# Patient Record
Sex: MALE | Race: WHITE | NOT HISPANIC OR LATINO | Employment: OTHER | URBAN - METROPOLITAN AREA
[De-identification: names, ages, dates, MRNs, and addresses within clinical notes are randomized per-mention and may not be internally consistent; named-entity substitution may affect disease eponyms.]

---

## 2017-01-13 ENCOUNTER — ANTICOAGULATION THERAPY VISIT (OUTPATIENT)
Dept: NURSING | Facility: CLINIC | Age: 77
End: 2017-01-13
Payer: MEDICARE

## 2017-01-13 LAB — INR POINT OF CARE: 3.4 (ref 2.5–3.5)

## 2017-01-13 PROCEDURE — 99207 ZZC NO CHARGE NURSE ONLY: CPT

## 2017-01-13 PROCEDURE — 85610 PROTHROMBIN TIME: CPT | Mod: QW

## 2017-01-13 PROCEDURE — 36416 COLLJ CAPILLARY BLOOD SPEC: CPT

## 2017-01-13 NOTE — PROGRESS NOTES
ANTICOAGULATION FOLLOW-UP CLINIC VISIT    Patient Name:  Fausto Farr  Date:  1/13/2017  Contact Type:  Face to Face    SUBJECTIVE:     Patient Findings     Positives Other Complaints (Feeks weak,b/p 102/70,pulse 64)    Comments Son is in a coma            OBJECTIVE    INR PROTIME   Date Value Ref Range Status   01/13/2017 3.4 2.5 - 3.5 Final       ASSESSMENT / PLAN  INR assessment THER    Recheck INR In: 4 WEEKS    INR Location Clinic      Anticoagulation Summary as of 1/13/2017     INR goal 2.5-3.5   Selected INR 3.4 (1/13/2017)   Maintenance plan 5 mg (5 mg x 1) on Mon, Fri; 7.5 mg (5 mg x 1.5) all other days   Full instructions 5 mg on Mon, Fri; 7.5 mg all other days   Weekly total 47.5 mg   No change documented Gayatri Parmar RN   Plan last modified Gayatri Parmar RN (12/23/2016)   Next INR check 2/10/2017   Priority INR   Target end date Indefinite    Indications   AF (atrial fibrillation) (H) [I48.91]  S/P mitral valve replacement [Z95.2]  Long-term (current) use of anticoagulants [Z79.01] [Z79.01]         Anticoagulation Episode Summary     [facility]R check location [facility]    Preferred lab     Send INR reminders to [facility] INR/PROTIME    Comments       Anticoagulation Care Providers     Provider Role Specialty Phone number    Nii Nelson MD [facility] [phone]            See the Encounter Report to view Anticoagulation Flowsheet and Dosing Calendar (Go to Encounters tab in chart review, and find the Anticoagulation Therapy Visit)        Gayatri Parmar RN

## 2017-01-13 NOTE — MR AVS SNAPSHOT
Fausto Carson Yordan   1/13/2017 9:45 AM   Anticoagulation Therapy Visit    Description:  76 year old male   Provider:  [facility]   Department:  Bx Nurse           INR as of 1/13/2017     Selected INR 3.4 (1/13/2017)      Anticoagulation Summary as of 1/13/2017     INR goal 2.5-3.5   Selected INR 3.4 (1/13/2017)   Full instructions 5 mg on Mon, Fri; 7.5 mg all other days   Next INR check 2/10/2017    Indications   AF (atrial fibrillation) (H) [I48.91]  S/P mitral valve replacement [Z95.2]  Long-term (current) use of anticoagulants [Z79.01] [Z79.01]         Your next Anticoagulation Clinic appointment(s)     Feb 10, 2017  9:45 AM   Anticoagulation Visit with  ANTICOAGULATION CLINIC   [facility] ([facility])    [address] [place] [id number] [phone]              Contact Numbers     [facility]  Please call  [phone] to cancel and/or reschedule your appointment   The direct line to the anticoagulant nurse is [phone] on Monday, Wednesday, and Friday. On Thursday, the anticoagulant nurse can be reached directly at [phone].         January 2017 Details    Sun Mon Tue Wed Thu Fri Sat     1               2               3               4               5               6               7                 8               9               10               11               12               13      5 mg   See details      14      7.5 mg           15      7.5 mg         16      5 mg         17      7.5 mg         18      7.5 mg         19      7.5 mg         20      5 mg         21      7.5 mg           22      7.5 mg         23      5 mg         24      7.5 mg         25      7.5 mg         26      7.5 mg         27      5 mg         28      7.5 mg           29      7.5 mg         30      5 mg         31      7.5 mg              Date Details   01/13 This INR check               How to take your  warfarin dose     To take:  5 mg Take 1 of the 5 mg tablets.    To take:  7.5 mg Take 1.5 of the 5 mg tablets.           February 2017 Details    Sun Mon Tue Wed Thu Fri Sat        1      7.5 mg         2      7.5 mg         3      5 mg         4      7.5 mg           5      7.5 mg         6      5 mg         7      7.5 mg         8      7.5 mg         9      7.5 mg         10            11                 12               13               14               15               16               17               18                 19               20               21               22               23               24               25                 26               27               28                    Date Details   No additional details    Date of next INR:  2/10/2017         How to take your warfarin dose     To take:  5 mg Take 1 of the 5 mg tablets.    To take:  7.5 mg Take 1.5 of the 5 mg tablets.

## 2017-01-17 DIAGNOSIS — E78.2 MIXED HYPERLIPIDEMIA: Primary | ICD-10-CM

## 2017-01-17 NOTE — TELEPHONE ENCOUNTER
ZETIA 10MG      Last Written Prescription Date: 7/20/16  Last Fill Quantity: 90, # refills: 1    [place] [facility] [place] [facility] prescribing provider:  9/6/16   Future Office Visit:      CHOLESTEROL   Date Value Ref Range Status   07/18/2016 186 <200 mg/dL Final     HDL CHOLESTEROL   Date Value Ref Range Status   07/18/2016 33* >39 mg/dL Final     LDL CHOLESTEROL CALCULATED   Date Value Ref Range Status   07/18/2016 103* <100 mg/dL Final     Comment:     Above desirable:  100-129 mg/dl   Borderline High:  130-159 mg/dL   High:             160-189 mg/dL   Very high:       >189 mg/dl       LDL CHOLESTEROL DIRECT   Date Value Ref Range Status   03/21/2016 113* <100 mg/dL Final     Comment:     Above desirable:  100-129 mg/dl   Borderline High:  130-159 mg/dL   High:             160-189 mg/dL   Very high:       >189 mg/dl       TRIGLYCERIDES   Date Value Ref Range Status   07/18/2016 248* <150 mg/dL Final     Comment:     Fasting specimen   Borderline high:  150-199 mg/dl   High:             200-499 mg/dl   Very high:       >499 mg/dl       CHOLESTEROL/HDL RATIO   Date Value Ref Range Status   06/03/2015 3.6 0.0 - 5.0 Final     ALT   Date Value Ref Range Status   07/18/2016 35 0 - 70 U/L Final

## 2017-01-18 RX ORDER — EZETIMIBE 10 MG
TABLET ORAL
Qty: 90 TABLET | Refills: 2 | Status: SHIPPED | OUTPATIENT
Start: 2017-01-18 | End: 2017-10-29

## 2017-01-18 NOTE — TELEPHONE ENCOUNTER
Prescription approved per [facility] Refill Protocol.  Coty Hair RN- Triage [employer]

## 2017-01-19 ENCOUNTER — TRANSFERRED RECORDS (OUTPATIENT)
Dept: HEALTH INFORMATION MANAGEMENT | Facility: CLINIC | Age: 77
End: 2017-01-19

## 2017-02-10 ENCOUNTER — ANTICOAGULATION THERAPY VISIT (OUTPATIENT)
Dept: NURSING | Facility: CLINIC | Age: 77
End: 2017-02-10
Payer: MEDICARE

## 2017-02-10 DIAGNOSIS — I48.91 AF (ATRIAL FIBRILLATION) (H): ICD-10-CM

## 2017-02-10 DIAGNOSIS — Z79.01 LONG-TERM (CURRENT) USE OF ANTICOAGULANTS: Primary | ICD-10-CM

## 2017-02-10 DIAGNOSIS — Z95.2 S/P MITRAL VALVE REPLACEMENT: ICD-10-CM

## 2017-02-10 LAB — INR POINT OF CARE: 2.5 (ref 0.86–1.14)

## 2017-02-10 PROCEDURE — 99207 ZZC NO CHARGE NURSE ONLY: CPT

## 2017-02-10 PROCEDURE — 36416 COLLJ CAPILLARY BLOOD SPEC: CPT

## 2017-02-10 PROCEDURE — 85610 PROTHROMBIN TIME: CPT | Mod: QW

## 2017-02-10 NOTE — PROGRESS NOTES
ANTICOAGULATION FOLLOW-UP CLINIC VISIT    Patient Name:  Fausto Farr  Date:  2/10/2017  Contact Type:  Face to Face    SUBJECTIVE:     Patient Findings     Positives No Problem Findings           OBJECTIVE    INR PROTIME   Date Value Ref Range Status   02/10/2017 2.5* 0.86 - 1.14 Final       ASSESSMENT / PLAN  INR assessment THER    Recheck INR In: 4 WEEKS    INR Location Clinic      Anticoagulation Summary as of 2/10/2017     INR goal 2.5-3.5   Selected INR 2.5 (2/10/2017)   Maintenance plan 5 mg (5 mg x 1) on Mon, Fri; 7.5 mg (5 mg x 1.5) all other days   Full instructions 5 mg on Mon, Fri; 7.5 mg all other days   Weekly total 47.5 mg   Plan last modified Gayatri Parmar RN (12/23/2016)   Next INR check 3/10/2017   Priority INR   Target end date Indefinite    Indications   AF (atrial fibrillation) (H) [I48.91]  S/P mitral valve replacement [Z95.2]  Long-term (current) use of anticoagulants [Z79.01] [Z79.01]         Anticoagulation Episode Summary     INR check location [facility]    Preferred lab     Send INR reminders to [facility] INR/PROTIME    Comments       Anticoagulation Care Providers     Provider Role Specialty Phone number    Nii Nelson MD [facility] [phone]            See the Encounter Report to view Anticoagulation Flowsheet and Dosing Calendar (Go to Encounters tab in chart review, and find the Anticoagulation Therapy Visit)        Latanya Ram RN

## 2017-02-10 NOTE — MR AVS SNAPSHOT
Fausto Danielsdashawn Farr   2/10/2017 9:45 AM   Anticoagulation Therapy Visit    Description:  76 year old male   Provider:  [facility]   Department:  Bx Nurse           INR as of 2/10/2017     Selected INR 2.5 (2/10/2017)      Anticoagulation Summary as of 2/10/2017     INR goal 2.5-3.5   Selected INR 2.5 (2/10/2017)   Full instructions 5 mg on Mon, Fri; 7.5 mg all other days   Next INR check 3/10/2017    Indications   AF (atrial fibrillation) (H) [I48.91]  S/P mitral valve replacement [Z95.2]  Long-term (current) use of anticoagulants [Z79.01] [Z79.01]         Your next Anticoagulation Clinic appointment(s)     Mar 10, 2017  9:45 AM   Anticoagulation Visit with  ANTICOAGULATION CLINIC   [facility] ([facility])    [address] [place] [id number] [phone]              Contact Numbers     [facility]  Please call  [phone] to cancel and/or reschedule your appointment   The direct line to the anticoagulant nurse is [phone] on Monday, Wednesday, and Friday. On Thursday, the anticoagulant nurse can be reached directly at [phone].         February 2017 Details    Sun Mon Tue Wed Thu Fri Sat        1               2               3               4                 5               6               7               8               9               10      5 mg   See details      11      7.5 mg           12      7.5 mg         13      5 mg         14      7.5 mg         15      7.5 mg         16      7.5 mg         17      5 mg         18      7.5 mg           19      7.5 mg         20      5 mg         21      7.5 mg         22      7.5 mg         23      7.5 mg         24      5 mg         25      7.5 mg           26      7.5 mg         27      5 mg         28      7.5 mg              Date Details   02/10 This INR check               How to take your warfarin dose     To take:  5 mg Take 1 of the  5 mg tablets.    To take:  7.5 mg Take 1.5 of the 5 mg tablets.           March 2017 Details    Sun Mon Tue Wed Thu Fri Sat        1      7.5 mg         2      7.5 mg         3      5 mg         4      7.5 mg           5      7.5 mg         6      5 mg         7      7.5 mg         8      7.5 mg         9      7.5 mg         10            11                 12               13               14               15               16               17               18                 19               20               21               22               23               24               25                 26               27               28               29               30               31                 Date Details   No additional details    Date of next INR:  3/10/2017         How to take your warfarin dose     To take:  5 mg Take 1 of the 5 mg tablets.    To take:  7.5 mg Take 1.5 of the 5 mg tablets.

## 2017-02-21 DIAGNOSIS — Z79.01 LONG-TERM (CURRENT) USE OF ANTICOAGULANTS: ICD-10-CM

## 2017-02-21 DIAGNOSIS — Z95.2 S/P AORTIC VALVE REPLACEMENT: ICD-10-CM

## 2017-02-21 RX ORDER — WARFARIN SODIUM 5 MG/1
TABLET ORAL
Qty: 135 TABLET | Refills: 0 | Status: SHIPPED | OUTPATIENT
Start: 2017-02-21 | End: 2017-07-13

## 2017-02-21 NOTE — TELEPHONE ENCOUNTER
Warfarin    Last Written Prescription Date: 5/5/2016  Last Fill Qty: 135, # refills: 2  Last Office Visit with [facility] or [facility] prescribing provider: 9/6/2016       Date and Result of Last PT/INR:   Lab Results   Component Value Date    INR 2.5 02/10/2017    INR 3.4 01/13/2017    INR 2.36 11/07/2015    INR 2.05 11/06/2015    PT 11.6 09/25/2012    PT 24.8 09/17/2012        Prescription approved per [facility] or [facility] refill protocol.  Cecy Crenshaw RN  Triage [employer]

## 2017-03-10 ENCOUNTER — TELEPHONE (OUTPATIENT)
Dept: NURSING | Facility: CLINIC | Age: 77
End: 2017-03-10

## 2017-03-10 ENCOUNTER — ANTICOAGULATION THERAPY VISIT (OUTPATIENT)
Dept: NURSING | Facility: CLINIC | Age: 77
End: 2017-03-10
Payer: MEDICARE

## 2017-03-10 DIAGNOSIS — I48.91 AF (ATRIAL FIBRILLATION) (H): ICD-10-CM

## 2017-03-10 DIAGNOSIS — Z79.01 LONG-TERM (CURRENT) USE OF ANTICOAGULANTS: ICD-10-CM

## 2017-03-10 DIAGNOSIS — Z95.2 S/P MITRAL VALVE REPLACEMENT: ICD-10-CM

## 2017-03-10 LAB — INR POINT OF CARE: 2.6 (ref 2.5–3.5)

## 2017-03-10 PROCEDURE — 85610 PROTHROMBIN TIME: CPT | Mod: QW

## 2017-03-10 PROCEDURE — 36416 COLLJ CAPILLARY BLOOD SPEC: CPT

## 2017-03-10 PROCEDURE — 99207 ZZC NO CHARGE NURSE ONLY: CPT

## 2017-03-10 NOTE — MR AVS SNAPSHOT
Fausto Carson Yordan   3/10/2017 9:45 AM   Anticoagulation Therapy Visit    Description:  76 year old male   Provider:  [facility]   Department:  Bx Nurse           INR as of 3/10/2017     Today's INR 2.6      Anticoagulation Summary as of 3/10/2017     INR goal 2.5-3.5   Today's INR 2.6   Full instructions 5 mg on Mon; 7.5 mg all other days   Next INR check 4/14/2017    Indications   AF (atrial fibrillation) (H) [I48.91]  S/P mitral valve replacement [Z95.2]  Long-term (current) use of anticoagulants [Z79.01] [Z79.01]         Your next Anticoagulation Clinic appointment(s)     Apr 14, 2017  9:15 AM CDT   Anticoagulation Visit with  ANTICOAGULATION CLINIC   [facility] ([facility])    [address] [place] [address] [id number] [phone]              Contact Numbers     [facility]  Please call  [phone] to cancel and/or reschedule your appointment   The direct line to the anticoagulant nurse is [phone] on Monday, Wednesday, and Friday. On Thursday, the anticoagulant nurse can be reached directly at [phone].         March 2017 Details    Sun Mon Tue Wed Thu Fri Sat        1               2               3               4                 5               6               7               8               9               10      7.5 mg   See details      11      7.5 mg           12      7.5 mg         13      5 mg         14      7.5 mg         15      7.5 mg         16      7.5 mg         17      7.5 mg         18      7.5 mg           19      7.5 mg         20      5 mg         21      7.5 mg         22      7.5 mg         23      7.5 mg         24      7.5 mg         25      7.5 mg           26      7.5 mg         27      5 mg         28      7.5 mg         29      7.5 mg         30      7.5 mg         31      7.5 mg           Date Details   03/10 This INR check               How to take your  warfarin dose     To take:  5 mg Take 1 of the 5 mg tablets.    To take:  7.5 mg Take 1.5 of the 5 mg tablets.           April 2017 Details    Sun Mon Tue Wed Thu Fri Sat           1      7.5 mg           2      7.5 mg         3      5 mg         4      7.5 mg         5      7.5 mg         6      7.5 mg         7      7.5 mg         8      7.5 mg           9      7.5 mg         10      5 mg         11      7.5 mg         12      7.5 mg         13      7.5 mg         14            15                 16               17               18               19               20               21               22                 23               24               25               26               27               28               29                 30                      Date Details   No additional details    Date of next INR:  4/14/2017         How to take your warfarin dose     To take:  5 mg Take 1 of the 5 mg tablets.    To take:  7.5 mg Take 1.5 of the 5 mg tablets.

## 2017-03-10 NOTE — TELEPHONE ENCOUNTER
Patient was in for his INR and requested to have his b/p checked it was 118/68.Pt states that he was taking 1.5 tabs of metoprolol 2x/day but 2 weeks ago he was feeling very tired and his b/p was very low (didn't give a reading) so he dropped the half and went back to 1 tab 2x day and that is where he is going to stay wants the Dr to know and to change it in his chart. Will forward to provider.

## 2017-03-10 NOTE — PROGRESS NOTES
ANTICOAGULATION FOLLOW-UP CLINIC VISIT    Patient Name:  Fausto Farr  Date:  3/10/2017  Contact Type:  Face to Face    SUBJECTIVE:     Patient Findings     Positives No Problem Findings    Comments B/P 118/68            OBJECTIVE    INR Protime   Date Value Ref Range Status   03/10/2017 2.6 2.5 - 3.5 Final       ASSESSMENT / PLAN  INR assessment THER    Recheck INR In: 5 WEEKS    INR Location Clinic      Anticoagulation Summary as of 3/10/2017     INR goal 2.5-3.5   Today's INR 2.6   Maintenance plan 5 mg (5 mg x 1) on Mon; 7.5 mg (5 mg x 1.5) all other days   Full instructions 5 mg on Mon; 7.5 mg all other days   Weekly total 50 mg   Plan last modified Gayatri Parmar RN (3/10/2017)   Next INR check 4/14/2017   Priority INR   Target end date Indefinite    Indications   AF (atrial fibrillation) (H) [I48.91]  S/P mitral valve replacement [Z95.2]  Long-term (current) use of anticoagulants [Z79.01] [Z79.01]         Anticoagulation Episode Summary     INR check location [facility]    Preferred lab     Send INR reminders to [facility] INR/PROTIME    Comments       Anticoagulation Care Providers     Provider Role Specialty Phone number    Nii Nelson MD [facility] [phone]            See the Encounter Report to view Anticoagulation Flowsheet and Dosing Calendar (Go to Encounters tab in chart review, and find the Anticoagulation Therapy Visit)        Gayatri Parmar RN

## 2017-03-10 NOTE — TELEPHONE ENCOUNTER
Yes BP staying well controlled so he should stay at the 1 tab twice daily.  Will change Rx when we need to refill the next time

## 2017-04-14 ENCOUNTER — ANTICOAGULATION THERAPY VISIT (OUTPATIENT)
Dept: NURSING | Facility: CLINIC | Age: 77
End: 2017-04-14
Payer: MEDICARE

## 2017-04-14 ENCOUNTER — OFFICE VISIT (OUTPATIENT)
Dept: FAMILY MEDICINE | Facility: CLINIC | Age: 77
End: 2017-04-14
Payer: MEDICARE

## 2017-04-14 VITALS
SYSTOLIC BLOOD PRESSURE: 112 MMHG | WEIGHT: 225 LBS | TEMPERATURE: 97.9 F | DIASTOLIC BLOOD PRESSURE: 60 MMHG | HEIGHT: 65 IN | OXYGEN SATURATION: 96 % | BODY MASS INDEX: 37.49 KG/M2 | RESPIRATION RATE: 18 BRPM | HEART RATE: 80 BPM

## 2017-04-14 DIAGNOSIS — R22.9 LOCALIZED SUPERFICIAL SWELLING, MASS, OR LUMP: Primary | ICD-10-CM

## 2017-04-14 DIAGNOSIS — Z95.2 S/P MITRAL VALVE REPLACEMENT: ICD-10-CM

## 2017-04-14 DIAGNOSIS — Z79.01 LONG-TERM (CURRENT) USE OF ANTICOAGULANTS: ICD-10-CM

## 2017-04-14 DIAGNOSIS — I48.91 AF (ATRIAL FIBRILLATION) (H): ICD-10-CM

## 2017-04-14 LAB — INR POINT OF CARE: 5 (ref 0.86–1.14)

## 2017-04-14 PROCEDURE — 85610 PROTHROMBIN TIME: CPT | Mod: QW

## 2017-04-14 PROCEDURE — 99213 OFFICE O/P EST LOW 20 MIN: CPT | Performed by: PHYSICIAN ASSISTANT

## 2017-04-14 PROCEDURE — 99207 ZZC NO CHARGE NURSE ONLY: CPT

## 2017-04-14 PROCEDURE — 36416 COLLJ CAPILLARY BLOOD SPEC: CPT

## 2017-04-14 NOTE — MR AVS SNAPSHOT
"              After Visit Summary   4/14/2017    Fausto Farr    MRN: [id number]           Patient Information     Date Of Birth          1940        Visit Information        Provider Department      4/14/2017 9:30 AM Siegler, Nicole Joy, PA-C [facility]        Today's Diagnoses     Localized superficial swelling, mass, or lump    -  1       Follow-ups after your visit        Your next 10 appointments already scheduled     Apr 19, 2017  9:30 AM CDT   Anticoagulation Visit with [facility]   [facility] ([facility]    [address]  [facility] [id number] [phone]            Apr 25, 2017 11:40 AM CDT   PHYSICAL with Tu Reyes MD   [facility] ([facility]    [address]  [facility] [phone]              Who to contact     If you have questions or need follow up information about today's clinic visit or your schedule please contact [facility] directly at [phone].  Normal or non-critical lab and imaging results will be communicated to you by MyChart, letter or phone within 4 business days after the clinic has received the results. If you do not hear from us within 7 days, please contact the clinic through MyChart or phone. If you have a critical or abnormal lab result, we will notify you by phone as soon as possible.  Submit refill requests through [employer] or call your pharmacy and they will forward the refill request to us. Please allow 3 business days for your refill to be completed.          Additional Information About Your Visit        MyChart Information     [employer] lets you send messages to your doctor, view your test results, renew your prescriptions, schedule appointments and more. To sign up, go to [email or web address] . Click on \"Log in\" on the left " "side of the screen, which will take you to the Welcome page. Then click on \"Sign up Now\" on the right side of the page.     You will be asked to enter the access code listed below, as well as some personal information. Please follow the directions to create your username and password.     Your access code is: [email or web address]  Expires: 2017 10:08 AM     Your access code will  in 90 days. If you need help or a new code, please call your [facility] clinic or [phone].        Care EveryWhere ID     This is your Care EveryWhere ID. This could be used by other organizations to access your [facility] medical records  [id number]        Your Vitals Were     Pulse Temperature Respirations Height Pulse Oximetry BMI (Body Mass Index)    80 97.9  F (36.6  C) (Tympanic) 18 5' 5\" (1.651 m) 96% 37.44 kg/m2       Blood Pressure from Last 3 Encounters:   17 112/60   16 130/80   16 117/71    Weight from Last 3 Encounters:   17 225 lb (102.1 kg)   16 219 lb (99.3 kg)   16 217 lb (98.4 kg)              Today, you had the following     No orders found for display       Primary Care Provider Office Phone # Fax #    Nii Nelson [phone]       [facility] [address] [facility] [address]        Thank you!     Thank you for choosing [facility]  for your care. Our goal is always to provide you with excellent care. Hearing back from our patients is one way we can continue to improve our services. Please take a few minutes to complete the written survey that you may receive in the mail after your visit with us. Thank you!             Your Updated Medication List - Protect others around you: Learn how to safely use, store and throw away your medicines at www.disposemymeds.org.          This list is accurate as of: 17 10:08 AM.  Always use your most recent med list.                   Brand Name Dispense Instructions for use "    ASPIRIN EC PO      Take 81 mg by mouth every evening       betamethasone valerate 0.1 % lotion    VALISONE    60 mL    APPLY TOPICALLY TWICE DAILY PRN       calcium carb 1250 mg (500 mg [place])/vitamin D 200 units 500-200 MG-UNIT per tablet    OSCAL with D    90 tablet    Take 1 tablet by mouth 3 times daily (with meals)       CRESTOR 40 MG tablet   Generic drug:  rosuvastatin     90 tablet    TAKE 1 TABLET BY MOUTH DAILY.       erythromycin ophthalmic ointment    ROMYCIN     AURELIA A SMALL AMOUNT IN BOTH EYES ONCE IN THE EVENING FOR 30 DAYS       fluocinonide 0.05 % ointment    LIDEX    60 g    2- 30 gram tubes.  Apply twice a day as needed.       furosemide 40 MG tablet    LASIX    45 tablet    Take 0.5 tablets (20 mg) by mouth daily       lisinopril 2.5 MG tablet    PRINIVIL/Zestril    90 tablet    Take 1 tablet (2.5 mg) by mouth daily       mesalamine 800 MG EC tablet    ASACOL HD    180 tablet    TAKE 1 TABLET BY MOUTH TWICE DAILY       metoprolol 25 MG tablet    LOPRESSOR    270 tablet    Take 1.5 tablets (37.5 mg) by mouth 2 times daily       OMEGA-3 FISH OIL PO      Take 2 g by mouth 2 times daily (with meals)       tamsulosin 0.4 MG capsule    FLOMAX    90 capsule    TAKE 1 CAPSULE BY MOUTH EVERY DAY       warfarin 5 MG tablet    COUMADIN    135 tablet    TAKE 1 AND 1/2 TABLETS BY MOUTH DAILY OR AS DIRECTED       ZETIA 10 MG tablet   Generic drug:  ezetimibe     90 tablet    TAKE 1 TABLET BY MOUTH DAILY

## 2017-04-14 NOTE — PROGRESS NOTES
SUBJECTIVE:                                                    Fausto Farr is a 76 year old male who presents to clinic today for the following health issues:      Bump on arm      Duration: 5 days    Description (location/character/radiation): Non painful bump on right forearm. Started with redness which turned to bruising around area    Intensity:  mild    Accompanying signs and symptoms: No pain, No redness, No known injury    History (similar episodes/previous evaluation): None    Precipitating or alleviating factors: None    Therapies tried and outcome: None     Pt visits today for check of a lump on the right volar forearm that he noticed about 5 days ago. He has been doing a lot of yard work and manipulations with this hands, wrist and forearms. He has no pain in the area but noticed a subsiding area of bruising around it. No swelling, redness, numbness or tingling of the arm, hand or fingers.     He is currently on warfarin, his INR today was 5.0. He will follow instructions of INR nurse to lower this. He is scheduled for recheck in 5 days.     Problem list and histories reviewed & adjusted, as indicated.  Additional history: as documented    Patient Active Problem List   Diagnosis     Rotator cuff (capsule) sprain     Somatic dysfunction of pelvic region     Contact dermatitis and other eczema, due to unspecified cause     Ulcerative colitis (H)     Atrial fibrillation (H)     Other specified anemias     Gastric ulcer     Bilateral low back pain with left-sided sciatica     Nonallopathic lesion of lumbar region     Nonallopathic lesion of sacral region     Diaphragmatic hernia     Abdominal pain, epigastric     Malaise and fatigue     Nocturia     Nonallopathic lesion of cervical region     Nonallopathic lesion of thoracic region     Cellulitis and abscess     Malignant neoplasm of prostate (H)     Abdominal aortic aneurysm (H)     Nonallopathic lesion of rib cage     Vitamin D deficiency disease      Anemia relative at 12.5 in 8-13      Essential hypertension, benign     Hyperlipidemia LDL goal <100     Prostate cancer (H)     Glucose intolerance (impaired glucose tolerance)     Back pain-since 1980's     Sciatica of left side since 2000     Valvular heart disease     Heart murmur     MRSA (methicillin resistant Staphylococcus aureus)     Health Care Home     Urinary retention     Coronary artery disease     ACP (advance care planning)     AF (atrial fibrillation) (H)     S/P aortic valve replacement     S/P CABG (coronary artery bypass graft)     Stented coronary artery     Mixed hyperlipidemia     PVD (peripheral vascular disease) (H)     Abnormal ECG     Personal history of tobacco use, presenting hazards to health     Spinal stenosis of lumbar region without neurogenic claudication     S/P mitral valve replacement     RBBB with left anterior fascicular block     S/P lumbar spinal fusion     Long-term (current) use of anticoagulants [Z79.01]     Chronic systolic congestive heart failure (H)     Paroxysmal atrial fibrillation (H)     Past Surgical History:   Procedure Laterality Date     AORTIC VALVE REPLACEMENT  1/3/06    redo AVR SJM 21mm and SJM MVR 27mm in 2013SJM 21([id number]):AVR, SJM 27 :MVR-     ARTHROPLASTY KNEE      right knee     BACK SURGERY  Oct 2015    Fusion L4-5, laminectomy L2, L3     BYPASS GRAFT ARTERY CORONARY  10/2013    reimplantation of radial artery graft to RCA     C CABG, VEIN, TWO  1/3/06    Left radial to RCA, LIMA to LAD (RA to RCA reimplanted at time of 2013 surg)     CARDIAC CATHERIZATION  11/2005    Stent placed to RCA     CARDIAC CATHERIZATION  04/2013    Occluded RCA, patent LIMA to LAD and radial graft to PDA     CARPAL TUNNEL RELEASE RT/LT  1994     COLONOSCOPY  8-22-11     CYSTOSCOPY FLEXIBLE  10/16/2013    Procedure: CYSTOSCOPY FLEXIBLE;  FLEXIBLE CYSTOSCOPY / DILATION OF URETHRA / INSERTION OF LESLIE;  Surgeon: Cooper Wallace MD;  Location:  OR      ENDOVASCULAR REPAIR ANEURYSM ABDOMINAL AORTA  2006     ENDOVASCULAR REPAIR, INFRARENAL ABDOMINAL AORTIC ANEURYSM/DISSECTION; MODULAR BIFURCATED PROSTHESIS      AAA repair endovascular     ENT SURGERY       GENITOURINARY SURGERY  6/16/08    radioactive seeding     HEAD & NECK SURGERY  1997    vocal cord polypectomy     KNEE SURGERY  2001 Right knee arthroscopy     OPTICAL TRACKING SYSTEM FUSION SPINE POSTERIOR LUMBAR THREE+ LEVELS N/A 10/29/2015    Procedure: OPTICAL TRACKING SYSTEM FUSION SPINE POSTERIOR LUMBAR THREE+ LEVELS;  Surgeon: Walt Garcia MD;  Location: [facility] OR     PROSTATE SURGERY  06/16/2008 Radioactive seeding     PROSTATE SURGERY      radioactive seeding 6/16/08     REPAIR ANEURYSM ABDOMINAL AORTA  06/08     REPAIR VALVE MITRAL  10/16/2013    SJM 21([id number]):AVR, SJM 27 :MVRProcedure: REPAIR VALVE MITRAL;  REDO STERNOTOMY/REDO AORTIC VALVE REPLACEMENT/ MITRAL VALVE REPLACEMENT/REIMPLANTATION OF RIGHT CORONARY ARTERY BYPASS WITH RACHAEL ( ON PUMP);  Surgeon: Veit Singh MD;  Location: [facility] OR     REPLACE VALVE AORTIC  10/16/2013    Procedure: REPLACE VALVE AORTIC;;  Surgeon: Viet Singh MD;  Location: [facility] OR     SURGERY GENERAL IP CONSULT  05/2008 Excision aneurysm abdominal aorta     SURGERY GENERAL IP CONSULT  1997 Vocal cord polypectomy     VASCULAR SURGERY  1968, 1993     varicose vein stripping       Social History   Substance Use Topics     Smoking status: Former Smoker     Packs/day: 1.00     Years: 40.00     Quit date: 10/23/2002     Smokeless tobacco: Never Used     Alcohol use Yes      Comment: a couple beers per week     Family History   Problem Relation Age of Onset     Coronary Artery Disease Father      CABG     HEART DISEASE Father      Pacemaker     HEART DISEASE Brother          Current Outpatient Prescriptions   Medication Sig Dispense Refill     warfarin (COUMADIN) 5 MG tablet TAKE 1 AND 1/2 TABLETS BY MOUTH DAILY OR AS DIRECTED 135 tablet 0     mesalamine  "(ASACOL HD) 800 MG EC tablet TAKE 1 TABLET BY MOUTH TWICE DAILY 180 tablet 1     ZETIA 10 MG tablet TAKE 1 TABLET BY MOUTH DAILY 90 tablet 2     furosemide (LASIX) 40 MG tablet Take 0.5 tablets (20 mg) by mouth daily 45 tablet 3     tamsulosin (FLOMAX) 0.4 MG 24 hr capsule TAKE 1 CAPSULE BY MOUTH EVERY DAY 90 capsule 3     lisinopril (PRINIVIL,ZESTRIL) 2.5 MG tablet Take 1 tablet (2.5 mg) by mouth daily 90 tablet 3     fluocinonide (LIDEX) 0.05 % ointment 2- 30 gram tubes.  Apply twice a day as needed. 60 g 2     betamethasone valerate (VALISONE) 0.1 % lotion APPLY TOPICALLY TWICE DAILY PRN 60 mL 3     erythromycin (ROMYCIN) ophthalmic ointment AURELIA A SMALL AMOUNT IN BOTH EYES ONCE IN THE EVENING FOR 30 DAYS  2     CRESTOR 40 MG tablet TAKE 1 TABLET BY MOUTH DAILY. 90 tablet 3     metoprolol (LOPRESSOR) 25 MG tablet Take 1.5 tablets (37.5 mg) by mouth 2 times daily 270 tablet 3     calcium carb 1250 mg, 500 mg Cachil DeHe,/vitamin D 200 units (OSCAL WITH D) 500-200 MG-UNIT per tablet Take 1 tablet by mouth 3 times daily (with meals) 90 tablet 3     ASPIRIN EC PO Take 81 mg by mouth every evening       Omega-3 Fatty Acids (OMEGA-3 FISH OIL PO) Take 2 g by mouth 2 times daily (with meals)          ROS:  Patient denies fever, chills, nausea, vomiting, diarrhea, abdominal pain, chest pain, shortness of breath, headache, dizziness, lightheadedness.    OBJECTIVE:                                                    /60 (BP Location: Right arm, Patient Position: Right side, Cuff Size: Adult Large)  Pulse 80  Temp 97.9  F (36.6  C) (Tympanic)  Resp 18  Ht 5' 5\" (1.651 m)  Wt 225 lb (102.1 kg)  SpO2 96%  BMI 37.44 kg/m2  Body mass index is 37.44 kg/(m^2).  GENERAL: healthy, alert and no distress  RESP: non labored breathing  MS: RUE exam shows normal strength and muscle mass, no deformities, no evidence of joint effusion, ROM of all joints is normal and no evidence of joint instability. No swelling compared to the left " upper extremity.   SKIN: medial volar aspect of forearm with small approx 5mm subcutaneous nodule that is not discolored, not tender to touch, not mobile. Surrounding tissue with healing ecchymosis and no erythema, induration or tenderness  NEURO: SILT r/u/m nerve distributions RUE  VASC: digits warm and well perfused, cap refill <2 secs in RUE    Diagnostic Test Results:  none      ASSESSMENT/PLAN:                                                        ICD-10-CM    1. Localized superficial swelling, mass, or lump R22.9    2. Long-term (current) use of anticoagulants [Z79.01] Z79.01      Possible lipoma that is calcified now and more notable to the patient? Uncommon placement for synovial cyst though it does appear to follow the flexor musculature. No concern at this time for blood clot or dangerous etiology.     He will continue to follow INR nurse recommendations and return next week.     He will use ace bandage, ice or heat the area and monitor symptoms. Return if swelling, redness, pain or numbness/tingling ensue. He agrees with the plan.     Nicole Joy Siegler, PA-C  [facility]

## 2017-04-14 NOTE — PROGRESS NOTES
ANTICOAGULATION FOLLOW-UP CLINIC VISIT    Patient Name:  Fausto Farr  Date:  4/14/2017  Contact Type:  Face to Face    SUBJECTIVE:     Patient Findings     Positives Other complaints (hip and low back pain)           OBJECTIVE    INR Protime   Date Value Ref Range Status   04/14/2017 5.0 (A) 0.86 - 1.14 Final       ASSESSMENT / PLAN  INR assessment SUPRA    Recheck INR In: 5 DAYS    INR Location Clinic      Anticoagulation Summary as of 4/14/2017     INR goal 2.5-3.5   Today's INR 5.0!   Maintenance plan 5 mg (5 mg x 1) on Mon; 7.5 mg (5 mg x 1.5) all other days   Full instructions 4/14: Hold; 4/15: Hold; Otherwise 5 mg on Mon; 7.5 mg all other days   Weekly total 50 mg   Plan last modified Gayatri Parmar RN (3/10/2017)   Next INR check 4/19/2017   Priority INR   Target end date Indefinite    Indications   AF (atrial fibrillation) (H) [I48.91]  S/P mitral valve replacement [Z95.2]  Long-term (current) use of anticoagulants [Z79.01] [Z79.01]         Anticoagulation Episode Summary     [facility]R check [facility]    Preferred lab     Send INR reminders to [facility] INR/PROTIME    Comments       Anticoagulation Care Providers     Provider Role Specialty Phone number    Nii Nelson MD [facility] [phone]            See the Encounter Report to view Anticoagulation Flowsheet and Dosing Calendar (Go to Encounters tab in chart review, and find the Anticoagulation Therapy Visit)        Latanya Ram RN

## 2017-04-14 NOTE — NURSING NOTE
"Chief Complaint   Patient presents with     Derm Problem       Initial /60 (BP Location: Right arm, Patient Position: Right side, Cuff Size: Adult Large)  Pulse 80  Temp 97.9  F (36.6  C) (Tympanic)  Resp 18  Ht 5' 5\" (1.651 m)  Wt 225 lb (102.1 kg)  SpO2 96%  BMI 37.44 kg/m2 Estimated body mass index is 37.44 kg/(m^2) as calculated from the following:    Height as of this encounter: 5' 5\" (1.651 m).    Weight as of this encounter: 225 lb (102.1 kg).  Medication Reconciliation: complete   Emiliana Sampson CMA (AAMA)      "

## 2017-04-14 NOTE — MR AVS SNAPSHOT
Fausto Carson Yordan   4/14/2017 9:15 AM   Anticoagulation Therapy Visit    Description:  76 year old male   Provider:  [facility]   Department:  Bx Nurse           INR as of 4/14/2017     Today's INR 5.0!      Anticoagulation Summary as of 4/14/2017     INR goal 2.5-3.5   Today's INR 5.0!   Full instructions 4/14: Hold; 4/15: Hold; Otherwise 5 mg on Mon; 7.5 mg all other days   Next INR check 4/19/2017    Indications   AF (atrial fibrillation) (H) [I48.91]  S/P mitral valve replacement [Z95.2]  Long-term (current) use of anticoagulants [Z79.01] [Z79.01]         Your next Anticoagulation Clinic appointment(s)     Apr 19, 2017  9:30 AM CDT   Anticoagulation Visit with  ANTICOAGULATION CLINIC   [facility] ([facility]    [address] [place] [id number] [phone]              Contact Numbers     [facility]  Please call  [phone] to cancel and/or reschedule your appointment   The direct line to the anticoagulant nurse is [phone] on Monday, Wednesday, and Friday. On Thursday, the anticoagulant nurse can be reached directly at [phone].         April 2017 Details    Sun Mon Tue Wed Thu Fri Sat           1                 2               3               4               5               6               7               8                 9               10               11               12               13               14      Hold   See details      15      Hold           16      7.5 mg         17      5 mg         18      7.5 mg         19            20               21               22                 23               24               25               26               27               28               29                 30                      Date Details   04/14 This INR check       Date of next INR:  4/19/2017         How to take your warfarin dose     To take:  5 mg Take 1 of the 5 mg  tablets.    To take:  7.5 mg Take 1.5 of the 5 mg tablets.    Hold Do not take your warfarin dose. See the Details table to the right for additional instructions.

## 2017-04-19 ENCOUNTER — ANTICOAGULATION THERAPY VISIT (OUTPATIENT)
Dept: NURSING | Facility: CLINIC | Age: 77
End: 2017-04-19
Payer: MEDICARE

## 2017-04-19 LAB — INR POINT OF CARE: 2 (ref 2.5–3.5)

## 2017-04-19 PROCEDURE — 99207 ZZC NO CHARGE NURSE ONLY: CPT

## 2017-04-19 PROCEDURE — 36416 COLLJ CAPILLARY BLOOD SPEC: CPT

## 2017-04-19 PROCEDURE — 85610 PROTHROMBIN TIME: CPT | Mod: QW

## 2017-04-19 NOTE — MR AVS SNAPSHOT
Fausto Carson Yordan   4/19/2017 9:30 AM   Anticoagulation Therapy Visit    Description:  76 year old male   Provider:  [facility]   Department:  Bx Nurse           INR as of 4/19/2017     Today's INR 2.0!      Anticoagulation Summary as of 4/19/2017     INR goal 2.5-3.5   Today's INR 2.0!   Full instructions 4/19: 10 mg; Otherwise 5 mg on Mon; 7.5 mg all other days   Next INR check 5/8/2017    Indications   AF (atrial fibrillation) (H) [I48.91]  S/P mitral valve replacement [Z95.2]  Long-term (current) use of anticoagulants [Z79.01] [Z79.01]         Your next Anticoagulation Clinic appointment(s)     May 08, 2017 10:15 AM CDT   Anticoagulation Visit with  ANTICOAGULATION CLINIC   [facility] ([facility])    [address] [place] [id number] [phone]              Contact Numbers     Critical access hospital  Please call  [phone] to cancel and/or reschedule your appointment   The direct line to the anticoagulant nurse is [phone] on Monday, Wednesday, and Friday. On Thursday, the anticoagulant nurse can be reached directly at [phone].         April 2017 Details    Sun Mon Tue Wed Thu Fri Sat           1                 2               3               4               5               6               7               8                 9               10               11               12               13               14               15                 16               17               18               19      10 mg   See details      20      7.5 mg         21      7.5 mg         22      7.5 mg           23      7.5 mg         24      5 mg         25      7.5 mg         26      7.5 mg         27      7.5 mg         28      7.5 mg         29      7.5 mg           30      7.5 mg                Date Details   04/19 This INR check               How to take your warfarin dose     To take:  5 mg Take 1 of  the 5 mg tablets.    To take:  7.5 mg Take 1.5 of the 5 mg tablets.    To take:  10 mg Take 2 of the 5 mg tablets.           May 2017 Details    Sun Mon Tue Wed Thu Fri Sat      1      5 mg         2      7.5 mg         3      7.5 mg         4      7.5 mg         5      7.5 mg         6      7.5 mg           7      7.5 mg         8            9               10               11               12               13                 14               15               16               17               18               19               20                 21               22               23               24               25               26               27                 28               29               30               31                   Date Details   No additional details    Date of next INR:  5/8/2017         How to take your warfarin dose     To take:  5 mg Take 1 of the 5 mg tablets.    To take:  7.5 mg Take 1.5 of the 5 mg tablets.

## 2017-04-19 NOTE — PROGRESS NOTES
ANTICOAGULATION FOLLOW-UP CLINIC VISIT    Patient Name:  Fausto Farr  Date:  4/19/2017  Contact Type:  Face to Face    SUBJECTIVE:     Patient Findings     Positives No Problem Findings           OBJECTIVE    INR Protime   Date Value Ref Range Status   04/19/2017 2.0 (A) 2.5 - 3.5 Final       ASSESSMENT / PLAN  INR assessment SUB    Recheck INR In: 3 WEEKS    INR Location Clinic      Anticoagulation Summary as of 4/19/2017     INR goal 2.5-3.5   Today's INR 2.0!   Maintenance plan 5 mg (5 mg x 1) on Mon; 7.5 mg (5 mg x 1.5) all other days   Full instructions 4/19: 10 mg; Otherwise 5 mg on Mon; 7.5 mg all other days   Weekly total 50 mg   Plan last modified Gayatri Parmar RN (3/10/2017)   Next INR check 5/8/2017   Priority INR   Target end date Indefinite    Indications   AF (atrial fibrillation) (H) [I48.91]  S/P mitral valve replacement [Z95.2]  Long-term (current) use of anticoagulants [Z79.01] [Z79.01]         Anticoagulation Episode Summary     INR check location [facility]    Preferred lab     Send INR reminders to [facility] INR/PROTIME    Comments       Anticoagulation Care Providers     Provider Role Specialty Phone number    Nii Nelson MD [facility] [phone]            See the Encounter Report to view Anticoagulation Flowsheet and Dosing Calendar (Go to Encounters tab in chart review, and find the Anticoagulation Therapy Visit)        Gayatri Parmar RN

## 2017-04-25 ENCOUNTER — OFFICE VISIT (OUTPATIENT)
Dept: PEDIATRICS | Facility: CLINIC | Age: 77
End: 2017-04-25
Payer: MEDICARE

## 2017-04-25 VITALS
HEIGHT: 65 IN | SYSTOLIC BLOOD PRESSURE: 132 MMHG | HEART RATE: 71 BPM | BODY MASS INDEX: 37.32 KG/M2 | WEIGHT: 224 LBS | DIASTOLIC BLOOD PRESSURE: 70 MMHG | TEMPERATURE: 97.8 F | OXYGEN SATURATION: 96 %

## 2017-04-25 DIAGNOSIS — Z00.00 ROUTINE GENERAL MEDICAL EXAMINATION AT A HEALTH CARE FACILITY: ICD-10-CM

## 2017-04-25 DIAGNOSIS — Z12.5 ENCOUNTER FOR SCREENING FOR MALIGNANT NEOPLASM OF PROSTATE: ICD-10-CM

## 2017-04-25 DIAGNOSIS — Z95.2 S/P AORTIC VALVE REPLACEMENT: ICD-10-CM

## 2017-04-25 DIAGNOSIS — R73.09 ELEVATED GLUCOSE: ICD-10-CM

## 2017-04-25 DIAGNOSIS — I73.9 PVD (PERIPHERAL VASCULAR DISEASE) (H): ICD-10-CM

## 2017-04-25 DIAGNOSIS — K51.20 ULCERATIVE PROCTITIS WITHOUT COMPLICATION (H): ICD-10-CM

## 2017-04-25 DIAGNOSIS — C61 MALIGNANT NEOPLASM OF PROSTATE (H): ICD-10-CM

## 2017-04-25 DIAGNOSIS — I71.40 ABDOMINAL AORTIC ANEURYSM (AAA) WITHOUT RUPTURE (H): ICD-10-CM

## 2017-04-25 DIAGNOSIS — M54.50 ACUTE BILATERAL LOW BACK PAIN WITHOUT SCIATICA: ICD-10-CM

## 2017-04-25 DIAGNOSIS — I25.810 CORONARY ARTERY DISEASE INVOLVING CORONARY BYPASS GRAFT OF NATIVE HEART WITHOUT ANGINA PECTORIS: ICD-10-CM

## 2017-04-25 DIAGNOSIS — I48.20 CHRONIC ATRIAL FIBRILLATION (H): Primary | ICD-10-CM

## 2017-04-25 LAB
BASOPHILS # BLD AUTO: 0 10E9/L (ref 0–0.2)
BASOPHILS NFR BLD AUTO: 0.4 %
DIFFERENTIAL METHOD BLD: ABNORMAL
EOSINOPHIL # BLD AUTO: 0.3 10E9/L (ref 0–0.7)
EOSINOPHIL NFR BLD AUTO: 2.4 %
ERYTHROCYTE [DISTWIDTH] IN BLOOD BY AUTOMATED COUNT: 12.7 % (ref 10–15)
HBA1C MFR BLD: 5.9 % (ref 4.3–6)
HCT VFR BLD AUTO: 39.2 % (ref 40–53)
HGB BLD-MCNC: 12.9 G/DL (ref 13.3–17.7)
LYMPHOCYTES # BLD AUTO: 1.8 10E9/L (ref 0.8–5.3)
LYMPHOCYTES NFR BLD AUTO: 17.2 %
MCH RBC QN AUTO: 29.8 PG (ref 26.5–33)
MCHC RBC AUTO-ENTMCNC: 32.9 G/DL (ref 31.5–36.5)
MCV RBC AUTO: 91 FL (ref 78–100)
MONOCYTES # BLD AUTO: 1.4 10E9/L (ref 0–1.3)
MONOCYTES NFR BLD AUTO: 13.6 %
NEUTROPHILS # BLD AUTO: 7.1 10E9/L (ref 1.6–8.3)
NEUTROPHILS NFR BLD AUTO: 66.4 %
PLATELET # BLD AUTO: 282 10E9/L (ref 150–450)
RBC # BLD AUTO: 4.33 10E12/L (ref 4.4–5.9)
WBC # BLD AUTO: 10.6 10E9/L (ref 4–11)

## 2017-04-25 PROCEDURE — G0103 PSA SCREENING: HCPCS | Performed by: INTERNAL MEDICINE

## 2017-04-25 PROCEDURE — G0439 PPPS, SUBSEQ VISIT: HCPCS | Performed by: INTERNAL MEDICINE

## 2017-04-25 PROCEDURE — 83036 HEMOGLOBIN GLYCOSYLATED A1C: CPT | Performed by: INTERNAL MEDICINE

## 2017-04-25 PROCEDURE — 36415 COLL VENOUS BLD VENIPUNCTURE: CPT | Performed by: INTERNAL MEDICINE

## 2017-04-25 PROCEDURE — 80061 LIPID PANEL: CPT | Performed by: INTERNAL MEDICINE

## 2017-04-25 PROCEDURE — 85025 COMPLETE CBC W/AUTO DIFF WBC: CPT | Performed by: INTERNAL MEDICINE

## 2017-04-25 PROCEDURE — 80053 COMPREHEN METABOLIC PANEL: CPT | Performed by: INTERNAL MEDICINE

## 2017-04-25 RX ORDER — METOPROLOL TARTRATE 25 MG/1
25 TABLET, FILM COATED ORAL 2 TIMES DAILY
Refills: 3 | COMMUNITY
Start: 2017-04-25 | End: 2017-07-17

## 2017-04-25 RX ORDER — MESALAMINE 800 MG/1
1 TABLET, DELAYED RELEASE ORAL 2 TIMES DAILY
Qty: 180 TABLET | Refills: 3 | Status: SHIPPED | OUTPATIENT
Start: 2017-04-25 | End: 2018-05-08

## 2017-04-25 NOTE — PROGRESS NOTES
SUBJECTIVE:                                                            Fausto Farr is a 76 year old male who presents for Preventive Visit.    Are you in the first 12 months of your Medicare coverage?  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 Has been on mesalamine without issues, no hematochezia or melena noted. No abdominal pain.    Atrial fibrillation: has bee on warfarin for this. Rate controlled with metoprolol. No chest pain or shortness of breath. No lower extremity edema.    Leg pain: has noted history of PVD in chart, on warfarin, does get pain with walking, better with leaning forward. History of spinal stenosis, seen by Dr. Wise in the past for spinal surgery (about 1.5 year ago).    AAA: s/p repair, following with periodic CT scans, no leaking of grafting.     CAD: following with cardiology, on aspirin with warfarin. On Crestor 40 mg per day. Taking zetia as well. 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     Prostate cancer: had radiation seeds placed in the past, has been following PSA, no changes in urination, no dysuria. No hematuria.     COGNITIVE SCREEN  1) Repeat 3 items (Banana, Sunrise, Chair)    2) Clock draw: NORMAL  3) 3 item recall: Recalls 3 objects  Results: 3 items recalled: COGNITIVE IMPAIRMENT LESS LIKELY    Mini-CogTM Copyright S Mamadou. Licensed by the author for use in [facility]; reprinted with permission (sandi@.[facility]). All rights reserved.      Right wrist pain: has increased some twisting and pain in the right wrist. Does get injections in this thumb at times. Has been icing some.     Back or hip troubles: fees  as though effort to walk at times. Legs feel tired and feels some pain in the hips,     Dr. Salvador fused the lower vertebrae and also cleaned out spinal stenosis. No paresthesias in the areas. On shopping cart with leading helps some, walking makes difficult.      CV: due to see heart     HYPERTENSION: blood pressure was low, now stable    Ulcerative colitis: on mesalamine    Reviewed and updated as needed this visit by clinical staff         Reviewed and updated as needed this visit by Provider        Social History   Substance Use Topics     Smoking status: Former Smoker     Packs/day: 1.00     Years: 40.00     Quit date: 10/23/2002     Smokeless tobacco: Never Used     Alcohol use Yes      Comment: a couple beers per week       The patient does not drink >3 drinks per day nor >7 drinks per week.      Today's PHQ-2 Score:   PHQ-2 ( 1999 Pfizer) 4/25/2017   Q1: Little interest or pleasure in doing things -   Q2: Feeling down, depressed or hopeless -   PHQ-2 Score -   Little interest or pleasure in doing things Not at all   Feeling down, depressed or hopeless Not at all   PHQ-2 Score 0        Do you feel safe in your environment - Yes    Do you have a Health Care Directive?: No: Advance care planning was reviewed with patient; patient declined at this time.    Current providers sharing in care for this patient include:   Patient Care Team:  Nii Nelson MD as PCP - General (Family Practice)  Susan Carrillo RN as Clinic Care Coordinator  Viet Singh MD as MD (Specialist)  Calderon Santo MD as MD (Cardiology)  Elder Dao, ADELE CNP as Nurse Practitioner (Nurse Practitioner)  Walt Garcia MD as MD (Orthopedics)      Hearing impairment: No    Ability to successfully perform activities of daily living: Yes, no assistance needed     Fall risk:       Home safety:  none identified  click delete button to remove this line now    The following health maintenance items are reviewed in Epic and  "correct as of today:  Health Maintenance   Topic Date Due     OP ANNUAL INR REFERRAL  05/11/2016     BMP Q6 MOS (NO INBASKET)  01/18/2017     CBC Q1 YR (NO INBASKET)  03/21/2017     ALT Q1 YR (NO INBASKET)  07/18/2017     LIPID MONITORING Q1 YEAR( NO INBASKET)  07/18/2017     INFLUENZA VACCINE (SYSTEM ASSIGNED)  09/01/2017     FALL RISK ASSESSMENT  04/14/2018     HF ACTION PLAN Q3 YR (NO INBASKET MSG)  07/18/2019     ADVANCE DIRECTIVE PLANNING Q5 YRS (NO INBASKET)  10/09/2020     COLON CANCER SCREEN (SYSTEM ASSIGNED)  08/22/2021     TETANUS IMMUNIZATION (SYSTEM ASSIGNED)  06/09/2023     PNEUMOCOCCAL  Completed     AORTIC ANEURYSM SCREENING (SYSTEM ASSIGNED)  Completed         Pneumonia Vaccine:Adults age 65+ who received Pneumovax (PPSV23) at 65 years or older: Should be given PCV13 > 1 year after their most recent PPSV23     ROS:  Constitutional, HEENT, cardiovascular, pulmonary, GI, , musculoskeletal, neuro, skin, endocrine and psych systems are negative, except as otherwise noted.    Problem list, Medication list, Allergies, and Medical/Social/Surgical histories reviewed in [facility] and updated as appropriate.  OBJECTIVE:                                                            /70 (BP Location: Right arm, Cuff Size: Adult Large)  Pulse 71  Temp 97.8  F (36.6  C) (Oral)  Ht 5' 5\" (1.651 m)  Wt 224 lb (101.6 kg)  SpO2 96%  BMI 37.28 kg/m2 Estimated body mass index is 37.44 kg/(m^2) as calculated from the following:    Height as of 4/14/17: 5' 5\" (1.651 m).    Weight as of 4/14/17: 225 lb (102.1 kg).  EXAM:   GENERAL: healthy, alert and no distress  EYES: Eyes grossly normal to inspection, PERRL and conjunctivae and sclerae normal  NECK: no adenopathy, no asymmetry, masses, or scars and thyroid normal to palpation  RESP: lungs clear to auscultation - no rales, rhonchi or wheezes  CV: irregularly irregular, mechanical heart sounds, no loud murmur  ABDOMEN: soft, nontender, no hepatosplenomegaly, no " masses and bowel sounds normal  MS: no gross musculoskeletal defects noted, no edema  SKIN: no suspicious lesions or rashes  NEURO: Normal strength and tone, mentation intact and speech normal  PSYCH: mentation appears normal, affect normal/bright    ASSESSMENT / PLAN:                                                            1. 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 Chronic atrial fibrillation (H)  Rate controlled today, INR goal 2.5-3.5 with valve  - CBC with platelets differential  - INR CLINIC REFERRAL  - HC LEVEL 3 ESTABLISHED PATIENT    3. Malignant neoplasm of prostate (H)  PSA normal today, continue to follow  - HC LEVEL 3 ESTABLISHED PATIENT    4. Abdominal aortic aneurysm (AAA) without rupture (H)  Will get US to check KEERTHI, will recheck with spine surgeon as well as could fit with lumbar stenosis   - US ankle brachial indices extremity complete (vascular lab); Future  - HC LEVEL 3 ESTABLISHED PATIENT    5. Elevated glucose  Will screen for diabetes  - Hemoglobin A1c  - HC LEVEL 3 ESTABLISHED PATIENT    6. Coronary artery disease involving coronary bypass graft 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  Continue current therapy with ACE, beta blocker, statin, aspirin  - metoprolol (LOPRESSOR) 25 MG tablet; Take 1 tablet (25 mg) by mouth 2 times daily; Refill: 3  - HC LEVEL 3 ESTABLISHED PATIENT    7. Encounter for screening for malignant neoplasm of prostate   - PSA, screen  - HC LEVEL 3 ESTABLISHED PATIENT    8. Acute bilateral low back pain without sciatica  - US ankle brachial indices extremity complete (vascular lab); Future  - HC LEVEL 3 ESTABLISHED PATIENT    9. Routine general medical examination at a health care facility  Discussed routine health maintenance as well   - PSA, screen  - CBC with platelets  "differential  - Hemoglobin A1c  - Lipid panel reflex to direct LDL  - Comprehensive metabolic panel    10. PVD (peripheral vascular disease) (H)  Will check screening for this as well   - US ankle brachial indices extremity complete (vascular lab); Future  - HC LEVEL 3 ESTABLISHED PATIENT    11. S/P aortic valve replacement  - INR CLINIC REFERRAL  - HC LEVEL 3 ESTABLISHED PATIENT    End of Life Planning:  Patient currently has an advanced directive: Yes.  Practitioner is supportive of decision.    COUNSELING:  Reviewed preventive health counseling, as reflected in patient instructions        Estimated body mass index is 37.44 kg/(m^2) as calculated from the following:    Height as of 4/14/17: 5' 5\" (1.651 m).    Weight as of 4/14/17: 225 lb (102.1 kg).  Weight management plan: Discussed healthy diet and exercise guidelines and patient will follow up in 12 months in clinic to re-evaluate.   reports that he quit smoking about 14 years ago. He has a 40.00 pack-year smoking history. He has never used smokeless tobacco.      Appropriate preventive services were discussed with this patient, including applicable screening as appropriate for cardiovascular disease, diabetes, osteopenia/osteoporosis, and glaucoma.  As appropriate for age/gender, discussed screening for colorectal cancer, prostate cancer, breast cancer, and cervical cancer. Checklist reviewing preventive services available has been given to the patient.    Reviewed patients plan of care and provided an AVS. The Complex Care Plan (for patients with higher acuity and needing more deliberate coordination of services) for Fausto meets the Care Plan requirement. This Care Plan has been established and reviewed with the Patient.    Counseling Resources:  ATP IV Guidelines  Pooled Cohorts Equation Calculator  Breast Cancer Risk Calculator  FRAX Risk Assessment  ICSI Preventive Guidelines  Dietary Guidelines for Americans, 2010  USDA's MyPlate  ASA Prophylaxis  Lung " CA Screening    Tu Reyes MD, MD  [facility]

## 2017-04-25 NOTE — PATIENT INSTRUCTIONS
1) Labs today including PSA, cholesterol, hemoglobin, liver and kidney functions    2) Call back doctor to get back in for recheck    3) Call orthopedics to get for injection of thumb area    4) Ultrasound of the legs to be done at [facility], they will call you to set this up.    Tu Reyes MD      Preventive Health Recommendations:   Male Ages 65 and over    Yearly exam:             See your health care provider every year in order to  o   Review health changes.   o   Discuss preventive care.    o   Review your medicines if your doctor has prescribed any.    Talk with your health care provider about whether you should have a test to screen for prostate cancer (PSA).    Every 3 years, have a diabetes test (fasting glucose). If you are at risk for diabetes, you should have this test more often.    Every 5 years, have a cholesterol test. Have this test more often if you are at risk for high cholesterol or heart disease.     Every 10 years, have a colonoscopy. Or, have a yearly FIT test (stool test). These exams will check for colon cancer.    Talk to with your health care provider about screening for Abdominal Aortic Aneurysm if you have a family history of AAA or have a history of smoking.    Shots:     Get a flu shot each year.     Get a tetanus shot every 10 years.     Talk to your doctor about your pneumonia vaccines. There are now two you should receive - Pneumovax (PPSV 23) and Prevnar (PCV 13).     Talk to your doctor about a shingles vaccine.     Talk to your doctor about the hepatitis B vaccine.  Nutrition:     Eat at least 5 servings of fruits and vegetables each day.     Eat whole-grain bread, whole-wheat pasta and brown rice instead of white grains and rice.     Talk to your provider about Calcium and Vitamin D.   Lifestyle    Exercise for at least 150 minutes a week (30 minutes a day, 5 days a week). This will help you control your weight and prevent disease.     Limit alcohol to one drink per day.     No  smoking.     Wear sunscreen to prevent skin cancer.     See your dentist every six months for an exam and cleaning.     See your eye doctor every 1 to 2 years to screen for conditions such as glaucoma, macular degeneration, cataracts, etc

## 2017-04-25 NOTE — MR AVS SNAPSHOT
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)        Elevated glucose        Routine general medical examination at a health care facility        Encounter for screening for malignant neoplasm of prostate         Acute bilateral low back pain without sciatica        Abdominal aortic aneurysm (AAA) without rupture (H)        PVD (peripheral vascular disease) (H)          Care Instructions    1) Labs today including PSA, cholesterol, hemoglobin, liver and kidney functions    2) Call back doctor to get back in for recheck    3) Call orthopedics to get for injection of thumb area    4) Ultrasound of the legs to be done at [facility], they will call you to set this up.    Tu Reyes MD      Preventive Health Recommendations:   Male Ages 65 and over    Yearly exam:             See your health care provider every year in order to  o   Review health changes.   o   Discuss preventive care.    o   Review your medicines if your doctor has prescribed any.    Talk with your health care provider about whether you should have a test to screen for prostate cancer (PSA).    Every 3 years, have a diabetes test (fasting glucose). If you are at risk for diabetes, you should have this test more often.    Every 5 years, have a cholesterol test. Have this test more often if you are at risk for high cholesterol or heart disease.     Every 10 years, have a colonoscopy. Or, have a yearly FIT test (stool test). These exams will check for colon cancer.    Talk to with your health care provider about  screening for Abdominal Aortic Aneurysm if you have a family history of AAA or have a history of smoking.    Shots:     Get a flu shot each year.     Get a tetanus shot every 10 years.     Talk to your doctor about your pneumonia vaccines. There are now two you should receive - Pneumovax (PPSV 23) and Prevnar (PCV 13).     Talk to your doctor about a shingles vaccine.     Talk to your doctor about the hepatitis B vaccine.  Nutrition:     Eat at least 5 servings of fruits and vegetables each day.     Eat whole-grain bread, whole-wheat pasta and brown rice instead of white grains and rice.     Talk to your provider about Calcium and Vitamin D.   Lifestyle    Exercise for at least 150 minutes a week (30 minutes a day, 5 days a week). This will help you control your weight and prevent disease.     Limit alcohol to one drink per day.     No smoking.     Wear sunscreen to prevent skin cancer.     See your dentist every six months for an exam and cleaning.     See your eye doctor every 1 to 2 years to screen for conditions such as glaucoma, macular degeneration, cataracts, etc         Follow-ups after your visit        Additional Services     INR CLINIC REFERRAL       Your provider has referred you to INR Services.    Please be aware that coverage of these services is subject to the terms and limitations of your health insurance plan.  Call member services at your health plan with any benefit or coverage questions.    Indication for Anticoagulation: Peripheral Vascular Disease and atrial fibrillation   If nonstandard INR is desired, indicate goal range and explanation:   Expected Duration of Therapy: Lifetime                  Your next 10 appointments already scheduled     May 08, 2017  9:30 AM CDT   Anticoagulation Visit with  ANTICOAGULATION CLINIC   Mariano Whitlock ([facility] [place])    [address] [place] [address]  [address]  [place] [address] [phone]              Future tests that were  "ordered for you today     Open Future Orders        Priority Expected Expires Ordered    US ankle brachial indices extremity complete (vascular lab) Routine  2018            Who to contact     If you have questions or need follow up information about today's clinic visit or your schedule please contact [facility] [place] directly at [phone].  Normal or non-critical lab and imaging results will be communicated to you by MyChart, letter or phone within 4 business days after the clinic has received the results. If you do not hear from us within 7 days, please contact the clinic through [employer] or phone. If you have a critical or abnormal lab result, we will notify you by phone as soon as possible.  Submit refill requests through [email or web address] or call your pharmacy and they will forward the refill request to us. Please allow 3 business days for your refill to be completed.          Additional Information About Your Visit        [employer] Information     [email or web address] lets you send messages to your doctor, view your test results, renew your prescriptions, schedule appointments and more. To sign up, go to [email or web address] . Click on \"Log in\" on the left side of the screen, which will take you to the Welcome page. Then click on \"Sign up Now\" on the right side of the page.     You will be asked to enter the access code listed below, as well as some personal information. Please follow the directions to create your username and password.     Your access code is: [email or web address]  Expires: 2017 10:08 AM     Your access code will  in 90 days. If you need help or a new code, please call your [facility] clinic or [phone].        Care EveryWhere ID     This is your Care EveryWhere ID. This could be used by other organizations to access your [facility] medical records  [id number]        Your Vitals Were     Pulse Temperature Height Pulse Oximetry BMI (Body Mass Index)       71 97.8  F (36.6  C) (Oral) 5' " "5\" (1.651 m) 96% 37.28 kg/m2        Blood Pressure from Last 3 Encounters:   04/25/17 132/70   04/14/17 112/60   09/06/16 130/80    Weight from Last 3 Encounters:   04/25/17 224 lb (101.6 kg)   04/14/17 225 lb (102.1 kg)   09/06/16 219 lb (99.3 kg)              We Performed the Following     CBC with platelets differential     Comprehensive metabolic panel     Hemoglobin A1c     INR CLINIC REFERRAL     Lipid panel reflex to direct LDL     PSA, screen          Today's Medication Changes          These changes are accurate as of: 4/25/17 12:42 PM.  If you have any questions, ask your nurse or doctor.               These medicines have changed or have updated prescriptions.        Dose/Directions    mesalamine 800 MG EC tablet   Commonly known as:  ASACOL HD   This may have changed:  See the new instructions.   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)   Changed by:  Tu Reyes MD        Dose:  1 tablet   Take 1 tablet (800 mg) by mouth 2 times daily   Quantity:  180 tablet   Refills:  3       metoprolol 25 MG tablet   Commonly known as:  LOPRESSOR   This may have changed:  how much to take   Used for:  Coronary artery disease involving coronary bypass graft 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   Changed by:  Tu Reyes MD        Dose:  25 mg   Take 1 tablet (25 mg) by mouth 2 times daily   Refills:  3            Where to get your medicines      These medications were sent to [id number] - [place] - [address]  AT [address] [address] [facility]  [address] [place] [address]     Phone:  [phone]     mesalamine 800 MG EC tablet                Primary Care Provider Office Phone # Fax #    Tu Reyes [phone]       [facility] [place] [address] [place] [address]        Thank you!     Thank you for choosing [facility] [place]  for your care. Our goal is always to provide you with excellent care. " Hearing back from our patients is one way we can continue to improve our services. Please take a few minutes to complete the written survey that you may receive in the mail after your visit with us. Thank you!             Your Updated Medication List - Protect others around you: Learn how to safely use, store and throw away your medicines at www.disposemymeds.org.          This list is accurate as of: 4/25/17 12:42 PM.  Always use your most recent med list.                   Brand Name Dispense Instructions for use    ASPIRIN EC PO      Take 81 mg by mouth every evening       betamethasone valerate 0.1 % lotion    VALISONE    60 mL    APPLY TOPICALLY TWICE DAILY PRN       calcium carb 1250 mg (500 mg [place])/vitamin D 200 units 500-200 MG-UNIT per tablet    OSCAL with D    90 tablet    Take 1 tablet by mouth 3 times daily (with meals)       CRESTOR 40 MG tablet   Generic drug:  rosuvastatin     90 tablet    TAKE 1 TABLET BY MOUTH DAILY.       erythromycin ophthalmic ointment    ROMYCIN     AURELIA A SMALL AMOUNT IN BOTH EYES ONCE IN THE EVENING FOR 30 DAYS       fluocinonide 0.05 % ointment    LIDEX    60 g    2- 30 gram tubes.  Apply twice a day as needed.       furosemide 40 MG tablet    LASIX    45 tablet    Take 0.5 tablets (20 mg) by mouth daily       lisinopril 2.5 MG tablet    PRINIVIL/Zestril    90 tablet    Take 1 tablet (2.5 mg) by mouth daily       mesalamine 800 MG EC tablet    ASACOL HD    180 tablet    Take 1 tablet (800 mg) by mouth 2 times daily       metoprolol 25 MG tablet    LOPRESSOR     Take 1 tablet (25 mg) by mouth 2 times daily       OMEGA-3 FISH OIL PO      Take 2 g by mouth 2 times daily (with meals)       tamsulosin 0.4 MG capsule    FLOMAX    90 capsule    TAKE 1 CAPSULE BY MOUTH EVERY DAY       warfarin 5 MG tablet    COUMADIN    135 tablet    TAKE 1 AND 1/2 TABLETS BY MOUTH DAILY OR AS DIRECTED       ZETIA 10 MG tablet   Generic drug:  ezetimibe     90 tablet    TAKE 1 TABLET BY MOUTH DAILY

## 2017-04-25 NOTE — NURSING NOTE
"Chief Complaint   Patient presents with     Medicare Visit       Initial /70 (BP Location: Right arm, Cuff Size: Adult Large)  Pulse 71  Temp 97.8  F (36.6  C) (Oral)  Ht 5' 5\" (1.651 m)  Wt 224 lb (101.6 kg)  SpO2 96%  BMI 37.28 kg/m2 Estimated body mass index is 37.28 kg/(m^2) as calculated from the following:    Height as of this encounter: 5' 5\" (1.651 m).    Weight as of this encounter: 224 lb (101.6 kg).  Medication Reconciliation: complete   Nahomy Song MA    "

## 2017-04-25 NOTE — LETTER
[facility]  [address]  [place] [address]                  [phone]   April 26, 2017    Fausto Farr  [address]  [place] [address]      Dear Fausto,     Here are the results from the recent Labs that we did.     Your PSA testing for prostate cancer was normal.     Your cholesterol was in a reasonable range. Your triglycerides were slightly up which can be from eating pastas and other complex carbohydrates.     Your diabetes test was normal.     Your hemoglobin was improved from previous ranges, we should continue to follow this.     I placed the INR referral for the nurse as well. We should get you in for this soon with adjustment of warfarin as needed.     Let me know if you have questions or concerns!     Sincerely,       Tu Reyes MD   Internal Medicine and Pediatrics       Results for orders placed or performed in visit on 04/25/17   PSA, screen   Result Value Ref Range    PSA  0 - 4 ug/L     <0.01  Assay Method:  Chemiluminescence using Siemens Vista analyzer     CBC with platelets differential   Result Value Ref Range    WBC 10.6 4.0 - 11.0 10e9/L    RBC Count 4.33 (L) 4.4 - 5.9 10e12/L    Hemoglobin 12.9 (L) 13.3 - 17.7 g/dL    Hematocrit 39.2 (L) 40.0 - 53.0 %    MCV 91 78 - 100 fl    MCH 29.8 26.5 - 33.0 pg    MCHC 32.9 31.5 - 36.5 g/dL    RDW 12.7 10.0 - 15.0 %    Platelet Count 282 150 - 450 10e9/L    Diff Method Automated Method     % Neutrophils 66.4 %    % Lymphocytes 17.2 %    % Monocytes 13.6 %    % Eosinophils 2.4 %    % Basophils 0.4 %    Absolute Neutrophil 7.1 1.6 - 8.3 10e9/L    Absolute Lymphocytes 1.8 0.8 - 5.3 10e9/L    Absolute Monocytes 1.4 (H) 0.0 - 1.3 10e9/L    Absolute Eosinophils 0.3 0.0 - 0.7 10e9/L    Absolute Basophils 0.0 0.0 - 0.2 10e9/L   Hemoglobin A1c   Result Value Ref Range    Hemoglobin A1C 5.9 4.3 - 6.0 %   Lipid panel reflex to direct LDL   Result Value Ref Range    Cholesterol 134 <200 mg/dL    Triglycerides 206  (H) <150 mg/dL    HDL Cholesterol 36 (L) >39 mg/dL    LDL Cholesterol Calculated 57 <100 mg/dL    Non HDL Cholesterol 98 <130 mg/dL   Comprehensive metabolic panel   Result Value Ref Range    Sodium 139 133 - 144 mmol/L    Potassium 4.5 3.4 - 5.3 mmol/L    Chloride 104 94 - 109 mmol/L    Carbon Dioxide 23 20 - 32 mmol/L    Anion Gap 12 3 - 14 mmol/L    Glucose 96 70 - 99 mg/dL    Urea Nitrogen 19 7 - 30 mg/dL    Creatinine 1.00 0.66 - 1.25 mg/dL    GFR Estimate 73 >60 mL/min/1.7m2    GFR Estimate If Black 88 >60 mL/min/1.7m2    Calcium 9.1 8.5 - 10.1 mg/dL    Bilirubin Total 0.4 0.2 - 1.3 mg/dL    Albumin 4.2 3.4 - 5.0 g/dL    Protein Total 7.9 6.8 - 8.8 g/dL    Alkaline Phosphatase 50 40 - 150 U/L    ALT 27 0 - 70 U/L    AST 25 0 - 45 U/L

## 2017-04-26 LAB
ALBUMIN SERPL-MCNC: 4.2 G/DL (ref 3.4–5)
ALP SERPL-CCNC: 50 U/L (ref 40–150)
ALT SERPL W P-5'-P-CCNC: 27 U/L (ref 0–70)
ANION GAP SERPL CALCULATED.3IONS-SCNC: 12 MMOL/L (ref 3–14)
AST SERPL W P-5'-P-CCNC: 25 U/L (ref 0–45)
BILIRUB SERPL-MCNC: 0.4 MG/DL (ref 0.2–1.3)
BUN SERPL-MCNC: 19 MG/DL (ref 7–30)
CALCIUM SERPL-MCNC: 9.1 MG/DL (ref 8.5–10.1)
CHLORIDE SERPL-SCNC: 104 MMOL/L (ref 94–109)
CHOLEST SERPL-MCNC: 134 MG/DL
CO2 SERPL-SCNC: 23 MMOL/L (ref 20–32)
CREAT SERPL-MCNC: 1 MG/DL (ref 0.66–1.25)
GFR SERPL CREATININE-BSD FRML MDRD: 73 ML/MIN/1.7M2
GLUCOSE SERPL-MCNC: 96 MG/DL (ref 70–99)
HDLC SERPL-MCNC: 36 MG/DL
LDLC SERPL CALC-MCNC: 57 MG/DL
NONHDLC SERPL-MCNC: 98 MG/DL
POTASSIUM SERPL-SCNC: 4.5 MMOL/L (ref 3.4–5.3)
PROT SERPL-MCNC: 7.9 G/DL (ref 6.8–8.8)
PSA SERPL-ACNC: NORMAL UG/L (ref 0–4)
SODIUM SERPL-SCNC: 139 MMOL/L (ref 133–144)
TRIGL SERPL-MCNC: 206 MG/DL

## 2017-05-15 ENCOUNTER — ANTICOAGULATION THERAPY VISIT (OUTPATIENT)
Dept: NURSING | Facility: CLINIC | Age: 77
End: 2017-05-15
Payer: MEDICARE

## 2017-05-15 DIAGNOSIS — Z95.2 S/P MITRAL VALVE REPLACEMENT: ICD-10-CM

## 2017-05-15 DIAGNOSIS — I48.91 AF (ATRIAL FIBRILLATION) (H): ICD-10-CM

## 2017-05-15 DIAGNOSIS — Z79.01 LONG-TERM (CURRENT) USE OF ANTICOAGULANTS: ICD-10-CM

## 2017-05-15 LAB — INR POINT OF CARE: 3.1 (ref 0.86–1.14)

## 2017-05-15 PROCEDURE — 36416 COLLJ CAPILLARY BLOOD SPEC: CPT

## 2017-05-15 PROCEDURE — 85610 PROTHROMBIN TIME: CPT | Mod: QW

## 2017-05-15 PROCEDURE — 99207 ZZC NO CHARGE NURSE ONLY: CPT

## 2017-05-15 NOTE — MR AVS SNAPSHOT
Fausto Farr   5/15/2017 1:30 PM   Anticoagulation Therapy Visit    Description:  77 year old male   Provider:  [facility]   Department:  [facility]           INR as of 5/15/2017     Today's INR 3.1      Anticoagulation Summary as of 5/15/2017     INR goal 2.5-3.5   Today's INR 3.1   Full instructions 5 mg on Mon; 7.5 mg all other days   Next INR check 6/15/2017    Indications   AF (atrial fibrillation) (H) [I48.91]  S/P mitral valve replacement [Z95.2]  Long-term (current) use of anticoagulants [Z79.01] [Z79.01]         Your next Anticoagulation Clinic appointment(s)     Mike 15, 2017  8:45 AM CDT   Anticoagulation Visit with  ANTICOAGULATION CLINIC   [facility] [place] [facility]    [address] [place] [address]  [address]  [facility] [address] [phone]              Contact Numbers     [facility]  Please call  [phone] to cancel and/or reschedule your appointment   Please call  [phone] with any problems or questions regarding your therapy.        May 2017 Details    Sun Mon Tue Wed Thu Fri Sat      1               2               3               4               5               6                 7               8               9               10               11               12               13                 14               15      5 mg   See details      16      7.5 mg         17      7.5 mg         18      7.5 mg         19      7.5 mg         20      7.5 mg           21      7.5 mg         22      5 mg         23      7.5 mg         24      7.5 mg         25      7.5 mg         26      7.5 mg         27      7.5 mg           28      7.5 mg         29      5 mg         30      7.5 mg         31      7.5 mg             Date Details   05/15 This INR check               How to take your warfarin dose     To take:  5 mg Take 1 of the 5 mg tablets.    To take:  7.5 mg Take 1.5 of the 5 mg tablets.           June 2017 Details    Sun Mon Tue Wed Thu Fri Sat          1      7.5 mg         2      7.5 mg         3      7.5 mg           4      7.5 mg         5      5 mg         6      7.5 mg         7      7.5 mg         8      7.5 mg         9      7.5 mg         10      7.5 mg           11      7.5 mg         12      5 mg         13      7.5 mg         14      7.5 mg         15            16               17                 18               19               20               21               22               23               24                 25               26               27               28               29               30                 Date Details   No additional details    Date of next INR:  6/15/2017         How to take your warfarin dose     To take:  5 mg Take 1 of the 5 mg tablets.    To take:  7.5 mg Take 1.5 of the 5 mg tablets.

## 2017-05-15 NOTE — PROGRESS NOTES
ANTICOAGULATION FOLLOW-UP CLINIC VISIT    Patient Name:  Fausto Farr  Date:  5/15/2017  Contact Type:  Face to Face    SUBJECTIVE:     Patient Findings     Positives Dental/Other procedures (root canal 5/16/17 - will notify dentist of INR today to determine if OK for procedure)           OBJECTIVE    INR Protime   Date Value Ref Range Status   05/15/2017 3.1 (A) 0.86 - 1.14 Final       ASSESSMENT / PLAN  INR assessment THER    Recheck INR In: 4 WEEKS    INR Location Clinic      Anticoagulation Summary as of 5/15/2017     INR goal 2.5-3.5   Today's INR 3.1   Maintenance plan 5 mg (5 mg x 1) on Mon; 7.5 mg (5 mg x 1.5) all other days   Full instructions 5 mg on Mon; 7.5 mg all other days   Weekly total 50 mg   Plan last modified Gayatri Parmar RN (3/10/2017)   Next INR check 6/15/2017   Priority INR   Target end date Indefinite    Indications   AF (atrial fibrillation) (H) [I48.91]  S/P mitral valve replacement [Z95.2]  Long-term (current) use of anticoagulants [Z79.01] [Z79.01]         Anticoagulation Episode Summary     INR check location [facility]    Preferred lab     Send INR reminders to [facility] INR/PROTIME    Comments       Anticoagulation Care Providers     Provider Role Specialty Phone number    Nii Nelson MD [facility] [phone]            See the Encounter Report to view Anticoagulation Flowsheet and Dosing Calendar (Go to Encounters tab in chart review, and find the Anticoagulation Therapy Visit)        Antonette Price RN

## 2017-05-23 DIAGNOSIS — E78.5 HYPERLIPIDEMIA: ICD-10-CM

## 2017-05-23 NOTE — TELEPHONE ENCOUNTER
ROSUVASTATIN CALCIUM 40MGTABLETS    Last Written Prescription Date: 05/10/2016  Last Fill Quantity: 90, # refills: 3  Last Office Visit with [facility], [facility] or [facility] prescribing provider: 04/14/2017  Next 5 appointments (look out 90 days)     Jul 27, 2017  9:00 AM CDT   Return Visit with Calderon Santo MD   [facility] [place] ([facility] Clinics)    [address] [address] [address] [phone]                   Lab Results   Component Value Date    CHOL 134 04/25/2017     Lab Results   Component Value Date    HDL 36 04/25/2017     Lab Results   Component Value Date    LDL 57 04/25/2017     Lab Results   Component Value Date    TRIG 206 04/25/2017     Lab Results   Component Value Date    CHOLHDLRATIO 3.6 06/03/2015

## 2017-05-25 RX ORDER — ROSUVASTATIN CALCIUM 40 MG/1
TABLET, COATED ORAL
Qty: 90 TABLET | Refills: 2 | Status: SHIPPED | OUTPATIENT
Start: 2017-05-25 | End: 2018-02-12

## 2017-05-30 DIAGNOSIS — Z95.2 S/P AORTIC VALVE REPLACEMENT: ICD-10-CM

## 2017-05-30 DIAGNOSIS — Z79.01 LONG-TERM (CURRENT) USE OF ANTICOAGULANTS: ICD-10-CM

## 2017-05-31 RX ORDER — WARFARIN SODIUM 5 MG/1
TABLET ORAL
Qty: 135 TABLET | Refills: 0 | OUTPATIENT
Start: 2017-05-31

## 2017-05-31 RX ORDER — WARFARIN SODIUM 5 MG/1
TABLET ORAL
Qty: 135 TABLET | Refills: 1 | Status: SHIPPED | OUTPATIENT
Start: 2017-05-31 | End: 2018-05-08

## 2017-05-31 NOTE — TELEPHONE ENCOUNTER
Warfarin 5 mg    Last Written Prescription Date: 2/21/17  Last Fill Qty: 135, # refills: 0  Last Office Visit with [facility] or [facility] prescribing provider: 4/14/17  Next 5 appointments (look out 90 days)     Jul 27, 2017  9:00 AM CDT   Return Visit with Calderon Santo MD   [facility] AT [place] ([facility] Clinics)    [address] [address] [phone]                   Date and Result of Last PT/INR:   Lab Results   Component Value Date    INR 3.1 05/15/2017    INR 2.0 04/19/2017    INR 2.36 11/07/2015    INR 2.05 11/06/2015    PT 11.6 09/25/2012    PT 24.8 09/17/2012

## 2017-06-15 ENCOUNTER — ANTICOAGULATION THERAPY VISIT (OUTPATIENT)
Dept: NURSING | Facility: CLINIC | Age: 77
End: 2017-06-15
Payer: MEDICARE

## 2017-06-15 DIAGNOSIS — Z95.2 S/P MITRAL VALVE REPLACEMENT: ICD-10-CM

## 2017-06-15 DIAGNOSIS — Z79.01 LONG-TERM (CURRENT) USE OF ANTICOAGULANTS: ICD-10-CM

## 2017-06-15 DIAGNOSIS — I48.91 AF (ATRIAL FIBRILLATION) (H): ICD-10-CM

## 2017-06-15 LAB — INR POINT OF CARE: 2.6 (ref 0.86–1.14)

## 2017-06-15 PROCEDURE — 99207 ZZC NO CHARGE NURSE ONLY: CPT

## 2017-06-15 PROCEDURE — 85610 PROTHROMBIN TIME: CPT | Mod: QW

## 2017-06-15 PROCEDURE — 36416 COLLJ CAPILLARY BLOOD SPEC: CPT

## 2017-06-15 NOTE — MR AVS SNAPSHOT
Fausto Farr   6/15/2017 8:45 AM   Anticoagulation Therapy Visit    Description:  77 year old male   Provider:  [facility]   Department:  [facility]           INR as of 6/15/2017     Today's INR 2.6      Anticoagulation Summary as of 6/15/2017     INR goal 2.5-3.5   Today's INR 2.6   Full instructions 5 mg on Mon; 7.5 mg all other days   Next INR check 7/13/2017    Indications   AF (atrial fibrillation) (H) [I48.91]  S/P mitral valve replacement [Z95.2]  Long-term (current) use of anticoagulants [Z79.01] [Z79.01]         Your next Anticoagulation Clinic appointment(s)     Jul 13, 2017  8:45 AM CDT   Anticoagulation Visit with  ANTICOAGULATION CLINIC   [facility] [place] ([facility])    [address] [place] [address]  [address]  [facility] [address] [phone]              Contact Numbers     [facility]  Please call  [phone] to cancel and/or reschedule your appointment   Please call  [phone] with any problems or questions regarding your therapy.        June 2017 Details    Sun Mon Tue Wed Thu Fri Sat         1               2               3                 4               5               6               7               8               9               10                 11               12               13               14               15      7.5 mg   See details      16      7.5 mg         17      7.5 mg           18      7.5 mg         19      5 mg         20      7.5 mg         21      7.5 mg         22      7.5 mg         23      7.5 mg         24      7.5 mg           25      7.5 mg         26      5 mg         27      7.5 mg         28      7.5 mg         29      7.5 mg         30      7.5 mg           Date Details   06/15 This INR check               How to take your warfarin dose     To take:  5 mg Take 1 of the 5 mg tablets.    To take:  7.5 mg Take 1.5 of the 5 mg tablets.           July 2017 Details    Sun Mon Tue Wed Thu Fri Sat           1       7.5 mg           2      7.5 mg         3      5 mg         4      7.5 mg         5      7.5 mg         6      7.5 mg         7      7.5 mg         8      7.5 mg           9      7.5 mg         10      5 mg         11      7.5 mg         12      7.5 mg         13            14               15                 16               17               18               19               20               21               22                 23               24               25               26               27               28               29                 30               31                     Date Details   No additional details    Date of next INR:  7/13/2017         How to take your warfarin dose     To take:  5 mg Take 1 of the 5 mg tablets.    To take:  7.5 mg Take 1.5 of the 5 mg tablets.

## 2017-06-15 NOTE — PROGRESS NOTES
ANTICOAGULATION FOLLOW-UP CLINIC VISIT    Patient Name:  Fausto Farr  Date:  6/15/2017  Contact Type:  Face to Face    SUBJECTIVE:     Patient Findings     Positives No Problem Findings           OBJECTIVE    INR Protime   Date Value Ref Range Status   06/15/2017 2.6 (A) 0.86 - 1.14 Final       ASSESSMENT / PLAN  INR assessment THER    Recheck INR In: 4 WEEKS    INR Location Clinic      Anticoagulation Summary as of 6/15/2017     INR goal 2.5-3.5   Today's INR 2.6   Maintenance plan 5 mg (5 mg x 1) on Mon; 7.5 mg (5 mg x 1.5) all other days   Full instructions 5 mg on Mon; 7.5 mg all other days   Weekly total 50 mg   Plan last modified Gayatri Parmar RN (3/10/2017)   Next INR check 7/13/2017   Priority INR   Target end date Indefinite    Indications   AF (atrial fibrillation) (H) [I48.91]  S/P mitral valve replacement [Z95.2]  Long-term (current) use of anticoagulants [Z79.01] [Z79.01]         Anticoagulation Episode Summary     INR check location [facility]    Preferred lab     Send INR reminders to [facility] INR/PROTIME    Comments       Anticoagulation Care Providers     Provider Role Specialty Phone number    Nii Nelson MD [facility] [phone]            See the Encounter Report to view Anticoagulation Flowsheet and Dosing Calendar (Go to Encounters tab in chart review, and find the Anticoagulation Therapy Visit)        Caryn Campos RN

## 2017-06-22 ENCOUNTER — HOSPITAL ENCOUNTER (OUTPATIENT)
Dept: CT IMAGING | Facility: CLINIC | Age: 77
Discharge: HOME | End: 2017-06-22
Attending: RADIOLOGY | Admitting: RADIOLOGY
Payer: MEDICARE

## 2017-06-22 DIAGNOSIS — I71.40 ABDOMINAL AORTIC ANEURYSM (H): ICD-10-CM

## 2017-06-22 LAB
CREAT BLD-MCNC: 1 MG/DL (ref 0.66–1.25)
GFR SERPL CREATININE-BSD FRML MDRD: 73 ML/MIN/1.7M2

## 2017-06-22 PROCEDURE — 25000128 H RX IP 250 OP 636: Performed by: RADIOLOGY

## 2017-06-22 PROCEDURE — 82565 ASSAY OF CREATININE: CPT

## 2017-06-22 PROCEDURE — 74174 CTA ABD&PLVS W/CONTRAST: CPT

## 2017-06-22 RX ORDER — IOPAMIDOL 755 MG/ML
500 INJECTION, SOLUTION INTRAVASCULAR ONCE
Status: COMPLETED | OUTPATIENT
Start: 2017-06-22 | End: 2017-06-22

## 2017-06-22 RX ADMIN — IOPAMIDOL 125 ML: 755 INJECTION, SOLUTION INTRAVENOUS at 10:09

## 2017-06-22 RX ADMIN — SODIUM CHLORIDE 80 ML: 9 INJECTION, SOLUTION INTRAVENOUS at 10:09

## 2017-06-26 ENCOUNTER — TELEPHONE (OUTPATIENT)
Dept: OTHER | Facility: CLINIC | Age: 77
End: 2017-06-26

## 2017-06-26 DIAGNOSIS — I71.40 ABDOMINAL AORTIC ANEURYSM (H): Primary | ICD-10-CM

## 2017-06-26 DIAGNOSIS — I73.9 CLAUDICATION OF BOTH LOWER EXTREMITIES (H): Primary | ICD-10-CM

## 2017-06-26 NOTE — TELEPHONE ENCOUNTER
"Called patient with results.  EVAR graft is patent and and aneurysm sac size is stable.  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  Pt does c/o of buttock claudication after 1 block.  He did consult with PMD Dr. Reyes, ultrasound was ordered.  However, pt states no one called.  It appears in EPIC that the ultrasound order may have been ordered incorrectly.  Contacted scheduling and according to them it did not show up in the \"queve\".  Will reorder us KEERTHI's with exercise.  Review and contact patient with results.  [facility] will call patient to schedule.  Recommend annual ultrasound follow up of EVAR and iliac aneurysm.    Yesy Rao RN  IR nurse clinician  [phone]  Date Exam Time Accession # Performing Department Results    6/22/17 10:10 AM [id number] [facility]    [employer] Interactive Report and InfoRx   View the interactive report   PACS Images   Show images for CT Abdomen/Pelvis Angio wo & w Contrast   Study Result   CTA ANGIOGRAM ABDOMEN/PELVIS June 22, 2017 10:10 AM      HISTORY: Status post endovascular aneurysm repair nine year followup  4/24/08 with Dr. Moreno/ [place]. Dr. Osborne to follow. Abdominal  aortic aneurysm, without rupture.     TECHNIQUE: CT of the abdomen and pelvis without and with 125mL  Isovue-370 IV. Radiation dose for this scan was reduced using  automated exposure control, adjustment of the mA and/or kV according  to patient size, or iterative reconstruction technique.      COMPARISON: 6/22/2016, 6/12/2013.     FINDINGS:   Lung bases: Lung bases are clear. No pleural effusion or pericardial  effusion.     Abdomen: Evaluation of solid organ parenchyma is limited secondary to  contrast bolus timing. The spleen, kidneys, adrenal glands, liver,  gallbladder and pancreas show no focal normality. No intrahepatic or  extra hepatic biliary dilatation. No intraperitoneal free air or free  fluid.     Small and large bowel are normal in caliber without " evidence of  obstruction. The appendix is normal. No abdominal or pelvic  lymphadenopathy. Prostate radiation seeds are seen in the prostate.     Postoperative changes of aortobiiliac endovascular aneurysm repair.  The stent graft is patent. The aneurysm sac measures 6.3 x 6.2 cm,  previously 6.3 x 6.4 cm. No evidence of endoleak. 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 Renal arteries are patent  bilaterally.     Again noted there is aneurysmal dilatation of the left internal iliac  artery measuring 1.8 cm, unchanged. Moderate stenosis of the right  external iliac artery origin is unchanged.     Bones: Mild compression of the superior endplate of the L4 vertebral  body is again noted. Postoperative changes of transpedicular L4-L5  fusion.         IMPRESSION:  1. Changes of endovascular aortobiiliac aneurysm repair. The stent  graft is patent without evidence of an endoleak. The aneurysm sac is  not significantly changed in size.  2. Stable left internal iliac artery aneurysm.     HERNÁN FORBES,

## 2017-06-30 ENCOUNTER — HOSPITAL ENCOUNTER (OUTPATIENT)
Dept: ULTRASOUND IMAGING | Facility: CLINIC | Age: 77
Discharge: HOME | End: 2017-06-30
Attending: RADIOLOGY | Admitting: RADIOLOGY
Payer: MEDICARE

## 2017-06-30 DIAGNOSIS — I73.9 CLAUDICATION OF BOTH LOWER EXTREMITIES (H): ICD-10-CM

## 2017-06-30 PROCEDURE — 93924 LWR XTR VASC STDY BILAT: CPT

## 2017-07-03 ENCOUNTER — OFFICE VISIT (OUTPATIENT)
Dept: OTHER | Facility: CLINIC | Age: 77
End: 2017-07-03
Attending: RADIOLOGY
Payer: MEDICARE

## 2017-07-03 VITALS — DIASTOLIC BLOOD PRESSURE: 69 MMHG | SYSTOLIC BLOOD PRESSURE: 121 MMHG | OXYGEN SATURATION: 96 % | HEART RATE: 82 BPM

## 2017-07-03 DIAGNOSIS — R09.89 CAROTID BRUIT: Primary | ICD-10-CM

## 2017-07-03 DIAGNOSIS — I73.9 CLAUDICATION OF BOTH LOWER EXTREMITIES (H): Primary | ICD-10-CM

## 2017-07-03 DIAGNOSIS — I70.213 ATHEROSCLEROSIS OF NATIVE ARTERY OF BOTH LOWER EXTREMITIES WITH INTERMITTENT CLAUDICATION (H): ICD-10-CM

## 2017-07-03 PROCEDURE — 99211 OFF/OP EST MAY X REQ PHY/QHP: CPT

## 2017-07-03 NOTE — MR AVS SNAPSHOT
After Visit Summary   7/3/2017    Fausto Farr    MRN: [id number]           Patient Information     Date Of Birth          1940        Visit Information        Provider Department      7/3/2017 2:30 PM Johnnie Osborne MD [facility]        Today's Diagnoses     Claudication of both lower extremities (H)    -  1      Care Instructions    Obtain carotid ultrasound for bruit.  Scheduled for 7/7 at [facility].  Will review with Dr. Osborne.  If normal, then will proceed with bilateral leg angiogram.  Pt will need a pre-op as well.          Follow-ups after your visit        Your next 10 appointments already scheduled     Jul 13, 2017  8:45 AM CDT   Anticoagulation Visit with  ANTICOAGULATION CLINIC   [facility]    [address]  [address]  [facility] [address] [phone]            Jul 27, 2017  9:00 AM CDT   Return Visit with Calderon Santo MD   [facility] AT [place] ([facility])    [address]  [facility] [address] [phone]              Who to contact     If you have questions or need follow up information about today's clinic visit or your schedule please contact [facility] directly at [phone].  Normal or non-critical lab and imaging results will be communicated to you by MyChart, letter or phone within 4 business days after the clinic has received the results. If you do not hear from us within 7 days, please contact the clinic through MyChart or phone. If you have a critical or abnormal lab result, we will notify you by phone as soon as possible.  Submit refill requests through [employer] or call your pharmacy and they will forward the refill request to us. Please allow 3 business days for your refill to be completed.          Additional Information About Your Visit        [facility] Information     [employer] lets you send  "messages to your doctor, view your test results, renew your prescriptions, schedule appointments and more. To sign up, go to [email or web address] . Click on \"Log in\" on the left side of the screen, which will take you to the Welcome page. Then click on \"Sign up Now\" on the right side of the page.     You will be asked to enter the access code listed below, as well as some personal information. Please follow the directions to create your username and password.     Your access code is: [email or web address]  Expires: 2017 10:08 AM     Your access code will  in 90 days. If you need help or a new code, please call your [place] clinic or [phone].        Care EveryWhere ID     This is your Care EveryWhere ID. This could be used by other organizations to access your [facility] medical records  [id number]        Your Vitals Were     Pulse Pulse Oximetry                82 96%           Blood Pressure from Last 3 Encounters:   17 121/69   17 132/70   17 112/60    Weight from Last 3 Encounters:   17 224 lb (101.6 kg)   17 225 lb (102.1 kg)   16 219 lb (99.3 kg)              Today, you had the following     No orders found for display       Primary Care Provider Office Phone # Fax #    Tu Reyes [phone]       [facility] [address] [place] [address]        Equal Access to Services     [facility]: Hadii aad ku hadasho Soomaali, waaxda luqadaha, qaybta kaalmada adeegyada, denny blake . So [facility] [phone].    ATENCIÓN: Si habla jesus albertoañol, tiene a brown disposición servicios gratuitos de asistencia lingüística. Llame al [phone].    We comply with applicable federal civil rights laws and [place] laws. We do not discriminate on the basis of race, color, national origin, age, disability sex, sexual orientation or gender identity.            Thank you!     Thank you for choosing [facility]  for your care. Our goal is always to provide you with excellent care. Hearing back from our patients is one way we can continue to improve our services. Please take a few minutes to complete the written survey that you may receive in the mail after your visit with us. Thank you!             Your Updated Medication List - Protect others around you: Learn how to safely use, store and throw away your medicines at www.disposemymeds.org.          This list is accurate as of: 7/3/17 11:59 PM.  Always use your most recent med list.                   Brand Name Dispense Instructions for use Diagnosis    ASPIRIN EC PO      Take 81 mg by mouth every evening        betamethasone valerate 0.1 % lotion    VALISONE    60 mL    APPLY TOPICALLY TWICE DAILY PRN    Eczema, unspecified type       calcium carb 1250 mg (500 mg [place])/vitamin D 200 units 500-200 MG-UNIT per tablet    OSCAL with D    90 tablet    Take 1 tablet by mouth 3 times daily (with meals)    S/P lumbar spinal fusion       erythromycin ophthalmic ointment    ROMYCIN     AURELIA A SMALL AMOUNT IN BOTH EYES ONCE IN THE EVENING FOR 30 DAYS        fluocinonide 0.05 % ointment    LIDEX    60 g    2- 30 gram tubes.  Apply twice a day as needed.    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)       metoprolol 25 MG tablet    LOPRESSOR     Take 1 tablet (25 mg) by mouth 2 times daily    Coronary artery disease involving coronary bypass graft 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       OMEGA-3 FISH OIL PO      Take 2 g by mouth 2 times daily (with meals)         rosuvastatin 40 MG tablet    CRESTOR    90 tablet    TAKE 1 TABLET BY MOUTH DAILY    Hyperlipidemia       tamsulosin 0.4 MG capsule    FLOMAX    90 capsule    TAKE 1 CAPSULE BY MOUTH EVERY DAY    Urinary retention       * warfarin 5 MG tablet    COUMADIN    135 tablet    TAKE 1 AND 1/2 TABLETS BY MOUTH DAILY OR AS DIRECTED    Long-term (current) use of anticoagulants, S/P aortic valve replacement       * warfarin 5 MG tablet    COUMADIN    135 tablet    TAKE 1 AND 1/2 TABLETS BY MOUTH DAILY OR AS DIRECTED    Long-term (current) use of anticoagulants, S/P aortic valve replacement       ZETIA 10 MG tablet   Generic drug:  ezetimibe     90 tablet    TAKE 1 TABLET BY MOUTH DAILY    Mixed hyperlipidemia       * Notice:  This list has 2 medication(s) that are the same as other medications prescribed for you. Read the directions carefully, and ask your doctor or other care provider to review them with you.

## 2017-07-03 NOTE — LETTER
[facility] [address]  [place] [address]  Phone: [phone]  Fax: [phone]      July 5, 2017    No referring provider defined for this encounter.    Regarding:  Name:  Fausto Farr  Address:  [facility] [facility] [address] [place] [address]  YOB: 1940    Dear ***,    No notes on file      Sincerely,    {[facility] PROVIDERS:[id number]}

## 2017-07-03 NOTE — LETTER
[facility] [address]  [place] [address]  Phone: [phone]  Fax: [phone]    See progress note from Dr. Osborne

## 2017-07-03 NOTE — LETTER
[facility] [address]  [place] [address]  Phone: [phone]  Fax: [phone]      July 3, 2017    No referring provider defined for this encounter.    Regarding:  Name: Fausto Farr  Address: [facility] [place] [address]  YOB: 1940    Dear ***,    I had the distinct pleasure of meeting with your patient, Fausto, in the [facility].  He has been undergoing

## 2017-07-03 NOTE — NURSING NOTE
"Chief Complaint   Patient presents with     RECHECK     bilateral leg claudication, recent CT and KEERTHI       Initial /69 (BP Location: Right arm, Patient Position: Chair, Cuff Size: Adult Large)  Pulse 82  SpO2 96% Estimated body mass index is 37.28 kg/(m^2) as calculated from the following:    Height as of 4/25/17: 5' 5\" (1.651 m).    Weight as of 4/25/17: 224 lb (101.6 kg).  Medication Reconciliation: complete     Face to face nursing time: 8 minutes    Staci Pacheco MA     "

## 2017-07-07 ENCOUNTER — HOSPITAL ENCOUNTER (OUTPATIENT)
Dept: ULTRASOUND IMAGING | Facility: CLINIC | Age: 77
Discharge: HOME | End: 2017-07-07
Attending: RADIOLOGY | Admitting: RADIOLOGY
Payer: MEDICARE

## 2017-07-07 DIAGNOSIS — R09.89 CAROTID BRUIT: ICD-10-CM

## 2017-07-07 PROCEDURE — 93880 EXTRACRANIAL BILAT STUDY: CPT

## 2017-07-07 NOTE — PROGRESS NOTES
Dear Dr. Reyes    I saw Fausto Farr at the [facility] today. He is a 77-year-old man who underwent endovascular repair of an infrarenal abdominal aortic aneurysm on 4/24/2008. Since that time, the patient has been having worsening bilateral lower extremity claudication symptoms. These are primarily in the eyes and hips. The symptoms arise when he is ambulating requiring him to rest before he can continue with ambulation. Noninvasive imaging studies were performed. Resting ABIs on the right and left were 1.6 and 0.9 respectively. A post exercise KEERTHI on the left was 1.20. A post exercise KEERTHI on the right could not be obtained due to vessel noncompressibility.    CT angiogram of the abdomen and pelvis shows his endograft to be widely patent without evidence for significant stenosis. The external iliac arteries have calcified plaque but appear to be patent without significant stenoses. There is some calcified plaque at the origins of the internal iliac arteries bilaterally. It is difficult to determine whether this contributes to significant stenoses. 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    On physical examination, femoral pulses are palpable. Foot pulses are dopplerable. There is a left carotid bruit. There are no wounds in the feet bilaterally.    IMPRESSION AND PLAN: Bilateral lower extremity claudication symptoms. These are primarily in the upper thighs and hips. Noninvasive imaging studies do not indicate a significant stenosis in the inflow arteries and KEERTHI measurements do not indicate significant lower extremity arterial insufficiency. Patient does have some calcified plaque at the origins of the internal iliac arteries. Patient may benefit from further interrogation of these arteries using angiography. This may require entering the left brachial artery due to presence of the endograft. Risks and benefits of  this were discussed with the patient. He is agreeable to proceeding.    Patient was also noted to have a bruit in the left neck. A carotid ultrasound would be performed to evaluate for significant stenosis in the internal carotid arteries.    Thank you for allowing me to participate in the management of this patient. Total time spent discussing cares was approximately 20 minutes.

## 2017-07-07 NOTE — PATIENT INSTRUCTIONS
Obtain carotid ultrasound for bruit.  Scheduled for 7/7 at [facility].  Will review with Dr. Osborne.  If normal, then will proceed with bilateral leg angiogram.  Pt will need a pre-op as well.

## 2017-07-10 ENCOUNTER — TELEPHONE (OUTPATIENT)
Dept: OTHER | Facility: CLINIC | Age: 77
End: 2017-07-10

## 2017-07-10 DIAGNOSIS — R09.89 CAROTID BRUIT PRESENT: Primary | ICD-10-CM

## 2017-07-10 NOTE — TELEPHONE ENCOUNTER
Called patient with results.  Recommend annual bilateral carotid ultrasound.  Pt would like to wait on scheduling the bilateral leg angiogram.  He has an appt with his cardiologist and would like to discuss with him.  He will contact me when he is ready to schedule.    Yesy Rao RN  IR nurse clinician  [phone]  Show images for US Carotid Bilateral   Study Result   BILATERAL CAROTID ULTRASOUND   7/7/2017 10:56 AM      HISTORY:  Left carotid bruit.     COMPARISON: Carotid ultrasound 6/14/2016.     RIGHT CAROTID FINDINGS:  There is calcified and noncalcified  atherosclerotic plaque in the common carotid artery as well as the  carotid bifurcation.   Right ICA PSV:  84  cm/sec.  Right ICA EDV:  34 cm/sec.  Right ICA/CCA PSV Ratio:  1.0.    These indicate less than 50% diameter stenosis of the right ICA.    Right Vertebral: Antegrade flow.   Right ECA: Antegrade flow.      LEFT CAROTID FINDINGS:  There is calcified and noncalcified  atherosclerotic plaque in the common carotid artery as well as the  carotid bifurcation.   Left ICA PSV:  69  cm/sec.  Left ICA EDV:  25 cm/sec.  Left ICA/CCA PSV Ratio:  0.9.    These indicate less than 50% diameter stenosis of the left ICA.    Left Vertebral: Antegrade flow.   Left ECA: Antegrade flow.      Causes of Decreased Accuracy:   None.          IMPRESSION:    1. Less than 50% diameter stenosis of the right ICA relative to the  distal ICA diameter.  2. Less than 50% diameter stenosis of the left ICA relative to the  distal ICA diameter.

## 2017-07-13 ENCOUNTER — ANTICOAGULATION THERAPY VISIT (OUTPATIENT)
Dept: NURSING | Facility: CLINIC | Age: 77
End: 2017-07-13
Payer: MEDICARE

## 2017-07-13 DIAGNOSIS — L30.9 ECZEMA, UNSPECIFIED TYPE: ICD-10-CM

## 2017-07-13 DIAGNOSIS — Z79.01 LONG-TERM (CURRENT) USE OF ANTICOAGULANTS: ICD-10-CM

## 2017-07-13 DIAGNOSIS — Z95.2 S/P MITRAL VALVE REPLACEMENT: ICD-10-CM

## 2017-07-13 DIAGNOSIS — I48.91 AF (ATRIAL FIBRILLATION) (H): ICD-10-CM

## 2017-07-13 LAB — INR POINT OF CARE: 2.4 (ref 0.86–1.14)

## 2017-07-13 PROCEDURE — 85610 PROTHROMBIN TIME: CPT | Mod: QW

## 2017-07-13 PROCEDURE — 36416 COLLJ CAPILLARY BLOOD SPEC: CPT

## 2017-07-13 PROCEDURE — 99207 ZZC NO CHARGE NURSE ONLY: CPT

## 2017-07-13 RX ORDER — FLUOCINONIDE 0.5 MG/G
OINTMENT TOPICAL
Qty: 60 G | Refills: 2 | Status: SHIPPED | OUTPATIENT
Start: 2017-07-13 | End: 2018-05-08

## 2017-07-13 NOTE — PROGRESS NOTES
ANTICOAGULATION FOLLOW-UP CLINIC VISIT    Patient Name:  Fausto Farr  Date:  7/13/2017  Contact Type:  Face to Face    SUBJECTIVE:     Patient Findings     Positives Change in diet/appetite    Comments He has a large garden (30' x 30') and has been eating a lot of broccoli and other green vegetables.           OBJECTIVE    INR Protime   Date Value Ref Range Status   07/13/2017 2.4 (A) 0.86 - 1.14 Final       ASSESSMENT / PLAN  INR assessment THER    Recheck INR In: 4 WEEKS    INR Location Clinic      Anticoagulation Summary as of 7/13/2017     INR goal 2.5-3.5   Today's INR 2.4!   Maintenance plan 7.5 mg (5 mg x 1.5) every day   Full instructions 7.5 mg every day   Weekly total 52.5 mg   Plan last modified Caryn Campos, RN (7/13/2017)   Next INR check 8/10/2017   Priority INR   Target end date Indefinite    Indications   AF (atrial fibrillation) (H) [I48.91]  S/P mitral valve replacement [Z95.2]  Long-term (current) use of anticoagulants [Z79.01] [Z79.01]         Anticoagulation Episode Summary     INR check location     Preferred lab     Send INR reminders to [facility]    Comments 5mg tabs // transfer from [facility] // adflyer      Anticoagulation Care Providers     Provider Role Specialty Phone number    Tu Reyes MD  Internal Medicine [phone]            See the Encounter Report to view Anticoagulation Flowsheet and Dosing Calendar (Go to Encounters tab in chart review, and find the Anticoagulation Therapy Visit)        Caryn Campos RN

## 2017-07-13 NOTE — TELEPHONE ENCOUNTER
fluocinonide  Last Written Prescription Date: 7/18/16 prescribed by another provider for eczema  Last Fill Quantity: 60 g,  # refills: 2   Last Office Visit with [facility] or [facility] prescribing provider: 4/25/17  physical                                       Next 5 appointments (look out 90 days)     Jul 27, 2017  9:00 AM CDT   Return Visit with Calderon Santo MD   [facility] ([facility])    [address]  [facility] [address] [phone]                Routing refill request to provider for review/approval because:  Prescribed by another provider  Claudia Day, RN  Message handled by Nurse Triage.

## 2017-07-13 NOTE — MR AVS SNAPSHOT
Fausto Farr   7/13/2017 8:45 AM   Anticoagulation Therapy Visit    Description:  77 year old male   Provider:  [facility]   Department:  [facility]           INR as of 7/13/2017     Today's INR 2.4!      Anticoagulation Summary as of 7/13/2017     INR goal 2.5-3.5   Today's INR 2.4!   Full instructions 7.5 mg every day   Next INR check 8/10/2017    Indications   AF (atrial fibrillation) (H) [I48.91]  S/P mitral valve replacement [Z95.2]  Long-term (current) use of anticoagulants [Z79.01] [Z79.01]         Your next Anticoagulation Clinic appointment(s)     Aug 10, 2017  8:45 AM CDT   Anticoagulation Visit with  ANTICOAGULATION CLINIC   [facility] ([facility])    [address]  [address]  [facility] [address] [phone]              Contact Numbers     [facility]  Please call  [phone] to cancel and/or reschedule your appointment   Please call  [phone] with any problems or questions regarding your therapy.        July 2017 Details    Sun Mon Tue Wed Thu Fri Sat           1                 2               3               4               5               6               7               8                 9               10               11               12               13      7.5 mg   See details      14      7.5 mg         15      7.5 mg           16      7.5 mg         17      7.5 mg         18      7.5 mg         19      7.5 mg         20      7.5 mg         21      7.5 mg         22      7.5 mg           23      7.5 mg         24      7.5 mg         25      7.5 mg         26      7.5 mg         27      7.5 mg         28      7.5 mg         29      7.5 mg           30      7.5 mg         31      7.5 mg               Date Details   07/13 This INR check               How to take your warfarin dose     To take:  7.5 mg Take 1.5 of the 5 mg tablets.           August 2017 Details    Sun Mon Tue Wed Thu Fri Sat       1      7.5 mg         2       7.5 mg         3      7.5 mg         4      7.5 mg         5      7.5 mg           6      7.5 mg         7      7.5 mg         8      7.5 mg         9      7.5 mg         10            11               12                 13               14               15               16               17               18               19                 20               21               22               23               24               25               26                 27               28               29               30               31                  Date Details   No additional details    Date of next INR:  8/10/2017         How to take your warfarin dose     To take:  7.5 mg Take 1.5 of the 5 mg tablets.

## 2017-07-14 ENCOUNTER — TELEPHONE (OUTPATIENT)
Dept: PEDIATRICS | Facility: CLINIC | Age: 77
End: 2017-07-14

## 2017-07-14 NOTE — TELEPHONE ENCOUNTER
PA requested for fluocinonide. Submitted via covermymeds. Await decision.    Fausto Farr ([place]: [id number]) - JLT  Fluocinonide 0.05% ointment  Status: Sent to Plan  Created: July 14th, 2017  Sent: July 14th, 2017      Nahomy Song MA

## 2017-07-17 DIAGNOSIS — I25.810 CORONARY ARTERY DISEASE INVOLVING CORONARY BYPASS GRAFT OF NATIVE HEART WITHOUT ANGINA PECTORIS: ICD-10-CM

## 2017-07-17 RX ORDER — METOPROLOL TARTRATE 25 MG/1
25 TABLET, FILM COATED ORAL 2 TIMES DAILY
Qty: 60 TABLET | Refills: 0 | Status: SHIPPED | OUTPATIENT
Start: 2017-07-17 | End: 2017-07-27

## 2017-07-20 ENCOUNTER — TELEPHONE (OUTPATIENT)
Dept: FAMILY MEDICINE | Facility: CLINIC | Age: 77
End: 2017-07-20

## 2017-07-20 NOTE — TELEPHONE ENCOUNTER
Prior Authorization Request    1. Prior Authorization for the medication ZETIA 10 MG tablet        Requesting Provider: Tu Reyes          Pt name: Fausto Farr        Pt : 1940        Pt MRN: [id number]        Last Office Visit: 2017           Insurance: Payor: MEDICARE / Plan: MEDICARE / Product Type: Medicare /         Insurance ID Number: [id number] [unfilled]        Prior Auth Contact Phone number: [phone]     BIN#:   PCN#:         To be completed by provider:     2.   Refuse or Start Prior Auth:  Please start Prior Auth.      If requesting prior auth initiation:       Diagnosis (with code): Mixed hyperlipidemia (E78.2); Coronary Artery disease (I25.10)      Patient has been on this medication since: before       Prior Medications, dates used, reason for failure:     Rosuvastatin 40 mg ,   Daily since before ,  Not effective enough to goal    Omega 3 fatty acid tabs,   2 gr BID since before ,  Not effective enough to goal      Reasons why other medications are not adequate substitutions:    Pt has been in good control with the addition of Zetia.  No side effects

## 2017-07-26 NOTE — TELEPHONE ENCOUNTER
Tried to start PA on CMM but the site is having issues sending to the plan. I called and asked [facility] to send a form to us to start PA. However, they told me that patient did  the generic (ezetimibe)  I called the patient to find out if he is okay taking the generic instead of the brand name. Waiting on a call back. PA not necessary at this time.

## 2017-07-27 ENCOUNTER — OFFICE VISIT (OUTPATIENT)
Dept: CARDIOLOGY | Facility: CLINIC | Age: 77
End: 2017-07-27
Attending: INTERNAL MEDICINE
Payer: MEDICARE

## 2017-07-27 VITALS
HEIGHT: 65 IN | OXYGEN SATURATION: 92 % | WEIGHT: 221 LBS | BODY MASS INDEX: 36.82 KG/M2 | DIASTOLIC BLOOD PRESSURE: 60 MMHG | HEART RATE: 76 BPM | SYSTOLIC BLOOD PRESSURE: 90 MMHG

## 2017-07-27 DIAGNOSIS — I25.810 CORONARY ARTERY DISEASE INVOLVING CORONARY BYPASS GRAFT OF NATIVE HEART WITHOUT ANGINA PECTORIS: ICD-10-CM

## 2017-07-27 DIAGNOSIS — I51.9 DISORDER OF RIGHT VENTRICLE OF HEART: Primary | ICD-10-CM

## 2017-07-27 DIAGNOSIS — R94.31 ABNORMAL ELECTROCARDIOGRAM: ICD-10-CM

## 2017-07-27 DIAGNOSIS — I35.9 AORTIC VALVE DEFECT: ICD-10-CM

## 2017-07-27 DIAGNOSIS — I05.9 MITRAL VALVE DISORDERS(424.0): ICD-10-CM

## 2017-07-27 PROCEDURE — 99215 OFFICE O/P EST HI 40 MIN: CPT | Performed by: INTERNAL MEDICINE

## 2017-07-27 RX ORDER — METOPROLOL TARTRATE 25 MG/1
25 TABLET, FILM COATED ORAL 2 TIMES DAILY
Qty: 180 TABLET | Refills: 3 | Status: SHIPPED | OUTPATIENT
Start: 2017-07-27 | End: 2018-05-08

## 2017-07-27 NOTE — MR AVS SNAPSHOT
After Visit Summary   7/27/2017    Fausto Farr    MRN: [id number]           Patient Information     Date Of Birth          1940        Visit Information        Provider Department      7/27/2017 9:00 AM Calderon Santo MD [facility] [place]        Today's Diagnoses     Disorder of right ventricle of heart    -  1    Mitral valve disorder        Aortic valve defect        Abnormal electrocardiogram        Coronary artery disease involving coronary bypass graft 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           Follow-ups after your visit        Additional Services     Follow-Up with Cardiologist       Or any NP                  Your next 10 appointments already scheduled     Aug 10, 2017  8:45 AM CDT   Anticoagulation Visit with  ANTICOAGULATION CLINIC   [facility]    [address]  [address]  [facility] [place] [facility] [address] [phone]            Aug 11, 2017  8:30 AM CDT   Ech Complete with [address] ([facility])    [address]  [facility] [place] [facility] [address] [phone]           1. Please bring or wear a comfortable two-piece outfit. 2. You may eat, drink and take your normal medicines. 3. For any questions that cannot be answered, please contact the ordering physician ***Please check-in at the [facility] Registration Office located in [address] in the [facility] building. When you are finished registering, please go to [address] and have a seat. The technician will call your name for the test.            Aug 15, 2017  3:50 PM CDT   Return Visit with ADELE Maynard CNP   [facility] [place] ([facility])    [address]  [facility] [address] [phone]              Future tests that were ordered for you today     Open Future Orders        Priority Expected  "Expires Ordered    EKG 12-lead complete w/read - Clinics (to be scheduled) Routine 8/10/2017 2018 2017    Echocardiogram Routine 8/10/2017 2018 2017    Follow-Up with Cardiologist Routine 8/10/2017 2018 2017            Who to contact     If you have questions or need follow up information about today's clinic visit or your schedule please contact [facility] [place] directly at [phone].  Normal or non-critical lab and imaging results will be communicated to you by Satomihart, letter or phone within 4 business days after the clinic has received the results. If you do not hear from us within 7 days, please contact the clinic through Satomihart or phone. If you have a critical or abnormal lab result, we will notify you by phone as soon as possible.  Submit refill requests through Textronics or call your pharmacy and they will forward the refill request to us. Please allow 3 business days for your refill to be completed.          Additional Information About Your Visit        SatomiharBorro Information     Textronics lets you send messages to your doctor, view your test results, renew your prescriptions, schedule appointments and more. To sign up, go to [email or web address] . Click on \"Log in\" on the left side of the screen, which will take you to the Welcome page. Then click on \"Sign up Now\" on the right side of the page.     You will be asked to enter the access code listed below, as well as some personal information. Please follow the directions to create your username and password.     Your access code is: [email or web address]  Expires: 10/25/2017 10:33 AM     Your access code will  in 90 days. If you need help or a new code, please call your [facility] clinic or [phone].        Care EveryWhere ID     This is your Care EveryWhere ID. This could be used by other organizations to access your [facility] medical records  [id number]        Your Vitals Were     Pulse " "Height Pulse Oximetry BMI (Body Mass Index)          76 1.651 m (5' 5\") 92% 36.78 kg/m2         Blood Pressure from Last 3 Encounters:   07/27/17 90/60   07/03/17 121/69   04/25/17 132/70    Weight from Last 3 Encounters:   07/27/17 100.2 kg (221 lb)   04/25/17 101.6 kg (224 lb)   04/14/17 102.1 kg (225 lb)              We Performed the Following     Follow-Up with Cardiologist          Today's Medication Changes          These changes are accurate as of: 7/27/17 10:40 AM.  If you have any questions, ask your nurse or doctor.               Stop taking these medicines if you haven't already. Please contact your care team if you have questions.     lisinopril 2.5 MG tablet   Commonly known as:  PRINIVIL/Zestril   Stopped by:  Calderon Santo MD                Where to get your medicines      These medications were sent to [facility] [address]  [place], [place] - [address]  AT [facility]  [address] [place] [address]     Phone:  [phone]     metoprolol 25 MG tablet                Primary Care Provider Office Phone # Fax #    Tu Nicholas Reyes [phone]       [facility] [address] [place] [address]        Equal Access to Services     [facility] AH: Hadii ted ku hadasho Sobridger, waaxda luqadaha, qaybta kaalmada sujey, denny goldberg. So [facility] [phone].    ATENCIÓN: Si habla español, tiene a brown disposición servicios gratuitos de asistencia lingüística. Rebecca zurita [phone].    We comply with applicable federal civil rights laws and [place] laws. We do not discriminate on the basis of race, color, national origin, age, disability sex, sexual orientation or gender identity.            Thank you!     Thank you for choosing [facility] [place]  for your care. Our goal is always to provide you with excellent care. Hearing back from our patients is one way we can " continue to improve our services. Please take a few minutes to complete the written survey that you may receive in the mail after your visit with us. Thank you!             Your Updated Medication List - Protect others around you: Learn how to safely use, store and throw away your medicines at www.disposemymeds.org.          This list is accurate as of: 7/27/17 10:40 AM.  Always use your most recent med list.                   Brand Name Dispense Instructions for use Diagnosis    ASPIRIN EC PO      Take 81 mg by mouth every evening        betamethasone valerate 0.1 % lotion    VALISONE    60 mL    APPLY TOPICALLY TWICE DAILY PRN    Eczema, unspecified type       fluocinonide 0.05 % ointment    LIDEX    60 g    2- 30 gram tubes.  Apply twice a day as needed.    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)       metoprolol 25 MG tablet    LOPRESSOR    180 tablet    Take 1 tablet (25 mg) by mouth 2 times daily    Coronary artery disease involving coronary bypass graft 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       OMEGA-3 FISH OIL PO      Take 2 g by mouth 2 times daily (with meals)        rosuvastatin 40 MG tablet    CRESTOR    90 tablet    TAKE 1 TABLET BY MOUTH DAILY    Hyperlipidemia       tamsulosin 0.4 MG capsule    FLOMAX    90 capsule    TAKE 1 CAPSULE BY MOUTH EVERY DAY    Urinary retention       warfarin 5 MG tablet    COUMADIN    135 tablet    TAKE 1 AND 1/2 TABLETS BY MOUTH DAILY OR AS DIRECTED    Long-term (current) use of anticoagulants, S/P aortic valve replacement       ZETIA 10 MG tablet   Generic drug:  ezetimibe     90 tablet    TAKE 1 TABLET BY MOUTH DAILY    Mixed hyperlipidemia

## 2017-07-27 NOTE — PROGRESS NOTES
HPI and Plan:   See dictation    Orders Placed This Encounter   Procedures     Follow-Up with Cardiologist     EKG 12-lead complete w/read - Clinics (to be scheduled)     Echocardiogram     Orders Placed This Encounter   Medications     metoprolol (LOPRESSOR) 25 MG tablet     Sig: Take 1 tablet (25 mg) by mouth 2 times daily     Dispense:  180 tablet     Refill:  3     Medications Discontinued During This Encounter   Medication Reason     lisinopril (PRINIVIL,ZESTRIL) 2.5 MG tablet      metoprolol (LOPRESSOR) 25 MG tablet Reorder         Encounter Diagnoses   Name Primary?     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  Apply twice a day as needed. 60 g 2     warfarin (COUMADIN) 5 MG tablet TAKE 1 AND 1/2 TABLETS BY MOUTH DAILY OR AS DIRECTED 135 tablet 1     rosuvastatin (CRESTOR) 40 MG tablet TAKE 1 TABLET BY MOUTH DAILY 90 tablet 2     mesalamine (ASACOL HD) 800 MG EC tablet Take 1 tablet (800 mg) by mouth 2 times daily 180 tablet 3     ZETIA 10 MG tablet TAKE 1 TABLET BY MOUTH DAILY 90 tablet 2     furosemide (LASIX) 40 MG tablet Take 0.5 tablets (20 mg) by mouth daily 45 tablet 3     tamsulosin (FLOMAX) 0.4 MG 24 hr capsule TAKE 1 CAPSULE BY MOUTH EVERY DAY 90 capsule 3     betamethasone valerate (VALISONE) 0.1 % lotion APPLY TOPICALLY TWICE DAILY PRN 60 mL 3     ASPIRIN EC PO Take 81 mg by mouth every evening       Omega-3 Fatty Acids (OMEGA-3 FISH OIL PO) Take 2 g by mouth 2 times daily (with meals)        [DISCONTINUED] metoprolol (LOPRESSOR) 25 MG tablet Take 1 tablet (25 mg) by  mouth 2 times daily 60 tablet 0       ALLERGIES     Allergies   Allergen Reactions     Bees Anaphylaxis       PAST MEDICAL HISTORY:  Past Medical History:   Diagnosis Date     Abdominal pain      Abnormal ECG     RBBB, 1st degree AVB, Left axis deviation     Anemia     currently taking iron     Arrhythmia     pac, pvc     Back pain     since 1980     Bruit      CAD (coronary artery disease)      Cellulitis 10/18/12     Contact dermatitis and other eczema, due to unspecified cause      Diaphragmatic hernia without mention of obstruction or gangrene      Gastric ulcer      Glucose intolerance (impaired glucose tolerance)      Heart murmur 9/16/13    valvular heart disease     Hyperlipidaemia      Hypertension 8/6/13     Lumbago      Malaise and fatigue      Mobitz (type) I (Wenckebach's) atrioventricular block     and RBBB     Nocturia 10/18/12     Nocturia      Nonallopathic lesion of cervical region      Nonallopathic lesion of lumbar region      Nonallopathic lesion of pelvic region, not elsewhere classified      Nonallopathic lesion of rib cage      Nonallopathic lesion of sacral region      Paroxysmal atrial fibrillation (H) 10/18/12     Prostate cancer (H) 2008    radiation seed, XRT      PVD (peripheral vascular disease) (H)      Rotator cuff strain     and sprain     S/P aortic valve replacement 2006    developed perivalve leak and MS, therefore redo surg 2013     S/P CABG (coronary artery bypass graft) 2006    Lima-Lad, RA-Rca     Sciatica of left side     since 2000     Sleep apnea     pt declined cpap     Ulcerative colitis (H)      Vitamin D deficiency        PAST SURGICAL HISTORY:  Past Surgical History:   Procedure Laterality Date     AORTIC VALVE REPLACEMENT  1/3/06    redo AVR SJM 21mm and SJM MVR 27mm in 2013SJM 21([id number]):AVR, SJM 27 :MVR-     ARTHROPLASTY KNEE      right knee     BACK SURGERY  Oct 2015    Fusion L4-5, laminectomy L2, L3     BYPASS GRAFT ARTERY CORONARY  10/2013     reimplantation of radial artery graft to RCA     C CABG, VEIN, TWO  1/3/06    Left radial to RCA, LIMA to LAD (RA to RCA reimplanted at time of 2013 surg)     CARDIAC CATHERIZATION  11/2005    Stent placed to RCA     CARDIAC CATHERIZATION  04/2013    Occluded RCA, patent LIMA to LAD and radial graft to PDA     CARPAL TUNNEL RELEASE RT/LT  1994     COLONOSCOPY  8-22-11     CYSTOSCOPY FLEXIBLE  10/16/2013    Procedure: CYSTOSCOPY FLEXIBLE;  FLEXIBLE CYSTOSCOPY / DILATION OF URETHRA / INSERTION OF LESLIE;  Surgeon: Cooper Wallace MD;  Location: [facility] OR     ENDOVASCULAR REPAIR ANEURYSM ABDOMINAL AORTA  2006     ENDOVASCULAR REPAIR, INFRARENAL ABDOMINAL AORTIC ANEURYSM/DISSECTION; MODULAR BIFURCATED PROSTHESIS      AAA repair endovascular     ENT SURGERY       GENITOURINARY SURGERY  6/16/08    radioactive seeding     HEAD & NECK SURGERY  1997    vocal cord polypectomy     KNEE SURGERY  2001 Right knee arthroscopy     OPTICAL TRACKING SYSTEM FUSION SPINE POSTERIOR LUMBAR THREE+ LEVELS N/A 10/29/2015    Procedure: OPTICAL TRACKING SYSTEM FUSION SPINE POSTERIOR LUMBAR THREE+ LEVELS;  Surgeon: Walt Garcia MD;  Location:  OR     PROSTATE SURGERY  06/16/2008 Radioactive seeding     PROSTATE SURGERY      radioactive seeding 6/16/08     REPAIR ANEURYSM ABDOMINAL AORTA  06/08     REPAIR VALVE MITRAL  10/16/2013    SJM 21([id number]):AVR, SJM 27 :MVRProcedure: REPAIR VALVE MITRAL;  REDO STERNOTOMY/REDO AORTIC VALVE REPLACEMENT/ MITRAL VALVE REPLACEMENT/REIMPLANTATION OF RIGHT CORONARY ARTERY BYPASS WITH RACHAEL ( ON PUMP);  Surgeon: Viet Singh MD;  Location:  OR     REPLACE VALVE AORTIC  10/16/2013    Procedure: REPLACE VALVE AORTIC;;  Surgeon: Viet Singh MD;  Location:  OR     SURGERY GENERAL IP CONSULT  05/2008 Excision aneurysm abdominal aorta     SURGERY GENERAL IP CONSULT  1997 Vocal cord polypectomy     VASCULAR SURGERY  1968, 1993     varicose vein stripping       FAMILY  "HISTORY:  Family History   Problem Relation Age of Onset     Coronary Artery Disease Father      CABG     HEART DISEASE Father      Pacemaker     HEART DISEASE Brother        SOCIAL HISTORY:  Social History     Social History     Marital status:      Spouse name: N/A     Number of children: N/A     Years of education: N/A     Social History Main Topics     Smoking status: Former Smoker     Packs/day: 1.00     Years: 40.00     Quit date: 10/23/2002     Smokeless tobacco: Never Used     Alcohol use Yes      Comment: a couple beers per week     Drug use: No     Sexual activity: No     Other Topics Concern     Parent/Sibling W/ Cabg, Mi Or Angioplasty Before 65f 55m? Yes     Brother had bypass at 55     Caffeine Concern No     6-8 cups of half and half per day     Special Diet No     Exercise No     Social History Narrative       Review of Systems:  Skin:  Negative     Eyes:  Positive for glasses  ENT:  Positive for hearing loss  Respiratory:  Positive for dyspnea on exertion  Cardiovascular:    lightheadedness;Positive for  Gastroenterology: Positive for reflux  Genitourinary:  Negative    Musculoskeletal:  Positive for arthritis;joint pain  Neurologic:  Positive for headaches  Psychiatric:  Positive for excessive stress  Heme/Lymph/Imm:  Negative    Endocrine:  Negative      Physical Exam:  Vitals: BP 90/60 (BP Location: Right arm, Patient Position: Sitting, Cuff Size: Adult Regular)  Pulse 76  Ht 1.651 m (5' 5\")  Wt 100.2 kg (221 lb)  SpO2 92%  BMI 36.78 kg/m2    Constitutional:  cooperative, alert and oriented, well developed, well nourished, in no acute distress        Skin:  warm and dry to the touch, no apparent skin lesions or masses noted        Head:  normocephalic, no masses or lesions        Eyes:  pupils equal and round, conjunctivae and lids unremarkable, sclera white, no xanthalasma, EOMS intact, no nystagmus        ENT:  no pallor or cyanosis, dentition good        Neck:  carotid pulses are " full and equal bilaterally, JVP normal, no carotid bruit, no thyromegaly        Chest:  normal breath sounds, clear to auscultation, normal A-P diameter, normal symmetry, normal respiratory excursion, no use of accessory muscles     well healed sternotomy    Cardiac: regular rhythm frequent premature beats   crisp prosthetic valve sounds early systolic murmur;grade 1          Abdomen:  abdomen soft, non-tender, BS normoactive, no mass, no HSM, no bruits obese      Vascular:                                          Extremities and Back:      trace;bilateral LE edema;L greater than R     marked varicose veins L>R, prior vein strip    Neurological:  affect appropriate, oriented to time, person and place          Recent Lab Results:  LIPID RESULTS:  Lab Results   Component Value Date    CHOL 134 04/25/2017    HDL 36 (L) 04/25/2017    LDL 57 04/25/2017    TRIG 206 (H) 04/25/2017    CHOLHDLRATIO 3.6 06/03/2015       LIVER ENZYME RESULTS:  Lab Results   Component Value Date    AST 25 04/25/2017    ALT 27 04/25/2017       CBC RESULTS:  Lab Results   Component Value Date    WBC 10.6 04/25/2017    RBC 4.33 (L) 04/25/2017    HGB 12.9 (L) 04/25/2017    HCT 39.2 (L) 04/25/2017    MCV 91 04/25/2017    MCH 29.8 04/25/2017    MCHC 32.9 04/25/2017    RDW 12.7 04/25/2017     04/25/2017       BMP RESULTS:  Lab Results   Component Value Date     04/25/2017    POTASSIUM 4.5 04/25/2017    CHLORIDE 104 04/25/2017    CO2 23 04/25/2017    ANIONGAP 12 04/25/2017    GLC 96 04/25/2017    BUN 19 04/25/2017    CR 1.00 04/25/2017    GFRESTIMATED 73 06/22/2017    GFRESTBLACK 88 06/22/2017    AWILDA 9.1 04/25/2017        A1C RESULTS:  Lab Results   Component Value Date    A1C 5.9 04/25/2017       INR RESULTS:  Lab Results   Component Value Date    INR 2.4 (A) 07/13/2017    INR 2.6 (A) 06/15/2017    INR 2.36 (H) 11/07/2015    INR 2.05 (H) 11/06/2015           CC  Calderon Santo MD   [facility]  [id number]  [place]  [id number]

## 2017-07-27 NOTE — LETTER
7/27/2017    Tu Reyes MD  [address] [place] [address]    RE: Fausto Farr       Dear Colleague,    I had the pleasure of seeing Fausto Farr in the [facility].    HPI and Plan:   See dictation    Orders Placed This Encounter   Procedures     Follow-Up with Cardiologist     EKG 12-lead complete w/read - Clinics (to be scheduled)     Echocardiogram     Orders Placed This Encounter   Medications     metoprolol (LOPRESSOR) 25 MG tablet     Sig: Take 1 tablet (25 mg) by mouth 2 times daily     Dispense:  180 tablet     Refill:  3     Medications Discontinued During This Encounter   Medication Reason     lisinopril (PRINIVIL,ZESTRIL) 2.5 MG tablet      metoprolol (LOPRESSOR) 25 MG tablet Reorder         Encounter Diagnoses   Name Primary?     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  Apply twice a day as needed. 60 g 2     warfarin (COUMADIN) 5 MG tablet TAKE 1 AND 1/2 TABLETS BY MOUTH DAILY OR AS DIRECTED 135 tablet 1     rosuvastatin (CRESTOR) 40 MG tablet TAKE 1 TABLET BY MOUTH DAILY 90 tablet 2     mesalamine (ASACOL HD) 800 MG EC tablet Take 1 tablet (800 mg) by mouth 2 times daily 180 tablet 3     ZETIA 10 MG tablet TAKE 1 TABLET BY MOUTH DAILY 90 tablet 2     furosemide (LASIX) 40 MG tablet Take 0.5 tablets (20 mg) by mouth daily 45 tablet 3     tamsulosin (FLOMAX) 0.4 MG 24 hr capsule TAKE 1 CAPSULE BY MOUTH EVERY DAY 90 capsule 3     betamethasone valerate (VALISONE) 0.1 % lotion APPLY TOPICALLY TWICE DAILY PRN 60 mL 3      ASPIRIN EC PO Take 81 mg by mouth every evening       Omega-3 Fatty Acids (OMEGA-3 FISH OIL PO) Take 2 g by mouth 2 times daily (with meals)        [DISCONTINUED] metoprolol (LOPRESSOR) 25 MG tablet Take 1 tablet (25 mg) by mouth 2 times daily 60 tablet 0       ALLERGIES     Allergies   Allergen Reactions     Bees Anaphylaxis       PAST MEDICAL HISTORY:  Past Medical History:   Diagnosis Date     Abdominal pain      Abnormal ECG     RBBB, 1st degree AVB, Left axis deviation     Anemia     currently taking iron     Arrhythmia     pac, pvc     Back pain     since 1980     Bruit      CAD (coronary artery disease)      Cellulitis 10/18/12     Contact dermatitis and other eczema, due to unspecified cause      Diaphragmatic hernia without mention of obstruction or gangrene      Gastric ulcer      Glucose intolerance (impaired glucose tolerance)      Heart murmur 9/16/13    valvular heart disease     Hyperlipidaemia      Hypertension 8/6/13     Lumbago      Malaise and fatigue      Mobitz (type) I (Wenckebach's) atrioventricular block     and RBBB     Nocturia 10/18/12     Nocturia      Nonallopathic lesion of cervical region      Nonallopathic lesion of lumbar region      Nonallopathic lesion of pelvic region, not elsewhere classified      Nonallopathic lesion of rib cage      Nonallopathic lesion of sacral region      Paroxysmal atrial fibrillation (H) 10/18/12     Prostate cancer (H) 2008    radiation seed, XRT      PVD (peripheral vascular disease) (H)      Rotator cuff strain     and sprain     S/P aortic valve replacement 2006    developed perivalve leak and MS, therefore redo surg 2013     S/P CABG (coronary artery bypass graft) 2006    Lima-Lad, RA-Rca     Sciatica of left side     since 2000     Sleep apnea     pt declined cpap     Ulcerative colitis (H)      Vitamin D deficiency        PAST SURGICAL HISTORY:  Past Surgical History:   Procedure Laterality Date     AORTIC VALVE REPLACEMENT  1/3/06    redo AVR  SJM 21mm and [facility] MVR 27mm in 2013SJ 21([id number]):AVR, [facility] 27 :MVR-     ARTHROPLASTY KNEE      right knee     BACK SURGERY  Oct 2015    Fusion L4-5, laminectomy L2, L3     BYPASS GRAFT ARTERY CORONARY  10/2013    reimplantation of radial artery graft to RCA     C CABG, VEIN, TWO  1/3/06    Left radial to RCA, LIMA to LAD (RA to RCA reimplanted at time of 2013 surg)     CARDIAC CATHERIZATION  11/2005    Stent placed to RCA     CARDIAC CATHERIZATION  04/2013    Occluded RCA, patent LIMA to LAD and radial graft to PDA     CARPAL TUNNEL RELEASE RT/LT  1994     COLONOSCOPY  8-22-11     CYSTOSCOPY FLEXIBLE  10/16/2013    Procedure: CYSTOSCOPY FLEXIBLE;  FLEXIBLE CYSTOSCOPY / DILATION OF URETHRA / INSERTION OF LESLIE;  Surgeon: Cooper Wallace MD;  Location:  OR     ENDOVASCULAR REPAIR ANEURYSM ABDOMINAL AORTA  2006     ENDOVASCULAR REPAIR, INFRARENAL ABDOMINAL AORTIC ANEURYSM/DISSECTION; MODULAR BIFURCATED PROSTHESIS      AAA repair endovascular     ENT SURGERY       GENITOURINARY SURGERY  6/16/08    radioactive seeding     HEAD & NECK SURGERY  1997    vocal cord polypectomy     KNEE SURGERY  2001 Right knee arthroscopy     OPTICAL TRACKING SYSTEM FUSION SPINE POSTERIOR LUMBAR THREE+ LEVELS N/A 10/29/2015    Procedure: OPTICAL TRACKING SYSTEM FUSION SPINE POSTERIOR LUMBAR THREE+ LEVELS;  Surgeon: Walt Garcia MD;  Location:  OR     PROSTATE SURGERY  06/16/2008 Radioactive seeding     PROSTATE SURGERY      radioactive seeding 6/16/08     REPAIR ANEURYSM ABDOMINAL AORTA  06/08     REPAIR VALVE MITRAL  10/16/2013    [facility] 21([id number]):AVR, [facility] 27 :MVRProcedure: REPAIR VALVE MITRAL;  REDO STERNOTOMY/REDO AORTIC VALVE REPLACEMENT/ MITRAL VALVE REPLACEMENT/REIMPLANTATION OF RIGHT CORONARY ARTERY BYPASS WITH RACHAEL ( ON PUMP);  Surgeon: Viet Singh MD;  Location:  OR     REPLACE VALVE AORTIC  10/16/2013    Procedure: REPLACE VALVE AORTIC;;  Surgeon: Viet Singh MD;  Location:  OR      "SURGERY GENERAL IP CONSULT  05/2008 Excision aneurysm abdominal aorta     SURGERY GENERAL IP CONSULT  1997 Vocal cord polypectomy     VASCULAR SURGERY  1968, 1993     varicose vein stripping       FAMILY HISTORY:  Family History   Problem Relation Age of Onset     Coronary Artery Disease Father      CABG     HEART DISEASE Father      Pacemaker     HEART DISEASE Brother        SOCIAL HISTORY:  Social History     Social History     Marital status:      Spouse name: N/A     Number of children: N/A     Years of education: N/A     Social History Main Topics     Smoking status: Former Smoker     Packs/day: 1.00     Years: 40.00     Quit date: 10/23/2002     Smokeless tobacco: Never Used     Alcohol use Yes      Comment: a couple beers per week     Drug use: No     Sexual activity: No     Other Topics Concern     Parent/Sibling W/ Cabg, Mi Or Angioplasty Before 65f 55m? Yes     Brother had bypass at 55     Caffeine Concern No     6-8 cups of half and half per day     Special Diet No     Exercise No     Social History Narrative       Review of Systems:  Skin:  Negative     Eyes:  Positive for glasses  ENT:  Positive for hearing loss  Respiratory:  Positive for dyspnea on exertion  Cardiovascular:    lightheadedness;Positive for  Gastroenterology: Positive for reflux  Genitourinary:  Negative    Musculoskeletal:  Positive for arthritis;joint pain  Neurologic:  Positive for headaches  Psychiatric:  Positive for excessive stress  Heme/Lymph/Imm:  Negative    Endocrine:  Negative      Physical Exam:  Vitals: BP 90/60 (BP Location: Right arm, Patient Position: Sitting, Cuff Size: Adult Regular)  Pulse 76  Ht 1.651 m (5' 5\")  Wt 100.2 kg (221 lb)  SpO2 92%  BMI 36.78 kg/m2    Constitutional:  cooperative, alert and oriented, well developed, well nourished, in no acute distress        Skin:  warm and dry to the touch, no apparent skin lesions or masses noted        Head:  normocephalic, no masses or lesions    "     Eyes:  pupils equal and round, conjunctivae and lids unremarkable, sclera white, no xanthalasma, EOMS intact, no nystagmus        ENT:  no pallor or cyanosis, dentition good        Neck:  carotid pulses are full and equal bilaterally, JVP normal, no carotid bruit, no thyromegaly        Chest:  normal breath sounds, clear to auscultation, normal A-P diameter, normal symmetry, normal respiratory excursion, no use of accessory muscles     well healed sternotomy    Cardiac: regular rhythm frequent premature beats   crisp prosthetic valve sounds early systolic murmur;grade 1          Abdomen:  abdomen soft, non-tender, BS normoactive, no mass, no HSM, no bruits obese      Vascular:                                          Extremities and Back:      trace;bilateral LE edema;L greater than R     marked varicose veins L>R, prior vein strip    Neurological:  affect appropriate, oriented to time, person and place          Recent Lab Results:  LIPID RESULTS:  Lab Results   Component Value Date    CHOL 134 04/25/2017    HDL 36 (L) 04/25/2017    LDL 57 04/25/2017    TRIG 206 (H) 04/25/2017    CHOLHDLRATIO 3.6 06/03/2015       LIVER ENZYME RESULTS:  Lab Results   Component Value Date    AST 25 04/25/2017    ALT 27 04/25/2017       CBC RESULTS:  Lab Results   Component Value Date    WBC 10.6 04/25/2017    RBC 4.33 (L) 04/25/2017    HGB 12.9 (L) 04/25/2017    HCT 39.2 (L) 04/25/2017    MCV 91 04/25/2017    MCH 29.8 04/25/2017    MCHC 32.9 04/25/2017    RDW 12.7 04/25/2017     04/25/2017       BMP RESULTS:  Lab Results   Component Value Date     04/25/2017    POTASSIUM 4.5 04/25/2017    CHLORIDE 104 04/25/2017    CO2 23 04/25/2017    ANIONGAP 12 04/25/2017    GLC 96 04/25/2017    BUN 19 04/25/2017    CR 1.00 04/25/2017    GFRESTIMATED 73 06/22/2017    GFRESTBLACK 88 06/22/2017    AWILDA 9.1 04/25/2017        A1C RESULTS:  Lab Results   Component Value Date    A1C 5.9 04/25/2017       INR RESULTS:  Lab Results  "  Component Value Date    INR 2.4 (A) 07/13/2017    INR 2.6 (A) 06/15/2017    INR 2.36 (H) 11/07/2015    INR 2.05 (H) 11/06/2015           CC  Calderon Santo MD   [facility]  [address]  [place] [address]      Date of service:  07/27/2017     Dear Doctors,     I had the pleasure of seeing our mutual patient, Fausto Farr, he is a pleasant 77-year-old gentleman.  I have previously outlined his history but briefly he was originally a patient of my partner, Dr. Padilla.  He was referred to me for possible perivalvular leak with closure of a previously replaced valve but it turned out, after my evaluation, he actually had malfunction of his native valve and the perivalvular leak.  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  Followup echo last year showed normal LV function but there is LVH, so no doubt there was some diastolic dysfunction.  Of note, his right heart was mildly decreased in function.  An overnight oximetry was abnormal and I encouraged him to seek a formal sleep study.  Although I do not have the results, he tells me that the sleep study was \"borderline\" and they recommended CPAP but the patient declined this.  He does describe daytime sleepiness in addition.  Today he did not realize what my connection was between the sleep study and the heart and today I spent quite a bit of time explaining to him that his right ventricular systolic function is reduced.  We do not know why but sleep apnea is high on the list.  He does not have known lung disease otherwise.  I also would bring up the possibility of diastolic dysfunction with elevated left heart filling pressures causing some right heart issues.  A point against it being left heart disease is the fact that the RV systolic pressure by echo was estimated to be normal.  They did not comment on the echo if the IVC was dilated or not.  The reason I bring this " up is the patient was seen by Dr. Novak for palpitations, I believe it was sinus rhythm with ectopy.  Dr. Higgins increased the patient's beta-blocker from 25 mg 1 tablet b.i.d. to 1-1/2 tablets b.i.d.  Dr. Novak also noted some ankle swelling.  The patient has varicose veins and has had vein stripping and so he always has some ankle edema.  Dr. Novak I believe felt that either the increased palpitations were decreasing cardiac output and causing his shortness of breath or that there was fluid and therefore that is why he increased the metoprolol to the higher dose and also put him on Lasix.  The patient now comes in with a blood pressure of 90 systolic and he is only a little bit dizzy with it.  The patient self decreased the metoprolol back to 25 b.i.d., a lower dose, because of lightheadedness and seems to be doing okay with the palpitations and with breathing.  At today's visit again the blood pressure is low but he is only minimally symptomatic.  I am going to stop the lisinopril, he is only on 2.5 mg, and his ejection fraction is normal.  He is also on Flomax which is a high likelihood that is contributing to some of the lightheadedness and he needs the beta blocker for the ectopy.  I suspect he is actually in sinus rhythm with frequent ectopy, now I am going to have him come back in 2 weeks after he is off lisinopril.  We will get an EKG to see if this is sinus rhythm with ectopy and not atrial fib.  We will also see if he is less lightheaded and if the blood pressure is better off the lisinopril.  We do have the option of switching the diuretic to either every other day or even off depending on the breathing.  The problem of course is the patient got better with Dr. Novak's treatment but we do not know if it was the beta blocker that was the issue or the Lasix that was the issue, so I do not necessarily stop the lisinopril right away and stop the Lasix right away.      The next issue that came up is the  patient is going to be seen by Vascular Surgery.  He has an Endograft in the aorta but he is complaining about what sounds like hip and leg pain and I suspect they are looking to see if he has additional iliac disease.  Since he is on Coumadin for 2 metal valves, he would have to be bridged.  He has not scheduled this procedure yet because his daughter is sick with cancer and his granddaughter is in the hospital with Guillain-[place] so he is going to wait, but when it comes time will have to work with the vascular surgeons, ask them to pick a date for the angiogram that is later in the week and then will back calculate when he should come off Coumadin and when he starts to come into the office for daily INRs for Lovenox.  Similarly in reverse, when he is done with the procedure, will have to start the Coumadin back and have him to go on Lovenox until he is therapeutic on Coumadin based on his INR testing.  Will have to involve Dr. Osborne, I think it was Dr. Osborne who last saw him for the vascular discussion.  Again, as I stated, he is not going to schedule the presumptive arteriogram with Dr. Osborne until things settle out at home.  If we see worsening RV function on the echo, we are of course going to push harder for actual CPAP treatment and we may even want to consider doing a right heart catheterization at the same time that he comes in for his aortic angiogram with Dr. Osborne.  This will all be in the future.      Today's visit was 40 minutes, greater than 50% counseling.        Calderon Santo MD    D:  07/27/2017 11:33 T:  07/27/2017 12:43  Document:  [id number] \CD    cc: MD Tanner Mckenna MD Fareed Siddiqui, MD    Thank you for allowing me to participate in the care of your patient.    Sincerely,     Calderon Santo MD     [facility]

## 2017-07-27 NOTE — PROGRESS NOTES
"  Date of service:  07/27/2017     Dear Doctors,     I had the pleasure of seeing our mutual patient, Fausto Farr, he is a pleasant 77-year-old gentleman.  I have previously outlined his history but briefly he was originally a patient of my partner, Dr. Padilla.  He was referred to me for possible perivalvular leak with closure of a previously replaced valve but it turned out, after my evaluation, he actually had malfunction of his native valve and the perivalvular leak.  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  Followup echo last year showed normal LV function but there is LVH, so no doubt there was some diastolic dysfunction.  Of note, his right heart was mildly decreased in function.  An overnight oximetry was abnormal and I encouraged him to seek a formal sleep study.  Although I do not have the results, he tells me that the sleep study was \"borderline\" and they recommended CPAP but the patient declined this.  He does describe daytime sleepiness in addition.  Today he did not realize what my connection was between the sleep study and the heart and today I spent quite a bit of time explaining to him that his right ventricular systolic function is reduced.  We do not know why but sleep apnea is high on the list.  He does not have known lung disease otherwise.  I also would bring up the possibility of diastolic dysfunction with elevated left heart filling pressures causing some right heart issues.  A point against it being left heart disease is the fact that the RV systolic pressure by echo was estimated to be normal.  They did not comment on the echo if the IVC was dilated or not.  The reason I bring this up is the patient was seen by Dr. Novak for palpitations, I believe it was sinus rhythm with ectopy.  Dr. Higgins increased the patient's beta-blocker from 25 mg 1 tablet b.i.d. to 1-1/2 tablets b.i.d.  Dr. Novak also noted some ankle " swelling.  The patient has varicose veins and has had vein stripping and so he always has some ankle edema.  Dr. Novak I believe felt that either the increased palpitations were decreasing cardiac output and causing his shortness of breath or that there was fluid and therefore that is why he increased the metoprolol to the higher dose and also put him on Lasix.  The patient now comes in with a blood pressure of 90 systolic and he is only a little bit dizzy with it.  The patient self decreased the metoprolol back to 25 b.i.d., a lower dose, because of lightheadedness and seems to be doing okay with the palpitations and with breathing.  At today's visit again the blood pressure is low but he is only minimally symptomatic.  I am going to stop the lisinopril, he is only on 2.5 mg, and his ejection fraction is normal.  He is also on Flomax which is a high likelihood that is contributing to some of the lightheadedness and he needs the beta blocker for the ectopy.  I suspect he is actually in sinus rhythm with frequent ectopy, now I am going to have him come back in 2 weeks after he is off lisinopril.  We will get an EKG to see if this is sinus rhythm with ectopy and not atrial fib.  We will also see if he is less lightheaded and if the blood pressure is better off the lisinopril.  We do have the option of switching the diuretic to either every other day or even off depending on the breathing.  The problem of course is the patient got better with Dr. Novak's treatment but we do not know if it was the beta blocker that was the issue or the Lasix that was the issue, so I do not necessarily stop the lisinopril right away and stop the Lasix right away.      The next issue that came up is the patient is going to be seen by Vascular Surgery.  He has an Endograft in the aorta but he is complaining about what sounds like hip and leg pain and I suspect they are looking to see if he has additional iliac disease.  Since he is on  Coumadin for 2 metal valves, he would have to be bridged.  He has not scheduled this procedure yet because his daughter is sick with cancer and his granddaughter is in the hospital with Guillain-[place] so he is going to wait, but when it comes time will have to work with the vascular surgeons, ask them to pick a date for the angiogram that is later in the week and then will back calculate when he should come off Coumadin and when he starts to come into the office for daily INRs for Lovenox.  Similarly in reverse, when he is done with the procedure, will have to start the Coumadin back and have him to go on Lovenox until he is therapeutic on Coumadin based on his INR testing.  Will have to involve Dr. Osborne, I think it was Dr. Osborne who last saw him for the vascular discussion.  Again, as I stated, he is not going to schedule the presumptive arteriogram with Dr. Osborne until things settle out at home.  If we see worsening RV function on the echo, we are of course going to push harder for actual CPAP treatment and we may even want to consider doing a right heart catheterization at the same time that he comes in for his aortic angiogram with Dr. Osborne.  This will all be in the future.      Today's visit was 40 minutes, greater than 50% counseling.        Calderon Santo MD    D:  07/27/2017 11:33 T:  07/27/2017 12:43  Document:  [id number] SH\CD    cc: MD Tanner Mckenna MD Fareed Siddiqui, MD

## 2017-08-10 ENCOUNTER — ANTICOAGULATION THERAPY VISIT (OUTPATIENT)
Dept: NURSING | Facility: CLINIC | Age: 77
End: 2017-08-10
Payer: MEDICARE

## 2017-08-10 DIAGNOSIS — Z95.2 S/P MITRAL VALVE REPLACEMENT: ICD-10-CM

## 2017-08-10 DIAGNOSIS — Z79.01 LONG-TERM (CURRENT) USE OF ANTICOAGULANTS: ICD-10-CM

## 2017-08-10 DIAGNOSIS — I48.91 AF (ATRIAL FIBRILLATION) (H): ICD-10-CM

## 2017-08-10 LAB — INR POINT OF CARE: 4.3 (ref 0.86–1.14)

## 2017-08-10 PROCEDURE — 85610 PROTHROMBIN TIME: CPT | Mod: QW

## 2017-08-10 PROCEDURE — 36416 COLLJ CAPILLARY BLOOD SPEC: CPT

## 2017-08-10 PROCEDURE — 99207 ZZC NO CHARGE NURSE ONLY: CPT

## 2017-08-10 NOTE — MR AVS SNAPSHOT
Fausto Farr   8/10/2017 8:45 AM   Anticoagulation Therapy Visit    Description:  77 year old male   Provider:  [facility]   Department:  [facility]           INR as of 8/10/2017     Today's INR 4.3!      Anticoagulation Summary as of 8/10/2017     INR goal 2.5-3.5   Today's INR 4.3!   Full instructions 8/10: 2.5 mg; Otherwise 5 mg on Mon; 7.5 mg all other days   Next INR check 9/7/2017    Indications   AF (atrial fibrillation) (H) [I48.91]  S/P mitral valve replacement [Z95.2]  Long-term (current) use of anticoagulants [Z79.01] [Z79.01]         Your next Anticoagulation Clinic appointment(s)     Sep 07, 2017  8:15 AM CDT   Anticoagulation Visit with  ANTICOAGULATION CLINIC   [facility] ([facility])    [address]  [address]  [place] [id number] [phone]              Contact Numbers     [facility]  Please call  [phone] to cancel and/or reschedule your appointment   Please call  [phone] with any problems or questions regarding your therapy.        August 2017 Details    Sun Mon Tue Wed Thu Fri Sat       1               2               3               4               5                 6               7               8               9               10      2.5 mg   See details      11      7.5 mg         12      7.5 mg           13      7.5 mg         14      5 mg         15      7.5 mg         16      7.5 mg         17      7.5 mg         18      7.5 mg         19      7.5 mg           20      7.5 mg         21      5 mg         22      7.5 mg         23      7.5 mg         24      7.5 mg         25      7.5 mg         26      7.5 mg           27      7.5 mg         28      5 mg         29      7.5 mg         30      7.5 mg         31      7.5 mg            Date Details   08/10 This INR check               How to take your warfarin dose     To take:  2.5 mg Take 0.5 of a 5 mg tablet.    To take:  5 mg Take 1 of the 5 mg tablets.     To take:  7.5 mg Take 1.5 of the 5 mg tablets.           September 2017 Details    Sun Mon Tue Wed Thu Fri Sat          1      7.5 mg         2      7.5 mg           3      7.5 mg         4      5 mg         5      7.5 mg         6      7.5 mg         7            8               9                 10               11               12               13               14               15               16                 17               18               19               20               21               22               23                 24               25               26               27               28               29               30                Date Details   No additional details    Date of next INR:  9/7/2017         How to take your warfarin dose     To take:  5 mg Take 1 of the 5 mg tablets.    To take:  7.5 mg Take 1.5 of the 5 mg tablets.

## 2017-08-10 NOTE — PROGRESS NOTES
ANTICOAGULATION FOLLOW-UP CLINIC VISIT    Patient Name:  Fausto Farr  Date:  8/10/2017  Contact Type:  Face to Face    SUBJECTIVE:     Patient Findings     Positives No Problem Findings, Unexplained INR or factor level change    Comments He will be out of town 8/26 to 9/2/17           OBJECTIVE    INR Protime   Date Value Ref Range Status   08/10/2017 4.3 (A) 0.86 - 1.14 Final       ASSESSMENT / PLAN  INR assessment SUPRA    Recheck INR In: 4 WEEKS    INR Location Clinic      Anticoagulation Summary as of 8/10/2017     INR goal 2.5-3.5   Today's INR 4.3!   Maintenance plan 5 mg (5 mg x 1) on Mon; 7.5 mg (5 mg x 1.5) all other days   Full instructions 8/10: 2.5 mg; Otherwise 5 mg on Mon; 7.5 mg all other days   Weekly total 50 mg   Plan last modified Caryn Campos RN (8/10/2017)   Next INR check 9/7/2017   Priority INR   Target end date Indefinite    Indications   AF (atrial fibrillation) (H) [I48.91]  S/P mitral valve replacement [Z95.2]  Long-term (current) use of anticoagulants [Z79.01] [Z79.01]         Anticoagulation Episode Summary     INR check location     Preferred lab     Send INR reminders to [facility]    Comments 5mg tabs - andrade dose // transfer from [facility] // [facility]      Anticoagulation Care Providers     Provider Role Specialty Phone number    Tu Reyes MD  Internal Medicine [phone]            See the Encounter Report to view Anticoagulation Flowsheet and Dosing Calendar (Go to Encounters tab in chart review, and find the Anticoagulation Therapy Visit)        Caryn Campos RN

## 2017-08-11 ENCOUNTER — HOSPITAL ENCOUNTER (OUTPATIENT)
Dept: CARDIOLOGY | Facility: CLINIC | Age: 77
Discharge: HOME | End: 2017-08-11
Attending: INTERNAL MEDICINE | Admitting: INTERNAL MEDICINE
Payer: MEDICARE

## 2017-08-11 DIAGNOSIS — I05.9 MITRAL VALVE DISORDERS(424.0): ICD-10-CM

## 2017-08-11 DIAGNOSIS — I35.9 AORTIC VALVE DEFECT: ICD-10-CM

## 2017-08-11 DIAGNOSIS — I51.9 DISORDER OF RIGHT VENTRICLE OF HEART: ICD-10-CM

## 2017-08-11 PROCEDURE — 40000264 ECHO COMPLETE WITH OPTISON

## 2017-08-11 PROCEDURE — 93306 TTE W/DOPPLER COMPLETE: CPT | Mod: 26 | Performed by: INTERNAL MEDICINE

## 2017-08-11 PROCEDURE — 25500064 ZZH RX 255 OP 636: Performed by: INTERNAL MEDICINE

## 2017-08-11 RX ADMIN — HUMAN ALBUMIN MICROSPHERES AND PERFLUTREN 3 ML: 10; .22 INJECTION, SOLUTION INTRAVENOUS at 09:45

## 2017-08-15 ENCOUNTER — OFFICE VISIT (OUTPATIENT)
Dept: CARDIOLOGY | Facility: CLINIC | Age: 77
End: 2017-08-15
Attending: INTERNAL MEDICINE
Payer: MEDICARE

## 2017-08-15 VITALS
HEIGHT: 65 IN | BODY MASS INDEX: 37.1 KG/M2 | SYSTOLIC BLOOD PRESSURE: 108 MMHG | HEART RATE: 71 BPM | WEIGHT: 222.7 LBS | DIASTOLIC BLOOD PRESSURE: 68 MMHG

## 2017-08-15 DIAGNOSIS — I50.22 CHRONIC SYSTOLIC CONGESTIVE HEART FAILURE (H): Primary | ICD-10-CM

## 2017-08-15 DIAGNOSIS — Z95.1 S/P CABG (CORONARY ARTERY BYPASS GRAFT): ICD-10-CM

## 2017-08-15 DIAGNOSIS — Z95.2 S/P AORTIC VALVE REPLACEMENT: ICD-10-CM

## 2017-08-15 DIAGNOSIS — I05.9 MITRAL VALVE DISORDERS(424.0): ICD-10-CM

## 2017-08-15 DIAGNOSIS — I35.9 AORTIC VALVE DEFECT: ICD-10-CM

## 2017-08-15 DIAGNOSIS — R94.31 ABNORMAL ELECTROCARDIOGRAM: ICD-10-CM

## 2017-08-15 DIAGNOSIS — I51.9 DISORDER OF RIGHT VENTRICLE OF HEART: ICD-10-CM

## 2017-08-15 PROCEDURE — 99214 OFFICE O/P EST MOD 30 MIN: CPT | Mod: 25 | Performed by: NURSE PRACTITIONER

## 2017-08-15 PROCEDURE — 93000 ELECTROCARDIOGRAM COMPLETE: CPT | Performed by: NURSE PRACTITIONER

## 2017-08-15 NOTE — LETTER
8/15/2017    Tu Reyes MD  [address] [place] [address]    RE: Fausto Farr       Dear Colleague,    I had the pleasure of seeing Fausto Farr in the [facility].    DATE OF SERVICE:  08/15/2017      Fausto Farr is a 77-year-old male who presents to [facility] today for a followup visit.  He is a patient of Dr. Santo seen in our clinic for coronary artery disease, valvular disease, supraventricular tachycardia, untreated sleep apnea, hyperlipidemia, right ventricular dysfunction, chronic diastolic heart failure, peripheral artery disease.      Initially he underwent an aortic valve replacement in 2006.  He was found to have significant coronary artery disease and at that time underwent coronary bypass grafting with a LIMA to his LAD and a left radial graft to his right coronary artery.  He never had experienced any angina symptoms.  Over the years, he developed a perivalvular leak and during evaluation was also found to have significant mitral stenosis.  Subsequently, he underwent redo mechanical aortic valve replacement and mechanical valve replacement in 2013.  He has remained on chronic warfarin.  He has not had any angina symptoms in followup.  Within the past couple of years he has had issues with paroxysmal supraventricular tachycardia and was seen by Dr. Novak.  Holter monitoring has demonstrated sinus rhythm with prolonged OH interval and right bundle branch block and left anterior fascicular block.  It also showed episodes of second-degree AV block Mobitz type I.  He was asked to increase his metoprolol.  In addition, he had some mild signs of heart failure and Lasix was added last year.      Fausto also has a history of peripheral artery disease and underwent infrarenal abdominal aortic aneurysm repair in 2008.  He is followed by Dr. Osborne and recently underwent a carotid ultrasound demonstrating  less than 50% bilateral carotid artery disease.  Due to some pain in his hips and legs, Dr. Osborne has recommended possibly proceeding with aortogram; however, his ABIs were normal.  He has had lumbar stenosis and previous surgery in his lumbar region.      Fausto underwent a sleep study last year and was shown to have moderate obstructive sleep apnea; however, he declined CPAP.  Echocardiograms have demonstrated normal left ventricular function with mild right ventricular dysfunction.      He was seen by Dr. Santo for an annual followup last month.  Due to some concern with lightheaded spells and soft systolic blood pressure readings, he was asked to stop his lisinopril.  Due to his irregular rhythm when listening to him, he asked for him to return with an EKG and a repeat echocardiogram.      Fausto comes in with his wife today.  He states since lowering the dose of lisinopril, his lightheadedness has resolved.  He is an active  without any limitations.  He has no significant shortness of breath.  Fausto has no sensations of palpitations or near-syncope.  He has no chest pain with activity or at rest.      I reviewed his echocardiogram done on 08/11/2017.  This showed normal left ventricular function, mild right ventricular enlargement, normal prosthetic mitral valve and aortic valve gradients.  There is no significant change.      He did undergo an EKG in our office, which I reviewed myself.  This shows sinus rhythm with first-degree AV block, right bundle branch block with a heart rate of  70 beats per minute and occasional premature ventricular contraction.  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      PHYSICAL EXAMINATION:  His blood pressure today is 108/68 mmHg, heart rate is 71 beats per minute and is somewhat irregular.  His lungs are clear.  He does have trace bilateral edema.     Outpatient Encounter Prescriptions as of 8/15/2017   Medication Sig  Dispense Refill     metoprolol (LOPRESSOR) 25 MG tablet Take 1 tablet (25 mg) by mouth 2 times daily 180 tablet 3     fluocinonide (LIDEX) 0.05 % ointment 2- 30 gram tubes.  Apply twice a day as needed. 60 g 2     warfarin (COUMADIN) 5 MG tablet TAKE 1 AND 1/2 TABLETS BY MOUTH DAILY OR AS DIRECTED 135 tablet 1     rosuvastatin (CRESTOR) 40 MG tablet TAKE 1 TABLET BY MOUTH DAILY 90 tablet 2     mesalamine (ASACOL HD) 800 MG EC tablet Take 1 tablet (800 mg) by mouth 2 times daily 180 tablet 3     ZETIA 10 MG tablet TAKE 1 TABLET BY MOUTH DAILY 90 tablet 2     furosemide (LASIX) 40 MG tablet Take 0.5 tablets (20 mg) by mouth daily 45 tablet 3     tamsulosin (FLOMAX) 0.4 MG 24 hr capsule TAKE 1 CAPSULE BY MOUTH EVERY DAY 90 capsule 3     betamethasone valerate (VALISONE) 0.1 % lotion APPLY TOPICALLY TWICE DAILY PRN 60 mL 3     ASPIRIN EC PO Take 81 mg by mouth every evening       Omega-3 Fatty Acids (OMEGA-3 FISH OIL PO) Take 2 g by mouth daily        No facility-administered encounter medications on file as of 8/15/2017.       IMPRESSION AND PLAN:   1.  Valvular disease.  History of redo mechanical mitral valve replacement, aortic valve replacement in 2014.  Recent echocardiogram shows preserved LV function with normal prosthetic mitral valve and aortic valve gradients.  We will continue with warfarin.     2.  Coronary artery disease.  History of bypass grafting in 2006.  He is free from angina symptoms; however, they were quite minimal prior to his bypass grafting.  Coronary angiography in 2013 showed no significant obstructive coronary artery disease.  Will continue with beta blockade, aspirin.   3.  Irregular rhythm.  Fausto has had evidence of sinus tachycardia with some supraventricular tachycardia and Wenckebach on monitoring.  He is asymptomatic and tolerating metoprolol.  There is no evidence of atrial flutter on today's electrocardiogram.   4.  Chronic stable diastolic heart failure.  He is tolerating low-dose  Lasix 6 days a week.  He does try to watch the salt in his diet and has not noticed significant weight fluctuations at home.  I will not make any changes.   5.  Untreated sleep apnea.  I talked with Ralph about this today and recommended revisit with Dr. Choudhary  in regard to moderate sleep apnea.   6.  Treated hyperlipidemia.   7.  Peripheral artery disease.  History of infrarenal abdominal aortic aneurysm repair in 2008.  He is planning on undergoing aortogram for assessment of hip and leg pain in the future, which would need to be coordinated with bridging for his warfarin that would need to be held.  He actually is thinking some of his pain may be related to his previous lumbar stenosis and surgery and is planning on seeing Dr. Dominguez  We could consider right heart cath if he does undergo aortogram.      Thank you for allowing me to participate in this patient's care.  Will follow up next year as planned.     Sincerely,    ADELE Solis CNP     [facility]

## 2017-08-15 NOTE — MR AVS SNAPSHOT
"              After Visit Summary   8/15/2017    Fausto Farr    MRN: [id number]           Patient Information     Date Of Birth          1940        Visit Information        Provider Department      8/15/2017 3:50 PM Marta Casper APRN CNP [facility] [employer]        Today's Diagnoses     Chronic systolic congestive heart failure (H)    -  1    Mitral valve disorder        Aortic valve defect        Disorder of right ventricle of heart        S/P aortic valve replacement        S/P CABG (coronary artery bypass graft)        Abnormal electrocardiogram           Follow-ups after your visit        Your next 10 appointments already scheduled     Sep 07, 2017  8:15 AM CDT   Anticoagulation Visit with  ANTICOAGULATION CLINIC   [facility]    [address]  [address]  [place] [address] [phone]              Who to contact     If you have questions or need follow up information about today's clinic visit or your schedule please contact [facility] [employer] directly at [phone].  Normal or non-critical lab and imaging results will be communicated to you by JRapidhart, letter or phone within 4 business days after the clinic has received the results. If you do not hear from us within 7 days, please contact the clinic through Civolutiont or phone. If you have a critical or abnormal lab result, we will notify you by phone as soon as possible.  Submit refill requests through Prysm or call your pharmacy and they will forward the refill request to us. Please allow 3 business days for your refill to be completed.          Additional Information About Your Visit        JRapidhart Information     Prysm lets you send messages to your doctor, view your test results, renew your prescriptions, schedule appointments and more. To sign up, go to [email or web address] . Click on \"Log in\" on the left side " "of the screen, which will take you to the Welcome page. Then click on \"Sign up Now\" on the right side of the page.     You will be asked to enter the access code listed below, as well as some personal information. Please follow the directions to create your username and password.     Your access code is: [email or web address]  Expires: 10/25/2017 10:33 AM     Your access code will  in 90 days. If you need help or a new code, please call your [facility] clinic or [phone].        Care EveryWhere ID     This is your Care EveryWhere ID. This could be used by other organizations to access your [facility] medical records  [id number]        Your Vitals Were     Pulse Height BMI (Body Mass Index)             71 1.651 m (5' 5\") 37.06 kg/m2          Blood Pressure from Last 3 Encounters:   08/15/17 108/68   17 90/60   17 121/69    Weight from Last 3 Encounters:   08/15/17 101 kg (222 lb 11.2 oz)   17 100.2 kg (221 lb)   17 101.6 kg (224 lb)              We Performed the Following     EKG 12-lead complete w/read - Clinics (to be scheduled)     Follow-Up with Cardiologist        Primary Care Provider Office Phone # Fax #    Tu Reyes [phone] [address] [place] [address]        Equal Access to Services     [facility]: Hadii aad ku hadasho Sobridger, waaxda luqadaha, qaybta kaalmada sujey, denny goldberg. So [facility] [phone].    ATENCIÓN: Si habla español, tiene a brown disposición servicios gratuitos de asistencia lingüística. Llame al [phone].    We comply with applicable federal civil rights laws and [place] laws. We do not discriminate on the basis of race, color, national origin, age, disability sex, sexual orientation or gender identity.            Thank you!     Thank you for choosing [facility]  for your care. Our goal is always to provide you with excellent care. " Hearing back from our patients is one way we can continue to improve our services. Please take a few minutes to complete the written survey that you may receive in the mail after your visit with us. Thank you!             Your Updated Medication List - Protect others around you: Learn how to safely use, store and throw away your medicines at www.disposemymeds.org.          This list is accurate as of: 8/15/17  4:27 PM.  Always use your most recent med list.                   Brand Name Dispense Instructions for use Diagnosis    ASPIRIN EC PO      Take 81 mg by mouth every evening        betamethasone valerate 0.1 % lotion    VALISONE    60 mL    APPLY TOPICALLY TWICE DAILY PRN    Eczema, unspecified type       fluocinonide 0.05 % ointment    LIDEX    60 g    2- 30 gram tubes.  Apply twice a day as needed.    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)       metoprolol 25 MG tablet    LOPRESSOR    180 tablet    Take 1 tablet (25 mg) by mouth 2 times daily    Coronary artery disease involving coronary bypass graft 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       OMEGA-3 FISH OIL PO      Take 2 g by mouth daily        rosuvastatin 40 MG tablet    CRESTOR    90 tablet    TAKE 1 TABLET BY MOUTH DAILY    Hyperlipidemia       tamsulosin 0.4 MG capsule    FLOMAX    90 capsule    TAKE 1 CAPSULE BY MOUTH EVERY DAY    Urinary retention       warfarin 5 MG tablet    COUMADIN    135 tablet    TAKE 1 AND 1/2 TABLETS BY MOUTH DAILY OR AS DIRECTED    Long-term (current) use of anticoagulants, S/P aortic valve replacement       ZETIA 10 MG tablet   Generic drug:  ezetimibe     90 tablet    TAKE 1 TABLET BY MOUTH DAILY    Mixed hyperlipidemia

## 2017-08-15 NOTE — PROGRESS NOTES
HPI and Plan: #[id number]  See dictation    Orders Placed This Encounter   Procedures     Lipid Profile     ALT     Basic metabolic panel     Follow-Up with Cardiologist     EKG 12-lead complete w/read - Clinics     Echocardiogram       No orders of the defined types were placed in this encounter.      There are no discontinued medications.      Encounter Diagnoses   Name Primary?     Mitral valve disorder      Aortic valve defect      Disorder of right ventricle of heart      Chronic systolic congestive heart failure (H) Yes     S/P aortic valve replacement      S/P CABG (coronary artery bypass graft)      Abnormal electrocardiogram        CURRENT MEDICATIONS:  Current Outpatient Prescriptions   Medication Sig Dispense Refill     metoprolol (LOPRESSOR) 25 MG tablet Take 1 tablet (25 mg) by mouth 2 times daily 180 tablet 3     fluocinonide (LIDEX) 0.05 % ointment 2- 30 gram tubes.  Apply twice a day as needed. 60 g 2     warfarin (COUMADIN) 5 MG tablet TAKE 1 AND 1/2 TABLETS BY MOUTH DAILY OR AS DIRECTED 135 tablet 1     rosuvastatin (CRESTOR) 40 MG tablet TAKE 1 TABLET BY MOUTH DAILY 90 tablet 2     mesalamine (ASACOL HD) 800 MG EC tablet Take 1 tablet (800 mg) by mouth 2 times daily 180 tablet 3     ZETIA 10 MG tablet TAKE 1 TABLET BY MOUTH DAILY 90 tablet 2     furosemide (LASIX) 40 MG tablet Take 0.5 tablets (20 mg) by mouth daily 45 tablet 3     tamsulosin (FLOMAX) 0.4 MG 24 hr capsule TAKE 1 CAPSULE BY MOUTH EVERY DAY 90 capsule 3     betamethasone valerate (VALISONE) 0.1 % lotion APPLY TOPICALLY TWICE DAILY PRN 60 mL 3     ASPIRIN EC PO Take 81 mg by mouth every evening       Omega-3 Fatty Acids (OMEGA-3 FISH OIL PO) Take 2 g by mouth daily          ALLERGIES     Allergies   Allergen Reactions     Bees Anaphylaxis       PAST MEDICAL HISTORY:  Past Medical History:   Diagnosis Date     Abdominal pain      Abnormal ECG     RBBB, 1st degree AVB, Left axis deviation     Anemia     currently taking iron     Arrhythmia      pac, pvc     Back pain     since 1980     Bruit      CAD (coronary artery disease)      Cellulitis 10/18/12     Contact dermatitis and other eczema, due to unspecified cause      Diaphragmatic hernia without mention of obstruction or gangrene      Gastric ulcer      Glucose intolerance (impaired glucose tolerance)      Heart murmur 9/16/13    valvular heart disease     Hyperlipidaemia      Hypertension 8/6/13     Lumbago      Malaise and fatigue      Mobitz (type) I (Wenckebach's) atrioventricular block     and RBBB     Nocturia 10/18/12     Nocturia      Nonallopathic lesion of cervical region      Nonallopathic lesion of lumbar region      Nonallopathic lesion of pelvic region, not elsewhere classified      Nonallopathic lesion of rib cage      Nonallopathic lesion of sacral region      Paroxysmal atrial fibrillation (H) 10/18/12     Prostate cancer (H) 2008    radiation seed, XRT      PVD (peripheral vascular disease) (H)      Rotator cuff strain     and sprain     S/P aortic valve replacement 2006    developed perivalve leak and MS, therefore redo surg 2013     S/P CABG (coronary artery bypass graft) 2006    Lima-Lad, RA-Rca     Sciatica of left side     since 2000     Sleep apnea     pt declined cpap     Ulcerative colitis (H)      Vitamin D deficiency        PAST SURGICAL HISTORY:  Past Surgical History:   Procedure Laterality Date     AORTIC VALVE REPLACEMENT  1/3/06    redo AVR SJM 21mm and SJM MVR 27mm in 2013SJM 21([id number]):AVR, SJM 27 :MVR-     ARTHROPLASTY KNEE      right knee     BACK SURGERY  Oct 2015    Fusion L4-5, laminectomy L2, L3     BYPASS GRAFT ARTERY CORONARY  10/2013    reimplantation of radial artery graft to RCA     C CABG, VEIN, TWO  1/3/06    Left radial to RCA, LIMA to LAD (RA to RCA reimplanted at time of 2013 surg)     CARDIAC CATHERIZATION  11/2005    Stent placed to RCA     CARDIAC CATHERIZATION  04/2013    Occluded RCA, patent LIMA to LAD and radial graft to PDA      CARPAL TUNNEL RELEASE RT/LT  1994     COLONOSCOPY  8-22-11     CYSTOSCOPY FLEXIBLE  10/16/2013    Procedure: CYSTOSCOPY FLEXIBLE;  FLEXIBLE CYSTOSCOPY / DILATION OF URETHRA / INSERTION OF LESLIE;  Surgeon: Cooper Wallace MD;  Location: [facility] OR     ENDOVASCULAR REPAIR ANEURYSM ABDOMINAL AORTA  2006     ENDOVASCULAR REPAIR, INFRARENAL ABDOMINAL AORTIC ANEURYSM/DISSECTION; MODULAR BIFURCATED PROSTHESIS      AAA repair endovascular     ENT SURGERY       GENITOURINARY SURGERY  6/16/08    radioactive seeding     HEAD & NECK SURGERY  1997    vocal cord polypectomy     KNEE SURGERY  2001 Right knee arthroscopy     OPTICAL TRACKING SYSTEM FUSION SPINE POSTERIOR LUMBAR THREE+ LEVELS N/A 10/29/2015    Procedure: OPTICAL TRACKING SYSTEM FUSION SPINE POSTERIOR LUMBAR THREE+ LEVELS;  Surgeon: Walt Garcia MD;  Location: [facility] OR     PROSTATE SURGERY  06/16/2008 Radioactive seeding     PROSTATE SURGERY      radioactive seeding 6/16/08     REPAIR ANEURYSM ABDOMINAL AORTA  06/08     REPAIR VALVE MITRAL  10/16/2013    SJM 21([id number]):AVR, SJM 27 :MVRProcedure: REPAIR VALVE MITRAL;  REDO STERNOTOMY/REDO AORTIC VALVE REPLACEMENT/ MITRAL VALVE REPLACEMENT/REIMPLANTATION OF RIGHT CORONARY ARTERY BYPASS WITH RACHAEL ( ON PUMP);  Surgeon: Viet Singh MD;  Location:  OR     REPLACE VALVE AORTIC  10/16/2013    Procedure: REPLACE VALVE AORTIC;;  Surgeon: Viet Singh MD;  Location:  OR     SURGERY GENERAL IP CONSULT  05/2008 Excision aneurysm abdominal aorta     SURGERY GENERAL IP CONSULT  1997 Vocal cord polypectomy     VASCULAR SURGERY  1968, 1993     varicose vein stripping       FAMILY HISTORY:  Family History   Problem Relation Age of Onset     Coronary Artery Disease Father      CABG     HEART DISEASE Father      Pacemaker     HEART DISEASE Brother        SOCIAL HISTORY:  Social History     Social History     Marital status:      Spouse name: N/A     Number of children: N/A     Years of education:  "N/A     Social History Main Topics     Smoking status: Former Smoker     Packs/day: 1.00     Years: 40.00     Quit date: 10/23/2002     Smokeless tobacco: Never Used     Alcohol use Yes      Comment: a couple beers per week     Drug use: No     Sexual activity: No     Other Topics Concern     Parent/Sibling W/ Cabg, Mi Or Angioplasty Before 65f 55m? Yes     Brother had bypass at 55     Caffeine Concern No     6-8 cups of half and half per day     Special Diet No     Exercise No     Social History Narrative       Review of Systems:  Skin:  Positive for rash rash under arms occ   Eyes:  Positive for glasses    ENT:  Positive for hearing loss hearing aids  Respiratory:  Positive for dyspnea on exertion stairs   Cardiovascular:    Positive for;lightheadedness;fatigue when getting up quickly  Gastroenterology: Positive for reflux couple times a week uses tums   Genitourinary:  Positive for nocturia    Musculoskeletal:  Positive for arthritis;joint pain;nocturnal cramping knees, hips, possibly lower back  Neurologic:  Negative      Psychiatric:  Negative      Heme/Lymph/Imm:  Negative      Endocrine:  Negative        Physical Exam:  Vitals: /68 (BP Location: Right arm, Patient Position: Chair, Cuff Size: Adult Large)  Pulse 71  Ht 1.651 m (5' 5\")  Wt 101 kg (222 lb 11.2 oz)  BMI 37.06 kg/m2    Constitutional:  cooperative   in pain from back issue    Skin:  warm and dry to the touch        Head:  normocephalic, no masses or lesions        Eyes:  pupils equal and round        ENT:  no pallor or cyanosis, dentition good        Neck:  JVP normal   minimal bruit (carotid john <50% bilat)    Chest:  clear to auscultation;normal respiratory excursion     well healed sternotomy    Cardiac: regular rhythm frequent premature beats   crisp prosthetic valve sounds early systolic murmur;grade 1          Abdomen:  abdomen soft, non-tender, BS normoactive, no mass, no HSM, no bruits obese      Vascular: pulses full and equal, " no bruits auscultated                                   difficult to feel bilat fem and DP pulses both probably 1+ and no bruits    Extremities and Back:      trace;bilateral LE edema;L greater than R     marked varicose veins L>R, prior vein strip    Neurological:  affect appropriate, oriented to time, person and place              CC  Calderon Santo MD  [address]  [place] [address]

## 2017-08-16 NOTE — PROGRESS NOTES
DATE OF SERVICE:  08/15/2017      HISTORY OF PRESENT ILLNESS:  Fausto Farr is a 77-year-old male who presents to [facility] today for a followup visit.  He is a patient of Dr. Santo seen in our clinic for coronary artery disease, valvular disease, supraventricular tachycardia, untreated sleep apnea, hyperlipidemia, right ventricular dysfunction, chronic diastolic heart failure, peripheral artery disease.      Initially he underwent an aortic valve replacement in 2006.  He was found to have significant coronary artery disease and at that time underwent coronary bypass grafting with a LIMA to his LAD and a left radial graft to his right coronary artery.  He never had experienced any angina symptoms.  Over the years, he developed a perivalvular leak and during evaluation was also found to have significant mitral stenosis.  Subsequently, he underwent redo mechanical aortic valve replacement and mechanical valve replacement in 2013.  He has remained on chronic warfarin.  He has not had any angina symptoms in followup.  Within the past couple of years he has had issues with paroxysmal supraventricular tachycardia and was seen by Dr. Novak.  Holter monitoring has demonstrated sinus rhythm with prolonged NC interval and right bundle branch block and left anterior fascicular block.  It also showed episodes of second-degree AV block Mobitz type I.  He was asked to increase his metoprolol.  In addition, he had some mild signs of heart failure and Lasix was added last year.      Fausto also has a history of peripheral artery disease and underwent infrarenal abdominal aortic aneurysm repair in 2008.  He is followed by Dr. Osborne and recently underwent a carotid ultrasound demonstrating less than 50% bilateral carotid artery disease.  Due to some pain in his hips and legs, Dr. Osborne has recommended possibly proceeding with aortogram; however, his ABIs were normal.  He has had lumbar  stenosis and previous surgery in his lumbar region.      Fausto underwent a sleep study last year and was shown to have moderate obstructive sleep apnea; however, he declined CPAP.  Echocardiograms have demonstrated normal left ventricular function with mild right ventricular dysfunction.      He was seen by Dr. Santo for an annual followup last month.  Due to some concern with lightheaded spells and soft systolic blood pressure readings, he was asked to stop his lisinopril.  Due to his irregular rhythm when listening to him, he asked for him to return with an EKG and a repeat echocardiogram.      Fausto comes in with his wife today.  He states since lowering the dose of lisinopril, his lightheadedness has resolved.  He is an active  without any limitations.  He has no significant shortness of breath.  Fausto has no sensations of palpitations or near-syncope.  He has no chest pain with activity or at rest.      I reviewed his echocardiogram done on 08/11/2017.  This showed normal left ventricular function, mild right ventricular enlargement, normal prosthetic mitral valve and aortic valve gradients.  There is no significant change.      He did undergo an EKG in our office, which I reviewed myself.  This shows sinus rhythm with first-degree AV block, right bundle branch block with a heart rate of  70 beats per minute and occasional premature ventricular contraction.  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      PHYSICAL EXAMINATION:  His blood pressure today is 108/68 mmHg, heart rate is 71 beats per minute and is somewhat irregular.  His lungs are clear.  He does have trace bilateral edema.      IMPRESSION AND PLAN:   1.  Valvular disease.  History of redo mechanical mitral valve replacement, aortic valve replacement in 2014.  Recent echocardiogram shows preserved LV function with normal prosthetic mitral valve and aortic valve gradients.  We will continue with  warfarin.     2.  Coronary artery disease.  History of bypass grafting in .  He is free from angina symptoms; however, they were quite minimal prior to his bypass grafting.  Coronary angiography in  showed no significant obstructive coronary artery disease.  Will continue with beta blockade, aspirin.   3.  Irregular rhythm.  Fausto has had evidence of sinus tachycardia with some supraventricular tachycardia and Wenckebach on monitoring.  He is asymptomatic and tolerating metoprolol.  There is no evidence of atrial flutter on today's electrocardiogram.   4.  Chronic stable diastolic heart failure.  He is tolerating low-dose Lasix 6 days a week.  He does try to watch the salt in his diet and has not noticed significant weight fluctuations at home.  I will not make any changes.   5.  Untreated sleep apnea.  I talked with Ralph about this today and recommended revisit with Dr. Choudhary  in regard to moderate sleep apnea.   6.  Treated hyperlipidemia.   7.  Peripheral artery disease.  History of infrarenal abdominal aortic aneurysm repair in .  He is planning on undergoing aortogram for assessment of hip and leg pain in the future, which would need to be coordinated with bridging for his warfarin that would need to be held.  He actually is thinking some of his pain may be related to his previous lumbar stenosis and surgery and is planning on seeing Dr. Dominguez  We could consider right heart cath if he does undergo aortogram.      Thank you for allowing me to participate in this patient's care.  Will follow up next year as planned.         ADELE NICHOLS, CNP             D: 08/15/2017 16:45   T: 08/15/2017 20:00   MT: NAEL      Name:     FAUSTO VAUGHN   MRN:      [id number]        Account:      [id number]   :      1940           Service Date: 08/15/2017      Document: [id number]

## 2017-08-22 ENCOUNTER — TRANSFERRED RECORDS (OUTPATIENT)
Dept: HEALTH INFORMATION MANAGEMENT | Facility: CLINIC | Age: 77
End: 2017-08-22

## 2017-09-07 ENCOUNTER — ANTICOAGULATION THERAPY VISIT (OUTPATIENT)
Dept: NURSING | Facility: CLINIC | Age: 77
End: 2017-09-07
Payer: MEDICARE

## 2017-09-07 DIAGNOSIS — I48.91 AF (ATRIAL FIBRILLATION) (H): ICD-10-CM

## 2017-09-07 DIAGNOSIS — Z95.2 S/P MITRAL VALVE REPLACEMENT: ICD-10-CM

## 2017-09-07 DIAGNOSIS — Z79.01 LONG-TERM (CURRENT) USE OF ANTICOAGULANTS: ICD-10-CM

## 2017-09-07 LAB — INR POINT OF CARE: 2.2 (ref 0.86–1.14)

## 2017-09-07 PROCEDURE — 99207 ZZC NO CHARGE NURSE ONLY: CPT

## 2017-09-07 PROCEDURE — 36416 COLLJ CAPILLARY BLOOD SPEC: CPT

## 2017-09-07 PROCEDURE — 85610 PROTHROMBIN TIME: CPT | Mod: QW

## 2017-09-07 NOTE — MR AVS SNAPSHOT
Fausto Carson Yordan   9/7/2017 8:15 AM   Anticoagulation Therapy Visit    Description:  77 year old male   Provider:  [facility]   Department:  [facility]           INR as of 9/7/2017     Today's INR 2.2!      Anticoagulation Summary as of 9/7/2017     INR goal 2.5-3.5   Today's INR 2.2!   Full instructions 9/7: 10 mg; Otherwise 7.5 mg every day   Next INR check 10/5/2017    Indications   AF (atrial fibrillation) (H) [I48.91]  S/P mitral valve replacement [Z95.2]  Long-term (current) use of anticoagulants [Z79.01] [Z79.01]         Your next Anticoagulation Clinic appointment(s)     Oct 05, 2017  8:15 AM CDT   Anticoagulation Visit with  ANTICOAGULATION CLINIC   [facility] ([facility])    [address] [place] [address]  [address]  [facility] [address] [phone]              Contact Numbers     [facility]  Please call  [phone] to cancel and/or reschedule your appointment   Please call  [phone] with any problems or questions regarding your therapy.        September 2017 Details    Sun Mon Tue Wed Thu Fri Sat          1               2                 3               4               5               6               7      10 mg   See details      8      7.5 mg         9      7.5 mg           10      7.5 mg         11      7.5 mg         12      7.5 mg         13      7.5 mg         14      7.5 mg         15      7.5 mg         16      7.5 mg           17      7.5 mg         18      7.5 mg         19      7.5 mg         20      7.5 mg         21      7.5 mg         22      7.5 mg         23      7.5 mg           24      7.5 mg         25      7.5 mg         26      7.5 mg         27      7.5 mg         28      7.5 mg         29      7.5 mg         30      7.5 mg          Date Details   09/07 This INR check               How to take your warfarin dose     To take:  7.5 mg Take 1.5 of the 5 mg tablets.    To take:  10 mg Take 2 of the 5 mg tablets.           October  2017 Details    Sun Mon Tue Wed Thu Fri Sat     1      7.5 mg         2      7.5 mg         3      7.5 mg         4      7.5 mg         5            6               7                 8               9               10               11               12               13               14                 15               16               17               18               19               20               21                 22               23               24               25               26               27               28                 29               30               31                    Date Details   No additional details    Date of next INR:  10/5/2017         How to take your warfarin dose     To take:  7.5 mg Take 1.5 of the 5 mg tablets.

## 2017-09-07 NOTE — PROGRESS NOTES
ANTICOAGULATION FOLLOW-UP CLINIC VISIT    Patient Name:  Fausto Farr  Date:  9/7/2017  Contact Type:  Face to Face    SUBJECTIVE:     Patient Findings     Positives Change in diet/appetite    Comments Garden is still producing and he is eating a lot of broccoli.           OBJECTIVE    INR Protime   Date Value Ref Range Status   09/07/2017 2.2 (A) 0.86 - 1.14 Final       ASSESSMENT / PLAN  INR assessment SUB    Recheck INR In: 4 WEEKS    INR Location Clinic      Anticoagulation Summary as of 9/7/2017     INR goal 2.5-3.5   Today's INR 2.2!   Maintenance plan 7.5 mg (5 mg x 1.5) every day   Full instructions 9/7: 10 mg; Otherwise 7.5 mg every day   Weekly total 52.5 mg   Plan last modified Caryn Campos RN (9/7/2017)   Next INR check 10/5/2017   Priority INR   Target end date Indefinite    Indications   AF (atrial fibrillation) (H) [I48.91]  S/P mitral valve replacement [Z95.2]  Long-term (current) use of anticoagulants [Z79.01] [Z79.01]         Anticoagulation Episode Summary     INR check location     Preferred lab     Send INR reminders to [facility]    Comments 5mg tabs - andrade dose // transfer from [facility] // [facility]      Anticoagulation Care Providers     Provider Role Specialty Phone number    Tu Reyes MD  Internal Medicine [phone]            See the Encounter Report to view Anticoagulation Flowsheet and Dosing Calendar (Go to Encounters tab in chart review, and find the Anticoagulation Therapy Visit)        Caryn Campos RN

## 2017-09-08 DIAGNOSIS — G47.33 OBSTRUCTIVE SLEEP APNEA: Primary | ICD-10-CM

## 2017-09-08 NOTE — PROGRESS NOTES
Telephone call-patient not available.    Patient did not start CPAP at time of sleep study 8/16 and now requesting new order.    aCPAP 6-16 started with 6-8 week f/u

## 2017-09-11 ENCOUNTER — DOCUMENTATION ONLY (OUTPATIENT)
Dept: SLEEP MEDICINE | Facility: CLINIC | Age: 77
End: 2017-09-11

## 2017-09-11 NOTE — PROGRESS NOTES
PATIENT WILL NEED NEW F2F NOTES HE IS STRAIGHT MEDICARE AND THEY ARE ONLY GOOD FOR 6 MONTHS.  SENT EMAIL TO [facility] TO CALL PATIENT AND LET HIM KNOW HE WILL NEED AN APPOINTMENT.

## 2017-09-12 ENCOUNTER — OFFICE VISIT (OUTPATIENT)
Dept: SLEEP MEDICINE | Facility: CLINIC | Age: 77
End: 2017-09-12
Payer: MEDICARE

## 2017-09-12 VITALS
DIASTOLIC BLOOD PRESSURE: 81 MMHG | OXYGEN SATURATION: 98 % | RESPIRATION RATE: 12 BRPM | SYSTOLIC BLOOD PRESSURE: 131 MMHG | HEIGHT: 65 IN | BODY MASS INDEX: 36.65 KG/M2 | WEIGHT: 220 LBS | HEART RATE: 60 BPM

## 2017-09-12 DIAGNOSIS — R09.02 HYPOXEMIA: ICD-10-CM

## 2017-09-12 DIAGNOSIS — G47.33 OSA (OBSTRUCTIVE SLEEP APNEA): Primary | ICD-10-CM

## 2017-09-12 PROCEDURE — 99214 OFFICE O/P EST MOD 30 MIN: CPT | Performed by: FAMILY MEDICINE

## 2017-09-12 NOTE — PROGRESS NOTES
"[facility]  Sleep Medicine Follow Up Note  September 12, 2017      Fausto Farr MRN# [id number]   Age: 77 year old YOB: 1940     Date of Consultation: September 12, 2017    Primary care provider: Tu Reyes     History taken from: Patient    Fausto Farr is a 77 year old male seen in the Sleep Clinic  for   Chief Complaint   Patient presents with     RECHECK     Wants cpap          Assessment and Plan:   Diagnoses:  (1) GAGE  (2) Sleep Associated Hypoxemia    Discussion:  This patient was diagnosed with moderate GAGE with an AHI of 18.8 events per hour. Sleep associated hypoxemia was noted. The patient was placed in the past on APAP at minimum pressure of 6 cmH2O and maximum pressure of 16 cmH2O. However, the patient never picked up the treatment. He explains that he is ready to start treatment right now. Today, the nature and pathophysiology of GAGE were discussed. The different treatment options for GAGE were also reviewed and explained today. APAP ordered was renewed today and compliance was discussed and explained. Given this patient cardiac history, he should be placed on a rather narrow PAP range. As such, APAP settings may be adjusted based on the PAP download information. Lifestyle recommendations including healthy dietary and exercising habits were discussed. Pt will follow up with Dr Dobbins after using the device for 6 weeks.    Patient understands and agrees with assessment and plan. All questions and concerns were addressed.     Pt was asked to not operate any heavy machinery, work at heights, or engage in life-threatening activities if she feels sleepy or \"drowsy.\"     Total of 30 minutes was spent in face to face contact with patient with > 50% in counseling and coordination of care.  Options for treatment and/or follow-up care were reviewed with the patient. Fausto Farr was engaged and actively involved in the decision making " process. He verbalized understanding of the options discussed and was satisfied with the final plan.    RTC in 6 weeks for follow up of GAGE (or sooner if develops new or worsening symptoms).    Thank you for allowing me participate in the care of Fausto Farr.         Reason for Follow Up:   CC: Male pt presents for an GAGE follow up visit.         History of Present Illness:     77 year old  male pt presents for an GAGE follow up visit. This is a patient of Dr Dobbins that was diagnosed with moderate GAGE with an AHI of 18.8 events per hour (condition was more predominant during REM sleep). Sleep associated hypoxemia was noted (11.3 minutes below SpO2 88%). The patient was started on APAP with minimum pressure of 6 cmH2O and maximum pressure of 12 cmH2O.    The patient explains that he is ready, at this point, to start PAP therapy. He would like me to enter a prescription in the computer for him. He denies any new sxs or concerns at this point.    Recent echocardiogram obtained on 08/11/2017 showed LV with normal size and function with an estimated EF at 60 - 65%. The RV was normal in size, but mildly decreased systolic function was noted. The patient has a mechanical mitral valve in place with only trace regurgitation. There is also an mechanical aortic valve. This study is unchanged / similar from the echo obtained in 2016.           Allergies:     Allergies   Allergen Reactions     Bees Anaphylaxis          Past Medical History:     Past Medical History:   Diagnosis Date     Abdominal pain      Abnormal ECG     RBBB, 1st degree AVB, Left axis deviation     Anemia     currently taking iron     Arrhythmia     pac, pvc     Back pain     since 1980     Bruit      CAD (coronary artery disease)      Cellulitis 10/18/12     Contact dermatitis and other eczema, due to unspecified cause      Diaphragmatic hernia without mention of obstruction or gangrene      Gastric ulcer      Glucose intolerance (impaired  glucose tolerance)      Heart murmur 9/16/13    valvular heart disease     Hyperlipidaemia      Hypertension 8/6/13     Lumbago      Malaise and fatigue      Mobitz (type) I (Wenckebach's) atrioventricular block     and RBBB     Nocturia 10/18/12     Nocturia      Nonallopathic lesion of cervical region      Nonallopathic lesion of lumbar region      Nonallopathic lesion of pelvic region, not elsewhere classified      Nonallopathic lesion of rib cage      Nonallopathic lesion of sacral region      Paroxysmal atrial fibrillation (H) 10/18/12     Prostate cancer (H) 2008    radiation seed, XRT      PVD (peripheral vascular disease) (H)      Rotator cuff strain     and sprain     S/P aortic valve replacement 2006    developed perivalve leak and MS, therefore redo surg 2013     S/P CABG (coronary artery bypass graft) 2006    Lima-Lad, RA-Rca     Sciatica of left side     since 2000     Sleep apnea     pt declined cpap     Ulcerative colitis (H)      Vitamin D deficiency           Past Surgical History:     Past Surgical History:   Procedure Laterality Date     AORTIC VALVE REPLACEMENT  1/3/06    redo AVR SJM 21mm and SJM MVR 27mm in 2013SJM 21([id number]):AVR, SJM 27 :MVR-     ARTHROPLASTY KNEE      right knee     BACK SURGERY  Oct 2015    Fusion L4-5, laminectomy L2, L3     BYPASS GRAFT ARTERY CORONARY  10/2013    reimplantation of radial artery graft to RCA     C CABG, VEIN, TWO  1/3/06    Left radial to RCA, LIMA to LAD (RA to RCA reimplanted at time of 2013 surg)     CARDIAC CATHERIZATION  11/2005    Stent placed to RCA     CARDIAC CATHERIZATION  04/2013    Occluded RCA, patent LIMA to LAD and radial graft to PDA     CARPAL TUNNEL RELEASE RT/LT  1994     COLONOSCOPY  8-22-11     CYSTOSCOPY FLEXIBLE  10/16/2013    Procedure: CYSTOSCOPY FLEXIBLE;  FLEXIBLE CYSTOSCOPY / DILATION OF URETHRA / INSERTION OF LESLIE;  Surgeon: Cooper Wallace MD;  Location: [facility] OR     ENDOVASCULAR REPAIR ANEURYSM ABDOMINAL AORTA  2006      ENDOVASCULAR REPAIR, INFRARENAL ABDOMINAL AORTIC ANEURYSM/DISSECTION; MODULAR BIFURCATED PROSTHESIS      AAA repair endovascular     ENT SURGERY       GENITOURINARY SURGERY  6/16/08    radioactive seeding     HEAD & NECK SURGERY  1997    vocal cord polypectomy     KNEE SURGERY  2001 Right knee arthroscopy     OPTICAL TRACKING SYSTEM FUSION SPINE POSTERIOR LUMBAR THREE+ LEVELS N/A 10/29/2015    Procedure: OPTICAL TRACKING SYSTEM FUSION SPINE POSTERIOR LUMBAR THREE+ LEVELS;  Surgeon: Walt Garcia MD;  Location: [facility] OR     PROSTATE SURGERY  06/16/2008 Radioactive seeding     PROSTATE SURGERY      radioactive seeding 6/16/08     REPAIR ANEURYSM ABDOMINAL AORTA  06/08     REPAIR VALVE MITRAL  10/16/2013    SJM 21([id number]):AVR, SJM 27 :MVRProcedure: REPAIR VALVE MITRAL;  REDO STERNOTOMY/REDO AORTIC VALVE REPLACEMENT/ MITRAL VALVE REPLACEMENT/REIMPLANTATION OF RIGHT CORONARY ARTERY BYPASS WITH RACHAEL ( ON PUMP);  Surgeon: Viet Singh MD;  Location:  OR     REPLACE VALVE AORTIC  10/16/2013    Procedure: REPLACE VALVE AORTIC;;  Surgeon: Viet Singh MD;  Location:  OR     SURGERY GENERAL IP CONSULT  05/2008 Excision aneurysm abdominal aorta     SURGERY GENERAL IP CONSULT  1997 Vocal cord polypectomy     VASCULAR SURGERY  1968, 1993     varicose vein stripping          Social History:     Social History     Social History     Marital status:      Spouse name: N/A     Number of children: N/A     Years of education: N/A     Occupational History     Not on file.     Social History Main Topics     Smoking status: Former Smoker     Packs/day: 1.00     Years: 40.00     Quit date: 10/23/2002     Smokeless tobacco: Never Used     Alcohol use Yes      Comment: a couple beers per week     Drug use: No     Sexual activity: No     Other Topics Concern     Parent/Sibling W/ Cabg, Mi Or Angioplasty Before 65f 55m? Yes     Brother had bypass at 55     Caffeine Concern No     6-8 cups of half and  half per day     Special Diet No     Exercise No     Social History Narrative      Smoking:   Social History   Substance Use Topics     Smoking status: Former Smoker     Packs/day: 1.00     Years: 40.00     Quit date: 10/23/2002     Smokeless tobacco: Never Used     Alcohol use Yes      Comment: a couple beers per week     -Illicit drugs: None Reported  -Alcohol intake: Occasional use only         Family History:     Family History   Problem Relation Age of Onset     Coronary Artery Disease Father      CABG     HEART DISEASE Father      Pacemaker     HEART DISEASE Brother           Immunization:     Immunization History   Administered Date(s) Administered     Influenza (H1N1) 12/17/2009     Influenza (High Dose) 3 valent vaccine 09/16/2013, 10/13/2014, 10/05/2015, 09/30/2016, 09/07/2017     Influenza (IIV3) 10/13/2011, 10/23/2012     Pneumococcal (PCV 13) 10/05/2015     Pneumococcal 23 valent 08/10/2013     TD (ADULT, 7+) 05/07/2003     TDAP Vaccine (Adacel) 09/07/2017     Tdap (Adacel,Boostrix) 06/09/2013     Zoster vaccine, live 12/23/2008           Medications:     Current Outpatient Prescriptions   Medication Sig     metoprolol (LOPRESSOR) 25 MG tablet Take 1 tablet (25 mg) by mouth 2 times daily     fluocinonide (LIDEX) 0.05 % ointment 2- 30 gram tubes.  Apply twice a day as needed.     warfarin (COUMADIN) 5 MG tablet TAKE 1 AND 1/2 TABLETS BY MOUTH DAILY OR AS DIRECTED     rosuvastatin (CRESTOR) 40 MG tablet TAKE 1 TABLET BY MOUTH DAILY     mesalamine (ASACOL HD) 800 MG EC tablet Take 1 tablet (800 mg) by mouth 2 times daily     ZETIA 10 MG tablet TAKE 1 TABLET BY MOUTH DAILY     furosemide (LASIX) 40 MG tablet Take 0.5 tablets (20 mg) by mouth daily     tamsulosin (FLOMAX) 0.4 MG 24 hr capsule TAKE 1 CAPSULE BY MOUTH EVERY DAY     betamethasone valerate (VALISONE) 0.1 % lotion APPLY TOPICALLY TWICE DAILY PRN     ASPIRIN EC PO Take 81 mg by mouth every evening     Omega-3 Fatty Acids (OMEGA-3 FISH OIL PO) Take  "2 g by mouth daily      No current facility-administered medications for this visit.           Review of Systems:   I have done 14 points of review systems and no obvious new pertinent findings reported.    General: no fever, chills, weakness  HENT: No loss of hearing, vertigo, ear ringing, sore throat, epistaxis  EYES: Diplopia, blurry vision, photophobia.  Pulmonary: No dyspnea, wheezing, cough, sputum, hemoptysis, chest pain  Sleep: No EDS, snoring, insomnia, cataplexy, restless legs, REM behavior   CVS: No chest discomfort, palpitations  GI: No diarrhea, constipation, dysphagia, early satiety, bleeding  Renal: No pain on urination, hematuria, freq micturation  Musculoskeletal: No muscle ache, joint pain, swelling  Skin: No rash, ecchymosis, itching, icterus  Neurology: No tingling, weakness, numbness  Heme: No easy bruisibility or bleeding  Allergy: runny nose, sneezing, urticeria  Psychiatry: no change in mood and affect         Physical Examination:   /81  Pulse 60  Resp 12  Ht 1.651 m (5' 5\")  Wt 99.8 kg (220 lb)  SpO2 98%  BMI 36.61 kg/m2    General: Alert, oriented, not in distress  Lungs: both hemithoraces are symmetrical, normal to palpation, no dullness to percussion, auscultation of lungs revealed normal breath sounds with no expirium prolongation, wheezing, rhonci and crackles.  Psychiatry: Mood and affect are appropriate. No SI/HI with adequate insight and judgement.    Manuel Anderson MD, MPH  Sleep Medicine Clinic    [facility] [address], [place], [place] [address] [phone] Clinic    Total time spent with patient: 25 min. Over >50% of the time was spent for face to face counseling, education, and evaluation.      "

## 2017-09-12 NOTE — PATIENT INSTRUCTIONS
Your BMI is Body mass index is 36.61 kg/(m^2).  Weight management is a personal decision.  If you are interested in exploring weight loss strategies, the following discussion covers the approaches that may be successful. Body mass index (BMI) is one way to tell whether you are at a healthy weight, overweight, or obese. It measures your weight in relation to your height.  A BMI of 18.5 to 24.9 is in the healthy range. A person with a BMI of 25 to 29.9 is considered overweight, and someone with a BMI of 30 or greater is considered obese. More than two-thirds of American adults are considered overweight or obese.  Being overweight or obese increases the risk for further weight gain. Excess weight may lead to heart disease and diabetes.  Creating and following plans for healthy eating and physical activity may help you improve your health.  Weight control is part of healthy lifestyle and includes exercise, emotional health, and healthy eating habits. Careful eating habits lifelong are the mainstay of weight control. Though there are significant health benefits from weight loss, long-term weight loss with diet alone may be very difficult to achieve- studies show long-term success with dietary management in less than 10% of people. Attaining a healthy weight may be especially difficult to achieve in those with severe obesity. In some cases, medications, devices and surgical management might be considered.  What can you do?  If you are overweight or obese and are interested in methods for weight loss, you should discuss this with your provider.     Consider reducing daily calorie intake by 500 calories.     Keep a food journal.     Avoiding skipping meals, consider cutting portions instead.    Diet combined with exercise helps maintain muscle while optimizing fat loss. Strength training is particularly important for building and maintaining muscle mass. Exercise helps reduce stress, increase energy, and improves fitness.  Increasing exercise without diet control, however, may not burn enough calories to loose weight.       Start walking three days a week 10-20 minutes at a time    Work towards walking thirty minutes five days a week     Eventually, increase the speed of your walking for 1-2 minutes at time    In addition, we recommend that you review healthy lifestyles and methods for weight loss available through the National Institutes of Health patient information sites:  http://win.niddk.nih.gov/publications/index.htm    And look into health and wellness programs that may be available through your health insurance provider, employer, local community center, or dominick club.    Weight management plan: Patient was referred to their PCP to discuss a diet and exercise plan.     Your blood pressure was checked while you were in clinic today.  Please read the guidelines below about what these numbers mean and what you should do about them.  Your systolic blood pressure is the top number.  This is the pressure when the heart is pumping.  Your diastolic blood pressure is the bottom number.  This is the pressure in between beats.  If your systolic blood pressure is less than 120 and your diastolic blood pressure is less than 80, then your blood pressure is normal. There is nothing more that you need to do about it  If your systolic blood pressure is 120-139 or your diastolic blood pressure is 80-89, your blood pressure may be higher than it should be.  You should have your blood pressure re-checked within a year by a primary care provider.  If your systolic blood pressure is 140 or greater or your diastolic blood pressure is 90 or greater, you may have high blood pressure.  High blood pressure is treatable, but if left untreated over time it can put you at risk for heart attack, stroke, or kidney failure.  You should have your blood pressure re-checked by a primary care provider within the next four weeks.    Please, use CPAP every time you  sleep (including naps). Follow up with Dr Dobbins within 6 weeks. Call us if you have any concerns.    Thank you!    Manuel Anderson MD, MPH  Clinical Sleep and Occupational / Environmental Medicine

## 2017-09-12 NOTE — NURSING NOTE
"Chief Complaint   Patient presents with     RECHECK     Wants cpap       Initial /81  Pulse 60  Resp 12  Ht 1.651 m (5' 5\")  Wt 99.8 kg (220 lb)  SpO2 98%  BMI 36.61 kg/m2 Estimated body mass index is 36.61 kg/(m^2) as calculated from the following:    Height as of this encounter: 1.651 m (5' 5\").    Weight as of this encounter: 99.8 kg (220 lb).  Medication Reconciliation: incomplete       Aure Camara LPN/MIGUELANGEL  "

## 2017-09-12 NOTE — MR AVS SNAPSHOT
After Visit Summary   9/12/2017    Fausto Farr    MRN: [id number]           Patient Information     Date Of Birth          1940        Visit Information        Provider Department      9/12/2017 4:30 PM Manuel Anderson MD [facility]        Today's Diagnoses     GAGE (obstructive sleep apnea)    -  1    Hypoxemia          Care Instructions      Your BMI is Body mass index is 36.61 kg/(m^2).  Weight management is a personal decision.  If you are interested in exploring weight loss strategies, the following discussion covers the approaches that may be successful. Body mass index (BMI) is one way to tell whether you are at a healthy weight, overweight, or obese. It measures your weight in relation to your height.  A BMI of 18.5 to 24.9 is in the healthy range. A person with a BMI of 25 to 29.9 is considered overweight, and someone with a BMI of 30 or greater is considered obese. More than two-thirds of American adults are considered overweight or obese.  Being overweight or obese increases the risk for further weight gain. Excess weight may lead to heart disease and diabetes.  Creating and following plans for healthy eating and physical activity may help you improve your health.  Weight control is part of healthy lifestyle and includes exercise, emotional health, and healthy eating habits. Careful eating habits lifelong are the mainstay of weight control. Though there are significant health benefits from weight loss, long-term weight loss with diet alone may be very difficult to achieve- studies show long-term success with dietary management in less than 10% of people. Attaining a healthy weight may be especially difficult to achieve in those with severe obesity. In some cases, medications, devices and surgical management might be considered.  What can you do?  If you are overweight or obese and are interested in methods for weight loss, you should discuss this with your  provider.     Consider reducing daily calorie intake by 500 calories.     Keep a food journal.     Avoiding skipping meals, consider cutting portions instead.    Diet combined with exercise helps maintain muscle while optimizing fat loss. Strength training is particularly important for building and maintaining muscle mass. Exercise helps reduce stress, increase energy, and improves fitness. Increasing exercise without diet control, however, may not burn enough calories to loose weight.       Start walking three days a week 10-20 minutes at a time    Work towards walking thirty minutes five days a week     Eventually, increase the speed of your walking for 1-2 minutes at time    In addition, we recommend that you review healthy lifestyles and methods for weight loss available through the National Institutes of Health patient information sites:  http://win.niddk.nih.gov/publications/index.htm    And look into health and wellness programs that may be available through your health insurance provider, employer, local community center, or [employer]ick club.    Weight management plan: Patient was referred to their PCP to discuss a diet and exercise plan.     Your blood pressure was checked while you were in clinic today.  Please read the guidelines below about what these numbers mean and what you should do about them.  Your systolic blood pressure is the top number.  This is the pressure when the heart is pumping.  Your diastolic blood pressure is the bottom number.  This is the pressure in between beats.  If your systolic blood pressure is less than 120 and your diastolic blood pressure is less than 80, then your blood pressure is normal. There is nothing more that you need to do about it  If your systolic blood pressure is 120-139 or your diastolic blood pressure is 80-89, your blood pressure may be higher than it should be.  You should have your blood pressure re-checked within a year by a primary care provider.  If your  "systolic blood pressure is 140 or greater or your diastolic blood pressure is 90 or greater, you may have high blood pressure.  High blood pressure is treatable, but if left untreated over time it can put you at risk for heart attack, stroke, or kidney failure.  You should have your blood pressure re-checked by a primary care provider within the next four weeks.    Please, use CPAP every time you sleep (including naps). Follow up with Dr Dobbins within 6 weeks. Call us if you have any concerns.    Thank you!    Manuel Anderson MD, MPH  Clinical Sleep and Occupational / Environmental Medicine            Follow-ups after your visit        Your next 10 appointments already scheduled     Oct 05, 2017  8:15 AM CDT   Anticoagulation Visit with  ANT[facility]AGULATION CLINIC   [facility]    [address] [place] [address]  [address]  [facility] [address] [phone]              Who to contact     If you have questions or need follow up information about today's clinic visit or your schedule please contact [facility] directly at [phone].  Normal or non-critical lab and imaging results will be communicated to you by MyChart, letter or phone within 4 business days after the clinic has received the results. If you do not hear from us within 7 days, please contact the clinic through MyChart or phone. If you have a critical or abnormal lab result, we will notify you by phone as soon as possible.  Submit refill requests through [employer] or call your pharmacy and they will forward the refill request to us. Please allow 3 business days for your refill to be completed.          Additional Information About Your Visit        [facility] Information     [employer] lets you send messages to your doctor, view your test results, renew your prescriptions, schedule appointments and more. To sign up, go to [email or web address] . Click on \"Log in\" on the left side of the screen, " "which will take you to the Welcome page. Then click on \"Sign up Now\" on the right side of the page.     You will be asked to enter the access code listed below, as well as some personal information. Please follow the directions to create your username and password.     Your access code is: [email or web address]  Expires: 10/25/2017 10:33 AM     Your access code will  in 90 days. If you need help or a new code, please call your [facility] clinic or [phone].        Care EveryWhere ID     This is your Care EveryWhere ID. This could be used by other organizations to access your [facility] medical records  [id number]        Your Vitals Were     Pulse Respirations Height Pulse Oximetry BMI (Body Mass Index)       60 12 1.651 m (5' 5\") 98% 36.61 kg/m2        Blood Pressure from Last 3 Encounters:   17 131/81   08/15/17 108/68   17 90/60    Weight from Last 3 Encounters:   17 99.8 kg (220 lb)   08/15/17 101 kg (222 lb 11.2 oz)   17 100.2 kg (221 lb)              We Performed the Following     Comprehensive DME        Primary Care Provider Office Phone # Fax #    Tu Reyes [phone] [address] [place] [address]        Equal Access to Services     [facility]: Hadii ted garsia haddavido Sobridger, waaxda luqadaha, qaybta kaalcora rm, denny blake . So [facility] [phone].    ATENCIÓN: Si habla español, tiene a brown disposición servicios gratuitos de asistencia lingüística. Rebecca al [phone].    We comply with applicable federal civil rights laws and [place] laws. We do not discriminate on the basis of race, color, national origin, age, disability sex, sexual orientation or gender identity.            Thank you!     Thank you for choosing [facility]  for your care. Our goal is always to provide you with excellent care. Hearing back from our patients is one way we can continue to improve our " services. Please take a few minutes to complete the written survey that you may receive in the mail after your visit with us. Thank you!             Your Updated Medication List - Protect others around you: Learn how to safely use, store and throw away your medicines at www.disposemymeds.org.          This list is accurate as of: 9/12/17  5:09 PM.  Always use your most recent med list.                   Brand Name Dispense Instructions for use Diagnosis    ASPIRIN EC PO      Take 81 mg by mouth every evening        betamethasone valerate 0.1 % lotion    VALISONE    60 mL    APPLY TOPICALLY TWICE DAILY PRN    Eczema, unspecified type       fluocinonide 0.05 % ointment    LIDEX    60 g    2- 30 gram tubes.  Apply twice a day as needed.    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)       metoprolol 25 MG tablet    LOPRESSOR    180 tablet    Take 1 tablet (25 mg) by mouth 2 times daily    Coronary artery disease involving coronary bypass graft 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       OMEGA-3 FISH OIL PO      Take 2 g by mouth daily        rosuvastatin 40 MG tablet    CRESTOR    90 tablet    TAKE 1 TABLET BY MOUTH DAILY    Hyperlipidemia       tamsulosin 0.4 MG capsule    FLOMAX    90 capsule    TAKE 1 CAPSULE BY MOUTH EVERY DAY    Urinary retention       warfarin 5 MG tablet    COUMADIN    135 tablet    TAKE 1 AND 1/2 TABLETS BY MOUTH DAILY OR AS DIRECTED    Long-term (current) use of anticoagulants, S/P aortic valve replacement       ZETIA 10 MG tablet   Generic drug:  ezetimibe     90 tablet    TAKE 1 TABLET BY MOUTH DAILY    Mixed hyperlipidemia

## 2017-10-02 ENCOUNTER — DOCUMENTATION ONLY (OUTPATIENT)
Dept: SLEEP MEDICINE | Facility: CLINIC | Age: 77
End: 2017-10-02

## 2017-10-02 PROBLEM — G47.33 OSA (OBSTRUCTIVE SLEEP APNEA): Status: ACTIVE | Noted: 2017-10-02

## 2017-10-02 NOTE — PROGRESS NOTES
Patient was offered choice of vendor and chose [facility].  Patient Fausto Farr was set up at [place] on October 2, 2017. Patient received a Resmed AirSense 10 Auto. Pressures were set at 6-16 cm H2O.   Patient s ramp is 5 cm H2O for Auto and FLEX/EPR is EPR, 2.  Patient received a Resmed Mask name: airfit p10  Pillow mask Size Medium, heated tubing and heated humidifier.  Patient is enrolled in the STM Program and does need to meet compliance. Patient has a follow up on 11/3/2017 with Dr. Dobbins.    Barb Ladd

## 2017-10-05 ENCOUNTER — ANTICOAGULATION THERAPY VISIT (OUTPATIENT)
Dept: NURSING | Facility: CLINIC | Age: 77
End: 2017-10-05
Payer: MEDICARE

## 2017-10-05 DIAGNOSIS — Z79.01 LONG-TERM (CURRENT) USE OF ANTICOAGULANTS: ICD-10-CM

## 2017-10-05 DIAGNOSIS — I48.91 AF (ATRIAL FIBRILLATION) (H): ICD-10-CM

## 2017-10-05 DIAGNOSIS — Z95.2 S/P MITRAL VALVE REPLACEMENT: ICD-10-CM

## 2017-10-05 LAB — INR POINT OF CARE: 3.4 (ref 0.86–1.14)

## 2017-10-05 PROCEDURE — 36416 COLLJ CAPILLARY BLOOD SPEC: CPT

## 2017-10-05 PROCEDURE — 99207 ZZC NO CHARGE NURSE ONLY: CPT

## 2017-10-05 PROCEDURE — 85610 PROTHROMBIN TIME: CPT | Mod: QW

## 2017-10-05 NOTE — PROGRESS NOTES
ANTICOAGULATION FOLLOW-UP CLINIC VISIT    Patient Name:  Fausto Farr  Date:  10/5/2017  Contact Type:  Face to Face    SUBJECTIVE:     Patient Findings     Positives No Problem Findings           OBJECTIVE    INR Protime   Date Value Ref Range Status   10/05/2017 3.4 (A) 0.86 - 1.14 Final       ASSESSMENT / PLAN  INR assessment THER    Recheck INR In: 5 WEEKS    INR Location Clinic      Anticoagulation Summary as of 10/5/2017     INR goal 2.5-3.5   Today's INR 3.4   Maintenance plan 7.5 mg (5 mg x 1.5) every day   Full instructions 7.5 mg every day   Weekly total 52.5 mg   No change documented Caryn Campos, RN   Plan last modified Caryn Campos RN (9/7/2017)   Next INR check 11/9/2017   Priority INR   Target end date Indefinite    Indications   AF (atrial fibrillation) (H) [I48.91]  S/P mitral valve replacement [Z95.2]  Long-term (current) use of anticoagulants [Z79.01] [Z79.01]         Anticoagulation Episode Summary     INR check location     Preferred lab     Send INR reminders to [facility]    Comments 5mg tabs - andrade dose // transfer from [facility] // [facility] // CALENDAR      Anticoagulation Care Providers     Provider Role Specialty Phone number    Tu Reyes MD  Internal Medicine [phone]            See the Encounter Report to view Anticoagulation Flowsheet and Dosing Calendar (Go to Encounters tab in chart review, and find the Anticoagulation Therapy Visit)        Caryn Campos RN

## 2017-10-05 NOTE — MR AVS SNAPSHOT
Fausto Farr   10/5/2017 8:15 AM   Anticoagulation Therapy Visit    Description:  77 year old male   Provider:  [facility]   Department:  [facility]           INR as of 10/5/2017     Today's INR 3.4      Anticoagulation Summary as of 10/5/2017     INR goal 2.5-3.5   Today's INR 3.4   Full instructions 7.5 mg every day   Next INR check 11/9/2017    Indications   AF (atrial fibrillation) (H) [I48.91]  S/P mitral valve replacement [Z95.2]  Long-term (current) use of anticoagulants [Z79.01] [Z79.01]         Your next Anticoagulation Clinic appointment(s)     Nov 09, 2017  8:30 AM CST   Anticoagulation Visit with  ANTICOAGULATION CLINIC   [facility] ([facility])    [address] [place] [address]  [address]  [facility] [address] [phone]              Contact Numbers     [facility]  Please call  [phone] to cancel and/or reschedule your appointment   Please call  [phone] with any problems or questions regarding your therapy.        October 2017 Details    Sun Mon Tue Wed Thu Fri Sat     1               2               3               4               5      7.5 mg   See details      6      7.5 mg         7      7.5 mg           8      7.5 mg         9      7.5 mg         10      7.5 mg         11      7.5 mg         12      7.5 mg         13      7.5 mg         14      7.5 mg           15      7.5 mg         16      7.5 mg         17      7.5 mg         18      7.5 mg         19      7.5 mg         20      7.5 mg         21      7.5 mg           22      7.5 mg         23      7.5 mg         24      7.5 mg         25      7.5 mg         26      7.5 mg         27      7.5 mg         28      7.5 mg           29      7.5 mg         30      7.5 mg         31      7.5 mg              Date Details   10/05 This INR check               How to take your warfarin dose     To take:  7.5 mg Take 1.5 of the 5 mg tablets.           November 2017 Details    Sun Mon Tue Wed  Thu Fri Sat        1      7.5 mg         2      7.5 mg         3      7.5 mg         4      7.5 mg           5      7.5 mg         6      7.5 mg         7      7.5 mg         8      7.5 mg         9            10               11                 12               13               14               15               16               17               18                 19               20               21               22               23               24               25                 26               27               28               29               30                  Date Details   No additional details    Date of next INR:  11/9/2017         How to take your warfarin dose     To take:  7.5 mg Take 1.5 of the 5 mg tablets.

## 2017-10-06 ENCOUNTER — DOCUMENTATION ONLY (OUTPATIENT)
Dept: SLEEP MEDICINE | Facility: CLINIC | Age: 77
End: 2017-10-06

## 2017-10-06 NOTE — PROGRESS NOTES
3 DAY STM VISIT    Patient contacted for 3 day STM visit  Subjective measures:  Patient doing well he had questions about cleaning his supplies and his data.     Device type: Auto-CPAP  PAP settings: CPAP min 6 cm  H20     CPAP max 16 cm  H20       Assessment: Nightly usage over four hours. Instructed patient how to clean equipment and reviewed his data.   Action plan: Pt to have f/u 14 day STM visit.

## 2017-10-08 DIAGNOSIS — R33.9 URINARY RETENTION: ICD-10-CM

## 2017-10-09 RX ORDER — TAMSULOSIN HYDROCHLORIDE 0.4 MG/1
CAPSULE ORAL
Qty: 90 CAPSULE | Refills: 1 | Status: SHIPPED | OUTPATIENT
Start: 2017-10-09 | End: 2018-04-13

## 2017-10-09 NOTE — TELEPHONE ENCOUNTER
TAMSULOSIN 0.4MG CAPSULES     Last Written Prescription Date: 09/26/2016  Last Fill Quantity: 90, # refills: 3    Last Office Visit with G, [facility] or [facility] prescribing provider:  04/14/2017   Future Office Visit:      BP Readings from Last 3 Encounters:   09/12/17 131/81   08/15/17 108/68   07/27/17 90/60

## 2017-10-17 ENCOUNTER — DOCUMENTATION ONLY (OUTPATIENT)
Dept: SLEEP MEDICINE | Facility: CLINIC | Age: 77
End: 2017-10-17

## 2017-10-17 NOTE — PROGRESS NOTES
14 DAY STM VISIT    Subjective measures:   Patient doing well and feeling better. Patient mask leaks in the morning.     Assessment: Pt not meeting objective benchmarks for leak. Patient meeting subjective benchmarks.   Action plan: Pt to have 30 day STM visit.    Device type: Auto-CPAP  PAP settings: CPAP min 6 cm  H20     CPAP max 16 cm  H20    95th% pressure 9.5 cm  H20   Objective measures: 14 day rolling measures      Compliance  100 %      Leak  27.69 lpm  last  upload      AHI 1.19   last  upload      Average number of minutes 407        Objective measure goal  Compliance   Goal >70%  Leak   Goal < 24 lpm  AHI  Goal < 5  Usage  Goal >240

## 2017-10-29 DIAGNOSIS — E78.2 MIXED HYPERLIPIDEMIA: ICD-10-CM

## 2017-10-31 RX ORDER — EZETIMIBE 10 MG/1
TABLET ORAL
Qty: 90 TABLET | Refills: 1 | Status: SHIPPED | OUTPATIENT
Start: 2017-10-31 | End: 2018-04-28

## 2017-11-02 ENCOUNTER — DOCUMENTATION ONLY (OUTPATIENT)
Dept: SLEEP MEDICINE | Facility: CLINIC | Age: 77
End: 2017-11-02

## 2017-11-02 NOTE — PROGRESS NOTES
30 DAY [facility] VISIT    Message left for patient to return call     Assessment: Pt meeting objective benchmarks.     Action plan: Pt to have 6 month [facility] visit.  Patient has a follow up visit with Dr. Dobbins on 11/3/2017.   Device type: Auto-CPAP  PAP settings: CPAP min 6 cm  H20     CPAP max 16 cm  H20    95th% pressure 10.2 cm  H20   Objective measures: 14 day rolling measures      Compliance  100 %      Leak  28.16 lpm  last  upload      AHI 1.35   last  upload      Average number of minutes 443            Objective measure goal  Compliance   Goal >70%  Leak   Goal < 24 lpm  AHI  Goal < 5  Usage  Goal >240

## 2017-11-03 ENCOUNTER — OFFICE VISIT (OUTPATIENT)
Dept: SLEEP MEDICINE | Facility: CLINIC | Age: 77
End: 2017-11-03
Payer: MEDICARE

## 2017-11-03 VITALS
HEIGHT: 65 IN | HEART RATE: 63 BPM | DIASTOLIC BLOOD PRESSURE: 74 MMHG | RESPIRATION RATE: 15 BRPM | WEIGHT: 220 LBS | OXYGEN SATURATION: 97 % | BODY MASS INDEX: 36.65 KG/M2 | SYSTOLIC BLOOD PRESSURE: 127 MMHG

## 2017-11-03 DIAGNOSIS — G47.33 OSA (OBSTRUCTIVE SLEEP APNEA): Primary | ICD-10-CM

## 2017-11-03 PROCEDURE — 99214 OFFICE O/P EST MOD 30 MIN: CPT | Performed by: INTERNAL MEDICINE

## 2017-11-03 NOTE — NURSING NOTE
"Chief Complaint   Patient presents with     RECHECK     f/u cpap       Initial /74  Pulse 63  Resp 15  Ht 1.651 m (5' 5\")  Wt 99.8 kg (220 lb)  SpO2 97%  BMI 36.61 kg/m2 Estimated body mass index is 36.61 kg/(m^2) as calculated from the following:    Height as of this encounter: 1.651 m (5' 5\").    Weight as of this encounter: 99.8 kg (220 lb).  Medication Reconciliation: complete         Aure Camara LPN/MIGUELANGEL  "

## 2017-11-03 NOTE — PROGRESS NOTES
76 y/o male with moderate obstructive sleep apnea with hypoxemia in the setting of RV enlargement and prosthetic valves but normal LV function. He has not had subjective improvement though  He did not have baseline sleepiness.  He has been using the device on average of 7 hours a night > 100% of nights greater than 4 hours with residual apnea hypoxpnea index of less than 2 and no significant leak.      Assessment:.    moderate obstructive sleep apnea with hypoxemia effectively treated with CPAP.    Plan:    Return to clinic in 1 year.

## 2017-11-03 NOTE — MR AVS SNAPSHOT
After Visit Summary   11/3/2017    Fausto Farr    MRN: [id number]           Patient Information     Date Of Birth          1940        Visit Information        Provider Department      11/3/2017 4:00 PM Isrrael Dobbins MD [facility]        Today's Diagnoses     GAGE (obstructive sleep apnea)    -  1      Care Instructions      Your BMI is Body mass index is 36.61 kg/(m^2).  Weight management is a personal decision.  If you are interested in exploring weight loss strategies, the following discussion covers the approaches that may be successful. Body mass index (BMI) is one way to tell whether you are at a healthy weight, overweight, or obese. It measures your weight in relation to your height.  A BMI of 18.5 to 24.9 is in the healthy range. A person with a BMI of 25 to 29.9 is considered overweight, and someone with a BMI of 30 or greater is considered obese. More than two-thirds of American adults are considered overweight or obese.  Being overweight or obese increases the risk for further weight gain. Excess weight may lead to heart disease and diabetes.  Creating and following plans for healthy eating and physical activity may help you improve your health.  Weight control is part of healthy lifestyle and includes exercise, emotional health, and healthy eating habits. Careful eating habits lifelong are the mainstay of weight control. Though there are significant health benefits from weight loss, long-term weight loss with diet alone may be very difficult to achieve- studies show long-term success with dietary management in less than 10% of people. Attaining a healthy weight may be especially difficult to achieve in those with severe obesity. In some cases, medications, devices and surgical management might be considered.  What can you do?  If you are overweight or obese and are interested in methods for weight loss, you should discuss this with your provider.      Consider reducing daily calorie intake by 500 calories.     Keep a food journal.     Avoiding skipping meals, consider cutting portions instead.    Diet combined with exercise helps maintain muscle while optimizing fat loss. Strength training is particularly important for building and maintaining muscle mass. Exercise helps reduce stress, increase energy, and improves fitness. Increasing exercise without diet control, however, may not burn enough calories to loose weight.       Start walking three days a week 10-20 minutes at a time    Work towards walking thirty minutes five days a week     Eventually, increase the speed of your walking for 1-2 minutes at time    In addition, we recommend that you review healthy lifestyles and methods for weight loss available through the National Institutes of Health patient information sites:  http://win.niddk.nih.gov/publications/index.htm    And look into health and wellness programs that may be available through your health insurance provider, employer, local community center, or dominick club.    Weight management plan: Patient was referred to their PCP to discuss a diet and exercise plan.     Your blood pressure was checked while you were in clinic today.  Please read the guidelines below about what these numbers mean and what you should do about them.  Your systolic blood pressure is the top number.  This is the pressure when the heart is pumping.  Your diastolic blood pressure is the bottom number.  This is the pressure in between beats.  If your systolic blood pressure is less than 120 and your diastolic blood pressure is less than 80, then your blood pressure is normal. There is nothing more that you need to do about it  If your systolic blood pressure is 120-139 or your diastolic blood pressure is 80-89, your blood pressure may be higher than it should be.  You should have your blood pressure re-checked within a year by a primary care provider.  If your systolic blood  pressure is 140 or greater or your diastolic blood pressure is 90 or greater, you may have high blood pressure.  High blood pressure is treatable, but if left untreated over time it can put you at risk for heart attack, stroke, or kidney failure.  You should have your blood pressure re-checked by a primary care provider within the next four weeks.              Follow-ups after your visit        Follow-up notes from your care team     Return in about 1 year (around 11/3/2018).      Your next 10 appointments already scheduled     Nov 09, 2017  8:30 AM CST   Anticoagulation Visit with  ANTICOAGULATION CLINIC   [facility])    [address]  [address]  [place] [address]   [phone]            Oct 19, 2018  9:00 AM CDT   Return Sleep Patient with Isrrael Dobbins MD   [facility] ([facility])    [address]  [place] [address] [phone]              Who to contact     If you have questions or need follow up information about today's clinic visit or your schedule please contact [facility] directly at [phone].  Normal or non-critical lab and imaging results will be communicated to you by MyChart, letter or phone within 4 business days after the clinic has received the results. If you do not hear from us within 7 days, please contact the clinic through MyChart or phone. If you have a critical or abnormal lab result, we will notify you by phone as soon as possible.  Submit refill requests through [employer] or call your pharmacy and they will forward the refill request to us. Please allow 3 business days for your refill to be completed.          Additional Information About Your Visit        Maiyethart Information     [employer] lets you send messages to your doctor, view your test results, renew your prescriptions, schedule appointments and more. To sign up, go to  [email or web address] . Click on \"Log in\" on the left side of the screen, which will take you to the Welcome page. Then click on \"Sign up Now\" on the right side of the page.     You will be asked to enter the access code listed below, as well as some personal information. Please follow the directions to create your username and password.     Your access code is: [email or web address]  Expires: 2018 11:54 AM     Your access code will  in 90 days. If you need help or a new code, please call your [place] clinic or [phone].        Care EveryWhere ID     This is your Care EveryWhere ID. This could be used by other organizations to access your [place] medical records  [id number]        Your Vitals Were     Pulse Respirations Height Pulse Oximetry BMI (Body Mass Index)       63 15 1.651 m (5' 5\") 97% 36.61 kg/m2        Blood Pressure from Last 3 Encounters:   17 127/74   17 131/81   08/15/17 108/68    Weight from Last 3 Encounters:   17 99.8 kg (220 lb)   17 99.8 kg (220 lb)   08/15/17 101 kg (222 lb 11.2 oz)              Today, you had the following     No orders found for display       Primary Care Provider Office Phone # Fax #    Tu Reyes [phone] [address] [place] [address]        Equal Access to Services     [facility] Hospital[facility] AH: Hadii aad ku hadasho Soomaali, waaxda luqadaha, qaybta kaalmada adeegyada, denny blake . So [facility] [phone].    ATENCIÓN: Si habla español, tiene a brown disposición servicios gratuitos de asistencia lingüística. Llame al [phone].    We comply with applicable federal civil rights laws and [place] laws. We do not discriminate on the basis of race, color, national origin, age, disability, sex, sexual orientation, or gender identity.            Thank you!     Thank you for choosing [facility]  for your care. Our goal is always to provide you with excellent " care. Hearing back from our patients is one way we can continue to improve our services. Please take a few minutes to complete the written survey that you may receive in the mail after your visit with us. Thank you!             Your Updated Medication List - Protect others around you: Learn how to safely use, store and throw away your medicines at www.disposemymeds.org.          This list is accurate as of: 11/3/17 11:59 PM.  Always use your most recent med list.                   Brand Name Dispense Instructions for use Diagnosis    ASPIRIN EC PO      Take 81 mg by mouth every evening        betamethasone valerate 0.1 % lotion    VALISONE    60 mL    APPLY TOPICALLY TWICE DAILY PRN    Eczema, unspecified type       ezetimibe 10 MG tablet    ZETIA    90 tablet    TAKE 1 TABLET BY MOUTH DAILY    Mixed hyperlipidemia       fluocinonide 0.05 % ointment    LIDEX    60 g    2- 30 gram tubes.  Apply twice a day as needed.    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)       metoprolol 25 MG tablet    LOPRESSOR    180 tablet    Take 1 tablet (25 mg) by mouth 2 times daily    Coronary artery disease involving coronary bypass graft 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       OMEGA-3 FISH OIL PO      Take 2 g by mouth daily        rosuvastatin 40 MG tablet    CRESTOR    90 tablet    TAKE 1 TABLET BY MOUTH DAILY    Hyperlipidemia       tamsulosin 0.4 MG capsule    FLOMAX    90 capsule    TAKE 1 CAPSULE BY MOUTH EVERY DAY    Urinary retention       warfarin 5 MG tablet    COUMADIN    135 tablet    TAKE 1 AND 1/2 TABLETS BY MOUTH DAILY OR AS DIRECTED    Long-term (current) use of anticoagulants, S/P aortic valve replacement

## 2017-11-03 NOTE — PATIENT INSTRUCTIONS
Your BMI is Body mass index is 36.61 kg/(m^2).  Weight management is a personal decision.  If you are interested in exploring weight loss strategies, the following discussion covers the approaches that may be successful. Body mass index (BMI) is one way to tell whether you are at a healthy weight, overweight, or obese. It measures your weight in relation to your height.  A BMI of 18.5 to 24.9 is in the healthy range. A person with a BMI of 25 to 29.9 is considered overweight, and someone with a BMI of 30 or greater is considered obese. More than two-thirds of American adults are considered overweight or obese.  Being overweight or obese increases the risk for further weight gain. Excess weight may lead to heart disease and diabetes.  Creating and following plans for healthy eating and physical activity may help you improve your health.  Weight control is part of healthy lifestyle and includes exercise, emotional health, and healthy eating habits. Careful eating habits lifelong are the mainstay of weight control. Though there are significant health benefits from weight loss, long-term weight loss with diet alone may be very difficult to achieve- studies show long-term success with dietary management in less than 10% of people. Attaining a healthy weight may be especially difficult to achieve in those with severe obesity. In some cases, medications, devices and surgical management might be considered.  What can you do?  If you are overweight or obese and are interested in methods for weight loss, you should discuss this with your provider.     Consider reducing daily calorie intake by 500 calories.     Keep a food journal.     Avoiding skipping meals, consider cutting portions instead.    Diet combined with exercise helps maintain muscle while optimizing fat loss. Strength training is particularly important for building and maintaining muscle mass. Exercise helps reduce stress, increase energy, and improves fitness.  Increasing exercise without diet control, however, may not burn enough calories to loose weight.       Start walking three days a week 10-20 minutes at a time    Work towards walking thirty minutes five days a week     Eventually, increase the speed of your walking for 1-2 minutes at time    In addition, we recommend that you review healthy lifestyles and methods for weight loss available through the National Institutes of Health patient information sites:  http://win.niddk.nih.gov/publications/index.htm    And look into health and wellness programs that may be available through your health insurance provider, employer, local community center, or dominick club.    Weight management plan: Patient was referred to their PCP to discuss a diet and exercise plan.     Your blood pressure was checked while you were in clinic today.  Please read the guidelines below about what these numbers mean and what you should do about them.  Your systolic blood pressure is the top number.  This is the pressure when the heart is pumping.  Your diastolic blood pressure is the bottom number.  This is the pressure in between beats.  If your systolic blood pressure is less than 120 and your diastolic blood pressure is less than 80, then your blood pressure is normal. There is nothing more that you need to do about it  If your systolic blood pressure is 120-139 or your diastolic blood pressure is 80-89, your blood pressure may be higher than it should be.  You should have your blood pressure re-checked within a year by a primary care provider.  If your systolic blood pressure is 140 or greater or your diastolic blood pressure is 90 or greater, you may have high blood pressure.  High blood pressure is treatable, but if left untreated over time it can put you at risk for heart attack, stroke, or kidney failure.  You should have your blood pressure re-checked by a primary care provider within the next four weeks.

## 2017-11-09 ENCOUNTER — ANTICOAGULATION THERAPY VISIT (OUTPATIENT)
Dept: NURSING | Facility: CLINIC | Age: 77
End: 2017-11-09
Payer: MEDICARE

## 2017-11-09 DIAGNOSIS — Z95.2 S/P MITRAL VALVE REPLACEMENT: ICD-10-CM

## 2017-11-09 DIAGNOSIS — Z79.01 LONG-TERM (CURRENT) USE OF ANTICOAGULANTS: ICD-10-CM

## 2017-11-09 DIAGNOSIS — I48.91 AF (ATRIAL FIBRILLATION) (H): ICD-10-CM

## 2017-11-09 LAB — INR POINT OF CARE: 4.2 (ref 0.86–1.14)

## 2017-11-09 PROCEDURE — 85610 PROTHROMBIN TIME: CPT | Mod: QW

## 2017-11-09 PROCEDURE — 99207 ZZC NO CHARGE NURSE ONLY: CPT

## 2017-11-09 PROCEDURE — 36416 COLLJ CAPILLARY BLOOD SPEC: CPT

## 2017-11-09 NOTE — MR AVS SNAPSHOT
Fausto Farr   11/9/2017 8:30 AM   Anticoagulation Therapy Visit    Description:  77 year old male   Provider:  [facility]   Department:  [facility]           INR as of 11/9/2017     Today's INR 4.2!      Anticoagulation Summary as of 11/9/2017     INR goal 2.5-3.5   Today's INR 4.2!   Full instructions 11/9: 2.5 mg; Otherwise 5 mg on Mon; 7.5 mg all other days   Next INR check 12/7/2017    Indications   AF (atrial fibrillation) (H) [I48.91]  S/P mitral valve replacement [Z95.2]  Long-term (current) use of anticoagulants [Z79.01] [Z79.01]         Your next Anticoagulation Clinic appointment(s)     Dec 07, 2017  8:00 AM CST   Anticoagulation Visit with  ANTICOAGULATION CLINIC   [facility] ([facility])    [address]  [address]  [facility] [facility] [id number] [phone]              Contact Numbers     [facility]  Please call  [phone] to cancel and/or reschedule your appointment   Please call  [phone] with any problems or questions regarding your therapy.        November 2017 Details    Sun Mon Tue Wed Thu Fri Sat        1               2               3               4                 5               6               7               8               9      2.5 mg   See details      10      7.5 mg         11      7.5 mg           12      7.5 mg         13      5 mg         14      7.5 mg         15      7.5 mg         16      7.5 mg         17      7.5 mg         18      7.5 mg           19      7.5 mg         20      5 mg         21      7.5 mg         22      7.5 mg         23      7.5 mg         24      7.5 mg         25      7.5 mg           26      7.5 mg         27      5 mg         28      7.5 mg         29      7.5 mg         30      7.5 mg            Date Details   11/09 This INR check               How to take your warfarin dose     To take:  2.5 mg Take 0.5 of a 5 mg tablet.    To take:  5 mg Take 1 of the 5 mg tablets.    To take:  7.5  mg Take 1.5 of the 5 mg tablets.           December 2017 Details    Sun Mon Tue Wed Thu Fri Sat          1      7.5 mg         2      7.5 mg           3      7.5 mg         4      5 mg         5      7.5 mg         6      7.5 mg         7            8               9                 10               11               12               13               14               15               16                 17               18               19               20               21               22               23                 24               25               26               27               28               29               30                 31                      Date Details   No additional details    Date of next INR:  12/7/2017         How to take your warfarin dose     To take:  5 mg Take 1 of the 5 mg tablets.    To take:  7.5 mg Take 1.5 of the 5 mg tablets.

## 2017-11-10 NOTE — PROGRESS NOTES
ANTICOAGULATION FOLLOW-UP CLINIC VISIT    Patient Name:  Fausto Farr  Date:  11/9/2017  Contact Type:  Face to Face    SUBJECTIVE:     Patient Findings     Positives No Problem Findings, Unexplained INR or factor level change    Comments Going out of town 11/25/17 to 12/5/17.           OBJECTIVE    INR Protime   Date Value Ref Range Status   11/09/2017 4.2 (A) 0.86 - 1.14 Final       ASSESSMENT / PLAN  INR assessment SUPRA    Recheck INR In: 4 WEEKS    INR Location Clinic      Anticoagulation Summary as of 11/9/2017     INR goal 2.5-3.5   Today's INR 4.2!   Maintenance plan 5 mg (5 mg x 1) on Mon; 7.5 mg (5 mg x 1.5) all other days   Full instructions 11/9: 2.5 mg; Otherwise 5 mg on Mon; 7.5 mg all other days   Weekly total 50 mg   Plan last modified Caryn Campos RN (11/9/2017)   Next INR check 12/7/2017   Priority INR   Target end date Indefinite    Indications   AF (atrial fibrillation) (H) [I48.91]  S/P mitral valve replacement [Z95.2]  Long-term (current) use of anticoagulants [Z79.01] [Z79.01]         Anticoagulation Episode Summary     INR check location     Preferred lab     Send INR reminders to [facility]    Comments 5mg tabs - andrade dose // transfer from [facility] // [facility] // CALENDAR      Anticoagulation Care Providers     Provider Role Specialty Phone number    Tu Reyes MD  Internal Medicine [phone]            See the Encounter Report to view Anticoagulation Flowsheet and Dosing Calendar (Go to Encounters tab in chart review, and find the Anticoagulation Therapy Visit)        Caryn Campos RN

## 2017-11-26 DIAGNOSIS — Z79.01 LONG-TERM (CURRENT) USE OF ANTICOAGULANTS: ICD-10-CM

## 2017-11-26 DIAGNOSIS — Z95.2 S/P AORTIC VALVE REPLACEMENT: ICD-10-CM

## 2017-11-27 RX ORDER — WARFARIN SODIUM 5 MG/1
TABLET ORAL
Qty: 135 TABLET | Refills: 0 | Status: SHIPPED | OUTPATIENT
Start: 2017-11-27 | End: 2018-03-08

## 2017-12-07 ENCOUNTER — ANTICOAGULATION THERAPY VISIT (OUTPATIENT)
Dept: NURSING | Facility: CLINIC | Age: 77
End: 2017-12-07
Payer: MEDICARE

## 2017-12-07 DIAGNOSIS — Z95.2 S/P MITRAL VALVE REPLACEMENT: ICD-10-CM

## 2017-12-07 DIAGNOSIS — Z79.01 LONG-TERM (CURRENT) USE OF ANTICOAGULANTS: ICD-10-CM

## 2017-12-07 DIAGNOSIS — I48.91 AF (ATRIAL FIBRILLATION) (H): ICD-10-CM

## 2017-12-07 LAB — INR POINT OF CARE: 2.9 (ref 0.86–1.14)

## 2017-12-07 PROCEDURE — 99207 ZZC NO CHARGE NURSE ONLY: CPT

## 2017-12-07 PROCEDURE — 85610 PROTHROMBIN TIME: CPT | Mod: QW

## 2017-12-07 PROCEDURE — 36416 COLLJ CAPILLARY BLOOD SPEC: CPT

## 2017-12-07 NOTE — MR AVS SNAPSHOT
Fausto Carson Yordan   12/7/2017 8:00 AM   Anticoagulation Therapy Visit    Description:  77 year old male   Provider:  [facility]   Department:  [facility]           INR as of 12/7/2017     Today's INR 2.9      Anticoagulation Summary as of 12/7/2017     INR goal 2.5-3.5   Today's INR 2.9   Full instructions 5 mg on Mon; 7.5 mg all other days   Next INR check 1/4/2018    Indications   AF (atrial fibrillation) (H) [I48.91]  S/P mitral valve replacement [Z95.2]  Long-term (current) use of anticoagulants [Z79.01] [Z79.01]         Your next Anticoagulation Clinic appointment(s)     Jan 04, 2018  8:30 AM CST   Anticoagulation Visit with  ANTICOAGULATION CLINIC   [facility] ([facility])    [address] [place] [address]  [address]  [facility] [address] [phone]              Contact Numbers     [facility]  Please call  [phone] to cancel and/or reschedule your appointment   Please call  [phone] with any problems or questions regarding your therapy.        December 2017 Details    Sun Mon Tue Wed Thu Fri Sat          1               2                 3               4               5               6               7      7.5 mg   See details      8      7.5 mg         9      7.5 mg           10      7.5 mg         11      5 mg         12      7.5 mg         13      7.5 mg         14      7.5 mg         15      7.5 mg         16      7.5 mg           17      7.5 mg         18      5 mg         19      7.5 mg         20      7.5 mg         21      7.5 mg         22      7.5 mg         23      7.5 mg           24      7.5 mg         25      5 mg         26      7.5 mg         27      7.5 mg         28      7.5 mg         29      7.5 mg         30      7.5 mg           31      7.5 mg                Date Details   12/07 This INR check               How to take your warfarin dose     To take:  5 mg Take 1 of the 5 mg tablets.    To take:  7.5 mg Take 1.5 of the 5 mg  tablets.           January 2018 Details    Sun Mon Tue Wed Thu Fri Sat      1      5 mg         2      7.5 mg         3      7.5 mg         4            5               6                 7               8               9               10               11               12               13                 14               15               16               17               18               19               20                 21               22               23               24               25               26               27                 28               29               30               31                   Date Details   No additional details    Date of next INR:  1/4/2018         How to take your warfarin dose     To take:  5 mg Take 1 of the 5 mg tablets.    To take:  7.5 mg Take 1.5 of the 5 mg tablets.

## 2017-12-07 NOTE — PROGRESS NOTES
ANTICOAGULATION FOLLOW-UP CLINIC VISIT    Patient Name:  Fausto Farr  Date:  12/7/2017  Contact Type:  Face to Face    SUBJECTIVE:     Patient Findings     Positives Other complaints, No Problem Findings    Comments Has been getting cramps in both legs on and off.  Encouraged him to drink more water.           OBJECTIVE    INR Protime   Date Value Ref Range Status   12/07/2017 2.9 (A) 0.86 - 1.14 Final       ASSESSMENT / PLAN  INR assessment THER    Recheck INR In: 4 WEEKS    INR Location Clinic      Anticoagulation Summary as of 12/7/2017     INR goal 2.5-3.5   Today's INR 2.9   Maintenance plan 5 mg (5 mg x 1) on Mon; 7.5 mg (5 mg x 1.5) all other days   Full instructions 5 mg on Mon; 7.5 mg all other days   Weekly total 50 mg   No change documented Caryn Campos RN   Plan last modified Caryn Campos RN (11/9/2017)   Next INR check 1/4/2018   Priority INR   Target end date Indefinite    Indications   AF (atrial fibrillation) (H) [I48.91]  S/P mitral valve replacement [Z95.2]  Long-term (current) use of anticoagulants [Z79.01] [Z79.01]         Anticoagulation Episode Summary     INR check location     Preferred lab     Send INR reminders to [facility]    Comments 5mg tabs - andrade dose // transfer from [facility] // [facility] // CALENDAR      Anticoagulation Care Providers     Provider Role Specialty Phone number    Tu Reyes MD  Internal Medicine [phone]            See the Encounter Report to view Anticoagulation Flowsheet and Dosing Calendar (Go to Encounters tab in chart review, and find the Anticoagulation Therapy Visit)        Caryn Campos RN

## 2018-01-04 ENCOUNTER — ANTICOAGULATION THERAPY VISIT (OUTPATIENT)
Dept: NURSING | Facility: CLINIC | Age: 78
End: 2018-01-04
Payer: MEDICARE

## 2018-01-04 DIAGNOSIS — I48.91 AF (ATRIAL FIBRILLATION) (H): ICD-10-CM

## 2018-01-04 DIAGNOSIS — Z95.2 S/P MITRAL VALVE REPLACEMENT: ICD-10-CM

## 2018-01-04 DIAGNOSIS — Z79.01 LONG-TERM (CURRENT) USE OF ANTICOAGULANTS: ICD-10-CM

## 2018-01-04 LAB — INR POINT OF CARE: 3 (ref 0.86–1.14)

## 2018-01-04 PROCEDURE — 85610 PROTHROMBIN TIME: CPT | Mod: QW

## 2018-01-04 PROCEDURE — 99207 ZZC NO CHARGE NURSE ONLY: CPT

## 2018-01-04 PROCEDURE — 36416 COLLJ CAPILLARY BLOOD SPEC: CPT

## 2018-01-04 NOTE — PROGRESS NOTES
ANTICOAGULATION FOLLOW-UP CLINIC VISIT    Patient Name:  Fausto Farr  Date:  1/4/2018  Contact Type:  Face to Face    SUBJECTIVE:     Patient Findings     Positives No Problem Findings           OBJECTIVE    INR Protime   Date Value Ref Range Status   01/04/2018 3.0 (A) 0.86 - 1.14 Final       ASSESSMENT / PLAN  INR assessment THER    Recheck INR In: 5 WEEKS    INR Location Clinic      Anticoagulation Summary as of 1/4/2018     INR goal 2.5-3.5   Today's INR 3.0   Maintenance plan 5 mg (5 mg x 1) on Mon; 7.5 mg (5 mg x 1.5) all other days   Full instructions 5 mg on Mon; 7.5 mg all other days   Weekly total 50 mg   No change documented Caryn Campos RN   Plan last modified Caryn Campos RN (11/9/2017)   Next INR check 2/8/2018   Priority INR   Target end date Indefinite    Indications   AF (atrial fibrillation) (H) [I48.91]  S/P mitral valve replacement [Z95.2]  Long-term (current) use of anticoagulants [Z79.01] [Z79.01]         Anticoagulation Episode Summary     INR check location     Preferred lab     Send INR reminders to [facility]    Comments 5mg tabs - andrade dose // transfer from [facility] // [facility] // CALENDAR      Anticoagulation Care Providers     Provider Role Specialty Phone number    Tu Reyes MD  Internal Medicine [phone]            See the Encounter Report to view Anticoagulation Flowsheet and Dosing Calendar (Go to Encounters tab in chart review, and find the Anticoagulation Therapy Visit)        Caryn Campos RN

## 2018-01-04 NOTE — MR AVS SNAPSHOT
Fausto Carson Yordan   1/4/2018 8:30 AM   Anticoagulation Therapy Visit    Description:  77 year old male   Provider:  [facility]   Department:  [facility]           INR as of 1/4/2018     Today's INR 3.0      Anticoagulation Summary as of 1/4/2018     INR goal 2.5-3.5   Today's INR 3.0   Full instructions 5 mg on Mon; 7.5 mg all other days   Next INR check 2/8/2018    Indications   AF (atrial fibrillation) (H) [I48.91]  S/P mitral valve replacement [Z95.2]  Long-term (current) use of anticoagulants [Z79.01] [Z79.01]         Your next Anticoagulation Clinic appointment(s)     Feb 08, 2018  8:30 AM CST   Anticoagulation Visit with  ANTICOAGULATION CLINIC   [facility] [place] ([facility])    [address] [place] [address]  [address]  [facility] [address] [phone]              Contact Numbers     [facility]  Please call  [phone] to cancel and/or reschedule your appointment   Please call  [phone] with any problems or questions regarding your therapy.        January 2018 Details    Sun Mon Tue Wed Thu Fri Sat      1               2               3               4      7.5 mg   See details      5      7.5 mg         6      7.5 mg           7      7.5 mg         8      5 mg         9      7.5 mg         10      7.5 mg         11      7.5 mg         12      7.5 mg         13      7.5 mg           14      7.5 mg         15      5 mg         16      7.5 mg         17      7.5 mg         18      7.5 mg         19      7.5 mg         20      7.5 mg           21      7.5 mg         22      5 mg         23      7.5 mg         24      7.5 mg         25      7.5 mg         26      7.5 mg         27      7.5 mg           28      7.5 mg         29      5 mg         30      7.5 mg         31      7.5 mg             Date Details   01/04 This INR check               How to take your warfarin dose     To take:  5 mg Take 1 of the 5 mg tablets.    To take:  7.5 mg Take 1.5 of the 5 mg  tablets.           February 2018 Details    Sun Mon Tue Wed Thu Fri Sat         1      7.5 mg         2      7.5 mg         3      7.5 mg           4      7.5 mg         5      5 mg         6      7.5 mg         7      7.5 mg         8            9               10                 11               12               13               14               15               16               17                 18               19               20               21               22               23               24                 25               26               27               28                   Date Details   No additional details    Date of next INR:  2/8/2018         How to take your warfarin dose     To take:  5 mg Take 1 of the 5 mg tablets.    To take:  7.5 mg Take 1.5 of the 5 mg tablets.

## 2018-02-05 ENCOUNTER — TRANSFERRED RECORDS (OUTPATIENT)
Dept: HEALTH INFORMATION MANAGEMENT | Facility: CLINIC | Age: 78
End: 2018-02-05

## 2018-02-05 DIAGNOSIS — I50.32 CHRONIC DIASTOLIC CONGESTIVE HEART FAILURE (H): ICD-10-CM

## 2018-02-05 RX ORDER — FUROSEMIDE 40 MG
20 TABLET ORAL DAILY
Qty: 45 TABLET | Refills: 3 | Status: SHIPPED | OUTPATIENT
Start: 2018-02-05 | End: 2018-05-08

## 2018-02-12 ENCOUNTER — ANTICOAGULATION THERAPY VISIT (OUTPATIENT)
Dept: NURSING | Facility: CLINIC | Age: 78
End: 2018-02-12
Payer: MEDICARE

## 2018-02-12 DIAGNOSIS — I48.91 AF (ATRIAL FIBRILLATION) (H): ICD-10-CM

## 2018-02-12 DIAGNOSIS — Z95.2 S/P MITRAL VALVE REPLACEMENT: ICD-10-CM

## 2018-02-12 DIAGNOSIS — E78.5 HYPERLIPIDEMIA: ICD-10-CM

## 2018-02-12 DIAGNOSIS — Z79.01 LONG-TERM (CURRENT) USE OF ANTICOAGULANTS: ICD-10-CM

## 2018-02-12 LAB — INR POINT OF CARE: 2.6 (ref 0.86–1.14)

## 2018-02-12 PROCEDURE — 36416 COLLJ CAPILLARY BLOOD SPEC: CPT

## 2018-02-12 PROCEDURE — 85610 PROTHROMBIN TIME: CPT | Mod: QW

## 2018-02-12 PROCEDURE — 99207 ZZC NO CHARGE NURSE ONLY: CPT

## 2018-02-12 NOTE — MR AVS SNAPSHOT
Fausto Farr   2/12/2018 9:30 AM   Anticoagulation Therapy Visit    Description:  77 year old male   Provider:  [facility]   Department:  [facility]           INR as of 2/12/2018     Today's INR 2.6      Anticoagulation Summary as of 2/12/2018     INR goal 2.5-3.5   Today's INR 2.6   Full instructions 5 mg on Mon; 7.5 mg all other days   Next INR check 3/19/2018    Indications   AF (atrial fibrillation) (H) [I48.91]  S/P mitral valve replacement [Z95.2]  Long-term (current) use of anticoagulants [Z79.01] [Z79.01]         Your next Anticoagulation Clinic appointment(s)     Mar 19, 2018  9:30 AM CDT   Anticoagulation Visit with  ANTICOAGULATION CLINIC   [facility] [place] ([facility])    [address] [place] [address]  [address]  [facility] [address] [phone]              Contact Numbers     [facility]  Please call  [phone] to cancel and/or reschedule your appointment   Please call  [phone] with any problems or questions regarding your therapy.        February 2018 Details    Sun Mon Tue Wed Thu Fri Sat         1               2               3                 4               5               6               7               8               9               10                 11               12      5 mg   See details      13      7.5 mg         14      7.5 mg         15      7.5 mg         16      7.5 mg         17      7.5 mg           18      7.5 mg         19      5 mg         20      7.5 mg         21      7.5 mg         22      7.5 mg         23      7.5 mg         24      7.5 mg           25      7.5 mg         26      5 mg         27      7.5 mg         28      7.5 mg             Date Details   02/12 This INR check               How to take your warfarin dose     To take:  5 mg Take 1 of the 5 mg tablets.    To take:  7.5 mg Take 1.5 of the 5 mg tablets.           March 2018 Details    Sun Mon Tue Wed Thu Fri Sat         1      7.5 mg         2      7.5  mg         3      7.5 mg           4      7.5 mg         5      5 mg         6      7.5 mg         7      7.5 mg         8      7.5 mg         9      7.5 mg         10      7.5 mg           11      7.5 mg         12      5 mg         13      7.5 mg         14      7.5 mg         15      7.5 mg         16      7.5 mg         17      7.5 mg           18      7.5 mg         19            20               21               22               23               24                 25               26               27               28               29               30               31                Date Details   No additional details    Date of next INR:  3/19/2018         How to take your warfarin dose     To take:  5 mg Take 1 of the 5 mg tablets.    To take:  7.5 mg Take 1.5 of the 5 mg tablets.

## 2018-02-13 RX ORDER — ROSUVASTATIN CALCIUM 40 MG/1
TABLET, COATED ORAL
Qty: 90 TABLET | Refills: 0 | Status: SHIPPED | OUTPATIENT
Start: 2018-02-13 | End: 2018-05-08

## 2018-02-13 NOTE — TELEPHONE ENCOUNTER
"Requested Prescriptions   Pending Prescriptions Disp Refills     rosuvastatin (CRESTOR) 40 MG tablet [Pharmacy Med Name: ROSUVASTATIN CALCIUM 40MGTABLETS]  Last Written Prescription Date:  5/25/2017  Last Fill Quantity: 90 tablet,  # refills: 2   Last Office Visit  4/14/2017        with  [facility], [facility] or [facility] prescribing provider:     Future Office Visit:    90 tablet 0     Sig: TAKE 1 TABLET BY MOUTH DAILY    Statins Protocol Passed    2/12/2018  8:44 AM       Passed - LDL on file in past 12 months    Recent Labs   Lab Test  04/25/17   1244   LDL  57          Passed - No abnormal creatine kinase in past 12 months    No lab results found.       Passed - Recent or future visit with authorizing provider    Patient had office visit in the last year or has a visit in the next 30 days with authorizing provider.  See \"Patient Info\" tab in inbasket, or \"Choose Columns\" in Meds & Orders section of the refill encounter.        Passed - Patient is age 18 or older          "

## 2018-03-08 DIAGNOSIS — Z95.2 S/P AORTIC VALVE REPLACEMENT: ICD-10-CM

## 2018-03-08 DIAGNOSIS — Z79.01 LONG-TERM (CURRENT) USE OF ANTICOAGULANTS: ICD-10-CM

## 2018-03-08 NOTE — TELEPHONE ENCOUNTER
"Requested Prescriptions   Pending Prescriptions Disp Refills     warfarin (COUMADIN) 5 MG tablet [Pharmacy Med Name: WARFARIN SOD 5MG TABLETS (PEACH)]    Last Written Prescription Date:  11/27/2017  Last Fill Quantity: 135,  # refills: 0   Last office visit: 4/25/2017 with prescribing provider:  Tu Reyes     Future Office Visit:     135 tablet 0     Sig: TAKE 1 1/2 TABLET BY MOUTH DAILY OR AS DIRECTED.    Vitamin K Antagonists Failed    3/8/2018  8:56 AM       Failed - INR is within goal in the past 6 weeks    Confirm INR is within goal in the past 6 weeks.     Recent Labs   Lab Test 02/12/18   INR  2.6*                      Passed - Recent (12 mo) or future (30 days) visit within the authorizing provider's specialty    Patient had office visit in the last year or has a visit in the next 30 days with authorizing provider.  See \"Patient Info\" tab in inbasket, or \"Choose Columns\" in Meds & Orders section of the refill encounter.            Passed - Patient is 18 years of age or older          "

## 2018-03-09 RX ORDER — WARFARIN SODIUM 5 MG/1
TABLET ORAL
Qty: 135 TABLET | Refills: 1 | Status: SHIPPED | OUTPATIENT
Start: 2018-03-09 | End: 2018-05-08

## 2018-03-09 NOTE — TELEPHONE ENCOUNTER
Lab Results   Component Value Date    INR 2.6 02/12/2018    INR 3.0 01/04/2018    INR 2.36 11/07/2015    INR 2.05 11/06/2015     Refilled per nurse protocol standing orders.  Caryn Campos RN

## 2018-03-19 ENCOUNTER — ANTICOAGULATION THERAPY VISIT (OUTPATIENT)
Dept: NURSING | Facility: CLINIC | Age: 78
End: 2018-03-19
Payer: MEDICARE

## 2018-03-19 DIAGNOSIS — Z95.2 S/P MITRAL VALVE REPLACEMENT: ICD-10-CM

## 2018-03-19 DIAGNOSIS — Z79.01 LONG-TERM (CURRENT) USE OF ANTICOAGULANTS: ICD-10-CM

## 2018-03-19 DIAGNOSIS — I48.91 AF (ATRIAL FIBRILLATION) (H): ICD-10-CM

## 2018-03-19 LAB — INR POINT OF CARE: 2.4 (ref 0.86–1.14)

## 2018-03-19 PROCEDURE — 36416 COLLJ CAPILLARY BLOOD SPEC: CPT

## 2018-03-19 PROCEDURE — 99207 ZZC NO CHARGE NURSE ONLY: CPT

## 2018-03-19 PROCEDURE — 85610 PROTHROMBIN TIME: CPT | Mod: QW

## 2018-03-19 NOTE — PROGRESS NOTES
ANTICOAGULATION FOLLOW-UP CLINIC VISIT    Patient Name:  Fausto Farr  Date:  3/19/2018  Contact Type:  Face to Face    SUBJECTIVE:     Patient Findings     Positives Missed doses    Comments Denies bleeding/bruising. Pt went to [place](10-12 days trip) about 3 weeks ago, came back on 03/01. Missed 2-3 doses of coumadin while he was in [place]. Resumed regular dosing after trip.              OBJECTIVE    INR Protime   Date Value Ref Range Status   03/19/2018 2.4 (A) 0.86 - 1.14 Final       ASSESSMENT / PLAN  INR assessment THER    Recheck INR In: 5 WEEKS    INR Location Clinic      Anticoagulation Summary as of 3/19/2018     INR goal 2.5-3.5   Today's INR 2.4!   Maintenance plan 5 mg (5 mg x 1) on Mon; 7.5 mg (5 mg x 1.5) all other days   Full instructions 5 mg on Mon; 7.5 mg all other days   Weekly total 50 mg   No change documented Chayito Sanders RN   Plan last modified Caryn Campos RN (11/9/2017)   Next INR check 4/23/2018   Priority INR   Target end date Indefinite    Indications   AF (atrial fibrillation) (H) [I48.91]  S/P mitral valve replacement [Z95.2]  Long-term (current) use of anticoagulants [Z79.01] [Z79.01]         Anticoagulation Episode Summary     INR check location     Preferred lab     Send INR reminders to [facility]    Comments 5mg tabs - andrade dose // transfer from [facility] // [facility] // CALENDAR      Anticoagulation Care Providers     Provider Role Specialty Phone number    Tu Reyes MD  Internal Medicine [phone]            See the Encounter Report to view Anticoagulation Flowsheet and Dosing Calendar (Go to Encounters tab in chart review, and find the Anticoagulation Therapy Visit)    Chayito Sanders RN

## 2018-03-19 NOTE — MR AVS SNAPSHOT
Fausto Carson Yordan   3/19/2018 9:30 AM   Anticoagulation Therapy Visit    Description:  77 year old male   Provider:   [facility] CLINIC   Department:   Nurse           INR as of 3/19/2018     Today's INR 2.4!      Anticoagulation Summary as of 3/19/2018     INR goal 2.5-3.5   Today's INR 2.4!   Full instructions 5 mg on Mon; 7.5 mg all other days   Next INR check 4/23/2018    Indications   AF (atrial fibrillation) (H) [I48.91]  S/P mitral valve replacement [Z95.2]  Long-term (current) use of anticoagulants [Z79.01] [Z79.01]         Your next Anticoagulation Clinic appointment(s)     Mar 19, 2018  9:30 AM CDT   Anticoagulation Visit with  ANTICOAGULATION CLINIC   [facility]    [address] [place] [address]  [address]  [place] [address] [phone]            Apr 23, 2018  9:30 AM CDT   Anticoagulation Visit with  ANTICOAGULATION CLINIC   [facility] ([facility])    [address] [place] [address]  [address]  [facility] [address] [phone]              Contact Numbers     [facility]  Please call  [phone] to cancel and/or reschedule your appointment   Please call  [phone] with any problems or questions regarding your therapy.        March 2018 Details    Sun Mon Tue Wed Thu Fri Sat         1               2               3                 4               5               6               7               8               9               10                 11               12               13               14               15               16               17                 18               19      5 mg   See details      20      7.5 mg         21      7.5 mg         22      7.5 mg         23      7.5 mg         24      7.5 mg           25      7.5 mg         26      5 mg         27      7.5 mg         28      7.5 mg         29      7.5 mg         30      7.5 mg         31      7.5 mg          Date Details   03/19 This INR  check               How to take your warfarin dose     To take:  5 mg Take 1 of the 5 mg tablets.    To take:  7.5 mg Take 1.5 of the 5 mg tablets.           April 2018 Details    Sun Mon Tue Wed Thu Fri Sat     1      7.5 mg         2      5 mg         3      7.5 mg         4      7.5 mg         5      7.5 mg         6      7.5 mg         7      7.5 mg           8      7.5 mg         9      5 mg         10      7.5 mg         11      7.5 mg         12      7.5 mg         13      7.5 mg         14      7.5 mg           15      7.5 mg         16      5 mg         17      7.5 mg         18      7.5 mg         19      7.5 mg         20      7.5 mg         21      7.5 mg           22      7.5 mg         23            24               25               26               27               28                 29               30                     Date Details   No additional details    Date of next INR:  4/23/2018         How to take your warfarin dose     To take:  5 mg Take 1 of the 5 mg tablets.    To take:  7.5 mg Take 1.5 of the 5 mg tablets.

## 2018-04-05 ENCOUNTER — DOCUMENTATION ONLY (OUTPATIENT)
Dept: SLEEP MEDICINE | Facility: CLINIC | Age: 78
End: 2018-04-05

## 2018-04-05 NOTE — PROGRESS NOTES
6 month [facility] Recheck Visit     Message left for patient to return call     Assessment: Pt meeting objective benchmarks.     Action plan: waiting for patient to return call.   pt to follow up per provider request (1-2 yrs)    Diagnostic AHI:18.8  Device type: Auto-CPAP  PAP settings: CPAP min 6 cm  H20     CPAP max 16 cm  H20         95th% pressure 10.9 cm  H20   Objective measures: 14 day rolling measures      Compliance  92 %      Leak  32.08 lpm  last  upload      AHI 0.73   last  upload      Average number of minutes 423      Objective measure goal  Compliance   Goal >70%  Leak   Goal < 24 lpm  AHI  Goal < 5  Usage  Goal >240

## 2018-04-13 DIAGNOSIS — R33.9 URINARY RETENTION: ICD-10-CM

## 2018-04-13 RX ORDER — TAMSULOSIN HYDROCHLORIDE 0.4 MG/1
CAPSULE ORAL
Qty: 90 CAPSULE | Refills: 0 | Status: SHIPPED | OUTPATIENT
Start: 2018-04-13 | End: 2018-05-08

## 2018-04-13 NOTE — TELEPHONE ENCOUNTER
"Requested Prescriptions   Pending Prescriptions Disp Refills     tamsulosin (FLOMAX) 0.4 MG capsule [Pharmacy Med Name: TAMSULOSIN 0.4MG CAPSULES] 90 capsule 0      Last Written Prescription Date:  10/9/17  Last Fill Quantity: 90,  # refills: 1   Last office visit: 4/14/2017 with prescribing provider:     Future Office Visit:   Next 5 appointments (look out 90 days)     Apr 27, 2018  8:10 AM CDT   PHYSICAL with Tu Reyes MD   [facility] ([facility]    [address] [place] [address]  [address]  [facility] [address]   [phone]                  Sig: TAKE 1 CAPSULE BY MOUTH EVERY DAY    Alpha Blockers Passed    4/13/2018  7:46 AM       Passed - Blood pressure under 140/90 in past 12 months    BP Readings from Last 3 Encounters:   11/03/17 127/74   09/12/17 131/81   08/15/17 108/68                Passed - Recent (12 mo) or future (30 days) visit within the authorizing provider's specialty    Patient had office visit in the last 12 months or has a visit in the next 30 days with authorizing provider or within the authorizing provider's specialty.  See \"Patient Info\" tab in inbasket, or \"Choose Columns\" in Meds & Orders section of the refill encounter.           Passed - Patient does not have Tadalafil, Vardenafil, or Sildenafil on their medication list       Passed - Patient is 18 years of age or older            "

## 2018-04-23 ENCOUNTER — ANTICOAGULATION THERAPY VISIT (OUTPATIENT)
Dept: NURSING | Facility: CLINIC | Age: 78
End: 2018-04-23
Payer: MEDICARE

## 2018-04-23 DIAGNOSIS — Z95.2 S/P MITRAL VALVE REPLACEMENT: ICD-10-CM

## 2018-04-23 DIAGNOSIS — Z79.01 LONG-TERM (CURRENT) USE OF ANTICOAGULANTS: ICD-10-CM

## 2018-04-23 DIAGNOSIS — I48.91 AF (ATRIAL FIBRILLATION) (H): ICD-10-CM

## 2018-04-23 LAB — INR POINT OF CARE: 3.5 (ref 0.86–1.14)

## 2018-04-23 PROCEDURE — 99207 ZZC NO CHARGE NURSE ONLY: CPT

## 2018-04-23 PROCEDURE — 36416 COLLJ CAPILLARY BLOOD SPEC: CPT

## 2018-04-23 PROCEDURE — 85610 PROTHROMBIN TIME: CPT | Mod: QW

## 2018-04-23 NOTE — MR AVS SNAPSHOT
Fausto Farr   4/23/2018 9:30 AM   Anticoagulation Therapy Visit    Description:  77 year old male   Provider:  [facility]   Department:  [facility]           INR as of 4/23/2018     Today's INR 3.5      Anticoagulation Summary as of 4/23/2018     INR goal 2.5-3.5   Today's INR 3.5   Full instructions 5 mg on Mon; 7.5 mg all other days   Next INR check 5/21/2018    Indications   AF (atrial fibrillation) (H) [I48.91]  S/P mitral valve replacement [Z95.2]  Long-term (current) use of anticoagulants [Z79.01] [Z79.01]         Your next Anticoagulation Clinic appointment(s)     May 21, 2018  8:45 AM CDT   Anticoagulation Visit with  ANTICOAGULATION CLINIC   [facility] [place] ([facility]    [address] [place] [address]  [address]  [facility] [address] [phone]              Contact Numbers     [facility]  Please call  [phone] to cancel and/or reschedule your appointment   Please call  [phone] with any problems or questions regarding your therapy.        April 2018 Details    Sun Mon Tue Wed Thu Fri Sat     1               2               3               4               5               6               7                 8               9               10               11               12               13               14                 15               16               17               18               19               20               21                 22               23      5 mg   See details      24      7.5 mg         25      7.5 mg         26      7.5 mg         27      7.5 mg         28      7.5 mg           29      7.5 mg         30      5 mg               Date Details   04/23 This INR check               How to take your warfarin dose     To take:  5 mg Take 1 of the 5 mg tablets.    To take:  7.5 mg Take 1.5 of the 5 mg tablets.           May 2018 Details    Sun Mon Tue Wed Thu Fri Sat       1      7.5 mg         2      7.5 mg         3      7.5 mg          4      7.5 mg         5      7.5 mg           6      7.5 mg         7      5 mg         8      7.5 mg         9      7.5 mg         10      7.5 mg         11      7.5 mg         12      7.5 mg           13      7.5 mg         14      5 mg         15      7.5 mg         16      7.5 mg         17      7.5 mg         18      7.5 mg         19      7.5 mg           20      7.5 mg         21            22               23               24               25               26                 27               28               29               30               31                  Date Details   No additional details    Date of next INR:  5/21/2018         How to take your warfarin dose     To take:  5 mg Take 1 of the 5 mg tablets.    To take:  7.5 mg Take 1.5 of the 5 mg tablets.

## 2018-04-23 NOTE — PROGRESS NOTES
ANTICOAGULATION FOLLOW-UP CLINIC VISIT    Patient Name:  Fausto Farr  Date:  2018  Contact Type:  Face to Face    SUBJECTIVE:     Patient Findings     Positives No Problem Findings    Comments His step-daughter  on 19.  In her 40s of pancreatic cancer.           OBJECTIVE    INR Protime   Date Value Ref Range Status   2018 3.5 (A) 0.86 - 1.14 Final       ASSESSMENT / PLAN  INR assessment THER    Recheck INR In: 4 WEEKS    INR Location Clinic      Anticoagulation Summary as of 2018     INR goal 2.5-3.5   Today's INR 3.5   Maintenance plan 5 mg (5 mg x 1) on Mon; 7.5 mg (5 mg x 1.5) all other days   Full instructions 5 mg on Mon; 7.5 mg all other days   Weekly total 50 mg   No change documented Caryn Campos RN   Plan last modified Caryn Campos RN (2017)   Next INR check 2018   Priority INR   Target end date Indefinite    Indications   AF (atrial fibrillation) (H) [I48.91]  S/P mitral valve replacement [Z95.2]  Long-term (current) use of anticoagulants [Z79.01] [Z79.01]         Anticoagulation Episode Summary     INR check location     Preferred lab     Send INR reminders to [facility]    Comments 5mg tabs - andrade dose // transfer from [facility] // [facility] // CALENDAR      Anticoagulation Care Providers     Provider Role Specialty Phone number    Tu Reyes MD  Internal Medicine [phone]            See the Encounter Report to view Anticoagulation Flowsheet and Dosing Calendar (Go to Encounters tab in chart review, and find the Anticoagulation Therapy Visit)        Caryn Campos RN

## 2018-04-28 DIAGNOSIS — E78.2 MIXED HYPERLIPIDEMIA: ICD-10-CM

## 2018-04-30 RX ORDER — EZETIMIBE 10 MG/1
TABLET ORAL
Qty: 90 TABLET | Refills: 0 | Status: SHIPPED | OUTPATIENT
Start: 2018-04-30 | End: 2018-05-08

## 2018-04-30 NOTE — TELEPHONE ENCOUNTER
Routing refill request to provider for review/approval because:  Labs not current:  Lipids and ALT  Last OV with [facility] provider 4-14-17 with Nicole Siegler, PA

## 2018-04-30 NOTE — TELEPHONE ENCOUNTER
"Requested Prescriptions   Pending Prescriptions Disp Refills     ezetimibe (ZETIA) 10 MG tablet [Pharmacy Med Name: EZETIMIBE 10MG TABLETS]  Last Written Prescription Date:  10/31/17  Last Fill Quantity: 90,  # refills: 1   Last office visit: 4/14/2017 with prescribing provider:  Siegler   Future Office Visit:   Next 5 appointments (look out 90 days)     May 08, 2018  9:30 AM CDT   PHYSICAL with Tu Reyes MD   [facility]    [address] [place] [address]  [address]  [facility] [address] [phone]                  90 tablet 0     Sig: TAKE 1 TABLET BY MOUTH DAILY    Antihyperlipidemic agents Failed    4/28/2018  9:34 AM       Failed - Lipid panel on file in past 12 mos    Recent Labs   Lab Test  04/25/17   1244   06/03/15   0933   CHOL  134   < >  162   TRIG  206*   < >  87   HDL  36*   < >  45   LDL  57   < >  100   NHDL  98   < >   --    VLDL   --    --   17   CHOLHDLRATIO   --    --   3.6    < > = values in this interval not displayed.              Failed - Normal serum ALT on record in past 12 mos    Recent Labs   Lab Test  04/25/17   1244   ALT  27            Passed - Recent (12 mo) or future (30 days) visit within the authorizing provider's specialty    Patient had office visit in the last 12 months or has a visit in the next 30 days with authorizing provider or within the authorizing provider's specialty.  See \"Patient Info\" tab in inbasket, or \"Choose Columns\" in Meds & Orders section of the refill encounter.           Passed - Patient is age 18 years or older          "

## 2018-05-08 ENCOUNTER — OFFICE VISIT (OUTPATIENT)
Dept: PEDIATRICS | Facility: CLINIC | Age: 78
End: 2018-05-08
Payer: MEDICARE

## 2018-05-08 VITALS
DIASTOLIC BLOOD PRESSURE: 72 MMHG | OXYGEN SATURATION: 96 % | SYSTOLIC BLOOD PRESSURE: 124 MMHG | TEMPERATURE: 97.8 F | HEIGHT: 65 IN | BODY MASS INDEX: 37.65 KG/M2 | WEIGHT: 226 LBS | HEART RATE: 65 BPM

## 2018-05-08 DIAGNOSIS — Z12.5 ENCOUNTER FOR SCREENING FOR MALIGNANT NEOPLASM OF PROSTATE: ICD-10-CM

## 2018-05-08 DIAGNOSIS — Z95.2 S/P AORTIC VALVE REPLACEMENT: ICD-10-CM

## 2018-05-08 DIAGNOSIS — L30.9 ECZEMA, UNSPECIFIED TYPE: ICD-10-CM

## 2018-05-08 DIAGNOSIS — K51.20 ULCERATIVE PROCTITIS WITHOUT COMPLICATION (H): ICD-10-CM

## 2018-05-08 DIAGNOSIS — E78.5 HYPERLIPIDEMIA, UNSPECIFIED HYPERLIPIDEMIA TYPE: ICD-10-CM

## 2018-05-08 DIAGNOSIS — R33.9 URINARY RETENTION: ICD-10-CM

## 2018-05-08 DIAGNOSIS — I25.810 CORONARY ARTERY DISEASE INVOLVING CORONARY BYPASS GRAFT OF NATIVE HEART WITHOUT ANGINA PECTORIS: ICD-10-CM

## 2018-05-08 DIAGNOSIS — I50.32 CHRONIC DIASTOLIC CONGESTIVE HEART FAILURE (H): ICD-10-CM

## 2018-05-08 DIAGNOSIS — E78.2 MIXED HYPERLIPIDEMIA: ICD-10-CM

## 2018-05-08 DIAGNOSIS — Z00.00 ROUTINE GENERAL MEDICAL EXAMINATION AT A HEALTH CARE FACILITY: Primary | ICD-10-CM

## 2018-05-08 DIAGNOSIS — Z79.01 LONG-TERM (CURRENT) USE OF ANTICOAGULANTS: ICD-10-CM

## 2018-05-08 LAB
ALBUMIN SERPL-MCNC: 3.8 G/DL (ref 3.4–5)
ALP SERPL-CCNC: 43 U/L (ref 40–150)
ALT SERPL W P-5'-P-CCNC: 32 U/L (ref 0–70)
ANION GAP SERPL CALCULATED.3IONS-SCNC: 7 MMOL/L (ref 3–14)
AST SERPL W P-5'-P-CCNC: 29 U/L (ref 0–45)
BILIRUB SERPL-MCNC: 0.5 MG/DL (ref 0.2–1.3)
BUN SERPL-MCNC: 14 MG/DL (ref 7–30)
CALCIUM SERPL-MCNC: 8.6 MG/DL (ref 8.5–10.1)
CHLORIDE SERPL-SCNC: 106 MMOL/L (ref 94–109)
CHOLEST SERPL-MCNC: 119 MG/DL
CO2 SERPL-SCNC: 27 MMOL/L (ref 20–32)
CREAT SERPL-MCNC: 0.91 MG/DL (ref 0.66–1.25)
ERYTHROCYTE [DISTWIDTH] IN BLOOD BY AUTOMATED COUNT: 13.4 % (ref 10–15)
GFR SERPL CREATININE-BSD FRML MDRD: 81 ML/MIN/1.7M2
GLUCOSE SERPL-MCNC: 103 MG/DL (ref 70–99)
HCT VFR BLD AUTO: 39.7 % (ref 40–53)
HDLC SERPL-MCNC: 32 MG/DL
HGB BLD-MCNC: 12.8 G/DL (ref 13.3–17.7)
LDLC SERPL CALC-MCNC: 52 MG/DL
MCH RBC QN AUTO: 30 PG (ref 26.5–33)
MCHC RBC AUTO-ENTMCNC: 32.2 G/DL (ref 31.5–36.5)
MCV RBC AUTO: 93 FL (ref 78–100)
NONHDLC SERPL-MCNC: 87 MG/DL
PLATELET # BLD AUTO: 257 10E9/L (ref 150–450)
POTASSIUM SERPL-SCNC: 4 MMOL/L (ref 3.4–5.3)
PROT SERPL-MCNC: 7.4 G/DL (ref 6.8–8.8)
PSA SERPL-ACNC: <0.01 UG/L (ref 0–4)
RBC # BLD AUTO: 4.26 10E12/L (ref 4.4–5.9)
SODIUM SERPL-SCNC: 140 MMOL/L (ref 133–144)
TRIGL SERPL-MCNC: 176 MG/DL
TSH SERPL DL<=0.005 MIU/L-ACNC: 0.96 MU/L (ref 0.4–4)
WBC # BLD AUTO: 8.2 10E9/L (ref 4–11)

## 2018-05-08 PROCEDURE — G0103 PSA SCREENING: HCPCS | Performed by: INTERNAL MEDICINE

## 2018-05-08 PROCEDURE — 84443 ASSAY THYROID STIM HORMONE: CPT | Performed by: INTERNAL MEDICINE

## 2018-05-08 PROCEDURE — 80053 COMPREHEN METABOLIC PANEL: CPT | Performed by: INTERNAL MEDICINE

## 2018-05-08 PROCEDURE — 80061 LIPID PANEL: CPT | Performed by: INTERNAL MEDICINE

## 2018-05-08 PROCEDURE — 93000 ELECTROCARDIOGRAM COMPLETE: CPT | Performed by: INTERNAL MEDICINE

## 2018-05-08 PROCEDURE — 36415 COLL VENOUS BLD VENIPUNCTURE: CPT | Performed by: INTERNAL MEDICINE

## 2018-05-08 PROCEDURE — 85027 COMPLETE CBC AUTOMATED: CPT | Performed by: INTERNAL MEDICINE

## 2018-05-08 PROCEDURE — 99214 OFFICE O/P EST MOD 30 MIN: CPT | Mod: 25 | Performed by: INTERNAL MEDICINE

## 2018-05-08 PROCEDURE — G0439 PPPS, SUBSEQ VISIT: HCPCS | Performed by: INTERNAL MEDICINE

## 2018-05-08 RX ORDER — WARFARIN SODIUM 5 MG/1
TABLET ORAL
Qty: 135 TABLET | Refills: 3 | Status: SHIPPED | OUTPATIENT
Start: 2018-05-08 | End: 2019-05-21

## 2018-05-08 RX ORDER — TAMSULOSIN HYDROCHLORIDE 0.4 MG/1
0.4 CAPSULE ORAL DAILY
Qty: 90 CAPSULE | Refills: 3 | Status: SHIPPED | OUTPATIENT
Start: 2018-05-08 | End: 2019-05-11

## 2018-05-08 RX ORDER — FUROSEMIDE 40 MG
20 TABLET ORAL DAILY
Qty: 45 TABLET | Refills: 3 | Status: SHIPPED | OUTPATIENT
Start: 2018-05-08 | End: 2019-04-29

## 2018-05-08 RX ORDER — ROSUVASTATIN CALCIUM 40 MG/1
40 TABLET, COATED ORAL DAILY
Qty: 90 TABLET | Refills: 3 | Status: SHIPPED | OUTPATIENT
Start: 2018-05-08 | End: 2019-05-30

## 2018-05-08 RX ORDER — METOPROLOL TARTRATE 25 MG/1
25 TABLET, FILM COATED ORAL 2 TIMES DAILY
Qty: 180 TABLET | Refills: 3 | Status: SHIPPED | OUTPATIENT
Start: 2018-05-08 | End: 2019-05-30

## 2018-05-08 RX ORDER — BETAMETHASONE VALERATE 0.1 %
LOTION (ML) TOPICAL
Qty: 60 ML | Refills: 3 | Status: SHIPPED | OUTPATIENT
Start: 2018-05-08 | End: 2019-05-30

## 2018-05-08 RX ORDER — FLUOCINONIDE 0.5 MG/G
OINTMENT TOPICAL
Qty: 60 G | Refills: 2 | Status: SHIPPED | OUTPATIENT
Start: 2018-05-08 | End: 2019-05-21

## 2018-05-08 RX ORDER — EZETIMIBE 10 MG/1
10 TABLET ORAL DAILY
Qty: 90 TABLET | Refills: 3 | Status: SHIPPED | OUTPATIENT
Start: 2018-05-08 | End: 2019-03-21

## 2018-05-08 RX ORDER — MESALAMINE 800 MG/1
1 TABLET, DELAYED RELEASE ORAL 2 TIMES DAILY
Qty: 180 TABLET | Refills: 3 | Status: SHIPPED | OUTPATIENT
Start: 2018-05-08 | End: 2019-03-21

## 2018-05-08 ASSESSMENT — ENCOUNTER SYMPTOMS
HEMATURIA: 0
CONSTIPATION: 0
DIZZINESS: 0
CHILLS: 0
DIARRHEA: 0
HEMATOCHEZIA: 0
ABDOMINAL PAIN: 0
EYE PAIN: 0

## 2018-05-08 ASSESSMENT — ACTIVITIES OF DAILY LIVING (ADL)
CURRENT_FUNCTION: NO ASSISTANCE NEEDED
I_NEED_ASSISTANCE_FOR_THE_FOLLOWING_DAILY_ACTIVITIES:: NO ASSISTANCE IS NEEDED

## 2018-05-08 NOTE — MR AVS SNAPSHOT
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 If ongoing we will get the  holter monitor as we discussed    2) Labs downstairs today    3) Refilled medications    4) Ear will look more red before looking better, put topical antibiotic, aquaphor or Eucerin over areas if needed    5) Follow-up with back doctor as we discussed    Tu Reyes MD      Preventive Health Recommendations:   Male Ages 65 and over    Yearly exam:             See your health care provider every year in order to  o   Review health changes.   o   Discuss preventive care.    o   Review your medicines if your doctor has prescribed any.    Talk with your health care provider about whether you should have a test to screen for prostate cancer (PSA).    Every 3 years, have a diabetes test (fasting glucose). If you are at risk for diabetes, you should have this test more often.    Every 5 years, have a cholesterol test. Have this test more often if you are at risk for high cholesterol or heart disease.      Every 10 years, have a colonoscopy. Or, have a yearly FIT test (stool test). These exams will check for colon cancer.    Talk to with your health care provider about screening for Abdominal Aortic Aneurysm if you have a family history of AAA or have a history of smoking.    Shots:     Get a flu shot each year.     Get a tetanus shot every 10 years.     Talk to your doctor about your pneumonia vaccines. There are now two you should receive - Pneumovax (PPSV 23) and Prevnar (PCV 13).     Talk to your doctor about a shingles vaccine.     Talk to your doctor about the hepatitis B vaccine.  Nutrition:     Eat at least 5 servings of fruits and vegetables each day.     Eat whole-grain bread, whole-wheat pasta and brown rice instead of white grains and rice.     Talk to your provider about Calcium and Vitamin D.   Lifestyle    Exercise for at least 150 minutes a week (30 minutes a day, 5 days a week). This will help you control your weight and prevent disease.     Limit alcohol to one drink per day.     No smoking.     Wear sunscreen to prevent skin cancer.     See your dentist every six months for an exam and cleaning.     See your eye doctor every 1 to 2 years to screen for conditions such as glaucoma, macular degeneration, cataracts, etc     Preventive Health Recommendations:       Male Ages 65 and over    Yearly exam:             See your health care provider every year in order to  o   Review health changes.   o   Discuss preventive care.    o   Review your medicines if your doctor has prescribed any.    Talk with your health care provider about whether you should have a test to screen for prostate cancer (PSA).    Every 3 years, have a diabetes test (fasting glucose). If you are at risk for diabetes, you should have this test more often.    Every 5 years, have a cholesterol test. Have this test more often if you are at risk for high cholesterol or heart disease.     Every 10 years, have a colonoscopy. Or, have a  yearly FIT test (stool test). These exams will check for colon cancer.    Talk to with your health care provider about screening for Abdominal Aortic Aneurysm if you have a family history of AAA or have a history of smoking.  Shots:     Get a flu shot each year.     Get a tetanus shot every 10 years.     Talk to your doctor about your pneumonia vaccines. There are now two you should receive - Pneumovax (PPSV 23) and Prevnar (PCV 13).    Talk to your doctor about a shingles vaccine.     Talk to your doctor about the hepatitis B vaccine.  Nutrition:     Eat at least 5 servings of fruits and vegetables each day.     Eat whole-grain bread, whole-wheat pasta and brown rice instead of white grains and rice.     Talk to your doctor about Calcium and Vitamin D.   Lifestyle    Exercise for at least 150 minutes a week (30 minutes a day, 5 days a week). This will help you control your weight and prevent disease.     Limit alcohol to one drink per day.     No smoking.     Wear sunscreen to prevent skin cancer.     See your dentist every six months for an exam and cleaning.     See your eye doctor every 1 to 2 years to screen for conditions such as glaucoma, macular degeneration and cataracts.          Follow-ups after your visit        Additional Services     GASTROENTEROLOGY ADULT REF PROCEDURE ONLY       Last Lab Result: Creatinine (mg/dL)       Date                     Value                 04/25/2017               1.00             ----------  Body mass index is 37.61 kg/(m^2).      Patient will be contacted to schedule procedure.     Please be aware that coverage of these services is subject to the terms and limitations of your health insurance plan.  Call member services at your health plan with any benefit or coverage questions.  Any procedures must be performed at a [facility] facility OR coordinated by your clinic's referral office.    Please bring the following with you to your appointment:    (1) Any X-Rays, CTs or MRIs  "which have been performed.  Contact the facility where they were done to arrange for  prior to your scheduled appointment.    (2) List of current medications   (3) This referral request   (4) Any documents/labs given to you for this referral                  Follow-up notes from your care team     Return in about 1 year (around 5/8/2019) for Physical Exam.      Your next 10 appointments already scheduled     May 21, 2018  8:45 AM CDT   Anticoagulation Visit with  ANTICOAGULATION CLINIC   [facility])    [address]  [address]  [place] [address] [phone]            Oct 19, 2018  9:00 AM CDT   Return Sleep Patient with Isrrael Dobbins MD   [facility] ([facility])    [address]  [place] [address] [phone]              Who to contact     If you have questions or need follow up information about today's clinic visit or your schedule please contact [facility] directly at [phone].  Normal or non-critical lab and imaging results will be communicated to you by MyChart, letter or phone within 4 business days after the clinic has received the results. If you do not hear from us within 7 days, please contact the clinic through [email or web address] or phone. If you have a critical or abnormal lab result, we will notify you by phone as soon as possible.  Submit refill requests through [employer] or call your pharmacy and they will forward the refill request to us. Please allow 3 business days for your refill to be completed.          Additional Information About Your Visit        [employer] Information     [employer] lets you send messages to your doctor, view your test results, renew your prescriptions, schedule appointments and more. To sign up, go to [email or web address] . Click on \"Log in\" on the left side of the screen, which will take you to the Welcome page. Then click on " "\"Sign up Now\" on the right side of the page.     You will be asked to enter the access code listed below, as well as some personal information. Please follow the directions to create your username and password.     Your access code is: [email or web address]  Expires: 2018  8:30 AM     Your access code will  in 90 days. If you need help or a new code, please call your [facility] clinic or [phone].        Care EveryWhere ID     This is your Care EveryWhere ID. This could be used by other organizations to access your [facility] medical records  [id number]        Your Vitals Were     Pulse Temperature Height Pulse Oximetry BMI (Body Mass Index)       65 97.8  F (36.6  C) (Oral) 5' 5\" (1.651 m) 96% 37.61 kg/m2        Blood Pressure from Last 3 Encounters:   18 124/72   17 127/74   17 131/81    Weight from Last 3 Encounters:   18 226 lb (102.5 kg)   17 220 lb (99.8 kg)   17 220 lb (99.8 kg)              We Performed the Following     CBC with platelets     Comprehensive metabolic panel     EKG 12-lead complete w/read - Clinics     GASTROENTEROLOGY ADULT REF PROCEDURE ONLY     Lipid panel reflex to direct LDL Fasting     PSA, screen     TSH with free T4 reflex          Today's Medication Changes          These changes are accurate as of 18 10:26 AM.  If you have any questions, ask your nurse or doctor.               These medicines have changed or have updated prescriptions.        Dose/Directions    ezetimibe 10 MG tablet   Commonly known as:  ZETIA   This may have changed:  See the new instructions.   Used for:  Mixed hyperlipidemia   Changed by:  Tu Reyes MD        Dose:  10 mg   Take 1 tablet (10 mg) by mouth daily   Quantity:  90 tablet   Refills:  3       rosuvastatin 40 MG tablet   Commonly known as:  CRESTOR   This may have changed:  See the new instructions.   Used for:  Hyperlipidemia, unspecified hyperlipidemia type   Changed by:  Tu Reyes MD     "    Dose:  40 mg   Take 1 tablet (40 mg) by mouth daily   Quantity:  90 tablet   Refills:  3       tamsulosin 0.4 MG capsule   Commonly known as:  FLOMAX   This may have changed:  See the new instructions.   Used for:  Urinary retention   Changed by:  Tu Reyes MD        Dose:  0.4 mg   Take 1 capsule (0.4 mg) by mouth daily   Quantity:  90 capsule   Refills:  3       warfarin 5 MG tablet   Commonly known as:  COUMADIN   This may have changed:  Another medication with the same name was removed. Continue taking this medication, and follow the directions you see here.   Used for:  Long-term (current) use of anticoagulants, S/P aortic valve replacement   Changed by:  Tu Reyes MD        Take 1 1/2 tablets (7.5 mg) by mouth TWTFSS; 1 tablet (5 mg) on Mondays OR as directed by INR Clinic   Quantity:  135 tablet   Refills:  3         Stop taking these medicines if you haven't already. Please contact your care team if you have questions.     ASPIRIN EC PO   Stopped by:  Tu Reyes MD           OMEGA-3 FISH OIL PO   Stopped by:  Tu Reyes MD                Where to get your medicines      These medications were sent to [facility]  [place], [place] - [address]  AT [address]  [address] [place] [address]     Phone:  [phone]     fluocinonide 0.05 % ointment         Some of these will need a paper prescription and others can be bought over the counter.  Ask your nurse if you have questions.     Bring a paper prescription for each of these medications     betamethasone valerate 0.1 % lotion    ezetimibe 10 MG tablet    furosemide 40 MG tablet    mesalamine 800 MG EC tablet    metoprolol tartrate 25 MG tablet    rosuvastatin 40 MG tablet    tamsulosin 0.4 MG capsule    warfarin 5 MG tablet                Primary Care Provider Office Phone # Fax #    Tu Reyes [phone]       [facility] [place]  [place] [address]        Equal Access to Services     [facility]: Hadii ted garsia osvaldo Wu, waaxda luqadaha, qaybta kaalmada brianmatthewrenetta, denny milind jerryainsleyfidel goldberg. [facility] [phone].    ATENCIÓN: Si habla español, tiene a brown disposición servicios gratuitos de asistencia lingüística. Tangame al [phone].    We comply with applicable federal civil rights laws and [place] laws. We do not discriminate on the basis of race, color, national origin, age, disability, sex, sexual orientation, or gender identity.            Thank you!     Thank you for choosing [facility] [place]  for your care. Our goal is always to provide you with excellent care. Hearing back from our patients is one way we can continue to improve our services. Please take a few minutes to complete the written survey that you may receive in the mail after your visit with us. Thank you!             Your Updated Medication List - Protect others around you: Learn how to safely use, store and throw away your medicines at www.disposemymeds.org.          This list is accurate as of 5/8/18 10:26 AM.  Always use your most recent med list.                   Brand Name Dispense Instructions for use Diagnosis    betamethasone valerate 0.1 % lotion    VALISONE    60 mL    APPLY TOPICALLY TWICE DAILY PRN    Eczema, unspecified type       ezetimibe 10 MG tablet    ZETIA    90 tablet    Take 1 tablet (10 mg) by mouth daily    Mixed hyperlipidemia       fluocinonide 0.05 % ointment    LIDEX    60 g    2- 30 gram tubes.  Apply twice a day as needed.    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)       metoprolol tartrate 25 MG tablet    LOPRESSOR    180 tablet    Take  1 tablet (25 mg) by mouth 2 times daily    Coronary artery disease involving coronary bypass graft 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       rosuvastatin 40 MG tablet    CRESTOR    90 tablet    Take 1 tablet (40 mg) by mouth daily    Hyperlipidemia, unspecified hyperlipidemia type       tamsulosin 0.4 MG capsule    FLOMAX    90 capsule    Take 1 capsule (0.4 mg) by mouth daily    Urinary retention       warfarin 5 MG tablet    COUMADIN    135 tablet    Take 1 1/2 tablets (7.5 mg) by mouth TWTFSS; 1 tablet (5 mg) on Mondays OR as directed by INR Clinic    Long-term (current) use of anticoagulants, S/P aortic valve replacement

## 2018-05-08 NOTE — PROGRESS NOTES
SUBJECTIVE:   Fausto Farr is a 78 year old male who presents for Preventive Visit.    Are you in the first 12 months of your Medicare coverage?  No    Physical   Annual:     Getting at least 3 servings of Calcium per day::  Yes    Bi-annual eye exam::  Yes    Dental care twice a year::  Yes    Sleep apnea or symptoms of sleep apnea::  None    Diet::  Regular (no restrictions)    Taking medications regularly::  Yes    Medication side effects::  None    Additional concerns today::  YES    Ability to successfully perform activities of daily living: no assistance needed  Home Safety:  No safety concerns identified  Hearing Impairment: difficulty following a conversation in a noisy restaurant or crowded room    Ulcerative proctitis: noted on previous scopes. Has been on mesalamine without issues, no hematochezia or melena noted. No abdominal pain.     Atrial fibrillation: has bee on warfarin for this. Rate controlled with metoprolol. No chest pain or shortness of breath. No lower extremity edema.     Leg pain: has noted history of PVD in chart, on warfarin, does get pain with walking, better with leaning forward. History of spinal stenosis, seen by Dr. Wise in the past for spinal surgery (about 2.5 year ago).     AAA: s/p repair, following with periodic CT scans, no leaking of grafting.      CAD: following with cardiology, on aspirin with warfarin. On Crestor 40 mg per day. Taking zetia as well. 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      Prostate cancer: had radiation seeds placed in the past, has been following PSA, no changes in urination, no dysuria. No hematuria.         Fall risk:  Fallen 2 or more times in the past year?: No  Any fall with injury in the past year?: No    COGNITIVE SCREEN  1) Repeat 3 items (Banana, Sunrise, Chair)    2) Clock draw: NORMAL  3) 3 item recall: Recalls 3 objects  Results: 3 items recalled: COGNITIVE IMPAIRMENT LESS LIKELY    Mini-CogTM  Copyright CARMELO Cedillo. Licensed by the author for use in [facility]; reprinted with permission (sandi@[email or web address]). All rights reserved.        Reviewed and updated as needed this visit by clinical staff  Tobacco  Allergies  Meds  Med Hx  Surg Hx  Fam Hx  Soc Hx        Reviewed and updated as needed this visit by Provider        Social History   Substance Use Topics     Smoking status: Former Smoker     Packs/day: 1.00     Years: 40.00     Quit date: 10/23/2002     Smokeless tobacco: Never Used     Alcohol use Yes      Comment: a couple beers per week       Alcohol Use 5/8/2018   If you drink alcohol do you typically have greater than 3 drinks per day OR greater than 7 drinks per week? No   No flowsheet data found.            Today's PHQ-2 Score:   PHQ-2 ( 1999 Pfizer) 5/8/2018   Q1: Little interest or pleasure in doing things 0   Q2: Feeling down, depressed or hopeless 0   PHQ-2 Score 0   Q1: Little interest or pleasure in doing things Not at all   Q2: Feeling down, depressed or hopeless Not at all   PHQ-2 Score 0       Do you feel safe in your environment - Yes    Do you have a Health Care Directive?: No: Advance care planning reviewed with patient; information given to patient to review.    Current providers sharing in care for this patient include:   Patient Care Team:  Tu Reyes MD as PCP - General (Internal Medicine)  Calderon Santo MD as MD (Cardiology)  Elder aDo, APRN CNP as Nurse Practitioner (Nurse Practitioner)  Walt Garcia MD as MD (Orthopedics)    The following health maintenance items are reviewed in Epic and correct as of today:  Health Maintenance   Topic Date Due     BMP Q6 MOS  10/25/2017     ALT Q1 YR  04/25/2018     LIPID MONITORING Q1 YEAR  04/25/2018     CBC Q1 YR  04/25/2018     FALL RISK ASSESSMENT  04/14/2018     HF ACTION PLAN Q3 YR  07/18/2019     ADVANCE DIRECTIVE PLANNING Q5 YRS  10/09/2020     TETANUS IMMUNIZATION (SYSTEM ASSIGNED)  09/07/2027  "    PNEUMOCOCCAL  Completed     INFLUENZA VACCINE  Completed     Labs reviewed in EPIC    Pneumonia Vaccine:Adults age 65+ who received Pneumovax (PPSV23) at 65 years or older: Should be given PCV13 > 1 year after their most recent PPSV23    Review of Systems   Constitutional: Negative for chills.   HENT: Positive for ear pain. Negative for congestion.    Eyes: Negative for pain.   Cardiovascular: Negative for chest pain.   Gastrointestinal: Negative for abdominal pain, constipation, diarrhea and hematochezia.   Genitourinary: Negative for hematuria.   Neurological: Negative for dizziness.     Constitutional, HEENT, cardiovascular, pulmonary, GI, , musculoskeletal, neuro, skin, endocrine and psych systems are negative, except as otherwise noted.    OBJECTIVE:   /72 (BP Location: Right arm, Cuff Size: Adult Regular)  Pulse 65  Temp 97.8  F (36.6  C) (Oral)  Ht 5' 5\" (1.651 m)  Wt 226 lb (102.5 kg)  SpO2 96%  BMI 37.61 kg/m2 Estimated body mass index is 37.61 kg/(m^2) as calculated from the following:    Height as of this encounter: 5' 5\" (1.651 m).    Weight as of this encounter: 226 lb (102.5 kg).  Physical Exam  GENERAL: healthy, alert and no distress  EYES: Eyes grossly normal to inspection, PERRL and conjunctivae and sclerae normal  HENT: ear canals and TM's normal, nose and mouth without ulcers or lesions  NECK: no adenopathy, no asymmetry, masses, or scars and thyroid normal to palpation  RESP: lungs clear to auscultation - no rales, rhonchi or wheezes  CV: regular rate and rhythm, normal S1 S2, no S3 or S4, no murmur, click or rub, no peripheral edema and peripheral pulses strong  ABDOMEN: soft, nontender, no hepatosplenomegaly, no masses and bowel sounds normal  MS: no gross musculoskeletal defects noted, no edema  SKIN: no suspicious lesions or rashes  NEURO: Normal strength and tone, mentation intact and speech normal  BACK: no CVA tenderness, no paralumbar tenderness  PSYCH: mentation appears " "normal, affect normal/bright    ASSESSMENT / PLAN:       ICD-10-CM    1. Routine general medical examination at a health care facility Z00.00 Comprehensive metabolic panel     PSA, screen     Lipid panel reflex to direct LDL Fasting     CBC with platelets     TSH with free T4 reflex   2. Eczema, unspecified type L30.9 betamethasone valerate (VALISONE) 0.1 % lotion     fluocinonide (LIDEX) 0.05 % ointment   3. Mixed hyperlipidemia E78.2 ezetimibe (ZETIA) 10 MG tablet   4. Chronic diastolic congestive heart failure (H) I50.32 furosemide (LASIX) 40 MG tablet     EKG 12-lead complete w/read - Clinics   5. 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 Coronary artery disease involving coronary bypass graft 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 I25.810 metoprolol tartrate (LOPRESSOR) 25 MG tablet   7. Hyperlipidemia, unspecified hyperlipidemia type E78.5 rosuvastatin (CRESTOR) 40 MG tablet   8. Urinary retention R33.9 tamsulosin (FLOMAX) 0.4 MG capsule   9. Long-term (current) use of anticoagulants [Z79.01] Z79.01 warfarin (COUMADIN) 5 MG tablet     EKG 12-lead complete w/read - Clinics     ASPIRIN NOT PRESCRIBED (INTENTIONAL)   10. S/P aortic valve replacement Z95.2 warfarin (COUMADIN) 5 MG tablet     ASPIRIN NOT PRESCRIBED (INTENTIONAL)   11. Encounter for screening for malignant neoplasm of prostate  Z12.5 PSA, screen     GASTROENTEROLOGY ADULT REF PROCEDURE ONLY       End of Life Planning:  Patient currently has an advanced directive: Yes.  Practitioner is supportive of decision.    COUNSELING:  Reviewed preventive health counseling, as reflected in patient instructions        Estimated body mass index is 37.61 kg/(m^2) as calculated from the following:    Height as of this encounter: 5' 5\" (1.651 m).    Weight as of this encounter: 226 lb (102.5 kg).  Weight management plan: Discussed healthy diet and exercise " guidelines and patient will follow up in 12 months in clinic to re-evaluate.   reports that he quit smoking about 15 years ago. He has a 40.00 pack-year smoking history. He has never used smokeless tobacco.      Appropriate preventive services were discussed with this patient, including applicable screening as appropriate for cardiovascular disease, diabetes, osteopenia/osteoporosis, and glaucoma.  As appropriate for age/gender, discussed screening for colorectal cancer, prostate cancer, breast cancer, and cervical cancer. Checklist reviewing preventive services available has been given to the patient.    Reviewed patients plan of care and provided an AVS. The Intermediate Care Plan ( asthma action plan, low back pain action plan, and migraine action plan) for Fausto meets the Care Plan requirement. This Care Plan has been established and reviewed with the Patient.    Counseling Resources:  ATP IV Guidelines  Pooled Cohorts Equation Calculator  Breast Cancer Risk Calculator  FRAX Risk Assessment  ICSI Preventive Guidelines  Dietary Guidelines for Americans, 2010  USDA's MyPlate  ASA Prophylaxis  Lung CA Screening    Tu Reyes MD, MD  [facility]

## 2018-05-08 NOTE — PATIENT INSTRUCTIONS
1) Electrical tracing of your heart today, start checking BLOOD PRESSURE at home when having the dizziness. If ongoing we will get the  holter monitor as we discussed    2) Labs downstairs today    3) Refilled medications    4) Ear will look more red before looking better, put topical antibiotic, aquaphor or Eucerin over areas if needed    5) Follow-up with back doctor as we discussed    Tu Reyes MD      Preventive Health Recommendations:   Male Ages 65 and over    Yearly exam:             See your health care provider every year in order to  o   Review health changes.   o   Discuss preventive care.    o   Review your medicines if your doctor has prescribed any.    Talk with your health care provider about whether you should have a test to screen for prostate cancer (PSA).    Every 3 years, have a diabetes test (fasting glucose). If you are at risk for diabetes, you should have this test more often.    Every 5 years, have a cholesterol test. Have this test more often if you are at risk for high cholesterol or heart disease.     Every 10 years, have a colonoscopy. Or, have a yearly FIT test (stool test). These exams will check for colon cancer.    Talk to with your health care provider about screening for Abdominal Aortic Aneurysm if you have a family history of AAA or have a history of smoking.    Shots:     Get a flu shot each year.     Get a tetanus shot every 10 years.     Talk to your doctor about your pneumonia vaccines. There are now two you should receive - Pneumovax (PPSV 23) and Prevnar (PCV 13).     Talk to your doctor about a shingles vaccine.     Talk to your doctor about the hepatitis B vaccine.  Nutrition:     Eat at least 5 servings of fruits and vegetables each day.     Eat whole-grain bread, whole-wheat pasta and brown rice instead of white grains and rice.     Talk to your provider about Calcium and Vitamin D.   Lifestyle    Exercise for at least 150 minutes a week (30 minutes a day, 5 days a  week). This will help you control your weight and prevent disease.     Limit alcohol to one drink per day.     No smoking.     Wear sunscreen to prevent skin cancer.     See your dentist every six months for an exam and cleaning.     See your eye doctor every 1 to 2 years to screen for conditions such as glaucoma, macular degeneration, cataracts, etc     Preventive Health Recommendations:       Male Ages 65 and over    Yearly exam:             See your health care provider every year in order to  o   Review health changes.   o   Discuss preventive care.    o   Review your medicines if your doctor has prescribed any.    Talk with your health care provider about whether you should have a test to screen for prostate cancer (PSA).    Every 3 years, have a diabetes test (fasting glucose). If you are at risk for diabetes, you should have this test more often.    Every 5 years, have a cholesterol test. Have this test more often if you are at risk for high cholesterol or heart disease.     Every 10 years, have a colonoscopy. Or, have a yearly FIT test (stool test). These exams will check for colon cancer.    Talk to with your health care provider about screening for Abdominal Aortic Aneurysm if you have a family history of AAA or have a history of smoking.  Shots:     Get a flu shot each year.     Get a tetanus shot every 10 years.     Talk to your doctor about your pneumonia vaccines. There are now two you should receive - Pneumovax (PPSV 23) and Prevnar (PCV 13).    Talk to your doctor about a shingles vaccine.     Talk to your doctor about the hepatitis B vaccine.  Nutrition:     Eat at least 5 servings of fruits and vegetables each day.     Eat whole-grain bread, whole-wheat pasta and brown rice instead of white grains and rice.     Talk to your doctor about Calcium and Vitamin D.   Lifestyle    Exercise for at least 150 minutes a week (30 minutes a day, 5 days a week). This will help you control your weight and prevent  disease.     Limit alcohol to one drink per day.     No smoking.     Wear sunscreen to prevent skin cancer.     See your dentist every six months for an exam and cleaning.     See your eye doctor every 1 to 2 years to screen for conditions such as glaucoma, macular degeneration and cataracts.

## 2018-05-08 NOTE — PROGRESS NOTES
"SUBJECTIVE:   CC: Fausto Farr is an 78 year old male who presents for preventative health visit.     HPI  {Outside tests to abstract? :241943}    {additional problems to add (Optional):255060}    Today's PHQ-2 Score:   PHQ-2 ( 1999 [employer]) 4/25/2017   Q1: Little interest or pleasure in doing things -   Q2: Feeling down, depressed or hopeless -   PHQ-2 Score -   Q1: Little interest or pleasure in doing things Not at all   Q2: Feeling down, depressed or hopeless Not at all   PHQ-2 Score 0       Abuse: Current or Past(Physical, Sexual or Emotional)- {YES/NO/NA:732137}  Do you feel safe in your environment - {YES/NO/NA:175346}    Social History   Substance Use Topics     Smoking status: Former Smoker     Packs/day: 1.00     Years: 40.00     Quit date: 10/23/2002     Smokeless tobacco: Never Used     Alcohol use Yes      Comment: a couple beers per week     No flowsheet data found.{add AUDIT responses (Optional) (A score of 7 for adult men is an indication of hazardous drinking; a score of 8 or more is an indication of an alcohol use disorder.  A score of 7 or more for adult women is an indication of hazardous drinking or an alchohol use disorder):254732}    Last PSA:   PSA   Date Value Ref Range Status   04/25/2017  0 - 4 ug/L Final    <0.01  Assay Method:  Chemiluminescence using Siemens Vista analyzer         Reviewed orders with patient. Reviewed health maintenance and updated orders accordingly - {Yes/No:641667::\"Yes\"}  {Chronicprobdata (Optional):604685}    Reviewed and updated as needed this visit by clinical staff         Reviewed and updated as needed this visit by Provider        {HISTORY OPTIONS (Optional):444272}    Review of Systems  {MALE ROS-adult preventive care package:566613::\"C: NEGATIVE for fever, chills, change in weight\",\"I: NEGATIVE for worrisome rashes, moles or lesions\",\"E: NEGATIVE for vision changes or irritation\",\"ENT: NEGATIVE for ear, mouth and throat problems\",\"R: NEGATIVE for " "significant cough or SOB\",\"CV: NEGATIVE for chest pain, palpitations or peripheral edema\",\"GI: NEGATIVE for nausea, abdominal pain, heartburn, or change in bowel habits\",\" male: negative for dysuria, hematuria, decreased urinary stream, erectile dysfunction, urethral discharge\",\"M: NEGATIVE for significant arthralgias or myalgia\",\"N: NEGATIVE for weakness, dizziness or paresthesias\",\"P: NEGATIVE for changes in mood or affect\"}    OBJECTIVE:   There were no vitals taken for this visit.    Physical Exam  {Exam Choices:797365}    ASSESSMENT/PLAN:   {Diag Picklist:175065}    COUNSELING:   {MALE COUNSELING MESSAGES:598798::\"Reviewed preventive health counseling, as reflected in patient instructions\"}    {BP Counseling- Complete if BP >= 120/80  (Optional):895943}     reports that he quit smoking about 15 years ago. He has a 40.00 pack-year smoking history. He has never used smokeless tobacco.  {Tobacco Cessation -- Complete if patient is a smoker (Optional):524114}  Estimated body mass index is 36.61 kg/(m^2) as calculated from the following:    Height as of 11/3/17: 5' 5\" (1.651 m).    Weight as of 11/3/17: 220 lb (99.8 kg).   {Weight Management Plan (ACO) Complete if BMI is abnormal-  Ages 18-64  BMI >24.9.  Age 65+ with BMI <23 or >30 (Optional):255965}    Counseling Resources:  ATP IV Guidelines  Pooled Cohorts Equation Calculator  FRAX Risk Assessment  ICSI Preventive Guidelines  Dietary Guidelines for Americans, 2010  USDA's MyPlate  ASA Prophylaxis  Lung CA Screening    Tu Reyes MD, MD  [facility]  "

## 2018-05-08 NOTE — LETTER
[facility]  [address]  [place] [address]                  [phone]   May 10, 2018    Fausto Farr  [address]  [place] [address]        Dear Fausto,     Here are the results from the recent Labs that we did.     Your triglycerides returned as elevated. I typically try lifestyle changes to help this improve if it is less than 500. Triglycerides can be found in butter, high fat meals, and also processed foods (carbohydrates that come in a box or bag). Cutting down on some of these foods and regular exercise can help these improve.     Your blood sugar was slightly elevated. Continuing with regular exercise and low carbohydrate diet can help this. We will recheck this with routine monitoring. Blood glucoses higher than 126 can mean diabetes, so we will have to follow this to ensure diabetes is not developing.     Your prostate testing was normal.     Your thyroid testing is normal.     Let me know if you have questions or concerns!     Sincerely,       Tu Reyes MD   Internal Medicine and Pediatrics         Results for orders placed or performed in visit on 05/08/18   Comprehensive metabolic panel   Result Value Ref Range    Sodium 140 133 - 144 mmol/L    Potassium 4.0 3.4 - 5.3 mmol/L    Chloride 106 94 - 109 mmol/L    Carbon Dioxide 27 20 - 32 mmol/L    Anion Gap 7 3 - 14 mmol/L    Glucose 103 (H) 70 - 99 mg/dL    Urea Nitrogen 14 7 - 30 mg/dL    Creatinine 0.91 0.66 - 1.25 mg/dL    GFR Estimate 81 >60 mL/min/1.7m2    GFR Estimate If Black >90 >60 mL/min/1.7m2    Calcium 8.6 8.5 - 10.1 mg/dL    Bilirubin Total 0.5 0.2 - 1.3 mg/dL    Albumin 3.8 3.4 - 5.0 g/dL    Protein Total 7.4 6.8 - 8.8 g/dL    Alkaline Phosphatase 43 40 - 150 U/L    ALT 32 0 - 70 U/L    AST 29 0 - 45 U/L   PSA, screen   Result Value Ref Range    PSA <0.01 0 - 4 ug/L   Lipid panel reflex to direct LDL Fasting   Result Value Ref Range    Cholesterol 119 <200 mg/dL    Triglycerides 176 (H)  <150 mg/dL    HDL Cholesterol 32 (L) >39 mg/dL    LDL Cholesterol Calculated 52 <100 mg/dL    Non HDL Cholesterol 87 <130 mg/dL   CBC with platelets   Result Value Ref Range    WBC 8.2 4.0 - 11.0 10e9/L    RBC Count 4.26 (L) 4.4 - 5.9 10e12/L    Hemoglobin 12.8 (L) 13.3 - 17.7 g/dL    Hematocrit 39.7 (L) 40.0 - 53.0 %    MCV 93 78 - 100 fl    MCH 30.0 26.5 - 33.0 pg    MCHC 32.2 31.5 - 36.5 g/dL    RDW 13.4 10.0 - 15.0 %    Platelet Count 257 150 - 450 10e9/L   TSH with free T4 reflex   Result Value Ref Range    TSH 0.96 0.40 - 4.00 mU/L

## 2018-05-15 ENCOUNTER — TELEPHONE (OUTPATIENT)
Dept: PEDIATRICS | Facility: CLINIC | Age: 78
End: 2018-05-15

## 2018-05-15 DIAGNOSIS — L57.0 ACTINIC KERATOSIS: Primary | ICD-10-CM

## 2018-05-15 NOTE — TELEPHONE ENCOUNTER
Your provider has referred you to: [facility] [place]: [facility] [place] [phone]   [email or web address]    Notified patient.   Chaparrita Fan RN

## 2018-05-15 NOTE — TELEPHONE ENCOUNTER
Patient calling that the spot on his ear is very red and hurts when he lays on it at night. States he was treated and told that if it was not better to laina for referral.  Do not see any mention of this in the last office visit note.  Would like a referral to dermatology. Would like to stay in the [place] area if possible. Order pended for signature if appropriate.   Chaparrita Fan RN

## 2018-05-17 ENCOUNTER — TRANSFERRED RECORDS (OUTPATIENT)
Dept: HEALTH INFORMATION MANAGEMENT | Facility: CLINIC | Age: 78
End: 2018-05-17

## 2018-05-17 ENCOUNTER — HOSPITAL ENCOUNTER (INPATIENT)
Facility: CLINIC | Age: 78
LOS: 4 days | Discharge: HOME | DRG: 872 | End: 2018-05-22
Attending: EMERGENCY MEDICINE | Admitting: HOSPITALIST
Payer: MEDICARE

## 2018-05-17 ENCOUNTER — APPOINTMENT (OUTPATIENT)
Dept: GENERAL RADIOLOGY | Facility: CLINIC | Age: 78
DRG: 872 | End: 2018-05-17
Attending: EMERGENCY MEDICINE
Payer: MEDICARE

## 2018-05-17 DIAGNOSIS — A41.9 SEVERE SEPSIS (H): ICD-10-CM

## 2018-05-17 DIAGNOSIS — A40.1 SEPSIS DUE TO GROUP B STREPTOCOCCUS (H): Primary | ICD-10-CM

## 2018-05-17 DIAGNOSIS — I38 VALVULAR HEART DISEASE: ICD-10-CM

## 2018-05-17 DIAGNOSIS — Z95.2 S/P AORTIC VALVE REPLACEMENT: ICD-10-CM

## 2018-05-17 DIAGNOSIS — Z95.2 S/P MITRAL VALVE REPLACEMENT: ICD-10-CM

## 2018-05-17 DIAGNOSIS — R65.20 SEVERE SEPSIS (H): ICD-10-CM

## 2018-05-17 LAB
ALBUMIN UR-MCNC: 10 MG/DL
APPEARANCE UR: CLEAR
BILIRUB UR QL STRIP: NEGATIVE
COLOR UR AUTO: YELLOW
GLUCOSE UR STRIP-MCNC: NEGATIVE MG/DL
HGB UR QL STRIP: NEGATIVE
KETONES UR STRIP-MCNC: NEGATIVE MG/DL
LACTATE BLD-SCNC: 2.1 MMOL/L (ref 0.7–2)
LEUKOCYTE ESTERASE UR QL STRIP: NEGATIVE
MUCOUS THREADS #/AREA URNS LPF: PRESENT /LPF
NITRATE UR QL: NEGATIVE
PH UR STRIP: 5.5 PH (ref 5–7)
RBC #/AREA URNS AUTO: 0 /HPF (ref 0–2)
SOURCE: ABNORMAL
SP GR UR STRIP: 1.02 (ref 1–1.03)
UROBILINOGEN UR STRIP-MCNC: NORMAL MG/DL (ref 0–2)
WBC #/AREA URNS AUTO: <1 /HPF (ref 0–5)

## 2018-05-17 PROCEDURE — 96361 HYDRATE IV INFUSION ADD-ON: CPT

## 2018-05-17 PROCEDURE — 25000128 H RX IP 250 OP 636: Performed by: EMERGENCY MEDICINE

## 2018-05-17 PROCEDURE — 93005 ELECTROCARDIOGRAM TRACING: CPT

## 2018-05-17 PROCEDURE — 71046 X-RAY EXAM CHEST 2 VIEWS: CPT

## 2018-05-17 PROCEDURE — 83605 ASSAY OF LACTIC ACID: CPT | Performed by: EMERGENCY MEDICINE

## 2018-05-17 PROCEDURE — 99285 EMERGENCY DEPT VISIT HI MDM: CPT | Mod: 25

## 2018-05-17 PROCEDURE — 96374 THER/PROPH/DIAG INJ IV PUSH: CPT

## 2018-05-17 PROCEDURE — 81001 URINALYSIS AUTO W/SCOPE: CPT | Performed by: EMERGENCY MEDICINE

## 2018-05-17 RX ORDER — SODIUM CHLORIDE 9 MG/ML
1000 INJECTION, SOLUTION INTRAVENOUS CONTINUOUS
Status: DISCONTINUED | OUTPATIENT
Start: 2018-05-17 | End: 2018-05-18

## 2018-05-17 RX ADMIN — SODIUM CHLORIDE 1000 ML: 9 INJECTION, SOLUTION INTRAVENOUS at 23:43

## 2018-05-17 RX ADMIN — VANCOMYCIN HYDROCHLORIDE 2000 MG: 1 INJECTION, POWDER, LYOPHILIZED, FOR SOLUTION INTRAVENOUS at 23:58

## 2018-05-17 RX ADMIN — SODIUM CHLORIDE 1000 ML: 9 INJECTION, SOLUTION INTRAVENOUS at 23:54

## 2018-05-17 ASSESSMENT — ENCOUNTER SYMPTOMS
FEVER: 1
HEADACHES: 0
FATIGUE: 1
SHORTNESS OF BREATH: 1
ABDOMINAL DISTENTION: 0

## 2018-05-17 NOTE — IP AVS SNAPSHOT
MRN:[id number]                      After Visit Summary   5/17/2018    Fausto Farr    MRN: [id number]           Thank you!     Thank you for choosing [facility] for your care. Our goal is always to provide you with excellent care. Hearing back from our patients is one way we can continue to improve our services. Please take a few minutes to complete the written survey that you may receive in the mail after you visit with us. Thank you!        Patient Information     Date Of Birth          1940        Designated Caregiver       Most Recent Value    Caregiver    Will someone help with your care after discharge? yes    Name of designated caregiver flow sheets    Phone number of caregiver See sheet [[address],  PRAVIN Whitlock]    Caregiver address See sheet [[phone]]      About your hospital stay     You were admitted on:  May 18, 2018 [facility]    You were discharged on:  May 22, 2018       Who to Call     For medical emergencies, please call 911.  For non-urgent questions about your medical care, please call your primary care provider or clinic, [phone]          Attending Provider     Provider Specialty    Roge Nam DO Emergency Medicine    Andres Morris MD Internal Medicine       Primary Care Provider Office Phone # Fax #    Tu Reyes [phone]      After Care Instructions     Activity       Your activity upon discharge: activity as tolerated            Diet       Follow this diet upon discharge: Orders Placed This Encounter  Low salt, low saturated fat/cholesterol diet                  Follow-up Appointments     Follow-up and recommended labs and tests        Follow up with PCP in one week.  INR on 05/24.                  Your next 10 appointments already scheduled     May 23, 2018 10:00 AM CDT   Level 1 with  INFUSION CHAIR 9   [facility] Infusion  "Services ([facility])    [facility]  [address]  [facility] [id number] [phone]            May 25, 2018 11:45 AM CDT   Anticoagulation Visit with  ANTICOAGULATION CLINIC   [facility]    [address] [address] [phone]            May 31, 2018  1:10 PM CDT   Office Visit with Tu Reyes MD   [facility] ([facility])    [address]  [address]  [facility] [address] [phone]           Bring a current list of meds and any records pertaining to this visit. For Physicals, please bring immunization records and any forms needing to be filled out. Please arrive 10 minutes early to complete paperwork.            Oct 19, 2018  9:00 AM CDT   Return Sleep Patient with Isrrael Dobbins MD   [facility] ([facility])    [address]  [facility] [id number] [phone]              Pending Results     Date and Time Order Name Status Description    5/22/2018 0000 Blood culture Preliminary     5/21/2018 0000 Blood culture Preliminary     5/20/2018 0000 Blood culture Preliminary     5/19/2018 0000 Blood culture Preliminary     5/18/2018 0120 Blood culture Preliminary             Statement of Approval     Ordered          05/22/18 0757  I have reviewed and agree with all the recommendations and orders detailed in this document.  EFFECTIVE NOW     Approved and electronically signed by:  Eden Weston MD             Admission Information     Date & Time Provider Department Dept. Phone    5/17/2018 Andres Morris MD [facility] Coronary Care Unit [phone]      Your Vitals Were     Blood Pressure Pulse Temperature Respirations Height Weight    165/101 64 97.8  F (36.6  C) (Oral) 18 1.664 m (5' 5.5\") 100.2 kg (220 lb 14.4 oz)    Pulse Oximetry BMI (Body Mass Index)    " "            96% 36.2 kg/m2          [employer] Information     [employer] lets you send messages to your doctor, view your test results, renew your prescriptions, schedule appointments and more. To sign up, go to [email or web address] . Click on \"Log in\" on the left side of the screen, which will take you to the Welcome page. Then click on \"Sign up Now\" on the right side of the page.     You will be asked to enter the access code listed below, as well as some personal information. Please follow the directions to create your username and password.     Your access code is: [email or web address]  Expires: 2018  8:30 AM     Your access code will  in 90 days. If you need help or a new code, please call your [place] clinic or [phone].        Care EveryWhere ID     This is your Care EveryWhere ID. This could be used by other organizations to access your [place] medical records  [id number]        Equal Access to Services     JOHN MARTINS : Hadii ted bermudezo Sobridger, waaxda luqadaha, qaybta kaalmada adediazyarenetta, denny blake . So [facility] [phone].    ATENCIÓN: Si habla español, tiene a brown disposición servicios gratuitos de asistencia lingüística. Llame al [phone].    We comply with applicable federal civil rights laws and [place] laws. We do not discriminate on the basis of race, color, national origin, age, disability, sex, sexual orientation, or gender identity.               Review of your medicines      START taking        Dose / Directions    cefTRIAXone 1 GM vial   Commonly known as:  ROCEPHIN        Dose:  2000 mg   Inject 2 g (2,000 mg) into the vein daily for 10 days CBC with differential, creatinine, SGOT weekly while on this medication to be faxed to Dr. Leija office.   Quantity:  600 mL   Refills:  0         CONTINUE these medicines which have NOT CHANGED        Dose / Directions    ASPIRIN NOT PRESCRIBED   Commonly known as:  INTENTIONAL   Used for:  Long-term " (current) use of anticoagulants, S/P aortic valve replacement        Please choose reason not prescribed, below   Quantity:  0 each   Refills:  0       betamethasone valerate 0.1 % lotion   Commonly known as:  VALISONE   Used for:  Eczema, unspecified type        APPLY TOPICALLY TWICE DAILY PRN   Quantity:  60 mL   Refills:  3       ezetimibe 10 MG tablet   Commonly known as:  ZETIA   Used for:  Mixed hyperlipidemia        Dose:  10 mg   Take 1 tablet (10 mg) by mouth daily   Quantity:  90 tablet   Refills:  3       fluocinonide 0.05 % ointment   Commonly known as:  LIDEX   Used for:  Eczema, unspecified type        2- 30 gram tubes.  Apply twice a day as needed.   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)        Dose:  1 tablet   Take 1 tablet (800 mg) by mouth 2 times daily   Quantity:  180 tablet   Refills:  3       metoprolol tartrate 25 MG tablet   Commonly known as:  LOPRESSOR   Used for:  Coronary artery disease involving coronary bypass graft 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        Dose:  25 mg   Take 1 tablet (25 mg) by mouth 2 times daily   Quantity:  180 tablet   Refills:  3       rosuvastatin 40 MG tablet   Commonly known as:  CRESTOR   Used for:  Hyperlipidemia, unspecified hyperlipidemia type        Dose:  40 mg   Take 1 tablet (40 mg) by mouth daily   Quantity:  90 tablet   Refills:  3       tamsulosin 0.4 MG capsule   Commonly known as:  FLOMAX   Used for:  Urinary retention        Dose:  0.4 mg   Take 1 capsule (0.4 mg) by mouth daily   Quantity:  90 capsule   Refills:  3       warfarin 5 MG tablet   Commonly known as:  COUMADIN   Used for:  Long-term (current) use of  anticoagulants, S/P aortic valve replacement        Take 1 1/2 tablets (7.5 mg) by mouth TWTFSS; 1 tablet (5 mg) on Mondays OR as directed by INR Clinic   Quantity:  135 tablet   Refills:  3            Where to get your medicines      Some of these will need a paper prescription and others can be bought over the counter. Ask your nurse if you have questions.     You don't need a prescription for these medications     cefTRIAXone 1 GM vial                Protect others around you: Learn how to safely use, store and throw away your medicines at www.disposemymeds.org.        ANTIBIOTIC INSTRUCTION     You've Been Prescribed an Antibiotic - Now What?  Your healthcare team thinks that you or your loved one might have an infection. Some infections can be treated with antibiotics, which are powerful, life-saving drugs. Like all medications, antibiotics have side effects and should only be used when necessary. There are some important things you should know about your antibiotic treatment.      Your healthcare team may run tests before you start taking an antibiotic.    Your team may take samples (e.g., from your blood, urine or other areas) to run tests to look for bacteria. These test can be important to determine if you need an antibiotic at all and, if you do, which antibiotic will work best.      Within a few days, your healthcare team might change or even stop your antibiotic.    Your team may start you on an antibiotic while they are working to find out what is making you sick.    Your team might change your antibiotic because test results show that a different antibiotic would be better to treat your infection.    In some cases, once your team has more information, they learn that you do not need an antibiotic at all. They may find out that you don't have an infection, or that the antibiotic you're taking won't work against your infection. For example, an infection caused by a virus can't be treated with  antibiotics. Staying on an antibiotic when you don't need it is more likely to be harmful than helpful.      You may experience side effects from your antibiotic.    Like all medications, antibiotics have side effects. Some of these can be serious.    Let you healthcare team know if you have any known allergies when you are admitted to the hospital.    One significant side effect of nearly all antibiotics is the risk of severe and sometimes deadly diarrhea caused by Clostridium difficile (C. Difficile). This occurs when a person takes antibiotics because some good germs are destroyed. Antibiotic use allows C. diificile to take over, putting patients at high risk for this serious infection.    As a patient or caregiver, it is important to understand your or your loved one's antibiotic treatment. It is especially important for caregivers to speak up when patients can't speak for themselves. Here are some important questions to ask your healthcare team.    What infection is this antibiotic treating and how do you know I have that infection?    What side effects might occur from this antibiotic?    How long will I need to take this antibiotic?    Is it safe to take this antibiotic with other medications or supplements (e.g., vitamins) that I am taking?     Are there any special directions I need to know about taking this antibiotic? For example, should I take it with food?    How will I be monitored to know whether my infection is responding to the antibiotic?    What tests may help to make sure the right antibiotic is prescribed for me?      Information provided by:  www.cdc.gov/getsmart  U.S. Department of Health and Human Services  Centers for disease Control and Prevention  National Center for Emerging and Zoonotic Infectious Diseases  Division of Healthcare Quality Promotion             Medication List: This is a list of all your medications and when to take them. Check marks below indicate your daily home  schedule. Keep this list as a reference.      Medications           Morning Afternoon Evening Bedtime As Needed    ASPIRIN NOT PRESCRIBED   Commonly known as:  INTENTIONAL   Please choose reason not prescribed, below                                betamethasone valerate 0.1 % lotion   Commonly known as:  VALISONE   APPLY TOPICALLY TWICE DAILY PRN                                      cefTRIAXone 1 GM vial   Commonly known as:  ROCEPHIN   Inject 2 g (2,000 mg) into the vein daily for 10 days CBC with differential, creatinine, SGOT weekly while on this medication to be faxed to Dr. Leija office.   Last time this was given:  2 g on 5/22/2018 10:29 AM                                   ezetimibe 10 MG tablet   Commonly known as:  ZETIA   Take 1 tablet (10 mg) by mouth daily   Last time this was given:  10 mg on 5/22/2018 10:28 AM                                   fluocinonide 0.05 % ointment   Commonly known as:  LIDEX   2- 30 gram tubes.  Apply twice a day as needed.                                   furosemide 40 MG tablet   Commonly known as:  LASIX   Take 0.5 tablets (20 mg) by mouth daily   Last time this was given:  20 mg on 5/22/2018 10:27 AM                                   mesalamine 800 MG EC tablet   Commonly known as:  ASACOL HD   Take 1 tablet (800 mg) by mouth 2 times daily   Last time this was given:  800 mg on 5/22/2018 10:26 AM                                      metoprolol tartrate 25 MG tablet   Commonly known as:  LOPRESSOR   Take 1 tablet (25 mg) by mouth 2 times daily   Last time this was given:  25 mg on 5/22/2018 10:26 AM                                      rosuvastatin 40 MG tablet   Commonly known as:  CRESTOR   Take 1 tablet (40 mg) by mouth daily   Last time this was given:  40 mg on 5/22/2018 10:27 AM                                   tamsulosin 0.4 MG capsule   Commonly known as:  FLOMAX   Take 1 capsule (0.4 mg) by mouth daily   Last time this was given:  0.4 mg on 5/22/2018 10:27 AM                                    warfarin 5 MG tablet   Commonly known as:  COUMADIN   Take 1 1/2 tablets (7.5 mg) by mouth TWTFSS; 1 tablet (5 mg) on Mondays OR as directed by INR Clinic   Last time this was given:  7.5 mg on 5/21/2018  6:03 PM

## 2018-05-17 NOTE — IP AVS SNAPSHOT
[facility]    [address]    [place] [address]    Phone:  [phone]                                       After Visit Summary   5/17/2018    Fausto Farr    MRN: [id number]           After Visit Summary Signature Page     I have received my discharge instructions, and my questions have been answered. I have discussed any challenges I see with this plan with the nurse or doctor.    ..........................................................................................................................................  Patient/Patient Representative Signature      ..........................................................................................................................................  Patient Representative Print Name and Relationship to Patient    ..................................................               ................................................  Date                                            Time    ..........................................................................................................................................  Reviewed by Signature/Title    ...................................................              ..............................................  Date                                                            Time

## 2018-05-17 NOTE — IP AVS SNAPSHOT
"[facility] CORONARY CARE UNIT: [phone]                                              INTERAGENCY TRANSFER FORM - PHYSICIAN ORDERS   2018                    Hospital Admission Date: 2018  LIANG VAUGHN   : 1940  Sex: Male        Attending Provider: Andres Morris MD     Allergies:  Bees    Infection:  MRSA-Contact Isolation   Service:  HOSPITALIST    Ht:  1.664 m (5' 5.5\")   Wt:  100.2 kg (220 lb 14.4 oz)   Admission Wt:  102.5 kg (226 lb)    BMI:  36.2 kg/m 2   BSA:  2.15 m 2            Patient PCP Information     Provider PCP Type    Tu Reyes MD, MD General      ED Clinical Impression     Diagnosis Description Comment Added By Time Added    Severe sepsis (H) [A41.9, R65.20] Severe sepsis (H) [A41.9, R65.20]  [place] 2018 12:17 AM      Hospital Problems as of 2018              Priority Class Noted POA    Sepsis (H) Medium  2018 Yes    * (Principal)Sepsis due to group B Streptococcus (H) High  2018 Yes      Non-Hospital Problems as of 2018              Priority Class Noted    Rotator cuff (capsule) sprain Medium  5/10/2010    Somatic dysfunction of pelvic region Medium  10/18/2012    Contact dermatitis and other eczema, due to unspecified cause Medium  10/18/2012    Ulcerative colitis (H) Medium  10/18/2012    Atrial fibrillation (H) Medium  10/18/2012    Other specified anemias Medium  10/18/2012    Gastric ulcer Medium  10/18/2012    Bilateral low back pain with left-sided sciatica Medium  10/18/2012    Nonallopathic lesion of lumbar region Medium  10/18/2012    Nonallopathic lesion of sacral region Medium  10/18/2012    Diaphragmatic hernia Medium  10/18/2012    Abdominal pain, epigastric Medium  10/18/2012    Malaise and fatigue Medium  10/18/2012    Nocturia Medium  10/18/2012    Nonallopathic lesion of cervical region Medium  10/18/2012    Nonallopathic lesion of thoracic region Medium  10/18/2012    Cellulitis and abscess Medium "  10/18/2012    Malignant neoplasm of prostate (H) Medium  10/18/2012    Abdominal aortic aneurysm (H) Medium  10/18/2012    Nonallopathic lesion of rib cage Medium  10/18/2012    Coronary artery disease Medium  10/18/2012    AF (atrial fibrillation) (H) Medium  10/18/2012    Paroxysmal atrial fibrillation (H) Medium  10/18/2012    Vitamin D deficiency disease Medium  8/6/2013    Anemia relative at 12.5 in 8-13  Medium  8/6/2013    Essential hypertension, benign Medium  8/6/2013    Hyperlipidemia LDL goal <100 Medium  8/6/2013    Prostate cancer (H) Medium  8/6/2013    Glucose intolerance (impaired glucose tolerance) Medium  8/6/2013    Back pain-since 1980's Medium  8/6/2013    Sciatica of left side since 2000 Medium  8/6/2013    Valvular heart disease Medium  9/16/2013    Heart murmur Medium  Unknown    MRSA (methicillin resistant Staphylococcus aureus) Medium  10/17/2013    Health Care Home Medium  10/24/2013    Urinary retention Medium  12/4/2013    ACP (advance care planning) Medium  10/9/2015    S/P aortic valve replacement Medium  Unknown    S/P CABG (coronary artery bypass graft) Medium  Unknown    Stented coronary artery Medium  Unknown    Mixed hyperlipidemia Medium  Unknown    PVD (peripheral vascular disease) (H) Medium  Unknown    Abnormal ECG Medium  Unknown    Personal history of tobacco use, presenting hazards to health Medium  10/28/2015    Spinal stenosis of lumbar region without neurogenic claudication Medium  10/28/2015    S/P mitral valve replacement Medium  10/28/2015    RBBB with left anterior fascicular block Medium  10/28/2015    S/P lumbar spinal fusion Medium  10/29/2015    Long-term (current) use of anticoagulants [Z79.01] Medium  3/21/2016    Chronic systolic congestive heart failure (H) Medium  3/21/2016    GAGE (obstructive sleep apnea) Medium  10/2/2017      Code Status History     Date Active Date Inactive Code Status Order ID Comments User Context    5/22/2018  7:56 AM 5/22/2018  7:56  AM Full Code [id number]  Eden Weston MD Outpatient    5/18/2018  1:20 AM 5/22/2018  7:56 AM Full Code [id number]  Andres Morris MD Inpatient    10/29/2015  9:45 PM 11/7/2015  3:02 PM Full Code [id number]  Walt Garcia MD Inpatient    11/30/2013 12:18 PM 10/29/2015  9:45 PM Full Code [id number]  Natalya Vásquez MD Outpatient    4/16/2013  3:52 PM 11/30/2013 12:18 PM Full Code [id number]  Rosalino Tovar DO Outpatient    4/12/2013  5:31 PM 4/16/2013  3:52 PM Full Code [id number]  Patty Shell MD Inpatient         Medication Review      START taking        Dose / Directions Comments    cefTRIAXone 1 GM vial   Commonly known as:  ROCEPHIN        Dose:  2000 mg   Inject 2 g (2,000 mg) into the vein daily for 10 days CBC with differential, creatinine, SGOT weekly while on this medication to be faxed to Dr. Leija office.   Quantity:  600 mL   Refills:  0          CONTINUE these medications which have NOT CHANGED        Dose / Directions Comments    ASPIRIN NOT PRESCRIBED   Commonly known as:  INTENTIONAL   Used for:  Long-term (current) use of anticoagulants, S/P aortic valve replacement        Please choose reason not prescribed, below   Quantity:  0 each   Refills:  0        betamethasone valerate 0.1 % lotion   Commonly known as:  VALISONE   Used for:  Eczema, unspecified type        APPLY TOPICALLY TWICE DAILY PRN   Quantity:  60 mL   Refills:  3        ezetimibe 10 MG tablet   Commonly known as:  ZETIA   Used for:  Mixed hyperlipidemia        Dose:  10 mg   Take 1 tablet (10 mg) by mouth daily   Quantity:  90 tablet   Refills:  3        fluocinonide 0.05 % ointment   Commonly known as:  LIDEX   Used for:  Eczema, unspecified type        2- 30 gram tubes.  Apply twice a day as needed.   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)        Dose:  1 tablet   Take 1 tablet (800 mg) by mouth 2 times daily   Quantity:  180 tablet   Refills:  3        metoprolol tartrate 25 MG tablet   Commonly known as:  LOPRESSOR   Used for:  Coronary artery disease involving coronary bypass graft 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        Dose:  25 mg   Take 1 tablet (25 mg) by mouth 2 times daily   Quantity:  180 tablet   Refills:  3        rosuvastatin 40 MG tablet   Commonly known as:  CRESTOR   Used for:  Hyperlipidemia, unspecified hyperlipidemia type        Dose:  40 mg   Take 1 tablet (40 mg) by mouth daily   Quantity:  90 tablet   Refills:  3        tamsulosin 0.4 MG capsule   Commonly known as:  FLOMAX   Used for:  Urinary retention        Dose:  0.4 mg   Take 1 capsule (0.4 mg) by mouth daily   Quantity:  90 capsule   Refills:  3        warfarin 5 MG tablet   Commonly known as:  COUMADIN   Used for:  Long-term (current) use of anticoagulants, S/P aortic valve replacement        Take 1 1/2 tablets (7.5 mg) by mouth TWTFSS; 1 tablet (5 mg) on Mondays OR as directed by INR Clinic   Quantity:  135 tablet   Refills:  3                After Care     Activity       Your activity upon discharge: activity as tolerated       Diet       Follow this diet upon discharge: Orders Placed This Encounter  Low salt, low saturated fat/cholesterol diet             Referrals     Home care nursing referral       RN skilled nursing visit. RN to teach administration of IV medications.    Your provider has ordered home care nursing services. If you have not been contacted within 2 days of your discharge please call the inpatient department phone number at [phone] .       Home infusion referral       Your provider has referred you to: PREFERRED PROVIDERS:    Local Address (if different from home address):  N/A    Anticipated Length of Therapy: 10 days    Home Infusion Pharmacist to adjust therapy based on labs and clinical assessments: No (Home Infusion will call for order)    Labs:  May draw labs from Venous Catheter: Yes  Home Infusion Pharmacist to order labs based on therapy type and clinical assessments: Yes  Call/Fax Lab Results to: Dr. Leija of infectious disease    Agency Staff to assess nursing needs for Infusion Therapy.    Access Device Management:  IV Access Type: Midline  Flush with Heparin and Normal Saline IVP PRN and routine site care (per agency protocol) to maintain access device? Yes              MD face to face encounter       Documentation of Face to Face and Certification for Home Health Services    I certify that patient: Fausto Farr is under my care and that I, or a nurse practitioner or physician's assistant working with me, had a face-to-face encounter that meets the physician face-to-face encounter requirements with this patient on: 5/22/2018.    This encounter with the patient was in whole, or in part, for the following medical condition, which is the primary reason for home health care: IV antibiotic infusion.    I certify that, based on my findings, the following services are medically necessary home health services: Nursing.    My clinical findings support the need for the above services because: Nurse is needed: for IV antibiotic infusion.    Further, I certify that my clinical findings support that this patient is homebound (i.e. absences from home require considerable and taxing effort and are for medical reasons or [employer] services or infrequently or of short duration when for other reasons) because: Requires assistance of another person or specialized equipment to access medical services because patient: Is unable to exit home safely on own.    Based on the above findings. I certify that this patient is confined to the home and needs intermittent skilled nursing care,  physical therapy and/or speech therapy.  The patient is under my care, and I have initiated the establishment of the plan of care.  This patient will be followed by a physician who will periodically review the plan of care.  Physician/Provider to provide follow up care: Tu Reyes    Attending hospital physician (the Medicare certified PECOS provider): Eden Weston  Physician Signature: See electronic signature associated with these discharge orders.  Date: 5/22/2018                  Your next 10 appointments already scheduled     May 23, 2018 10:00 AM CDT   Level 1 with  INFUSION CHAIR 9   [facility] ([facility])    [facility]  [address]  [facility] [address]   [phone]            Oct 19, 2018  9:00 AM CDT   Return Sleep Patient with Isrrael Dobbins MD   [facility] ([facility])    [address]  [facility] [address]   [phone]              Follow-Up Appointment Instructions     Future Labs/Procedures    Follow-up and recommended labs and tests      Comments:    Follow up with PCP in one week.  INR on 05/24.      Follow-Up Appointment Instructions     Follow-up and recommended labs and tests        Follow up with PCP in one week.  INR on 05/24.             Statement of Approval     Ordered          05/22/18 0757  I have reviewed and agree with all the recommendations and orders detailed in this document.  EFFECTIVE NOW     Approved and electronically signed by:  Eden Weston MD

## 2018-05-18 ENCOUNTER — APPOINTMENT (OUTPATIENT)
Dept: CARDIOLOGY | Facility: CLINIC | Age: 78
DRG: 872 | End: 2018-05-18
Attending: HOSPITALIST
Payer: MEDICARE

## 2018-05-18 PROBLEM — A41.9 SEPSIS (H): Status: ACTIVE | Noted: 2018-05-18

## 2018-05-18 LAB
ANION GAP SERPL CALCULATED.3IONS-SCNC: 8 MMOL/L (ref 3–14)
BUN SERPL-MCNC: 19 MG/DL (ref 7–30)
CALCIUM SERPL-MCNC: 7.8 MG/DL (ref 8.5–10.1)
CHLORIDE SERPL-SCNC: 109 MMOL/L (ref 94–109)
CO2 SERPL-SCNC: 24 MMOL/L (ref 20–32)
CREAT SERPL-MCNC: 1.13 MG/DL (ref 0.66–1.25)
ERYTHROCYTE [DISTWIDTH] IN BLOOD BY AUTOMATED COUNT: 13.8 % (ref 10–15)
FLUAV+FLUBV RNA SPEC QL NAA+PROBE: NEGATIVE
FLUAV+FLUBV RNA SPEC QL NAA+PROBE: NEGATIVE
GFR SERPL CREATININE-BSD FRML MDRD: 63 ML/MIN/1.7M2
GLUCOSE SERPL-MCNC: 119 MG/DL (ref 70–99)
HCT VFR BLD AUTO: 35.6 % (ref 40–53)
HGB BLD-MCNC: 11.8 G/DL (ref 13.3–17.7)
INR PPP: 3.71 (ref 0.86–1.14)
INTERPRETATION ECG - MUSE: NORMAL
LACTATE BLD-SCNC: 0.6 MMOL/L (ref 0.4–1.9)
LACTATE BLD-SCNC: 1.4 MMOL/L (ref 0.7–2)
LACTATE BLD-SCNC: 1.5 MMOL/L (ref 0.7–2)
MCH RBC QN AUTO: 29.9 PG (ref 26.5–33)
MCHC RBC AUTO-ENTMCNC: 33.1 G/DL (ref 31.5–36.5)
MCV RBC AUTO: 90 FL (ref 78–100)
PLATELET # BLD AUTO: 181 10E9/L (ref 150–450)
POTASSIUM SERPL-SCNC: 4.4 MMOL/L (ref 3.4–5.3)
PROCALCITONIN SERPL-MCNC: 2.19 NG/ML
RBC # BLD AUTO: 3.94 10E12/L (ref 4.4–5.9)
RSV RNA SPEC NAA+PROBE: NEGATIVE
SODIUM SERPL-SCNC: 141 MMOL/L (ref 133–144)
SPECIMEN SOURCE: NORMAL
TROPONIN I SERPL-MCNC: 0.03 UG/L (ref 0–0.04)
TROPONIN I SERPL-MCNC: 0.04 UG/L (ref 0–0.04)
WBC # BLD AUTO: 22.1 10E9/L (ref 4–11)

## 2018-05-18 PROCEDURE — 87040 BLOOD CULTURE FOR BACTERIA: CPT | Performed by: HOSPITALIST

## 2018-05-18 PROCEDURE — 99223 1ST HOSP IP/OBS HIGH 75: CPT | Mod: AI | Performed by: HOSPITALIST

## 2018-05-18 PROCEDURE — 40000884 ZZH STATISTIC STEP DOWN HRS NIGHT

## 2018-05-18 PROCEDURE — 84145 PROCALCITONIN (PCT): CPT | Performed by: HOSPITALIST

## 2018-05-18 PROCEDURE — A9270 NON-COVERED ITEM OR SERVICE: HCPCS | Mod: GY | Performed by: HOSPITALIST

## 2018-05-18 PROCEDURE — 84484 ASSAY OF TROPONIN QUANT: CPT | Performed by: HOSPITALIST

## 2018-05-18 PROCEDURE — 93306 TTE W/DOPPLER COMPLETE: CPT | Mod: 26 | Performed by: INTERNAL MEDICINE

## 2018-05-18 PROCEDURE — 80048 BASIC METABOLIC PNL TOTAL CA: CPT | Performed by: HOSPITALIST

## 2018-05-18 PROCEDURE — 25000128 H RX IP 250 OP 636: Performed by: HOSPITALIST

## 2018-05-18 PROCEDURE — 87631 RESP VIRUS 3-5 TARGETS: CPT | Performed by: INTERNAL MEDICINE

## 2018-05-18 PROCEDURE — 94660 CPAP INITIATION&MGMT: CPT

## 2018-05-18 PROCEDURE — 83605 ASSAY OF LACTIC ACID: CPT | Performed by: HOSPITALIST

## 2018-05-18 PROCEDURE — 36415 COLL VENOUS BLD VENIPUNCTURE: CPT | Performed by: HOSPITALIST

## 2018-05-18 PROCEDURE — 85610 PROTHROMBIN TIME: CPT | Performed by: HOSPITALIST

## 2018-05-18 PROCEDURE — 21000000 ZZH R&B IMCU HEART CARE

## 2018-05-18 PROCEDURE — 25500064 ZZH RX 255 OP 636: Performed by: HOSPITALIST

## 2018-05-18 PROCEDURE — 85027 COMPLETE CBC AUTOMATED: CPT | Performed by: HOSPITALIST

## 2018-05-18 PROCEDURE — 40000275 ZZH STATISTIC RCP TIME EA 10 MIN

## 2018-05-18 PROCEDURE — 25000132 ZZH RX MED GY IP 250 OP 250 PS 637: Mod: GY | Performed by: HOSPITALIST

## 2018-05-18 RX ORDER — POTASSIUM CHLORIDE 29.8 MG/ML
20 INJECTION INTRAVENOUS
Status: DISCONTINUED | OUTPATIENT
Start: 2018-05-18 | End: 2018-05-22 | Stop reason: HOSPADM

## 2018-05-18 RX ORDER — MESALAMINE 800 MG/1
800 TABLET, DELAYED RELEASE ORAL 2 TIMES DAILY
Status: DISCONTINUED | OUTPATIENT
Start: 2018-05-18 | End: 2018-05-22 | Stop reason: HOSPADM

## 2018-05-18 RX ORDER — POTASSIUM CHLORIDE 1.5 G/1.58G
20-40 POWDER, FOR SOLUTION ORAL
Status: DISCONTINUED | OUTPATIENT
Start: 2018-05-18 | End: 2018-05-22 | Stop reason: HOSPADM

## 2018-05-18 RX ORDER — TAMSULOSIN HYDROCHLORIDE 0.4 MG/1
0.4 CAPSULE ORAL DAILY
Status: DISCONTINUED | OUTPATIENT
Start: 2018-05-18 | End: 2018-05-22 | Stop reason: HOSPADM

## 2018-05-18 RX ORDER — ONDANSETRON 2 MG/ML
4 INJECTION INTRAMUSCULAR; INTRAVENOUS EVERY 6 HOURS PRN
Status: DISCONTINUED | OUTPATIENT
Start: 2018-05-18 | End: 2018-05-22 | Stop reason: HOSPADM

## 2018-05-18 RX ORDER — POTASSIUM CL/LIDO/0.9 % NACL 10MEQ/0.1L
10 INTRAVENOUS SOLUTION, PIGGYBACK (ML) INTRAVENOUS
Status: DISCONTINUED | OUTPATIENT
Start: 2018-05-18 | End: 2018-05-22 | Stop reason: HOSPADM

## 2018-05-18 RX ORDER — POTASSIUM CHLORIDE 1500 MG/1
20-40 TABLET, EXTENDED RELEASE ORAL
Status: DISCONTINUED | OUTPATIENT
Start: 2018-05-18 | End: 2018-05-22 | Stop reason: HOSPADM

## 2018-05-18 RX ORDER — NALOXONE HYDROCHLORIDE 0.4 MG/ML
.1-.4 INJECTION, SOLUTION INTRAMUSCULAR; INTRAVENOUS; SUBCUTANEOUS
Status: DISCONTINUED | OUTPATIENT
Start: 2018-05-18 | End: 2018-05-22 | Stop reason: HOSPADM

## 2018-05-18 RX ORDER — WARFARIN SODIUM 4 MG/1
4 TABLET ORAL
Status: COMPLETED | OUTPATIENT
Start: 2018-05-18 | End: 2018-05-18

## 2018-05-18 RX ORDER — SODIUM CHLORIDE 9 MG/ML
INJECTION, SOLUTION INTRAVENOUS CONTINUOUS
Status: DISCONTINUED | OUTPATIENT
Start: 2018-05-18 | End: 2018-05-19 | Stop reason: CLARIF

## 2018-05-18 RX ORDER — PIPERACILLIN SODIUM, TAZOBACTAM SODIUM 3; .375 G/15ML; G/15ML
3.38 INJECTION, POWDER, LYOPHILIZED, FOR SOLUTION INTRAVENOUS EVERY 6 HOURS
Status: DISCONTINUED | OUTPATIENT
Start: 2018-05-18 | End: 2018-05-20

## 2018-05-18 RX ORDER — LIDOCAINE 40 MG/G
CREAM TOPICAL
Status: DISCONTINUED | OUTPATIENT
Start: 2018-05-18 | End: 2018-05-20

## 2018-05-18 RX ORDER — LIDOCAINE 40 MG/G
CREAM TOPICAL
Status: DISCONTINUED | OUTPATIENT
Start: 2018-05-18 | End: 2018-05-18

## 2018-05-18 RX ORDER — METOPROLOL TARTRATE 25 MG/1
25 TABLET, FILM COATED ORAL 2 TIMES DAILY
Status: DISCONTINUED | OUTPATIENT
Start: 2018-05-18 | End: 2018-05-22 | Stop reason: HOSPADM

## 2018-05-18 RX ORDER — POTASSIUM CHLORIDE 7.45 MG/ML
10 INJECTION INTRAVENOUS
Status: DISCONTINUED | OUTPATIENT
Start: 2018-05-18 | End: 2018-05-22 | Stop reason: HOSPADM

## 2018-05-18 RX ORDER — NITROGLYCERIN 0.4 MG/1
0.4 TABLET SUBLINGUAL EVERY 5 MIN PRN
Status: DISCONTINUED | OUTPATIENT
Start: 2018-05-18 | End: 2018-05-20

## 2018-05-18 RX ORDER — WARFARIN SODIUM 5 MG/1
5 TABLET ORAL
Status: DISCONTINUED | OUTPATIENT
Start: 2018-05-18 | End: 2018-05-18

## 2018-05-18 RX ORDER — BISACODYL 10 MG
10 SUPPOSITORY, RECTAL RECTAL DAILY PRN
Status: DISCONTINUED | OUTPATIENT
Start: 2018-05-18 | End: 2018-05-22 | Stop reason: HOSPADM

## 2018-05-18 RX ORDER — ONDANSETRON 4 MG/1
4 TABLET, ORALLY DISINTEGRATING ORAL EVERY 6 HOURS PRN
Status: DISCONTINUED | OUTPATIENT
Start: 2018-05-18 | End: 2018-05-22 | Stop reason: HOSPADM

## 2018-05-18 RX ORDER — POLYETHYLENE GLYCOL 3350 17 G/17G
17 POWDER, FOR SOLUTION ORAL DAILY PRN
Status: DISCONTINUED | OUTPATIENT
Start: 2018-05-18 | End: 2018-05-22 | Stop reason: HOSPADM

## 2018-05-18 RX ORDER — ACETAMINOPHEN 325 MG/1
650 TABLET ORAL EVERY 4 HOURS PRN
Status: DISCONTINUED | OUTPATIENT
Start: 2018-05-18 | End: 2018-05-22 | Stop reason: HOSPADM

## 2018-05-18 RX ADMIN — TAMSULOSIN HYDROCHLORIDE 0.4 MG: 0.4 CAPSULE ORAL at 09:25

## 2018-05-18 RX ADMIN — SODIUM CHLORIDE: 9 INJECTION, SOLUTION INTRAVENOUS at 22:20

## 2018-05-18 RX ADMIN — METOPROLOL TARTRATE 25 MG: 25 TABLET ORAL at 20:24

## 2018-05-18 RX ADMIN — VANCOMYCIN HYDROCHLORIDE 2000 MG: 5 INJECTION, POWDER, LYOPHILIZED, FOR SOLUTION INTRAVENOUS at 12:25

## 2018-05-18 RX ADMIN — PIPERACILLIN SODIUM,TAZOBACTAM SODIUM 3.38 G: 3; .375 INJECTION, POWDER, FOR SOLUTION INTRAVENOUS at 09:25

## 2018-05-18 RX ADMIN — PIPERACILLIN SODIUM,TAZOBACTAM SODIUM 3.38 G: 3; .375 INJECTION, POWDER, FOR SOLUTION INTRAVENOUS at 15:24

## 2018-05-18 RX ADMIN — PIPERACILLIN SODIUM,TAZOBACTAM SODIUM 3.38 G: 3; .375 INJECTION, POWDER, FOR SOLUTION INTRAVENOUS at 04:03

## 2018-05-18 RX ADMIN — WARFARIN SODIUM 4 MG: 4 TABLET ORAL at 19:16

## 2018-05-18 RX ADMIN — SODIUM CHLORIDE: 9 INJECTION, SOLUTION INTRAVENOUS at 02:07

## 2018-05-18 RX ADMIN — PIPERACILLIN SODIUM,TAZOBACTAM SODIUM 3.38 G: 3; .375 INJECTION, POWDER, FOR SOLUTION INTRAVENOUS at 22:18

## 2018-05-18 RX ADMIN — MESALAMINE 800 MG: 800 TABLET, DELAYED RELEASE ORAL at 09:25

## 2018-05-18 RX ADMIN — HUMAN ALBUMIN MICROSPHERES AND PERFLUTREN 3 ML: 10; .22 INJECTION, SOLUTION INTRAVENOUS at 14:30

## 2018-05-18 RX ADMIN — ACETAMINOPHEN 650 MG: 325 TABLET ORAL at 09:37

## 2018-05-18 RX ADMIN — MESALAMINE 800 MG: 800 TABLET, DELAYED RELEASE ORAL at 20:25

## 2018-05-18 RX ADMIN — METOPROLOL TARTRATE 25 MG: 25 TABLET ORAL at 09:25

## 2018-05-18 NOTE — ED NOTES
Patient in Afib with frequent PVC's.  Patient remains alert with no chest discomfort or shortness of breath.

## 2018-05-18 NOTE — ED NOTES
Patient report received from Paige. Patient asleep on side.  Patient woke to verbal stimuli, reconnected to monitor and vitals assessed.

## 2018-05-18 NOTE — CONSULTS
[facility]    Infectious Disease Consultation     Date of Admission:  5/17/2018  Date of Consult (When I saw the patient): 05/18/18    Assessment & Plan   Fausto Farr is a 78 year old male who was admitted on 5/17/2018.     Impression:  1. 78 male with history of Aortic valve and mitral valve replacement.   2. Admitted with sudden onset fevers and chills.   3. Suspicion for endocarditis given  cardiac history .   4. Blood cultures done and pending at the outside ER.   5. On broad spectrum antibiotics.   6. New since last night also runny nose and sore throat.     Recommendations:   Agree with broad spectrum antibiotics.   Follow up on the pending blood cultures.   Checking for influenza given sudden onset URI symptoms with high fever.       Cassandra Floyd MD    Reason for Consult   Reason for consult: I was asked by Dr. Morris  to evaluate this patient for sepsis.    Primary Care Physician   Tu Reyes MD    Chief Complaint   Fevers and chills after an ear biopsy.     History is obtained from the patient and medical records    History of Present Illness   Fausto Farr is a 78 year old male who is coming with complaints of fevers and chills that started abruptly the evening of admission.  He was seen first at an  and then sent to the ER. Started on broad spectrum antibiotics. Blood cultures done at  before being sent here.     Past Medical History   I have reviewed this patient's medical history and updated it with pertinent information if needed.   Past Medical History:   Diagnosis Date     Abdominal pain      Abnormal ECG     RBBB, 1st degree AVB, Left axis deviation     Anemia     currently taking iron     Arrhythmia     pac, pvc     Back pain     since 1980     Bruit      CAD (coronary artery disease)      Cellulitis 10/18/12     Contact dermatitis and other eczema, due to unspecified cause      Diaphragmatic hernia without mention of obstruction or gangrene      Gastric ulcer       Glucose intolerance (impaired glucose tolerance)      Heart murmur 9/16/13    valvular heart disease     Hyperlipidaemia      Hypertension 8/6/13     Lumbago      Malaise and fatigue      Mobitz (type) I (Wenckebach's) atrioventricular block     and RBBB     Nocturia 10/18/12     Nocturia      Nonallopathic lesion of cervical region      Nonallopathic lesion of lumbar region      Nonallopathic lesion of pelvic region, not elsewhere classified      Nonallopathic lesion of rib cage      Nonallopathic lesion of sacral region      Paroxysmal atrial fibrillation (H) 10/18/12     Prostate cancer (H) 2008    radiation seed, XRT      PVD (peripheral vascular disease) (H)      Rotator cuff strain     and sprain     S/P aortic valve replacement 2006    developed perivalve leak and MS, therefore redo surg 2013     S/P CABG (coronary artery bypass graft) 2006    Lima-Lad, RA-Rca     Sciatica of left side     since 2000     Sleep apnea     pt declined cpap     Ulcerative colitis (H)      Vitamin D deficiency        Past Surgical History   I have reviewed this patient's surgical history and updated it with pertinent information if needed.  Past Surgical History:   Procedure Laterality Date     AORTIC VALVE REPLACEMENT  1/3/06    redo AVR SJM 21mm and SJM MVR 27mm in 2013SJM 21(AGFN [id number]):AVR, SJM 27 :MVR-     ARTHROPLASTY KNEE      right knee     BACK SURGERY  Oct 2015    Fusion L4-5, laminectomy L2, L3     BYPASS GRAFT ARTERY CORONARY  10/2013    reimplantation of radial artery graft to RCA     C CABG, VEIN, TWO  1/3/06    Left radial to RCA, LIMA to LAD (RA to RCA reimplanted at time of 2013 surg)     CARDIAC CATHERIZATION  11/2005    Stent placed to RCA     CARDIAC CATHERIZATION  04/2013    Occluded RCA, patent LIMA to LAD and radial graft to PDA     CARPAL TUNNEL RELEASE RT/LT  1994     COLONOSCOPY  8-22-11     CYSTOSCOPY FLEXIBLE  10/16/2013    Procedure: CYSTOSCOPY FLEXIBLE;  FLEXIBLE CYSTOSCOPY / DILATION OF  URETHRA / INSERTION OF LESLIE;  Surgeon: Cooper Wallace MD;  Location: [facility] OR     ENDOVASCULAR REPAIR ANEURYSM ABDOMINAL AORTA       ENDOVASCULAR REPAIR, INFRARENAL ABDOMINAL AORTIC ANEURYSM/DISSECTION; MODULAR BIFURCATED PROSTHESIS      AAA repair endovascular     ENT SURGERY       GENITOURINARY SURGERY  08    radioactive seeding     HEAD & NECK SURGERY      vocal cord polypectomy     KNEE SURGERY   Right knee arthroscopy     OPTICAL TRACKING SYSTEM FUSION SPINE POSTERIOR LUMBAR THREE+ LEVELS N/A 10/29/2015    Procedure: OPTICAL TRACKING SYSTEM FUSION SPINE POSTERIOR LUMBAR THREE+ LEVELS;  Surgeon: Walt Garcia MD;  Location: [facility] OR     PROSTATE SURGERY  2008 Radioactive seeding     PROSTATE SURGERY      radioactive seeding 08     REPAIR ANEURYSM ABDOMINAL AORTA       REPAIR VALVE MITRAL  10/16/2013    SJM 21(AGFN [id number]):AVR, SJM 27  501:MVRProcedure: REPAIR VALVE MITRAL;  REDO STERNOTOMY/REDO AORTIC VALVE REPLACEMENT/ MITRAL VALVE REPLACEMENT/REIMPLANTATION OF RIGHT CORONARY ARTERY BYPASS WITH RACHAEL ( ON PUMP);  Surgeon: Viet Singh MD;  Location:  OR     REPLACE VALVE AORTIC  10/16/2013    Procedure: REPLACE VALVE AORTIC;;  Surgeon: Viet Singh MD;  Location:  OR     SURGERY GENERAL IP CONSULT  2008 Excision aneurysm abdominal aorta     SURGERY GENERAL IP CONSULT   Vocal cord polypectomy     VASCULAR SURGERY  1993     varicose vein stripping       Prior to Admission Medications   Prior to Admission Medications   Prescriptions Last Dose Informant Patient Reported? Taking?   ASPIRIN NOT PRESCRIBED (INTENTIONAL)   Yes No   Sig: Please choose reason not prescribed, below   betamethasone valerate (VALISONE) 0.1 % lotion   No Yes   Sig: APPLY TOPICALLY TWICE DAILY PRN   ezetimibe (ZETIA) 10 MG tablet 2018 at pm  No Yes   Sig: Take 1 tablet (10 mg) by mouth daily   fluocinonide (LIDEX) 0.05 % ointment   No Yes   Si- 30 gram tubes.   Apply twice a day as needed.   furosemide (LASIX) 40 MG tablet 5/16/2018 at pm  No Yes   Sig: Take 0.5 tablets (20 mg) by mouth daily   mesalamine (ASACOL HD) 800 MG EC tablet 5/17/2018 at am  No Yes   Sig: Take 1 tablet (800 mg) by mouth 2 times daily   metoprolol tartrate (LOPRESSOR) 25 MG tablet 5/17/2018 at am  No Yes   Sig: Take 1 tablet (25 mg) by mouth 2 times daily   rosuvastatin (CRESTOR) 40 MG tablet 5/16/2018 at pm  No Yes   Sig: Take 1 tablet (40 mg) by mouth daily   tamsulosin (FLOMAX) 0.4 MG capsule 5/16/2018 at pm  No Yes   Sig: Take 1 capsule (0.4 mg) by mouth daily   warfarin (COUMADIN) 5 MG tablet 5/16/2018 at pm  No Yes   Sig: Take 1 1/2 tablets (7.5 mg) by mouth TWTFSS; 1 tablet (5 mg) on Mondays OR as directed by INR Clinic      Facility-Administered Medications: None     Allergies   Allergies   Allergen Reactions     Bees Anaphylaxis       Immunization History   Immunization History   Administered Date(s) Administered     Influenza (H1N1) 12/17/2009     Influenza (High Dose) 3 valent vaccine 09/16/2013, 10/13/2014, 10/05/2015, 09/30/2016, 09/07/2017     Influenza (IIV3) PF 10/13/2011, 10/23/2012     Pneumo Conj 13-V (2010&after) 10/05/2015     Pneumococcal 23 valent 08/10/2013     TD (ADULT, 7+) 05/07/2003     TDAP Vaccine (Adacel) 09/07/2017     Tdap (Adacel,Boostrix) 06/09/2013     Zoster vaccine, live 12/23/2008       Social History   I have reviewed this patient's social history and updated it with pertinent information if needed. Fausto Farr  reports that he quit smoking about 15 years ago. He has a 40.00 pack-year smoking history. He has never used smokeless tobacco. He reports that he drinks alcohol. He reports that he does not use illicit drugs.    Family History   I have reviewed this patient's family history and updated it with pertinent information if needed.   Family History   Problem Relation Age of Onset     Coronary Artery Disease Father      CABG     HEART DISEASE Father       Pacemaker     Other Cancer Daughter      HEART DISEASE Brother      Other - See Comments Grandchild        Review of Systems   The 10 point Review of Systems is negative other than noted in the HPI or here.     Physical Exam   Temp: 97.6  F (36.4  C) Temp src: Oral BP: (!) 89/75 Pulse: 105 Heart Rate: 65 Resp: 28 SpO2: 94 % O2 Device: None (Room air) Oxygen Delivery: 2 LPM  Vital Signs with Ranges  Temp:  [97.6  F (36.4  C)-101.5  F (38.6  C)] 97.6  F (36.4  C)  Pulse:  [] 105  Heart Rate:  [] 65  Resp:  [0-40] 28  BP: ()/() 89/75  SpO2:  [92 %-100 %] 94 %  228 lbs 0 oz  Body mass index is 37.36 kg/(m^2).    GENERAL APPEARANCE:  Runny nose   EYES: Eyes grossly normal to inspection, PERRL and conjunctivae and sclerae normal  HENT: ear canals and TM's normal and nose and mouth without ulcers or lesions  NECK: no adenopathy, no asymmetry, masses, or scars and thyroid normal to palpation  RESP: lungs clear to auscultation - no rales, rhonchi or wheezes  CV: regular rates and rhythm, normal S1 S2, no S3 or S4 and no murmur, click or rub  LYMPHATICS: normal ant/post cervical and supraclavicular nodes  ABDOMEN: soft, nontender, without hepatosplenomegaly or masses and bowel sounds normal  MS: extremities normal- no gross deformities noted  SKIN: no suspicious lesions or rashes      Data   Lab Results   Component Value Date    WBC 22.1 (H) 05/18/2018    HGB 11.8 (L) 05/18/2018    HCT 35.6 (L) 05/18/2018     05/18/2018     05/18/2018    POTASSIUM 4.4 05/18/2018    CHLORIDE 109 05/18/2018    CO2 24 05/18/2018    BUN 19 05/18/2018    CR 1.13 05/18/2018     (H) 05/18/2018    TROPONIN 2.73 (HH) 11/22/2005    TROPI 0.029 05/18/2018    AST 29 05/08/2018    ALT 32 05/08/2018    ALKPHOS 43 05/08/2018    BILITOTAL 0.5 05/08/2018    INR 3.71 (H) 05/18/2018       Recent Labs  Lab 05/18/18  0200   CULT No growth after 4 hours     Recent Labs   Lab Test  05/18/18 0200 11/28/13   1576   11/28/13   1650  11/28/13   1630  10/16/13   0557   CULT  No growth after 4 hours  Duplicate request Charge credited  Duplicate request Charge credited  No growth  No growth  No MRSA isolated: susceptibilities not available. PCR assay is more sensitive  than culture.

## 2018-05-18 NOTE — PLAN OF CARE
Problem: Sepsis/Septic Shock (Adult)  Goal: Signs and Symptoms of Listed Potential Problems Will be Absent, Minimized or Managed (Sepsis/Septic Shock)  Signs and symptoms of listed potential problems will be absent, minimized or managed by discharge/transition of care (reference Sepsis/Septic Shock (Adult) CPG).   Outcome: Improving  Patient slept well during night.  BP's better and not as soft.  Temperature remains low grade at 100.  IV fluids infusing.  Lactic acids trending to normal.  Troponin levels negative.  Patient feeling better and stronger.  Ambulated to  with SBA.  Left ear lobe biopsy site oozing minimal blood.

## 2018-05-18 NOTE — PROGRESS NOTES
[facility]    Hospitalist Progress Note    Assessment & Plan   Fausto Farr is a 78 year old male who was admitted on 5/17/2018,  With  past medical history of AAA, coronary artery disease status post CABG, status post aortic and mitral mechanical valve replacement, ulcerative colitis, atrial fibrillation, and prostate cancer .he  presents to the Emergency Department with sepsis of unclear etiology.   Sepsis of unclear etiology:    -Zosyn and  vancomycin.   -follow up on blood and urine cultures.   - possible endocarditis is a differential as he has 2 mechanical valves  -TTE pending .  -consult Infectious Disease .  - Will continue with normal saline 125 an hour and follow serial lactic acids.     Coronary artery disease, status post CABG:   - Patient without chest pain, EKG no new changes , troponin x2  negative.    -continue medications    Hypertension:   -metoprolol with parameters, blood pressure low   history of congestive heart failure:   - Last echo with intact EF.    -lasix held   hyperlipidemia  -Hold prior to admit rosuvastatin.    Benign prostatic hypertrophy:  -  Continue on prior to admit tamsulosin as he tolerates.     Ulcerative colitis:    -Does not appear to be in exacerbation.  Continue on prior to admit mesalamine.     Mechanical valve, aortic and mitral:  -  Will continue on prior to admit Coumadin with pharmacy to dose.   -international normalized ratio  therapeutic      Deep venous thrombosis prophylaxis:  Patient is already on Coumadin.   Gastrointestinal prophylaxis:  Advance diet.   Code Status: Full Code     Disposition: Expected discharge once blood pressure improves 2 days     Kaelyn Cutler MD  Text Page   (7am to 6pm)    Interval History   Admitted early today , complaints of  Dizziness and not feeling well, no nausea, complaints of  Right shoulder pain off and on ,recently increased yard work etc.    -Data reviewed today: I reviewed all new labs and imaging  results over the last 24 hours. Reviewed admission chest x ray      Physical Exam   Temp: 101.5  F (38.6  C) Temp src: Oral BP: 98/59 Pulse: 105 Heart Rate: 61 Resp: 27 SpO2: 94 % O2 Device: None (Room air) Oxygen Delivery: 2 LPM  Vitals:    05/17/18 2213 05/18/18 0115 05/18/18 0600   Weight: 102.5 kg (226 lb) 105.2 kg (232 lb) 103.4 kg (228 lb)     Vital Signs with Ranges  Temp:  [98.8  F (37.1  C)-101.5  F (38.6  C)] 101.5  F (38.6  C)  Pulse:  [] 105  Heart Rate:  [] 61  Resp:  [0-40] 27  BP: ()/() 98/59  SpO2:  [92 %-100 %] 94 %  I/O last 3 completed shifts:  In: 652 [P.O.:100; I.V.:552]  Out: 500 [Urine:500]    Constitutional: Awake, alert, cooperative, no apparent distress,obese   Respiratory: Clear to auscultation bilaterally, no crackles or wheezing  Cardiovascular: Regular rate and rhythm, normal S1 and S2, and no murmur noted  GI: Normal bowel sounds, soft, non-distended, non-tender  Skin/Integumen: No rashes, no cyanosis, no edema  Neuro : moving all 4 extremities, no focal deficit noted     Medications     - MEDICATION INSTRUCTIONS -       sodium chloride 125 mL/hr at 05/18/18 0925     Warfarin Therapy Reminder         mesalamine  800 mg Oral BID     metoprolol tartrate  25 mg Oral BID     piperacillin-tazobactam  3.375 g Intravenous Q6H     sodium chloride (PF)  3 mL Intracatheter Q8H     tamsulosin  0.4 mg Oral Daily     vancomycin (VANCOCIN) IV  2,000 mg Intravenous Q12H     warfarin  4 mg Oral ONCE at 18:00       Data     Recent Labs  Lab 05/18/18  0550 05/18/18  0200   WBC 22.1*  --    HGB 11.8*  --    MCV 90  --      --    INR 3.71*  --      --    POTASSIUM 4.4  --    CHLORIDE 109  --    CO2 24  --    BUN 19  --    CR 1.13  --    ANIONGAP 8  --    AWILDA 7.8*  --    *  --    TROPI 0.029 0.042       Recent Labs  Lab 05/18/18  0550   *       Imaging:   Recent Results (from the past 24 hour(s))   Chest XR,  PA & LAT    Narrative    CHEST TWO VIEWS     5/17/2018 10:48 PM     HISTORY: Fever, shortness of breath.      COMPARISON: 9/14/2015 chest x-ray.      Impression    IMPRESSION: Previous valve surgery. Heart size is normal. Pulmonary  vasculature is normal. Small band of atelectasis in the right midlung  zone. No pleural fluid.    DIPAK KUHN MD

## 2018-05-18 NOTE — PROGRESS NOTES
Group B strep in the Blood cultures noted from outside UC , stop vancomycin given the creatinine.   Continue zosyn, follow up on the JYOTSNA and can be narrowed further.  Daily blood cultures no longterm IV Lines yet.     Cassandra Floyd MD

## 2018-05-18 NOTE — ED NOTES
Bed: ED05  Expected date:   Expected time:   Means of arrival:   Comments:  [facility] 78M Sepsis

## 2018-05-18 NOTE — H&P
Admitted:     05/17/2018      DATE OF ADMISSION: 05/18/2018      PRIMARY CARE PHYSICIAN: Tu Reyes MD      PRIMARY CARDIOLOGIST:  Calderon Santo MD      CHIEF COMPLAINT:  Generalized weakness.      HISTORY OF PRESENT ILLNESS:  The patient is a 78-year-old male with a complicated medical history including abdominal aortic aneurysm, atrial fibrillation, coronary artery disease status post CABG, hypertension, hyperlipidemia, status post mechanical aortic and mitral valve replacement, ulcerative colitis and prostate cancer who presents to the Emergency Department from urgent care with concern of sepsis of unclear etiology.  The patient reports being in an otherwise normal state of health until this evening.  After dinner he had sudden onset of fevers and chills.  Along with this, he had progressive generalized weakness.  No specific asymmetry to his neurologic exam.  He went to urgent care for evaluation.  There he was noted to have soft pressures with systolics in the 90s and an elevated white count, so 2 blood cultures were obtained and he was sent to the Emergency Department for further evaluation and care.        In the Emergency Department, his urinalysis returned negative.  Repeat chest x-ray with a lateral was negative for infiltrate.  He denies cough or congestion.  No diarrhea.  No bloody ___.  No abdominal pain.  No sore joints.  He had a small associated headache with fevers and chills, but no significant neck stiffness.  He was given Zosyn at the outside urgent care and was followed up with vancomycin here.  He has continued with soft pressures.  He is on his third liter of normal saline.  He is coming in for further evaluation and care.      PAST MEDICAL HISTORY:   1.  Abdominal aortic aneurysm followed yearly with ultrasound.   2.  Coronary artery disease, status post CABG.   3.  Chronic back pain.   4.  Diaphragmatic hernia.   5.  Hypertension.   6.  Hyperlipidemia.   7.  Mitral regurgitation, status  post mechanical mitral valve replacement and mechanical aortic valve replacement.   8.  Prostate cancer.   9.  History of gastric ulcer.   10.  Ulcerative colitis, well controlled on mesalamine.   11.  Urinary retention.      PAST SURGICAL HISTORY:   1.  Abdominal aortic aneurysm repair.   2.  Double bypass in 2006.   3.  Mechanical aortic valve replacement in 2006.   4.  Mechanical mitral valve replacement.    5.  Carpal tunnel release.      PRIOR TO ADMIT MEDICATIONS:   1.  Betamethasone lotion p.r.n.   2.  Zetia 10 mg daily.   3.  Lasix 20 mg daily.   4.  Mesalamine 800 mg b.i.d.   5.  Metoprolol tartrate 25 mg b.i.d.   6.  Rosuvastatin 40 mg daily.   7.  Tamsulosin 0.4 mg daily.   8.  Warfarin dosed for INR of 2.5-3.5.      FAMILY HISTORY:  Coronary artery disease.      SOCIAL HISTORY:  The patient is a former smoker.  Otherwise lives independently.  Wife is at the bedside, whose name is Ritu.      REVIEW OF SYSTEMS:  Ten-point review of systems was obtained with pertinent positives and negatives as per HPI, otherwise negative.      PHYSICAL EXAMINATION:   VITAL SIGNS:  Blood pressure 116/85, temperature 98.8, heart rate 87, sats 95% on 3 liters of oxygen.   GENERAL:  Alert and cooperative.   HEENT:  Pupils equal, reactive to light.  No scleral icterus.   NECK:  Supple.   CARDIOVASCULAR:  Irregularly irregular.  Positive murmur, nonpathologic.   PULMONARY: Crackles faintly at the bases, otherwise generally clear and nonlabored.   ABDOMEN:  Obese, soft, nontender.   MUSCULOSKELETAL:  No lower extremity edema.   SKIN:  Warm and dry, no obvious rashes.   NEUROLOGIC:  Alert and oriented, no gross motor defects.   PSYCHIATRIC:  Appropriate affect.      DIAGNOSTIC DATA: EKG: Atrial fibrillation with 2 types of conduction, with broader QRS and one with a shorter QRS with___ clear underlying aberrant conduction, and right bundle branch block appearing grossly normal to prior EKG.       Chest x-ray is negative for acute  infiltrate, a little bit of atelectasis. No pleural fluid.      LABORATORY DATA:  Urinalysis is yellow, clear and no white blood cells.  Lactic acid here is 2.1, sodium is 143, potassium 4.1, creatinine is 1.2.  INR is 3.8.  Troponin is negative.  White count is 16.2 with a left shift, hemoglobin 14.8, platelets are 232,000.      ASSESSMENT AND PLAN:  The patient is a 78-year-old male with a past medical history of AAA, coronary artery disease status post CABG, status post aortic and mitral mechanical valve replacement, ulcerative colitis, atrial fibrillation, and prostate cancer who presents to the Emergency Department with sepsis of unclear etiology.   1.  Sepsis of unclear etiology:  Patient has leukocytosis with a left shift.  Soft pressures.  On third liter of fluids.  Lactic acid still slightly elevated.  Chest x-ray clear of infiltrates.  No associated upper respiratory signs or symptoms.  Urinalysis is negative.  No indication of meningitis or septic joint.  Two blood cultures have been obtained from the urgent care. He has been given a dose of Zosyn and most recently vancomycin.  Will follow up on blood and urine cultures.  We will obtain one more blood culture.  Given consideration of possible endocarditis, will obtain a TTE and consult Infectious Disease formally.  So far he has not needed pressor support.  Will continue with normal saline 125 an hour and follow serial lactic acids.  Will obtain a procalcitonin.  Continue with vancomycin and Zosyn.  Appreciate insight from Infectious Disease.   2.  Coronary artery disease, status post CABG:  Patient without chest pain.  EKG generally unchanged from prior.  Initial troponin negative.  We will obtain another troponin.  He reports that this had some similarity to prior ACS, but with these other findings, this seems a much lower suspicion and again, will check another troponin to rule this out.   3.  Hypertension:  Will try to continue on prior to admit  metoprolol, but with soft blood pressures, will not give unless his blood pressure has stabilized.  He otherwise does not appear to be on any antihypertensives.   4.  Reported history of congestive heart failure:  Last echo with intact EF.  The patient is on prior to admit Lasix, which will be held as we are giving fluids.   5.  Hyperlipidemia:  Hold prior to admit rosuvastatin.   6.  Benign prostatic hypertrophy:  Continue on prior to admit tamsulosin as he tolerates.   7.  Ulcerative colitis:  Does not appear to be in exacerbation.  Continue on prior to admit mesalamine.   8.  Mechanical valve, aortic and mitral:  Will continue on prior to admit Coumadin with pharmacy to dose.   9.  Fluids, electrolytes and nutrition:  Electrolytes otherwise appear stable.  We will place on normal saline at 125 an hour.   10.  Deep venous thrombosis prophylaxis:  Patient is already on Coumadin.   11.  Gastrointestinal prophylaxis:  Advance diet.      DISPOSITION:  Inpatient.      CODE STATUS:  FULL, AND THIS WAS DISCUSSED.         MARY BETH LANIER MD             D: 2018   T: 2018   MT:       Name:     LIANG VAUGHN   MRN:      [id number]        Account:      [id number]   :      1940        Admitted:     2018                   Document: [id number]

## 2018-05-18 NOTE — PHARMACY-ADMISSION MEDICATION HISTORY
Admission medication history interview status for the 5/17/2018  admission is complete. See EPIC admission navigator for prior to admission medications     Medication history source reliability:Good    Actions taken by pharmacist (provider contacted, etc):None     Additional medication history information not noted on PTA med list :None    Medication reconciliation/reorder completed by provider prior to medication history? No    Time spent in this activity: 15min    Prior to Admission medications    Medication Sig Last Dose Taking? Auth Provider   betamethasone valerate (VALISONE) 0.1 % lotion APPLY TOPICALLY TWICE DAILY PRN  Yes Tu Reyes MD   ezetimibe (ZETIA) 10 MG tablet Take 1 tablet (10 mg) by mouth daily 5/16/2018 at pm Yes Tu Reyes MD   fluocinonide (LIDEX) 0.05 % ointment 2- 30 gram tubes.  Apply twice a day as needed.  Yes Tu Reyes MD   furosemide (LASIX) 40 MG tablet Take 0.5 tablets (20 mg) by mouth daily 5/16/2018 at pm Yes Tu Reyes MD   mesalamine (ASACOL HD) 800 MG EC tablet Take 1 tablet (800 mg) by mouth 2 times daily 5/17/2018 at am Yes Tu Reyes MD   metoprolol tartrate (LOPRESSOR) 25 MG tablet Take 1 tablet (25 mg) by mouth 2 times daily 5/17/2018 at am Yes Tu Reyes MD   rosuvastatin (CRESTOR) 40 MG tablet Take 1 tablet (40 mg) by mouth daily 5/16/2018 at pm Yes Tu Reyes MD   tamsulosin (FLOMAX) 0.4 MG capsule Take 1 capsule (0.4 mg) by mouth daily 5/16/2018 at pm Yes Tu Reyes MD   warfarin (COUMADIN) 5 MG tablet Take 1 1/2 tablets (7.5 mg) by mouth TWTFSS; 1 tablet (5 mg) on Mondays OR as directed by INR Clinic 5/16/2018 at pm Yes Tu Reyes MD   ASPIRIN NOT PRESCRIBED (INTENTIONAL) Please choose reason not prescribed, below   Tu Reyes MD

## 2018-05-18 NOTE — PROVIDER NOTIFICATION
Notified Person:  Hospitalist  Notified Persons Name: Dr. Cutler  Notification Date/Time:  5/18/18; 1630  Notification Interaction:  Paged with call back  Purpose of Notification:  + blood culture results  Orders Received: repeat blood cultures in AM  Comments:  Updated MD that urgency room faxed over lab results with abnormal blood cultures x2: gram positive cocci in chains and streptococcus group B.

## 2018-05-18 NOTE — ED PROVIDER NOTES
History     Chief Complaint:  Weakness     HPI   Fausto Farr is a 78 year old male who presents to the emergency department today via EMS for evaluation of sepsis. The patient was seen at urgent care earlier today for evaluation of weakness and shortness of breath. The patient was then diagnosed with sepsis and EMS was called who brought him to the emergency department for evaluation. There he also became hypoxic to the low 80's. The patient was given one liter of fluids prior to arrival. Here the patient states that he began to feel ill this afternoon at home and was then found to be febrile. He also states for the last four weeks he has been having pain in his right arm only at night but denies joint pain. Patient denies any chest pain, abdominal pain, cough, urinary symptoms, neck pain, or headache.       5/17/2018 URGENT CARE WORKUP:  History of Present Illness   Fausto Farr is a 78 y.o. male with a complicated medical history on coumadin who presents to the UR for evaluation of chills. The patient states that earlier today he had a biopsy on his left ear. Following dinner this evening, he developed a fever of 101, chills, shakiness, lightheadedness, and generalized weakness. He also notes that his shortness of breath is worse than normal. He indicates that he has not had any symptoms like this over the past few days. The patient denies vision changes, speech difficulty, congestion, rhinorrhea, ear pain, sore throat, cough, dysuria, urinary frequency, chest pain, nausea, or abdominal pain.   UR Course   Laboratory:  Blood culture: pending  Blood culture: pending  Lactate: 2.3 (H)  BMP: cl-96 (L), ag-19 (H), gluc rand-129 (H), gfr not af-59 (L), o/w WNL, creat-1.20  Troponin: <0.01  CBC: WBC 16.2 (high), HGB 14.8 (WNL),  (WNL)  INR: 3.8 (H)  ECG:  Indication: dyspnea, help rule out cardiac etiology.  Time taken: 1853  Findings: Sequence of multiform premature ventricular complexes. Aberrantly  conducted complexes. Doublets of multiform premature ventricular complexes. Doublets of aberrantly conducted complexes. Atrial fibrillation and PVC's. Vent. Rate-at a controlled rate  Imaging:   XR Chest 1 View PA or AP: Mild cardiomegaly, interval cardiac valvular surgery. Pulmonary vessels are normal. Lung volumes are low but lungs are grossly clear. No  definite pleural effusion. Report per radiology.  Interventions:  All Medication Administration through 05/17/2018 2107   Date/Time Order Dose Route Action   05/17/2018 1950 NaCl 0.9% 1,000 mL 1,000 mL Intravenous New Bag   05/17/2018 2050 NaCl 0.9% 1,000 mL 0 mL Intravenous Stopped   05/17/2018 1950 piperacillin-tazobactam 3.375 g in D5W 100 mL (ZOSYN) 3.375 g Intravenous New Bag     Allergies:  Bees     Medications:    ASPIRIN NOT PRESCRIBED (INTENTIONAL)  betamethasone valerate (VALISONE) 0.1 % lotion  ezetimibe (ZETIA) 10 MG tablet  fluocinonide (LIDEX) 0.05 % ointment  furosemide (LASIX) 40 MG tablet  mesalamine (ASACOL HD) 800 MG EC tablet  metoprolol tartrate (LOPRESSOR) 25 MG tablet  rosuvastatin (CRESTOR) 40 MG tablet  tamsulosin (FLOMAX) 0.4 MG capsule  warfarin (COUMADIN) 5 MG tablet    Past Medical History:    AAA (abdominal aortic aneurysm) (HC)   Abdominal aneurysm without mention of rupture   Atrial fibrillation (HC)   CAD (coronary artery disease)   Chronic back pain   Diaphragmatic hernia without mention of obstruction or gangrene   Gastric ulcer, unspecified as acute or chronic, without mention of hemorrhage, perforation, or obstruction   Heart murmur   High cholesterol   HTN (hypertension)   Hyperlipemia   Hypertension   Lumbago   Mitral regurgitation   Nocturia   Nonallopathic lesion of lumbar region, not elsewhere classified   Nonallopathic lesion of pelvic region, not elsewhere classified   Nonallopathic lesion of sacral region, not elsewhere classified   Other specified anemias   Prostate cancer (HC)   Prostate cancer (HC)   Rotator cuff  (capsule) sprain and strain   S/P AVR (aortic valve replacement)   S/P MVR (mitral valve replacement)   Ulcerative colitis (HC) Vitamin D deficiency   Ulcerative colitis (HC)  Atrial fibrillation (HC)  Other specified anemias  Urinary retention  Diaphragmatic hernia without mention of obstruction or gangrene  Nonallopathic lesion of, not elsewhere classified (lumbar, sacral, cervical, & thoracic)  Nocturia  S/P CABG (coronary artery bypass graft)  Vitamin D deficiency  MRSA (methicillin resistant Staphylococcus aureus)  Prostate cancer (HC)  Glucose intolerance (impaired glucose tolerance)  Unstable angina (HC)  Glaucoma suspect  AAA (abdominal aortic aneurysm) (HC)   Abdominal aneurysm without mention of rupture   Atrial fibrillation (HC)   CAD (coronary artery disease)   Chronic back pain   Diaphragmatic hernia without mention of obstruction or gangrene   Gastric ulcer, unspecified as acute or chronic, without mention of hemorrhage, perforation, or obstruction   Heart murmur   High cholesterol   HTN (hypertension)   Hyperlipemia   Hypertension   Lumbago   Mitral regurgitation   Nocturia   Nonallopathic lesion of lumbar region, not elsewhere classified   Nonallopathic lesion of pelvic region, not elsewhere classified   Nonallopathic lesion of sacral region, not elsewhere classified   Other specified anemias   Prostate cancer (HC)   Prostate cancer (HC)   Rotator cuff (capsule) sprain and strain   S/P AVR (aortic valve replacement)   S/P MVR (mitral valve replacement)   Ulcerative colitis (HC) Vitamin D deficiency   Ulcerative colitis (HC)  Atrial fibrillation (HC)  Other specified anemias  Urinary retention  Diaphragmatic hernia without mention of obstruction or gangrene  Nonallopathic lesion of, not elsewhere classified (lumbar, sacral, cervical, & thoracic)  Nocturia  S/P CABG (coronary artery bypass graft)  Vitamin D deficiency  MRSA (methicillin resistant Staphylococcus aureus)  Prostate cancer (HC)  Glucose  "intolerance (impaired glucose tolerance)  Unstable angina (HC)  Glaucoma suspect    Past Surgical History:    Abdominal aortic aneurysm repair   Aortic valve replacement   Carpal tunnel release   Mitral valve replacement   Pr endoven rfa varicose veins     Family History:    CAD     Social History:  The patient was accompanied to the ED by EMS.  Smoking Status: Former smoker  Smokeless Tobacco: No  Alcohol Use: Yes    Marital Status:        Review of Systems   Constitutional: Positive for fatigue and fever.   Respiratory: Positive for shortness of breath.    Cardiovascular: Negative for chest pain.   Gastrointestinal: Negative for abdominal distention.   Genitourinary: Negative.    Neurological: Negative for headaches.   All other systems reviewed and are negative.    Physical Exam     Patient Vitals for the past 24 hrs:   BP Temp Temp src Pulse Heart Rate Resp SpO2 Height Weight   05/18/18 0400 - 99.2  F (37.3  C) Oral - - - - - -   05/18/18 0130 (!) 82/59 - - - 108 22 98 % - -   05/18/18 0115 (!) 83/62 100.1  F (37.8  C) Oral - 104 22 97 % 1.664 m (5' 5.5\") 105.2 kg (232 lb)   05/18/18 0100 114/56 - - - - - - - -   05/18/18 0045 (!) 86/54 - - - 100 (!) 34 97 % - -   05/18/18 0044 96/54 - - 105 - 24 96 % - -   05/18/18 0030 96/57 - - - 104 29 98 % - -   05/18/18 0025 94/56 - - - 103 27 98 % - -   05/18/18 0020 108/66 - - - 102 25 97 % - -   05/18/18 0015 (!) 89/55 - - - 101 25 98 % - -   05/18/18 0013 92/52 - - - 103 29 97 % - -   05/18/18 0010 (!) 83/48 - - - 115 24 98 % - -   05/18/18 0001 - - - - 95 (!) 33 97 % - -   05/18/18 0000 90/56 - - - 86 (!) 38 96 % - -   05/17/18 2354 95/60 - - - 98 21 97 % - -   05/17/18 2347 (!) 85/55 - - - 79 (!) 40 96 % - -   05/17/18 2340 (!) 87/51 - - - - - 95 % - -   05/17/18 2339 - - - - - - 96 % - -   05/17/18 2338 (!) 88/58 - - - - - - - -   05/17/18 2213 116/85 98.8  F (37.1  C) Oral 62 87 20 95 % 1.651 m (5' 5\") 102.5 kg (226 lb)      Physical Exam  General: Alert and " cooperative with exam. Patient in mild distress. Normal mentation.  Nontoxic appearance  Head:  Scalp is NC/AT. Bandage to left ear from biopsy site.   Eyes:  No scleral icterus, PERRL  ENT:  The external nose and ears are normal. The oropharynx is normal and without erythema; mucus membranes are moist. Uvula midline, no evidence of deep space infection.  Neck:  Normal range of motion without rigidity.  CV:  Irregular, heart sounds consistent with mechanical valve.     No pathologic murmur   Resp:  Faint crackles left lung base. Nasal canula in place.     Non-labored, no retractions or accessory muscle use  GI:  Abdomen is soft, no distension, no tenderness. No peritoneal signs. Obese.   MS:  No lower extremity edema   Skin:  Warm and dry, No rash or lesions noted.  Neuro: Oriented x 3. No gross motor deficits.    Emergency Department Course     ECG:  Indication: Weakness   Completed at 2253.  Read at 2255.   Atrial fibrillation with competing junctional pacemaker with PVC or aberrantly conducted complexes. Left axis deviation. Right bundle branch block. Inferior infarct, age undetermined.   Rate 84 bpm. NM interval *. QRS duration 136. QT/QTc 422/498. P-R-T axes * -61 25.     Imaging:  Radiology findings were communicated with the patient who voiced understanding of the findings.    XR Chest 2 Views:  IMPRESSION: Previous valve surgery. Heart size is normal. Pulmonary  vasculature is normal. Small band of atelectasis in the right midlung  zone. No pleural fluid.  Report per radiology     Laboratory:  Laboratory findings were communicated with the patient who voiced understanding of the findings.    Lactic Acid: 2.1 (H)     UA: Yellow and clear urine. Protein albumin 10, Mucous urine present, o/w WNL      Interventions:  2343 NS Bolus 1,000mL IV   2358 Vancocin 2,000mg IV      Emergency Department Course:  Nursing notes and vitals reviewed.  2212 I performed an exam of the patient as documented above.   The patient  was sent for a chest x-ray while in the emergency department, results above.    The patient provided a urine sample here in the emergency department. This was sent for laboratory testing, findings above.   IV was inserted and blood was drawn for laboratory testing, results above.   2350 Patient rechecked and updated.    0011 I spoke with Dr. Morris of the Hospitalist service regarding patient's presentation, findings, and plan of care.   0014 Patient rechecked and updated.    I discussed the treatment plan with the patient. They expressed understanding of this plan and consented to admission. I discussed the patient with Dr. Morris, who will admit the patient to a monitored bed for further evaluation and treatment. I personally reviewed the laboratory and imaging results with the Patient and answered all related questions prior to admission.  Impression & Plan      Medical Decision Making:  Fausto Farr is a 78 year old male who presents with a fever, shortness of breath, and diagnosis of severe sepsis; sent from urgent care after receiving Zosyn. The patient's medical history and records reviewed. The patient's lactic acid was noted to be elevated and he did have an elevated WBC at outside facility (see care everywhere). Repeat lactic acid shows improvement (2.1). Urinalysis without evidence of infection. No skin source of infection identified.  Denies joint pain.  No meningeal signs.  Chest x-ray shows no significant acute pathology. The patient was noted to be super therapeutic on his INR (3.8); PE unlikely. Does have history of mechanical valve; endocarditis is a possibility; blood cultures obtained at outside facility.  Source of infection is undetermined.  He was provided vancomycin as well as IV fluids in the ED. Patient's blood pressure noted to be soft in the mid 80s systolic though responsive to fluids with normal MAPs. EKG demonstrates known atrial fibrillation with frequent PVC's. The patient will be  admitted to [facility] with the hospitalist service for further evaluation and care.     Diagnosis:    ICD-10-CM    1. Severe sepsis (H) A41.9 Blood culture    R65.20 Lactic acid whole blood     Troponin I     Procalcitonin       Disposition:  Admitted to [facility] under the supervision of Dr. Arturo Blanca Disclosure:  I, Rosalino Elias, am serving as a scribe at 10:09 PM on 5/17/2018 to document services personally performed by Dr. Marcus Nam  based on my observations and the provider's statements to me.    5/17/2018    EMERGENCY DEPARTMENT       Roge Nam,   05/18/18 0419

## 2018-05-18 NOTE — PHARMACY-VANCOMYCIN DOSING SERVICE
Pharmacy Vancomycin Initial Note  Date of Service May 18, 2018  Patient's  1940  78 year old, male    Indication: Sepsis    Current estimated CrCl = Estimated Creatinine Clearance: 74.9 mL/min (based on Cr of 0.91).    Creatinine for last 3 days  No results found for requested labs within last 72 hours.    Recent Vancomycin Level(s) for last 3 days  No results found for requested labs within last 72 hours.      Vancomycin IV Administrations (past 72 hours)                   vancomycin (VANCOCIN) 2,000 mg in sodium chloride 0.9 % 500 mL intermittent infusion (mg) 2,000 mg New Bag 18 2358                Nephrotoxins and other renal medications (Future)    Start     Dose/Rate Route Frequency Ordered Stop    18 1200  vancomycin (VANCOCIN) 2,000 mg in sodium chloride 0.9 % 500 mL intermittent infusion      2,000 mg  over 2 Hours Intravenous EVERY 12 HOURS 18 0126      18 0130  piperacillin-tazobactam (ZOSYN) 3.375 g vial to attach to  mL bag     Comments:  Pharmacy can adjust dose based on renal function.    3.375 g  over 30 Minutes Intravenous EVERY 6 HOURS 18 0120      18 2312  vancomycin (VANCOCIN) 2,000 mg in sodium chloride 0.9 % 500 mL intermittent infusion      2,000 mg  over 2 Hours Intravenous ONCE 18 2311            Contrast Orders - past 72 hours     None                Plan:  1.  Start vancomycin  2000 mg IV q12h.   2.  Goal Trough Level: 15-20 mg/L   3.  Pharmacy will check trough levels as appropriate in 1-3 Days.    4. Serum creatinine levels will be ordered daily for the first week of therapy and at least twice weekly for subsequent weeks.    5. [place] method utilized to dose vancomycin therapy: Method 1    Magdiel Barragan

## 2018-05-19 ENCOUNTER — APPOINTMENT (OUTPATIENT)
Dept: ULTRASOUND IMAGING | Facility: CLINIC | Age: 78
DRG: 872 | End: 2018-05-19
Attending: INTERNAL MEDICINE
Payer: MEDICARE

## 2018-05-19 PROBLEM — A40.1 SEPSIS DUE TO GROUP B STREPTOCOCCUS (H): Status: ACTIVE | Noted: 2018-05-19

## 2018-05-19 LAB
CREAT SERPL-MCNC: 0.98 MG/DL (ref 0.66–1.25)
GFR SERPL CREATININE-BSD FRML MDRD: 74 ML/MIN/1.7M2
GLUCOSE BLDC GLUCOMTR-MCNC: 94 MG/DL (ref 70–99)
INR PPP: 2.11 (ref 0.86–1.14)

## 2018-05-19 PROCEDURE — 99233 SBSQ HOSP IP/OBS HIGH 50: CPT | Performed by: INTERNAL MEDICINE

## 2018-05-19 PROCEDURE — A9270 NON-COVERED ITEM OR SERVICE: HCPCS | Mod: GY

## 2018-05-19 PROCEDURE — A9270 NON-COVERED ITEM OR SERVICE: HCPCS | Mod: GY | Performed by: HOSPITALIST

## 2018-05-19 PROCEDURE — 85610 PROTHROMBIN TIME: CPT | Performed by: HOSPITALIST

## 2018-05-19 PROCEDURE — 25000132 ZZH RX MED GY IP 250 OP 250 PS 637: Mod: GY | Performed by: HOSPITALIST

## 2018-05-19 PROCEDURE — 21000000 ZZH R&B IMCU HEART CARE

## 2018-05-19 PROCEDURE — 36415 COLL VENOUS BLD VENIPUNCTURE: CPT | Performed by: HOSPITALIST

## 2018-05-19 PROCEDURE — A9270 NON-COVERED ITEM OR SERVICE: HCPCS | Mod: GY | Performed by: INTERNAL MEDICINE

## 2018-05-19 PROCEDURE — 25000132 ZZH RX MED GY IP 250 OP 250 PS 637: Mod: GY | Performed by: INTERNAL MEDICINE

## 2018-05-19 PROCEDURE — 25000132 ZZH RX MED GY IP 250 OP 250 PS 637: Mod: GY

## 2018-05-19 PROCEDURE — 93971 EXTREMITY STUDY: CPT | Mod: LT

## 2018-05-19 PROCEDURE — 87040 BLOOD CULTURE FOR BACTERIA: CPT | Performed by: INTERNAL MEDICINE

## 2018-05-19 PROCEDURE — 82565 ASSAY OF CREATININE: CPT | Performed by: HOSPITALIST

## 2018-05-19 PROCEDURE — 00000146 ZZHCL STATISTIC GLUCOSE BY METER IP

## 2018-05-19 PROCEDURE — 40000884 ZZH STATISTIC STEP DOWN HRS NIGHT

## 2018-05-19 PROCEDURE — 25000128 H RX IP 250 OP 636: Performed by: HOSPITALIST

## 2018-05-19 RX ORDER — ROSUVASTATIN CALCIUM 20 MG/1
40 TABLET, COATED ORAL DAILY
Status: DISCONTINUED | OUTPATIENT
Start: 2018-05-19 | End: 2018-05-22 | Stop reason: HOSPADM

## 2018-05-19 RX ORDER — FUROSEMIDE 20 MG
20 TABLET ORAL DAILY
Status: DISCONTINUED | OUTPATIENT
Start: 2018-05-19 | End: 2018-05-22 | Stop reason: HOSPADM

## 2018-05-19 RX ORDER — EZETIMIBE 10 MG/1
10 TABLET ORAL DAILY
Status: DISCONTINUED | OUTPATIENT
Start: 2018-05-19 | End: 2018-05-22 | Stop reason: HOSPADM

## 2018-05-19 RX ORDER — WARFARIN SODIUM 7.5 MG/1
7.5 TABLET ORAL
Status: COMPLETED | OUTPATIENT
Start: 2018-05-19 | End: 2018-05-19

## 2018-05-19 RX ADMIN — METOPROLOL TARTRATE 25 MG: 25 TABLET ORAL at 21:03

## 2018-05-19 RX ADMIN — PIPERACILLIN SODIUM,TAZOBACTAM SODIUM 3.38 G: 3; .375 INJECTION, POWDER, FOR SOLUTION INTRAVENOUS at 05:01

## 2018-05-19 RX ADMIN — METOPROLOL TARTRATE 25 MG: 25 TABLET ORAL at 08:31

## 2018-05-19 RX ADMIN — FUROSEMIDE 20 MG: 20 TABLET ORAL at 17:01

## 2018-05-19 RX ADMIN — TAMSULOSIN HYDROCHLORIDE 0.4 MG: 0.4 CAPSULE ORAL at 08:31

## 2018-05-19 RX ADMIN — PIPERACILLIN SODIUM,TAZOBACTAM SODIUM 3.38 G: 3; .375 INJECTION, POWDER, FOR SOLUTION INTRAVENOUS at 22:04

## 2018-05-19 RX ADMIN — MESALAMINE 800 MG: 800 TABLET, DELAYED RELEASE ORAL at 08:31

## 2018-05-19 RX ADMIN — SODIUM CHLORIDE: 9 INJECTION, SOLUTION INTRAVENOUS at 05:57

## 2018-05-19 RX ADMIN — EZETIMIBE 10 MG: 10 TABLET ORAL at 17:01

## 2018-05-19 RX ADMIN — ROSUVASTATIN CALCIUM 40 MG: 20 TABLET, FILM COATED ORAL at 17:01

## 2018-05-19 RX ADMIN — PIPERACILLIN SODIUM,TAZOBACTAM SODIUM 3.38 G: 3; .375 INJECTION, POWDER, FOR SOLUTION INTRAVENOUS at 08:39

## 2018-05-19 RX ADMIN — PIPERACILLIN SODIUM,TAZOBACTAM SODIUM 3.38 G: 3; .375 INJECTION, POWDER, FOR SOLUTION INTRAVENOUS at 17:00

## 2018-05-19 RX ADMIN — MESALAMINE 800 MG: 800 TABLET, DELAYED RELEASE ORAL at 21:03

## 2018-05-19 RX ADMIN — WARFARIN SODIUM 7.5 MG: 7.5 TABLET ORAL at 17:01

## 2018-05-19 ASSESSMENT — PAIN DESCRIPTION - DESCRIPTORS: DESCRIPTORS: DISCOMFORT

## 2018-05-19 NOTE — PLAN OF CARE
Problem: Patient Care Overview  Goal: Plan of Care/Patient Progress Review  Outcome: No Change  Systolic BP soft 100-120. Mild temp of 99 F, no Tylenol given at that time. Other VSS. Pt denies chills this shift. Denies pain. Flu swab came back negative. Pt and family notified of result. NS infusing at 125 ml/hr. Zosyn IVPB ordered and given. Pt had biopsy to left ear PTA. Dressing in place with dried sanguineous drainage. CPAP set up by RT and pt wearing while sleeping. Plan for repeat BC 5/19 AM and possible RACHAEL (No order in for RACHAEL yet). Continue to monitor.   ~ Return to baseline functional status;  ~Dyspnea improved and oxygen saturations greater than 88% on room air or prior home oxygen levels NOT MET - BRISENO but on RA  ~ ECHO or other diagnostic tests and consults completed and resulted - MET Echo completed  ~ Vital signs normal or at patient baseline - NOT MET BP soft

## 2018-05-19 NOTE — PROGRESS NOTES
[facility]    Hospitalist Progress Note    Assessment & Plan   Fausto Farr is a 78 year old male who was admitted on 5/17/2018,  With  past medical history of AAA, coronary artery disease status post CABG, status post aortic and mitral mechanical valve replacement, ulcerative colitis, atrial fibrillation, and prostate cancer .he  presents to the Emergency Department with sepsis of unclear etiology.   Sepsis of unclear etiology:    Cellulitis left lower extremity   Group  B strep bacteremia   -Zosyn to continue   -follow up on blood and urine cultures. Blood culture x2 group b strep   - possible endocarditis is a differential as he has 2 mechanical valves  -RACHAEL ordered.  -appreciate  Infectious Disease input   - stop fluids, blood pressure stable, start lasix back due to edema   -left lower extremity doppler ultrasound to rule out deep vein thrombosis    Coronary artery disease, status post CABG:   - Patient without chest pain, EKG no new changes , troponin x2  negative.    -continue medications    Hypertension:   -metoprolol with parameters, blood pressure low   history of congestive heart failure:   - Last echo with intact EF.    - will start lasix back   hyperlipidemia  -Hold prior to admit rosuvastatin.    Benign prostatic hypertrophy:  -  Continue on prior to admit tamsulosin as he tolerates.     Ulcerative colitis:    - has multiple BM since admission   - Continue on prior to admit mesalamine.     Mechanical valve, aortic and mitral:  -  Will continue on prior to admit Coumadin with pharmacy to dose.   -international normalized ratio  therapeutic      Deep venous thrombosis prophylaxis:  Patient is already on Coumadin.   Gastrointestinal prophylaxis:  Advance diet.     Code Status: Full Code     Disposition: Expected discharge once blood cultures negative x 48 hours    Kaelyn Cutler MD  Text Page   (7am to 6pm)    Interval History    left lower extremity erythematous today and tender,  new problem for him, no shortness of breath , has developed lower extremity edema, blood pressure high, wife near bedside, we discussed about his blood cultures, anthony and cellulitis. Multiple BM since admission , no blood noted, no abdominal pain.    -Data reviewed today: I reviewed all new labs and imaging results over the last 24 hours. Reviewed admission chest x ray      Physical Exam   Temp: 97.9  F (36.6  C) Temp src: Oral BP: 160/44   Heart Rate: 57 Resp: 30 SpO2: 95 % O2 Device: None (Room air)    Vitals:    05/18/18 0115 05/18/18 0600 05/19/18 0143   Weight: 105.2 kg (232 lb) 103.4 kg (228 lb) 104.9 kg (231 lb 4.8 oz)     Vital Signs with Ranges  Temp:  [97.9  F (36.6  C)-99.5  F (37.5  C)] 97.9  F (36.6  C)  Heart Rate:  [57-90] 57  Resp:  [8-41] 30  BP: ()/(44-78) 160/44  SpO2:  [94 %-99 %] 95 %  I/O last 3 completed shifts:  In: 2936.75 [P.O.:560; I.V.:2376.75]  Out: -     Constitutional: Awake, alert, cooperative, no apparent distress,obese   Respiratory: Clear to auscultation bilaterally, no crackles or wheezing  Cardiovascular: Regular rate and rhythm, normal S1 and S2, and no murmur noted  GI: Normal bowel sounds, soft, non-distended, non-tender  Skin/Integumen: No rashes, no cyanosis, 1+ edema right lower extremity, left lower extremity erythema and tenderness present.   Neuro : moving all 4 extremities, no focal deficit noted     Medications     - MEDICATION INSTRUCTIONS -       sodium chloride Stopped (05/19/18 0839)     Warfarin Therapy Reminder         mesalamine  800 mg Oral BID     metoprolol tartrate  25 mg Oral BID     piperacillin-tazobactam  3.375 g Intravenous Q6H     sodium chloride (PF)  10 mL Intracatheter Q8H     sodium chloride (PF)  3 mL Intracatheter Q8H     tamsulosin  0.4 mg Oral Daily     warfarin  7.5 mg Oral ONCE at 18:00       Data     Recent Labs  Lab 05/19/18  0530 05/18/18  0550 05/18/18  0200   WBC  --  22.1*  --    HGB  --  11.8*  --    MCV  --  90  --    PLT  --   181  --    INR 2.11* 3.71*  --    NA  --  141  --    POTASSIUM  --  4.4  --    CHLORIDE  --  109  --    CO2  --  24  --    BUN  --  19  --    CR 0.98 1.13  --    ANIONGAP  --  8  --    AWILDA  --  7.8*  --    GLC  --  119*  --    TROPI  --  0.029 0.042       Recent Labs  Lab 05/19/18  0142 05/18/18  0550   GLC  --  119*   BGM 94  --        Imaging:   No results found for this or any previous visit (from the past 24 hour(s)).

## 2018-05-19 NOTE — PROGRESS NOTES
Pt was placed on CPAP 8 and 30%, alarm setting was set at 10. RT will continue to monitor the pt.    Tim Wyatt RT.

## 2018-05-19 NOTE — PLAN OF CARE
Problem: Patient Care Overview  Goal: Plan of Care/Patient Progress Review  Outcome: No Change  BP's dropped post AM metoprolol, as low as 87/62, pt asymptomatic, BP's improved throughout day.  Sats well on RA.  Tmax 101.5, improved with PRN Tylneol.  Pt did have mild rigors without temp x1 today.  Up with SBA, amb to bathroom.  Freq small stools from colitis, unable to get accurate I and O.  + blood cultures faxed from urgency room, MD notified.  ID consulted today, d/c'd vanco.  Remains on Zosyn.  INR 3.7, 4mg Coumadin given.  CPAP ordered for HS, pt did not bring home CPAP with.  Used O2 last night.

## 2018-05-19 NOTE — PLAN OF CARE
Problem: Sepsis/Septic Shock (Adult)  Goal: Signs and Symptoms of Listed Potential Problems Will be Absent, Minimized or Managed (Sepsis/Septic Shock)  Signs and symptoms of listed potential problems will be absent, minimized or managed by discharge/transition of care (reference Sepsis/Septic Shock (Adult) CPG).   Outcome: No Change  Unsure of source of sepsis.  Lactic acids have trended to normal.  Temps low grade. BP stable. Uneventful night. SR/bundle branch.  Possible RACHAEL

## 2018-05-20 ENCOUNTER — TRANSFERRED RECORDS (OUTPATIENT)
Dept: HEALTH INFORMATION MANAGEMENT | Facility: CLINIC | Age: 78
End: 2018-05-20

## 2018-05-20 LAB
ANION GAP SERPL CALCULATED.3IONS-SCNC: 5 MMOL/L (ref 3–14)
BUN SERPL-MCNC: 14 MG/DL (ref 7–30)
CALCIUM SERPL-MCNC: 8.3 MG/DL (ref 8.5–10.1)
CHLORIDE SERPL-SCNC: 110 MMOL/L (ref 94–109)
CO2 SERPL-SCNC: 28 MMOL/L (ref 20–32)
CREAT SERPL-MCNC: 0.9 MG/DL (ref 0.66–1.25)
ERYTHROCYTE [DISTWIDTH] IN BLOOD BY AUTOMATED COUNT: 14.1 % (ref 10–15)
GFR SERPL CREATININE-BSD FRML MDRD: 82 ML/MIN/1.7M2
GLUCOSE SERPL-MCNC: 109 MG/DL (ref 70–99)
HCT VFR BLD AUTO: 33.7 % (ref 40–53)
HGB BLD-MCNC: 11.4 G/DL (ref 13.3–17.7)
INR PPP: 1.83 (ref 0.86–1.14)
LMWH PPP CHRO-ACNC: 0.13 IU/ML
MCH RBC QN AUTO: 30.5 PG (ref 26.5–33)
MCHC RBC AUTO-ENTMCNC: 33.8 G/DL (ref 31.5–36.5)
MCV RBC AUTO: 90 FL (ref 78–100)
PLATELET # BLD AUTO: 177 10E9/L (ref 150–450)
POTASSIUM SERPL-SCNC: 3.4 MMOL/L (ref 3.4–5.3)
RBC # BLD AUTO: 3.74 10E12/L (ref 4.4–5.9)
SODIUM SERPL-SCNC: 143 MMOL/L (ref 133–144)
WBC # BLD AUTO: 8.8 10E9/L (ref 4–11)

## 2018-05-20 PROCEDURE — 25000128 H RX IP 250 OP 636: Performed by: INTERNAL MEDICINE

## 2018-05-20 PROCEDURE — 80048 BASIC METABOLIC PNL TOTAL CA: CPT | Performed by: HOSPITALIST

## 2018-05-20 PROCEDURE — 85027 COMPLETE CBC AUTOMATED: CPT | Performed by: HOSPITALIST

## 2018-05-20 PROCEDURE — A9270 NON-COVERED ITEM OR SERVICE: HCPCS | Mod: GY | Performed by: HOSPITALIST

## 2018-05-20 PROCEDURE — 87040 BLOOD CULTURE FOR BACTERIA: CPT | Performed by: INTERNAL MEDICINE

## 2018-05-20 PROCEDURE — 25000132 ZZH RX MED GY IP 250 OP 250 PS 637: Mod: GY | Performed by: HOSPITALIST

## 2018-05-20 PROCEDURE — 21000000 ZZH R&B IMCU HEART CARE

## 2018-05-20 PROCEDURE — 36415 COLL VENOUS BLD VENIPUNCTURE: CPT | Performed by: INTERNAL MEDICINE

## 2018-05-20 PROCEDURE — 25000128 H RX IP 250 OP 636: Performed by: HOSPITALIST

## 2018-05-20 PROCEDURE — A9270 NON-COVERED ITEM OR SERVICE: HCPCS | Mod: GY | Performed by: INTERNAL MEDICINE

## 2018-05-20 PROCEDURE — 99233 SBSQ HOSP IP/OBS HIGH 50: CPT | Performed by: INTERNAL MEDICINE

## 2018-05-20 PROCEDURE — 36415 COLL VENOUS BLD VENIPUNCTURE: CPT | Performed by: HOSPITALIST

## 2018-05-20 PROCEDURE — 85610 PROTHROMBIN TIME: CPT | Performed by: HOSPITALIST

## 2018-05-20 PROCEDURE — 25000132 ZZH RX MED GY IP 250 OP 250 PS 637: Mod: GY | Performed by: INTERNAL MEDICINE

## 2018-05-20 PROCEDURE — 85520 HEPARIN ASSAY: CPT | Performed by: HOSPITALIST

## 2018-05-20 RX ORDER — WARFARIN SODIUM 4 MG/1
8 TABLET ORAL
Status: DISCONTINUED | OUTPATIENT
Start: 2018-05-20 | End: 2018-05-20

## 2018-05-20 RX ORDER — WARFARIN SODIUM 10 MG/1
10 TABLET ORAL
Status: COMPLETED | OUTPATIENT
Start: 2018-05-20 | End: 2018-05-20

## 2018-05-20 RX ORDER — CEFTRIAXONE 2 G/1
2 INJECTION, POWDER, FOR SOLUTION INTRAMUSCULAR; INTRAVENOUS EVERY 24 HOURS
Status: DISCONTINUED | OUTPATIENT
Start: 2018-05-20 | End: 2018-05-22 | Stop reason: HOSPADM

## 2018-05-20 RX ADMIN — Medication 1500 UNITS: at 23:49

## 2018-05-20 RX ADMIN — ACETAMINOPHEN 650 MG: 325 TABLET ORAL at 08:26

## 2018-05-20 RX ADMIN — ACETAMINOPHEN 650 MG: 325 TABLET ORAL at 21:20

## 2018-05-20 RX ADMIN — CEFTRIAXONE 2 G: 2 INJECTION, POWDER, FOR SOLUTION INTRAMUSCULAR; INTRAVENOUS at 10:38

## 2018-05-20 RX ADMIN — METOPROLOL TARTRATE 25 MG: 25 TABLET ORAL at 21:20

## 2018-05-20 RX ADMIN — MESALAMINE 800 MG: 800 TABLET, DELAYED RELEASE ORAL at 08:26

## 2018-05-20 RX ADMIN — EZETIMIBE 10 MG: 10 TABLET ORAL at 08:26

## 2018-05-20 RX ADMIN — MESALAMINE 800 MG: 800 TABLET, DELAYED RELEASE ORAL at 21:20

## 2018-05-20 RX ADMIN — TAMSULOSIN HYDROCHLORIDE 0.4 MG: 0.4 CAPSULE ORAL at 08:26

## 2018-05-20 RX ADMIN — FUROSEMIDE 20 MG: 20 TABLET ORAL at 08:26

## 2018-05-20 RX ADMIN — WARFARIN SODIUM 10 MG: 10 TABLET ORAL at 17:24

## 2018-05-20 RX ADMIN — ACETAMINOPHEN 650 MG: 325 TABLET ORAL at 15:30

## 2018-05-20 RX ADMIN — ROSUVASTATIN CALCIUM 40 MG: 20 TABLET, FILM COATED ORAL at 08:26

## 2018-05-20 RX ADMIN — HEPARIN SODIUM 950 UNITS/HR: 10000 INJECTION, SOLUTION INTRAVENOUS at 14:43

## 2018-05-20 RX ADMIN — Medication 1 MG: at 23:53

## 2018-05-20 RX ADMIN — PIPERACILLIN SODIUM,TAZOBACTAM SODIUM 3.38 G: 3; .375 INJECTION, POWDER, FOR SOLUTION INTRAVENOUS at 04:58

## 2018-05-20 RX ADMIN — METOPROLOL TARTRATE 25 MG: 25 TABLET ORAL at 08:26

## 2018-05-20 ASSESSMENT — PAIN DESCRIPTION - DESCRIPTORS
DESCRIPTORS: DISCOMFORT
DESCRIPTORS: ACHING
DESCRIPTORS: DISCOMFORT

## 2018-05-20 NOTE — PROGRESS NOTES
[facility]    Hospitalist Progress Note    Assessment & Plan   Fausto Farr is a 78 year old male who was admitted on 5/17/2018,  With  past medical history of AAA, coronary artery disease status post CABG, status post aortic and mitral mechanical valve replacement, ulcerative colitis, atrial fibrillation, and prostate cancer .he  presents to the Emergency Department with sepsis of unclear etiology.  Presented with sepsis of unclear origin   Cellulitis left lower extremity -noted on day 2   Group  B strep bacteremia   -Zosyn 5/20 changed to rocephin   -follow up on blood and urine cultures. Blood culture x2 group b strep   -repeat blood cultures negative, possibly bactremia from cellulitis .  - possible endocarditis is a differential as he has 2 mechanical valves  -RACHAEL ordered.  -appreciate  Infectious Disease input   - stop fluids, blood pressure stable, start lasix at home dose   -left lower extremity doppler ultrasound negative for  deep vein thrombosis    Coronary artery disease, status post CABG:   - Patient without chest pain, EKG no new changes , troponin x2  negative.    -continue medications    Hypertension:   -metoprolol with parameters, blood pressure low   history of congestive heart failure:   - Last echo with intact EF. repeat  2 d echocardiogram, no new changes   - back on home dose lasix   hyperlipidemia  -on PTA rosuvastatin.    Benign prostatic hypertrophy:  -  Continue on prior to admit tamsulosin as he tolerates.     Ulcerative colitis:    - has multiple BM since admission   - Continue on prior to admit mesalamine.     Mechanical valve, aortic and mitral:  -   Coumadin with pharmacy to dose.   -international normalized ratio low, will start on heparin drip.      Deep venous thrombosis prophylaxis:  Patient is already on Coumadin.   Gastrointestinal prophylaxis:  Advance diet.     Code Status: Full Code     Disposition: Expected discharge once blood cultures negative x 48  hours  And RACHAEL negative.  Kaelyn Cutler MD  Text Page   (7am to 6pm)    Interval History   His left lower extremity continues to be erythematous, pain improved.  Discussed the results of the 2D echo and the need for RACHAEL.  Recommended to elevate his left lower extremity while resting.  He is not feeling any dizziness or shortness of breath .  No fever, blood pressure stable.    -Data reviewed today: I reviewed all new labs and imaging results over the last 24 hours. Reviewed admission chest x ray      Physical Exam   Temp: 97.8  F (36.6  C) Temp src: Oral BP: 124/81   Heart Rate: 67 Resp: 16 SpO2: 99 % O2 Device: None (Room air)    Vitals:    05/18/18 0600 05/19/18 0143 05/20/18 0807   Weight: 103.4 kg (228 lb) 104.9 kg (231 lb 4.8 oz) 101.2 kg (223 lb 3.2 oz)     Vital Signs with Ranges  Temp:  [97.4  F (36.3  C)-98.6  F (37  C)] 97.8  F (36.6  C)  Heart Rate:  [67-81] 67  Resp:  [16-18] 16  BP: (117-155)/(78-99) 124/81  SpO2:  [94 %-99 %] 99 %  I/O last 3 completed shifts:  In: 2667.25 [P.O.:1480; I.V.:1187.25]  Out: -     Constitutional: Awake, alert, cooperative, no apparent distress,obese   Respiratory: Clear to auscultation bilaterally, no crackles or wheezing  Cardiovascular: Regular rate and rhythm, normal S1 and S2, and no murmur noted  GI: Normal bowel sounds, soft, non-distended, non-tender  Skin/Integumen: No rashes, no cyanosis, 1+ edema right lower extremity, left lower extremity erythema and tenderness present.   Neuro : moving all 4 extremities, no focal deficit noted     Medications     - MEDICATION INSTRUCTIONS -       - MEDICATION INSTRUCTIONS -       Warfarin Therapy Reminder         cefTRIAXone  2 g Intravenous Q24H     ezetimibe  10 mg Oral Daily     furosemide  20 mg Oral Daily     mesalamine  800 mg Oral BID     metoprolol tartrate  25 mg Oral BID     rosuvastatin  40 mg Oral Daily     sodium chloride (PF)  3 mL Intracatheter Q8H     tamsulosin  0.4 mg Oral Daily     warfarin  10 mg Oral  ONCE at 18:00       Data     Recent Labs  Lab 05/20/18  0540 05/19/18  0530 05/18/18  0550 05/18/18  0200   WBC 8.8  --  22.1*  --    HGB 11.4*  --  11.8*  --    MCV 90  --  90  --      --  181  --    INR 1.83* 2.11* 3.71*  --      --  141  --    POTASSIUM 3.4  --  4.4  --    CHLORIDE 110*  --  109  --    CO2 28  --  24  --    BUN 14  --  19  --    CR 0.90 0.98 1.13  --    ANIONGAP 5  --  8  --    AWILDA 8.3*  --  7.8*  --    *  --  119*  --    TROPI  --   --  0.029 0.042       Recent Labs  Lab 05/20/18  0540 05/19/18  0142 05/18/18  0550   *  --  119*   BGM  --  94  --        Imaging:   Recent Results (from the past 24 hour(s))   US Lower Extremity Venous Duplex Left    Narrative    VENOUS ULTRASOUND LEFT LOWER EXTREMITY  5/19/2018 3:37 PM     HISTORY: Rule out DVT.    COMPARISON: None.    TECHNIQUE: Color Doppler and spectral waveform analysis performed  throughout the deep veins of the left lower extremity.    FINDINGS: The left common femoral, proximal greater saphenous,  femoral, and popliteal veins demonstrate normal blood flow,  compression, and augmentation. Posterior tibial and peroneal veins are  compressible. Right common femoral vein also patent.      Impression    IMPRESSION: Negative for deep venous thrombosis in the left lower  extremity.    HARJIT STANLEY MD

## 2018-05-20 NOTE — PLAN OF CARE
Problem: Patient Care Overview  Goal: Plan of Care/Patient Progress Review  Outcome: Improving  VS stable, afebrile, WBC now WNL.  C/o R shoulder pain, relief with PRN Tylenol.  INR subtherapeutic 1.83 (goal 2.5-3.5), Heparin gtt started for bridging.  10mg Coumadin given tonight.  Wt much improved after lasix started 5/19, wt down 8#, edema improving.  LLE swollen, red/pink from cellulitis, but improving from yesterday. Not hot and less red.  Pt to have RACHAEL tomorrow.  IV zosyn switched to Rocephin.  Blood cultures remain no growth.

## 2018-05-20 NOTE — PROGRESS NOTES
Note Infectious Disease Consult Service Progress Note   Pt Name Fausto Farr   Date 05/20/2018   MRN [id number]       Notes / labs / imaging test results and other data were reviewed    CHIEF COMPLAINT: REASON FOR VISIT    Fever / chills      HPI  79 yo w AVR and MVR and aortic aneurysm now Group B Strep sepsis      PFSH:  Personal / Family / Social Histories were reviewed and updated  nothing new      CURRENT MED REVIEWD      Prescription Medications as of 5/20/2018             ASPIRIN NOT PRESCRIBED (INTENTIONAL) Please choose reason not prescribed, below    betamethasone valerate (VALISONE) 0.1 % lotion APPLY TOPICALLY TWICE DAILY PRN    ezetimibe (ZETIA) 10 MG tablet Take 1 tablet (10 mg) by mouth daily    fluocinonide (LIDEX) 0.05 % ointment 2- 30 gram tubes.  Apply twice a day as needed.    furosemide (LASIX) 40 MG tablet Take 0.5 tablets (20 mg) by mouth daily    mesalamine (ASACOL HD) 800 MG EC tablet Take 1 tablet (800 mg) by mouth 2 times daily    metoprolol tartrate (LOPRESSOR) 25 MG tablet Take 1 tablet (25 mg) by mouth 2 times daily    rosuvastatin (CRESTOR) 40 MG tablet Take 1 tablet (40 mg) by mouth daily    tamsulosin (FLOMAX) 0.4 MG capsule Take 1 capsule (0.4 mg) by mouth daily    warfarin (COUMADIN) 5 MG tablet Take 1 1/2 tablets (7.5 mg) by mouth TWTFSS; 1 tablet (5 mg) on Mondays OR as directed by INR Clinic      Facility Administered Medications as of 5/20/2018             acetaminophen (TYLENOL) tablet 650 mg Take 2 tablets (650 mg) by mouth every 4 hours as needed for mild pain    bisacodyl (DULCOLAX) Suppository 10 mg Place 1 suppository (10 mg) rectally daily as needed for constipation    ezetimibe (ZETIA) tablet 10 mg Take 1 tablet (10 mg) by mouth daily    furosemide (LASIX) tablet 20 mg Take 1 tablet (20 mg) by mouth daily    melatonin tablet 1 mg Take 1 tablet (1 mg) by mouth nightly as needed for sleep    mesalamine (ASACOL HD) EC tablet 800 mg Take 1 tablet (800 mg) by mouth 2  "times daily    metoprolol tartrate (LOPRESSOR) tablet 25 mg Take 1 tablet (25 mg) by mouth 2 times daily    naloxone (NARCAN) injection 0.1-0.4 mg Inject 0.25-1 mLs (0.1-0.4 mg) into the vein every 2 minutes as needed for opioid reversal    ondansetron (ZOFRAN) injection 4 mg Inject 2 mLs (4 mg) into the vein every 6 hours as needed for nausea or vomiting    Linked Group 1:  \"Or\" Linked Group Details     ondansetron (ZOFRAN-ODT) ODT tab 4 mg Take 1 tablet (4 mg) by mouth every 6 hours as needed for nausea or vomiting    Linked Group 1:  \"Or\" Linked Group Details     Patient is already receiving anticoagulation with heparin, enoxaparin (LOVENOX), warfarin (COUMADIN)  or other anticoagulant medication continuous prn    piperacillin-tazobactam (ZOSYN) 3.375 g vial to attach to  mL bag Inject 3.375 g into the vein every 6 hours    polyethylene glycol (MIRALAX/GLYCOLAX) Packet 17 g Take 17 g by mouth daily as needed for constipation    potassium chloride (KLOR-CON) Packet 20-40 mEq 20-40 mEq by Oral or Feeding Tube route every 2 hours as needed for potassium supplementation    potassium chloride 10 mEq in 100 mL intermittent infusion with 10 mg lidocaine Inject 100 mLs (10 mEq) into the vein every hour as needed for potassium supplementation    potassium chloride 10 mEq in 100 mL sterile water intermittent infusion (premix) Inject 100 mLs (10 mEq) into the vein every hour as needed for potassium supplementation    potassium chloride 20 mEq in 50 mL intermittent infusion Inject 50 mLs (20 mEq) into the vein every hour as needed for potassium supplementation    potassium chloride SA (K-DUR/KLOR-CON M) CR tablet 20-40 mEq Take 1-2 tablets (20-40 mEq) by mouth every 2 hours as needed for potassium supplementation    rosuvastatin (CRESTOR) tablet 40 mg Take 2 tablets (40 mg) by mouth daily    sodium chloride (PF) 0.9% PF flush 3 mL 3 mLs by Intracatheter route every hour as needed for line flush (for peripheral IV flush " "post IV meds)    sodium chloride (PF) 0.9% PF flush 3 mL 3 mLs by Intracatheter route every 8 hours    tamsulosin (FLOMAX) capsule 0.4 mg Take 1 capsule (0.4 mg) by mouth daily    warfarin (COUMADIN) tablet 10 mg Take 1 tablet (10 mg) by mouth Once at 6pm    warfarin (COUMADIN) tablet 7.5 mg Take 1 tablet (7.5 mg) by mouth Once at 6pm    Warfarin Therapy Reminder (Check START DATE - warfarin may be starting in the FUTURE) 1 each continuous prn    lidocaine (LMX4) cream (Discontinued) Apply topically every hour as needed for pain (with VAD insertion or accessing implanted port.)    lidocaine 1 % 1 mL (Discontinued) 1 mL by Other route every hour as needed (mild pain with VAD insertion or accessing implanted port)    nitroGLYcerin (NITROSTAT) sublingual tablet 0.4 mg (Discontinued) Place 1 tablet (0.4 mg) under the tongue every 5 minutes as needed for chest pain    sodium chloride (PF) 0.9% PF flush 10 mL (Discontinued) 10 mLs by Intracatheter route every 8 hours    sodium chloride (PF) 0.9% PF flush 3 mL (Discontinued) 3 mLs by Intracatheter route every hour as needed for line flush (for peripheral IV flush post IV meds)    sodium chloride 0.9% infusion (Discontinued) Inject into the vein continuous    warfarin (COUMADIN) tablet 8 mg (Discontinued) Take 2 tablets (8 mg) by mouth Once at 6pm          Vital Signs: /81 (BP Location: Right arm)  Pulse 105  Temp 97.4  F (36.3  C) (Oral)  Resp 16  Ht 1.664 m (5' 5.5\")  Wt 101.2 kg (223 lb 3.2 oz)  SpO2 99%  BMI 36.58 kg/m2      Temp (24hrs)  Max:  98.6  F     Data  Cultures    5.19 Blood Neg     LABS  Lab Results   Component Value Date/Time    WBC 8.8 05/20/2018 05:40 AM    WBC 22.1 (H) 05/18/2018 05:50 AM    WBC 8.2 05/08/2018 10:43 AM    WBC 10.6 04/25/2017 12:44 PM    CREAT 1.0 06/22/2017 09:46 AM    CREAT 1.1 06/22/2016 09:50 AM    CREAT 0.9 04/15/2008 02:34 PM    AST 29 05/08/2018 10:43 AM    AST 25 04/25/2017 12:44 PM    AST 28 07/18/2016 09:32 AM    AST " 41 03/21/2016 02:25 PM    ALT 32 05/08/2018 10:43 AM    ALT 27 04/25/2017 12:44 PM    ALT 35 07/18/2016 09:32 AM    ALT 63 03/21/2016 02:25 PM    ALKPHOS 43 05/08/2018 10:43 AM    ALKPHOS 50 04/25/2017 12:44 PM    ALKPHOS 54 03/21/2016 02:25 PM    ALKPHOS 37 (L) 10/30/2015 07:43 AM           ASSESSMENT & SUGGESTIONS    (A40.1) Sepsis due to group B Streptococcus (H)  (primary encounter diagnosis)  (A41.9,  R65.20) Severe sepsis (H)  (I38) Valvular heart disease  (Z95.2) S/P aortic valve replacement  (Z95.2) S/P mitral valve replacement    Group B strep sepsis w high risk for infective endocarditis / aortitis  Blood culture appears neg  Clinically better = stable (euthermic / normal WBC)  Ceftriaxone drug of choice     Change pip-eliana to ceftriaxone  Await RACHAEL to evaluate prosthetic valves / aorta           Eddi Singh MD  Covering for [employer]avez [employer] [place]  [facility] Infectious Diseases  [phone]

## 2018-05-20 NOTE — PLAN OF CARE
Problem: Patient Care Overview  Goal: Plan of Care/Patient Progress Review  Outcome: Improving  Pt VSS on RA. Tele SR w/ BBB. A&Ox4. Up with SBA, using bathroom. No BM over shift. Denies pain. Given abxs overnight. Pt refused to wear cpap. Slept. Plan for RACHAEL on Monday, pt continues to have negative blood cultures. Continue to monitor.

## 2018-05-20 NOTE — PLAN OF CARE
Problem: Patient Care Overview  Goal: Plan of Care/Patient Progress Review  Outcome: Improving  Pt's VS stable this am, BP higher, Afebrile.  Wt up 3# from yesterday, BLE edema noted to BLE. L>R.  Pt states he feels fluid build up in hands and legs.  PTA lasix reordered, IVF stopped.  LLE appears swollen, red, hot, painful.  US done and neg for DVT.  MD dx as cellulitis, continues on IV Zosyn.  Blood cultures no growth.  Daily BC's ordered.  INR 2.11, 7.5 mg Coumadin given. Continues to have loose/diarrhea for active colitis.  RACHAEL ordered for Monday 5/21.

## 2018-05-21 ENCOUNTER — APPOINTMENT (OUTPATIENT)
Dept: CARDIOLOGY | Facility: CLINIC | Age: 78
DRG: 872 | End: 2018-05-21
Attending: INTERNAL MEDICINE
Payer: MEDICARE

## 2018-05-21 LAB
ANION GAP SERPL CALCULATED.3IONS-SCNC: 8 MMOL/L (ref 3–14)
BUN SERPL-MCNC: 14 MG/DL (ref 7–30)
CALCIUM SERPL-MCNC: 8.7 MG/DL (ref 8.5–10.1)
CHLORIDE SERPL-SCNC: 107 MMOL/L (ref 94–109)
CO2 SERPL-SCNC: 28 MMOL/L (ref 20–32)
CREAT SERPL-MCNC: 0.88 MG/DL (ref 0.66–1.25)
ERYTHROCYTE [DISTWIDTH] IN BLOOD BY AUTOMATED COUNT: 13.8 % (ref 10–15)
GFR SERPL CREATININE-BSD FRML MDRD: 84 ML/MIN/1.7M2
GLUCOSE SERPL-MCNC: 107 MG/DL (ref 70–99)
HCT VFR BLD AUTO: 35.2 % (ref 40–53)
HGB BLD-MCNC: 11.6 G/DL (ref 13.3–17.7)
INR PPP: 2.26 (ref 0.86–1.14)
LMWH PPP CHRO-ACNC: 0.19 IU/ML
MAGNESIUM SERPL-MCNC: 1.9 MG/DL (ref 1.6–2.3)
MCH RBC QN AUTO: 29.7 PG (ref 26.5–33)
MCHC RBC AUTO-ENTMCNC: 33 G/DL (ref 31.5–36.5)
MCV RBC AUTO: 90 FL (ref 78–100)
PLATELET # BLD AUTO: 214 10E9/L (ref 150–450)
POTASSIUM SERPL-SCNC: 3.6 MMOL/L (ref 3.4–5.3)
RBC # BLD AUTO: 3.91 10E12/L (ref 4.4–5.9)
SODIUM SERPL-SCNC: 143 MMOL/L (ref 133–144)
WBC # BLD AUTO: 6.1 10E9/L (ref 4–11)

## 2018-05-21 PROCEDURE — 40000235 ZZH STATISTIC TELEMETRY

## 2018-05-21 PROCEDURE — A9270 NON-COVERED ITEM OR SERVICE: HCPCS | Mod: GY | Performed by: INTERNAL MEDICINE

## 2018-05-21 PROCEDURE — 83735 ASSAY OF MAGNESIUM: CPT | Performed by: HOSPITALIST

## 2018-05-21 PROCEDURE — 93325 DOPPLER ECHO COLOR FLOW MAPG: CPT | Mod: 26 | Performed by: INTERNAL MEDICINE

## 2018-05-21 PROCEDURE — 87040 BLOOD CULTURE FOR BACTERIA: CPT | Performed by: INTERNAL MEDICINE

## 2018-05-21 PROCEDURE — 93312 ECHO TRANSESOPHAGEAL: CPT | Mod: 26 | Performed by: INTERNAL MEDICINE

## 2018-05-21 PROCEDURE — 40000857 ZZH STATISTIC TEE INCLUDES SEDATION

## 2018-05-21 PROCEDURE — 99232 SBSQ HOSP IP/OBS MODERATE 35: CPT | Performed by: INTERNAL MEDICINE

## 2018-05-21 PROCEDURE — 25000132 ZZH RX MED GY IP 250 OP 250 PS 637: Mod: GY | Performed by: HOSPITALIST

## 2018-05-21 PROCEDURE — 25000125 ZZHC RX 250: Performed by: INTERNAL MEDICINE

## 2018-05-21 PROCEDURE — 25000128 H RX IP 250 OP 636: Performed by: HOSPITALIST

## 2018-05-21 PROCEDURE — 25000128 H RX IP 250 OP 636: Performed by: INTERNAL MEDICINE

## 2018-05-21 PROCEDURE — 93320 DOPPLER ECHO COMPLETE: CPT | Mod: 26 | Performed by: INTERNAL MEDICINE

## 2018-05-21 PROCEDURE — 25000132 ZZH RX MED GY IP 250 OP 250 PS 637: Mod: GY | Performed by: INTERNAL MEDICINE

## 2018-05-21 PROCEDURE — 85610 PROTHROMBIN TIME: CPT | Performed by: HOSPITALIST

## 2018-05-21 PROCEDURE — 93320 DOPPLER ECHO COMPLETE: CPT

## 2018-05-21 PROCEDURE — 80048 BASIC METABOLIC PNL TOTAL CA: CPT | Performed by: HOSPITALIST

## 2018-05-21 PROCEDURE — A9270 NON-COVERED ITEM OR SERVICE: HCPCS | Mod: GY | Performed by: HOSPITALIST

## 2018-05-21 PROCEDURE — 85027 COMPLETE CBC AUTOMATED: CPT | Performed by: HOSPITALIST

## 2018-05-21 PROCEDURE — 36415 COLL VENOUS BLD VENIPUNCTURE: CPT | Performed by: HOSPITALIST

## 2018-05-21 PROCEDURE — 21000000 ZZH R&B IMCU HEART CARE

## 2018-05-21 PROCEDURE — 85520 HEPARIN ASSAY: CPT | Performed by: HOSPITALIST

## 2018-05-21 RX ORDER — LIDOCAINE HYDROCHLORIDE 40 MG/ML
1.5 SOLUTION TOPICAL ONCE
Status: COMPLETED | OUTPATIENT
Start: 2018-05-21 | End: 2018-05-21

## 2018-05-21 RX ORDER — GLYCOPYRROLATE 0.2 MG/ML
0.1 INJECTION, SOLUTION INTRAMUSCULAR; INTRAVENOUS ONCE
Status: COMPLETED | OUTPATIENT
Start: 2018-05-21 | End: 2018-05-21

## 2018-05-21 RX ORDER — SODIUM CHLORIDE 9 MG/ML
INJECTION, SOLUTION INTRAVENOUS CONTINUOUS PRN
Status: DISCONTINUED | OUTPATIENT
Start: 2018-05-21 | End: 2018-05-22 | Stop reason: HOSPADM

## 2018-05-21 RX ORDER — NALOXONE HYDROCHLORIDE 0.4 MG/ML
.1-.4 INJECTION, SOLUTION INTRAMUSCULAR; INTRAVENOUS; SUBCUTANEOUS
Status: DISCONTINUED | OUTPATIENT
Start: 2018-05-21 | End: 2018-05-22 | Stop reason: HOSPADM

## 2018-05-21 RX ORDER — FENTANYL CITRATE 50 UG/ML
25-50 INJECTION, SOLUTION INTRAMUSCULAR; INTRAVENOUS
Status: COMPLETED | OUTPATIENT
Start: 2018-05-21 | End: 2018-05-21

## 2018-05-21 RX ORDER — CEFTRIAXONE 1 G/1
2000 INJECTION, POWDER, FOR SOLUTION INTRAMUSCULAR; INTRAVENOUS DAILY
Qty: 600 ML | Refills: 0 | DISCHARGE
Start: 2018-05-21 | End: 2018-05-31

## 2018-05-21 RX ORDER — FENTANYL CITRATE 50 UG/ML
25 INJECTION, SOLUTION INTRAMUSCULAR; INTRAVENOUS
Status: DISPENSED | OUTPATIENT
Start: 2018-05-21 | End: 2018-05-22

## 2018-05-21 RX ORDER — WARFARIN SODIUM 7.5 MG/1
7.5 TABLET ORAL
Status: COMPLETED | OUTPATIENT
Start: 2018-05-21 | End: 2018-05-21

## 2018-05-21 RX ORDER — FLUMAZENIL 0.1 MG/ML
0.2 INJECTION, SOLUTION INTRAVENOUS
Status: DISCONTINUED | OUTPATIENT
Start: 2018-05-21 | End: 2018-05-22 | Stop reason: HOSPADM

## 2018-05-21 RX ADMIN — LIDOCAINE HYDROCHLORIDE 1.5 ML: 40 SOLUTION TOPICAL at 10:44

## 2018-05-21 RX ADMIN — MIDAZOLAM HYDROCHLORIDE 0.5 MG: 1 INJECTION, SOLUTION INTRAMUSCULAR; INTRAVENOUS at 11:10

## 2018-05-21 RX ADMIN — METOPROLOL TARTRATE 25 MG: 25 TABLET ORAL at 10:31

## 2018-05-21 RX ADMIN — FENTANYL CITRATE 50 MCG: 50 INJECTION, SOLUTION INTRAMUSCULAR; INTRAVENOUS at 11:08

## 2018-05-21 RX ADMIN — MIDAZOLAM HYDROCHLORIDE 0.5 MG: 1 INJECTION, SOLUTION INTRAMUSCULAR; INTRAVENOUS at 11:31

## 2018-05-21 RX ADMIN — Medication 1 MG: at 21:15

## 2018-05-21 RX ADMIN — FENTANYL CITRATE 50 MCG: 50 INJECTION, SOLUTION INTRAMUSCULAR; INTRAVENOUS at 11:16

## 2018-05-21 RX ADMIN — ACETAMINOPHEN 650 MG: 325 TABLET ORAL at 21:15

## 2018-05-21 RX ADMIN — ROSUVASTATIN CALCIUM 40 MG: 20 TABLET, FILM COATED ORAL at 10:30

## 2018-05-21 RX ADMIN — TOPICAL ANESTHETIC 0.5 ML: 200 SPRAY DENTAL; PERIODONTAL at 11:01

## 2018-05-21 RX ADMIN — MESALAMINE 800 MG: 800 TABLET, DELAYED RELEASE ORAL at 21:18

## 2018-05-21 RX ADMIN — TAMSULOSIN HYDROCHLORIDE 0.4 MG: 0.4 CAPSULE ORAL at 10:31

## 2018-05-21 RX ADMIN — GLYCOPYRROLATE 0.1 MG: 0.2 INJECTION, SOLUTION INTRAMUSCULAR; INTRAVENOUS at 10:40

## 2018-05-21 RX ADMIN — WARFARIN SODIUM 7.5 MG: 7.5 TABLET ORAL at 18:03

## 2018-05-21 RX ADMIN — FUROSEMIDE 20 MG: 20 TABLET ORAL at 10:31

## 2018-05-21 RX ADMIN — MIDAZOLAM HYDROCHLORIDE 2 MG: 1 INJECTION, SOLUTION INTRAMUSCULAR; INTRAVENOUS at 11:08

## 2018-05-21 RX ADMIN — MESALAMINE 800 MG: 800 TABLET, DELAYED RELEASE ORAL at 10:31

## 2018-05-21 RX ADMIN — EZETIMIBE 10 MG: 10 TABLET ORAL at 10:31

## 2018-05-21 RX ADMIN — HEPARIN SODIUM 1100 UNITS/HR: 10000 INJECTION, SOLUTION INTRAVENOUS at 12:52

## 2018-05-21 RX ADMIN — ACETAMINOPHEN 650 MG: 325 TABLET ORAL at 05:48

## 2018-05-21 RX ADMIN — CEFTRIAXONE 2 G: 2 INJECTION, POWDER, FOR SOLUTION INTRAMUSCULAR; INTRAVENOUS at 10:15

## 2018-05-21 RX ADMIN — MIDAZOLAM HYDROCHLORIDE 1 MG: 1 INJECTION, SOLUTION INTRAMUSCULAR; INTRAVENOUS at 11:16

## 2018-05-21 RX ADMIN — METOPROLOL TARTRATE 25 MG: 25 TABLET ORAL at 21:15

## 2018-05-21 ASSESSMENT — PAIN DESCRIPTION - DESCRIPTORS
DESCRIPTORS: ACHING
DESCRIPTORS: ACHING

## 2018-05-21 NOTE — PROGRESS NOTES
[facility]    Infectious Disease Progress Note    Date of Service (when I saw the patient): 05/21/2018     Assessment & Plan   Fausto Farr is a 78 year old male who was admitted on 5/17/2018.     Impression:  1. 78 male with history of Aortic valve and mitral valve replacement.   2. Admitted with sudden onset fevers and chills.   3. Suspicion for endocarditis given  cardiac history .   4. Blood cultures positive for group B strep.   5. Multiple scabs on the LE, the ? Potential source.      Recommendations:   RACHAEL negative for endocarditis. IV ceftriaxone for 2 weeks.   Midline ok.   Orders for D/C antibiotics in the chart.           Cassandra Floyd MD    Interval History   Afebrile   Feels better   No more positive cultures   RACHAEL today     Physical Exam   Temp: 97.6  F (36.4  C) Temp src: Oral BP: 154/89   Heart Rate: 71 Resp: 18 SpO2: 97 % O2 Device: None (Room air)    Vitals:    05/19/18 0143 05/20/18 0807 05/21/18 0700   Weight: 104.9 kg (231 lb 4.8 oz) 101.2 kg (223 lb 3.2 oz) 100.3 kg (221 lb 1.6 oz)     Vital Signs with Ranges  Temp:  [97.3  F (36.3  C)-97.8  F (36.6  C)] 97.6  F (36.4  C)  Heart Rate:  [66-71] 71  Resp:  [16-18] 18  BP: (115-154)/(66-89) 154/89  SpO2:  [97 %-98 %] 97 %    Constitutional: Awake, alert, cooperative, no apparent distress  Lungs: Clear to auscultation bilaterally, no crackles or wheezing  Cardiovascular: Regular rate and rhythm, normal S1 and S2, and no murmur noted  Abdomen: Normal bowel sounds, soft, non-distended, non-tender  Skin: No rashes, no cyanosis, no edema  Other:    Medications     HEParin 1,100 Units/hr (05/21/18 0644)     - MEDICATION INSTRUCTIONS -       - MEDICATION INSTRUCTIONS -       Warfarin Therapy Reminder         cefTRIAXone  2 g Intravenous Q24H     ezetimibe  10 mg Oral Daily     furosemide  20 mg Oral Daily     mesalamine  800 mg Oral BID     metoprolol tartrate  25 mg Oral BID     rosuvastatin  40 mg Oral Daily     sodium chloride  (PF)  3 mL Intracatheter Q8H     tamsulosin  0.4 mg Oral Daily     warfarin  7.5 mg Oral ONCE at 18:00       Data   All microbiology laboratory data reviewed.  Recent Labs   Lab Test  05/21/18   0530  05/20/18   0540  05/18/18   0550   WBC  6.1  8.8  22.1*   HGB  11.6*  11.4*  11.8*   HCT  35.2*  33.7*  35.6*   MCV  90  90  90   PLT  214  177  181     Recent Labs   Lab Test  05/21/18   0530  05/20/18   0540  05/19/18   0530   CR  0.88  0.90  0.98     No lab results found.  Recent Labs   Lab Test  05/20/18   0540  05/19/18   0530  05/18/18   0200  11/28/13   2201  11/28/13   1650  11/28/13   1630  10/16/13   0557   CULT  No growth after 18 hours  No growth after 2 days  No growth after 3 days  Duplicate request Charge credited  Duplicate request Charge credited  No growth  No growth  No MRSA isolated: susceptibilities not available. PCR assay is more sensitive  than culture.

## 2018-05-21 NOTE — PLAN OF CARE
Problem: Sepsis/Septic Shock (Adult)  Goal: Signs and Symptoms of Listed Potential Problems Will be Absent, Minimized or Managed (Sepsis/Septic Shock)  Signs and symptoms of listed potential problems will be absent, minimized or managed by discharge/transition of care (reference Sepsis/Septic Shock (Adult) CPG).   Outcome: Improving  Pt. Doing well after RACHAEL, VSS, continue heparin, no new concerns.     IV intact, hep running at 11, recheck tmrw, rocephin given this am at 10am, scheduled again for tmrw.

## 2018-05-21 NOTE — PLAN OF CARE
Problem: Cardiac Disease Comorbidity  Goal: Cardiac Disease  Patient comorbidity will be monitored for signs and symptoms of Cardiac Disease.  Problems will be absent, minimized or managed by discharge/transition of care.   Outcome: Improving  Pt. Being prepped for anthony at 1040 am, po meds given, abx started.  vss stable skin intact, no q's or concerns.

## 2018-05-21 NOTE — PLAN OF CARE
Problem: Patient Care Overview  Goal: Plan of Care/Patient Progress Review  Outcome: Improving  Pt VSS on RA. Tele SR w/ 1 degree AVB, BBB and PVC's. A&Ox4. Contact precautions maintained. Up ad claudette, using bathroom. Steri strips intact on ear. C/o shoulder pain, given prn tylenol with decrease in pain. Slept. Plan to continue heparin bridging until INR therapeutic, plan for RACHAEL today to look for vegetation on valves. Continue to monitor.

## 2018-05-21 NOTE — PROGRESS NOTES
Patient is alert and oriented and aware of upcoming procedure. Patient states he has had RACHAEL before and has no questions. Pt denies difficulty swallowing or sleep apnea. No dentures or loose teeth. NPO since midnight. MD arrived to explain procedure to patient and wife, all questions answered, consent obtained. MD Sedation Assessment completed. Time Out done.    RACHAEL: Pt tolerated well. VSS, see RACHAEL Flowsheet. Total sedation given - 4 mg Versed & 100 mcg Fentanyl. Dr Amaro spoke with pt & wife post procedure. Report given to primary RN. Resp even & unlabored & vital signs WNL upon transfer of care.

## 2018-05-21 NOTE — PROCEDURES
RACHAEL Note    Indication: bacteremia    Informed consent was signed by the patient.  A timeout was taken.    Sedation:  Versed 4mg  Fentanyl 100mcg    Findings:  LV: EF 55%  RV: normal  LA: no LA appendage thrombus  Mitral valve: mechanical MV, mean 4-5mmHg, both leaflet open well, physiologic MR  Aortic valve: limited views due to MV valve shadowing, but probably no endocarditis, no AI, mean 11mmHg  Tricuspid valve: mild-mod TR  Atrial septum: negative bubble study  Aorta: mild plaque    No evidence of endocarditis.    No complications.    Humphrey Amaro MD  Cardiology - [facility]  Pager: [phone]  Text Page  May 21, 2018

## 2018-05-21 NOTE — PROGRESS NOTES
[facility]    Hospitalist Progress Note    Assessment & Plan   Fausto Farr is a 78 year old male with PMH of CAD, s/p CABG, s/p aortic and mitral mechanical valve replacement, Afib, on Coumadin, AAA, ulcerative colitis, and prostate cancer- admitted for evaluation of fever/chills/weakness; presented initially to UC, referred to ER.    Sepsis due to Group  B strep bacteremia   Cellulitis left lower extremity (noted on day 2 after admission)   - BP on admission was soft. WBCs 22.1, fever 101.5, la 2.1  - started initially on Zosyn/Vanco  - initially the cause of sepsis was not clear but on day 2 after admission noted to have LLE erythema and warmth- suggestive of cellulitis; he also has some scabs on his LLE- due to gardening  - US LLE- no DVT  - BC from outside - positive for Group B strep  - UA negative, CXR- no infiltrates;   - ID following  - repeat daily BCs -negative;   - WBCs normalized 8.8--6, afebrile now, BP improved  - changed to Ceftriaxone on 5/20  - Echo on 5/18- EF- 55-60%, no evidence of vegetations  - plan for RACHAEL today  - further ABX as per ID, pending RACHAEL results- will need 2 weeks vs longer period of iv ABX    ADDENDUM: RACHAEL with no vegetations, discussed with ID, will order midline; likely he will go home with iv Ceftriaxone for 2 weeks; follow final ID recommendations     Coronary artery disease, s/p CABG  HTN  HLP   - denies chest pain  - EKG no new changes, troponin x2  negative.    - continue PTA Toprol XL 25 mg po BID, Zetia, Crestor  - had a run of Vtach 18 beats- asymptomatic  - Mg 1.9  - K replacement protocol  - Tele    History of congestive heart failure:   - Last echo with intact EF; repeat TTE- no new changes   - resumed PTA Lasix 20 mg po daily    s/p Mechanical valve, aortic and mitral:  - INR goal 2.5-3.5  - Coumadin as per pharm dosing  - INR was subtherapeutic on 5/20- so he was started on heparin drip for bridging; INR today 5/21- 2.26- still  subtherapeutic- continue heparin drip until INR>2.5     Benign prostatic hypertrophy:  - continue PTA tamsulosin     Ulcerative colitis:    - Continue PTA mesalamine.     DVT/px- on Coumadin and heparin drip     Code Status: Full Code     Disposition: possible d/c tomorrow, pending RACHAEL result- will need midline vs PICC    Aaliyah Payne MD      Interval History   Doing better, denies chest pain, no SOB, no N/V, no abd pain, had diarrhea yesterday because his UC, had a soft BM today; discussed with bedside RN and ID    -Data reviewed today: I reviewed all new labs and imaging results over the last 24 hours.    Physical Exam   Temp: 97.6  F (36.4  C) Temp src: Oral BP: 154/89   Heart Rate: 71 Resp: 18 SpO2: 97 % O2 Device: None (Room air)    Vitals:    05/19/18 0143 05/20/18 0807 05/21/18 0700   Weight: 104.9 kg (231 lb 4.8 oz) 101.2 kg (223 lb 3.2 oz) 100.3 kg (221 lb 1.6 oz)     Vital Signs with Ranges  Temp:  [97.3  F (36.3  C)-97.8  F (36.6  C)] 97.6  F (36.4  C)  Heart Rate:  [66-71] 71  Resp:  [16-18] 18  BP: (115-154)/(66-89) 154/89  SpO2:  [97 %-98 %] 97 %  I/O last 3 completed shifts:  In: 303 [P.O.:300; I.V.:3]  Out: -     Constitutional: Awake, alert, cooperative, no apparent distress,obese   Respiratory: Clear to auscultation bilaterally, no crackles or wheezing  Cardiovascular: irregularly irregular, no murmurs, mechanical valve click  GI: Normal bowel sounds, soft, non-distended, non-tender  Extremities: LLE- with mild swelling, erythema and warmth; RLE- no edema   Neuro: moving all 4 extremities, no focal deficit noted     Medications     HEParin 1,100 Units/hr (05/21/18 0644)     - MEDICATION INSTRUCTIONS -       - MEDICATION INSTRUCTIONS -       Warfarin Therapy Reminder         cefTRIAXone  2 g Intravenous Q24H     ezetimibe  10 mg Oral Daily     furosemide  20 mg Oral Daily     mesalamine  800 mg Oral BID     metoprolol tartrate  25 mg Oral BID     rosuvastatin  40 mg Oral Daily     sodium  chloride (PF)  3 mL Intracatheter Q8H     tamsulosin  0.4 mg Oral Daily     warfarin  7.5 mg Oral ONCE at 18:00       Data     Recent Labs  Lab 05/21/18  0530 05/20/18  0540 05/19/18  0530 05/18/18  0550  05/18/18  0200   WBC 6.1 8.8  --  22.1*  --   --    HGB 11.6* 11.4*  --  11.8*  --   --    MCV 90 90  --  90  --   --     177  --  181  --   --    INR 2.26* 1.83* 2.11* 3.71*  < >  --     143  --  141  --   --    POTASSIUM 3.6 3.4  --  4.4  --   --    CHLORIDE 107 110*  --  109  --   --    CO2 28 28  --  24  --   --    BUN 14 14  --  19  --   --    CR 0.88 0.90 0.98 1.13  < >  --    ANIONGAP 8 5  --  8  --   --    AWILDA 8.7 8.3*  --  7.8*  --   --    * 109*  --  119*  --   --    TROPI  --   --   --  0.029  --  0.042   < > = values in this interval not displayed.    Recent Labs  Lab 05/21/18  0530 05/20/18  0540 05/19/18  0142 05/18/18  0550   * 109*  --  119*   BGM  --   --  94  --        Imaging:   No results found for this or any previous visit (from the past 24 hour(s)).

## 2018-05-22 ENCOUNTER — HOME INFUSION (PRE-WILLOW HOME INFUSION) (OUTPATIENT)
Dept: PHARMACY | Facility: CLINIC | Age: 78
End: 2018-05-22

## 2018-05-22 VITALS
BODY MASS INDEX: 35.5 KG/M2 | DIASTOLIC BLOOD PRESSURE: 101 MMHG | HEIGHT: 66 IN | TEMPERATURE: 97.8 F | HEART RATE: 64 BPM | WEIGHT: 220.9 LBS | SYSTOLIC BLOOD PRESSURE: 165 MMHG | OXYGEN SATURATION: 96 % | RESPIRATION RATE: 18 BRPM

## 2018-05-22 LAB
ANION GAP SERPL CALCULATED.3IONS-SCNC: 5 MMOL/L (ref 3–14)
BUN SERPL-MCNC: 13 MG/DL (ref 7–30)
CALCIUM SERPL-MCNC: 8.7 MG/DL (ref 8.5–10.1)
CHLORIDE SERPL-SCNC: 108 MMOL/L (ref 94–109)
CO2 SERPL-SCNC: 29 MMOL/L (ref 20–32)
CREAT SERPL-MCNC: 0.8 MG/DL (ref 0.66–1.25)
GFR SERPL CREATININE-BSD FRML MDRD: >90 ML/MIN/1.7M2
GLUCOSE SERPL-MCNC: 117 MG/DL (ref 70–99)
INR PPP: 2.53 (ref 0.86–1.14)
LMWH PPP CHRO-ACNC: 0.13 IU/ML
POTASSIUM SERPL-SCNC: 3.6 MMOL/L (ref 3.4–5.3)
SODIUM SERPL-SCNC: 142 MMOL/L (ref 133–144)

## 2018-05-22 PROCEDURE — 27211289 ZZ H KIT CATH IV 4FR 8 CM OR 10 CM, POWERWAND QUICK XL

## 2018-05-22 PROCEDURE — 25000132 ZZH RX MED GY IP 250 OP 250 PS 637: Mod: GY | Performed by: HOSPITALIST

## 2018-05-22 PROCEDURE — 85610 PROTHROMBIN TIME: CPT | Performed by: HOSPITALIST

## 2018-05-22 PROCEDURE — 99239 HOSP IP/OBS DSCHRG MGMT >30: CPT | Performed by: INTERNAL MEDICINE

## 2018-05-22 PROCEDURE — 85520 HEPARIN ASSAY: CPT | Performed by: HOSPITALIST

## 2018-05-22 PROCEDURE — 36569 INSJ PICC 5 YR+ W/O IMAGING: CPT

## 2018-05-22 PROCEDURE — 87040 BLOOD CULTURE FOR BACTERIA: CPT | Performed by: INTERNAL MEDICINE

## 2018-05-22 PROCEDURE — 25000128 H RX IP 250 OP 636: Performed by: INTERNAL MEDICINE

## 2018-05-22 PROCEDURE — 25000132 ZZH RX MED GY IP 250 OP 250 PS 637: Mod: GY | Performed by: INTERNAL MEDICINE

## 2018-05-22 PROCEDURE — A9270 NON-COVERED ITEM OR SERVICE: HCPCS | Mod: GY | Performed by: INTERNAL MEDICINE

## 2018-05-22 PROCEDURE — 36415 COLL VENOUS BLD VENIPUNCTURE: CPT | Performed by: HOSPITALIST

## 2018-05-22 PROCEDURE — A9270 NON-COVERED ITEM OR SERVICE: HCPCS | Mod: GY | Performed by: HOSPITALIST

## 2018-05-22 PROCEDURE — 80048 BASIC METABOLIC PNL TOTAL CA: CPT | Performed by: HOSPITALIST

## 2018-05-22 RX ORDER — CEFTRIAXONE SODIUM 2 G
2 VIAL (EA) INJECTION ONCE
Status: CANCELLED | OUTPATIENT
Start: 2018-05-23 | End: 2018-05-23

## 2018-05-22 RX ORDER — WARFARIN SODIUM 7.5 MG/1
7.5 TABLET ORAL
Status: DISCONTINUED | OUTPATIENT
Start: 2018-05-22 | End: 2018-05-22 | Stop reason: HOSPADM

## 2018-05-22 RX ADMIN — EZETIMIBE 10 MG: 10 TABLET ORAL at 10:28

## 2018-05-22 RX ADMIN — CEFTRIAXONE 2 G: 2 INJECTION, POWDER, FOR SOLUTION INTRAMUSCULAR; INTRAVENOUS at 10:29

## 2018-05-22 RX ADMIN — ROSUVASTATIN CALCIUM 40 MG: 20 TABLET, FILM COATED ORAL at 10:27

## 2018-05-22 RX ADMIN — FUROSEMIDE 20 MG: 20 TABLET ORAL at 10:27

## 2018-05-22 RX ADMIN — METOPROLOL TARTRATE 25 MG: 25 TABLET ORAL at 10:26

## 2018-05-22 RX ADMIN — ACETAMINOPHEN 650 MG: 325 TABLET ORAL at 10:25

## 2018-05-22 RX ADMIN — MESALAMINE 800 MG: 800 TABLET, DELAYED RELEASE ORAL at 10:26

## 2018-05-22 RX ADMIN — TAMSULOSIN HYDROCHLORIDE 0.4 MG: 0.4 CAPSULE ORAL at 10:27

## 2018-05-22 RX ADMIN — ACETAMINOPHEN 650 MG: 325 TABLET ORAL at 02:05

## 2018-05-22 ASSESSMENT — PAIN DESCRIPTION - DESCRIPTORS: DESCRIPTORS: ACHING

## 2018-05-22 NOTE — PLAN OF CARE
Problem: Patient Care Overview  Goal: Plan of Care/Patient Progress Review  Outcome: Adequate for Discharge Date Met: 05/22/18  D/c home with wife, no pain up in the room independent.

## 2018-05-22 NOTE — PROGRESS NOTES
[facility]    Hospitalist Progress Note    Assessment & Plan   Fausto Farr is a 78 year old male with PMH of CAD, s/p CABG, s/p aortic and mitral mechanical valve replacement, Afib, on Coumadin, AAA, ulcerative colitis, and prostate cancer who was admitted for evaluation of fever/chills/weakness.    Sepsis due to Group  B strep bacteremia,  Left lower extremity cellulitis  - BP on admission was soft. WBCs 22.1, fever 101.5, la 2.1  - Started initially on Zosyn/Vanco  - initially the cause of sepsis was not clear but on hospital day 2 noted to have LLE erythema and warmth- suggestive of cellulitis; he also has some scabs on his LLE- due to gardening  - US LLE- no DVT  - UA negative, CXR with no infiltrates  - BC from outside UC- positive for Group B strep  - Repeat daily BCs negative;   - WBCs normalized 8.8--6, afebrile now, BP improved  - Echo on 5/18- EF- 55-60%, no evidence of vegetations  - RACHAEL 05/21 showed mechanical aortic and mechanical valves with no vegetations. LVEF 55%  - Changed to Ceftriaxone on 5/20 to continue for 2 weeks    Coronary artery disease, s/p CABG x2 in 2006  HFpEF (chronic)- LVEF 55-60%  HTN  HLP   - Denies chest pain  - EKG no new changes, troponin x2  negative.    - Continue PTA Toprol XL 25 mg po BID, furosemide 20 mg daily, Zetia, Crestor  - Had a run of Vtach 18 beats on 05/20- asymptomatic  - Cont telemetry    s/p Mechanical valve, aortic and mitral:  - INR goal 2.5-3.5  - Coumadin as per pharm dosing  - D/C IV heparin bridge     Benign prostatic hypertrophy:  - Continue PTA tamsulosin     Ulcerative colitis:    - Continue PTA mesalamine.     DVT/px- on Coumadin and heparin drip     Code Status: Full Code     Disposition: Anticipate d/c today.    Eden Weston MD      Interval History   Feels well and denies chest pain, SOB, N/V, or abd pain. No recurrence of v tach. He reports loose BMs.    -Data reviewed today: I reviewed all new labs and imaging  results over the last 24 hours.    Physical Exam   Temp: 98.4  F (36.9  C) Temp src: Oral BP: 153/84 Pulse: 56 Heart Rate: 72 Resp: 18 SpO2: 96 % O2 Device: None (Room air)    Vitals:    05/20/18 0807 05/21/18 0700 05/22/18 0536   Weight: 101.2 kg (223 lb 3.2 oz) 100.3 kg (221 lb 1.6 oz) 100.2 kg (220 lb 14.4 oz)     Vital Signs with Ranges  Temp:  [97.3  F (36.3  C)-98.4  F (36.9  C)] 98.4  F (36.9  C)  Pulse:  [54-77] 56  Heart Rate:  [72-88] 72  Resp:  [18] 18  BP: ()/() 153/84  SpO2:  [93 %-97 %] 96 %  I/O last 3 completed shifts:  In: 904.19 [P.O.:480; I.V.:424.19]  Out: -     Constitutional: Awake, alert, cooperative, no apparent distress,obese   Respiratory: Clear to auscultation bilaterally, no crackles or wheezing  Cardiovascular: irregularly irregular, no murmurs, mechanical valve click  GI: Normal bowel sounds, soft, non-distended, non-tender  Extremities: No significant erythema or edema  Neuro: moving all 4 extremities, no focal deficit noted     Medications     HEParin 1,100 Units/hr (05/21/18 1252)     - MEDICATION INSTRUCTIONS -       - MEDICATION INSTRUCTIONS -       sodium chloride       Warfarin Therapy Reminder         cefTRIAXone  2 g Intravenous Q24H     ezetimibe  10 mg Oral Daily     furosemide  20 mg Oral Daily     mesalamine  800 mg Oral BID     metoprolol tartrate  25 mg Oral BID     rosuvastatin  40 mg Oral Daily     sodium chloride (PF)  3 mL Intracatheter Q8H     tamsulosin  0.4 mg Oral Daily       Data     Recent Labs  Lab 05/22/18  0535 05/21/18  0530 05/20/18  0540  05/18/18  0550  05/18/18  0200   WBC  --  6.1 8.8  --  22.1*  --   --    HGB  --  11.6* 11.4*  --  11.8*  --   --    MCV  --  90 90  --  90  --   --    PLT  --  214 177  --  181  --   --    INR 2.53* 2.26* 1.83*  < > 3.71*  --   --     143 143  --  141  < >  --    POTASSIUM 3.6 3.6 3.4  --  4.4  < >  --    CHLORIDE 108 107 110*  --  109  < >  --    CO2 29 28 28  --  24  < >  --    BUN 13 14 14  --  19  <  >  --    CR 0.80 0.88 0.90  < > 1.13  --   --    ANIONGAP 5 8 5  --  8  < >  --    AWILDA 8.7 8.7 8.3*  --  7.8*  < >  --    * 107* 109*  --  119*  < >  --    TROPI  --   --   --   --  0.029  --  0.042   < > = values in this interval not displayed.    Recent Labs  Lab 05/22/18  0535 05/21/18  0530 05/20/18  0540 05/19/18  0142 05/18/18  0550   * 107* 109*  --  119*   BGM  --   --   --  94  --        Imaging:   No results found for this or any previous visit (from the past 24 hour(s)).

## 2018-05-22 NOTE — PROGRESS NOTES
Therapy: IV ABX  Insurance: Medicare and Senior Gold BC (Pt has no coverage for IV ABX in the home, but would in Infusion suite or short term TCU)  Ded: $  Met: $    Co-Insurance:   Max Out of Pocket: $  Met: $    Please contact Intake with any questions, [phone] or In Basket pool, [facility] ([id number]).  In reference to admission date 5/17/18 to check IV ABX coverage

## 2018-05-22 NOTE — DISCHARGE SUMMARY
[facility]  Discharge Summary        Fausto Farr MRN# [id number]   YOB: 1940 Age: 78 year old     Date of Admission:  5/17/2018  Date of Discharge:  5/22/2018  Admitting Physician:  Andres Morris MD  Discharge Physician:             Eden Weston MD  Discharging Service:             Hospitalist     Primary Provider: Tu Coker  Primary Care Physician Phone Number: [phone]     Discharge Diagnoses:     Sepsis due to Group B streptococcus  Left lower extremity cellulitis    Other/Chronic Medical Problems:      CAD, s/p CABG in 2006  Mechanical aortic and mitral valve, on warfarin  Paroxysmal atrial fibrillation  HFpEF  HTN  DLP  AAA, s/p endovascular repair  Ulcerative colitis  Prostate cancer  BPH  GAGE, has declined CPAP    Problem Oriented Hospital Course   Fausto Farr is a 78 year old male with PMH notable for CAD, s/p CABG, aortic and mitral mechanical valve replacement, PAF, AAA, ulcerative colitis, and prostate cancer who presented initially to an Urgent Care Clinic with fever/chills/weakness. He was admitted for further management.    For a detailed history and physical exam, please refer to the dictated admission note by Dr. Andres Morris on 05/18/2018.     Sepsis due to Group B strep,  Left lower extremity cellulitis  - BP on admission was soft. WBCs 22.1, fever 101.5, la 2.1  - Started on Zosyn/Vanco upon admission  - Initially the cause of sepsis was not clear but on hospital day 2 noted to have LLE erythema and warmth- suggestive of cellulitis; he also had some scabs on his LLE- due to gardening  - US LLE- no DVT  - UA negative, CXR with no infiltrates  - BC from Urgent Care positive for Group B strep  - Repeat daily BCs negative  - Transthoracic echo on 5/18- LVEF- 55-60%, no evidence of vegetations  - RACHAEL 05/21- mechanical aortic and mechanical valves with no vegetations. LVEF 55%.  - Changed the antibiotic regimen to Ceftriaxone on  5/20 to continue for 10 more days.     Coronary artery disease, s/p CABG x2 in 2006,  HFpEF (chronic)- LVEF 55-60%,  PAF,  HTN,  HLP.  - EKG showed atrial fib with PVCs. Troponin x2  negative.    - Continued PTA Toprol XL 25 mg po BID, furosemide 20 mg daily, Zetia, Crestor  - Had a run of Vtach 18 beats on 05/20- asymptomatic     S/P mechanical valve, aortic and mitral:  - Chronically on warfarin. INR goal 2.5-3.5  - Required IV heparin bridge this stay due to subtherapeutic INR      Benign prostatic hypertrophy:  - Continued PTA tamsulosin      Ulcerative colitis:    - Continued PTA mesalamine.         Code Status:      Full Code        Important Results:      Na 142, K 3.6, Cr 0.8, Hgb 11.6, WBC 6.1, PLT 214K        Pending Results:        Unresulted Labs Ordered in the Past 30 Days of this Admission     Date and Time Order Name Status Description    5/22/2018 0000 Blood culture Preliminary     5/21/2018 0000 Blood culture Preliminary     5/20/2018 0000 Blood culture Preliminary     5/19/2018 0000 Blood culture Preliminary     5/18/2018 0120 Blood culture Preliminary               Discharge Instructions and Follow-Up:      Follow-up Appointments     Follow-up and recommended labs and tests        Follow up with PCP in one week.  INR on 05/24.                        Discharge Disposition:      Discharged to home        Discharge Medications:        Current Discharge Medication List      START taking these medications    Details   cefTRIAXone (ROCEPHIN) 1 GM vial Inject 2 g (2,000 mg) into the vein daily for 10 days CBC with differential, creatinine, SGOT weekly while on this medication to be faxed to Dr. Leija office.  Qty: 600 mL, Refills: 0    Associated Diagnoses: Sepsis due to group B Streptococcus (H)         CONTINUE these medications which have NOT CHANGED    Details   betamethasone valerate (VALISONE) 0.1 % lotion APPLY TOPICALLY TWICE DAILY PRN  Qty: 60 mL, Refills: 3    Associated Diagnoses: Eczema,  unspecified type      ezetimibe (ZETIA) 10 MG tablet Take 1 tablet (10 mg) by mouth daily  Qty: 90 tablet, Refills: 3    Associated Diagnoses: Mixed hyperlipidemia      fluocinonide (LIDEX) 0.05 % ointment 2- 30 gram tubes.  Apply twice a day as needed.  Qty: 60 g, Refills: 2    Associated Diagnoses: Eczema, unspecified type      furosemide (LASIX) 40 MG tablet Take 0.5 tablets (20 mg) by mouth daily  Qty: 45 tablet, Refills: 3    Associated Diagnoses: Chronic diastolic congestive heart failure (H)      mesalamine (ASACOL HD) 800 MG EC tablet Take 1 tablet (800 mg) by mouth 2 times daily  Qty: 180 tablet, Refills: 3    Associated Diagnoses: Ulcerative proctitis without complication (H)      metoprolol tartrate (LOPRESSOR) 25 MG tablet Take 1 tablet (25 mg) by mouth 2 times daily  Qty: 180 tablet, Refills: 3    Associated Diagnoses: Coronary artery disease involving coronary bypass graft 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      rosuvastatin (CRESTOR) 40 MG tablet Take 1 tablet (40 mg) by mouth daily  Qty: 90 tablet, Refills: 3    Associated Diagnoses: Hyperlipidemia, unspecified hyperlipidemia type      tamsulosin (FLOMAX) 0.4 MG capsule Take 1 capsule (0.4 mg) by mouth daily  Qty: 90 capsule, Refills: 3    Associated Diagnoses: Urinary retention      warfarin (COUMADIN) 5 MG tablet Take 1 1/2 tablets (7.5 mg) by mouth TWTFSS; 1 tablet (5 mg) on Mondays OR as directed by INR Clinic  Qty: 135 tablet, Refills: 3    Associated Diagnoses: Long-term (current) use of anticoagulants; S/P aortic valve replacement      ASPIRIN NOT PRESCRIBED (INTENTIONAL) Please choose reason not prescribed, below  Qty: 0 each, Refills: 0    Associated Diagnoses: Long-term (current) use of anticoagulants; S/P aortic valve replacement                 Allergies:         Allergies   Allergen Reactions     Bees Anaphylaxis           Consultations This Hospital Stay:      Infectious disease        Condition and Physical on Discharge:     "  Discharge condition: Stable   Vitals: Blood pressure 153/84, pulse 56, temperature 98.4  F (36.9  C), temperature source Oral, resp. rate 18, height 1.664 m (5' 5.5\"), weight 100.2 kg (220 lb 14.4 oz), SpO2 96 %.           Diet and Activity:     After Care Instructions     Activity       Your activity upon discharge: activity as tolerated            Diet       Follow this diet upon discharge: Orders Placed This Encounter  Low salt, low saturated fat/cholesterol diet                            Discharge Time:      Greater than 30 minutes.        Image Results From This Hospital Stay (For Non-EPIC Providers):        Results for orders placed or performed during the hospital encounter of 05/17/18   Chest XR,  PA & LAT    Narrative    CHEST TWO VIEWS    5/17/2018 10:48 PM     HISTORY: Fever, shortness of breath.      COMPARISON: 9/14/2015 chest x-ray.      Impression    IMPRESSION: Previous valve surgery. Heart size is normal. Pulmonary  vasculature is normal. Small band of atelectasis in the right midlung  zone. No pleural fluid.    DIPAK KUHN MD   US Lower Extremity Venous Duplex Left    Narrative    VENOUS ULTRASOUND LEFT LOWER EXTREMITY  5/19/2018 3:37 PM     HISTORY: Rule out DVT.    COMPARISON: None.    TECHNIQUE: Color Doppler and spectral waveform analysis performed  throughout the deep veins of the left lower extremity.    FINDINGS: The left common femoral, proximal greater saphenous,  femoral, and popliteal veins demonstrate normal blood flow,  compression, and augmentation. Posterior tibial and peroneal veins are  compressible. Right common femoral vein also patent.      Impression    IMPRESSION: Negative for deep venous thrombosis in the left lower  extremity.    HARJIT STANLEY MD     Recent Results (from the past 4320 hour(s))   Echocardiogram Complete    Narrative    [id number]  [facility]  [id number]  [id number]^YANNICK^MARY BETH^HEMANT           [facility]  Echocardiography Laboratory  [address] [place] [address]        Name: LIANG VAUGHN  MRN: [id number]  : 1940  Study Date: 2018 01:59 PM  Age: 78 yrs  Gender: Male  Patient Location: [facility]  Reason For Study: Endocarditis  Ordering Physician: MARY BETH LANIER  Referring Physician: deyvi Reyes  Performed By: Nalini Lewis     BSA: 2.2 m2  Height: 72 in  Weight: 218 lb  HR: 66  BP: 86/54 mmHg  _____________________________________________________________________________  __        Procedure  Complete Portable Echo Adult. Contrast Optison.  _____________________________________________________________________________  __        Interpretation Summary     Technically difficult, suboptimal study. The left ventricle is normal in size.  Proximal septal thickening is noted. Left ventricular systolic function is  normal. The visual ejection fraction is estimated at 55-60%. Septal motion is  consistent with post-operative state. There is no thrombus seen in the left  ventricle.  The right ventricle is mildly dilated. Mildly decreased right ventricular  systolic function.  There is a mechanical mitral valve. (27 mm St Solo per operative report).  There is trace mitral regurgitation. The mean mitral valve gradient is 2.4  mmHg. The peak mitral valve gradient is 6.3 mmHg (recorded at a heart rate of  66 bpm). Normal prosthetic mitral valve gradients.  There is a mechanical aortic valve. (21 mm St Solo [place] Valve per operative  report). There is trace aortic regurgitation. The peak AoV pressure gradient  is 5.0 mmHg. The mean AoV pressure gradient is 2 mmHg. The gradient is normal  for this prosthetic aortic valve.  No pericardial effusion.  No valvular vegetations are noted, but this study is not diagnostic for  endocarditis (given acoustic shadowing from mechanical valves) and a RACHAEL  should be ordered if the clinical suspicion for endocarditis is high.  _____________________________________________________________________________  __         Left Ventricle  The left ventricle is normal in size. Proximal septal thickening is noted.  Left ventricular systolic function is normal. The visual ejection fraction is  estimated at 55-60%. Left ventricular diastolic function is indeterminate.  Septal motion is consistent with post-operative state. There is no thrombus  seen in the left ventricle.     Right Ventricle  The right ventricle is mildly dilated. Mildly decreased right ventricular  systolic function.     Atria  The left atrium is mildly dilated. Right atrial size is normal. There is no  color Doppler evidence of an atrial shunt.     Mitral Valve  There is a mechanical mitral valve. (27 mm St Solo per operative report).  There is trace mitral regurgitation. The mean mitral valve gradient is 2.4  mmHg. The peak mitral valve gradient is 6.3 mmHg (recorded at a heart rate of  66 bpm). Normal prosthetic mitral valve gradients.        Tricuspid Valve  There is trace tricuspid regurgitation.     Aortic Valve  There is a mechanical aortic valve. (21 mm St Solo [place] Valve per operative  report). There is trace aortic regurgitation. The peak AoV pressure gradient  is 5.0 mmHg. The mean AoV pressure gradient is 2 mmHg. The gradient is normal  for this prosthetic aortic valve.     Pulmonic Valve  There is no pulmonic valvular stenosis.     Vessels  Normal size ascending aorta. Dilated inferior vena cava.     Pericardium  There is no pericardial effusion.        Rhythm  Sinus rhythm was noted.  _____________________________________________________________________________  __  MMode/2D Measurements & Calculations  IVSd: 1.3 cm     LVIDd: 4.2 cm  LVIDs: 3.1 cm  LVPWd: 1.1 cm  FS: 27.0 %  LV mass(C)d: 185.4 grams  LV mass(C)dI: 83.9 grams/m2  Ao root diam: 2.8 cm  LA dimension: 5.2 cm  asc Aorta Diam: 3.0 cm  LA/Ao: 1.8  LVOT diam: 1.7 cm  LVOT area: 2.1 cm2  LA Volume (BP): 82.7 ml  LA Volume Index (BP): 37.4 ml/m2  RWT: 0.53           Doppler Measurements &  Calculations  MV E max lev: 144.0 cm/sec  MV A max lev: 91.8 cm/sec  MV E/A: 1.6  MV max P.3 mmHg  MV mean P.4 mmHg  MV V2 VTI: 31.4 cm  MVA(VTI): 1.1 cm2  MV P1/2t max lev: 144.3 cm/sec  MV P1/2t: 91.2 msec  MVA(P1/2t): 2.4 cm2  MV dec slope: 463.1 cm/sec2  MV dec time: 0.29 sec  Ao V2 max: 107.7 cm/sec  Ao max P.0 mmHg  Ao V2 mean: 62.5 cm/sec  Ao mean P.9 mmHg  Ao V2 VTI: 20.5 cm  CHARLEE(I,D): 1.8 cm2  CHARLEE(V,D): 1.7 cm2  LV V1 max P.8 mmHg  LV V1 max: 83.7 cm/sec  LV V1 VTI: 16.8 cm  SV(LVOT): 36.1 ml  SI(LVOT): 16.3 ml/m2  PA acc time: 0.12 sec  AV Lev Ratio (DI): 0.78  CHARLEE Index (cm2/m2): 0.80  E/E' av.1  Lateral E/e': 14.4  Medial E/e': 19.8              _____________________________________________________________________________  __        Report approved by: Olivia Garcia 2018 03:08 PM              Most Recent Lab Results In EPIC (For Non-EPIC Providers):    Most Recent 3 CBC's:  Recent Labs   Lab Test  18   0530  18   0540  18   0550   WBC  6.1  8.8  22.1*   HGB  11.6*  11.4*  11.8*   MCV  90  90  90   PLT  214  177  181      Most Recent 3 BMP's:  Recent Labs   Lab Test  18   0535  18   0530  18   0540   NA  142  143  143   POTASSIUM  3.6  3.6  3.4   CHLORIDE  108  107  110*   CO2  29  28  28   BUN  13  14  14   CR  0.80  0.88  0.90   ANIONGAP  5  8  5   AWILDA  8.7  8.7  8.3*   GLC  117*  107*  109*     Most Recent 3 Troponin's:  Recent Labs   Lab Test  18   0550  18   0200  13   0010   TROPI  0.029  0.042  0.033     Most Recent 3 INR's:  Recent Labs   Lab Test  18   0535  18   0530  18   0540   INR  2.53*  2.26*  1.83*     Most Recent 2 LFT's:  Recent Labs   Lab Test  18   1043  17   1244   AST  29  25   ALT  32  27   ALKPHOS  43  50   BILITOTAL  0.5  0.4     Most Recent Cholesterol Panel:  Recent Labs   Lab Test  18   1043   CHOL  119   LDL  52   HDL  32*   TRIG  176*     Most Recent 6  Bacteria Isolates From Any Culture (See EPIC Reports for Culture Details):  Recent Labs   Lab Test  05/22/18   0535  05/21/18   0530  05/20/18   0540  05/19/18   0530  05/18/18   0200  11/28/13   2201   CULT  No growth after 1 hour  No growth after 20 hours  No growth after 2 days  No growth after 3 days  No growth after 4 days  Duplicate request Charge credited  Duplicate request Charge credited     Most Recent TSH, T4 and HgbA1c:   Recent Labs   Lab Test  05/08/18   1043  04/25/17   1244   TSH  0.96   --    A1C   --   5.9

## 2018-05-22 NOTE — PROGRESS NOTES
[facility]    Infectious Disease Progress Note    Date of Service (when I saw the patient): 05/22/2018     Assessment & Plan   Fausto Farr is a 78 year old male who was admitted on 5/17/2018.     Impression:  1. 78 male with history of Aortic valve and mitral valve replacement.   2. Admitted with sudden onset fevers and chills.   3. Suspicion for endocarditis given  cardiac history .   4. Blood cultures positive for group B strep.   5. Multiple scabs on the LE, the ? Potential source.      Recommendations:   Will treat group B strep bacteremia with possible LE source and RACHAEL negative for endocarditis with IV ceftriaxone for 2 weeks.   Midline ok.   Orders for D/C antibiotics in the chart.           Cassandra Floyd MD    Interval History   Afebrile   Feels better   No more positive cultures   RACHAEL today     Physical Exam   Temp: 97.8  F (36.6  C) Temp src: Oral BP: 153/84 Pulse: 56 Heart Rate: 72 Resp: 18 SpO2: 96 % O2 Device: None (Room air)    Vitals:    05/20/18 0807 05/21/18 0700 05/22/18 0536   Weight: 101.2 kg (223 lb 3.2 oz) 100.3 kg (221 lb 1.6 oz) 100.2 kg (220 lb 14.4 oz)     Vital Signs with Ranges  Temp:  [97.3  F (36.3  C)-98.4  F (36.9  C)] 97.8  F (36.6  C)  Pulse:  [54-77] 56  Heart Rate:  [72-88] 72  Resp:  [18] 18  BP: ()/() 153/84  SpO2:  [93 %-97 %] 96 %    Constitutional: Awake, alert, cooperative, no apparent distress  Lungs: Clear to auscultation bilaterally, no crackles or wheezing  Cardiovascular: Regular rate and rhythm, normal S1 and S2, and no murmur noted  Abdomen: Normal bowel sounds, soft, non-distended, non-tender  Skin: No rashes, no cyanosis, no edema  Other:    Medications     - MEDICATION INSTRUCTIONS -       - MEDICATION INSTRUCTIONS -       sodium chloride       Warfarin Therapy Reminder         cefTRIAXone  2 g Intravenous Q24H     ezetimibe  10 mg Oral Daily     furosemide  20 mg Oral Daily     mesalamine  800 mg Oral BID     metoprolol tartrate   25 mg Oral BID     rosuvastatin  40 mg Oral Daily     sodium chloride (PF)  10 mL Intracatheter Q8H     sodium chloride (PF)  3 mL Intracatheter Q8H     tamsulosin  0.4 mg Oral Daily     warfarin  7.5 mg Oral ONCE at 18:00       Data   All microbiology laboratory data reviewed.  Recent Labs   Lab Test  05/21/18   0530  05/20/18   0540  05/18/18   0550   WBC  6.1  8.8  22.1*   HGB  11.6*  11.4*  11.8*   HCT  35.2*  33.7*  35.6*   MCV  90  90  90   PLT  214  177  181     Recent Labs   Lab Test  05/22/18   0535  05/21/18   0530  05/20/18   0540   CR  0.80  0.88  0.90     No lab results found.  Recent Labs   Lab Test  05/22/18   0535  05/21/18   0530  05/20/18   0540  05/19/18   0530  05/18/18   0200  11/28/13   2201  11/28/13   1650  11/28/13   1630  10/16/13   0557   CULT  No growth after 1 hour  No growth after 20 hours  No growth after 2 days  No growth after 3 days  No growth after 4 days  Duplicate request Charge credited  Duplicate request Charge credited  No growth  No growth  No MRSA isolated: susceptibilities not available. PCR assay is more sensitive  than culture.

## 2018-05-22 NOTE — PLAN OF CARE
Problem: Patient Care Overview  Goal: Plan of Care/Patient Progress Review  Outcome: Improving  VSS overnight, BP slightly hypertensive. Afebrile. Pt A and O x4. Up independently in room. Diarrhea r/t colitis. Heparin gtt infusing, bridging with coumadin- awaiting therapeutic INR. BC collected this am. Plan per notes-d/c with midline on outpt antibx when INR therapeutic. Tele SD with 1 deg and BBB.

## 2018-05-22 NOTE — PLAN OF CARE
Problem: Patient Care Overview  Goal: Plan of Care/Patient Progress Review  Outcome: Improving  VSS. Denies CP, SOB. Up independently in room. Hep gtt continues @ 1100U/hr with 10a recheck in the AM. Continue to monitor.

## 2018-05-22 NOTE — PROGRESS NOTES
Patient requires a chair time for IV rocephin Q24H for 10 days.  Called [facility] and they have a chair time for tomorrow at 10:00 a.m. Patient will have remaining appointments given to him at his infusion visit.  Will give this update to GUCCI Zuluaga.

## 2018-05-23 ENCOUNTER — INFUSION THERAPY VISIT (OUTPATIENT)
Dept: INFUSION THERAPY | Facility: CLINIC | Age: 78
End: 2018-05-23
Attending: INTERNAL MEDICINE
Payer: MEDICARE

## 2018-05-23 ENCOUNTER — TELEPHONE (OUTPATIENT)
Dept: PEDIATRICS | Facility: CLINIC | Age: 78
End: 2018-05-23

## 2018-05-23 VITALS
DIASTOLIC BLOOD PRESSURE: 69 MMHG | HEART RATE: 67 BPM | SYSTOLIC BLOOD PRESSURE: 131 MMHG | OXYGEN SATURATION: 96 % | RESPIRATION RATE: 16 BRPM | TEMPERATURE: 97.6 F

## 2018-05-23 DIAGNOSIS — Z95.2 S/P MITRAL VALVE REPLACEMENT: ICD-10-CM

## 2018-05-23 DIAGNOSIS — A41.9 SEPSIS (H): ICD-10-CM

## 2018-05-23 DIAGNOSIS — A40.1 SEPSIS DUE TO GROUP B STREPTOCOCCUS (H): Primary | ICD-10-CM

## 2018-05-23 PROCEDURE — 27210995 ZZH RX 272: Performed by: INTERNAL MEDICINE

## 2018-05-23 PROCEDURE — 96374 THER/PROPH/DIAG INJ IV PUSH: CPT

## 2018-05-23 PROCEDURE — 25000128 H RX IP 250 OP 636: Performed by: INTERNAL MEDICINE

## 2018-05-23 RX ORDER — CEFTRIAXONE SODIUM 2 G
2 VIAL (EA) INJECTION ONCE
Status: COMPLETED | OUTPATIENT
Start: 2018-05-23 | End: 2018-05-23

## 2018-05-23 RX ORDER — CEFTRIAXONE SODIUM 2 G
2 VIAL (EA) INJECTION ONCE
Status: CANCELLED | OUTPATIENT
Start: 2018-05-23 | End: 2018-05-23

## 2018-05-23 RX ADMIN — WATER 2 G: 1 INJECTION INTRAMUSCULAR; INTRAVENOUS; SUBCUTANEOUS at 10:10

## 2018-05-23 NOTE — TELEPHONE ENCOUNTER
ED / Discharge Outreach Protocol    Patient Contact    Attempt # 1    Was call answered?  No.  Left message on voicemail with information to call me back.    June SERRANO RN, BSN, PHN  [place] [facility] RN

## 2018-05-23 NOTE — TELEPHONE ENCOUNTER
ED / Discharge Outreach Protocol    Patient Contact    Attempt # 2    Was call answered?  No.  Left message on voicemail with information to call me back.  Claudia Day RN  Message handled by Nurse Triage.

## 2018-05-23 NOTE — PROGRESS NOTES
Infusion Nursing Note:  Fausto Farr presents today for Rocephin.    Patient seen by provider today: No   present during visit today: Not Applicable.    Note: N/A.    Intravenous Access:  Midline.    Treatment Conditions:  Not Applicable.      Post Infusion Assessment:  Patient tolerated injection without incident.  Blood return noted pre and post infusion.  Site patent and intact, free from redness, edema or discomfort.  No evidence of extravasations.    Discharge Plan:   Discharge instructions reviewed with: Patient.   Patient will return 5/24 for next appointment.   Patient discharged in stable condition accompanied by: self.  Departure Mode: Ambulatory.    Lee Ann Enriquez RN

## 2018-05-23 NOTE — TELEPHONE ENCOUNTER
Please contact patient for In-patient follow up.  [phone] (home) none (work)    Visit date: 052218  Diagnosis listed: Sepsis Due To Group B Streptococcus (H), Severe Sepsis (H)  Number of visits in past 12 months: 0 ED / 1 IP

## 2018-05-23 NOTE — MR AVS SNAPSHOT
After Visit Summary   5/23/2018    Fausto Farr    MRN: [id number]           Patient Information     Date Of Birth          1940        Visit Information        Provider Department      5/23/2018 10:00 AM RH INFUSION CHAIR 3 [facility]        Today's Diagnoses     Sepsis due to group B Streptococcus (H)    -  1    Sepsis (H)        S/P mitral valve replacement           Follow-ups after your visit        Your next 10 appointments already scheduled     May 24, 2018  9:00 AM CDT   Level 1 with  INFUSION CHAIR 1   [facility] ([facility])    [facility]  [address]  [place] [address]   [phone]            May 25, 2018  8:30 AM CDT   Level 1 with  INFUSION CHAIR 11   [facility] ([facility])    [facility]  [address]  [facility] [address]   [phone]            May 25, 2018 11:45 AM CDT   Anticoagulation Visit with  ANTICOAGULATION CLINIC   [facility] ([facility])    [address] [place] [address] [phone]            May 26, 2018 10:00 AM CDT   Level 1 with  INFUSION CHAIR 2   [facility] ([facility])    [facility]  [address]  [facility] [address] [phone]            May 27, 2018 10:00 AM CDT   Level 1 with  INFUSION CHAIR 4   [facility] ([facility])    [facility]  [address]  [place] [address] [phone]            May 28, 2018 10:00 AM CDT   Level 1 with  INFUSION CHAIR 20   [facility] ([facility])    [facility]  [address]  [facility] [address]    [phone]            May 29, 2018  9:00 AM CDT   Level 1 with RH INFUSION CHAIR 8   [facility] ([facility])    [facility]  [address]  [place] [address]   [phone]            May 30, 2018  9:00 AM CDT   Level 1 with RH INFUSION CHAIR 12   [facility] ([facility])    [facility]  [address]  [facility] [address]   [phone]            May 31, 2018  9:00 AM CDT   Level 1 with RH INFUSION CHAIR 7   [facility] ([facility])    [facility]  [address]  [facility] [address]   [phone]            May 31, 2018  1:10 PM CDT   Office Visit with Tu Reyes MD   [facility] ([facility])    [address] [place] [address]  [address]  [facility] [id number] [phone]           Bring a current list of meds and any records pertaining to this visit. For Physicals, please bring immunization records and any forms needing to be filled out. Please arrive 10 minutes early to complete paperwork.              Who to contact     If you have questions or need follow up information about today's clinic visit or your schedule please contact [facility] directly at [phone].  Normal or non-critical lab and imaging results will be communicated to you by MyChart, letter or phone within 4 business days after the clinic has received the results. If you do not hear from us within 7 days, please contact the clinic through MyChart or phone. If you have a critical or abnormal lab result, we will notify you by phone as soon as possible.  Submit refill requests through [employer] or call your pharmacy and they will forward the refill request to us. Please allow 3 business days for your refill to be  "completed.          Additional Information About Your Visit        [employer] Information     [employer] lets you send messages to your doctor, view your test results, renew your prescriptions, schedule appointments and more. To sign up, go to [email or web address] . Click on \"Log in\" on the left side of the screen, which will take you to the Welcome page. Then click on \"Sign up Now\" on the right side of the page.     You will be asked to enter the access code listed below, as well as some personal information. Please follow the directions to create your username and password.     Your access code is: [email or web address]  Expires: 2018  8:30 AM     Your access code will  in 90 days. If you need help or a new code, please call your [place] clinic or [phone].        Care EveryWhere ID     This is your Care EveryWhere ID. This could be used by other organizations to access your [place] medical records  [id number]        Your Vitals Were     Pulse Temperature Respirations Pulse Oximetry          67 97.6  F (36.4  C) (Tympanic) 16 96%         Blood Pressure from Last 3 Encounters:   18 131/69   18 (!) 165/101   18 124/72    Weight from Last 3 Encounters:   18 100.2 kg (220 lb 14.4 oz)   18 102.5 kg (226 lb)   17 99.8 kg (220 lb)              Today, you had the following     No orders found for display       Primary Care Provider Office Phone # Fax #    Tu Reyes [phone] [address] [place] [address]        Equal Access to Services     [facility] : Hadnitin Wu, maria del rosario lopez, denny tucker. So [facility] [phone].    ATENCIÓN: Si habla español, tiene a brown disposición servicios gratuitos de asistencia lingüística. Llame al [phone].    We comply with applicable federal civil rights laws and [place] laws. We do not discriminate on the basis of race, " color, national origin, age, disability, sex, sexual orientation, or gender identity.            Thank you!     Thank you for choosing [facility]  for your care. Our goal is always to provide you with excellent care. Hearing back from our patients is one way we can continue to improve our services. Please take a few minutes to complete the written survey that you may receive in the mail after your visit with us. Thank you!             Your Updated Medication List - Protect others around you: Learn how to safely use, store and throw away your medicines at www.disposemymeds.org.          This list is accurate as of 5/23/18 10:55 AM.  Always use your most recent med list.                   Brand Name Dispense Instructions for use Diagnosis    ASPIRIN NOT PRESCRIBED    INTENTIONAL    0 each    Please choose reason not prescribed, below    Long-term (current) use of anticoagulants, S/P aortic valve replacement       betamethasone valerate 0.1 % lotion    VALISONE    60 mL    APPLY TOPICALLY TWICE DAILY PRN    Eczema, unspecified type       cefTRIAXone 1 GM vial    ROCEPHIN    600 mL    Inject 2 g (2,000 mg) into the vein daily for 10 days CBC with differential, creatinine, SGOT weekly while on this medication to be faxed to Dr. Leija office.    Sepsis due to group B Streptococcus (H)       ezetimibe 10 MG tablet    ZETIA    90 tablet    Take 1 tablet (10 mg) by mouth daily    Mixed hyperlipidemia       fluocinonide 0.05 % ointment    LIDEX    60 g    2- 30 gram tubes.  Apply twice a day as needed.    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)       metoprolol tartrate 25 MG tablet    LOPRESSOR    180  tablet    Take 1 tablet (25 mg) by mouth 2 times daily    Coronary artery disease involving coronary bypass graft 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       rosuvastatin 40 MG tablet    CRESTOR    90 tablet    Take 1 tablet (40 mg) by mouth daily    Hyperlipidemia, unspecified hyperlipidemia type       tamsulosin 0.4 MG capsule    FLOMAX    90 capsule    Take 1 capsule (0.4 mg) by mouth daily    Urinary retention       warfarin 5 MG tablet    COUMADIN    135 tablet    Take 1 1/2 tablets (7.5 mg) by mouth TWTFSS; 1 tablet (5 mg) on Mondays OR as directed by INR Clinic    Long-term (current) use of anticoagulants, S/P aortic valve replacement

## 2018-05-23 NOTE — TELEPHONE ENCOUNTER
"  ED for acute condition Discharge Protocol    \"Hi, my name is June Munoz, a registered nurse, and I am calling from [facility]3].  I am calling to follow up and see how things are going for you after your recent emergency visit.\"    Tell me how you are doing now that you are home?\" Doing alright. Went home to mow the lawn and feeling good.      Discharge Instructions    \"Let's review your discharge instructions.  What is/are the follow-up recommendations?  Pt. Response: see pcp    \"Has an appointment with your primary care provider been scheduled?\"  Yes. (confirm and remind to bring meds)    Medications    \"Tell me what changed about your medicines when you discharged?\"    Stop Aspirin    \"What questions do you have about your medications?\"   None    On warfarin: \"Were you given any recommendations for follow-up with the anticoagulation clinic?\" Yes - Anticoagulation clinic appointment is already scheduled at appropriate interval    Will go to  INR clinic    Call Summary    \"What questions or concerns do you have about your recent visit and your follow-up care?\"     none    \"If you have questions or things don't continue to improve, we encourage you contact us through the main clinic number (give number).  Even if the clinic is not open, triage nurses are available 24/7 to help you.     We would like you to know that our clinic has extended hours (provide information).  We also have urgent care (provide details on closest location and hours/contact info)\"    \"Thank you for your time and take care!\"    June SERRANO RN, BSN, PHN  [facility] RN                    "

## 2018-05-24 ENCOUNTER — INFUSION THERAPY VISIT (OUTPATIENT)
Dept: INFUSION THERAPY | Facility: CLINIC | Age: 78
End: 2018-05-24
Attending: INTERNAL MEDICINE
Payer: MEDICARE

## 2018-05-24 VITALS
RESPIRATION RATE: 16 BRPM | HEART RATE: 60 BPM | TEMPERATURE: 97.8 F | OXYGEN SATURATION: 98 % | DIASTOLIC BLOOD PRESSURE: 89 MMHG | SYSTOLIC BLOOD PRESSURE: 140 MMHG

## 2018-05-24 DIAGNOSIS — A41.9 SEPSIS (H): ICD-10-CM

## 2018-05-24 DIAGNOSIS — Z95.2 S/P MITRAL VALVE REPLACEMENT: ICD-10-CM

## 2018-05-24 DIAGNOSIS — A40.1 SEPSIS DUE TO GROUP B STREPTOCOCCUS (H): Primary | ICD-10-CM

## 2018-05-24 LAB
BACTERIA SPEC CULT: NO GROWTH
Lab: NORMAL
SPECIMEN SOURCE: NORMAL

## 2018-05-24 PROCEDURE — 27210995 ZZH RX 272: Performed by: INTERNAL MEDICINE

## 2018-05-24 PROCEDURE — 96374 THER/PROPH/DIAG INJ IV PUSH: CPT

## 2018-05-24 PROCEDURE — 25000128 H RX IP 250 OP 636: Performed by: INTERNAL MEDICINE

## 2018-05-24 RX ORDER — CEFTRIAXONE SODIUM 2 G
2 VIAL (EA) INJECTION ONCE
Status: CANCELLED | OUTPATIENT
Start: 2018-05-24 | End: 2018-05-24

## 2018-05-24 RX ORDER — CEFTRIAXONE SODIUM 2 G
2 VIAL (EA) INJECTION ONCE
Status: COMPLETED | OUTPATIENT
Start: 2018-05-24 | End: 2018-05-24

## 2018-05-24 RX ADMIN — WATER 2 G: 1 INJECTION INTRAMUSCULAR; INTRAVENOUS; SUBCUTANEOUS at 09:09

## 2018-05-24 NOTE — MR AVS SNAPSHOT
After Visit Summary   5/24/2018    Fausto Farr    MRN: [id number]           Patient Information     Date Of Birth          1940        Visit Information        Provider Department      5/24/2018 9:00 AM RH INFUSION CHAIR 1 [facility]        Today's Diagnoses     Sepsis due to group B Streptococcus (H)    -  1    Sepsis (H)        S/P mitral valve replacement           Follow-ups after your visit        Your next 10 appointments already scheduled     May 25, 2018  8:30 AM CDT   injection with RH LAB DRAW 1   [facility] ([facility])    [facility]  [address]  [place] [address]   [phone]            May 25, 2018 11:45 AM CDT   Anticoagulation Visit with  ANTICOAGULATION CLINIC   [facility] ([facility])    [address] [place] [address] [phone]            May 26, 2018 10:00 AM CDT   Level 1 with  INFUSION CHAIR 2   [facility] ([facility])    [facility]  [address]  [place] [address]   [phone]            May 27, 2018 10:00 AM CDT   Level 1 with  INFUSION CHAIR 4   [facility] ([facility])    [facility]  [address]  [place] [address]   [phone]            May 28, 2018 10:00 AM CDT   Level 1 with  INFUSION CHAIR 20   [facility] ([facility])    [facility]  [address]  [place] [address]   [phone]            May 29, 2018  9:00 AM CDT   Level 1 with  INFUSION CHAIR 8   [facility] ([facility])    [facility]  [address]  [place] [address]    [phone]            May 30, 2018  9:00 AM CDT   Level 1 with RH INFUSION CHAIR 6   [facility] ([facility])    [facility]  [address]  [facility] [address]   [phone]            May 31, 2018  9:00 AM CDT   Level 1 with RH INFUSION CHAIR 7   [facility] ([facility])    [facility]  [address]  [facility] [address]   [phone]            May 31, 2018  1:10 PM CDT   Office Visit with Tu Reyes MD   [facility] ([facility])    [address] [place] [address]  [address]  [facility] [address] [phone]           Bring a current list of meds and any records pertaining to this visit. For Physicals, please bring immunization records and any forms needing to be filled out. Please arrive 10 minutes early to complete paperwork.            Jun 01, 2018  9:00 AM CDT   Level 1 with RH INFUSION CHAIR 7   [facility] ([facility])    [facility]  [address]  [facility] [address] [phone]              Who to contact     If you have questions or need follow up information about today's clinic visit or your schedule please contact [facility] directly at [phone].  Normal or non-critical lab and imaging results will be communicated to you by MyChart, letter or phone within 4 business days after the clinic has received the results. If you do not hear from us within 7 days, please contact the clinic through MyChart or phone. If you have a critical or abnormal lab result, we will notify you by phone as soon as possible.  Submit refill requests through [employer] or call your pharmacy and they will forward the refill request to us. Please allow 3 business days for your refill to be  "completed.          Additional Information About Your Visit        [employer] Information     [employer] lets you send messages to your doctor, view your test results, renew your prescriptions, schedule appointments and more. To sign up, go to [email or web address] . Click on \"Log in\" on the left side of the screen, which will take you to the Welcome page. Then click on \"Sign up Now\" on the right side of the page.     You will be asked to enter the access code listed below, as well as some personal information. Please follow the directions to create your username and password.     Your access code is: [email or web address]  Expires: 2018  8:30 AM     Your access code will  in 90 days. If you need help or a new code, please call your [place] clinic or [phone].        Care EveryWhere ID     This is your Care EveryWhere ID. This could be used by other organizations to access your [place] medical records  [id number]        Your Vitals Were     Pulse Temperature Respirations Pulse Oximetry          60 97.8  F (36.6  C) (Tympanic) 16 98%         Blood Pressure from Last 3 Encounters:   18 140/89   18 131/69   18 (!) 165/101    Weight from Last 3 Encounters:   18 100.2 kg (220 lb 14.4 oz)   18 102.5 kg (226 lb)   17 99.8 kg (220 lb)              Today, you had the following     No orders found for display       Primary Care Provider Office Phone # Fax #    Tu Reyes [phone] [address] [place] [address]        Equal Access to Services     [facility] : Hadnitin Wu, maria del rosario lopez, denny tucker. So [facility] [phone].    ATENCIÓN: Si habla español, tiene a brown disposición servicios gratuitos de asistencia lingüística. Llame al [phone].    We comply with applicable federal civil rights laws and [place] laws. We do not discriminate on the basis of race, " color, national origin, age, disability, sex, sexual orientation, or gender identity.            Thank you!     Thank you for choosing [facility] INF[facility]ION [facility]  for your care. Our goal is always to provide you with excellent care. Hearing back from our patients is one way we can continue to improve our services. Please take a few minutes to complete the written survey that you may receive in the mail after your visit with us. Thank you!             Your Updated Medication List - Protect others around you: Learn how to safely use, store and throw away your medicines at www.disposemymeds.org.          This list is accurate as of 5/24/18 10:12 AM.  Always use your most recent med list.                   Brand Name Dispense Instructions for use Diagnosis    ASPIRIN NOT PRESCRIBED    INTENTIONAL    0 each    Please choose reason not prescribed, below    Long-term (current) use of anticoagulants, S/P aortic valve replacement       betamethasone valerate 0.1 % lotion    VALISONE    60 mL    APPLY TOPICALLY TWICE DAILY PRN    Eczema, unspecified type       cefTRIAXone 1 GM vial    ROCEPHIN    600 mL    Inject 2 g (2,000 mg) into the vein daily for 10 days CBC with differential, creatinine, SGOT weekly while on this medication to be faxed to Dr. Leija office.    Sepsis due to group B Streptococcus (H)       ezetimibe 10 MG tablet    ZETIA    90 tablet    Take 1 tablet (10 mg) by mouth daily    Mixed hyperlipidemia       fluocinonide 0.05 % ointment    LIDEX    60 g    2- 30 gram tubes.  Apply twice a day as needed.    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)       metoprolol tartrate 25 MG tablet    LOPRESSOR    180  tablet    Take 1 tablet (25 mg) by mouth 2 times daily    Coronary artery disease involving coronary bypass graft 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       rosuvastatin 40 MG tablet    CRESTOR    90 tablet    Take 1 tablet (40 mg) by mouth daily    Hyperlipidemia, unspecified hyperlipidemia type       tamsulosin 0.4 MG capsule    FLOMAX    90 capsule    Take 1 capsule (0.4 mg) by mouth daily    Urinary retention       warfarin 5 MG tablet    COUMADIN    135 tablet    Take 1 1/2 tablets (7.5 mg) by mouth TWTFSS; 1 tablet (5 mg) on Mondays OR as directed by INR Clinic    Long-term (current) use of anticoagulants, S/P aortic valve replacement

## 2018-05-24 NOTE — PROGRESS NOTES
Infusion Nursing Note:  Fausto Farr presents today for Rocephin.    Patient seen by provider today: No   present during visit today: Not Applicable.    Note: has had 3 episodes diarrhea over last 24 hours. Discussed symptoms of C Dif; he doesn't feel he has any unusual symptoms, will try decreasing fiber, OTC Imodium.    Intravenous Access:  Midline.    Treatment Conditions:  NA      Post Infusion Assessment:  Patient tolerated infusion without incident.  Blood return noted pre and post infusion.  Site patent and intact, free from redness, edema or discomfort.  No evidence of extravasations.  Access discontinued per protocol.    Discharge Plan:   Patient and/or family verbalized understanding of discharge instructions and all questions answered.  Patient will return 5/25 for next appointment.   Patient discharged in stable condition accompanied by: self.  Departure Mode: Ambulatory.    Lee Ann Enriquez RN

## 2018-05-25 ENCOUNTER — ANTICOAGULATION THERAPY VISIT (OUTPATIENT)
Dept: NURSING | Facility: CLINIC | Age: 78
End: 2018-05-25
Payer: MEDICARE

## 2018-05-25 ENCOUNTER — ALLIED HEALTH/NURSE VISIT (OUTPATIENT)
Dept: INFUSION THERAPY | Facility: CLINIC | Age: 78
End: 2018-05-25
Attending: INTERNAL MEDICINE
Payer: MEDICARE

## 2018-05-25 VITALS — SYSTOLIC BLOOD PRESSURE: 105 MMHG | RESPIRATION RATE: 16 BRPM | TEMPERATURE: 97.8 F | DIASTOLIC BLOOD PRESSURE: 57 MMHG

## 2018-05-25 DIAGNOSIS — A41.9 SEPSIS (H): ICD-10-CM

## 2018-05-25 DIAGNOSIS — A40.1 SEPSIS DUE TO GROUP B STREPTOCOCCUS (H): Primary | ICD-10-CM

## 2018-05-25 DIAGNOSIS — I48.91 AF (ATRIAL FIBRILLATION) (H): ICD-10-CM

## 2018-05-25 DIAGNOSIS — Z95.2 S/P MITRAL VALVE REPLACEMENT: ICD-10-CM

## 2018-05-25 DIAGNOSIS — Z79.01 LONG-TERM (CURRENT) USE OF ANTICOAGULANTS: ICD-10-CM

## 2018-05-25 LAB
BACTERIA SPEC CULT: NO GROWTH
INR POINT OF CARE: 2.9 (ref 0.86–1.14)
Lab: NORMAL
SPECIMEN SOURCE: NORMAL

## 2018-05-25 PROCEDURE — 36416 COLLJ CAPILLARY BLOOD SPEC: CPT

## 2018-05-25 PROCEDURE — 96374 THER/PROPH/DIAG INJ IV PUSH: CPT

## 2018-05-25 PROCEDURE — 99207 ZZC NO CHARGE NURSE ONLY: CPT

## 2018-05-25 PROCEDURE — 25000128 H RX IP 250 OP 636: Performed by: INTERNAL MEDICINE

## 2018-05-25 PROCEDURE — 85610 PROTHROMBIN TIME: CPT | Mod: QW

## 2018-05-25 PROCEDURE — 27210995 ZZH RX 272: Performed by: INTERNAL MEDICINE

## 2018-05-25 RX ORDER — CEFTRIAXONE SODIUM 2 G
2 VIAL (EA) INJECTION ONCE
Status: CANCELLED | OUTPATIENT
Start: 2018-05-25 | End: 2018-05-25

## 2018-05-25 RX ORDER — CEFTRIAXONE SODIUM 2 G
2 VIAL (EA) INJECTION ONCE
Status: COMPLETED | OUTPATIENT
Start: 2018-05-25 | End: 2018-05-25

## 2018-05-25 RX ADMIN — WATER 2 G: 1 INJECTION INTRAMUSCULAR; INTRAVENOUS; SUBCUTANEOUS at 08:31

## 2018-05-25 NOTE — PROGRESS NOTES
Infusion Nursing Note:  Fausto Farr presents today for rocephin.    Patient seen by provider today: No   present during visit today: Not Applicable.    Note: N/A.    Intravenous Access:  Midline.    Treatment Conditions:  Not Applicable.      Post Infusion Assessment:  Patient tolerated infusion without incident.  Blood return noted pre and post infusion.  Site patent and intact, free from redness, edema or discomfort.  No evidence of extravasations.    Discharge Plan:   Patient declined prescription refills.  Discharge instructions reviewed with: Patient.  Patient and/or family verbalized understanding of discharge instructions and all questions answered.  Copy of AVS reviewed with patient and/or family.  Patient will return 5/26/18 for next appointment.  Patient discharged in stable condition accompanied by: self.  Departure Mode: Ambulatory.    Angelika Paniagua RN

## 2018-05-25 NOTE — MR AVS SNAPSHOT
Fausto Carson Yordan   5/25/2018 11:45 AM   Anticoagulation Therapy Visit    Description:  78 year old male   Provider:  [facility]   Department:   Nurse           INR as of 5/25/2018     Today's INR 2.9      Anticoagulation Summary as of 5/25/2018     INR goal 2.5-3.5   Today's INR 2.9   Full warfarin instructions 5 mg on Mon; 7.5 mg all other days   Next INR check 5/31/2018    Indications   AF (atrial fibrillation) (H) [I48.91]  S/P mitral valve replacement [Z95.2]  Long-term (current) use of anticoagulants [Z79.01] [Z79.01]         Your next Anticoagulation Clinic appointment(s)     May 31, 2018  1:30 PM CDT   Anticoagulation Visit with  ANTICOAGULATION CLINIC   [facility] ([facility])    [address] [place] [address]  [address]  [facility] [address] [phone]              Contact Numbers     [facility]  Please call to cancel and/or reschedule your appointment, or with any problems or questions regarding your therapy.  Anticoagulation Nurse: [phone]  Main Clinic: [phone]             May 2018 Details    Sun Mon Tue Wed Thu Fri Sat       1               2               3               4               5                 6               7               8               9               10               11               12                 13               14               15               16               17               18               19                 20               21               22               23               24               25      7.5 mg   See details      26      7.5 mg           27      7.5 mg         28      5 mg         29      7.5 mg         30      7.5 mg         31               Date Details   05/25 This INR check       Date of next INR:  5/31/2018         How to take your warfarin dose     To take:  5 mg Take 1 of the 5 mg tablets.    To take:  7.5 mg Take 1.5 of the 5 mg tablets.

## 2018-05-25 NOTE — PROGRESS NOTES
ANTICOAGULATION FOLLOW-UP CLINIC VISIT    Patient Name:  Fausto Farr  Date:  5/25/2018  Contact Type:  Face to Face    SUBJECTIVE:     Patient Findings     Positives Other complaints (had 3 episodes diarrhea 5/24/18), Antibiotic use or infection (Daily Rocephin infusion via port - right arm)    Comments [facility] 5/18/18 -1/22/18   Sepsis due to Group B streptococcus  Left lower extremity cellulitis                OBJECTIVE    INR Protime   Date Value Ref Range Status   05/25/2018 2.9 (A) 0.86 - 1.14 Final       ASSESSMENT / PLAN  INR assessment THER    Recheck INR In: 6 DAYS    INR Location Clinic      Anticoagulation Summary as of 5/25/2018     INR goal 2.5-3.5   Today's INR 2.9   Warfarin maintenance plan 5 mg (5 mg x 1) on Mon; 7.5 mg (5 mg x 1.5) all other days   Full warfarin instructions 5 mg on Mon; 7.5 mg all other days   Weekly warfarin total 50 mg   Plan last modified Caryn Campos RN (11/9/2017)   Next INR check 5/31/2018   Priority INR   Target end date Indefinite    Indications   AF (atrial fibrillation) (H) [I48.91]  S/P mitral valve replacement [Z95.2]  Long-term (current) use of anticoagulants [Z79.01] [Z79.01]         Anticoagulation Episode Summary     INR check location     Preferred lab     Send INR reminders to [facility] CLINIC    Comments 5mg tabs - andrade dose // transfer from [facility] // [facility] // CALENDAR      Anticoagulation Care Providers     Provider Role Specialty Phone number    Tu Reyes MD  Internal Medicine [phone]            See the Encounter Report to view Anticoagulation Flowsheet and Dosing Calendar (Go to Encounters tab in chart review, and find the Anticoagulation Therapy Visit)    Dosage adjustment made based on physician directed care plan.    Antonette Price, JUSTIN

## 2018-05-25 NOTE — MR AVS SNAPSHOT
After Visit Summary   5/25/2018    Fausto Farr    MRN: [id number]           Patient Information     Date Of Birth          1940        Visit Information        Provider Department      5/25/2018 8:30 AM RH LAB DRAW 1 [facility]        Today's Diagnoses     Sepsis due to group B Streptococcus (H)    -  1    Sepsis (H)        S/P mitral valve replacement           Follow-ups after your visit        Your next 10 appointments already scheduled     May 25, 2018 11:45 AM CDT   Anticoagulation Visit with  ANTICOAGULATION CLINIC   [facility] ([facility])    [address] [place] [address]   [phone]            May 26, 2018 10:00 AM CDT   Level 1 with  INFUSION CHAIR 2   [facility] ([facility])    [facility]  [address]  [place] [address]   [phone]            May 27, 2018 10:00 AM CDT   Level 1 with  INFUSION CHAIR 4   [facility] ([facility])    [facility]  [address]  [place] [address]   [phone]            May 28, 2018 10:00 AM CDT   Level 1 with  INFUSION CHAIR 20   [facility] ([facility])    [facility]  [address]  [place] [address]   [phone]            May 29, 2018  9:00 AM CDT   Level 1 with  INFUSION CHAIR 8   [facility] ([facility])    [facility] [place] [address]  [place] [address]   [phone]            May 30, 2018  9:00 AM CDT   Level 1 with RH INFUSION CHAIR 6   [facility] ([facility])    [facility]  [address]  [place] [address]   [phone]             May 31, 2018  9:00 AM CDT   Level 1 with RH INFUSION CHAIR 7   [facility] ([facility])    [facility]  [address]  [facility] [address]   [phone]            May 31, 2018  1:10 PM CDT   [facility]   [facility]    [address]  [address]  [facility] [place] [address] [phone]           Bring a current list of meds and any records pertaining to this visit. For Physicals, please bring immunization records and any forms needing to be filled out. Please arrive 10 minutes early to complete paperwork.            Jun 01, 2018  9:00 AM CDT   Level 1 with RH INFUSION CHAIR 7   [facility] ([facility])    [facility]  [address]  [facility] [address]   [phone]            Jul 12, 2018  9:00 AM CDT   Return Visit with Calderon Santo MD   [facility] ([facility])    [address]  [facility] [id number] [phone]              Who to contact     If you have questions or need follow up information about today's clinic visit or your schedule please contact [facility] directly at [phone].  Normal or non-critical lab and imaging results will be communicated to you by MyChart, letter or phone within 4 business days after the clinic has received the results. If you do not hear from us within 7 days, please contact the clinic through MyChart or phone. If you have a critical or abnormal lab result, we will notify you by phone as soon as possible.  Submit refill requests through [employer] or call your pharmacy and they will forward the refill request to us. Please allow 3 business days for your refill to be completed.          Additional Information  "About Your Visit        MyChart Information     [facility] lets you send messages to your doctor, view your test results, renew your prescriptions, schedule appointments and more. To sign up, go to [email or web address] . Click on \"Log in\" on the left side of the screen, which will take you to the Welcome page. Then click on \"Sign up Now\" on the right side of the page.     You will be asked to enter the access code listed below, as well as some personal information. Please follow the directions to create your username and password.     Your access code is: [email or web address]  Expires: 2018  8:30 AM     Your access code will  in 90 days. If you need help or a new code, please call your [place] clinic or [phone].        Care EveryWhere ID     This is your Care EveryWhere ID. This could be used by other organizations to access your [place] medical records  [id number]        Your Vitals Were     Temperature Respirations                97.8  F (36.6  C) (Tympanic) 16           Blood Pressure from Last 3 Encounters:   18 105/57   18 140/89   18 131/69    Weight from Last 3 Encounters:   18 100.2 kg (220 lb 14.4 oz)   18 102.5 kg (226 lb)   17 99.8 kg (220 lb)              Today, you had the following     No orders found for display       Primary Care Provider Office Phone # Fax #    Tu Reyes [phone] [address] [place] [address]        Equal Access to Services     [facility] AH: Hadii ted garsia hadasho Soomaali, waaxda luqadaha, qaybta kaalmada denny rm. So [facility] [phone].    ATENCIÓN: Si habla español, tiene a brown disposición servicios gratuitos de asistencia lingüística. Llame al [phone].    We comply with applicable federal civil rights laws and [place] laws. We do not discriminate on the basis of race, color, national origin, age, disability, sex, sexual orientation, or " gender identity.            Thank you!     Thank you for choosing [facility]  for your care. Our goal is always to provide you with excellent care. Hearing back from our patients is one way we can continue to improve our services. Please take a few minutes to complete the written survey that you may receive in the mail after your visit with us. Thank you!             Your Updated Medication List - Protect others around you: Learn how to safely use, store and throw away your medicines at www.disposemymeds.org.          This list is accurate as of 5/25/18  8:43 AM.  Always use your most recent med list.                   Brand Name Dispense Instructions for use Diagnosis    ASPIRIN NOT PRESCRIBED    INTENTIONAL    0 each    Please choose reason not prescribed, below    Long-term (current) use of anticoagulants, S/P aortic valve replacement       betamethasone valerate 0.1 % lotion    VALISONE    60 mL    APPLY TOPICALLY TWICE DAILY PRN    Eczema, unspecified type       cefTRIAXone 1 GM vial    ROCEPHIN    600 mL    Inject 2 g (2,000 mg) into the vein daily for 10 days CBC with differential, creatinine, SGOT weekly while on this medication to be faxed to Dr. Leija office.    Sepsis due to group B Streptococcus (H)       ezetimibe 10 MG tablet    ZETIA    90 tablet    Take 1 tablet (10 mg) by mouth daily    Mixed hyperlipidemia       fluocinonide 0.05 % ointment    LIDEX    60 g    2- 30 gram tubes.  Apply twice a day as needed.    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)       metoprolol tartrate 25 MG tablet    LOPRESSOR    180 tablet    Take 1 tablet (25 mg) by mouth 2 times daily    Coronary  artery disease involving coronary bypass graft 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       rosuvastatin 40 MG tablet    CRESTOR    90 tablet    Take 1 tablet (40 mg) by mouth daily    Hyperlipidemia, unspecified hyperlipidemia type       tamsulosin 0.4 MG capsule    FLOMAX    90 capsule    Take 1 capsule (0.4 mg) by mouth daily    Urinary retention       warfarin 5 MG tablet    COUMADIN    135 tablet    Take 1 1/2 tablets (7.5 mg) by mouth TWTFSS; 1 tablet (5 mg) on Mondays OR as directed by INR Clinic    Long-term (current) use of anticoagulants, S/P aortic valve replacement

## 2018-05-26 ENCOUNTER — INFUSION THERAPY VISIT (OUTPATIENT)
Dept: INFUSION THERAPY | Facility: CLINIC | Age: 78
End: 2018-05-26
Attending: INTERNAL MEDICINE
Payer: MEDICARE

## 2018-05-26 VITALS
TEMPERATURE: 98 F | SYSTOLIC BLOOD PRESSURE: 135 MMHG | HEART RATE: 78 BPM | RESPIRATION RATE: 16 BRPM | DIASTOLIC BLOOD PRESSURE: 83 MMHG

## 2018-05-26 DIAGNOSIS — A40.1 SEPSIS DUE TO GROUP B STREPTOCOCCUS (H): Primary | ICD-10-CM

## 2018-05-26 DIAGNOSIS — Z95.2 S/P MITRAL VALVE REPLACEMENT: ICD-10-CM

## 2018-05-26 DIAGNOSIS — A41.9 SEPSIS (H): ICD-10-CM

## 2018-05-26 LAB
BACTERIA SPEC CULT: NO GROWTH
Lab: NORMAL
SPECIMEN SOURCE: NORMAL

## 2018-05-26 PROCEDURE — 96374 THER/PROPH/DIAG INJ IV PUSH: CPT

## 2018-05-26 PROCEDURE — 25000128 H RX IP 250 OP 636: Performed by: INTERNAL MEDICINE

## 2018-05-26 PROCEDURE — 27210995 ZZH RX 272: Performed by: INTERNAL MEDICINE

## 2018-05-26 RX ORDER — CEFTRIAXONE SODIUM 2 G
2 VIAL (EA) INJECTION ONCE
Status: CANCELLED | OUTPATIENT
Start: 2018-05-26 | End: 2018-05-26

## 2018-05-26 RX ORDER — CEFTRIAXONE SODIUM 2 G
2 VIAL (EA) INJECTION ONCE
Status: COMPLETED | OUTPATIENT
Start: 2018-05-26 | End: 2018-05-26

## 2018-05-26 RX ADMIN — WATER 2 G: 1 INJECTION INTRAMUSCULAR; INTRAVENOUS; SUBCUTANEOUS at 09:49

## 2018-05-26 ASSESSMENT — PAIN SCALES - GENERAL: PAINLEVEL: MILD PAIN (3)

## 2018-05-26 NOTE — PROGRESS NOTES
Infusion Nursing Note:  Fausto Farr presents today for rocephin.    Patient seen by provider today: No   present during visit today: Not Applicable.    Note: N/A.    Intravenous Access:  midline.    Treatment Conditions:  Not Applicable.      Post Infusion Assessment:  Patient tolerated infusion without incident.  Site patent and intact, free from redness, edema or discomfort.  No evidence of extravasations.    Discharge Plan:   Patient and/or family verbalized understanding of discharge instructions and all questions answered.  Copy of AVS reviewed with patient and/or family.  Patient will return tomorrow for next appointment.  Patient discharged in stable condition accompanied by: wife.  Departure Mode: Ambulatory.    Paige Britton RN

## 2018-05-26 NOTE — MR AVS SNAPSHOT
After Visit Summary   5/26/2018    Fausto Farr    MRN: [id number]           Patient Information     Date Of Birth          1940        Visit Information        Provider Department      5/26/2018 10:00 AM SH INFUSION CHAIR 2 [facility]        Today's Diagnoses     Sepsis due to group B Streptococcus (H)    -  1    Sepsis (H)        S/P mitral valve replacement           Follow-ups after your visit        Your next 10 appointments already scheduled     May 26, 2018 10:00 AM CDT   Level 1 with SH INFUSION CHAIR 2   [facility] ([facility])    [facility] [place]  [address]  [place] [address]   [phone]            May 27, 2018 10:00 AM CDT   Level 1 with SH INFUSION CHAIR 4   [facility] ([facility])    [facility] [place]  [address]  [place] [address]   [phone]            May 28, 2018 10:00 AM CDT   Level 1 with SH INFUSION CHAIR 20   [facility] ([facility])    [facility] [place]  [address]  [place] [address]   [phone]            May 29, 2018  9:00 AM CDT   Level 1 with RH INFUSION CHAIR 8   [facility] ([facility])    [facility] [place] [address]  [place] [address]   [phone]            May 30, 2018  9:00 AM CDT   Level 1 with RH INFUSION CHAIR 6   [facility] ([facility])    [facility]  [address]  [place] [address]   [phone]            May 31, 2018  9:00 AM CDT   Level 1 with RH INFUSION CHAIR 7   [facility] ([facility])    [facility]  [address]  [facility] [address]   [phone]            May 31, 2018  1:10 PM CDT   Office Visit with Tu Reyes MD   [facility] ([facility])    [address]  [address]  [place] [address] [phone]           Bring a current list of meds and any records pertaining to this visit. For Physicals, please bring immunization records and any forms needing to be filled out. Please arrive 10 minutes early to complete paperwork.            May 31, 2018  1:30 PM CDT   Anticoagulation Visit with EA ANTICOAGULATION CLINIC   [facility] [place] ([facility])    [address]  [address]  [place] [address] [phone]            Jun 01, 2018  9:00 AM CDT   Level 1 with RH INFUSION CHAIR 7   [facility] ([facility])    [facility]  [address]  [facility] [address] [phone]            Jul 12, 2018  9:00 AM CDT   Return Visit with Calderon Santo MD   [facility] ([facility])    [address]  [facility] [address] [phone]              Who to contact     If you have questions or need follow up information about today's clinic visit or your schedule please contact [facility] directly at [phone].  Normal or non-critical lab and imaging results will be communicated to you by MyChart, letter or phone within 4 business days after the clinic has received the results. If you do not hear from us within 7 days, please contact the clinic through MyChart or phone. If you have a critical or abnormal lab result, we will notify you by phone as soon as possible.  Submit refill requests through [employer] or call your pharmacy and they will forward the refill request to us. Please allow 3 business days for your refill to be completed.          Additional  "Information About Your Visit        MyChart Information     [facility] lets you send messages to your doctor, view your test results, renew your prescriptions, schedule appointments and more. To sign up, go to [email or web address] . Click on \"Log in\" on the left side of the screen, which will take you to the Welcome page. Then click on \"Sign up Now\" on the right side of the page.     You will be asked to enter the access code listed below, as well as some personal information. Please follow the directions to create your username and password.     Your access code is: [email or web address]  Expires: 2018  8:30 AM     Your access code will  in 90 days. If you need help or a new code, please call your [place] clinic or [phone].        Care EveryWhere ID     This is your Care EveryWhere ID. This could be used by other organizations to access your [place] medical records  [id number]        Your Vitals Were     Pulse Temperature Respirations             78 98  F (36.7  C) (Oral) 16          Blood Pressure from Last 3 Encounters:   18 135/83   18 105/57   18 140/89    Weight from Last 3 Encounters:   18 100.2 kg (220 lb 14.4 oz)   18 102.5 kg (226 lb)   17 99.8 kg (220 lb)              Today, you had the following     No orders found for display       Primary Care Provider Office Phone # Fax #    Tu Reyes [phone] [address] [place] [address]        Equal Access to Services     [facility] AH: Hadii ted garsia hadasho Soomaali, waaxda luqadaha, qaybta kaalmada denny rm. So [facility] [phone].    ATENCIÓN: Si habla español, tiene a brown disposición servicios gratuitos de asistencia lingüística. Llame al [phone].    We comply with applicable federal civil rights laws and [place] laws. We do not discriminate on the basis of race, color, national origin, age, disability, sex, sexual " orientation, or gender identity.            Thank you!     Thank you for choosing [facility]  for your care. Our goal is always to provide you with excellent care. Hearing back from our patients is one way we can continue to improve our services. Please take a few minutes to complete the written survey that you may receive in the mail after your visit with us. Thank you!             Your Updated Medication List - Protect others around you: Learn how to safely use, store and throw away your medicines at www.disposemymeds.org.          This list is accurate as of 5/26/18  9:56 AM.  Always use your most recent med list.                   Brand Name Dispense Instructions for use Diagnosis    ASPIRIN NOT PRESCRIBED    INTENTIONAL    0 each    Please choose reason not prescribed, below    Long-term (current) use of anticoagulants, S/P aortic valve replacement       betamethasone valerate 0.1 % lotion    VALISONE    60 mL    APPLY TOPICALLY TWICE DAILY PRN    Eczema, unspecified type       cefTRIAXone 1 GM vial    ROCEPHIN    600 mL    Inject 2 g (2,000 mg) into the vein daily for 10 days CBC with differential, creatinine, SGOT weekly while on this medication to be faxed to Dr. Leija office.    Sepsis due to group B Streptococcus (H)       ezetimibe 10 MG tablet    ZETIA    90 tablet    Take 1 tablet (10 mg) by mouth daily    Mixed hyperlipidemia       fluocinonide 0.05 % ointment    LIDEX    60 g    2- 30 gram tubes.  Apply twice a day as needed.    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)       metoprolol tartrate 25 MG tablet    LOPRESSOR    180 tablet    Take 1 tablet (25 mg) by mouth 2 times daily     Coronary artery disease involving coronary bypass graft 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       rosuvastatin 40 MG tablet    CRESTOR    90 tablet    Take 1 tablet (40 mg) by mouth daily    Hyperlipidemia, unspecified hyperlipidemia type       tamsulosin 0.4 MG capsule    FLOMAX    90 capsule    Take 1 capsule (0.4 mg) by mouth daily    Urinary retention       warfarin 5 MG tablet    COUMADIN    135 tablet    Take 1 1/2 tablets (7.5 mg) by mouth TWTFSS; 1 tablet (5 mg) on Mondays OR as directed by INR Clinic    Long-term (current) use of anticoagulants, S/P aortic valve replacement

## 2018-05-27 ENCOUNTER — INFUSION THERAPY VISIT (OUTPATIENT)
Dept: INFUSION THERAPY | Facility: CLINIC | Age: 78
End: 2018-05-27
Attending: INTERNAL MEDICINE
Payer: MEDICARE

## 2018-05-27 VITALS
HEART RATE: 54 BPM | DIASTOLIC BLOOD PRESSURE: 69 MMHG | SYSTOLIC BLOOD PRESSURE: 132 MMHG | TEMPERATURE: 98.5 F | RESPIRATION RATE: 16 BRPM

## 2018-05-27 DIAGNOSIS — A40.1 SEPSIS DUE TO GROUP B STREPTOCOCCUS (H): Primary | ICD-10-CM

## 2018-05-27 DIAGNOSIS — Z95.2 S/P MITRAL VALVE REPLACEMENT: ICD-10-CM

## 2018-05-27 LAB
BACTERIA SPEC CULT: NO GROWTH
Lab: NORMAL
SPECIMEN SOURCE: NORMAL

## 2018-05-27 PROCEDURE — 25000128 H RX IP 250 OP 636: Performed by: INTERNAL MEDICINE

## 2018-05-27 PROCEDURE — 27210995 ZZH RX 272: Performed by: INTERNAL MEDICINE

## 2018-05-27 PROCEDURE — 96374 THER/PROPH/DIAG INJ IV PUSH: CPT

## 2018-05-27 RX ORDER — CEFTRIAXONE SODIUM 2 G
2 VIAL (EA) INJECTION ONCE
Status: CANCELLED | OUTPATIENT
Start: 2018-05-27 | End: 2018-05-27

## 2018-05-27 RX ORDER — CEFTRIAXONE SODIUM 2 G
2 VIAL (EA) INJECTION ONCE
Status: COMPLETED | OUTPATIENT
Start: 2018-05-27 | End: 2018-05-27

## 2018-05-27 RX ADMIN — WATER 2 G: 1 INJECTION INTRAMUSCULAR; INTRAVENOUS; SUBCUTANEOUS at 09:50

## 2018-05-27 ASSESSMENT — PAIN SCALES - GENERAL: PAINLEVEL: NO PAIN (0)

## 2018-05-27 NOTE — MR AVS SNAPSHOT
After Visit Summary   5/27/2018    Fausto Farr    MRN: [id number]           Patient Information     Date Of Birth          1940        Visit Information        Provider Department      5/27/2018 10:00 AM SH INFUSION CHAIR 4 [facility]        Today's Diagnoses     Sepsis due to group B Streptococcus (H)    -  1    S/P mitral valve replacement           Follow-ups after your visit        Your next 10 appointments already scheduled     May 27, 2018 10:00 AM CDT   Level 1 with SH INFUSION CHAIR 4   [facility] ([facility])    [facility]  [address]  [place] [address]   [phone]            May 28, 2018 10:00 AM CDT   Level 1 with SH INFUSION CHAIR 20   [facility] ([facility])    [facility] [place]  [address]  [place] [address]   [phone]            May 29, 2018  9:00 AM CDT   Level 1 with RH INFUSION CHAIR 8   [facility] ([facility])    [facility]  [address]  [facility] [address]   [phone]            May 30, 2018  9:00 AM CDT   Level 1 with RH INFUSION CHAIR 6   [facility] ([facility])    [facility]  [address]  [facility] [address]   [phone]            May 31, 2018  9:00 AM CDT   Level 1 with RH INFUSION CHAIR 7   [facility] ([facility])    [facility]  [address]  [facility] [address]   [phone]            May 31, 2018  1:10 PM CDT   Office Visit with Tu Reyes MD   [facility] [place] ([facility] [place])    [address] [place] [address]  [address]  [place] [address] [phone]            Bring a current list of meds and any records pertaining to this visit. For Physicals, please bring immunization records and any forms needing to be filled out. Please arrive 10 minutes early to complete paperwork.            May 31, 2018  1:30 PM CDT   Anticoagulation Visit with  ANTICOAGULATION CLINIC   [facility] [place] ([facility])    [address]  [address]  [place] [address]   [phone]            Jun 01, 2018  9:00 AM CDT   Level 1 with  INFUSION CHAIR 7   [facility] ([facility])    [facility]  [address]  [place] [address]   [phone]            Jul 12, 2018  7:45 AM CDT   LAB with RU LAB   [facility] [place] ([facility])    [address]  [facility] [id number] [phone]           Please do not eat 10-12 hours before your appointment if you are coming in fasting for labs on lipids, cholesterol, or glucose (sugar). This does not apply to pregnant women. Water, hot tea and black coffee (with nothing added) are okay. Do not drink other fluids, diet soda or chew gum.            Jul 12, 2018  9:00 AM CDT   Return Visit with Calderon Santo MD   [facility] ([facility])    [address]  [facility] [address] [phone]              Who to contact     If you have questions or need follow up information about today's clinic visit or your schedule please contact [facility] directly at [phone].  Normal or non-critical lab and imaging results will be communicated to you by MyChart, letter or phone within 4 business days after the clinic has received the results. If you do not hear from us within 7 days, please contact the clinic through MyChart or phone. If you have a critical or abnormal lab result, we will  "notify you by phone as soon as possible.  Submit refill requests through [employer] or call your pharmacy and they will forward the refill request to us. Please allow 3 business days for your refill to be completed.          Additional Information About Your Visit        [employer] Information     Rawbots lets you send messages to your doctor, view your test results, renew your prescriptions, schedule appointments and more. To sign up, go to [email or web address] . Click on \"Log in\" on the left side of the screen, which will take you to the Welcome page. Then click on \"Sign up Now\" on the right side of the page.     You will be asked to enter the access code listed below, as well as some personal information. Please follow the directions to create your username and password.     Your access code is: [email or web address]  Expires: 2018  8:30 AM     Your access code will  in 90 days. If you need help or a new code, please call your [place] clinic or [phone].        Care EveryWhere ID     This is your Care EveryWhere ID. This could be used by other organizations to access your [place] medical records  [id number]        Your Vitals Were     Pulse Temperature Respirations             54 98.5  F (36.9  C) (Oral) 16          Blood Pressure from Last 3 Encounters:   18 132/69   18 135/83   18 105/57    Weight from Last 3 Encounters:   18 100.2 kg (220 lb 14.4 oz)   18 102.5 kg (226 lb)   17 99.8 kg (220 lb)              Today, you had the following     No orders found for display       Primary Care Provider Office Phone # Fax #    Tu Reyes [phone] [address] [place] [address]        Equal Access to Services     [facility] : Josi Wu, wabeverley lopez, qaybta kaalcora rm, denny goldberg. [facility] [phone].    ATENCIÓN: Si habla español, tiene a brown disposición servicios " lara de asistencia lingüística. Rebecca zurita [phone].    We comply with applicable federal civil rights laws and [place] laws. We do not discriminate on the basis of race, color, national origin, age, disability, sex, sexual orientation, or gender identity.            Thank you!     Thank you for choosing [facility]  for your care. Our goal is always to provide you with excellent care. Hearing back from our patients is one way we can continue to improve our services. Please take a few minutes to complete the written survey that you may receive in the mail after your visit with us. Thank you!             Your Updated Medication List - Protect others around you: Learn how to safely use, store and throw away your medicines at www.disposemymeds.org.          This list is accurate as of 5/27/18  9:58 AM.  Always use your most recent med list.                   Brand Name Dispense Instructions for use Diagnosis    ASPIRIN NOT PRESCRIBED    INTENTIONAL    0 each    Please choose reason not prescribed, below    Long-term (current) use of anticoagulants, S/P aortic valve replacement       betamethasone valerate 0.1 % lotion    VALISONE    60 mL    APPLY TOPICALLY TWICE DAILY PRN    Eczema, unspecified type       cefTRIAXone 1 GM vial    ROCEPHIN    600 mL    Inject 2 g (2,000 mg) into the vein daily for 10 days CBC with differential, creatinine, SGOT weekly while on this medication to be faxed to Dr. Leija office.    Sepsis due to group B Streptococcus (H)       ezetimibe 10 MG tablet    ZETIA    90 tablet    Take 1 tablet (10 mg) by mouth daily    Mixed hyperlipidemia       fluocinonide 0.05 % ointment    LIDEX    60 g    2- 30 gram tubes.  Apply twice a day as needed.    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)       metoprolol tartrate 25 MG tablet    LOPRESSOR    180 tablet    Take 1 tablet (25 mg) by mouth 2 times daily    Coronary artery disease involving coronary bypass graft 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       rosuvastatin 40 MG tablet    CRESTOR    90 tablet    Take 1 tablet (40 mg) by mouth daily    Hyperlipidemia, unspecified hyperlipidemia type       tamsulosin 0.4 MG capsule    FLOMAX    90 capsule    Take 1 capsule (0.4 mg) by mouth daily    Urinary retention       warfarin 5 MG tablet    COUMADIN    135 tablet    Take 1 1/2 tablets (7.5 mg) by mouth TWTFSS; 1 tablet (5 mg) on Mondays OR as directed by INR Clinic    Long-term (current) use of anticoagulants, S/P aortic valve replacement

## 2018-05-27 NOTE — PROGRESS NOTES
Infusion Nursing Note:  Fausto Farr presents today for rocephin.    Patient seen by provider today: No   present during visit today: Not Applicable.    Note: N/A.    Intravenous Access:  Midline.    Treatment Conditions:  Not Applicable.      Post Infusion Assessment:  Patient tolerated infusion without incident.  Site patent and intact, free from redness, edema or discomfort.  No evidence of extravasations.    Discharge Plan:   Copy of AVS reviewed with patient and/or family.  Patient will return tomorrow for next appointment.  Patient discharged in stable condition accompanied by: self.  Departure Mode: Ambulatory.    Paige Britton RN

## 2018-05-28 ENCOUNTER — INFUSION THERAPY VISIT (OUTPATIENT)
Dept: INFUSION THERAPY | Facility: CLINIC | Age: 78
End: 2018-05-28
Attending: INTERNAL MEDICINE
Payer: MEDICARE

## 2018-05-28 VITALS
DIASTOLIC BLOOD PRESSURE: 75 MMHG | HEART RATE: 66 BPM | TEMPERATURE: 97.5 F | RESPIRATION RATE: 16 BRPM | SYSTOLIC BLOOD PRESSURE: 141 MMHG

## 2018-05-28 DIAGNOSIS — Z95.2 S/P MITRAL VALVE REPLACEMENT: ICD-10-CM

## 2018-05-28 DIAGNOSIS — A40.1 SEPSIS DUE TO GROUP B STREPTOCOCCUS (H): Primary | ICD-10-CM

## 2018-05-28 DIAGNOSIS — A41.9 SEPSIS (H): ICD-10-CM

## 2018-05-28 LAB
BACTERIA SPEC CULT: NO GROWTH
Lab: NORMAL
SPECIMEN SOURCE: NORMAL

## 2018-05-28 PROCEDURE — 96374 THER/PROPH/DIAG INJ IV PUSH: CPT

## 2018-05-28 PROCEDURE — 27210995 ZZH RX 272: Performed by: INTERNAL MEDICINE

## 2018-05-28 PROCEDURE — 25000128 H RX IP 250 OP 636: Performed by: INTERNAL MEDICINE

## 2018-05-28 RX ORDER — CEFTRIAXONE SODIUM 2 G
2 VIAL (EA) INJECTION ONCE
Status: CANCELLED | OUTPATIENT
Start: 2018-05-28 | End: 2018-05-28

## 2018-05-28 RX ORDER — CEFTRIAXONE SODIUM 2 G
2 VIAL (EA) INJECTION ONCE
Status: COMPLETED | OUTPATIENT
Start: 2018-05-28 | End: 2018-05-28

## 2018-05-28 RX ADMIN — WATER 2 G: 1 INJECTION INTRAMUSCULAR; INTRAVENOUS; SUBCUTANEOUS at 09:47

## 2018-05-28 NOTE — PROGRESS NOTES
Infusion Nursing Note:  Fausto Farr presents today for Rocephin.    Patient seen by provider today: No   present during visit today: Not Applicable.    Note: Labs and dressing change due tomorrow.    Intravenous Access:  Midline.    Treatment Conditions:  Not Applicable.      Post Infusion Assessment:  Patient tolerated infusion without incident.  Blood return noted pre and post infusion.  Site patent and intact, free from redness, edema or discomfort.  No evidence of extravasations.    Discharge Plan:   Discharge instructions reviewed with: Patient.  Patient and/or family verbalized understanding of discharge instructions and all questions answered.  Copy of AVS reviewed with patient and/or family.  Patient will [facility] appointment.  Patient discharged in stable condition accompanied by: self.  Departure Mode: Ambulatory.    Shalom Jiménez RN

## 2018-05-28 NOTE — MR AVS SNAPSHOT
After Visit Summary   5/28/2018    Fausto Farr    MRN: [id number]           Patient Information     Date Of Birth          1940        Visit Information        Provider Department      5/28/2018 10:00 AM  INFUSION CHAIR 20 [facility]        Today's Diagnoses     Sepsis due to group B Streptococcus (H)    -  1    Sepsis (H)        S/P mitral valve replacement           Follow-ups after your visit        Your next 10 appointments already scheduled     May 28, 2018 10:00 AM CDT   Level 1 with  INFUSION CHAIR 20   [facility] ([facility])    [facility] [place]  [address]  [place] [address]   [phone]            May 29, 2018  9:00 AM CDT   Level 1 with  INFUSION CHAIR 8   [facility] ([facility])    [facility]  [address]  [facility] [address] [phone]            May 30, 2018  9:00 AM CDT   Level 1 with  INFUSION CHAIR 6   [facility] ([facility])    [facility]  [address]  [facility] [id number] [phone]            May 31, 2018  9:00 AM CDT   Level 1 with  INFUSION CHAIR 7   [facility] ([facility])    [facility]  [address]  [facility] [id number] [phone]            May 31, 2018  1:10 PM CDT   Office Visit with Tu Reyes MD   [facility] [place] [facility]    [address] [place] [address]  [address]  [place] [address] [phone]           Bring a current list of meds and any records pertaining to this visit. For Physicals, please bring immunization records and any forms needing to be filled out. Please arrive 10 minutes early to complete paperwork.             May 31, 2018  1:30 PM CDT   Anticoagulation Visit with EA ANTICOAGULATION CLINIC   [facility] [place] ([facility])    [address]  [address]  [place] [address] [phone]            Jun 01, 2018  9:00 AM CDT   Level 1 with RH INFUSION CHAIR 7   [facility] ([facility] [place] [facility])    [facility]  [address]  [place] [address] [phone]            Jul 12, 2018  7:45 AM CDT   LAB with RU LAB   [facility] AT [place] ([facility])    [address]  [facility] [address] [phone]           Please do not eat 10-12 hours before your appointment if you are coming in fasting for labs on lipids, cholesterol, or glucose (sugar). This does not apply to pregnant women. Water, hot tea and black coffee (with nothing added) are okay. Do not drink other fluids, diet soda or chew gum.            Jul 12, 2018  9:00 AM CDT   Return Visit with Calderon Santo MD   [facility] ([facility])    [address]  [facility] [address] [phone]            Oct 19, 2018  9:00 AM CDT   Return Sleep Patient with Isrrael Dobbins MD   [facility] ([facility])    [address]  [facility] [address] [phone]              Who to contact     If you have questions or need follow up information about today's clinic visit or your schedule please contact [facility] CLINIC AND INFUSION CENTER directly at [phone].  Normal or non-critical lab and imaging results will be communicated to you by MyChart, letter or phone within 4 business days after the clinic has received the results. If you do not hear from us within 7 days, please contact the clinic through MyChart or phone. If you have a critical or abnormal lab result, we  "will notify you by phone as soon as possible.  Submit refill requests through [employer] or call your pharmacy and they will forward the refill request to us. Please allow 3 business days for your refill to be completed.          Additional Information About Your Visit        [employer] Information     [employer] lets you send messages to your doctor, view your test results, renew your prescriptions, schedule appointments and more. To sign up, go to [email or web address] . Click on \"Log in\" on the left side of the screen, which will take you to the Welcome page. Then click on \"Sign up Now\" on the right side of the page.     You will be asked to enter the access code listed below, as well as some personal information. Please follow the directions to create your username and password.     Your access code is: [email or web address]  Expires: 2018  8:30 AM     Your access code will  in 90 days. If you need help or a new code, please call your [place] clinic or [phone].        Care EveryWhere ID     This is your Care EveryWhere ID. This could be used by other organizations to access your [place] medical records  [id number]        Your Vitals Were     Pulse Temperature Respirations             66 97.5  F (36.4  C) (Oral) 16          Blood Pressure from Last 3 Encounters:   18 141/75   18 132/69   18 135/83    Weight from Last 3 Encounters:   18 100.2 kg (220 lb 14.4 oz)   18 102.5 kg (226 lb)   17 99.8 kg (220 lb)              Today, you had the following     No orders found for display       Primary Care Provider Office Phone # Fax #    Tu Reyes [phone] [address] [place] [address]        Equal Access to Services     [facility] : Josi Wu, waswethada emelyqadaha, qaybta kaalmarenetta rm, denny goldberg. [facility] [phone].    ATENCIÓN: Si carlyn james, tiene a brown disposición " servicios gratuitos de asistencia lingüística. Rebecca zurita [phone].    We comply with applicable federal civil rights laws and [place] laws. We do not discriminate on the basis of race, color, national origin, age, disability, sex, sexual orientation, or gender identity.            Thank you!     Thank you for choosing [facility]  for your care. Our goal is always to provide you with excellent care. Hearing back from our patients is one way we can continue to improve our services. Please take a few minutes to complete the written survey that you may receive in the mail after your visit with us. Thank you!             Your Updated Medication List - Protect others around you: Learn how to safely use, store and throw away your medicines at www.disposemymeds.org.          This list is accurate as of 5/28/18  9:54 AM.  Always use your most recent med list.                   Brand Name Dispense Instructions for use Diagnosis    ASPIRIN NOT PRESCRIBED    INTENTIONAL    0 each    Please choose reason not prescribed, below    Long-term (current) use of anticoagulants, S/P aortic valve replacement       betamethasone valerate 0.1 % lotion    VALISONE    60 mL    APPLY TOPICALLY TWICE DAILY PRN    Eczema, unspecified type       cefTRIAXone 1 GM vial    ROCEPHIN    600 mL    Inject 2 g (2,000 mg) into the vein daily for 10 days CBC with differential, creatinine, SGOT weekly while on this medication to be faxed to Dr. Leija office.    Sepsis due to group B Streptococcus (H)       ezetimibe 10 MG tablet    ZETIA    90 tablet    Take 1 tablet (10 mg) by mouth daily    Mixed hyperlipidemia       fluocinonide 0.05 % ointment    LIDEX    60 g    2- 30 gram tubes.  Apply twice a day as needed.    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)       metoprolol tartrate 25 MG tablet    LOPRESSOR    180 tablet    Take 1 tablet (25 mg) by mouth 2 times daily    Coronary artery disease involving coronary bypass graft 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       rosuvastatin 40 MG tablet    CRESTOR    90 tablet    Take 1 tablet (40 mg) by mouth daily    Hyperlipidemia, unspecified hyperlipidemia type       tamsulosin 0.4 MG capsule    FLOMAX    90 capsule    Take 1 capsule (0.4 mg) by mouth daily    Urinary retention       warfarin 5 MG tablet    COUMADIN    135 tablet    Take 1 1/2 tablets (7.5 mg) by mouth TWTFSS; 1 tablet (5 mg) on Mondays OR as directed by INR Clinic    Long-term (current) use of anticoagulants, S/P aortic valve replacement

## 2018-05-29 ENCOUNTER — INFUSION THERAPY VISIT (OUTPATIENT)
Dept: INFUSION THERAPY | Facility: CLINIC | Age: 78
End: 2018-05-29
Attending: INTERNAL MEDICINE
Payer: MEDICARE

## 2018-05-29 ENCOUNTER — HOSPITAL ENCOUNTER (OUTPATIENT)
Facility: CLINIC | Age: 78
Setting detail: SPECIMEN
Discharge: HOME | End: 2018-05-29
Attending: INTERNAL MEDICINE | Admitting: INTERNAL MEDICINE
Payer: MEDICARE

## 2018-05-29 VITALS
OXYGEN SATURATION: 97 % | RESPIRATION RATE: 18 BRPM | TEMPERATURE: 98.1 F | SYSTOLIC BLOOD PRESSURE: 117 MMHG | DIASTOLIC BLOOD PRESSURE: 63 MMHG | HEART RATE: 62 BPM

## 2018-05-29 DIAGNOSIS — A40.1 SEPSIS DUE TO GROUP B STREPTOCOCCUS (H): Primary | ICD-10-CM

## 2018-05-29 DIAGNOSIS — A41.9 SEPSIS (H): ICD-10-CM

## 2018-05-29 DIAGNOSIS — Z95.2 S/P MITRAL VALVE REPLACEMENT: ICD-10-CM

## 2018-05-29 LAB
AST SERPL W P-5'-P-CCNC: 37 U/L (ref 0–45)
BASOPHILS # BLD AUTO: 0.1 10E9/L (ref 0–0.2)
BASOPHILS NFR BLD AUTO: 0.8 %
CREAT SERPL-MCNC: 0.89 MG/DL (ref 0.66–1.25)
DIFFERENTIAL METHOD BLD: ABNORMAL
EOSINOPHIL # BLD AUTO: 0.1 10E9/L (ref 0–0.7)
EOSINOPHIL NFR BLD AUTO: 2.2 %
ERYTHROCYTE [DISTWIDTH] IN BLOOD BY AUTOMATED COUNT: 13.2 % (ref 10–15)
GFR SERPL CREATININE-BSD FRML MDRD: 83 ML/MIN/1.7M2
HCT VFR BLD AUTO: 40.8 % (ref 40–53)
HGB BLD-MCNC: 13 G/DL (ref 13.3–17.7)
IMM GRANULOCYTES # BLD: 0.1 10E9/L (ref 0–0.4)
IMM GRANULOCYTES NFR BLD: 0.8 %
LYMPHOCYTES # BLD AUTO: 1.2 10E9/L (ref 0.8–5.3)
LYMPHOCYTES NFR BLD AUTO: 19.7 %
MCH RBC QN AUTO: 29.8 PG (ref 26.5–33)
MCHC RBC AUTO-ENTMCNC: 31.9 G/DL (ref 31.5–36.5)
MCV RBC AUTO: 94 FL (ref 78–100)
MONOCYTES # BLD AUTO: 0.7 10E9/L (ref 0–1.3)
MONOCYTES NFR BLD AUTO: 10.4 %
NEUTROPHILS # BLD AUTO: 4.1 10E9/L (ref 1.6–8.3)
NEUTROPHILS NFR BLD AUTO: 66.1 %
NRBC # BLD AUTO: 0 10*3/UL
NRBC BLD AUTO-RTO: 0 /100
PLATELET # BLD AUTO: 303 10E9/L (ref 150–450)
RBC # BLD AUTO: 4.36 10E12/L (ref 4.4–5.9)
WBC # BLD AUTO: 6.2 10E9/L (ref 4–11)

## 2018-05-29 PROCEDURE — 25000128 H RX IP 250 OP 636: Performed by: INTERNAL MEDICINE

## 2018-05-29 PROCEDURE — 82565 ASSAY OF CREATININE: CPT | Performed by: INTERNAL MEDICINE

## 2018-05-29 PROCEDURE — 27210995 ZZH RX 272: Performed by: INTERNAL MEDICINE

## 2018-05-29 PROCEDURE — 96374 THER/PROPH/DIAG INJ IV PUSH: CPT

## 2018-05-29 PROCEDURE — 84450 TRANSFERASE (AST) (SGOT): CPT | Performed by: INTERNAL MEDICINE

## 2018-05-29 PROCEDURE — 85025 COMPLETE CBC W/AUTO DIFF WBC: CPT | Performed by: INTERNAL MEDICINE

## 2018-05-29 RX ORDER — CEFTRIAXONE SODIUM 2 G
2 VIAL (EA) INJECTION ONCE
Status: COMPLETED | OUTPATIENT
Start: 2018-05-29 | End: 2018-05-29

## 2018-05-29 RX ORDER — CEFTRIAXONE SODIUM 2 G
2 VIAL (EA) INJECTION ONCE
Status: CANCELLED | OUTPATIENT
Start: 2018-05-29 | End: 2018-05-29

## 2018-05-29 RX ADMIN — CEFTRIAXONE SODIUM 2 G: 2 INJECTION, POWDER, FOR SOLUTION INTRAMUSCULAR; INTRAVENOUS at 09:36

## 2018-05-29 ASSESSMENT — PAIN SCALES - GENERAL: PAINLEVEL: NO PAIN (0)

## 2018-05-29 NOTE — MR AVS SNAPSHOT
After Visit Summary   5/29/2018    Fausto Farr    MRN: [id number]           Patient Information     Date Of Birth          1940        Visit Information        Provider Department      5/29/2018 9:00 AM RH INFUSION CHAIR 8  Infusion Services        Today's Diagnoses     Sepsis due to group B Streptococcus (H)    -  1    Sepsis (H)        S/P mitral valve replacement           Follow-ups after your visit        Your next 10 appointments already scheduled     May 30, 2018  9:00 AM CDT   Level 1 with RH INFUSION CHAIR 6    Infusion Services ([facility])    [facility]  [address]  [place] [address]   [phone]            May 31, 2018  9:00 AM CDT   Level 1 with  INFUSION CHAIR 7    Infusion Services ([facility])    [facility]  [address]  [place] [address]   [phone]            May 31, 2018  1:10 PM CDT   Office Visit with Tu Reyes MD   [facility] ([facility])    [address] [place] [address]  [address]  [place] [address]   [phone]           Bring a current list of meds and any records pertaining to this visit. For Physicals, please bring immunization records and any forms needing to be filled out. Please arrive 10 minutes early to complete paperwork.            May 31, 2018  1:30 PM CDT   Anticoagulation Visit with [facility] ANTICOAGULATION CLINIC   [facility] ([facility])    [address] [place] [address]  [address]  [place] [address]   [phone]            Jun 01, 2018  9:00 AM CDT   Level 1 with  INFUSION CHAIR 7    Infusion Services ([facility])    [facility]  [address]  [place] [address]   [phone]            Jul 12, 2018  7:45 AM  CDT   LAB with RU LAB   [facility] ([facility])    [address]  [facility] [id number] [phone]           Please do not eat 10-12 hours before your appointment if you are coming in fasting for labs on lipids, cholesterol, or glucose (sugar). This does not apply to pregnant women. Water, hot tea and black coffee (with nothing added) are okay. Do not drink other fluids, diet soda or chew gum.            Jul 12, 2018  9:00 AM CDT   Return Visit with Calderon Santo MD   [facility] ([facility])    [address]  [place] [address] [phone]            Oct 19, 2018  9:00 AM CDT   Return Sleep Patient with Isrrael Dobbins MD   [facility] ([facility])    [address]  [place] [id number] [phone]              Who to contact     If you have questions or need follow up information about today's clinic visit or your schedule please contact [facility] INFUSION SERVICES directly at [phone].  Normal or non-critical lab and imaging results will be communicated to you by MyChart, letter or phone within 4 business days after the clinic has received the results. If you do not hear from us within 7 days, please contact the clinic through MyChart or phone. If you have a critical or abnormal lab result, we will notify you by phone as soon as possible.  Submit refill requests through [employer] or call your pharmacy and they will forward the refill request to us. Please allow 3 business days for your refill to be completed.          Additional Information About Your Visit        Care EveryWhere ID     This is your Care EveryWhere ID. This could be used by other organizations to access your [facility] medical records  [id number]        Your Vitals Were     Pulse Temperature Respirations Pulse  Oximetry          62 98.1  F (36.7  C) 18 97%         Blood Pressure from Last 3 Encounters:   05/29/18 117/63   05/28/18 141/75   05/27/18 132/69    Weight from Last 3 Encounters:   05/22/18 100.2 kg (220 lb 14.4 oz)   05/08/18 102.5 kg (226 lb)   11/03/17 99.8 kg (220 lb)              We Performed the Following     AST     CBC with platelets differential     Creatinine        Primary Care Provider Office Phone # Fax #    Tu Reyes [phone] [address] [place] [address]        Equal Access to Services     [facility]: Hadii aad ady hadgissel Wu, waaxda john, qaybta kaalmada sujey, denny blake . So [facility] [phone].    ATENCIÓN: Si habla español, tiene a brown disposición servicios gratuitos de asistencia lingüística. [facility] [phone].    We comply with applicable federal civil rights laws and [place] laws. We do not discriminate on the basis of race, color, national origin, age, disability, sex, sexual orientation, or gender identity.            Thank you!     Thank you for choosing [facility]  for your care. Our goal is always to provide you with excellent care. Hearing back from our patients is one way we can continue to improve our services. Please take a few minutes to complete the written survey that you may receive in the mail after your visit with us. Thank you!             Your Updated Medication List - Protect others around you: Learn how to safely use, store and throw away your medicines at www.disposemymeds.org.          This list is accurate as of 5/29/18 10:16 AM.  Always use your most recent med list.                   Brand Name Dispense Instructions for use Diagnosis    ASPIRIN NOT PRESCRIBED    INTENTIONAL    0 each    Please choose reason not prescribed, below    Long-term (current) use of anticoagulants, S/P aortic valve replacement       betamethasone valerate 0.1  % lotion    VALISONE    60 mL    APPLY TOPICALLY TWICE DAILY PRN    Eczema, unspecified type       cefTRIAXone 1 GM vial    ROCEPHIN    600 mL    Inject 2 g (2,000 mg) into the vein daily for 10 days CBC with differential, creatinine, SGOT weekly while on this medication to be faxed to Dr. Leija office.    Sepsis due to group B Streptococcus (H)       ezetimibe 10 MG tablet    ZETIA    90 tablet    Take 1 tablet (10 mg) by mouth daily    Mixed hyperlipidemia       fluocinonide 0.05 % ointment    LIDEX    60 g    2- 30 gram tubes.  Apply twice a day as needed.    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)       metoprolol tartrate 25 MG tablet    LOPRESSOR    180 tablet    Take 1 tablet (25 mg) by mouth 2 times daily    Coronary artery disease involving coronary bypass graft 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       rosuvastatin 40 MG tablet    CRESTOR    90 tablet    Take 1 tablet (40 mg) by mouth daily    Hyperlipidemia, unspecified hyperlipidemia type       tamsulosin 0.4 MG capsule    FLOMAX    90 capsule    Take 1 capsule (0.4 mg) by mouth daily    Urinary retention       warfarin 5 MG tablet    COUMADIN    135 tablet    Take 1 1/2 tablets (7.5 mg) by mouth TWTFSS; 1 tablet (5 mg) on Mondays OR as directed by INR Clinic    Long-term (current) use of anticoagulants, S/P aortic valve replacement

## 2018-05-29 NOTE — PROGRESS NOTES
Infusion Nursing Note:  Fausto Farr presents today for Rocpehin.    Patient seen by provider today: No   present during visit today: Not Applicable.    Note: Continues with diarrhea 3-4x/day.  Taking around 3 Imodium per day.  Stool is not watery but it is loose.  Denies other SE from Rocephin.    Intravenous Access:  Lab draw site LAC, Needle type butterfly, Gauge 23.  Labs drawn without difficulty.  Midline.    Treatment Conditions:  Lab Results   Component Value Date    HGB 13.0 05/29/2018     Lab Results   Component Value Date    WBC 6.2 05/29/2018      Lab Results   Component Value Date    ANEU 4.1 05/29/2018     Lab Results   Component Value Date     05/29/2018          Post Infusion Assessment:  Patient tolerated infusion without incident.  Blood return noted pre and post infusion.  Site patent and intact, free from redness, edema or discomfort.  No evidence of extravasations.    Discharge Plan:   Discharge instructions reviewed with: Patient.  Patient discharged in stable condition accompanied by: self.  Departure Mode: Ambulatory.  Next infusion scheduled for 5/30/18.    RICKY DE LA TORRE RN

## 2018-05-30 ENCOUNTER — MEDICAL CORRESPONDENCE (OUTPATIENT)
Dept: HEALTH INFORMATION MANAGEMENT | Facility: CLINIC | Age: 78
End: 2018-05-30

## 2018-05-30 ENCOUNTER — INFUSION THERAPY VISIT (OUTPATIENT)
Dept: INFUSION THERAPY | Facility: CLINIC | Age: 78
End: 2018-05-30
Attending: INTERNAL MEDICINE
Payer: MEDICARE

## 2018-05-30 VITALS — TEMPERATURE: 98 F | DIASTOLIC BLOOD PRESSURE: 80 MMHG | SYSTOLIC BLOOD PRESSURE: 125 MMHG | RESPIRATION RATE: 18 BRPM

## 2018-05-30 DIAGNOSIS — A41.9 SEPSIS (H): ICD-10-CM

## 2018-05-30 DIAGNOSIS — A40.1 SEPSIS DUE TO GROUP B STREPTOCOCCUS (H): Primary | ICD-10-CM

## 2018-05-30 DIAGNOSIS — Z95.2 S/P MITRAL VALVE REPLACEMENT: ICD-10-CM

## 2018-05-30 PROCEDURE — 96374 THER/PROPH/DIAG INJ IV PUSH: CPT

## 2018-05-30 PROCEDURE — 25000128 H RX IP 250 OP 636: Performed by: INTERNAL MEDICINE

## 2018-05-30 PROCEDURE — 27210995 ZZH RX 272: Performed by: INTERNAL MEDICINE

## 2018-05-30 RX ORDER — CEFTRIAXONE SODIUM 2 G
2 VIAL (EA) INJECTION ONCE
Status: COMPLETED | OUTPATIENT
Start: 2018-05-30 | End: 2018-05-30

## 2018-05-30 RX ORDER — CEFTRIAXONE SODIUM 2 G
2 VIAL (EA) INJECTION ONCE
Status: CANCELLED | OUTPATIENT
Start: 2018-05-30 | End: 2018-05-30

## 2018-05-30 RX ADMIN — WATER 2 G: 1 INJECTION INTRAMUSCULAR; INTRAVENOUS; SUBCUTANEOUS at 09:11

## 2018-05-30 NOTE — PROGRESS NOTES
SUBJECTIVE:   Fausto Farr is a 78 year old male who presents to clinic today for the following health issues:          Hospital Follow-up Visit:    Hospital/Nursing Home/IP Rehab Facility: [facility]  Date of Admission: 05/17/2018  Date of Discharge: 05/22/2018  Reason(s) for Admission: Sepsis             Problems taking medications regularly:  None       Medication changes since discharge: Was told to stop low dose aspirin        Problems adhering to non-medication therapy:  None    Summary of hospitalization:  [facility] discharge summary reviewed  Diagnostic Tests/Treatments reviewed.  Follow up needed: none  Other Healthcare Providers Involved in Patient s Care:         None  Update since discharge: improved.     Post Discharge Medication Reconciliation: discharge medications reconciled, continue medications without change.  Plan of care communicated with patient     Coding guidelines for this visit:  Type of Medical   Decision Making Face-to-Face Visit       within 7 Days of discharge Face-to-Face Visit        within 14 days of discharge   Moderate Complexity [id number]   High Complexity 76195 15136          Sepsis due to Group B strep,  Left lower extremity cellulitis  - BP on admission was soft. WBCs 22.1, fever 101.5, la 2.1  - Started on Zosyn/Vanco upon admission  - Initially the cause of sepsis was not clear but on hospital day 2 noted to have LLE erythema and warmth- suggestive of cellulitis; he also had some scabs on his LLE- due to gardening  - US LLE- no DVT  - UA negative, CXR with no infiltrates  - BC from Urgent Care positive for Group B strep  - Repeat daily BCs negative  - Transthoracic echo on 5/18- LVEF- 55-60%, no evidence of vegetations  - RACHAEL 05/21- mechanical aortic and mechanical valves with no vegetations. LVEF 55%.  - Changed the antibiotic regimen to Ceftriaxone on 5/20 to continue for 10 more days.    Followed by ID during the hospital course, left leg  redness and swelling resolved. Has been having some pain in the left hip (was ongoing prior to this). Is planning on going back to ortho to discuss. No fevers or chills.       Coronary artery disease, s/p CABG x2 in 2006,  HFpEF (chronic)- LVEF 55-60%,  PAF,  HTN,  HLP.  - EKG showed atrial fib with PVCs. Troponin x2  negative.    - Continued PTA Toprol XL 25 mg po BID, furosemide 20 mg daily, Zetia, Crestor  - Had a run of Vtach 18 beats on 05/20- asymptomatic      S/P mechanical valve, aortic and mitral:  - Chronically on warfarin. INR goal 2.5-3.5  - Required IV heparin bridge this stay due to subtherapeutic INR       Benign prostatic hypertrophy:  - Continued PTA tamsulosin       Ulcerative colitis:    - Continued PTA mesalamine.       Problem list and histories reviewed & adjusted, as indicated.  Additional history: as documented    Patient Active Problem List   Diagnosis     Rotator cuff (capsule) sprain     Somatic dysfunction of pelvic region     Contact dermatitis and other eczema, due to unspecified cause     Ulcerative colitis (H)     Atrial fibrillation (H)     Other specified anemias     Gastric ulcer     Bilateral low back pain with left-sided sciatica     Nonallopathic lesion of lumbar region     Nonallopathic lesion of sacral region     Diaphragmatic hernia     Abdominal pain, epigastric     Malaise and fatigue     Nocturia     Nonallopathic lesion of cervical region     Nonallopathic lesion of thoracic region     Cellulitis and abscess     Malignant neoplasm of prostate (H)     Abdominal aortic aneurysm (H)     Nonallopathic lesion of rib cage     Vitamin D deficiency disease     Anemia relative at 12.5 in 8-13      Essential hypertension, benign     Hyperlipidemia LDL goal <100     Prostate cancer (H)     Glucose intolerance (impaired glucose tolerance)     Back pain-since 1980's     Sciatica of left side since 2000     Valvular heart disease     Heart murmur     MRSA (methicillin resistant  Staphylococcus aureus)     Health Care Home     Urinary retention     Coronary artery disease     ACP (advance care planning)     AF (atrial fibrillation) (H)     S/P aortic valve replacement     S/P CABG (coronary artery bypass graft)     Stented coronary artery     Mixed hyperlipidemia     PVD (peripheral vascular disease) (H)     Abnormal ECG     Personal history of tobacco use, presenting hazards to health     Spinal stenosis of lumbar region without neurogenic claudication     S/P mitral valve replacement     RBBB with left anterior fascicular block     S/P lumbar spinal fusion     Long-term (current) use of anticoagulants [Z79.01]     Chronic systolic congestive heart failure (H)     Paroxysmal atrial fibrillation (H)     GAGE (obstructive sleep apnea)     Sepsis (H)     Sepsis due to group B Streptococcus (H)     Past Surgical History:   Procedure Laterality Date     AORTIC VALVE REPLACEMENT  1/3/06    redo AVR SJM 21mm and SJM MVR 27mm in 2013SJM 21([id number]):AVR, SJM 27 :MVR-     ARTHROPLASTY KNEE      right knee     BACK SURGERY  Oct 2015    Fusion L4-5, laminectomy L2, L3     BYPASS GRAFT ARTERY CORONARY  10/2013    reimplantation of radial artery graft to RCA     C CABG, VEIN, TWO  1/3/06    Left radial to RCA, LIMA to LAD (RA to RCA reimplanted at time of 2013 surg)     CARDIAC CATHERIZATION  11/2005    Stent placed to RCA     CARDIAC CATHERIZATION  04/2013    Occluded RCA, patent LIMA to LAD and radial graft to PDA     CARPAL TUNNEL RELEASE RT/LT  1994     COLONOSCOPY  8-22-11     CYSTOSCOPY FLEXIBLE  10/16/2013    Procedure: CYSTOSCOPY FLEXIBLE;  FLEXIBLE CYSTOSCOPY / DILATION OF URETHRA / INSERTION OF LESLIE;  Surgeon: Cooper Wallace MD;  Location: [facility] OR     ENDOVASCULAR REPAIR ANEURYSM ABDOMINAL AORTA  2006     ENDOVASCULAR REPAIR, INFRARENAL ABDOMINAL AORTIC ANEURYSM/DISSECTION; MODULAR BIFURCATED PROSTHESIS      AAA repair endovascular     ENT SURGERY       GENITOURINARY SURGERY  6/16/08     radioactive seeding     HEAD & NECK SURGERY  1997    vocal cord polypectomy     KNEE SURGERY  2001 Right knee arthroscopy     OPTICAL TRACKING SYSTEM FUSION SPINE POSTERIOR LUMBAR THREE+ LEVELS N/A 10/29/2015    Procedure: OPTICAL TRACKING SYSTEM FUSION SPINE POSTERIOR LUMBAR THREE+ LEVELS;  Surgeon: Walt Garcia MD;  Location: [facility] OR     PROSTATE SURGERY  06/16/2008 Radioactive seeding     PROSTATE SURGERY      radioactive seeding 6/16/08     REPAIR ANEURYSM ABDOMINAL AORTA  06/08     REPAIR VALVE MITRAL  10/16/2013    SJM 21([id number]):AVR, SJM 27 :MVRProcedure: REPAIR VALVE MITRAL;  REDO STERNOTOMY/REDO AORTIC VALVE REPLACEMENT/ MITRAL VALVE REPLACEMENT/REIMPLANTATION OF RIGHT CORONARY ARTERY BYPASS WITH RACHAEL ( ON PUMP);  Surgeon: Viet Singh MD;  Location: [facility] OR     REPLACE VALVE AORTIC  10/16/2013    Procedure: REPLACE VALVE AORTIC;;  Surgeon: Viet Singh MD;  Location: [facility] OR     SURGERY GENERAL IP CONSULT  05/2008 Excision aneurysm abdominal aorta     SURGERY GENERAL IP CONSULT  1997 Vocal cord polypectomy     VASCULAR SURGERY  1968, 1993     varicose vein stripping       Social History   Substance Use Topics     Smoking status: Former Smoker     Packs/day: 1.00     Years: 40.00     Quit date: 10/23/2002     Smokeless tobacco: Never Used     Alcohol use Yes      Comment: a couple beers per week     Family History   Problem Relation Age of Onset     Coronary Artery Disease Father      CABG     HEART DISEASE Father      Pacemaker     Other Cancer Daughter      HEART DISEASE Brother      Other - See Comments Grandchild            Reviewed and updated as needed this visit by clinical staff       Reviewed and updated as needed this visit by Provider         ROS:  Constitutional, HEENT, cardiovascular, pulmonary, GI, , musculoskeletal, neuro, skin, endocrine and psych systems are negative, except as otherwise noted.    OBJECTIVE:     /72 (BP Location: Left arm, Cuff Size:  "Adult Regular)  Pulse 71  Temp 97.6  F (36.4  C) (Oral)  Ht 5' 5.5\" (1.664 m)  Wt 215 lb (97.5 kg)  SpO2 94%  BMI 35.23 kg/m2  Body mass index is 35.23 kg/(m^2).  GENERAL: healthy, alert and no distress  NECK: no adenopathy, no asymmetry, masses, or scars and thyroid normal to palpation  RESP: lungs clear to auscultation - no rales, rhonchi or wheezes  CV: irregularly irregular mechanical heart sound  ABDOMEN: soft, nontender, no hepatosplenomegaly, no masses and bowel sounds normal  MS: no gross musculoskeletal defects noted, no edema  SKIN: no suspicious lesions or rashes  NEURO: Normal strength and tone, mentation intact and speech normal  PSYCH: mentation appears normal, affect normal/bright    Diagnostic Test Results:  Results for orders placed or performed in visit on 05/31/18   Vitamin D Deficiency   Result Value Ref Range    Vitamin D Deficiency screening 40 20 - 75 ug/L   CBC with platelets differential   Result Value Ref Range    WBC 10.1 4.0 - 11.0 10e9/L    RBC Count 4.68 4.4 - 5.9 10e12/L    Hemoglobin 14.0 13.3 - 17.7 g/dL    Hematocrit 43.2 40.0 - 53.0 %    MCV 92 78 - 100 fl    MCH 29.9 26.5 - 33.0 pg    MCHC 32.4 31.5 - 36.5 g/dL    RDW 13.3 10.0 - 15.0 %    Platelet Count 304 150 - 450 10e9/L    Diff Method Automated Method     % Neutrophils 68.7 %    % Lymphocytes 18.8 %    % Monocytes 10.5 %    % Eosinophils 1.4 %    % Basophils 0.6 %    Absolute Neutrophil 6.9 1.6 - 8.3 10e9/L    Absolute Lymphocytes 1.9 0.8 - 5.3 10e9/L    Absolute Monocytes 1.1 0.0 - 1.3 10e9/L    Absolute Eosinophils 0.1 0.0 - 0.7 10e9/L    Absolute Basophils 0.1 0.0 - 0.2 10e9/L   Basic metabolic panel   Result Value Ref Range    Sodium 138 133 - 144 mmol/L    Potassium 4.6 3.4 - 5.3 mmol/L    Chloride 102 94 - 109 mmol/L    Carbon Dioxide 27 20 - 32 mmol/L    Anion Gap 9 3 - 14 mmol/L    Glucose 96 70 - 99 mg/dL    Urea Nitrogen 18 7 - 30 mg/dL    Creatinine 1.16 0.66 - 1.25 mg/dL    GFR Estimate 61 >60 mL/min/1.7m2 "    GFR Estimate If Black 74 >60 mL/min/1.7m2    Calcium 9.5 8.5 - 10.1 mg/dL       ASSESSMENT/PLAN:       ICD-10-CM    1. Vitamin D deficiency E55.9 Potassium Chloride ER 20 MEQ TBCR     Vitamin D Deficiency     CBC with platelets differential     Basic metabolic panel     ACE/ARB/ARNI NOT PRESCRIBED, INTENTIONAL,   2. Left leg cellulitis L03.116 Potassium Chloride ER 20 MEQ TBCR     Vitamin D Deficiency     CBC with platelets differential     Basic metabolic panel     ACE/ARB/ARNI NOT PRESCRIBED, INTENTIONAL,   3. Essential hypertension, benign I10 Potassium Chloride ER 20 MEQ TBCR     Vitamin D Deficiency     CBC with platelets differential     Basic metabolic panel     ACE/ARB/ARNI NOT PRESCRIBED, INTENTIONAL,   4. Atrial fibrillation, unspecified type (H) I48.91 Potassium Chloride ER 20 MEQ TBCR     Vitamin D Deficiency     CBC with platelets differential     Basic metabolic panel     ACE/ARB/ARNI NOT PRESCRIBED, INTENTIONAL,   Consideration of the left hip for infection if having ongoing symptoms, consider repeat labs with sed rate, CRP, blood cultures    Patient Instructions   1) Use yogurt to help with diarrhea- let me know if not improving    2) Labs downstairs today    3) Try cutting back potassium back to half the dose to see if this helps    4) Continue the warfarin for now    Let me know if things change.    MD Tu Mckenna MD, MD  [facility] [place]

## 2018-05-30 NOTE — PROGRESS NOTES
Infusion Nursing Note:  Fausto Farr presents today for rocephin.    Patient seen by provider today: No   present during visit today: Not Applicable.    Note: Pt states he is seeing primary MD tomorrow.  Dr Leija' office called for order to remove IV after Fridays dose.  Addend- Orders obtained to give dose tomorrow 5/31 and pull Midline from Dr Floyd/ Marcus ANDERSON/Quyen Farris RN  Fridays dose DCd    Intravenous Access:  Midline.    Treatment Conditions:  Not Applicable.      Post Infusion Assessment:  Patient tolerated infusion without incident.  Blood return noted pre and post infusion.  Site patent and intact, free from redness, edema or discomfort.    Discharge Plan:   Discharge instructions reviewed with: Patient.  Patient and/or family verbalized understanding of discharge instructions and all questions answered.  Patient will return tomorrow for next appointment.   Patient discharged in stable condition accompanied by: self.  Departure Mode: Ambulatory.    Quyen Farris RN

## 2018-05-30 NOTE — MR AVS SNAPSHOT
After Visit Summary   5/30/2018    Fausto Farr    MRN: [id number]           Patient Information     Date Of Birth          1940        Visit Information        Provider Department      5/30/2018 9:00 AM  INFUSION CHAIR 6 [facility]        Today's Diagnoses     Sepsis due to group B Streptococcus (H)    -  1    Sepsis (H)        S/P mitral valve replacement           Follow-ups after your visit        Your next 10 appointments already scheduled     May 31, 2018  9:00 AM CDT   Level 1 with  INFUSION CHAIR 7   [facility] ([facility])    [facility]  [address]  [facility] [address]   [phone]            May 31, 2018  1:10 PM CDT   Office Visit with Tu Reyes MD   [facility] ([facility])    [address]  [address]  [place] [address] [phone]           Bring a current list of meds and any records pertaining to this visit. For Physicals, please bring immunization records and any forms needing to be filled out. Please arrive 10 minutes early to complete paperwork.            May 31, 2018  1:30 PM CDT   Anticoagulation Visit with EA ANTICOAGULATION CLINIC   [facility] [place] ([facility])    [address]  [address]  [facility] [address] [phone]            Jun 01, 2018  9:00 AM CDT   Level 1 with  INFUSION CHAIR 7   [facility] ([facility])    [facility]  [address]  [place] [address]   [phone]            Jul 12, 2018  7:45 AM CDT   LAB with RU LAB   [facility] AT [place] ([facility])    [address]  [facility] [address]   [phone]           Please do not eat 10-12 hours before your appointment if you are coming  in fasting for labs on lipids, cholesterol, or glucose (sugar). This does not apply to pregnant women. Water, hot tea and black coffee (with nothing added) are okay. Do not drink other fluids, diet soda or chew gum.            Jul 12, 2018  9:00 AM CDT   Return Visit with Calderon Santo MD   [facility] ([facility])    [address]  [facility] [address] [phone]            Oct 19, 2018  9:00 AM CDT   Return Sleep Patient with Isrrael Dobbins MD   [facility] ([facility])    [address]  [facility] [address] [phone]              Who to contact     If you have questions or need follow up information about today's clinic visit or your schedule please contact [facility] directly at [phone].  Normal or non-critical lab and imaging results will be communicated to you by MyChart, letter or phone within 4 business days after the clinic has received the results. If you do not hear from us within 7 days, please contact the clinic through Airizuhart or phone. If you have a critical or abnormal lab result, we will notify you by phone as soon as possible.  Submit refill requests through [employer] or call your pharmacy and they will forward the refill request to us. Please allow 3 business days for your refill to be completed.          Additional Information About Your Visit        Care EveryWhere ID     This is your Care EveryWhere ID. This could be used by other organizations to access your [facility] medical records  [id number]        Your Vitals Were     Temperature Respirations                98  F (36.7  C) (Tympanic) 18           Blood Pressure from Last 3 Encounters:   05/30/18 125/80   05/29/18 117/63   05/28/18 141/75    Weight from Last 3 Encounters:   05/22/18 100.2 kg (220 lb 14.4 oz)   05/08/18 102.5 kg (226 lb)   11/03/17 99.8 kg  (220 lb)              Today, you had the following     No orders found for display       Primary Care Provider Office Phone # Fax #    Tu Reyes [phone]       [facility] [place] [address]        Equal Access to Services     SAMANTHALOU LAKSHMI : Hadii aad ku haddavido Soomaali, waaxda luqadaha, qaybta kaalmada adeegyada, denny gooden adediaz platt lasrinivasaniyah . So [facility] [phone].    ATENCIÓN: Si habla español, tiene a brown disposición servicios gratuitos de asistencia lingüística. Llame al [phone].    We comply with applicable federal civil rights laws and [place] laws. We do not discriminate on the basis of race, color, national origin, age, disability, sex, sexual orientation, or gender identity.            Thank you!     Thank you for choosing [facility] INF[facility]ION [facility]  for your care. Our goal is always to provide you with excellent care. Hearing back from our patients is one way we can continue to improve our services. Please take a few minutes to complete the written survey that you may receive in the mail after your visit with us. Thank you!             Your Updated Medication List - Protect others around you: Learn how to safely use, store and throw away your medicines at www.disposemymeds.org.          This list is accurate as of 5/30/18  2:57 PM.  Always use your most recent med list.                   Brand Name Dispense Instructions for use Diagnosis    ASPIRIN NOT PRESCRIBED    INTENTIONAL    0 each    Please choose reason not prescribed, below    Long-term (current) use of anticoagulants, S/P aortic valve replacement       betamethasone valerate 0.1 % lotion    VALISONE    60 mL    APPLY TOPICALLY TWICE DAILY PRN    Eczema, unspecified type       cefTRIAXone 1 GM vial    ROCEPHIN    600 mL    Inject 2 g (2,000 mg) into the vein daily for 10 days CBC with differential, creatinine, SGOT weekly while on this medication to be faxed to   [facility] office.    Sepsis due to group B Streptococcus (H)       ezetimibe 10 MG tablet    ZETIA    90 tablet    Take 1 tablet (10 mg) by mouth daily    Mixed hyperlipidemia       fluocinonide 0.05 % ointment    LIDEX    60 g    2- 30 gram tubes.  Apply twice a day as needed.    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)       metoprolol tartrate 25 MG tablet    LOPRESSOR    180 tablet    Take 1 tablet (25 mg) by mouth 2 times daily    Coronary artery disease involving coronary bypass graft 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       rosuvastatin 40 MG tablet    CRESTOR    90 tablet    Take 1 tablet (40 mg) by mouth daily    Hyperlipidemia, unspecified hyperlipidemia type       tamsulosin 0.4 MG capsule    FLOMAX    90 capsule    Take 1 capsule (0.4 mg) by mouth daily    Urinary retention       warfarin 5 MG tablet    COUMADIN    135 tablet    Take 1 1/2 tablets (7.5 mg) by mouth TWTFSS; 1 tablet (5 mg) on Mondays OR as directed by INR Clinic    Long-term (current) use of anticoagulants, S/P aortic valve replacement

## 2018-05-31 ENCOUNTER — TELEPHONE (OUTPATIENT)
Dept: NURSING | Facility: CLINIC | Age: 78
End: 2018-05-31

## 2018-05-31 ENCOUNTER — ANTICOAGULATION THERAPY VISIT (OUTPATIENT)
Dept: NURSING | Facility: CLINIC | Age: 78
End: 2018-05-31
Payer: MEDICARE

## 2018-05-31 ENCOUNTER — OFFICE VISIT (OUTPATIENT)
Dept: PEDIATRICS | Facility: CLINIC | Age: 78
End: 2018-05-31
Payer: MEDICARE

## 2018-05-31 ENCOUNTER — INFUSION THERAPY VISIT (OUTPATIENT)
Dept: INFUSION THERAPY | Facility: CLINIC | Age: 78
End: 2018-05-31
Attending: INTERNAL MEDICINE
Payer: MEDICARE

## 2018-05-31 VITALS
OXYGEN SATURATION: 95 % | SYSTOLIC BLOOD PRESSURE: 128 MMHG | TEMPERATURE: 96.5 F | HEART RATE: 64 BPM | RESPIRATION RATE: 18 BRPM | DIASTOLIC BLOOD PRESSURE: 79 MMHG

## 2018-05-31 VITALS
DIASTOLIC BLOOD PRESSURE: 72 MMHG | HEIGHT: 66 IN | TEMPERATURE: 97.6 F | WEIGHT: 215 LBS | HEART RATE: 71 BPM | SYSTOLIC BLOOD PRESSURE: 124 MMHG | OXYGEN SATURATION: 94 % | BODY MASS INDEX: 34.55 KG/M2

## 2018-05-31 DIAGNOSIS — A41.9 SEPSIS (H): ICD-10-CM

## 2018-05-31 DIAGNOSIS — Z95.2 S/P MITRAL VALVE REPLACEMENT: ICD-10-CM

## 2018-05-31 DIAGNOSIS — A40.1 SEPSIS DUE TO GROUP B STREPTOCOCCUS (H): Primary | ICD-10-CM

## 2018-05-31 DIAGNOSIS — I48.91 ATRIAL FIBRILLATION, UNSPECIFIED TYPE (H): ICD-10-CM

## 2018-05-31 DIAGNOSIS — Z79.01 LONG-TERM (CURRENT) USE OF ANTICOAGULANTS: ICD-10-CM

## 2018-05-31 DIAGNOSIS — E55.9 VITAMIN D DEFICIENCY: Primary | ICD-10-CM

## 2018-05-31 DIAGNOSIS — I48.91 AF (ATRIAL FIBRILLATION) (H): ICD-10-CM

## 2018-05-31 DIAGNOSIS — I10 ESSENTIAL HYPERTENSION, BENIGN: ICD-10-CM

## 2018-05-31 DIAGNOSIS — L03.116 LEFT LEG CELLULITIS: ICD-10-CM

## 2018-05-31 LAB
BASOPHILS # BLD AUTO: 0.1 10E9/L (ref 0–0.2)
BASOPHILS NFR BLD AUTO: 0.6 %
DIFFERENTIAL METHOD BLD: NORMAL
EOSINOPHIL # BLD AUTO: 0.1 10E9/L (ref 0–0.7)
EOSINOPHIL NFR BLD AUTO: 1.4 %
ERYTHROCYTE [DISTWIDTH] IN BLOOD BY AUTOMATED COUNT: 13.3 % (ref 10–15)
HCT VFR BLD AUTO: 43.2 % (ref 40–53)
HGB BLD-MCNC: 14 G/DL (ref 13.3–17.7)
INR POINT OF CARE: 3.3 (ref 0.86–1.14)
LYMPHOCYTES # BLD AUTO: 1.9 10E9/L (ref 0.8–5.3)
LYMPHOCYTES NFR BLD AUTO: 18.8 %
MCH RBC QN AUTO: 29.9 PG (ref 26.5–33)
MCHC RBC AUTO-ENTMCNC: 32.4 G/DL (ref 31.5–36.5)
MCV RBC AUTO: 92 FL (ref 78–100)
MONOCYTES # BLD AUTO: 1.1 10E9/L (ref 0–1.3)
MONOCYTES NFR BLD AUTO: 10.5 %
NEUTROPHILS # BLD AUTO: 6.9 10E9/L (ref 1.6–8.3)
NEUTROPHILS NFR BLD AUTO: 68.7 %
PLATELET # BLD AUTO: 304 10E9/L (ref 150–450)
RBC # BLD AUTO: 4.68 10E12/L (ref 4.4–5.9)
WBC # BLD AUTO: 10.1 10E9/L (ref 4–11)

## 2018-05-31 PROCEDURE — 99207 ZZC NO CHARGE NURSE ONLY: CPT

## 2018-05-31 PROCEDURE — 36416 COLLJ CAPILLARY BLOOD SPEC: CPT

## 2018-05-31 PROCEDURE — 85025 COMPLETE CBC W/AUTO DIFF WBC: CPT | Performed by: INTERNAL MEDICINE

## 2018-05-31 PROCEDURE — 25000128 H RX IP 250 OP 636: Performed by: INTERNAL MEDICINE

## 2018-05-31 PROCEDURE — 27210995 ZZH RX 272: Performed by: INTERNAL MEDICINE

## 2018-05-31 PROCEDURE — 96374 THER/PROPH/DIAG INJ IV PUSH: CPT

## 2018-05-31 PROCEDURE — 85610 PROTHROMBIN TIME: CPT | Mod: QW

## 2018-05-31 PROCEDURE — 99495 TRANSJ CARE MGMT MOD F2F 14D: CPT | Performed by: INTERNAL MEDICINE

## 2018-05-31 PROCEDURE — 82306 VITAMIN D 25 HYDROXY: CPT | Performed by: INTERNAL MEDICINE

## 2018-05-31 PROCEDURE — 80048 BASIC METABOLIC PNL TOTAL CA: CPT | Performed by: INTERNAL MEDICINE

## 2018-05-31 RX ORDER — POTASSIUM CHLORIDE 1500 MG/1
20 TABLET, EXTENDED RELEASE ORAL DAILY
Qty: 30 TABLET | Refills: 1 | COMMUNITY
Start: 2018-05-31 | End: 2019-05-21

## 2018-05-31 RX ORDER — CEFTRIAXONE SODIUM 2 G
2 VIAL (EA) INJECTION ONCE
Status: CANCELLED | OUTPATIENT
Start: 2018-05-31 | End: 2018-05-31

## 2018-05-31 RX ORDER — CEFTRIAXONE SODIUM 2 G
2 VIAL (EA) INJECTION ONCE
Status: COMPLETED | OUTPATIENT
Start: 2018-05-31 | End: 2018-05-31

## 2018-05-31 RX ADMIN — CEFTRIAXONE SODIUM 2 G: 2 INJECTION, POWDER, FOR SOLUTION INTRAMUSCULAR; INTRAVENOUS at 09:00

## 2018-05-31 NOTE — MR AVS SNAPSHOT
Fausto Farr   5/31/2018 1:30 PM   Anticoagulation Therapy Visit    Description:  78 year old male   Provider:  [facility]   Department:  [facility]           INR as of 5/31/2018     Today's INR 3.3      Anticoagulation Summary as of 5/31/2018     INR goal 2.5-3.5   Today's INR 3.3   Full warfarin instructions 5 mg on Mon; 7.5 mg all other days   Next INR check 7/9/2018    Indications   AF (atrial fibrillation) (H) [I48.91]  S/P mitral valve replacement [Z95.2]  Long-term (current) use of anticoagulants [Z79.01] [Z79.01]         Your next Anticoagulation Clinic appointment(s)     Jul 09, 2018  8:45 AM CDT   Anticoagulation Visit with  ANTICOAGULATION CLINIC   [facility] [place] ([facility])    [address] [place] [address]  [address]  [facility] [address] [phone]              Contact Numbers     [facility]  Please call  [phone] to cancel and/or reschedule your appointment   Please call  [phone] with any problems or questions regarding your therapy.        May 2018 Details    Sun Mon Tue Wed Thu Fri Sat       1               2               3               4               5                 6               7               8               9               10               11               12                 13               14               15               16               17               18               19                 20               21               22               23               24               25               26                 27               28               29               30               31      7.5 mg   See details         Date Details   05/31 This INR check               How to take your warfarin dose     To take:  7.5 mg Take 1.5 of the 5 mg tablets.           June 2018 Details    Sun Mon Tue Wed Thu Fri Sat          1      7.5 mg         2      7.5 mg           3      7.5 mg         4      5 mg         5      7.5 mg         6       7.5 mg         7      7.5 mg         8      7.5 mg         9      7.5 mg           10      7.5 mg         11      5 mg         12      7.5 mg         13      7.5 mg         14      7.5 mg         15      7.5 mg         16      7.5 mg           17      7.5 mg         18      5 mg         19      7.5 mg         20      7.5 mg         21      7.5 mg         22      7.5 mg         23      7.5 mg           24      7.5 mg         25      5 mg         26      7.5 mg         27      7.5 mg         28      7.5 mg         29      7.5 mg         30      7.5 mg          Date Details   No additional details            How to take your warfarin dose     To take:  5 mg Take 1 of the 5 mg tablets.    To take:  7.5 mg Take 1.5 of the 5 mg tablets.           July 2018 Details    Sun Mon Tue Wed Thu Fri Sat     1      7.5 mg         2      5 mg         3      7.5 mg         4      7.5 mg         5      7.5 mg         6      7.5 mg         7      7.5 mg           8      7.5 mg         9            10               11               12               13               14                 15               16               17               18               19               20               21                 22               23               24               25               26               27               28                 29               30               31                    Date Details   No additional details    Date of next INR:  7/9/2018         How to take your warfarin dose     To take:  5 mg Take 1 of the 5 mg tablets.    To take:  7.5 mg Take 1.5 of the 5 mg tablets.

## 2018-05-31 NOTE — PROGRESS NOTES
ANTICOAGULATION FOLLOW-UP CLINIC VISIT    Patient Name:  Fausto Farr  Date:  5/31/2018  Contact Type:  Face to Face    SUBJECTIVE:     Patient Findings     Positives No Problem Findings           OBJECTIVE    INR Protime   Date Value Ref Range Status   05/31/2018 3.3 (A) 0.86 - 1.14 Final       ASSESSMENT / PLAN  INR assessment THER    Recheck INR In: 5 WEEKS    INR Location Clinic      Anticoagulation Summary as of 5/31/2018     INR goal 2.5-3.5   Today's INR 3.3   Warfarin maintenance plan 5 mg (5 mg x 1) on Mon; 7.5 mg (5 mg x 1.5) all other days   Full warfarin instructions 5 mg on Mon; 7.5 mg all other days   Weekly warfarin total 50 mg   Plan last modified Caryn Campos RN (11/9/2017)   Next INR check 7/9/2018   Priority INR   Target end date Indefinite    Indications   AF (atrial fibrillation) (H) [I48.91]  S/P mitral valve replacement [Z95.2]  Long-term (current) use of anticoagulants [Z79.01] [Z79.01]         Anticoagulation Episode Summary     INR check location     Preferred lab     Send INR reminders to [facility]    Comments 5mg tabs - andrade dose // transfer from [facility] // [facility] // CALENDAR      Anticoagulation Care Providers     Provider Role Specialty Phone number    Tu Reyes MD  Internal Medicine [phone]            See the Encounter Report to view Anticoagulation Flowsheet and Dosing Calendar (Go to Encounters tab in chart review, and find the Anticoagulation Therapy Visit)        Caryn Campos RN

## 2018-05-31 NOTE — LETTER
[facility]  [address]  [place] [address]                  [phone]   June 4, 2018    Fausto Farr  [address]  [place] [address]      Dear Fausto,     Here are the results from the recent Labs that we did.     The labs that we did all looked good. I would recommend no changes.     Let me know if you have questions or concerns!     Sincerely,       Tu Reyes MD   Internal Medicine and Pediatrics         Results for orders placed or performed in visit on 05/31/18   Vitamin D Deficiency   Result Value Ref Range    Vitamin D Deficiency screening 40 20 - 75 ug/L   CBC with platelets differential   Result Value Ref Range    WBC 10.1 4.0 - 11.0 10e9/L    RBC Count 4.68 4.4 - 5.9 10e12/L    Hemoglobin 14.0 13.3 - 17.7 g/dL    Hematocrit 43.2 40.0 - 53.0 %    MCV 92 78 - 100 fl    MCH 29.9 26.5 - 33.0 pg    MCHC 32.4 31.5 - 36.5 g/dL    RDW 13.3 10.0 - 15.0 %    Platelet Count 304 150 - 450 10e9/L    Diff Method Automated Method     % Neutrophils 68.7 %    % Lymphocytes 18.8 %    % Monocytes 10.5 %    % Eosinophils 1.4 %    % Basophils 0.6 %    Absolute Neutrophil 6.9 1.6 - 8.3 10e9/L    Absolute Lymphocytes 1.9 0.8 - 5.3 10e9/L    Absolute Monocytes 1.1 0.0 - 1.3 10e9/L    Absolute Eosinophils 0.1 0.0 - 0.7 10e9/L    Absolute Basophils 0.1 0.0 - 0.2 10e9/L   Basic metabolic panel   Result Value Ref Range    Sodium 138 133 - 144 mmol/L    Potassium 4.6 3.4 - 5.3 mmol/L    Chloride 102 94 - 109 mmol/L    Carbon Dioxide 27 20 - 32 mmol/L    Anion Gap 9 3 - 14 mmol/L    Glucose 96 70 - 99 mg/dL    Urea Nitrogen 18 7 - 30 mg/dL    Creatinine 1.16 0.66 - 1.25 mg/dL    GFR Estimate 61 >60 mL/min/1.7m2    GFR Estimate If Black 74 >60 mL/min/1.7m2    Calcium 9.5 8.5 - 10.1 mg/dL

## 2018-05-31 NOTE — PATIENT INSTRUCTIONS
1) Use yogurt to help with diarrhea- let me know if not improving    2) Labs downstairs today    3) Try cutting back potassium back to half the dose to see if this helps    4) Continue the warfarin for now    Let me know if things change.    Tu Reyes MD

## 2018-05-31 NOTE — MR AVS SNAPSHOT
After Visit Summary   5/31/2018    Fausto Farr    MRN: [id number]           Patient Information     Date Of Birth          1940        Visit Information        Provider Department      5/31/2018 9:00 AM  INFUSION CHAIR 7 [facility]        Today's Diagnoses     Sepsis due to group B Streptococcus (H)    -  1    Sepsis (H)        S/P mitral valve replacement           Follow-ups after your visit        Your next 10 appointments already scheduled     May 31, 2018  1:10 PM CDT   Office Visit with Tu Reyes MD   [facility] ([facility]    [address] [place] [address]  [address]  [facility] [address]   [phone]           Bring a current list of meds and any records pertaining to this visit. For Physicals, please bring immunization records and any forms needing to be filled out. Please arrive 10 minutes early to complete paperwork.            May 31, 2018  1:30 PM CDT   Anticoagulation Visit with  ANTICOAGULATION CLINIC   [facility] ([facility]    [address] [place] [address]  [address]  [facility] [address]   [phone]            Jul 12, 2018  7:45 AM CDT   LAB with RU LAB   [facility] AT [place] ([facility])    [address]  [facility] [address] [phone]           Please do not eat 10-12 hours before your appointment if you are coming in fasting for labs on lipids, cholesterol, or glucose (sugar). This does not apply to pregnant women. Water, hot tea and black coffee (with nothing added) are okay. Do not drink other fluids, diet soda or chew gum.            Jul 12, 2018  9:00 AM CDT   Return Visit with Calderon Santo MD   [facility] ([facility])    [address]  [facility] [address] [phone]            Oct 19, 2018  9:00 AM CDT   Return Sleep  Patient with Isrrael Dobbins MD   [facility] ([facility])    [address]  [facility] [address] [phone]              Who to contact     If you have questions or need follow up information about today's clinic visit or your schedule please contact [facility] INFUSION SERVICES directly at [phone].  Normal or non-critical lab and imaging results will be communicated to you by MyChart, letter or phone within 4 business days after the clinic has received the results. If you do not hear from us within 7 days, please contact the clinic through MyChart or phone. If you have a critical or abnormal lab result, we will notify you by phone as soon as possible.  Submit refill requests through [employer] or call your pharmacy and they will forward the refill request to us. Please allow 3 business days for your refill to be completed.          Additional Information About Your Visit        Care EveryWhere ID     This is your Care EveryWhere ID. This could be used by other organizations to access your [facility] medical records  [id number]        Your Vitals Were     Pulse Temperature Respirations Pulse Oximetry          64 96.5  F (35.8  C) (Tympanic) 18 95%         Blood Pressure from Last 3 Encounters:   05/31/18 128/79   05/30/18 125/80   05/29/18 117/63    Weight from Last 3 Encounters:   05/22/18 100.2 kg (220 lb 14.4 oz)   05/08/18 102.5 kg (226 lb)   11/03/17 99.8 kg (220 lb)              Today, you had the following     No orders found for display       Primary Care Provider Office Phone # Fax #    uT Reyes [phone] [address] [place] [address]        Equal Access to Services     [facility] Hospital[facility] : Hadii ted Wu, wabeverley lopez, qaybta kehindealdenny escobar. So [facility] [phone].    ATENCIÓN: Si robertla espwinnie, bipin biggs brown  disposición servicios gratuitos de asistencia lingüística. Rebecca zurita [phone].    We comply with applicable federal civil rights laws and [place] laws. We do not discriminate on the basis of race, color, national origin, age, disability, sex, sexual orientation, or gender identity.            Thank you!     Thank you for choosing  INFUSION SERVICES  for your care. Our goal is always to provide you with excellent care. Hearing back from our patients is one way we can continue to improve our services. Please take a few minutes to complete the written survey that you may receive in the mail after your visit with us. Thank you!             Your Updated Medication List - Protect others around you: Learn how to safely use, store and throw away your medicines at www.disposemymeds.org.          This list is accurate as of 5/31/18  9:22 AM.  Always use your most recent med list.                   Brand Name Dispense Instructions for use Diagnosis    ASPIRIN NOT PRESCRIBED    INTENTIONAL    0 each    Please choose reason not prescribed, below    Long-term (current) use of anticoagulants, S/P aortic valve replacement       betamethasone valerate 0.1 % lotion    VALISONE    60 mL    APPLY TOPICALLY TWICE DAILY PRN    Eczema, unspecified type       cefTRIAXone 1 GM vial    ROCEPHIN    600 mL    Inject 2 g (2,000 mg) into the vein daily for 10 days CBC with differential, creatinine, SGOT weekly while on this medication to be faxed to Dr. Leija office.    Sepsis due to group B Streptococcus (H)       ezetimibe 10 MG tablet    ZETIA    90 tablet    Take 1 tablet (10 mg) by mouth daily    Mixed hyperlipidemia       fluocinonide 0.05 % ointment    LIDEX    60 g    2- 30 gram tubes.  Apply twice a day as needed.    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)       metoprolol tartrate 25 MG tablet    LOPRESSOR    180 tablet    Take 1 tablet (25 mg) by mouth 2 times daily    Coronary artery disease involving coronary bypass graft 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       rosuvastatin 40 MG tablet    CRESTOR    90 tablet    Take 1 tablet (40 mg) by mouth daily    Hyperlipidemia, unspecified hyperlipidemia type       tamsulosin 0.4 MG capsule    FLOMAX    90 capsule    Take 1 capsule (0.4 mg) by mouth daily    Urinary retention       warfarin 5 MG tablet    COUMADIN    135 tablet    Take 1 1/2 tablets (7.5 mg) by mouth TWTFSS; 1 tablet (5 mg) on Mondays OR as directed by INR Clinic    Long-term (current) use of anticoagulants, S/P aortic valve replacement

## 2018-05-31 NOTE — PROGRESS NOTES
Infusion Nursing Note:  Fausto Farr presents today for Rocephin, line D/C.    Patient seen by provider today: No   present during visit today: Not Applicable.    Note: midline d/c after today's dose. Line intact. Site is C,D,I, without redness or tenderness.  Covered with guaze and tegaderm    Intravenous Access:  Midline.    Treatment Conditions:  Not Applicable.      Post Infusion Assessment:  Patient tolerated infusion without incident.  Blood return noted pre and post infusion.  Site patent and intact, free from redness, edema or discomfort.  Access discontinued per protocol.    Discharge Plan:   Discharge instructions reviewed with: Patient.  Patient and/or family verbalized understanding of discharge instructions and all questions answered. Keep site covered with tegaderm today.  Pt see Dr Reyes this afternoon.  Patient discharged in stable condition accompanied by: self.  Departure Mode: Ambulatory.    Lee Ann Enriquez RN

## 2018-05-31 NOTE — TELEPHONE ENCOUNTER
Call to patient, he wasn't sure if he needed the appointment with dr. Reyes next week or it could be with a nurse only appointment.    Spoke with Dr. Reyes, patient just needs three negative MRSA nasal swabs a week apart.     Cancelled the patients OV with dr. Reyes next week. LM for patient to call back to reschedule as a nurse only.     Nahomy Song MA

## 2018-05-31 NOTE — MR AVS SNAPSHOT
After Visit Summary   5/31/2018    Fausto Farr    MRN: [id number]           Patient Information     Date Of Birth          1940        Visit Information        Provider Department      5/31/2018 1:10 PM Tu Reyes MD [facility]        Today's Diagnoses     Vitamin D deficiency    -  1    Left leg cellulitis          Care Instructions    1) Use yogurt to help with diarrhea- let me know if not improving    2) Labs downstairs today    3) Try cutting back potassium back to half the dose to see if this helps    4) Continue the warfarin for now    Let me know if things change.    Tu Reyes MD          Follow-ups after your visit        Follow-up notes from your care team     Return in about 6 months (around 11/30/2018).      Your next 10 appointments already scheduled     May 31, 2018  1:30 PM CDT   Anticoagulation Visit with  ANTICOAGULATION CLINIC   [facility]    [address]  [facility] [address]   [phone]            Jul 12, 2018  7:45 AM CDT   LAB with RU LAB   [facility] ([facility])    [address]  [facility] [id number] [phone]           Please do not eat 10-12 hours before your appointment if you are coming in fasting for labs on lipids, cholesterol, or glucose (sugar). This does not apply to pregnant women. Water, hot tea and black coffee (with nothing added) are okay. Do not drink other fluids, diet soda or chew gum.            Jul 12, 2018  9:00 AM CDT   Return Visit with Calderon Santo MD   [facility] ([facility])    [address]  [facility] [address] [phone]            Oct 19, 2018  9:00 AM CDT   Return Sleep Patient with Isrrael Dobbins MD   [facility] ([facility])    [address]  [facility] [address] [phone]              Who to contact     If you have questions or need follow up information about today's clinic visit or your schedule please contact [facility] [place] directly at [phone].  Normal or non-critical lab and imaging results will be communicated to you by MyChart, letter or phone within 4 business days after the clinic has received the results. If you do not hear from us within 7 days, please contact the clinic through MyChart or phone. If you have a critical or abnormal lab result, we will notify you by phone as soon as possible.  Submit refill requests through [employer] or call your pharmacy and they will forward the refill request to us. Please allow 3 business days for your refill to be completed.          Additional Information About Your Visit        Care EveryWhere ID     This is your Care EveryWhere ID. This could be used by other organizations to access your [place] medical records  [id number]        Your Vitals Were     Pulse Temperature Height Pulse Oximetry BMI (Body Mass Index)       71 97.6  F (36.4  C) (Oral) 5' 5.5\" (1.664 m) 94% 35.23 kg/m2        Blood Pressure from Last 3 Encounters:   05/31/18 124/72   05/31/18 128/79   05/30/18 125/80    Weight from Last 3 Encounters:   05/31/18 215 lb (97.5 kg)   05/22/18 220 lb 14.4 oz (100.2 kg)   05/08/18 226 lb (102.5 kg)              We Performed the Following     Basic metabolic panel     CBC with platelets differential     Vitamin D Deficiency        Primary Care Provider Office Phone # Fax #    Tu Reyes [phone] [address] [place] [address]        Equal Access to Services     [facility]: Hadii ted Wu, waswethada john, qaybta kaalmada denny rm. So [facility] [phone].    ATENCIÓN: Si habla español, tiene a brown disposición servicios gratuitos de asistencia lingüística. Llame al " [phone].    We comply with applicable federal civil rights laws and [place] laws. We do not discriminate on the basis of race, color, national origin, age, disability, sex, sexual orientation, or gender identity.            Thank you!     Thank you for choosing [facility]  for your care. Our goal is always to provide you with excellent care. Hearing back from our patients is one way we can continue to improve our services. Please take a few minutes to complete the written survey that you may receive in the mail after your visit with us. Thank you!             Your Updated Medication List - Protect others around you: Learn how to safely use, store and throw away your medicines at www.disposemymeds.org.          This list is accurate as of 5/31/18  1:15 PM.  Always use your most recent med list.                   Brand Name Dispense Instructions for use Diagnosis    ASPIRIN NOT PRESCRIBED    INTENTIONAL    0 each    Please choose reason not prescribed, below    Long-term (current) use of anticoagulants, S/P aortic valve replacement       betamethasone valerate 0.1 % lotion    VALISONE    60 mL    APPLY TOPICALLY TWICE DAILY PRN    Eczema, unspecified type       cefTRIAXone 1 GM vial    ROCEPHIN    600 mL    Inject 2 g (2,000 mg) into the vein daily for 10 days CBC with differential, creatinine, SGOT weekly while on this medication to be faxed to Dr. Leija office.    Sepsis due to group B Streptococcus (H)       ezetimibe 10 MG tablet    ZETIA    90 tablet    Take 1 tablet (10 mg) by mouth daily    Mixed hyperlipidemia       fluocinonide 0.05 % ointment    LIDEX    60 g    2- 30 gram tubes.  Apply twice a day as needed.    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)       metoprolol tartrate 25 MG tablet    LOPRESSOR    180 tablet    Take 1 tablet (25 mg) by mouth 2 times daily    Coronary artery disease involving coronary bypass graft 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       Potassium Chloride ER 20 MEQ Tbcr     30 tablet    Take 1 tablet (20 mEq) by mouth daily        rosuvastatin 40 MG tablet    CRESTOR    90 tablet    Take 1 tablet (40 mg) by mouth daily    Hyperlipidemia, unspecified hyperlipidemia type       tamsulosin 0.4 MG capsule    FLOMAX    90 capsule    Take 1 capsule (0.4 mg) by mouth daily    Urinary retention       warfarin 5 MG tablet    COUMADIN    135 tablet    Take 1 1/2 tablets (7.5 mg) by mouth TWTFSS; 1 tablet (5 mg) on Mondays OR as directed by INR Clinic    Long-term (current) use of anticoagulants, S/P aortic valve replacement

## 2018-05-31 NOTE — LETTER
[facility]  [address]  [place] [address]                  [phone]   June 4, 2018    Fausto Farr  [address]  [place] [address]      Dear Ralph,     We should consider if your hip pain becomes worse, rechecking some inflammation labs. Give us a call if having ongoing symptoms and let the orthopedics doctor know about your recent infection when discussing your hip pain.     Sincerely,     Tu Reyes MD         Results for orders placed or performed in visit on 05/31/18   Vitamin D Deficiency   Result Value Ref Range    Vitamin D Deficiency screening 40 20 - 75 ug/L   CBC with platelets differential   Result Value Ref Range    WBC 10.1 4.0 - 11.0 10e9/L    RBC Count 4.68 4.4 - 5.9 10e12/L    Hemoglobin 14.0 13.3 - 17.7 g/dL    Hematocrit 43.2 40.0 - 53.0 %    MCV 92 78 - 100 fl    MCH 29.9 26.5 - 33.0 pg    MCHC 32.4 31.5 - 36.5 g/dL    RDW 13.3 10.0 - 15.0 %    Platelet Count 304 150 - 450 10e9/L    Diff Method Automated Method     % Neutrophils 68.7 %    % Lymphocytes 18.8 %    % Monocytes 10.5 %    % Eosinophils 1.4 %    % Basophils 0.6 %    Absolute Neutrophil 6.9 1.6 - 8.3 10e9/L    Absolute Lymphocytes 1.9 0.8 - 5.3 10e9/L    Absolute Monocytes 1.1 0.0 - 1.3 10e9/L    Absolute Eosinophils 0.1 0.0 - 0.7 10e9/L    Absolute Basophils 0.1 0.0 - 0.2 10e9/L   Basic metabolic panel   Result Value Ref Range    Sodium 138 133 - 144 mmol/L    Potassium 4.6 3.4 - 5.3 mmol/L    Chloride 102 94 - 109 mmol/L    Carbon Dioxide 27 20 - 32 mmol/L    Anion Gap 9 3 - 14 mmol/L    Glucose 96 70 - 99 mg/dL    Urea Nitrogen 18 7 - 30 mg/dL    Creatinine 1.16 0.66 - 1.25 mg/dL    GFR Estimate 61 >60 mL/min/1.7m2    GFR Estimate If Black 74 >60 mL/min/1.7m2    Calcium 9.5 8.5 - 10.1 mg/dL

## 2018-06-01 LAB
ANION GAP SERPL CALCULATED.3IONS-SCNC: 9 MMOL/L (ref 3–14)
BUN SERPL-MCNC: 18 MG/DL (ref 7–30)
CALCIUM SERPL-MCNC: 9.5 MG/DL (ref 8.5–10.1)
CHLORIDE SERPL-SCNC: 102 MMOL/L (ref 94–109)
CO2 SERPL-SCNC: 27 MMOL/L (ref 20–32)
CREAT SERPL-MCNC: 1.16 MG/DL (ref 0.66–1.25)
DEPRECATED CALCIDIOL+CALCIFEROL SERPL-MC: 40 UG/L (ref 20–75)
GFR SERPL CREATININE-BSD FRML MDRD: 61 ML/MIN/1.7M2
GLUCOSE SERPL-MCNC: 96 MG/DL (ref 70–99)
POTASSIUM SERPL-SCNC: 4.6 MMOL/L (ref 3.4–5.3)
SODIUM SERPL-SCNC: 138 MMOL/L (ref 133–144)

## 2018-06-07 ENCOUNTER — ALLIED HEALTH/NURSE VISIT (OUTPATIENT)
Dept: NURSING | Facility: CLINIC | Age: 78
End: 2018-06-07
Payer: MEDICARE

## 2018-06-07 DIAGNOSIS — A49.02 MRSA (METHICILLIN RESISTANT STAPHYLOCOCCUS AUREUS): Primary | ICD-10-CM

## 2018-06-07 LAB
MRSA DNA SPEC QL NAA+PROBE: NEGATIVE
SPECIMEN SOURCE: NORMAL

## 2018-06-07 PROCEDURE — 87640 STAPH A DNA AMP PROBE: CPT | Performed by: INTERNAL MEDICINE

## 2018-06-07 PROCEDURE — 99207 ZZC NO CHARGE NURSE ONLY: CPT

## 2018-06-07 PROCEDURE — 87641 MR-STAPH DNA AMP PROBE: CPT | Performed by: INTERNAL MEDICINE

## 2018-06-07 NOTE — MR AVS SNAPSHOT
After Visit Summary   6/7/2018    Fausto Farr    MRN: [id number]           Patient Information     Date Of Birth          1940        Visit Information        Provider Department      6/7/2018 1:30 PM EA NURSE AB [facility]        Today's Diagnoses     MRSA (methicillin resistant Staphylococcus aureus)    -  1       Follow-ups after your visit        Your next 10 appointments already scheduled     Jun 14, 2018  9:30 AM CDT   Nurse Only with EA NURSE AB   [facility] [place] ([facility] [place])    [address]  [place] [address]   [phone]            Jul 09, 2018  8:45 AM CDT   Anticoagulation Visit with EA ANTICOAGULATION CLINIC   [facility] [place] ([facility] [place])    [address]  [address]  [place] [address]   [phone]            Jul 12, 2018  7:45 AM CDT   LAB with RU LAB   [facility] ([facility])    [address] [place] [address] [address]  [facility] [address] [phone]           Please do not eat 10-12 hours before your appointment if you are coming in fasting for labs on lipids, cholesterol, or glucose (sugar). This does not apply to pregnant women. Water, hot tea and black coffee (with nothing added) are okay. Do not drink other fluids, diet soda or chew gum.            Jul 12, 2018  9:00 AM CDT   Return Visit with Calderon Santo MD   [facility] ([facility])    [address] [place] [address] [address]  [facility] [address] [phone]            Oct 19, 2018  9:00 AM CDT   Return Sleep Patient with Isrrael Dobbins MD   [facility] ([facility])    [address]  [facility] [address] [phone]              Who to contact     If you have questions or need follow up information about today's clinic visit or your schedule please  contact [facility] directly at [phone].  Normal or non-critical lab and imaging results will be communicated to you by MyChart, letter or phone within 4 business days after the clinic has received the results. If you do not hear from us within 7 days, please contact the clinic through MyChart or phone. If you have a critical or abnormal lab result, we will notify you by phone as soon as possible.  Submit refill requests through [employer] or call your pharmacy and they will forward the refill request to us. Please allow 3 business days for your refill to be completed.          Additional Information About Your Visit        Care EveryWhere ID     This is your Care EveryWhere ID. This could be used by other organizations to access your [place] medical records  [id number]         Blood Pressure from Last 3 Encounters:   05/31/18 124/72   05/31/18 128/79   05/30/18 125/80    Weight from Last 3 Encounters:   05/31/18 215 lb (97.5 kg)   05/22/18 220 lb 14.4 oz (100.2 kg)   05/08/18 226 lb (102.5 kg)              We Performed the Following     Methicillin Resist/Sens S. aureus PCR        Primary Care Provider Office Phone # Fax #    Tu Reyes [phone] [address] [place] [address]        Equal Access to Services     [facility]: Hadii aad ku hadasho Soomaali, waaxda luqadaha, qaybta kaalmada adeegyada, waxay milind hayadela blake . So [facility] [phone].    ATENCIÓN: Si habla español, tiene a brown disposición servicios gratuitos de asistencia lingüística. Llame al [phone].    We comply with applicable federal civil rights laws and [place] laws. We do not discriminate on the basis of race, color, national origin, age, disability, sex, sexual orientation, or gender identity.            Thank you!     Thank you for choosing [facility]  for your care. Our goal is always to provide you with excellent care. Hearing back from our  patients is one way we can continue to improve our services. Please take a few minutes to complete the written survey that you may receive in the mail after your visit with us. Thank you!             Your Updated Medication List - Protect others around you: Learn how to safely use, store and throw away your medicines at www.disposemymeds.org.          This list is accurate as of 6/7/18  1:37 PM.  Always use your most recent med list.                   Brand Name Dispense Instructions for use Diagnosis    ACE/ARB/ARNI NOT PRESCRIBED (INTENTIONAL)      Please choose reason not prescribed, below    Vitamin D deficiency, Left leg cellulitis, Essential hypertension, benign, Atrial fibrillation, unspecified type (H)       ASPIRIN NOT PRESCRIBED    INTENTIONAL    0 each    Please choose reason not prescribed, below    Long-term (current) use of anticoagulants, S/P aortic valve replacement       betamethasone valerate 0.1 % lotion    VALISONE    60 mL    APPLY TOPICALLY TWICE DAILY PRN    Eczema, unspecified type       ezetimibe 10 MG tablet    ZETIA    90 tablet    Take 1 tablet (10 mg) by mouth daily    Mixed hyperlipidemia       fluocinonide 0.05 % ointment    LIDEX    60 g    2- 30 gram tubes.  Apply twice a day as needed.    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)       metoprolol tartrate 25 MG tablet    LOPRESSOR    180 tablet    Take 1 tablet (25 mg) by mouth 2 times daily    Coronary artery disease involving coronary bypass graft 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       Potassium Chloride ER 20 MEQ Tbcr     30 tablet    Take 1 tablet (20 mEq) by mouth daily    Vitamin D deficiency, Left leg cellulitis, Essential hypertension,  benign, Atrial fibrillation, unspecified type (H)       rosuvastatin 40 MG tablet    CRESTOR    90 tablet    Take 1 tablet (40 mg) by mouth daily    Hyperlipidemia, unspecified hyperlipidemia type       tamsulosin 0.4 MG capsule    FLOMAX    90 capsule    Take 1 capsule (0.4 mg) by mouth daily    Urinary retention       warfarin 5 MG tablet    COUMADIN    135 tablet    Take 1 1/2 tablets (7.5 mg) by mouth TWTFSS; 1 tablet (5 mg) on Mondays OR as directed by INR Clinic    Long-term (current) use of anticoagulants, S/P aortic valve replacement

## 2018-06-12 DIAGNOSIS — A49.02 MRSA INFECTION: Primary | ICD-10-CM

## 2018-06-12 DIAGNOSIS — K51.20 ULCERATIVE PROCTITIS WITHOUT COMPLICATION (H): ICD-10-CM

## 2018-06-12 RX ORDER — MESALAMINE 800 MG/1
TABLET, DELAYED RELEASE ORAL
Qty: 180 TABLET | Refills: 3 | Status: SHIPPED | OUTPATIENT
Start: 2018-06-12 | End: 2018-07-12

## 2018-06-12 NOTE — TELEPHONE ENCOUNTER
Not due for a refill.     mesalamine (ASACOL HD) 800 MG EC tablet was filled on 5/8/2018, qty 180 with 3 refills.

## 2018-06-14 ENCOUNTER — ALLIED HEALTH/NURSE VISIT (OUTPATIENT)
Dept: NURSING | Facility: CLINIC | Age: 78
End: 2018-06-14
Payer: MEDICARE

## 2018-06-14 DIAGNOSIS — A49.02 MRSA INFECTION: Primary | ICD-10-CM

## 2018-06-14 LAB
MRSA DNA SPEC QL NAA+PROBE: POSITIVE
SPECIMEN SOURCE: ABNORMAL

## 2018-06-14 PROCEDURE — 87641 MR-STAPH DNA AMP PROBE: CPT | Performed by: INTERNAL MEDICINE

## 2018-06-14 PROCEDURE — 99207 ZZC NO CHARGE NURSE ONLY: CPT

## 2018-06-14 PROCEDURE — 87640 STAPH A DNA AMP PROBE: CPT | Mod: XU | Performed by: INTERNAL MEDICINE

## 2018-06-14 NOTE — LETTER
[facility]  [address]  [place] [address]                  [phone]   June 18, 2018    Fausto Farr  [address]  [place] [address]      Dear Fausto,     Here are the results from the recent Labs that we did.     The swab of the nose was positive. We can try to clear this with mupirocin in the nose for 5 days. I recommend doing a Hibiclens shower twice with this as well (can buy over the counter). We can recheck testing following these. We typically need 3 clear from the nose to say this is gone. I will order these so we can recheck after.     Let me know if you have questions or concerns!     Sincerely,       Tu Reyes MD   Internal Medicine and Pediatrics         Results for orders placed or performed in visit on 06/14/18   Methicillin Resist/Sens S. aureus PCR   Result Value Ref Range    Specimen Description Nares     Methicillin Resist/Sens S. aureus PCR Positive (A) NEG^Negative   Referral sensitivity   Result Value Ref Range    Specimen Description Nares     Culture Micro       No MRSA isolated: susceptibilities not available. PCR assay is more sensitive than   culture.

## 2018-06-14 NOTE — MR AVS SNAPSHOT
After Visit Summary   6/14/2018    Fausto Farr    MRN: [id number]           Patient Information     Date Of Birth          1940        Visit Information        Provider Department      6/14/2018 9:30 AM EA NURSE AB [facility] [place]        Today's Diagnoses     MRSA infection    -  1       Follow-ups after your visit        Your next 10 appointments already scheduled     Jun 14, 2018  9:30 AM CDT   Nurse Only with EA NURSE AB   [facility] [place] [facility]    [address]  [place] [address]   [phone]            Jul 09, 2018  8:45 AM CDT   Anticoagulation Visit with EA ANTICOAGULATION CLINIC   [facility] [place] ([facility] [place])    [address]  [address]  [place] [address] [phone]            Jul 12, 2018  7:45 AM CDT   LAB with RU LAB   [facility] ([facility])    [address]  [facility] [address] [phone]           Please do not eat 10-12 hours before your appointment if you are coming in fasting for labs on lipids, cholesterol, or glucose (sugar). This does not apply to pregnant women. Water, hot tea and black coffee (with nothing added) are okay. Do not drink other fluids, diet soda or chew gum.            Jul 12, 2018  9:00 AM CDT   Return Visit with Calderon Santo MD   [facility] ([facility])    [address]  [facility] [address] [phone]            Oct 19, 2018  9:00 AM CDT   Return Sleep Patient with Isrrael Dobbins MD   [facility] ([facility])    [address]  [facility] [address] [phone]              Who to contact     If you have questions or need follow up information about today's clinic visit or your schedule please contact [facility] [place] directly  at [phone].  Normal or non-critical lab and imaging results will be communicated to you by MyChart, letter or phone within 4 business days after the clinic has received the results. If you do not hear from us within 7 days, please contact the clinic through MyChart or phone. If you have a critical or abnormal lab result, we will notify you by phone as soon as possible.  Submit refill requests through [employer] or call your pharmacy and they will forward the refill request to us. Please allow 3 business days for your refill to be completed.          Additional Information About Your Visit        Care EveryWhere ID     This is your Care EveryWhere ID. This could be used by other organizations to access your [place] medical records  [id number]         Blood Pressure from Last 3 Encounters:   05/31/18 124/72   05/31/18 128/79   05/30/18 125/80    Weight from Last 3 Encounters:   05/31/18 215 lb (97.5 kg)   05/22/18 220 lb 14.4 oz (100.2 kg)   05/08/18 226 lb (102.5 kg)              We Performed the Following     MRSA Screening Culture ([facility])        Primary Care Provider Office Phone # Fax #    Tu Ryees [phone] [address] [place] [address]        Equal Access to Services     [facility] : Hadii ted ku hadasho Soomaali, waaxda luqadaha, qaybta kaalmada adeegyada, waxay milind goldberg. So [facility] [phone].    ATENCIÓN: Si habla español, tiene a brown disposición servicios gratuitos de asistencia lingüística. Llame al [phone].    We comply with applicable federal civil rights laws and [place] laws. We do not discriminate on the basis of race, color, national origin, age, disability, sex, sexual orientation, or gender identity.            Thank you!     Thank you for choosing [facility]  for your care. Our goal is always to provide you with excellent care. Hearing back from our patients is one way we can continue to improve  our services. Please take a few minutes to complete the written survey that you may receive in the mail after your visit with us. Thank you!             Your Updated Medication List - Protect others around you: Learn how to safely use, store and throw away your medicines at www.disposemymeds.org.          This list is accurate as of 6/14/18  9:04 AM.  Always use your most recent med list.                   Brand Name Dispense Instructions for use Diagnosis    ACE/ARB/ARNI NOT PRESCRIBED (INTENTIONAL)      Please choose reason not prescribed, below    Vitamin D deficiency, Left leg cellulitis, Essential hypertension, benign, Atrial fibrillation, unspecified type (H)       ASPIRIN NOT PRESCRIBED    INTENTIONAL    0 each    Please choose reason not prescribed, below    Long-term (current) use of anticoagulants, S/P aortic valve replacement       betamethasone valerate 0.1 % lotion    VALISONE    60 mL    APPLY TOPICALLY TWICE DAILY PRN    Eczema, unspecified type       ezetimibe 10 MG tablet    ZETIA    90 tablet    Take 1 tablet (10 mg) by mouth daily    Mixed hyperlipidemia       fluocinonide 0.05 % ointment    LIDEX    60 g    2- 30 gram tubes.  Apply twice a day as needed.    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)       metoprolol tartrate 25 MG tablet    LOPRESSOR    180 tablet    Take 1 tablet (25 mg) by mouth 2 times daily    Coronary artery disease involving coronary bypass graft 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       Potassium Chloride ER 20 MEQ Tbcr     30 tablet     Take 1 tablet (20 mEq) by mouth daily    Vitamin D deficiency, Left leg cellulitis, Essential hypertension, benign, Atrial fibrillation, unspecified type (H)       rosuvastatin 40 MG tablet    CRESTOR    90 tablet    Take 1 tablet (40 mg) by mouth daily    Hyperlipidemia, unspecified hyperlipidemia type       tamsulosin 0.4 MG capsule    FLOMAX    90 capsule    Take 1 capsule (0.4 mg) by mouth daily    Urinary retention       warfarin 5 MG tablet    COUMADIN    135 tablet    Take 1 1/2 tablets (7.5 mg) by mouth TWTFSS; 1 tablet (5 mg) on Mondays OR as directed by INR Clinic    Long-term (current) use of anticoagulants, S/P aortic valve replacement       * Notice:  This list has 2 medication(s) that are the same as other medications prescribed for you. Read the directions carefully, and ask your doctor or other care provider to review them with you.

## 2018-06-18 ENCOUNTER — TRANSFERRED RECORDS (OUTPATIENT)
Dept: HEALTH INFORMATION MANAGEMENT | Facility: CLINIC | Age: 78
End: 2018-06-18

## 2018-06-18 LAB
BACTERIA SPEC CULT: NORMAL
SPECIMEN SOURCE: NORMAL

## 2018-06-18 RX ORDER — MUPIROCIN 20 MG/G
OINTMENT TOPICAL 3 TIMES DAILY
Qty: 22 G | Refills: 1 | Status: SHIPPED | OUTPATIENT
Start: 2018-06-18 | End: 2018-06-23

## 2018-06-21 ENCOUNTER — TRANSFERRED RECORDS (OUTPATIENT)
Dept: HEALTH INFORMATION MANAGEMENT | Facility: CLINIC | Age: 78
End: 2018-06-21

## 2018-06-21 ENCOUNTER — ALLIED HEALTH/NURSE VISIT (OUTPATIENT)
Dept: NURSING | Facility: CLINIC | Age: 78
End: 2018-06-21
Payer: MEDICARE

## 2018-06-21 DIAGNOSIS — A49.02 MRSA INFECTION: ICD-10-CM

## 2018-06-21 LAB
MRSA DNA SPEC QL NAA+PROBE: POSITIVE
SPECIMEN SOURCE: ABNORMAL

## 2018-06-21 PROCEDURE — 99207 ZZC NO CHARGE NURSE ONLY: CPT

## 2018-06-21 PROCEDURE — 87640 STAPH A DNA AMP PROBE: CPT | Mod: XU | Performed by: INTERNAL MEDICINE

## 2018-06-21 PROCEDURE — 87641 MR-STAPH DNA AMP PROBE: CPT | Performed by: INTERNAL MEDICINE

## 2018-06-21 NOTE — MR AVS SNAPSHOT
After Visit Summary   6/21/2018    Fausto Farr    MRN: [id number]           Patient Information     Date Of Birth          1940        Visit Information        Provider Department      6/21/2018 9:00 AM EA NURSE AB [facility] [place]        Today's Diagnoses     MRSA infection           Follow-ups after your visit        Your next 10 appointments already scheduled     Jul 09, 2018  8:45 AM CDT   Anticoagulation Visit with EA ANTICOAGULATION CLINIC   [facility] [place] ([facility] [place])    [address]  [address]  [place] [address]   [phone]            Jul 10, 2018  8:30 AM CDT   (Arrive by 8:15 AM)   US CAROTID BILATERAL with [address] ([facility])    [address]  [facility] [id number] [phone]           Please bring a list of your medicines (including vitamins, minerals and over-the-counter drugs). Also, tell your doctor about any allergies you may have. Wear comfortable clothes and leave your valuables at home.  You do not need to do anything special to prepare for your exam.  Please call the Imaging Department at your exam site with any questions.            Jul 10, 2018  9:15 AM CDT   US AORTA/IVC/ILIAC DUPLEX COMPLETE with [address] ([facility])    [address]  [facility] [id number] [phone]           Please bring a list of your medicines (including vitamins, minerals and over-the-counter drugs). Also, tell your doctor about any allergies you may have. Wear comfortable clothes and leave your valuables at home.  Adults: No eating or drinking for 8 hours before the exam. You may take medicine with a small sip of water.  Children: - Children 6+ years: No food or drink for 6 hours before exam. - Children 1-5 years: No food or drink for 4 hours before exam. - Infants, breast-fed: may  have breast milk up to 2 hours before exam. - Infants, formula: may have bottle until 4 hours before exam.  Please call the Imaging Department at your exam site with any questions.            Jul 12, 2018  7:45 AM CDT   LAB with RU LAB   [facility] ([facility])    [address]  [facility] [address]   [phone]           Please do not eat 10-12 hours before your appointment if you are coming in fasting for labs on lipids, cholesterol, or glucose (sugar). This does not apply to pregnant women. Water, hot tea and black coffee (with nothing added) are okay. Do not drink other fluids, diet soda or chew gum.            Jul 12, 2018  9:00 AM CDT   Return Visit with Calderon Santo MD   [facility] ([facility])    [address] [place] [address]  [facility] [address]   [phone]            Oct 19, 2018  9:00 AM CDT   Return Sleep Patient with Isrrael Dobbins MD   [facility] ([facility])    [address]  [facility] [id number] [phone]              Who to contact     If you have questions or need follow up information about today's clinic visit or your schedule please contact [facility] directly at [phone].  Normal or non-critical lab and imaging results will be communicated to you by MyChart, letter or phone within 4 business days after the clinic has received the results. If you do not hear from us within 7 days, please contact the clinic through MyChart or phone. If you have a critical or abnormal lab result, we will notify you by phone as soon as possible.  Submit refill requests through [employer] or call your pharmacy and they will forward the refill request to us. Please allow 3 business days for your refill to be completed.          Additional Information About Your Visit        Care EveryWhere ID     This  is your Care EveryWhere ID. This could be used by other organizations to access your [place] medical records  [id number]         Blood Pressure from Last 3 Encounters:   05/31/18 124/72   05/31/18 128/79   05/30/18 125/80    Weight from Last 3 Encounters:   05/31/18 215 lb (97.5 kg)   05/22/18 220 lb 14.4 oz (100.2 kg)   05/08/18 226 lb (102.5 kg)              We Performed the Following     Methicillin Resist/Sens S. aureus PCR        Primary Care Provider Office Phone # Fax #    Tu Reyes [phone] [address] [place] [address]        Equal Access to Services     [facility]: Hadii ted Wu, waaxda ludwinadaha, qaybta kaalmada adediazyarenetta, denny blake . So [facility] [phone].    ATENCIÓN: Si habla español, tiene a brown disposición servicios gratuitos de asistencia lingüística. Llame al [phone].    We comply with applicable federal civil rights laws and [place] laws. We do not discriminate on the basis of race, color, national origin, age, disability, sex, sexual orientation, or gender identity.            Thank you!     Thank you for choosing [facility]  for your care. Our goal is always to provide you with excellent care. Hearing back from our patients is one way we can continue to improve our services. Please take a few minutes to complete the written survey that you may receive in the mail after your visit with us. Thank you!             Your Updated Medication List - Protect others around you: Learn how to safely use, store and throw away your medicines at www.disposemymeds.org.          This list is accurate as of 6/21/18  9:45 AM.  Always use your most recent med list.                   Brand Name Dispense Instructions for use Diagnosis    ACE/ARB/ARNI NOT PRESCRIBED (INTENTIONAL)      Please choose reason not prescribed, below    Vitamin D deficiency, Left leg cellulitis, Essential hypertension,  benign, Atrial fibrillation, unspecified type (H)       ASPIRIN NOT PRESCRIBED    INTENTIONAL    0 each    Please choose reason not prescribed, below    Long-term (current) use of anticoagulants, S/P aortic valve replacement       betamethasone valerate 0.1 % lotion    VALISONE    60 mL    APPLY TOPICALLY TWICE DAILY PRN    Eczema, unspecified type       ezetimibe 10 MG tablet    ZETIA    90 tablet    Take 1 tablet (10 mg) by mouth daily    Mixed hyperlipidemia       fluocinonide 0.05 % ointment    LIDEX    60 g    2- 30 gram tubes.  Apply twice a day as needed.    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)       metoprolol tartrate 25 MG tablet    LOPRESSOR    180 tablet    Take 1 tablet (25 mg) by mouth 2 times daily    Coronary artery disease involving coronary bypass graft 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       mupirocin 2 % ointment    BACTROBAN    22 g    Apply topically 3 times daily for 5 days To the nose for 5 days    MRSA infection       Potassium Chloride ER 20 MEQ Tbcr     30 tablet    Take 1 tablet (20 mEq) by mouth daily    Vitamin D deficiency, Left leg cellulitis, Essential hypertension, benign, Atrial fibrillation, unspecified type (H)       rosuvastatin 40 MG tablet    CRESTOR    90 tablet    Take 1 tablet (40 mg) by mouth daily    Hyperlipidemia, unspecified hyperlipidemia type       tamsulosin 0.4 MG capsule    FLOMAX    90 capsule    Take 1 capsule (0.4 mg) by mouth daily    Urinary retention       warfarin 5 MG tablet    COUMADIN    135 tablet    Take 1  1/2 tablets (7.5 mg) by mouth TWTFSS; 1 tablet (5 mg) on Mondays OR as directed by INR Clinic    Long-term (current) use of anticoagulants, S/P aortic valve replacement       * Notice:  This list has 2 medication(s) that are the same as other medications prescribed for you. Read the directions carefully, and ask your doctor or other care provider to review them with you.

## 2018-06-21 NOTE — LETTER
[facility]  [address]  [place] [address]                  [phone]   June 26, 2018    Fausto Farr  [address]  [place] [address]      Dear Fausto,    Here is a summary of your recent test results:    The nasal swab is positive for MRSA still. We can have you clear this with use of mupirocin to the nose 3 times per day for 5 days. You should also use Hibiclens twice (from the neck down) for showers as well (can get from the pharmacy). I sent in a Prescription for the mupirocin for you to your pharmacy.     Your test results are enclosed.      Please contact me if you have any questions.           Thank you very much for choosing [facility]    Best regards,    Tu Reyes MD        Results for orders placed or performed in visit on 06/21/18   Methicillin Resist/Sens S. aureus PCR   Result Value Ref Range    Specimen Description Nares     Methicillin Resist/Sens S. aureus PCR Positive (A) NEG^Negative

## 2018-06-25 RX ORDER — MUPIROCIN 20 MG/G
OINTMENT TOPICAL 3 TIMES DAILY
Qty: 22 G | Refills: 1 | Status: SHIPPED | OUTPATIENT
Start: 2018-06-25 | End: 2018-06-30

## 2018-07-06 ENCOUNTER — TELEPHONE (OUTPATIENT)
Dept: PEDIATRICS | Facility: CLINIC | Age: 78
End: 2018-07-06

## 2018-07-06 DIAGNOSIS — A49.02 MRSA INFECTION: Primary | ICD-10-CM

## 2018-07-06 NOTE — TELEPHONE ENCOUNTER
Can wait for  to address next week:    - pt was dx with MRSA & has been treated   - pt picked up the rx for mipirocin 2 days ago  - he would like to know whether he can get another swab after he is done with treatment to make sure the the inf is gone    Please advise. Need to notify pt. Pt can be reached at [phone](OK to leave detailed message in , if he isn't available).    Lab result notes from 6/25:  The nasal swab is positive for MRSA still. We can have you clear this with use of mupirocin to the nose 3 times per day for 5 days. You should also use Hibiclens twice (from the neck down) for showers as well (can get from the pharmacy). I sent in a Prescription for the mupirocin for you to your pharmacy.    Nikolay, RN  Triage Nurse

## 2018-07-09 ENCOUNTER — ANTICOAGULATION THERAPY VISIT (OUTPATIENT)
Dept: NURSING | Facility: CLINIC | Age: 78
End: 2018-07-09
Payer: MEDICARE

## 2018-07-09 DIAGNOSIS — Z79.01 LONG-TERM (CURRENT) USE OF ANTICOAGULANTS: ICD-10-CM

## 2018-07-09 DIAGNOSIS — I48.91 ATRIAL FIBRILLATION, UNSPECIFIED TYPE (H): ICD-10-CM

## 2018-07-09 DIAGNOSIS — Z95.2 S/P MITRAL VALVE REPLACEMENT: ICD-10-CM

## 2018-07-09 LAB — INR POINT OF CARE: 2.3 (ref 0.86–1.14)

## 2018-07-09 PROCEDURE — 85610 PROTHROMBIN TIME: CPT | Mod: QW

## 2018-07-09 PROCEDURE — 99207 ZZC NO CHARGE NURSE ONLY: CPT

## 2018-07-09 PROCEDURE — 36416 COLLJ CAPILLARY BLOOD SPEC: CPT

## 2018-07-09 NOTE — MR AVS SNAPSHOT
Fausto Carson Yordan   7/9/2018 8:45 AM   Anticoagulation Therapy Visit    Description:  78 year old male   Provider:  [facility]   Department:   Nurse           INR as of 7/9/2018     Today's INR 2.3!      Anticoagulation Summary as of 7/9/2018     INR goal 2.5-3.5   Today's INR 2.3!   Full warfarin instructions 7/9: 7.5 mg; Otherwise 5 mg on Mon; 7.5 mg all other days   Next INR check 7/30/2018    Indications   AF (atrial fibrillation) (H) [I48.91]  S/P mitral valve replacement [Z95.2]  Long-term (current) use of anticoagulants [Z79.01] [Z79.01]         Your next Anticoagulation Clinic appointment(s)     Jul 30, 2018  8:30 AM CDT   Anticoagulation Visit with  ANTICOAGULATION CLINIC   [facility] [place] ([facility])    [address]  [address]  [facility] [id number] [phone]              Contact Numbers     [facility]  Please call  [phone] to cancel and/or reschedule your appointment   Please call  [phone] with any problems or questions regarding your therapy.        July 2018 Details    Sun Mon Tue Wed Thu Fri Sat     1               2               3               4               5               6               7                 8               9      7.5 mg   See details      10      7.5 mg         11      7.5 mg         12      7.5 mg         13      7.5 mg         14      7.5 mg           15      7.5 mg         16      5 mg         17      7.5 mg         18      7.5 mg         19      7.5 mg         20      7.5 mg         21      7.5 mg           22      7.5 mg         23      5 mg         24      7.5 mg         25      7.5 mg         26      7.5 mg         27      7.5 mg         28      7.5 mg           29      7.5 mg         30            31                    Date Details   07/09 This INR check       Date of next INR:  7/30/2018         How to take your warfarin dose     To take:  5 mg Take 1 of the 5 mg tablets.    To take:  7.5 mg Take  1.5 of the 5 mg tablets.

## 2018-07-09 NOTE — PROGRESS NOTES
ANTICOAGULATION FOLLOW-UP CLINIC VISIT    Patient Name:  Fausto Farr  Date:  7/9/2018  Contact Type:  Face to Face  Went on a road trip with friends for 11 days, states that he missed couple of doses of coumadin during vacation(unsure of dates). Denies any concerns. Pt doesn't want to come in to recheck his INR in a week or two, bacause of upcoming another vacation. So, will plan to increase his coumadin to 7.5 mg qd for this week, then go back to his normal 5 mg on Mon & 7.5 mg all other days from next week. Recheck on 7/30, unless he need to be seen sooner for any concerns. Pt agrees.     SUBJECTIVE:     Patient Findings     Positives Missed doses    Comments Denies bleeding/bruising. Missed 2 doses.             OBJECTIVE    INR Protime   Date Value Ref Range Status   07/09/2018 2.3 (A) 0.86 - 1.14 Final       ASSESSMENT / PLAN  INR assessment THER    Recheck INR In: 3 WEEKS    INR Location Clinic      Anticoagulation Summary as of 7/9/2018     INR goal 2.5-3.5   Today's INR 2.3!   Warfarin maintenance plan 5 mg (5 mg x 1) on Mon; 7.5 mg (5 mg x 1.5) all other days   Full warfarin instructions 7/9: 7.5 mg; Otherwise 5 mg on Mon; 7.5 mg all other days   Weekly warfarin total 50 mg   Plan last modified Caryn Campos, RN (11/9/2017)   Next INR check 7/30/2018   Priority INR   Target end date Indefinite    Indications   AF (atrial fibrillation) (H) [I48.91]  S/P mitral valve replacement [Z95.2]  Long-term (current) use of anticoagulants [Z79.01] [Z79.01]         Anticoagulation Episode Summary     INR check location     Preferred lab     Send INR reminders to [facility]    Comments 5mg tabs - andrade dose // transfer from [facility] // [employer] [place] // CALENDAR      Anticoagulation Care Providers     Provider Role Specialty Phone number    Tu Reyes MD  Internal Medicine [phone]            See the Encounter Report to view Anticoagulation Flowsheet and Dosing Calendar (Go to  Encounters tab in chart review, and find the Anticoagulation Therapy Visit)    Chayito Sanders RN

## 2018-07-10 ENCOUNTER — HOSPITAL ENCOUNTER (OUTPATIENT)
Dept: ULTRASOUND IMAGING | Facility: CLINIC | Age: 78
Discharge: HOME | End: 2018-07-10
Attending: RADIOLOGY | Admitting: RADIOLOGY
Payer: MEDICARE

## 2018-07-10 ENCOUNTER — HOSPITAL ENCOUNTER (OUTPATIENT)
Dept: ULTRASOUND IMAGING | Facility: CLINIC | Age: 78
End: 2018-07-10
Attending: RADIOLOGY
Payer: MEDICARE

## 2018-07-10 DIAGNOSIS — R09.89 CAROTID BRUIT, UNSPECIFIED LATERALITY: ICD-10-CM

## 2018-07-10 DIAGNOSIS — I71.40 AAA (ABDOMINAL AORTIC ANEURYSM) (H): ICD-10-CM

## 2018-07-10 PROCEDURE — 93880 EXTRACRANIAL BILAT STUDY: CPT

## 2018-07-10 PROCEDURE — 93978 VASCULAR STUDY: CPT

## 2018-07-12 ENCOUNTER — TELEPHONE (OUTPATIENT)
Dept: OTHER | Facility: CLINIC | Age: 78
End: 2018-07-12

## 2018-07-12 ENCOUNTER — TRANSFERRED RECORDS (OUTPATIENT)
Dept: HEALTH INFORMATION MANAGEMENT | Facility: CLINIC | Age: 78
End: 2018-07-12

## 2018-07-12 ENCOUNTER — OFFICE VISIT (OUTPATIENT)
Dept: CARDIOLOGY | Facility: CLINIC | Age: 78
End: 2018-07-12
Attending: NURSE PRACTITIONER
Payer: MEDICARE

## 2018-07-12 VITALS
DIASTOLIC BLOOD PRESSURE: 70 MMHG | HEIGHT: 66 IN | SYSTOLIC BLOOD PRESSURE: 122 MMHG | OXYGEN SATURATION: 97 % | WEIGHT: 217 LBS | HEART RATE: 45 BPM | BODY MASS INDEX: 34.87 KG/M2

## 2018-07-12 DIAGNOSIS — Z95.1 S/P CABG (CORONARY ARTERY BYPASS GRAFT): ICD-10-CM

## 2018-07-12 DIAGNOSIS — R06.02 SOB (SHORTNESS OF BREATH): Primary | ICD-10-CM

## 2018-07-12 DIAGNOSIS — I50.22 CHRONIC SYSTOLIC CONGESTIVE HEART FAILURE (H): ICD-10-CM

## 2018-07-12 DIAGNOSIS — I71.40 ABDOMINAL AORTIC ANEURYSM (H): Primary | ICD-10-CM

## 2018-07-12 DIAGNOSIS — R06.02 SOB (SHORTNESS OF BREATH): ICD-10-CM

## 2018-07-12 DIAGNOSIS — R94.31 ABNORMAL ELECTROCARDIOGRAM: Primary | ICD-10-CM

## 2018-07-12 LAB
ALT SERPL W P-5'-P-CCNC: 28 U/L (ref 0–70)
ANION GAP SERPL CALCULATED.3IONS-SCNC: 4 MMOL/L (ref 3–14)
BUN SERPL-MCNC: 15 MG/DL (ref 7–30)
CALCIUM SERPL-MCNC: 9 MG/DL (ref 8.5–10.1)
CHLORIDE SERPL-SCNC: 106 MMOL/L (ref 94–109)
CHOLEST SERPL-MCNC: 128 MG/DL
CO2 SERPL-SCNC: 29 MMOL/L (ref 20–32)
CREAT SERPL-MCNC: 1.01 MG/DL (ref 0.66–1.25)
GFR SERPL CREATININE-BSD FRML MDRD: 72 ML/MIN/1.7M2
GLUCOSE SERPL-MCNC: 111 MG/DL (ref 70–99)
HDLC SERPL-MCNC: 36 MG/DL
LDLC SERPL CALC-MCNC: 59 MG/DL
NONHDLC SERPL-MCNC: 92 MG/DL
POTASSIUM SERPL-SCNC: 4 MMOL/L (ref 3.4–5.3)
SODIUM SERPL-SCNC: 139 MMOL/L (ref 133–144)
TRIGL SERPL-MCNC: 165 MG/DL

## 2018-07-12 PROCEDURE — 36415 COLL VENOUS BLD VENIPUNCTURE: CPT | Performed by: NURSE PRACTITIONER

## 2018-07-12 PROCEDURE — 93000 ELECTROCARDIOGRAM COMPLETE: CPT | Performed by: INTERNAL MEDICINE

## 2018-07-12 PROCEDURE — 80048 BASIC METABOLIC PNL TOTAL CA: CPT | Performed by: NURSE PRACTITIONER

## 2018-07-12 PROCEDURE — 80061 LIPID PANEL: CPT | Performed by: NURSE PRACTITIONER

## 2018-07-12 PROCEDURE — 99214 OFFICE O/P EST MOD 30 MIN: CPT | Performed by: INTERNAL MEDICINE

## 2018-07-12 PROCEDURE — 84460 ALANINE AMINO (ALT) (SGPT): CPT | Performed by: NURSE PRACTITIONER

## 2018-07-12 RX ORDER — ASPIRIN 81 MG/1
81 TABLET ORAL DAILY
Qty: 1 TABLET | Refills: 0 | Status: ON HOLD | COMMUNITY
Start: 2018-07-12 | End: 2019-07-24

## 2018-07-12 NOTE — LETTER
7/12/2018    Tu Reyes MD, MD  [address] [place] [address]    RE: Fausto Farr       Dear Colleague,    I had the pleasure of seeing Fausto Farr in the [facility].    HPI and Plan:   See dictation    Orders Placed This Encounter   Procedures     Follow-Up with Cardiac Advanced Practice Provider     EKG 12-lead complete w/read - Clinics (performed today)     Exercise Stress Echocardiogram     General PFT Lab (Please always keep checked)     Orders Placed This Encounter   Medications     aspirin 81 MG EC tablet     Sig: Take 1 tablet (81 mg) by mouth daily     Dispense:  1 tablet     Refill:  0     Medications Discontinued During This Encounter   Medication Reason     mesalamine (ASACOL HD) 800 MG EC tablet Medication Reconciliation Clean Up         Encounter Diagnoses   Name Primary?     S/P CABG (coronary artery bypass graft)      Abnormal electrocardiogram Yes     SOB (shortness of breath)        CURRENT MEDICATIONS:  Current Outpatient Prescriptions   Medication Sig Dispense Refill     ACE/ARB/ARNI NOT PRESCRIBED, INTENTIONAL, Please choose reason not prescribed, below       aspirin 81 MG EC tablet Take 1 tablet (81 mg) by mouth daily 1 tablet 0     ASPIRIN NOT PRESCRIBED (INTENTIONAL) Please choose reason not prescribed, below 0 each 0     betamethasone valerate (VALISONE) 0.1 % lotion APPLY TOPICALLY TWICE DAILY PRN 60 mL 3     ezetimibe (ZETIA) 10 MG tablet Take 1 tablet (10 mg) by mouth daily 90 tablet 3     fluocinonide (LIDEX) 0.05 % ointment 2- 30 gram tubes.  Apply twice a day as needed. 60 g 2     furosemide (LASIX) 40 MG tablet Take 0.5 tablets (20 mg) by mouth daily 45 tablet 3     mesalamine (ASACOL HD) 800 MG EC tablet Take 1 tablet (800 mg) by mouth 2 times daily 180 tablet 3     metoprolol tartrate (LOPRESSOR) 25 MG tablet Take 1 tablet (25 mg) by mouth 2 times daily 180 tablet 3     Potassium Chloride ER 20 MEQ TBCR Take 1 tablet  (20 mEq) by mouth daily 30 tablet 1     rosuvastatin (CRESTOR) 40 MG tablet Take 1 tablet (40 mg) by mouth daily 90 tablet 3     tamsulosin (FLOMAX) 0.4 MG capsule Take 1 capsule (0.4 mg) by mouth daily 90 capsule 3     warfarin (COUMADIN) 5 MG tablet Take 1 1/2 tablets (7.5 mg) by mouth TWTFSS; 1 tablet (5 mg) on Mondays OR as directed by INR Clinic 135 tablet 3       ALLERGIES     Allergies   Allergen Reactions     Bees Anaphylaxis       PAST MEDICAL HISTORY:  Past Medical History:   Diagnosis Date     Abdominal pain      Abnormal ECG     RBBB, 1st degree AVB, Left axis deviation     Anemia     currently taking iron     Arrhythmia     pac, pvc     Back pain     since 1980     Bruit      CAD (coronary artery disease)      Cellulitis 10/18/12     Cellulitis 05/2018    GrpB strep LLE cellulitis  negative RACHAEL for veg     Contact dermatitis and other eczema, due to unspecified cause      Diaphragmatic hernia without mention of obstruction or gangrene      Gastric ulcer      Glucose intolerance (impaired glucose tolerance)      Heart murmur 9/16/13    valvular heart disease     Hyperlipidaemia      Hypertension 8/6/13     Lumbago      Malaise and fatigue      Metabolic syndrome      Mobitz (type) I (Wenckebach's) atrioventricular block     and RBBB     Nocturia 10/18/12     Nocturia      Nonallopathic lesion of cervical region      Nonallopathic lesion of lumbar region      Nonallopathic lesion of pelvic region, not elsewhere classified      Nonallopathic lesion of rib cage      Nonallopathic lesion of sacral region      Paroxysmal atrial fibrillation (H) 10/18/12     Prostate cancer (H) 2008    radiation seed, XRT      PVD (peripheral vascular disease) (H)      Rotator cuff strain     and sprain     S/P aortic valve replacement 2006    developed perivalve leak and MS, therefore redo surg 2013     S/P CABG (coronary artery bypass graft) 2006    Lima-Lad, RA-Rca     Sciatica of left side     since 2000     Sepsis due to  group B Streptococcus (H) 5/19/2018     Sleep apnea     pt declined cpap     Ulcerative colitis (H)      Vitamin D deficiency        PAST SURGICAL HISTORY:  Past Surgical History:   Procedure Laterality Date     AORTIC VALVE REPLACEMENT  1/3/06    redo AVR SJM 21mm and SJM MVR 27mm in 2013SJM 21(AGFN [id number]):AVR, SJM 27 :MVR-     ARTHROPLASTY KNEE      right knee     BACK SURGERY  Oct 2015    Fusion L4-5, laminectomy L2, L3     BYPASS GRAFT ARTERY CORONARY  10/2013    reimplantation of radial artery graft to RCA     C CABG, VEIN, TWO  1/3/06    Left radial to RCA, LIMA to LAD (RA to RCA reimplanted at time of 2013 surg)     CARDIAC CATHERIZATION  11/2005    Stent placed to RCA     CARDIAC CATHERIZATION  04/2013    Occluded RCA, patent LIMA to LAD and radial graft to PDA     CARPAL TUNNEL RELEASE RT/LT  1994     COLONOSCOPY  8-22-11     CYSTOSCOPY FLEXIBLE  10/16/2013    Procedure: CYSTOSCOPY FLEXIBLE;  FLEXIBLE CYSTOSCOPY / DILATION OF URETHRA / INSERTION OF LESLIE;  Surgeon: Cooper Wallace MD;  Location:  OR     ENDOVASCULAR REPAIR ANEURYSM ABDOMINAL AORTA  2006     ENDOVASCULAR REPAIR, INFRARENAL ABDOMINAL AORTIC ANEURYSM/DISSECTION; MODULAR BIFURCATED PROSTHESIS      AAA repair endovascular     ENT SURGERY       GENITOURINARY SURGERY  6/16/08    radioactive seeding     HEAD & NECK SURGERY  1997    vocal cord polypectomy     KNEE SURGERY  2001 Right knee arthroscopy     OPTICAL TRACKING SYSTEM FUSION SPINE POSTERIOR LUMBAR THREE+ LEVELS N/A 10/29/2015    Procedure: OPTICAL TRACKING SYSTEM FUSION SPINE POSTERIOR LUMBAR THREE+ LEVELS;  Surgeon: Walt Garcia MD;  Location:  OR     PROSTATE SURGERY  06/16/2008 Radioactive seeding     PROSTATE SURGERY      radioactive seeding 6/16/08     REPAIR ANEURYSM ABDOMINAL AORTA  06/08     REPAIR VALVE MITRAL  10/16/2013    [facility] 21(AGFN [id number]):AVR, [facility] 27  501:MVRProcedure: REPAIR VALVE MITRAL;  REDO STERNOTOMY/REDO AORTIC VALVE REPLACEMENT/ MITRAL VALVE  "REPLACEMENT/REIMPLANTATION OF RIGHT CORONARY ARTERY BYPASS WITH RACHAEL ( ON PUMP);  Surgeon: Viet Singh MD;  Location:  OR     REPLACE VALVE AORTIC  10/16/2013    Procedure: REPLACE VALVE AORTIC;;  Surgeon: Viet Singh MD;  Location:  OR     SURGERY GENERAL IP CONSULT  05/2008 Excision aneurysm abdominal aorta     SURGERY GENERAL IP CONSULT  1997 Vocal cord polypectomy     VASCULAR SURGERY  1968, 1993     varicose vein stripping       FAMILY HISTORY:  Family History   Problem Relation Age of Onset     Coronary Artery Disease Father      CABG     HEART DISEASE Father      Pacemaker     Other Cancer Daughter      HEART DISEASE Brother      Other - See Comments Grandchild        SOCIAL HISTORY:  Social History     Social History     Marital status:      Spouse name: N/A     Number of children: N/A     Years of education: N/A     Social History Main Topics     Smoking status: Former Smoker     Packs/day: 1.00     Years: 40.00     Quit date: 10/23/2002     Smokeless tobacco: Never Used     Alcohol use Yes      Comment: a couple beers per week     Drug use: No     Sexual activity: No     Other Topics Concern     Parent/Sibling W/ Cabg, Mi Or Angioplasty Before 65f 55m? Yes     Brother had bypass at 55     Caffeine Concern No     6-8 cups of half and half per day     Special Diet No     Exercise No     Social History Narrative       Review of Systems:  Skin:  Negative     Eyes:  Positive for glasses  ENT:  Negative hearing loss  Respiratory:  Positive for dyspnea on exertion  Cardiovascular:  Negative    Gastroenterology: not assessed    Genitourinary:  not assessed    Musculoskeletal:  Positive for back pain  Neurologic:  not assessed    Psychiatric:  not assessed    Heme/Lymph/Imm:  not assessed    Endocrine:  not assessed      Physical Exam:  Vitals: /70 (BP Location: Right arm, Patient Position: Sitting, Cuff Size: Adult Large)  Pulse (!) 45  Ht 1.664 m (5' 5.5\")  Wt 98.4 kg " (217 lb)  SpO2 97%  BMI 35.56 kg/m2    Constitutional:           Skin:             Head:           Eyes:           Lymph:      ENT:           Neck:           Respiratory:            Cardiac:                                                           GI:           Extremities and Muscular Skeletal:                 Neurological:           Psych:         Recent Lab Results:  LIPID RESULTS:  Lab Results   Component Value Date    CHOL 128 07/12/2018    HDL 36 (L) 07/12/2018    LDL 59 07/12/2018    TRIG 165 (H) 07/12/2018    CHOLHDLRATIO 3.6 06/03/2015       LIVER ENZYME RESULTS:  Lab Results   Component Value Date    AST 37 05/29/2018    ALT 28 07/12/2018       CBC RESULTS:  Lab Results   Component Value Date    WBC 10.1 05/31/2018    RBC 4.68 05/31/2018    HGB 14.0 05/31/2018    HCT 43.2 05/31/2018    MCV 92 05/31/2018    MCH 29.9 05/31/2018    MCHC 32.4 05/31/2018    RDW 13.3 05/31/2018     05/31/2018       BMP RESULTS:  Lab Results   Component Value Date     07/12/2018    POTASSIUM 4.0 07/12/2018    CHLORIDE 106 07/12/2018    CO2 29 07/12/2018    ANIONGAP 4 07/12/2018     (H) 07/12/2018    BUN 15 07/12/2018    CR 1.01 07/12/2018    GFRESTIMATED 72 07/12/2018    GFRESTBLACK 87 07/12/2018    AWILDA 9.0 07/12/2018        A1C RESULTS:  Lab Results   Component Value Date    A1C 5.9 04/25/2017       INR RESULTS:  Lab Results   Component Value Date    INR 2.3 (A) 07/09/2018    INR 3.3 (A) 05/31/2018    INR 2.53 (H) 05/22/2018    INR 2.26 (H) 05/21/2018           CC  ADELE Maynard CNP  [address]  [place] [address]    Thank you for allowing me to participate in the care of your patient.      Sincerely,     Calderon Santo MD     [facility]    cc:   ADELE Maynard CNP  [address]  [place] [address]

## 2018-07-12 NOTE — LETTER
7/12/2018      Tu Reyes MD, MD  [address] [place] [address]      RE: Fausto Farr       Dear Colleague,    I had the pleasure of seeing Fausto Farr in the [facility].    Service Date: 07/12/2018      HISTORY OF PRESENT ILLNESS:  I had the pleasure of following up on our mutual patient, Fausto Farr.  Today's visit was actually much more complicated than I expected.  It ended up being a 45 minute visit.  Please see my full note from 07/27/2017.  The patient had no recollection that he saw me last year, but I reviewed that with him.  It turns out he was in the hospital in May of this year with group B strep left leg cellulitis.  During that hospitalization they mentioned on the discharge summary that he was in atrial fib, but all of the EKGs I can see are actually sinus rhythm or tachybrady and first-degree heart block and right bundle branch block.  I am going to come back to that in a moment -- it is important.  In the hospital, they did a transesophageal echo that showed his 2 metal valves are working fine with no vegetations.  They reconfirmed preserved LV function.  They reconfirmed decreased RV function.  Last year when I had seen him and the year before that, I noted the right heart was enlarged and I suggested that he be seen for possible sleep apnea.  He initially declined CPAP which was recommended, but now he is on some form of CPAP.  In the hospital, the patient tells me they discontinued his aspirin.  There is no mention in the discharge summary why they did that, and he reports specifically reports no bleeding problems.  They also mentioned some V-tach, but as I mentioned, they also mentioned atrial fib but I certainly did not see any on at least the EKGs submitted.  Also, the patient is telling me that he does not feel any less fatigued since he started CPAP.  He does complain about increasing shortness of breath, particularly with  exertion.  He mentioned in passing once he had some funny vision when he was trying to read the newspaper.  This was relatively recent, a few weeks ago.  The reason this is important as it turns out he was on vacation and he admits that he forgot to take some of his Coumadin pills.  His INR that was checked recently was subtherapeutic at 2.3.  His goal is 2.5-3.5, and as I mentioned above, he is off aspirin.  I do not know if that represents any type of embolic event or not, but he was reinstated on his Coumadin and I am going to put him back on baby aspirin.  I told him if he has any more of those visual episodes, he needs to us.  The next thing is regarding his exertional shortness of breath.  He is clearly overweight, although he weighs less this year than he did last year, but he has first-degree heart block, right bundle branch block and a little bit tachybrady and he is on low-dose beta blocker.  I am going to order an EKG stress test.  I would like to see what his heart rate and blood pressure do with exercise.  I would also like to see generally his exercise fitness or his aerobic capacity.  I am not doing the test for ischemia, even though he has a history of coronary disease.  The reason I am doing the test again is to determine if he has chronotropic incompetence or develops higher degrees of AV block, in which case we might need to lower the beta blocker or place a pacemaker.  We reviewed his lipid profile and his electrolytes today.  He clearly has metabolic syndrome, and we talked in depth about that and his wife acknowledged that he is not eating well, he is overweight and he is sedentary.  This is not a new problem.  We reviewed the lipids.  We are going to keep him on his Zetia and Crestor.  I reminded him again, if he has any kind of procedure that the Coumadin has to be interrupted, we must be involved because he is at higher risk for clot with 2 metal valves and then a question if he really did  have atrial fib or not, although again, as I mentioned, I did not see any actual proof of that.  I had also mentioned that if for any reason he comes off the Coumadin and has to be bridged for any type of procedure, that might be an ideal time for us to consider doing a right heart catheterization given his right ventricular enlargement, we could see if it is due to LV elevated filling pressures or pulmonary hypertension.  He is an ex-smoker.  I am not going to order the right heart cath otherwise, but certainly if the opportunity presents itself when he is off Coumadin, that would be an ideal time to do it, and I again stressed this to the family and his wife in particular because, as I mentioned, he did not remember seeing me at all last year.        Today's visit was 40 minutes, all counseling.      Calderon Schaeffer MD       cc:   Tu Reyes MD    [address]  [address]       Johnnie Osborne MD    [facility]    [address] [place] [address], [address]    [place]  [address]         CALDERON SCHAEFFER MD             D: 2018   T: 2018   MT: SHLOMO      Name:     LIANG VAUGHN   MRN:      [id number]        Account:      [id number]   :      1940           Service Date: 2018      Document: [id number]         Outpatient Encounter Prescriptions as of 2018   Medication Sig Dispense Refill     ACE/ARB/ARNI NOT PRESCRIBED, INTENTIONAL, Please choose reason not prescribed, below       aspirin 81 MG EC tablet Take 1 tablet (81 mg) by mouth daily 1 tablet 0     ASPIRIN NOT PRESCRIBED (INTENTIONAL) Please choose reason not prescribed, below 0 each 0     betamethasone valerate (VALISONE) 0.1 % lotion APPLY TOPICALLY TWICE DAILY PRN 60 mL 3     ezetimibe (ZETIA) 10 MG tablet Take 1 tablet (10 mg) by mouth daily 90 tablet 3     fluocinonide (LIDEX) 0.05 % ointment 2- 30 gram tubes.  Apply twice a day as needed. 60 g 2      furosemide (LASIX) 40 MG tablet Take 0.5 tablets (20 mg) by mouth daily 45 tablet 3     mesalamine (ASACOL HD) 800 MG EC tablet Take 1 tablet (800 mg) by mouth 2 times daily 180 tablet 3     metoprolol tartrate (LOPRESSOR) 25 MG tablet Take 1 tablet (25 mg) by mouth 2 times daily 180 tablet 3     Potassium Chloride ER 20 MEQ TBCR Take 1 tablet (20 mEq) by mouth daily 30 tablet 1     rosuvastatin (CRESTOR) 40 MG tablet Take 1 tablet (40 mg) by mouth daily 90 tablet 3     tamsulosin (FLOMAX) 0.4 MG capsule Take 1 capsule (0.4 mg) by mouth daily 90 capsule 3     warfarin (COUMADIN) 5 MG tablet Take 1 1/2 tablets (7.5 mg) by mouth TWTFSS; 1 tablet (5 mg) on Mondays OR as directed by INR Clinic 135 tablet 3     [DISCONTINUED] mesalamine (ASACOL HD) 800 MG EC tablet TAKE 1 TABLET BY MOUTH 2 TIMES DAILY 180 tablet 3     No facility-administered encounter medications on file as of 7/12/2018.      Again, thank you for allowing me to participate in the care of your patient.      Sincerely,    Calderon Santo MD     [facility]

## 2018-07-12 NOTE — PROGRESS NOTES
Service Date: 07/12/2018      HISTORY OF PRESENT ILLNESS:  I had the pleasure of following up on our mutual patient, Fausto Farr.  Today's visit was actually much more complicated than I expected.  It ended up being a 45 minute visit.  Please see my full note from 07/27/2017.  The patient had no recollection that he saw me last year, but I reviewed that with him.  It turns out he was in the hospital in May of this year with group B strep left leg cellulitis.  During that hospitalization they mentioned on the discharge summary that he was in atrial fib, but all of the EKGs I can see are actually sinus rhythm or tachybrady and first-degree heart block and right bundle branch block.  I am going to come back to that in a moment -- it is important.  In the hospital, they did a transesophageal echo that showed his 2 metal valves are working fine with no vegetations.  They reconfirmed preserved LV function.  They reconfirmed decreased RV function.  Last year when I had seen him and the year before that, I noted the right heart was enlarged and I suggested that he be seen for possible sleep apnea.  He initially declined CPAP which was recommended, but now he is on some form of CPAP.  In the hospital, the patient tells me they discontinued his aspirin.  There is no mention in the discharge summary why they did that, and he reports specifically reports no bleeding problems.  They also mentioned some V-tach, but as I mentioned, they also mentioned atrial fib but I certainly did not see any on at least the EKGs submitted.  Also, the patient is telling me that he does not feel any less fatigued since he started CPAP.  He does complain about increasing shortness of breath, particularly with exertion.  He mentioned in passing once he had some funny vision when he was trying to read the newspaper.  This was relatively recent, a few weeks ago.  The reason this is important as it turns out he was on vacation and he admits that he  forgot to take some of his Coumadin pills.  His INR that was checked recently was subtherapeutic at 2.3.  His goal is 2.5-3.5, and as I mentioned above, he is off aspirin.  I do not know if that represents any type of embolic event or not, but he was reinstated on his Coumadin and I am going to put him back on baby aspirin.  I told him if he has any more of those visual episodes, he needs to us.  The next thing is regarding his exertional shortness of breath.  He is clearly overweight, although he weighs less this year than he did last year, but he has first-degree heart block, right bundle branch block and a little bit tachybrady and he is on low-dose beta blocker.  I am going to order an EKG stress test.  I would like to see what his heart rate and blood pressure do with exercise.  I would also like to see generally his exercise fitness or his aerobic capacity.  I am not doing the test for ischemia, even though he has a history of coronary disease.  The reason I am doing the test again is to determine if he has chronotropic incompetence or develops higher degrees of AV block, in which case we might need to lower the beta blocker or place a pacemaker.  We reviewed his lipid profile and his electrolytes today.  He clearly has metabolic syndrome, and we talked in depth about that and his wife acknowledged that he is not eating well, he is overweight and he is sedentary.  This is not a new problem.  We reviewed the lipids.  We are going to keep him on his Zetia and Crestor.  I reminded him again, if he has any kind of procedure that the Coumadin has to be interrupted, we must be involved because he is at higher risk for clot with 2 metal valves and then a question if he really did have atrial fib or not, although again, as I mentioned, I did not see any actual proof of that.  I had also mentioned that if for any reason he comes off the Coumadin and has to be bridged for any type of procedure, that might be an ideal  time for us to consider doing a right heart catheterization given his right ventricular enlargement, we could see if it is due to LV elevated filling pressures or pulmonary hypertension.  He is an ex-smoker.  I am not going to order the right heart cath otherwise, but certainly if the opportunity presents itself when he is off Coumadin, that would be an ideal time to do it, and I again stressed this to the family and his wife in particular because, as I mentioned, he did not remember seeing me at all last year.        Today's visit was 40 minutes, all counseling.      Calderon Schaeffer MD       cc:   [place] [address]       Johnnie Osborne MD    [facility]    [address] [place] [address], [address]    [place]  [address]         CALDERON SCHAEFFER MD             D: 2018   T: 2018   MT: SHLOMO      Name:     LIANG VAUGHN   MRN:      [id number]        Account:      [id number]   :      1940           Service Date: 2018      Document: [id number]

## 2018-07-12 NOTE — MR AVS SNAPSHOT
After Visit Summary   7/12/2018    Fausto Farr    MRN: [id number]           Patient Information     Date Of Birth          1940        Visit Information        Provider Department      7/12/2018 9:00 AM Calderon Santo MD [facility]        Today's Diagnoses     Abnormal electrocardiogram    -  1    S/P CABG (coronary artery bypass graft)        SOB (shortness of breath)           Follow-ups after your visit        Additional Services     Follow-Up with Cardiac Advanced Practice Provider       Any  np to follow up on PFT and ecg stress test (NOT STRESS ECHO)                  Your next 10 appointments already scheduled     Jul 13, 2018 10:00 AM CDT   Cardiac Stress Test with RH TREADMILL   [facility] ([facility])    [address] [place] [id number] [phone]           1. Please bring or wear a comfortable two-piece outfit and walking shoes. 2. Stop eating 3 hours before the test. You may drink water or juice. 3. Stop all caffeine 12 hours before the test. This includes coffee, tea, soda pop, chocolate and certain medicines (such as Anacin and Excederin). Also avoid decaf coffee and tea, as these contain small amounts of caffeine. 4. No alcohol, smoking or use of other tobacco products for 12 hours before the test. 5. Refer to your provider instructions to see if you need to stop any medications (such as beta-blockers or nitrates) for this test. 6. For patients with diabetes: - If you take insulin, call your diabetes care team. Ask if you should take a   dose the morning of your test. - If you take diabetes medicine by mouth, don't take it on the morning of your test. Bring it with you to take after the test. (If you have questions, call your diabetes care team) 7. When you arrive, please tell us if: -You have diabetes. -You have taken Viagra, Cialis or Levitra in the past 48 hours. 8. For any  questions that cannot be answered, please contact the ordering physician            Jul 23, 2018  8:00 AM CDT   Return Visit with ADELE Maynard CNP   [facility] ([facility])    [address]  [facility] [id number] [phone]            Jul 30, 2018  8:30 AM CDT   Anticoagulation Visit with EA ANTICOAGULATION CLINIC   [facility] ([facility])    [address] [place] [address]  [address]  [facility] [id number] [phone]            Oct 19, 2018  9:00 AM CDT   Return Sleep Patient with Isrrael Dobbins MD   [facility] ([facility])    [address]  [facility] [id number] [phone]              Future tests that were ordered for you today     Open Future Orders        Priority Expected Expires Ordered    General PFT Lab (Please always keep checked) Routine 7/16/2018 7/12/2019 7/12/2018    Exercise Stress Echocardiogram Routine 7/19/2018 7/12/2019 7/12/2018    Follow-Up with Cardiac Advanced Practice Provider Routine 7/19/2018 7/12/2019 7/12/2018            Who to contact     If you have questions or need follow up information about today's clinic visit or your schedule please contact [facility] directly at [phone].  Normal or non-critical lab and imaging results will be communicated to you by MyChart, letter or phone within 4 business days after the clinic has received the results. If you do not hear from us within 7 days, please contact the clinic through MyChart or phone. If you have a critical or abnormal lab result, we will notify you by phone as soon as possible.  Submit refill requests through [employer] or call your pharmacy and they will forward the refill request to us. Please allow 3 business days for your refill to be completed.          Additional Information About Your Visit        Care  "EveryWhere ID     This is your Care EveryWhere ID. This could be used by other organizations to access your [facility] medical records  [id number]        Your Vitals Were     Pulse Height Pulse Oximetry BMI (Body Mass Index)          45 1.664 m (5' 5.5\") 97% 35.56 kg/m2         Blood Pressure from Last 3 Encounters:   07/12/18 122/70   05/31/18 124/72   05/31/18 128/79    Weight from Last 3 Encounters:   07/12/18 98.4 kg (217 lb)   05/31/18 97.5 kg (215 lb)   05/22/18 100.2 kg (220 lb 14.4 oz)              We Performed the Following     EKG 12-lead complete w/read - Clinics (performed today)     Follow-Up with Cardiologist        Primary Care Provider Office Phone # Fax #    Tu Reyes [phone] [address] [place] [address]        Equal Access to Services     [facility] : Hadii aad ku hadasho Soomaali, waaxda luqadaha, qaybta kaalmada adeegyada, waxay silvinoin hayelisabethn moira blake . So [facility] [phone].    ATENCIÓN: Si habla español, tiene a brown disposición servicios gratuitos de asistencia lingüística. Llame al [phone].    We comply with applicable federal civil rights laws and [place] laws. We do not discriminate on the basis of race, color, national origin, age, disability, sex, sexual orientation, or gender identity.            Thank you!     Thank you for choosing [facility]  for your care. Our goal is always to provide you with excellent care. Hearing back from our patients is one way we can continue to improve our services. Please take a few minutes to complete the written survey that you may receive in the mail after your visit with us. Thank you!             Your Updated Medication List - Protect others around you: Learn how to safely use, store and throw away your medicines at www.disposemymeds.org.          This list is accurate as of 7/12/18 10:08 AM.  Always use your most recent med list.          "          Brand Name Dispense Instructions for use Diagnosis    ACE/ARB/ARNI NOT PRESCRIBED (INTENTIONAL)      Please choose reason not prescribed, below    Vitamin D deficiency, Left leg cellulitis, Essential hypertension, benign, Atrial fibrillation, unspecified type (H)       aspirin 81 MG EC tablet     1 tablet    Take 1 tablet (81 mg) by mouth daily        ASPIRIN NOT PRESCRIBED    INTENTIONAL    0 each    Please choose reason not prescribed, below    Long-term (current) use of anticoagulants, S/P aortic valve replacement       betamethasone valerate 0.1 % lotion    VALISONE    60 mL    APPLY TOPICALLY TWICE DAILY PRN    Eczema, unspecified type       ezetimibe 10 MG tablet    ZETIA    90 tablet    Take 1 tablet (10 mg) by mouth daily    Mixed hyperlipidemia       fluocinonide 0.05 % ointment    LIDEX    60 g    2- 30 gram tubes.  Apply twice a day as needed.    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)       metoprolol tartrate 25 MG tablet    LOPRESSOR    180 tablet    Take 1 tablet (25 mg) by mouth 2 times daily    Coronary artery disease involving coronary bypass graft 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       Potassium Chloride ER 20 MEQ Tbcr     30 tablet    Take 1 tablet (20 mEq) by mouth daily    Vitamin D deficiency, Left leg cellulitis, Essential hypertension, benign, Atrial fibrillation, unspecified type (H)       rosuvastatin 40 MG tablet    CRESTOR    90 tablet    Take 1 tablet (40 mg) by mouth daily    Hyperlipidemia, unspecified hyperlipidemia type       tamsulosin 0.4 MG capsule    FLOMAX    90 capsule    Take 1 capsule (0.4 mg) by mouth daily    Urinary retention       warfarin 5 MG tablet    COUMADIN    135  tablet    Take 1 1/2 tablets (7.5 mg) by mouth TWTFSS; 1 tablet (5 mg) on Mondays OR as directed by INR Clinic    Long-term (current) use of anticoagulants, S/P aortic valve replacement

## 2018-07-12 NOTE — TELEPHONE ENCOUNTER
Called patient with ultrasound results done on 7/10/2018.   Discussed aneurysm sac size and possible increase. Reviewed with Dr. Osborne Unable to accurately measure with ultrasound will need CT scan.  This can be done without contrast.  Pt agreed to plan.  [facility] will call patient to schedule.  Will review with Dr. Osborne and call patient with results.  Yesy Rao RN  IR nurse clinician  [phone]  ULTRASOUND AORTA/IVC/ILIAC DUPLEX COMPLETE  7/10/2018 9:20 AM     HISTORY:  78-year-old patient with endovascular repair of abdominal  aortic aneurysm performed in 2008.     COMPARISON: April 29, 2015.     FINDINGS: The supraceliac aorta is not visible due to overlying bowel  gas. At the proximal fixation point, diameter is 3 cm AP x 2.9 cm  transverse. Stent graft is visible within an infrarenal abdominal  aortic aneurysm measuring up to 7 cm AP x 6 cm transverse on today's  exam, 5.4 cm AP x 6.4 cm transverse. The right common iliac artery  measures up to 1.2 x 1.4 cm, and the left common iliac artery measures  up to 2 x 2.4 cm, previously 1.4 x 1.5 cm. The right external iliac  artery measures up to 1.2 cm and left measures up to 0.9 cm. No  obvious visible endoleak.         IMPRESSION:  1. Patent aortobiiliac stent graft with aneurysmal sac size of 7 cm AP  x 6 cm transverse, previously 5.8 cm AP x 6.4 cm transverse in 2015.  2. 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  3. No obvious visible endoleak.     HARJIT STANLEY MD

## 2018-07-12 NOTE — PROGRESS NOTES
HPI and Plan:   See dictation    Orders Placed This Encounter   Procedures     Follow-Up with Cardiac Advanced Practice Provider     EKG 12-lead complete w/read - Clinics (performed today)     Exercise Stress Echocardiogram     General PFT Lab (Please always keep checked)     Orders Placed This Encounter   Medications     aspirin 81 MG EC tablet     Sig: Take 1 tablet (81 mg) by mouth daily     Dispense:  1 tablet     Refill:  0     Medications Discontinued During This Encounter   Medication Reason     mesalamine (ASACOL HD) 800 MG EC tablet Medication Reconciliation Clean Up         Encounter Diagnoses   Name Primary?     S/P CABG (coronary artery bypass graft)      Abnormal electrocardiogram Yes     SOB (shortness of breath)        CURRENT MEDICATIONS:  Current Outpatient Prescriptions   Medication Sig Dispense Refill     ACE/ARB/ARNI NOT PRESCRIBED, INTENTIONAL, Please choose reason not prescribed, below       aspirin 81 MG EC tablet Take 1 tablet (81 mg) by mouth daily 1 tablet 0     ASPIRIN NOT PRESCRIBED (INTENTIONAL) Please choose reason not prescribed, below 0 each 0     betamethasone valerate (VALISONE) 0.1 % lotion APPLY TOPICALLY TWICE DAILY PRN 60 mL 3     ezetimibe (ZETIA) 10 MG tablet Take 1 tablet (10 mg) by mouth daily 90 tablet 3     fluocinonide (LIDEX) 0.05 % ointment 2- 30 gram tubes.  Apply twice a day as needed. 60 g 2     furosemide (LASIX) 40 MG tablet Take 0.5 tablets (20 mg) by mouth daily 45 tablet 3     mesalamine (ASACOL HD) 800 MG EC tablet Take 1 tablet (800 mg) by mouth 2 times daily 180 tablet 3     metoprolol tartrate (LOPRESSOR) 25 MG tablet Take 1 tablet (25 mg) by mouth 2 times daily 180 tablet 3     Potassium Chloride ER 20 MEQ TBCR Take 1 tablet (20 mEq) by mouth daily 30 tablet 1     rosuvastatin (CRESTOR) 40 MG tablet Take 1 tablet (40 mg) by mouth daily 90 tablet 3     tamsulosin (FLOMAX) 0.4 MG capsule Take 1 capsule (0.4 mg) by mouth daily 90 capsule 3     warfarin  (COUMADIN) 5 MG tablet Take 1 1/2 tablets (7.5 mg) by mouth TWTFSS; 1 tablet (5 mg) on Mondays OR as directed by INR Clinic 135 tablet 3       ALLERGIES     Allergies   Allergen Reactions     Bees Anaphylaxis       PAST MEDICAL HISTORY:  Past Medical History:   Diagnosis Date     Abdominal pain      Abnormal ECG     RBBB, 1st degree AVB, Left axis deviation     Anemia     currently taking iron     Arrhythmia     pac, pvc     Back pain     since 1980     Bruit      CAD (coronary artery disease)      Cellulitis 10/18/12     Cellulitis 05/2018    GrpB strep LLE cellulitis  negative RACHAEL for veg     Contact dermatitis and other eczema, due to unspecified cause      Diaphragmatic hernia without mention of obstruction or gangrene      Gastric ulcer      Glucose intolerance (impaired glucose tolerance)      Heart murmur 9/16/13    valvular heart disease     Hyperlipidaemia      Hypertension 8/6/13     Lumbago      Malaise and fatigue      Metabolic syndrome      Mobitz (type) I (Wenckebach's) atrioventricular block     and RBBB     Nocturia 10/18/12     Nocturia      Nonallopathic lesion of cervical region      Nonallopathic lesion of lumbar region      Nonallopathic lesion of pelvic region, not elsewhere classified      Nonallopathic lesion of rib cage      Nonallopathic lesion of sacral region      Paroxysmal atrial fibrillation (H) 10/18/12     Prostate cancer (H) 2008    radiation seed, XRT      PVD (peripheral vascular disease) (H)      Rotator cuff strain     and sprain     S/P aortic valve replacement 2006    developed perivalve leak and MS, therefore redo surg 2013     S/P CABG (coronary artery bypass graft) 2006    Lima-Lad, RA-Rca     Sciatica of left side     since 2000     Sepsis due to group B Streptococcus (H) 5/19/2018     Sleep apnea     pt declined cpap     Ulcerative colitis (H)      Vitamin D deficiency        PAST SURGICAL HISTORY:  Past Surgical History:   Procedure Laterality Date     AORTIC VALVE  REPLACEMENT  1/3/06    redo AVR SJM 21mm and M MVR 27mm in 2013SJ 21(AGFN [id number]):AVR, M 27 :MVR-     ARTHROPLASTY KNEE      right knee     BACK SURGERY  Oct 2015    Fusion L4-5, laminectomy L2, L3     BYPASS GRAFT ARTERY CORONARY  10/2013    reimplantation of radial artery graft to RCA     C CABG, VEIN, TWO  1/3/06    Left radial to RCA, LIMA to LAD (RA to RCA reimplanted at time of 2013 surg)     CARDIAC CATHERIZATION  11/2005    Stent placed to RCA     CARDIAC CATHERIZATION  04/2013    Occluded RCA, patent LIMA to LAD and radial graft to PDA     CARPAL TUNNEL RELEASE RT/LT  1994     COLONOSCOPY  8-22-11     CYSTOSCOPY FLEXIBLE  10/16/2013    Procedure: CYSTOSCOPY FLEXIBLE;  FLEXIBLE CYSTOSCOPY / DILATION OF URETHRA / INSERTION OF LESLIE;  Surgeon: Cooper Wallace MD;  Location: [facility] OR     ENDOVASCULAR REPAIR ANEURYSM ABDOMINAL AORTA  2006     ENDOVASCULAR REPAIR, INFRARENAL ABDOMINAL AORTIC ANEURYSM/DISSECTION; MODULAR BIFURCATED PROSTHESIS      AAA repair endovascular     ENT SURGERY       GENITOURINARY SURGERY  6/16/08    radioactive seeding     HEAD & NECK SURGERY  1997    vocal cord polypectomy     KNEE SURGERY  2001 Right knee arthroscopy     OPTICAL TRACKING SYSTEM FUSION SPINE POSTERIOR LUMBAR THREE+ LEVELS N/A 10/29/2015    Procedure: OPTICAL TRACKING SYSTEM FUSION SPINE POSTERIOR LUMBAR THREE+ LEVELS;  Surgeon: Walt Garcia MD;  Location:  OR     PROSTATE SURGERY  06/16/2008 Radioactive seeding     PROSTATE SURGERY      radioactive seeding 6/16/08     REPAIR ANEURYSM ABDOMINAL AORTA  06/08     REPAIR VALVE MITRAL  10/16/2013    [facility] 21(AGFN [id number]):AVR, [facility] 27 :MVRProcedure: REPAIR VALVE MITRAL;  REDO STERNOTOMY/REDO AORTIC VALVE REPLACEMENT/ MITRAL VALVE REPLACEMENT/REIMPLANTATION OF RIGHT CORONARY ARTERY BYPASS WITH RACHAEL ( ON PUMP);  Surgeon: Viet Singh MD;  Location:  OR     REPLACE VALVE AORTIC  10/16/2013    Procedure: REPLACE VALVE AORTIC;;  Surgeon: Viet Singh  "MD Prasanth;  Location: [facility] OR     SURGERY GENERAL IP CONSULT  05/2008 Excision aneurysm abdominal aorta     SURGERY GENERAL IP CONSULT  1997 Vocal cord polypectomy     VASCULAR SURGERY  1968, 1993     varicose vein stripping       FAMILY HISTORY:  Family History   Problem Relation Age of Onset     Coronary Artery Disease Father      CABG     HEART DISEASE Father      Pacemaker     Other Cancer Daughter      HEART DISEASE Brother      Other - See Comments Grandchild        SOCIAL HISTORY:  Social History     Social History     Marital status:      Spouse name: N/A     Number of children: N/A     Years of education: N/A     Social History Main Topics     Smoking status: Former Smoker     Packs/day: 1.00     Years: 40.00     Quit date: 10/23/2002     Smokeless tobacco: Never Used     Alcohol use Yes      Comment: a couple beers per week     Drug use: No     Sexual activity: No     Other Topics Concern     Parent/Sibling W/ Cabg, Mi Or Angioplasty Before 65f 55m? Yes     Brother had bypass at 55     Caffeine Concern No     6-8 cups of half and half per day     Special Diet No     Exercise No     Social History Narrative       Review of Systems:  Skin:  Negative     Eyes:  Positive for glasses  ENT:  Negative hearing loss  Respiratory:  Positive for dyspnea on exertion  Cardiovascular:  Negative    Gastroenterology: not assessed    Genitourinary:  not assessed    Musculoskeletal:  Positive for back pain  Neurologic:  not assessed    Psychiatric:  not assessed    Heme/Lymph/Imm:  not assessed    Endocrine:  not assessed      Physical Exam:  Vitals: /70 (BP Location: Right arm, Patient Position: Sitting, Cuff Size: Adult Large)  Pulse (!) 45  Ht 1.664 m (5' 5.5\")  Wt 98.4 kg (217 lb)  SpO2 97%  BMI 35.56 kg/m2    Constitutional:           Skin:             Head:           Eyes:           Lymph:      ENT:           Neck:           Respiratory:            Cardiac:                                             "               GI:           Extremities and Muscular Skeletal:                 Neurological:           Psych:         Recent Lab Results:  LIPID RESULTS:  Lab Results   Component Value Date    CHOL 128 07/12/2018    HDL 36 (L) 07/12/2018    LDL 59 07/12/2018    TRIG 165 (H) 07/12/2018    CHOLHDLRATIO 3.6 06/03/2015       LIVER ENZYME RESULTS:  Lab Results   Component Value Date    AST 37 05/29/2018    ALT 28 07/12/2018       CBC RESULTS:  Lab Results   Component Value Date    WBC 10.1 05/31/2018    RBC 4.68 05/31/2018    HGB 14.0 05/31/2018    HCT 43.2 05/31/2018    MCV 92 05/31/2018    MCH 29.9 05/31/2018    MCHC 32.4 05/31/2018    RDW 13.3 05/31/2018     05/31/2018       BMP RESULTS:  Lab Results   Component Value Date     07/12/2018    POTASSIUM 4.0 07/12/2018    CHLORIDE 106 07/12/2018    CO2 29 07/12/2018    ANIONGAP 4 07/12/2018     (H) 07/12/2018    BUN 15 07/12/2018    CR 1.01 07/12/2018    GFRESTIMATED 72 07/12/2018    GFRESTBLACK 87 07/12/2018    AWILDA 9.0 07/12/2018        A1C RESULTS:  Lab Results   Component Value Date    A1C 5.9 04/25/2017       INR RESULTS:  Lab Results   Component Value Date    INR 2.3 (A) 07/09/2018    INR 3.3 (A) 05/31/2018    INR 2.53 (H) 05/22/2018    INR 2.26 (H) 05/21/2018           ADELE Herring CNP  [address]  [place] [id number]

## 2018-07-13 ENCOUNTER — HOSPITAL ENCOUNTER (OUTPATIENT)
Dept: CARDIOLOGY | Facility: CLINIC | Age: 78
Discharge: HOME | End: 2018-07-13
Attending: INTERNAL MEDICINE | Admitting: INTERNAL MEDICINE
Payer: MEDICARE

## 2018-07-13 DIAGNOSIS — R94.31 ABNORMAL ELECTROCARDIOGRAM: ICD-10-CM

## 2018-07-13 DIAGNOSIS — R94.31 ABNORMAL ECG: ICD-10-CM

## 2018-07-13 DIAGNOSIS — R94.31 ABNORMAL ECG: Primary | ICD-10-CM

## 2018-07-13 PROCEDURE — 93017 CV STRESS TEST TRACING ONLY: CPT | Performed by: INTERNAL MEDICINE

## 2018-07-16 ENCOUNTER — HOSPITAL ENCOUNTER (OUTPATIENT)
Dept: RESPIRATORY THERAPY | Facility: CLINIC | Age: 78
Discharge: HOME | End: 2018-07-16
Attending: INTERNAL MEDICINE | Admitting: INTERNAL MEDICINE
Payer: MEDICARE

## 2018-07-16 ENCOUNTER — TELEPHONE (OUTPATIENT)
Dept: OTHER | Facility: CLINIC | Age: 78
End: 2018-07-16

## 2018-07-16 ENCOUNTER — HOSPITAL ENCOUNTER (OUTPATIENT)
Dept: CT IMAGING | Facility: CLINIC | Age: 78
Discharge: HOME | End: 2018-07-16
Attending: RADIOLOGY | Admitting: RADIOLOGY
Payer: MEDICARE

## 2018-07-16 DIAGNOSIS — R06.02 SOB (SHORTNESS OF BREATH): ICD-10-CM

## 2018-07-16 DIAGNOSIS — I71.40 ABDOMINAL AORTIC ANEURYSM (H): Primary | ICD-10-CM

## 2018-07-16 DIAGNOSIS — R06.00 DYSPNEA: Primary | ICD-10-CM

## 2018-07-16 DIAGNOSIS — I71.40 ABDOMINAL AORTIC ANEURYSM (H): ICD-10-CM

## 2018-07-16 LAB
DLCOCOR-%PRED-PRE: 76 %
DLCOCOR-PRE: 17.18 ML/MIN/MMHG
DLCOUNC-%PRED-PRE: 75 %
DLCOUNC-PRE: 16.88 ML/MIN/MMHG
DLCOUNC-PRED: 22.4 ML/MIN/MMHG
ERV-%PRED-PRE: 332 %
ERV-PRE: 0.63 L
ERV-PRED: 0.19 L
EXPTIME-PRE: 8.15 SEC
FEF2575-%PRED-PRE: 82 %
FEF2575-PRE: 1.56 L/SEC
FEF2575-PRED: 1.89 L/SEC
FEFMAX-%PRED-PRE: 130 %
FEFMAX-PRE: 8.44 L/SEC
FEFMAX-PRED: 6.49 L/SEC
FEV1-%PRED-PRE: 86 %
FEV1-PRE: 2.19 L
FEV1FEV6-PRE: 76 %
FEV1FEV6-PRED: 77 %
FEV1FVC-PRE: 76 %
FEV1FVC-PRED: 72 %
FEV1SVC-PRE: 68 %
FEV1SVC-PRED: 68 %
FIFMAX-PRE: 1.26 L/SEC
FRCPLETH-%PRED-PRE: 89 %
FRCPLETH-PRE: 3.09 L
FRCPLETH-PRED: 3.47 L
FVC-%PRED-PRE: 86 %
FVC-PRE: 2.89 L
FVC-PRED: 3.33 L
IC-%PRED-PRE: 72 %
IC-PRE: 2.56 L
IC-PRED: 3.53 L
RVPLETH-%PRED-PRE: 93 %
RVPLETH-PRE: 2.46 L
RVPLETH-PRED: 2.63 L
TLCPLETH-%PRED-PRE: 92 %
TLCPLETH-PRE: 5.66 L
TLCPLETH-PRED: 6.11 L
VA-%PRED-PRE: 81 %
VA-PRE: 4.76 L
VC-%PRED-PRE: 85 %
VC-PRE: 3.19 L
VC-PRED: 3.72 L

## 2018-07-16 PROCEDURE — 40000275 ZZH STATISTIC RCP TIME EA 10 MIN

## 2018-07-16 PROCEDURE — 74176 CT ABD & PELVIS W/O CONTRAST: CPT

## 2018-07-16 PROCEDURE — 94726 PLETHYSMOGRAPHY LUNG VOLUMES: CPT

## 2018-07-16 PROCEDURE — 94010 BREATHING CAPACITY TEST: CPT

## 2018-07-16 PROCEDURE — 94729 DIFFUSING CAPACITY: CPT

## 2018-07-16 NOTE — TELEPHONE ENCOUNTER
CT scan done today shows sac size stable.  Recommend annual follow up CT scan.  Pt notified of results.  Yesy Rao RN  IR nurse clinician  [phone]  7/16/18  8:07 AM [id number] [facility]    [employer] Interactive Report and InfoRx   View the interactive report   PACS Images   Show images for CT Abdomen Pelvis w/o Contrast   Study Result   CT ABDOMEN/PELVIS WITHOUT CONTRAST July 16, 2018 8:07 AM      HISTORY: Evaluate size of aneurysm sac status post endovascular  aneurysm repair done 4/2008. Recent ultrasound shows sac increase from  comparative. Abdominal aortic aneurysm (H).     TECHNIQUE: CT of the abdomen and pelvis was performed without  intravenous contrast. Radiation dose for this scan was reduced using  automated exposure control, adjustment of the mA and/or kV according  to patient size, or iterative reconstruction technique.     COMPARISON: CT of the abdomen/pelvis dated 6/22/2017.     FINDINGS: Again identified is an abdominal aortic endograft treated  with an endovascular stent graft. Maximum anterior-posterior and  transverse dimensions of the aneurysm sac are approximately 6.4 x 6.4  cm. These measurements are not significantly changed when compared to  the previous CT dated 6/22/2017.         IMPRESSION: No significant change in size of an infrarenal abdominal  aortic aneurysm. Suggest annual follow-up with a CT without contrast.     ANDERSON WOODS MD

## 2018-07-16 NOTE — PROGRESS NOTES
PFT Note:        Pt completed pulmonary function testing with DLCO.  Pt declined bronchodilator study.  Good Pt effort and cooperation.     Spirometry  Meets all ATS-ERS recommendations.    Plethysmography  All plethysmographic measurements meet ATS-ERS recommendations.    DLCO  Meets all ATS-ERS recommendations.  DLCO is an average of 2 maneuvers.  Predicted DLCO is corrected for a Hgb of 14 drawn on 5/31/2018.    July 16, 2018.9:18 AM  Jose A Garsia

## 2018-07-17 NOTE — PROCEDURES
Procedure Date: 2018      Please see medical chart for graphs and statistics related to this report.       REFERRING PHYSICIAN:   Calderon Santo            TECHNICIAN:   Jose A Garsia   DIAGNOSIS:   SOB, A-fib, PVD, history CAB, with AVR, HTN, CAD, Mobitz type 1, AAA                                                              HEIGHT:   65.00 inches                                        WEIGHT:   217.00 Lbs.        DYSPNEA:  On hills & stairs          COUGH:  Productive              WHEEZE:  No wheeze   TOBACCO PROD:   Cigarette   YEARS SMOKED:   45.0   PKS/DAY:   1.0   YEARS QUIT:    14.0                                                              MEDICATIONS:          POST-TEST COMMENTS:   Patient completed pulmonary function testing with DLCO.  Patient declined bronchodilator study.  Good patient effort and cooperation.  All testing meets ATS-ERS recommendations.  DLCO is an average of 2 maneuvers.  Predicted  DLCO is corrected for a Hgb of 14 drawn on 2018.       INTERPRETATION:      1.  Normal mechanics   2.  Flattened inspiratory limb of flow volume loop, rule out large airway obstruction   3.  Normal volumes   4.  Normal gas transfer         MARY ANN WEEMS MD             D: 2018   T: 2018   MT: LEESA      Name:     LIANG VAUGHN   MRN:      [id number]        Account:        [id number]   :      1940           Procedure Date: 2018      Document: [id number]

## 2018-07-23 ENCOUNTER — OFFICE VISIT (OUTPATIENT)
Dept: CARDIOLOGY | Facility: CLINIC | Age: 78
End: 2018-07-23
Attending: INTERNAL MEDICINE
Payer: MEDICARE

## 2018-07-23 VITALS
WEIGHT: 221 LBS | HEART RATE: 57 BPM | DIASTOLIC BLOOD PRESSURE: 78 MMHG | SYSTOLIC BLOOD PRESSURE: 132 MMHG | HEIGHT: 66 IN | OXYGEN SATURATION: 96 % | BODY MASS INDEX: 35.52 KG/M2

## 2018-07-23 DIAGNOSIS — R94.2 ABNORMAL LUNG FUNCTION TEST: Primary | ICD-10-CM

## 2018-07-23 DIAGNOSIS — R94.31 ABNORMAL ELECTROCARDIOGRAM: ICD-10-CM

## 2018-07-23 DIAGNOSIS — I49.3 PVC'S (PREMATURE VENTRICULAR CONTRACTIONS): ICD-10-CM

## 2018-07-23 PROCEDURE — 99214 OFFICE O/P EST MOD 30 MIN: CPT | Performed by: NURSE PRACTITIONER

## 2018-07-23 NOTE — MR AVS SNAPSHOT
After Visit Summary   7/23/2018    Fausto Farr    MRN: [id number]           Patient Information     Date Of Birth          1940        Visit Information        Provider Department      7/23/2018 8:00 AM Marta Casper APRN CNP [facility]        Today's Diagnoses     Abnormal lung function test    -  1    Abnormal electrocardiogram        PVC's (premature ventricular contractions)           Follow-ups after your visit        Additional Services     PULMONARY MEDICINE REFERRAL - [place] [facility]       Your provider has referred you to: [facility]: [facility], [phone]            Follow-Up with Cardiac Advanced Practice Provider                 Your next 10 appointments already scheduled     Jul 30, 2018  8:30 AM CDT   Anticoagulation Visit with  [facility] CLINIC   [facility]    [address]  [address]  [facility] [address] [phone]            Oct 19, 2018  9:00 AM CDT   Return Sleep Patient with Isrrael Dobbins MD   [facility] ([facility])    [address]  [facility] [id number] [phone]              Future tests that were ordered for you today     Open Future Orders        Priority Expected Expires Ordered    Follow-Up with Cardiac Advanced Practice Provider Routine 8/22/2018 7/23/2019 7/23/2018    Holter Monitor 24 hour - Adult Routine 7/30/2018 7/23/2019 7/23/2018    PULMONARY MEDICINE REFERRAL - [facility] Routine 7/30/2018 7/23/2019 7/23/2018            Who to contact     If you have questions or need follow up information about today's clinic visit or your schedule please contact [facility] directly at [phone].  Normal or non-critical lab and imaging results will be communicated to you by MyChart, letter or phone within 4 business days after the clinic has  "received the results. If you do not hear from us within 7 days, please contact the clinic through [employer]Mov[employer] or phone. If you have a critical or abnormal lab result, we will notify you by phone as soon as possible.  Submit refill requests through Editorially or call your pharmacy and they will forward the refill request to us. Please allow 3 business days for your refill to be completed.          Additional Information About Your Visit        Care EveryWhere ID     This is your Care EveryWhere ID. This could be used by other organizations to access your [place] medical records  [id number]        Your Vitals Were     Pulse Height Pulse Oximetry BMI (Body Mass Index)          57 1.664 m (5' 5.5\") 96% 36.22 kg/m2         Blood Pressure from Last 3 Encounters:   07/23/18 132/78   07/12/18 122/70   05/31/18 124/72    Weight from Last 3 Encounters:   07/23/18 100.2 kg (221 lb)   07/12/18 98.4 kg (217 lb)   05/31/18 97.5 kg (215 lb)              We Performed the Following     Follow-Up with Cardiac Advanced Practice Provider        Primary Care Provider Office Phone # Fax #    Tu Reyes [phone] [address] [place] [address]        Equal Access to Services     LOU MARTINS : Hadii aad ku hadasho Soomaali, waaxda luqadaha, qaybta kaalmada adeegyada, waxay silvinoin sandeep goldberg. So [facility] [phone].    ATENCIÓN: Si habla español, tiene a brown disposición servicios gratuitos de asistencia lingüística. Llame al [phone].    We comply with applicable federal civil rights laws and [place] laws. We do not discriminate on the basis of race, color, national origin, age, disability, sex, sexual orientation, or gender identity.            Thank you!     Thank you for choosing [facility]  for your care. Our goal is always to provide you with excellent care. Hearing back from our patients is one way we can continue to improve " our services. Please take a few minutes to complete the written survey that you may receive in the mail after your visit with us. Thank you!             Your Updated Medication List - Protect others around you: Learn how to safely use, store and throw away your medicines at www.disposemymeds.org.          This list is accurate as of 7/23/18 11:12 AM.  Always use your most recent med list.                   Brand Name Dispense Instructions for use Diagnosis    aspirin 81 MG EC tablet     1 tablet    Take 1 tablet (81 mg) by mouth daily        betamethasone valerate 0.1 % lotion    VALISONE    60 mL    APPLY TOPICALLY TWICE DAILY PRN    Eczema, unspecified type       ezetimibe 10 MG tablet    ZETIA    90 tablet    Take 1 tablet (10 mg) by mouth daily    Mixed hyperlipidemia       fluocinonide 0.05 % ointment    LIDEX    60 g    2- 30 gram tubes.  Apply twice a day as needed.    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)       metoprolol tartrate 25 MG tablet    LOPRESSOR    180 tablet    Take 1 tablet (25 mg) by mouth 2 times daily    Coronary artery disease involving coronary bypass graft 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       Potassium Chloride ER 20 MEQ Tbcr     30 tablet    Take 1 tablet (20 mEq) by mouth daily    Vitamin D deficiency, Left leg cellulitis, Essential hypertension, benign, Atrial fibrillation, unspecified type (H)       rosuvastatin 40 MG tablet    CRESTOR    90 tablet    Take 1 tablet (40 mg) by mouth daily    Hyperlipidemia, unspecified hyperlipidemia type       tamsulosin 0.4 MG capsule    FLOMAX    90 capsule    Take 1 capsule (0.4 mg) by mouth daily    Urinary retention       warfarin 5 MG tablet     COUMADIN    135 tablet    Take 1 1/2 tablets (7.5 mg) by mouth TWTFSS; 1 tablet (5 mg) on Mondays OR as directed by INR Clinic    Long-term (current) use of anticoagulants, S/P aortic valve replacement

## 2018-07-23 NOTE — LETTER
7/23/2018      Tu Reyes MD, MD  [address] [place] [address]      RE: Fausto Farr       Dear Colleague,    I had the pleasure of seeing Fausto Farr in the [facility].    Service Date: 07/23/2018      HISTORY OF PRESENT ILLNESS:  This 78-year-old male presents to [facility] today for a followup visit.  He is a patient of Dr. Santo seen in our clinic for valvular disease, coronary artery disease, right-sided heart failure, peripheral artery disease, metabolic syndrome and hyperlipidemia.      Ralph underwent 2-vessel bypass grafting and aortic valve replacement in 2006.  He received a LIMA to his LAD and a left radial graft to his right coronary artery.  Over the years, he developed a perivalvular leak and also was found to have significant mitral stenosis.  In 2013, he underwent redo mechanical aortic valve replacement and mechanical mitral valve replacement.  He has remained on chronic warfarin.  Followup echocardiograms have shown normal valvular gradients.      Ralph also underwent abdominal aortic aneurysm repair in 2008 and is followed closely by the vascular surgeon.  A recent CT scan shows stability.  More recently, he was seen in the hospital for sepsis from cellulitis.  Transesophageal echocardiogram showed no evidence of valvular vegetations.  He was noted to have normal functioning valves and a left ventricular ejection fraction of 55%.  He does have mild right ventricular dysfunction and chronic diastolic heart failure.  He remains on low-dose diuretic.  Ralph also suffers from chronic back pain and in the past has undergone lumbar fusion.  Recent evidence of multilevel decompression in his lumbar region.      Ralph saw Dr. Santo earlier this month and did complain of dyspnea on exertion and leg weakness.  He was asked to undergo a walking stress test to check for chronotropic incompetence. His  baseline EKG showed sinus rhythm with first-degree AV block, right bundle branch block, left anterior of heart block.  He was also under asked to undergo pulmonary function test as he has a history of remote smoking.  He returns today for reassessment and review of this test result.      Ralph in general tells me is doing well.  He has no chest pain with activity or at rest.  He does get winded when he is walking a fair amount of distance, especially with going up hills.  He also has some weakness in both legs and intermittent pain in his hips if he walks far.  He has not had any palpitations, lightheadedness, dizziness.  He denies orthopnea or peripheral edema.  He continues to take his Lasix regularly.  His weights have been fairly stable at home.  He is now using a CPAP on a regular basis.      I reviewed his stress test.  This again shows sinus rhythm with first-degree AV block, right bundle branch block, left anterior hemiblock.  The test was stopped due to hip pain.  He only reached 74% of the predicted heart rate.  There was no significant evidence of advanced heart block or usual symptoms that are associated with chronotropic incompetence.      I reviewed his pulmonary function test.  This showed normal mechanics, normal volumes, normal gas transfer.  He did have flattened inspiratory limb of flow volume loop suggestive of large airway obstruction.      PHYSICAL EXAMINATION:  His blood pressure today 132/78.  Heart rate of 57 beats per minute.  He does have frequent premature beats.  His lungs are clear.  There is no significant peripheral edema.      IMPRESSION AND PLAN:   1.  Chronic dyspnea on exertion.  This has been ongoing for 1-2 years.  He also has some leg weakness with walking.  Recent stress test done to check for chronotropic competence showed no significant advanced heart block or usual symptoms associated with chronotropic incompetence.  He did not reach predicted heart rate due to hip pain.   He does have frequent premature beats and I have asked him to undergo a 24-hour Holter monitor to assess PVC burden.  His pulmonary function test showed concern for possible large airway obstruction.  I have sent pulmonary referral for a pulmonologist.  He does have chronic diastolic heart failure and mild RV dysfunction.  However, there are no significant signs and symptoms of heart failure.  We will continue current medical regimen for now.  He is on low dose beta blockade and Lasix 20 mg.   2.  mechanical aortic valve replacement and mechanical mitral valve replacement  ().  Recent echocardiogram showed normal functioning valve with left ventricular ejection fraction of 55%.  He will continue with chronic warfarin.   3.  Treated sleep apnea.   4.  Metabolic syndrome and Mixed Hyperlipidemia.  He will continue with Crestor and Zetia.   5.  Peripheral artery disease.  Abdominal aortic aneurysm repair in .  He is followed closely by the vascular surgeons.   6.  Chronic back pain He has undergone a lumbar fusion in the past, but continues to have limiting activity due to hip/low back pain.     Thanks for allowing me to participate in this patient's care.  We will have him return in 1 month as planned.         ADELE NICHOLS, CNP             D: 2018   T: 2018   MT: RUSTY      Name:     LIANG VAUGHN   MRN:      [id number]        Account:      [id number]   :      1940           Service Date: 2018      Document: [id number]           Outpatient Encounter Prescriptions as of 2018   Medication Sig Dispense Refill     aspirin 81 MG EC tablet Take 1 tablet (81 mg) by mouth daily 1 tablet 0     betamethasone valerate (VALISONE) 0.1 % lotion APPLY TOPICALLY TWICE DAILY PRN 60 mL 3     ezetimibe (ZETIA) 10 MG tablet Take 1 tablet (10 mg) by mouth daily 90 tablet 3     fluocinonide (LIDEX) 0.05 % ointment 2- 30 gram tubes.  Apply twice a day as needed. 60 g 2     furosemide (LASIX) 40  MG tablet Take 0.5 tablets (20 mg) by mouth daily 45 tablet 3     mesalamine (ASACOL HD) 800 MG EC tablet Take 1 tablet (800 mg) by mouth 2 times daily 180 tablet 3     metoprolol tartrate (LOPRESSOR) 25 MG tablet Take 1 tablet (25 mg) by mouth 2 times daily 180 tablet 3     Potassium Chloride ER 20 MEQ TBCR Take 1 tablet (20 mEq) by mouth daily 30 tablet 1     rosuvastatin (CRESTOR) 40 MG tablet Take 1 tablet (40 mg) by mouth daily 90 tablet 3     tamsulosin (FLOMAX) 0.4 MG capsule Take 1 capsule (0.4 mg) by mouth daily 90 capsule 3     warfarin (COUMADIN) 5 MG tablet Take 1 1/2 tablets (7.5 mg) by mouth TWTFSS; 1 tablet (5 mg) on Mondays OR as directed by INR Clinic 135 tablet 3     [DISCONTINUED] ACE/ARB/ARNI NOT PRESCRIBED, INTENTIONAL, Please choose reason not prescribed, below       [DISCONTINUED] ASPIRIN NOT PRESCRIBED (INTENTIONAL) Please choose reason not prescribed, below 0 each 0     No facility-administered encounter medications on file as of 7/23/2018.        Again, thank you for allowing me to participate in the care of your patient.      Sincerely,    ADELE Solis [facility]

## 2018-07-23 NOTE — LETTER
7/23/2018    Tu Reyes MD, MD  [address] [place] [address]    RE: Fausto Farr       Dear Colleague,    I had the pleasure of seeing Fausto Farr in the [facility].    HPI and Plan: #[id number]  See dictation    Orders Placed This Encounter   Procedures     PULMONARY MEDICINE REFERRAL - [facility]     Follow-Up with Cardiac Advanced Practice Provider     Holter Monitor 24 hour - Adult       No orders of the defined types were placed in this encounter.      Medications Discontinued During This Encounter   Medication Reason     ACE/ARB/ARNI NOT PRESCRIBED, INTENTIONAL, Therapy completed     ASPIRIN NOT PRESCRIBED (INTENTIONAL) Therapy completed         Encounter Diagnoses   Name Primary?     Abnormal electrocardiogram      Abnormal lung function test Yes     PVC's (premature ventricular contractions)        CURRENT MEDICATIONS:  Current Outpatient Prescriptions   Medication Sig Dispense Refill     aspirin 81 MG EC tablet Take 1 tablet (81 mg) by mouth daily 1 tablet 0     betamethasone valerate (VALISONE) 0.1 % lotion APPLY TOPICALLY TWICE DAILY PRN 60 mL 3     ezetimibe (ZETIA) 10 MG tablet Take 1 tablet (10 mg) by mouth daily 90 tablet 3     fluocinonide (LIDEX) 0.05 % ointment 2- 30 gram tubes.  Apply twice a day as needed. 60 g 2     furosemide (LASIX) 40 MG tablet Take 0.5 tablets (20 mg) by mouth daily 45 tablet 3     mesalamine (ASACOL HD) 800 MG EC tablet Take 1 tablet (800 mg) by mouth 2 times daily 180 tablet 3     metoprolol tartrate (LOPRESSOR) 25 MG tablet Take 1 tablet (25 mg) by mouth 2 times daily 180 tablet 3     Potassium Chloride ER 20 MEQ TBCR Take 1 tablet (20 mEq) by mouth daily 30 tablet 1     rosuvastatin (CRESTOR) 40 MG tablet Take 1 tablet (40 mg) by mouth daily 90 tablet 3     tamsulosin (FLOMAX) 0.4 MG capsule Take 1 capsule (0.4 mg) by mouth daily 90 capsule 3     warfarin (COUMADIN) 5 MG tablet Take 1 1/2 tablets (7.5 mg)  by mouth TWTFSS; 1 tablet (5 mg) on Mondays OR as directed by INR Clinic 135 tablet 3       ALLERGIES     Allergies   Allergen Reactions     Bees Anaphylaxis       PAST MEDICAL HISTORY:  Past Medical History:   Diagnosis Date     Abdominal pain      Abnormal ECG     RBBB, 1st degree AVB, Left axis deviation     Anemia     currently taking iron     Arrhythmia     pac, pvc     Back pain     since 1980     Bruit      CAD (coronary artery disease)      Cellulitis 10/18/12     Cellulitis 05/2018    GrpB strep LLE cellulitis  negative RACHAEL for veg     Contact dermatitis and other eczema, due to unspecified cause      Diaphragmatic hernia without mention of obstruction or gangrene      Gastric ulcer      Glucose intolerance (impaired glucose tolerance)      Heart murmur 9/16/13    valvular heart disease     Hyperlipidaemia      Hypertension 8/6/13     Lumbago      Malaise and fatigue      Metabolic syndrome      Mobitz (type) I (Wenckebach's) atrioventricular block     and RBBB     Nocturia 10/18/12     Nocturia      Nonallopathic lesion of cervical region      Nonallopathic lesion of lumbar region      Nonallopathic lesion of pelvic region, not elsewhere classified      Nonallopathic lesion of rib cage      Nonallopathic lesion of sacral region      Paroxysmal atrial fibrillation (H) 10/18/12     Prostate cancer (H) 2008    radiation seed, XRT      PVD (peripheral vascular disease) (H)      Rotator cuff strain     and sprain     S/P aortic valve replacement 2006    developed perivalve leak and MS, therefore redo surg 2013     S/P CABG (coronary artery bypass graft) 2006    Lima-Lad, RA-Rca     Sciatica of left side     since 2000     Sepsis due to group B Streptococcus (H) 5/19/2018     Sleep apnea     pt declined cpap     Ulcerative colitis (H)      Vitamin D deficiency        PAST SURGICAL HISTORY:  Past Surgical History:   Procedure Laterality Date     AORTIC VALVE REPLACEMENT  1/3/06    redo AVR SJLISA 21mm and OG MVR  27mm in 2013S 21(AGFN [id number]):AVR, [facility] 27 :MVR-     ARTHROPLASTY KNEE      right knee     BACK SURGERY  Oct 2015    Fusion L4-5, laminectomy L2, L3     BYPASS GRAFT ARTERY CORONARY  10/2013    reimplantation of radial artery graft to RCA     C CABG, VEIN, TWO  1/3/06    Left radial to RCA, LIMA to LAD (RA to RCA reimplanted at time of 2013 surg)     CARDIAC CATHERIZATION  11/2005    Stent placed to RCA     CARDIAC CATHERIZATION  04/2013    Occluded RCA, patent LIMA to LAD and radial graft to PDA     CARPAL TUNNEL RELEASE RT/LT  1994     COLONOSCOPY  8-22-11     CYSTOSCOPY FLEXIBLE  10/16/2013    Procedure: CYSTOSCOPY FLEXIBLE;  FLEXIBLE CYSTOSCOPY / DILATION OF URETHRA / INSERTION OF LESLIE;  Surgeon: Cooper Wallace MD;  Location:  OR     ENDOVASCULAR REPAIR ANEURYSM ABDOMINAL AORTA  2006     ENDOVASCULAR REPAIR, INFRARENAL ABDOMINAL AORTIC ANEURYSM/DISSECTION; MODULAR BIFURCATED PROSTHESIS      AAA repair endovascular     ENT SURGERY       GENITOURINARY SURGERY  6/16/08    radioactive seeding     HEAD & NECK SURGERY  1997    vocal cord polypectomy     KNEE SURGERY  2001 Right knee arthroscopy     OPTICAL TRACKING SYSTEM FUSION SPINE POSTERIOR LUMBAR THREE+ LEVELS N/A 10/29/2015    Procedure: OPTICAL TRACKING SYSTEM FUSION SPINE POSTERIOR LUMBAR THREE+ LEVELS;  Surgeon: Walt Garcia MD;  Location:  OR     PROSTATE SURGERY  06/16/2008 Radioactive seeding     PROSTATE SURGERY      radioactive seeding 6/16/08     REPAIR ANEURYSM ABDOMINAL AORTA  06/08     REPAIR VALVE MITRAL  10/16/2013    [facility] 21(AGFN 756):AVR, [facility] 27 :MVRProcedure: REPAIR VALVE MITRAL;  REDO STERNOTOMY/REDO AORTIC VALVE REPLACEMENT/ MITRAL VALVE REPLACEMENT/REIMPLANTATION OF RIGHT CORONARY ARTERY BYPASS WITH RACHAEL ( ON PUMP);  Surgeon: Viet Singh MD;  Location:  OR     REPLACE VALVE AORTIC  10/16/2013    Procedure: REPLACE VALVE AORTIC;;  Surgeon: Viet Singh MD;  Location:  OR     SURGERY GENERAL IP  "CONSULT  05/2008 Excision aneurysm abdominal aorta     SURGERY GENERAL IP CONSULT  1997 Vocal cord polypectomy     VASCULAR SURGERY  1968, 1993     varicose vein stripping       FAMILY HISTORY:  Family History   Problem Relation Age of Onset     Coronary Artery Disease Father      CABG     HEART DISEASE Father      Pacemaker     Other Cancer Daughter      HEART DISEASE Brother      Other - See Comments Grandchild        SOCIAL HISTORY:  Social History     Social History     Marital status:      Spouse name: N/A     Number of children: N/A     Years of education: N/A     Social History Main Topics     Smoking status: Former Smoker     Packs/day: 1.00     Years: 40.00     Quit date: 10/23/2002     Smokeless tobacco: Never Used     Alcohol use Yes      Comment: a couple beers per week     Drug use: No     Sexual activity: No     Other Topics Concern     Parent/Sibling W/ Cabg, Mi Or Angioplasty Before 65f 55m? Yes     Brother had bypass at 55     Caffeine Concern No     6-8 cups of half and half per day     Special Diet No     Exercise No     Social History Narrative       Review of Systems:  Skin:  Negative       Eyes:  Positive for glasses    ENT:  Positive for hearing loss hearing aids  Respiratory:  Positive for dyspnea on exertion stairs   Cardiovascular:  Negative      Gastroenterology: Negative      Genitourinary:  not assessed      Musculoskeletal:  Positive for back pain;arthritis;joint pain    Neurologic:  Negative      Psychiatric:  Negative      Heme/Lymph/Imm:  Negative      Endocrine:  Negative        Physical Exam:  Vitals: /78 (BP Location: Right arm, Patient Position: Sitting, Cuff Size: Adult Regular)  Pulse 57  Ht 1.664 m (5' 5.5\")  Wt 100.2 kg (221 lb)  SpO2 96%  BMI 36.22 kg/m2    Constitutional:  cooperative;in no acute distress   in pain from back issue    Skin:  warm and dry to the touch          Head:  normocephalic        Eyes:  pupils equal and round        Lymph:      ENT:  " no pallor or cyanosis        Neck:  JVP normal   minimal bruit (carotid john <50% bilat)    Respiratory:  clear to auscultation;normal respiratory excursion    well healed sternotomy    Cardiac: regular rhythm frequent premature beats   crisp prosthetic valve sounds early systolic murmur;grade 1        pulses full and equal                                   difficult to feel bilat fem and DP pulses both probably 1+ and no bruits    GI:  abdomen soft obese      Extremities and Muscular Skeletal:      trace;bilateral LE edema;L greater than R     marked varicose veins L>R, prior vein strip    Neurological:  affect appropriate        Psych:  Alert and Oriented x 3          CC  Calderon Santo MD  [address]  [place] [address]                Attempted to call pt back to discuss need for holter monitor and pulmonary evaluation  Left message to return call     Service Date: 07/23/2018      HISTORY OF PRESENT ILLNESS:  This 78-year-old male presents to [facility] today for a followup visit.  He is a patient of Dr. Santo seen in our clinic for valvular disease, coronary artery disease, right-sided heart failure, peripheral artery disease, metabolic syndrome and hyperlipidemia.      Ralph underwent 2-vessel bypass grafting and aortic valve replacement in 2006.  He received a LIMA to his LAD and a left radial graft to his right coronary artery.  Over the years, he developed a perivalvular leak and also was found to have significant mitral stenosis.  In 2013, he underwent redo mechanical aortic valve replacement and mechanical mitral valve replacement.  He has remained on chronic warfarin.  Followup echocardiograms have shown normal valvular gradients.      Ralph also underwent abdominal aortic aneurysm repair in 2008 and is followed closely by the vascular surgeon.  A recent CT scan shows stability.  More recently, he was seen in the hospital for sepsis from cellulitis.   Transesophageal echocardiogram showed no evidence of valvular vegetations.  He was noted to have normal functioning valves and a left ventricular ejection fraction of 55%.  He does have mild right ventricular dysfunction and chronic diastolic heart failure.  He remains on low-dose diuretic.  Ralph also suffers from chronic back pain and in the past has undergone lumbar fusion.  Recent evidence of multilevel decompression in his lumbar region.      Ralph saw Dr. Santo earlier this month and did complain of dyspnea on exertion and leg weakness.  He was asked to undergo a walking stress test to check for current chronotropic incompetence as his baseline EKG showed sinus rhythm with first-degree AV block, right bundle branch block, left anterior of heart block.  He was also under asked to undergo pulmonary function test as he has a history of remote smoking.  He returns today for reassessment and review of this test result.      Ralph in general tells me is doing well.  He has no chest pain with activity or at rest.  He does get winded when he is walking a fair amount of distance, especially with going up hills.  He also has some weakness in both legs and intermittent pain in his hips if he walks far.  He has not had any palpitations, lightheadedness, dizziness.  He denies orthopnea or peripheral edema.  He continues to take his Lasix regularly.  His weights have been fairly stable at home.  He is now using a CPAP on a regular basis.      I reviewed his stress test.  This again shows sinus rhythm with first-degree AV block, right bundle branch block, left anterior hemiblock.  The test was stopped due to hip pain.  He only reached 74% of the predicted heart rate.  There is no significant evidence of advanced heart block or usual symptoms with chronotropic incompetence.      I reviewed his pulmonary function test.  This showed normal mechanics, normal volumes, normal gas transfer.  He did have flattened inspiratory limb  of flow volume loop suggestive of large airway obstruction.      PHYSICAL EXAMINATION:  His blood pressure today 132/78.  Heart rate of 57 beats per minute.  He does have frequent premature beats.  His lungs are clear.  There is no significant peripheral edema.      IMPRESSION AND PLAN:   1.  Chronic dyspnea on exertion.  This has been ongoing for 1-2 years.  He also has some leg weakness with walking.  Recent stress test done to check for chronotropic competence showed no significant advanced heart block or usual symptoms associated with chronotropic incompetence.  He did not reach predicted heart rate due to hip pain.  He does have frequent premature beats and I have asked him to undergo a 24-hour Holter monitor to assess PVC burden.  His pulmonary function test showed concern for possible large airway obstruction.  I have sent pulmonary referral for a pulmonologist.  He does have chronic diastolic heart failure and mild RV dysfunction.  However, there are no significant signs and symptoms of heart failure.  We will continue current medical regimen for now.  He is on low dose beta blockade and Lasix 20 mg.   2.  History of mechanical aortic valve replacement and mechanical mitral valve replacement in 2013.  Recent echocardiogram showed normal functioning valve with left ventricular ejection fraction of 55%.  He will continue with chronic warfarin.   3.  Treated sleep apnea.   4.  Metabolic syndrome and hyperlipidemia.  He will continue with Crestor and Zetia.   5.  Peripheral artery disease.  Abdominal aortic aneurysm repair in 2008.  He is followed closely by the vascular surgeons.   6.  Chronic back pain with a history of lumbar fusion.      Thanks for allowing me to participate in this patient's care.  We will have him return in 1 month as planned.         ADELE NICHOLS, CNP             D: 07/23/2018   T: 07/23/2018   MT: RUSTY      Name:     LIANG VAUGHN   MRN:      [id number]        Account:       [id number]   :      1940           Service Date: 2018      Document: [id number]         Thank you for allowing me to participate in the care of your patient.      Sincerely,     ADELE Solis [facility]    cc:   Calderon Santo MD  [address]  [place] [id number]

## 2018-07-23 NOTE — PROGRESS NOTES
HPI and Plan: #[id number]  See dictation    Orders Placed This Encounter   Procedures     PULMONARY MEDICINE REFERRAL - [facility]     Follow-Up with Cardiac Advanced Practice Provider     Holter Monitor 24 hour - Adult       No orders of the defined types were placed in this encounter.      Medications Discontinued During This Encounter   Medication Reason     ACE/ARB/ARNI NOT PRESCRIBED, INTENTIONAL, Therapy completed     ASPIRIN NOT PRESCRIBED (INTENTIONAL) Therapy completed         Encounter Diagnoses   Name Primary?     Abnormal electrocardiogram      Abnormal lung function test Yes     PVC's (premature ventricular contractions)        CURRENT MEDICATIONS:  Current Outpatient Prescriptions   Medication Sig Dispense Refill     aspirin 81 MG EC tablet Take 1 tablet (81 mg) by mouth daily 1 tablet 0     betamethasone valerate (VALISONE) 0.1 % lotion APPLY TOPICALLY TWICE DAILY PRN 60 mL 3     ezetimibe (ZETIA) 10 MG tablet Take 1 tablet (10 mg) by mouth daily 90 tablet 3     fluocinonide (LIDEX) 0.05 % ointment 2- 30 gram tubes.  Apply twice a day as needed. 60 g 2     furosemide (LASIX) 40 MG tablet Take 0.5 tablets (20 mg) by mouth daily 45 tablet 3     mesalamine (ASACOL HD) 800 MG EC tablet Take 1 tablet (800 mg) by mouth 2 times daily 180 tablet 3     metoprolol tartrate (LOPRESSOR) 25 MG tablet Take 1 tablet (25 mg) by mouth 2 times daily 180 tablet 3     Potassium Chloride ER 20 MEQ TBCR Take 1 tablet (20 mEq) by mouth daily 30 tablet 1     rosuvastatin (CRESTOR) 40 MG tablet Take 1 tablet (40 mg) by mouth daily 90 tablet 3     tamsulosin (FLOMAX) 0.4 MG capsule Take 1 capsule (0.4 mg) by mouth daily 90 capsule 3     warfarin (COUMADIN) 5 MG tablet Take 1 1/2 tablets (7.5 mg) by mouth TWTFSS; 1 tablet (5 mg) on Mondays OR as directed by INR Clinic 135 tablet 3       ALLERGIES     Allergies   Allergen Reactions     Bees Anaphylaxis       PAST MEDICAL HISTORY:  Past Medical History:   Diagnosis Date      Abdominal pain      Abnormal ECG     RBBB, 1st degree AVB, Left axis deviation     Anemia     currently taking iron     Arrhythmia     pac, pvc     Back pain     since 1980     Bruit      CAD (coronary artery disease)      Cellulitis 10/18/12     Cellulitis 05/2018    GrpB strep LLE cellulitis  negative RACHAEL for veg     Contact dermatitis and other eczema, due to unspecified cause      Diaphragmatic hernia without mention of obstruction or gangrene      Gastric ulcer      Glucose intolerance (impaired glucose tolerance)      Heart murmur 9/16/13    valvular heart disease     Hyperlipidaemia      Hypertension 8/6/13     Lumbago      Malaise and fatigue      Metabolic syndrome      Mobitz (type) I (Wenckebach's) atrioventricular block     and RBBB     Nocturia 10/18/12     Nocturia      Nonallopathic lesion of cervical region      Nonallopathic lesion of lumbar region      Nonallopathic lesion of pelvic region, not elsewhere classified      Nonallopathic lesion of rib cage      Nonallopathic lesion of sacral region      Paroxysmal atrial fibrillation (H) 10/18/12     Prostate cancer (H) 2008    radiation seed, XRT      PVD (peripheral vascular disease) (H)      Rotator cuff strain     and sprain     S/P aortic valve replacement 2006    developed perivalve leak and MS, therefore redo surg 2013     S/P CABG (coronary artery bypass graft) 2006    Lima-Lad, RA-Rca     Sciatica of left side     since 2000     Sepsis due to group B Streptococcus (H) 5/19/2018     Sleep apnea     pt declined cpap     Ulcerative colitis (H)      Vitamin D deficiency        PAST SURGICAL HISTORY:  Past Surgical History:   Procedure Laterality Date     AORTIC VALVE REPLACEMENT  1/3/06    redo AVR SJM 21mm and SJM MVR 27mm in 2013SJM 21([id number]):AVR, SJM 27 :MVR-     ARTHROPLASTY KNEE      right knee     BACK SURGERY  Oct 2015    Fusion L4-5, laminectomy L2, L3     BYPASS GRAFT ARTERY CORONARY  10/2013    reimplantation of radial artery  graft to RCA     C CABG, VEIN, TWO  1/3/06    Left radial to RCA, LIMA to LAD (RA to RCA reimplanted at time of 2013 surg)     CARDIAC CATHERIZATION  11/2005    Stent placed to RCA     CARDIAC CATHERIZATION  04/2013    Occluded RCA, patent LIMA to LAD and radial graft to PDA     CARPAL TUNNEL RELEASE RT/LT  1994     COLONOSCOPY  8-22-11     CYSTOSCOPY FLEXIBLE  10/16/2013    Procedure: CYSTOSCOPY FLEXIBLE;  FLEXIBLE CYSTOSCOPY / DILATION OF URETHRA / INSERTION OF LESLIE;  Surgeon: Cooper Wallace MD;  Location: [facility] OR     ENDOVASCULAR REPAIR ANEURYSM ABDOMINAL AORTA  2006     ENDOVASCULAR REPAIR, INFRARENAL ABDOMINAL AORTIC ANEURYSM/DISSECTION; MODULAR BIFURCATED PROSTHESIS      AAA repair endovascular     ENT SURGERY       GENITOURINARY SURGERY  6/16/08    radioactive seeding     HEAD & NECK SURGERY  1997    vocal cord polypectomy     KNEE SURGERY  2001 Right knee arthroscopy     OPTICAL TRACKING SYSTEM FUSION SPINE POSTERIOR LUMBAR THREE+ LEVELS N/A 10/29/2015    Procedure: OPTICAL TRACKING SYSTEM FUSION SPINE POSTERIOR LUMBAR THREE+ LEVELS;  Surgeon: Walt Garcia MD;  Location: [facility] OR     PROSTATE SURGERY  06/16/2008 Radioactive seeding     PROSTATE SURGERY      radioactive seeding 6/16/08     REPAIR ANEURYSM ABDOMINAL AORTA  06/08     REPAIR VALVE MITRAL  10/16/2013    SJM 21([id number] [id number]):AVR, SJM 27 :MVRProcedure: REPAIR VALVE MITRAL;  REDO STERNOTOMY/REDO AORTIC VALVE REPLACEMENT/ MITRAL VALVE REPLACEMENT/REIMPLANTATION OF RIGHT CORONARY ARTERY BYPASS WITH RACHAEL ( ON PUMP);  Surgeon: Viet Singh MD;  Location:  OR     REPLACE VALVE AORTIC  10/16/2013    Procedure: REPLACE VALVE AORTIC;;  Surgeon: Viet Singh MD;  Location:  OR     SURGERY GENERAL IP CONSULT  05/2008 Excision aneurysm abdominal aorta     SURGERY GENERAL IP CONSULT  1997 Vocal cord polypectomy     VASCULAR SURGERY  1968, 1993     varicose vein stripping       FAMILY HISTORY:  Family History   Problem Relation Age  "of Onset     Coronary Artery Disease Father      CABG     HEART DISEASE Father      Pacemaker     Other Cancer Daughter      HEART DISEASE Brother      Other - See Comments Grandchild        SOCIAL HISTORY:  Social History     Social History     Marital status:      Spouse name: N/A     Number of children: N/A     Years of education: N/A     Social History Main Topics     Smoking status: Former Smoker     Packs/day: 1.00     Years: 40.00     Quit date: 10/23/2002     Smokeless tobacco: Never Used     Alcohol use Yes      Comment: a couple beers per week     Drug use: No     Sexual activity: No     Other Topics Concern     Parent/Sibling W/ Cabg, Mi Or Angioplasty Before 65f 55m? Yes     Brother had bypass at 55     Caffeine Concern No     6-8 cups of half and half per day     Special Diet No     Exercise No     Social History Narrative       Review of Systems:  Skin:  Negative       Eyes:  Positive for glasses    ENT:  Positive for hearing loss hearing aids  Respiratory:  Positive for dyspnea on exertion stairs   Cardiovascular:  Negative      Gastroenterology: Negative      Genitourinary:  not assessed      Musculoskeletal:  Positive for back pain;arthritis;joint pain    Neurologic:  Negative      Psychiatric:  Negative      Heme/Lymph/Imm:  Negative      Endocrine:  Negative        Physical Exam:  Vitals: /78 (BP Location: Right arm, Patient Position: Sitting, Cuff Size: Adult Regular)  Pulse 57  Ht 1.664 m (5' 5.5\")  Wt 100.2 kg (221 lb)  SpO2 96%  BMI 36.22 kg/m2    Constitutional:  cooperative;in no acute distress   in pain from back issue    Skin:  warm and dry to the touch          Head:  normocephalic        Eyes:  pupils equal and round        Lymph:      ENT:  no pallor or cyanosis        Neck:  JVP normal   minimal bruit (carotid john <50% bilat)    Respiratory:  clear to auscultation;normal respiratory excursion    well healed sternotomy    Cardiac: regular rhythm frequent premature " beats   crisp prosthetic valve sounds early systolic murmur;grade 1        pulses full and equal                                   difficult to feel bilat fem and DP pulses both probably 1+ and no bruits    GI:  abdomen soft obese      Extremities and Muscular Skeletal:      trace;bilateral LE edema;L greater than R     marked varicose veins L>R, prior vein strip    Neurological:  affect appropriate        Psych:  Alert and Oriented x 3          CC  Calderon Santo MD  [address]  [place] [address]                Attempted to call pt back to discuss need for holter monitor and pulmonary evaluation  Left message to return call

## 2018-07-23 NOTE — PROGRESS NOTES
Service Date: 07/23/2018      HISTORY OF PRESENT ILLNESS:  This 78-year-old male presents to [facility] today for a followup visit.  He is a patient of Dr. Santo seen in our clinic for valvular disease, coronary artery disease, right-sided heart failure, peripheral artery disease, metabolic syndrome and hyperlipidemia.      Ralph underwent 2-vessel bypass grafting and aortic valve replacement in 2006.  He received a LIMA to his LAD and a left radial graft to his right coronary artery.  Over the years, he developed a perivalvular leak and also was found to have significant mitral stenosis.  In 2013, he underwent redo mechanical aortic valve replacement and mechanical mitral valve replacement.  He has remained on chronic warfarin.  Followup echocardiograms have shown normal valvular gradients.      Ralph also underwent abdominal aortic aneurysm repair in 2008 and is followed closely by the vascular surgeon.  A recent CT scan shows stability.  More recently, he was seen in the hospital for sepsis from cellulitis.  Transesophageal echocardiogram showed no evidence of valvular vegetations.  He was noted to have normal functioning valves and a left ventricular ejection fraction of 55%.  He does have mild right ventricular dysfunction and chronic diastolic heart failure.  He remains on low-dose diuretic.  Ralph also suffers from chronic back pain and in the past has undergone lumbar fusion.  Recent evidence of multilevel decompression in his lumbar region.      Ralph saw Dr. Santo earlier this month and did complain of dyspnea on exertion and leg weakness.  He was asked to undergo a walking stress test to check for chronotropic incompetence. His baseline EKG showed sinus rhythm with first-degree AV block, right bundle branch block, left anterior of heart block.  He was also under asked to undergo pulmonary function test as he has a history of remote smoking.  He returns today for  reassessment and review of this test result.      Ralph in general tells me is doing well.  He has no chest pain with activity or at rest.  He does get winded when he is walking a fair amount of distance, especially with going up hills.  He also has some weakness in both legs and intermittent pain in his hips if he walks far.  He has not had any palpitations, lightheadedness, dizziness.  He denies orthopnea or peripheral edema.  He continues to take his Lasix regularly.  His weights have been fairly stable at home.  He is now using a CPAP on a regular basis.      I reviewed his stress test.  This again shows sinus rhythm with first-degree AV block, right bundle branch block, left anterior hemiblock.  The test was stopped due to hip pain.  He only reached 74% of the predicted heart rate.  There was no significant evidence of advanced heart block or usual symptoms that are associated with chronotropic incompetence.      I reviewed his pulmonary function test.  This showed normal mechanics, normal volumes, normal gas transfer.  He did have flattened inspiratory limb of flow volume loop suggestive of large airway obstruction.      PHYSICAL EXAMINATION:  His blood pressure today 132/78.  Heart rate of 57 beats per minute.  He does have frequent premature beats.  His lungs are clear.  There is no significant peripheral edema.      IMPRESSION AND PLAN:   1.  Chronic dyspnea on exertion.  This has been ongoing for 1-2 years.  He also has some leg weakness with walking.  Recent stress test done to check for chronotropic competence showed no significant advanced heart block or usual symptoms associated with chronotropic incompetence.  He did not reach predicted heart rate due to hip pain.  He does have frequent premature beats and I have asked him to undergo a 24-hour Holter monitor to assess PVC burden.  His pulmonary function test showed concern for possible large airway obstruction.  I have sent pulmonary referral for a  pulmonologist.  He does have chronic diastolic heart failure and mild RV dysfunction.  However, there are no significant signs and symptoms of heart failure.  We will continue current medical regimen for now.  He is on low dose beta blockade and Lasix 20 mg.   2.  mechanical aortic valve replacement and mechanical mitral valve replacement  ().  Recent echocardiogram showed normal functioning valve with left ventricular ejection fraction of 55%.  He will continue with chronic warfarin.   3.  Treated sleep apnea.   4.  Metabolic syndrome and Mixed Hyperlipidemia.  He will continue with Crestor and Zetia.   5.  Peripheral artery disease.  Abdominal aortic aneurysm repair in .  He is followed closely by the vascular surgeons.   6.  Chronic back pain He has undergone a lumbar fusion in the past, but continues to have limiting activity due to hip/low back pain.     Thanks for allowing me to participate in this patient's care.  We will have him return in 1 month as planned.         ADELE NICHOLS, CNP             D: 2018   T: 2018   MT: RUSTY      Name:     LIANG VAUGHN   MRN:      [id number]        Account:      [id number]   :      1940           Service Date: 2018      Document: [id number]

## 2018-07-24 ENCOUNTER — TELEPHONE (OUTPATIENT)
Dept: CARDIOLOGY | Facility: CLINIC | Age: 78
End: 2018-07-24

## 2018-07-24 ENCOUNTER — NURSE TRIAGE (OUTPATIENT)
Dept: NURSING | Facility: CLINIC | Age: 78
End: 2018-07-24

## 2018-07-24 NOTE — TELEPHONE ENCOUNTER
Patient was seen yesterday in clinic. Marta Casper NP spoke with Dr. Santo after patient's appt. Marta would like a Holter to assess patient's PVC's and recommended a pulmonary evaluation to assess patient's dyspnea on exertion.      Left patient a message to return my call.     Enedina ANDERSON

## 2018-07-24 NOTE — TELEPHONE ENCOUNTER
Patient returned my call. Reviewed recommendations. Patient had no questions. Scheduling will contact patient. Enedina ANDERSON

## 2018-07-24 NOTE — TELEPHONE ENCOUNTER
Patient asking if he needs to make an appointment to have lab work done; has MRSA in nose.  Order is current in chart; transferred to scheduling for appointment.

## 2018-07-25 DIAGNOSIS — A49.02 MRSA INFECTION: ICD-10-CM

## 2018-07-25 LAB
MRSA DNA SPEC QL NAA+PROBE: NEGATIVE
SPECIMEN SOURCE: NORMAL

## 2018-07-25 PROCEDURE — 87640 STAPH A DNA AMP PROBE: CPT | Mod: XU | Performed by: INTERNAL MEDICINE

## 2018-07-25 PROCEDURE — 87641 MR-STAPH DNA AMP PROBE: CPT | Performed by: INTERNAL MEDICINE

## 2018-07-25 NOTE — LETTER
"               [facility]  [address]  [place] [address]                  [phone]   July 27, 2018    Fausto Farr  [address] [facility] [address]      Dear Fausto,    Here is a summary of your recent test results:    The nasal swab was negative again. We typically need 3 of these to be negative before \"cleared\" by hospital standards. I have ordered more if you would like to repeat, but we don't have to as you are clear now.     Your test results are enclosed.      Please contact me if you have any questions.           Thank you very much for choosing [facility]    Best regards,    Tu Reyes MD        Results for orders placed or performed in visit on 07/25/18   Methicillin Resist/Sens S. aureus PCR   Result Value Ref Range    Specimen Description Nares     Methicillin Resist/Sens S. aureus PCR Negative NEG^Negative     "

## 2018-07-30 ENCOUNTER — TELEPHONE (OUTPATIENT)
Dept: FAMILY MEDICINE | Facility: CLINIC | Age: 78
End: 2018-07-30

## 2018-07-30 ENCOUNTER — ANTICOAGULATION THERAPY VISIT (OUTPATIENT)
Dept: NURSING | Facility: CLINIC | Age: 78
End: 2018-07-30
Payer: MEDICARE

## 2018-07-30 ENCOUNTER — HOSPITAL ENCOUNTER (OUTPATIENT)
Dept: CARDIOLOGY | Facility: CLINIC | Age: 78
Discharge: HOME | End: 2018-07-30
Attending: NURSE PRACTITIONER | Admitting: NURSE PRACTITIONER
Payer: MEDICARE

## 2018-07-30 DIAGNOSIS — I48.91 AF (ATRIAL FIBRILLATION) (H): ICD-10-CM

## 2018-07-30 DIAGNOSIS — Z79.01 LONG-TERM (CURRENT) USE OF ANTICOAGULANTS: ICD-10-CM

## 2018-07-30 DIAGNOSIS — Z95.2 S/P MITRAL VALVE REPLACEMENT: ICD-10-CM

## 2018-07-30 DIAGNOSIS — I49.3 PVC'S (PREMATURE VENTRICULAR CONTRACTIONS): ICD-10-CM

## 2018-07-30 LAB
INR POINT OF CARE: 5.7 (ref 0.86–1.14)
INR PPP: 5.9

## 2018-07-30 PROCEDURE — 36416 COLLJ CAPILLARY BLOOD SPEC: CPT

## 2018-07-30 PROCEDURE — 85610 PROTHROMBIN TIME: CPT | Mod: QW

## 2018-07-30 PROCEDURE — 93225 XTRNL ECG REC<48 HRS REC: CPT

## 2018-07-30 PROCEDURE — 99207 ZZC NO CHARGE NURSE ONLY: CPT

## 2018-07-30 NOTE — PROGRESS NOTES
ANTICOAGULATION FOLLOW-UP CLINIC VISIT    Patient Name:  Fausto Farr  Date:  7/30/2018  Contact Type:  Face to Face    SUBJECTIVE:     Patient Findings     Positives OTC meds, Inflammation    Comments He has been taking 3000 mg of Tylenol for hip pain.  Will try and reduce tylenol intake   and eat more green vegetables while he is taking it.    Patient denies: extra doses, illness, bleeding/bruises             OBJECTIVE    INR   Date Value Ref Range Status   07/30/2018 5.9  Final     INR Protime   Date Value Ref Range Status   07/30/2018 5.7 (A) 0.86 - 1.14 Final       ASSESSMENT / PLAN  INR assessment SUPRA    Recheck INR In: 3 WEEKS he did not want to come back for 4 weeks, compromised on 3 weeks   INR Location Clinic      Anticoagulation Summary as of 7/30/2018     INR goal 2.5-3.5   Today's INR 5.7!   Warfarin maintenance plan 5 mg (5 mg x 1) on Mon; 7.5 mg (5 mg x 1.5) all other days   Full warfarin instructions 7/30: Hold; 7/31: 5 mg; Otherwise 5 mg on Mon; 7.5 mg all other days   Weekly warfarin total 50 mg   Plan last modified Caryn Campos RN (11/9/2017)   Next INR check 8/20/2018   Priority INR   Target end date Indefinite    Indications   AF (atrial fibrillation) (H) [I48.91]  S/P mitral valve replacement [Z95.2]  Long-term (current) use of anticoagulants [Z79.01] [Z79.01]         Anticoagulation Episode Summary     INR check location     Preferred lab     Send INR reminders to [facility]    Comments 5mg tabs - andrade dose // transfer from [facility] // [facility] // CALENDAR      Anticoagulation Care Providers     Provider Role Specialty Phone number    Tu Reyes MD  Internal Medicine [phone]            See the Encounter Report to view Anticoagulation Flowsheet and Dosing Calendar (Go to Encounters tab in chart review, and find the Anticoagulation Therapy Visit)        Caryn Campos RN

## 2018-07-30 NOTE — MR AVS SNAPSHOT
Fausto Carson Yordan   7/30/2018 8:30 AM   Anticoagulation Therapy Visit    Description:  78 year old male   Provider:  [facility]   Department:  [facility]           INR as of 7/30/2018     Today's INR 5.7!      Anticoagulation Summary as of 7/30/2018     INR goal 2.5-3.5   Today's INR 5.7!   Full warfarin instructions 7/30: Hold; 7/31: 5 mg; Otherwise 5 mg on Mon; 7.5 mg all other days   Next INR check 8/20/2018    Indications   AF (atrial fibrillation) (H) [I48.91]  S/P mitral valve replacement [Z95.2]  Long-term (current) use of anticoagulants [Z79.01] [Z79.01]         Your next Anticoagulation Clinic appointment(s)     Aug 20, 2018  8:30 AM CDT   Anticoagulation Visit with  ANTICOAGULATION CLINIC   [facility] [place] ([facility])    [address]  [address]  [facility] [place] [place] [facility] [id number] [phone]              Contact Numbers     [facility]  Please call  [phone] to cancel and/or reschedule your appointment   Please call  [phone] with any problems or questions regarding your therapy.        July 2018 Details    Sun Mon Tue Wed Thu Fri Sat     1               2               3               4               5               6               7                 8               9               10               11               12               13               14                 15               16               17               18               19               20               21                 22               23               24               25               26               27               28                 29               30      Hold   See details      31      5 mg              Date Details   07/30 This INR check               How to take your warfarin dose     To take:  5 mg Take 1 of the 5 mg tablets.    Hold Do not take your warfarin dose. See the Details table to the right for additional instructions.                August 2018 Details    Sun Mon  Tue Wed Thu Fri Sat        1      7.5 mg         2      7.5 mg         3      7.5 mg         4      7.5 mg           5      7.5 mg         6      5 mg         7      7.5 mg         8      7.5 mg         9      7.5 mg         10      7.5 mg         11      7.5 mg           12      7.5 mg         13      5 mg         14      7.5 mg         15      7.5 mg         16      7.5 mg         17      7.5 mg         18      7.5 mg           19      7.5 mg         20            21               22               23               24               25                 26               27               28               29               30               31                 Date Details   No additional details    Date of next INR:  8/20/2018         How to take your warfarin dose     To take:  5 mg Take 1 of the 5 mg tablets.    To take:  7.5 mg Take 1.5 of the 5 mg tablets.

## 2018-07-30 NOTE — TELEPHONE ENCOUNTER
Patient is wondering how often the nasal swab needs to be done to be cleared for MRSA. Please advise and notify patient, he would like to schedule.  -Paige Godwin

## 2018-08-06 LAB — INTERPRETATION MONITOR -MUSE: NORMAL

## 2018-08-07 ENCOUNTER — TRANSFERRED RECORDS (OUTPATIENT)
Dept: HEALTH INFORMATION MANAGEMENT | Facility: CLINIC | Age: 78
End: 2018-08-07

## 2018-08-07 ENCOUNTER — TELEPHONE (OUTPATIENT)
Dept: CARDIOLOGY | Facility: CLINIC | Age: 78
End: 2018-08-07

## 2018-08-07 NOTE — TELEPHONE ENCOUNTER
Holter dated 07-30-18    Principle rhythm was sinus with RBBB and 1st degree AV block and frequent bouts of tachy/lashae. Average heart rate was 69 bpm. Minimum heart rate was 42 bpm at 05:29:04 on 31-July. Maximum heart rate was 101 bpm at 08:19:59 on 31-July. No pauses greater than 2.0 seconds. He had 240 isolated supraventricular ectopics. He had 10,176 isolated ventricular ectopics, 2,524 couplets, 1,109 bigeminal events, 113 trigeminal events, and 164 quadrigeminal events. He had 546 runs totaling 1,926 beats. The longest run was 15 beats long, 85 bpm, and at 18:34:50 on 30-July. The fastest run was 174 bpm at 06:14:53 on 31-July. He had one episode of shortness of breath occuring at 09:12:03 on 31-July.    --------------------------------------------------    Marta Casper NP is not available. Per report, findings were reviewed with Dr. Santo. Left a message with Dr. Santo's nurse, would like to know if there are recommendations. Patient is seeing Marta Casper NP on 08-27-18. nEedina ANDERSON

## 2018-08-08 DIAGNOSIS — I49.3 PVC'S (PREMATURE VENTRICULAR CONTRACTIONS): Primary | ICD-10-CM

## 2018-08-20 ENCOUNTER — ANTICOAGULATION THERAPY VISIT (OUTPATIENT)
Dept: NURSING | Facility: CLINIC | Age: 78
End: 2018-08-20
Payer: MEDICARE

## 2018-08-20 DIAGNOSIS — Z79.01 LONG-TERM (CURRENT) USE OF ANTICOAGULANTS: ICD-10-CM

## 2018-08-20 DIAGNOSIS — Z95.2 S/P MITRAL VALVE REPLACEMENT: ICD-10-CM

## 2018-08-20 DIAGNOSIS — I48.91 AF (ATRIAL FIBRILLATION) (H): ICD-10-CM

## 2018-08-20 LAB — INR POINT OF CARE: 3 (ref 0.86–1.14)

## 2018-08-20 PROCEDURE — 99207 ZZC NO CHARGE NURSE ONLY: CPT

## 2018-08-20 PROCEDURE — 36416 COLLJ CAPILLARY BLOOD SPEC: CPT

## 2018-08-20 PROCEDURE — 85610 PROTHROMBIN TIME: CPT | Mod: QW

## 2018-08-20 NOTE — PROGRESS NOTES
ANTICOAGULATION FOLLOW-UP CLINIC VISIT    Patient Name:  Fausto Farr  Date:  8/20/2018  Contact Type:  Face to Face  INR today is in range, no concerns from pt. Advised to continue maintenance dosing & recheck in 4 weeks.    Pt recently had an appointment with pulmonologist & had heart monitor placement. Has an upcoming appointment with cardiologist to go over the heart monitor result. Pt thinks that he might be suggested to get a pacemaker by cardiologist. Also his R hip pain has been better, but now pain radiates to his R knee. Planning to f/u with ortho. If he needs R hip or R knee surgery needed, he would like to combine both pacemaker placement & hip/knee procedure on the same day. He will keep us posted.     SUBJECTIVE:     Patient Findings     Positives No Problem Findings    Comments Denies bleeding/bruising or missed dose.             OBJECTIVE    INR Protime   Date Value Ref Range Status   08/20/2018 3.0 (A) 0.86 - 1.14 Final       ASSESSMENT / PLAN  No question data found.  Anticoagulation Summary as of 8/20/2018     INR goal 2.5-3.5   Today's INR 3.0   Warfarin maintenance plan 5 mg (5 mg x 1) on Mon; 7.5 mg (5 mg x 1.5) all other days   Full warfarin instructions 5 mg on Mon; 7.5 mg all other days   Weekly warfarin total 50 mg   Plan last modified Caryn Campos, RN (11/9/2017)   Next INR check 9/17/2018   Priority INR   Target end date Indefinite    Indications   AF (atrial fibrillation) (H) [I48.91]  S/P mitral valve replacement [Z95.2]  Long-term (current) use of anticoagulants [Z79.01] [Z79.01]         Anticoagulation Episode Summary     INR check location     Preferred lab     Send INR reminders to  [facility]    Comments 5mg tabs - andrade dose // transfer from [facility] // [facility] [place] // CALENDAR      Anticoagulation Care Providers     Provider Role Specialty Phone number    Tu Reyes MD  Internal Medicine [phone]            See the Encounter Report to view  Anticoagulation Flowsheet and Dosing Calendar (Go to Encounters tab in chart review, and find the Anticoagulation Therapy Visit)    Chayito Sanders RN

## 2018-08-20 NOTE — MR AVS SNAPSHOT
Fausto Farr   8/20/2018 8:30 AM   Anticoagulation Therapy Visit    Description:  78 year old male   Provider:  [facility]   Department:  [facility]           INR as of 8/20/2018     Today's INR 3.0      Anticoagulation Summary as of 8/20/2018     INR goal 2.5-3.5   Today's INR 3.0   Full warfarin instructions 5 mg on Mon; 7.5 mg all other days   Next INR check 9/17/2018    Indications   AF (atrial fibrillation) (H) [I48.91]  S/P mitral valve replacement [Z95.2]  Long-term (current) use of anticoagulants [Z79.01] [Z79.01]         Your next Anticoagulation Clinic appointment(s)     Sep 17, 2018  8:30 AM CDT   Anticoagulation Visit with  ANTICOAGULATION CLINIC   [facility] ([facility])    [address] [place] [address]  [address]  [facility] [address] [phone]              Contact Numbers     [facility]  Please call  [phone] to cancel and/or reschedule your appointment   Please call  [phone] with any problems or questions regarding your therapy.        August 2018 Details    Sun Mon Tue Wed Thu Fri Sat        1               2               3               4                 5               6               7               8               9               10               11                 12               13               14               15               16               17               18                 19               20      5 mg   See details      21      7.5 mg         22      7.5 mg         23      7.5 mg         24      7.5 mg         25      7.5 mg           26      7.5 mg         27      5 mg         28      7.5 mg         29      7.5 mg         30      7.5 mg         31      7.5 mg           Date Details   08/20 This INR check               How to take your warfarin dose     To take:  5 mg Take 1 of the 5 mg tablets.    To take:  7.5 mg Take 1.5 of the 5 mg tablets.           September 2018 Details    Sun Mon Tue Wed Thu Fri Sat            1      7.5 mg           2      7.5 mg         3      5 mg         4      7.5 mg         5      7.5 mg         6      7.5 mg         7      7.5 mg         8      7.5 mg           9      7.5 mg         10      5 mg         11      7.5 mg         12      7.5 mg         13      7.5 mg         14      7.5 mg         15      7.5 mg           16      7.5 mg         17            18               19               20               21               22                 23               24               25               26               27               28               29                 30                      Date Details   No additional details    Date of next INR:  9/17/2018         How to take your warfarin dose     To take:  5 mg Take 1 of the 5 mg tablets.    To take:  7.5 mg Take 1.5 of the 5 mg tablets.

## 2018-08-21 NOTE — TELEPHONE ENCOUNTER
Patient is seeing Dr. Vasquez on 09-19-18. Marta Casper NP's appt was cancelled. Enedina ANDERSON

## 2018-08-22 ENCOUNTER — TRANSFERRED RECORDS (OUTPATIENT)
Dept: HEALTH INFORMATION MANAGEMENT | Facility: CLINIC | Age: 78
End: 2018-08-22

## 2018-09-17 ENCOUNTER — ANTICOAGULATION THERAPY VISIT (OUTPATIENT)
Dept: NURSING | Facility: CLINIC | Age: 78
End: 2018-09-17
Payer: MEDICARE

## 2018-09-17 DIAGNOSIS — I48.91 AF (ATRIAL FIBRILLATION) (H): ICD-10-CM

## 2018-09-17 DIAGNOSIS — Z95.2 S/P MITRAL VALVE REPLACEMENT: ICD-10-CM

## 2018-09-17 DIAGNOSIS — Z79.01 LONG-TERM (CURRENT) USE OF ANTICOAGULANTS: ICD-10-CM

## 2018-09-17 LAB — INR POINT OF CARE: 1.9 (ref 0.86–1.14)

## 2018-09-17 PROCEDURE — 36416 COLLJ CAPILLARY BLOOD SPEC: CPT

## 2018-09-17 PROCEDURE — 99207 ZZC NO CHARGE NURSE ONLY: CPT

## 2018-09-17 PROCEDURE — 85610 PROTHROMBIN TIME: CPT | Mod: QW

## 2018-09-17 NOTE — PROGRESS NOTES
ANTICOAGULATION FOLLOW-UP CLINIC VISIT    Patient Name:  Fausto Farr  Date:  9/17/2018  Contact Type:  Face to Face    SUBJECTIVE:     Patient Findings     Positives Other complaints    Comments Patient states he had been bleeding from several scabs so he lowered his dose.  Chaparrita Fan RN             OBJECTIVE    INR Protime   Date Value Ref Range Status   09/17/2018 1.9 (A) 0.86 - 1.14 Final       ASSESSMENT / PLAN  INR assessment SUB    Recheck INR In: 4 WEEKS    INR Location Clinic      Anticoagulation Summary as of 9/17/2018     INR goal 2.5-3.5   Today's INR 1.9!   Warfarin maintenance plan 5 mg (5 mg x 1) on Mon; 7.5 mg (5 mg x 1.5) all other days   Full warfarin instructions 5 mg on Mon; 7.5 mg all other days   Weekly warfarin total 50 mg   No change documented Chaparrita Fan RN   Plan last modified Caryn Campos RN (11/9/2017)   Next INR check 10/15/2018   Priority INR   Target end date Indefinite    Indications   AF (atrial fibrillation) (H) [I48.91]  S/P mitral valve replacement [Z95.2]  Long-term (current) use of anticoagulants [Z79.01] [Z79.01]         Anticoagulation Episode Summary     INR check location     Preferred lab     Send INR reminders to [facility]    Comments 5mg tabs - andrade dose // transfer from [facility] // [facility] // CALENDAR      Anticoagulation Care Providers     Provider Role Specialty Phone number    Tu Reyes MD  Internal Medicine [phone]            See the Encounter Report to view Anticoagulation Flowsheet and Dosing Calendar (Go to Encounters tab in chart review, and find the Anticoagulation Therapy Visit)    Dosage adjustment made based on physician directed care plan.    Chaparrita Fan RN

## 2018-09-17 NOTE — MR AVS SNAPSHOT
Fausto Farr   9/17/2018 8:30 AM   Anticoagulation Therapy Visit    Description:  78 year old male   Provider:  [facility]   Department:  [facility]           INR as of 9/17/2018     Today's INR 1.9!      Anticoagulation Summary as of 9/17/2018     INR goal 2.5-3.5   Today's INR 1.9!   Full warfarin instructions 5 mg on Mon; 7.5 mg all other days   Next INR check 10/15/2018    Indications   AF (atrial fibrillation) (H) [I48.91]  S/P mitral valve replacement [Z95.2]  Long-term (current) use of anticoagulants [Z79.01] [Z79.01]         Your next Anticoagulation Clinic appointment(s)     Oct 15, 2018  8:45 AM CDT   Anticoagulation Visit with  ANTICOAGULATION CLINIC   [facility] ([facility])    [address] [place] [address] [address]  [facility] [address] [phone]              Contact Numbers     [facility]  Please call  [phone] to cancel and/or reschedule your appointment   Please call  [phone] with any problems or questions regarding your therapy.        September 2018 Details    Sun Mon Tue Wed Thu Fri Sat           1                 2               3               4               5               6               7               8                 9               10               11               12               13               14               15                 16               17      5 mg   See details      18      7.5 mg         19      7.5 mg         20      7.5 mg         21      7.5 mg         22      7.5 mg           23      7.5 mg         24      5 mg         25      7.5 mg         26      7.5 mg         27      7.5 mg         28      7.5 mg         29      7.5 mg           30      7.5 mg                Date Details   09/17 This INR check               How to take your warfarin dose     To take:  5 mg Take 1 of the 5 mg tablets.    To take:  7.5 mg Take 1.5 of the 5 mg tablets.           October 2018 Details    Sun Mon Tue Wed Thu Fri Sat       1      5 mg         2      7.5 mg         3      7.5 mg         4      7.5 mg         5      7.5 mg         6      7.5 mg           7      7.5 mg         8      5 mg         9      7.5 mg         10      7.5 mg         11      7.5 mg         12      7.5 mg         13      7.5 mg           14      7.5 mg         15            16               17               18               19               20                 21               22               23               24               25               26               27                 28               29               30               31                   Date Details   No additional details    Date of next INR:  10/15/2018         How to take your warfarin dose     To take:  5 mg Take 1 of the 5 mg tablets.    To take:  7.5 mg Take 1.5 of the 5 mg tablets.

## 2018-09-19 ENCOUNTER — OFFICE VISIT (OUTPATIENT)
Dept: CARDIOLOGY | Facility: CLINIC | Age: 78
End: 2018-09-19
Attending: INTERNAL MEDICINE
Payer: MEDICARE

## 2018-09-19 VITALS
WEIGHT: 224 LBS | HEART RATE: 60 BPM | SYSTOLIC BLOOD PRESSURE: 138 MMHG | DIASTOLIC BLOOD PRESSURE: 72 MMHG | HEIGHT: 66 IN | BODY MASS INDEX: 36 KG/M2

## 2018-09-19 DIAGNOSIS — I45.2 BIFASCICULAR BLOCK: Primary | ICD-10-CM

## 2018-09-19 DIAGNOSIS — I49.3 PVC'S (PREMATURE VENTRICULAR CONTRACTIONS): ICD-10-CM

## 2018-09-19 DIAGNOSIS — R06.09 DYSPNEA ON EXERTION: ICD-10-CM

## 2018-09-19 PROCEDURE — 99215 OFFICE O/P EST HI 40 MIN: CPT | Performed by: INTERNAL MEDICINE

## 2018-09-19 PROCEDURE — 93000 ELECTROCARDIOGRAM COMPLETE: CPT | Performed by: INTERNAL MEDICINE

## 2018-09-19 NOTE — PROGRESS NOTES
"Service Date: 09/19/2018      HISTORY OF PRESENT ILLNESS:    I had the pleasure of seeing Mr. Fausto Farr, a very pleasant 78-year-old male for AV conduction disease and possible chronotropic incompetence.  The patient saw Dr. Novak in 2016 for atrial arrhythmia.      Mr. Farr has the following cardiovascular issues:   1.  Valvular heart disease.  AVR in 2006 with subsequent perivalvular leak and redo mechanical AVR with concomitant mechanical MVR in 2013.   2.  Chronic diastolic heart failure.  LVEF is 55%-60%.   3.  Previous endovascular AAA repair.   4.  Coronary artery disease with CABG (LIMA to LAD and radial graft to RCA) in 2006.   5.  AV conduction disease with \"trifascicular\" block.   6.  Obstructive sleep apnea with use of CPAP.      Mr. Farr was hospitalized in 05/2018 with a group B strep and left leg cellulitis.  There was report of both atrial and brief ventricular arrhythmias during this hospital stay.  The patient subsequently saw Dr. Santo, who was concerned about his shortness of breath with exertion and wondered whether this may be related to chronotropic incompetence or development of AV block with exertion.  The patient had a stress test on 07/13/2018 where he lasted only 4 minutes and 25 seconds on a modified Waqas protocol.  He achieved a peak heart rate of only 106 beats per minute.  He stopped because of back and hip pain rather than fatigue or shortness of breath.  There was no AV block with exertion.      The patient later had a 24-hour Holter monitor that showed frequent PVCs, burden of approximately 10%.  There were episodes of possible transient second-degree AV block and a 15-beat run of AIVR.  No high-grade bradycardia or tachycardia was noted.      The patient has not had syncope or near-syncope.  He tells me that his main limitation with exertion is pain his lower back (he points around the area of his coccyx and buttocks to describe this, which is predictable, but " interestingly does not occur if he walks leaning forward.  For example, supporting himself on a shopping cart.      PHYSICAL EXAMINATION:   VITAL SIGNS:  Blood pressure 148/68, pulse 60 and regular, weight 101 kg, height 166 cm.  He is a pleasant gentleman who is moderately overweight, in no apparent distress.     HEENT:  Head normocephalic.   NECK:  Supple with 2+ carotid pulses, no bruits.   LUNGS:  Clear.   CARDIOVASCULAR:  Normal JVP, regular rhythm with frequent ectopic beats, a crisp metallic S1 and S2 with a 1/6 systolic ejection murmur at the base.  No S3.   ABDOMEN:  Obese, soft, nontender.   EXTREMITIES:  Trace edema.  I do appreciate a right posterior tibial pulse but the left is difficult to discern.   BACK: no CVA tenderness.  NEURO: alert and oriented x .2  SKIN: no rashes.     DIAGNOSTIC STUDIES:    His electrocardiogram today showed sinus rhythm with degree AV block, right bundle branch block and left anterior fascicular block.  Frequent PACs.      Most recent echocardiogram was a transesophageal study in 05/2018 that showed an EF of 55%, normal functioning mechanical mitral and aortic valves.      IMPRESSION:    1.  Dyspnea on exertion.  I was mostly struck by the patient's predictable lower back pain with exertion that sounds like claudication, though it may be pseudoclaudication.  This is the most limiting symptom for the patient (rather than BRISENO).  I note that he recently saw Dr. Chavez of [facility] who apparently told him there is no serious structural problem with his back (this is as per patient's report).  The patient has had multiple complex vascular studies in the past which I do not have the expertise to interpret.  I recommended that he see a vascular specialist for an opinion regarding whether this symptom represents claudication.  I note he also has an endovascularly repaired AAA.      His AV conduction system is diseased with first degree AV conduction delay and  bifascicular block.  However, I could not identify clear high-grade AV block or symptoms (syncope, near syncope) to justify pacemaker implantation.  I discussed with Mr. Vaughn that AV conduction disease is progressive.  It is possible he will require pacemaker implantation in the future.  Again, for the time being, there is no immediate indication for a pacemaker.      RECOMMENDATIONS:   1.  Arrange for an appointment with our vascular cardiologists in [place].   2.  A 10-day ZioPatch has been ordered for 2019 to reassess his cardiac rhythm and specifically look for intermittent AV block.      Thank you for the opportunity to be part of his care.         SHALA RIOS MD, [facility]             D: 2018   T: 2018   MT: RUSTY      Name:     LIANG VAUGHN   MRN:      [id number]        Account:      [id number]   :      1940           Service Date: 2018      Document: [id number]

## 2018-09-19 NOTE — PROGRESS NOTES
HPI and Plan:   See dictation    Orders Placed This Encounter   Procedures     Follow-Up with Cardiologist     EKG 12-lead complete w/read - Clinics (performed today)     Zio Patch Monitor       No orders of the defined types were placed in this encounter.      There are no discontinued medications.      Encounter Diagnosis   Name Primary?     PVC's (premature ventricular contractions)        CURRENT MEDICATIONS:  Current Outpatient Prescriptions   Medication Sig Dispense Refill     aspirin 81 MG EC tablet Take 1 tablet (81 mg) by mouth daily 1 tablet 0     betamethasone valerate (VALISONE) 0.1 % lotion APPLY TOPICALLY TWICE DAILY PRN 60 mL 3     ezetimibe (ZETIA) 10 MG tablet Take 1 tablet (10 mg) by mouth daily 90 tablet 3     fluocinonide (LIDEX) 0.05 % ointment 2- 30 gram tubes.  Apply twice a day as needed. 60 g 2     furosemide (LASIX) 40 MG tablet Take 0.5 tablets (20 mg) by mouth daily 45 tablet 3     mesalamine (ASACOL HD) 800 MG EC tablet Take 1 tablet (800 mg) by mouth 2 times daily 180 tablet 3     metoprolol tartrate (LOPRESSOR) 25 MG tablet Take 1 tablet (25 mg) by mouth 2 times daily 180 tablet 3     Potassium Chloride ER 20 MEQ TBCR Take 1 tablet (20 mEq) by mouth daily 30 tablet 1     rosuvastatin (CRESTOR) 40 MG tablet Take 1 tablet (40 mg) by mouth daily 90 tablet 3     tamsulosin (FLOMAX) 0.4 MG capsule Take 1 capsule (0.4 mg) by mouth daily 90 capsule 3     warfarin (COUMADIN) 5 MG tablet Take 1 1/2 tablets (7.5 mg) by mouth TWTFSS; 1 tablet (5 mg) on Mondays OR as directed by INR Clinic 135 tablet 3       ALLERGIES     Allergies   Allergen Reactions     Bees Anaphylaxis       PAST MEDICAL HISTORY:  Past Medical History:   Diagnosis Date     Abdominal pain      Abnormal ECG     RBBB, 1st degree AVB, Left axis deviation     Anemia     currently taking iron     Arrhythmia     pac, pvc     Back pain     since 1980     Bruit      CAD (coronary artery disease)      Cellulitis 10/18/12     Cellulitis  05/2018    GrpB strep LLE cellulitis  negative RACHAEL for veg     Contact dermatitis and other eczema, due to unspecified cause      Diaphragmatic hernia without mention of obstruction or gangrene      Gastric ulcer      Glucose intolerance (impaired glucose tolerance)      Heart murmur 9/16/13    valvular heart disease     Hyperlipidaemia      Hypertension 8/6/13     Lumbago      Malaise and fatigue      Metabolic syndrome      Mobitz (type) I (Wenckebach's) atrioventricular block     and RBBB     Nocturia 10/18/12     Nocturia      Nonallopathic lesion of cervical region      Nonallopathic lesion of lumbar region      Nonallopathic lesion of pelvic region, not elsewhere classified      Nonallopathic lesion of rib cage      Nonallopathic lesion of sacral region      Paroxysmal atrial fibrillation (H) 10/18/12     Prostate cancer (H) 2008    radiation seed, XRT      PVD (peripheral vascular disease) (H)      Rotator cuff strain     and sprain     S/P aortic valve replacement 2006    developed perivalve leak and MS, therefore redo surg 2013     S/P CABG (coronary artery bypass graft) 2006    Lima-Lad, RA-Rca     Sciatica of left side     since 2000     Sepsis due to group B Streptococcus (H) 5/19/2018     Sleep apnea     pt declined cpap     Ulcerative colitis (H)      Vitamin D deficiency        PAST SURGICAL HISTORY:  Past Surgical History:   Procedure Laterality Date     AORTIC VALVE REPLACEMENT  1/3/06    redo AVR SJM 21mm and SJM MVR 27mm in 2013SJM 21([id number]):AVR, SJM 27 :MVR-     ARTHROPLASTY KNEE      right knee     BACK SURGERY  Oct 2015    Fusion L4-5, laminectomy L2, L3     BYPASS GRAFT ARTERY CORONARY  10/2013    reimplantation of radial artery graft to RCA     C CABG, VEIN, TWO  1/3/06    Left radial to RCA, LIMA to LAD (RA to RCA reimplanted at time of 2013 surg)     CARDIAC CATHERIZATION  11/2005    Stent placed to RCA     CARDIAC CATHERIZATION  04/2013    Occluded RCA, patent LIMA to LAD and  radial graft to PDA     CARPAL TUNNEL RELEASE RT/LT  1994     COLONOSCOPY  8-22-11     CYSTOSCOPY FLEXIBLE  10/16/2013    Procedure: CYSTOSCOPY FLEXIBLE;  FLEXIBLE CYSTOSCOPY / DILATION OF URETHRA / INSERTION OF LESLIE;  Surgeon: Cooper Wallace MD;  Location: [facility] OR     ENDOVASCULAR REPAIR ANEURYSM ABDOMINAL AORTA  2006     ENDOVASCULAR REPAIR, INFRARENAL ABDOMINAL AORTIC ANEURYSM/DISSECTION; MODULAR BIFURCATED PROSTHESIS      AAA repair endovascular     ENT SURGERY       GENITOURINARY SURGERY  6/16/08    radioactive seeding     HEAD & NECK SURGERY  1997    vocal cord polypectomy     KNEE SURGERY  2001 Right knee arthroscopy     OPTICAL TRACKING SYSTEM FUSION SPINE POSTERIOR LUMBAR THREE+ LEVELS N/A 10/29/2015    Procedure: OPTICAL TRACKING SYSTEM FUSION SPINE POSTERIOR LUMBAR THREE+ LEVELS;  Surgeon: Walt Garcia MD;  Location: [facility] OR     PROSTATE SURGERY  06/16/2008 Radioactive seeding     PROSTATE SURGERY      radioactive seeding 6/16/08     REPAIR ANEURYSM ABDOMINAL AORTA  06/08     REPAIR VALVE MITRAL  10/16/2013    SJM 21([id number]):AVR, SJM 27 :MVRProcedure: REPAIR VALVE MITRAL;  REDO STERNOTOMY/REDO AORTIC VALVE REPLACEMENT/ MITRAL VALVE REPLACEMENT/REIMPLANTATION OF RIGHT CORONARY ARTERY BYPASS WITH RACHAEL ( ON PUMP);  Surgeon: Viet Singh MD;  Location:  OR     REPLACE VALVE AORTIC  10/16/2013    Procedure: REPLACE VALVE AORTIC;;  Surgeon: Viet Singh MD;  Location: [facility] OR     SURGERY GENERAL IP CONSULT  05/2008 Excision aneurysm abdominal aorta     SURGERY GENERAL IP CONSULT  1997 Vocal cord polypectomy     VASCULAR SURGERY  1968, 1993     varicose vein stripping       FAMILY HISTORY:  Family History   Problem Relation Age of Onset     Coronary Artery Disease Father      CABG     HEART DISEASE Father      Pacemaker     Other Cancer Daughter      HEART DISEASE Brother      Other - See Comments Grandchild        SOCIAL HISTORY:  Social History     Social History     Marital  status:      Spouse name: N/A     Number of children: N/A     Years of education: N/A     Social History Main Topics     Smoking status: Former Smoker     Packs/day: 1.00     Years: 40.00     Quit date: 10/23/2002     Smokeless tobacco: Never Used     Alcohol use Yes      Comment: a couple beers per week     Drug use: No     Sexual activity: No     Other Topics Concern     Parent/Sibling W/ Cabg, Mi Or Angioplasty Before 65f 55m? Yes     Brother had bypass at 55     Caffeine Concern No     6-8 cups of half and half per day     Special Diet No     Exercise No     Social History Narrative       Review of Systems:  Skin:  Negative rash     Eyes:  Positive for glasses    ENT:    hearing loss    Respiratory:  Positive for dyspnea on exertion     Cardiovascular:  Negative      Gastroenterology: Negative      Genitourinary:  Positive for nocturia    Musculoskeletal:  Positive for back pain;arthritis;joint pain    Neurologic:  Negative      Psychiatric:  Negative      Heme/Lymph/Imm:  Negative      Endocrine:  Negative        [id number]

## 2018-09-19 NOTE — MR AVS SNAPSHOT
After Visit Summary   9/19/2018    Fausto Farr    MRN: [id number]           Patient Information     Date Of Birth          1940        Visit Information        Provider Department      9/19/2018 1:45 PM Jessy Vasquez MD [facility]        Today's Diagnoses     PVC's (premature ventricular contractions)           Follow-ups after your visit        Additional Services     Follow-Up with Cardiologist                 Your next 10 appointments already scheduled     Oct 01, 2018  8:30 AM CDT   Return Visit with Ebenezer Wyatt MD   [facility] ([facility])    [address]  [place] [address]   [phone]            Oct 15, 2018  8:45 AM CDT   Anticoagulation Visit with  ANTICOAGULATION CLINIC   [facility] ([facility])    [address]  [address]  [place] [address] [phone]            Oct 19, 2018  9:00 AM CDT   Return Sleep Patient with Isrrael Dobbins MD   [facility] ([facility])    [address]  [place] [address]   [phone]              Future tests that were ordered for you today     Open Future Orders        Priority Expected Expires Ordered    Follow-Up with Cardiologist Routine 10/19/2018 9/19/2019 9/19/2018    Zio Patch Monitor Routine 3/26/2019 9/19/2019 9/19/2018            Who to contact     If you have questions or need follow up information about today's clinic visit or your schedule please contact [facility] directly at [phone].  Normal or non-critical lab and imaging results will be communicated to you by MyChart, letter or phone within 4 business days after the clinic has received the results. If you do not hear from us within 7 days, please contact the clinic through MyChart or  "phone. If you have a critical or abnormal lab result, we will notify you by phone as soon as possible.  Submit refill requests through [employer] or call your pharmacy and they will forward the refill request to us. Please allow 3 business days for your refill to be completed.          Additional Information About Your Visit        Care EveryWhere ID     This is your Care EveryWhere ID. This could be used by other organizations to access your [place] medical records  [id number]        Your Vitals Were     Pulse Height BMI (Body Mass Index)             60 1.664 m (5' 5.5\") 36.71 kg/m2          Blood Pressure from Last 3 Encounters:   09/19/18 148/68   07/23/18 132/78   07/12/18 122/70    Weight from Last 3 Encounters:   09/19/18 101.6 kg (224 lb)   07/23/18 100.2 kg (221 lb)   07/12/18 98.4 kg (217 lb)              We Performed the Following     EKG 12-lead complete w/read - Clinics (performed today)     Follow-Up with Electrophysiologist        Primary Care Provider Office Phone # Fax #    Tu Reyes [phone] [address] [place] [address]        Equal Access to Services     LOU MARTINS AH: Hadii ted bermudezo Sobridger, waaxda luqadaha, qaybta kaalmada adediazyada, denny goldberg. So [facility] [phone].    ATENCIÓN: Si habla español, tiene a brown disposición servicios gratuitos de asistencia lingüística. Tangame al [phone].    We comply with applicable federal civil rights laws and [place] laws. We do not discriminate on the basis of race, color, national origin, age, disability, sex, sexual orientation, or gender identity.            Thank you!     Thank you for choosing [facility]  for your care. Our goal is always to provide you with excellent care. Hearing back from our patients is one way we can continue to improve our services. Please take a few minutes to complete the written survey that you " may receive in the mail after your visit with us. Thank you!             Your Updated Medication List - Protect others around you: Learn how to safely use, store and throw away your medicines at www.disposemymeds.org.          This list is accurate as of 9/19/18  2:25 PM.  Always use your most recent med list.                   Brand Name Dispense Instructions for use Diagnosis    aspirin 81 MG EC tablet     1 tablet    Take 1 tablet (81 mg) by mouth daily        betamethasone valerate 0.1 % lotion    VALISONE    60 mL    APPLY TOPICALLY TWICE DAILY PRN    Eczema, unspecified type       ezetimibe 10 MG tablet    ZETIA    90 tablet    Take 1 tablet (10 mg) by mouth daily    Mixed hyperlipidemia       fluocinonide 0.05 % ointment    LIDEX    60 g    2- 30 gram tubes.  Apply twice a day as needed.    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)       metoprolol tartrate 25 MG tablet    LOPRESSOR    180 tablet    Take 1 tablet (25 mg) by mouth 2 times daily    Coronary artery disease involving coronary bypass graft 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       Potassium Chloride ER 20 MEQ Tbcr     30 tablet    Take 1 tablet (20 mEq) by mouth daily    Vitamin D deficiency, Left leg cellulitis, Essential hypertension, benign, Atrial fibrillation, unspecified type (H)       rosuvastatin 40 MG tablet    CRESTOR    90 tablet    Take 1 tablet (40 mg) by mouth daily    Hyperlipidemia, unspecified hyperlipidemia type       tamsulosin 0.4 MG capsule    FLOMAX    90 capsule    Take 1 capsule (0.4 mg) by mouth daily    Urinary retention       warfarin 5 MG tablet    COUMADIN    135 tablet    Take 1 1/2 tablets (7.5 mg) by mouth TWTFSS; 1 tablet (5 mg)  on Mondays OR as directed by INR Clinic    Long-term (current) use of anticoagulants, S/P aortic valve replacement

## 2018-09-19 NOTE — LETTER
"9/19/2018      Tu Reyes MD, MD  [address] [place] [address]      RE: Fausto Farr       Dear Colleague,    I had the pleasure of seeing Fausto Farr in the [facility].    Service Date: 09/19/2018      HISTORY OF PRESENT ILLNESS:  I had the pleasure of seeing Mr. Fausto Farr, a very pleasant 78-year-old male for AV conduction disease and possible chronotropic incompetence.  The patient has previously seen my colleague, Dr. Novak, in 2016 for atrial arrhythmia.      Mr. Farr has the following cardiovascular issues:   1.  Valvular heart disease.  AVR in 2006 with subsequent perivalvular leak and redo AVR with concomitant MVR in 2013.   2.  Chronic diastolic heart failure.  LVEF is 55%-60%.   3.  Previous endovascular AAA repair.   4.  Coronary artery disease with bypass, LIMA to LAD and radial graft to RCA in 2006.   5.  AV conduction disease with \"trifascicular\" block.   6.  Obstructive sleep apnea with use of CPAP.      Mr. Farr was hospitalized in 05/2018 with a group B strep and left leg cellulitis.  There was report of both atrial and brief ventricular arrhythmias during this hospital stay.  The patient subsequently saw Dr. Santo, who was concerned about his increasing shortness of breath with exertion and I wonder whether this may be related to chronotropic incompetence or development of AV block with exertion.  The patient had a stress test on 07/13/2018 where he lasted only 4 minutes and 25 seconds on a modified Waqas protocol and achieved a peak heart rate of only 106 beats per minute.  However, he stopped because of back and hip pain rather than fatigue or shortness of breath.  There was no induced second or third-degree AV block with exertion.      Subsequently, the patient had a 24-hour Holter monitor that showed frequent PVCs, a burden of approximately 10%.  There were episodes of possible transient second-degree AV block.  There was " a 15-beat run of AIVR.  Certainly no grade bradycardia or tachycardia was noted.      The patient has not had syncope or near-syncope.  He tells me that his main limitation with exertion is pain his lower back (he points around the area of his coccyx and buttocks to describe this, which is predictable, but interestingly does not occur if he walks leaning forward.  For example, supporting himself on a shopping cart.      PHYSICAL EXAMINATION:   VITAL SIGNS:  Blood pressure 148/68, pulse 60 and regular, weight 101 kg, height 166 cm.  He is a pleasant gentleman who is moderately overweight, in no apparent distress.     HEENT:  Head normocephalic.   NECK:  Supple with 2+ carotid pulses, no bruits.   LUNGS:  Clear.   CARDIOVASCULAR:  Normal JVP, regular rhythm with frequent ectopic beats, a crisp metallic S1 and S2 with a 1/6 systolic ejection murmur at the base.  No S3.   ABDOMEN:  Obese, soft, nontender.   EXTREMITIES:  Trace edema.  I do appreciate right posterior tibial pulse, although the left is more difficult to discern.      DIAGNOSTIC STUDIES:  His electrocardiogram today showed sinus rhythm with degree AV block, right bundle branch block and left anterior fascicular block.  There were frequent PACs.      Most recent echocardiogram was a transesophageal study in 05/2018 that showed an EF of 55%, normal functioning mechanical mitral and aortic valves.      IMPRESSION:  Dyspnea on exertion.  Today, I am mostly struck by the patient's lower back pain with exertion that sounds like claudication, though it could be pseudoclaudication.  This appears to be the limiting symptom for the patient rather than shortness of breath.  I note that he recently saw Dr. Chavez from [facility] who apparently told him there is no apparent structural problem with his back (as per the patient's report).  The patient has had a complex vascular studies in the past that I did not have the expertise to interpret.  I think he  "should see one of our vascular specialists to see if this symptom represents a form of claudication.  I do note that he has a repaired endovascular repair AAA.      With regard to his AV conduction system, it is clearly diseased with evidence of degree AV conduction delay and bifascicular block ( previously labeled \"trifascicular block\" which is misnomer).  However, I do not see clearcut high-grade AV block or symptoms to justify pacemaker implantation.  I discussed with Mr. Vaughn that if AV conduction disease is progressive, it is entirely possible that he will require pacemaker implantation in the future as I expect the situation to worsen with time.  However, for the time being, I do not see any immediate need for pacemaker.      RECOMMENDATIONS:   1.  Arrange for an appointment with our vascular cardiologists in [place].   2.  A 10-day ZIO Patch has been ordered for 2019 to reassess his cardiac rhythm and look for intermittent AV block.      Thank you for the opportunity to be part of his care.         SHALA RIOS MD             D: 2018   T: 2018   MT: RUSTY      Name:     LIANG VAUGHN   MRN:      [id number]        Account:      [id number]   :      1940           Service Date: 2018      Document: [id number]         Outpatient Encounter Prescriptions as of 2018   Medication Sig Dispense Refill     aspirin 81 MG EC tablet Take 1 tablet (81 mg) by mouth daily 1 tablet 0     betamethasone valerate (VALISONE) 0.1 % lotion APPLY TOPICALLY TWICE DAILY PRN 60 mL 3     ezetimibe (ZETIA) 10 MG tablet Take 1 tablet (10 mg) by mouth daily 90 tablet 3     fluocinonide (LIDEX) 0.05 % ointment 2- 30 gram tubes.  Apply twice a day as needed. 60 g 2     furosemide (LASIX) 40 MG tablet Take 0.5 tablets (20 mg) by mouth daily 45 tablet 3     mesalamine (ASACOL HD) 800 MG EC tablet Take 1 tablet (800 mg) by mouth 2 times daily 180 tablet 3     metoprolol tartrate (LOPRESSOR) 25 " MG tablet Take 1 tablet (25 mg) by mouth 2 times daily 180 tablet 3     Potassium Chloride ER 20 MEQ TBCR Take 1 tablet (20 mEq) by mouth daily 30 tablet 1     rosuvastatin (CRESTOR) 40 MG tablet Take 1 tablet (40 mg) by mouth daily 90 tablet 3     tamsulosin (FLOMAX) 0.4 MG capsule Take 1 capsule (0.4 mg) by mouth daily 90 capsule 3     warfarin (COUMADIN) 5 MG tablet Take 1 1/2 tablets (7.5 mg) by mouth TWTFSS; 1 tablet (5 mg) on Mondays OR as directed by INR Clinic 135 tablet 3     No facility-administered encounter medications on file as of 9/19/2018.        Again, thank you for allowing me to participate in the care of your patient.      Sincerely,    Jessy Vasquez MD     [facility]

## 2018-09-19 NOTE — LETTER
9/19/2018    Tu Reyes MD, MD  [address] [place] [address]    RE: Fausto Farr       Dear Colleague,    I had the pleasure of seeing Fausto Farr in the [facility].    HPI and Plan:   See dictation    Orders Placed This Encounter   Procedures     Follow-Up with Cardiologist     EKG 12-lead complete w/read - Clinics (performed today)     Zio Patch Monitor       No orders of the defined types were placed in this encounter.      There are no discontinued medications.      Encounter Diagnosis   Name Primary?     PVC's (premature ventricular contractions)        CURRENT MEDICATIONS:  Current Outpatient Prescriptions   Medication Sig Dispense Refill     aspirin 81 MG EC tablet Take 1 tablet (81 mg) by mouth daily 1 tablet 0     betamethasone valerate (VALISONE) 0.1 % lotion APPLY TOPICALLY TWICE DAILY PRN 60 mL 3     ezetimibe (ZETIA) 10 MG tablet Take 1 tablet (10 mg) by mouth daily 90 tablet 3     fluocinonide (LIDEX) 0.05 % ointment 2- 30 gram tubes.  Apply twice a day as needed. 60 g 2     furosemide (LASIX) 40 MG tablet Take 0.5 tablets (20 mg) by mouth daily 45 tablet 3     mesalamine (ASACOL HD) 800 MG EC tablet Take 1 tablet (800 mg) by mouth 2 times daily 180 tablet 3     metoprolol tartrate (LOPRESSOR) 25 MG tablet Take 1 tablet (25 mg) by mouth 2 times daily 180 tablet 3     Potassium Chloride ER 20 MEQ TBCR Take 1 tablet (20 mEq) by mouth daily 30 tablet 1     rosuvastatin (CRESTOR) 40 MG tablet Take 1 tablet (40 mg) by mouth daily 90 tablet 3     tamsulosin (FLOMAX) 0.4 MG capsule Take 1 capsule (0.4 mg) by mouth daily 90 capsule 3     warfarin (COUMADIN) 5 MG tablet Take 1 1/2 tablets (7.5 mg) by mouth TWTFSS; 1 tablet (5 mg) on Mondays OR as directed by INR Clinic 135 tablet 3       ALLERGIES     Allergies   Allergen Reactions     Bees Anaphylaxis       PAST MEDICAL HISTORY:  Past Medical History:   Diagnosis Date     Abdominal pain       Abnormal ECG     RBBB, 1st degree AVB, Left axis deviation     Anemia     currently taking iron     Arrhythmia     pac, pvc     Back pain     since 1980     Bruit      CAD (coronary artery disease)      Cellulitis 10/18/12     Cellulitis 05/2018    GrpB strep LLE cellulitis  negative RACHAEL for veg     Contact dermatitis and other eczema, due to unspecified cause      Diaphragmatic hernia without mention of obstruction or gangrene      Gastric ulcer      Glucose intolerance (impaired glucose tolerance)      Heart murmur 9/16/13    valvular heart disease     Hyperlipidaemia      Hypertension 8/6/13     Lumbago      Malaise and fatigue      Metabolic syndrome      Mobitz (type) I (Wenckebach's) atrioventricular block     and RBBB     Nocturia 10/18/12     Nocturia      Nonallopathic lesion of cervical region      Nonallopathic lesion of lumbar region      Nonallopathic lesion of pelvic region, not elsewhere classified      Nonallopathic lesion of rib cage      Nonallopathic lesion of sacral region      Paroxysmal atrial fibrillation (H) 10/18/12     Prostate cancer (H) 2008    radiation seed, XRT      PVD (peripheral vascular disease) (H)      Rotator cuff strain     and sprain     S/P aortic valve replacement 2006    developed perivalve leak and MS, therefore redo surg 2013     S/P CABG (coronary artery bypass graft) 2006    Lima-Lad, RA-Rca     Sciatica of left side     since 2000     Sepsis due to group B Streptococcus (H) 5/19/2018     Sleep apnea     pt declined cpap     Ulcerative colitis (H)      Vitamin D deficiency        PAST SURGICAL HISTORY:  Past Surgical History:   Procedure Laterality Date     AORTIC VALVE REPLACEMENT  1/3/06    redo AVR SJM 21mm and SJM MVR 27mm in 2013SJM 21([id number]):AVR, SJM 27 :MVR-     ARTHROPLASTY KNEE      right knee     BACK SURGERY  Oct 2015    Fusion L4-5, laminectomy L2, L3     BYPASS GRAFT ARTERY CORONARY  10/2013    reimplantation of radial artery graft to RCA     C  CABG, VEIN, TWO  1/3/06    Left radial to RCA, LIMA to LAD (RA to RCA reimplanted at time of 2013 surg)     CARDIAC CATHERIZATION  11/2005    Stent placed to RCA     CARDIAC CATHERIZATION  04/2013    Occluded RCA, patent LIMA to LAD and radial graft to PDA     CARPAL TUNNEL RELEASE RT/LT  1994     COLONOSCOPY  8-22-11     CYSTOSCOPY FLEXIBLE  10/16/2013    Procedure: CYSTOSCOPY FLEXIBLE;  FLEXIBLE CYSTOSCOPY / DILATION OF URETHRA / INSERTION OF LESLIE;  Surgeon: Cooper Wallace MD;  Location: [facility] OR     ENDOVASCULAR REPAIR ANEURYSM ABDOMINAL AORTA  2006     ENDOVASCULAR REPAIR, INFRARENAL ABDOMINAL AORTIC ANEURYSM/DISSECTION; MODULAR BIFURCATED PROSTHESIS      AAA repair endovascular     ENT SURGERY       GENITOURINARY SURGERY  6/16/08    radioactive seeding     HEAD & NECK SURGERY  1997    vocal cord polypectomy     KNEE SURGERY  2001 Right knee arthroscopy     OPTICAL TRACKING SYSTEM FUSION SPINE POSTERIOR LUMBAR THREE+ LEVELS N/A 10/29/2015    Procedure: OPTICAL TRACKING SYSTEM FUSION SPINE POSTERIOR LUMBAR THREE+ LEVELS;  Surgeon: Walt Garcia MD;  Location:  OR     PROSTATE SURGERY  06/16/2008 Radioactive seeding     PROSTATE SURGERY      radioactive seeding 6/16/08     REPAIR ANEURYSM ABDOMINAL AORTA  06/08     REPAIR VALVE MITRAL  10/16/2013    SJM 21([id number] [id number]):AVR, SJM 27 :MVRProcedure: REPAIR VALVE MITRAL;  REDO STERNOTOMY/REDO AORTIC VALVE REPLACEMENT/ MITRAL VALVE REPLACEMENT/REIMPLANTATION OF RIGHT CORONARY ARTERY BYPASS WITH RACHAEL ( ON PUMP);  Surgeon: Viet Singh MD;  Location:  OR     REPLACE VALVE AORTIC  10/16/2013    Procedure: REPLACE VALVE AORTIC;;  Surgeon: Viet Singh MD;  Location:  OR     SURGERY GENERAL IP CONSULT  05/2008 Excision aneurysm abdominal aorta     SURGERY GENERAL IP CONSULT  1997 Vocal cord polypectomy     VASCULAR SURGERY  1968, 1993     varicose vein stripping       FAMILY HISTORY:  Family History   Problem Relation Age of Onset      Coronary Artery Disease Father      CABG     HEART DISEASE Father      Pacemaker     Other Cancer Daughter      HEART DISEASE Brother      Other - See Comments Grandchild        SOCIAL HISTORY:  Social History     Social History     Marital status:      Spouse name: N/A     Number of children: N/A     Years of education: N/A     Social History Main Topics     Smoking status: Former Smoker     Packs/day: 1.00     Years: 40.00     Quit date: 10/23/2002     Smokeless tobacco: Never Used     Alcohol use Yes      Comment: a couple beers per week     Drug use: No     Sexual activity: No     Other Topics Concern     Parent/Sibling W/ Cabg, Mi Or Angioplasty Before 65f 55m? Yes     Brother had bypass at 55     Caffeine Concern No     6-8 cups of half and half per day     Special Diet No     Exercise No     Social History Narrative       Review of Systems:  Skin:  Negative rash     Eyes:  Positive for glasses    ENT:    hearing loss    Respiratory:  Positive for dyspnea on exertion     Cardiovascular:  Negative      Gastroenterology: Negative      Genitourinary:  Positive for nocturia    Musculoskeletal:  Positive for back pain;arthritis;joint pain    Neurologic:  Negative      Psychiatric:  Negative      Heme/Lymph/Imm:  Negative      Endocrine:  Negative        [id number]              Thank you for allowing me to participate in the care of your patient.      Sincerely,     Jessy Vasquez MD     [facility]    cc:   Calderon Santo MD  [address]  [place] [address]

## 2018-10-01 ENCOUNTER — OFFICE VISIT (OUTPATIENT)
Dept: CARDIOLOGY | Facility: CLINIC | Age: 78
End: 2018-10-01
Attending: INTERNAL MEDICINE
Payer: MEDICARE

## 2018-10-01 VITALS
BODY MASS INDEX: 36.32 KG/M2 | HEIGHT: 66 IN | SYSTOLIC BLOOD PRESSURE: 108 MMHG | HEART RATE: 55 BPM | OXYGEN SATURATION: 97 % | DIASTOLIC BLOOD PRESSURE: 70 MMHG | WEIGHT: 226 LBS

## 2018-10-01 DIAGNOSIS — Z98.890 S/P AAA REPAIR: ICD-10-CM

## 2018-10-01 DIAGNOSIS — I83.11 VARICOSE VEINS OF BOTH LOWER EXTREMITIES WITH INFLAMMATION: Primary | ICD-10-CM

## 2018-10-01 DIAGNOSIS — I73.9 CLAUDICATION (H): ICD-10-CM

## 2018-10-01 DIAGNOSIS — I83.93 VARICOSE VEINS OF BOTH LOWER EXTREMITIES: ICD-10-CM

## 2018-10-01 DIAGNOSIS — I83.12 VARICOSE VEINS OF BOTH LOWER EXTREMITIES WITH INFLAMMATION: Primary | ICD-10-CM

## 2018-10-01 DIAGNOSIS — Z86.79 S/P AAA REPAIR: ICD-10-CM

## 2018-10-01 DIAGNOSIS — I49.3 PVC'S (PREMATURE VENTRICULAR CONTRACTIONS): ICD-10-CM

## 2018-10-01 PROCEDURE — 99214 OFFICE O/P EST MOD 30 MIN: CPT | Performed by: INTERNAL MEDICINE

## 2018-10-01 NOTE — PROGRESS NOTES
HPI and Plan:   See dictation    Orders Placed This Encounter   Procedures     US KEERTHI Doppler Seg Pres w Ex (KEERTHI with Exercise)     US Venous Competency Bilateral       No orders of the defined types were placed in this encounter.      There are no discontinued medications.      Encounter Diagnoses   Name Primary?     PVC's (premature ventricular contractions)      Claudication (H)      Varicose veins of both lower extremities      Varicose veins of both lower extremities with inflammation Yes     S/P AAA repair        CURRENT MEDICATIONS:  Current Outpatient Prescriptions   Medication Sig Dispense Refill     aspirin 81 MG EC tablet Take 1 tablet (81 mg) by mouth daily 1 tablet 0     betamethasone valerate (VALISONE) 0.1 % lotion APPLY TOPICALLY TWICE DAILY PRN 60 mL 3     ezetimibe (ZETIA) 10 MG tablet Take 1 tablet (10 mg) by mouth daily 90 tablet 3     fluocinonide (LIDEX) 0.05 % ointment 2- 30 gram tubes.  Apply twice a day as needed. 60 g 2     furosemide (LASIX) 40 MG tablet Take 0.5 tablets (20 mg) by mouth daily 45 tablet 3     mesalamine (ASACOL HD) 800 MG EC tablet Take 1 tablet (800 mg) by mouth 2 times daily 180 tablet 3     metoprolol tartrate (LOPRESSOR) 25 MG tablet Take 1 tablet (25 mg) by mouth 2 times daily 180 tablet 3     Potassium Chloride ER 20 MEQ TBCR Take 1 tablet (20 mEq) by mouth daily 30 tablet 1     rosuvastatin (CRESTOR) 40 MG tablet Take 1 tablet (40 mg) by mouth daily 90 tablet 3     tamsulosin (FLOMAX) 0.4 MG capsule Take 1 capsule (0.4 mg) by mouth daily 90 capsule 3     warfarin (COUMADIN) 5 MG tablet Take 1 1/2 tablets (7.5 mg) by mouth TWTFSS; 1 tablet (5 mg) on Mondays OR as directed by [facility] Clinic 135 tablet 3       ALLERGIES     Allergies   Allergen Reactions     Bees Anaphylaxis       PAST MEDICAL HISTORY:  Past Medical History:   Diagnosis Date     Abdominal pain      Abnormal ECG     RBBB, 1st degree AVB, Left axis deviation     Anemia     currently taking iron      Arrhythmia     pac, pvc     Back pain     since 1980     Bruit      CAD (coronary artery disease)      Cellulitis 10/18/12     Cellulitis 05/2018    GrpB strep LLE cellulitis  negative RACHAEL for veg     Contact dermatitis and other eczema, due to unspecified cause      Diaphragmatic hernia without mention of obstruction or gangrene      Gastric ulcer      Glucose intolerance (impaired glucose tolerance)      Heart murmur 9/16/13    valvular heart disease     Hyperlipidaemia      Hypertension 8/6/13     Lumbago      Malaise and fatigue      Metabolic syndrome      Mobitz (type) I (Wenckebach's) atrioventricular block     and RBBB     Nocturia 10/18/12     Nocturia      Nonallopathic lesion of cervical region      Nonallopathic lesion of lumbar region      Nonallopathic lesion of pelvic region, not elsewhere classified      Nonallopathic lesion of rib cage      Nonallopathic lesion of sacral region      Paroxysmal atrial fibrillation (H) 10/18/12     Prostate cancer (H) 2008    radiation seed, XRT      PVD (peripheral vascular disease) (H)      Rotator cuff strain     and sprain     S/P aortic valve replacement 2006    developed perivalve leak and MS, therefore redo surg 2013     S/P CABG (coronary artery bypass graft) 2006    Lima-Lad, RA-Rca     Sciatica of left side     since 2000     Sepsis due to group B Streptococcus (H) 5/19/2018     Sleep apnea     pt declined cpap     Ulcerative colitis (H)      Vitamin D deficiency        PAST SURGICAL HISTORY:  Past Surgical History:   Procedure Laterality Date     AORTIC VALVE REPLACEMENT  1/3/06    redo AVR SJM 21mm and SJM MVR 27mm in 2013SJM 21(AGFN [id number]):AVR, SJM 27 :MVR-     ARTHROPLASTY KNEE      right knee     BACK SURGERY  Oct 2015    Fusion L4-5, laminectomy L2, L3     BYPASS GRAFT ARTERY CORONARY  10/2013    reimplantation of radial artery graft to RCA     C CABG, VEIN, TWO  1/3/06    Left radial to RCA, LIMA to LAD (RA to RCA reimplanted at time of 2013 surg)      CARDIAC CATHERIZATION  11/2005    Stent placed to RCA     CARDIAC CATHERIZATION  04/2013    Occluded RCA, patent LIMA to LAD and radial graft to PDA     CARPAL TUNNEL RELEASE RT/LT  1994     COLONOSCOPY  8-22-11     CYSTOSCOPY FLEXIBLE  10/16/2013    Procedure: CYSTOSCOPY FLEXIBLE;  FLEXIBLE CYSTOSCOPY / DILATION OF URETHRA / INSERTION OF LESLIE;  Surgeon: Cooper Wallace MD;  Location: [facility]     ENDOVASCULAR REPAIR ANEURYSM ABDOMINAL AORTA  2006     ENDOVASCULAR REPAIR, INFRARENAL ABDOMINAL AORTIC ANEURYSM/DISSECTION; MODULAR BIFURCATED PROSTHESIS      AAA repair endovascular     ENT SURGERY       GENITOURINARY SURGERY  6/16/08    radioactive seeding     HEAD & NECK SURGERY  1997    vocal cord polypectomy     KNEE SURGERY  2001 Right knee arthroscopy     OPTICAL TRACKING SYSTEM FUSION SPINE POSTERIOR LUMBAR THREE+ LEVELS N/A 10/29/2015    Procedure: OPTICAL TRACKING SYSTEM FUSION SPINE POSTERIOR LUMBAR THREE+ LEVELS;  Surgeon: Walt Garcia MD;  Location:  OR     PROSTATE SURGERY  06/16/2008 Radioactive seeding     PROSTATE SURGERY      radioactive seeding 6/16/08     REPAIR ANEURYSM ABDOMINAL AORTA  06/08     REPAIR VALVE MITRAL  10/16/2013    SJM 21(AGFN [id number]):AVR, SJM 27  501:MVRProcedure: REPAIR VALVE MITRAL;  REDO STERNOTOMY/REDO AORTIC VALVE REPLACEMENT/ MITRAL VALVE REPLACEMENT/REIMPLANTATION OF RIGHT CORONARY ARTERY BYPASS WITH RACHAEL ( ON PUMP);  Surgeon: Viet Singh MD;  Location:  OR     REPLACE VALVE AORTIC  10/16/2013    Procedure: REPLACE VALVE AORTIC;;  Surgeon: Viet Singh MD;  Location:  OR     SURGERY GENERAL IP CONSULT  05/2008 Excision aneurysm abdominal aorta     SURGERY GENERAL IP CONSULT  1997 Vocal cord polypectomy     VASCULAR SURGERY  1968, 1993     varicose vein stripping       FAMILY HISTORY:  Family History   Problem Relation Age of Onset     Coronary Artery Disease Father      CABG     HEART DISEASE Father      Pacemaker     Other Cancer Daughter   "    HEART DISEASE Brother      Other - See Comments Grandchild        SOCIAL HISTORY:  Social History     Social History     Marital status:      Spouse name: N/A     Number of children: N/A     Years of education: N/A     Social History Main Topics     Smoking status: Former Smoker     Packs/day: 1.00     Years: 40.00     Quit date: 10/23/2002     Smokeless tobacco: Never Used     Alcohol use Yes      Comment: a couple beers per week     Drug use: No     Sexual activity: No     Other Topics Concern     Parent/Sibling W/ Cabg, Mi Or Angioplasty Before 65f 55m? Yes     Brother had bypass at 55     Caffeine Concern No     6-8 cups of half and half per day     Special Diet No     Exercise No     Social History Narrative       Review of Systems:  Skin:  Negative       Eyes:  Positive for glasses    ENT:  Positive for hearing loss hearing aids  Respiratory:  Positive for dyspnea on exertion     Cardiovascular:  Negative      Gastroenterology: not assessed      Genitourinary:  not assessed      Musculoskeletal:  Positive for joint pain;back pain;arthritis right leg pain   Neurologic:  Negative      Psychiatric:  Negative      Heme/Lymph/Imm:  Negative      Endocrine:  Negative        Physical Exam:  Vitals: /70 (BP Location: Right arm, Patient Position: Sitting, Cuff Size: Adult Large)  Pulse 55  Ht 1.664 m (5' 5.5\")  Wt 102.5 kg (226 lb)  SpO2 97%  BMI 37.04 kg/m2    Constitutional:  cooperative;in no acute distress        Skin:  warm and dry to the touch;no apparent skin lesions or masses noted          Head:  normocephalic        Eyes:  conjunctivae and lids unremarkable        Lymph:No Cervical lymphadenopathy present     ENT:  no pallor or cyanosis        Neck:  JVP normal;carotid pulses are full and equal bilaterally        Respiratory:  clear to auscultation         Cardiac: regular rhythm     crisp prosthetic valve sounds              1+ 1+         1+ 1+         1+            GI:  abdomen soft " obese      Extremities and Muscular Skeletal:      bilateral LE edema;trace varicose vein varicose vein      Neurological:  no gross motor deficits        Psych:  Alert and Oriented x 3        CC  Jessy Vasquez MD  [id number]  [place] [address]

## 2018-10-01 NOTE — LETTER
10/1/2018    Tu Reyes MD, MD  [address] [place] [address]    RE: Fausto Farr       Dear Colleague,    I had the pleasure of seeing Fausto Farr in the [facility].    HPI and Plan:   See dictation    Orders Placed This Encounter   Procedures     US KEERTHI Doppler Seg Pres w Ex (KEERTHI with Exercise)     US Venous Competency Bilateral       No orders of the defined types were placed in this encounter.      There are no discontinued medications.      Encounter Diagnoses   Name Primary?     PVC's (premature ventricular contractions)      Claudication (H)      Varicose veins of both lower extremities      Varicose veins of both lower extremities with inflammation Yes     S/P AAA repair        CURRENT MEDICATIONS:  Current Outpatient Prescriptions   Medication Sig Dispense Refill     aspirin 81 MG EC tablet Take 1 tablet (81 mg) by mouth daily 1 tablet 0     betamethasone valerate (VALISONE) 0.1 % lotion APPLY TOPICALLY TWICE DAILY PRN 60 mL 3     ezetimibe (ZETIA) 10 MG tablet Take 1 tablet (10 mg) by mouth daily 90 tablet 3     fluocinonide (LIDEX) 0.05 % ointment 2- 30 gram tubes.  Apply twice a day as needed. 60 g 2     furosemide (LASIX) 40 MG tablet Take 0.5 tablets (20 mg) by mouth daily 45 tablet 3     mesalamine (ASACOL HD) 800 MG EC tablet Take 1 tablet (800 mg) by mouth 2 times daily 180 tablet 3     metoprolol tartrate (LOPRESSOR) 25 MG tablet Take 1 tablet (25 mg) by mouth 2 times daily 180 tablet 3     Potassium Chloride ER 20 MEQ TBCR Take 1 tablet (20 mEq) by mouth daily 30 tablet 1     rosuvastatin (CRESTOR) 40 MG tablet Take 1 tablet (40 mg) by mouth daily 90 tablet 3     tamsulosin (FLOMAX) 0.4 MG capsule Take 1 capsule (0.4 mg) by mouth daily 90 capsule 3     warfarin (COUMADIN) 5 MG tablet Take 1 1/2 tablets (7.5 mg) by mouth TWTFSS; 1 tablet (5 mg) on Mondays OR as directed by INR Clinic 135 tablet 3       ALLERGIES     Allergies    Allergen Reactions     Bees Anaphylaxis       PAST MEDICAL HISTORY:  Past Medical History:   Diagnosis Date     Abdominal pain      Abnormal ECG     RBBB, 1st degree AVB, Left axis deviation     Anemia     currently taking iron     Arrhythmia     pac, pvc     Back pain     since 1980     Bruit      CAD (coronary artery disease)      Cellulitis 10/18/12     Cellulitis 05/2018    GrpB strep LLE cellulitis  negative RACHAEL for veg     Contact dermatitis and other eczema, due to unspecified cause      Diaphragmatic hernia without mention of obstruction or gangrene      Gastric ulcer      Glucose intolerance (impaired glucose tolerance)      Heart murmur 9/16/13    valvular heart disease     Hyperlipidaemia      Hypertension 8/6/13     Lumbago      Malaise and fatigue      Metabolic syndrome      Mobitz (type) I (Wenckebach's) atrioventricular block     and RBBB     Nocturia 10/18/12     Nocturia      Nonallopathic lesion of cervical region      Nonallopathic lesion of lumbar region      Nonallopathic lesion of pelvic region, not elsewhere classified      Nonallopathic lesion of rib cage      Nonallopathic lesion of sacral region      Paroxysmal atrial fibrillation (H) 10/18/12     Prostate cancer (H) 2008    radiation seed, XRT      PVD (peripheral vascular disease) (H)      Rotator cuff strain     and sprain     S/P aortic valve replacement 2006    developed perivalve leak and MS, therefore redo surg 2013     S/P CABG (coronary artery bypass graft) 2006    Lima-Lad, RA-Rca     Sciatica of left side     since 2000     Sepsis due to group B Streptococcus (H) 5/19/2018     Sleep apnea     pt declined cpap     Ulcerative colitis (H)      Vitamin D deficiency        PAST SURGICAL HISTORY:  Past Surgical History:   Procedure Laterality Date     AORTIC VALVE REPLACEMENT  1/3/06    redo AVR SJM 21mm and SJM MVR 27mm in 2013SJM 21(AGFN 756):AVR, SJM 27 :MVR-     ARTHROPLASTY KNEE      right knee     BACK SURGERY  Oct 2015     Fusion L4-5, laminectomy L2, L3     BYPASS GRAFT ARTERY CORONARY  10/2013    reimplantation of radial artery graft to RCA     C CABG, VEIN, TWO  1/3/06    Left radial to RCA, LIMA to LAD (RA to RCA reimplanted at time of 2013 surg)     CARDIAC CATHERIZATION  11/2005    Stent placed to RCA     CARDIAC CATHERIZATION  04/2013    Occluded RCA, patent LIMA to LAD and radial graft to PDA     CARPAL TUNNEL RELEASE RT/LT  1994     COLONOSCOPY  8-22-11     CYSTOSCOPY FLEXIBLE  10/16/2013    Procedure: CYSTOSCOPY FLEXIBLE;  FLEXIBLE CYSTOSCOPY / DILATION OF URETHRA / INSERTION OF LESLIE;  Surgeon: Cooper Wallace MD;  Location: [facility] OR     ENDOVASCULAR REPAIR ANEURYSM ABDOMINAL AORTA  2006     ENDOVASCULAR REPAIR, INFRARENAL ABDOMINAL AORTIC ANEURYSM/DISSECTION; MODULAR BIFURCATED PROSTHESIS      AAA repair endovascular     ENT SURGERY       GENITOURINARY SURGERY  6/16/08    radioactive seeding     HEAD & NECK SURGERY  1997    vocal cord polypectomy     KNEE SURGERY  2001 Right knee arthroscopy     OPTICAL TRACKING SYSTEM FUSION SPINE POSTERIOR LUMBAR THREE+ LEVELS N/A 10/29/2015    Procedure: OPTICAL TRACKING SYSTEM FUSION SPINE POSTERIOR LUMBAR THREE+ LEVELS;  Surgeon: Walt Garcia MD;  Location:  OR     PROSTATE SURGERY  06/16/2008 Radioactive seeding     PROSTATE SURGERY      radioactive seeding 6/16/08     REPAIR ANEURYSM ABDOMINAL AORTA  06/08     REPAIR VALVE MITRAL  10/16/2013    SJ 21(AGFN [id number]):AVR, [facility] 27  501:MVRProcedure: REPAIR VALVE MITRAL;  REDO STERNOTOMY/REDO AORTIC VALVE REPLACEMENT/ MITRAL VALVE REPLACEMENT/REIMPLANTATION OF RIGHT CORONARY ARTERY BYPASS WITH RACHAEL ( ON PUMP);  Surgeon: Viet Singh MD;  Location:  OR     REPLACE VALVE AORTIC  10/16/2013    Procedure: REPLACE VALVE AORTIC;;  Surgeon: Viet Singh MD;  Location:  OR     SURGERY GENERAL IP CONSULT  05/2008 Excision aneurysm abdominal aorta     SURGERY GENERAL IP CONSULT  1997 Vocal cord polypectomy      "VASCULAR SURGERY  1968, 1993     varicose vein stripping       FAMILY HISTORY:  Family History   Problem Relation Age of Onset     Coronary Artery Disease Father      CABG     HEART DISEASE Father      Pacemaker     Other Cancer Daughter      HEART DISEASE Brother      Other - See Comments Grandchild        SOCIAL HISTORY:  Social History     Social History     Marital status:      Spouse name: N/A     Number of children: N/A     Years of education: N/A     Social History Main Topics     Smoking status: Former Smoker     Packs/day: 1.00     Years: 40.00     Quit date: 10/23/2002     Smokeless tobacco: Never Used     Alcohol use Yes      Comment: a couple beers per week     Drug use: No     Sexual activity: No     Other Topics Concern     Parent/Sibling W/ Cabg, Mi Or Angioplasty Before 65f 55m? Yes     Brother had bypass at 55     Caffeine Concern No     6-8 cups of half and half per day     Special Diet No     Exercise No     Social History Narrative       Review of Systems:  Skin:  Negative       Eyes:  Positive for glasses    ENT:  Positive for hearing loss hearing aids  Respiratory:  Positive for dyspnea on exertion     Cardiovascular:  Negative      Gastroenterology: not assessed      Genitourinary:  not assessed      Musculoskeletal:  Positive for joint pain;back pain;arthritis right leg pain   Neurologic:  Negative      Psychiatric:  Negative      Heme/Lymph/Imm:  Negative      Endocrine:  Negative        Physical Exam:  Vitals: /70 (BP Location: Right arm, Patient Position: Sitting, Cuff Size: Adult Large)  Pulse 55  Ht 1.664 m (5' 5.5\")  Wt 102.5 kg (226 lb)  SpO2 97%  BMI 37.04 kg/m2    Constitutional:  cooperative;in no acute distress        Skin:  warm and dry to the touch;no apparent skin lesions or masses noted          Head:  normocephalic        Eyes:  conjunctivae and lids unremarkable        Lymph:No Cervical lymphadenopathy present     ENT:  no pallor or cyanosis        Neck:  " JVP normal;carotid pulses are full and equal bilaterally        Respiratory:  clear to auscultation         Cardiac: regular rhythm     crisp prosthetic valve sounds              1+ 1+         1+ 1+         1+            GI:  abdomen soft obese      Extremities and Muscular Skeletal:      bilateral LE edema;trace varicose vein varicose vein      Neurological:  no gross motor deficits        Psych:  Alert and Oriented x 3        CC  Jessy Vasquez MD  [address]  [place] [address]                Thank you for allowing me to participate in the care of your patient.      Sincerely,     Ebenezer Wyatt MD, MD     [facility]    cc:   Jessy Vasquez MD  [address]  [place] [address]

## 2018-10-01 NOTE — MR AVS SNAPSHOT
After Visit Summary   10/1/2018    Fausto Farr    MRN: [id number]           Patient Information     Date Of Birth          1940        Visit Information        Provider Department      10/1/2018 8:30 AM Ebenezer Wyatt MD [facility]        Today's Diagnoses     Varicose veins of both lower extremities with inflammation    -  1    PVC's (premature ventricular contractions)        Claudication (H)        Varicose veins of both lower extremities        Edema            Follow-ups after your visit        Your next 10 appointments already scheduled     Oct 12, 2018  1:00 PM CDT   US LOWER EXTREMITY ARTERIAL DOPPLER SEG PRESSURES W EXER BILAT with RHECHVUS   [facility] ([facility])    [address]  [place] [address] [phone]           How do I prepare for my exam? (Food and drink instructions) No Food and Drink Restrictions.  How do I prepare for my exam? (Other instructions) You do not need to do anything special to prepare for your exam.  What should I wear: Wear comfortable clothes.  How long does the exam take: Most ultrasounds take 30 to 60 minutes.  What should I bring: Bring a list of your medicines, including vitamins, minerals and over-the-counter drugs. It is safest to leave personal items at home.  Do I need a :  No  is needed.  What do I need to tell my doctor: Tell your doctor about any allergies you may have.  What should I do after the exam: No restrictions, You may resume normal activities.  What is this test: An ultrasound uses sound waves to make pictures of the body. Sound waves do not cause pain. The only discomfort may be the pressure of the wand against your skin or full bladder.  Who should I call with questions: If you have any questions, please call the Imaging Department where you will have your exam. Directions, parking instructions, and  other information is available on our website, [email or web address].            Oct 12, 2018  2:00 PM CDT   US LOWER EXTREMITY VENOUS COMPETENCY BILATERAL with CAMILO   [facility] ([facility])    [address]  [facility] [address] [phone]           How do I prepare for my exam? (Food and drink instructions) No Food and Drink Restrictions.  How do I prepare for my exam? (Other instructions) You do not need to do anything special to prepare for your exam.  What should I wear: Wear comfortable clothes.  How long does the exam take: Most ultrasounds take 30 to 60 minutes.  What should I bring: Bring a list of your medicines, including vitamins, minerals and over-the-counter drugs. It is safest to leave personal items at home.  Do I need a :  No  is needed.  What do I need to tell my doctor: Tell your doctor about any allergies you may have.  What should I do after the exam: No restrictions, You may resume normal activities.  What is this test: An ultrasound uses sound waves to make pictures of the body. Sound waves do not cause pain. The only discomfort may be the pressure of the wand against your skin or full bladder.  Who should I call with questions: If you have any questions, please call the Imaging Department where you will have your exam. Directions, parking instructions, and other information is available on our website, [email or web address].            Oct 15, 2018  8:45 AM CDT   Anticoagulation Visit with EA ANTICOAGULATION CLINIC   [facility] ([facility])    [address]  [address]  [facility] [address] [phone]            Oct 19, 2018  9:00 AM CDT   Return Sleep Patient with Isrrael Dobbins MD   [facility] ([facility])    [address]  [facility] [id number] [phone]              Future tests that were  "ordered for you today     Open Future Orders        Priority Expected Expires Ordered    US Venous Competency Bilateral Routine 10/31/2018 10/1/2019 10/1/2018    US KEERTHI Doppler Seg Pres w Ex (KEERTHI with Exercise) Routine 10/8/2018 10/1/2019 10/1/2018            Who to contact     If you have questions or need follow up information about today's clinic visit or your schedule please contact [facility] directly at [phone].  Normal or non-critical lab and imaging results will be communicated to you by MyChart, letter or phone within 4 business days after the clinic has received the results. If you do not hear from us within 7 days, please contact the clinic through MyChart or phone. If you have a critical or abnormal lab result, we will notify you by phone as soon as possible.  Submit refill requests through [employer] or call your pharmacy and they will forward the refill request to us. Please allow 3 business days for your refill to be completed.          Additional Information About Your Visit        Care EveryWhere ID     This is your Care EveryWhere ID. This could be used by other organizations to access your [facility] medical records  [id number]        Your Vitals Were     Pulse Height Pulse Oximetry BMI (Body Mass Index)          55 1.664 m (5' 5.5\") 97% 37.04 kg/m2         Blood Pressure from Last 3 Encounters:   10/01/18 108/70   09/19/18 138/72   07/23/18 132/78    Weight from Last 3 Encounters:   10/01/18 102.5 kg (226 lb)   09/19/18 101.6 kg (224 lb)   07/23/18 100.2 kg (221 lb)              We Performed the Following     Follow-Up with Cardiologist        Primary Care Provider Office Phone # Fax #    Tu Reyes [phone] [address] [place] [address]        Equal Access to Services     JOHN MARTINS : Josi Wu, waaxda emelyqmildred, qaybta kaalcora rm, denny blake " ah. So [facility] [phone].    ATENCIÓN: Si carlyn james, tiene a brown disposición servicios gratuitos de asistencia lingüística. Rebecca zurita [phone].    We comply with applicable federal civil rights laws and [place] laws. We do not discriminate on the basis of race, color, national origin, age, disability, sex, sexual orientation, or gender identity.            Thank you!     Thank you for choosing [facility]  for your care. Our goal is always to provide you with excellent care. Hearing back from our patients is one way we can continue to improve our services. Please take a few minutes to complete the written survey that you may receive in the mail after your visit with us. Thank you!             Your Updated Medication List - Protect others around you: Learn how to safely use, store and throw away your medicines at www.disposemymeds.org.          This list is accurate as of 10/1/18  9:10 AM.  Always use your most recent med list.                   Brand Name Dispense Instructions for use Diagnosis    aspirin 81 MG EC tablet     1 tablet    Take 1 tablet (81 mg) by mouth daily        betamethasone valerate 0.1 % lotion    VALISONE    60 mL    APPLY TOPICALLY TWICE DAILY PRN    Eczema, unspecified type       ezetimibe 10 MG tablet    ZETIA    90 tablet    Take 1 tablet (10 mg) by mouth daily    Mixed hyperlipidemia       fluocinonide 0.05 % ointment    LIDEX    60 g    2- 30 gram tubes.  Apply twice a day as needed.    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)       metoprolol tartrate 25 MG tablet    LOPRESSOR    180 tablet    Take 1 tablet (25 mg) by mouth 2 times daily    Coronary artery  disease involving coronary bypass graft 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       Potassium Chloride ER 20 MEQ Tbcr     30 tablet    Take 1 tablet (20 mEq) by mouth daily    Vitamin D deficiency, Left leg cellulitis, Essential hypertension, benign, Atrial fibrillation, unspecified type (H)       rosuvastatin 40 MG tablet    CRESTOR    90 tablet    Take 1 tablet (40 mg) by mouth daily    Hyperlipidemia, unspecified hyperlipidemia type       tamsulosin 0.4 MG capsule    FLOMAX    90 capsule    Take 1 capsule (0.4 mg) by mouth daily    Urinary retention       warfarin 5 MG tablet    COUMADIN    135 tablet    Take 1 1/2 tablets (7.5 mg) by mouth TWTFSS; 1 tablet (5 mg) on Mondays OR as directed by INR Clinic    Long-term (current) use of anticoagulants, S/P aortic valve replacement

## 2018-10-01 NOTE — LETTER
10/1/2018      Tu Reyes MD, MD  [address] [place] [address]      RE: Fausto Carson Yordan       Dear Colleague,    I had the pleasure of seeing Fausto Farr in the [facility].    Service Date: 10/01/2018      REASON FOR CONSULTATION:  Lower extremity weakness and discomfort with exertion      HISTORY OF PRESENT ILLNESS:  I had the pleasure of seeing Fausto Farr at the [facility] in [place] this morning.  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      As far as I can tell, he has never been diagnosed with significant lower extremity arterial insufficiency.  He did undergo endovascular AAA repair by Dr. Mays from Vascular Surgery as well as IR in 2010.  He has been followed by Vascular Surgery and IR for that.  His most recent vascular evaluation was aortoiliac ultrasound performed on 07/10/2018.  That ultrasound was read as showing patent [id number] stent graft with aneurysmal sac size of 7 cm AP x 6 cm transverse, previously 5.8 cm AP x 6.4 cm transverse in 2015.  There is aneurysmal dilatation of the left common iliac artery measuring up to 2.4 cm which has increased from previous ultrasound.  As far as I can see, there has not been lower extremity KEERTHI or imaging of the patient's lower extremity arteries.      He does report exertional lower back pain radiating to his hips.  The discomfort starts in the coccyx and buttocks area.  It is usually predictable and is brought on by exertion.  The symptoms are usually better if he is leaning forward, especially if he  is supported by a shopping cart.  He can walk with a shopping cart leaning forward in the store without any difficulty.  He denies any rest pain, although he has had some right above-the-knee discomfort at the area where he has a lot of varicose veins.  Denies prior lower extremity ulcerations or tissue breakdown.      IMPRESSION, REPORT AND PLAN:   1.  Lower back pain as well as bilateral lower extremity discomfort with walking.   2.  Chronic lower extremity venous insufficiency as well as varicose veins status post prior venous stripping.   3.  History of AAA endovascular repair in .   4.  Right common iliac artery aneurysm, followed by Vascular Surgery/IR.   5.  History of valvular heart disease status post mechanical AVR and MVR.      The patient's lower extremity symptoms are quite atypical for arterial insufficiency.  His symptoms are suggestive of neurogenic claudication but he tells me he was recently seen by his spine surgeon who performed imaging.  He was told there was no structural abnormality in his lower back.      On physical examination, his pedal pulses as well as femoral pulses are palpable but somewhat diminished.  We will obtain KEERTHI with exercise to assess possible underlying arterial insufficiency.  If the ABIs are unremarkable, then I would recommend that he go back to ortho/spine for further evaluation.      His recent aortoiliac ultrasound suggested progression of his right common iliac artery aneurysm.  He will follow closely with IR/Vascular Surgery with regards to this.      It was a pleasure seeing Mr. Vaughn in the clinic this morning.  We will contact him once the results of the KEERTHI become available.      I appreciate the opportunity to be part of this patient's care.         ROSIE SIMON MD             D: 10/01/2018   T: 10/01/2018   MT: GUERO      Name:     LIANG VAUGHN   MRN:      [id number]        Account:      [id number]   :      1940           Service Date:  10/01/2018      Document: [id number]         Outpatient Encounter Prescriptions as of 10/1/2018   Medication Sig Dispense Refill     aspirin 81 MG EC tablet Take 1 tablet (81 mg) by mouth daily 1 tablet 0     betamethasone valerate (VALISONE) 0.1 % lotion APPLY TOPICALLY TWICE DAILY PRN 60 mL 3     ezetimibe (ZETIA) 10 MG tablet Take 1 tablet (10 mg) by mouth daily 90 tablet 3     fluocinonide (LIDEX) 0.05 % ointment 2- 30 gram tubes.  Apply twice a day as needed. 60 g 2     furosemide (LASIX) 40 MG tablet Take 0.5 tablets (20 mg) by mouth daily 45 tablet 3     mesalamine (ASACOL HD) 800 MG EC tablet Take 1 tablet (800 mg) by mouth 2 times daily 180 tablet 3     metoprolol tartrate (LOPRESSOR) 25 MG tablet Take 1 tablet (25 mg) by mouth 2 times daily 180 tablet 3     Potassium Chloride ER 20 MEQ TBCR Take 1 tablet (20 mEq) by mouth daily 30 tablet 1     rosuvastatin (CRESTOR) 40 MG tablet Take 1 tablet (40 mg) by mouth daily 90 tablet 3     tamsulosin (FLOMAX) 0.4 MG capsule Take 1 capsule (0.4 mg) by mouth daily 90 capsule 3     warfarin (COUMADIN) 5 MG tablet Take 1 1/2 tablets (7.5 mg) by mouth TWTFSS; 1 tablet (5 mg) on Mondays OR as directed by INR Clinic 135 tablet 3     No facility-administered encounter medications on file as of 10/1/2018.        Again, thank you for allowing me to participate in the care of your patient.      Sincerely,    Ebenezer Wyatt MD, MD     [facility]

## 2018-10-01 NOTE — PROGRESS NOTES
Service Date: 10/01/2018      REASON FOR CONSULTATION:  Lower extremity weakness and discomfort with exertion      HISTORY OF PRESENT ILLNESS:  I had the pleasure of seeing Fausto Farr at the [facility] in [place] this morning.  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      As far as I can tell, he has never been diagnosed with significant lower extremity arterial insufficiency.  He did undergo endovascular AAA repair by Dr. Mays from Vascular Surgery as well as IR in 2010.  He has been followed by Vascular Surgery and IR for that.  His most recent vascular evaluation was aortoiliac ultrasound performed on 07/10/2018.  That ultrasound was read as showing patent [id number] stent graft with aneurysmal sac size of 7 cm AP x 6 cm transverse, previously 5.8 cm AP x 6.4 cm transverse in 2015.  There is aneurysmal dilatation of the left common iliac artery measuring up to 2.4 cm which has increased from previous ultrasound.  As far as I can see, there has not been lower extremity KEERTHI or imaging of the patient's lower extremity arteries.      He does report exertional lower back pain radiating to his hips.  The discomfort starts in the coccyx and buttocks area.  It is usually predictable and is brought on by exertion.  The symptoms are usually better if he is leaning forward, especially if he is supported by a shopping cart.  He can walk with a shopping cart leaning forward in the store without any difficulty.  He denies any rest pain, although he has had some right above-the-knee discomfort at the area where he has a lot of  varicose veins.  Denies prior lower extremity ulcerations or tissue breakdown.      IMPRESSION, REPORT AND PLAN:   1.  Lower back pain as well as bilateral lower extremity discomfort with walking.   2.  Chronic lower extremity venous insufficiency as well as varicose veins status post prior venous stripping.   3.  History of AAA endovascular repair in .   4.  Right common iliac artery aneurysm, followed by Vascular Surgery/IR.   5.  History of valvular heart disease status post mechanical AVR and MVR.      The patient's lower extremity symptoms are quite atypical for arterial insufficiency.  His symptoms are suggestive of neurogenic claudication but he tells me he was recently seen by his spine surgeon who performed imaging.  He was told there was no structural abnormality in his lower back.      On physical examination, his pedal pulses as well as femoral pulses are palpable but somewhat diminished.  We will obtain KEERTHI with exercise to assess possible underlying arterial insufficiency.  If the ABIs are unremarkable, then I would recommend that he go back to ortho/spine for further evaluation.      His recent aortoiliac ultrasound suggested progression of his right common iliac artery aneurysm.  He will follow closely with IR/Vascular Surgery with regards to this.      It was a pleasure seeing Mr. Vaughn in the clinic this morning.  We will contact him once the results of the KEERTHI become available.      I appreciate the opportunity to be part of this patient's care.         ROSIE SIMON MD             D: 10/01/2018   T: 10/01/2018   MT: GUERO      Name:     LIANG VAUGHN   MRN:      [id number]        Account:      [id number]   :      1940           Service Date: 10/01/2018      Document: [id number]

## 2018-10-12 ENCOUNTER — HOSPITAL ENCOUNTER (OUTPATIENT)
Dept: CARDIOLOGY | Facility: CLINIC | Age: 78
Discharge: HOME | End: 2018-10-12
Attending: INTERNAL MEDICINE | Admitting: INTERNAL MEDICINE
Payer: MEDICARE

## 2018-10-12 DIAGNOSIS — I83.12 VARICOSE VEINS OF BOTH LOWER EXTREMITIES WITH INFLAMMATION: ICD-10-CM

## 2018-10-12 DIAGNOSIS — I83.11 VARICOSE VEINS OF BOTH LOWER EXTREMITIES WITH INFLAMMATION: ICD-10-CM

## 2018-10-12 DIAGNOSIS — I73.9 CLAUDICATION (H): ICD-10-CM

## 2018-10-12 PROCEDURE — 93924 LWR XTR VASC STDY BILAT: CPT | Mod: 26 | Performed by: INTERNAL MEDICINE

## 2018-10-12 PROCEDURE — 93970 EXTREMITY STUDY: CPT | Mod: 26 | Performed by: INTERNAL MEDICINE

## 2018-10-12 PROCEDURE — 93970 EXTREMITY STUDY: CPT

## 2018-10-12 PROCEDURE — 93924 LWR XTR VASC STDY BILAT: CPT

## 2018-10-15 ENCOUNTER — ALLIED HEALTH/NURSE VISIT (OUTPATIENT)
Dept: NURSING | Facility: CLINIC | Age: 78
End: 2018-10-15
Payer: MEDICARE

## 2018-10-15 ENCOUNTER — ANTICOAGULATION THERAPY VISIT (OUTPATIENT)
Dept: NURSING | Facility: CLINIC | Age: 78
End: 2018-10-15
Payer: MEDICARE

## 2018-10-15 VITALS — HEART RATE: 66 BPM | DIASTOLIC BLOOD PRESSURE: 59 MMHG | SYSTOLIC BLOOD PRESSURE: 128 MMHG

## 2018-10-15 DIAGNOSIS — Z79.01 LONG TERM CURRENT USE OF ANTICOAGULANT THERAPY: Primary | ICD-10-CM

## 2018-10-15 DIAGNOSIS — Z23 NEED FOR PROPHYLACTIC VACCINATION AND INOCULATION AGAINST INFLUENZA: Primary | ICD-10-CM

## 2018-10-15 DIAGNOSIS — I48.91 AF (ATRIAL FIBRILLATION) (H): ICD-10-CM

## 2018-10-15 DIAGNOSIS — Z95.2 S/P MITRAL VALVE REPLACEMENT: ICD-10-CM

## 2018-10-15 LAB — INR POINT OF CARE: 4.4 (ref 0.86–1.14)

## 2018-10-15 PROCEDURE — 85610 PROTHROMBIN TIME: CPT | Mod: QW

## 2018-10-15 PROCEDURE — G0008 ADMIN INFLUENZA VIRUS VAC: HCPCS

## 2018-10-15 PROCEDURE — 36416 COLLJ CAPILLARY BLOOD SPEC: CPT

## 2018-10-15 PROCEDURE — 99207 ZZC NO CHARGE NURSE ONLY: CPT

## 2018-10-15 PROCEDURE — 90662 IIV NO PRSV INCREASED AG IM: CPT

## 2018-10-15 NOTE — MR AVS SNAPSHOT
After Visit Summary   10/15/2018    Fausto Farr    MRN: [id number]           Patient Information     Date Of Birth          1940        Visit Information        Provider Department      10/15/2018 9:00 AM [facility]        Today's Diagnoses     Need for prophylactic vaccination and inoculation against influenza    -  1       Follow-ups after your visit        Your next 10 appointments already scheduled     Oct 19, 2018  9:00 AM CDT   Return Sleep Patient with Isrrael Dobbins MD   [facility] ([facility])    [address]  [place] [address] [phone]            Oct 29, 2018  9:30 AM CDT   Anticoagulation Visit with EA ANTICOAGULATION CLINIC   [facility] ([facility])    [address]  [address]  [facility] [address] [phone]              Who to contact     If you have questions or need follow up information about today's clinic visit or your schedule please contact [facility] directly at [phone].  Normal or non-critical lab and imaging results will be communicated to you by MyChart, letter or phone within 4 business days after the clinic has received the results. If you do not hear from us within 7 days, please contact the clinic through MyChart or phone. If you have a critical or abnormal lab result, we will notify you by phone as soon as possible.  Submit refill requests through [email or web address] or call your pharmacy and they will forward the refill request to us. Please allow 3 business days for your refill to be completed.          Additional Information About Your Visit        Care EveryWhere ID     This is your Care EveryWhere ID. This could be used by other organizations to access your [facility] medical records  [id number]        Your Vitals Were     Pulse                   66            Blood Pressure from Last 3 Encounters:   10/15/18  128/59   10/01/18 108/70   09/19/18 138/72    Weight from Last 3 Encounters:   10/01/18 226 lb (102.5 kg)   09/19/18 224 lb (101.6 kg)   07/23/18 221 lb (100.2 kg)              We Performed the Following     ADMIN INFLUENZA (For MEDICARE Patients ONLY) []     FLU VACCINE, INCREASED ANTIGEN, PRESV FREE, AGE 65+ [[address]]        Primary Care Provider Office Phone # Fax #    Tu Reyes [phone] [address] [place] [address]        Equal Access to Services     [facility]: Hadii ted garsia hadgissel Sobridger, waaxda john, qaybta kaalmarenetta rm, denny blake . So [facility] [phone].    ATENCIÓN: Si habla español, tiene a brown disposición servicios gratuitos de asistencia lingüística. [facility] [phone].    We comply with applicable federal civil rights laws and [place] laws. We do not discriminate on the basis of race, color, national origin, age, disability, sex, sexual orientation, or gender identity.            Thank you!     Thank you for choosing [facility]  for your care. Our goal is always to provide you with excellent care. Hearing back from our patients is one way we can continue to improve our services. Please take a few minutes to complete the written survey that you may receive in the mail after your visit with us. Thank you!             Your Updated Medication List - Protect others around you: Learn how to safely use, store and throw away your medicines at www.disposemymeds.org.          This list is accurate as of 10/15/18  9:22 AM.  Always use your most recent med list.                   Brand Name Dispense Instructions for use Diagnosis    aspirin 81 MG EC tablet     1 tablet    Take 1 tablet (81 mg) by mouth daily        betamethasone valerate 0.1 % lotion    VALISONE    60 mL    APPLY TOPICALLY TWICE DAILY PRN    Eczema, unspecified type       ezetimibe 10 MG tablet    ZETIA    90 tablet    Take 1 tablet  (10 mg) by mouth daily    Mixed hyperlipidemia       fluocinonide 0.05 % ointment    LIDEX    60 g    2- 30 gram tubes.  Apply twice a day as needed.    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)       metoprolol tartrate 25 MG tablet    LOPRESSOR    180 tablet    Take 1 tablet (25 mg) by mouth 2 times daily    Coronary artery disease involving coronary bypass graft 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       Potassium Chloride ER 20 MEQ Tbcr     30 tablet    Take 1 tablet (20 mEq) by mouth daily    Vitamin D deficiency, Left leg cellulitis, Essential hypertension, benign, Atrial fibrillation, unspecified type (H)       rosuvastatin 40 MG tablet    CRESTOR    90 tablet    Take 1 tablet (40 mg) by mouth daily    Hyperlipidemia, unspecified hyperlipidemia type       tamsulosin 0.4 MG capsule    FLOMAX    90 capsule    Take 1 capsule (0.4 mg) by mouth daily    Urinary retention       warfarin 5 MG tablet    COUMADIN    135 tablet    Take 1 1/2 tablets (7.5 mg) by mouth TWTFSS; 1 tablet (5 mg) on Mondays OR as directed by INR Clinic    Long-term (current) use of anticoagulants, S/P aortic valve replacement

## 2018-10-15 NOTE — PROGRESS NOTES
Injectable Influenza Immunization Documentation    1.  Is the person to be vaccinated sick today?   No    2. Does the person to be vaccinated have an allergy to a component   of the vaccine?   No  Egg Allergy Algorithm Link    3. Has the person to be vaccinated ever had a serious reaction   to influenza vaccine in the past?   No    4. Has the person to be vaccinated ever had Guillain-Barré syndrome?   No    Form completed by MIGUELANGEL Man    Fausto Farr is a 78 year old patient who comes in today for a Blood Pressure check.  Initial BP:  There were no vitals taken for this visit.     Data Unavailable  Disposition: follow-up as previously indicated by provider  MIGUELANGEL Man

## 2018-10-15 NOTE — MR AVS SNAPSHOT
Fausto Farr   10/15/2018 8:45 AM   Anticoagulation Therapy Visit    Description:  78 year old male   Provider:   ANTICOAGULATION CLINIC   Department:   Nurse           INR as of 10/15/2018     Today's INR 4.4!      Anticoagulation Summary as of 10/15/2018     INR goal 2.5-3.5   Today's INR 4.4!   Full warfarin instructions 10/15: 2.5 mg; Otherwise 5 mg on Mon, Wed, Fri; 7.5 mg all other days   Next INR check 10/29/2018    Indications   AF (atrial fibrillation) (H) [I48.91]  S/P mitral valve replacement [Z95.2]  Long-term (current) use of anticoagulants [Z79.01] [Z79.01]         Your next Anticoagulation Clinic appointment(s)     Oct 15, 2018  8:45 AM CDT   Anticoagulation Visit with  ANTICOAGULATION CLINIC   [facility] ([facility])    [address] [place] [address]  [address]  [facility] [address] [phone]            Oct 29, 2018  9:30 AM CDT   Anticoagulation Visit with  ANTICOAGULATION CLINIC   [facility] ([facility])    [address] [place] [address]  [address]  [facility] [id number] [phone]              Contact Numbers     [facility]  Please call  [phone] to cancel and/or reschedule your appointment   Please call  [phone] with any problems or questions regarding your therapy.        October 2018 Details    Sun Mon Tue Wed Thu Fri Sat      1               2               3               4               5               6                 7               8               9               10               11               12               13                 14               15      2.5 mg   See details      16      7.5 mg         17      5 mg         18      7.5 mg         19      5 mg         20      7.5 mg           21      7.5 mg         22      5 mg         23      7.5 mg         24      5 mg         25      7.5 mg         26      5 mg         27      7.5 mg           28      7.5 mg         29            30               31                    Date Details   10/15 This INR check       Date of next INR:  10/29/2018         How to take your warfarin dose     To take:  2.5 mg Take 0.5 of a 5 mg tablet.    To take:  5 mg Take 1 of the 5 mg tablets.    To take:  7.5 mg Take 1.5 of the 5 mg tablets.

## 2018-10-16 NOTE — PROGRESS NOTES
ANTICOAGULATION FOLLOW-UP CLINIC VISIT    Patient Name:  Fausto Farr  Date:  10/16/2018  Contact Type:  Face to Face  Unable to find the cause for the abnormal INR result. Denies change in diet, illness, inflammation, bleeding/bruising or any alcohol intake. His INR today is 4.4. Denies missed dose.     Pt started taking otc herbal powder 1.5 tsp(white willow bark, turmeric, kulwant & yucca) mixed in cranberry juice every day for 3 weeks now. He is planning to consume this herbal product for a long a time, so requesting us to dose his coumadin accordingly.     Added supplement to his med list. Decreased total of 5 mg from his maintenance weekly dosing. Also decreased his dose for today to 2.5 mg, continue rest of the modified maintenance dosing & recheck INR in 2 weeks. Advised him to notify us when he decides to discontinue this supplement. Pt agrees.     As per micromedex: Severity: Moderate  Concurrent use of WARFARIN and WILLOW may result in increased anticoagulant effectiveness.  Concurrent use of KULWANT and ANTICOAGULANTS may result in increased risk of bleeding.     SUBJECTIVE:     Patient Findings     Positives Herbal/Supplements    Comments Denies bleeding/bruising or missed dose.             OBJECTIVE    INR Protime   Date Value Ref Range Status   10/15/2018 4.4 (A) 0.86 - 1.14 Final       ASSESSMENT / PLAN  INR assessment SUPRA    Recheck INR In: 2 WEEKS    INR Location Clinic      Anticoagulation Summary as of 10/15/2018     INR goal 2.5-3.5   Today's INR 4.4!   Warfarin maintenance plan 5 mg (5 mg x 1) on Mon, Wed, Fri; 7.5 mg (5 mg x 1.5) all other days   Full warfarin instructions 10/15: 2.5 mg; Otherwise 5 mg on Mon, Wed, Fri; 7.5 mg all other days   Weekly warfarin total 45 mg   Plan last modified Chayito Sanders RN (10/15/2018)   Next INR check 10/29/2018   Priority INR   Target end date Indefinite    Indications   AF (atrial fibrillation) (H) [I48.91]  S/P mitral valve replacement  [Z95.2]  Long term current use of anticoagulant therapy [Z79.01]         Anticoagulation Episode Summary     INR check location     Preferred lab     Send INR reminders to [facility]    Comments 5mg tabs - andrade dose // transfer from [facility] // [facility] // CALENDAR      Anticoagulation Care Providers     Provider Role Specialty Phone number    Tu Reyes MD  Internal Medicine [phone]            See the Encounter Report to view Anticoagulation Flowsheet and Dosing Calendar (Go to Encounters tab in chart review, and find the Anticoagulation Therapy Visit)    Chayito Sanders RN

## 2018-10-18 ENCOUNTER — TELEPHONE (OUTPATIENT)
Dept: CARDIOLOGY | Facility: CLINIC | Age: 78
End: 2018-10-18

## 2018-10-18 DIAGNOSIS — I83.93 VARICOSE VEINS OF BOTH LOWER EXTREMITIES, UNSPECIFIED WHETHER COMPLICATED: Primary | ICD-10-CM

## 2018-10-18 NOTE — TELEPHONE ENCOUNTER
Notes Recorded by Ebenezer Wyatt MD on 10/18/2018 at 1:38 PM  Abnormal venous study. Please have pt see Fatimah Snyder NP to discuss potential venous intervention    Notes Recorded by Ebenezer Wyatt MD on 10/18/2018 at 1:35 PM  Normal ABIs    Order placed for follow up with vein clinic [facility]. Contacted patient to review results and Dr. Wyatt's comments and recommendations. Patient's wife answered, patient unavailable right now. Provided Team 4 direct phone number for patient to call back once he is available.

## 2018-10-18 NOTE — TELEPHONE ENCOUNTER
Spoke with patient about results and Dr. Wyatt's recommendations. Patient is agreeable to meeting with [facility] to discuss further treatment. Patient connected with scheduling to set up OV with vein clinic [facility] to discuss.

## 2018-10-19 ENCOUNTER — OFFICE VISIT (OUTPATIENT)
Dept: SLEEP MEDICINE | Facility: CLINIC | Age: 78
End: 2018-10-19
Payer: MEDICARE

## 2018-10-19 VITALS
WEIGHT: 226 LBS | BODY MASS INDEX: 36.32 KG/M2 | RESPIRATION RATE: 15 BRPM | OXYGEN SATURATION: 97 % | HEIGHT: 66 IN | DIASTOLIC BLOOD PRESSURE: 56 MMHG | HEART RATE: 59 BPM | SYSTOLIC BLOOD PRESSURE: 115 MMHG

## 2018-10-19 DIAGNOSIS — G47.33 OSA (OBSTRUCTIVE SLEEP APNEA): Primary | ICD-10-CM

## 2018-10-19 PROCEDURE — 99214 OFFICE O/P EST MOD 30 MIN: CPT | Performed by: INTERNAL MEDICINE

## 2018-10-19 NOTE — MR AVS SNAPSHOT
After Visit Summary   10/19/2018    Fausto Farr    MRN: [id number]           Patient Information     Date Of Birth          1940        Visit Information        Provider Department      10/19/2018 9:00 AM Isrrael Dobbins MD [facility]        Today's Diagnoses     GAGE (obstructive sleep apnea)    -  1      Care Instructions          Your BMI is Body mass index is 37.04 kg/(m^2).  Weight management is a personal decision.  If you are interested in exploring weight loss strategies, the following discussion covers the approaches that may be successful. Body mass index (BMI) is one way to tell whether you are at a healthy weight, overweight, or obese. It measures your weight in relation to your height.  A BMI of 18.5 to 24.9 is in the healthy range. A person with a BMI of 25 to 29.9 is considered overweight, and someone with a BMI of 30 or greater is considered obese. More than two-thirds of American adults are considered overweight or obese.  Being overweight or obese increases the risk for further weight gain. Excess weight may lead to heart disease and diabetes.  Creating and following plans for healthy eating and physical activity may help you improve your health.  Weight control is part of healthy lifestyle and includes exercise, emotional health, and healthy eating habits. Careful eating habits lifelong are the mainstay of weight control. Though there are significant health benefits from weight loss, long-term weight loss with diet alone may be very difficult to achieve- studies show long-term success with dietary management in less than 10% of people. Attaining a healthy weight may be especially difficult to achieve in those with severe obesity. In some cases, medications, devices and surgical management might be considered.  What can you do?  If you are overweight or obese and are interested in methods for weight loss, you should discuss this with your provider.      Consider reducing daily calorie intake by 500 calories.     Keep a food journal.     Avoiding skipping meals, consider cutting portions instead.    Diet combined with exercise helps maintain muscle while optimizing fat loss. Strength training is particularly important for building and maintaining muscle mass. Exercise helps reduce stress, increase energy, and improves fitness. Increasing exercise without diet control, however, may not burn enough calories to loose weight.       Start walking three days a week 10-20 minutes at a time    Work towards walking thirty minutes five days a week     Eventually, increase the speed of your walking for 1-2 minutes at time    In addition, we recommend that you review healthy lifestyles and methods for weight loss available through the National Institutes of Health patient information sites:  http://win.niddk.nih.gov/publications/index.htm    And look into health and wellness programs that may be available through your health insurance provider, employer, local community center, or dominick club.    Weight management plan: Patient was referred to their PCP to discuss a diet and exercise plan.     Your blood pressure was checked while you were in clinic today.  Please read the guidelines below about what these numbers mean and what you should do about them.  Your systolic blood pressure is the top number.  This is the pressure when the heart is pumping.  Your diastolic blood pressure is the bottom number.  This is the pressure in between beats.  If your systolic blood pressure is less than 120 and your diastolic blood pressure is less than 80, then your blood pressure is normal. There is nothing more that you need to do about it  If your systolic blood pressure is 120-139 or your diastolic blood pressure is 80-89, your blood pressure may be higher than it should be.  You should have your blood pressure re-checked within a year by a primary care provider.  If your systolic blood  pressure is 140 or greater or your diastolic blood pressure is 90 or greater, you may have high blood pressure.  High blood pressure is treatable, but if left untreated over time it can put you at risk for heart attack, stroke, or kidney failure.  You should have your blood pressure re-checked by a primary care provider within the next four weeks.              Follow-ups after your visit        Follow-up notes from your care team     Return in about 1 year (around 10/19/2019).      Your next 10 appointments already scheduled     Oct 29, 2018  9:30 AM CDT   Anticoagulation Visit with  ANTICOAGULATION CLINIC   [facility] ([facility])    [address]  [address]  [facility] [address] [phone]            Nov 01, 2018  3:10 PM CDT   Return Visit with Warren Scales PA-C   [facility] ([facility])    [address]  [facility] [id number] [phone]              Future tests that were ordered for you today     Open Future Orders        Priority Expected Expires Ordered    Follow-Up with Cardiac Advanced Practice Provider Routine 10/25/2018 10/18/2019 10/18/2018            Who to contact     If you have questions or need follow up information about today's clinic visit or your schedule please contact [facility] directly at [phone].  Normal or non-critical lab and imaging results will be communicated to you by MyChart, letter or phone within 4 business days after the clinic has received the results. If you do not hear from us within 7 days, please contact the clinic through MyChart or phone. If you have a critical or abnormal lab result, we will notify you by phone as soon as possible.  Submit refill requests through [employer] or call your pharmacy and they will forward the refill request to us. Please allow 3 business days for your refill to be completed.          Additional  "Information About Your Visit        Care EveryWhere ID     This is your Care EveryWhere ID. This could be used by other organizations to access your [facility] medical records  [id number]        Your Vitals Were     Pulse Respirations Height Pulse Oximetry BMI (Body Mass Index)       59 15 1.664 m (5' 5.5\") 97% 37.04 kg/m2        Blood Pressure from Last 3 Encounters:   10/19/18 115/56   10/15/18 128/59   10/01/18 108/70    Weight from Last 3 Encounters:   10/19/18 102.5 kg (226 lb)   10/01/18 102.5 kg (226 lb)   09/19/18 101.6 kg (224 lb)              Today, you had the following     No orders found for display       Primary Care Provider Office Phone # Fax #    Tu Reyes [phone] [address] [place] [address]        Equal Access to Services     : Hadii aad ku hadasho Soomaali, waaxda luqadaha, qaybta kaalmada adeegyada, waxay silvinoin hayelisabethn moira blake . So [facility] [phone].    ATENCIÓN: Si habla español, tiene a brown disposición servicios gratuitos de asistencia lingüística. [facility] [phone].    We comply with applicable federal civil rights laws and [place] laws. We do not discriminate on the basis of race, color, national origin, age, disability, sex, sexual orientation, or gender identity.            Thank you!     Thank you for choosing [facility]  for your care. Our goal is always to provide you with excellent care. Hearing back from our patients is one way we can continue to improve our services. Please take a few minutes to complete the written survey that you may receive in the mail after your visit with us. Thank you!             Your Updated Medication List - Protect others around you: Learn how to safely use, store and throw away your medicines at www.disposemymeds.org.          This list is accurate as of 10/19/18 10:41 AM.  Always use your most recent med list.                   Brand Name Dispense " Instructions for use Diagnosis    aspirin 81 MG EC tablet     1 tablet    Take 1 tablet (81 mg) by mouth daily        betamethasone valerate 0.1 % lotion    VALISONE    60 mL    APPLY TOPICALLY TWICE DAILY PRN    Eczema, unspecified type       ezetimibe 10 MG tablet    ZETIA    90 tablet    Take 1 tablet (10 mg) by mouth daily    Mixed hyperlipidemia       fluocinonide 0.05 % ointment    LIDEX    60 g    2- 30 gram tubes.  Apply twice a day as needed.    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       Potassium Chloride ER 20 MEQ Tbcr     30 tablet    Take 1 tablet (20 mEq) by mouth daily    Vitamin D deficiency, Left leg cellulitis, Essential hypertension, benign, Atrial fibrillation, unspecified type (H)       rosuvastatin 40 MG tablet    CRESTOR    90 tablet    Take 1 tablet (40 mg) by mouth daily    Hyperlipidemia, unspecified hyperlipidemia type       tamsulosin 0.4 MG capsule    FLOMAX    90 capsule    Take 1 capsule (0.4 mg) by mouth daily    Urinary retention       warfarin 5 MG tablet    COUMADIN    135 tablet    Take 1 1/2 tablets (7.5 mg) by mouth TWTFSS; 1 tablet (5 mg) on Mondays OR as directed by INR Clinic    Long-term (current) use of anticoagulants, S/P aortic valve replacement

## 2018-10-19 NOTE — PATIENT INSTRUCTIONS
Your BMI is Body mass index is 37.04 kg/(m^2).  Weight management is a personal decision.  If you are interested in exploring weight loss strategies, the following discussion covers the approaches that may be successful. Body mass index (BMI) is one way to tell whether you are at a healthy weight, overweight, or obese. It measures your weight in relation to your height.  A BMI of 18.5 to 24.9 is in the healthy range. A person with a BMI of 25 to 29.9 is considered overweight, and someone with a BMI of 30 or greater is considered obese. More than two-thirds of American adults are considered overweight or obese.  Being overweight or obese increases the risk for further weight gain. Excess weight may lead to heart disease and diabetes.  Creating and following plans for healthy eating and physical activity may help you improve your health.  Weight control is part of healthy lifestyle and includes exercise, emotional health, and healthy eating habits. Careful eating habits lifelong are the mainstay of weight control. Though there are significant health benefits from weight loss, long-term weight loss with diet alone may be very difficult to achieve- studies show long-term success with dietary management in less than 10% of people. Attaining a healthy weight may be especially difficult to achieve in those with severe obesity. In some cases, medications, devices and surgical management might be considered.  What can you do?  If you are overweight or obese and are interested in methods for weight loss, you should discuss this with your provider.     Consider reducing daily calorie intake by 500 calories.     Keep a food journal.     Avoiding skipping meals, consider cutting portions instead.    Diet combined with exercise helps maintain muscle while optimizing fat loss. Strength training is particularly important for building and maintaining muscle mass. Exercise helps reduce stress, increase energy, and improves  fitness. Increasing exercise without diet control, however, may not burn enough calories to loose weight.       Start walking three days a week 10-20 minutes at a time    Work towards walking thirty minutes five days a week     Eventually, increase the speed of your walking for 1-2 minutes at time    In addition, we recommend that you review healthy lifestyles and methods for weight loss available through the National Institutes of Health patient information sites:  http://win.niddk.nih.gov/publications/index.htm    And look into health and wellness programs that may be available through your health insurance provider, employer, local community center, or dominick club.    Weight management plan: Patient was referred to their PCP to discuss a diet and exercise plan.     Your blood pressure was checked while you were in clinic today.  Please read the guidelines below about what these numbers mean and what you should do about them.  Your systolic blood pressure is the top number.  This is the pressure when the heart is pumping.  Your diastolic blood pressure is the bottom number.  This is the pressure in between beats.  If your systolic blood pressure is less than 120 and your diastolic blood pressure is less than 80, then your blood pressure is normal. There is nothing more that you need to do about it  If your systolic blood pressure is 120-139 or your diastolic blood pressure is 80-89, your blood pressure may be higher than it should be.  You should have your blood pressure re-checked within a year by a primary care provider.  If your systolic blood pressure is 140 or greater or your diastolic blood pressure is 90 or greater, you may have high blood pressure.  High blood pressure is treatable, but if left untreated over time it can put you at risk for heart attack, stroke, or kidney failure.  You should have your blood pressure re-checked by a primary care provider within the next four weeks.

## 2018-10-19 NOTE — PROGRESS NOTES
[facility] [place]  Outpatient Sleep Medicine Consultation  October 19, 2018      Name: Fausto Farr MRN# [id number]   Age: 78 year old YOB: 1940     Date of Consultation: October 19, 2018  Consultation is requested by: No referring provider defined for this encounter.  Primary care provider: Tu Reyes               Assessment and Plan:       Obstructive sleep apnea: Moderate of sleep obstructive sleep apnea with an apnea hypotony index of 19 and hypoxemia with 18 minutes of oxygen saturation less than or equal to 89% currently on CPAP    Coronary artery disease status post bypass; status post mitral and aortic valve replacement; paroxysmal atrial fibrillation    Normal LV function        Summary Recommendations:      Continue CPAP    RTC 1 year    Potential complications of untreated disease reviewed; options for CPAP device with travelling deferred by patient.            History of Present Illness:     Fausto Farr is a 78 year old male with coronary artery disease who started CPAP 1 year ago for moderate sleep apnea with hypoxemia. He has had loud snoring but no sleepiness nor insomnia. CPAP was started for cardioprotection given associated hypoxemia.      aCPAP 6-16   compliance:  Dates: September 17 - October 17, 2018        100 % >4hour use       Average Use 8 hours  Leak 95th percentile leak 35  Residual AHI less than 2            Medications:     Current Outpatient Prescriptions   Medication Sig     aspirin 81 MG EC tablet Take 1 tablet (81 mg) by mouth daily     betamethasone valerate (VALISONE) 0.1 % lotion APPLY TOPICALLY TWICE DAILY PRN     ezetimibe (ZETIA) 10 MG tablet Take 1 tablet (10 mg) by mouth daily     fluocinonide (LIDEX) 0.05 % ointment 2- 30 gram tubes.  Apply twice a day as needed.     furosemide (LASIX) 40 MG tablet Take 0.5 tablets (20 mg) by mouth daily     HERBALS White willow bark, Turmeric, Ginger & Yucca mixed powder(1.5 tsp mixed in  cranberry juice) every day     mesalamine (ASACOL HD) 800 MG EC tablet Take 1 tablet (800 mg) by mouth 2 times daily     metoprolol tartrate (LOPRESSOR) 25 MG tablet Take 1 tablet (25 mg) by mouth 2 times daily     Potassium Chloride ER 20 MEQ TBCR Take 1 tablet (20 mEq) by mouth daily     rosuvastatin (CRESTOR) 40 MG tablet Take 1 tablet (40 mg) by mouth daily     tamsulosin (FLOMAX) 0.4 MG capsule Take 1 capsule (0.4 mg) by mouth daily     warfarin (COUMADIN) 5 MG tablet Take 1 1/2 tablets (7.5 mg) by mouth TWTFSS; 1 tablet (5 mg) on Mondays OR as directed by INR Clinic     No current facility-administered medications for this visit.         Allergies   Allergen Reactions     Bees Anaphylaxis            Past Medical History:     Does not need 02 supplement at night   Past Medical History:   Diagnosis Date     Abdominal pain      Abnormal ECG     RBBB, 1st degree AVB, Left axis deviation     Anemia     currently taking iron     Arrhythmia     pac, pvc     Back pain     since 1980     Bruit      CAD (coronary artery disease)      Cellulitis 10/18/12     Cellulitis 05/2018    GrpB strep LLE cellulitis  negative RACHAEL for veg     Contact dermatitis and other eczema, due to unspecified cause      Diaphragmatic hernia without mention of obstruction or gangrene      Gastric ulcer      Glucose intolerance (impaired glucose tolerance)      Heart murmur 9/16/13    valvular heart disease     Hyperlipidaemia      Hypertension 8/6/13     Lumbago      Malaise and fatigue      Metabolic syndrome      Mobitz (type) I (Wenckebach's) atrioventricular block     and RBBB     Nocturia 10/18/12     Nocturia      Nonallopathic lesion of cervical region      Nonallopathic lesion of lumbar region      Nonallopathic lesion of pelvic region, not elsewhere classified      Nonallopathic lesion of rib cage      Nonallopathic lesion of sacral region      Paroxysmal atrial fibrillation (H) 10/18/12     Prostate cancer (H) 2008    radiation seed,  XRT      PVD (peripheral vascular disease) (H)      Rotator cuff strain     and sprain     S/P aortic valve replacement 2006    developed perivalve leak and MS, therefore redo surg 2013     S/P CABG (coronary artery bypass graft) 2006    Lima-Lad, RA-Rca     Sciatica of left side     since 2000     Sepsis due to group B Streptococcus (H) 5/19/2018     Sleep apnea     pt declined cpap     Ulcerative colitis (H)      Vitamin D deficiency              Past Surgical History:      Past Surgical History:   Procedure Laterality Date     AORTIC VALVE REPLACEMENT  1/3/06    redo AVR SJM 21mm and SJM MVR 27mm in 2013SJM 21([id number]):AVR, SJM 27 :MVR-     ARTHROPLASTY KNEE      right knee     BACK SURGERY  Oct 2015    Fusion L4-5, laminectomy L2, L3     BYPASS GRAFT ARTERY CORONARY  10/2013    reimplantation of radial artery graft to RCA     C CABG, VEIN, TWO  1/3/06    Left radial to RCA, LIMA to LAD (RA to RCA reimplanted at time of 2013 surg)     CARDIAC CATHERIZATION  11/2005    Stent placed to RCA     CARDIAC CATHERIZATION  04/2013    Occluded RCA, patent LIMA to LAD and radial graft to PDA     CARPAL TUNNEL RELEASE RT/LT  1994     COLONOSCOPY  8-22-11     CYSTOSCOPY FLEXIBLE  10/16/2013    Procedure: CYSTOSCOPY FLEXIBLE;  FLEXIBLE CYSTOSCOPY / DILATION OF URETHRA / INSERTION OF LESLIE;  Surgeon: Cooper Wallace MD;  Location:  OR     ENDOVASCULAR REPAIR ANEURYSM ABDOMINAL AORTA  2006     ENDOVASCULAR REPAIR, INFRARENAL ABDOMINAL AORTIC ANEURYSM/DISSECTION; MODULAR BIFURCATED PROSTHESIS      AAA repair endovascular     ENT SURGERY       GENITOURINARY SURGERY  6/16/08    radioactive seeding     HEAD & NECK SURGERY  1997    vocal cord polypectomy     KNEE SURGERY  2001 Right knee arthroscopy     OPTICAL TRACKING SYSTEM FUSION SPINE POSTERIOR LUMBAR THREE+ LEVELS N/A 10/29/2015    Procedure: OPTICAL TRACKING SYSTEM FUSION SPINE POSTERIOR LUMBAR THREE+ LEVELS;  Surgeon: Walt Garcia MD;  Location:  OR      PROSTATE SURGERY  06/16/2008 Radioactive seeding     PROSTATE SURGERY      radioactive seeding 6/16/08     REPAIR ANEURYSM ABDOMINAL AORTA  06/08     REPAIR VALVE MITRAL  10/16/2013    SJM 21([id number]):AVR, SJM 27 :MVRProcedure: REPAIR VALVE MITRAL;  REDO STERNOTOMY/REDO AORTIC VALVE REPLACEMENT/ MITRAL VALVE REPLACEMENT/REIMPLANTATION OF RIGHT CORONARY ARTERY BYPASS WITH RACHAEL ( ON PUMP);  Surgeon: Viet Singh MD;  Location:  OR     REPLACE VALVE AORTIC  10/16/2013    Procedure: REPLACE VALVE AORTIC;;  Surgeon: Viet Singh MD;  Location:  OR     SURGERY GENERAL IP CONSULT  05/2008 Excision aneurysm abdominal aorta     SURGERY GENERAL IP CONSULT  1997 Vocal cord polypectomy     VASCULAR SURGERY  1968, 1993     varicose vein stripping                      Physical Examination:   There were no vitals taken for this visit.             Copy to: Tu Reyes MD 10/19/2018     [facility] [place]   Floor 1, [address]   ?[address] [place] [address]   Appointments: [phone]    [facility]  3rd Floor  [address] , [place] [address]     Total time spent with patient: 25 min >50% counseling

## 2018-10-19 NOTE — NURSING NOTE
"Chief Complaint   Patient presents with     RECHECK     Annual f/u cpap       Initial /56  Pulse 59  Resp 15  Ht 1.664 m (5' 5.5\")  Wt 102.5 kg (226 lb)  SpO2 97%  BMI 37.04 kg/m2 Estimated body mass index is 37.04 kg/(m^2) as calculated from the following:    Height as of this encounter: 1.664 m (5' 5.5\").    Weight as of this encounter: 102.5 kg (226 lb).    Medication Reconciliation: complete    Aure Camara LPN/MIGUELANGEL    DME: Y  "

## 2018-10-29 ENCOUNTER — ANTICOAGULATION THERAPY VISIT (OUTPATIENT)
Dept: NURSING | Facility: CLINIC | Age: 78
End: 2018-10-29
Payer: MEDICARE

## 2018-10-29 DIAGNOSIS — Z95.2 S/P MITRAL VALVE REPLACEMENT: ICD-10-CM

## 2018-10-29 DIAGNOSIS — I48.91 AF (ATRIAL FIBRILLATION) (H): ICD-10-CM

## 2018-10-29 DIAGNOSIS — Z79.01 LONG TERM CURRENT USE OF ANTICOAGULANT THERAPY: ICD-10-CM

## 2018-10-29 DIAGNOSIS — G47.33 OSA (OBSTRUCTIVE SLEEP APNEA): Primary | ICD-10-CM

## 2018-10-29 LAB — INR POINT OF CARE: 2.9 (ref 0.86–1.14)

## 2018-10-29 PROCEDURE — 85610 PROTHROMBIN TIME: CPT | Mod: QW

## 2018-10-29 PROCEDURE — 99207 ZZC NO CHARGE NURSE ONLY: CPT

## 2018-10-29 PROCEDURE — 36416 COLLJ CAPILLARY BLOOD SPEC: CPT

## 2018-10-29 NOTE — MR AVS SNAPSHOT
Fausto Carson Yordan   10/29/2018 9:30 AM   Anticoagulation Therapy Visit    Description:  78 year old male   Provider:   ANTICOAGULATION CLINIC   Department:   Nurse           INR as of 10/29/2018     Today's INR 2.9      Anticoagulation Summary as of 10/29/2018     INR goal 2.5-3.5   Today's INR 2.9   Full warfarin instructions 5 mg on Mon, Wed, Fri; 7.5 mg all other days   Next INR check 11/26/2018    Indications   AF (atrial fibrillation) (H) [I48.91]  S/P mitral valve replacement [Z95.2]  Long term current use of anticoagulant therapy [Z79.01]         Your next Anticoagulation Clinic appointment(s)     Oct 29, 2018  9:30 AM CDT   Anticoagulation Visit with  ANTICOAGULATION CLINIC   [facility]    [address] [place] [address]  [address]  [place] [id number] [phone]            Nov 26, 2018  9:30 AM CST   Anticoagulation Visit with  ANTICOAGULATION CLINIC   [facility] ([facility]    [address] [place] [address]  [address]  [facility] [id number] [phone]              Contact Numbers     [place] [facility]  Please call  [phone] to cancel and/or reschedule your appointment   Please call  [phone] with any problems or questions regarding your therapy.        October 2018 Details    Sun Mon Tue Wed Thu Fri Sat      1               2               3               4               5               6                 7               8               9               10               11               12               13                 14               15               16               17               18               19               20                 21               22               23               24               25               26               27                 28               29      5 mg   See details      30      7.5 mg         31      5 mg             Date Details   10/29 This INR check               How to take your  warfarin dose     To take:  5 mg Take 1 of the 5 mg tablets.    To take:  7.5 mg Take 1.5 of the 5 mg tablets.           November 2018 Details    Sun Mon Tue Wed Thu Fri Sat         1      7.5 mg         2      5 mg         3      7.5 mg           4      7.5 mg         5      5 mg         6      7.5 mg         7      5 mg         8      7.5 mg         9      5 mg         10      7.5 mg           11      7.5 mg         12      5 mg         13      7.5 mg         14      5 mg         15      7.5 mg         16      5 mg         17      7.5 mg           18      7.5 mg         19      5 mg         20      7.5 mg         21      5 mg         22      7.5 mg         23      5 mg         24      7.5 mg           25      7.5 mg         26            27               28               29               30                 Date Details   No additional details    Date of next INR:  11/26/2018         How to take your warfarin dose     To take:  5 mg Take 1 of the 5 mg tablets.    To take:  7.5 mg Take 1.5 of the 5 mg tablets.

## 2018-10-29 NOTE — PROGRESS NOTES
ANTICOAGULATION FOLLOW-UP CLINIC VISIT    Patient Name:  Fausto Farr  Date:  10/29/2018  Contact Type:  Face to Face  INR today is in range, no concerns from pt. Advised to continue maintenance dosing & recheck in 4 weeks. Continue the herbal supplement as discussed in your INR visit on 10/15/18.    SUBJECTIVE:     Patient Findings     Positives No Problem Findings    Comments Denies bleeding/bruising or missed dose.             OBJECTIVE    INR Protime   Date Value Ref Range Status   10/29/2018 2.9 (A) 0.86 - 1.14 Final       ASSESSMENT / PLAN  INR assessment THER    Recheck INR In: 4 WEEKS    INR Location Clinic      Anticoagulation Summary as of 10/29/2018     INR goal 2.5-3.5   Today's INR 2.9   Warfarin maintenance plan 5 mg (5 mg x 1) on Mon, Wed, Fri; 7.5 mg (5 mg x 1.5) all other days   Full warfarin instructions 5 mg on Mon, Wed, Fri; 7.5 mg all other days   Weekly warfarin total 45 mg   No change documented Chayito Sanders RN   Plan last modified Chayito Sanders RN (10/15/2018)   Next INR check 11/26/2018   Priority INR   Target end date Indefinite    Indications   AF (atrial fibrillation) (H) [I48.91]  S/P mitral valve replacement [Z95.2]  Long term current use of anticoagulant therapy [Z79.01]         Anticoagulation Episode Summary     INR check location     Preferred lab     Send INR reminders to [facility]    Comments 5mg tabs - andrade dose // transfer from [facility] // [employer] [place] // CALENDAR      Anticoagulation Care Providers     Provider Role Specialty Phone number    Tu Reyes MD  Internal Medicine [phone]            See the Encounter Report to view Anticoagulation Flowsheet and Dosing Calendar (Go to Encounters tab in chart review, and find the Anticoagulation Therapy Visit)    Chayito Sanders RN

## 2018-11-01 ENCOUNTER — OFFICE VISIT (OUTPATIENT)
Dept: CARDIOLOGY | Facility: CLINIC | Age: 78
End: 2018-11-01
Attending: INTERNAL MEDICINE
Payer: MEDICARE

## 2018-11-01 ENCOUNTER — TELEPHONE (OUTPATIENT)
Dept: CARDIOLOGY | Facility: CLINIC | Age: 78
End: 2018-11-01

## 2018-11-01 VITALS
WEIGHT: 227 LBS | DIASTOLIC BLOOD PRESSURE: 64 MMHG | HEIGHT: 66 IN | BODY MASS INDEX: 36.48 KG/M2 | HEART RATE: 64 BPM | SYSTOLIC BLOOD PRESSURE: 118 MMHG

## 2018-11-01 DIAGNOSIS — I87.2 VENOUS (PERIPHERAL) INSUFFICIENCY: Primary | ICD-10-CM

## 2018-11-01 DIAGNOSIS — I83.93 VARICOSE VEINS OF BOTH LOWER EXTREMITIES, UNSPECIFIED WHETHER COMPLICATED: ICD-10-CM

## 2018-11-01 PROCEDURE — 99214 OFFICE O/P EST MOD 30 MIN: CPT | Performed by: PHYSICIAN ASSISTANT

## 2018-11-01 NOTE — TELEPHONE ENCOUNTER
ANDERS Will    This patient followed up in [place], so did not see Fatimah, unfortunately.     I saw the venous comp results. Given his prior vein stripping and anatomy, I just prescribed compression stockings and arranged for him to see you back in 1 month.  I will be there to shadow/learn about options.     Warren Scales PA-C 11/1/2018 5:12 PM

## 2018-11-01 NOTE — PROGRESS NOTES
VASCULAR and CARDIOLOGY CLINIC PROGRESS NOTE    DOS: 2018      Fausto Farr  : 1940, 78 year old  MRN: [id number]      History: I had the pleasure of meeting Fausto Farr today in the vascular clinic.  He was accompanied by his wife, Ritu.  He is a patient of Dr. Santo, but has also seen Dr. Vasquez () and Dr. Wyatt (vascular clinic).     Ralph is a very pleasant 78 year old man with extensive prior cardiovascular history.     He underwent 2-vessel coronary bypass grafting (LIMA to his LAD and a left radial graft to his right coronary artery) and aortic valve replacement in .  Over the years, he developed a perivalvular leak and also was found to have significant mitral stenosis.  In , he underwent redo mechanical aortic valve replacement and mechanical mitral valve replacement.  He has remained on chronic warfarin.  Followup echocardiograms have shown normal valvular gradients.     He has AV conduction disease and PVCs.  In 2018, Dr. Santo ordered a treadmill stress test to evaluate BRISENO as he wondered whether it may be related to chronotropic incompetence or development of AV block with exertion.  The patient had a stress test on 2018 where he lasted only 4 minutes and 25 seconds on a modified Waqas protocol.  He achieved a peak heart rate of only 106 beats per minute.  He stopped because of back and hip pain rather than fatigue or shortness of breath.  There was no AV block with exertion.    A Holter was done that showed frequent PVCs, burden of approximately 10%.  There were episodes of possible transient second-degree AV block and a 15-beat run of AIVR.  No high-grade bradycardia or tachycardia was noted.   He saw Dr. Vasquez 18, and though he was noted to have conduction disease, there was no clear high-grade AV block or sxs to justify a pacemaker implantation. Dr. Vasquez recommended repeating a 10 day Zio Patch in 3/2019 to reassess his cardiac rhythm and  specifically look for intermittent AV block.     He has had previous endovascular AAA repair in 2010 (by Dr. Mays from Vascular Surgery as well as IR).  His most recent vascular evaluation was aortoiliac ultrasound performed on 07/10/2018.  That ultrasound was read as showing patent [id number]-ro-[id number] stent graft with aneurysmal sac size of 7 cm AP x 6 cm transverse, previously 5.8 cm AP x 6.4 cm transverse in 2015.  There is aneurysmal dilatation of the left common iliac artery measuring up to 2.4 cm which has increased from previous ultrasound.      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  He continues to have exertional lower back pain radiating to his hips.  The discomfort starts in the coccyx and buttocks area.  It is usually predictable and is brought on by exertion.  The symptoms are usually better if he is leaning forward, especially if he is supported by a shopping cart.  He can walk with a shopping cart leaning forward in the store without any difficulty.  He denies any rest pain.  His symptoms are suggestive of neurogenic claudication but he reports that he was recently seen by his spine surgeon who performed imaging.  He was told there was no structural abnormality in his lower back.   He did undergo ABIs that showed both normal resting and exercise ABIs.   He had venous competency studies done.  No DVTs bilaterally.    On the right, he is s/p vein stripping of the GSV. There was varicose vein from upper thigh to the ankle, up to 4mm in size with up to 5.4 sec of reflux.   On the left, the left GSV is dilated with severe reflux throughout, from 4.1 to 6.7 sec.  Significant varicose veins communicating with the GSV, reflux of 1.4 to 3.1 sec    He also has obstructive sleep apnea and is on CPAP therapy since late 2017.      For his BRISENO, Dr. Santo ordered the treadmill stress test as noted above.  Given Ralph's prior smoking history, Dr. Santo also ordered PFTS.  The PFTs were done  7/16/18 and showed normal forced vital capacity and FEV1 at 86% of predicted.  FEV1 over FVC ratio is normal at 76%. Lung volumes normal, diffusing capacity is minimally reduced at 75% of predicted.  He did see Dr. Galeana, who noted the unremarkable PFTs.  He did consider asthma may be contributing. So a trial of Trelegy was prescribed.  I do not see he followed up with Dr. Galeana.   Dr. Santo recommended that when/if he needs to be off anticoagulation, we should use that time to perform a  RHC.         Interval History:   He presents today for follow up.   We reviewed the KEERTHI and venous competency study results.     He says he previously had vein stripping of the RLE. He has varicose veins in both legs.   He used to wear thigh high compression stockings every day for years, but stopped wearing them when he retired. He is interested in trying them again.     He continues to have exertional lower back pain radiating to his hips.  The discomfort starts in the coccyx and buttocks area.  The symptoms are usually better if he is leaning forward, especially if he is supported by a shopping cart.  He can walk with a shopping cart leaning forward in the store without any difficulty.  He denies any rest pain.       ROS:  Skin:  Negative     Eyes:  Positive for glasses  ENT:  Positive for hearing loss  Respiratory:  Positive for dyspnea on exertion  Cardiovascular:    Positive for;lightheadedness;edema  Gastroenterology: Positive for    Genitourinary:  Negative    Musculoskeletal:  Positive for joint pain;back pain;arthritis  Neurologic:  Negative    Psychiatric:  not assessed    Heme/Lymph/Imm:  Positive for allergies  Endocrine:  Negative      PAST MEDICAL HISTORY:  Past Medical History:   Diagnosis Date     Abdominal pain      Abnormal ECG     RBBB, 1st degree AVB, Left axis deviation     Anemia     currently taking iron     Arrhythmia     pac, pvc     Back pain     since 1980     Bruit      CAD (coronary artery disease)       Cellulitis 10/18/12     Cellulitis 05/2018    GrpB strep LLE cellulitis  negative RACHAEL for veg     Contact dermatitis and other eczema, due to unspecified cause      Diaphragmatic hernia without mention of obstruction or gangrene      Gastric ulcer      Glucose intolerance (impaired glucose tolerance)      Heart murmur 9/16/13    valvular heart disease     Hyperlipidaemia      Hypertension 8/6/13     Lumbago      Malaise and fatigue      Metabolic syndrome      Mobitz (type) I (Wenckebach's) atrioventricular block     and RBBB     Nocturia 10/18/12     Nocturia      Nonallopathic lesion of cervical region      Nonallopathic lesion of lumbar region      Nonallopathic lesion of pelvic region, not elsewhere classified      Nonallopathic lesion of rib cage      Nonallopathic lesion of sacral region      Paroxysmal atrial fibrillation (H) 10/18/12     Prostate cancer (H) 2008    radiation seed, XRT      PVD (peripheral vascular disease) (H)      Rotator cuff strain     and sprain     S/P aortic valve replacement 2006    developed perivalve leak and MS, therefore redo surg 2013     S/P CABG (coronary artery bypass graft) 2006    Lima-Lad, RA-Rca     Sciatica of left side     since 2000     Sepsis due to group B Streptococcus (H) 5/19/2018     Sleep apnea     pt declined cpap     Ulcerative colitis (H)      Vitamin D deficiency        PAST SURGICAL HISTORY:  Past Surgical History:   Procedure Laterality Date     AORTIC VALVE REPLACEMENT  1/3/06    redo AVR SJM 21mm and SJM MVR 27mm in 2013SJM 21([id number]):AVR, SJM 27 :MVR-     ARTHROPLASTY KNEE      right knee     BACK SURGERY  Oct 2015    Fusion L4-5, laminectomy L2, L3     BYPASS GRAFT ARTERY CORONARY  10/2013    reimplantation of radial artery graft to RCA     C CABG, VEIN, TWO  1/3/06    Left radial to RCA, LIMA to LAD (RA to RCA reimplanted at time of 2013 surg)     CARDIAC CATHERIZATION  11/2005    Stent placed to RCA     CARDIAC CATHERIZATION  04/2013     Occluded RCA, patent LIMA to LAD and radial graft to PDA     CARPAL TUNNEL RELEASE RT/LT  1994     COLONOSCOPY  8-22-11     CYSTOSCOPY FLEXIBLE  10/16/2013    Procedure: CYSTOSCOPY FLEXIBLE;  FLEXIBLE CYSTOSCOPY / DILATION OF URETHRA / INSERTION OF LESLIE;  Surgeon: Cooper Wallace MD;  Location: [facility] OR     ENDOVASCULAR REPAIR ANEURYSM ABDOMINAL AORTA  2006     ENDOVASCULAR REPAIR, INFRARENAL ABDOMINAL AORTIC ANEURYSM/DISSECTION; MODULAR BIFURCATED PROSTHESIS      AAA repair endovascular     ENT SURGERY       GENITOURINARY SURGERY  6/16/08    radioactive seeding     HEAD & NECK SURGERY  1997    vocal cord polypectomy     KNEE SURGERY  2001 Right knee arthroscopy     OPTICAL TRACKING SYSTEM FUSION SPINE POSTERIOR LUMBAR THREE+ LEVELS N/A 10/29/2015    Procedure: OPTICAL TRACKING SYSTEM FUSION SPINE POSTERIOR LUMBAR THREE+ LEVELS;  Surgeon: Walt Garcia MD;  Location: [facility] OR     PROSTATE SURGERY  06/16/2008 Radioactive seeding     PROSTATE SURGERY      radioactive seeding 6/16/08     REPAIR ANEURYSM ABDOMINAL AORTA  06/08     REPAIR VALVE MITRAL  10/16/2013    SJM 21([id number]):AVR, SJM 27 :MVRProcedure: REPAIR VALVE MITRAL;  REDO STERNOTOMY/REDO AORTIC VALVE REPLACEMENT/ MITRAL VALVE REPLACEMENT/REIMPLANTATION OF RIGHT CORONARY ARTERY BYPASS WITH RACHAEL ( ON PUMP);  Surgeon: Viet Singh MD;  Location:  OR     REPLACE VALVE AORTIC  10/16/2013    Procedure: REPLACE VALVE AORTIC;;  Surgeon: Viet Singh MD;  Location:  OR     SURGERY GENERAL IP CONSULT  05/2008 Excision aneurysm abdominal aorta     SURGERY GENERAL IP CONSULT  1997 Vocal cord polypectomy     VASCULAR SURGERY  1968, 1993     varicose vein stripping       SOCIAL HISTORY:  Social History     Social History     Marital status:      Spouse name: N/A     Number of children: N/A     Years of education: N/A     Social History Main Topics     Smoking status: Former Smoker     Packs/day: 1.00     Years: 40.00     Quit date:  10/23/2002     Smokeless tobacco: Never Used     Alcohol use Yes      Comment: a couple beers per week (socially)     Drug use: No     Sexual activity: No     Other Topics Concern     Parent/Sibling W/ Cabg, Mi Or Angioplasty Before 65f 55m? Yes     Brother had bypass at 55     Caffeine Concern No     6-8 cups of half and half per day     Special Diet No     Exercise No     Social History Narrative       FAMILY HISTORY:  Family History   Problem Relation Age of Onset     Coronary Artery Disease Father      CABG     HEART DISEASE Father      Pacemaker     Other Cancer Daughter      HEART DISEASE Brother      Other - See Comments Grandchild        MEDS:   Current Outpatient Prescriptions on File Prior to Visit:  aspirin 81 MG EC tablet Take 1 tablet (81 mg) by mouth daily   betamethasone valerate (VALISONE) 0.1 % lotion APPLY TOPICALLY TWICE DAILY PRN   ezetimibe (ZETIA) 10 MG tablet Take 1 tablet (10 mg) by mouth daily   fluocinonide (LIDEX) 0.05 % ointment 2- 30 gram tubes.  Apply twice a day as needed.   furosemide (LASIX) 40 MG tablet Take 0.5 tablets (20 mg) by mouth daily   HERBALS White willow bark, Turmeric, Ginger, botswellia & Yucca mixed powder(1.5 tsp mixed in cranberry juice or orange juice) every day   mesalamine (ASACOL HD) 800 MG EC tablet Take 1 tablet (800 mg) by mouth 2 times daily   metoprolol tartrate (LOPRESSOR) 25 MG tablet Take 1 tablet (25 mg) by mouth 2 times daily   Potassium Chloride ER 20 MEQ TBCR Take 1 tablet (20 mEq) by mouth daily   rosuvastatin (CRESTOR) 40 MG tablet Take 1 tablet (40 mg) by mouth daily   tamsulosin (FLOMAX) 0.4 MG capsule Take 1 capsule (0.4 mg) by mouth daily   warfarin (COUMADIN) 5 MG tablet Take 1 1/2 tablets (7.5 mg) by mouth TWTFSS; 1 tablet (5 mg) on Mondays OR as directed by INR Clinic     No current facility-administered medications on file prior to visit.     ALLERGIES:   Allergies   Allergen Reactions     Bees Anaphylaxis       PHYSICAL EXAM:  Vitals: BP  "118/64 (BP Location: Right arm, Patient Position: Chair, Cuff Size: Adult Large)  Pulse 64  Ht 1.664 m (5' 5.5\")  Wt 103 kg (227 lb)  BMI 37.2 kg/m2  Constitutional:  cooperative;well developed;well nourished;in no acute distress        Skin:  warm and dry to the touch;no apparent skin lesions or masses noted        Head:  normocephalic        Eyes:  conjunctivae and lids unremarkable        ENT:  no pallor or cyanosis        Neck:  JVP normal;carotid pulses are full and equal bilaterally        Respiratory:  clear to auscultation        Cardiac: regular rhythm occasional premature beats   crisp prosthetic valve sounds            GI:  abdomen soft;BS normoactive obese      Vascular:                                        Extremities and Musculoskeletal:           Neurological:  no gross motor deficits;affect appropriate            LABS/DATA:  I reviewed the following:  10/12/18 venous comp studies:  CONCLUSION:  1. Right lower extremity veins and Limited iliac vein:  1. Normal compressibility of the deep veins of the right lower  extremity was demonstrated without signs of venous thrombosis.  2. Right GSV is not visualized from the thigh to ankle, s/p stripping  3. Varicose vein from upper thigh to the ankle, up to 4mm in size with  up to 5.4 sec of reflux     Left lower extremity veins and limited iliac vein:  1. Normal compressibility of the deep veins of the right lower  extremity was demonstrated without signs of venous thrombosis.  2. Left GSV is dilated with severe reflux throughout, from 4.1 to 6.7  sec  3. Significant varicose veins communicating with the GSV, reflux of  1.4 to 3.1 sec        10/12/18 ABIs:  CONCLUSIONS:  1. Physiologic testing with exercise indicates normal resting and  exercise KEERTHI's  2. The patient performed treadmill exercised for 5 minutes at 2.0  miles per hour at 12% elevation.  Symptoms of bilateral leg fatigue at  1:30, bilateral buttock pain at 2:00, test stopped at 2:25 due " "to  dyspnea.      ASSESSMENT/PLAN:  1.  Lower back pain as well as bilateral lower extremity discomfort with walking.  ABIs are normal.  He says he has seen ortho/spine and no structural abnormality in his lower back.   - We will proceed with the venous insufficiency evaluation/treatment as noted below.   - Then Dr. Wyatt recommends that he go back to ortho/spine for further evaluation.     2.  Chronic lower extremity venous insufficiency as well as varicose veins status post prior RLE venous stripping.  Venous competency study shows:   On the right, he is s/p vein stripping of the GSV. There was varicose vein from upper thigh to the ankle, up to 4mm in size with up to 5.4 sec of reflux.   On the left, the left GSV is dilated with severe reflux throughout, from 4.1 to 6.7 sec.  Significant varicose veins communicating with the GSV, reflux of 1.4 to 3.1 sec  - Bilateral thigh high compression stockings x 1 month.   - Given his prior vein stripping, and anatomy, will have him see Dr. Wyatt back in clinic in 1 month to review options of venous ablation vs venaseal.  Note to RN prepping chart, please let Dr. Wyatt know I would like to be present at that visit.     3.  History of AAA endovascular repair in 2010.  Follows with Vascular Surgery.     4.  Right common iliac artery aneurysm, followed by Vascular Surgery/IR.     5.  History of valvular heart disease. S/p AVR in 2006 with subsequent perivalvular leak and redo mechanical AVR with concomitant mechanical MVR in 2013.  Last RACHAEL was done 5/21/18 to evaluate for endocarditis, but did show normal gradients.   - Continue Coumadin    6.  Chronic diastolic heart failure.  LVEF is 55%-60%.  BP is controlled.   - Continue metoprolol, Lasix.     7.  Coronary artery disease with CABG (LIMA to LAD and radial graft to RCA) in 2006.   - Continue ASA, BB, statin.     8.  AV conduction disease with \"trifascicular\" block.  He is on low dose metoprolol.    - Monitor will be repeated " in 3/2019 to reassess his cardiac rhythm and specifically look for intermittent AV block.     9.  PVCs.  Holter monitor showed 10% PVC burden.  He is on metoprolol 25 mg BID.    - Will not titrate given his AV conduction disease.   - Repeating monitor next year as noted above.     10.  Obstructive sleep apnea with use of CPAP.     11.  BRISENO.   - treadmill stress test to evaluate BRISENO showed no e/o chronotropic incompetence or development of high grade AV block with exertion   - GAGE is treated  - PFTs normal, has seen pulm.  Trial of Trelegy for asthma was prescribed.   - Normal valve gradients  - Preserved LVEF  - Dr. Santo recommended that when/if he needs to be off anticoagulation, we should use that time to perform a RHC.           KAYLEE KnightC

## 2018-11-01 NOTE — LETTER
2018    Tu Reyes MD, MD  [address] [place] [address]    RE: Fausto Grantth       Dear Colleague,    I had the pleasure of seeing Fausto Farr in the [facility].    VASCULAR and CARDIOLOGY CLINIC PROGRESS NOTE    DOS: 2018      Fausto Farr  : 1940, 78 year old  MRN: [id number]      History: I had the pleasure of meeting Fausto Farr today in the vascular clinic.  He was accompanied by his wife, Ritu.  He is a patient of Dr. Santo, but has also seen Dr. Vasquez () and Dr. Wyatt (vascular clinic).     Ralph is a very pleasant 78 year old man with extensive prior cardiovascular history.     He underwent 2-vessel coronary bypass grafting (LIMA to his LAD and a left radial graft to his right coronary artery) and aortic valve replacement in .  Over the years, he developed a perivalvular leak and also was found to have significant mitral stenosis.  In , he underwent redo mechanical aortic valve replacement and mechanical mitral valve replacement.  He has remained on chronic warfarin.  Followup echocardiograms have shown normal valvular gradients.     He has AV conduction disease and PVCs.  In 2018, Dr. Santo ordered a treadmill stress test to evaluate BRISENO as he wondered whether it may be related to chronotropic incompetence or development of AV block with exertion.  The patient had a stress test on 2018 where he lasted only 4 minutes and 25 seconds on a modified Waqas protocol.  He achieved a peak heart rate of only 106 beats per minute.  He stopped because of back and hip pain rather than fatigue or shortness of breath.  There was no AV block with exertion.    A Holter was done that showed frequent PVCs, burden of approximately 10%.  There were episodes of possible transient second-degree AV block and a 15-beat run of AIVR.  No high-grade bradycardia or tachycardia was noted.   He saw Dr. Vasquez 18, and  though he was noted to have conduction disease, there was no clear high-grade AV block or sxs to justify a pacemaker implantation. Dr. Vasquez recommended repeating a 10 day Zio Patch in 3/2019 to reassess his cardiac rhythm and specifically look for intermittent AV block.     He has had previous endovascular AAA repair in 2010 (by Dr. Mays from Vascular Surgery as well as IR).  His most recent vascular evaluation was aortoiliac ultrasound performed on 07/10/2018.  That ultrasound was read as showing patent [id number] stent graft with aneurysmal sac size of 7 cm AP x 6 cm transverse, previously 5.8 cm AP x 6.4 cm transverse in 2015.  There is aneurysmal dilatation of the left common iliac artery measuring up to 2.4 cm which has increased from previous ultrasound.      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  He continues to have exertional lower back pain radiating to his hips.  The discomfort starts in the coccyx and buttocks area.  It is usually predictable and is brought on by exertion.  The symptoms are usually better if he is leaning forward, especially if he is supported by a shopping cart.  He can walk with a shopping cart leaning forward in the store without any difficulty.  He denies any rest pain.  His symptoms are suggestive of neurogenic claudication but he reports that he was recently seen by his spine surgeon who performed imaging.  He was told there was no structural abnormality in his lower back.   He did undergo ABIs that showed both normal resting and exercise ABIs.   He had venous competency studies done.  No DVTs bilaterally.    On the right, he is s/p vein stripping of the GSV. There was varicose vein from upper thigh to the ankle, up to 4mm in size with up to 5.4 sec of reflux.   On the left, the left GSV is dilated with severe reflux throughout, from 4.1 to 6.7 sec.  Significant varicose veins communicating with the GSV, reflux of 1.4 to 3.1 sec    He also has  obstructive sleep apnea and is on CPAP therapy since late 2017.      For his BRISENO, Dr. Santo ordered the treadmill stress test as noted above.  Given Ralph's prior smoking history, Dr. Santo also ordered PFTS.  The PFTs were done 7/16/18 and showed normal forced vital capacity and FEV1 at 86% of predicted.  FEV1 over FVC ratio is normal at 76%. Lung volumes normal, diffusing capacity is minimally reduced at 75% of predicted.  He did see Dr. Galeana, who noted the unremarkable PFTs.  He did consider asthma may be contributing. So a trial of Trelegy was prescribed.  I do not see he followed up with Dr. Galeana.   Dr. Santo recommended that when/if he needs to be off anticoagulation, we should use that time to perform a  RHC.         Interval History:   He presents today for follow up.   We reviewed the KEERTHI and venous competency study results.     He says he previously had vein stripping of the RLE. He has varicose veins in both legs.   He used to wear thigh high compression stockings every day for years, but stopped wearing them when he retired. He is interested in trying them again.     He continues to have exertional lower back pain radiating to his hips.  The discomfort starts in the coccyx and buttocks area.  The symptoms are usually better if he is leaning forward, especially if he is supported by a shopping cart.  He can walk with a shopping cart leaning forward in the store without any difficulty.  He denies any rest pain.       ROS:  Skin:  Negative     Eyes:  Positive for glasses  ENT:  Positive for hearing loss  Respiratory:  Positive for dyspnea on exertion  Cardiovascular:    Positive for;lightheadedness;edema  Gastroenterology: Positive for    Genitourinary:  Negative    Musculoskeletal:  Positive for joint pain;back pain;arthritis  Neurologic:  Negative    Psychiatric:  not assessed    Heme/Lymph/Imm:  Positive for allergies  Endocrine:  Negative      PAST MEDICAL HISTORY:  Past Medical History:    Diagnosis Date     Abdominal pain      Abnormal ECG     RBBB, 1st degree AVB, Left axis deviation     Anemia     currently taking iron     Arrhythmia     pac, pvc     Back pain     since 1980     Bruit      CAD (coronary artery disease)      Cellulitis 10/18/12     Cellulitis 05/2018    GrpB strep LLE cellulitis  negative RACHAEL for veg     Contact dermatitis and other eczema, due to unspecified cause      Diaphragmatic hernia without mention of obstruction or gangrene      Gastric ulcer      Glucose intolerance (impaired glucose tolerance)      Heart murmur 9/16/13    valvular heart disease     Hyperlipidaemia      Hypertension 8/6/13     Lumbago      Malaise and fatigue      Metabolic syndrome      Mobitz (type) I (Wenckebach's) atrioventricular block     and RBBB     Nocturia 10/18/12     Nocturia      Nonallopathic lesion of cervical region      Nonallopathic lesion of lumbar region      Nonallopathic lesion of pelvic region, not elsewhere classified      Nonallopathic lesion of rib cage      Nonallopathic lesion of sacral region      Paroxysmal atrial fibrillation (H) 10/18/12     Prostate cancer (H) 2008    radiation seed, XRT      PVD (peripheral vascular disease) (H)      Rotator cuff strain     and sprain     S/P aortic valve replacement 2006    developed perivalve leak and MS, therefore redo surg 2013     S/P CABG (coronary artery bypass graft) 2006    Lima-Lad, RA-Rca     Sciatica of left side     since 2000     Sepsis due to group B Streptococcus (H) 5/19/2018     Sleep apnea     pt declined cpap     Ulcerative colitis (H)      Vitamin D deficiency        PAST SURGICAL HISTORY:  Past Surgical History:   Procedure Laterality Date     AORTIC VALVE REPLACEMENT  1/3/06    redo AVR SJM 21mm and SJM MVR 27mm in 2013SJM 21([id number]):AVR, SJM 27 :MVR-     ARTHROPLASTY KNEE      right knee     BACK SURGERY  Oct 2015    Fusion L4-5, laminectomy L2, L3     BYPASS GRAFT ARTERY CORONARY  10/2013     reimplantation of radial artery graft to RCA     C CABG, VEIN, TWO  1/3/06    Left radial to RCA, LIMA to LAD (RA to RCA reimplanted at time of 2013 surg)     CARDIAC CATHERIZATION  11/2005    Stent placed to RCA     CARDIAC CATHERIZATION  04/2013    Occluded RCA, patent LIMA to LAD and radial graft to PDA     CARPAL TUNNEL RELEASE RT/LT  1994     COLONOSCOPY  8-22-11     CYSTOSCOPY FLEXIBLE  10/16/2013    Procedure: CYSTOSCOPY FLEXIBLE;  FLEXIBLE CYSTOSCOPY / DILATION OF URETHRA / INSERTION OF LESLIE;  Surgeon: Cooper Wallace MD;  Location: [facility] OR     ENDOVASCULAR REPAIR ANEURYSM ABDOMINAL AORTA  2006     ENDOVASCULAR REPAIR, INFRARENAL ABDOMINAL AORTIC ANEURYSM/DISSECTION; MODULAR BIFURCATED PROSTHESIS      AAA repair endovascular     ENT SURGERY       GENITOURINARY SURGERY  6/16/08    radioactive seeding     HEAD & NECK SURGERY  1997    vocal cord polypectomy     KNEE SURGERY  2001 Right knee arthroscopy     OPTICAL TRACKING SYSTEM FUSION SPINE POSTERIOR LUMBAR THREE+ LEVELS N/A 10/29/2015    Procedure: OPTICAL TRACKING SYSTEM FUSION SPINE POSTERIOR LUMBAR THREE+ LEVELS;  Surgeon: Walt Garcia MD;  Location: [facility] OR     PROSTATE SURGERY  06/16/2008 Radioactive seeding     PROSTATE SURGERY      radioactive seeding 6/16/08     REPAIR ANEURYSM ABDOMINAL AORTA  06/08     REPAIR VALVE MITRAL  10/16/2013    SJM 21([id number]):AVR, SJM 27 :MVRProcedure: REPAIR VALVE MITRAL;  REDO STERNOTOMY/REDO AORTIC VALVE REPLACEMENT/ MITRAL VALVE REPLACEMENT/REIMPLANTATION OF RIGHT CORONARY ARTERY BYPASS WITH RACHAEL ( ON PUMP);  Surgeon: Viet Singh MD;  Location:  OR     REPLACE VALVE AORTIC  10/16/2013    Procedure: REPLACE VALVE AORTIC;;  Surgeon: Viet Singh MD;  Location:  OR     SURGERY GENERAL IP CONSULT  05/2008 Excision aneurysm abdominal aorta     SURGERY GENERAL IP CONSULT  1997 Vocal cord polypectomy     VASCULAR SURGERY  1968, 1993     varicose vein stripping       SOCIAL  HISTORY:  Social History     Social History     Marital status:      Spouse name: N/A     Number of children: N/A     Years of education: N/A     Social History Main Topics     Smoking status: Former Smoker     Packs/day: 1.00     Years: 40.00     Quit date: 10/23/2002     Smokeless tobacco: Never Used     Alcohol use Yes      Comment: a couple beers per week (socially)     Drug use: No     Sexual activity: No     Other Topics Concern     Parent/Sibling W/ Cabg, Mi Or Angioplasty Before 65f 55m? Yes     Brother had bypass at 55     Caffeine Concern No     6-8 cups of half and half per day     Special Diet No     Exercise No     Social History Narrative       FAMILY HISTORY:  Family History   Problem Relation Age of Onset     Coronary Artery Disease Father      CABG     HEART DISEASE Father      Pacemaker     Other Cancer Daughter      HEART DISEASE Brother      Other - See Comments Grandchild        MEDS:   Current Outpatient Prescriptions on File Prior to Visit:  aspirin 81 MG EC tablet Take 1 tablet (81 mg) by mouth daily   betamethasone valerate (VALISONE) 0.1 % lotion APPLY TOPICALLY TWICE DAILY PRN   ezetimibe (ZETIA) 10 MG tablet Take 1 tablet (10 mg) by mouth daily   fluocinonide (LIDEX) 0.05 % ointment 2- 30 gram tubes.  Apply twice a day as needed.   furosemide (LASIX) 40 MG tablet Take 0.5 tablets (20 mg) by mouth daily   HERBALS White willow bark, Turmeric, Ginger, botswellia & Yucca mixed powder(1.5 tsp mixed in cranberry juice or orange juice) every day   mesalamine (ASACOL HD) 800 MG EC tablet Take 1 tablet (800 mg) by mouth 2 times daily   metoprolol tartrate (LOPRESSOR) 25 MG tablet Take 1 tablet (25 mg) by mouth 2 times daily   Potassium Chloride ER 20 MEQ TBCR Take 1 tablet (20 mEq) by mouth daily   rosuvastatin (CRESTOR) 40 MG tablet Take 1 tablet (40 mg) by mouth daily   tamsulosin (FLOMAX) 0.4 MG capsule Take 1 capsule (0.4 mg) by mouth daily   warfarin (COUMADIN) 5 MG tablet Take 1 1/2  "tablets (7.5 mg) by mouth TWTFSS; 1 tablet (5 mg) on Mondays OR as directed by INR Clinic     No current facility-administered medications on file prior to visit.     ALLERGIES:   Allergies   Allergen Reactions     Bees Anaphylaxis       PHYSICAL EXAM:  Vitals: /64 (BP Location: Right arm, Patient Position: Chair, Cuff Size: Adult Large)  Pulse 64  Ht 1.664 m (5' 5.5\")  Wt 103 kg (227 lb)  BMI 37.2 kg/m2  Constitutional:  cooperative;well developed;well nourished;in no acute distress        Skin:  warm and dry to the touch;no apparent skin lesions or masses noted        Head:  normocephalic        Eyes:  conjunctivae and lids unremarkable        ENT:  no pallor or cyanosis        Neck:  JVP normal;carotid pulses are full and equal bilaterally        Respiratory:  clear to auscultation        Cardiac: regular rhythm occasional premature beats   crisp prosthetic valve sounds            GI:  abdomen soft;BS normoactive obese      Vascular:                                        Extremities and Musculoskeletal:           Neurological:  no gross motor deficits;affect appropriate            LABS/DATA:  I reviewed the following:  10/12/18 venous comp studies:  CONCLUSION:  1. Right lower extremity veins and Limited iliac vein:  1. Normal compressibility of the deep veins of the right lower  extremity was demonstrated without signs of venous thrombosis.  2. Right GSV is not visualized from the thigh to ankle, s/p stripping  3. Varicose vein from upper thigh to the ankle, up to 4mm in size with  up to 5.4 sec of reflux     Left lower extremity veins and limited iliac vein:  1. Normal compressibility of the deep veins of the right lower  extremity was demonstrated without signs of venous thrombosis.  2. Left GSV is dilated with severe reflux throughout, from 4.1 to 6.7  sec  3. Significant varicose veins communicating with the GSV, reflux of  1.4 to 3.1 sec        10/12/18 ABIs:  CONCLUSIONS:  1. Physiologic testing " with exercise indicates normal resting and  exercise KEERTHI's  2. The patient performed treadmill exercised for 5 minutes at 2.0  miles per hour at 12% elevation.  Symptoms of bilateral leg fatigue at  1:30, bilateral buttock pain at 2:00, test stopped at 2:25 due to  dyspnea.      ASSESSMENT/PLAN:  1.  Lower back pain as well as bilateral lower extremity discomfort with walking.  ABIs are normal.  He says he has seen ortho/spine and no structural abnormality in his lower back.   - We will proceed with the venous insufficiency evaluation/treatment as noted below.   - Then Dr. Wyatt recommends that he go back to ortho/spine for further evaluation.     2.  Chronic lower extremity venous insufficiency as well as varicose veins status post prior RLE venous stripping.  Venous competency study shows:   On the right, he is s/p vein stripping of the GSV. There was varicose vein from upper thigh to the ankle, up to 4mm in size with up to 5.4 sec of reflux.   On the left, the left GSV is dilated with severe reflux throughout, from 4.1 to 6.7 sec.  Significant varicose veins communicating with the GSV, reflux of 1.4 to 3.1 sec  - Bilateral thigh high compression stockings x 1 month.   - Given his prior vein stripping, and anatomy, will have him see Dr. Wyatt back in clinic in 1 month to review options of venous ablation vs venaseal.  Note to RN prepping chart, please let Dr. Wyatt know I would like to be present at that visit.     3.  History of AAA endovascular repair in 2010.  Follows with Vascular Surgery.     4.  Right common iliac artery aneurysm, followed by Vascular Surgery/IR.     5.  History of valvular heart disease. S/p AVR in 2006 with subsequent perivalvular leak and redo mechanical AVR with concomitant mechanical MVR in 2013.  Last RACHAEL was done 5/21/18 to evaluate for endocarditis, but did show normal gradients.   - Continue Coumadin    6.  Chronic diastolic heart failure.  LVEF is 55%-60%.  BP is controlled.   -  "Continue metoprolol, Lasix.     7.  Coronary artery disease with CABG (LIMA to LAD and radial graft to RCA) in 2006.   - Continue ASA, BB, statin.     8.  AV conduction disease with \"trifascicular\" block.  He is on low dose metoprolol.    - Monitor will be repeated in 3/2019 to reassess his cardiac rhythm and specifically look for intermittent AV block.     9.   PVCs.  Holter monitor showed 10% PVC burden.  He is on metoprolol 25 mg BID.    - Will not titrate given his AV conduction disease.   - Repeating monitor next year as noted above.     10.  Obstructive sleep apnea with use of CPAP.     11.  BRISENO.   - treadmill stress test to evaluate BRISENO showed no e/o chronotropic incompetence or development of high grade AV block with exertion   - GAGE is treated  - PFTs normal, has seen pulm.  Trial of Trelegy for asthma was prescribed.   - Normal valve gradients  - Preserved LVEF  - Dr. Santo recommended that when/if he needs to be off anticoagulation, we should use that time to perform a RHC.           Warren Scales PA-C    Thank you for allowing me to participate in the care of your patient.      Sincerely,     Warren Scales PA-C     [facility]    cc:   Ebenezer Wyatt MD  [address]  [place] [id number]        "

## 2018-11-01 NOTE — LETTER
2018    Tu Reyes MD, MD  [address] [place] [address]    RE: Fausto Grantth       Dear Colleague,    I had the pleasure of seeing Fausto Farr in the [facility].    VASCULAR and CARDIOLOGY CLINIC PROGRESS NOTE    DOS: 2018      Fausto Farr  : 1940, 78 year old  MRN: [id number]      History: I had the pleasure of meeting Fausto Farr today in the vascular clinic.  He was accompanied by his wife, Ritu.  He is a patient of Dr. Santo, but has also seen Dr. Vasquez () and Dr. Wyatt (vascular clinic).     Ralph is a very pleasant 78 year old man with extensive prior cardiovascular history.     He underwent 2-vessel coronary bypass grafting (LIMA to his LAD and a left radial graft to his right coronary artery) and aortic valve replacement in .  Over the years, he developed a perivalvular leak and also was found to have significant mitral stenosis.  In , he underwent redo mechanical aortic valve replacement and mechanical mitral valve replacement.  He has remained on chronic warfarin.  Followup echocardiograms have shown normal valvular gradients.     He has AV conduction disease and PVCs.  In 2018, Dr. Santo ordered a treadmill stress test to evaluate BRISENO as he wondered whether it may be related to chronotropic incompetence or development of AV block with exertion.  The patient had a stress test on 2018 where he lasted only 4 minutes and 25 seconds on a modified Waqas protocol.  He achieved a peak heart rate of only 106 beats per minute.  He stopped because of back and hip pain rather than fatigue or shortness of breath.  There was no AV block with exertion.    A Holter was done that showed frequent PVCs, burden of approximately 10%.  There were episodes of possible transient second-degree AV block and a 15-beat run of AIVR.  No high-grade bradycardia or tachycardia was noted.   He saw Dr. Vasquez 18, and  though he was noted to have conduction disease, there was no clear high-grade AV block or sxs to justify a pacemaker implantation. Dr. Vasquez recommended repeating a 10 day Zio Patch in 3/2019 to reassess his cardiac rhythm and specifically look for intermittent AV block.     He has had previous endovascular AAA repair in 2010 (by Dr. Mays from Vascular Surgery as well as IR).  His most recent vascular evaluation was aortoiliac ultrasound performed on 07/10/2018.  That ultrasound was read as showing patent [id number] stent graft with aneurysmal sac size of 7 cm AP x 6 cm transverse, previously 5.8 cm AP x 6.4 cm transverse in 2015.  There is aneurysmal dilatation of the left common iliac artery measuring up to 2.4 cm which has increased from previous ultrasound.      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  He continues to have exertional lower back pain radiating to his hips.  The discomfort starts in the coccyx and buttocks area.  It is usually predictable and is brought on by exertion.  The symptoms are usually better if he is leaning forward, especially if he is supported by a shopping cart.  He can walk with a shopping cart leaning forward in the store without any difficulty.  He denies any rest pain.  His symptoms are suggestive of neurogenic claudication but he reports that he was recently seen by his spine surgeon who performed imaging.  He was told there was no structural abnormality in his lower back.   He did undergo ABIs that showed both normal resting and exercise ABIs.   He had venous competency studies done.  No DVTs bilaterally.    On the right, he is s/p vein stripping of the GSV. There was varicose vein from upper thigh to the ankle, up to 4mm in size with up to 5.4 sec of reflux.   On the left, the left GSV is dilated with severe reflux throughout, from 4.1 to 6.7 sec.  Significant varicose veins communicating with the GSV, reflux of 1.4 to 3.1 sec    He also has  obstructive sleep apnea and is on CPAP therapy since late 2017.      For his BRISENO, Dr. Santo ordered the treadmill stress test as noted above.  Given Ralph's prior smoking history, Dr. Santo also ordered PFTS.  The PFTs were done 7/16/18 and showed normal forced vital capacity and FEV1 at 86% of predicted.  FEV1 over FVC ratio is normal at 76%. Lung volumes normal, diffusing capacity is minimally reduced at 75% of predicted.  He did see Dr. Galeana, who noted the unremarkable PFTs.  He did consider asthma may be contributing. So a trial of Trelegy was prescribed.  I do not see he followed up with Dr. Galeana.   Dr. Santo recommended that when/if he needs to be off anticoagulation, we should use that time to perform a  RHC.         Interval History:   He presents today for follow up.   We reviewed the KEERTHI and venous competency study results.     He says he previously had vein stripping of the RLE. He has varicose veins in both legs.   He used to wear thigh high compression stockings every day for years, but stopped wearing them when he retired. He is interested in trying them again.     He continues to have exertional lower back pain radiating to his hips.  The discomfort starts in the coccyx and buttocks area.  The symptoms are usually better if he is leaning forward, especially if he is supported by a shopping cart.  He can walk with a shopping cart leaning forward in the store without any difficulty.  He denies any rest pain.       ROS:  Skin:  Negative     Eyes:  Positive for glasses  ENT:  Positive for hearing loss  Respiratory:  Positive for dyspnea on exertion  Cardiovascular:    Positive for;lightheadedness;edema  Gastroenterology: Positive for    Genitourinary:  Negative    Musculoskeletal:  Positive for joint pain;back pain;arthritis  Neurologic:  Negative    Psychiatric:  not assessed    Heme/Lymph/Imm:  Positive for allergies  Endocrine:  Negative      PAST MEDICAL HISTORY:  Past Medical History:    Diagnosis Date     Abdominal pain      Abnormal ECG     RBBB, 1st degree AVB, Left axis deviation     Anemia     currently taking iron     Arrhythmia     pac, pvc     Back pain     since 1980     Bruit      CAD (coronary artery disease)      Cellulitis 10/18/12     Cellulitis 05/2018    GrpB strep LLE cellulitis  negative RACHAEL for veg     Contact dermatitis and other eczema, due to unspecified cause      Diaphragmatic hernia without mention of obstruction or gangrene      Gastric ulcer      Glucose intolerance (impaired glucose tolerance)      Heart murmur 9/16/13    valvular heart disease     Hyperlipidaemia      Hypertension 8/6/13     Lumbago      Malaise and fatigue      Metabolic syndrome      Mobitz (type) I (Wenckebach's) atrioventricular block     and RBBB     Nocturia 10/18/12     Nocturia      Nonallopathic lesion of cervical region      Nonallopathic lesion of lumbar region      Nonallopathic lesion of pelvic region, not elsewhere classified      Nonallopathic lesion of rib cage      Nonallopathic lesion of sacral region      Paroxysmal atrial fibrillation (H) 10/18/12     Prostate cancer (H) 2008    radiation seed, XRT      PVD (peripheral vascular disease) (H)      Rotator cuff strain     and sprain     S/P aortic valve replacement 2006    developed perivalve leak and MS, therefore redo surg 2013     S/P CABG (coronary artery bypass graft) 2006    Lima-Lad, RA-Rca     Sciatica of left side     since 2000     Sepsis due to group B Streptococcus (H) 5/19/2018     Sleep apnea     pt declined cpap     Ulcerative colitis (H)      Vitamin D deficiency        PAST SURGICAL HISTORY:  Past Surgical History:   Procedure Laterality Date     AORTIC VALVE REPLACEMENT  1/3/06    redo AVR SJM 21mm and SJM MVR 27mm in 2013SJM 21([id number]):AVR, SJM 27 :MVR-     ARTHROPLASTY KNEE      right knee     BACK SURGERY  Oct 2015    Fusion L4-5, laminectomy L2, L3     BYPASS GRAFT ARTERY CORONARY  10/2013     reimplantation of radial artery graft to RCA     C CABG, VEIN, TWO  1/3/06    Left radial to RCA, LIMA to LAD (RA to RCA reimplanted at time of 2013 surg)     CARDIAC CATHERIZATION  11/2005    Stent placed to RCA     CARDIAC CATHERIZATION  04/2013    Occluded RCA, patent LIMA to LAD and radial graft to PDA     CARPAL TUNNEL RELEASE RT/LT  1994     COLONOSCOPY  8-22-11     CYSTOSCOPY FLEXIBLE  10/16/2013    Procedure: CYSTOSCOPY FLEXIBLE;  FLEXIBLE CYSTOSCOPY / DILATION OF URETHRA / INSERTION OF LESLIE;  Surgeon: Cooper Wallace MD;  Location: [facility] OR     ENDOVASCULAR REPAIR ANEURYSM ABDOMINAL AORTA  2006     ENDOVASCULAR REPAIR, INFRARENAL ABDOMINAL AORTIC ANEURYSM/DISSECTION; MODULAR BIFURCATED PROSTHESIS      AAA repair endovascular     ENT SURGERY       GENITOURINARY SURGERY  6/16/08    radioactive seeding     HEAD & NECK SURGERY  1997    vocal cord polypectomy     KNEE SURGERY  2001 Right knee arthroscopy     OPTICAL TRACKING SYSTEM FUSION SPINE POSTERIOR LUMBAR THREE+ LEVELS N/A 10/29/2015    Procedure: OPTICAL TRACKING SYSTEM FUSION SPINE POSTERIOR LUMBAR THREE+ LEVELS;  Surgeon: Walt Garcia MD;  Location: [facility] OR     PROSTATE SURGERY  06/16/2008 Radioactive seeding     PROSTATE SURGERY      radioactive seeding 6/16/08     REPAIR ANEURYSM ABDOMINAL AORTA  06/08     REPAIR VALVE MITRAL  10/16/2013    SJM 21([id number]):AVR, SJM 27 :MVRProcedure: REPAIR VALVE MITRAL;  REDO STERNOTOMY/REDO AORTIC VALVE REPLACEMENT/ MITRAL VALVE REPLACEMENT/REIMPLANTATION OF RIGHT CORONARY ARTERY BYPASS WITH RACHAEL ( ON PUMP);  Surgeon: Viet Singh MD;  Location:  OR     REPLACE VALVE AORTIC  10/16/2013    Procedure: REPLACE VALVE AORTIC;;  Surgeon: Viet Singh MD;  Location:  OR     SURGERY GENERAL IP CONSULT  05/2008 Excision aneurysm abdominal aorta     SURGERY GENERAL IP CONSULT  1997 Vocal cord polypectomy     VASCULAR SURGERY  1968, 1993     varicose vein stripping       SOCIAL  HISTORY:  Social History     Social History     Marital status:      Spouse name: N/A     Number of children: N/A     Years of education: N/A     Social History Main Topics     Smoking status: Former Smoker     Packs/day: 1.00     Years: 40.00     Quit date: 10/23/2002     Smokeless tobacco: Never Used     Alcohol use Yes      Comment: a couple beers per week (socially)     Drug use: No     Sexual activity: No     Other Topics Concern     Parent/Sibling W/ Cabg, Mi Or Angioplasty Before 65f 55m? Yes     Brother had bypass at 55     Caffeine Concern No     6-8 cups of half and half per day     Special Diet No     Exercise No     Social History Narrative       FAMILY HISTORY:  Family History   Problem Relation Age of Onset     Coronary Artery Disease Father      CABG     HEART DISEASE Father      Pacemaker     Other Cancer Daughter      HEART DISEASE Brother      Other - See Comments Grandchild        MEDS:   Current Outpatient Prescriptions on File Prior to Visit:  aspirin 81 MG EC tablet Take 1 tablet (81 mg) by mouth daily   betamethasone valerate (VALISONE) 0.1 % lotion APPLY TOPICALLY TWICE DAILY PRN   ezetimibe (ZETIA) 10 MG tablet Take 1 tablet (10 mg) by mouth daily   fluocinonide (LIDEX) 0.05 % ointment 2- 30 gram tubes.  Apply twice a day as needed.   furosemide (LASIX) 40 MG tablet Take 0.5 tablets (20 mg) by mouth daily   HERBALS White willow bark, Turmeric, Ginger, botswellia & Yucca mixed powder(1.5 tsp mixed in cranberry juice or orange juice) every day   mesalamine (ASACOL HD) 800 MG EC tablet Take 1 tablet (800 mg) by mouth 2 times daily   metoprolol tartrate (LOPRESSOR) 25 MG tablet Take 1 tablet (25 mg) by mouth 2 times daily   Potassium Chloride ER 20 MEQ TBCR Take 1 tablet (20 mEq) by mouth daily   rosuvastatin (CRESTOR) 40 MG tablet Take 1 tablet (40 mg) by mouth daily   tamsulosin (FLOMAX) 0.4 MG capsule Take 1 capsule (0.4 mg) by mouth daily   warfarin (COUMADIN) 5 MG tablet Take 1 1/2  "tablets (7.5 mg) by mouth TWTFSS; 1 tablet (5 mg) on Mondays OR as directed by INR Clinic     No current facility-administered medications on file prior to visit.     ALLERGIES:   Allergies   Allergen Reactions     Bees Anaphylaxis       PHYSICAL EXAM:  Vitals: /64 (BP Location: Right arm, Patient Position: Chair, Cuff Size: Adult Large)  Pulse 64  Ht 1.664 m (5' 5.5\")  Wt 103 kg (227 lb)  BMI 37.2 kg/m2  Constitutional:  cooperative;well developed;well nourished;in no acute distress        Skin:  warm and dry to the touch;no apparent skin lesions or masses noted        Head:  normocephalic        Eyes:  conjunctivae and lids unremarkable        ENT:  no pallor or cyanosis        Neck:  JVP normal;carotid pulses are full and equal bilaterally        Respiratory:  clear to auscultation        Cardiac: regular rhythm occasional premature beats   crisp prosthetic valve sounds            GI:  abdomen soft;BS normoactive obese      Vascular:                                        Extremities and Musculoskeletal:           Neurological:  no gross motor deficits;affect appropriate            LABS/DATA:  I reviewed the following:  10/12/18 venous comp studies:  CONCLUSION:  1. Right lower extremity veins and Limited iliac vein:  1. Normal compressibility of the deep veins of the right lower  extremity was demonstrated without signs of venous thrombosis.  2. Right GSV is not visualized from the thigh to ankle, s/p stripping  3. Varicose vein from upper thigh to the ankle, up to 4mm in size with  up to 5.4 sec of reflux     Left lower extremity veins and limited iliac vein:  1. Normal compressibility of the deep veins of the right lower  extremity was demonstrated without signs of venous thrombosis.  2. Left GSV is dilated with severe reflux throughout, from 4.1 to 6.7  sec  3. Significant varicose veins communicating with the GSV, reflux of  1.4 to 3.1 sec        10/12/18 ABIs:  CONCLUSIONS:  1. Physiologic testing " with exercise indicates normal resting and  exercise KEERTHI's  2. The patient performed treadmill exercised for 5 minutes at 2.0  miles per hour at 12% elevation.  Symptoms of bilateral leg fatigue at  1:30, bilateral buttock pain at 2:00, test stopped at 2:25 due to  dyspnea.      ASSESSMENT/PLAN:  1.  Lower back pain as well as bilateral lower extremity discomfort with walking.  ABIs are normal.  He says he has seen ortho/spine and no structural abnormality in his lower back.   - We will proceed with the venous insufficiency evaluation/treatment as noted below.   - Then Dr. Wyatt recommends that he go back to ortho/spine for further evaluation.     2.  Chronic lower extremity venous insufficiency as well as varicose veins status post prior RLE venous stripping.  Venous competency study shows:   On the right, he is s/p vein stripping of the GSV. There was varicose vein from upper thigh to the ankle, up to 4mm in size with up to 5.4 sec of reflux.   On the left, the left GSV is dilated with severe reflux throughout, from 4.1 to 6.7 sec.  Significant varicose veins communicating with the GSV, reflux of 1.4 to 3.1 sec  - Bilateral thigh high compression stockings x 1 month.   - Given his prior vein stripping, and anatomy, will have him see Dr. Wyatt back in clinic in 1 month to review options of venous ablation vs venaseal.  Note to RN prepping chart, please let Dr. Wyatt know I would like to be present at that visit.     3.  History of AAA endovascular repair in 2010.  Follows with Vascular Surgery.     4.  Right common iliac artery aneurysm, followed by Vascular Surgery/IR.     5.  History of valvular heart disease. S/p AVR in 2006 with subsequent perivalvular leak and redo mechanical AVR with concomitant mechanical MVR in 2013.  Last RACHAEL was done 5/21/18 to evaluate for endocarditis, but did show normal gradients.   - Continue Coumadin    6.  Chronic diastolic heart failure.  LVEF is 55%-60%.  BP is controlled.   -  "Continue metoprolol, Lasix.     7.  Coronary artery disease with CABG (LIMA to LAD and radial graft to RCA) in 2006.   - Continue ASA, BB, statin.     8.  AV conduction disease with \"trifascicular\" block.  He is on low dose metoprolol.    - Monitor will be repeated in 3/2019 to reassess his cardiac rhythm and specifically look for intermittent AV block.     9.   PVCs.  Holter monitor showed 10% PVC burden.  He is on metoprolol 25 mg BID.    - Will not titrate given his AV conduction disease.   - Repeating monitor next year as noted above.     10.  Obstructive sleep apnea with use of CPAP.     11.  BRISENO.   - treadmill stress test to evaluate BRISENO showed no e/o chronotropic incompetence or development of high grade AV block with exertion   - GAGE is treated  - PFTs normal, has seen pulm.  Trial of Trelegy for asthma was prescribed.   - Normal valve gradients  - Preserved LVEF  - Dr. Santo recommended that when/if he needs to be off anticoagulation, we should use that time to perform a RHC.         Thank you for allowing me to participate in the care of your patient.    Sincerely,     Warren Scales PA-C     [facility] [place]    "

## 2018-11-01 NOTE — MR AVS SNAPSHOT
After Visit Summary   11/1/2018    Fausto Farr    MRN: [id number]           Patient Information     Date Of Birth          1940        Visit Information        Provider Department      11/1/2018 3:10 PM Warren Scales PA-C [facility]        Today's Diagnoses     Venous (peripheral) insufficiency    -  1    Varicose veins of both lower extremities, unspecified whether complicated           Follow-ups after your visit        Additional Services     Follow-Up with Vascular Cardiologist       Follow up after compression stockings                  Your next 10 appointments already scheduled     Nov 26, 2018  9:30 AM CST   Anticoagulation Visit with  ANTICOAGULATION CLINIC   [facility] ([facility])    [address]  [address]  [place] [id number] [phone]            Dec 04, 2018  9:00 AM CST   Return Visit with Ebenezer Wyatt MD   [facility] ([facility])    [address]  [place] [id number] [phone]              Future tests that were ordered for you today     Open Future Orders        Priority Expected Expires Ordered    Follow-Up with Vascular Cardiologist Routine 12/4/2018 11/1/2019 11/1/2018            Who to contact     If you have questions or need follow up information about today's clinic visit or your schedule please contact [facility] directly at [phone].  Normal or non-critical lab and imaging results will be communicated to you by MyChart, letter or phone within 4 business days after the clinic has received the results. If you do not hear from us within 7 days, please contact the clinic through MyChart or phone. If you have a critical or abnormal lab result, we will notify you by phone as soon as possible.  Submit refill requests through [employer] or call your  "pharmacy and they will forward the refill request to us. Please allow 3 business days for your refill to be completed.          Additional Information About Your Visit        Care EveryWhere ID     This is your Care EveryWhere ID. This could be used by other organizations to access your [facility] medical records  [id number]        Your Vitals Were     Pulse Height BMI (Body Mass Index)             64 1.664 m (5' 5.5\") 37.2 kg/m2          Blood Pressure from Last 3 Encounters:   11/01/18 118/64   10/19/18 115/56   10/15/18 128/59    Weight from Last 3 Encounters:   11/01/18 103 kg (227 lb)   10/19/18 102.5 kg (226 lb)   10/01/18 102.5 kg (226 lb)              We Performed the Following     Follow-Up with Cardiac Advanced Practice Provider          Today's Medication Changes          These changes are accurate as of 11/1/18  4:14 PM.  If you have any questions, ask your nurse or doctor.               Start taking these medicines.        Dose/Directions    COMPRESSION STOCKINGS   Used for:  Varicose veins of both lower extremities, unspecified whether complicated, Venous (peripheral) insufficiency   Started by:  Warren Scales PA-C        Dose:  1 each   1 each daily   Quantity:  1 each   Refills:  1            Where to get your medicines      Some of these will need a paper prescription and others can be bought over the counter.  Ask your nurse if you have questions.     Bring a paper prescription for each of these medications     COMPRESSION STOCKINGS                Primary Care Provider Office Phone # Fax #    Tu Reyes [phone]       [facility] [place] [address]        Equal Access to Services     [facility] AH: Josi riley Sobridger, waaxda luqadaha, qaybta kaalmada sujey, denny goldberg. So [facility] [phone].    ATENCIÓN: Si habla español, tiene a brown disposición servicios gratuitos de asistencia lingüística. Llame al " [phone].    We comply with applicable federal civil rights laws and [place] laws. We do not discriminate on the basis of race, color, national origin, age, disability, sex, sexual orientation, or gender identity.            Thank you!     Thank you for choosing [facility]  for your care. Our goal is always to provide you with excellent care. Hearing back from our patients is one way we can continue to improve our services. Please take a few minutes to complete the written survey that you may receive in the mail after your visit with us. Thank you!             Your Updated Medication List - Protect others around you: Learn how to safely use, store and throw away your medicines at www.disposemymeds.org.          This list is accurate as of 11/1/18  4:14 PM.  Always use your most recent med list.                   Brand Name Dispense Instructions for use Diagnosis    aspirin 81 MG EC tablet     1 tablet    Take 1 tablet (81 mg) by mouth daily        betamethasone valerate 0.1 % lotion    VALISONE    60 mL    APPLY TOPICALLY TWICE DAILY PRN    Eczema, unspecified type       COMPRESSION STOCKINGS     1 each    1 each daily    Varicose veins of both lower extremities, unspecified whether complicated, Venous (peripheral) insufficiency       ezetimibe 10 MG tablet    ZETIA    90 tablet    Take 1 tablet (10 mg) by mouth daily    Mixed hyperlipidemia       fluocinonide 0.05 % ointment    LIDEX    60 g    2- 30 gram tubes.  Apply twice a day as needed.    Eczema, unspecified type       furosemide 40 MG tablet    LASIX    45 tablet    Take 0.5 tablets (20 mg) by mouth daily    Chronic diastolic congestive heart failure (H)       HERBALS     60 each    White willow bark, Turmeric, Ginger, botswellia & Yucca mixed powder(1.5 tsp mixed in cranberry juice or orange juice) every day        mesalamine 800 MG EC tablet    ASACOL HD    180 tablet    Take 1 tablet (800 mg) by mouth 2  times daily    Ulcerative proctitis without complication (H)       metoprolol tartrate 25 MG tablet    LOPRESSOR    180 tablet    Take 1 tablet (25 mg) by mouth 2 times daily    Coronary artery disease involving coronary bypass graft 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       Potassium Chloride ER 20 MEQ Tbcr     30 tablet    Take 1 tablet (20 mEq) by mouth daily    Vitamin D deficiency, Left leg cellulitis, Essential hypertension, benign, Atrial fibrillation, unspecified type (H)       rosuvastatin 40 MG tablet    CRESTOR    90 tablet    Take 1 tablet (40 mg) by mouth daily    Hyperlipidemia, unspecified hyperlipidemia type       tamsulosin 0.4 MG capsule    FLOMAX    90 capsule    Take 1 capsule (0.4 mg) by mouth daily    Urinary retention       warfarin 5 MG tablet    COUMADIN    135 tablet    Take 1 1/2 tablets (7.5 mg) by mouth TWTFSS; 1 tablet (5 mg) on Mondays OR as directed by INR Clinic    Long-term (current) use of anticoagulants, S/P aortic valve replacement

## 2018-11-21 ENCOUNTER — OFFICE VISIT (OUTPATIENT)
Dept: CARDIOLOGY | Facility: CLINIC | Age: 78
End: 2018-11-21
Payer: MEDICARE

## 2018-11-21 VITALS
DIASTOLIC BLOOD PRESSURE: 58 MMHG | HEART RATE: 60 BPM | BODY MASS INDEX: 36.32 KG/M2 | SYSTOLIC BLOOD PRESSURE: 110 MMHG | HEIGHT: 66 IN | WEIGHT: 226 LBS

## 2018-11-21 DIAGNOSIS — I87.8 VENOUS INTERMITTENT CLAUDICATION: ICD-10-CM

## 2018-11-21 DIAGNOSIS — I83.813 VARICOSE VEINS OF BOTH LOWER EXTREMITIES WITH PAIN: ICD-10-CM

## 2018-11-21 DIAGNOSIS — I83.893 SYMPTOMATIC VARICOSE VEINS OF BOTH LOWER EXTREMITIES: Primary | ICD-10-CM

## 2018-11-21 DIAGNOSIS — I87.2 VENOUS (PERIPHERAL) INSUFFICIENCY: ICD-10-CM

## 2018-11-21 DIAGNOSIS — I83.93 VARICOSE VEINS OF BOTH LOWER EXTREMITIES, UNSPECIFIED WHETHER COMPLICATED: ICD-10-CM

## 2018-11-21 PROCEDURE — 99214 OFFICE O/P EST MOD 30 MIN: CPT | Performed by: INTERNAL MEDICINE

## 2018-11-21 NOTE — LETTER
11/21/2018    Tu Reyes MD, MD  [address] [place] [address]    RE: Fausto Farr       Dear Colleague,    I had the pleasure of seeing Fausto Farr in the [facility].    Vascular Cardiology Consultation      Fausto Farr MRN# [id number]   YOB: 1940 Age: 78 year old   Date of Visit 11/21/2018     Reason for consult:  Peripheral venous hypertension, venous claudication           Assessment and Plan:     1. Bilateral lower extremity discomfort with exertion, patient describes this as heavy and tight with normal rest and limited exercise KEERTHI but severe venous dilatation and incompetence consistent with venous claudication    We discussed that his symptoms may be from venous claudication.  He does have profound peripheral venous hypertension and reflux that are consistent with his symptoms.    Due to anticoagulation for mechanical mitral and aortic valves, would limit the amount of sclerotherapy he receives at one setting.    Plan on treating the left lower extremity first with VenaSeal closure of the left GSV with sclerotherapy and phlebectomy of the resultant varicosities.  We will schedule this for January.    Future treatment of the right lower extremity with extensive sclerotherapy and phlebectomy of the varicose veins.  Will likely tilt head up during the initial phase of sclerotherapy to prevent washout and increase the dwell time of the sclerotherapy in the varicose veins.  This will be somewhat of a challenging procedure as we do not have the ability to reduce the entry point high pressure with RF or glue in the varicose vein itself.  We may utilize manual compression proximally while we do the phlebectomy distally.  I did warn him that there would be quite a bit of bruising and bleeding with this sclerotherapy and phlebectomy.  We will schedule this second procedure in March when he returns from his vacation.      This note was  transcribed using electronic voice recognition software and may contain typographical errors.             Chief Complaint:   Consult (Chronic systolic congestive heart failure )           History of Present Illness:   This patient is a very pleasant 78 year old male that I am seeing in vascular clinic to follow-up on abnormal venous competency testing.  The patient also had exercise ABIs on 10/12/2018 with normal rest and exercise KEERTHI, however patient stopped early on the protocol due to dyspnea.  He did have symptoms of bilateral leg fatigue and buttock pain prior to stopping.    On his venous competency test, his right lower extremity has absent GSV secondary to previous vein stripping.  He has developed a severely refluxing 5.4-second, 4.6 mm large bifurcating varicose vein from pelvic region that traverses down his leg and reflexes all the way down to the ankle.    His left lower extremity has severe reflux in his dilated greater saphenous vein (4.1-5.3 seconds reflux, 9.6-12.6 mm) with resultant large varicose veins with severe reflux (6.9 mm, 3.1 seconds) from thigh to ankle.    His main symptoms are exertional thigh and leg heaviness that affects his quality of life.  With the absence of obstructive peripheral arterial disease, his symptoms could be consistent with venous claudication.  Certainly the amount of reflux bilaterally could cause such symptoms.    He also has some soreness of right greater than left varicose veins, he has had some intermittent left lower extremity swelling and bilateral bruising, but overall the skin changes are mild.  The edema is fairly well controlled with compression stockings.  He has been compliant on compression stockings for greater than 1 month and has worn bilateral stockings previously for a number of years after his vein stripping.  He does not notice any improvement in his exertional leg discomfort with the compression stockings.    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  He is anticoagulated for that.  He also has history of AAA repair.    History of ulcer: No  History of edema: Yes, mild  History of compression stockings: Yes, years previously, currently greater than 1 month  History of leg discomfort requiring analgesics: Yes leg discomfort, no analgesics  History of itching, skin change or restless legs: No         Physical Exam:       Vitals: /58  Pulse 60  Ht 1.664 m (5' 5.5\")  Wt 102.5 kg (226 lb)  BMI 37.04 kg/m2  Constitutional:  cooperative;well developed;well nourished;in no acute distress        Skin:  warm and dry to the touch;no apparent skin lesions or masses noted        Head:  normocephalic        Eyes:  conjunctivae and lids unremarkable        ENT:  no pallor or cyanosis        Neck:  JVP normal;carotid pulses are full and equal bilaterally        Chest:  clear to auscultation        Cardiac: regular rhythm occasional premature beats   crisp prosthetic valve sounds            Abdomen:    obese      Extremities and Back:        Bilateral lower extremity varicose veins    Neurological:  no gross motor deficits;affect appropriate                      Past Medical History:   I have reviewed this patient's past medical history  Past Medical History:   Diagnosis Date     Abdominal pain      Abnormal ECG     RBBB, 1st degree AVB, Left axis deviation     Anemia     currently taking iron     Arrhythmia     pac, pvc     Back pain     since 1980     BPH (benign prostatic hyperplasia)      Bruit      CAD (coronary artery disease)      Cellulitis 10/18/12     Cellulitis 05/2018    GrpB strep LLE cellulitis  negative RACHAEL for veg     Chronic venous insufficiency     bilat lower extremities     Contact dermatitis and other eczema, due to unspecified cause      Diaphragmatic hernia without mention of obstruction or gangrene      Diastolic HF (heart failure) (H)      Gastric ulcer      Glucose intolerance (impaired glucose tolerance)      Heart " murmur 9/16/13    valvular heart disease     Hyperlipidaemia      Hypertension 8/6/13     Lumbago      Malaise and fatigue      Metabolic syndrome      Mobitz (type) I (Wenckebach's) atrioventricular block     and RBBB     Nocturia 10/18/12     Nocturia      Nonallopathic lesion of cervical region      Nonallopathic lesion of lumbar region      Nonallopathic lesion of pelvic region, not elsewhere classified      Nonallopathic lesion of rib cage      Nonallopathic lesion of sacral region      GAGE (obstructive sleep apnea)     pt declined cpap     Paroxysmal atrial fibrillation (H) 10/18/12     Prostate cancer (H) 2008    radiation seed, XRT      PVD (peripheral vascular disease) (H)      RBBB      Rotator cuff strain     and sprain     S/P AAA repair      S/P aortic valve replacement 2006    developed perivalve leak and MS, therefore redo surg 2013     S/P CABG (coronary artery bypass graft) 2006    Lima-Lad, RA-Rca     Sciatica of left side     since 2000     Sepsis due to group B Streptococcus (H) 5/19/2018     Ulcerative colitis (H)      Varicose veins of bilateral lower extremities with other complications     s/p RLE vein stripping     Vitamin D deficiency              Past Surgical History:   I have reviewed this patient's past surgical history  Past Surgical History:   Procedure Laterality Date     AORTIC VALVE REPLACEMENT  1/3/06    redo AVR SJM 21mm and SJM MVR 27mm in 2013SJM 21([id number]):AVR, SJM 27 :MVR-     ARTHROPLASTY KNEE      right knee     BACK SURGERY  Oct 2015    Fusion L4-5, laminectomy L2, L3     BYPASS GRAFT ARTERY CORONARY  10/2013    reimplantation of radial artery graft to RCA     C CABG, VEIN, TWO  1/3/06    Left radial to RCA, LIMA to LAD (RA to RCA reimplanted at time of 2013 surg)     CARDIAC CATHERIZATION  11/2005    Stent placed to RCA     CARDIAC CATHERIZATION  04/2013    Occluded RCA, patent LIMA to LAD and radial graft to PDA     CARPAL TUNNEL RELEASE RT/LT  1994      COLONOSCOPY  8-22-11     CYSTOSCOPY FLEXIBLE  10/16/2013    Procedure: CYSTOSCOPY FLEXIBLE;  FLEXIBLE CYSTOSCOPY / DILATION OF URETHRA / INSERTION OF LESLIE;  Surgeon: Cooper Wallace MD;  Location: [facility] OR     ENDOVASCULAR REPAIR ANEURYSM ABDOMINAL AORTA  2006     ENDOVASCULAR REPAIR, INFRARENAL ABDOMINAL AORTIC ANEURYSM/DISSECTION; MODULAR BIFURCATED PROSTHESIS      AAA repair endovascular     ENT SURGERY       GENITOURINARY SURGERY  6/16/08    radioactive seeding     HEAD & NECK SURGERY  1997    vocal cord polypectomy     KNEE SURGERY  2001 Right knee arthroscopy     OPTICAL TRACKING SYSTEM FUSION SPINE POSTERIOR LUMBAR THREE+ LEVELS N/A 10/29/2015    Procedure: OPTICAL TRACKING SYSTEM FUSION SPINE POSTERIOR LUMBAR THREE+ LEVELS;  Surgeon: Walt Garcia MD;  Location: [facility] OR     PROSTATE SURGERY  06/16/2008 Radioactive seeding     PROSTATE SURGERY      radioactive seeding 6/16/08     REPAIR ANEURYSM ABDOMINAL AORTA  06/08     REPAIR VALVE MITRAL  10/16/2013    SJM 21([id number] [id number]):AVR, SJM 27 :MVRProcedure: REPAIR VALVE MITRAL;  REDO STERNOTOMY/REDO AORTIC VALVE REPLACEMENT/ MITRAL VALVE REPLACEMENT/REIMPLANTATION OF RIGHT CORONARY ARTERY BYPASS WITH RACHAEL ( ON PUMP);  Surgeon: Viet Singh MD;  Location:  OR     REPLACE VALVE AORTIC  10/16/2013    Procedure: REPLACE VALVE AORTIC;;  Surgeon: Viet Singh MD;  Location:  OR     SURGERY GENERAL IP CONSULT  05/2008 Excision aneurysm abdominal aorta     SURGERY GENERAL IP CONSULT  1997 Vocal cord polypectomy     VASCULAR SURGERY  1968, 1993     varicose vein stripping               Social History:   I have reviewed this patient's social history  Social History   Substance Use Topics     Smoking status: Former Smoker     Packs/day: 1.00     Years: 40.00     Quit date: 10/23/2002     Smokeless tobacco: Never Used     Alcohol use Yes      Comment: a couple beers per week (socially)             Family History:   I have reviewed this patient's  family history  Family History   Problem Relation Age of Onset     Coronary Artery Disease Father      CABG     HEART DISEASE Father      Pacemaker     Other Cancer Daughter      HEART DISEASE Brother      Other - See Comments Grandchild              Allergies:     Allergies   Allergen Reactions     Bees Anaphylaxis             Medications:   I have reviewed this patient's current medications  Current Outpatient Prescriptions   Medication Sig Dispense Refill     aspirin 81 MG EC tablet Take 1 tablet (81 mg) by mouth daily 1 tablet 0     betamethasone valerate (VALISONE) 0.1 % lotion APPLY TOPICALLY TWICE DAILY PRN 60 mL 3     COMPRESSION STOCKINGS 1 each daily 1 each 1     ezetimibe (ZETIA) 10 MG tablet Take 1 tablet (10 mg) by mouth daily 90 tablet 3     fluocinonide (LIDEX) 0.05 % ointment 2- 30 gram tubes.  Apply twice a day as needed. 60 g 2     furosemide (LASIX) 40 MG tablet Take 0.5 tablets (20 mg) by mouth daily 45 tablet 3     HERBALS White willow bark, Turmeric, Ginger, botswellia & Yucca mixed powder(1.5 tsp mixed in cranberry juice or orange juice) every day 60 each 11     mesalamine (ASACOL HD) 800 MG EC tablet Take 1 tablet (800 mg) by mouth 2 times daily 180 tablet 3     metoprolol tartrate (LOPRESSOR) 25 MG tablet Take 1 tablet (25 mg) by mouth 2 times daily 180 tablet 3     Potassium Chloride ER 20 MEQ TBCR Take 1 tablet (20 mEq) by mouth daily 30 tablet 1     rosuvastatin (CRESTOR) 40 MG tablet Take 1 tablet (40 mg) by mouth daily 90 tablet 3     tamsulosin (FLOMAX) 0.4 MG capsule Take 1 capsule (0.4 mg) by mouth daily 90 capsule 3     warfarin (COUMADIN) 5 MG tablet Take 1 1/2 tablets (7.5 mg) by mouth TWTFSS; 1 tablet (5 mg) on Mondays OR as directed by INR Clinic 135 tablet 3               Review of Systems:   Review of Systems:  11 point review of systems performed and negative except as for HPI and PMH              Data:   All laboratory data reviewed  Lab Results   Component Value Date     CHOL 128 07/12/2018     Lab Results   Component Value Date    HDL 36 07/12/2018     Lab Results   Component Value Date    LDL 59 07/12/2018     Lab Results   Component Value Date    TRIG 165 07/12/2018     Lab Results   Component Value Date    CHOLHDLRATIO 3.6 06/03/2015     TSH   Date Value Ref Range Status   05/08/2018 0.96 0.40 - 4.00 mU/L Final     Last Basic Metabolic Panel:  Lab Results   Component Value Date     07/12/2018      Lab Results   Component Value Date    POTASSIUM 4.0 07/12/2018     Lab Results   Component Value Date    CHLORIDE 106 07/12/2018     Lab Results   Component Value Date    AWILDA 9.0 07/12/2018     Lab Results   Component Value Date    CO2 29 07/12/2018     Lab Results   Component Value Date    BUN 15 07/12/2018     Lab Results   Component Value Date    CR 1.01 07/12/2018     Lab Results   Component Value Date     07/12/2018     Lab Results   Component Value Date    WBC 10.1 05/31/2018     Lab Results   Component Value Date    RBC 4.68 05/31/2018     Lab Results   Component Value Date    HGB 14.0 05/31/2018     Lab Results   Component Value Date    HCT 43.2 05/31/2018     Lab Results   Component Value Date    MCV 92 05/31/2018     Lab Results   Component Value Date    MCH 29.9 05/31/2018     Lab Results   Component Value Date    MCHC 32.4 05/31/2018     Lab Results   Component Value Date    RDW 13.3 05/31/2018     Lab Results   Component Value Date     05/31/2018       Thank you for allowing me to participate in the care of your patient.    Sincerely,     Biju Whitman MD     [facility]

## 2018-11-21 NOTE — MR AVS SNAPSHOT
After Visit Summary   11/21/2018    Fausto Farr    MRN: [id number]           Patient Information     Date Of Birth          1940        Visit Information        Provider Department      11/21/2018 8:45 AM Biju Whitman MD [facility]        Today's Diagnoses     Venous intermittent claudication    -  1    Symptomatic varicose veins of both lower extremities        Varicose veins of both lower extremities, unspecified whether complicated        Venous (peripheral) insufficiency          Care Instructions    Left leg vein closure in January  Right leg varicose vein treatment in March.          Follow-ups after your visit        Your next 10 appointments already scheduled     Nov 27, 2018  3:00 PM CST   Anticoagulation Visit with  ANTICOAGULATION CLINIC   [facility] ([facility]    [address]  [address]  [place] [address]   [phone]            Jan 08, 2019  2:00 PM CST   US ENDOVENOUS ABLATION THERAPY INCOMPETENT VEIN LT with SUVUS1   [facility] [place] ([facility])    [address]  [place] [address]   [phone]            Fly 10, 2019 11:30 AM CST   US LOWER EXTREMITY VENOUS DUPLEX LEFT with SUVUS1   [facility] [place] ([facility])    [address]  [place] [address]   [phone]           How do I prepare for my exam? (Food and drink instructions) No Food and Drink Restrictions.  How do I prepare for my exam? (Other instructions) You do not need to do anything special to prepare for your exam.  What should I wear: Wear comfortable clothes.  How long does the exam take: Most ultrasounds take 30 to 60 minutes.  What should I bring: Bring a list of your medicines, including vitamins, minerals and over-the-counter drugs. It is safest to leave personal items at home.   Do I need a :  No  is needed.  What do I need to tell my doctor: Tell your doctor about any allergies you may have.  What should I do after the exam: No restrictions, You may resume normal activities.  What is this test: An ultrasound uses sound waves to make pictures of the body. Sound waves do not cause pain. The only discomfort may be the pressure of the wand against your skin or full bladder.  Who should I call with questions: If you have any questions, please call the Imaging Department where you will have your exam. Directions, parking instructions, and other information is available on our website, [email or web address].              Future tests that were ordered for you today     Open Future Orders        Priority Expected Expires Ordered    US Endoveneous Ablat Thpy Incmpnt 1Vein L Routine 11/28/2018 11/21/2019 11/21/2018    US Stab Phlebectomy 10-20 Stabs Routine 11/28/2018 11/21/2019 11/21/2018    US Sclerotherapy Lower Extremity Multiple Vein Left Routine 11/28/2018 11/21/2019 11/21/2018    US Lower Extremity Venous Duplex Left Routine 11/28/2018 11/21/2019 11/21/2018    US Sclerotherapy Lower Extremity Multiple Vein Right Routine 3/12/2019 11/21/2019 11/21/2018    US Lower Extremity Venous Duplex Right Routine 3/28/2019 11/21/2019 11/21/2018            Who to contact     If you have questions or need follow up information about today's clinic visit or your schedule please contact [facility] directly at [phone].  Normal or non-critical lab and imaging results will be communicated to you by MyChart, letter or phone within 4 business days after the clinic has received the results. If you do not hear from us within 7 days, please contact the clinic through MyChart or phone. If you have a critical or abnormal lab result, we will notify you by phone as soon as possible.  Submit refill requests through [facility] or call your pharmacy and they will  "forward the refill request to us. Please allow 3 business days for your refill to be completed.          Additional Information About Your Visit        Care EveryWhere ID     This is your Care EveryWhere ID. This could be used by other organizations to access your [facility] medical records  [id number]        Your Vitals Were     Pulse Height BMI (Body Mass Index)             60 1.664 m (5' 5.5\") 37.04 kg/m2          Blood Pressure from Last 3 Encounters:   11/21/18 110/58   11/01/18 118/64   10/19/18 115/56    Weight from Last 3 Encounters:   11/21/18 102.5 kg (226 lb)   11/01/18 103 kg (227 lb)   10/19/18 102.5 kg (226 lb)              We Performed the Following     Follow-Up with Vascular Cardiologist        Primary Care Provider Office Phone # Fax #    Tu Reyes [phone] [address] [place] [address]        Equal Access to Services     [facility]: Hadii aad ku hadasho Soomaali, waaxda luqadaha, qaybta kaalmada adeegyada, waxay silvinoin hayelisabethn moira blake . So [facility] [phone].    ATENCIÓN: Si habla español, tiene a brown disposición servicios gratuitos de asistencia lingüística. Llame al [phone].    We comply with applicable federal civil rights laws and [place] laws. We do not discriminate on the basis of race, color, national origin, age, disability, sex, sexual orientation, or gender identity.            Thank you!     Thank you for choosing [facility]  for your care. Our goal is always to provide you with excellent care. Hearing back from our patients is one way we can continue to improve our services. Please take a few minutes to complete the written survey that you may receive in the mail after your visit with us. Thank you!             Your Updated Medication List - Protect others around you: Learn how to safely use, store and throw away your medicines at www.disposemymeds.org.          This list " is accurate as of 11/21/18  9:44 AM.  Always use your most recent med list.                   Brand Name Dispense Instructions for use Diagnosis    aspirin 81 MG EC tablet     1 tablet    Take 1 tablet (81 mg) by mouth daily        betamethasone valerate 0.1 % lotion    VALISONE    60 mL    APPLY TOPICALLY TWICE DAILY PRN    Eczema, unspecified type       COMPRESSION STOCKINGS     1 each    1 each daily    Varicose veins of both lower extremities, unspecified whether complicated, Venous (peripheral) insufficiency       ezetimibe 10 MG tablet    ZETIA    90 tablet    Take 1 tablet (10 mg) by mouth daily    Mixed hyperlipidemia       fluocinonide 0.05 % ointment    LIDEX    60 g    2- 30 gram tubes.  Apply twice a day as needed.    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)       metoprolol tartrate 25 MG tablet    LOPRESSOR    180 tablet    Take 1 tablet (25 mg) by mouth 2 times daily    Coronary artery disease involving coronary bypass graft 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       potassium chloride ER 20 MEQ ER tablet    K-TAB    30 tablet    Take 1 tablet (20 mEq) by mouth daily    Vitamin D deficiency, Left leg cellulitis, Essential hypertension, benign, Atrial fibrillation, unspecified type (H)       rosuvastatin 40 MG tablet    CRESTOR    90 tablet    Take 1 tablet (40 mg) by mouth daily    Hyperlipidemia, unspecified hyperlipidemia type       tamsulosin 0.4 MG capsule    FLOMAX    90 capsule    Take 1 capsule (0.4 mg) by mouth daily    Urinary retention       warfarin  5 MG tablet    COUMADIN    135 tablet    Take 1 1/2 tablets (7.5 mg) by mouth TWTFSS; 1 tablet (5 mg) on Mondays OR as directed by INR Clinic    Long-term (current) use of anticoagulants, S/P aortic valve replacement

## 2018-11-21 NOTE — PROGRESS NOTES
Vascular Cardiology Consultation      Fausto Farr MRN# [id number]   YOB: 1940 Age: 78 year old   Date of Visit 11/21/2018     Reason for consult:  Peripheral venous hypertension, venous claudication           Assessment and Plan:     1. Bilateral lower extremity discomfort with exertion, patient describes this as heavy and tight with normal rest and limited exercise KEERTHI but severe venous dilatation and incompetence consistent with venous claudication    We discussed that his symptoms may be from venous claudication.  He does have profound peripheral venous hypertension and reflux that are consistent with his symptoms.    Due to anticoagulation for mechanical mitral and aortic valves, would limit the amount of sclerotherapy he receives at one setting.    Plan on treating the left lower extremity first with VenaSeal closure of the left GSV with sclerotherapy and phlebectomy of the resultant varicosities.  We will schedule this for January.    Future treatment of the right lower extremity with extensive sclerotherapy and phlebectomy of the varicose veins.  Will likely tilt head up during the initial phase of sclerotherapy to prevent washout and increase the dwell time of the sclerotherapy in the varicose veins.  This will be somewhat of a challenging procedure as we do not have the ability to reduce the entry point high pressure with RF or glue in the varicose vein itself.  We may utilize manual compression proximally while we do the phlebectomy distally.  I did warn him that there would be quite a bit of bruising and bleeding with this sclerotherapy and phlebectomy.  We will schedule this second procedure in March when he returns from his vacation.      This note was transcribed using electronic voice recognition software and may contain typographical errors.             Chief Complaint:   Consult (Chronic systolic congestive heart failure )           History of Present Illness:   This patient is a  very pleasant 78 year old male that I am seeing in vascular clinic to follow-up on abnormal venous competency testing.  The patient also had exercise ABIs on 10/12/2018 with normal rest and exercise KEERTHI, however patient stopped early on the protocol due to dyspnea.  He did have symptoms of bilateral leg fatigue and buttock pain prior to stopping.    On his venous competency test, his right lower extremity has absent GSV secondary to previous vein stripping.  He has developed a severely refluxing 5.4-second, 4.6 mm large bifurcating varicose vein from pelvic region that traverses down his leg and reflexes all the way down to the ankle.    His left lower extremity has severe reflux in his dilated greater saphenous vein (4.1-5.3 seconds reflux, 9.6-12.6 mm) with resultant large varicose veins with severe reflux (6.9 mm, 3.1 seconds) from thigh to ankle.    His main symptoms are exertional thigh and leg heaviness that affects his quality of life.  With the absence of obstructive peripheral arterial disease, his symptoms could be consistent with venous claudication.  Certainly the amount of reflux bilaterally could cause such symptoms.    He also has some soreness of right greater than left varicose veins, he has had some intermittent left lower extremity swelling and bilateral bruising, but overall the skin changes are mild.  The edema is fairly well controlled with compression stockings.  He has been compliant on compression stockings for greater than 1 month and has worn bilateral stockings previously for a number of years after his vein stripping.  He does not notice any improvement in his exertional leg discomfort with the compression stockings.    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  He is anticoagulated for that.  He also has history of AAA repair.    History of ulcer: No  History of edema: Yes, mild  History of compression stockings: Yes, years previously, currently greater than 1  "month  History of leg discomfort requiring analgesics: Yes leg discomfort, no analgesics  History of itching, skin change or restless legs: No         Physical Exam:       Vitals: /58  Pulse 60  Ht 1.664 m (5' 5.5\")  Wt 102.5 kg (226 lb)  BMI 37.04 kg/m2  Constitutional:  cooperative;well developed;well nourished;in no acute distress        Skin:  warm and dry to the touch;no apparent skin lesions or masses noted        Head:  normocephalic        Eyes:  conjunctivae and lids unremarkable        ENT:  no pallor or cyanosis        Neck:  JVP normal;carotid pulses are full and equal bilaterally        Chest:  clear to auscultation        Cardiac: regular rhythm occasional premature beats   crisp prosthetic valve sounds            Abdomen:    obese      Extremities and Back:        Bilateral lower extremity varicose veins    Neurological:  no gross motor deficits;affect appropriate                      Past Medical History:   I have reviewed this patient's past medical history  Past Medical History:   Diagnosis Date     Abdominal pain      Abnormal ECG     RBBB, 1st degree AVB, Left axis deviation     Anemia     currently taking iron     Arrhythmia     pac, pvc     Back pain     since 1980     BPH (benign prostatic hyperplasia)      Bruit      CAD (coronary artery disease)      Cellulitis 10/18/12     Cellulitis 05/2018    GrpB strep LLE cellulitis  negative RACHAEL for veg     Chronic venous insufficiency     bilat lower extremities     Contact dermatitis and other eczema, due to unspecified cause      Diaphragmatic hernia without mention of obstruction or gangrene      Diastolic HF (heart failure) (H)      Gastric ulcer      Glucose intolerance (impaired glucose tolerance)      Heart murmur 9/16/13    valvular heart disease     Hyperlipidaemia      Hypertension 8/6/13     Lumbago      Malaise and fatigue      Metabolic syndrome      Mobitz (type) I (Wenckebach's) atrioventricular block     and RBBB     Nocturia " 10/18/12     Nocturia      Nonallopathic lesion of cervical region      Nonallopathic lesion of lumbar region      Nonallopathic lesion of pelvic region, not elsewhere classified      Nonallopathic lesion of rib cage      Nonallopathic lesion of sacral region      GAGE (obstructive sleep apnea)     pt declined cpap     Paroxysmal atrial fibrillation (H) 10/18/12     Prostate cancer (H) 2008    radiation seed, XRT      PVD (peripheral vascular disease) (H)      RBBB      Rotator cuff strain     and sprain     S/P AAA repair      S/P aortic valve replacement 2006    developed perivalve leak and MS, therefore redo surg 2013     S/P CABG (coronary artery bypass graft) 2006    Lima-Lad, RA-Rca     Sciatica of left side     since 2000     Sepsis due to group B Streptococcus (H) 5/19/2018     Ulcerative colitis (H)      Varicose veins of bilateral lower extremities with other complications     s/p RLE vein stripping     Vitamin D deficiency              Past Surgical History:   I have reviewed this patient's past surgical history  Past Surgical History:   Procedure Laterality Date     AORTIC VALVE REPLACEMENT  1/3/06    redo AVR SJM 21mm and SJM MVR 27mm in 2013SJ 21():AVR, SJM 27 :MVR-     ARTHROPLASTY KNEE      right knee     BACK SURGERY  Oct 2015    Fusion L4-5, laminectomy L2, L3     BYPASS GRAFT ARTERY CORONARY  10/2013    reimplantation of radial artery graft to RCA     C CABG, VEIN, TWO  1/3/06    Left radial to RCA, LIMA to LAD (RA to RCA reimplanted at time of 2013 surg)     CARDIAC CATHERIZATION  11/2005    Stent placed to RCA     CARDIAC CATHERIZATION  04/2013    Occluded RCA, patent LIMA to LAD and radial graft to PDA     CARPAL TUNNEL RELEASE RT/LT  1994     COLONOSCOPY  8-22-11     CYSTOSCOPY FLEXIBLE  10/16/2013    Procedure: CYSTOSCOPY FLEXIBLE;  FLEXIBLE CYSTOSCOPY / DILATION OF URETHRA / INSERTION OF LESLIE;  Surgeon: Cooper Wallace MD;  Location: [facility]     ENDOVASCULAR REPAIR ANEURYSM  ABDOMINAL AORTA  2006     ENDOVASCULAR REPAIR, INFRARENAL ABDOMINAL AORTIC ANEURYSM/DISSECTION; MODULAR BIFURCATED PROSTHESIS      AAA repair endovascular     ENT SURGERY       GENITOURINARY SURGERY  6/16/08    radioactive seeding     HEAD & NECK SURGERY  1997    vocal cord polypectomy     KNEE SURGERY  2001 Right knee arthroscopy     OPTICAL TRACKING SYSTEM FUSION SPINE POSTERIOR LUMBAR THREE+ LEVELS N/A 10/29/2015    Procedure: OPTICAL TRACKING SYSTEM FUSION SPINE POSTERIOR LUMBAR THREE+ LEVELS;  Surgeon: Walt Garcia MD;  Location: [facility] OR     PROSTATE SURGERY  06/16/2008 Radioactive seeding     PROSTATE SURGERY      radioactive seeding 6/16/08     REPAIR ANEURYSM ABDOMINAL AORTA  06/08     REPAIR VALVE MITRAL  10/16/2013    SJM 21([id number]):AVR, SJM 27 :MVRProcedure: REPAIR VALVE MITRAL;  REDO STERNOTOMY/REDO AORTIC VALVE REPLACEMENT/ MITRAL VALVE REPLACEMENT/REIMPLANTATION OF RIGHT CORONARY ARTERY BYPASS WITH RACHAEL ( ON PUMP);  Surgeon: Viet Singh MD;  Location: [facility]     REPLACE VALVE AORTIC  10/16/2013    Procedure: REPLACE VALVE AORTIC;;  Surgeon: Viet Singh MD;  Location: [facility]     SURGERY GENERAL IP CONSULT  05/2008 Excision aneurysm abdominal aorta     SURGERY GENERAL IP CONSULT  1997 Vocal cord polypectomy     VASCULAR SURGERY  1968, 1993     varicose vein stripping               Social History:   I have reviewed this patient's social history  Social History   Substance Use Topics     Smoking status: Former Smoker     Packs/day: 1.00     Years: 40.00     Quit date: 10/23/2002     Smokeless tobacco: Never Used     Alcohol use Yes      Comment: a couple beers per week (socially)             Family History:   I have reviewed this patient's family history  Family History   Problem Relation Age of Onset     Coronary Artery Disease Father      CABG     HEART DISEASE Father      Pacemaker     Other Cancer Daughter      HEART DISEASE Brother      Other - See Comments Grandchild               Allergies:     Allergies   Allergen Reactions     Bees Anaphylaxis             Medications:   I have reviewed this patient's current medications  Current Outpatient Prescriptions   Medication Sig Dispense Refill     aspirin 81 MG EC tablet Take 1 tablet (81 mg) by mouth daily 1 tablet 0     betamethasone valerate (VALISONE) 0.1 % lotion APPLY TOPICALLY TWICE DAILY PRN 60 mL 3     COMPRESSION STOCKINGS 1 each daily 1 each 1     ezetimibe (ZETIA) 10 MG tablet Take 1 tablet (10 mg) by mouth daily 90 tablet 3     fluocinonide (LIDEX) 0.05 % ointment 2- 30 gram tubes.  Apply twice a day as needed. 60 g 2     furosemide (LASIX) 40 MG tablet Take 0.5 tablets (20 mg) by mouth daily 45 tablet 3     HERBALS White willow bark, Turmeric, Ginger, botswellia & Yucca mixed powder(1.5 tsp mixed in cranberry juice or orange juice) every day 60 each 11     mesalamine (ASACOL HD) 800 MG EC tablet Take 1 tablet (800 mg) by mouth 2 times daily 180 tablet 3     metoprolol tartrate (LOPRESSOR) 25 MG tablet Take 1 tablet (25 mg) by mouth 2 times daily 180 tablet 3     Potassium Chloride ER 20 MEQ TBCR Take 1 tablet (20 mEq) by mouth daily 30 tablet 1     rosuvastatin (CRESTOR) 40 MG tablet Take 1 tablet (40 mg) by mouth daily 90 tablet 3     tamsulosin (FLOMAX) 0.4 MG capsule Take 1 capsule (0.4 mg) by mouth daily 90 capsule 3     warfarin (COUMADIN) 5 MG tablet Take 1 1/2 tablets (7.5 mg) by mouth TWTFSS; 1 tablet (5 mg) on Mondays OR as directed by INR Clinic 135 tablet 3               Review of Systems:   Review of Systems:  11 point review of systems performed and negative except as for HPI and PMH              Data:   All laboratory data reviewed  Lab Results   Component Value Date    CHOL 128 07/12/2018     Lab Results   Component Value Date    HDL 36 07/12/2018     Lab Results   Component Value Date    LDL 59 07/12/2018     Lab Results   Component Value Date    TRIG 165 07/12/2018     Lab Results   Component Value Date     CHOLHDLRATIO 3.6 06/03/2015     TSH   Date Value Ref Range Status   05/08/2018 0.96 0.40 - 4.00 mU/L Final     Last Basic Metabolic Panel:  Lab Results   Component Value Date     07/12/2018      Lab Results   Component Value Date    POTASSIUM 4.0 07/12/2018     Lab Results   Component Value Date    CHLORIDE 106 07/12/2018     Lab Results   Component Value Date    AWILDA 9.0 07/12/2018     Lab Results   Component Value Date    CO2 29 07/12/2018     Lab Results   Component Value Date    BUN 15 07/12/2018     Lab Results   Component Value Date    CR 1.01 07/12/2018     Lab Results   Component Value Date     07/12/2018     Lab Results   Component Value Date    WBC 10.1 05/31/2018     Lab Results   Component Value Date    RBC 4.68 05/31/2018     Lab Results   Component Value Date    HGB 14.0 05/31/2018     Lab Results   Component Value Date    HCT 43.2 05/31/2018     Lab Results   Component Value Date    MCV 92 05/31/2018     Lab Results   Component Value Date    MCH 29.9 05/31/2018     Lab Results   Component Value Date    MCHC 32.4 05/31/2018     Lab Results   Component Value Date    RDW 13.3 05/31/2018     Lab Results   Component Value Date     05/31/2018

## 2018-11-22 NOTE — PLAN OF CARE
Problem: Sepsis/Septic Shock (Adult)  Goal: Signs and Symptoms of Listed Potential Problems Will be Absent, Minimized or Managed (Sepsis/Septic Shock)  Signs and symptoms of listed potential problems will be absent, minimized or managed by discharge/transition of care (reference Sepsis/Septic Shock (Adult) CPG).   Outcome: Improving  A&O. VSS. Tele SB-SR with 1st degree av block and BBB and PVC.  Reg diet- swallowing well after RACHAEL.   Up with SBA-indpt. Denies pain.        No

## 2018-11-27 ENCOUNTER — ANTICOAGULATION THERAPY VISIT (OUTPATIENT)
Dept: NURSING | Facility: CLINIC | Age: 78
End: 2018-11-27
Payer: MEDICARE

## 2018-11-27 DIAGNOSIS — I48.91 AF (ATRIAL FIBRILLATION) (H): ICD-10-CM

## 2018-11-27 DIAGNOSIS — Z95.2 S/P MITRAL VALVE REPLACEMENT: ICD-10-CM

## 2018-11-27 DIAGNOSIS — Z79.01 LONG TERM CURRENT USE OF ANTICOAGULANT THERAPY: ICD-10-CM

## 2018-11-27 LAB — INR POINT OF CARE: 4.3 (ref 0.86–1.14)

## 2018-11-27 PROCEDURE — 99207 ZZC NO CHARGE NURSE ONLY: CPT

## 2018-11-27 PROCEDURE — 36416 COLLJ CAPILLARY BLOOD SPEC: CPT

## 2018-11-27 PROCEDURE — 85610 PROTHROMBIN TIME: CPT | Mod: QW

## 2018-11-27 NOTE — PROGRESS NOTES
ANTICOAGULATION FOLLOW-UP CLINIC VISIT    Patient Name:  Fausto Farr  Date:  11/27/2018  Contact Type:  Face to Face    SUBJECTIVE:     Patient Findings     Positives Herbal/Supplements    Comments Continues to take herbal supplement.  Per micromedex: Severity: Moderate.  Concurrent use of WARFARIN and WILLOW   may result in increased anticoagulant effectiveness.  Concurrent use of JOSH and ANTICOAGULANTS   may result in increased risk of bleeding.     Maintenance dose decreased.             OBJECTIVE    INR Protime   Date Value Ref Range Status   11/27/2018 4.3 (A) 0.86 - 1.14 Final       ASSESSMENT / PLAN  INR assessment SUPRA    Recheck INR In: 3 WEEKS    INR Location Clinic      Anticoagulation Summary as of 11/27/2018     INR goal 2.5-3.5   Today's INR 4.3!   Warfarin maintenance plan 7.5 mg (5 mg x 1.5) on Tue, Thu, Sat; 5 mg (5 mg x 1) all other days   Full warfarin instructions 11/27: 2.5 mg; Otherwise 7.5 mg on Tue, Thu, Sat; 5 mg all other days   Weekly warfarin total 42.5 mg   Plan last modified Caryn Campos RN (11/27/2018)   Next INR check 12/18/2018   Priority INR   Target end date Indefinite    Indications   AF (atrial fibrillation) (H) [I48.91]  S/P mitral valve replacement [Z95.2]  Long term current use of anticoagulant therapy [Z79.01]         Anticoagulation Episode Summary     INR check location     Preferred lab     Send INR reminders to [facility]    Comments 5mg tabs - andrade dose // transfer from [facility] // [facility] // CALENDAR      Anticoagulation Care Providers     Provider Role Specialty Phone number    Tu Reyes MD  Internal Medicine [phone]            See the Encounter Report to view Anticoagulation Flowsheet and Dosing Calendar (Go to Encounters tab in chart review, and find the Anticoagulation Therapy Visit)    Dosage adjustment made based on physician directed care plan.    Caryn Campos RN

## 2018-11-27 NOTE — MR AVS SNAPSHOT
Fausto Farr   11/27/2018 3:00 PM   Anticoagulation Therapy Visit    Description:  78 year old male   Provider:  [facility]   Department:  [facility]           INR as of 11/27/2018     Today's INR 4.3!      Anticoagulation Summary as of 11/27/2018     INR goal 2.5-3.5   Today's INR 4.3!   Full warfarin instructions 11/27: 2.5 mg; Otherwise 7.5 mg on Tue, Thu, Sat; 5 mg all other days   Next INR check 12/18/2018    Indications   AF (atrial fibrillation) (H) [I48.91]  S/P mitral valve replacement [Z95.2]  Long term current use of anticoagulant therapy [Z79.01]         Your next Anticoagulation Clinic appointment(s)     Dec 18, 2018  8:45 AM CST   Anticoagulation Visit with  ANTICOAGULATION CLINIC   [facility] ([facility])    [address] [place] [address]  [address]  [facility] [id number] [phone]              Contact Numbers     [facility]  Please call  [phone] to cancel and/or reschedule your appointment   Please call  [phone] with any problems or questions regarding your therapy.        November 2018 Details    Sun Mon Tue Wed Thu Fri Sat         1               2               3                 4               5               6               7               8               9               10                 11               12               13               14               15               16               17                 18               19               20               21               22               23               24                 25               26               27      2.5 mg   See details      28      5 mg         29      7.5 mg         30      5 mg           Date Details   11/27 This INR check               How to take your warfarin dose     To take:  2.5 mg Take 0.5 of a 5 mg tablet.    To take:  5 mg Take 1 of the 5 mg tablets.    To take:  7.5 mg Take 1.5 of the 5 mg tablets.           December 2018 Details    Sun Mon Tue Wed  Thu Fri Sat           1      7.5 mg           2      5 mg         3      5 mg         4      7.5 mg         5      5 mg         6      7.5 mg         7      5 mg         8      7.5 mg           9      5 mg         10      5 mg         11      7.5 mg         12      5 mg         13      7.5 mg         14      5 mg         15      7.5 mg           16      5 mg         17      5 mg         18            19               20               21               22                 23               24               25               26               27               28               29                 30               31                     Date Details   No additional details    Date of next INR:  12/18/2018         How to take your warfarin dose     To take:  5 mg Take 1 of the 5 mg tablets.    To take:  7.5 mg Take 1.5 of the 5 mg tablets.

## 2018-12-18 ENCOUNTER — ANTICOAGULATION THERAPY VISIT (OUTPATIENT)
Dept: NURSING | Facility: CLINIC | Age: 78
End: 2018-12-18
Payer: MEDICARE

## 2018-12-18 DIAGNOSIS — Z95.2 S/P MITRAL VALVE REPLACEMENT: ICD-10-CM

## 2018-12-18 DIAGNOSIS — I48.91 AF (ATRIAL FIBRILLATION) (H): ICD-10-CM

## 2018-12-18 DIAGNOSIS — Z79.01 LONG TERM CURRENT USE OF ANTICOAGULANT THERAPY: Primary | ICD-10-CM

## 2018-12-18 LAB — INR POINT OF CARE: 2.3 (ref 0.86–1.14)

## 2018-12-18 PROCEDURE — 36416 COLLJ CAPILLARY BLOOD SPEC: CPT

## 2018-12-18 PROCEDURE — 99207 ZZC NO CHARGE NURSE ONLY: CPT

## 2018-12-18 PROCEDURE — 85610 PROTHROMBIN TIME: CPT | Mod: QW

## 2018-12-18 NOTE — PROGRESS NOTES
ANTICOAGULATION FOLLOW-UP CLINIC VISIT    Patient Name:  Fausto Farr  Date:  2018  Contact Type:  Face to Face    SUBJECTIVE:     Patient Findings     Comments:   Ate large lettuce salad last night.    Maintenance dose increased.    Having procedure on left leg veins 19.  Was told he does not need to hold warfarin.             OBJECTIVE    INR Protime   Date Value Ref Range Status   2018 2.3 (A) 0.86 - 1.14 Final       ASSESSMENT / PLAN  INR assessment SUB    Recheck INR In: 2 WEEKS    INR Location Clinic      Anticoagulation Summary  As of 2018    INR goal:   2.5-3.5   TTR:   52.6 % (2.6 y)   INR used for dosin.3! (2018)   Warfarin maintenance plan:   5 mg (5 mg x 1) every Mon, Wed, Fri; 7.5 mg (5 mg x 1.5) all other days   Full warfarin instructions:   : 10 mg; Otherwise 5 mg every Mon, Wed, Fri; 7.5 mg all other days   Weekly warfarin total:   45 mg   Plan last modified:   Caryn Campos RN (2018)   Next INR check:   1/3/2019   Priority:   INR   Target end date:   Indefinite    Indications    AF (atrial fibrillation) (H) [I48.91]  S/P mitral valve replacement [Z95.2]  Long term current use of anticoagulant therapy [Z79.01]             Anticoagulation Episode Summary     INR check location:       Preferred lab:       Send INR reminders to:   [facility]    Comments:   5mg tabs - andrade dose // transfer from [facility] // [facility] // CALENDAR      Anticoagulation Care Providers     Provider Role Specialty Phone number    Tu Reyes MD  Internal Medicine [phone]            See the Encounter Report to view Anticoagulation Flowsheet and Dosing Calendar (Go to Encounters tab in chart review, and find the Anticoagulation Therapy Visit)    Dosage adjustment made based on physician directed care plan.    Caryn Campos RN

## 2019-01-03 ENCOUNTER — ANTICOAGULATION THERAPY VISIT (OUTPATIENT)
Dept: NURSING | Facility: CLINIC | Age: 79
End: 2019-01-03
Payer: MEDICARE

## 2019-01-03 DIAGNOSIS — Z95.2 S/P MITRAL VALVE REPLACEMENT: ICD-10-CM

## 2019-01-03 DIAGNOSIS — Z79.01 LONG TERM CURRENT USE OF ANTICOAGULANT THERAPY: Primary | ICD-10-CM

## 2019-01-03 DIAGNOSIS — I48.91 AF (ATRIAL FIBRILLATION) (H): ICD-10-CM

## 2019-01-03 LAB — INR POINT OF CARE: 1.9 (ref 0.86–1.14)

## 2019-01-03 PROCEDURE — 85610 PROTHROMBIN TIME: CPT | Mod: QW

## 2019-01-03 PROCEDURE — 36416 COLLJ CAPILLARY BLOOD SPEC: CPT

## 2019-01-03 PROCEDURE — 99207 ZZC NO CHARGE NURSE ONLY: CPT

## 2019-01-03 NOTE — PROGRESS NOTES
ANTICOAGULATION FOLLOW-UP CLINIC VISIT    Patient Name:  Fausto Farr  Date:  1/3/2019  Contact Type:  Face to Face    SUBJECTIVE:     Patient Findings     Positives:   Dental/Other procedures, Missed doses    Comments:   Missed dose on .    He is having an ablation and sclerotherapy on his left leg next week.  Was told by doctor that he didn't have to hold warfarin.           OBJECTIVE    INR Protime   Date Value Ref Range Status   2019 1.9 (A) 0.86 - 1.14 Final       ASSESSMENT / PLAN  INR assessment SUB    Recheck INR In: 2 WEEKS    INR Location Clinic      Anticoagulation Summary  As of 1/3/2019    INR goal:   2.5-3.5   TTR:   51.7 % (2.7 y)   INR used for dosin.9! (1/3/2019)   Warfarin maintenance plan:   5 mg (5 mg x 1) every Mon, Fri; 7.5 mg (5 mg x 1.5) all other days   Full warfarin instructions:   1/3: 10 mg; Otherwise 5 mg every Mon, Fri; 7.5 mg all other days   Weekly warfarin total:   47.5 mg   Plan last modified:   Caryn Campos, RN (1/3/2019)   Next INR check:   2019   Priority:   INR   Target end date:   Indefinite    Indications    AF (atrial fibrillation) (H) [I48.91]  S/P mitral valve replacement [Z95.2]  Long term current use of anticoagulant therapy [Z79.01]             Anticoagulation Episode Summary     INR check location:       Preferred lab:       Send INR reminders to:   [facility]    Comments:   5mg tabs - andrade dose // transfer from [facility] // [facility] // CALENDAR      Anticoagulation Care Providers     Provider Role Specialty Phone number    Tu Reyes MD  Internal Medicine [phone]            See the Encounter Report to view Anticoagulation Flowsheet and Dosing Calendar (Go to Encounters tab in chart review, and find the Anticoagulation Therapy Visit)      Dosage adjustment made based on physician directed care plan.    Caryn Campos RN

## 2019-01-04 ENCOUNTER — TELEPHONE (OUTPATIENT)
Dept: CARDIOLOGY | Facility: CLINIC | Age: 79
End: 2019-01-04

## 2019-01-04 DIAGNOSIS — I87.2 VENOUS (PERIPHERAL) INSUFFICIENCY: Primary | ICD-10-CM

## 2019-01-04 DIAGNOSIS — I87.2 VENOUS INSUFFICIENCY: Primary | ICD-10-CM

## 2019-01-04 RX ORDER — DIAZEPAM 5 MG
5 TABLET ORAL EVERY 6 HOURS PRN
Qty: 2 TABLET | Refills: 0 | Status: SHIPPED | OUTPATIENT
Start: 2019-01-04 | End: 2019-05-21

## 2019-01-04 RX ORDER — DIAZEPAM 5 MG
TABLET ORAL
Qty: 2 TABLET | Refills: 0 | Status: SHIPPED | OUTPATIENT
Start: 2019-01-04 | End: 2019-01-04

## 2019-01-04 NOTE — TELEPHONE ENCOUNTER
Spoke with patient about below instructions for upcoming vein clinic procedure. Patient verbalized understanding and agreed with plan of care.

## 2019-01-04 NOTE — TELEPHONE ENCOUNTER
Called patient to review Pre- Ablation orders:    Patient will increase fluid the day before the procedure.  Patient will hold diuretic until after the ablation.  Patient will not wear compression stockings the day before or the day of the ablation.  Valium was explained to patient and ordered to pharmacy of choice.  Patient aware to bring Valium to clinic.  Patient will have a  to bring them home.  Follow-up ultrasound for Wednesday scheduled.  Follow-up OV for 1 month scheduled.     Patient did not answer. Left message for patient to call back.

## 2019-01-08 ENCOUNTER — ANCILLARY PROCEDURE (OUTPATIENT)
Dept: VASCULAR ULTRASOUND | Facility: CLINIC | Age: 79
End: 2019-01-08
Attending: INTERNAL MEDICINE
Payer: MEDICARE

## 2019-01-08 VITALS
DIASTOLIC BLOOD PRESSURE: 75 MMHG | SYSTOLIC BLOOD PRESSURE: 128 MMHG | OXYGEN SATURATION: 95 % | HEART RATE: 70 BPM | RESPIRATION RATE: 12 BRPM

## 2019-01-08 DIAGNOSIS — I83.813 VARICOSE VEINS OF BOTH LOWER EXTREMITIES WITH PAIN: ICD-10-CM

## 2019-01-08 DIAGNOSIS — I83.893 SYMPTOMATIC VARICOSE VEINS OF BOTH LOWER EXTREMITIES: ICD-10-CM

## 2019-01-08 PROCEDURE — 36475 ENDOVENOUS RF 1ST VEIN: CPT | Mod: LT | Performed by: INTERNAL MEDICINE

## 2019-01-08 PROCEDURE — 36476 ENDOVENOUS RF VEIN ADD-ON: CPT | Mod: LT | Performed by: INTERNAL MEDICINE

## 2019-01-08 PROCEDURE — 36482 ENDOVEN THER CHEM ADHES 1ST: CPT | Mod: 59 | Performed by: INTERNAL MEDICINE

## 2019-01-08 PROCEDURE — 36471 NJX SCLRSNT MLT INCMPTNT VN: CPT | Mod: LT | Performed by: INTERNAL MEDICINE

## 2019-01-08 PROCEDURE — 37765 STAB PHLEB VEINS XTR 10-20: CPT | Mod: LT | Performed by: INTERNAL MEDICINE

## 2019-01-08 NOTE — PROGRESS NOTES
VSS stable throughout procedure. Patient tolerated procedure well. RN reviewed discharge instructions and apts with patient and patient's wifes.

## 2019-01-10 ENCOUNTER — ANCILLARY PROCEDURE (OUTPATIENT)
Dept: VASCULAR ULTRASOUND | Facility: CLINIC | Age: 79
End: 2019-01-10
Attending: INTERNAL MEDICINE
Payer: MEDICARE

## 2019-01-10 DIAGNOSIS — I83.893 SYMPTOMATIC VARICOSE VEINS OF BOTH LOWER EXTREMITIES: ICD-10-CM

## 2019-01-10 DIAGNOSIS — I87.8 VENOUS INTERMITTENT CLAUDICATION: ICD-10-CM

## 2019-01-10 PROCEDURE — 93971 EXTREMITY STUDY: CPT | Mod: LT | Performed by: INTERNAL MEDICINE

## 2019-01-11 ENCOUNTER — ANTICOAGULATION THERAPY VISIT (OUTPATIENT)
Dept: NURSING | Facility: CLINIC | Age: 79
End: 2019-01-11
Payer: MEDICARE

## 2019-01-11 DIAGNOSIS — Z95.2 S/P MITRAL VALVE REPLACEMENT: ICD-10-CM

## 2019-01-11 DIAGNOSIS — Z79.01 LONG TERM CURRENT USE OF ANTICOAGULANT THERAPY: Primary | ICD-10-CM

## 2019-01-11 DIAGNOSIS — I48.91 AF (ATRIAL FIBRILLATION) (H): ICD-10-CM

## 2019-01-11 LAB — INR POINT OF CARE: 3.2 (ref 0.86–1.14)

## 2019-01-11 PROCEDURE — 99207 ZZC NO CHARGE NURSE ONLY: CPT

## 2019-01-11 PROCEDURE — 85610 PROTHROMBIN TIME: CPT | Mod: QW

## 2019-01-11 PROCEDURE — 36416 COLLJ CAPILLARY BLOOD SPEC: CPT

## 2019-01-11 NOTE — PROGRESS NOTES
"ANTICOAGULATION FOLLOW-UP CLINIC VISIT    Patient Name:  Fausto Farr  Date:  1/11/2019  Contact Type:  Face to Face    SUBJECTIVE:     Patient Findings     Positives:   Change in medications, Dental/Other procedures    Comments:   He had phlebectomy and sclerotherapy on his left leg varicose veins on 1/8/19.    Note from Dr. Whitman:  \"Patient has been subtherapeutic on his INR recently, so we elected  to boost his anticoagulation with Eliquis 2.5mg BID starting tonight  for three days while his INR gets back to therapeutic to prevent  extension of clot to the deep vein system.\"     Patient did not take Eliquis today.           OBJECTIVE    INR Protime   Date Value Ref Range Status   01/11/2019 3.2 (A) 0.86 - 1.14 Final       ASSESSMENT / PLAN  INR assessment THER    Recheck INR In: 3 WEEKS    INR Location Clinic      Anticoagulation Summary  As of 1/11/2019    INR goal:   2.5-3.5   TTR:   51.7 % (2.7 y)   INR used for dosing:   3.2 (1/11/2019)   Warfarin maintenance plan:   5 mg (5 mg x 1) every Mon, Fri; 7.5 mg (5 mg x 1.5) all other days   Full warfarin instructions:   5 mg every Mon, Fri; 7.5 mg all other days   Weekly warfarin total:   47.5 mg   No change documented:   Caryn Campos, RN   Plan last modified:   Caryn Campos RN (1/3/2019)   Next INR check:   2/5/2019   Priority:   INR   Target end date:   Indefinite    Indications    AF (atrial fibrillation) (H) [I48.91]  S/P mitral valve replacement [Z95.2]  Long term current use of anticoagulant therapy [Z79.01]             Anticoagulation Episode Summary     INR check location:       Preferred lab:       Send INR reminders to:   [facility]    Comments:   5mg tabs - andrade dose // transfer from [facility] // [facility] // CALENDAR      Anticoagulation Care Providers     Provider Role Specialty Phone number    Tu Reyes MD  Internal Medicine [phone]            See the Encounter Report to view Anticoagulation Flowsheet and " Dosing Calendar (Go to Encounters tab in chart review, and find the Anticoagulation Therapy Visit)        Caryn Campos RN

## 2019-02-04 ENCOUNTER — TELEPHONE (OUTPATIENT)
Dept: CARDIOLOGY | Facility: CLINIC | Age: 79
End: 2019-02-04

## 2019-02-04 NOTE — TELEPHONE ENCOUNTER
"Pt called back - he states that after vein ablation 01/08/2019 he went to urgency room to loosen the ace wrap- after that day he developed blistering of his leg and his wife has been wrapping it. This morning, he \"cut off\" a blister and there was \"pus\" under it.  Scheduled follow up tomorrow with Kimberly ISLAS to assess leg.    "

## 2019-02-04 NOTE — TELEPHONE ENCOUNTER
"Pt called- left voice message - states he has been having \"blistering\" of his legs and his wife has been wrapping them since the ablation.    Called him back - left voice message to please call back.  Will have pt see Kimberly ISLAS this week instead of 02/14/2019  "

## 2019-02-05 ENCOUNTER — OFFICE VISIT (OUTPATIENT)
Dept: CARDIOLOGY | Facility: CLINIC | Age: 79
End: 2019-02-05
Payer: MEDICARE

## 2019-02-05 VITALS
WEIGHT: 228.7 LBS | SYSTOLIC BLOOD PRESSURE: 128 MMHG | OXYGEN SATURATION: 94 % | HEIGHT: 66 IN | HEART RATE: 69 BPM | DIASTOLIC BLOOD PRESSURE: 70 MMHG | BODY MASS INDEX: 36.76 KG/M2

## 2019-02-05 DIAGNOSIS — Z95.2 S/P MITRAL VALVE REPLACEMENT: ICD-10-CM

## 2019-02-05 DIAGNOSIS — Z95.2 S/P AORTIC VALVE REPLACEMENT: ICD-10-CM

## 2019-02-05 DIAGNOSIS — E78.5 HYPERLIPIDEMIA LDL GOAL <100: Primary | ICD-10-CM

## 2019-02-05 DIAGNOSIS — I48.0 PAROXYSMAL ATRIAL FIBRILLATION (H): ICD-10-CM

## 2019-02-05 DIAGNOSIS — Z95.1 S/P CABG (CORONARY ARTERY BYPASS GRAFT): ICD-10-CM

## 2019-02-05 DIAGNOSIS — I71.40 ABDOMINAL AORTIC ANEURYSM (AAA) WITHOUT RUPTURE (H): ICD-10-CM

## 2019-02-05 DIAGNOSIS — I10 ESSENTIAL HYPERTENSION, BENIGN: ICD-10-CM

## 2019-02-05 PROCEDURE — 99214 OFFICE O/P EST MOD 30 MIN: CPT | Mod: 24 | Performed by: PHYSICIAN ASSISTANT

## 2019-02-05 ASSESSMENT — MIFFLIN-ST. JEOR: SCORE: 1697.19

## 2019-02-05 NOTE — PROGRESS NOTES
VASCULAR and CARDIOLOGY CLINIC PROGRESS NOTE    DOS: 2019      Fausto Farr  : 1940, 78 year old  MRN: [id number]      History: I had the pleasure of following up with Fausto Farr today in the vascular clinic.  He was accompanied by his wife, Ritu.  He is a patient of Dr. Santo, but has also seen Dr. Vasquez () and Dr. Wyatt and Dr. Whitman (vascular clinic).     Ralph is a very pleasant 78 year old man with extensive prior cardiovascular history.     He underwent 2-vessel coronary bypass grafting (LIMA to his LAD and a left radial graft to his right coronary artery) and aortic valve replacement in .  Over the years, he developed a perivalvular leak and also was found to have significant mitral stenosis.  In , he underwent redo mechanical aortic valve replacement and mechanical mitral valve replacement.  He has remained on chronic warfarin.  Followup echocardiograms have shown normal valvular gradients.     He has AV conduction disease and PVCs.  In 2018, Dr. Santo ordered a treadmill stress test to evaluate BRISENO as he wondered whether it may be related to chronotropic incompetence or development of AV block with exertion.  The patient had a stress test on 2018 where he lasted only 4 minutes and 25 seconds on a modified Waqas protocol.  He achieved a peak heart rate of only 106 beats per minute.  He stopped because of back and hip pain rather than fatigue or shortness of breath.  There was no AV block with exertion.    A Holter was done that showed frequent PVCs, burden of approximately 10%.  There were episodes of possible transient second-degree AV block and a 15-beat run of AIVR.  No high-grade bradycardia or tachycardia was noted.   He saw Dr. Vasquez 18, and though he was noted to have conduction disease, there was no clear high-grade AV block or sxs to justify a pacemaker implantation. Dr. Vasquez recommended repeating a 10 day Zio Patch in 3/2019 to reassess his cardiac  rhythm and specifically look for intermittent AV block.     He has had previous endovascular AAA repair in 2010 (by Dr. Mays from Vascular Surgery as well as IR).  His most recent vascular evaluation was aortoiliac ultrasound performed on 07/10/2018.  That ultrasound was read as showing patent [id number] stent graft with aneurysmal sac size of 7 cm AP x 6 cm transverse, previously 5.8 cm AP x 6.4 cm transverse in 2015.  There is aneurysmal dilatation of the left common iliac artery measuring up to 2.4 cm which has increased from previous ultrasound.      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  He continues to have exertional lower back pain radiating to his hips.  The discomfort starts in the coccyx and buttocks area.  It is usually predictable and is brought on by exertion.  The symptoms are usually better if he is leaning forward, especially if he is supported by a shopping cart.  He can walk with a shopping cart leaning forward in the store without any difficulty.  He denies any rest pain.  His symptoms are suggestive of neurogenic claudication but he reports that he was recently seen by his spine surgeon who performed imaging.  He was told there was no structural abnormality in his lower back.   He did undergo ABIs that showed both normal resting and exercise ABIs.   He had venous competency studies done.  No DVTs bilaterally.    On the right, he is s/p vein stripping of the GSV. There was varicose vein from upper thigh to the ankle, up to 4mm in size with up to 5.4 sec of reflux.   On the left, the left GSV is dilated with severe reflux throughout, from 4.1 to 6.7 sec.  Significant varicose veins communicating with the GSV, reflux of 1.4 to 3.1 sec    He also has obstructive sleep apnea and is on CPAP therapy since late 2017.      For his BRISENO, Dr. Santo ordered the treadmill stress test as noted above.  Given Ralph's prior smoking history, Dr. Santo also ordered PFTS.  The PFTs  were done 7/16/18 and showed normal forced vital capacity and FEV1 at 86% of predicted.  FEV1 over FVC ratio is normal at 76%. Lung volumes normal, diffusing capacity is minimally reduced at 75% of predicted.  He did see Dr. Galeana, who noted the unremarkable PFTs.  He did consider asthma may be contributing. So a trial of Trelegy was prescribed.  I do not see he followed up with Dr. Galeana.   Dr. Santo recommended that when/if he needs to be off anticoagulation, we should use that time to perform a RHC.       He met Dr. Whitman 11/21/18.  He arranged for treating the left LE GSV with VenaSeal closure of the left GSV with sclerotherapy and phlebectomy.  This was done 1/8/19.      LLE US 1/10/19:  reviewed by Dr. Whitman who noted prox left GSV didn't close very well from approach used. Pt may not feel the full symptomatic benefit.     He called in 2/4/19 noting blistering of his legs.  He went to the Urgency Room and had the wraps loosened.       Interval History:   His appointment was moved up to today from 2/14/19.   Dr. Whitman was able to be present for the visit.   Leg is finally feeling better.  The last scab fell off.  The blistering is gone.  The swelling is better.   He is not certain if the heaviness is improved yet.   He does not want to proceed with the right leg at this time.   No chest pain  No syncope      ROS:  Skin:  Negative     Eyes:  Positive for glasses  ENT:  Positive for hearing loss  Respiratory:  Positive for dyspnea on exertion  Cardiovascular:  Negative    Gastroenterology: Negative    Genitourinary:       Musculoskeletal:       Neurologic:  Negative    Psychiatric:  Negative    Heme/Lymph/Imm:  Positive for allergies  Endocrine:  Negative      PAST MEDICAL HISTORY:  Past Medical History:   Diagnosis Date     Abdominal pain      Abnormal ECG     RBBB, 1st degree AVB, Left axis deviation     Anemia     currently taking iron     Arrhythmia     pac, pvc     Back pain     since 1980     BPH (benign  prostatic hyperplasia)      Bruit      CAD (coronary artery disease)      Cellulitis 10/18/12     Cellulitis 05/2018    GrpB strep LLE cellulitis  negative RACHAEL for veg     Chronic venous insufficiency     bilat lower extremities     Contact dermatitis and other eczema, due to unspecified cause      Diaphragmatic hernia without mention of obstruction or gangrene      Diastolic HF (heart failure) (H)      Gastric ulcer      Glucose intolerance (impaired glucose tolerance)      Heart murmur 9/16/13    valvular heart disease     Hyperlipidaemia      Hypertension 8/6/13     Lumbago      Malaise and fatigue      Metabolic syndrome      Mobitz (type) I (Wenckebach's) atrioventricular block     and RBBB     Nocturia 10/18/12     Nocturia      Nonallopathic lesion of cervical region      Nonallopathic lesion of lumbar region      Nonallopathic lesion of pelvic region, not elsewhere classified      Nonallopathic lesion of rib cage      Nonallopathic lesion of sacral region      GAGE (obstructive sleep apnea)     pt declined cpap     Paroxysmal atrial fibrillation (H) 10/18/12     Prostate cancer (H) 2008    radiation seed, XRT      PVD (peripheral vascular disease) (H)      RBBB      Rotator cuff strain     and sprain     S/P AAA repair      S/P aortic valve replacement 2006    developed perivalve leak and MS, therefore redo surg 2013     S/P CABG (coronary artery bypass graft) 2006    Lima-Lad, RA-Rca     Sciatica of left side     since 2000     Sepsis due to group B Streptococcus (H) 5/19/2018     Ulcerative colitis (H)      Varicose veins of bilateral lower extremities with other complications     s/p RLE vein stripping     Vitamin D deficiency        PAST SURGICAL HISTORY:  Past Surgical History:   Procedure Laterality Date     AORTIC VALVE REPLACEMENT  1/3/06    redo AVR SJM 21mm and RODGERM MVR 27mm in 2013SJ 21([id number]):AVR, SJM 27 :MVR-     ARTHROPLASTY KNEE      right knee     BACK SURGERY  Oct 2015    Fusion  L4-5, laminectomy L2, L3     BYPASS GRAFT ARTERY CORONARY  10/2013    reimplantation of radial artery graft to RCA     C CABG, VEIN, TWO  1/3/06    Left radial to RCA, LIMA to LAD (RA to RCA reimplanted at time of 2013 surg)     CARDIAC CATHERIZATION  11/2005    Stent placed to RCA     CARDIAC CATHERIZATION  04/2013    Occluded RCA, patent LIMA to LAD and radial graft to PDA     CARPAL TUNNEL RELEASE RT/LT  1994     COLONOSCOPY  8-22-11     CYSTOSCOPY FLEXIBLE  10/16/2013    Procedure: CYSTOSCOPY FLEXIBLE;  FLEXIBLE CYSTOSCOPY / DILATION OF URETHRA / INSERTION OF LESLIE;  Surgeon: Cooper Wallace MD;  Location: [facility] OR     ENDOVASCULAR REPAIR ANEURYSM ABDOMINAL AORTA  2006     ENDOVASCULAR REPAIR, INFRARENAL ABDOMINAL AORTIC ANEURYSM/DISSECTION; MODULAR BIFURCATED PROSTHESIS      AAA repair endovascular     ENT SURGERY       GENITOURINARY SURGERY  6/16/08    radioactive seeding     HEAD & NECK SURGERY  1997    vocal cord polypectomy     KNEE SURGERY  2001 Right knee arthroscopy     OPTICAL TRACKING SYSTEM FUSION SPINE POSTERIOR LUMBAR THREE+ LEVELS N/A 10/29/2015    Procedure: OPTICAL TRACKING SYSTEM FUSION SPINE POSTERIOR LUMBAR THREE+ LEVELS;  Surgeon: Walt Garcia MD;  Location:  OR     PROSTATE SURGERY  06/16/2008 Radioactive seeding     PROSTATE SURGERY      radioactive seeding 6/16/08     REPAIR ANEURYSM ABDOMINAL AORTA  06/08     REPAIR VALVE MITRAL  10/16/2013    SJM 21([id number] [id number]):AVR, SJM 27  501:MVRProcedure: REPAIR VALVE MITRAL;  REDO STERNOTOMY/REDO AORTIC VALVE REPLACEMENT/ MITRAL VALVE REPLACEMENT/REIMPLANTATION OF RIGHT CORONARY ARTERY BYPASS WITH RACHAEL ( ON PUMP);  Surgeon: Viet Singh MD;  Location:  OR     REPLACE VALVE AORTIC  10/16/2013    Procedure: REPLACE VALVE AORTIC;;  Surgeon: Viet Singh MD;  Location:  OR     SURGERY GENERAL IP CONSULT  05/2008 Excision aneurysm abdominal aorta     SURGERY GENERAL IP CONSULT  1997 Vocal cord polypectomy     VASCULAR  SURGERY  1968, 1993     varicose vein stripping       SOCIAL HISTORY:  Social History     Social History     Marital status:      Spouse name: N/A     Number of children: N/A     Years of education: N/A     Social History Main Topics     Smoking status: Former Smoker     Packs/day: 1.00     Years: 40.00     Quit date: 10/23/2002     Smokeless tobacco: Never Used     Alcohol use Yes      Comment: a couple beers per week (socially)     Drug use: No     Sexual activity: No     Other Topics Concern     Parent/Sibling W/ Cabg, Mi Or Angioplasty Before 65f 55m? Yes     Brother had bypass at 55     Caffeine Concern No     6-8 cups of half and half per day     Special Diet No     Exercise No     Social History Narrative       FAMILY HISTORY:  Family History   Problem Relation Age of Onset     Coronary Artery Disease Father         CABG     Heart Disease Father         Pacemaker     Other Cancer Daughter      Heart Disease Brother      Other - See Comments Grandchild        MEDS:   Current Outpatient Medications on File Prior to Visit:  aspirin 81 MG EC tablet Take 1 tablet (81 mg) by mouth daily   betamethasone valerate (VALISONE) 0.1 % lotion APPLY TOPICALLY TWICE DAILY PRN   ezetimibe (ZETIA) 10 MG tablet Take 1 tablet (10 mg) by mouth daily   fluocinonide (LIDEX) 0.05 % ointment 2- 30 gram tubes.  Apply twice a day as needed.   furosemide (LASIX) 40 MG tablet Take 0.5 tablets (20 mg) by mouth daily   HERBALS White willow bark, Turmeric, Ginger, botswellia & Yucca mixed powder(1.5 tsp mixed in cranberry juice or orange juice) every day   mesalamine (ASACOL HD) 800 MG EC tablet Take 1 tablet (800 mg) by mouth 2 times daily   metoprolol tartrate (LOPRESSOR) 25 MG tablet Take 1 tablet (25 mg) by mouth 2 times daily   Potassium Chloride ER 20 MEQ TBCR Take 1 tablet (20 mEq) by mouth daily   rosuvastatin (CRESTOR) 40 MG tablet Take 1 tablet (40 mg) by mouth daily   tamsulosin (FLOMAX) 0.4 MG capsule Take 1 capsule (0.4  "mg) by mouth daily   warfarin (COUMADIN) 5 MG tablet Take 1 1/2 tablets (7.5 mg) by mouth TWTFSS; 1 tablet (5 mg) on  OR as directed by INR Clinic   COMPRESSION STOCKINGS 1 each daily (Patient not taking: Reported on 2019)   diazepam (VALIUM) 5 MG tablet Take 1 tablet (5 mg) by mouth every 6 hours as needed for anxiety (Patient not taking: Reported on 2019)   [] mupirocin (BACTROBAN) 2 % ointment Apply topically 3 times daily for 5 days   [] mupirocin (BACTROBAN) 2 % ointment Apply topically 3 times daily for 5 days To the nose for 5 days     No current facility-administered medications on file prior to visit.     ALLERGIES:   Allergies   Allergen Reactions     Bees Anaphylaxis       PHYSICAL EXAM:  Vitals: /70 (BP Location: Right arm, Patient Position: Sitting, Cuff Size: Adult Large)   Pulse 69   Ht 1.664 m (5' 5.5\")   Wt 103.7 kg (228 lb 11.2 oz)   SpO2 94%   BMI 37.48 kg/m    Constitutional:  cooperative;well developed;well nourished;in no acute distress        Skin:  warm and dry to the touch;no apparent skin lesions or masses noted        Head:  normocephalic        Eyes:  conjunctivae and lids unremarkable        ENT:  no pallor or cyanosis        Neck:  JVP normal        Respiratory:  clear to auscultation        Cardiac: regular rhythm occasional premature beats   crisp prosthetic valve sounds            GI:  abdomen soft;BS normoactive obese      Vascular:                                   LLE varicose veins appear improved.  there is some trace resiudal scab formation at the proximal edge of the wrap.  there is no evidence of infection.     Extremities and Musculoskeletal:           Neurological:  no gross motor deficits;affect appropriate            LABS/DATA:  I reviewed the following:  10/12/18 venous comp studies:  CONCLUSION:  1. Right lower extremity veins and Limited iliac vein:  1. Normal compressibility of the deep veins of the right lower  extremity was " demonstrated without signs of venous thrombosis.  2. Right GSV is not visualized from the thigh to ankle, s/p stripping  3. Varicose vein from upper thigh to the ankle, up to 4mm in size with  up to 5.4 sec of reflux     Left lower extremity veins and limited iliac vein:  1. Normal compressibility of the deep veins of the right lower  extremity was demonstrated without signs of venous thrombosis.  2. Left GSV is dilated with severe reflux throughout, from 4.1 to 6.7  sec  3. Significant varicose veins communicating with the GSV, reflux of  1.4 to 3.1 sec        10/12/18 ABIs:  CONCLUSIONS:  1. Physiologic testing with exercise indicates normal resting and  exercise KEERTHI's  2. The patient performed treadmill exercised for 5 minutes at 2.0  miles per hour at 12% elevation.  Symptoms of bilateral leg fatigue at  1:30, bilateral buttock pain at 2:00, test stopped at 2:25 due to  dyspnea.      1/10/19 LLE venous US:  Impression:     Left leg:  Treated AASV is closed.  The treated GSV is partially  closed proximal to distal thigh (reflux 1.2-1.5 sec) and occluded from  the knee to the proximal calf.   Lateral calf varicosities are closed.         ASSESSMENT/PLAN:  1.  Lower back pain as well as bilateral lower extremity discomfort with walking.  ABIs are normal.  He says he has seen ortho/spine and no structural abnormality in his lower back.   - We will proceed with the venous insufficiency evaluation/treatment as noted below.   - Then Dr. Wyatt recommends that he go back to ortho/spine for further evaluation.     2.  Chronic lower extremity venous insufficiency as well as varicose veins status post prior RLE venous stripping.  Venous competency study shows:   On the right, he is s/p vein stripping of the GSV. There was varicose vein from upper thigh to the ankle, up to 4mm in size with up to 5.4 sec of reflux.   On the left, the left GSV is dilated with severe reflux throughout, from 4.1 to 6.7 sec.  Significant varicose  "veins communicating with the GSV, reflux of 1.4 to 3.1 sec  - 1/8/19 s/p Left GSV and AASV ablation w/ sclerotherapy and phlebectomy.   - 1/10/19 LE US reviewed by Dr. Whitman who noted prox left GSV didn't close very well from approach used. Pt may not feel the full symptomatic benefit.   - He had some blistering due to the fluid used in the procedure and wraps were a bit tight, so he had some scabs.  But this is improved now  - He will wait on proceeding with the RLE treatment at this time  - If he notices that the LLE is feeling much improved over the right during the next few months, specifically the summer, he will let us know, and we could proceed with the RLE      3.  History of AAA endovascular repair in 2010.  Follows with Vascular Surgery.     4.  Right common iliac artery aneurysm, followed by Vascular Surgery/IR.     5.  History of valvular heart disease. S/p AVR in 2006 with subsequent perivalvular leak and redo mechanical AVR with concomitant mechanical MVR in 2013.  Last RACHAEL was done 5/21/18 to evaluate for endocarditis, but did show normal gradients.   - Continue Coumadin    6.  Chronic diastolic heart failure.  LVEF is 55%-60%.  BP is controlled.   - Continue metoprolol, Lasix.     7.  Coronary artery disease with CABG (LIMA to LAD and radial graft to RCA) in 2006.   - Continue ASA, BB, statin.     8.  AV conduction disease with \"trifascicular\" block.  He is on low dose metoprolol.    - Monitor will be repeated in 3/2019 to reassess his cardiac rhythm and specifically look for intermittent AV block.      9.  PVCs.  Holter monitor showed 10% PVC burden.  He is on metoprolol 25 mg BID.    - Will not titrate given his AV conduction disease.   - Repeating monitor in 3/2019 as noted above.     10.  Obstructive sleep apnea with use of CPAP.     11.  BRISENO.   - treadmill stress test to evaluate BRISENO showed no e/o chronotropic incompetence or development of high grade AV block with exertion   - GAGE is treated  - " PFTs normal, has seen pulm.  Trial of Trelegy for asthma was prescribed.   - Normal valve gradients  - Preserved LVEF  - Dr. Santo recommended that when/if he needs to be off anticoagulation, we should use that time to perform a RHC.           Follow up:  Repeat monitor 3/2019 per Dr. Pedro Santo in 7/2019 for annual follow up      Warren Scales PA-C

## 2019-02-05 NOTE — LETTER
2019    Tu Reyes MD, MD  [address] [place] [address]    RE: Fausto Farr       Dear Colleague,    I had the pleasure of seeing Fausto Farr in the [facility].        ThVASCULAR and CARDIOLOGY CLINIC PROGRESS NOTE    DOS: 2019      Fausto Farr  : 1940, 78 year old  MRN: [id number]      History: I had the pleasure of following up with Fausto Farr today in the vascular clinic.  He was accompanied by his wife, Ritu.  He is a patient of Dr. Santo, but has also seen Dr. Vasquez () and Dr. Wyatt and Dr. Whitman (vascular clinic).     Ralph is a very pleasant 78 year old man with extensive prior cardiovascular history.     He underwent 2-vessel coronary bypass grafting (LIMA to his LAD and a left radial graft to his right coronary artery) and aortic valve replacement in .  Over the years, he developed a perivalvular leak and also was found to have significant mitral stenosis.  In , he underwent redo mechanical aortic valve replacement and mechanical mitral valve replacement.  He has remained on chronic warfarin.  Followup echocardiograms have shown normal valvular gradients.     He has AV conduction disease and PVCs.  In 2018, Dr. Santo ordered a treadmill stress test to evaluate BRISENO as he wondered whether it may be related to chronotropic incompetence or development of AV block with exertion.  The patient had a stress test on 2018 where he lasted only 4 minutes and 25 seconds on a modified Waqas protocol.  He achieved a peak heart rate of only 106 beats per minute.  He stopped because of back and hip pain rather than fatigue or shortness of breath.  There was no AV block with exertion.    A Holter was done that showed frequent PVCs, burden of approximately 10%.  There were episodes of possible transient second-degree AV block and a 15-beat run of AIVR.  No high-grade bradycardia or tachycardia was noted.   He  saw Dr. Vasquez 9/19/18, and though he was noted to have conduction disease, there was no clear high-grade AV block or sxs to justify a pacemaker implantation. Dr. Vasquez recommended repeating a 10 day Zio Patch in 3/2019 to reassess his cardiac rhythm and specifically look for intermittent AV block.     He has had previous endovascular AAA repair in 2010 (by Dr. Mays from Vascular Surgery as well as IR).  His most recent vascular evaluation was aortoiliac ultrasound performed on 07/10/2018.  That ultrasound was read as showing patent [id number] stent graft with aneurysmal sac size of 7 cm AP x 6 cm transverse, previously 5.8 cm AP x 6.4 cm transverse in 2015.  There is aneurysmal dilatation of the left common iliac artery measuring up to 2.4 cm which has increased from previous ultrasound.      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  He continues to have exertional lower back pain radiating to his hips.  The discomfort starts in the coccyx and buttocks area.  It is usually predictable and is brought on by exertion.  The symptoms are usually better if he is leaning forward, especially if he is supported by a shopping cart.  He can walk with a shopping cart leaning forward in the store without any difficulty.  He denies any rest pain.  His symptoms are suggestive of neurogenic claudication but he reports that he was recently seen by his spine surgeon who performed imaging.  He was told there was no structural abnormality in his lower back.   He did undergo ABIs that showed both normal resting and exercise ABIs.   He had venous competency studies done.  No DVTs bilaterally.    On the right, he is s/p vein stripping of the GSV. There was varicose vein from upper thigh to the ankle, up to 4mm in size with up to 5.4 sec of reflux.   On the left, the left GSV is dilated with severe reflux throughout, from 4.1 to 6.7 sec.  Significant varicose veins communicating with the GSV, reflux of 1.4 to 3.1  sec    He also has obstructive sleep apnea and is on CPAP therapy since late 2017.      For his BRISENO, Dr. Santo ordered the treadmill stress test as noted above.  Given Ralph's prior smoking history, Dr. Santo also ordered PFTS.  The PFTs were done 7/16/18 and showed normal forced vital capacity and FEV1 at 86% of predicted.  FEV1 over FVC ratio is normal at 76%. Lung volumes normal, diffusing capacity is minimally reduced at 75% of predicted.  He did see Dr. Galeana, who noted the unremarkable PFTs.  He did consider asthma may be contributing. So a trial of Trelegy was prescribed.  I do not see he followed up with Dr. Galeana.   Dr. Santo recommended that when/if he needs to be off anticoagulation, we should use that time to perform a RHC.       He met Dr. Whitman 11/21/18.  He arranged for treating the left LE GSV with VenaSeal closure of the left GSV with sclerotherapy and phlebectomy.  This was done 1/8/19.      LLE US 1/10/19:  reviewed by Dr. Whit[place] who noted prox left GSV didn't close very well from approach used. Pt may not feel the full symptomatic benefit.     He called in 2/4/19 noting blistering of his legs.  He went to the Urgency Room and had the wraps loosened.       Interval History:   His appointment was moved up to today from 2/14/19.   Dr. Whitman was able to be present for the visit.   Leg is finally feeling better.  The last scab fell off.  The blistering is gone.  The swelling is better.   He is not certain if the heaviness is improved yet.   He does not want to proceed with the right leg at this time.   No chest pain  No syncope      ROS:  Skin:  Negative     Eyes:  Positive for glasses  ENT:  Positive for hearing loss  Respiratory:  Positive for dyspnea on exertion  Cardiovascular:  Negative    Gastroenterology: Negative    Genitourinary:       Musculoskeletal:       Neurologic:  Negative    Psychiatric:  Negative    Heme/Lymph/Imm:  Positive for allergies  Endocrine:  Negative      PAST MEDICAL  HISTORY:  Past Medical History:   Diagnosis Date     Abdominal pain      Abnormal ECG     RBBB, 1st degree AVB, Left axis deviation     Anemia     currently taking iron     Arrhythmia     pac, pvc     Back pain     since 1980     BPH (benign prostatic hyperplasia)      Bruit      CAD (coronary artery disease)      Cellulitis 10/18/12     Cellulitis 05/2018    GrpB strep LLE cellulitis  negative RACHAEL for veg     Chronic venous insufficiency     bilat lower extremities     Contact dermatitis and other eczema, due to unspecified cause      Diaphragmatic hernia without mention of obstruction or gangrene      Diastolic HF (heart failure) (H)      Gastric ulcer      Glucose intolerance (impaired glucose tolerance)      Heart murmur 9/16/13    valvular heart disease     Hyperlipidaemia      Hypertension 8/6/13     Lumbago      Malaise and fatigue      Metabolic syndrome      Mobitz (type) I (Wenckebach's) atrioventricular block     and RBBB     Nocturia 10/18/12     Nocturia      Nonallopathic lesion of cervical region      Nonallopathic lesion of lumbar region      Nonallopathic lesion of pelvic region, not elsewhere classified      Nonallopathic lesion of rib cage      Nonallopathic lesion of sacral region      GAGE (obstructive sleep apnea)     pt declined cpap     Paroxysmal atrial fibrillation (H) 10/18/12     Prostate cancer (H) 2008    radiation seed, XRT      PVD (peripheral vascular disease) (H)      RBBB      Rotator cuff strain     and sprain     S/P AAA repair      S/P aortic valve replacement 2006    developed perivalve leak and MS, therefore redo surg 2013     S/P CABG (coronary artery bypass graft) 2006    Lima-Lad, RA-Rca     Sciatica of left side     since 2000     Sepsis due to group B Streptococcus (H) 5/19/2018     Ulcerative colitis (H)      Varicose veins of bilateral lower extremities with other complications     s/p RLE vein stripping     Vitamin D deficiency        PAST SURGICAL HISTORY:  Past  Surgical History:   Procedure Laterality Date     AORTIC VALVE REPLACEMENT  1/3/06    redo AVR SJM 21mm and SJM MVR 27mm in 2013SJ 21(AGFN [id number]):AVR, SJM 27  501:MVR-     ARTHROPLASTY KNEE      right knee     BACK SURGERY  Oct 2015    Fusion L4-5, laminectomy L2, L3     BYPASS GRAFT ARTERY CORONARY  10/2013    reimplantation of radial artery graft to RCA     C CABG, VEIN, TWO  1/3/06    Left radial to RCA, LIMA to LAD (RA to RCA reimplanted at time of 2013 surg)     CARDIAC CATHERIZATION  11/2005    Stent placed to RCA     CARDIAC CATHERIZATION  04/2013    Occluded RCA, patent LIMA to LAD and radial graft to PDA     CARPAL TUNNEL RELEASE RT/LT  1994     COLONOSCOPY  8-22-11     CYSTOSCOPY FLEXIBLE  10/16/2013    Procedure: CYSTOSCOPY FLEXIBLE;  FLEXIBLE CYSTOSCOPY / DILATION OF URETHRA / INSERTION OF LESLIE;  Surgeon: Cooper Wallace MD;  Location:  OR     ENDOVASCULAR REPAIR ANEURYSM ABDOMINAL AORTA  2006     ENDOVASCULAR REPAIR, INFRARENAL ABDOMINAL AORTIC ANEURYSM/DISSECTION; MODULAR BIFURCATED PROSTHESIS      AAA repair endovascular     ENT SURGERY       GENITOURINARY SURGERY  6/16/08    radioactive seeding     HEAD & NECK SURGERY  1997    vocal cord polypectomy     KNEE SURGERY  2001 Right knee arthroscopy     OPTICAL TRACKING SYSTEM FUSION SPINE POSTERIOR LUMBAR THREE+ LEVELS N/A 10/29/2015    Procedure: OPTICAL TRACKING SYSTEM FUSION SPINE POSTERIOR LUMBAR THREE+ LEVELS;  Surgeon: Walt Garcia MD;  Location:  OR     PROSTATE SURGERY  06/16/2008 Radioactive seeding     PROSTATE SURGERY      radioactive seeding 6/16/08     REPAIR ANEURYSM ABDOMINAL AORTA  06/08     REPAIR VALVE MITRAL  10/16/2013    [facility] 21(AGFN 756):AVR, [facility] 27  501:MVRProcedure: REPAIR VALVE MITRAL;  REDO STERNOTOMY/REDO AORTIC VALVE REPLACEMENT/ MITRAL VALVE REPLACEMENT/REIMPLANTATION OF RIGHT CORONARY ARTERY BYPASS WITH RACHAEL ( ON PUMP);  Surgeon: Viet Singh MD;  Location:  OR     REPLACE VALVE AORTIC  10/16/2013     Procedure: REPLACE VALVE AORTIC;;  Surgeon: Viet Singh MD;  Location: [facility] OR     SURGERY GENERAL IP CONSULT  05/2008 Excision aneurysm abdominal aorta     SURGERY GENERAL IP CONSULT  1997 Vocal cord polypectomy     VASCULAR SURGERY  1968, 1993     varicose vein stripping       SOCIAL HISTORY:  Social History     Social History     Marital status:      Spouse name: N/A     Number of children: N/A     Years of education: N/A     Social History Main Topics     Smoking status: Former Smoker     Packs/day: 1.00     Years: 40.00     Quit date: 10/23/2002     Smokeless tobacco: Never Used     Alcohol use Yes      Comment: a couple beers per week (socially)     Drug use: No     Sexual activity: No     Other Topics Concern     Parent/Sibling W/ Cabg, Mi Or Angioplasty Before 65f 55m? Yes     Brother had bypass at 55     Caffeine Concern No     6-8 cups of half and half per day     Special Diet No     Exercise No     Social History Narrative       FAMILY HISTORY:  Family History   Problem Relation Age of Onset     Coronary Artery Disease Father         CABG     Heart Disease Father         Pacemaker     Other Cancer Daughter      Heart Disease Brother      Other - See Comments Grandchild        MEDS:   Current Outpatient Medications on File Prior to Visit:  aspirin 81 MG EC tablet Take 1 tablet (81 mg) by mouth daily   betamethasone valerate (VALISONE) 0.1 % lotion APPLY TOPICALLY TWICE DAILY PRN   ezetimibe (ZETIA) 10 MG tablet Take 1 tablet (10 mg) by mouth daily   fluocinonide (LIDEX) 0.05 % ointment 2- 30 gram tubes.  Apply twice a day as needed.   furosemide (LASIX) 40 MG tablet Take 0.5 tablets (20 mg) by mouth daily   HERBALS White willow bark, Turmeric, Ginger, botswellia & Yucca mixed powder(1.5 tsp mixed in cranberry juice or orange juice) every day   mesalamine (ASACOL HD) 800 MG EC tablet Take 1 tablet (800 mg) by mouth 2 times daily   metoprolol tartrate (LOPRESSOR) 25 MG tablet Take 1 tablet  "(25 mg) by mouth 2 times daily   Potassium Chloride ER 20 MEQ TBCR Take 1 tablet (20 mEq) by mouth daily   rosuvastatin (CRESTOR) 40 MG tablet Take 1 tablet (40 mg) by mouth daily   tamsulosin (FLOMAX) 0.4 MG capsule Take 1 capsule (0.4 mg) by mouth daily   warfarin (COUMADIN) 5 MG tablet Take 1 1/2 tablets (7.5 mg) by mouth TWTFSS; 1 tablet (5 mg) on  OR as directed by INR Clinic   COMPRESSION STOCKINGS 1 each daily (Patient not taking: Reported on 2019)   diazepam (VALIUM) 5 MG tablet Take 1 tablet (5 mg) by mouth every 6 hours as needed for anxiety (Patient not taking: Reported on 2019)   [] mupirocin (BACTROBAN) 2 % ointment Apply topically 3 times daily for 5 days   [] mupirocin (BACTROBAN) 2 % ointment Apply topically 3 times daily for 5 days To the nose for 5 days     No current facility-administered medications on file prior to visit.     ALLERGIES:   Allergies   Allergen Reactions     Bees Anaphylaxis       PHYSICAL EXAM:  Vitals: /70 (BP Location: Right arm, Patient Position: Sitting, Cuff Size: Adult Large)   Pulse 69   Ht 1.664 m (5' 5.5\")   Wt 103.7 kg (228 lb 11.2 oz)   SpO2 94%   BMI 37.48 kg/m     Constitutional:  cooperative;well developed;well nourished;in no acute distress        Skin:  warm and dry to the touch;no apparent skin lesions or masses noted        Head:  normocephalic        Eyes:  conjunctivae and lids unremarkable        ENT:  no pallor or cyanosis        Neck:  JVP normal        Respiratory:  clear to auscultation        Cardiac: regular rhythm occasional premature beats   crisp prosthetic valve sounds            GI:  abdomen soft;BS normoactive obese      Vascular:                                   LLE varicose veins appear improved.  there is some trace resiudal scab formation at the proximal edge of the wrap.  there is no evidence of infection.     Extremities and Musculoskeletal:           Neurological:  no gross motor deficits;affect " appropriate            LABS/DATA:  I reviewed the following:  10/12/18 venous comp studies:  CONCLUSION:  1. Right lower extremity veins and Limited iliac vein:  1. Normal compressibility of the deep veins of the right lower  extremity was demonstrated without signs of venous thrombosis.  2. Right GSV is not visualized from the thigh to ankle, s/p stripping  3. Varicose vein from upper thigh to the ankle, up to 4mm in size with  up to 5.4 sec of reflux     Left lower extremity veins and limited iliac vein:  1. Normal compressibility of the deep veins of the right lower  extremity was demonstrated without signs of venous thrombosis.  2. Left GSV is dilated with severe reflux throughout, from 4.1 to 6.7  sec  3. Significant varicose veins communicating with the GSV, reflux of  1.4 to 3.1 sec        10/12/18 ABIs:  CONCLUSIONS:  1. Physiologic testing with exercise indicates normal resting and  exercise KEERTHI's  2. The patient performed treadmill exercised for 5 minutes at 2.0  miles per hour at 12% elevation.  Symptoms of bilateral leg fatigue at  1:30, bilateral buttock pain at 2:00, test stopped at 2:25 due to  dyspnea.      1/10/19 LLE venous US:  Impression:     Left leg:  Treated AASV is closed.  The treated GSV is partially  closed proximal to distal thigh (reflux 1.2-1.5 sec) and occluded from  the knee to the proximal calf.   Lateral calf varicosities are closed.         ASSESSMENT/PLAN:  1.  Lower back pain as well as bilateral lower extremity discomfort with walking.  ABIs are normal.  He says he has seen ortho/spine and no structural abnormality in his lower back.   - We will proceed with the venous insufficiency evaluation/treatment as noted below.   - Then Dr. Wyatt recommends that he go back to ortho/spine for further evaluation.     2.  Chronic lower extremity venous insufficiency as well as varicose veins status post prior RLE venous stripping.  Venous competency study shows:   On the right, he is s/p  "vein stripping of the GSV. There was varicose vein from upper thigh to the ankle, up to 4mm in size with up to 5.4 sec of reflux.   On the left, the left GSV is dilated with severe reflux throughout, from 4.1 to 6.7 sec.  Significant varicose veins communicating with the GSV, reflux of 1.4 to 3.1 sec  - 1/8/19 s/p Left GSV and AASV ablation w/ sclerotherapy and phlebectomy.   - 1/10/19 LE US reviewed by Dr. Whitman who noted prox left GSV didn't close very well from approach used. Pt may not feel the full symptomatic benefit.   - He had some blistering due to the fluid used in the procedure and wraps were a bit tight, so he had some scabs.  But this is improved now  - He will wait on proceeding with the RLE treatment at this time  - If he notices that the LLE is feeling much improved over the right during the next few months, specifically the summer, he will let us know, and we could proceed with the RLE      3.  History of AAA endovascular repair in 2010.  Follows with Vascular Surgery.     4.  Right common iliac artery aneurysm, followed by Vascular Surgery/IR.     5.  History of valvular heart disease. S/p AVR in 2006 with subsequent perivalvular leak and redo mechanical AVR with concomitant mechanical MVR in 2013.  Last RACHAEL was done 5/21/18 to evaluate for endocarditis, but did show normal gradients.   - Continue Coumadin    6.  Chronic diastolic heart failure.  LVEF is 55%-60%.  BP is controlled.   - Continue metoprolol, Lasix.     7.  Coronary artery disease with CABG (LIMA to LAD and radial graft to RCA) in 2006.   - Continue ASA, BB, statin.     8.  AV conduction disease with \"trifascicular\" block.  He is on low dose metoprolol.    - Monitor will be repeated in 3/2019 to reassess his cardiac rhythm and specifically look for intermittent AV block.      9.  PVCs.  Holter monitor showed 10% PVC burden.  He is on metoprolol 25 mg BID.    - Will not titrate given his AV conduction disease.   - Repeating monitor in " 3/2019 as noted above.     10.  Obstructive sleep apnea with use of CPAP.     11.  BRISENO.   - treadmill stress test to evaluate BRISENO showed no e/o chronotropic incompetence or development of high grade AV block with exertion   - GAGE is treated  - PFTs normal, has seen pulm.  Trial of Trelegy for asthma was prescribed.   - Normal valve gradients  - Preserved LVEF  - Dr. Santo recommended that when/if he needs to be off anticoagulation, we should use that time to perform a RHC.           Follow up:  Repeat monitor 3/2019 per Dr. Pedro Santo in 7/2019 for annual follow up      KAYLEE KnightCank you for allowing me to participate in the care of your patient.      Sincerely,     Warren Scales PA-C     [facility]    cc:   No referring provider defined for this encounter.

## 2019-02-05 NOTE — PATIENT INSTRUCTIONS
The left leg is looking better.   There are no signs of infection.   If you notice that the left is feeling much better than the right over the next few months, please let us know if you want to go ahead with treating the right leg.

## 2019-02-05 NOTE — LETTER
2019    Tu Reyes MD, MD  [address] [place] [address]    RE: Fausto Carson Yordan       Dear Colleague,    I had the pleasure of seeing Fausto Farr in the [facility].        VASCULAR and CARDIOLOGY CLINIC PROGRESS NOTE    DOS: 2019      Fausto Farr  : 1940, 78 year old  MRN: [id number]      History: I had the pleasure of following up with Fausto Farr today in the vascular clinic.  He was accompanied by his wife, Ritu.  He is a patient of Dr. Santo, but has also seen Dr. Vasquez () and Dr. Wyatt and Dr. Whitman (vascular clinic).     Ralph is a very pleasant 78 year old man with extensive prior cardiovascular history.     He underwent 2-vessel coronary bypass grafting (LIMA to his LAD and a left radial graft to his right coronary artery) and aortic valve replacement in .  Over the years, he developed a perivalvular leak and also was found to have significant mitral stenosis.  In , he underwent redo mechanical aortic valve replacement and mechanical mitral valve replacement.  He has remained on chronic warfarin.  Followup echocardiograms have shown normal valvular gradients.     He has AV conduction disease and PVCs.  In 2018, Dr. Santo ordered a treadmill stress test to evaluate BRISENO as he wondered whether it may be related to chronotropic incompetence or development of AV block with exertion.  The patient had a stress test on 2018 where he lasted only 4 minutes and 25 seconds on a modified Waqas protocol.  He achieved a peak heart rate of only 106 beats per minute.  He stopped because of back and hip pain rather than fatigue or shortness of breath.  There was no AV block with exertion.    A Holter was done that showed frequent PVCs, burden of approximately 10%.  There were episodes of possible transient second-degree AV block and a 15-beat run of AIVR.  No high-grade bradycardia or tachycardia was noted.   He saw  Dr. Vasquez 9/19/18, and though he was noted to have conduction disease, there was no clear high-grade AV block or sxs to justify a pacemaker implantation. Dr. Vasquez recommended repeating a 10 day Zio Patch in 3/2019 to reassess his cardiac rhythm and specifically look for intermittent AV block.     He has had previous endovascular AAA repair in 2010 (by Dr. Mays from Vascular Surgery as well as IR).  His most recent vascular evaluation was aortoiliac ultrasound performed on 07/10/2018.  That ultrasound was read as showing patent [id number] stent graft with aneurysmal sac size of 7 cm AP x 6 cm transverse, previously 5.8 cm AP x 6.4 cm transverse in 2015.  There is aneurysmal dilatation of the left common iliac artery measuring up to 2.4 cm which has increased from previous ultrasound.      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  He continues to have exertional lower back pain radiating to his hips.  The discomfort starts in the coccyx and buttocks area.  It is usually predictable and is brought on by exertion.  The symptoms are usually better if he is leaning forward, especially if he is supported by a shopping cart.  He can walk with a shopping cart leaning forward in the store without any difficulty.  He denies any rest pain.  His symptoms are suggestive of neurogenic claudication but he reports that he was recently seen by his spine surgeon who performed imaging.  He was told there was no structural abnormality in his lower back.   He did undergo ABIs that showed both normal resting and exercise ABIs.   He had venous competency studies done.  No DVTs bilaterally.    On the right, he is s/p vein stripping of the GSV. There was varicose vein from upper thigh to the ankle, up to 4mm in size with up to 5.4 sec of reflux.   On the left, the left GSV is dilated with severe reflux throughout, from 4.1 to 6.7 sec.  Significant varicose veins communicating with the GSV, reflux of 1.4 to 3.1  sec    He also has obstructive sleep apnea and is on CPAP therapy since late 2017.      For his BRISENO, Dr. Santo ordered the treadmill stress test as noted above.  Given Ralph's prior smoking history, Dr. Santo also ordered PFTS.  The PFTs were done 7/16/18 and showed normal forced vital capacity and FEV1 at 86% of predicted.  FEV1 over FVC ratio is normal at 76%. Lung volumes normal, diffusing capacity is minimally reduced at 75% of predicted.  He did see Dr. Galeana, who noted the unremarkable PFTs.  He did consider asthma may be contributing. So a trial of Trelegy was prescribed.  I do not see he followed up with Dr. Galeana.   Dr. Santo recommended that when/if he needs to be off anticoagulation, we should use that time to perform a RHC.       He met Dr. Whitman 11/21/18.  He arranged for treating the left LE GSV with VenaSeal closure of the left GSV with sclerotherapy and phlebectomy.  This was done 1/8/19.      LLE US 1/10/19:  reviewed by Dr. [place] who noted prox left GSV didn't close very well from approach used. Pt may not feel the full symptomatic benefit.     He called in 2/4/19 noting blistering of his legs.  He went to the Urgency Room and had the wraps loosened.       Interval History:   His appointment was moved up to today from 2/14/19.   Dr. Whitman was able to be present for the visit.   Leg is finally feeling better.  The last scab fell off.  The blistering is gone.  The swelling is better.   He is not certain if the heaviness is improved yet.   He does not want to proceed with the right leg at this time.   No chest pain  No syncope      ROS:  Skin:  Negative     Eyes:  Positive for glasses  ENT:  Positive for hearing loss  Respiratory:  Positive for dyspnea on exertion  Cardiovascular:  Negative    Gastroenterology: Negative    Genitourinary:       Musculoskeletal:       Neurologic:  Negative    Psychiatric:  Negative    Heme/Lymph/Imm:  Positive for allergies  Endocrine:  Negative      PAST MEDICAL  HISTORY:  Past Medical History:   Diagnosis Date     Abdominal pain      Abnormal ECG     RBBB, 1st degree AVB, Left axis deviation     Anemia     currently taking iron     Arrhythmia     pac, pvc     Back pain     since 1980     BPH (benign prostatic hyperplasia)      Bruit      CAD (coronary artery disease)      Cellulitis 10/18/12     Cellulitis 05/2018    GrpB strep LLE cellulitis  negative RACHAEL for veg     Chronic venous insufficiency     bilat lower extremities     Contact dermatitis and other eczema, due to unspecified cause      Diaphragmatic hernia without mention of obstruction or gangrene      Diastolic HF (heart failure) (H)      Gastric ulcer      Glucose intolerance (impaired glucose tolerance)      Heart murmur 9/16/13    valvular heart disease     Hyperlipidaemia      Hypertension 8/6/13     Lumbago      Malaise and fatigue      Metabolic syndrome      Mobitz (type) I (Wenckebach's) atrioventricular block     and RBBB     Nocturia 10/18/12     Nocturia      Nonallopathic lesion of cervical region      Nonallopathic lesion of lumbar region      Nonallopathic lesion of pelvic region, not elsewhere classified      Nonallopathic lesion of rib cage      Nonallopathic lesion of sacral region      GAGE (obstructive sleep apnea)     pt declined cpap     Paroxysmal atrial fibrillation (H) 10/18/12     Prostate cancer (H) 2008    radiation seed, XRT      PVD (peripheral vascular disease) (H)      RBBB      Rotator cuff strain     and sprain     S/P AAA repair      S/P aortic valve replacement 2006    developed perivalve leak and MS, therefore redo surg 2013     S/P CABG (coronary artery bypass graft) 2006    Lima-Lad, RA-Rca     Sciatica of left side     since 2000     Sepsis due to group B Streptococcus (H) 5/19/2018     Ulcerative colitis (H)      Varicose veins of bilateral lower extremities with other complications     s/p RLE vein stripping     Vitamin D deficiency        PAST SURGICAL HISTORY:  Past  Surgical History:   Procedure Laterality Date     AORTIC VALVE REPLACEMENT  1/3/06    redo AVR SJM 21mm and SJM MVR 27mm in 2013SJ 21(AGFN 756):AVR, SJM 27  501:MVR-     ARTHROPLASTY KNEE      right knee     BACK SURGERY  Oct 2015    Fusion L4-5, laminectomy L2, L3     BYPASS GRAFT ARTERY CORONARY  10/2013    reimplantation of radial artery graft to RCA     C CABG, VEIN, TWO  1/3/06    Left radial to RCA, LIMA to LAD (RA to RCA reimplanted at time of 2013 surg)     CARDIAC CATHERIZATION  11/2005    Stent placed to RCA     CARDIAC CATHERIZATION  04/2013    Occluded RCA, patent LIMA to LAD and radial graft to PDA     CARPAL TUNNEL RELEASE RT/LT  1994     COLONOSCOPY  8-22-11     CYSTOSCOPY FLEXIBLE  10/16/2013    Procedure: CYSTOSCOPY FLEXIBLE;  FLEXIBLE CYSTOSCOPY / DILATION OF URETHRA / INSERTION OF LESLIE;  Surgeon: Cooper Wallace MD;  Location:  OR     ENDOVASCULAR REPAIR ANEURYSM ABDOMINAL AORTA  2006     ENDOVASCULAR REPAIR, INFRARENAL ABDOMINAL AORTIC ANEURYSM/DISSECTION; MODULAR BIFURCATED PROSTHESIS      AAA repair endovascular     ENT SURGERY       GENITOURINARY SURGERY  6/16/08    radioactive seeding     HEAD & NECK SURGERY  1997    vocal cord polypectomy     KNEE SURGERY  2001 Right knee arthroscopy     OPTICAL TRACKING SYSTEM FUSION SPINE POSTERIOR LUMBAR THREE+ LEVELS N/A 10/29/2015    Procedure: OPTICAL TRACKING SYSTEM FUSION SPINE POSTERIOR LUMBAR THREE+ LEVELS;  Surgeon: Walt Garcia MD;  Location:  OR     PROSTATE SURGERY  06/16/2008 Radioactive seeding     PROSTATE SURGERY      radioactive seeding 6/16/08     REPAIR ANEURYSM ABDOMINAL AORTA  06/08     REPAIR VALVE MITRAL  10/16/2013    [facility] 21(AGFN 756):AVR, [facility] 27  501:MVRProcedure: REPAIR VALVE MITRAL;  REDO STERNOTOMY/REDO AORTIC VALVE REPLACEMENT/ MITRAL VALVE REPLACEMENT/REIMPLANTATION OF RIGHT CORONARY ARTERY BYPASS WITH RACHAEL ( ON PUMP);  Surgeon: Viet Singh MD;  Location:  OR     REPLACE VALVE AORTIC  10/16/2013     Procedure: REPLACE VALVE AORTIC;;  Surgeon: Viet Singh MD;  Location: [facility] OR     SURGERY GENERAL IP CONSULT  05/2008 Excision aneurysm abdominal aorta     SURGERY GENERAL IP CONSULT  1997 Vocal cord polypectomy     VASCULAR SURGERY  1968, 1993     varicose vein stripping       SOCIAL HISTORY:  Social History     Social History     Marital status:      Spouse name: N/A     Number of children: N/A     Years of education: N/A     Social History Main Topics     Smoking status: Former Smoker     Packs/day: 1.00     Years: 40.00     Quit date: 10/23/2002     Smokeless tobacco: Never Used     Alcohol use Yes      Comment: a couple beers per week (socially)     Drug use: No     Sexual activity: No     Other Topics Concern     Parent/Sibling W/ Cabg, Mi Or Angioplasty Before 65f 55m? Yes     Brother had bypass at 55     Caffeine Concern No     6-8 cups of half and half per day     Special Diet No     Exercise No     Social History Narrative       FAMILY HISTORY:  Family History   Problem Relation Age of Onset     Coronary Artery Disease Father         CABG     Heart Disease Father         Pacemaker     Other Cancer Daughter      Heart Disease Brother      Other - See Comments Grandchild        MEDS:   Current Outpatient Medications on File Prior to Visit:  aspirin 81 MG EC tablet Take 1 tablet (81 mg) by mouth daily   betamethasone valerate (VALISONE) 0.1 % lotion APPLY TOPICALLY TWICE DAILY PRN   ezetimibe (ZETIA) 10 MG tablet Take 1 tablet (10 mg) by mouth daily   fluocinonide (LIDEX) 0.05 % ointment 2- 30 gram tubes.  Apply twice a day as needed.   furosemide (LASIX) 40 MG tablet Take 0.5 tablets (20 mg) by mouth daily   HERBALS White willow bark, Turmeric, Ginger, botswellia & Yucca mixed powder(1.5 tsp mixed in cranberry juice or orange juice) every day   mesalamine (ASACOL HD) 800 MG EC tablet Take 1 tablet (800 mg) by mouth 2 times daily   metoprolol tartrate (LOPRESSOR) 25 MG tablet Take 1 tablet  "(25 mg) by mouth 2 times daily   Potassium Chloride ER 20 MEQ TBCR Take 1 tablet (20 mEq) by mouth daily   rosuvastatin (CRESTOR) 40 MG tablet Take 1 tablet (40 mg) by mouth daily   tamsulosin (FLOMAX) 0.4 MG capsule Take 1 capsule (0.4 mg) by mouth daily   warfarin (COUMADIN) 5 MG tablet Take 1 1/2 tablets (7.5 mg) by mouth TWTFSS; 1 tablet (5 mg) on  OR as directed by INR Clinic   COMPRESSION STOCKINGS 1 each daily (Patient not taking: Reported on 2019)   diazepam (VALIUM) 5 MG tablet Take 1 tablet (5 mg) by mouth every 6 hours as needed for anxiety (Patient not taking: Reported on 2019)   [] mupirocin (BACTROBAN) 2 % ointment Apply topically 3 times daily for 5 days   [] mupirocin (BACTROBAN) 2 % ointment Apply topically 3 times daily for 5 days To the nose for 5 days     No current facility-administered medications on file prior to visit.     ALLERGIES:   Allergies   Allergen Reactions     Bees Anaphylaxis       PHYSICAL EXAM:  Vitals: /70 (BP Location: Right arm, Patient Position: Sitting, Cuff Size: Adult Large)   Pulse 69   Ht 1.664 m (5' 5.5\")   Wt 103.7 kg (228 lb 11.2 oz)   SpO2 94%   BMI 37.48 kg/m     Constitutional:  cooperative;well developed;well nourished;in no acute distress        Skin:  warm and dry to the touch;no apparent skin lesions or masses noted        Head:  normocephalic        Eyes:  conjunctivae and lids unremarkable        ENT:  no pallor or cyanosis        Neck:  JVP normal        Respiratory:  clear to auscultation        Cardiac: regular rhythm occasional premature beats   crisp prosthetic valve sounds            GI:  abdomen soft;BS normoactive obese      Vascular:                                   LLE varicose veins appear improved.  there is some trace resiudal scab formation at the proximal edge of the wrap.  there is no evidence of infection.     Extremities and Musculoskeletal:           Neurological:  no gross motor deficits;affect " appropriate            LABS/DATA:  I reviewed the following:  10/12/18 venous comp studies:  CONCLUSION:  1. Right lower extremity veins and Limited iliac vein:  1. Normal compressibility of the deep veins of the right lower  extremity was demonstrated without signs of venous thrombosis.  2. Right GSV is not visualized from the thigh to ankle, s/p stripping  3. Varicose vein from upper thigh to the ankle, up to 4mm in size with  up to 5.4 sec of reflux     Left lower extremity veins and limited iliac vein:  1. Normal compressibility of the deep veins of the right lower  extremity was demonstrated without signs of venous thrombosis.  2. Left GSV is dilated with severe reflux throughout, from 4.1 to 6.7  sec  3. Significant varicose veins communicating with the GSV, reflux of  1.4 to 3.1 sec        10/12/18 ABIs:  CONCLUSIONS:  1. Physiologic testing with exercise indicates normal resting and  exercise KEERTHI's  2. The patient performed treadmill exercised for 5 minutes at 2.0  miles per hour at 12% elevation.  Symptoms of bilateral leg fatigue at  1:30, bilateral buttock pain at 2:00, test stopped at 2:25 due to  dyspnea.      1/10/19 LLE venous US:  Impression:     Left leg:  Treated AASV is closed.  The treated GSV is partially  closed proximal to distal thigh (reflux 1.2-1.5 sec) and occluded from  the knee to the proximal calf.   Lateral calf varicosities are closed.         ASSESSMENT/PLAN:  1.  Lower back pain as well as bilateral lower extremity discomfort with walking.  ABIs are normal.  He says he has seen ortho/spine and no structural abnormality in his lower back.   - We will proceed with the venous insufficiency evaluation/treatment as noted below.   - Then Dr. Wyatt recommends that he go back to ortho/spine for further evaluation.     2.  Chronic lower extremity venous insufficiency as well as varicose veins status post prior RLE venous stripping.  Venous competency study shows:   On the right, he is s/p  "vein stripping of the GSV. There was varicose vein from upper thigh to the ankle, up to 4mm in size with up to 5.4 sec of reflux.   On the left, the left GSV is dilated with severe reflux throughout, from 4.1 to 6.7 sec.  Significant varicose veins communicating with the GSV, reflux of 1.4 to 3.1 sec  - 1/8/19 s/p Left GSV and AASV ablation w/ sclerotherapy and phlebectomy.   - 1/10/19 LE US reviewed by Dr. Whitman who noted prox left GSV didn't close very well from approach used. Pt may not feel the full symptomatic benefit.   - He had some blistering due to the fluid used in the procedure and wraps were a bit tight, so he had some scabs.  But this is improved now  - He will wait on proceeding with the RLE treatment at this time  - If he notices that the LLE is feeling much improved over the right during the next few months, specifically the summer, he will let us know, and we could proceed with the RLE      3.  History of AAA endovascular repair in 2010.  Follows with Vascular Surgery.     4.  Right common iliac artery aneurysm, followed by Vascular Surgery/IR.     5.  History of valvular heart disease. S/p AVR in 2006 with subsequent perivalvular leak and redo mechanical AVR with concomitant mechanical MVR in 2013.  Last RACHAEL was done 5/21/18 to evaluate for endocarditis, but did show normal gradients.   - Continue Coumadin    6.  Chronic diastolic heart failure.  LVEF is 55%-60%.  BP is controlled.   - Continue metoprolol, Lasix.     7.  Coronary artery disease with CABG (LIMA to LAD and radial graft to RCA) in 2006.   - Continue ASA, BB, statin.     8.  AV conduction disease with \"trifascicular\" block.  He is on low dose metoprolol.    - Monitor will be repeated in 3/2019 to reassess his cardiac rhythm and specifically look for intermittent AV block.      9.  PVCs.  Holter monitor showed 10% PVC burden.  He is on metoprolol 25 mg BID.    - Will not titrate given his AV conduction disease.   - Repeating monitor in " 3/2019 as noted above.     10.  Obstructive sleep apnea with use of CPAP.     11.  BRISENO.   - treadmill stress test to evaluate BRISENO showed no e/o chronotropic incompetence or development of high grade AV block with exertion   - GAGE is treated  - PFTs normal, has seen pulm.  Trial of Trelegy for asthma was prescribed.   - Normal valve gradients  - Preserved LVEF  - Dr. Santo recommended that when/if he needs to be off anticoagulation, we should use that time to perform a RHC.           Follow up:  Repeat monitor 3/2019 per Dr. Pedro Santo in 7/2019 for annual follow up      Thank you for allowing me to participate in the care of your patient.    Sincerely,     Warren Scales PA-C     [facility]

## 2019-02-08 ENCOUNTER — ANTICOAGULATION THERAPY VISIT (OUTPATIENT)
Dept: NURSING | Facility: CLINIC | Age: 79
End: 2019-02-08
Payer: MEDICARE

## 2019-02-08 DIAGNOSIS — Z95.2 S/P MITRAL VALVE REPLACEMENT: ICD-10-CM

## 2019-02-08 DIAGNOSIS — I48.91 AF (ATRIAL FIBRILLATION) (H): ICD-10-CM

## 2019-02-08 DIAGNOSIS — Z79.01 LONG TERM CURRENT USE OF ANTICOAGULANT THERAPY: Primary | ICD-10-CM

## 2019-02-08 LAB — INR POINT OF CARE: 2.5 (ref 0.86–1.14)

## 2019-02-08 PROCEDURE — 85610 PROTHROMBIN TIME: CPT | Mod: QW

## 2019-02-08 PROCEDURE — 99207 ZZC NO CHARGE NURSE ONLY: CPT

## 2019-02-08 PROCEDURE — 36416 COLLJ CAPILLARY BLOOD SPEC: CPT

## 2019-02-08 NOTE — PROGRESS NOTES
ANTICOAGULATION FOLLOW-UP CLINIC VISIT    Patient Name:  Fausto Farr  Date:  2019  Contact Type:  Face to Face    SUBJECTIVE:     Patient Findings     Positives:   No Problem Findings    Comments:   Going on a trip to [place] and [place].  Will be gone  to 3/16/19.           OBJECTIVE    INR Protime   Date Value Ref Range Status   2019 2.5 (A) 0.86 - 1.14 Final       ASSESSMENT / PLAN  INR assessment THER    Recheck INR In: 5 WEEKS    INR Location Clinic      Anticoagulation Summary  As of 2019    INR goal:   2.5-3.5   TTR:   53.1 % (2.8 y)   INR used for dosin.5 (2019)   Warfarin maintenance plan:   5 mg (5 mg x 1) every Mon, Fri; 7.5 mg (5 mg x 1.5) all other days   Full warfarin instructions:   5 mg every Mon, Fri; 7.5 mg all other days   Weekly warfarin total:   47.5 mg   No change documented:   Caryn Campos RN   Plan last modified:   Caryn Campos RN (1/3/2019)   Next INR check:   3/18/2019   Priority:   INR   Target end date:   Indefinite    Indications    AF (atrial fibrillation) (H) [I48.91]  S/P mitral valve replacement [Z95.2]  Long term current use of anticoagulant therapy [Z79.01]             Anticoagulation Episode Summary     INR check location:       Preferred lab:       Send INR reminders to:   [facility]    Comments:   5mg tabs - andrade dose // transfer from [facility] // [facility] // CALENDAR      Anticoagulation Care Providers     Provider Role Specialty Phone number    Tu Reyes MD  Internal Medicine [phone]            See the Encounter Report to view Anticoagulation Flowsheet and Dosing Calendar (Go to Encounters tab in chart review, and find the Anticoagulation Therapy Visit)        Caryn Campos RN

## 2019-03-18 ENCOUNTER — ANTICOAGULATION THERAPY VISIT (OUTPATIENT)
Dept: NURSING | Facility: CLINIC | Age: 79
End: 2019-03-18
Payer: MEDICARE

## 2019-03-18 DIAGNOSIS — Z79.01 LONG TERM CURRENT USE OF ANTICOAGULANT THERAPY: ICD-10-CM

## 2019-03-18 DIAGNOSIS — I48.91 AF (ATRIAL FIBRILLATION) (H): ICD-10-CM

## 2019-03-18 DIAGNOSIS — Z95.2 S/P MITRAL VALVE REPLACEMENT: ICD-10-CM

## 2019-03-18 LAB — INR POINT OF CARE: 4.2 (ref 0.86–1.14)

## 2019-03-18 PROCEDURE — 85610 PROTHROMBIN TIME: CPT | Mod: QW

## 2019-03-18 PROCEDURE — 36416 COLLJ CAPILLARY BLOOD SPEC: CPT

## 2019-03-18 PROCEDURE — 99207 ZZC NO CHARGE NURSE ONLY: CPT

## 2019-03-18 NOTE — PROGRESS NOTES
ANTICOAGULATION FOLLOW-UP CLINIC VISIT    Patient Name:  Fausto Farr  Date:  3/18/2019  Contact Type:  Face to Face    SUBJECTIVE:     Patient Findings     Positives:   Change in diet/appetite    Comments:   Returned from [place] so eating some differently.  Did a lot of walking and took a lot of Tylenol from legs being sore did take ibuprofen once 2-3 days ago             OBJECTIVE    INR Protime   Date Value Ref Range Status   2019 4.2 (A) 0.86 - 1.14 Final       ASSESSMENT / PLAN  INR assessment SUPRA    Recheck INR In: 2 WEEKS    INR Location Clinic      Anticoagulation Summary  As of 3/18/2019    INR goal:   2.5-3.5   TTR:   53.3 % (2.9 y)   INR used for dosin.2! (3/18/2019)   Warfarin maintenance plan:   5 mg (5 mg x 1) every Mon, Fri; 7.5 mg (5 mg x 1.5) all other days   Full warfarin instructions:   3/18: Hold; Otherwise 5 mg every Mon, Fri; 7.5 mg all other days   Weekly warfarin total:   47.5 mg   Plan last modified:   Caryn Campos RN (1/3/2019)   Next INR check:   2019   Priority:   INR   Target end date:   Indefinite    Indications    AF (atrial fibrillation) (H) [I48.91]  S/P mitral valve replacement [Z95.2]  Long term current use of anticoagulant therapy [Z79.01]             Anticoagulation Episode Summary     INR check location:       Preferred lab:       Send INR reminders to:   [facility]    Comments:   5mg tabs - andrade dose // transfer from [facility] // [facility] // CALENDAR      Anticoagulation Care Providers     Provider Role Specialty Phone number    Tu Reyes MD  Internal Medicine [phone]            See the Encounter Report to view Anticoagulation Flowsheet and Dosing Calendar (Go to Encounters tab in chart review, and find the Anticoagulation Therapy Visit)    Patient INR is supra therapeutic today.  Patient will adjust dosing today by holding today's dose decreasing weekly total by 10% and then resume maintenance dosing of 47.5 mg and  follow up in 2 weeks or sooner if there are any concerns or problems.    Signs of bleeding and when appropriate to seek care for symptoms reviewed.    Adjustment Rational:   Dosage adjustment made based on physician directed care plan.      Cecy Tsai RN

## 2019-03-21 ENCOUNTER — TELEPHONE (OUTPATIENT)
Dept: PEDIATRICS | Facility: CLINIC | Age: 79
End: 2019-03-21

## 2019-03-21 DIAGNOSIS — K51.20 ULCERATIVE PROCTITIS WITHOUT COMPLICATION (H): ICD-10-CM

## 2019-03-21 DIAGNOSIS — E78.2 MIXED HYPERLIPIDEMIA: ICD-10-CM

## 2019-03-21 RX ORDER — EZETIMIBE 10 MG/1
10 TABLET ORAL DAILY
Qty: 90 TABLET | Refills: 0 | Status: SHIPPED | OUTPATIENT
Start: 2019-03-21 | End: 2019-05-30

## 2019-03-21 RX ORDER — MESALAMINE 800 MG/1
1 TABLET, DELAYED RELEASE ORAL 2 TIMES DAILY
Qty: 180 TABLET | Refills: 0 | Status: SHIPPED | OUTPATIENT
Start: 2019-03-21 | End: 2019-05-30

## 2019-03-21 NOTE — TELEPHONE ENCOUNTER
Pended meds. LOV with  was on 5/31/18. Last routine px was on 5/8/18. Has been f/u cardiologist regularly(LOV 2/5/19).    Please sign, if appropriate. Also advise when is pt due for px & fasting labs. Thanks.    Zetia 10 mg qd      Last Written Prescription Date:  5/8/18  Last Fill Quantity: 90,   # refills: 3  Asacol 800 mg bid      Last Written Prescription Date:  5/8/18  Last Fill Quantity: 180,   # refills: 3  Last Office Visit: 5/31/18    Nikolay, RN  Triage Nurse

## 2019-03-21 NOTE — TELEPHONE ENCOUNTER
Patient called clinic and  needs all hard scripts for these two meds:  mesalamine (ASACOL HD) 800 MG EC tablet    And     ezetimibe (ZETIA) 10 MG tablet    Pt is trying to order in [place] to save himself $800.

## 2019-03-21 NOTE — TELEPHONE ENCOUNTER
Mailed both rx to pt's home address as per his request. Scheduled px & fasting labs on 5/28.    Nikolay, RN  Triage Nurse

## 2019-03-25 ENCOUNTER — HOSPITAL ENCOUNTER (OUTPATIENT)
Dept: CARDIOLOGY | Facility: CLINIC | Age: 79
Discharge: HOME | End: 2019-03-25
Attending: INTERNAL MEDICINE | Admitting: INTERNAL MEDICINE
Payer: MEDICARE

## 2019-03-25 DIAGNOSIS — I49.3 PVC'S (PREMATURE VENTRICULAR CONTRACTIONS): ICD-10-CM

## 2019-03-25 PROCEDURE — 0296T ZIO PATCH HOLTER ADULT PEDIATRIC GREATER THAN 48 HRS: CPT

## 2019-03-25 PROCEDURE — 0298T ZIO PATCH HOLTER ADULT PEDIATRIC GREATER THAN 48 HRS: CPT | Performed by: INTERNAL MEDICINE

## 2019-04-01 ENCOUNTER — ANTICOAGULATION THERAPY VISIT (OUTPATIENT)
Dept: NURSING | Facility: CLINIC | Age: 79
End: 2019-04-01
Payer: MEDICARE

## 2019-04-01 DIAGNOSIS — I48.91 AF (ATRIAL FIBRILLATION) (H): ICD-10-CM

## 2019-04-01 DIAGNOSIS — Z95.2 S/P MITRAL VALVE REPLACEMENT: ICD-10-CM

## 2019-04-01 DIAGNOSIS — Z79.01 LONG TERM CURRENT USE OF ANTICOAGULANT THERAPY: ICD-10-CM

## 2019-04-01 LAB — INR POINT OF CARE: 3.1 (ref 0.86–1.14)

## 2019-04-01 PROCEDURE — 99207 ZZC NO CHARGE NURSE ONLY: CPT

## 2019-04-01 PROCEDURE — 85610 PROTHROMBIN TIME: CPT | Mod: QW

## 2019-04-01 PROCEDURE — 36416 COLLJ CAPILLARY BLOOD SPEC: CPT

## 2019-04-01 NOTE — PROGRESS NOTES
ANTICOAGULATION FOLLOW-UP CLINIC VISIT    Patient Name:  Fausto Farr  Date:  4/1/2019  Contact Type:  Face to Face    SUBJECTIVE:     Patient Findings     Comments:   The patient was assessed for diet, medication, and activity level changes, missed or extra doses, bruising or bleeding, with no problem findings.           OBJECTIVE    INR Protime   Date Value Ref Range Status   04/01/2019 3.1 (A) 0.86 - 1.14 Final       ASSESSMENT / PLAN  INR assessment THER    Recheck INR In: 4 WEEKS    INR Location Clinic      Anticoagulation Summary  As of 4/1/2019    INR goal:   2.5-3.5   TTR:   53.0 % (2.9 y)   INR used for dosing:   3.1 (4/1/2019)   Warfarin maintenance plan:   5 mg (5 mg x 1) every Mon, Fri; 7.5 mg (5 mg x 1.5) all other days   Full warfarin instructions:   5 mg every Mon, Fri; 7.5 mg all other days   Weekly warfarin total:   47.5 mg   No change documented:   Cecy Simon RN   Plan last modified:   Caryn Campos RN (1/3/2019)   Next INR check:   4/29/2019   Priority:   INR   Target end date:   Indefinite    Indications    AF (atrial fibrillation) (H) [I48.91]  S/P mitral valve replacement [Z95.2]  Long term current use of anticoagulant therapy [Z79.01]             Anticoagulation Episode Summary     INR check location:       Preferred lab:       Send INR reminders to:   [facility]    Comments:   5mg tabs - andrade dose // transfer from [facility] // [facility] // CALENDAR      Anticoagulation Care Providers     Provider Role Specialty Phone number    Tu Reyes MD  Internal Medicine [phone]            See the Encounter Report to view Anticoagulation Flowsheet and Dosing Calendar (Go to Encounters tab in chart review, and find the Anticoagulation Therapy Visit)    Patient INR is therapeutic.  Patient will continue to take 47.5 mg weekly dosing and follow up in 4 weeks or sooner for any problems or concerns.        Cecy Tsai RN

## 2019-04-10 ENCOUNTER — TELEPHONE (OUTPATIENT)
Dept: CARDIOLOGY | Facility: CLINIC | Age: 79
End: 2019-04-10

## 2019-04-10 NOTE — TELEPHONE ENCOUNTER
"Per Dr Vasquez note:  \"He is in persistent aflutter and also has NSVT.   Please make apt for patient to see me in the EP clinic to discuss further care.  Needs 12-lead ECG that day. \".   Called pt, he agreed to appt on 4/15/19 at 1:15 pm. Verbalized understanding.   Kylie 4/10/19 2:08 pm  "

## 2019-04-15 ENCOUNTER — OFFICE VISIT (OUTPATIENT)
Dept: CARDIOLOGY | Facility: CLINIC | Age: 79
End: 2019-04-15
Payer: MEDICARE

## 2019-04-15 VITALS
DIASTOLIC BLOOD PRESSURE: 78 MMHG | SYSTOLIC BLOOD PRESSURE: 144 MMHG | HEIGHT: 65 IN | BODY MASS INDEX: 37.99 KG/M2 | WEIGHT: 228 LBS | OXYGEN SATURATION: 92 % | HEART RATE: 79 BPM

## 2019-04-15 DIAGNOSIS — I47.29 NSVT (NONSUSTAINED VENTRICULAR TACHYCARDIA) (H): ICD-10-CM

## 2019-04-15 DIAGNOSIS — I48.3 TYPICAL ATRIAL FLUTTER (H): Primary | ICD-10-CM

## 2019-04-15 DIAGNOSIS — R94.31 ABNORMAL ELECTROCARDIOGRAM: ICD-10-CM

## 2019-04-15 LAB — INR PPP: 2.63 (ref 0.86–1.14)

## 2019-04-15 PROCEDURE — 36415 COLL VENOUS BLD VENIPUNCTURE: CPT | Performed by: INTERNAL MEDICINE

## 2019-04-15 PROCEDURE — 85610 PROTHROMBIN TIME: CPT | Performed by: INTERNAL MEDICINE

## 2019-04-15 PROCEDURE — 99215 OFFICE O/P EST HI 40 MIN: CPT | Performed by: INTERNAL MEDICINE

## 2019-04-15 PROCEDURE — 93000 ELECTROCARDIOGRAM COMPLETE: CPT | Performed by: INTERNAL MEDICINE

## 2019-04-15 ASSESSMENT — MIFFLIN-ST. JEOR: SCORE: 1686.08

## 2019-04-15 NOTE — PROGRESS NOTES
"Service Date: 04/15/2019      HISTORY OF PRESENT ILLNESS:    It was a pleasure seeing Mr. Ralph Farr, a 78-year-old male with the following cardiac/medical issues:   1.  Valvular heart disease.  AVR in 2006 with subsequent perivalvular leak and redo mechanical AVR and concomitant mechanical MVR in 2013.   2.  Coronary artery disease with CABG (LIMA to LAD and radial graft to RCA) in 2006.   3.  Chronic diastolic heart failure.  LVEF is 55%-60%.   4.  Previous endovascular AAA repair.   5.  AV conduction disease with bifascicular block and prolonged OR.    6.  Obstructive sleep apnea with use of CPAP.   7.  Hypertension.   8.  Dyslipidemia.   9.  Chronic back pain.   10.  Mild internal carotid artery disease.  Severe stenosis of left external carotid artery.   11.  Typical atrial flutter, new diagnosis.   12.  Varicose veins with venous insufficiency.  Treatment with VenaSeal closure, sclerotherapy and phlebectomy by Dr. Whitman in 01/2019.      Mr. Farr returns to the EP Clinic 6 months after his initial appointment in the fall of 2018.  At that time he was referred for dyspnea on exertion and concern about chronotropic incompetence.  There is evidence of AV conduction disease on his ECG and he had a chronotropically inadequate response to exertion.  I was not particularly impressed by his arrhythmias at the time, but requested a reassessment this spring.      In 02/2019, while visiting [place], [place], he had an event where he suddenly became very weak, his \"legs went limp\" and ended up on the ground.  He could hear everything that happened and does not think he passed out.  Shortly thereafter, he developed severe nausea and vomiting and his speech was slurred.  His friends and family feared he was having a stroke, so they took him to an emergency room in [place].  I do not have records.  I was told that he had a detailed evaluation that failed to disclose a new stroke.  A brain MRI only showed an \"old stroke\".   He " "was told that his heart was out of rhythm, in \"atrial fib or flutter.\"  I don't believe that a specific diagnosis was made regarding the cause of his collapse.      After returning to the [facility], the patient wore a 10-day ZioPatch monitor between 03/25 and 04/04.  I reviewed this today.  It showed 10 runs of VT, longest of 19 beats.  In addition, the patient was in persistent atrial flutter with an average heart rate of 85.  This is a new diagnosis.      Mr. Farr has felt \"off\" lately, in a \"mental fog.\"  His exercise capacity has decreased.  He has not seen a benefit in leg discomfort after the venous procedure last January.  He does not have exertional chest pain.      PHYSICAL EXAMINATION:   VITAL SIGNS:  Blood pressure 144/78, pulse 79 and irregular, weight 103 kg, height 165 cm.   HEENT:  Normocephalic.   NECK:  Supple with no apparent carotid bruits.   LUNGS:  Clear.   CARDIOVASCULAR:  Normal JVP, irregular rhythm with crisp metallic S1, S2, no apparent gallop or murmur.   ABDOMEN:  Mildly obese, soft, nontender.   EXTREMITIES:  Trace edema.  There are mild venous varicosities bilaterally.   NEUROLOGIC:  Alert and oriented x3.      DIAGNOSTIC STUDIES:    His 12-lead ECG today shows typical appearing atrial flutter with sawtooth pattern in inferior leads and positive flutter waves in V1.  There is a right bundle branch block with left anterior fascicular block.      IMPRESSION:   1.  Recent onset of persistent typical atrial flutter.  Mr. Farr was likely in atrial flutter in February when he was evaluated in a [place] ER after collapsing.  The etiology for this event is unclear, but there was associated nausea and vomiting.  He did not have a new stroke.  He was already on anticoagulation with warfarin because of his 2 mechanical valves.  The ventricular rate control during atrial flutter is currently good (without drugs) and this is related to his underlying AV conduction disease.        His recent " "fatigue and feeling \"off\" may be related to loss of AV synchrony.  I spoke to the patient regarding cardioversion or catheter ablation for atrial flutter.  I discussed the pros and cons of both approaches and explained that the first one is typically a short-term fix and the second is a long-term fix.  I explained that there is approximately 1%-2% risk of serious complication related to atrial flutter ablation.  After a good discussion, the patient has agreed to proceed with catheter ablation.     2.  Non-sustained VT.  He had several episodes on his recent cardiac monitor.  I have ordered a Lexiscan nuclear stress.  He does not have clear chest pain but his exercise capacity is limited because of back pain and pseudoclaudication.     3.  AV conduction disease.  It was explained that he may need a permanent pacemaker in the future since this electrical disorder is usually progressive.     4.  Status post mechanical AVR and MVR.  His INR has been consistently therapeutic.      RECOMMENDATIONS:   1.  Schedule atrial flutter ablation next week.  Check INR today and also 1 day before the procedure.   2.  Lexiscan nuclear stress test to assess for ischemia.   3.  Continue current medications.      It was my pleasure seeing Mr. Vaughn.  Please feel free to contact me with questions or concerns.   Time spent was 45 minutes, greater than 50% of the time was in discussion.        SHALA RIOS MD, [facility]         cc:      Tu Reyes MD    [address] [address]             D: 04/15/2019   T: 04/15/2019   MT: RUSTY      Name:     LIANG VAUGHN   MRN:      [id number]        Account:      [id number]   :      1940           Service Date: 04/15/2019      Document: [id number]      "

## 2019-04-15 NOTE — PROGRESS NOTES
HPI and Plan:   See dictation    Orders Placed This Encounter   Procedures     NM Lexiscan stress test (nuc card)     INR     EKG 12-lead complete w/read - Clinics (performed today)       No orders of the defined types were placed in this encounter.      There are no discontinued medications.      Encounter Diagnoses   Name Primary?     Typical atrial flutter (H) Yes     NSVT (nonsustained ventricular tachycardia) (H)      Abnormal electrocardiogram        CURRENT MEDICATIONS:  Current Outpatient Medications   Medication Sig Dispense Refill     aspirin 81 MG EC tablet Take 1 tablet (81 mg) by mouth daily 1 tablet 0     betamethasone valerate (VALISONE) 0.1 % lotion APPLY TOPICALLY TWICE DAILY PRN 60 mL 3     diazepam (VALIUM) 5 MG tablet Take 1 tablet (5 mg) by mouth every 6 hours as needed for anxiety 2 tablet 0     ezetimibe (ZETIA) 10 MG tablet Take 1 tablet (10 mg) by mouth daily 90 tablet 0     fluocinonide (LIDEX) 0.05 % ointment 2- 30 gram tubes.  Apply twice a day as needed. 60 g 2     furosemide (LASIX) 40 MG tablet Take 0.5 tablets (20 mg) by mouth daily 45 tablet 3     HERBALS White willow bark, Turmeric, Ginger, botswellia & Yucca mixed powder(1.5 tsp mixed in cranberry juice or orange juice) every day 60 each 11     mesalamine (ASACOL HD) 800 MG EC tablet Take 1 tablet (800 mg) by mouth 2 times daily 180 tablet 0     metoprolol tartrate (LOPRESSOR) 25 MG tablet Take 1 tablet (25 mg) by mouth 2 times daily 180 tablet 3     Potassium Chloride ER 20 MEQ TBCR Take 1 tablet (20 mEq) by mouth daily 30 tablet 1     rosuvastatin (CRESTOR) 40 MG tablet Take 1 tablet (40 mg) by mouth daily 90 tablet 3     tamsulosin (FLOMAX) 0.4 MG capsule Take 1 capsule (0.4 mg) by mouth daily 90 capsule 3     warfarin (COUMADIN) 5 MG tablet Take 1 1/2 tablets (7.5 mg) by mouth TWTFSS; 1 tablet (5 mg) on Mondays OR as directed by INR Clinic (Patient taking differently: 5 mg Take 1 1/2 tablets (7.5 mg) by mouth TWTSS; 1 tablet (5  mg) on Mondays and Friday OR as directed by INR Clinic) 135 tablet 3     COMPRESSION STOCKINGS 1 each daily (Patient not taking: Reported on 4/15/2019) 1 each 1       ALLERGIES     Allergies   Allergen Reactions     Bees Anaphylaxis       PAST MEDICAL HISTORY:  Past Medical History:   Diagnosis Date     Abdominal pain      Abnormal ECG     RBBB, 1st degree AVB, Left axis deviation     Anemia     currently taking iron     Arrhythmia     pac, pvc     Back pain     since 1980     BPH (benign prostatic hyperplasia)      Bruit      CAD (coronary artery disease)      Cellulitis 10/18/12     Cellulitis 05/2018    GrpB strep LLE cellulitis  negative RACHAEL for veg     Chronic venous insufficiency     bilat lower extremities     Contact dermatitis and other eczema, due to unspecified cause      Diaphragmatic hernia without mention of obstruction or gangrene      Diastolic HF (heart failure) (H)      Gastric ulcer      Glucose intolerance (impaired glucose tolerance)      Heart murmur 9/16/13    valvular heart disease     Hyperlipidaemia      Hypertension 8/6/13     Lumbago      Malaise and fatigue      Metabolic syndrome      Mobitz (type) I (Wenckebach's) atrioventricular block     and RBBB     Nocturia 10/18/12     Nocturia      Nonallopathic lesion of cervical region      Nonallopathic lesion of lumbar region      Nonallopathic lesion of pelvic region, not elsewhere classified      Nonallopathic lesion of rib cage      Nonallopathic lesion of sacral region      GAGE (obstructive sleep apnea)     pt declined cpap     Paroxysmal atrial fibrillation (H) 10/18/12     Prostate cancer (H) 2008    radiation seed, XRT      PVD (peripheral vascular disease) (H)      RBBB      Rotator cuff strain     and sprain     S/P AAA repair      S/P aortic valve replacement 2006    developed perivalve leak and MS, therefore redo surg 2013     S/P CABG (coronary artery bypass graft) 2006    Lima-Lad, RA-Rca     Sciatica of left side     since 2000      Sepsis due to group B Streptococcus (H) 5/19/2018     Ulcerative colitis (H)      Varicose veins of bilateral lower extremities with other complications     s/p RLE vein stripping     Vitamin D deficiency        PAST SURGICAL HISTORY:  Past Surgical History:   Procedure Laterality Date     AORTIC VALVE REPLACEMENT  1/3/06    redo AVR SJM 21mm and SJM MVR 27mm in 2013SJM 21(AGFN [id number]):AVR, SJM 27 :MVR-     ARTHROPLASTY KNEE      right knee     BACK SURGERY  Oct 2015    Fusion L4-5, laminectomy L2, L3     BYPASS GRAFT ARTERY CORONARY  10/2013    reimplantation of radial artery graft to RCA     C CABG, VEIN, TWO  1/3/06    Left radial to RCA, LIMA to LAD (RA to RCA reimplanted at time of 2013 surg)     CARDIAC CATHERIZATION  11/2005    Stent placed to RCA     CARDIAC CATHERIZATION  04/2013    Occluded RCA, patent LIMA to LAD and radial graft to PDA     CARPAL TUNNEL RELEASE RT/LT  1994     COLONOSCOPY  8-22-11     CYSTOSCOPY FLEXIBLE  10/16/2013    Procedure: CYSTOSCOPY FLEXIBLE;  FLEXIBLE CYSTOSCOPY / DILATION OF URETHRA / INSERTION OF LESLIE;  Surgeon: Cooper Wallace MD;  Location: [facility] OR     ENDOVASCULAR REPAIR ANEURYSM ABDOMINAL AORTA  2006     ENDOVASCULAR REPAIR, INFRARENAL ABDOMINAL AORTIC ANEURYSM/DISSECTION; MODULAR BIFURCATED PROSTHESIS      AAA repair endovascular     ENT SURGERY       GENITOURINARY SURGERY  6/16/08    radioactive seeding     HEAD & NECK SURGERY  1997    vocal cord polypectomy     KNEE SURGERY  2001 Right knee arthroscopy     OPTICAL TRACKING SYSTEM FUSION SPINE POSTERIOR LUMBAR THREE+ LEVELS N/A 10/29/2015    Procedure: OPTICAL TRACKING SYSTEM FUSION SPINE POSTERIOR LUMBAR THREE+ LEVELS;  Surgeon: Walt Garcia MD;  Location:  OR     PROSTATE SURGERY  06/16/2008 Radioactive seeding     PROSTATE SURGERY      radioactive seeding 6/16/08     REPAIR ANEURYSM ABDOMINAL AORTA  06/08     REPAIR VALVE MITRAL  10/16/2013    [facility] 21(AGFN [id number]):AVR, SJM 27 :MVRProcedure: REPAIR VALVE  MITRAL;  REDO STERNOTOMY/REDO AORTIC VALVE REPLACEMENT/ MITRAL VALVE REPLACEMENT/REIMPLANTATION OF RIGHT CORONARY ARTERY BYPASS WITH RACHAEL ( ON PUMP);  Surgeon: Viet Singh MD;  Location:  OR     REPLACE VALVE AORTIC  10/16/2013    Procedure: REPLACE VALVE AORTIC;;  Surgeon: Viet Singh MD;  Location:  OR     SURGERY GENERAL IP CONSULT  2008 Excision aneurysm abdominal aorta     SURGERY GENERAL IP CONSULT   Vocal cord polypectomy     VASCULAR SURGERY  1993     varicose vein stripping       FAMILY HISTORY:  Family History   Problem Relation Age of Onset     Coronary Artery Disease Father         CABG     Heart Disease Father         Pacemaker     Other Cancer Daughter      Heart Disease Brother      Other - See Comments Grandchild        SOCIAL HISTORY:  Social History     Socioeconomic History     Marital status:      Spouse name: None     Number of children: None     Years of education: None     Highest education level: None   Occupational History     None   Social Needs     Financial resource strain: None     Food insecurity:     Worry: None     Inability: None     Transportation needs:     Medical: None     Non-medical: None   Tobacco Use     Smoking status: Former Smoker     Packs/day: 1.00     Years: 40.00     Pack years: 40.00     Start date: 4/15/1962     Last attempt to quit: 10/23/2002     Years since quittin.4     Smokeless tobacco: Never Used   Substance and Sexual Activity     Alcohol use: Yes     Comment: a couple beers per week (socially)     Drug use: No     Sexual activity: Never   Lifestyle     Physical activity:     Days per week: None     Minutes per session: None     Stress: None   Relationships     Social connections:     Talks on phone: None     Gets together: None     Attends Religion service: None     Active member of club or organization: None     Attends meetings of clubs or organizations: None     Relationship status: None     Intimate  partner violence:     Fear of current or ex partner: None     Emotionally abused: None     Physically abused: None     Forced sexual activity: None   Other Topics Concern     Parent/sibling w/ CABG, MI or angioplasty before 65F 55M? Yes     Comment: Brother had bypass at 55      Service Not Asked     Blood Transfusions Not Asked     Caffeine Concern No     Comment: 6-8 cups of half and half per day     Occupational Exposure Not Asked     Hobby Hazards Not Asked     Sleep Concern Not Asked     Stress Concern Not Asked     Weight Concern Not Asked     Special Diet No     Back Care Not Asked     Exercise No     Bike Helmet Not Asked     Seat Belt Not Asked     Self-Exams Not Asked   Social History Narrative     None       Review of Systems:  Skin:  Negative rash healing process takes longer   Eyes:  Positive for glasses    ENT:  Positive for hearing loss hearing aids  Respiratory:  Positive for dyspnea on exertion;sleep apnea;CPAP stairs   Cardiovascular:  Negative;palpitations;chest pain;syncope or near-syncope;cyanosis;fatigue;lightheadedness;dizziness;exercise intolerance Positive for;palpitations;syncope or near-syncope;lightheadedness;dizziness;fatigue when getting up quickly; edema of the left leg  Gastroenterology: Negative reflux colitis  Genitourinary:  Negative for nocturia    Musculoskeletal:  Positive for back pain;arthritis right leg pain  Neurologic:  Negative headaches minor   Psychiatric:  Negative excessive stress daughter and granddaughters health   Heme/Lymph/Imm:  Positive for allergies    Endocrine:  Negative        [id number]

## 2019-04-15 NOTE — LETTER
4/15/2019    Tu Reyes MD  [address] [place] [address]    RE: Fausto Farr       Dear Colleague,    I had the pleasure of seeing Fausto Farr in the [facility].    HPI and Plan:   See dictation    Orders Placed This Encounter   Procedures     NM Lexiscan stress test (nuc card)     INR     EKG 12-lead complete w/read - Clinics (performed today)       No orders of the defined types were placed in this encounter.      There are no discontinued medications.      Encounter Diagnoses   Name Primary?     Typical atrial flutter (H) Yes     NSVT (nonsustained ventricular tachycardia) (H)      Abnormal electrocardiogram        CURRENT MEDICATIONS:  Current Outpatient Medications   Medication Sig Dispense Refill     aspirin 81 MG EC tablet Take 1 tablet (81 mg) by mouth daily 1 tablet 0     betamethasone valerate (VALISONE) 0.1 % lotion APPLY TOPICALLY TWICE DAILY PRN 60 mL 3     diazepam (VALIUM) 5 MG tablet Take 1 tablet (5 mg) by mouth every 6 hours as needed for anxiety 2 tablet 0     ezetimibe (ZETIA) 10 MG tablet Take 1 tablet (10 mg) by mouth daily 90 tablet 0     fluocinonide (LIDEX) 0.05 % ointment 2- 30 gram tubes.  Apply twice a day as needed. 60 g 2     furosemide (LASIX) 40 MG tablet Take 0.5 tablets (20 mg) by mouth daily 45 tablet 3     HERBALS White willow bark, Turmeric, Ginger, botswellia & Yucca mixed powder(1.5 tsp mixed in cranberry juice or orange juice) every day 60 each 11     mesalamine (ASACOL HD) 800 MG EC tablet Take 1 tablet (800 mg) by mouth 2 times daily 180 tablet 0     metoprolol tartrate (LOPRESSOR) 25 MG tablet Take 1 tablet (25 mg) by mouth 2 times daily 180 tablet 3     Potassium Chloride ER 20 MEQ TBCR Take 1 tablet (20 mEq) by mouth daily 30 tablet 1     rosuvastatin (CRESTOR) 40 MG tablet Take 1 tablet (40 mg) by mouth daily 90 tablet 3     tamsulosin (FLOMAX) 0.4 MG capsule Take 1 capsule (0.4 mg) by mouth daily 90  capsule 3     warfarin (COUMADIN) 5 MG tablet Take 1 1/2 tablets (7.5 mg) by mouth TWTFSS; 1 tablet (5 mg) on Mondays OR as directed by INR Clinic (Patient taking differently: 5 mg Take 1 1/2 tablets (7.5 mg) by mouth TWTSS; 1 tablet (5 mg) on Mondays and Friday OR as directed by INR Clinic) 135 tablet 3     COMPRESSION STOCKINGS 1 each daily (Patient not taking: Reported on 4/15/2019) 1 each 1       ALLERGIES     Allergies   Allergen Reactions     Bees Anaphylaxis       PAST MEDICAL HISTORY:  Past Medical History:   Diagnosis Date     Abdominal pain      Abnormal ECG     RBBB, 1st degree AVB, Left axis deviation     Anemia     currently taking iron     Arrhythmia     pac, pvc     Back pain     since 1980     BPH (benign prostatic hyperplasia)      Bruit      CAD (coronary artery disease)      Cellulitis 10/18/12     Cellulitis 05/2018    GrpB strep LLE cellulitis  negative RACHAEL for veg     Chronic venous insufficiency     bilat lower extremities     Contact dermatitis and other eczema, due to unspecified cause      Diaphragmatic hernia without mention of obstruction or gangrene      Diastolic HF (heart failure) (H)      Gastric ulcer      Glucose intolerance (impaired glucose tolerance)      Heart murmur 9/16/13    valvular heart disease     Hyperlipidaemia      Hypertension 8/6/13     Lumbago      Malaise and fatigue      Metabolic syndrome      Mobitz (type) I (Wenckebach's) atrioventricular block     and RBBB     Nocturia 10/18/12     Nocturia      Nonallopathic lesion of cervical region      Nonallopathic lesion of lumbar region      Nonallopathic lesion of pelvic region, not elsewhere classified      Nonallopathic lesion of rib cage      Nonallopathic lesion of sacral region      GAGE (obstructive sleep apnea)     pt declined cpap     Paroxysmal atrial fibrillation (H) 10/18/12     Prostate cancer (H) 2008    radiation seed, XRT      PVD (peripheral vascular disease) (H)      RBBB      Rotator cuff strain      and sprain     S/P AAA repair      S/P aortic valve replacement 2006    developed perivalve leak and MS, therefore redo surg 2013     S/P CABG (coronary artery bypass graft) 2006    Lima-Lad, RA-Rca     Sciatica of left side     since 2000     Sepsis due to group B Streptococcus (H) 5/19/2018     Ulcerative colitis (H)      Varicose veins of bilateral lower extremities with other complications     s/p RLE vein stripping     Vitamin D deficiency        PAST SURGICAL HISTORY:  Past Surgical History:   Procedure Laterality Date     AORTIC VALVE REPLACEMENT  1/3/06    redo AVR SJM 21mm and SJM MVR 27mm in 2013SJM 21([id number]):AVR, SJM 27 :MVR-     ARTHROPLASTY KNEE      right knee     BACK SURGERY  Oct 2015    Fusion L4-5, laminectomy L2, L3     BYPASS GRAFT ARTERY CORONARY  10/2013    reimplantation of radial artery graft to RCA     C CABG, VEIN, TWO  1/3/06    Left radial to RCA, LIMA to LAD (RA to RCA reimplanted at time of 2013 surg)     CARDIAC CATHERIZATION  11/2005    Stent placed to RCA     CARDIAC CATHERIZATION  04/2013    Occluded RCA, patent LIMA to LAD and radial graft to PDA     CARPAL TUNNEL RELEASE RT/LT  1994     COLONOSCOPY  8-22-11     CYSTOSCOPY FLEXIBLE  10/16/2013    Procedure: CYSTOSCOPY FLEXIBLE;  FLEXIBLE CYSTOSCOPY / DILATION OF URETHRA / INSERTION OF LESLIE;  Surgeon: Cooper Wallace MD;  Location:  OR     ENDOVASCULAR REPAIR ANEURYSM ABDOMINAL AORTA  2006     ENDOVASCULAR REPAIR, INFRARENAL ABDOMINAL AORTIC ANEURYSM/DISSECTION; MODULAR BIFURCATED PROSTHESIS      AAA repair endovascular     ENT SURGERY       GENITOURINARY SURGERY  6/16/08    radioactive seeding     HEAD & NECK SURGERY  1997    vocal cord polypectomy     KNEE SURGERY  2001 Right knee arthroscopy     OPTICAL TRACKING SYSTEM FUSION SPINE POSTERIOR LUMBAR THREE+ LEVELS N/A 10/29/2015    Procedure: OPTICAL TRACKING SYSTEM FUSION SPINE POSTERIOR LUMBAR THREE+ LEVELS;  Surgeon: Walt Garcia MD;  Location:  OR      PROSTATE SURGERY  2008 Radioactive seeding     PROSTATE SURGERY      radioactive seeding 08     REPAIR ANEURYSM ABDOMINAL AORTA       REPAIR VALVE MITRAL  10/16/2013    SJM 21(AGFN [id number]):AVR, SJM 27  501:MVRProcedure: REPAIR VALVE MITRAL;  REDO STERNOTOMY/REDO AORTIC VALVE REPLACEMENT/ MITRAL VALVE REPLACEMENT/REIMPLANTATION OF RIGHT CORONARY ARTERY BYPASS WITH RACHAEL ( ON PUMP);  Surgeon: Viet Singh MD;  Location:  OR     REPLACE VALVE AORTIC  10/16/2013    Procedure: REPLACE VALVE AORTIC;;  Surgeon: Viet Singh MD;  Location:  OR     SURGERY GENERAL IP CONSULT  2008 Excision aneurysm abdominal aorta     SURGERY GENERAL IP CONSULT   Vocal cord polypectomy     VASCULAR SURGERY  1993     varicose vein stripping       FAMILY HISTORY:  Family History   Problem Relation Age of Onset     Coronary Artery Disease Father         CABG     Heart Disease Father         Pacemaker     Other Cancer Daughter      Heart Disease Brother      Other - See Comments Grandchild        SOCIAL HISTORY:  Social History     Socioeconomic History     Marital status:      Spouse name: None     Number of children: None     Years of education: None     Highest education level: None   Occupational History     None   Social Needs     Financial resource strain: None     Food insecurity:     Worry: None     Inability: None     Transportation needs:     Medical: None     Non-medical: None   Tobacco Use     Smoking status: Former Smoker     Packs/day: 1.00     Years: 40.00     Pack years: 40.00     Start date: 4/15/1962     Last attempt to quit: 10/23/2002     Years since quittin.4     Smokeless tobacco: Never Used   Substance and Sexual Activity     Alcohol use: Yes     Comment: a couple beers per week (socially)     Drug use: No     Sexual activity: Never   Lifestyle     Physical activity:     Days per week: None     Minutes per session: None     Stress: None   Relationships      Social connections:     Talks on phone: None     Gets together: None     Attends Orthodoxy service: None     Active member of club or organization: None     Attends meetings of clubs or organizations: None     Relationship status: None     Intimate partner violence:     Fear of current or ex partner: None     Emotionally abused: None     Physically abused: None     Forced sexual activity: None   Other Topics Concern     Parent/sibling w/ CABG, MI or angioplasty before 65F 55M? Yes     Comment: Brother had bypass at 55      Service Not Asked     Blood Transfusions Not Asked     Caffeine Concern No     Comment: 6-8 cups of half and half per day     Occupational Exposure Not Asked     Hobby Hazards Not Asked     Sleep Concern Not Asked     Stress Concern Not Asked     Weight Concern Not Asked     Special Diet No     Back Care Not Asked     Exercise No     Bike Helmet Not Asked     Seat Belt Not Asked     Self-Exams Not Asked   Social History Narrative     None       Review of Systems:  Skin:  Negative rash healing process takes longer   Eyes:  Positive for glasses    ENT:  Positive for hearing loss hearing aids  Respiratory:  Positive for dyspnea on exertion;sleep apnea;CPAP stairs   Cardiovascular:  Negative;palpitations;chest pain;syncope or near-syncope;cyanosis;fatigue;lightheadedness;dizziness;exercise intolerance Positive for;palpitations;syncope or near-syncope;lightheadedness;dizziness;fatigue when getting up quickly; edema of the left leg  Gastroenterology: Negative reflux colitis  Genitourinary:  Negative for nocturia    Musculoskeletal:  Positive for back pain;arthritis right leg pain  Neurologic:  Negative headaches minor   Psychiatric:  Negative excessive stress daughter and granddaughters health   Heme/Lymph/Imm:  Positive for allergies    Endocrine:  Negative        [id number]                Thank you for allowing me to participate in the care of your patient.      Sincerely,     Jessy Vasquez  MD     [facility]    cc:   No referring provider defined for this encounter.

## 2019-04-15 NOTE — LETTER
"4/15/2019      Tu Reyes MD  [address] [place] [address]      RE: Fausto Farr       Dear Colleague,    I had the pleasure of seeing Fausto Farr in the [facility].    Service Date: 04/15/2019      HISTORY OF PRESENT ILLNESS:    It was a pleasure seeing Mr. Ralph Farr, a 78-year-old male with the following cardiac/medical issues:   1.  Valvular heart disease.  AVR in 2006 with subsequent perivalvular leak and redo mechanical AVR and concomitant mechanical MVR in 2013.   2.  Coronary artery disease with CABG (LIMA to LAD and radial graft to RCA) in 2006.   3.  Chronic diastolic heart failure.  LVEF is 55%-60%.   4.  Previous endovascular AAA repair.   5.  AV conduction disease with bifascicular block and prolonged MT.    6.  Obstructive sleep apnea with use of CPAP.   7.  Hypertension.   8.  Dyslipidemia.   9.  Chronic back pain.   10.  Mild internal carotid artery disease.  Severe stenosis of left external carotid artery.   11.  Typical atrial flutter, new diagnosis.   12.  Varicose veins with venous insufficiency.  Treatment with VenaSeal closure, sclerotherapy and phlebectomy by Dr. [place] in 01/2019.      Mr. Farr returns to the EP Clinic 6 months after his initial appointment in the fall of 2018.  At that time he was referred for dyspnea on exertion and concern about chronotropic incompetence.  There is evidence of AV conduction disease on his ECG and he had a chronotropically inadequate response to exertion.  I was not particularly impressed by his arrhythmias at the time, but requested a reassessment this spring.      In 02/2019, while visiting [place], [place], he had an event where he suddenly became very weak, his \"legs went limp\" and ended up on the ground.  He could hear everything that happened and does not think he passed out.  Shortly thereafter, he developed severe nausea and vomiting and his speech was slurred.  His friends and family " "feared he was having a stroke, so they took him to an emergency room in [place].  I do not have records.  I was told that he had a detailed evaluation that failed to disclose a new stroke.  A brain MRI only showed an \"old stroke\".   He was told that his heart was out of rhythm, in \"atrial fib or flutter.\"  I don't believe that a specific diagnosis was made regarding the cause of his collapse.      After returning to the [facility], the patient wore a 10-day ZioPatch monitor between 03/25 and 04/04.  I reviewed this today.  It showed 10 runs of VT, longest of 19 beats.  In addition, the patient was in persistent atrial flutter with an average heart rate of 85.  This is a new diagnosis.      Mr. Farr has felt \"off\" lately, in a \"mental fog.\"  His exercise capacity has decreased.  He has not seen a benefit in leg discomfort after the venous procedure last January.  He does not have exertional chest pain.      PHYSICAL EXAMINATION:   VITAL SIGNS:  Blood pressure 144/78, pulse 79 and irregular, weight 103 kg, height 165 cm.   HEENT:  Normocephalic.   NECK:  Supple with no apparent carotid bruits.   LUNGS:  Clear.   CARDIOVASCULAR:  Normal JVP, irregular rhythm with crisp metallic S1, S2, no apparent gallop or murmur.   ABDOMEN:  Mildly obese, soft, nontender.   EXTREMITIES:  Trace edema.  There are mild venous varicosities bilaterally.   NEUROLOGIC:  Alert and oriented x3.      DIAGNOSTIC STUDIES:    His 12-lead ECG today shows typical appearing atrial flutter with sawtooth pattern in inferior leads and positive flutter waves in V1.  There is a right bundle branch block with left anterior fascicular block.      IMPRESSION:   1.  Recent onset of persistent typical atrial flutter.  Mr. Farr was likely in atrial flutter in February when he was evaluated in a [place] ER after collapsing.  The etiology for this event is unclear, but there was associated nausea and vomiting.  He did not have a new stroke.  He was already on " "anticoagulation with warfarin because of his 2 mechanical valves.  The ventricular rate control during atrial flutter is currently good (without drugs) and this is related to his underlying AV conduction disease.        His recent fatigue and feeling \"off\" may be related to loss of AV synchrony.  I spoke to the patient regarding cardioversion or catheter ablation for atrial flutter.  I discussed the pros and cons of both approaches and explained that the first one is typically a short-term fix and the second is a long-term fix.  I explained that there is approximately 1%-2% risk of serious complication related to atrial flutter ablation.  After a good discussion, the patient has agreed to proceed with catheter ablation.     2.  Non-sustained VT.  He had several episodes on his recent cardiac monitor.  I have ordered a Lexiscan nuclear stress.  He does not have clear chest pain but his exercise capacity is limited because of back pain and pseudoclaudication.     3.  AV conduction disease.  It was explained that he may need a permanent pacemaker in the future since this electrical disorder is usually progressive.     4.  Status post mechanical AVR and MVR.  His INR has been consistently therapeutic.      RECOMMENDATIONS:   1.  Schedule atrial flutter ablation next week.  Check INR today and also 1 day before the procedure.   2.  Lexiscan nuclear stress test to assess for ischemia.   3.  Continue current medications.      It was my pleasure seeing Mr. Vaughn.  Please feel free to contact me with questions or concerns.   Time spent was 45 minutes, greater than 50% of the time was in discussion.        SHALA RIOS MD, [facility]         cc:      [place] [address]             D: 04/15/2019   T: 04/15/2019   MT: RUSTY      Name:     LIANG VAUGHN   MRN:      [id number]        Account:      [id number]   :      1940           Service Date: " 04/15/2019      Document: [id number]           Outpatient Encounter Medications as of 4/15/2019   Medication Sig Dispense Refill     aspirin 81 MG EC tablet Take 1 tablet (81 mg) by mouth daily 1 tablet 0     betamethasone valerate (VALISONE) 0.1 % lotion APPLY TOPICALLY TWICE DAILY PRN 60 mL 3     diazepam (VALIUM) 5 MG tablet Take 1 tablet (5 mg) by mouth every 6 hours as needed for anxiety 2 tablet 0     ezetimibe (ZETIA) 10 MG tablet Take 1 tablet (10 mg) by mouth daily 90 tablet 0     fluocinonide (LIDEX) 0.05 % ointment 2- 30 gram tubes.  Apply twice a day as needed. 60 g 2     furosemide (LASIX) 40 MG tablet Take 0.5 tablets (20 mg) by mouth daily 45 tablet 3     HERBALS White willow bark, Turmeric, Ginger, botswellia & Yucca mixed powder(1.5 tsp mixed in cranberry juice or orange juice) every day 60 each 11     mesalamine (ASACOL HD) 800 MG EC tablet Take 1 tablet (800 mg) by mouth 2 times daily 180 tablet 0     metoprolol tartrate (LOPRESSOR) 25 MG tablet Take 1 tablet (25 mg) by mouth 2 times daily 180 tablet 3     Potassium Chloride ER 20 MEQ TBCR Take 1 tablet (20 mEq) by mouth daily 30 tablet 1     rosuvastatin (CRESTOR) 40 MG tablet Take 1 tablet (40 mg) by mouth daily 90 tablet 3     tamsulosin (FLOMAX) 0.4 MG capsule Take 1 capsule (0.4 mg) by mouth daily 90 capsule 3     warfarin (COUMADIN) 5 MG tablet Take 1 1/2 tablets (7.5 mg) by mouth TWTFSS; 1 tablet (5 mg) on Mondays OR as directed by INR Clinic (Patient taking differently: 5 mg Take 1 1/2 tablets (7.5 mg) by mouth TWTSS; 1 tablet (5 mg) on Mondays and Friday OR as directed by INR Clinic) 135 tablet 3     COMPRESSION STOCKINGS 1 each daily (Patient not taking: Reported on 4/15/2019) 1 each 1     No facility-administered encounter medications on file as of 4/15/2019.        Again, thank you for allowing me to participate in the care of your patient.      Sincerely,    Jessy Vasquez MD     [facility]

## 2019-04-18 ENCOUNTER — HOSPITAL ENCOUNTER (OUTPATIENT)
Dept: CARDIOLOGY | Facility: CLINIC | Age: 79
Discharge: HOME | End: 2019-04-18
Attending: INTERNAL MEDICINE | Admitting: INTERNAL MEDICINE
Payer: MEDICARE

## 2019-04-18 ENCOUNTER — HOSPITAL ENCOUNTER (OUTPATIENT)
Dept: NUCLEAR MEDICINE | Facility: CLINIC | Age: 79
Setting detail: NUCLEAR MEDICINE
End: 2019-04-18
Attending: INTERNAL MEDICINE
Payer: MEDICARE

## 2019-04-18 DIAGNOSIS — R94.31 ABNORMAL ELECTROCARDIOGRAM: ICD-10-CM

## 2019-04-18 PROCEDURE — 25000128 H RX IP 250 OP 636

## 2019-04-18 PROCEDURE — 93017 CV STRESS TEST TRACING ONLY: CPT

## 2019-04-18 PROCEDURE — 78452 HT MUSCLE IMAGE SPECT MULT: CPT | Mod: 26 | Performed by: INTERNAL MEDICINE

## 2019-04-18 PROCEDURE — 78452 HT MUSCLE IMAGE SPECT MULT: CPT

## 2019-04-18 PROCEDURE — 93016 CV STRESS TEST SUPVJ ONLY: CPT | Performed by: INTERNAL MEDICINE

## 2019-04-18 PROCEDURE — 93018 CV STRESS TEST I&R ONLY: CPT | Performed by: INTERNAL MEDICINE

## 2019-04-18 PROCEDURE — A9502 TC99M TETROFOSMIN: HCPCS | Performed by: INTERNAL MEDICINE

## 2019-04-18 PROCEDURE — 34300033 ZZH RX 343: Performed by: INTERNAL MEDICINE

## 2019-04-18 RX ORDER — ACYCLOVIR 200 MG/1
0-1 CAPSULE ORAL
Status: DISCONTINUED | OUTPATIENT
Start: 2019-04-18 | End: 2019-04-19 | Stop reason: HOSPADM

## 2019-04-18 RX ORDER — REGADENOSON 0.08 MG/ML
0.4 INJECTION, SOLUTION INTRAVENOUS ONCE
Status: COMPLETED | OUTPATIENT
Start: 2019-04-18 | End: 2019-04-18

## 2019-04-18 RX ORDER — AMINOPHYLLINE 25 MG/ML
50-100 INJECTION, SOLUTION INTRAVENOUS
Status: DISCONTINUED | OUTPATIENT
Start: 2019-04-18 | End: 2019-04-19 | Stop reason: HOSPADM

## 2019-04-18 RX ORDER — REGADENOSON 0.08 MG/ML
INJECTION, SOLUTION INTRAVENOUS
Status: COMPLETED
Start: 2019-04-18 | End: 2019-04-18

## 2019-04-18 RX ORDER — ALBUTEROL SULFATE 90 UG/1
2 AEROSOL, METERED RESPIRATORY (INHALATION) EVERY 5 MIN PRN
Status: DISCONTINUED | OUTPATIENT
Start: 2019-04-18 | End: 2019-04-19 | Stop reason: HOSPADM

## 2019-04-18 RX ADMIN — REGADENOSON 0.4 MG: 0.08 INJECTION, SOLUTION INTRAVENOUS at 09:25

## 2019-04-18 RX ADMIN — TETROFOSMIN 10 MCI.: 1.38 INJECTION, POWDER, LYOPHILIZED, FOR SOLUTION INTRAVENOUS at 07:51

## 2019-04-18 RX ADMIN — TETROFOSMIN 31.9 MCI.: 1.38 INJECTION, POWDER, LYOPHILIZED, FOR SOLUTION INTRAVENOUS at 09:26

## 2019-04-18 NOTE — PROGRESS NOTES
Pre-procedure:  Are you having any pain or shortness of breath (prior to starting)? denies  Initial vital signs: /85, HR 97, RR 17  Allergies reviewed: yes   Rhythm: A. Flutter  Medications taken within 48 hours of procedure: denies   Any nitrates within the last 48 hours:denies  Last Caffeine: yesterday  Lung sounds: CTA, no wheezing, crackles or rtx  Health History (COPD, Asthma, etc): denies           Procedure: Lexiscan  Reaction/symptoms after receiving Laura injection: Shortness of breath  Intensity of Pain: 3  Rhythm: a flutter  1. Vital Signs:/76, , RR 18  2. Vital Signs:/63, HR 97, RR 20     Reversal agent: N/A    Post:   Resolution of symptoms?: YES  Vital signs: /72, HR 96, RR 18  Vital signs: //71, HR 92, RR 16  Rhythm: a flutter  Walk: NO  Comment: pt informed about procedure and answered questions. Pt verbalizes understanding. Tolerated procedure well. Shortness of breath going away.   Return to Radiology

## 2019-04-19 ENCOUNTER — TELEPHONE (OUTPATIENT)
Dept: CARDIOLOGY | Facility: CLINIC | Age: 79
End: 2019-04-19

## 2019-04-19 ENCOUNTER — ANTICOAGULATION THERAPY VISIT (OUTPATIENT)
Dept: NURSING | Facility: CLINIC | Age: 79
End: 2019-04-19
Payer: MEDICARE

## 2019-04-19 DIAGNOSIS — Z79.01 LONG TERM CURRENT USE OF ANTICOAGULANT THERAPY: Primary | ICD-10-CM

## 2019-04-19 DIAGNOSIS — Z95.2 S/P MITRAL VALVE REPLACEMENT: ICD-10-CM

## 2019-04-19 DIAGNOSIS — I48.91 AF (ATRIAL FIBRILLATION) (H): ICD-10-CM

## 2019-04-19 LAB — INR POINT OF CARE: 3.4 (ref 0.86–1.14)

## 2019-04-19 PROCEDURE — 99207 ZZC NO CHARGE NURSE ONLY: CPT

## 2019-04-19 PROCEDURE — 36416 COLLJ CAPILLARY BLOOD SPEC: CPT

## 2019-04-19 PROCEDURE — 85610 PROTHROMBIN TIME: CPT | Mod: QW

## 2019-04-19 RX ORDER — LIDOCAINE 40 MG/G
CREAM TOPICAL
Status: CANCELLED | OUTPATIENT
Start: 2019-04-19

## 2019-04-19 RX ORDER — SODIUM CHLORIDE 450 MG/100ML
INJECTION, SOLUTION INTRAVENOUS CONTINUOUS
Status: CANCELLED | OUTPATIENT
Start: 2019-04-19

## 2019-04-19 NOTE — PROGRESS NOTES
ANTICOAGULATION FOLLOW-UP CLINIC VISIT    Patient Name:  Fausto Farr  Date:  4/19/2019  Contact Type:  Face to Face    SUBJECTIVE:     Patient Findings     Positives:   Upcoming invasive procedure    Comments:   He is having an EP Ablation for   Atrial Flutter on Monday 4/22/19.  He does not need to hold warfarin.    The patient was assessed for   diet, medication,   missed or extra doses,   bruising or bleeding,   with no problem findings.             OBJECTIVE    INR Protime   Date Value Ref Range Status   04/19/2019 3.4 (A) 0.86 - 1.14 Final       ASSESSMENT / PLAN  INR assessment THER    Recheck INR In: 5 WEEKS    INR Location Clinic      Anticoagulation Summary  As of 4/19/2019    INR goal:   2.5-3.5   TTR:   53.8 % (3 y)   INR used for dosing:   3.4 (4/19/2019)   Warfarin maintenance plan:   5 mg (5 mg x 1) every Mon, Fri; 7.5 mg (5 mg x 1.5) all other days   Full warfarin instructions:   5 mg every Mon, Fri; 7.5 mg all other days   Weekly warfarin total:   47.5 mg   No change documented:   Caryn Campos RN   Plan last modified:   Caryn Campos RN (1/3/2019)   Next INR check:   5/21/2019   Priority:   INR   Target end date:   Indefinite    Indications    AF (atrial fibrillation) (H) [I48.91]  S/P mitral valve replacement [Z95.2]  Long term current use of anticoagulant therapy [Z79.01]             Anticoagulation Episode Summary     INR check location:       Preferred lab:       Send INR reminders to:   [facility]    Comments:   5mg tabs - andrade dose // transfer from [facility] // [facility] // CALENDAR      Anticoagulation Care Providers     Provider Role Specialty Phone number    Tu Reyes MD  Internal Medicine [phone]            See the Encounter Report to view Anticoagulation Flowsheet and Dosing Calendar (Go to Encounters tab in chart review, and find the Anticoagulation Therapy Visit)    INR is therapeutic today. Patient will continue same dose. Follow up in 5  weeks or sooner if needed.        Caryn Campos RN

## 2019-04-19 NOTE — TELEPHONE ENCOUNTER
Pt scheduled for Aflutter Ablation 4/22. Todays INR is 3.4. Pls advise on Coumadin dose . Thx     04/19/19 Pt returned call . Aflutter Ablation  procedure 4/22/19. Notified of arrival time, NPO after midnight with sips of water for am meds. Discussed meds to be held and that patient will need a  for ride home.Per verbal order, Dr. Vasquez wants him to take 5 mg Coumadin tonight and 7.5 mg 4/19 and 4/20. Pt verbalized understanding.  . Kylie

## 2019-04-22 ENCOUNTER — HOSPITAL ENCOUNTER (OUTPATIENT)
Facility: CLINIC | Age: 79
Discharge: HOME | End: 2019-04-22
Admitting: INTERNAL MEDICINE
Payer: MEDICARE

## 2019-04-22 ENCOUNTER — SURGERY (OUTPATIENT)
Age: 79
End: 2019-04-22
Payer: MEDICARE

## 2019-04-22 VITALS
SYSTOLIC BLOOD PRESSURE: 95 MMHG | RESPIRATION RATE: 16 BRPM | HEIGHT: 66 IN | OXYGEN SATURATION: 95 % | BODY MASS INDEX: 36.37 KG/M2 | WEIGHT: 226.3 LBS | HEART RATE: 105 BPM | DIASTOLIC BLOOD PRESSURE: 76 MMHG | TEMPERATURE: 97.6 F

## 2019-04-22 DIAGNOSIS — I48.3 TYPICAL ATRIAL FLUTTER (H): ICD-10-CM

## 2019-04-22 LAB
ANION GAP SERPL CALCULATED.3IONS-SCNC: 6 MMOL/L (ref 3–14)
BUN SERPL-MCNC: 17 MG/DL (ref 7–30)
CALCIUM SERPL-MCNC: 9.4 MG/DL (ref 8.5–10.1)
CHLORIDE SERPL-SCNC: 107 MMOL/L (ref 94–109)
CO2 SERPL-SCNC: 26 MMOL/L (ref 20–32)
CREAT SERPL-MCNC: 0.94 MG/DL (ref 0.66–1.25)
ERYTHROCYTE [DISTWIDTH] IN BLOOD BY AUTOMATED COUNT: 12.9 % (ref 10–15)
GFR SERPL CREATININE-BSD FRML MDRD: 77 ML/MIN/{1.73_M2}
GLUCOSE SERPL-MCNC: 120 MG/DL (ref 70–99)
HCT VFR BLD AUTO: 40.9 % (ref 40–53)
HGB BLD-MCNC: 13.7 G/DL (ref 13.3–17.7)
INR BLD: 2.9 (ref 0.86–1.14)
MCH RBC QN AUTO: 29.9 PG (ref 26.5–33)
MCHC RBC AUTO-ENTMCNC: 33.5 G/DL (ref 31.5–36.5)
MCV RBC AUTO: 89 FL (ref 78–100)
PLATELET # BLD AUTO: 267 10E9/L (ref 150–450)
POTASSIUM SERPL-SCNC: 4.1 MMOL/L (ref 3.4–5.3)
RBC # BLD AUTO: 4.58 10E12/L (ref 4.4–5.9)
SODIUM SERPL-SCNC: 139 MMOL/L (ref 133–144)
WBC # BLD AUTO: 7.7 10E9/L (ref 4–11)

## 2019-04-22 PROCEDURE — 80048 BASIC METABOLIC PNL TOTAL CA: CPT

## 2019-04-22 PROCEDURE — 25800029 ZZH RX IP 258 OP 250: Performed by: INTERNAL MEDICINE

## 2019-04-22 PROCEDURE — 27210794 ZZH OR GENERAL SUPPLY STERILE: Performed by: INTERNAL MEDICINE

## 2019-04-22 PROCEDURE — 93010 ELECTROCARDIOGRAM REPORT: CPT | Performed by: INTERNAL MEDICINE

## 2019-04-22 PROCEDURE — 40000235 ZZH STATISTIC TELEMETRY

## 2019-04-22 PROCEDURE — 25000128 H RX IP 250 OP 636

## 2019-04-22 PROCEDURE — 93005 ELECTROCARDIOGRAM TRACING: CPT

## 2019-04-22 PROCEDURE — 85610 PROTHROMBIN TIME: CPT | Mod: QW

## 2019-04-22 PROCEDURE — 93621 COMP EP EVL L PAC&REC C SINS: CPT | Mod: 26 | Performed by: INTERNAL MEDICINE

## 2019-04-22 PROCEDURE — C1730 CATH, EP, 19 OR FEW ELECT: HCPCS | Performed by: INTERNAL MEDICINE

## 2019-04-22 PROCEDURE — 40000065 ZZH STATISTIC EKG NON-CHARGEABLE

## 2019-04-22 PROCEDURE — 99152 MOD SED SAME PHYS/QHP 5/>YRS: CPT | Performed by: INTERNAL MEDICINE

## 2019-04-22 PROCEDURE — 93613 INTRACARDIAC EPHYS 3D MAPG: CPT | Performed by: INTERNAL MEDICINE

## 2019-04-22 PROCEDURE — 25000125 ZZHC RX 250: Performed by: INTERNAL MEDICINE

## 2019-04-22 PROCEDURE — 36415 COLL VENOUS BLD VENIPUNCTURE: CPT

## 2019-04-22 PROCEDURE — 93621 COMP EP EVL L PAC&REC C SINS: CPT | Performed by: INTERNAL MEDICINE

## 2019-04-22 PROCEDURE — 93653 COMPRE EP EVAL TX SVT: CPT | Performed by: INTERNAL MEDICINE

## 2019-04-22 PROCEDURE — C1732 CATH, EP, DIAG/ABL, 3D/VECT: HCPCS | Performed by: INTERNAL MEDICINE

## 2019-04-22 PROCEDURE — 99152 MOD SED SAME PHYS/QHP 5/>YRS: CPT

## 2019-04-22 PROCEDURE — 40000852 ZZH STATISTIC HEART CATH LAB OR EP LAB

## 2019-04-22 PROCEDURE — 85027 COMPLETE CBC AUTOMATED: CPT

## 2019-04-22 PROCEDURE — 40000104 ZZH STATISTIC MODERATE SEDATION < 10 MIN

## 2019-04-22 PROCEDURE — 99153 MOD SED SAME PHYS/QHP EA: CPT

## 2019-04-22 RX ORDER — NALOXONE HYDROCHLORIDE 0.4 MG/ML
.1-.4 INJECTION, SOLUTION INTRAMUSCULAR; INTRAVENOUS; SUBCUTANEOUS
Status: DISCONTINUED | OUTPATIENT
Start: 2019-04-22 | End: 2019-04-22 | Stop reason: HOSPADM

## 2019-04-22 RX ORDER — ACETAMINOPHEN 325 MG/1
650 TABLET ORAL EVERY 4 HOURS PRN
Status: DISCONTINUED | OUTPATIENT
Start: 2019-04-22 | End: 2019-04-22 | Stop reason: HOSPADM

## 2019-04-22 RX ORDER — LIDOCAINE 40 MG/G
CREAM TOPICAL
Status: DISCONTINUED | OUTPATIENT
Start: 2019-04-22 | End: 2019-04-22 | Stop reason: HOSPADM

## 2019-04-22 RX ORDER — FENTANYL CITRATE 50 UG/ML
INJECTION, SOLUTION INTRAMUSCULAR; INTRAVENOUS
Status: DISCONTINUED | OUTPATIENT
Start: 2019-04-22 | End: 2019-04-22 | Stop reason: HOSPADM

## 2019-04-22 RX ORDER — DOBUTAMINE HYDROCHLORIDE 200 MG/100ML
5-40 INJECTION INTRAVENOUS CONTINUOUS PRN
Status: DISCONTINUED | OUTPATIENT
Start: 2019-04-22 | End: 2019-04-22 | Stop reason: HOSPADM

## 2019-04-22 RX ORDER — SODIUM CHLORIDE 450 MG/100ML
INJECTION, SOLUTION INTRAVENOUS CONTINUOUS
Status: DISCONTINUED | OUTPATIENT
Start: 2019-04-22 | End: 2019-04-22 | Stop reason: HOSPADM

## 2019-04-22 RX ADMIN — MIDAZOLAM 1 MG: 1 INJECTION INTRAMUSCULAR; INTRAVENOUS at 12:00

## 2019-04-22 RX ADMIN — FENTANYL CITRATE 50 MCG: 50 INJECTION, SOLUTION INTRAMUSCULAR; INTRAVENOUS at 11:15

## 2019-04-22 RX ADMIN — MIDAZOLAM 1 MG: 1 INJECTION INTRAMUSCULAR; INTRAVENOUS at 11:15

## 2019-04-22 RX ADMIN — FENTANYL CITRATE 25 MCG: 50 INJECTION, SOLUTION INTRAMUSCULAR; INTRAVENOUS at 11:40

## 2019-04-22 RX ADMIN — FENTANYL CITRATE 50 MCG: 50 INJECTION, SOLUTION INTRAMUSCULAR; INTRAVENOUS at 11:25

## 2019-04-22 RX ADMIN — SODIUM CHLORIDE: 4.5 INJECTION, SOLUTION INTRAVENOUS at 07:20

## 2019-04-22 RX ADMIN — FENTANYL CITRATE 25 MCG: 50 INJECTION, SOLUTION INTRAMUSCULAR; INTRAVENOUS at 12:00

## 2019-04-22 RX ADMIN — MIDAZOLAM 1 MG: 1 INJECTION INTRAMUSCULAR; INTRAVENOUS at 11:25

## 2019-04-22 RX ADMIN — LIDOCAINE HYDROCHLORIDE 10 ML: 10 INJECTION, SOLUTION EPIDURAL; INFILTRATION; INTRACAUDAL; PERINEURAL at 11:21

## 2019-04-22 RX ADMIN — MIDAZOLAM 1 MG: 1 INJECTION INTRAMUSCULAR; INTRAVENOUS at 11:40

## 2019-04-22 ASSESSMENT — MIFFLIN-ST. JEOR: SCORE: 1686.3

## 2019-04-22 NOTE — PROGRESS NOTES
0748 pt denies pain. Procedure for ablation explained to pt and wife. Oriented to unit, call light in reach. Wife and dgtr here, wife  and to be with him after discharge and overnight. Pt took aspirin and warfarin last night. inr pt of care 2.9.  1255  Pt back to room about 1245. Denies pain. Sleepy. VSS. Right groin site drsg CDI, soft, no bleed or hematoma. Given sip water. Needs o2 on at this time.  [id number] good, o2 off. Pt hob up to 30 degrees and meal given. Denies pain. Groin site unchanged. Report to zhanna

## 2019-04-22 NOTE — DISCHARGE INSTRUCTIONS
Atrial Flutter Ablation Discharge Instructions - Femoral     After you go home:      Have an adult stay with you until tomorrow.    You may resume your normal diet.       For 24 hours - due to the sedation you received:    Relax and take it easy.    Do NOT make any important or legal decisions.    Do NOT drive or operate machines at home or at work.    Do NOT drink alcohol.    Care of Groin Puncture Site:      For the first 24 hrs - check the puncture site every 1-2 hours while awake.    For 2 days, when you cough, sneeze, laugh or move your bowels, hold your hand over the puncture site and press firmly.    Remove the bandaid after 24 hours. If there is minor oozing, apply another bandaid and remove it after 12 hours.    It is normal to have a small bruise or pea size lump at the site.    You may shower tomorrow.  Do NOT take a bath, or use a hot tub or pool for at least 3 days. Do NOT scrub the site. Do not use lotion or powder near the puncture site.    Activity:            For 2 days:    No stooping or squatting    Do NOT do any heavy activity such as exercise, lifting, or straining.     No housework, yard work or any activity that make you sweat    Do NOT lift more than 10 pounds    Bleeding:      If you start bleeding from the site in your groin, lie down flat and press firmly on the site for 10 minutes.     Once bleeding stops, lay flat for 2 hours.    Call [facility] as soon as you can.       Call 911 right away if you have heavy bleeding or bleeding that does not stop.      Medicines:      Take your medications, including blood thinners, unless your provider tells you not to.    If you have stopped any medicines, check with your provider about when to restart them. Resume your Warfarin at your usual dose schedule and time.    If you have pain or shortness of breath, you may take Advil (ibuprofen) or Tylenol (acetaminophen).    Follow Up Appointments:      An appointment has been set up for you for  follow-up care    You will receive a phone call tomorrow morning from an RN - Sahil Robledo or Gema.    Call the clinic if:      You have increased pain or a large or growing hard lump around the site.    The site is red, swollen, hot or tender.    Blood or fluid is draining from the site.    You have chills or a fever greater than 101 F (38 C).    Your leg feels numb, cool or changes color.    Increased pain in the chest and/or groin.    Increased shortness of breath    Chest pain not relieved by Tylenol or Advil    New pain in the back or belly that you cannot control with Tylenol.    Recurrent irregular or fast heart rate (AFlutter) lasting over 2 hours.    Any questions or concerns.    Heart rhythms:    You may have some irregular heartbeats. These feel very strong. They may make you feel that the fast heart rhythm is going to start again.  Give it time. The irregular beats should occur less often.       [facility]:    [phone] ( 8am-5pm M-F)  Sahil Robledo or Gema    [phone] [facility] (7 days a week)

## 2019-04-22 NOTE — PROGRESS NOTES
Report received from JUSTIN Sheridan. Pt s/p AFlutter ablation.  Right groin area soft & flat.     Discharge teaching & instructions given to both pt & wife.  All questions & concerns addressed.    Pt off bedrest at 16:30.  OOB - steady on feet. Ambulated in halls to bathroom with good marciano. No change in puncture site assessment with activity.    Pt discharged per w/c to private vehicle. All personal belongings taken w/ pt.

## 2019-04-22 NOTE — PROGRESS NOTES
Typical CTI atrial flutter.   Successful ablation.    No apparent complication.  INR = 2.9    Plan:  - bedrest x 4 hrs  - continue warfarin and other meds  - home later today, if all is well

## 2019-04-23 ENCOUNTER — TELEPHONE (OUTPATIENT)
Dept: CARDIOLOGY | Facility: CLINIC | Age: 79
End: 2019-04-23

## 2019-04-23 NOTE — TELEPHONE ENCOUNTER
1:15 pm- Pt called back and spoke about discharge instructions post A Flutter Ablation.  . Patient reminded that there is no driving for 2 days and no lifting, pushing or pulling of more than 10 pounds for 3 days. Patient reminded to call with any concerns or problems including, difficulty swallowing, groin bleed or swelling, increased shortness of breath, fever greater than 101, Arrhythmias  lasting longer than 2-3 hours. Pt made aware to call A Fib RN, if follow up appointments need to be changed. Currently will f/u in [place] 5/22 at 8:10 am w Anabell Ramachandran AURELIA. Pt has no questions at this time. Kylie 04/23/19

## 2019-04-24 LAB
INTERPRETATION ECG - MUSE: NORMAL
INTERPRETATION ECG - MUSE: NORMAL

## 2019-04-29 DIAGNOSIS — I50.32 CHRONIC DIASTOLIC CONGESTIVE HEART FAILURE (H): ICD-10-CM

## 2019-04-29 RX ORDER — FUROSEMIDE 40 MG
20 TABLET ORAL DAILY
Qty: 45 TABLET | Refills: 3 | Status: SHIPPED | OUTPATIENT
Start: 2019-04-29 | End: 2019-05-30

## 2019-05-09 ENCOUNTER — TELEPHONE (OUTPATIENT)
Dept: PEDIATRICS | Facility: CLINIC | Age: 79
End: 2019-05-09

## 2019-05-09 NOTE — TELEPHONE ENCOUNTER
Prior Authorization Retail Medication Request    Medication/Dose: fluocinonide (LIDEX) 0.05 % ointment  ICD code (if different than what is on RX):  [L30.9]  Previously Tried and Failed:  betamethasone valerate (VALISONE) 0.1 % lotion  Rationale:  [L30.9]    Insurance Name:  Medicare   Insuance ID:  [id number]    Insurance Name: [facility]  Insuance ID:[id number]    Pharmacy Information (if different than what is on RX)  Name:    Phone:

## 2019-05-10 NOTE — TELEPHONE ENCOUNTER
Called pharmacy and changed rx to the alternative which was the lidex cream, instead of ointment.    Pt informed.    Rain Hurtado, RN - [facility]

## 2019-05-10 NOTE — TELEPHONE ENCOUNTER
The pt was calling and he is wondering if his care team can write him an rx for a cream to be sent to his pharmacy.   Robyn Singh on 5/10/2019 at 2:51 PM

## 2019-05-10 NOTE — TELEPHONE ENCOUNTER
[facility] called and said the copay for this prescription was very high and suggested alternative to switch to cream.

## 2019-05-11 DIAGNOSIS — R33.9 URINARY RETENTION: ICD-10-CM

## 2019-05-12 NOTE — TELEPHONE ENCOUNTER
"Requested Prescriptions   Pending Prescriptions Disp Refills     tamsulosin (FLOMAX) 0.4 MG capsule [Pharmacy Med Name: TAMSULOSIN 0.4MG CAPSULES] 90 capsule 0     Sig: TAKE 1 CAPSULE BY MOUTH EVERY DAY  Last Written Prescription Date:  05/08/2018  Last Fill Quantity: 90 capsule,  # refills: 3   Last office visit: 5/31/2018 with prescribing provider:     Tu Reyes MD                     Future Office Visit:   Next 5 appointments (look out 90 days)    May 28, 2019  9:30 AM CDT  SHORT with Tu Reyes MD  [facility] ([facility]) [address]  [address]  [place] [address]  [phone]   Jul 08, 2019  9:30 AM CDT  Return Visit with Calderon Santo MD  [facility] ([facility]) [address]  [place] [address]  [phone]                Alpha Blockers Passed - 5/11/2019 12:42 PM        Passed - Blood pressure under 140/90 in past 12 months     BP Readings from Last 3 Encounters:   04/22/19 95/76   04/15/19 144/78   02/05/19 128/70                 Passed - Recent (12 mo) or future (30 days) visit within the authorizing provider's specialty     Patient had office visit in the last 12 months or has a visit in the next 30 days with authorizing provider or within the authorizing provider's specialty.  See \"Patient Info\" tab in inbasket, or \"Choose Columns\" in Meds & Orders section of the refill encounter.              Passed - Patient does not have Tadalafil, Vardenafil, or Sildenafil on their medication list        Passed - Medication is active on med list        Passed - Patient is 18 years of age or older          "

## 2019-05-13 DIAGNOSIS — R33.9 URINARY RETENTION: ICD-10-CM

## 2019-05-13 RX ORDER — TAMSULOSIN HYDROCHLORIDE 0.4 MG/1
CAPSULE ORAL
Qty: 30 CAPSULE | Refills: 0 | Status: SHIPPED | OUTPATIENT
Start: 2019-05-13 | End: 2019-05-30

## 2019-05-13 NOTE — TELEPHONE ENCOUNTER
Flomax  Medication is being filled for 1 time refill only due to:  Patient needs to be seen because it has been more than one year since last visit.    Next 5 appointments (look out 90 days)    May 28, 2019  9:30 AM CDT  SHORT with Tu Reyes MD  [facility] ([facility]) [address]  [facility] [address]  [phone]   Jul 08, 2019  9:30 AM CDT  Return Visit with Calderon Santo MD  [facility] ([facility]) [address]  [facility] [id number] [phone]        Any Martel RN, BSN

## 2019-05-14 RX ORDER — TAMSULOSIN HYDROCHLORIDE 0.4 MG/1
CAPSULE ORAL
Refills: 0 | OUTPATIENT
Start: 2019-05-14

## 2019-05-14 NOTE — TELEPHONE ENCOUNTER
"Requested Prescriptions   Pending Prescriptions Disp Refills     tamsulosin (FLOMAX) 0.4 MG capsule [Pharmacy Med Name: TAMSULOSIN 0.4MG CAPSULES]  Last Written Prescription Date:  5/13/19  Last Fill Quantity: 30,  # refills: 0    Last office visit: 5/31/2018 with prescribing provider:  Tu Reyes MD        Future Office Visit:   Next 5 appointments (look out 90 days)    May 28, 2019  9:30 AM CDT  SHORT with Tu Reyes MD  [facility] [address]  [address]  [place] [address]  [phone]   Jul 08, 2019  9:30 AM CDT  Return Visit with Calderon Santo MD  [facility] ([facility]) [address]  [facility] [facility] [id number] [phone]          90 capsule 0     Sig: TAKE 1 CAPSULE BY MOUTH EVERY DAY.       Alpha Blockers Passed - 5/13/2019  5:55 PM        Passed - Blood pressure under 140/90 in past 12 months     BP Readings from Last 3 Encounters:   04/22/19 95/76   04/15/19 144/78   02/05/19 128/70                 Passed - Recent (12 mo) or future (30 days) visit within the authorizing provider's specialty     Patient had office visit in the last 12 months or has a visit in the next 30 days with authorizing provider or within the authorizing provider's specialty.  See \"Patient Info\" tab in inbasket, or \"Choose Columns\" in Meds & Orders section of the refill encounter.              Passed - Patient does not have Tadalafil, Vardenafil, or Sildenafil on their medication list        Passed - Medication is active on med list        Passed - Patient is 18 years of age or older          "

## 2019-05-21 ENCOUNTER — TRANSFERRED RECORDS (OUTPATIENT)
Dept: HEALTH INFORMATION MANAGEMENT | Facility: CLINIC | Age: 79
End: 2019-05-21

## 2019-05-21 ENCOUNTER — ANTICOAGULATION THERAPY VISIT (OUTPATIENT)
Dept: NURSING | Facility: CLINIC | Age: 79
End: 2019-05-21
Payer: MEDICARE

## 2019-05-21 DIAGNOSIS — Z95.2 S/P AORTIC VALVE REPLACEMENT: ICD-10-CM

## 2019-05-21 DIAGNOSIS — I48.91 AF (ATRIAL FIBRILLATION) (H): ICD-10-CM

## 2019-05-21 DIAGNOSIS — Z95.2 S/P MITRAL VALVE REPLACEMENT: ICD-10-CM

## 2019-05-21 DIAGNOSIS — Z79.01 LONG TERM CURRENT USE OF ANTICOAGULANT THERAPY: Primary | ICD-10-CM

## 2019-05-21 LAB — INR POINT OF CARE: 3.3 (ref 0.86–1.14)

## 2019-05-21 PROCEDURE — 85610 PROTHROMBIN TIME: CPT | Mod: QW

## 2019-05-21 PROCEDURE — 99207 ZZC NO CHARGE NURSE ONLY: CPT

## 2019-05-21 PROCEDURE — 36416 COLLJ CAPILLARY BLOOD SPEC: CPT

## 2019-05-21 RX ORDER — WARFARIN SODIUM 5 MG/1
TABLET ORAL
Qty: 135 TABLET | Refills: 3
Start: 2019-05-21 | End: 2019-10-08

## 2019-05-21 NOTE — PROGRESS NOTES
ANTICOAGULATION FOLLOW-UP CLINIC VISIT    Patient Name:  Fausto Farr  Date:  5/21/2019  Contact Type:  Face to Face    SUBJECTIVE:  Patient Findings     Comments:   The patient was assessed for   diet, medication,   missed or extra doses,   bruising or bleeding,   with no problem findings.          Clinical Outcomes     Comments:   The patient was assessed for   diet, medication,   missed or extra doses,   bruising or bleeding,   with no problem findings.             OBJECTIVE    INR Protime   Date Value Ref Range Status   05/21/2019 3.3 (A) 0.86 - 1.14 Final       ASSESSMENT / PLAN  INR assessment THER    Recheck INR In: 5 WEEKS    INR Location Clinic      Anticoagulation Summary  As of 5/21/2019    INR goal:   2.5-3.5   TTR:   55.2 % (3.1 y)   INR used for dosing:   3.3 (5/21/2019)   Warfarin maintenance plan:   5 mg (5 mg x 1) every Mon, Fri; 7.5 mg (5 mg x 1.5) all other days   Full warfarin instructions:   5 mg every Mon, Fri; 7.5 mg all other days   Weekly warfarin total:   47.5 mg   No change documented:   Caryn Campos RN   Plan last modified:   Caryn Campos RN (1/3/2019)   Next INR check:   6/25/2019   Priority:   INR   Target end date:   Indefinite    Indications    AF (atrial fibrillation) (H) [I48.91]  S/P mitral valve replacement [Z95.2]  Long term current use of anticoagulant therapy [Z79.01]             Anticoagulation Episode Summary     INR check location:       Preferred lab:       Send INR reminders to:   [facility]    Comments:   5mg tabs - andrade dose // transfer from [facility] // [facility] // CALENDAR      Anticoagulation Care Providers     Provider Role Specialty Phone number    Tu Reyes MD  Internal Medicine [phone]            See the Encounter Report to view Anticoagulation Flowsheet and Dosing Calendar (Go to Encounters tab in chart review, and find the Anticoagulation Therapy Visit)    INR is therapeutic today. Patient will continue same  maintenance dose.   Follow up in 5 weeks or sooner if needed.        Caryn Campos RN

## 2019-05-21 NOTE — PROGRESS NOTES
"HPI:  Fausto Farr is a 79 year old male who presents for atrial flutter follow up.  He is a patient of Dr. Santo, has seen Dr. Vasquez (EP) and Dr. Wyatt and Dr. Whitman (vascular clinic).      His medical history includes   1.  Valvular heart disease.  AVR in 2006 with subsequent perivalvular leak and redo mechanical AVR and concomitant mechanical MVR in 2013.   2.  Coronary artery disease with CABG (LIMA to LAD and radial graft to RCA) in 2006.   3.  Chronic diastolic heart failure.  LVEF is 55%-60%.   4.  Previous endovascular AAA repair.  In 2010 by Dr. Mays from Vascular Surgery as well as IR.   Has   5.  AV conduction disease with bifascicular block and prolonged PA.    6.  Obstructive sleep apnea with use of CPAP.   7.  Hypertension.   8.  Dyslipidemia.   9.  Chronic back pain.   10.  Mild internal carotid artery disease.  Severe stenosis of left external carotid artery.   11.  Typical atrial flutter, s/p typcial CTI atrial flutter ablation (4/2019)  12.  Varicose veins with venous insufficiency.  Treatment with VenaSeal closure, sclerotherapy and phlebectomy by Dr. Whitman in 01/2019.  Per notes, pt may not feel the full symptomatic benefit.         Diagnostics:  ECHO (2018) revealed EF 55%.  right ventricle is mildly dilated. Mildly decreased right ventricular systolic function,  Mechanical mitral valve. 27mm St Solo. Both leaflets open well, mean gradient 4-5mmHg (normal). Physiologic MR. No vegetations, There is a mechanical aortic valve. 21mm St Solo. Views are limited of the valve leaflets. Mean gradient 11mmHg. No AI. Probably no vegetations  Nuclear stress test (4/2019) revealed fixed inferior septal defect and a second fixed apical defect. There is no significant reversibility on this study to suggest ischemia.  EF 59%.       Today he presents stating he is unsure if he feels any \"different\" than prior to his ablation.  However he denies any dizziness lightheaded, and has not had any episodes of loss " "of consciousness which were reported symptoms prior to the ablation.  Prior to his ablation he also described symptoms of \"feeling off\" and he denies this symptom today.  He continues to feel fatigued and BRISENO with activities of high exertion and denies associated symptoms of chest pain/tightness, nausea, or dizziness.  In reviewing the notes, this concerns is not new.  When he uses his treadmill, he stops due to back and knee pain which is consistent with stress ECG completed in summer of 2018 which was ordered by Dr. Santo.  He continues to have LE edema left is worse than left.  He is complaint with CPAP and his medications.  He is tolerating his warfarin with no bleeding and no stroke like symptoms.        ASSESSMENT AND PLAN    Atrial flutter s/p successful CTI ablation by Dr. Vasquez on 4/22/2019    ECG today reveals sinus rhythm with first degree AV block with bifascicular block    Continue on warfarin due to mechanical MVR and AVR    Status post mechanical AVR and MVR    On Warfarin with therapeutic INR's between 2.5 and 3.5    ECHO completed in 2018    NSVT    Revealed On monitor and patient asymptomatic.     Taking metoprolol    Stress test revealed no ischemia    Coronary Artery Disease s/p CABG     No concerns at this time. asymptomatic.      Low exercise tolerance due to back pain    Taking Aspirin, Zetia, metoprolol, Crestor    Last LDL 59 (7/2018)     Stress test revealed no ischemia    AV conduction disease    ECG today sinus rhythm with first degree AV block with bifascicular block    Dr. Vasquez has discussed with patient the possibility of needing a pacemaker in the future    Denies any symptoms.    Plan:    Continue with current medications    Follow up with Dr. Santo in July 2019    Patient expresses understanding and agreement with the plan.     I appreciate the chance to help with Fausto Farr Please let me know if you have any questions or concerns.    Anabell Xiao, ADELE, CNP    This " note was completed in part using Dragon voice recognition software. Although reviewed after completion, some word and grammatical errors may occur.    Orders Placed This Encounter   Procedures     EKG 12-lead complete w/read - Clinics (performed today)     No orders of the defined types were placed in this encounter.    There are no discontinued medications.      Encounter Diagnoses   Name Primary?     Chronic atrial fibrillation (H) Yes     Valvular heart disease      Heart murmur      AF (atrial fibrillation) (H)        CURRENT MEDICATIONS:  Current Outpatient Medications   Medication Sig Dispense Refill     aspirin 81 MG EC tablet Take 1 tablet (81 mg) by mouth daily 1 tablet 0     betamethasone valerate (VALISONE) 0.1 % lotion APPLY TOPICALLY TWICE DAILY PRN 60 mL 3     ezetimibe (ZETIA) 10 MG tablet Take 1 tablet (10 mg) by mouth daily 90 tablet 0     furosemide (LASIX) 40 MG tablet Take 0.5 tablets (20 mg) by mouth daily 45 tablet 3     HERBALS White willow bark, Turmeric, Albina, botswellia & Yucca mixed powder(1.5 tsp mixed in cranberry juice or orange juice) every day 60 each 11     mesalamine (ASACOL HD) 800 MG EC tablet Take 1 tablet (800 mg) by mouth 2 times daily 180 tablet 0     metoprolol tartrate (LOPRESSOR) 25 MG tablet Take 1 tablet (25 mg) by mouth 2 times daily 180 tablet 3     rosuvastatin (CRESTOR) 40 MG tablet Take 1 tablet (40 mg) by mouth daily 90 tablet 3     tamsulosin (FLOMAX) 0.4 MG capsule TAKE 1 CAPSULE BY MOUTH EVERY DAY 30 capsule 0     warfarin (COUMADIN) 5 MG tablet Take 5 mg (1 tablet) every Mon, Fri; 7.5 mg (1.5 tablets) all other days or as directed by INR Clinic 135 tablet 3       ALLERGIES     Allergies   Allergen Reactions     Bees Anaphylaxis       PAST MEDICAL HISTORY:  Past Medical History:   Diagnosis Date     Abdominal pain      Abnormal ECG     RBBB, 1st degree AVB, Left axis deviation     Anemia     currently taking iron     Arrhythmia     pac, pvc     Back pain      since 1980     BPH (benign prostatic hyperplasia)      Bruit      CAD (coronary artery disease)      Cellulitis 10/18/12     Cellulitis 05/2018    GrpB strep LLE cellulitis  negative RACHAEL for veg     Chronic venous insufficiency     bilat lower extremities     Congestive heart failure (H)      Contact dermatitis and other eczema, due to unspecified cause      Diaphragmatic hernia without mention of obstruction or gangrene      Diastolic HF (heart failure) (H)      Gastric ulcer      Glucose intolerance (impaired glucose tolerance)      Heart murmur 9/16/13    valvular heart disease     Hyperlipidaemia      Hypertension 8/6/13     Lumbago      Malaise and fatigue      Metabolic syndrome      Mobitz (type) I (Wenckebach's) atrioventricular block     and RBBB     Nocturia 10/18/12     Nocturia      Nonallopathic lesion of cervical region      Nonallopathic lesion of lumbar region      Nonallopathic lesion of pelvic region, not elsewhere classified      Nonallopathic lesion of rib cage      Nonallopathic lesion of sacral region      GAGE (obstructive sleep apnea)     pt declined cpap     Paroxysmal atrial fibrillation (H) 10/18/12     Prostate cancer (H) 2008    radiation seed, XRT      PVD (peripheral vascular disease) (H)      RBBB      Rotator cuff strain     and sprain     S/P AAA repair      S/P aortic valve replacement 2006    developed perivalve leak and MS, therefore redo surg 2013     S/P CABG (coronary artery bypass graft) 2006    Lima-Lad, RA-Rca     Sciatica of left side     since 2000     Sepsis due to group B Streptococcus (H) 5/19/2018     Ulcerative colitis (H)      Varicose veins of bilateral lower extremities with other complications     s/p RLE vein stripping     Vitamin D deficiency        PAST SURGICAL HISTORY:  Past Surgical History:   Procedure Laterality Date     AORTIC VALVE REPLACEMENT  1/3/06    redo AVR SJM 21mm and SJ MVR 27mm in 2013SJM 21([id number]):AVR, SJM 27 :MVR-     ARTHROPLASTY  KNEE      right knee     BACK SURGERY  Oct 2015    Fusion L4-5, laminectomy L2, L3     BYPASS GRAFT ARTERY CORONARY  10/2013    reimplantation of radial artery graft to RCA     C CABG, VEIN, TWO  1/3/06    Left radial to RCA, LIMA to LAD (RA to RCA reimplanted at time of 2013 surg)     CARDIAC CATHERIZATION  11/2005    Stent placed to RCA     CARDIAC CATHERIZATION  04/2013    Occluded RCA, patent LIMA to LAD and radial graft to PDA     CARPAL TUNNEL RELEASE RT/LT  1994     COLONOSCOPY  8-22-11     CYSTOSCOPY FLEXIBLE  10/16/2013    Procedure: CYSTOSCOPY FLEXIBLE;  FLEXIBLE CYSTOSCOPY / DILATION OF URETHRA / INSERTION OF LESLIE;  Surgeon: Cooper Wallace MD;  Location:  OR     ENDOVASCULAR REPAIR ANEURYSM ABDOMINAL AORTA  2006     ENDOVASCULAR REPAIR, INFRARENAL ABDOMINAL AORTIC ANEURYSM/DISSECTION; MODULAR BIFURCATED PROSTHESIS      AAA repair endovascular     ENT SURGERY       EP ABLATION ATRIAL FLUTTER N/A 4/22/2019    Procedure: EP Ablation Atrial Flutter;  Surgeon: Jessy Vasquez MD;  Location:  [facility]     GENITOURINARY SURGERY  6/16/08    radioactive seeding     HEAD & NECK SURGERY  1997    vocal cord polypectomy     KNEE SURGERY  2001 Right knee arthroscopy     OPTICAL TRACKING SYSTEM FUSION SPINE POSTERIOR LUMBAR THREE+ LEVELS N/A 10/29/2015    Procedure: OPTICAL TRACKING SYSTEM FUSION SPINE POSTERIOR LUMBAR THREE+ LEVELS;  Surgeon: Walt Garcia MD;  Location:  OR     PROSTATE SURGERY  06/16/2008 Radioactive seeding     PROSTATE SURGERY      radioactive seeding 6/16/08     REPAIR ANEURYSM ABDOMINAL AORTA  06/08     REPAIR VALVE MITRAL  10/16/2013    SJM 21([id number]):AVR, SJM 27  501:MVRProcedure: REPAIR VALVE MITRAL;  REDO STERNOTOMY/REDO AORTIC VALVE REPLACEMENT/ MITRAL VALVE REPLACEMENT/REIMPLANTATION OF RIGHT CORONARY ARTERY BYPASS WITH RACHAEL ( ON PUMP);  Surgeon: Viet Singh MD;  Location:  OR     REPLACE VALVE AORTIC  10/16/2013    Procedure: REPLACE  VALVE AORTIC;;  Surgeon: Viet Singh MD;  Location: [facility]     SURGERY GENERAL IP CONSULT  2008 Excision aneurysm abdominal aorta     SURGERY GENERAL IP CONSULT   Vocal cord polypectomy     VASCULAR SURGERY  ,      varicose vein stripping       FAMILY HISTORY:  Family History   Problem Relation Age of Onset     Coronary Artery Disease Father         CABG     Heart Disease Father         Pacemaker     Other Cancer Daughter      Heart Disease Brother      Other - See Comments Grandchild        SOCIAL HISTORY:  Social History     Socioeconomic History     Marital status:      Spouse name: None     Number of children: None     Years of education: None     Highest education level: None   Occupational History     None   Social Needs     Financial resource strain: None     Food insecurity:     Worry: None     Inability: None     Transportation needs:     Medical: None     Non-medical: None   Tobacco Use     Smoking status: Former Smoker     Packs/day: 1.00     Years: 40.00     Pack years: 40.00     Start date: 4/15/1962     Last attempt to quit: 10/23/2002     Years since quittin.5     Smokeless tobacco: Never Used   Substance and Sexual Activity     Alcohol use: Yes     Comment: a couple beers per week (socially)     Drug use: No     Sexual activity: Never   Lifestyle     Physical activity:     Days per week: None     Minutes per session: None     Stress: None   Relationships     Social connections:     Talks on phone: None     Gets together: None     Attends Nondenominational service: None     Active member of club or organization: None     Attends meetings of clubs or organizations: None     Relationship status: None     Intimate partner violence:     Fear of current or ex partner: None     Emotionally abused: None     Physically abused: None     Forced sexual activity: None   Other Topics Concern     Parent/sibling w/ CABG, MI or angioplasty before 65F 55M? Yes     Comment: Brother had  "bypass at 55      Service Not Asked     Blood Transfusions Not Asked     Caffeine Concern No     Comment: 6-8 cups of half and half per day     Occupational Exposure Not Asked     Hobby Hazards Not Asked     Sleep Concern Not Asked     Stress Concern Not Asked     Weight Concern Not Asked     Special Diet No     Back Care Not Asked     Exercise No     Bike Helmet Not Asked     Seat Belt Not Asked     Self-Exams Not Asked   Social History Narrative     None       Review of Systems:  Skin:  Negative     Eyes:  Positive for glasses  ENT:  Positive for hearing loss  Respiratory:  Positive for dyspnea on exertion;sleep apnea;CPAP  Cardiovascular:    Positive for  Gastroenterology: Negative    Genitourinary:  not assessed    Musculoskeletal:  Positive for back pain;arthritis;nocturnal cramping  Neurologic:  Negative    Psychiatric:  not assessed    Heme/Lymph/Imm:  Positive for allergies  Endocrine:  Negative      Physical Exam:  Vitals: /62 (BP Location: Right arm)   Pulse 76   Ht 1.664 m (5' 5.5\")   Wt 103.2 kg (227 lb 8 oz)   BMI 37.28 kg/m      Constitutional:  cooperative, alert and oriented, well developed, well nourished, in no acute distress obese      Skin:  warm and dry to the touch        Head:  normocephalic        Eyes:  pupils equal and round        ENT:  no pallor or cyanosis        Neck:  JVP normal        Chest:  clear to auscultation        Cardiac: regular rhythm     crisp prosthetic valve sounds            Abdomen:  abdomen soft obese      Vascular:       right radial artery;2+             left radial artery;2+                  Extremities and Back:  no deformities, clubbing, cyanosis, erythema observed        Neurological:  no gross motor deficits          Recent Lab Results:  LIPID RESULTS:  Lab Results   Component Value Date    CHOL 128 07/12/2018    HDL 36 (L) 07/12/2018    LDL 59 07/12/2018    TRIG 165 (H) 07/12/2018    CHOLHDLRATIO 3.6 06/03/2015       LIVER ENZYME RESULTS:  Lab " Results   Component Value Date    AST 37 05/29/2018    ALT 28 07/12/2018       CBC RESULTS:  Lab Results   Component Value Date    WBC 7.7 04/22/2019    RBC 4.58 04/22/2019    HGB 13.7 04/22/2019    HCT 40.9 04/22/2019    MCV 89 04/22/2019    MCH 29.9 04/22/2019    MCHC 33.5 04/22/2019    RDW 12.9 04/22/2019     04/22/2019       BMP RESULTS:  Lab Results   Component Value Date     04/22/2019    POTASSIUM 4.1 04/22/2019    CHLORIDE 107 04/22/2019    CO2 26 04/22/2019    ANIONGAP 6 04/22/2019     (H) 04/22/2019    BUN 17 04/22/2019    CR 0.94 04/22/2019    GFRESTIMATED 77 04/22/2019    GFRESTBLACK 89 04/22/2019    AWILDA 9.4 04/22/2019        A1C RESULTS:  Lab Results   Component Value Date    A1C 5.9 04/25/2017       INR RESULTS:  Lab Results   Component Value Date    INR 3.3 (A) 05/21/2019    INR 2.9 (H) 04/22/2019    INR 3.4 (A) 04/19/2019    INR 2.63 (H) 04/15/2019    INR 5.9 07/30/2018           CC  No referring provider defined for this encounter.

## 2019-05-22 ENCOUNTER — OFFICE VISIT (OUTPATIENT)
Dept: CARDIOLOGY | Facility: CLINIC | Age: 79
End: 2019-05-22
Payer: MEDICARE

## 2019-05-22 VITALS
HEART RATE: 76 BPM | WEIGHT: 227.5 LBS | DIASTOLIC BLOOD PRESSURE: 62 MMHG | SYSTOLIC BLOOD PRESSURE: 114 MMHG | HEIGHT: 66 IN | BODY MASS INDEX: 36.56 KG/M2

## 2019-05-22 DIAGNOSIS — I48.3 TYPICAL ATRIAL FLUTTER (H): Primary | ICD-10-CM

## 2019-05-22 PROCEDURE — 99214 OFFICE O/P EST MOD 30 MIN: CPT | Performed by: NURSE PRACTITIONER

## 2019-05-22 PROCEDURE — 93000 ELECTROCARDIOGRAM COMPLETE: CPT | Performed by: NURSE PRACTITIONER

## 2019-05-22 ASSESSMENT — MIFFLIN-ST. JEOR: SCORE: 1686.74

## 2019-05-22 NOTE — LETTER
5/22/2019    Tu Reyes MD  [address] [place] [address]    RE: Fausto Farr       Dear Colleague,    I had the pleasure of seeing Fausto Farr in the [facility].    HPI:  Fausto Farr is a 79 year old male who presents for atrial flutter follow up.  He is a patient of Dr. Santo, has seen Dr. Vasquez (EP) and Dr. Wyatt and Dr. Whitman (vascular clinic).      His medical history includes   1.  Valvular heart disease.  AVR in 2006 with subsequent perivalvular leak and redo mechanical AVR and concomitant mechanical MVR in 2013.   2.  Coronary artery disease with CABG (LIMA to LAD and radial graft to RCA) in 2006.   3.  Chronic diastolic heart failure.  LVEF is 55%-60%.   4.  Previous endovascular AAA repair.  In 2010 by Dr. Mays from Vascular Surgery as well as IR.   Has   5.  AV conduction disease with bifascicular block and prolonged AZ.    6.  Obstructive sleep apnea with use of CPAP.   7.  Hypertension.   8.  Dyslipidemia.   9.  Chronic back pain.   10.  Mild internal carotid artery disease.  Severe stenosis of left external carotid artery.   11.  Typical atrial flutter, s/p typcial CTI atrial flutter ablation (4/2019)  12.  Varicose veins with venous insufficiency.  Treatment with VenaSeal closure, sclerotherapy and phlebectomy by Dr. Whitman in 01/2019.  Per notes, pt may not feel the full symptomatic benefit.         Diagnostics:  ECHO (2018) revealed EF 55%.  right ventricle is mildly dilated. Mildly decreased right ventricular systolic function,  Mechanical mitral valve. 27mm St Solo. Both leaflets open well, mean gradient 4-5mmHg (normal). Physiologic MR. No vegetations, There is a mechanical aortic valve. 21mm St Solo. Views are limited of the valve leaflets. Mean gradient 11mmHg. No AI. Probably no vegetations  Nuclear stress test (4/2019) revealed fixed inferior septal defect and a second fixed apical defect. There is no significant  "reversibility on this study to suggest ischemia.  EF 59%.       Today he presents stating he is unsure if he feels any \"different\" than prior to his ablation.  However he denies any dizziness lightheaded, and has not had any episodes of loss of consciousness which were reported symptoms prior to the ablation.  Prior to his ablation he also described symptoms of \"feeling off\" and he denies this symptom today.  He continues to feel fatigued and BRISENO with activities of high exertion and denies associated symptoms of chest pain/tightness, nausea, or dizziness.  In reviewing the notes, this concerns is not new.  When he uses his treadmill, he stops due to back and knee pain which is consistent with stress ECG completed in summer of 2018 which was ordered by Dr. Santo.  He continues to have LE edema left is worse than left.  He is complaint with CPAP and his medications.  He is tolerating his warfarin with no bleeding and no stroke like symptoms.        ASSESSMENT AND PLAN    Atrial flutter s/p successful CTI ablation by Dr. Vasquez on 4/22/2019    ECG today reveals sinus rhythm with first degree AV block with bifascicular block    Continue on warfarin due to mechanical MVR and AVR    Status post mechanical AVR and MVR    On Warfarin with therapeutic INR's between 2.5 and 3.5    ECHO completed in 2018    NSVT    Revealed On monitor and patient asymptomatic.     Taking metoprolol    Stress test revealed no ischemia    Coronary Artery Disease s/p CABG     No concerns at this time. asymptomatic.      Low exercise tolerance due to back pain    Taking Aspirin, Zetia, metoprolol, Crestor    Last LDL 59 (7/2018)     Stress test revealed no ischemia    AV conduction disease    ECG today sinus rhythm with first degree AV block with bifascicular block    Dr. Vasquez has discussed with patient the possibility of needing a pacemaker in the future    Denies any symptoms.    Plan:    Continue with current medications    Follow up with  " Hansa in July 2019    Patient expresses understanding and agreement with the plan.     I appreciate the chance to help with Fausto Farr Please let me know if you have any questions or concerns.    ADELE Putnam, CNP    This note was completed in part using Dragon voice recognition software. Although reviewed after completion, some word and grammatical errors may occur.    Orders Placed This Encounter   Procedures     EKG 12-lead complete w/read - Clinics (performed today)     No orders of the defined types were placed in this encounter.    There are no discontinued medications.      Encounter Diagnoses   Name Primary?     Chronic atrial fibrillation (H) Yes     Valvular heart disease      Heart murmur      AF (atrial fibrillation) (H)        CURRENT MEDICATIONS:  Current Outpatient Medications   Medication Sig Dispense Refill     aspirin 81 MG EC tablet Take 1 tablet (81 mg) by mouth daily 1 tablet 0     betamethasone valerate (VALISONE) 0.1 % lotion APPLY TOPICALLY TWICE DAILY PRN 60 mL 3     ezetimibe (ZETIA) 10 MG tablet Take 1 tablet (10 mg) by mouth daily 90 tablet 0     furosemide (LASIX) 40 MG tablet Take 0.5 tablets (20 mg) by mouth daily 45 tablet 3     HERBALS White willow bark, Turmeric, Albina, botswellia & Yucca mixed powder(1.5 tsp mixed in cranberry juice or orange juice) every day 60 each 11     mesalamine (ASACOL HD) 800 MG EC tablet Take 1 tablet (800 mg) by mouth 2 times daily 180 tablet 0     metoprolol tartrate (LOPRESSOR) 25 MG tablet Take 1 tablet (25 mg) by mouth 2 times daily 180 tablet 3     rosuvastatin (CRESTOR) 40 MG tablet Take 1 tablet (40 mg) by mouth daily 90 tablet 3     tamsulosin (FLOMAX) 0.4 MG capsule TAKE 1 CAPSULE BY MOUTH EVERY DAY 30 capsule 0     warfarin (COUMADIN) 5 MG tablet Take 5 mg (1 tablet) every Mon, Fri; 7.5 mg (1.5 tablets) all other days or as directed by INR Clinic 135 tablet 3       ALLERGIES     Allergies   Allergen Reactions     Bees  Anaphylaxis       PAST MEDICAL HISTORY:  Past Medical History:   Diagnosis Date     Abdominal pain      Abnormal ECG     RBBB, 1st degree AVB, Left axis deviation     Anemia     currently taking iron     Arrhythmia     pac, pvc     Back pain     since 1980     BPH (benign prostatic hyperplasia)      Bruit      CAD (coronary artery disease)      Cellulitis 10/18/12     Cellulitis 05/2018    GrpB strep LLE cellulitis  negative RACHAEL for veg     Chronic venous insufficiency     bilat lower extremities     Congestive heart failure (H)      Contact dermatitis and other eczema, due to unspecified cause      Diaphragmatic hernia without mention of obstruction or gangrene      Diastolic HF (heart failure) (H)      Gastric ulcer      Glucose intolerance (impaired glucose tolerance)      Heart murmur 9/16/13    valvular heart disease     Hyperlipidaemia      Hypertension 8/6/13     Lumbago      Malaise and fatigue      Metabolic syndrome      Mobitz (type) I (Wenckebach's) atrioventricular block     and RBBB     Nocturia 10/18/12     Nocturia      Nonallopathic lesion of cervical region      Nonallopathic lesion of lumbar region      Nonallopathic lesion of pelvic region, not elsewhere classified      Nonallopathic lesion of rib cage      Nonallopathic lesion of sacral region      GAGE (obstructive sleep apnea)     pt declined cpap     Paroxysmal atrial fibrillation (H) 10/18/12     Prostate cancer (H) 2008    radiation seed, XRT      PVD (peripheral vascular disease) (H)      RBBB      Rotator cuff strain     and sprain     S/P AAA repair      S/P aortic valve replacement 2006    developed perivalve leak and MS, therefore redo surg 2013     S/P CABG (coronary artery bypass graft) 2006    Lima-Lad, RA-Rca     Sciatica of left side     since 2000     Sepsis due to group B Streptococcus (H) 5/19/2018     Ulcerative colitis (H)      Varicose veins of bilateral lower extremities with other complications     s/p RLE vein stripping      Vitamin D deficiency        PAST SURGICAL HISTORY:  Past Surgical History:   Procedure Laterality Date     AORTIC VALVE REPLACEMENT  1/3/06    redo AVR SJM 21mm and SJM MVR 27mm in 2013SJM 21([id number]):AVR, SJM 27 :MVR-     ARTHROPLASTY KNEE      right knee     BACK SURGERY  Oct 2015    Fusion L4-5, laminectomy L2, L3     BYPASS GRAFT ARTERY CORONARY  10/2013    reimplantation of radial artery graft to RCA     C CABG, VEIN, TWO  1/3/06    Left radial to RCA, LIMA to LAD (RA to RCA reimplanted at time of 2013 surg)     CARDIAC CATHERIZATION  11/2005    Stent placed to RCA     CARDIAC CATHERIZATION  04/2013    Occluded RCA, patent LIMA to LAD and radial graft to PDA     CARPAL TUNNEL RELEASE RT/LT  1994     COLONOSCOPY  8-22-11     CYSTOSCOPY FLEXIBLE  10/16/2013    Procedure: CYSTOSCOPY FLEXIBLE;  FLEXIBLE CYSTOSCOPY / DILATION OF URETHRA / INSERTION OF LESLIE;  Surgeon: Cooper Wallace MD;  Location:  OR     ENDOVASCULAR REPAIR ANEURYSM ABDOMINAL AORTA  2006     ENDOVASCULAR REPAIR, INFRARENAL ABDOMINAL AORTIC ANEURYSM/DISSECTION; MODULAR BIFURCATED PROSTHESIS      AAA repair endovascular     ENT SURGERY       EP ABLATION ATRIAL FLUTTER N/A 4/22/2019    Procedure: EP Ablation Atrial Flutter;  Surgeon: Jessy Vasquez MD;  Location:  HEART CARDIAC CATH LAB     GENITOURINARY SURGERY  6/16/08    radioactive seeding     HEAD & NECK SURGERY  1997    vocal cord polypectomy     KNEE SURGERY  2001 Right knee arthroscopy     OPTICAL TRACKING SYSTEM FUSION SPINE POSTERIOR LUMBAR THREE+ LEVELS N/A 10/29/2015    Procedure: OPTICAL TRACKING SYSTEM FUSION SPINE POSTERIOR LUMBAR THREE+ LEVELS;  Surgeon: Walt Garcia MD;  Location:  OR     PROSTATE SURGERY  06/16/2008 Radioactive seeding     PROSTATE SURGERY      radioactive seeding 6/16/08     REPAIR ANEURYSM ABDOMINAL AORTA  06/08     REPAIR VALVE MITRAL  10/16/2013    SJM 21([id number]):AVR, SJM 27 :MVRProcedure: REPAIR VALVE MITRAL;  REDO  STERNOTOMY/REDO AORTIC VALVE REPLACEMENT/ MITRAL VALVE REPLACEMENT/REIMPLANTATION OF RIGHT CORONARY ARTERY BYPASS WITH RACHAEL ( ON PUMP);  Surgeon: Viet Singh MD;  Location:  OR     REPLACE VALVE AORTIC  10/16/2013    Procedure: REPLACE VALVE AORTIC;;  Surgeon: Viet Singh MD;  Location:  OR     SURGERY GENERAL IP CONSULT  2008 Excision aneurysm abdominal aorta     SURGERY GENERAL IP CONSULT   Vocal cord polypectomy     VASCULAR SURGERY  1993     varicose vein stripping       FAMILY HISTORY:  Family History   Problem Relation Age of Onset     Coronary Artery Disease Father         CABG     Heart Disease Father         Pacemaker     Other Cancer Daughter      Heart Disease Brother      Other - See Comments Grandchild        SOCIAL HISTORY:  Social History     Socioeconomic History     Marital status:      Spouse name: None     Number of children: None     Years of education: None     Highest education level: None   Occupational History     None   Social Needs     Financial resource strain: None     Food insecurity:     Worry: None     Inability: None     Transportation needs:     Medical: None     Non-medical: None   Tobacco Use     Smoking status: Former Smoker     Packs/day: 1.00     Years: 40.00     Pack years: 40.00     Start date: 4/15/1962     Last attempt to quit: 10/23/2002     Years since quittin.5     Smokeless tobacco: Never Used   Substance and Sexual Activity     Alcohol use: Yes     Comment: a couple beers per week (socially)     Drug use: No     Sexual activity: Never   Lifestyle     Physical activity:     Days per week: None     Minutes per session: None     Stress: None   Relationships     Social connections:     Talks on phone: None     Gets together: None     Attends Restorationism service: None     Active member of club or organization: None     Attends meetings of clubs or organizations: None     Relationship status: None     Intimate partner  "violence:     Fear of current or ex partner: None     Emotionally abused: None     Physically abused: None     Forced sexual activity: None   Other Topics Concern     Parent/sibling w/ CABG, MI or angioplasty before 65F 55M? Yes     Comment: Brother had bypass at 55      Service Not Asked     Blood Transfusions Not Asked     Caffeine Concern No     Comment: 6-8 cups of half and half per day     Occupational Exposure Not Asked     Hobby Hazards Not Asked     Sleep Concern Not Asked     Stress Concern Not Asked     Weight Concern Not Asked     Special Diet No     Back Care Not Asked     Exercise No     Bike Helmet Not Asked     Seat Belt Not Asked     Self-Exams Not Asked   Social History Narrative     None       Review of Systems:  Skin:  Negative     Eyes:  Positive for glasses  ENT:  Positive for hearing loss  Respiratory:  Positive for dyspnea on exertion;sleep apnea;CPAP  Cardiovascular:    Positive for  Gastroenterology: Negative    Genitourinary:  not assessed    Musculoskeletal:  Positive for back pain;arthritis;nocturnal cramping  Neurologic:  Negative    Psychiatric:  not assessed    Heme/Lymph/Imm:  Positive for allergies  Endocrine:  Negative      Physical Exam:  Vitals: /62 (BP Location: Right arm)   Pulse 76   Ht 1.664 m (5' 5.5\")   Wt 103.2 kg (227 lb 8 oz)   BMI 37.28 kg/m       Constitutional:  cooperative, alert and oriented, well developed, well nourished, in no acute distress obese      Skin:  warm and dry to the touch        Head:  normocephalic        Eyes:  pupils equal and round        ENT:  no pallor or cyanosis        Neck:  JVP normal        Chest:  clear to auscultation        Cardiac: regular rhythm     crisp prosthetic valve sounds            Abdomen:  abdomen soft obese      Vascular:       right radial artery;2+             left radial artery;2+                  Extremities and Back:  no deformities, clubbing, cyanosis, erythema observed        Neurological:  no gross " motor deficits          Recent Lab Results:  LIPID RESULTS:  Lab Results   Component Value Date    CHOL 128 07/12/2018    HDL 36 (L) 07/12/2018    LDL 59 07/12/2018    TRIG 165 (H) 07/12/2018    CHOLHDLRATIO 3.6 06/03/2015       LIVER ENZYME RESULTS:  Lab Results   Component Value Date    AST 37 05/29/2018    ALT 28 07/12/2018       CBC RESULTS:  Lab Results   Component Value Date    WBC 7.7 04/22/2019    RBC 4.58 04/22/2019    HGB 13.7 04/22/2019    HCT 40.9 04/22/2019    MCV 89 04/22/2019    MCH 29.9 04/22/2019    MCHC 33.5 04/22/2019    RDW 12.9 04/22/2019     04/22/2019       BMP RESULTS:  Lab Results   Component Value Date     04/22/2019    POTASSIUM 4.1 04/22/2019    CHLORIDE 107 04/22/2019    CO2 26 04/22/2019    ANIONGAP 6 04/22/2019     (H) 04/22/2019    BUN 17 04/22/2019    CR 0.94 04/22/2019    GFRESTIMATED 77 04/22/2019    GFRESTBLACK 89 04/22/2019    AWILDA 9.4 04/22/2019        A1C RESULTS:  Lab Results   Component Value Date    A1C 5.9 04/25/2017       INR RESULTS:  Lab Results   Component Value Date    INR 3.3 (A) 05/21/2019    INR 2.9 (H) 04/22/2019    INR 3.4 (A) 04/19/2019    INR 2.63 (H) 04/15/2019    INR 5.9 07/30/2018           CC  No referring provider defined for this encounter.                Thank you for allowing me to participate in the care of your patient.    Sincerely,     ADELE Salomon [facility]

## 2019-05-30 ENCOUNTER — OFFICE VISIT (OUTPATIENT)
Dept: PEDIATRICS | Facility: CLINIC | Age: 79
End: 2019-05-30
Payer: MEDICARE

## 2019-05-30 VITALS
OXYGEN SATURATION: 98 % | WEIGHT: 221.2 LBS | HEIGHT: 66 IN | BODY MASS INDEX: 35.55 KG/M2 | DIASTOLIC BLOOD PRESSURE: 62 MMHG | TEMPERATURE: 97.4 F | SYSTOLIC BLOOD PRESSURE: 134 MMHG | HEART RATE: 52 BPM

## 2019-05-30 DIAGNOSIS — R33.9 URINARY RETENTION: ICD-10-CM

## 2019-05-30 DIAGNOSIS — Z13.0 SCREENING FOR DEFICIENCY ANEMIA: ICD-10-CM

## 2019-05-30 DIAGNOSIS — Z95.2 S/P MITRAL VALVE REPLACEMENT: ICD-10-CM

## 2019-05-30 DIAGNOSIS — E78.2 MIXED HYPERLIPIDEMIA: ICD-10-CM

## 2019-05-30 DIAGNOSIS — M54.50 CHRONIC BILATERAL LOW BACK PAIN WITHOUT SCIATICA: Primary | ICD-10-CM

## 2019-05-30 DIAGNOSIS — E66.01 MORBID OBESITY (H): ICD-10-CM

## 2019-05-30 DIAGNOSIS — L30.9 ECZEMA, UNSPECIFIED TYPE: ICD-10-CM

## 2019-05-30 DIAGNOSIS — Z13.220 SCREENING FOR HYPERLIPIDEMIA: ICD-10-CM

## 2019-05-30 DIAGNOSIS — E78.5 HYPERLIPIDEMIA, UNSPECIFIED HYPERLIPIDEMIA TYPE: ICD-10-CM

## 2019-05-30 DIAGNOSIS — K51.20 ULCERATIVE PROCTITIS WITHOUT COMPLICATION (H): ICD-10-CM

## 2019-05-30 DIAGNOSIS — Z00.00 WELLNESS EXAMINATION: ICD-10-CM

## 2019-05-30 DIAGNOSIS — G89.29 CHRONIC BILATERAL LOW BACK PAIN WITHOUT SCIATICA: Primary | ICD-10-CM

## 2019-05-30 DIAGNOSIS — Z12.5 SCREENING FOR PROSTATE CANCER: ICD-10-CM

## 2019-05-30 DIAGNOSIS — I50.32 CHRONIC DIASTOLIC CONGESTIVE HEART FAILURE (H): ICD-10-CM

## 2019-05-30 DIAGNOSIS — I25.810 CORONARY ARTERY DISEASE INVOLVING CORONARY BYPASS GRAFT OF NATIVE HEART WITHOUT ANGINA PECTORIS: ICD-10-CM

## 2019-05-30 LAB
ERYTHROCYTE [DISTWIDTH] IN BLOOD BY AUTOMATED COUNT: 13.2 % (ref 10–15)
HCT VFR BLD AUTO: 44 % (ref 40–53)
HGB BLD-MCNC: 14.4 G/DL (ref 13.3–17.7)
MCH RBC QN AUTO: 29.6 PG (ref 26.5–33)
MCHC RBC AUTO-ENTMCNC: 32.7 G/DL (ref 31.5–36.5)
MCV RBC AUTO: 91 FL (ref 78–100)
PLATELET # BLD AUTO: 300 10E9/L (ref 150–450)
RBC # BLD AUTO: 4.86 10E12/L (ref 4.4–5.9)
WBC # BLD AUTO: 10.3 10E9/L (ref 4–11)

## 2019-05-30 PROCEDURE — 80061 LIPID PANEL: CPT | Performed by: INTERNAL MEDICINE

## 2019-05-30 PROCEDURE — 36415 COLL VENOUS BLD VENIPUNCTURE: CPT | Performed by: INTERNAL MEDICINE

## 2019-05-30 PROCEDURE — 80053 COMPREHEN METABOLIC PANEL: CPT | Performed by: INTERNAL MEDICINE

## 2019-05-30 PROCEDURE — G0439 PPPS, SUBSEQ VISIT: HCPCS | Performed by: INTERNAL MEDICINE

## 2019-05-30 PROCEDURE — G0103 PSA SCREENING: HCPCS | Performed by: INTERNAL MEDICINE

## 2019-05-30 PROCEDURE — 85027 COMPLETE CBC AUTOMATED: CPT | Performed by: INTERNAL MEDICINE

## 2019-05-30 RX ORDER — FUROSEMIDE 40 MG
20 TABLET ORAL DAILY
Qty: 45 TABLET | Refills: 3 | Status: SHIPPED | OUTPATIENT
Start: 2019-05-30 | End: 2021-07-26

## 2019-05-30 RX ORDER — ROSUVASTATIN CALCIUM 40 MG/1
40 TABLET, COATED ORAL DAILY
Qty: 90 TABLET | Refills: 3 | Status: SHIPPED | OUTPATIENT
Start: 2019-05-30 | End: 2019-07-08

## 2019-05-30 RX ORDER — TAMSULOSIN HYDROCHLORIDE 0.4 MG/1
CAPSULE ORAL
Qty: 90 CAPSULE | Refills: 3 | Status: SHIPPED | OUTPATIENT
Start: 2019-05-30 | End: 2020-07-09

## 2019-05-30 RX ORDER — BETAMETHASONE VALERATE 0.1 %
LOTION (ML) TOPICAL
Qty: 60 ML | Refills: 3 | Status: SHIPPED | OUTPATIENT
Start: 2019-05-30 | End: 2019-07-08

## 2019-05-30 RX ORDER — MESALAMINE 800 MG/1
1 TABLET, DELAYED RELEASE ORAL 2 TIMES DAILY
Qty: 180 TABLET | Refills: 11 | Status: SHIPPED | OUTPATIENT
Start: 2019-05-30 | End: 2020-06-16

## 2019-05-30 RX ORDER — EZETIMIBE 10 MG/1
10 TABLET ORAL DAILY
Qty: 90 TABLET | Refills: 3 | Status: SHIPPED | OUTPATIENT
Start: 2019-05-30 | End: 2019-07-08

## 2019-05-30 RX ORDER — METOPROLOL TARTRATE 25 MG/1
25 TABLET, FILM COATED ORAL 2 TIMES DAILY
Qty: 180 TABLET | Refills: 3 | Status: ON HOLD | OUTPATIENT
Start: 2019-05-30 | End: 2019-11-19

## 2019-05-30 ASSESSMENT — MIFFLIN-ST. JEOR: SCORE: 1666.11

## 2019-05-30 ASSESSMENT — ACTIVITIES OF DAILY LIVING (ADL): CURRENT_FUNCTION: NO ASSISTANCE NEEDED

## 2019-05-30 NOTE — PATIENT INSTRUCTIONS
1) Set up with physical therapy for the back    2) Labs today    3) Refilled medication    4) Let me know if new issues come up.    5) Can try athlete's foot powder (lamisil or lotrimin) on the back area if needed    6) ear wash today    Tu Reyes MD

## 2019-05-30 NOTE — LETTER
[place]lesh   [address]  [place]  [address]  [phone]      May 31, 2019      Fausto Farr                                                                                                              [address]  [place] [address]              Dear Fausto,    Here are the results from the recent Labs that we did.     Your blood sugar was slightly elevated. Continuing with regular exercise and low carbohydrate diet can help this. We will recheck this with routine monitoring. Blood glucoses higher than 126 can mean diabetes, so we will have to follow this to ensure diabetes is not developing.     Your cholesterol is in a good range.     Your prostate testing was normal.     Your hemoglobin was normal.     PSA, screen     Status:  Final result      Ref Range & Units 1d ago 1yr ago 2yr ago   PSA 0 - 4 ug/L <0.01  <0.01 CM <0.01               Lipid panel reflex to direct LDL Fasting     Status:  Final result      Ref Range & Units 1d ago 10mo ago 1yr ago   Cholesterol <200 mg/dL 157  128  119    Triglycerides <150 mg/dL 110  165High  CM 176High  CM   HDL Cholesterol >39 mg/dL 43  36Low   32Low     LDL Cholesterol Calculated <100 mg/dL 92  59 CM 52 CM   Comment: Desirable:       <100 mg/dl   Non HDL Cholesterol <130 mg/dL 114  92  87            Comprehensive metabolic panel     Status:  Final result      Ref Range & Units 1d ago  (5/30/19) 1mo ago  (4/22/19) 10mo ago  (7/12/18) 10mo ago  (7/12/18)   Sodium 133 - 144 mmol/L 140  139  139     Potassium 3.4 - 5.3 mmol/L 4.6  4.1  4.0     Chloride 94 - 109 mmol/L 106  107  106     Carbon Dioxide 20 - 32 mmol/L 28  26  29     Anion Gap 3 - 14 mmol/L 6  6  4     Glucose 70 - 99 mg/dL 106High   120High   111High  CM    Urea Nitrogen 7 - 30 mg/dL 23  17  15     Creatinine 0.66 - 1.25 mg/dL 0.97  0.94  1.01     GFR Estimate >60 mL/min/{1.73_m2} 74  77 CM 72 R, CM    Comment: Non  GFR Calc   Starting 12/18/2018, serum creatinine  based estimated GFR (eGFR) will be   calculated using the Chronic Kidney Disease Epidemiology Collaboration   (CKD-EPI) equation.    GFR Estimate If Black >60 mL/min/{1.73_m2} 86  89 CM 87 R, CM    Comment:  GFR Calc   Starting 12/18/2018, serum creatinine based estimated GFR (eGFR) will be   calculated using the Chronic Kidney Disease Epidemiology Collaboration   (CKD-EPI) equation.    Calcium 8.5 - 10.1 mg/dL 8.4Low   9.4  9.0     Bilirubin Total 0.2 - 1.3 mg/dL 0.3       Albumin 3.4 - 5.0 g/dL 4.1       Protein Total 6.8 - 8.8 g/dL 8.0       Alkaline Phosphatase 40 - 150 U/L 57       ALT 0 - 70 U/L 70    28    AST 0 - 45 U/L 41               CBC with platelets     Status:  Final result      Ref Range & Units 1d ago 1mo ago 1yr ago   WBC 4.0 - 11.0 10e9/L 10.3  7.7  10.1    RBC Count 4.4 - 5.9 10e12/L 4.86  4.58  4.68    Hemoglobin 13.3 - 17.7 g/dL 14.4  13.7  14.0    Hematocrit 40.0 - 53.0 % 44.0  40.9  43.2    MCV 78 - 100 fl 91  89  92    MCH 26.5 - 33.0 pg 29.6  29.9  29.9    MCHC 31.5 - 36.5 g/dL 32.7  33.5  32.4    RDW 10.0 - 15.0 % 13.2  12.9  13.3    Platelet Count 150 - 450 10e9/L 300  267  304                Let me know if you have questions or concerns!     Sincerely,       Tu Reyes MD   Internal Medicine and Pediatrics

## 2019-05-30 NOTE — PROGRESS NOTES
"SUBJECTIVE:   Fausto Farr is a 79 year old male who presents for Preventive Visit.      Are you in the first 12 months of your Medicare coverage?  No    Healthy Habits:    In general, how would you rate your overall health?  Good    Frequency of exercise:  None    Duration of exercise:  Less than 15 minutes    Do you usually eat at least 4 servings of fruit and vegetables a day, include whole grains    & fiber and avoid regularly eating high fat or \"junk\" foods?  Yes    Taking medications regularly:  Yes    Barriers to taking medications:  None    Medication side effects:  None    Ability to successfully perform activities of daily living:  No assistance needed    Home Safety:  No safety concerns identified    Hearing Impairment:  No hearing concerns    In the past 6 months, have you been bothered by leaking of urine?  No    In general, how would you rate your overall mental or emotional health?  Good      PHQ-2 Total Score:    Additional concerns today:  Yes    Do you feel safe in your environment? Yes    Do you have a Health Care Directive? Yes: Advance Directive has been received and scanned.      Was down in [place] - had episode of LH, legs were weak, had difficulty with talking.Was evaluated for stroke at that time- had stress test at that time. Looked ok     S/p atrial flutter ablation, has been feeling well since that time.    Had been having some knee pain and lower back pain- doing better after injection. Went to orthopedics    Using CPAP    No frequent urination.    Fall risk  Fallen 2 or more times in the past year?: No  Any fall with injury in the past year?: No    Cognitive Screening   1) Repeat 3 items (Leader, Season, Table)    2) Clock draw: NORMAL  3) 3 item recall: Recalls 2 objects   Results: NORMAL clock, 1-2 items recalled: COGNITIVE IMPAIRMENT LESS LIKELY    Mini-CogTM Copyright S Mamadou. Licensed by the author for use in [facility]; reprinted with permission (sandi@.[facility]). " All rights reserved.      Do you have sleep apnea, excessive snoring or daytime drowsiness?: uses cpap     Reviewed and updated as needed this visit by clinical staff  Tobacco  Allergies  Med Hx  Surg Hx  Fam Hx  Soc Hx        Reviewed and updated as needed this visit by Provider        Social History     Tobacco Use     Smoking status: Former Smoker     Packs/day: 1.00     Years: 40.00     Pack years: 40.00     Start date: 4/15/1962     Last attempt to quit: 10/23/2002     Years since quittin.6     Smokeless tobacco: Never Used   Substance Use Topics     Alcohol use: Yes     Comment: a couple beers per week (socially)         Alcohol Use 2018   Prescreen: >3 drinks/day or >7 drinks/week? No   Prescreen: >3 drinks/day or >7 drinks/week? -               Current providers sharing in care for this patient include:   Patient Care Team:  Tu Reyes MD as PCP - General (Internal Medicine)  Calderon Santo MD as MD (Cardiology)  Elder Dao, APRN CNP as Nurse Practitioner (Nurse Practitioner)  Walt Garcia MD as MD (Orthopedics)  Tu Reyes MD as Assigned PCP    The following health maintenance items are reviewed in Epic and correct as of today:  Health Maintenance   Topic Date Due     ANNUAL REVIEW OF HM ORDERS  1940     ZOSTER IMMUNIZATION (2 of 3) 2009     PHQ-2  2019     FALL RISK ASSESSMENT  2019     ALT  2019     LIPID  2019     HF ACTION PLAN  2019     BMP  10/22/2019     CBC  2020     MEDICARE ANNUAL WELLNESS VISIT  2020     ADVANCED DIRECTIVE PLANNING  10/09/2020     DTAP/TDAP/TD IMMUNIZATION (4 - Td) 2027     INFLUENZA VACCINE  Completed     IPV IMMUNIZATION  Aged Out     MENINGITIS IMMUNIZATION  Aged Out     Lab work is in process  Pneumonia Vaccine:Adults age 65+ who received Pneumovax (PPSV23) at 65 years or older: Should be given PCV13 > 1 year after their most recent PPSV23    Review of  "Systems  Constitutional, HEENT, cardiovascular, pulmonary, GI, , musculoskeletal, neuro, skin, endocrine and psych systems are negative, except as otherwise noted.    OBJECTIVE:   /62 (BP Location: Right arm, Patient Position: Sitting, Cuff Size: Adult Large)   Pulse 52   Temp 97.4  F (36.3  C) (Oral)   Ht 1.676 m (5' 6\")   Wt 100.3 kg (221 lb 3.2 oz)   SpO2 98%   BMI 35.70 kg/m   Estimated body mass index is 35.7 kg/m  as calculated from the following:    Height as of this encounter: 1.676 m (5' 6\").    Weight as of this encounter: 100.3 kg (221 lb 3.2 oz).  Physical Exam  GENERAL: healthy, alert and no distress  EYES: Eyes grossly normal to inspection, PERRL and conjunctivae and sclerae normal  HENT: ear canals and TM's normal, nose and mouth without ulcers or lesions  NECK: no adenopathy, no asymmetry, masses, or scars and thyroid normal to palpation  RESP: lungs clear to auscultation - no rales, rhonchi or wheezes  CV: regular rate and rhythm, normal S1 S2, no S3 or S4, mechanical click noted but no rub, no peripheral edema and peripheral pulses strong  ABDOMEN: soft, nontender, no hepatosplenomegaly, no masses and bowel sounds normal  MS: no gross musculoskeletal defects noted, no edema  SKIN: no suspicious lesions or rashes  NEURO: Normal strength and tone, mentation intact and speech normal  PSYCH: mentation appears normal, affect normal/bright    Diagnostic Test Results:  Labs reviewed in Epic  Results for orders placed or performed in visit on 05/30/19   CBC with platelets   Result Value Ref Range    WBC 10.3 4.0 - 11.0 10e9/L    RBC Count 4.86 4.4 - 5.9 10e12/L    Hemoglobin 14.4 13.3 - 17.7 g/dL    Hematocrit 44.0 40.0 - 53.0 %    MCV 91 78 - 100 fl    MCH 29.6 26.5 - 33.0 pg    MCHC 32.7 31.5 - 36.5 g/dL    RDW 13.2 10.0 - 15.0 %    Platelet Count 300 150 - 450 10e9/L   Comprehensive metabolic panel   Result Value Ref Range    Sodium 140 133 - 144 mmol/L    Potassium 4.6 3.4 - 5.3 mmol/L    " Chloride 106 94 - 109 mmol/L    Carbon Dioxide 28 20 - 32 mmol/L    Anion Gap 6 3 - 14 mmol/L    Glucose 106 (H) 70 - 99 mg/dL    Urea Nitrogen 23 7 - 30 mg/dL    Creatinine 0.97 0.66 - 1.25 mg/dL    GFR Estimate 74 >60 mL/min/[1.73_m2]    GFR Estimate If Black 86 >60 mL/min/[1.73_m2]    Calcium 8.4 (L) 8.5 - 10.1 mg/dL    Bilirubin Total 0.3 0.2 - 1.3 mg/dL    Albumin 4.1 3.4 - 5.0 g/dL    Protein Total 8.0 6.8 - 8.8 g/dL    Alkaline Phosphatase 57 40 - 150 U/L    ALT 70 0 - 70 U/L    AST 41 0 - 45 U/L   PSA, screen   Result Value Ref Range    PSA <0.01 0 - 4 ug/L   Lipid panel reflex to direct LDL Fasting   Result Value Ref Range    Cholesterol 157 <200 mg/dL    Triglycerides 110 <150 mg/dL    HDL Cholesterol 43 >39 mg/dL    LDL Cholesterol Calculated 92 <100 mg/dL    Non HDL Cholesterol 114 <130 mg/dL       ASSESSMENT / PLAN:       ICD-10-CM    1. Chronic bilateral low back pain without sciatica M54.5 [facility] PT, HAND, AND CHIROPRACTIC REFERRAL    G89.29    2. Morbid obesity (H) E66.01    3. 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 Hyperlipidemia, unspecified hyperlipidemia type E78.5 rosuvastatin (CRESTOR) 40 MG tablet   6. Urinary retention R33.9 tamsulosin (FLOMAX) 0.4 MG capsule   7. Chronic diastolic congestive heart failure (H) I50.32 furosemide (LASIX) 40 MG tablet   8. Mixed hyperlipidemia E78.2 ezetimibe (ZETIA) 10 MG tablet   9. Eczema, unspecified type L30.9 betamethasone valerate (VALISONE) 0.1 % external lotion   10. Screening for prostate cancer Z12.5 PSA, screen   11. Screening for deficiency anemia Z13.0 CBC with platelets   12. Screening for hyperlipidemia Z13.220 Comprehensive metabolic panel     Lipid panel reflex to direct LDL Fasting   13. S/P mitral valve  "replacement Z95.2        End of Life Planning:  Patient currently has an advanced directive: No.  I have verified the patient's ablity to prepare an advanced directive/make health care decisions.  Literature was provided to assist patient in preparing an advanced directive.    COUNSELING:  Reviewed preventive health counseling, as reflected in patient instructions    Estimated body mass index is 35.7 kg/m  as calculated from the following:    Height as of this encounter: 1.676 m (5' 6\").    Weight as of this encounter: 100.3 kg (221 lb 3.2 oz).    Weight management plan: Discussed healthy diet and exercise guidelines     reports that he quit smoking about 16 years ago. He started smoking about 57 years ago. He has a 40.00 pack-year smoking history. He has never used smokeless tobacco.      Appropriate preventive services were discussed with this patient, including applicable screening as appropriate for cardiovascular disease, diabetes, osteopenia/osteoporosis, and glaucoma.  As appropriate for age/gender, discussed screening for colorectal cancer, prostate cancer, breast cancer, and cervical cancer. Checklist reviewing preventive services available has been given to the patient.    Reviewed patients plan of care and provided an AVS. The Basic Care Plan (routine screening as documented in Health Maintenance) for Fausto meets the Care Plan requirement. This Care Plan has been established and reviewed with the Patient.    Counseling Resources:  ATP IV Guidelines  Pooled Cohorts Equation Calculator  Breast Cancer Risk Calculator  FRAX Risk Assessment  ICSI Preventive Guidelines  Dietary Guidelines for Americans, 2010  USDA's MyPlate  ASA Prophylaxis  Lung CA Screening    Tu Reyes MD  [facility]    Identified Health Risks:  "

## 2019-05-31 LAB
ALBUMIN SERPL-MCNC: 4.1 G/DL (ref 3.4–5)
ALP SERPL-CCNC: 57 U/L (ref 40–150)
ALT SERPL W P-5'-P-CCNC: 70 U/L (ref 0–70)
ANION GAP SERPL CALCULATED.3IONS-SCNC: 6 MMOL/L (ref 3–14)
AST SERPL W P-5'-P-CCNC: 41 U/L (ref 0–45)
BILIRUB SERPL-MCNC: 0.3 MG/DL (ref 0.2–1.3)
BUN SERPL-MCNC: 23 MG/DL (ref 7–30)
CALCIUM SERPL-MCNC: 8.4 MG/DL (ref 8.5–10.1)
CHLORIDE SERPL-SCNC: 106 MMOL/L (ref 94–109)
CHOLEST SERPL-MCNC: 157 MG/DL
CO2 SERPL-SCNC: 28 MMOL/L (ref 20–32)
CREAT SERPL-MCNC: 0.97 MG/DL (ref 0.66–1.25)
GFR SERPL CREATININE-BSD FRML MDRD: 74 ML/MIN/{1.73_M2}
GLUCOSE SERPL-MCNC: 106 MG/DL (ref 70–99)
HDLC SERPL-MCNC: 43 MG/DL
LDLC SERPL CALC-MCNC: 92 MG/DL
NONHDLC SERPL-MCNC: 114 MG/DL
POTASSIUM SERPL-SCNC: 4.6 MMOL/L (ref 3.4–5.3)
PROT SERPL-MCNC: 8 G/DL (ref 6.8–8.8)
PSA SERPL-ACNC: <0.01 UG/L (ref 0–4)
SODIUM SERPL-SCNC: 140 MMOL/L (ref 133–144)
TRIGL SERPL-MCNC: 110 MG/DL

## 2019-06-03 DIAGNOSIS — E78.2 MIXED HYPERLIPIDEMIA: ICD-10-CM

## 2019-06-03 RX ORDER — EZETIMIBE 10 MG/1
TABLET ORAL
Qty: 90 TABLET | Refills: 0 | OUTPATIENT
Start: 2019-06-03

## 2019-06-03 NOTE — TELEPHONE ENCOUNTER
Not due for a refill.     ezetimibe (ZETIA) 10 MG tablet was filled on 5/30/2019, qty 90 with 3 refills.

## 2019-06-06 ENCOUNTER — THERAPY VISIT (OUTPATIENT)
Dept: PHYSICAL THERAPY | Facility: CLINIC | Age: 79
End: 2019-06-06
Payer: MEDICARE

## 2019-06-06 DIAGNOSIS — M54.50 ACUTE BILATERAL LOW BACK PAIN WITHOUT SCIATICA: ICD-10-CM

## 2019-06-06 DIAGNOSIS — M54.50 CHRONIC BILATERAL LOW BACK PAIN WITHOUT SCIATICA: ICD-10-CM

## 2019-06-06 DIAGNOSIS — G89.29 CHRONIC BILATERAL LOW BACK PAIN WITHOUT SCIATICA: ICD-10-CM

## 2019-06-06 PROCEDURE — 97161 PT EVAL LOW COMPLEX 20 MIN: CPT | Mod: GP | Performed by: PHYSICAL THERAPIST

## 2019-06-06 PROCEDURE — 97110 THERAPEUTIC EXERCISES: CPT | Mod: GP | Performed by: PHYSICAL THERAPIST

## 2019-06-06 NOTE — LETTER
DEPARTMENT OF HEALTH AND HUMAN SERVICES  CENTERS FOR MEDICARE & MEDICAID SERVICES    PLAN/UPDATED PLAN OF PROGRESS FOR OUTPATIENT REHABILITATION    PATIENTS NAME:  Fausto Farr   : 1940  PROVIDER NUMBER:    [id number]  HICN:  [id number]  PROVIDER NAME: [facility] [employer] [facility]  MEDICAL RECORD NUMBER: [id number]   START OF CARE DATE:  SOC Date: 19   TYPE:  PT  PRIMARY/TREATMENT DIAGNOSIS: (Pertinent Medical Diagnosis)  Chronic bilateral low back pain without sciatica  Acute bilateral low back pain without sciatica  VISITS FROM START OF CARE: 1 Rxs Used: 1     [facility] Initial Evaluation    Subjective:  The history is provided by the patient. No  was used.   Fausto Farr is a 79 year old male with a lumbar condition.  Condition occurred with:  Insidious onset.  Condition occurred: for unknown reasons.  This is a recurrent condition  Pt has had history of low back pain and had pain into bilateral legs. He underwent L4-5 lumbar fusion about 3 years ago. More recently, he has noticed tightness in low back which increases within 5-10 minutes of standing. He can only walk about 5 minutes at a time. He describes pain as aching across low back but denies symptoms into legs. MD orders from 5-30-19. He feels better with sitting and with standing backward bending against tractor. .    Patient reports pain:  Lumbar spine right and lumbar spine left.    Pain is described as aching and is intermittent and reported as 5/10.   Pain is the same all the time.   and relieved by rest.  Since onset symptoms are unchanged.        General health as reported by patient is poor.  Pertinent medical history includes:  Heart problems, cancer, osteoarthritis, high blood pressure and overweight.    Other surgeries include:  Orthopedic surgery and heart surgery (2006 and  bypass, 2012 artificial valves, lumbar fusion).  Current medications:  Cardiac medication,  heparin/coumadin and high blood pressure medication.  Current occupation is Retired.      Barriers include:  None as reported by the patient.  Objective:  Flexibility/Screens:   Lower Extremity:  Decreased left lower extremity flexibility:Hip IR's; Hip ER's; Hip Flexors; Quadriceps and Hamstrings  Decreased right lower extremity flexibility:  Hip IR's; Hip ER's; Hip Flexors; Quadriceps and Hamstrings  Lumbar/SI Evaluation  ROM:    AROM Lumbar:   Flexion:          To mid lower leg  Ext:                    25% pain low back   Side Bend:        Left:     Right:   Rotation:           Left:     Right:   Side Glide:        Left:  Pulling right side, 50%    Right:  75%  Strength: weakness abdominals  PATIENTS NAME:  Fausto Farr   : 1940    Lumbar Myotomes:  normal  Lumbar Palpation:    Tenderness present at Left:    Erector Spinae  Tenderness present at Right: Erector Spinae  Functional Tests:  not assessed  Hip Evaluation  Hip Strength:    Extension:  Left: 4/5  -  Pain:Right: /5  -  Pain:    Abduction:  Left: 3+/5    -   Pain:Right: 3+/5   -   Pain:  Assessment/Plan:    Patient is a 79 year old male with lumbar complaints.    Patient has the following significant findings with corresponding treatment plan.                Diagnosis 1:  Low back pain  Pain -  hot/cold therapy, manual therapy, self management and education  Decreased ROM/flexibility - manual therapy, therapeutic exercise and home program  Decreased strength - therapeutic exercise, therapeutic activities and home program  Decreased function - therapeutic activities and home program  Therapy Evaluation Codes:   1) History comprised of:    Cumulative Therapy Evaluation is: Low complexity.  Previous and current functional limitations:  (See Goal Flow Sheet for this information)    Short term and Long term goals: (See Goal Flow Sheet for this information)   Communication ability:  Patient appears to be able to clearly communicate and understand verbal  "and written communication and follow directions correctly.  Treatment Explanation - The following has been discussed with the patient:   RX ordered/plan of care  Anticipated outcomes  Possible risks and side effects  This patient would benefit from PT intervention to resume normal activities.   Rehab potential is good.  Frequency:  1 X week, once daily  Duration:  for 4 weeks tapering to 2 X a month over 8 weeks  Discharge Plan:  Achieve all LTG.  Independent in home treatment program.  Reach maximal therapeutic benefit.                                PATIENTS NAME:  Fausto Farr   : 1940          Caregiver Signature/Credentials _____________________________ Date ________           Marivel Mckeon, PT   I have reviewed and certified the need for these services and plan of treatment while under my care.          PHYSICIAN'S SIGNATURE:   _____________________________________ Date___________                          Tu Reyes MD    Certification period:  Beginning of Cert date period: 19 to  End of Cert period date: 08/15/19   Functional Level Progress Report: Please see attached \"Goal Flow sheet for Functional level.\"  ____X____ Continue Services or       ________ DC Services              Service dates: From  SOC Date: 19 date to present                         "

## 2019-06-06 NOTE — PROGRESS NOTES
[facility] Initial Evaluation  Subjective:  The history is provided by the patient. No  was used.   Fausto Farr is a 79 year old male with a lumbar condition.  Condition occurred with:  Insidious onset.  Condition occurred: for unknown reasons.  This is a recurrent condition  Pt has had history of low back pain and had pain into bilateral legs. He underwent L4-5 lumbar fusion about 3 years ago. More recently, he has noticed tightness in low back which increases within 5-10 minutes of standing. He can only walk about 5 minutes at a time. He describes pain as aching across low back but denies symptoms into legs. MD orders from 5-30-19. He feels better with sitting and with standing backward bending against tractor. .    Patient reports pain:  Lumbar spine right and lumbar spine left.    Pain is described as aching and is intermittent and reported as 5/10.   Pain is the same all the time.   and relieved by rest.  Since onset symptoms are unchanged.        General health as reported by patient is poor.  Pertinent medical history includes:  Heart problems, cancer, osteoarthritis, high blood pressure and overweight.    Other surgeries include:  Orthopedic surgery and heart surgery (2006 and  bypass, 2012 artificial valves, lumbar fusion).  Current medications:  Cardiac medication, heparin/coumadin and high blood pressure medication.  Current occupation is Retired.        Barriers include:  None as reported by the patient.                            Objective:        Flexibility/Screens:       Lower Extremity:  Decreased left lower extremity flexibility:Hip IR's; Hip ER's; Hip Flexors; Quadriceps and Hamstrings    Decreased right lower extremity flexibility:  Hip IR's; Hip ER's; Hip Flexors; Quadriceps and Hamstrings               Lumbar/SI Evaluation  ROM:    AROM Lumbar:   Flexion:          To mid lower leg  Ext:                    25% pain low back   Side Bend:        Left:      Right:   Rotation:           Left:     Right:   Side Glide:        Left:  Pulling right side, 50%    Right:  75%        Strength: weakness abdominals  Lumbar Myotomes:  normal                    Lumbar Palpation:    Tenderness present at Left:    Erector Spinae  Tenderness present at Right: Erector Spinae  Functional Tests:  not assessed                                              Hip Evaluation    Hip Strength:      Extension:  Left: 4/5  -  Pain:Right: /5  -  Pain:    Abduction:  Left: 3+/5    -   Pain:Right: 3+/5   -   Pain:                                 General     ROS    Assessment/Plan:    Patient is a 79 year old male with lumbar complaints.    Patient has the following significant findings with corresponding treatment plan.                Diagnosis 1:  Low back pain  Pain -  hot/cold therapy, manual therapy, self management and education  Decreased ROM/flexibility - manual therapy, therapeutic exercise and home program  Decreased strength - therapeutic exercise, therapeutic activities and home program  Decreased function - therapeutic activities and home program    Therapy Evaluation Codes:   1) History comprised of:    Cumulative Therapy Evaluation is: Low complexity.    Previous and current functional limitations:  (See Goal Flow Sheet for this information)    Short term and Long term goals: (See Goal Flow Sheet for this information)     Communication ability:  Patient appears to be able to clearly communicate and understand verbal and written communication and follow directions correctly.  Treatment Explanation - The following has been discussed with the patient:   RX ordered/plan of care  Anticipated outcomes  Possible risks and side effects  This patient would benefit from PT intervention to resume normal activities.   Rehab potential is good.    Frequency:  1 X week, once daily  Duration:  for 4 weeks tapering to 2 X a month over 8 weeks  Discharge Plan:  Achieve all LTG.  Independent in home  treatment program.  Reach maximal therapeutic benefit.    Please refer to the daily flowsheet for treatment today, total treatment time and time spent performing 1:1 timed codes.

## 2019-06-10 ENCOUNTER — HOSPITAL ENCOUNTER (INPATIENT)
Facility: CLINIC | Age: 79
Setting detail: SURGERY ADMIT
End: 2019-06-10
Attending: ORTHOPAEDIC SURGERY | Admitting: ORTHOPAEDIC SURGERY
Payer: MEDICARE

## 2019-06-11 ENCOUNTER — TELEPHONE (OUTPATIENT)
Dept: CARDIOLOGY | Facility: CLINIC | Age: 79
End: 2019-06-11

## 2019-06-11 NOTE — TELEPHONE ENCOUNTER
Pt called in he is to have knee replacement July 15. Pt had typical atrial flutter ablation with arrhythmia termination and bidirectional block on 4/15/19. Anticoagulation will be continued indefinitely given the 2 mechanical valves.  Ortho is asking what kind of bridging Pt will need for knee surgery. Pt does have OV to see DR Santo 7/8/19, and he had Laura 4/15/19. This is informational for DR Santo. MAHSA Rich RN

## 2019-06-13 NOTE — TELEPHONE ENCOUNTER
He will need to be admitted to hospital for iv heparin when his inr drops below 2.0-2.5  Then surgery and will need iv heparin and coumadin post surgery  He has 2 metal valve and question of afib  He is hi risk for clot

## 2019-06-14 NOTE — TELEPHONE ENCOUNTER
Dr. Reyes-  Please see note from Dr. Santo (cardiology).    Patient is having arthroplasty right knee on 7/15/19.  He has 2 mechanical valves and atrial fib and is on warfarin.    Spoke to Odalys by phone.    Lovenox has not been approved for bridging in a patient with 2 mechanical valves and Dr. Snato would not recommend it.    Bridging will need to be set up and determined by you.    Caryn Campos RN  [facility]

## 2019-06-17 ENCOUNTER — TELEPHONE (OUTPATIENT)
Dept: PEDIATRICS | Facility: CLINIC | Age: 79
End: 2019-06-17

## 2019-06-17 NOTE — TELEPHONE ENCOUNTER
Dr. Reyes,  Please contact this patient and give him specific instructions on how he should be going about getting admitted to the hospital on 7/20/19 to start his IV heparin bridging. I don't know what he has to do to get this started. Cardiology and ortho are leaving it up to you.    This is the protocol from the [facility]:  We can't order heparin drips.      Estimated Creatinine Clearance: 69.6 mL/min (based on SCr of 0.97 mg/dL).    Caryn Campos RN  [facility]

## 2019-06-17 NOTE — TELEPHONE ENCOUNTER
Called and left a message on voicemail that I will be forwarding this message to Dr. Reyes.    Caryn Campos, JUSTIN  [facility]

## 2019-06-17 NOTE — TELEPHONE ENCOUNTER
Patient will need to be bridged on heparin as noted by cardiology below. Please let Dr. Jensen's team (at [facility] orthopedics) know about this and will need to prep for this on preadmission, he can let us know if he should go to medicine for admission before surgery.

## 2019-06-17 NOTE — TELEPHONE ENCOUNTER
Reason for Call:  Other call back    Detailed comments: The pt was calling and he would like to speak to his care team about how to bridge his inr before his upcoming knee surgery.       Phone Number Patient can be reached at: Home number on file [phone] (home)    Best Time: Anytime    Can we leave a detailed message on this number? YES    Call taken on 6/17/2019 at 7:26 AM by Robyn Singh

## 2019-06-17 NOTE — TELEPHONE ENCOUNTER
Again spoke with Pt and daughter and explain heparin  Vs Lovenox and the reason for Heparin being two mechanical valves, and afib which leaves him high risk for clot, and Lovenox not approved for valves. MAHSA Rich RN

## 2019-06-20 ENCOUNTER — THERAPY VISIT (OUTPATIENT)
Dept: PHYSICAL THERAPY | Facility: CLINIC | Age: 79
End: 2019-06-20
Payer: MEDICARE

## 2019-06-20 DIAGNOSIS — M54.50 ACUTE BILATERAL LOW BACK PAIN WITHOUT SCIATICA: ICD-10-CM

## 2019-06-20 PROCEDURE — 97110 THERAPEUTIC EXERCISES: CPT | Mod: GP | Performed by: PHYSICAL THERAPIST

## 2019-06-25 ENCOUNTER — ALLIED HEALTH/NURSE VISIT (OUTPATIENT)
Dept: NURSING | Facility: CLINIC | Age: 79
End: 2019-06-25
Payer: MEDICARE

## 2019-06-25 ENCOUNTER — ANTICOAGULATION THERAPY VISIT (OUTPATIENT)
Dept: NURSING | Facility: CLINIC | Age: 79
End: 2019-06-25
Payer: MEDICARE

## 2019-06-25 VITALS — DIASTOLIC BLOOD PRESSURE: 70 MMHG | SYSTOLIC BLOOD PRESSURE: 110 MMHG

## 2019-06-25 DIAGNOSIS — I10 BENIGN ESSENTIAL HYPERTENSION: Primary | ICD-10-CM

## 2019-06-25 DIAGNOSIS — Z79.01 LONG TERM CURRENT USE OF ANTICOAGULANT THERAPY: Primary | ICD-10-CM

## 2019-06-25 DIAGNOSIS — Z95.2 S/P MITRAL VALVE REPLACEMENT: ICD-10-CM

## 2019-06-25 DIAGNOSIS — I48.91 AF (ATRIAL FIBRILLATION) (H): ICD-10-CM

## 2019-06-25 LAB — INR POINT OF CARE: 3.5 (ref 0.86–1.14)

## 2019-06-25 PROCEDURE — 99207 ZZC NO CHARGE NURSE ONLY: CPT

## 2019-06-25 PROCEDURE — 85610 PROTHROMBIN TIME: CPT | Mod: QW

## 2019-06-25 PROCEDURE — 36416 COLLJ CAPILLARY BLOOD SPEC: CPT

## 2019-06-25 NOTE — PROGRESS NOTES
ANTICOAGULATION FOLLOW-UP CLINIC VISIT    Patient Name:  Fausto Farr  Date:  6/25/2019  Contact Type:  Face to Face    SUBJECTIVE:  Patient Findings     Positives:   Upcoming invasive procedure    Comments:    Patient is having right knee surgery on 7/22/19.   He will need to be hospitalized and   receive IV heparin 2 days before his surgery.   He was told his INR should be down to   2.5 before he starts the heparin.  Maintenance dose was decreased.   INRs have been at the top of his range.        Clinical Outcomes     Comments:    Patient is having right knee surgery on 7/22/19.   He will need to be hospitalized and   receive IV heparin 2 days before his surgery.   He was told his INR should be down to   2.5 before he starts the heparin.  Maintenance dose was decreased.   INRs have been at the top of his range.           OBJECTIVE    INR Protime   Date Value Ref Range Status   06/25/2019 3.5 (A) 0.86 - 1.14 Final       ASSESSMENT / PLAN  INR assessment THER    Recheck INR In: 4 WEEKS    INR Location Clinic      Anticoagulation Summary  As of 6/25/2019    INR goal:   2.5-3.5   TTR:   56.5 % (3.1 y)   INR used for dosing:   3.5 (6/25/2019)   Warfarin maintenance plan:   5 mg (5 mg x 1) every Mon, Wed, Fri; 7.5 mg (5 mg x 1.5) all other days   Full warfarin instructions:   5 mg every Mon, Wed, Fri; 7.5 mg all other days   Weekly warfarin total:   45 mg   Plan last modified:   Caryn Campos RN (6/25/2019)   Next INR check:   7/18/2019   Priority:   INR   Target end date:   Indefinite    Indications    AF (atrial fibrillation) (H) [I48.91]  S/P mitral valve replacement [Z95.2]  Long term current use of anticoagulant therapy [Z79.01]             Anticoagulation Episode Summary     INR check location:       Preferred lab:       Send INR reminders to:   SHANNA DOWELL    Comments:   5mg tabs - andrade dose // transfer from [facility] // FAB BAG [place] // CALENDAR      Anticoagulation Care Providers     Provider  Role Specialty Phone number    Tu Reyes MD  Internal Medicine [phone]            See the Encounter Report to view Anticoagulation Flowsheet and Dosing Calendar (Go to Encounters tab in chart review, and find the Anticoagulation Therapy Visit)    INR is therapeutic today. Patient is having right knee surgery on 7/22/19. He will need to be hospitalized and receive IV heparin 2 days before his surgery. He was told his INR should be down to 2.5 before he starts the heparin. Maintenance dose was decreased by 5.3%. INRs have been at the top of his range of 2.5-3.5.   Follow up in 4 weeks or sooner if needed.        Caryn Campos RN

## 2019-06-26 NOTE — TELEPHONE ENCOUNTER
Called pt at [phone], not available, so LM to call us back.    Need to notify pt as per 's notes below.    Nikolay, RN  Triage Nurse

## 2019-06-26 NOTE — TELEPHONE ENCOUNTER
Patient called back & was read the message below from Dr Reyes. Patient did not have questions besides what time he would be admitted or where he would be admitted. Patient was advised we would call him back when it was arranged.

## 2019-06-26 NOTE — TELEPHONE ENCOUNTER
Please let patient know that he will need direct admission 2 days before for heparin drip before his surgery. We will arrange for this on 7/20 to be admitted and should call him with updates on 7/18 for where to go etc.    Tu Reyes MD

## 2019-06-27 ENCOUNTER — HOSPITAL ENCOUNTER (OUTPATIENT)
Dept: LAB | Facility: CLINIC | Age: 79
Discharge: HOME | End: 2019-06-27
Attending: ORTHOPAEDIC SURGERY | Admitting: ORTHOPAEDIC SURGERY
Payer: MEDICARE

## 2019-06-27 DIAGNOSIS — Z01.812 PRE-OPERATIVE LABORATORY EXAMINATION: ICD-10-CM

## 2019-06-27 PROBLEM — M54.50 ACUTE BILATERAL LOW BACK PAIN WITHOUT SCIATICA: Status: RESOLVED | Noted: 2019-06-06 | Resolved: 2019-06-27

## 2019-06-27 LAB
MRSA DNA SPEC QL NAA+PROBE: POSITIVE
SPECIMEN SOURCE: ABNORMAL

## 2019-06-27 PROCEDURE — 87640 STAPH A DNA AMP PROBE: CPT | Performed by: ORTHOPAEDIC SURGERY

## 2019-06-27 PROCEDURE — 87641 MR-STAPH DNA AMP PROBE: CPT | Performed by: ORTHOPAEDIC SURGERY

## 2019-06-27 PROCEDURE — 40000829 ZZHCL STATISTIC STAPH AUREUS SUSCEPT SCREEN PCR: Performed by: ORTHOPAEDIC SURGERY

## 2019-06-27 PROCEDURE — 40000830 ZZHCL STATISTIC STAPH AUREUS METH RESIST SCREEN PCR: Performed by: ORTHOPAEDIC SURGERY

## 2019-06-27 NOTE — PROGRESS NOTES
DISCHARGE REPORT    Progress reporting period is from 6-6-19 to 6-20-19.       SUBJECTIVE    Subjective: Pt reports pain 8-10/10 with walking 5 minutes but this is also impacted by right knee pain. He is scheduled for right knee replacement. He gets some relief with standing extension exercise. He will postpone PT for back until after knee surgery.      Current Pain level: 3/10.     Previous pain level was  5/10  .   Changes in function:  None  Adverse reaction to treatment or activity: None    OBJECTIVE  Changes noted in objective findings:  The objective findings below are from DOS 6-20-19.  Objective: Lumbar AROM FB to mid lower leg pulling in right thigh, BB 25%      ASSESSMENT/PLAN  Updated problem list and treatment plan: Diagnosis 1:  Low back pain  Pain -  home program  Decreased ROM/flexibility - therapeutic exercise and home program  Decreased strength - therapeutic exercise, therapeutic activities and home program  Impaired posture - neuro re-education and therapeutic activities  STG/LTGs have been met or progress has been made towards goals:  None  Assessment of Progress: The patient's condition has potential to improve.  Pt would like to discontinue PT at this time. He is scheduled for Knee replacement surgery in July.  Will resume PT for low back some time after surgery.  Self Management Plans:  Patient has been instructed in a home treatment program.  I have re-evaluated this patient and find that the nature, scope, duration and intensity of the therapy is appropriate for the medical condition of the patient.  Fausto continues to require the following intervention to meet STG and LTG's:  Pt would benefit from continued PT but will be discharged at this time.    Recommendations:  This patient is ready to be discharged from therapy and continue their home treatment program.    Please refer to the daily flowsheet for treatment today, total treatment time and time spent performing 1:1 timed codes.

## 2019-06-28 RX ORDER — MUPIROCIN 20 MG/G
OINTMENT TOPICAL 2 TIMES DAILY
Status: ON HOLD | COMMUNITY
End: 2019-07-20 | Stop reason: HOSPADM

## 2019-06-28 NOTE — PLAN OF CARE
Nasal screen results MRSA positive, MSSA positive. Dr. Jensen's office notified. Patient notified of results, extra showering and instructions given on Mupiricin ointment, denies questions. Patient also informed of isolation protocol. Mupiricin rx called into pharmacy of choice. Vancomycin and contact isolation ordered.

## 2019-07-08 ENCOUNTER — OFFICE VISIT (OUTPATIENT)
Dept: CARDIOLOGY | Facility: CLINIC | Age: 79
End: 2019-07-08
Attending: PHYSICIAN ASSISTANT
Payer: MEDICARE

## 2019-07-08 ENCOUNTER — OFFICE VISIT (OUTPATIENT)
Dept: PEDIATRICS | Facility: CLINIC | Age: 79
End: 2019-07-08
Payer: MEDICARE

## 2019-07-08 VITALS
OXYGEN SATURATION: 95 % | WEIGHT: 225.9 LBS | TEMPERATURE: 97.9 F | SYSTOLIC BLOOD PRESSURE: 124 MMHG | HEIGHT: 66 IN | HEART RATE: 60 BPM | BODY MASS INDEX: 36.31 KG/M2 | DIASTOLIC BLOOD PRESSURE: 54 MMHG

## 2019-07-08 VITALS
HEIGHT: 66 IN | BODY MASS INDEX: 36.32 KG/M2 | OXYGEN SATURATION: 97 % | DIASTOLIC BLOOD PRESSURE: 58 MMHG | HEART RATE: 51 BPM | WEIGHT: 226 LBS | SYSTOLIC BLOOD PRESSURE: 126 MMHG

## 2019-07-08 DIAGNOSIS — E78.5 HYPERLIPIDEMIA LDL GOAL <100: ICD-10-CM

## 2019-07-08 DIAGNOSIS — I48.0 PAROXYSMAL ATRIAL FIBRILLATION (H): ICD-10-CM

## 2019-07-08 DIAGNOSIS — E78.5 HYPERLIPIDEMIA, UNSPECIFIED HYPERLIPIDEMIA TYPE: ICD-10-CM

## 2019-07-08 DIAGNOSIS — Z01.818 PREOP GENERAL PHYSICAL EXAM: Primary | ICD-10-CM

## 2019-07-08 DIAGNOSIS — I10 ESSENTIAL HYPERTENSION, BENIGN: ICD-10-CM

## 2019-07-08 DIAGNOSIS — I71.40 ABDOMINAL AORTIC ANEURYSM (AAA) WITHOUT RUPTURE (H): ICD-10-CM

## 2019-07-08 DIAGNOSIS — E78.2 MIXED HYPERLIPIDEMIA: ICD-10-CM

## 2019-07-08 DIAGNOSIS — I50.22 CHRONIC SYSTOLIC CONGESTIVE HEART FAILURE (H): ICD-10-CM

## 2019-07-08 DIAGNOSIS — C61 MALIGNANT NEOPLASM OF PROSTATE (H): ICD-10-CM

## 2019-07-08 DIAGNOSIS — I38 VALVULAR HEART DISEASE: ICD-10-CM

## 2019-07-08 DIAGNOSIS — G47.33 OSA (OBSTRUCTIVE SLEEP APNEA): ICD-10-CM

## 2019-07-08 DIAGNOSIS — R21 RASH: ICD-10-CM

## 2019-07-08 DIAGNOSIS — I25.810 CORONARY ARTERY DISEASE INVOLVING CORONARY BYPASS GRAFT OF NATIVE HEART WITHOUT ANGINA PECTORIS: ICD-10-CM

## 2019-07-08 DIAGNOSIS — Z95.2 S/P MITRAL VALVE REPLACEMENT: ICD-10-CM

## 2019-07-08 DIAGNOSIS — Z95.1 S/P CABG (CORONARY ARTERY BYPASS GRAFT): ICD-10-CM

## 2019-07-08 DIAGNOSIS — M17.11 PRIMARY OSTEOARTHRITIS OF RIGHT KNEE: ICD-10-CM

## 2019-07-08 DIAGNOSIS — Z95.2 S/P AORTIC VALVE REPLACEMENT: ICD-10-CM

## 2019-07-08 DIAGNOSIS — I48.3 TYPICAL ATRIAL FLUTTER (H): ICD-10-CM

## 2019-07-08 PROCEDURE — 99214 OFFICE O/P EST MOD 30 MIN: CPT | Performed by: INTERNAL MEDICINE

## 2019-07-08 PROCEDURE — 99215 OFFICE O/P EST HI 40 MIN: CPT | Performed by: INTERNAL MEDICINE

## 2019-07-08 RX ORDER — ROSUVASTATIN CALCIUM 40 MG/1
40 TABLET, COATED ORAL DAILY
Qty: 90 TABLET | Refills: 3 | Status: SHIPPED | OUTPATIENT
Start: 2019-07-08 | End: 2020-06-16

## 2019-07-08 RX ORDER — FLUOCINONIDE 0.5 MG/G
OINTMENT TOPICAL 2 TIMES DAILY
Qty: 60 G | Refills: 2 | Status: SHIPPED | OUTPATIENT
Start: 2019-07-08 | End: 2019-08-28

## 2019-07-08 RX ORDER — EZETIMIBE 10 MG/1
10 TABLET ORAL DAILY
Qty: 90 TABLET | Refills: 3 | Status: SHIPPED | OUTPATIENT
Start: 2019-07-08 | End: 2020-06-16

## 2019-07-08 ASSESSMENT — MIFFLIN-ST. JEOR
SCORE: 1687.43
SCORE: 1687.88

## 2019-07-08 NOTE — LETTER
7/8/2019    Tu Reyes MD  [address] [place] [address]    RE: Fausto Farr       Dear Colleague,    I had the pleasure of seeing Fausto Farr in the [facility].    HPI and Plan:   See dictation    No orders of the defined types were placed in this encounter.    No orders of the defined types were placed in this encounter.    There are no discontinued medications.      Encounter Diagnoses   Name Primary?     Hyperlipidemia LDL goal <100      Essential hypertension, benign      Abdominal aortic aneurysm (AAA) without rupture (H)      Paroxysmal atrial fibrillation (H)      S/P mitral valve replacement      S/P CABG (coronary artery bypass graft)      S/P aortic valve replacement        CURRENT MEDICATIONS:  Current Outpatient Medications   Medication Sig Dispense Refill     aspirin 81 MG EC tablet Take 1 tablet (81 mg) by mouth daily 1 tablet 0     betamethasone valerate (VALISONE) 0.1 % external lotion APPLY TOPICALLY TWICE DAILY PRN 60 mL 3     ezetimibe (ZETIA) 10 MG tablet Take 1 tablet (10 mg) by mouth daily 90 tablet 3     furosemide (LASIX) 40 MG tablet Take 0.5 tablets (20 mg) by mouth daily 45 tablet 3     HERBALS White willow bark, Turmeric, Alb[place], botswellia & Yucca mixed powder(1.5 tsp mixed in cranberry juice or orange juice) every day 60 each 11     mesalamine (ASACOL HD) 800 MG EC tablet Take 1 tablet (800 mg) by mouth 2 times daily 180 tablet 11     metoprolol tartrate (LOPRESSOR) 25 MG tablet Take 1 tablet (25 mg) by mouth 2 times daily 180 tablet 3     mupirocin (BACTROBAN) 2 % external ointment Apply topically 2 times daily Apply small amount in each nostril twice daily for seven days prior to surgery       rosuvastatin (CRESTOR) 40 MG tablet Take 1 tablet (40 mg) by mouth daily 90 tablet 3     tamsulosin (FLOMAX) 0.4 MG capsule TAKE 1 CAPSULE BY MOUTH EVERY DAY 90 capsule 3     warfarin (COUMADIN) 5 MG tablet Take 5 mg (1 tablet)  every Mon, Fri; 7.5 mg (1.5 tablets) all other days or as directed by INR Clinic 135 tablet 3       ALLERGIES     Allergies   Allergen Reactions     Bees Anaphylaxis       PAST MEDICAL HISTORY:  Past Medical History:   Diagnosis Date     Abdominal pain      Abnormal ECG     RBBB, 1st degree AVB, Left axis deviation     Anemia     currently taking iron     Arrhythmia     pac, pvc     Back pain     since 1980     BPH (benign prostatic hyperplasia)      Bruit      CAD (coronary artery disease)      Cellulitis 10/18/12     Cellulitis 05/2018    GrpB strep LLE cellulitis  negative RACHAEL for veg     Chronic venous insufficiency     bilat lower extremities     Contact dermatitis and other eczema, due to unspecified cause      Diaphragmatic hernia without mention of obstruction or gangrene      Diastolic HF (heart failure) (H)      Gastric ulcer      Glucose intolerance (impaired glucose tolerance)      Heart murmur 9/16/13    valvular heart disease     Hyperlipidemia LDL goal <100 8/6/2013     Hypertension 8/6/13     Lumbago      Malaise and fatigue      Metabolic syndrome      Mobitz (type) I (Wenckebach's) atrioventricular block     and RBBB     Nocturia 10/18/12     Nonallopathic lesion of cervical region      Nonallopathic lesion of lumbar region      Nonallopathic lesion of pelvic region, not elsewhere classified      Nonallopathic lesion of rib cage      Nonallopathic lesion of sacral region      GAGE (obstructive sleep apnea)     CPAP     Paroxysmal atrial fibrillation (H) 10/18/12     Prostate cancer (H) 2008    radiation seed, XRT      PVD (peripheral vascular disease) (H)      RBBB     1st degree AVB, RBBB, LAHB     Rotator cuff strain     and sprain     S/P AAA repair      S/P aortic valve replacement 2006    developed perivalve leak and MS, therefore redo surg 2013     S/P CABG (coronary artery bypass graft) 2006    Lima-Lad, RA-Rca     Sciatica of left side     since 2000     Sepsis due to group B Streptococcus  (H) 5/19/2018     Ulcerative colitis (H)      Varicose veins of bilateral lower extremities with other complications     s/p RLE vein stripping     Vitamin D deficiency        PAST SURGICAL HISTORY:  Past Surgical History:   Procedure Laterality Date     AORTIC VALVE REPLACEMENT  1/3/06    redo AVR SJM 21mm and SJM MVR 27[facility], SJM 27 :MVR-     ARTHROPLASTY KNEE      right knee     BACK SURGERY  Oct 2015    Fusion L4-5, laminectomy L2, L3     BYPASS GRAFT ARTERY CORONARY  10/2013    reimplantation of radial artery graft to RCA     C CABG, VEIN, TWO  1/3/06    Left radial to RCA, LIMA to LAD (RA to RCA reimplanted at time of 2013 surg)     CARDIAC CATHERIZATION  11/2005    Stent placed to RCA     CARDIAC CATHERIZATION  04/2013    Occluded RCA, patent LIMA to LAD and radial graft to PDA     CARPAL TUNNEL RELEASE RT/LT  1994     COLONOSCOPY  8-22-11     CYSTOSCOPY FLEXIBLE  10/16/2013    Procedure: CYSTOSCOPY FLEXIBLE;  FLEXIBLE CYSTOSCOPY / DILATION OF URETHRA / INSERTION OF LESLIE;  Surgeon: Cooper Wallace MD;  Location:  OR     ENDOVASCULAR REPAIR, INFRARENAL ABDOMINAL AORTIC ANEURYSM/DISSECTION; MODULAR BIFURCATED PROSTHESIS  2006    AAA repair endovascular     ENT SURGERY       EP ABLATION ATRIAL FLUTTER N/A 4/22/2019    Procedure: EP Ablation Atrial Flutter;  Surgeon: Jessy Vasquez MD;  Location:  HEART CARDIAC CATH LAB     GENITOURINARY SURGERY  6/16/08    radioactive seeding     HEAD & NECK SURGERY  1997    vocal cord polypectomy     KNEE SURGERY  2001 Right knee arthroscopy     OPTICAL TRACKING SYSTEM FUSION SPINE POSTERIOR LUMBAR THREE+ LEVELS N/A 10/29/2015    Procedure: OPTICAL TRACKING SYSTEM FUSION SPINE POSTERIOR LUMBAR THREE+ LEVELS;  Surgeon: Walt Garcia MD;  Location:  OR     PROSTATE SURGERY      radioactive seeding 6/16/08     REPAIR ANEURYSM ABDOMINAL AORTA  06/08     REPAIR VALVE MITRAL  10/16/2013    [facility] 21():AVR, SJM 27 MJ  501:MVRProcedure: REPAIR VALVE MITRAL;  REDO STERNOTOMY/REDO AORTIC VALVE REPLACEMENT/ MITRAL VALVE REPLACEMENT/REIMPLANTATION OF RIGHT CORONARY ARTERY BYPASS WITH RACHAEL ( ON PUMP);  Surgeon: Viet Singh MD;  Location:  OR     REPLACE VALVE AORTIC  10/16/2013    Procedure: REPLACE VALVE AORTIC;;  Surgeon: Viet Singh MD;  Location:  OR     SURGERY GENERAL IP CONSULT  2008 Excision aneurysm abdominal aorta     SURGERY GENERAL IP CONSULT   Vocal cord polypectomy     VASCULAR SURGERY  1993     varicose vein stripping       FAMILY HISTORY:  Family History   Problem Relation Age of Onset     Coronary Artery Disease Father         CABG     Heart Disease Father         Pacemaker     Other Cancer Daughter      Heart Disease Brother      Other - See Comments Grandchild        SOCIAL HISTORY:  Social History     Socioeconomic History     Marital status:      Spouse name: None     Number of children: None     Years of education: None     Highest education level: None   Occupational History     None   Social Needs     Financial resource strain: None     Food insecurity:     Worry: None     Inability: None     Transportation needs:     Medical: None     Non-medical: None   Tobacco Use     Smoking status: Former Smoker     Packs/day: 1.00     Years: 40.00     Pack years: 40.00     Start date: 4/15/1962     Last attempt to quit: 10/23/2002     Years since quittin.7     Smokeless tobacco: Never Used   Substance and Sexual Activity     Alcohol use: Yes     Comment: a couple beers per week (socially)     Drug use: No     Sexual activity: Never   Lifestyle     Physical activity:     Days per week: None     Minutes per session: None     Stress: None   Relationships     Social connections:     Talks on phone: None     Gets together: None     Attends Orthodoxy service: None     Active member of club or organization: None     Attends meetings of clubs or organizations: None      "Relationship status: None     Intimate partner violence:     Fear of current or ex partner: None     Emotionally abused: None     Physically abused: None     Forced sexual activity: None   Other Topics Concern     Parent/sibling w/ CABG, MI or angioplasty before 65F 55M? Yes     Comment: Brother had bypass at 55      Service Not Asked     Blood Transfusions Not Asked     Caffeine Concern No     Comment: 6-8 cups of half and half per day     Occupational Exposure Not Asked     Hobby Hazards Not Asked     Sleep Concern Not Asked     Stress Concern Not Asked     Weight Concern Not Asked     Special Diet No     Back Care Not Asked     Exercise No     Bike Helmet Not Asked     Seat Belt Not Asked     Self-Exams Not Asked   Social History Narrative     None       Review of Systems:  Skin:  Negative     Eyes:  Positive for glasses  ENT:  Positive for hearing loss  Respiratory:  Positive for dyspnea on exertion;sleep apnea;CPAP  Cardiovascular:    chest pain;Positive for  Gastroenterology: Positive for reflux  Genitourinary:  Negative    Musculoskeletal:  Positive for back pain;joint pain;arthritis  Neurologic:  Positive for headaches  Psychiatric:  Negative    Heme/Lymph/Imm:  Negative    Endocrine:  Negative      Physical Exam:  Vitals: /58 (BP Location: Right arm, Patient Position: Sitting, Cuff Size: Adult Large)   Pulse 51   Ht 1.676 m (5' 6\")   Wt 102.5 kg (226 lb)   SpO2 97%   BMI 36.48 kg/m       Constitutional:           Skin:             Head:           Eyes:           Lymph:      ENT:           Neck:           Respiratory:            Cardiac:                                                           GI:           Extremities and Muscular Skeletal:                 Neurological:           Psych:         Recent Lab Results:  LIPID RESULTS:  Lab Results   Component Value Date    CHOL 157 05/30/2019    HDL 43 05/30/2019    LDL 92 05/30/2019    TRIG 110 05/30/2019    CHOLHDLRATIO 3.6 06/03/2015 "       LIVER ENZYME RESULTS:  Lab Results   Component Value Date    AST 41 05/30/2019    ALT 70 05/30/2019       CBC RESULTS:  Lab Results   Component Value Date    WBC 10.3 05/30/2019    RBC 4.86 05/30/2019    HGB 14.4 05/30/2019    HCT 44.0 05/30/2019    MCV 91 05/30/2019    MCH 29.6 05/30/2019    MCHC 32.7 05/30/2019    RDW 13.2 05/30/2019     05/30/2019       BMP RESULTS:  Lab Results   Component Value Date     05/30/2019    POTASSIUM 4.6 05/30/2019    CHLORIDE 106 05/30/2019    CO2 28 05/30/2019    ANIONGAP 6 05/30/2019     (H) 05/30/2019    BUN 23 05/30/2019    CR 0.97 05/30/2019    GFRESTIMATED 74 05/30/2019    GFRESTBLACK 86 05/30/2019    AWILDA 8.4 (L) 05/30/2019        A1C RESULTS:  Lab Results   Component Value Date    A1C 5.9 04/25/2017       INR RESULTS:  Lab Results   Component Value Date    INR 3.5 (A) 06/25/2019    INR 3.3 (A) 05/21/2019    INR 2.63 (H) 04/15/2019    INR 5.9 07/30/2018           CC  Warren Scales PA-C  [address] [place] [address]    Thank you for allowing me to participate in the care of your patient.      Sincerely,     Calderon Santo MD     [facility]    cc:   Warren Scales PA-C  [address] [place] [address]

## 2019-07-08 NOTE — LETTER
7/8/2019      Tu Reyes MD  [address] [place] [address]      RE: Fausto Carson Yordan       Dear Colleague,    I had the pleasure of seeing Fausto Farr in the [facility].    Service Date: 07/08/2019      HISTORY OF PRESENT ILLNESS:  This is a brief note regarding Fausto Farr, who is scheduled for knee replacement surgery with Dr. Jensen on 07/22 at [facility].  This patient has an extensive cardiac history.  We have outlined it before, but to hit the highlights especially for the anesthesiologist, he has a history of atrial flutter status post ablation earlier this year.  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  He does not have any heart pauses, but he will need to be watched for pauses or Kaba-Rodríguez.  We do not put pacemakers in prophylactically for this, but that he should be watched for this.  He does have a history of obstructive sleep apnea and that will need to be watched during the time of anesthesia and hospitalization.  He does have heart valve disease, and that is the crux of this note regarding when he should be admitted for Coumadin switching over to IV heparin.  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  He has coronary disease with a LIMA graft to the LAD and radial artery graft to the right coronary artery.  His last EKG from about a month and half ago showed sinus rhythm with the first-degree heart block, right bundle branch and left anterior hemiblock.  Today his heart rate is 51 when the nurse checked it, although when I listened it was closer to 60.  Blood pressure is 126/58.  He is on aspirin, which I suspect the surgeon will be happy leaving on long-term, but he will need to talk to the surgeon about if the aspirin should stop it.  The issue is going to be more about the  Coumadin.  His last several readings, his INR target is 2.5-3.5 and he has been running right close to the top of that, as opposed to the beginning of the year when he was running lower.  I am going to ask Dr. Reyes to discuss with the patient at his office visit today the timing.  If the surgery is scheduled for Monday, 07/22, the patient tells me he is scheduled for an INR on 07/18, which is Thursday.  I would probably recommend that the Coumadin be stopped either 07/16 or 07/17, get the INR checked on 07/18.  If the INR is still above 3.0, he will probably get admitted to the hospital on Saturday the 20th  If the INR is already at 2.0-2.5 or less on the 18th, then he should probably be admitted to the hospital on the 19th.  Obviously, it would have been perhaps easier if the surgery was done on a Thursday or so, and that would allow the patient to go into the clinic each day, but I do not think the clinic is open on Saturday and Sunday to get INR checks, and I am trying to limit the number of times the patient might have to be brought into the hospital for an INR and then sent home if his INR is too high, and I do not want the surgery to be canceled on the 22nd if the INR is still too high versus I do not want the patient to be sitting in the hospital getting IV heparin for days on end until the 22nd arrives.  Therefore, I did give the patient a note to give to Dr. Reyes if he wants to discuss it with me, but again my thought would be, if the patient is scheduled for an INR on the 18th, then stop the Coumadin on the 17th or perhaps even the 16th, and it depends on how high that INR is on the 18th which day he should then come to the hospital.  I am not recommending Lovenox only because I think the patient is at higher than average risk being off the blood thinner.  He has 2 mechanical valves, one of which is a repeat.  He had a previous history of stroke and atrial flutter, although that should be ablated, and I am  going to follow the more traditional recommendation of IV heparin versus subcutaneous Lovenox.  His other medicines can be continued as scheduled.  They may need to hold the metoprolol if the heart rate is running low.  I would have no problem that being held the day before or the day of surgery.  He is on a relatively low dose, but again he runs sinus rhythm with sinus bradycardia and the AV and bundle branch blocks as I listed above.        PHYSICAL EXAMINATION:  Exam today was very limited.  His lungs are completely clear.  Cardiac exam reveals heart rates in the 60s and it is a good mechanical click, no rub.  He does have varicose veins, which was addressed before.  He developed an unusual issue where apparently the bands were so tight, he developed blistering and there indeed is an area of scarring in site of what was probably old blood that has since been absorbed.  Those are going to be nonissues in terms of the surgery, but again there is always concern about venous thrombosis after orthopedic surgery, and this patient would be at somewhat higher risk because of the varicose veins already, so the timing of the blood thinners is going to be very important.        Other notes, he had a nuclear stress test in 04/2019 that showed probably an old inferior infarct and perhaps apical, but no ischemia.  His ejection fraction was normal at the time of that nuclear test.  His last echocardiogram was a transesophageal echo in 05/2018 that again showed normal LV function, mildly decreased RV systolic function that is probably related to his sleep apnea and CPAP.  His mechanical mitral and aortic valves were working well.        I am available if Dr. Reyes wishes to call us; otherwise, [facility] is available every day at [facility] if the hospitalist needs consultation.      Total visit time is 35 minutes, greater than 50% counseling.      Calderon Santo MD       cc:   Tu Reyes MD    [facility]    [address]  [place]  [address]       Prasanth Jensen MD    [facility]    [address] [place] [address], [address]    [place]  [address]         CALDERON SCHAEFFER MD             D: 2019   T: 2019   MT: SHLOMO      Name:     LIANG VAUGHN   MRN:      [id number]        Account:      [id number]   :      1940           Service Date: 2019      Document: [id number]         Outpatient Encounter Medications as of 2019   Medication Sig Dispense Refill     aspirin 81 MG EC tablet Take 1 tablet (81 mg) by mouth daily 1 tablet 0     furosemide (LASIX) 40 MG tablet Take 0.5 tablets (20 mg) by mouth daily 45 tablet 3     HERBALS White willow bark, Turmeric, Albina, botswellia & Yucca mixed powder(1.5 tsp mixed in cranberry juice or orange juice) every day 60 each 11     mesalamine (ASACOL HD) 800 MG EC tablet Take 1 tablet (800 mg) by mouth 2 times daily 180 tablet 11     metoprolol tartrate (LOPRESSOR) 25 MG tablet Take 1 tablet (25 mg) by mouth 2 times daily 180 tablet 3     mupirocin (BACTROBAN) 2 % external ointment Apply topically 2 times daily Apply small amount in each nostril twice daily for seven days prior to surgery       tamsulosin (FLOMAX) 0.4 MG capsule TAKE 1 CAPSULE BY MOUTH EVERY DAY 90 capsule 3     warfarin (COUMADIN) 5 MG tablet Take 5 mg (1 tablet) every Mon, Fri; 7.5 mg (1.5 tablets) all other days or as directed by INR Clinic 135 tablet 3     [DISCONTINUED] betamethasone valerate (VALISONE) 0.1 % external lotion APPLY TOPICALLY TWICE DAILY PRN 60 mL 3     [DISCONTINUED] ezetimibe (ZETIA) 10 MG tablet Take 1 tablet (10 mg) by mouth daily 90 tablet 3     [DISCONTINUED] rosuvastatin (CRESTOR) 40 MG tablet Take 1 tablet (40 mg) by mouth daily 90 tablet 3     No facility-administered encounter medications on file as of 2019.        Again, thank you for allowing me to participate in the care of your patient.      Sincerely,    Calderon Schaeffer MD      [facility]

## 2019-07-08 NOTE — PROGRESS NOTES
Service Date: 07/08/2019      HISTORY OF PRESENT ILLNESS:  This is a brief note regarding Fausto Farr, who is scheduled for knee replacement surgery with Dr. Jensen on 07/22 at [facility].  This patient has an extensive cardiac history.  We have outlined it before, but to hit the highlights especially for the anesthesiologist, he has a history of atrial flutter status post ablation earlier this year.  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  He does not have any heart pauses, but he will need to be watched for pauses or Kaba-Rodríguez.  We do not put pacemakers in prophylactically for this, but that he should be watched for this.  He does have a history of obstructive sleep apnea and that will need to be watched during the time of anesthesia and hospitalization.  He does have heart valve disease, and that is the crux of this note regarding when he should be admitted for Coumadin switching over to IV heparin.  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  He has coronary disease with a LIMA graft to the LAD and radial artery graft to the right coronary artery.  His last EKG from about a month and half ago showed sinus rhythm with the first-degree heart block, right bundle branch and left anterior hemiblock.  Today his heart rate is 51 when the nurse checked it, although when I listened it was closer to 60.  Blood pressure is 126/58.  He is on aspirin, which I suspect the surgeon will be happy leaving on long-term, but he will need to talk to the surgeon about if the aspirin should stop it.  The issue is going to be more about the Coumadin.  His last several readings, his INR target is 2.5-3.5 and he has been running right close to the top of that, as opposed to the beginning of the year when he was running lower.  I am going to ask Dr. Reyes to discuss with the  patient at his office visit today the timing.  If the surgery is scheduled for Monday, 07/22, the patient tells me he is scheduled for an INR on 07/18, which is Thursday.  I would probably recommend that the Coumadin be stopped either 07/16 or 07/17, get the INR checked on 07/18.  If the INR is still above 3.0, he will probably get admitted to the hospital on Saturday the 20th  If the INR is already at 2.0-2.5 or less on the 18th, then he should probably be admitted to the hospital on the 19th.  Obviously, it would have been perhaps easier if the surgery was done on a Thursday or so, and that would allow the patient to go into the clinic each day, but I do not think the clinic is open on Saturday and Sunday to get INR checks, and I am trying to limit the number of times the patient might have to be brought into the hospital for an INR and then sent home if his INR is too high, and I do not want the surgery to be canceled on the 22nd if the INR is still too high versus I do not want the patient to be sitting in the hospital getting IV heparin for days on end until the 22nd arrives.  Therefore, I did give the patient a note to give to Dr. Reyes if he wants to discuss it with me, but again my thought would be, if the patient is scheduled for an INR on the 18th, then stop the Coumadin on the 17th or perhaps even the 16th, and it depends on how high that INR is on the 18th which day he should then come to the hospital.  I am not recommending Lovenox only because I think the patient is at higher than average risk being off the blood thinner.  He has 2 mechanical valves, one of which is a repeat.  He had a previous history of stroke and atrial flutter, although that should be ablated, and I am going to follow the more traditional recommendation of IV heparin versus subcutaneous Lovenox.  His other medicines can be continued as scheduled.  They may need to hold the metoprolol if the heart rate is running low.  I would have  no problem that being held the day before or the day of surgery.  He is on a relatively low dose, but again he runs sinus rhythm with sinus bradycardia and the AV and bundle branch blocks as I listed above.        PHYSICAL EXAMINATION:  Exam today was very limited.  His lungs are completely clear.  Cardiac exam reveals heart rates in the 60s and it is a good mechanical click, no rub.  He does have varicose veins, which was addressed before.  He developed an unusual issue where apparently the bands were so tight, he developed blistering and there indeed is an area of scarring in site of what was probably old blood that has since been absorbed.  Those are going to be nonissues in terms of the surgery, but again there is always concern about venous thrombosis after orthopedic surgery, and this patient would be at somewhat higher risk because of the varicose veins already, so the timing of the blood thinners is going to be very important.        Other notes, he had a nuclear stress test in 04/2019 that showed probably an old inferior infarct and perhaps apical, but no ischemia.  His ejection fraction was normal at the time of that nuclear test.  His last echocardiogram was a transesophageal echo in 05/2018 that again showed normal LV function, mildly decreased RV systolic function that is probably related to his sleep apnea and CPAP.  His mechanical mitral and aortic valves were working well.        I am available if Dr. Reyes wishes to call us; otherwise, [facility] is available every day at [facility] if the hospitalist needs consultation.      Total visit time is 35 minutes, greater than 50% counseling.      Dipak Schaeffer MD       cc:   Tu Reyes MD    [address]  [address]       Prasanth Jensen MD    [facility]    [address] [place] [address], [address]    [place]  [address]         DIPAK SCHAEFFER MD             D: 07/08/2019   T: 07/08/2019    MT: SHLOMO      Name:     LIANG VAUGHN   MRN:      [id number]        Account:      [id number]   :      1940           Service Date: 2019      Document: [id number]

## 2019-07-08 NOTE — PATIENT INSTRUCTIONS
Before Your Surgery      Call your surgeon if there is any change in your health. This includes signs of a cold or flu (such as a sore throat, runny nose, cough, rash or fever).    Do not smoke, drink alcohol or take over the counter medicine (unless your surgeon or primary care doctor tells you to) for the 24 hours before and after surgery.    If you take prescribed drugs: Follow your doctor s orders about which medicines to take and which to stop until after surgery.    May take all meds before surgery, except:  Cardiology is recommending admission 2 days prior to surgery for IV heparin, instead of lovenox.  We will stop coumadin after a dose on 7/17, check his INR on July 19 to ensure it is not coming down too quickly, then plan to admit on July 20.   While in hosptial, they may also hold your metoprolol on July 21st.  Stop the aspirin after the dose on 17th as well.       Eating and drinking prior to surgery: follow the instructions from your surgeon    Take a shower or bath the night before surgery. Use the soap your surgeon gave you to gently clean your skin. If you do not have soap from your surgeon, use your regular soap. Do not shave or scrub the surgery site.  Wear clean pajamas and have clean sheets on your bed.

## 2019-07-08 NOTE — PROGRESS NOTES
[facility] [place]  [address]  [address]  [place] [address]  [phone]  Dept: [phone]    PRE-OP EVALUATION:  Today's date: 2019    Fausto Farr (: 1940) presents for pre-operative evaluation assessment as requested by Dr. Prasanth Jensen.  He requires evaluation and anesthesia risk assessment prior to undergoing surgery/procedure for treatment of Arthroplasty, right knee.  Yann Benjamin.     Proposed Surgery/ Procedure: Arthroplasty  Date of Surgery/ Procedure: 19  Time of Surgery/ Procedure: 9:00 AM  Hospital/Surgical Facility: [facility]   Available electronically   Primary Physician: Tu Reyes  Type of Anesthesia Anticipated: Other     Patient has a Health Care Directive or Living Will:  YES In chart    1. Yes- stroke and heart attack - Do you have a history of heart attack, stroke, stent, bypass or surgery on an artery in the head, neck, heart or legs?  2. NO - Do you ever have any pain or discomfort in your chest?  3. Unsure - Do you have a history of  Heart Failure?  4. Yes - Are you troubled by shortness of breath when: walking on the level, up a slight hill or at night?  5. NO - Do you currently have a cold, bronchitis or other respiratory infection?  6. NO - Do you have a cough, shortness of breath or wheezing?  7. NO - Do you sometimes get pains in the calves of your legs when you walk?  8. NO - Do you or anyone in your family have previous history of blood clots?  9. NO - Do you or does anyone in your family have a serious bleeding problem such as prolonged bleeding following surgeries or cuts?  10. NO - Have you ever had problems with anemia or been told to take iron pills?  11. NO - Have you had any abnormal blood loss such as black, tarry or bloody stools, or abnormal vaginal bleeding?  12. NO - Have you ever had a blood transfusion?  13. NO - Have you or any of your relatives ever had problems with  anesthesia?  14. Yes - Do you have sleep apnea, excessive snoring or daytime drowsiness?  15. Yes - Do you have any prosthetic heart valves?  16. NO - Do you have prosthetic joints?  17. NO - Is there any chance that you may be pregnant?    Patient would like refills on medications, needs written Rx. Patient is changing pharmacy.       HPI:     HPI related to upcoming procedure: right knee osteoarthritis.       See problem list for active medical problems.  Problems all longstanding and stable, except as noted/documented.  See ROS for pertinent symptoms related to these conditions.      MEDICAL HISTORY:     Patient Active Problem List    Diagnosis Date Noted     Sepsis due to group B Streptococcus (H) 05/19/2018     Priority: High     Obesity (BMI 35.0-39.9) with comorbidity (H) 05/30/2019     Priority: Medium     Typical atrial flutter (H) 04/15/2019     Priority: Medium     Added automatically from request for surgery [id number]       GAGE (obstructive sleep apnea) 10/02/2017     Priority: Medium     Long term current use of anticoagulant therapy 03/21/2016     Priority: Medium     Chronic systolic congestive heart failure (H) 03/21/2016     Priority: Medium     S/P lumbar spinal fusion 10/29/2015     Priority: Medium     Personal history of tobacco use, presenting hazards to health 10/28/2015     Priority: Medium     Quit 2002       Spinal stenosis of lumbar region without neurogenic claudication 10/28/2015     Priority: Medium     S/P mitral valve replacement 10/28/2015     Priority: Medium     RBBB with left anterior fascicular block 10/28/2015     Priority: Medium     S/P aortic valve replacement      Priority: Medium     developed perivalve leak and MS, therefore redo surg 2013       S/P CABG (coronary artery bypass graft)      Priority: Medium     with AVR Aguirre-Lad, RA-Rca       Stented coronary artery      Priority: Medium     Mixed hyperlipidemia      Priority: Medium     Diagnosis updated by automated  process. Provider to review and confirm.       PVD (peripheral vascular disease) (H)      Priority: Medium     Abnormal ECG      Priority: Medium     RBBB, 1st degree AVB, Left axis deviation       ACP (advance care planning) 10/09/2015     Priority: Medium     Advance Care Planning 10/9/2015: Receipt of ACP document:  Received: Health Care Directive which was witnessed or notarized on 9-18-15.  Document not previously scanned.  Validation form completed and sent with document to be scanned.  Code Status reflects choices in most recent ACP document.  Confirmed/documented designated decision maker(s).  Added by Aster Rios RN Advance Care Planning Liaison with [employer]  Advance Care Planning 10/9/2015: ACP Review and Resources Provided:  Reviewed chart for advance care plan.  Fausto Farr has no plan or code status on file however states presence of ACP document. Copy requested. Confirmed code status reflects current choices pending receipt of document/advance care plan review. Confirmed/documented legally designated decision maker(s). Added by Aster Rios RN Advance Care Planning Liaison with [facility]           Urinary retention 12/04/2013     Priority: Medium     Health Care Home 10/24/2013     Priority: Medium     Status:  Closed  Care Coordinator:  Susan Carrillo RN CCM    See Letters for [facility] Care Plan         MRSA (methicillin resistant Staphylococcus aureus) 10/17/2013     Priority: Medium     Nares 10-16-13       Heart murmur      Priority: Medium     Valvular heart disease 09/16/2013     Priority: Medium     Mitral and Aortic Valve       Vitamin D deficiency disease 08/06/2013     Priority: Medium     Anemia relative at 12.5 in 8-13  08/06/2013     Priority: Medium     Essential hypertension, benign 08/06/2013     Priority: Medium     Hyperlipidemia LDL goal <100 08/06/2013     Priority: Medium     Prostate cancer (H) 08/06/2013     Priority: Medium     Glucose intolerance (impaired  glucose tolerance) 08/06/2013     Priority: Medium     Sciatica of left side since 2000 08/06/2013     Priority: Medium     Somatic dysfunction of pelvic region 10/18/2012     Priority: Medium     Problem list name updated by automated process. Provider to review       Contact dermatitis and other eczema, due to unspecified cause 10/18/2012     Priority: Medium     Ulcerative colitis (H) 10/18/2012     Priority: Medium     Problem list name updated by automated process. Provider to review       Atrial fibrillation (H) 10/18/2012     Priority: Medium     Other specified anemias 10/18/2012     Priority: Medium     Gastric ulcer 10/18/2012     Priority: Medium     Problem list name updated by automated process. Provider to review       Bilateral low back pain with left-sided sciatica 10/18/2012     Priority: Medium     Nonallopathic lesion of lumbar region 10/18/2012     Priority: Medium     Problem list name updated by automated process. Provider to review       Nonallopathic lesion of sacral region 10/18/2012     Priority: Medium     Problem list name updated by automated process. Provider to review       Diaphragmatic hernia 10/18/2012     Priority: Medium     Problem list name updated by automated process. Provider to review       Abdominal pain, epigastric 10/18/2012     Priority: Medium     Malaise and fatigue 10/18/2012     Priority: Medium     Problem list name updated by automated process. Provider to review       Nocturia 10/18/2012     Priority: Medium     Nonallopathic lesion of cervical region 10/18/2012     Priority: Medium     Problem list name updated by automated process. Provider to review       Nonallopathic lesion of thoracic region 10/18/2012     Priority: Medium     Problem list name updated by automated process. Provider to review       Malignant neoplasm of prostate (H) 10/18/2012     Priority: Medium     Abdominal aortic aneurysm (H) 10/18/2012     Priority: Medium     6.5 cm 4/2015  u/s  Problem list name updated by automated process. Provider to review       Nonallopathic lesion of rib cage 10/18/2012     Priority: Medium     Problem list name updated by automated process. Provider to review       Coronary artery disease 10/18/2012     Priority: Medium     AF (atrial fibrillation) (H) 10/18/2012     Priority: Medium     Rotator cuff (capsule) sprain 05/10/2010     Priority: Medium      Past Medical History:   Diagnosis Date     Abdominal pain      Abnormal ECG     RBBB, 1st degree AVB, Left axis deviation     Anemia     currently taking iron     Arrhythmia     pac, pvc     Back pain     since 1980     BPH (benign prostatic hyperplasia)      Bruit      CAD (coronary artery disease)      Cellulitis 10/18/12     Cellulitis 05/2018    GrpB strep LLE cellulitis  negative RACHAEL for veg     Chronic venous insufficiency     bilat lower extremities     Contact dermatitis and other eczema, due to unspecified cause      Diaphragmatic hernia without mention of obstruction or gangrene      Diastolic HF (heart failure) (H)      Gastric ulcer      Glucose intolerance (impaired glucose tolerance)      Heart murmur 9/16/13    valvular heart disease     Hyperlipidemia LDL goal <100 8/6/2013     Hypertension 8/6/13     Lumbago      Malaise and fatigue      Metabolic syndrome      Mobitz (type) I (Wenckebach's) atrioventricular block     and RBBB     Nocturia 10/18/12     Nonallopathic lesion of cervical region      Nonallopathic lesion of lumbar region      Nonallopathic lesion of pelvic region, not elsewhere classified      Nonallopathic lesion of rib cage      Nonallopathic lesion of sacral region      GAGE (obstructive sleep apnea)     CPAP     Paroxysmal atrial fibrillation (H) 10/18/12     Prostate cancer (H) 2008    radiation seed, XRT      PVD (peripheral vascular disease) (H)      RBBB     1st degree AVB, RBBB, LAHB     Rotator cuff strain     and sprain     S/P AAA repair      S/P aortic valve replacement  2006    developed perivalve leak and MS, therefore redo surg 2013     S/P CABG (coronary artery bypass graft) 2006    Lima-Lad, RA-Rca     Sciatica of left side     since 2000     Sepsis due to group B Streptococcus (H) 5/19/2018     Ulcerative colitis (H)      Varicose veins of bilateral lower extremities with other complications     s/p RLE vein stripping     Vitamin D deficiency      Past Surgical History:   Procedure Laterality Date     AORTIC VALVE REPLACEMENT  1/3/06    redo AVR SJM 21mm and SJM MVR 27mm in 2013SJM 21([id number]):AVR, SJM 27 :MVR-     ARTHROPLASTY KNEE      right knee     BACK SURGERY  Oct 2015    Fusion L4-5, laminectomy L2, L3     BYPASS GRAFT ARTERY CORONARY  10/2013    reimplantation of radial artery graft to RCA     C CABG, VEIN, TWO  1/3/06    Left radial to RCA, LIMA to LAD (RA to RCA reimplanted at time of 2013 surg)     CARDIAC CATHERIZATION  11/2005    Stent placed to RCA     CARDIAC CATHERIZATION  04/2013    Occluded RCA, patent LIMA to LAD and radial graft to PDA     CARPAL TUNNEL RELEASE RT/LT  1994     COLONOSCOPY  8-22-11     CYSTOSCOPY FLEXIBLE  10/16/2013    Procedure: CYSTOSCOPY FLEXIBLE;  FLEXIBLE CYSTOSCOPY / DILATION OF URETHRA / INSERTION OF LESLIE;  Surgeon: Cooper Wallace MD;  Location:  OR     ENDOVASCULAR REPAIR, INFRARENAL ABDOMINAL AORTIC ANEURYSM/DISSECTION; MODULAR BIFURCATED PROSTHESIS  2006    AAA repair endovascular     ENT SURGERY       EP ABLATION ATRIAL FLUTTER N/A 4/22/2019    Procedure: EP Ablation Atrial Flutter;  Surgeon: Jessy Vasquez MD;  Location:  [facility] CATH LAB     GENITOURINARY SURGERY  6/16/08    radioactive seeding     HEAD & NECK SURGERY  1997    vocal cord polypectomy     KNEE SURGERY  2001 Right knee arthroscopy     OPTICAL TRACKING SYSTEM FUSION SPINE POSTERIOR LUMBAR THREE+ LEVELS N/A 10/29/2015    Procedure: OPTICAL TRACKING SYSTEM FUSION SPINE POSTERIOR LUMBAR THREE+ LEVELS;  Surgeon: Walt Garcia MD;   Location: [facility] OR     PROSTATE SURGERY      radioactive seeding 6/16/08     REPAIR ANEURYSM ABDOMINAL AORTA  06/08     REPAIR VALVE MITRAL  10/16/2013    SJM 21([id number] [id number]):AVR, SJM 27  501:MVRProcedure: REPAIR VALVE MITRAL;  REDO STERNOTOMY/REDO AORTIC VALVE REPLACEMENT/ MITRAL VALVE REPLACEMENT/REIMPLANTATION OF RIGHT CORONARY ARTERY BYPASS WITH RACHAEL ( ON PUMP);  Surgeon: Viet Singh MD;  Location: [facility] OR     REPLACE VALVE AORTIC  10/16/2013    Procedure: REPLACE VALVE AORTIC;;  Surgeon: Viet Singh MD;  Location: [facility] OR     SURGERY GENERAL IP CONSULT  05/2008 Excision aneurysm abdominal aorta     SURGERY GENERAL IP CONSULT  1997 Vocal cord polypectomy     VASCULAR SURGERY  1968, 1993     varicose vein stripping     Current Outpatient Medications   Medication Sig Dispense Refill     aspirin 81 MG EC tablet Take 1 tablet (81 mg) by mouth daily 1 tablet 0     ezetimibe (ZETIA) 10 MG tablet Take 1 tablet (10 mg) by mouth daily 90 tablet 3     fluocinonide (LIDEX) 0.05 % external ointment Apply topically 2 times daily 60 g 2     furosemide (LASIX) 40 MG tablet Take 0.5 tablets (20 mg) by mouth daily 45 tablet 3     mesalamine (ASACOL HD) 800 MG EC tablet Take 1 tablet (800 mg) by mouth 2 times daily 180 tablet 11     metoprolol tartrate (LOPRESSOR) 25 MG tablet Take 1 tablet (25 mg) by mouth 2 times daily 180 tablet 3     rosuvastatin (CRESTOR) 40 MG tablet Take 1 tablet (40 mg) by mouth daily 90 tablet 3     tamsulosin (FLOMAX) 0.4 MG capsule TAKE 1 CAPSULE BY MOUTH EVERY DAY 90 capsule 3     warfarin (COUMADIN) 5 MG tablet Take 5 mg (1 tablet) every Mon, Fri; 7.5 mg (1.5 tablets) all other days or as directed by INR Clinic 135 tablet 3     HERBALS White willow bark, Turmeric, Albina, botswellia & Yucca mixed powder(1.5 tsp mixed in cranberry juice or orange juice) every day 60 each 11     mupirocin (BACTROBAN) 2 % external ointment Apply topically 2 times daily Apply small amount in each nostril  "twice daily for seven days prior to surgery       OTC products: None, except as noted above    Allergies   Allergen Reactions     Bees Anaphylaxis      Latex Allergy: NO    Social History     Tobacco Use     Smoking status: Former Smoker     Packs/day: 1.00     Years: 40.00     Pack years: 40.00     Start date: 4/15/1962     Last attempt to quit: 10/23/2002     Years since quittin.7     Smokeless tobacco: Never Used   Substance Use Topics     Alcohol use: Yes     Comment: a couple beers per week (socially)     History   Drug Use No       REVIEW OF SYSTEMS:   CONSTITUTIONAL: NEGATIVE for fever, chills, change in weight  INTEGUMENTARY/SKIN: NEGATIVE for worrisome rashes, moles or lesions  EYES: NEGATIVE for vision changes or irritation  ENT/MOUTH: NEGATIVE for ear, mouth and throat problems  RESP: NEGATIVE for significant cough or SOB  BREAST: NEGATIVE for masses, tenderness or discharge  CV: NEGATIVE for chest pain, palpitations or peripheral edema  GI: NEGATIVE for nausea, abdominal pain, heartburn, or change in bowel habits  : NEGATIVE for frequency, dysuria, or hematuria  MUSCULOSKELETAL: NEGATIVE for significant arthralgias or myalgia  NEURO: NEGATIVE for weakness, dizziness or paresthesias  ENDOCRINE: NEGATIVE for temperature intolerance, skin/hair changes  HEME: NEGATIVE for bleeding problems  PSYCHIATRIC: NEGATIVE for changes in mood or affect    EXAM:   /54 (BP Location: Right arm, Patient Position: Sitting, Cuff Size: Adult Large)   Pulse 60   Temp 97.9  F (36.6  C) (Oral)   Ht 1.676 m (5' 6\")   Wt 102.5 kg (225 lb 14.4 oz)   SpO2 95%   BMI 36.46 kg/m      GENERAL APPEARANCE: healthy, alert and no distress     EYES: EOMI,  PERRL     HENT: ear canals and TM's normal and nose and mouth without ulcers or lesions     NECK: no adenopathy, no asymmetry, masses, or scars and thyroid normal to palpation     RESP: lungs clear to auscultation - no rales, rhonchi or wheezes     CV: regular rates and " rhythm, normal S1 S2, no S3 or S4 and no murmur, click or rub     ABDOMEN:  soft, nontender, no HSM or masses and bowel sounds normal     MS: extremities normal- no gross deformities noted, no evidence of inflammation in joints, FROM in all extremities.     SKIN: no suspicious lesions or rashes     NEURO: Normal strength and tone, sensory exam grossly normal, mentation intact and speech normal     PSYCH: mentation appears normal. and affect normal/bright     LYMPHATICS: No cervical adenopathy    DIAGNOSTICS:   EKG: RBBB; old inferior infarct, from May 2019    Recent Labs   Lab Test 06/25/19  0902 05/30/19  1101 05/21/19  0835 04/22/19  0724  04/25/17  1244  10/19/13  0430   HGB  --  14.4  --  13.7   < > 12.9*   < > 8.0*   PLT  --  300  --  267   < > 282   < > 155   INR 3.5*  --  3.3* 2.9*   < >  --    < > 1.23*   NA  --  140  --  139   < > 139   < > 134   POTASSIUM  --  4.6  --  4.1   < > 4.5   < > 4.8   CR  --  0.97  --  0.94   < > 1.00   < > 1.22   A1C  --   --   --   --   --  5.9  --  6.8*    < > = values in this interval not displayed.        IMPRESSION:   Reason for surgery/procedure: right knee osteoarthritis.   Diagnosis/reason for consult: preoperative evaluation     The proposed surgical procedure is considered INTERMEDIATE risk.    REVISED CARDIAC RISK INDEX  The patient has the following serious cardiovascular risks for perioperative complications such as (MI, PE, VFib and 3  AV Block):  Coronary Artery Disease (MI, positive stress test, angina, Qs on EKG)  INTERPRETATION: 1 risks: Class II (low risk - 0.9% complication rate)    The patient has the following additional risks for perioperative complications:  No identified additional risks      ICD-10-CM    1. Preop general physical exam Z01.818    2. Mixed hyperlipidemia E78.2 ezetimibe (ZETIA) 10 MG tablet   3. Hyperlipidemia, unspecified hyperlipidemia type E78.5 rosuvastatin (CRESTOR) 40 MG tablet   4. Rash R21 fluocinonide (LIDEX) 0.05 % external  ointment   5. Valvular heart disease I38    6. Typical atrial flutter (H) I48.3    7. GAGE (obstructive sleep apnea) G47.33    8. Chronic systolic congestive heart failure (H) I50.22    9. Coronary artery disease involving coronary bypass graft 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 I25.810    10. Primary osteoarthritis of right knee M17.11        RECOMMENDATIONS:     --Consult hospital rounder / IM to assist post-op medical management    (Z01.818) Preop general physical exam  (primary encounter diagnosis)  Comment:   Plan: Advised to stop all aspirin products and nonsteroidals (like ibuprofen or naproxen) for 10 days prior to procedure.  Advised follow surgical center's instructions re: arrival time and when to stop eating.     (E78.2) Mixed hyperlipidemia  Comment:   Plan: ezetimibe (ZETIA) 10 MG tablet        Refilled.     (E78.5) Hyperlipidemia, unspecified hyperlipidemia type  Comment:   Plan: rosuvastatin (CRESTOR) 40 MG tablet        Continue statin.     (R21) Rash  Comment:   Plan: fluocinonide (LIDEX) 0.05 % external ointment        Refilled at patient request.     (I38) Valvular heart disease  Comment:   Plan: S/p Aortic and mitral replacement. Cardiology is recommending admission 2 days prior to surgery for IV heparin, instead of lovenox. Dr. Santo p .  We will stop his coumadin after a dose on 7/17, check his INR on July 19 to ensure it is not coming down too quickly, then plan to admit on July 20.     (I48.3) Typical atrial flutter (H)  Comment:   Plan:     (G47.33) GAGE (obstructive sleep apnea)  Comment:   Plan: Continue with continuous positive airway pressure.     (I25.810) Coronary artery disease involving coronary bypass graft 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  Comment:   Plan: secondary risk factor modification.     (M17.11) Primary osteoarthritis of right knee  Comment:   Plan: management per orthopedics.  I called and spoke with Dr. Jensen's office, who will be arranging an  early admission on July 20th, together with starting him on IV heparin drip (instead of lovenox).  They have assured me that patient will be called with the details of where to show up on the 20th, and then an IM consult will occur to begin him on an IV heparin protocol.     (C61) Malignant neoplasm of prostate (H)  Comment:   Plan: s/p surgery and radiation therapy. Currently asymptomatic.          APPROVAL GIVEN to proceed with proposed procedure, without further diagnostic evaluation       Signed Electronically by: Magdiel Dumas MD    Copy of this evaluation report is provided to requesting physician.    [place] Preop Guidelines    Revised Cardiac Risk Index

## 2019-07-08 NOTE — PROGRESS NOTES
HPI and Plan:   See dictation    No orders of the defined types were placed in this encounter.    No orders of the defined types were placed in this encounter.    There are no discontinued medications.      Encounter Diagnoses   Name Primary?     Hyperlipidemia LDL goal <100      Essential hypertension, benign      Abdominal aortic aneurysm (AAA) without rupture (H)      Paroxysmal atrial fibrillation (H)      S/P mitral valve replacement      S/P CABG (coronary artery bypass graft)      S/P aortic valve replacement        CURRENT MEDICATIONS:  Current Outpatient Medications   Medication Sig Dispense Refill     aspirin 81 MG EC tablet Take 1 tablet (81 mg) by mouth daily 1 tablet 0     betamethasone valerate (VALISONE) 0.1 % external lotion APPLY TOPICALLY TWICE DAILY PRN 60 mL 3     ezetimibe (ZETIA) 10 MG tablet Take 1 tablet (10 mg) by mouth daily 90 tablet 3     furosemide (LASIX) 40 MG tablet Take 0.5 tablets (20 mg) by mouth daily 45 tablet 3     HERBALS White willow bark, Turmeric, Albina, botswellia & Yucca mixed powder(1.5 tsp mixed in cranberry juice or orange juice) every day 60 each 11     mesalamine (ASACOL HD) 800 MG EC tablet Take 1 tablet (800 mg) by mouth 2 times daily 180 tablet 11     metoprolol tartrate (LOPRESSOR) 25 MG tablet Take 1 tablet (25 mg) by mouth 2 times daily 180 tablet 3     mupirocin (BACTROBAN) 2 % external ointment Apply topically 2 times daily Apply small amount in each nostril twice daily for seven days prior to surgery       rosuvastatin (CRESTOR) 40 MG tablet Take 1 tablet (40 mg) by mouth daily 90 tablet 3     tamsulosin (FLOMAX) 0.4 MG capsule TAKE 1 CAPSULE BY MOUTH EVERY DAY 90 capsule 3     warfarin (COUMADIN) 5 MG tablet Take 5 mg (1 tablet) every Mon, Fri; 7.5 mg (1.5 tablets) all other days or as directed by INR Clinic 135 tablet 3       ALLERGIES     Allergies   Allergen Reactions     Bees Anaphylaxis       PAST MEDICAL HISTORY:  Past Medical History:   Diagnosis  Date     Abdominal pain      Abnormal ECG     RBBB, 1st degree AVB, Left axis deviation     Anemia     currently taking iron     Arrhythmia     pac, pvc     Back pain     since 1980     BPH (benign prostatic hyperplasia)      Bruit      CAD (coronary artery disease)      Cellulitis 10/18/12     Cellulitis 05/2018    GrpB strep LLE cellulitis  negative RACHAEL for veg     Chronic venous insufficiency     bilat lower extremities     Contact dermatitis and other eczema, due to unspecified cause      Diaphragmatic hernia without mention of obstruction or gangrene      Diastolic HF (heart failure) (H)      Gastric ulcer      Glucose intolerance (impaired glucose tolerance)      Heart murmur 9/16/13    valvular heart disease     Hyperlipidemia LDL goal <100 8/6/2013     Hypertension 8/6/13     Lumbago      Malaise and fatigue      Metabolic syndrome      Mobitz (type) I (Wenckebach's) atrioventricular block     and RBBB     Nocturia 10/18/12     Nonallopathic lesion of cervical region      Nonallopathic lesion of lumbar region      Nonallopathic lesion of pelvic region, not elsewhere classified      Nonallopathic lesion of rib cage      Nonallopathic lesion of sacral region      GAGE (obstructive sleep apnea)     CPAP     Paroxysmal atrial fibrillation (H) 10/18/12     Prostate cancer (H) 2008    radiation seed, XRT      PVD (peripheral vascular disease) (H)      RBBB     1st degree AVB, RBBB, LAHB     Rotator cuff strain     and sprain     S/P AAA repair      S/P aortic valve replacement 2006    developed perivalve leak and MS, therefore redo surg 2013     S/P CABG (coronary artery bypass graft) 2006    Lima-Lad, RA-Rca     Sciatica of left side     since 2000     Sepsis due to group B Streptococcus (H) 5/19/2018     Ulcerative colitis (H)      Varicose veins of bilateral lower extremities with other complications     s/p RLE vein stripping     Vitamin D deficiency        PAST SURGICAL HISTORY:  Past Surgical History:    Procedure Laterality Date     AORTIC VALVE REPLACEMENT  1/3/06    redo AVR [facility] 21mm and [facility] MVR 27mm in 2013SJ 21([id number] [id number]):AVR, [facility] 27  501:MVR-     ARTHROPLASTY KNEE      right knee     BACK SURGERY  Oct 2015    Fusion L4-5, laminectomy L2, L3     BYPASS GRAFT ARTERY CORONARY  10/2013    reimplantation of radial artery graft to RCA     C CABG, VEIN, TWO  1/3/06    Left radial to RCA, LIMA to LAD (RA to RCA reimplanted at time of 2013 surg)     CARDIAC CATHERIZATION  11/2005    Stent placed to RCA     CARDIAC CATHERIZATION  04/2013    Occluded RCA, patent LIMA to LAD and radial graft to PDA     CARPAL TUNNEL RELEASE RT/LT  1994     COLONOSCOPY  8-22-11     CYSTOSCOPY FLEXIBLE  10/16/2013    Procedure: CYSTOSCOPY FLEXIBLE;  FLEXIBLE CYSTOSCOPY / DILATION OF URETHRA / INSERTION OF LESLIE;  Surgeon: Cooper Wallace MD;  Location:  OR     ENDOVASCULAR REPAIR, INFRARENAL ABDOMINAL AORTIC ANEURYSM/DISSECTION; MODULAR BIFURCATED PROSTHESIS  2006    AAA repair endovascular     ENT SURGERY       EP ABLATION ATRIAL FLUTTER N/A 4/22/2019    Procedure: EP Ablation Atrial Flutter;  Surgeon: Jessy Vasquez MD;  Location:  [facility]     GENITOURINARY SURGERY  6/16/08    radioactive seeding     HEAD & NECK SURGERY  1997    vocal cord polypectomy     KNEE SURGERY  2001 Right knee arthroscopy     OPTICAL TRACKING SYSTEM FUSION SPINE POSTERIOR LUMBAR THREE+ LEVELS N/A 10/29/2015    Procedure: OPTICAL TRACKING SYSTEM FUSION SPINE POSTERIOR LUMBAR THREE+ LEVELS;  Surgeon: Walt Garcia MD;  Location:  OR     PROSTATE SURGERY      radioactive seeding 6/16/08     REPAIR ANEURYSM ABDOMINAL AORTA  06/08     REPAIR VALVE MITRAL  10/16/2013    [facility] 21(AGFN 756):AVR, [facility] 27  501:MVRProcedure: REPAIR VALVE MITRAL;  REDO STERNOTOMY/REDO AORTIC VALVE REPLACEMENT/ MITRAL VALVE REPLACEMENT/REIMPLANTATION OF RIGHT CORONARY ARTERY BYPASS WITH RACHAEL ( ON PUMP);  Surgeon: Viet Singh MD;  Location:  [facility] OR     REPLACE VALVE AORTIC  10/16/2013    Procedure: REPLACE VALVE AORTIC;;  Surgeon: Viet Singh MD;  Location: [facility] OR     SURGERY GENERAL IP CONSULT  2008 Excision aneurysm abdominal aorta     SURGERY GENERAL IP CONSULT   Vocal cord polypectomy     VASCULAR SURGERY  1993     varicose vein stripping       FAMILY HISTORY:  Family History   Problem Relation Age of Onset     Coronary Artery Disease Father         CABG     Heart Disease Father         Pacemaker     Other Cancer Daughter      Heart Disease Brother      Other - See Comments Grandchild        SOCIAL HISTORY:  Social History     Socioeconomic History     Marital status:      Spouse name: None     Number of children: None     Years of education: None     Highest education level: None   Occupational History     None   Social Needs     Financial resource strain: None     Food insecurity:     Worry: None     Inability: None     Transportation needs:     Medical: None     Non-medical: None   Tobacco Use     Smoking status: Former Smoker     Packs/day: 1.00     Years: 40.00     Pack years: 40.00     Start date: 4/15/1962     Last attempt to quit: 10/23/2002     Years since quittin.7     Smokeless tobacco: Never Used   Substance and Sexual Activity     Alcohol use: Yes     Comment: a couple beers per week (socially)     Drug use: No     Sexual activity: Never   Lifestyle     Physical activity:     Days per week: None     Minutes per session: None     Stress: None   Relationships     Social connections:     Talks on phone: None     Gets together: None     Attends Druze service: None     Active member of club or organization: None     Attends meetings of clubs or organizations: None     Relationship status: None     Intimate partner violence:     Fear of current or ex partner: None     Emotionally abused: None     Physically abused: None     Forced sexual activity: None   Other Topics Concern     Parent/sibling w/ CABG,  "MI or angioplasty before 65F 55M? Yes     Comment: Brother had bypass at 55      Service Not Asked     Blood Transfusions Not Asked     Caffeine Concern No     Comment: 6-8 cups of half and half per day     Occupational Exposure Not Asked     Hobby Hazards Not Asked     Sleep Concern Not Asked     Stress Concern Not Asked     Weight Concern Not Asked     Special Diet No     Back Care Not Asked     Exercise No     Bike Helmet Not Asked     Seat Belt Not Asked     Self-Exams Not Asked   Social History Narrative     None       Review of Systems:  Skin:  Negative     Eyes:  Positive for glasses  ENT:  Positive for hearing loss  Respiratory:  Positive for dyspnea on exertion;sleep apnea;CPAP  Cardiovascular:    chest pain;Positive for  Gastroenterology: Positive for reflux  Genitourinary:  Negative    Musculoskeletal:  Positive for back pain;joint pain;arthritis  Neurologic:  Positive for headaches  Psychiatric:  Negative    Heme/Lymph/Imm:  Negative    Endocrine:  Negative      Physical Exam:  Vitals: /58 (BP Location: Right arm, Patient Position: Sitting, Cuff Size: Adult Large)   Pulse 51   Ht 1.676 m (5' 6\")   Wt 102.5 kg (226 lb)   SpO2 97%   BMI 36.48 kg/m      Constitutional:           Skin:             Head:           Eyes:           Lymph:      ENT:           Neck:           Respiratory:            Cardiac:                                                           GI:           Extremities and Muscular Skeletal:                 Neurological:           Psych:         Recent Lab Results:  LIPID RESULTS:  Lab Results   Component Value Date    CHOL 157 05/30/2019    HDL 43 05/30/2019    LDL 92 05/30/2019    TRIG 110 05/30/2019    CHOLHDLRATIO 3.6 06/03/2015       LIVER ENZYME RESULTS:  Lab Results   Component Value Date    AST 41 05/30/2019    ALT 70 05/30/2019       CBC RESULTS:  Lab Results   Component Value Date    WBC 10.3 05/30/2019    RBC 4.86 05/30/2019    HGB 14.4 05/30/2019    HCT 44.0 " 05/30/2019    MCV 91 05/30/2019    MCH 29.6 05/30/2019    MCHC 32.7 05/30/2019    RDW 13.2 05/30/2019     05/30/2019       BMP RESULTS:  Lab Results   Component Value Date     05/30/2019    POTASSIUM 4.6 05/30/2019    CHLORIDE 106 05/30/2019    CO2 28 05/30/2019    ANIONGAP 6 05/30/2019     (H) 05/30/2019    BUN 23 05/30/2019    CR 0.97 05/30/2019    GFRESTIMATED 74 05/30/2019    GFRESTBLACK 86 05/30/2019    AWILDA 8.4 (L) 05/30/2019        A1C RESULTS:  Lab Results   Component Value Date    A1C 5.9 04/25/2017       INR RESULTS:  Lab Results   Component Value Date    INR 3.5 (A) 06/25/2019    INR 3.3 (A) 05/21/2019    INR 2.63 (H) 04/15/2019    INR 5.9 07/30/2018           CC  Warren Scales, PA-C  [address] [place] [address]

## 2019-07-11 ENCOUNTER — TELEPHONE (OUTPATIENT)
Dept: PEDIATRICS | Facility: CLINIC | Age: 79
End: 2019-07-11

## 2019-07-11 DIAGNOSIS — I48.91 ATRIAL FIBRILLATION (H): ICD-10-CM

## 2019-07-11 DIAGNOSIS — Z95.2 S/P MITRAL VALVE REPLACEMENT: Primary | ICD-10-CM

## 2019-07-11 DIAGNOSIS — Z95.2 S/P AORTIC VALVE REPLACEMENT: ICD-10-CM

## 2019-07-11 NOTE — TELEPHONE ENCOUNTER
Patient called about INR.  He wasn't sure if he had an appointment to check his INR on 7/19/19 per Dr. Dumas's instruction. His appointment was for 7/18/19 with INR clinic.  No INR Clinic on 7/19/19.   Future INR was ordered and lab only appointment was made with INR result to Dr. Dumas.    He had a pre-op with Dr. Dumas on 7/8/19:  Proposed Surgery: Arthroplasty right knee  Date of Surgery: 7/22/19  Plan: S/p Aortic and mitral replacement. Cardiology is recommending admission 2 days prior to surgery for IV heparin, instead of lovenox. Dr. Santo p .  We will stop his coumadin after a dose on 7/17, check his INR on July 19 to ensure it is not coming down too quickly, then plan to admit on July 20.       Caryn Campos RN

## 2019-07-16 NOTE — TELEPHONE ENCOUNTER
"Pt returned phone call. Spoke with pt.    Stated understanding of plan. Had no further questions.    Yung \"Manny\" JUSTIN Villanueva - Triage  [facility]    "

## 2019-07-16 NOTE — TELEPHONE ENCOUNTER
Please call to ensure that patient is clear on plan for admission to hospital before procedure. I will call to set this up next week.

## 2019-07-16 NOTE — TELEPHONE ENCOUNTER
"Called pt.    Initially spoke with pt and stated that he had two appts for INR, on 07/18/19 and 07/19/19 (as both appts were scheduled). Providing multiple times for the pt to come in. I received clarification from Caryn Campos RN and Dr Dumas, and the pt should NOT be coming in on 07/18/19, but SHOULD be coming on 07/19/19 for INR.    LVM for the pt with these instructions.    Also, if the pt needs more information on their admittance for their surgery, they should be calling the surgeon's office:  Dr Prasanth Jensen, Orthopedics  [facility]  [phone]    Yung \"Manny\" JUSTIN Villanueva - [facility]      "

## 2019-07-17 RX ORDER — CEFAZOLIN SODIUM 1 G/3ML
1 INJECTION, POWDER, FOR SOLUTION INTRAMUSCULAR; INTRAVENOUS SEE ADMIN INSTRUCTIONS
Status: CANCELLED | OUTPATIENT
Start: 2019-07-17

## 2019-07-17 RX ORDER — CEFAZOLIN SODIUM 2 G/100ML
2 INJECTION, SOLUTION INTRAVENOUS
Status: CANCELLED | OUTPATIENT
Start: 2019-07-17

## 2019-07-18 ENCOUNTER — TELEPHONE (OUTPATIENT)
Dept: PEDIATRICS | Facility: CLINIC | Age: 79
End: 2019-07-18

## 2019-07-18 NOTE — TELEPHONE ENCOUNTER
Patient returned call, he was informed of planned admission. Discussed he will present to [facility], main desk at 0800 on 7/20.    Patient in agreement with plan, denies questions/concerns.    Park Rich RN BSN   [facility]

## 2019-07-18 NOTE — TELEPHONE ENCOUNTER
Arranged patient for direct admission on 7/20, should check in at [facility] Main desk at 8 am on 7/20. Please call patient to let him know.    Tu Reyes MD

## 2019-07-18 NOTE — PHARMACY-ADMISSION MEDICATION HISTORY
Admission medication history interview status for this patient is complete. See [facility] admission navigator for allergy information, prior to admission medications and immunization status.     PTA meds completed by pre-admitting nurse Kitty Yang and reviewed by pharmacy       Prior to Admission medications    Medication Sig Last Dose Taking? Auth Provider   aspirin 81 MG EC tablet Take 1 tablet (81 mg) by mouth daily  Yes Calderon Santo MD   furosemide (LASIX) 40 MG tablet Take 0.5 tablets (20 mg) by mouth daily  Yes Tu Reyes MD   HERBALS White willow bark, Turmeric, Albina, botswellia & Yucca mixed powder(1.5 tsp mixed in cranberry juice or orange juice) every day  Yes Tu Reyes MD   mesalamine (ASACOL HD) 800 MG EC tablet Take 1 tablet (800 mg) by mouth 2 times daily  Yes Tu Reyes MD   metoprolol tartrate (LOPRESSOR) 25 MG tablet Take 1 tablet (25 mg) by mouth 2 times daily  Yes Tu Reyes MD   mupirocin (BACTROBAN) 2 % external ointment Apply topically 2 times daily Apply small amount in each nostril twice daily for seven days prior to surgery  Yes Reported, Patient   tamsulosin (FLOMAX) 0.4 MG capsule TAKE 1 CAPSULE BY MOUTH EVERY DAY  Yes Tu Reyes MD   warfarin (COUMADIN) 5 MG tablet Take 5 mg (1 tablet) every Mon, Fri; 7.5 mg (1.5 tablets) all other days or as directed by INR Clinic  Yes Tu Reyes MD   ezetimibe (ZETIA) 10 MG tablet Take 1 tablet (10 mg) by mouth daily   Magdiel Dumas MD   fluocinonide (LIDEX) 0.05 % external ointment Apply topically 2 times daily   Magdiel Dumas MD   rosuvastatin (CRESTOR) 40 MG tablet Take 1 tablet (40 mg) by mouth daily   Magdiel Dumas MD

## 2019-07-18 NOTE — TELEPHONE ENCOUNTER
Telephone call placed to patient, left a voice message with request for return call today.    Park Rich RN BSN   [facility]

## 2019-07-18 NOTE — TELEPHONE ENCOUNTER
Direct Admission Planning Note  This note is designed to facilitate a direct admission for an outpatient. The clinic TC will document the bed requirements before calling Hospital Bed Planners at [phone] ([facility] & [facility]).  If a bed is available, the Outpatient Provider will addend this note with additional documentation (.directadmitproviderdocumentation) before contacting the Hospitalist and Bed Planner. The Hospitalist will addend the note with the details of the Huddle (.directadmithospitalist).    1-Direct Admission Bed Requirements    Patient: Fausto Farr  YOB: 1940  MRN: [id number]  Sex: male   Date of Admission: 7/20/2019  Preferred Hospital: [facility]  Special Needs: Yes: MRSA  Private Room: Yes: MRSA  Patient Delayed Arrival? Yes: Planned admission for heparin drip prior to knee replacement  Transportation Method: private car    2-Outpatient Provider Documentation Prior to Huddle    Requesting Provider: Tu Reyes MD   PCP: Tu Reyes  Diagnosis: mitral valve needs bridge prior to knee replacement  Code Status: Full  Patient Specific Medical Information/Relevant Medical Information Not in Epic:None  Timely Treatments Needed: Yes: heparin drip      Important things to know/address during hospitalization:  Heparin drip prior to knee replacement 7/22 (admit on 7/20)  Behavioral/Family Issues: No  SDoH:

## 2019-07-19 ENCOUNTER — HOSPITAL ENCOUNTER (INPATIENT)
Facility: CLINIC | Age: 79
LOS: 7 days | Discharge: HOME | DRG: 470 | End: 2019-07-26
Attending: INTERNAL MEDICINE | Admitting: INTERNAL MEDICINE
Payer: MEDICARE

## 2019-07-19 DIAGNOSIS — Z96.651 STATUS POST TOTAL RIGHT KNEE REPLACEMENT: Primary | ICD-10-CM

## 2019-07-19 DIAGNOSIS — I48.91 ATRIAL FIBRILLATION (H): ICD-10-CM

## 2019-07-19 DIAGNOSIS — Z95.2 S/P MITRAL VALVE REPLACEMENT: ICD-10-CM

## 2019-07-19 DIAGNOSIS — Z95.2 S/P AORTIC VALVE REPLACEMENT: ICD-10-CM

## 2019-07-19 PROBLEM — G89.29 KNEE PAIN, CHRONIC: Status: ACTIVE | Noted: 2019-07-19

## 2019-07-19 PROBLEM — M25.569 KNEE PAIN, CHRONIC: Status: ACTIVE | Noted: 2019-07-19

## 2019-07-19 LAB
ANION GAP SERPL CALCULATED.3IONS-SCNC: 6 MMOL/L (ref 3–14)
BUN SERPL-MCNC: 19 MG/DL (ref 7–30)
CALCIUM SERPL-MCNC: 8.9 MG/DL (ref 8.5–10.1)
CHLORIDE SERPL-SCNC: 105 MMOL/L (ref 94–109)
CO2 SERPL-SCNC: 26 MMOL/L (ref 20–32)
CREAT SERPL-MCNC: 1.11 MG/DL (ref 0.66–1.25)
ERYTHROCYTE [DISTWIDTH] IN BLOOD BY AUTOMATED COUNT: 12.7 % (ref 10–15)
GFR SERPL CREATININE-BSD FRML MDRD: 63 ML/MIN/{1.73_M2}
GLUCOSE SERPL-MCNC: 98 MG/DL (ref 70–99)
HCT VFR BLD AUTO: 37.7 % (ref 40–53)
HGB BLD-MCNC: 12.1 G/DL (ref 13.3–17.7)
INR PPP: 1.5 (ref 0.86–1.14)
MCH RBC QN AUTO: 29.4 PG (ref 26.5–33)
MCHC RBC AUTO-ENTMCNC: 32.1 G/DL (ref 31.5–36.5)
MCV RBC AUTO: 92 FL (ref 78–100)
PLATELET # BLD AUTO: 271 10E9/L (ref 150–450)
POTASSIUM SERPL-SCNC: 4.1 MMOL/L (ref 3.4–5.3)
RBC # BLD AUTO: 4.11 10E12/L (ref 4.4–5.9)
SODIUM SERPL-SCNC: 137 MMOL/L (ref 133–144)
WBC # BLD AUTO: 8.6 10E9/L (ref 4–11)

## 2019-07-19 PROCEDURE — 85610 PROTHROMBIN TIME: CPT | Performed by: INTERNAL MEDICINE

## 2019-07-19 PROCEDURE — 80048 BASIC METABOLIC PNL TOTAL CA: CPT | Performed by: INTERNAL MEDICINE

## 2019-07-19 PROCEDURE — 25000128 H RX IP 250 OP 636: Performed by: INTERNAL MEDICINE

## 2019-07-19 PROCEDURE — 36415 COLL VENOUS BLD VENIPUNCTURE: CPT | Performed by: INTERNAL MEDICINE

## 2019-07-19 PROCEDURE — 25000132 ZZH RX MED GY IP 250 OP 250 PS 637: Mod: GY | Performed by: INTERNAL MEDICINE

## 2019-07-19 PROCEDURE — 85027 COMPLETE CBC AUTOMATED: CPT | Performed by: INTERNAL MEDICINE

## 2019-07-19 PROCEDURE — 99207 ZZC DOWN CODE DUE TO INITIAL EXAM: CPT | Performed by: INTERNAL MEDICINE

## 2019-07-19 PROCEDURE — 12000000 ZZH R&B MED SURG/OB

## 2019-07-19 PROCEDURE — 99222 1ST HOSP IP/OBS MODERATE 55: CPT | Mod: AI | Performed by: INTERNAL MEDICINE

## 2019-07-19 RX ORDER — EZETIMIBE 10 MG/1
10 TABLET ORAL DAILY
Status: DISCONTINUED | OUTPATIENT
Start: 2019-07-19 | End: 2019-07-22

## 2019-07-19 RX ORDER — MESALAMINE 800 MG/1
800 TABLET, DELAYED RELEASE ORAL 2 TIMES DAILY
Status: DISCONTINUED | OUTPATIENT
Start: 2019-07-19 | End: 2019-07-22

## 2019-07-19 RX ORDER — ACETAMINOPHEN 325 MG/1
650 TABLET ORAL EVERY 4 HOURS PRN
Status: DISCONTINUED | OUTPATIENT
Start: 2019-07-19 | End: 2019-07-22

## 2019-07-19 RX ORDER — TAMSULOSIN HYDROCHLORIDE 0.4 MG/1
0.4 CAPSULE ORAL DAILY
Status: DISCONTINUED | OUTPATIENT
Start: 2019-07-19 | End: 2019-07-22

## 2019-07-19 RX ORDER — FUROSEMIDE 20 MG
20 TABLET ORAL DAILY
Status: DISCONTINUED | OUTPATIENT
Start: 2019-07-20 | End: 2019-07-22

## 2019-07-19 RX ORDER — METOPROLOL TARTRATE 25 MG/1
25 TABLET, FILM COATED ORAL 2 TIMES DAILY
Status: DISCONTINUED | OUTPATIENT
Start: 2019-07-19 | End: 2019-07-22

## 2019-07-19 RX ORDER — LIDOCAINE 40 MG/G
CREAM TOPICAL
Status: DISCONTINUED | OUTPATIENT
Start: 2019-07-19 | End: 2019-07-22

## 2019-07-19 RX ORDER — ROSUVASTATIN CALCIUM 20 MG/1
40 TABLET, COATED ORAL DAILY
Status: DISCONTINUED | OUTPATIENT
Start: 2019-07-19 | End: 2019-07-22

## 2019-07-19 RX ORDER — NALOXONE HYDROCHLORIDE 0.4 MG/ML
.1-.4 INJECTION, SOLUTION INTRAMUSCULAR; INTRAVENOUS; SUBCUTANEOUS
Status: DISCONTINUED | OUTPATIENT
Start: 2019-07-19 | End: 2019-07-22

## 2019-07-19 RX ADMIN — HEPARIN SODIUM 950 UNITS/HR: 10000 INJECTION, SOLUTION INTRAVENOUS at 20:38

## 2019-07-19 RX ADMIN — METOPROLOL TARTRATE 25 MG: 25 TABLET, FILM COATED ORAL at 20:21

## 2019-07-19 RX ADMIN — MESALAMINE 800 MG: 800 TABLET, DELAYED RELEASE ORAL at 20:21

## 2019-07-19 RX ADMIN — EZETIMIBE 10 MG: 10 TABLET ORAL at 20:21

## 2019-07-19 RX ADMIN — ROSUVASTATIN CALCIUM 40 MG: 20 TABLET, FILM COATED ORAL at 20:22

## 2019-07-19 RX ADMIN — TAMSULOSIN HYDROCHLORIDE 0.4 MG: 0.4 CAPSULE ORAL at 20:21

## 2019-07-19 RX ADMIN — Medication 3500 UNITS: at 20:36

## 2019-07-19 ASSESSMENT — ACTIVITIES OF DAILY LIVING (ADL): ADLS_ACUITY_SCORE: 13

## 2019-07-19 NOTE — PHARMACY-ADMISSION MEDICATION HISTORY
Admission medication history interview status for this patient is complete. See [facility] admission navigator for allergy information, prior to admission medications and immunization status.     Medication history interview source(s):Patient  Medication history resources (including written lists, pill bottles, clinic record):None    Changes made to PTA medication list:  Added: none  Deleted: herbals combo  Changed: none    Actions taken by pharmacist (provider contacted, etc):None     Additional medication history information:None    Medication reconciliation/reorder completed by provider prior to medication history? Yes    For patients on insulin therapy: no (Yes/No)   Lantus/levemir/NPH/Mix 70/30 dose: ___ in AM/PM or twice daily   Sliding scale Novolog Y/N   If Yes, do you have a baseline novolog pre-meal dose: ______units with meals   Patients eat three meals a day: Y/N ---  How many episodes of hypoglycemia (low blood glucose) do you have weekly: ---   How many missed doses do you have a week: ---  How many times do you check your blood glucose per day: ---  Any Barriers to therapy: cost of medications/comfortable with giving injections (if applicable)/ comfortable and confident with current diabetes regimen ---      Prior to Admission medications    Medication Sig Last Dose Taking? Auth Provider   aspirin 81 MG EC tablet Take 1 tablet (81 mg) by mouth daily 7/19/2019 at Unknown time Yes Calderon Santo MD   ezetimibe (ZETIA) 10 MG tablet Take 1 tablet (10 mg) by mouth daily 7/18/2019 at pm Yes Magdiel Dumas MD   fluocinonide (LIDEX) 0.05 % external ointment Apply topically 2 times daily prn Yes Magdiel Dumas MD   furosemide (LASIX) 40 MG tablet Take 0.5 tablets (20 mg) by mouth daily 7/19/2019 at am Yes Tu Reyes MD   mesalamine (ASACOL HD) 800 MG EC tablet Take 1 tablet (800 mg) by mouth 2 times daily 7/19/2019 at am Yes Tu Reyes MD   metoprolol tartrate (LOPRESSOR) 25 MG tablet Take 1  tablet (25 mg) by mouth 2 times daily 7/19/2019 at am Yes Tu Reyes MD   mupirocin (BACTROBAN) 2 % external ointment Apply topically 2 times daily Apply small amount in each nostril twice daily for seven days prior to surgery 7/19/2019 at am Yes Reported, Patient   rosuvastatin (CRESTOR) 40 MG tablet Take 1 tablet (40 mg) by mouth daily 7/18/2019 at hs Yes Magdiel Dumas MD   tamsulosin (FLOMAX) 0.4 MG capsule TAKE 1 CAPSULE BY MOUTH EVERY DAY 7/18/2019 at pm Yes Tu Reyes MD   warfarin (COUMADIN) 5 MG tablet Take 5 mg (1 tablet) every Mon, Fri; 7.5 mg (1.5 tablets) all other days or as directed by INR Clinic 7/16/2019 at 7.5mg  Tu Reyes MD

## 2019-07-19 NOTE — H&P
[facility]    History and Physical - Hospitalist Service       Date of Admission:  7/19/2019    Assessment & Plan   Fausto Farr is a 79 year old male admitted on 7/19/2019. He is scheduled for R TKA on 7/22.    1. Scheduled R TKA 7/22.  - Cares per ortho.  - Will start IV heparin and depending on his surgery time the hospitalist team can assist with when to stop.    2. H/O AVR and MVR.  - Subtherapeutic INR.  - Hold coumadin and start IV heparin.    3. H/o HLP.  - Resume home meds.    4. H/o HTN.  - Resume home meds.    5. H/o a flutter.  - s/p ablation.  - place on telemetry.    6. GAGE.  - CPAP at bedtime.           Diet: cardiac.  DVT Prophylaxis: IV heparin.  Lord Catheter: not present  Code Status: Full Code.    Disposition Plan   Expected discharge: 4 - 7 days, recommended to prior living arrangement once adequate pain management/ tolerating PO medications.  Entered: Tarsha Rosenberg MD 07/19/2019, 5:06 PM     The patient's care was discussed with the Patient and Patient's Family.    Tarsha Rosenberg MD  [facility]    ______________________________________________________________________    Chief Complaint     Low INR.    History is obtained from the patient    History of Present Illness   Fausto Farr is a 79 year old male who is slated for R TKA on 7/22 for OA. His coumadin has been on hold since Wednesday. He went to INR clinic and his INR was low and his PCP directed him to come to hospital for admission. He takes coumadin for hx of mechanical AVR with repeat mechanical AVR,  and mechanical MVR. Hx of  A flutter s/p ablation. Hx of GAGE on CPAP, hx of CAD s/p CABG. Stress teat 4/2019 showed no ischemia.     Pt denies any fevers/chills/chest pain/SOB/N/V/BRBPR.    Review of Systems    The 10 point Review of Systems is negative other than noted in the HPI or here.     Past Medical History    I have reviewed this patient's medical history and updated it with pertinent  information if needed.   Past Medical History:   Diagnosis Date     Abdominal pain      Abnormal ECG     RBBB, 1st degree AVB, Left axis deviation     Anemia     currently taking iron     Arrhythmia     pac, pvc     Back pain     since 1980     BPH (benign prostatic hyperplasia)      Bruit      CAD (coronary artery disease)      Cellulitis 10/18/12     Cellulitis 05/2018    GrpB strep LLE cellulitis  negative RACHAEL for veg     Chronic venous insufficiency     bilat lower extremities     Contact dermatitis and other eczema, due to unspecified cause      Diaphragmatic hernia without mention of obstruction or gangrene      Diastolic HF (heart failure) (H)      Gastric ulcer      Glucose intolerance (impaired glucose tolerance)      Heart murmur 9/16/13    valvular heart disease     Hyperlipidemia LDL goal <100 8/6/2013     Hypertension 8/6/13     Lumbago      Malaise and fatigue      Metabolic syndrome      Mobitz (type) I (Wenckebach's) atrioventricular block     and RBBB     Nocturia 10/18/12     Nonallopathic lesion of cervical region      Nonallopathic lesion of lumbar region      Nonallopathic lesion of pelvic region, not elsewhere classified      Nonallopathic lesion of rib cage      Nonallopathic lesion of sacral region      GAGE (obstructive sleep apnea)     CPAP     Paroxysmal atrial fibrillation (H) 10/18/12     Prostate cancer (H) 2008    radiation seed, XRT      PVD (peripheral vascular disease) (H)      RBBB     1st degree AVB, RBBB, LAHB     Rotator cuff strain     and sprain     S/P AAA repair      S/P aortic valve replacement 2006    developed perivalve leak and MS, therefore redo surg 2013     S/P CABG (coronary artery bypass graft) 2006    Lima-Lad, RA-Rca     Sciatica of left side     since 2000     Sepsis due to group B Streptococcus (H) 5/19/2018     Ulcerative colitis (H)      Varicose veins of bilateral lower extremities with other complications     s/p RLE vein stripping     Vitamin D deficiency         Past Surgical History   I have reviewed this patient's surgical history and updated it with pertinent information if needed.  Past Surgical History:   Procedure Laterality Date     AORTIC VALVE REPLACEMENT  1/3/06    redo AVR SJM 21mm and M MVR 27mm in 2013SJM 21([id number]):AVR, SJM 27 :MVR-     ARTHROPLASTY KNEE      right knee     BACK SURGERY  Oct 2015    Fusion L4-5, laminectomy L2, L3     BYPASS GRAFT ARTERY CORONARY  10/2013    reimplantation of radial artery graft to RCA     C CABG, VEIN, TWO  1/3/06    Left radial to RCA, LIMA to LAD (RA to RCA reimplanted at time of 2013 surg)     CARDIAC CATHERIZATION  11/2005    Stent placed to RCA     CARDIAC CATHERIZATION  04/2013    Occluded RCA, patent LIMA to LAD and radial graft to PDA     CARPAL TUNNEL RELEASE RT/LT  1994     COLONOSCOPY  8-22-11     CYSTOSCOPY FLEXIBLE  10/16/2013    Procedure: CYSTOSCOPY FLEXIBLE;  FLEXIBLE CYSTOSCOPY / DILATION OF URETHRA / INSERTION OF LESLIE;  Surgeon: Cooper Wallace MD;  Location:  OR     ENDOVASCULAR REPAIR, INFRARENAL ABDOMINAL AORTIC ANEURYSM/DISSECTION; MODULAR BIFURCATED PROSTHESIS  2006    AAA repair endovascular     ENT SURGERY       EP ABLATION ATRIAL FLUTTER N/A 4/22/2019    Procedure: EP Ablation Atrial Flutter;  Surgeon: Jessy Vasquez MD;  Location:  HEART CARDIAC CATH LAB     GENITOURINARY SURGERY  6/16/08    radioactive seeding     HEAD & NECK SURGERY  1997    vocal cord polypectomy     KNEE SURGERY  2001 Right knee arthroscopy     OPTICAL TRACKING SYSTEM FUSION SPINE POSTERIOR LUMBAR THREE+ LEVELS N/A 10/29/2015    Procedure: OPTICAL TRACKING SYSTEM FUSION SPINE POSTERIOR LUMBAR THREE+ LEVELS;  Surgeon: Walt Garcia MD;  Location:  OR     PROSTATE SURGERY      radioactive seeding 6/16/08     REPAIR ANEURYSM ABDOMINAL AORTA  06/08     REPAIR VALVE MITRAL  10/16/2013    [facility] 21(AGFN [id number]):AVR, SJM 27 :MVRProcedure: REPAIR VALVE MITRAL;  REDO STERNOTOMY/REDO AORTIC VALVE  REPLACEMENT/ MITRAL VALVE REPLACEMENT/REIMPLANTATION OF RIGHT CORONARY ARTERY BYPASS WITH RACHAEL ( ON PUMP);  Surgeon: Viet Singh MD;  Location: [facility] OR     REPLACE VALVE AORTIC  10/16/2013    Procedure: REPLACE VALVE AORTIC;;  Surgeon: Viet Singh MD;  Location: [facility] OR     SURGERY GENERAL IP CONSULT  2008 Excision aneurysm abdominal aorta     SURGERY GENERAL IP CONSULT   Vocal cord polypectomy     VASCULAR SURGERY  ,      varicose vein stripping       Social History   I have reviewed this patient's social history and updated it with pertinent information if needed.  Social History     Tobacco Use     Smoking status: Former Smoker     Packs/day: 1.00     Years: 40.00     Pack years: 40.00     Start date: 4/15/1962     Last attempt to quit: 10/23/2002     Years since quittin.7     Smokeless tobacco: Never Used   Substance Use Topics     Alcohol use: Yes     Comment: a couple beers per week (socially)     Drug use: No       Family History   I have reviewed this patient's family history and updated it with pertinent information if needed.   Family History   Problem Relation Age of Onset     Coronary Artery Disease Father         CABG     Heart Disease Father         Pacemaker     Other Cancer Daughter      Heart Disease Brother      Other - See Comments Grandchild        Prior to Admission Medications   Prior to Admission Medications   Prescriptions Last Dose Informant Patient Reported? Taking?   HERBALS   Yes No   Sig: White willow bark, Turmeric, Albina, botswellia & Yucca mixed powder(1.5 tsp mixed in cranberry juice or orange juice) every day   aspirin 81 MG EC tablet   Yes No   Sig: Take 1 tablet (81 mg) by mouth daily   ezetimibe (ZETIA) 10 MG tablet   No No   Sig: Take 1 tablet (10 mg) by mouth daily   fluocinonide (LIDEX) 0.05 % external ointment   No No   Sig: Apply topically 2 times daily   furosemide (LASIX) 40 MG tablet   No No   Sig: Take 0.5 tablets (20 mg) by  mouth daily   mesalamine (ASACOL HD) 800 MG EC tablet   No No   Sig: Take 1 tablet (800 mg) by mouth 2 times daily   metoprolol tartrate (LOPRESSOR) 25 MG tablet   No No   Sig: Take 1 tablet (25 mg) by mouth 2 times daily   mupirocin (BACTROBAN) 2 % external ointment   Yes No   Sig: Apply topically 2 times daily Apply small amount in each nostril twice daily for seven days prior to surgery   rosuvastatin (CRESTOR) 40 MG tablet   No No   Sig: Take 1 tablet (40 mg) by mouth daily   tamsulosin (FLOMAX) 0.4 MG capsule   No No   Sig: TAKE 1 CAPSULE BY MOUTH EVERY DAY   warfarin (COUMADIN) 5 MG tablet   No No   Sig: Take 5 mg (1 tablet) every Mon, Fri; 7.5 mg (1.5 tablets) all other days or as directed by INR Clinic      Facility-Administered Medications: None     Allergies   Allergies   Allergen Reactions     Bees Anaphylaxis       Physical Exam   Vital Signs: Temp: 96.8  F (36  C) Temp src: Oral BP: 134/60     Resp: 16 SpO2: 96 % O2 Device: None (Room air)    Weight: 0 lbs 0 oz    Gen - AAO x 3 in NAD.  Lungs - CTA B.  Heart - RR,S1+S2 nml, + metallic clicks. No m/gr.  Abd - soft, NT, ND, + BS.  Ext - trace edema, L >R which is chronic.    Data   Data reviewed today: I reviewed all medications, new labs and imaging results over the last 24 hours. I personally reviewed no images or EKG's today.    No results found for this or any previous visit (from the past 24 hour(s)).

## 2019-07-20 LAB — LMWH PPP CHRO-ACNC: 0.16 IU/ML

## 2019-07-20 PROCEDURE — 85520 HEPARIN ASSAY: CPT | Performed by: INTERNAL MEDICINE

## 2019-07-20 PROCEDURE — 99232 SBSQ HOSP IP/OBS MODERATE 35: CPT | Performed by: INTERNAL MEDICINE

## 2019-07-20 PROCEDURE — 25000128 H RX IP 250 OP 636: Performed by: INTERNAL MEDICINE

## 2019-07-20 PROCEDURE — 25000132 ZZH RX MED GY IP 250 OP 250 PS 637: Mod: GY | Performed by: INTERNAL MEDICINE

## 2019-07-20 PROCEDURE — 12000000 ZZH R&B MED SURG/OB

## 2019-07-20 PROCEDURE — 36415 COLL VENOUS BLD VENIPUNCTURE: CPT | Performed by: INTERNAL MEDICINE

## 2019-07-20 RX ADMIN — HEPARIN SODIUM 950 UNITS/HR: 10000 INJECTION, SOLUTION INTRAVENOUS at 09:00

## 2019-07-20 RX ADMIN — MESALAMINE 800 MG: 800 TABLET, DELAYED RELEASE ORAL at 20:34

## 2019-07-20 RX ADMIN — HEPARIN SODIUM 950 UNITS/HR: 10000 INJECTION, SOLUTION INTRAVENOUS at 16:53

## 2019-07-20 RX ADMIN — ROSUVASTATIN CALCIUM 40 MG: 20 TABLET, FILM COATED ORAL at 20:35

## 2019-07-20 RX ADMIN — METOPROLOL TARTRATE 25 MG: 25 TABLET, FILM COATED ORAL at 09:19

## 2019-07-20 RX ADMIN — METOPROLOL TARTRATE 25 MG: 25 TABLET, FILM COATED ORAL at 20:35

## 2019-07-20 RX ADMIN — MESALAMINE 800 MG: 800 TABLET, DELAYED RELEASE ORAL at 09:19

## 2019-07-20 RX ADMIN — FUROSEMIDE 20 MG: 20 TABLET ORAL at 09:19

## 2019-07-20 RX ADMIN — EZETIMIBE 10 MG: 10 TABLET ORAL at 20:33

## 2019-07-20 RX ADMIN — TAMSULOSIN HYDROCHLORIDE 0.4 MG: 0.4 CAPSULE ORAL at 20:36

## 2019-07-20 ASSESSMENT — ACTIVITIES OF DAILY LIVING (ADL)
ADLS_ACUITY_SCORE: 11

## 2019-07-20 NOTE — PROGRESS NOTES
[facility]    Orthopedics Note    Chart reviewed.    Fausto Farr is a 79 year old male admitted for anticoagulation due to low INR and comorbid conditions.    Indicated for TKA previously by Dr. Jensen.    Scheduled for OR 7/21/2019.    Changed patient to IP status with OR    Dr. Jensen aware of admission.    Please call with questions.    Best Adams MD  Orthopedic Trauma and Arthroplasty  [facility]  [phone]

## 2019-07-20 NOTE — PLAN OF CARE
Pt arrived to floor @ 1600. Oriented to room and call light. Pt is A&O x4. VSS. On tele. Lungs clear. CMS intact. +pulses. Heparin infusing. Up ind in room. Voiding adequately. Low fat/Low NA/ no caffeine diet. Denies pain. Will continue to monitor.

## 2019-07-20 NOTE — PROGRESS NOTES
[facility]  Hospitalist Progress Note  Patient Name: Fausto Farr    MRN: [id number]  Provider: Eddi Contreras MD  07/20/19    Initial presenting complaint/issue to hospital (Diagnosis): prosthetic heart valves, on warfarin, with TKA planned for Monday         Assessment and Plan:      Fausto Farr is a 79 year old male with history of mechanical aortic valve replacement, repeat mechanical aortic valve replacement, mechanical mitral valve replacement, atrial flutter s/p ablation procedure, history of stroke, obstructive sleep apnea for which he uses CPAP with sleep, coronary artery disease with previous CABG, and stress test in 4/2019 which showed no ischemia. He stopped his coumadin on 7/17 in anticipation of fight total knee arthroplasty on 7/22. Given his high risk of thrombotic event the plan was for him to be admitted for heparin drip once his INR was below 2. He went to the INR clninc on 7/19/19 for INR check and was found to have an INR of 1.5. Direct admission for bridging heparin drip until knee surgery was arranged.      Problem list:    1. Right total knee arthroplasty scheduled for 7/22.    2. History of mechanical mitral and aortic valve replacements for which Ralph is chronically anticoagulated with Coumadin with goal INR Of 2.5-3.5. He was admitted from INR clinic with INR of 1.5 for bridging anticoagulation with Heparin drip. This was recommended by Ralph's Cardiologist (Geovanny Fong) given his high risk of thrombotic event off of anticoagulation or even with Lovenox bridging therapy.  Continue Heparin drip until 2 hours prior to surgery. Resume Heparin drip without bolus as soon after surgery as thought to be safe by Orthopedics.      3. Hypercholesterolemia. Continue PTA Zetia and Crestor.     4. Hypertension. Continue PTA Metoprolol with hold parameters.      5. History of atrial flutter with previous ablation procedure Continue PTA Metoprolol. On heparin drip while  Coumadin on hold for surgery. OK to stop telemetry.     6. Obstructive sleep apnea. Continue CPAP with sleep.           Diet: I changed cardiac diet to regular per patient request  DVT Prophylaxis: IV heparin.  Lord Catheter: not present  Code Status: Full Code.  Disposition: Continue inpatient cares. Likely here for 5-6 days or so (will need bridging treatment with Heparin until INR is > 2-2.5 per Dr. Fong)           Interval History:      No new problems. Feels well. No bleeding. Requests that diet be changed to Regular.                  Physical Exam:      Last Vital Signs:  /66   Pulse 64   Temp 95.3  F (35.2  C)   Resp 16   Wt 102.2 kg (225 lb 3.2 oz)   SpO2 95%   BMI 36.35 kg/m    No intake or output data in the 24 hours ending 07/20/19 1106    GENERAL:  Comfortable. Cooperative.  PSYCH: pleasant, oriented, No acute distress.  EYES: PERRLA, Normal conjunctiva.  HEART:  Regular rate and rhythm. No JVD. Pulses normal. No edema.  LUNGS:  Clear to auscultation, normal Respiratory effort.  ABDOMEN:  Soft, no hepatosplenomegaly, normal bowel sounds.  EXTREMETIES: No clubbing, cyanosis or ischemia  SKIN:  Dry to touch, No rash.           Medications:      All current medications were reviewed.         Data:      All new lab and imaging data was reviewed.   Labs:  No results for input(s): CULT in the last 168 hours.       Lab Results   Component Value Date     07/19/2019     05/30/2019     04/22/2019    Lab Results   Component Value Date    CHLORIDE 105 07/19/2019    CHLORIDE 106 05/30/2019    CHLORIDE 107 04/22/2019    Lab Results   Component Value Date    BUN 19 07/19/2019    BUN 23 05/30/2019    BUN 17 04/22/2019      Lab Results   Component Value Date    POTASSIUM 4.1 07/19/2019    POTASSIUM 4.6 05/30/2019    POTASSIUM 4.1 04/22/2019    Lab Results   Component Value Date    CO2 26 07/19/2019    CO2 28 05/30/2019    CO2 26 04/22/2019    Lab Results   Component Value Date    CR 1.11  07/19/2019    CR 0.97 05/30/2019    CR 0.94 04/22/2019        Recent Labs   Lab 07/19/19  1751   WBC 8.6   HGB 12.1*   HCT 37.7*   MCV 92

## 2019-07-20 NOTE — PLAN OF CARE
Pt is up independently . Pt has a heparin drip infusing at 9.5 ml an hour.  Pt denies any pain , voiding and tolerating diet.

## 2019-07-21 LAB — LMWH PPP CHRO-ACNC: 0.16 IU/ML

## 2019-07-21 PROCEDURE — 25000128 H RX IP 250 OP 636: Performed by: INTERNAL MEDICINE

## 2019-07-21 PROCEDURE — 12000000 ZZH R&B MED SURG/OB

## 2019-07-21 PROCEDURE — 25000132 ZZH RX MED GY IP 250 OP 250 PS 637: Mod: GY | Performed by: INTERNAL MEDICINE

## 2019-07-21 PROCEDURE — 99232 SBSQ HOSP IP/OBS MODERATE 35: CPT | Performed by: INTERNAL MEDICINE

## 2019-07-21 PROCEDURE — 36415 COLL VENOUS BLD VENIPUNCTURE: CPT | Performed by: INTERNAL MEDICINE

## 2019-07-21 PROCEDURE — 85520 HEPARIN ASSAY: CPT | Performed by: INTERNAL MEDICINE

## 2019-07-21 RX ADMIN — FUROSEMIDE 20 MG: 20 TABLET ORAL at 09:10

## 2019-07-21 RX ADMIN — MESALAMINE 800 MG: 800 TABLET, DELAYED RELEASE ORAL at 09:10

## 2019-07-21 RX ADMIN — METOPROLOL TARTRATE 25 MG: 25 TABLET, FILM COATED ORAL at 20:19

## 2019-07-21 RX ADMIN — TAMSULOSIN HYDROCHLORIDE 0.4 MG: 0.4 CAPSULE ORAL at 20:19

## 2019-07-21 RX ADMIN — MESALAMINE 800 MG: 800 TABLET, DELAYED RELEASE ORAL at 20:18

## 2019-07-21 RX ADMIN — EZETIMIBE 10 MG: 10 TABLET ORAL at 20:19

## 2019-07-21 RX ADMIN — METOPROLOL TARTRATE 25 MG: 25 TABLET, FILM COATED ORAL at 09:10

## 2019-07-21 RX ADMIN — HEPARIN SODIUM 950 UNITS/HR: 10000 INJECTION, SOLUTION INTRAVENOUS at 17:48

## 2019-07-21 RX ADMIN — ROSUVASTATIN CALCIUM 40 MG: 20 TABLET, FILM COATED ORAL at 20:18

## 2019-07-21 ASSESSMENT — ACTIVITIES OF DAILY LIVING (ADL)
ADLS_ACUITY_SCORE: 11

## 2019-07-21 NOTE — PLAN OF CARE
Independent in room. Denies any pain. Heparin gtt continues. Voiding appropriately with active bowel sounds. Soft BP's although asymptomatic. Continues surgical scrubs in prep for LTKA tomorrow.

## 2019-07-21 NOTE — PLAN OF CARE
Patient remain on heparin drip. Hep Xa level 0.16; pharmacy to dose. Pt up ad claudette. Denies pain. Tolerating diet. NPO after midnight. Heparin to be stopped 2 hours prior to procedure tomorrow.

## 2019-07-21 NOTE — PLAN OF CARE
Vital signs stable.Alert and oriented x4,  Lungs clear, encouraged inspirometer  use.pre and post op education done with pt and spouse.  Bowel sounds hypoactive, voiding.  Heparin infusing at 950 units per hr,Inr 1.50.  CMS intact.  Denies need for pain medication.

## 2019-07-21 NOTE — PROGRESS NOTES
[facility]  Hospitalist Progress Note  Patient Name: Fausto Farr    MRN: [id number]  Provider: Eddi Contreras MD  07/21/19    Initial presenting complaint/issue to hospital (Diagnosis): TKA Monday         Assessment and Plan:      Fausto Farr is a 79 year old male with history of mechanical aortic valve replacement, repeat mechanical aortic valve replacement, mechanical mitral valve replacement, atrial flutter s/p ablation procedure, history of stroke, obstructive sleep apnea for which he uses CPAP with sleep, coronary artery disease with previous CABG, and stress test in 4/2019 which showed no ischemia. He stopped his coumadin on 7/17 in anticipation of fight total knee arthroplasty on 7/22. Given his high risk of thrombotic event the plan was for him to be admitted for heparin drip once his INR was below 2. He went to the INR clninc on 7/19/19 for INR check and was found to have an INR of 1.5. Direct admission for bridging heparin drip until knee surgery was arranged.       Problem list:     1. Right total knee arthroplasty scheduled for 7/22.     2. History of mechanical mitral and aortic valve replacements for which Ralph is chronically anticoagulated with Coumadin with goal INR Of 2.5-3.5. He was admitted from INR clinic with INR of 1.5 for bridging anticoagulation with Heparin drip. This was recommended by Ralph's Cardiologist (Geovanny Fong) given his high risk of thrombotic event off of anticoagulation or even with Lovenox bridging therapy.  Continue Heparin drip until 2 hours prior to surgery. Resume Heparin drip without bolus as soon after surgery as thought to be safe by Orthopedics.      3. Hypercholesterolemia. Continue PTA Zetia and Crestor.     4. Hypertension. Continue PTA Metoprolol with hold parameters.      5. History of atrial flutter with previous ablation procedure Continue PTA Metoprolol. On heparin drip while Coumadin on hold for surgery. OK to stop telemetry.     6.  Obstructive sleep apnea. Continue CPAP with sleep.            Diet: I changed cardiac diet to regular per patient request  DVT Prophylaxis: IV heparin.  Lord Catheter: not present  Code Status: Full Code.  Disposition: Continue inpatient cares. Likely here for 4-5 days or so (will need bridging treatment with Heparin until INR is > 2-2.5 per Dr. Fong)        Interval History:      No new problems.                   Physical Exam:      Last Vital Signs:  /56 (BP Location: Right arm)   Pulse 51   Temp 96.2  F (35.7  C) (Oral)   Resp 18   Wt 102.2 kg (225 lb 3.2 oz)   SpO2 94%   BMI 36.35 kg/m      Intake/Output Summary (Last 24 hours) at 7/21/2019 1314  Last data filed at 7/21/2019 1216  Gross per 24 hour   Intake 2344.3 ml   Output --   Net 2344.3 ml       GENERAL:  Comfortable. Cooperative.  PSYCH: pleasant, oriented, No acute distress.  EYES: PERRLA, Normal conjunctiva.  HEART:  Regular rate and rhythm. No JVD. Pulses normal. No edema.  LUNGS:  Clear to auscultation, normal Respiratory effort.  ABDOMEN:  Soft, no hepatosplenomegaly, normal bowel sounds.  EXTREMETIES: No clubbing, cyanosis or ischemia  SKIN:  Dry to touch, No rash.           Medications:      All current medications were reviewed.         Data:      All new lab and imaging data was reviewed.   Labs:       Lab Results   Component Value Date     07/19/2019     05/30/2019     04/22/2019    Lab Results   Component Value Date    CHLORIDE 105 07/19/2019    CHLORIDE 106 05/30/2019    CHLORIDE 107 04/22/2019    Lab Results   Component Value Date    BUN 19 07/19/2019    BUN 23 05/30/2019    BUN 17 04/22/2019      Lab Results   Component Value Date    POTASSIUM 4.1 07/19/2019    POTASSIUM 4.6 05/30/2019    POTASSIUM 4.1 04/22/2019    Lab Results   Component Value Date    CO2 26 07/19/2019    CO2 28 05/30/2019    CO2 26 04/22/2019    Lab Results   Component Value Date    CR 1.11 07/19/2019    CR 0.97 05/30/2019    CR 0.94  04/22/2019        Recent Labs   Lab 07/19/19  1751   WBC 8.6   HGB 12.1*   HCT 37.7*   MCV 92        Recent Labs   Lab 07/19/19  0956   INR 1.50*

## 2019-07-22 ENCOUNTER — APPOINTMENT (OUTPATIENT)
Dept: GENERAL RADIOLOGY | Facility: CLINIC | Age: 79
DRG: 470 | End: 2019-07-22
Attending: ORTHOPAEDIC SURGERY
Payer: MEDICARE

## 2019-07-22 ENCOUNTER — ANESTHESIA (OUTPATIENT)
Dept: SURGERY | Facility: CLINIC | Age: 79
DRG: 470 | End: 2019-07-22
Payer: MEDICARE

## 2019-07-22 ENCOUNTER — APPOINTMENT (OUTPATIENT)
Dept: PHYSICAL THERAPY | Facility: CLINIC | Age: 79
DRG: 470 | End: 2019-07-22
Attending: INTERNAL MEDICINE
Payer: MEDICARE

## 2019-07-22 ENCOUNTER — ANESTHESIA EVENT (OUTPATIENT)
Dept: SURGERY | Facility: CLINIC | Age: 79
DRG: 470 | End: 2019-07-22
Payer: MEDICARE

## 2019-07-22 PROBLEM — Z96.659 TOTAL KNEE REPLACEMENT STATUS: Status: ACTIVE | Noted: 2019-07-22

## 2019-07-22 LAB
ERYTHROCYTE [DISTWIDTH] IN BLOOD BY AUTOMATED COUNT: 12.8 % (ref 10–15)
HCT VFR BLD AUTO: 42.8 % (ref 40–53)
HGB BLD-MCNC: 13.9 G/DL (ref 13.3–17.7)
INR PPP: 1.07 (ref 0.86–1.14)
LMWH PPP CHRO-ACNC: <0.1 IU/ML
LMWH PPP CHRO-ACNC: <0.1 IU/ML
MCH RBC QN AUTO: 29.7 PG (ref 26.5–33)
MCHC RBC AUTO-ENTMCNC: 32.5 G/DL (ref 31.5–36.5)
MCV RBC AUTO: 92 FL (ref 78–100)
PLATELET # BLD AUTO: 287 10E9/L (ref 150–450)
RBC # BLD AUTO: 4.68 10E12/L (ref 4.4–5.9)
WBC # BLD AUTO: 7.2 10E9/L (ref 4–11)

## 2019-07-22 PROCEDURE — 36000093 ZZH SURGERY LEVEL 4 1ST 30 MIN: Performed by: ORTHOPAEDIC SURGERY

## 2019-07-22 PROCEDURE — 25000125 ZZHC RX 250: Performed by: ANESTHESIOLOGY

## 2019-07-22 PROCEDURE — 97110 THERAPEUTIC EXERCISES: CPT | Mod: GP

## 2019-07-22 PROCEDURE — 99232 SBSQ HOSP IP/OBS MODERATE 35: CPT | Performed by: INTERNAL MEDICINE

## 2019-07-22 PROCEDURE — 25000128 H RX IP 250 OP 636: Performed by: INTERNAL MEDICINE

## 2019-07-22 PROCEDURE — 25800030 ZZH RX IP 258 OP 636: Performed by: ORTHOPAEDIC SURGERY

## 2019-07-22 PROCEDURE — 97116 GAIT TRAINING THERAPY: CPT | Mod: GP

## 2019-07-22 PROCEDURE — 25000128 H RX IP 250 OP 636

## 2019-07-22 PROCEDURE — 36415 COLL VENOUS BLD VENIPUNCTURE: CPT | Performed by: INTERNAL MEDICINE

## 2019-07-22 PROCEDURE — C1776 JOINT DEVICE (IMPLANTABLE): HCPCS | Performed by: ORTHOPAEDIC SURGERY

## 2019-07-22 PROCEDURE — 0SRC0J9 REPLACEMENT OF RIGHT KNEE JOINT WITH SYNTHETIC SUBSTITUTE, CEMENTED, OPEN APPROACH: ICD-10-PCS | Performed by: ORTHOPAEDIC SURGERY

## 2019-07-22 PROCEDURE — 25000132 ZZH RX MED GY IP 250 OP 250 PS 637: Mod: GY | Performed by: INTERNAL MEDICINE

## 2019-07-22 PROCEDURE — 71000012 ZZH RECOVERY PHASE 1 LEVEL 1 FIRST HR: Performed by: ORTHOPAEDIC SURGERY

## 2019-07-22 PROCEDURE — 71000013 ZZH RECOVERY PHASE 1 LEVEL 1 EA ADDTL HR: Performed by: ORTHOPAEDIC SURGERY

## 2019-07-22 PROCEDURE — 85027 COMPLETE CBC AUTOMATED: CPT | Performed by: INTERNAL MEDICINE

## 2019-07-22 PROCEDURE — 27810169 ZZH OR IMPLANT GENERAL: Performed by: ORTHOPAEDIC SURGERY

## 2019-07-22 PROCEDURE — 97161 PT EVAL LOW COMPLEX 20 MIN: CPT | Mod: GP

## 2019-07-22 PROCEDURE — 25000128 H RX IP 250 OP 636: Performed by: ANESTHESIOLOGY

## 2019-07-22 PROCEDURE — 36000063 ZZH SURGERY LEVEL 4 EA 15 ADDTL MIN: Performed by: ORTHOPAEDIC SURGERY

## 2019-07-22 PROCEDURE — 27210794 ZZH OR GENERAL SUPPLY STERILE: Performed by: ORTHOPAEDIC SURGERY

## 2019-07-22 PROCEDURE — 25000128 H RX IP 250 OP 636: Performed by: NURSE ANESTHETIST, CERTIFIED REGISTERED

## 2019-07-22 PROCEDURE — 37000009 ZZH ANESTHESIA TECHNICAL FEE, EACH ADDTL 15 MIN: Performed by: ORTHOPAEDIC SURGERY

## 2019-07-22 PROCEDURE — 12000000 ZZH R&B MED SURG/OB

## 2019-07-22 PROCEDURE — 40000171 ZZH STATISTIC PRE-PROCEDURE ASSESSMENT III: Performed by: ORTHOPAEDIC SURGERY

## 2019-07-22 PROCEDURE — 85520 HEPARIN ASSAY: CPT | Performed by: INTERNAL MEDICINE

## 2019-07-22 PROCEDURE — 85610 PROTHROMBIN TIME: CPT | Performed by: INTERNAL MEDICINE

## 2019-07-22 PROCEDURE — 40000986 XR KNEE PORT RT 1/2 VW: Mod: RT

## 2019-07-22 PROCEDURE — 25800030 ZZH RX IP 258 OP 636: Performed by: NURSE ANESTHETIST, CERTIFIED REGISTERED

## 2019-07-22 PROCEDURE — 25000125 ZZHC RX 250: Performed by: NURSE ANESTHETIST, CERTIFIED REGISTERED

## 2019-07-22 PROCEDURE — 25800025 ZZH RX 258: Performed by: INTERNAL MEDICINE

## 2019-07-22 PROCEDURE — 25000132 ZZH RX MED GY IP 250 OP 250 PS 637: Mod: GY | Performed by: ORTHOPAEDIC SURGERY

## 2019-07-22 PROCEDURE — 40000275 ZZH STATISTIC RCP TIME EA 10 MIN

## 2019-07-22 PROCEDURE — 37000008 ZZH ANESTHESIA TECHNICAL FEE, 1ST 30 MIN: Performed by: ORTHOPAEDIC SURGERY

## 2019-07-22 PROCEDURE — 97530 THERAPEUTIC ACTIVITIES: CPT | Mod: GP

## 2019-07-22 PROCEDURE — 25800030 ZZH RX IP 258 OP 636: Performed by: ANESTHESIOLOGY

## 2019-07-22 PROCEDURE — 40000556 ZZH STATISTIC PERIPHERAL IV START W US GUIDANCE

## 2019-07-22 PROCEDURE — 25000128 H RX IP 250 OP 636: Performed by: ORTHOPAEDIC SURGERY

## 2019-07-22 DEVICE — IMPLANTABLE DEVICE: Type: IMPLANTABLE DEVICE | Site: KNEE | Status: FUNCTIONAL

## 2019-07-22 DEVICE — BONE CEMENT STRK SIMPLEX P SPEEDSET 6192-1-001: Type: IMPLANTABLE DEVICE | Status: FUNCTIONAL

## 2019-07-22 DEVICE — IMP COMP TIBIAL TRAY SNN JOURNEY NP RT SZ 5 74022215: Type: IMPLANTABLE DEVICE | Site: KNEE | Status: FUNCTIONAL

## 2019-07-22 DEVICE — IMP COMP PATELLA SNR GENESIS II 9X32MM 71420576: Type: IMPLANTABLE DEVICE | Site: KNEE | Status: FUNCTIONAL

## 2019-07-22 RX ORDER — CEFAZOLIN SODIUM 2 G/100ML
2 INJECTION, SOLUTION INTRAVENOUS
Status: DISCONTINUED | OUTPATIENT
Start: 2019-07-22 | End: 2019-07-22 | Stop reason: HOSPADM

## 2019-07-22 RX ORDER — ROSUVASTATIN CALCIUM 20 MG/1
40 TABLET, COATED ORAL AT BEDTIME
Status: DISCONTINUED | OUTPATIENT
Start: 2019-07-22 | End: 2019-07-26 | Stop reason: HOSPADM

## 2019-07-22 RX ORDER — ACETAMINOPHEN 325 MG/1
975 TABLET ORAL EVERY 8 HOURS
Status: COMPLETED | OUTPATIENT
Start: 2019-07-22 | End: 2019-07-25

## 2019-07-22 RX ORDER — AMOXICILLIN 250 MG
2 CAPSULE ORAL 2 TIMES DAILY
Status: DISCONTINUED | OUTPATIENT
Start: 2019-07-22 | End: 2019-07-26 | Stop reason: HOSPADM

## 2019-07-22 RX ORDER — ACETAMINOPHEN 325 MG/1
650 TABLET ORAL EVERY 4 HOURS PRN
Status: DISCONTINUED | OUTPATIENT
Start: 2019-07-25 | End: 2019-07-26 | Stop reason: HOSPADM

## 2019-07-22 RX ORDER — ONDANSETRON 2 MG/ML
4 INJECTION INTRAMUSCULAR; INTRAVENOUS EVERY 30 MIN PRN
Status: DISCONTINUED | OUTPATIENT
Start: 2019-07-22 | End: 2019-07-22 | Stop reason: HOSPADM

## 2019-07-22 RX ORDER — NALOXONE HYDROCHLORIDE 0.4 MG/ML
.1-.4 INJECTION, SOLUTION INTRAMUSCULAR; INTRAVENOUS; SUBCUTANEOUS
Status: DISCONTINUED | OUTPATIENT
Start: 2019-07-22 | End: 2019-07-26 | Stop reason: HOSPADM

## 2019-07-22 RX ORDER — KETOROLAC TROMETHAMINE 15 MG/ML
15 INJECTION, SOLUTION INTRAMUSCULAR; INTRAVENOUS EVERY 6 HOURS
Status: COMPLETED | OUTPATIENT
Start: 2019-07-22 | End: 2019-07-23

## 2019-07-22 RX ORDER — ONDANSETRON 2 MG/ML
4 INJECTION INTRAMUSCULAR; INTRAVENOUS EVERY 6 HOURS PRN
Status: DISCONTINUED | OUTPATIENT
Start: 2019-07-22 | End: 2019-07-26 | Stop reason: HOSPADM

## 2019-07-22 RX ORDER — PROPOFOL 10 MG/ML
INJECTION, EMULSION INTRAVENOUS PRN
Status: DISCONTINUED | OUTPATIENT
Start: 2019-07-22 | End: 2019-07-22

## 2019-07-22 RX ORDER — CEFAZOLIN SODIUM 1 G/3ML
1 INJECTION, POWDER, FOR SOLUTION INTRAMUSCULAR; INTRAVENOUS SEE ADMIN INSTRUCTIONS
Status: DISCONTINUED | OUTPATIENT
Start: 2019-07-22 | End: 2019-07-22 | Stop reason: HOSPADM

## 2019-07-22 RX ORDER — OXYCODONE HYDROCHLORIDE 5 MG/1
5-10 TABLET ORAL EVERY 4 HOURS PRN
Status: DISCONTINUED | OUTPATIENT
Start: 2019-07-22 | End: 2019-07-23

## 2019-07-22 RX ORDER — LIDOCAINE HYDROCHLORIDE 10 MG/ML
INJECTION, SOLUTION INFILTRATION; PERINEURAL PRN
Status: DISCONTINUED | OUTPATIENT
Start: 2019-07-22 | End: 2019-07-22

## 2019-07-22 RX ORDER — NEOSTIGMINE METHYLSULFATE 1 MG/ML
VIAL (ML) INJECTION PRN
Status: DISCONTINUED | OUTPATIENT
Start: 2019-07-22 | End: 2019-07-22

## 2019-07-22 RX ORDER — ROPIVACAINE HYDROCHLORIDE 7.5 MG/ML
INJECTION, SOLUTION EPIDURAL; PERINEURAL PRN
Status: DISCONTINUED | OUTPATIENT
Start: 2019-07-22 | End: 2019-07-22

## 2019-07-22 RX ORDER — HYDROMORPHONE HYDROCHLORIDE 1 MG/ML
.3-.5 INJECTION, SOLUTION INTRAMUSCULAR; INTRAVENOUS; SUBCUTANEOUS
Status: DISCONTINUED | OUTPATIENT
Start: 2019-07-22 | End: 2019-07-26 | Stop reason: HOSPADM

## 2019-07-22 RX ORDER — LIDOCAINE 40 MG/G
CREAM TOPICAL
Status: DISCONTINUED | OUTPATIENT
Start: 2019-07-22 | End: 2019-07-26 | Stop reason: HOSPADM

## 2019-07-22 RX ORDER — ONDANSETRON 4 MG/1
4 TABLET, ORALLY DISINTEGRATING ORAL EVERY 30 MIN PRN
Status: DISCONTINUED | OUTPATIENT
Start: 2019-07-22 | End: 2019-07-22 | Stop reason: HOSPADM

## 2019-07-22 RX ORDER — HYDROMORPHONE HYDROCHLORIDE 1 MG/ML
INJECTION, SOLUTION INTRAMUSCULAR; INTRAVENOUS; SUBCUTANEOUS
Status: COMPLETED
Start: 2019-07-22 | End: 2019-07-22

## 2019-07-22 RX ORDER — FENTANYL CITRATE 50 UG/ML
INJECTION, SOLUTION INTRAMUSCULAR; INTRAVENOUS PRN
Status: DISCONTINUED | OUTPATIENT
Start: 2019-07-22 | End: 2019-07-22

## 2019-07-22 RX ORDER — SODIUM CHLORIDE 9 MG/ML
INJECTION, SOLUTION INTRAVENOUS CONTINUOUS
Status: DISCONTINUED | OUTPATIENT
Start: 2019-07-22 | End: 2019-07-23

## 2019-07-22 RX ORDER — HYDROMORPHONE HYDROCHLORIDE 1 MG/ML
.3-.5 INJECTION, SOLUTION INTRAMUSCULAR; INTRAVENOUS; SUBCUTANEOUS EVERY 5 MIN PRN
Status: DISCONTINUED | OUTPATIENT
Start: 2019-07-22 | End: 2019-07-22 | Stop reason: HOSPADM

## 2019-07-22 RX ORDER — LIDOCAINE HCL/EPINEPHRINE/PF 2%-1:200K
VIAL (ML) INJECTION PRN
Status: DISCONTINUED | OUTPATIENT
Start: 2019-07-22 | End: 2019-07-22

## 2019-07-22 RX ORDER — LABETALOL HYDROCHLORIDE 5 MG/ML
10 INJECTION, SOLUTION INTRAVENOUS
Status: DISCONTINUED | OUTPATIENT
Start: 2019-07-22 | End: 2019-07-22 | Stop reason: HOSPADM

## 2019-07-22 RX ORDER — SODIUM CHLORIDE, SODIUM LACTATE, POTASSIUM CHLORIDE, CALCIUM CHLORIDE 600; 310; 30; 20 MG/100ML; MG/100ML; MG/100ML; MG/100ML
INJECTION, SOLUTION INTRAVENOUS CONTINUOUS
Status: DISCONTINUED | OUTPATIENT
Start: 2019-07-22 | End: 2019-07-22 | Stop reason: HOSPADM

## 2019-07-22 RX ORDER — LIDOCAINE 40 MG/G
CREAM TOPICAL
Status: DISCONTINUED | OUTPATIENT
Start: 2019-07-22 | End: 2019-07-22 | Stop reason: HOSPADM

## 2019-07-22 RX ORDER — NALOXONE HYDROCHLORIDE 0.4 MG/ML
.1-.4 INJECTION, SOLUTION INTRAMUSCULAR; INTRAVENOUS; SUBCUTANEOUS
Status: ACTIVE | OUTPATIENT
Start: 2019-07-22 | End: 2019-07-23

## 2019-07-22 RX ORDER — GLYCOPYRROLATE 0.2 MG/ML
INJECTION, SOLUTION INTRAMUSCULAR; INTRAVENOUS PRN
Status: DISCONTINUED | OUTPATIENT
Start: 2019-07-22 | End: 2019-07-22

## 2019-07-22 RX ORDER — WARFARIN SODIUM 7.5 MG/1
7.5 TABLET ORAL
Status: COMPLETED | OUTPATIENT
Start: 2019-07-22 | End: 2019-07-22

## 2019-07-22 RX ORDER — DIPHENHYDRAMINE HYDROCHLORIDE 50 MG/ML
12.5 INJECTION INTRAMUSCULAR; INTRAVENOUS EVERY 6 HOURS PRN
Status: DISCONTINUED | OUTPATIENT
Start: 2019-07-22 | End: 2019-07-26 | Stop reason: HOSPADM

## 2019-07-22 RX ORDER — DIPHENHYDRAMINE HCL 12.5MG/5ML
12.5 LIQUID (ML) ORAL EVERY 6 HOURS PRN
Status: DISCONTINUED | OUTPATIENT
Start: 2019-07-22 | End: 2019-07-26 | Stop reason: HOSPADM

## 2019-07-22 RX ORDER — ONDANSETRON 4 MG/1
4 TABLET, ORALLY DISINTEGRATING ORAL EVERY 6 HOURS PRN
Status: DISCONTINUED | OUTPATIENT
Start: 2019-07-22 | End: 2019-07-26 | Stop reason: HOSPADM

## 2019-07-22 RX ORDER — AMOXICILLIN 250 MG
1 CAPSULE ORAL 2 TIMES DAILY
Status: DISCONTINUED | OUTPATIENT
Start: 2019-07-22 | End: 2019-07-26 | Stop reason: HOSPADM

## 2019-07-22 RX ORDER — ONDANSETRON 2 MG/ML
INJECTION INTRAMUSCULAR; INTRAVENOUS PRN
Status: DISCONTINUED | OUTPATIENT
Start: 2019-07-22 | End: 2019-07-22

## 2019-07-22 RX ORDER — FENTANYL CITRATE 50 UG/ML
25-50 INJECTION, SOLUTION INTRAMUSCULAR; INTRAVENOUS
Status: DISCONTINUED | OUTPATIENT
Start: 2019-07-22 | End: 2019-07-22 | Stop reason: HOSPADM

## 2019-07-22 RX ADMIN — FENTANYL CITRATE 50 MCG: 50 INJECTION INTRAMUSCULAR; INTRAVENOUS at 10:00

## 2019-07-22 RX ADMIN — ACETAMINOPHEN 975 MG: 325 TABLET, FILM COATED ORAL at 09:52

## 2019-07-22 RX ADMIN — VANCOMYCIN HYDROCHLORIDE 1500 MG: 5 INJECTION, POWDER, LYOPHILIZED, FOR SOLUTION INTRAVENOUS at 17:52

## 2019-07-22 RX ADMIN — KETOROLAC TROMETHAMINE 15 MG: 15 INJECTION, SOLUTION INTRAMUSCULAR; INTRAVENOUS at 12:10

## 2019-07-22 RX ADMIN — SENNOSIDES AND DOCUSATE SODIUM 1 TABLET: 8.6; 5 TABLET ORAL at 21:33

## 2019-07-22 RX ADMIN — LIDOCAINE HYDROCHLORIDE,EPINEPHRINE BITARTRATE 10 ML: 20; .005 INJECTION, SOLUTION EPIDURAL; INFILTRATION; INTRACAUDAL; PERINEURAL at 07:10

## 2019-07-22 RX ADMIN — HYDROMORPHONE HYDROCHLORIDE 0.5 MG: 1 INJECTION, SOLUTION INTRAMUSCULAR; INTRAVENOUS; SUBCUTANEOUS at 11:16

## 2019-07-22 RX ADMIN — HYDROMORPHONE HYDROCHLORIDE 0.5 MG: 1 INJECTION, SOLUTION INTRAMUSCULAR; INTRAVENOUS; SUBCUTANEOUS at 10:20

## 2019-07-22 RX ADMIN — GLYCOPYRROLATE 1 MG: 0.2 INJECTION, SOLUTION INTRAMUSCULAR; INTRAVENOUS at 09:15

## 2019-07-22 RX ADMIN — ROPIVACAINE HYDROCHLORIDE 20 ML: 7.5 INJECTION, SOLUTION EPIDURAL; PERINEURAL at 07:10

## 2019-07-22 RX ADMIN — Medication 5 MG: at 09:16

## 2019-07-22 RX ADMIN — ROCURONIUM BROMIDE 30 MG: 10 INJECTION INTRAVENOUS at 07:49

## 2019-07-22 RX ADMIN — LIDOCAINE HYDROCHLORIDE 30 MG: 10 INJECTION, SOLUTION INFILTRATION; PERINEURAL at 07:49

## 2019-07-22 RX ADMIN — PROPOFOL 30 MG: 10 INJECTION, EMULSION INTRAVENOUS at 09:03

## 2019-07-22 RX ADMIN — OXYCODONE HYDROCHLORIDE 10 MG: 5 TABLET ORAL at 21:33

## 2019-07-22 RX ADMIN — FENTANYL CITRATE 50 MCG: 50 INJECTION INTRAMUSCULAR; INTRAVENOUS at 09:49

## 2019-07-22 RX ADMIN — FENTANYL CITRATE 50 MCG: 50 INJECTION, SOLUTION INTRAMUSCULAR; INTRAVENOUS at 08:25

## 2019-07-22 RX ADMIN — ROSUVASTATIN CALCIUM 40 MG: 20 TABLET, FILM COATED ORAL at 21:33

## 2019-07-22 RX ADMIN — KETOROLAC TROMETHAMINE 15 MG: 15 INJECTION, SOLUTION INTRAMUSCULAR; INTRAVENOUS at 17:44

## 2019-07-22 RX ADMIN — HEPARIN SODIUM 950 UNITS/HR: 10000 INJECTION, SOLUTION INTRAVENOUS at 12:49

## 2019-07-22 RX ADMIN — ONDANSETRON 4 MG: 2 INJECTION INTRAMUSCULAR; INTRAVENOUS at 09:02

## 2019-07-22 RX ADMIN — DEXMEDETOMIDINE HYDROCHLORIDE 0.5 MCG/KG/HR: 100 INJECTION, SOLUTION INTRAVENOUS at 07:55

## 2019-07-22 RX ADMIN — PROPOFOL 160 MG: 10 INJECTION, EMULSION INTRAVENOUS at 07:49

## 2019-07-22 RX ADMIN — OXYCODONE HYDROCHLORIDE 5 MG: 5 TABLET ORAL at 17:10

## 2019-07-22 RX ADMIN — SODIUM CHLORIDE, POTASSIUM CHLORIDE, SODIUM LACTATE AND CALCIUM CHLORIDE: 600; 310; 30; 20 INJECTION, SOLUTION INTRAVENOUS at 07:00

## 2019-07-22 RX ADMIN — WARFARIN SODIUM 7.5 MG: 7.5 TABLET ORAL at 17:49

## 2019-07-22 RX ADMIN — SODIUM CHLORIDE, POTASSIUM CHLORIDE, SODIUM LACTATE AND CALCIUM CHLORIDE: 600; 310; 30; 20 INJECTION, SOLUTION INTRAVENOUS at 08:54

## 2019-07-22 RX ADMIN — FENTANYL CITRATE 50 MCG: 50 INJECTION, SOLUTION INTRAMUSCULAR; INTRAVENOUS at 07:45

## 2019-07-22 RX ADMIN — PHENYLEPHRINE HYDROCHLORIDE 100 MCG: 10 INJECTION INTRAVENOUS at 07:57

## 2019-07-22 RX ADMIN — GLYCOPYRROLATE 0.2 MG: 0.2 INJECTION, SOLUTION INTRAMUSCULAR; INTRAVENOUS at 07:34

## 2019-07-22 RX ADMIN — FENTANYL CITRATE 50 MCG: 50 INJECTION, SOLUTION INTRAMUSCULAR; INTRAVENOUS at 08:02

## 2019-07-22 RX ADMIN — MIDAZOLAM 1 MG: 1 INJECTION INTRAMUSCULAR; INTRAVENOUS at 07:06

## 2019-07-22 RX ADMIN — FENTANYL CITRATE 50 MCG: 50 INJECTION, SOLUTION INTRAMUSCULAR; INTRAVENOUS at 07:40

## 2019-07-22 RX ADMIN — Medication 3900 UNITS: at 21:41

## 2019-07-22 RX ADMIN — FENTANYL CITRATE 50 MCG: 50 INJECTION, SOLUTION INTRAMUSCULAR; INTRAVENOUS at 07:34

## 2019-07-22 RX ADMIN — VANCOMYCIN HYDROCHLORIDE 1500 MG: 10 INJECTION, POWDER, LYOPHILIZED, FOR SOLUTION INTRAVENOUS at 05:59

## 2019-07-22 RX ADMIN — ACETAMINOPHEN 975 MG: 325 TABLET, FILM COATED ORAL at 17:49

## 2019-07-22 ASSESSMENT — ACTIVITIES OF DAILY LIVING (ADL)
ADLS_ACUITY_SCORE: 11
ADLS_ACUITY_SCORE: 12
ADLS_ACUITY_SCORE: 12
ADLS_ACUITY_SCORE: 11
ADLS_ACUITY_SCORE: 12

## 2019-07-22 ASSESSMENT — ENCOUNTER SYMPTOMS: DYSRHYTHMIAS: 1

## 2019-07-22 ASSESSMENT — LIFESTYLE VARIABLES: TOBACCO_USE: 1

## 2019-07-22 NOTE — PLAN OF CARE
Vital signs stable.Ambulatory.Denies pain.Surgical scrub bath done.Pt on heparin infusion at 950 units per hr.  NPO after midnight for surgery in am.Pre and post op plan of care reviewed with pt.

## 2019-07-22 NOTE — PLAN OF CARE
Pt A&Ox4. Up with A1, walker, GB. Ace wrap CDI. CMS Intact . Strong D/P. Pain managed with oxycodone. Due to void. Straight cath 380cc. Bowels hypoactive, tolerating regular diet. Continues heparin drip. ABX vanco. Capo, 2L oxygen. VSS

## 2019-07-22 NOTE — PHARMACY-ANTICOAGULATION SERVICE
Clinical Pharmacy - Warfarin Dosing Consult     Pharmacy has been consulted to manage this patient s warfarin therapy.  Indication: (mechanical mitral and aortic valve replacements)  Therapy Goal: INR 2.5-3.5  Warfarin Prior to Admission: Yes  Warfarin PTA Regimen: per med hx: warfarin 5mg on Mon and Fri, 7.5mg the rest of the week    INR   Date Value Ref Range Status   07/22/2019 1.07 0.86 - 1.14 Final   07/19/2019 1.50 (H) 0.86 - 1.14 Final     Comment:     This test is intended for monitoring Coumadin therapy.  Results are not   accurate in patients with prolonged INR due to factor deficiency.         Recommend warfarin 7.5mg today.  Pharmacy will monitor Fausto Farr daily and order warfarin doses to achieve specified goal.      Please contact pharmacy as soon as possible if the warfarin needs to be held for a procedure or if the warfarin goals change.

## 2019-07-22 NOTE — PROGRESS NOTES
[facility]  Hospitalist Progress Note  Patient Name: Fausto Farr    MRN: [id number]  Provider: Eddi Contreras MD  07/22/19    Initial presenting complaint/issue to hospital (Diagnosis): total knee arthroplasty         Assessment and Plan:      Fausto Farr is a 79 year old male with history of mechanical aortic valve replacement, repeat mechanical aortic valve replacement, mechanical mitral valve replacement, atrial flutter s/p ablation procedure, history of stroke, obstructive sleep apnea for which he uses CPAP with sleep, coronary artery disease with previous CABG, and stress test in 4/2019 which showed no ischemia. He stopped his coumadin on 7/17 in anticipation of fight total knee arthroplasty on 7/22. Given his high risk of thrombotic event the plan was for him to be admitted for heparin drip once his INR was below 2. He went to the INR clninc on 7/19/19 for INR check and was found to have an INR of 1.5. Direct admission for bridging heparin drip until knee surgery was arranged. Ralph was bridged until surgery with Heparin drip. Right total knee arthroplasty was done on 7/22 without complications.      Problem list:     1. S/p right total knee arthroplasty on 7/22. No obvious complications. Post op cares (pain control, therapy, etc) per Ortho.     2. History of mechanical mitral and aortic valve replacements for which Ralph is chronically anticoagulated with Coumadin with goal INR Of 2.5-3.5. He was admitted from INR clinic with INR of 1.5 for bridging anticoagulation with Heparin drip. This was recommended by Ralph's Cardiologist (Geovanny Fong) given his high risk of thrombotic event off of anticoagulation or even with Lovenox bridging therapy.  Heparin drip was stopped prior to surgery and restarted after surgery. Continue Heparin drip until INR is > 2.       3. Hypercholesterolemia. Continue PTA Zetia and Crestor.     4. Hypertension. Continue PTA Metoprolol with hold  parameters.      5. History of atrial flutter with previous ablation procedure Continue PTA Metoprolol. On heparin drip while Coumadin on hold for surgery. OK to stop telemetry.     6. Obstructive sleep apnea. Continue CPAP with sleep.            Diet: I changed cardiac diet to regular per patient request  DVT Prophylaxis: IV heparin.  Lord Catheter: not present  Code Status: Full Code.  Disposition: Continue inpatient cares. Likely here for 3-4 days or so (will need bridging treatment with Heparin until INR is > 2 per Dr. Fong)        Interval History:      No new problems. Tolerated surgery well.                   Physical Exam:      Last Vital Signs:  /64 (BP Location: Right arm)   Pulse 66   Temp 96  F (35.6  C)   Resp 16   Wt 102.2 kg (225 lb 3.2 oz)   SpO2 97%   BMI 36.35 kg/m      Intake/Output Summary (Last 24 hours) at 7/22/2019 1618  Last data filed at 7/22/2019 1032  Gross per 24 hour   Intake 2425 ml   Output 10 ml   Net 2415 ml       GENERAL:  Comfortable. Cooperative.  PSYCH: pleasant, oriented, No acute distress.  EYES: PERRLA, Normal conjunctiva.  HEART:  Regular rate and rhythm. No JVD. Pulses normal. No edema.  LUNGS:  Clear to auscultation, normal Respiratory effort.  ABDOMEN:  Soft, no hepatosplenomegaly, normal bowel sounds.  EXTREMETIES: No clubbing, cyanosis or ischemia  SKIN:  Dry to touch, No rash.           Medications:      All current medications were reviewed.         Data:      All new lab and imaging data was reviewed.   Labs:       Lab Results   Component Value Date     07/19/2019     05/30/2019     04/22/2019    Lab Results   Component Value Date    CHLORIDE 105 07/19/2019    CHLORIDE 106 05/30/2019    CHLORIDE 107 04/22/2019    Lab Results   Component Value Date    BUN 19 07/19/2019    BUN 23 05/30/2019    BUN 17 04/22/2019      Lab Results   Component Value Date    POTASSIUM 4.1 07/19/2019    POTASSIUM 4.6 05/30/2019    POTASSIUM 4.1 04/22/2019     Lab Results   Component Value Date    CO2 26 07/19/2019    CO2 28 05/30/2019    CO2 26 04/22/2019    Lab Results   Component Value Date    CR 1.11 07/19/2019    CR 0.97 05/30/2019    CR 0.94 04/22/2019        Recent Labs   Lab 07/22/19  0607 07/19/19  1751   WBC 7.2 8.6   HGB 13.9 12.1*   HCT 42.8 37.7*   MCV 92 92    271

## 2019-07-22 NOTE — PLAN OF CARE
Arrived back to the unit at 1100.  VSS. IPI 8-9.  EBL 10.  DTV.  Heparin restarted per MD orders. Tolerating clear liquid diet.

## 2019-07-22 NOTE — ANESTHESIA PREPROCEDURE EVALUATION
Anesthesia Pre-Procedure Evaluation    Patient: Fausto Farr   MRN: [id number] : 1940          Preoperative Diagnosis: DJD    Procedure(s):  Right total knee arthroplasty (choice anesthesia)    Past Medical History:   Diagnosis Date     Abdominal pain      Abnormal ECG     RBBB, 1st degree AVB, Left axis deviation     Anemia     currently taking iron     Arrhythmia     pac, pvc     Back pain     since      BPH (benign prostatic hyperplasia)      Bruit      CAD (coronary artery disease)      Cellulitis 10/18/12     Cellulitis 2018    GrpB strep LLE cellulitis  negative RACHAEL for veg     Chronic venous insufficiency     bilat lower extremities     Contact dermatitis and other eczema, due to unspecified cause      Diaphragmatic hernia without mention of obstruction or gangrene      Diastolic HF (heart failure) (H)      Gastric ulcer      Glucose intolerance (impaired glucose tolerance)      Heart murmur 13    valvular heart disease     Hyperlipidemia LDL goal <100 2013     Hypertension 13     Lumbago      Malaise and fatigue      Metabolic syndrome      Mobitz (type) I (Wenckebach's) atrioventricular block     and RBBB     Nocturia 10/18/12     Nonallopathic lesion of cervical region      Nonallopathic lesion of lumbar region      Nonallopathic lesion of pelvic region, not elsewhere classified      Nonallopathic lesion of rib cage      Nonallopathic lesion of sacral region      GAGE (obstructive sleep apnea)     CPAP     Paroxysmal atrial fibrillation (H) 10/18/12     Prostate cancer (H)     radiation seed, XRT      PVD (peripheral vascular disease) (H)      RBBB     1st degree AVB, RBBB, LAHB     Rotator cuff strain     and sprain     S/P AAA repair      S/P aortic valve replacement     developed perivalve leak and MS, therefore redo surg      S/P CABG (coronary artery bypass graft) 2006    Lima-Lad, RA-Rca     Sciatica of left side     since      Sepsis due to group B  Streptococcus (H) 5/19/2018     Ulcerative colitis (H)      Varicose veins of bilateral lower extremities with other complications     s/p RLE vein stripping     Vitamin D deficiency      Past Surgical History:   Procedure Laterality Date     AORTIC VALVE REPLACEMENT  1/3/06    redo AVR SJM 21mm and SJM MVR 27mm in 2013SJM 21(AGFN 756):AVR, SJM 27  501:MVR-     ARTHROPLASTY KNEE      right knee     BACK SURGERY  Oct 2015    Fusion L4-5, laminectomy L2, L3     BYPASS GRAFT ARTERY CORONARY  10/2013    reimplantation of radial artery graft to RCA     C CABG, VEIN, TWO  1/3/06    Left radial to RCA, LIMA to LAD (RA to RCA reimplanted at time of 2013 surg)     CARDIAC CATHERIZATION  11/2005    Stent placed to RCA     CARDIAC CATHERIZATION  04/2013    Occluded RCA, patent LIMA to LAD and radial graft to PDA     CARPAL TUNNEL RELEASE RT/LT  1994     COLONOSCOPY  8-22-11     CYSTOSCOPY FLEXIBLE  10/16/2013    Procedure: CYSTOSCOPY FLEXIBLE;  FLEXIBLE CYSTOSCOPY / DILATION OF URETHRA / INSERTION OF LESLIE;  Surgeon: Cooper Wallace MD;  Location:  OR     ENDOVASCULAR REPAIR, INFRARENAL ABDOMINAL AORTIC ANEURYSM/DISSECTION; MODULAR BIFURCATED PROSTHESIS  2006    AAA repair endovascular     ENT SURGERY       EP ABLATION ATRIAL FLUTTER N/A 4/22/2019    Procedure: EP Ablation Atrial Flutter;  Surgeon: Jessy Vasquez MD;  Location:  HEART CARDIAC CATH LAB     GENITOURINARY SURGERY  6/16/08    radioactive seeding     HEAD & NECK SURGERY  1997    vocal cord polypectomy     KNEE SURGERY  2001 Right knee arthroscopy     OPTICAL TRACKING SYSTEM FUSION SPINE POSTERIOR LUMBAR THREE+ LEVELS N/A 10/29/2015    Procedure: OPTICAL TRACKING SYSTEM FUSION SPINE POSTERIOR LUMBAR THREE+ LEVELS;  Surgeon: Walt Garcia MD;  Location:  OR     PROSTATE SURGERY      radioactive seeding 6/16/08     REPAIR ANEURYSM ABDOMINAL AORTA  06/08     REPAIR VALVE MITRAL  10/16/2013    [facility] 21(AGFN 756):AVR, SJM 27  501:MVRProcedure:  REPAIR VALVE MITRAL;  REDO STERNOTOMY/REDO AORTIC VALVE REPLACEMENT/ MITRAL VALVE REPLACEMENT/REIMPLANTATION OF RIGHT CORONARY ARTERY BYPASS WITH RACHAEL ( ON PUMP);  Surgeon: Viet Singh MD;  Location:  OR     REPLACE VALVE AORTIC  10/16/2013    Procedure: REPLACE VALVE AORTIC;;  Surgeon: Viet Singh MD;  Location:  OR     SURGERY GENERAL IP CONSULT  05/2008 Excision aneurysm abdominal aorta     SURGERY GENERAL IP CONSULT  1997 Vocal cord polypectomy     VASCULAR SURGERY  1968, 1993     varicose vein stripping     Anesthesia Evaluation     .             ROS/MED HX    ENT/Pulmonary:     (+)sleep apnea, tobacco use, Past use , . .    Neurologic:      (-) CVA, TIA, Other neuro hx and Dementia   Cardiovascular: Comment: Study Result     GATED MYOCARDIAL PERFUSION SCINTIGRAPHY WITH INTRAVENOUS PHARMACOLOGIC  VASODILATATION LEXISCAN -ONE DAY STUDY      4/18/2019 10:22 AM  LIANG VAUGHN  78 years  Male  1940.     Indication/Clinical History: Abnormal EKG and history of coronary  disease with previous 2 vessel CABG     Impression  1.  Myocardial perfusion imaging using single isotope technique  demonstrated fixed inferior inferior septal defect and a second fixed  apical defect. There is no significant reversibility on this study to  suggest ischemia.   2. Gated images demonstrated normal LV cavity size and systolic  function.  The left ventricular systolic function is 59%.  3. Compared to the prior study from no prior study .        (+) Dyslipidemia, hypertension--CAD, -past MI,CABG-stent,. : . CHF . . :. dysrhythmias a-flutter, a-fib and 1st Deg Heart Block, valvular problems/murmurs S/P AVR, MVR:.       METS/Exercise Tolerance:     Hematologic:     (+) History of Transfusion -     (-) anemia   Musculoskeletal:   (+) arthritis,  other musculoskeletal- S/P Lumbar Fusion      GI/Hepatic:        (-) GERD, hiatal hernia and hepatitis   Renal/Genitourinary:      (-) renal disease   Endo:      (+) Obesity, .   (-) Type I DM, Type II DM, thyroid disease, chronic steroid usage and other endocrine disorder   Psychiatric:        (-) psychiatric history   Infectious Disease:  - neg infectious disease ROS       Malignancy:   (+) Malignancy History of Prostate          Other:    (+) H/O Chronic Pain,                        Physical Exam      Airway   Mallampati: II  TM distance: >3 FB  Neck ROM: full    Dental     Cardiovascular   Rhythm and rate: regular and normal  (-) no murmur    Pulmonary    breath sounds clear to auscultation    Other findings: Lab Test        07/22/19 07/19/19 07/19/19 06/25/19 05/30/19 05/21/19                       0607          1751          0956          0902          1101          0835          WBC          7.2          8.6           --           --          10.3          --           HGB          13.9         12.1*         --           --          14.4          --           MCV          92           92            --           --          91            --           PLT          287          271           --           --          300           --           INR           --           --          1.50*        3.5*          --          3.3*           Lab Test        07/19/19 05/30/19 04/22/19                       1751          1101          0724          NA           137          140          139           POTASSIUM    4.1          4.6          4.1           CHLORIDE     105          106          107           CO2          26           28           26            BUN          19           23           17            CR           1.11         0.97         0.94          ANIONGAP     6            6            6             AWILDA          8.9          8.4*         9.4           GLC          98           106*         120*                Lab Results   Component Value Date    WBC 7.2 07/22/2019    HGB 13.9 07/22/2019    HCT 42.8 07/22/2019     07/22/2019    NA  "137 07/19/2019    POTASSIUM 4.1 07/19/2019    CHLORIDE 105 07/19/2019    CO2 26 07/19/2019    BUN 19 07/19/2019    CR 1.11 07/19/2019    GLC 98 07/19/2019    AWILDA 8.9 07/19/2019    PHOS 3.8 10/18/2013    MAG 1.9 05/21/2018    ALBUMIN 4.1 05/30/2019    PROTTOTAL 8.0 05/30/2019    ALT 70 05/30/2019    AST 41 05/30/2019    ALKPHOS 57 05/30/2019    BILITOTAL 0.3 05/30/2019    PTT 37 10/16/2013    INR 1.50 (H) 07/19/2019    FIBR 229 10/16/2013    TSH 0.96 05/08/2018    T4 0.79 11/21/2005       Preop Vitals  BP Readings from Last 3 Encounters:   07/22/19 97/65   07/08/19 124/54   07/08/19 126/58    Pulse Readings from Last 3 Encounters:   07/21/19 64   07/08/19 60   07/08/19 51      Resp Readings from Last 3 Encounters:   07/22/19 16   04/22/19 16   01/08/19 12    SpO2 Readings from Last 3 Encounters:   07/22/19 96%   07/08/19 95%   07/08/19 97%      Temp Readings from Last 1 Encounters:   07/22/19 97.9  F (36.6  C) (Temporal)    Ht Readings from Last 1 Encounters:   07/08/19 1.676 m (5' 6\")      Wt Readings from Last 1 Encounters:   07/19/19 102.2 kg (225 lb 3.2 oz)    Estimated body mass index is 36.35 kg/m  as calculated from the following:    Height as of 7/8/19: 1.676 m (5' 6\").    Weight as of this encounter: 102.2 kg (225 lb 3.2 oz).       Anesthesia Plan      History & Physical Review  History and physical reviewed and following examination; no interval change.    ASA Status:  3 .    NPO Status:  > 8 hours    Plan for General and ETT with Propofol induction. Maintenance will be Balanced.    PONV prophylaxis:  Ondansetron (or other 5HT-3)  Precedex gtt      Postoperative Care  Postoperative pain management:  IV analgesics and Oral pain medications.      Consents  Anesthetic plan, risks, benefits and alternatives discussed with:  Patient.  Use of blood products discussed: Yes.   Use of blood products discussed with Patient.  Consented to blood products.  .                 Parker Sanabria, " MD                    .

## 2019-07-22 NOTE — ANESTHESIA PROCEDURE NOTES
Peripheral nerve/Neuraxial procedure note : Saphenous  Pre-Procedure  Performed by Parker Sanabria MD  Referred by CHRISTI  Location: pre-op      Pre-Anesthestic Checklist: patient identified, IV checked, site marked, risks and benefits discussed, informed consent, monitors and equipment checked, pre-op evaluation, at physician/surgeon's request and post-op pain management    Timeout  Correct Patient: Yes   Correct Procedure: Yes   Correct Site: Yes   Correct Laterality: Yes   Correct Position: Yes   Site Marked: Yes   .   Procedure Documentation    .    Procedure:  right  Saphenous.     Ultrasound used to identify targeted nerve, plexus, or vascular marker and placed a needle adjacent to it., Ultrasound was used to visualize the spread of the anesthetic in close proximity to the above stated nerve.   Patient Prep;mask, sterile gloves, povidone-iodine 7.5% surgical scrub.  .  Needle: insulated, short bevel Needle Gauge: 20.    Needle Length (Inches) 2  Insertion Method: Single Shot.       Assessment/Narrative  Paresthesias: No.  Injection made incrementally with aspirations every 5 mL..  The placement was negative for: blood aspirated, painful injection and site bleeding.  Bolus given via needle..   Secured via.   Complications: none. Test dose of mL at. Test dose negative for signs of intravascular, subdural or intrathecal injection. Comments:  The surgeon has given a verbal order transferring care of this patient to me for the performance of a regional analgesia block for post-op pain control. It is requested of me because I am uniquely trained and qualified to perform this block and the surgeon is neither trained nor qualified to perform this procedure.

## 2019-07-22 NOTE — PROGRESS NOTES
07/22/19 1600   Quick Adds   Type of Visit Initial PT Evaluation   Living Environment   Lives With spouse   Living Arrangements house   Home Accessibility stairs to enter home;stairs within home   Number of Stairs, Main Entrance 5  (Two rails)   Number of Stairs, Within Home, Primary 10  (Two rails decreasing to one rail)   Living Environment Comment Spouse is in good health and can assist on DC.    Self-Care   Usual Activity Tolerance good   Current Activity Tolerance fair   Equipment Currently Used at Home walker, rolling;cane, straight   Functional Level Prior   Ambulation 0-->independent   Transferring 0-->independent   Fall history within last six months no   General Information   Onset of Illness/Injury or Date of Surgery - Date 07/22/19   Referring Physician Mikey PAGE   Patient/Family Goals Statement Return home with OP PT   Pertinent History of Current Problem (include personal factors and/or comorbidities that impact the POC) 79 year old male s/p R TKA.    Weight-Bearing Status - RLE weight-bearing as tolerated   Cognitive Status Examination   Orientation orientation to person, place and time   Level of Consciousness alert   Pain Assessment   Patient Currently in Pain Yes, see Vital Sign flowsheet  (4/10)   Integumentary/Edema   Integumentary/Edema Comments Dressing to R LE clean, dy and intact   Range of Motion (ROM)   ROM Comment Decreased R knee AROM secondary to pain, weakness. Other extremities WFLs.    Strength   Strength Comments IND SLR on the R LE.    Bed Mobility   Bed Mobility Comments Supine to sit with SBA.    Transfer Skills   Transfer Comments Sit to/from stand with CGA   Gait   Gait Comments Ambulates 5' with FWW and CGA.    Balance   Balance Comments Requires bilat UE support on FWW for safe dynamic mobility.    Modality Interventions   Planned Modality Interventions Cryotherapy   Planned Modality Interventions Comments PRN   General Therapy Interventions   Planned Therapy Interventions  "bed mobility training;gait training;ROM;strengthening;transfer training;home program guidelines;progressive activity/exercise   Clinical Impression   Criteria for Skilled Therapeutic Intervention yes, treatment indicated   PT Diagnosis Impaired gait   Influenced by the following impairments Pain, decreased R LE AROM/strength, impaired balance   Functional limitations due to impairments Decreased IND with bed mobility, transfers and ambulation.    Clinical Presentation Stable/Uncomplicated   Clinical Presentation Rationale Stable.    Clinical Decision Making (Complexity) Low complexity   Therapy Frequency 2x/day   Predicted Duration of Therapy Intervention (days/wks) 3 days   Anticipated Discharge Disposition Home with Outpatient Therapy   Risk & Benefits of therapy have been explained Yes   Patient, Family & other staff in agreement with plan of care Yes   [employer] TM \"6 Clicks\"   2016, Trustees of [employer], under license to [employer].  All rights reserved.   6 Clicks Short Forms Basic Mobility Inpatient Short Form   [facility]  \"6 Clicks\" V.2 Basic Mobility Inpatient Short Form   1. Turning from your back to your side while in a flat bed without using bedrails? 4 - None   2. Moving from lying on your back to sitting on the side of a flat bed without using bedrails? 3 - A Little   3. Moving to and from a bed to a chair (including a wheelchair)? 3 - A Little   4. Standing up from a chair using your arms (e.g., wheelchair, or bedside chair)? 3 - A Little   5. To walk in hospital room? 3 - A Little   6. Climbing 3-5 steps with a railing? 2 - A Lot   Basic Mobility Raw Score (Score out of 24.Lower scores equate to lower levels of function) 18   Total Evaluation Time   Total Evaluation Time (Minutes) 5     "

## 2019-07-22 NOTE — BRIEF OP NOTE
[facility]    Brief Operative Note    Pre-operative diagnosis: DJD  Post-operative diagnosis * No post-op diagnosis entered *  Procedure: Procedure(s):  Right total knee arthroplasty  Surgeon: Surgeon(s) and Role:     * Prasanth Jensen MD - Primary  Anesthesia: General   Estimated blood loss: Less than 50 ml  Drains: None  Specimens: * No specimens in log *  Findings:   None.  Complications: None   .  Implants:    Implant Name Type Inv. Item Serial No.  Lot No. LRB No. Used   BONE CEMENT RADIOPAQUE SIMPLEX P SPEEDSET 6192-1-001 Cement, Bone BONE CEMENT RADIOPAQUE SIMPLEX P SPEEDSET 6192-1-001  [employer] [id number] Right 2   IMP COMP PATELLA SNR KOKO II 9X32MM [id number] Total Joint Component/Insert IMP COMP PATELLA SNR KOKO II 9X32MM [id number]  [employer]  [id number] Right 1   SIZE 6, RIGHT BI CRUCIATE STABILIZED FEMORAL COMPONENT    [employer]LL[employer]O &amp; NEPHEW [id number] Right 1   IMP COMP TIBIAL TRAY SNN JOURNEY NP RT SZ 5 Total Joint Component/Insert IMP COMP TIBIAL TRAY SNN JOURNEY NP RT SZ 5  SMITH  [id number] Right 1   RIGHT SIZE 5-6 11MM, ARTIULAR INSERT COLLAZO &amp; NEPHEW    SMITH &amp; NEPHEW [id number] Right 1

## 2019-07-22 NOTE — ANESTHESIA CARE TRANSFER NOTE
Patient: Fausto Carson [facility]    Procedure(s):  Right total knee arthroplasty    Diagnosis: DJD  Diagnosis Additional Information: No value filed.    Anesthesia Type:   General, ETT     Note:  Airway :Face Mask  Patient transferred to:PACU  Comments: Spont resp VSS to PACandoff Report: Identifed the Patient, Identified the Reponsible Provider, Reviewed the pertinent medical history, Discussed the surgical course, Reviewed Intra-OP anesthesia mangement and issues during anesthesia, Set expectations for post-procedure period and Allowed opportunity for questions and acknowledgement of understanding      Vitals: (Last set prior to Anesthesia Care Transfer)    CRNA VITALS  7/22/2019 0855 - 7/22/2019 0933      7/22/2019             NIBP:  111/69    Pulse:  59    SpO2:  98 %    Resp Rate (observed):  18    EKG:  Sinus rhythm                Electronically Signed By: ADELE Batista CRNA  July 22, 2019  9:33 AM

## 2019-07-22 NOTE — PLAN OF CARE
PT: Orders received, evaluation completed and treatment initiated. 79 year old male s/p R TKA. Pt reports IND with mobility at baseline. Lives in a split level home with 5 AARON and an additional 10 stairs up to the bedroom. Pt lives with his wife who can assist as needed on discharge. Has a walker and a cane.     Discharge Planner PT   Patient plan for discharge: Pt reports plan to be home with OP PT. No TCO plan noted in chart.   Current status: IND SLR on the R LE; no KI needed. Supine to/from sit with SBA. Sit to/from stand with CGA. Ambulates 25' x 2 with FWW and CGA. No R LE buckling noted. Pain limits further ambulation.   Barriers to return to prior living situation: Stairs - will address in PT  Recommendations for discharge: No TCO plan noted in chart. Anticipate home with OP PT.   Rationale for recommendations: Patient moving well. Anticipate that with continued IP PT the pt will meet IP PT goals by AM of POD2. Recommend OP PT following discharge to maximize post-op outcomes.       Entered by: Edna Hammond 07/22/2019 4:46 PM

## 2019-07-22 NOTE — ANESTHESIA POSTPROCEDURE EVALUATION
Patient: Fausto Carson [facility]    Procedure(s):  Right total knee arthroplasty    Diagnosis:DJD  Diagnosis Additional Information: No value filed.    Anesthesia Type:  General, ETT    Note:  Anesthesia Post Evaluation    Patient location during evaluation: PACU  Patient participation: Able to fully participate in evaluation  Level of consciousness: awake  Pain management: adequate  Airway patency: patent  Cardiovascular status: acceptable  Respiratory status: acceptable  Hydration status: euvolemic  PONV: controlled     Anesthetic complications: None          Last vitals:  Vitals:    07/22/19 1200 07/22/19 1300 07/22/19 1400   BP: 126/70 106/56 111/64   Pulse: 66 70 66   Resp: 16 16 16   Temp:      SpO2: 96% 97% 97%         Electronically Signed By: Parker Sanabria MD  July 22, 2019  4:14 PM

## 2019-07-22 NOTE — PLAN OF CARE
[id number]: Pt slept well during the night. VSS. A&O. Heparin gtt infusing at 9.5ml/hr- stopped at 0530 for surgery. NPO since 0000. Transfers ind. Went down for surgery at 0530.

## 2019-07-22 NOTE — OP NOTE
Procedure Date: 07/22/2019      PREOPERATIVE DIAGNOSIS:  Degenerative joint disease, right knee.      POSTOPERATIVE DIAGNOSIS:  Degenerative joint disease, right knee.      PROCEDURE:  Right total knee arthroplasty.      ANESTHESIA:  General.        FIRST ASSISTANT:  JEANINE Engel      INDICATIONS:  This 79-year-old man has had chronic increasing problems with right knee pain and has failed a nonoperative treatment program.  Appropriate risks and alternatives have been discussed at length, and it has been elected to proceed with surgical intervention.      DESCRIPTION OF PROCEDURE:  The patient was brought to the operating room, given a general anesthetic, right knee prepped and draped sterilely in the usual manner.  Under tourniquet control, our standard anterior medial incision was made, dissection carried down sharply to the fascia.  Medial parapatellar arthrotomy was done, patella slid laterally and the knee flexed to 90 degrees.  Inspection of the joint revealed complete loss of articular cartilage, tricompartmental.      We used the intramedullary guide system and the gap  to osteotomize first the femur and then the tibia in 5 degrees of valgus to accept a #6 size femur and #5 size tibia, posterior stabilized.  Trial reduction was done with an 11 mm thick spacer.  We had excellent ligament balance, excellent realignment.  Patella was cut flush and patella thickness recreated with use of a caliper.  A 32 mm patella was chosen.  Trial reduction was done and patellar tracking was excellent.      Trial components were removed.  Bony surfaces water picked with antibiotic solution.  Real components cemented into place, tibia first followed by femur and patella.  When the cement had hardened, excess posterior cement was trimmed away.  We again trialed the tray and again chose an 11 mm thick poly component.  This was snapped onto the tibial tray.  Final range of motion and patellar tracking was again  excellent.      Wound was irrigated copiously with antibiotic solution.  Hemostasis was obtained by electrocautery.  Closure done in layers, closing the fascia with #1 Vicryl, subcutaneous tissue with 2-0 Vicryl, staples on the skin.  Sterile compressive dressing applied.  The patient awakened and taken to the recovery room in good condition.  There were no intraoperative complications and minimal blood loss.         SONJA BARRAZA MD             D: 2019   T: 2019   MT: EDWARD      Name:     LIANG VAUGHN   MRN:      [id number]        Account:        [id number]   :      1940           Procedure Date: 2019      Document: [id number]       cc: Sonja Barraza MD

## 2019-07-23 ENCOUNTER — APPOINTMENT (OUTPATIENT)
Dept: PHYSICAL THERAPY | Facility: CLINIC | Age: 79
DRG: 470 | End: 2019-07-23
Attending: INTERNAL MEDICINE
Payer: MEDICARE

## 2019-07-23 ENCOUNTER — APPOINTMENT (OUTPATIENT)
Dept: OCCUPATIONAL THERAPY | Facility: CLINIC | Age: 79
DRG: 470 | End: 2019-07-23
Attending: INTERNAL MEDICINE
Payer: MEDICARE

## 2019-07-23 LAB
ANION GAP SERPL CALCULATED.3IONS-SCNC: 5 MMOL/L (ref 3–14)
BUN SERPL-MCNC: 17 MG/DL (ref 7–30)
CALCIUM SERPL-MCNC: 8.1 MG/DL (ref 8.5–10.1)
CHLORIDE SERPL-SCNC: 105 MMOL/L (ref 94–109)
CO2 SERPL-SCNC: 26 MMOL/L (ref 20–32)
CREAT SERPL-MCNC: 1.02 MG/DL (ref 0.66–1.25)
ERYTHROCYTE [DISTWIDTH] IN BLOOD BY AUTOMATED COUNT: 13.2 % (ref 10–15)
GFR SERPL CREATININE-BSD FRML MDRD: 70 ML/MIN/{1.73_M2}
GLUCOSE SERPL-MCNC: 149 MG/DL (ref 70–99)
HCT VFR BLD AUTO: 32.9 % (ref 40–53)
HGB BLD-MCNC: 10.4 G/DL (ref 13.3–17.7)
INR PPP: 1.11 (ref 0.86–1.14)
LMWH PPP CHRO-ACNC: 0.28 IU/ML
MCH RBC QN AUTO: 29.5 PG (ref 26.5–33)
MCHC RBC AUTO-ENTMCNC: 31.6 G/DL (ref 31.5–36.5)
MCV RBC AUTO: 94 FL (ref 78–100)
PLATELET # BLD AUTO: 238 10E9/L (ref 150–450)
POTASSIUM SERPL-SCNC: 3.9 MMOL/L (ref 3.4–5.3)
RBC # BLD AUTO: 3.52 10E12/L (ref 4.4–5.9)
SODIUM SERPL-SCNC: 136 MMOL/L (ref 133–144)
WBC # BLD AUTO: 10.3 10E9/L (ref 4–11)

## 2019-07-23 PROCEDURE — 97530 THERAPEUTIC ACTIVITIES: CPT | Mod: GO

## 2019-07-23 PROCEDURE — 97116 GAIT TRAINING THERAPY: CPT | Mod: GP

## 2019-07-23 PROCEDURE — 12000000 ZZH R&B MED SURG/OB

## 2019-07-23 PROCEDURE — 25000132 ZZH RX MED GY IP 250 OP 250 PS 637: Mod: GY | Performed by: INTERNAL MEDICINE

## 2019-07-23 PROCEDURE — 25000128 H RX IP 250 OP 636: Performed by: ORTHOPAEDIC SURGERY

## 2019-07-23 PROCEDURE — 25000128 H RX IP 250 OP 636: Performed by: INTERNAL MEDICINE

## 2019-07-23 PROCEDURE — 80048 BASIC METABOLIC PNL TOTAL CA: CPT | Performed by: INTERNAL MEDICINE

## 2019-07-23 PROCEDURE — 99233 SBSQ HOSP IP/OBS HIGH 50: CPT | Performed by: INTERNAL MEDICINE

## 2019-07-23 PROCEDURE — 85027 COMPLETE CBC AUTOMATED: CPT | Performed by: INTERNAL MEDICINE

## 2019-07-23 PROCEDURE — 85520 HEPARIN ASSAY: CPT | Performed by: INTERNAL MEDICINE

## 2019-07-23 PROCEDURE — 97110 THERAPEUTIC EXERCISES: CPT | Mod: GP

## 2019-07-23 PROCEDURE — 25000132 ZZH RX MED GY IP 250 OP 250 PS 637: Mod: GY | Performed by: ORTHOPAEDIC SURGERY

## 2019-07-23 PROCEDURE — 97165 OT EVAL LOW COMPLEX 30 MIN: CPT | Mod: GO

## 2019-07-23 PROCEDURE — 36415 COLL VENOUS BLD VENIPUNCTURE: CPT | Performed by: INTERNAL MEDICINE

## 2019-07-23 PROCEDURE — 97535 SELF CARE MNGMENT TRAINING: CPT | Mod: GO

## 2019-07-23 PROCEDURE — 85610 PROTHROMBIN TIME: CPT | Performed by: INTERNAL MEDICINE

## 2019-07-23 RX ORDER — OXYCODONE HYDROCHLORIDE 5 MG/1
5-10 TABLET ORAL
Status: DISCONTINUED | OUTPATIENT
Start: 2019-07-23 | End: 2019-07-26 | Stop reason: HOSPADM

## 2019-07-23 RX ORDER — WARFARIN SODIUM 10 MG/1
10 TABLET ORAL
Status: COMPLETED | OUTPATIENT
Start: 2019-07-23 | End: 2019-07-23

## 2019-07-23 RX ADMIN — KETOROLAC TROMETHAMINE 15 MG: 15 INJECTION, SOLUTION INTRAMUSCULAR; INTRAVENOUS at 05:57

## 2019-07-23 RX ADMIN — OXYCODONE HYDROCHLORIDE 10 MG: 5 TABLET ORAL at 12:37

## 2019-07-23 RX ADMIN — OXYCODONE HYDROCHLORIDE 10 MG: 5 TABLET ORAL at 15:34

## 2019-07-23 RX ADMIN — OXYCODONE HYDROCHLORIDE 10 MG: 5 TABLET ORAL at 01:35

## 2019-07-23 RX ADMIN — HEPARIN SODIUM 1250 UNITS/HR: 10000 INJECTION, SOLUTION INTRAVENOUS at 00:16

## 2019-07-23 RX ADMIN — KETOROLAC TROMETHAMINE 15 MG: 15 INJECTION, SOLUTION INTRAMUSCULAR; INTRAVENOUS at 00:16

## 2019-07-23 RX ADMIN — WARFARIN SODIUM 10 MG: 10 TABLET ORAL at 18:45

## 2019-07-23 RX ADMIN — HEPARIN SODIUM 1250 UNITS/HR: 10000 INJECTION, SOLUTION INTRAVENOUS at 21:39

## 2019-07-23 RX ADMIN — OXYCODONE HYDROCHLORIDE 10 MG: 5 TABLET ORAL at 09:36

## 2019-07-23 RX ADMIN — ACETAMINOPHEN 975 MG: 325 TABLET, FILM COATED ORAL at 01:35

## 2019-07-23 RX ADMIN — ACETAMINOPHEN 975 MG: 325 TABLET, FILM COATED ORAL at 18:44

## 2019-07-23 RX ADMIN — SENNOSIDES AND DOCUSATE SODIUM 2 TABLET: 8.6; 5 TABLET ORAL at 08:08

## 2019-07-23 RX ADMIN — ROSUVASTATIN CALCIUM 40 MG: 20 TABLET, FILM COATED ORAL at 20:13

## 2019-07-23 RX ADMIN — OXYCODONE HYDROCHLORIDE 10 MG: 5 TABLET ORAL at 05:57

## 2019-07-23 RX ADMIN — SENNOSIDES AND DOCUSATE SODIUM 2 TABLET: 8.6; 5 TABLET ORAL at 20:13

## 2019-07-23 RX ADMIN — OXYCODONE HYDROCHLORIDE 10 MG: 5 TABLET ORAL at 20:13

## 2019-07-23 RX ADMIN — ACETAMINOPHEN 975 MG: 325 TABLET, FILM COATED ORAL at 09:36

## 2019-07-23 ASSESSMENT — ACTIVITIES OF DAILY LIVING (ADL)
PREVIOUS_RESPONSIBILITIES: YARDWORK;MEDICATION MANAGEMENT;DRIVING
ADLS_ACUITY_SCORE: 12

## 2019-07-23 NOTE — PROGRESS NOTES
Infection Prevention:    Patient requires Contact precautions because of MRSA: elie, [phone]. Please contact Infection Prevention with any questions/concerns at *[phone].    Ai Gallegos, ICP

## 2019-07-23 NOTE — PLAN OF CARE
marciano PO well, up with assist of 1 with gait belt and walker, PO pain meds managing pain, voiding in good amts, heparin gtt at 12.5 ml/hour, getting 10 mg Coumadin this evening for INR of 1.11

## 2019-07-23 NOTE — PLAN OF CARE
A/O, VSS pain 6/10 oxycodone given for pain control.  Has not voided all shift. Refused to try to urinate.  Last bladder scan 188cc.  On a heparin drip

## 2019-07-23 NOTE — PROGRESS NOTES
07/23/19 1106   Quick Adds   Type of Visit Initial Occupational Therapy Evaluation   Living Environment   Lives With spouse   Living Arrangements house   Home Accessibility stairs to enter home;stairs within home   Transportation Anticipated car, drives self;family or friend will provide   Living Environment Comment Pt lives in multi-level house with stairs to enter/within, walk in shower in lower level, comfort height and standard height toilets   Self-Care   Usual Activity Tolerance good   Current Activity Tolerance fair   Equipment Currently Used at Home walker, rolling;cane, straight   Functional Level   Ambulation 0-->independent   Transferring 0-->independent   Toileting 0-->independent   Bathing 0-->independent   Dressing 0-->independent   Eating 0-->independent   Communication 0-->understands/communicates without difficulty   Swallowing 0-->swallows foods/liquids without difficulty   Cognition 0 - no cognition issues reported   Fall history within last six months no   Which of the above functional risks had a recent onset or change? transferring;toileting;bathing;dressing   Prior Functional Level Comment Pt reports indep in all ADLS, IADLs and mobility tasks with no AD at baseline.    General Information   Onset of Illness/Injury or Date of Surgery - Date 07/19/19   Referring Physician Prasanth Jensen MD   Patient/Family Goals Statement Pt's goal is to d/c home   Additional Occupational Profile Info/Pertinent History of Current Problem Per chart: Pt is a 79 year male s/p R TKA   Precautions/Limitations fall precautions   Weight-Bearing Status - RLE weight-bearing as tolerated   Pain Assessment   Patient Currently in Pain Yes, see Vital Sign flowsheet  (8/10 pain in RLE)   Range of Motion (ROM)   ROM Comment WFL   Strength   Strength Comments WFL   Hand Strength   Hand Strength Comments WFL   Bed Mobility Skill: Sit to Supine   Level of [place]: Sit/Supine stand-by assist   Physical  Assist/Nonphysical Assist: Sit/Supine 1 person assist   Bed Mobility Skill: Supine to Sit   Level of [place]: Supine/Sit stand-by assist   Physical Assist/Nonphysical Assist: Supine/Sit 1 person assist   Transfer Skill: Bed to Chair/Chair to Bed   Level of [place]: Bed to Chair stand-by assist   Physical Assist/Nonphysical Assist: Bed to Chair 1 person assist   Weight-Bearing Restrictions weight-bearing as tolerated   Assistive Device - Transfer Skill Bed to Chair Chair to Bed Rehab Eval rolling walker   Transfer Skill: Sit to Stand   Level of [place]: Sit/Stand stand-by assist   Physical Assist/Nonphysical Assist: Sit/Stand 1 person assist   Transfer Skill: Sit to Stand weight-bearing as tolerated   Assistive Device for Transfer: Sit/Stand rolling walker   Transfer Skill: Toilet Transfer   Level of [place]: Toilet stand-by assist   Physical Assist/Nonphysical Assist: Toilet 1 person assist   Weight-Bearing Restrictions: Toilet weight-bearing as tolerated   Assistive Device rolling walker   Balance   Balance Comments SBA while in stance FWW level   Upper Body Dressing   Level of [place]: Dress Upper Body stand-by assist   Physical Assist/Nonphysical Assist: Dress Upper Body 1 person assist   Lower Body Dressing   Level of [place]: Dress Lower Body minimum assist (75% patients effort)   Physical Assist/Nonphysical Assist: Dress Lower Body 1 person assist   Toileting   Level of [place]: Toilet stand-by assist   Physical Assist/Nonphysical Assist: Toilet 1 person assist   Grooming   Level of [place]: Grooming stand-by assist   Physical Assist/Nonphysical Assist: Grooming 1 person assist   Instrumental Activities of Daily Living (IADL)   Previous Responsibilities yardwork;medication management;driving   IADL Comments Pt has assist from spouse as needed for IADLs   Activities of Daily Living Analysis   Impairments Contributing to Impaired Activities of Daily Living pain   ADL  "Comments Pt presents to OT below baseline level of functioning in all areas of self cares including bathing, dressing, grooming and toileting   General Therapy Interventions   Planned Therapy Interventions ADL retraining;IADL retraining;strengthening;transfer training   Clinical Impression   Criteria for Skilled Therapeutic Interventions Met yes, treatment indicated   OT Diagnosis Impaired ADLS, IADLs and mobility tasks   Influenced by the following impairments Decreased functional activity tolerance, pain   Assessment of Occupational Performance 5 or more Performance Deficits   Identified Performance Deficits Bathing, dressing, grooming, toileting, homemaking, transfers   Clinical Decision Making (Complexity) Low complexity   Therapy Frequency Daily   Predicted Duration of Therapy Intervention (days/wks) eval and 1x treat   Anticipated Discharge Disposition Home;Home with Assist   Risks and Benefits of Treatment have been explained. Yes   Patient, Family & other staff in agreement with plan of care Yes   Clinical Impression Comments Pt would benefit from skilled OT to maximize safety and indep in all ADLs, IADLs and mobility tasks due to current deficits impacting function   [facility] AM-PAC  \"6 Clicks\" Daily Activity Inpatient Short Form   1. Putting on and taking off regular lower body clothing? 3 - A Little   2. Bathing (including washing, rinsing, drying)? 3 - A Little   3. Toileting, which includes using toilet, bedpan or urinal? 4 - None   4. Putting on and taking off regular upper body clothing? 4 - None   5. Taking care of personal grooming such as brushing teeth? 4 - None   6. Eating meals? 4 - None   Daily Activity Raw Score (Score out of 24.Lower scores equate to lower levels of function) 22   Total Evaluation Time   Total Evaluation Time (Minutes) 10     "

## 2019-07-23 NOTE — CONSULTS
Care Transitions Team: Following for CC, discharge planning, and disposition.       Per chart review, BPCI care plan and PT assess/rec's pt is noted to be aligned with BPCI plan for discharge,  to home with support of spouse, Ritu.  Pt is anticipating  Outpt PT  @ [facility], to be scheduled by pt.      Pt will schedule Ortho  follow up appointment   Ortho follow up appointment has been scheduled for Tuesday 7/30/19 at 1330.      Will follow in collaboration with [facility]  Ariane Ritter  [facility]  [phone] [facility] for discharge planning.     Sammi Curtis RN, BSN CTS  Care Coordinator  [phone]

## 2019-07-23 NOTE — PROGRESS NOTES
[facility]  Hospitalist Progress Note  Patient Name: Fausto Farr    MRN: [id number]  Provider: Mallika Infante MD  Initial presenting complaint/issue to hospital (Diagnosis): total knee arthroplasty         Assessment and Plan:      Fausto Farr is a 79 year old male with history of mechanical aortic valve replacement, repeat mechanical aortic valve replacement, mechanical mitral valve replacement, atrial flutter s/p ablation procedure, history of stroke, obstructive sleep apnea for which he uses CPAP with sleep, coronary artery disease with previous CABG, and stress test in 4/2019 which showed no ischemia. He stopped his coumadin on 7/17 in anticipation of fight total knee arthroplasty on 7/22. Given his high risk of thrombotic event the plan was for him to be admitted for heparin drip once his INR was below 2. He went to the INR clninc on 7/19/19 for INR check and was found to have an INR of 1.5. Direct admission for bridging heparin drip until knee surgery was arranged. Ralph was bridged until surgery with Heparin drip. Right total knee arthroplasty was done on 7/22 without complications. He was restarted on heparin drip for bridging. Coumadin was also restarted.      Problem list:     1. S/p right total knee arthroplasty on 7/22. No obvious complications.   --Pain management, PT/OT per orthopedic surgery     2. History of mechanical mitral and aortic valve replacements for which Ralph is chronically anticoagulated with Coumadin with goal INR Of 2.5-3.5. He was admitted from INR clinic with INR of 1.5 for bridging anticoagulation with Heparin drip per recommendation by Dr. Geovanny Fong from cardiology given his high risk of thrombotic event off of anticoagulation or even with Lovenox bridging therapy.    --Heparin drip was stopped prior to surgery and restarted after surgery. Continue Heparin drip until INR is > 2.    --Heparin and coumadin dosing per pharmacy     3.  Hypercholesterolemia. Continue PTA Zetia and Crestor.     4. Hypertension. Continue PTA Metoprolol with hold parameters.      5. History of atrial flutter with previous ablation procedure Continue PTA Metoprolol. On heparin drip while Coumadin on hold for surgery. OK to stop telemetry.     6. Obstructive sleep apnea. Continue CPAP with sleep.      Diet: I changed cardiac diet to regular per patient request  DVT Prophylaxis: IV heparin.  Lord Catheter: not present  Code Status: Full Code.  Disposition: Continue inpatient cares. Likely here for 3-4 days or so (will need bridging treatment with Heparin until INR is > 2 per Dr. Fong)        Interval History:      Patient seen and examined. He stated that he is feeling better. Pain well controlled.  No fever. No cough or shortness of breath.                   Physical Exam:      Last Vital Signs:  /56 (BP Location: Right arm)   Pulse 96   Temp 98.1  F (36.7  C) (Oral)   Resp 16   Wt 102.2 kg (225 lb 3.2 oz)   SpO2 93%   BMI 36.35 kg/m      Intake/Output Summary (Last 24 hours) at 7/22/2019 1618  Last data filed at 7/22/2019 1032  Gross per 24 hour   Intake 2425 ml   Output 10 ml   Net 2415 ml       GENERAL:  Comfortable. Cooperative.  PSYCH: pleasant, oriented, No acute distress.  EYES: PERRLA, Normal conjunctiva.  HEART:  Regular rate and rhythm. No JVD. Pulses normal. No edema.  LUNGS:  Clear to auscultation, normal Respiratory effort.  ABDOMEN:  Soft, no hepatosplenomegaly, normal bowel sounds.  EXTREMETIES: No clubbing, cyanosis or ischemia  SKIN:  Dry to touch, No rash.           Medications:      All current medications were reviewed.         Data:      All new lab and imaging data was reviewed.   Labs:       Lab Results   Component Value Date     07/19/2019     05/30/2019     04/22/2019    Lab Results   Component Value Date    CHLORIDE 105 07/19/2019    CHLORIDE 106 05/30/2019    CHLORIDE 107 04/22/2019    Lab Results   Component  Value Date    BUN 19 07/19/2019    BUN 23 05/30/2019    BUN 17 04/22/2019      Lab Results   Component Value Date    POTASSIUM 4.1 07/19/2019    POTASSIUM 4.6 05/30/2019    POTASSIUM 4.1 04/22/2019    Lab Results   Component Value Date    CO2 26 07/19/2019    CO2 28 05/30/2019    CO2 26 04/22/2019    Lab Results   Component Value Date    CR 1.11 07/19/2019    CR 0.97 05/30/2019    CR 0.94 04/22/2019        Recent Labs   Lab 07/23/19  0623 07/22/19  0607 07/19/19  1751   WBC 10.3 7.2 8.6   HGB 10.4* 13.9 12.1*   HCT 32.9* 42.8 37.7*   MCV 94 92 92    287 271

## 2019-07-23 NOTE — PLAN OF CARE
OT: Order received, eval completed and treatment initiated. Pt is a 79 year male s/p R TKA. Pt lives in multi-level house with stairs to enter/within, walk in shower in lower level, comfort height and standard height toilets. Pt reports indep in all ADLS, IADLs and mobility tasks with no AD at baseline.     Discharge Planner OT   Patient plan for discharge: Home w/ spouse assist  Current status: Pt completed bed mobility supine <> sit EOB with SBA. Pt completed sit > stand from EOB to FWW with SBA. Pt ambulated to/from BR FWW level with SBA. Pt performed toilet transfer on/off toilet with use of grab bar with SBA. Pt educated on walk in shower transfer technique, CGA provided while stepping in/out over threshold of shower. Pt educated on compensatory technique for LB dressing, able to thread underwear over toes and up over hips with set up, requires min A for sock mgmt - spouse able to assist as needed. Pt and spouse educated on home safety recommendations, car transfer technique and safe activities post TKA, verbalized understanding. OT eval and 1x treat. IP OT order completed.  Barriers to return to prior living situation: Stairs (PT to address)  Recommendations for discharge: Home w/ spouse assist for IADLS (driving, homemaking)  Rationale for recommendations: Anticipate pt will progress to PLOF for safe return home. Pt has good support of spouse        Entered by: Shelli Baker 07/23/2019 11:44 AM       Occupational Therapy Discharge Summary    Reason for therapy discharge:    Discharged to home.    Progress towards therapy goal(s). See goals on Care Plan in [facility] electronic health record for goal details.  Goals partially met.  Barriers to achieving goals:   Requires min A for LB dressing - spouse to assist .    Therapy recommendation(s):    No further skilled OT needs

## 2019-07-23 NOTE — PROGRESS NOTES
Patient alert and oriented.  Up with assist of 1.  Regular diet.  Lung sounds clear.  Bowel sounds active.  Heparin infusing, dose change this shift.  Straight cath at approximately 1950, due to void.  Pain managed with ice and prn oxycodone.  CMS intact.  Dressing CDI.  /60 (BP Location: Right arm)   Pulse 66   Temp 99.4  F (37.4  C) (Oral)   Resp 16   Wt 102.2 kg (225 lb 3.2 oz)   SpO2 90%   BMI 36.35 kg/m    Patient refusing capnography at this time, continuous pulse oximetry being monitored.  Will continue to monitor.

## 2019-07-24 ENCOUNTER — APPOINTMENT (OUTPATIENT)
Dept: PHYSICAL THERAPY | Facility: CLINIC | Age: 79
DRG: 470 | End: 2019-07-24
Attending: INTERNAL MEDICINE
Payer: MEDICARE

## 2019-07-24 LAB
ANION GAP SERPL CALCULATED.3IONS-SCNC: 5 MMOL/L (ref 3–14)
BASOPHILS # BLD AUTO: 0 10E9/L (ref 0–0.2)
BASOPHILS NFR BLD AUTO: 0.3 %
BUN SERPL-MCNC: 16 MG/DL (ref 7–30)
CALCIUM SERPL-MCNC: 8.2 MG/DL (ref 8.5–10.1)
CHLORIDE SERPL-SCNC: 101 MMOL/L (ref 94–109)
CO2 SERPL-SCNC: 27 MMOL/L (ref 20–32)
CREAT SERPL-MCNC: 1.1 MG/DL (ref 0.66–1.25)
DIFFERENTIAL METHOD BLD: ABNORMAL
EOSINOPHIL # BLD AUTO: 0.3 10E9/L (ref 0–0.7)
EOSINOPHIL NFR BLD AUTO: 2.3 %
ERYTHROCYTE [DISTWIDTH] IN BLOOD BY AUTOMATED COUNT: 13 % (ref 10–15)
GFR SERPL CREATININE-BSD FRML MDRD: 64 ML/MIN/{1.73_M2}
GLUCOSE SERPL-MCNC: 127 MG/DL (ref 70–99)
HCT VFR BLD AUTO: 30.1 % (ref 40–53)
HGB BLD-MCNC: 9.6 G/DL (ref 13.3–17.7)
IMM GRANULOCYTES # BLD: 0.1 10E9/L (ref 0–0.4)
IMM GRANULOCYTES NFR BLD: 0.6 %
INR PPP: 1.41 (ref 0.86–1.14)
LMWH PPP CHRO-ACNC: 0.19 IU/ML
LYMPHOCYTES # BLD AUTO: 2 10E9/L (ref 0.8–5.3)
LYMPHOCYTES NFR BLD AUTO: 16.9 %
MCH RBC QN AUTO: 29.7 PG (ref 26.5–33)
MCHC RBC AUTO-ENTMCNC: 31.9 G/DL (ref 31.5–36.5)
MCV RBC AUTO: 93 FL (ref 78–100)
MONOCYTES # BLD AUTO: 2 10E9/L (ref 0–1.3)
MONOCYTES NFR BLD AUTO: 16.8 %
NEUTROPHILS # BLD AUTO: 7.4 10E9/L (ref 1.6–8.3)
NEUTROPHILS NFR BLD AUTO: 63.1 %
NRBC # BLD AUTO: 0 10*3/UL
NRBC BLD AUTO-RTO: 0 /100
PLATELET # BLD AUTO: 232 10E9/L (ref 150–450)
POTASSIUM SERPL-SCNC: 4.3 MMOL/L (ref 3.4–5.3)
RBC # BLD AUTO: 3.23 10E12/L (ref 4.4–5.9)
SODIUM SERPL-SCNC: 133 MMOL/L (ref 133–144)
WBC # BLD AUTO: 11.7 10E9/L (ref 4–11)

## 2019-07-24 PROCEDURE — 85025 COMPLETE CBC W/AUTO DIFF WBC: CPT | Performed by: INTERNAL MEDICINE

## 2019-07-24 PROCEDURE — 12000000 ZZH R&B MED SURG/OB

## 2019-07-24 PROCEDURE — 97530 THERAPEUTIC ACTIVITIES: CPT | Mod: GP | Performed by: PHYSICAL THERAPY ASSISTANT

## 2019-07-24 PROCEDURE — 25000132 ZZH RX MED GY IP 250 OP 250 PS 637: Mod: GY | Performed by: ORTHOPAEDIC SURGERY

## 2019-07-24 PROCEDURE — 85520 HEPARIN ASSAY: CPT | Performed by: INTERNAL MEDICINE

## 2019-07-24 PROCEDURE — 80048 BASIC METABOLIC PNL TOTAL CA: CPT | Performed by: INTERNAL MEDICINE

## 2019-07-24 PROCEDURE — 99232 SBSQ HOSP IP/OBS MODERATE 35: CPT | Performed by: INTERNAL MEDICINE

## 2019-07-24 PROCEDURE — 97116 GAIT TRAINING THERAPY: CPT | Mod: GP | Performed by: PHYSICAL THERAPY ASSISTANT

## 2019-07-24 PROCEDURE — 25000128 H RX IP 250 OP 636: Performed by: INTERNAL MEDICINE

## 2019-07-24 PROCEDURE — 25000132 ZZH RX MED GY IP 250 OP 250 PS 637: Mod: GY | Performed by: INTERNAL MEDICINE

## 2019-07-24 PROCEDURE — 97110 THERAPEUTIC EXERCISES: CPT | Mod: GP | Performed by: PHYSICAL THERAPY ASSISTANT

## 2019-07-24 PROCEDURE — 36415 COLL VENOUS BLD VENIPUNCTURE: CPT | Performed by: INTERNAL MEDICINE

## 2019-07-24 PROCEDURE — 85610 PROTHROMBIN TIME: CPT | Performed by: INTERNAL MEDICINE

## 2019-07-24 PROCEDURE — 85018 HEMOGLOBIN: CPT | Performed by: INTERNAL MEDICINE

## 2019-07-24 RX ORDER — WARFARIN SODIUM 10 MG/1
10 TABLET ORAL
Status: COMPLETED | OUTPATIENT
Start: 2019-07-24 | End: 2019-07-24

## 2019-07-24 RX ORDER — OXYCODONE HYDROCHLORIDE 5 MG/1
5-10 TABLET ORAL
Qty: 30 TABLET | Refills: 0 | Status: SHIPPED | OUTPATIENT
Start: 2019-07-24 | End: 2019-08-28

## 2019-07-24 RX ADMIN — OXYCODONE HYDROCHLORIDE 10 MG: 5 TABLET ORAL at 17:47

## 2019-07-24 RX ADMIN — ACETAMINOPHEN 975 MG: 325 TABLET, FILM COATED ORAL at 09:46

## 2019-07-24 RX ADMIN — OXYCODONE HYDROCHLORIDE 10 MG: 5 TABLET ORAL at 05:51

## 2019-07-24 RX ADMIN — ACETAMINOPHEN 975 MG: 325 TABLET, FILM COATED ORAL at 03:33

## 2019-07-24 RX ADMIN — ACETAMINOPHEN 975 MG: 325 TABLET, FILM COATED ORAL at 17:47

## 2019-07-24 RX ADMIN — OXYCODONE HYDROCHLORIDE 10 MG: 5 TABLET ORAL at 00:33

## 2019-07-24 RX ADMIN — OXYCODONE HYDROCHLORIDE 10 MG: 5 TABLET ORAL at 08:32

## 2019-07-24 RX ADMIN — ROSUVASTATIN CALCIUM 40 MG: 20 TABLET, FILM COATED ORAL at 22:08

## 2019-07-24 RX ADMIN — WARFARIN SODIUM 10 MG: 10 TABLET ORAL at 17:47

## 2019-07-24 RX ADMIN — OXYCODONE HYDROCHLORIDE 10 MG: 5 TABLET ORAL at 13:47

## 2019-07-24 RX ADMIN — HEPARIN SODIUM 1250 UNITS/HR: 10000 INJECTION, SOLUTION INTRAVENOUS at 18:56

## 2019-07-24 RX ADMIN — SENNOSIDES AND DOCUSATE SODIUM 2 TABLET: 8.6; 5 TABLET ORAL at 08:32

## 2019-07-24 RX ADMIN — OXYCODONE HYDROCHLORIDE 10 MG: 5 TABLET ORAL at 22:08

## 2019-07-24 RX ADMIN — SENNOSIDES AND DOCUSATE SODIUM 2 TABLET: 8.6; 5 TABLET ORAL at 22:08

## 2019-07-24 ASSESSMENT — ACTIVITIES OF DAILY LIVING (ADL)
ADLS_ACUITY_SCORE: 12

## 2019-07-24 NOTE — PLAN OF CARE
A/O.  VSS, afebrile.  Pain managed with oxycodone, tylenol and ice.  CMS intact.  Acewrap removed, aquacel with moderate amount of dried drainage.  Up with A1 and WW, no KI needed.  Pt unable to urinate, voiding in small amounts with large PVR.  Order to place small for overnight and trial voiding in AM.  Tolerating regular diet.  +gas, last BM 7/21.  Heparin gtt at 12.5 units/hr, plan to discontinue when INR >2.  Pt will discharge to home when medically stable.  Will continue to monitor.

## 2019-07-24 NOTE — PROGRESS NOTES
SPIRITUAL HEALTH SERVICES Progress Note  [facility]    SHS visit per length of stay.    Introduced myself and oriented pt, Ralph, to SHS.  Pt declined SHS visit at this time, citing that his  would be visiting him in the next day or so.  Pt identifies as [place] [employer] and part of [employer] in [place], [place].    Informed pt of how to reach SHS if desired.    Plan: SHS remains available for spiritual/emotional support.      Keely Gross   Intern  Pager: [phone]

## 2019-07-24 NOTE — PLAN OF CARE
Discharge Planner PT   Patient plan for discharge: Pt reports plan to be home with OP PT. No TCO plan noted in chart.   Current status:  PT - Pt amb 50' with ww with min assist with step to gait pattern.  Pt rates pain 9/10 today.  Chair was brought up behind patient and returned to room with nursing notified. PT - Pt transfers sit to/from stand with CGA with vcs for hand placement. Pt transfers chair to chair with ww with min assist.   Barriers to return to prior living situation: Stairs - will address in PT  Recommendations for discharge: No TCO plan noted in chart. Anticipate home with OP PT. per plan established by the PT.  Rationale for recommendations: Patient moving well. Anticipate that with continued IP PT the pt will meet IP PT goals by AM of POD2. Recommend OP PT following discharge to maximize post-op outcomes.       Entered by: Chary Hedrick 07/24/2019 10:55 AM

## 2019-07-24 NOTE — PLAN OF CARE
A/O, VSS, pain 4/10 oxycodone for pain control.  Unable to void straight cath for 650 at 2100  Unable to void bladder scan at 0530 for 162 On heparin until therapeutic INR.

## 2019-07-24 NOTE — PROGRESS NOTES
[facility]  Hospitalist Progress Note  Patient Name: Fausto Farr    MRN: [id number]  Provider: Mallika Infante MD  Initial presenting complaint/issue to hospital (Diagnosis): total knee arthroplasty         Assessment and Plan:      Fausto Farr is a 79 year old male with history of mechanical aortic valve replacement, repeat mechanical aortic valve replacement, mechanical mitral valve replacement, atrial flutter s/p ablation procedure, history of stroke, obstructive sleep apnea for which he uses CPAP with sleep, coronary artery disease with previous CABG, and stress test in 4/2019 which showed no ischemia. He stopped his coumadin on 7/17 in anticipation of fight total knee arthroplasty on 7/22. Given his high risk of thrombotic event the plan was for him to be admitted for heparin drip once his INR was below 2. He went to the INR clninc on 7/19/19 for INR check and was found to have an INR of 1.5. Direct admission for bridging heparin drip until knee surgery was arranged. Ralph was bridged until surgery with Heparin drip. Right total knee arthroplasty was done on 7/22 without complications. He was restarted on heparin drip for bridging. Coumadin was also restarted.      Problem list:     1. S/p right total knee arthroplasty on 7/22. No obvious complications.   --Pain management, PT/OT per orthopedic surgery     2. History of mechanical mitral and aortic valve replacements for which Ralph is chronically anticoagulated with Coumadin with goal INR Of 2.5-3.5. He was admitted from INR clinic with INR of 1.5 for bridging anticoagulation with Heparin drip per recommendation by Dr. Geovanny Fong from cardiology given his high risk of thrombotic event off of anticoagulation or even with Lovenox bridging therapy.    --Heparin drip was stopped prior to surgery and restarted after surgery. Continue Heparin drip until INR is > 2.    --Heparin and coumadin dosing per pharmacy     3.  Hypercholesterolemia. Continue PTA Zetia and Crestor.     4. Hypertension. Continue PTA Metoprolol with hold parameters.      5. History of atrial flutter with previous ablation procedure Continue PTA Metoprolol. On heparin drip while Coumadin on hold for surgery. OK to stop telemetry.     6. Obstructive sleep apnea. Continue CPAP with sleep.      Diet: I changed cardiac diet to regular per patient request  DVT Prophylaxis: IV heparin.  Lord Catheter: not present  Code Status: Full Code.  Disposition: Continue inpatient cares. Likely here for 2-3 days or so (will need bridging treatment with Heparin until INR is > 2 per Dr. Fong)        Interval History:      Patient seen and examined.  He stated that he is feeling better.  He denies any nausea, vomiting, abdominal pain, dysuria, urgency or frequency.  Pain well controlled                  Physical Exam:      Last Vital Signs:  /54 (BP Location: Right arm)   Pulse 96   Temp 99  F (37.2  C) (Oral)   Resp 20   Wt 102.2 kg (225 lb 3.2 oz)   SpO2 93%   BMI 36.35 kg/m      Intake/Output Summary (Last 24 hours) at 7/22/2019 1618  Last data filed at 7/22/2019 1032  Gross per 24 hour   Intake 2425 ml   Output 10 ml   Net 2415 ml       GENERAL:  Comfortable. Cooperative.  PSYCH: pleasant, oriented, No acute distress.  EYES: PERRLA, Normal conjunctiva.  HEART:  Regular rate and rhythm. No JVD. Pulses normal. No edema.  LUNGS:  Clear to auscultation, normal Respiratory effort.  ABDOMEN:  Soft, no hepatosplenomegaly, normal bowel sounds.  EXTREMETIES: No clubbing, cyanosis or ischemia  SKIN:  Dry to touch, No rash.           Medications:      All current medications were reviewed.         Data:      All new lab and imaging data was reviewed.   Labs:       Lab Results   Component Value Date     07/19/2019     05/30/2019     04/22/2019    Lab Results   Component Value Date    CHLORIDE 105 07/19/2019    CHLORIDE 106 05/30/2019    CHLORIDE 107  04/22/2019    Lab Results   Component Value Date    BUN 19 07/19/2019    BUN 23 05/30/2019    BUN 17 04/22/2019      Lab Results   Component Value Date    POTASSIUM 4.1 07/19/2019    POTASSIUM 4.6 05/30/2019    POTASSIUM 4.1 04/22/2019    Lab Results   Component Value Date    CO2 26 07/19/2019    CO2 28 05/30/2019    CO2 26 04/22/2019    Lab Results   Component Value Date    CR 1.11 07/19/2019    CR 0.97 05/30/2019    CR 0.94 04/22/2019        Recent Labs   Lab 07/24/19  0648 07/23/19  0623 07/22/19  0607   WBC 11.7* 10.3 7.2   HGB 9.6* 10.4* 13.9   HCT 30.1* 32.9* 42.8   MCV 93 94 92    238 287

## 2019-07-24 NOTE — PLAN OF CARE
VSS. A and Ox4. Dressing CDI. Pt ambulated to bathroom with A1 and gait belt. Pain managed with scheduled tylenol. Will cont. To monitor

## 2019-07-25 ENCOUNTER — APPOINTMENT (OUTPATIENT)
Dept: PHYSICAL THERAPY | Facility: CLINIC | Age: 79
DRG: 470 | End: 2019-07-25
Attending: INTERNAL MEDICINE
Payer: MEDICARE

## 2019-07-25 LAB
INR PPP: 1.67 (ref 0.86–1.14)
LMWH PPP CHRO-ACNC: 0.13 IU/ML
LMWH PPP CHRO-ACNC: <0.1 IU/ML

## 2019-07-25 PROCEDURE — 25000132 ZZH RX MED GY IP 250 OP 250 PS 637: Mod: GY | Performed by: INTERNAL MEDICINE

## 2019-07-25 PROCEDURE — 85520 HEPARIN ASSAY: CPT | Performed by: ORTHOPAEDIC SURGERY

## 2019-07-25 PROCEDURE — 12000000 ZZH R&B MED SURG/OB

## 2019-07-25 PROCEDURE — 97530 THERAPEUTIC ACTIVITIES: CPT | Mod: GP | Performed by: PHYSICAL THERAPY ASSISTANT

## 2019-07-25 PROCEDURE — 97116 GAIT TRAINING THERAPY: CPT | Mod: GP | Performed by: PHYSICAL THERAPY ASSISTANT

## 2019-07-25 PROCEDURE — 25000128 H RX IP 250 OP 636: Performed by: INTERNAL MEDICINE

## 2019-07-25 PROCEDURE — 25000132 ZZH RX MED GY IP 250 OP 250 PS 637: Mod: GY | Performed by: ORTHOPAEDIC SURGERY

## 2019-07-25 PROCEDURE — 36415 COLL VENOUS BLD VENIPUNCTURE: CPT | Performed by: ORTHOPAEDIC SURGERY

## 2019-07-25 PROCEDURE — 85610 PROTHROMBIN TIME: CPT | Performed by: ORTHOPAEDIC SURGERY

## 2019-07-25 PROCEDURE — 99232 SBSQ HOSP IP/OBS MODERATE 35: CPT | Performed by: INTERNAL MEDICINE

## 2019-07-25 PROCEDURE — 85520 HEPARIN ASSAY: CPT | Performed by: INTERNAL MEDICINE

## 2019-07-25 PROCEDURE — 36415 COLL VENOUS BLD VENIPUNCTURE: CPT | Performed by: INTERNAL MEDICINE

## 2019-07-25 RX ORDER — WARFARIN SODIUM 10 MG/1
10 TABLET ORAL
Status: COMPLETED | OUTPATIENT
Start: 2019-07-25 | End: 2019-07-25

## 2019-07-25 RX ADMIN — WARFARIN SODIUM 10 MG: 10 TABLET ORAL at 17:21

## 2019-07-25 RX ADMIN — Medication 3900 UNITS: at 19:15

## 2019-07-25 RX ADMIN — HEPARIN SODIUM 1700 UNITS/HR: 10000 INJECTION, SOLUTION INTRAVENOUS at 23:40

## 2019-07-25 RX ADMIN — Medication 2100 UNITS: at 11:16

## 2019-07-25 RX ADMIN — OXYCODONE HYDROCHLORIDE 10 MG: 5 TABLET ORAL at 08:19

## 2019-07-25 RX ADMIN — OXYCODONE HYDROCHLORIDE 10 MG: 5 TABLET ORAL at 02:36

## 2019-07-25 RX ADMIN — SENNOSIDES AND DOCUSATE SODIUM 2 TABLET: 8.6; 5 TABLET ORAL at 08:14

## 2019-07-25 RX ADMIN — ACETAMINOPHEN 975 MG: 325 TABLET, FILM COATED ORAL at 02:36

## 2019-07-25 RX ADMIN — OXYCODONE HYDROCHLORIDE 5 MG: 5 TABLET ORAL at 14:18

## 2019-07-25 RX ADMIN — ROSUVASTATIN CALCIUM 40 MG: 20 TABLET, FILM COATED ORAL at 21:40

## 2019-07-25 RX ADMIN — ACETAMINOPHEN 650 MG: 325 TABLET, FILM COATED ORAL at 23:35

## 2019-07-25 RX ADMIN — ACETAMINOPHEN 650 MG: 325 TABLET, FILM COATED ORAL at 14:18

## 2019-07-25 RX ADMIN — OXYCODONE HYDROCHLORIDE 5 MG: 5 TABLET ORAL at 23:35

## 2019-07-25 RX ADMIN — SENNOSIDES AND DOCUSATE SODIUM 2 TABLET: 8.6; 5 TABLET ORAL at 19:25

## 2019-07-25 RX ADMIN — OXYCODONE HYDROCHLORIDE 5 MG: 5 TABLET ORAL at 19:25

## 2019-07-25 RX ADMIN — HEPARIN SODIUM 1400 UNITS/HR: 10000 INJECTION, SOLUTION INTRAVENOUS at 16:25

## 2019-07-25 RX ADMIN — ACETAMINOPHEN 650 MG: 325 TABLET, FILM COATED ORAL at 19:25

## 2019-07-25 ASSESSMENT — ACTIVITIES OF DAILY LIVING (ADL)
ADLS_ACUITY_SCORE: 12

## 2019-07-25 NOTE — PLAN OF CARE
Afeb, vss, cms intact except for swelling, aquacel dressing clean and dry, pain moderate, oxycodone with control but pt very sleepy, up with assist of 1 and walker, no immobilizer needed. Up to BR to void, 100cc amounts, was scanned for 135 after. Trying to encouarge fluids only drinking coffee. INR 1.67, heparin bolus given and infusion increased, coumadin 10mg scheduled for supper time. Hopes he can go tomorrow.

## 2019-07-25 NOTE — PROGRESS NOTES
[facility]  Hospitalist Progress Note  Patient Name: Fausto Farr    MRN: [id number]  Provider: Mallika Infante MD  Initial presenting complaint/issue to hospital (Diagnosis): total knee arthroplasty         Assessment and Plan:      Fausto Farr is a 79 year old male with history of mechanical aortic valve replacement, repeat mechanical aortic valve replacement, mechanical mitral valve replacement, atrial flutter s/p ablation procedure, history of stroke, obstructive sleep apnea for which he uses CPAP with sleep, coronary artery disease with previous CABG, and stress test in 4/2019 which showed no ischemia. He stopped his coumadin on 7/17 in anticipation of fight total knee arthroplasty on 7/22. Given his high risk of thrombotic event the plan was for him to be admitted for heparin drip once his INR was below 2. He went to the INR clninc on 7/19/19 for INR check and was found to have an INR of 1.5. He was sent for direct admission for bridging heparin drip. Ralph was bridged until surgery with Heparin drip. Right total knee arthroplasty was done on 7/22 without complications. He was restarted on heparin drip for bridging. Coumadin was also restarted. Patient was continue on heparin drip because of two mechanical valves with high risk for thrombotic event, prior history of stroke.       Problem list:     1. S/p right total knee arthroplasty on 7/22. No obvious complications.   --Pain management, PT/OT per orthopedic surgery. Patient is already anticoagulated with heparin drip and restarted on coumadin.      2. History of mechanical mitral and aortic valve replacements for which he is chronically anticoagulated with Coumadin with goal INR Of 2.5-3.5. He was admitted from INR clinic with INR of 1.5 for bridging anticoagulation with Heparin drip per recommendation by Dr. Geovanny Santo from cardiology given his high risk of thrombotic event off of anticoagulation or even with Lovenox bridging  therapy.    --Heparin drip was stopped prior to surgery and restarted after surgery. Continue Heparin drip until INR is > 2.    --Heparin and coumadin dosing per pharmacy     3. Hypercholesterolemia. Continue PTA Zetia and Crestor.     4. Hypertension. Continue PTA Metoprolol with hold parameters.     5. Coronary artery disease with previous CABG, and stress test in 4/2019 which showed no ischemia.     5. History of atrial flutter with previous ablation procedure Continue PTA Metoprolol. On heparin drip until INR is therapeutic. He was also restarted on Coumadin.      6. Obstructive sleep apnea. Continue CPAP with sleep.      Diet: I changed cardiac diet to regular per patient request  DVT Prophylaxis: IV heparin.  Lord Catheter: not present  Code Status: Full Code.  Disposition: Continue inpatient cares. Likely here for 2-3 days or so (will need bridging treatment with Heparin until INR is > 2 per Dr. Santo)        Interval History:      Patient seen and examined.  He stated that he is doing well. His pain is well controlled. He denies nausea or vomiting. He has no cough or shortness of breath. He has no fever.                   Physical Exam:      Last Vital Signs:  /73 (BP Location: Right arm)   Pulse 85   Temp 96.4  F (35.8  C) (Oral)   Resp 16   Wt 102.2 kg (225 lb 3.2 oz)   SpO2 95%   BMI 36.35 kg/m      Intake/Output Summary (Last 24 hours) at 7/22/2019 1618  Last data filed at 7/22/2019 1032  Gross per 24 hour   Intake 2425 ml   Output 10 ml   Net 2415 ml       GENERAL:  Comfortable. Cooperative.  PSYCH: pleasant, oriented, No acute distress.  EYES: PERRLA, Normal conjunctiva.  HEART:  Regular rate and rhythm. No JVD. Pulses normal. No edema.  LUNGS:  Clear to auscultation, normal Respiratory effort.  ABDOMEN:  Soft, no hepatosplenomegaly, normal bowel sounds.  EXTREMETIES: No clubbing, cyanosis or ischemia  SKIN:  Dry to touch, No rash.           Medications:      All current medications were  reviewed.         Data:      All new lab and imaging data was reviewed.   Labs:       Lab Results   Component Value Date     07/19/2019     05/30/2019     04/22/2019    Lab Results   Component Value Date    CHLORIDE 105 07/19/2019    CHLORIDE 106 05/30/2019    CHLORIDE 107 04/22/2019    Lab Results   Component Value Date    BUN 19 07/19/2019    BUN 23 05/30/2019    BUN 17 04/22/2019      Lab Results   Component Value Date    POTASSIUM 4.1 07/19/2019    POTASSIUM 4.6 05/30/2019    POTASSIUM 4.1 04/22/2019    Lab Results   Component Value Date    CO2 26 07/19/2019    CO2 28 05/30/2019    CO2 26 04/22/2019    Lab Results   Component Value Date    CR 1.11 07/19/2019    CR 0.97 05/30/2019    CR 0.94 04/22/2019        Recent Labs   Lab 07/24/19  0648 07/23/19  0623 07/22/19  0607   WBC 11.7* 10.3 7.2   HGB 9.6* 10.4* 13.9   HCT 30.1* 32.9* 42.8   MCV 93 94 92    238 287

## 2019-07-25 NOTE — PLAN OF CARE
A&O x4. Up w/Ax1 w/walker. BS/flatus+. Tolerating regular diet well. staci AYALA removed this am. CMS intact. Aquacel dressing changed d/t moderate amt drainage. Heparin gtt at 12.5 ml/hr, plan to discontinue when INR >2. Pain managed w/oxycodone and scheduled Tylenol. Plans to discharge home when medically stable.

## 2019-07-25 NOTE — PROGRESS NOTES
aRlph is doing well day 4 post op TKA. From an ortho standpoint he is cleared to discharge. He will follow up in clinic 10-14 days PO.

## 2019-07-25 NOTE — PLAN OF CARE
Discharge Planner PT   Patient plan for discharge: per spouse tomorrow home  Current status: gait with 3 standing rest breaks needed able to get into more normal pattern but easily slips back into forward flexed and shldr elevation with small steps pattern  Barriers to return to prior living situation: stairs will do at next session  Recommendations for discharge: No TCO plan noted in chart. Anticipate home with OP PT. per plan established by the PT  Rationale for recommendations: will not leave POD #3 per usual plan due to medical need will attempted to see BID this date and every day tomorrow.       Entered by: Lina Ackerman 07/25/2019 12:02 PM

## 2019-07-26 ENCOUNTER — APPOINTMENT (OUTPATIENT)
Dept: PHYSICAL THERAPY | Facility: CLINIC | Age: 79
DRG: 470 | End: 2019-07-26
Attending: INTERNAL MEDICINE
Payer: MEDICARE

## 2019-07-26 VITALS
RESPIRATION RATE: 18 BRPM | DIASTOLIC BLOOD PRESSURE: 68 MMHG | WEIGHT: 225.2 LBS | SYSTOLIC BLOOD PRESSURE: 141 MMHG | TEMPERATURE: 96.4 F | OXYGEN SATURATION: 96 % | BODY MASS INDEX: 36.35 KG/M2 | HEART RATE: 100 BPM

## 2019-07-26 LAB
INR PPP: 2.67 (ref 0.86–1.14)
LMWH PPP CHRO-ACNC: 0.31 IU/ML
LMWH PPP CHRO-ACNC: 0.33 IU/ML

## 2019-07-26 PROCEDURE — 99207 ZZC CDG-MDM COMPONENT: MEETS LOW - DOWN CODED: CPT | Performed by: INTERNAL MEDICINE

## 2019-07-26 PROCEDURE — 85520 HEPARIN ASSAY: CPT | Performed by: INTERNAL MEDICINE

## 2019-07-26 PROCEDURE — 36415 COLL VENOUS BLD VENIPUNCTURE: CPT | Performed by: INTERNAL MEDICINE

## 2019-07-26 PROCEDURE — 36415 COLL VENOUS BLD VENIPUNCTURE: CPT | Performed by: ORTHOPAEDIC SURGERY

## 2019-07-26 PROCEDURE — 97116 GAIT TRAINING THERAPY: CPT | Mod: GP | Performed by: PHYSICAL THERAPY ASSISTANT

## 2019-07-26 PROCEDURE — 97110 THERAPEUTIC EXERCISES: CPT | Mod: GP | Performed by: PHYSICAL THERAPY ASSISTANT

## 2019-07-26 PROCEDURE — 99232 SBSQ HOSP IP/OBS MODERATE 35: CPT | Performed by: INTERNAL MEDICINE

## 2019-07-26 PROCEDURE — 85610 PROTHROMBIN TIME: CPT | Performed by: ORTHOPAEDIC SURGERY

## 2019-07-26 PROCEDURE — 85520 HEPARIN ASSAY: CPT | Performed by: ORTHOPAEDIC SURGERY

## 2019-07-26 PROCEDURE — 25000132 ZZH RX MED GY IP 250 OP 250 PS 637: Mod: GY | Performed by: ORTHOPAEDIC SURGERY

## 2019-07-26 RX ORDER — WARFARIN SODIUM 1 MG/1
1 TABLET ORAL
Status: DISCONTINUED | OUTPATIENT
Start: 2019-07-26 | End: 2019-07-26 | Stop reason: HOSPADM

## 2019-07-26 RX ADMIN — ACETAMINOPHEN 650 MG: 325 TABLET, FILM COATED ORAL at 04:43

## 2019-07-26 RX ADMIN — SENNOSIDES AND DOCUSATE SODIUM 2 TABLET: 8.6; 5 TABLET ORAL at 08:44

## 2019-07-26 RX ADMIN — OXYCODONE HYDROCHLORIDE 5 MG: 5 TABLET ORAL at 04:43

## 2019-07-26 ASSESSMENT — ACTIVITIES OF DAILY LIVING (ADL)
ADLS_ACUITY_SCORE: 12

## 2019-07-26 NOTE — PROGRESS NOTES
[facility]  Hospitalist Progress Note  Patient Name: Fausto Farr    MRN: [id number]  Provider: Mallika Infante MD  Initial presenting complaint/issue to hospital (Diagnosis): total knee arthroplasty         Assessment and Plan:      Fausto Farr is a 79 year old male with history of mechanical aortic valve replacement, repeat mechanical aortic valve replacement, mechanical mitral valve replacement, atrial flutter s/p ablation procedure, history of stroke, obstructive sleep apnea for which he uses CPAP with sleep, coronary artery disease with previous CABG, and stress test in 4/2019 which showed no ischemia. He stopped his coumadin on 7/17 in anticipation of fight total knee arthroplasty on 7/22. Given his high risk of thrombotic event the plan was for him to be admitted for heparin drip once his INR was below 2. He went to the INR clninc on 7/19/19 for INR check and was found to have an INR of 1.5. He was sent for direct admission for bridging heparin drip. Ralph was bridged until surgery with Heparin drip. Right total knee arthroplasty was done on 7/22 without complications. He was restarted on heparin drip for bridging. Coumadin was also restarted. Patient was continued on heparin drip because of two mechanical valves with high risk for thrombotic event, prior history of stroke.       Problem list:     1. S/p right total knee arthroplasty on 7/22. No obvious complications.   --Pain management, PT/OT per orthopedic surgery. Patient is already anticoagulated with heparin drip and restarted on coumadin. Will discontinue heparin drip today as INR is therapeutic.      2. History of mechanical mitral and aortic valve replacements for which he is chronically anticoagulated with Coumadin with goal INR Of 2.5-3.5. He was admitted from INR clinic with INR of 1.5 for bridging anticoagulation with Heparin drip per recommendation by Dr. Geovanny Santo from cardiology given his high risk of thrombotic  event off of anticoagulation or even with Lovenox bridging therapy.    --Heparin drip was stopped prior to surgery and restarted after surgery. INR 2.67 today. Will discontinue heparin and discharge him on his home dose of coumadin. INR check in the clinic on Monday.      3. Hypercholesterolemia. Continued PTA Zetia and Crestor.     4. Hypertension. Continued PTA Metoprolol with hold parameters.     5. Coronary artery disease with previous CABG, and stress test in 4/2019 which showed no ischemia.     5. History of atrial flutter with previous ablation procedure Continued PTA Metoprolol.He was put on heparin drip until INR is therapeutic. He was also restarted on Coumadin. INR 2.67 today. Heparin drip discontinued     6. Obstructive sleep apnea. Continued CPAP with sleep.     Code Status: Full Code.  Disposition: ok for discharge from medical standpoint        Interval History:      Patient seen and examined.  No new complaints. Anxious to go home.                   Physical Exam:      Last Vital Signs:  /68   Pulse 100   Temp 96.4  F (35.8  C)   Resp 18   Wt 102.2 kg (225 lb 3.2 oz)   SpO2 96%   BMI 36.35 kg/m      Intake/Output Summary (Last 24 hours) at 7/22/2019 1618  Last data filed at 7/22/2019 1032  Gross per 24 hour   Intake 2425 ml   Output 10 ml   Net 2415 ml       GENERAL:  Comfortable. Cooperative.  PSYCH: pleasant, oriented, No acute distress.  EYES: PERRLA, Normal conjunctiva.  HEART:  Regular rate and rhythm. No JVD. Pulses normal. No edema.  LUNGS:  Clear to auscultation, normal Respiratory effort.  ABDOMEN:  Soft, no hepatosplenomegaly, normal bowel sounds.  EXTREMETIES: No clubbing, cyanosis or ischemia  SKIN:  Dry to touch, No rash.           Medications:      All current medications were reviewed.         Data:      All new lab and imaging data was reviewed.   Labs:       Lab Results   Component Value Date     07/19/2019     05/30/2019     04/22/2019    Lab Results    Component Value Date    CHLORIDE 105 07/19/2019    CHLORIDE 106 05/30/2019    CHLORIDE 107 04/22/2019    Lab Results   Component Value Date    BUN 19 07/19/2019    BUN 23 05/30/2019    BUN 17 04/22/2019      Lab Results   Component Value Date    POTASSIUM 4.1 07/19/2019    POTASSIUM 4.6 05/30/2019    POTASSIUM 4.1 04/22/2019    Lab Results   Component Value Date    CO2 26 07/19/2019    CO2 28 05/30/2019    CO2 26 04/22/2019    Lab Results   Component Value Date    CR 1.11 07/19/2019    CR 0.97 05/30/2019    CR 0.94 04/22/2019        Recent Labs   Lab 07/24/19  0648 07/23/19  0623 07/22/19  0607   WBC 11.7* 10.3 7.2   HGB 9.6* 10.4* 13.9   HCT 30.1* 32.9* 42.8   MCV 93 94 92    238 287

## 2019-07-26 NOTE — PLAN OF CARE
Reviewed discharge instructions and medications with patient. Questions answered. Patient discharged to home with discharge instructions, medications (oxycodone prn), and belongings at this time.

## 2019-07-26 NOTE — PLAN OF CARE
Vital signs stable.  Lungs clear,  Bowel sounds hypoactive, voiding, bm this evening.  Cms intact, dressing dry to right knee.  Pain controlled with tylenol, oxycodone, ice pack application.  Ambulated with walker, gait belt and sba of 1.Contact isolation precautions.  Hgb 9.6.Heparin infusion rate increased to 1700 units per hr, pt received  bolus of 3900 units x 1  this evening PLAN: of care reviewed with pt and daughter.

## 2019-07-26 NOTE — PLAN OF CARE
A&O x4. Up w/Ax1 w/walker. BS/flatus+. Tolerating regular diet well. Voiding well. CMS intact. Aquacel dressing CDI. Heparin gtt at 17ml/hr, plan to discontinue when INR >2. Pain managed w/oxycodone and Tylenol. Plans to discharge home possibly today.

## 2019-07-26 NOTE — PLAN OF CARE
Discharge Planner PT   Patient plan for discharge: home today  Current status: gait with much better pattern and pacing did need to stop x 3 more for UE fatigue total distance to 120 feet basic tranfers with S only does need assist for LE into bed 25% of time  Barriers to return to prior living situation: none  Recommendations for discharge: Anticipate home with OP PT. per plan established by the PT  Rationale for recommendations: goals are all met recommend to PT for discharge to home with OP PT per MD plan       Entered by: Lina Ackerman 07/26/2019 10:43 AM

## 2019-07-29 ENCOUNTER — ANTICOAGULATION THERAPY VISIT (OUTPATIENT)
Dept: NURSING | Facility: CLINIC | Age: 79
End: 2019-07-29
Payer: MEDICARE

## 2019-07-29 DIAGNOSIS — Z79.01 LONG TERM CURRENT USE OF ANTICOAGULANT THERAPY: Primary | ICD-10-CM

## 2019-07-29 DIAGNOSIS — Z95.2 S/P MITRAL VALVE REPLACEMENT: ICD-10-CM

## 2019-07-29 DIAGNOSIS — I48.91 AF (ATRIAL FIBRILLATION) (H): ICD-10-CM

## 2019-07-29 LAB — INR POINT OF CARE: 3.3 (ref 0.86–1.14)

## 2019-07-29 PROCEDURE — 99207 ZZC NO CHARGE NURSE ONLY: CPT

## 2019-07-29 PROCEDURE — 85610 PROTHROMBIN TIME: CPT | Mod: QW

## 2019-07-29 PROCEDURE — 36416 COLLJ CAPILLARY BLOOD SPEC: CPT

## 2019-07-29 NOTE — PROGRESS NOTES
ANTICOAGULATION FOLLOW-UP CLINIC VISIT    Patient Name:  Fausto Farr  Date:  7/29/2019  Contact Type:  Face to Face  Patient is accompanied in the office by his wife.    SUBJECTIVE:  Patient Findings     Positives:   Change in medications    Comments:   Right knee replacement on 7/22/19.  Taking oxycodone for pain.  Per [employer]: No interactions with warfarin found.          Clinical Outcomes     Comments:   Right knee replacement on 7/22/19.  Taking oxycodone for pain.  Per [employer]: No interactions with warfarin found.             OBJECTIVE    INR Protime   Date Value Ref Range Status   07/29/2019 3.3 (A) 0.86 - 1.14 Final       ASSESSMENT / PLAN  INR assessment THER    Recheck INR In: 3 DAYS    INR Location Clinic      Anticoagulation Summary  As of 7/29/2019    INR goal:   2.5-3.5   TTR:   56.4 % (3.2 y)   INR used for dosing:   3.3 (7/29/2019)   Warfarin maintenance plan:   5 mg (5 mg x 1) every Mon, Wed, Fri; 7.5 mg (5 mg x 1.5) all other days   Full warfarin instructions:   7/31: 7.5 mg; Otherwise 5 mg every Mon, Wed, Fri; 7.5 mg all other days   Weekly warfarin total:   45 mg   Plan last modified:   Caryn Campos RN (6/25/2019)   Next INR check:   8/1/2019   Priority:   INR   Target end date:   Indefinite    Indications    AF (atrial fibrillation) (H) [I48.91]  S/P mitral valve replacement [Z95.2]  Long term current use of anticoagulant therapy [Z79.01]             Anticoagulation Episode Summary     INR check location:       Preferred lab:       Send INR reminders to:   SHANNA DOWELL    Comments:   5mg tabs - andrade dose // transfer from [facility] // [facility] [facility] // [facility]      Anticoagulation Care Providers     Provider Role Specialty Phone number    Tu Reyes MD  Internal Medicine [phone]            See the Encounter Report to view Anticoagulation Flowsheet and Dosing Calendar (Go to Encounters tab in chart review, and find the Anticoagulation Therapy Visit)    INR is  therapeutic today. Patient will return to same maintenance dose.   Follow up in 3 days or sooner if needed.        Caryn Campos RN

## 2019-07-30 ENCOUNTER — TRANSFERRED RECORDS (OUTPATIENT)
Dept: HEALTH INFORMATION MANAGEMENT | Facility: CLINIC | Age: 79
End: 2019-07-30

## 2019-08-01 ENCOUNTER — ANTICOAGULATION THERAPY VISIT (OUTPATIENT)
Dept: NURSING | Facility: CLINIC | Age: 79
End: 2019-08-01
Payer: MEDICARE

## 2019-08-01 ENCOUNTER — OFFICE VISIT (OUTPATIENT)
Dept: PEDIATRICS | Facility: CLINIC | Age: 79
End: 2019-08-01
Payer: MEDICARE

## 2019-08-01 VITALS
BODY MASS INDEX: 35.04 KG/M2 | OXYGEN SATURATION: 97 % | SYSTOLIC BLOOD PRESSURE: 124 MMHG | DIASTOLIC BLOOD PRESSURE: 62 MMHG | TEMPERATURE: 97.6 F | WEIGHT: 217.1 LBS | HEART RATE: 68 BPM

## 2019-08-01 DIAGNOSIS — Z95.2 S/P AORTIC VALVE REPLACEMENT: ICD-10-CM

## 2019-08-01 DIAGNOSIS — Z95.2 S/P MITRAL VALVE REPLACEMENT: ICD-10-CM

## 2019-08-01 DIAGNOSIS — I50.22 CHRONIC SYSTOLIC CONGESTIVE HEART FAILURE (H): ICD-10-CM

## 2019-08-01 DIAGNOSIS — I48.91 AF (ATRIAL FIBRILLATION) (H): ICD-10-CM

## 2019-08-01 DIAGNOSIS — Z79.01 LONG TERM CURRENT USE OF ANTICOAGULANT THERAPY: Primary | ICD-10-CM

## 2019-08-01 DIAGNOSIS — R53.83 FATIGUE, UNSPECIFIED TYPE: ICD-10-CM

## 2019-08-01 DIAGNOSIS — I25.810 CORONARY ARTERY DISEASE INVOLVING CORONARY BYPASS GRAFT OF NATIVE HEART WITHOUT ANGINA PECTORIS: ICD-10-CM

## 2019-08-01 DIAGNOSIS — Z09 HOSPITAL DISCHARGE FOLLOW-UP: Primary | ICD-10-CM

## 2019-08-01 DIAGNOSIS — Z96.651 S/P TKR (TOTAL KNEE REPLACEMENT), RIGHT: ICD-10-CM

## 2019-08-01 LAB
ERYTHROCYTE [DISTWIDTH] IN BLOOD BY AUTOMATED COUNT: 14.1 % (ref 10–15)
HCT VFR BLD AUTO: 33.6 % (ref 40–53)
HGB BLD-MCNC: 10.6 G/DL (ref 13.3–17.7)
INR POINT OF CARE: 3.8 (ref 0.86–1.14)
MCH RBC QN AUTO: 29.9 PG (ref 26.5–33)
MCHC RBC AUTO-ENTMCNC: 31.5 G/DL (ref 31.5–36.5)
MCV RBC AUTO: 95 FL (ref 78–100)
PLATELET # BLD AUTO: 652 10E9/L (ref 150–450)
RBC # BLD AUTO: 3.55 10E12/L (ref 4.4–5.9)
WBC # BLD AUTO: 15.2 10E9/L (ref 4–11)

## 2019-08-01 PROCEDURE — 85027 COMPLETE CBC AUTOMATED: CPT | Performed by: INTERNAL MEDICINE

## 2019-08-01 PROCEDURE — 36416 COLLJ CAPILLARY BLOOD SPEC: CPT

## 2019-08-01 PROCEDURE — 99207 ZZC NO CHARGE NURSE ONLY: CPT

## 2019-08-01 PROCEDURE — 99214 OFFICE O/P EST MOD 30 MIN: CPT | Mod: GC | Performed by: STUDENT IN AN ORGANIZED HEALTH CARE EDUCATION/TRAINING PROGRAM

## 2019-08-01 PROCEDURE — 85610 PROTHROMBIN TIME: CPT | Mod: QW

## 2019-08-01 NOTE — PROGRESS NOTES
ANTICOAGULATION FOLLOW-UP CLINIC VISIT    Patient Name:  Fausto Farr  Date:  8/1/2019  Contact Type:  Face to Face    SUBJECTIVE:  Patient Findings     Positives:   Change in activity    Comments:   He started Physical Therapy.  Inflammation.  Right knee swollen, but no infection noted.  Staples were removed - has steri strips.    Patient denies: extra doses, illness,   bleeding, medication changes, diet changes                Clinical Outcomes     Comments:   He started Physical Therapy.  Inflammation.  Right knee swollen, but no infection noted.  Staples were removed - has steri strips.    Patient denies: extra doses, illness,   bleeding, medication changes, diet changes                   OBJECTIVE    INR Protime   Date Value Ref Range Status   08/01/2019 3.8 (A) 0.86 - 1.14 Final       ASSESSMENT / PLAN  INR assessment SUPRA    Recheck INR In: 8 DAYS    INR Location Clinic      Anticoagulation Summary  As of 8/1/2019    INR goal:   2.5-3.5   TTR:   56.4 % (3.2 y)   INR used for dosing:   3.8! (8/1/2019)   Warfarin maintenance plan:   5 mg (5 mg x 1) every Mon, Wed, Fri; 7.5 mg (5 mg x 1.5) all other days   Full warfarin instructions:   8/1: 5 mg; Otherwise 5 mg every Mon, Wed, Fri; 7.5 mg all other days   Weekly warfarin total:   45 mg   Plan last modified:   Caryn Campos RN (6/25/2019)   Next INR check:   8/9/2019   Priority:   INR   Target end date:   Indefinite    Indications    AF (atrial fibrillation) (H) [I48.91]  S/P mitral valve replacement [Z95.2]  Long term current use of anticoagulant therapy [Z79.01]             Anticoagulation Episode Summary     INR check location:       Preferred lab:       Send INR reminders to:   SHANNA DOWELL    Comments:   5mg tabs - andrade dose // transfer from [facility] // [facility] // [facility] // right knee replaced 7/22/19      Anticoagulation Care Providers     Provider Role Specialty Phone number    Tu Reyes MD  Internal Medicine  [phone]            See the Encounter Report to view Anticoagulation Flowsheet and Dosing Calendar (Go to Encounters tab in chart review, and find the Anticoagulation Therapy Visit)    INR is supra therapeutic today. Patient will take 5 mg today which will decrease weekly total by 10%, then resume maintenance dose.   Will follow up in 8 days or sooner if needed.    Dosage adjustment made based on physician directed care plan.      Caryn Campos RN

## 2019-08-01 NOTE — PATIENT INSTRUCTIONS
Thanks for coming to clinic today!     We will check your hemoglobin and then follow up about restarting the aspirin.    I will see you back in clinic in 2 weeks.

## 2019-08-01 NOTE — PROGRESS NOTES
Subjective     Fausto Farr is a 79 year old male who presents to clinic today for the following health issues:    HPI     Hospital Follow-up Visit:    Hospital/Nursing Home/IP Rehab Facility: [facility]  Date of Admission: 7/19/19  Date of Discharge: 7/26/19  Reason(s) for Admission: knee surgery            Problems taking medications regularly:  None       Medication changes since discharge: None       Problems adhering to non-medication therapy:  None    Summary of hospitalization:  Discharge summary unavailable, progress notes and hospital course reviewed     Diagnostic Tests/Treatments reviewed.  Follow up needed: none  Other Healthcare Providers Involved in Patient s Care:         Surgical follow-up appointment - next week and Physical Therapy  Update since discharge: improved.     Post Discharge Medication Reconciliation: discharge medications reconciled, continue medications without change.  Plan of care communicated with patient and family     Coding guidelines for this visit:  Type of Medical   Decision Making Face-to-Face Visit       within 7 Days of discharge Face-to-Face Visit        within 14 days of discharge   Moderate Complexity 86109 [id number]   High Complexity 89018 [id number]            -------------------------------------     Patient Active Problem List   Diagnosis     Rotator cuff (capsule) sprain     Somatic dysfunction of pelvic region     Contact dermatitis and other eczema, due to unspecified cause     Ulcerative colitis (H)     Atrial fibrillation (H)     Other specified anemias     Gastric ulcer     Bilateral low back pain with left-sided sciatica     Nonallopathic lesion of lumbar region     Nonallopathic lesion of sacral region     Diaphragmatic hernia     Abdominal pain, epigastric     Malaise and fatigue     Nocturia     Nonallopathic lesion of cervical region     Nonallopathic lesion of thoracic region     Malignant neoplasm of prostate (H)     Abdominal aortic aneurysm  (H)     Nonallopathic lesion of rib cage     Vitamin D deficiency disease     Anemia relative at 12.5 in 8-13      Essential hypertension, benign     Hyperlipidemia LDL goal <100     Prostate cancer (H)     Glucose intolerance (impaired glucose tolerance)     Sciatica of left side since 2000     Valvular heart disease     Heart murmur     MRSA (methicillin resistant Staphylococcus aureus)     Health Care Home     Urinary retention     Coronary artery disease     ACP (advance care planning)     AF (atrial fibrillation) (H)     S/P aortic valve replacement     S/P CABG (coronary artery bypass graft)     Stented coronary artery     Mixed hyperlipidemia     PVD (peripheral vascular disease) (H)     Abnormal ECG     Personal history of tobacco use, presenting hazards to health     Spinal stenosis of lumbar region without neurogenic claudication     S/P mitral valve replacement     RBBB with left anterior fascicular block     S/P lumbar spinal fusion     Long term current use of anticoagulant therapy     Chronic systolic congestive heart failure (H)     GAGE (obstructive sleep apnea)     Sepsis due to group B Streptococcus (H)     Typical atrial flutter (H)     Obesity (BMI 35.0-39.9) with comorbidity (H)     Knee pain, chronic     Total knee replacement status     Past Surgical History:   Procedure Laterality Date     AORTIC VALVE REPLACEMENT  1/3/06    redo AVR SJM 21mm and SJM MVR 27mm in 2013SJM 21([id number]):AVR, SJM 27 :MVR-     ARTHROPLASTY KNEE      right knee     ARTHROPLASTY KNEE Right 7/22/2019    Procedure: Right total knee arthroplasty;  Surgeon: Prasanth Jensen MD;  Location: [facility] OR     BACK SURGERY  Oct 2015    Fusion L4-5, laminectomy L2, L3     BYPASS GRAFT ARTERY CORONARY  10/2013    reimplantation of radial artery graft to RCA     C CABG, VEIN, TWO  1/3/06    Left radial to RCA, LIMA to LAD (RA to RCA reimplanted at time of 2013 surg)     CARDIAC CATHERIZATION  11/2005    Stent placed to RCA      CARDIAC CATHERIZATION  04/2013    Occluded RCA, patent LIMA to LAD and radial graft to PDA     CARPAL TUNNEL RELEASE RT/LT  1994     COLONOSCOPY  8-22-11     CYSTOSCOPY FLEXIBLE  10/16/2013    Procedure: CYSTOSCOPY FLEXIBLE;  FLEXIBLE CYSTOSCOPY / DILATION OF URETHRA / INSERTION OF LESLIE;  Surgeon: Cooper Wallace MD;  Location:  OR     ENDOVASCULAR REPAIR, INFRARENAL ABDOMINAL AORTIC ANEURYSM/DISSECTION; MODULAR BIFURCATED PROSTHESIS  2006    AAA repair endovascular     ENT SURGERY       EP ABLATION ATRIAL FLUTTER N/A 4/22/2019    Procedure: EP Ablation Atrial Flutter;  Surgeon: Jessy Vasquez MD;  Location:  HE[facility] CATH LAB     GENITOURINARY SURGERY  6/16/08    radioactive seeding     HEAD & NECK SURGERY  1997    vocal cord polypectomy     KNEE SURGERY  2001 Right knee arthroscopy     OPTICAL TRACKING SYSTEM FUSION SPINE POSTERIOR LUMBAR THREE+ LEVELS N/A 10/29/2015    Procedure: OPTICAL TRACKING SYSTEM FUSION SPINE POSTERIOR LUMBAR THREE+ LEVELS;  Surgeon: Walt Garcia MD;  Location:  OR     PROSTATE SURGERY      radioactive seeding 6/16/08     REPAIR ANEURYSM ABDOMINAL AORTA  06/08     REPAIR VALVE MITRAL  10/16/2013    SJM 21([id number]):AVR, SJM 27 :MVRProcedure: REPAIR VALVE MITRAL;  REDO STERNOTOMY/REDO AORTIC VALVE REPLACEMENT/ MITRAL VALVE REPLACEMENT/REIMPLANTATION OF RIGHT CORONARY ARTERY BYPASS WITH RACHAEL ( ON PUMP);  Surgeon: Viet Singh MD;  Location:  OR     REPLACE VALVE AORTIC  10/16/2013    Procedure: REPLACE VALVE AORTIC;;  Surgeon: Viet Singh MD;  Location:  OR     SURGERY GENERAL IP CONSULT  05/2008 Excision aneurysm abdominal aorta     SURGERY GENERAL IP CONSULT  1997 Vocal cord polypectomy     VASCULAR SURGERY  1968, 1993     varicose vein stripping       Social History     Tobacco Use     Smoking status: Former Smoker     Packs/day: 1.00     Years: 40.00     Pack years: 40.00     Start date: 4/15/1962     Last attempt to quit:  10/23/2002     Years since quittin.7     Smokeless tobacco: Never Used   Substance Use Topics     Alcohol use: Yes     Comment: a couple beers per week (socially)     Family History   Problem Relation Age of Onset     Coronary Artery Disease Father         CABG     Heart Disease Father         Pacemaker     Other Cancer Daughter      Heart Disease Brother      Other - See Comments Grandchild          Current Outpatient Medications   Medication Sig Dispense Refill     ezetimibe (ZETIA) 10 MG tablet Take 1 tablet (10 mg) by mouth daily 90 tablet 3     fluocinonide (LIDEX) 0.05 % external ointment Apply topically 2 times daily 60 g 2     furosemide (LASIX) 40 MG tablet Take 0.5 tablets (20 mg) by mouth daily 45 tablet 3     mesalamine (ASACOL HD) 800 MG EC tablet Take 1 tablet (800 mg) by mouth 2 times daily 180 tablet 11     metoprolol tartrate (LOPRESSOR) 25 MG tablet Take 1 tablet (25 mg) by mouth 2 times daily 180 tablet 3     oxyCODONE (ROXICODONE) 5 MG tablet Take 1-2 tablets (5-10 mg) by mouth every 3 hours as needed 30 tablet 0     rosuvastatin (CRESTOR) 40 MG tablet Take 1 tablet (40 mg) by mouth daily 90 tablet 3     tamsulosin (FLOMAX) 0.4 MG capsule TAKE 1 CAPSULE BY MOUTH EVERY DAY 90 capsule 3     warfarin (COUMADIN) 5 MG tablet Take 5 mg (1 tablet) every Mon, Fri; 7.5 mg (1.5 tablets) all other days or as directed by INR Clinic 135 tablet 3     Allergies   Allergen Reactions     Bees Anaphylaxis       -------------------------------------  Reviewed and updated as needed this visit by Provider        Had surgery on .  Notes that he has been working with PT since discharge and is making good progress.  He is able to ambulate with his walker and able to get up and down stairs.  He notes that does feel more short of breath than normal and more fatigued with activity. No chest pain or swelling.  He also endorses a decreased appetite.  His pain has been controlled with oxycodone and tylenol.  He is  currently taking approximately 4 oxycodone per day.     He has not resumed his his home ASA.  He has continued to take prenatal vitamins.  Denies melena or constipation       Review of Systems   ROS COMP: Constitutional, HEENT, cardiovascular, pulmonary, gi and gu systems are negative, except as otherwise noted.      Objective    /62 (BP Location: Right arm, Patient Position: Chair, Cuff Size: Adult Regular)   Pulse 68   Temp 97.6  F (36.4  C) (Oral)   Wt 98.5 kg (217 lb 1.6 oz)   SpO2 97%   BMI 35.04 kg/m    Body mass index is 35.04 kg/m .  Physical Exam   GENERAL: healthy, alert and no distress, mildly pale  EYES: Eyes grossly normal to inspection, conjunctivae and sclerae normal  RESP: lungs clear to auscultation - no wheezes or crackles  CV: regular rate and rhythm, normal S1 S2, no S3 or S4, no murmur, click or rub, no peripheral edema   ABDOMEN: soft, nontender, no hepatosplenomegaly, no masses and bowel sounds normal  MS: diminished range of motion and swelling surrounding right knee.   SKIN: Ecchymosis on posterior right knee.  NEURO: Normal strength and tone, mentation intact and speech normal  PSYCH: mentation appears normal, affect normal/bright    Diagnostic Test Results:  Labs reviewed in Epic        Assessment & Plan       ICD-10-CM    1. Hospital discharge follow-up Z09    2. S/P TKR (total knee replacement), right Z96.651    3. Fatigue, unspecified type R53.83 CBC with platelets   4. S/P aortic valve replacement Z95.2    5. S/P mitral valve replacement Z95.2    6. Chronic systolic congestive heart failure (H) I50.22    7. Coronary artery disease involving coronary bypass graft 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 I25.810      1. Fatigue, unspecified type  Notes that he has been feeling more tired since surgery as well as feeling more short of breath.  Hemoglobin was 9.4 at the time of discharge from a baseline of ~14.  His fatigue may be related to deconditioning vs anemia.  - CBC with  "platelets     BMI:   Estimated body mass index is 35.04 kg/m  as calculated from the following:    Height as of 7/8/19: 1.676 m (5' 6\").    Weight as of this encounter: 98.5 kg (217 lb 1.6 oz).     See Patient Instructions    Return in about 2 weeks (around 8/15/2019).    Christina Joyner MD  [facility]    ------------------    I personally saw this patient and discussed this case in depth with Dr. Joyner. I reviewed and agree with the key components of the history, physical, assessment, and plan. Complicated cardiac history but did well with heparin bridging pre and post-operatively. Today notes he is slowly improving but does feel more fatigued with activity than previous. No cough or orthopnea, no LE swelling. He continues to work with physical therapy. No new bleeding/bruising. INR supratherapeutic today and coumadin adjusted. Suspect deconditioning with recent hospital stay and surgery. Would recommend he check with Ortho to see if they are okay with restarting his aspirin when his INR returns to therapeutic level.    ADDENDUM: hemoglobin slowly improving. Suspect leukocytosis is from stress. Thrombocytosis likely 2/2 post operative anemia. Leukocytosis likely 2/2 stress- appeared very well yesterday. See counseling in results note. Will have close f/u in 2 weeks and recommend repeating CBC at that point.     PATRICIA Hargrove MD  Internal Medicine-Pediatrics  "

## 2019-08-09 ENCOUNTER — ANTICOAGULATION THERAPY VISIT (OUTPATIENT)
Dept: NURSING | Facility: CLINIC | Age: 79
End: 2019-08-09
Payer: MEDICARE

## 2019-08-09 DIAGNOSIS — Z79.01 LONG TERM CURRENT USE OF ANTICOAGULANT THERAPY: Primary | ICD-10-CM

## 2019-08-09 DIAGNOSIS — Z95.2 S/P MITRAL VALVE REPLACEMENT: ICD-10-CM

## 2019-08-09 DIAGNOSIS — I48.91 AF (ATRIAL FIBRILLATION) (H): ICD-10-CM

## 2019-08-09 LAB — INR POINT OF CARE: 3.3 (ref 0.86–1.14)

## 2019-08-09 PROCEDURE — 99207 ZZC NO CHARGE NURSE ONLY: CPT

## 2019-08-09 PROCEDURE — 36416 COLLJ CAPILLARY BLOOD SPEC: CPT

## 2019-08-09 PROCEDURE — 85610 PROTHROMBIN TIME: CPT | Mod: QW

## 2019-08-09 NOTE — PROGRESS NOTES
ANTICOAGULATION FOLLOW-UP CLINIC VISIT    Patient Name:  Fausto Farr  Date:  8/9/2019  Contact Type:  Face to Face    SUBJECTIVE:  Patient Findings     Positives:   Other complaints    Comments:   Continues to have a lot of pain in right knee.  He is 2 1/2 weeks post-op.  Is having Physical Therapy.    The patient was assessed for   diet, medication,   missed or extra doses,   bruising or bleeding,   with no problem findings.    INR is therapeutic today.   Patient will continue same maintenance dose.   Follow up in 10 days.          Clinical Outcomes     Comments:   Continues to have a lot of pain in right knee.  He is 2 1/2 weeks post-op.  Is having Physical Therapy.    The patient was assessed for   diet, medication,   missed or extra doses,   bruising or bleeding,   with no problem findings.    INR is therapeutic today.   Patient will continue same maintenance dose.   Follow up in 10 days.             OBJECTIVE    INR Protime   Date Value Ref Range Status   08/09/2019 3.3 (A) 0.86 - 1.14 Final       ASSESSMENT / PLAN  INR assessment THER    Recheck INR In: 10 DAYS    INR Location Clinic      Anticoagulation Summary  As of 8/9/2019    INR goal:   2.5-3.5   TTR:   56.2 % (3.2 y)   INR used for dosing:   3.3 (8/9/2019)   Warfarin maintenance plan:   5 mg (5 mg x 1) every Mon, Wed, Fri; 7.5 mg (5 mg x 1.5) all other days   Full warfarin instructions:   5 mg every Mon, Wed, Fri; 7.5 mg all other days   Weekly warfarin total:   45 mg   Plan last modified:   Caryn Campos RN (6/25/2019)   Next INR check:   8/19/2019   Priority:   INR   Target end date:   Indefinite    Indications    AF (atrial fibrillation) (H) [I48.91]  S/P mitral valve replacement [Z95.2]  Long term current use of anticoagulant therapy [Z79.01]             Anticoagulation Episode Summary     INR check location:       Preferred lab:       Send INR reminders to:   SHANNA DOWELL    Comments:   5mg tabs - andrade dose // transfer from [place]  Kuldip // armen gillette // CALENDAR // right knee replaced 7/22/19      Anticoagulation Care Providers     Provider Role Specialty Phone number    Tu Reyes MD  Internal Medicine [phone]            See the Encounter Report to view Anticoagulation Flowsheet and Dosing Calendar (Go to Encounters tab in chart review, and find the Anticoagulation Therapy Visit)    INR is therapeutic today.   Patient will continue same maintenance dose.   Follow up in 10 days.        Caryn Campos RN

## 2019-08-12 NOTE — DISCHARGE SUMMARY
Orthopedic Discharge Summary    Fausto Farr MRN# [id number]   YOB: 1940 Age: 79 year old     Date of Admission:  7/19/2019  Date of Discharge:  7/26/2019 11:04 AM  Admitting Physician:  Tarsha Rosenberg MD, MD  Discharge Physician:  No att. providers found  Discharging Service:  Orthopedics     Home clinic: [facility]  Primary Provider: Tu Reyes          Admission Diagnoses:   anticoagulation for mechanical valve  Knee pain, chronic  Total knee replacement status          Discharge Diagnosis:   Patient Active Problem List   Diagnosis     Rotator cuff (capsule) sprain     Somatic dysfunction of pelvic region     Contact dermatitis and other eczema, due to unspecified cause     Ulcerative colitis (H)     Atrial fibrillation (H)     Other specified anemias     Gastric ulcer     Bilateral low back pain with left-sided sciatica     Nonallopathic lesion of lumbar region     Nonallopathic lesion of sacral region     Diaphragmatic hernia     Abdominal pain, epigastric     Malaise and fatigue     Nocturia     Nonallopathic lesion of cervical region     Nonallopathic lesion of thoracic region     Malignant neoplasm of prostate (H)     Abdominal aortic aneurysm (H)     Nonallopathic lesion of rib cage     Vitamin D deficiency disease     Anemia relative at 12.5 in 8-13      Essential hypertension, benign     Hyperlipidemia LDL goal <100     Prostate cancer (H)     Glucose intolerance (impaired glucose tolerance)     Sciatica of left side since 2000     Valvular heart disease     Heart murmur     MRSA (methicillin resistant Staphylococcus aureus)     Health Care Home     Urinary retention     Coronary artery disease     ACP (advance care planning)     AF (atrial fibrillation) (H)     S/P aortic valve replacement     S/P CABG (coronary artery bypass graft)     Stented coronary artery     Mixed hyperlipidemia     PVD (peripheral vascular disease) (H)     Abnormal ECG     Personal history  of tobacco use, presenting hazards to health     Spinal stenosis of lumbar region without neurogenic claudication     S/P mitral valve replacement     RBBB with left anterior fascicular block     S/P lumbar spinal fusion     Long term current use of anticoagulant therapy     Chronic systolic congestive heart failure (H)     GAGE (obstructive sleep apnea)     Sepsis due to group B Streptococcus (H)     Typical atrial flutter (H)     Obesity (BMI 35.0-39.9) with comorbidity (H)     Knee pain, chronic     Total knee replacement status                Discharge Disposition:   Discharged to home           Condition on Discharge:   Discharge condition: Stable           Medications Prior to Admission:     No medications prior to admission.             Discharge Medications:     No current facility-administered medications for this encounter.      Current Outpatient Medications   Medication Sig     ezetimibe (ZETIA) 10 MG tablet Take 1 tablet (10 mg) by mouth daily     fluocinonide (LIDEX) 0.05 % external ointment Apply topically 2 times daily     furosemide (LASIX) 40 MG tablet Take 0.5 tablets (20 mg) by mouth daily     mesalamine (ASACOL HD) 800 MG EC tablet Take 1 tablet (800 mg) by mouth 2 times daily     metoprolol tartrate (LOPRESSOR) 25 MG tablet Take 1 tablet (25 mg) by mouth 2 times daily     oxyCODONE (ROXICODONE) 5 MG tablet Take 1-2 tablets (5-10 mg) by mouth every 3 hours as needed     rosuvastatin (CRESTOR) 40 MG tablet Take 1 tablet (40 mg) by mouth daily     tamsulosin (FLOMAX) 0.4 MG capsule TAKE 1 CAPSULE BY MOUTH EVERY DAY     warfarin (COUMADIN) 5 MG tablet Take 5 mg (1 tablet) every Mon, Fri; 7.5 mg (1.5 tablets) all other days or as directed by INR Clinic               Brief History of Illness:   Fausto Farr is a 79 year old male who was admitted for knee osteoarthritis.          Hospital Course:     Knee pain, chronic    Total knee replacement status    * No resolved hospital problems. *               Discharge Instructions and Follow-Up:   Discharge diet: Regular   Discharge activity: Activity as tolerated   Discharge follow-up: Follow up with Dr. Jensen in 10-14 days   Outpatient therapy: None    Home Care agency: None    Supplies and equipment: None   Lines and drains: None    Wound care: None   Other instructions: None

## 2019-08-13 ENCOUNTER — TRANSFERRED RECORDS (OUTPATIENT)
Dept: HEALTH INFORMATION MANAGEMENT | Facility: CLINIC | Age: 79
End: 2019-08-13

## 2019-08-14 ENCOUNTER — OFFICE VISIT (OUTPATIENT)
Dept: PEDIATRICS | Facility: CLINIC | Age: 79
End: 2019-08-14
Payer: MEDICARE

## 2019-08-14 VITALS
HEIGHT: 66 IN | DIASTOLIC BLOOD PRESSURE: 60 MMHG | SYSTOLIC BLOOD PRESSURE: 100 MMHG | TEMPERATURE: 97.5 F | BODY MASS INDEX: 33.73 KG/M2 | OXYGEN SATURATION: 95 % | WEIGHT: 209.9 LBS | HEART RATE: 95 BPM

## 2019-08-14 DIAGNOSIS — M25.561 RIGHT KNEE PAIN, UNSPECIFIED CHRONICITY: Primary | ICD-10-CM

## 2019-08-14 DIAGNOSIS — R53.81 PHYSICAL DECONDITIONING: ICD-10-CM

## 2019-08-14 PROCEDURE — 99213 OFFICE O/P EST LOW 20 MIN: CPT | Mod: GE | Performed by: STUDENT IN AN ORGANIZED HEALTH CARE EDUCATION/TRAINING PROGRAM

## 2019-08-14 ASSESSMENT — MIFFLIN-ST. JEOR: SCORE: 1614.85

## 2019-08-14 NOTE — PROGRESS NOTES
Subjective     Fausto Farr is a 79 year old male who presents to clinic today for the following health issues:    HPI   Follow-up from last visit: reviewlabs    Taking Medication as prescribed: yes    Side Effects:  None    Medication Helping Symptoms:  NO     Ralph was most recently seen in clinic 2 weeks ago for a post hospital follow up at which point he was reporting fatigue and some shortness of breath. Notes that he is still having knee pain.  It is controlled with 1 oxy and 3 tylenol daily.  He has continued to work with PT and states that he is making good progress.  He notes that he continues to get winded when walking around and needs to sit down to rest, however this has continued to improve and his exercise tolerance has also increased.     He notes that he has had some scant bleeding from the incision site that started yesterday.  Was seen by his orthopedic doctor who placed an addition steri strip.     He notes some pain overnight which disrupts his sleep.  He will occasionally sleep in his chair during the second half of the night due to knee pain.  Asked whether melatonin might be helpful.  Has some swelling around his right knee, but denies other leg swelling or chest pain.     -------------------------------------    Patient Active Problem List   Diagnosis     Rotator cuff (capsule) sprain     Somatic dysfunction of pelvic region     Contact dermatitis and other eczema, due to unspecified cause     Ulcerative colitis (H)     Atrial fibrillation (H)     Other specified anemias     Gastric ulcer     Bilateral low back pain with left-sided sciatica     Nonallopathic lesion of lumbar region     Nonallopathic lesion of sacral region     Diaphragmatic hernia     Abdominal pain, epigastric     Malaise and fatigue     Nocturia     Nonallopathic lesion of cervical region     Nonallopathic lesion of thoracic region     Malignant neoplasm of prostate (H)     Abdominal aortic aneurysm (H)      Nonallopathic lesion of rib cage     Vitamin D deficiency disease     Anemia relative at 12.5 in 8-13      Essential hypertension, benign     Hyperlipidemia LDL goal <100     Prostate cancer (H)     Glucose intolerance (impaired glucose tolerance)     Sciatica of left side since 2000     Valvular heart disease     Heart murmur     MRSA (methicillin resistant Staphylococcus aureus)     Health Care Home     Urinary retention     Coronary artery disease     ACP (advance care planning)     AF (atrial fibrillation) (H)     S/P aortic valve replacement     S/P CABG (coronary artery bypass graft)     Stented coronary artery     Mixed hyperlipidemia     PVD (peripheral vascular disease) (H)     Abnormal ECG     Personal history of tobacco use, presenting hazards to health     Spinal stenosis of lumbar region without neurogenic claudication     S/P mitral valve replacement     RBBB with left anterior fascicular block     S/P lumbar spinal fusion     Long term current use of anticoagulant therapy     Chronic systolic congestive heart failure (H)     GAGE (obstructive sleep apnea)     Sepsis due to group B Streptococcus (H)     Typical atrial flutter (H)     Obesity (BMI 35.0-39.9) with comorbidity (H)     Knee pain, chronic     Total knee replacement status     Past Surgical History:   Procedure Laterality Date     AORTIC VALVE REPLACEMENT  1/3/06    redo AVR SJM 21mm and SJM MVR 27mm in 2013SJM 21([id number]):AVR, SJM 27 :MVR-     ARTHROPLASTY KNEE      right knee     ARTHROPLASTY KNEE Right 7/22/2019    Procedure: Right total knee arthroplasty;  Surgeon: Prasanth Jensen MD;  Location: [facility] OR     BACK SURGERY  Oct 2015    Fusion L4-5, laminectomy L2, L3     BYPASS GRAFT ARTERY CORONARY  10/2013    reimplantation of radial artery graft to RCA     C CABG, VEIN, TWO  1/3/06    Left radial to RCA, LIMA to LAD (RA to RCA reimplanted at time of 2013 surg)     CARDIAC CATHERIZATION  11/2005    Stent placed to RCA     CARDIAC  CATHERIZATION  04/2013    Occluded RCA, patent LIMA to LAD and radial graft to PDA     CARPAL TUNNEL RELEASE RT/LT  1994     COLONOSCOPY  8-22-11     CYSTOSCOPY FLEXIBLE  10/16/2013    Procedure: CYSTOSCOPY FLEXIBLE;  FLEXIBLE CYSTOSCOPY / DILATION OF URETHRA / INSERTION OF LESLIE;  Surgeon: Cooper Wallace MD;  Location:  OR     ENDOVASCULAR REPAIR, INFRARENAL ABDOMINAL AORTIC ANEURYSM/DISSECTION; MODULAR BIFURCATED PROSTHESIS  2006    AAA repair endovascular     ENT SURGERY       EP ABLATION ATRIAL FLUTTER N/A 4/22/2019    Procedure: EP Ablation Atrial Flutter;  Surgeon: Jessy Vasquez MD;  Location:  [facility]     GENITOURINARY SURGERY  6/16/08    radioactive seeding     HEAD & NECK SURGERY  1997    vocal cord polypectomy     KNEE SURGERY  2001 Right knee arthroscopy     OPTICAL TRACKING SYSTEM FUSION SPINE POSTERIOR LUMBAR THREE+ LEVELS N/A 10/29/2015    Procedure: OPTICAL TRACKING SYSTEM FUSION SPINE POSTERIOR LUMBAR THREE+ LEVELS;  Surgeon: Walt Garcia MD;  Location:  OR     PROSTATE SURGERY      radioactive seeding 6/16/08     REPAIR ANEURYSM ABDOMINAL AORTA  06/08     REPAIR VALVE MITRAL  10/16/2013    SJM 21([id number]):AVR, SJM 27 :MVRProcedure: REPAIR VALVE MITRAL;  REDO STERNOTOMY/REDO AORTIC VALVE REPLACEMENT/ MITRAL VALVE REPLACEMENT/REIMPLANTATION OF RIGHT CORONARY ARTERY BYPASS WITH RACHAEL ( ON PUMP);  Surgeon: Viet Singh MD;  Location:  OR     REPLACE VALVE AORTIC  10/16/2013    Procedure: REPLACE VALVE AORTIC;;  Surgeon: Viet Singh MD;  Location:  OR     SURGERY GENERAL IP CONSULT  05/2008 Excision aneurysm abdominal aorta     SURGERY GENERAL IP CONSULT  1997 Vocal cord polypectomy     VASCULAR SURGERY  1968, 1993     varicose vein stripping       Social History     Tobacco Use     Smoking status: Former Smoker     Packs/day: 1.00     Years: 40.00     Pack years: 40.00     Start date: 4/15/1962     Last attempt to quit:  "10/23/2002     Years since quittin.8     Smokeless tobacco: Never Used   Substance Use Topics     Alcohol use: Yes     Comment: a couple beers per week (socially)     Family History   Problem Relation Age of Onset     Coronary Artery Disease Father         CABG     Heart Disease Father         Pacemaker     Other Cancer Daughter      Heart Disease Brother      Other - See Comments Grandchild          Current Outpatient Medications   Medication Sig Dispense Refill     ezetimibe (ZETIA) 10 MG tablet Take 1 tablet (10 mg) by mouth daily 90 tablet 3     fluocinonide (LIDEX) 0.05 % external ointment Apply topically 2 times daily 60 g 2     furosemide (LASIX) 40 MG tablet Take 0.5 tablets (20 mg) by mouth daily 45 tablet 3     mesalamine (ASACOL HD) 800 MG EC tablet Take 1 tablet (800 mg) by mouth 2 times daily 180 tablet 11     metoprolol tartrate (LOPRESSOR) 25 MG tablet Take 1 tablet (25 mg) by mouth 2 times daily 180 tablet 3     oxyCODONE (ROXICODONE) 5 MG tablet Take 1-2 tablets (5-10 mg) by mouth every 3 hours as needed 30 tablet 0     rosuvastatin (CRESTOR) 40 MG tablet Take 1 tablet (40 mg) by mouth daily 90 tablet 3     tamsulosin (FLOMAX) 0.4 MG capsule TAKE 1 CAPSULE BY MOUTH EVERY DAY 90 capsule 3     warfarin (COUMADIN) 5 MG tablet Take 5 mg (1 tablet) every Mon, Fri; 7.5 mg (1.5 tablets) all other days or as directed by INR Clinic 135 tablet 3     Allergies   Allergen Reactions     Bees Anaphylaxis       -------------------------------------  Reviewed and updated as needed this visit by Provider         Review of Systems   ROS COMP: Constitutional, HEENT, cardiovascular, pulmonary, gi and gu systems are negative, except as otherwise noted.      Objective    /60 (BP Location: Right arm, Patient Position: Chair, Cuff Size: Adult Regular)   Pulse 95   Temp 97.5  F (36.4  C) (Oral)   Ht 1.676 m (5' 6\")   Wt 95.2 kg (209 lb 14.4 oz)   SpO2 95%   BMI 33.88 kg/m    Body mass index is 33.88 " "kg/m .  Physical Exam   GENERAL: healthy, alert and no distress  EYES: Eyes grossly normal to inspection, conjunctivae and sclerae normal  RESP: lungs clear to auscultation - no rales, rhonchi or wheezes  CV: regular rate and rhythm, normal S1 S2, no S3 or S4, no murmur, no peripheral edema  ABDOMEN: soft, nontender, no hepatosplenomegaly, no masses and bowel sounds normal  MS: right knee with well healing surgical incision and sleeve in place.  No gross musculoskeletal defects noted, no edema  SKIN: no suspicious lesions or rashes.  Scant bloody drainage from right knee. No purulence.  NEURO: Normal strength and tone, mentation intact and speech normal  PSYCH: mentation appears normal, affect normal/bright    Diagnostic Test Results:  Labs reviewed in Epic        Assessment & Plan     1. Post op knee pain   Physical deconditioning  Has been doing well and has increased exercise tolerance since last visit.  Able to walk out to the garden and around the neighborhood without significant shortness of breath.  Notes that he does still need to stop to rest after exerting himself.  Continues to have knee pain, which is especially bothersome overnight, he has a hard time getting comfortable in bed, but notes that he is usually able to get comfortable in his lazy boy.  Has not restarted ASA.  Will reach out to ortho to check if they are okay with him restarting.  - Pain management per ortho  - Discussed that he can try melatonin for sleep, however it sounds like his poor sleep is more related to knee pain and should continue to improve as he heals  - Will discuss with ortho if he continues to note significant bloody drainage       BMI:   Estimated body mass index is 33.88 kg/m  as calculated from the following:    Height as of this encounter: 1.676 m (5' 6\").    Weight as of this encounter: 95.2 kg (209 lb 14.4 oz).         See Patient Instructions    Return if symptoms worsen or fail to improve.    Christina Gaspar" MD Ar  [facility]      -------------------------------------  Staff note:  I discussed this case in depth with Dr. Joyner and agree with the key components of the history, assessment and plan.      Cecy Barrett MD  Internal Medicine/Pediatrics

## 2019-08-14 NOTE — PATIENT INSTRUCTIONS
Thanks for coming to clinic today!  Glad to hear that you are feeling better and getting around well!  Hope you are able to have some sweet corn!     You can try the melatonin.    If you continue to have issues with bleeding, please call your orthopedic doc.

## 2019-08-20 ENCOUNTER — ANTICOAGULATION THERAPY VISIT (OUTPATIENT)
Dept: NURSING | Facility: CLINIC | Age: 79
End: 2019-08-20
Payer: MEDICARE

## 2019-08-20 DIAGNOSIS — Z95.2 S/P MITRAL VALVE REPLACEMENT: ICD-10-CM

## 2019-08-20 DIAGNOSIS — I48.91 AF (ATRIAL FIBRILLATION) (H): ICD-10-CM

## 2019-08-20 DIAGNOSIS — Z79.01 LONG TERM CURRENT USE OF ANTICOAGULANT THERAPY: Primary | ICD-10-CM

## 2019-08-20 LAB — INR POINT OF CARE: 2.4 (ref 0.86–1.14)

## 2019-08-20 PROCEDURE — 36416 COLLJ CAPILLARY BLOOD SPEC: CPT

## 2019-08-20 PROCEDURE — 85610 PROTHROMBIN TIME: CPT | Mod: QW

## 2019-08-20 NOTE — PROGRESS NOTES
ANTICOAGULATION FOLLOW-UP CLINIC VISIT    Patient Name:  Fausto Farr  Date:  2019  Contact Type:  Face to Face    SUBJECTIVE:  Patient Findings     Positives:   Signs/symptoms of bleeding, Missed doses    Comments:   He noted small amount of bleeding from the   bottom of his incision on right knee (surgery 19)  He reduced his dose on his own on .  He has been a lot more active at home recently   and is still going to Physical Therapy.  He spoke to surgeon's office and they   were not concerned about the bleeding.          Clinical Outcomes     Comments:   He noted small amount of bleeding from the   bottom of his incision on right knee (surgery 19)  He reduced his dose on his own on .  He has been a lot more active at home recently   and is still going to Physical Therapy.  He spoke to surgeon's office and they   were not concerned about the bleeding.             OBJECTIVE    INR Protime   Date Value Ref Range Status   2019 2.4 (A) 0.86 - 1.14 Final       ASSESSMENT / PLAN  INR assessment SUB    Recheck INR In: 2 WEEKS    INR Location Clinic      Anticoagulation Summary  As of 2019    INR goal:   2.5-3.5   TTR:   56.5 % (3.3 y)   INR used for dosin.4! (2019)   Warfarin maintenance plan:   5 mg (5 mg x 1) every Mon, Wed, Fri; 7.5 mg (5 mg x 1.5) all other days   Full warfarin instructions:   5 mg every Mon, Wed, Fri; 7.5 mg all other days   Weekly warfarin total:   45 mg   No change documented:   Caryn Campos RN   Plan last modified:   Caryn Campos RN (2019)   Next INR check:   9/3/2019   Priority:   INR   Target end date:   Indefinite    Indications    AF (atrial fibrillation) (H) [I48.91]  S/P mitral valve replacement [Z95.2]  Long term current use of anticoagulant therapy [Z79.01]             Anticoagulation Episode Summary     INR check location:       Preferred lab:       Send INR reminders to:   SHANNA DOWELL    Comments:   5mg tabs - andrade  dose // transfer from [facility] // [facility] // CALENDAR // right knee replaced 7/22/19      Anticoagulation Care Providers     Provider Role Specialty Phone number    Tu Reyes MD  Internal Medicine [phone]            See the Encounter Report to view Anticoagulation Flowsheet and Dosing Calendar (Go to Encounters tab in chart review, and find the Anticoagulation Therapy Visit)    INR is subtherapeutic today.   Patient will continue same maintenance dose.   Follow up in 2 weeks.        Caryn Campos RN

## 2019-08-22 ENCOUNTER — TRANSFERRED RECORDS (OUTPATIENT)
Dept: HEALTH INFORMATION MANAGEMENT | Facility: CLINIC | Age: 79
End: 2019-08-22

## 2019-08-27 ENCOUNTER — TRANSFERRED RECORDS (OUTPATIENT)
Dept: HEALTH INFORMATION MANAGEMENT | Facility: CLINIC | Age: 79
End: 2019-08-27

## 2019-08-28 ENCOUNTER — OFFICE VISIT (OUTPATIENT)
Dept: INTERNAL MEDICINE | Facility: CLINIC | Age: 79
End: 2019-08-28
Payer: MEDICARE

## 2019-08-28 ENCOUNTER — ANTICOAGULATION THERAPY VISIT (OUTPATIENT)
Dept: ANTICOAGULATION | Facility: CLINIC | Age: 79
End: 2019-08-28
Payer: MEDICARE

## 2019-08-28 VITALS
RESPIRATION RATE: 22 BRPM | SYSTOLIC BLOOD PRESSURE: 128 MMHG | TEMPERATURE: 98.1 F | DIASTOLIC BLOOD PRESSURE: 74 MMHG | OXYGEN SATURATION: 97 % | HEIGHT: 66 IN | HEART RATE: 100 BPM | WEIGHT: 205.2 LBS | BODY MASS INDEX: 32.98 KG/M2

## 2019-08-28 DIAGNOSIS — G47.33 OSA (OBSTRUCTIVE SLEEP APNEA): ICD-10-CM

## 2019-08-28 DIAGNOSIS — I48.0 PAROXYSMAL ATRIAL FIBRILLATION (H): ICD-10-CM

## 2019-08-28 DIAGNOSIS — Z95.1 S/P CABG (CORONARY ARTERY BYPASS GRAFT): ICD-10-CM

## 2019-08-28 DIAGNOSIS — Z79.01 LONG TERM CURRENT USE OF ANTICOAGULANT THERAPY: ICD-10-CM

## 2019-08-28 DIAGNOSIS — Z95.2 S/P MITRAL VALVE REPLACEMENT: ICD-10-CM

## 2019-08-28 DIAGNOSIS — I50.22 CHRONIC SYSTOLIC CONGESTIVE HEART FAILURE (H): ICD-10-CM

## 2019-08-28 DIAGNOSIS — Z01.818 PREOP GENERAL PHYSICAL EXAM: Primary | ICD-10-CM

## 2019-08-28 DIAGNOSIS — M25.561 RIGHT KNEE PAIN, UNSPECIFIED CHRONICITY: ICD-10-CM

## 2019-08-28 DIAGNOSIS — Z96.651 HX OF TOTAL KNEE ARTHROPLASTY, RIGHT: ICD-10-CM

## 2019-08-28 DIAGNOSIS — Z95.2 S/P AORTIC VALVE REPLACEMENT: ICD-10-CM

## 2019-08-28 DIAGNOSIS — D64.9 ANEMIA, UNSPECIFIED TYPE: ICD-10-CM

## 2019-08-28 DIAGNOSIS — I25.10 CORONARY ARTERY DISEASE INVOLVING NATIVE CORONARY ARTERY OF NATIVE HEART WITHOUT ANGINA PECTORIS: ICD-10-CM

## 2019-08-28 LAB
ERYTHROCYTE [DISTWIDTH] IN BLOOD BY AUTOMATED COUNT: 13.5 % (ref 10–15)
HCT VFR BLD AUTO: 39.8 % (ref 40–53)
HGB BLD-MCNC: 12.6 G/DL (ref 13.3–17.7)
INR PPP: 2.64 (ref 0.86–1.14)
INR PPP: 3.1 (ref 0.8–1.1)
MCH RBC QN AUTO: 29.4 PG (ref 26.5–33)
MCHC RBC AUTO-ENTMCNC: 31.7 G/DL (ref 31.5–36.5)
MCV RBC AUTO: 93 FL (ref 78–100)
PLATELET # BLD AUTO: 369 10E9/L (ref 150–450)
RBC # BLD AUTO: 4.28 10E12/L (ref 4.4–5.9)
WBC # BLD AUTO: 9.6 10E9/L (ref 4–11)

## 2019-08-28 PROCEDURE — 93000 ELECTROCARDIOGRAM COMPLETE: CPT | Performed by: INTERNAL MEDICINE

## 2019-08-28 PROCEDURE — 85610 PROTHROMBIN TIME: CPT | Performed by: INTERNAL MEDICINE

## 2019-08-28 PROCEDURE — 99207 ZZC NO CHARGE NURSE ONLY: CPT

## 2019-08-28 PROCEDURE — 99215 OFFICE O/P EST HI 40 MIN: CPT | Performed by: INTERNAL MEDICINE

## 2019-08-28 PROCEDURE — 36415 COLL VENOUS BLD VENIPUNCTURE: CPT | Performed by: INTERNAL MEDICINE

## 2019-08-28 PROCEDURE — 85027 COMPLETE CBC AUTOMATED: CPT | Performed by: INTERNAL MEDICINE

## 2019-08-28 RX ORDER — FLUOCINONIDE 0.5 MG/G
OINTMENT TOPICAL 2 TIMES DAILY PRN
COMMUNITY
End: 2022-08-01

## 2019-08-28 RX ORDER — ACETAMINOPHEN 325 MG/1
325-650 TABLET ORAL EVERY 6 HOURS PRN
Qty: 250 TABLET | Refills: 11 | COMMUNITY
End: 2019-12-07

## 2019-08-28 RX ORDER — CEFAZOLIN SODIUM 2 G/100ML
2 INJECTION, SOLUTION INTRAVENOUS
Status: CANCELLED | OUTPATIENT
Start: 2019-08-28

## 2019-08-28 RX ORDER — CEFAZOLIN SODIUM 1 G/3ML
1 INJECTION, POWDER, FOR SOLUTION INTRAMUSCULAR; INTRAVENOUS SEE ADMIN INSTRUCTIONS
Status: CANCELLED | OUTPATIENT
Start: 2019-08-28

## 2019-08-28 ASSESSMENT — MIFFLIN-ST. JEOR: SCORE: 1593.53

## 2019-08-28 NOTE — PROGRESS NOTES
ANTICOAGULATION FOLLOW-UP CLINIC VISIT    Patient Name:  Fausto Farr  Date:  8/28/2019  Contact Type:  Face to Face    SUBJECTIVE:  Patient Findings     Positives:   Upcoming invasive procedure (Patient reports found out yesterday he is supposed to have knee surgery 8/29/19, Incision from recent knee surgery began oozing. )    Comments:   The patient was assessed for diet, medication, and activity level changes, missed or extra doses, bruising or bleeding, with no problem findings.  Patient in for a pre-op today, having surgery tomorrow, provider requested to have fingerstick INR. Provider requesting stat venous INR as POC was 3.1. Venous INR 2.64        Clinical Outcomes     Comments:   The patient was assessed for diet, medication, and activity level changes, missed or extra doses, bruising or bleeding, with no problem findings.  Patient in for a pre-op today, having surgery tomorrow, provider requested to have fingerstick INR. Provider requesting stat venous INR as POC was 3.1. Venous INR 2.64           OBJECTIVE    INR   Date Value Ref Range Status   08/28/2019 2.64 (H) 0.86 - 1.14 Final       ASSESSMENT / PLAN  INR assessment THER    Recheck INR In: 1 WEEK    INR Location Clinic      Anticoagulation Summary  As of 8/28/2019    INR goal:   2.5-3.5   TTR:   56.7 % (3.3 y)   INR used for dosing:   3.1 (8/28/2019)   Warfarin maintenance plan:   5 mg (5 mg x 1) every Mon, Wed, Fri; 7.5 mg (5 mg x 1.5) all other days   Full warfarin instructions:   8/28: Hold; Otherwise 5 mg every Mon, Wed, Fri; 7.5 mg all other days   Weekly warfarin total:   45 mg   Plan last modified:   Caryn Campos RN (6/25/2019)   Next INR check:   9/3/2019   Priority:   INR   Target end date:   Indefinite    Indications    AF (atrial fibrillation) (H) [I48.91]  S/P mitral valve replacement [Z95.2]  Long term current use of anticoagulant therapy [Z79.01]             Anticoagulation Episode Summary     INR check location:        Preferred lab:       Send INR reminders to:   SHANNA DOWELL    Comments:   5mg tabs - andrade dose // transfer from [facility] // [facility] [place] // CALENDAR // right knee replaced 7/22/19      Anticoagulation Care Providers     Provider Role Specialty Phone number    Tu Reyes MD  Internal Medicine [phone]            See the Encounter Report to view Anticoagulation Flowsheet and Dosing Calendar (Go to Encounters tab in chart review, and find the Anticoagulation Therapy Visit)    Dosage adjustment made based on physician directed care plan.    Cece Rios RN

## 2019-08-28 NOTE — PATIENT INSTRUCTIONS
Plan:  1. In the morning of the surgery day you take with a sip of water just these meds: Metoprolol  The rest of the meds you resume after surgery.  2. Labs today - [address]

## 2019-08-28 NOTE — PROGRESS NOTES
[facility]  [address]  [facility] [address]  [phone]  Dept: [phone]    PRE-OP EVALUATION:  Today's date: 2019    Fausto Farr (: 1940) presents for pre-operative evaluation assessment as requested by Dr. Jensen.  He requires evaluation and anesthesia risk assessment prior to undergoing surgery/procedure for treatment of oozing from right knee arthroplasty surgical site.    Fax number for surgical facility: [phone] [facility]  Primary Physician: Tu Reyes  Type of Anesthesia Anticipated: General    Patient has a Health Care Directive or Living Will:  YES we have one on file here at [facility].    Preop Questions 2019   Who is doing your surgery? neelima   What are you having done? right knee surgery   Date of Surgery/Procedure: 2019   Facility or Hospital where procedure/surgery will be performed: [facility]   1.  Do you have a history of Heart attack, stroke, stent, coronary bypass surgery, or other heart surgery? YES  - CAD, mitral valve and AOrtic valve replacement    2.  Do you ever have any pain or discomfort in your chest? No   3.  Do you have a history of  Heart Failure? UNKNOWN -    4.   Are you troubled by shortness of breath when:  walking on a level surface, or up a slight hill, or at night? No   5.  Do you currently have a cold, bronchitis or other respiratory infection? No   6.  Do you have a cough, shortness of breath, or wheezing? YES - chronic SOB not worse compare with 6 months ago    7.  Do you sometimes get pains in the calves of your legs when you walk? No   8. Do you or anyone in your family have previous history of blood clots? No   9.  Do you or does anyone in your family have a serious bleeding problem such as prolonged bleeding following surgeries or cuts? No   10. Have you ever had problems with anemia or been told to take iron pills? No   11. Have you had any abnormal blood  loss such as black, tarry or bloody stools? No   12. Have you ever had a blood transfusion? No   13. Have you or any of your relatives ever had problems with anesthesia? No   14. Do you have sleep apnea, excessive snoring or daytime drowsiness? YES - he wears a CPAP   15. Do you have any prosthetic heart valves? YES - Aortic and Mitral valve    16. Do you have prosthetic joints? YES - R knee        CC:  Preop for multiple medical problems.    HPI:    The patient is scheduled for R knee surgery with Dr. Jensen on  Aug 29.  The patient had R knee replacement on July 22. Now he has an open wound and the ortho advised for surgery tomorrow.   THE ORTHO OFFICE DIDN'T SENT ANY INFO ABOUT THE SCHEDULED SURGERY. All I have is the patient's words that the surgery will be done if INR is 2.3 or below.   No other acute complaints.    Assessment:  1. V72.83H Preop general physical exam _ I do not see any major contraindications for the patient to go through the scheduled surgery.     Fausto Farr has complex medical problems:  Mechanical aortic valve replacement, repeat mechanical aortic valve replacement, mechanical mitral valve replacement, atrial flutter s/p ablation procedure, history of stroke, obstructive sleep apnea for which he uses CPAP with sleep, coronary artery disease with previous CABG, and stress test in 4/2019 which showed no ischemia.     He is at high risk for thrombotic events if he stops the Coumadin. His cardiologist, dr Santo,  recommended bridging him with iv heparin once the INR gets lower than 1.5. The bridge was done at his recent hospital stay for the R knee arthroplasty. From what I understood from the patient,  his surgery will be performed with INR higer than 1.5.        The proposed surgical procedure is considered INTERMEDIATE, surgery risk.    For above listed surgery and anesthesia, Patient is at  MODERATE, risk for surgery/procedure and perioperative/procedure  complications.    Anticoagulation assessment  --  patient takes Warfarin  for mechanical mitral valve   -- INR 3.1 on finger test today. He will have venous INR done later today   -- suggest to recheck INR tomorrow before the surgery     Cardiovascular risk  Assessment -- low risk   -- No further cardiac work up is needed before this surgery.     (I50.22) Chronic systolic congestive heart failure (H)  Comment: stable   Plan: Continue same meds, same doses for now     (Z95.2) S/P mitral valve replacement  (Z95.2) S/P aortic valve replacement  (I48.0) Paroxysmal atrial fibrillation (H)  Comment:   Plan: continue Coumadin. If INR < 1.5 he will need admission for iv heparin bridging     (I25.10) Coronary artery disease involving native coronary artery 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  (Z95.1) S/P CABG (coronary artery bypass graft)  Comment: stable  stress test in 4/2019 which showed no ischemia.  Plan: Continue same meds, same doses for now      ECG:   -- sinus rhythm,First degree A-B block, RBBB + SALMA    Pulmonary Risk Assessment  -- The patient doesn't have chronic lung disease nor acute respiratory problems       Obstructive Sleep Apnea (or suspected sleep apnea)  -- low risk   -- Patient is to bring their home CPAP with them on the day of surgery      Anemia Assessment :  (D64.9) Anemia, unspecified type  Comment: new sec recent surgery  Plan: labs       Blood Sugar Assessment  -- patient doesn't have DM      (G47.33) GAGE (obstructive sleep apnea)  Comment:   Plan: CPAP    (M25.561) Right knee pain, unspecified chronicity  (Z96.651) Hx of total knee arthroplasty, right  Comment:   Plan: surgery      Plan:  1. In the morning of the surgery day you take with a sip of water just these meds: Metoprolol  The rest of the meds you resume after surgery.  2. Labs today - [address]    Physical exam:    Blood pressure 128/74, pulse 100, temperature 98.1  F (36.7  C), temperature source Oral, resp. rate 22, height 1.676  "m (5' 6\"), weight 93.1 kg (205 lb 3.2 oz), SpO2 97 %.   NAD, appears comfortable  Skin clear, no rashes  Neck: supple, no JVD,  no thyroidmegaly  Lymph nodes non palpable in the cervical, supraclavicular   Chest: clear to auscultation with good respiratory effort  Cardiac: S1S2, RRR, no mgr appreciated  Abdomen: soft, not tender, not distended, audible bowel sound, no hepatosplenomegaly, no palpable masses, no abdominal bruits  Extremities: no cyanosis, clubbing or edema.   Neuro: A, Ox3, no focal signs.        ROS:   as above     Patient Active Problem List   Diagnosis     Rotator cuff (capsule) sprain     Somatic dysfunction of pelvic region     Contact dermatitis and other eczema, due to unspecified cause     Ulcerative colitis (H)     Atrial fibrillation (H)     Other specified anemias     Gastric ulcer     Bilateral low back pain with left-sided sciatica     Nonallopathic lesion of lumbar region     Nonallopathic lesion of sacral region     Diaphragmatic hernia     Abdominal pain, epigastric     Malaise and fatigue     Nocturia     Nonallopathic lesion of cervical region     Nonallopathic lesion of thoracic region     Malignant neoplasm of prostate (H)     Abdominal aortic aneurysm (H)     Nonallopathic lesion of rib cage     Vitamin D deficiency disease     Anemia relative at 12.5 in 8-13      Essential hypertension, benign     Hyperlipidemia LDL goal <100     Prostate cancer (H)     Glucose intolerance (impaired glucose tolerance)     Sciatica of left side since 2000     Valvular heart disease     Heart murmur     MRSA (methicillin resistant Staphylococcus aureus)     Health Care Home     Urinary retention     Coronary artery disease     ACP (advance care planning)     AF (atrial fibrillation) (H)     S/P aortic valve replacement     S/P CABG (coronary artery bypass graft)     Stented coronary artery     Mixed hyperlipidemia     PVD (peripheral vascular disease) (H)     Abnormal ECG     Personal history of " tobacco use, presenting hazards to health     Spinal stenosis of lumbar region without neurogenic claudication     S/P mitral valve replacement     RBBB with left anterior fascicular block     S/P lumbar spinal fusion     Long term current use of anticoagulant therapy     Chronic systolic congestive heart failure (H)     GAGE (obstructive sleep apnea)     Sepsis due to group B Streptococcus (H)     Typical atrial flutter (H)     Obesity (BMI 35.0-39.9) with comorbidity (H)     Knee pain, chronic     Total knee replacement status        Past Medical History:   Diagnosis Date     Abdominal pain      Abnormal ECG     RBBB, 1st degree AVB, Left axis deviation     Anemia     currently taking iron     Arrhythmia     pac, pvc     Back pain     since 1980     BPH (benign prostatic hyperplasia)      Bruit      CAD (coronary artery disease)      Cellulitis 10/18/12     Cellulitis 05/2018    GrpB strep LLE cellulitis  negative RACHAEL for veg     Chronic venous insufficiency     bilat lower extremities     Contact dermatitis and other eczema, due to unspecified cause      Diaphragmatic hernia without mention of obstruction or gangrene      Diastolic HF (heart failure) (H)      Gastric ulcer      Glucose intolerance (impaired glucose tolerance)      Heart murmur 9/16/13    valvular heart disease     Hyperlipidemia LDL goal <100 8/6/2013     Hypertension 8/6/13     Lumbago      Malaise and fatigue      Metabolic syndrome      Mobitz (type) I (Wenckebach's) atrioventricular block     and RBBB     Nocturia 10/18/12     Nonallopathic lesion of cervical region      Nonallopathic lesion of lumbar region      Nonallopathic lesion of pelvic region, not elsewhere classified      Nonallopathic lesion of rib cage      Nonallopathic lesion of sacral region      GAGE (obstructive sleep apnea)     CPAP     Paroxysmal atrial fibrillation (H) 10/18/12     Prostate cancer (H) 2008    radiation seed, XRT      PVD (peripheral vascular disease) (H)       RBBB     1st degree AVB, RBBB, LAHB     Rotator cuff strain     and sprain     S/P AAA repair      S/P aortic valve replacement 2006    developed perivalve leak and MS, therefore redo surg 2013     S/P CABG (coronary artery bypass graft) 2006    Lima-Lad, RA-Rca     Sciatica of left side     since 2000     Sepsis due to group B Streptococcus (H) 5/19/2018     Ulcerative colitis (H)      Varicose veins of bilateral lower extremities with other complications     s/p RLE vein stripping     Vitamin D deficiency       Past Surgical History:   Procedure Laterality Date     AORTIC VALVE REPLACEMENT  1/3/06    redo AVR SJM 21mm and SJM MVR 27mm in 2013SJM 21([id number]):AVR, SJM 27 :MVR-     ARTHROPLASTY KNEE      right knee     ARTHROPLASTY KNEE Right 7/22/2019    Procedure: Right total knee arthroplasty;  Surgeon: Prasanth Jensen MD;  Location:  OR     BACK SURGERY  Oct 2015    Fusion L4-5, laminectomy L2, L3     BYPASS GRAFT ARTERY CORONARY  10/2013    reimplantation of radial artery graft to RCA     C CABG, VEIN, TWO  1/3/06    Left radial to RCA, LIMA to LAD (RA to RCA reimplanted at time of 2013 surg)     CARDIAC CATHERIZATION  11/2005    Stent placed to RCA     CARDIAC CATHERIZATION  04/2013    Occluded RCA, patent LIMA to LAD and radial graft to PDA     CARPAL TUNNEL RELEASE RT/LT  1994     COLONOSCOPY  8-22-11     CYSTOSCOPY FLEXIBLE  10/16/2013    Procedure: CYSTOSCOPY FLEXIBLE;  FLEXIBLE CYSTOSCOPY / DILATION OF URETHRA / INSERTION OF LESLIE;  Surgeon: Cooper Wallace MD;  Location:  OR     ENDOVASCULAR REPAIR, INFRARENAL ABDOMINAL AORTIC ANEURYSM/DISSECTION; MODULAR BIFURCATED PROSTHESIS  2006    AAA repair endovascular     ENT SURGERY       EP ABLATION ATRIAL FLUTTER N/A 4/22/2019    Procedure: EP Ablation Atrial Flutter;  Surgeon: Jessy Vasquez MD;  Location:  HEART CARDIAC CATH LAB     GENITOURINARY SURGERY  6/16/08    radioactive seeding     HEAD & NECK SURGERY  1997    vocal  cord polypectomy     KNEE SURGERY  2001 Right knee arthroscopy     OPTICAL TRACKING SYSTEM FUSION SPINE POSTERIOR LUMBAR THREE+ LEVELS N/A 10/29/2015    Procedure: OPTICAL TRACKING SYSTEM FUSION SPINE POSTERIOR LUMBAR THREE+ LEVELS;  Surgeon: Walt Garcia MD;  Location: [facility] OR     PROSTATE SURGERY      radioactive seeding 6/16/08     REPAIR ANEURYSM ABDOMINAL AORTA  06/08     REPAIR VALVE MITRAL  10/16/2013    SJM 21([id number] [id number]):AVR, SJM 27 :MVRProcedure: REPAIR VALVE MITRAL;  REDO STERNOTOMY/REDO AORTIC VALVE REPLACEMENT/ MITRAL VALVE REPLACEMENT/REIMPLANTATION OF RIGHT CORONARY ARTERY BYPASS WITH RACHAEL ( ON PUMP);  Surgeon: Viet Singh MD;  Location: [facility] OR     REPLACE VALVE AORTIC  10/16/2013    Procedure: REPLACE VALVE AORTIC;;  Surgeon: Viet Singh MD;  Location: [facility] OR     SURGERY GENERAL IP CONSULT  05/2008 Excision aneurysm abdominal aorta     SURGERY GENERAL IP CONSULT  1997 Vocal cord polypectomy     VASCULAR SURGERY  1968, 1993     varicose vein stripping        PSHx: No complications with prior surgeries or anesthesia     Soc Hx: No daily alcohol, no smoking     Family History   Problem Relation Age of Onset     No Known Problems Mother      Coronary Artery Disease Father         CABG     Heart Disease Father         Pacemaker     No Known Problems Brother      No Known Problems Sister      No Known Problems Son      Other Cancer Daughter      No Known Problems Daughter      Heart Disease Brother      Other - See Comments Grandchild         All: reviewed    Meds: reviewed  Current Outpatient Medications   Medication Sig Dispense Refill     acetaminophen (TYLENOL) 325 MG tablet Take 1-2 tablets (325-650 mg) by mouth every 6 hours as needed for mild pain 250 tablet 11     ezetimibe (ZETIA) 10 MG tablet Take 1 tablet (10 mg) by mouth daily 90 tablet 3     fluocinonide (LIDEX) 0.05 % external ointment Apply topically 2 times daily 60 g 2     furosemide (LASIX) 40 MG tablet Take 0.5  tablets (20 mg) by mouth daily 45 tablet 3     mesalamine (ASACOL HD) 800 MG EC tablet Take 1 tablet (800 mg) by mouth 2 times daily 180 tablet 11     metoprolol tartrate (LOPRESSOR) 25 MG tablet Take 1 tablet (25 mg) by mouth 2 times daily 180 tablet 3     rosuvastatin (CRESTOR) 40 MG tablet Take 1 tablet (40 mg) by mouth daily 90 tablet 3     tamsulosin (FLOMAX) 0.4 MG capsule TAKE 1 CAPSULE BY MOUTH EVERY DAY 90 capsule 3     warfarin (COUMADIN) 5 MG tablet Take 5 mg (1 tablet) every Mon, Fri; 7.5 mg (1.5 tablets) all other days or as directed by INR Clinic 135 tablet 3            Hui Velez MD  Internal Medicine       MEDICAL HISTORY:     Patient Active Problem List    Diagnosis Date Noted     Sepsis due to group B Streptococcus (H) 05/19/2018     Priority: High     Total knee replacement status 07/22/2019     Priority: Medium     Knee pain, chronic 07/19/2019     Priority: Medium     Obesity (BMI 35.0-39.9) with comorbidity (H) 05/30/2019     Priority: Medium     Typical atrial flutter (H) 04/15/2019     Priority: Medium     Added automatically from request for surgery [id number]       GAGE (obstructive sleep apnea) 10/02/2017     Priority: Medium     Long term current use of anticoagulant therapy 03/21/2016     Priority: Medium     Chronic systolic congestive heart failure (H) 03/21/2016     Priority: Medium     S/P lumbar spinal fusion 10/29/2015     Priority: Medium     Personal history of tobacco use, presenting hazards to health 10/28/2015     Priority: Medium     Quit 2002       Spinal stenosis of lumbar region without neurogenic claudication 10/28/2015     Priority: Medium     S/P mitral valve replacement 10/28/2015     Priority: Medium     RBBB with left anterior fascicular block 10/28/2015     Priority: Medium     S/P aortic valve replacement      Priority: Medium     developed perivalve leak and MS, therefore redo surg 2013       S/P CABG (coronary artery bypass graft)      Priority: Medium     with  AVR Aguirre-Lad, RA-Rca       Stented coronary artery      Priority: Medium     Mixed hyperlipidemia      Priority: Medium     Diagnosis updated by automated process. Provider to review and confirm.       PVD (peripheral vascular disease) (H)      Priority: Medium     Abnormal ECG      Priority: Medium     RBBB, 1st degree AVB, Left axis deviation       ACP (advance care planning) 10/09/2015     Priority: Medium     Advance Care Planning 10/9/2015: Receipt of ACP document:  Received: Health Care Directive which was witnessed or notarized on 9-18-15.  Document not previously scanned.  Validation form completed and sent with document to be scanned.  Code Status reflects choices in most recent ACP document.  Confirmed/documented designated decision maker(s).  Added by Aster Rios RN Advance Care Planning Liaison with Willa Khan  Advance Care Planning 10/9/2015: ACP Review and Resources Provided:  Reviewed chart for advance care plan.  Fausto Farr has no plan or code status on file however states presence of ACP document. Copy requested. Confirmed code status reflects current choices pending receipt of document/advance care plan review. Confirmed/documented legally designated decision maker(s). Added by Aster Rios RN Advance Care Planning Liaison with Willa Khan           Urinary retention 12/04/2013     Priority: Medium     Health Care Home 10/24/2013     Priority: Medium     Status:  Closed  Care Coordinator:  Susan Carrillo RN CCM    See Letters for [facility] Care Plan         MRSA (methicillin resistant Staphylococcus aureus) 10/17/2013     Priority: Medium     Nares 10-16-13       Heart murmur      Priority: Medium     Valvular heart disease 09/16/2013     Priority: Medium     Mitral and Aortic Valve       Vitamin D deficiency disease 08/06/2013     Priority: Medium     Anemia relative at 12.5 in 8-13  08/06/2013     Priority: Medium     Essential hypertension, benign 08/06/2013     Priority: Medium      Hyperlipidemia LDL goal <100 08/06/2013     Priority: Medium     Prostate cancer (H) 08/06/2013     Priority: Medium     Glucose intolerance (impaired glucose tolerance) 08/06/2013     Priority: Medium     Sciatica of left side since 2000 08/06/2013     Priority: Medium     Somatic dysfunction of pelvic region 10/18/2012     Priority: Medium     Problem list name updated by automated process. Provider to review       Contact dermatitis and other eczema, due to unspecified cause 10/18/2012     Priority: Medium     Ulcerative colitis (H) 10/18/2012     Priority: Medium     Problem list name updated by automated process. Provider to review       Atrial fibrillation (H) 10/18/2012     Priority: Medium     Other specified anemias 10/18/2012     Priority: Medium     Gastric ulcer 10/18/2012     Priority: Medium     Problem list name updated by automated process. Provider to review       Bilateral low back pain with left-sided sciatica 10/18/2012     Priority: Medium     Nonallopathic lesion of lumbar region 10/18/2012     Priority: Medium     Problem list name updated by automated process. Provider to review       Nonallopathic lesion of sacral region 10/18/2012     Priority: Medium     Problem list name updated by automated process. Provider to review       Diaphragmatic hernia 10/18/2012     Priority: Medium     Problem list name updated by automated process. Provider to review       Abdominal pain, epigastric 10/18/2012     Priority: Medium     Malaise and fatigue 10/18/2012     Priority: Medium     Problem list name updated by automated process. Provider to review       Nocturia 10/18/2012     Priority: Medium     Nonallopathic lesion of cervical region 10/18/2012     Priority: Medium     Problem list name updated by automated process. Provider to review       Nonallopathic lesion of thoracic region 10/18/2012     Priority: Medium     Problem list name updated by automated process. Provider to review        Malignant neoplasm of prostate (H) 10/18/2012     Priority: Medium     Abdominal aortic aneurysm (H) 10/18/2012     Priority: Medium     6.5 cm 4/2015 u/s  Problem list name updated by automated process. Provider to review       Nonallopathic lesion of rib cage 10/18/2012     Priority: Medium     Problem list name updated by automated process. Provider to review       Coronary artery disease 10/18/2012     Priority: Medium     AF (atrial fibrillation) (H) 10/18/2012     Priority: Medium     Rotator cuff (capsule) sprain 05/10/2010     Priority: Medium      Past Medical History:   Diagnosis Date     Abdominal pain      Abnormal ECG     RBBB, 1st degree AVB, Left axis deviation     Anemia     currently taking iron     Arrhythmia     pac, pvc     Back pain     since 1980     BPH (benign prostatic hyperplasia)      Bruit      CAD (coronary artery disease)      Cellulitis 10/18/12     Cellulitis 05/2018    GrpB strep LLE cellulitis  negative RACHAEL for veg     Chronic venous insufficiency     bilat lower extremities     Contact dermatitis and other eczema, due to unspecified cause      Diaphragmatic hernia without mention of obstruction or gangrene      Diastolic HF (heart failure) (H)      Gastric ulcer      Glucose intolerance (impaired glucose tolerance)      Heart murmur 9/16/13    valvular heart disease     Hyperlipidemia LDL goal <100 8/6/2013     Hypertension 8/6/13     Lumbago      Malaise and fatigue      Metabolic syndrome      Mobitz (type) I (Wenckebach's) atrioventricular block     and RBBB     Nocturia 10/18/12     Nonallopathic lesion of cervical region      Nonallopathic lesion of lumbar region      Nonallopathic lesion of pelvic region, not elsewhere classified      Nonallopathic lesion of rib cage      Nonallopathic lesion of sacral region      GAGE (obstructive sleep apnea)     CPAP     Paroxysmal atrial fibrillation (H) 10/18/12     Prostate cancer (H) 2008    radiation seed, XRT      PVD (peripheral  vascular disease) (H)      RBBB     1st degree AVB, RBBB, LAHB     Rotator cuff strain     and sprain     S/P AAA repair      S/P aortic valve replacement 2006    developed perivalve leak and MS, therefore redo surg 2013     S/P CABG (coronary artery bypass graft) 2006    Lima-Lad, RA-Rca     Sciatica of left side     since 2000     Sepsis due to group B Streptococcus (H) 5/19/2018     Ulcerative colitis (H)      Varicose veins of bilateral lower extremities with other complications     s/p RLE vein stripping     Vitamin D deficiency      Past Surgical History:   Procedure Laterality Date     AORTIC VALVE REPLACEMENT  1/3/06    redo AVR SJM 21mm and SJM MVR 27mm in 2013SJM 21(AGFN 756):AVR, SJM 27 :MVR-     ARTHROPLASTY KNEE      right knee     ARTHROPLASTY KNEE Right 7/22/2019    Procedure: Right total knee arthroplasty;  Surgeon: Prasanth Jensen MD;  Location:  OR     BACK SURGERY  Oct 2015    Fusion L4-5, laminectomy L2, L3     BYPASS GRAFT ARTERY CORONARY  10/2013    reimplantation of radial artery graft to RCA     C CABG, VEIN, TWO  1/3/06    Left radial to RCA, LIMA to LAD (RA to RCA reimplanted at time of 2013 surg)     CARDIAC CATHERIZATION  11/2005    Stent placed to RCA     CARDIAC CATHERIZATION  04/2013    Occluded RCA, patent LIMA to LAD and radial graft to PDA     CARPAL TUNNEL RELEASE RT/LT  1994     COLONOSCOPY  8-22-11     CYSTOSCOPY FLEXIBLE  10/16/2013    Procedure: CYSTOSCOPY FLEXIBLE;  FLEXIBLE CYSTOSCOPY / DILATION OF URETHRA / INSERTION OF LESLIE;  Surgeon: Cooper Wallace MD;  Location:  OR     ENDOVASCULAR REPAIR, INFRARENAL ABDOMINAL AORTIC ANEURYSM/DISSECTION; MODULAR BIFURCATED PROSTHESIS  2006    AAA repair endovascular     ENT SURGERY       EP ABLATION ATRIAL FLUTTER N/A 4/22/2019    Procedure: EP Ablation Atrial Flutter;  Surgeon: Jessy Vasquez MD;  Location:  HEART CARDIAC CATH LAB     GENITOURINARY SURGERY  6/16/08    radioactive seeding     HEAD &  NECK SURGERY  1997    vocal cord polypectomy     KNEE SURGERY  2001 Right knee arthroscopy     OPTICAL TRACKING SYSTEM FUSION SPINE POSTERIOR LUMBAR THREE+ LEVELS N/A 10/29/2015    Procedure: OPTICAL TRACKING SYSTEM FUSION SPINE POSTERIOR LUMBAR THREE+ LEVELS;  Surgeon: Walt Garcia MD;  Location: [facility]     PROSTATE SURGERY      radioactive seeding 6/16/08     REPAIR ANEURYSM ABDOMINAL AORTA  06/08     REPAIR VALVE MITRAL  10/16/2013    SJM 21(AGFN 756):AVR, SJM 27 :MVRProcedure: REPAIR VALVE MITRAL;  REDO STERNOTOMY/REDO AORTIC VALVE REPLACEMENT/ MITRAL VALVE REPLACEMENT/REIMPLANTATION OF RIGHT CORONARY ARTERY BYPASS WITH RACHAEL ( ON PUMP);  Surgeon: Viet Singh MD;  Location: [facility] OR     REPLACE VALVE AORTIC  10/16/2013    Procedure: REPLACE VALVE AORTIC;;  Surgeon: Viet Singh MD;  Location: [facility]     SURGERY GENERAL IP CONSULT  05/2008 Excision aneurysm abdominal aorta     SURGERY GENERAL IP CONSULT  1997 Vocal cord polypectomy     VASCULAR SURGERY  1968, 1993     varicose vein stripping     Current Outpatient Medications   Medication Sig Dispense Refill     ezetimibe (ZETIA) 10 MG tablet Take 1 tablet (10 mg) by mouth daily 90 tablet 3     fluocinonide (LIDEX) 0.05 % external ointment Apply topically 2 times daily 60 g 2     furosemide (LASIX) 40 MG tablet Take 0.5 tablets (20 mg) by mouth daily 45 tablet 3     mesalamine (ASACOL HD) 800 MG EC tablet Take 1 tablet (800 mg) by mouth 2 times daily 180 tablet 11     metoprolol tartrate (LOPRESSOR) 25 MG tablet Take 1 tablet (25 mg) by mouth 2 times daily 180 tablet 3     oxyCODONE (ROXICODONE) 5 MG tablet Take 1-2 tablets (5-10 mg) by mouth every 3 hours as needed 30 tablet 0     rosuvastatin (CRESTOR) 40 MG tablet Take 1 tablet (40 mg) by mouth daily 90 tablet 3     tamsulosin (FLOMAX) 0.4 MG capsule TAKE 1 CAPSULE BY MOUTH EVERY DAY 90 capsule 3     warfarin (COUMADIN) 5 MG tablet Take 5 mg (1 tablet) every Mon, Fri; 7.5 mg (1.5  tablets) all other days or as directed by INR Clinic 135 tablet 3     OTC products: None, except as noted above    Allergies   Allergen Reactions     Bees Anaphylaxis      Latex Allergy: NO    Social History     Tobacco Use     Smoking status: Former Smoker     Packs/day: 1.00     Years: 40.00     Pack years: 40.00     Start date: 4/15/1962     Last attempt to quit: 10/23/2002     Years since quittin.8     Smokeless tobacco: Never Used   Substance Use Topics     Alcohol use: Yes     Comment: a couple beers per week (socially)     History   Drug Use No           Recent Labs   Lab Test 19  0834 19  0957 19  1615  19  0648 19  0623  17  1244  10/19/13  0430   HGB  --   --  10.6*  --  9.6* 10.4*   < > 12.9*   < > 8.0*   PLT  --   --  652*  --  232 238   < > 282   < > 155   INR 2.4* 3.3*  --    < > 1.41* 1.11   < >  --    < > 1.23*   NA  --   --   --   --  133 136   < > 139   < > 134   POTASSIUM  --   --   --   --  4.3 3.9   < > 4.5   < > 4.8   CR  --   --   --   --  1.10 1.02   < > 1.00   < > 1.22   A1C  --   --   --   --   --   --   --  5.9  --  6.8*    < > = values in this interval not displayed.        Signed Electronically by: Hui Wheeler MD    Copy of this evaluation report is provided to requesting physician.    [facility] Guidelines    Revised Cardiac Risk Index

## 2019-08-28 NOTE — NURSING NOTE
"/74 (BP Location: Right arm, Patient Position: Sitting, Cuff Size: Adult Large)   Pulse 100   Temp 98.1  F (36.7  C) (Oral)   Resp 22   Ht 1.676 m (5' 6\")   Wt 93.1 kg (205 lb 3.2 oz)   SpO2 97%   BMI 33.12 kg/m    Fouzia Nguyen CMA    "

## 2019-08-28 NOTE — OR NURSING
Lab results from today:  INR-2.64, Hgb 12.6, hematocrit 39.8 & RBC 4.28.  Pt has history of MRSA.  He will be in contact isolation while here.  Dr. Jensen's office notified.

## 2019-08-29 ENCOUNTER — ANESTHESIA (OUTPATIENT)
Dept: SURGERY | Facility: CLINIC | Age: 79
End: 2019-08-29
Payer: MEDICARE

## 2019-08-29 ENCOUNTER — HOSPITAL ENCOUNTER (OUTPATIENT)
Facility: CLINIC | Age: 79
Discharge: HOME | End: 2019-08-29
Attending: ORTHOPAEDIC SURGERY | Admitting: ORTHOPAEDIC SURGERY
Payer: MEDICARE

## 2019-08-29 ENCOUNTER — ANESTHESIA EVENT (OUTPATIENT)
Dept: SURGERY | Facility: CLINIC | Age: 79
End: 2019-08-29
Payer: MEDICARE

## 2019-08-29 VITALS
DIASTOLIC BLOOD PRESSURE: 96 MMHG | HEIGHT: 66 IN | WEIGHT: 203 LBS | BODY MASS INDEX: 32.62 KG/M2 | TEMPERATURE: 98.2 F | SYSTOLIC BLOOD PRESSURE: 162 MMHG | RESPIRATION RATE: 16 BRPM | OXYGEN SATURATION: 98 % | HEART RATE: 80 BPM

## 2019-08-29 DIAGNOSIS — Z96.651 STATUS POST TOTAL RIGHT KNEE REPLACEMENT: Primary | ICD-10-CM

## 2019-08-29 LAB
CREAT SERPL-MCNC: 0.98 MG/DL (ref 0.66–1.25)
GFR SERPL CREATININE-BSD FRML MDRD: 74 ML/MIN/{1.73_M2}
GRAM STN SPEC: ABNORMAL
INR PPP: 2.31 (ref 0.86–1.14)
Lab: ABNORMAL
Lab: ABNORMAL
POTASSIUM SERPL-SCNC: 3.9 MMOL/L (ref 3.4–5.3)
SPECIMEN SOURCE: ABNORMAL
SPECIMEN SOURCE: ABNORMAL

## 2019-08-29 PROCEDURE — 87070 CULTURE OTHR SPECIMN AEROBIC: CPT | Performed by: ORTHOPAEDIC SURGERY

## 2019-08-29 PROCEDURE — 87181 SC STD AGAR DILUTION PER AGT: CPT | Performed by: ORTHOPAEDIC SURGERY

## 2019-08-29 PROCEDURE — 27210794 ZZH OR GENERAL SUPPLY STERILE: Performed by: ORTHOPAEDIC SURGERY

## 2019-08-29 PROCEDURE — 40000306 ZZH STATISTIC PRE PROC ASSESS II: Performed by: ORTHOPAEDIC SURGERY

## 2019-08-29 PROCEDURE — 25000132 ZZH RX MED GY IP 250 OP 250 PS 637: Mod: GY | Performed by: ANESTHESIOLOGY

## 2019-08-29 PROCEDURE — 87176 TISSUE HOMOGENIZATION CULTR: CPT | Performed by: ORTHOPAEDIC SURGERY

## 2019-08-29 PROCEDURE — 25000128 H RX IP 250 OP 636: Performed by: ANESTHESIOLOGY

## 2019-08-29 PROCEDURE — 85610 PROTHROMBIN TIME: CPT | Performed by: ANESTHESIOLOGY

## 2019-08-29 PROCEDURE — 82565 ASSAY OF CREATININE: CPT | Performed by: ANESTHESIOLOGY

## 2019-08-29 PROCEDURE — 37000009 ZZH ANESTHESIA TECHNICAL FEE, EACH ADDTL 15 MIN: Performed by: ORTHOPAEDIC SURGERY

## 2019-08-29 PROCEDURE — 25800030 ZZH RX IP 258 OP 636

## 2019-08-29 PROCEDURE — 36000058 ZZH SURGERY LEVEL 3 EA 15 ADDTL MIN: Performed by: ORTHOPAEDIC SURGERY

## 2019-08-29 PROCEDURE — 25000125 ZZHC RX 250: Performed by: ANESTHESIOLOGY

## 2019-08-29 PROCEDURE — 87186 SC STD MICRODIL/AGAR DIL: CPT | Mod: XU | Performed by: ORTHOPAEDIC SURGERY

## 2019-08-29 PROCEDURE — 37000008 ZZH ANESTHESIA TECHNICAL FEE, 1ST 30 MIN: Performed by: ORTHOPAEDIC SURGERY

## 2019-08-29 PROCEDURE — 87075 CULTR BACTERIA EXCEPT BLOOD: CPT | Mod: 91 | Performed by: ORTHOPAEDIC SURGERY

## 2019-08-29 PROCEDURE — 36000056 ZZH SURGERY LEVEL 3 1ST 30 MIN: Performed by: ORTHOPAEDIC SURGERY

## 2019-08-29 PROCEDURE — 25000128 H RX IP 250 OP 636: Performed by: ORTHOPAEDIC SURGERY

## 2019-08-29 PROCEDURE — 87076 CULTURE ANAEROBE IDENT EACH: CPT | Performed by: ORTHOPAEDIC SURGERY

## 2019-08-29 PROCEDURE — 87077 CULTURE AEROBIC IDENTIFY: CPT | Performed by: ORTHOPAEDIC SURGERY

## 2019-08-29 PROCEDURE — 84132 ASSAY OF SERUM POTASSIUM: CPT | Performed by: ANESTHESIOLOGY

## 2019-08-29 PROCEDURE — 87205 SMEAR GRAM STAIN: CPT | Performed by: ORTHOPAEDIC SURGERY

## 2019-08-29 PROCEDURE — 36415 COLL VENOUS BLD VENIPUNCTURE: CPT | Performed by: ANESTHESIOLOGY

## 2019-08-29 PROCEDURE — 25800030 ZZH RX IP 258 OP 636: Performed by: ANESTHESIOLOGY

## 2019-08-29 PROCEDURE — 71000014 ZZH RECOVERY PHASE 1 LEVEL 2 FIRST HR: Performed by: ORTHOPAEDIC SURGERY

## 2019-08-29 PROCEDURE — 71000027 ZZH RECOVERY PHASE 2 EACH 15 MINS: Performed by: ORTHOPAEDIC SURGERY

## 2019-08-29 PROCEDURE — 25000128 H RX IP 250 OP 636

## 2019-08-29 RX ORDER — SODIUM CHLORIDE, SODIUM LACTATE, POTASSIUM CHLORIDE, CALCIUM CHLORIDE 600; 310; 30; 20 MG/100ML; MG/100ML; MG/100ML; MG/100ML
INJECTION, SOLUTION INTRAVENOUS CONTINUOUS
Status: DISCONTINUED | OUTPATIENT
Start: 2019-08-29 | End: 2019-08-29 | Stop reason: HOSPADM

## 2019-08-29 RX ORDER — ONDANSETRON 4 MG/1
4 TABLET, ORALLY DISINTEGRATING ORAL EVERY 30 MIN PRN
Status: DISCONTINUED | OUTPATIENT
Start: 2019-08-29 | End: 2019-08-29 | Stop reason: HOSPADM

## 2019-08-29 RX ORDER — ONDANSETRON 2 MG/ML
4 INJECTION INTRAMUSCULAR; INTRAVENOUS EVERY 30 MIN PRN
Status: DISCONTINUED | OUTPATIENT
Start: 2019-08-29 | End: 2019-08-29 | Stop reason: HOSPADM

## 2019-08-29 RX ORDER — ACETAMINOPHEN 325 MG/1
975 TABLET ORAL ONCE
Status: COMPLETED | OUTPATIENT
Start: 2019-08-29 | End: 2019-08-29

## 2019-08-29 RX ORDER — CEFAZOLIN SODIUM 2 G/100ML
INJECTION, SOLUTION INTRAVENOUS PRN
Status: DISCONTINUED | OUTPATIENT
Start: 2019-08-29 | End: 2019-08-29

## 2019-08-29 RX ORDER — MEPERIDINE HYDROCHLORIDE 25 MG/ML
12.5 INJECTION INTRAMUSCULAR; INTRAVENOUS; SUBCUTANEOUS
Status: DISCONTINUED | OUTPATIENT
Start: 2019-08-29 | End: 2019-08-29 | Stop reason: HOSPADM

## 2019-08-29 RX ORDER — FENTANYL CITRATE 50 UG/ML
25-50 INJECTION, SOLUTION INTRAMUSCULAR; INTRAVENOUS EVERY 5 MIN PRN
Status: DISCONTINUED | OUTPATIENT
Start: 2019-08-29 | End: 2019-08-29 | Stop reason: HOSPADM

## 2019-08-29 RX ORDER — NALOXONE HYDROCHLORIDE 0.4 MG/ML
.1-.4 INJECTION, SOLUTION INTRAMUSCULAR; INTRAVENOUS; SUBCUTANEOUS
Status: DISCONTINUED | OUTPATIENT
Start: 2019-08-29 | End: 2019-08-29

## 2019-08-29 RX ORDER — HYDROMORPHONE HYDROCHLORIDE 1 MG/ML
.3-.5 INJECTION, SOLUTION INTRAMUSCULAR; INTRAVENOUS; SUBCUTANEOUS EVERY 10 MIN PRN
Status: DISCONTINUED | OUTPATIENT
Start: 2019-08-29 | End: 2019-08-29 | Stop reason: HOSPADM

## 2019-08-29 RX ORDER — OXYCODONE HYDROCHLORIDE 5 MG/1
5 TABLET ORAL EVERY 6 HOURS PRN
Qty: 12 TABLET | Refills: 0 | Status: SHIPPED | OUTPATIENT
Start: 2019-08-29 | End: 2019-10-15

## 2019-08-29 RX ORDER — HYDROMORPHONE HYDROCHLORIDE 1 MG/ML
.3-.5 INJECTION, SOLUTION INTRAMUSCULAR; INTRAVENOUS; SUBCUTANEOUS EVERY 10 MIN PRN
Status: DISCONTINUED | OUTPATIENT
Start: 2019-08-29 | End: 2019-08-29

## 2019-08-29 RX ORDER — LIDOCAINE 40 MG/G
CREAM TOPICAL
Status: DISCONTINUED | OUTPATIENT
Start: 2019-08-29 | End: 2019-08-29 | Stop reason: HOSPADM

## 2019-08-29 RX ORDER — MEPERIDINE HYDROCHLORIDE 25 MG/ML
12.5 INJECTION INTRAMUSCULAR; INTRAVENOUS; SUBCUTANEOUS
Status: DISCONTINUED | OUTPATIENT
Start: 2019-08-29 | End: 2019-08-29

## 2019-08-29 RX ORDER — FENTANYL CITRATE 50 UG/ML
25-50 INJECTION, SOLUTION INTRAMUSCULAR; INTRAVENOUS
Status: DISCONTINUED | OUTPATIENT
Start: 2019-08-29 | End: 2019-08-29 | Stop reason: HOSPADM

## 2019-08-29 RX ORDER — CEFAZOLIN SODIUM 2 G/100ML
2 INJECTION, SOLUTION INTRAVENOUS
Status: DISCONTINUED | OUTPATIENT
Start: 2019-08-29 | End: 2019-08-29 | Stop reason: HOSPADM

## 2019-08-29 RX ORDER — DEXAMETHASONE SODIUM PHOSPHATE 4 MG/ML
INJECTION, SOLUTION INTRA-ARTICULAR; INTRALESIONAL; INTRAMUSCULAR; INTRAVENOUS; SOFT TISSUE PRN
Status: DISCONTINUED | OUTPATIENT
Start: 2019-08-29 | End: 2019-08-29

## 2019-08-29 RX ORDER — NALOXONE HYDROCHLORIDE 0.4 MG/ML
.1-.4 INJECTION, SOLUTION INTRAMUSCULAR; INTRAVENOUS; SUBCUTANEOUS
Status: DISCONTINUED | OUTPATIENT
Start: 2019-08-29 | End: 2019-08-29 | Stop reason: HOSPADM

## 2019-08-29 RX ORDER — BUPIVACAINE HYDROCHLORIDE 5 MG/ML
INJECTION, SOLUTION PERINEURAL PRN
Status: DISCONTINUED | OUTPATIENT
Start: 2019-08-29 | End: 2019-08-29 | Stop reason: HOSPADM

## 2019-08-29 RX ORDER — CEFAZOLIN SODIUM 1 G/3ML
1 INJECTION, POWDER, FOR SOLUTION INTRAMUSCULAR; INTRAVENOUS SEE ADMIN INSTRUCTIONS
Status: DISCONTINUED | OUTPATIENT
Start: 2019-08-29 | End: 2019-08-29 | Stop reason: HOSPADM

## 2019-08-29 RX ORDER — GABAPENTIN 300 MG/1
300 CAPSULE ORAL ONCE
Status: COMPLETED | OUTPATIENT
Start: 2019-08-29 | End: 2019-08-29

## 2019-08-29 RX ORDER — KETOROLAC TROMETHAMINE 30 MG/ML
INJECTION, SOLUTION INTRAMUSCULAR; INTRAVENOUS PRN
Status: DISCONTINUED | OUTPATIENT
Start: 2019-08-29 | End: 2019-08-29

## 2019-08-29 RX ORDER — PROPOFOL 10 MG/ML
INJECTION, EMULSION INTRAVENOUS PRN
Status: DISCONTINUED | OUTPATIENT
Start: 2019-08-29 | End: 2019-08-29

## 2019-08-29 RX ADMIN — FENTANYL CITRATE 25 MCG: 50 INJECTION, SOLUTION INTRAMUSCULAR; INTRAVENOUS at 13:54

## 2019-08-29 RX ADMIN — GABAPENTIN 300 MG: 300 CAPSULE ORAL at 11:52

## 2019-08-29 RX ADMIN — FENTANYL CITRATE 25 MCG: 50 INJECTION, SOLUTION INTRAMUSCULAR; INTRAVENOUS at 13:41

## 2019-08-29 RX ADMIN — PHENYLEPHRINE HYDROCHLORIDE 100 MCG: 10 INJECTION INTRAVENOUS at 12:54

## 2019-08-29 RX ADMIN — DEXAMETHASONE SODIUM PHOSPHATE 4 MG: 4 INJECTION, SOLUTION INTRA-ARTICULAR; INTRALESIONAL; INTRAMUSCULAR; INTRAVENOUS; SOFT TISSUE at 12:52

## 2019-08-29 RX ADMIN — CEFAZOLIN SODIUM 2 G: 2 INJECTION, SOLUTION INTRAVENOUS at 13:00

## 2019-08-29 RX ADMIN — KETOROLAC TROMETHAMINE 15 MG: 30 INJECTION, SOLUTION INTRAMUSCULAR at 13:34

## 2019-08-29 RX ADMIN — ACETAMINOPHEN 975 MG: 325 TABLET ORAL at 11:52

## 2019-08-29 RX ADMIN — FENTANYL CITRATE 100 MCG: 50 INJECTION, SOLUTION INTRAMUSCULAR; INTRAVENOUS at 12:52

## 2019-08-29 RX ADMIN — PROPOFOL 100 MG: 10 INJECTION, EMULSION INTRAVENOUS at 12:52

## 2019-08-29 RX ADMIN — SODIUM CHLORIDE, POTASSIUM CHLORIDE, SODIUM LACTATE AND CALCIUM CHLORIDE: 600; 310; 30; 20 INJECTION, SOLUTION INTRAVENOUS at 12:05

## 2019-08-29 RX ADMIN — LIDOCAINE HYDROCHLORIDE 50 MG: 10 INJECTION, SOLUTION EPIDURAL; INFILTRATION; INTRACAUDAL; PERINEURAL at 12:52

## 2019-08-29 RX ADMIN — MIDAZOLAM 2 MG: 1 INJECTION INTRAMUSCULAR; INTRAVENOUS at 12:45

## 2019-08-29 ASSESSMENT — ENCOUNTER SYMPTOMS: DYSRHYTHMIAS: 1

## 2019-08-29 ASSESSMENT — MIFFLIN-ST. JEOR: SCORE: 1583.3

## 2019-08-29 NOTE — ANESTHESIA PREPROCEDURE EVALUATION
Anesthesia Pre-Procedure Evaluation    Patient: Fausto Farr   MRN: [id number] : 1940          Preoperative Diagnosis: .    Procedure(s):  INCISION AND DRAINAGE, KNEE W/ Secondary Wound Closure    Past Medical History:   Diagnosis Date     Abdominal pain      Abnormal ECG     RBBB, 1st degree AVB, Left axis deviation     Anemia     currently taking iron     Arrhythmia     pac, pvc     Back pain     since      BPH (benign prostatic hyperplasia)      Bruit      CAD (coronary artery disease)      Cellulitis 10/18/12     Cellulitis 2018    GrpB strep LLE cellulitis  negative RACHAEL for veg     Chronic venous insufficiency     bilat lower extremities     Contact dermatitis and other eczema, due to unspecified cause      Diaphragmatic hernia without mention of obstruction or gangrene      Diastolic HF (heart failure) (H)      Gastric ulcer      Glucose intolerance (impaired glucose tolerance)      Heart murmur 13    valvular heart disease     Hyperlipidemia LDL goal <100 2013     Hypertension 13     Lumbago      Malaise and fatigue      Metabolic syndrome      Mobitz (type) I (Wenckebach's) atrioventricular block     and RBBB     Nocturia 10/18/12     Nonallopathic lesion of cervical region      Nonallopathic lesion of lumbar region      Nonallopathic lesion of pelvic region, not elsewhere classified      Nonallopathic lesion of rib cage      Nonallopathic lesion of sacral region      GAGE (obstructive sleep apnea)     CPAP     Paroxysmal atrial fibrillation (H) 10/18/12     Prostate cancer (H)     radiation seed, XRT      PVD (peripheral vascular disease) (H)      RBBB     1st degree AVB, RBBB, LAHB     Rotator cuff strain     and sprain     S/P AAA repair      S/P aortic valve replacement     developed perivalve leak and MS, therefore redo surg      S/P CABG (coronary artery bypass graft) 2006    Lima-Lad, RA-Rca     Sciatica of left side     since      Sepsis due to group B  Streptococcus (H) 5/19/2018     Ulcerative colitis (H)      Varicose veins of bilateral lower extremities with other complications     s/p RLE vein stripping     Vitamin D deficiency      Past Surgical History:   Procedure Laterality Date     AORTIC VALVE REPLACEMENT  1/3/06    redo AVR SJM 21mm and SJM MVR 27mm in 2013SJM 21([id number]):AVR, SJM 27 :MVR-     ARTHROPLASTY KNEE      right knee     ARTHROPLASTY KNEE Right 7/22/2019    Procedure: Right total knee arthroplasty;  Surgeon: Prasanth Jensen MD;  Location:  OR     BACK SURGERY  Oct 2015    Fusion L4-5, laminectomy L2, L3     BYPASS GRAFT ARTERY CORONARY  10/2013    reimplantation of radial artery graft to RCA     C CABG, VEIN, TWO  1/3/06    Left radial to RCA, LIMA to LAD (RA to RCA reimplanted at time of 2013 surg)     CARDIAC CATHERIZATION  11/2005    Stent placed to RCA     CARDIAC CATHERIZATION  04/2013    Occluded RCA, patent LIMA to LAD and radial graft to PDA     CARPAL TUNNEL RELEASE RT/LT  1994     COLONOSCOPY  8-22-11     CYSTOSCOPY FLEXIBLE  10/16/2013    Procedure: CYSTOSCOPY FLEXIBLE;  FLEXIBLE CYSTOSCOPY / DILATION OF URETHRA / INSERTION OF LESLIE;  Surgeon: Cooper Wallace MD;  Location:  OR     ENDOVASCULAR REPAIR, INFRARENAL ABDOMINAL AORTIC ANEURYSM/DISSECTION; MODULAR BIFURCATED PROSTHESIS  2006    AAA repair endovascular     ENT SURGERY       EP ABLATION ATRIAL FLUTTER N/A 4/22/2019    Procedure: EP Ablation Atrial Flutter;  Surgeon: Jessy Vasquez MD;  Location:  [facility]     GENITOURINARY SURGERY  6/16/08    radioactive seeding     HEAD & NECK SURGERY  1997    vocal cord polypectomy     KNEE SURGERY  2001 Right knee arthroscopy     OPTICAL TRACKING SYSTEM FUSION SPINE POSTERIOR LUMBAR THREE+ LEVELS N/A 10/29/2015    Procedure: OPTICAL TRACKING SYSTEM FUSION SPINE POSTERIOR LUMBAR THREE+ LEVELS;  Surgeon: Walt Garcia MD;  Location:  OR     PROSTATE SURGERY      radioactive seeding 6/16/08      REPAIR ANEURYSM ABDOMINAL AORTA  06/08     REPAIR VALVE MITRAL  10/16/2013    SJM 21([id number]):AVR, SJM 27 :MVRProcedure: REPAIR VALVE MITRAL;  REDO STERNOTOMY/REDO AORTIC VALVE REPLACEMENT/ MITRAL VALVE REPLACEMENT/REIMPLANTATION OF RIGHT CORONARY ARTERY BYPASS WITH RACHAEL ( ON PUMP);  Surgeon: Viet Singh MD;  Location:  OR     REPLACE VALVE AORTIC  10/16/2013    Procedure: REPLACE VALVE AORTIC;;  Surgeon: Viet Singh MD;  Location:  OR     SURGERY GENERAL IP CONSULT  05/2008 Excision aneurysm abdominal aorta     SURGERY GENERAL IP CONSULT  1997 Vocal cord polypectomy     VASCULAR SURGERY  1968, 1993     varicose vein stripping     Anesthesia Evaluation     .             ROS/MED HX    ENT/Pulmonary:     (+)sleep apnea, uses CPAP , . .    Neurologic:     (+)neuropathy - left leg,     Cardiovascular:     (+) hypertension-Peripheral Vascular Disease ( AAA repair)-- Other, CAD, -CABG-. Taking blood thinners Pt has received instructions: Instructions Given to patient: if INR less than 1.5, will need heparin bridge.  . CHF etiology: diastolic Last EF: 59% . . :. dysrhythmias ( s/p ablation) a-fib, valvular problems/murmurs type: AS and MR s/p avr x 2, MVR x 1:. Previous cardiac testing date:results:Stress Testdate:4/18/19 results:1. Myocardial perfusion imaging using single isotope technique  demonstrated fixed inferior inferior septal defect and a second fixed  apical defect. There is no significant reversibility on this study to  suggest ischemia.   2. Gated images demonstrated normal LV cavity size and systolic  function. The left ventricular systolic function is 59%.  3. Compared to the prior study from no prior study .ECG reviewed date: results:RBBB, left fascicular block date: results:          METS/Exercise Tolerance:     Hematologic:     (+) Anemia, -      Musculoskeletal:   (+) arthritis,  -       GI/Hepatic:     (+) GERD Asymptomatic on medication, Other GI/Hepatic  "diaphragmatic hernia      Renal/Genitourinary:     (+) BPH,       Endo:     (+) Obesity, .      Psychiatric:  - neg psychiatric ROS       Infectious Disease:  - neg infectious disease ROS       Malignancy:   (+) Malignancy History of Prostate  Prostate CA status post Radiation,         Other:                          Physical Exam  Normal systems: pulmonary and dental    Airway   Mallampati: II  TM distance: >3 FB  Neck ROM: full    Dental     Cardiovascular   Rhythm and rate: regular and normal  (+) murmur       Pulmonary             Lab Results   Component Value Date    WBC 9.6 08/28/2019    HGB 12.6 (L) 08/28/2019    HCT 39.8 (L) 08/28/2019     08/28/2019     07/24/2019    POTASSIUM 4.3 07/24/2019    CHLORIDE 101 07/24/2019    CO2 27 07/24/2019    BUN 16 07/24/2019    CR 1.10 07/24/2019     (H) 07/24/2019    AWILDA 8.2 (L) 07/24/2019    PHOS 3.8 10/18/2013    MAG 1.9 05/21/2018    ALBUMIN 4.1 05/30/2019    PROTTOTAL 8.0 05/30/2019    ALT 70 05/30/2019    AST 41 05/30/2019    ALKPHOS 57 05/30/2019    BILITOTAL 0.3 05/30/2019    PTT 37 10/16/2013    INR 2.64 (H) 08/28/2019    FIBR 229 10/16/2013    TSH 0.96 05/08/2018    T4 0.79 11/21/2005       Preop Vitals  BP Readings from Last 3 Encounters:   08/28/19 128/74   08/14/19 100/60   08/01/19 124/62    Pulse Readings from Last 3 Encounters:   08/28/19 100   08/14/19 95   08/01/19 68      Resp Readings from Last 3 Encounters:   08/28/19 22   07/26/19 18   04/22/19 16    SpO2 Readings from Last 3 Encounters:   08/28/19 97%   08/14/19 95%   08/01/19 97%      Temp Readings from Last 1 Encounters:   08/28/19 98.1  F (36.7  C) (Oral)    Ht Readings from Last 1 Encounters:   08/28/19 1.676 m (5' 6\")      Wt Readings from Last 1 Encounters:   08/28/19 93.1 kg (205 lb 3.2 oz)    Estimated body mass index is 33.12 kg/m  as calculated from the following:    Height as of 8/28/19: 1.676 m (5' 6\").    Weight as of 8/28/19: 93.1 kg (205 lb 3.2 oz).       Anesthesia " Plan      History & Physical Review  History and physical reviewed and following examination; no interval change.    ASA Status:  3 .    NPO Status:  > 8 hours    Plan for General and LMA with Intravenous induction. Maintenance will be Inhalation.    PONV prophylaxis:  Ondansetron (or other 5HT-3) and Dexamethasone or Solumedrol    Discussed elevated risk of bleeding, stroke, and cardiac complications related to his co-morbidities.  He understands the risks and agrees to proceed.       Postoperative Care  Postoperative pain management:  IV analgesics, Multi-modal analgesia and Oral pain medications.      Consents  Anesthetic plan, risks, benefits and alternatives discussed with:  Patient..                 Vega Rios MD                    .

## 2019-08-29 NOTE — ADDENDUM NOTE
Addendum  created 08/29/19 1450 by Magdiel Arechiga APRN CRNA    Intraprocedure Meds edited, Orders acknowledged in Narrator

## 2019-08-29 NOTE — DISCHARGE INSTRUCTIONS
DR. SONJA BARRAZA M.D.         CLINIC PHONE NUMBER:  [phone]   [facility]        GENERAL ANESTHESIA OR SEDATION ADULT DISCHARGE INSTRUCTIONS   SPECIAL PRECAUTIONS FOR 24 HOURS AFTER SURGERY    IT IS NOT UNUSUAL TO FEEL LIGHT-HEADED OR FAINT, UP TO 24 HOURS AFTER SURGERY OR WHILE TAKING PAIN MEDICATION.  IF YOU HAVE THESE SYMPTOMS; SIT FOR A FEW MINUTES BEFORE STANDING AND HAVE SOMEONE ASSIST YOU WHEN YOU GET UP TO WALK OR USE THE BATHROOM.    YOU SHOULD REST AND RELAX FOR THE NEXT 24 HOURS AND YOU MUST MAKE ARRANGEMENTS TO HAVE SOMEONE STAY WITH YOU FOR AT LEAST 24 HOURS AFTER YOUR DISCHARGE.  AVOID HAZARDOUS AND STRENUOUS ACTIVITIES.  DO NOT MAKE IMPORTANT DECISIONS FOR 24 HOURS.    DO NOT DRIVE ANY VEHICLE OR OPERATE MECHANICAL EQUIPMENT FOR 24 HOURS FOLLOWING THE END OF YOUR SURGERY.  EVEN THOUGH YOU MAY FEEL NORMAL, YOUR REACTIONS MAY BE AFFECTED BY THE MEDICATION YOU HAVE RECEIVED.    DO NOT DRINK ALCOHOLIC BEVERAGES FOR 24 HOURS FOLLOWING YOUR SURGERY.    DRINK CLEAR LIQUIDS (APPLE JUICE, GINGER ALE, 7-UP, BROTH, ETC.).  PROGRESS TO YOUR REGULAR DIET AS YOU FEEL ABLE.    YOU MAY HAVE A DRY MOUTH, A SORE THROAT, MUSCLES ACHES OR TROUBLE SLEEPING.  THESE SHOULD GO AWAY AFTER 24 HOURS.    CALL YOUR DOCTOR FOR ANY OF THE FOLLOWING:  SIGNS OF INFECTION (FEVER, GROWING TENDERNESS AT THE SURGERY SITE, A LARGE AMOUNT OF DRAINAGE OR BLEEDING, SEVERE PAIN, FOUL-SMELLING DRAINAGE, REDNESS OR SWELLING.    IT HAS BEEN OVER 8 TO 10 HOURS SINCE SURGERY AND YOU ARE STILL NOT ABLE TO URINATE (PASS WATER).       Instructions per Dr. Barraza:    1. Weight bearing as tolerated but keep activity to a minimum for the next few days.  2. Leave dressing on until you see Dr. Barraza.  3. Sponge baths to keep dressing dry.  4. Ok to resume Coumadin as instructed by primary doctor.  5. Call to make a follow up appointment with Dr. Barraza for next week. Please call Shabnam at his office to  [phone]      Maximum acetaminophen (Tylenol) dose from all sources should not exceed 4 grams (4000 mg) per day. Tylenol 975 mg given at 11:50 a.m.     You received Toradol, an IV form of ibuprofen (Motrin) at 1:30 pm.  Do not take any ibuprofen products until 7:30 pm.

## 2019-08-29 NOTE — OR NURSING
Educated on s/s of DVT & PE and when to call MD and when to call 911.  Also wrote these things on discharge instructions.

## 2019-08-29 NOTE — OP NOTE
Procedure Date: 08/29/2019      PREOPERATIVE DIAGNOSIS:  Wound breakdown, right knee, status post total knee arthroplasty.      POSTOPERATIVE DIAGNOSIS:  Wound breakdown, right knee, status post total knee arthroplasty.      PROCEDURES:   1.  Irrigation and debridement of right total knee arthroplasty wound inferior 3 cm.   2.  Secondary wound closure.      ANESTHESIA:  General.      FIRST ASSISTANT:  JEANINE Engel      INDICATIONS:  This is a 79-year-old man who underwent total knee arthroplasty about a month ago.  He has been doing very well until it appeared he was spitting a stitch.  He was treated with local wound care, but has continued to have drainage.  There is no worry that it is into the joint itself.  It is contained to the soft tissue at the inferior approximately 3 cm of wound.  Appropriate risks and alternatives have been discussed however and has been elected to take him back to surgery to irrigate and debride this and perform a secondary wound closure as I do not want it to get infected.  The patient understands the risks and agrees to proceed.      DESCRIPTION OF PROCEDURE:  The patient was brought to the operating room, given a general anesthetic, right knee prepped and draped sterilely in the usual manner.  Examining the wound, there is no effusion of the knee, pressing on the knee did not express any fluid.  We swabbed and cultured the wound.  Under tourniquet control, I ellipsed the area, removing just a minimal 1-2 mm of skin on either side of the wound, so we could get some fresh edges.  We debrided the wound and sent tissue for culture.  I probed the wound with my finger.  It did extend perhaps 3-4 cm over the pes bursa.  I was able to palpate the suture line in the medial parapatellar arthrotomy.  This was intact.  It did not extend into the knee.  Wound was irrigated with 3 liters of antibiotic solution.  We then closed using 2-0 Vicryl in the subcutaneous tissue and 3-0 nylon on  the skin.  Sterile compressive dressing was applied.  Tourniquet was released and patient awakened and taken to the recovery room in good condition.  There were no intraoperative complications and minimal blood loss.         SONJA BARRAZA MD             D: 2019   T: 2019   MT: ALEXANDRU      Name:     LIANG VAUGHN   MRN:      [id number]        Account:        [id number]   :      1940           Procedure Date: 2019      Document: [id number]       [facility]

## 2019-08-29 NOTE — ANESTHESIA CARE TRANSFER NOTE
Patient: Fausto Alli Yordan    Procedure(s):  INCISION AND DRAINAGE, KNEE W/ Secondary Wound Closure    Diagnosis: .  Diagnosis Additional Information: No value filed.    Anesthesia Type:   General, LMA     Note:  Airway :Face Mask  Patient transferred to:PACU  Comments: Pt to PACu, VSS, report to RNHandoff Report: Identifed the Patient, Identified the Reponsible Provider, Reviewed the pertinent medical history, Discussed the surgical course, Reviewed Intra-OP anesthesia mangement and issues during anesthesia, Set expectations for post-procedure period and Allowed opportunity for questions and acknowledgement of understanding      Vitals: (Last set prior to Anesthesia Care Transfer)    CRNA VITALS  8/29/2019 1255 - 8/29/2019 1334      8/29/2019             Resp Rate (observed):  6  (Abnormal)                 Electronically Signed By: ADELE Miller CRNA  August 29, 2019  1:34 PM

## 2019-08-29 NOTE — PROVIDER NOTIFICATION
Spoke with Ashish Jensen's PA.  Per Ashish, pt will be WBAT at discharge, leave dressing on til seen next week-he will need to make this appointment. Ok to resume Coumadin per primary which appears to be today per Dr. Velez's note.    Spoke with Ashish again, Dr. Jensen did not sign take home Rx-Oxycodone.  Per Ashish she will have a new, signed RX @ [facility] office in [place] waiting for them.

## 2019-08-29 NOTE — ANESTHESIA POSTPROCEDURE EVALUATION
Patient: Fausto Alli Yordan    Procedure(s):  INCISION AND DRAINAGE, KNEE W/ Secondary Wound Closure    Diagnosis:.  Diagnosis Additional Information: No value filed.    Anesthesia Type:  General, LMA    Note:  Anesthesia Post Evaluation    Patient location during evaluation: PACU  Patient participation: Able to fully participate in evaluation  Level of consciousness: awake and alert  Pain management: adequate  Airway patency: patent  Cardiovascular status: acceptable  Respiratory status: acceptable  Hydration status: acceptable  PONV: controlled             Last vitals:  Vitals:    08/29/19 1123   BP: 134/88   Resp: 20   Temp: 97.2  F (36.2  C)   SpO2: 93%         Electronically Signed By: Vega Rios MD  August 29, 2019  1:33 PM

## 2019-08-29 NOTE — BRIEF OP NOTE
[facility]    Brief Operative Note    Pre-operative diagnosis: .  Post-operative diagnosis * No post-op diagnosis entered *  Procedure: Procedure(s):  INCISION AND DRAINAGE, KNEE W/ Secondary Wound Closure  Surgeon: Surgeon(s) and Role:     * Prasanth Jensen MD - Primary  Anesthesia: General   Estimated blood loss: Minimal  Drains: None  Specimens:   ID Type Source Tests Collected by Time Destination   1 : right knee fluid post total knee Fluid Knee, Right ANAEROBIC BACTERIAL CULTURE, FLUID CULTURE AEROBIC BACTERIAL, GRAM STAIN Prasanth Jensen MD 8/29/2019  1:10 PM    2 : right knee tissue post total knee Tissue Knee, Right ANAEROBIC BACTERIAL CULTURE, GRAM STAIN, TISSUE CULTURE AEROBIC BACTERIAL Prasanth Jensen MD 8/29/2019  1:11 PM      Findings:   None.  Complications: None   .  Implants:  * No implants in log *

## 2019-08-31 ENCOUNTER — TRANSFERRED RECORDS (OUTPATIENT)
Dept: HEALTH INFORMATION MANAGEMENT | Facility: CLINIC | Age: 79
End: 2019-08-31

## 2019-08-31 LAB
BACTERIA SPEC CULT: ABNORMAL
Lab: ABNORMAL
SPECIMEN SOURCE: ABNORMAL

## 2019-09-01 LAB
BACTERIA SPEC CULT: ABNORMAL
BACTERIA SPEC CULT: ABNORMAL
Lab: ABNORMAL
SPECIMEN SOURCE: ABNORMAL

## 2019-09-03 ENCOUNTER — TRANSFERRED RECORDS (OUTPATIENT)
Dept: HEALTH INFORMATION MANAGEMENT | Facility: CLINIC | Age: 79
End: 2019-09-03

## 2019-09-03 ENCOUNTER — ANTICOAGULATION THERAPY VISIT (OUTPATIENT)
Dept: NURSING | Facility: CLINIC | Age: 79
End: 2019-09-03
Payer: MEDICARE

## 2019-09-03 DIAGNOSIS — Z79.01 LONG TERM CURRENT USE OF ANTICOAGULANT THERAPY: Primary | ICD-10-CM

## 2019-09-03 DIAGNOSIS — Z95.2 S/P MITRAL VALVE REPLACEMENT: ICD-10-CM

## 2019-09-03 DIAGNOSIS — I48.91 AF (ATRIAL FIBRILLATION) (H): ICD-10-CM

## 2019-09-03 LAB — INR POINT OF CARE: 3.1 (ref 0.86–1.14)

## 2019-09-03 PROCEDURE — 85610 PROTHROMBIN TIME: CPT | Mod: QW

## 2019-09-03 PROCEDURE — 36416 COLLJ CAPILLARY BLOOD SPEC: CPT

## 2019-09-03 NOTE — PROGRESS NOTES
"ANTICOAGULATION FOLLOW-UP CLINIC VISIT    Patient Name:  Fausto Farr  Date:  9/3/2019  Contact Type:  Face to Face    SUBJECTIVE:  Patient Findings     Comments:   The patient was assessed for   diet, medication,   missed or extra doses,   bruising or bleeding,   with no problem findings.    Had an INCISION AND DRAINAGE, KNEE W/ Secondary Wound Closure (Right Knee) 8/29/19    Knee is still oozing \"watery pink fluid\" at mid incision.  Has appointment with surgeon today.        Clinical Outcomes     Comments:   The patient was assessed for   diet, medication,   missed or extra doses,   bruising or bleeding,   with no problem findings.    Had an INCISION AND DRAINAGE, KNEE W/ Secondary Wound Closure (Right Knee) 8/29/19    Knee is still oozing \"watery pink fluid\" at mid incision.  Has appointment with surgeon today.           OBJECTIVE    INR Protime   Date Value Ref Range Status   09/03/2019 3.1 (A) 0.86 - 1.14 Final       ASSESSMENT / PLAN  INR assessment THER    Recheck INR In: 2 WEEKS    INR Location Clinic      Anticoagulation Summary  As of 9/3/2019    INR goal:   2.5-3.5   TTR:   56.8 % (3.3 y)   INR used for dosing:   3.1 (9/3/2019)   Warfarin maintenance plan:   5 mg (5 mg x 1) every Mon, Wed, Fri; 7.5 mg (5 mg x 1.5) all other days   Full warfarin instructions:   5 mg every Mon, Wed, Fri; 7.5 mg all other days   Weekly warfarin total:   45 mg   No change documented:   Caryn Campos RN   Plan last modified:   Caryn Campos RN (6/25/2019)   Next INR check:   9/17/2019   Priority:   INR   Target end date:   Indefinite    Indications    AF (atrial fibrillation) (H) [I48.91]  S/P mitral valve replacement [Z95.2]  Long term current use of anticoagulant therapy [Z79.01]             Anticoagulation Episode Summary     INR check location:       Preferred lab:       Send INR reminders to:   SHANNA DOWELL    Comments:   5mg tabs - andrade dose // transfer from [facility] // [facility] [place] // CALENDAR // " right knee replaced 7/22/19      Anticoagulation Care Providers     Provider Role Specialty Phone number    Tu Reyes MD  Internal Medicine [phone]            See the Encounter Report to view Anticoagulation Flowsheet and Dosing Calendar (Go to Encounters tab in chart review, and find the Anticoagulation Therapy Visit)    INR is therapeutic today.   Patient will continue same maintenance dose.   Follow up in 2 weeks.        Caryn Campos RN

## 2019-09-04 ENCOUNTER — HOSPITAL ENCOUNTER (OUTPATIENT)
Dept: CT IMAGING | Facility: CLINIC | Age: 79
Discharge: HOME | End: 2019-09-04
Attending: RADIOLOGY | Admitting: RADIOLOGY
Payer: MEDICARE

## 2019-09-04 DIAGNOSIS — I71.40 ABDOMINAL AORTIC ANEURYSM (H): ICD-10-CM

## 2019-09-04 PROCEDURE — 74176 CT ABD & PELVIS W/O CONTRAST: CPT

## 2019-09-05 ENCOUNTER — TELEPHONE (OUTPATIENT)
Dept: OTHER | Facility: CLINIC | Age: 79
End: 2019-09-05

## 2019-09-05 DIAGNOSIS — I71.40 ABDOMINAL AORTIC ANEURYSM (AAA) WITHOUT RUPTURE (H): Primary | ICD-10-CM

## 2019-09-05 NOTE — TELEPHONE ENCOUNTER
Ralph returned phone.  Discussed CT scan results.  Aneurysm sac is unchanged.  Recommend annual follow up.  CT without contrast.  Yesy Rao RN  IR nurse clinician  [phone]  CT ABDOMEN PELVIS W/O CONTRAST 9/4/2019 9:55 AM     HISTORY: h/o EVAR 2008.  Comparison study done 7/16/2018.  Annual  follow up; Abdominal aortic aneurysm (H)    TECHNIQUE: CT of the abdomen and pelvis was performed without  intravenous contrast. Radiation dose for this scan was reduced using  automated exposure control, adjustment of the mA and/or kV according  to patient size, or iterative reconstruction technique.    COMPARISON: CT of the abdomen/pelvis dated 6/22/2017.    FINDINGS: Again identified is an abdominal aortic endograft treated  with an endovascular stent graft. Maximum anterior-posterior and  transverse dimensions of the aneurysm sac are approximately 6.5 x 6.5  cm versus 6.4 x 6.4 cm. These measurements are not significantly  changed when compared to the previous CT dated 7/16/2018.      Impression     IMPRESSION: No significant change in size of an infrarenal abdominal  aortic aneurysm. Suggest annual follow-up with a CT without contrast.    ANDERSON WOODS MD

## 2019-09-06 LAB
BACTERIA SPEC CULT: ABNORMAL
BACTERIA SPEC CULT: ABNORMAL
Lab: ABNORMAL
SPECIMEN SOURCE: ABNORMAL

## 2019-09-10 ENCOUNTER — TRANSFERRED RECORDS (OUTPATIENT)
Dept: HEALTH INFORMATION MANAGEMENT | Facility: CLINIC | Age: 79
End: 2019-09-10

## 2019-09-12 LAB
BACTERIA SPEC CULT: ABNORMAL
BACTERIA SPEC CULT: ABNORMAL
Lab: ABNORMAL
SPECIMEN SOURCE: ABNORMAL

## 2019-09-16 ENCOUNTER — ANTICOAGULATION THERAPY VISIT (OUTPATIENT)
Dept: NURSING | Facility: CLINIC | Age: 79
End: 2019-09-16
Payer: MEDICARE

## 2019-09-16 DIAGNOSIS — Z95.2 S/P MITRAL VALVE REPLACEMENT: ICD-10-CM

## 2019-09-16 DIAGNOSIS — I48.91 AF (ATRIAL FIBRILLATION) (H): ICD-10-CM

## 2019-09-16 DIAGNOSIS — Z79.01 LONG TERM CURRENT USE OF ANTICOAGULANT THERAPY: ICD-10-CM

## 2019-09-16 LAB — INR POINT OF CARE: 2.9 (ref 0.86–1.14)

## 2019-09-16 PROCEDURE — 36416 COLLJ CAPILLARY BLOOD SPEC: CPT

## 2019-09-16 PROCEDURE — 85610 PROTHROMBIN TIME: CPT | Mod: QW

## 2019-09-16 NOTE — PROGRESS NOTES
ANTICOAGULATION FOLLOW-UP CLINIC VISIT    Patient Name:  Fausto Farr  Date:  2019  Contact Type:  Face to Face    SUBJECTIVE:  Patient Findings     Comments:   Having some knee drainage changing the dressing BID seeing Ortho surgeon weekly, on antibiotics preventatively past 4 weeks.Drainage is serosanguineous light pink to clear. States pain is unchanged at about a 4-5/10. No other changes or issues, no extra bruising or other bleeding.        Clinical Outcomes     Negatives:   Major bleeding event, Thromboembolic event, Anticoagulation-related hospital admission, Anticoagulation-related ED visit, Anticoagulation-related fatality    Comments:   Having some knee drainage changing the dressing BID seeing Ortho surgeon weekly, on antibiotics preventatively past 4 weeks.Drainage is serosanguineous light pink to clear. States pain is unchanged at about a 4-5/10. No other changes or issues, no extra bruising or other bleeding.           OBJECTIVE    INR Protime   Date Value Ref Range Status   2019 2.9 (A) 0.86 - 1.14 Final       ASSESSMENT / PLAN  INR assessment THER    Recheck INR In: 3 WEEKS    INR Location Clinic      Anticoagulation Summary  As of 2019    INR goal:   2.5-3.5   TTR:   57.3 % (3.3 y)   INR used for dosin.9 (2019)   Warfarin maintenance plan:   5 mg (5 mg x 1) every Mon, Wed, Fri; 7.5 mg (5 mg x 1.5) all other days   Full warfarin instructions:   5 mg every Mon, Wed, Fri; 7.5 mg all other days   Weekly warfarin total:   45 mg   No change documented:   Annabel Dhillon RN   Plan last modified:   Caryn Campos RN (2019)   Next INR check:   10/8/2019   Priority:   INR   Target end date:   Indefinite    Indications    AF (atrial fibrillation) (H) [I48.91]  S/P mitral valve replacement [Z95.2]  Long term current use of anticoagulant therapy [Z79.01]             Anticoagulation Episode Summary     INR check location:       Preferred lab:       Send INR reminders to:    SHANNA DOWELL    Comments:   5mg tabs - andrade dose // transfer from [facility] // [facility] // CALENDAR // right knee replaced 7/22/19      Anticoagulation Care Providers     Provider Role Specialty Phone number    Tu Reyes MD  Internal Medicine [phone]            See the Encounter Report to view Anticoagulation Flowsheet and Dosing Calendar (Go to Encounters tab in chart review, and find the Anticoagulation Therapy Visit)    Dosage adjustment made based on physician directed care plan.    Annabel Dhillon RN

## 2019-09-17 ENCOUNTER — TRANSFERRED RECORDS (OUTPATIENT)
Dept: HEALTH INFORMATION MANAGEMENT | Facility: CLINIC | Age: 79
End: 2019-09-17

## 2019-09-26 ENCOUNTER — TRANSFERRED RECORDS (OUTPATIENT)
Dept: HEALTH INFORMATION MANAGEMENT | Facility: CLINIC | Age: 79
End: 2019-09-26

## 2019-09-30 NOTE — PROGRESS NOTES
ANTICOAGULATION FOLLOW-UP CLINIC VISIT    Patient Name:  Fausto Farr  Date:  2/12/2018  Contact Type:  Face to Face    SUBJECTIVE:     Patient Findings     Positives No Problem Findings    Comments Going to [place] 2/15 to 3/1/18           OBJECTIVE    INR Protime   Date Value Ref Range Status   02/12/2018 2.6 (A) 0.86 - 1.14 Final       ASSESSMENT / PLAN  INR assessment THER    Recheck INR In: 5 WEEKS    INR Location Clinic      Anticoagulation Summary as of 2/12/2018     INR goal 2.5-3.5   Today's INR 2.6   Maintenance plan 5 mg (5 mg x 1) on Mon; 7.5 mg (5 mg x 1.5) all other days   Full instructions 5 mg on Mon; 7.5 mg all other days   Weekly total 50 mg   No change documented Caryn Campos RN   Plan last modified Caryn Campos RN (11/9/2017)   Next INR check 3/19/2018   Priority INR   Target end date Indefinite    Indications   AF (atrial fibrillation) (H) [I48.91]  S/P mitral valve replacement [Z95.2]  Long-term (current) use of anticoagulants [Z79.01] [Z79.01]         Anticoagulation Episode Summary     INR check location     Preferred lab     Send INR reminders to [facility]    Comments 5mg tabs - andrade dose // transfer from [facility] // [facility] // CALENDAR      Anticoagulation Care Providers     Provider Role Specialty Phone number    Tu Reyes MD  Internal Medicine [phone]            See the Encounter Report to view Anticoagulation Flowsheet and Dosing Calendar (Go to Encounters tab in chart review, and find the Anticoagulation Therapy Visit)        Caryn Campos RN               
Abdomen soft, nontender, nondistended, bowel sounds present in all 4 quadrants.

## 2019-10-01 ENCOUNTER — HEALTH MAINTENANCE LETTER (OUTPATIENT)
Age: 79
End: 2019-10-01

## 2019-10-03 ENCOUNTER — TRANSFERRED RECORDS (OUTPATIENT)
Dept: HEALTH INFORMATION MANAGEMENT | Facility: CLINIC | Age: 79
End: 2019-10-03

## 2019-10-08 ENCOUNTER — ANTICOAGULATION THERAPY VISIT (OUTPATIENT)
Dept: NURSING | Facility: CLINIC | Age: 79
End: 2019-10-08
Payer: MEDICARE

## 2019-10-08 DIAGNOSIS — I48.91 AF (ATRIAL FIBRILLATION) (H): ICD-10-CM

## 2019-10-08 DIAGNOSIS — Z95.2 S/P AORTIC VALVE REPLACEMENT: ICD-10-CM

## 2019-10-08 DIAGNOSIS — Z95.2 S/P MITRAL VALVE REPLACEMENT: ICD-10-CM

## 2019-10-08 DIAGNOSIS — Z79.01 LONG TERM CURRENT USE OF ANTICOAGULANT THERAPY: Primary | ICD-10-CM

## 2019-10-08 LAB — INR POINT OF CARE: 3 (ref 0.86–1.14)

## 2019-10-08 PROCEDURE — 36416 COLLJ CAPILLARY BLOOD SPEC: CPT

## 2019-10-08 PROCEDURE — 85610 PROTHROMBIN TIME: CPT | Mod: QW

## 2019-10-08 RX ORDER — WARFARIN SODIUM 5 MG/1
TABLET ORAL
Qty: 120 TABLET | Refills: 3
Start: 2019-10-08 | End: 2019-12-07

## 2019-10-08 NOTE — PROGRESS NOTES
ANTICOAGULATION FOLLOW-UP CLINIC VISIT    Patient Name:  Fausto Farr  Date:  10/8/2019  Contact Type:  Face to Face    SUBJECTIVE:  Patient Findings     Comments:   The patient was assessed for   diet, medication,   missed or extra doses,   bruising or bleeding,   with no problem findings.          Clinical Outcomes     Comments:   The patient was assessed for   diet, medication,   missed or extra doses,   bruising or bleeding,   with no problem findings.             OBJECTIVE    INR Protime   Date Value Ref Range Status   10/08/2019 3.0 (A) 0.86 - 1.14 Final       ASSESSMENT / PLAN  INR assessment THER    Recheck INR In: 4 WEEKS    INR Location Clinic      Anticoagulation Summary  As of 10/8/2019    INR goal:   2.5-3.5   TTR:   58.0 % (3.4 y)   INR used for dosing:   3.0 (10/8/2019)   Warfarin maintenance plan:   5 mg (5 mg x 1) every Mon, Wed, Fri; 7.5 mg (5 mg x 1.5) all other days   Full warfarin instructions:   5 mg every Mon, Wed, Fri; 7.5 mg all other days   Weekly warfarin total:   45 mg   No change documented:   Caryn Campos RN   Plan last modified:   Caryn Campos RN (6/25/2019)   Next INR check:   11/5/2019   Priority:   INR   Target end date:   Indefinite    Indications    AF (atrial fibrillation) (H) [I48.91]  S/P mitral valve replacement [Z95.2]  Long term current use of anticoagulant therapy [Z79.01]             Anticoagulation Episode Summary     INR check location:       Preferred lab:       Send INR reminders to:   SHANNA DOWELL    Comments:   5mg tabs - andrade dose // transfer from [facility] // [employer] // CALENDAR // right knee replaced 7/22/19      Anticoagulation Care Providers     Provider Role Specialty Phone number    Tu Reyes MD  Internal Medicine [phone]            See the Encounter Report to view Anticoagulation Flowsheet and Dosing Calendar (Go to Encounters tab in chart review, and find the Anticoagulation Therapy Visit)    INR is therapeutic  today.   Patient will continue same maintenance dose.   Follow up in 4 weeks.        Caryn Campos RN

## 2019-10-14 NOTE — PROGRESS NOTES
Pre-Visit Planning     Future Appointments   Date Time Provider Department Center   10/15/2019  1:35 PM Jodi Flowre Mai, MD [facility]   10/28/2019 10:30 AM Isrrael Dobbins MD [facility] SLEEP   11/5/2019  1:30 PM [facility]       Appointment Notes for this encounter:   est care transfer from     Questionnaires Reviewed/Assigned  No additional questionnaires are needed    Patient preferred phone number: [phone]    Patient contact not needed.

## 2019-10-15 ENCOUNTER — OFFICE VISIT (OUTPATIENT)
Dept: PEDIATRICS | Facility: CLINIC | Age: 79
End: 2019-10-15
Payer: MEDICARE

## 2019-10-15 VITALS
BODY MASS INDEX: 32.31 KG/M2 | OXYGEN SATURATION: 97 % | HEART RATE: 85 BPM | WEIGHT: 200.1 LBS | DIASTOLIC BLOOD PRESSURE: 70 MMHG | RESPIRATION RATE: 16 BRPM | SYSTOLIC BLOOD PRESSURE: 122 MMHG | TEMPERATURE: 98.1 F

## 2019-10-15 DIAGNOSIS — T81.89XD DELAYED SURGICAL WOUND HEALING, SUBSEQUENT ENCOUNTER: ICD-10-CM

## 2019-10-15 DIAGNOSIS — Z00.00 ENCOUNTER FOR MEDICAL EXAMINATION TO ESTABLISH CARE: ICD-10-CM

## 2019-10-15 DIAGNOSIS — L21.9 SEBORRHEIC DERMATITIS: Primary | ICD-10-CM

## 2019-10-15 PROCEDURE — 99213 OFFICE O/P EST LOW 20 MIN: CPT | Performed by: INTERNAL MEDICINE

## 2019-10-15 RX ORDER — CEFADROXIL 500 MG/1
CAPSULE ORAL
Refills: 3 | Status: ON HOLD | COMMUNITY
Start: 2019-09-30 | End: 2019-11-18

## 2019-10-15 RX ORDER — BETAMETHASONE VALERATE 0.1 %
LOTION (ML) TOPICAL 2 TIMES DAILY
COMMUNITY
End: 2019-10-15

## 2019-10-15 RX ORDER — BETAMETHASONE VALERATE 0.1 %
LOTION (ML) TOPICAL 2 TIMES DAILY
Qty: 60 ML | Refills: 3 | Status: SHIPPED | OUTPATIENT
Start: 2019-10-15 | End: 2021-04-09

## 2019-10-15 NOTE — PROGRESS NOTES
New Patient/Transfer of Care    Previous PCP or place of care: previous Dr. Reyes  Up to date on yearly wellness exam? May 2019    Establish Care/Chart Review:    PMH:    CAD s/p CABG     On ASA, beta blocker, furosemide, crestor, ezetemibe    S/p aortic and mitral valve replacement (mechanical)    On warfarin    Atrial fibrillation and flutter    HTN    Systolic congestive heart failure (ICM)    Triple A - followed by vascular surgery - yearly surveillance    S/p total knee replacement June 2019 with delayed wound healing    S/p lumbar fusion surgery    Ulcerative colitis on mesalamine (prescribed by PCP)    Prostate Cancer    Social hx:  Lives at home with wife.      Subjective     Fausto Farr is a 79 year old male who presents to clinic today for the following health issues:    Wound Check        Medication Followup of betamethasone lotion - for eczema on scalp, patient requesting refill       Wound check - right knee s/p TKA in June wound did not heal properly had f/u sutures and debridement still has drainage. Patient plans to follow up with ortho.                Patient Active Problem List   Diagnosis     Rotator cuff (capsule) sprain     Somatic dysfunction of pelvic region     Contact dermatitis and other eczema, due to unspecified cause     Ulcerative colitis (H)     Atrial fibrillation (H)     Other specified anemias     Gastric ulcer     Bilateral low back pain with left-sided sciatica     Nonallopathic lesion of lumbar region     Nonallopathic lesion of sacral region     Diaphragmatic hernia     Abdominal pain, epigastric     Malaise and fatigue     Nocturia     Nonallopathic lesion of cervical region     Nonallopathic lesion of thoracic region     Malignant neoplasm of prostate (H)     Abdominal aortic aneurysm (H)     Nonallopathic lesion of rib cage     Vitamin D deficiency disease     Anemia relative at 12.5 in 8-13      Essential hypertension, benign     Hyperlipidemia LDL goal <100      Prostate cancer (H)     Glucose intolerance (impaired glucose tolerance)     Sciatica of left side since 2000     Valvular heart disease     Heart murmur     MRSA (methicillin resistant Staphylococcus aureus)     Health Care Home     Urinary retention     Coronary artery disease     ACP (advance care planning)     AF (atrial fibrillation) (H)     S/P aortic valve replacement     S/P CABG (coronary artery bypass graft)     Stented coronary artery     Mixed hyperlipidemia     PVD (peripheral vascular disease) (H)     Abnormal ECG     Personal history of tobacco use, presenting hazards to health     Spinal stenosis of lumbar region without neurogenic claudication     S/P mitral valve replacement     RBBB with left anterior fascicular block     S/P lumbar spinal fusion     Long term current use of anticoagulant therapy     Chronic systolic congestive heart failure (H)     GAGE (obstructive sleep apnea)     Sepsis due to group B Streptococcus (H)     Typical atrial flutter (H)     Obesity (BMI 35.0-39.9) with comorbidity (H)     Knee pain, chronic     Total knee replacement status     Past Surgical History:   Procedure Laterality Date     AORTIC VALVE REPLACEMENT  1/3/06    redo AVR SJM 21mm and SJM MVR 27mm in 2013SJM 21([id number]):AVR, SJM 27 :MVR-     ARTHROPLASTY KNEE      right knee     ARTHROPLASTY KNEE Right 7/22/2019    Procedure: Right total knee arthroplasty;  Surgeon: Prasanth Jensen MD;  Location: [facility] OR     BACK SURGERY  Oct 2015    Fusion L4-5, laminectomy L2, L3     BYPASS GRAFT ARTERY CORONARY  10/2013    reimplantation of radial artery graft to RCA     C CABG, VEIN, TWO  1/3/06    Left radial to RCA, LIMA to LAD (RA to RCA reimplanted at time of 2013 surg)     CARDIAC CATHERIZATION  11/2005    Stent placed to RCA     CARDIAC CATHERIZATION  04/2013    Occluded RCA, patent LIMA to LAD and radial graft to PDA     CARPAL TUNNEL RELEASE RT/LT  1994     COLONOSCOPY  8-22-11     CYSTOSCOPY FLEXIBLE   10/16/2013    Procedure: CYSTOSCOPY FLEXIBLE;  FLEXIBLE CYSTOSCOPY / DILATION OF URETHRA / INSERTION OF LESLIE;  Surgeon: Cooper Wallace MD;  Location:  OR     ENDOVASCULAR REPAIR, INFRARENAL ABDOMINAL AORTIC ANEURYSM/DISSECTION; MODULAR BIFURCATED PROSTHESIS  2006    AAA repair endovascular     ENT SURGERY       EP ABLATION ATRIAL FLUTTER N/A 4/22/2019    Procedure: EP Ablation Atrial Flutter;  Surgeon: Jessy Vasquez MD;  Location:  [facility]     GENITOURINARY SURGERY  6/16/08    radioactive seeding     HEAD & NECK SURGERY  1997    vocal cord polypectomy     INCISION AND DRAINAGE KNEE, COMBINED Right 8/29/2019    Procedure: INCISION AND DRAINAGE, KNEE W/ Secondary Wound Closure;  Surgeon: Prasanth Jensen MD;  Location: [facility] OR     KNEE SURGERY  2001 Right knee arthroscopy     OPTICAL TRACKING SYSTEM FUSION SPINE POSTERIOR LUMBAR THREE+ LEVELS N/A 10/29/2015    Procedure: OPTICAL TRACKING SYSTEM FUSION SPINE POSTERIOR LUMBAR THREE+ LEVELS;  Surgeon: Walt Garcia MD;  Location:  OR     PROSTATE SURGERY      radioactive seeding 6/16/08     REPAIR ANEURYSM ABDOMINAL AORTA  06/08     REPAIR VALVE MITRAL  10/16/2013    SJM 21([id number]):AVR, SJM 27 :MVRProcedure: REPAIR VALVE MITRAL;  REDO STERNOTOMY/REDO AORTIC VALVE REPLACEMENT/ MITRAL VALVE REPLACEMENT/REIMPLANTATION OF RIGHT CORONARY ARTERY BYPASS WITH RACHAEL ( ON PUMP);  Surgeon: Viet Singh MD;  Location:  OR     REPLACE VALVE AORTIC  10/16/2013    Procedure: REPLACE VALVE AORTIC;;  Surgeon: Viet Singh MD;  Location:  OR     SURGERY GENERAL IP CONSULT  05/2008 Excision aneurysm abdominal aorta     SURGERY GENERAL IP CONSULT  1997 Vocal cord polypectomy     VASCULAR SURGERY  1968, 1993     varicose vein stripping       Social History     Tobacco Use     Smoking status: Former Smoker     Packs/day: 1.00     Years: 40.00     Pack years: 40.00     Start date: 4/15/1962     Last attempt to quit:  10/23/2002     Years since quittin.9     Smokeless tobacco: Never Used   Substance Use Topics     Alcohol use: Yes     Comment: a couple beers per week (socially)     Family History   Problem Relation Age of Onset     No Known Problems Mother      Coronary Artery Disease Father         CABG     Heart Disease Father         Pacemaker     No Known Problems Brother      No Known Problems Sister      No Known Problems Son      Other Cancer Daughter      No Known Problems Daughter      Heart Disease Brother      Other - See Comments Grandchild          Current Outpatient Medications   Medication Sig Dispense Refill     betamethasone valerate (VALISONE) 0.1 % external lotion Apply topically 2 times daily Apply topically to scalp twice daily PRN 60 mL 3     ezetimibe (ZETIA) 10 MG tablet Take 1 tablet (10 mg) by mouth daily 90 tablet 3     furosemide (LASIX) 40 MG tablet Take 0.5 tablets (20 mg) by mouth daily 45 tablet 3     mesalamine (ASACOL HD) 800 MG EC tablet Take 1 tablet (800 mg) by mouth 2 times daily 180 tablet 11     metoprolol tartrate (LOPRESSOR) 25 MG tablet Take 1 tablet (25 mg) by mouth 2 times daily 180 tablet 3     rosuvastatin (CRESTOR) 40 MG tablet Take 1 tablet (40 mg) by mouth daily 90 tablet 3     tamsulosin (FLOMAX) 0.4 MG capsule TAKE 1 CAPSULE BY MOUTH EVERY DAY 90 capsule 3     warfarin ANTICOAGULANT (COUMADIN) 5 MG tablet Take 5 mg (1 tablet) every Mon, Wed, Fri; 7.5 mg (1.5 tablets) all other days or as directed by INR Clinic 120 tablet 3     acetaminophen (TYLENOL) 325 MG tablet Take 1-2 tablets (325-650 mg) by mouth every 6 hours as needed for mild pain 250 tablet 11     cefadroxil (DURICEF) 500 MG capsule TK 1 C PO BID  3     fluocinonide (LIDEX) 0.05 % external ointment Apply topically 2 times daily as needed       Allergies   Allergen Reactions     Bees Anaphylaxis     Recent Labs   Lab Test 19  1201 19  0648  19  1101  18  0747  18  1043  17  1244   10/19/13  0430  10/17/13  0520  12/26/12  1004   A1C  --   --   --   --   --   --   --   --   --  5.9  --  6.8*  --  5.7   < >  --    LDL  --   --   --  92  --  59  --  52  --  57   < >  --   --   --   --   --    HDL  --   --   --  43  --  36*  --  32*  --  36*   < >  --   --   --   --   --    TRIG  --   --   --  110  --  165*  --  176*  --  206*   < >  --   --   --   --   --    ALT  --   --   --  70  --  28  --  32  --  27   < >  --   --   --    < > 42   CR 0.98 1.10   < > 0.97   < > 1.01   < > 0.91  --  1.00   < > 1.22   < > 1.92*   < > 1.10   GFRESTIMATED 74 64   < > 74   < > 72   < > 81   < > 73   < > 58*   < > 35*   < > 66   GFRESTBLACK 85 74   < > 86   < > 87   < > >90   < > 88   < > 71   < > 42*   < > 80   POTASSIUM 3.9 4.3   < > 4.6   < > 4.0   < > 4.0  --  4.5   < > 4.8   < > 4.6   < > 4.5   TSH  --   --   --   --   --   --   --  0.96  --   --   --   --   --   --   --  1.32    < > = values in this interval not displayed.      BP Readings from Last 3 Encounters:   10/15/19 122/70   08/29/19 (!) 162/96   08/28/19 128/74    Wt Readings from Last 3 Encounters:   10/15/19 90.8 kg (200 lb 1.6 oz)   08/29/19 92.1 kg (203 lb)   08/28/19 93.1 kg (205 lb 3.2 oz)                    Reviewed and updated as needed this visit by Provider         Review of Systems   All other systems on a 10-point review are negative, unless otherwise noted in HPI        Objective    /70 (BP Location: Right arm, Patient Position: Sitting, Cuff Size: Adult Large)   Pulse 85   Temp 98.1  F (36.7  C) (Oral)   Resp 16   Wt 90.8 kg (200 lb 1.6 oz)   SpO2 97%   BMI 32.31 kg/m    Body mass index is 32.31 kg/m .  Physical Exam   GENERAL: healthy, alert and no distress  EYES: Eyes grossly normal to inspection  NECK: no asymmetry, masses, or scars  MS: no gross musculoskeletal defects noted, no edema  SKIN: surgical wound on left knee mostly well-healed scar with 1 cm open area at posterior of surgical site - removed dressing and  "packing- ~0.5 cm deep with granulation tissue, mild erythema at the edges of the wound, no significant swelling or pus  NEURO: mentation intact and speech normal  PSYCH: mentation appears normal and affect normal/bright      Diagnostic Test Results:  Labs reviewed in Epic        Assessment & Plan       ICD-10-CM    1. Seborrheic dermatitis L21.9 betamethasone valerate (VALISONE) 0.1 % external lotion    stable, refilled topical steroid cream.  Uses 1-2 times weekly.   2. Delayed surgical wound healing, subsequent encounter T81.89XD     Continue abx and daily dressing changes.  f/u surgery for continued management.   3. Encounter for medical examination to establish care Z00.00     Previous PCP Dr. Reyes - reviewed medical history and meds.    Not due for refills until annual physical in May 2020.        BMI:   Estimated body mass index is 32.31 kg/m  as calculated from the following:    Height as of 8/29/19: 1.676 m (5' 5.98\").    Weight as of this encounter: 90.8 kg (200 lb 1.6 oz).         Patient Instructions   It was a pleasure meeting you today.    During today's visit, we addressed the following:  -- Refills of betamethasone cream  -- Wound check - looks great!  That white material is granulation tissue (a scar forming).  -- Flu shot.  -- Follow-up with me around May 2020 for physical and refills.  (Fasting)    Do not hesitate to contact the clinic via [employer] or phone [phone] with questions about your health.    In addition to clinic appointments, we offer phone and E-visit encounters when deemed appropriate.          Return in about 7 months (around 5/15/2020) for Physical Exam.    Chris Flower MD  [facility]      "

## 2019-10-15 NOTE — PATIENT INSTRUCTIONS
It was a pleasure meeting you today.    During today's visit, we addressed the following:  -- Refills of betamethasone cream  -- Wound check - looks great!  That white material is granulation tissue (a scar forming).  -- Flu shot.  -- Follow-up with me around May 2020 for physical and refills.  (Fasting)    Do not hesitate to contact the clinic via [employer] or phone [phone] with questions about your health.    In addition to clinic appointments, we offer phone and E-visit encounters when deemed appropriate.

## 2019-10-17 ENCOUNTER — TRANSFERRED RECORDS (OUTPATIENT)
Dept: HEALTH INFORMATION MANAGEMENT | Facility: CLINIC | Age: 79
End: 2019-10-17

## 2019-10-24 ENCOUNTER — TELEPHONE (OUTPATIENT)
Dept: CARDIOLOGY | Facility: CLINIC | Age: 79
End: 2019-10-24

## 2019-10-24 NOTE — TELEPHONE ENCOUNTER
RN received overhead page from Dr. Ramos (plastic surgery) advising RN she is planning to perform plastic surgery on patient and would like Dr. Santo to provide recommendations for holding blood thinners/bridging etc d/t patient's two mechanical valves and hx of stroke. Patient's procedure is scheduled for 11/12/19. Dr. Martinez provided her direct cell phone for discuss ([phone]). RN will send to Dr. Santo for review and recommendation surrounding holding warfarin and plan for bridging and/or admission for IV Heparin.             7/8/19 OV note Dr. Santo  HISTORY OF PRESENT ILLNESS:  This is a brief note regarding Fausto Farr, who is scheduled for knee replacement surgery with Dr. Jensen on 07/22 at [facility].  This patient has an extensive cardiac history.  We have outlined it before, but to hit the highlights especially for the anesthesiologist, he has a history of atrial flutter status post ablation earlier this year.  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  He does not have any heart pauses, but he will need to be watched for pauses or Kaba-Rodríguez.  We do not put pacemakers in prophylactically for this, but that he should be watched for this.  He does have a history of obstructive sleep apnea and that will need to be watched during the time of anesthesia and hospitalization.  He does have heart valve disease, and that is the crux of this note regarding when he should be admitted for Coumadin switching over to IV heparin.  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  He has coronary disease with a LIMA graft to the LAD and radial artery graft to the right coronary artery.  His last EKG from about a month and half ago showed sinus rhythm with the first-degree heart block, right bundle branch and left anterior hemiblock.  Today his heart rate is 51  when the nurse checked it, although when I listened it was closer to 60.  Blood pressure is 126/58.  He is on aspirin, which I suspect the surgeon will be happy leaving on long-term, but he will need to talk to the surgeon about if the aspirin should stop it.  The issue is going to be more about the Coumadin.  His last several readings, his INR target is 2.5-3.5 and he has been running right close to the top of that, as opposed to the beginning of the year when he was running lower.  I am going to ask Dr. Reyes to discuss with the patient at his office visit today the timing.  If the surgery is scheduled for Monday, 07/22, the patient tells me he is scheduled for an INR on 07/18, which is Thursday.  I would probably recommend that the Coumadin be stopped either 07/16 or 07/17, get the INR checked on 07/18.  If the INR is still above 3.0, he will probably get admitted to the hospital on Saturday the 20th  If the INR is already at 2.0-2.5 or less on the 18th, then he should probably be admitted to the hospital on the 19th.  Obviously, it would have been perhaps easier if the surgery was done on a Thursday or so, and that would allow the patient to go into the clinic each day, but I do not think the clinic is open on Saturday and Sunday to get INR checks, and I am trying to limit the number of times the patient might have to be brought into the hospital for an INR and then sent home if his INR is too high, and I do not want the surgery to be canceled on the 22nd if the INR is still too high versus I do not want the patient to be sitting in the hospital getting IV heparin for days on end until the 22nd arrives.  Therefore, I did give the patient a note to give to Dr. Reyes if he wants to discuss it with me, but again my thought would be, if the patient is scheduled for an INR on the 18th, then stop the Coumadin on the 17th or perhaps even the 16th, and it depends on how high that INR is on the 18th which day he should  then come to the hospital.  I am not recommending Lovenox only because I think the patient is at higher than average risk being off the blood thinner.  He has 2 mechanical valves, one of which is a repeat.  He had a previous history of stroke and atrial flutter, although that should be ablated, and I am going to follow the more traditional recommendation of IV heparin versus subcutaneous Lovenox.  His other medicines can be continued as scheduled.  They may need to hold the metoprolol if the heart rate is running low.  I would have no problem that being held the day before or the day of surgery.  He is on a relatively low dose, but again he runs sinus rhythm with sinus bradycardia and the AV and bundle branch blocks as I listed above.        PHYSICAL EXAMINATION:  Exam today was very limited.  His lungs are completely clear.  Cardiac exam reveals heart rates in the 60s and it is a good mechanical click, no rub.  He does have varicose veins, which was addressed before.  He developed an unusual issue where apparently the bands were so tight, he developed blistering and there indeed is an area of scarring in site of what was probably old blood that has since been absorbed.  Those are going to be nonissues in terms of the surgery, but again there is always concern about venous thrombosis after orthopedic surgery, and this patient would be at somewhat higher risk because of the varicose veins already, so the timing of the blood thinners is going to be very important.        Other notes, he had a nuclear stress test in 04/2019 that showed probably an old inferior infarct and perhaps apical, but no ischemia.  His ejection fraction was normal at the time of that nuclear test.  His last echocardiogram was a transesophageal echo in 05/2018 that again showed normal LV function, mildly decreased RV systolic function that is probably related to his sleep apnea and CPAP.  His mechanical mitral and aortic valves were working well.         I am available if Dr. Reyes wishes to call us; otherwise, [facility] is available every day at [facility] if the hospitalist needs consultation.      Total visit time is 35 minutes, greater than 50% counseling.      Calderon Santo MD

## 2019-10-25 ENCOUNTER — OFFICE VISIT (OUTPATIENT)
Dept: PEDIATRICS | Facility: CLINIC | Age: 79
End: 2019-10-25
Payer: MEDICARE

## 2019-10-25 VITALS
SYSTOLIC BLOOD PRESSURE: 116 MMHG | TEMPERATURE: 98 F | BODY MASS INDEX: 32.43 KG/M2 | DIASTOLIC BLOOD PRESSURE: 71 MMHG | WEIGHT: 200.8 LBS | OXYGEN SATURATION: 100 % | HEART RATE: 77 BPM

## 2019-10-25 DIAGNOSIS — Z01.818 PREOP GENERAL PHYSICAL EXAM: Primary | ICD-10-CM

## 2019-10-25 DIAGNOSIS — Z23 NEED FOR PROPHYLACTIC VACCINATION AND INOCULATION AGAINST INFLUENZA: ICD-10-CM

## 2019-10-25 DIAGNOSIS — Z95.1 S/P CABG (CORONARY ARTERY BYPASS GRAFT): ICD-10-CM

## 2019-10-25 DIAGNOSIS — Z79.01 LONG TERM CURRENT USE OF ANTICOAGULANT THERAPY: ICD-10-CM

## 2019-10-25 DIAGNOSIS — I73.9 PVD (PERIPHERAL VASCULAR DISEASE) (H): ICD-10-CM

## 2019-10-25 DIAGNOSIS — G47.33 OSA (OBSTRUCTIVE SLEEP APNEA): ICD-10-CM

## 2019-10-25 DIAGNOSIS — D64.9 ANEMIA, UNSPECIFIED TYPE: ICD-10-CM

## 2019-10-25 DIAGNOSIS — I48.91 ATRIAL FIBRILLATION, UNSPECIFIED TYPE (H): ICD-10-CM

## 2019-10-25 DIAGNOSIS — Z95.2 S/P MITRAL VALVE REPLACEMENT: ICD-10-CM

## 2019-10-25 DIAGNOSIS — I50.22 CHRONIC SYSTOLIC CONGESTIVE HEART FAILURE (H): ICD-10-CM

## 2019-10-25 DIAGNOSIS — I10 ESSENTIAL HYPERTENSION, BENIGN: ICD-10-CM

## 2019-10-25 LAB
ERYTHROCYTE [DISTWIDTH] IN BLOOD BY AUTOMATED COUNT: 13.6 % (ref 10–15)
HCT VFR BLD AUTO: 37.8 % (ref 40–53)
HGB BLD-MCNC: 12 G/DL (ref 13.3–17.7)
MCH RBC QN AUTO: 28.5 PG (ref 26.5–33)
MCHC RBC AUTO-ENTMCNC: 31.7 G/DL (ref 31.5–36.5)
MCV RBC AUTO: 90 FL (ref 78–100)
PLATELET # BLD AUTO: 377 10E9/L (ref 150–450)
RBC # BLD AUTO: 4.21 10E12/L (ref 4.4–5.9)
WBC # BLD AUTO: 8.6 10E9/L (ref 4–11)

## 2019-10-25 PROCEDURE — 99214 OFFICE O/P EST MOD 30 MIN: CPT | Mod: 25 | Performed by: NURSE PRACTITIONER

## 2019-10-25 PROCEDURE — G0008 ADMIN INFLUENZA VIRUS VAC: HCPCS | Performed by: NURSE PRACTITIONER

## 2019-10-25 PROCEDURE — 85027 COMPLETE CBC AUTOMATED: CPT | Performed by: NURSE PRACTITIONER

## 2019-10-25 PROCEDURE — 90662 IIV NO PRSV INCREASED AG IM: CPT | Performed by: NURSE PRACTITIONER

## 2019-10-25 PROCEDURE — 36415 COLL VENOUS BLD VENIPUNCTURE: CPT | Performed by: NURSE PRACTITIONER

## 2019-10-25 NOTE — PROGRESS NOTES
[facility] [place]  [address]  [address]  [place] [address]  [phone]  Dept: [phone]    PRE-OP EVALUATION:  Today's date: 10/25/2019    Fausto Farr (: 1940) presents for pre-operative evaluation assessment as requested by Dr. Martinez.  He requires evaluation and anesthesia risk assessment prior to undergoing surgery/procedure for treatment of  .    Proposed Surgery/ Procedure: RIGHT GASTRONEMIUS FLAP TO RIGHT KNEE, SPLIT THICKNESS SKIN GRAFT FROM RIGHT THIGH TO RIGHT KNEE  Date of Surgery/ Procedure: 19  Time of Surgery/ Procedure: [facility]  Hospital/Surgical Facility: [facility]  Primary Physician: Miguelina Gonzalez DNP  Type of Anesthesia Anticipated: General    Patient has a Health Care Directive or Living Will:  YES On file    1. YES - DO YOU HAVE A HISTORY OF HEART ATTACK, STROKE, STENT, BYPASS OR SURGERY ON AN ARTERY IN THE HEAD, NECK, HEART OR LEG? History of stroke and MI  2. NO - Do you ever have any pain or discomfort in your chest?  3. NO - Do you have a history of  Heart Failure?  4. YES - ARE YOUR TROUBLED BY SHORTNESS OF BREATH WHEN WALKING ON THE LEVEL, UP A SLIGHT HILL OR AT NIGHT? History of GAGE  5. NO - Do you currently have a cold, bronchitis or other respiratory infection?  6. NO - Do you have a cough, shortness of breath or wheezing?  7. NO - Do you sometimes get pains in the calves of your legs when you walk?  8. NO - Do you or anyone in your family have previous history of blood clots?  9. NO - Do you or does anyone in your family have a serious bleeding problem such as prolonged bleeding following surgeries or cuts?  10. YES - HAVE YOU EVER HAD PROBLEMS WITH ANEMIA OR BEEN TOLD TO TAKE IRON PILLS? History of anemia, last hgb noted  11. NO - Have you had any abnormal blood loss such as black, tarry or bloody stools, or abnormal vaginal bleeding?  12. NO - Have you ever had a blood transfusion?  13. NO - Have you or any of your relatives ever had  problems with anesthesia?  14. YES - DO YOU HAVE SLEEP APNEA, EXCESSIVE SNORING OR DAYTIME DROWSINESS? GAGE  15. YES - DO YOU HAVE ANY PROSTHETIC HEART VALVES? Total aortic and mitral  16. YES - DO YOU HAVE PROSTHETIC JOINTS? Total knee  17. NO - Is there any chance that you may be pregnant?      HPI:     HPI related to upcoming procedure: history of total R knee last June and did not heal properly and had delayed healing.       CAD s/p CABG   ? On ASA, beta blocker, furosemide, crestor, ezetemibe    S/p aortic and mitral valve replacement (mechanical)  ? On warfarin    Atrial fibrillation and flutter    HTN    Systolic congestive heart failure (ICM)    Triple A - followed by vascular surgery - yearly surveillance    S/p total knee replacement June 2019 with delayed wound healing    S/p lumbar fusion surgery    Ulcerative colitis on mesalamine (prescribed by PCP)    Prostate Cancer    A-FIB - Patient has a longstanding history of chronic A-fib currently control. Current treatment regimen includes Warfarin for stroke prevention and denies significant symptoms of lightheadedness, palpitations or dyspnea.   INR   Date Value Ref Range Status   08/29/2019 2.31 (H) 0.86 - 1.14 Final     INR Protime   Date Value Ref Range Status   10/08/2019 3.0 (A) 0.86 - 1.14 Final        ANEMIA - Patient has a recent history of moderate-severe anemia, which has not been symptomatic. Work up to date has revealed below.   Hemoglobin   Date Value Ref Range Status   10/25/2019 12.0 (L) 13.3 - 17.7 g/dL Final       CHF - Patient has a longstanding history of moderate-severe CHF. Exacerbating conditions include hypertension and atrial fibrilation. Currently the patient's condition is same. Current treatment regimen includes beta blocker. The patient denies chest pain, edema, orthopnea, SOB or recent weight gain. Last Echocardiogram 7/2018, EKG 08/28/2019.     HYPERLIPIDEMIA - Patient has a long history of significant Hyperlipidemia requiring  medication for treatment with recent good control. Patient reports no problems or side effects with the medication.     HYPERTENSION - Patient has longstanding history of HTN , currently denies any symptoms referable to elevated blood pressure. Specifically denies chest pain, palpitations, dyspnea, orthopnea, PND or peripheral edema. Blood pressure readings have been in normal range. Current medication regimen is as listed below. Patient denies any side effects of medication.       MEDICAL HISTORY:     Patient Active Problem List    Diagnosis Date Noted     Sepsis due to group B Streptococcus (H) 05/19/2018     Priority: High     Total knee replacement status 07/22/2019     Priority: Medium     Knee pain, chronic 07/19/2019     Priority: Medium     Obesity (BMI 35.0-39.9) with comorbidity (H) 05/30/2019     Priority: Medium     Typical atrial flutter (H) 04/15/2019     Priority: Medium     Added automatically from request for surgery [id number]       GAGE (obstructive sleep apnea) 10/02/2017     Priority: Medium     Long term current use of anticoagulant therapy 03/21/2016     Priority: Medium     Chronic systolic congestive heart failure (H) 03/21/2016     Priority: Medium     S/P lumbar spinal fusion 10/29/2015     Priority: Medium     Personal history of tobacco use, presenting hazards to health 10/28/2015     Priority: Medium     Quit 2002       Spinal stenosis of lumbar region without neurogenic claudication 10/28/2015     Priority: Medium     S/P mitral valve replacement 10/28/2015     Priority: Medium     RBBB with left anterior fascicular block 10/28/2015     Priority: Medium     S/P aortic valve replacement      Priority: Medium     developed perivalve leak and MS, therefore redo surg 2013       S/P CABG (coronary artery bypass graft)      Priority: Medium     with AVR Aguirre-Lad, RA-Rca       Stented coronary artery      Priority: Medium     Mixed hyperlipidemia      Priority: Medium     Diagnosis updated by  automated process. Provider to review and confirm.       PVD (peripheral vascular disease) (H)      Priority: Medium     Abnormal ECG      Priority: Medium     RBBB, 1st degree AVB, Left axis deviation       ACP (advance care planning) 10/09/2015     Priority: Medium     Advance Care Planning 10/9/2015: Receipt of ACP document:  Received: Health Care Directive which was witnessed or notarized on 9-18-15.  Document not previously scanned.  Validation form completed and sent with document to be scanned.  Code Status reflects choices in most recent ACP document.  Confirmed/documented designated decision maker(s).  Added by Aster Rios RN Advance Care Planning Liaison with [employer]  Advance Care Planning 10/9/2015: ACP Review and Resources Provided:  Reviewed chart for advance care plan.  Fausto Farr has no plan or code status on file however states presence of ACP document. Copy requested. Confirmed code status reflects current choices pending receipt of document/advance care plan review. Confirmed/documented legally designated decision maker(s). Added by Aster Rios RN Advance Care Planning Liaison with [facility]           Urinary retention 12/04/2013     Priority: Medium     Health Care Home 10/24/2013     Priority: Medium     Status:  Closed  Care Coordinator:  Susan Carrillo RN CCM    See Letters for [facility] Care Plan         MRSA (methicillin resistant Staphylococcus aureus) 10/17/2013     Priority: Medium     Nares 10-16-13       Heart murmur      Priority: Medium     Valvular heart disease 09/16/2013     Priority: Medium     Mitral and Aortic Valve       Vitamin D deficiency disease 08/06/2013     Priority: Medium     Anemia relative at 12.5 in 8-13  08/06/2013     Priority: Medium     Essential hypertension, benign 08/06/2013     Priority: Medium     Hyperlipidemia LDL goal <100 08/06/2013     Priority: Medium     Prostate cancer (H) 08/06/2013     Priority: Medium     Glucose intolerance  (impaired glucose tolerance) 08/06/2013     Priority: Medium     Sciatica of left side since 2000 08/06/2013     Priority: Medium     Somatic dysfunction of pelvic region 10/18/2012     Priority: Medium     Problem list name updated by automated process. Provider to review       Contact dermatitis and other eczema, due to unspecified cause 10/18/2012     Priority: Medium     Ulcerative colitis (H) 10/18/2012     Priority: Medium     Problem list name updated by automated process. Provider to review       Atrial fibrillation (H) 10/18/2012     Priority: Medium     Other specified anemias 10/18/2012     Priority: Medium     Gastric ulcer 10/18/2012     Priority: Medium     Problem list name updated by automated process. Provider to review       Bilateral low back pain with left-sided sciatica 10/18/2012     Priority: Medium     Nonallopathic lesion of lumbar region 10/18/2012     Priority: Medium     Problem list name updated by automated process. Provider to review       Nonallopathic lesion of sacral region 10/18/2012     Priority: Medium     Problem list name updated by automated process. Provider to review       Diaphragmatic hernia 10/18/2012     Priority: Medium     Problem list name updated by automated process. Provider to review       Abdominal pain, epigastric 10/18/2012     Priority: Medium     Malaise and fatigue 10/18/2012     Priority: Medium     Problem list name updated by automated process. Provider to review       Nocturia 10/18/2012     Priority: Medium     Nonallopathic lesion of cervical region 10/18/2012     Priority: Medium     Problem list name updated by automated process. Provider to review       Nonallopathic lesion of thoracic region 10/18/2012     Priority: Medium     Problem list name updated by automated process. Provider to review       Malignant neoplasm of prostate (H) 10/18/2012     Priority: Medium     Abdominal aortic aneurysm (H) 10/18/2012     Priority: Medium     6.5 cm 4/2015  u/s         Nonallopathic lesion of rib cage 10/18/2012     Priority: Medium     Problem list name updated by automated process. Provider to review       Coronary artery disease 10/18/2012     Priority: Medium     AF (atrial fibrillation) (H) 10/18/2012     Priority: Medium     Rotator cuff (capsule) sprain 05/10/2010     Priority: Medium      Past Medical History:   Diagnosis Date     Abdominal pain      Abnormal ECG     RBBB, 1st degree AVB, Left axis deviation     Anemia     currently taking iron     Arrhythmia     pac, pvc     Back pain     since 1980     BPH (benign prostatic hyperplasia)      Bruit      CAD (coronary artery disease)      Cellulitis 10/18/12     Cellulitis 05/2018    GrpB strep LLE cellulitis  negative RACHAEL for veg     Chronic venous insufficiency     bilat lower extremities     Contact dermatitis and other eczema, due to unspecified cause      Diaphragmatic hernia without mention of obstruction or gangrene      Diastolic HF (heart failure) (H)      Gastric ulcer      Glucose intolerance (impaired glucose tolerance)      Heart murmur 9/16/13    valvular heart disease     Hyperlipidemia LDL goal <100 8/6/2013     Hypertension 8/6/13     Lumbago      Malaise and fatigue      Metabolic syndrome      Mobitz (type) I (Wenckebach's) atrioventricular block     and RBBB     Nocturia 10/18/12     Nonallopathic lesion of cervical region      Nonallopathic lesion of lumbar region      Nonallopathic lesion of pelvic region, not elsewhere classified      Nonallopathic lesion of rib cage      Nonallopathic lesion of sacral region      GAGE (obstructive sleep apnea)     CPAP     Paroxysmal atrial fibrillation (H) 10/18/12     Prostate cancer (H) 2008    radiation seed, XRT      PVD (peripheral vascular disease) (H)      RBBB     1st degree AVB, RBBB, LAHB     Rotator cuff strain     and sprain     S/P AAA repair      S/P aortic valve replacement 2006    developed perivalve leak and MS, therefore redo surg 2013      S/P CABG (coronary artery bypass graft) 2006    Lima-Lad, RA-Rca     Sciatica of left side     since 2000     Sepsis due to group B Streptococcus (H) 5/19/2018     Ulcerative colitis (H)      Varicose veins of bilateral lower extremities with other complications     s/p RLE vein stripping     Vitamin D deficiency      Past Surgical History:   Procedure Laterality Date     AORTIC VALVE REPLACEMENT  1/3/06    redo AVR SJM 21mm and SJM MVR 27mm in 2013SJM 21([id number]):AVR, SJM 27 :MVR-     ARTHROPLASTY KNEE      right knee     ARTHROPLASTY KNEE Right 7/22/2019    Procedure: Right total knee arthroplasty;  Surgeon: Prasanth Jensen MD;  Location:  OR     BACK SURGERY  Oct 2015    Fusion L4-5, laminectomy L2, L3     BYPASS GRAFT ARTERY CORONARY  10/2013    reimplantation of radial artery graft to RCA     C CABG, VEIN, TWO  1/3/06    Left radial to RCA, LIMA to LAD (RA to RCA reimplanted at time of 2013 surg)     CARDIAC CATHERIZATION  11/2005    Stent placed to RCA     CARDIAC CATHERIZATION  04/2013    Occluded RCA, patent LIMA to LAD and radial graft to PDA     CARPAL TUNNEL RELEASE RT/LT  1994     COLONOSCOPY  8-22-11     CYSTOSCOPY FLEXIBLE  10/16/2013    Procedure: CYSTOSCOPY FLEXIBLE;  FLEXIBLE CYSTOSCOPY / DILATION OF URETHRA / INSERTION OF LESLIE;  Surgeon: Cooper Wallace MD;  Location:  OR     ENDOVASCULAR REPAIR, INFRARENAL ABDOMINAL AORTIC ANEURYSM/DISSECTION; MODULAR BIFURCATED PROSTHESIS  2006    AAA repair endovascular     ENT SURGERY       EP ABLATION ATRIAL FLUTTER N/A 4/22/2019    Procedure: EP Ablation Atrial Flutter;  Surgeon: Jessy Vasquez MD;  Location:  HEART CARDIAC CATH LAB     GENITOURINARY SURGERY  6/16/08    radioactive seeding     HEAD & NECK SURGERY  1997    vocal cord polypectomy     INCISION AND DRAINAGE KNEE, COMBINED Right 8/29/2019    Procedure: INCISION AND DRAINAGE, KNEE W/ Secondary Wound Closure;  Surgeon: Prasanth Jensen MD;  Location:  OR      KNEE SURGERY  2001 Right knee arthroscopy     OPTICAL TRACKING SYSTEM FUSION SPINE POSTERIOR LUMBAR THREE+ LEVELS N/A 10/29/2015    Procedure: OPTICAL TRACKING SYSTEM FUSION SPINE POSTERIOR LUMBAR THREE+ LEVELS;  Surgeon: Walt Garcia MD;  Location: [facility] OR     PROSTATE SURGERY      radioactive seeding 6/16/08     REPAIR ANEURYSM ABDOMINAL AORTA  06/08     REPAIR VALVE MITRAL  10/16/2013    SJM 21([id number] [id number]):AVR, SJM 27 :MVRProcedure: REPAIR VALVE MITRAL;  REDO STERNOTOMY/REDO AORTIC VALVE REPLACEMENT/ MITRAL VALVE REPLACEMENT/REIMPLANTATION OF RIGHT CORONARY ARTERY BYPASS WITH RACHAEL ( ON PUMP);  Surgeon: Viet Singh MD;  Location: [facility] OR     REPLACE VALVE AORTIC  10/16/2013    Procedure: REPLACE VALVE AORTIC;;  Surgeon: Viet Singh MD;  Location: [facility] OR     SURGERY GENERAL IP CONSULT  05/2008 Excision aneurysm abdominal aorta     SURGERY GENERAL IP CONSULT  1997 Vocal cord polypectomy     VASCULAR SURGERY  1968, 1993     varicose vein stripping     Current Outpatient Medications   Medication Sig Dispense Refill     betamethasone valerate (VALISONE) 0.1 % external lotion Apply topically 2 times daily Apply topically to scalp twice daily PRN 60 mL 3     cefadroxil (DURICEF) 500 MG capsule TK 1 C PO BID  3     ezetimibe (ZETIA) 10 MG tablet Take 1 tablet (10 mg) by mouth daily 90 tablet 3     fluocinonide (LIDEX) 0.05 % external ointment Apply topically 2 times daily as needed       furosemide (LASIX) 40 MG tablet Take 0.5 tablets (20 mg) by mouth daily 45 tablet 3     mesalamine (ASACOL HD) 800 MG EC tablet Take 1 tablet (800 mg) by mouth 2 times daily 180 tablet 11     metoprolol tartrate (LOPRESSOR) 25 MG tablet Take 1 tablet (25 mg) by mouth 2 times daily 180 tablet 3     rosuvastatin (CRESTOR) 40 MG tablet Take 1 tablet (40 mg) by mouth daily 90 tablet 3     tamsulosin (FLOMAX) 0.4 MG capsule TAKE 1 CAPSULE BY MOUTH EVERY DAY 90 capsule 3     warfarin ANTICOAGULANT (COUMADIN) 5 MG  tablet Take 5 mg (1 tablet) every Mon, Wed, Fri; 7.5 mg (1.5 tablets) all other days or as directed by INR Clinic 120 tablet 3     acetaminophen (TYLENOL) 325 MG tablet Take 1-2 tablets (325-650 mg) by mouth every 6 hours as needed for mild pain 250 tablet 11     OTC products: none    Allergies   Allergen Reactions     Bees Anaphylaxis      Latex Allergy: NO    Social History     Tobacco Use     Smoking status: Former Smoker     Packs/day: 1.00     Years: 40.00     Pack years: 40.00     Start date: 4/15/1962     Last attempt to quit: 10/23/2002     Years since quittin.0     Smokeless tobacco: Never Used   Substance Use Topics     Alcohol use: Yes     Comment: a couple beers per week (socially)     History   Drug Use No       REVIEW OF SYSTEMS:   Constitutional, neuro, ENT, endocrine, pulmonary, cardiac, gastrointestinal, genitourinary, musculoskeletal, integument and psychiatric systems are negative, except as otherwise noted.    EXAM:   /71   Pulse 77   Temp 98  F (36.7  C) (Oral)   Wt 91.1 kg (200 lb 12.8 oz)   SpO2 100%   BMI 32.43 kg/m      GENERAL APPEARANCE: healthy, alert and no distress     EYES: EOMI,  PERRL     HENT: ear canals and TM's normal and nose and mouth without ulcers or lesions     NECK: no adenopathy, no asymmetry, masses, or scars and thyroid normal to palpation     RESP: lungs clear to auscultation - no rales, rhonchi or wheezes     CV: regular rates and rhythm, normal S1 S2, no S3 or S4 and no murmur, click or rub     SKIN: no suspicious lesions or rashes     PSYCH: mentation appears normal. and affect normal/bright    DIAGNOSTICS:     EKG: RBBB and old infarct noted on EKG from 19  Labs Drawn and in Process:   Unresulted Labs Ordered in the Past 30 Days of this Admission     No orders found from 2019 to 10/26/2019.          Recent Labs   Lab Test 10/08/19  0928 19  1201 19  1429  19  1615  19  0648 19  0623  17  1244   10/19/13  0430   HGB  --   --   --   --  12.6*  --  10.6*  --  9.6* 10.4*   < > 12.9*   < > 8.0*   PLT  --   --   --   --  369  --  652*  --  232 238   < > 282   < > 155   INR 3.0* 2.9*   < > 2.31* 2.64*   < >  --    < > 1.41* 1.11   < >  --    < > 1.23*   NA  --   --   --   --   --   --   --   --  133 136   < > 139   < > 134   POTASSIUM  --   --   --  3.9  --   --   --   --  4.3 3.9   < > 4.5   < > 4.8   CR  --   --   --  0.98  --   --   --   --  1.10 1.02   < > 1.00   < > 1.22   A1C  --   --   --   --   --   --   --   --   --   --   --  5.9  --  6.8*    < > = values in this interval not displayed.        IMPRESSION:   Reason for surgery/procedure: preop  Diagnosis/reason for consult: unhealing lesion s/p R total knee    The proposed surgical procedure is considered INTERMEDIATE risk.    REVISED CARDIAC RISK INDEX  The patient has the following serious cardiovascular risks for perioperative complications such as (MI, PE, VFib and 3  AV Block):  No serious cardiac risks  INTERPRETATION: 1 risks: Class II (low risk - 0.9% complication rate)    The patient has the following additional risks for perioperative complications:  No identified additional risks      ICD-10-CM    1. Preop general physical exam Z01.818 CBC with platelets   2. Need for prophylactic vaccination and inoculation against influenza Z23 INFLUENZA (HIGH DOSE) 3 VALENT VACCINE [27118]     ADMIN INFLUENZA (For MEDICARE Patients ONLY) []     CBC with platelets   3. Chronic systolic congestive heart failure (H) I50.22 CBC with platelets   4. Long term current use of anticoagulant therapy Z79.01 CBC with platelets   5. S/P CABG (coronary artery bypass graft) Z95.1 CBC with platelets   6. GAGE (obstructive sleep apnea) G47.33 CBC with platelets   7. PVD (peripheral vascular disease) (H) I73.9 CBC with platelets   8. S/P mitral valve replacement Z95.2 CBC with platelets   9. Anemia, unspecified type D64.9 CBC with platelets   10. Essential hypertension,  benign I10 CBC with platelets   11. Atrial fibrillation, unspecified type (H) I48.91 CBC with platelets       RECOMMENDATIONS:     --Consult hospital rounder / IM to assist post-op medical management    Stop all NSAIDs for 10 days prior to procedure.     Cardiovascular Risk  S/p mitral valva nd aortic valve replacement, paroxysmal  Fib.   Plan: continue coumadin. If INR < 1.5, he will need admission for IV heparin briding. Today's INR: has INR appointment the wk before surgery.     Cardiology is recommending admission 2 days prior to surgery for IV heparin, instead of lovenox. Dr. Santo (p ).  We will stop his coumadin the day before his admission for IV heparin. Has INR appointment     CAD involving native coronary artery 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  Plan: currently stable, last EKG 8/28/19, currently stable    Obstructive Sleep Apnea  Plan: patient bring home CPAP on day of surgery    --Patient is to take all scheduled medications on the day of surgery EXCEPT for modifications listed below including NSAIDs    APPROVAL GIVEN to proceed with proposed procedure, without further diagnostic evaluation       Signed Electronically by: ADELE Bee CNP    Copy of this evaluation report is provided to requesting physician.    [facility] Preop Guidelines    Revised Cardiac Risk Index

## 2019-10-28 ENCOUNTER — OFFICE VISIT (OUTPATIENT)
Dept: SLEEP MEDICINE | Facility: CLINIC | Age: 79
End: 2019-10-28
Payer: MEDICARE

## 2019-10-28 VITALS
HEART RATE: 75 BPM | OXYGEN SATURATION: 96 % | SYSTOLIC BLOOD PRESSURE: 112 MMHG | WEIGHT: 200 LBS | DIASTOLIC BLOOD PRESSURE: 67 MMHG | BODY MASS INDEX: 32.14 KG/M2 | HEIGHT: 66 IN

## 2019-10-28 DIAGNOSIS — G47.33 OSA (OBSTRUCTIVE SLEEP APNEA): Primary | ICD-10-CM

## 2019-10-28 PROCEDURE — 99214 OFFICE O/P EST MOD 30 MIN: CPT | Performed by: INTERNAL MEDICINE

## 2019-10-28 ASSESSMENT — MIFFLIN-ST. JEOR: SCORE: 1569.69

## 2019-10-28 NOTE — PROGRESS NOTES
[facility]  Outpatient Sleep Medicine Consultation  October 28, 2019      Name: Fausto Farr MRN# [id number]   Age: 79 year old YOB: 1940     Date of Consultation: October 28, 2019  Consultation is requested by: No referring provider defined for this encounter.  Primary care provider: Jodi Flower Mai             Assessment and Plan:       Summary Diagnoses:      Moderate obstructive sleep apnea with suboptimal CPAP use due to intercurrent pain related to surgery, incompletely healed surgical wound, knee pain.    Status post coronary artery bypass graft, mitral valve replacement; atrial fibrillation    Summary Recommendations:      Continue CPAP; replace mask upon awakenings    STM followup over next 2 weeks-setup followup in January if fails to improve adhererncee             History of Present Illness:     Fausto Farr is a 79 year old male with moderate obstructive sleep apnea and incomplete adherence with current auto titration 6-16.  This gentleman is had a knee replacement with subsequent dehiscence and exposure requiring a scheduled soft tissue repair.  During this period of time he has had an open wound with chronic knee pain and difficulty maintaining sleep and using his CPAP.  He does not appear to be having problems with interface or excessive leaking contributing to his adherence problems.      His original diagnosis was moderate obstructive sleep apnea with hypoxemia and he was initiated on CPAP largely because of coexisting cardiovascular disease and atrial fibrillation.  He has not had subjective improvement with use of CPAP.  He has noted resolution of snoring on occasional nights when he does not use the device but would not be interested in all studies to determine whether he has residual disease.    PREVIOUS SLEEP STUDIES:  Date: August 9, 2016  AHI: 18.8 with hypoxemia 11.3 minutes less than or equal saturation 88%  Intervention:  CPAP  CPAP Compliance:  Dates: September 28 to October 27, 2019       53 % >4hour use       Average Use  4.2 hours  Leak 95th percentile 38 and up to 40  Residual AHI 1.2           Medications:     Current Outpatient Medications   Medication Sig     acetaminophen (TYLENOL) 325 MG tablet Take 1-2 tablets (325-650 mg) by mouth every 6 hours as needed for mild pain     betamethasone valerate (VALISONE) 0.1 % external lotion Apply topically 2 times daily Apply topically to scalp twice daily PRN     cefadroxil (DURICEF) 500 MG capsule TK 1 C PO BID     ezetimibe (ZETIA) 10 MG tablet Take 1 tablet (10 mg) by mouth daily     fluocinonide (LIDEX) 0.05 % external ointment Apply topically 2 times daily as needed     furosemide (LASIX) 40 MG tablet Take 0.5 tablets (20 mg) by mouth daily     mesalamine (ASACOL HD) 800 MG EC tablet Take 1 tablet (800 mg) by mouth 2 times daily     metoprolol tartrate (LOPRESSOR) 25 MG tablet Take 1 tablet (25 mg) by mouth 2 times daily     rosuvastatin (CRESTOR) 40 MG tablet Take 1 tablet (40 mg) by mouth daily     tamsulosin (FLOMAX) 0.4 MG capsule TAKE 1 CAPSULE BY MOUTH EVERY DAY     warfarin ANTICOAGULANT (COUMADIN) 5 MG tablet Take 5 mg (1 tablet) every Mon, Wed, Fri; 7.5 mg (1.5 tablets) all other days or as directed by INR Clinic     No current facility-administered medications for this visit.         Allergies   Allergen Reactions     Bees Anaphylaxis            Past Medical History:     Does not need 02 supplement at night   Past Medical History:   Diagnosis Date     Abdominal pain      Abnormal ECG     RBBB, 1st degree AVB, Left axis deviation     Anemia     currently taking iron     Arrhythmia     pac, pvc     Back pain     since 1980     BPH (benign prostatic hyperplasia)      Bruit      CAD (coronary artery disease)      Cellulitis 10/18/12     Cellulitis 05/2018    GrpB strep LLE cellulitis  negative RACHAEL for veg     Chronic venous insufficiency     bilat lower extremities      Contact dermatitis and other eczema, due to unspecified cause      Diaphragmatic hernia without mention of obstruction or gangrene      Diastolic HF (heart failure) (H)      Gastric ulcer      Glucose intolerance (impaired glucose tolerance)      Heart murmur 9/16/13    valvular heart disease     Hyperlipidemia LDL goal <100 8/6/2013     Hypertension 8/6/13     Lumbago      Malaise and fatigue      Metabolic syndrome      Mobitz (type) I (Wenckebach's) atrioventricular block     and RBBB     Nocturia 10/18/12     Nonallopathic lesion of cervical region      Nonallopathic lesion of lumbar region      Nonallopathic lesion of pelvic region, not elsewhere classified      Nonallopathic lesion of rib cage      Nonallopathic lesion of sacral region      GAGE (obstructive sleep apnea)     CPAP     Paroxysmal atrial fibrillation (H) 10/18/12     Prostate cancer (H) 2008    radiation seed, XRT      PVD (peripheral vascular disease) (H)      RBBB     1st degree AVB, RBBB, LAHB     Rotator cuff strain     and sprain     S/P AAA repair      S/P aortic valve replacement 2006    developed perivalve leak and MS, therefore redo surg 2013     S/P CABG (coronary artery bypass graft) 2006    Lima-Lad, RA-Rca     Sciatica of left side     since 2000     Sepsis due to group B Streptococcus (H) 5/19/2018     Ulcerative colitis (H)      Varicose veins of bilateral lower extremities with other complications     s/p RLE vein stripping     Vitamin D deficiency              Past Surgical History:    No h/o  upper airway surgery  Past Surgical History:   Procedure Laterality Date     AORTIC VALVE REPLACEMENT  1/3/06    redo AVR SJM 21mm and SJM MVR 27mm in 2013SJM 21([id number]):AVR, SJM 27 :MVR-     ARTHROPLASTY KNEE      right knee     ARTHROPLASTY KNEE Right 7/22/2019    Procedure: Right total knee arthroplasty;  Surgeon: Prasanth Jensen MD;  Location: [facility] OR     BACK SURGERY  Oct 2015    Fusion L4-5, laminectomy L2, L3     BYPASS  GRAFT ARTERY CORONARY  10/2013    reimplantation of radial artery graft to RCA     C CABG, VEIN, TWO  1/3/06    Left radial to RCA, LIMA to LAD (RA to RCA reimplanted at time of 2013 surg)     CARDIAC CATHERIZATION  11/2005    Stent placed to RCA     CARDIAC CATHERIZATION  04/2013    Occluded RCA, patent LIMA to LAD and radial graft to PDA     CARPAL TUNNEL RELEASE RT/LT  1994     COLONOSCOPY  8-22-11     CYSTOSCOPY FLEXIBLE  10/16/2013    Procedure: CYSTOSCOPY FLEXIBLE;  FLEXIBLE CYSTOSCOPY / DILATION OF URETHRA / INSERTION OF LESLIE;  Surgeon: Cooper Wallace MD;  Location:  OR     ENDOVASCULAR REPAIR, INFRARENAL ABDOMINAL AORTIC ANEURYSM/DISSECTION; MODULAR BIFURCATED PROSTHESIS  2006    AAA repair endovascular     ENT SURGERY       EP ABLATION ATRIAL FLUTTER N/A 4/22/2019    Procedure: EP Ablation Atrial Flutter;  Surgeon: Jessy Vasquez MD;  Location:  [facility]ARD[facility] CATH LAB     GENITOURINARY SURGERY  6/16/08    radioactive seeding     HEAD & NECK SURGERY  1997    vocal cord polypectomy     INCISION AND DRAINAGE KNEE, COMBINED Right 8/29/2019    Procedure: INCISION AND DRAINAGE, KNEE W/ Secondary Wound Closure;  Surgeon: Prasanth Jensen MD;  Location: [facility] OR     KNEE SURGERY  2001 Right knee arthroscopy     OPTICAL TRACKING SYSTEM FUSION SPINE POSTERIOR LUMBAR THREE+ LEVELS N/A 10/29/2015    Procedure: OPTICAL TRACKING SYSTEM FUSION SPINE POSTERIOR LUMBAR THREE+ LEVELS;  Surgeon: Walt Garcia MD;  Location:  OR     PROSTATE SURGERY      radioactive seeding 6/16/08     REPAIR ANEURYSM ABDOMINAL AORTA  06/08     REPAIR VALVE MITRAL  10/16/2013    SJM 21([id number] [id number]):AVR, SJM 27  501:MVRProcedure: REPAIR VALVE MITRAL;  REDO STERNOTOMY/REDO AORTIC VALVE REPLACEMENT/ MITRAL VALVE REPLACEMENT/REIMPLANTATION OF RIGHT CORONARY ARTERY BYPASS WITH RACHAEL ( ON PUMP);  Surgeon: Viet Singh MD;  Location:  OR     REPLACE VALVE AORTIC  10/16/2013    Procedure: REPLACE VALVE AORTIC;;   Surgeon: Viet Singh MD;  Location: [facility] OR     SURGERY GENERAL IP CONSULT  05/2008 Excision aneurysm abdominal aorta     SURGERY GENERAL IP CONSULT  1997 Vocal cord polypectomy     VASCULAR SURGERY  1968, 1993     varicose vein stripping                      Physical Examination:   There were no vitals taken for this visit.             Copy to: Jodi Flower Mai  2018 echocardiogram:Interpretation Summary     1. The left ventricle is normal in structure, function and size. The visual  ejection fraction is estimated at 55%.  2. The right ventricle is mildly dilated. Mildly decreased right ventricular  systolic function  3. Mechanical mitral valve. 27mm St Solo. Both leaflets open well, mean  gradient 4-5mmHg (normal). Physiologic MR. No vegetations.  4. There is a mechanical aortic valve. 21mm St Solo. Views are limited of the  valve leaflets. Mean gradient 11mmHg. No AI. Probably no vegetations.     No changes compared to TTE from 5-18-18.      MU AVALOS MD 10/28/2019     [facility]   Floor 1, [address]   [address] [place] [address]   Appointments: [phone]    [facility]  3rd Floor  [address], [place] [address]     Total time spent with patient: 25 min >50% counseling

## 2019-10-28 NOTE — NURSING NOTE
"Chief Complaint   Patient presents with     RECHECK       Initial /67   Pulse 75   Ht 1.676 m (5' 5.98\")   Wt 90.7 kg (200 lb)   SpO2 96%   BMI 32.30 kg/m   Estimated body mass index is 32.3 kg/m  as calculated from the following:    Height as of this encounter: 1.676 m (5' 5.98\").    Weight as of this encounter: 90.7 kg (200 lb).    Medication Reconciliation: complete        DME: yes   airsense 10    ESS 2  "

## 2019-10-28 NOTE — PATIENT INSTRUCTIONS
Your blood pressure was checked while you were in clinic today.  Please read the guidelines below about what these numbers mean and what you should do about them.  Your systolic blood pressure is the top number.  This is the pressure when the heart is pumping.  Your diastolic blood pressure is the bottom number.  This is the pressure in between beats.  If your systolic blood pressure is less than 120 and your diastolic blood pressure is less than 80, then your blood pressure is normal. There is nothing more that you need to do about it  If your systolic blood pressure is 120-139 or your diastolic blood pressure is 80-89, your blood pressure may be higher than it should be.  You should have your blood pressure re-checked within a year by a primary care provider.  If your systolic blood pressure is 140 or greater or your diastolic blood pressure is 90 or greater, you may have high blood pressure.  High blood pressure is treatable, but if left untreated over time it can put you at risk for heart attack, stroke, or kidney failure.  You should have your blood pressure re-checked by a primary care provider within the next four weeks.    Your BMI is There is no height or weight on file to calculate BMI.  Weight management is a personal decision.  If you are interested in exploring weight loss strategies, the following discussion covers the approaches that may be successful. Body mass index (BMI) is one way to tell whether you are at a healthy weight, overweight, or obese. It measures your weight in relation to your height.  A BMI of 18.5 to 24.9 is in the healthy range. A person with a BMI of 25 to 29.9 is considered overweight, and someone with a BMI of 30 or greater is considered obese. More than two-thirds of American adults are considered overweight or obese.  Being overweight or obese increases the risk for further weight gain. Excess weight may lead to heart disease and diabetes.  Creating and following plans for  healthy eating and physical activity may help you improve your health.  Weight control is part of healthy lifestyle and includes exercise, emotional health, and healthy eating habits. Careful eating habits lifelong are the mainstay of weight control. Though there are significant health benefits from weight loss, long-term weight loss with diet alone may be very difficult to achieve- studies show long-term success with dietary management in less than 10% of people. Attaining a healthy weight may be especially difficult to achieve in those with severe obesity. In some cases, medications, devices and surgical management might be considered.  What can you do?  If you are overweight or obese and are interested in methods for weight loss, you should discuss this with your provider.     Consider reducing daily calorie intake by 500 calories.     Keep a food journal.     Avoiding skipping meals, consider cutting portions instead.    Diet combined with exercise helps maintain muscle while optimizing fat loss. Strength training is particularly important for building and maintaining muscle mass. Exercise helps reduce stress, increase energy, and improves fitness. Increasing exercise without diet control, however, may not burn enough calories to loose weight.       Start walking three days a week 10-20 minutes at a time    Work towards walking thirty minutes five days a week     Eventually, increase the speed of your walking for 1-2 minutes at time    In addition, we recommend that you review healthy lifestyles and methods for weight loss available through the National Institutes of Health patient information sites:  http://win.niddk.nih.gov/publications/index.htm    And look into health and wellness programs that may be available through your health insurance provider, employer, local community center, or [employer].

## 2019-10-28 NOTE — Clinical Note
Patient rarely adherent but very close. He had one month that made the zaira but recent pain problems. Putting him in orange box for coaching with replacing mask after awakening to see if we can improve use

## 2019-11-04 NOTE — TELEPHONE ENCOUNTER
Did call DR Ramos per DR Santo. Did use DR Santo note from 7/2019 OV to explain timing of INR's with admitting Pt to hospital for Heparin drip Pt has two mechanical valves with Hx of stroke. Informed Her Pt needs INR's done daily once he starts holding coumadin and needs to be admitted to hospital when in 2.0-2.5 range and preferable 2.5 being his range is 2.5-3.5. Again surgery set for 11/12/19 which means Pt needs INR's over the weekend and who is going to manage them, and admit Pt when INR is in range. It would be preferable Pt does not have to sit in hospital for any long period of time waiting for INR to drop. Pt's INR's are followed by PMD presently. MAHSA Rich RN

## 2019-11-05 ENCOUNTER — ANTICOAGULATION THERAPY VISIT (OUTPATIENT)
Dept: NURSING | Facility: CLINIC | Age: 79
End: 2019-11-05
Payer: MEDICARE

## 2019-11-05 DIAGNOSIS — Z79.01 LONG TERM CURRENT USE OF ANTICOAGULANT THERAPY: Primary | ICD-10-CM

## 2019-11-05 DIAGNOSIS — Z95.2 S/P MITRAL VALVE REPLACEMENT: ICD-10-CM

## 2019-11-05 DIAGNOSIS — I48.91 AF (ATRIAL FIBRILLATION) (H): ICD-10-CM

## 2019-11-05 LAB — INR POINT OF CARE: 2 (ref 0.86–1.14)

## 2019-11-05 PROCEDURE — 85610 PROTHROMBIN TIME: CPT | Mod: QW

## 2019-11-05 PROCEDURE — 36416 COLLJ CAPILLARY BLOOD SPEC: CPT

## 2019-11-05 NOTE — PROGRESS NOTES
ANTICOAGULATION FOLLOW-UP CLINIC VISIT    Patient Name:  Fausto Farr  Date:  2019  Contact Type:  Face to Face    SUBJECTIVE:  Patient Findings     Comments:   He is scheduled for surgery on 19.  RIGHT GASTRONEMIUS FLAP TO RIGHT KNEE,   SPLIT THICKNESS SKIN GRAFT FROM   RIGHT THIGH TO RIGHT KNEE    Patient denies: any missed doses,   increased intake of green vegetables,   medication changes, bleeding/bruising,   or signs/symptoms of a clot.    INR is subtherapeutic today.   Patient will take 10 mg today and 7.5 mg tomorrow   which will increase weekly total   by 11%, then resume maintenance dose.   Follow up in 3 days.     If INR is 2.0-2.5 or less on Friday,   he will need to go to the hospital for heparin on Saturday.  If INR is >3.0 on Friday, he can wait until    to go to the hospital for heparin.        Clinical Outcomes     Comments:   He is scheduled for surgery on 19.  RIGHT GASTRONEMIUS FLAP TO RIGHT KNEE,   SPLIT THICKNESS SKIN GRAFT FROM   RIGHT THIGH TO RIGHT KNEE    Patient denies: any missed doses,   increased intake of green vegetables,   medication changes, bleeding/bruising,   or signs/symptoms of a clot.    INR is subtherapeutic today.   Patient will take 10 mg today and 7.5 mg tomorrow   which will increase weekly total   by 11%, then resume maintenance dose.   Follow up in 3 days.     If INR is 2.0-2.5 or less on Friday,   he will need to go to the hospital for heparin on Saturday.  If INR is >3.0 on Friday, he can wait until    to go to the hospital for heparin.           OBJECTIVE    INR Protime   Date Value Ref Range Status   2019 2.0 (A) 0.86 - 1.14 Final       ASSESSMENT / PLAN  INR assessment SUB    Recheck INR In: 3 DAYS    INR Location Clinic      Anticoagulation Summary  As of 2019    INR goal:   2.5-3.5   TTR:   57.9 % (3.5 y)   INR used for dosin.0! (2019)   Warfarin maintenance plan:   5 mg (5 mg x 1) every Mon, Wed, Fri; 7.5  mg (5 mg x 1.5) all other days   Full warfarin instructions:   11/5: 10 mg; 11/6: 7.5 mg; Otherwise 5 mg every Mon, Wed, Fri; 7.5 mg all other days   Weekly warfarin total:   45 mg   Plan last modified:   Caryn Campos RN (6/25/2019)   Next INR check:   11/8/2019   Priority:   INR   Target end date:   Indefinite    Indications    AF (atrial fibrillation) (H) [I48.91]  S/P mitral valve replacement [Z95.2]  Long term current use of anticoagulant therapy [Z79.01]             Anticoagulation Episode Summary     INR check location:       Preferred lab:       Send INR reminders to:   SHANNA DOWELL    Comments:   5mg tabs - andrade dose // transfer from [facility] // [facility] // CALENDAR // right knee replaced 7/22/19      Anticoagulation Care Providers     Provider Role Specialty Phone number    Miguel AJodi rubio Mai, MD  Internal Medicine [phone]            See the Encounter Report to view Anticoagulation Flowsheet and Dosing Calendar (Go to Encounters tab in chart review, and find the Anticoagulation Therapy Visit)    INR is subtherapeutic today.   Patient will take 10 mg today and 7.5 mg tomorrow which will increase weekly total   by 11%, then resume maintenance dose.   Follow up in 3 days.     Dosage adjustment made based on physician directed care plan.      Caryn Campos RN

## 2019-11-08 ENCOUNTER — ANTICOAGULATION THERAPY VISIT (OUTPATIENT)
Dept: NURSING | Facility: CLINIC | Age: 79
End: 2019-11-08
Payer: MEDICARE

## 2019-11-08 DIAGNOSIS — Z79.01 LONG TERM CURRENT USE OF ANTICOAGULANT THERAPY: Primary | ICD-10-CM

## 2019-11-08 DIAGNOSIS — I48.91 AF (ATRIAL FIBRILLATION) (H): ICD-10-CM

## 2019-11-08 DIAGNOSIS — Z95.2 S/P MITRAL VALVE REPLACEMENT: ICD-10-CM

## 2019-11-08 LAB — INR POINT OF CARE: 3.2 (ref 0.86–1.14)

## 2019-11-08 PROCEDURE — 36416 COLLJ CAPILLARY BLOOD SPEC: CPT

## 2019-11-08 PROCEDURE — 85610 PROTHROMBIN TIME: CPT | Mod: QW

## 2019-11-08 NOTE — PROGRESS NOTES
ANTICOAGULATION FOLLOW-UP CLINIC VISIT    Patient Name:  Fausto Farr  Date:  11/8/2019  Contact Type:  Face to Face    SUBJECTIVE:  Patient Findings     Positives:   Upcoming invasive procedure    Comments:   He is scheduled for surgery on 11/12/19.  RIGHT GASTRONEMIUS FLAP TO RIGHT KNEE,   SPLIT THICKNESS SKIN GRAFT FROM   RIGHT THIGH TO RIGHT KNEE     Patient denies: any missed doses,   increased intake of green vegetables,   medication changes, bleeding/bruising,   or signs/symptoms of a clot.    Note from pre-op done on 10/25/19:  Cardiology is recommending admission 2 days prior to surgery for IV heparin,   instead of lovenox. Dr. Santo (p ).    We will stop his coumadin the day before his admission for IV heparin.    If INR is 2.0-2.5 or less on Friday,   he will need to go to the hospital for heparin on Saturday.  If INR is >3.0 on Friday, he can wait until Sunday   to go to the hospital for heparin.    INR is therapeutic today at 3.2.   Patient will take warfarin dose tonight and then hold starting tomorrow.  He will report to the hospital on Sunday for INR check and start IV heparin.  Follow up with INR Clinic in 2 weeks.          Clinical Outcomes     Comments:   He is scheduled for surgery on 11/12/19.  RIGHT GASTRONEMIUS FLAP TO RIGHT KNEE,   SPLIT THICKNESS SKIN GRAFT FROM   RIGHT THIGH TO RIGHT KNEE     Patient denies: any missed doses,   increased intake of green vegetables,   medication changes, bleeding/bruising,   or signs/symptoms of a clot.    Note from pre-op done on 10/25/19:  Cardiology is recommending admission 2 days prior to surgery for IV heparin,   instead of lovenox. Dr. Santo (p ).    We will stop his coumadin the day before his admission for IV heparin.    If INR is 2.0-2.5 or less on Friday,   he will need to go to the hospital for heparin on Saturday.  If INR is >3.0 on Friday, he can wait until Sunday   to go to the hospital for heparin.    INR is  therapeutic today at 3.2.   Patient will take warfarin dose tonight and then hold starting tomorrow.  He will report to the hospital on Sunday for INR check and start IV heparin.  Follow up with INR Clinic in 2 weeks.             OBJECTIVE    INR Protime   Date Value Ref Range Status   11/08/2019 3.2 (A) 0.86 - 1.14 Final       ASSESSMENT / PLAN  INR assessment THER    Recheck INR In: 2 WEEKS    INR Location Clinic      Anticoagulation Summary  As of 11/8/2019    INR goal:   2.5-3.5   TTR:   57.9 % (3.5 y)   INR used for dosing:   3.2 (11/8/2019)   Warfarin maintenance plan:   5 mg (5 mg x 1) every Mon, Wed, Fri; 7.5 mg (5 mg x 1.5) all other days   Full warfarin instructions:   11/9: Hold; 11/10: Hold; 11/11: Hold; Otherwise 5 mg every Mon, Wed, Fri; 7.5 mg all other days   Weekly warfarin total:   45 mg   Plan last modified:   Caryn Campos RN (6/25/2019)   Next INR check:   11/25/2019   Priority:   INR   Target end date:   Indefinite    Indications    AF (atrial fibrillation) (H) [I48.91]  S/P mitral valve replacement [Z95.2]  Long term current use of anticoagulant therapy [Z79.01]             Anticoagulation Episode Summary     INR check location:       Preferred lab:       Send INR reminders to:   SHANNA DOWELL    Comments:   5mg tabs - andrade dose // transfer from [facility] // [employer] // CALENDAR // right knee replaced 7/22/19      Anticoagulation Care Providers     Provider Role Specialty Phone number    Jodi Flower Mai, MD  Internal Medicine [phone]            See the Encounter Report to view Anticoagulation Flowsheet and Dosing Calendar (Go to Encounters tab in chart review, and find the Anticoagulation Therapy Visit)    INR is therapeutic today at 3.2.   Patient will take warfarin dose tonight and then hold starting tomorrow.  He will report to the hospital on Sunday for INR check and start IV heparin.  Follow up with INR Clinic in 2 weeks.        Caryn Campos RN

## 2019-11-08 NOTE — PROGRESS NOTES
INR   Date Value Ref Range Status   2019 2.31 (H) 0.86 - 1.14 Final     INR Protime   Date Value Ref Range Status   2019 3.2 (A) 0.86 - 1.14 Final     Current dosin mg (5 mg x 1) every Mon, Wed, Fri; 7.5 mg (5 mg x 1.5) all other days     Called Dr. Martinez, she notes his INR should be 2.0-2.5, preferably closer to 2.5 range.       Plan: HOLD coumadin tonight and beyond. Repeat INR  upon admission.  If <3 admit  for surgery Tuesday.     Called patient to verify the plan. Verbalized understanding.   Called Dr. Martinez to verify the plan. She will reach out to patient to verify.

## 2019-11-10 ENCOUNTER — HOSPITAL ENCOUNTER (INPATIENT)
Facility: CLINIC | Age: 79
LOS: 9 days | Discharge: HOME HEALTH | DRG: 904 | End: 2019-11-19
Attending: PLASTIC SURGERY | Admitting: PLASTIC SURGERY
Payer: MEDICARE

## 2019-11-10 DIAGNOSIS — I44.1 AV BLOCK, MOBITZ 2: Primary | ICD-10-CM

## 2019-11-10 DIAGNOSIS — K59.01 SLOW TRANSIT CONSTIPATION: ICD-10-CM

## 2019-11-10 DIAGNOSIS — S81.001A OPEN WOUND OF RIGHT KNEE, INITIAL ENCOUNTER: ICD-10-CM

## 2019-11-10 DIAGNOSIS — R00.1 BRADYCARDIA: ICD-10-CM

## 2019-11-10 LAB
ANION GAP SERPL CALCULATED.3IONS-SCNC: 5 MMOL/L (ref 3–14)
BUN SERPL-MCNC: 17 MG/DL (ref 7–30)
CALCIUM SERPL-MCNC: 8.8 MG/DL (ref 8.5–10.1)
CHLORIDE SERPL-SCNC: 104 MMOL/L (ref 94–109)
CO2 SERPL-SCNC: 28 MMOL/L (ref 20–32)
CREAT SERPL-MCNC: 0.94 MG/DL (ref 0.66–1.25)
ERYTHROCYTE [DISTWIDTH] IN BLOOD BY AUTOMATED COUNT: 13.9 % (ref 10–15)
GFR SERPL CREATININE-BSD FRML MDRD: 77 ML/MIN/{1.73_M2}
GLUCOSE SERPL-MCNC: 93 MG/DL (ref 70–99)
HCT VFR BLD AUTO: 35 % (ref 40–53)
HGB BLD-MCNC: 11.2 G/DL (ref 13.3–17.7)
INR PPP: 1.65 (ref 0.86–1.14)
LMWH PPP CHRO-ACNC: 0.19 IU/ML
MCH RBC QN AUTO: 28.4 PG (ref 26.5–33)
MCHC RBC AUTO-ENTMCNC: 32 G/DL (ref 31.5–36.5)
MCV RBC AUTO: 89 FL (ref 78–100)
PLATELET # BLD AUTO: 328 10E9/L (ref 150–450)
POTASSIUM SERPL-SCNC: 3.9 MMOL/L (ref 3.4–5.3)
RBC # BLD AUTO: 3.95 10E12/L (ref 4.4–5.9)
SODIUM SERPL-SCNC: 137 MMOL/L (ref 133–144)
WBC # BLD AUTO: 9.3 10E9/L (ref 4–11)

## 2019-11-10 PROCEDURE — 12000000 ZZH R&B MED SURG/OB

## 2019-11-10 PROCEDURE — 85610 PROTHROMBIN TIME: CPT | Performed by: PLASTIC SURGERY

## 2019-11-10 PROCEDURE — 80048 BASIC METABOLIC PNL TOTAL CA: CPT | Performed by: PLASTIC SURGERY

## 2019-11-10 PROCEDURE — 25000132 ZZH RX MED GY IP 250 OP 250 PS 637: Mod: GY | Performed by: PLASTIC SURGERY

## 2019-11-10 PROCEDURE — 25000132 ZZH RX MED GY IP 250 OP 250 PS 637: Mod: GY

## 2019-11-10 PROCEDURE — 85027 COMPLETE CBC AUTOMATED: CPT | Performed by: PLASTIC SURGERY

## 2019-11-10 PROCEDURE — 99222 1ST HOSP IP/OBS MODERATE 55: CPT | Performed by: PHYSICIAN ASSISTANT

## 2019-11-10 PROCEDURE — 36415 COLL VENOUS BLD VENIPUNCTURE: CPT | Performed by: PLASTIC SURGERY

## 2019-11-10 PROCEDURE — 85520 HEPARIN ASSAY: CPT | Performed by: PLASTIC SURGERY

## 2019-11-10 PROCEDURE — 99207 ZZC CONSULT E&M CHANGED TO INITIAL LEVEL: CPT | Performed by: PHYSICIAN ASSISTANT

## 2019-11-10 PROCEDURE — 25000128 H RX IP 250 OP 636

## 2019-11-10 RX ORDER — HYDROCODONE BITARTRATE AND ACETAMINOPHEN 5; 325 MG/1; MG/1
1-2 TABLET ORAL EVERY 4 HOURS PRN
Status: DISCONTINUED | OUTPATIENT
Start: 2019-11-10 | End: 2019-11-19 | Stop reason: HOSPADM

## 2019-11-10 RX ORDER — HEPARIN SODIUM 10000 [USP'U]/100ML
1200 INJECTION, SOLUTION INTRAVENOUS CONTINUOUS
Status: DISCONTINUED | OUTPATIENT
Start: 2019-11-10 | End: 2019-11-12

## 2019-11-10 RX ORDER — PROCHLORPERAZINE 25 MG
12.5 SUPPOSITORY, RECTAL RECTAL EVERY 12 HOURS PRN
Status: DISCONTINUED | OUTPATIENT
Start: 2019-11-10 | End: 2019-11-19 | Stop reason: HOSPADM

## 2019-11-10 RX ORDER — EZETIMIBE 10 MG/1
10 TABLET ORAL EVERY EVENING
Status: DISCONTINUED | OUTPATIENT
Start: 2019-11-10 | End: 2019-11-19 | Stop reason: HOSPADM

## 2019-11-10 RX ORDER — ONDANSETRON 2 MG/ML
4 INJECTION INTRAMUSCULAR; INTRAVENOUS EVERY 6 HOURS PRN
Status: DISCONTINUED | OUTPATIENT
Start: 2019-11-10 | End: 2019-11-19 | Stop reason: HOSPADM

## 2019-11-10 RX ORDER — SODIUM PHOSPHATE,MONO-DIBASIC 19G-7G/118
1 ENEMA (ML) RECTAL EVERY EVENING
COMMUNITY

## 2019-11-10 RX ORDER — HEPARIN SODIUM 5000 [USP'U]/.5ML
5000 INJECTION, SOLUTION INTRAVENOUS; SUBCUTANEOUS EVERY 12 HOURS
Status: DISCONTINUED | OUTPATIENT
Start: 2019-11-10 | End: 2019-11-10

## 2019-11-10 RX ORDER — FERROUS SULFATE 325(65) MG
325 TABLET ORAL
Status: DISCONTINUED | OUTPATIENT
Start: 2019-11-11 | End: 2019-11-19 | Stop reason: HOSPADM

## 2019-11-10 RX ORDER — TAMSULOSIN HYDROCHLORIDE 0.4 MG/1
0.4 CAPSULE ORAL EVERY EVENING
Status: DISCONTINUED | OUTPATIENT
Start: 2019-11-10 | End: 2019-11-19 | Stop reason: HOSPADM

## 2019-11-10 RX ORDER — CEFADROXIL 500 MG/1
500 CAPSULE ORAL EVERY 12 HOURS SCHEDULED
Status: DISCONTINUED | OUTPATIENT
Start: 2019-11-10 | End: 2019-11-10

## 2019-11-10 RX ORDER — FERROUS SULFATE 325(65) MG
325 TABLET ORAL
COMMUNITY

## 2019-11-10 RX ORDER — ACETAMINOPHEN 325 MG/1
325-650 TABLET ORAL EVERY 6 HOURS PRN
Status: DISCONTINUED | OUTPATIENT
Start: 2019-11-10 | End: 2019-11-10

## 2019-11-10 RX ORDER — ONDANSETRON 4 MG/1
4 TABLET, ORALLY DISINTEGRATING ORAL EVERY 6 HOURS PRN
Status: DISCONTINUED | OUTPATIENT
Start: 2019-11-10 | End: 2019-11-19 | Stop reason: HOSPADM

## 2019-11-10 RX ORDER — ACETAMINOPHEN 325 MG/1
650 TABLET ORAL EVERY 4 HOURS PRN
Status: DISCONTINUED | OUTPATIENT
Start: 2019-11-10 | End: 2019-11-19 | Stop reason: HOSPADM

## 2019-11-10 RX ORDER — FUROSEMIDE 20 MG
20 TABLET ORAL DAILY
Status: DISCONTINUED | OUTPATIENT
Start: 2019-11-11 | End: 2019-11-12

## 2019-11-10 RX ORDER — PROCHLORPERAZINE MALEATE 5 MG
5 TABLET ORAL EVERY 6 HOURS PRN
Status: DISCONTINUED | OUTPATIENT
Start: 2019-11-10 | End: 2019-11-19 | Stop reason: HOSPADM

## 2019-11-10 RX ORDER — ROSUVASTATIN CALCIUM 20 MG/1
40 TABLET, COATED ORAL EVERY EVENING
Status: DISCONTINUED | OUTPATIENT
Start: 2019-11-10 | End: 2019-11-19 | Stop reason: HOSPADM

## 2019-11-10 RX ORDER — MESALAMINE 800 MG/1
800 TABLET, DELAYED RELEASE ORAL 2 TIMES DAILY
Status: DISCONTINUED | OUTPATIENT
Start: 2019-11-10 | End: 2019-11-19 | Stop reason: HOSPADM

## 2019-11-10 RX ORDER — NALOXONE HYDROCHLORIDE 0.4 MG/ML
.1-.4 INJECTION, SOLUTION INTRAMUSCULAR; INTRAVENOUS; SUBCUTANEOUS
Status: DISCONTINUED | OUTPATIENT
Start: 2019-11-10 | End: 2019-11-19 | Stop reason: HOSPADM

## 2019-11-10 RX ORDER — METOPROLOL TARTRATE 25 MG/1
25 TABLET, FILM COATED ORAL 2 TIMES DAILY
Status: DISCONTINUED | OUTPATIENT
Start: 2019-11-10 | End: 2019-11-17

## 2019-11-10 RX ORDER — LIDOCAINE 40 MG/G
CREAM TOPICAL
Status: DISCONTINUED | OUTPATIENT
Start: 2019-11-10 | End: 2019-11-12

## 2019-11-10 RX ADMIN — ROSUVASTATIN CALCIUM 40 MG: 20 TABLET, FILM COATED ORAL at 20:51

## 2019-11-10 RX ADMIN — HEPARIN SODIUM 900 UNITS/HR: 10000 INJECTION, SOLUTION INTRAVENOUS at 16:20

## 2019-11-10 RX ADMIN — EZETIMIBE 10 MG: 10 TABLET ORAL at 20:51

## 2019-11-10 RX ADMIN — Medication 4000 UNITS: at 16:20

## 2019-11-10 RX ADMIN — MESALAMINE 800 MG: 800 TABLET, DELAYED RELEASE ORAL at 20:51

## 2019-11-10 RX ADMIN — HYDROCODONE BITARTRATE AND ACETAMINOPHEN 2 TABLET: 5; 325 TABLET ORAL at 15:30

## 2019-11-10 RX ADMIN — CEPHALEXIN 250 MG: 250 CAPSULE ORAL at 23:35

## 2019-11-10 RX ADMIN — CEPHALEXIN 250 MG: 250 CAPSULE ORAL at 18:45

## 2019-11-10 RX ADMIN — TAMSULOSIN HYDROCHLORIDE 0.4 MG: 0.4 CAPSULE ORAL at 20:51

## 2019-11-10 RX ADMIN — METOPROLOL TARTRATE 25 MG: 25 TABLET ORAL at 20:51

## 2019-11-10 RX ADMIN — HYDROCODONE BITARTRATE AND ACETAMINOPHEN 2 TABLET: 5; 325 TABLET ORAL at 20:51

## 2019-11-10 ASSESSMENT — ACTIVITIES OF DAILY LIVING (ADL)
TOILETING: 0-->INDEPENDENT
ADLS_ACUITY_SCORE: 13
TRANSFERRING: 0-->INDEPENDENT
SWALLOWING: 0-->SWALLOWS FOODS/LIQUIDS WITHOUT DIFFICULTY
FALL_HISTORY_WITHIN_LAST_SIX_MONTHS: NO
RETIRED_EATING: 0-->INDEPENDENT
RETIRED_COMMUNICATION: 0-->UNDERSTANDS/COMMUNICATES WITHOUT DIFFICULTY
COGNITION: 0 - NO COGNITION ISSUES REPORTED
DRESS: 0-->INDEPENDENT
BATHING: 0-->INDEPENDENT
WHICH_OF_THE_ABOVE_FUNCTIONAL_RISKS_HAD_A_RECENT_ONSET_OR_CHANGE?: AMBULATION
AMBULATION: 1-->ASSISTIVE EQUIPMENT
ADLS_ACUITY_SCORE: 13

## 2019-11-10 NOTE — LETTER
Transition Communication Hand-off for Care Transitions to Next Level of Care Provider    Name: Fausto Farr  : 1940  MRN #: [id number]  Primary Care Provider: Chris Flower     Primary Clinic: [address] [place] [address] [address] [place] [address]  Primary Care Clinic Name: [facility]  Reason for Hospitalization:  open wound   Open wound of right knee  Admit Date/Time: 11/10/2019  1:52 PM  Discharge Date: 2019  Payor Source: Payor: MEDICARE / Plan: MEDICARE / Product Type: Medicare /     Readmission Assessment Measure (ROGER) Risk Score/category: Average    Reason for Communication Hand-off Referral: Other informational    Discharge Plan: home today with [facility] RN for wound cares, drain cares, INR draws. Next INR draw on .   Message left at INR clinic regarding HHC services. Home also with a 30 day event monitor.       Concern for non-adherence with plan of care:   Y/N No  Discharge Needs Assessment:  Needs      Most Recent Value   Equipment Currently Used at Home  cane, straight [also has wh walker]   # of Referrals Placed by CTS  Homecare, Scheduled Follow-up appointments, Communication hand-offs to next level of Care Providers          Follow-up specialty is recommended: Yes    Follow-up plan:    Future Appointments   Date Time Provider Department Center   2019  9:30 AM [facility]    2019 10:30 AM Reba Escobar APRN CNP [facility]   2019  1:10 PM [facility]   1/10/2020 10:30 AM Isrrael Dobbins MD [facility] SLEEP   2020  9:05 AM Chris Flower Mai, MD [facility]       Any outstanding tests or procedures:        Radiology & Cardiology Orders     Future Labs/Procedures Complete By Expires    Cardiac Event Monitor Adult Pediatric  As directed     Comments:    Bradycardia/SSS.        Referrals     Future Labs/Procedures    Discharge Order: F/U with Cardiac  AURELIA     Home care nursing referral      Comments:    RN extended hours visit. RN to assess incision for signs/symptoms of infection, drain care, wound care as ordered.  Please draw INR on Friday, November 22nd, [facility] Kamlesh monitors coumadin. Start of Care Wednesday, November 20th.    You are being discharged with home care services through [facility]. [facility] will contact you to schedule initial visit. If you have any questions prior to this call, please contact the 24 hour patient line at [phone].    Your provider has ordered home care nursing services. If you have not been contacted within 2 days of your discharge please call the inpatient department phone number at [phone] .            Key Recommendations:  home today with  HHC RN for wound cares, drain cares, INR draws. Next INR draw on Friday, November 22nd.   Message left at INR clinic regarding HHC services. Home also with a 30 day event monitor.      Johanny Agrawal RN    AVS/Discharge Summary is the source of truth; this is a helpful guide for improved communication of patient story

## 2019-11-10 NOTE — CONSULTS
Consult Date:  11/10/2019      PRIMARY CARE PROVIDER:  Jodi Flower MD.       REASON FOR CONSULTATION:  Medical management.      PHYSICIAN REQUESTING CONSULTATION:  Dr. Martinez.        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  Patient unfortunately has developed an open wound over the distal aspect of his incision with multiple modalities attempted for wound closure, however, remained unsuccessful.  Thus, in consultation with Plastics, patient has been recommended to undergo a flap and skin graft in the operating room.  Given patient's underlying cardiac comorbidities and requiring the use of chronic anticoagulation, the patient was recommended to be admitted to the hospital for heparin bridging in anticipation of OR.  Patient subsequently presented today for admission.  Hospitalist Service was contacted for co-medical management.      The patient presently is evaluated in hospital room with family members at bedside.  He understands reasoning for admission, does have some questions regarding his warfarin and heparin and indications for each.  He has been off of his warfarin for the past 2 days.  INR on admission is 1.65.  Remainder of laboratory studies are unremarkable.  The patient reports that within the last week he has been in his otherwise normal state of health.  Specifically, denies any fevers, chills, cough, congestion, difficulty breathing, change in bowel or bladder.  Multiple questions regarding weightbearing status and mobility following surgery.      PAST MEDICAL HISTORY:   1.  Obstructive sleep apnea, uses CPAP.   2.  Coronary artery disease, status post CABG x2.  The patient had a stress imaging  performed in 04/2019, which was negative for inducible ischemia.   3.  Mechanical aortic and mitral valves on chronic Coumadin therapy with a goal INR of 2.5 to 3.5.   4.  Atrial fibrillation and flutter, status post ablation.   5.  History of AAA followed by Vascular Surgery yearly.   6.  Ulcerative colitis, on mesalamine chronically.   7.  Heart failure with preserved ejection fraction.   8.  Hypertension.   9.  Hyperlipidemia.      PAST SURGICAL HISTORY:   1.  Varicose vein stripping in 1968 and 1993.   2.  Vocal cord polypectomy.   3.  Aortic valve replacement as well as mitral valve replacement, mechanical, in 2013.   4.  AAA repair.   5.  Prostate surgery with radioactive seeding in 2008.   6.  Lumbar spinal fusion.   7.  Carpal tunnel release bilaterally.   8.  Cardiac catheterizations, multiple, CABG as noted above.   9.  Right total knee arthroplasty in 07/2019 with subsequent I and D in 08/2019.      PRIOR TO ADMISSION MEDICATIONS:    Prior to Admission medications    Medication Sig Last Dose Taking? Auth Provider   acetaminophen (TYLENOL) 325 MG tablet Take 1-2 tablets (325-650 mg) by mouth every 6 hours as needed for mild pain prn med Yes Doctor, None, MD   betamethasone valerate (VALISONE) 0.1 % external lotion Apply topically 2 times daily Apply topically to scalp twice daily PRN prn med Yes Jodi Flower Mai, MD   cefadroxil (DURICEF) 500 MG capsule TK 1 C PO BID 11/10/2019 at am Yes Reported, Patient   cholecalciferol 25 MCG (1000 UT) TABS Take 1,000 Units by mouth daily 11/10/2019 at Unknown time Yes Unknown, Entered By History   ezetimibe (ZETIA) 10 MG tablet Take 1 tablet (10 mg) by mouth daily 11/9/2019 at pm Yes Magdiel Dumas MD   ferrous sulfate (FEROSUL) 325 (65 Fe) MG tablet Take 325 mg by mouth daily (with breakfast) 11/9/2019 at pm Yes Unknown, Entered By History   fluocinonide (LIDEX) 0.05 % external ointment Apply topically 2 times daily as needed prn med Yes Reported, Patient    furosemide (LASIX) 40 MG tablet Take 0.5 tablets (20 mg) by mouth daily 11/10/2019 at Unknown time Yes Tu Reyes MD   glucosamine-chondroitin 500-400 MG CAPS per capsule Take 1 capsule by mouth 2 times daily 11/10/2019 at am Yes Unknown, Entered By History   mesalamine (ASACOL HD) 800 MG EC tablet Take 1 tablet (800 mg) by mouth 2 times daily 11/10/2019 at am Yes Tu Reyes MD   metoprolol tartrate (LOPRESSOR) 25 MG tablet Take 1 tablet (25 mg) by mouth 2 times daily 11/10/2019 at am Yes Tu Reyes MD   rosuvastatin (CRESTOR) 40 MG tablet Take 1 tablet (40 mg) by mouth daily 11/9/2019 at pm Yes Magdiel Dumas MD   tamsulosin (FLOMAX) 0.4 MG capsule TAKE 1 CAPSULE BY MOUTH EVERY DAY 11/9/2019 at pm Yes Tu Reyes MD   warfarin ANTICOAGULANT (COUMADIN) 5 MG tablet Take 5 mg (1 tablet) every Mon, Wed, Fri; 7.5 mg (1.5 tablets) all other days or as directed by INR Clinic 11/7/2019 Yes Tu Reyes MD           ALLERGIES:  No known drug allergies.  DOES HAVE ALLERGIC REACTION TO BEES CAUSING ANAPHYLAXIS.      FAMILY HISTORY:  Father with a history of coronary artery disease and requiring bypass as well as pacemaker placement.      SOCIAL HISTORY:  The patient is a former smoker with a 40-pack-year history.  Consumes alcohol on a social basis.      REVIEW OF SYSTEMS:  A 10-point review of systems was performed and is otherwise negative.  Please refer to the HPI.      PHYSICAL EXAMINATION:   VITAL SIGNS:  Temperature of 98 degrees, heart rate 58, respiratory rate of 18, blood pressure 110/52, SpO2 98% on room air.   GENERAL:  Well-developed, well-nourished male who clinically appears comfortable.   HEENT:  Head is normocephalic.  Pupils are equal, round and reactive to light bilaterally.  EOMs are intact bilaterally.  Conjunctivae and sclerae are clear, not injected bilaterally.  Nose, mouth are patent.  Mucous membranes are moist.   LUNGS:  Clear to auscultation bilaterally  without wheeze or crackles.   CARDIOVASCULAR:  Regular rate and rhythm, normal S1 and S2, positive mitral click appreciated.  No murmur.   ABDOMEN:  Soft, nontender, nondistended, no guarding or rigidity.   EXTREMITIES:  The patient is spontaneously moving bilateral upper and lower extremities.  Right knee is with dressing in place overlying anterior aspect.  There is no surrounding erythema or warmth.  Knee is slightly more edematous in comparison to the left with nonpitting edema.   SKIN:  Warm and dry without rash.      LABORATORY AND IMAGING:  As reviewed in Epic and as in HPI.      ASSESSMENT AND PLAN:  Fausto Farr is a 79-year-old gentleman with past medical history of coronary artery disease status post CABG x2, mechanical aortic and mitral valves on chronic anticoagulation with Coumadin, ulcerative colitis, hypertension, hyperlipidemia who has had a delayed healing with persistent wound and tendon visible following a total knee arthroplasty in July.  The patient is recommended to proceed with grafting to wound on Tuesday 11/12/2019 per Plastic Surgery.  He is admitted under inpatient status for assistance with heparin bridging.  Hospitalist Service will follow.   1.  Chronic wound of right anterior knee following total knee arthroplasty in 07/2019 with a visible tendon.  The patient is scheduled to undergo surgical flap with skin graft on Tuesday 11/12/2019 per Plastic Surgery.  Will defer mobility as well as wound care recommendations to the Surgical Service at this time.  Postoperatively, pain management as per the primary service.   2.  History of mechanical aortic and mitral valves, historically on chronic anticoagulation with Coumadin with a goal INR of 2.5 to 3.5.  The patient has been off of his Coumadin since Friday, 11/08/2019 in anticipation of needing bridging for surgery.  INR on admission is 1.65.  Pharmacy has been was consulted to assist with heparin dosing.  The patient will require IV  continuous infusion of heparin and will need to be held approximately 4 hours prior to surgery and resumed as soon as able following.  Postoperatively, to resume Coumadin as soon as able per Surgical service.   3.  History of atrial fibrillation and atrial flutter, status post ablation.  The patient will continue on prior to admission metoprolol.   4.  Hypertension, hyperlipidemia with history of coronary artery disease, status post CABG.  The patient with a stress imaging in 2019, which was negative for active ischemia.  Currently, he will continue on prior to admission Lasix, metoprolol, statin and ezetimibe.   5.  Benign prostatic hypertrophy.  Continue prior to admission tamsulosin.   6.  Deep venous thrombosis prophylaxis.  Heparin drip as noted above.      CODE STATUS:  The patient is a full code.      This patient was staffed with Dr. Tr Jacobsen who independently interviewed and evaluated patient and is agreement with the above mentioned plan.  We, the Hospitalist Service, thank you for this consultation.  We will continue to follow along with you closely.  Please do not hesitate to call with acute concerns or questions.         TR JACOBSEN MD       As dictated by CHATO BARRON PA-C            D: 11/10/2019   T: 11/10/2019   MT: WT      Name:     LIANG VUAGHN   MRN:      [id number]        Account:       [id number]   :      1940           Consult Date:  11/10/2019      Document: [id number]

## 2019-11-10 NOTE — PHARMACY-ADMISSION MEDICATION HISTORY
Admission medication history interview status for the 11/10/2019  admission is complete. See EPIC admission navigator for prior to admission medications     Medication history source reliability:Good    Actions taken by pharmacist (provider contacted, etc): spoke to pt     Additional medication history information not noted on PTA med list :None    Medication reconciliation/reorder completed by provider prior to medication history? Yes    Time spent in this activity: 10 minutes    Prior to Admission medications    Medication Sig Last Dose Taking? Auth Provider   acetaminophen (TYLENOL) 325 MG tablet Take 1-2 tablets (325-650 mg) by mouth every 6 hours as needed for mild pain prn med Yes Doctor, None, MD   betamethasone valerate (VALISONE) 0.1 % external lotion Apply topically 2 times daily Apply topically to scalp twice daily PRN prn med Yes Jodi Flower Mai, MD   cefadroxil (DURICEF) 500 MG capsule TK 1 C PO BID 11/10/2019 at am Yes Reported, Patient   cholecalciferol 25 MCG (1000 UT) TABS Take 1,000 Units by mouth daily 11/10/2019 at Unknown time Yes Unknown, Entered By History   ezetimibe (ZETIA) 10 MG tablet Take 1 tablet (10 mg) by mouth daily 11/9/2019 at pm Yes Magdiel Dumas MD   ferrous sulfate (FEROSUL) 325 (65 Fe) MG tablet Take 325 mg by mouth daily (with breakfast) 11/9/2019 at pm Yes Unknown, Entered By History   fluocinonide (LIDEX) 0.05 % external ointment Apply topically 2 times daily as needed prn med Yes Reported, Patient   furosemide (LASIX) 40 MG tablet Take 0.5 tablets (20 mg) by mouth daily 11/10/2019 at Unknown time Yes Tu Reyes MD   glucosamine-chondroitin 500-400 MG CAPS per capsule Take 1 capsule by mouth 2 times daily 11/10/2019 at am Yes Unknown, Entered By History   mesalamine (ASACOL HD) 800 MG EC tablet Take 1 tablet (800 mg) by mouth 2 times daily 11/10/2019 at am Yes Tu Reyes MD   metoprolol tartrate (LOPRESSOR) 25 MG tablet Take 1 tablet (25 mg) by mouth 2  times daily 11/10/2019 at am Yes Tu Reyes MD   rosuvastatin (CRESTOR) 40 MG tablet Take 1 tablet (40 mg) by mouth daily 11/9/2019 at pm Yes Magdiel Dumas MD   tamsulosin (FLOMAX) 0.4 MG capsule TAKE 1 CAPSULE BY MOUTH EVERY DAY 11/9/2019 at pm Yes Tu Reyes MD   warfarin ANTICOAGULANT (COUMADIN) 5 MG tablet Take 5 mg (1 tablet) every Mon, Wed, Fri; 7.5 mg (1.5 tablets) all other days or as directed by INR Clinic 11/7/2019 Yes Tu Reyes MD Beth Mulder, PharmD

## 2019-11-10 NOTE — H&P
Plastic Surgery Admit Note    HPI: 79 year old male to have been admitted to hospitalist service for heparin bridging prior to surgery on Tuesday. Patient underwent total knee replacement in July and developed an open wound over the distal aspect of his incision. Multiple modalities tried for wound closure but all unsuccessful as wound is primarily patellar tendon. Patient will need rotational gastroc flap and skin graft on Tuesday.    Patient with multiple medical issues, most significant is  aortic and mitral valves. Patient maintained on coumadin with an INR of 3.0 - 3.5. Per his cardiologist, Dr. Santo,coumadin held on Friday and he presents today to begin heparin with goal INR of < 2.0 on Tuesday.    Past Medical History:   Diagnosis Date     Abdominal pain      Abnormal ECG     RBBB, 1st degree AVB, Left axis deviation     Anemia     currently taking iron     Arrhythmia     pac, pvc     Back pain     since 1980     BPH (benign prostatic hyperplasia)      Bruit      CAD (coronary artery disease)      Cellulitis 10/18/12     Cellulitis 05/2018    GrpB strep LLE cellulitis  negative RACHAEL for veg     Chronic venous insufficiency     bilat lower extremities     Contact dermatitis and other eczema, due to unspecified cause      Diaphragmatic hernia without mention of obstruction or gangrene      Diastolic HF (heart failure) (H)      Gastric ulcer      Glucose intolerance (impaired glucose tolerance)      Heart murmur 9/16/13    valvular heart disease     Hyperlipidemia LDL goal <100 8/6/2013     Hypertension 8/6/13     Lumbago      Malaise and fatigue      Metabolic syndrome      Mobitz (type) I (Wenckebach's) atrioventricular block     and RBBB     Nocturia 10/18/12     Nonallopathic lesion of cervical region      Nonallopathic lesion of lumbar region      Nonallopathic lesion of pelvic region, not elsewhere classified      Nonallopathic lesion of rib cage      Nonallopathic lesion of sacral region       GAGE (obstructive sleep apnea)     CPAP     Paroxysmal atrial fibrillation (H) 10/18/12     Prostate cancer (H) 2008    radiation seed, XRT      PVD (peripheral vascular disease) (H)      RBBB     1st degree AVB, RBBB, LAHB     Rotator cuff strain     and sprain     S/P AAA repair      S/P aortic valve replacement 2006    developed perivalve leak and MS, therefore redo surg 2013     S/P CABG (coronary artery bypass graft) 2006    Lima-Lad, RA-Rca     Sciatica of left side     since 2000     Sepsis due to group B Streptococcus (H) 5/19/2018     Ulcerative colitis (H)      Varicose veins of bilateral lower extremities with other complications     s/p RLE vein stripping     Vitamin D deficiency        Past Surgical History:   Procedure Laterality Date     AORTIC VALVE REPLACEMENT  1/3/06    redo AVR SJM 21mm and SJM MVR 27mm in 2013SJM 21([id number]):AVR, SJM 27 :MVR-     ARTHROPLASTY KNEE      right knee     ARTHROPLASTY KNEE Right 7/22/2019    Procedure: Right total knee arthroplasty;  Surgeon: Prasanth Jensen MD;  Location:  OR     BACK SURGERY  Oct 2015    Fusion L4-5, laminectomy L2, L3     BYPASS GRAFT ARTERY CORONARY  10/2013    reimplantation of radial artery graft to RCA     C CABG, VEIN, TWO  1/3/06    Left radial to RCA, LIMA to LAD (RA to RCA reimplanted at time of 2013 surg)     CARDIAC CATHERIZATION  11/2005    Stent placed to RCA     CARDIAC CATHERIZATION  04/2013    Occluded RCA, patent LIMA to LAD and radial graft to PDA     CARPAL TUNNEL RELEASE RT/LT  1994     COLONOSCOPY  8-22-11     CYSTOSCOPY FLEXIBLE  10/16/2013    Procedure: CYSTOSCOPY FLEXIBLE;  FLEXIBLE CYSTOSCOPY / DILATION OF URETHRA / INSERTION OF LESLIE;  Surgeon: Cooper Wallace MD;  Location: [facility] OR     ENDOVASCULAR REPAIR, INFRARENAL ABDOMINAL AORTIC ANEURYSM/DISSECTION; MODULAR BIFURCATED PROSTHESIS  2006    AAA repair endovascular     ENT SURGERY       EP ABLATION ATRIAL FLUTTER N/A 4/22/2019    Procedure: EP Ablation  Atrial Flutter;  Surgeon: Jessy Vasquez MD;  Location:  [facility] LAB     GENITOURINARY SURGERY  6/16/08    radioactive seeding     HEAD & NECK SURGERY  1997    vocal cord polypectomy     INCISION AND DRAINAGE KNEE, COMBINED Right 8/29/2019    Procedure: INCISION AND DRAINAGE, KNEE W/ Secondary Wound Closure;  Surgeon: Prasanth Jensen MD;  Location: [facility] OR     KNEE SURGERY  2001 Right knee arthroscopy     OPTICAL TRACKING SYSTEM FUSION SPINE POSTERIOR LUMBAR THREE+ LEVELS N/A 10/29/2015    Procedure: OPTICAL TRACKING SYSTEM FUSION SPINE POSTERIOR LUMBAR THREE+ LEVELS;  Surgeon: Walt Garcia MD;  Location:  OR     PROSTATE SURGERY      radioactive seeding 6/16/08     REPAIR ANEURYSM ABDOMINAL AORTA  06/08     REPAIR VALVE MITRAL  10/16/2013    SJM 21([id number] [id number]):AVR, SJM 27 :MVRProcedure: REPAIR VALVE MITRAL;  REDO STERNOTOMY/REDO AORTIC VALVE REPLACEMENT/ MITRAL VALVE REPLACEMENT/REIMPLANTATION OF RIGHT CORONARY ARTERY BYPASS WITH RACHAEL ( ON PUMP);  Surgeon: Viet Singh MD;  Location:  OR     REPLACE VALVE AORTIC  10/16/2013    Procedure: REPLACE VALVE AORTIC;;  Surgeon: Viet Singh MD;  Location:  OR     SURGERY GENERAL IP CONSULT  05/2008 Excision aneurysm abdominal aorta     SURGERY GENERAL IP CONSULT  1997 Vocal cord polypectomy     VASCULAR SURGERY  1968, 1993     varicose vein stripping       Allergies   Allergen Reactions     Bees Anaphylaxis       Medications Prior to Admission   Medication Sig Dispense Refill Last Dose     acetaminophen (TYLENOL) 325 MG tablet Take 1-2 tablets (325-650 mg) by mouth every 6 hours as needed for mild pain 250 tablet 11 8/29/2019 at Unknown time     betamethasone valerate (VALISONE) 0.1 % external lotion Apply topically 2 times daily Apply topically to scalp twice daily PRN 60 mL 3      cefadroxil (DURICEF) 500 MG capsule TK 1 C PO BID  3      ezetimibe (ZETIA) 10 MG tablet Take 1 tablet (10 mg) by mouth daily 90  "tablet 3 8/28/2019 at Unknown time     fluocinonide (LIDEX) 0.05 % external ointment Apply topically 2 times daily as needed        furosemide (LASIX) 40 MG tablet Take 0.5 tablets (20 mg) by mouth daily 45 tablet 3 8/28/2019 at Unknown time     mesalamine (ASACOL HD) 800 MG EC tablet Take 1 tablet (800 mg) by mouth 2 times daily 180 tablet 11 8/28/2019 at Unknown time     metoprolol tartrate (LOPRESSOR) 25 MG tablet Take 1 tablet (25 mg) by mouth 2 times daily 180 tablet 3 8/29/2019 at Unknown time     rosuvastatin (CRESTOR) 40 MG tablet Take 1 tablet (40 mg) by mouth daily 90 tablet 3 8/28/2019 at Unknown time     tamsulosin (FLOMAX) 0.4 MG capsule TAKE 1 CAPSULE BY MOUTH EVERY DAY 90 capsule 3 8/28/2019 at Unknown time     warfarin ANTICOAGULANT (COUMADIN) 5 MG tablet Take 5 mg (1 tablet) every Mon, Wed, Fri; 7.5 mg (1.5 tablets) all other days or as directed by INR Clinic 120 tablet 3        PE:   BP Readings from Last 1 Encounters:   10/28/19 112/67      Pulse Readings from Last 1 Encounters:   10/28/19 75      Resp Readings from Last 1 Encounters:   10/15/19 16      Temp Readings from Last 1 Encounters:   10/25/19 98  F (36.7  C) (Oral)      SpO2 Readings from Last 1 Encounters:   10/28/19 96%      Wt Readings from Last 1 Encounters:   10/28/19 90.7 kg (200 lb)      Ht Readings from Last 1 Encounters:   10/28/19 1.676 m (5' 5.98\")       Patient not seen. Per last clinic visit, exposed patellar tendon right knee.    A/P: Management per Hospital service. Basic labs ordered.     Sara Martinez MD  Plastic Surgery  [facility]  [phone] (office)          "

## 2019-11-11 LAB — LMWH PPP CHRO-ACNC: 0.17 IU/ML

## 2019-11-11 PROCEDURE — 99207 ZZC CDG-MDM COMPONENT: MEETS MODERATE - UP CODED: CPT | Performed by: INTERNAL MEDICINE

## 2019-11-11 PROCEDURE — 25000132 ZZH RX MED GY IP 250 OP 250 PS 637: Mod: GY

## 2019-11-11 PROCEDURE — 25000132 ZZH RX MED GY IP 250 OP 250 PS 637: Mod: GY | Performed by: PLASTIC SURGERY

## 2019-11-11 PROCEDURE — 25000132 ZZH RX MED GY IP 250 OP 250 PS 637: Mod: GY | Performed by: PHYSICIAN ASSISTANT

## 2019-11-11 PROCEDURE — 99232 SBSQ HOSP IP/OBS MODERATE 35: CPT | Performed by: INTERNAL MEDICINE

## 2019-11-11 PROCEDURE — 85520 HEPARIN ASSAY: CPT | Performed by: PLASTIC SURGERY

## 2019-11-11 PROCEDURE — 25000128 H RX IP 250 OP 636: Performed by: PLASTIC SURGERY

## 2019-11-11 PROCEDURE — 12000000 ZZH R&B MED SURG/OB

## 2019-11-11 PROCEDURE — 36415 COLL VENOUS BLD VENIPUNCTURE: CPT | Performed by: PLASTIC SURGERY

## 2019-11-11 RX ADMIN — MESALAMINE 800 MG: 800 TABLET, DELAYED RELEASE ORAL at 08:22

## 2019-11-11 RX ADMIN — METOPROLOL TARTRATE 25 MG: 25 TABLET ORAL at 20:53

## 2019-11-11 RX ADMIN — TAMSULOSIN HYDROCHLORIDE 0.4 MG: 0.4 CAPSULE ORAL at 20:53

## 2019-11-11 RX ADMIN — FUROSEMIDE 20 MG: 20 TABLET ORAL at 08:22

## 2019-11-11 RX ADMIN — CEPHALEXIN 250 MG: 250 CAPSULE ORAL at 17:21

## 2019-11-11 RX ADMIN — EZETIMIBE 10 MG: 10 TABLET ORAL at 20:54

## 2019-11-11 RX ADMIN — HYDROCODONE BITARTRATE AND ACETAMINOPHEN 2 TABLET: 5; 325 TABLET ORAL at 08:22

## 2019-11-11 RX ADMIN — ROSUVASTATIN CALCIUM 40 MG: 20 TABLET, FILM COATED ORAL at 20:53

## 2019-11-11 RX ADMIN — CEPHALEXIN 250 MG: 250 CAPSULE ORAL at 05:49

## 2019-11-11 RX ADMIN — FERROUS SULFATE TAB 325 MG (65 MG ELEMENTAL FE) 325 MG: 325 (65 FE) TAB at 08:22

## 2019-11-11 RX ADMIN — METOPROLOL TARTRATE 25 MG: 25 TABLET ORAL at 08:22

## 2019-11-11 RX ADMIN — MESALAMINE 800 MG: 800 TABLET, DELAYED RELEASE ORAL at 20:53

## 2019-11-11 RX ADMIN — CEPHALEXIN 250 MG: 250 CAPSULE ORAL at 23:22

## 2019-11-11 RX ADMIN — HEPARIN SODIUM 900 UNITS/HR: 10000 INJECTION, SOLUTION INTRAVENOUS at 15:02

## 2019-11-11 RX ADMIN — HYDROCODONE BITARTRATE AND ACETAMINOPHEN 2 TABLET: 5; 325 TABLET ORAL at 20:53

## 2019-11-11 RX ADMIN — CEPHALEXIN 250 MG: 250 CAPSULE ORAL at 11:14

## 2019-11-11 ASSESSMENT — ACTIVITIES OF DAILY LIVING (ADL)
ADLS_ACUITY_SCORE: 14
ADLS_ACUITY_SCORE: 13
ADLS_ACUITY_SCORE: 14
ADLS_ACUITY_SCORE: 14
ADLS_ACUITY_SCORE: 13
ADLS_ACUITY_SCORE: 13

## 2019-11-11 NOTE — PLAN OF CARE
Dx: Open wound right knee. Surg: Recent total knee replacement, plastic surgery to correct open wound on tuesday. VSS ex Bradycardic. A/Ox4. Right knee wound is packed with iodoform gauze and covered with a dry gauze over it, changed on admission, cdi. CMS intact. Pain controlled with prn Norco. Up with SBA. Tolerating regular diet. Denies N&V. +gas, +bm. Voiding adequately. LS clear. Will continue to monitor

## 2019-11-11 NOTE — PLAN OF CARE
A&O x4. VSS on RA. Lungs clear. BS+, BM+, flatus+. Wound covered, CDI . Tolerating regular diet. Denies N/V. AUO. [place] for pain. Up independently. Surgery scheduled for 11/12/19.

## 2019-11-11 NOTE — PLAN OF CARE
Vital signs stable on RA. A/Ox4. R knee dressing packed/changed today per plan of care. CDI. LS clear. BS active. passing gas, no bm today. regular diet, denies n/v. Voiding. Pain controlled with PRN norco. up ad claudette. Baseline numbness and tingling bilateral LE. Pulses present w/ doppler--thready at times, MD informed.     Hep gtt infusing, maintained at 900 units/hr. Recheck in AM. NPO at 0600 for 1500 surgery tomorrow.

## 2019-11-11 NOTE — PROGRESS NOTES
[facility]    Internal Medicine Hospitalist Progress Note  11/11/2019  I evaluated patient on the above date.    Jack Spann Jr., MD  [phone] (p)  Text Page        Assessment & Plan New actions/orders today (11/11/2019) are underlined.    Fausto Farr is a 79-year-old gentleman with past medical history including obesity; ulcerative colitis; CAD; HTN; afib/flutter; and mechanical AVR and MVR on warfarin; who has had a delayed healing with persistent wound and tendon visible following a total knee arthroplasty (7/2019) that requires surgery. Admitted 11/10/2019 for bridging anticoagulation prior to surgery (planned for 11/12).    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    * Had R TKA 7/22/2019. Later suffered wound breakdown, right knee, and underwent I&D and secondary wound closure 8/29/2019. Developed non-healing wound with visible tendon and referred to Plastics. PTA on cefadroxil.  - The patient is scheduled to undergo surgical flap with skin graft on Tuesday 11/12/2019 per Plastic Surgery.    - Continue cephalexin (started 11/10 as formulary alternative to cefadroxil which pt was on PTA).    S/p mechanical aortic and mitral valve replacements.  * Originally had AVR (along with CABG) 2006. Underwent redo sternotomy with mechanical AVR and MVR 2013. Historically on chronic anticoagulation with warfarin with a goal INR of 2.5 to 3.5.   * The patient had been off warfarin since Friday, 11/08/2019 in anticipation of needing bridging for surgery.   * INR 1.65 on admit 11/10. Started on bridging heparin gtt 11/10.  Recent Labs   Lab 11/10/19  1509 11/08/19  0906 11/05/19  1338   INR 1.65* 3.2* 2.0*   - Continue heparin gtt; hold approximately 4 hours prior to surgery and resume as soon as able following when OK with Plastics.  - Restart warfarin when able post-surgery per Plastics.    Anemia, suspect chronic component.  Hgb 11.2 on  admit 11/10. No overt clinical signs of major bleeding.   Recent Labs   Lab 11/10/19  1509   HGB 11.2*   - Monitor CBC.  - Consider prbc transfusion if hgb </= 7.0 or if significant bleeding with hemodynamic instability or if symptomatic.    CAD.  Hypertension (benign essential).  [PTA: furosemide 20 mg daily; metoprolol 25 mg BID.]  * S/p CABG (along with AVR) in 2006. Nuc stress 4/2019 showed no ischemia.  - Continue furosemide, metoprolol, rosuvastatin.  - Monitor i/o's, daily wts.    Atrial fibrillation and atrial flutter, status post ablation (4/2019).  - Continue metoprolol.  - Anticoagulation management as above.     PAD.  H/o AAA stent repair.  - Continue rosuvastatin.    GAGE.  - Continue CPAP.    HLD.  - Continue ezetimibe and rosuvastatin.    Benign prostatic hypertrophy.    - Continue prior to admission tamsulosin.     Diet: Combination Diet Regular Diet Adult    Prophylaxis: PCD's, ambulation. On heparin gtt.  Lord Catheter: not present  Code Status: Full Code      Disposition Plan   Expected discharge: 3-5d, recommended to prior living arrangement once stable from surgical stanspoint and once INR therapetic on warfarin.  Entered: Jack Spann MD 11/11/2019, 11:31 AM         Interval History   Doing OK.  Occasional right leg soreness which he massages.    -Data reviewed today: I reviewed all new labs and imaging over the last 24 hours. I personally reviewed no images or EKG's today.    Physical Exam    , Blood pressure 112/67, pulse 75, temperature 97.6  F (36.4  C), temperature source Oral, resp. rate 18, weight 90.5 kg (199 lb 8.3 oz), SpO2 98 %.  Vitals:    11/10/19 1530   Weight: 90.5 kg (199 lb 8.3 oz)     Vital Signs with Ranges  Temp:  [97.6  F (36.4  C)-98  F (36.7  C)] 97.6  F (36.4  C)  Pulse:  [50-75] 75  Resp:  [18] 18  BP: (110-118)/(52-67) 112/67  SpO2:  [97 %-98 %] 98 %  Patient Vitals for the past 24 hrs:   BP Temp Temp src Pulse Resp SpO2 Weight   11/11/19 0742 112/67 97.6  F  (36.4  C) Oral 75 18 98 % --   11/11/19 0400 115/60 98  F (36.7  C) Oral 55 18 -- --   11/10/19 1945 118/66 97.9  F (36.6  C) Oral 50 18 97 % --   11/10/19 1530 -- -- -- -- -- -- 90.5 kg (199 lb 8.3 oz)   11/10/19 1501 110/52 98  F (36.7  C) Oral 58 18 98 % --     I/O's Last 24 hours  I/O last 3 completed shifts:  In: 1118.5 [P.O.:1000; I.V.:118.5]  Out: 800 [Urine:800]    Constitutional: Alert, oriented, pleasant.  Respiratory: Diminished in bases. No crackles or wheezes.  Cardiovascular: RRR, +I/VI systolic m, mechanical valve sounds.  GI:   Skin/Integumen:   Other:        Data   Recent Labs   Lab 11/10/19  1509 11/08/19  0906 11/05/19  1338   WBC 9.3  --   --    HGB 11.2*  --   --    MCV 89  --   --      --   --    INR 1.65* 3.2* 2.0*     --   --    POTASSIUM 3.9  --   --    CHLORIDE 104  --   --    CO2 28  --   --    BUN 17  --   --    CR 0.94  --   --    ANIONGAP 5  --   --    AWILDA 8.8  --   --    GLC 93  --   --      Recent Labs   Lab Test 11/10/19  1509 07/24/19  0648 07/23/19  0623 07/19/19  1751 05/30/19  1101  05/19/18  0142  10/31/15  0556 10/30/15  0808  10/23/13  1118  10/22/13  2051   GLC 93 127* 149* 98 106*   < >  --    < >  --   --    < >  --    < >  --    BGM  --   --   --   --   --   --  94  --  131* 111*  --  114*  --  119*    < > = values in this interval not displayed.         No results found for this or any previous visit (from the past 24 hour(s)).    Medications   All medications were reviewed.    HEParin 900 Units/hr (11/11/19 1038)       cephALEXin  250 mg Oral Q6H LETITIA     ezetimibe  10 mg Oral QPM     ferrous sulfate  325 mg Oral Daily with breakfast     furosemide  20 mg Oral Daily     mesalamine  800 mg Oral BID     metoprolol tartrate  25 mg Oral BID     rosuvastatin  40 mg Oral QPM     sodium chloride (PF)  3 mL Intracatheter Q8H     tamsulosin  0.4 mg Oral QPM

## 2019-11-11 NOTE — PROGRESS NOTES
Plastic Surgery    Chart reviewed. Patient sleeping    PE: Temp: 97.6  F (36.4  C) Temp src: Oral BP: 112/67 Pulse: 75   Resp: 18 SpO2: 98 % O2 Device: None (Room air)      deferred    A/P:  Management per Hospital service. Appreciate assistance. Plan for surgery Tuesday 3 pm.    Sara Martinez MD  Plastic Surgery  [facility]  [phone] (office)

## 2019-11-12 ENCOUNTER — ANESTHESIA EVENT (OUTPATIENT)
Dept: SURGERY | Facility: CLINIC | Age: 79
DRG: 904 | End: 2019-11-12
Payer: MEDICARE

## 2019-11-12 ENCOUNTER — ANESTHESIA (OUTPATIENT)
Dept: SURGERY | Facility: CLINIC | Age: 79
DRG: 904 | End: 2019-11-12
Payer: MEDICARE

## 2019-11-12 LAB
INR PPP: 1.18 (ref 0.86–1.14)
LMWH PPP CHRO-ACNC: <0.1 IU/ML

## 2019-11-12 PROCEDURE — 36415 COLL VENOUS BLD VENIPUNCTURE: CPT | Performed by: PLASTIC SURGERY

## 2019-11-12 PROCEDURE — 0KXS0ZZ TRANSFER RIGHT LOWER LEG MUSCLE, OPEN APPROACH: ICD-10-PCS | Performed by: PLASTIC SURGERY

## 2019-11-12 PROCEDURE — 40000170 ZZH STATISTIC PRE-PROCEDURE ASSESSMENT II: Performed by: PLASTIC SURGERY

## 2019-11-12 PROCEDURE — 71000013 ZZH RECOVERY PHASE 1 LEVEL 1 EA ADDTL HR: Performed by: PLASTIC SURGERY

## 2019-11-12 PROCEDURE — 93010 ELECTROCARDIOGRAM REPORT: CPT | Performed by: INTERNAL MEDICINE

## 2019-11-12 PROCEDURE — 12000000 ZZH R&B MED SURG/OB

## 2019-11-12 PROCEDURE — 25000125 ZZHC RX 250: Performed by: NURSE ANESTHETIST, CERTIFIED REGISTERED

## 2019-11-12 PROCEDURE — 25000132 ZZH RX MED GY IP 250 OP 250 PS 637: Mod: GY | Performed by: PHYSICIAN ASSISTANT

## 2019-11-12 PROCEDURE — 25000128 H RX IP 250 OP 636: Performed by: NURSE ANESTHETIST, CERTIFIED REGISTERED

## 2019-11-12 PROCEDURE — 25000132 ZZH RX MED GY IP 250 OP 250 PS 637: Mod: GY | Performed by: PLASTIC SURGERY

## 2019-11-12 PROCEDURE — 99232 SBSQ HOSP IP/OBS MODERATE 35: CPT | Performed by: INTERNAL MEDICINE

## 2019-11-12 PROCEDURE — 93005 ELECTROCARDIOGRAM TRACING: CPT

## 2019-11-12 PROCEDURE — 85610 PROTHROMBIN TIME: CPT | Performed by: PLASTIC SURGERY

## 2019-11-12 PROCEDURE — P9041 ALBUMIN (HUMAN),5%, 50ML: HCPCS | Performed by: NURSE ANESTHETIST, CERTIFIED REGISTERED

## 2019-11-12 PROCEDURE — 25800030 ZZH RX IP 258 OP 636: Performed by: ANESTHESIOLOGY

## 2019-11-12 PROCEDURE — 37000008 ZZH ANESTHESIA TECHNICAL FEE, 1ST 30 MIN: Performed by: PLASTIC SURGERY

## 2019-11-12 PROCEDURE — 25800030 ZZH RX IP 258 OP 636: Performed by: NURSE ANESTHETIST, CERTIFIED REGISTERED

## 2019-11-12 PROCEDURE — 0KBS0ZZ EXCISION OF RIGHT LOWER LEG MUSCLE, OPEN APPROACH: ICD-10-PCS | Performed by: PLASTIC SURGERY

## 2019-11-12 PROCEDURE — 25000128 H RX IP 250 OP 636: Performed by: PLASTIC SURGERY

## 2019-11-12 PROCEDURE — 85520 HEPARIN ASSAY: CPT | Performed by: PLASTIC SURGERY

## 2019-11-12 PROCEDURE — 37000009 ZZH ANESTHESIA TECHNICAL FEE, EACH ADDTL 15 MIN: Performed by: PLASTIC SURGERY

## 2019-11-12 PROCEDURE — 36000093 ZZH SURGERY LEVEL 4 1ST 30 MIN: Performed by: PLASTIC SURGERY

## 2019-11-12 PROCEDURE — 27210794 ZZH OR GENERAL SUPPLY STERILE: Performed by: PLASTIC SURGERY

## 2019-11-12 PROCEDURE — 25000125 ZZHC RX 250: Performed by: PLASTIC SURGERY

## 2019-11-12 PROCEDURE — 25000128 H RX IP 250 OP 636: Performed by: ANESTHESIOLOGY

## 2019-11-12 PROCEDURE — 0HRKX74 REPLACEMENT OF RIGHT LOWER LEG SKIN WITH AUTOLOGOUS TISSUE SUBSTITUTE, PARTIAL THICKNESS, EXTERNAL APPROACH: ICD-10-PCS | Performed by: PLASTIC SURGERY

## 2019-11-12 PROCEDURE — 99207 ZZC CDG-MDM COMPONENT: MEETS MODERATE - UP CODED: CPT | Performed by: INTERNAL MEDICINE

## 2019-11-12 PROCEDURE — 25800030 ZZH RX IP 258 OP 636: Performed by: PLASTIC SURGERY

## 2019-11-12 PROCEDURE — 71000012 ZZH RECOVERY PHASE 1 LEVEL 1 FIRST HR: Performed by: PLASTIC SURGERY

## 2019-11-12 PROCEDURE — 25000566 ZZH SEVOFLURANE, EA 15 MIN: Performed by: PLASTIC SURGERY

## 2019-11-12 PROCEDURE — 51702 INSERT TEMP BLADDER CATH: CPT | Performed by: UROLOGY

## 2019-11-12 PROCEDURE — 25000132 ZZH RX MED GY IP 250 OP 250 PS 637: Mod: GY

## 2019-11-12 PROCEDURE — 0HBHXZZ EXCISION OF RIGHT UPPER LEG SKIN, EXTERNAL APPROACH: ICD-10-PCS | Performed by: PLASTIC SURGERY

## 2019-11-12 PROCEDURE — 36000063 ZZH SURGERY LEVEL 4 EA 15 ADDTL MIN: Performed by: PLASTIC SURGERY

## 2019-11-12 RX ORDER — EPHEDRINE SULFATE 50 MG/ML
INJECTION, SOLUTION INTRAMUSCULAR; INTRAVENOUS; SUBCUTANEOUS PRN
Status: DISCONTINUED | OUTPATIENT
Start: 2019-11-12 | End: 2019-11-12

## 2019-11-12 RX ORDER — METHOCARBAMOL 750 MG/1
750 TABLET, FILM COATED ORAL EVERY 6 HOURS PRN
Status: DISCONTINUED | OUTPATIENT
Start: 2019-11-12 | End: 2019-11-19 | Stop reason: HOSPADM

## 2019-11-12 RX ORDER — HEPARIN SODIUM 10000 [USP'U]/100ML
1500 INJECTION, SOLUTION INTRAVENOUS CONTINUOUS
Status: DISCONTINUED | OUTPATIENT
Start: 2019-11-13 | End: 2019-11-19 | Stop reason: HOSPADM

## 2019-11-12 RX ORDER — SODIUM CHLORIDE, SODIUM LACTATE, POTASSIUM CHLORIDE, CALCIUM CHLORIDE 600; 310; 30; 20 MG/100ML; MG/100ML; MG/100ML; MG/100ML
INJECTION, SOLUTION INTRAVENOUS CONTINUOUS
Status: DISCONTINUED | OUTPATIENT
Start: 2019-11-12 | End: 2019-11-12 | Stop reason: HOSPADM

## 2019-11-12 RX ORDER — PROPOFOL 10 MG/ML
INJECTION, EMULSION INTRAVENOUS PRN
Status: DISCONTINUED | OUTPATIENT
Start: 2019-11-12 | End: 2019-11-12

## 2019-11-12 RX ORDER — HEPARIN SODIUM 5000 [USP'U]/.5ML
5000 INJECTION, SOLUTION INTRAVENOUS; SUBCUTANEOUS EVERY 8 HOURS
Status: DISCONTINUED | OUTPATIENT
Start: 2019-11-13 | End: 2019-11-12

## 2019-11-12 RX ORDER — CEFAZOLIN SODIUM 2 G/100ML
2 INJECTION, SOLUTION INTRAVENOUS
Status: COMPLETED | OUTPATIENT
Start: 2019-11-12 | End: 2019-11-12

## 2019-11-12 RX ORDER — ONDANSETRON 4 MG/1
4 TABLET, ORALLY DISINTEGRATING ORAL EVERY 30 MIN PRN
Status: DISCONTINUED | OUTPATIENT
Start: 2019-11-12 | End: 2019-11-12 | Stop reason: HOSPADM

## 2019-11-12 RX ORDER — NALOXONE HYDROCHLORIDE 0.4 MG/ML
.1-.4 INJECTION, SOLUTION INTRAMUSCULAR; INTRAVENOUS; SUBCUTANEOUS
Status: DISCONTINUED | OUTPATIENT
Start: 2019-11-12 | End: 2019-11-12

## 2019-11-12 RX ORDER — SODIUM CHLORIDE, SODIUM LACTATE, POTASSIUM CHLORIDE, CALCIUM CHLORIDE 600; 310; 30; 20 MG/100ML; MG/100ML; MG/100ML; MG/100ML
INJECTION, SOLUTION INTRAVENOUS CONTINUOUS
Status: DISCONTINUED | OUTPATIENT
Start: 2019-11-12 | End: 2019-11-16

## 2019-11-12 RX ORDER — FENTANYL CITRATE 50 UG/ML
INJECTION, SOLUTION INTRAMUSCULAR; INTRAVENOUS PRN
Status: DISCONTINUED | OUTPATIENT
Start: 2019-11-12 | End: 2019-11-12

## 2019-11-12 RX ORDER — ONDANSETRON 2 MG/ML
4 INJECTION INTRAMUSCULAR; INTRAVENOUS EVERY 30 MIN PRN
Status: DISCONTINUED | OUTPATIENT
Start: 2019-11-12 | End: 2019-11-12 | Stop reason: HOSPADM

## 2019-11-12 RX ORDER — NEOSTIGMINE METHYLSULFATE 1 MG/ML
VIAL (ML) INJECTION PRN
Status: DISCONTINUED | OUTPATIENT
Start: 2019-11-12 | End: 2019-11-12

## 2019-11-12 RX ORDER — FENTANYL CITRATE 50 UG/ML
25-50 INJECTION, SOLUTION INTRAMUSCULAR; INTRAVENOUS
Status: DISCONTINUED | OUTPATIENT
Start: 2019-11-12 | End: 2019-11-12 | Stop reason: HOSPADM

## 2019-11-12 RX ORDER — CEFAZOLIN SODIUM 1 G/3ML
1 INJECTION, POWDER, FOR SOLUTION INTRAMUSCULAR; INTRAVENOUS SEE ADMIN INSTRUCTIONS
Status: DISCONTINUED | OUTPATIENT
Start: 2019-11-12 | End: 2019-11-12 | Stop reason: HOSPADM

## 2019-11-12 RX ORDER — MINERAL OIL
OIL (ML) MISCELLANEOUS PRN
Status: DISCONTINUED | OUTPATIENT
Start: 2019-11-12 | End: 2019-11-12 | Stop reason: HOSPADM

## 2019-11-12 RX ORDER — ALBUMIN, HUMAN INJ 5% 5 %
SOLUTION INTRAVENOUS CONTINUOUS PRN
Status: DISCONTINUED | OUTPATIENT
Start: 2019-11-12 | End: 2019-11-12

## 2019-11-12 RX ORDER — HYDROMORPHONE HYDROCHLORIDE 1 MG/ML
.3-.5 INJECTION, SOLUTION INTRAMUSCULAR; INTRAVENOUS; SUBCUTANEOUS EVERY 5 MIN PRN
Status: DISCONTINUED | OUTPATIENT
Start: 2019-11-12 | End: 2019-11-12 | Stop reason: HOSPADM

## 2019-11-12 RX ORDER — LIDOCAINE 40 MG/G
CREAM TOPICAL
Status: DISCONTINUED | OUTPATIENT
Start: 2019-11-12 | End: 2019-11-19 | Stop reason: HOSPADM

## 2019-11-12 RX ORDER — MINERAL OIL
OIL (ML) MISCELLANEOUS
Status: DISCONTINUED
Start: 2019-11-12 | End: 2019-11-12 | Stop reason: HOSPADM

## 2019-11-12 RX ORDER — GLYCOPYRROLATE 0.2 MG/ML
INJECTION, SOLUTION INTRAMUSCULAR; INTRAVENOUS PRN
Status: DISCONTINUED | OUTPATIENT
Start: 2019-11-12 | End: 2019-11-12

## 2019-11-12 RX ADMIN — PHENYLEPHRINE HYDROCHLORIDE 150 MCG: 10 INJECTION INTRAVENOUS at 16:48

## 2019-11-12 RX ADMIN — METOPROLOL TARTRATE 25 MG: 25 TABLET ORAL at 09:04

## 2019-11-12 RX ADMIN — SODIUM CHLORIDE, POTASSIUM CHLORIDE, SODIUM LACTATE AND CALCIUM CHLORIDE: 600; 310; 30; 20 INJECTION, SOLUTION INTRAVENOUS at 14:52

## 2019-11-12 RX ADMIN — PROPOFOL 150 MG: 10 INJECTION, EMULSION INTRAVENOUS at 15:58

## 2019-11-12 RX ADMIN — ROCURONIUM BROMIDE 10 MG: 10 INJECTION INTRAVENOUS at 17:17

## 2019-11-12 RX ADMIN — FERROUS SULFATE TAB 325 MG (65 MG ELEMENTAL FE) 325 MG: 325 (65 FE) TAB at 09:04

## 2019-11-12 RX ADMIN — Medication 3000 UNITS: at 09:21

## 2019-11-12 RX ADMIN — PHENYLEPHRINE HYDROCHLORIDE 100 MCG: 10 INJECTION INTRAVENOUS at 18:49

## 2019-11-12 RX ADMIN — HYDROCODONE BITARTRATE AND ACETAMINOPHEN 1 TABLET: 5; 325 TABLET ORAL at 23:24

## 2019-11-12 RX ADMIN — FENTANYL CITRATE 50 MCG: 50 INJECTION, SOLUTION INTRAMUSCULAR; INTRAVENOUS at 15:58

## 2019-11-12 RX ADMIN — MESALAMINE 800 MG: 800 TABLET, DELAYED RELEASE ORAL at 09:04

## 2019-11-12 RX ADMIN — PHENYLEPHRINE HYDROCHLORIDE 100 MCG: 10 INJECTION INTRAVENOUS at 17:53

## 2019-11-12 RX ADMIN — HYDROMORPHONE HYDROCHLORIDE 0.5 MG: 1 INJECTION, SOLUTION INTRAMUSCULAR; INTRAVENOUS; SUBCUTANEOUS at 19:49

## 2019-11-12 RX ADMIN — FENTANYL CITRATE 50 MCG: 50 INJECTION, SOLUTION INTRAMUSCULAR; INTRAVENOUS at 16:42

## 2019-11-12 RX ADMIN — ROCURONIUM BROMIDE 20 MG: 10 INJECTION INTRAVENOUS at 16:43

## 2019-11-12 RX ADMIN — ROCURONIUM BROMIDE 50 MG: 10 INJECTION INTRAVENOUS at 15:58

## 2019-11-12 RX ADMIN — HYDROMORPHONE HYDROCHLORIDE 0.5 MG: 1 INJECTION, SOLUTION INTRAMUSCULAR; INTRAVENOUS; SUBCUTANEOUS at 18:47

## 2019-11-12 RX ADMIN — SODIUM CHLORIDE, POTASSIUM CHLORIDE, SODIUM LACTATE AND CALCIUM CHLORIDE: 600; 310; 30; 20 INJECTION, SOLUTION INTRAVENOUS at 18:19

## 2019-11-12 RX ADMIN — CEFAZOLIN SODIUM 2 G: 2 INJECTION, SOLUTION INTRAVENOUS at 16:04

## 2019-11-12 RX ADMIN — ONDANSETRON 4 MG: 2 INJECTION INTRAMUSCULAR; INTRAVENOUS at 19:08

## 2019-11-12 RX ADMIN — PHENYLEPHRINE HYDROCHLORIDE 100 MCG: 10 INJECTION INTRAVENOUS at 16:09

## 2019-11-12 RX ADMIN — GLYCOPYRROLATE 0.8 MG: 0.2 INJECTION, SOLUTION INTRAMUSCULAR; INTRAVENOUS at 19:14

## 2019-11-12 RX ADMIN — HYDROMORPHONE HYDROCHLORIDE 0.5 MG: 1 INJECTION, SOLUTION INTRAMUSCULAR; INTRAVENOUS; SUBCUTANEOUS at 20:54

## 2019-11-12 RX ADMIN — PHENYLEPHRINE HYDROCHLORIDE 100 MCG: 10 INJECTION INTRAVENOUS at 17:16

## 2019-11-12 RX ADMIN — PROPOFOL 30 MG: 10 INJECTION, EMULSION INTRAVENOUS at 16:00

## 2019-11-12 RX ADMIN — CEFAZOLIN SODIUM 1 G: 2 INJECTION, SOLUTION INTRAVENOUS at 18:04

## 2019-11-12 RX ADMIN — NEOSTIGMINE METHYLSULFATE 4 MG: 1 INJECTION, SOLUTION INTRAVENOUS at 19:14

## 2019-11-12 RX ADMIN — SODIUM CHLORIDE, POTASSIUM CHLORIDE, SODIUM LACTATE AND CALCIUM CHLORIDE: 600; 310; 30; 20 INJECTION, SOLUTION INTRAVENOUS at 23:15

## 2019-11-12 RX ADMIN — ALBUMIN HUMAN: 0.05 INJECTION, SOLUTION INTRAVENOUS at 16:09

## 2019-11-12 RX ADMIN — CEPHALEXIN 250 MG: 250 CAPSULE ORAL at 11:36

## 2019-11-12 RX ADMIN — ROCURONIUM BROMIDE 10 MG: 10 INJECTION INTRAVENOUS at 18:17

## 2019-11-12 RX ADMIN — HYDROCODONE BITARTRATE AND ACETAMINOPHEN 1 TABLET: 5; 325 TABLET ORAL at 09:04

## 2019-11-12 RX ADMIN — PHENYLEPHRINE HYDROCHLORIDE 100 MCG: 10 INJECTION INTRAVENOUS at 19:20

## 2019-11-12 RX ADMIN — HYDROMORPHONE HYDROCHLORIDE 0.5 MG: 1 INJECTION, SOLUTION INTRAMUSCULAR; INTRAVENOUS; SUBCUTANEOUS at 20:10

## 2019-11-12 RX ADMIN — HYDROCODONE BITARTRATE AND ACETAMINOPHEN 1 TABLET: 5; 325 TABLET ORAL at 23:13

## 2019-11-12 RX ADMIN — PHENYLEPHRINE HYDROCHLORIDE 200 MCG: 10 INJECTION INTRAVENOUS at 17:24

## 2019-11-12 RX ADMIN — PROPOFOL 20 MG: 10 INJECTION, EMULSION INTRAVENOUS at 16:42

## 2019-11-12 RX ADMIN — Medication 5 MG: at 16:06

## 2019-11-12 RX ADMIN — CEPHALEXIN 250 MG: 250 CAPSULE ORAL at 05:34

## 2019-11-12 RX ADMIN — CEPHALEXIN 250 MG: 250 CAPSULE ORAL at 23:24

## 2019-11-12 ASSESSMENT — ENCOUNTER SYMPTOMS: DYSRHYTHMIAS: 1

## 2019-11-12 ASSESSMENT — ACTIVITIES OF DAILY LIVING (ADL)
ADLS_ACUITY_SCORE: 14

## 2019-11-12 NOTE — PLAN OF CARE
Dx: Chronic wound of right knee Hx: Total knee arthroplasty 7/22/19, HTN, MVR, CAD, A-fib/flutter. Surg: plan to have right knee flap today at 3pm. VSS on RA. A/Ox4. Incisions on right knee CDI. CMS intact. Pain controlled with PRN Norco. Up independent. Tolerating reg diet. Denied N&V. +gas, No bm on shift. Voiding. LS clear. Bridging patient to Heparin. Will continue to monitor.

## 2019-11-12 NOTE — PROGRESS NOTES
[facility]    Internal Medicine Hospitalist Progress Note  11/12/2019  I evaluated patient on the above date.    Jack Spann Jr., MD  [phone] (p)  Text Page        Assessment & Plan New actions/orders today (11/12/2019) are underlined.    Fausto Farr is a 79-year-old gentleman with past medical history including obesity; ulcerative colitis; CAD; HTN; afib/flutter; and mechanical AVR and MVR on warfarin; who has had a delayed healing with persistent wound and tendon visible following a total knee arthroplasty (7/2019) that requires surgery. Admitted 11/10/2019 for bridging anticoagulation prior to surgery (planned for 11/12).    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    * Had R TKA 7/22/2019. Later suffered wound breakdown, right knee, and underwent I&D and secondary wound closure 8/29/2019. Developed non-healing wound with visible tendon and referred to Plastics. PTA on cefadroxil.  - Plan surgical flap with skin graft today 11/12/2019 per Plastic Surgery.    - Continue cephalexin (started 11/10 as formulary alternative to cefadroxil which pt was on PTA).    S/p mechanical aortic and mitral valve replacements.  * Originally had AVR (along with CABG) 2006. Underwent redo sternotomy with mechanical AVR and MVR 2013. Historically on chronic anticoagulation with warfarin with a goal INR of 2.5 to 3.5.   * The patient had been off warfarin since Friday, 11/08/2019 in anticipation of needing bridging for surgery.   * INR 1.65 on admit 11/10. Started on bridging heparin gtt 11/10.  Recent Labs   Lab 11/12/19  0813 11/10/19  1509 11/08/19  0906 11/05/19  1338   INR 1.18* 1.65* 3.2* 2.0*   - Restart heparin gtt when able post-surgery per Plastics.  - Restart warfarin when able post-surgery per Plastics.    Anemia, suspect chronic component.  Hgb 11.2 on admit 11/10. No overt clinical signs of major bleeding.   Recent Labs   Lab  11/10/19  1509   HGB 11.2*   - Monitor CBC.  - Consider prbc transfusion if hgb </= 7.0 or if significant bleeding with hemodynamic instability or if symptomatic.    CAD.  Hypertension (benign essential).  [PTA: furosemide 20 mg daily; metoprolol 25 mg BID.]  * S/p CABG (along with AVR) in 2006. Nuc stress 4/2019 showed no ischemia, LVEF 59%.  - Continue metoprolol, rosuvastatin.  - Hold furosemide for now.   - Monitor i/o's, daily wts.    Atrial fibrillation and atrial flutter, status post ablation (4/2019).  - Continue metoprolol.  - Anticoagulation management as above.     PAD.  H/o AAA stent repair.  - Continue rosuvastatin.    GAGE.  - Continue CPAP.    HLD.  - Continue ezetimibe and rosuvastatin.    Benign prostatic hypertrophy.    - Continue prior to admission tamsulosin.     Diet: NPO per Anesthesia Guidelines for Procedure/Surgery Except for: Meds    Prophylaxis: PCD's, ambulation. On heparin gtt.  Lord Catheter: not present  Code Status: Full Code      Disposition Plan   Expected discharge: 4-5d, recommended to prior living arrangement once stable from surgical stanspoint and once INR therapetic on warfarin.  Entered: Jack Spann MD 11/12/2019, 12:18 PM         Interval History   Notes right lower extremity was a bit stiff and had difficulty flexing at knee earlier.  Otherwise, doing OK.    -Data reviewed today: I reviewed all new labs and imaging over the last 24 hours. I personally reviewed no images or EKG's today.    Physical Exam    , Blood pressure 104/52, pulse 73, temperature 97.5  F (36.4  C), temperature source Oral, resp. rate 16, weight 90.5 kg (199 lb 8.3 oz), SpO2 96 %.  Vitals:    11/10/19 1530   Weight: 90.5 kg (199 lb 8.3 oz)     Vital Signs with Ranges  Temp:  [97.5  F (36.4  C)-98.3  F (36.8  C)] 97.5  F (36.4  C)  Pulse:  [69-83] 73  Resp:  [16] 16  BP: (104-123)/(52-76) 104/52  SpO2:  [90 %-99 %] 96 %  Patient Vitals for the past 24 hrs:   BP Temp Temp src Pulse Resp SpO2    11/12/19 1123 104/52 97.5  F (36.4  C) Oral 73 16 96 %   11/12/19 0733 116/64 98.3  F (36.8  C) Oral 75 16 96 %   11/11/19 2300 117/68 98.2  F (36.8  C) Oral 69 16 93 %   11/11/19 2053 113/76 -- -- 83 -- --   11/11/19 1722 -- -- -- -- 16 99 %   11/11/19 1601 123/64 97.7  F (36.5  C) Oral 74 16 90 %     I/O's Last 24 hours  I/O last 3 completed shifts:  In: 375 [P.O.:375]  Out: -     Constitutional: Alert, oriented, pleasant.  Respiratory: Diminished in bases, no crackles or wheezes.  Cardiovascular: RRR, +I/VI systolic m, mechanical valve sounds.  GI:   Skin/Integumen:   Other:        Data   Recent Labs   Lab 11/12/19  0813 11/10/19  1509 11/08/19  0906   WBC  --  9.3  --    HGB  --  11.2*  --    MCV  --  89  --    PLT  --  328  --    INR 1.18* 1.65* 3.2*   NA  --  137  --    POTASSIUM  --  3.9  --    CHLORIDE  --  104  --    CO2  --  28  --    BUN  --  17  --    CR  --  0.94  --    ANIONGAP  --  5  --    AWILDA  --  8.8  --    GLC  --  93  --      Recent Labs   Lab Test 11/10/19  1509 07/24/19  0648 07/23/19  0623 07/19/19  1751 05/30/19  1101  05/19/18  0142  10/31/15  0556 10/30/15  0808  10/23/13  1118  10/22/13  2051   GLC 93 127* 149* 98 106*   < >  --    < >  --   --    < >  --    < >  --    BGM  --   --   --   --   --   --  94  --  131* 111*  --  114*  --  119*    < > = values in this interval not displayed.         No results found for this or any previous visit (from the past 24 hour(s)).    Medications   All medications were reviewed.    HEParin Stopped (11/12/19 7168)       cephALEXin  250 mg Oral Q6H LETITIA     ezetimibe  10 mg Oral QPM     ferrous sulfate  325 mg Oral Daily with breakfast     mesalamine  800 mg Oral BID     metoprolol tartrate  25 mg Oral BID     rosuvastatin  40 mg Oral QPM     sodium chloride (PF)  3 mL Intracatheter Q8H     tamsulosin  0.4 mg Oral QPM

## 2019-11-12 NOTE — OR NURSING
LESLIE CATHETER ATTEMPTED TO BE INSERTED BY GRECIA TYSON RN--AFTER TWO ATTEMPTS WITH RESISTANCE, 2 SIZES, UROLOGY WAS CALLED. DR NARVAEZ INSERTED A 20FR COUDE CATHETER.

## 2019-11-12 NOTE — ANESTHESIA PREPROCEDURE EVALUATION
Anesthesia Pre-Procedure Evaluation    Patient: Fausto Farr   MRN: [id number] : 1940          Preoperative Diagnosis: * No pre-op diagnosis entered *    Procedure(s):  RIGHT GASTRONEMIUS FLAP TO RIGHT KNEE, SPLIT THICKNESS SKIN GRAFT FROM RIGHT THIGH TO RIGHT KNEE,  APPLICATION, WOUND VAC  SURGICAL PROCUREMENT, FLAP, PEDICLE, EXTREMITY    Past Medical History:   Diagnosis Date     Abdominal pain      Abnormal ECG     RBBB, 1st degree AVB, Left axis deviation     Anemia     currently taking iron     Arrhythmia     pac, pvc     Back pain     since      BPH (benign prostatic hyperplasia)      Bruit      CAD (coronary artery disease)      Cellulitis 10/18/12     Cellulitis 2018    GrpB strep LLE cellulitis  negative RACHAEL for veg     Chronic venous insufficiency     bilat lower extremities     Contact dermatitis and other eczema, due to unspecified cause      Diaphragmatic hernia without mention of obstruction or gangrene      Diastolic HF (heart failure) (H)      Gastric ulcer      Glucose intolerance (impaired glucose tolerance)      Heart murmur 13    valvular heart disease     Hyperlipidemia LDL goal <100 2013     Hypertension 13     Lumbago      Malaise and fatigue      Metabolic syndrome      Mobitz (type) I (Wenckebach's) atrioventricular block     and RBBB     Nocturia 10/18/12     Nonallopathic lesion of cervical region      Nonallopathic lesion of lumbar region      Nonallopathic lesion of pelvic region, not elsewhere classified      Nonallopathic lesion of rib cage      Nonallopathic lesion of sacral region      GAGE (obstructive sleep apnea)     CPAP     Paroxysmal atrial fibrillation (H) 10/18/12     Prostate cancer (H)     radiation seed, XRT      PVD (peripheral vascular disease) (H)      RBBB     1st degree AVB, RBBB, LAHB     Rotator cuff strain     and sprain     S/P AAA repair      S/P aortic valve replacement     developed perivalve leak and MS, therefore redo  surg 2013     S/P CABG (coronary artery bypass graft) 2006    Lima-Lad, RA-Rca     Sciatica of left side     since 2000     Sepsis due to group B Streptococcus (H) 5/19/2018     Ulcerative colitis (H)      Varicose veins of bilateral lower extremities with other complications     s/p RLE vein stripping     Vitamin D deficiency      Past Surgical History:   Procedure Laterality Date     AORTIC VALVE REPLACEMENT  1/3/06    redo AVR SJM 21mm and SJM MVR 27mm in 2013SJM 21([id number]):AVR, SJM 27 :MVR-     ARTHROPLASTY KNEE      right knee     ARTHROPLASTY KNEE Right 7/22/2019    Procedure: Right total knee arthroplasty;  Surgeon: Prasanth Jensen MD;  Location:  OR     BACK SURGERY  Oct 2015    Fusion L4-5, laminectomy L2, L3     BYPASS GRAFT ARTERY CORONARY  10/2013    reimplantation of radial artery graft to RCA     C CABG, VEIN, TWO  1/3/06    Left radial to RCA, LIMA to LAD (RA to RCA reimplanted at time of 2013 surg)     CARDIAC CATHERIZATION  11/2005    Stent placed to RCA     CARDIAC CATHERIZATION  04/2013    Occluded RCA, patent LIMA to LAD and radial graft to PDA     CARPAL TUNNEL RELEASE RT/LT  1994     COLONOSCOPY  8-22-11     CYSTOSCOPY FLEXIBLE  10/16/2013    Procedure: CYSTOSCOPY FLEXIBLE;  FLEXIBLE CYSTOSCOPY / DILATION OF URETHRA / INSERTION OF LESLIE;  Surgeon: Cooper Wallace MD;  Location:  OR     ENDOVASCULAR REPAIR, INFRARENAL ABDOMINAL AORTIC ANEURYSM/DISSECTION; MODULAR BIFURCATED PROSTHESIS  2006    AAA repair endovascular     ENT SURGERY       EP ABLATION ATRIAL FLUTTER N/A 4/22/2019    Procedure: EP Ablation Atrial Flutter;  Surgeon: Jessy Vasquez MD;  Location:  HEART [facility]ARD[facility] CATH LAB     GENITOURINARY SURGERY  6/16/08    radioactive seeding     HEAD & NECK SURGERY  1997    vocal cord polypectomy     INCISION AND DRAINAGE KNEE, COMBINED Right 8/29/2019    Procedure: INCISION AND DRAINAGE, KNEE W/ Secondary Wound Closure;  Surgeon: Prasanth Jensen MD;   Location: [facility] OR     KNEE SURGERY  2001 Right knee arthroscopy     OPTICAL TRACKING SYSTEM FUSION SPINE POSTERIOR LUMBAR THREE+ LEVELS N/A 10/29/2015    Procedure: OPTICAL TRACKING SYSTEM FUSION SPINE POSTERIOR LUMBAR THREE+ LEVELS;  Surgeon: Walt Garcia MD;  Location: [facility] OR     PROSTATE SURGERY      radioactive seeding 6/16/08     REPAIR ANEURYSM ABDOMINAL AORTA  06/08     REPAIR VALVE MITRAL  10/16/2013    SJM 21([id number] 756):AVR, SJM 27 :MVRProcedure: REPAIR VALVE MITRAL;  REDO STERNOTOMY/REDO AORTIC VALVE REPLACEMENT/ MITRAL VALVE REPLACEMENT/REIMPLANTATION OF RIGHT CORONARY ARTERY BYPASS WITH RACHAEL ( ON PUMP);  Surgeon: Viet Singh MD;  Location: [facility] OR     REPLACE VALVE AORTIC  10/16/2013    Procedure: REPLACE VALVE AORTIC;;  Surgeon: Viet Singh MD;  Location: [facility] OR     SURGERY GENERAL IP CONSULT  05/2008 Excision aneurysm abdominal aorta     SURGERY GENERAL IP CONSULT  1997 Vocal cord polypectomy     VASCULAR SURGERY  1968, 1993     varicose vein stripping     Allergies   Allergen Reactions     Bees Anaphylaxis     Prior to Admission medications    Medication Sig Start Date End Date Taking? Authorizing Provider   acetaminophen (TYLENOL) 325 MG tablet Take 1-2 tablets (325-650 mg) by mouth every 6 hours as needed for mild pain   Yes Doctor, Inga, MD   betamethasone valerate (VALISONE) 0.1 % external lotion Apply topically 2 times daily Apply topically to scalp twice daily PRN 10/15/19  Yes Jodi Flower Mai, MD   cefadroxil (DURICEF) 500 MG capsule TK 1 C PO BID 9/30/19  Yes Reported, Patient   cholecalciferol 25 MCG (1000 UT) TABS Take 1,000 Units by mouth daily   Yes Unknown, Entered By History   ezetimibe (ZETIA) 10 MG tablet Take 1 tablet (10 mg) by mouth daily 7/8/19  Yes Magdiel Dumas MD   ferrous sulfate (FEROSUL) 325 (65 Fe) MG tablet Take 325 mg by mouth daily (with breakfast)   Yes Unknown, Entered By History   fluocinonide (LIDEX) 0.05 % external ointment Apply  topically 2 times daily as needed   Yes Reported, Patient   furosemide (LASIX) 40 MG tablet Take 0.5 tablets (20 mg) by mouth daily 5/30/19  Yes Tu Reyes MD   glucosamine-chondroitin 500-400 MG CAPS per capsule Take 1 capsule by mouth 2 times daily   Yes Unknown, Entered By History   mesalamine (ASACOL HD) 800 MG EC tablet Take 1 tablet (800 mg) by mouth 2 times daily 5/30/19  Yes Tu Reyes MD   metoprolol tartrate (LOPRESSOR) 25 MG tablet Take 1 tablet (25 mg) by mouth 2 times daily 5/30/19  Yes Tu Reyes MD   rosuvastatin (CRESTOR) 40 MG tablet Take 1 tablet (40 mg) by mouth daily 7/8/19  Yes Magdiel Dumas MD   tamsulosin (FLOMAX) 0.4 MG capsule TAKE 1 CAPSULE BY MOUTH EVERY DAY 5/30/19  Yes Tu Reyes MD   warfarin ANTICOAGULANT (COUMADIN) 5 MG tablet Take 5 mg (1 tablet) every Mon, Wed, Fri; 7.5 mg (1.5 tablets) all other days or as directed by INR Clinic 10/8/19  Yes Tu Reyes MD     ECG: Sinus  Rhythm  -First degree A-V block   Deng = 272  -Right bundle branch block with left axis -bifascicular block.    -Inferior infarct -age undetermined.     Anesthesia Evaluation     .             ROS/MED HX    ENT/Pulmonary:     (+)sleep apnea, uses CPAP , . .    Neurologic:     (+)neuropathy - left leg,     Cardiovascular:     (+) hypertension-Peripheral Vascular Disease ( AAA repair)-- Other, CAD, -CABG-. Taking blood thinners Pt has received instructions: Instructions Given to patient: if INR less than 1.5, will need heparin bridge.  . CHF etiology: diastolic Last EF: 59% . . :. dysrhythmias ( s/p ablation) a-fib, valvular problems/murmurs type: AS and MR s/p avr x 2, MVR x 1:. Previous cardiac testing date:results:Stress Testdate:4/18/19 results:1. Myocardial perfusion imaging using single isotope technique  demonstrated fixed inferior inferior septal defect and a second fixed  apical defect. There is no significant reversibility on this study to  suggest ischemia.   2.  "Gated images demonstrated normal LV cavity size and systolic  function. The left ventricular systolic function is 59%.  3. Compared to the prior study from no prior study .ECG reviewed date: results:RBBB, left fascicular block date: results:          METS/Exercise Tolerance:     Hematologic:     (+) Anemia, -      Musculoskeletal:   (+) arthritis,  -       GI/Hepatic:     (+) GERD Asymptomatic on medication, Other GI/Hepatic diaphragmatic hernia      Renal/Genitourinary:     (+) BPH,       Endo:     (+) Obesity, .      Psychiatric:  - neg psychiatric ROS       Infectious Disease:  - neg infectious disease ROS       Malignancy:   (+) Malignancy History of Prostate  Prostate CA status post Radiation,         Other:                                 Lab Results   Component Value Date    WBC 9.3 11/10/2019    HGB 11.2 (L) 11/10/2019    HCT 35.0 (L) 11/10/2019     11/10/2019     11/10/2019    POTASSIUM 3.9 11/10/2019    CHLORIDE 104 11/10/2019    CO2 28 11/10/2019    BUN 17 11/10/2019    CR 0.94 11/10/2019    GLC 93 11/10/2019    AWILDA 8.8 11/10/2019    PHOS 3.8 10/18/2013    MAG 1.9 05/21/2018    ALBUMIN 4.1 05/30/2019    PROTTOTAL 8.0 05/30/2019    ALT 70 05/30/2019    AST 41 05/30/2019    ALKPHOS 57 05/30/2019    BILITOTAL 0.3 05/30/2019    PTT 37 10/16/2013    INR 1.18 (H) 11/12/2019    FIBR 229 10/16/2013    TSH 0.96 05/08/2018    T4 0.79 11/21/2005       Preop Vitals  BP Readings from Last 3 Encounters:   11/12/19 104/52   10/28/19 112/67   10/25/19 116/71    Pulse Readings from Last 3 Encounters:   11/12/19 73   10/28/19 75   10/25/19 77      Resp Readings from Last 3 Encounters:   11/12/19 16   10/15/19 16   08/29/19 16    SpO2 Readings from Last 3 Encounters:   11/12/19 96%   10/28/19 96%   10/25/19 100%      Temp Readings from Last 1 Encounters:   11/12/19 36.4  C (97.5  F) (Oral)    Ht Readings from Last 1 Encounters:   10/28/19 1.676 m (5' 5.98\")      Wt Readings from Last 1 Encounters:   11/10/19 " "90.5 kg (199 lb 8.3 oz)    Estimated body mass index is 32.22 kg/m  as calculated from the following:    Height as of 10/28/19: 1.676 m (5' 5.98\").    Weight as of this encounter: 90.5 kg (199 lb 8.3 oz).       Anesthesia Plan      History & Physical Review      ASA Status:  3 .    NPO Status:  > 8 hours    Plan for ETT and General with Propofol induction. Maintenance will be Balanced.    PONV prophylaxis:  Dexamethasone or Solumedrol and Ondansetron (or other 5HT-3)       Postoperative Care  Postoperative pain management:  Multi-modal analgesia.      Consents  Anesthetic plan, risks, benefits and alternatives discussed with:  Patient..                 Alicia Walker MD  "

## 2019-11-12 NOTE — PROVIDER NOTIFICATION
9 AM Paged hospitalist if AM lasix should be held pre-op? Also when heparin gtt should be stopped (surgery currently scheduled 1540)    -hold AM lasix  -confirm with surgery team that 4 hrs pre-op is appropriate timing to stop heparin gtt    10 AM spoke w/ Dr Martinez's RN twice. She is currently in surgery, will call back to confirm appropriate heparin gtt stop time.     1120: spoke with Dr Martinez, telephone order with readback to stop heparin gtt at 1140 AM. She will place orders for when to resume anticoagulation post-op.

## 2019-11-12 NOTE — CONSULTS
Consult Date:  2019      UROLOGY INPATIENT CONSULTATION      REASON FOR CONSULTATION:  Difficult Lord catheter placement.      HISTORY OF PRESENT ILLNESS:  I was called to operating room #20 to assist in Lord catheter placement on Liang Farr.  He is a 79-year-old gentleman undergoing gastrocnemius repair by Dr. Martinez.  He has a history of enlarged prostate with no prior history of prostate surgery.      DETAILS OF PROCEDURE:  The patient's penis was prepped and draped in standard sterile fashion.  I inserted a 20-Japanese coude tip Lord catheter without difficulty and clear urine drained from the bladder.      RECOMMENDATIONS:  The patient will have his Lord catheter in place for 2 days after his procedure as is standard after his procedure, and he can have the Lord catheter removed at that time.         BAYLEE NARVAEZ MD             D: 2019   T: 2019   MT:       Name:     LIANG FARR   MRN:      [id number]        Account:       [id number]   :      1940           Consult Date:  2019      Document: [id number]

## 2019-11-12 NOTE — PLAN OF CARE
Vital signs stable on RA. A/Ox4. R knee dressing packed/changed today after patient showered. CDI. LS clear. IS to 2500. BS active. passing gas, bm today. NPO since 6AM ex small sip w/ meds at 9 and 12. Voiding. Pain controlled with PRN norco. up ad claudette. Baseline numbness and tingling bilateral LE. Pulses present w/ doppler--thready at times, MD informed.     Heparin gtt stopped at 1138 per MD orders. Saline locked.     Report given to pre-op RN at 1420, wheeled down by staff. Wife and daughter present.

## 2019-11-13 ENCOUNTER — DOCUMENTATION ONLY (OUTPATIENT)
Dept: SLEEP MEDICINE | Facility: CLINIC | Age: 79
End: 2019-11-13

## 2019-11-13 LAB
ALBUMIN SERPL-MCNC: 2.9 G/DL (ref 3.4–5)
ALP SERPL-CCNC: 57 U/L (ref 40–150)
ALT SERPL W P-5'-P-CCNC: 18 U/L (ref 0–70)
ANION GAP SERPL CALCULATED.3IONS-SCNC: 5 MMOL/L (ref 3–14)
AST SERPL W P-5'-P-CCNC: 22 U/L (ref 0–45)
BASOPHILS # BLD AUTO: 0 10E9/L (ref 0–0.2)
BASOPHILS NFR BLD AUTO: 0.1 %
BILIRUB DIRECT SERPL-MCNC: <0.1 MG/DL (ref 0–0.2)
BILIRUB SERPL-MCNC: 0.3 MG/DL (ref 0.2–1.3)
BUN SERPL-MCNC: 14 MG/DL (ref 7–30)
CALCIUM SERPL-MCNC: 8.8 MG/DL (ref 8.5–10.1)
CHLORIDE SERPL-SCNC: 103 MMOL/L (ref 94–109)
CO2 SERPL-SCNC: 27 MMOL/L (ref 20–32)
CREAT SERPL-MCNC: 0.79 MG/DL (ref 0.66–1.25)
DIFFERENTIAL METHOD BLD: ABNORMAL
EOSINOPHIL # BLD AUTO: 0 10E9/L (ref 0–0.7)
EOSINOPHIL NFR BLD AUTO: 0 %
ERYTHROCYTE [DISTWIDTH] IN BLOOD BY AUTOMATED COUNT: 13.6 % (ref 10–15)
GFR SERPL CREATININE-BSD FRML MDRD: 86 ML/MIN/{1.73_M2}
GLUCOSE SERPL-MCNC: 125 MG/DL (ref 70–99)
HCT VFR BLD AUTO: 32.1 % (ref 40–53)
HGB BLD-MCNC: 10.3 G/DL (ref 13.3–17.7)
IMM GRANULOCYTES # BLD: 0 10E9/L (ref 0–0.4)
IMM GRANULOCYTES NFR BLD: 0.2 %
INR PPP: 1.27 (ref 0.86–1.14)
LMWH PPP CHRO-ACNC: 0.14 IU/ML
LMWH PPP CHRO-ACNC: 0.24 IU/ML
LYMPHOCYTES # BLD AUTO: 1 10E9/L (ref 0.8–5.3)
LYMPHOCYTES NFR BLD AUTO: 7.6 %
MAGNESIUM SERPL-MCNC: 2 MG/DL (ref 1.6–2.3)
MCH RBC QN AUTO: 28.4 PG (ref 26.5–33)
MCHC RBC AUTO-ENTMCNC: 32.1 G/DL (ref 31.5–36.5)
MCV RBC AUTO: 88 FL (ref 78–100)
MONOCYTES # BLD AUTO: 1 10E9/L (ref 0–1.3)
MONOCYTES NFR BLD AUTO: 8 %
NEUTROPHILS # BLD AUTO: 10.7 10E9/L (ref 1.6–8.3)
NEUTROPHILS NFR BLD AUTO: 84.1 %
NRBC # BLD AUTO: 0 10*3/UL
NRBC BLD AUTO-RTO: 0 /100
PHOSPHATE SERPL-MCNC: 3.9 MG/DL (ref 2.5–4.5)
PLATELET # BLD AUTO: 337 10E9/L (ref 150–450)
POTASSIUM SERPL-SCNC: 4.4 MMOL/L (ref 3.4–5.3)
PROT SERPL-MCNC: 6.6 G/DL (ref 6.8–8.8)
RBC # BLD AUTO: 3.63 10E12/L (ref 4.4–5.9)
SODIUM SERPL-SCNC: 135 MMOL/L (ref 133–144)
TSH SERPL DL<=0.005 MIU/L-ACNC: 0.54 MU/L (ref 0.4–4)
WBC # BLD AUTO: 12.7 10E9/L (ref 4–11)

## 2019-11-13 PROCEDURE — 12000000 ZZH R&B MED SURG/OB

## 2019-11-13 PROCEDURE — 85520 HEPARIN ASSAY: CPT | Performed by: HOSPITALIST

## 2019-11-13 PROCEDURE — 85520 HEPARIN ASSAY: CPT | Performed by: PLASTIC SURGERY

## 2019-11-13 PROCEDURE — 85025 COMPLETE CBC W/AUTO DIFF WBC: CPT | Performed by: PLASTIC SURGERY

## 2019-11-13 PROCEDURE — 80048 BASIC METABOLIC PNL TOTAL CA: CPT | Performed by: PLASTIC SURGERY

## 2019-11-13 PROCEDURE — 83735 ASSAY OF MAGNESIUM: CPT | Performed by: PLASTIC SURGERY

## 2019-11-13 PROCEDURE — 36415 COLL VENOUS BLD VENIPUNCTURE: CPT | Performed by: HOSPITALIST

## 2019-11-13 PROCEDURE — 85610 PROTHROMBIN TIME: CPT | Performed by: PLASTIC SURGERY

## 2019-11-13 PROCEDURE — 84100 ASSAY OF PHOSPHORUS: CPT | Performed by: PLASTIC SURGERY

## 2019-11-13 PROCEDURE — 36415 COLL VENOUS BLD VENIPUNCTURE: CPT | Performed by: PLASTIC SURGERY

## 2019-11-13 PROCEDURE — 84443 ASSAY THYROID STIM HORMONE: CPT | Performed by: PLASTIC SURGERY

## 2019-11-13 PROCEDURE — 25000132 ZZH RX MED GY IP 250 OP 250 PS 637: Mod: GY | Performed by: PLASTIC SURGERY

## 2019-11-13 PROCEDURE — 80076 HEPATIC FUNCTION PANEL: CPT | Performed by: PLASTIC SURGERY

## 2019-11-13 PROCEDURE — 99232 SBSQ HOSP IP/OBS MODERATE 35: CPT | Performed by: HOSPITALIST

## 2019-11-13 PROCEDURE — 25000132 ZZH RX MED GY IP 250 OP 250 PS 637: Mod: GY | Performed by: PHYSICIAN ASSISTANT

## 2019-11-13 PROCEDURE — 25000128 H RX IP 250 OP 636: Performed by: PLASTIC SURGERY

## 2019-11-13 RX ORDER — WARFARIN SODIUM 5 MG/1
5 TABLET ORAL
Status: COMPLETED | OUTPATIENT
Start: 2019-11-13 | End: 2019-11-13

## 2019-11-13 RX ADMIN — HYDROCODONE BITARTRATE AND ACETAMINOPHEN 2 TABLET: 5; 325 TABLET ORAL at 03:30

## 2019-11-13 RX ADMIN — ROSUVASTATIN CALCIUM 40 MG: 20 TABLET, FILM COATED ORAL at 20:13

## 2019-11-13 RX ADMIN — HEPARIN SODIUM 1200 UNITS/HR: 10000 INJECTION, SOLUTION INTRAVENOUS at 01:03

## 2019-11-13 RX ADMIN — EZETIMIBE 10 MG: 10 TABLET ORAL at 20:13

## 2019-11-13 RX ADMIN — FERROUS SULFATE TAB 325 MG (65 MG ELEMENTAL FE) 325 MG: 325 (65 FE) TAB at 08:33

## 2019-11-13 RX ADMIN — MESALAMINE 800 MG: 800 TABLET, DELAYED RELEASE ORAL at 20:13

## 2019-11-13 RX ADMIN — CEPHALEXIN 250 MG: 250 CAPSULE ORAL at 12:41

## 2019-11-13 RX ADMIN — TAMSULOSIN HYDROCHLORIDE 0.4 MG: 0.4 CAPSULE ORAL at 20:13

## 2019-11-13 RX ADMIN — HYDROCODONE BITARTRATE AND ACETAMINOPHEN 2 TABLET: 5; 325 TABLET ORAL at 12:41

## 2019-11-13 RX ADMIN — WARFARIN SODIUM 5 MG: 5 TABLET ORAL at 17:43

## 2019-11-13 RX ADMIN — MESALAMINE 800 MG: 800 TABLET, DELAYED RELEASE ORAL at 08:34

## 2019-11-13 RX ADMIN — CEPHALEXIN 250 MG: 250 CAPSULE ORAL at 06:10

## 2019-11-13 RX ADMIN — HEPARIN SODIUM 1200 UNITS/HR: 10000 INJECTION, SOLUTION INTRAVENOUS at 03:30

## 2019-11-13 RX ADMIN — HYDROCODONE BITARTRATE AND ACETAMINOPHEN 2 TABLET: 5; 325 TABLET ORAL at 08:33

## 2019-11-13 RX ADMIN — CEPHALEXIN 250 MG: 250 CAPSULE ORAL at 23:28

## 2019-11-13 RX ADMIN — CEPHALEXIN 250 MG: 250 CAPSULE ORAL at 17:43

## 2019-11-13 RX ADMIN — HYDROCODONE BITARTRATE AND ACETAMINOPHEN 2 TABLET: 5; 325 TABLET ORAL at 20:13

## 2019-11-13 RX ADMIN — HEPARIN SODIUM 1350 UNITS/HR: 10000 INJECTION, SOLUTION INTRAVENOUS at 23:29

## 2019-11-13 ASSESSMENT — ACTIVITIES OF DAILY LIVING (ADL)
ADLS_ACUITY_SCORE: 13
ADLS_ACUITY_SCORE: 14

## 2019-11-13 NOTE — PROGRESS NOTES
[facility]  Hospitalist Progress Note   11/13/2019          Assessment and Plan:       Fausto Farr is a 79-year-old male with medical history including obesity; ulcerative colitis; CAD; HTN; afib/flutter; and mechanical AVR and MVR on warfarin; who has had a delayed healing with persistent wound and tendon visible following a total knee arthroplasty (7/2019) that required surgery. Admitted 11/10/2019 for bridging anticoagulation prior to surgery (planned for 11/12).     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    * Had R TKA 7/22/2019. Later suffered wound breakdown, right knee, and underwent I&D and secondary wound closure 8/29/2019. Developed non-healing wound with visible tendon and referred to Plastics.   PTA on cefadroxil.  Underwent surgical flap with skin graft  11/12/2019 by Plastic Surgery.    Has been continued on cephalexin (started 11/10 as formulary alternative to cefadroxil which pt was on   PTA. Currently on bedrest.  Defer Postoperative care including pain management, pharmacological DVT prophylaxis, antibiotics, rehabilitation to surgical team.    Atrial fibrillation and atrial flutter, status post ablation (4/2019).  Asymptomatic bradycardia postoperatively.  Post procedure patient on bedrest, receiving Norco for pain.  Heart rate in 40s and 50s.  Denies any chest pain or shortness of breath or dizziness or headache or palpitation.  Preop EKG A. fib with left axis deviation, right bundle branch block.  TSH 0.54.  Magnesium 2.0.  We will start on telemetry monitoring.  Continue PTA metoprolol with hold parameters.  Anticoagulation management as below.  If patient symptomatic will consider EKG, troponin check.     S/p mechanical aortic and mitral valve replacements.  * Originally had AVR (along with CABG) 2006. Underwent redo sternotomy with mechanical AVR and MVR 2013. Historically on chronic anticoagulation with  warfarin with a goal INR of 2.5 to 3.5.   Had been off warfarin since Friday, 11/08/2019 in anticipation of needing bridging for surgery.   * INR 1.65 on admit 11/10. Started on bridging heparin gtt procedure on 10/12.  Restart warfarin when able post-surgery per Plastics.     CAD.  Hypertension (benign essential).  [PTA: furosemide 20 mg daily; metoprolol 25 mg BID.]  *S/p CABG (along with AVR) in 2006. Nuc stress 4/2019 showed no ischemia, LVEF 59%.  - Continue metoprolol, rosuvastatin.  - Hold furosemide for now.   - Monitor i/o's, daily wts.     Anemia, suspect chronic component.  Hgb 11.2 on admit 11/10. No overt clinical signs of major bleeding.   - Consider prbc transfusion if hgb </= 7.0 or if significant bleeding with hemodynamic instability or if symptomatic.    PAD.  H/o AAA stent repair.  - Continue rosuvastatin.     GAGE.  - Continue CPAP.     HLD.  - Continue ezetimibe and rosuvastatin.     Benign prostatic hypertrophy.    - Continue prior to admission tamsulosin.      Orders Placed This Encounter      Advance Diet as Tolerated: Regular Diet Adult      DVT Prophylaxis:  PCD's, ambulation. On heparin gtt.  Code Status: Full Code  Disposition: Expected discharge per surgical team.    Discussed with patient, bedside RN    All Cooley MD        Interval History:      Lying in bed, appears comfortable.  Currently on bedrest.  Has been taking Norco as needed for pain.  Transition to IV heparin post procedure for anticoagulation.  Denies any chest pain or shortness of breath.  No palpitations, no headache or dizziness.  No nausea vomiting.  Has been having heart rate in the 40s to 50s on rest.       Physical Exam:        Physical Exam   Temp:  [97.2  F (36.2  C)-98  F (36.7  C)] 98  F (36.7  C)  Pulse:  [47-75] 47  Heart Rate:  [47-75] 48  Resp:  [10-20] 18  BP: ()/(49-67) 107/54  SpO2:  [94 %-100 %] 95 %    Intake/Output Summary (Last 24 hours) at 11/13/2019 1100  Last data filed at 11/13/2019  0700  Gross per 24 hour   Intake 2650 ml   Output 860 ml   Net 1790 ml       Admission Weight: 90.5 kg (199 lb 8.3 oz)  Current Weight: 90.5 kg (199 lb 8.3 oz)    PHYSICAL EXAM  GENERAL: Patient is in no distress. Alert and oriented.  HEART: irregular rate and rhythm. S1S2.  Systolic murmur right sternal border.  LUNGS: bilateral decreased breath sounds, no wheezing no crackles.  NEURO: Moving all extremities.  EXTREMITIES: trace pedal edema.   SKIN: Warm, dry.   PSYCHIATRY Cooperative       Medications:          cephALEXin  250 mg Oral Q6H LETITIA     ezetimibe  10 mg Oral QPM     ferrous sulfate  325 mg Oral Daily with breakfast     mesalamine  800 mg Oral BID     metoprolol tartrate  25 mg Oral BID     rosuvastatin  40 mg Oral QPM     sodium chloride (PF)  3 mL Intracatheter Q8H     tamsulosin  0.4 mg Oral QPM     acetaminophen, sore throat lozenge, HYDROcodone-acetaminophen, lidocaine 4%, lidocaine (buffered or not buffered), melatonin, methocarbamol, naloxone, ondansetron **OR** ondansetron, prochlorperazine **OR** prochlorperazine **OR** prochlorperazine, sodium chloride (PF)         Data:      All new lab and imaging data was reviewed.

## 2019-11-13 NOTE — ANESTHESIA CARE TRANSFER NOTE
Patient: Fausto Alli Yordan    Procedure(s):  RIGHT GASTRONEMIUS FLAP TO RIGHT KNEE, SPLIT THICKNESS SKIN GRAFT FROM RIGHT THIGH TO RIGHT KNEE, SURGICAL PROCUREMENT, FLAP, PEDICLE, EXTREMITY, WOUND VAC PLACEMENT  APPLICATION, WOUND VAC  SURGICAL PROCUREMENT, FLAP, PEDICLE, EXTREMITY    Diagnosis: * No pre-op diagnosis entered *  Diagnosis Additional Information: No value filed.    Anesthesia Type:   ETT, General     Note:  Airway :Nasal Cannula  Patient transferred to:PACU  Handoff Report: Identifed the Patient, Identified the Reponsible Provider, Reviewed the pertinent medical history, Discussed the surgical course, Reviewed Intra-OP anesthesia mangement and issues during anesthesia, Set expectations for post-procedure period and Allowed opportunity for questions and acknowledgement of understanding      Vitals: (Last set prior to Anesthesia Care Transfer)    CRNA VITALS  11/12/2019 1902 - 11/12/2019 1941      11/12/2019             Pulse:  51    SpO2:  96 %    Resp Rate (set):  10                Electronically Signed By: ADELE Hui CRNA  November 12, 2019  7:41 PM

## 2019-11-13 NOTE — PROVIDER NOTIFICATION
0900 delayed entry: spoke w/ Dr Martinez and text paged hospitalist to update: HR has been 40s-50s overnight and this morning even when awake. Hold parameters for metoprolol? Want telemetry monitoring? Patient denies any dizziness, SOB, chest pain.     Received hold parameters for metoprolol (hold if SBP <100 or HR < 55). Held AM metoprolol based on these parameters. Telemetry shows sinus bradycardia with 1 AVB and BBB.

## 2019-11-13 NOTE — BRIEF OP NOTE
[facility]    Brief Operative Note    Pre-operative diagnosis: * No pre-op diagnosis entered *  Post-operative diagnosis open wound right knee    Procedure: Procedure(s):  RIGHT GASTRONEMIUS FLAP TO RIGHT KNEE, SPLIT THICKNESS SKIN GRAFT FROM RIGHT THIGH TO RIGHT KNEE, wOUND VAC PLACEMENT    Surgeon: Surgeon(s) and Role:     * Sara Martinez MD - Primary  Anesthesia: General   Estimated blood loss: Less than 50 ml  Drains: Matteo-Pate  Specimens: * No specimens in log *  Findings:   None.  Complications: None.  Implants: * No implants in log *     May resume heparin at 0100 if no active bleeding.

## 2019-11-13 NOTE — ANESTHESIA POSTPROCEDURE EVALUATION
Patient: Fausto Alli Yordan    Procedure(s):  RIGHT GASTRONEMIUS FLAP TO RIGHT KNEE, SPLIT THICKNESS SKIN GRAFT FROM RIGHT THIGH TO RIGHT KNEE, SURGICAL PROCUREMENT, FLAP, PEDICLE, EXTREMITY, WOUND VAC PLACEMENT  APPLICATION, WOUND VAC  SURGICAL PROCUREMENT, FLAP, PEDICLE, EXTREMITY    Diagnosis:* No pre-op diagnosis entered *  Diagnosis Additional Information: No value filed.    Anesthesia Type:  ETT, General    Note:  Anesthesia Post Evaluation    Patient location during evaluation: PACU  Patient participation: Able to fully participate in evaluation  Level of consciousness: awake  Pain management: adequate  Airway patency: patent  Cardiovascular status: acceptable  Respiratory status: acceptable  Hydration status: acceptable  PONV: none             Last vitals:  Vitals:    11/12/19 1940 11/12/19 1949 11/12/19 1950   BP:   94/67   Pulse:   75   Resp: 16 16 10   Temp:      SpO2:   97%         Electronically Signed By: Xiang Smith MD  November 12, 2019  8:06 PM

## 2019-11-13 NOTE — PHARMACY-ANTICOAGULATION SERVICE
Clinical Pharmacy - Warfarin Dosing Consult     Pharmacy has been consulted to manage this patient s warfarin therapy.  Indication: Mechanical Aortic Valve Replacement;Mechanical Mitral Valve Replacement  Therapy Goal: INR 2.5-3.5  Warfarin Prior to Admission: Yes  Warfarin PTA Regimen: 5 mg on MWF, 7.5 mg on all other days    INR   Date Value Ref Range Status   11/13/2019 1.27 (H) 0.86 - 1.14 Final   11/12/2019 1.18 (H) 0.86 - 1.14 Final       Recommend warfarin 5 mg today.  Pharmacy will monitor Fausto Farr daily and order warfarin doses to achieve specified goal.      Please contact pharmacy as soon as possible if the warfarin needs to be held for a procedure or if the warfarin goals change.

## 2019-11-13 NOTE — PLAN OF CARE
A&O x4. VSS on RA except bradycardic at times. CMS intact. Lungs clear. BS+, BM-, flatus-. Incisions CDI, graft site covered w/ Tegaderm. Tolerating regular diet. Denies N/V. Lord patent w/ AUO. [place] for pain. Bedrest. Knee immobilizer in place. RON stripped per orders.

## 2019-11-13 NOTE — PROGRESS NOTES
Plastic Surgery    Patient doing well. Good pain control. No nausea.    PE: Temp: 97.8  F (36.6  C) Temp src: Oral BP: 104/51 Pulse: (!) 47 Heart Rate: (!) 45 Resp: 18 SpO2: 90 % O2 Device: None (Room air) Oxygen Delivery: 1 LPM    Wound VAC in place; right thigh donor site as expected; no identified bleeding    A/P:  Continue bedrest until Friday. May resume coumadin today. Anticipate discharge over week-end or when INR therapeutic.    Sara Martinez MD  Plastic Surgery  [facility]  [phone] (office)

## 2019-11-13 NOTE — PROGRESS NOTES
STM recheck per provider request        Message left for patient to return call     Assessment: Pt not meeting objective benchmarks for leak.  His usage has increased since his follow up visit with sleep provider.     Action plan: waiting for patient to return call.            PAP settings: CPAP min 6.0 cm  H20       CPAP max 16.0 cm  H20      95th% pressure 9 cm  H20     Mask type:  Nasal Pillows    Objective measures: 14 day rolling measures      Compliance  92 %      Leak  39.6 lpm  last  upload      AHI 2.29   last  upload      Average number of minutes 428      Objective measure goal  Compliance   Goal >70%  Leak   Goal < 24 lpm  AHI  Goal < 5  Usage  Goal >240      Total time spent on remote patient monitoring data analysis and patient contact today:   10  minutes

## 2019-11-13 NOTE — PLAN OF CARE
Dx: Chronic wound of right knee Hx: Total knee arthroplasty 7/22/19, HTN, MVR, CAD, A-fib/flutter VSS on 1L. A/Ox4. Incisions on right knee w/ wound vac. CMS intact. Pain controlled with PRN [place]helle. Bedrest. Tolerating reg diet. Denied N&V. +gas, No bm on shift. Lord. LS clear. Leg brace on left leg. RON drain on left side near knee.

## 2019-11-14 LAB
GLUCOSE SERPL-MCNC: 104 MG/DL (ref 70–99)
INR PPP: 1.21 (ref 0.86–1.14)
INTERPRETATION ECG - MUSE: NORMAL
LMWH PPP CHRO-ACNC: 0.22 IU/ML

## 2019-11-14 PROCEDURE — 36415 COLL VENOUS BLD VENIPUNCTURE: CPT | Performed by: PLASTIC SURGERY

## 2019-11-14 PROCEDURE — 25000132 ZZH RX MED GY IP 250 OP 250 PS 637: Mod: GY | Performed by: HOSPITALIST

## 2019-11-14 PROCEDURE — 85610 PROTHROMBIN TIME: CPT | Performed by: PLASTIC SURGERY

## 2019-11-14 PROCEDURE — 85520 HEPARIN ASSAY: CPT | Performed by: PLASTIC SURGERY

## 2019-11-14 PROCEDURE — 99232 SBSQ HOSP IP/OBS MODERATE 35: CPT | Performed by: HOSPITALIST

## 2019-11-14 PROCEDURE — 25000128 H RX IP 250 OP 636: Performed by: PLASTIC SURGERY

## 2019-11-14 PROCEDURE — 82947 ASSAY GLUCOSE BLOOD QUANT: CPT | Performed by: PLASTIC SURGERY

## 2019-11-14 PROCEDURE — 12000000 ZZH R&B MED SURG/OB

## 2019-11-14 PROCEDURE — 25000132 ZZH RX MED GY IP 250 OP 250 PS 637: Mod: GY | Performed by: PHYSICIAN ASSISTANT

## 2019-11-14 PROCEDURE — 25000132 ZZH RX MED GY IP 250 OP 250 PS 637: Mod: GY | Performed by: PLASTIC SURGERY

## 2019-11-14 RX ORDER — WARFARIN SODIUM 7.5 MG/1
7.5 TABLET ORAL
Status: COMPLETED | OUTPATIENT
Start: 2019-11-14 | End: 2019-11-14

## 2019-11-14 RX ADMIN — HYDROCODONE BITARTRATE AND ACETAMINOPHEN 2 TABLET: 5; 325 TABLET ORAL at 23:41

## 2019-11-14 RX ADMIN — HEPARIN SODIUM 1350 UNITS/HR: 10000 INJECTION, SOLUTION INTRAVENOUS at 17:43

## 2019-11-14 RX ADMIN — HYDROCODONE BITARTRATE AND ACETAMINOPHEN 2 TABLET: 5; 325 TABLET ORAL at 05:43

## 2019-11-14 RX ADMIN — TAMSULOSIN HYDROCHLORIDE 0.4 MG: 0.4 CAPSULE ORAL at 20:11

## 2019-11-14 RX ADMIN — ROSUVASTATIN CALCIUM 40 MG: 20 TABLET, FILM COATED ORAL at 20:11

## 2019-11-14 RX ADMIN — CEPHALEXIN 250 MG: 250 CAPSULE ORAL at 11:48

## 2019-11-14 RX ADMIN — EZETIMIBE 10 MG: 10 TABLET ORAL at 20:11

## 2019-11-14 RX ADMIN — METOPROLOL TARTRATE 25 MG: 25 TABLET ORAL at 20:11

## 2019-11-14 RX ADMIN — CEPHALEXIN 250 MG: 250 CAPSULE ORAL at 23:29

## 2019-11-14 RX ADMIN — HYDROCODONE BITARTRATE AND ACETAMINOPHEN 2 TABLET: 5; 325 TABLET ORAL at 11:48

## 2019-11-14 RX ADMIN — MESALAMINE 800 MG: 800 TABLET, DELAYED RELEASE ORAL at 09:38

## 2019-11-14 RX ADMIN — WARFARIN SODIUM 7.5 MG: 7.5 TABLET ORAL at 17:41

## 2019-11-14 RX ADMIN — CEPHALEXIN 250 MG: 250 CAPSULE ORAL at 17:41

## 2019-11-14 RX ADMIN — CEPHALEXIN 250 MG: 250 CAPSULE ORAL at 05:43

## 2019-11-14 RX ADMIN — MESALAMINE 800 MG: 800 TABLET, DELAYED RELEASE ORAL at 20:11

## 2019-11-14 RX ADMIN — HYDROCODONE BITARTRATE AND ACETAMINOPHEN 2 TABLET: 5; 325 TABLET ORAL at 19:36

## 2019-11-14 RX ADMIN — FERROUS SULFATE TAB 325 MG (65 MG ELEMENTAL FE) 325 MG: 325 (65 FE) TAB at 09:37

## 2019-11-14 ASSESSMENT — ACTIVITIES OF DAILY LIVING (ADL)
ADLS_ACUITY_SCORE: 15
ADLS_ACUITY_SCORE: 13
ADLS_ACUITY_SCORE: 15
ADLS_ACUITY_SCORE: 13

## 2019-11-14 NOTE — PROGRESS NOTES
SW:  acknowledges consult. Awaiting Physical Therapy recommendations. SW follow and assist with discharge needs.

## 2019-11-14 NOTE — PROGRESS NOTES
Spoke to pt about nutrition/protein needed for healing. Pt states he does not want to have to use the bedpan and therefore does not want to have a BM while on bedrest.    Boost shake ordered for patient between meals as he only ordered coffee/jello and rice krispy bars for lunch.

## 2019-11-14 NOTE — PROGRESS NOTES
Plastic Surgery    Patient doing well. No complaints other than pain over calf.    PE: Temp: 97.7  F (36.5  C) Temp src: Oral BP: 110/62 Pulse: (!) 49 Heart Rate: 76 Resp: 16 SpO2: 96 % O2 Device: None (Room air)      Donor site and wound VAC with no signs of bleeding.    A/P:  Continue present cares. Bedrest until tomorrow, then PT to assist with ambulation. Continue coumadin/heparin conversion. Plan wound VAC removal on Saturday. Anticipate discharge over week-end if INR acceptable.    Sara Martinez MD  Plastic Surgery  [facility]  [phone] (office)

## 2019-11-14 NOTE — OP NOTE
PLASTIC SURGERY OPERATIVE NOTE  Patient Name:  Fausto Farr     Date of Service:  11/10/2019 - 11/12/2019     PREOPERATIVE DIAGNOSES:   1.  Open wound right knee status post total knee arthroplasty    POSTOPERATIVE DIAGNOSES:   1.  Open wound right knee status post total knee arthroplasty    SURGEON:  Sara Martinez MD   OR STAFF:  Circulator: Maria Luz Kurtz RN  Relief Circulator: Angelika Colon RN  Scrub Person: Ashley Victor     ANESTHESIA:  General     ESTIMATED BLOOD LOSS:  * No values recorded between 11/12/2019  4:35 PM and 11/12/2019  7:33 PM *    SPECIMEN(S):  * No specimens in log *     PROCEDURES:   1.  Right gastrocnemius muscle transfer to right knee   2.  Split-thickness skin graft from right thigh to right knee, 10 x 6 cm.      DESCRIPTION OF PROCEDURE:  Patient was marked for incisions and taken to the operating room.  General anesthesia was administered.  A Lord catheter was placed due to the length of the procedure.  However, there is difficulty placing the catheter and so urology consult was obtained.  Dr. Trevizo then place a Lord catheter and will dictate a separate consult.    The right leg was then prepped and draped in a sterile manner.  An incision was made to the previous knee incision and dissection was carried down to underlying soft tissue.  The skin flaps were elevated both medial and laterally to allow for rotation of the gastroc muscle.  The patellar tendon was easily visualized and showed no acute signs of infection.  A Ioban dressing was then placed over the open wound.    Patient was then turned to the prone position and the right leg was prepped and draped in a sterile manner.  An incision was made along the posterior aspect of the leg and dissection was carried down to underlying gastrocnemius muscle.  The medial head was identified and freed from overlying subcutaneous tissue as well as the underlying soleus.  Dissection was carried distally to the  Achilles tendon and then the muscle was transected.  Proximally, the dissection was carried up to the origin of the muscle.  This allowed for easy rotation of the muscle into the anterior space.    Prior to that maneuver, the overlying fascia muscle was scored and parts of it was removed to allow for extension of the muscle.  The muscle was then rotated to the anterior aspect of the knee through a tunnel.  Hemostasis was achieved.    The posterior leg incision was closed with interrupted 2-0 PDS to reapproximate the superficial fascia.  A 10 Hebrew RON drain was placed.  The skin incision was closed with interrupted 3-0 Monocryl in the subcutaneous tissue followed by running 4-0 subcuticular Monocryl suture.  In the popliteal area, the incision was closed with interrupted 4-0 nylon suture.  A Tegaderm dressing was placed over the wound.    Patient was then turned to the supine position in the right leg was then prepped and draped in a sterile manner.  The gastrocnemius muscle was identified and then mobilized to cover the patella tendon.  This was secured with retention sutures of 2-0 Prolene placed laterally.  Additional fascia overlying the muscle was removed.  Hemostasis was achieved.  The skin edges of the soft tissue was secured to the gastrocnemius muscle using interrupted 4-0 chromic suture.    Skin was then harvested from the right thigh using a Sylvia dermatome at a depth of 15/1000 of an inch.  The harvested skin was then meshed 1.5-1.  The mesh skin was then placed over the defect and secured with interrupted 4-0 chromic sutures.    The donor site was closed with a Tegaderm dressing.  A wound VAC was placed over the gastrocnemius muscle and skin graft.  A knee immobilizer was then placed.  IMPLANTS:  * No implants in log *    FINDINGS:  Exposed patellar tendon covered with rotated medial head of the gastrocnemius muscle.    Sara Martinez MD  Plastic Surgery  [facility]  [phone]  (office)

## 2019-11-14 NOTE — PROGRESS NOTES
[facility]  Hospitalist Progress Note   11/14/2019          Assessment and Plan:       Fausto Farr is a 79-year-old male with medical history including obesity; ulcerative colitis; CAD; HTN; afib/flutter; and mechanical AVR and MVR on warfarin; who has had a delayed healing with persistent wound and tendon visible following a total knee arthroplasty (7/2019) that required surgery. Admitted 11/10/2019 for bridging anticoagulation prior to surgery (planned for 11/12).     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    Had R TKA 7/22/2019. Later suffered wound breakdown, right knee, and underwent I&D and secondary wound closure 8/29/2019. Developed non-healing wound with visible tendon and referred to Plastics.   PTA on cefadroxil.  Underwent surgical flap with skin graft  11/12/2019 by Plastic Surgery.    Has been continued on cephalexin (started 11/10 as formulary alternative to cefadroxil which pt was on ). Would defer to primary team for antibiotic.  No intraoperative cultures noted.  PTA. Currently on bedrest.  Defer Postoperative care including pain management, pharmacological DVT prophylaxis, antibiotics, rehabilitation to surgical team.    Atrial fibrillation and atrial flutter, status post ablation (4/2019).  Asymptomatic bradycardia postoperatively.  Post procedure patient on bedrest, receiving Norco for pain.  Heart rate in 40s and 50s.  Denies any chest pain or shortness of breath or dizziness or headache or palpitation.  Preop EKG A. fib with left axis deviation, right bundle branch block.  TSH 0.54.  Magnesium 2.0.  Continue telemetry monitoring.  Continue PTA metoprolol with hold parameters.  Anticoagulation management as below.  If patient symptomatic will consider EKG, troponin check.     S/p mechanical aortic and mitral valve replacements.  Originally had AVR (along with CABG) 2006. Underwent redo sternotomy with mechanical  AVR and MVR 2013. Historically on chronic anticoagulation with warfarin with a goal INR of 2.5 to 3.5.   Had been off warfarin since Friday, 11/08/2019 in anticipation of needing bridging for surgery.   INR 1.65 on admit 11/10. Started on bridging heparin gtt procedure on 10/12.  Restarted warfarin 11/13 post-surgery per Plastics.  Pharmacy assisting with dosing.       CAD.  Hypertension (benign essential).  [PTA: furosemide 20 mg daily; metoprolol 25 mg BID.]  S/p CABG (along with AVR) in 2006. Nuc stress 4/2019 showed no ischemia, LVEF 59%.  Continue metoprolol, rosuvastatin.  Hold furosemide for now, will restart a.m.  Monitor i/o's, daily wts.     Anemia, suspect chronic component.  Hgb 11.2 on admit 11/10. No overt clinical signs of major bleeding.  CBC a.m.  Consider prbc transfusion if hgb </= 7.0 or if significant bleeding with hemodynamic instability or if symptomatic.    PAD.  H/o AAA stent repair.  Continue rosuvastatin.    Obesity with a BMI of 32.22.  GAGE.  Consider Lifestyle modification with diet and exercise as able to.  Continue CPAP.     HLD.  Continue ezetimibe and rosuvastatin.     Benign prostatic hypertrophy.    Continue prior to admission tamsulosin.      Orders Placed This Encounter      Advance Diet as Tolerated: Regular Diet Adult      DVT Prophylaxis:  PCD's, ambulation. On heparin gtt and coumadin  Code Status: Full Code  Disposition: Expected discharge per surgical team.    Discussed with patient, bedside RN    All Cooley MD        Interval History:      Lying in bed, appears comfortable.  Currently on bedrest.  Has been taking Norco as needed for pain.  IV heparin, Coumadin  Denies any chest pain or shortness of breath.  No palpitations, no headache or dizziness.  No nausea vomiting.  Has been having heart rate in the 40s to 50s on rest.         Physical Exam:        Physical Exam   Temp:  [97.7  F (36.5  C)-98.5  F (36.9  C)] 98  F (36.7  C)  Pulse:  [47-60] 60  Heart Rate:   [44-76] 68  Resp:  [16-18] 18  BP: ()/(51-62) 110/60  SpO2:  [90 %-98 %] 98 %    Intake/Output Summary (Last 24 hours) at 11/13/2019 1105  Last data filed at 11/13/2019 0700  Gross per 24 hour   Intake 2650 ml   Output 860 ml   Net 1790 ml       Admission Weight: 90.5 kg (199 lb 8.3 oz)  Current Weight: 90.5 kg (199 lb 8.3 oz)    PHYSICAL EXAM  GENERAL: Patient is in no distress. Alert and oriented.  HEART: irregular rate and rhythm. S1S2.  Systolic murmur right sternal border.  LUNGS: bilateral decreased breath sounds, no wheezing no crackles.  NEURO: Moving all extremities.  EXTREMITIES: trace pedal edema.   SKIN: Warm, dry.   PSYCHIATRY Cooperative       Medications:          cephALEXin  250 mg Oral Q6H LETITIA     ezetimibe  10 mg Oral QPM     ferrous sulfate  325 mg Oral Daily with breakfast     mesalamine  800 mg Oral BID     metoprolol tartrate  25 mg Oral BID     rosuvastatin  40 mg Oral QPM     sodium chloride (PF)  3 mL Intracatheter Q8H     tamsulosin  0.4 mg Oral QPM     acetaminophen, sore throat lozenge, HYDROcodone-acetaminophen, lidocaine 4%, lidocaine (buffered or not buffered), melatonin, methocarbamol, naloxone, ondansetron **OR** ondansetron, prochlorperazine **OR** prochlorperazine **OR** prochlorperazine, sodium chloride (PF), Warfarin Therapy Reminder         Data:      All new lab and imaging data was reviewed.

## 2019-11-14 NOTE — PLAN OF CARE
VSS except continues to be bradycardic, asymptomatic. Small amount of drainage to right thigh unchanged since shift change.  Knee immobilizer in place.  Wound vac.  RON drain, stripped.  Lord patent, good urine output.  Declined pain meds this shift.  Bedrest until Friday.  Turned/repo'ed.  Coumadin started this evening.

## 2019-11-14 NOTE — PLAN OF CARE
A&O x4. VSS on RA except bradycardic at times. Tele sinus lashae w/ 1st degree AVB and BBB w/ PVC's. CMS intact. Lungs clear. BS+, BM-, flatus-. Incisions CDI, graft site covered w/ Tegaderm. Tolerating regular diet. Denies N/V. Lord patent w/ AUO. [place] for pain. Bedrest. Knee immobilizer in place. RON stripped per orders.

## 2019-11-15 ENCOUNTER — APPOINTMENT (OUTPATIENT)
Dept: PHYSICAL THERAPY | Facility: CLINIC | Age: 79
DRG: 904 | End: 2019-11-15
Attending: PLASTIC SURGERY
Payer: MEDICARE

## 2019-11-15 LAB
ANION GAP SERPL CALCULATED.3IONS-SCNC: 5 MMOL/L (ref 3–14)
BUN SERPL-MCNC: 14 MG/DL (ref 7–30)
CALCIUM SERPL-MCNC: 8.6 MG/DL (ref 8.5–10.1)
CHLORIDE SERPL-SCNC: 102 MMOL/L (ref 94–109)
CO2 SERPL-SCNC: 30 MMOL/L (ref 20–32)
CREAT SERPL-MCNC: 0.85 MG/DL (ref 0.66–1.25)
ERYTHROCYTE [DISTWIDTH] IN BLOOD BY AUTOMATED COUNT: 14.2 % (ref 10–15)
GFR SERPL CREATININE-BSD FRML MDRD: 83 ML/MIN/{1.73_M2}
GLUCOSE SERPL-MCNC: 139 MG/DL (ref 70–99)
HCT VFR BLD AUTO: 33.7 % (ref 40–53)
HGB BLD-MCNC: 10.6 G/DL (ref 13.3–17.7)
INR PPP: 1.23 (ref 0.86–1.14)
LMWH PPP CHRO-ACNC: 0.24 IU/ML
MCH RBC QN AUTO: 28.3 PG (ref 26.5–33)
MCHC RBC AUTO-ENTMCNC: 31.5 G/DL (ref 31.5–36.5)
MCV RBC AUTO: 90 FL (ref 78–100)
PLATELET # BLD AUTO: 335 10E9/L (ref 150–450)
POTASSIUM SERPL-SCNC: 3.9 MMOL/L (ref 3.4–5.3)
RBC # BLD AUTO: 3.74 10E12/L (ref 4.4–5.9)
SODIUM SERPL-SCNC: 137 MMOL/L (ref 133–144)
WBC # BLD AUTO: 12 10E9/L (ref 4–11)

## 2019-11-15 PROCEDURE — 85520 HEPARIN ASSAY: CPT | Performed by: HOSPITALIST

## 2019-11-15 PROCEDURE — 25000132 ZZH RX MED GY IP 250 OP 250 PS 637: Mod: GY | Performed by: PLASTIC SURGERY

## 2019-11-15 PROCEDURE — 85027 COMPLETE CBC AUTOMATED: CPT | Performed by: HOSPITALIST

## 2019-11-15 PROCEDURE — 85610 PROTHROMBIN TIME: CPT | Performed by: HOSPITALIST

## 2019-11-15 PROCEDURE — 12000000 ZZH R&B MED SURG/OB

## 2019-11-15 PROCEDURE — 25000132 ZZH RX MED GY IP 250 OP 250 PS 637: Mod: GY | Performed by: HOSPITALIST

## 2019-11-15 PROCEDURE — 36415 COLL VENOUS BLD VENIPUNCTURE: CPT | Performed by: HOSPITALIST

## 2019-11-15 PROCEDURE — 99233 SBSQ HOSP IP/OBS HIGH 50: CPT | Performed by: HOSPITALIST

## 2019-11-15 PROCEDURE — 97161 PT EVAL LOW COMPLEX 20 MIN: CPT | Mod: GP | Performed by: PHYSICAL THERAPIST

## 2019-11-15 PROCEDURE — 80048 BASIC METABOLIC PNL TOTAL CA: CPT | Performed by: HOSPITALIST

## 2019-11-15 PROCEDURE — 25000132 ZZH RX MED GY IP 250 OP 250 PS 637: Mod: GY | Performed by: PHYSICIAN ASSISTANT

## 2019-11-15 PROCEDURE — 97116 GAIT TRAINING THERAPY: CPT | Mod: GP | Performed by: PHYSICAL THERAPIST

## 2019-11-15 PROCEDURE — 99207 ZZC CDG-MDM COMPONENT: MEETS MODERATE - UP CODED: CPT | Performed by: HOSPITALIST

## 2019-11-15 PROCEDURE — 97530 THERAPEUTIC ACTIVITIES: CPT | Mod: GP | Performed by: PHYSICAL THERAPIST

## 2019-11-15 PROCEDURE — 25000128 H RX IP 250 OP 636: Performed by: PLASTIC SURGERY

## 2019-11-15 RX ORDER — MULTIPLE VITAMINS W/ MINERALS TAB 9MG-400MCG
1 TAB ORAL DAILY
Status: DISCONTINUED | OUTPATIENT
Start: 2019-11-15 | End: 2019-11-19 | Stop reason: HOSPADM

## 2019-11-15 RX ORDER — WARFARIN SODIUM 5 MG/1
10 TABLET ORAL
Status: COMPLETED | OUTPATIENT
Start: 2019-11-15 | End: 2019-11-15

## 2019-11-15 RX ADMIN — CEPHALEXIN 250 MG: 250 CAPSULE ORAL at 05:44

## 2019-11-15 RX ADMIN — MULTIPLE VITAMINS W/ MINERALS TAB 1 TABLET: TAB at 14:07

## 2019-11-15 RX ADMIN — HEPARIN SODIUM 1350 UNITS/HR: 10000 INJECTION, SOLUTION INTRAVENOUS at 14:02

## 2019-11-15 RX ADMIN — HYDROCODONE BITARTRATE AND ACETAMINOPHEN 2 TABLET: 5; 325 TABLET ORAL at 15:43

## 2019-11-15 RX ADMIN — MESALAMINE 800 MG: 800 TABLET, DELAYED RELEASE ORAL at 22:34

## 2019-11-15 RX ADMIN — TAMSULOSIN HYDROCHLORIDE 0.4 MG: 0.4 CAPSULE ORAL at 19:20

## 2019-11-15 RX ADMIN — HYDROCODONE BITARTRATE AND ACETAMINOPHEN 2 TABLET: 5; 325 TABLET ORAL at 22:52

## 2019-11-15 RX ADMIN — MESALAMINE 800 MG: 800 TABLET, DELAYED RELEASE ORAL at 09:56

## 2019-11-15 RX ADMIN — WARFARIN SODIUM 10 MG: 5 TABLET ORAL at 19:20

## 2019-11-15 RX ADMIN — CEPHALEXIN 250 MG: 250 CAPSULE ORAL at 19:20

## 2019-11-15 RX ADMIN — METOPROLOL TARTRATE 25 MG: 25 TABLET ORAL at 22:34

## 2019-11-15 RX ADMIN — FERROUS SULFATE TAB 325 MG (65 MG ELEMENTAL FE) 325 MG: 325 (65 FE) TAB at 09:56

## 2019-11-15 RX ADMIN — ROSUVASTATIN CALCIUM 40 MG: 20 TABLET, FILM COATED ORAL at 19:20

## 2019-11-15 RX ADMIN — EZETIMIBE 10 MG: 10 TABLET ORAL at 19:20

## 2019-11-15 RX ADMIN — CEPHALEXIN 250 MG: 250 CAPSULE ORAL at 12:25

## 2019-11-15 RX ADMIN — HYDROCODONE BITARTRATE AND ACETAMINOPHEN 2 TABLET: 5; 325 TABLET ORAL at 09:56

## 2019-11-15 RX ADMIN — HYDROCODONE BITARTRATE AND ACETAMINOPHEN 2 TABLET: 5; 325 TABLET ORAL at 04:14

## 2019-11-15 ASSESSMENT — ACTIVITIES OF DAILY LIVING (ADL)
ADLS_ACUITY_SCORE: 15

## 2019-11-15 NOTE — PLAN OF CARE
Discharge Planner PT   Patient plan for discharge: home with spouse, open to TCU if needed  Current status: PT - Eval completed, treatment initiated. Pt is a 80 y/o male admitted for procedure to repair non-healing distal wound from L TKA 7/22/19, patellar tendon was exposed, covered with rotated medial head of gastroc and skin graft 11/12/19, has been on bedrest until this morning. Ok to WBAT but needs knee immob on at all times, has wound VAC and drain until tomorrow. Pt lives at home in multi-level home with his wife, uses a cane at baseline. Currently, pt transfers with min/mod assist 1, ambulated 15'x2 with wh walker and min assist 1.  Barriers to return to prior living situation: stairs to enter (5-6 steps x 2), currently requires assist 1 for mobility  Recommendations for discharge: home with assist from spouse  Rationale for recommendations: Pt is moving well, anticipate he will be able to ambulate, transfer and navigate stairs with SBA by the time of discharge. If pt does not become fairly IND and has complex dressing changes at discharge, could benefit from TCU placement.       Entered by: Staci Toussaint 11/15/2019 11:58 AM

## 2019-11-15 NOTE — PLAN OF CARE
A&O. VSS ex bradycardic.   Bedrest maintained, wt shift assistance provided as much as possible while keeping leg elevated. Per PT who worked with pt today, pt can be wt bearing as tolerated with immobilizer, assist x1 with walker and gait belt. Pt currently up in chair.  LS diminished, IS encouraged.  BS+, flatus+, BM- Lord in place.  RLE pulses per doppler, LLE pulses palpable.  Graft site on thigh with scant bloody drainage under tegaderm.  Norco for pain.  Heparin maintained at 13.5ml/hr

## 2019-11-15 NOTE — PROGRESS NOTES
Plastic Surgery    Patient doing well but limited ambulation today.    PE: Temp: 98.4  F (36.9  C) Temp src: Oral BP: 119/59 Pulse: 55 Heart Rate: 53 Resp: 16 SpO2: 95 % O2 Device: None (Room air)      Wound VAC in place. Sanguinous drainage right thigh. Drain in place.    A/P:  Continue to increase activity. Plan VAC removal tomorrow am. Await increase in INR prior to discharge. Disposition TCU vs home.    Sara Martinez MD  Plastic Surgery  [facility]  [phone] (office)

## 2019-11-15 NOTE — PROGRESS NOTES
11/15/19 1300   Quick Adds   Type of Visit Initial PT Evaluation   Living Environment   Lives With significant other   Living Arrangements house   Home Accessibility stairs to enter home;stairs within home   Number of Stairs, Main Entrance 5   Number of Stairs, Within Home, Primary 5   Transportation Anticipated family or friend will provide   Living Environment Comment Pt lives in split level type home, cannot avoid stairs   Self-Care   Usual Activity Tolerance good   Current Activity Tolerance fair   Regular Exercise No   Equipment Currently Used at Home cane, straight  (also has wh walker)   Functional Level Prior   Ambulation 1-->assistive equipment   Transferring 0-->independent   Toileting 0-->independent   Bathing 0-->independent   Communication 0-->understands/communicates without difficulty   Swallowing 0-->swallows foods/liquids without difficulty   Cognition 0 - no cognition issues reported   Fall history within last six months no   Which of the above functional risks had a recent onset or change? ambulation   Prior Functional Level Comment had R TKA 7/22/19, has had mobility issues since   General Information   Onset of Illness/Injury or Date of Surgery - Date 11/10/19   Referring Physician Dr Sara Martinez   Patient/Family Goals Statement home when able   Pertinent History of Current Problem (include personal factors and/or comorbidities that impact the POC) Pt had R TKA 7/22/19, was doing well until distal part of incision wouldn't heal, has had multiple interventions without success, now on 11/12/19 had exposed patellar tendon covered with rotated medial head of the gastroc. PMH includes mechanical aortic and mitral valves.   Precautions/Limitations fall precautions  (keep R knee straight - immobilizer)   Weight-Bearing Status - LUE full weight-bearing   Weight-Bearing Status - RUE full weight-bearing   Weight-Bearing Status - LLE full weight-bearing   Weight-Bearing Status - RLE weight-bearing  as tolerated   Cognitive Status Examination   Orientation orientation to person, place and time   Level of Consciousness alert   Follows Commands and Answers Questions 100% of the time;able to follow single-step instructions   Personal Safety and Judgment intact   Memory intact   Pain Assessment   Patient Currently in Pain Yes, see Vital Sign flowsheet   Integumentary/Edema   Integumentary/Edema Comments wound R knee and wound graft area on thigh, all covered with bandages. Also has wound VAC and drain, both to be removed tomorrow.   Posture    Posture Forward head position   Range of Motion (ROM)   ROM Comment WFL L LE , NT R knee d/t need to keep knee straight.   Strength   Strength Comments WFL L LE, NT R LE d/t recent surgery and skin graft   Bed Mobility   Bed Mobility Comments mod assist 1 supine to sit   Transfer Skills   Transfer Comments sit to stand with CGA    Gait   Gait Comments pt amb 5' with wh walker and min assist 1   Balance   Balance Comments good sitting balance, decreased standing balance d/t pain   Sensory Examination   Sensory Perception no deficits were identified   Coordination   Coordination no deficits were identified   Muscle Tone   Muscle Tone no deficits were identified   General Therapy Interventions   Planned Therapy Interventions transfer training;gait training;strengthening   Clinical Impression   Criteria for Skilled Therapeutic Intervention yes, treatment indicated   PT Diagnosis impaired functional mobility   Influenced by the following impairments pain, mobility restrictions, generalized deconditioning from long illness   Functional limitations due to impairments inability to ambulate household distances, requires assist 1   Clinical Presentation Stable/Uncomplicated   Clinical Presentation Rationale per medical record   Clinical Decision Making (Complexity) Low complexity   Therapy Frequency Daily   Predicted Duration of Therapy Intervention (days/wks) 4 days   Anticipated  "Discharge Disposition Home with Assist;Home with Home Therapy   Risk & Benefits of therapy have been explained Yes   Patient, Family & other staff in agreement with plan of care Yes   [facility] TM \"6 Clicks\"   2016, Trustees of [facility], under license to [employer].  All rights reserved.   6 Clicks Short Forms Basic Mobility Inpatient Short Form   [facility] AM-PAC  \"6 Clicks\" V.2 Basic Mobility Inpatient Short Form   1. Turning from your back to your side while in a flat bed without using bedrails? 3 - A Little   2. Moving from lying on your back to sitting on the side of a flat bed without using bedrails? 3 - A Little   3. Moving to and from a bed to a chair (including a wheelchair)? 3 - A Little   4. Standing up from a chair using your arms (e.g., wheelchair, or bedside chair)? 3 - A Little   5. To walk in hospital room? 3 - A Little   6. Climbing 3-5 steps with a railing? 2 - A Lot   Basic Mobility Raw Score (Score out of 24.Lower scores equate to lower levels of function) 17   Total Evaluation Time   Total Evaluation Time (Minutes) 15     "

## 2019-11-15 NOTE — CONSULTS
SW: Consult acknowledged. Currently, pt recommended to discharge to home with spouse assist. No identified SW needs at this time. Please re-consult SW if new needs arise.     JONATHAN Youssef, [facility]  Daytime (8:00am-4:30pm): [phone]  After-Hours SW Pager (4:30pm-11:30pm): [phone]

## 2019-11-15 NOTE — CONSULTS
"BRIEF NUTRITION ASSESSMENT      REASON FOR ASSESSMENT:  Fausto Farr is a 79 year old male seen by Registered Dietitian for Provider Order - \"wound healing\"      CURRENT DIET AND INTAKE:  Diet:  Regular + Boost shake at 10am and 2pm              Chart reviewed  11/14: RN -  \"pt states he does not want to have to use a bedpan and does not want to have a BM until tomorrow after off bedrest. Ordered minimal food for lunch and refused dinner because of this- pt did have 2 boost protein shakes.\"    Visited with pt this afternoon - was sitting up in the recliner  Tolerating po  Ate 100% breakfast - 3 strips barcenas, cereal, blueberry muffin, milk, fruit cup  Likes the Boost shakes (10 gm pro per serving)  Offered him a higher protein supplements - Gelatein PLUS (20 gm pro per serving)  Pt asking that I order him a Boost drink and a Gelatein PLUS right now      ANTHROPOMETRICS:  Height: 5'6\"  Weight:(11/10) 90.5 kg /  199 lbs 8.26 oz  Body mass index is 32.22 kg/m .   Weight Status: Obesity Grade I BMI 30-34.9  IBW:  64.5 kg  %IBW: 140%  Weight History:   Wt Readings from Last 10 Encounters:   11/10/19 90.5 kg (199 lb 8.3 oz)   10/28/19 90.7 kg (200 lb)   10/25/19 91.1 kg (200 lb 12.8 oz)   10/15/19 90.8 kg (200 lb 1.6 oz)   08/29/19 92.1 kg (203 lb)   08/28/19 93.1 kg (205 lb 3.2 oz)   08/14/19 95.2 kg (209 lb 14.4 oz)   08/01/19 98.5 kg (217 lb 1.6 oz)   07/19/19 102.2 kg (225 lb 3.2 oz)   07/08/19 102.5 kg (225 lb 14.4 oz)         LABS:  Labs noted    MALNUTRITION:  Patient does not meet two of the following criteria necessary for diagnosing malnutrition: significant weight loss, reduced intake, subcutaneous fat loss, muscle loss or fluid retention. Nutrition Focused Physical Assessment (NFPA) not appropriate at this time.    NUTRITION INTERVENTION:  Nutrition Diagnosis:  No nutrition diagnosis at this time.    Implementation:  Nutrition Education ---> Discussed importance of adequate po intake, emphasizing protein " foods, for healing and recovery.  Encouraged pt to consume a protein food at each meal.  Will continue to send a chocolate Boost shake at 10am and 2pm  Added a Gelatein PLUS and 4oz Boost with meals  Ordered daily multivitamin     FOLLOW UP/MONITORING:   Will re-evaluate in 7 - 10 days, or sooner, if re-consulted.

## 2019-11-15 NOTE — PLAN OF CARE
WDL. R thigh graft site WDL with moist drainage, tagaderm reinforced. Pulses per doppler. Pain controlled with PRN norco. Bedrest until AM. Regular diet, avoiding eating because pt doesn't want to have to use the bedpan. Nutrition encouraged. Lord in place. Heparin running at 13.5mL. Tele Sinus lashae w/ first degree ABV and BBB.

## 2019-11-15 NOTE — PLAN OF CARE
A&O. VSS ex bradycardic at times. Tele sinus dysrhythmia, BBB, 1st degree AVB.   Bedrest maintained, wt shift assistance provided as much as possible while keeping leg elevated.  LS diminished, IS encouraged.  BS+, flarus+, BM-, pt states he does not want to have to use a bedpan and does not want to have a BM until tomorrow after off bedrest. Ordered minimal food for lunch and refused dinner because of this- pt did have 2 boost protein shakes.   Lord in place with good UO.  LE pulses per doppler.  RLE in immobilizer with 2 pillows underneath. Wound vac on knee CDI, serosangious drainage in canister. Graft site on thigh with scant bloody drainage under tegaderm.  Norco for pain.  Heparin at 13.5ml/hr

## 2019-11-15 NOTE — PROGRESS NOTES
[facility]    Medicine Progress Note - Hospitalist Service       Date of Admission:  11/10/2019  Assessment & Plan     Fausto Farr is a 79-year-old male with medical history including obesity; ulcerative colitis; CAD; HTN; afib/flutter; and mechanical AVR and MVR on warfarin; who has had a delayed healing with persistent wound and tendon visible following a total knee arthroplasty (7/2019) that required surgery. Admitted 11/10/2019 for bridging anticoagulation prior to surgery (planned for 11/12).     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    Had R TKA 7/22/2019. Later suffered wound breakdown, right knee, and underwent I&D and secondary wound closure 8/29/2019. Developed non-healing wound with visible tendon and referred to Plastics.   PTA on cefadroxil  Underwent surgical flap with skin graft  11/12/2019 by Plastic Surgery.    Has been continued on cephalexin (started 11/10 as formulary alternative to cefadroxil which pt was on ). Would defer to primary team for antibiotic.  No intraoperative cultures noted.  PTA. Currently on bedrest.    Defer Postoperative care including pain management, pharmacological DVT prophylaxis, antibiotics, rehabilitation to surgical team     Atrial fibrillation and atrial flutter, status post ablation (4/2019)  Asymptomatic bradycardia postoperatively  Post procedure patient on bedrest, receiving Norco for pain.  Heart rate in 40s and 50s.  Denies any chest pain or shortness of breath or dizziness or headache or palpitation.  Preop EKG A. fib with left axis deviation, right bundle branch block.  TSH 0.54.  Magnesium 2.0.  Heart rate still 40's but asymptomatic.    Continue telemetry monitoring    Continue PTA metoprolol with hold parameters    Anticoagulation management as below     Status post mechanical aortic and mitral valve replacements.  Originally had AVR (along with CABG) 2006. Underwent redo  sternotomy with mechanical AVR and MVR 2013. Historically on chronic anticoagulation with warfarin with a goal INR of 2.5 to 3.5.   Had been off warfarin since Friday, 11/08/2019 in anticipation of needing bridging for surgery.   INR 1.65 on admit 11/10. Started on bridging heparin gtt procedure on 10/12.  Restarted warfarin 11/13 post-surgery per Plastics.  Pharmacy assisting with dosing.      Continue IV heparin and continue to reload with warfarin    Monitor 10a and INR    Home when INR at least over 2 with goal 2.5-3.5     Coronary artery disease   Hypertension (benign essential)  [PTA: furosemide 20 mg daily; metoprolol 25 mg BID.]  S/p CABG (along with AVR) in 2006. Nuc stress 4/2019 showed no ischemia, LVEF 59%.    Continue metoprolol, rosuvastatin    Hold furosemide     Monitor i/o's, daily wts     Anemia, suspect chronic component  Hemoglobin   Date Value Ref Range Status   11/15/2019 10.6 (L) 13.3 - 17.7 g/dL Final   Hgb 11.2 on admit 11/10. No overt clinical signs of major bleeding.    Stable, monitor     PAD  H/o AAA stent repair     Obesity, BMI of 32.22  Obstructive sleep apnea     Consider Lifestyle modification with diet and exercise as able    Continue CPAP     Hyperlipidemia     Continue ezetimibe and rosuvastatin     Benign prostatic hypertrophy    Continue prior to admission tamsulosin    Diet: Advance Diet as Tolerated: Regular Diet Adult  Snacks/Supplements Adult: Boost Shake; Between Meals    DVT Prophylaxis: Pneumatic Compression Devices  Lord Catheter: in place, indication: Wound Healing  Code Status: Full Code      Disposition Plan   Expected discharge: 2 - 3 days, recommended to prior living arrangement once INR is therapeutic .  Entered: Roge Jones MD 11/15/2019, 3:18 PM       The patient's care was discussed with the Bedside Nurse and Patient.    Roge Jones MD  Hospitalist Service  [facility]  Hospital    ______________________________________________________________________    Interval History   Having expected postoperative pain    Data reviewed today: I reviewed all medications, new labs and imaging results over the last 24 hours. I personally reviewed no images or EKG's today.    Physical Exam   Vital Signs: Temp: 98.1  F (36.7  C) Temp src: Oral BP: 116/64 Pulse: 54 Heart Rate: 52 Resp: 16 SpO2: 91 % O2 Device: None (Room air)    Weight: 199 lbs 8.26 oz  Constitutional: awake, alert, cooperative, no apparent distress, and appears stated age  Respiratory: No increased work of breathing, good air exchange, clear to auscultation bilaterally, no crackles or wheezing  Cardiovascular:  regular rate and rhythm, mechanical S1 and S2  GI:  normal bowel sounds, soft, non-distended, non-tender  Neuropsychiatric: General: normal, calm and normal eye contact    Data   Recent Labs   Lab 11/15/19  0806 11/14/19  0753 11/13/19  0804  11/10/19  1509   WBC 12.0*  --  12.7*  --  9.3   HGB 10.6*  --  10.3*  --  11.2*   MCV 90  --  88  --  89     --  337  --  328   INR 1.23* 1.21* 1.27*   < > 1.65*     --  135  --  137   POTASSIUM 3.9  --  4.4  --  3.9   CHLORIDE 102  --  103  --  104   CO2 30  --  27  --  28   BUN 14  --  14  --  17   CR 0.85  --  0.79  --  0.94   ANIONGAP 5  --  5  --  5   AWILDA 8.6  --  8.8  --  8.8   * 104* 125*  --  93   ALBUMIN  --   --  2.9*  --   --    PROTTOTAL  --   --  6.6*  --   --    BILITOTAL  --   --  0.3  --   --    ALKPHOS  --   --  57  --   --    ALT  --   --  18  --   --    AST  --   --  22  --   --     < > = values in this interval not displayed.     No results found for this or any previous visit (from the past 24 hour(s)).  Medications     HEParin 1,350 Units/hr (11/15/19 1402)     lactated ringers 100 mL/hr at 11/12/19 2315     no post procedure antibiotic needed       Warfarin Therapy Reminder         cephALEXin  250 mg Oral Q6H LETITIA     ezetimibe  10 mg Oral QPM      ferrous sulfate  325 mg Oral Daily with breakfast     mesalamine  800 mg Oral BID     metoprolol tartrate  25 mg Oral BID     multivitamin w/minerals  1 tablet Oral Daily     rosuvastatin  40 mg Oral QPM     sodium chloride (PF)  3 mL Intracatheter Q8H     tamsulosin  0.4 mg Oral QPM     warfarin ANTICOAGULANT  10 mg Oral ONCE at 18:00

## 2019-11-16 LAB
ERYTHROCYTE [DISTWIDTH] IN BLOOD BY AUTOMATED COUNT: 14.1 % (ref 10–15)
HCT VFR BLD AUTO: 34.4 % (ref 40–53)
HGB BLD-MCNC: 10.9 G/DL (ref 13.3–17.7)
INR PPP: 1.35 (ref 0.86–1.14)
LMWH PPP CHRO-ACNC: 0.17 IU/ML
MCH RBC QN AUTO: 28.4 PG (ref 26.5–33)
MCHC RBC AUTO-ENTMCNC: 31.7 G/DL (ref 31.5–36.5)
MCV RBC AUTO: 90 FL (ref 78–100)
PLATELET # BLD AUTO: 371 10E9/L (ref 150–450)
RBC # BLD AUTO: 3.84 10E12/L (ref 4.4–5.9)
WBC # BLD AUTO: 10.9 10E9/L (ref 4–11)

## 2019-11-16 PROCEDURE — 36415 COLL VENOUS BLD VENIPUNCTURE: CPT | Performed by: PLASTIC SURGERY

## 2019-11-16 PROCEDURE — 85610 PROTHROMBIN TIME: CPT | Performed by: PLASTIC SURGERY

## 2019-11-16 PROCEDURE — 99233 SBSQ HOSP IP/OBS HIGH 50: CPT | Performed by: HOSPITALIST

## 2019-11-16 PROCEDURE — 25000132 ZZH RX MED GY IP 250 OP 250 PS 637: Mod: GY | Performed by: HOSPITALIST

## 2019-11-16 PROCEDURE — 99207 ZZC CDG-MDM COMPONENT: MEETS MODERATE - UP CODED: CPT | Performed by: HOSPITALIST

## 2019-11-16 PROCEDURE — 85520 HEPARIN ASSAY: CPT | Performed by: PLASTIC SURGERY

## 2019-11-16 PROCEDURE — 25000132 ZZH RX MED GY IP 250 OP 250 PS 637: Mod: GY | Performed by: INTERNAL MEDICINE

## 2019-11-16 PROCEDURE — 25000128 H RX IP 250 OP 636: Performed by: PLASTIC SURGERY

## 2019-11-16 PROCEDURE — 25000132 ZZH RX MED GY IP 250 OP 250 PS 637: Mod: GY | Performed by: PHYSICIAN ASSISTANT

## 2019-11-16 PROCEDURE — 25000132 ZZH RX MED GY IP 250 OP 250 PS 637: Mod: GY | Performed by: PLASTIC SURGERY

## 2019-11-16 PROCEDURE — 85027 COMPLETE CBC AUTOMATED: CPT | Performed by: PLASTIC SURGERY

## 2019-11-16 PROCEDURE — 12000000 ZZH R&B MED SURG/OB

## 2019-11-16 RX ORDER — WARFARIN SODIUM 5 MG/1
10 TABLET ORAL
Status: COMPLETED | OUTPATIENT
Start: 2019-11-16 | End: 2019-11-16

## 2019-11-16 RX ORDER — BISACODYL 10 MG
10 SUPPOSITORY, RECTAL RECTAL DAILY PRN
Status: DISCONTINUED | OUTPATIENT
Start: 2019-11-16 | End: 2019-11-19 | Stop reason: HOSPADM

## 2019-11-16 RX ORDER — POLYETHYLENE GLYCOL 3350 17 G/17G
17 POWDER, FOR SOLUTION ORAL DAILY
Status: DISCONTINUED | OUTPATIENT
Start: 2019-11-16 | End: 2019-11-19 | Stop reason: HOSPADM

## 2019-11-16 RX ORDER — MINERAL OIL/HYDROPHIL PETROLAT
OINTMENT (GRAM) TOPICAL DAILY
Status: DISCONTINUED | OUTPATIENT
Start: 2019-11-16 | End: 2019-11-19 | Stop reason: HOSPADM

## 2019-11-16 RX ADMIN — HEPARIN SODIUM 1350 UNITS/HR: 10000 INJECTION, SOLUTION INTRAVENOUS at 11:00

## 2019-11-16 RX ADMIN — FERROUS SULFATE TAB 325 MG (65 MG ELEMENTAL FE) 325 MG: 325 (65 FE) TAB at 09:12

## 2019-11-16 RX ADMIN — TAMSULOSIN HYDROCHLORIDE 0.4 MG: 0.4 CAPSULE ORAL at 21:00

## 2019-11-16 RX ADMIN — POLYETHYLENE GLYCOL 3350 17 G: 17 POWDER, FOR SOLUTION ORAL at 11:02

## 2019-11-16 RX ADMIN — HYDROCODONE BITARTRATE AND ACETAMINOPHEN 1 TABLET: 5; 325 TABLET ORAL at 21:00

## 2019-11-16 RX ADMIN — EZETIMIBE 10 MG: 10 TABLET ORAL at 21:00

## 2019-11-16 RX ADMIN — Medication 10 MG: at 02:06

## 2019-11-16 RX ADMIN — CEPHALEXIN 250 MG: 250 CAPSULE ORAL at 01:24

## 2019-11-16 RX ADMIN — CEPHALEXIN 250 MG: 250 CAPSULE ORAL at 06:50

## 2019-11-16 RX ADMIN — MESALAMINE 800 MG: 800 TABLET, DELAYED RELEASE ORAL at 09:12

## 2019-11-16 RX ADMIN — WHITE PETROLATUM: 1.75 OINTMENT TOPICAL at 12:57

## 2019-11-16 RX ADMIN — MESALAMINE 800 MG: 800 TABLET, DELAYED RELEASE ORAL at 21:01

## 2019-11-16 RX ADMIN — HYDROCODONE BITARTRATE AND ACETAMINOPHEN 2 TABLET: 5; 325 TABLET ORAL at 11:19

## 2019-11-16 RX ADMIN — MULTIPLE VITAMINS W/ MINERALS TAB 1 TABLET: TAB at 09:12

## 2019-11-16 RX ADMIN — WARFARIN SODIUM 10 MG: 5 TABLET ORAL at 17:07

## 2019-11-16 RX ADMIN — ROSUVASTATIN CALCIUM 40 MG: 20 TABLET, FILM COATED ORAL at 21:00

## 2019-11-16 RX ADMIN — HYDROCODONE BITARTRATE AND ACETAMINOPHEN 2 TABLET: 5; 325 TABLET ORAL at 07:13

## 2019-11-16 RX ADMIN — CEPHALEXIN 250 MG: 250 CAPSULE ORAL at 11:19

## 2019-11-16 RX ADMIN — HYDROCODONE BITARTRATE AND ACETAMINOPHEN 2 TABLET: 5; 325 TABLET ORAL at 17:42

## 2019-11-16 ASSESSMENT — ACTIVITIES OF DAILY LIVING (ADL)
ADLS_ACUITY_SCORE: 15

## 2019-11-16 NOTE — PROVIDER NOTIFICATION
"Dr whitehead Notified: \"329-1 GR, G Pt reporting increased pain related to ongoing constipation. requesting Supp.\"  "

## 2019-11-16 NOTE — PROGRESS NOTES
Plastic Surgery    Patient doing well.  Lord discontinued.     PE: Temp: 97.7  F (36.5  C) Temp src: Oral BP: 134/70 Pulse: 55 Heart Rate: 75 Resp: 16 SpO2: 97 % O2 Device: None (Room air)      Muscle/skin graft healing well.  Some bruising posterior calf. Drain in place. Donor site healing as expected.    A/P:  Continue to increase activity. Discussed wound cares with nurse. Family prefers HHN after discharge if possible. Will discontinue antibiotic. Await for INR to be therapeutic.    Sara Martinez MD  Plastic Surgery  [facility]  [phone] (office)

## 2019-11-16 NOTE — PLAN OF CARE
A&O. VSS ex lashae, tele SB with BBB    LS diminished, IS performed.  BS+, flatus+, BM+ Lord removed at 1120, due to void, bladder scan at 1440 for 487, pt refused straight cath, stated he wanted to wait awhile and try to void himself.  Ambulated in martins x1 this shift.    Heparin maintained at 13.5ml/hr. Still awaiting INR to increase.    Wound vac removed from knee by Dr. Martinez who explained wound cares to writer and wrote new orders.  -Graft site cleaned with saline and new tegaderm applied.  -Knee site dressed with xeroform, kerlix and ace wrap to hold dressing in place.   -Posterior calf/knee incision site dressing removed, site cleaned, aquaphor applied as ordered, this area is bruised and skin appears very thin and delicate as it pulled with the tegaderm removal. per MD can be open to air, however the kerlix over the anterior knee site does cover much of it.  These dressing changes were done with sterile technique and assist of 2 Rns.

## 2019-11-16 NOTE — PLAN OF CARE
AVSS on RA. Pain controlled with PO Tully. Knee brace in place. Anterior wound vac maintained. LS clear and equal.  Diet regular; appetite improving. Up with assist of 1; walker gait belt. BS active and audible; passing gas, stooling. Lord with AUO.

## 2019-11-16 NOTE — PROGRESS NOTES
[facility]    Medicine Progress Note - Hospitalist Service       Date of Admission:  11/10/2019  Assessment & Plan     Fausto Farr is a 79-year-old male with medical history including obesity; ulcerative colitis; CAD; HTN; afib/flutter; and mechanical AVR and MVR on warfarin; who has had a delayed healing with persistent wound and tendon visible following a total knee arthroplasty (7/2019) that required surgery. Admitted 11/10/2019 for bridging anticoagulation prior to surgery.     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    Had R TKA 7/22/2019. Later suffered wound breakdown, right knee, and underwent I&D and secondary wound closure 8/29/2019. Developed non-healing wound with visible tendon and referred to Plastics.   PTA on cefadroxil  Underwent surgical flap with skin graft  11/12/2019 by Plastic Surgery.    Has been continued on cephalexin (started 11/10 as formulary alternative to cefadroxil which pt was on ). Would defer to primary team for antibiotic.  No intraoperative cultures noted.  PTA. Currently on bedrest.    Defer Postoperative care including pain management, pharmacological DVT prophylaxis, antibiotics, rehabilitation to surgical team     Atrial fibrillation and atrial flutter, status post ablation (4/2019)  Asymptomatic bradycardia postoperatively  Post procedure patient on bedrest, receiving Norco for pain.  Heart rate in 40s and 50s.  Denies any chest pain or shortness of breath or dizziness or headache or palpitation.  Preop EKG A. fib with left axis deviation, right bundle branch block.  TSH 0.54.  Magnesium 2.0.  Heart rate still 40's but asymptomatic.    Continue telemetry monitoring    Continue PTA metoprolol with hold parameters    Anticoagulation management as below     Status post mechanical aortic and mitral valve replacements on anticoagulation   INR   Date Value Ref Range Status   11/16/2019 1.35 (H) 0.86 -  1.14 Final    Originally had AVR (along with CABG) 2006. Underwent redo sternotomy with mechanical AVR and MVR 2013. Historically on chronic anticoagulation with warfarin with a goal INR of 2.5 to 3.5.   Had been off warfarin since Friday, 11/08/2019 in anticipation of needing bridging for surgery.   INR 1.65 on admit 11/10. Started on bridging heparin gtt procedure on 10/12.  Restarted warfarin 11/13 post-surgery per Plastics.  Pharmacy assisting with dosing.      Continue IV heparin and continue to reload with warfarin    Monitor 10a and INR    Home when INR at least over 2 with goal 2.5-3.5     Coronary artery disease   Hypertension (benign essential)  [PTA: furosemide 20 mg daily; metoprolol 25 mg BID.]  S/p CABG (along with AVR) in 2006. Nuc stress 4/2019 showed no ischemia, LVEF 59%.    Continue metoprolol, rosuvastatin    Hold furosemide     Monitor i/o's, daily wts     Anemia, suspect chronic component  Hemoglobin   Date Value Ref Range Status   11/16/2019 10.9 (L) 13.3 - 17.7 g/dL Final   Hgb 11.2 on admit 11/10. No overt clinical signs of major bleeding.    Stable, monitor     PAD  H/o AAA stent repair     Obesity, BMI of 32.22  Obstructive sleep apnea     Consider Lifestyle modification with diet and exercise as able    Continue CPAP     Hyperlipidemia     Continue ezetimibe and rosuvastatin     Benign prostatic hypertrophy    Continue prior to admission tamsulosin    Constipation     Miralax ordered    Diet: Advance Diet as Tolerated: Regular Diet Adult  Snacks/Supplements Adult: Boost Shake; Between Meals    DVT Prophylaxis: Pneumatic Compression Devices  Lord Catheter: in place, indication: Wound Healing  Code Status: Full Code      Disposition Plan   Expected discharge: 2 - 3 days, recommended to prior living arrangement once INR is therapeutic .  Entered: Roge Jones MD 11/16/2019, 2:22 PM       The patient's care was discussed with the Bedside Nurse and Patient.    Roge Jones  MD  Hospitalist Service  [facility]    ______________________________________________________________________    Interval History   Constipated- had a bowel movement last night after a suppository.    Data reviewed today: I reviewed all medications, new labs and imaging results over the last 24 hours. I personally reviewed no images or EKG's today.    Physical Exam   Vital Signs: Temp: 97.7  F (36.5  C) Temp src: Oral BP: 134/70 Pulse: 55 Heart Rate: 75 Resp: 16 SpO2: 97 % O2 Device: None (Room air)    Weight: 199 lbs 8.26 oz  Constitutional: awake, alert, cooperative, no apparent distress, and appears stated age  Respiratory: No increased work of breathing, good air exchange, clear to auscultation bilaterally, no crackles or wheezing  Cardiovascular:  regular rate and rhythm, mechanical S1 and S2  GI:  normal bowel sounds, soft, non-distended, non-tender  Neuropsychiatric: General: normal, calm and normal eye contact    Data   Recent Labs   Lab 11/16/19  0750 11/15/19  0806 11/14/19  0753 11/13/19  0804  11/10/19  1509   WBC 10.9 12.0*  --  12.7*  --  9.3   HGB 10.9* 10.6*  --  10.3*  --  11.2*   MCV 90 90  --  88  --  89    335  --  337  --  328   INR 1.35* 1.23* 1.21* 1.27*   < > 1.65*   NA  --  137  --  135  --  137   POTASSIUM  --  3.9  --  4.4  --  3.9   CHLORIDE  --  102  --  103  --  104   CO2  --  30  --  27  --  28   BUN  --  14  --  14  --  17   CR  --  0.85  --  0.79  --  0.94   ANIONGAP  --  5  --  5  --  5   AWILDA  --  8.6  --  8.8  --  8.8   GLC  --  139* 104* 125*  --  93   ALBUMIN  --   --   --  2.9*  --   --    PROTTOTAL  --   --   --  6.6*  --   --    BILITOTAL  --   --   --  0.3  --   --    ALKPHOS  --   --   --  57  --   --    ALT  --   --   --  18  --   --    AST  --   --   --  22  --   --     < > = values in this interval not displayed.     No results found for this or any previous visit (from the past 24 hour(s)).  Medications     HEParin 1,350 Units/hr (11/16/19 1100)      Warfarin Therapy Reminder         ezetimibe  10 mg Oral QPM     ferrous sulfate  325 mg Oral Daily with breakfast     mesalamine  800 mg Oral BID     metoprolol tartrate  25 mg Oral BID     mineral oil-hydrophilic petrolatum   Topical Daily     multivitamin w/minerals  1 tablet Oral Daily     polyethylene glycol  17 g Oral Daily     rosuvastatin  40 mg Oral QPM     sodium chloride (PF)  3 mL Intracatheter Q8H     tamsulosin  0.4 mg Oral QPM     warfarin ANTICOAGULANT  10 mg Oral ONCE at 18:00

## 2019-11-17 ENCOUNTER — APPOINTMENT (OUTPATIENT)
Dept: PHYSICAL THERAPY | Facility: CLINIC | Age: 79
DRG: 904 | End: 2019-11-17
Attending: PLASTIC SURGERY
Payer: MEDICARE

## 2019-11-17 LAB
INR PPP: 1.66 (ref 0.86–1.14)
LMWH PPP CHRO-ACNC: 0.14 IU/ML
LMWH PPP CHRO-ACNC: 0.27 IU/ML

## 2019-11-17 PROCEDURE — 93010 ELECTROCARDIOGRAM REPORT: CPT | Performed by: INTERNAL MEDICINE

## 2019-11-17 PROCEDURE — 97530 THERAPEUTIC ACTIVITIES: CPT | Mod: GP

## 2019-11-17 PROCEDURE — 25000132 ZZH RX MED GY IP 250 OP 250 PS 637: Mod: GY | Performed by: PLASTIC SURGERY

## 2019-11-17 PROCEDURE — 85520 HEPARIN ASSAY: CPT | Performed by: PLASTIC SURGERY

## 2019-11-17 PROCEDURE — 99207 ZZC CDG-MDM COMPONENT: MEETS MODERATE - UP CODED: CPT | Performed by: HOSPITALIST

## 2019-11-17 PROCEDURE — 36415 COLL VENOUS BLD VENIPUNCTURE: CPT | Performed by: PLASTIC SURGERY

## 2019-11-17 PROCEDURE — 93005 ELECTROCARDIOGRAM TRACING: CPT

## 2019-11-17 PROCEDURE — 25000128 H RX IP 250 OP 636: Performed by: PLASTIC SURGERY

## 2019-11-17 PROCEDURE — 99233 SBSQ HOSP IP/OBS HIGH 50: CPT | Performed by: HOSPITALIST

## 2019-11-17 PROCEDURE — 12000000 ZZH R&B MED SURG/OB

## 2019-11-17 PROCEDURE — 25000132 ZZH RX MED GY IP 250 OP 250 PS 637: Mod: GY | Performed by: HOSPITALIST

## 2019-11-17 PROCEDURE — 97116 GAIT TRAINING THERAPY: CPT | Mod: GP

## 2019-11-17 PROCEDURE — 85610 PROTHROMBIN TIME: CPT | Performed by: PLASTIC SURGERY

## 2019-11-17 PROCEDURE — 25000132 ZZH RX MED GY IP 250 OP 250 PS 637: Mod: GY | Performed by: PHYSICIAN ASSISTANT

## 2019-11-17 RX ORDER — WARFARIN SODIUM 5 MG/1
10 TABLET ORAL
Status: COMPLETED | OUTPATIENT
Start: 2019-11-17 | End: 2019-11-17

## 2019-11-17 RX ADMIN — HYDROCODONE BITARTRATE AND ACETAMINOPHEN 2 TABLET: 5; 325 TABLET ORAL at 06:47

## 2019-11-17 RX ADMIN — MESALAMINE 800 MG: 800 TABLET, DELAYED RELEASE ORAL at 09:51

## 2019-11-17 RX ADMIN — TAMSULOSIN HYDROCHLORIDE 0.4 MG: 0.4 CAPSULE ORAL at 21:23

## 2019-11-17 RX ADMIN — POLYETHYLENE GLYCOL 3350 17 G: 17 POWDER, FOR SOLUTION ORAL at 09:51

## 2019-11-17 RX ADMIN — HEPARIN SODIUM 1500 UNITS/HR: 10000 INJECTION, SOLUTION INTRAVENOUS at 22:59

## 2019-11-17 RX ADMIN — ROSUVASTATIN CALCIUM 40 MG: 20 TABLET, FILM COATED ORAL at 21:23

## 2019-11-17 RX ADMIN — WHITE PETROLATUM: 1.75 OINTMENT TOPICAL at 09:51

## 2019-11-17 RX ADMIN — Medication 2200 UNITS: at 09:49

## 2019-11-17 RX ADMIN — MULTIPLE VITAMINS W/ MINERALS TAB 1 TABLET: TAB at 09:51

## 2019-11-17 RX ADMIN — EZETIMIBE 10 MG: 10 TABLET ORAL at 21:24

## 2019-11-17 RX ADMIN — WARFARIN SODIUM 10 MG: 5 TABLET ORAL at 18:23

## 2019-11-17 RX ADMIN — HYDROCODONE BITARTRATE AND ACETAMINOPHEN 2 TABLET: 5; 325 TABLET ORAL at 13:57

## 2019-11-17 RX ADMIN — HYDROCODONE BITARTRATE AND ACETAMINOPHEN 2 TABLET: 5; 325 TABLET ORAL at 22:59

## 2019-11-17 RX ADMIN — FERROUS SULFATE TAB 325 MG (65 MG ELEMENTAL FE) 325 MG: 325 (65 FE) TAB at 09:51

## 2019-11-17 RX ADMIN — MESALAMINE 800 MG: 800 TABLET, DELAYED RELEASE ORAL at 21:24

## 2019-11-17 ASSESSMENT — ACTIVITIES OF DAILY LIVING (ADL)
ADLS_ACUITY_SCORE: 15

## 2019-11-17 NOTE — PLAN OF CARE
AVSS on RA. Pain controlled with PO.  Incisions CDI; harvest site some moist drainage.  LS dim. Diet regular. Up with assist of 1; with brace. BS active and audible.

## 2019-11-17 NOTE — PLAN OF CARE
Discharge Planner PT   Patient plan for discharge: Home  Current status: Pt dependent to don KI. Pt min assist for bed mobility. Pt amb 10' and 75' with FWW and CGA with KI in place, decreased speed.  Barriers to return to prior living situation: Decreased activity tolerance, decreased strength  Recommendations for discharge: Home with assist for stairs and higher level tasks  Rationale for recommendations: Pt is progressing with mobility, needs to try stairs. If pt has difficulty with stairs or has complex dressing changes, may benefit from TCU placement.       Entered by: Estefania Daniels 11/17/2019 3:49 PM

## 2019-11-17 NOTE — PROGRESS NOTES
Plastic Surgery    Patient doing well. No issues/concerns.    PE: Temp: 98.3  F (36.8  C) Temp src: Oral BP: 114/59 Pulse: (!) 48 Heart Rate: 53 Resp: 16 SpO2: 94 % O2 Device: None (Room air)      Dressing in place    A/P:  Anticipate discharge when INR at acceptable level. Increase activity and ambulate in hallways TID.    Sara Martinez MD  Plastic Surgery  [facility]  [phone] (office)

## 2019-11-17 NOTE — PLAN OF CARE
Pt AO, VSS on RA ex lashae. Norco x 1 for pain. Tele SB with BBB. Drsg CDI. Doppler pulses. RON with small serosanguinous oupt. Knee immobilizer on with activity. Voiding post-Lord removal. Heparin gtt running at 13.5ml/hr. 10a recheck tomorrow am. +BS, +flatus. LS clear. Regular diet. Up A1 walker and GB. Continue to monitor.

## 2019-11-17 NOTE — PROGRESS NOTES
[facility]    Medicine Progress Note - Hospitalist Service       Date of Admission:  11/10/2019  Assessment & Plan     Fausto Farr is a 79-year-old male with medical history including obesity; ulcerative colitis; CAD; HTN; afib/flutter; and mechanical AVR and MVR on warfarin; who has had a delayed healing with persistent wound and tendon visible following a total knee arthroplasty (7/2019) that required surgery. Admitted 11/10/2019 for bridging anticoagulation prior to surgery.     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    Had R TKA 7/22/2019. Later suffered wound breakdown, right knee, and underwent I&D and secondary wound closure 8/29/2019. Developed non-healing wound with visible tendon and referred to Plastics.   PTA on cefadroxil  Underwent surgical flap with skin graft  11/12/2019 by Plastic Surgery.    Has been continued on cephalexin (started 11/10 as formulary alternative to cefadroxil which pt was on ). Would defer to primary team for antibiotic.  No intraoperative cultures noted.  PTA. Currently on bedrest.    Postoperative care including pain management, pharmacological DVT prophylaxis, antibiotics, rehabilitation per plastic surgeon     Atrial fibrillation and atrial flutter, status post ablation (4/2019)  Asymptomatic bradycardia postoperatively  Post procedure patient on bedrest, receiving Norco for pain.  Heart rate in 40s and 50s.  Denies any chest pain or shortness of breath or dizziness or headache or palpitation.  Preop EKG A. fib with left axis deviation, right bundle branch block.  TSH 0.54.  Magnesium 2.0.  Heart rate still 40's but asymptomatic.    Continue telemetry monitoring    Continue PTA metoprolol with hold parameters    Anticoagulation management as below     Status post mechanical aortic and mitral valve replacements on anticoagulation   INR   Date Value Ref Range Status   11/17/2019 1.66 (H) 0.86 -  1.14 Final    Originally had AVR (along with CABG) 2006. Underwent redo sternotomy with mechanical AVR and MVR 2013. Historically on chronic anticoagulation with warfarin with a goal INR of 2.5 to 3.5.   Had been off warfarin since Friday, 11/08/2019 in anticipation of needing bridging for surgery.   INR 1.65 on admit 11/10. Started on bridging heparin gtt procedure on 10/12.  Restarted warfarin 11/13 post-surgery per Plastics.  Pharmacy assisting with dosing.      Continue IV heparin and continue to reload with warfarin    Monitor 10a and INR    Home when INR at least over 2 with goal 2.5-3.5     Coronary artery disease   Hypertension (benign essential)  [PTA: furosemide 20 mg daily; metoprolol 25 mg BID.]  S/p CABG (along with AVR) in 2006. Nuc stress 4/2019 showed no ischemia, LVEF 59%.    Continue metoprolol, rosuvastatin    Hold furosemide     Monitor i/o's, daily wts     Anemia, suspect chronic component  Hemoglobin   Date Value Ref Range Status   11/16/2019 10.9 (L) 13.3 - 17.7 g/dL Final   Hgb 11.2 on admit 11/10. No overt clinical signs of major bleeding.    Stable, monitor     PAD  H/o AAA stent repair     Obesity, BMI of 32.22  Obstructive sleep apnea     Consider Lifestyle modification with diet and exercise as able    Continue CPAP     Hyperlipidemia     Continue ezetimibe and rosuvastatin     Benign prostatic hypertrophy    Continue prior to admission tamsulosin    Constipation   He had 4 bowel movements yesterday after Miralax    Continue Miralax     Diet: Advance Diet as Tolerated: Regular Diet Adult  Snacks/Supplements Adult: Boost Shake; Between Meals    DVT Prophylaxis: Pneumatic Compression Devices  Lord Catheter: not present  Code Status: Full Code      Disposition Plan   Expected discharge: 2 - 3 days, recommended to prior living arrangement once INR is therapeutic .  Entered: Roge Jones MD 11/17/2019, 1:20 PM       The patient's care was discussed with the Bedside Nurse and  Patient.    Roge Jones MD  Hospitalist Service  [facility]    ______________________________________________________________________    Interval History   Had 4 bowel movements with Miralax.  No new complaints.    Data reviewed today: I reviewed all medications, new labs and imaging results over the last 24 hours. I personally reviewed no images or EKG's today.    Physical Exam   Vital Signs: Temp: 98.1  F (36.7  C) Temp src: Oral BP: 108/52 Pulse: (!) 48 Heart Rate: 53 Resp: 17 SpO2: 95 % O2 Device: None (Room air)    Weight: 199 lbs 8.26 oz  Constitutional: awake, alert, cooperative, no apparent distress, and appears stated age  Respiratory: No increased work of breathing, good air exchange, clear to auscultation bilaterally, no crackles or wheezing  Cardiovascular:  regular rate and rhythm, mechanical S1 and S2  GI:  normal bowel sounds, soft, non-distended, non-tender  Neuropsychiatric: General: normal, calm and normal eye contact    Data   Recent Labs   Lab 11/17/19  0750 11/16/19  0750 11/15/19  0806 11/14/19  0753 11/13/19  0804  11/10/19  1509   WBC  --  10.9 12.0*  --  12.7*  --  9.3   HGB  --  10.9* 10.6*  --  10.3*  --  11.2*   MCV  --  90 90  --  88  --  89   PLT  --  371 335  --  337  --  328   INR 1.66* 1.35* 1.23* 1.21* 1.27*   < > 1.65*   NA  --   --  137  --  135  --  137   POTASSIUM  --   --  3.9  --  4.4  --  3.9   CHLORIDE  --   --  102  --  103  --  104   CO2  --   --  30  --  27  --  28   BUN  --   --  14  --  14  --  17   CR  --   --  0.85  --  0.79  --  0.94   ANIONGAP  --   --  5  --  5  --  5   AWILDA  --   --  8.6  --  8.8  --  8.8   GLC  --   --  139* 104* 125*  --  93   ALBUMIN  --   --   --   --  2.9*  --   --    PROTTOTAL  --   --   --   --  6.6*  --   --    BILITOTAL  --   --   --   --  0.3  --   --    ALKPHOS  --   --   --   --  57  --   --    ALT  --   --   --   --  18  --   --    AST  --   --   --   --  22  --   --     < > = values in this interval not  displayed.     No results found for this or any previous visit (from the past 24 hour(s)).  Medications     HEParin 1,500 Units/hr (11/17/19 0963)     Warfarin Therapy Reminder         ezetimibe  10 mg Oral QPM     ferrous sulfate  325 mg Oral Daily with breakfast     mesalamine  800 mg Oral BID     metoprolol tartrate  25 mg Oral BID     mineral oil-hydrophilic petrolatum   Topical Daily     multivitamin w/minerals  1 tablet Oral Daily     polyethylene glycol  17 g Oral Daily     rosuvastatin  40 mg Oral QPM     sodium chloride (PF)  3 mL Intracatheter Q8H     tamsulosin  0.4 mg Oral QPM     warfarin ANTICOAGULANT  10 mg Oral ONCE at 18:00

## 2019-11-18 ENCOUNTER — APPOINTMENT (OUTPATIENT)
Dept: PHYSICAL THERAPY | Facility: CLINIC | Age: 79
DRG: 904 | End: 2019-11-18
Attending: PLASTIC SURGERY
Payer: MEDICARE

## 2019-11-18 LAB
INR PPP: 1.95 (ref 0.86–1.14)
LMWH PPP CHRO-ACNC: 0.23 IU/ML
PLATELET # BLD AUTO: 424 10E9/L (ref 150–450)

## 2019-11-18 PROCEDURE — 99232 SBSQ HOSP IP/OBS MODERATE 35: CPT | Performed by: INTERNAL MEDICINE

## 2019-11-18 PROCEDURE — 36415 COLL VENOUS BLD VENIPUNCTURE: CPT | Performed by: PLASTIC SURGERY

## 2019-11-18 PROCEDURE — 85610 PROTHROMBIN TIME: CPT | Performed by: PLASTIC SURGERY

## 2019-11-18 PROCEDURE — 99222 1ST HOSP IP/OBS MODERATE 55: CPT | Performed by: INTERNAL MEDICINE

## 2019-11-18 PROCEDURE — 12000000 ZZH R&B MED SURG/OB

## 2019-11-18 PROCEDURE — 25000128 H RX IP 250 OP 636

## 2019-11-18 PROCEDURE — 85049 AUTOMATED PLATELET COUNT: CPT | Performed by: PLASTIC SURGERY

## 2019-11-18 PROCEDURE — 25000132 ZZH RX MED GY IP 250 OP 250 PS 637: Mod: GY

## 2019-11-18 PROCEDURE — 99207 ZZC CDG-MDM COMPONENT: MEETS MODERATE - UP CODED: CPT | Performed by: INTERNAL MEDICINE

## 2019-11-18 PROCEDURE — 25000132 ZZH RX MED GY IP 250 OP 250 PS 637: Mod: GY | Performed by: PLASTIC SURGERY

## 2019-11-18 PROCEDURE — 97530 THERAPEUTIC ACTIVITIES: CPT | Mod: GP

## 2019-11-18 PROCEDURE — 25000132 ZZH RX MED GY IP 250 OP 250 PS 637: Mod: GY | Performed by: HOSPITALIST

## 2019-11-18 PROCEDURE — 85520 HEPARIN ASSAY: CPT | Performed by: PLASTIC SURGERY

## 2019-11-18 PROCEDURE — 25000132 ZZH RX MED GY IP 250 OP 250 PS 637: Mod: GY | Performed by: PHYSICIAN ASSISTANT

## 2019-11-18 PROCEDURE — 97116 GAIT TRAINING THERAPY: CPT | Mod: GP

## 2019-11-18 RX ORDER — HYDROCODONE BITARTRATE AND ACETAMINOPHEN 5; 325 MG/1; MG/1
1-2 TABLET ORAL EVERY 4 HOURS PRN
Qty: 30 TABLET | Refills: 0 | Status: SHIPPED | OUTPATIENT
Start: 2019-11-18 | End: 2019-11-26

## 2019-11-18 RX ORDER — WARFARIN SODIUM 5 MG/1
10 TABLET ORAL
Status: COMPLETED | OUTPATIENT
Start: 2019-11-18 | End: 2019-11-18

## 2019-11-18 RX ADMIN — HYDROCODONE BITARTRATE AND ACETAMINOPHEN 2 TABLET: 5; 325 TABLET ORAL at 14:34

## 2019-11-18 RX ADMIN — MESALAMINE 800 MG: 800 TABLET, DELAYED RELEASE ORAL at 08:02

## 2019-11-18 RX ADMIN — WHITE PETROLATUM: 1.75 OINTMENT TOPICAL at 12:43

## 2019-11-18 RX ADMIN — MULTIPLE VITAMINS W/ MINERALS TAB 1 TABLET: TAB at 08:02

## 2019-11-18 RX ADMIN — POLYETHYLENE GLYCOL 3350 17 G: 17 POWDER, FOR SOLUTION ORAL at 08:03

## 2019-11-18 RX ADMIN — EZETIMIBE 10 MG: 10 TABLET ORAL at 21:05

## 2019-11-18 RX ADMIN — HYDROCODONE BITARTRATE AND ACETAMINOPHEN 2 TABLET: 5; 325 TABLET ORAL at 21:06

## 2019-11-18 RX ADMIN — WARFARIN SODIUM 10 MG: 5 TABLET ORAL at 17:16

## 2019-11-18 RX ADMIN — TAMSULOSIN HYDROCHLORIDE 0.4 MG: 0.4 CAPSULE ORAL at 21:06

## 2019-11-18 RX ADMIN — FERROUS SULFATE TAB 325 MG (65 MG ELEMENTAL FE) 325 MG: 325 (65 FE) TAB at 08:03

## 2019-11-18 RX ADMIN — MESALAMINE 800 MG: 800 TABLET, DELAYED RELEASE ORAL at 21:06

## 2019-11-18 RX ADMIN — ROSUVASTATIN CALCIUM 40 MG: 20 TABLET, FILM COATED ORAL at 21:06

## 2019-11-18 RX ADMIN — HEPARIN SODIUM 1500 UNITS/HR: 10000 INJECTION, SOLUTION INTRAVENOUS at 16:02

## 2019-11-18 RX ADMIN — HYDROCODONE BITARTRATE AND ACETAMINOPHEN 2 TABLET: 5; 325 TABLET ORAL at 08:03

## 2019-11-18 ASSESSMENT — ACTIVITIES OF DAILY LIVING (ADL)
ADLS_ACUITY_SCORE: 14
ADLS_ACUITY_SCORE: 16
ADLS_ACUITY_SCORE: 15
ADLS_ACUITY_SCORE: 14
ADLS_ACUITY_SCORE: 16
ADLS_ACUITY_SCORE: 14

## 2019-11-18 NOTE — PLAN OF CARE
Discharge Planner PT   Patient plan for discharge: home with assist from wife  Current status: Per chart review, patient with EKG changes noted. He has been seen by Cardiology with outpatient follow up planned, confirmed with RN prior to PT session. Patient requiring SBA for bed mobility and sit <> stand transfers with FWW. Patient ambulated 15' + 200' with FWW, KI donned and SBA. He was able to navigate up/down 6 steps with CGA and 1 railing, patient will have B railings at home.   Barriers to return to prior living situation: steps, use of KI  Recommendations for discharge: home with contact guard assist from wife for stairs  Rationale for recommendations: Patient progressing towards acute PT goals, requiring only SBA for mobility. Patient will have adequate assist from wife to return home at discharge and did not demonstrate any significant difficulty with stairs.        Entered by: Karyna Abreu 11/18/2019 4:04 PM

## 2019-11-18 NOTE — PROGRESS NOTES
Plastic Surgery    Chart reviewed. Likely discharge tomorrow if INR >2.     PE: Temp: 98.2  F (36.8  C) Temp src: Oral BP: 116/51 Pulse: 50 Heart Rate: (!) 49 Resp: 16 SpO2: 98 % O2 Device: None (Room air)          A/P:  Continue present cares with plans for possible discharge tomorrow with HHN.    Sara Martinez MD  Plastic Surgery  [facility]  [phone] (office)

## 2019-11-18 NOTE — PROGRESS NOTES
[facility]    Internal Medicine Hospitalist Progress Note  11/18/2019  I evaluated patient on the above date.    Jack Spann Jr., MD  [phone] (p)  Text Page        Assessment & Plan New actions/orders today (11/18/2019) are underlined.    Fausto Farr is a 79-year-old male with medical history including obesity; ulcerative colitis; CAD; HTN; afib/flutter; and mechanical AVR and MVR on warfarin; who has had a delayed healing with persistent wound and tendon visible following a total knee arthroplasty (7/2019) that required surgery. Admitted 11/10/2019 for bridging anticoagulation prior to surgery.     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  * Had R TKA 7/22/2019. Later suffered wound breakdown, right knee, and underwent I&D and secondary wound closure 8/29/2019. Developed non-healing wound with visible tendon and referred to Plastics. PTA on cefadroxil  * Was continued on cephalexin (started 11/10 as formulary alternative to cefadroxil which pt was on). Underwent surgical flap with skin graft 11/12/2019 by Plastic Surgery. Completed cephalexin 11/16.  - Post-op management including activity per Plastics.    Atrial fibrillation and atrial flutter, status post ablation (4/2019).  Bradycardia postoperatively related to chronic conduction disease with worsening due to med effect.  Preop EKG A. fib with left axis deviation, right bundle branch block. Post procedure patient on bedrest, received Norco for pain, heart rate in 40s and 50s. Denied any chest pain or shortness of breath or dizziness or headache or palpitations. Noted with intermittent second degree AVB and bradycardia to 30's-40's. PTA metoprolol stopped 11/17 (multiple doses had been held prior due to HR parameters). Seen by EP and felt pt may have  had some symptoms and PPM recommended but not immediately given recent surgery.  - Continue telemetry.  - Metoprolol stopped.  - Plan discharge on 30d cardiac monitor.  - Follow-up with EP outpatient.  - Appreciate help from Dr. Vasquez.    Status post mechanical aortic and mitral valve replacements on anticoagulation.  * Originally had AVR (along with CABG) 2006. Underwent redo sternotomy with mechanical AVR and MVR 2013. Historically on chronic anticoagulation with warfarin with a goal INR of 2.5 to 3.5. Had been off warfarin since Friday, 11/08/2019 in anticipation of needing bridging for surgery.   * INR 1.65 on admit 11/10. Started on bridging heparin gtt post surgery on 11/12. Restarted warfarin 11/13.    Recent Labs   Lab 11/18/19  0817 11/17/19  0750 11/16/19  0750 11/15/19  0806 11/14/19  0753 11/13/19  0804   INR 1.95* 1.66* 1.35* 1.23* 1.21* 1.27*   - Continue warfarin with heparin gtt bridging, with ultimate goal INR (2.5-3.5).     Coronary artery disease.  Hypertension (benign essential).  [PTA: furosemide 20 mg daily; metoprolol 25 mg BID.]  S/p CABG (along with AVR) in 2006. Nuc stress 4/2019 showed no ischemia, LVEF 59%.  - Continue metoprolol, rosuvastatin.  - Holding furosemide.  - Monitor i/o's, daily wts.     Anemia, suspect chronic component.  Hgb 11.2 on admit 11/10. No overt clinical signs of major bleeding.  Recent Labs   Lab 11/16/19  0750 11/15/19  0806 11/13/19  0804   HGB 10.9* 10.6* 10.3*   - Monitor CBC periodically.    PAD.  H/o AAA stent repair.     Obesity, BMI of 32.22.  Obstructive sleep apnea.  - Pursue lifestyle modification with diet and exercise when able.  - Continue CPAP.     Hyperlipidemia.  - Continue ezetimibe and rosuvastatin.     Benign prostatic hypertrophy.  - Continue tamsulosin.     Constipation.  - Continue scheduled Miralax.    Diet: Advance Diet as Tolerated: Regular Diet Adult  Snacks/Supplements Adult: Boost Shake; Between Meals    Prophylaxis: PCD's, ambulation.    Lord Catheter: not present  Code Status: Full Code      Disposition Plan   Expected discharge: possibly tomorrow, recommended to prior living arrangement once INR >/= 2.0.  Entered: Jack Spann MD 11/18/2019, 4:25 PM         Interval History   Doing OK.  Ambulating OK.    -Data reviewed today: I reviewed all new labs and imaging over the last 24 hours. I personally reviewed no images or EKG's today.    Physical Exam   Heart Rate: (!) 49, Blood pressure 116/51, pulse 50, temperature 98.2  F (36.8  C), temperature source Oral, resp. rate 16, weight 91.1 kg (200 lb 12.8 oz), SpO2 98 %.  Vitals:    11/10/19 1530 11/18/19 0839   Weight: 90.5 kg (199 lb 8.3 oz) 91.1 kg (200 lb 12.8 oz)     Vital Signs with Ranges  Temp:  [97  F (36.1  C)-98.2  F (36.8  C)] 98.2  F (36.8  C)  Pulse:  [46-50] 50  Heart Rate:  [46-50] 49  Resp:  [15-16] 16  BP: (100-126)/(51-63) 116/51  SpO2:  [92 %-98 %] 98 %  Patient Vitals for the past 24 hrs:   BP Temp Temp src Pulse Heart Rate Resp SpO2 Weight   11/18/19 1552 116/51 98.2  F (36.8  C) Oral 50 (!) 49 16 98 % --   11/18/19 0845 -- -- -- -- -- 16 -- --   11/18/19 0839 -- -- -- -- -- -- -- 91.1 kg (200 lb 12.8 oz)   11/18/19 0803 -- -- -- -- -- 15 -- --   11/18/19 0731 117/63 97  F (36.1  C) Oral 50 -- 16 92 % --   11/18/19 0000 100/53 98.1  F (36.7  C) Oral (!) 46 (!) 46 -- 95 % --   11/17/19 2050 126/57 98  F (36.7  C) Oral (!) 48 50 16 98 % --     I/O's Last 24 hours  I/O last 3 completed shifts:  In: 1384 [P.O.:1140; I.V.:244]  Out: 316 [Urine:300; Drains:16]    Constitutional: Alert, oriented.  Respiratory: Diminished in bases. No crackles or wheezes.  Cardiovascular: RRR, mechanical valve sounds.  GI: Soft, nt, nd, +BS.  Skin/Integumen:   Other:        Data   Recent Labs   Lab 11/18/19  0817 11/17/19  0750 11/16/19  0750 11/15/19  0806 11/14/19  0753 11/13/19  0804   WBC  --   --  10.9 12.0*  --  12.7*   HGB  --   --  10.9* 10.6*  --  10.3*   MCV  --   --  90 90  --  88      --  371 335  --  337   INR 1.95* 1.66* 1.35* 1.23* 1.21* 1.27*   NA  --   --   --  137  --  135   POTASSIUM  --   --   --  3.9  --  4.4   CHLORIDE  --   --   --  102  --  103   CO2  --   --   --  30  --  27   BUN  --   --   --  14  --  14   CR  --   --   --  0.85  --  0.79   ANIONGAP  --   --   --  5  --  5   AWILDA  --   --   --  8.6  --  8.8   GLC  --   --   --  139* 104* 125*   ALBUMIN  --   --   --   --   --  2.9*   PROTTOTAL  --   --   --   --   --  6.6*   BILITOTAL  --   --   --   --   --  0.3   ALKPHOS  --   --   --   --   --  57   ALT  --   --   --   --   --  18   AST  --   --   --   --   --  22     Recent Labs   Lab Test 11/15/19  0806 11/14/19  0753 11/13/19  0804 11/10/19  1509 07/24/19  0648  05/19/18  0142  10/31/15  0556 10/30/15  0808  10/23/13  1118  10/22/13  2051   * 104* 125* 93 127*   < >  --    < >  --   --    < >  --    < >  --    BGM  --   --   --   --   --   --  94  --  131* 111*  --  114*  --  119*    < > = values in this interval not displayed.         No results found for this or any previous visit (from the past 24 hour(s)).    Medications   All medications were reviewed.    HEParin 1,500 Units/hr (11/18/19 1602)     Warfarin Therapy Reminder         ezetimibe  10 mg Oral QPM     ferrous sulfate  325 mg Oral Daily with breakfast     mesalamine  800 mg Oral BID     mineral oil-hydrophilic petrolatum   Topical Daily     multivitamin w/minerals  1 tablet Oral Daily     polyethylene glycol  17 g Oral Daily     rosuvastatin  40 mg Oral QPM     sodium chloride (PF)  3 mL Intracatheter Q8H     tamsulosin  0.4 mg Oral QPM     warfarin ANTICOAGULANT  10 mg Oral ONCE at 18:00

## 2019-11-18 NOTE — PROVIDER NOTIFICATION
Dr. Joaquin notified of EKG: sinus rhythm with AV dissociation and wide QRS rhythm with occasional PVC. Patient is asymptomatic and vitals are unchanged.    Update: provider will order cardiology consult, also discontinued metoprolol

## 2019-11-18 NOTE — PROGRESS NOTES
Cross cover note    Given persistent bradycardia and abnormal telemetry twelve-lead EKG was done by nursing.  Noted to have sinus rhythm with A-V dissociation with wide QRS rhythm.  Patient currently asymptomatic.  Vitals stable  Consult cardiology.  Discontinued metoprolol(of note patient has not received metoprolol for the last 2 days given persistent bradycardia)

## 2019-11-18 NOTE — PROGRESS NOTES
Dictated.  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  Now with intermittent second-degree AVB and bradycardia in the 30s-40s.   He has brief intermittent lightheadedness (<10 sec) suspicious for sx lashae-.  Would not implant PM right away (it is not emergent and the pt has open wounds...)     Plan:  - agree with stopping metoprolol   - we will see him in the EP clinic in 1 month  - please send home with 30-day cardiac monitor

## 2019-11-18 NOTE — CONSULTS
"Consult Date:  11/18/2019      CARDIAC ELECTROPHYSIOLOGY CONSULTATION      REASON FOR CONSULTATION:  Bradycardia.       REQUESTING PHYSICIAN:  Dr. Roge Jones, Hospitalist.      HISTORY OF PRESENT ILLNESS:    It is my pleasure seeing Mr. Fausto Farr, a 79-year-old gentleman, for bradycardia.  Mr. Farr was admitted to the hospital for debridement and skin grafting of a non-healing left lower leg wound.  Postoperatively, he was noted to be bradycardic.  In reviewing rhythm strips, second-degree AV block, often with 2:1 AV conduction, is apparent.      Mr. Farr has a long history of valvular heart disease with AVR in 2006 and subsequent perivalvular leak with redo mechanical AVR and concomitant mechanical MVR in 2013.  He had bypass (LIMA to LAD and radial graft to RCA) in 2006.  He had previous endovascular AAA repair.  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  He had typical atrial flutter and I was involved in his catheter ablation procedure last April.  He has been followed in our clinic by Dr. Santo.  Other chronic medical issues include obstructive sleep apnea with use of CPAP, hypertension, dyslipidemia, chronic back pain, venous varicosities.      The patient has had brief episodes of lightheadedness lately.  These last no longer than 10 seconds.  He has not felt close to fainting.  In fact, he has not had syncope since an isolated incident in 02/2019 while visiting [place], [place].  At that time he suddenly became very weak.  He was standing when his \"legs went limp\" and he ended up on the ground.  He could hear everything that happened and does not think he completely blacked out.  Shortly thereafter, he developed nausea and vomiting and his speech was slurred.  He was brought to the emergency room in [place].  It was not felt that he had a stroke or a TIA.  The etiology for this event remains unclear.      More recently, the patient has been troubled " by the nonhealing right leg wound.  Apparently he was infected and he had been treated with antibiotics.  He has had no chest pain, orthopnea, PND, or more than his usual lower extremity edema.        DIAGNOSTIC STUDIES:    Sodium 137, potassium 3.9, creatinine 0.85, INR 1.95.  Hematocrit 34%, white count 10,900.      I have reviewed his 12-lead ECG as well as rhythm strips that were completed in the last 24 hours.  These show sinus rhythm with first-degree AV conduction delay, RBBB and LAFB (chronic findings)   but with clear periods of second-degree AV block with 2:1 AV conduction.      Most recent echocardiogram was in 2018, showing an EF of 55% - 60%, abnormal septal motion, adequate function of the mechanical aortic and mitral valves.        IMPRESSION:   1.  Second-degree atrioventricular block, likely Mobitz II.  Mr. Farr has known chronic AV conduction disease with bifascicular block and first-degree AV conduction delay.  It appears that AV conduction disease has progressed to the point that he now has second-degree AV block with sudden bradycardia in the high 30s and 40s during this admission.  He had been on metoprolol tartrate 25 mg b.i.d. prior to admission, but this has been held for the past 48 hours.      Given his bradycardia last night (approximately 48 hours after the last dose of metoprolol), I strongly suspect the patient will require a permanent pacemaker as he appears to be symptomatic with intermittent lightheadedness.  However, because of his recent surgery and open leg wound, I would wait to plan the procedure until he is fully healed.      At the moment the patient is awaiting for his INR to become therapeutic (he is on IV heparin), as he has 2 mechanical heart valves.      RECOMMENDATIONS:   A.  Stop metoprolol.   B.  Hook up a cardiac event monitor prior to hospital discharge.   C. I will make arrangements to see him in the clinic in 1 month.  Depending on the status of wound healing, we  will make arrangements for permanent pacemaker implantation.   ARABELLA  I encouraged the patient to call us immediately if he has syncope.      I do appreciate the opportunity to be a part of his care.         SHALA RIOS MD, [facility]          Physical Exam:  Vitals: /51 (BP Location: Left arm)   Pulse 50   Temp 98.2  F (36.8  C) (Oral)   Resp 16   Wt 91.1 kg (200 lb 12.8 oz)   SpO2 98%   BMI 32.43 kg/m      Intake/Output Summary (Last 24 hours) at 11/18/2019 1842  Last data filed at 11/18/2019 1800  Gross per 24 hour   Intake 1864 ml   Output 316 ml   Net 1548 ml     Vitals:    11/10/19 1530 11/18/19 0839   Weight: 90.5 kg (199 lb 8.3 oz) 91.1 kg (200 lb 12.8 oz)     Constitutional:  A+O x3.  Pt is in NAD.  HEAD: Normocephalic.  SKIN: Skin normal color, texture and turgor with no lesions or eruptions.  Eyes:  PERRL, EOMI.  ENT:  Supple, normal JVP. No lymphadenopathy or thyroid enlargement.  Chest:  CTA bilat, no rales or wheezing.  Cardiac:  Metallic S1-S2 (closing mechanical MV and AV sounds), 1/6 MARI, no S3.  .  Abdomen:  Normal BS.  Soft, non-tender and non-distended.  No rebound or guarding.    Extremities:  Radial/brachial pulses 1-2+B/L.  No LE edema noted.   Neurological:  Strength and sensation grossly symmetric and intact throughout.         Review of Systems:  Complete review of system is otherwise negative with the exception of what was described above.       CURRENT MEDICATIONS:    ezetimibe  10 mg Oral QPM     ferrous sulfate  325 mg Oral Daily with breakfast     mesalamine  800 mg Oral BID     mineral oil-hydrophilic petrolatum   Topical Daily     multivitamin w/minerals  1 tablet Oral Daily     polyethylene glycol  17 g Oral Daily     rosuvastatin  40 mg Oral QPM     sodium chloride (PF)  3 mL Intracatheter Q8H     tamsulosin  0.4 mg Oral QPM       ALLERGIES  Allergies   Allergen Reactions     Bees Anaphylaxis       PAST MEDICAL HISTORY:  Past Medical History:   Diagnosis Date      Abdominal pain      Abnormal ECG     RBBB, 1st degree AVB, Left axis deviation     Anemia     currently taking iron     Arrhythmia     pac, pvc     Back pain     since 1980     BPH (benign prostatic hyperplasia)      Bruit      CAD (coronary artery disease)      Cellulitis 10/18/12     Cellulitis 05/2018    GrpB strep LLE cellulitis  negative RACHAEL for veg     Chronic venous insufficiency     bilat lower extremities     Contact dermatitis and other eczema, due to unspecified cause      Diaphragmatic hernia without mention of obstruction or gangrene      Diastolic HF (heart failure) (H)      Gastric ulcer      Glucose intolerance (impaired glucose tolerance)      Heart murmur 9/16/13    valvular heart disease     Hyperlipidemia LDL goal <100 8/6/2013     Hypertension 8/6/13     Lumbago      Malaise and fatigue      Metabolic syndrome      Mobitz (type) I (Wenckebach's) atrioventricular block     and RBBB     Nocturia 10/18/12     Nonallopathic lesion of cervical region      Nonallopathic lesion of lumbar region      Nonallopathic lesion of pelvic region, not elsewhere classified      Nonallopathic lesion of rib cage      Nonallopathic lesion of sacral region      GAGE (obstructive sleep apnea)     CPAP     Paroxysmal atrial fibrillation (H) 10/18/12     Prostate cancer (H) 2008    radiation seed, XRT      PVD (peripheral vascular disease) (H)      RBBB     1st degree AVB, RBBB, LAHB     Rotator cuff strain     and sprain     S/P AAA repair      S/P aortic valve replacement 2006    developed perivalve leak and MS, therefore redo surg 2013     S/P CABG (coronary artery bypass graft) 2006    Lima-Lad, RA-Rca     Sciatica of left side     since 2000     Sepsis due to group B Streptococcus (H) 5/19/2018     Ulcerative colitis (H)      Varicose veins of bilateral lower extremities with other complications     s/p RLE vein stripping     Vitamin D deficiency        PAST SURGICAL HISTORY:  Past Surgical History:   Procedure  Laterality Date     AORTIC VALVE REPLACEMENT  1/3/06    redo AVR SJM 21mm and SJM MVR 27mm in 2013SJM 21([id number]):AVR, SJM 27 :MVR-     APPLY WOUND VAC N/A 11/12/2019    Procedure: APPLICATION, WOUND VAC;  Surgeon: Sara Martinez MD;  Location:  OR     ARTHROPLASTY KNEE      right knee     ARTHROPLASTY KNEE Right 7/22/2019    Procedure: Right total knee arthroplasty;  Surgeon: Prasanth Jensen MD;  Location: [facility] OR     BACK SURGERY  Oct 2015    Fusion L4-5, laminectomy L2, L3     BYPASS GRAFT ARTERY CORONARY  10/2013    reimplantation of radial artery graft to RCA     C CABG, VEIN, TWO  1/3/06    Left radial to RCA, LIMA to LAD (RA to RCA reimplanted at time of 2013 surg)     CARDIAC CATHERIZATION  11/2005    Stent placed to RCA     CARDIAC CATHERIZATION  04/2013    Occluded RCA, patent LIMA to LAD and radial graft to PDA     CARPAL TUNNEL RELEASE RT/LT  1994     COLONOSCOPY  8-22-11     CYSTOSCOPY FLEXIBLE  10/16/2013    Procedure: CYSTOSCOPY FLEXIBLE;  FLEXIBLE CYSTOSCOPY / DILATION OF URETHRA / INSERTION OF LESLIE;  Surgeon: Cooper Wallace MD;  Location:  OR     ENDOVASCULAR REPAIR, INFRARENAL ABDOMINAL AORTIC ANEURYSM/DISSECTION; MODULAR BIFURCATED PROSTHESIS  2006    AAA repair endovascular     ENT SURGERY       EP ABLATION ATRIAL FLUTTER N/A 4/22/2019    Procedure: EP Ablation Atrial Flutter;  Surgeon: Jessy Vasquez MD;  Location:  HEART CARDIAC CATH LAB     GENITOURINARY SURGERY  6/16/08    radioactive seeding     GRAFT FLAP PEDICLE EXTREMITY (LOCATION) Right 11/12/2019    Procedure: SURGICAL PROCUREMENT, FLAP, PEDICLE, EXTREMITY;  Surgeon: Sara Martinez MD;  Location: [facility] OR     GRAFT SKIN SPLIT THICKNESS FROM EXTREMITY Right 11/12/2019    Procedure: RIGHT GASTRONEMIUS FLAP TO RIGHT KNEE, SPLIT THICKNESS SKIN GRAFT FROM RIGHT THIGH TO RIGHT KNEE, SURGICAL PROCUREMENT, FLAP, PEDICLE, EXTREMITY, WOUND VAC PLACEMENT;  Surgeon: Sara Martinez MD;   Location: [facility] OR     HEAD & NECK SURGERY  1997    vocal cord polypectomy     INCISION AND DRAINAGE KNEE, COMBINED Right 8/29/2019    Procedure: INCISION AND DRAINAGE, KNEE W/ Secondary Wound Closure;  Surgeon: Prasanth Jensen MD;  Location: [facility] OR     KNEE SURGERY  2001 Right knee arthroscopy     OPTICAL TRACKING SYSTEM FUSION SPINE POSTERIOR LUMBAR THREE+ LEVELS N/A 10/29/2015    Procedure: OPTICAL TRACKING SYSTEM FUSION SPINE POSTERIOR LUMBAR THREE+ LEVELS;  Surgeon: Walt Garcia MD;  Location: [facility] OR     PROSTATE SURGERY      radioactive seeding 6/16/08     REPAIR ANEURYSM ABDOMINAL AORTA  06/08     REPAIR VALVE MITRAL  10/16/2013    SJM 21([id number] [id number]):AVR, SJM 27 :MVRProcedure: REPAIR VALVE MITRAL;  REDO STERNOTOMY/REDO AORTIC VALVE REPLACEMENT/ MITRAL VALVE REPLACEMENT/REIMPLANTATION OF RIGHT CORONARY ARTERY BYPASS WITH RACHAEL ( ON PUMP);  Surgeon: Viet Singh MD;  Location:  OR     REPLACE VALVE AORTIC  10/16/2013    Procedure: REPLACE VALVE AORTIC;;  Surgeon: Viet Singh MD;  Location: [facility] OR     SURGERY GENERAL IP CONSULT  05/2008 Excision aneurysm abdominal aorta     SURGERY GENERAL IP CONSULT  1997 Vocal cord polypectomy     VASCULAR SURGERY  1968, 1993     varicose vein stripping       FAMILY HISTORY:  Family History   Problem Relation Age of Onset     No Known Problems Mother      Coronary Artery Disease Father         CABG     Heart Disease Father         Pacemaker     No Known Problems Brother      No Known Problems Sister      No Known Problems Son      Other Cancer Daughter      No Known Problems Daughter      Heart Disease Brother      Other - See Comments Grandchild        SOCIAL HISTORY:  Social History     Socioeconomic History     Marital status:      Spouse name: None     Number of children: None     Years of education: None     Highest education level: None   Occupational History     None   Social Needs     Financial resource strain: None     Food  insecurity:     Worry: None     Inability: None     Transportation needs:     Medical: None     Non-medical: None   Tobacco Use     Smoking status: Former Smoker     Packs/day: 1.00     Years: 40.00     Pack years: 40.00     Start date: 4/15/1962     Last attempt to quit: 10/23/2002     Years since quittin.0     Smokeless tobacco: Never Used   Substance and Sexual Activity     Alcohol use: Yes     Comment: a couple beers per week (socially)     Drug use: No     Sexual activity: Never   Lifestyle     Physical activity:     Days per week: None     Minutes per session: None     Stress: None   Relationships     Social connections:     Talks on phone: None     Gets together: None     Attends Religion service: None     Active member of club or organization: None     Attends meetings of clubs or organizations: None     Relationship status: None     Intimate partner violence:     Fear of current or ex partner: None     Emotionally abused: None     Physically abused: None     Forced sexual activity: None   Other Topics Concern     Parent/sibling w/ CABG, MI or angioplasty before 65F 55M? Yes     Comment: Brother had bypass at 55      Service Not Asked     Blood Transfusions Not Asked     Caffeine Concern No     Comment: 6-8 cups of half and half per day     Occupational Exposure Not Asked     Hobby Hazards Not Asked     Sleep Concern Not Asked     Stress Concern Not Asked     Weight Concern Not Asked     Special Diet No     Back Care Not Asked     Exercise No     Bike Helmet Not Asked     Seat Belt Not Asked     Self-Exams Not Asked   Social History Narrative     None         Recent Lab Results:  Recent Labs   Lab 19  0817 19  0750 19  0750 11/15/19  0806 19  0753 19  0804   WBC  --   --  10.9 12.0*  --  12.7*   HGB  --   --  10.9* 10.6*  --  10.3*   MCV  --   --  90 90  --  88     --  371 335  --  337   INR 1.95* 1.66* 1.35* 1.23* 1.21* 1.27*   NA  --   --   --  137  --   135   POTASSIUM  --   --   --  3.9  --  4.4   CHLORIDE  --   --   --  102  --  103   CO2  --   --   --  30  --  27   BUN  --   --   --  14  --  14   CR  --   --   --  0.85  --  0.79   ANIONGAP  --   --   --  5  --  5   AWILDA  --   --   --  8.6  --  8.8   GLC  --   --   --  139* 104* 125*   ALBUMIN  --   --   --   --   --  2.9*   PROTTOTAL  --   --   --   --   --  6.6*   BILITOTAL  --   --   --   --   --  0.3   ALKPHOS  --   --   --   --   --  57   ALT  --   --   --   --   --  18   AST  --   --   --   --   --  22            D: 2019   T: 2019   MT: NTS      Name:     LIANG VAUGHN   MRN:      [id number]        Account:       [id number]   :      1940           Consult Date:  2019      Document: [id number]       cc: Jodi Santo MD

## 2019-11-18 NOTE — PLAN OF CARE
Alert and oriented x4. Vital signs stable on room air, bradycardic. Assist of 1 + gait belt and walker. Tolerating regular diet. Lung sounds diminished. Bowel sounds active, + flatus, BM today. Adequate urine output. Tagaderm on skin graft intact, sanguinous output present. Dressing on right knee changed, CDI. Aquaphor applied to posterior calf incision, covered by dressing on right knee. Pain managed with PRN norco. Denies nausea. EKG sinus rhythm with AV dissociation and wide QRS rhythm with occasional PVC, see provider notification note.

## 2019-11-18 NOTE — PLAN OF CARE
A/Ox4. VSS on ra ex bradycardic. Ax1 GB and walker. Tolerating regular diet. BS: active, flatus +. LS: dim. Adequate urine output. Tagaderm on skin graft, sanguinous output. Right site knee dressing guaze and ace wrap intact, clean. Posterior calf incision covered with ace wrap, UTV. RON drain to bulb suction with bloody output. Pain managed with PRN NORCO. Denies nausea. Tele: Sinus lashae w/ PVC, BBB, dissociated p waves, & 1st degree AV block. Plan to discharge once therapeutic INR. Will continue to monitor.

## 2019-11-18 NOTE — PLAN OF CARE
AxO x4. VSS. Pain managed with PRN [place]. Denies nausea. New dressing applied, CDI.  Maintaining regular diet. SBA. CMS intact. Heparin infusing. RON drain to bulb suction, stripped, patent. Tele, sinus lashae, intermittent second-degree AVB. INR 1.95 this am.    Plan to discharge once INR is at therapeutic level. Per Dr. Jones note, home when INR is >2 with goal 2.5-3.5.

## 2019-11-19 ENCOUNTER — APPOINTMENT (OUTPATIENT)
Dept: CARDIOLOGY | Facility: CLINIC | Age: 79
DRG: 904 | End: 2019-11-19
Attending: INTERNAL MEDICINE
Payer: MEDICARE

## 2019-11-19 ENCOUNTER — TELEPHONE (OUTPATIENT)
Dept: PEDIATRICS | Facility: CLINIC | Age: 79
End: 2019-11-19

## 2019-11-19 VITALS
OXYGEN SATURATION: 97 % | SYSTOLIC BLOOD PRESSURE: 125 MMHG | WEIGHT: 200.8 LBS | DIASTOLIC BLOOD PRESSURE: 54 MMHG | TEMPERATURE: 98 F | BODY MASS INDEX: 32.43 KG/M2 | RESPIRATION RATE: 17 BRPM | HEART RATE: 56 BPM

## 2019-11-19 DIAGNOSIS — Z79.01 LONG TERM CURRENT USE OF ANTICOAGULANT THERAPY: ICD-10-CM

## 2019-11-19 DIAGNOSIS — I48.91 AF (ATRIAL FIBRILLATION) (H): ICD-10-CM

## 2019-11-19 DIAGNOSIS — Z95.2 S/P MITRAL VALVE REPLACEMENT: ICD-10-CM

## 2019-11-19 LAB
ERYTHROCYTE [DISTWIDTH] IN BLOOD BY AUTOMATED COUNT: 14.3 % (ref 10–15)
HCT VFR BLD AUTO: 33.1 % (ref 40–53)
HGB BLD-MCNC: 10.4 G/DL (ref 13.3–17.7)
INR PPP: 2.42 (ref 0.86–1.14)
LMWH PPP CHRO-ACNC: <0.1 IU/ML
MCH RBC QN AUTO: 28 PG (ref 26.5–33)
MCHC RBC AUTO-ENTMCNC: 31.4 G/DL (ref 31.5–36.5)
MCV RBC AUTO: 89 FL (ref 78–100)
PLATELET # BLD AUTO: 429 10E9/L (ref 150–450)
RBC # BLD AUTO: 3.72 10E12/L (ref 4.4–5.9)
WBC # BLD AUTO: 8.9 10E9/L (ref 4–11)

## 2019-11-19 PROCEDURE — 36415 COLL VENOUS BLD VENIPUNCTURE: CPT | Performed by: PLASTIC SURGERY

## 2019-11-19 PROCEDURE — 99239 HOSP IP/OBS DSCHRG MGMT >30: CPT | Performed by: INTERNAL MEDICINE

## 2019-11-19 PROCEDURE — 85027 COMPLETE CBC AUTOMATED: CPT | Performed by: PLASTIC SURGERY

## 2019-11-19 PROCEDURE — 93272 ECG/REVIEW INTERPRET ONLY: CPT | Performed by: INTERNAL MEDICINE

## 2019-11-19 PROCEDURE — 93270 REMOTE 30 DAY ECG REV/REPORT: CPT

## 2019-11-19 PROCEDURE — 25000132 ZZH RX MED GY IP 250 OP 250 PS 637: Mod: GY | Performed by: PLASTIC SURGERY

## 2019-11-19 PROCEDURE — 85610 PROTHROMBIN TIME: CPT | Performed by: PLASTIC SURGERY

## 2019-11-19 PROCEDURE — 25000132 ZZH RX MED GY IP 250 OP 250 PS 637: Mod: GY | Performed by: PHYSICIAN ASSISTANT

## 2019-11-19 PROCEDURE — 85520 HEPARIN ASSAY: CPT | Performed by: PLASTIC SURGERY

## 2019-11-19 PROCEDURE — 25000132 ZZH RX MED GY IP 250 OP 250 PS 637: Mod: GY | Performed by: HOSPITALIST

## 2019-11-19 RX ORDER — POLYETHYLENE GLYCOL 3350 17 G/17G
17 POWDER, FOR SOLUTION ORAL DAILY PRN
Qty: 30 PACKET | Refills: 0 | Status: SHIPPED | OUTPATIENT
Start: 2019-11-19 | End: 2019-12-07

## 2019-11-19 RX ORDER — AMOXICILLIN 250 MG
1-2 CAPSULE ORAL 2 TIMES DAILY PRN
Qty: 60 TABLET | Refills: 0 | Status: ON HOLD | OUTPATIENT
Start: 2019-11-19 | End: 2020-02-10

## 2019-11-19 RX ORDER — METHOCARBAMOL 750 MG/1
750 TABLET, FILM COATED ORAL EVERY 6 HOURS PRN
Qty: 30 TABLET | Refills: 0 | Status: SHIPPED | OUTPATIENT
Start: 2019-11-19 | End: 2019-11-26

## 2019-11-19 RX ADMIN — POLYETHYLENE GLYCOL 3350 17 G: 17 POWDER, FOR SOLUTION ORAL at 09:05

## 2019-11-19 RX ADMIN — FERROUS SULFATE TAB 325 MG (65 MG ELEMENTAL FE) 325 MG: 325 (65 FE) TAB at 09:05

## 2019-11-19 RX ADMIN — WHITE PETROLATUM: 1.75 OINTMENT TOPICAL at 09:05

## 2019-11-19 RX ADMIN — MULTIPLE VITAMINS W/ MINERALS TAB 1 TABLET: TAB at 09:05

## 2019-11-19 RX ADMIN — MESALAMINE 800 MG: 800 TABLET, DELAYED RELEASE ORAL at 09:05

## 2019-11-19 RX ADMIN — METHOCARBAMOL TABLETS 750 MG: 750 TABLET, COATED ORAL at 09:05

## 2019-11-19 RX ADMIN — HYDROCODONE BITARTRATE AND ACETAMINOPHEN 2 TABLET: 5; 325 TABLET ORAL at 05:35

## 2019-11-19 ASSESSMENT — ACTIVITIES OF DAILY LIVING (ADL)
ADLS_ACUITY_SCORE: 14

## 2019-11-19 NOTE — DISCHARGE SUMMARY
[facility]  Discharge Summary        Fausto Farr MRN# [id number]   YOB: 1940 Age: 79 year old     Date of Admission: 11/10/2019  Date of Discharge: 11/19/2019  Admitting Physician: Sara Martinez MD  Discharge Physician: Jack Spann MD     Primary Provider: Jodi Flower Mai  Primary Care Physician Phone Number: [phone]         Discharge Diagnoses:   1. 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  2. Status post mechanical aortic and mitral valve replacements.  3. Anemia, suspect chronic component.  4. Bradycardia postoperatively related to chronic conduction disease with worsening due to med effect.        Other Chronic Medical Problems:      1. Hypertension (benign essential).  2. Coronary artery disease.  3. PAD. H/o AAA stent repair.  4. Ulcerative colitis.  5. Hyperlipidemia.  6. Atrial fibrillation and atrial flutter, status post ablation (4/2019).  7. Obstructive sleep apnea.  8. Benign prostatic hypertrophy.  9. Obesity, BMI of 32.22.       Allergies:         Allergies   Allergen Reactions     Bees Anaphylaxis           Discharge Medications:        Current Discharge Medication List      START taking these medications    Details   HYDROcodone-acetaminophen (NORCO) 5-325 MG tablet Take 1-2 tablets by mouth every 4 hours as needed  Qty: 30 tablet, Refills: 0    Associated Diagnoses: Open wound of right knee, initial encounter      methocarbamol (ROBAXIN) 750 MG tablet Take 1 tablet (750 mg) by mouth every 6 hours as needed for muscle spasms  Qty: 30 tablet, Refills: 0    Associated Diagnoses: Open wound of right knee, initial encounter      polyethylene glycol (MIRALAX/GLYCOLAX) packet Take 17 g by mouth daily as needed for constipation  Qty: 30 packet, Refills: 0     Associated Diagnoses: Slow transit constipation      senna-docusate (SENOKOT-S/PERICOLACE) 8.6-50 MG tablet Take 1-2 tablets by mouth 2 times daily as needed for constipation  Qty: 60 tablet, Refills: 0    Associated Diagnoses: Slow transit constipation         CONTINUE these medications which have NOT CHANGED    Details   acetaminophen (TYLENOL) 325 MG tablet Take 1-2 tablets (325-650 mg) by mouth every 6 hours as needed for mild pain  Qty: 250 tablet, Refills: 11      betamethasone valerate (VALISONE) 0.1 % external lotion Apply topically 2 times daily Apply topically to scalp twice daily PRN  Qty: 60 mL, Refills: 3    Associated Diagnoses: Seborrheic dermatitis      cholecalciferol 25 MCG (1000 UT) TABS Take 1,000 Units by mouth daily      ezetimibe (ZETIA) 10 MG tablet Take 1 tablet (10 mg) by mouth daily  Qty: 90 tablet, Refills: 3    Associated Diagnoses: Mixed hyperlipidemia      ferrous sulfate (FEROSUL) 325 (65 Fe) MG tablet Take 325 mg by mouth daily (with breakfast)      fluocinonide (LIDEX) 0.05 % external ointment Apply topically 2 times daily as needed      furosemide (LASIX) 40 MG tablet Take 0.5 tablets (20 mg) by mouth daily  Qty: 45 tablet, Refills: 3    Comments: Profile Rx: patient will contact pharmacy when needed  Associated Diagnoses: Chronic diastolic congestive heart failure (H)      glucosamine-chondroitin 500-400 MG CAPS per capsule Take 1 capsule by mouth 2 times daily      mesalamine (ASACOL HD) 800 MG EC tablet Take 1 tablet (800 mg) by mouth 2 times daily  Qty: 180 tablet, Refills: 11    Associated Diagnoses: Ulcerative proctitis without complication (H)      rosuvastatin (CRESTOR) 40 MG tablet Take 1 tablet (40 mg) by mouth daily  Qty: 90 tablet, Refills: 3    Associated Diagnoses: Hyperlipidemia, unspecified hyperlipidemia type      tamsulosin (FLOMAX) 0.4 MG capsule TAKE 1 CAPSULE BY MOUTH EVERY DAY  Qty: 90 capsule, Refills: 3    Comments: Profile Rx: patient will contact  pharmacy when needed  Associated Diagnoses: Urinary retention      warfarin ANTICOAGULANT (COUMADIN) 5 MG tablet Take 5 mg (1 tablet) every Mon, Wed, Fri; 7.5 mg (1.5 tablets) all other days or as directed by INR Clinic  Qty: 120 tablet, Refills: 3    Comments: Dose change  Associated Diagnoses: S/P aortic valve replacement; S/P mitral valve replacement; AF (atrial fibrillation) (H)         STOP taking these medications       cefadroxil (DURICEF) 500 MG capsule Comments:   Reason for Stopping:         metoprolol tartrate (LOPRESSOR) 25 MG tablet Comments:   Reason for Stopping:                   Discharge Instructions and Follow-Up:      Discharge Orders      Discharge Order: F/U with Cardiac  AURELIA      Home care nursing referral      Reason for your hospital stay    Surgery.     Wound care and dressings    Keep right leg dry. Change/reinforce tegaderm on thigh prn drainage. [employer] or xeroform to right knee, cover with kerlix roll and secure with ace wrap; change daily. Strip drain daily and record output daily. Aquaphor to posterior calf incision daily.     Discharge Instructions    Follow up with Dr. Martinez in 1 weeks.     Activity    Weight bear as tolerated right leg. Knee immobilized to right leg when ambulating.     Follow-up and recommended labs and tests    Follow-up with primary care provider, Chris Flower, within 1 week for hospital follow-up with CBC and INR.  Follow-up with warfarin clinic this week for INR check; further adjustments (if needed) to warfarin dosing per warfarin clinic.  Follow-up with [facility] EP Cardiology AURELIA 1 month.     Cardiac Event Monitor Adult Pediatric    Bradycardia/SSS.     Diet    Follow this diet upon discharge: Orders Placed This Encounter      Snacks/Supplements Adult: Boost Shake; Between Meals      Advance Diet as Tolerated: Regular Diet Adult             Consultations This Hospital Stay:      Internal Medicine.        Admission History:      Please see the H&P by  Sara Martinez MD on 11/10/2019 for complete details. Briefly, Fausto Farr is a 79-year-old male with medical history including obesity; ulcerative colitis; CAD; HTN; afib/flutter; and mechanical AVR and MVR on warfarin; who has had a delayed healing with persistent wound and tendon visible following a total knee arthroplasty (7/2019) that required surgery. Admitted 11/10/2019 for bridging anticoagulation prior to surgery.        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  * Had R TKA 7/22/2019. Later suffered wound breakdown, right knee, and underwent I&D and secondary wound closure 8/29/2019. Developed non-healing wound with visible tendon and referred to Plastics. PTA on cefadroxil  * Was continued on cephalexin (started 11/10 as formulary alternative to cefadroxil which pt was on). Underwent surgical flap with skin graft 11/12/2019 by Plastic Surgery. Completed cephalexin 11/16.  - Continue wound cares per Plastics.  - Follow-up with Plastic Surgery.    Atrial fibrillation and atrial flutter, status post ablation (4/2019).  Bradycardia postoperatively related to chronic conduction disease with worsening due to med effect.  Preop EKG A. fib with left axis deviation, right bundle branch block. Post procedure patient on bedrest, received Norco for pain, heart rate in 40s and 50s. Denied any chest pain or shortness of breath or dizziness or headache or palpitations. Noted with intermittent second degree AVB and bradycardia to 30's-40's. PTA metoprolol stopped 11/17 (multiple doses had been held prior due to HR parameters). Seen by EP and felt pt may have had some symptoms and PPM recommended but not immediately given recent surgery.  - Metoprolol stopped.  - Discharge on 30d  cardiac monitor.  - Follow-up with EP outpatient.    Status post mechanical aortic and mitral valve replacements on anticoagulation.  * Originally had AVR (along with CABG) 2006. Underwent redo sternotomy with mechanical AVR and MVR 2013. Historically on chronic anticoagulation with warfarin with a goal INR of 2.5 to 3.5. Had been off warfarin since Friday, 11/08/2019 in anticipation of needing bridging for surgery.   * INR 1.65 on admit 11/10. Started on bridging heparin gtt post surgery on 11/12. Restarted warfarin 11/13.    Recent Labs   Lab 11/19/19  0622 11/18/19  0817 11/17/19  0750 11/16/19  0750 11/15/19  0806 11/14/19  0753   INR 2.42* 1.95* 1.66* 1.35* 1.23* 1.21*   - Continue warfarin with goal INR (2.5-3.5).  - Follow-up warfarin clinic this week for INR check.     Coronary artery disease.  Hypertension (benign essential).  [PTA: furosemide 20 mg daily; metoprolol 25 mg BID.]  S/p CABG (along with AVR) in 2006. Nuc stress 4/2019 showed no ischemia, LVEF 59%.  - Continue metoprolol, rosuvastatin.  - Resume furosemide at discharge   - Monitor daily wts.     Anemia, suspect chronic component.  Hgb 11.2 on admit 11/10. No overt clinical signs of major bleeding.  Recent Labs   Lab 11/19/19  0622 11/16/19  0750 11/15/19  0806 11/13/19  0804   HGB 10.4* 10.9* 10.6* 10.3*     PAD.  H/o AAA stent repair.     Obesity, BMI of 32.22.  Obstructive sleep apnea.  - Pursue lifestyle modification with diet and exercise when able.  - Continue CPAP.     Hyperlipidemia.  - Continue ezetimibe and rosuvastatin.     Benign prostatic hypertrophy.  - Continue tamsulosin.     Ulcerative colitis.  - Continue mesalamine.    Constipation.  - Continue scheduled Miralax.        Pending Results:        Unresulted Labs Ordered in the Past 30 Days of this Admission     No orders found from 10/11/2019 to 11/11/2019.                Discharge Disposition:      Discharged to home.        Discharge Time:      Greater than 30 minutes.         Key Imaging Studies, Lab Findings and Procedures/Surgeries:        Surgery 11/14/2019:  PROCEDURES:   1.  Right gastrocnemius muscle transfer to right knee   2.  Split-thickness skin graft from right thigh to right knee, 10 x 6 cm.

## 2019-11-19 NOTE — PLAN OF CARE
Physical Therapy Discharge Summary    Reason for therapy discharge:    Discharged to home.    Progress towards therapy goal(s). See goals on Care Plan in [facility] electronic health record for goal details.  Goals partially met.  Barriers to achieving goals:   discharge from facility.    Therapy recommendation(s):    Per recommendations from previous treating therapist: Patient progressing towards acute PT goals, requiring only SBA for mobility. Patient will have adequate assist from wife to return home at discharge and did not demonstrate any significant difficulty with stairs.

## 2019-11-19 NOTE — PLAN OF CARE
Alert and oriented x4. Vital signs stable on room air - bradycardic at baseline. Assist of 1 + gait belt and walker. Tolerating regular diet. Lung sounds clear. Bowel sounds active, + flatus, BM today. Adequate urine output. Tagaderm on right thigh skin graft, CDI. Dressing changed on right knee, CDI. Aquaphor applied to incision on posterior right calf. Pain managed with PRN robaxin. Denies nausea. Tele sinus dysrhythmia sinus bradycardic with 1st-degree AV block and BBB. Discharge teaching complete, questions answered, and medications reviewed. Discharging to home via transportation from wife.

## 2019-11-19 NOTE — TELEPHONE ENCOUNTER
FYI  Pt discharging today; will have INR draw at home care visit on 11/22/2019.  Please call Johanny at [phone] [facility] with questions.   Thank you.  zabrina

## 2019-11-19 NOTE — PLAN OF CARE
AxO x4. VSS. Pain managed with PRN Norco. Denies nausea. RLE dressing CDI. Regular diet. SBA. CMS intact. RON drain to bulb suction, stripped, patent. Tele, sinus lashae, intermittent second-degree AVB. Heparin gtt was infusing until pt lost IV access @ 0530 AM. Pt refusing to get new IV until labs come back. Awaiting INR.Voiding with AUO.

## 2019-11-19 NOTE — CONSULTS
"Care Transition Initial Assessment - RN        Met with: Patient.  DATA   Active Problems:    Open wound of right knee       Cognitive Status: awake, alert and oriented.        Contact information and PCP information verified: Yes  Lives With: significant other   Living Arrangements: house          Insurance concerns: No Insurance issues identified  ASSESSMENT  Patient currently receives the following services:  none        Identified issues/concerns regarding health management: drain cares, dressing changes, INR management    Patient lives with spouse, 2 daughters. Supportive per report, can help.     Patient inquired about [facility] services, he wanted a nurse to come every day and change the knee dressing. \"Wife wants a professional to change the dressing\" Discussed with Dr. Martinez while rounding together in patient room. MD in agreement that dressing change can be done by family with support from [facility]. [facility] RN will change when visiting and family will need to do other changes. Patient states an understanding that family will need to be responsible for dressing changes on the days the Home RN is not there. Explained homebound status w/ patient.    PLAN  Financial costs for the patient include per insurance coverage.  Patient given options and choices for discharge yes, Pt/family was given the Medicare Compare list for Home Care, with associated star ratings (directed to Medicare web site to review) to assist with choice for referrals/discharge planning Yes    Education was given to pt/family that star ratings are updated/maintained by Medicare and can be reviewed by visiting www.medicare.gov Yes.  Patient/family is agreeable to the plan?  Yes: Choice if [place]  Patient anticipates discharging to home with Home Care Services through [place], referral sent via standard process .        Patient anticipates needs for home equipment: No  Transportation/person available to transport on day of discharge  is family and have " they been notified/set up patient to arrange  Plan/Disposition: Home   Appointments: see AVS      Care  (CTS) will continue to follow as needed.

## 2019-11-19 NOTE — PROGRESS NOTES
Plastic Surgery    Appreciate hospitalist's assistance. Patient ready for discharge but concerned about doing dressing changes at home.    PE: Temp: 98  F (36.7  C) Temp src: Oral BP: 125/54 Pulse: 56 Heart Rate: (!) 36 Resp: 17 SpO2: 97 % O2 Device: BiPAP/CPAP      Knee dressed. Donor site healing well.    A/P:  Agree with discharge. Followup with me next week.    Sara Martinez MD  Plastic Surgery  [facility]  [phone] (office)

## 2019-11-19 NOTE — PROGRESS NOTES
[facility]    Internal Medicine Hospitalist Progress Note  11/19/2019  I evaluated patient on the above date.    Jack Spann Jr., MD  [phone] (p)  Text Page        Assessment & Plan New actions/orders today (11/19/2019) are underlined.    Fausto Farr is a 79-year-old male with medical history including obesity; ulcerative colitis; CAD; HTN; afib/flutter; and mechanical AVR and MVR on warfarin; who has had a delayed healing with persistent wound and tendon visible following a total knee arthroplasty (7/2019) that required surgery. Admitted 11/10/2019 for bridging anticoagulation prior to surgery.     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  * Had R TKA 7/22/2019. Later suffered wound breakdown, right knee, and underwent I&D and secondary wound closure 8/29/2019. Developed non-healing wound with visible tendon and referred to Plastics. PTA on cefadroxil  * Was continued on cephalexin (started 11/10 as formulary alternative to cefadroxil which pt was on). Underwent surgical flap with skin graft 11/12/2019 by Plastic Surgery. Completed cephalexin 11/16.  - Post-op management per Plastics.    Atrial fibrillation and atrial flutter, status post ablation (4/2019).  Bradycardia postoperatively related to chronic conduction disease with worsening due to med effect.  Preop EKG A. fib with left axis deviation, right bundle branch block. Post procedure patient on bedrest, received Norco for pain, heart rate in 40s and 50s. Denied any chest pain or shortness of breath or dizziness or headache or palpitations. Noted with intermittent second degree AVB and bradycardia to 30's-40's. PTA metoprolol stopped 11/17 (multiple doses had been held prior due to HR parameters). Seen by EP and felt pt may have had some symptoms  and PPM recommended but not immediately given recent surgery.  - Continue telemetry.  - Metoprolol stopped.  - Plan discharge on 30d cardiac monitor.  - Follow-up with EP outpatient.  - Appreciate help from Dr. Vasquez.    Status post mechanical aortic and mitral valve replacements on anticoagulation.  * Originally had AVR (along with CABG) 2006. Underwent redo sternotomy with mechanical AVR and MVR 2013. Historically on chronic anticoagulation with warfarin with a goal INR of 2.5 to 3.5. Had been off warfarin since Friday, 11/08/2019 in anticipation of needing bridging for surgery.   * INR 1.65 on admit 11/10. Started on bridging heparin gtt post surgery on 11/12. Restarted warfarin 11/13.    Recent Labs   Lab 11/19/19  0622 11/18/19  0817 11/17/19  0750 11/16/19  0750 11/15/19  0806 11/14/19  0753   INR 2.42* 1.95* 1.66* 1.35* 1.23* 1.21*   - Continue warfarin.  - Discontinue heparin gtt as discharging to home.    Coronary artery disease.  Hypertension (benign essential).  [PTA: furosemide 20 mg daily; metoprolol 25 mg BID.]  S/p CABG (along with AVR) in 2006. Nuc stress 4/2019 showed no ischemia, LVEF 59%.  - Continue metoprolol, rosuvastatin.  - Resume furosemide at discharge.  - Monitor i/o's, daily wts.     Anemia, suspect chronic component.  Hgb 11.2 on admit 11/10. No overt clinical signs of major bleeding.  Recent Labs   Lab 11/19/19  0622 11/16/19  0750 11/15/19  0806 11/13/19  0804   HGB 10.4* 10.9* 10.6* 10.3*   - Monitor CBC periodically.    PAD.  H/o AAA stent repair.     Obesity, BMI of 32.22.  Obstructive sleep apnea.  - Pursue lifestyle modification with diet and exercise when able.  - Continue CPAP.     Hyperlipidemia.  - Continue ezetimibe and rosuvastatin.     Benign prostatic hypertrophy.  - Continue tamsulosin.    Ulcerative colitis.  - Continue mesalamine.     Constipation.  - Continue scheduled Miralax.    Diet: Advance Diet as Tolerated: Regular Diet Adult  Snacks/Supplements Adult: Boost  Shake; Between Meals    Prophylaxis: PCD's, ambulation.   Lord Catheter: not present  Code Status: Full Code      Disposition Plan   Expected discharge: Today, recommended to prior living arrangement if OK with Plastic Surgery.  Entered: Jack Spann MD 11/19/2019, 7:34 AM         Interval History   No acute events overnight.  Doing OK overall.    -Data reviewed today: I reviewed all new labs and imaging over the last 24 hours. I personally reviewed no images or EKG's today.    Physical Exam   Heart Rate: 50, Blood pressure 126/50, pulse 52, temperature 98  F (36.7  C), temperature source Oral, resp. rate 17, weight 91.1 kg (200 lb 12.8 oz), SpO2 95 %.  Vitals:    11/10/19 1530 11/18/19 0839   Weight: 90.5 kg (199 lb 8.3 oz) 91.1 kg (200 lb 12.8 oz)     Vital Signs with Ranges  Temp:  [98  F (36.7  C)-98.2  F (36.8  C)] 98  F (36.7  C)  Pulse:  [49-52] 52  Heart Rate:  [49-50] 50  Resp:  [15-17] 17  BP: (116-152)/(50-63) 126/50  SpO2:  [95 %-98 %] 95 %  Patient Vitals for the past 24 hrs:   BP Temp Temp src Pulse Heart Rate Resp SpO2 Weight   11/18/19 2325 126/50 98  F (36.7  C) Oral 52 50 17 95 % --   11/18/19 1953 (!) 152/63 98  F (36.7  C) Oral (!) 49 (!) 49 16 97 % --   11/18/19 1552 116/51 98.2  F (36.8  C) Oral 50 (!) 49 16 98 % --   11/18/19 1530 -- -- -- -- -- 16 -- --   11/18/19 1434 -- -- -- -- -- 16 -- --   11/18/19 0845 -- -- -- -- -- 16 -- --   11/18/19 0839 -- -- -- -- -- -- -- 91.1 kg (200 lb 12.8 oz)   11/18/19 0803 -- -- -- -- -- 15 -- --     I/O's Last 24 hours  I/O last 3 completed shifts:  In: 2144 [P.O.:1900; I.V.:244]  Out: 943 [Urine:925; Drains:18]    Constitutional: Alert, oriented, pleasant.  Respiratory: Diminished in bases. No crackles or wheezes.  Cardiovascular: Slightly lashae, regular.  GI:   Skin/Integumen:   Other:        Data   Recent Labs   Lab 11/19/19  0622 11/18/19  0817 11/17/19  0750 11/16/19  0750 11/15/19  0806 11/14/19  0753 11/13/19  0804   WBC 8.9  --   --  10.9  12.0*  --  12.7*   HGB 10.4*  --   --  10.9* 10.6*  --  10.3*   MCV 89  --   --  90 90  --  88    424  --  371 335  --  337   INR 2.42* 1.95* 1.66* 1.35* 1.23* 1.21* 1.27*   NA  --   --   --   --  137  --  135   POTASSIUM  --   --   --   --  3.9  --  4.4   CHLORIDE  --   --   --   --  102  --  103   CO2  --   --   --   --  30  --  27   BUN  --   --   --   --  14  --  14   CR  --   --   --   --  0.85  --  0.79   ANIONGAP  --   --   --   --  5  --  5   AWILDA  --   --   --   --  8.6  --  8.8   GLC  --   --   --   --  139* 104* 125*   ALBUMIN  --   --   --   --   --   --  2.9*   PROTTOTAL  --   --   --   --   --   --  6.6*   BILITOTAL  --   --   --   --   --   --  0.3   ALKPHOS  --   --   --   --   --   --  57   ALT  --   --   --   --   --   --  18   AST  --   --   --   --   --   --  22     Recent Labs   Lab Test 11/15/19  0806 11/14/19  0753 11/13/19  0804 11/10/19  1509 07/24/19  0648  05/19/18  0142  10/31/15  0556 10/30/15  0808  10/23/13  1118  10/22/13  2051   * 104* 125* 93 127*   < >  --    < >  --   --    < >  --    < >  --    BGM  --   --   --   --   --   --  94  --  131* 111*  --  114*  --  119*    < > = values in this interval not displayed.         No results found for this or any previous visit (from the past 24 hour(s)).    Medications   All medications were reviewed.    HEParin Stopped (11/19/19 0530)     Warfarin Therapy Reminder         ezetimibe  10 mg Oral QPM     ferrous sulfate  325 mg Oral Daily with breakfast     mesalamine  800 mg Oral BID     mineral oil-hydrophilic petrolatum   Topical Daily     multivitamin w/minerals  1 tablet Oral Daily     polyethylene glycol  17 g Oral Daily     rosuvastatin  40 mg Oral QPM     sodium chloride (PF)  3 mL Intracatheter Q8H     tamsulosin  0.4 mg Oral QPM

## 2019-11-19 NOTE — TELEPHONE ENCOUNTER
Noted, thank you.  Last INR : 2.42; last coumadin dosin/13 = 5 mg   = 7.5 mg  11/15 = 10 mg daily there after     Aline Beal RN  Anticoagulation Nurse - [facility] [place]

## 2019-11-20 ENCOUNTER — TELEPHONE (OUTPATIENT)
Dept: PEDIATRICS | Facility: CLINIC | Age: 79
End: 2019-11-20

## 2019-11-20 ENCOUNTER — TELEPHONE (OUTPATIENT)
Dept: CARDIOLOGY | Facility: CLINIC | Age: 79
End: 2019-11-20

## 2019-11-20 LAB — INTERPRETATION ECG - MUSE: NORMAL

## 2019-11-20 NOTE — PROGRESS NOTES
"Pre-Visit Planning     Future Appointments   Date Time Provider Department Center   11/25/2019  9:30 AM [facility]   11/26/2019 10:30 AM Reba Escobar APRN CNP [facility]   12/26/2019  1:10 PM Anabell Xiao APRN CNP [facility]   1/10/2020 10:30 AM Isrrael Dobbins [place] [facility] [place] [facility]   2/25/2020  9:05 AM Jodi Flower Mai, MD [facility]     Arrival Time for this Appointment: 10:10 AM   Appointment Notes for this encounter:   Post hosptial - DOD 11/19/2019 [facility]     Questionnaires Reviewed/Assigned  No additional questionnaires are needed      Patient preferred phone number: [phone]    Spoke to patient via phone. Patient does not have additional questions or concerns.        Visit is not preventive.    Health Maintenance Due   Topic Date Due     URINE DRUG SCREEN  1940     ANNUAL REVIEW OF HM ORDERS  1940     ZOSTER IMMUNIZATION (2 of 3) 02/17/2009     HF ACTION PLAN  07/18/2019     MyChart  Patient is active on [employer].    Questionnaire Review   Advised patient to arrive early in order to complete questionnaires.    Call Summary  \"Thank you for your time today.  If anything comes up before your appointment, please feel free to contact us at [phone].\"  Estefania ASH RN, BSN    "

## 2019-11-20 NOTE — TELEPHONE ENCOUNTER
Patient was evaluated by cardiology while inpatient for presyncopal episode. PMH of bifascicular block with first degree HB. Pt had episodes of 2nd degree HB during admission, with HR's down into the 30-40's bpm range with lightheadedness even after Metoprolol held. Seen by Dr. Vasquez and discharged wearing 30 day cardiac event monitor. Probable progression of AV conduction disease and may need PPM. Writer attempted to call patient to discuss any post hospital d/c questions he may have, review medication changes, and confirm f/u appts, but no answer. VM left instructing pt to call back with any extreme lightheadedness, presyncope or syncopal episodes (or call 911). Reminded to not be taking Metoprolol. RN left reminder with patient that he has an apt scheduled on 12/26/19 with GRETTA Anabell Xiao. Writer's and after hours cardiology phone numbers provided. Patient advised to call clinic with any cardiac related questions or concerns prior to this gretta't. Patient verbalized understanding and agreed with plan. ARABELLA Gibbs RN.

## 2019-11-20 NOTE — TELEPHONE ENCOUNTER
Reason for call:  Home Healthcare Reason for Call:  Home Health Care    Antonette with [facility] called regarding (reason for call): req verbal orders    Orders are needed for this patient. Skilled Nursing    Skilled Nursin times a week for 2 weeks and 3 PRNs      Phone Number Homecare Nurse can be reached at: [phone]     Can we leave a detailed message on this number? YES      Best Time: any     Call taken on 2019 at 12:31 PM by Constanza Spann

## 2019-11-21 ENCOUNTER — PATIENT OUTREACH (OUTPATIENT)
Dept: CARE COORDINATION | Facility: CLINIC | Age: 79
End: 2019-11-21

## 2019-11-21 DIAGNOSIS — S81.001A OPEN WOUND OF RIGHT KNEE, INITIAL ENCOUNTER: Primary | ICD-10-CM

## 2019-11-21 NOTE — PROGRESS NOTES
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     Home Care Contact:              Home Care Agency: [facility] RN               Contact name () and phone number: JUSTIN Huizar [phone]              Care Coordination contacted home care: Yes              Anticipated start of care date: 19    Patient Contact:               Introduced self and role of care coordination.               Discharge instructions were reviewed with patient/caregiver.               Patient questions/concerns: YES.  Patient states he was to have another home care visit today to have dressing changed.  Patient states he has not heard from anyone from home care.  RN CC reviewed chart, do not note any scheduled visits for patient today.  RN CC called home care , Gaye.  She states that the admission RN has not entered any orders, therefore no visits have been scheduled.   RN CC spoke with Aline home care Liaison for assistance.  She was able to track down admission RN  Who explained she was waiting on clinic for orders.  Rn CC reviewed chart and it is noted telephone encounter dated  with request for orders and multiple follow up calls requesting orders with no response.   RN CC provided verbal orders for following:  Orders are needed for this patient. Skilled Nursing  Skilled Nursin19, 19, then 2x weekly for 1 week and 3 PRNs    Patient will be able to have nursing visit today still.  Patient updated and very happy with outcome.    RN CC will route to PCP and SB3 PAL.    Patient has no other needs at this  time.    Plan: RN/SW Care Coordinator will await notification from home care staff informing RN/SW Care Coordinator of patients discharge plans/needs.     Nemo Crow RN  Care Coordination  Phone:  [phone]  Email: [email or web address]  [facility]

## 2019-11-21 NOTE — TELEPHONE ENCOUNTER
Antonette from  Home Care called to expedite this request. She states the pt is prospectively to have an appt today (11/21) and needs the verbal consent to schedule as soon as possible. Please contact Antonette for approval.    Rosa Meyer on 11/21/2019 at 8:31 AM

## 2019-11-21 NOTE — TELEPHONE ENCOUNTER
I approve of requested home care orders.  Please call to approve verbal orders.    Chris Flower MD

## 2019-11-21 NOTE — TELEPHONE ENCOUNTER
Called Antonette from  home care, left a detailed message on her confidential voicemail giving approval for the orders.     Instructed Antonette to call back with any questions or concerns.

## 2019-11-21 NOTE — TELEPHONE ENCOUNTER
Antonette from  home care is requesting order for Skilled Nursing, 2x per week for 2 weeks, with 3 prn's.     Routing to Dr. Flower to advise.

## 2019-11-21 NOTE — TELEPHONE ENCOUNTER
Antonette from Home Care waiting to hear back as patient needs a nurse out for wound care this week.  Please call Antonette back at [phone]

## 2019-11-22 ENCOUNTER — TELEPHONE (OUTPATIENT)
Dept: PEDIATRICS | Facility: CLINIC | Age: 79
End: 2019-11-22

## 2019-11-22 DIAGNOSIS — Z79.01 LONG TERM CURRENT USE OF ANTICOAGULANT THERAPY: ICD-10-CM

## 2019-11-22 DIAGNOSIS — I48.91 AF (ATRIAL FIBRILLATION) (H): ICD-10-CM

## 2019-11-22 DIAGNOSIS — Z95.2 S/P MITRAL VALVE REPLACEMENT: ICD-10-CM

## 2019-11-22 LAB — INR PPP: 4.7

## 2019-11-22 NOTE — TELEPHONE ENCOUNTER
ANTICOAGULATION MANAGEMENT     Patient Name:  Fausto Farr  Date:  2019    ASSESSMENT /SUBJECTIVE:    Today's INR result of 4.7 is supratherapeutic. Goal INR of 2.5-3.5      Warfarin dose taken: Warfarin taken as previously instructed    Diet: No new diet changes affecting INR    Medication changes/ interactions: Norco for pain relief    Previous INR: Subtherapeutic     S/S of bleeding or thromboembolism: No    New injury or illness:  Yes: surgery Right gastronemius flap to R Knee    Upcoming surgery, procedure or cardioversion:  No    Additional findings: Patient has RON in place at Surgical site      PLAN:    Spoke with JUSTIN Posada regarding INR result and instructed:     Warfarin Dosing Instructions: Hold warfarin today then take 5mg tomorrow then continue your current warfarin dose of 5mg MWF and 7.5mg .blessing    Instructed patient to follow up no later than:     Education provided: Yes: JUSTIN Posada denies signs and symptoms of bleeding;  was educated regarding what signs and symptoms to be aware of and when seek immediate medical attention at ED versus PCP's Clinic.         JUSTIN Posada verbalizes understanding and agrees to warfarin dosing plan.    Instructed to call the Anticoagulation Clinic for any changes, questions or concerns. (#[phone])        OBJECTIVE:  INR   Date Value Ref Range Status   2019 4.7  Final             Anticoagulation Summary  As of 2019    INR goal:   2.5-3.5   TTR:   71.5 % (11.4 mo)   INR used for dosin.7! (2019)   Warfarin maintenance plan:   5 mg (5 mg x 1) every Mon, Wed, Fri; 7.5 mg (5 mg x 1.5) all other days   Full warfarin instructions:   5 mg every Mon, Wed, Fri; 7.5 mg all other days   Weekly warfarin total:   45 mg   Plan last modified:   Caryn Campos RN (2019)   Next INR check:      Priority:   High   Target end date:   Indefinite    Indications    AF (atrial fibrillation) (H) [I48.91]  S/P mitral valve replacement  [Z95.2]  Long term current use of anticoagulant therapy [Z79.01]             Anticoagulation Episode Summary     INR check location:       Preferred lab:       Send INR reminders to:   SHANNA DOWELL    Comments:   5mg tabs - andrade dose // transfer from [facility] // [facility] // CALENDAR // right knee replaced 7/22/19      Anticoagulation Care Providers     Provider Role Specialty Phone number    Jodi Flower Mai, MD  Internal Medicine [phone]

## 2019-11-25 ENCOUNTER — DOCUMENTATION ONLY (OUTPATIENT)
Dept: CARDIOLOGY | Facility: CLINIC | Age: 79
End: 2019-11-25

## 2019-11-25 NOTE — TELEPHONE ENCOUNTER
"Patient was seen by Dr. Vasquez while in patient on 11/18/19.    Per Dr. Vasquez, \"1.  Second-degree atrioventricular block, likely Mobitz II.  Mr. Farr has known chronic AV conduction disease with bifascicular block and first-degree AV conduction delay.  It appears that AV conduction disease has progressed to the point that he now has second-degree AV block with sudden bradycardia in the high 30s and 40s during this admission.  He had been on metoprolol tartrate 25 mg b.i.d. prior to admission, but this has been held for the past 48 hours.      Given his bradycardia last night (approximately 48 hours after the last dose of metoprolol), I strongly suspect the patient will require a permanent pacemaker as he appears to be symptomatic with intermittent lightheadedness.  However, because of his recent surgery and open leg wound, I would wait to plan the procedure until he is fully healed.      At the moment the patient is awaiting for his INR to become therapeutic (he is on IV heparin), as he has 2 mechanical heart valves.\"    Event monitor was placed 11/20/19. Looking for bradycardia. Presenting rhythm shows ST with IVCD, PACs, and PVCs. We also received an \"urgent report\" which shows afib with IVCD and PVCs. Duration of episode is not available. He is scheduled for follow up with Anabell 12/26/19. He is on warfarin. Folder started for patient.     "

## 2019-11-25 NOTE — PROGRESS NOTES
Subjective     Fausto Farr is a 79 year old male who presents to clinic today for the following health issues:    [facility]       Hospital Follow-up Visit:    Hospital/Nursing Home/IP Rehab Facility: [facility]  Date of Admission: 11/10/19  Date of Discharge: 11/19/19  Reason(s) for Admission: Underwent total knee arthroplasty July 2019 with delayed wound healing with visible tendon. Hx mechanical aVR and MVR on warfarin, admitted for bridging prior to surgery. Underwent right gastrocnemius muscle transfer to right knee and split thickness skin graft from right thigh to knee    Hx atrial fibrillation s/p ablation 4/2019. Bradycardia post-surgery with intermittent 2nd degree AVB. EP recommend pacemaker. Metoprolol stopped during hospitalization. Discharged with cardiac monitor and f/u with EP as outpatient            Problems taking medications regularly:  None       Medication changes since discharge: metoprolol discontinued       Problems adhering to non-medication therapy:  None    Summary of hospitalization:  [facility] discharge summary reviewed  Diagnostic Tests/Treatments reviewed.  Follow up needed: cardiology f/u 12/26, he saw plastic surgeon yesterday, dressing changed and changed from home daily. Will f/u with surgeon in 1 week. Ok to start showering now and allowing wound to get wet in shower  Other Healthcare Providers Involved in Patient s Care:         Homecare  Update since discharge: improved.     He is decreasing use of pain medication. Now taking 1 tablet Norco and 1 tablet Robaxin TID. He states he asked for pain medication yesterday and forgot to pick it up. Bowels are regular. Not taking bowel medication    Post Discharge Medication Reconciliation: discharge medications reconciled, continue medications without change.  Plan of care communicated with patient     Coding guidelines for this visit:  Type of Medical   Decision Making Face-to-Face Visit       within 7 Days of  discharge Face-to-Face Visit        within 14 days of discharge   Moderate Complexity [id number]   High Complexity 30067 [id number]                Patient Active Problem List   Diagnosis     Rotator cuff (capsule) sprain     Somatic dysfunction of pelvic region     Contact dermatitis and other eczema, due to unspecified cause     Ulcerative colitis (H)     Atrial fibrillation (H)     Other specified anemias     Gastric ulcer     Bilateral low back pain with left-sided sciatica     Nonallopathic lesion of lumbar region     Nonallopathic lesion of sacral region     Diaphragmatic hernia     Abdominal pain, epigastric     Malaise and fatigue     Nocturia     Nonallopathic lesion of cervical region     Nonallopathic lesion of thoracic region     Malignant neoplasm of prostate (H)     Abdominal aortic aneurysm (H)     Nonallopathic lesion of rib cage     Vitamin D deficiency disease     Anemia relative at 12.5 in 8-13      Essential hypertension, benign     Hyperlipidemia LDL goal <100     Prostate cancer (H)     Glucose intolerance (impaired glucose tolerance)     Sciatica of left side since 2000     Valvular heart disease     Heart murmur     MRSA (methicillin resistant Staphylococcus aureus)     Health Care Home     Urinary retention     Coronary artery disease     ACP (advance care planning)     AF (atrial fibrillation) (H)     S/P aortic valve replacement     S/P CABG (coronary artery bypass graft)     Stented coronary artery     Mixed hyperlipidemia     PVD (peripheral vascular disease) (H)     Abnormal ECG     Personal history of tobacco use, presenting hazards to health     Spinal stenosis of lumbar region without neurogenic claudication     S/P mitral valve replacement     RBBB with left anterior fascicular block     S/P lumbar spinal fusion     Long term current use of anticoagulant therapy     Chronic systolic congestive heart failure (H)     GAGE (obstructive sleep apnea)     Sepsis due to group B Streptococcus (H)      Typical atrial flutter (H)     Obesity (BMI 35.0-39.9) with comorbidity (H)     Knee pain, chronic     Total knee replacement status     Open wound of right knee     Past Surgical History:   Procedure Laterality Date     AORTIC VALVE REPLACEMENT  1/3/06    redo AVR SJM 21mm and SJM MVR 27mm in 2013SJM 21([id number]):AVR, SJM 27 :MVR-     APPLY WOUND VAC N/A 11/12/2019    Procedure: APPLICATION, WOUND VAC;  Surgeon: Sara Martinez MD;  Location:  OR     ARTHROPLASTY KNEE      right knee     ARTHROPLASTY KNEE Right 7/22/2019    Procedure: Right total knee arthroplasty;  Surgeon: Prasanth Jensen MD;  Location: [facility] OR     BACK SURGERY  Oct 2015    Fusion L4-5, laminectomy L2, L3     BYPASS GRAFT ARTERY CORONARY  10/2013    reimplantation of radial artery graft to RCA     C CABG, VEIN, TWO  1/3/06    Left radial to RCA, LIMA to LAD (RA to RCA reimplanted at time of 2013 surg)     CARDIAC CATHERIZATION  11/2005    Stent placed to RCA     CARDIAC CATHERIZATION  04/2013    Occluded RCA, patent LIMA to LAD and radial graft to PDA     CARPAL TUNNEL RELEASE RT/LT  1994     COLONOSCOPY  8-22-11     CYSTOSCOPY FLEXIBLE  10/16/2013    Procedure: CYSTOSCOPY FLEXIBLE;  FLEXIBLE CYSTOSCOPY / DILATION OF URETHRA / INSERTION OF LESLIE;  Surgeon: Cooper Wallace MD;  Location:  OR     ENDOVASCULAR REPAIR, INFRARENAL ABDOMINAL AORTIC ANEURYSM/DISSECTION; MODULAR BIFURCATED PROSTHESIS  2006    AAA repair endovascular     ENT SURGERY       EP ABLATION ATRIAL FLUTTER N/A 4/22/2019    Procedure: EP Ablation Atrial Flutter;  Surgeon: Jessy Vasquez MD;  Location:  HEART CARDIAC CATH LAB     GENITOURINARY SURGERY  6/16/08    radioactive seeding     GRAFT FLAP PEDICLE EXTREMITY (LOCATION) Right 11/12/2019    Procedure: SURGICAL PROCUREMENT, FLAP, PEDICLE, EXTREMITY;  Surgeon: Sara Martinez MD;  Location:  OR     GRAFT SKIN SPLIT THICKNESS FROM EXTREMITY Right 11/12/2019    Procedure: RIGHT  GASTRONEMIUS FLAP TO RIGHT KNEE, SPLIT THICKNESS SKIN GRAFT FROM RIGHT THIGH TO RIGHT KNEE, SURGICAL PROCUREMENT, FLAP, PEDICLE, EXTREMITY, WOUND VAC PLACEMENT;  Surgeon: Sara Martinez MD;  Location:  OR     HEAD & NECK SURGERY      vocal cord polypectomy     INCISION AND DRAINAGE KNEE, COMBINED Right 2019    Procedure: INCISION AND DRAINAGE, KNEE W/ Secondary Wound Closure;  Surgeon: Prasanth Jensen MD;  Location: [facility] OR     KNEE SURGERY   Right knee arthroscopy     OPTICAL TRACKING SYSTEM FUSION SPINE POSTERIOR LUMBAR THREE+ LEVELS N/A 10/29/2015    Procedure: OPTICAL TRACKING SYSTEM FUSION SPINE POSTERIOR LUMBAR THREE+ LEVELS;  Surgeon: Walt Garcia MD;  Location:  OR     PROSTATE SURGERY      radioactive seeding 08     REPAIR ANEURYSM ABDOMINAL AORTA       REPAIR VALVE MITRAL  10/16/2013    SJM 21([id number]):AVR, SJM 27 :MVRProcedure: REPAIR VALVE MITRAL;  REDO STERNOTOMY/REDO AORTIC VALVE REPLACEMENT/ MITRAL VALVE REPLACEMENT/REIMPLANTATION OF RIGHT CORONARY ARTERY BYPASS WITH RACHAEL ( ON PUMP);  Surgeon: Viet Singh MD;  Location:  OR     REPLACE VALVE AORTIC  10/16/2013    Procedure: REPLACE VALVE AORTIC;;  Surgeon: Viet Singh MD;  Location:  OR     SURGERY GENERAL IP CONSULT  2008 Excision aneurysm abdominal aorta     SURGERY GENERAL IP CONSULT   Vocal cord polypectomy     VASCULAR SURGERY  1993     varicose vein stripping       Social History     Tobacco Use     Smoking status: Former Smoker     Packs/day: 1.00     Years: 40.00     Pack years: 40.00     Start date: 4/15/1962     Last attempt to quit: 10/23/2002     Years since quittin.1     Smokeless tobacco: Never Used   Substance Use Topics     Alcohol use: Yes     Frequency: 2-4 times a month     Drinks per session: 1 or 2     Comment: a couple beers per week (socially)     Family History   Problem Relation Age of Onset     No Known Problems Mother      Coronary  Artery Disease Father         CABG     Heart Disease Father         Pacemaker     No Known Problems Brother      No Known Problems Sister      No Known Problems Son      Other Cancer Daughter      No Known Problems Daughter      Heart Disease Brother      Other - See Comments Grandchild          Current Outpatient Medications   Medication Sig Dispense Refill     betamethasone valerate (VALISONE) 0.1 % external lotion Apply topically 2 times daily Apply topically to scalp twice daily PRN 60 mL 3     ezetimibe (ZETIA) 10 MG tablet Take 1 tablet (10 mg) by mouth daily 90 tablet 3     ferrous sulfate (FEROSUL) 325 (65 Fe) MG tablet Take 325 mg by mouth daily (with breakfast)       fluocinonide (LIDEX) 0.05 % external ointment Apply topically 2 times daily as needed       furosemide (LASIX) 40 MG tablet Take 0.5 tablets (20 mg) by mouth daily 45 tablet 3     glucosamine-chondroitin 500-400 MG CAPS per capsule Take 1 capsule by mouth 2 times daily       HYDROcodone-acetaminophen (NORCO) 5-325 MG tablet Take 1-2 tablets by mouth every 6 hours as needed for severe pain 30 tablet 0     mesalamine (ASACOL HD) 800 MG EC tablet Take 1 tablet (800 mg) by mouth 2 times daily 180 tablet 11     methocarbamol (ROBAXIN) 750 MG tablet Take 1 tablet (750 mg) by mouth every 6 hours as needed for muscle spasms 30 tablet 0     polyethylene glycol (MIRALAX/GLYCOLAX) packet Take 17 g by mouth daily as needed for constipation 30 packet 0     rosuvastatin (CRESTOR) 40 MG tablet Take 1 tablet (40 mg) by mouth daily 90 tablet 3     senna-docusate (SENOKOT-S/PERICOLACE) 8.6-50 MG tablet Take 1-2 tablets by mouth 2 times daily as needed for constipation 60 tablet 0     tamsulosin (FLOMAX) 0.4 MG capsule TAKE 1 CAPSULE BY MOUTH EVERY DAY 90 capsule 3     warfarin ANTICOAGULANT (COUMADIN) 5 MG tablet Take 5 mg (1 tablet) every Mon, Wed, Fri; 7.5 mg (1.5 tablets) all other days or as directed by INR Clinic 120 tablet 3     acetaminophen (TYLENOL)  "325 MG tablet Take 1-2 tablets (325-650 mg) by mouth every 6 hours as needed for mild pain 250 tablet 11     cholecalciferol 25 MCG (1000 UT) TABS Take 1,000 Units by mouth daily       BP Readings from Last 3 Encounters:   11/26/19 110/66   11/19/19 125/54   10/28/19 112/67    Wt Readings from Last 3 Encounters:   11/26/19 89.4 kg (197 lb 3.2 oz)   11/18/19 91.1 kg (200 lb 12.8 oz)   10/28/19 90.7 kg (200 lb)                 Reviewed and updated as needed this visit by Provider         Review of Systems   ROS COMP: Constitutional, HEENT, cardiovascular, pulmonary, gi and gu systems are negative, except as otherwise noted.      Objective    /66 (BP Location: Right arm, Patient Position: Chair, Cuff Size: Adult Regular)   Pulse 79   Temp 97.6  F (36.4  C) (Oral)   Resp 16   Ht 1.651 m (5' 5\")   Wt 89.4 kg (197 lb 3.2 oz)   SpO2 97%   BMI 32.82 kg/m    Body mass index is 32.82 kg/m .  Physical Exam   GENERAL: healthy, alert and no distress  RESP: lungs clear to auscultation - no rales, rhonchi or wheezes  CV: regular rate and rhythm, normal S1 S2, no S3 or S4, no murmur, click or rub, no peripheral edema and peripheral pulses strong  ABDOMEN: soft, nontender, no hepatosplenomegaly, no masses and bowel sounds normal  MS: Gait slow, with use of walker, avoiding flexion right knee  SKIN: Skin graft site right upper thigh, 10x6 cm  Large open wound right anterior lower extremity, trace surrounding erythema without warmth, 3 sutures intact medially, some eschar around the edges  Large open wound right posterior lower extremity, 4 sutures intact laterally, some eschar around the edges     I applied vaseline to wound, applied petroleum gauze, kerlex and wrapped with ACE    Diagnostic Test Results:  Labs reviewed in Epic  Results for orders placed or performed in visit on 11/26/19 (from the past 24 hour(s))   CBC with platelets   Result Value Ref Range    WBC 12.8 (H) 4.0 - 11.0 10e9/L    RBC Count 4.11 (L) 4.4 - " "5.9 10e12/L    Hemoglobin 11.6 (L) 13.3 - 17.7 g/dL    Hematocrit 36.9 (L) 40.0 - 53.0 %    MCV 90 78 - 100 fl    MCH 28.2 26.5 - 33.0 pg    MCHC 31.4 (L) 31.5 - 36.5 g/dL    RDW 14.1 10.0 - 15.0 %    Platelet Count 524 (H) 150 - 450 10e9/L     *Note: Due to a large number of results and/or encounters for the requested time period, some results have not been displayed. A complete set of results can be found in Results Review.           Assessment & Plan       ICD-10-CM    1. Hospital discharge follow-up Z09    2. Open wound of right knee, initial encounter S81.001A CBC with platelets     HYDROcodone-acetaminophen (NORCO) 5-325 MG tablet     methocarbamol (ROBAXIN) 750 MG tablet   3. Long term current use of anticoagulant therapy Z79.01 INR     INR   4. S/P mitral valve replacement Z95.2 INR     INR   5. AV block, Mobitz 2 I44.1         BMI:   Estimated body mass index is 32.82 kg/m  as calculated from the following:    Height as of this encounter: 1.651 m (5' 5\").    Weight as of this encounter: 89.4 kg (197 lb 3.2 oz).     Unfortunately, our INR clinic could not fit him in today. INR checked in lab and will have INR nurse contact patient with further dosing instructions  Wound care done in clinic. No signs of infection  Will f/u with surgeon next week as planned  He is wearing cardiac monitor. F/U cardiology in 1 month  He is doing well post discharge. Appetite and bowels are normal. I did refill his pain medication though advised ongoing refills and pain management plan should come from surgeon  He has routine f/u with primary care provider scheduled in 3 months        Patient Instructions   I refilled your pain medication and muscle relaxant. Please discuss ongoing refills with your surgeon    You had your INR checked today. You should receive a call from our clinic later today regarding ongoing dosing of your warfarin        ADELE Beckford [facility] [place]        "

## 2019-11-26 ENCOUNTER — TELEPHONE (OUTPATIENT)
Dept: PEDIATRICS | Facility: CLINIC | Age: 79
End: 2019-11-26

## 2019-11-26 ENCOUNTER — OFFICE VISIT (OUTPATIENT)
Dept: PEDIATRICS | Facility: CLINIC | Age: 79
End: 2019-11-26
Payer: MEDICARE

## 2019-11-26 VITALS
SYSTOLIC BLOOD PRESSURE: 110 MMHG | BODY MASS INDEX: 32.86 KG/M2 | RESPIRATION RATE: 16 BRPM | HEIGHT: 65 IN | WEIGHT: 197.2 LBS | TEMPERATURE: 97.6 F | DIASTOLIC BLOOD PRESSURE: 66 MMHG | HEART RATE: 79 BPM | OXYGEN SATURATION: 97 %

## 2019-11-26 DIAGNOSIS — Z95.2 S/P MITRAL VALVE REPLACEMENT: ICD-10-CM

## 2019-11-26 DIAGNOSIS — I48.91 AF (ATRIAL FIBRILLATION) (H): ICD-10-CM

## 2019-11-26 DIAGNOSIS — I44.1 AV BLOCK, MOBITZ 2: ICD-10-CM

## 2019-11-26 DIAGNOSIS — Z79.01 LONG TERM CURRENT USE OF ANTICOAGULANT THERAPY: ICD-10-CM

## 2019-11-26 DIAGNOSIS — Z09 HOSPITAL DISCHARGE FOLLOW-UP: Primary | ICD-10-CM

## 2019-11-26 DIAGNOSIS — S81.001A OPEN WOUND OF RIGHT KNEE, INITIAL ENCOUNTER: ICD-10-CM

## 2019-11-26 LAB
ERYTHROCYTE [DISTWIDTH] IN BLOOD BY AUTOMATED COUNT: 14.1 % (ref 10–15)
HCT VFR BLD AUTO: 36.9 % (ref 40–53)
HGB BLD-MCNC: 11.6 G/DL (ref 13.3–17.7)
INR PPP: 2.03 (ref 0.86–1.14)
MCH RBC QN AUTO: 28.2 PG (ref 26.5–33)
MCHC RBC AUTO-ENTMCNC: 31.4 G/DL (ref 31.5–36.5)
MCV RBC AUTO: 90 FL (ref 78–100)
PLATELET # BLD AUTO: 524 10E9/L (ref 150–450)
RBC # BLD AUTO: 4.11 10E12/L (ref 4.4–5.9)
WBC # BLD AUTO: 12.8 10E9/L (ref 4–11)

## 2019-11-26 PROCEDURE — 36415 COLL VENOUS BLD VENIPUNCTURE: CPT | Performed by: NURSE PRACTITIONER

## 2019-11-26 PROCEDURE — 85610 PROTHROMBIN TIME: CPT | Performed by: NURSE PRACTITIONER

## 2019-11-26 PROCEDURE — 85027 COMPLETE CBC AUTOMATED: CPT | Performed by: NURSE PRACTITIONER

## 2019-11-26 PROCEDURE — 99214 OFFICE O/P EST MOD 30 MIN: CPT | Performed by: NURSE PRACTITIONER

## 2019-11-26 RX ORDER — METHOCARBAMOL 750 MG/1
750 TABLET, FILM COATED ORAL EVERY 6 HOURS PRN
Qty: 30 TABLET | Refills: 0 | Status: SHIPPED | OUTPATIENT
Start: 2019-11-26 | End: 2020-07-24

## 2019-11-26 RX ORDER — HYDROCODONE BITARTRATE AND ACETAMINOPHEN 5; 325 MG/1; MG/1
1-2 TABLET ORAL EVERY 6 HOURS PRN
Qty: 30 TABLET | Refills: 0 | Status: ON HOLD | OUTPATIENT
Start: 2019-11-26 | End: 2020-02-10

## 2019-11-26 SDOH — HEALTH STABILITY: MENTAL HEALTH: HOW MANY STANDARD DRINKS CONTAINING ALCOHOL DO YOU HAVE ON A TYPICAL DAY?: 1 OR 2

## 2019-11-26 SDOH — HEALTH STABILITY: MENTAL HEALTH: HOW OFTEN DO YOU HAVE A DRINK CONTAINING ALCOHOL?: 2-4 TIMES A MONTH

## 2019-11-26 ASSESSMENT — MIFFLIN-ST. JEOR: SCORE: 1541.37

## 2019-11-26 NOTE — TELEPHONE ENCOUNTER
ANTICOAGULATION MANAGEMENT     Patient Name:  Fausto Farr  Date:  11/26/2019    ASSESSMENT /SUBJECTIVE:      Today's INR result of 2.03 is subtherapeutic. Goal INR of 2.5-3.5      Warfarin dose taken: Warfarin taken as previously instructed    Diet: No new diet changes affecting INR    Medication changes/ interactions: No new medications/supplements affecting INR    Previous INR: Subtherapeutic     S/S of bleeding or thromboembolism: No    New injury or illness:  No    Upcoming surgery, procedure or cardioversion:  No    Additional findings: Patient had RON drain removed yesterday, he is using his Oxycodone and muscle relaxer every 6 hours       PLAN:    Spoke with Fausto regarding INR result and instructed:     Warfarin Dosing Instructions: 10mg today and 7.5mg Wed/Thurs (total of 42.5mg going into 11/29 recheck date)    Instructed patient to follow up no later than: 11/29/19    Education provided: Yes: discussed s/s of thromboembolism and pain and inflammation's affect on INR    Ralph verbalizes understanding and agrees to warfarin dosing plan.    Instructed to call the Anticoagulation Clinic for any changes, questions or concerns. (#[phone])        OBJECTIVE:  INR   Date Value Ref Range Status   11/26/2019 2.03 (H) 0.86 - 1.14 Final

## 2019-11-26 NOTE — PATIENT INSTRUCTIONS
I refilled your pain medication and muscle relaxant. Please discuss ongoing refills with your surgeon    You had your INR checked today. You should receive a call from our clinic later today regarding ongoing dosing of your warfarin

## 2019-11-26 NOTE — PROGRESS NOTES
"Subjective     Fausto Farr is a 79 year old male who presents to clinic today for the following health issues:    HPI       Hospital Follow-up Visit:    Hospital/Nursing Home/IP Rehab Facility: [facility]  Date of Admission: 11/10/19  Date of Discharge: 11/19/19  Reason(s) for Admission: knee surgery            Problems taking medications regularly:  {NONE DEFAULTED:281226::\"None\"}       Medication changes since discharge: {NONE DEFAULTED:629208::\"None\"}       Problems adhering to non-medication therapy:  {NONE DEFAULTED:593250::\"None\"}  {roomer to stop here, delete this reminder}  Summary of hospitalization:  {HOSPITAL DISCHARGE SUMMARY INFO:816866::\"[facility] discharge summary reviewed\"}  Diagnostic Tests/Treatments reviewed.  Follow up needed: {NONE DEFAULTED:566647::\"none\"}  Other Healthcare Providers Involved in Patient s Care:         {those currently involved after discharge:887286::\"None\"}  Update since discharge: {IMPROVED DEFAULT:651436::\"improved.\"} {include information from family, SNF, care coordination}    Post Discharge Medication Reconciliation: {ACO Med Rec (Provider):427731}.  Plan of care communicated with {Communicate Plan to:102439::\"patient\"}     Coding guidelines for this visit:  Type of Medical   Decision Making Face-to-Face Visit       within 7 Days of discharge Face-to-Face Visit        within 14 days of discharge   Moderate Complexity 50779 [id number]   High Complexity 18778 48312            {additonal problems for provider to add (Optional):340144}    {HIST REVIEW/ LINKS 2 (Optional):860181}    Reviewed and updated as needed this visit by Provider         Review of Systems   {ROS COMP (Optional):672418}      Objective    /66 (BP Location: Right arm, Patient Position: Chair, Cuff Size: Adult Regular)   Pulse 79   Temp 97.6  F (36.4  C) (Oral)   Resp 16   Ht 1.651 m (5' 5\")   Wt 89.4 kg (197 lb 3.2 oz)   SpO2 97%   BMI 32.82 kg/m    Body mass index is " "32.82 kg/m .  Physical Exam   {Exam List (Optional):208583}    {Diagnostic Test Results (Optional):446191::\"Diagnostic Test Results:\",\"Labs reviewed in Epic\"}        {PROVIDER CHARTING PREFERENCE:821835}    "

## 2019-11-27 NOTE — RESULT ENCOUNTER NOTE
Ralph,  Your white blood cell count is mildly elevated. This could be due to ongoing inflammation and recovery process from the surgery. Your wound seemed to be healing well and no signs of infection. I recommend rechecking your white blood cell count when you see your surgeon to make sure it is not increasing. You can share these labs when you see your surgeon next week.   Your hemoglobin is improving since surgery and appears to be in your normal range.   Reba Escobar, CNP

## 2019-11-29 ENCOUNTER — TELEPHONE (OUTPATIENT)
Dept: PEDIATRICS | Facility: CLINIC | Age: 79
End: 2019-11-29

## 2019-11-29 DIAGNOSIS — Z79.01 LONG TERM CURRENT USE OF ANTICOAGULANT THERAPY: ICD-10-CM

## 2019-11-29 DIAGNOSIS — Z95.2 S/P MITRAL VALVE REPLACEMENT: ICD-10-CM

## 2019-11-29 DIAGNOSIS — I48.91 AF (ATRIAL FIBRILLATION) (H): ICD-10-CM

## 2019-11-29 LAB — INR PPP: 2.3 (ref 0.8–1.1)

## 2019-11-29 NOTE — TELEPHONE ENCOUNTER
ANTICOAGULATION MANAGEMENT     Patient Name:  Fausto Farr  Date:  2019    ASSESSMENT /SUBJECTIVE:      Today's INR result of 2.3 is subtherapeutic. Goal INR of 2.5-3.5      Warfarin dose taken: Warfarin taken as previously instructed    Diet: No new diet changes affecting INR    Medication changes/ interactions: No new medications/supplements affecting INR    Previous INR: Subtherapeutic     S/S of bleeding or thromboembolism: No    New injury or illness:  No    Upcoming surgery, procedure or cardioversion:  No    Additional findings: none      PLAN:    Spoke with Yessica home care nurse regarding INR result and instructed:     Warfarin Dosing Instructions: Take 7.5 mg today only then continue your current warfarin dose of 5 mg every Mon, Wed, Fri; 7.5 mg all other days    Instructed patient to follow up no later than: Tue 12/3    Education provided: Yes: significance of INR result      Yessica verbalizes understanding and agrees to warfarin dosing plan.    Instructed to call the Anticoagulation Clinic for any changes, questions or concerns. (#[phone])        OBJECTIVE:  INR   Date Value Ref Range Status   2019 2.3 (A) 0.8 - 1.1 Final             Anticoagulation Summary  As of 2019    INR goal:   2.5-3.5   TTR:   71.9 % (11.4 mo)   INR used for dosin.3! (2019)   Warfarin maintenance plan:   5 mg (5 mg x 1) every Mon, Wed, Fri; 7.5 mg (5 mg x 1.5) all other days   Full warfarin instructions:   : 7.5 mg; Otherwise 5 mg every Mon, Wed, Fri; 7.5 mg all other days   Weekly warfarin total:   45 mg   Plan last modified:   Diane Banda RN (2019)   Next INR check:   12/3/2019   Priority:   High   Target end date:   Indefinite    Indications    AF (atrial fibrillation) (H) [I48.91]  S/P mitral valve replacement [Z95.2]  Long term current use of anticoagulant therapy [Z79.01]             Anticoagulation Episode Summary     INR check location:       Preferred lab:        Send INR reminders to:   SHANNA DOWELL    Comments:   5mg tabs - andrade dose // transfer from [facility] // [facility] // CALENDAR // right knee replaced 7/22/19      Anticoagulation Care Providers     Provider Role Specialty Phone number    Jodi Flower Mai, MD  Internal Medicine [phone]

## 2019-12-01 ENCOUNTER — TRANSFERRED RECORDS (OUTPATIENT)
Dept: HEALTH INFORMATION MANAGEMENT | Facility: CLINIC | Age: 79
End: 2019-12-01

## 2019-12-03 ENCOUNTER — TELEPHONE (OUTPATIENT)
Dept: PEDIATRICS | Facility: CLINIC | Age: 79
End: 2019-12-03

## 2019-12-03 DIAGNOSIS — I48.91 AF (ATRIAL FIBRILLATION) (H): ICD-10-CM

## 2019-12-03 DIAGNOSIS — Z79.01 LONG TERM CURRENT USE OF ANTICOAGULANT THERAPY: ICD-10-CM

## 2019-12-03 DIAGNOSIS — Z95.2 S/P MITRAL VALVE REPLACEMENT: ICD-10-CM

## 2019-12-03 LAB — INR PPP: 3.6 (ref 0.8–1.1)

## 2019-12-03 NOTE — TELEPHONE ENCOUNTER
ANTICOAGULATION MANAGEMENT     Patient Name:  Fausto Farr  Date:  12/3/2019    ASSESSMENT /SUBJECTIVE:      Today's INR result of 3.6 is supratherapeutic. Goal INR of 2.5-3.5      Warfarin dose taken: Warfarin taken as previously instructed    Diet: No new diet changes affecting INR    Medication changes/ interactions: No new medications/supplements affecting INR    Previous INR: Subtherapeutic     S/S of bleeding or thromboembolism: No    New injury or illness:  No    Upcoming surgery, procedure or cardioversion:  No    Additional findings: None      PLAN:    Spoke with Vera [facility] nurse regarding INR result and instructed:     Warfarin Dosing Instructions: Continue your current warfarin dose    Instructed patient to follow up no later than: 1 week    Education provided: No      Vera verbalizes understanding and agrees to warfarin dosing plan.    Instructed to call the Anticoagulation Clinic for any changes, questions or concerns. (#[phone])        OBJECTIVE:  INR   Date Value Ref Range Status   12/03/2019 3.6 (A) 0.8 - 1.1 Final             Anticoagulation Summary  As of 12/3/2019    INR goal:   2.5-3.5   TTR:   72.8 % (11.4 mo)   INR used for dosing:   3.6! (12/3/2019)   Warfarin maintenance plan:   5 mg (5 mg x 1) every Mon, Wed, Fri; 7.5 mg (5 mg x 1.5) all other days   Full warfarin instructions:   5 mg every Mon, Wed, Fri; 7.5 mg all other days   Weekly warfarin total:   45 mg   Plan last modified:   Diane Banda RN (11/26/2019)   Next INR check:   12/10/2019   Priority:   High   Target end date:   Indefinite    Indications    AF (atrial fibrillation) (H) [I48.91]  S/P mitral valve replacement [Z95.2]  Long term current use of anticoagulant therapy [Z79.01]             Anticoagulation Episode Summary     INR check location:       Preferred lab:       Send INR reminders to:   SHANNA DOWELL    Comments:   5mg tabs - andrade dose // transfer from [facility] // [employer] // CALENDAR //  right knee replaced 7/22/19      Anticoagulation Care Providers     Provider Role Specialty Phone number    Jodi Flower Mai, MD  Internal Medicine [phone]

## 2019-12-07 ENCOUNTER — HOSPITAL ENCOUNTER (INPATIENT)
Facility: CLINIC | Age: 79
LOS: 4 days | Discharge: HOME HEALTH | DRG: 920 | End: 2019-12-11
Attending: EMERGENCY MEDICINE | Admitting: PLASTIC SURGERY
Payer: MEDICARE

## 2019-12-07 DIAGNOSIS — T81.89XD POSTPROCEDURAL NON-HEALING WOUND, SUBSEQUENT ENCOUNTER: ICD-10-CM

## 2019-12-07 DIAGNOSIS — S81.001D OPEN WOUND OF RIGHT KNEE, SUBSEQUENT ENCOUNTER: Primary | ICD-10-CM

## 2019-12-07 LAB
ANION GAP SERPL CALCULATED.3IONS-SCNC: 3 MMOL/L (ref 3–14)
BASOPHILS # BLD AUTO: 0 10E9/L (ref 0–0.2)
BASOPHILS NFR BLD AUTO: 0.3 %
BUN SERPL-MCNC: 21 MG/DL (ref 7–30)
CALCIUM SERPL-MCNC: 8.8 MG/DL (ref 8.5–10.1)
CHLORIDE SERPL-SCNC: 103 MMOL/L (ref 94–109)
CO2 SERPL-SCNC: 30 MMOL/L (ref 20–32)
CREAT SERPL-MCNC: 0.96 MG/DL (ref 0.66–1.25)
DIFFERENTIAL METHOD BLD: ABNORMAL
EOSINOPHIL # BLD AUTO: 0.2 10E9/L (ref 0–0.7)
EOSINOPHIL NFR BLD AUTO: 1.6 %
ERYTHROCYTE [DISTWIDTH] IN BLOOD BY AUTOMATED COUNT: 13.8 % (ref 10–15)
ERYTHROCYTE [DISTWIDTH] IN BLOOD BY AUTOMATED COUNT: 13.9 % (ref 10–15)
GFR SERPL CREATININE-BSD FRML MDRD: 75 ML/MIN/{1.73_M2}
GLUCOSE SERPL-MCNC: 92 MG/DL (ref 70–99)
HCT VFR BLD AUTO: 30.1 % (ref 40–53)
HCT VFR BLD AUTO: 34.4 % (ref 40–53)
HGB BLD-MCNC: 10.8 G/DL (ref 13.3–17.7)
HGB BLD-MCNC: 9.7 G/DL (ref 13.3–17.7)
IMM GRANULOCYTES # BLD: 0 10E9/L (ref 0–0.4)
IMM GRANULOCYTES NFR BLD: 0.3 %
INR PPP: 2.22 (ref 0.86–1.14)
LYMPHOCYTES # BLD AUTO: 1.7 10E9/L (ref 0.8–5.3)
LYMPHOCYTES NFR BLD AUTO: 14.4 %
MCH RBC QN AUTO: 28 PG (ref 26.5–33)
MCH RBC QN AUTO: 28.8 PG (ref 26.5–33)
MCHC RBC AUTO-ENTMCNC: 31.4 G/DL (ref 31.5–36.5)
MCHC RBC AUTO-ENTMCNC: 32.2 G/DL (ref 31.5–36.5)
MCV RBC AUTO: 89 FL (ref 78–100)
MCV RBC AUTO: 89 FL (ref 78–100)
MONOCYTES # BLD AUTO: 1.1 10E9/L (ref 0–1.3)
MONOCYTES NFR BLD AUTO: 9.4 %
NEUTROPHILS # BLD AUTO: 8.6 10E9/L (ref 1.6–8.3)
NEUTROPHILS NFR BLD AUTO: 74 %
NRBC # BLD AUTO: 0 10*3/UL
NRBC BLD AUTO-RTO: 0 /100
PLATELET # BLD AUTO: 341 10E9/L (ref 150–450)
PLATELET # BLD AUTO: 396 10E9/L (ref 150–450)
POTASSIUM SERPL-SCNC: 3.6 MMOL/L (ref 3.4–5.3)
RBC # BLD AUTO: 3.37 10E12/L (ref 4.4–5.9)
RBC # BLD AUTO: 3.86 10E12/L (ref 4.4–5.9)
SODIUM SERPL-SCNC: 136 MMOL/L (ref 133–144)
WBC # BLD AUTO: 10.9 10E9/L (ref 4–11)
WBC # BLD AUTO: 11.6 10E9/L (ref 4–11)

## 2019-12-07 PROCEDURE — 99207 ZZC CONSULT E&M CHANGED TO INITIAL LEVEL: CPT | Performed by: HOSPITALIST

## 2019-12-07 PROCEDURE — 12000000 ZZH R&B MED SURG/OB

## 2019-12-07 PROCEDURE — 25000128 H RX IP 250 OP 636: Performed by: PLASTIC SURGERY

## 2019-12-07 PROCEDURE — 25000125 ZZHC RX 250: Performed by: PLASTIC SURGERY

## 2019-12-07 PROCEDURE — 25000128 H RX IP 250 OP 636: Performed by: HOSPITALIST

## 2019-12-07 PROCEDURE — 85025 COMPLETE CBC W/AUTO DIFF WBC: CPT | Performed by: EMERGENCY MEDICINE

## 2019-12-07 PROCEDURE — 80048 BASIC METABOLIC PNL TOTAL CA: CPT | Performed by: EMERGENCY MEDICINE

## 2019-12-07 PROCEDURE — 99222 1ST HOSP IP/OBS MODERATE 55: CPT | Mod: AI | Performed by: HOSPITALIST

## 2019-12-07 PROCEDURE — 99285 EMERGENCY DEPT VISIT HI MDM: CPT | Mod: 25

## 2019-12-07 PROCEDURE — 25000132 ZZH RX MED GY IP 250 OP 250 PS 637: Mod: GY | Performed by: PLASTIC SURGERY

## 2019-12-07 PROCEDURE — 85027 COMPLETE CBC AUTOMATED: CPT | Performed by: PLASTIC SURGERY

## 2019-12-07 PROCEDURE — 85610 PROTHROMBIN TIME: CPT | Performed by: EMERGENCY MEDICINE

## 2019-12-07 PROCEDURE — 36415 COLL VENOUS BLD VENIPUNCTURE: CPT | Performed by: PLASTIC SURGERY

## 2019-12-07 RX ORDER — METHOCARBAMOL 750 MG/1
750 TABLET, FILM COATED ORAL EVERY 6 HOURS PRN
Status: DISCONTINUED | OUTPATIENT
Start: 2019-12-07 | End: 2019-12-11 | Stop reason: HOSPADM

## 2019-12-07 RX ORDER — HEPARIN SODIUM 10000 [USP'U]/100ML
1000 INJECTION, SOLUTION INTRAVENOUS CONTINUOUS
Status: DISCONTINUED | OUTPATIENT
Start: 2019-12-07 | End: 2019-12-08

## 2019-12-07 RX ORDER — LIDOCAINE 40 MG/G
CREAM TOPICAL
Status: DISCONTINUED | OUTPATIENT
Start: 2019-12-07 | End: 2019-12-11 | Stop reason: HOSPADM

## 2019-12-07 RX ORDER — ACETAMINOPHEN 325 MG/1
325-650 TABLET ORAL EVERY 6 HOURS PRN
Status: DISCONTINUED | OUTPATIENT
Start: 2019-12-07 | End: 2019-12-11 | Stop reason: HOSPADM

## 2019-12-07 RX ORDER — WARFARIN SODIUM 10 MG/1
10 TABLET ORAL ONCE
Status: DISCONTINUED | OUTPATIENT
Start: 2019-12-07 | End: 2019-12-07

## 2019-12-07 RX ORDER — EZETIMIBE 10 MG/1
10 TABLET ORAL EVERY EVENING
Status: DISCONTINUED | OUTPATIENT
Start: 2019-12-07 | End: 2019-12-11 | Stop reason: HOSPADM

## 2019-12-07 RX ORDER — ROSUVASTATIN CALCIUM 20 MG/1
40 TABLET, COATED ORAL EVERY EVENING
Status: DISCONTINUED | OUTPATIENT
Start: 2019-12-07 | End: 2019-12-11 | Stop reason: HOSPADM

## 2019-12-07 RX ORDER — MORPHINE SULFATE 2 MG/ML
.5-1 INJECTION, SOLUTION INTRAMUSCULAR; INTRAVENOUS
Status: DISCONTINUED | OUTPATIENT
Start: 2019-12-07 | End: 2019-12-11 | Stop reason: HOSPADM

## 2019-12-07 RX ORDER — FERROUS SULFATE 325(65) MG
325 TABLET ORAL
Status: DISCONTINUED | OUTPATIENT
Start: 2019-12-08 | End: 2019-12-11 | Stop reason: HOSPADM

## 2019-12-07 RX ORDER — HYDROCODONE BITARTRATE AND ACETAMINOPHEN 5; 325 MG/1; MG/1
1-2 TABLET ORAL EVERY 6 HOURS PRN
Status: DISCONTINUED | OUTPATIENT
Start: 2019-12-07 | End: 2019-12-11 | Stop reason: HOSPADM

## 2019-12-07 RX ORDER — PROCHLORPERAZINE MALEATE 5 MG
5 TABLET ORAL EVERY 6 HOURS PRN
Status: DISCONTINUED | OUTPATIENT
Start: 2019-12-07 | End: 2019-12-11 | Stop reason: HOSPADM

## 2019-12-07 RX ORDER — WARFARIN SODIUM 5 MG/1
7.5 TABLET ORAL DAILY
Status: ON HOLD | COMMUNITY
End: 2020-02-10

## 2019-12-07 RX ORDER — POLYETHYLENE GLYCOL 3350 17 G/17G
17 POWDER, FOR SOLUTION ORAL DAILY PRN
Status: DISCONTINUED | OUTPATIENT
Start: 2019-12-07 | End: 2019-12-11 | Stop reason: HOSPADM

## 2019-12-07 RX ORDER — TAMSULOSIN HYDROCHLORIDE 0.4 MG/1
0.4 CAPSULE ORAL EVERY EVENING
Status: DISCONTINUED | OUTPATIENT
Start: 2019-12-07 | End: 2019-12-11 | Stop reason: HOSPADM

## 2019-12-07 RX ORDER — WARFARIN SODIUM 5 MG/1
5 TABLET ORAL DAILY
COMMUNITY
End: 2019-12-19

## 2019-12-07 RX ORDER — NALOXONE HYDROCHLORIDE 0.4 MG/ML
.1-.4 INJECTION, SOLUTION INTRAMUSCULAR; INTRAVENOUS; SUBCUTANEOUS
Status: DISCONTINUED | OUTPATIENT
Start: 2019-12-07 | End: 2019-12-11 | Stop reason: HOSPADM

## 2019-12-07 RX ORDER — ONDANSETRON 2 MG/ML
4 INJECTION INTRAMUSCULAR; INTRAVENOUS EVERY 6 HOURS PRN
Status: DISCONTINUED | OUTPATIENT
Start: 2019-12-07 | End: 2019-12-11 | Stop reason: HOSPADM

## 2019-12-07 RX ORDER — SENNOSIDES 8.6 MG
650 CAPSULE ORAL EVERY 8 HOURS PRN
Status: ON HOLD | COMMUNITY
End: 2020-02-10

## 2019-12-07 RX ORDER — ONDANSETRON 4 MG/1
4 TABLET, ORALLY DISINTEGRATING ORAL EVERY 6 HOURS PRN
Status: DISCONTINUED | OUTPATIENT
Start: 2019-12-07 | End: 2019-12-11 | Stop reason: HOSPADM

## 2019-12-07 RX ORDER — AMOXICILLIN 250 MG
1-2 CAPSULE ORAL 2 TIMES DAILY PRN
Status: DISCONTINUED | OUTPATIENT
Start: 2019-12-07 | End: 2019-12-11 | Stop reason: HOSPADM

## 2019-12-07 RX ORDER — FUROSEMIDE 20 MG
20 TABLET ORAL DAILY
Status: DISCONTINUED | OUTPATIENT
Start: 2019-12-07 | End: 2019-12-11 | Stop reason: HOSPADM

## 2019-12-07 RX ORDER — PIPERACILLIN SODIUM, TAZOBACTAM SODIUM 2; .25 G/10ML; G/10ML
2.25 INJECTION, POWDER, LYOPHILIZED, FOR SOLUTION INTRAVENOUS EVERY 8 HOURS SCHEDULED
Status: DISCONTINUED | OUTPATIENT
Start: 2019-12-07 | End: 2019-12-07

## 2019-12-07 RX ORDER — MESALAMINE 800 MG/1
800 TABLET, DELAYED RELEASE ORAL 2 TIMES DAILY
Status: DISCONTINUED | OUTPATIENT
Start: 2019-12-07 | End: 2019-12-11 | Stop reason: HOSPADM

## 2019-12-07 RX ORDER — PIPERACILLIN SODIUM, TAZOBACTAM SODIUM 4; .5 G/20ML; G/20ML
4.5 INJECTION, POWDER, LYOPHILIZED, FOR SOLUTION INTRAVENOUS EVERY 6 HOURS
Status: DISCONTINUED | OUTPATIENT
Start: 2019-12-07 | End: 2019-12-10

## 2019-12-07 RX ORDER — PROCHLORPERAZINE 25 MG
12.5 SUPPOSITORY, RECTAL RECTAL EVERY 12 HOURS PRN
Status: DISCONTINUED | OUTPATIENT
Start: 2019-12-07 | End: 2019-12-11 | Stop reason: HOSPADM

## 2019-12-07 RX ADMIN — TAMSULOSIN HYDROCHLORIDE 0.4 MG: 0.4 CAPSULE ORAL at 21:18

## 2019-12-07 RX ADMIN — METHOCARBAMOL TABLETS 750 MG: 750 TABLET, COATED ORAL at 21:17

## 2019-12-07 RX ADMIN — ROSUVASTATIN CALCIUM 40 MG: 20 TABLET, FILM COATED ORAL at 21:17

## 2019-12-07 RX ADMIN — MESALAMINE 800 MG: 800 TABLET, DELAYED RELEASE ORAL at 22:12

## 2019-12-07 RX ADMIN — PIPERACILLIN AND TAZOBACTAM 4.5 G: 4; .5 INJECTION, POWDER, FOR SOLUTION INTRAVENOUS at 21:33

## 2019-12-07 RX ADMIN — HYDROCODONE BITARTRATE AND ACETAMINOPHEN 1 TABLET: 5; 325 TABLET ORAL at 21:18

## 2019-12-07 RX ADMIN — HEPARIN SODIUM 900 UNITS/HR: 10000 INJECTION, SOLUTION INTRAVENOUS at 21:22

## 2019-12-07 RX ADMIN — Medication 1500 MG: at 22:59

## 2019-12-07 RX ADMIN — EZETIMIBE 10 MG: 10 TABLET ORAL at 22:12

## 2019-12-07 ASSESSMENT — ACTIVITIES OF DAILY LIVING (ADL)
DRESS: 0-->INDEPENDENT
AMBULATION: 1-->ASSISTIVE EQUIPMENT
COGNITION: 1 - ATTENTION OR MEMORY DEFICITS
RETIRED_COMMUNICATION: 0-->UNDERSTANDS/COMMUNICATES WITHOUT DIFFICULTY
RETIRED_EATING: 0-->INDEPENDENT
SWALLOWING: 0-->SWALLOWS FOODS/LIQUIDS WITHOUT DIFFICULTY
FALL_HISTORY_WITHIN_LAST_SIX_MONTHS: NO
ADLS_ACUITY_SCORE: 17
TRANSFERRING: 1-->ASSISTIVE EQUIPMENT
TOILETING: 0-->INDEPENDENT
BATHING: 0-->INDEPENDENT

## 2019-12-07 ASSESSMENT — MIFFLIN-ST. JEOR: SCORE: 1548.4

## 2019-12-07 ASSESSMENT — ENCOUNTER SYMPTOMS
CHILLS: 0
WOUND: 1
FEVER: 0

## 2019-12-07 NOTE — H&P
Plastic Surgery Admit Note    HPI: Patient is a 79-year-old male known to me from his previous admission on 11/10/2019.  Patient had a history of nonhealing wound after undergoing a total knee arthroplasty in July of this year.  He recently underwent a gastrocnemius flap and skin graft to the knee.  This portion of the surgery is healing well but he developed posterior calf skin necrosis.  He is now having separation of the incision and due to his anticoagulation status, I recommended admission, reversal of the Coumadin with a heparin bridge, with plans to have surgery midweek for debridement of the devitalized tissue and a likely skin graft.  Based on the photographs he would also benefit from antibiotics.      Past Medical History:   Diagnosis Date     Abdominal pain      Abnormal ECG     RBBB, 1st degree AVB, Left axis deviation     Anemia     currently taking iron     Arrhythmia     pac, pvc     Back pain     since 1980     BPH (benign prostatic hyperplasia)      Bruit      CAD (coronary artery disease)      Cellulitis 10/18/12     Cellulitis 05/2018    GrpB strep LLE cellulitis  negative RACHAEL for veg     Chronic venous insufficiency     bilat lower extremities     Contact dermatitis and other eczema, due to unspecified cause      Diaphragmatic hernia without mention of obstruction or gangrene      Diastolic HF (heart failure) (H)      Gastric ulcer      Glucose intolerance (impaired glucose tolerance)      Heart murmur 9/16/13    valvular heart disease     Hyperlipidemia LDL goal <100 8/6/2013     Hypertension 8/6/13     Lumbago      Malaise and fatigue      Metabolic syndrome      Mobitz (type) I (Wenckebach's) atrioventricular block     and RBBB     Nocturia 10/18/12     Nonallopathic lesion of cervical region      Nonallopathic lesion of lumbar region      Nonallopathic lesion of pelvic region, not elsewhere classified      Nonallopathic lesion of rib cage      Nonallopathic lesion of sacral region      GAGE  (obstructive sleep apnea)     CPAP     Paroxysmal atrial fibrillation (H) 10/18/12     Prostate cancer (H) 2008    radiation seed, XRT      PVD (peripheral vascular disease) (H)      RBBB     1st degree AVB, RBBB, LAHB     Rotator cuff strain     and sprain     S/P AAA repair      S/P aortic valve replacement 2006    developed perivalve leak and MS, therefore redo surg 2013     S/P CABG (coronary artery bypass graft) 2006    Lima-Lad, RA-Rca     Sciatica of left side     since 2000     Sepsis due to group B Streptococcus (H) 5/19/2018     Ulcerative colitis (H)      Varicose veins of bilateral lower extremities with other complications     s/p RLE vein stripping     Vitamin D deficiency        Past Surgical History:   Procedure Laterality Date     AORTIC VALVE REPLACEMENT  1/3/06    redo AVR SJM 21mm and SJM MVR 27mm in 2013SJM 21(AGFN [id number]):AVR, SJM 27 :MVR-     APPLY WOUND VAC N/A 11/12/2019    Procedure: APPLICATION, WOUND VAC;  Surgeon: Sara Martinez MD;  Location:  OR     ARTHROPLASTY KNEE      right knee     ARTHROPLASTY KNEE Right 7/22/2019    Procedure: Right total knee arthroplasty;  Surgeon: Prasanth Jensen MD;  Location: [facility] OR     BACK SURGERY  Oct 2015    Fusion L4-5, laminectomy L2, L3     BYPASS GRAFT ARTERY CORONARY  10/2013    reimplantation of radial artery graft to RCA     C CABG, VEIN, TWO  1/3/06    Left radial to RCA, LIMA to LAD (RA to RCA reimplanted at time of 2013 surg)     CARDIAC CATHERIZATION  11/2005    Stent placed to RCA     CARDIAC CATHERIZATION  04/2013    Occluded RCA, patent LIMA to LAD and radial graft to PDA     CARPAL TUNNEL RELEASE RT/LT  1994     COLONOSCOPY  8-22-11     CYSTOSCOPY FLEXIBLE  10/16/2013    Procedure: CYSTOSCOPY FLEXIBLE;  FLEXIBLE CYSTOSCOPY / DILATION OF URETHRA / INSERTION OF LESLIE;  Surgeon: Cooper Wallace MD;  Location:  OR     ENDOVASCULAR REPAIR, INFRARENAL ABDOMINAL AORTIC ANEURYSM/DISSECTION; MODULAR BIFURCATED PROSTHESIS   2006    AAA repair endovascular     ENT SURGERY       EP ABLATION ATRIAL FLUTTER N/A 4/22/2019    Procedure: EP Ablation Atrial Flutter;  Surgeon: Jessy Vasquez MD;  Location:  HEART CARDIAC CATH LAB     GENITOURINARY SURGERY  6/16/08    radioactive seeding     GRAFT FLAP PEDICLE EXTREMITY (LOCATION) Right 11/12/2019    Procedure: SURGICAL PROCUREMENT, FLAP, PEDICLE, EXTREMITY;  Surgeon: Sara Martinez MD;  Location:  OR     GRAFT SKIN SPLIT THICKNESS FROM EXTREMITY Right 11/12/2019    Procedure: RIGHT GASTRONEMIUS FLAP TO RIGHT KNEE, SPLIT THICKNESS SKIN GRAFT FROM RIGHT THIGH TO RIGHT KNEE, SURGICAL PROCUREMENT, FLAP, PEDICLE, EXTREMITY, WOUND VAC PLACEMENT;  Surgeon: Sara Martinez MD;  Location:  OR     HEAD & NECK SURGERY  1997    vocal cord polypectomy     INCISION AND DRAINAGE KNEE, COMBINED Right 8/29/2019    Procedure: INCISION AND DRAINAGE, KNEE W/ Secondary Wound Closure;  Surgeon: Prasanth Jensen MD;  Location:  OR     KNEE SURGERY  2001 Right knee arthroscopy     OPTICAL TRACKING SYSTEM FUSION SPINE POSTERIOR LUMBAR THREE+ LEVELS N/A 10/29/2015    Procedure: OPTICAL TRACKING SYSTEM FUSION SPINE POSTERIOR LUMBAR THREE+ LEVELS;  Surgeon: Walt Garcia MD;  Location:  OR     PROSTATE SURGERY      radioactive seeding 6/16/08     REPAIR ANEURYSM ABDOMINAL AORTA  06/08     REPAIR VALVE MITRAL  10/16/2013    SJM 21(AGFN [id number]):AVR, [facility] 27  501:MVRProcedure: REPAIR VALVE MITRAL;  REDO STERNOTOMY/REDO AORTIC VALVE REPLACEMENT/ MITRAL VALVE REPLACEMENT/REIMPLANTATION OF RIGHT CORONARY ARTERY BYPASS WITH RACHAEL ( ON PUMP);  Surgeon: Viet Singh MD;  Location:  OR     REPLACE VALVE AORTIC  10/16/2013    Procedure: REPLACE VALVE AORTIC;;  Surgeon: Viet Singh MD;  Location:  OR     SURGERY GENERAL IP CONSULT  05/2008 Excision aneurysm abdominal aorta     SURGERY GENERAL IP CONSULT  1997 Vocal cord polypectomy     VASCULAR SURGERY  1968, 1993   "   varicose vein stripping       Allergies   Allergen Reactions     Bees Anaphylaxis       (Not in a hospital admission)      PE:   BP Readings from Last 1 Encounters:   12/07/19 104/72      Pulse Readings from Last 1 Encounters:   12/07/19 101      Resp Readings from Last 1 Encounters:   12/07/19 18      Temp Readings from Last 1 Encounters:   12/07/19 97.9  F (36.6  C) (Temporal)      SpO2 Readings from Last 1 Encounters:   12/07/19 93%      Wt Readings from Last 1 Encounters:   12/07/19 89.4 kg (197 lb)      Ht Readings from Last 1 Encounters:   12/07/19 1.664 m (5' 5.5\")       Exam based on my clinical evaluation this past week and photos.  Gastrocnemius muscle rotated to the anterior right knee is healing well with good skin graft take.  Some areas of eschar still present.  Posterior calf incision with signs of separation of the necrotic tissue.  Mild erythema surrounding non-vitalized tissue.    A/P: Plan admit for heparin bridging and Coumadin reversal.  Hopefully can proceed with surgery midweek.  Hospitalist consult to manage medical issues and manage coumadin/heparin bridging.   Sara Martinez MD  Plastic Surgery  [facility]  [phone] (office)          "

## 2019-12-07 NOTE — ED PROVIDER NOTES
History     Chief Complaint:  Wound Check    HPI  Fausto Farr is a 79 year old male on warfarin with a history of right knee surgery on November 12th who presents to the emergency department today for evaluation of a wound check on his right calf. The patient had to have additional surgery on this knee after the wound would not heal and then a muscle removed from his calf to be used around the knee. Both his surgical site on his knee and calf have been slow to heal. He also has limited range of motion of the knee since his surgery. The patient saw his plastic surgeon Dr. Martinez 2 days ago and was told he may need another procedure for these wounds.        Allergies:  No known drug allergies    Medications:    Cholecalciferol   Zetia  Ferosul  Lasix  Glucosamine-chondroitin   Norco  Mesalamine   Robaxin  Miralax  Crestor  Senokot  Flomax  Warfarin     Past Medical History:    Anemia  Arrhythmia  BPH   Bruit  CAD   Cellulitis  GrpB strep LLE cellulitis    Chronic venous insufficiency   Contact dermatitis   Diaphragmatic hernia   Diastolic HF   Gastric ulcer  Heart murmur  Hyperlipidemia   Hypertension  Lumbago  Malaise and fatigue  Metabolic syndrome  Mobitz (type) I (Wenckebach's) atrioventricular block   Nocturia  Nonallopathic lesion of cervical region   GAGE   Paroxysmal atrial fibrillation   Prostate cancer   PVD   RBBB  Rotator cuff strain  S/P AAA repair   S/P aortic valve replacement  Sciatica of left side  Sepsis due to group B Streptococcus   Ulcerative colitis   Varicose veins of bilateral lower extremities     Past Surgical History:    Right knee arthroscopy  Excision aneurysm abdominal aorta  Vocal cord polypectomy  endovascular repair  infrarenal abdominal aortic aneurysm/dissection  modular bifurcated prosthesis  vascular surgery   ENT surgery  Head and neck surgery  Abdomen surgery  Prostate surgery  Repair aneurysm abdominal aorta  cardiac catherization  Optical tracking system fusion spine  "posterior lumbar three+ levels  carpal tunnel release rt/lt  Arthroplasty knee bilateral  back surgery  EP Ablation Atrial Flutter   CABG, vein two  aortic valve replacement   Repair valve mitral   Replace valve aortic  Bypass graft artery coronary   Incision and drainage knee, combined right  Graft skin split thickness from extremity   Apply Wound Vac  Graft flap pedicle extremity right    Family History:    The patient's family history includes Coronary Artery Disease in his father; Heart Disease in his brother and father; No Known Problems in his brother, daughter, mother, sister, and son; Other - See Comments in his grandchild; Other Cancer in his daughter.    Social History:  The patient reports that he quit smoking about 17 years ago. He started smoking about 57 years ago. He has a 40.00 pack-year smoking history. He has never used smokeless tobacco. He reports current alcohol use. He reports that he does not use drugs.   PCP: Jodi Flower Mai  Marital Status:  [2]      Review of Systems   Constitutional: Negative for chills and fever.   Skin: Positive for wound.       Physical Exam     Patient Vitals for the past 24 hrs:   BP Temp Temp src Pulse Resp SpO2 Height Weight   12/07/19 1825 100/73 -- -- 61 -- 95 % -- --   12/07/19 1520 104/72 -- -- 101 18 -- -- --   12/07/19 1519 -- 97.9  F (36.6  C) Temporal -- 18 93 % 1.664 m (5' 5.5\") 89.4 kg (197 lb)     Physical Exam  GENERAL: well developed, pleasant  HEAD: atraumatic  EYES: pupils reactive, extraocular muscles intact, conjunctivae normal  ENT:  mucus membranes moist  NECK:  trachea midline, normal range of motion  RESPIRATORY: no tachypnea, breath sounds clear to auscultation   CVS: normal S1/S2, no murmurs, intact distal pulses  ABDOMEN: soft, nontender, nondistention  MUSCULOSKELETAL: no deformities  SKIN: wounds as noted in photos  NEURO: GCS 15, cranial nerves intact, alert and oriented x3  PSYCH:  Mood/affect normal                    Emergency " Department Course   Laboratory:  Laboratory findings were communicated with the patient who voiced understanding of the findings.    CBC: WBC 11.6 (H), HGB 10.8 (H) o/w WNL. ( )   BMP: Glucose 92, o/w WNL (Creatinine: 0.96)    INR: 2.22 (H)    Emergency Department Course:  Past medical records, nursing notes, and vitals reviewed.  1544: I performed an exam of the patient and obtained history, as documented above.     IV was inserted and blood was drawn for laboratory testing, results above.    1640: I rechecked the patient. Explained findings to patient and family.    I personally reviewed the laboratory/imaging results with the Patient and spouse and answered all related questions prior to admission.    Findings and plan explained to the Patient who consents to admission.     1650 : Discussed the patient with Dr. Martinez, who will admit the patient to a medical bed for further monitoring, evaluation, and treatment.     Impression & Plan        Medical Decision Making:  Fausto Farr is a 79 year old male who presents to the emergency department today for evaluation of wound check.  He has had grafting and notes gradual improvement to the anterior knee, but persistent problems to the posterior leg wound with bleeding tonight from distal aspect.  He was told on recent plastic surgery visit if it didn't start to improve, surgery was going to be needed again.  I spoke with Dr. Martinez and noted the photos, and patient will be admitted to her service with medicine consult.      Diagnosis:    ICD-10-CM    1. Postprocedural non-healing wound, subsequent encounter T81.89XD CBC with platelets differential     Basic metabolic panel     INR       Disposition:   Admitted to medical bed      Scribe Disclosure:  Dayna REECE, am serving as a scribe at 3:44 PM on 12/7/2019 to document services personally performed by Malik Rodríguez MD based on my observations and the provider's statements to me.     EMERGENCY  DEPARTMENT       Malik Rodríguez MD  12/07/19 4955

## 2019-12-07 NOTE — CONSULTS
[facility]  Consult Note - Hospitalist Service     Date of Admission:  12/7/2019  Consult Requested by: Plastic Surgery  Reason for Consult: Medical management    Assessment & Plan   Fausto Farr is a 79 year old male with extensive medical history but more recently complications with postoperative healing of total knee arthroplasty from July and subsequent eluding gastrocnemius flap few weeks ago on Nov 12.  Hospitalist service was consulted for assistance with medical management.  Per plastic surgery documentation, plan is for admission, heparin bridging and INR reversal, IV antibiotics, and possible surgery this upcoming week.  I have placed pharmacy consultations for anticoagulation and vancomycin dosing, IV Zosyn, and consultation from infectious disease for assistance with antibiotic regimen.    Non-healing wounds of right anterior knee and calf  s/p gastroc + skin graft Nov 12, originally had TKA in July 2019  - Operative cares and ultimate plan per Plastic Surgery - Primary team  - vancomycin (pharmacy dosing) and zosyn for coverage in concern of wound infection especially with his extensive history and cellulitis noted in chart  - bridging AC with heparin per pharmacy   -- Hospitalist service will defer to Plastic Surgery for need/urgency of possible INR reversal   - prn analgesia     History of dyslipidemia  - PTA regimen continued - zetia, statin    History of BPH  - PTA tamsulosin continued    Paroxysmal atrial fibrillation  S/p ablation - ongoing cardiac monitor  S/p Mechanical aortic and mitral valve replacement  - Follows with Dr Vasquez, reportedly consideration of pacemaker in the future  - stable on admission  - telemetry ordered  - as above heparin bridging initiated with pharmacy (appreciated), resuming AC as soon as possible would be ideal but ultimately up to surgery team    Diastolic heart failure - chronic  - not in exacerbation on admission  - pta regimen to continue -  furosemide    Noted history of CAD with CABG in 2006  - PTA med list showing statin, lasix - continued    Multiple other medical problems   - appear to be stable at this time, will address accordingly if issues arise.     The patient's care was discussed with the Patient and ER MD.    Dung Bullard MD  [facility]    ______________________________________________________________________    Chief Complaint   Non healing right lower extremity wounds    History is obtained from the patient    History of Present Illness   Fausto Farr is a 79 year old male with extensive medical history but more recently complications with postoperative healing of total knee arthroplasty from July and subsequent eluding gastrocnemius flap few weeks ago.  Patient was seen in plastic surgery clinic this past week where it appears the plan was to continue monitoring ultimately consider surgical intervention if things do not improve.  He presented to the ED with concern of his wound.  Both his anterior knee surgical site as well as the calf graft site appeared to be slow to heal.  Limited range of motion reported as well.  He has had some distal swelling in the ankle since original knee surgery and occasional tingling and numbness but none currently.  He denies fevers, chills, dysuria or diarrhea.  He has been taking his medications as directed.  Of note he is on warfarin with goal INR of 2.5-3.5 for history of mechanical aortic and mitral valves as well as paroxysmal atrial fibrillation for which he has had ablation.      In the ED he was seen by Dr. Rodríguez with whom I discussed the case.  Patient is afebrile, hemodynamically stable, saturating normally on room air.  Labs are fairly unremarkable-WBC 11.6, Hgb 10.8, .  Patient affirms the aforementioned history.  ER MD discussed with Dr. Martinez his plastic surgeon, who will be admitting the patient on their service.  Hospitalist service was consulted for  assistance with medical management.  Per plastic surgery documentation, plan is for admission, heparin bridging and INR reversal, IV antibiotics, and possible surgery this upcoming week.  I have placed pharmacy consultations for anticoagulation and vancomycin dosing, IV Zosyn, and consultation from infectious disease for assistance with antibiotic regimen.    Review of Systems   The 10 point Review of Systems is negative other than noted in the HPI or here.     Past Medical History    I have reviewed this patient's medical history and updated it with pertinent information if needed.   Past Medical History:   Diagnosis Date     Abdominal pain      Abnormal ECG     RBBB, 1st degree AVB, Left axis deviation     Anemia     currently taking iron     Arrhythmia     pac, pvc     Back pain     since 1980     BPH (benign prostatic hyperplasia)      Bruit      CAD (coronary artery disease)      Cellulitis 10/18/12     Cellulitis 05/2018    GrpB strep LLE cellulitis  negative RACHAEL for veg     Chronic venous insufficiency     bilat lower extremities     Contact dermatitis and other eczema, due to unspecified cause      Diaphragmatic hernia without mention of obstruction or gangrene      Diastolic HF (heart failure) (H)      Gastric ulcer      Glucose intolerance (impaired glucose tolerance)      Heart murmur 9/16/13    valvular heart disease     Hyperlipidemia LDL goal <100 8/6/2013     Hypertension 8/6/13     Lumbago      Malaise and fatigue      Metabolic syndrome      Mobitz (type) I (Wenckebach's) atrioventricular block     and RBBB     Nocturia 10/18/12     Nonallopathic lesion of cervical region      Nonallopathic lesion of lumbar region      Nonallopathic lesion of pelvic region, not elsewhere classified      Nonallopathic lesion of rib cage      Nonallopathic lesion of sacral region      GAGE (obstructive sleep apnea)     CPAP     Paroxysmal atrial fibrillation (H) 10/18/12     Prostate cancer (H) 2008    radiation seed,  XRT      PVD (peripheral vascular disease) (H)      RBBB     1st degree AVB, RBBB, LAHB     Rotator cuff strain     and sprain     S/P AAA repair      S/P aortic valve replacement 2006    developed perivalve leak and MS, therefore redo surg 2013     S/P CABG (coronary artery bypass graft) 2006    Lima-Lad, RA-Rca     Sciatica of left side     since 2000     Sepsis due to group B Streptococcus (H) 5/19/2018     Ulcerative colitis (H)      Varicose veins of bilateral lower extremities with other complications     s/p RLE vein stripping     Vitamin D deficiency        Past Surgical History   I have reviewed this patient's surgical history and updated it with pertinent information if needed.  Past Surgical History:   Procedure Laterality Date     AORTIC VALVE REPLACEMENT  1/3/06    redo AVR SJM 21mm and SJM MVR 27mm in 2013SJM 21([id number]):AVR, SJM 27 :MVR-     APPLY WOUND VAC N/A 11/12/2019    Procedure: APPLICATION, WOUND VAC;  Surgeon: Sara Martinez MD;  Location:  OR     ARTHROPLASTY KNEE      right knee     ARTHROPLASTY KNEE Right 7/22/2019    Procedure: Right total knee arthroplasty;  Surgeon: Prasanth Jensen MD;  Location:  OR     BACK SURGERY  Oct 2015    Fusion L4-5, laminectomy L2, L3     BYPASS GRAFT ARTERY CORONARY  10/2013    reimplantation of radial artery graft to RCA     C CABG, VEIN, TWO  1/3/06    Left radial to RCA, LIMA to LAD (RA to RCA reimplanted at time of 2013 surg)     CARDIAC CATHERIZATION  11/2005    Stent placed to RCA     CARDIAC CATHERIZATION  04/2013    Occluded RCA, patent LIMA to LAD and radial graft to PDA     CARPAL TUNNEL RELEASE RT/LT  1994     COLONOSCOPY  8-22-11     CYSTOSCOPY FLEXIBLE  10/16/2013    Procedure: CYSTOSCOPY FLEXIBLE;  FLEXIBLE CYSTOSCOPY / DILATION OF URETHRA / INSERTION OF LESLIE;  Surgeon: Cooper Wallace MD;  Location:  OR     ENDOVASCULAR REPAIR, INFRARENAL ABDOMINAL AORTIC ANEURYSM/DISSECTION; MODULAR BIFURCATED PROSTHESIS  2006     AAA repair endovascular     ENT SURGERY       EP ABLATION ATRIAL FLUTTER N/A 4/22/2019    Procedure: EP Ablation Atrial Flutter;  Surgeon: Jessy Vasquez MD;  Location:  HEART CARDIAC CATH LAB     GENITOURINARY SURGERY  6/16/08    radioactive seeding     GRAFT FLAP PEDICLE EXTREMITY (LOCATION) Right 11/12/2019    Procedure: SURGICAL PROCUREMENT, FLAP, PEDICLE, EXTREMITY;  Surgeon: Sara Martinez MD;  Location:  OR     GRAFT SKIN SPLIT THICKNESS FROM EXTREMITY Right 11/12/2019    Procedure: RIGHT GASTRONEMIUS FLAP TO RIGHT KNEE, SPLIT THICKNESS SKIN GRAFT FROM RIGHT THIGH TO RIGHT KNEE, SURGICAL PROCUREMENT, FLAP, PEDICLE, EXTREMITY, WOUND VAC PLACEMENT;  Surgeon: Sara Martinez MD;  Location:  OR     HEAD & NECK SURGERY  1997    vocal cord polypectomy     INCISION AND DRAINAGE KNEE, COMBINED Right 8/29/2019    Procedure: INCISION AND DRAINAGE, KNEE W/ Secondary Wound Closure;  Surgeon: Prasanth Jensen MD;  Location:  OR     KNEE SURGERY  2001 Right knee arthroscopy     OPTICAL TRACKING SYSTEM FUSION SPINE POSTERIOR LUMBAR THREE+ LEVELS N/A 10/29/2015    Procedure: OPTICAL TRACKING SYSTEM FUSION SPINE POSTERIOR LUMBAR THREE+ LEVELS;  Surgeon: Walt Garcia MD;  Location:  OR     PROSTATE SURGERY      radioactive seeding 6/16/08     REPAIR ANEURYSM ABDOMINAL AORTA  06/08     REPAIR VALVE MITRAL  10/16/2013    SJM 21([id number]):AVR, SJM 27 :MVRProcedure: REPAIR VALVE MITRAL;  REDO STERNOTOMY/REDO AORTIC VALVE REPLACEMENT/ MITRAL VALVE REPLACEMENT/REIMPLANTATION OF RIGHT CORONARY ARTERY BYPASS WITH RACHAEL ( ON PUMP);  Surgeon: Viet Singh MD;  Location:  OR     REPLACE VALVE AORTIC  10/16/2013    Procedure: REPLACE VALVE AORTIC;;  Surgeon: Viet Singh MD;  Location:  OR     SURGERY GENERAL IP CONSULT  05/2008 Excision aneurysm abdominal aorta     SURGERY GENERAL IP CONSULT  1997 Vocal cord polypectomy     VASCULAR SURGERY  1968, 1993      varicose vein stripping       Social History   I have reviewed this patient's social history and updated it with pertinent information if needed.  Social History     Tobacco Use     Smoking status: Former Smoker     Packs/day: 1.00     Years: 40.00     Pack years: 40.00     Start date: 4/15/1962     Last attempt to quit: 10/23/2002     Years since quittin.1     Smokeless tobacco: Never Used   Substance Use Topics     Alcohol use: Yes     Frequency: 2-4 times a month     Drinks per session: 1 or 2     Comment: a couple beers per week (socially)     Drug use: No       Family History   I have reviewed this patient's family history and updated it with pertinent information if needed.   Family History   Problem Relation Age of Onset     No Known Problems Mother      Coronary Artery Disease Father         CABG     Heart Disease Father         Pacemaker     No Known Problems Brother      No Known Problems Sister      No Known Problems Son      Other Cancer Daughter      No Known Problems Daughter      Heart Disease Brother      Other - See Comments Grandchild        Medications   I have reviewed this patient's current medications    Allergies   Allergies   Allergen Reactions     Bees Anaphylaxis       Physical Exam   Vital Signs: Temp: 97.9  F (36.6  C) Temp src: Temporal BP: 104/72 Pulse: 101   Resp: 18 SpO2: 93 %      Weight: 197 lbs 0 oz    Gen: NAD, pleasant  HEENT: Normocephalic, EOMI, MMM  Resp: no crackles,  no wheezes, no increased work of resp  CV: S1S2 heard, irreg irreg rhythm, reg rate, right ankle pitting pedal edema  Abdo: soft, nontender, nondistended, bowel sounds present  Ext: calves nontender, well perfused; right leg wounds dressed - c/d/i  Neuro: AAOx3, CN grossly intact, no facial asymmetry      Data   Reviewed and noted

## 2019-12-07 NOTE — PHARMACY-ADMISSION MEDICATION HISTORY
Pharmacy Medication History  Admission medication history interview status for the 12/7/2019  admission is complete. See EPIC admission navigator for prior to admission medications     Medication history sources: Patient  Medication history source reliability: Good  Adherence assessment: Good    Significant changes made to the medication list:  Medication removed:  - Polyethlyene glycol packets      Additional medication history information:   Patient was taken off metoprolol tartrate about 2 months ago, at around the same time he received a heart monitor. Patient's doctor discontinued metoprolol because patient's blood pressure was low (around 90/42 mmHg).    Medication reconciliation completed by provider prior to medication history? Yes    Time spent in this activity: 20 minutes      Prior to Admission medications    Medication Sig Last Dose Taking? Auth Provider   acetaminophen (TYLENOL) 650 MG CR tablet Take 650 mg by mouth every 8 hours as needed for mild pain or fever  at PRN Yes Unknown, Entered By History   betamethasone valerate (VALISONE) 0.1 % external lotion Apply topically 2 times daily Apply topically to scalp twice daily PRN  at PRN Yes Jodi Flower Mai, MD   cholecalciferol 25 MCG (1000 UT) TABS Take 1,000 Units by mouth daily 12/7/2019 at am Yes Unknown, Entered By History   ezetimibe (ZETIA) 10 MG tablet Take 1 tablet (10 mg) by mouth daily 12/6/2019 at pm Yes Magdiel Dumas MD   ferrous sulfate (FEROSUL) 325 (65 Fe) MG tablet Take 325 mg by mouth daily (with breakfast) 12/7/2019 at am Yes Unknown, Entered By History   fluocinonide (LIDEX) 0.05 % external ointment Apply topically 2 times daily as needed  at PRN Yes Reported, Patient   furosemide (LASIX) 40 MG tablet Take 0.5 tablets (20 mg) by mouth daily 12/7/2019 at am Yes Tu Reyes MD   glucosamine-chondroitin 500-400 MG CAPS per capsule Take 1 capsule by mouth 2 times daily 12/7/2019 at x1 Yes Unknown, Entered By History    HYDROcodone-acetaminophen (NORCO) 5-325 MG tablet Take 1-2 tablets by mouth every 6 hours as needed for severe pain  at PRN Yes Reba Escobar APRN CNP   mesalamine (ASACOL HD) 800 MG EC tablet Take 1 tablet (800 mg) by mouth 2 times daily 12/7/2019 at x1 Yes Tu Reyes MD   methocarbamol (ROBAXIN) 750 MG tablet Take 1 tablet (750 mg) by mouth every 6 hours as needed for muscle spasms  at PRN Yes Reba Escobar APRN CNP   rosuvastatin (CRESTOR) 40 MG tablet Take 1 tablet (40 mg) by mouth daily 12/6/2019 at pm Yes Magdiel Dumas MD   senna-docusate (SENOKOT-S/PERICOLACE) 8.6-50 MG tablet Take 1-2 tablets by mouth 2 times daily as needed for constipation  at PRN Yes Jack Spann MD   tamsulosin (FLOMAX) 0.4 MG capsule TAKE 1 CAPSULE BY MOUTH EVERY DAY 12/6/2019 at pm Yes Tu Reyes MD   warfarin ANTICOAGULANT (COUMADIN) 5 MG tablet Take 5 mg by mouth daily On Mondays, Wednesdays, and Fridays 12/6/2019 at pm Yes Unknown, Entered By History   warfarin ANTICOAGULANT (COUMADIN) 5 MG tablet Take 7.5 mg by mouth daily On Sundays, Tuesdays, Thursdays, and Saturdays 12/5/2019 Yes Unknown, Entered By History

## 2019-12-07 NOTE — ED NOTES
"[facility]  ED Nurse Handoff Report    ED Chief complaint: Wound Check      ED Diagnosis:   Final diagnoses:   Postprocedural non-healing wound, subsequent encounter       Code Status: Full Code    Allergies:   Allergies   Allergen Reactions     Bees Anaphylaxis       Activity level - Baseline/Home:  Independent  Activity Level - Current:   Independent    Patient's Preferred language: English   Needed?: No    Isolation: Yes  Infection: Not Applicable  MRSA  Bariatric?: No    Vital Signs:   Vitals:    12/07/19 1519 12/07/19 1520   BP:  104/72   Pulse:  101   Resp: 18 18   Temp: 97.9  F (36.6  C)    TempSrc: Temporal    SpO2: 93%    Weight: 89.4 kg (197 lb)    Height: 1.664 m (5' 5.5\")        Cardiac Rhythm: ,        Pain level: 0-10 Pain Scale: 4    Is this patient confused?: No   Does this patient have a guardian?  No         If yes, is there guardianship documents in the Epic \"Code/ACP\" activity?  N/A         Guardian Notified?  N/A  [place] - Suicide Severity Rating Scale Completed?  Yes  If yes, what color did the patient score?  White    Patient Report: Initial Complaint: Pt comes in to get right leg wound evaluated.  Focused Assessment: A/Ox3, right leg wound red, has yellow exudate with a smell. Pt. States he is having some pain  Tests Performed: labs  Abnormal Results:   Results for orders placed or performed during the hospital encounter of 12/07/19   CBC with platelets differential     Status: Abnormal   Result Value Ref Range    WBC 11.6 (H) 4.0 - 11.0 10e9/L    RBC Count 3.86 (L) 4.4 - 5.9 10e12/L    Hemoglobin 10.8 (L) 13.3 - 17.7 g/dL    Hematocrit 34.4 (L) 40.0 - 53.0 %    MCV 89 78 - 100 fl    MCH 28.0 26.5 - 33.0 pg    MCHC 31.4 (L) 31.5 - 36.5 g/dL    RDW 13.8 10.0 - 15.0 %    Platelet Count 396 150 - 450 10e9/L    Diff Method Automated Method     % Neutrophils 74.0 %    % Lymphocytes 14.4 %    % Monocytes 9.4 %    % Eosinophils 1.6 %    % Basophils 0.3 %    % Immature " Granulocytes 0.3 %    Nucleated RBCs 0 0 /100    Absolute Neutrophil 8.6 (H) 1.6 - 8.3 10e9/L    Absolute Lymphocytes 1.7 0.8 - 5.3 10e9/L    Absolute Monocytes 1.1 0.0 - 1.3 10e9/L    Absolute Eosinophils 0.2 0.0 - 0.7 10e9/L    Absolute Basophils 0.0 0.0 - 0.2 10e9/L    Abs Immature Granulocytes 0.0 0 - 0.4 10e9/L    Absolute Nucleated RBC 0.0    Basic metabolic panel     Status: None   Result Value Ref Range    Sodium 136 133 - 144 mmol/L    Potassium 3.6 3.4 - 5.3 mmol/L    Chloride 103 94 - 109 mmol/L    Carbon Dioxide 30 20 - 32 mmol/L    Anion Gap 3 3 - 14 mmol/L    Glucose 92 70 - 99 mg/dL    Urea Nitrogen 21 7 - 30 mg/dL    Creatinine 0.96 0.66 - 1.25 mg/dL    GFR Estimate 75 >60 mL/min/[1.73_m2]    GFR Estimate If Black 87 >60 mL/min/[1.73_m2]    Calcium 8.8 8.5 - 10.1 mg/dL   INR     Status: Abnormal   Result Value Ref Range    INR 2.22 (H) 0.86 - 1.14       Treatments provided: Dressing change    Family Comments: Wife in the room    OBS brochure/video discussed/provided to patient/family: NO              Name of person given brochure if not patient: NA              Relationship to patient: NA    ED Medications: Medications - No data to display    Drips infusing?:  No    For the majority of the shift this patient was Green.   Interventions performed were Updated on plan of care.    Severe Sepsis OR Septic Shock Diagnosis Present:  NO       To be done/followed up on inpatient unit:  Continue with cares.    ED NURSE PHONE NUMBER: [phone]

## 2019-12-08 ENCOUNTER — ANESTHESIA (OUTPATIENT)
Dept: SURGERY | Facility: CLINIC | Age: 79
DRG: 920 | End: 2019-12-08
Payer: MEDICARE

## 2019-12-08 ENCOUNTER — ANESTHESIA EVENT (OUTPATIENT)
Dept: SURGERY | Facility: CLINIC | Age: 79
DRG: 920 | End: 2019-12-08
Payer: MEDICARE

## 2019-12-08 LAB
CREAT SERPL-MCNC: 0.99 MG/DL (ref 0.66–1.25)
GFR SERPL CREATININE-BSD FRML MDRD: 72 ML/MIN/{1.73_M2}
INR PPP: 2.11 (ref 0.86–1.14)
LMWH PPP CHRO-ACNC: 0.22 IU/ML
LMWH PPP CHRO-ACNC: <0.1 IU/ML

## 2019-12-08 PROCEDURE — 71000012 ZZH RECOVERY PHASE 1 LEVEL 1 FIRST HR: Performed by: PLASTIC SURGERY

## 2019-12-08 PROCEDURE — 25000125 ZZHC RX 250: Performed by: PLASTIC SURGERY

## 2019-12-08 PROCEDURE — 25800030 ZZH RX IP 258 OP 636: Performed by: ANESTHESIOLOGY

## 2019-12-08 PROCEDURE — 25000128 H RX IP 250 OP 636: Performed by: PLASTIC SURGERY

## 2019-12-08 PROCEDURE — 25000128 H RX IP 250 OP 636: Performed by: ANESTHESIOLOGY

## 2019-12-08 PROCEDURE — 37000008 ZZH ANESTHESIA TECHNICAL FEE, 1ST 30 MIN: Performed by: PLASTIC SURGERY

## 2019-12-08 PROCEDURE — 25000566 ZZH SEVOFLURANE, EA 15 MIN: Performed by: PLASTIC SURGERY

## 2019-12-08 PROCEDURE — 25000128 H RX IP 250 OP 636: Performed by: NURSE ANESTHETIST, CERTIFIED REGISTERED

## 2019-12-08 PROCEDURE — 36000050 ZZH SURGERY LEVEL 2 1ST 30 MIN: Performed by: PLASTIC SURGERY

## 2019-12-08 PROCEDURE — 85610 PROTHROMBIN TIME: CPT | Performed by: PLASTIC SURGERY

## 2019-12-08 PROCEDURE — 25800030 ZZH RX IP 258 OP 636: Performed by: NURSE ANESTHETIST, CERTIFIED REGISTERED

## 2019-12-08 PROCEDURE — 25000128 H RX IP 250 OP 636: Performed by: HOSPITALIST

## 2019-12-08 PROCEDURE — 82565 ASSAY OF CREATININE: CPT | Performed by: PLASTIC SURGERY

## 2019-12-08 PROCEDURE — 99232 SBSQ HOSP IP/OBS MODERATE 35: CPT | Performed by: HOSPITALIST

## 2019-12-08 PROCEDURE — 25000132 ZZH RX MED GY IP 250 OP 250 PS 637: Mod: GY | Performed by: PLASTIC SURGERY

## 2019-12-08 PROCEDURE — 25000125 ZZHC RX 250: Performed by: NURSE ANESTHETIST, CERTIFIED REGISTERED

## 2019-12-08 PROCEDURE — 37000009 ZZH ANESTHESIA TECHNICAL FEE, EACH ADDTL 15 MIN: Performed by: PLASTIC SURGERY

## 2019-12-08 PROCEDURE — 12000000 ZZH R&B MED SURG/OB

## 2019-12-08 PROCEDURE — 0HDKXZZ EXTRACTION OF RIGHT LOWER LEG SKIN, EXTERNAL APPROACH: ICD-10-PCS | Performed by: PLASTIC SURGERY

## 2019-12-08 PROCEDURE — 25800030 ZZH RX IP 258 OP 636: Performed by: PLASTIC SURGERY

## 2019-12-08 PROCEDURE — 27210794 ZZH OR GENERAL SUPPLY STERILE: Performed by: PLASTIC SURGERY

## 2019-12-08 PROCEDURE — 25000128 H RX IP 250 OP 636: Performed by: INTERNAL MEDICINE

## 2019-12-08 PROCEDURE — 25800030 ZZH RX IP 258 OP 636: Performed by: INTERNAL MEDICINE

## 2019-12-08 PROCEDURE — 36415 COLL VENOUS BLD VENIPUNCTURE: CPT | Performed by: PLASTIC SURGERY

## 2019-12-08 PROCEDURE — 85520 HEPARIN ASSAY: CPT | Performed by: PLASTIC SURGERY

## 2019-12-08 PROCEDURE — 40000169 ZZH STATISTIC PRE-PROCEDURE ASSESSMENT I: Performed by: PLASTIC SURGERY

## 2019-12-08 PROCEDURE — 36000052 ZZH SURGERY LEVEL 2 EA 15 ADDTL MIN: Performed by: PLASTIC SURGERY

## 2019-12-08 RX ORDER — WARFARIN SODIUM 10 MG/1
10 TABLET ORAL
Status: COMPLETED | OUTPATIENT
Start: 2019-12-08 | End: 2019-12-08

## 2019-12-08 RX ORDER — LIDOCAINE HYDROCHLORIDE 20 MG/ML
INJECTION, SOLUTION INFILTRATION; PERINEURAL PRN
Status: DISCONTINUED | OUTPATIENT
Start: 2019-12-08 | End: 2019-12-08

## 2019-12-08 RX ORDER — NALOXONE HYDROCHLORIDE 0.4 MG/ML
.1-.4 INJECTION, SOLUTION INTRAMUSCULAR; INTRAVENOUS; SUBCUTANEOUS
Status: DISCONTINUED | OUTPATIENT
Start: 2019-12-08 | End: 2019-12-08

## 2019-12-08 RX ORDER — FENTANYL CITRATE 50 UG/ML
25-100 INJECTION, SOLUTION INTRAMUSCULAR; INTRAVENOUS
Status: COMPLETED | OUTPATIENT
Start: 2019-12-08 | End: 2019-12-08

## 2019-12-08 RX ORDER — FENTANYL CITRATE 50 UG/ML
25-50 INJECTION, SOLUTION INTRAMUSCULAR; INTRAVENOUS
Status: DISCONTINUED | OUTPATIENT
Start: 2019-12-08 | End: 2019-12-08 | Stop reason: HOSPADM

## 2019-12-08 RX ORDER — SODIUM CHLORIDE, SODIUM LACTATE, POTASSIUM CHLORIDE, CALCIUM CHLORIDE 600; 310; 30; 20 MG/100ML; MG/100ML; MG/100ML; MG/100ML
INJECTION, SOLUTION INTRAVENOUS CONTINUOUS
Status: DISCONTINUED | OUTPATIENT
Start: 2019-12-08 | End: 2019-12-08 | Stop reason: HOSPADM

## 2019-12-08 RX ORDER — HEPARIN SODIUM 10000 [USP'U]/100ML
1100 INJECTION, SOLUTION INTRAVENOUS CONTINUOUS
Status: DISCONTINUED | OUTPATIENT
Start: 2019-12-08 | End: 2019-12-10

## 2019-12-08 RX ORDER — MINERAL OIL
OIL (ML) MISCELLANEOUS PRN
Status: DISCONTINUED | OUTPATIENT
Start: 2019-12-08 | End: 2019-12-08 | Stop reason: HOSPADM

## 2019-12-08 RX ORDER — ONDANSETRON 2 MG/ML
4 INJECTION INTRAMUSCULAR; INTRAVENOUS EVERY 30 MIN PRN
Status: DISCONTINUED | OUTPATIENT
Start: 2019-12-08 | End: 2019-12-08 | Stop reason: HOSPADM

## 2019-12-08 RX ORDER — MAGNESIUM HYDROXIDE 1200 MG/15ML
LIQUID ORAL PRN
Status: DISCONTINUED | OUTPATIENT
Start: 2019-12-08 | End: 2019-12-08 | Stop reason: HOSPADM

## 2019-12-08 RX ORDER — GLYCOPYRROLATE 0.2 MG/ML
INJECTION, SOLUTION INTRAMUSCULAR; INTRAVENOUS PRN
Status: DISCONTINUED | OUTPATIENT
Start: 2019-12-08 | End: 2019-12-08

## 2019-12-08 RX ORDER — PROPOFOL 10 MG/ML
INJECTION, EMULSION INTRAVENOUS PRN
Status: DISCONTINUED | OUTPATIENT
Start: 2019-12-08 | End: 2019-12-08

## 2019-12-08 RX ORDER — NEOSTIGMINE METHYLSULFATE 1 MG/ML
VIAL (ML) INJECTION PRN
Status: DISCONTINUED | OUTPATIENT
Start: 2019-12-08 | End: 2019-12-08

## 2019-12-08 RX ORDER — HYDROMORPHONE HYDROCHLORIDE 1 MG/ML
.3-.5 INJECTION, SOLUTION INTRAMUSCULAR; INTRAVENOUS; SUBCUTANEOUS EVERY 5 MIN PRN
Status: DISCONTINUED | OUTPATIENT
Start: 2019-12-08 | End: 2019-12-08 | Stop reason: HOSPADM

## 2019-12-08 RX ORDER — ONDANSETRON 4 MG/1
4 TABLET, ORALLY DISINTEGRATING ORAL EVERY 30 MIN PRN
Status: DISCONTINUED | OUTPATIENT
Start: 2019-12-08 | End: 2019-12-08 | Stop reason: HOSPADM

## 2019-12-08 RX ADMIN — HEPARIN SODIUM 1100 UNITS/HR: 10000 INJECTION, SOLUTION INTRAVENOUS at 17:03

## 2019-12-08 RX ADMIN — FENTANYL CITRATE 50 MCG: 50 INJECTION, SOLUTION INTRAMUSCULAR; INTRAVENOUS at 12:15

## 2019-12-08 RX ADMIN — MESALAMINE 800 MG: 800 TABLET, DELAYED RELEASE ORAL at 08:06

## 2019-12-08 RX ADMIN — ROSUVASTATIN CALCIUM 40 MG: 20 TABLET, FILM COATED ORAL at 20:44

## 2019-12-08 RX ADMIN — SODIUM CHLORIDE, POTASSIUM CHLORIDE, SODIUM LACTATE AND CALCIUM CHLORIDE: 600; 310; 30; 20 INJECTION, SOLUTION INTRAVENOUS at 11:37

## 2019-12-08 RX ADMIN — FUROSEMIDE 20 MG: 20 TABLET ORAL at 08:06

## 2019-12-08 RX ADMIN — PHENYLEPHRINE HYDROCHLORIDE 100 MCG: 10 INJECTION INTRAVENOUS at 12:30

## 2019-12-08 RX ADMIN — GLYCOPYRROLATE 0.8 MG: 0.2 INJECTION, SOLUTION INTRAMUSCULAR; INTRAVENOUS at 12:56

## 2019-12-08 RX ADMIN — FERROUS SULFATE TAB 325 MG (65 MG ELEMENTAL FE) 325 MG: 325 (65 FE) TAB at 08:06

## 2019-12-08 RX ADMIN — PHENYLEPHRINE HYDROCHLORIDE 200 MCG: 10 INJECTION INTRAVENOUS at 12:44

## 2019-12-08 RX ADMIN — METHOCARBAMOL TABLETS 750 MG: 750 TABLET, COATED ORAL at 21:24

## 2019-12-08 RX ADMIN — PHENYLEPHRINE HYDROCHLORIDE 100 MCG: 10 INJECTION INTRAVENOUS at 12:42

## 2019-12-08 RX ADMIN — METHOCARBAMOL TABLETS 750 MG: 750 TABLET, COATED ORAL at 06:54

## 2019-12-08 RX ADMIN — WARFARIN SODIUM 10 MG: 10 TABLET ORAL at 18:39

## 2019-12-08 RX ADMIN — LIDOCAINE HYDROCHLORIDE 40 MG: 20 INJECTION, SOLUTION INFILTRATION; PERINEURAL at 12:15

## 2019-12-08 RX ADMIN — PIPERACILLIN AND TAZOBACTAM 4.5 G: 4; .5 INJECTION, POWDER, FOR SOLUTION INTRAVENOUS at 06:49

## 2019-12-08 RX ADMIN — HYDROMORPHONE HYDROCHLORIDE 0.5 MG: 1 INJECTION, SOLUTION INTRAMUSCULAR; INTRAVENOUS; SUBCUTANEOUS at 13:30

## 2019-12-08 RX ADMIN — ROCURONIUM BROMIDE 50 MG: 10 INJECTION INTRAVENOUS at 12:15

## 2019-12-08 RX ADMIN — PIPERACILLIN AND TAZOBACTAM 4.5 G: 4; .5 INJECTION, POWDER, FOR SOLUTION INTRAVENOUS at 18:40

## 2019-12-08 RX ADMIN — Medication 3000 UNITS: at 04:45

## 2019-12-08 RX ADMIN — DAPTOMYCIN 600 MG: 500 INJECTION, POWDER, LYOPHILIZED, FOR SOLUTION INTRAVENOUS at 11:31

## 2019-12-08 RX ADMIN — SENNOSIDES AND DOCUSATE SODIUM 1 TABLET: 8.6; 5 TABLET ORAL at 15:31

## 2019-12-08 RX ADMIN — NEOSTIGMINE METHYLSULFATE 5 MG: 1 INJECTION, SOLUTION INTRAVENOUS at 12:56

## 2019-12-08 RX ADMIN — SODIUM CHLORIDE, POTASSIUM CHLORIDE, SODIUM LACTATE AND CALCIUM CHLORIDE: 600; 310; 30; 20 INJECTION, SOLUTION INTRAVENOUS at 12:03

## 2019-12-08 RX ADMIN — TAMSULOSIN HYDROCHLORIDE 0.4 MG: 0.4 CAPSULE ORAL at 20:44

## 2019-12-08 RX ADMIN — HYDROCODONE BITARTRATE AND ACETAMINOPHEN 2 TABLET: 5; 325 TABLET ORAL at 15:22

## 2019-12-08 RX ADMIN — FENTANYL CITRATE 50 MCG: 50 INJECTION, SOLUTION INTRAMUSCULAR; INTRAVENOUS at 12:27

## 2019-12-08 RX ADMIN — PIPERACILLIN AND TAZOBACTAM 4.5 G: 4; .5 INJECTION, POWDER, FOR SOLUTION INTRAVENOUS at 02:27

## 2019-12-08 RX ADMIN — EZETIMIBE 10 MG: 10 TABLET ORAL at 20:44

## 2019-12-08 RX ADMIN — Medication 1500 MG: at 08:17

## 2019-12-08 RX ADMIN — PHENYLEPHRINE HYDROCHLORIDE 200 MCG: 10 INJECTION INTRAVENOUS at 12:32

## 2019-12-08 RX ADMIN — SODIUM CHLORIDE, POTASSIUM CHLORIDE, SODIUM LACTATE AND CALCIUM CHLORIDE: 600; 310; 30; 20 INJECTION, SOLUTION INTRAVENOUS at 13:23

## 2019-12-08 RX ADMIN — HYDROCODONE BITARTRATE AND ACETAMINOPHEN 1 TABLET: 5; 325 TABLET ORAL at 21:25

## 2019-12-08 RX ADMIN — PHENYLEPHRINE HYDROCHLORIDE 100 MCG: 10 INJECTION INTRAVENOUS at 12:22

## 2019-12-08 RX ADMIN — MESALAMINE 800 MG: 800 TABLET, DELAYED RELEASE ORAL at 20:45

## 2019-12-08 RX ADMIN — FENTANYL CITRATE 50 MCG: 50 INJECTION, SOLUTION INTRAMUSCULAR; INTRAVENOUS at 13:53

## 2019-12-08 RX ADMIN — Medication 4000 UNITS: at 17:05

## 2019-12-08 RX ADMIN — HYDROCODONE BITARTRATE AND ACETAMINOPHEN 1 TABLET: 5; 325 TABLET ORAL at 06:51

## 2019-12-08 RX ADMIN — PROPOFOL 150 MG: 10 INJECTION, EMULSION INTRAVENOUS at 12:15

## 2019-12-08 ASSESSMENT — ACTIVITIES OF DAILY LIVING (ADL)
ADLS_ACUITY_SCORE: 17
ADLS_ACUITY_SCORE: 21
ADLS_ACUITY_SCORE: 17
ADLS_ACUITY_SCORE: 21
ADLS_ACUITY_SCORE: 17

## 2019-12-08 ASSESSMENT — ENCOUNTER SYMPTOMS: DYSRHYTHMIAS: 1

## 2019-12-08 ASSESSMENT — MIFFLIN-ST. JEOR: SCORE: 1563.82

## 2019-12-08 ASSESSMENT — LIFESTYLE VARIABLES: TOBACCO_USE: 1

## 2019-12-08 NOTE — PHARMACY-VANCOMYCIN DOSING SERVICE
Pharmacy Vancomycin Initial Note  Date of Service 2019  Patient's  1940  79 year old, male    Indication: Skin and Soft Tissue Infection    Current estimated CrCl = Estimated Creatinine Clearance: 65.8 mL/min (based on SCr of 0.96 mg/dL).    Creatinine for last 3 days  2019:  4:40 PM Creatinine 0.96 mg/dL    Recent Vancomycin Level(s) for last 3 days  No results found for requested labs within last 72 hours.      Vancomycin IV Administrations (past 72 hours)      No vancomycin orders with administrations in past 72 hours.                Nephrotoxins and other renal medications (From now, onward)    Start     Dose/Rate Route Frequency Ordered Stop    19  vancomycin 1500 mg in 0.9% NaCl 250 ml intermittent infusion 1,500 mg      1,500 mg  over 90 Minutes Intravenous EVERY 12 HOURS 19  furosemide (LASIX) tablet 20 mg      20 mg Oral DAILY 19 193  piperacillin-tazobactam (ZOSYN) 4.5 g vial to attach to  mL bag     Note to Pharmacy:  Pharmacy can adjust dose based on renal function.    4.5 g  over 30 Minutes Intravenous EVERY 6 HOURS 19            Contrast Orders - past 72 hours (72h ago, onward)    None                Plan:  1.  Start vancomycin  1500 mg IV q12h.   2.  Goal Trough Level: 10-15 mg/L   3.  Pharmacy will check trough levels as appropriate in 1-3 Days.    4. Serum creatinine levels will be ordered daily for the first week of therapy and at least twice weekly for subsequent weeks.    5. [place]ur method utilized to dose vancomycin therapy: Method 1    Jeanine Go [facility]

## 2019-12-08 NOTE — PHARMACY-ANTICOAGULATION SERVICE
Clinical Pharmacy - Warfarin Dosing Consult     Pharmacy has been consulted to manage this patient s warfarin therapy.  Indication: Atrial Fibrillation;Mechanical Aortic Valve Replacement;Mechanical Mitral Valve Replacement  Therapy Goal: INR 2.5-3.5  Warfarin Prior to Admission: Yes  Warfarin PTA Regimen: 5mg MWF; 7.5mg ROW    INR   Date Value Ref Range Status   12/07/2019 2.22 (H) 0.86 - 1.14 Final   12/03/2019 3.6 (A) 0.8 - 1.1 Final       Recommend warfarin 10 mg today.  Pharmacy will monitor Fausto Farr daily and order warfarin doses to achieve specified goal.      Please contact pharmacy as soon as possible if the warfarin needs to be held for a procedure or if the warfarin goals change.

## 2019-12-08 NOTE — PROGRESS NOTES
[facility]  Hospitalist Progress Note        Pito Cespedes MD   12/08/2019        Interval History:      -no acute issues overnight, plan for OR today, discussed with Dr. Martinez         Assessment and Plan:        Fausto Farr is a 79 year old male with extensive medical history including BPH, HTN, Ulcerative colitis CAD s/p CABG, chronic venous insufficiency, diastolic heart failure, history of gastric ulcer, GAGE on CPAP, paroxysmal atrial fibrillation (s/p ablation (4/2019), prostate cancer status post radiation therapy, history of AAA repair , h/o mechanical aortic and mitral valve replacement, chronic anemia.    He was recently admitted from 11/10 to 11/19/19 for Chronic wound of right anterior knee following total knee arthroplasty (7/2019) with a visible tendon - s/p right gastrocnemius muscle transfer to right knee and split-thickness skin graft from right thigh to right knee on 11/12/2019. He is now admitted to plastic surgery for further management of non-healing wound on right knee.       # Non-healing wounds of Chronic right anterior knee and calf  # s/p recent gastroc + skin graft Nov 12, originally had TKA in July 2019  - Operative cares and ultimate plan per Plastic Surgery - Primary team  - planned for wound debridement and STSG 12/8/19  - empirically started on Vancomycin and zosyn; ID following; Vancomycin switched to Daptomycin per ID     # Atrial fibrillation and atrial flutter, status post ablation (4/2019).  # Bradycardia postoperatively related to chronic conduction disease with worsening due to med effect.  # Status post mechanical aortic and mitral valve replacements on chronic anticoagulation.  - during last hospital stay post op he was noted with intermittent second degree AVB and bradycardia to 30's-40's. PTA metoprolol stopped 11/17 (multiple doses had been held prior due to HR parameters). Was seen by EP and felt pt may have had some symptoms and PPM  "recommended but not immediately given recent surgery  - was discharge on 30d cardiac monitor from last hospital stay; to follow EP outpatient  - rate controlled off metoprolol  - coumadin held for surgery and started on IV Heparin drip (held for 4 hrs prior to surgery); resume anticoagulation with heparin and Coumadin as soon as possible after surgery  - INR goal 2.5-3.5    # Coronary artery disease, s/p CABG (along with AVR) in 2006, h/o redo sternotomy with mechanical AVR and MVR 2013 .  # Hypertension (benign essential)  # Dyslipidemia  - continue PTA Lasix; Metoprolol d/marina last admission due to bradycardia  - continue Zetia, Crestor     #Anemia, suspect chronic component.  - recent baseline Hb around 10  - No overt clinical signs of major bleeding.  - Hb 9.7; monitor Hb    # PAD.  - H/o AAA stent repair.     # Obesity, BMI of 32.22.  # Obstructive sleep apnea.  - Continue CPAP.     # Benign prostatic hypertrophy.  - Continue tamsulosin.     # Ulcerative colitis.  - Continue mesalamine.    # DVT Prophylaxis: to resume anticoagulation post surgery as soon as possible when okay with surgery    #Code status: Full code    Disposition: likely 2-3 days per primary                     Physical Exam:      Blood pressure 97/60, pulse 61, temperature 98.2  F (36.8  C), temperature source Oral, resp. rate 18, height 1.664 m (5' 5.5\"), weight 90.9 kg (200 lb 6.4 oz), SpO2 95 %.  Vitals:    12/07/19 1519 12/08/19 0503   Weight: 89.4 kg (197 lb) 90.9 kg (200 lb 6.4 oz)     Vital Signs with Ranges  Temp:  [97.9  F (36.6  C)-98.6  F (37  C)] 98.2  F (36.8  C)  Pulse:  [] 61  Heart Rate:  [95] 95  Resp:  [18] 18  BP: ()/(53-73) 97/60  SpO2:  [93 %-95 %] 95 %  I/O's Last 24 hours  No intake/output data recorded.    Constitutional: Alert, awake and oriented X 3; lying comfortably in bed in no apparent distress   HEENT: Pupils equal and reactive to light and accomodation, EOMI intact; neck supple no raised JVD or " rigidity    Oral cavity: Moist mucosa   Cardiovascular: Normal s1 s2, irregularly regular rhythm, systolic click murmur +   Lungs: B/l clear to auscultation, no wheezes or crepitations   Abdomen: Soft, nt, nd, no guarding, rigidity or rebound; BS +   LE :  right knee in bandages also noted dressing on thigh    Musculoskeletal: Power 5/5 in all extremities   Neuro: No focal neurological deficits noted, CN II to XII grossly intact   Psychiatry: normal mood and affect                Medications:          ezetimibe  10 mg Oral QPM     ferrous sulfate  325 mg Oral Daily with breakfast     furosemide  20 mg Oral Daily     mesalamine  800 mg Oral BID     piperacillin-tazobactam  4.5 g Intravenous Q6H     rosuvastatin  40 mg Oral QPM     sodium chloride (PF)  3 mL Intracatheter Q8H     tamsulosin  0.4 mg Oral QPM     vancomycin (VANCOCIN) IV  1,500 mg Intravenous Q12H     PRN Meds: acetaminophen, HYDROcodone-acetaminophen, lidocaine 4%, lidocaine (buffered or not buffered), melatonin, methocarbamol, morphine, naloxone, ondansetron **OR** ondansetron, - MEDICATION INSTRUCTIONS -, polyethylene glycol, prochlorperazine **OR** prochlorperazine **OR** prochlorperazine, senna-docusate, sodium chloride (PF)         Data:      All new lab and imaging data was reviewed.   Recent Labs   Lab Test 12/08/19 0356 12/07/19 2025 12/07/19  1640 12/03/19 11/26/19  1114   WBC  --  10.9 11.6*  --   --  12.8*   HGB  --  9.7* 10.8*  --   --  11.6*   MCV  --  89 89  --   --  90   PLT  --  341 396  --   --  524*   INR 2.11*  --  2.22* 3.6*   < > 2.03*    < > = values in this interval not displayed.      Recent Labs   Lab Test 12/08/19 0356 12/07/19  1640 11/15/19  0806 11/14/19  0753 11/13/19  0804   NA  --  136 137  --  135   POTASSIUM  --  3.6 3.9  --  4.4   CHLORIDE  --  103 102  --  103   CO2  --  30 30  --  27   BUN  --  21 14  --  14   CR 0.99 0.96 0.85  --  0.79   ANIONGAP  --  3 5  --  5   AWILDA  --  8.8 8.6  --  8.8   GLC  --  92 139*  104* 125*     Recent Labs   Lab Test 05/18/18  0550 05/18/18  0200 11/29/13  0010   TROPI 0.029 0.042 0.033

## 2019-12-08 NOTE — ANESTHESIA CARE TRANSFER NOTE
Patient: Fausto Alli Yordan    Procedure(s):  DEBRIDEMENT OF RIGHT CALF AND WOUND CLOSURE.    Diagnosis: Open wound, lower leg [S84.858N]  Diagnosis Additional Information: No value filed.    Anesthesia Type:   General, ETT     Note:  Airway :Face Mask  Patient transferred to:PACU  Comments: Spont. Resps, pt. Responding.  Extubated, sufficient air exchange. Oxygen mask placed with 10 L O2. To PACU VSS, Monitors on. Report to RNHandoff Report: Identifed the Patient, Identified the Reponsible Provider, Reviewed the pertinent medical history, Discussed the surgical course, Reviewed Intra-OP anesthesia mangement and issues during anesthesia, Set expectations for post-procedure period and Allowed opportunity for questions and acknowledgement of understanding      Vitals: (Last set prior to Anesthesia Care Transfer)    CRNA VITALS  12/8/2019 1236 - 12/8/2019 1320      12/8/2019             Pulse:  73    SpO2:  100 %    Resp Rate (set):  10                Electronically Signed By: ADELE Eller CRNA  December 8, 2019  1:20 PM

## 2019-12-08 NOTE — PLAN OF CARE
Patient A&Ox4. VSS on RA. Tele sinus rhythm w/ BBB. CMS intact. RLE dressings clean, dry, and intact. Pain managed with PRN Norco and Robaxin. Up with SBA to the bathroom, voiding adequately. Heparin drip infusing. Coccyx red blanchable, Mepilex applied. Awaiting surgical plan. Will continue to monitor.

## 2019-12-08 NOTE — PROGRESS NOTES
"Plastic Surgery    Patient doing well. No complaints other than he wants to \"get this done\".     PE: Temp: 98.2  F (36.8  C) Temp src: Oral BP: 97/60 Pulse: 61 Heart Rate: 95 Resp: 18 SpO2: 95 % O2 Device: None (Room air)      Right leg dressing in place.   INR: 2.11    A/P:  Plan wound debridement and STSG today if possible.OR available today. Will hold heparin and place proceeding around noon. Discussed with hospitalist.    Sara Martinez MD  Plastic Surgery  [facility]  [phone] (office)        "

## 2019-12-08 NOTE — ANESTHESIA POSTPROCEDURE EVALUATION
Patient: Fausto Alli Yordan    Procedure(s):  DEBRIDEMENT OF RIGHT CALF AND WOUND CLOSURE.    Diagnosis:Open wound, lower leg [S81.809A]  Diagnosis Additional Information: No value filed.    Anesthesia Type:  General, ETT    Note:  Anesthesia Post Evaluation    Patient location during evaluation: PACU  Patient participation: Able to fully participate in evaluation  Level of consciousness: awake  Pain management: adequate  Airway patency: patent  Cardiovascular status: acceptable  Respiratory status: acceptable  Hydration status: acceptable  PONV: controlled     Anesthetic complications: None          Last vitals:  Vitals:    12/08/19 1400 12/08/19 1408 12/08/19 1410   BP: 111/65  117/63   Pulse: 72  74   Resp: 18 20 27   Temp:      SpO2: 96% 96% 97%         Electronically Signed By: Kike Dunlap MD  December 8, 2019  2:17 PM

## 2019-12-08 NOTE — PROGRESS NOTES
RECEIVING UNIT ED HANDOFF REVIEW    ED Nurse Handoff Report was reviewed by: Aure Petersen RN on December 7, 2019 at 6:27 PM

## 2019-12-08 NOTE — BRIEF OP NOTE
[facility]    Brief Operative Note    Pre-operative diagnosis: Open wound, lower leg [R90.592O]  Post-operative diagnosis open wound posterior right calf    Procedure: Procedure(s):  DEBRIDEMENT OF RIGHT CALF AND WOUND CLOSURE.  Surgeon: Surgeon(s) and Role:     * Sara Martinez MD - Primary  Anesthesia: General   Estimated blood loss: Minimal  Drains: None  Specimens: * No specimens in log *  Findings:   Able to primarily close wound after debridement; wound appeared clean, no purulence.  Complications: None.  Implants: * No implants in log *     Plan:  Resume coumadin tonight. Continue antibiotics for 24 hours after surgery then discontinue.

## 2019-12-08 NOTE — PROGRESS NOTES
Consult noted, patient in the OR, on Vanco and zosyn for skin soft tissue infection, MRSA history,   Continue zosyn  Stop vanco given CKD And start dapto.     Cassandra Floyd MD  Infectious Disease

## 2019-12-08 NOTE — PROVIDER NOTIFICATION
Spoke to hospitalist he said to start the Heparin gtt and be sure to call and clarify with Dr. Martinez.    Spoke to Dr. Martinez she gave the ok to start Heparin with the coumadin.  And stated it is ok for patient to be WBAT to RLE.

## 2019-12-08 NOTE — PROVIDER NOTIFICATION
Kenneth hospitalist, he spoke to plastic surgery, informed that surgery is scheduled for noon today.  Heparin gtt stopped at 7:30am.

## 2019-12-08 NOTE — ANESTHESIA PREPROCEDURE EVALUATION
Anesthesia Pre-Procedure Evaluation    Patient: Fausto Farr   MRN: [id number] : 1940          Preoperative Diagnosis: Open wound, lower leg [S81.046W]    Procedure(s):  SKIN GRAFT FROM RIGHT THIGH TO RIGHT CALF  RIGHT LOWER EXTREMITY WOUND VAC APPLICATION    Past Medical History:   Diagnosis Date     Abdominal pain      Abnormal ECG     RBBB, 1st degree AVB, Left axis deviation     Anemia     currently taking iron     Arrhythmia     pac, pvc     Back pain     since      BPH (benign prostatic hyperplasia)      Bruit      CAD (coronary artery disease)      Cellulitis 10/18/12     Cellulitis 2018    GrpB strep LLE cellulitis  negative RACHAEL for veg     Chronic venous insufficiency     bilat lower extremities     Contact dermatitis and other eczema, due to unspecified cause      Diaphragmatic hernia without mention of obstruction or gangrene      Diastolic HF (heart failure) (H)      Gastric ulcer      Glucose intolerance (impaired glucose tolerance)      Heart murmur 13    valvular heart disease     Hyperlipidemia LDL goal <100 2013     Hypertension 13     Lumbago      Malaise and fatigue      Metabolic syndrome      Mobitz (type) I (Wenckebach's) atrioventricular block     and RBBB     Nocturia 10/18/12     Nonallopathic lesion of cervical region      Nonallopathic lesion of lumbar region      Nonallopathic lesion of pelvic region, not elsewhere classified      Nonallopathic lesion of rib cage      Nonallopathic lesion of sacral region      GAGE (obstructive sleep apnea)     CPAP     Paroxysmal atrial fibrillation (H) 10/18/12     Prostate cancer (H)     radiation seed, XRT      PVD (peripheral vascular disease) (H)      RBBB     1st degree AVB, RBBB, LAHB     Rotator cuff strain     and sprain     S/P AAA repair      S/P aortic valve replacement     developed perivalve leak and MS, therefore redo surg      S/P CABG (coronary artery bypass graft)     Aguirre-Lad, RA-Rca      Sciatica of left side     since 2000     Sepsis due to group B Streptococcus (H) 5/19/2018     Ulcerative colitis (H)      Varicose veins of bilateral lower extremities with other complications     s/p RLE vein stripping     Vitamin D deficiency      Past Surgical History:   Procedure Laterality Date     AORTIC VALVE REPLACEMENT  1/3/06    redo AVR SJM 21mm and SJM MVR 27mm in 2013SJM 21([id number]):AVR, SJM 27 :MVR-     APPLY WOUND VAC N/A 11/12/2019    Procedure: APPLICATION, WOUND VAC;  Surgeon: Sara Martinez MD;  Location:  OR     ARTHROPLASTY KNEE      right knee     ARTHROPLASTY KNEE Right 7/22/2019    Procedure: Right total knee arthroplasty;  Surgeon: Prasanth Jnesen MD;  Location:  OR     BACK SURGERY  Oct 2015    Fusion L4-5, laminectomy L2, L3     BYPASS GRAFT ARTERY CORONARY  10/2013    reimplantation of radial artery graft to RCA     C CABG, VEIN, TWO  1/3/06    Left radial to RCA, LIMA to LAD (RA to RCA reimplanted at time of 2013 surg)     CARDIAC CATHERIZATION  11/2005    Stent placed to RCA     CARDIAC CATHERIZATION  04/2013    Occluded RCA, patent LIMA to LAD and radial graft to PDA     CARPAL TUNNEL RELEASE RT/LT  1994     COLONOSCOPY  8-22-11     CYSTOSCOPY FLEXIBLE  10/16/2013    Procedure: CYSTOSCOPY FLEXIBLE;  FLEXIBLE CYSTOSCOPY / DILATION OF URETHRA / INSERTION OF LESLIE;  Surgeon: Cooper Wallace MD;  Location: [facility]     ENDOVASCULAR REPAIR, INFRARENAL ABDOMINAL AORTIC ANEURYSM/DISSECTION; MODULAR BIFURCATED PROSTHESIS  2006    AAA repair endovascular     ENT SURGERY       EP ABLATION ATRIAL FLUTTER N/A 4/22/2019    Procedure: EP Ablation Atrial Flutter;  Surgeon: Jessy Vasquez MD;  Location:  [facility]     GENITOURINARY SURGERY  6/16/08    radioactive seeding     GRAFT FLAP PEDICLE EXTREMITY (LOCATION) Right 11/12/2019    Procedure: SURGICAL PROCUREMENT, FLAP, PEDICLE, EXTREMITY;  Surgeon: Sara Martinez MD;  Location:  OR      GRAFT SKIN SPLIT THICKNESS FROM EXTREMITY Right 11/12/2019    Procedure: RIGHT GASTRONEMIUS FLAP TO RIGHT KNEE, SPLIT THICKNESS SKIN GRAFT FROM RIGHT THIGH TO RIGHT KNEE, SURGICAL PROCUREMENT, FLAP, PEDICLE, EXTREMITY, WOUND VAC PLACEMENT;  Surgeon: Sara Martinez MD;  Location:  OR     HEAD & NECK SURGERY  1997    vocal cord polypectomy     INCISION AND DRAINAGE KNEE, COMBINED Right 8/29/2019    Procedure: INCISION AND DRAINAGE, KNEE W/ Secondary Wound Closure;  Surgeon: Prasanth Jensen MD;  Location: [facility] OR     KNEE SURGERY  2001 Right knee arthroscopy     OPTICAL TRACKING SYSTEM FUSION SPINE POSTERIOR LUMBAR THREE+ LEVELS N/A 10/29/2015    Procedure: OPTICAL TRACKING SYSTEM FUSION SPINE POSTERIOR LUMBAR THREE+ LEVELS;  Surgeon: Walt Garcia MD;  Location:  OR     PROSTATE SURGERY      radioactive seeding 6/16/08     REPAIR ANEURYSM ABDOMINAL AORTA  06/08     REPAIR VALVE MITRAL  10/16/2013    SJM 21([id number]):AVR, SJM 27 :MVRProcedure: REPAIR VALVE MITRAL;  REDO STERNOTOMY/REDO AORTIC VALVE REPLACEMENT/ MITRAL VALVE REPLACEMENT/REIMPLANTATION OF RIGHT CORONARY ARTERY BYPASS WITH RACHAEL ( ON PUMP);  Surgeon: Viet Singh MD;  Location:  OR     REPLACE VALVE AORTIC  10/16/2013    Procedure: REPLACE VALVE AORTIC;;  Surgeon: Viet Singh MD;  Location:  OR     SURGERY GENERAL IP CONSULT  05/2008 Excision aneurysm abdominal aorta     SURGERY GENERAL IP CONSULT  1997 Vocal cord polypectomy     VASCULAR SURGERY  1968, 1993     varicose vein stripping       Anesthesia Evaluation     . Pt has had prior anesthetic.     No history of anesthetic complications          ROS/MED HX    ENT/Pulmonary:     (+)sleep apnea, tobacco use, uses CPAP , . .    Neurologic:       Cardiovascular: Comment: RBBB and LAFB  Per patient he's been wearing a heart monitor, and told by EP that they might place a pacemaker in a month or so, depending on results    (+) Dyslipidemia,  hypertension-range: controlled, Peripheral Vascular Disease-CAD, -CABG-stent,. : . CHF . . :. dysrhythmias (sp ablation for flutter 4/2019) a-flutter and a-fib, valvular problems/murmurs sp mechanical MVR and AVR:. Previous cardiac testing Echodate:5/2018results:Interpretation Summary     1. The left ventricle is normal in structure, function and size. The visual ejection fraction is estimated at 55%.  2. The right ventricle is mildly dilated. Mildly decreased right ventricular systolic function  3. Mechanical mitral valve. 27mm St Solo. Both leaflets open well, mean gradient 4-5mmHg (normal). Physiologic MR. No vegetations.  4. There is a mechanical aortic valve. 21mm St Solo. Views are limited of the valve leaflets. Mean gradient 11mmHg. No AI. Probably no vegetations.Stress Testdate:4/2019 results:59% EF, only fixed defects, none reversible   date: results: date: results:         (-) angina and angina   METS/Exercise Tolerance:     Hematologic:     (+) Anemia, -      Musculoskeletal:         GI/Hepatic:     (+) hiatal hernia, Inflammatory bowel disease (ulcerative cholitis),      (-) GERD   Renal/Genitourinary:         Endo:     (+) Obesity (BMI 32), .      Psychiatric:         Infectious Disease:         Malignancy:   (+) Malignancy History of Prostate          Other:                          Physical Exam  Normal systems: cardiovascular and pulmonary    Airway   Mallampati: II  TM distance: >3 FB  Neck ROM: full    Dental   (+) missing    Cardiovascular       Pulmonary             Lab Results   Component Value Date    WBC 10.9 12/07/2019    HGB 9.7 (L) 12/07/2019    HCT 30.1 (L) 12/07/2019     12/07/2019     12/07/2019    POTASSIUM 3.6 12/07/2019    CHLORIDE 103 12/07/2019    CO2 30 12/07/2019    BUN 21 12/07/2019    CR 0.99 12/08/2019    GLC 92 12/07/2019    AWILDA 8.8 12/07/2019    PHOS 3.9 11/13/2019    MAG 2.0 11/13/2019    ALBUMIN 2.9 (L) 11/13/2019    PROTTOTAL 6.6 (L) 11/13/2019    ALT 18  "11/13/2019    AST 22 11/13/2019    ALKPHOS 57 11/13/2019    BILITOTAL 0.3 11/13/2019    PTT 37 10/16/2013    INR 2.11 (H) 12/08/2019    FIBR 229 10/16/2013    TSH 0.54 11/13/2019    T4 0.79 11/21/2005       Preop Vitals  BP Readings from Last 3 Encounters:   12/07/19 97/60   11/26/19 110/66   11/19/19 125/54    Pulse Readings from Last 3 Encounters:   12/07/19 61   11/26/19 79   11/19/19 56      Resp Readings from Last 3 Encounters:   12/08/19 18   11/26/19 16   11/19/19 17    SpO2 Readings from Last 3 Encounters:   12/07/19 95%   11/26/19 97%   11/19/19 97%      Temp Readings from Last 1 Encounters:   12/07/19 36.8  C (98.2  F) (Oral)    Ht Readings from Last 1 Encounters:   12/07/19 1.664 m (5' 5.5\")      Wt Readings from Last 1 Encounters:   12/08/19 90.9 kg (200 lb 6.4 oz)    Estimated body mass index is 32.84 kg/m  as calculated from the following:    Height as of this encounter: 1.664 m (5' 5.5\").    Weight as of this encounter: 90.9 kg (200 lb 6.4 oz).       Anesthesia Plan      History & Physical Review  History and physical reviewed and following examination; no interval change.    ASA Status:  3 .    NPO Status:  > 8 hours    Plan for General and ETT with Intravenous induction. Maintenance will be Balanced.    PONV prophylaxis:  Ondansetron (or other 5HT-3)  Pt had no problems with his last anesthetic around 3weeks ago.  Plan to use similar meds.        Postoperative Care  Postoperative pain management:  IV analgesics.      Consents  Anesthetic plan, risks, benefits and alternatives discussed with:  Patient..                 Kike Dunlap MD  "

## 2019-12-09 LAB
CK SERPL-CCNC: 40 U/L (ref 30–300)
ERYTHROCYTE [DISTWIDTH] IN BLOOD BY AUTOMATED COUNT: 14 % (ref 10–15)
HCT VFR BLD AUTO: 34.1 % (ref 40–53)
HGB BLD-MCNC: 10.7 G/DL (ref 13.3–17.7)
INR PPP: 1.72 (ref 0.86–1.14)
LMWH PPP CHRO-ACNC: 0.2 IU/ML
MCH RBC QN AUTO: 28.2 PG (ref 26.5–33)
MCHC RBC AUTO-ENTMCNC: 31.4 G/DL (ref 31.5–36.5)
MCV RBC AUTO: 90 FL (ref 78–100)
PLATELET # BLD AUTO: 396 10E9/L (ref 150–450)
RBC # BLD AUTO: 3.8 10E12/L (ref 4.4–5.9)
WBC # BLD AUTO: 10.6 10E9/L (ref 4–11)

## 2019-12-09 PROCEDURE — 25000128 H RX IP 250 OP 636: Performed by: HOSPITALIST

## 2019-12-09 PROCEDURE — 36415 COLL VENOUS BLD VENIPUNCTURE: CPT | Performed by: PLASTIC SURGERY

## 2019-12-09 PROCEDURE — 25000128 H RX IP 250 OP 636: Performed by: PLASTIC SURGERY

## 2019-12-09 PROCEDURE — 25800030 ZZH RX IP 258 OP 636: Performed by: INTERNAL MEDICINE

## 2019-12-09 PROCEDURE — 25000128 H RX IP 250 OP 636: Performed by: INTERNAL MEDICINE

## 2019-12-09 PROCEDURE — 25000132 ZZH RX MED GY IP 250 OP 250 PS 637: Mod: GY | Performed by: PLASTIC SURGERY

## 2019-12-09 PROCEDURE — 85610 PROTHROMBIN TIME: CPT | Performed by: PLASTIC SURGERY

## 2019-12-09 PROCEDURE — 85027 COMPLETE CBC AUTOMATED: CPT | Performed by: PLASTIC SURGERY

## 2019-12-09 PROCEDURE — 99232 SBSQ HOSP IP/OBS MODERATE 35: CPT | Performed by: HOSPITALIST

## 2019-12-09 PROCEDURE — 82550 ASSAY OF CK (CPK): CPT | Performed by: PLASTIC SURGERY

## 2019-12-09 PROCEDURE — 25000132 ZZH RX MED GY IP 250 OP 250 PS 637: Mod: GY | Performed by: HOSPITALIST

## 2019-12-09 PROCEDURE — 12000000 ZZH R&B MED SURG/OB

## 2019-12-09 PROCEDURE — 85520 HEPARIN ASSAY: CPT | Performed by: PLASTIC SURGERY

## 2019-12-09 RX ORDER — WARFARIN SODIUM 10 MG/1
10 TABLET ORAL
Status: COMPLETED | OUTPATIENT
Start: 2019-12-09 | End: 2019-12-09

## 2019-12-09 RX ADMIN — METHOCARBAMOL TABLETS 750 MG: 750 TABLET, COATED ORAL at 22:35

## 2019-12-09 RX ADMIN — METHOCARBAMOL TABLETS 750 MG: 750 TABLET, COATED ORAL at 03:41

## 2019-12-09 RX ADMIN — DAPTOMYCIN 600 MG: 500 INJECTION, POWDER, LYOPHILIZED, FOR SOLUTION INTRAVENOUS at 11:42

## 2019-12-09 RX ADMIN — MESALAMINE 800 MG: 800 TABLET, DELAYED RELEASE ORAL at 09:08

## 2019-12-09 RX ADMIN — HYDROCODONE BITARTRATE AND ACETAMINOPHEN 1 TABLET: 5; 325 TABLET ORAL at 22:35

## 2019-12-09 RX ADMIN — MESALAMINE 800 MG: 800 TABLET, DELAYED RELEASE ORAL at 22:05

## 2019-12-09 RX ADMIN — HYDROCODONE BITARTRATE AND ACETAMINOPHEN 1 TABLET: 5; 325 TABLET ORAL at 11:01

## 2019-12-09 RX ADMIN — PIPERACILLIN AND TAZOBACTAM 4.5 G: 4; .5 INJECTION, POWDER, FOR SOLUTION INTRAVENOUS at 18:50

## 2019-12-09 RX ADMIN — PIPERACILLIN AND TAZOBACTAM 4.5 G: 4; .5 INJECTION, POWDER, FOR SOLUTION INTRAVENOUS at 00:32

## 2019-12-09 RX ADMIN — TAMSULOSIN HYDROCHLORIDE 0.4 MG: 0.4 CAPSULE ORAL at 22:05

## 2019-12-09 RX ADMIN — HYDROCODONE BITARTRATE AND ACETAMINOPHEN 1 TABLET: 5; 325 TABLET ORAL at 17:14

## 2019-12-09 RX ADMIN — METHOCARBAMOL TABLETS 750 MG: 750 TABLET, COATED ORAL at 17:14

## 2019-12-09 RX ADMIN — ROSUVASTATIN CALCIUM 40 MG: 20 TABLET, FILM COATED ORAL at 22:06

## 2019-12-09 RX ADMIN — SENNOSIDES AND DOCUSATE SODIUM 1 TABLET: 8.6; 5 TABLET ORAL at 09:08

## 2019-12-09 RX ADMIN — WARFARIN SODIUM 10 MG: 10 TABLET ORAL at 17:13

## 2019-12-09 RX ADMIN — HEPARIN SODIUM 1100 UNITS/HR: 10000 INJECTION, SOLUTION INTRAVENOUS at 09:10

## 2019-12-09 RX ADMIN — PIPERACILLIN AND TAZOBACTAM 4.5 G: 4; .5 INJECTION, POWDER, FOR SOLUTION INTRAVENOUS at 12:43

## 2019-12-09 RX ADMIN — EZETIMIBE 10 MG: 10 TABLET ORAL at 22:05

## 2019-12-09 RX ADMIN — METHOCARBAMOL TABLETS 750 MG: 750 TABLET, COATED ORAL at 11:01

## 2019-12-09 RX ADMIN — PIPERACILLIN AND TAZOBACTAM 4.5 G: 4; .5 INJECTION, POWDER, FOR SOLUTION INTRAVENOUS at 12:27

## 2019-12-09 RX ADMIN — HYDROCODONE BITARTRATE AND ACETAMINOPHEN 2 TABLET: 5; 325 TABLET ORAL at 03:41

## 2019-12-09 RX ADMIN — FUROSEMIDE 20 MG: 20 TABLET ORAL at 09:08

## 2019-12-09 RX ADMIN — FERROUS SULFATE TAB 325 MG (65 MG ELEMENTAL FE) 325 MG: 325 (65 FE) TAB at 09:08

## 2019-12-09 RX ADMIN — PIPERACILLIN AND TAZOBACTAM 4.5 G: 4; .5 INJECTION, POWDER, FOR SOLUTION INTRAVENOUS at 06:50

## 2019-12-09 ASSESSMENT — ACTIVITIES OF DAILY LIVING (ADL)
ADLS_ACUITY_SCORE: 21

## 2019-12-09 NOTE — CONSULTS
[facility]    Infectious Disease Consultation     Date of Admission:  12/7/2019  Date of Consult (When I saw the patient): 12/09/19    Assessment & Plan   Fausto Farr is a 79 year old male who was admitted on 12/7/2019.     Impression:  1. 79 y.o male with CAD, history of valve replacement.   2. History of AAA repair.   3. S/P CABG.   4.Recently admitted from 11/10 to 11/19/19 for Chronic wound of right anterior knee following total knee arthroplasty (7/2019) with a visible tendon - s/p right gastrocnemius muscle transfer to right knee and split-thickness skin graft from right thigh to right knee on 11/12/2019. He is now admitted to for further management of non-healing wound on right knee.  5. He is s/p DEBRIDEMENT OF RIGHT CALF AND WOUND CLOSURE. No obvious infection found during surgery.     Recommendations:   Perioperative broad spectrum antibiotics and then stop after  24- 48 hours post surgery.   Will sign off, please call ques.           Cassandra Floyd MD    Reason for Consult   Reason for consult: I was asked by Dr. Ramos to evaluate this patient for possible infection.    Primary Care Physician   Chris Flower    Chief Complaint   Non healing wound     History is obtained from the patient and medical records    History of Present Illness   Fausto Farr is a 79 year old male with HTN, Ulcerative colitis CAD s/p CABG, chronic venous insufficiency, diastolic heart failure, history of gastric ulcer, GAGE on CPAP, paroxysmal atrial fibrillation (s/p ablation (4/2019), prostate cancer status post radiation therapy, history of AAA repair , h/o mechanical aortic and mitral valve replacement, chronic anemia.     He was recently admitted from 11/10 to 11/19/19 for Chronic wound of right anterior knee following total knee arthroplasty (7/2019) with a visible tendon - s/p right gastrocnemius muscle transfer to right knee and split-thickness skin graft from right thigh to right knee on  11/12/2019. He is now admitted to for further management of non-healing wound on right knee.         Past Medical History   I have reviewed this patient's medical history and updated it with pertinent information if needed.   Past Medical History:   Diagnosis Date     Abdominal pain      Abnormal ECG     RBBB, 1st degree AVB, Left axis deviation     Anemia     currently taking iron     Arrhythmia     pac, pvc     Back pain     since 1980     BPH (benign prostatic hyperplasia)      Bruit      CAD (coronary artery disease)      Cellulitis 10/18/12     Cellulitis 05/2018    GrpB strep LLE cellulitis  negative RACHAEL for veg     Chronic venous insufficiency     bilat lower extremities     Contact dermatitis and other eczema, due to unspecified cause      Diaphragmatic hernia without mention of obstruction or gangrene      Diastolic HF (heart failure) (H)      Gastric ulcer      Glucose intolerance (impaired glucose tolerance)      Heart murmur 9/16/13    valvular heart disease     Hyperlipidemia LDL goal <100 8/6/2013     Hypertension 8/6/13     Lumbago      Malaise and fatigue      Metabolic syndrome      Mobitz (type) I (Wenckebach's) atrioventricular block     and RBBB     Nocturia 10/18/12     Nonallopathic lesion of cervical region      Nonallopathic lesion of lumbar region      Nonallopathic lesion of pelvic region, not elsewhere classified      Nonallopathic lesion of rib cage      Nonallopathic lesion of sacral region      GAGE (obstructive sleep apnea)     CPAP     Paroxysmal atrial fibrillation (H) 10/18/12     Prostate cancer (H) 2008    radiation seed, XRT      PVD (peripheral vascular disease) (H)      RBBB     1st degree AVB, RBBB, LAHB     Rotator cuff strain     and sprain     S/P AAA repair      S/P aortic valve replacement 2006    developed perivalve leak and MS, therefore redo surg 2013     S/P CABG (coronary artery bypass graft) 2006    Lima-Lad, RA-Rca     Sciatica of left side     since 2000     Sepsis  due to group B Streptococcus (H) 5/19/2018     Ulcerative colitis (H)      Varicose veins of bilateral lower extremities with other complications     s/p RLE vein stripping     Vitamin D deficiency        Past Surgical History   I have reviewed this patient's surgical history and updated it with pertinent information if needed.  Past Surgical History:   Procedure Laterality Date     AORTIC VALVE REPLACEMENT  1/3/06    redo AVR SJM 21mm and SJM MVR 27mm in 2013SJM 21([id number]):AVR, SJM 27 :MVR-     APPLY WOUND VAC N/A 11/12/2019    Procedure: APPLICATION, WOUND VAC;  Surgeon: Sara Martinez MD;  Location:  OR     ARTHROPLASTY KNEE      right knee     ARTHROPLASTY KNEE Right 7/22/2019    Procedure: Right total knee arthroplasty;  Surgeon: Prasanth Jensen MD;  Location:  OR     BACK SURGERY  Oct 2015    Fusion L4-5, laminectomy L2, L3     BYPASS GRAFT ARTERY CORONARY  10/2013    reimplantation of radial artery graft to RCA     C CABG, VEIN, TWO  1/3/06    Left radial to RCA, LIMA to LAD (RA to RCA reimplanted at time of 2013 surg)     CARDIAC CATHERIZATION  11/2005    Stent placed to RCA     CARDIAC CATHERIZATION  04/2013    Occluded RCA, patent LIMA to LAD and radial graft to PDA     CARPAL TUNNEL RELEASE RT/LT  1994     COLONOSCOPY  8-22-11     CYSTOSCOPY FLEXIBLE  10/16/2013    Procedure: CYSTOSCOPY FLEXIBLE;  FLEXIBLE CYSTOSCOPY / DILATION OF URETHRA / INSERTION OF LESLIE;  Surgeon: Cooper Wallace MD;  Location:  OR     ENDOVASCULAR REPAIR, INFRARENAL ABDOMINAL AORTIC ANEURYSM/DISSECTION; MODULAR BIFURCATED PROSTHESIS  2006    AAA repair endovascular     ENT SURGERY       EP ABLATION ATRIAL FLUTTER N/A 4/22/2019    Procedure: EP Ablation Atrial Flutter;  Surgeon: Jessy Vasquez MD;  Location:  HE[facility] CATH [facility]     GENITOURINARY SURGERY  6/16/08    radioactive seeding     GRAFT FLAP PEDICLE EXTREMITY (LOCATION) Right 11/12/2019    Procedure: SURGICAL PROCUREMENT,  FLAP, PEDICLE, EXTREMITY;  Surgeon: Sara Martinez MD;  Location: [facility] OR     GRAFT SKIN SPLIT THICKNESS FROM EXTREMITY Right 11/12/2019    Procedure: RIGHT GASTRONEMIUS FLAP TO RIGHT KNEE, SPLIT THICKNESS SKIN GRAFT FROM RIGHT THIGH TO RIGHT KNEE, SURGICAL PROCUREMENT, FLAP, PEDICLE, EXTREMITY, WOUND VAC PLACEMENT;  Surgeon: Sara Martinez MD;  Location:  OR     HEAD & NECK SURGERY  1997    vocal cord polypectomy     INCISION AND DRAINAGE KNEE, COMBINED Right 8/29/2019    Procedure: INCISION AND DRAINAGE, KNEE W/ Secondary Wound Closure;  Surgeon: Prasanth Jensen MD;  Location:  OR     IRRIGATION AND DEBRIDEMENT LOWER EXTREMITY, COMBINED Right 12/8/2019    Procedure: DEBRIDEMENT OF RIGHT CALF AND WOUND CLOSURE.;  Surgeon: Sara Martinez MD;  Location:  OR     KNEE SURGERY  2001 Right knee arthroscopy     OPTICAL TRACKING SYSTEM FUSION SPINE POSTERIOR LUMBAR THREE+ LEVELS N/A 10/29/2015    Procedure: OPTICAL TRACKING SYSTEM FUSION SPINE POSTERIOR LUMBAR THREE+ LEVELS;  Surgeon: Walt Garcia MD;  Location:  OR     PROSTATE SURGERY      radioactive seeding 6/16/08     REPAIR ANEURYSM ABDOMINAL AORTA  06/08     REPAIR VALVE MITRAL  10/16/2013    SJM 21([id number] [id number]):AVR, SJM 27 :MVRProcedure: REPAIR VALVE MITRAL;  REDO STERNOTOMY/REDO AORTIC VALVE REPLACEMENT/ MITRAL VALVE REPLACEMENT/REIMPLANTATION OF RIGHT CORONARY ARTERY BYPASS WITH RACHAEL ( ON PUMP);  Surgeon: Viet Singh MD;  Location:  OR     REPLACE VALVE AORTIC  10/16/2013    Procedure: REPLACE VALVE AORTIC;;  Surgeon: Viet Singh MD;  Location:  OR     SURGERY GENERAL IP CONSULT  05/2008 Excision aneurysm abdominal aorta     SURGERY GENERAL IP CONSULT  1997 Vocal cord polypectomy     VASCULAR SURGERY  1968, 1993     varicose vein stripping       Prior to Admission Medications   Prior to Admission Medications   Prescriptions Last Dose Informant Patient Reported? Taking?    HYDROcodone-acetaminophen (NORCO) 5-325 MG tablet  at PRN Self No Yes   Sig: Take 1-2 tablets by mouth every 6 hours as needed for severe pain   acetaminophen (TYLENOL) 650 MG CR tablet  at PRN Self Yes Yes   Sig: Take 650 mg by mouth every 8 hours as needed for mild pain or fever   betamethasone valerate (VALISONE) 0.1 % external lotion  at PRN Self No Yes   Sig: Apply topically 2 times daily Apply topically to scalp twice daily PRN   cholecalciferol 25 MCG (1000 UT) TABS 12/7/2019 at am Self Yes Yes   Sig: Take 1,000 Units by mouth daily   ezetimibe (ZETIA) 10 MG tablet 12/6/2019 at pm Self No Yes   Sig: Take 1 tablet (10 mg) by mouth daily   ferrous sulfate (FEROSUL) 325 (65 Fe) MG tablet 12/7/2019 at am Self Yes Yes   Sig: Take 325 mg by mouth daily (with breakfast)   fluocinonide (LIDEX) 0.05 % external ointment  at PRN Self Yes Yes   Sig: Apply topically 2 times daily as needed   furosemide (LASIX) 40 MG tablet 12/7/2019 at am Self No Yes   Sig: Take 0.5 tablets (20 mg) by mouth daily   glucosamine-chondroitin 500-400 MG CAPS per capsule 12/7/2019 at x1 Self Yes Yes   Sig: Take 1 capsule by mouth 2 times daily   mesalamine (ASACOL HD) 800 MG EC tablet 12/7/2019 at x1 Self No Yes   Sig: Take 1 tablet (800 mg) by mouth 2 times daily   methocarbamol (ROBAXIN) 750 MG tablet  at PRN Self No Yes   Sig: Take 1 tablet (750 mg) by mouth every 6 hours as needed for muscle spasms   rosuvastatin (CRESTOR) 40 MG tablet 12/6/2019 at pm Self No Yes   Sig: Take 1 tablet (40 mg) by mouth daily   senna-docusate (SENOKOT-S/PERICOLACE) 8.6-50 MG tablet  at PRN Self No Yes   Sig: Take 1-2 tablets by mouth 2 times daily as needed for constipation   tamsulosin (FLOMAX) 0.4 MG capsule 12/6/2019 at pm Self No Yes   Sig: TAKE 1 CAPSULE BY MOUTH EVERY DAY   warfarin ANTICOAGULANT (COUMADIN) 5 MG tablet 12/6/2019 at pm Self Yes Yes   Sig: Take 5 mg by mouth daily On Mondays, Wednesdays, and Fridays   warfarin ANTICOAGULANT (COUMADIN) 5  MG tablet 12/5/2019 Self Yes Yes   Sig: Take 7.5 mg by mouth daily On Sundays, Tuesdays, Thursdays, and Saturdays      Facility-Administered Medications: None     Allergies   Allergies   Allergen Reactions     Bees Anaphylaxis       Immunization History   Immunization History   Administered Date(s) Administered     Influenza (H1N1) 12/17/2009     Influenza (High Dose) 3 valent vaccine 09/16/2013, 10/13/2014, 10/05/2015, 09/30/2016, 09/07/2017, 10/15/2018, 10/25/2019     Influenza (IIV3) PF 10/13/2011, 10/23/2012     Pneumo Conj 13-V (2010&after) 10/05/2015     Pneumococcal 23 valent 08/10/2013     TD (ADULT, 7+) 05/07/2003     TDAP Vaccine (Adacel) 09/07/2017     Tdap (Adacel,Boostrix) 06/09/2013     Zoster vaccine, live 12/23/2008       Social History   I have reviewed this patient's social history and updated it with pertinent information if needed. Fausto Farr  reports that he quit smoking about 17 years ago. He started smoking about 57 years ago. He has a 40.00 pack-year smoking history. He has never used smokeless tobacco. He reports current alcohol use. He reports that he does not use drugs.    Family History   I have reviewed this patient's family history and updated it with pertinent information if needed.   Family History   Problem Relation Age of Onset     No Known Problems Mother      Coronary Artery Disease Father         CABG     Heart Disease Father         Pacemaker     No Known Problems Brother      No Known Problems Sister      No Known Problems Son      Other Cancer Daughter      No Known Problems Daughter      Heart Disease Brother      Other - See Comments Grandchild        Review of Systems   The 10 point Review of Systems is negative other than noted in the HPI or here.     Physical Exam   Temp: 97.9  F (36.6  C) Temp src: Oral BP: 107/65 Pulse: 76 Heart Rate: 68 Resp: 16 SpO2: 95 % O2 Device: None (Room air) Oxygen Delivery: 2 LPM  Vital Signs with Ranges  Temp:  [97.5  F (36.4  C)-99  F  (37.2  C)] 97.9  F (36.6  C)  Pulse:  [71-77] 76  Heart Rate:  [65-83] 68  Resp:  [16-27] 16  BP: (100-141)/(57-78) 107/65  SpO2:  [95 %-100 %] 95 %  200 lbs 6.4 oz  Body mass index is 32.84 kg/m .    GENERAL APPEARANCE:  alert and no distress  EYES: Eyes grossly normal to inspection, PERRL and conjunctivae and sclerae normal  HENT: ear canals and TM's normal and nose and mouth without ulcers or lesions  NECK: no adenopathy, no asymmetry, masses, or scars and thyroid normal to palpation  RESP: lungs clear to auscultation - no rales, rhonchi or wheezes  CV: regular rates and rhythm, normal S1 S2, no S3 or S4 and no murmur, click or rub  LYMPHATICS: normal ant/post cervical and supraclavicular nodes  ABDOMEN: soft, nontender, without hepatosplenomegaly or masses and bowel sounds normal  MS: extremities normal- no gross deformities noted  SKIN: no suspicious lesions or rashes      Data   Lab Results   Component Value Date    WBC 10.6 12/09/2019    HGB 10.7 (L) 12/09/2019    HCT 34.1 (L) 12/09/2019     12/09/2019     12/07/2019    POTASSIUM 3.6 12/07/2019    CHLORIDE 103 12/07/2019    CO2 30 12/07/2019    BUN 21 12/07/2019    CR 0.99 12/08/2019    GLC 92 12/07/2019    TROPONIN 2.73 (HH) 11/22/2005    TROPI 0.029 05/18/2018    AST 22 11/13/2019    ALT 18 11/13/2019    ALKPHOS 57 11/13/2019    BILITOTAL 0.3 11/13/2019    INR 1.72 (H) 12/09/2019     No results for input(s): CULT in the last 168 hours.  Recent Labs   Lab Test 08/29/19  1311 06/14/18  0904 05/22/18  0535 05/21/18  0530 05/20/18  0540 05/19/18  0530 05/18/18  0200 11/28/13  2201 11/28/13  1650   CULT Moderate growth  Finegoldia magna (Peptostreptococcus de)  *  Critical Value/Significant Value called to and read back by  IMANI VILLARREAL 8.31.19 1405. PAULETTE    Heavy growth  Finegoldia magna (Peptostreptococcus de)  *  Susceptibility testing done on previous specimen  Moderate growth  Staphylococcus aureus  *  Critical Value/Significant Value,  preliminary result only, called to and read back by  SUKHDEV JIMENEZ [facility]  8.30.19 1137. PAULETTE    Moderate growth  Staphylococcus aureus  Susceptibility testing done on previous specimen  * No MRSA isolated: susceptibilities not available. PCR assay is more sensitive than   culture.   No growth No growth No growth No growth No growth Duplicate request Charge credited  Duplicate request Charge credited No growth       Amount of time performed on this consult: 45 minutes. This includes face to face assessment and care coordination with the primary team.

## 2019-12-09 NOTE — PLAN OF CARE
Arrived to the floor from PACU at 1500.  RLE dressing C/D/I.  C/o right leg calf pain, Norco given X1.  VSS.  Patient refused Capno, put on continuous pulse ox, but there was not order for capno.  RLE elevated on 2 pillows, ice applied.  Heparin gtt 11mL/hr.  Hep 10A re-check at 11pm.  Coumadin given tonight.  Tele NSR with BBB.  Tolerated regular diet.  Mepilex covering coccyx that is red/blanchable with tiny scabs.

## 2019-12-09 NOTE — PLAN OF CARE
Pt A&O. VSS on 2L O2 overnight. Tele NSR w/ BBB. CMS intact and dressing to calf intact. Up w/ SBA and cane. Taking norco and robaxin for pain. Heparin drip infusing @ 11 ml/hr. Will continue to monitor.

## 2019-12-09 NOTE — PROGRESS NOTES
Plastic Surgery    Patient doing well. No concerns.    PE: Temp: 98  F (36.7  C) Temp src: Oral BP: 133/69 Pulse: 76 Heart Rate: 76 Resp: 16 SpO2: 95 % O2 Device: None (Room air) Oxygen Delivery: 2 LPM    Dressing in place.     A/P:  Continue bedrest to help improve LE edema. Plan activity as tolerated tomorrow. Wound check tomorrow. Discussed antibiotics with Dr. Floyd and will discontinue after 24 hours.    Sara Martinez MD  Plastic Surgery  [facility]  [phone] (office)    /

## 2019-12-09 NOTE — PLAN OF CARE
CMS intact.  Dressing CDI.  Skin graft site CDI/SHABBIR.  Pain managed with oxy and robaxin. Up SBA.  Continue to monitor.

## 2019-12-09 NOTE — PROGRESS NOTES
[facility]  Hospitalist Progress Note        Pito Cespedes MD   12/09/2019        Interval History:      - had debridement on 12/8, heparin and warfarin resumed         Assessment and Plan:        Fausto Farr is a 79 year old male with extensive medical history including BPH, HTN, Ulcerative colitis CAD s/p CABG, chronic venous insufficiency, diastolic heart failure, history of gastric ulcer, GAGE on CPAP, paroxysmal atrial fibrillation (s/p ablation (4/2019), prostate cancer status post radiation therapy, history of AAA repair , h/o mechanical aortic and mitral valve replacement, chronic anemia.    He was recently admitted from 11/10 to 11/19/19 for Chronic wound of right anterior knee following total knee arthroplasty (7/2019) with a visible tendon - s/p right gastrocnemius muscle transfer to right knee and split-thickness skin graft from right thigh to right knee on 11/12/2019. He is now admitted to plastic surgery for further management of non-healing wound on right knee.       # Non-healing wounds of Chronic right anterior knee and calf  # s/p recent gastroc + skin graft Nov 12, originally had TKA in July 2019  # s/p debridement of right calf and wound closure on 12/8/19  - Operative cares and ultimate plan per Plastic Surgery - Primary team  - empirically started on Vancomycin and zosyn; ID following; Vancomycin switched to Daptomycin per ID  - had debridement and wound closure on 12/8/19 by plastic surgery, surgery recommends continuing antibiotics for 24 hours after surgery and then discontinue  -can probably discontinue Zosyn and Daptomycin this evening, will defer to surgery and ID     # Atrial fibrillation and atrial flutter, status post ablation (4/2019).  # Bradycardia postoperatively related to chronic conduction disease with worsening due to med effect.  # Status post mechanical aortic and mitral valve replacements on chronic anticoagulation.  - during last hospital stay  "post op he was noted with intermittent second degree AVB and bradycardia to 30's-40's. PTA metoprolol stopped 11/17 (multiple doses had been held prior due to HR parameters). Was seen by EP and felt pt may have had some symptoms and PPM recommended but not immediately given recent surgery  - was discharge on 30d cardiac monitor from last hospital stay; to follow EP outpatient  - rate controlled off metoprolol  - coumadin held for surgery and started on IV Heparin drip (held for 4 hrs prior to surgery)  - warfarin resumed post op and continue Heparin until INR in therapeutic range, goal of 2.5-3.5  - INR post op 1.72    # Coronary artery disease, s/p CABG (along with AVR) in 2006, h/o redo sternotomy with mechanical AVR and MVR 2013 .  # Hypertension (benign essential)  # Dyslipidemia  - continue PTA Lasix; Metoprolol d/marina last admission due to bradycardia  - continue Zetia, Crestor     #Anemia, suspect chronic component.  - recent baseline Hb around 10  - No overt clinical signs of major bleeding.  - Hb 9.7--10.7; monitor Hb    # PAD.  - H/o AAA stent repair.     # Obesity, BMI of 32.22.  # Obstructive sleep apnea.  - Continue CPAP.     # Benign prostatic hypertrophy.  - Continue tamsulosin.     # Ulcerative colitis.  - Continue mesalamine.    # DVT Prophylaxis: resumed anticoagulation post surgery as above=    #Code status: Full code    Disposition: likely 1-2 days when INR therapeutic per primary                      Physical Exam:      Blood pressure 107/65, pulse 76, temperature 97.9  F (36.6  C), temperature source Oral, resp. rate 16, height 1.664 m (5' 5.5\"), weight 90.9 kg (200 lb 6.4 oz), SpO2 95 %.  Vitals:    12/07/19 1519 12/08/19 0503   Weight: 89.4 kg (197 lb) 90.9 kg (200 lb 6.4 oz)     Vital Signs with Ranges  Temp:  [97.5  F (36.4  C)-99  F (37.2  C)] 97.9  F (36.6  C)  Pulse:  [71-77] 76  Heart Rate:  [65-83] 68  Resp:  [16-27] 16  BP: (100-141)/(57-78) 107/65  SpO2:  [95 %-100 %] 95 %  I/O's Last " 24 hours  I/O last 3 completed shifts:  In: 1830 [P.O.:880; I.V.:950]  Out: 1505 [Urine:1500; Blood:5]    Constitutional: Alert, awake and oriented X 3; lying comfortably in bed in no apparent distress   HEENT: Pupils equal and reactive to light and accomodation, EOMI intact; neck supple no raised JVD or rigidity    Oral cavity: Moist mucosa   Cardiovascular: Normal s1 s2, irregularly regular rhythm, systolic click murmur +   Lungs: B/l clear to auscultation, no wheezes or crepitations   Abdomen: Soft, nt, nd, no guarding, rigidity or rebound; BS +   LE :  right LE in bandages; right thigh graft site looks ok   Musculoskeletal: Power 5/5 in all extremities   Neuro: No focal neurological deficits noted, CN II to XII grossly intact   Psychiatry: normal mood and affect                Medications:          DAPTOmycin (CUBICIN) intermittent infusion  6 mg/kg Intravenous Q24H     ezetimibe  10 mg Oral QPM     ferrous sulfate  325 mg Oral Daily with breakfast     furosemide  20 mg Oral Daily     mesalamine  800 mg Oral BID     piperacillin-tazobactam  4.5 g Intravenous Q6H     rosuvastatin  40 mg Oral QPM     sodium chloride (PF)  3 mL Intracatheter Q8H     tamsulosin  0.4 mg Oral QPM     warfarin ANTICOAGULANT  10 mg Oral ONCE at 18:00     PRN Meds: acetaminophen, HYDROcodone-acetaminophen, lidocaine 4%, lidocaine (buffered or not buffered), melatonin, methocarbamol, morphine, naloxone, ondansetron **OR** ondansetron, - MEDICATION INSTRUCTIONS -, polyethylene glycol, prochlorperazine **OR** prochlorperazine **OR** prochlorperazine, senna-docusate, sodium chloride (PF), Warfarin Therapy Reminder         Data:      All new lab and imaging data was reviewed.   Recent Labs   Lab Test 12/09/19  0752 12/08/19  0356 12/07/19 2025 12/07/19  1640   WBC 10.6  --  10.9 11.6*   HGB 10.7*  --  9.7* 10.8*   MCV 90  --  89 89     --  341 396   INR 1.72* 2.11*  --  2.22*      Recent Labs   Lab Test 12/08/19  0356 12/07/19  1640  11/15/19  0806 11/14/19  0753 11/13/19  0804   NA  --  136 137  --  135   POTASSIUM  --  3.6 3.9  --  4.4   CHLORIDE  --  103 102  --  103   CO2  --  30 30  --  27   BUN  --  21 14  --  14   CR 0.99 0.96 0.85  --  0.79   ANIONGAP  --  3 5  --  5   AWILDA  --  8.8 8.6  --  8.8   GLC  --  92 139* 104* 125*     Recent Labs   Lab Test 05/18/18  0550 05/18/18  0200 11/29/13  0010   TROPI 0.029 0.042 0.033

## 2019-12-10 DIAGNOSIS — Z53.9 DIAGNOSIS NOT YET DEFINED: Primary | ICD-10-CM

## 2019-12-10 LAB
ERYTHROCYTE [DISTWIDTH] IN BLOOD BY AUTOMATED COUNT: 13.9 % (ref 10–15)
GLUCOSE BLDC GLUCOMTR-MCNC: 92 MG/DL (ref 70–99)
HCT VFR BLD AUTO: 33.5 % (ref 40–53)
HGB BLD-MCNC: 10.6 G/DL (ref 13.3–17.7)
INR PPP: 2.61 (ref 0.86–1.14)
LMWH PPP CHRO-ACNC: 0.12 IU/ML
MCH RBC QN AUTO: 28.2 PG (ref 26.5–33)
MCHC RBC AUTO-ENTMCNC: 31.6 G/DL (ref 31.5–36.5)
MCV RBC AUTO: 89 FL (ref 78–100)
PLATELET # BLD AUTO: 381 10E9/L (ref 150–450)
RBC # BLD AUTO: 3.76 10E12/L (ref 4.4–5.9)
WBC # BLD AUTO: 9.8 10E9/L (ref 4–11)

## 2019-12-10 PROCEDURE — 00000146 ZZHCL STATISTIC GLUCOSE BY METER IP

## 2019-12-10 PROCEDURE — 25000132 ZZH RX MED GY IP 250 OP 250 PS 637: Mod: GY | Performed by: PLASTIC SURGERY

## 2019-12-10 PROCEDURE — 99232 SBSQ HOSP IP/OBS MODERATE 35: CPT | Performed by: HOSPITALIST

## 2019-12-10 PROCEDURE — 85610 PROTHROMBIN TIME: CPT | Performed by: PLASTIC SURGERY

## 2019-12-10 PROCEDURE — 85520 HEPARIN ASSAY: CPT | Performed by: PLASTIC SURGERY

## 2019-12-10 PROCEDURE — G0180 MD CERTIFICATION HHA PATIENT: HCPCS | Performed by: INTERNAL MEDICINE

## 2019-12-10 PROCEDURE — 12000000 ZZH R&B MED SURG/OB

## 2019-12-10 PROCEDURE — 85027 COMPLETE CBC AUTOMATED: CPT | Performed by: PLASTIC SURGERY

## 2019-12-10 PROCEDURE — 36415 COLL VENOUS BLD VENIPUNCTURE: CPT | Performed by: PLASTIC SURGERY

## 2019-12-10 PROCEDURE — 25000128 H RX IP 250 OP 636: Performed by: HOSPITALIST

## 2019-12-10 RX ORDER — WARFARIN SODIUM 2.5 MG/1
2.5 TABLET ORAL
Status: COMPLETED | OUTPATIENT
Start: 2019-12-10 | End: 2019-12-10

## 2019-12-10 RX ORDER — MINERAL OIL/HYDROPHIL PETROLAT
OINTMENT (GRAM) TOPICAL DAILY PRN
Status: DISCONTINUED | OUTPATIENT
Start: 2019-12-10 | End: 2019-12-11 | Stop reason: HOSPADM

## 2019-12-10 RX ADMIN — HYDROCODONE BITARTRATE AND ACETAMINOPHEN 1 TABLET: 5; 325 TABLET ORAL at 11:38

## 2019-12-10 RX ADMIN — METHOCARBAMOL TABLETS 750 MG: 750 TABLET, COATED ORAL at 04:33

## 2019-12-10 RX ADMIN — WARFARIN SODIUM 2.5 MG: 2.5 TABLET ORAL at 17:52

## 2019-12-10 RX ADMIN — ROSUVASTATIN CALCIUM 40 MG: 20 TABLET, FILM COATED ORAL at 20:28

## 2019-12-10 RX ADMIN — MESALAMINE 800 MG: 800 TABLET, DELAYED RELEASE ORAL at 20:28

## 2019-12-10 RX ADMIN — METHOCARBAMOL TABLETS 750 MG: 750 TABLET, COATED ORAL at 11:35

## 2019-12-10 RX ADMIN — EZETIMIBE 10 MG: 10 TABLET ORAL at 20:28

## 2019-12-10 RX ADMIN — PIPERACILLIN AND TAZOBACTAM 4.5 G: 4; .5 INJECTION, POWDER, FOR SOLUTION INTRAVENOUS at 01:00

## 2019-12-10 RX ADMIN — HYDROCODONE BITARTRATE AND ACETAMINOPHEN 1 TABLET: 5; 325 TABLET ORAL at 04:33

## 2019-12-10 RX ADMIN — PIPERACILLIN AND TAZOBACTAM 4.5 G: 4; .5 INJECTION, POWDER, FOR SOLUTION INTRAVENOUS at 06:40

## 2019-12-10 RX ADMIN — METHOCARBAMOL TABLETS 750 MG: 750 TABLET, COATED ORAL at 17:52

## 2019-12-10 RX ADMIN — MESALAMINE 800 MG: 800 TABLET, DELAYED RELEASE ORAL at 08:39

## 2019-12-10 RX ADMIN — FERROUS SULFATE TAB 325 MG (65 MG ELEMENTAL FE) 325 MG: 325 (65 FE) TAB at 08:39

## 2019-12-10 RX ADMIN — TAMSULOSIN HYDROCHLORIDE 0.4 MG: 0.4 CAPSULE ORAL at 20:28

## 2019-12-10 RX ADMIN — FUROSEMIDE 20 MG: 20 TABLET ORAL at 08:39

## 2019-12-10 RX ADMIN — HYDROCODONE BITARTRATE AND ACETAMINOPHEN 1 TABLET: 5; 325 TABLET ORAL at 17:52

## 2019-12-10 ASSESSMENT — ACTIVITIES OF DAILY LIVING (ADL)
ADLS_ACUITY_SCORE: 21

## 2019-12-10 ASSESSMENT — MIFFLIN-ST. JEOR: SCORE: 1564.81

## 2019-12-10 NOTE — PROGRESS NOTES
[facility]  Patient is currently open to home care services with [facility]. The patient is currently receiving Skilled Nursing services. [facility]  and team have been notified of patient admission. [facility] liaison will continue to follow patient during stay. If appropriate provide orders to resume home care at time of discharge.

## 2019-12-10 NOTE — PROGRESS NOTES
Plastic Surgery    Patient doing well. Anxious to discharge.    PE: Temp: 98.1  F (36.7  C) Temp src: Oral BP: 108/64 Pulse: 85 Heart Rate: 77 Resp: 16 SpO2: 95 % O2 Device: None (Room air)      Posterior calf incision intact, no hematoma/seroma. STSG donor site over right knee healing. Donor site healed.    INR 2.6    A/P:  Increase activity. Plan discharge tomorrow. Instruct family in dressing changes.    Sara Martinez MD  Plastic Surgery  [facility]  [phone] (office)

## 2019-12-10 NOTE — PLAN OF CARE
A&O. VSS. Tele: SR with BBB and PAC. CMS intact. Dressing CDI. PRN norco and robaxin. Reg diet. SBA. Voiding in urinal. Contact MRSA.

## 2019-12-10 NOTE — PROGRESS NOTES
[facility]  Hospitalist Progress Note        Pito Cespedes MD   12/10/2019        Interval History:      - no acute issues overnight; denies any active complaints; eager for discharge home; INR therapeutic         Assessment and Plan:        Fausto Farr is a 79 year old male with extensive medical history including BPH, HTN, Ulcerative colitis CAD s/p CABG, chronic venous insufficiency, diastolic heart failure, history of gastric ulcer, GAGE on CPAP, paroxysmal atrial fibrillation (s/p ablation (4/2019), prostate cancer status post radiation therapy, history of AAA repair , h/o mechanical aortic and mitral valve replacement, chronic anemia.    He was recently admitted from 11/10 to 11/19/19 for Chronic wound of right anterior knee following total knee arthroplasty (7/2019) with a visible tendon - s/p right gastrocnemius muscle transfer to right knee and split-thickness skin graft from right thigh to right knee on 11/12/2019. He is now admitted to plastic surgery for further management of non-healing wound on right knee.       # Non-healing wounds of Chronic right anterior knee and calf  # s/p recent gastroc + skin graft Nov 12, originally had TKA in July 2019  # s/p debridement of right calf and wound closure on 12/8/19  - Operative cares and ultimate plan per Plastic Surgery - Primary team  - empirically started on Vancomycin and zosyn; ID following; Vancomycin switched to Daptomycin per ID--  Abx d/marina 24-48 hrs post op per ID/surgery  - had debridement and wound closure on 12/8/19 by plastic surgery, surgery     # Atrial fibrillation and atrial flutter, status post ablation (4/2019).  # Bradycardia postoperatively related to chronic conduction disease with worsening due to med effect.  # Status post mechanical aortic and mitral valve replacements on chronic anticoagulation.  - during last hospital stay post op he was noted with intermittent second degree AVB and bradycardia to  "30's-40's. PTA metoprolol stopped 11/17 (multiple doses had been held prior due to HR parameters). Was seen by EP and felt pt may have had some symptoms and PPM recommended but not immediately given recent surgery  - was discharge on 30d cardiac monitor from last hospital stay; to follow EP outpatient  - rate controlled off metoprolol  - warfarin resumed post op; INR post op 1.72---2.61; will discontinue heparin drip; countinue coumadin    # Coronary artery disease, s/p CABG (along with AVR) in 2006, h/o redo sternotomy with mechanical AVR and MVR 2013 .  # Hypertension (benign essential)  # Dyslipidemia  - continue PTA Lasix; Metoprolol d/marina last admission due to bradycardia  - continue Zetia, Crestor     #Anemia, suspect chronic component.  - recent baseline Hb around 10  - No overt clinical signs of major bleeding.  - Hb 9.7--10.7---10.6    # PAD.  - H/o AAA stent repair.     # Obesity, BMI of 32.22.  # Obstructive sleep apnea.  - Continue CPAP.     # Benign prostatic hypertrophy.  - Continue tamsulosin.     # Ulcerative colitis.  - Continue mesalamine.    # DVT Prophylaxis: resumed anticoagulation post surgery as above    #Code status: Full code    Disposition: per primary plan for 12/11/19 ; discharge med rec completed from Hospitalist perspective                     Physical Exam:      Blood pressure 108/64, pulse 85, temperature 98.1  F (36.7  C), temperature source Oral, resp. rate 16, height 1.664 m (5' 5.5\"), weight 91 kg (200 lb 9.9 oz), SpO2 95 %.  Vitals:    12/07/19 1519 12/08/19 0503 12/10/19 0520   Weight: 89.4 kg (197 lb) 90.9 kg (200 lb 6.4 oz) 91 kg (200 lb 9.9 oz)     Vital Signs with Ranges  Temp:  [98.1  F (36.7  C)-98.8  F (37.1  C)] 98.1  F (36.7  C)  Pulse:  [60-85] 85  Heart Rate:  [60-93] 77  Resp:  [15-16] 16  BP: (100-121)/(60-64) 108/64  SpO2:  [93 %-96 %] 95 %  I/O's Last 24 hours  I/O last 3 completed shifts:  In: 240 [P.O.:240]  Out: 500 [Urine:500]    Constitutional: Alert, awake " and oriented X 3; lying comfortably in bed in no apparent distress   HEENT: Pupils equal and reactive to light and accomodation, EOMI intact; neck supple no raised JVD or rigidity    Oral cavity: Moist mucosa   Cardiovascular: Normal s1 s2, irregularly regular rhythm, systolic click murmur +   Lungs: B/l clear to auscultation, no wheezes or crepitations   Abdomen: Soft, nt, nd, no guarding, rigidity or rebound; BS +   LE :  right LE in bandages; right thigh graft site looks ok   Musculoskeletal: Power 5/5 in all extremities   Neuro: No focal neurological deficits noted, CN II to XII grossly intact   Psychiatry: normal mood and affect                Medications:          ezetimibe  10 mg Oral QPM     ferrous sulfate  325 mg Oral Daily with breakfast     furosemide  20 mg Oral Daily     mesalamine  800 mg Oral BID     rosuvastatin  40 mg Oral QPM     sodium chloride (PF)  3 mL Intracatheter Q8H     tamsulosin  0.4 mg Oral QPM     warfarin ANTICOAGULANT  2.5 mg Oral ONCE at 18:00     PRN Meds: acetaminophen, HYDROcodone-acetaminophen, lidocaine 4%, lidocaine (buffered or not buffered), melatonin, methocarbamol, morphine, naloxone, ondansetron **OR** ondansetron, - MEDICATION INSTRUCTIONS -, polyethylene glycol, prochlorperazine **OR** prochlorperazine **OR** prochlorperazine, senna-docusate, sodium chloride (PF), Warfarin Therapy Reminder         Data:      All new lab and imaging data was reviewed.   Recent Labs   Lab Test 12/10/19  0643 12/09/19  0752 12/08/19  0356 12/07/19 2025   WBC 9.8 10.6  --  10.9   HGB 10.6* 10.7*  --  9.7*   MCV 89 90  --  89    396  --  341   INR 2.61* 1.72* 2.11*  --       Recent Labs   Lab Test 12/08/19  0356 12/07/19  1640 11/15/19  0806 11/14/19  0753 11/13/19  0804   NA  --  136 137  --  135   POTASSIUM  --  3.6 3.9  --  4.4   CHLORIDE  --  103 102  --  103   CO2  --  30 30  --  27   BUN  --  21 14  --  14   CR 0.99 0.96 0.85  --  0.79   ANIONGAP  --  3 5  --  5   AWILDA  --  8.8  8.6  --  8.8   GLC  --  92 139* 104* 125*     Recent Labs   Lab Test 05/18/18  0550 05/18/18  0200 11/29/13  0010   TROPI 0.029 0.042 0.033

## 2019-12-11 VITALS
TEMPERATURE: 97.4 F | HEART RATE: 89 BPM | WEIGHT: 193.8 LBS | RESPIRATION RATE: 16 BRPM | SYSTOLIC BLOOD PRESSURE: 128 MMHG | OXYGEN SATURATION: 94 % | BODY MASS INDEX: 31.15 KG/M2 | DIASTOLIC BLOOD PRESSURE: 64 MMHG | HEIGHT: 66 IN

## 2019-12-11 LAB
INR PPP: 2.36 (ref 0.86–1.14)
LMWH PPP CHRO-ACNC: <0.1 IU/ML

## 2019-12-11 PROCEDURE — 85520 HEPARIN ASSAY: CPT | Performed by: PLASTIC SURGERY

## 2019-12-11 PROCEDURE — 85610 PROTHROMBIN TIME: CPT | Performed by: PLASTIC SURGERY

## 2019-12-11 PROCEDURE — 36415 COLL VENOUS BLD VENIPUNCTURE: CPT | Performed by: PLASTIC SURGERY

## 2019-12-11 PROCEDURE — 25000132 ZZH RX MED GY IP 250 OP 250 PS 637: Mod: GY | Performed by: PLASTIC SURGERY

## 2019-12-11 RX ADMIN — FERROUS SULFATE TAB 325 MG (65 MG ELEMENTAL FE) 325 MG: 325 (65 FE) TAB at 08:35

## 2019-12-11 RX ADMIN — METHOCARBAMOL TABLETS 750 MG: 750 TABLET, COATED ORAL at 08:35

## 2019-12-11 RX ADMIN — MESALAMINE 800 MG: 800 TABLET, DELAYED RELEASE ORAL at 08:34

## 2019-12-11 RX ADMIN — HYDROCODONE BITARTRATE AND ACETAMINOPHEN 1 TABLET: 5; 325 TABLET ORAL at 01:00

## 2019-12-11 RX ADMIN — FUROSEMIDE 20 MG: 20 TABLET ORAL at 08:34

## 2019-12-11 RX ADMIN — METHOCARBAMOL TABLETS 750 MG: 750 TABLET, COATED ORAL at 01:00

## 2019-12-11 RX ADMIN — HYDROCODONE BITARTRATE AND ACETAMINOPHEN 1 TABLET: 5; 325 TABLET ORAL at 08:36

## 2019-12-11 ASSESSMENT — ACTIVITIES OF DAILY LIVING (ADL)
ADLS_ACUITY_SCORE: 21

## 2019-12-11 ASSESSMENT — MIFFLIN-ST. JEOR: SCORE: 1533.88

## 2019-12-11 NOTE — PLAN OF CARE
Pt IND. Taking norco and robaxin for pain. Dressing CDI. Numbness present to R knee. Plan to discharge today.

## 2019-12-11 NOTE — PLAN OF CARE
Pt is A & O x 4. Lungs sound clear, bowel sounds active, cms intact except for numbness and edema in right leg. Up independently. Dressing was changed. Received Norco and robaxin for pain and spasm. discharge instructions reviewed. Was discharged home.

## 2019-12-11 NOTE — PROGRESS NOTES
[facility]  Met with patient to discuss plans to resume home care. Patient to be discharged home today and has agreed to have [facility] resume Skilled Nursing services. Patient care support center processing referral. Patient verbalized understanding that resumption of care visit is scheduled for Friday 12/13/19.  Patient has 24 hour phone number for [facility] for any questions or concerns.

## 2019-12-11 NOTE — PLAN OF CARE
Pt up independent in room. Taking norco and robaxin for pain. Dressing changed this afternoon. Planning to discharge home on Wednesday after dressing change.

## 2019-12-12 ENCOUNTER — TELEPHONE (OUTPATIENT)
Dept: PEDIATRICS | Facility: CLINIC | Age: 79
End: 2019-12-12

## 2019-12-12 ENCOUNTER — PATIENT OUTREACH (OUTPATIENT)
Dept: CARE COORDINATION | Facility: CLINIC | Age: 79
End: 2019-12-12

## 2019-12-12 NOTE — TELEPHONE ENCOUNTER
Please contact patient for In-patient follow up.  [phone] (home)     Visit date: 1/11/19  Diagnosis listed:Chief Complaint: Postprocedural Non-Healing Wound, Subsequent Encounter, Open Wound Of Right Knee, Subsequent Encounter  Number of visits in past 12 months:3

## 2019-12-12 NOTE — TELEPHONE ENCOUNTER
Erroneous. Patient was called this morning for follow up call and also has clinical care involved in plan of care.      Daxa Hoover RN Flex

## 2019-12-12 NOTE — TELEPHONE ENCOUNTER
"    Hospital/TCU/ED for chronic condition Discharge Protocol: Recent Surgery    \"Hi, my name is Daxa Hoover RN, a registered nurse, and I am calling from [facility].  I am calling to follow up and see how things are going for you after your recent emergency visit/hospital/TCU stay.\"    Tell me how you are doing now that you are home?\"     I'm doing ok. I was waiting for call from [facility]. Are they starting tomorrow? The doctor said that they would come out sooner.     Writer spoke to [facility] after call:  Spoke with [facility] office. Patient and staff having been working to set him up an initial visit tomorrow 12/13/19.  Patient is on schedule but a nurse is not assigned to him at this time. Advised home care team that patient is going to shower today and use the last of his supplies to wrap his leg after the shower and that he would also like to have his INR checked.     Discharge Instructions    \"Let's review your discharge instructions.  What is/are the follow-up recommendations?  Pt. Response:   Not really same thing about the last time I went in for this surgery.   Today I will take bandages off and take a shower.   Patient stated he has supplies at home to re-wrap his leg. His wife is efficient in helping wrap his leg and has no questions at this time.     Plan:  Resume coumadin tonight (night of surgery). Continue antibiotics for 24 hours after surgery then discontinue. Patient stated he followed these directions.     \"Has an appointment with your primary care provider been scheduled?\"   No (schedule appointment)  I'm going in to see surgeon next Thursday and I will shower and wife will wrap the leg everyday. Wife feels ok has been doing for 3 weeks.     \"When you see the provider, I would recommend that you bring your medications with you.\"    Medications    \"Tell me what changed about your medicines when you discharged?\"    Changes to chronic " "meds?    0-1    \"What questions do you have about your medications?\"    None     New diagnoses of heart failure, COPD, diabetes, or MI?    No          On warfarin: \"Were you given any recommendations for follow-up with the anticoagulation clinic?\" 5mg m-w-f and 7.5mg after. I was hoping Home Care will do INR and call in and then make changes.     Post Discharge Medication Reconciliation Status:   Went over medications with patient.   Patient no longer on the Senna or Tylenol. Asked to take of medication list at this time.   Patient stated he has had loose stools and not feeling constipated at all.     Was MTM referral placed (*Make sure to put transitions as reason for referral)? \"No. I feel confident right now. I have been doing this for 5-10 yrs.   The heart doctor will change some medications at times. The other doctors look to see what is on the list.\"     Taking Norco: \"pain is usually 3-8/10 after about 6-7 hrs i'm ready for a norco.\"   \"When I sit I elevated the leg on pillows and that applies pressure to the area.\"  Call Summary    \"What questions or concerns do you have about your recent visit and your follow-up care?\"     none    \"If you have questions or things don't continue to improve, we encourage you contact us through the main clinic number.  Even if the clinic is not open, triage nurses are available 24/7 to help you.     We would like you to know that our clinic has extended hours (provide information).  We also have urgent care (provide details on closest location and hours/contact info)\"      \"Thank you for your time and take care!\"    Daxa Hoover RN Flex        "

## 2019-12-12 NOTE — DISCHARGE SUMMARY
Discharge Summary    Fausto Farr MRN# [id number]   YOB: 1940 Age: 79 year old     Date of Admission:  12/7/2019  Date of Discharge:  12/11/2019 10:26 AM  Admitting Physician:  Sara Martinez MD  Discharge Physician:  Sara Martinez MD          Discharging Service:  Plastic and Reconstructive Surgery      HOSPITAL COURSE:  Fausto Farr was admitted to the hospital on 12/7/2019 due to dehiscence of his right posterior calf wound and bleeding.  He is on anticoagulation due to artificial cardiac valves.  On evaluation, the patient had dehiscence of the incision and the plan was to proceed to the operating room once his INR was at an acceptable level.      He was monitored by the hospital service as well as pharmacy.  On hospital day #2, his INR was close to 2 and plans were taken to proceed to the operating room for split-thickness skin graft and debridement of the devitalized tissue.  However intraoperatively, the devitalized tissue was removed and the wound was able to be reapproximated primarily.    Postoperatively, he was kept on bedrest for several days to minimize edema in his right leg.  During this course, he was restarted on his Coumadin and he was discharged on 12/11/2019.      No medications prior to admission.        PE:  All incisions were clean, dry and intact.  The posterior calf incision shows some ecchymosis versus ischemia centrally but not extensively.      Discharge Medication List as of 12/11/2019  9:55 AM      CONTINUE these medications which have NOT CHANGED    Details   acetaminophen (TYLENOL) 650 MG CR tablet Take 650 mg by mouth every 8 hours as needed for mild pain or fever, Historical      betamethasone valerate (VALISONE) 0.1 % external lotion Apply topically 2 times daily Apply topically to scalp twice daily PRNDisp-60 mL, R-3Local Print      cholecalciferol 25 MCG (1000 UT) TABS Take 1,000 Units by mouth daily, Historical      ezetimibe (ZETIA) 10  MG tablet Take 1 tablet (10 mg) by mouth daily, Disp-90 tablet, R-3, Local Print      ferrous sulfate (FEROSUL) 325 (65 Fe) MG tablet Take 325 mg by mouth daily (with breakfast), Historical      fluocinonide (LIDEX) 0.05 % external ointment Apply topically 2 times daily as neededHistorical      furosemide (LASIX) 40 MG tablet Take 0.5 tablets (20 mg) by mouth daily, Disp-45 tablet, R-3, E-PrescribeProfile Rx: patient will contact pharmacy when needed      glucosamine-chondroitin 500-400 MG CAPS per capsule Take 1 capsule by mouth 2 times daily, Historical      HYDROcodone-acetaminophen (NORCO) 5-325 MG tablet Take 1-2 tablets by mouth every 6 hours as needed for severe pain, Disp-30 tablet, R-0, E-Prescribe      mesalamine (ASACOL HD) 800 MG EC tablet Take 1 tablet (800 mg) by mouth 2 times daily, Disp-180 tablet, R-11, Local Print      methocarbamol (ROBAXIN) 750 MG tablet Take 1 tablet (750 mg) by mouth every 6 hours as needed for muscle spasms, Disp-30 tablet, R-0, E-Prescribe      rosuvastatin (CRESTOR) 40 MG tablet Take 1 tablet (40 mg) by mouth daily, Disp-90 tablet, R-3, Local Print      senna-docusate (SENOKOT-S/PERICOLACE) 8.6-50 MG tablet Take 1-2 tablets by mouth 2 times daily as needed for constipation, Disp-60 tablet, R-0, E-Prescribe      tamsulosin (FLOMAX) 0.4 MG capsule TAKE 1 CAPSULE BY MOUTH EVERY DAY, Disp-90 capsule, R-3, E-PrescribeProfile Rx: patient will contact pharmacy when needed      !! warfarin ANTICOAGULANT (COUMADIN) 5 MG tablet Take 5 mg by mouth daily On Mondays, Wednesdays, and Fridays, Historical      !! warfarin ANTICOAGULANT (COUMADIN) 5 MG tablet Take 7.5 mg by mouth daily On Sundays, Tuesdays, Thursdays, and Saturdays, Historical       !! - Potential duplicate medications found. Please discuss with provider.      STOP taking these medications       acetaminophen (TYLENOL) 325 MG tablet Comments:   Reason for Stopping:         polyethylene glycol (MIRALAX/GLYCOLAX) packet  Comments:   Reason for Stopping:                 PLAN:  The patient has been instructed to follow up with Dr. Martinez in 1 week.    Post care instruction provided.  He should apply Aquaphor, Adaptic and a light dressing to both the knee wound and the posterior calf wound.  This should be secured with an Ace wrap.  He has no limitations on activities and can weight-bear as tolerated.  He can shower and 12/12/2019 with all areas uncovered.    Sara Martinez MD  Plastic Surgery  [facility]  [phone]

## 2019-12-12 NOTE — TELEPHONE ENCOUNTER
Please contact patient for In-patient follow up.  [phone] (home)     Visit date: 121119  Diagnosis listed: Postprocedural Non-Healing Wound, Subsequent Encounter, Open Wound Of Right Knee, Subsequent Encounter  Number of visits in past 12 months: 0 ED / 3 IP

## 2019-12-12 NOTE — PROGRESS NOTES
Clinic Care Coordination Contact  Care Coordination Communication    Clinical Data: Patient was admitted to [facility] on 12/7/2019 due to dehiscence of his right posterior calf wound and bleeding.  Plastics took patient to OR, devitalized tissue was removed and the wound was able to be reapproximated primarily.  Patient discharged home with plans to resume home care services.    Home Care Contact:              Home Care Agency: [facility] RN               Contact name () and phone number: JUSTIN Huizar [phone]              Care Coordination contacted home care: Yes.  Contact info has been added to non-clinical notes.              Anticipated start of care date: 12/13/19    Patient Contact:               Did not contact this encounter.  Clinic triage RN had contacted patient today and addressed any concerns.    Plan: RN/SW Care Coordinator will await notification from home care staff informing RN/SW Care Coordinator of patients discharge plans/needs.       Nemo Crow RN  Care Coordination  Phone:  [phone]  Email: [email or web address]  [facility] [place], [place] and [facility]

## 2019-12-12 NOTE — OP NOTE
PLASTIC SURGERY OPERATIVE NOTE  Patient Name:  Fausto Farr     Date of Service:  12/7/2019 - 12/8/2019     PREOPERATIVE DIAGNOSES:   1.  Open wound, lower leg [S81.809A]     POSTOPERATIVE DIAGNOSES:   1.  Open wound, lower leg [S81.801E]       SURGEON:  Sara Martinez MD   OR STAFF:  Circulator: Bryan Scott RN  Scrub Person: Garrett Lozano     ANESTHESIA:  General     ESTIMATED BLOOD LOSS:  5 mL    SPECIMEN(S):  * No specimens in log *     PROCEDURES:   1.  Complex closure right posterior calf, 24 cm      DESCRIPTION OF PROCEDURE:  Patient was marked for incisions and taken to the operating room.  General anesthesia was administered.  Patient was placed in the prone position and the right posterior calf was prepped and draped in a sterile manner.  The devitalized skin was removed.  Hemostasis was achieved.  The skin and subcutaneous tissue medial lateral to the devitalized tissue was elevated and then advanced to the midline.  There is some lateral for primary closure although under tension.  The incision was reapproximated with interrupted 2-0 and  3-0 nylon suture.  Xeroform and a light dressing were applied.    IMPLANTS:  * No implants in log *    FINDINGS: Incision able to be primarily reapproximated with no need for skin graft.    Sara Martinez MD  Plastic Surgery  [facility]  [phone] (office)

## 2019-12-13 ENCOUNTER — DOCUMENTATION ONLY (OUTPATIENT)
Dept: CARE COORDINATION | Facility: CLINIC | Age: 79
End: 2019-12-13

## 2019-12-13 ENCOUNTER — TELEPHONE (OUTPATIENT)
Dept: PEDIATRICS | Facility: CLINIC | Age: 79
End: 2019-12-13

## 2019-12-13 DIAGNOSIS — Z95.2 S/P MITRAL VALVE REPLACEMENT: ICD-10-CM

## 2019-12-13 DIAGNOSIS — Z79.01 LONG TERM CURRENT USE OF ANTICOAGULANT THERAPY: ICD-10-CM

## 2019-12-13 DIAGNOSIS — I48.91 AF (ATRIAL FIBRILLATION) (H): ICD-10-CM

## 2019-12-13 LAB — INR PPP: 2.1 (ref 0.8–1.1)

## 2019-12-13 NOTE — PROGRESS NOTES
..[facility] utilizes an encounter to take the place of a direct phone call to your office. Please take a moment to review the below request. Please reply or route message to author of this encounter.  Message will act as a verbal OK of orders requested below. Thank you.    Requesting ongoing skilled nursing visits 2x/week for 2, then 1x/week for 3 and 3 PRN.    Thank you,    Flaquita Zambrano, RN  [phone]

## 2019-12-13 NOTE — TELEPHONE ENCOUNTER
ANTICOAGULATION MANAGEMENT     Patient Name:  Fausto Farr  Date:  2019    ASSESSMENT /SUBJECTIVE:      Today's INR result of 2.1 is subtherapeutic. Goal INR of 2.5-3.5      Warfarin dose taken: Less warfarin taken than instructed which may be affecting INR, patient was discharged from hospital on 19 and reports they only gave him 2.5 mg instead of dose of 5mg    Diet: No new diet changes affecting INR    Medication changes/ interactions: No new medications/supplements affecting INR    Previous INR: Subtherapeutic     S/S of bleeding or thromboembolism: No    New injury or illness:  Yes:  hospitalization: dehiscence of his right posterior calf wound and bleeding    pcoming surgery, procedure or cardioversion:  No    Additional findings: None      PLAN:    Spoke with Home Care regarding INR result and instructed:     Warfarin Dosing Instructions: 7.5 mg tonight then continue same dosing of 5 mg on mon/wed/fri and 7.5 mg all other days    Instructed patient to follow up no later than: 19    Education provided: Yes: significance of INR      Home care nurse verbalizes understanding and agrees to warfarin dosing plan.    Instructed to call the Anticoagulation Clinic for any changes, questions or concerns. (#[phone])        OBJECTIVE:  INR   Date Value Ref Range Status   2019 2.1 (A) 0.8 - 1.1 Final             Anticoagulation Summary  As of 2019    INR goal:   2.5-3.5   TTR:   72.8 % (11.2 mo)   INR used for dosin.1! (2019)   Warfarin maintenance plan:   5 mg (5 mg x 1) every Mon, Wed, Fri; 7.5 mg (5 mg x 1.5) all other days   Full warfarin instructions:   : 7.5 mg; Otherwise 5 mg every Mon, Wed, Fri; 7.5 mg all other days   Weekly warfarin total:   45 mg   Plan last modified:   Diane Delcid RN (2019)   Next INR check:   2019   Priority:   High   Target end date:   Indefinite    Indications    AF (atrial fibrillation) (H) [I48.91]  S/P  mitral valve replacement [Z95.2]  Long term current use of anticoagulant therapy [Z79.01]             Anticoagulation Episode Summary     INR check location:       Preferred lab:       Send INR reminders to:   SHANNA DOWELL    Comments:   5mg tabs - andrade dose // transfer from [facility] // [facility] // CALENDAR // right knee replaced 7/22/19      Anticoagulation Care Providers     Provider Role Specialty Phone number    Jodi Flower Mai, MD  Internal Medicine [phone]

## 2019-12-18 ENCOUNTER — TELEPHONE (OUTPATIENT)
Dept: PEDIATRICS | Facility: CLINIC | Age: 79
End: 2019-12-18

## 2019-12-18 DIAGNOSIS — Z79.01 LONG TERM CURRENT USE OF ANTICOAGULANT THERAPY: ICD-10-CM

## 2019-12-18 DIAGNOSIS — Z95.2 S/P MITRAL VALVE REPLACEMENT: ICD-10-CM

## 2019-12-18 DIAGNOSIS — I48.91 AF (ATRIAL FIBRILLATION) (H): ICD-10-CM

## 2019-12-18 LAB — INR PPP: 3.1 (ref 0.9–1.1)

## 2019-12-18 NOTE — TELEPHONE ENCOUNTER
ANTICOAGULATION MANAGEMENT     Patient Name:  Fausto Farr  Date:  12/18/2019    ASSESSMENT /SUBJECTIVE:      Today's INR result of 3.1 is therapeutic. Goal INR of 2.5-3.5      Warfarin dose taken: Warfarin taken as previously instructed patient is back on track with correct warfarin dose    Diet: No new diet changes affecting INR    Medication changes/ interactions: No new medications/supplements affecting INR    Previous INR: Subtherapeutic     S/S of bleeding or thromboembolism: No    New injury or illness:  None since 12/7/19    Upcoming surgery, procedure or cardioversion:  No    Additional findings: none      PLAN:    Spoke with JUSTIN Juan regarding INR result and instructed:     Warfarin Dosing Instructions: Continue your current warfarin dose    Instructed patient to follow up no later than: 12/27/19    Education provided: Mariana Juan RN verbalizes understanding and agrees to warfarin dosing plan.    Instructed to call the Anticoagulation Clinic for any changes, questions or concerns. (#[phone])        OBJECTIVE:  INR   Date Value Ref Range Status   12/18/2019 3.1 (A) 0.90 - 1.10 Final             Anticoagulation Summary  As of 12/18/2019    INR goal:   2.5-3.5   TTR:   72.7 % (11.2 mo)   INR used for dosing:   3.1 (12/18/2019)   Warfarin maintenance plan:   5 mg (5 mg x 1) every Mon, Wed, Fri; 7.5 mg (5 mg x 1.5) all other days   Full warfarin instructions:   5 mg every Mon, Wed, Fri; 7.5 mg all other days   Weekly warfarin total:   45 mg   Plan last modified:   Diane Delcid RN (11/26/2019)   Next INR check:      Priority:   High   Target end date:   Indefinite    Indications    AF (atrial fibrillation) (H) [I48.91]  S/P mitral valve replacement [Z95.2]  Long term current use of anticoagulant therapy [Z79.01]             Anticoagulation Episode Summary     INR check location:       Preferred lab:       Send INR reminders to:   SHANNA DOWELL    Comments:   5mg tabs - andrade dose //  transfer from [facility] // [facility] // CALENDAR // right knee replaced 7/22/19      Anticoagulation Care Providers     Provider Role Specialty Phone number    Jodi Flower Mai, MD  Internal Medicine [phone]

## 2019-12-19 DIAGNOSIS — I48.91 AF (ATRIAL FIBRILLATION) (H): ICD-10-CM

## 2019-12-19 DIAGNOSIS — Z79.01 LONG TERM CURRENT USE OF ANTICOAGULANT THERAPY: Primary | ICD-10-CM

## 2019-12-20 RX ORDER — WARFARIN SODIUM 5 MG/1
5 TABLET ORAL DAILY
Qty: 135 TABLET | Refills: 0 | Status: ON HOLD | OUTPATIENT
Start: 2019-12-20 | End: 2020-02-10

## 2019-12-20 NOTE — TELEPHONE ENCOUNTER
"    Last Written Prescription Date:    Last Fill Quantity: ,  # refills:    Last office visit: 11/26/2019 with prescribing provider:     Future Office Visit:    Requested Prescriptions   Pending Prescriptions Disp Refills     warfarin ANTICOAGULANT (COUMADIN) 5 MG tablet       Sig: Take 1 tablet (5 mg) by mouth daily On Mondays, Wednesdays, and Fridays       Vitamin K Antagonists Failed - 12/19/2019  2:32 PM        Failed - INR is within goal in the past 6 weeks     Confirm INR is within goal in the past 6 weeks.     Recent Labs   Lab Test 12/18/19   INR 3.1*                       Passed - Recent (12 mo) or future (30 days) visit within the authorizing provider's specialty     Patient has had an office visit with the authorizing provider or a provider within the authorizing providers department within the previous 12 mos or has a future within next 30 days. See \"Patient Info\" tab in inbasket, or \"Choose Columns\" in Meds & Orders section of the refill encounter.              Passed - Medication is active on med list        Passed - Patient is 18 years of age or older          "

## 2019-12-26 ENCOUNTER — TELEPHONE (OUTPATIENT)
Dept: CARDIOLOGY | Facility: CLINIC | Age: 79
End: 2019-12-26

## 2019-12-26 ENCOUNTER — OFFICE VISIT (OUTPATIENT)
Dept: CARDIOLOGY | Facility: CLINIC | Age: 79
End: 2019-12-26
Attending: INTERNAL MEDICINE
Payer: MEDICARE

## 2019-12-26 VITALS
HEART RATE: 57 BPM | HEIGHT: 65 IN | SYSTOLIC BLOOD PRESSURE: 119 MMHG | WEIGHT: 201.4 LBS | BODY MASS INDEX: 33.55 KG/M2 | DIASTOLIC BLOOD PRESSURE: 70 MMHG

## 2019-12-26 DIAGNOSIS — I44.1 AV BLOCK, MOBITZ 2: ICD-10-CM

## 2019-12-26 DIAGNOSIS — I38 VALVULAR HEART DISEASE: Primary | ICD-10-CM

## 2019-12-26 PROCEDURE — 99214 OFFICE O/P EST MOD 30 MIN: CPT | Performed by: NURSE PRACTITIONER

## 2019-12-26 ASSESSMENT — MIFFLIN-ST. JEOR: SCORE: 1560.42

## 2019-12-26 NOTE — LETTER
12/26/2019    Chris Flower MD  [address] [place] [address]    RE: Fausto Farr       Dear Colleague,    I had the pleasure of seeing Fausto Farr in the [facility].    HPI:  Fausto Farr is a 79 year old male who presents to review device information.   He is a patient of Dr. Santo, has seen Dr. Vasquez (EP) and Dr. Wyatt and Dr. Whitman (vascular clinic). His medical history includes     1. Valvular heart disease.  AVR in 2006 with subsequent perivalvular leak and redo mechanical AVR and concomitant mechanical MVR in 2013.   2. Coronary artery disease with CABG (LIMA to LAD and radial graft to RCA) in 2006.    3. Chronic diastolic heart failure.  LVEF is 55%-60%.   4.  Previous endovascular AAA repair.  In 2010 by Dr. Mays from V[facility] Surgery as well as IR.   Has   5. AV conduction disease with bifascicular block and prolonged GA.   6. Atrial flutter s/p CTI ablation (4/2019)  7. Obstructive sleep apnea with use of CPAP.   8. Hypertension.   9. Dyslipidemia.   10. Chronic back pain.   11. Mild internal carotid artery disease.  Severe stenosis of left external carotid artery.'  12. Typical atrial flutter, s/p  typical CTI atrial flutter ablation (4/2019)  13. Varicose veins with venous insufficiency.  Treatment with VenaSeal closure, sclerotherapy and phlebectomy by Dr. Whitman in 01/2019.       Diagnostics:    ECHO (2018) revealed EF 55%.  right ventricle is mildly dilated. Mildly decreased right ventricular systolic function,  Mechanical mitral valve. 27mm St Solo. Both leaflets open well, mean gradient 4-5mmHg (normal). Physiologic MR. No vegetations, There is a mechanical aortic valve. 21mm St Solo. Views are limited of the valve leaflets. Mean gradient 11mmHg. No AI. Probably no vegetations    Nuclear stress test (4/2019) revealed fixed inferior septal defect and a second fixed apical defect. There is no significant reversibility on this study to  suggest ischemia.  EF 59%.     In November 2018, patient was hospitalized for nonhealing left lower leg wound for debridement and skin grafting.  Postoperatively he was found to be bradycardic with second-degree AV block and often 2-1 AV conduction.  At that time patient reported intermittent episodes of lightheadedness lasting no longer than 10 seconds.  Pedro Jeffries did not recommend proceeding with a pacemaker until his leg is fully healed    On 12/11, he was re-admitted to the hospital for dehiscence of his right posterior calf wound.       Today he presents after wearing a monitor for 30 days.  It revealed episodes of atrial fibrillation with RVR and episodes of and NSVT, no bradycardia, no pauses.    He states his right calf remains open and is packed daily.  He is scheduled to see Dr. Martinez at [facility] on 1/3/2019.  He continues to have lightheadedness when going up the stairs and intermittently when standing up.   At this time he denies chest pain or pressure, syncope, angina, dyspnea at rest or with exertion, palpitations.  He does not have Heart failure symptoms and denies orthopnea, PND, and abdominal edema. 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     This occurred at night while on metoprolol tartrate 25 mg twice daily     30 day monitor revealed episodes of atrial fibrillation with rates between  bpm, intermittent NSVT, there were no episodes of bradycardia or pauses.      Will reach out to Dr Vasquez to review the monitor results as patient continues to have a open wound and his monitor revealed no bradycardia and no pauses he may not need a ppm at this time.      Will request Dr. Martinez's notes regarding wound status.      Paroxsymal Atrial fibrillation    On 11/2019, pt's metoprolol tartrate was discontinued due to second degree AV  block, likely mobitz II    He continues to have episodes of atrial fibrillation with rates  bpm while on no AV node blockers    For anticoagulation for CHADS VASC 4 (CAD, Age++, HTN) he is taking warfarin with goal INR 2.5-3.5 due mechanical MVR.      Coronary artery disease     CABG (LIMA to LAD and radial graft to RCA) in 2006.      Asymptomatic    No AV node blockers due to AV block see above    No aspirin due to warfarin use    Currently taking Crestor 40 mg daily and Zetia 10 mg daily    Right leg wound    Per patient his right calf remains open with daily packing.  He will see his surgeon on 1/3     Patient expresses understanding and agreement with the plan.     I appreciate the chance to help with Fausto Farr Please let me know if you have any questions or concerns.    ADELE Putnam, CNP    This note was completed in part using Dragon voice recognition software. Although reviewed after completion, some word and grammatical errors may occur.    Orders Placed This Encounter   Procedures     Follow-Up with Cardiologist     No orders of the defined types were placed in this encounter.    There are no discontinued medications.      Encounter Diagnoses   Name Primary?     AV block, Mobitz 2      Valvular heart disease Yes       CURRENT MEDICATIONS:  Current Outpatient Medications   Medication Sig Dispense Refill     acetaminophen (TYLENOL) 650 MG CR tablet Take 650 mg by mouth every 8 hours as needed for mild pain or fever       betamethasone valerate (VALISONE) 0.1 % external lotion Apply topically 2 times daily Apply topically to scalp twice daily PRN 60 mL 3     cholecalciferol 25 MCG (1000 UT) TABS Take 1,000 Units by mouth daily       ezetimibe (ZETIA) 10 MG tablet Take 1 tablet (10 mg) by mouth daily 90 tablet 3     ferrous sulfate (FEROSUL) 325 (65 Fe) MG tablet Take 325 mg by mouth daily (with breakfast)       fluocinonide (LIDEX) 0.05 % external ointment Apply topically 2 times daily as  needed       furosemide (LASIX) 40 MG tablet Take 0.5 tablets (20 mg) by mouth daily 45 tablet 3     glucosamine-chondroitin 500-400 MG CAPS per capsule Take 1 capsule by mouth 2 times daily       HYDROcodone-acetaminophen (NORCO) 5-325 MG tablet Take 1-2 tablets by mouth every 6 hours as needed for severe pain 30 tablet 0     mesalamine (ASACOL HD) 800 MG EC tablet Take 1 tablet (800 mg) by mouth 2 times daily 180 tablet 11     methocarbamol (ROBAXIN) 750 MG tablet Take 1 tablet (750 mg) by mouth every 6 hours as needed for muscle spasms 30 tablet 0     rosuvastatin (CRESTOR) 40 MG tablet Take 1 tablet (40 mg) by mouth daily 90 tablet 3     tamsulosin (FLOMAX) 0.4 MG capsule TAKE 1 CAPSULE BY MOUTH EVERY DAY 90 capsule 3     warfarin ANTICOAGULANT (COUMADIN) 5 MG tablet Take 1 tablet (5 mg) by mouth daily 5 mg  every Mon, Wed, Fri; 7.5 mg (5 mg x 1.5) all other days or as instructed by INR nurse 135 tablet 0     senna-docusate (SENOKOT-S/PERICOLACE) 8.6-50 MG tablet Take 1-2 tablets by mouth 2 times daily as needed for constipation (Patient not taking: Reported on 12/26/2019) 60 tablet 0     warfarin ANTICOAGULANT (COUMADIN) 5 MG tablet Take 7.5 mg by mouth daily On Sundays, Tuesdays, Thursdays, and Saturdays         ALLERGIES     Allergies   Allergen Reactions     Bees Anaphylaxis       PAST MEDICAL HISTORY:  Past Medical History:   Diagnosis Date     Abdominal pain      Abnormal ECG     RBBB, 1st degree AVB, Left axis deviation     Anemia     currently taking iron     Arrhythmia     pac, pvc     Back pain     since 1980     BPH (benign prostatic hyperplasia)      Bruit      CAD (coronary artery disease)      Cellulitis 10/18/12     Cellulitis 05/2018    GrpB strep LLE cellulitis  negative RACHAEL for veg     Chronic venous insufficiency     bilat lower extremities     Contact dermatitis and other eczema, due to unspecified cause      Diaphragmatic hernia without mention of obstruction or gangrene      Diastolic HF  (heart failure) (H)      Gastric ulcer      Glucose intolerance (impaired glucose tolerance)      Heart murmur 9/16/13    valvular heart disease     Hyperlipidemia LDL goal <100 8/6/2013     Hypertension 8/6/13     Lumbago      Malaise and fatigue      Metabolic syndrome      Mobitz (type) I (Wenckebach's) atrioventricular block     and RBBB     Nocturia 10/18/12     Nonallopathic lesion of cervical region      Nonallopathic lesion of lumbar region      Nonallopathic lesion of pelvic region, not elsewhere classified      Nonallopathic lesion of rib cage      Nonallopathic lesion of sacral region      GAGE (obstructive sleep apnea)     CPAP     Paroxysmal atrial fibrillation (H) 10/18/12     Prostate cancer (H) 2008    radiation seed, XRT      PVD (peripheral vascular disease) (H)      RBBB     1st degree AVB, RBBB, LAHB     Rotator cuff strain     and sprain     S/P AAA repair      S/P aortic valve replacement 2006    developed perivalve leak and MS, therefore redo surg 2013     S/P CABG (coronary artery bypass graft) 2006    Lima-Lad, RA-Rca     Sciatica of left side     since 2000     Sepsis due to group B Streptococcus (H) 5/19/2018     Ulcerative colitis (H)      Varicose veins of bilateral lower extremities with other complications     s/p RLE vein stripping     Vitamin D deficiency        PAST SURGICAL HISTORY:  Past Surgical History:   Procedure Laterality Date     AORTIC VALVE REPLACEMENT  1/3/06    redo AVR SJM 21mm and SJM MVR 27mm in 2013SJM 21([id number] [id number]):AVR, SJM 27 :MVR-     APPLY WOUND VAC N/A 11/12/2019    Procedure: APPLICATION, WOUND VAC;  Surgeon: Sara Martinez MD;  Location: [facility] OR     ARTHROPLASTY KNEE      right knee     ARTHROPLASTY KNEE Right 7/22/2019    Procedure: Right total knee arthroplasty;  Surgeon: Prasanth Jensen MD;  Location: [facility] OR     BACK SURGERY  Oct 2015    Fusion L4-5, laminectomy L2, L3     BYPASS GRAFT ARTERY CORONARY  10/2013    reimplantation of radial  artery graft to RCA     C CABG, VEIN, TWO  1/3/06    Left radial to RCA, LIMA to LAD (RA to RCA reimplanted at time of 2013 surg)     CARDIAC CATHERIZATION  11/2005    Stent placed to RCA     CARDIAC CATHERIZATION  04/2013    Occluded RCA, patent LIMA to LAD and radial graft to PDA     CARPAL TUNNEL RELEASE RT/LT  1994     COLONOSCOPY  8-22-11     CYSTOSCOPY FLEXIBLE  10/16/2013    Procedure: CYSTOSCOPY FLEXIBLE;  FLEXIBLE CYSTOSCOPY / DILATION OF URETHRA / INSERTION OF LESLIE;  Surgeon: Cooper Wallace MD;  Location:  OR     ENDOVASCULAR REPAIR, INFRARENAL ABDOMINAL AORTIC ANEURYSM/DISSECTION; MODULAR BIFURCATED PROSTHESIS  2006    AAA repair endovascular     ENT SURGERY       EP ABLATION ATRIAL FLUTTER N/A 4/22/2019    Procedure: EP Ablation Atrial Flutter;  Surgeon: Jessy Vasquez MD;  Location:  [facility]     GENITOURINARY SURGERY  6/16/08    radioactive seeding     GRAFT FLAP PEDICLE EXTREMITY (LOCATION) Right 11/12/2019    Procedure: SURGICAL PROCUREMENT, FLAP, PEDICLE, EXTREMITY;  Surgeon: Sara Martinez MD;  Location:  OR     GRAFT SKIN SPLIT THICKNESS FROM EXTREMITY Right 11/12/2019    Procedure: RIGHT GASTRONEMIUS FLAP TO RIGHT KNEE, SPLIT THICKNESS SKIN GRAFT FROM RIGHT THIGH TO RIGHT KNEE, SURGICAL PROCUREMENT, FLAP, PEDICLE, EXTREMITY, WOUND VAC PLACEMENT;  Surgeon: Sara Martinez MD;  Location:  OR     HEAD & NECK SURGERY  1997    vocal cord polypectomy     INCISION AND DRAINAGE KNEE, COMBINED Right 8/29/2019    Procedure: INCISION AND DRAINAGE, KNEE W/ Secondary Wound Closure;  Surgeon: Prasanth Jensen MD;  Location:  OR     IRRIGATION AND DEBRIDEMENT LOWER EXTREMITY, COMBINED Right 12/8/2019    Procedure: DEBRIDEMENT OF RIGHT CALF AND WOUND CLOSURE.;  Surgeon: Sara Martinez MD;  Location:  OR     KNEE SURGERY  2001 Right knee arthroscopy     OPTICAL TRACKING SYSTEM FUSION SPINE POSTERIOR LUMBAR THREE+ LEVELS N/A 10/29/2015     Procedure: OPTICAL TRACKING SYSTEM FUSION SPINE POSTERIOR LUMBAR THREE+ LEVELS;  Surgeon: Walt Garcia MD;  Location: [facility] OR     PROSTATE SURGERY      radioactive seeding 08     REPAIR ANEURYSM ABDOMINAL AORTA       REPAIR VALVE MITRAL  10/16/2013    SJM 21(AGFN 756):AVR, SJM 27  501:MVRProcedure: REPAIR VALVE MITRAL;  REDO STERNOTOMY/REDO AORTIC VALVE REPLACEMENT/ MITRAL VALVE REPLACEMENT/REIMPLANTATION OF RIGHT CORONARY ARTERY BYPASS WITH RACHAEL ( ON PUMP);  Surgeon: Viet Singh MD;  Location:  OR     REPLACE VALVE AORTIC  10/16/2013    Procedure: REPLACE VALVE AORTIC;;  Surgeon: Viet Singh MD;  Location: [facility] OR     SURGERY GENERAL IP CONSULT  2008 Excision aneurysm abdominal aorta     SURGERY GENERAL IP CONSULT   Vocal cord polypectomy     VASCULAR SURGERY  1993     varicose vein stripping       FAMILY HISTORY:  Family History   Problem Relation Age of Onset     No Known Problems Mother      Coronary Artery Disease Father         CABG     Heart Disease Father         Pacemaker     No Known Problems Brother      No Known Problems Sister      No Known Problems Son      Other Cancer Daughter      No Known Problems Daughter      Heart Disease Brother      Other - See Comments Grandchild        SOCIAL HISTORY:  Social History     Socioeconomic History     Marital status:      Spouse name: None     Number of children: None     Years of education: None     Highest education level: None   Occupational History     None   Social Needs     Financial resource strain: None     Food insecurity:     Worry: None     Inability: None     Transportation needs:     Medical: None     Non-medical: None   Tobacco Use     Smoking status: Former Smoker     Packs/day: 1.00     Years: 40.00     Pack years: 40.00     Start date: 4/15/1962     Last attempt to quit: 10/23/2002     Years since quittin.1     Smokeless tobacco: Never Used   Substance and Sexual Activity     Alcohol  use: Yes     Frequency: 2-4 times a month     Drinks per session: 1 or 2     Comment: a couple beers per week (socially)     Drug use: No     Sexual activity: Never   Lifestyle     Physical activity:     Days per week: None     Minutes per session: None     Stress: None   Relationships     Social connections:     Talks on phone: None     Gets together: None     Attends Synagogue service: None     Active member of club or organization: None     Attends meetings of clubs or organizations: None     Relationship status: None     Intimate partner violence:     Fear of current or ex partner: None     Emotionally abused: None     Physically abused: None     Forced sexual activity: None   Other Topics Concern     Parent/sibling w/ CABG, MI or angioplasty before 65F 55M? Yes     Comment: Brother had bypass at 55      Service Not Asked     Blood Transfusions Not Asked     Caffeine Concern No     Comment: 6-8 cups of half and half per day     Occupational Exposure Not Asked     Hobby Hazards Not Asked     Sleep Concern Not Asked     Stress Concern Not Asked     Weight Concern Not Asked     Special Diet No     Back Care Not Asked     Exercise No     Bike Helmet Not Asked     Seat Belt Not Asked     Self-Exams Not Asked   Social History Narrative     None       Review of Systems:  Skin:  Negative rash   Eyes:  Positive for glasses  ENT:  Positive for hearing loss  Respiratory:  Positive for dyspnea on exertion;sleep apnea;CPAP  Cardiovascular:  Negative;palpitations;chest pain;syncope or near-syncope;cyanosis;fatigue;lightheadedness;dizziness;exercise intolerance Positive for;lightheadedness;dizziness;fatigue  Gastroenterology: Positive for reflux  Genitourinary:  Negative nocturia  Musculoskeletal:  Positive for back pain;joint pain;arthritis  Neurologic:  Positive for headaches  Psychiatric:  Negative excessive stress  Heme/Lymph/Imm:  Negative allergies  Endocrine:  Negative      Physical Exam:  Vitals: /70    "Pulse 57   Ht 1.651 m (5' 5\")   Wt 91.4 kg (201 lb 6.4 oz)   BMI 33.51 kg/m       Constitutional:  cooperative, alert and oriented, well developed, well nourished, in no acute distress obese using a walker    Skin:  warm and dry to the touch, no apparent skin lesions or masses noted   right leg wrapped    Head:  normocephalic        Eyes:  pupils equal and round        ENT:  no pallor or cyanosis, dentition good        Neck:  JVP normal        Chest:  clear to auscultation        Cardiac:   irregular rhythm                Abdomen:  abdomen soft obese      Vascular: pulses full and equal     right radial artery;2+             left radial artery;2+                  Extremities and Back:           Neurological:  no gross motor deficits          Recent Lab Results:  LIPID RESULTS:  Lab Results   Component Value Date    CHOL 157 05/30/2019    HDL 43 05/30/2019    LDL 92 05/30/2019    TRIG 110 05/30/2019    CHOLHDLRATIO 3.6 06/03/2015       LIVER ENZYME RESULTS:  Lab Results   Component Value Date    AST 22 11/13/2019    ALT 18 11/13/2019       CBC RESULTS:  Lab Results   Component Value Date    WBC 9.8 12/10/2019    RBC 3.76 (L) 12/10/2019    HGB 10.6 (L) 12/10/2019    HCT 33.5 (L) 12/10/2019    MCV 89 12/10/2019    MCH 28.2 12/10/2019    MCHC 31.6 12/10/2019    RDW 13.9 12/10/2019     12/10/2019       BMP RESULTS:  Lab Results   Component Value Date     12/07/2019    POTASSIUM 3.6 12/07/2019    CHLORIDE 103 12/07/2019    CO2 30 12/07/2019    ANIONGAP 3 12/07/2019    GLC 92 12/07/2019    BUN 21 12/07/2019    CR 0.99 12/08/2019    GFRESTIMATED 72 12/08/2019    GFRESTBLACK 84 12/08/2019    AWILDA 8.8 12/07/2019        A1C RESULTS:  Lab Results   Component Value Date    A1C 5.9 04/25/2017       INR RESULTS:  Lab Results   Component Value Date    INR 3.1 (A) 12/18/2019    INR 2.1 (A) 12/13/2019           Thank you for allowing me to participate in the care of your patient.    Sincerely,     Anabell Xiao, " APRN CNP     [facility]

## 2019-12-26 NOTE — TELEPHONE ENCOUNTER
Dr. Vasquez,    I saw Fausto [facility] today in clinic.  Pt has chronic first-degree AV conduction delay, RBBB and LAFB.   He was hospitalized in 11/2019 for a leg wound and found to have HRs in the 30-40s with intermittent second degree block at night after 48 hours of no AV node blockers (PTA medication - metoprolol tartrate 25 mg twice daily.  He wore a 30 day monitor which revealed episodes of atrial fibrillation with rates  bpm and NSVT.  He had no pauses and no bradycardia.   He is complaining of intermittent dizziness, lightheadedness with position changes.      He continues to have a open wound to his calf which is packed daily. He and his wife think it will take months to heal.  He is seeing his surgeon on 1/3/2020 and I will ask for the results.      Plan:     wait on pacemaker until wound heals.  At some point he will likely need paceamaker as his heart rates are not perfectly controlled.  I will repeat the zio patch monitor in 3 months.

## 2019-12-26 NOTE — LETTER
12/26/2019    Chris Flower MD  [address] [place] [address]    RE: Fausto Farr       Dear Colleague,    I had the pleasure of seeing Fausto Farr in the [facility].    HPI:  Fausto Farr is a 79 year old male who presents to review device information.   He is a patient of Dr. Santo, has seen Dr. Vasquez (EP) and Dr. Wyatt and Dr. Whitman (vascular clinic). His medical history includes     1. Valvular heart disease.  AVR in 2006 with subsequent perivalvular leak and redo mechanical AVR and concomitant mechanical MVR in 2013.   2. Coronary artery disease with CABG (LIMA to LAD and radial graft to RCA) in 2006.    3. Chronic diastolic heart failure.  LVEF is 55%-60%.   4.  Previous endovascular AAA repair.  In 2010 by Dr. Mays from Vascular Surgery as well as IR.   Has   5. AV conduction disease with bifascicular block and prolonged AZ.   6. Atrial flutter s/p CTI ablation (4/2019)  7. Obstructive sleep apnea with use of CPAP.   8. Hypertension.   9. Dyslipidemia.   10. Chronic back pain.   11. Mild internal carotid artery disease.  Severe stenosis of left external carotid artery.'  12. Typical atrial flutter, s/p  typical CTI atrial flutter ablation (4/2019)  13. Varicose veins with venous insufficiency.  Treatment with VenaSeal closure, sclerotherapy and phlebectomy by Dr. Whitman in 01/2019.       Diagnostics:    ECHO (2018) revealed EF 55%.  right ventricle is mildly dilated. Mildly decreased right ventricular systolic function,  Mechanical mitral valve. 27mm St Solo. Both leaflets open well, mean gradient 4-5mmHg (normal). Physiologic MR. No vegetations, There is a mechanical aortic valve. 21mm St Solo. Views are limited of the valve leaflets. Mean gradient 11mmHg. No AI. Probably no vegetations    Nuclear stress test (4/2019) revealed fixed inferior septal defect and a second fixed apical defect. There is no significant reversibility on this study to  suggest ischemia.  EF 59%.     In November 2018, patient was hospitalized for nonhealing left lower leg wound for debridement and skin grafting.  Postoperatively he was found to be bradycardic with second-degree AV block and often 2-1 AV conduction.  At that time patient reported intermittent episodes of lightheadedness lasting no longer than 10 seconds.  Pedro Jeffries did not recommend proceeding with a pacemaker until his leg is fully healed    On 12/11, he was re-admitted to the hospital for dehiscence of his right posterior calf wound.       Today he presents after wearing a monitor for 30 days.  It revealed episodes of atrial fibrillation with RVR and episodes of and NSVT, no bradycardia, no pauses.    He states his right calf remains open and is packed daily.  He is scheduled to see Dr. Martinez at [facility] on 1/3/2019.  He continues to have lightheadedness when going up the stairs and intermittently when standing up.   At this time he denies chest pain or pressure, syncope, angina, dyspnea at rest or with exertion, palpitations.  He does not have Heart failure symptoms and denies orthopnea, PND, and abdominal edema. 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     This occurred at night while on metoprolol tartrate 25 mg twice daily     30 day monitor revealed episodes of atrial fibrillation with rates between  bpm, intermittent NSVT, there were no episodes of bradycardia or pauses.      Will reach out to Dr Vasquez to review the monitor results as patient continues to have a open wound and his monitor revealed no bradycardia and no pauses he may not need a ppm at this time.      Will request Dr. Martinez's notes regarding wound status.      Paroxsymal Atrial fibrillation    On 11/2019, pt's metoprolol tartrate was discontinued due to second degree AV  block, likely mobitz II    He continues to have episodes of atrial fibrillation with rates  bpm while on no AV node blockers    For anticoagulation for CHADS VASC 4 (CAD, Age++, HTN) he is taking warfarin with goal INR 2.5-3.5 due mechanical MVR.      Coronary artery disease     CABG (LIMA to LAD and radial graft to RCA) in 2006.      Asymptomatic    No AV node blockers due to AV block see above    No aspirin due to warfarin use    Currently taking Crestor 40 mg daily and Zetia 10 mg daily    Right leg wound    Per patient his right calf remains open with daily packing.  He will see his surgeon on 1/3     Patient expresses understanding and agreement with the plan.     I appreciate the chance to help with Fausto Farr Please let me know if you have any questions or concerns.    ADELE Putnam, CNP    This note was completed in part using Dragon voice recognition software. Although reviewed after completion, some word and grammatical errors may occur.    Orders Placed This Encounter   Procedures     Follow-Up with Cardiologist     No orders of the defined types were placed in this encounter.    There are no discontinued medications.      Encounter Diagnoses   Name Primary?     AV block, Mobitz 2      Valvular heart disease Yes       CURRENT MEDICATIONS:  Current Outpatient Medications   Medication Sig Dispense Refill     acetaminophen (TYLENOL) 650 MG CR tablet Take 650 mg by mouth every 8 hours as needed for mild pain or fever       betamethasone valerate (VALISONE) 0.1 % external lotion Apply topically 2 times daily Apply topically to scalp twice daily PRN 60 mL 3     cholecalciferol 25 MCG (1000 UT) TABS Take 1,000 Units by mouth daily       ezetimibe (ZETIA) 10 MG tablet Take 1 tablet (10 mg) by mouth daily 90 tablet 3     ferrous sulfate (FEROSUL) 325 (65 Fe) MG tablet Take 325 mg by mouth daily (with breakfast)       fluocinonide (LIDEX) 0.05 % external ointment Apply topically 2 times daily as  needed       furosemide (LASIX) 40 MG tablet Take 0.5 tablets (20 mg) by mouth daily 45 tablet 3     glucosamine-chondroitin 500-400 MG CAPS per capsule Take 1 capsule by mouth 2 times daily       HYDROcodone-acetaminophen (NORCO) 5-325 MG tablet Take 1-2 tablets by mouth every 6 hours as needed for severe pain 30 tablet 0     mesalamine (ASACOL HD) 800 MG EC tablet Take 1 tablet (800 mg) by mouth 2 times daily 180 tablet 11     methocarbamol (ROBAXIN) 750 MG tablet Take 1 tablet (750 mg) by mouth every 6 hours as needed for muscle spasms 30 tablet 0     rosuvastatin (CRESTOR) 40 MG tablet Take 1 tablet (40 mg) by mouth daily 90 tablet 3     tamsulosin (FLOMAX) 0.4 MG capsule TAKE 1 CAPSULE BY MOUTH EVERY DAY 90 capsule 3     warfarin ANTICOAGULANT (COUMADIN) 5 MG tablet Take 1 tablet (5 mg) by mouth daily 5 mg  every Mon, Wed, Fri; 7.5 mg (5 mg x 1.5) all other days or as instructed by INR nurse 135 tablet 0     senna-docusate (SENOKOT-S/PERICOLACE) 8.6-50 MG tablet Take 1-2 tablets by mouth 2 times daily as needed for constipation (Patient not taking: Reported on 12/26/2019) 60 tablet 0     warfarin ANTICOAGULANT (COUMADIN) 5 MG tablet Take 7.5 mg by mouth daily On Sundays, Tuesdays, Thursdays, and Saturdays         ALLERGIES     Allergies   Allergen Reactions     Bees Anaphylaxis       PAST MEDICAL HISTORY:  Past Medical History:   Diagnosis Date     Abdominal pain      Abnormal ECG     RBBB, 1st degree AVB, Left axis deviation     Anemia     currently taking iron     Arrhythmia     pac, pvc     Back pain     since 1980     BPH (benign prostatic hyperplasia)      Bruit      CAD (coronary artery disease)      Cellulitis 10/18/12     Cellulitis 05/2018    GrpB strep LLE cellulitis  negative RACHAEL for veg     Chronic venous insufficiency     bilat lower extremities     Contact dermatitis and other eczema, due to unspecified cause      Diaphragmatic hernia without mention of obstruction or gangrene      Diastolic HF  (heart failure) (H)      Gastric ulcer      Glucose intolerance (impaired glucose tolerance)      Heart murmur 9/16/13    valvular heart disease     Hyperlipidemia LDL goal <100 8/6/2013     Hypertension 8/6/13     Lumbago      Malaise and fatigue      Metabolic syndrome      Mobitz (type) I (Wenckebach's) atrioventricular block     and RBBB     Nocturia 10/18/12     Nonallopathic lesion of cervical region      Nonallopathic lesion of lumbar region      Nonallopathic lesion of pelvic region, not elsewhere classified      Nonallopathic lesion of rib cage      Nonallopathic lesion of sacral region      GAGE (obstructive sleep apnea)     CPAP     Paroxysmal atrial fibrillation (H) 10/18/12     Prostate cancer (H) 2008    radiation seed, XRT      PVD (peripheral vascular disease) (H)      RBBB     1st degree AVB, RBBB, LAHB     Rotator cuff strain     and sprain     S/P AAA repair      S/P aortic valve replacement 2006    developed perivalve leak and MS, therefore redo surg 2013     S/P CABG (coronary artery bypass graft) 2006    Lima-Lad, RA-Rca     Sciatica of left side     since 2000     Sepsis due to group B Streptococcus (H) 5/19/2018     Ulcerative colitis (H)      Varicose veins of bilateral lower extremities with other complications     s/p RLE vein stripping     Vitamin D deficiency        PAST SURGICAL HISTORY:  Past Surgical History:   Procedure Laterality Date     AORTIC VALVE REPLACEMENT  1/3/06    redo AVR SJM 21mm and SJM MVR 27mm in 2013SJM 21([id number] [id number]):AVR, SJM 27 :MVR-     APPLY WOUND VAC N/A 11/12/2019    Procedure: APPLICATION, WOUND VAC;  Surgeon: Sara Martinez MD;  Location: [facility] OR     ARTHROPLASTY KNEE      right knee     ARTHROPLASTY KNEE Right 7/22/2019    Procedure: Right total knee arthroplasty;  Surgeon: Prasanth Jensen MD;  Location: [facility] OR     BACK SURGERY  Oct 2015    Fusion L4-5, laminectomy L2, L3     BYPASS GRAFT ARTERY CORONARY  10/2013    reimplantation of radial  artery graft to RCA     C CABG, VEIN, TWO  1/3/06    Left radial to RCA, LIMA to LAD (RA to RCA reimplanted at time of 2013 surg)     CARDIAC CATHERIZATION  11/2005    Stent placed to RCA     CARDIAC CATHERIZATION  04/2013    Occluded RCA, patent LIMA to LAD and radial graft to PDA     CARPAL TUNNEL RELEASE RT/LT  1994     COLONOSCOPY  8-22-11     CYSTOSCOPY FLEXIBLE  10/16/2013    Procedure: CYSTOSCOPY FLEXIBLE;  FLEXIBLE CYSTOSCOPY / DILATION OF URETHRA / INSERTION OF LESLIE;  Surgeon: Cooper Wallace MD;  Location:  OR     ENDOVASCULAR REPAIR, INFRARENAL ABDOMINAL AORTIC ANEURYSM/DISSECTION; MODULAR BIFURCATED PROSTHESIS  2006    AAA repair endovascular     ENT SURGERY       EP ABLATION ATRIAL FLUTTER N/A 4/22/2019    Procedure: EP Ablation Atrial Flutter;  Surgeon: Jessy Vasquez MD;  Location:  [facility]     GENITOURINARY SURGERY  6/16/08    radioactive seeding     GRAFT FLAP PEDICLE EXTREMITY (LOCATION) Right 11/12/2019    Procedure: SURGICAL PROCUREMENT, FLAP, PEDICLE, EXTREMITY;  Surgeon: Sara Martinez MD;  Location:  OR     GRAFT SKIN SPLIT THICKNESS FROM EXTREMITY Right 11/12/2019    Procedure: RIGHT GASTRONEMIUS FLAP TO RIGHT KNEE, SPLIT THICKNESS SKIN GRAFT FROM RIGHT THIGH TO RIGHT KNEE, SURGICAL PROCUREMENT, FLAP, PEDICLE, EXTREMITY, WOUND VAC PLACEMENT;  Surgeon: Sara Martinez MD;  Location:  OR     HEAD & NECK SURGERY  1997    vocal cord polypectomy     INCISION AND DRAINAGE KNEE, COMBINED Right 8/29/2019    Procedure: INCISION AND DRAINAGE, KNEE W/ Secondary Wound Closure;  Surgeon: Prasanth Jensen MD;  Location:  OR     IRRIGATION AND DEBRIDEMENT LOWER EXTREMITY, COMBINED Right 12/8/2019    Procedure: DEBRIDEMENT OF RIGHT CALF AND WOUND CLOSURE.;  Surgeon: Sara Martinez MD;  Location:  OR     KNEE SURGERY  2001 Right knee arthroscopy     OPTICAL TRACKING SYSTEM FUSION SPINE POSTERIOR LUMBAR THREE+ LEVELS N/A 10/29/2015     Procedure: OPTICAL TRACKING SYSTEM FUSION SPINE POSTERIOR LUMBAR THREE+ LEVELS;  Surgeon: Walt Garcia MD;  Location: [facility] OR     PROSTATE SURGERY      radioactive seeding 08     REPAIR ANEURYSM ABDOMINAL AORTA       REPAIR VALVE MITRAL  10/16/2013    SJM 21(AGFN 756):AVR, SJM 27  501:MVRProcedure: REPAIR VALVE MITRAL;  REDO STERNOTOMY/REDO AORTIC VALVE REPLACEMENT/ MITRAL VALVE REPLACEMENT/REIMPLANTATION OF RIGHT CORONARY ARTERY BYPASS WITH RACHAEL ( ON PUMP);  Surgeon: Viet Singh MD;  Location:  OR     REPLACE VALVE AORTIC  10/16/2013    Procedure: REPLACE VALVE AORTIC;;  Surgeon: Viet Singh MD;  Location: [facility] OR     SURGERY GENERAL IP CONSULT  2008 Excision aneurysm abdominal aorta     SURGERY GENERAL IP CONSULT   Vocal cord polypectomy     VASCULAR SURGERY  1993     varicose vein stripping       FAMILY HISTORY:  Family History   Problem Relation Age of Onset     No Known Problems Mother      Coronary Artery Disease Father         CABG     Heart Disease Father         Pacemaker     No Known Problems Brother      No Known Problems Sister      No Known Problems Son      Other Cancer Daughter      No Known Problems Daughter      Heart Disease Brother      Other - See Comments Grandchild        SOCIAL HISTORY:  Social History     Socioeconomic History     Marital status:      Spouse name: None     Number of children: None     Years of education: None     Highest education level: None   Occupational History     None   Social Needs     Financial resource strain: None     Food insecurity:     Worry: None     Inability: None     Transportation needs:     Medical: None     Non-medical: None   Tobacco Use     Smoking status: Former Smoker     Packs/day: 1.00     Years: 40.00     Pack years: 40.00     Start date: 4/15/1962     Last attempt to quit: 10/23/2002     Years since quittin.1     Smokeless tobacco: Never Used   Substance and Sexual Activity     Alcohol  use: Yes     Frequency: 2-4 times a month     Drinks per session: 1 or 2     Comment: a couple beers per week (socially)     Drug use: No     Sexual activity: Never   Lifestyle     Physical activity:     Days per week: None     Minutes per session: None     Stress: None   Relationships     Social connections:     Talks on phone: None     Gets together: None     Attends [employer] service: None     Active member of club or organization: None     Attends meetings of clubs or organizations: None     Relationship status: None     Intimate partner violence:     Fear of current or ex partner: None     Emotionally abused: None     Physically abused: None     Forced sexual activity: None   Other Topics Concern     Parent/sibling w/ CABG, MI or angioplasty before 65F 55M? Yes     Comment: Brother had bypass at 55      Service Not Asked     Blood Transfusions Not Asked     Caffeine Concern No     Comment: 6-8 cups of half and half per day     Occupational Exposure Not Asked     Hobby Hazards Not Asked     Sleep Concern Not Asked     Stress Concern Not Asked     Weight Concern Not Asked     Special Diet No     Back Care Not Asked     Exercise No     Bike Helmet Not Asked     Seat Belt Not Asked     Self-Exams Not Asked   Social History Narrative     None       Review of Systems:  Skin:  Negative rash   Eyes:  Positive for glasses  ENT:  Positive for hearing loss  Respiratory:  Positive for dyspnea on exertion;sleep apnea;CPAP  Cardiovascular:  Negative;palpitations;chest pain;syncope or near-syncope;cyanosis;fatigue;lightheadedness;dizziness;exercise intolerance Positive for;lightheadedness;dizziness;fatigue  Gastroenterology: Positive for reflux  Genitourinary:  Negative nocturia  Musculoskeletal:  Positive for back pain;joint pain;arthritis  Neurologic:  Positive for headaches  Psychiatric:  Negative excessive stress  Heme/Lymph/Imm:  Negative allergies  Endocrine:  Negative      Physical Exam:  Vitals: /70    "Pulse 57   Ht 1.651 m (5' 5\")   Wt 91.4 kg (201 lb 6.4 oz)   BMI 33.51 kg/m       Constitutional:  cooperative, alert and oriented, well developed, well nourished, in no acute distress obese using a walker    Skin:  warm and dry to the touch, no apparent skin lesions or masses noted   right leg wrapped    Head:  normocephalic        Eyes:  pupils equal and round        ENT:  no pallor or cyanosis, dentition good        Neck:  JVP normal        Chest:  clear to auscultation        Cardiac:   irregular rhythm                Abdomen:  abdomen soft obese      Vascular: pulses full and equal     right radial artery;2+             left radial artery;2+                  Extremities and Back:           Neurological:  no gross motor deficits          Recent Lab Results:  LIPID RESULTS:  Lab Results   Component Value Date    CHOL 157 05/30/2019    HDL 43 05/30/2019    LDL 92 05/30/2019    TRIG 110 05/30/2019    CHOLHDLRATIO 3.6 06/03/2015       LIVER ENZYME RESULTS:  Lab Results   Component Value Date    AST 22 11/13/2019    ALT 18 11/13/2019       CBC RESULTS:  Lab Results   Component Value Date    WBC 9.8 12/10/2019    RBC 3.76 (L) 12/10/2019    HGB 10.6 (L) 12/10/2019    HCT 33.5 (L) 12/10/2019    MCV 89 12/10/2019    MCH 28.2 12/10/2019    MCHC 31.6 12/10/2019    RDW 13.9 12/10/2019     12/10/2019       BMP RESULTS:  Lab Results   Component Value Date     12/07/2019    POTASSIUM 3.6 12/07/2019    CHLORIDE 103 12/07/2019    CO2 30 12/07/2019    ANIONGAP 3 12/07/2019    GLC 92 12/07/2019    BUN 21 12/07/2019    CR 0.99 12/08/2019    GFRESTIMATED 72 12/08/2019    GFRESTBLACK 84 12/08/2019    AWILDA 8.8 12/07/2019        A1C RESULTS:  Lab Results   Component Value Date    A1C 5.9 04/25/2017       INR RESULTS:  Lab Results   Component Value Date    INR 3.1 (A) 12/18/2019    INR 2.1 (A) 12/13/2019           CC  Jessy Vasquez MD  [id number]  [place] [id number]                  Thank you for " allowing me to participate in the care of your patient.      Sincerely,     ADELE Salomon [facility]    cc:   Jessy Vasquez MD  [id number]  [place] [id number]

## 2019-12-26 NOTE — PROGRESS NOTES
HPI:  Fausto Farr is a 79 year old male who presents to review device information.   He is a patient of Dr. Santo, has seen Dr. Vasquez (EP) and Dr. Wyatt and Dr. Whitman (vascular clinic). His medical history includes     1. Valvular heart disease.  AVR in 2006 with subsequent perivalvular leak and redo mechanical AVR and concomitant mechanical MVR in 2013.   2. Coronary artery disease with CABG (LIMA to LAD and radial graft to RCA) in 2006.    3. Chronic diastolic heart failure.  LVEF is 55%-60%.   4.  Previous endovascular AAA repair.  In 2010 by Dr. Mays from [facility] as well as IR.   Has   5. AV conduction disease with bifascicular block and prolonged WA.   6. Atrial flutter s/p CTI ablation (4/2019)  7. Obstructive sleep apnea with use of CPAP.   8. Hypertension.   9. Dyslipidemia.   10. Chronic back pain.   11. Mild internal carotid artery disease.  Severe stenosis of left external carotid artery.'  12. Typical atrial flutter, s/p typical CTI atrial flutter ablation (4/2019)  13. Varicose veins with venous insufficiency.  Treatment with VenaSeal closure, sclerotherapy and phlebectomy by Dr. Whitmna in 01/2019.       Diagnostics:    ECHO (2018) revealed EF 55%.  right ventricle is mildly dilated. Mildly decreased right ventricular systolic function,  Mechanical mitral valve. 27mm St Solo. Both leaflets open well, mean gradient 4-5mmHg (normal). Physiologic MR. No vegetations, There is a mechanical aortic valve. 21mm St Solo. Views are limited of the valve leaflets. Mean gradient 11mmHg. No AI. Probably no vegetations    Nuclear stress test (4/2019) revealed fixed inferior septal defect and a second fixed apical defect. There is no significant reversibility on this study to suggest ischemia.  EF 59%.     In November 2018, patient was hospitalized for nonhealing left lower leg wound for debridement and skin grafting.  Postoperatively he was found to be bradycardic with second-degree AV block and often 2-1  AV conduction.  At that time patient reported intermittent episodes of lightheadedness lasting no longer than 10 seconds.  Pedro Jeffries did not recommend proceeding with a pacemaker until his leg is fully healed    On 12/11, he was re-admitted to the hospital for dehiscence of his right posterior calf wound.       Today he presents after wearing a monitor for 30 days.  It revealed episodes of atrial fibrillation with RVR and episodes of and NSVT, no bradycardia, no pauses.    He states his right calf remains open and is packed daily.  He is scheduled to see Dr. Martinez at [facility] on 1/3/2019.  He continues to have lightheadedness when going up the stairs and intermittently when standing up.   At this time he denies chest pain or pressure, syncope, angina, dyspnea at rest or with exertion, palpitations.  He does not have Heart failure symptoms and denies orthopnea, PND, and abdominal edema. 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     This occurred at night while on metoprolol tartrate 25 mg twice daily     30 day monitor revealed episodes of atrial fibrillation with rates between  bpm, intermittent NSVT, there were no episodes of bradycardia or pauses.      Will reach out to Dr Vasquez to review the monitor results as patient continues to have a open wound and his monitor revealed no bradycardia and no pauses he may not need a ppm at this time.      Will request Dr. Martinez's notes regarding wound status.      Paroxsymal Atrial fibrillation    On 11/2019, pt's metoprolol tartrate was discontinued due to second degree AV block, likely mobitz II    He continues to have episodes of atrial fibrillation with rates  bpm while on no AV node blockers    For anticoagulation for CHADS VASC 4 (CAD, Age++, HTN) he is taking warfarin with goal INR 2.5-3.5  due mechanical MVR.      Coronary artery disease     CABG (LIMA to LAD and radial graft to RCA) in 2006.      Asymptomatic    No AV node blockers due to AV block see above    No aspirin due to warfarin use    Currently taking Crestor 40 mg daily and Zetia 10 mg daily    Right leg wound    Per patient his right calf remains open with daily packing.  He will see his surgeon on 1/3     Patient expresses understanding and agreement with the plan.     I appreciate the chance to help with Fausto Farr Please let me know if you have any questions or concerns.    ADELE Putnam, CNP    This note was completed in part using Dragon voice recognition software. Although reviewed after completion, some word and grammatical errors may occur.    Orders Placed This Encounter   Procedures     Follow-Up with Cardiologist     No orders of the defined types were placed in this encounter.    There are no discontinued medications.      Encounter Diagnoses   Name Primary?     AV block, Mobitz 2      Valvular heart disease Yes       CURRENT MEDICATIONS:  Current Outpatient Medications   Medication Sig Dispense Refill     acetaminophen (TYLENOL) 650 MG CR tablet Take 650 mg by mouth every 8 hours as needed for mild pain or fever       betamethasone valerate (VALISONE) 0.1 % external lotion Apply topically 2 times daily Apply topically to scalp twice daily PRN 60 mL 3     cholecalciferol 25 MCG (1000 UT) TABS Take 1,000 Units by mouth daily       ezetimibe (ZETIA) 10 MG tablet Take 1 tablet (10 mg) by mouth daily 90 tablet 3     ferrous sulfate (FEROSUL) 325 (65 Fe) MG tablet Take 325 mg by mouth daily (with breakfast)       fluocinonide (LIDEX) 0.05 % external ointment Apply topically 2 times daily as needed       furosemide (LASIX) 40 MG tablet Take 0.5 tablets (20 mg) by mouth daily 45 tablet 3     glucosamine-chondroitin 500-400 MG CAPS per capsule Take 1 capsule by mouth 2 times daily       HYDROcodone-acetaminophen (NORCO)  5-325 MG tablet Take 1-2 tablets by mouth every 6 hours as needed for severe pain 30 tablet 0     mesalamine (ASACOL HD) 800 MG EC tablet Take 1 tablet (800 mg) by mouth 2 times daily 180 tablet 11     methocarbamol (ROBAXIN) 750 MG tablet Take 1 tablet (750 mg) by mouth every 6 hours as needed for muscle spasms 30 tablet 0     rosuvastatin (CRESTOR) 40 MG tablet Take 1 tablet (40 mg) by mouth daily 90 tablet 3     tamsulosin (FLOMAX) 0.4 MG capsule TAKE 1 CAPSULE BY MOUTH EVERY DAY 90 capsule 3     warfarin ANTICOAGULANT (COUMADIN) 5 MG tablet Take 1 tablet (5 mg) by mouth daily 5 mg  every Mon, Wed, Fri; 7.5 mg (5 mg x 1.5) all other days or as instructed by INR nurse 135 tablet 0     senna-docusate (SENOKOT-S/PERICOLACE) 8.6-50 MG tablet Take 1-2 tablets by mouth 2 times daily as needed for constipation (Patient not taking: Reported on 12/26/2019) 60 tablet 0     warfarin ANTICOAGULANT (COUMADIN) 5 MG tablet Take 7.5 mg by mouth daily On Sundays, Tuesdays, Thursdays, and Saturdays         ALLERGIES     Allergies   Allergen Reactions     Bees Anaphylaxis       PAST MEDICAL HISTORY:  Past Medical History:   Diagnosis Date     Abdominal pain      Abnormal ECG     RBBB, 1st degree AVB, Left axis deviation     Anemia     currently taking iron     Arrhythmia     pac, pvc     Back pain     since 1980     BPH (benign prostatic hyperplasia)      Bruit      CAD (coronary artery disease)      Cellulitis 10/18/12     Cellulitis 05/2018    GrpB strep LLE cellulitis  negative RACHAEL for veg     Chronic venous insufficiency     bilat lower extremities     Contact dermatitis and other eczema, due to unspecified cause      Diaphragmatic hernia without mention of obstruction or gangrene      Diastolic HF (heart failure) (H)      Gastric ulcer      Glucose intolerance (impaired glucose tolerance)      Heart murmur 9/16/13    valvular heart disease     Hyperlipidemia LDL goal <100 8/6/2013     Hypertension 8/6/13     Lumbago       Malaise and fatigue      Metabolic syndrome      Mobitz (type) I (Wenckebach's) atrioventricular block     and RBBB     Nocturia 10/18/12     Nonallopathic lesion of cervical region      Nonallopathic lesion of lumbar region      Nonallopathic lesion of pelvic region, not elsewhere classified      Nonallopathic lesion of rib cage      Nonallopathic lesion of sacral region      GAGE (obstructive sleep apnea)     CPAP     Paroxysmal atrial fibrillation (H) 10/18/12     Prostate cancer (H) 2008    radiation seed, XRT      PVD (peripheral vascular disease) (H)      RBBB     1st degree AVB, RBBB, LAHB     Rotator cuff strain     and sprain     S/P AAA repair      S/P aortic valve replacement 2006    developed perivalve leak and MS, therefore redo surg 2013     S/P CABG (coronary artery bypass graft) 2006    Lima-Lad, RA-Rca     Sciatica of left side     since 2000     Sepsis due to group B Streptococcus (H) 5/19/2018     Ulcerative colitis (H)      Varicose veins of bilateral lower extremities with other complications     s/p RLE vein stripping     Vitamin D deficiency        PAST SURGICAL HISTORY:  Past Surgical History:   Procedure Laterality Date     AORTIC VALVE REPLACEMENT  1/3/06    redo AVR SJM 21mm and SJM MVR 27mm in 2013SJM 21([id number]):AVR, SJM 27 :MVR-     APPLY WOUND VAC N/A 11/12/2019    Procedure: APPLICATION, WOUND VAC;  Surgeon: Sara Martinez MD;  Location: [facility] OR     ARTHROPLASTY KNEE      right knee     ARTHROPLASTY KNEE Right 7/22/2019    Procedure: Right total knee arthroplasty;  Surgeon: Prasanth Jensen MD;  Location: [facility] OR     BACK SURGERY  Oct 2015    Fusion L4-5, laminectomy L2, L3     BYPASS GRAFT ARTERY CORONARY  10/2013    reimplantation of radial artery graft to RCA     C CABG, VEIN, TWO  1/3/06    Left radial to RCA, LIMA to LAD (RA to RCA reimplanted at time of 2013 surg)     CARDIAC CATHERIZATION  11/2005    Stent placed to RCA     CARDIAC CATHERIZATION  04/2013     Occluded RCA, patent LIMA to LAD and radial graft to PDA     CARPAL TUNNEL RELEASE RT/LT  1994     COLONOSCOPY  8-22-11     CYSTOSCOPY FLEXIBLE  10/16/2013    Procedure: CYSTOSCOPY FLEXIBLE;  FLEXIBLE CYSTOSCOPY / DILATION OF URETHRA / INSERTION OF LESLIE;  Surgeon: Cooper Wallace MD;  Location:  OR     ENDOVASCULAR REPAIR, INFRARENAL ABDOMINAL AORTIC ANEURYSM/DISSECTION; MODULAR BIFURCATED PROSTHESIS  2006    AAA repair endovascular     ENT SURGERY       EP ABLATION ATRIAL FLUTTER N/A 4/22/2019    Procedure: EP Ablation Atrial Flutter;  Surgeon: Jessy Vasquez MD;  Location:  [facility]     GENITOURINARY SURGERY  6/16/08    radioactive seeding     GRAFT FLAP PEDICLE EXTREMITY (LOCATION) Right 11/12/2019    Procedure: SURGICAL PROCUREMENT, FLAP, PEDICLE, EXTREMITY;  Surgeon: Sara Martinez MD;  Location:  OR     GRAFT SKIN SPLIT THICKNESS FROM EXTREMITY Right 11/12/2019    Procedure: RIGHT GASTRONEMIUS FLAP TO RIGHT KNEE, SPLIT THICKNESS SKIN GRAFT FROM RIGHT THIGH TO RIGHT KNEE, SURGICAL PROCUREMENT, FLAP, PEDICLE, EXTREMITY, WOUND VAC PLACEMENT;  Surgeon: Sara Martinez MD;  Location:  OR     HEAD & NECK SURGERY  1997    vocal cord polypectomy     INCISION AND DRAINAGE KNEE, COMBINED Right 8/29/2019    Procedure: INCISION AND DRAINAGE, KNEE W/ Secondary Wound Closure;  Surgeon: Prasanth Jensen MD;  Location:  OR     IRRIGATION AND DEBRIDEMENT LOWER EXTREMITY, COMBINED Right 12/8/2019    Procedure: DEBRIDEMENT OF RIGHT CALF AND WOUND CLOSURE.;  Surgeon: Sara Martinez MD;  Location:  OR     KNEE SURGERY  2001 Right knee arthroscopy     OPTICAL TRACKING SYSTEM FUSION SPINE POSTERIOR LUMBAR THREE+ LEVELS N/A 10/29/2015    Procedure: OPTICAL TRACKING SYSTEM FUSION SPINE POSTERIOR LUMBAR THREE+ LEVELS;  Surgeon: Walt Garcia MD;  Location:  OR     PROSTATE SURGERY      radioactive seeding 6/16/08     REPAIR ANEURYSM ABDOMINAL AORTA  06/08      REPAIR VALVE MITRAL  10/16/2013    SJM 21([id number]):AVR, SJM 27 :MVRProcedure: REPAIR VALVE MITRAL;  REDO STERNOTOMY/REDO AORTIC VALVE REPLACEMENT/ MITRAL VALVE REPLACEMENT/REIMPLANTATION OF RIGHT CORONARY ARTERY BYPASS WITH RACHAEL ( ON PUMP);  Surgeon: Viet Singh MD;  Location:  OR     REPLACE VALVE AORTIC  10/16/2013    Procedure: REPLACE VALVE AORTIC;;  Surgeon: Viet Singh MD;  Location:  OR     SURGERY GENERAL IP CONSULT  2008 Excision aneurysm abdominal aorta     SURGERY GENERAL IP CONSULT   Vocal cord polypectomy     VASCULAR SURGERY  1993     varicose vein stripping       FAMILY HISTORY:  Family History   Problem Relation Age of Onset     No Known Problems Mother      Coronary Artery Disease Father         CABG     Heart Disease Father         Pacemaker     No Known Problems Brother      No Known Problems Sister      No Known Problems Son      Other Cancer Daughter      No Known Problems Daughter      Heart Disease Brother      Other - See Comments Grandchild        SOCIAL HISTORY:  Social History     Socioeconomic History     Marital status:      Spouse name: None     Number of children: None     Years of education: None     Highest education level: None   Occupational History     None   Social Needs     Financial resource strain: None     Food insecurity:     Worry: None     Inability: None     Transportation needs:     Medical: None     Non-medical: None   Tobacco Use     Smoking status: Former Smoker     Packs/day: 1.00     Years: 40.00     Pack years: 40.00     Start date: 4/15/1962     Last attempt to quit: 10/23/2002     Years since quittin.1     Smokeless tobacco: Never Used   Substance and Sexual Activity     Alcohol use: Yes     Frequency: 2-4 times a month     Drinks per session: 1 or 2     Comment: a couple beers per week (socially)     Drug use: No     Sexual activity: Never   Lifestyle     Physical activity:     Days per week: None      "Minutes per session: None     Stress: None   Relationships     Social connections:     Talks on phone: None     Gets together: None     Attends [employer] service: None     Active member of club or organization: None     Attends meetings of clubs or organizations: None     Relationship status: None     Intimate partner violence:     Fear of current or ex partner: None     Emotionally abused: None     Physically abused: None     Forced sexual activity: None   Other Topics Concern     Parent/sibling w/ CABG, MI or angioplasty before 65F 55M? Yes     Comment: Brother had bypass at 55      Service Not Asked     Blood Transfusions Not Asked     Caffeine Concern No     Comment: 6-8 cups of half and half per day     Occupational Exposure Not Asked     Hobby Hazards Not Asked     Sleep Concern Not Asked     Stress Concern Not Asked     Weight Concern Not Asked     Special Diet No     Back Care Not Asked     Exercise No     Bike Helmet Not Asked     Seat Belt Not Asked     Self-Exams Not Asked   Social History Narrative     None       Review of Systems:  Skin:  Negative rash   Eyes:  Positive for glasses  ENT:  Positive for hearing loss  Respiratory:  Positive for dyspnea on exertion;sleep apnea;CPAP  Cardiovascular:  Negative;palpitations;chest pain;syncope or near-syncope;cyanosis;fatigue;lightheadedness;dizziness;exercise intolerance Positive for;lightheadedness;dizziness;fatigue  Gastroenterology: Positive for reflux  Genitourinary:  Negative nocturia  Musculoskeletal:  Positive for back pain;joint pain;arthritis  Neurologic:  Positive for headaches  Psychiatric:  Negative excessive stress  Heme/Lymph/Imm:  Negative allergies  Endocrine:  Negative      Physical Exam:  Vitals: /70   Pulse 57   Ht 1.651 m (5' 5\")   Wt 91.4 kg (201 lb 6.4 oz)   BMI 33.51 kg/m      Constitutional:  cooperative, alert and oriented, well developed, well nourished, in no acute distress obese using a walker    Skin:  warm and " dry to the touch, no apparent skin lesions or masses noted   right leg wrapped    Head:  normocephalic        Eyes:  pupils equal and round        ENT:  no pallor or cyanosis, dentition good        Neck:  JVP normal        Chest:  clear to auscultation        Cardiac:   irregular rhythm                Abdomen:  abdomen soft obese      Vascular: pulses full and equal     right radial artery;2+             left radial artery;2+                  Extremities and Back:           Neurological:  no gross motor deficits          Recent Lab Results:  LIPID RESULTS:  Lab Results   Component Value Date    CHOL 157 05/30/2019    HDL 43 05/30/2019    LDL 92 05/30/2019    TRIG 110 05/30/2019    CHOLHDLRATIO 3.6 06/03/2015       LIVER ENZYME RESULTS:  Lab Results   Component Value Date    AST 22 11/13/2019    ALT 18 11/13/2019       CBC RESULTS:  Lab Results   Component Value Date    WBC 9.8 12/10/2019    RBC 3.76 (L) 12/10/2019    HGB 10.6 (L) 12/10/2019    HCT 33.5 (L) 12/10/2019    MCV 89 12/10/2019    MCH 28.2 12/10/2019    MCHC 31.6 12/10/2019    RDW 13.9 12/10/2019     12/10/2019       BMP RESULTS:  Lab Results   Component Value Date     12/07/2019    POTASSIUM 3.6 12/07/2019    CHLORIDE 103 12/07/2019    CO2 30 12/07/2019    ANIONGAP 3 12/07/2019    GLC 92 12/07/2019    BUN 21 12/07/2019    CR 0.99 12/08/2019    GFRESTIMATED 72 12/08/2019    GFRESTBLACK 84 12/08/2019    AWILDA 8.8 12/07/2019        A1C RESULTS:  Lab Results   Component Value Date    A1C 5.9 04/25/2017       INR RESULTS:  Lab Results   Component Value Date    INR 3.1 (A) 12/18/2019    INR 2.1 (A) 12/13/2019           CC  Jessy Vasquez MD  [id number]  [place] [id number]

## 2019-12-26 NOTE — PATIENT INSTRUCTIONS
I will reach out to Dr. mccoy and review your monitor with him.   I will also reach out to Dr. Mccoy and review your monitor with him and if/when we should pursue an pacemaker.

## 2019-12-27 ENCOUNTER — TELEPHONE (OUTPATIENT)
Dept: PEDIATRICS | Facility: CLINIC | Age: 79
End: 2019-12-27

## 2019-12-27 DIAGNOSIS — Z79.01 LONG TERM CURRENT USE OF ANTICOAGULANT THERAPY: ICD-10-CM

## 2019-12-27 DIAGNOSIS — Z95.2 S/P MITRAL VALVE REPLACEMENT: ICD-10-CM

## 2019-12-27 DIAGNOSIS — I48.91 AF (ATRIAL FIBRILLATION) (H): ICD-10-CM

## 2019-12-27 LAB — INR PPP: 4.6 (ref 0.9–1.1)

## 2019-12-27 NOTE — TELEPHONE ENCOUNTER
ANTICOAGULATION MANAGEMENT     Patient Name:  Fausto Farr  Date:  2019    ASSESSMENT /SUBJECTIVE:      Today's INR result of 4.6 is supratherapeutic. Goal INR of 2.5-3.5      Warfarin dose taken: Warfarin taken as previously instructed    Diet: less greens, some alcohol over the holidays may be affecting diet and INR    Medication changes/ interactions: No new medications/supplements affecting INR    Previous INR: Therapeutic     S/S of bleeding or thromboembolism: No    New injury or illness:  No    Upcoming surgery, procedure or cardioversion:  No    Additional findings: None      PLAN:    Spoke with Edith (homecare nurse) regarding INR result and instructed:     Warfarin Dosing Instructions: Hold dose tonight then continue your current warfarin dose of 5mg Mon/Wed/Fri; 7.5mg all other days    Instructed patient to follow up no later than: 1 week    Education provided: Yes: Significance of INR result    Edith verbalizes understanding and agrees to warfarin dosing plan.    Instructed to call the Anticoagulation Clinic for any changes, questions or concerns. (#[phone])        OBJECTIVE:  INR   Date Value Ref Range Status   2019 4.6 (A) 0.90 - 1.10 Final             Anticoagulation Summary  As of 2019    INR goal:   2.5-3.5   TTR:   73.4 % (11.2 mo)   INR used for dosin.6! (2019)   Warfarin maintenance plan:   5 mg (5 mg x 1) every Mon, Wed, Fri; 7.5 mg (5 mg x 1.5) all other days   Full warfarin instructions:   : Hold; Otherwise 5 mg every Mon, Wed, Fri; 7.5 mg all other days   Weekly warfarin total:   45 mg   Plan last modified:   Diane Delcid RN (2019)   Next INR check:   1/3/2020   Priority:   High   Target end date:   Indefinite    Indications    AF (atrial fibrillation) (H) [I48.91]  S/P mitral valve replacement [Z95.2]  Long term current use of anticoagulant therapy [Z79.01]             Anticoagulation Episode Summary     INR check location:        Preferred lab:       Send INR reminders to:   SHANNA DOWELL    Comments:   5mg tabs - andrade dose // transfer from [facility] // [facility] // CALENDAR // right knee replaced 7/22/19      Anticoagulation Care Providers     Provider Role Specialty Phone number    Jodi Flower Mai, MD  Internal Medicine [phone]

## 2020-01-02 ENCOUNTER — TRANSFERRED RECORDS (OUTPATIENT)
Dept: HEALTH INFORMATION MANAGEMENT | Facility: CLINIC | Age: 80
End: 2020-01-02

## 2020-01-03 ENCOUNTER — TELEPHONE (OUTPATIENT)
Dept: CARDIOLOGY | Facility: CLINIC | Age: 80
End: 2020-01-03

## 2020-01-03 ENCOUNTER — TELEPHONE (OUTPATIENT)
Dept: PEDIATRICS | Facility: CLINIC | Age: 80
End: 2020-01-03

## 2020-01-03 DIAGNOSIS — Z95.2 S/P MITRAL VALVE REPLACEMENT: ICD-10-CM

## 2020-01-03 DIAGNOSIS — I48.91 AF (ATRIAL FIBRILLATION) (H): ICD-10-CM

## 2020-01-03 DIAGNOSIS — Z79.01 LONG TERM CURRENT USE OF ANTICOAGULANT THERAPY: ICD-10-CM

## 2020-01-03 LAB — INR PPP: 2.7 (ref 0.9–1.1)

## 2020-01-03 NOTE — TELEPHONE ENCOUNTER
01/03/20 Records of OV from [facility] 01/03/20 and 12/19/19 received. Sent to HIM for scanning. Kylie 01/03/20 445 pm

## 2020-01-06 ENCOUNTER — TELEPHONE (OUTPATIENT)
Dept: CARDIOLOGY | Facility: CLINIC | Age: 80
End: 2020-01-06

## 2020-01-06 NOTE — TELEPHONE ENCOUNTER
When I saw him he was having episodes of lightheadedness with position changes.   His monitor showed no bradycardia.       We know he will have some high heart rates.  The highest was 140 bpm.  As long as his high heart rates don't last for a week or longer.        I don't want him to take metoprolol or any other AV node blocker as he had heart block while hospitalized.   He might benefit from a pulse oximeter to assess his heart rates.        Anabell

## 2020-01-06 NOTE — TELEPHONE ENCOUNTER
Patient called reporting he feels his episodes of lightheadedness has become more frequent since he has been off his metoprolol.  Reports he had an episode on 1/3 lasting approximately 10 minutes where he felt like he was going to fall to the ground.  His walker did prevent this from happening.  Also became nauseated and reports he vomited small amount.  .patient wanting to resume his metoprolol.  Explained to patient that the medication was stopped d/t-second degree AV block, likely mobitz II-    Patient does not monitor BP or HR when he is having symptoms.  Does report he does feel his heart race when he has the lightheadedness.  Inform patient that I would update Anabell Xiao, AURELIA in Dr Pedro bautista for recommendations. JUSTIN Vargas

## 2020-01-07 NOTE — TELEPHONE ENCOUNTER
Spoke to patient regarding recommendations per Anabell Xiao, AURELIA.    ---no metoprolol  ---continues to monitor symptoms and log.  It would be beneficial to get pulse oximeter to assess HR  Also discussed with patient importance of keeping self hydrated, limiting use of narcotics and muscle relaxants as this can alter mobility and cognition and if having episodes to remain seated and safe.  Patient reports he had an episode of lightheadedness this morning after eating his breakfast.  Described it as mild lasting 3-4 minutes.  Patient provided verbal understanding regarding above.  Asked patient to call in one week with update or sooner if needed.  JUSTIN Vargas

## 2020-01-10 ENCOUNTER — OFFICE VISIT (OUTPATIENT)
Dept: SLEEP MEDICINE | Facility: CLINIC | Age: 80
End: 2020-01-10
Payer: MEDICARE

## 2020-01-10 VITALS
BODY MASS INDEX: 33.66 KG/M2 | OXYGEN SATURATION: 99 % | RESPIRATION RATE: 16 BRPM | SYSTOLIC BLOOD PRESSURE: 125 MMHG | HEART RATE: 71 BPM | HEIGHT: 65 IN | WEIGHT: 202 LBS | DIASTOLIC BLOOD PRESSURE: 56 MMHG

## 2020-01-10 DIAGNOSIS — G47.33 OSA (OBSTRUCTIVE SLEEP APNEA): Primary | ICD-10-CM

## 2020-01-10 PROCEDURE — 99214 OFFICE O/P EST MOD 30 MIN: CPT | Performed by: INTERNAL MEDICINE

## 2020-01-10 ASSESSMENT — MIFFLIN-ST. JEOR: SCORE: 1563.15

## 2020-01-10 NOTE — PROGRESS NOTES
[facility]  Outpatient Sleep Medicine Consultation  January 10, 2020      Name: Fausto Farr MRN# [id number]   Age: 79 year old YOB: 1940     Date of Consultation: January 10, 2020  Consultation is requested by: No referring provider defined for this encounter.  Primary care provider: Jodi Flower Mai  Usually         Assessment and Plan:       Summary Diagnoses:      Moderate obstructive sleep apnea  With hypoxemia effectively treated with CPAP with excellent adherence    Interruption in CPAP adherence due to surgical procedures and care    Status post coronary artery bypass graft, mitral valve replacement; atrial fibrillation    Summary Recommendations:      Continue CPAP    RTC 1 year    Summary Counseling: Counseled in rationale for treating moderate sleep apnea with hypoxemia in the setting of cardiovascular disease, use of CPAP care of equipment and replacement of masks.           History of Present Illness:     Fausto Farr is a 79 year old male who has had a lapse in adherence to CPAP due to multiple medical and surgical problems requiring hospitalization and home management and difficulties with sleeping secondary to pain.  Currently his disease is very well controlled without interface intolerance and with no significant sleep apnea.  He has no difficulty initiating or maintaining sleep and notes that he is benefited from improvement in alertness and sleep consolidation.    PREVIOUS SLEEP STUDIES:  Date: August 9, 2016  AHI: 18.8 was 11.3 minutes of hypoxemia    Auto titration CPAP 6-16 compliance:  Dates: December 10, 2019 to January 8, 2020        93 % >4hour use       Average use 6.6 hours  Leak 95th percentile 37.5  Residual AHI 1 .2         Medications:     Current Outpatient Medications   Medication Sig     acetaminophen (TYLENOL) 650 MG CR tablet Take 650 mg by mouth every 8 hours as needed for mild pain or fever     betamethasone valerate  (VALISONE) 0.1 % external lotion Apply topically 2 times daily Apply topically to scalp twice daily PRN     cholecalciferol 25 MCG (1000 UT) TABS Take 1,000 Units by mouth daily     ezetimibe (ZETIA) 10 MG tablet Take 1 tablet (10 mg) by mouth daily     ferrous sulfate (FEROSUL) 325 (65 Fe) MG tablet Take 325 mg by mouth daily (with breakfast)     fluocinonide (LIDEX) 0.05 % external ointment Apply topically 2 times daily as needed     furosemide (LASIX) 40 MG tablet Take 0.5 tablets (20 mg) by mouth daily     glucosamine-chondroitin 500-400 MG CAPS per capsule Take 1 capsule by mouth 2 times daily     HYDROcodone-acetaminophen (NORCO) 5-325 MG tablet Take 1-2 tablets by mouth every 6 hours as needed for severe pain     mesalamine (ASACOL HD) 800 MG EC tablet Take 1 tablet (800 mg) by mouth 2 times daily     methocarbamol (ROBAXIN) 750 MG tablet Take 1 tablet (750 mg) by mouth every 6 hours as needed for muscle spasms     rosuvastatin (CRESTOR) 40 MG tablet Take 1 tablet (40 mg) by mouth daily     senna-docusate (SENOKOT-S/PERICOLACE) 8.6-50 MG tablet Take 1-2 tablets by mouth 2 times daily as needed for constipation (Patient not taking: Reported on 12/26/2019)     tamsulosin (FLOMAX) 0.4 MG capsule TAKE 1 CAPSULE BY MOUTH EVERY DAY     warfarin ANTICOAGULANT (COUMADIN) 5 MG tablet Take 1 tablet (5 mg) by mouth daily 5 mg  every Mon, Wed, Fri; 7.5 mg (5 mg x 1.5) all other days or as instructed by INR nurse     warfarin ANTICOAGULANT (COUMADIN) 5 MG tablet Take 7.5 mg by mouth daily On Sundays, Tuesdays, Thursdays, and Saturdays     No current facility-administered medications for this visit.         Allergies   Allergen Reactions     Bees Anaphylaxis            Past Medical History:     Does not need 02 supplement at night   Past Medical History:   Diagnosis Date     Abdominal pain      Abnormal ECG     RBBB, 1st degree AVB, Left axis deviation     Anemia     currently taking iron     Arrhythmia     pac, pvc      Back pain     since 1980     BPH (benign prostatic hyperplasia)      Bruit      CAD (coronary artery disease)      Cellulitis 10/18/12     Cellulitis 05/2018    GrpB strep LLE cellulitis  negative RACHAEL for veg     Chronic venous insufficiency     bilat lower extremities     Contact dermatitis and other eczema, due to unspecified cause      Diaphragmatic hernia without mention of obstruction or gangrene      Diastolic HF (heart failure) (H)      Gastric ulcer      Glucose intolerance (impaired glucose tolerance)      Heart murmur 9/16/13    valvular heart disease     Hyperlipidemia LDL goal <100 8/6/2013     Hypertension 8/6/13     Lumbago      Malaise and fatigue      Metabolic syndrome      Mobitz (type) I (Wenckebach's) atrioventricular block     and RBBB     Nocturia 10/18/12     Nonallopathic lesion of cervical region      Nonallopathic lesion of lumbar region      Nonallopathic lesion of pelvic region, not elsewhere classified      Nonallopathic lesion of rib cage      Nonallopathic lesion of sacral region      GAGE (obstructive sleep apnea)     CPAP     Paroxysmal atrial fibrillation (H) 10/18/12     Prostate cancer (H) 2008    radiation seed, XRT      PVD (peripheral vascular disease) (H)      RBBB     1st degree AVB, RBBB, LAHB     Rotator cuff strain     and sprain     S/P AAA repair      S/P aortic valve replacement 2006    developed perivalve leak and MS, therefore redo surg 2013     S/P CABG (coronary artery bypass graft) 2006    Lima-Lad, RA-Rca     Sciatica of left side     since 2000     Sepsis due to group B Streptococcus (H) 5/19/2018     Ulcerative colitis (H)      Varicose veins of bilateral lower extremities with other complications     s/p RLE vein stripping     Vitamin D deficiency              Past Surgical History:    No h/o  upper airway surgery  Past Surgical History:   Procedure Laterality Date     AORTIC VALVE REPLACEMENT  1/3/06    redo AVR SJM 21mm and SJM MVR 27mm in 2013SJM 21(AGFN  756):AVR, SJM 27 :MVR-     APPLY WOUND VAC N/A 11/12/2019    Procedure: APPLICATION, WOUND VAC;  Surgeon: Sara Martinez MD;  Location:  OR     ARTHROPLASTY KNEE      right knee     ARTHROPLASTY KNEE Right 7/22/2019    Procedure: Right total knee arthroplasty;  Surgeon: Prasanth Jensen MD;  Location:  OR     BACK SURGERY  Oct 2015    Fusion L4-5, laminectomy L2, L3     BYPASS GRAFT ARTERY CORONARY  10/2013    reimplantation of radial artery graft to RCA     C CABG, VEIN, TWO  1/3/06    Left radial to RCA, LIMA to LAD (RA to RCA reimplanted at time of 2013 surg)     CARDIAC CATHERIZATION  11/2005    Stent placed to RCA     CARDIAC CATHERIZATION  04/2013    Occluded RCA, patent LIMA to LAD and radial graft to PDA     CARPAL TUNNEL RELEASE RT/LT  1994     COLONOSCOPY  8-22-11     CYSTOSCOPY FLEXIBLE  10/16/2013    Procedure: CYSTOSCOPY FLEXIBLE;  FLEXIBLE CYSTOSCOPY / DILATION OF URETHRA / INSERTION OF LESLIE;  Surgeon: Cooper Wallace MD;  Location:  OR     ENDOVASCULAR REPAIR, INFRARENAL ABDOMINAL AORTIC ANEURYSM/DISSECTION; MODULAR BIFURCATED PROSTHESIS  2006    AAA repair endovascular     ENT SURGERY       EP ABLATION ATRIAL FLUTTER N/A 4/22/2019    Procedure: EP Ablation Atrial Flutter;  Surgeon: Jessy Vasquez MD;  Location:  HEART CARDIAC CATH LAB     GENITOURINARY SURGERY  6/16/08    radioactive seeding     GRAFT FLAP PEDICLE EXTREMITY (LOCATION) Right 11/12/2019    Procedure: SURGICAL PROCUREMENT, FLAP, PEDICLE, EXTREMITY;  Surgeon: Sara Martinez MD;  Location:  OR     GRAFT SKIN SPLIT THICKNESS FROM EXTREMITY Right 11/12/2019    Procedure: RIGHT GASTRONEMIUS FLAP TO RIGHT KNEE, SPLIT THICKNESS SKIN GRAFT FROM RIGHT THIGH TO RIGHT KNEE, SURGICAL PROCUREMENT, FLAP, PEDICLE, EXTREMITY, WOUND VAC PLACEMENT;  Surgeon: Sara Martinez MD;  Location:  OR     HEAD & NECK SURGERY  1997    vocal cord polypectomy     INCISION AND DRAINAGE KNEE, COMBINED  Right 8/29/2019    Procedure: INCISION AND DRAINAGE, KNEE W/ Secondary Wound Closure;  Surgeon: Prasanth Jensen MD;  Location: [facility] OR     IRRIGATION AND DEBRIDEMENT LOWER EXTREMITY, COMBINED Right 12/8/2019    Procedure: DEBRIDEMENT OF RIGHT CALF AND WOUND CLOSURE.;  Surgeon: Sara Martinez MD;  Location:  OR     KNEE SURGERY  2001 Right knee arthroscopy     OPTICAL TRACKING SYSTEM FUSION SPINE POSTERIOR LUMBAR THREE+ LEVELS N/A 10/29/2015    Procedure: OPTICAL TRACKING SYSTEM FUSION SPINE POSTERIOR LUMBAR THREE+ LEVELS;  Surgeon: Walt Garcia MD;  Location:  OR     PROSTATE SURGERY      radioactive seeding 6/16/08     REPAIR ANEURYSM ABDOMINAL AORTA  06/08     REPAIR VALVE MITRAL  10/16/2013    SJM 21(AGFN 756):AVR, SJM 27 :MVRProcedure: REPAIR VALVE MITRAL;  REDO STERNOTOMY/REDO AORTIC VALVE REPLACEMENT/ MITRAL VALVE REPLACEMENT/REIMPLANTATION OF RIGHT CORONARY ARTERY BYPASS WITH RACHAEL ( ON PUMP);  Surgeon: Viet Singh MD;  Location:  OR     REPLACE VALVE AORTIC  10/16/2013    Procedure: REPLACE VALVE AORTIC;;  Surgeon: Viet Singh MD;  Location:  OR     SURGERY GENERAL IP CONSULT  05/2008 Excision aneurysm abdominal aorta     SURGERY GENERAL IP CONSULT  1997 Vocal cord polypectomy     VASCULAR SURGERY  1968, 1993     varicose vein stripping                      Physical Examination:   There were no vitals taken for this visit.             Copy to: Jodi Flower Mai, MD 1/10/2020     [facility]   Floor 1, [address]   [address] [place], [place] [address] [address]   Appointments: [phone]    [facility]  3rd Floor  [address] [place] [address]     Total time spent with patient: 25 min >50% counseling

## 2020-01-10 NOTE — PATIENT INSTRUCTIONS
Your BMI is Body mass index is 33.61 kg/m .  Weight management is a personal decision.  If you are interested in exploring weight loss strategies, the following discussion covers the approaches that may be successful. Body mass index (BMI) is one way to tell whether you are at a healthy weight, overweight, or obese. It measures your weight in relation to your height.  A BMI of 18.5 to 24.9 is in the healthy range. A person with a BMI of 25 to 29.9 is considered overweight, and someone with a BMI of 30 or greater is considered obese. More than two-thirds of American adults are considered overweight or obese.  Being overweight or obese increases the risk for further weight gain. Excess weight may lead to heart disease and diabetes.  Creating and following plans for healthy eating and physical activity may help you improve your health.  Weight control is part of healthy lifestyle and includes exercise, emotional health, and healthy eating habits. Careful eating habits lifelong are the mainstay of weight control. Though there are significant health benefits from weight loss, long-term weight loss with diet alone may be very difficult to achieve- studies show long-term success with dietary management in less than 10% of people. Attaining a healthy weight may be especially difficult to achieve in those with severe obesity. In some cases, medications, devices and surgical management might be considered.  What can you do?  If you are overweight or obese and are interested in methods for weight loss, you should discuss this with your provider.     Consider reducing daily calorie intake by 500 calories.     Keep a food journal.     Avoiding skipping meals, consider cutting portions instead.    Diet combined with exercise helps maintain muscle while optimizing fat loss. Strength training is particularly important for building and maintaining muscle mass. Exercise helps reduce stress, increase energy, and improves fitness.  Increasing exercise without diet control, however, may not burn enough calories to loose weight.       Start walking three days a week 10-20 minutes at a time    Work towards walking thirty minutes five days a week     Eventually, increase the speed of your walking for 1-2 minutes at time    In addition, we recommend that you review healthy lifestyles and methods for weight loss available through the National Institutes of Health patient information sites:  http://win.niddk.nih.gov/publications/index.htm    And look into health and wellness programs that may be available through your health insurance provider, employer, local community center, or dominick club.    Weight management plan: Patient was referred to their PCP to discuss a diet and exercise plan.

## 2020-01-10 NOTE — NURSING NOTE
"Chief Complaint   Patient presents with     RECHECK     Follow up rebecca       Initial /56   Pulse 71   Resp 16   Ht 1.651 m (5' 5\")   Wt 91.6 kg (202 lb)   SpO2 99%   BMI 33.61 kg/m   Estimated body mass index is 33.61 kg/m  as calculated from the following:    Height as of this encounter: 1.651 m (5' 5\").    Weight as of this encounter: 91.6 kg (202 lb).    Medication Reconciliation: complete    ESS 5    Xuan Mayer MA      "

## 2020-01-14 ENCOUNTER — TELEPHONE (OUTPATIENT)
Dept: PEDIATRICS | Facility: CLINIC | Age: 80
End: 2020-01-14

## 2020-01-14 DIAGNOSIS — I48.91 AF (ATRIAL FIBRILLATION) (H): ICD-10-CM

## 2020-01-14 DIAGNOSIS — Z95.2 S/P MITRAL VALVE REPLACEMENT: ICD-10-CM

## 2020-01-14 DIAGNOSIS — Z79.01 LONG TERM CURRENT USE OF ANTICOAGULANT THERAPY: ICD-10-CM

## 2020-01-14 LAB — INR PPP: 2.9 (ref 0.9–1.1)

## 2020-01-14 NOTE — TELEPHONE ENCOUNTER
ANTICOAGULATION MANAGEMENT     Patient Name:  Fausto Farr  Date:  2020    ASSESSMENT /SUBJECTIVE:      Today's INR result of 2.9 is therapeutic. Goal INR of 2.5-3.5      Warfarin dose taken: Warfarin taken as previously instructed    Diet: No new diet changes affecting INR    Medication changes/ interactions: No new medications/supplements affecting INR    Previous INR: Therapeutic     S/S of bleeding or thromboembolism: No    New injury or illness:  No    Upcoming surgery, procedure or cardioversion:  No    Additional findings: None      PLAN:    Spoke with Swetha ([facility] RN) regarding INR result and instructed:     Warfarin Dosing Instructions: Continue your current warfarin dose of 5mg Mon/Wed/Fri; 7.5mg all other days    Instructed patient to follow up no later than: 2 weeks    Education provided: No      Swetha verbalizes understanding and agrees to warfarin dosing plan.    Instructed to call the Anticoagulation Clinic for any changes, questions or concerns. (#[phone])        OBJECTIVE:  INR   Date Value Ref Range Status   2020 2.9 (A) 0.90 - 1.10 Final             Anticoagulation Summary  As of 2020    INR goal:   2.5-3.5   TTR:   75.5 % (11.2 mo)   INR used for dosin.9 (2020)   Warfarin maintenance plan:   5 mg (5 mg x 1) every Mon, Wed, Fri; 7.5 mg (5 mg x 1.5) all other days   Full warfarin instructions:   5 mg every Mon, Wed, Fri; 7.5 mg all other days   Weekly warfarin total:   45 mg   Plan last modified:   Diane Delcid RN (2019)   Next INR check:   2020   Priority:   High   Target end date:   Indefinite    Indications    AF (atrial fibrillation) (H) [I48.91]  S/P mitral valve replacement [Z95.2]  Long term current use of anticoagulant therapy [Z79.01]             Anticoagulation Episode Summary     INR check location:       Preferred lab:       Send INR reminders to:   SHANNA DOWELL    Comments:   5mg tabs - andrade dose // transfer from  [facility] // [facility] // CALENDAR // right knee replaced 7/22/19      Anticoagulation Care Providers     Provider Role Specialty Phone number    Jodi Flower Mai, MD  Internal Medicine [phone]

## 2020-01-28 ENCOUNTER — TELEPHONE (OUTPATIENT)
Dept: PEDIATRICS | Facility: CLINIC | Age: 80
End: 2020-01-28

## 2020-01-28 DIAGNOSIS — I48.91 AF (ATRIAL FIBRILLATION) (H): ICD-10-CM

## 2020-01-28 DIAGNOSIS — Z79.01 LONG TERM CURRENT USE OF ANTICOAGULANT THERAPY: ICD-10-CM

## 2020-01-28 DIAGNOSIS — Z95.2 S/P MITRAL VALVE REPLACEMENT: ICD-10-CM

## 2020-01-28 LAB — INR PPP: 3.4 (ref 0.9–1.1)

## 2020-01-28 NOTE — TELEPHONE ENCOUNTER
ANTICOAGULATION MANAGEMENT     Patient Name:  Fausto Farr  Date:  1/28/2020    ASSESSMENT /SUBJECTIVE:      Today's INR result of 3.4 is therapeutic. Goal INR of 2.5-3.5      Warfarin dose taken: Warfarin taken as previously instructed    Diet: No new diet changes affecting INR    Medication changes/ interactions: No new medications/supplements affecting INR    Previous INR: Therapeutic     S/S of bleeding or thromboembolism: No    New injury or illness:  No    Upcoming surgery, procedure or cardioversion:  No    Additional findings: None      PLAN:    Spoke with SAVANNA Padgett regarding INR result and instructed:     Warfarin Dosing Instructions: Continue your current warfarin dose    Instructed patient to follow up no later than: 2 weeks  Orders given to  Homecare nurse/facility to recheck    Education provided: Yes: Watch for increased signs of bruising or bleeding and if noted to be seen right away      Nurse Sara verbalizes understanding and agrees to warfarin dosing plan.    Instructed to call the Anticoagulation Clinic for any changes, questions or concerns. (#[phone])        OBJECTIVE:  INR   Date Value Ref Range Status   01/28/2020 3.4 (A) 0.90 - 1.10 Final             Anticoagulation Summary  As of 1/28/2020    INR goal:   2.5-3.5   TTR:   75.5 % (11.2 mo)   INR used for dosing:   3.4 (1/28/2020)   Warfarin maintenance plan:   5 mg (5 mg x 1) every Mon, Wed, Fri; 7.5 mg (5 mg x 1.5) all other days   Full warfarin instructions:   5 mg every Mon, Wed, Fri; 7.5 mg all other days   Weekly warfarin total:   45 mg   No change documented:   Aline Beal RN   Plan last modified:   Diane Delcid RN (11/26/2019)   Next INR check:   2/11/2020   Priority:   High   Target end date:   Indefinite    Indications    AF (atrial fibrillation) (H) [I48.91]  S/P mitral valve replacement [Z95.2]  Long term current use of anticoagulant therapy [Z79.01]             Anticoagulation Episode Summary      INR check location:       Preferred lab:   EXTERNAL LAB    Send INR reminders to:   SHANNA PERRY    Comments:   5mg tabs - andrade dose // transfer from [facility] // [facility] // CALENDAR // right knee replaced 7/22/19 //Home care      Anticoagulation Care Providers     Provider Role Specialty Phone number    Jodi Flower Mai, MD  Internal Medicine [phone]

## 2020-01-30 DIAGNOSIS — Z53.9 DIAGNOSIS NOT YET DEFINED: Primary | ICD-10-CM

## 2020-01-30 PROCEDURE — G0179 MD RECERTIFICATION HHA PT: HCPCS | Performed by: INTERNAL MEDICINE

## 2020-01-31 ENCOUNTER — TRANSFERRED RECORDS (OUTPATIENT)
Dept: HEALTH INFORMATION MANAGEMENT | Facility: CLINIC | Age: 80
End: 2020-01-31

## 2020-02-03 ENCOUNTER — HOSPITAL LABORATORY (OUTPATIENT)
Dept: OTHER | Facility: CLINIC | Age: 80
End: 2020-02-03

## 2020-02-03 ENCOUNTER — TRANSFERRED RECORDS (OUTPATIENT)
Dept: HEALTH INFORMATION MANAGEMENT | Facility: CLINIC | Age: 80
End: 2020-02-03

## 2020-02-04 LAB
GRAM STN SPEC: ABNORMAL
SPECIMEN SOURCE: ABNORMAL

## 2020-02-06 ENCOUNTER — TRANSFERRED RECORDS (OUTPATIENT)
Dept: HEALTH INFORMATION MANAGEMENT | Facility: CLINIC | Age: 80
End: 2020-02-06

## 2020-02-07 ENCOUNTER — ANTICOAGULATION THERAPY VISIT (OUTPATIENT)
Dept: NURSING | Facility: CLINIC | Age: 80
End: 2020-02-07
Payer: MEDICARE

## 2020-02-07 ENCOUNTER — OFFICE VISIT (OUTPATIENT)
Dept: PEDIATRICS | Facility: CLINIC | Age: 80
End: 2020-02-07
Payer: MEDICARE

## 2020-02-07 ENCOUNTER — TELEPHONE (OUTPATIENT)
Dept: PEDIATRICS | Facility: CLINIC | Age: 80
End: 2020-02-07

## 2020-02-07 ENCOUNTER — TELEPHONE (OUTPATIENT)
Dept: CARDIOLOGY | Facility: CLINIC | Age: 80
End: 2020-02-07

## 2020-02-07 VITALS
OXYGEN SATURATION: 98 % | DIASTOLIC BLOOD PRESSURE: 60 MMHG | TEMPERATURE: 98.8 F | HEIGHT: 65 IN | SYSTOLIC BLOOD PRESSURE: 100 MMHG | WEIGHT: 198.9 LBS | BODY MASS INDEX: 33.14 KG/M2 | HEART RATE: 90 BPM

## 2020-02-07 DIAGNOSIS — Z95.2 S/P MITRAL VALVE REPLACEMENT: ICD-10-CM

## 2020-02-07 DIAGNOSIS — I48.91 AF (ATRIAL FIBRILLATION) (H): ICD-10-CM

## 2020-02-07 DIAGNOSIS — Z79.01 LONG TERM CURRENT USE OF ANTICOAGULANT THERAPY: Primary | ICD-10-CM

## 2020-02-07 DIAGNOSIS — Z01.818 PREOP GENERAL PHYSICAL EXAM: Primary | ICD-10-CM

## 2020-02-07 LAB
BACTERIA SPEC CULT: ABNORMAL
INR POINT OF CARE: 2.2 (ref 0.86–1.14)
SPECIMEN SOURCE: ABNORMAL

## 2020-02-07 PROCEDURE — 99215 OFFICE O/P EST HI 40 MIN: CPT | Performed by: INTERNAL MEDICINE

## 2020-02-07 PROCEDURE — 85610 PROTHROMBIN TIME: CPT | Mod: QW

## 2020-02-07 PROCEDURE — 36416 COLLJ CAPILLARY BLOOD SPEC: CPT

## 2020-02-07 RX ORDER — CEFADROXIL 500 MG/1
500 CAPSULE ORAL 2 TIMES DAILY
Status: ON HOLD | COMMUNITY
Start: 2020-02-03 | End: 2020-02-15

## 2020-02-07 ASSESSMENT — MIFFLIN-ST. JEOR: SCORE: 1549.08

## 2020-02-07 NOTE — PROGRESS NOTES
[facility] [place]  [address]  [address]  [place] [address]  [phone]  Dept: [phone]    PRE-OP EVALUATION:  Today's date: 2020    Fausto Farr (: 1940) presents for pre-operative evaluation assessment as requested by Dr. Franco.  He requires evaluation and anesthesia risk assessment prior to undergoing surgery/procedure for debridement of infection around right knee .    Proposed Surgery/ Procedure: Right knee debridement and antibiotic bead placement  Date of Surgery/ Procedure: 20 - going in 2 days early (2/10/20)  Time of Surgery/ Procedure: [facility]/Surgical Facility: [facility]   Primary Physician: Jodi Flower Mai  Type of Anesthesia Anticipated: General    Patient has a Health Care Directive or Living Will:  YES in epic     1. Yes  - Do you have a history of heart attack, stroke, stent, bypass or surgery on an artery in the head, neck, heart or legs?  2. NO - Do you ever have any pain or discomfort in your chest?  3. Yes  - Do you have a history of  Heart Failure?  4. Yes  - Are you troubled by shortness of breath when: walking on the level, up a slight hill or at night?    5. NO - Do you currently have a cold, bronchitis or other respiratory infection?  6. NO - Do you have a cough, shortness of breath or wheezing?  7. NO - Do you sometimes get pains in the calves of your legs when you walk?  8. NO - Do you or anyone in your family have previous history of blood clots?  9. NO - Do you or does anyone in your family have a serious bleeding problem such as prolonged bleeding following surgeries or cuts?  10. Yes  - Have you ever had problems with anemia or been told to take iron pills?  On iron supplements.  11. NO - Have you had any abnormal blood loss such as black, tarry or bloody stools, or abnormal vaginal bleeding?  12. NO - Have you ever had a blood transfusion?  13. NO - Have you or any of your relatives ever had problems  with anesthesia?  14. Yes  - Do you have sleep apnea, excessive snoring or daytime drowsiness? GAGE on CPAP  15. Yes  - Do you have any prosthetic heart valves? Mitral, Aortic  16. Yes right knee - Do you have prosthetic joints?  17. NO - Is there any chance that you may be pregnant?      HPI:     HPI related to upcoming procedure: Patient with history of right TKA in July 2019.  Post op course complicated by poor wound healing and soft tissue infection.  Now scheduled for further debridement and antibiotic bead placement.    A-FIB - Patient has a longstanding history of chronic A-fib/flutter currently on no medication.  S/P ablation for control. Current treatment regimen includes Warfarin for stroke prevention and has had symptoms of lightheadedness, palpitations or dyspnea. B blocker recently discontinued.  Followed by cardiology.  Awaiting pacemaker placement.    ANEMIA - Patient has a recent history of moderate anemia, which has not been symptomatic. Work up to date has revealed probably multifactorial cause. Treatment has been iron supplement.     CAD - Patient has a longstanding history of moderate CAD, s/p CABG. Patient denies recent chest pain or NTG use, Does have exercise induced dyspnea, but unchanged over the past year.  Denies PND or orthopnea.  Last Stress test 4/18/19 NM stress test: Impression  1.  Myocardial perfusion imaging using single isotope technique  demonstrated fixed inferior inferior septal defect and a second fixed  apical defect. There is no significant reversibility on this study to  suggest ischemia.   2. Gated images demonstrated normal LV cavity size and systolic  function.  The left ventricular systolic function is 59%.  3. Compared to the prior study from no prior study  , EKG 11/10/19. Sinus rhythm with A-V dissociation and Wide QRS rhythm with occasional Premature ventricular complexes  Left axis deviation  Right bundle branch block  Inferior infarct , age undetermined  Abnormal  ECG  When compared with ECG of 12-NOV-2019 15:37,  Previous ECG has undetermined rhythm, needs review    CHF - Patient has a longstanding history of moderate CHF. Exacerbating conditions include dystolic dysfunction. Currently the patient's condition is same. Current treatment regimen includes diuretic. The patient denies chest pain, edema, orthopnea, SOB or recent weight gain. Last Echocardiogram ECHO (5/2018) revealed EF 55%.  right ventricle is mildly dilated. Mildly decreased right ventricular systolic function,  Mechanical mitral valve. 27mm St Solo. Both leaflets open well, mean gradient 4-5mmHg (normal). Physiologic MR. No vegetations, There is a mechanical aortic valve. 21mm St Solo. Views are limited of the valve leaflets. Mean gradient 11mmHg. No AI. Probably no vegetations     HYPERLIPIDEMIA - Patient has a long history of significant Hyperlipidemia requiring medication for treatment with recent good control. Patient reports no problems or side effects with the medication.     Ulcerative colitis:  Has been stable on Asacol.    MEDICAL HISTORY:     Patient Active Problem List    Diagnosis Date Noted     Sepsis due to group B Streptococcus (H) 05/19/2018     Priority: High     Open wound of right knee 11/10/2019     Priority: Medium     Total knee replacement status 07/22/2019     Priority: Medium     Knee pain, chronic 07/19/2019     Priority: Medium     Obesity (BMI 35.0-39.9) with comorbidity (H) 05/30/2019     Priority: Medium     Typical atrial flutter (H) 04/15/2019     Priority: Medium     Added automatically from request for surgery [id number]       GAGE (obstructive sleep apnea) 10/02/2017     Priority: Medium     Long term current use of anticoagulant therapy 03/21/2016     Priority: Medium     Chronic systolic congestive heart failure (H) 03/21/2016     Priority: Medium     S/P lumbar spinal fusion 10/29/2015     Priority: Medium     Personal history of tobacco use, presenting hazards to health  10/28/2015     Priority: Medium     Quit 2002       Spinal stenosis of lumbar region without neurogenic claudication 10/28/2015     Priority: Medium     S/P mitral valve replacement 10/28/2015     Priority: Medium     RBBB with left anterior fascicular block 10/28/2015     Priority: Medium     S/P aortic valve replacement      Priority: Medium     developed perivalve leak and MS, therefore redo surg 2013       S/P CABG (coronary artery bypass graft)      Priority: Medium     with AVR Aguirre-Lad, RA-Rca       Stented coronary artery      Priority: Medium     Mixed hyperlipidemia      Priority: Medium     Diagnosis updated by automated process. Provider to review and confirm.       PVD (peripheral vascular disease) (H)      Priority: Medium     Abnormal ECG      Priority: Medium     RBBB, 1st degree AVB, Left axis deviation       ACP (advance care planning) 10/09/2015     Priority: Medium     Advance Care Planning 10/9/2015: Receipt of ACP document:  Received: Health Care Directive which was witnessed or notarized on 9-18-15.  Document not previously scanned.  Validation form completed and sent with document to be scanned.  Code Status reflects choices in most recent ACP document.  Confirmed/documented designated decision maker(s).  Added by Aster Rios RN Advance Care Planning Liaison with Willa Khan  Advance Care Planning 10/9/2015: ACP Review and Resources Provided:  Reviewed chart for advance care plan.  Fausto Farr has no plan or code status on file however states presence of ACP document. Copy requested. Confirmed code status reflects current choices pending receipt of document/advance care plan review. Confirmed/documented legally designated decision maker(s). Added by Aster Rios RN Advance Care Planning Liaison with Willa Khan           Urinary retention 12/04/2013     Priority: Medium     Health Care Home 10/24/2013     Priority: Medium     Status:  Closed  Care Coordinator:  Susan  Gerardo ANDERSON [facility]    See Letters for [facility] Care Plan         MRSA (methicillin resistant Staphylococcus aureus) 10/17/2013     Priority: Medium     Nares 10-16-13       Heart murmur      Priority: Medium     Valvular heart disease 09/16/2013     Priority: Medium     Mitral and Aortic Valve       Vitamin D deficiency disease 08/06/2013     Priority: Medium     Anemia relative at 12.5 in 8-13  08/06/2013     Priority: Medium     Essential hypertension, benign 08/06/2013     Priority: Medium     Hyperlipidemia LDL goal <100 08/06/2013     Priority: Medium     Prostate cancer (H) 08/06/2013     Priority: Medium     Glucose intolerance (impaired glucose tolerance) 08/06/2013     Priority: Medium     Sciatica of left side since 2000 08/06/2013     Priority: Medium     Somatic dysfunction of pelvic region 10/18/2012     Priority: Medium     Problem list name updated by automated process. Provider to review       Contact dermatitis and other eczema, due to unspecified cause 10/18/2012     Priority: Medium     Ulcerative colitis (H) 10/18/2012     Priority: Medium     Problem list name updated by automated process. Provider to review       Atrial fibrillation (H) 10/18/2012     Priority: Medium     Other specified anemias 10/18/2012     Priority: Medium     Gastric ulcer 10/18/2012     Priority: Medium     Problem list name updated by automated process. Provider to review       Bilateral low back pain with left-sided sciatica 10/18/2012     Priority: Medium     Nonallopathic lesion of lumbar region 10/18/2012     Priority: Medium     Problem list name updated by automated process. Provider to review       Nonallopathic lesion of sacral region 10/18/2012     Priority: Medium     Problem list name updated by automated process. Provider to review       Diaphragmatic hernia 10/18/2012     Priority: Medium     Problem list name updated by automated process. Provider to review       Abdominal pain, epigastric 10/18/2012     Priority:  Medium     Malaise and fatigue 10/18/2012     Priority: Medium     Problem list name updated by automated process. Provider to review       Nocturia 10/18/2012     Priority: Medium     Nonallopathic lesion of cervical region 10/18/2012     Priority: Medium     Problem list name updated by automated process. Provider to review       Nonallopathic lesion of thoracic region 10/18/2012     Priority: Medium     Problem list name updated by automated process. Provider to review       Malignant neoplasm of prostate (H) 10/18/2012     Priority: Medium     Abdominal aortic aneurysm (H) 10/18/2012     Priority: Medium     6.5 cm 4/2015 u/s         Nonallopathic lesion of rib cage 10/18/2012     Priority: Medium     Problem list name updated by automated process. Provider to review       Coronary artery disease 10/18/2012     Priority: Medium     AF (atrial fibrillation) (H) 10/18/2012     Priority: Medium     Rotator cuff (capsule) sprain 05/10/2010     Priority: Medium      Past Medical History:   Diagnosis Date     Abdominal pain      Abnormal ECG     RBBB, 1st degree AVB, Left axis deviation     Anemia     currently taking iron     Arrhythmia     pac, pvc     Back pain     since 1980     BPH (benign prostatic hyperplasia)      Bruit      CAD (coronary artery disease)      Cellulitis 10/18/12     Cellulitis 05/2018    GrpB strep LLE cellulitis  negative RACHAEL for veg     Chronic venous insufficiency     bilat lower extremities     Contact dermatitis and other eczema, due to unspecified cause      Diaphragmatic hernia without mention of obstruction or gangrene      Diastolic HF (heart failure) (H)      Gastric ulcer      Glucose intolerance (impaired glucose tolerance)      Heart murmur 9/16/13    valvular heart disease     Hyperlipidemia LDL goal <100 8/6/2013     Hypertension 8/6/13     Lumbago      Malaise and fatigue      Metabolic syndrome      Mobitz (type) I (Wenckebach's) atrioventricular block     and RBBB      Nocturia 10/18/12     Nonallopathic lesion of cervical region      Nonallopathic lesion of lumbar region      Nonallopathic lesion of pelvic region, not elsewhere classified      Nonallopathic lesion of rib cage      Nonallopathic lesion of sacral region      GAGE (obstructive sleep apnea)     CPAP     Paroxysmal atrial fibrillation (H) 10/18/12     Prostate cancer (H) 2008    radiation seed, XRT      PVD (peripheral vascular disease) (H)      RBBB     1st degree AVB, RBBB, LAHB     Rotator cuff strain     and sprain     S/P AAA repair      S/P aortic valve replacement 2006    developed perivalve leak and MS, therefore redo surg 2013     S/P CABG (coronary artery bypass graft) 2006    Lima-Lad, RA-Rca     Sciatica of left side     since 2000     Sepsis due to group B Streptococcus (H) 5/19/2018     Ulcerative colitis (H)      Varicose veins of bilateral lower extremities with other complications     s/p RLE vein stripping     Vitamin D deficiency      Past Surgical History:   Procedure Laterality Date     AORTIC VALVE REPLACEMENT  1/3/06    redo AVR SJM 21mm and SJM MVR 27mm in 2013SJM 21([id number]):AVR, SJM 27 :MVR-     APPLY WOUND VAC N/A 11/12/2019    Procedure: APPLICATION, WOUND VAC;  Surgeon: Sara Martinez MD;  Location: [facility] OR     ARTHROPLASTY KNEE      right knee     ARTHROPLASTY KNEE Right 7/22/2019    Procedure: Right total knee arthroplasty;  Surgeon: Prasanth Jensen MD;  Location: [facility] OR     BACK SURGERY  Oct 2015    Fusion L4-5, laminectomy L2, L3     BYPASS GRAFT ARTERY CORONARY  10/2013    reimplantation of radial artery graft to RCA     C CABG, VEIN, TWO  1/3/06    Left radial to RCA, LIMA to LAD (RA to RCA reimplanted at time of 2013 surg)     CARDIAC CATHERIZATION  11/2005    Stent placed to RCA     CARDIAC CATHERIZATION  04/2013    Occluded RCA, patent LIMA to LAD and radial graft to PDA     CARPAL TUNNEL RELEASE RT/LT  1994     COLONOSCOPY  8-22-11     CYSTOSCOPY FLEXIBLE   10/16/2013    Procedure: CYSTOSCOPY FLEXIBLE;  FLEXIBLE CYSTOSCOPY / DILATION OF URETHRA / INSERTION OF LESLIE;  Surgeon: Cooper Wallace MD;  Location:  OR     ENDOVASCULAR REPAIR, INFRARENAL ABDOMINAL AORTIC ANEURYSM/DISSECTION; MODULAR BIFURCATED PROSTHESIS  2006    AAA repair endovascular     ENT SURGERY       EP ABLATION ATRIAL FLUTTER N/A 4/22/2019    Procedure: EP Ablation Atrial Flutter;  Surgeon: Jessy Vasquez MD;  Location:  [facility] CATH [facility]     GENITOURINARY SURGERY  6/16/08    radioactive seeding     GRAFT FLAP PEDICLE EXTREMITY (LOCATION) Right 11/12/2019    Procedure: SURGICAL PROCUREMENT, FLAP, PEDICLE, EXTREMITY;  Surgeon: Sara Martinez MD;  Location:  OR     GRAFT SKIN SPLIT THICKNESS FROM EXTREMITY Right 11/12/2019    Procedure: RIGHT GASTRONEMIUS FLAP TO RIGHT KNEE, SPLIT THICKNESS SKIN GRAFT FROM RIGHT THIGH TO RIGHT KNEE, SURGICAL PROCUREMENT, FLAP, PEDICLE, EXTREMITY, WOUND VAC PLACEMENT;  Surgeon: Sara Martinez MD;  Location:  OR     HEAD & NECK SURGERY  1997    vocal cord polypectomy     INCISION AND DRAINAGE KNEE, COMBINED Right 8/29/2019    Procedure: INCISION AND DRAINAGE, KNEE W/ Secondary Wound Closure;  Surgeon: Prasanth Jensen MD;  Location:  OR     IRRIGATION AND DEBRIDEMENT LOWER EXTREMITY, COMBINED Right 12/8/2019    Procedure: DEBRIDEMENT OF RIGHT CALF AND WOUND CLOSURE.;  Surgeon: Sara Martinez MD;  Location:  OR     KNEE SURGERY  2001 Right knee arthroscopy     OPTICAL TRACKING SYSTEM FUSION SPINE POSTERIOR LUMBAR THREE+ LEVELS N/A 10/29/2015    Procedure: OPTICAL TRACKING SYSTEM FUSION SPINE POSTERIOR LUMBAR THREE+ LEVELS;  Surgeon: Walt Garcia MD;  Location:  OR     PROSTATE SURGERY      radioactive seeding 6/16/08     REPAIR ANEURYSM ABDOMINAL AORTA  06/08     REPAIR VALVE MITRAL  10/16/2013    SJM 21([id number] [id number]):AVR, SJM 27 :MVRProcedure: REPAIR VALVE MITRAL;  REDO STERNOTOMY/REDO AORTIC VALVE  REPLACEMENT/ MITRAL VALVE REPLACEMENT/REIMPLANTATION OF RIGHT CORONARY ARTERY BYPASS WITH RACHAEL ( ON PUMP);  Surgeon: Viet Singh MD;  Location: [facility] OR     REPLACE VALVE AORTIC  10/16/2013    Procedure: REPLACE VALVE AORTIC;;  Surgeon: Viet Singh MD;  Location: [facility] OR     SURGERY GENERAL IP CONSULT  05/2008 Excision aneurysm abdominal aorta     SURGERY GENERAL IP CONSULT  1997 Vocal cord polypectomy     VASCULAR SURGERY  1968, 1993     varicose vein stripping     Current Outpatient Medications   Medication Sig Dispense Refill     acetaminophen (TYLENOL) 650 MG CR tablet Take 650 mg by mouth every 8 hours as needed for mild pain or fever       betamethasone valerate (VALISONE) 0.1 % external lotion Apply topically 2 times daily Apply topically to scalp twice daily PRN 60 mL 3     cholecalciferol 25 MCG (1000 UT) TABS Take 1,000 Units by mouth daily       ezetimibe (ZETIA) 10 MG tablet Take 1 tablet (10 mg) by mouth daily 90 tablet 3     ferrous sulfate (FEROSUL) 325 (65 Fe) MG tablet Take 325 mg by mouth daily (with breakfast)       fluocinonide (LIDEX) 0.05 % external ointment Apply topically 2 times daily as needed       furosemide (LASIX) 40 MG tablet Take 0.5 tablets (20 mg) by mouth daily 45 tablet 3     glucosamine-chondroitin 500-400 MG CAPS per capsule Take 1 capsule by mouth 2 times daily       HYDROcodone-acetaminophen (NORCO) 5-325 MG tablet Take 1-2 tablets by mouth every 6 hours as needed for severe pain 30 tablet 0     mesalamine (ASACOL HD) 800 MG EC tablet Take 1 tablet (800 mg) by mouth 2 times daily 180 tablet 11     methocarbamol (ROBAXIN) 750 MG tablet Take 1 tablet (750 mg) by mouth every 6 hours as needed for muscle spasms 30 tablet 0     rosuvastatin (CRESTOR) 40 MG tablet Take 1 tablet (40 mg) by mouth daily 90 tablet 3     senna-docusate (SENOKOT-S/PERICOLACE) 8.6-50 MG tablet Take 1-2 tablets by mouth 2 times daily as needed for constipation (Patient not taking:  Reported on 2019) 60 tablet 0     tamsulosin (FLOMAX) 0.4 MG capsule TAKE 1 CAPSULE BY MOUTH EVERY DAY 90 capsule 3     warfarin ANTICOAGULANT (COUMADIN) 5 MG tablet Take 1 tablet (5 mg) by mouth daily 5 mg  every Mon, Wed, Fri; 7.5 mg (5 mg x 1.5) all other days or as instructed by INR nurse 135 tablet 0     warfarin ANTICOAGULANT (COUMADIN) 5 MG tablet Take 7.5 mg by mouth daily On Sundays, , , and        OTC products: None, except as noted above    Allergies   Allergen Reactions     Bees Anaphylaxis      Latex Allergy: NO    Social History     Tobacco Use     Smoking status: Former Smoker     Packs/day: 1.00     Years: 40.00     Pack years: 40.00     Start date: 4/15/1962     Last attempt to quit: 10/23/2002     Years since quittin.3     Smokeless tobacco: Never Used   Substance Use Topics     Alcohol use: Yes     Frequency: 2-4 times a month     Drinks per session: 1 or 2     Comment: a couple beers per week (socially)     History   Drug Use No       REVIEW OF SYSTEMS:   CONSTITUTIONAL:NEGATIVE for fever, chills, and POSITIVE  for anorexia and weight loss  INTEGUMENTARY/SKIN: NEGATIVE for worrisome rashes, moles or lesions  EYES: NEGATIVE for vision changes or irritation  ENT/MOUTH: NEGATIVE for ear, mouth and throat problems  RESP: NEGATIVE for significant cough or SOB  BREAST: NEGATIVE for masses, tenderness or discharge  CV: NEGATIVE for chest pain, palpitations or peripheral edema  GI: NEGATIVE for nausea, abdominal pain, heartburn, or change in bowel habits  : NEGATIVE for frequency, dysuria, or hematuria  MUSCULOSKELETAL: NEGATIVE for significant arthralgias or myalgia  NEURO: NEGATIVE for weakness, or paresthesias and POSITIVE for dizziness/lightheadedness  ENDOCRINE: NEGATIVE for temperature intolerance, skin/hair changes  HEME: NEGATIVE for bleeding problems  PSYCHIATRIC: NEGATIVE for changes in mood or affect    EXAM:   /60 (BP Location: Right arm, Patient  "Position: Sitting)   Pulse 90   Temp 98.8  F (37.1  C) (Tympanic)   Ht 1.651 m (5' 5\")   Wt 90.2 kg (198 lb 14.4 oz)   SpO2 98%   BMI 33.10 kg/m      GENERAL APPEARANCE: alert and no distress     EYES: EOMI,  PERRL     HENT: ear canals and TM's normal and nose and mouth without ulcers or lesions     NECK: no adenopathy     RESP: lungs clear to auscultation - no rales, rhonchi or wheezes     CV: regular rates and rhythm, normal S1 S2, no S3 or S4 and mechanical heart  sounds.     ABDOMEN:  soft, nontender, no HSM or masses and bowel sounds normal     NEURO: Normal strength and tone, sensory exam grossly normal, mentation intact and speech normal     PSYCH: mentation appears normal. and affect normal/bright    DIAGNOSTICS:   EKG: as above    Recent Labs   Lab Test 01/28/20 01/14/20  12/10/19  0643 12/09/19  0752 12/08/19  0356  12/07/19  1640  11/15/19  0806  04/25/17  1244  10/19/13  0430   HGB  --   --   --  10.6* 10.7*  --    < > 10.8*   < > 10.6*   < > 12.9*   < > 8.0*   PLT  --   --   --  381 396  --    < > 396   < > 335   < > 282   < > 155   INR 3.4* 2.9*   < > 2.61* 1.72* 2.11*  --  2.22*   < > 1.23*   < >  --    < > 1.23*   NA  --   --   --   --   --   --   --  136  --  137   < > 139   < > 134   POTASSIUM  --   --   --   --   --   --   --  3.6  --  3.9   < > 4.5   < > 4.8   CR  --   --   --   --   --  0.99  --  0.96  --  0.85   < > 1.00   < > 1.22   A1C  --   --   --   --   --   --   --   --   --   --   --  5.9  --  6.8*    < > = values in this interval not displayed.      IMPRESSION:   Reason for surgery/procedure: surgical debridement  Diagnosis/reason for consult: preoperative evaluation    The proposed surgical procedure is considered INTERMEDIATE risk.    REVISED CARDIAC RISK INDEX  The patient has the following serious cardiovascular risks for perioperative complications such as (MI, PE, VFib and 3  AV Block):  Coronary Artery Disease (MI, positive stress test, angina, Qs on EKG)  Congestive Heart " Failure (pulmonary edema, PND, s3 darlene, CXR with pulmonary congestion, basilar rales)  INTERPRETATION: 1 risks: Class II (low risk - 0.9% complication rate)    The patient has the following additional risks for perioperative complications:  Cardiac rhythm disturbance, mechanical valves.      ICD-10-CM    1. Preop general physical exam Z01.818      2.  Wound infection    3.  CAD, CHF, rhythm disturbances/mechanical heart valves:     Plan:  Recommend hospitalist consult on arrival to hospital on 2/10/20.    Consider cardiology consultation to assist with perioperative   Management.    Preadmission for bridging heparin planned.  INR management per    INR clinic/cardiology  4.  Anemia   Plan:  Recommend checking CBC with diff, BMP and further labs per   hospitalist on the day of admission.    5.  HTN:  Stable.      6.  GAGE:  On CPAP    7.  UC:  On asacol  RECOMMENDATIONS:     --Consult hospital rounder / IM to assist post-op medical management    --Patient is to take all scheduled medications on the day of surgery EXCEPT for modifications listed below.    Anticoagulant or Antiplatelet Medication Use  Preadmission for heparin bridging.        Patient will need labs and hospitalist, with possible cardiology evaluation, prior to proceeding with planned procedure.     Signed Electronically by: Ne Durbin MD    Copy of this evaluation report is provided to requesting physician.    [place] Preop Guidelines    Revised Cardiac Risk Index

## 2020-02-07 NOTE — PROGRESS NOTES
"ANTICOAGULATION FOLLOW-UP CLINIC VISIT    Patient Name:  Fausto Farr  Date:  2/7/2020  Contact Type:  Face to Face    SUBJECTIVE:  Patient Findings     Comments:   Patient with history of right TKA in July 2019.  Post op course complicated by poor wound healing and soft tissue infection.  Now scheduled for further debridement and antibiotic bead placement.    Per cardiology (Hansa) 7/8/19:             \"I am not recommending Lovenox only because I think the patient is at higher than average risk being off the blood thinner.  He has 2 mechanical valves, one of which is a repeat.  He had a previous history of stroke and atrial flutter, although that should be ablated, and I am going to follow the more traditional recommendation of IV heparin versus subcutaneous Lovenox.\"    INR is subtherapeutic today at 2.2.   Patient will increase dose to 7.5 mg daily until admission to the hospital for bridging with heparin on 2/10/20. Surgery is scheduled for 2/12/20.   Follow up per post-op instructions.          Clinical Outcomes     Comments:   Patient with history of right TKA in July 2019.  Post op course complicated by poor wound healing and soft tissue infection.  Now scheduled for further debridement and antibiotic bead placement.    Per cardiology (Hansa) 7/8/19:             \"I am not recommending Lovenox only because I think the patient is at higher than average risk being off the blood thinner.  He has 2 mechanical valves, one of which is a repeat.  He had a previous history of stroke and atrial flutter, although that should be ablated, and I am going to follow the more traditional recommendation of IV heparin versus subcutaneous Lovenox.\"    INR is subtherapeutic today at 2.2.   Patient will increase dose to 7.5 mg daily until admission to the hospital for bridging with heparin on 2/10/20. Surgery is scheduled for 2/12/20.   Follow up per post-op instructions.             OBJECTIVE    INR Protime   Date Value " Ref Range Status   2020 2.2 (A) 0.86 - 1.14 Final       ASSESSMENT / PLAN  INR assessment SUB    Recheck INR In: 3 DAYS    INR Location Clinic      Anticoagulation Summary  As of 2020    INR goal:   2.5-3.5   TTR:   74.8 % (11.2 mo)   INR used for dosin.2! (2020)   Warfarin maintenance plan:   5 mg (5 mg x 1) every Mon, Wed, Fri; 7.5 mg (5 mg x 1.5) all other days   Full warfarin instructions:   : 7.5 mg; Otherwise 5 mg every Mon, Wed, Fri; 7.5 mg all other days   Weekly warfarin total:   45 mg   Plan last modified:   Diane Delcid RN (2019)   Next INR check:   2/10/2020   Priority:   High   Target end date:   Indefinite    Indications    AF (atrial fibrillation) (H) [I48.91]  S/P mitral valve replacement [Z95.2]  Long term current use of anticoagulant therapy [Z79.01]             Anticoagulation Episode Summary     INR check location:       Preferred lab:   EXTERNAL LAB    Send INR reminders to:   SHANNA PERRY    Comments:   5mg tabs - andrade dose // transfer from [facility] // [facility] // CALENDAR // right knee replaced 19 //Home care      Anticoagulation Care Providers     Provider Role Specialty Phone number    Jodi Flower Mai, MD  Internal Medicine [phone]            See the Encounter Report to view Anticoagulation Flowsheet and Dosing Calendar (Go to Encounters tab in chart review, and find the Anticoagulation Therapy Visit)    INR is subtherapeutic today at 2.2.   Patient will increase dose to 7.5 mg daily until admission to the hospital for bridging with heparin on 2/10/20. Surgery is scheduled for 20.   Follow up per post-op instructions.    Dosage adjustment made based on physician directed care plan.      Caryn Campos RN

## 2020-02-07 NOTE — TELEPHONE ENCOUNTER
Dr. Durbin requested I call Odalys regarding this patient and his INR.  She had called Dr. Santo' office (cardiology) and Odalys wanted to talk to the INR nurse.    Spoke to Odalys by phone.  Cardiology office was unaware patient was having surgery.  She was concerned that his INR may drop below 2.0 before surgery.    Gave her this information:  10 days ago INR was 3.4 and today was 2.2 which is below his goal of 2.5-3.5.  He was instructed to increase his dose today by 2.5 mg (5.6%) and continue warfarin until he goes into the hospital on Monday to prep for surgery on Wed.    Caryn Campos RN  [facility]

## 2020-02-07 NOTE — TELEPHONE ENCOUNTER
Call from a Dr Durbin doing H&P for leg debridement. Pt double valve and is to be admitted to hospital Monday for Heparin IV. Pt INR today 2.2, and was instructed by coumadin clinic [facility] to increase dose to 7.5 mg today and thru weekend.  Pt denies any missed doses and was given Cefadroxil for infection last Monday.  Pt c/o light headedness at times he is SSS waiting to get pacemaker after leg issues clears. Informed Pt if he becomes more dizziness or syncope should seek medical attention sooner.  Pt will eat greens with dinner meal so INR does not climb to much. Pt will be admitted Monday by ortho for debridement. MAHSA Rich RN

## 2020-02-07 NOTE — PATIENT INSTRUCTIONS
Before Your Surgery      Call your surgeon if there is any change in your health. This includes signs of a cold or flu (such as a sore throat, runny nose, cough, rash or fever).    Do not smoke, drink alcohol or take over the counter medicine (unless your surgeon or primary care doctor tells you to) for the 24 hours before and after surgery.    If you take prescribed drugs: Follow your doctor s orders about which medicines to take and which to stop until after surgery.    Eating and drinking prior to surgery: follow the instructions from your surgeon    Take a shower or bath the night before surgery. Use the soap your surgeon gave you to gently clean your skin. If you do not have soap from your surgeon, use your regular soap. Do not shave or scrub the surgery site.  Wear clean pajamas and have clean sheets on your bed.     Please follow up with your primary care provider as planned,  sooner if needed.

## 2020-02-10 ENCOUNTER — HOSPITAL ENCOUNTER (INPATIENT)
Facility: CLINIC | Age: 80
LOS: 5 days | Discharge: HOME | DRG: 908 | End: 2020-02-15
Attending: ORTHOPAEDIC SURGERY | Admitting: ORTHOPAEDIC SURGERY
Payer: MEDICARE

## 2020-02-10 DIAGNOSIS — L08.9 INFECTED ABRASION OF KNEE: ICD-10-CM

## 2020-02-10 DIAGNOSIS — M00.862 ARTHRITIS OF LEFT KNEE DUE TO OTHER BACTERIA (H): ICD-10-CM

## 2020-02-10 DIAGNOSIS — S80.219A INFECTED ABRASION OF KNEE: ICD-10-CM

## 2020-02-10 DIAGNOSIS — M00.80 ARTHRITIS DUE TO OTHER BACTERIA, SEPTIC ARTHRITIS OF UNSPECIFIED LOCATION (H): Primary | ICD-10-CM

## 2020-02-10 DIAGNOSIS — I48.19 PERSISTENT ATRIAL FIBRILLATION (H): ICD-10-CM

## 2020-02-10 PROBLEM — M00.9 SEPTIC JOINT (H): Status: ACTIVE | Noted: 2020-02-10

## 2020-02-10 LAB
ANION GAP SERPL CALCULATED.3IONS-SCNC: 4 MMOL/L (ref 3–14)
BUN SERPL-MCNC: 20 MG/DL (ref 7–30)
CALCIUM SERPL-MCNC: 9 MG/DL (ref 8.5–10.1)
CHLORIDE SERPL-SCNC: 102 MMOL/L (ref 94–109)
CO2 SERPL-SCNC: 30 MMOL/L (ref 20–32)
CREAT SERPL-MCNC: 1.02 MG/DL (ref 0.66–1.25)
CRP SERPL-MCNC: 15 MG/L (ref 0–8)
ERYTHROCYTE [DISTWIDTH] IN BLOOD BY AUTOMATED COUNT: 13 % (ref 10–15)
ERYTHROCYTE [DISTWIDTH] IN BLOOD BY AUTOMATED COUNT: 13.1 % (ref 10–15)
ERYTHROCYTE [SEDIMENTATION RATE] IN BLOOD BY WESTERGREN METHOD: 43 MM/H (ref 0–20)
GFR SERPL CREATININE-BSD FRML MDRD: 70 ML/MIN/{1.73_M2}
GLUCOSE SERPL-MCNC: 142 MG/DL (ref 70–99)
HCT VFR BLD AUTO: 35.6 % (ref 40–53)
HCT VFR BLD AUTO: 35.7 % (ref 40–53)
HGB BLD-MCNC: 11.1 G/DL (ref 13.3–17.7)
HGB BLD-MCNC: 11.2 G/DL (ref 13.3–17.7)
INR PPP: 2.06 (ref 0.86–1.14)
LMWH PPP CHRO-ACNC: <0.1 IU/ML
MCH RBC QN AUTO: 27.7 PG (ref 26.5–33)
MCH RBC QN AUTO: 28.1 PG (ref 26.5–33)
MCHC RBC AUTO-ENTMCNC: 31.1 G/DL (ref 31.5–36.5)
MCHC RBC AUTO-ENTMCNC: 31.5 G/DL (ref 31.5–36.5)
MCV RBC AUTO: 89 FL (ref 78–100)
MCV RBC AUTO: 89 FL (ref 78–100)
PLATELET # BLD AUTO: 339 10E9/L (ref 150–450)
PLATELET # BLD AUTO: 347 10E9/L (ref 150–450)
POTASSIUM SERPL-SCNC: 3.8 MMOL/L (ref 3.4–5.3)
RBC # BLD AUTO: 3.98 10E12/L (ref 4.4–5.9)
RBC # BLD AUTO: 4.01 10E12/L (ref 4.4–5.9)
SODIUM SERPL-SCNC: 136 MMOL/L (ref 133–144)
WBC # BLD AUTO: 9.7 10E9/L (ref 4–11)
WBC # BLD AUTO: 9.8 10E9/L (ref 4–11)

## 2020-02-10 PROCEDURE — 93005 ELECTROCARDIOGRAM TRACING: CPT

## 2020-02-10 PROCEDURE — 40000275 ZZH STATISTIC RCP TIME EA 10 MIN

## 2020-02-10 PROCEDURE — 0JWW07Z REVISION OF AUTOLOGOUS TISSUE SUBSTITUTE IN LOWER EXTREMITY SUBCUTANEOUS TISSUE AND FASCIA, OPEN APPROACH: ICD-10-PCS | Performed by: ORTHOPAEDIC SURGERY

## 2020-02-10 PROCEDURE — 0JDN0ZZ EXTRACTION OF RIGHT LOWER LEG SUBCUTANEOUS TISSUE AND FASCIA, OPEN APPROACH: ICD-10-PCS | Performed by: ORTHOPAEDIC SURGERY

## 2020-02-10 PROCEDURE — 36415 COLL VENOUS BLD VENIPUNCTURE: CPT | Performed by: ORTHOPAEDIC SURGERY

## 2020-02-10 PROCEDURE — 87040 BLOOD CULTURE FOR BACTERIA: CPT | Performed by: INTERNAL MEDICINE

## 2020-02-10 PROCEDURE — 85652 RBC SED RATE AUTOMATED: CPT | Performed by: INTERNAL MEDICINE

## 2020-02-10 PROCEDURE — 0SBC0ZX EXCISION OF RIGHT KNEE JOINT, OPEN APPROACH, DIAGNOSTIC: ICD-10-PCS | Performed by: ORTHOPAEDIC SURGERY

## 2020-02-10 PROCEDURE — 85520 HEPARIN ASSAY: CPT | Performed by: ORTHOPAEDIC SURGERY

## 2020-02-10 PROCEDURE — 86140 C-REACTIVE PROTEIN: CPT | Performed by: INTERNAL MEDICINE

## 2020-02-10 PROCEDURE — 85027 COMPLETE CBC AUTOMATED: CPT | Performed by: INTERNAL MEDICINE

## 2020-02-10 PROCEDURE — 25000128 H RX IP 250 OP 636: Performed by: ORTHOPAEDIC SURGERY

## 2020-02-10 PROCEDURE — 99223 1ST HOSP IP/OBS HIGH 75: CPT | Mod: AI | Performed by: INTERNAL MEDICINE

## 2020-02-10 PROCEDURE — 0SHC08Z INSERTION OF SPACER INTO RIGHT KNEE JOINT, OPEN APPROACH: ICD-10-PCS | Performed by: ORTHOPAEDIC SURGERY

## 2020-02-10 PROCEDURE — 80048 BASIC METABOLIC PNL TOTAL CA: CPT | Performed by: INTERNAL MEDICINE

## 2020-02-10 PROCEDURE — 25000132 ZZH RX MED GY IP 250 OP 250 PS 637: Mod: GY | Performed by: INTERNAL MEDICINE

## 2020-02-10 PROCEDURE — 36415 COLL VENOUS BLD VENIPUNCTURE: CPT | Performed by: INTERNAL MEDICINE

## 2020-02-10 PROCEDURE — 85610 PROTHROMBIN TIME: CPT | Performed by: INTERNAL MEDICINE

## 2020-02-10 PROCEDURE — 25800030 ZZH RX IP 258 OP 636: Performed by: ORTHOPAEDIC SURGERY

## 2020-02-10 PROCEDURE — 12000000 ZZH R&B MED SURG/OB

## 2020-02-10 PROCEDURE — 93010 ELECTROCARDIOGRAM REPORT: CPT | Performed by: INTERNAL MEDICINE

## 2020-02-10 RX ORDER — FUROSEMIDE 20 MG
20 TABLET ORAL DAILY
Status: DISCONTINUED | OUTPATIENT
Start: 2020-02-11 | End: 2020-02-15 | Stop reason: HOSPADM

## 2020-02-10 RX ORDER — ROSUVASTATIN CALCIUM 20 MG/1
40 TABLET, COATED ORAL EVERY EVENING
Status: DISCONTINUED | OUTPATIENT
Start: 2020-02-10 | End: 2020-02-15 | Stop reason: HOSPADM

## 2020-02-10 RX ORDER — LIDOCAINE 40 MG/G
CREAM TOPICAL
Status: DISCONTINUED | OUTPATIENT
Start: 2020-02-10 | End: 2020-02-13

## 2020-02-10 RX ORDER — WARFARIN SODIUM 5 MG/1
TABLET ORAL
COMMUNITY
End: 2020-03-25

## 2020-02-10 RX ORDER — POLYETHYLENE GLYCOL 3350 17 G/17G
17 POWDER, FOR SOLUTION ORAL DAILY PRN
Status: DISCONTINUED | OUTPATIENT
Start: 2020-02-10 | End: 2020-02-15 | Stop reason: HOSPADM

## 2020-02-10 RX ORDER — OXYCODONE HYDROCHLORIDE 5 MG/1
5 TABLET ORAL EVERY 6 HOURS PRN
COMMUNITY
Start: 2020-01-02 | End: 2020-07-24

## 2020-02-10 RX ORDER — MESALAMINE 800 MG/1
800 TABLET, DELAYED RELEASE ORAL 2 TIMES DAILY
Status: DISCONTINUED | OUTPATIENT
Start: 2020-02-10 | End: 2020-02-15 | Stop reason: HOSPADM

## 2020-02-10 RX ORDER — ACETAMINOPHEN 500 MG
500-1000 TABLET ORAL EVERY 6 HOURS PRN
Status: ON HOLD | COMMUNITY
End: 2023-01-09

## 2020-02-10 RX ORDER — ONDANSETRON 4 MG/1
4 TABLET, ORALLY DISINTEGRATING ORAL EVERY 6 HOURS PRN
Status: DISCONTINUED | OUTPATIENT
Start: 2020-02-10 | End: 2020-02-12

## 2020-02-10 RX ORDER — TAMSULOSIN HYDROCHLORIDE 0.4 MG/1
0.4 CAPSULE ORAL EVERY EVENING
Status: DISCONTINUED | OUTPATIENT
Start: 2020-02-10 | End: 2020-02-15 | Stop reason: HOSPADM

## 2020-02-10 RX ORDER — METHOCARBAMOL 750 MG/1
750 TABLET, FILM COATED ORAL EVERY 6 HOURS PRN
Status: DISCONTINUED | OUTPATIENT
Start: 2020-02-10 | End: 2020-02-12

## 2020-02-10 RX ORDER — HEPARIN SODIUM 10000 [USP'U]/100ML
1250 INJECTION, SOLUTION INTRAVENOUS CONTINUOUS
Status: DISCONTINUED | OUTPATIENT
Start: 2020-02-10 | End: 2020-02-12

## 2020-02-10 RX ORDER — OXYCODONE HYDROCHLORIDE 5 MG/1
5-10 TABLET ORAL
Status: DISCONTINUED | OUTPATIENT
Start: 2020-02-10 | End: 2020-02-12

## 2020-02-10 RX ORDER — DOCUSATE SODIUM 100 MG/1
100 CAPSULE, LIQUID FILLED ORAL 2 TIMES DAILY
Status: DISCONTINUED | OUTPATIENT
Start: 2020-02-10 | End: 2020-02-15 | Stop reason: HOSPADM

## 2020-02-10 RX ORDER — EZETIMIBE 10 MG/1
10 TABLET ORAL EVERY EVENING
Status: DISCONTINUED | OUTPATIENT
Start: 2020-02-10 | End: 2020-02-15 | Stop reason: HOSPADM

## 2020-02-10 RX ORDER — NALOXONE HYDROCHLORIDE 0.4 MG/ML
.1-.4 INJECTION, SOLUTION INTRAMUSCULAR; INTRAVENOUS; SUBCUTANEOUS
Status: DISCONTINUED | OUTPATIENT
Start: 2020-02-10 | End: 2020-02-15 | Stop reason: HOSPADM

## 2020-02-10 RX ORDER — ACETAMINOPHEN 325 MG/1
650 TABLET ORAL EVERY 4 HOURS PRN
Status: DISCONTINUED | OUTPATIENT
Start: 2020-02-10 | End: 2020-02-12

## 2020-02-10 RX ORDER — ONDANSETRON 2 MG/ML
4 INJECTION INTRAMUSCULAR; INTRAVENOUS EVERY 6 HOURS PRN
Status: DISCONTINUED | OUTPATIENT
Start: 2020-02-10 | End: 2020-02-12

## 2020-02-10 RX ADMIN — EZETIMIBE 10 MG: 10 TABLET ORAL at 22:10

## 2020-02-10 RX ADMIN — ROSUVASTATIN CALCIUM 40 MG: 20 TABLET, FILM COATED ORAL at 22:10

## 2020-02-10 RX ADMIN — Medication 2500 UNITS: at 16:36

## 2020-02-10 RX ADMIN — TAMSULOSIN HYDROCHLORIDE 0.4 MG: 0.4 CAPSULE ORAL at 22:11

## 2020-02-10 RX ADMIN — METHOCARBAMOL TABLETS 750 MG: 750 TABLET, COATED ORAL at 22:16

## 2020-02-10 RX ADMIN — VANCOMYCIN HYDROCHLORIDE 1500 MG: 10 INJECTION, POWDER, LYOPHILIZED, FOR SOLUTION INTRAVENOUS at 17:24

## 2020-02-10 RX ADMIN — ACETAMINOPHEN 650 MG: 325 TABLET, FILM COATED ORAL at 22:19

## 2020-02-10 RX ADMIN — HEPARIN SODIUM 850 UNITS/HR: 10000 INJECTION, SOLUTION INTRAVENOUS at 16:35

## 2020-02-10 RX ADMIN — MESALAMINE 800 MG: 800 TABLET, DELAYED RELEASE ORAL at 22:10

## 2020-02-10 ASSESSMENT — ACTIVITIES OF DAILY LIVING (ADL)
ADLS_ACUITY_SCORE: 22
ADLS_ACUITY_SCORE: 22

## 2020-02-10 ASSESSMENT — MIFFLIN-ST. JEOR: SCORE: 1551.12

## 2020-02-10 NOTE — PHARMACY-VANCOMYCIN DOSING SERVICE
Pharmacy Vancomycin Initial Note  Date of Service February 10, 2020  Patient's  1940  79 year old, male    Indication: Skin and Soft Tissue Infection    Current estimated CrCl = Estimated Creatinine Clearance: 62.1 mL/min (based on SCr of 1.02 mg/dL).    Creatinine for last 3 days  2/10/2020:  2:29 PM Creatinine 1.02 mg/dL    Recent Vancomycin Level(s) for last 3 days  No results found for requested labs within last 72 hours.      Vancomycin IV Administrations (past 72 hours)      No vancomycin orders with administrations in past 72 hours.                Nephrotoxins and other renal medications (From now, onward)    Start     Dose/Rate Route Frequency Ordered Stop    02/10/20 1600  vancomycin 1500 mg in 0.9% NaCl 250 ml intermittent infusion 1,500 mg      1,500 mg  over 90 Minutes Intravenous EVERY 12 HOURS 02/10/20 1508            Contrast Orders - past 72 hours (72h ago, onward)    None                Plan:  1.  Start vancomycin  1500 mg IV q12h.   2.  Goal Trough Level: 10-15 mg/L   3.  Pharmacy will check trough levels as appropriate in 1-3 Days.    4. Serum creatinine levels will be ordered a minimum of twice weekly.    5. [place] method utilized to dose vancomycin therapy: Method 1    Lona Ramírez [facility]

## 2020-02-10 NOTE — H&P
Admitted:     02/10/2020      CHIEF COMPLAINT:  Right lower extremity infection.  Hospitalist Service asked to admit this patient by Dr. Jensen to assist with medical care prior to anticipated right lower extremity surgery.      HISTORY OF PRESENT ILLNESS:  Mr. Farr is a pleasant 79-year-old male with a history of mechanical mitral valve replacement, mechanical aortic valve replacement and paroxysmal atrial fibrillation.  He is maintained on warfarin with goal INR of 2.5 to 3.5.      The patient underwent right total knee arthroplasty in 07/2019.  His course was complicated by chronic nonhealing wound.  The patient was admitted in 11/2019 and underwent plastic surgery with gastrocnemius muscle transfer to treat the chronic wound.  The patient reports that surgery was effective initially but, unfortunately, over the past 2-1/2 weeks, he started to have drainage from the previously-healed of right knee incision.  He has had no systemic symptoms.  No fevers or chills.  He was seen by Dr. Jensen and advised to be admitted to the hospital for surgery, debridement, and antibiotic bead placement.  He is being admitted prior to scheduled surgery in 2 days for heparin bridging.      On arrival to the hospital today, vital signs included blood pressure 150/80 with heart rate of 95, afebrile, saturation 96% on room air.      The patient reports that the warfarin dose was 2 days ago.  He did not take a dose last night.      PAST MEDICAL HISTORY:   1.  History of right total knee arthroplasty performed 07/2019.  This was complicated by chronic wound.  He underwent a gastrocnemius muscle flap transfer for treatment of the wound.  This is now complicated by wound drainage just below his right knee for the past 10 days, as noted above.   2.  Paroxysmal atrial fibrillation.   3.  Anemia.   4.  Mechanical mitral valve replacement and aortic valve replacement with goal INR 2.5-3.5.   5.  History of coronary artery disease, status  post coronary bypass graft surgery in 2006.      CURRENT OUTPATIENT MEDICATIONS:  Await Pharmacy reconciliation medication list to verify below:   1.  Acetaminophen.   2.  Betamethasone cream.   3.  Cholecalciferol.   4.  Zetia.   5.  Ferrous sulfate.   6.  Lidex ointment.   7.  Lasix.   8.  [place].   9.  Asacol.   10.  Robaxin.   11.  Crestor.   12.  Senokot.   13.  Tamsulosin.   14.  Warfarin.   15.  Glucosamine/chondroitin.      DRUG ALLERGIES:  BEE STINGS.      FAMILY HISTORY:  Reviewed.  Nothing contributory to this admission.      SOCIAL HISTORY:  The patient drinks alcohol socially.  Last alcoholic beverage was about 6 months ago.  No smoking.  He is here with his spouse.      REVIEW OF SYSTEMS:  See HPI for details.  Comprehensive greater than 10-point review of systems is otherwise negative besides that detailed above.      PHYSICAL EXAMINATION:   VITAL SIGNS:  Blood pressure is currently 150/80 with heart rate of 95, afebrile, saturation 96% on room air.   GENERAL:  The patient appears nontoxic, in no acute distress.  Appears awake, alert and oriented x3.  Spouse is present at bedside.  He is a good historian.   HEENT:  Head is atraumatic.  Sclerae are white, pale.  Eyelids are normal.  Conjunctivae are normal.  Extraocular movements are intact.   NECK:  Supple.  No cervical or supraclavicular lymphadenopathy.   HEART:  Regular rate and rhythm.  Mechanical mitral and aortic valve clicks are present.  Rhythm is normal.   LUNGS:  Clear to auscultation bilaterally.  No intercostal retractions.  No conversational dyspnea.   ABDOMEN:  Nontender, nondistended, soft.  No masses.  No organomegaly.   EXTREMITIES:  No edema.   SKIN:  No rashes.  No jaundice.  Skin is dry to touch.  Right wound is covered with an Ace wrap.   NEUROLOGIC:  Cranial nerves II-XII are intact.  Moves extremities appropriately.  Sensation intact to light touch in upper and lower extremities bilaterally.   NEUROPSYCHIATRIC:  The patient  awake, alert and oriented x3.  Mood and affect are normal and appropriate.      LABORATORY AND IMAGING DATA:  Labs have been ordered, not yet performed.      IMPRESSION:  Mr. Farr is a 79-year-old male with a history of mechanical mitral valve replacement, mechanical aortic valve replacement, paroxysmal atrial fibrillation, chronic warfarin therapy, and history of previous TKA in 07/2019 complicated by chronic nonhealing wound.  He underwent a gastrocnemius muscle flap to repair the non-healing wound.  Unfortunately, incision from previous surgery is now opened and draining, with concern for underlying infection present.  The patient was admitted at the request of Dr. Jensen for planned debridement and antibiotic bead placement on 02/12/2020.  He was admitted prior to surgery for heparin bridging the setting of his valve replacements and atrial fibrillation history.   1.  History of mechanical mitral valve replacement and mechanical aortic valve replacement.  He has had no complications with his heart valves since surgery.   2.  History of paroxysmal atrial fibrillation.   3.  Chronic anticoagulation with warfarin.  Goal INR is 2.5-3.5.   4.  Previous total knee arthroplasty complicated by wound requiring attempted gastrocnemius muscle flap in 11/2019 which is now complicated by the incision draining fluid and concern for underlying deeper infection.   5.  History of anemia.   6.  History of previous coronary bypass graft surgery.      PLAN:   1.  Admit to Hospitalist Service.   2.  Orthopedic consultation by Dr. Jensen for proposed procedure on Wednesday, 02/12/2020.   3.  Check labs including CBC, BMP, INR.  If INR is below 2.5, will start heparin drip.  Hold Coumadin for now until after surgery.   4.  For completeness, we will check blood cultures in the setting of his mechanical valve replacements, though clinically I doubt bacteremia from his knee infection.   5.  Check CRP and ESR levels.   6.  Check  preoperative EKG.  The patient reports to me that several days ago he had a brief bout of chest discomfort when he got up from a seated position.  It was a sharp pain in the middle of his chest and went away within a few moments.  Pain does not sound cardiac to me.  We will check a preoperative EKG for completeness.  I instructed the patient that if the pain recurs, to alert staff.  He otherwise reports he has not had any chest pain in the past several years.   7.  Follow daily INR.  May need dose of vitamin K tomorrow if INR not less than 1.5 prior to planned surgery on 20.  8.  Will treat with IV vancomycin for wound infection/cellulitis        BESS COLE MD             D: 02/10/2020   T: 02/10/2020   MT: ABRAHAN      Name:     LIANG VAUGHN   MRN:      [id number]        Account:      [id number]   :      1940        Admitted:     02/10/2020                   Document: [id number]

## 2020-02-10 NOTE — PHARMACY-ADMISSION MEDICATION HISTORY
Admission medication history interview status for this patient is complete. See [facility] admission navigator for allergy information, prior to admission medications and immunization status.     Medication history interview source(s):Patient  Medication history resources (including written lists, pill bottles, clinic record): [employer] dispense records  Primary pharmacy:     Changes made to PTA medication list:  Added: Oxycodone; Instructions to Cefadroxil;  Deleted: Norco; Senna-docusate  Changed: Tylenol 650mg Q8H PRN --> 500-1000mg Q6H PRN    Actions taken by pharmacist (provider contacted, etc):None     Additional medication history information: Patient started 10-day course cefadroxil on 02/04/20.     Medication reconciliation/reorder completed by provider prior to medication history?  No     Do you take OTC medications (eg tylenol, ibuprofen, fish oil, eye/ear drops, etc)? Yes         Prior to Admission medications    Medication Sig Last Dose Taking? Auth Provider   acetaminophen (TYLENOL) 500 MG tablet Take 500-1,000 mg by mouth every 6 hours as needed for pain 2/9/2020 at Unknown time Yes Unknown, Entered By History   betamethasone valerate (VALISONE) 0.1 % external lotion Apply topically 2 times daily Apply topically to scalp twice daily PRN  Yes Jodi Flower Mai, MD   cefadroxil (DURICEF) 500 MG capsule Take 500 mg by mouth 2 times daily  2/10/2020 at x1 Yes Reported, Patient   cholecalciferol 25 MCG (1000 UT) TABS Take 1,000 Units by mouth daily 2/10/2020 at AM Yes Unknown, Entered By History   ezetimibe (ZETIA) 10 MG tablet Take 1 tablet (10 mg) by mouth daily 2/9/2020 at PM Yes Magdiel Dumas MD   ferrous sulfate (FEROSUL) 325 (65 Fe) MG tablet Take 325 mg by mouth daily (with breakfast) 2/10/2020 at AM Yes Unknown, Entered By History   fluocinonide (LIDEX) 0.05 % external ointment Apply topically 2 times daily as needed  Yes Reported, Patient   furosemide (LASIX) 40 MG tablet Take 0.5 tablets (20 mg) by  mouth daily 2/10/2020 at AM Yes Tu Reyes MD   glucosamine-chondroitin 500-400 MG CAPS per capsule Take 1 capsule by mouth 2 times daily 2/10/2020 at x1 Yes Unknown, Entered By History   mesalamine (ASACOL HD) 800 MG EC tablet Take 1 tablet (800 mg) by mouth 2 times daily 2/10/2020 at x1 Yes Tu Reyes MD   methocarbamol (ROBAXIN) 750 MG tablet Take 1 tablet (750 mg) by mouth every 6 hours as needed for muscle spasms 2/9/2020 at PM Yes Reba Escobar APRN CNP   oxyCODONE (ROXICODONE) 5 MG tablet Take 5 mg by mouth every 6 hours as needed 2/9/2020 at PM Yes Unknown, Entered By History   rosuvastatin (CRESTOR) 40 MG tablet Take 1 tablet (40 mg) by mouth daily 2/9/2020 at PM Yes Magdiel Dumas MD   tamsulosin (FLOMAX) 0.4 MG capsule TAKE 1 CAPSULE BY MOUTH EVERY DAY 2/9/2020 at PM Yes Tu Reyes MD   warfarin ANTICOAGULANT (COUMADIN) 5 MG tablet Take 5mg on Tuesday, Wednesday, and Friday.  Take 7.5mg all other days of the week, or as directed by anticoagulation clinic.  Yes Unknown, Entered By History

## 2020-02-10 NOTE — PLAN OF CARE
Direct admit today at 1320, VSS on RA, A/OX4, LS clear, SBA, Reg diet, SBA, 2/10 leg pain, IV started, R leg dressing changed, INR-2.06, hep gtt ordered, vanco ordered, no discharge orders at this time.

## 2020-02-10 NOTE — CONSULTS
Dr. Jensen aware of patient    Scheduled for right knee I&D with placement of abx beads and possible wound vac    Patient on Coumadin with mechanical valve and admitted so that he may have INR normalized and be placed on Heparin drip in anticipation for surgery, this shoulder be stopped 6 hours prior to surgery.    Continue current cares    Rochelle Washington PAC

## 2020-02-11 LAB
BACTERIA SPEC CULT: ABNORMAL
CREAT SERPL-MCNC: 0.85 MG/DL (ref 0.66–1.25)
GFR SERPL CREATININE-BSD FRML MDRD: 83 ML/MIN/{1.73_M2}
INR PPP: 1.6 (ref 0.86–1.14)
LMWH PPP CHRO-ACNC: 0.11 IU/ML
LMWH PPP CHRO-ACNC: 0.23 IU/ML
Lab: ABNORMAL
SPECIMEN SOURCE: ABNORMAL

## 2020-02-11 PROCEDURE — 36415 COLL VENOUS BLD VENIPUNCTURE: CPT | Performed by: INTERNAL MEDICINE

## 2020-02-11 PROCEDURE — 99232 SBSQ HOSP IP/OBS MODERATE 35: CPT | Performed by: INTERNAL MEDICINE

## 2020-02-11 PROCEDURE — 25800030 ZZH RX IP 258 OP 636: Performed by: ORTHOPAEDIC SURGERY

## 2020-02-11 PROCEDURE — 12000000 ZZH R&B MED SURG/OB

## 2020-02-11 PROCEDURE — 85610 PROTHROMBIN TIME: CPT | Performed by: INTERNAL MEDICINE

## 2020-02-11 PROCEDURE — 25000128 H RX IP 250 OP 636: Performed by: ORTHOPAEDIC SURGERY

## 2020-02-11 PROCEDURE — 85520 HEPARIN ASSAY: CPT | Performed by: ORTHOPAEDIC SURGERY

## 2020-02-11 PROCEDURE — 85520 HEPARIN ASSAY: CPT | Performed by: INTERNAL MEDICINE

## 2020-02-11 PROCEDURE — 82565 ASSAY OF CREATININE: CPT | Performed by: INTERNAL MEDICINE

## 2020-02-11 PROCEDURE — 25000132 ZZH RX MED GY IP 250 OP 250 PS 637: Mod: GY | Performed by: INTERNAL MEDICINE

## 2020-02-11 PROCEDURE — 36415 COLL VENOUS BLD VENIPUNCTURE: CPT | Performed by: ORTHOPAEDIC SURGERY

## 2020-02-11 RX ORDER — CEFAZOLIN SODIUM 1 G/50ML
1 INJECTION, SOLUTION INTRAVENOUS SEE ADMIN INSTRUCTIONS
Status: CANCELLED | OUTPATIENT
Start: 2020-02-11

## 2020-02-11 RX ORDER — CEFAZOLIN SODIUM 2 G/100ML
2 INJECTION, SOLUTION INTRAVENOUS
Status: CANCELLED | OUTPATIENT
Start: 2020-02-11

## 2020-02-11 RX ADMIN — Medication 2200 UNITS: at 08:47

## 2020-02-11 RX ADMIN — ACETAMINOPHEN 650 MG: 325 TABLET, FILM COATED ORAL at 21:31

## 2020-02-11 RX ADMIN — ROSUVASTATIN CALCIUM 40 MG: 20 TABLET, FILM COATED ORAL at 20:40

## 2020-02-11 RX ADMIN — MESALAMINE 800 MG: 800 TABLET, DELAYED RELEASE ORAL at 20:40

## 2020-02-11 RX ADMIN — METHOCARBAMOL TABLETS 750 MG: 750 TABLET, COATED ORAL at 21:31

## 2020-02-11 RX ADMIN — TAMSULOSIN HYDROCHLORIDE 0.4 MG: 0.4 CAPSULE ORAL at 20:40

## 2020-02-11 RX ADMIN — VANCOMYCIN HYDROCHLORIDE 1500 MG: 10 INJECTION, POWDER, LYOPHILIZED, FOR SOLUTION INTRAVENOUS at 16:46

## 2020-02-11 RX ADMIN — Medication 3500 UNITS: at 00:14

## 2020-02-11 RX ADMIN — MESALAMINE 800 MG: 800 TABLET, DELAYED RELEASE ORAL at 08:49

## 2020-02-11 RX ADMIN — OXYCODONE HYDROCHLORIDE 5 MG: 5 TABLET ORAL at 21:31

## 2020-02-11 RX ADMIN — HEPARIN SODIUM 1250 UNITS/HR: 10000 INJECTION, SOLUTION INTRAVENOUS at 09:49

## 2020-02-11 RX ADMIN — VANCOMYCIN HYDROCHLORIDE 1500 MG: 10 INJECTION, POWDER, LYOPHILIZED, FOR SOLUTION INTRAVENOUS at 05:16

## 2020-02-11 RX ADMIN — EZETIMIBE 10 MG: 10 TABLET ORAL at 20:40

## 2020-02-11 RX ADMIN — FUROSEMIDE 20 MG: 20 TABLET ORAL at 08:49

## 2020-02-11 ASSESSMENT — ACTIVITIES OF DAILY LIVING (ADL)
ADLS_ACUITY_SCORE: 22

## 2020-02-11 NOTE — PROGRESS NOTES
[facility]  Hospitalist Progress Note  Zion Torres MD 02/11/2020    Reason for Stay (Diagnosis):  RLE infection         Assessment and Plan:      Summary of Stay: Fausto Farr is a 79-year-old male with a history of mechanical mitral valve replacement, mechanical aortic valve replacement, paroxysmal atrial fibrillation, chronic warfarin therapy, and history of previous TKA in 07/2019 complicated by chronic nonhealing wound.  He underwent a gastrocnemius muscle flap to repair the non-healing wound.  Unfortunately, incision from previous surgery is now opened and draining, with concern for underlying infection present.  The patient was admitted at the request of Dr. Jensen for planned debridement and antibiotic bead placement on 02/12/2020.  He was admitted prior to surgery for heparin bridging the setting of his valve replacements and atrial fibrillation history.   Coumadin on hold; INR 1.6; on bridging heparin gtt; surgery planned for tomorrow    1.  History of mechanical mitral valve replacement and mechanical aortic valve replacement.  He has had no complications with his heart valves since surgery.   2.  History of paroxysmal atrial fibrillation.   3.  Chronic anticoagulation with warfarin.  Goal INR is 2.5-3.5.  Warfarin on hold for planned surgery 2/12; being covered with bridging heparin gtt.   During perioperative period.    4.  Previous total knee arthroplasty complicated by wound requiring attempted gastrocnemius muscle flap in 11/2019 which is now complicated by the incision draining fluid and concern for underlying deeper infection.  On IV vancomycin with plan for debridement and antibiotic bead placement by orthopedic surgery on 2/12.  5.  History of anemia.   6.  History of previous coronary bypass graft surgery.     Code full  Dvt ppx heparin gtt  Dispo:  Await surgery and operative findings.  Would anticipate pt will need Lovenox on discharge for bridging until INR  "therapeutic        Interval History (Subjective):      No complaints.  Surgery planned for tomorrow.  On heparin gtt                  Physical Exam:      Last Vital Signs:  /73 (BP Location: Left arm)   Pulse 78   Temp 96.9  F (36.1  C) (Oral)   Resp 16   Ht 1.664 m (5' 5.5\")   Wt 89.6 kg (197 lb 9.6 oz)   SpO2 97%   BMI 32.38 kg/m        Intake/Output Summary (Last 24 hours) at 2/11/2020 1314  Last data filed at 2/10/2020 2213  Gross per 24 hour   Intake 312.2 ml   Output --   Net 312.2 ml       Constitutional: Awake, alert, cooperative, no apparent distress.  Spouse present   Respiratory: Clear to auscultation bilaterally, no crackles or wheezing   Cardiovascular: Regular rate and rhythm, normal S1 and S2, and no murmur noted   Abdomen: Normal bowel sounds, soft, non-distended, non-tender   Skin: No rashes, no cyanosis, dry to touch   Neuro: Alert and oriented x3, no weakness, numbness, memory loss   Extremities: No edema, normal range of motion   Other(s):        All other systems: Negative          Medications:      All current medications were reviewed with changes reflected in problem list.         Data:      All new lab and imaging data was reviewed.   Labs:  Recent Labs   Lab 02/10/20  1511   WBC 9.7   HGB 11.1*   HCT 35.7*   MCV 89         Imaging:   No results found for this or any previous visit (from the past 24 hour(s)).   "

## 2020-02-11 NOTE — PROGRESS NOTES
[facility]  Patient is currently open to home care services with [facility].  The patient is currently receiving RN/PT services.  [facility]  and team have been notified of patient admission.  [facility] liaison will continue to follow patient during stay.  If appropriate provide orders to resume home care at time of discharge.

## 2020-02-11 NOTE — PROGRESS NOTES
"An EKG was completed on the pt, with no complications noted during the procedure.    Vital signs:  Temp: 97  F (36.1  C) Temp src: Oral BP: 124/71 Pulse: 86   Resp: 16 SpO2: 98 % O2 Device: None (Room air)   Height: 166.4 cm (5' 5.5\") Weight: 89.6 kg (197 lb 9.6 oz)  Estimated body mass index is 32.38 kg/m  as calculated from the following:    Height as of this encounter: 1.664 m (5' 5.5\").    Weight as of this encounter: 89.6 kg (197 lb 9.6 oz).    Past Medical History:   Diagnosis Date     Abdominal pain      Abnormal ECG     RBBB, 1st degree AVB, Left axis deviation     Anemia     currently taking iron     Arrhythmia     pac, pvc     Back pain     since 1980     BPH (benign prostatic hyperplasia)      Bruit      CAD (coronary artery disease)      Cellulitis 10/18/12     Cellulitis 05/2018    GrpB strep LLE cellulitis  negative RACHAEL for veg     Chronic venous insufficiency     bilat lower extremities     Contact dermatitis and other eczema, due to unspecified cause      Diaphragmatic hernia without mention of obstruction or gangrene      Diastolic HF (heart failure) (H)      Gastric ulcer      Glucose intolerance (impaired glucose tolerance)      Heart murmur 9/16/13    valvular heart disease     Hyperlipidemia LDL goal <100 8/6/2013     Hypertension 8/6/13     Lumbago      Malaise and fatigue      Metabolic syndrome      Mobitz (type) I (Wenckebach's) atrioventricular block     and RBBB     Nocturia 10/18/12     Nonallopathic lesion of cervical region      Nonallopathic lesion of lumbar region      Nonallopathic lesion of pelvic region, not elsewhere classified      Nonallopathic lesion of rib cage      Nonallopathic lesion of sacral region      GAGE (obstructive sleep apnea)     CPAP     Paroxysmal atrial fibrillation (H) 10/18/12     Prostate cancer (H) 2008    radiation seed, XRT      PVD (peripheral vascular disease) (H)      RBBB     1st degree AVB, RBBB, LAHB     Rotator cuff strain     and sprain     S/P AAA " repair      S/P aortic valve replacement 2006    developed perivalve leak and MS, therefore redo surg 2013     S/P CABG (coronary artery bypass graft) 2006    Lima-Lad, RA-Rca     Sciatica of left side     since 2000     Sepsis due to group B Streptococcus (H) 5/19/2018     Ulcerative colitis (H)      Varicose veins of bilateral lower extremities with other complications     s/p RLE vein stripping     Vitamin D deficiency        Past Surgical History:   Procedure Laterality Date     AORTIC VALVE REPLACEMENT  1/3/06    redo AVR SJM 21mm and SJM MVR 27mm in 2013SJM 21([id number]):AVR, SJM 27 :MVR-     APPLY WOUND VAC N/A 11/12/2019    Procedure: APPLICATION, WOUND VAC;  Surgeon: Sara Martinez MD;  Location:  OR     ARTHROPLASTY KNEE      right knee     ARTHROPLASTY KNEE Right 7/22/2019    Procedure: Right total knee arthroplasty;  Surgeon: Prasanth Jensne MD;  Location:  OR     BACK SURGERY  Oct 2015    Fusion L4-5, laminectomy L2, L3     BYPASS GRAFT ARTERY CORONARY  10/2013    reimplantation of radial artery graft to RCA     C CABG, VEIN, TWO  1/3/06    Left radial to RCA, LIMA to LAD (RA to RCA reimplanted at time of 2013 surg)     CARDIAC CATHERIZATION  11/2005    Stent placed to RCA     CARDIAC CATHERIZATION  04/2013    Occluded RCA, patent LIMA to LAD and radial graft to PDA     CARPAL TUNNEL RELEASE RT/LT  1994     COLONOSCOPY  8-22-11     CYSTOSCOPY FLEXIBLE  10/16/2013    Procedure: CYSTOSCOPY FLEXIBLE;  FLEXIBLE CYSTOSCOPY / DILATION OF URETHRA / INSERTION OF LESLIE;  Surgeon: Cooper Wallace MD;  Location:  OR     ENDOVASCULAR REPAIR, INFRARENAL ABDOMINAL AORTIC ANEURYSM/DISSECTION; MODULAR BIFURCATED PROSTHESIS  2006    AAA repair endovascular     ENT SURGERY       EP ABLATION ATRIAL FLUTTER N/A 4/22/2019    Procedure: EP Ablation Atrial Flutter;  Surgeon: Jessy Vasquez MD;  Location:  [facility]     GENITOURINARY SURGERY  6/16/08    radioactive  seeding     GRAFT FLAP PEDICLE EXTREMITY (LOCATION) Right 11/12/2019    Procedure: SURGICAL PROCUREMENT, FLAP, PEDICLE, EXTREMITY;  Surgeon: Sara Martinez MD;  Location: [facility]     GRAFT SKIN SPLIT THICKNESS FROM EXTREMITY Right 11/12/2019    Procedure: RIGHT GASTRONEMIUS FLAP TO RIGHT KNEE, SPLIT THICKNESS SKIN GRAFT FROM RIGHT THIGH TO RIGHT KNEE, SURGICAL PROCUREMENT, FLAP, PEDICLE, EXTREMITY, WOUND VAC PLACEMENT;  Surgeon: Sara Martinez MD;  Location:  OR     HEAD & NECK SURGERY  1997    vocal cord polypectomy     INCISION AND DRAINAGE KNEE, COMBINED Right 8/29/2019    Procedure: INCISION AND DRAINAGE, KNEE W/ Secondary Wound Closure;  Surgeon: Prasanth Jensen MD;  Location:  OR     IRRIGATION AND DEBRIDEMENT LOWER EXTREMITY, COMBINED Right 12/8/2019    Procedure: DEBRIDEMENT OF RIGHT CALF AND WOUND CLOSURE.;  Surgeon: Sara Martinez MD;  Location:  OR     KNEE SURGERY  2001 Right knee arthroscopy     OPTICAL TRACKING SYSTEM FUSION SPINE POSTERIOR LUMBAR THREE+ LEVELS N/A 10/29/2015    Procedure: OPTICAL TRACKING SYSTEM FUSION SPINE POSTERIOR LUMBAR THREE+ LEVELS;  Surgeon: Walt Garcia MD;  Location:  OR     PROSTATE SURGERY      radioactive seeding 6/16/08     REPAIR ANEURYSM ABDOMINAL AORTA  06/08     REPAIR VALVE MITRAL  10/16/2013    SJM 21([id number] 756):AVR, SJM 27 :MVRProcedure: REPAIR VALVE MITRAL;  REDO STERNOTOMY/REDO AORTIC VALVE REPLACEMENT/ MITRAL VALVE REPLACEMENT/REIMPLANTATION OF RIGHT CORONARY ARTERY BYPASS WITH RACHAEL ( ON PUMP);  Surgeon: Viet Singh MD;  Location:  OR     REPLACE VALVE AORTIC  10/16/2013    Procedure: REPLACE VALVE AORTIC;;  Surgeon: Viet Singh MD;  Location:  OR     SURGERY GENERAL IP CONSULT  05/2008 Excision aneurysm abdominal aorta     SURGERY GENERAL IP CONSULT  1997 Vocal cord polypectomy     VASCULAR SURGERY  1968, 1993     varicose vein stripping       Family History   Problem Relation Age of Onset      No Known Problems Mother      Coronary Artery Disease Father         CABG     Heart Disease Father         Pacemaker     No Known Problems Brother      No Known Problems Sister      No Known Problems Son      Other Cancer Daughter      No Known Problems Daughter      Heart Disease Brother      Other - See Comments Grandchild        Social History     Tobacco Use     Smoking status: Former Smoker     Packs/day: 1.00     Years: 40.00     Pack years: 40.00     Start date: 4/15/1962     Last attempt to quit: 10/23/2002     Years since quittin.3     Smokeless tobacco: Never Used   Substance Use Topics     Alcohol use: Yes     Frequency: 2-4 times a month     Drinks per session: 1 or 2     Comment: a couple beers per week (socially)     Phu BROOKS  [facility]  2/10/2020

## 2020-02-11 NOTE — PLAN OF CARE
Please see flowsheets for detailed vital signs and assessments.   Neuro: A&O  Vital Signs: stable  Pain: pt c/o mild RLE pain, declined intervention  O2: mid 90s RA, CPAP overnight  Tele: NA  Respiratory: LS WDL  GI: WDL  : voiding w/o difficulty  Skin: WDL ex wound to RLE with moderate amount of drainage, dressing clean/intact  Activity: SBA, pt refused assistance at times  IVF: heparin at 11 ml/hour  Notable labs: K 3.8  Protocols: NA  Consults: Ortho  Plan: IV antibiotics, anticoagulation on heparin gtt, prepare for surgery 2/12. Continue with plan of care.   Discharge: 2+ days

## 2020-02-11 NOTE — PLAN OF CARE
VSS on RA, A/OX4, LS clear, SBA w/ cane, reg diet, 2/10 leg pain, reg diet, hep gtt running at 12.5 ml/hr, R leg dressing changed this shift, discharge timeline undetermined.

## 2020-02-11 NOTE — PLAN OF CARE
A&OPx4, VSS, prn Tylenol for Right knee pain, Heparin gtt@8.5ml/hr, Coumadin on hold, Ortho consult, Regular diet, up SBA with cane, Vanco IV for possible wound infection on RLE, dressing CDI, plan I&D oon Wednesday, will continue with POC.

## 2020-02-12 ENCOUNTER — ANESTHESIA (OUTPATIENT)
Dept: SURGERY | Facility: CLINIC | Age: 80
DRG: 908 | End: 2020-02-12
Payer: MEDICARE

## 2020-02-12 ENCOUNTER — ANESTHESIA EVENT (OUTPATIENT)
Dept: SURGERY | Facility: CLINIC | Age: 80
DRG: 908 | End: 2020-02-12
Payer: MEDICARE

## 2020-02-12 PROBLEM — T81.30XA WOUND DEHISCENCE: Status: ACTIVE | Noted: 2020-02-12

## 2020-02-12 LAB
ERYTHROCYTE [DISTWIDTH] IN BLOOD BY AUTOMATED COUNT: 13 % (ref 10–15)
GRAM STN SPEC: ABNORMAL
GRAM STN SPEC: NORMAL
HCT VFR BLD AUTO: 36.4 % (ref 40–53)
HGB BLD-MCNC: 10.9 G/DL (ref 13.3–17.7)
HGB BLD-MCNC: 11.1 G/DL (ref 13.3–17.7)
INR PPP: 1.21 (ref 0.86–1.14)
INTERPRETATION ECG - MUSE: NORMAL
LMWH PPP CHRO-ACNC: <0.1 IU/ML
MCH RBC QN AUTO: 27.5 PG (ref 26.5–33)
MCHC RBC AUTO-ENTMCNC: 30.5 G/DL (ref 31.5–36.5)
MCV RBC AUTO: 90 FL (ref 78–100)
PLATELET # BLD AUTO: 324 10E9/L (ref 150–450)
RBC # BLD AUTO: 4.03 10E12/L (ref 4.4–5.9)
SPECIMEN SOURCE: ABNORMAL
SPECIMEN SOURCE: NORMAL
SPECIMEN SOURCE: NORMAL
VANCOMYCIN SERPL-MCNC: 18.1 MG/L
WBC # BLD AUTO: 12.5 10E9/L (ref 4–11)

## 2020-02-12 PROCEDURE — 80202 ASSAY OF VANCOMYCIN: CPT | Performed by: ORTHOPAEDIC SURGERY

## 2020-02-12 PROCEDURE — 25000128 H RX IP 250 OP 636: Performed by: NURSE ANESTHETIST, CERTIFIED REGISTERED

## 2020-02-12 PROCEDURE — 36415 COLL VENOUS BLD VENIPUNCTURE: CPT | Performed by: INTERNAL MEDICINE

## 2020-02-12 PROCEDURE — 85520 HEPARIN ASSAY: CPT | Performed by: INTERNAL MEDICINE

## 2020-02-12 PROCEDURE — 25800030 ZZH RX IP 258 OP 636: Performed by: NURSE ANESTHETIST, CERTIFIED REGISTERED

## 2020-02-12 PROCEDURE — 12000000 ZZH R&B MED SURG/OB

## 2020-02-12 PROCEDURE — 87076 CULTURE ANAEROBE IDENT EACH: CPT | Performed by: ORTHOPAEDIC SURGERY

## 2020-02-12 PROCEDURE — 36415 COLL VENOUS BLD VENIPUNCTURE: CPT | Performed by: ORTHOPAEDIC SURGERY

## 2020-02-12 PROCEDURE — 25800030 ZZH RX IP 258 OP 636: Performed by: ORTHOPAEDIC SURGERY

## 2020-02-12 PROCEDURE — 87070 CULTURE OTHR SPECIMN AEROBIC: CPT | Performed by: ORTHOPAEDIC SURGERY

## 2020-02-12 PROCEDURE — 25000132 ZZH RX MED GY IP 250 OP 250 PS 637: Mod: GY | Performed by: ORTHOPAEDIC SURGERY

## 2020-02-12 PROCEDURE — 25000128 H RX IP 250 OP 636

## 2020-02-12 PROCEDURE — 87075 CULTR BACTERIA EXCEPT BLOOD: CPT | Performed by: ORTHOPAEDIC SURGERY

## 2020-02-12 PROCEDURE — 27110028 ZZH OR GENERAL SUPPLY NON-STERILE: Performed by: ORTHOPAEDIC SURGERY

## 2020-02-12 PROCEDURE — 87205 SMEAR GRAM STAIN: CPT | Performed by: ORTHOPAEDIC SURGERY

## 2020-02-12 PROCEDURE — 71000015 ZZH RECOVERY PHASE 1 LEVEL 2 EA ADDTL HR: Performed by: ORTHOPAEDIC SURGERY

## 2020-02-12 PROCEDURE — 25000128 H RX IP 250 OP 636: Performed by: ANESTHESIOLOGY

## 2020-02-12 PROCEDURE — 37000008 ZZH ANESTHESIA TECHNICAL FEE, 1ST 30 MIN: Performed by: ORTHOPAEDIC SURGERY

## 2020-02-12 PROCEDURE — 87077 CULTURE AEROBIC IDENTIFY: CPT | Performed by: ORTHOPAEDIC SURGERY

## 2020-02-12 PROCEDURE — 87181 SC STD AGAR DILUTION PER AGT: CPT | Performed by: ORTHOPAEDIC SURGERY

## 2020-02-12 PROCEDURE — 25800030 ZZH RX IP 258 OP 636

## 2020-02-12 PROCEDURE — 85610 PROTHROMBIN TIME: CPT | Performed by: INTERNAL MEDICINE

## 2020-02-12 PROCEDURE — 71000014 ZZH RECOVERY PHASE 1 LEVEL 2 FIRST HR: Performed by: ORTHOPAEDIC SURGERY

## 2020-02-12 PROCEDURE — 25000128 H RX IP 250 OP 636: Performed by: ORTHOPAEDIC SURGERY

## 2020-02-12 PROCEDURE — 25000125 ZZHC RX 250: Performed by: NURSE ANESTHETIST, CERTIFIED REGISTERED

## 2020-02-12 PROCEDURE — 99233 SBSQ HOSP IP/OBS HIGH 50: CPT | Performed by: INTERNAL MEDICINE

## 2020-02-12 PROCEDURE — 87176 TISSUE HOMOGENIZATION CULTR: CPT | Performed by: ORTHOPAEDIC SURGERY

## 2020-02-12 PROCEDURE — 36000056 ZZH SURGERY LEVEL 3 1ST 30 MIN: Performed by: ORTHOPAEDIC SURGERY

## 2020-02-12 PROCEDURE — 37000009 ZZH ANESTHESIA TECHNICAL FEE, EACH ADDTL 15 MIN: Performed by: ORTHOPAEDIC SURGERY

## 2020-02-12 PROCEDURE — 27210794 ZZH OR GENERAL SUPPLY STERILE: Performed by: ORTHOPAEDIC SURGERY

## 2020-02-12 PROCEDURE — 36000058 ZZH SURGERY LEVEL 3 EA 15 ADDTL MIN: Performed by: ORTHOPAEDIC SURGERY

## 2020-02-12 PROCEDURE — 25800025 ZZH RX 258: Performed by: ORTHOPAEDIC SURGERY

## 2020-02-12 PROCEDURE — 25000125 ZZHC RX 250: Performed by: ANESTHESIOLOGY

## 2020-02-12 PROCEDURE — 25800030 ZZH RX IP 258 OP 636: Performed by: ANESTHESIOLOGY

## 2020-02-12 PROCEDURE — 85027 COMPLETE CBC AUTOMATED: CPT | Performed by: INTERNAL MEDICINE

## 2020-02-12 PROCEDURE — C1763 CONN TISS, NON-HUMAN: HCPCS | Performed by: ORTHOPAEDIC SURGERY

## 2020-02-12 PROCEDURE — 85018 HEMOGLOBIN: CPT | Performed by: INTERNAL MEDICINE

## 2020-02-12 PROCEDURE — 40000306 ZZH STATISTIC PRE PROC ASSESS II: Performed by: ORTHOPAEDIC SURGERY

## 2020-02-12 PROCEDURE — 25000132 ZZH RX MED GY IP 250 OP 250 PS 637: Mod: GY | Performed by: INTERNAL MEDICINE

## 2020-02-12 PROCEDURE — 87186 SC STD MICRODIL/AGAR DIL: CPT | Performed by: ORTHOPAEDIC SURGERY

## 2020-02-12 RX ORDER — KETOROLAC TROMETHAMINE 30 MG/ML
15 INJECTION, SOLUTION INTRAMUSCULAR; INTRAVENOUS EVERY 6 HOURS
Status: COMPLETED | OUTPATIENT
Start: 2020-02-12 | End: 2020-02-13

## 2020-02-12 RX ORDER — LIDOCAINE 40 MG/G
CREAM TOPICAL
Status: DISCONTINUED | OUTPATIENT
Start: 2020-02-12 | End: 2020-02-15 | Stop reason: HOSPADM

## 2020-02-12 RX ORDER — ONDANSETRON 4 MG/1
4 TABLET, ORALLY DISINTEGRATING ORAL EVERY 6 HOURS PRN
Status: DISCONTINUED | OUTPATIENT
Start: 2020-02-12 | End: 2020-02-15 | Stop reason: HOSPADM

## 2020-02-12 RX ORDER — CEFAZOLIN SODIUM 2 G/100ML
2 INJECTION, SOLUTION INTRAVENOUS EVERY 8 HOURS
Status: DISCONTINUED | OUTPATIENT
Start: 2020-02-12 | End: 2020-02-12

## 2020-02-12 RX ORDER — METOPROLOL TARTRATE 1 MG/ML
1-2 INJECTION, SOLUTION INTRAVENOUS EVERY 5 MIN PRN
Status: DISCONTINUED | OUTPATIENT
Start: 2020-02-12 | End: 2020-02-12 | Stop reason: HOSPADM

## 2020-02-12 RX ORDER — EPHEDRINE SULFATE 50 MG/ML
INJECTION, SOLUTION INTRAVENOUS PRN
Status: DISCONTINUED | OUTPATIENT
Start: 2020-02-12 | End: 2020-02-12

## 2020-02-12 RX ORDER — ONDANSETRON 2 MG/ML
4 INJECTION INTRAMUSCULAR; INTRAVENOUS EVERY 6 HOURS PRN
Status: DISCONTINUED | OUTPATIENT
Start: 2020-02-12 | End: 2020-02-15 | Stop reason: HOSPADM

## 2020-02-12 RX ORDER — PROPOFOL 10 MG/ML
INJECTION, EMULSION INTRAVENOUS PRN
Status: DISCONTINUED | OUTPATIENT
Start: 2020-02-12 | End: 2020-02-12

## 2020-02-12 RX ORDER — ACETAMINOPHEN 325 MG/1
975 TABLET ORAL EVERY 8 HOURS
Status: DISCONTINUED | OUTPATIENT
Start: 2020-02-12 | End: 2020-02-15 | Stop reason: HOSPADM

## 2020-02-12 RX ORDER — VANCOMYCIN HYDROCHLORIDE 1 G/20ML
INJECTION, POWDER, LYOPHILIZED, FOR SOLUTION INTRAVENOUS PRN
Status: DISCONTINUED | OUTPATIENT
Start: 2020-02-12 | End: 2020-02-12 | Stop reason: HOSPADM

## 2020-02-12 RX ORDER — WARFARIN SODIUM 5 MG/1
5 TABLET ORAL
Status: COMPLETED | OUTPATIENT
Start: 2020-02-12 | End: 2020-02-12

## 2020-02-12 RX ORDER — HYDRALAZINE HYDROCHLORIDE 20 MG/ML
2.5-5 INJECTION INTRAMUSCULAR; INTRAVENOUS EVERY 10 MIN PRN
Status: DISCONTINUED | OUTPATIENT
Start: 2020-02-12 | End: 2020-02-12 | Stop reason: HOSPADM

## 2020-02-12 RX ORDER — PHENYLEPHRINE HYDROCHLORIDE 10 MG/ML
INJECTION INTRAVENOUS PRN
Status: DISCONTINUED | OUTPATIENT
Start: 2020-02-12 | End: 2020-02-12

## 2020-02-12 RX ORDER — HEPARIN SODIUM 10000 [USP'U]/100ML
1500 INJECTION, SOLUTION INTRAVENOUS CONTINUOUS
Status: DISPENSED | OUTPATIENT
Start: 2020-02-12 | End: 2020-02-13

## 2020-02-12 RX ORDER — ACETAMINOPHEN 325 MG/1
650 TABLET ORAL EVERY 4 HOURS PRN
Status: DISCONTINUED | OUTPATIENT
Start: 2020-02-15 | End: 2020-02-15 | Stop reason: HOSPADM

## 2020-02-12 RX ORDER — HYDROMORPHONE HYDROCHLORIDE 1 MG/ML
0.2 INJECTION, SOLUTION INTRAMUSCULAR; INTRAVENOUS; SUBCUTANEOUS
Status: DISCONTINUED | OUTPATIENT
Start: 2020-02-12 | End: 2020-02-15 | Stop reason: HOSPADM

## 2020-02-12 RX ORDER — NALOXONE HYDROCHLORIDE 0.4 MG/ML
.1-.4 INJECTION, SOLUTION INTRAMUSCULAR; INTRAVENOUS; SUBCUTANEOUS
Status: DISCONTINUED | OUTPATIENT
Start: 2020-02-12 | End: 2020-02-12 | Stop reason: HOSPADM

## 2020-02-12 RX ORDER — ONDANSETRON 2 MG/ML
4 INJECTION INTRAMUSCULAR; INTRAVENOUS EVERY 30 MIN PRN
Status: DISCONTINUED | OUTPATIENT
Start: 2020-02-12 | End: 2020-02-12 | Stop reason: HOSPADM

## 2020-02-12 RX ORDER — LIDOCAINE 40 MG/G
CREAM TOPICAL
Status: DISCONTINUED | OUTPATIENT
Start: 2020-02-12 | End: 2020-02-12 | Stop reason: HOSPADM

## 2020-02-12 RX ORDER — HYDROMORPHONE HYDROCHLORIDE 1 MG/ML
.3-.5 INJECTION, SOLUTION INTRAMUSCULAR; INTRAVENOUS; SUBCUTANEOUS EVERY 10 MIN PRN
Status: DISCONTINUED | OUTPATIENT
Start: 2020-02-12 | End: 2020-02-12 | Stop reason: HOSPADM

## 2020-02-12 RX ORDER — ONDANSETRON 4 MG/1
4 TABLET, ORALLY DISINTEGRATING ORAL EVERY 30 MIN PRN
Status: DISCONTINUED | OUTPATIENT
Start: 2020-02-12 | End: 2020-02-12 | Stop reason: HOSPADM

## 2020-02-12 RX ORDER — CEFAZOLIN SODIUM 1 G/3ML
1 INJECTION, POWDER, FOR SOLUTION INTRAMUSCULAR; INTRAVENOUS SEE ADMIN INSTRUCTIONS
Status: DISCONTINUED | OUTPATIENT
Start: 2020-02-12 | End: 2020-02-12 | Stop reason: CLARIF

## 2020-02-12 RX ORDER — GABAPENTIN 100 MG/1
100 CAPSULE ORAL 3 TIMES DAILY
Status: DISCONTINUED | OUTPATIENT
Start: 2020-02-12 | End: 2020-02-15 | Stop reason: HOSPADM

## 2020-02-12 RX ORDER — NALOXONE HYDROCHLORIDE 0.4 MG/ML
.1-.4 INJECTION, SOLUTION INTRAMUSCULAR; INTRAVENOUS; SUBCUTANEOUS
Status: DISCONTINUED | OUTPATIENT
Start: 2020-02-12 | End: 2020-02-15 | Stop reason: HOSPADM

## 2020-02-12 RX ORDER — FENTANYL CITRATE 50 UG/ML
25-50 INJECTION, SOLUTION INTRAMUSCULAR; INTRAVENOUS
Status: DISCONTINUED | OUTPATIENT
Start: 2020-02-12 | End: 2020-02-12 | Stop reason: HOSPADM

## 2020-02-12 RX ORDER — OXYCODONE HYDROCHLORIDE 5 MG/1
5-10 TABLET ORAL
Status: DISCONTINUED | OUTPATIENT
Start: 2020-02-12 | End: 2020-02-15 | Stop reason: HOSPADM

## 2020-02-12 RX ORDER — OXYCODONE HYDROCHLORIDE 5 MG/1
5 TABLET ORAL EVERY 4 HOURS PRN
Status: DISCONTINUED | OUTPATIENT
Start: 2020-02-12 | End: 2020-02-12

## 2020-02-12 RX ORDER — FENTANYL CITRATE 50 UG/ML
25-50 INJECTION, SOLUTION INTRAMUSCULAR; INTRAVENOUS EVERY 5 MIN PRN
Status: DISCONTINUED | OUTPATIENT
Start: 2020-02-12 | End: 2020-02-12 | Stop reason: HOSPADM

## 2020-02-12 RX ORDER — SODIUM CHLORIDE, SODIUM LACTATE, POTASSIUM CHLORIDE, CALCIUM CHLORIDE 600; 310; 30; 20 MG/100ML; MG/100ML; MG/100ML; MG/100ML
INJECTION, SOLUTION INTRAVENOUS CONTINUOUS
Status: DISCONTINUED | OUTPATIENT
Start: 2020-02-12 | End: 2020-02-12 | Stop reason: HOSPADM

## 2020-02-12 RX ORDER — AMOXICILLIN 250 MG
2 CAPSULE ORAL 2 TIMES DAILY
Status: DISCONTINUED | OUTPATIENT
Start: 2020-02-12 | End: 2020-02-15 | Stop reason: HOSPADM

## 2020-02-12 RX ORDER — ALBUTEROL SULFATE 0.83 MG/ML
2.5 SOLUTION RESPIRATORY (INHALATION) EVERY 4 HOURS PRN
Status: DISCONTINUED | OUTPATIENT
Start: 2020-02-12 | End: 2020-02-12 | Stop reason: HOSPADM

## 2020-02-12 RX ORDER — METHOCARBAMOL 500 MG/1
500 TABLET, FILM COATED ORAL 4 TIMES DAILY PRN
Status: DISCONTINUED | OUTPATIENT
Start: 2020-02-12 | End: 2020-02-15 | Stop reason: HOSPADM

## 2020-02-12 RX ORDER — CEFAZOLIN SODIUM 2 G/100ML
2 INJECTION, SOLUTION INTRAVENOUS
Status: DISCONTINUED | OUTPATIENT
Start: 2020-02-12 | End: 2020-02-12

## 2020-02-12 RX ORDER — SODIUM CHLORIDE 9 MG/ML
INJECTION, SOLUTION INTRAVENOUS CONTINUOUS
Status: DISCONTINUED | OUTPATIENT
Start: 2020-02-12 | End: 2020-02-13

## 2020-02-12 RX ORDER — CEFAZOLIN SODIUM 1 G/50ML
1250 SOLUTION INTRAVENOUS EVERY 12 HOURS
Status: DISCONTINUED | OUTPATIENT
Start: 2020-02-12 | End: 2020-02-13

## 2020-02-12 RX ORDER — MEPERIDINE HYDROCHLORIDE 25 MG/ML
12.5 INJECTION INTRAMUSCULAR; INTRAVENOUS; SUBCUTANEOUS
Status: DISCONTINUED | OUTPATIENT
Start: 2020-02-12 | End: 2020-02-12 | Stop reason: HOSPADM

## 2020-02-12 RX ORDER — POLYETHYLENE GLYCOL 3350 17 G/17G
17 POWDER, FOR SOLUTION ORAL DAILY
Status: DISCONTINUED | OUTPATIENT
Start: 2020-02-12 | End: 2020-02-15 | Stop reason: HOSPADM

## 2020-02-12 RX ORDER — BISACODYL 10 MG
10 SUPPOSITORY, RECTAL RECTAL DAILY PRN
Status: DISCONTINUED | OUTPATIENT
Start: 2020-02-12 | End: 2020-02-15 | Stop reason: HOSPADM

## 2020-02-12 RX ADMIN — GABAPENTIN 100 MG: 100 CAPSULE ORAL at 20:39

## 2020-02-12 RX ADMIN — HYDROMORPHONE HYDROCHLORIDE 0.5 MG: 1 INJECTION, SOLUTION INTRAMUSCULAR; INTRAVENOUS; SUBCUTANEOUS at 12:10

## 2020-02-12 RX ADMIN — KETOROLAC TROMETHAMINE 15 MG: 30 INJECTION, SOLUTION INTRAMUSCULAR at 14:41

## 2020-02-12 RX ADMIN — PROPOFOL 150 MG: 10 INJECTION, EMULSION INTRAVENOUS at 10:07

## 2020-02-12 RX ADMIN — SODIUM CHLORIDE: 9 INJECTION, SOLUTION INTRAVENOUS at 14:42

## 2020-02-12 RX ADMIN — PHENYLEPHRINE HYDROCHLORIDE 100 MCG: 10 INJECTION INTRAVENOUS at 10:07

## 2020-02-12 RX ADMIN — DOCUSATE SODIUM 100 MG: 100 CAPSULE, LIQUID FILLED ORAL at 20:39

## 2020-02-12 RX ADMIN — ROSUVASTATIN CALCIUM 40 MG: 20 TABLET, FILM COATED ORAL at 20:39

## 2020-02-12 RX ADMIN — HYDROMORPHONE HYDROCHLORIDE 0.5 MG: 1 INJECTION, SOLUTION INTRAMUSCULAR; INTRAVENOUS; SUBCUTANEOUS at 12:50

## 2020-02-12 RX ADMIN — SODIUM CHLORIDE, POTASSIUM CHLORIDE, SODIUM LACTATE AND CALCIUM CHLORIDE: 600; 310; 30; 20 INJECTION, SOLUTION INTRAVENOUS at 09:46

## 2020-02-12 RX ADMIN — WARFARIN SODIUM 5 MG: 5 TABLET ORAL at 18:22

## 2020-02-12 RX ADMIN — HEPARIN SODIUM 1250 UNITS/HR: 10000 INJECTION, SOLUTION INTRAVENOUS at 16:58

## 2020-02-12 RX ADMIN — EPHEDRINE SULFATE 10 MG: 50 INJECTION, SOLUTION INTRAVENOUS at 10:14

## 2020-02-12 RX ADMIN — VANCOMYCIN HYDROCHLORIDE 1500 MG: 10 INJECTION, POWDER, LYOPHILIZED, FOR SOLUTION INTRAVENOUS at 03:37

## 2020-02-12 RX ADMIN — MESALAMINE 800 MG: 800 TABLET, DELAYED RELEASE ORAL at 20:38

## 2020-02-12 RX ADMIN — FENTANYL CITRATE 50 MCG: 50 INJECTION, SOLUTION INTRAMUSCULAR; INTRAVENOUS at 10:23

## 2020-02-12 RX ADMIN — ONDANSETRON 4 MG: 2 INJECTION INTRAMUSCULAR; INTRAVENOUS at 10:52

## 2020-02-12 RX ADMIN — GABAPENTIN 100 MG: 100 CAPSULE ORAL at 14:41

## 2020-02-12 RX ADMIN — POLYETHYLENE GLYCOL 3350 17 G: 17 POWDER, FOR SOLUTION ORAL at 14:42

## 2020-02-12 RX ADMIN — PHENYLEPHRINE HYDROCHLORIDE 0.2 MCG/KG/MIN: 10 INJECTION INTRAVENOUS at 10:11

## 2020-02-12 RX ADMIN — ACETAMINOPHEN 975 MG: 325 TABLET, FILM COATED ORAL at 14:40

## 2020-02-12 RX ADMIN — KETOROLAC TROMETHAMINE 15 MG: 30 INJECTION, SOLUTION INTRAMUSCULAR at 20:35

## 2020-02-12 RX ADMIN — FENTANYL CITRATE 50 MCG: 50 INJECTION, SOLUTION INTRAMUSCULAR; INTRAVENOUS at 10:51

## 2020-02-12 RX ADMIN — DOCUSATE SODIUM 100 MG: 100 CAPSULE, LIQUID FILLED ORAL at 07:46

## 2020-02-12 RX ADMIN — EZETIMIBE 10 MG: 10 TABLET ORAL at 20:40

## 2020-02-12 RX ADMIN — SENNOSIDES AND DOCUSATE SODIUM 1 TABLET: 8.6; 5 TABLET ORAL at 20:39

## 2020-02-12 RX ADMIN — FUROSEMIDE 20 MG: 20 TABLET ORAL at 07:46

## 2020-02-12 RX ADMIN — LIDOCAINE HYDROCHLORIDE 50 MG: 10 INJECTION, SOLUTION EPIDURAL; INFILTRATION; INTRACAUDAL; PERINEURAL at 10:07

## 2020-02-12 RX ADMIN — HYDROMORPHONE HYDROCHLORIDE 0.5 MG: 1 INJECTION, SOLUTION INTRAMUSCULAR; INTRAVENOUS; SUBCUTANEOUS at 11:27

## 2020-02-12 RX ADMIN — VANCOMYCIN HYDROCHLORIDE 1250 MG: 10 INJECTION, POWDER, LYOPHILIZED, FOR SOLUTION INTRAVENOUS at 15:56

## 2020-02-12 RX ADMIN — MESALAMINE 800 MG: 800 TABLET, DELAYED RELEASE ORAL at 07:46

## 2020-02-12 RX ADMIN — LIDOCAINE HYDROCHLORIDE 50 MG: 10 INJECTION, SOLUTION EPIDURAL; INFILTRATION; INTRACAUDAL; PERINEURAL at 11:14

## 2020-02-12 RX ADMIN — FENTANYL CITRATE 50 MCG: 50 INJECTION, SOLUTION INTRAMUSCULAR; INTRAVENOUS at 10:07

## 2020-02-12 RX ADMIN — FENTANYL CITRATE 50 MCG: 50 INJECTION, SOLUTION INTRAMUSCULAR; INTRAVENOUS at 10:01

## 2020-02-12 RX ADMIN — TAMSULOSIN HYDROCHLORIDE 0.4 MG: 0.4 CAPSULE ORAL at 20:39

## 2020-02-12 ASSESSMENT — ACTIVITIES OF DAILY LIVING (ADL)
ADLS_ACUITY_SCORE: 15
ADLS_ACUITY_SCORE: 20
ADLS_ACUITY_SCORE: 22
ADLS_ACUITY_SCORE: 22
ADLS_ACUITY_SCORE: 14

## 2020-02-12 ASSESSMENT — ENCOUNTER SYMPTOMS: DYSRHYTHMIAS: 1

## 2020-02-12 ASSESSMENT — LIFESTYLE VARIABLES: TOBACCO_USE: 1

## 2020-02-12 NOTE — PLAN OF CARE
"Pt back from right knee surgery around 1330. Vss. C/o right knee pain 8/10 when its moved. Knee immobilizer on with ice packs and ace wrap. CMS intact. Wbat. Lodr patent for urinary retention with anesthesia. A&OX4. Sats stable capnography monitor on. Pt does where capap at hs. May need while falling asleep. Tolerating liquids. Did not order food. \"im not hungry right now.\" IVF hanging but but not started as pt is taking liquids. And has hx of hf and on lasix. Declined side lying d/t right knee. Lab called with Gram + cocci in right knee fluid. Dr. Chang paged   "

## 2020-02-12 NOTE — ANESTHESIA POSTPROCEDURE EVALUATION
Patient: Fausto Carson Yordan    Procedure(s):  irrigation and debridement and placement of antibiotic beads, right knee    Diagnosis:Infected abrasion of knee [S80.219A, L08.9]  Diagnosis Additional Information: No value filed.    Anesthesia Type:  General, LMA    Note:  Anesthesia Post Evaluation    Patient location during evaluation: PACU  Patient participation: Able to fully participate in evaluation  Level of consciousness: awake  Pain management: adequate  Airway patency: patent  Cardiovascular status: acceptable  Respiratory status: acceptable  Hydration status: acceptable  PONV: controlled     Anesthetic complications: None          Last vitals:  Vitals:    02/12/20 1550 02/12/20 1553 02/12/20 1650   BP: (!) 92/38 97/60 91/59   Pulse:   101   Resp: 18  20   Temp: 96.5  F (35.8  C)  95.5  F (35.3  C)   SpO2: 98%  92%         Electronically Signed By: Sunil Mar MD  February 12, 2020  4:53 PM

## 2020-02-12 NOTE — PHARMACY-ANTICOAGULATION SERVICE
Clinical Pharmacy - Warfarin Dosing Consult     Pharmacy has been consulted to manage this patient s warfarin therapy.  Indication: Mechanical Mitral Valve Replacement  Therapy Goal: INR 2.5-3.5  Warfarin Prior to Admission: Yes  Warfarin PTA Regimen: 5mg Tue/Wed/Fri; 7.5mg ROW    INR   Date Value Ref Range Status   02/12/2020 1.21 (H) 0.86 - 1.14 Final   02/11/2020 1.60 (H) 0.86 - 1.14 Final       Recommend warfarin 5 mg today.  Pharmacy will monitor Fausto Alli Farr daily and order warfarin doses to achieve specified goal.      Please contact pharmacy as soon as possible if the warfarin needs to be held for a procedure or if the warfarin goals change.

## 2020-02-12 NOTE — PLAN OF CARE
Bedside RN in pacu was having a difficult time straight cathing patient with a coude catheter.  Patient has had to have a urologist in the past insert catheter.  Called Dr. Trevioz to make him aware of this situation and to get his assistance with inserting the catheter.   As RN on the phone with Dr. Trevizo, bedside RN was able to insert the coude cath.  Dr. Trevizo aware of this and stated to leave the catheter in until tomorrow, then discontinue it.  Bedside RN connected catheter to drainage bag and patient went to his room with indwelling catheter.  Dr. Mar, MDA also aware of this.

## 2020-02-12 NOTE — OR NURSING
Pt reports prior problem with urine retention. He said they had to call a specialist in to place a small the last time he was hospitalized. Bladder scan for 533 mL. Attempted to straight cath with 14Fr coude. Very slow and difficult insertion of catheter. Maintained sterile technique. Kept small in place and added standard bag. Charge nurse at the bedside for assistance. Pt using Flomax preoperative.

## 2020-02-12 NOTE — PLAN OF CARE
Patient is alert and oriented, up independently. Refuses bed alarms. Heparin gtt stopped at 0330 as patient is having surgery at 0930. NPO since midnight. PRN oxycodone and tylenol for pain. VSS, continue with plan of care.

## 2020-02-12 NOTE — PHARMACY-VANCOMYCIN DOSING SERVICE
Pharmacy Vancomycin Note  Date of Service 2020  Patient's  1940   79 year old, male    Indication: Skin and Soft Tissue Infection  Goal Trough Level: 10-15 mg/L  Day of Therapy: 3  Current Vancomycin regimen:  1,500 mg IV q12h    Current estimated CrCl = Estimated Creatinine Clearance: 74.5 mL/min (based on SCr of 0.85 mg/dL).    Creatinine for last 3 days  2/10/2020:  2:29 PM Creatinine 1.02 mg/dL  2020:  6:42 AM Creatinine 0.85 mg/dL    Recent Vancomycin Levels (past 3 days)  2020:  2:52 AM Vancomycin Level 18.1 mg/L    Vancomycin IV Administrations (past 72 hours)                   vancomycin 1500 mg in 0.9% NaCl 250 ml intermittent infusion 1,500 mg (mg) 1,500 mg Given 20 0337     1,500 mg Given 20 1646     1,500 mg Given  0516     1,500 mg Given 02/10/20 1724                Nephrotoxins and other renal medications (From now, onward)    Start     Dose/Rate Route Frequency Ordered Stop    20 1600  vancomycin (VANCOCIN) 1,250 mg in sodium chloride 0.9 % 250 mL intermittent infusion      1,250 mg  over 90 Minutes Intravenous EVERY 12 HOURS 20 1400      20 1345  ketorolac (TORADOL) injection 15 mg     Note to Pharmacy:  IF celecoxib was given pre-operatively, start ketorolac 12 hours after celecoxib given.    15 mg Intravenous EVERY 6 HOURS 20 1343 20 1344    20 0900  furosemide (LASIX) tablet 20 mg      20 mg Oral DAILY 02/10/20 1657               Contrast Orders - past 72 hours (72h ago, onward)    None          Interpretation of levels and current regimen:  Trough level is  Supratherapeutic    Has serum creatinine changed > 50% in last 72 hours: No    Urine output:  unable to determine    Renal Function: Stable    Plan:  1.  Decrease Dose to 1,250 mg q12h   2.  Pharmacy will check trough levels as appropriate in 1-3 Days.    3. Serum creatinine levels will be ordered a minimum of twice weekly.      Lina Wilkinson, [facility]        .

## 2020-02-12 NOTE — PLAN OF CARE
VSS, A/Ox4. Pain rated 3/10. Declined pain medications earlier in the shift. Oxycodone, tylenol, robaxin. Hep gtt infusing @ 12.5ml/hr. Left PIV saline locked. LS- clear/diminished. Coccyx red/blanchable. Pt moving independently in bed and in room. RT knee bandage clean, dry, intact. Vanco continued for infection. Surgery scheduled for 9:30am. Hep gtt should be discontinued 6 hrs prior to surgery. Regular diet w/ good appetite. NPO @ midnight. Pt refuses bed alarms. Contact isolation maintained.

## 2020-02-12 NOTE — ANESTHESIA PREPROCEDURE EVALUATION
Anesthesia Pre-Procedure Evaluation    Patient: Fausto Farr   MRN: [id number] : 1940          Preoperative Diagnosis: Infected abrasion of knee [S80.219A, L08.9]    Procedure(s):  irrigation and debridement and placement of antibiotic beads, right knee possible application of wound vac    Past Medical History:   Diagnosis Date     Abdominal pain      Abnormal ECG     RBBB, 1st degree AVB, Left axis deviation     Anemia     currently taking iron     Arrhythmia     pac, pvc     Back pain     since      BPH (benign prostatic hyperplasia)      Bruit      CAD (coronary artery disease)      Cellulitis 10/18/12     Cellulitis 2018    GrpB strep LLE cellulitis  negative RACHAEL for veg     Chronic venous insufficiency     bilat lower extremities     Contact dermatitis and other eczema, due to unspecified cause      Diaphragmatic hernia without mention of obstruction or gangrene      Diastolic HF (heart failure) (H)      Gastric ulcer      Glucose intolerance (impaired glucose tolerance)      Heart murmur 13    valvular heart disease     Hyperlipidemia LDL goal <100 2013     Hypertension 13     Lumbago      Malaise and fatigue      Metabolic syndrome      Mobitz (type) I (Wenckebach's) atrioventricular block     and RBBB     Nocturia 10/18/12     Nonallopathic lesion of cervical region      Nonallopathic lesion of lumbar region      Nonallopathic lesion of pelvic region, not elsewhere classified      Nonallopathic lesion of rib cage      Nonallopathic lesion of sacral region      GAGE (obstructive sleep apnea)     CPAP     Paroxysmal atrial fibrillation (H) 10/18/12     Prostate cancer (H)     radiation seed, XRT      PVD (peripheral vascular disease) (H)      RBBB     1st degree AVB, RBBB, LAHB     Rotator cuff strain     and sprain     S/P AAA repair      S/P aortic valve replacement     developed perivalve leak and MS, therefore redo surg      S/P CABG (coronary artery bypass graft)  2006    Aguirre-Lad, RA-Rca     Sciatica of left side     since 2000     Sepsis due to group B Streptococcus (H) 5/19/2018     Ulcerative colitis (H)      Varicose veins of bilateral lower extremities with other complications     s/p RLE vein stripping     Vitamin D deficiency      Past Surgical History:   Procedure Laterality Date     AORTIC VALVE REPLACEMENT  1/3/06    redo AVR SJM 21mm and SJM MVR 27mm in 2013SJM 21(AGFN 756):AVR, SJM 27 :MVR-     APPLY WOUND VAC N/A 11/12/2019    Procedure: APPLICATION, WOUND VAC;  Surgeon: Sara Martinez MD;  Location:  OR     ARTHROPLASTY KNEE      right knee     ARTHROPLASTY KNEE Right 7/22/2019    Procedure: Right total knee arthroplasty;  Surgeon: Prasanth Jensen MD;  Location:  OR     BACK SURGERY  Oct 2015    Fusion L4-5, laminectomy L2, L3     BYPASS GRAFT ARTERY CORONARY  10/2013    reimplantation of radial artery graft to RCA     C CABG, VEIN, TWO  1/3/06    Left radial to RCA, LIMA to LAD (RA to RCA reimplanted at time of 2013 surg)     CARDIAC CATHERIZATION  11/2005    Stent placed to RCA     CARDIAC CATHERIZATION  04/2013    Occluded RCA, patent LIMA to LAD and radial graft to PDA     CARPAL TUNNEL RELEASE RT/LT  1994     COLONOSCOPY  8-22-11     CYSTOSCOPY FLEXIBLE  10/16/2013    Procedure: CYSTOSCOPY FLEXIBLE;  FLEXIBLE CYSTOSCOPY / DILATION OF URETHRA / INSERTION OF LESLIE;  Surgeon: Cooper Wallace MD;  Location:  OR     ENDOVASCULAR REPAIR, INFRARENAL ABDOMINAL AORTIC ANEURYSM/DISSECTION; MODULAR BIFURCATED PROSTHESIS  2006    AAA repair endovascular     ENT SURGERY       EP ABLATION ATRIAL FLUTTER N/A 4/22/2019    Procedure: EP Ablation Atrial Flutter;  Surgeon: Jessy Vasquez MD;  Location:  [facility]ARD[facility] CATH LAB     GENITOURINARY SURGERY  6/16/08    radioactive seeding     GRAFT FLAP PEDICLE EXTREMITY (LOCATION) Right 11/12/2019    Procedure: SURGICAL PROCUREMENT, FLAP, PEDICLE, EXTREMITY;  Surgeon: Sara Martinez  MD Gretel;  Location: [facility]     GRAFT SKIN SPLIT THICKNESS FROM EXTREMITY Right 11/12/2019    Procedure: RIGHT GASTRONEMIUS FLAP TO RIGHT KNEE, SPLIT THICKNESS SKIN GRAFT FROM RIGHT THIGH TO RIGHT KNEE, SURGICAL PROCUREMENT, FLAP, PEDICLE, EXTREMITY, WOUND VAC PLACEMENT;  Surgeon: Sara Martinez MD;  Location:  OR     HEAD & NECK SURGERY  1997    vocal cord polypectomy     INCISION AND DRAINAGE KNEE, COMBINED Right 8/29/2019    Procedure: INCISION AND DRAINAGE, KNEE W/ Secondary Wound Closure;  Surgeon: Prasanth Jensen MD;  Location:  OR     IRRIGATION AND DEBRIDEMENT LOWER EXTREMITY, COMBINED Right 12/8/2019    Procedure: DEBRIDEMENT OF RIGHT CALF AND WOUND CLOSURE.;  Surgeon: Sara Martinez MD;  Location:  OR     KNEE SURGERY  2001 Right knee arthroscopy     OPTICAL TRACKING SYSTEM FUSION SPINE POSTERIOR LUMBAR THREE+ LEVELS N/A 10/29/2015    Procedure: OPTICAL TRACKING SYSTEM FUSION SPINE POSTERIOR LUMBAR THREE+ LEVELS;  Surgeon: Walt Garcia MD;  Location:  OR     PROSTATE SURGERY      radioactive seeding 6/16/08     REPAIR ANEURYSM ABDOMINAL AORTA  06/08     REPAIR VALVE MITRAL  10/16/2013    SJM 21(AGFN 756):AVR, SJM 27 :MVRProcedure: REPAIR VALVE MITRAL;  REDO STERNOTOMY/REDO AORTIC VALVE REPLACEMENT/ MITRAL VALVE REPLACEMENT/REIMPLANTATION OF RIGHT CORONARY ARTERY BYPASS WITH RACHAEL ( ON PUMP);  Surgeon: Viet Singh MD;  Location:  OR     REPLACE VALVE AORTIC  10/16/2013    Procedure: REPLACE VALVE AORTIC;;  Surgeon: Viet Singh MD;  Location:  OR     SURGERY GENERAL IP CONSULT  05/2008 Excision aneurysm abdominal aorta     SURGERY GENERAL IP CONSULT  1997 Vocal cord polypectomy     VASCULAR SURGERY  1968, 1993     varicose vein stripping     Anesthesia Evaluation     . Pt has had prior anesthetic.            ROS/MED HX    ENT/Pulmonary:     (+)sleep apnea, tobacco use, Past use uses CPAP , . .    Neurologic:       Cardiovascular:     (+)  Dyslipidemia, hypertension--CAD, -CABG-. Taking blood thinners : . CHF Last EF: 55 date: 5/18 . . :. dysrhythmias (s/p ablation) a-fib, valvular problems/murmurs (s/p MVR and AVR) . Previous cardiac testing date:results:Stress Testdate:4/19 results:Fixed inferior, inferiorseptal and apical defects date: results: date: results:          METS/Exercise Tolerance:     Hematologic:     (+) Anemia, -      Musculoskeletal:   (+) arthritis,  -       GI/Hepatic:        (-) GERD   Renal/Genitourinary:         Endo:     (+) Obesity, .      Psychiatric:         Infectious Disease:         Malignancy:         Other:                          Physical Exam      Airway   Mallampati: II  TM distance: >3 FB  Neck ROM: full    Dental     Cardiovascular   Rhythm and rate: normal      Pulmonary    breath sounds clear to auscultation            Lab Results   Component Value Date    WBC 9.7 02/10/2020    HGB 11.1 (L) 02/10/2020    HCT 35.7 (L) 02/10/2020     02/10/2020    CRP 15.0 (H) 02/10/2020    SED 43 (H) 02/10/2020     02/10/2020    POTASSIUM 3.8 02/10/2020    CHLORIDE 102 02/10/2020    CO2 30 02/10/2020    BUN 20 02/10/2020    CR 0.85 02/11/2020     (H) 02/10/2020    AWILDA 9.0 02/10/2020    PHOS 3.9 11/13/2019    MAG 2.0 11/13/2019    ALBUMIN 2.9 (L) 11/13/2019    PROTTOTAL 6.6 (L) 11/13/2019    ALT 18 11/13/2019    AST 22 11/13/2019    ALKPHOS 57 11/13/2019    BILITOTAL 0.3 11/13/2019    PTT 37 10/16/2013    INR 1.21 (H) 02/12/2020    FIBR 229 10/16/2013    TSH 0.54 11/13/2019    T4 0.79 11/21/2005       Preop Vitals  BP Readings from Last 3 Encounters:   02/12/20 135/81   02/07/20 100/60   01/10/20 125/56    Pulse Readings from Last 3 Encounters:   02/12/20 78   02/07/20 90   01/10/20 71      Resp Readings from Last 3 Encounters:   02/12/20 16   01/10/20 16   12/11/19 16    SpO2 Readings from Last 3 Encounters:   02/12/20 98%   02/07/20 98%   01/10/20 99%      Temp Readings from Last 1 Encounters:   02/12/20 97.2  " F (36.2  C) (Temporal)    Ht Readings from Last 1 Encounters:   02/10/20 1.664 m (5' 5.5\")      Wt Readings from Last 1 Encounters:   02/10/20 89.6 kg (197 lb 9.6 oz)    Estimated body mass index is 32.38 kg/m  as calculated from the following:    Height as of this encounter: 1.664 m (5' 5.5\").    Weight as of this encounter: 89.6 kg (197 lb 9.6 oz).       Anesthesia Plan      History & Physical Review  History and physical reviewed and following examination; no interval change.    ASA Status:  3 .    NPO Status:  > 8 hours    Plan for General and LMA with Intravenous and Propofol induction. Maintenance will be Balanced.    PONV prophylaxis:  Ondansetron (or other 5HT-3)       Postoperative Care  Postoperative pain management:  IV analgesics and Multi-modal analgesia.      Consents  Anesthetic plan, risks, benefits and alternatives discussed with:  Patient..                 Sunil Mar MD                    .  "

## 2020-02-12 NOTE — ANESTHESIA CARE TRANSFER NOTE
Patient: Fausto Carson Yordan    Procedure(s):  irrigation and debridement and placement of antibiotic beads, right knee    Diagnosis: Infected abrasion of knee [S80.219A, L08.9]  Diagnosis Additional Information: No value filed.    Anesthesia Type:   General, LMA     Note:  Airway :Face Mask  Patient transferred to:PACU  Comments: At end of procedure, spontaneous respirations, adequate tidal volumes, followed commands to voice, LMA removed atraumatically, oropharynx suctioned, airway patent after LMA removal. Oxygen via facemask at 6 liters per minute to PACU. Oxygen tubing connected to wall O2 in PACU, SpO2, NiBP, and EKG monitors and alarms on and functioning, report on patient's clinical status given to PACU RN, RN questions answered.Handoff Report: Identifed the Patient, Identified the Reponsible Provider, Reviewed the pertinent medical history, Discussed the surgical course, Reviewed Intra-OP anesthesia mangement and issues during anesthesia, Set expectations for post-procedure period and Allowed opportunity for questions and acknowledgement of understanding      Vitals: (Last set prior to Anesthesia Care Transfer)    CRNA VITALS  2/12/2020 1044 - 2/12/2020 1120      2/12/2020             NIBP:  143/87    Pulse:  89    NIBP Mean:  102    SpO2:  100 %    Resp Rate (observed):  (!) 3        135/81-86-%-97      Electronically Signed By: ADELE Linn CRNA  February 12, 2020  11:20 AM

## 2020-02-12 NOTE — PROGRESS NOTES
[facility]  Hospitalist Progress Note  Chaparrita Chnag MD 02/12/2020    Reason for Stay (Diagnosis): right knee complicated incisional wound         Assessment and Plan:      Summary of Stay: Fausto Farr is a 79 year old male with a history of mechanical mitral and aortic valve replacement and PAF on chronic AC with warfarin, history of TKA in July 2019 complicated by a chronic nonhealing wound.  He then underwent gastrocnemius muscle flap to that nonhealing area in November 2019.  That has now opened up with evidence of purulent drainage and so admitted on 2/10/2020 for bridging anticoagulation, status post I&D and placement of antibiotic needs of that right knee on 2/12/2020.    I reached out to Dr. Jensen and okay to resume heparin with typical boluses now.    Problem List:   1. Infected gastrocnemius muscle flap to prior TKA for nonhealing wound: Status post I&D and abx bead placement. Further abx based on results of cx  2. Mechanical mitral and Aortic valve replacement on chronic warfarin:  Warfarin held he was bridged with heparin and he underwent his procedure today.  Discussed with Dr. Jensen then will resume heparin drip with boluses.  3. Anticoagulation in the setting of recent surgery: At risk for bleeding.  Will check twice daily hemoglobins, check an ABO with his hemoglobin this evening, and will have conditional transfusion orders for hemoglobin less than 7.0.  Consent for blood products signed and put in front of chart  4. GAGE: On home CPAP  5. HLP: Continued home ezetimibe and rosuvastatin.  6. BPH continue home tamsulosin  7. Ulcerative colitis: We will continue with mesalamine as he was taking prior to admission  DVT Prophylaxis: Heparin drip  Code Status: Full Code  Functional Status: Independent  Diet: Regular  Lord: Placed in the OR  Access: 2 peripheral IVs    Disposition Plan   Expected discharge in per primary team days to TBD once anticoagulation plan in place no evidence  "of bleeding in the setting of recent surgery and infection under control with good plan for outpatient antibiotics/wound control.     Entered: Chaparrita Chang 02/12/2020, 3:37 PM               Interval History (Subjective):      A bit tired post surgery, and is having periodic pain in that knee.  No chest pain or shortness of breath.  Has not really started eating since surgery.                  Physical Exam:      Last Vital Signs:  /77 (BP Location: Right arm)   Pulse 89   Temp 95.9  F (35.5  C) (Oral)   Resp 14   Ht 1.664 m (5' 5.5\")   Wt 89.6 kg (197 lb 9.6 oz)   SpO2 92%   BMI 32.38 kg/m      I/O: Inaccurate    Pleasant no acute distress looks stated age head is normocephalic atraumatic and sclera are clear.  He is mildly pale in appearance sclera are clear except for some mild injection.  There is no icterus.  Lung's are clear to auscultation exhibits normal respiratory effort although it breathing is but shallow heart is regular without murmurs rubs or gallops abdominal exam is soft nontender nondistended skin is warm and dry and there is no cyanosis or clubbing of the extremities affect is appropriate and he is alert and oriented.           Medications:      All current medications were reviewed with changes reflected in problem list.         Data:      All new lab and imaging data was reviewed.   Labs:  Recent Labs   Lab 02/11/20  0642 02/10/20  1429   NA  --  136   POTASSIUM  --  3.8   CHLORIDE  --  102   CO2  --  30   ANIONGAP  --  4   GLC  --  142*   BUN  --  20   CR 0.85 1.02   GFRESTIMATED 83 70   GFRESTBLACK >90 81   AWILDA  --  9.0     Recent Labs   Lab 02/12/20  1519   WBC 12.5*   HGB 11.1*   HCT 36.4*   MCV 90        Recent Labs   Lab 02/12/20  1519 02/10/20  1511 02/10/20  1429   HGB 11.1* 11.1* 11.2*      Imaging:       "

## 2020-02-13 ENCOUNTER — HOME INFUSION (PRE-WILLOW HOME INFUSION) (OUTPATIENT)
Dept: PHARMACY | Facility: CLINIC | Age: 80
End: 2020-02-13

## 2020-02-13 LAB
ABO + RH BLD: NORMAL
ABO + RH BLD: NORMAL
ANION GAP SERPL CALCULATED.3IONS-SCNC: 3 MMOL/L (ref 3–14)
BUN SERPL-MCNC: 15 MG/DL (ref 7–30)
CALCIUM SERPL-MCNC: 8.4 MG/DL (ref 8.5–10.1)
CHLORIDE SERPL-SCNC: 104 MMOL/L (ref 94–109)
CO2 SERPL-SCNC: 30 MMOL/L (ref 20–32)
CREAT SERPL-MCNC: 0.97 MG/DL (ref 0.66–1.25)
GFR SERPL CREATININE-BSD FRML MDRD: 74 ML/MIN/{1.73_M2}
GLUCOSE SERPL-MCNC: 102 MG/DL (ref 70–99)
HGB BLD-MCNC: 10 G/DL (ref 13.3–17.7)
HGB BLD-MCNC: 10.2 G/DL (ref 13.3–17.7)
INR PPP: 1.27 (ref 0.86–1.14)
LMWH PPP CHRO-ACNC: 0.1 IU/ML
LMWH PPP CHRO-ACNC: 0.2 IU/ML
LMWH PPP CHRO-ACNC: <0.1 IU/ML
POTASSIUM SERPL-SCNC: 3.8 MMOL/L (ref 3.4–5.3)
SODIUM SERPL-SCNC: 137 MMOL/L (ref 133–144)
SPECIMEN EXP DATE BLD: NORMAL

## 2020-02-13 PROCEDURE — 27210209 ZZH KIT VALVED SINGLE LUMEN

## 2020-02-13 PROCEDURE — 25800030 ZZH RX IP 258 OP 636

## 2020-02-13 PROCEDURE — 25000128 H RX IP 250 OP 636: Performed by: INTERNAL MEDICINE

## 2020-02-13 PROCEDURE — 12000000 ZZH R&B MED SURG/OB

## 2020-02-13 PROCEDURE — 85610 PROTHROMBIN TIME: CPT | Performed by: INTERNAL MEDICINE

## 2020-02-13 PROCEDURE — 25000132 ZZH RX MED GY IP 250 OP 250 PS 637: Mod: GY | Performed by: ORTHOPAEDIC SURGERY

## 2020-02-13 PROCEDURE — 82565 ASSAY OF CREATININE: CPT | Performed by: INTERNAL MEDICINE

## 2020-02-13 PROCEDURE — 80048 BASIC METABOLIC PNL TOTAL CA: CPT | Performed by: INTERNAL MEDICINE

## 2020-02-13 PROCEDURE — 85018 HEMOGLOBIN: CPT | Performed by: INTERNAL MEDICINE

## 2020-02-13 PROCEDURE — 25000128 H RX IP 250 OP 636: Performed by: ORTHOPAEDIC SURGERY

## 2020-02-13 PROCEDURE — 36415 COLL VENOUS BLD VENIPUNCTURE: CPT | Performed by: ORTHOPAEDIC SURGERY

## 2020-02-13 PROCEDURE — 86900 BLOOD TYPING SEROLOGIC ABO: CPT | Performed by: INTERNAL MEDICINE

## 2020-02-13 PROCEDURE — 25000128 H RX IP 250 OP 636

## 2020-02-13 PROCEDURE — 36415 COLL VENOUS BLD VENIPUNCTURE: CPT | Performed by: INTERNAL MEDICINE

## 2020-02-13 PROCEDURE — 27211393 ZZH DRESSING, STATSEAL DISC

## 2020-02-13 PROCEDURE — 85520 HEPARIN ASSAY: CPT | Performed by: ORTHOPAEDIC SURGERY

## 2020-02-13 PROCEDURE — 36569 INSJ PICC 5 YR+ W/O IMAGING: CPT

## 2020-02-13 PROCEDURE — 99232 SBSQ HOSP IP/OBS MODERATE 35: CPT | Performed by: INTERNAL MEDICINE

## 2020-02-13 PROCEDURE — 85520 HEPARIN ASSAY: CPT | Performed by: INTERNAL MEDICINE

## 2020-02-13 PROCEDURE — 25000132 ZZH RX MED GY IP 250 OP 250 PS 637: Mod: GY | Performed by: INTERNAL MEDICINE

## 2020-02-13 PROCEDURE — 25000132 ZZH RX MED GY IP 250 OP 250 PS 637: Mod: GY

## 2020-02-13 PROCEDURE — 86901 BLOOD TYPING SEROLOGIC RH(D): CPT | Performed by: INTERNAL MEDICINE

## 2020-02-13 RX ORDER — WARFARIN SODIUM 7.5 MG/1
7.5 TABLET ORAL
Status: COMPLETED | OUTPATIENT
Start: 2020-02-13 | End: 2020-02-13

## 2020-02-13 RX ORDER — CEFTRIAXONE 2 G/1
2 INJECTION, POWDER, FOR SOLUTION INTRAMUSCULAR; INTRAVENOUS EVERY 24 HOURS
Status: DISCONTINUED | OUTPATIENT
Start: 2020-02-13 | End: 2020-02-14

## 2020-02-13 RX ADMIN — VANCOMYCIN HYDROCHLORIDE 1250 MG: 10 INJECTION, POWDER, LYOPHILIZED, FOR SOLUTION INTRAVENOUS at 04:12

## 2020-02-13 RX ADMIN — WARFARIN SODIUM 7.5 MG: 7.5 TABLET ORAL at 18:21

## 2020-02-13 RX ADMIN — ENOXAPARIN SODIUM 90 MG: 100 INJECTION SUBCUTANEOUS at 18:22

## 2020-02-13 RX ADMIN — HEPARIN SODIUM 1500 UNITS/HR: 10000 INJECTION, SOLUTION INTRAVENOUS at 09:44

## 2020-02-13 RX ADMIN — TAMSULOSIN HYDROCHLORIDE 0.4 MG: 0.4 CAPSULE ORAL at 20:13

## 2020-02-13 RX ADMIN — GABAPENTIN 100 MG: 100 CAPSULE ORAL at 13:39

## 2020-02-13 RX ADMIN — METHOCARBAMOL 500 MG: 500 TABLET ORAL at 20:12

## 2020-02-13 RX ADMIN — POLYETHYLENE GLYCOL 3350 17 G: 17 POWDER, FOR SOLUTION ORAL at 08:24

## 2020-02-13 RX ADMIN — ROSUVASTATIN CALCIUM 40 MG: 20 TABLET, FILM COATED ORAL at 20:12

## 2020-02-13 RX ADMIN — Medication 2000 UNITS: at 01:19

## 2020-02-13 RX ADMIN — KETOROLAC TROMETHAMINE 15 MG: 30 INJECTION, SOLUTION INTRAMUSCULAR at 02:32

## 2020-02-13 RX ADMIN — ACETAMINOPHEN 975 MG: 325 TABLET, FILM COATED ORAL at 13:40

## 2020-02-13 RX ADMIN — ACETAMINOPHEN 975 MG: 325 TABLET, FILM COATED ORAL at 06:15

## 2020-02-13 RX ADMIN — Medication 3000 UNITS: at 09:43

## 2020-02-13 RX ADMIN — GABAPENTIN 100 MG: 100 CAPSULE ORAL at 20:12

## 2020-02-13 RX ADMIN — ACETAMINOPHEN 975 MG: 325 TABLET, FILM COATED ORAL at 20:16

## 2020-02-13 RX ADMIN — FUROSEMIDE 20 MG: 20 TABLET ORAL at 08:23

## 2020-02-13 RX ADMIN — SENNOSIDES AND DOCUSATE SODIUM 2 TABLET: 8.6; 5 TABLET ORAL at 08:23

## 2020-02-13 RX ADMIN — DOCUSATE SODIUM 100 MG: 100 CAPSULE, LIQUID FILLED ORAL at 08:23

## 2020-02-13 RX ADMIN — KETOROLAC TROMETHAMINE 15 MG: 30 INJECTION, SOLUTION INTRAMUSCULAR at 08:22

## 2020-02-13 RX ADMIN — GABAPENTIN 100 MG: 100 CAPSULE ORAL at 08:23

## 2020-02-13 RX ADMIN — MESALAMINE 800 MG: 800 TABLET, DELAYED RELEASE ORAL at 20:12

## 2020-02-13 RX ADMIN — EZETIMIBE 10 MG: 10 TABLET ORAL at 20:12

## 2020-02-13 RX ADMIN — CEFTRIAXONE 2 G: 2 INJECTION, POWDER, FOR SOLUTION INTRAMUSCULAR; INTRAVENOUS at 13:38

## 2020-02-13 RX ADMIN — MESALAMINE 800 MG: 800 TABLET, DELAYED RELEASE ORAL at 08:23

## 2020-02-13 ASSESSMENT — ACTIVITIES OF DAILY LIVING (ADL)
ADLS_ACUITY_SCORE: 17
ADLS_ACUITY_SCORE: 18

## 2020-02-13 NOTE — OP NOTE
Procedure Date: 02/12/2020      PREOPERATIVE DIAGNOSIS:  Wound breakdown, possible infection, right total knee arthroplasty.      POSTOPERATIVE DIAGNOSIS:  Wound breakdown, possible infection, right total knee arthroplasty.      PROCEDURES:   1. Irrigation and debridement of wound breakdown, right total knee arthroplasty.   2. Irrigation and debridement of right total knee with placement of antibiotic-impregnated (vancomycin) absorbable antibiotic beads.      ANESTHESIA:  General.      SURGEON:  Prasanth Jensen MD.      FIRST ASSISTANT:  JEANINE Swenson      INDICATIONS:  This 79-year-old man had a total knee about a year ago.  His postoperative course was complicated by a wound breakdown at the inferior part of his incision.  He ultimately underwent a medial gastroc flap by Plastic Surgery.  He did well for a while and then had further drainage from the inferior aspect of the wound.  Appropriate risks and alternatives were discussed with him and his family, and it has been elected to proceed with surgical intervention.      DESCRIPTION OF PROCEDURE:  The patient was brought to the operating room.  His right knee was prepped and draped sterilely in the usual manner.  I should note with simple antibiotics that he has been on for a week, as well as some local wound care, his wound had decreased in size from about 2 cm to perhaps 1 cm with minimal evidence of drainage or erythema.  The knee itself that I previously tried to aspirate has no effusion.  Range of motion is 0-90 with no expression of fluid from the wound.      We did, however, go ahead and exsanguinate.  I ellipsed the draining sinus area.  We carried our dissection down.  It seemed to come from underneath the medial gastroc flap.  I therefore extended the incision proximally on the medial aspect of the flap and raised the flap up.  The flap was a very viable with good tissue.  There was extension of a pocket of not really purulence but tissue  space underneath the flap.  We obtained tissue for culture from this area.      We then explored deep where lateral to the patellar tendon, there was a small area of thinning, but there was no real communication with the joint.  We flexed and extended the knee, and there was no expression of fluid from the joint.  With this said, it was so thin that we elected to open the joint.      We cleaned things up sterilely and irrigated with antibiotic solution.  I then opened the joint.  We obtained deep tissue cultures from within the joint.  It appeared to be very clean with no evidence of infection.      After irrigating copiously with antibiotic solution, we placed vancomycin-impregnated absorbable antibiotic beads in the joint.  I then closed the arthrotomy with a watertight-type closure to complete the closure of the wound.  We were actually able to rotate the medial head of the gastroc flap inferiorly, and we were able to close the wound watertight with a nice sterile closure.  We did, however, place him in an immobilizer to take tension off the wound and avoid flexion for a while.  Wound was closed with interrupted nylon, as well as staples on the skin.  Sterile compressive dressing and knee immobilizer were applied.  The patient awakened and taken to the recovery room in good condition.  There were no intraoperative complications and minimal blood loss.            SONJA BARRAZA MD             D: 2020   T: 2020   MT:       Name:     LIANG VAUGHN   MRN:      [id number]        Account:        [id number]   :      1940           Procedure Date: 2020      Document: [id number]       cc: Sonja Barraza MD

## 2020-02-13 NOTE — PROGRESS NOTES
Therapy:IV ABX  Insurance:Medicare & BCBS supp  Pt has Medicare & BCBS supp, which does not cover IV ABX in the home. (Pt would have coverage for short term TCU or IC). Below is what pt would be responsible for if pt wanted to go w  home infusion               Drug would go to Part-D (pt would be responsible for the co-pay per dispense)            Pt would have to self-pay for the per-balta (daily)            If not homebound, nursing would also be self-pay (per visit)  Cost for Vanco 1250mg Q12 is roughly $96.47 per day for drug and supplies and if he is not homebound cost per nurse visit is roughly $155.00.    In reference to admission date 02/10/2020 [facility] to check IV ABX coverage.    Please contact Intake with any questions, [phone] or In Basket pool,  Home Infusion ([phone]).

## 2020-02-13 NOTE — PROGRESS NOTES
CM aware of possibility for home IV antibiotic treatment. Per RN note, cultures came back positive for gram positive cocci, haemophilus parainfluenzae, and staphylococcus aureus. ID has been consulted to determine future antibiotic treatment. CM sent request to [facility] for benefit check. He is currently on IV Vanco 1,250mg BID. Await response.     Met with patient at bedside. He did NOT get a wound vac placed during surgery yesterday. Updated him on status of treatment. Explained waiting for ID recommendations. He verbalized understanding of plan.     CM will continue to follow patient until discharge for any additional needs.     Ritu Casper RN, BSN, CPHN, CM  Inpatient Care Coordination  [facility]  [phone]    Addendum 1:40pm - Updated [facility] re: ID plan for treatment. Antibiotic should be changed to Ceftriaxone full course (not sure what that is) and a PICC should be placed. Will update patient when benefit check can provide CM with patient's cost. (Need to provide [facility] with antibiotic strength and length of course).   ds     Addendum 3:23pm - CM received email from [facility] that patient does NOT have coverage for home infusion. Dr. Leija changed his antibiotic to Ceftriaxone once daily for 6 weeks. He is able to go to an infusion center for the duration of his antibiotic course. Patient is open to [facility] for RN & PT services. Contacted Aline Walker - [facility] ([phone]). She verified that patient would be able to continue [facility] for RN & PT and go to OP infusion.   Still need (1) referral for OP Infusion AND (2) discharge order for antibiotic from Dr. Leija. Contacted his office to page him for orders.    rosemary

## 2020-02-13 NOTE — PLAN OF CARE
A&O. VSS. Pain controlled w/ scheduled pain mediations. CMS intact. Right knee immobilizer in place. On Capnography. 2L O2 NC. Heparin gtt @ 1300 units/hr. Lord in place. Assist x1/gait belt. WBAT. Continues on IV Vanco. Continue plan of care.  Noelle Morales RN on 2/13/2020 at 4:55 AM

## 2020-02-13 NOTE — CONSULTS
ID consult dictated IMP 1 78 yo male ongoing TKA wd now deep I and D , not infected looking but rapid + deep cx    REc PICC, to ceftriaxone, full tx as presumed deepinfection

## 2020-02-13 NOTE — PROGRESS NOTES
[facility]  Hospitalist Progress Note  Chaparrita Chang MD 02/13/2020    Reason for Stay (Diagnosis): right knee complicated incisional wound         Assessment and Plan:      Summary of Stay: Fausto Farr is a 79 year old male with a history of mechanical mitral and aortic valve replacement and PAF on chronic AC with warfarin, history of TKA in July 2019 complicated by a chronic nonhealing wound.  He then underwent gastrocnemius muscle flap to that nonhealing area in November 2019.  That has now opened up with evidence of purulent drainage and so admitted on 2/10/2020 for bridging anticoagulation, status post I&D and placement of antibiotic needs of that right knee on 2/12/2020.    I reached out to Dr. Jensen and pa to resume heparin with typical boluses now.    Problem List:   1. 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 Fluid culture + for haemophilus -appreciate ID input, plans at this stage are for ceftriaxone for 6 weeks (will need a PICC or midline) then likely long term oral therapy as must presume joint is infected as well.  I suspect he'll need that eventually explanted and then re-implanted- but will defer to ortho   2. Mechanical mitral and Aortic valve replacement on chronic warfarin:  Warfarin held he was bridged with heparin and he underwent his procedure today.  Tolerated heparin and warfarin resumed right after surgery.  And tolerating. Transitioned to enox 2/13/20 til INR therapeutic   3. Anticoagulation in the setting of recent surgery: At risk for bleeding.  Will check twice daily hemoglobins, and will have conditional transfusion orders for hemoglobin less than 7.0.  Consent for blood products signed and put in front of chart  4. GAGE: On home CPAP  5. HLP: Continued home ezetimibe and rosuvastatin.  6. BPH continue home tamsulosin  7. Ulcerative colitis: We will continue with mesalamine as he was taking  "prior to admission  DVT Prophylaxis: enox/warfarin overlap   Code Status: Full Code  Functional Status: Independent  Diet: Regular  Lord: Placed in the OR  Access: 2 peripheral IVs    Disposition Plan   Expected discharge in per primary team days to TBD once anticoagulation plan in place no evidence of bleeding in the setting of recent surgery and infection under control with good plan for outpatient antibiotics/wound control.     Entered: Chaparrita Chang 02/13/2020, 4:09 PM               Interval History (Subjective):      Feeling pretty good no cp or sob, no dizziness.  Good po intake without n/v/abdominal pain or diarrhea                  Physical Exam:      Last Vital Signs:  /66 (BP Location: Right arm)   Pulse 100   Temp 98.3  F (36.8  C) (Oral)   Resp 18   Ht 1.664 m (5' 5.5\")   Wt 89.6 kg (197 lb 9.6 oz)   SpO2 98%   BMI 32.38 kg/m      I/O: Inaccurate    Pleasant no acute distress looks stated age head is normocephalic atraumatic and sclera are clear.  Up in a chair and much more alert today sclera clear There is no icterus.  Lung's are clear to auscultation exhibits normal respiratory effort although it breathing is but shallow heart is regular without murmurs rubs or gallops abdominal exam is soft nontender nondistended skin is warm and dry and there is no cyanosis or clubbing of the extremities affect is appropriate and he is alert and oriented.           Medications:      All current medications were reviewed with changes reflected in problem list.         Data:      All new lab and imaging data was reviewed.   Labs:  Recent Labs   Lab 02/13/20  0639      POTASSIUM 3.8   CHLORIDE 104   CO2 30   ANIONGAP 3   *   BUN 15   CR 0.97   GFRESTIMATED 74   GFRESTBLACK 86   AWILDA 8.4*     Recent Labs   Lab 02/13/20  0639  02/12/20  1519   WBC  --   --  12.5*   HGB 10.2*   < > 11.1*   HCT  --   --  36.4*   MCV  --   --  90   PLT  --   --  324    < > = values in this interval not displayed. "     Recent Labs   Lab 02/13/20  0639 02/12/20  1746 02/12/20  1519   HGB 10.2* 10.9* 11.1*      Imaging:

## 2020-02-13 NOTE — PLAN OF CARE
"Vital signs:  Temp: 98.3  F (36.8  C) Temp src: Oral BP: 95/48 Pulse: 100 Heart Rate: 82 Resp: 18 SpO2: 98 % O2 Device: Nasal cannula Oxygen Delivery: 2 LPM Height: 166.4 cm (5' 5.5\") Weight: 89.6 kg (197 lb 9.6 oz)  Estimated body mass index is 32.38 kg/m  as calculated from the following:    Height as of this encounter: 1.664 m (5' 5.5\").    Weight as of this encounter: 89.6 kg (197 lb 9.6 oz).       0950 MD updated: Pt right knee cultures came back positive for gram positive cocci, haemophilus parainfluenzae, and staphylococcus aureus. Thanks!    A&O X4.  Pain controlled w/ scheduled pain mediations rates pain 3/10. See MAR. CMS intact. Right knee immobilizer in place. On Capnography. 2L O2 NC. Heparin gtt increased to 15mL/hr.  Lord in place to be removed at 1300. Assist x1/gait belt. WBAT. Continues on IV Vanco. Will continue to monitor.  "

## 2020-02-13 NOTE — PROCEDURES
[facility]    Single Lumen PICC Placement  Date/Time: 2/13/2020 2:46 PM  Performed by: Amalia Pulliam RN  Authorized by: Calderon Leija MD     UNIVERSAL PROTOCOL   Site Marked: Yes  Prior Images Obtained and Reviewed:  Yes  Required items: Required blood products, implants, devices and special equipment available    Patient identity confirmed:  Verbally with patient, arm band and hospital-assigned identification number  NA - No sedation, light sedation, or local anesthesia  Confirmation Checklist:  Patient's identity using two indicators, relevant allergies, procedure was appropriate and matched the consent or emergent situation and correct equipment/implants were available  Time out: Immediately prior to the procedure a time out was called (Time out performed with Liana ANDERSON)    Universal Protocol: the Joint Commission Universal Protocol was followed    Preparation: Patient was prepped and draped in usual sterile fashion           ANESTHESIA    Anesthesia: Local infiltration  Local Anesthetic:  Lidocaine 1% without epinephrine  Anesthetic Total (mL):  3      SEDATION    Patient Sedated: No        Preparation: skin prepped with ChloraPrep  Skin prep agent: skin prep agent completely dried prior to procedure  Sterile barriers: maximum sterile barriers were used: cap, mask, sterile gown, sterile gloves, and large sterile sheet  Hand hygiene: hand hygiene performed prior to central venous catheter insertion  Type of line used: PICC and Power PICC  Catheter type: single lumen  Lumen type: valved  Catheter size: 4 Fr  Brand: Bard  Lot number: [id number]  Placement method: venipuncture, MST, ultrasound and tip confirmation system  Number of attempts: 1  Successful placement: yes  Orientation: left  Location: basilic vein (vein diameter 0.60cm)  Arm circumference: adults 10 cm  Extremity circumference: 29.5  Visible catheter length: 6  Internal length: 45 cm  Total catheter length: 51  Dressing and securement:  blood cleaned with CHG, dressing applied, securement device, statlock and sterile dressing applied  Post procedure assessment: blood return through all ports  PROCEDURE   Patient Tolerance:  Patient tolerated the procedure well with no immediate complications  Describe Procedure: OK to use PICC line, verified with 3cg Tip Confirmation. JUSTNI Gaines informed ok to use. Pressure dressing and Stat seal disc placed due to bleeding.

## 2020-02-13 NOTE — PROGRESS NOTES
Discussed surgical findings with patient. Did not appear to be intraarticular infection. I did obtain intraarticular cultures and wash out the joint and place vancomycin beads. Closure is tight so will hold in full extention. Will have ID consult for recommendations on antibiotic coverage. Would favor IV course to cover soft tissue infection over TKA assuming intraarticular cultures neg.

## 2020-02-13 NOTE — CONSULTS
Consult Date:  02/13/2020      INFECTIOUS DISEASE CONSULTATION      LOCATION:  Room 302, [facility].      REFERRING PHYSICIAN:  Prasanth Jensen MD      IMPRESSION:   1.  A 79-year-old male with right total knee arthroplasty from July with ongoing wound issues ever since required a muscle flap closure, now ongoing drainage, not prior to this perceived to be infected and certainly not deep infected.  At surgery, it did not look like deep infection, but the joint cultures were rapidly and strongly positive for Haemophilus parainfluenza, almost certainly a true infection.  Clinical picture was suggestive of this already.  Wound culture has multiple other organisms including Staph aureus.   2.  Paroxysmal atrial fibrillation.   3.  Anemia.   4.  Coronary artery disease.   5.  Mechanical mitral and aortic valves.   6.  Remote history of MRSA.      RECOMMENDATIONS:   1.  Treat as deep infection.  Place PICC line with that plan in place.   2.  Presume Haemophilus is the main pathogen unless deep cultures also grow Staph aureus.  We will go to ceftriaxone for easy once a day dosing.  Plan on a full 6-week course, then likely long-term oral therapy assuming the joint as well.   3.  Discussed in detail with the patient.      HISTORY OF PRESENT ILLNESS:  This is a 79-year-old male seen in consultation due to concern for possible deep right total knee arthroplasty infection.  The patient underwent a revision arthroplasty in 07/2019.  Postop he developed wound issues, which have been a constant issue since and eventually had flap closure.  There was some drainage from it in December.  My partner, Dr. Floyd, saw, it was not perceived to be actual infection and no major antibiotics were given.  He has had ongoing drainage and what looks like a draining sinus tract.  At surgery, they did go into the joint.  It did not look infected deep, but now cultures from the deep area are growing Haemophilus parainfluenza while  wound culture is growing multiple organisms.  The patient has had no fevers, chills or sweats throughout this time.      PAST MEDICAL HISTORY:  No major infection problems historically, does have an MRSA infection in 2013, but not in any cultures recently.  History of paroxysmal atrial fibrillation, history of chronic anemia, history of prior mechanical aortic and mitral valves with anticoagulation as a result, history of coronary artery disease.      SOCIAL AND FAMILY HISTORY:  No recent travels or exposures.  No one else he has been around has been ill.  Does have the MRSA history.      MEDICATIONS:  As listed.      REVIEW OF SYSTEMS:  No significant fevers, chills or systemic symptoms.  Not really been that much pain in the joint itself leading into this.      PHYSICAL EXAMINATION:   GENERAL:  The patient appears stated age, does not look particularly toxic or ill.   VITAL SIGNS:  Currently normal, including being afebrile.   HEENT:  No thrush or intraoral lesions.  Pupils reactive.   NECK:  Supple and nontender.   HEART:  Regular rhythm, no major murmur.   LUNGS:  Clear bilaterally.   ABDOMEN:  Soft and nontender.   EXTREMITIES:  The joint is wrapped.  Rest of extremities are unremarkable.      LABORATORY DATA:  Mildly elevated inflammatory markers from a culture standpoint.  Current deep joint culture is growing Haemophilus parainfluenza.  The operative culture that is labeled fluid, right knee, is also growing Haemophilus parainfluenza.  Right knee tissue culture growing Staphylococcus aureus.  Prior cultures grew Staph aureus that was sensitive despite his prior history of MRSA.  A 02/03 aspiration culture grew an anaerobe, Staph aureus and group B strep, again not really perceived at that time to be infected.  Prior knee cultures from August grew Staph aureus that was sensitive.      Thank you very much for the consultation.  I will follow the patient with you.         DIPAK MONTILLA MD             D:  2020   T: 2020   MT: ABRAHAN      Name:     LIANG VAUGHN   MRN:      [id number]        Account:       [id number]   :      1940           Consult Date:  2020      Document: [id number]       cc: Jodi Jensen MD

## 2020-02-14 DIAGNOSIS — M00.80 ARTHRITIS DUE TO OTHER BACTERIA, SEPTIC ARTHRITIS OF UNSPECIFIED LOCATION (H): ICD-10-CM

## 2020-02-14 LAB
ANION GAP SERPL CALCULATED.3IONS-SCNC: 4 MMOL/L (ref 3–14)
BACTERIA SPEC CULT: ABNORMAL
BACTERIA SPEC CULT: ABNORMAL
BUN SERPL-MCNC: 10 MG/DL (ref 7–30)
CALCIUM SERPL-MCNC: 8.9 MG/DL (ref 8.5–10.1)
CHLORIDE SERPL-SCNC: 106 MMOL/L (ref 94–109)
CO2 SERPL-SCNC: 30 MMOL/L (ref 20–32)
CREAT SERPL-MCNC: 0.84 MG/DL (ref 0.66–1.25)
GFR SERPL CREATININE-BSD FRML MDRD: 84 ML/MIN/{1.73_M2}
GLUCOSE SERPL-MCNC: 109 MG/DL (ref 70–99)
HGB BLD-MCNC: 10.2 G/DL (ref 13.3–17.7)
INR PPP: 1.46 (ref 0.86–1.14)
POTASSIUM SERPL-SCNC: 3.9 MMOL/L (ref 3.4–5.3)
SODIUM SERPL-SCNC: 140 MMOL/L (ref 133–144)
SPECIMEN SOURCE: ABNORMAL

## 2020-02-14 PROCEDURE — 80048 BASIC METABOLIC PNL TOTAL CA: CPT | Performed by: INTERNAL MEDICINE

## 2020-02-14 PROCEDURE — 85018 HEMOGLOBIN: CPT | Performed by: INTERNAL MEDICINE

## 2020-02-14 PROCEDURE — 25000132 ZZH RX MED GY IP 250 OP 250 PS 637: Mod: GY | Performed by: INTERNAL MEDICINE

## 2020-02-14 PROCEDURE — 25000128 H RX IP 250 OP 636: Performed by: INTERNAL MEDICINE

## 2020-02-14 PROCEDURE — 12000000 ZZH R&B MED SURG/OB

## 2020-02-14 PROCEDURE — 40000257 ZZH STATISTIC CONSULT NO CHARGE VASC ACCESS

## 2020-02-14 PROCEDURE — 25000132 ZZH RX MED GY IP 250 OP 250 PS 637: Mod: GY | Performed by: ORTHOPAEDIC SURGERY

## 2020-02-14 PROCEDURE — 85610 PROTHROMBIN TIME: CPT | Performed by: INTERNAL MEDICINE

## 2020-02-14 PROCEDURE — 99232 SBSQ HOSP IP/OBS MODERATE 35: CPT | Performed by: INTERNAL MEDICINE

## 2020-02-14 RX ORDER — WARFARIN SODIUM 7.5 MG/1
7.5 TABLET ORAL ONCE
Status: COMPLETED | OUTPATIENT
Start: 2020-02-14 | End: 2020-02-14

## 2020-02-14 RX ORDER — CEFTRIAXONE 1 G/1
2000 INJECTION, POWDER, FOR SOLUTION INTRAMUSCULAR; INTRAVENOUS DAILY
Qty: 600 ML | Refills: 0 | Status: SHIPPED | OUTPATIENT
Start: 2020-02-14 | End: 2020-06-19

## 2020-02-14 RX ORDER — CEFTRIAXONE 2 G/1
2 INJECTION, POWDER, FOR SOLUTION INTRAMUSCULAR; INTRAVENOUS EVERY 24 HOURS
Status: DISCONTINUED | OUTPATIENT
Start: 2020-02-15 | End: 2020-02-15 | Stop reason: HOSPADM

## 2020-02-14 RX ORDER — CEFTRIAXONE 1 G/1
2000 INJECTION, POWDER, FOR SOLUTION INTRAMUSCULAR; INTRAVENOUS DAILY
Qty: 600 ML | Refills: 0 | Status: SHIPPED | OUTPATIENT
Start: 2020-02-14 | End: 2020-02-14

## 2020-02-14 RX ORDER — HEPARIN SODIUM,PORCINE 10 UNIT/ML
5 VIAL (ML) INTRAVENOUS
Status: CANCELLED | OUTPATIENT
Start: 2020-02-16

## 2020-02-14 RX ORDER — WARFARIN SODIUM 7.5 MG/1
7.5 TABLET ORAL
Status: DISCONTINUED | OUTPATIENT
Start: 2020-02-14 | End: 2020-02-14

## 2020-02-14 RX ORDER — HEPARIN SODIUM (PORCINE) LOCK FLUSH IV SOLN 100 UNIT/ML 100 UNIT/ML
5 SOLUTION INTRAVENOUS
Status: CANCELLED | OUTPATIENT
Start: 2020-02-16

## 2020-02-14 RX ORDER — CEFTRIAXONE SODIUM 2 G
2 VIAL (EA) INJECTION ONCE
Status: CANCELLED
Start: 2020-02-16

## 2020-02-14 RX ADMIN — ENOXAPARIN SODIUM: 100 INJECTION SUBCUTANEOUS at 20:22

## 2020-02-14 RX ADMIN — ACETAMINOPHEN 975 MG: 325 TABLET, FILM COATED ORAL at 21:52

## 2020-02-14 RX ADMIN — MESALAMINE 800 MG: 800 TABLET, DELAYED RELEASE ORAL at 20:26

## 2020-02-14 RX ADMIN — ACETAMINOPHEN 975 MG: 325 TABLET, FILM COATED ORAL at 05:41

## 2020-02-14 RX ADMIN — POLYETHYLENE GLYCOL 3350 17 G: 17 POWDER, FOR SOLUTION ORAL at 09:22

## 2020-02-14 RX ADMIN — WARFARIN SODIUM 7.5 MG: 7.5 TABLET ORAL at 17:51

## 2020-02-14 RX ADMIN — ENOXAPARIN SODIUM 90 MG: 100 INJECTION SUBCUTANEOUS at 06:55

## 2020-02-14 RX ADMIN — GABAPENTIN 100 MG: 100 CAPSULE ORAL at 14:03

## 2020-02-14 RX ADMIN — CEFTRIAXONE 2 G: 2 INJECTION, POWDER, FOR SOLUTION INTRAMUSCULAR; INTRAVENOUS at 12:44

## 2020-02-14 RX ADMIN — ROSUVASTATIN CALCIUM 40 MG: 20 TABLET, FILM COATED ORAL at 20:26

## 2020-02-14 RX ADMIN — OXYCODONE HYDROCHLORIDE 5 MG: 5 TABLET ORAL at 09:22

## 2020-02-14 RX ADMIN — OXYCODONE HYDROCHLORIDE 5 MG: 5 TABLET ORAL at 01:53

## 2020-02-14 RX ADMIN — MESALAMINE 800 MG: 800 TABLET, DELAYED RELEASE ORAL at 09:22

## 2020-02-14 RX ADMIN — OXYCODONE HYDROCHLORIDE 5 MG: 5 TABLET ORAL at 23:45

## 2020-02-14 RX ADMIN — GABAPENTIN 100 MG: 100 CAPSULE ORAL at 09:21

## 2020-02-14 RX ADMIN — GABAPENTIN 100 MG: 100 CAPSULE ORAL at 20:26

## 2020-02-14 RX ADMIN — TAMSULOSIN HYDROCHLORIDE 0.4 MG: 0.4 CAPSULE ORAL at 20:26

## 2020-02-14 RX ADMIN — ACETAMINOPHEN 975 MG: 325 TABLET, FILM COATED ORAL at 12:44

## 2020-02-14 RX ADMIN — EZETIMIBE 10 MG: 10 TABLET ORAL at 20:26

## 2020-02-14 RX ADMIN — FUROSEMIDE 20 MG: 20 TABLET ORAL at 09:22

## 2020-02-14 ASSESSMENT — ACTIVITIES OF DAILY LIVING (ADL)
SWALLOWING: 0-->SWALLOWS FOODS/LIQUIDS WITHOUT DIFFICULTY
DRESS: 0-->INDEPENDENT
ADLS_ACUITY_SCORE: 18
BATHING: 0-->INDEPENDENT
ADLS_ACUITY_SCORE: 18
AMBULATION: 0-->INDEPENDENT
ADLS_ACUITY_SCORE: 18
FALL_HISTORY_WITHIN_LAST_SIX_MONTHS: NO
ADLS_ACUITY_SCORE: 18
ADLS_ACUITY_SCORE: 16
ADLS_ACUITY_SCORE: 18
COGNITION: 0 - NO COGNITION ISSUES REPORTED
TOILETING: 0-->INDEPENDENT
RETIRED_COMMUNICATION: 0-->UNDERSTANDS/COMMUNICATES WITHOUT DIFFICULTY
RETIRED_EATING: 0-->INDEPENDENT
TRANSFERRING: 0-->INDEPENDENT

## 2020-02-14 NOTE — PLAN OF CARE
"Vital signs:  Temp: 97.2  F (36.2  C) Temp src: Oral BP: 104/57 Pulse: 86 Heart Rate: 75 Resp: 16 SpO2: 94 % O2 Device: None (Room air) Oxygen Delivery: 2 LPM Height: 166.4 cm (5' 5.5\") Weight: 89.6 kg (197 lb 9.6 oz)  Estimated body mass index is 32.38 kg/m  as calculated from the following:    Height as of this encounter: 1.664 m (5' 5.5\").    Weight as of this encounter: 89.6 kg (197 lb 9.6 oz).    2885 MD updated: Pharmacy does not have an order for Lovenox at discharge. Is this something that you would like to continue after discharge? Thanks!     A&O X4.  C/O pain rated 5/10- PRN oxy given See MAR. CMS intact. Right knee immobilizer in place.  Lord in place to be removed yesterday, voiding well spontaneously. Assist x1/gait belt. WBAT. Continues on IV Rocephin. Plan to discharge to home today with outpatient infusion for 6 weeks then switching to oral antibiotics. Care coordinator to meet with pt and coordinate schedule. Discharging on coumadin with Lovenox as bridge. Pt had two loose BM's last shift, most bowl meds held. Will discharge with PICC to left arm. PIV to left arm removed. Will continue to monitor.    3535 update: Per  pt not to discharge until Dr. Leija has seen.   "

## 2020-02-14 NOTE — PHARMACY-ANTICOAGULATION SERVICE
Clinical Pharmacy- Warfarin Discharge Note  This patient is currently on warfarin for the treatment of Mechanical heart valve.  INR Goal= 2.5-3.5  Expected length of therapy per PCP.    Warfarin PTA Regimen: 5mg Tue/Wed/Fri; 7.5mg ROW      Anticoagulation Dose History     Recent Dosing and Labs Latest Ref Rng & Units 1/28/2020 2/7/2020 2/10/2020 2/11/2020 2/12/2020 2/13/2020 2/14/2020    Warfarin 5 mg - - - - - 5 mg - -    Warfarin 7.5 mg - - - - - - 7.5 mg -    INR 0.86 - 1.14 3.4(A) - 2.06(H) 1.60(H) 1.21(H) 1.27(H) 1.46(H)    INR 0.86 - 1.14 - 2.2(A) - - - - -    INR Point of Care 0.86 - 1.14 - - - - - - -            Recommend discharging the patient on a warfarin regimen of 7.5 mg today prior to discharge then resume PTA Warfarin regemin of 5mg Tues Wed Fri & 7.5mg on all other days of the week.  Pt to use PTA RX supply.       The patient should have an INR checked on Monday 2/17/2020.    Mignon Coronado, PharmD  February 14, 2020

## 2020-02-14 NOTE — PROGRESS NOTES
CM contacted [facility] ([phone]) to request discharge antibiotic order and OP Infusion Referral be entered by Dr. Leija. Provided CM contact information. Await orders or return call.     CM will continue to follow patient until discharge for any additional needs.     Ritu Casper, RN, BSN, CPHN, CM  Inpatient Care Coordination  [facility]  [phone]    Addendum 8:40am - CM received return call from Dr. Leija. He will review patient's chart and determine if he is discharge ready. He will put in chart note with his recommendations. Ds    Addendum 10:20am - Per conversation with hospitalist, awaiting culture results. Determination of final antibiotic is still pending per ID. CM unable to arrange OP Infusion schedule w/o antibiotic & OP Infusion referral in discharge orders. Will set up infusion schedule once orders complete.    Ds     Addendum 3:30pm- Patient to discharge tonight if OP Infusion can be scheduled for tomorrow, Saturday. Contacted [facility]fusion ([phone]). Requested directions for patient. Updated AVS. Updated bedside RN to reprint AVS. RN not on Vocera. LM on Vocera & contacted unit HUC to notify RN.    rosemary

## 2020-02-14 NOTE — PROGRESS NOTES
Appreciate help from ID. Patient is comfortable and ambulatory,    Plan: If he is stable off heparin and arrangements can be made for out patient IV antibiotics he can go home. F/U to my office 7-10 days. Discussed.

## 2020-02-14 NOTE — PROGRESS NOTES
[facility]  Hospitalist Progress Note  Chaparrita Chang MD 02/14/2020    Reason for Stay (Diagnosis): right knee complicated incisional wound         Assessment and Plan:      Summary of Stay: Fausto Farr is a 79 year old male with a history of mechanical mitral and aortic valve replacement and PAF on chronic AC with warfarin, history of TKA in July 2019 complicated by a chronic nonhealing wound.  He then underwent gastrocnemius muscle flap to that nonhealing area in November 2019.  That has now opened up with evidence of purulent drainage and so admitted on 2/10/2020 for bridging anticoagulation, status post I&D and placement of antibiotic needs of that right knee on 2/12/2020.        Problem List:   1. 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 Fluid culture + for haemophilus parainfluenza and staph aureus.-appreciate ID input, plans at this stage are for ceftriaxone for 6 weeks -PIC placed 2/13/2020, possibly adding rifampin p.o. next week, then likely long term oral therapy as must presume joint is infected as well.  I suspect he'll need that eventually explanted and then re-implanted- but will defer to ortho.  We will plan for discharge tomorrow after his IV antibiotic  2. Mechanical mitral and Aortic valve replacement on chronic warfarin:  Warfarin held he was bridged with heparin and he underwent his procedure today.  Tolerated heparin and warfarin resumed right after surgery.  And tolerating. Transitioned to enox 2/13/20 til INR therapeutic 2.5-3.5  3. Anticoagulation in the setting of recent surgery: At risk for bleeding.  Will check twice daily hemoglobins, and will have conditional transfusion orders for hemoglobin less than 7.0.  Consent for blood products signed and put in front of chart.  Has not required any transfusions hemoglobin stable in the 10 range  4. GAGE: On home CPAP  5. HLP: Continued home ezetimibe and  "rosuvastatin.  6. BPH continue home tamsulosin  7. Ulcerative colitis: We will continue with mesalamine as he was taking prior to admission  DVT Prophylaxis: enox/warfarin overlap   Code Status: Full Code  Functional Status: Independent  Diet: Regular  Lord: Placed in the OR  Access: 2 peripheral IVs    Disposition Plan   Expected discharge in per primary team days to TBD once anticoagulation plan in place no evidence of bleeding in the setting of recent surgery and infection under control with good plan for outpatient antibiotics/wound control.     Entered: Chaparrita MAHSACatalina Chang 02/14/2020, 3:32 PM               Interval History (Subjective):      Feeling pretty good no cp or sob, no dizziness.  Good po intake without n/v/abdominal pain or diarrhea                  Physical Exam:      Last Vital Signs:  /57 (BP Location: Right arm)   Pulse 86   Temp 97.2  F (36.2  C) (Oral)   Resp 16   Ht 1.664 m (5' 5.5\")   Wt 89.6 kg (197 lb 9.6 oz)   SpO2 94%   BMI 32.38 kg/m      I/O: Inaccurate    Pleasant no acute distress looks stated age head is normocephalic atraumatic and sclera are clear.  Up in a chair and much more alert today sclera clear There is no icterus.  Lung's are clear to auscultation exhibits normal respiratory effort although it breathing is but shallow heart is regular without murmurs rubs or gallops abdominal exam is soft nontender nondistended skin is warm and dry and there is no cyanosis or clubbing of the extremities affect is appropriate and he is alert and oriented.           Medications:      All current medications were reviewed with changes reflected in problem list.         Data:      All new lab and imaging data was reviewed.   Labs:  Recent Labs   Lab 02/14/20  0530      POTASSIUM 3.9   CHLORIDE 106   CO2 30   ANIONGAP 4   *   BUN 10   CR 0.84   GFRESTIMATED 84   GFRESTBLACK >90   AWILDA 8.9     Recent Labs   Lab 02/14/20  0530  02/12/20  1519   WBC  --   --  12.5*   HGB 10.2*   " < > 11.1*   HCT  --   --  36.4*   MCV  --   --  90   PLT  --   --  324    < > = values in this interval not displayed.     Recent Labs   Lab 02/14/20  0530 02/13/20  1758 02/13/20  0639   HGB 10.2* 10.0* 10.2*      Imaging:

## 2020-02-14 NOTE — PLAN OF CARE
VSS. A/Ox4. Ice packs applied for pain in knee. Left PIV infusing hep gtt @ 15ml/hr. Gtt will be discontinued at 8pm. Coumadin and lovenox given. Left PICC placed today and saline locked. Pressure dressing left on PICC due to  vascular access wanting it to be left alone. Dressing for PICC will be changed tomorrorow. LS- clear. Regular diet w/ good appetite. Coccyx red/blanchable. Pt able to reposition self in bed. Pt refusing bed alarms/assistance w/transferring. Unable to assess left knee incision w/bandage and immobilizer on. Surgery will change dressing on knee. Pt had one stool today and voiding ok after catheter was removed. ID following. Possible discharge tomorrow.

## 2020-02-14 NOTE — PLAN OF CARE
A&O. VSS.  PRN oxy and  scheduled Tylenol given for R knee pain. CMS intact. Immobilizer in place.  CPAP on at night.  Independent in room. ID following. Left PICC in place. Receiving IV rocephin. Possible discharge today.  Noelle Morales RN on 2/14/2020 at 4:54 AM

## 2020-02-14 NOTE — PROGRESS NOTES
[facility]  Infectious Disease Progress Note          Assessment and Plan:   IMPRESSION:   1.  A 79-year-old male with right total knee arthroplasty from July with ongoing wound issues ever since required a muscle flap closure, now ongoing drainage, not prior to this perceived to be infected and certainly not deep infected.  At surgery, it did not look like deep infection, but the joint cultures were rapidly and strongly positive for Haemophilus parainfluenza, almost certainly a true infection.  Clinical picture was suggestive of this already.  Wound culture has multiple other organisms including Staph aureus.   2.  Paroxysmal atrial fibrillation.   3.  Anemia.   4.  Coronary artery disease.   5.  Mechanical mitral and aortic valves.   6.  Remote history of MRSA.      RECOMMENDATIONS:   1. learly  Treat as deep infection.  Place PICC line   2.  Presume Haemophilus is the main pathogen but  deep cultures also growing  Staph aureus.  We will go to ceftriaxone for easy once a day dosing.  Plan on a full 6-week course, then likely long-term oral therapy assuming the joint as well.   3.  Discussed in detail with the patient and wife  4 Orders in, likely add Rif po next week.         Interval History:   no new complaints and doing well; no cp, sob, n/v/d, or abd pain. X also MSSA?and H flu              Medications:       acetaminophen  975 mg Oral Q8H     cefTRIAXone  2 g Intravenous Q24H     docusate sodium  100 mg Oral BID     enoxaparin ANTICOAGULANT  1 mg/kg Subcutaneous Q12H     ezetimibe  10 mg Oral QPM     furosemide  20 mg Oral Daily     gabapentin  100 mg Oral TID     mesalamine  800 mg Oral BID     polyethylene glycol  17 g Oral Daily     rosuvastatin  40 mg Oral QPM     senna-docusate  2 tablet Oral BID     sodium chloride (PF)  3 mL Intracatheter Q8H     sodium chloride (PF)  3 mL Intracatheter Q8H     tamsulosin  0.4 mg Oral QPM     warfarin ANTICOAGULANT  7.5 mg Oral Once               "    Physical Exam:   Blood pressure 104/57, pulse 86, temperature 97.2  F (36.2  C), temperature source Oral, resp. rate 16, height 1.664 m (5' 5.5\"), weight 89.6 kg (197 lb 9.6 oz), SpO2 94 %.  Wt Readings from Last 2 Encounters:   02/10/20 89.6 kg (197 lb 9.6 oz)   02/07/20 90.2 kg (198 lb 14.4 oz)     Vital Signs with Ranges  Temp:  [97.2  F (36.2  C)-98.3  F (36.8  C)] 97.2  F (36.2  C)  Pulse:  [86] 86  Heart Rate:  [75-79] 75  Resp:  [16-18] 16  BP: (104-120)/(57-66) 104/57  SpO2:  [94 %-98 %] 94 %    Constitutional: Awake, alert, cooperative, no apparent distress   Lungs: Clear to auscultation bilaterally, no crackles or wheezing   Cardiovascular: Regular rate and rhythm, normal S1 and S2, and no murmur noted   Abdomen: Normal bowel sounds, soft, non-distended, non-tender   Skin: No rashes, no cyanosis, no edema   Other:           Data:   All microbiology laboratory data reviewed.  Recent Labs   Lab Test 02/14/20 0530 02/13/20  1758 02/13/20  0639  02/12/20  1519 02/10/20  1511 02/10/20  1429   WBC  --   --   --   --  12.5* 9.7 9.8   HGB 10.2* 10.0* 10.2*   < > 11.1* 11.1* 11.2*   HCT  --   --   --   --  36.4* 35.7* 35.6*   MCV  --   --   --   --  90 89 89   PLT  --   --   --   --  324 339 347    < > = values in this interval not displayed.     Recent Labs   Lab Test 02/14/20 0530 02/13/20  0639 02/11/20  0642   CR 0.84 0.97 0.85     Recent Labs   Lab Test 02/10/20  1511   SED 43*     Recent Labs   Lab Test 02/12/20  1033 02/12/20  1032 02/12/20  1029 02/10/20  1516 02/10/20  1510 02/03/20  1250 08/29/19  1311 06/14/18  0904 05/22/18  0535   CULT Light growth  Haemophilus parainfluenzae  *  On day 2, isolated in broth only:  Gram positive cocci  *  Culture negative monitoring continues Light growth  Haemophilus parainfluenzae  Susceptibility testing done on previous specimen  *  Critical Value/Significant Value, preliminary result only, called to and read back by  Liana Wolf RN from [facility]. " 2/13/20 at 0958.TV.    Culture negative monitoring continues Light growth  Staphylococcus aureus  Susceptibility testing in progress  *  Light growth  Haemophilus parainfluenzae  Susceptibility testing in progress  *  Culture in progress  Culture negative monitoring continues No growth after 4 days No growth after 4 days Moderate growth  Peptoniphilus asaccharolyticus  Susceptibility testing not routinely done  *  Heavy growth  Staphylococcus aureus  *  Heavy growth  Streptococcus agalactiae sero group B  *  Heavy growth  Corynebacterium striatum  Identification obtained by MALDI-TOF mass spectrometry research use only database. Test   characteristics determined and verified by the [facility]   ([facility]) [place], [place].  Susceptibility testing not routinely done  * Moderate growth  Finegoldia magna (Peptostreptococcus de)  *  Critical Value/Significant Value called to and read back by  IMANI ISLAS [facility] 8.31.19 1405. PAULETTE    Heavy growth  Finegoldia magna (Peptostreptococcus de)  *  Susceptibility testing done on previous specimen  Moderate growth  Staphylococcus aureus  *  Critical Value/Significant Value, preliminary result only, called to and read back by  SUKHDEV JIMENEZ [facility]  8.30.19 1137. PAULETTE    Moderate growth  Staphylococcus aureus  Susceptibility testing done on previous specimen  * No MRSA isolated: susceptibilities not available. PCR assay is more sensitive than   culture.   No growth

## 2020-02-15 VITALS
OXYGEN SATURATION: 98 % | HEART RATE: 86 BPM | HEIGHT: 66 IN | RESPIRATION RATE: 20 BRPM | SYSTOLIC BLOOD PRESSURE: 116 MMHG | DIASTOLIC BLOOD PRESSURE: 57 MMHG | TEMPERATURE: 97.7 F | BODY MASS INDEX: 31.76 KG/M2 | WEIGHT: 197.6 LBS

## 2020-02-15 LAB
BACTERIA SPEC CULT: ABNORMAL
CREAT SERPL-MCNC: 0.87 MG/DL (ref 0.66–1.25)
GFR SERPL CREATININE-BSD FRML MDRD: 82 ML/MIN/{1.73_M2}
HGB BLD-MCNC: 9.9 G/DL (ref 13.3–17.7)
INR PPP: 1.58 (ref 0.86–1.14)
SPECIMEN SOURCE: ABNORMAL

## 2020-02-15 PROCEDURE — 25000132 ZZH RX MED GY IP 250 OP 250 PS 637: Mod: GY | Performed by: INTERNAL MEDICINE

## 2020-02-15 PROCEDURE — 85610 PROTHROMBIN TIME: CPT | Performed by: INTERNAL MEDICINE

## 2020-02-15 PROCEDURE — 82565 ASSAY OF CREATININE: CPT | Performed by: INTERNAL MEDICINE

## 2020-02-15 PROCEDURE — 99239 HOSP IP/OBS DSCHRG MGMT >30: CPT | Performed by: INTERNAL MEDICINE

## 2020-02-15 PROCEDURE — 85018 HEMOGLOBIN: CPT | Performed by: INTERNAL MEDICINE

## 2020-02-15 PROCEDURE — 25000128 H RX IP 250 OP 636: Performed by: INTERNAL MEDICINE

## 2020-02-15 PROCEDURE — 25000132 ZZH RX MED GY IP 250 OP 250 PS 637: Mod: GY | Performed by: ORTHOPAEDIC SURGERY

## 2020-02-15 RX ORDER — WARFARIN SODIUM 7.5 MG/1
7.5 TABLET ORAL
Status: DISCONTINUED | OUTPATIENT
Start: 2020-02-15 | End: 2020-02-15 | Stop reason: HOSPADM

## 2020-02-15 RX ORDER — METRONIDAZOLE 500 MG/1
500 TABLET ORAL 2 TIMES DAILY
Status: DISCONTINUED | OUTPATIENT
Start: 2020-02-15 | End: 2020-02-15 | Stop reason: HOSPADM

## 2020-02-15 RX ORDER — METRONIDAZOLE 500 MG/1
500 TABLET ORAL 2 TIMES DAILY
Qty: 84 TABLET | Refills: 0 | Status: SHIPPED | OUTPATIENT
Start: 2020-02-15 | End: 2020-06-19

## 2020-02-15 RX ADMIN — ACETAMINOPHEN 975 MG: 325 TABLET, FILM COATED ORAL at 05:56

## 2020-02-15 RX ADMIN — FUROSEMIDE 20 MG: 20 TABLET ORAL at 08:35

## 2020-02-15 RX ADMIN — GABAPENTIN 100 MG: 100 CAPSULE ORAL at 08:35

## 2020-02-15 RX ADMIN — ENOXAPARIN SODIUM 90 MG: 100 INJECTION SUBCUTANEOUS at 06:00

## 2020-02-15 RX ADMIN — METRONIDAZOLE 500 MG: 500 TABLET ORAL at 09:43

## 2020-02-15 RX ADMIN — CEFTRIAXONE 2 G: 2 INJECTION, POWDER, FOR SOLUTION INTRAMUSCULAR; INTRAVENOUS at 09:42

## 2020-02-15 RX ADMIN — MESALAMINE 800 MG: 800 TABLET, DELAYED RELEASE ORAL at 08:35

## 2020-02-15 ASSESSMENT — ACTIVITIES OF DAILY LIVING (ADL)
ADLS_ACUITY_SCORE: 13

## 2020-02-15 NOTE — PLAN OF CARE
"Vital signs:  Temp: 97.7  F (36.5  C) Temp src: Oral BP: 116/57   Heart Rate: 79 Resp: 20 SpO2: 98 % O2 Device: None (Room air) Oxygen Delivery: 2 LPM Height: 166.4 cm (5' 5.5\") Weight: 89.6 kg (197 lb 9.6 oz)  Estimated body mass index is 32.38 kg/m  as calculated from the following:    Height as of this encounter: 1.664 m (5' 5.5\").    Weight as of this encounter: 89.6 kg (197 lb 9.6 oz).        0746 MD updated: ANDERS pt is showing new bacteria in right knee cultures of bacteroides vulgatus and staphylococcus lugdunensis. Follow-up: started flagyl    A&O X4.  C/O pain rated 3/10- PRN Decline pain meds. CMS intact. Right knee immobilizer in place. SBA with gait belt. WBAT. Continues on IV Rocephin staterted flagyl. Discharge to home today with wife with outpatient infusion for 6 weeks then switching to oral antibiotics. Care coordinator to meet with pt and coordinate schedule. Discharging on coumadin with Lovenox as bridge. Pt had two loose BM's last shift, most bowl meds. Will discharge with PICC to left arm.  Will continue to monitor.  "

## 2020-02-15 NOTE — DISCHARGE SUMMARY
Discharge Summary  Hospitalist Service    Fausto Farr MRN# [id number]   YOB: 1940 Age: 79 year old     Date of Admission:  2/10/2020  Date of Discharge:  2/15/2020  Admitting Physician:  Prasanth Jensen MD  Discharge Physician: Chaparrita Chang MD  Discharging Service: Hospitalist Service     Primary Provider: Jodi Flower Mai  Primary Care Physician Phone Number: [phone]         Discharge Diagnoses/Problem Oriented Hospital Course (Providers):    Fausto Farr was admitted on 2/10/2020 by Prasanth Jensen MD and I would refer you to their history and physical.  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  He then underwent gastrocnemius muscle flap to that nonhealing area in November 2019.  That has now opened up with evidence of purulent drainage and so admitted on 2/10/2020 for bridging anticoagulation, status post I&D and placement of antibiotic needs of that right knee on 2/12/2020.    BCx drawn on admission remain NGTD.  Tissue and Fluid culture from the knee are growing light growth Haemophilus parainfluenza (S still pending), light growth MSSA (R to clinda/EEC), staph lugdenensis (S pending), and  bacteroides vulgaris mod growth in anaerobic cx.     He was initially placed on IV Vanco, and given a preop dose of cefazolin and gentamicin.  ID was consulted and he was switched over to ceftriaxone for ease of use and need for  prolonged IV antibiotics.  Given newly seen growth of Bacteroides vulgaris, I spoke directly with ID and recommendations are to add metronidazole 500 mg twice daily for 6 weeks to his discharge regimen.    Warfarin was held in anticipation of his procedure and he was bridged with heparin drip.  Warfarin and heparin were resumed immediately after surgery.  And he has been subsequently switched over to enoxaparin/warfarin overlap until INR therapeutic between 2.5 and 3.5.           Problem List:   1. 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 Fluid/tissue cultures + for haemophilus parainfluenza, mssa, staph lugdenesis, and now with Bacteroides vulgaris in anaerobic culture.-appreciate ID input, plans at this stage are for ceftriaxone for 6 weeks -PICC placed 2/13/2020, possibly adding rifampin p.o. next week, then likely long term oral therapy as must presume joint is infected as well.    Given new finding of Bacteroides vulgaris and his anaerobic culture on the day of discharge I reached out again to infectious disease who would recommend metronidazole 500 mg twice daily for 6 weeks, she will reach out to Dr. Leija next week to determine appropriate ID follow-up.  I suspect he'll need his prophylactic knee joint eventually explanted and then re-implanted- but will defer to ortho.    2. Mechanical mitral and Aortic valve replacement on chronic warfarin:  Warfarin held he was bridged with heparin and he underwent his procedure today.  Tolerated heparin and warfarin resumed right after surgery.  And tolerating. Transitioned to enox 2/13/20 til INR therapeutic 2.5-3.5  3. Anticoagulation in the setting of recent surgery: At risk for bleeding.    Hemoglobins were followed but he never did require any type of a transfusion. Consent for blood products signed and put in front of chart.  Has not required any transfusions hemoglobin stable in the 10  range  4. GGAE: On home CPAP  5. HLP: Continued home ezetimibe and rosuvastatin.  6. BPH continue home tamsulosin  7. Ulcerative colitis: continued with mesalamine as he was taking prior to admission  DVT Prophylaxis: enox/warfarin overlap   Code Status: Full Code  Functional Status: Independent  Diet: Regular  Lord: Placed in the OR and discontinued on postop day #2         Important Results:      As noted below         Pending Results:        Unresulted Labs Ordered in the Past 30 Days of this Admission     Date and Time Order Name Status Description    2/12/2020 1035 Tissue Culture Aerobic Bacterial Preliminary     2/12/2020 1035 Anaerobic bacterial culture Preliminary     2/12/2020 1034 Tissue Culture Aerobic Bacterial Preliminary     2/12/2020 1034 Anaerobic bacterial culture Preliminary     2/12/2020 1034 Anaerobic bacterial culture Preliminary     2/10/2020 1446 Blood culture Preliminary     2/10/2020 1446 Blood culture Preliminary             Discharge Instructions and Follow-Up:      Follow-up Appointments     Follow-up and recommended labs and tests       Follow up with Dr. Jensen in 7-10 days.  F/U with INR clinic on Monday for recheck on INR  F/U with PCP in 7-10 days for recheck  Dr Leija will check with you in the next 1-2 weeks   Resume HH RN/PT               Discharge Disposition:      Discharged to home         Discharge Medications:        Current Discharge Medication List      START taking these medications    Details   cefTRIAXone (ROCEPHIN) 1 GM vial Inject 2 g (2,000 mg) into the vein daily ESR,CRP,CBC with differential, creatinine, SGOT weekly while on this medication to be faxed to Dr. Leija office.  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 (H)         CONTINUE these medications which have NOT CHANGED    Details   acetaminophen (TYLENOL) 500 MG tablet Take 500-1,000 mg by mouth every 6 hours as needed for pain      betamethasone valerate (VALISONE) 0.1 % external lotion Apply topically 2 times daily Apply topically to scalp twice daily PRN  Qty: 60 mL, Refills: 3    Associated Diagnoses: Seborrheic dermatitis      cholecalciferol 25 MCG (1000 UT) TABS Take 1,000 Units by mouth daily      ezetimibe (ZETIA) 10 MG tablet Take 1 tablet (10 mg) by mouth daily  Qty: 90 tablet, Refills: 3    Associated Diagnoses: Mixed hyperlipidemia      ferrous sulfate (FEROSUL) 325 (65 Fe) MG tablet Take 325 mg by mouth daily (with breakfast)      fluocinonide (LIDEX) 0.05 % external ointment Apply topically 2 times daily as needed      furosemide (LASIX) 40 MG tablet Take 0.5 tablets (20 mg) by mouth daily  Qty: 45 tablet, Refills: 3    Comments: Profile Rx: patient will contact pharmacy when needed  Associated Diagnoses: Chronic diastolic congestive heart failure (H)      glucosamine-chondroitin 500-400 MG CAPS per capsule Take 1 capsule by mouth 2 times daily      mesalamine (ASACOL HD) 800 MG EC tablet Take 1 tablet (800 mg) by mouth 2 times daily  Qty: 180 tablet, Refills: 11    Associated Diagnoses: Ulcerative proctitis without complication (H)      methocarbamol (ROBAXIN) 750 MG tablet Take 1 tablet (750 mg) by mouth every 6 hours as needed for muscle spasms  Qty: 30 tablet, Refills: 0    Associated Diagnoses: Open wound of right knee, initial encounter      oxyCODONE (ROXICODONE) 5 MG tablet Take 5 mg by mouth every 6 hours as needed      rosuvastatin (CRESTOR) 40 MG  "tablet Take 1 tablet (40 mg) by mouth daily  Qty: 90 tablet, Refills: 3    Associated Diagnoses: Hyperlipidemia, unspecified hyperlipidemia type      tamsulosin (FLOMAX) 0.4 MG capsule TAKE 1 CAPSULE BY MOUTH EVERY DAY  Qty: 90 capsule, Refills: 3    Comments: Profile Rx: patient will contact pharmacy when needed  Associated Diagnoses: Urinary retention      warfarin ANTICOAGULANT (COUMADIN) 5 MG tablet Take 5mg on Tuesday, Wednesday, and Friday.  Take 7.5mg all other days of the week, or as directed by anticoagulation clinic.         STOP taking these medications       cefadroxil (DURICEF) 500 MG capsule Comments:   Reason for Stopping:                 Allergies:         Allergies   Allergen Reactions     Bees Anaphylaxis           Consultations This Hospital Stay:      Consultation during this admission received from infectious disease and orthopedics         Condition and Physical on Discharge:      Discharge condition: Stable   Vitals: Blood pressure 110/57, pulse 86, temperature 98.7  F (37.1  C), temperature source Oral, resp. rate 16, height 1.664 m (5' 5.5\"), weight 89.6 kg (197 lb 9.6 oz), SpO2 98 %.     Constitutional:  Pleasant no acute distress looks stated age head is normocephalic atraumatic and sclera are clear   Lungs:  CTA B normal effort normal saturations no supplemental oxygen requirements   Cardiovascular:  RRR with mechanical S1 and S2, no lower extremity edema   Abdomen:  Soft nontender nondistended   Skin:  Warm and dry no cyanosis or clubbing   Other:  Affect appropriate alert and oriented moving all extremities, ambulating without difficulty         Discharge Time:      Greater than 30 minutes.        Image Results From This Hospital Stay (For Non-EPIC Providers):      Procedure Date: 02/12/2020      PREOPERATIVE DIAGNOSIS:  Wound breakdown, possible infection, right total knee arthroplasty.      POSTOPERATIVE DIAGNOSIS:  Wound breakdown, possible infection, right total knee arthroplasty. "      PROCEDURES:   1. Irrigation and debridement of wound breakdown, right total knee arthroplasty.   2. Irrigation and debridement of right total knee with placement of antibiotic-impregnated (vancomycin) absorbable antibiotic beads.      ANESTHESIA:  General.         Most Recent Lab Results In EPIC (For Non-EPIC Providers):    Most Recent 3 CBC's:  Recent Labs   Lab Test 02/15/20  0600 02/14/20  0530 02/13/20  1758  02/12/20  1519 02/10/20  1511 02/10/20  1429   WBC  --   --   --   --  12.5* 9.7 9.8   HGB 9.9* 10.2* 10.0*   < > 11.1* 11.1* 11.2*   MCV  --   --   --   --  90 89 89   PLT  --   --   --   --  324 339 347    < > = values in this interval not displayed.      Most Recent 3 BMP's:  Recent Labs   Lab Test 02/15/20  0600 02/14/20  0530 02/13/20  0639  02/10/20  1429   NA  --  140 137  --  136   POTASSIUM  --  3.9 3.8  --  3.8   CHLORIDE  --  106 104  --  102   CO2  --  30 30  --  30   BUN  --  10 15  --  20   CR 0.87 0.84 0.97   < > 1.02   ANIONGAP  --  4 3  --  4   AWILDA  --  8.9 8.4*  --  9.0   GLC  --  109* 102*  --  142*    < > = values in this interval not displayed.     Most Recent 3 INR's:  Recent Labs   Lab Test 02/15/20  0600 02/14/20  0530 02/13/20  0639   INR 1.58* 1.46* 1.27*       Most Recent 6 Bacteria Isolates From Any Culture (See EPIC Reports for Culture Details):  Recent Labs   Lab Test 02/12/20  1033 02/12/20  1032 02/12/20  1029 02/10/20  1516 02/10/20  1510 02/03/20  1250   CULT Light growth  Haemophilus parainfluenzae  Susceptibility testing done on previous specimen  *  On day 2, isolated in broth only:  Gram positive cocci  *  Culture negative monitoring continues Moderate growth  Bacteroides vulgatus  *  Culture in progress  Light growth  Haemophilus parainfluenzae  Susceptibility testing done on previous specimen  *  Critical Value/Significant Value, preliminary result only, called to and read back by  Liana Wolf RN from [facility]. 2/13/20 at 0958.TV.   Light  growth  Staphylococcus aureus  *  Light growth  Haemophilus parainfluenzae  Susceptibility testing in progress  *  Light growth  Staphylococcus lugdunensis  Susceptibility testing in progress  *  Culture negative monitoring continues No growth after 5 days No growth after 5 days Moderate growth  Peptoniphilus asaccharolyticus  Susceptibility testing not routinely done  *  Heavy growth  Staphylococcus aureus  *  Heavy growth  Streptococcus agalactiae sero group B  *  Heavy growth  Corynebacterium striatum  Identification obtained by MALDI-TOF mass spectrometry research use only database. Test   characteristics determined and verified by the [facility]   ([facility]) [place], [place].  Susceptibility testing not routinely done  *     Most Recent TSH, T4 and HgbA1c:   Recent Labs   Lab Test 11/13/19  0804  04/25/17  1244   TSH 0.54   < >  --    A1C  --   --  5.9    < > = values in this interval not displayed.

## 2020-02-15 NOTE — PROGRESS NOTES
Brief ID progress note:    Contacted by Dr. Chang - pt with polymicrobial right TKA PJI (S. Lug, MSSA, H paraflu) on ceftri, plan for discharge today - now also growing B. vulgatus on anaerobic cultures. Would favor to add oral metronidazole 500mg bid to regimen. Patient needs f/u with Dr. Leija on discharge to discuss adding rifampin.     Staci La MD on 2/15/2020 at 8:47 AM

## 2020-02-15 NOTE — PLAN OF CARE
Pt A&O x4, VSS, had 6/10 right knee pain, 5mg of oxy given with relief.  LS CTA, 98% on RA,  Regular diet, up independent in room, on contact for MRSA, knee stabilizer in place.  Ortho, ID, and CM following.  Should discharge tomorrow by 11am. Will be given 6 weeks of Rochepin

## 2020-02-16 ENCOUNTER — HOSPITAL ENCOUNTER (OUTPATIENT)
Facility: CLINIC | Age: 80
Setting detail: SPECIMEN
Discharge: HOME | End: 2020-02-16
Attending: INTERNAL MEDICINE | Admitting: INTERNAL MEDICINE
Payer: MEDICARE

## 2020-02-16 ENCOUNTER — INFUSION THERAPY VISIT (OUTPATIENT)
Dept: INFUSION THERAPY | Facility: CLINIC | Age: 80
End: 2020-02-16
Attending: INTERNAL MEDICINE
Payer: MEDICARE

## 2020-02-16 VITALS
TEMPERATURE: 97.8 F | SYSTOLIC BLOOD PRESSURE: 137 MMHG | HEART RATE: 85 BPM | DIASTOLIC BLOOD PRESSURE: 70 MMHG | RESPIRATION RATE: 18 BRPM

## 2020-02-16 DIAGNOSIS — M00.80 ARTHRITIS DUE TO OTHER BACTERIA, SEPTIC ARTHRITIS OF UNSPECIFIED LOCATION (H): Primary | ICD-10-CM

## 2020-02-16 LAB
AST SERPL W P-5'-P-CCNC: 27 U/L (ref 0–45)
BACTERIA SPEC CULT: ABNORMAL
BACTERIA SPEC CULT: ABNORMAL
BACTERIA SPEC CULT: NO GROWTH
BACTERIA SPEC CULT: NO GROWTH
BASOPHILS # BLD AUTO: 0 10E9/L (ref 0–0.2)
BASOPHILS NFR BLD AUTO: 0.3 %
CREAT SERPL-MCNC: 1 MG/DL (ref 0.66–1.25)
CRP SERPL-MCNC: 61 MG/L (ref 0–8)
DIFFERENTIAL METHOD BLD: ABNORMAL
EOSINOPHIL # BLD AUTO: 0.3 10E9/L (ref 0–0.7)
EOSINOPHIL NFR BLD AUTO: 2.4 %
ERYTHROCYTE [DISTWIDTH] IN BLOOD BY AUTOMATED COUNT: 13.6 % (ref 10–15)
ERYTHROCYTE [SEDIMENTATION RATE] IN BLOOD BY WESTERGREN METHOD: 83 MM/H (ref 0–20)
GFR SERPL CREATININE-BSD FRML MDRD: 71 ML/MIN/{1.73_M2}
HCT VFR BLD AUTO: 35.1 % (ref 40–53)
HGB BLD-MCNC: 11.3 G/DL (ref 13.3–17.7)
IMM GRANULOCYTES # BLD: 0 10E9/L (ref 0–0.4)
IMM GRANULOCYTES NFR BLD: 0.2 %
LYMPHOCYTES # BLD AUTO: 1.7 10E9/L (ref 0.8–5.3)
LYMPHOCYTES NFR BLD AUTO: 14.5 %
Lab: NORMAL
Lab: NORMAL
MCH RBC QN AUTO: 28.6 PG (ref 26.5–33)
MCHC RBC AUTO-ENTMCNC: 32.2 G/DL (ref 31.5–36.5)
MCV RBC AUTO: 89 FL (ref 78–100)
MONOCYTES # BLD AUTO: 1.5 10E9/L (ref 0–1.3)
MONOCYTES NFR BLD AUTO: 13.2 %
NEUTROPHILS # BLD AUTO: 8.1 10E9/L (ref 1.6–8.3)
NEUTROPHILS NFR BLD AUTO: 69.4 %
NRBC # BLD AUTO: 0 10*3/UL
NRBC BLD AUTO-RTO: 0 /100
PLATELET # BLD AUTO: 410 10E9/L (ref 150–450)
RBC # BLD AUTO: 3.95 10E12/L (ref 4.4–5.9)
SPECIMEN SOURCE: ABNORMAL
SPECIMEN SOURCE: NORMAL
SPECIMEN SOURCE: NORMAL
WBC # BLD AUTO: 11.7 10E9/L (ref 4–11)

## 2020-02-16 PROCEDURE — 85025 COMPLETE CBC W/AUTO DIFF WBC: CPT | Performed by: INTERNAL MEDICINE

## 2020-02-16 PROCEDURE — 86140 C-REACTIVE PROTEIN: CPT | Performed by: INTERNAL MEDICINE

## 2020-02-16 PROCEDURE — 25000125 ZZHC RX 250: Performed by: INTERNAL MEDICINE

## 2020-02-16 PROCEDURE — 25000128 H RX IP 250 OP 636: Performed by: INTERNAL MEDICINE

## 2020-02-16 PROCEDURE — 96374 THER/PROPH/DIAG INJ IV PUSH: CPT

## 2020-02-16 PROCEDURE — 82565 ASSAY OF CREATININE: CPT | Performed by: INTERNAL MEDICINE

## 2020-02-16 PROCEDURE — 84450 TRANSFERASE (AST) (SGOT): CPT | Performed by: INTERNAL MEDICINE

## 2020-02-16 PROCEDURE — 85652 RBC SED RATE AUTOMATED: CPT | Performed by: INTERNAL MEDICINE

## 2020-02-16 RX ORDER — HEPARIN SODIUM,PORCINE 10 UNIT/ML
5 VIAL (ML) INTRAVENOUS
Status: CANCELLED | OUTPATIENT
Start: 2020-02-17

## 2020-02-16 RX ORDER — CEFTRIAXONE SODIUM 2 G
2 VIAL (EA) INJECTION ONCE
Status: COMPLETED | OUTPATIENT
Start: 2020-02-16 | End: 2020-02-16

## 2020-02-16 RX ORDER — CEFTRIAXONE SODIUM 2 G
2 VIAL (EA) INJECTION ONCE
Status: CANCELLED
Start: 2020-02-17

## 2020-02-16 RX ORDER — HEPARIN SODIUM (PORCINE) LOCK FLUSH IV SOLN 100 UNIT/ML 100 UNIT/ML
5 SOLUTION INTRAVENOUS
Status: CANCELLED | OUTPATIENT
Start: 2020-02-17

## 2020-02-16 RX ADMIN — CEFTRIAXONE SODIUM 2 G: 2 INJECTION, POWDER, FOR SOLUTION INTRAMUSCULAR; INTRAVENOUS at 10:20

## 2020-02-16 NOTE — PROGRESS NOTES
Infusion Nursing Note:  Fausot Farr presents today for rocephin.    Patient seen by provider today: No   present during visit today: Not Applicable.    Note: N/A.    Intravenous Access:  Labs drawn without difficulty.  PICC.    Treatment Conditions:  Not Applicable.      Post Infusion Assessment:  Patient tolerated infusion without incident.  Site patent and intact, free from redness, edema or discomfort.  No evidence of extravasations.       Discharge Plan:   Discharge instructions reviewed with: Family.  Patient and/or family verbalized understanding of discharge instructions and all questions answered.  AVS to patient via [employer].  Patient will return 2/17/20 at [facility] for next appointment.   Patient discharged in stable condition accompanied by: wife.  Departure Mode: Ambulatory.    Paige Britton RN

## 2020-02-17 ENCOUNTER — PATIENT OUTREACH (OUTPATIENT)
Dept: CARE COORDINATION | Facility: CLINIC | Age: 80
End: 2020-02-17

## 2020-02-17 ENCOUNTER — ALLIED HEALTH/NURSE VISIT (OUTPATIENT)
Dept: INFUSION THERAPY | Facility: CLINIC | Age: 80
End: 2020-02-17
Attending: INTERNAL MEDICINE
Payer: MEDICARE

## 2020-02-17 ENCOUNTER — TELEPHONE (OUTPATIENT)
Dept: PEDIATRICS | Facility: CLINIC | Age: 80
End: 2020-02-17

## 2020-02-17 VITALS
DIASTOLIC BLOOD PRESSURE: 75 MMHG | HEART RATE: 83 BPM | RESPIRATION RATE: 18 BRPM | TEMPERATURE: 96.6 F | SYSTOLIC BLOOD PRESSURE: 141 MMHG | OXYGEN SATURATION: 97 %

## 2020-02-17 DIAGNOSIS — I48.91 ATRIAL FIBRILLATION, UNSPECIFIED TYPE (H): ICD-10-CM

## 2020-02-17 DIAGNOSIS — M00.80 ARTHRITIS DUE TO OTHER BACTERIA, SEPTIC ARTHRITIS OF UNSPECIFIED LOCATION (H): Primary | ICD-10-CM

## 2020-02-17 DIAGNOSIS — I48.91 ATRIAL FIBRILLATION, UNSPECIFIED TYPE (H): Primary | ICD-10-CM

## 2020-02-17 DIAGNOSIS — Z71.89 OTHER SPECIFIED COUNSELING: ICD-10-CM

## 2020-02-17 LAB — INR PPP: 2.11 (ref 0.86–1.14)

## 2020-02-17 PROCEDURE — 25000125 ZZHC RX 250: Performed by: INTERNAL MEDICINE

## 2020-02-17 PROCEDURE — 96374 THER/PROPH/DIAG INJ IV PUSH: CPT

## 2020-02-17 PROCEDURE — 25000128 H RX IP 250 OP 636: Performed by: INTERNAL MEDICINE

## 2020-02-17 PROCEDURE — 85610 PROTHROMBIN TIME: CPT | Performed by: INTERNAL MEDICINE

## 2020-02-17 PROCEDURE — 36415 COLL VENOUS BLD VENIPUNCTURE: CPT | Performed by: INTERNAL MEDICINE

## 2020-02-17 RX ORDER — CEFTRIAXONE SODIUM 2 G
2 VIAL (EA) INJECTION ONCE
Status: CANCELLED
Start: 2020-02-18

## 2020-02-17 RX ORDER — HEPARIN SODIUM (PORCINE) LOCK FLUSH IV SOLN 100 UNIT/ML 100 UNIT/ML
5 SOLUTION INTRAVENOUS
Status: CANCELLED | OUTPATIENT
Start: 2020-02-18

## 2020-02-17 RX ORDER — CEFTRIAXONE SODIUM 2 G
2 VIAL (EA) INJECTION ONCE
Status: COMPLETED | OUTPATIENT
Start: 2020-02-17 | End: 2020-02-17

## 2020-02-17 RX ORDER — HEPARIN SODIUM,PORCINE 10 UNIT/ML
5 VIAL (ML) INTRAVENOUS
Status: CANCELLED | OUTPATIENT
Start: 2020-02-18

## 2020-02-17 RX ADMIN — CEFTRIAXONE SODIUM 2 G: 2 INJECTION, POWDER, FOR SOLUTION INTRAMUSCULAR; INTRAVENOUS at 10:16

## 2020-02-17 NOTE — PROGRESS NOTES
02/18/20-The clinic Community Health Worker spoke with the patient today at the request of the PCP to discuss possible Clinic Care Coordination enrollment.  The service was described to the patient and immediate needs were discussed.  The patient declined enrollment at this time.  The PCP is encouraged to refer in the future if the patient's needs change.CHW will mail out information on the Clinic Care Coordination program.        02/17/20- CCC Referral: Attempt 1  Community Health Worker called and left a message for the patient in order to discuss enrollment with Clinic Care Coordination.    If the patient is returning my call, please transfer patient to me, Gisella, at [phone].

## 2020-02-17 NOTE — PROGRESS NOTES
Infusion Nursing Note:  Fausto Farr presents today for Rocephin.     present during visit today: Not Applicable.    Note: N/A.    Intravenous Access:  PICC.    Treatment Conditions:  NA    Post Lab Assessment:    Patient tolerated injection   Blood return noted pre and post infusion.  Site patent and intact, free from redness, edema or discomfort.  No evidence of extravasations.  Access (remains for infusion use       Discharge Plan:   Patient and/or family verbalized understanding of  instructions and all questions answered.  Patient  discharged in stable condition accompanied by: self and friend.  Patient to see provider today: No  Departure Mode: Ambulatory with walker.  Lee Ann Enriquez, RN, RN

## 2020-02-17 NOTE — LETTER
[facility] CARE COORDINATION      February 18, 2020    Fausto Farr  [address]  [place] [address]        Dear Fausto,    I am a clinic care coordinator who works with Chris Flower MD at [facility]. I wanted to thank you for spending the time to talk with me.  Below is a description of clinic care coordination and how I can further assist you.      The clinic care coordinator team is made up of a registered nurse,  and community health worker who understand the health care system. The goal of clinic care coordination is to help you manage your health and improve access to the health care system in the most efficient manner. The team can assist you in meeting your health care goals by providing education, coordinating services, strengthening the communication among your providers  and supporting you with any resource needs.    Please feel free to contact me, at [phone] with any questions or concerns. We are focused on providing you with the highest-quality healthcare experience possible and that all starts with you.     Sincerely,     GILLIAN Arambula                                                                     Clinic Care Coordination                                          [facility]: [place]  Phone: [phone]

## 2020-02-17 NOTE — PROGRESS NOTES
Patient identified as high risk for readmission.  Patient meets criteria for care coordination outreach.    Nemo Crow RN  Care Coordination  Phone:  [phone]  Email: [email or web address] [facility]

## 2020-02-17 NOTE — TELEPHONE ENCOUNTER
I received a call from [facility] lab staff regarding INR order for patient. Lab staff stated that patient is at lab now stating that he called clinic earlier today because he needs to have his INR checked.    ORA WATSON MA on 2/17/2020 at 12:07 PM

## 2020-02-17 NOTE — TELEPHONE ENCOUNTER
INR Lab ordered per Dr. Chang note from from 2/15/20.     Per note from Dr. Chang on 2/15/20 at 1021:  Follow up with Dr. Jensen in 7-10 days.  F/U with INR clinic on Monday for recheck on INR  F/U with PCP in 7-10 days for recheck  Dr Leija will check with you in the next 1-2 weeks   Resume  RN/PT.    Juan Carlos Parmar RN on 2/17/2020 at 12:28 PM

## 2020-02-18 ENCOUNTER — ALLIED HEALTH/NURSE VISIT (OUTPATIENT)
Dept: INFUSION THERAPY | Facility: CLINIC | Age: 80
End: 2020-02-18
Attending: INTERNAL MEDICINE
Payer: MEDICARE

## 2020-02-18 ENCOUNTER — TRANSFERRED RECORDS (OUTPATIENT)
Dept: HEALTH INFORMATION MANAGEMENT | Facility: CLINIC | Age: 80
End: 2020-02-18

## 2020-02-18 ENCOUNTER — TELEPHONE (OUTPATIENT)
Dept: PEDIATRICS | Facility: CLINIC | Age: 80
End: 2020-02-18

## 2020-02-18 VITALS
OXYGEN SATURATION: 97 % | HEART RATE: 64 BPM | DIASTOLIC BLOOD PRESSURE: 82 MMHG | TEMPERATURE: 96.9 F | SYSTOLIC BLOOD PRESSURE: 145 MMHG | RESPIRATION RATE: 16 BRPM

## 2020-02-18 DIAGNOSIS — I48.91 AF (ATRIAL FIBRILLATION) (H): ICD-10-CM

## 2020-02-18 DIAGNOSIS — Z79.01 LONG TERM CURRENT USE OF ANTICOAGULANT THERAPY: ICD-10-CM

## 2020-02-18 DIAGNOSIS — M00.80 ARTHRITIS DUE TO OTHER BACTERIA, SEPTIC ARTHRITIS OF UNSPECIFIED LOCATION (H): Primary | ICD-10-CM

## 2020-02-18 DIAGNOSIS — Z95.2 S/P MITRAL VALVE REPLACEMENT: ICD-10-CM

## 2020-02-18 PROCEDURE — 96374 THER/PROPH/DIAG INJ IV PUSH: CPT

## 2020-02-18 PROCEDURE — 25000128 H RX IP 250 OP 636: Performed by: INTERNAL MEDICINE

## 2020-02-18 PROCEDURE — 25000125 ZZHC RX 250: Performed by: INTERNAL MEDICINE

## 2020-02-18 RX ORDER — CEFTRIAXONE SODIUM 2 G
2 VIAL (EA) INJECTION ONCE
Status: CANCELLED
Start: 2020-02-19

## 2020-02-18 RX ORDER — HEPARIN SODIUM (PORCINE) LOCK FLUSH IV SOLN 100 UNIT/ML 100 UNIT/ML
5 SOLUTION INTRAVENOUS
Status: CANCELLED | OUTPATIENT
Start: 2020-02-19

## 2020-02-18 RX ORDER — HEPARIN SODIUM,PORCINE 10 UNIT/ML
5 VIAL (ML) INTRAVENOUS
Status: CANCELLED | OUTPATIENT
Start: 2020-02-19

## 2020-02-18 RX ORDER — CEFTRIAXONE SODIUM 2 G
2 VIAL (EA) INJECTION ONCE
Status: COMPLETED | OUTPATIENT
Start: 2020-02-18 | End: 2020-02-18

## 2020-02-18 RX ADMIN — CEFTRIAXONE SODIUM 2 G: 2 INJECTION, POWDER, FOR SOLUTION INTRAMUSCULAR; INTRAVENOUS at 10:09

## 2020-02-18 NOTE — PROGRESS NOTES
Infusion Nursing Note:  Fausto Farr presents today for Rocephin.    Patient seen by provider today: No   present during visit today: Not Applicable.    Note: Patient reports he's struggles with diarrhea since being in the hospital. Currently having 3-4 watery stools a day, sometimes more.  He had been on stool softeners and Miralax while in the hosptial, but is no longer taking them. He started eating yogurt yesterday. Writer instructed patient to contact his provider regarding diarrhea in case further evaluation needed.Patient verbalized understanding.     Intravenous Access:  PICC.  Dressing change due 2/21.    Treatment Conditions:  Not Applicable.      Post Infusion Assessment:  Patient tolerated infusion without incident.  Blood return noted pre and post infusion.  Site patent and intact, free from redness, edema or discomfort.  No evidence of extravasations.  PICC flushed and left intact.       Discharge Plan:    Patient will return 2/19 for next appointment.  Patient discharged in stable condition accompanied by: self.  Departure Mode: Ambulatory with walker.    Maura Frances RN

## 2020-02-18 NOTE — TELEPHONE ENCOUNTER
ANTICOAGULATION MANAGEMENT     Patient Name:  Fausto Farr  Date:  2020    ASSESSMENT /SUBJECTIVE:      Today's INR result of 2.11 is subtherapeutic. Goal INR of 2.5-3.5      Warfarin dose taken: Warfarin taken as previously instructed    Diet: No new diet changes affecting INR    Medication changes/ interactions: rocephin injections daily    Previous INR: Subtherapeutic     S/S of bleeding or thromboembolism: No    New injury or illness:  No    Upcoming surgery, procedure or cardioversion:  No    Additional findings: recent hospitalization for TKA infection      PLAN:    Spoke with lisa aviles regarding INR result and instructed:     Warfarin Dosing Instructions: Continue your current warfarin dose    Instructed patient to follow up no later than: 1 day  Lab visit scheduled    Education provided: nurse Bonny verbalizes understanding and agrees to warfarin dosing plan.    Instructed to call the Anticoagulation Clinic for any changes, questions or concerns. (#[phone])        OBJECTIVE:  INR   Date Value Ref Range Status   2020 2.11 (H) 0.86 - 1.14 Final             Anticoagulation Summary  As of 2020    INR goal:   2.5-3.5   TTR:   73.9 % (10.8 mo)   INR used for dosin.11! (2020)   Warfarin maintenance plan:   5 mg (5 mg x 1) every Mon, Wed, Fri; 7.5 mg (5 mg x 1.5) all other days   Full warfarin instructions:   5 mg every Mon, Wed, Fri; 7.5 mg all other days   Weekly warfarin total:   45 mg   No change documented:   Aline Beal RN   Plan last modified:   Diane Delcid RN (2019)   Next INR check:   2020   Priority:   High   Target end date:   Indefinite    Indications    AF (atrial fibrillation) (H) [I48.91]  S/P mitral valve replacement [Z95.2]  Long term current use of anticoagulant therapy [Z79.01]             Anticoagulation Episode Summary     INR check location:       Preferred lab:   EXTERNAL LAB    Send INR reminders to:   SHANNA  ORLANDO    Comments:   5mg tabs - andrade dose // transfer from [facility] // [facility] // CALENDAR // right knee replaced 7/22/19 //Home care      Anticoagulation Care Providers     Provider Role Specialty Phone number    Jodi Flower Mai, MD  Internal Medicine [phone]

## 2020-02-19 ENCOUNTER — HOSPITAL ENCOUNTER (OUTPATIENT)
Facility: CLINIC | Age: 80
Setting detail: SPECIMEN
Discharge: HOME | End: 2020-02-19
Attending: INTERNAL MEDICINE | Admitting: INTERNAL MEDICINE
Payer: MEDICARE

## 2020-02-19 ENCOUNTER — ALLIED HEALTH/NURSE VISIT (OUTPATIENT)
Dept: INFUSION THERAPY | Facility: CLINIC | Age: 80
End: 2020-02-19
Attending: INTERNAL MEDICINE
Payer: MEDICARE

## 2020-02-19 ENCOUNTER — TELEPHONE (OUTPATIENT)
Dept: PEDIATRICS | Facility: CLINIC | Age: 80
End: 2020-02-19

## 2020-02-19 VITALS — HEART RATE: 74 BPM | RESPIRATION RATE: 16 BRPM | OXYGEN SATURATION: 99 % | TEMPERATURE: 97 F

## 2020-02-19 DIAGNOSIS — I48.91 AF (ATRIAL FIBRILLATION) (H): ICD-10-CM

## 2020-02-19 DIAGNOSIS — M00.80 ARTHRITIS DUE TO OTHER BACTERIA, SEPTIC ARTHRITIS OF UNSPECIFIED LOCATION (H): Primary | ICD-10-CM

## 2020-02-19 DIAGNOSIS — Z95.2 S/P MITRAL VALVE REPLACEMENT: ICD-10-CM

## 2020-02-19 DIAGNOSIS — Z79.01 LONG TERM CURRENT USE OF ANTICOAGULANT THERAPY: ICD-10-CM

## 2020-02-19 LAB
BACTERIA SPEC CULT: ABNORMAL
INR PPP: 2.88 (ref 0.86–1.14)
Lab: ABNORMAL
Lab: ABNORMAL
SPECIMEN SOURCE: ABNORMAL
SPECIMEN SOURCE: ABNORMAL

## 2020-02-19 PROCEDURE — 25000128 H RX IP 250 OP 636: Performed by: INTERNAL MEDICINE

## 2020-02-19 PROCEDURE — 96374 THER/PROPH/DIAG INJ IV PUSH: CPT

## 2020-02-19 PROCEDURE — 85610 PROTHROMBIN TIME: CPT | Performed by: INTERNAL MEDICINE

## 2020-02-19 PROCEDURE — 25000125 ZZHC RX 250: Performed by: INTERNAL MEDICINE

## 2020-02-19 RX ORDER — CEFTRIAXONE SODIUM 2 G
2 VIAL (EA) INJECTION ONCE
Status: CANCELLED
Start: 2020-02-20

## 2020-02-19 RX ORDER — HEPARIN SODIUM (PORCINE) LOCK FLUSH IV SOLN 100 UNIT/ML 100 UNIT/ML
5 SOLUTION INTRAVENOUS
Status: DISCONTINUED | OUTPATIENT
Start: 2020-02-19 | End: 2020-02-19 | Stop reason: HOSPADM

## 2020-02-19 RX ORDER — CEFTRIAXONE SODIUM 2 G
2 VIAL (EA) INJECTION ONCE
Status: COMPLETED | OUTPATIENT
Start: 2020-02-19 | End: 2020-02-19

## 2020-02-19 RX ORDER — HEPARIN SODIUM,PORCINE 10 UNIT/ML
5 VIAL (ML) INTRAVENOUS
Status: CANCELLED | OUTPATIENT
Start: 2020-02-20

## 2020-02-19 RX ORDER — HEPARIN SODIUM (PORCINE) LOCK FLUSH IV SOLN 100 UNIT/ML 100 UNIT/ML
5 SOLUTION INTRAVENOUS
Status: CANCELLED | OUTPATIENT
Start: 2020-02-20

## 2020-02-19 RX ORDER — HEPARIN SODIUM,PORCINE 10 UNIT/ML
5 VIAL (ML) INTRAVENOUS
Status: DISCONTINUED | OUTPATIENT
Start: 2020-02-19 | End: 2020-02-19 | Stop reason: HOSPADM

## 2020-02-19 RX ADMIN — CEFTRIAXONE SODIUM 2 G: 2 INJECTION, POWDER, FOR SOLUTION INTRAMUSCULAR; INTRAVENOUS at 10:48

## 2020-02-19 NOTE — TELEPHONE ENCOUNTER
ANTICOAGULATION MANAGEMENT     Patient Name:  Fausto Farr  Date:  2020    ASSESSMENT /SUBJECTIVE:      Today's INR result of 2.88 is therapeutic. Goal INR of 2.5-3.5      Warfarin dose taken: Warfarin taken as previously instructed    Diet: No new diet changes affecting INR    Medication changes/ interactions: Interaction between rocephin and warfarin may be affecting INR    Previous INR: Subtherapeutic     S/S of bleeding or thromboembolism: No    New injury or illness:  Yes: Patient had wound debridgement procedure and is on PICC line antibiotics rocephin for the next 6 weeks, reports diarrhea that started yesterday 20, started yogurt twice daily and reports this is getting better    Upcoming surgery, procedure or cardioversion:  No    Additional findings: None      PLAN:    Spoke with Fausto regarding INR result and instructed:     Warfarin Dosing Instructions: Continue your current warfarin dose    Instructed patient to follow up no later than: 20  Patient will have lab drawn when goes in for antibiotic infusion on friday    Education provided: Yes: affects of antibiotics on INR      Ralph verbalizes understanding and agrees to warfarin dosing plan.    Instructed to call the Anticoagulation Clinic for any changes, questions or concerns. (#[phone])        OBJECTIVE:  INR   Date Value Ref Range Status   2020 2.88 (H) 0.86 - 1.14 Final             Anticoagulation Summary  As of 2020    INR goal:   2.5-3.5   TTR:   73.7 % (10.9 mo)   INR used for dosin.88 (2020)   Warfarin maintenance plan:   5 mg (5 mg x 1) every Mon, Wed, Fri; 7.5 mg (5 mg x 1.5) all other days   Full warfarin instructions:   5 mg every Mon, Wed, Fri; 7.5 mg all other days   Weekly warfarin total:   45 mg   Plan last modified:   Diane Delcid RN (2019)   Next INR check:   2020   Priority:   High   Target end date:   Indefinite    Indications    AF (atrial fibrillation) (H)  [I48.91]  S/P mitral valve replacement [Z95.2]  Long term current use of anticoagulant therapy [Z79.01]             Anticoagulation Episode Summary     INR check location:       Preferred lab:   EXTERNAL LAB    Send INR reminders to:   SHANNA PERRY    Comments:   5mg tabs - andrade dose // transfer from [facility] // [facility] // CALENDAR // right knee replaced 7/22/19 //Home care      Anticoagulation Care Providers     Provider Role Specialty Phone number    Jodi Flower Mai, MD  Internal Medicine [phone]

## 2020-02-19 NOTE — PROGRESS NOTES
Infusion Nursing Note:  Fausto Farr presents today for Rocephin.    Patient seen by provider today: No   present during visit today: Not Applicable.    Note: N/A.    Intravenous Access:  PICC.    Treatment Conditions:  Not Applicable.      Post Infusion Assessment:  Patient tolerated infusion without incident.  Blood return noted pre and post infusion.  Site patent and intact, free from redness, edema or discomfort.  No evidence of extravasations.       Discharge Plan:   Discharge instructions reviewed with: Patient.  Patient and/or family verbalized understanding of discharge instructions and all questions answered.  AVS to patient via HipChatT.  Patient will return 2/20/20 for next appointment.   Patient discharged in stable condition accompanied by: self.  Departure Mode: Ambulatory.    Dianelys Hanley RN

## 2020-02-20 ENCOUNTER — ALLIED HEALTH/NURSE VISIT (OUTPATIENT)
Dept: INFUSION THERAPY | Facility: CLINIC | Age: 80
End: 2020-02-20
Attending: INTERNAL MEDICINE
Payer: MEDICARE

## 2020-02-20 VITALS
TEMPERATURE: 97.1 F | SYSTOLIC BLOOD PRESSURE: 137 MMHG | DIASTOLIC BLOOD PRESSURE: 58 MMHG | OXYGEN SATURATION: 99 % | RESPIRATION RATE: 16 BRPM | HEART RATE: 54 BPM

## 2020-02-20 DIAGNOSIS — M00.80 ARTHRITIS DUE TO OTHER BACTERIA, SEPTIC ARTHRITIS OF UNSPECIFIED LOCATION (H): Primary | ICD-10-CM

## 2020-02-20 PROCEDURE — 25000125 ZZHC RX 250: Performed by: INTERNAL MEDICINE

## 2020-02-20 PROCEDURE — 96374 THER/PROPH/DIAG INJ IV PUSH: CPT

## 2020-02-20 PROCEDURE — 25000128 H RX IP 250 OP 636: Performed by: INTERNAL MEDICINE

## 2020-02-20 RX ORDER — CEFTRIAXONE SODIUM 2 G
2 VIAL (EA) INJECTION ONCE
Status: COMPLETED | OUTPATIENT
Start: 2020-02-20 | End: 2020-02-20

## 2020-02-20 RX ORDER — HEPARIN SODIUM (PORCINE) LOCK FLUSH IV SOLN 100 UNIT/ML 100 UNIT/ML
5 SOLUTION INTRAVENOUS
Status: CANCELLED | OUTPATIENT
Start: 2020-02-21

## 2020-02-20 RX ORDER — HEPARIN SODIUM,PORCINE 10 UNIT/ML
5 VIAL (ML) INTRAVENOUS
Status: CANCELLED | OUTPATIENT
Start: 2020-02-21

## 2020-02-20 RX ORDER — CEFTRIAXONE SODIUM 2 G
2 VIAL (EA) INJECTION ONCE
Status: CANCELLED
Start: 2020-02-21

## 2020-02-20 RX ADMIN — CEFTRIAXONE SODIUM 2 G: 2 INJECTION, POWDER, FOR SOLUTION INTRAMUSCULAR; INTRAVENOUS at 10:11

## 2020-02-20 NOTE — PROGRESS NOTES
Infusion Nursing Note:  Fausto Farr presents today for Rocephin.    Patient seen by provider today: No   present during visit today: Not Applicable.    Note: N/A.    Intravenous Access:  PICC.    Treatment Conditions:  Not Applicable.      Post Infusion Assessment:  Patient tolerated infusion without incident.  Blood return noted pre and post infusion.  Site patent and intact, free from redness, edema or discomfort.  No evidence of extravasations.       Discharge Plan:   Discharge instructions reviewed with: Patient.  Patient and/or family verbalized understanding of discharge instructions and all questions answered.  AVS to patient via Nimb[employer] Discovery[employer].  Patient will return 2/21/20 for next appointment.   Patient discharged in stable condition accompanied by: self.  Departure Mode: Ambulatory.    Dianelys Hanley RN

## 2020-02-21 ENCOUNTER — HOSPITAL ENCOUNTER (OUTPATIENT)
Facility: CLINIC | Age: 80
Setting detail: SPECIMEN
End: 2020-02-21
Attending: INTERNAL MEDICINE
Payer: MEDICARE

## 2020-02-21 ENCOUNTER — ALLIED HEALTH/NURSE VISIT (OUTPATIENT)
Dept: INFUSION THERAPY | Facility: CLINIC | Age: 80
End: 2020-02-21
Attending: INTERNAL MEDICINE
Payer: MEDICARE

## 2020-02-21 ENCOUNTER — TELEPHONE (OUTPATIENT)
Dept: PEDIATRICS | Facility: CLINIC | Age: 80
End: 2020-02-21

## 2020-02-21 VITALS
TEMPERATURE: 97.8 F | RESPIRATION RATE: 16 BRPM | DIASTOLIC BLOOD PRESSURE: 70 MMHG | OXYGEN SATURATION: 98 % | HEART RATE: 87 BPM | SYSTOLIC BLOOD PRESSURE: 111 MMHG

## 2020-02-21 DIAGNOSIS — I48.91 AF (ATRIAL FIBRILLATION) (H): ICD-10-CM

## 2020-02-21 DIAGNOSIS — Z95.2 S/P MITRAL VALVE REPLACEMENT: ICD-10-CM

## 2020-02-21 DIAGNOSIS — M00.80 ARTHRITIS DUE TO OTHER BACTERIA, SEPTIC ARTHRITIS OF UNSPECIFIED LOCATION (H): Primary | ICD-10-CM

## 2020-02-21 DIAGNOSIS — Z79.01 LONG TERM CURRENT USE OF ANTICOAGULANT THERAPY: ICD-10-CM

## 2020-02-21 LAB — INR PPP: 2.68 (ref 0.86–1.14)

## 2020-02-21 PROCEDURE — 25000125 ZZHC RX 250: Performed by: INTERNAL MEDICINE

## 2020-02-21 PROCEDURE — 96374 THER/PROPH/DIAG INJ IV PUSH: CPT

## 2020-02-21 PROCEDURE — 25000128 H RX IP 250 OP 636: Performed by: INTERNAL MEDICINE

## 2020-02-21 PROCEDURE — 85610 PROTHROMBIN TIME: CPT | Performed by: INTERNAL MEDICINE

## 2020-02-21 RX ORDER — HEPARIN SODIUM (PORCINE) LOCK FLUSH IV SOLN 100 UNIT/ML 100 UNIT/ML
5 SOLUTION INTRAVENOUS
Status: CANCELLED | OUTPATIENT
Start: 2020-02-22

## 2020-02-21 RX ORDER — HEPARIN SODIUM,PORCINE 10 UNIT/ML
5 VIAL (ML) INTRAVENOUS
Status: CANCELLED | OUTPATIENT
Start: 2020-02-22

## 2020-02-21 RX ORDER — CEFTRIAXONE SODIUM 2 G
2 VIAL (EA) INJECTION ONCE
Status: COMPLETED | OUTPATIENT
Start: 2020-02-21 | End: 2020-02-21

## 2020-02-21 RX ORDER — CEFTRIAXONE SODIUM 2 G
2 VIAL (EA) INJECTION ONCE
Status: CANCELLED
Start: 2020-02-22

## 2020-02-21 RX ADMIN — CEFTRIAXONE SODIUM 2 G: 2 INJECTION, POWDER, FOR SOLUTION INTRAMUSCULAR; INTRAVENOUS at 10:24

## 2020-02-21 ASSESSMENT — PAIN SCALES - GENERAL: PAINLEVEL: MILD PAIN (2)

## 2020-02-21 NOTE — TELEPHONE ENCOUNTER
ANTICOAGULATION MANAGEMENT     Patient Name:  Fausto Farr  Date:  2020    ASSESSMENT /SUBJECTIVE:      Today's INR result of 2.68 is therapeutic. Goal INR of 2.5-3.5      Warfarin dose taken: Warfarin taken as previously instructed    Diet: No new diet changes affecting INR    Medication changes/ interactions: No new medications/supplements affecting INR    Previous INR: Therapeutic     S/S of bleeding or thromboembolism: No    New injury or illness:  Yes: Patient had wound debridement and is on PICC line antibiotics for the next 6 weeks, denies any more diarrhea, no swelling/bleeding from wound at this time    Upcoming surgery, procedure or cardioversion:  No    Additional findings: none      PLAN:    Spoke with Fausto regarding INR result and instructed:     Warfarin Dosing Instructions: Continue your current warfarin dose    Instructed patient to follow up no later than: 20 at infusion viist  Patient using outside facility for labs    Education provided: No      Ralph verbalizes understanding and agrees to warfarin dosing plan.    Instructed to call the Anticoagulation Clinic for any changes, questions or concerns. (#[phone])        OBJECTIVE:  INR   Date Value Ref Range Status   2020 2.68 (H) 0.86 - 1.14 Final             Anticoagulation Summary  As of 2020    INR goal:   2.5-3.5   TTR:   73.7 % (10.9 mo)   INR used for dosin.68 (2020)   Warfarin maintenance plan:   5 mg (5 mg x 1) every Mon, Wed, Fri; 7.5 mg (5 mg x 1.5) all other days   Full warfarin instructions:   5 mg every Mon, Wed, Fri; 7.5 mg all other days   Weekly warfarin total:   45 mg   Plan last modified:   Diane Delcid RN (2019)   Next INR check:   2020   Priority:   High   Target end date:   Indefinite    Indications    AF (atrial fibrillation) (H) [I48.91]  S/P mitral valve replacement [Z95.2]  Long term current use of anticoagulant therapy [Z79.01]             Anticoagulation  Episode Summary     INR check location:       Preferred lab:   EXTERNAL LAB    Send INR reminders to:   SHANNA PERRY    Comments:   5mg tabs - andrade dose // transfer from [facility] // [facility] // CALENDAR // right knee replaced 7/22/19 //Home care      Anticoagulation Care Providers     Provider Role Specialty Phone number    Jodi Flower Mai, MD  Internal Medicine [phone]

## 2020-02-21 NOTE — PROGRESS NOTES
Infusion Nursing Note:  Fausto Farr presents today for Rocephine, INR lab draw and PICC dressing change.    Patient seen by provider today: No   present during visit today: Not Applicable.    Note: patient still have 3-4 stools per day, but consistency is becoming more formed. Has been eating bananas and yogurt.    Intravenous Access:  Labs drawn without difficulty.  PICC single lumen. Dressing and cap changed.     Treatment Conditions:  INR sent. Labs due Sunday      Post Infusion Assessment:  Patient tolerated infusion without incident.  Blood return noted pre and post infusion.  No evidence of extravasations.       Discharge Plan:   Copy of AVS reviewed with patient and/or family.  Patient will return 2/22 at [facility]  for next appointment.  Patient discharged in stable condition accompanied by: self..    Maura Frances RN

## 2020-02-22 ENCOUNTER — INFUSION THERAPY VISIT (OUTPATIENT)
Dept: INFUSION THERAPY | Facility: CLINIC | Age: 80
End: 2020-02-22
Attending: INTERNAL MEDICINE
Payer: MEDICARE

## 2020-02-22 VITALS
SYSTOLIC BLOOD PRESSURE: 134 MMHG | TEMPERATURE: 97.4 F | DIASTOLIC BLOOD PRESSURE: 79 MMHG | OXYGEN SATURATION: 98 % | HEART RATE: 85 BPM | RESPIRATION RATE: 16 BRPM

## 2020-02-22 DIAGNOSIS — M00.80 ARTHRITIS DUE TO OTHER BACTERIA, SEPTIC ARTHRITIS OF UNSPECIFIED LOCATION (H): Primary | ICD-10-CM

## 2020-02-22 PROCEDURE — 25000128 H RX IP 250 OP 636: Performed by: INTERNAL MEDICINE

## 2020-02-22 PROCEDURE — 25000125 ZZHC RX 250: Performed by: INTERNAL MEDICINE

## 2020-02-22 PROCEDURE — 96374 THER/PROPH/DIAG INJ IV PUSH: CPT

## 2020-02-22 RX ORDER — HEPARIN SODIUM (PORCINE) LOCK FLUSH IV SOLN 100 UNIT/ML 100 UNIT/ML
5 SOLUTION INTRAVENOUS
Status: CANCELLED | OUTPATIENT
Start: 2020-02-23

## 2020-02-22 RX ORDER — CEFTRIAXONE SODIUM 2 G
2 VIAL (EA) INJECTION ONCE
Status: COMPLETED | OUTPATIENT
Start: 2020-02-22 | End: 2020-02-22

## 2020-02-22 RX ORDER — HEPARIN SODIUM,PORCINE 10 UNIT/ML
5 VIAL (ML) INTRAVENOUS
Status: CANCELLED | OUTPATIENT
Start: 2020-02-23

## 2020-02-22 RX ORDER — CEFTRIAXONE SODIUM 2 G
2 VIAL (EA) INJECTION ONCE
Status: CANCELLED
Start: 2020-02-23

## 2020-02-22 RX ADMIN — CEFTRIAXONE SODIUM 2 G: 2 INJECTION, POWDER, FOR SOLUTION INTRAMUSCULAR; INTRAVENOUS at 11:23

## 2020-02-22 ASSESSMENT — PAIN SCALES - GENERAL: PAINLEVEL: MODERATE PAIN (4)

## 2020-02-22 NOTE — PROGRESS NOTES
Infusion Nursing Note:  Fausto Farr presents today for rocephin.    Patient seen by provider today: No   present during visit today: Not Applicable.    Note: N/A.    Intravenous Access:  PICC.    Treatment Conditions:  Not Applicable.      Post Infusion Assessment:  Patient tolerated infusion without incident.  Blood return noted pre and post infusion.  Site patent and intact, free from redness, edema or discomfort.  No evidence of extravasations.       Discharge Plan:   Discharge instructions reviewed with: Patient.  Patient and/or family verbalized understanding of discharge instructions and all questions answered.  Copy of AVS reviewed with patient and/or family.  Patient will return 2/23/20 for next appointment.  Patient discharged in stable condition accompanied by: self.  Departure Mode: Ambulatory.    Sandra Mobley RN

## 2020-02-23 ENCOUNTER — INFUSION THERAPY VISIT (OUTPATIENT)
Dept: INFUSION THERAPY | Facility: CLINIC | Age: 80
End: 2020-02-23
Attending: INTERNAL MEDICINE
Payer: MEDICARE

## 2020-02-23 ENCOUNTER — HOSPITAL ENCOUNTER (OUTPATIENT)
Facility: CLINIC | Age: 80
Setting detail: SPECIMEN
Discharge: HOME | End: 2020-02-23
Attending: INTERNAL MEDICINE | Admitting: INTERNAL MEDICINE
Payer: MEDICARE

## 2020-02-23 VITALS
TEMPERATURE: 97.6 F | RESPIRATION RATE: 16 BRPM | OXYGEN SATURATION: 98 % | SYSTOLIC BLOOD PRESSURE: 95 MMHG | HEART RATE: 79 BPM | DIASTOLIC BLOOD PRESSURE: 64 MMHG

## 2020-02-23 DIAGNOSIS — M00.80 ARTHRITIS DUE TO OTHER BACTERIA, SEPTIC ARTHRITIS OF UNSPECIFIED LOCATION (H): Primary | ICD-10-CM

## 2020-02-23 LAB
AST SERPL W P-5'-P-CCNC: 26 U/L (ref 0–45)
BASOPHILS # BLD AUTO: 0 10E9/L (ref 0–0.2)
BASOPHILS NFR BLD AUTO: 0.5 %
CREAT SERPL-MCNC: 0.83 MG/DL (ref 0.66–1.25)
CRP SERPL-MCNC: 6.6 MG/L (ref 0–8)
DIFFERENTIAL METHOD BLD: ABNORMAL
EOSINOPHIL # BLD AUTO: 0.2 10E9/L (ref 0–0.7)
EOSINOPHIL NFR BLD AUTO: 2.3 %
ERYTHROCYTE [DISTWIDTH] IN BLOOD BY AUTOMATED COUNT: 14.3 % (ref 10–15)
ERYTHROCYTE [SEDIMENTATION RATE] IN BLOOD BY WESTERGREN METHOD: 63 MM/H (ref 0–20)
GFR SERPL CREATININE-BSD FRML MDRD: 84 ML/MIN/{1.73_M2}
HCT VFR BLD AUTO: 34.6 % (ref 40–53)
HGB BLD-MCNC: 10.9 G/DL (ref 13.3–17.7)
IMM GRANULOCYTES # BLD: 0 10E9/L (ref 0–0.4)
IMM GRANULOCYTES NFR BLD: 0.3 %
LYMPHOCYTES # BLD AUTO: 1.8 10E9/L (ref 0.8–5.3)
LYMPHOCYTES NFR BLD AUTO: 20.1 %
MCH RBC QN AUTO: 28.1 PG (ref 26.5–33)
MCHC RBC AUTO-ENTMCNC: 31.5 G/DL (ref 31.5–36.5)
MCV RBC AUTO: 89 FL (ref 78–100)
MONOCYTES # BLD AUTO: 1 10E9/L (ref 0–1.3)
MONOCYTES NFR BLD AUTO: 11.7 %
NEUTROPHILS # BLD AUTO: 5.7 10E9/L (ref 1.6–8.3)
NEUTROPHILS NFR BLD AUTO: 65.1 %
NRBC # BLD AUTO: 0 10*3/UL
NRBC BLD AUTO-RTO: 0 /100
PLATELET # BLD AUTO: 439 10E9/L (ref 150–450)
RBC # BLD AUTO: 3.88 10E12/L (ref 4.4–5.9)
WBC # BLD AUTO: 8.7 10E9/L (ref 4–11)

## 2020-02-23 PROCEDURE — 96374 THER/PROPH/DIAG INJ IV PUSH: CPT

## 2020-02-23 PROCEDURE — 86140 C-REACTIVE PROTEIN: CPT | Performed by: INTERNAL MEDICINE

## 2020-02-23 PROCEDURE — 25000125 ZZHC RX 250: Performed by: INTERNAL MEDICINE

## 2020-02-23 PROCEDURE — 84450 TRANSFERASE (AST) (SGOT): CPT | Performed by: INTERNAL MEDICINE

## 2020-02-23 PROCEDURE — 85025 COMPLETE CBC W/AUTO DIFF WBC: CPT | Performed by: INTERNAL MEDICINE

## 2020-02-23 PROCEDURE — 25000128 H RX IP 250 OP 636: Performed by: INTERNAL MEDICINE

## 2020-02-23 PROCEDURE — 85652 RBC SED RATE AUTOMATED: CPT | Performed by: INTERNAL MEDICINE

## 2020-02-23 PROCEDURE — 82565 ASSAY OF CREATININE: CPT | Performed by: INTERNAL MEDICINE

## 2020-02-23 RX ORDER — HEPARIN SODIUM,PORCINE 10 UNIT/ML
5 VIAL (ML) INTRAVENOUS
Status: CANCELLED | OUTPATIENT
Start: 2020-02-24

## 2020-02-23 RX ORDER — HEPARIN SODIUM (PORCINE) LOCK FLUSH IV SOLN 100 UNIT/ML 100 UNIT/ML
5 SOLUTION INTRAVENOUS
Status: CANCELLED | OUTPATIENT
Start: 2020-02-24

## 2020-02-23 RX ORDER — CEFTRIAXONE SODIUM 2 G
2 VIAL (EA) INJECTION ONCE
Status: COMPLETED | OUTPATIENT
Start: 2020-02-23 | End: 2020-02-23

## 2020-02-23 RX ORDER — CEFTRIAXONE SODIUM 2 G
2 VIAL (EA) INJECTION ONCE
Status: CANCELLED
Start: 2020-02-24

## 2020-02-23 RX ADMIN — CEFTRIAXONE SODIUM 2 G: 2 INJECTION, POWDER, FOR SOLUTION INTRAMUSCULAR; INTRAVENOUS at 11:25

## 2020-02-23 ASSESSMENT — PAIN SCALES - GENERAL: PAINLEVEL: MODERATE PAIN (4)

## 2020-02-23 NOTE — PROGRESS NOTES
Infusion Nursing Note:  Fausto Farr presents today for labs and rocephin.    Patient seen by provider today: No   present during visit today: Not Applicable.    Note: N/A.    Intravenous Access:  PICC.    Treatment Conditions:  Lab Results   Component Value Date     02/14/2020                   Lab Results   Component Value Date    POTASSIUM 3.9 02/14/2020           Lab Results   Component Value Date    MAG 2.0 11/13/2019            Lab Results   Component Value Date    CR 1.00 02/16/2020                   Lab Results   Component Value Date    AWILDA 8.9 02/14/2020                Lab Results   Component Value Date    BILITOTAL 0.3 11/13/2019           Lab Results   Component Value Date    ALBUMIN 2.9 11/13/2019                    Lab Results   Component Value Date    ALT 18 11/13/2019           Lab Results   Component Value Date    AST 27 02/16/2020           Post Infusion Assessment:  Patient tolerated infusion without incident.  Site patent and intact, free from redness, edema or discomfort.  No evidence of extravasations.       Discharge Plan:   Discharge instructions reviewed with: Patient.  Patient and/or family verbalized understanding of discharge instructions and all questions answered.  Copy of AVS reviewed with patient and/or family.  Patient will return 2/24/20 for next appointment.  Patient discharged in stable condition accompanied by: self.  Departure Mode: Ambulatory.    Sandra Mobley RN

## 2020-02-24 ENCOUNTER — INFUSION THERAPY VISIT (OUTPATIENT)
Dept: INFUSION THERAPY | Facility: CLINIC | Age: 80
End: 2020-02-24
Attending: INTERNAL MEDICINE
Payer: MEDICARE

## 2020-02-24 ENCOUNTER — TRANSFERRED RECORDS (OUTPATIENT)
Dept: HEALTH INFORMATION MANAGEMENT | Facility: CLINIC | Age: 80
End: 2020-02-24

## 2020-02-24 VITALS
DIASTOLIC BLOOD PRESSURE: 73 MMHG | SYSTOLIC BLOOD PRESSURE: 148 MMHG | HEART RATE: 70 BPM | TEMPERATURE: 97.5 F | RESPIRATION RATE: 16 BRPM | OXYGEN SATURATION: 99 %

## 2020-02-24 DIAGNOSIS — M00.80 ARTHRITIS DUE TO OTHER BACTERIA, SEPTIC ARTHRITIS OF UNSPECIFIED LOCATION (H): Primary | ICD-10-CM

## 2020-02-24 PROCEDURE — 25000125 ZZHC RX 250: Performed by: INTERNAL MEDICINE

## 2020-02-24 PROCEDURE — 96374 THER/PROPH/DIAG INJ IV PUSH: CPT

## 2020-02-24 PROCEDURE — 25000128 H RX IP 250 OP 636: Performed by: INTERNAL MEDICINE

## 2020-02-24 RX ORDER — CEFTRIAXONE SODIUM 2 G
2 VIAL (EA) INJECTION ONCE
Status: COMPLETED | OUTPATIENT
Start: 2020-02-24 | End: 2020-02-24

## 2020-02-24 RX ORDER — HEPARIN SODIUM,PORCINE 10 UNIT/ML
5 VIAL (ML) INTRAVENOUS
Status: CANCELLED | OUTPATIENT
Start: 2020-02-25

## 2020-02-24 RX ORDER — CEFTRIAXONE SODIUM 2 G
2 VIAL (EA) INJECTION ONCE
Status: CANCELLED
Start: 2020-02-25

## 2020-02-24 RX ORDER — HEPARIN SODIUM (PORCINE) LOCK FLUSH IV SOLN 100 UNIT/ML 100 UNIT/ML
5 SOLUTION INTRAVENOUS
Status: CANCELLED | OUTPATIENT
Start: 2020-02-25

## 2020-02-24 RX ADMIN — CEFTRIAXONE SODIUM 2 G: 2 INJECTION, POWDER, FOR SOLUTION INTRAMUSCULAR; INTRAVENOUS at 10:41

## 2020-02-24 ASSESSMENT — PAIN SCALES - GENERAL: PAINLEVEL: MILD PAIN (2)

## 2020-02-24 NOTE — PROGRESS NOTES
Subjective     Fausto Farr is a 79 year old male who presents to clinic today for the following health issues:    HPI   Hypertension Follow-up      Do you check your blood pressure regularly outside of the clinic? Yes     Are you following a low salt diet? Yes    Are your blood pressures ever more than 140 on the top number (systolic) OR more   than 90 on the bottom number (diastolic), for example 140/90? No    Vascular Disease Follow-up      How often do you take nitroglycerin? Never    Do you take an aspirin every day? No      Interim hx:    Underwent total knee arthroplasty July 2019 with delayed wound healing with visible tendon. Hx mechanical aVR and MVR on warfarin, admitted for bridging prior to surgery Nov 2019. Underwent right gastrocnemius muscle transfer to right knee and split thickness skin graft from right thigh to knee    Hx atrial fibrillation s/p ablation 4/2019. Bradycardia post-surgery with intermittent 2nd degree AVB. EP recommend pacemaker. Metoprolol stopped during hospitalization. Discharged with cardiac monitor and f/u with EP as outpatient    Dehiscence - admitted 12/7 for debridement of wound and INR management    2/10 - second debriedment for infected gastrocnemius muscle flap and joint infection  Plan:  Ceftriaxone IV and metronidazole PO x 6 weeks - picc placed 2/13  Gets infusions daily (goes to infusion center).  Labs ordered weekly.  Saw cardiology - wore monitor x 30 days.  Delayed pace maker placement until knee wound healed.    Diarrhea started right after leaving hospital.  Watery with urgency.  Improved, now loose.  No blood in stool.  No abdominal pain.    Patient Active Problem List   Diagnosis     Rotator cuff (capsule) sprain     Somatic dysfunction of pelvic region     Contact dermatitis and other eczema, due to unspecified cause     Ulcerative colitis (H)     Atrial fibrillation (H)     Other specified anemias     Gastric ulcer     Bilateral low back pain with  left-sided sciatica     Nonallopathic lesion of lumbar region     Nonallopathic lesion of sacral region     Diaphragmatic hernia     Abdominal pain, epigastric     Malaise and fatigue     Nocturia     Nonallopathic lesion of cervical region     Nonallopathic lesion of thoracic region     Malignant neoplasm of prostate (H)     Abdominal aortic aneurysm (H)     Nonallopathic lesion of rib cage     Vitamin D deficiency disease     Anemia relative at 12.5 in 8-13      Essential hypertension, benign     Hyperlipidemia LDL goal <100     Prostate cancer (H)     Glucose intolerance (impaired glucose tolerance)     Sciatica of left side since 2000     Valvular heart disease     Heart murmur     MRSA (methicillin resistant Staphylococcus aureus)     Health Care Home     Urinary retention     Coronary artery disease     ACP (advance care planning)     AF (atrial fibrillation) (H)     S/P aortic valve replacement     S/P CABG (coronary artery bypass graft)     Stented coronary artery     Mixed hyperlipidemia     PVD (peripheral vascular disease) (H)     Abnormal ECG     Personal history of tobacco use, presenting hazards to health     Spinal stenosis of lumbar region without neurogenic claudication     S/P mitral valve replacement     RBBB with left anterior fascicular block     S/P lumbar spinal fusion     Long term current use of anticoagulant therapy     Chronic systolic congestive heart failure (H)     GAGE (obstructive sleep apnea)     Sepsis due to group B Streptococcus (H)     Typical atrial flutter (H)     Obesity (BMI 35.0-39.9) with comorbidity (H)     Knee pain, chronic     Total knee replacement status     Open wound of right knee     Septic joint (H)     Wound dehiscence     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, septic arthritis of unspecified location (H)     Past Surgical History:   Procedure Laterality Date     AORTIC VALVE REPLACEMENT  1/3/06    redo AVR SJM 21mm and SJM MVR 27mm in 2013SJM 21([id number]):AVR, SJM 27  :MVR-     APPLY WOUND VAC N/A 11/12/2019    Procedure: APPLICATION, WOUND VAC;  Surgeon: Sara Martinez MD;  Location:  OR     ARTHROPLASTY KNEE      right knee     ARTHROPLASTY KNEE Right 7/22/2019    Procedure: Right total knee arthroplasty;  Surgeon: Prasanth Jensen MD;  Location:  OR     BACK SURGERY  Oct 2015    Fusion L4-5, laminectomy L2, L3     BYPASS GRAFT ARTERY CORONARY  10/2013    reimplantation of radial artery graft to RCA     C CABG, VEIN, TWO  1/3/06    Left radial to RCA, LIMA to LAD (RA to RCA reimplanted at time of 2013 surg)     CARDIAC CATHERIZATION  11/2005    Stent placed to RCA     CARDIAC CATHERIZATION  04/2013    Occluded RCA, patent LIMA to LAD and radial graft to PDA     CARPAL TUNNEL RELEASE RT/LT  1994     COLONOSCOPY  8-22-11     CYSTOSCOPY FLEXIBLE  10/16/2013    Procedure: CYSTOSCOPY FLEXIBLE;  FLEXIBLE CYSTOSCOPY / DILATION OF URETHRA / INSERTION OF LESLIE;  Surgeon: Cooper Wallace MD;  Location:  OR     ENDOVASCULAR REPAIR, INFRARENAL ABDOMINAL AORTIC ANEURYSM/DISSECTION; MODULAR BIFURCATED PROSTHESIS  2006    AAA repair endovascular     ENT SURGERY       EP ABLATION ATRIAL FLUTTER N/A 4/22/2019    Procedure: EP Ablation Atrial Flutter;  Surgeon: Jessy Vasquez MD;  Location:  HE[facility] CATH LAB     GENITOURINARY SURGERY  6/16/08    radioactive seeding     GRAFT FLAP PEDICLE EXTREMITY (LOCATION) Right 11/12/2019    Procedure: SURGICAL PROCUREMENT, FLAP, PEDICLE, EXTREMITY;  Surgeon: Sara Martinez MD;  Location:  OR     GRAFT SKIN SPLIT THICKNESS FROM EXTREMITY Right 11/12/2019    Procedure: RIGHT GASTRONEMIUS FLAP TO RIGHT KNEE, SPLIT THICKNESS SKIN GRAFT FROM RIGHT THIGH TO RIGHT KNEE, SURGICAL PROCUREMENT, FLAP, PEDICLE, EXTREMITY, WOUND VAC PLACEMENT;  Surgeon: Sara Martinez MD;  Location:  OR     HEAD & NECK SURGERY  1997    vocal cord polypectomy     INCISION AND DRAINAGE KNEE, COMBINED Right 8/29/2019     Procedure: INCISION AND DRAINAGE, KNEE W/ Secondary Wound Closure;  Surgeon: Prasanth Jensen MD;  Location: [facility] OR     IRRIGATION AND DEBRIDEMENT KNEE, PLACE ANTIBIOTIC CEMENT BEADS / SPACE Right 2020    Procedure: 1. Irrigation and debridement of wound breakdown, right total knee arthroplasty.  2. Irrigation and debridement of right total knee with placement of antibiotic-impregnated (vancomycin) absorbable antibiotic beads.;  Surgeon: Prasanth Jensen MD;  Location: [facility] OR     IRRIGATION AND DEBRIDEMENT LOWER EXTREMITY, COMBINED Right 2019    Procedure: DEBRIDEMENT OF RIGHT CALF AND WOUND CLOSURE.;  Surgeon: Sara Martinez MD;  Location:  OR     KNEE SURGERY   Right knee arthroscopy     OPTICAL TRACKING SYSTEM FUSION SPINE POSTERIOR LUMBAR THREE+ LEVELS N/A 10/29/2015    Procedure: OPTICAL TRACKING SYSTEM FUSION SPINE POSTERIOR LUMBAR THREE+ LEVELS;  Surgeon: Walt Garcia MD;  Location:  OR     PROSTATE SURGERY      radioactive seeding 08     REPAIR ANEURYSM ABDOMINAL AORTA       REPAIR VALVE MITRAL  10/16/2013    SJM 21(AGFN [id number]):AVR, SJM 27 :MVRProcedure: REPAIR VALVE MITRAL;  REDO STERNOTOMY/REDO AORTIC VALVE REPLACEMENT/ MITRAL VALVE REPLACEMENT/REIMPLANTATION OF RIGHT CORONARY ARTERY BYPASS WITH RACHAEL ( ON PUMP);  Surgeon: Viet Singh MD;  Location:  OR     REPLACE VALVE AORTIC  10/16/2013    Procedure: REPLACE VALVE AORTIC;;  Surgeon: Viet Singh MD;  Location:  OR     SURGERY GENERAL IP CONSULT  2008 Excision aneurysm abdominal aorta     SURGERY GENERAL IP CONSULT   Vocal cord polypectomy     VASCULAR SURGERY  1993     varicose vein stripping       Social History     Tobacco Use     Smoking status: Former Smoker     Packs/day: 1.00     Years: 40.00     Pack years: 40.00     Start date: 4/15/1962     Last attempt to quit: 10/23/2002     Years since quittin.3     Smokeless tobacco: Never Used   Substance Use Topics      Alcohol use: Yes     Frequency: 2-4 times a month     Drinks per session: 1 or 2     Comment: a couple beers per week (socially)     Family History   Problem Relation Age of Onset     No Known Problems Mother      Coronary Artery Disease Father         CABG     Heart Disease Father         Pacemaker     No Known Problems Brother      No Known Problems Sister      No Known Problems Son      Other Cancer Daughter      No Known Problems Daughter      Heart Disease Brother      Other - See Comments Grandchild          Current Outpatient Medications   Medication Sig Dispense Refill     cefTRIAXone (ROCEPHIN) 1 GM vial Inject 2 g (2,000 mg) into the vein daily ESR,CRP,CBC with differential, creatinine, SGOT weekly while on this medication to be faxed to Dr. Leija office. 600 mL 0     cholecalciferol 25 MCG (1000 UT) TABS Take 1,000 Units by mouth daily       ezetimibe (ZETIA) 10 MG tablet Take 1 tablet (10 mg) by mouth daily 90 tablet 3     ferrous sulfate (FEROSUL) 325 (65 Fe) MG tablet Take 325 mg by mouth daily (with breakfast)       fluocinonide (LIDEX) 0.05 % external ointment Apply topically 2 times daily as needed       furosemide (LASIX) 40 MG tablet Take 0.5 tablets (20 mg) by mouth daily 45 tablet 3     mesalamine (ASACOL HD) 800 MG EC tablet Take 1 tablet (800 mg) by mouth 2 times daily 180 tablet 11     methocarbamol (ROBAXIN) 750 MG tablet Take 1 tablet (750 mg) by mouth every 6 hours as needed for muscle spasms 30 tablet 0     metroNIDAZOLE (FLAGYL) 500 MG tablet Take 1 tablet (500 mg) by mouth 2 times daily 84 tablet 0     rosuvastatin (CRESTOR) 40 MG tablet Take 1 tablet (40 mg) by mouth daily 90 tablet 3     tamsulosin (FLOMAX) 0.4 MG capsule TAKE 1 CAPSULE BY MOUTH EVERY DAY 90 capsule 3     warfarin ANTICOAGULANT (COUMADIN) 5 MG tablet Take 5mg on Tuesday, Wednesday, and Friday.  Take 7.5mg all other days of the week, or as directed by anticoagulation clinic.       acetaminophen (TYLENOL) 500 MG  tablet Take 500-1,000 mg by mouth every 6 hours as needed for pain       betamethasone valerate (VALISONE) 0.1 % external lotion Apply topically 2 times daily Apply topically to scalp twice daily PRN 60 mL 3     glucosamine-chondroitin 500-400 MG CAPS per capsule Take 1 capsule by mouth 2 times daily       oxyCODONE (ROXICODONE) 5 MG tablet Take 5 mg by mouth every 6 hours as needed       Allergies   Allergen Reactions     Bees Anaphylaxis     Recent Labs   Lab Test 02/23/20  1127 02/16/20  1020  02/14/20  0530 02/13/20  0639  11/13/19  0804  05/30/19  1101  07/12/18  0747  05/08/18  1043  04/25/17  1244  10/19/13  0430  10/17/13  0520   A1C  --   --   --   --   --   --   --   --   --   --   --   --   --   --  5.9  --  6.8*  --  5.7   LDL  --   --   --   --   --   --   --   --  92  --  59  --  52  --  57   < >  --   --   --    HDL  --   --   --   --   --   --   --   --  43  --  36*  --  32*  --  36*   < >  --   --   --    TRIG  --   --   --   --   --   --   --   --  110  --  165*  --  176*  --  206*   < >  --   --   --    ALT  --   --   --   --   --   --  18  --  70  --  28  --  32  --  27   < >  --   --   --    CR 0.83 1.00   < > 0.84 0.97   < > 0.79   < > 0.97   < > 1.01   < > 0.91  --  1.00   < > 1.22   < > 1.92*   GFRESTIMATED 84 71   < > 84 74   < > 86   < > 74   < > 72   < > 81   < > 73   < > 58*   < > 35*   GFRESTBLACK >90 83   < > >90 86   < > >90   < > 86   < > 87   < > >90   < > 88   < > 71   < > 42*   POTASSIUM  --   --   --  3.9 3.8   < > 4.4   < > 4.6   < > 4.0   < > 4.0  --  4.5   < > 4.8   < > 4.6   TSH  --   --   --   --   --   --  0.54  --   --   --   --   --  0.96  --   --   --   --   --   --     < > = values in this interval not displayed.      BP Readings from Last 3 Encounters:   02/25/20 108/70   02/24/20 (!) 148/73   02/23/20 95/64    Wt Readings from Last 3 Encounters:   02/25/20 91.5 kg (201 lb 12.8 oz)   02/10/20 89.6 kg (197 lb 9.6 oz)   02/07/20 90.2 kg (198 lb 14.4 oz)                     Reviewed and updated as needed this visit by Provider         Review of Systems   All other systems on a 10-point review are negative, unless otherwise noted in HPI        Objective    /70 (BP Location: Right arm, Patient Position: Sitting, Cuff Size: Adult Regular)   Pulse 84   Temp 97.5  F (36.4  C) (Oral)   Resp 14   Wt 91.5 kg (201 lb 12.8 oz)   SpO2 97%   BMI 33.07 kg/m    Body mass index is 33.07 kg/m .  Physical Exam   GENERAL: healthy, alert and no distress  NECK: no adenopathy, no asymmetry, masses, or scars and thyroid normal to palpation  RESP: lungs clear to auscultation - no rales, rhonchi or wheezes  CV: regular rate and rhythm, S1 click, normal S2, no S3 or S4, click or rub, no peripheral edema and peripheral pulses strong  ABDOMEN: soft, nontender, no hepatosplenomegaly, no masses and bowel sounds normal  MS: right LE in full cast.    Diagnostic Test Results:  Labs reviewed in Epic        Assessment & Plan       ICD-10-CM    1. Hospital discharge follow-up Z09    2. 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 Wound dehiscence T81.30XA    4. AV block, Mobitz 2 I44.1     Delaying pacemaker until right knee wound healed   5. S/P aortic valve replacement Z95.2    6. Long term current use of anticoagulant therapy Z79.01               Patient Instructions   It was a pleasure seeing you today.    During today's visit, we reviewed your hospitalizations and follow-up plan.  Continue plan per surgeon and ID doctor.  You will need to schedule a follow-up with cardiology after your knee heals fully.  For diarrhea - continue to monitor.  If worsening diarrhea, fever, or blood in stool, please contact someone from my care team for evaluation.  Otherwise, I recommend continuing daily probiotics while on antibiotics.    Follow-up with me in 6 weeks.    Do not hesitate to contact the clinic via [employer] or phone [phone] with  "questions about your health.  If you need more personalized care, please ask to speak with someone from my \"care team.\"    In addition to clinic appointments, we offer phone and E-visit encounters when deemed appropriate.          Return in about 6 weeks (around 4/7/2020) for Follow up Office Visit.    Chris Flower MD  [facility]    "

## 2020-02-24 NOTE — PROGRESS NOTES
Infusion Nursing Note:  Fausto Farr presents today for Rocephin.    Patient seen by provider today: No   present during visit today: Not Applicable.    Note: Patient states he is tolerating the Rocephin.  Is having around 4 diarrhea stools/day.  Has a f/u with ortho today and is hoping that his brace will come off today.    Intravenous Access:  PICC.    Treatment Conditions:  Not Applicable.      Post Infusion Assessment:  Patient tolerated infusion without incident.  Blood return noted pre and post infusion.  Site patent and intact, free from redness, edema or discomfort.  No evidence of extravasations.       Discharge Plan:   Discharge instructions reviewed with: Patient.  Patient discharged in stable condition accompanied by: self.  Departure Mode: Ambulatory.   Next Rocephin is scheduled for tomorrow.    RICKY DE LA TORRE RN

## 2020-02-25 ENCOUNTER — ALLIED HEALTH/NURSE VISIT (OUTPATIENT)
Dept: INFUSION THERAPY | Facility: CLINIC | Age: 80
End: 2020-02-25
Attending: INTERNAL MEDICINE
Payer: MEDICARE

## 2020-02-25 ENCOUNTER — OFFICE VISIT (OUTPATIENT)
Dept: PEDIATRICS | Facility: CLINIC | Age: 80
End: 2020-02-25
Payer: MEDICARE

## 2020-02-25 VITALS
WEIGHT: 201.8 LBS | OXYGEN SATURATION: 97 % | TEMPERATURE: 97.5 F | DIASTOLIC BLOOD PRESSURE: 70 MMHG | BODY MASS INDEX: 33.07 KG/M2 | SYSTOLIC BLOOD PRESSURE: 108 MMHG | RESPIRATION RATE: 14 BRPM | HEART RATE: 84 BPM

## 2020-02-25 DIAGNOSIS — I44.1 AV BLOCK, MOBITZ 2: ICD-10-CM

## 2020-02-25 DIAGNOSIS — M00.862 ARTHRITIS OF LEFT KNEE DUE TO OTHER BACTERIA (H): ICD-10-CM

## 2020-02-25 DIAGNOSIS — Z09 HOSPITAL DISCHARGE FOLLOW-UP: Primary | ICD-10-CM

## 2020-02-25 DIAGNOSIS — Z95.2 S/P AORTIC VALVE REPLACEMENT: ICD-10-CM

## 2020-02-25 DIAGNOSIS — T81.30XA WOUND DEHISCENCE: ICD-10-CM

## 2020-02-25 DIAGNOSIS — Z79.01 LONG TERM CURRENT USE OF ANTICOAGULANT THERAPY: ICD-10-CM

## 2020-02-25 DIAGNOSIS — M00.80 ARTHRITIS DUE TO OTHER BACTERIA, SEPTIC ARTHRITIS OF UNSPECIFIED LOCATION (H): Primary | ICD-10-CM

## 2020-02-25 PROCEDURE — 25000125 ZZHC RX 250: Performed by: INTERNAL MEDICINE

## 2020-02-25 PROCEDURE — 99214 OFFICE O/P EST MOD 30 MIN: CPT | Performed by: INTERNAL MEDICINE

## 2020-02-25 PROCEDURE — 96374 THER/PROPH/DIAG INJ IV PUSH: CPT

## 2020-02-25 PROCEDURE — 25000128 H RX IP 250 OP 636: Performed by: INTERNAL MEDICINE

## 2020-02-25 RX ORDER — HEPARIN SODIUM,PORCINE 10 UNIT/ML
5 VIAL (ML) INTRAVENOUS
Status: CANCELLED | OUTPATIENT
Start: 2020-02-26

## 2020-02-25 RX ORDER — CEFTRIAXONE SODIUM 2 G
2 VIAL (EA) INJECTION ONCE
Status: CANCELLED
Start: 2020-02-26

## 2020-02-25 RX ORDER — CEFTRIAXONE SODIUM 2 G
2 VIAL (EA) INJECTION ONCE
Status: COMPLETED | OUTPATIENT
Start: 2020-02-25 | End: 2020-02-25

## 2020-02-25 RX ORDER — HEPARIN SODIUM (PORCINE) LOCK FLUSH IV SOLN 100 UNIT/ML 100 UNIT/ML
5 SOLUTION INTRAVENOUS
Status: CANCELLED | OUTPATIENT
Start: 2020-02-26

## 2020-02-25 RX ADMIN — WATER 2 G: 1 INJECTION INTRAMUSCULAR; INTRAVENOUS; SUBCUTANEOUS at 13:41

## 2020-02-25 NOTE — PATIENT INSTRUCTIONS
"It was a pleasure seeing you today.    During today's visit, we reviewed your hospitalizations and follow-up plan.  Continue plan per surgeon and ID doctor.  You will need to schedule a follow-up with cardiology after your knee heals fully.  For diarrhea - continue to monitor.  If worsening diarrhea, fever, or blood in stool, please contact someone from my care team for evaluation.  Otherwise, I recommend continuing daily probiotics while on antibiotics.    Follow-up with me in 6 weeks.    Do not hesitate to contact the clinic via [employer] or phone [phone] with questions about your health.  If you need more personalized care, please ask to speak with someone from my \"care team.\"    In addition to clinic appointments, we offer phone and E-visit encounters when deemed appropriate.      "

## 2020-02-25 NOTE — PROGRESS NOTES
Infusion Nursing Note:  Fausto Farr presents today for Rocephin.    Patient seen by provider today: No   present during visit today: Not Applicable.    Note: N/A.    Intravenous Access:  PICC.    Treatment Conditions:  Not Applicable.      Post Infusion Assessment:  Patient tolerated infusion without incident.  Blood return noted pre and post infusion.  Site patent and intact, free from redness, edema or discomfort.  No evidence of extravasations.       Discharge Plan:   Discharge instructions reviewed with: Patient.  Patient and/or family verbalized understanding of discharge instructions and all questions answered.  AVS to patient via [email or web address].  Patient will return 2/26/2020 for next appointment.   Patient discharged in stable condition accompanied by: wife.  Departure Mode: Ambulatory.    Dianelys Hanley RN

## 2020-02-26 ENCOUNTER — ALLIED HEALTH/NURSE VISIT (OUTPATIENT)
Dept: INFUSION THERAPY | Facility: CLINIC | Age: 80
End: 2020-02-26
Attending: INTERNAL MEDICINE
Payer: MEDICARE

## 2020-02-26 ENCOUNTER — TELEPHONE (OUTPATIENT)
Dept: PEDIATRICS | Facility: CLINIC | Age: 80
End: 2020-02-26

## 2020-02-26 ENCOUNTER — HOSPITAL ENCOUNTER (OUTPATIENT)
Facility: CLINIC | Age: 80
Setting detail: SPECIMEN
Discharge: HOME | End: 2020-02-26
Attending: INTERNAL MEDICINE | Admitting: INTERNAL MEDICINE
Payer: MEDICARE

## 2020-02-26 DIAGNOSIS — I48.91 AF (ATRIAL FIBRILLATION) (H): ICD-10-CM

## 2020-02-26 DIAGNOSIS — M00.80 ARTHRITIS DUE TO OTHER BACTERIA, SEPTIC ARTHRITIS OF UNSPECIFIED LOCATION (H): Primary | ICD-10-CM

## 2020-02-26 DIAGNOSIS — Z95.2 S/P MITRAL VALVE REPLACEMENT: ICD-10-CM

## 2020-02-26 DIAGNOSIS — Z79.01 LONG TERM CURRENT USE OF ANTICOAGULANT THERAPY: ICD-10-CM

## 2020-02-26 LAB — INR PPP: 2.24 (ref 0.86–1.14)

## 2020-02-26 PROCEDURE — 25000128 H RX IP 250 OP 636: Performed by: INTERNAL MEDICINE

## 2020-02-26 PROCEDURE — 96374 THER/PROPH/DIAG INJ IV PUSH: CPT

## 2020-02-26 PROCEDURE — 25000125 ZZHC RX 250: Performed by: INTERNAL MEDICINE

## 2020-02-26 PROCEDURE — 85610 PROTHROMBIN TIME: CPT | Performed by: INTERNAL MEDICINE

## 2020-02-26 RX ORDER — CEFTRIAXONE SODIUM 2 G
2 VIAL (EA) INJECTION ONCE
Status: COMPLETED | OUTPATIENT
Start: 2020-02-26 | End: 2020-02-26

## 2020-02-26 RX ORDER — HEPARIN SODIUM (PORCINE) LOCK FLUSH IV SOLN 100 UNIT/ML 100 UNIT/ML
5 SOLUTION INTRAVENOUS
Status: CANCELLED | OUTPATIENT
Start: 2020-02-27

## 2020-02-26 RX ORDER — HEPARIN SODIUM,PORCINE 10 UNIT/ML
5 VIAL (ML) INTRAVENOUS
Status: CANCELLED | OUTPATIENT
Start: 2020-02-27

## 2020-02-26 RX ORDER — CEFTRIAXONE SODIUM 2 G
2 VIAL (EA) INJECTION ONCE
Status: CANCELLED
Start: 2020-02-27

## 2020-02-26 RX ADMIN — CEFTRIAXONE SODIUM 2 G: 2 INJECTION, POWDER, FOR SOLUTION INTRAMUSCULAR; INTRAVENOUS at 09:45

## 2020-02-26 NOTE — TELEPHONE ENCOUNTER
ANTICOAGULATION MANAGEMENT     Patient Name:  Fausto Farr  Date:  2020    ASSESSMENT /SUBJECTIVE:      Today's INR result of 2.24 is subtherapeutic. Goal INR of 2.0-3.0      Warfarin dose taken: Warfarin taken as previously instructed    Diet: No new diet changes affecting INR    Medication changes/ interactions: Interaction between Rocephin and warfarin may be affecting INR    Previous INR: Therapeutic     S/S of bleeding or thromboembolism: No    New injury or illness:  No    Upcoming surgery, procedure or cardioversion:  No    Additional findings: none      PLAN:    Spoke with Fausto regarding INR result and instructed:     Warfarin Dosing Instructions: Change your warfarin dose to 5 mg every Mon, Fri; 7.5 mg all other days    Instructed patient to follow up no later than: 1 week  Patient offered & declined to schedule next visit    Education provided: Yes: significance of INR result      Ralph verbalizes understanding and agrees to warfarin dosing plan.    Instructed to call the Anticoagulation Clinic for any changes, questions or concerns. (#[phone])        OBJECTIVE:  INR   Date Value Ref Range Status   2020 2.24 (H) 0.86 - 1.14 Final             Anticoagulation Summary  As of 2020    INR goal:   2.5-3.5   TTR:   72.8 % (10.9 mo)   INR used for dosin.24! (2020)   Warfarin maintenance plan:   5 mg (5 mg x 1) every Mon, Fri; 7.5 mg (5 mg x 1.5) all other days   Full warfarin instructions:   5 mg every Mon, Fri; 7.5 mg all other days   Weekly warfarin total:   47.5 mg   Plan last modified:   Cecy Nails RN (2020)   Next INR check:   3/4/2020   Priority:   High   Target end date:   Indefinite    Indications    AF (atrial fibrillation) (H) [I48.91]  S/P mitral valve replacement [Z95.2]  Long term current use of anticoagulant therapy [Z79.01]             Anticoagulation Episode Summary     INR check location:       Preferred lab:   EXTERNAL LAB    Send INR reminders to:    SHANNA PERRY    Comments:   5mg tabs - andrade dose // transfer from [facility] // [facility] // CALENDAR // right knee replaced 7/22/19 //Home care      Anticoagulation Care Providers     Provider Role Specialty Phone number    Jodi Flower Mai, MD  Internal Medicine [phone]

## 2020-02-26 NOTE — PROGRESS NOTES
Infusion Nursing Note:  Fausto Farr presents today for rocephin and INR.    Patient seen by provider today: No   present during visit today: Not Applicable.    Note: N/A.    Intravenous Access:  PICC.    Treatment Conditions:  Not Applicable.      Post Infusion Assessment:  Patient tolerated infusion without incident.  Blood return noted pre and post infusion.  Site patent and intact, free from redness, edema or discomfort.  No evidence of extravasations.  Access discontinued per protocol.       Discharge Plan:   Patient declined prescription refills.  Discharge instructions reviewed with: Patient.  Patient and/or family verbalized understanding of discharge instructions and all questions answered.  Copy of AVS reviewed with patient and/or family.  Patient will return 2/27/2020 for next appointment.  Patient discharged in stable condition accompanied by: self.  Departure Mode: Ambulatory.    Angelika Paniagua RN

## 2020-02-27 ENCOUNTER — ALLIED HEALTH/NURSE VISIT (OUTPATIENT)
Dept: INFUSION THERAPY | Facility: CLINIC | Age: 80
End: 2020-02-27
Attending: INTERNAL MEDICINE
Payer: MEDICARE

## 2020-02-27 VITALS
DIASTOLIC BLOOD PRESSURE: 72 MMHG | TEMPERATURE: 97.3 F | OXYGEN SATURATION: 96 % | HEART RATE: 79 BPM | SYSTOLIC BLOOD PRESSURE: 132 MMHG

## 2020-02-27 DIAGNOSIS — M00.80 ARTHRITIS DUE TO OTHER BACTERIA, SEPTIC ARTHRITIS OF UNSPECIFIED LOCATION (H): Primary | ICD-10-CM

## 2020-02-27 PROCEDURE — 25000128 H RX IP 250 OP 636: Performed by: INTERNAL MEDICINE

## 2020-02-27 PROCEDURE — 96374 THER/PROPH/DIAG INJ IV PUSH: CPT

## 2020-02-27 PROCEDURE — 25000125 ZZHC RX 250: Performed by: INTERNAL MEDICINE

## 2020-02-27 RX ORDER — CEFTRIAXONE SODIUM 2 G
2 VIAL (EA) INJECTION ONCE
Status: COMPLETED | OUTPATIENT
Start: 2020-02-27 | End: 2020-02-27

## 2020-02-27 RX ORDER — HEPARIN SODIUM,PORCINE 10 UNIT/ML
5 VIAL (ML) INTRAVENOUS
Status: CANCELLED | OUTPATIENT
Start: 2020-02-28

## 2020-02-27 RX ORDER — HEPARIN SODIUM (PORCINE) LOCK FLUSH IV SOLN 100 UNIT/ML 100 UNIT/ML
5 SOLUTION INTRAVENOUS
Status: CANCELLED | OUTPATIENT
Start: 2020-02-28

## 2020-02-27 RX ORDER — CEFTRIAXONE SODIUM 2 G
2 VIAL (EA) INJECTION ONCE
Status: CANCELLED
Start: 2020-02-28

## 2020-02-27 RX ADMIN — CEFTRIAXONE SODIUM 2 G: 2 INJECTION, POWDER, FOR SOLUTION INTRAMUSCULAR; INTRAVENOUS at 10:13

## 2020-02-27 NOTE — PROGRESS NOTES
Infusion Nursing Note:  Fausto Farr presents today for Rocephin.    Patient seen by provider today: No   present during visit today: Not Applicable.    Note: N/A.    Intravenous Access:  PICC -- dressing changed per protocol     Treatment Conditions:  Not Applicable.      Post Infusion Assessment:  Patient tolerated infusion without incident.  Blood return noted pre and post infusion.  Site patent and intact, free from redness, edema or discomfort.  No evidence of extravasations.       Discharge Plan:   Discharge instructions reviewed with: Patient.  Patient and/or family verbalized understanding of discharge instructions and all questions answered.  AVS to patient via [employer]!.  Patient will return 2/28/20 for next dose of Rocephin for next appointment.   Patient discharged in stable condition accompanied by: self.  Departure Mode: Ambulatory.    Maura Hair RN

## 2020-02-28 ENCOUNTER — ALLIED HEALTH/NURSE VISIT (OUTPATIENT)
Dept: INFUSION THERAPY | Facility: CLINIC | Age: 80
End: 2020-02-28
Attending: INTERNAL MEDICINE
Payer: MEDICARE

## 2020-02-28 VITALS
OXYGEN SATURATION: 94 % | DIASTOLIC BLOOD PRESSURE: 70 MMHG | SYSTOLIC BLOOD PRESSURE: 134 MMHG | HEART RATE: 78 BPM | RESPIRATION RATE: 16 BRPM | TEMPERATURE: 97.4 F

## 2020-02-28 DIAGNOSIS — M00.80 ARTHRITIS DUE TO OTHER BACTERIA, SEPTIC ARTHRITIS OF UNSPECIFIED LOCATION (H): Primary | ICD-10-CM

## 2020-02-28 PROCEDURE — 96374 THER/PROPH/DIAG INJ IV PUSH: CPT

## 2020-02-28 PROCEDURE — 25000125 ZZHC RX 250: Performed by: INTERNAL MEDICINE

## 2020-02-28 PROCEDURE — 25000128 H RX IP 250 OP 636: Performed by: INTERNAL MEDICINE

## 2020-02-28 RX ORDER — CEFTRIAXONE SODIUM 2 G
2 VIAL (EA) INJECTION ONCE
Status: CANCELLED
Start: 2020-02-29

## 2020-02-28 RX ORDER — HEPARIN SODIUM,PORCINE 10 UNIT/ML
5 VIAL (ML) INTRAVENOUS
Status: CANCELLED | OUTPATIENT
Start: 2020-02-29

## 2020-02-28 RX ORDER — CEFTRIAXONE SODIUM 2 G
2 VIAL (EA) INJECTION ONCE
Status: COMPLETED | OUTPATIENT
Start: 2020-02-28 | End: 2020-02-28

## 2020-02-28 RX ORDER — HEPARIN SODIUM (PORCINE) LOCK FLUSH IV SOLN 100 UNIT/ML 100 UNIT/ML
5 SOLUTION INTRAVENOUS
Status: CANCELLED | OUTPATIENT
Start: 2020-02-29

## 2020-02-28 RX ADMIN — CEFTRIAXONE SODIUM 2 G: 2 INJECTION, POWDER, FOR SOLUTION INTRAMUSCULAR; INTRAVENOUS at 10:43

## 2020-02-28 NOTE — PROGRESS NOTES
Nursing Note:  Fausto Farr presents today for rocephin.    Patient seen by provider today: No   present during visit today: Not Applicable.    Note: N/A.    Intravenous Access:  PICC.    Discharge Plan:   Patient was sent home    Angelika Paniagua RN

## 2020-02-29 ENCOUNTER — INFUSION THERAPY VISIT (OUTPATIENT)
Dept: INFUSION THERAPY | Facility: CLINIC | Age: 80
End: 2020-02-29
Attending: INTERNAL MEDICINE
Payer: MEDICARE

## 2020-02-29 VITALS
SYSTOLIC BLOOD PRESSURE: 139 MMHG | DIASTOLIC BLOOD PRESSURE: 79 MMHG | HEART RATE: 56 BPM | TEMPERATURE: 97.4 F | RESPIRATION RATE: 16 BRPM

## 2020-02-29 DIAGNOSIS — M00.80 ARTHRITIS DUE TO OTHER BACTERIA, SEPTIC ARTHRITIS OF UNSPECIFIED LOCATION (H): Primary | ICD-10-CM

## 2020-02-29 PROCEDURE — 25000128 H RX IP 250 OP 636: Performed by: INTERNAL MEDICINE

## 2020-02-29 PROCEDURE — 25000125 ZZHC RX 250: Performed by: INTERNAL MEDICINE

## 2020-02-29 PROCEDURE — 96374 THER/PROPH/DIAG INJ IV PUSH: CPT

## 2020-02-29 RX ORDER — CEFTRIAXONE SODIUM 2 G
2 VIAL (EA) INJECTION ONCE
Status: CANCELLED
Start: 2020-03-01

## 2020-02-29 RX ORDER — CEFTRIAXONE SODIUM 2 G
2 VIAL (EA) INJECTION ONCE
Status: COMPLETED | OUTPATIENT
Start: 2020-02-29 | End: 2020-02-29

## 2020-02-29 RX ORDER — HEPARIN SODIUM,PORCINE 10 UNIT/ML
5 VIAL (ML) INTRAVENOUS
Status: CANCELLED | OUTPATIENT
Start: 2020-03-01

## 2020-02-29 RX ORDER — HEPARIN SODIUM (PORCINE) LOCK FLUSH IV SOLN 100 UNIT/ML 100 UNIT/ML
5 SOLUTION INTRAVENOUS
Status: CANCELLED | OUTPATIENT
Start: 2020-03-01

## 2020-02-29 RX ADMIN — CEFTRIAXONE SODIUM 2 G: 2 INJECTION, POWDER, FOR SOLUTION INTRAMUSCULAR; INTRAVENOUS at 11:21

## 2020-02-29 ASSESSMENT — PAIN SCALES - GENERAL: PAINLEVEL: MILD PAIN (2)

## 2020-02-29 NOTE — PROGRESS NOTES
Infusion Nursing Note:  Fausto Farr presents today for rocephin.    Patient seen by provider today: No   present during visit today: Not Applicable.    Note: N/A.    Intravenous Access:  PICC.    Treatment Conditions:  Not Applicable.      Post Infusion Assessment:  Patient tolerated infusion without incident.  Blood return noted pre and post infusion.  Site patent and intact, free from redness, edema or discomfort.  No evidence of extravasations.       Discharge Plan:   Patient declined prescription refills.  Discharge instructions reviewed with: Patient.  Patient and/or family verbalized understanding of discharge instructions and all questions answered.  AVS to patient via [employer].  Patient will return 3/1/2020 for next appointment.   Patient discharged in stable condition accompanied by: self.  Departure Mode: Ambulatory.    Nalini Vieyra RN

## 2020-03-01 ENCOUNTER — HOSPITAL ENCOUNTER (OUTPATIENT)
Facility: CLINIC | Age: 80
Setting detail: SPECIMEN
Discharge: HOME | End: 2020-03-01
Attending: INTERNAL MEDICINE | Admitting: INTERNAL MEDICINE
Payer: MEDICARE

## 2020-03-01 ENCOUNTER — INFUSION THERAPY VISIT (OUTPATIENT)
Dept: INFUSION THERAPY | Facility: CLINIC | Age: 80
End: 2020-03-01
Attending: INTERNAL MEDICINE
Payer: MEDICARE

## 2020-03-01 VITALS
HEART RATE: 80 BPM | TEMPERATURE: 97.6 F | RESPIRATION RATE: 16 BRPM | DIASTOLIC BLOOD PRESSURE: 75 MMHG | SYSTOLIC BLOOD PRESSURE: 129 MMHG

## 2020-03-01 DIAGNOSIS — M00.80 ARTHRITIS DUE TO OTHER BACTERIA, SEPTIC ARTHRITIS OF UNSPECIFIED LOCATION (H): Primary | ICD-10-CM

## 2020-03-01 LAB
AST SERPL W P-5'-P-CCNC: 27 U/L (ref 0–45)
BACTERIA SPEC CULT: ABNORMAL
BACTERIA SPEC CULT: ABNORMAL
BASOPHILS # BLD AUTO: 0 10E9/L (ref 0–0.2)
BASOPHILS NFR BLD AUTO: 0.4 %
CREAT SERPL-MCNC: 0.94 MG/DL (ref 0.66–1.25)
CRP SERPL-MCNC: 8 MG/L (ref 0–8)
DIFFERENTIAL METHOD BLD: ABNORMAL
EOSINOPHIL # BLD AUTO: 0.3 10E9/L (ref 0–0.7)
EOSINOPHIL NFR BLD AUTO: 3.2 %
ERYTHROCYTE [DISTWIDTH] IN BLOOD BY AUTOMATED COUNT: 14.5 % (ref 10–15)
ERYTHROCYTE [SEDIMENTATION RATE] IN BLOOD BY WESTERGREN METHOD: 59 MM/H (ref 0–20)
GFR SERPL CREATININE-BSD FRML MDRD: 77 ML/MIN/{1.73_M2}
HCT VFR BLD AUTO: 36.9 % (ref 40–53)
HGB BLD-MCNC: 11.8 G/DL (ref 13.3–17.7)
IMM GRANULOCYTES # BLD: 0 10E9/L (ref 0–0.4)
IMM GRANULOCYTES NFR BLD: 0.3 %
LYMPHOCYTES # BLD AUTO: 2 10E9/L (ref 0.8–5.3)
LYMPHOCYTES NFR BLD AUTO: 20.4 %
Lab: ABNORMAL
MCH RBC QN AUTO: 28.6 PG (ref 26.5–33)
MCHC RBC AUTO-ENTMCNC: 32 G/DL (ref 31.5–36.5)
MCV RBC AUTO: 90 FL (ref 78–100)
MONOCYTES # BLD AUTO: 1.4 10E9/L (ref 0–1.3)
MONOCYTES NFR BLD AUTO: 14.7 %
NEUTROPHILS # BLD AUTO: 5.9 10E9/L (ref 1.6–8.3)
NEUTROPHILS NFR BLD AUTO: 61 %
NRBC # BLD AUTO: 0 10*3/UL
NRBC BLD AUTO-RTO: 0 /100
PLATELET # BLD AUTO: 468 10E9/L (ref 150–450)
RBC # BLD AUTO: 4.12 10E12/L (ref 4.4–5.9)
SPECIMEN SOURCE: ABNORMAL
WBC # BLD AUTO: 9.7 10E9/L (ref 4–11)

## 2020-03-01 PROCEDURE — 84450 TRANSFERASE (AST) (SGOT): CPT | Performed by: INTERNAL MEDICINE

## 2020-03-01 PROCEDURE — 85652 RBC SED RATE AUTOMATED: CPT | Performed by: INTERNAL MEDICINE

## 2020-03-01 PROCEDURE — 25000128 H RX IP 250 OP 636: Performed by: INTERNAL MEDICINE

## 2020-03-01 PROCEDURE — 25000125 ZZHC RX 250: Performed by: INTERNAL MEDICINE

## 2020-03-01 PROCEDURE — 96374 THER/PROPH/DIAG INJ IV PUSH: CPT

## 2020-03-01 PROCEDURE — 82565 ASSAY OF CREATININE: CPT | Performed by: INTERNAL MEDICINE

## 2020-03-01 PROCEDURE — 85025 COMPLETE CBC W/AUTO DIFF WBC: CPT | Performed by: INTERNAL MEDICINE

## 2020-03-01 PROCEDURE — 86140 C-REACTIVE PROTEIN: CPT | Performed by: INTERNAL MEDICINE

## 2020-03-01 RX ORDER — CEFTRIAXONE SODIUM 2 G
2 VIAL (EA) INJECTION ONCE
Status: CANCELLED
Start: 2020-03-02

## 2020-03-01 RX ORDER — HEPARIN SODIUM,PORCINE 10 UNIT/ML
5 VIAL (ML) INTRAVENOUS
Status: CANCELLED | OUTPATIENT
Start: 2020-03-02

## 2020-03-01 RX ORDER — CEFTRIAXONE SODIUM 2 G
2 VIAL (EA) INJECTION ONCE
Status: COMPLETED | OUTPATIENT
Start: 2020-03-01 | End: 2020-03-01

## 2020-03-01 RX ORDER — HEPARIN SODIUM (PORCINE) LOCK FLUSH IV SOLN 100 UNIT/ML 100 UNIT/ML
5 SOLUTION INTRAVENOUS
Status: CANCELLED | OUTPATIENT
Start: 2020-03-02

## 2020-03-01 RX ADMIN — CEFTRIAXONE SODIUM 2 G: 2 INJECTION, POWDER, FOR SOLUTION INTRAMUSCULAR; INTRAVENOUS at 11:19

## 2020-03-01 ASSESSMENT — PAIN SCALES - GENERAL: PAINLEVEL: NO PAIN (0)

## 2020-03-01 NOTE — PROGRESS NOTES
Infusion Nursing Note:  Fausto Farr presents today for labs, rocephin.    Patient seen by provider today: No   present during visit today: Not Applicable.    Note: N/A.    Intravenous Access:  Labs drawn without difficulty.  PICC.    Treatment Conditions:  Not Applicable.      Post Infusion Assessment:  Patient tolerated infusion without incident.  Blood return noted pre and post infusion.  Site patent and intact, free from redness, edema or discomfort.  No evidence of extravasations.       Discharge Plan:   Patient declined prescription refills.  Discharge instructions reviewed with: Patient.  Patient and/or family verbalized understanding of discharge instructions and all questions answered.  AVS to patient via [employer].  Patient will return 3/2/2020 for next appointment.   Patient discharged in stable condition accompanied by: self.  Departure Mode: Ambulatory.    Nalini Vieyra RN

## 2020-03-02 ENCOUNTER — INFUSION THERAPY VISIT (OUTPATIENT)
Dept: INFUSION THERAPY | Facility: CLINIC | Age: 80
End: 2020-03-02
Attending: INTERNAL MEDICINE
Payer: MEDICARE

## 2020-03-02 VITALS
TEMPERATURE: 98.6 F | HEART RATE: 96 BPM | OXYGEN SATURATION: 98 % | RESPIRATION RATE: 16 BRPM | DIASTOLIC BLOOD PRESSURE: 76 MMHG | SYSTOLIC BLOOD PRESSURE: 138 MMHG

## 2020-03-02 DIAGNOSIS — M00.80 ARTHRITIS DUE TO OTHER BACTERIA, SEPTIC ARTHRITIS OF UNSPECIFIED LOCATION (H): Primary | ICD-10-CM

## 2020-03-02 PROCEDURE — 25000128 H RX IP 250 OP 636: Performed by: INTERNAL MEDICINE

## 2020-03-02 PROCEDURE — 96374 THER/PROPH/DIAG INJ IV PUSH: CPT

## 2020-03-02 PROCEDURE — 25000125 ZZHC RX 250: Performed by: INTERNAL MEDICINE

## 2020-03-02 RX ORDER — HEPARIN SODIUM (PORCINE) LOCK FLUSH IV SOLN 100 UNIT/ML 100 UNIT/ML
5 SOLUTION INTRAVENOUS
Status: CANCELLED | OUTPATIENT
Start: 2020-03-03

## 2020-03-02 RX ORDER — CEFTRIAXONE SODIUM 2 G
2 VIAL (EA) INJECTION ONCE
Status: COMPLETED | OUTPATIENT
Start: 2020-03-02 | End: 2020-03-02

## 2020-03-02 RX ORDER — CEFTRIAXONE SODIUM 2 G
2 VIAL (EA) INJECTION ONCE
Status: CANCELLED
Start: 2020-03-03

## 2020-03-02 RX ORDER — HEPARIN SODIUM,PORCINE 10 UNIT/ML
5 VIAL (ML) INTRAVENOUS
Status: CANCELLED | OUTPATIENT
Start: 2020-03-03

## 2020-03-02 RX ADMIN — CEFTRIAXONE SODIUM 2 G: 2 INJECTION, POWDER, FOR SOLUTION INTRAMUSCULAR; INTRAVENOUS at 13:45

## 2020-03-02 NOTE — PROGRESS NOTES
Infusion Nursing Note:  Fausto Farr presents today for Rocephin.    Patient seen by provider today: No   present during visit today: Not Applicable.    Note: N/A.    Intravenous Access:  PICC.    Treatment Conditions:  Not Applicable.      Post Infusion Assessment:  Patient tolerated infusion without incident.  Blood return noted pre and post infusion.  Site patent and intact, free from redness, edema or discomfort.  No evidence of extravasations.       Discharge Plan:   Discharge instructions reviewed with: Patient.  Patient and/or family verbalized understanding of discharge instructions and all questions answered.  Copy of AVS reviewed with patient and/or family.  Patient will return 3/3/2020 for next appointment.  Patient discharged in stable condition accompanied by: self and wife.  Departure Mode: Ambulatory.    Natalya Juárez RN

## 2020-03-03 ENCOUNTER — INFUSION THERAPY VISIT (OUTPATIENT)
Dept: INFUSION THERAPY | Facility: CLINIC | Age: 80
End: 2020-03-03
Attending: INTERNAL MEDICINE
Payer: MEDICARE

## 2020-03-03 VITALS
SYSTOLIC BLOOD PRESSURE: 135 MMHG | DIASTOLIC BLOOD PRESSURE: 65 MMHG | TEMPERATURE: 97.6 F | OXYGEN SATURATION: 98 % | RESPIRATION RATE: 16 BRPM | HEART RATE: 71 BPM

## 2020-03-03 DIAGNOSIS — M00.80 ARTHRITIS DUE TO OTHER BACTERIA, SEPTIC ARTHRITIS OF UNSPECIFIED LOCATION (H): Primary | ICD-10-CM

## 2020-03-03 PROCEDURE — 25000128 H RX IP 250 OP 636: Performed by: INTERNAL MEDICINE

## 2020-03-03 PROCEDURE — 96374 THER/PROPH/DIAG INJ IV PUSH: CPT

## 2020-03-03 PROCEDURE — 25000125 ZZHC RX 250: Performed by: INTERNAL MEDICINE

## 2020-03-03 RX ORDER — HEPARIN SODIUM,PORCINE 10 UNIT/ML
5 VIAL (ML) INTRAVENOUS
Status: CANCELLED | OUTPATIENT
Start: 2020-03-04

## 2020-03-03 RX ORDER — HEPARIN SODIUM (PORCINE) LOCK FLUSH IV SOLN 100 UNIT/ML 100 UNIT/ML
5 SOLUTION INTRAVENOUS
Status: CANCELLED | OUTPATIENT
Start: 2020-03-04

## 2020-03-03 RX ORDER — CEFTRIAXONE SODIUM 2 G
2 VIAL (EA) INJECTION ONCE
Status: COMPLETED | OUTPATIENT
Start: 2020-03-03 | End: 2020-03-03

## 2020-03-03 RX ORDER — CEFTRIAXONE SODIUM 2 G
2 VIAL (EA) INJECTION ONCE
Status: CANCELLED
Start: 2020-03-04

## 2020-03-03 RX ADMIN — CEFTRIAXONE SODIUM 2 G: 2 INJECTION, POWDER, FOR SOLUTION INTRAMUSCULAR; INTRAVENOUS at 13:56

## 2020-03-03 ASSESSMENT — PAIN SCALES - GENERAL: PAINLEVEL: MILD PAIN (2)

## 2020-03-03 NOTE — PROGRESS NOTES
Infusion Nursing Note:  Fausto Farr presents today for Rocephin.    Patient seen by provider today: No   present during visit today: Not Applicable.    Note: Denies rash from Rocephin.  Is having around 3 loose stools/day.  Taking Imodium.  Is not wearing R knee brace today.      Intravenous Access:  PICC.    Treatment Conditions:  Not Applicable.      Post Infusion Assessment:  Patient tolerated infusion without incident.  Blood return noted pre and post infusion.  Site patent and intact, free from redness, edema or discomfort.  No evidence of extravasations.       Discharge Plan:   Discharge instructions reviewed with: Patient.  Patient discharged in stable condition accompanied by: self.  Departure Mode: Ambulatory.  Next infusion scheduled for 3/4/2020.    RICKY DE LA TORRE RN

## 2020-03-04 ENCOUNTER — HOSPITAL ENCOUNTER (OUTPATIENT)
Facility: CLINIC | Age: 80
Setting detail: SPECIMEN
Discharge: HOME | End: 2020-03-04
Attending: INTERNAL MEDICINE | Admitting: INTERNAL MEDICINE
Payer: MEDICARE

## 2020-03-04 ENCOUNTER — ALLIED HEALTH/NURSE VISIT (OUTPATIENT)
Dept: INFUSION THERAPY | Facility: CLINIC | Age: 80
End: 2020-03-04
Attending: INTERNAL MEDICINE
Payer: MEDICARE

## 2020-03-04 ENCOUNTER — TELEPHONE (OUTPATIENT)
Dept: PEDIATRICS | Facility: CLINIC | Age: 80
End: 2020-03-04

## 2020-03-04 VITALS
TEMPERATURE: 97.3 F | RESPIRATION RATE: 16 BRPM | HEART RATE: 78 BPM | DIASTOLIC BLOOD PRESSURE: 76 MMHG | SYSTOLIC BLOOD PRESSURE: 131 MMHG | OXYGEN SATURATION: 98 %

## 2020-03-04 DIAGNOSIS — Z79.01 LONG TERM CURRENT USE OF ANTICOAGULANT THERAPY: ICD-10-CM

## 2020-03-04 DIAGNOSIS — I48.91 AF (ATRIAL FIBRILLATION) (H): ICD-10-CM

## 2020-03-04 DIAGNOSIS — M00.80 ARTHRITIS DUE TO OTHER BACTERIA, SEPTIC ARTHRITIS OF UNSPECIFIED LOCATION (H): Primary | ICD-10-CM

## 2020-03-04 DIAGNOSIS — Z95.2 S/P MITRAL VALVE REPLACEMENT: ICD-10-CM

## 2020-03-04 LAB — INR PPP: 3.19 (ref 0.86–1.14)

## 2020-03-04 PROCEDURE — 25000128 H RX IP 250 OP 636: Performed by: INTERNAL MEDICINE

## 2020-03-04 PROCEDURE — 25000125 ZZHC RX 250: Performed by: INTERNAL MEDICINE

## 2020-03-04 PROCEDURE — 85610 PROTHROMBIN TIME: CPT | Performed by: INTERNAL MEDICINE

## 2020-03-04 PROCEDURE — 96374 THER/PROPH/DIAG INJ IV PUSH: CPT

## 2020-03-04 RX ORDER — HEPARIN SODIUM (PORCINE) LOCK FLUSH IV SOLN 100 UNIT/ML 100 UNIT/ML
5 SOLUTION INTRAVENOUS
Status: CANCELLED | OUTPATIENT
Start: 2020-03-05

## 2020-03-04 RX ORDER — HEPARIN SODIUM,PORCINE 10 UNIT/ML
5 VIAL (ML) INTRAVENOUS
Status: CANCELLED | OUTPATIENT
Start: 2020-03-05

## 2020-03-04 RX ORDER — CEFTRIAXONE SODIUM 2 G
2 VIAL (EA) INJECTION ONCE
Status: COMPLETED | OUTPATIENT
Start: 2020-03-04 | End: 2020-03-04

## 2020-03-04 RX ORDER — CEFTRIAXONE SODIUM 2 G
2 VIAL (EA) INJECTION ONCE
Status: CANCELLED
Start: 2020-03-05

## 2020-03-04 RX ADMIN — CEFTRIAXONE SODIUM 2 G: 2 INJECTION, POWDER, FOR SOLUTION INTRAMUSCULAR; INTRAVENOUS at 14:00

## 2020-03-04 NOTE — TELEPHONE ENCOUNTER
ANTICOAGULATION MANAGEMENT     Patient Name:  Fausto Farr  Date:  3/4/2020    ASSESSMENT /SUBJECTIVE:      Today's INR result of 3.19 is therapeutic. Goal INR of 2.5-3.5      Warfarin dose taken: Warfarin taken as previously instructed    Diet: No new diet changes affecting INR    Medication changes/ interactions: No new medications/supplements affecting INR    Previous INR: Subtherapeutic     S/S of bleeding or thromboembolism: No    New injury or illness:  No    Upcoming surgery, procedure or cardioversion:  No    Additional findings: Patient had wound debridement and is on PICC line antibiotics (Rocephin) for the next  weeks, denies any more diarrhea as he is taking something like imodium (Stool hardener).    PLAN:    Spoke with Fausto regarding INR result and instructed:     Warfarin Dosing Instructions: Continue your current warfarin dose    Instructed patient to follow up no later than: 1 week  Lab visit scheduled    Education provided: Yes: Signs and symptoms to watch for and if noted to be seen right away      Ralph,  verbalizes understanding and agrees to warfarin dosing plan.    Instructed to call the Anticoagulation Clinic for any changes, questions or concerns. (#[phone])        OBJECTIVE:  INR   Date Value Ref Range Status   03/04/2020 3.19 (H) 0.86 - 1.14 Final             Anticoagulation Summary  As of 3/4/2020    INR goal:   2.5-3.5   TTR:   73.0 % (10.9 mo)   INR used for dosing:   3.19 (3/4/2020)   Warfarin maintenance plan:   5 mg (5 mg x 1) every Mon, Fri; 7.5 mg (5 mg x 1.5) all other days   Full warfarin instructions:   5 mg every Mon, Fri; 7.5 mg all other days   Weekly warfarin total:   47.5 mg   No change documented:   Aline Beal, RN   Plan last modified:   Cecy Nails, RN (2/26/2020)   Next INR check:   3/11/2020   Priority:   High   Target end date:   Indefinite    Indications    AF (atrial fibrillation) (H) [I48.91]  S/P mitral valve replacement [Z95.2]  Long term  current use of anticoagulant therapy [Z79.01]             Anticoagulation Episode Summary     INR check location:       Preferred lab:   EXTERNAL LAB    Send INR reminders to:   SHANNA PERRY    Comments:   5mg tabs - andrade dose // transfer from [facility] // [facility] // [place]ENDAR // right knee replaced 7/22/19 //Home care      Anticoagulation Care Providers     Provider Role Specialty Phone number    Jodi Flower Mai, MD  Internal Medicine [phone]

## 2020-03-04 NOTE — PROGRESS NOTES
Infusion Nursing Note:  Fausto Farr presents today for Rocephin.    Patient seen by provider today: No   present during visit today: Not Applicable.    Note: N/A.    Intravenous Access:  PICC.    Treatment Conditions:  Not Applicable.      Post Infusion Assessment:  Patient tolerated infusion without incident.  Site patent and intact, free from redness, edema or discomfort.  No evidence of extravasations.       Discharge Plan:   AVS to patient via MYCHART.  Patient will return 3/5 for next appointment.   Patient discharged in stable condition accompanied by: self.  Departure Mode: Ambulatory.    Sury Gould RN

## 2020-03-05 ENCOUNTER — ALLIED HEALTH/NURSE VISIT (OUTPATIENT)
Dept: INFUSION THERAPY | Facility: CLINIC | Age: 80
End: 2020-03-05
Attending: INTERNAL MEDICINE
Payer: MEDICARE

## 2020-03-05 VITALS
HEART RATE: 90 BPM | TEMPERATURE: 97 F | SYSTOLIC BLOOD PRESSURE: 143 MMHG | OXYGEN SATURATION: 98 % | DIASTOLIC BLOOD PRESSURE: 80 MMHG | RESPIRATION RATE: 16 BRPM

## 2020-03-05 DIAGNOSIS — M00.80 ARTHRITIS DUE TO OTHER BACTERIA, SEPTIC ARTHRITIS OF UNSPECIFIED LOCATION (H): Primary | ICD-10-CM

## 2020-03-05 PROCEDURE — 25000128 H RX IP 250 OP 636: Performed by: INTERNAL MEDICINE

## 2020-03-05 PROCEDURE — 96374 THER/PROPH/DIAG INJ IV PUSH: CPT

## 2020-03-05 PROCEDURE — 25000125 ZZHC RX 250: Performed by: INTERNAL MEDICINE

## 2020-03-05 RX ORDER — CEFTRIAXONE SODIUM 2 G
2 VIAL (EA) INJECTION ONCE
Status: COMPLETED | OUTPATIENT
Start: 2020-03-05 | End: 2020-03-05

## 2020-03-05 RX ORDER — CEFTRIAXONE SODIUM 2 G
2 VIAL (EA) INJECTION ONCE
Status: CANCELLED
Start: 2020-03-06

## 2020-03-05 RX ORDER — HEPARIN SODIUM,PORCINE 10 UNIT/ML
5 VIAL (ML) INTRAVENOUS
Status: CANCELLED | OUTPATIENT
Start: 2020-03-06

## 2020-03-05 RX ORDER — HEPARIN SODIUM (PORCINE) LOCK FLUSH IV SOLN 100 UNIT/ML 100 UNIT/ML
5 SOLUTION INTRAVENOUS
Status: CANCELLED | OUTPATIENT
Start: 2020-03-06

## 2020-03-05 RX ADMIN — CEFTRIAXONE SODIUM 2 G: 2 INJECTION, POWDER, FOR SOLUTION INTRAMUSCULAR; INTRAVENOUS at 13:22

## 2020-03-05 NOTE — PROGRESS NOTES
Infusion Nursing Note:  Fausto Farr presents today for Rocephin and PICC dressing change.    Patient seen by provider today: No   present during visit today: Not Applicable.    Note: N/A.    Intravenous Access:  PICC.  Dressing changed today.    Treatment Conditions:  Not Applicable.  Labs due Sunday. Had INR drawn yesterday, reports he only needs it weekly.     Post Infusion Assessment:  Patient tolerated infusion without incident.  Blood return noted pre and post infusion.  Site patent and intact, free from redness, edema or discomfort.  No evidence of extravasations.  PICC left intact for infusion tomorrow.       Discharge Plan:   Patient will return 3/6 for next appointment.  Patient discharged in stable condition accompanied by: self.  Departure Mode: Ambulatory with cane.    Maura Frances RN

## 2020-03-06 ENCOUNTER — ALLIED HEALTH/NURSE VISIT (OUTPATIENT)
Dept: INFUSION THERAPY | Facility: CLINIC | Age: 80
End: 2020-03-06
Attending: INTERNAL MEDICINE
Payer: MEDICARE

## 2020-03-06 VITALS
TEMPERATURE: 97.4 F | SYSTOLIC BLOOD PRESSURE: 120 MMHG | HEART RATE: 78 BPM | OXYGEN SATURATION: 99 % | DIASTOLIC BLOOD PRESSURE: 71 MMHG

## 2020-03-06 DIAGNOSIS — M00.80 ARTHRITIS DUE TO OTHER BACTERIA, SEPTIC ARTHRITIS OF UNSPECIFIED LOCATION (H): Primary | ICD-10-CM

## 2020-03-06 PROCEDURE — 25000125 ZZHC RX 250: Performed by: INTERNAL MEDICINE

## 2020-03-06 PROCEDURE — 25000128 H RX IP 250 OP 636: Performed by: INTERNAL MEDICINE

## 2020-03-06 PROCEDURE — 96374 THER/PROPH/DIAG INJ IV PUSH: CPT

## 2020-03-06 RX ORDER — CEFTRIAXONE SODIUM 2 G
2 VIAL (EA) INJECTION ONCE
Status: CANCELLED
Start: 2020-03-07

## 2020-03-06 RX ORDER — HEPARIN SODIUM (PORCINE) LOCK FLUSH IV SOLN 100 UNIT/ML 100 UNIT/ML
5 SOLUTION INTRAVENOUS
Status: CANCELLED | OUTPATIENT
Start: 2020-03-07

## 2020-03-06 RX ORDER — HEPARIN SODIUM,PORCINE 10 UNIT/ML
5 VIAL (ML) INTRAVENOUS
Status: CANCELLED | OUTPATIENT
Start: 2020-03-07

## 2020-03-06 RX ORDER — CEFTRIAXONE SODIUM 2 G
2 VIAL (EA) INJECTION ONCE
Status: COMPLETED | OUTPATIENT
Start: 2020-03-06 | End: 2020-03-06

## 2020-03-06 RX ADMIN — CEFTRIAXONE SODIUM 2 G: 2 INJECTION, POWDER, FOR SOLUTION INTRAMUSCULAR; INTRAVENOUS at 12:27

## 2020-03-06 NOTE — PROGRESS NOTES
Nursing Note:  Fausto Farr presents today for Rocephin.    Patient seen by provider today: No   present during visit today: Not Applicable.    Note: N/A.    Intravenous Access:  PICC.    Discharge Plan:   Patient was discharged to home.  Will return to [facility] 3/7/20 and 3/8/20 for Rocephin injections over the weekend     Maura Hair RN

## 2020-03-07 ENCOUNTER — INFUSION THERAPY VISIT (OUTPATIENT)
Dept: INFUSION THERAPY | Facility: CLINIC | Age: 80
End: 2020-03-07
Attending: INTERNAL MEDICINE
Payer: MEDICARE

## 2020-03-07 VITALS
SYSTOLIC BLOOD PRESSURE: 153 MMHG | RESPIRATION RATE: 20 BRPM | HEART RATE: 82 BPM | TEMPERATURE: 98.1 F | OXYGEN SATURATION: 96 % | DIASTOLIC BLOOD PRESSURE: 80 MMHG

## 2020-03-07 DIAGNOSIS — M00.80 ARTHRITIS DUE TO OTHER BACTERIA, SEPTIC ARTHRITIS OF UNSPECIFIED LOCATION (H): Primary | ICD-10-CM

## 2020-03-07 PROCEDURE — 25000125 ZZHC RX 250: Performed by: INTERNAL MEDICINE

## 2020-03-07 PROCEDURE — 96374 THER/PROPH/DIAG INJ IV PUSH: CPT

## 2020-03-07 PROCEDURE — 25000128 H RX IP 250 OP 636: Performed by: INTERNAL MEDICINE

## 2020-03-07 RX ORDER — CEFTRIAXONE SODIUM 2 G
2 VIAL (EA) INJECTION ONCE
Status: CANCELLED
Start: 2020-03-08

## 2020-03-07 RX ORDER — HEPARIN SODIUM,PORCINE 10 UNIT/ML
5 VIAL (ML) INTRAVENOUS
Status: CANCELLED | OUTPATIENT
Start: 2020-03-08

## 2020-03-07 RX ORDER — HEPARIN SODIUM (PORCINE) LOCK FLUSH IV SOLN 100 UNIT/ML 100 UNIT/ML
5 SOLUTION INTRAVENOUS
Status: CANCELLED | OUTPATIENT
Start: 2020-03-08

## 2020-03-07 RX ORDER — CEFTRIAXONE SODIUM 2 G
2 VIAL (EA) INJECTION ONCE
Status: COMPLETED | OUTPATIENT
Start: 2020-03-07 | End: 2020-03-07

## 2020-03-07 RX ADMIN — CEFTRIAXONE SODIUM 2 G: 2 INJECTION, POWDER, FOR SOLUTION INTRAMUSCULAR; INTRAVENOUS at 10:57

## 2020-03-07 ASSESSMENT — PAIN SCALES - GENERAL: PAINLEVEL: MILD PAIN (2)

## 2020-03-07 NOTE — PROGRESS NOTES
Infusion Nursing Note:  Fausto Farr presents today for IVF.    Patient seen by provider today: No   present during visit today: Not Applicable.    Note: N/A.    Intravenous Access:  PICC.    Treatment Conditions:  Not Applicable.      Post Infusion Assessment:  Patient tolerated infusion without incident.  Blood return noted pre and post infusion.  Site patent and intact, free from redness, edema or discomfort.  No evidence of extravasations.  Access discontinued per protocol.       Discharge Plan:   Discharge instructions reviewed with: Patient.  Patient and/or family verbalized understanding of discharge instructions and all questions answered.  Copy of AVS reviewed with patient and/or family.  Patient will return 3/8/20 for next appointment.  Patient discharged in stable condition accompanied by: self.  Departure Mode: Ambulatory.    Sandra Mobley RN

## 2020-03-08 ENCOUNTER — INFUSION THERAPY VISIT (OUTPATIENT)
Dept: INFUSION THERAPY | Facility: CLINIC | Age: 80
End: 2020-03-08
Attending: INTERNAL MEDICINE
Payer: MEDICARE

## 2020-03-08 VITALS — DIASTOLIC BLOOD PRESSURE: 75 MMHG | TEMPERATURE: 97 F | SYSTOLIC BLOOD PRESSURE: 144 MMHG | HEART RATE: 78 BPM

## 2020-03-08 DIAGNOSIS — M00.80 ARTHRITIS DUE TO OTHER BACTERIA, SEPTIC ARTHRITIS OF UNSPECIFIED LOCATION (H): Primary | ICD-10-CM

## 2020-03-08 PROCEDURE — 96374 THER/PROPH/DIAG INJ IV PUSH: CPT

## 2020-03-08 PROCEDURE — 25000125 ZZHC RX 250: Performed by: INTERNAL MEDICINE

## 2020-03-08 PROCEDURE — 25000128 H RX IP 250 OP 636: Performed by: INTERNAL MEDICINE

## 2020-03-08 RX ORDER — HEPARIN SODIUM,PORCINE 10 UNIT/ML
5 VIAL (ML) INTRAVENOUS
Status: CANCELLED | OUTPATIENT
Start: 2020-03-09

## 2020-03-08 RX ORDER — CEFTRIAXONE SODIUM 2 G
2 VIAL (EA) INJECTION ONCE
Status: COMPLETED | OUTPATIENT
Start: 2020-03-08 | End: 2020-03-08

## 2020-03-08 RX ORDER — CEFTRIAXONE SODIUM 2 G
2 VIAL (EA) INJECTION ONCE
Status: CANCELLED
Start: 2020-03-09

## 2020-03-08 RX ORDER — HEPARIN SODIUM (PORCINE) LOCK FLUSH IV SOLN 100 UNIT/ML 100 UNIT/ML
5 SOLUTION INTRAVENOUS
Status: CANCELLED | OUTPATIENT
Start: 2020-03-09

## 2020-03-08 RX ADMIN — CEFTRIAXONE SODIUM 2 G: 2 INJECTION, POWDER, FOR SOLUTION INTRAMUSCULAR; INTRAVENOUS at 11:11

## 2020-03-08 ASSESSMENT — PAIN SCALES - GENERAL: PAINLEVEL: MILD PAIN (2)

## 2020-03-08 NOTE — PROGRESS NOTES
Infusion Nursing Note:  Fausto Farr presents today for rocephin.    Patient seen by provider today: No   present during visit today: Not Applicable.    Note: N/A.    Intravenous Access:  PICC.    Treatment Conditions:  Not Applicable.      Post Infusion Assessment:  Patient tolerated infusion without incident.  Blood return noted pre and post infusion.  Site patent and intact, free from redness, edema or discomfort.  No evidence of extravasations.       Discharge Plan:   Discharge instructions reviewed with: Patient.  Patient and/or family verbalized understanding of discharge instructions and all questions answered.  Patient discharged in stable condition accompanied by: self.  Departure Mode: Ambulatory.    Hallie Carlson RN

## 2020-03-09 ENCOUNTER — HOSPITAL ENCOUNTER (OUTPATIENT)
Facility: CLINIC | Age: 80
Setting detail: SPECIMEN
Discharge: HOME | End: 2020-03-09
Attending: INTERNAL MEDICINE | Admitting: INTERNAL MEDICINE
Payer: MEDICARE

## 2020-03-09 ENCOUNTER — TRANSFERRED RECORDS (OUTPATIENT)
Dept: HEALTH INFORMATION MANAGEMENT | Facility: CLINIC | Age: 80
End: 2020-03-09

## 2020-03-09 ENCOUNTER — ALLIED HEALTH/NURSE VISIT (OUTPATIENT)
Dept: INFUSION THERAPY | Facility: CLINIC | Age: 80
End: 2020-03-09
Attending: INTERNAL MEDICINE
Payer: MEDICARE

## 2020-03-09 VITALS
RESPIRATION RATE: 16 BRPM | TEMPERATURE: 98.1 F | SYSTOLIC BLOOD PRESSURE: 117 MMHG | OXYGEN SATURATION: 95 % | HEART RATE: 82 BPM | DIASTOLIC BLOOD PRESSURE: 57 MMHG

## 2020-03-09 DIAGNOSIS — M00.80 ARTHRITIS DUE TO OTHER BACTERIA, SEPTIC ARTHRITIS OF UNSPECIFIED LOCATION (H): Primary | ICD-10-CM

## 2020-03-09 LAB
AST SERPL W P-5'-P-CCNC: 23 U/L (ref 0–45)
BASOPHILS # BLD AUTO: 0.1 10E9/L (ref 0–0.2)
BASOPHILS NFR BLD AUTO: 0.6 %
CREAT SERPL-MCNC: 0.93 MG/DL (ref 0.66–1.25)
CRP SERPL-MCNC: 3.4 MG/L (ref 0–8)
DIFFERENTIAL METHOD BLD: ABNORMAL
EOSINOPHIL # BLD AUTO: 0.1 10E9/L (ref 0–0.7)
EOSINOPHIL NFR BLD AUTO: 0.9 %
ERYTHROCYTE [DISTWIDTH] IN BLOOD BY AUTOMATED COUNT: 14.2 % (ref 10–15)
ERYTHROCYTE [SEDIMENTATION RATE] IN BLOOD BY WESTERGREN METHOD: 38 MM/H (ref 0–20)
GFR SERPL CREATININE-BSD FRML MDRD: 78 ML/MIN/{1.73_M2}
HCT VFR BLD AUTO: 37.4 % (ref 40–53)
HGB BLD-MCNC: 11.6 G/DL (ref 13.3–17.7)
IMM GRANULOCYTES # BLD: 0 10E9/L (ref 0–0.4)
IMM GRANULOCYTES NFR BLD: 0.4 %
LYMPHOCYTES # BLD AUTO: 1.6 10E9/L (ref 0.8–5.3)
LYMPHOCYTES NFR BLD AUTO: 15 %
MCH RBC QN AUTO: 28.5 PG (ref 26.5–33)
MCHC RBC AUTO-ENTMCNC: 31 G/DL (ref 31.5–36.5)
MCV RBC AUTO: 92 FL (ref 78–100)
MONOCYTES # BLD AUTO: 1.3 10E9/L (ref 0–1.3)
MONOCYTES NFR BLD AUTO: 11.8 %
NEUTROPHILS # BLD AUTO: 7.7 10E9/L (ref 1.6–8.3)
NEUTROPHILS NFR BLD AUTO: 71.3 %
NRBC # BLD AUTO: 0 10*3/UL
NRBC BLD AUTO-RTO: 0 /100
PLATELET # BLD AUTO: 365 10E9/L (ref 150–450)
RBC # BLD AUTO: 4.07 10E12/L (ref 4.4–5.9)
WBC # BLD AUTO: 10.8 10E9/L (ref 4–11)

## 2020-03-09 PROCEDURE — 85652 RBC SED RATE AUTOMATED: CPT | Performed by: INTERNAL MEDICINE

## 2020-03-09 PROCEDURE — 96374 THER/PROPH/DIAG INJ IV PUSH: CPT

## 2020-03-09 PROCEDURE — 85025 COMPLETE CBC W/AUTO DIFF WBC: CPT | Performed by: INTERNAL MEDICINE

## 2020-03-09 PROCEDURE — 84450 TRANSFERASE (AST) (SGOT): CPT | Performed by: INTERNAL MEDICINE

## 2020-03-09 PROCEDURE — 25000125 ZZHC RX 250: Performed by: INTERNAL MEDICINE

## 2020-03-09 PROCEDURE — 25000128 H RX IP 250 OP 636: Performed by: INTERNAL MEDICINE

## 2020-03-09 PROCEDURE — 82565 ASSAY OF CREATININE: CPT | Performed by: INTERNAL MEDICINE

## 2020-03-09 PROCEDURE — 86140 C-REACTIVE PROTEIN: CPT | Performed by: INTERNAL MEDICINE

## 2020-03-09 RX ORDER — CEFTRIAXONE SODIUM 2 G
2 VIAL (EA) INJECTION ONCE
Status: COMPLETED | OUTPATIENT
Start: 2020-03-09 | End: 2020-03-09

## 2020-03-09 RX ORDER — CEFTRIAXONE SODIUM 2 G
2 VIAL (EA) INJECTION ONCE
Status: CANCELLED
Start: 2020-03-10

## 2020-03-09 RX ORDER — HEPARIN SODIUM (PORCINE) LOCK FLUSH IV SOLN 100 UNIT/ML 100 UNIT/ML
5 SOLUTION INTRAVENOUS
Status: CANCELLED | OUTPATIENT
Start: 2020-03-10

## 2020-03-09 RX ORDER — HEPARIN SODIUM,PORCINE 10 UNIT/ML
5 VIAL (ML) INTRAVENOUS
Status: CANCELLED | OUTPATIENT
Start: 2020-03-10

## 2020-03-09 RX ADMIN — CEFTRIAXONE SODIUM 2 G: 2 INJECTION, POWDER, FOR SOLUTION INTRAMUSCULAR; INTRAVENOUS at 12:21

## 2020-03-09 ASSESSMENT — PAIN SCALES - GENERAL: PAINLEVEL: MILD PAIN (2)

## 2020-03-09 NOTE — PROGRESS NOTES
Infusion Nursing Note:  Fausto Farr presents today for Rocephin, Labs.    Patient seen by provider today: No   present during visit today: Not Applicable.    Note: F/Up with [facility] this afternoon. Will likely have staples taken out, and will find out about starting PT.    Intravenous Access:   PICC- dressing to be changed Thursday.  Labs drawn without difficulty.    Treatment Conditions:  Labs pending; results to be faxed to [facility].    Post Infusion Assessment:  Patient tolerated infusion without incident.  Blood return noted pre and post infusion.  Site patent and intact, free from redness, edema or discomfort.  No evidence of extravasations.     Discharge Plan:   Discharge instructions reviewed with: Patient.  Patient and/or family verbalized understanding of discharge instructions and all questions answered.  AVS to patient via [employer].  Patient will return 3/10 for next appointment.   Patient discharged in stable condition accompanied by: self.  Departure Mode: Ambulatory with cane.    Lina Jacobs RN

## 2020-03-10 ENCOUNTER — HOSPITAL ENCOUNTER (OUTPATIENT)
Facility: CLINIC | Age: 80
Setting detail: SPECIMEN
End: 2020-03-10
Attending: INTERNAL MEDICINE
Payer: MEDICARE

## 2020-03-10 ENCOUNTER — ALLIED HEALTH/NURSE VISIT (OUTPATIENT)
Dept: INFUSION THERAPY | Facility: CLINIC | Age: 80
End: 2020-03-10
Attending: INTERNAL MEDICINE
Payer: MEDICARE

## 2020-03-10 VITALS
SYSTOLIC BLOOD PRESSURE: 117 MMHG | HEART RATE: 80 BPM | DIASTOLIC BLOOD PRESSURE: 60 MMHG | RESPIRATION RATE: 16 BRPM | TEMPERATURE: 97 F

## 2020-03-10 DIAGNOSIS — M00.80 ARTHRITIS DUE TO OTHER BACTERIA, SEPTIC ARTHRITIS OF UNSPECIFIED LOCATION (H): Primary | ICD-10-CM

## 2020-03-10 PROCEDURE — 96374 THER/PROPH/DIAG INJ IV PUSH: CPT

## 2020-03-10 PROCEDURE — 25000125 ZZHC RX 250: Performed by: INTERNAL MEDICINE

## 2020-03-10 PROCEDURE — 25000128 H RX IP 250 OP 636: Performed by: INTERNAL MEDICINE

## 2020-03-10 RX ORDER — CEFTRIAXONE SODIUM 2 G
2 VIAL (EA) INJECTION ONCE
Status: CANCELLED
Start: 2020-03-11

## 2020-03-10 RX ORDER — HEPARIN SODIUM (PORCINE) LOCK FLUSH IV SOLN 100 UNIT/ML 100 UNIT/ML
5 SOLUTION INTRAVENOUS
Status: CANCELLED | OUTPATIENT
Start: 2020-03-11

## 2020-03-10 RX ORDER — CEFTRIAXONE SODIUM 2 G
2 VIAL (EA) INJECTION ONCE
Status: COMPLETED | OUTPATIENT
Start: 2020-03-10 | End: 2020-03-10

## 2020-03-10 RX ORDER — HEPARIN SODIUM,PORCINE 10 UNIT/ML
5 VIAL (ML) INTRAVENOUS
Status: CANCELLED | OUTPATIENT
Start: 2020-03-11

## 2020-03-10 RX ADMIN — CEFTRIAXONE SODIUM 2 G: 2 INJECTION, POWDER, FOR SOLUTION INTRAMUSCULAR; INTRAVENOUS at 12:21

## 2020-03-10 NOTE — PROGRESS NOTES
Infusion Nursing Note:  Fausto Farr presents today for rocephin.    Patient seen by provider today: No   present during visit today: Not Applicable.    Note: N/A.    Intravenous Access:  PICC.    Treatment Conditions:  Not Applicable.      Post Infusion Assessment:  Patient tolerated infusion without incident.  Site patent and intact, free from redness, edema or discomfort.  No evidence of extravasations.       Discharge Plan:   Patient declined prescription refills.  Discharge instructions reviewed with: Patient.  Patient and/or family verbalized understanding of discharge instructions and all questions answered.  Copy of AVS reviewed with patient and/or family.  Patient will return 3/11/2020 for next appointment.  Patient discharged in stable condition accompanied by: self.  Departure Mode: Ambulatory.    Angelika Paniagua RN

## 2020-03-11 ENCOUNTER — ALLIED HEALTH/NURSE VISIT (OUTPATIENT)
Dept: INFUSION THERAPY | Facility: CLINIC | Age: 80
End: 2020-03-11
Attending: INTERNAL MEDICINE
Payer: MEDICARE

## 2020-03-11 ENCOUNTER — TELEPHONE (OUTPATIENT)
Dept: PEDIATRICS | Facility: CLINIC | Age: 80
End: 2020-03-11

## 2020-03-11 ENCOUNTER — HOSPITAL ENCOUNTER (OUTPATIENT)
Facility: CLINIC | Age: 80
Setting detail: SPECIMEN
Discharge: HOME | End: 2020-03-11
Attending: INTERNAL MEDICINE | Admitting: INTERNAL MEDICINE
Payer: MEDICARE

## 2020-03-11 VITALS
HEART RATE: 72 BPM | RESPIRATION RATE: 16 BRPM | SYSTOLIC BLOOD PRESSURE: 113 MMHG | OXYGEN SATURATION: 98 % | DIASTOLIC BLOOD PRESSURE: 79 MMHG | TEMPERATURE: 98 F

## 2020-03-11 DIAGNOSIS — Z95.2 S/P MITRAL VALVE REPLACEMENT: ICD-10-CM

## 2020-03-11 DIAGNOSIS — I48.91 AF (ATRIAL FIBRILLATION) (H): ICD-10-CM

## 2020-03-11 DIAGNOSIS — Z79.01 LONG TERM CURRENT USE OF ANTICOAGULANT THERAPY: ICD-10-CM

## 2020-03-11 DIAGNOSIS — M00.80 ARTHRITIS DUE TO OTHER BACTERIA, SEPTIC ARTHRITIS OF UNSPECIFIED LOCATION (H): Primary | ICD-10-CM

## 2020-03-11 LAB — INR PPP: 2.94 (ref 0.86–1.14)

## 2020-03-11 PROCEDURE — 25000128 H RX IP 250 OP 636: Performed by: INTERNAL MEDICINE

## 2020-03-11 PROCEDURE — 85610 PROTHROMBIN TIME: CPT | Performed by: INTERNAL MEDICINE

## 2020-03-11 PROCEDURE — 96374 THER/PROPH/DIAG INJ IV PUSH: CPT

## 2020-03-11 PROCEDURE — 25000125 ZZHC RX 250: Performed by: INTERNAL MEDICINE

## 2020-03-11 RX ORDER — CEFTRIAXONE SODIUM 2 G
2 VIAL (EA) INJECTION ONCE
Status: COMPLETED | OUTPATIENT
Start: 2020-03-11 | End: 2020-03-11

## 2020-03-11 RX ORDER — CEFTRIAXONE SODIUM 2 G
2 VIAL (EA) INJECTION ONCE
Status: CANCELLED
Start: 2020-03-12

## 2020-03-11 RX ORDER — HEPARIN SODIUM,PORCINE 10 UNIT/ML
5 VIAL (ML) INTRAVENOUS
Status: CANCELLED | OUTPATIENT
Start: 2020-03-12

## 2020-03-11 RX ORDER — HEPARIN SODIUM (PORCINE) LOCK FLUSH IV SOLN 100 UNIT/ML 100 UNIT/ML
5 SOLUTION INTRAVENOUS
Status: CANCELLED | OUTPATIENT
Start: 2020-03-12

## 2020-03-11 RX ADMIN — CEFTRIAXONE SODIUM 2 G: 2 INJECTION, POWDER, FOR SOLUTION INTRAMUSCULAR; INTRAVENOUS at 12:14

## 2020-03-11 NOTE — TELEPHONE ENCOUNTER
Anticoagulation Management    Unable to reach Ralph today.    Today's INR result of 2.94 is therapeutic (goal INR of 2.5-3.5).  Result received from: Clinic Lab    Follow up required to confirm warfarin dose taken and assess for changes    Left message to call back     Patient is on IV rocephin with picc line due to wound.      Anticoagulation clinic to follow up    Nemo Dobson RN

## 2020-03-11 NOTE — PROGRESS NOTES
Infusion Nursing Note:  Fausto Farr presents today for Rocephin.    Patient seen by provider today: No   present during visit today: Not Applicable.    Note: Patient said he had his staples removed from knee incision and had some sero/sang oozing from incision. No further drainage noted by patient today. He notified his provider.     Intravenous Access:  Labs drawn without difficulty.  PICC.    Treatment Conditions:  INR drawn.      Post Infusion Assessment:  Patient tolerated infusion without incident.  Blood return noted pre and post infusion.  Site patent and intact, free from redness, edema or discomfort.  No evidence of extravasations.       Discharge Plan:   Patient will return 3/12 for next appointment.  Patient discharged in stable condition accompanied by: self.  Departure Mode: Ambulatory with cane.    Maura Frances RN

## 2020-03-11 NOTE — TELEPHONE ENCOUNTER
ANTICOAGULATION MANAGEMENT     Patient Name:  Fausto Farr  Date:  3/11/2020    ASSESSMENT /SUBJECTIVE:      Today's INR result of 2.94 is therapeutic. Goal INR of 2.5-3.5      Warfarin dose taken: Warfarin taken as previously instructed    Diet: No new diet changes affecting INR    Medication changes/ interactions: No new medications/supplements affecting INR Remains on Rocephin via PICC line for wound; will remain on Rocephin for at least 2 more weeks    Previous INR: Therapeutic     S/S of bleeding or thromboembolism: No    New injury or illness:  No    Upcoming surgery, procedure or cardioversion:  No    Additional findings: None      PLAN:    Spoke with Fausto regarding INR result and instructed:     Warfarin Dosing Instructions: Continue your current warfarin dose of 5mg Mon/Fri; 7.5mg all other days    Instructed patient to follow up no later than: 1 week  Patient offered & declined to schedule next visit-will have INR drawn with next Rocephin infusion visit 3/18- has standing order for INR    Education provided: Yes: Significance of INR, importance of frequent monitoring of INR due to Rocephin    Ralph verbalizes understanding and agrees to warfarin dosing plan.    Instructed to call the Anticoagulation Clinic for any changes, questions or concerns. (#[phone])        OBJECTIVE:  INR   Date Value Ref Range Status   2020 2.94 (H) 0.86 - 1.14 Final             Anticoagulation Summary  As of 3/11/2020    INR goal:   2.5-3.5   TTR:   75.1 % (10.9 mo)   INR used for dosin.94 (3/11/2020)   Warfarin maintenance plan:   5 mg (5 mg x 1) every Mon, Fri; 7.5 mg (5 mg x 1.5) all other days   Full warfarin instructions:   5 mg every Mon, Fri; 7.5 mg all other days   Weekly warfarin total:   47.5 mg   Plan last modified:   Cecy Nails RN (2020)   Next INR check:   3/18/2020   Priority:   High   Target end date:   Indefinite    Indications    AF (atrial fibrillation) (H) [I48.91]  S/P mitral  valve replacement [Z95.2]  Long term current use of anticoagulant therapy [Z79.01]             Anticoagulation Episode Summary     INR check location:       Preferred lab:   EXTERNAL LAB    Send INR reminders to:   SHANNA PERRY    Comments:   5mg tabs - andrade dose // transfer from [facility] // [facility] // [facility]ENDAR // right knee replaced 7/22/19 //Home care      Anticoagulation Care Providers     Provider Role Specialty Phone number    Jodi Flower Mai, MD  Internal Medicine [phone]

## 2020-03-12 ENCOUNTER — ALLIED HEALTH/NURSE VISIT (OUTPATIENT)
Dept: INFUSION THERAPY | Facility: CLINIC | Age: 80
End: 2020-03-12
Attending: INTERNAL MEDICINE
Payer: MEDICARE

## 2020-03-12 VITALS — OXYGEN SATURATION: 97 % | HEART RATE: 56 BPM | TEMPERATURE: 97.6 F

## 2020-03-12 DIAGNOSIS — M00.80 ARTHRITIS DUE TO OTHER BACTERIA, SEPTIC ARTHRITIS OF UNSPECIFIED LOCATION (H): Primary | ICD-10-CM

## 2020-03-12 PROCEDURE — 96374 THER/PROPH/DIAG INJ IV PUSH: CPT

## 2020-03-12 PROCEDURE — 25000125 ZZHC RX 250: Performed by: INTERNAL MEDICINE

## 2020-03-12 PROCEDURE — 25000128 H RX IP 250 OP 636: Performed by: INTERNAL MEDICINE

## 2020-03-12 RX ORDER — HEPARIN SODIUM,PORCINE 10 UNIT/ML
5 VIAL (ML) INTRAVENOUS
Status: CANCELLED | OUTPATIENT
Start: 2020-03-13

## 2020-03-12 RX ORDER — HEPARIN SODIUM (PORCINE) LOCK FLUSH IV SOLN 100 UNIT/ML 100 UNIT/ML
5 SOLUTION INTRAVENOUS
Status: CANCELLED | OUTPATIENT
Start: 2020-03-13

## 2020-03-12 RX ORDER — CEFTRIAXONE SODIUM 2 G
2 VIAL (EA) INJECTION ONCE
Status: COMPLETED | OUTPATIENT
Start: 2020-03-12 | End: 2020-03-12

## 2020-03-12 RX ORDER — CEFTRIAXONE SODIUM 2 G
2 VIAL (EA) INJECTION ONCE
Status: CANCELLED
Start: 2020-03-13

## 2020-03-12 RX ADMIN — CEFTRIAXONE SODIUM 2 G: 2 INJECTION, POWDER, FOR SOLUTION INTRAMUSCULAR; INTRAVENOUS at 13:45

## 2020-03-12 NOTE — PROGRESS NOTES
Nursing Note:  Fausto Farr presents today for Rocephin, dressing change.    Patient seen by provider today: No   present during visit today: Not Applicable.    Note: N/A.    Intravenous Access:  PICC -- dressing/cap changed per protocol    Discharge Plan:   Patient was discharged to home.  Will RTC 3/13/20 for next dose of Rocephin    Maura Hair RN

## 2020-03-13 ENCOUNTER — ALLIED HEALTH/NURSE VISIT (OUTPATIENT)
Dept: INFUSION THERAPY | Facility: CLINIC | Age: 80
End: 2020-03-13
Attending: INTERNAL MEDICINE
Payer: MEDICARE

## 2020-03-13 ENCOUNTER — TELEPHONE (OUTPATIENT)
Dept: ONCOLOGY | Facility: CLINIC | Age: 80
End: 2020-03-13

## 2020-03-13 VITALS
DIASTOLIC BLOOD PRESSURE: 78 MMHG | HEART RATE: 58 BPM | RESPIRATION RATE: 16 BRPM | SYSTOLIC BLOOD PRESSURE: 112 MMHG | OXYGEN SATURATION: 97 % | TEMPERATURE: 97.6 F

## 2020-03-13 DIAGNOSIS — M00.80 ARTHRITIS DUE TO OTHER BACTERIA, SEPTIC ARTHRITIS OF UNSPECIFIED LOCATION (H): Primary | ICD-10-CM

## 2020-03-13 PROCEDURE — 25000125 ZZHC RX 250: Performed by: INTERNAL MEDICINE

## 2020-03-13 PROCEDURE — 96374 THER/PROPH/DIAG INJ IV PUSH: CPT

## 2020-03-13 PROCEDURE — 25000128 H RX IP 250 OP 636: Performed by: INTERNAL MEDICINE

## 2020-03-13 RX ORDER — HEPARIN SODIUM (PORCINE) LOCK FLUSH IV SOLN 100 UNIT/ML 100 UNIT/ML
5 SOLUTION INTRAVENOUS
Status: CANCELLED | OUTPATIENT
Start: 2020-03-14

## 2020-03-13 RX ORDER — CEFTRIAXONE SODIUM 2 G
2 VIAL (EA) INJECTION ONCE
Status: COMPLETED | OUTPATIENT
Start: 2020-03-13 | End: 2020-03-13

## 2020-03-13 RX ORDER — HEPARIN SODIUM,PORCINE 10 UNIT/ML
5 VIAL (ML) INTRAVENOUS
Status: CANCELLED | OUTPATIENT
Start: 2020-03-14

## 2020-03-13 RX ORDER — CEFTRIAXONE SODIUM 2 G
2 VIAL (EA) INJECTION ONCE
Status: CANCELLED
Start: 2020-03-14

## 2020-03-13 RX ADMIN — CEFTRIAXONE SODIUM 2 G: 2 INJECTION, POWDER, FOR SOLUTION INTRAMUSCULAR; INTRAVENOUS at 13:13

## 2020-03-13 NOTE — TELEPHONE ENCOUNTER
"1. PLACE of CONTACT: \"Where were you when you were exposed to coronavirus?\" (e.g., city, state, country)      no  2. TYPE of CONTACT: \"How much contact was there?\" (e.g., live in same house, work in same office, same school)      no  3. DATE of CONTACT: \"When did you have contact with a coronavirus patient?\" (e.g., days)      n/a  4. SYMPTOMS: \"Do you have any symptoms?\" (e.g., fever, cough, breathing difficulty)      no  5. PREGNANCY OR POSTPARTUM: \"Is there any chance you are pregnant?\" \"When was your last menstrual period?\" \"Did you deliver in the last 2 weeks?\"      no  6. HIGH RISK: \"Do you have any heart or lung problems? Do you have a weakened immune system?\" (e.g., CHF, COPD, asthma, HIV positive, chemotherapy, renal failure, diabetes mellitus, sickle cell anemia)      No  Sandra Mobley RN   "

## 2020-03-13 NOTE — TELEPHONE ENCOUNTER
Left a vm to ask pt call our office to complete pre-screening questions prior to tomorrow visit.   Sandra Mobley RN

## 2020-03-14 ENCOUNTER — INFUSION THERAPY VISIT (OUTPATIENT)
Dept: INFUSION THERAPY | Facility: CLINIC | Age: 80
End: 2020-03-14
Attending: INTERNAL MEDICINE
Payer: MEDICARE

## 2020-03-14 VITALS
HEART RATE: 85 BPM | SYSTOLIC BLOOD PRESSURE: 149 MMHG | OXYGEN SATURATION: 99 % | TEMPERATURE: 97.5 F | RESPIRATION RATE: 18 BRPM | DIASTOLIC BLOOD PRESSURE: 84 MMHG

## 2020-03-14 DIAGNOSIS — M00.80 ARTHRITIS DUE TO OTHER BACTERIA, SEPTIC ARTHRITIS OF UNSPECIFIED LOCATION (H): Primary | ICD-10-CM

## 2020-03-14 PROCEDURE — 25000125 ZZHC RX 250: Performed by: INTERNAL MEDICINE

## 2020-03-14 PROCEDURE — 96374 THER/PROPH/DIAG INJ IV PUSH: CPT

## 2020-03-14 PROCEDURE — 25000128 H RX IP 250 OP 636: Performed by: INTERNAL MEDICINE

## 2020-03-14 RX ORDER — HEPARIN SODIUM,PORCINE 10 UNIT/ML
5 VIAL (ML) INTRAVENOUS
Status: CANCELLED | OUTPATIENT
Start: 2020-03-15

## 2020-03-14 RX ORDER — HEPARIN SODIUM (PORCINE) LOCK FLUSH IV SOLN 100 UNIT/ML 100 UNIT/ML
5 SOLUTION INTRAVENOUS
Status: CANCELLED | OUTPATIENT
Start: 2020-03-15

## 2020-03-14 RX ORDER — CEFTRIAXONE SODIUM 2 G
2 VIAL (EA) INJECTION ONCE
Status: COMPLETED | OUTPATIENT
Start: 2020-03-14 | End: 2020-03-14

## 2020-03-14 RX ORDER — CEFTRIAXONE SODIUM 2 G
2 VIAL (EA) INJECTION ONCE
Status: CANCELLED
Start: 2020-03-15

## 2020-03-14 RX ADMIN — CEFTRIAXONE SODIUM 2 G: 2 INJECTION, POWDER, FOR SOLUTION INTRAMUSCULAR; INTRAVENOUS at 09:39

## 2020-03-14 ASSESSMENT — PAIN SCALES - GENERAL: PAINLEVEL: MILD PAIN (2)

## 2020-03-14 NOTE — PROGRESS NOTES
Infusion Nursing Note:  Fausto Farr presents today for arnulfo.    Patient seen by provider today: No   present during visit today: Not Applicable.    Note: N/A.    Intravenous Access:  PICC.    Treatment Conditions:  Not Applicable.      Post Infusion Assessment:  Patient tolerated infusion without incident.  Blood return noted pre and post infusion.  Site patent and intact, free from redness, edema or discomfort.  No evidence of extravasations.       Discharge Plan:   Discharge instructions reviewed with: Patient.  Patient and/or family verbalized understanding of discharge instructions and all questions answered.  Patient discharged in stable condition accompanied by: self.  Departure Mode: Ambulatory w cane.    Hallie Carlson RN

## 2020-03-15 ENCOUNTER — INFUSION THERAPY VISIT (OUTPATIENT)
Dept: INFUSION THERAPY | Facility: CLINIC | Age: 80
End: 2020-03-15
Attending: INTERNAL MEDICINE
Payer: MEDICARE

## 2020-03-15 VITALS
OXYGEN SATURATION: 98 % | RESPIRATION RATE: 18 BRPM | HEART RATE: 78 BPM | TEMPERATURE: 97.2 F | DIASTOLIC BLOOD PRESSURE: 76 MMHG | SYSTOLIC BLOOD PRESSURE: 146 MMHG

## 2020-03-15 DIAGNOSIS — M00.80 ARTHRITIS DUE TO OTHER BACTERIA, SEPTIC ARTHRITIS OF UNSPECIFIED LOCATION (H): Primary | ICD-10-CM

## 2020-03-15 PROCEDURE — 25000128 H RX IP 250 OP 636: Performed by: INTERNAL MEDICINE

## 2020-03-15 PROCEDURE — 96374 THER/PROPH/DIAG INJ IV PUSH: CPT

## 2020-03-15 PROCEDURE — 25000125 ZZHC RX 250: Performed by: INTERNAL MEDICINE

## 2020-03-15 RX ORDER — CEFTRIAXONE SODIUM 2 G
2 VIAL (EA) INJECTION ONCE
Status: COMPLETED | OUTPATIENT
Start: 2020-03-15 | End: 2020-03-15

## 2020-03-15 RX ORDER — HEPARIN SODIUM,PORCINE 10 UNIT/ML
5 VIAL (ML) INTRAVENOUS
Status: CANCELLED | OUTPATIENT
Start: 2020-03-16

## 2020-03-15 RX ORDER — CEFTRIAXONE SODIUM 2 G
2 VIAL (EA) INJECTION ONCE
Status: CANCELLED
Start: 2020-03-16

## 2020-03-15 RX ORDER — HEPARIN SODIUM (PORCINE) LOCK FLUSH IV SOLN 100 UNIT/ML 100 UNIT/ML
5 SOLUTION INTRAVENOUS
Status: CANCELLED | OUTPATIENT
Start: 2020-03-16

## 2020-03-15 RX ADMIN — CEFTRIAXONE SODIUM 2 G: 2 INJECTION, POWDER, FOR SOLUTION INTRAMUSCULAR; INTRAVENOUS at 10:32

## 2020-03-15 NOTE — PROGRESS NOTES
Infusion Nursing Note:  Fausto Farr presents today for Rocephin.    Patient seen by provider today: No   present during visit today: Not Applicable.    Note: N/A.    Intravenous Access:  PICC.    Treatment Conditions:  Not Applicable.      Post Infusion Assessment:  Patient tolerated infusion without incident.  Blood return noted pre and post infusion.  Site patent and intact, free from redness, edema or discomfort.  No evidence of extravasations.       Discharge Plan:   Discharge instructions reviewed with: Patient.  Patient and/or family verbalized understanding of discharge instructions and all questions answered.  AVS to patient via [employer]Wu.  Patient will return 3/16/20 for next appointment.   Patient discharged in stable condition accompanied by: self.  Departure Mode: Ambulatory w/cane.    Dewey Warren RN

## 2020-03-16 ENCOUNTER — TELEPHONE (OUTPATIENT)
Dept: CARDIOLOGY | Facility: CLINIC | Age: 80
End: 2020-03-16

## 2020-03-16 ENCOUNTER — HOSPITAL ENCOUNTER (OUTPATIENT)
Facility: CLINIC | Age: 80
Setting detail: SPECIMEN
Discharge: HOME | End: 2020-03-16
Attending: INTERNAL MEDICINE | Admitting: INTERNAL MEDICINE
Payer: MEDICARE

## 2020-03-16 ENCOUNTER — ALLIED HEALTH/NURSE VISIT (OUTPATIENT)
Dept: INFUSION THERAPY | Facility: CLINIC | Age: 80
End: 2020-03-16
Attending: INTERNAL MEDICINE
Payer: MEDICARE

## 2020-03-16 VITALS
RESPIRATION RATE: 16 BRPM | OXYGEN SATURATION: 96 % | TEMPERATURE: 98.5 F | DIASTOLIC BLOOD PRESSURE: 66 MMHG | HEART RATE: 79 BPM | SYSTOLIC BLOOD PRESSURE: 111 MMHG

## 2020-03-16 DIAGNOSIS — I48.91 AF (ATRIAL FIBRILLATION) (H): ICD-10-CM

## 2020-03-16 DIAGNOSIS — I48.19 PERSISTENT ATRIAL FIBRILLATION (H): Primary | ICD-10-CM

## 2020-03-16 DIAGNOSIS — M00.80 ARTHRITIS DUE TO OTHER BACTERIA, SEPTIC ARTHRITIS OF UNSPECIFIED LOCATION (H): Primary | ICD-10-CM

## 2020-03-16 LAB
AST SERPL W P-5'-P-CCNC: 29 U/L (ref 0–45)
BASOPHILS # BLD AUTO: 0 10E9/L (ref 0–0.2)
BASOPHILS NFR BLD AUTO: 0.2 %
CREAT SERPL-MCNC: 0.99 MG/DL (ref 0.66–1.25)
CRP SERPL-MCNC: 4 MG/L (ref 0–8)
DIFFERENTIAL METHOD BLD: ABNORMAL
EOSINOPHIL # BLD AUTO: 0.3 10E9/L (ref 0–0.7)
EOSINOPHIL NFR BLD AUTO: 2.7 %
ERYTHROCYTE [DISTWIDTH] IN BLOOD BY AUTOMATED COUNT: 14.9 % (ref 10–15)
ERYTHROCYTE [SEDIMENTATION RATE] IN BLOOD BY WESTERGREN METHOD: 41 MM/H (ref 0–20)
GFR SERPL CREATININE-BSD FRML MDRD: 72 ML/MIN/{1.73_M2}
HCT VFR BLD AUTO: 38 % (ref 40–53)
HGB BLD-MCNC: 11.9 G/DL (ref 13.3–17.7)
IMM GRANULOCYTES # BLD: 0 10E9/L (ref 0–0.4)
IMM GRANULOCYTES NFR BLD: 0.1 %
LYMPHOCYTES # BLD AUTO: 1.5 10E9/L (ref 0.8–5.3)
LYMPHOCYTES NFR BLD AUTO: 16.3 %
MCH RBC QN AUTO: 27.9 PG (ref 26.5–33)
MCHC RBC AUTO-ENTMCNC: 31.3 G/DL (ref 31.5–36.5)
MCV RBC AUTO: 89 FL (ref 78–100)
MONOCYTES # BLD AUTO: 1.3 10E9/L (ref 0–1.3)
MONOCYTES NFR BLD AUTO: 14.2 %
NEUTROPHILS # BLD AUTO: 6.1 10E9/L (ref 1.6–8.3)
NEUTROPHILS NFR BLD AUTO: 66.5 %
PLATELET # BLD AUTO: 331 10E9/L (ref 150–450)
RBC # BLD AUTO: 4.26 10E12/L (ref 4.4–5.9)
WBC # BLD AUTO: 9.2 10E9/L (ref 4–11)

## 2020-03-16 PROCEDURE — 86140 C-REACTIVE PROTEIN: CPT | Performed by: INTERNAL MEDICINE

## 2020-03-16 PROCEDURE — 85652 RBC SED RATE AUTOMATED: CPT | Performed by: INTERNAL MEDICINE

## 2020-03-16 PROCEDURE — 96374 THER/PROPH/DIAG INJ IV PUSH: CPT

## 2020-03-16 PROCEDURE — 25000128 H RX IP 250 OP 636: Performed by: INTERNAL MEDICINE

## 2020-03-16 PROCEDURE — 85025 COMPLETE CBC W/AUTO DIFF WBC: CPT | Performed by: INTERNAL MEDICINE

## 2020-03-16 PROCEDURE — 25000125 ZZHC RX 250: Performed by: INTERNAL MEDICINE

## 2020-03-16 PROCEDURE — 84450 TRANSFERASE (AST) (SGOT): CPT | Performed by: INTERNAL MEDICINE

## 2020-03-16 PROCEDURE — 82565 ASSAY OF CREATININE: CPT | Performed by: INTERNAL MEDICINE

## 2020-03-16 RX ORDER — HEPARIN SODIUM (PORCINE) LOCK FLUSH IV SOLN 100 UNIT/ML 100 UNIT/ML
5 SOLUTION INTRAVENOUS
Status: CANCELLED | OUTPATIENT
Start: 2020-03-17

## 2020-03-16 RX ORDER — HEPARIN SODIUM,PORCINE 10 UNIT/ML
5 VIAL (ML) INTRAVENOUS
Status: CANCELLED | OUTPATIENT
Start: 2020-03-17

## 2020-03-16 RX ORDER — CEFTRIAXONE SODIUM 2 G
2 VIAL (EA) INJECTION ONCE
Status: COMPLETED | OUTPATIENT
Start: 2020-03-16 | End: 2020-03-16

## 2020-03-16 RX ORDER — CEFTRIAXONE SODIUM 2 G
2 VIAL (EA) INJECTION ONCE
Status: CANCELLED
Start: 2020-03-17

## 2020-03-16 RX ADMIN — CEFTRIAXONE SODIUM 2 G: 2 INJECTION, POWDER, FOR SOLUTION INTRAMUSCULAR; INTRAVENOUS at 10:38

## 2020-03-16 ASSESSMENT — PAIN SCALES - GENERAL: PAINLEVEL: MILD PAIN (2)

## 2020-03-16 NOTE — TELEPHONE ENCOUNTER
Can you call Fausto Farr back.   Explain how to use the zio patch and to keep a diary and click the button when he has symptoms. (the order is in)   Then have him follow up with Dr. Vasquez.  At this point I am not sure if this visit should be in person or over the phone.      Thank you,      Anabell

## 2020-03-16 NOTE — PROGRESS NOTES
Nursing Note:  Fausto Farr presents today for labs/rocephin.    Patient seen by provider today: No   present during visit today: Not Applicable.    Note: N/A.    Intravenous Access:  Labs drawn without difficulty.  PICC.    Discharge Plan:   Patient was sent to [facility] for MD appointment.    Irma Boyer RN

## 2020-03-16 NOTE — TELEPHONE ENCOUNTER
"Patient called and he stated that his leg wounds have healed and he has been cleared by Dr. Leija to go ahead with PPM placement if that is still what is recommended or needed.      Per Anabell Xiao CNP on 12/26/19, the plan at that time was to \"wait on pacemaker until wound heals.  At some point he will likely need pacemaker as his heart rates are not perfectly controlled.  I will repeat the zio patch monitor in 3 months.\"    Patient stated that he has noticed that his HR will drop down to 44 occasionally and that he gets lightheaded/dizzy several times a week where he will need to sit and rest for about 5 minutes.    Will route message to Anabell for review and recommendations.    JUSTIN Yates        "

## 2020-03-17 ENCOUNTER — ALLIED HEALTH/NURSE VISIT (OUTPATIENT)
Dept: INFUSION THERAPY | Facility: CLINIC | Age: 80
End: 2020-03-17
Attending: INTERNAL MEDICINE
Payer: MEDICARE

## 2020-03-17 VITALS
HEART RATE: 65 BPM | TEMPERATURE: 96 F | OXYGEN SATURATION: 98 % | SYSTOLIC BLOOD PRESSURE: 121 MMHG | RESPIRATION RATE: 16 BRPM | DIASTOLIC BLOOD PRESSURE: 65 MMHG

## 2020-03-17 DIAGNOSIS — M00.80 ARTHRITIS DUE TO OTHER BACTERIA, SEPTIC ARTHRITIS OF UNSPECIFIED LOCATION (H): Primary | ICD-10-CM

## 2020-03-17 PROCEDURE — 25000125 ZZHC RX 250: Performed by: INTERNAL MEDICINE

## 2020-03-17 PROCEDURE — 96374 THER/PROPH/DIAG INJ IV PUSH: CPT

## 2020-03-17 PROCEDURE — 25000128 H RX IP 250 OP 636: Performed by: INTERNAL MEDICINE

## 2020-03-17 RX ORDER — CEFTRIAXONE SODIUM 2 G
2 VIAL (EA) INJECTION ONCE
Status: CANCELLED
Start: 2020-03-18

## 2020-03-17 RX ORDER — HEPARIN SODIUM (PORCINE) LOCK FLUSH IV SOLN 100 UNIT/ML 100 UNIT/ML
5 SOLUTION INTRAVENOUS
Status: CANCELLED | OUTPATIENT
Start: 2020-03-18

## 2020-03-17 RX ORDER — HEPARIN SODIUM,PORCINE 10 UNIT/ML
5 VIAL (ML) INTRAVENOUS
Status: CANCELLED | OUTPATIENT
Start: 2020-03-18

## 2020-03-17 RX ORDER — CEFTRIAXONE SODIUM 2 G
2 VIAL (EA) INJECTION ONCE
Status: COMPLETED | OUTPATIENT
Start: 2020-03-17 | End: 2020-03-17

## 2020-03-17 RX ADMIN — CEFTRIAXONE SODIUM 2 G: 2 INJECTION, POWDER, FOR SOLUTION INTRAMUSCULAR; INTRAVENOUS at 11:12

## 2020-03-17 NOTE — PROGRESS NOTES
Nursing Note:  Fausto Farr presents today for Rocephin.    Patient seen by provider today: No   present during visit today: Not Applicable.    Note: N/A.    Intravenous Access:  PICC.    Discharge Plan:   Patient was discharged home.     Natalya Juárez RN

## 2020-03-17 NOTE — TELEPHONE ENCOUNTER
Called and reviewed recommendations for ZioPatch and follow-up with Dr. Vasquez with patient.  Reviewed when to push button and to log symptoms.  Pt verbalized understanding and was in agreement with plan.  Patient transferred to scheduling to schedule ZioPatch and follow-up with Dr. Vasquez.    JUSTIN Yates

## 2020-03-18 ENCOUNTER — TELEPHONE (OUTPATIENT)
Dept: PEDIATRICS | Facility: CLINIC | Age: 80
End: 2020-03-18

## 2020-03-18 ENCOUNTER — TRANSFERRED RECORDS (OUTPATIENT)
Dept: HEALTH INFORMATION MANAGEMENT | Facility: CLINIC | Age: 80
End: 2020-03-18

## 2020-03-18 ENCOUNTER — ALLIED HEALTH/NURSE VISIT (OUTPATIENT)
Dept: INFUSION THERAPY | Facility: CLINIC | Age: 80
End: 2020-03-18
Attending: INTERNAL MEDICINE
Payer: MEDICARE

## 2020-03-18 ENCOUNTER — TELEPHONE (OUTPATIENT)
Dept: ONCOLOGY | Facility: CLINIC | Age: 80
End: 2020-03-18

## 2020-03-18 ENCOUNTER — HOSPITAL ENCOUNTER (OUTPATIENT)
Facility: CLINIC | Age: 80
Setting detail: SPECIMEN
Discharge: HOME | End: 2020-03-18
Attending: INTERNAL MEDICINE | Admitting: INTERNAL MEDICINE
Payer: MEDICARE

## 2020-03-18 DIAGNOSIS — Z95.2 S/P MITRAL VALVE REPLACEMENT: ICD-10-CM

## 2020-03-18 DIAGNOSIS — Z79.01 LONG TERM CURRENT USE OF ANTICOAGULANT THERAPY: ICD-10-CM

## 2020-03-18 DIAGNOSIS — M00.80 ARTHRITIS DUE TO OTHER BACTERIA, SEPTIC ARTHRITIS OF UNSPECIFIED LOCATION (H): Primary | ICD-10-CM

## 2020-03-18 DIAGNOSIS — I48.91 AF (ATRIAL FIBRILLATION) (H): ICD-10-CM

## 2020-03-18 LAB — INR PPP: 1.73 (ref 0.86–1.14)

## 2020-03-18 PROCEDURE — 25000125 ZZHC RX 250: Performed by: INTERNAL MEDICINE

## 2020-03-18 PROCEDURE — 85610 PROTHROMBIN TIME: CPT | Performed by: INTERNAL MEDICINE

## 2020-03-18 PROCEDURE — 25000128 H RX IP 250 OP 636: Performed by: INTERNAL MEDICINE

## 2020-03-18 PROCEDURE — 96374 THER/PROPH/DIAG INJ IV PUSH: CPT

## 2020-03-18 RX ORDER — HEPARIN SODIUM (PORCINE) LOCK FLUSH IV SOLN 100 UNIT/ML 100 UNIT/ML
5 SOLUTION INTRAVENOUS
Status: CANCELLED | OUTPATIENT
Start: 2020-03-19

## 2020-03-18 RX ORDER — HEPARIN SODIUM,PORCINE 10 UNIT/ML
5 VIAL (ML) INTRAVENOUS
Status: CANCELLED | OUTPATIENT
Start: 2020-03-19

## 2020-03-18 RX ORDER — CEFTRIAXONE SODIUM 2 G
2 VIAL (EA) INJECTION ONCE
Status: CANCELLED
Start: 2020-03-19

## 2020-03-18 RX ORDER — CEFTRIAXONE SODIUM 2 G
2 VIAL (EA) INJECTION ONCE
Status: COMPLETED | OUTPATIENT
Start: 2020-03-18 | End: 2020-03-18

## 2020-03-18 RX ADMIN — CEFTRIAXONE SODIUM 2 G: 2 INJECTION, POWDER, FOR SOLUTION INTRAMUSCULAR; INTRAVENOUS at 12:30

## 2020-03-18 NOTE — PROGRESS NOTES
Nursing Note:  Fausto Farr presents today for Rocephin and INR draw  Patient seen by provider today: No   present during visit today: Not Applicable.    Note: N/A.    Intravenous Access:  Labs drawn without difficulty.  PICC.    Discharge Plan:   Patient was discharged to home.  Will RTC 3/19/20 for next dose of Rocephin     Maura Hair RN

## 2020-03-18 NOTE — TELEPHONE ENCOUNTER
Patient is currently scheduled for an appointment at [facility] in [place].  Called patient to review current visitor restrictions and complete COVID-19 Patient Infection/Travel Screening Tool.     Due to the recent public health concerns and in an effort to keep our patients and staff safe and healthy, we are implementing a screening process for the patients and visitors that come to our clinic.      At this time, NO visitors are allowed on our hospital and clinic campuses.      I am going to ask you a few questions, please answer yes or no.     In the last month, have you been in contact with someone who was confirmed or suspected to have Coronavirus/COVID-19?  No     Do you have a:  Fever (or reported chills)?  No  Cough?  No  Shortness of breath?  No  Rash?  No    Have you recently traveled internationally in the last month?  No  If so, where?  N/A    Patient PASSED the screening assessment.  Patient instructed to come to the clinic as planned for appointment and to call the clinic right away if anything changes in their health status.    Sandra Mobley RN

## 2020-03-18 NOTE — TELEPHONE ENCOUNTER
ANTICOAGULATION MANAGEMENT     Patient Name:  Fausto Farr  Date:  3/18/2020    ASSESSMENT /SUBJECTIVE:      Today's INR result of 1.73 is subtherapeutic. Goal INR of 2.5-3.5      Warfarin dose taken: Missed dose(s) may be affecting INR    Diet: No new diet changes affecting INR    Medication changes/ interactions: No new medications/supplements affecting INR    Previous INR: Therapeutic     S/S of bleeding or thromboembolism: No    New injury or illness:  No    Upcoming surgery, procedure or cardioversion:  No    Additional findings: None      PLAN:    Spoke with Fausto regarding INR result and instructed:     Warfarin Dosing Instructions: boost 10 mg dose tonight then continue your current warfarin dose of 5 mg on  and friday and 7.5 all other days    Instructed patient to follow up no later than: 1 week  Lab visit scheduled    Education provided: Yes: monitor for clots      Ralph verbalizes understanding and agrees to warfarin dosing plan.    Instructed to call the Anticoagulation Clinic for any changes, questions or concerns. (#[phone])        OBJECTIVE:  INR   Date Value Ref Range Status   2020 1.73 (H) 0.86 - 1.14 Final             Anticoagulation Summary  As of 3/18/2020    INR goal:   2.5-3.5   TTR:   75.9 % (10.9 mo)   INR used for dosin.73! (3/18/2020)   Warfarin maintenance plan:   5 mg (5 mg x 1) every Mon, Fri; 7.5 mg (5 mg x 1.5) all other days   Full warfarin instructions:   3/18: 10 mg; Otherwise 5 mg every Mon, Fri; 7.5 mg all other days   Weekly warfarin total:   47.5 mg   Plan last modified:   Cecy Nails RN (2020)   Next INR check:   3/25/2020   Priority:   High   Target end date:   Indefinite    Indications    AF (atrial fibrillation) (H) [I48.91]  S/P mitral valve replacement [Z95.2]  Long term current use of anticoagulant therapy [Z79.01]             Anticoagulation Episode Summary     INR check location:       Preferred lab:   EXTERNAL LAB    Send INR  reminders to:   SHANNA PERRY    Comments:   5mg tabs - andrade dose // transfer from [facility] // [facility] // CALENDAR // right knee replaced 7/22/19 //Home care      Anticoagulation Care Providers     Provider Role Specialty Phone number    Jodi Flower Mai, MD  Internal Medicine [phone]

## 2020-03-19 ENCOUNTER — ALLIED HEALTH/NURSE VISIT (OUTPATIENT)
Dept: INFUSION THERAPY | Facility: CLINIC | Age: 80
End: 2020-03-19
Attending: INTERNAL MEDICINE
Payer: MEDICARE

## 2020-03-19 ENCOUNTER — HOSPITAL ENCOUNTER (OUTPATIENT)
Facility: CLINIC | Age: 80
Setting detail: SPECIMEN
End: 2020-03-19
Attending: INTERNAL MEDICINE
Payer: MEDICARE

## 2020-03-19 VITALS
DIASTOLIC BLOOD PRESSURE: 79 MMHG | HEART RATE: 84 BPM | OXYGEN SATURATION: 97 % | TEMPERATURE: 97.3 F | SYSTOLIC BLOOD PRESSURE: 130 MMHG

## 2020-03-19 DIAGNOSIS — M00.80 ARTHRITIS DUE TO OTHER BACTERIA, SEPTIC ARTHRITIS OF UNSPECIFIED LOCATION (H): Primary | ICD-10-CM

## 2020-03-19 PROCEDURE — 96374 THER/PROPH/DIAG INJ IV PUSH: CPT

## 2020-03-19 PROCEDURE — 25000128 H RX IP 250 OP 636: Performed by: INTERNAL MEDICINE

## 2020-03-19 PROCEDURE — 25000125 ZZHC RX 250: Performed by: INTERNAL MEDICINE

## 2020-03-19 RX ORDER — HEPARIN SODIUM,PORCINE 10 UNIT/ML
5 VIAL (ML) INTRAVENOUS
Status: CANCELLED | OUTPATIENT
Start: 2020-03-20

## 2020-03-19 RX ORDER — CEFTRIAXONE SODIUM 2 G
2 VIAL (EA) INJECTION ONCE
Status: CANCELLED
Start: 2020-03-20

## 2020-03-19 RX ORDER — HEPARIN SODIUM (PORCINE) LOCK FLUSH IV SOLN 100 UNIT/ML 100 UNIT/ML
5 SOLUTION INTRAVENOUS
Status: CANCELLED | OUTPATIENT
Start: 2020-03-20

## 2020-03-19 RX ORDER — CEFTRIAXONE SODIUM 2 G
2 VIAL (EA) INJECTION ONCE
Status: COMPLETED | OUTPATIENT
Start: 2020-03-19 | End: 2020-03-19

## 2020-03-19 RX ADMIN — CEFTRIAXONE SODIUM 2 G: 2 INJECTION, POWDER, FOR SOLUTION INTRAMUSCULAR; INTRAVENOUS at 12:50

## 2020-03-19 NOTE — PROGRESS NOTES
Nursing Note:  Fausto Farr presents today for Rocephin and dressing change.    Patient seen by provider today: No   present during visit today: Not Applicable.    Note: Pt in afib today.  Denies symptoms. Is schedule to see his cardiologist in two weeks, at which time he is set to wear a holter monitor for a month.  Pt states he will likely need a pacemaker.    Intravenous Access:  PICC.    Discharge Plan:   Patient was discharged to home.  Will RTC 3/20/20 for next dose of Rocephin    Maura Hair RN

## 2020-03-20 ENCOUNTER — ALLIED HEALTH/NURSE VISIT (OUTPATIENT)
Dept: INFUSION THERAPY | Facility: CLINIC | Age: 80
End: 2020-03-20
Attending: INTERNAL MEDICINE
Payer: MEDICARE

## 2020-03-20 VITALS
RESPIRATION RATE: 16 BRPM | HEART RATE: 60 BPM | SYSTOLIC BLOOD PRESSURE: 126 MMHG | TEMPERATURE: 97 F | OXYGEN SATURATION: 98 % | DIASTOLIC BLOOD PRESSURE: 56 MMHG

## 2020-03-20 DIAGNOSIS — M00.80 ARTHRITIS DUE TO OTHER BACTERIA, SEPTIC ARTHRITIS OF UNSPECIFIED LOCATION (H): Primary | ICD-10-CM

## 2020-03-20 PROCEDURE — 25000125 ZZHC RX 250: Performed by: INTERNAL MEDICINE

## 2020-03-20 PROCEDURE — 96374 THER/PROPH/DIAG INJ IV PUSH: CPT

## 2020-03-20 PROCEDURE — 25000128 H RX IP 250 OP 636: Performed by: INTERNAL MEDICINE

## 2020-03-20 RX ORDER — HEPARIN SODIUM,PORCINE 10 UNIT/ML
5 VIAL (ML) INTRAVENOUS
Status: CANCELLED | OUTPATIENT
Start: 2020-03-21

## 2020-03-20 RX ORDER — CEFTRIAXONE SODIUM 2 G
2 VIAL (EA) INJECTION ONCE
Status: COMPLETED | OUTPATIENT
Start: 2020-03-20 | End: 2020-03-20

## 2020-03-20 RX ORDER — HEPARIN SODIUM (PORCINE) LOCK FLUSH IV SOLN 100 UNIT/ML 100 UNIT/ML
5 SOLUTION INTRAVENOUS
Status: CANCELLED | OUTPATIENT
Start: 2020-03-21

## 2020-03-20 RX ORDER — CEFTRIAXONE SODIUM 2 G
2 VIAL (EA) INJECTION ONCE
Status: CANCELLED
Start: 2020-03-21

## 2020-03-20 RX ADMIN — CEFTRIAXONE SODIUM 2 G: 2 INJECTION, POWDER, FOR SOLUTION INTRAMUSCULAR; INTRAVENOUS at 12:46

## 2020-03-20 NOTE — PROGRESS NOTES
Infusion Nursing Note:  Fausto Farr presents today for Rocephin.    Patient seen by provider today: No   present during visit today: Not Applicable.    Note: N/A.    Intravenous Access:  PICC.    Treatment Conditions:  Not Applicable.      Post Infusion Assessment:  Patient tolerated infusion without incident.  Site patent and intact, free from redness, edema or discomfort.  No evidence of extravasations.      Discharge Plan:   AVS to patient via MYCHART.  Patient will return to [facility] 3/21 for next appointment.   Patient discharged in stable condition accompanied by: self.  Departure Mode: Ambulatory.    Sury Gould RN

## 2020-03-21 ENCOUNTER — INFUSION THERAPY VISIT (OUTPATIENT)
Dept: INFUSION THERAPY | Facility: CLINIC | Age: 80
End: 2020-03-21
Attending: INTERNAL MEDICINE
Payer: MEDICARE

## 2020-03-21 VITALS
RESPIRATION RATE: 20 BRPM | SYSTOLIC BLOOD PRESSURE: 164 MMHG | TEMPERATURE: 97.5 F | OXYGEN SATURATION: 99 % | HEART RATE: 59 BPM | DIASTOLIC BLOOD PRESSURE: 69 MMHG

## 2020-03-21 DIAGNOSIS — M00.80 ARTHRITIS DUE TO OTHER BACTERIA, SEPTIC ARTHRITIS OF UNSPECIFIED LOCATION (H): Primary | ICD-10-CM

## 2020-03-21 PROCEDURE — 25000128 H RX IP 250 OP 636: Performed by: INTERNAL MEDICINE

## 2020-03-21 PROCEDURE — 96374 THER/PROPH/DIAG INJ IV PUSH: CPT

## 2020-03-21 PROCEDURE — 25000125 ZZHC RX 250: Performed by: INTERNAL MEDICINE

## 2020-03-21 RX ORDER — CEFTRIAXONE SODIUM 2 G
2 VIAL (EA) INJECTION ONCE
Status: CANCELLED
Start: 2020-03-22

## 2020-03-21 RX ORDER — HEPARIN SODIUM,PORCINE 10 UNIT/ML
5 VIAL (ML) INTRAVENOUS
Status: CANCELLED | OUTPATIENT
Start: 2020-03-22

## 2020-03-21 RX ORDER — CEFTRIAXONE SODIUM 2 G
2 VIAL (EA) INJECTION ONCE
Status: COMPLETED | OUTPATIENT
Start: 2020-03-21 | End: 2020-03-21

## 2020-03-21 RX ORDER — HEPARIN SODIUM (PORCINE) LOCK FLUSH IV SOLN 100 UNIT/ML 100 UNIT/ML
5 SOLUTION INTRAVENOUS
Status: CANCELLED | OUTPATIENT
Start: 2020-03-22

## 2020-03-21 RX ADMIN — CEFTRIAXONE SODIUM 2 G: 2 INJECTION, POWDER, FOR SOLUTION INTRAMUSCULAR; INTRAVENOUS at 11:08

## 2020-03-21 ASSESSMENT — PAIN SCALES - GENERAL: PAINLEVEL: NO PAIN (0)

## 2020-03-21 NOTE — PROGRESS NOTES
Infusion Nursing Note:  Fausto Farr presents today for Rocephin.    Patient seen by provider today: No   present during visit today: Not Applicable.    Note: N/A.    Intravenous Access:  PICC.    Treatment Conditions:  Not Applicable.      Post Infusion Assessment:  Patient tolerated infusion without incident.  Blood return noted pre and post infusion.  Site patent and intact, free from redness, edema or discomfort.  No evidence of extravasations.       Discharge Plan:   Patient declined prescription refills.  Discharge instructions reviewed with: Patient.  Patient verbalized understanding of discharge instructions and all questions answered.  AVS to patient via EbixT.  Patient will return 3/22/20 for next appointment.   Patient discharged in stable condition accompanied by: self.  Departure Mode: Ambulatory.    Dewey Warren RN

## 2020-03-22 ENCOUNTER — INFUSION THERAPY VISIT (OUTPATIENT)
Dept: INFUSION THERAPY | Facility: CLINIC | Age: 80
End: 2020-03-22
Attending: INTERNAL MEDICINE
Payer: MEDICARE

## 2020-03-22 VITALS
DIASTOLIC BLOOD PRESSURE: 66 MMHG | OXYGEN SATURATION: 96 % | TEMPERATURE: 97.8 F | SYSTOLIC BLOOD PRESSURE: 134 MMHG | RESPIRATION RATE: 20 BRPM

## 2020-03-22 DIAGNOSIS — M00.80 ARTHRITIS DUE TO OTHER BACTERIA, SEPTIC ARTHRITIS OF UNSPECIFIED LOCATION (H): Primary | ICD-10-CM

## 2020-03-22 PROCEDURE — 96374 THER/PROPH/DIAG INJ IV PUSH: CPT

## 2020-03-22 PROCEDURE — 25000125 ZZHC RX 250: Performed by: INTERNAL MEDICINE

## 2020-03-22 PROCEDURE — 25000128 H RX IP 250 OP 636: Performed by: INTERNAL MEDICINE

## 2020-03-22 RX ORDER — CEFTRIAXONE SODIUM 2 G
2 VIAL (EA) INJECTION ONCE
Status: COMPLETED | OUTPATIENT
Start: 2020-03-22 | End: 2020-03-22

## 2020-03-22 RX ORDER — HEPARIN SODIUM,PORCINE 10 UNIT/ML
5 VIAL (ML) INTRAVENOUS
Status: CANCELLED | OUTPATIENT
Start: 2020-03-23

## 2020-03-22 RX ORDER — HEPARIN SODIUM (PORCINE) LOCK FLUSH IV SOLN 100 UNIT/ML 100 UNIT/ML
5 SOLUTION INTRAVENOUS
Status: CANCELLED | OUTPATIENT
Start: 2020-03-23

## 2020-03-22 RX ORDER — CEFTRIAXONE SODIUM 2 G
2 VIAL (EA) INJECTION ONCE
Status: CANCELLED
Start: 2020-03-23

## 2020-03-22 RX ADMIN — CEFTRIAXONE SODIUM 2 G: 2 INJECTION, POWDER, FOR SOLUTION INTRAMUSCULAR; INTRAVENOUS at 10:52

## 2020-03-22 NOTE — PROGRESS NOTES
Infusion Nursing Note:  Fausto Farr presents today for Rocephin.    Patient seen by provider today: No   present during visit today: Not Applicable.    Note: N/A.    Intravenous Access:  PICC.    Treatment Conditions:  Not Applicable.      Post Infusion Assessment:  Patient tolerated infusion without incident.  Blood return noted pre and post infusion.  Site patent and intact, free from redness, edema or discomfort.  No evidence of extravasations.       Discharge Plan:   Patient declined prescription refills.  Discharge instructions reviewed with: Patient.  Patient verbalized understanding of discharge instructions and all questions answered.  AVS to patient via EventfulT.  Patient will return 3/23/20 for next appointment.   Patient discharged in stable condition accompanied by: self.  Departure Mode: Ambulatory.    Johanny Carrillo RN

## 2020-03-23 ENCOUNTER — ALLIED HEALTH/NURSE VISIT (OUTPATIENT)
Dept: INFUSION THERAPY | Facility: CLINIC | Age: 80
End: 2020-03-23
Attending: INTERNAL MEDICINE
Payer: MEDICARE

## 2020-03-23 ENCOUNTER — HOSPITAL ENCOUNTER (OUTPATIENT)
Facility: CLINIC | Age: 80
Setting detail: SPECIMEN
Discharge: HOME | End: 2020-03-23
Attending: INTERNAL MEDICINE | Admitting: INTERNAL MEDICINE
Payer: MEDICARE

## 2020-03-23 VITALS
OXYGEN SATURATION: 97 % | SYSTOLIC BLOOD PRESSURE: 117 MMHG | DIASTOLIC BLOOD PRESSURE: 71 MMHG | RESPIRATION RATE: 20 BRPM | TEMPERATURE: 98.2 F

## 2020-03-23 DIAGNOSIS — M00.80 ARTHRITIS DUE TO OTHER BACTERIA, SEPTIC ARTHRITIS OF UNSPECIFIED LOCATION (H): Primary | ICD-10-CM

## 2020-03-23 LAB
AST SERPL W P-5'-P-CCNC: 17 U/L (ref 0–45)
BASOPHILS # BLD AUTO: 0.1 10E9/L (ref 0–0.2)
BASOPHILS NFR BLD AUTO: 0.8 %
CREAT SERPL-MCNC: 0.83 MG/DL (ref 0.66–1.25)
CRP SERPL-MCNC: 8.3 MG/L (ref 0–8)
DIFFERENTIAL METHOD BLD: ABNORMAL
EOSINOPHIL # BLD AUTO: 0.2 10E9/L (ref 0–0.7)
EOSINOPHIL NFR BLD AUTO: 2.6 %
ERYTHROCYTE [DISTWIDTH] IN BLOOD BY AUTOMATED COUNT: 14.2 % (ref 10–15)
ERYTHROCYTE [SEDIMENTATION RATE] IN BLOOD BY WESTERGREN METHOD: 56 MM/H (ref 0–20)
GFR SERPL CREATININE-BSD FRML MDRD: 84 ML/MIN/{1.73_M2}
HCT VFR BLD AUTO: 39.2 % (ref 40–53)
HGB BLD-MCNC: 12.2 G/DL (ref 13.3–17.7)
IMM GRANULOCYTES # BLD: 0 10E9/L (ref 0–0.4)
IMM GRANULOCYTES NFR BLD: 0.4 %
LYMPHOCYTES # BLD AUTO: 1.9 10E9/L (ref 0.8–5.3)
LYMPHOCYTES NFR BLD AUTO: 20.9 %
MCH RBC QN AUTO: 28.4 PG (ref 26.5–33)
MCHC RBC AUTO-ENTMCNC: 31.1 G/DL (ref 31.5–36.5)
MCV RBC AUTO: 91 FL (ref 78–100)
MONOCYTES # BLD AUTO: 1.2 10E9/L (ref 0–1.3)
MONOCYTES NFR BLD AUTO: 12.9 %
NEUTROPHILS # BLD AUTO: 5.7 10E9/L (ref 1.6–8.3)
NEUTROPHILS NFR BLD AUTO: 62.4 %
NRBC # BLD AUTO: 0 10*3/UL
NRBC BLD AUTO-RTO: 0 /100
PLATELET # BLD AUTO: 349 10E9/L (ref 150–450)
RBC # BLD AUTO: 4.3 10E12/L (ref 4.4–5.9)
WBC # BLD AUTO: 9.2 10E9/L (ref 4–11)

## 2020-03-23 PROCEDURE — 82565 ASSAY OF CREATININE: CPT | Performed by: INTERNAL MEDICINE

## 2020-03-23 PROCEDURE — 25000125 ZZHC RX 250: Performed by: INTERNAL MEDICINE

## 2020-03-23 PROCEDURE — 96374 THER/PROPH/DIAG INJ IV PUSH: CPT

## 2020-03-23 PROCEDURE — 86140 C-REACTIVE PROTEIN: CPT | Performed by: INTERNAL MEDICINE

## 2020-03-23 PROCEDURE — 85652 RBC SED RATE AUTOMATED: CPT | Performed by: INTERNAL MEDICINE

## 2020-03-23 PROCEDURE — 85025 COMPLETE CBC W/AUTO DIFF WBC: CPT | Performed by: INTERNAL MEDICINE

## 2020-03-23 PROCEDURE — 84450 TRANSFERASE (AST) (SGOT): CPT | Performed by: INTERNAL MEDICINE

## 2020-03-23 PROCEDURE — 25000128 H RX IP 250 OP 636: Performed by: INTERNAL MEDICINE

## 2020-03-23 RX ORDER — CEFTRIAXONE SODIUM 2 G
2 VIAL (EA) INJECTION ONCE
Status: CANCELLED
Start: 2020-03-24

## 2020-03-23 RX ORDER — HEPARIN SODIUM (PORCINE) LOCK FLUSH IV SOLN 100 UNIT/ML 100 UNIT/ML
5 SOLUTION INTRAVENOUS
Status: CANCELLED | OUTPATIENT
Start: 2020-03-24

## 2020-03-23 RX ORDER — HEPARIN SODIUM,PORCINE 10 UNIT/ML
5 VIAL (ML) INTRAVENOUS
Status: CANCELLED | OUTPATIENT
Start: 2020-03-24

## 2020-03-23 RX ORDER — HEPARIN SODIUM,PORCINE 10 UNIT/ML
5 VIAL (ML) INTRAVENOUS
Status: DISCONTINUED | OUTPATIENT
Start: 2020-03-23 | End: 2020-03-23 | Stop reason: HOSPADM

## 2020-03-23 RX ORDER — CEFTRIAXONE SODIUM 2 G
2 VIAL (EA) INJECTION ONCE
Status: COMPLETED | OUTPATIENT
Start: 2020-03-23 | End: 2020-03-23

## 2020-03-23 RX ADMIN — CEFTRIAXONE SODIUM 2 G: 2 INJECTION, POWDER, FOR SOLUTION INTRAMUSCULAR; INTRAVENOUS at 12:41

## 2020-03-23 NOTE — PROGRESS NOTES
Infusion Nursing Note:  Fausto Farr presents today for Rocephin.    Patient seen by provider today: No   present during visit today: Not Applicable.    Note: N/A.    Intravenous Access:  Labs drawn without difficulty.  PICC.    Treatment Conditions:  Not Applicable.      Post Infusion Assessment:  Patient tolerated infusion without incident.  Blood return noted pre and post infusion.  Site patent and intact, free from redness, edema or discomfort.  No evidence of extravasations.      Discharge Plan:   Discharge instructions reviewed with: Patient.  Patient and/or family verbalized understanding of discharge instructions and all questions answered.  AVS to patient via [employer].  Patient will return 3/24/20 for next appointment.   Patient discharged in stable condition accompanied by: self.  Departure Mode: Ambulatory.    Dianelys Hanley RN

## 2020-03-24 ENCOUNTER — TELEPHONE (OUTPATIENT)
Dept: CARDIOLOGY | Facility: CLINIC | Age: 80
End: 2020-03-24

## 2020-03-24 ENCOUNTER — ALLIED HEALTH/NURSE VISIT (OUTPATIENT)
Dept: INFUSION THERAPY | Facility: CLINIC | Age: 80
End: 2020-03-24
Attending: INTERNAL MEDICINE
Payer: MEDICARE

## 2020-03-24 ENCOUNTER — HOSPITAL ENCOUNTER (OUTPATIENT)
Facility: CLINIC | Age: 80
Setting detail: SPECIMEN
End: 2020-03-24
Attending: INTERNAL MEDICINE
Payer: MEDICARE

## 2020-03-24 VITALS
OXYGEN SATURATION: 98 % | TEMPERATURE: 97.8 F | HEART RATE: 55 BPM | DIASTOLIC BLOOD PRESSURE: 55 MMHG | SYSTOLIC BLOOD PRESSURE: 113 MMHG | RESPIRATION RATE: 16 BRPM

## 2020-03-24 DIAGNOSIS — I48.91 AF (ATRIAL FIBRILLATION) (H): ICD-10-CM

## 2020-03-24 DIAGNOSIS — M00.80 ARTHRITIS DUE TO OTHER BACTERIA, SEPTIC ARTHRITIS OF UNSPECIFIED LOCATION (H): Primary | ICD-10-CM

## 2020-03-24 DIAGNOSIS — I48.91 ATRIAL FIBRILLATION, UNSPECIFIED TYPE (H): Primary | ICD-10-CM

## 2020-03-24 PROCEDURE — 25000128 H RX IP 250 OP 636: Performed by: INTERNAL MEDICINE

## 2020-03-24 PROCEDURE — 25000125 ZZHC RX 250: Performed by: INTERNAL MEDICINE

## 2020-03-24 PROCEDURE — 96374 THER/PROPH/DIAG INJ IV PUSH: CPT

## 2020-03-24 RX ORDER — CEFTRIAXONE SODIUM 2 G
2 VIAL (EA) INJECTION ONCE
Status: CANCELLED
Start: 2020-03-25

## 2020-03-24 RX ORDER — HEPARIN SODIUM (PORCINE) LOCK FLUSH IV SOLN 100 UNIT/ML 100 UNIT/ML
5 SOLUTION INTRAVENOUS
Status: CANCELLED | OUTPATIENT
Start: 2020-03-25

## 2020-03-24 RX ORDER — CEFTRIAXONE SODIUM 2 G
2 VIAL (EA) INJECTION ONCE
Status: COMPLETED | OUTPATIENT
Start: 2020-03-24 | End: 2020-03-24

## 2020-03-24 RX ORDER — HEPARIN SODIUM,PORCINE 10 UNIT/ML
5 VIAL (ML) INTRAVENOUS
Status: CANCELLED | OUTPATIENT
Start: 2020-03-25

## 2020-03-24 RX ADMIN — CEFTRIAXONE SODIUM 2 G: 2 INJECTION, POWDER, FOR SOLUTION INTRAMUSCULAR; INTRAVENOUS at 13:38

## 2020-03-24 NOTE — TELEPHONE ENCOUNTER
Spoke to patient today.  In the setting of the COVID 19 outbreak patient was called to determine if his visit could be delayed to reduce exposure.  Patient states he has been feeling well and denies symptoms of dizziness, shortness of breath, lightheadedness or presyncope.  He is continuing with antibiotic therapy for at least 3 more months.  Will delay davonte arvizu and Dr. Vasquez follow up visit for 3 months.

## 2020-03-24 NOTE — PROGRESS NOTES
Infusion Nursing Note:  Fausto Farr presents today for Rocephin.    Patient seen by provider today: No   present during visit today: Not Applicable.    Note: Patient has last treatment tomorrow.     Intravenous Access:  PICC.    Treatment Conditions:  Not Applicable.      Post Infusion Assessment:  Patient tolerated infusion without incident.  Blood return noted pre and post infusion.  Site patent and intact, free from redness, edema or discomfort.  No evidence of extravasations.       Discharge Plan:    Patient will return 3/25 for next appointment.  Patient discharged in stable condition accompanied by: self.  Departure Mode: Ambulatory with cane.    Maura Frances RN

## 2020-03-25 ENCOUNTER — TELEPHONE (OUTPATIENT)
Dept: PEDIATRICS | Facility: CLINIC | Age: 80
End: 2020-03-25

## 2020-03-25 ENCOUNTER — ALLIED HEALTH/NURSE VISIT (OUTPATIENT)
Dept: INFUSION THERAPY | Facility: CLINIC | Age: 80
End: 2020-03-25
Attending: INTERNAL MEDICINE
Payer: MEDICARE

## 2020-03-25 ENCOUNTER — HOSPITAL ENCOUNTER (OUTPATIENT)
Facility: CLINIC | Age: 80
Setting detail: SPECIMEN
Discharge: HOME | End: 2020-03-25
Attending: INTERNAL MEDICINE | Admitting: INTERNAL MEDICINE
Payer: MEDICARE

## 2020-03-25 VITALS
TEMPERATURE: 97.6 F | RESPIRATION RATE: 16 BRPM | HEART RATE: 69 BPM | OXYGEN SATURATION: 98 % | DIASTOLIC BLOOD PRESSURE: 61 MMHG | SYSTOLIC BLOOD PRESSURE: 141 MMHG

## 2020-03-25 DIAGNOSIS — Z95.2 S/P MITRAL VALVE REPLACEMENT: ICD-10-CM

## 2020-03-25 DIAGNOSIS — I48.91 AF (ATRIAL FIBRILLATION) (H): ICD-10-CM

## 2020-03-25 DIAGNOSIS — Z79.01 LONG TERM CURRENT USE OF ANTICOAGULANT THERAPY: ICD-10-CM

## 2020-03-25 DIAGNOSIS — M00.80 ARTHRITIS DUE TO OTHER BACTERIA, SEPTIC ARTHRITIS OF UNSPECIFIED LOCATION (H): Primary | ICD-10-CM

## 2020-03-25 DIAGNOSIS — I48.19 PERSISTENT ATRIAL FIBRILLATION (H): ICD-10-CM

## 2020-03-25 LAB — INR PPP: 1.15 (ref 0.86–1.14)

## 2020-03-25 PROCEDURE — 96374 THER/PROPH/DIAG INJ IV PUSH: CPT

## 2020-03-25 PROCEDURE — 25000128 H RX IP 250 OP 636: Performed by: INTERNAL MEDICINE

## 2020-03-25 PROCEDURE — 25000125 ZZHC RX 250: Performed by: INTERNAL MEDICINE

## 2020-03-25 PROCEDURE — 85610 PROTHROMBIN TIME: CPT | Performed by: INTERNAL MEDICINE

## 2020-03-25 RX ORDER — CEFTRIAXONE SODIUM 2 G
2 VIAL (EA) INJECTION ONCE
Status: CANCELLED
Start: 2020-03-30

## 2020-03-25 RX ORDER — WARFARIN SODIUM 5 MG/1
15 TABLET ORAL DAILY
Qty: 280 TABLET | Refills: 0 | Status: SHIPPED | OUTPATIENT
Start: 2020-03-25 | End: 2020-05-14

## 2020-03-25 RX ORDER — CEFTRIAXONE SODIUM 2 G
2 VIAL (EA) INJECTION ONCE
Status: COMPLETED | OUTPATIENT
Start: 2020-03-25 | End: 2020-03-25

## 2020-03-25 RX ORDER — HEPARIN SODIUM,PORCINE 10 UNIT/ML
5 VIAL (ML) INTRAVENOUS
Status: CANCELLED | OUTPATIENT
Start: 2020-03-30

## 2020-03-25 RX ORDER — HEPARIN SODIUM (PORCINE) LOCK FLUSH IV SOLN 100 UNIT/ML 100 UNIT/ML
5 SOLUTION INTRAVENOUS
Status: CANCELLED | OUTPATIENT
Start: 2020-03-30

## 2020-03-25 RX ADMIN — CEFTRIAXONE SODIUM 2 G: 2 INJECTION, POWDER, FOR SOLUTION INTRAMUSCULAR; INTRAVENOUS at 12:25

## 2020-03-25 NOTE — PROGRESS NOTES
Infusion Nursing Note:  Fausto Farr presents today for Rocephin and PICC removal.    Patient seen by provider today: No   present during visit today: Not Applicable.    Note: PICC removed per protocol.  No bleeding noted on occlusive dressing at end of appointment.  Patient education provided as to how to care for PICC site at home and when to call MD.    Intravenous Access:  PICC.    Treatment Conditions:  Not Applicable.      Post Infusion Assessment:  Patient tolerated infusion without incident.  Site patent and intact, free from redness, edema or discomfort.  No evidence of extravasations.  Access discontinued per protocol.       Discharge Plan:   AVS to patient via MYCHART.  Patient discharged in stable condition accompanied by: self.  Departure Mode: Ambulatory.    Sury Gould RN

## 2020-03-25 NOTE — TELEPHONE ENCOUNTER
ANTICOAGULATION MANAGEMENT     Patient Name:  Fausto Farr  Date:  3/25/2020    ASSESSMENT /SUBJECTIVE:      Today's INR result of 1.15 is subtherapeutic. Goal INR of 2.5-3.5      Warfarin dose taken: Warfarin taken as previously instructed    Diet: No new diet changes affecting INR    Medication changes/ interactions: Interaction between rifampin and warfarin may be affecting INR - started last Thursday and will take for 3 months    He will also start Augmentin tomorrow for 3 months since he finished IV Rocephin today and his PICC line was removed    Previous INR: Subtherapeutic     S/S of bleeding or thromboembolism: No    New injury or illness:  No    Upcoming surgery, procedure or cardioversion:  No    Additional findings: none      PLAN:    Spoke with Fausto regarding INR result and instructed:     Warfarin Dosing Instructions: Take 20 mg today only then change your warfarin dose to 15 mg daily   Consulted with [facility] Pharmacist, for dosing  Pt will start bridging with Lovenox BID until INR is therapeutic per protocol and pharmacist    Instructed patient to follow up no later than: 3/30  Lab visit scheduled    Education provided: Yes: significance of INR result, interaction between warfarin and rifampin and augmentin      Ralph verbalizes understanding and agrees to warfarin dosing plan.    Instructed to call the Anticoagulation Clinic for any changes, questions or concerns. (#[phone])        OBJECTIVE:  INR   Date Value Ref Range Status   2020 1.15 (H) 0.86 - 1.14 Final             Anticoagulation Summary  As of 3/25/2020    INR goal:   2.5-3.5   TTR:   75.9 % (10.9 mo)   INR used for dosin.15! (3/25/2020)   Warfarin maintenance plan:   15 mg (5 mg x 3) every day   Full warfarin instructions:   3/25: 20 mg; Otherwise 15 mg every day   Weekly warfarin total:   105 mg   Plan last modified:   Cecy Nails, RN (3/25/2020)   Next INR check:   3/30/2020   Priority:   High   Target end  date:   Indefinite    Indications    AF (atrial fibrillation) (H) [I48.91]  S/P mitral valve replacement [Z95.2]  Long term current use of anticoagulant therapy [Z79.01]             Anticoagulation Episode Summary     INR check location:       Preferred lab:   EXTERNAL LAB    Send INR reminders to:   SHANNA PERRY    Comments:   5mg tabs - andrade dose // transfer from [facility] // [facility] // CALENDAR // right knee replaced 7/22/19 //Home care      Anticoagulation Care Providers     Provider Role Specialty Phone number    Jodi Flower Mai, MD  Internal Medicine [phone]

## 2020-03-25 NOTE — TELEPHONE ENCOUNTER
Estimated Creatinine Clearance: 77 mL/min (based on SCr of 0.83 mg/dL).  Weight verified at 91kg.    Need to start Lovenox until INR therapeutic.  With rifampin known interaction will does at ~2x normal weekly total, recheck Monday.  Known drug interaction, reported at least 2-3x normal warfarin dose.  From Up To Date:    Discussion In a pharmacokinetic study of 4 healthy volunteers, rifampin (300 mg twice daily) given with racemic warfarin (0.75 mg/kg single dose) decreased the R-warfarin AUC 70% and decreased the S-warfarin AUC 74%.1 Additional pharmacokinetic studies have found that the addition of rifampin (600 mg daily) to warfarin therapy decreased warfarin plasma concentrations 57% to 69%.2,3 In all studies, prothrombin time was significantly reduced during rifampin coadministration.1,2,3 In support of these pharmacokinetic studies, numerous case reports describe patients taking vitamin K antagonists who experienced subtherapeutic INRs, thromboembolic events, or an inability to achieve a therapeutic INR with concomitant rifampin use, or bleeding events and elevated INRs when rifampin was discontinued.4,,5,6,7,8,9,10,11,12,13,14,15,16      Flaquita Rios, Kristina [facility] CenterCP  Anticoagulation Clinical Pharmacist

## 2020-03-30 ENCOUNTER — TELEPHONE (OUTPATIENT)
Dept: PEDIATRICS | Facility: CLINIC | Age: 80
End: 2020-03-30

## 2020-03-30 DIAGNOSIS — I48.91 AF (ATRIAL FIBRILLATION) (H): ICD-10-CM

## 2020-03-30 DIAGNOSIS — Z95.2 S/P MITRAL VALVE REPLACEMENT: ICD-10-CM

## 2020-03-30 DIAGNOSIS — Z79.01 LONG TERM CURRENT USE OF ANTICOAGULANT THERAPY: ICD-10-CM

## 2020-03-30 LAB
CAPILLARY BLOOD COLLECTION: NORMAL
INR PPP: 1.6 (ref 0.86–1.14)

## 2020-03-30 PROCEDURE — 36416 COLLJ CAPILLARY BLOOD SPEC: CPT | Performed by: INTERNAL MEDICINE

## 2020-03-30 PROCEDURE — 85610 PROTHROMBIN TIME: CPT | Performed by: INTERNAL MEDICINE

## 2020-03-30 NOTE — TELEPHONE ENCOUNTER
Patient has appointment scheduled with Dr. Flower on 4/7. This appointment needs to be rescheduled for July or after. Left message for patient to return call.    ORA WATSON MA on 3/30/2020 at 3:28 PM

## 2020-03-30 NOTE — TELEPHONE ENCOUNTER
ANTICOAGULATION MANAGEMENT     Patient Name:  Fausto Farr  Date:  3/30/2020    ASSESSMENT /SUBJECTIVE:      Today's INR result of 1.6 is subtherapeutic. Goal INR of 2.5-3.5      Warfarin dose taken: Warfarin taken as previously instructed    Diet: No new diet changes affecting INR    Medication changes/ interactions: Interaction between rifampin and warfarin may be affecting INR    Previous INR: Subtherapeutic     S/S of bleeding or thromboembolism: No    New injury or illness:  No    Upcoming surgery, procedure or cardioversion:  No    Additional findings: Patient has not start Lovenox injections as advised. He has picked up the prescription and is agreeable to starting now. Dosing recommendations per Fanny IssaD.      PLAN:    Spoke with Fausto regarding INR result and instructed:     Warfarin Dosing Instructions: 30 mg M, 30 mg Tues, 25 mg Wed, 20 mg Th    Instructed patient to follow up no later than: 4 days  Lab visit scheduled    Education provided: No      Ralph verbalizes understanding and agrees to warfarin dosing plan.    Instructed to call the Anticoagulation Clinic for any changes, questions or concerns. (#[phone])        OBJECTIVE:  INR   Date Value Ref Range Status   2020 1.60 (H) 0.86 - 1.14 Final     Comment:     This test is intended for monitoring Coumadin therapy.  Results are not   accurate in patients with prolonged INR due to factor deficiency.               Anticoagulation Summary  As of 3/30/2020    INR goal:   2.5-3.5   TTR:   74.7 % (10.9 mo)   INR used for dosin.60! (3/30/2020)   Warfarin maintenance plan:   15 mg (5 mg x 3) every day   Full warfarin instructions:   3/30: 30 mg; 3/31: 30 mg; : 25 mg; : 20 mg; Otherwise 15 mg every day   Weekly warfarin total:   105 mg   Plan last modified:   Cecy Nails RN (3/25/2020)   Next INR check:   4/3/2020   Priority:   High   Target end date:   Indefinite    Indications    AF (atrial fibrillation) (H)  [I48.91]  S/P mitral valve replacement [Z95.2]  Long term current use of anticoagulant therapy [Z79.01]             Anticoagulation Episode Summary     INR check location:       Preferred lab:   EXTERNAL LAB    Send INR reminders to:   SHANNA PERRY    Comments:   5mg tabs - andrade dose // transfer from [facility] // [facility] // CALENDAR // right knee replaced 7/22/19 //Home care      Anticoagulation Care Providers     Provider Role Specialty Phone number    Jodi Flower Mai, MD  Internal Medicine [phone]

## 2020-03-30 NOTE — TELEPHONE ENCOUNTER
Left message for patient to call back anticoag nurse to verify dosing and any changes in regards to INR result from today.

## 2020-04-03 ENCOUNTER — TELEPHONE (OUTPATIENT)
Dept: PEDIATRICS | Facility: CLINIC | Age: 80
End: 2020-04-03

## 2020-04-03 DIAGNOSIS — Z95.2 S/P MITRAL VALVE REPLACEMENT: ICD-10-CM

## 2020-04-03 DIAGNOSIS — I48.91 AF (ATRIAL FIBRILLATION) (H): ICD-10-CM

## 2020-04-03 DIAGNOSIS — Z79.01 LONG TERM CURRENT USE OF ANTICOAGULANT THERAPY: ICD-10-CM

## 2020-04-03 LAB
CAPILLARY BLOOD COLLECTION: NORMAL
INR PPP: 3.2 (ref 0.86–1.14)

## 2020-04-03 PROCEDURE — 85610 PROTHROMBIN TIME: CPT | Performed by: INTERNAL MEDICINE

## 2020-04-03 PROCEDURE — 36416 COLLJ CAPILLARY BLOOD SPEC: CPT | Performed by: INTERNAL MEDICINE

## 2020-04-03 NOTE — TELEPHONE ENCOUNTER
ANTICOAGULATION MANAGEMENT     Patient Name:  Fausto Farr  Date:  4/3/2020    ASSESSMENT /SUBJECTIVE:    Today's INR result of 3.2 is therapeutic. Goal INR of 2.0-3.0      Warfarin dose taken: Warfarin taken as previously instructed    Diet: No new diet changes affecting INR    Medication changes/ interactions: 3/16 Started rifampin x 3 months (requires significantly higher dose of warfarin d/t interaction)     Previous INR: Subtherapeutic     S/S of bleeding or thromboembolism: No    New injury or illness: No    Upcoming surgery, procedure or cardioversion: No    Additional findings: Will discontinue lovenox at this time.      PLAN:    Spoke with Fausto regarding INR result and instructed:     Warfarin Dosing Instructions: 25 mg M/W/Sat and 20 mg ROW. Dosing plan discussed with Hollie Whitaker, PharmD.    Instructed patient to follow up no later than: 4 days    Lab visit scheduled    Education provided: None required      Ralph verbalizes understanding and agrees to warfarin dosing plan.    Instructed to call the Anticoagulation Clinic for any changes, questions or concerns. (#[phone])        OBJECTIVE:  INR   Date Value Ref Range Status   04/03/2020 3.20 (H) 0.86 - 1.14 Final     Comment:     This test is intended for monitoring Coumadin therapy.  Results are not   accurate in patients with prolonged INR due to factor deficiency.               Anticoagulation Summary  As of 4/3/2020    INR goal:   2.5-3.5   TTR:   74.0 % (10.9 mo)   INR used for dosing:   3.20 (4/3/2020)   Warfarin maintenance plan:   25 mg (5 mg x 5) every Mon, Wed, Sat; 20 mg (5 mg x 4) all other days   Full warfarin instructions:   25 mg every Mon, Wed, Sat; 20 mg all other days   Weekly warfarin total:   155 mg   Plan last modified:   Yolis Domínguez, RN (4/3/2020)   Next INR check:   4/7/2020   Priority:   High   Target end date:   Indefinite    Indications    AF (atrial fibrillation) (H) [I48.91]  S/P mitral valve replacement  [Z95.2]  Long term current use of anticoagulant therapy [Z79.01]             Anticoagulation Episode Summary     INR check location:       Preferred lab:   EXTERNAL LAB    Send INR reminders to:   SHANNA PERRY    Comments:   3/16 Started rifampin x 3 months (requires significantly higher dose of warfarin d/t interaction)       Anticoagulation Care Providers     Provider Role Specialty Phone number    Jodi Flower Mai, MD [facility] Internal Medicine [phone]

## 2020-04-06 ENCOUNTER — MYC MEDICAL ADVICE (OUTPATIENT)
Dept: PEDIATRICS | Facility: CLINIC | Age: 80
End: 2020-04-06

## 2020-04-07 ENCOUNTER — VIRTUAL VISIT (OUTPATIENT)
Dept: PEDIATRICS | Facility: CLINIC | Age: 80
End: 2020-04-07
Payer: MEDICARE

## 2020-04-07 ENCOUNTER — TELEPHONE (OUTPATIENT)
Dept: PEDIATRICS | Facility: CLINIC | Age: 80
End: 2020-04-07

## 2020-04-07 DIAGNOSIS — Z95.2 S/P MITRAL VALVE REPLACEMENT: ICD-10-CM

## 2020-04-07 DIAGNOSIS — M62.89 MUSCLE STIFFNESS: Primary | ICD-10-CM

## 2020-04-07 DIAGNOSIS — I48.3 TYPICAL ATRIAL FLUTTER (H): ICD-10-CM

## 2020-04-07 DIAGNOSIS — I48.91 AF (ATRIAL FIBRILLATION) (H): ICD-10-CM

## 2020-04-07 DIAGNOSIS — Z79.01 LONG TERM CURRENT USE OF ANTICOAGULANT THERAPY: ICD-10-CM

## 2020-04-07 DIAGNOSIS — I38 VALVULAR HEART DISEASE: ICD-10-CM

## 2020-04-07 DIAGNOSIS — M00.862 ARTHRITIS OF LEFT KNEE DUE TO OTHER BACTERIA (H): ICD-10-CM

## 2020-04-07 LAB
CAPILLARY BLOOD COLLECTION: NORMAL
INR PPP: 1.9 (ref 0.86–1.14)

## 2020-04-07 PROCEDURE — 85610 PROTHROMBIN TIME: CPT | Performed by: INTERNAL MEDICINE

## 2020-04-07 PROCEDURE — 36416 COLLJ CAPILLARY BLOOD SPEC: CPT | Performed by: INTERNAL MEDICINE

## 2020-04-07 PROCEDURE — 99442 ZZC PHYSICIAN TELEPHONE EVALUATION 11-20 MIN: CPT | Performed by: INTERNAL MEDICINE

## 2020-04-07 RX ORDER — RIFAMPIN 300 MG/1
CAPSULE ORAL
COMMUNITY
Start: 2020-03-17 | End: 2020-06-12

## 2020-04-07 RX ORDER — METHOCARBAMOL 750 MG/1
750 TABLET, FILM COATED ORAL 4 TIMES DAILY PRN
Qty: 30 TABLET | Refills: 1 | Status: SHIPPED | OUTPATIENT
Start: 2020-04-07 | End: 2020-05-19

## 2020-04-07 NOTE — PROGRESS NOTES
"Subjective     Fausto Farr is a 79 year old male who is being evaluated via a billable telephone visit.      The patient has been notified of following:     \"This telephone visit will be conducted via a call between you and your physician/provider. We have found that certain health care needs can be provided without the need for a physical exam.  This service lets us provide the care you need with a short phone conversation.  If a prescription is necessary we can send it directly to your pharmacy.  If lab work is needed we can place an order for that and you can then stop by our lab to have the test done at a later time.    If during the course of the call the physician/provider feels a telephone visit is not appropriate, you will not be charged for this service.\"     Patient has given verbal consent for Telephone visit?  Yes    Fausto Farr complains of   Chief Complaint   Patient presents with     RECHECK       ALLERGIES  Bees    Patient would like to follow up on knee surgeries, has been doing physical therapy for about 2 weeks now. Patient would like to know if he can go on muscle relaxer's due to achy muscles when doing PT.     Discontinued IV antibiotics last week and removed PICC line.  Now on oral antibiotics for 3 more months.  Cardiology procedures postponed another couple of months.  Has appointment in a few months.  Will have to wear another heart monitor for another 30 days.  Has not had any light headedness.    Has PT nurse to help with home PT.  Reports stiffness and pain.  Still has decreased ROM.            Reviewed and updated as needed this visit by Provider         Review of Systems   All other systems on a 10-point review are negative, unless otherwise noted in HPI           Objective   Reported vitals:  There were no vitals taken for this visit.     Psych: Alert and oriented times 3; coherent speech, normal   rate and volume, able to articulate logical thoughts, able   to abstract " reason, no tangential thoughts, no hallucinations   or delusions      Diagnostic Test Results:  Labs reviewed in Epic        Assessment/Plan:  1. Muscle stiffness  Left knee is still stiff due to immobility.  Taking tylenol only.  Is rehabing with home PT.  Wondering if we can try a muscle relaxer (had this in hospital and was helpful).  Will initiate trial - I did ask him to discontinue if minimal benefit is seen due to polypharmacy.  - methocarbamol (ROBAXIN) 750 MG tablet; Take 1 tablet (750 mg) by mouth 4 times daily as needed for muscle spasms  Dispense: 30 tablet; Refill: 1    2. Valvular heart disease  Continues to follow with INR clinic.    3. Typical atrial flutter (H)  With new heart blocks - pending pace maker placement after completes full course of antibiotics.  This has been postponed at least another 3 months.  Cardiology appt also postponed a couple of months.  In mean time, will wear another 30 day monitor.    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  Now on rifampin and augmentin x at least 3 months.  ID note appreciated.  INR clinic to monitor warfarin closely due to interaction with rifampin.      Return in about 3 months (around 7/7/2020) for appointment already scheduled.       Phone call duration:  11 minutes    Chris Flower MD

## 2020-04-07 NOTE — TELEPHONE ENCOUNTER
ANTICOAGULATION MANAGEMENT     Patient Name:  Fausto Farr  Date:  2020    ASSESSMENT /SUBJECTIVE:    Today's INR result of 1.9 is subtherapeutic. Goal INR of 2.5-3.5      Warfarin dose taken: Warfarin taken as previously instructed    Diet: No new diet changes affecting INR    Medication changes/ interactions: No new medications/supplements affecting INR    Previous INR: Therapeutic     S/S of bleeding or thromboembolism: No    New injury or illness: No    Upcoming surgery, procedure or cardioversion: No    Additional findings: Patient is now done with IV antibiotics and is taking oral Augmentin for 3 months.       PLAN:    Spoke with Flaquita Rios Pharmacist and reviewed follow up plan.     Spoke with Fausto regarding INR result and instructed:     Warfarin Dosing Instructions: 40 mg then change your warfarin dose to 30 mg Mon and 25 mg ROW, Patient has a couple Lovenox syringes left and will start using them tonight every 12 hours until gone.     Instructed patient to follow up no later than: 3 days  Lab visit scheduled    Education provided: Importance of following up for INR monitoring at instructed interval, Importance of taking warfarin as instructed and Potential interaction between warfarin and rifampin      Ralph verbalizes understanding and agrees to warfarin dosing plan.    Instructed to call the Anticoagulation Clinic for any changes, questions or concerns. (#[phone])        OBJECTIVE:  INR   Date Value Ref Range Status   2020 1.90 (H) 0.86 - 1.14 Final     Comment:     This test is intended for monitoring Coumadin therapy.  Results are not   accurate in patients with prolonged INR due to factor deficiency.               Anticoagulation Summary  As of 2020    INR goal:   2.5-3.5   TTR:   73.4 % (10.9 mo)   INR used for dosin.90! (2020)   Warfarin maintenance plan:   30 mg (5 mg x 6) every Mon; 25 mg (5 mg x 5) all other days   Full warfarin instructions:   : 40  mg; Otherwise 30 mg every Mon; 25 mg all other days   Weekly warfarin total:   180 mg   Plan last modified:   Kathy Gaffney, JUSTIN (4/7/2020)   Next INR check:   4/10/2020   Priority:   High   Target end date:   Indefinite    Indications    AF (atrial fibrillation) (H) [I48.91]  S/P mitral valve replacement [Z95.2]  Long term current use of anticoagulant therapy [Z79.01]             Anticoagulation Episode Summary     INR check location:       Preferred lab:   EXTERNAL LAB    Send INR reminders to:   SHANNA PERRY    Comments:   3/16 Started rifampin x 3 months (requires significantly higher dose of warfarin d/t interaction)       Anticoagulation Care Providers     Provider Role Specialty Phone number    Jodi Flower Mai, MD [facility] [phone]           Kathy Gaffney RN - [facility]

## 2020-04-10 ENCOUNTER — TELEPHONE (OUTPATIENT)
Dept: PEDIATRICS | Facility: CLINIC | Age: 80
End: 2020-04-10

## 2020-04-10 DIAGNOSIS — Z95.2 S/P MITRAL VALVE REPLACEMENT: ICD-10-CM

## 2020-04-10 DIAGNOSIS — I48.91 AF (ATRIAL FIBRILLATION) (H): ICD-10-CM

## 2020-04-10 DIAGNOSIS — Z79.01 LONG TERM CURRENT USE OF ANTICOAGULANT THERAPY: ICD-10-CM

## 2020-04-10 LAB
CAPILLARY BLOOD COLLECTION: NORMAL
INR PPP: 3.4 (ref 0.86–1.14)

## 2020-04-10 PROCEDURE — 85610 PROTHROMBIN TIME: CPT | Performed by: INTERNAL MEDICINE

## 2020-04-10 PROCEDURE — 36416 COLLJ CAPILLARY BLOOD SPEC: CPT | Performed by: INTERNAL MEDICINE

## 2020-04-10 NOTE — TELEPHONE ENCOUNTER
ANTICOAGULATION MANAGEMENT     Patient Name:  Fausto Farr  Date:  4/10/2020    ASSESSMENT /SUBJECTIVE:    Today's INR result of 3.40 is therapeutic. Goal INR of 2.5-3.5      Warfarin dose taken: Warfarin taken as previously instructed    Diet: No new diet changes affecting INR    Medication changes/ interactions: Interaction between rifampin, augmentin and warfarin may be affecting INR, Patient started oral rifampin on 3/17/20    Previous INR: Subtherapeutic     S/S of bleeding or thromboembolism: No    New injury or illness: Yes: patient reporting diarrhea with use of antibiotics, reports taking 3 antidiarrheal per day    Upcoming surgery, procedure or cardioversion: No    Additional findings: None      PLAN:    Spoke with Fausto regarding INR result and instructed:     Warfarin Dosing Instructions: Change your warfarin dose to 25 mg daily, huddled with Flaquita Rios pharmacist to confirm dosing plan as off protocol due to rifampin use.    Instructed patient to follow up no later than: [id number]  Lab visit scheduled    Education provided: Potential interaction between warfarin and augmentin/rifampin, Monitoring for bleeding signs and symptoms, When to seek medical attention/emergency care and Importance of notifying clinic for diarrhea, nausea/vomiting, reduced intake and/or illness      Ralph verbalizes understanding and agrees to warfarin dosing plan.    Instructed to call the Anticoagulation Clinic for any changes, questions or concerns. (#[phone])        OBJECTIVE:  INR   Date Value Ref Range Status   04/10/2020 3.40 (H) 0.86 - 1.14 Final     Comment:     This test is intended for monitoring Coumadin therapy.  Results are not   accurate in patients with prolonged INR due to factor deficiency.               Anticoagulation Summary  As of 4/10/2020    INR goal:   2.5-3.5   TTR:   73.0 % (10.9 mo)   INR used for dosing:   3.40 (4/10/2020)   Warfarin maintenance plan:   25 mg (5 mg x 5) every day   Full  warfarin instructions:   25 mg every day   Weekly warfarin total:   175 mg   Plan last modified:   Nemo Dobson RN (4/10/2020)   Next INR check:   4/14/2020   Priority:   High   Target end date:   Indefinite    Indications    AF (atrial fibrillation) (H) [I48.91]  S/P mitral valve replacement [Z95.2]  Long term current use of anticoagulant therapy [Z79.01]             Anticoagulation Episode Summary     INR check location:       Preferred lab:   EXTERNAL LAB    Send INR reminders to:   SHANNA PERRY    Comments:   3/16 Started rifampin x 3 months (requires significantly higher dose of warfarin d/t interaction)       Anticoagulation Care Providers     Provider Role Specialty Phone number    Jodi Flower Mai, MD [facility] Internal Medicine [phone]

## 2020-04-14 ENCOUNTER — TELEPHONE (OUTPATIENT)
Dept: PEDIATRICS | Facility: CLINIC | Age: 80
End: 2020-04-14

## 2020-04-14 DIAGNOSIS — I48.91 AF (ATRIAL FIBRILLATION) (H): ICD-10-CM

## 2020-04-14 DIAGNOSIS — Z95.2 S/P MITRAL VALVE REPLACEMENT: ICD-10-CM

## 2020-04-14 DIAGNOSIS — Z79.01 LONG TERM CURRENT USE OF ANTICOAGULANT THERAPY: ICD-10-CM

## 2020-04-14 LAB — INR PPP: 2.6 (ref 0.86–1.14)

## 2020-04-14 PROCEDURE — 36416 COLLJ CAPILLARY BLOOD SPEC: CPT | Performed by: INTERNAL MEDICINE

## 2020-04-14 PROCEDURE — 85610 PROTHROMBIN TIME: CPT | Performed by: INTERNAL MEDICINE

## 2020-04-14 NOTE — TELEPHONE ENCOUNTER
ANTICOAGULATION MANAGEMENT     Patient Name:  Fausto Farr  Date:  2020    ASSESSMENT /SUBJECTIVE:    Today's INR result of 2.60 is therapeutic. Goal INR of 2.5-3.5      Warfarin dose taken: Warfarin taken as previously instructed    Diet: No new diet changes affecting INR    Medication changes/ interactions: No new medications/supplements affecting INR    Previous INR: Therapeutic     S/S of bleeding or thromboembolism: No    New injury or illness: No    Upcoming surgery, procedure or cardioversion: No    Additional findings: None      PLAN:    Spoke with Fausto regarding INR result and instructed:     Warfarin Dosing Instructions: Change your warfarin dose to 30 mg on tues and 25 mg all other days    Instructed patient to follow up no later than: 1 week  Lab visit scheduled    Education provided: None required      Ralph verbalizes understanding and agrees to warfarin dosing plan.    Instructed to call the Anticoagulation Clinic for any changes, questions or concerns. (#[phone])        OBJECTIVE:  INR   Date Value Ref Range Status   2020 2.60 (H) 0.86 - 1.14 Final     Comment:     This test is intended for monitoring Coumadin therapy.  Results are not   accurate in patients with prolonged INR due to factor deficiency.               Anticoagulation Summary  As of 2020    INR goal:   2.5-3.5   TTR:   73.0 % (10.9 mo)   INR used for dosin.60 (2020)   Warfarin maintenance plan:   25 mg (5 mg x 5) every day   Full warfarin instructions:   25 mg every day   Weekly warfarin total:   175 mg   Plan last modified:   Nemo Dobson RN (4/10/2020)   Next INR check:      Priority:   High   Target end date:   Indefinite    Indications    AF (atrial fibrillation) (H) [I48.91]  S/P mitral valve replacement [Z95.2]  Long term current use of anticoagulant therapy [Z79.01]             Anticoagulation Episode Summary     INR check location:       Preferred lab:   EXTERNAL LAB    Send INR  reminders to:   SHANNA PERRY    Comments:   3/16 Started rifampin x 3 months (requires significantly higher dose of warfarin d/t interaction)       Anticoagulation Care Providers     Provider Role Specialty Phone number    Jodi Flower Mai, MD [facility] Internal [facility] [phone]

## 2020-04-21 ENCOUNTER — TELEPHONE (OUTPATIENT)
Dept: PEDIATRICS | Facility: CLINIC | Age: 80
End: 2020-04-21

## 2020-04-21 DIAGNOSIS — Z95.2 S/P MITRAL VALVE REPLACEMENT: ICD-10-CM

## 2020-04-21 DIAGNOSIS — Z79.01 LONG TERM CURRENT USE OF ANTICOAGULANT THERAPY: ICD-10-CM

## 2020-04-21 DIAGNOSIS — I48.91 AF (ATRIAL FIBRILLATION) (H): ICD-10-CM

## 2020-04-21 LAB
CAPILLARY BLOOD COLLECTION: NORMAL
INR PPP: 4.3 (ref 0.86–1.14)

## 2020-04-21 PROCEDURE — 85610 PROTHROMBIN TIME: CPT | Performed by: INTERNAL MEDICINE

## 2020-04-21 PROCEDURE — 36416 COLLJ CAPILLARY BLOOD SPEC: CPT | Performed by: INTERNAL MEDICINE

## 2020-04-21 NOTE — TELEPHONE ENCOUNTER
ANTICOAGULATION MANAGEMENT     Patient Name:  Fausto Farr  Date:  2020    ASSESSMENT /SUBJECTIVE:    Today's INR result of 4.3 is supratherapeutic. Goal INR of 2.5-3.5      Warfarin dose taken: Warfarin taken as previously instructed    Diet: No new diet changes affecting INR    Medication changes/ interactions: Rifampin started 3/17 x 3 months. Augmentin started 3/17 x 3 months. Both have interactions with warfarin    Previous INR: Therapeutic     S/S of bleeding or thromboembolism: No    New injury or illness: No    Upcoming surgery, procedure or cardioversion: No    Additional findings: patient states he still continues to have issues with diarrhea however it has improved with taking anti diarrheal medication      PLAN:    Spoke with Fausto regarding INR result and instructed:     Warfarin Dosing Instructions: Change your warfarin dose to take one time lower dose of 10mg today  Then take 25mg wed/thurs    Discussed with pharmacist Flaquita Rios  Instructed patient to follow up no later than:   Lab visit scheduled    Education provided: Target INR goal and significance of current INR result and Importance of therapeutic range      Fausto verbalizes understanding and agrees to warfarin dosing plan.    Instructed to call the Anticoagulation Clinic for any changes, questions or concerns. (#[phone])        OBJECTIVE:  INR   Date Value Ref Range Status   2020 4.30 (H) 0.86 - 1.14 Final     Comment:     This test is intended for monitoring Coumadin therapy.  Results are not   accurate in patients with prolonged INR due to factor deficiency.               Anticoagulation Summary  As of 2020    INR goal:   2.5-3.5   TTR:   72.0 % (10.9 mo)   INR used for dosin.30! (2020)   Warfarin maintenance plan:   25 mg (5 mg x 5) every day   Full warfarin instructions:   : 10 mg; Otherwise 25 mg every day   Weekly warfarin total:   175 mg   Plan last modified:   Yolis Dominguez,  RN (4/21/2020)   Next INR check:   4/24/2020   Priority:   High   Target end date:   Indefinite    Indications    AF (atrial fibrillation) (H) [I48.91]  S/P mitral valve replacement [Z95.2]  Long term current use of anticoagulant therapy [Z79.01]             Anticoagulation Episode Summary     INR check location:       Preferred lab:   EXTERNAL LAB    Send INR reminders to:   SHANNA PERRY    Comments:   3/16 Started rifampin x 3 months (requires significantly higher dose of warfarin d/t interaction)       Anticoagulation Care Providers     Provider Role Specialty Phone number    Jodi Flower Mai, MD [facility] Internal Medicine [phone]

## 2020-04-21 NOTE — TELEPHONE ENCOUNTER
Left message for patient to call back anticoag nurse to verify dosing and any changes in regards to INR result from today. Need to verify if patient is still on rifampin, is having diarrhea still, and if on any other antibiotics.

## 2020-04-24 ENCOUNTER — TELEPHONE (OUTPATIENT)
Dept: PEDIATRICS | Facility: CLINIC | Age: 80
End: 2020-04-24

## 2020-04-24 DIAGNOSIS — I48.91 AF (ATRIAL FIBRILLATION) (H): ICD-10-CM

## 2020-04-24 DIAGNOSIS — Z95.2 S/P MITRAL VALVE REPLACEMENT: ICD-10-CM

## 2020-04-24 DIAGNOSIS — Z79.01 LONG TERM CURRENT USE OF ANTICOAGULANT THERAPY: ICD-10-CM

## 2020-04-24 LAB
CAPILLARY BLOOD COLLECTION: NORMAL
INR PPP: 2.3 (ref 0.86–1.14)

## 2020-04-24 PROCEDURE — 36416 COLLJ CAPILLARY BLOOD SPEC: CPT | Performed by: INTERNAL MEDICINE

## 2020-04-24 PROCEDURE — 85610 PROTHROMBIN TIME: CPT | Performed by: INTERNAL MEDICINE

## 2020-04-24 NOTE — TELEPHONE ENCOUNTER
ANTICOAGULATION MANAGEMENT     Patient Name:  Fausto Farr  Date:  2020    ASSESSMENT /SUBJECTIVE:    Today's INR result of 4.30 is supratherapeutic. Goal INR of 2.5-3.5      Warfarin dose taken: Warfarin taken as previously instructed    Diet: Increased greens/vitamin K intake may be affecting INR    Medication changes/ interactions: Interaction between Rifampin and augmentin and warfarin may be affecting INR    Previous INR: Supratherapeutic     S/S of bleeding or thromboembolism: No    New injury or illness: No    Upcoming surgery, procedure or cardioversion: No    Additional findings: Still having diarrhea    PLAN:    Spoke with Fausto regarding INR result and instructed:     Warfarin Dosing Instructions: Continue your current warfarin dose 25 mg daily    Instructed patient to follow up no later than: 301700  Lab visit scheduled    Education provided: Monitoring for bleeding signs and symptoms      Ralph verbalizes understanding and agrees to warfarin dosing plan.    Instructed to call the Anticoagulation Clinic for any changes, questions or concerns. (#[phone])        OBJECTIVE:  INR   Date Value Ref Range Status   2020 2.30 (H) 0.86 - 1.14 Final     Comment:     This test is intended for monitoring Coumadin therapy.  Results are not   accurate in patients with prolonged INR due to factor deficiency.               Anticoagulation Summary  As of 2020    INR goal:   2.5-3.5   TTR:   71.6 % (10.9 mo)   INR used for dosin.30! (2020)   Warfarin maintenance plan:   25 mg (5 mg x 5) every day   Full warfarin instructions:   25 mg every day   Weekly warfarin total:   175 mg   Plan last modified:   Yolis Dominguez RN (2020)   Next INR check:   2020   Priority:   High   Target end date:   Indefinite    Indications    AF (atrial fibrillation) (H) [I48.91]  S/P mitral valve replacement [Z95.2]  Long term current use of anticoagulant therapy [Z79.01]              Anticoagulation Episode Summary     INR check location:       Preferred lab:   EXTERNAL LAB    Send INR reminders to:   SHANNA PERRY    Comments:   3/16 Started rifampin x 3 months (requires significantly higher dose of warfarin d/t interaction)       Anticoagulation Care Providers     Provider Role Specialty Phone number    Jodi Flower Mai, MD [facility] Internal Medicine [phone]

## 2020-04-29 ENCOUNTER — TELEPHONE (OUTPATIENT)
Dept: PEDIATRICS | Facility: CLINIC | Age: 80
End: 2020-04-29

## 2020-04-29 DIAGNOSIS — Z95.2 S/P MITRAL VALVE REPLACEMENT: ICD-10-CM

## 2020-04-29 DIAGNOSIS — I48.91 AF (ATRIAL FIBRILLATION) (H): ICD-10-CM

## 2020-04-29 DIAGNOSIS — Z79.01 LONG TERM CURRENT USE OF ANTICOAGULANT THERAPY: ICD-10-CM

## 2020-04-29 LAB
CAPILLARY BLOOD COLLECTION: NORMAL
INR PPP: 3.5 (ref 0.86–1.14)

## 2020-04-29 PROCEDURE — 36416 COLLJ CAPILLARY BLOOD SPEC: CPT | Performed by: INTERNAL MEDICINE

## 2020-04-29 PROCEDURE — 85610 PROTHROMBIN TIME: CPT | Performed by: INTERNAL MEDICINE

## 2020-04-29 NOTE — TELEPHONE ENCOUNTER
ANTICOAGULATION MANAGEMENT     Patient Name:  Fausto Farr  Date:  4/29/2020    ASSESSMENT /SUBJECTIVE:    Today's INR result of 3.50 is therapeutic. Goal INR of 2.5-3.5      Warfarin dose taken: Warfarin taken as previously instructed    Diet: No new diet changes affecting INR    Medication changes/ interactions: Interaction between rifampin and warfarin may be affecting INR    Previous INR: Therapeutic     S/S of bleeding or thromboembolism: No    New injury or illness: No    Upcoming surgery, procedure or cardioversion: No    Additional findings: Diarrhea      PLAN:    Spoke with Fausto regarding INR result and instructed:     Warfarin Dosing Instructions: Change your warfarin dose to 20 mg on wednesday and 25 mg all other days, decreased just slightly due to ongoing diarrhea    Instructed patient to follow up no later than: 1 week  Lab visit scheduled    Education provided: Monitoring for bleeding signs and symptoms      Ralph verbalizes understanding and agrees to warfarin dosing plan.    Instructed to call the Anticoagulation Clinic for any changes, questions or concerns. (#[phone])        OBJECTIVE:  INR   Date Value Ref Range Status   04/29/2020 3.50 (H) 0.86 - 1.14 Final     Comment:     This test is intended for monitoring Coumadin therapy.  Results are not   accurate in patients with prolonged INR due to factor deficiency.               Anticoagulation Summary  As of 4/29/2020    INR goal:   2.5-3.5   TTR:   71.3 % (10.9 mo)   INR used for dosing:   3.50 (4/29/2020)   Warfarin maintenance plan:   20 mg (5 mg x 4) every Wed; 25 mg (5 mg x 5) all other days   Full warfarin instructions:   20 mg every Wed; 25 mg all other days   Weekly warfarin total:   170 mg   Plan last modified:   Nemo Dobson RN (4/29/2020)   Next INR check:   5/6/2020   Priority:   High   Target end date:   Indefinite    Indications    AF (atrial fibrillation) (H) [I48.91]  S/P mitral valve replacement [Z95.2]  Long term  current use of anticoagulant therapy [Z79.01]             Anticoagulation Episode Summary     INR check location:       Preferred lab:   EXTERNAL LAB    Send INR reminders to:   SHANNA PERRY    Comments:   3/16 Started rifampin x 3 months (requires significantly higher dose of warfarin d/t interaction)       Anticoagulation Care Providers     Provider Role Specialty Phone number    Jodi Flower Mai, MD [facility] Internal Medicine [phone]

## 2020-05-06 ENCOUNTER — TELEPHONE (OUTPATIENT)
Dept: PEDIATRICS | Facility: CLINIC | Age: 80
End: 2020-05-06

## 2020-05-06 DIAGNOSIS — Z95.2 S/P MITRAL VALVE REPLACEMENT: ICD-10-CM

## 2020-05-06 DIAGNOSIS — I48.91 AF (ATRIAL FIBRILLATION) (H): ICD-10-CM

## 2020-05-06 DIAGNOSIS — I48.91 ATRIAL FIBRILLATION, UNSPECIFIED TYPE (H): ICD-10-CM

## 2020-05-06 DIAGNOSIS — Z79.01 LONG TERM CURRENT USE OF ANTICOAGULANT THERAPY: ICD-10-CM

## 2020-05-06 LAB
CAPILLARY BLOOD COLLECTION: NORMAL
INR PPP: 1.3 (ref 0.86–1.14)

## 2020-05-06 PROCEDURE — 36416 COLLJ CAPILLARY BLOOD SPEC: CPT | Performed by: INTERNAL MEDICINE

## 2020-05-06 PROCEDURE — 85610 PROTHROMBIN TIME: CPT | Performed by: INTERNAL MEDICINE

## 2020-05-06 NOTE — TELEPHONE ENCOUNTER
ANTICOAGULATION MANAGEMENT     Patient Name:  Fausto Farr  Date:  5/6/2020    ASSESSMENT /SUBJECTIVE:    Today's INR result of 1.3 is subtherapeutic. Goal INR of 2.5-3.5      Warfarin dose taken: Missed dose(s) may be affecting INR - missed Monday 5/4/2020 dose, took 10mg extra on 5/5/2020    Diet: No new diet changes affecting INR    Medication changes/ interactions: No new medications/supplements affecting INR    Previous INR: Therapeutic     S/S of bleeding or thromboembolism: No    New injury or illness: No    Upcoming surgery, procedure or cardioversion: No    Additional findings: Will be on rifampin until 5/16.       PLAN:    Spoke with Fautso regarding INR result and instructed:     Warfarin Dosing Instructions: 30mg tonight then continue your current warfarin dose of 20mg Wednesday and 25mg AOD    Instructed patient to follow up no later than: Friday May 8th (2 days)  Lab visit scheduled    Education provided: Importance of rechecking INR       Ralph verbalizes understanding and agrees to warfarin dosing plan.    Instructed to call the Anticoagulation Clinic for any changes, questions or concerns. (#[phone])        OBJECTIVE:  INR   Date Value Ref Range Status   05/06/2020 1.30 (H) 0.86 - 1.14 Final     Comment:     This test is intended for monitoring Coumadin therapy.  Results are not   accurate in patients with prolonged INR due to factor deficiency.         Farheen Lopez, RN, BSN, PHN

## 2020-05-08 ENCOUNTER — TELEPHONE (OUTPATIENT)
Dept: PEDIATRICS | Facility: CLINIC | Age: 80
End: 2020-05-08

## 2020-05-08 DIAGNOSIS — I48.91 AF (ATRIAL FIBRILLATION) (H): ICD-10-CM

## 2020-05-08 DIAGNOSIS — Z95.2 S/P MITRAL VALVE REPLACEMENT: ICD-10-CM

## 2020-05-08 DIAGNOSIS — I48.91 ATRIAL FIBRILLATION, UNSPECIFIED TYPE (H): ICD-10-CM

## 2020-05-08 DIAGNOSIS — Z79.01 LONG TERM CURRENT USE OF ANTICOAGULANT THERAPY: ICD-10-CM

## 2020-05-08 LAB
CAPILLARY BLOOD COLLECTION: NORMAL
INR PPP: 2.4 (ref 0.86–1.14)

## 2020-05-08 PROCEDURE — 36416 COLLJ CAPILLARY BLOOD SPEC: CPT | Performed by: INTERNAL MEDICINE

## 2020-05-08 PROCEDURE — 85610 PROTHROMBIN TIME: CPT | Performed by: INTERNAL MEDICINE

## 2020-05-08 NOTE — TELEPHONE ENCOUNTER
Anticoagulation Management    Unable to reach Ralph today.    Today's INR result of 2.40 is subtherapeutic (goal INR of 2.5-3.5).  Result received from: Clinic Lab    Follow up required to confirm warfarin dose taken and assess for changes    [facility]         [facility]ticoagulation clinic to follow up    Nemo Dobson RN

## 2020-05-08 NOTE — TELEPHONE ENCOUNTER
ANTICOAGULATION MANAGEMENT     Patient Name:  Fausto Farr  Date:  2020    ASSESSMENT /SUBJECTIVE:    Today's INR result of 2.40 is subtherapeutic. Goal INR of 2.5-3.5      Warfarin dose taken: Warfarin taken as previously instructed    Diet: No new diet changes affecting INR    Medication changes/ interactions: No new medications/supplements affecting INR    Previous INR: Subtherapeutic     S/S of bleeding or thromboembolism: No    New injury or illness: No    Upcoming surgery, procedure or cardioversion: No    Additional findings: Patient reporting diarrhea      PLAN:    Spoke with Fausto regarding INR result and instructed:     Warfarin Dosing Instructions: Continue your current warfarin dose 20 mg on wed and 25 mg all other days    Instructed patient to follow up no later than: 20  Lab visit scheduled    Education provided: Monitoring for clotting signs and symptoms      Ralph verbalizes understanding and agrees to warfarin dosing plan.    Instructed to call the Anticoagulation Clinic for any changes, questions or concerns. (#[phone])        OBJECTIVE:  INR   Date Value Ref Range Status   2020 2.40 (H) 0.86 - 1.14 Final     Comment:     This test is intended for monitoring Coumadin therapy.  Results are not   accurate in patients with prolonged INR due to factor deficiency.               Anticoagulation Summary  As of 2020    INR goal:   2.5-3.5   TTR:   69.5 % (10.9 mo)   INR used for dosin.40! (2020)   Warfarin maintenance plan:   20 mg (5 mg x 4) every Wed; 25 mg (5 mg x 5) all other days   Full warfarin instructions:   20 mg every Wed; 25 mg all other days   Weekly warfarin total:   170 mg   Plan last modified:   Nemo Dobson RN (2020)   Next INR check:   2020   Priority:   Critical   Target end date:   Indefinite    Indications    AF (atrial fibrillation) (H) [I48.91]  S/P mitral valve replacement [Z95.2]  Long term current use of anticoagulant therapy  [Z79.01]             Anticoagulation Episode Summary     INR check location:       Preferred lab:   EXTERNAL LAB    Send INR reminders to:   SHANNA PERRY    Comments:   3/16 Started rifampin x 3 months (requires significantly higher dose of warfarin d/t interaction)       Anticoagulation Care Providers     Provider Role Specialty Phone number    Jodi Flower Mai, MD [facility] Internal Medicine [phone]

## 2020-05-11 ENCOUNTER — TELEPHONE (OUTPATIENT)
Dept: PEDIATRICS | Facility: CLINIC | Age: 80
End: 2020-05-11

## 2020-05-11 DIAGNOSIS — Z79.01 LONG TERM CURRENT USE OF ANTICOAGULANT THERAPY: ICD-10-CM

## 2020-05-11 DIAGNOSIS — Z95.2 S/P MITRAL VALVE REPLACEMENT: ICD-10-CM

## 2020-05-11 DIAGNOSIS — I48.91 ATRIAL FIBRILLATION, UNSPECIFIED TYPE (H): ICD-10-CM

## 2020-05-11 DIAGNOSIS — I48.91 AF (ATRIAL FIBRILLATION) (H): ICD-10-CM

## 2020-05-11 LAB
CAPILLARY BLOOD COLLECTION: NORMAL
INR PPP: 2.7 (ref 0.86–1.14)

## 2020-05-11 PROCEDURE — 85610 PROTHROMBIN TIME: CPT | Performed by: INTERNAL MEDICINE

## 2020-05-11 PROCEDURE — 36416 COLLJ CAPILLARY BLOOD SPEC: CPT | Performed by: INTERNAL MEDICINE

## 2020-05-11 NOTE — TELEPHONE ENCOUNTER
ANTICOAGULATION MANAGEMENT     Patient Name:  Fausto Farr  Date:  2020    ASSESSMENT /SUBJECTIVE:    Today's INR result of 2.7 is therapeutic. Goal INR of 2.5-3.5      Warfarin dose taken: Warfarin taken as previously instructed    Diet: No new diet changes affecting INR    Medication changes/ interactions: patient continues on rifampin and augmentin since 3/17, both can affect INR    Previous INR: Subtherapeutic     S/S of bleeding or thromboembolism: No    New injury or illness: No    Upcoming surgery, procedure or cardioversion: No    Additional findings: continues to have diarrhea intermittently, this is unchanged and not a new symptom      PLAN:    Spoke with Fausto regarding INR result and instructed:     Warfarin Dosing Instructions: Continue your currently planned warfarin dose     20 mg every Wed; 25 mg all other days         Instructed patient to follow up no later than: 7-10 days  Lab visit scheduled    Education provided: Target INR goal and significance of current INR result, Importance of therapeutic range, Potential interaction between warfarin and rifampin and augmentin and When to seek medical attention/emergency care      Ralph verbalizes understanding and agrees to warfarin dosing plan.    Instructed to call the Anticoagulation Clinic for any changes, questions or concerns. (#[phone])        OBJECTIVE:  INR   Date Value Ref Range Status   2020 2.70 (H) 0.86 - 1.14 Final     Comment:     This test is intended for monitoring Coumadin therapy.  Results are not   accurate in patients with prolonged INR due to factor deficiency.               Anticoagulation Summary  As of 2020    INR goal:   2.5-3.5   TTR:   69.2 % (10.9 mo)   INR used for dosin.70 (2020)   Warfarin maintenance plan:   20 mg (5 mg x 4) every Wed; 25 mg (5 mg x 5) all other days   Full warfarin instructions:   20 mg every Wed; 25 mg all other days   Weekly warfarin total:   170 mg   Plan last  modified:   Nemo Dobson RN (4/29/2020)   Next INR check:   5/20/2020   Priority:   High   Target end date:   Indefinite    Indications    AF (atrial fibrillation) (H) [I48.91]  S/P mitral valve replacement [Z95.2]  Long term current use of anticoagulant therapy [Z79.01]             Anticoagulation Episode Summary     INR check location:       Preferred lab:   EXTERNAL LAB    Send INR reminders to:   SHANNA PERRY    Comments:   3/16 Started rifampin x 3 months (requires significantly higher dose of warfarin d/t interaction)       Anticoagulation Care Providers     Provider Role Specialty Phone number    Jodi Flower Mai, MD [facility] Internal Medicine [phone]

## 2020-05-13 ENCOUNTER — TELEPHONE (OUTPATIENT)
Dept: PEDIATRICS | Facility: CLINIC | Age: 80
End: 2020-05-13

## 2020-05-13 NOTE — TELEPHONE ENCOUNTER
Medication Question or Refill  Who is calling: Ralph  What medication are you calling about (include dose and sig)?: Warfarin 5mg would also like a possible increase in dose - is taking double the dose.  Controlled Substance Agreement on file: No  Who prescribed the medication?: Jodi Flower   Do you need a refill? Yes:   When did you use the medication last? Today  Patient offered an appointment? No  Do you have any questions or concerns?  Yes: Dosage increase  Requested Pharmacy: [facility]   Okay to leave a detailed message?: Yes at Home number on file [phone] (home)

## 2020-05-14 DIAGNOSIS — I48.19 PERSISTENT ATRIAL FIBRILLATION (H): ICD-10-CM

## 2020-05-14 DIAGNOSIS — I48.91 AF (ATRIAL FIBRILLATION) (H): ICD-10-CM

## 2020-05-14 DIAGNOSIS — Z79.01 LONG TERM CURRENT USE OF ANTICOAGULANT THERAPY: ICD-10-CM

## 2020-05-14 DIAGNOSIS — Z95.2 S/P MITRAL VALVE REPLACEMENT: ICD-10-CM

## 2020-05-14 RX ORDER — WARFARIN SODIUM 10 MG/1
TABLET ORAL
Qty: 215 TABLET | Refills: 0 | Status: SHIPPED | OUTPATIENT
Start: 2020-05-14 | End: 2020-05-19

## 2020-05-14 NOTE — TELEPHONE ENCOUNTER
Pt is managed via [facility] team. Will send to them first to call the Pt and discuss.     Staci Davis, RN   [facility] -- Triage Nurse

## 2020-05-18 DIAGNOSIS — Z95.2 S/P MITRAL VALVE REPLACEMENT: ICD-10-CM

## 2020-05-18 DIAGNOSIS — I48.19 PERSISTENT ATRIAL FIBRILLATION (H): ICD-10-CM

## 2020-05-18 DIAGNOSIS — Z79.01 LONG TERM CURRENT USE OF ANTICOAGULANT THERAPY: ICD-10-CM

## 2020-05-18 DIAGNOSIS — M62.89 MUSCLE STIFFNESS: ICD-10-CM

## 2020-05-18 DIAGNOSIS — I48.91 AF (ATRIAL FIBRILLATION) (H): ICD-10-CM

## 2020-05-19 RX ORDER — WARFARIN SODIUM 10 MG/1
TABLET ORAL
Qty: 220 TABLET | Refills: 0 | Status: SHIPPED | OUTPATIENT
Start: 2020-05-19 | End: 2021-05-24 | Stop reason: DRUGHIGH

## 2020-05-19 RX ORDER — METHOCARBAMOL 750 MG/1
750 TABLET, FILM COATED ORAL 4 TIMES DAILY PRN
Qty: 30 TABLET | Refills: 1 | Status: SHIPPED | OUTPATIENT
Start: 2020-05-19 | End: 2020-07-24

## 2020-05-19 NOTE — TELEPHONE ENCOUNTER
Called pharmacy. They never got refill prescription from 5/14/2020.  Prescription for warfarin done on 5/14/2020 was local print and not sent to pharmacy.  Prescription rewritten and e-prescribed.    Caryn Campos RN

## 2020-05-20 ENCOUNTER — TELEPHONE (OUTPATIENT)
Dept: PEDIATRICS | Facility: CLINIC | Age: 80
End: 2020-05-20

## 2020-05-20 DIAGNOSIS — Z95.2 S/P MITRAL VALVE REPLACEMENT: ICD-10-CM

## 2020-05-20 DIAGNOSIS — Z79.01 LONG TERM CURRENT USE OF ANTICOAGULANT THERAPY: ICD-10-CM

## 2020-05-20 DIAGNOSIS — I48.0 PAROXYSMAL ATRIAL FIBRILLATION (H): ICD-10-CM

## 2020-05-20 DIAGNOSIS — I48.91 AF (ATRIAL FIBRILLATION) (H): ICD-10-CM

## 2020-05-20 LAB
CAPILLARY BLOOD COLLECTION: NORMAL
INR PPP: 2.6 (ref 0.86–1.14)

## 2020-05-20 PROCEDURE — 36416 COLLJ CAPILLARY BLOOD SPEC: CPT | Performed by: INTERNAL MEDICINE

## 2020-05-20 PROCEDURE — 85610 PROTHROMBIN TIME: CPT | Performed by: INTERNAL MEDICINE

## 2020-05-20 NOTE — TELEPHONE ENCOUNTER
ANTICOAGULATION MANAGEMENT     Patient Name:  Fausto Farr  Date:  2020    ASSESSMENT /SUBJECTIVE:    Today's INR result of 2.6 is therapeutic. Goal INR of 2.5-3.5      Warfarin dose taken: Warfarin taken as previously instructed    Diet: No new diet changes affecting INR    Medication changes/ interactions:  rifampin and augmentin since 3/17 x 3 months    Previous INR: Therapeutic     S/S of bleeding or thromboembolism: No    New injury or illness: No    Upcoming surgery, procedure or cardioversion: No    Additional findings: rifampin and augmentin since 3/17x 3 months      PLAN:    Spoke with Fausto regarding INR result and instructed:     Warfarin Dosing Instructions: Continue your current warfarin dose 25 mg daily    Instructed patient to follow up no later than: 2 weeks  Lab visit scheduled    Education provided: Monitoring for bleeding signs and symptoms and Monitoring for clotting signs and symptoms      Ralph,  verbalizes understanding and agrees to warfarin dosing plan.    Instructed to call the Anticoagulation Clinic for any changes, questions or concerns. (#[phone])        OBJECTIVE:  INR   Date Value Ref Range Status   2020 2.60 (H) 0.86 - 1.14 Final     Comment:     This test is intended for monitoring Coumadin therapy.  Results are not   accurate in patients with prolonged INR due to factor deficiency.               Anticoagulation Summary  As of 2020    INR goal:   2.5-3.5   TTR:   69.2 % (10.9 mo)   INR used for dosin.60 (2020)   Warfarin maintenance plan:   25 mg (5 mg x 5) every day   Full warfarin instructions:   25 mg every day   Weekly warfarin total:   175 mg   Plan last modified:   Aline Beal RN (2020)   Next INR check:   2020   Priority:   High   Target end date:   Indefinite    Indications    AF (atrial fibrillation) (H) [I48.91]  S/P mitral valve replacement [Z95.2]  Long term current use of anticoagulant therapy [Z79.01]              Anticoagulation Episode Summary     INR check location:       Preferred lab:   EXTERNAL LAB    Send INR reminders to:   SHANNA PERRY    Comments:   3/16 Started rifampin x 3 months (requires significantly higher dose of warfarin d/t interaction)       Anticoagulation Care Providers     Provider Role Specialty Phone number    Jodi Flower Mai, MD [facility] Internal Medicine [phone]

## 2020-05-21 NOTE — TELEPHONE ENCOUNTER
Patient left voicemail on RN voicemail.  Patient requested refill of Methocarbamol and Warfarin.    These medications were refilled on 5/19/20, sent to [facility] pharmacy in [facility].     Called patient.  Informed patient of availability of medications.    Patient requested telephone visit with Dr. Flower to discuss cholesterol medications.   Telephone visit scheduled with Dr. Flower for 5/28/20.    Juan Carlos Parmar RN on 5/21/2020 at 1:19 PM

## 2020-05-28 ENCOUNTER — TELEPHONE (OUTPATIENT)
Dept: PEDIATRICS | Facility: CLINIC | Age: 80
End: 2020-05-28

## 2020-05-28 DIAGNOSIS — Z85.46 HISTORY OF PROSTATE CANCER: Primary | ICD-10-CM

## 2020-05-28 DIAGNOSIS — C61 MALIGNANT NEOPLASM OF PROSTATE (H): ICD-10-CM

## 2020-05-28 DIAGNOSIS — Z12.5 ENCOUNTER FOR SCREENING FOR MALIGNANT NEOPLASM OF PROSTATE: ICD-10-CM

## 2020-05-28 DIAGNOSIS — E78.5 HYPERLIPIDEMIA LDL GOAL <100: ICD-10-CM

## 2020-05-28 DIAGNOSIS — C61 PROSTATE CANCER (H): ICD-10-CM

## 2020-05-28 DIAGNOSIS — I10 ESSENTIAL HYPERTENSION, BENIGN: ICD-10-CM

## 2020-05-28 NOTE — TELEPHONE ENCOUNTER
I spoke to patient; patient has upcoming lab appointment for INR and is requesting to have his annual labs at that time including PSA due to hx of prostate and lipids as well as any other labs he may be due for.

## 2020-06-01 ENCOUNTER — ANTICOAGULATION THERAPY VISIT (OUTPATIENT)
Dept: PEDIATRICS | Facility: CLINIC | Age: 80
End: 2020-06-01

## 2020-06-01 DIAGNOSIS — E78.5 HYPERLIPIDEMIA LDL GOAL <100: ICD-10-CM

## 2020-06-01 DIAGNOSIS — I10 ESSENTIAL HYPERTENSION, BENIGN: ICD-10-CM

## 2020-06-01 DIAGNOSIS — I48.91 AF (ATRIAL FIBRILLATION) (H): ICD-10-CM

## 2020-06-01 DIAGNOSIS — C61 MALIGNANT NEOPLASM OF PROSTATE (H): ICD-10-CM

## 2020-06-01 DIAGNOSIS — Z12.5 ENCOUNTER FOR SCREENING FOR MALIGNANT NEOPLASM OF PROSTATE: ICD-10-CM

## 2020-06-01 DIAGNOSIS — I48.0 PAROXYSMAL ATRIAL FIBRILLATION (H): ICD-10-CM

## 2020-06-01 DIAGNOSIS — C61 PROSTATE CANCER (H): ICD-10-CM

## 2020-06-01 DIAGNOSIS — Z95.2 S/P MITRAL VALVE REPLACEMENT: ICD-10-CM

## 2020-06-01 DIAGNOSIS — Z79.01 LONG TERM CURRENT USE OF ANTICOAGULANT THERAPY: ICD-10-CM

## 2020-06-01 LAB
ALBUMIN SERPL-MCNC: 3.4 G/DL (ref 3.4–5)
ALP SERPL-CCNC: 75 U/L (ref 40–150)
ALT SERPL W P-5'-P-CCNC: 16 U/L (ref 0–70)
ANION GAP SERPL CALCULATED.3IONS-SCNC: 4 MMOL/L (ref 3–14)
AST SERPL W P-5'-P-CCNC: 19 U/L (ref 0–45)
BILIRUB SERPL-MCNC: 0.3 MG/DL (ref 0.2–1.3)
BUN SERPL-MCNC: 13 MG/DL (ref 7–30)
CALCIUM SERPL-MCNC: 8.5 MG/DL (ref 8.5–10.1)
CHLORIDE SERPL-SCNC: 106 MMOL/L (ref 94–109)
CHOLEST SERPL-MCNC: 152 MG/DL
CO2 SERPL-SCNC: 26 MMOL/L (ref 20–32)
CREAT SERPL-MCNC: 0.73 MG/DL (ref 0.66–1.25)
GFR SERPL CREATININE-BSD FRML MDRD: 88 ML/MIN/{1.73_M2}
GLUCOSE SERPL-MCNC: 102 MG/DL (ref 70–99)
HDLC SERPL-MCNC: 34 MG/DL
INR PPP: 4.4 (ref 0.86–1.14)
LDLC SERPL CALC-MCNC: 90 MG/DL
NONHDLC SERPL-MCNC: 118 MG/DL
POTASSIUM SERPL-SCNC: 4 MMOL/L (ref 3.4–5.3)
PROT SERPL-MCNC: 8.1 G/DL (ref 6.8–8.8)
PSA SERPL-ACNC: <0.01 UG/L (ref 0–4)
SODIUM SERPL-SCNC: 136 MMOL/L (ref 133–144)
TRIGL SERPL-MCNC: 138 MG/DL

## 2020-06-01 PROCEDURE — G0103 PSA SCREENING: HCPCS | Performed by: INTERNAL MEDICINE

## 2020-06-01 PROCEDURE — 80061 LIPID PANEL: CPT | Performed by: INTERNAL MEDICINE

## 2020-06-01 PROCEDURE — 85610 PROTHROMBIN TIME: CPT | Performed by: INTERNAL MEDICINE

## 2020-06-01 PROCEDURE — 36415 COLL VENOUS BLD VENIPUNCTURE: CPT | Performed by: INTERNAL MEDICINE

## 2020-06-01 PROCEDURE — 80053 COMPREHEN METABOLIC PANEL: CPT | Performed by: INTERNAL MEDICINE

## 2020-06-01 NOTE — RESULT ENCOUNTER NOTE
Dear Ralph,    Your lab results, including electrolytes, kidney and liver function, and PSA are normal.  At this time, I do not recommend any changes to your current plan of care.    Please feel free to call with any questions.      Sincerely,    Chris Flower MD

## 2020-06-01 NOTE — PROGRESS NOTES
ANTICOAGULATION MANAGEMENT     Patient Name:  Fausto Farr  Date:  2020    ASSESSMENT /SUBJECTIVE:    Today's INR result of 4.4 is supratherapeutic. Goal INR of 2.5-3.5      Warfarin dose taken: Warfarin taken as previously instructed    Diet: No new diet changes affecting INR    Medication changes/ interactions: No new medications/supplements affecting INR    Previous INR: Therapeutic     S/S of bleeding or thromboembolism: No    New injury or illness: No    Upcoming surgery, procedure or cardioversion: No    Additional findings: None      PLAN:    Spoke with Fausto regarding INR result and instructed:     Warfarin Dosing Instructions: partial hold today  take 15mg then change your warfarin dose to      20 mg every Mon, Wed, Fri; 25 mg all other days     (8.6% change)    Instructed patient to follow up no later than: 7-10 days  Lab visit scheduled    Education provided: Target INR goal and significance of current INR result      Ralph verbalizes understanding and agrees to warfarin dosing plan.    Instructed to call the Anticoagulation Clinic for any changes, questions or concerns. (#[phone])        OBJECTIVE:  INR   Date Value Ref Range Status   2020 4.40 (H) 0.86 - 1.14 Final     Comment:     This test is intended for monitoring Coumadin therapy.  Results are not   accurate in patients with prolonged INR due to factor deficiency.           No question data found.  Anticoagulation Summary  As of 2020    INR goal:   2.5-3.5   TTR:   67.4 % (10.9 mo)   INR used for dosin.40! (2020)   Warfarin maintenance plan:   20 mg (5 mg x 4) every Mon, Wed, Fri; 25 mg (5 mg x 5) all other days   Full warfarin instructions:   : 15 mg; Otherwise 20 mg every Mon, Wed, Fri; 25 mg all other days   Weekly warfarin total:   160 mg   Plan last modified:   Gina Villagomez RN (2020)   Next INR check:   2020   Priority:   High   Target end date:   Indefinite    Indications    AF  (atrial fibrillation) (H) [I48.91]  S/P mitral valve replacement [Z95.2]  Long term current use of anticoagulant therapy [Z79.01]             Anticoagulation Episode Summary     INR check location:       Preferred lab:   EXTERNAL LAB    Send INR reminders to:   SHANNA DOWELL    Comments:   3/16 Started rifampin x 3 months (requires significantly higher dose of warfarin d/t interaction)       Anticoagulation Care Providers     Provider Role Specialty Phone number    Jodi Flower Mai, MD [facility] Internal Medicine [phone]

## 2020-06-02 ENCOUNTER — HOSPITAL ENCOUNTER (OUTPATIENT)
Dept: CARDIOLOGY | Facility: CLINIC | Age: 80
Discharge: HOME | End: 2020-06-02
Attending: NURSE PRACTITIONER | Admitting: NURSE PRACTITIONER
Payer: MEDICARE

## 2020-06-02 DIAGNOSIS — Z79.01 LONG TERM CURRENT USE OF ANTICOAGULANT THERAPY: ICD-10-CM

## 2020-06-02 DIAGNOSIS — Z95.2 S/P MITRAL VALVE REPLACEMENT: ICD-10-CM

## 2020-06-02 DIAGNOSIS — I48.19 PERSISTENT ATRIAL FIBRILLATION (H): ICD-10-CM

## 2020-06-02 DIAGNOSIS — I48.91 AF (ATRIAL FIBRILLATION) (H): ICD-10-CM

## 2020-06-02 PROCEDURE — 0296T ZIO PATCH HOLTER ADULT PEDIATRIC GREATER THAN 48 HRS: CPT

## 2020-06-02 PROCEDURE — 0298T ZIO PATCH HOLTER ADULT PEDIATRIC GREATER THAN 48 HRS: CPT | Performed by: INTERNAL MEDICINE

## 2020-06-02 RX ORDER — WARFARIN SODIUM 5 MG/1
15 TABLET ORAL DAILY
Qty: 280 TABLET | Refills: 0 | OUTPATIENT
Start: 2020-06-02

## 2020-06-02 NOTE — TELEPHONE ENCOUNTER
Anticoagulation Monitoring Instructions   Latest Ref Rng & Units    6/1/2020 6/1: 15 mg; Otherwise 20 mg every Mon, Wed, Fri; 25 mg all other days

## 2020-06-02 NOTE — TELEPHONE ENCOUNTER
Spoke to patient by phone.  He has switched to the 10 mg tablets of warfarin and doesn't want 5 mg tablets.  Order for 5 mg tablets was refused.    Caryn Campos RN

## 2020-06-02 NOTE — TELEPHONE ENCOUNTER
Medication requested:  Warfarin 5 mg tablets    Last Written Prescription Date: 3/25/2020  Last Fill Qty: 280, # refills: 0  Last Office Visit with G, P or [facility] prescribing provider: 4/7/2020     Lab Results   Component Value Date    INR 4.40 06/01/2020    INR 2.60 05/20/2020       It appears that he changed to 10 mg tablets of warfarin on 5/14/2020.  Called patient and left a message for him to clarify if he is still using 5 mg tablets along with the 10 mg ones.    Caryn Campos RN

## 2020-06-09 DIAGNOSIS — E78.2 MIXED HYPERLIPIDEMIA: ICD-10-CM

## 2020-06-09 DIAGNOSIS — E78.5 HYPERLIPIDEMIA, UNSPECIFIED HYPERLIPIDEMIA TYPE: ICD-10-CM

## 2020-06-09 DIAGNOSIS — K51.20 ULCERATIVE PROCTITIS WITHOUT COMPLICATION (H): ICD-10-CM

## 2020-06-10 NOTE — TELEPHONE ENCOUNTER
Routing refill request to provider for review/approval because:  Not prescribed by new PCP  Kimberlee Prescott RN, BSN

## 2020-06-12 ENCOUNTER — ANTICOAGULATION THERAPY VISIT (OUTPATIENT)
Dept: ANTICOAGULATION | Facility: CLINIC | Age: 80
End: 2020-06-12
Payer: MEDICARE

## 2020-06-12 ENCOUNTER — TELEPHONE (OUTPATIENT)
Dept: PEDIATRICS | Facility: CLINIC | Age: 80
End: 2020-06-12

## 2020-06-12 ENCOUNTER — TRANSFERRED RECORDS (OUTPATIENT)
Dept: HEALTH INFORMATION MANAGEMENT | Facility: CLINIC | Age: 80
End: 2020-06-12

## 2020-06-12 DIAGNOSIS — Z95.2 S/P MITRAL VALVE REPLACEMENT: ICD-10-CM

## 2020-06-12 DIAGNOSIS — Z95.2 S/P AORTIC VALVE REPLACEMENT: ICD-10-CM

## 2020-06-12 DIAGNOSIS — T84.50XA INFECTION AND INFLAMMATORY REACTION DUE TO INTERNAL JOINT PROSTHESIS (H): Primary | ICD-10-CM

## 2020-06-12 DIAGNOSIS — I48.19 PERSISTENT ATRIAL FIBRILLATION (H): Primary | ICD-10-CM

## 2020-06-12 DIAGNOSIS — I48.0 PAROXYSMAL ATRIAL FIBRILLATION (H): ICD-10-CM

## 2020-06-12 DIAGNOSIS — Z79.01 LONG TERM CURRENT USE OF ANTICOAGULANT THERAPY: ICD-10-CM

## 2020-06-12 LAB
CAPILLARY BLOOD COLLECTION: NORMAL
INR PPP: 1.8 (ref 0.86–1.14)

## 2020-06-12 PROCEDURE — 85610 PROTHROMBIN TIME: CPT | Performed by: INTERNAL MEDICINE

## 2020-06-12 PROCEDURE — 36416 COLLJ CAPILLARY BLOOD SPEC: CPT | Performed by: INTERNAL MEDICINE

## 2020-06-12 PROCEDURE — 99207 ZZC NO CHARGE NURSE ONLY: CPT | Performed by: INTERNAL MEDICINE

## 2020-06-12 NOTE — TELEPHONE ENCOUNTER
Left message for patient to call back.  Where would Patient like his refill sent?      Genet Del Toro, CMA

## 2020-06-12 NOTE — TELEPHONE ENCOUNTER
For insurance purposes, an Anticoagulation Clinic referral is needed.  Please sign order and route message back to JACQUELINE Whitlock.  Ruthann Sanders RN

## 2020-06-12 NOTE — TELEPHONE ENCOUNTER
Please send to [facility] in [place].     Johanny Najera on 6/12/2020 at 12:20 PM

## 2020-06-12 NOTE — PROGRESS NOTES
Patient reports that he has warfarin 10 mg tablets.  Updated calendar to reflect this.  Ruthann Sanders RN

## 2020-06-12 NOTE — TELEPHONE ENCOUNTER
Pt called back; pt asking What refills?  Please call pt again at [phone].  Thank you.  [email or web address]

## 2020-06-12 NOTE — PROGRESS NOTES
ANTICOAGULATION FOLLOW-UP CLINIC VISIT    Patient Name:  Fausto Farr  Date:  6/12/2020  Contact Type:  Telephone/ discussed with patient    SUBJECTIVE:  Patient Findings     Positives:   Change in health (has had diarrhea.  This is improving since stopping the riframpin), Change in medications (rifampin stopped on 6/9.  Continues on Augmentin.  Will need to watch INR very closly over the next 3-4 weeks since it will increase now that he is off the riframpin.  Notified patient that he will need to have more frequent INR checks during this time.)    Comments:   Maintenance dose prior to starting the rifampin was 5 mg Mon, Wed, Fri and 7.5 mg Sun, Tu, Th, Sat.  Ultimately, patient may need to be on a lower dose than this since he had an infection while he was on this dose and hopefully the infection has cleared now.  Patient has lab appointment on Monday 6/15 per Dr. Leija to check for infection.  The patient was assessed for diet, and activity level changes, missed or extra doses, bruising or bleeding, with no problem findings.          Clinical Outcomes     Negatives:   Major bleeding event, Thromboembolic event, Anticoagulation-related hospital admission, Anticoagulation-related ED visit, Anticoagulation-related fatality    Comments:   Maintenance dose prior to starting the rifampin was 5 mg Mon, Wed, Fri and 7.5 mg Sun, Tu, Th, Sat.  Ultimately, patient may need to be on a lower dose than this since he had an infection while he was on this dose and hopefully the infection has cleared now.  Patient has lab appointment on Monday 6/15 per Dr. Leija to check for infection.  The patient was assessed for diet, and activity level changes, missed or extra doses, bruising or bleeding, with no problem findings.             OBJECTIVE    Recent labs: (last 7 days)     06/12/20  1104   INR 1.80*       ASSESSMENT / PLAN  INR assessment SUB    Recheck INR In: 3 DAYS    INR Location Clinic      Anticoagulation Summary  As  of 2020    INR goal:   2.5-3.5   TTR:   65.3 % (10.9 mo)   INR used for dosin.80! (2020)   Warfarin maintenance plan:   20 mg (5 mg x 4) every Mon, Wed, Fri; 25 mg (5 mg x 5) all other days   Full warfarin instructions:   : 30 mg; Otherwise 20 mg every Mon, Wed, Fri; 25 mg all other days   Weekly warfarin total:   160 mg   Plan last modified:   Gina Villagomez RN (2020)   Next INR check:   6/15/2020   Priority:   High   Target end date:   Indefinite    Indications    AF (atrial fibrillation) (H) [I48.91]  S/P mitral valve replacement [Z95.2]  Long term current use of anticoagulant therapy [Z79.01]             Anticoagulation Episode Summary     INR check location:       Preferred lab:   EXTERNAL LAB    Send INR reminders to:   SHANNA DOWELL    Comments:   3/16 Started rifampin x 3 months (requires significantly higher dose of warfarin d/t interaction)       Anticoagulation Care Providers     Provider Role Specialty Phone number    Jodi Flower Mai, MD [facility] Internal Medicine [phone]            See the Encounter Report to view Anticoagulation Flowsheet and Dosing Calendar (Go to Encounters tab in chart review, and find the Anticoagulation Therapy Visit)    Dosage adjustment made based on physician directed care plan.    Ruthann Sanders RN

## 2020-06-15 ENCOUNTER — ANTICOAGULATION THERAPY VISIT (OUTPATIENT)
Dept: NURSING | Facility: CLINIC | Age: 80
End: 2020-06-15
Payer: MEDICARE

## 2020-06-15 DIAGNOSIS — Z95.2 S/P MITRAL VALVE REPLACEMENT: ICD-10-CM

## 2020-06-15 DIAGNOSIS — T84.50XA INFECTION AND INFLAMMATORY REACTION DUE TO INTERNAL JOINT PROSTHESIS (H): ICD-10-CM

## 2020-06-15 DIAGNOSIS — I48.19 PERSISTENT ATRIAL FIBRILLATION (H): ICD-10-CM

## 2020-06-15 DIAGNOSIS — I48.91 AF (ATRIAL FIBRILLATION) (H): ICD-10-CM

## 2020-06-15 DIAGNOSIS — Z79.01 LONG TERM CURRENT USE OF ANTICOAGULANT THERAPY: ICD-10-CM

## 2020-06-15 DIAGNOSIS — I48.0 PAROXYSMAL ATRIAL FIBRILLATION (H): ICD-10-CM

## 2020-06-15 DIAGNOSIS — Z95.2 S/P AORTIC VALVE REPLACEMENT: ICD-10-CM

## 2020-06-15 LAB
BASOPHILS # BLD AUTO: 0 10E9/L (ref 0–0.2)
BASOPHILS NFR BLD AUTO: 0.5 %
CRP SERPL-MCNC: 5.5 MG/L (ref 0–8)
DIFFERENTIAL METHOD BLD: ABNORMAL
EOSINOPHIL # BLD AUTO: 0.2 10E9/L (ref 0–0.7)
EOSINOPHIL NFR BLD AUTO: 2 %
ERYTHROCYTE [DISTWIDTH] IN BLOOD BY AUTOMATED COUNT: 13.3 % (ref 10–15)
ERYTHROCYTE [SEDIMENTATION RATE] IN BLOOD BY WESTERGREN METHOD: 29 MM/H (ref 0–20)
HCT VFR BLD AUTO: 40 % (ref 40–53)
HGB BLD-MCNC: 13 G/DL (ref 13.3–17.7)
INR PPP: 2.3 (ref 0.86–1.14)
LYMPHOCYTES # BLD AUTO: 1.5 10E9/L (ref 0.8–5.3)
LYMPHOCYTES NFR BLD AUTO: 19.8 %
MCH RBC QN AUTO: 29.3 PG (ref 26.5–33)
MCHC RBC AUTO-ENTMCNC: 32.5 G/DL (ref 31.5–36.5)
MCV RBC AUTO: 90 FL (ref 78–100)
MONOCYTES # BLD AUTO: 1 10E9/L (ref 0–1.3)
MONOCYTES NFR BLD AUTO: 13.4 %
NEUTROPHILS # BLD AUTO: 4.9 10E9/L (ref 1.6–8.3)
NEUTROPHILS NFR BLD AUTO: 64.3 %
PLATELET # BLD AUTO: 316 10E9/L (ref 150–450)
RBC # BLD AUTO: 4.44 10E12/L (ref 4.4–5.9)
WBC # BLD AUTO: 7.6 10E9/L (ref 4–11)

## 2020-06-15 PROCEDURE — 85652 RBC SED RATE AUTOMATED: CPT | Performed by: INTERNAL MEDICINE

## 2020-06-15 PROCEDURE — 85610 PROTHROMBIN TIME: CPT | Performed by: INTERNAL MEDICINE

## 2020-06-15 PROCEDURE — 99207 ZZC NO CHARGE NURSE ONLY: CPT

## 2020-06-15 PROCEDURE — 85025 COMPLETE CBC W/AUTO DIFF WBC: CPT | Performed by: INTERNAL MEDICINE

## 2020-06-15 PROCEDURE — 86140 C-REACTIVE PROTEIN: CPT | Performed by: INTERNAL MEDICINE

## 2020-06-15 PROCEDURE — 36416 COLLJ CAPILLARY BLOOD SPEC: CPT | Performed by: INTERNAL MEDICINE

## 2020-06-15 NOTE — PROGRESS NOTES
ANTICOAGULATION FOLLOW-UP     Patient Name:  Fausto Farr  Date:  6/15/2020  Contact Type:  Telephone    SUBJECTIVE:  Patient Findings     Positives:   Change in medications    Comments:   Stopped taking rifampin on 2020.  Per micromedex: Severity: Moderate.  Concurrent use of RIFAMPIN and WARFARIN may result in decreased anticoagulant effectiveness of warfarin.   He has needed a 120% increase in warfarin dose since he started taking rifampin in 2020.  Anticipate a rise in INR now that he has discontinued rifampin.    The patient was assessed for   diet,   missed or extra doses,   bruising or bleeding,   with no problem findings.  No questions or concerns.  Reviewed previous warfarin dosing with patient.    INR is therapeutic today.   Patient will take 25 mg Mon, Tu, 20 mg Wed, Th.  Follow up in 4 days.            Clinical Outcomes     Comments:   Stopped taking rifampin on 2020.  Per micromedex: Severity: Moderate.  Concurrent use of RIFAMPIN and WARFARIN may result in decreased anticoagulant effectiveness of warfarin.   He has needed a 120% increase in warfarin dose since he started taking rifampin in 2020.  Anticipate a rise in INR now that he has discontinued rifampin.    The patient was assessed for   diet,   missed or extra doses,   bruising or bleeding,   with no problem findings.  No questions or concerns.  Reviewed previous warfarin dosing with patient.    INR is therapeutic today.   Patient will take 25 mg Mon, Tu, 20 mg Wed, Th.  Follow up in 4 days.               OBJECTIVE    Recent labs: (last 7 days)     06/15/20  1020   INR 2.30*       ASSESSMENT / PLAN  INR assessment THER    Recheck INR In: 4 DAYS    INR Location Clinic lab     Anticoagulation Summary  As of 6/15/2020    INR goal:   2.5-3.5   TTR:   64.4 % (10.9 mo)   Prior goal:   2.5-3.5   INR used for dosin.30! (6/15/2020)   Warfarin maintenance plan:   20 mg (10 mg x 2) every Mon, Wed, Fri; 25 mg (10 mg x  2.5) all other days   Full warfarin instructions:   6/15: 25 mg; 6/18: 20 mg; Otherwise 20 mg every Mon, Wed, Fri; 25 mg all other days   Weekly warfarin total:   160 mg   Plan last modified:   Ruthann Sanders RN (6/12/2020)   Next INR check:   6/19/2020   Priority:   High   Target end date:   Indefinite    Indications    AF (atrial fibrillation) (H) [I48.91]  S/P mitral valve replacement [Z95.2]  Long term current use of anticoagulant therapy [Z79.01]  Persistent atrial fibrillation (H) [I48.19]  S/P aortic valve replacement [Z95.2]             Anticoagulation Episode Summary     INR check location:       Preferred lab:   EXTERNAL LAB    Send INR reminders to:   SHANNA DOWELL    Comments:   3/16 Started rifampin x 3 months (requires significantly higher dose of warfarin d/t interaction)       Anticoagulation Care Providers     Provider Role Specialty Phone number    Jodi Flower Mai, MD Referring Internal Medicine [phone]            See the Encounter Report to view Anticoagulation Flowsheet and Dosing Calendar (Go to Encounters tab in chart review, and find the Anticoagulation Therapy Visit)    INR is therapeutic today.   Patient will take 25 mg Mon, Tues, 20 mg Wed, Thurs.  Follow up in 4 days.        Caryn Campos RN

## 2020-06-16 ENCOUNTER — TELEPHONE (OUTPATIENT)
Dept: PEDIATRICS | Facility: CLINIC | Age: 80
End: 2020-06-16

## 2020-06-16 RX ORDER — ROSUVASTATIN CALCIUM 40 MG/1
40 TABLET, COATED ORAL DAILY
Qty: 90 TABLET | Refills: 0 | Status: SHIPPED | OUTPATIENT
Start: 2020-06-16 | End: 2020-07-24

## 2020-06-16 RX ORDER — MESALAMINE 800 MG/1
1 TABLET, DELAYED RELEASE ORAL 2 TIMES DAILY
Qty: 180 TABLET | Refills: 0 | Status: SHIPPED | OUTPATIENT
Start: 2020-06-16 | End: 2020-07-24

## 2020-06-16 RX ORDER — EZETIMIBE 10 MG/1
10 TABLET ORAL DAILY
Qty: 90 TABLET | Refills: 0 | Status: SHIPPED | OUTPATIENT
Start: 2020-06-16 | End: 2020-07-24

## 2020-06-19 ENCOUNTER — ANTICOAGULATION THERAPY VISIT (OUTPATIENT)
Dept: PEDIATRICS | Facility: CLINIC | Age: 80
End: 2020-06-19

## 2020-06-19 DIAGNOSIS — I48.91 AF (ATRIAL FIBRILLATION) (H): ICD-10-CM

## 2020-06-19 DIAGNOSIS — Z79.01 LONG TERM CURRENT USE OF ANTICOAGULANT THERAPY: ICD-10-CM

## 2020-06-19 DIAGNOSIS — Z95.2 S/P MITRAL VALVE REPLACEMENT: ICD-10-CM

## 2020-06-19 DIAGNOSIS — Z95.2 S/P AORTIC VALVE REPLACEMENT: ICD-10-CM

## 2020-06-19 DIAGNOSIS — I48.19 PERSISTENT ATRIAL FIBRILLATION (H): ICD-10-CM

## 2020-06-19 DIAGNOSIS — I48.0 PAROXYSMAL ATRIAL FIBRILLATION (H): ICD-10-CM

## 2020-06-19 LAB
CAPILLARY BLOOD COLLECTION: NORMAL
INR PPP: 5.2 (ref 0.86–1.14)

## 2020-06-19 PROCEDURE — 99207 ZZC NO CHARGE NURSE ONLY: CPT | Performed by: INTERNAL MEDICINE

## 2020-06-19 PROCEDURE — 36416 COLLJ CAPILLARY BLOOD SPEC: CPT | Performed by: INTERNAL MEDICINE

## 2020-06-19 PROCEDURE — 85610 PROTHROMBIN TIME: CPT | Performed by: INTERNAL MEDICINE

## 2020-06-19 NOTE — PROGRESS NOTES
ANTICOAGULATION FOLLOW-UP CLINIC VISIT    Patient Name:  Fausto Farr  Date:  2020  Contact Type:  Telephone    SUBJECTIVE:  Patient Findings     Positives:   Change in health (Diarrhea improving but still having daily.  Per  notes from Dr. Leija--pt may need to be tested for C.Difficile if diarrhea continues.), Change in medications (Rifampin discontinued on 20.  Augmentin dose decreased to 875mg daily for long-term use. Pt taking OTC Immodium and Flagyl was d/c'd on 20.)    Comments:   Warfarin dosing reviewed by KAYKAY Sams PharmD.  We will continue checking INR 2 times per week until INR stabilizes.        Clinical Outcomes     Negatives:   Major bleeding event, Thromboembolic event, Anticoagulation-related hospital admission, Anticoagulation-related ED visit, Anticoagulation-related fatality    Comments:   Warfarin dosing reviewed by KAYKAY SamsD.  We will continue checking INR 2 times per week until INR stabilizes.           OBJECTIVE    Recent labs: (last 7 days)     20  0932   INR 5.20*       ASSESSMENT / PLAN  INR assessment SUPRA    Recheck INR In: 3 DAYS    INR Location Clinic      Anticoagulation Summary  As of 2020    INR goal:   2.5-3.5   TTR:   63.6 % (10.9 mo)   INR used for dosin.20! (2020)   Warfarin maintenance plan:   20 mg (10 mg x 2) every day   Full warfarin instructions:   : Hold; Otherwise 20 mg every day   Weekly warfarin total:   140 mg   Plan last modified:   Aure Sampson RN (2020)   Next INR check:   2020   Priority:   High   Target end date:   Indefinite    Indications    AF (atrial fibrillation) (H) [I48.91]  S/P mitral valve replacement [Z95.2]  Long term current use of anticoagulant therapy [Z79.01]  Persistent atrial fibrillation (H) [I48.19]  S/P aortic valve replacement [Z95.2]             Anticoagulation Episode Summary     INR check location:       Preferred lab:   EXTERNAL LAB    Send INR reminders to:    SHANNA DOWELL    Comments:   3/16 Started rifampin x 3 months (requires significantly higher dose of warfarin d/t interaction)       Anticoagulation Care Providers     Provider Role Specialty Phone number    Jodi Flower Mai, MD Referring Internal Medicine [phone]            See the Encounter Report to view Anticoagulation Flowsheet and Dosing Calendar (Go to Encounters tab in chart review, and find the Anticoagulation Therapy Visit)        Aure Sampson RN

## 2020-06-19 NOTE — PROGRESS NOTES
[facility] calling to report critical INR of 5.2 for today. Routing to [facility]

## 2020-06-22 ENCOUNTER — ANTICOAGULATION THERAPY VISIT (OUTPATIENT)
Dept: NURSING | Facility: CLINIC | Age: 80
End: 2020-06-22

## 2020-06-22 DIAGNOSIS — Z95.2 S/P MITRAL VALVE REPLACEMENT: ICD-10-CM

## 2020-06-22 DIAGNOSIS — Z79.01 LONG TERM CURRENT USE OF ANTICOAGULANT THERAPY: ICD-10-CM

## 2020-06-22 DIAGNOSIS — Z95.2 S/P AORTIC VALVE REPLACEMENT: ICD-10-CM

## 2020-06-22 DIAGNOSIS — I48.0 PAROXYSMAL ATRIAL FIBRILLATION (H): ICD-10-CM

## 2020-06-22 DIAGNOSIS — I48.19 PERSISTENT ATRIAL FIBRILLATION (H): ICD-10-CM

## 2020-06-22 DIAGNOSIS — Z79.01 LONG TERM CURRENT USE OF ANTICOAGULANT THERAPY: Primary | ICD-10-CM

## 2020-06-22 DIAGNOSIS — I48.91 AF (ATRIAL FIBRILLATION) (H): ICD-10-CM

## 2020-06-22 LAB
CAPILLARY BLOOD COLLECTION: NORMAL
INR PPP: 3.5 (ref 0.86–1.14)

## 2020-06-22 PROCEDURE — 85610 PROTHROMBIN TIME: CPT | Performed by: INTERNAL MEDICINE

## 2020-06-22 PROCEDURE — 36416 COLLJ CAPILLARY BLOOD SPEC: CPT | Performed by: INTERNAL MEDICINE

## 2020-06-22 PROCEDURE — 99207 ZZC NO CHARGE NURSE ONLY: CPT

## 2020-06-22 NOTE — PROGRESS NOTES
ANTICOAGULATION FOLLOW-UP     Patient Name:  Fausto Farr  Date:  6/22/2020  Contact Type:  Telephone    SUBJECTIVE:  Patient Findings     Comments:   Stopped taking rifampin on 6/9/2020.  Per micromedex: Severity: Moderate.  Concurrent use of RIFAMPIN and WARFARIN may result in decreased anticoagulant effectiveness of warfarin.   He has needed a 120% increase in warfarin dose since he started taking rifampin in March 2020.  Anticipate a rise in INR now that he has discontinued rifampin.     The patient was assessed for   diet, medication,  missed or extra doses,   bruising or bleeding,   with no problem findings.  No questions or concerns.  Reviewed previous warfarin dosing with patient.     INR is therapeutic today.   Patient will take 15 mg Mon, Tues, Wed, Thurs.  Follow up in 4 days.        Clinical Outcomes     Comments:   Stopped taking rifampin on 6/9/2020.  Per micromedex: Severity: Moderate.  Concurrent use of RIFAMPIN and WARFARIN may result in decreased anticoagulant effectiveness of warfarin.   He has needed a 120% increase in warfarin dose since he started taking rifampin in March 2020.  Anticipate a rise in INR now that he has discontinued rifampin.     The patient was assessed for   diet, medication,  missed or extra doses,   bruising or bleeding,   with no problem findings.  No questions or concerns.  Reviewed previous warfarin dosing with patient.     INR is therapeutic today.   Patient will take 15 mg Mon, Tues, Wed, Thurs.  Follow up in 4 days.           OBJECTIVE    Recent labs: (last 7 days)     06/22/20  0834   INR 3.50*       ASSESSMENT / PLAN  INR assessment THER    Recheck INR In: 3 DAYS    INR Location Clinic lab     Anticoagulation Summary  As of 6/22/2020    INR goal:   2.5-3.5   TTR:   62.7 % (10.9 mo)   INR used for dosing:   3.50 (6/22/2020)   Warfarin maintenance plan:   20 mg (10 mg x 2) every Sun, Fri, Sat; 15 mg (10 mg x 1.5) all other days   Full warfarin instructions:   20  mg every Sun, Fri, Sat; 15 mg all other days   Weekly warfarin total:   120 mg   Plan last modified:   Caryn Campos RN (6/22/2020)   Next INR check:   6/26/2020   Priority:   High   Target end date:   Indefinite    Indications    AF (atrial fibrillation) (H) [I48.91]  S/P mitral valve replacement [Z95.2]  Long term current use of anticoagulant therapy [Z79.01]  Persistent atrial fibrillation (H) [I48.19]  S/P aortic valve replacement [Z95.2]             Anticoagulation Episode Summary     INR check location:       Preferred lab:   EXTERNAL LAB    Send INR reminders to:   SHANNA DOWELL    Comments:   3/16 Started rifampin x 3 months (requires significantly higher dose of warfarin d/t interaction)       Anticoagulation Care Providers     Provider Role Specialty Phone number    Jodi Flower Mai, MD Referring Internal Medicine [phone]            See the Encounter Report to view Anticoagulation Flowsheet and Dosing Calendar (Go to Encounters tab in chart review, and find the Anticoagulation Therapy Visit)    INR is therapeutic today.   Patient will take 15 mg Mon, Tues, Wed, Thurs.  Follow up in 4 days.      Caryn Campos, RN

## 2020-06-26 ENCOUNTER — ANTICOAGULATION THERAPY VISIT (OUTPATIENT)
Dept: PEDIATRICS | Facility: CLINIC | Age: 80
End: 2020-06-26

## 2020-06-26 ENCOUNTER — VIRTUAL VISIT (OUTPATIENT)
Dept: CARDIOLOGY | Facility: CLINIC | Age: 80
End: 2020-06-26
Attending: NURSE PRACTITIONER
Payer: MEDICARE

## 2020-06-26 VITALS — WEIGHT: 202 LBS | BODY MASS INDEX: 33.1 KG/M2

## 2020-06-26 DIAGNOSIS — Z95.2 S/P MITRAL VALVE REPLACEMENT: ICD-10-CM

## 2020-06-26 DIAGNOSIS — I48.19 PERSISTENT ATRIAL FIBRILLATION (H): ICD-10-CM

## 2020-06-26 DIAGNOSIS — Z95.2 S/P AORTIC VALVE REPLACEMENT: ICD-10-CM

## 2020-06-26 DIAGNOSIS — Z79.01 LONG TERM CURRENT USE OF ANTICOAGULANT THERAPY: ICD-10-CM

## 2020-06-26 DIAGNOSIS — I48.0 PAROXYSMAL ATRIAL FIBRILLATION (H): ICD-10-CM

## 2020-06-26 DIAGNOSIS — I47.29 NSVT (NONSUSTAINED VENTRICULAR TACHYCARDIA) (H): Primary | ICD-10-CM

## 2020-06-26 DIAGNOSIS — I48.91 AF (ATRIAL FIBRILLATION) (H): ICD-10-CM

## 2020-06-26 LAB
CAPILLARY BLOOD COLLECTION: NORMAL
INR PPP: 6.4 (ref 0.86–1.14)

## 2020-06-26 PROCEDURE — 99207 ZZC NO CHARGE NURSE ONLY: CPT | Performed by: INTERNAL MEDICINE

## 2020-06-26 PROCEDURE — 85610 PROTHROMBIN TIME: CPT | Performed by: INTERNAL MEDICINE

## 2020-06-26 PROCEDURE — 99443 ZZC PHYSICIAN TELEPHONE EVALUATION 21-30 MIN: CPT | Performed by: INTERNAL MEDICINE

## 2020-06-26 PROCEDURE — 36416 COLLJ CAPILLARY BLOOD SPEC: CPT | Performed by: INTERNAL MEDICINE

## 2020-06-26 NOTE — PROGRESS NOTES
ANTICOAGULATION FOLLOW-UP CLINIC VISIT    Patient Name:  Fausto Farr  Date:  6/26/2020  Contact Type:  Telephone    SUBJECTIVE:  Patient Findings     Comments:   Stopped taking rifampin on 6/9/2020.  Per micromedex: Severity: Moderate.  Concurrent use of RIFAMPIN and WARFARIN may result in decreased anticoagulant effectiveness of warfarin.   He has needed a 120% increase in warfarin dose since he started taking rifampin in March 2020. Maintenance dose prior to rifampin initiation was 5 mg (5 mg x 1) every Mon, Fri; 7.5 mg (5 mg x 1.5) all other days (47.5 mg/weekly)  Anticipate a rise in INR now that he has discontinued rifampin.  Per protocol will hold 2 days and reduce maintenance by 20.8%.  Denies any s/s of bleeding. Will call 911 or be taken to the ED if he develops symtopms.  Encouraged to eat extra vitamin k today.        Clinical Outcomes     Negatives:   Major bleeding event, Thromboembolic event, Anticoagulation-related hospital admission, Anticoagulation-related ED visit, Anticoagulation-related fatality    Comments:   Stopped taking rifampin on 6/9/2020.  Per micromedex: Severity: Moderate.  Concurrent use of RIFAMPIN and WARFARIN may result in decreased anticoagulant effectiveness of warfarin.   He has needed a 120% increase in warfarin dose since he started taking rifampin in March 2020. Maintenance dose prior to rifampin initiation was 5 mg (5 mg x 1) every Mon, Fri; 7.5 mg (5 mg x 1.5) all other days (47.5 mg/weekly)  Anticipate a rise in INR now that he has discontinued rifampin.  Per protocol will hold 2 days and reduce maintenance by 20.8%.  Denies any s/s of bleeding. Will call 911 or be taken to the ED if he develops symtopms.  Encouraged to eat extra vitamin k today.           OBJECTIVE    Recent labs: (last 7 days)     06/26/20  0931   INR 6.40*       ASSESSMENT / PLAN  No question data found.  Anticoagulation Summary  As of 6/26/2020    INR goal:   2.5-3.5   TTR:   61.4 % (10.9 mo)    INR used for dosin.40! (2020)   Warfarin maintenance plan:   10 mg (10 mg x 1) every Sun, Thu; 15 mg (10 mg x 1.5) all other days   Full warfarin instructions:   : Hold; : Hold; Otherwise 10 mg every Sun, Thu; 15 mg all other days   Weekly warfarin total:   95 mg   Plan last modified:   Yolis Domínguez RN (2020)   Next INR check:   2020   Priority:   Critical   Target end date:   Indefinite    Indications    AF (atrial fibrillation) (H) [I48.91]  S/P mitral valve replacement [Z95.2]  Long term current use of anticoagulant therapy [Z79.01]  Persistent atrial fibrillation (H) [I48.19]  S/P aortic valve replacement [Z95.2]             Anticoagulation Episode Summary     INR check location:       Preferred lab:   EXTERNAL LAB    Send INR reminders to:   SHANNA DOWELL    Comments:   3/16 Started rifampin x 3 months (requires significantly higher dose of warfarin d/t interaction)       Anticoagulation Care Providers     Provider Role Specialty Phone number    Jodi Flower Mai, MD Referring Internal Medicine [phone]            See the Encounter Report to view Anticoagulation Flowsheet and Dosing Calendar (Go to Encounters tab in chart review, and find the Anticoagulation Therapy Visit)        Yolis Domínguez RN

## 2020-06-26 NOTE — PROGRESS NOTES
"Fausto Farr is a 80 year old male who is being evaluated via a billable telephone visit.      The patient has been notified of following:     \"This telephone visit will be conducted via a call between you and your physician/provider. We have found that certain health care needs can be provided without the need for a physical exam.  This service lets us provide the care you need with a short phone conversation.  If a prescription is necessary we can send it directly to your pharmacy.  If lab work is needed we can place an order for that and you can then stop by our lab to have the test done at a later time.    Telephone visits are billed at different rates depending on your insurance coverage. During this emergency period, for some insurers they may be billed the same as an in-person visit.  Please reach out to your insurance provider with any questions.    If during the course of the call the physician/provider feels a telephone visit is not appropriate, you will not be charged for this service.\"    Patient has given verbal consent for Telephone visit?  Yes    What phone number would you like to be contacted at? [phone]  Pt reported wt today 202#  ZACK Blount    How would you like to obtain your AVS? Mail a copy         PHYSICIAN NOTE:  This visit was completed via telephone due to COVID-19 precautions.  The patient provided consent for a telephone visit.  I had the pleasure speaking to Mr. Ralph Farr as part of a telephone visit today.    The patient is a delightful 80-year-old male with the following chronic medical issues:  1.  Valvular heart disease.  AVR in 2006 with subsequent perivalvular leak and redo mechanical AVR and concomitant mechanical MVR in 2013.   2.  Coronary artery disease with CABG (LIMA to LAD and radial graft to RCA) in 2006.   3.  Chronic diastolic heart failure.  LVEF is 55%-60%.   4.  Previous endovascular AAA repair.   5.  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    6.  Obstructive sleep apnea with use of CPAP.   7.  Hypertension.   8.  Dyslipidemia.   9.  Chronic back pain.   10.  Mild internal carotid artery disease.  Severe stenosis of left external carotid artery.   11.   History of typical atrial flutter, successful catheter ablation in 04/2019.   12.  Varicose veins with venous insufficiency.  Treatment with VenaSeal closure, sclerotherapy and phlebectomy by Dr. Whitman in 01/2019.  13.  Asymptomatic NSVT.    I was asked to reevaluate Ralph because of arrhythmias on his recent leadless cardiac monitor.  He has underlying AV conduction disease with bifascicular block and prolonged NM.  He used to be on metoprolol tartrate 25 mg twice daily which was discontinued because of chronotropic incompetence and occasional second-degree AV block.    More recently he was having issues with a nonhealing leg wound which has finally closed.  He is now participating in rehab.  At one point we were considering a permanent pacemaker for Mobitz 2 AV block, but did not proceed with this because of his open wound.    The patient recently completed a 14-day cardiac monitor.  He is not on a beta-blocker at the moment.  He had frequent PVCs, burden 11%, with over 500 runs of nonsustained VT.  He also had frequent PACs (5-6%) with 3,000 runs of SVT.  No periods of AV block.    Today Boris tells me he feels absolutely fine.  He has had no palpitation, syncope or near syncope.  Most significant issue at the moment is INR fluctuation with a recent measurement of over 6.      IMPRESSION:  1. Frequent asymptomatic atrial and ventricular arrhythmias, including PVCs, PACs, NSVT and nonsustained atrial tachycardia.  There is a high burden of this arrhythmias but, surprisingly, he is completely asymptomatic.  2. History of AV conduction disease with bifascicular block, first-degree AV block and intermittent second-degree AV block Mobitz 2.  Of note, we did not see  significant periods of bradycardia or AV block on his recent cardiac monitor.  3. Mechanical AVR and MVR.  His INR has been all over the place recently.  He is being closely monitored in the anticoagulation clinic.  4. Previously normal LVEF (echo- 2018).  No significant ischemia on a relatively recent nuclear stress test that was ordered because of NSVT (04/2019).    RECOMMENDATIONS:  1. Repeat echocardiogram.  2. There is no evidence-based reason to treat his asymptomatic arrhythmias, but their high burden is admittedly concerning.  3. Restart metoprolol tartrate but at a low dose of 12.5 mg twice daily.  Of note, in the past the patient had periods of second-degree AV block while on metoprolol tartrate 25 mg twice daily.  4. We will consider a repeat cardiac monitor in a few weeks.  5. Follow-up will be arranged after I review she results of his echocardiogram.    Complex patient, 40 minutes total time spent in this visit.    Jessy Vasquez MD, [facility]      Telephone visit documentation  Start: 12:35 PM  End: 1 PM  Time: 25 minutes

## 2020-06-26 NOTE — LETTER
6/26/2020    Chris Flower MD  [address] [place] [address]    RE: Fausto Farr       Dear Colleague,    I had the pleasure of seeing Fausto Farr in the [facility].    Fausto Farr is a 80 year old male who is being evaluated via a billable telephone visit.        PHYSICIAN NOTE:  This visit was completed via telephone due to COVID-19 precautions.  The patient provided consent for a telephone visit.  I had the pleasure speaking to Mr. Ralph Farr as part of a telephone visit today.    The patient is a delightful 80-year-old male with the following chronic medical issues:  1.  Valvular heart disease.  AVR in 2006 with subsequent perivalvular leak and redo mechanical AVR and concomitant mechanical MVR in 2013.   2.  Coronary artery disease with CABG (LIMA to LAD and radial graft to RCA) in 2006.   3.  Chronic diastolic heart failure.  LVEF is 55%-60%.   4.  Previous endovascular AAA repair.   5.  AV conduction disease with bifascicular block and prolonged FL.    6.  Obstructive sleep apnea with use of CPAP.   7.  Hypertension.   8.  Dyslipidemia.   9.  Chronic back pain.   10.  Mild internal carotid artery disease.  Severe stenosis of left external carotid artery.   11.   History of typical atrial flutter, successful catheter ablation in 04/2019.   12.  Varicose veins with venous insufficiency.  Treatment with VenaSeal closure, sclerotherapy and phlebectomy by Dr. Whitman in 01/2019.  13.  Asymptomatic NSVT.    I was asked to reevaluate Ralph because of arrhythmias on his recent leadless cardiac monitor.  He has underlying AV conduction disease with bifascicular block and prolonged FL.  He used to be on metoprolol tartrate 25 mg twice daily which was discontinued because of chronotropic incompetence and occasional second-degree AV block.    More recently he was having issues with a nonhealing leg wound which has finally closed.  He is now participating in  rehab.  At one point we were considering a permanent pacemaker for Mobitz 2 AV block, but did not proceed with this because of his open wound.    The patient recently completed a 14-day cardiac monitor.  He is not on a beta-blocker at the moment.  He had frequent PVCs, burden 11%, with over 500 runs of nonsustained VT.  He also had frequent PACs (5-6%) with 3,000 runs of SVT.  No periods of AV block.    Today Boris tells me he feels absolutely fine.  He has had no palpitation, syncope or near syncope.  Most significant issue at the moment is INR fluctuation with a recent measurement of over 6.      IMPRESSION:  1. Frequent asymptomatic atrial and ventricular arrhythmias, including PVCs, PACs, NSVT and nonsustained atrial tachycardia.  There is a high burden of this arrhythmias but, surprisingly, he is completely asymptomatic.  2. History of AV conduction disease with bifascicular block, first-degree AV block and intermittent second-degree AV block Mobitz 2.  Of note, we did not see significant periods of bradycardia or AV block on his recent cardiac monitor.  3. Mechanical AVR and MVR.  His INR has been all over the place recently.  He is being closely monitored in the anticoagulation clinic.  4. Previously normal LVEF (2018).  No significant ischemia on a relatively recent nuclear stress test that was completed because of NSVT.    RECOMMENDATIONS:  1. Repeat echocardiogram.  2. There is no evidence-based compelling reason to treat his asymptomatic arrhythmias, but their high burden is admittedly concerning.  3. Restart metoprolol tartrate but at a low dose of 12.5 mg twice daily.  Of note, in the past the patient had periods of second-degree AV block while on metoprolol tartrate 25 mg twice daily.  4. We will consider a repeat cardiac monitor in a few weeks.  5. Follow-up will be arranged after I review she results of his echocardiogram.    Complex patient, 40 minutes total time spent in this visit.      Thank you  for allowing me to participate in the care of your patient.    Sincerely,     Jessy Vasquez MD     [facility]

## 2020-06-29 ENCOUNTER — ANTICOAGULATION THERAPY VISIT (OUTPATIENT)
Dept: NURSING | Facility: CLINIC | Age: 80
End: 2020-06-29

## 2020-06-29 DIAGNOSIS — I48.91 AF (ATRIAL FIBRILLATION) (H): ICD-10-CM

## 2020-06-29 DIAGNOSIS — Z79.01 LONG TERM CURRENT USE OF ANTICOAGULANT THERAPY: ICD-10-CM

## 2020-06-29 DIAGNOSIS — I48.19 PERSISTENT ATRIAL FIBRILLATION (H): ICD-10-CM

## 2020-06-29 DIAGNOSIS — Z79.01 LONG TERM CURRENT USE OF ANTICOAGULANT THERAPY: Primary | ICD-10-CM

## 2020-06-29 DIAGNOSIS — I48.0 PAROXYSMAL ATRIAL FIBRILLATION (H): ICD-10-CM

## 2020-06-29 DIAGNOSIS — Z95.2 S/P AORTIC VALVE REPLACEMENT: ICD-10-CM

## 2020-06-29 DIAGNOSIS — Z95.2 S/P MITRAL VALVE REPLACEMENT: ICD-10-CM

## 2020-06-29 LAB
CAPILLARY BLOOD COLLECTION: NORMAL
INR PPP: 1.7 (ref 0.86–1.14)

## 2020-06-29 PROCEDURE — 99207 ZZC NO CHARGE NURSE ONLY: CPT

## 2020-06-29 PROCEDURE — 36416 COLLJ CAPILLARY BLOOD SPEC: CPT | Performed by: INTERNAL MEDICINE

## 2020-06-29 PROCEDURE — 85610 PROTHROMBIN TIME: CPT | Performed by: INTERNAL MEDICINE

## 2020-06-29 NOTE — PROGRESS NOTES
ANTICOAGULATION FOLLOW-UP     Patient Name:  Fausto Farr  Date:  2020  Contact Type:  Telephone    SUBJECTIVE:  Patient Findings     Positives:   Missed doses, Change in medications    Comments:   Started on metoprolol tartrate 12.5 mg twice daily by cardiology on 2020.  Per Micromedex: No interactions with warfarin found.    Warfarin was held for 2 days due to high INR (6.4) on Friday    Reviewed previous warfarin dosing with patient.  Patient denies:   increased intake of green vegetables,   bleeding/bruising,   or signs/symptoms of a clot.    Consulted with Kristina Issa  Anticoagulation Pharmacy Supervisor  [facility] for warfarin dosing.            Clinical Outcomes     Comments:   Started on metoprolol tartrate 12.5 mg twice daily by cardiology on 2020.  Per Micromedex: No interactions with warfarin found.    Warfarin was held for 2 days due to high INR (6.4) on Friday    Reviewed previous warfarin dosing with patient.  Patient denies:   increased intake of green vegetables,   bleeding/bruising,   or signs/symptoms of a clot.    Consulted with Kristina Issa  Anticoagulation Pharmacy Supervisor  [facility] for warfarin dosing.               OBJECTIVE    Recent labs: (last 7 days)     20  0816   INR 1.70*       ASSESSMENT / PLAN  INR assessment SUB    Recheck INR In: 4 DAYS    INR Location Clinic lab     Anticoagulation Summary  As of 2020    INR goal:   2.5-3.5   TTR:   60.7 % (10.9 mo)   INR used for dosin.70! (2020)   Warfarin maintenance plan:   10 mg (10 mg x 1) every Sun, Thu; 15 mg (10 mg x 1.5) all other days   Full warfarin instructions:   : 20 mg; Otherwise 10 mg every Sun, Thu; 15 mg all other days   Weekly warfarin total:   95 mg   Plan last modified:   Yolis Domínguez RN (2020)   Next INR check:   7/3/2020   Priority:   Critical   Target end date:   Indefinite    Indications    AF (atrial  fibrillation) (H) [I48.91]  S/P mitral valve replacement [Z95.2]  Long term current use of anticoagulant therapy [Z79.01]  Persistent atrial fibrillation (H) [I48.19]  S/P aortic valve replacement [Z95.2]             Anticoagulation Episode Summary     INR check location:       Preferred lab:   EXTERNAL LAB    Send INR reminders to:   SHANNA DOWELL    Comments:   3/16 Started rifampin x 3 months (requires significantly higher dose of warfarin d/t interaction)       Anticoagulation Care Providers     Provider Role Specialty Phone number    oJdi Flower Mai, MD Referring Internal Medicine [phone]            See the Encounter Report to view Anticoagulation Flowsheet and Dosing Calendar (Go to Encounters tab in chart review, and find the Anticoagulation Therapy Visit)    INR is subtherapeutic today.   Patient will take 20 mg today, then resume maintenance dose.   Follow up in 4 days.     Dosage adjustment made based on physician directed care plan.      Caryn Campos RN

## 2020-07-02 ENCOUNTER — HOSPITAL ENCOUNTER (OUTPATIENT)
Dept: CARDIOLOGY | Facility: CLINIC | Age: 80
Discharge: HOME | End: 2020-07-02
Attending: INTERNAL MEDICINE | Admitting: INTERNAL MEDICINE
Payer: MEDICARE

## 2020-07-02 DIAGNOSIS — I47.29 NSVT (NONSUSTAINED VENTRICULAR TACHYCARDIA) (H): ICD-10-CM

## 2020-07-02 PROCEDURE — 25500064 ZZH RX 255 OP 636: Performed by: INTERNAL MEDICINE

## 2020-07-02 PROCEDURE — 93306 TTE W/DOPPLER COMPLETE: CPT | Mod: 26 | Performed by: INTERNAL MEDICINE

## 2020-07-02 PROCEDURE — 93306 TTE W/DOPPLER COMPLETE: CPT

## 2020-07-02 RX ADMIN — HUMAN ALBUMIN MICROSPHERES AND PERFLUTREN 3 ML: 10; .22 INJECTION, SOLUTION INTRAVENOUS at 08:14

## 2020-07-03 ENCOUNTER — ANTICOAGULATION THERAPY VISIT (OUTPATIENT)
Dept: NURSING | Facility: CLINIC | Age: 80
End: 2020-07-03
Payer: MEDICARE

## 2020-07-03 DIAGNOSIS — Z95.2 S/P MITRAL VALVE REPLACEMENT: ICD-10-CM

## 2020-07-03 DIAGNOSIS — I48.0 PAROXYSMAL ATRIAL FIBRILLATION (H): ICD-10-CM

## 2020-07-03 DIAGNOSIS — I48.91 AF (ATRIAL FIBRILLATION) (H): ICD-10-CM

## 2020-07-03 DIAGNOSIS — Z79.01 LONG TERM CURRENT USE OF ANTICOAGULANT THERAPY: ICD-10-CM

## 2020-07-03 DIAGNOSIS — I48.19 PERSISTENT ATRIAL FIBRILLATION (H): ICD-10-CM

## 2020-07-03 DIAGNOSIS — Z79.01 LONG TERM CURRENT USE OF ANTICOAGULANTS WITH INR GOAL OF 2.5-3.5: Primary | ICD-10-CM

## 2020-07-03 DIAGNOSIS — Z95.2 S/P AORTIC VALVE REPLACEMENT: ICD-10-CM

## 2020-07-03 LAB
CAPILLARY BLOOD COLLECTION: NORMAL
INR PPP: 5.4 (ref 0.86–1.14)

## 2020-07-03 PROCEDURE — 99207 ZZC NO CHARGE NURSE ONLY: CPT

## 2020-07-03 PROCEDURE — 36416 COLLJ CAPILLARY BLOOD SPEC: CPT | Performed by: INTERNAL MEDICINE

## 2020-07-03 PROCEDURE — 85610 PROTHROMBIN TIME: CPT | Performed by: INTERNAL MEDICINE

## 2020-07-03 RX ORDER — WARFARIN SODIUM 5 MG/1
TABLET ORAL
Qty: 186 TABLET | Refills: 0 | Status: SHIPPED | OUTPATIENT
Start: 2020-07-03 | End: 2021-05-24

## 2020-07-03 NOTE — PROGRESS NOTES
"Anticoagulation Management    Unable to reach Ralph today.    Today's INR result of 5.4 is supratherapeutic (goal INR of 2.5-3.5).  Result received from: Clinic Lab    Follow up required to confirm warfarin dose taken and assess for changes    Left message to hold warfarin tonight.   Left VM to call Annamarie with transfer to [facility] at: [phone]  Warfarin dosing reviewed by KAYKAY Sams PharmD and maintenance dose reduced about 18%.  Will need to inform patient that 5mg warfarin tablets will be faxed in for better ease/even dosing.      Anticoagulation clinic to follow up    [facility] Sampson RN    ANTICOAGULATION FOLLOW-UP CLINIC VISIT    Patient Name:  Fausto Farr  Date:  7/3/2020  Contact Type:  Telephone--spoke to pt    SUBJECTIVE:  Patient Findings     Positives:   Change in health (Still having soft BM's about 2-3 times per day. Pt states he is eating yogurt and drinks \"a lot of coffee\".  I advised pt discontinue dairy and reduce amount of coffee to see if stool consistency improves.  I also encouraged pt to drink water.)    Comments:   Patient informed 5mg warfarin faxed in, will need those as we continue to reduce his weekly warfarin dose.        Clinical Outcomes     Negatives:   Major bleeding event, Thromboembolic event, Anticoagulation-related hospital admission, Anticoagulation-related ED visit, Anticoagulation-related fatality    Comments:   Patient informed 5mg warfarin faxed in, will need those as we continue to reduce his weekly warfarin dose.           OBJECTIVE    Recent labs: (last 7 days)     20  0916   INR 5.40*       ASSESSMENT / PLAN  INR assessment SUPRA    Recheck INR In: 3 DAYS    INR Location Clinic    Vit K given? None      Anticoagulation Summary  As of 7/3/2020    INR goal:   2.5-3.5   TTR:   59.8 % (10.9 mo)   INR used for dosin.40! (7/3/2020)   Warfarin maintenance plan:   12.5 mg (10 mg x 1 and 5 mg x 0.5) every Mon, Wed, Fri; 10 mg (10 mg x 1) all other days   Full " warfarin instructions:   7/3: Hold; 7/4: 10 mg; Otherwise 12.5 mg every Mon, Wed, Fri; 10 mg all other days   Weekly warfarin total:   77.5 mg   Plan last modified:   [facility] Sampson RN (7/3/2020)   Next INR check:   7/6/2020   Priority:   Critical   Target end date:   Indefinite    Indications    AF (atrial fibrillation) (H) [I48.91]  S/P mitral valve replacement [Z95.2]  Long term current use of anticoagulant therapy [Z79.01]  Persistent atrial fibrillation (H) [I48.19]  S/P aortic valve replacement [Z95.2]             Anticoagulation Episode Summary     INR check location:       Preferred lab:   EXTERNAL LAB    Send INR reminders to:   SHANNA DOWELL    Comments:   3/16 Started rifampin x 3 months (requires significantly higher dose of warfarin d/t interaction)       Anticoagulation Care Providers     Provider Role Specialty Phone number    Jodi Flower Mai, MD Referring Internal Medicine [phone]            See the Encounter Report to view Anticoagulation Flowsheet and Dosing Calendar (Go to Encounters tab in chart review, and find the Anticoagulation Therapy Visit)        [facility] Sampson, RN

## 2020-07-06 ENCOUNTER — ANTICOAGULATION THERAPY VISIT (OUTPATIENT)
Dept: NURSING | Facility: CLINIC | Age: 80
End: 2020-07-06

## 2020-07-06 DIAGNOSIS — Z95.2 S/P MITRAL VALVE REPLACEMENT: ICD-10-CM

## 2020-07-06 DIAGNOSIS — Z79.01 LONG TERM CURRENT USE OF ANTICOAGULANT THERAPY: Primary | ICD-10-CM

## 2020-07-06 DIAGNOSIS — I48.91 AF (ATRIAL FIBRILLATION) (H): ICD-10-CM

## 2020-07-06 DIAGNOSIS — Z95.2 S/P AORTIC VALVE REPLACEMENT: ICD-10-CM

## 2020-07-06 DIAGNOSIS — Z79.01 LONG TERM CURRENT USE OF ANTICOAGULANT THERAPY: ICD-10-CM

## 2020-07-06 DIAGNOSIS — I48.19 PERSISTENT ATRIAL FIBRILLATION (H): ICD-10-CM

## 2020-07-06 DIAGNOSIS — I48.0 PAROXYSMAL ATRIAL FIBRILLATION (H): ICD-10-CM

## 2020-07-06 LAB — INR PPP: 3.1 (ref 0.86–1.14)

## 2020-07-06 PROCEDURE — 85610 PROTHROMBIN TIME: CPT | Performed by: INTERNAL MEDICINE

## 2020-07-06 PROCEDURE — 36416 COLLJ CAPILLARY BLOOD SPEC: CPT | Performed by: INTERNAL MEDICINE

## 2020-07-06 PROCEDURE — 99207 ZZC NO CHARGE NURSE ONLY: CPT

## 2020-07-06 NOTE — PROGRESS NOTES
ANTICOAGULATION FOLLOW-UP     Patient Name:  Fausto Farr  Date:  7/6/2020  Contact Type:  Telephone    SUBJECTIVE:  Patient Findings     Comments:   The patient was assessed for   diet, medication,   missed or extra doses,   bruising or bleeding,   with no problem findings.  No questions or concerns.  Reviewed previous warfarin dosing with patient.          Clinical Outcomes     Comments:   The patient was assessed for   diet, medication,   missed or extra doses,   bruising or bleeding,   with no problem findings.  No questions or concerns.  Reviewed previous warfarin dosing with patient.             OBJECTIVE    Recent labs: (last 7 days)     07/06/20  0915   INR 3.10*       ASSESSMENT / PLAN  INR assessment THER    Recheck INR In: 1 WEEK    INR Location Clinic lab     Anticoagulation Summary  As of 7/6/2020    INR goal:   2.5-3.5   TTR:   59.1 % (10.9 mo)   INR used for dosing:   3.10 (7/6/2020)   Warfarin maintenance plan:   12.5 mg (10 mg x 1 and 5 mg x 0.5) every Mon, Wed, Fri; 10 mg (10 mg x 1) all other days   Full warfarin instructions:   12.5 mg every Mon, Wed, Fri; 10 mg all other days   Weekly warfarin total:   77.5 mg   No change documented:   Caryn Campos RN   Plan last modified:   [facility] Sampson RN (7/3/2020)   Next INR check:   7/13/2020   Priority:   High   Target end date:   Indefinite    Indications    AF (atrial fibrillation) (H) [I48.91]  S/P mitral valve replacement [Z95.2]  Long term current use of anticoagulant therapy [Z79.01]  Persistent atrial fibrillation (H) [I48.19]  S/P aortic valve replacement [Z95.2]             Anticoagulation Episode Summary     INR check location:       Preferred lab:   EXTERNAL LAB    Send INR reminders to:   SHANNA DOWELL    Comments:   3/16 Started rifampin x 3 months (requires significantly higher dose of warfarin d/t interaction)       Anticoagulation Care Providers     Provider Role Specialty Phone number    Jodi Flower Mai, MD Referring  Internal Medicine [phone]            See the Encounter Report to view Anticoagulation Flowsheet and Dosing Calendar (Go to Encounters tab in chart review, and find the Anticoagulation Therapy Visit)    INR is supratherapeutic today at 3.1.   Patient will take maintenance dose.   Follow up in 1 week.       Caryn Campos RN

## 2020-07-07 ENCOUNTER — TELEPHONE (OUTPATIENT)
Dept: CARDIOLOGY | Facility: CLINIC | Age: 80
End: 2020-07-07

## 2020-07-07 DIAGNOSIS — I38 VALVULAR HEART DISEASE: Primary | ICD-10-CM

## 2020-07-07 NOTE — TELEPHONE ENCOUNTER
"Called patient with the below results per Dr. Vasquez request. Order placed for 24-hour holter monitor and follow-up with Anabell Xiao. Patient stated that he understood the results and follow-up plan. Will message scheduling to see if they will be able to call patient to help him get scheduled as I am unable to transfer him to scheduling today.       \"EF and mechanical valve function remains stable.  Please let him know.   Please order 24-hour Holter in 3 months and see Anabell thereafter.   Thx, DI\"        "

## 2020-07-09 DIAGNOSIS — R33.9 URINARY RETENTION: ICD-10-CM

## 2020-07-09 RX ORDER — TAMSULOSIN HYDROCHLORIDE 0.4 MG/1
CAPSULE ORAL
Qty: 90 CAPSULE | Refills: 3 | Status: SHIPPED | OUTPATIENT
Start: 2020-07-09 | End: 2021-07-08

## 2020-07-09 NOTE — TELEPHONE ENCOUNTER
Routing refill request to provider for review/approval because:  Labs out of range:  BP    BP Readings from Last 3 Encounters:   03/25/20 (!) 141/61   03/24/20 113/55   03/23/20 117/71     Kaveh RYDER RN, BSN

## 2020-07-13 ENCOUNTER — ANTICOAGULATION THERAPY VISIT (OUTPATIENT)
Dept: NURSING | Facility: CLINIC | Age: 80
End: 2020-07-13

## 2020-07-13 DIAGNOSIS — Z95.2 S/P MITRAL VALVE REPLACEMENT: ICD-10-CM

## 2020-07-13 DIAGNOSIS — Z79.01 LONG TERM CURRENT USE OF ANTICOAGULANT THERAPY: Primary | ICD-10-CM

## 2020-07-13 DIAGNOSIS — I48.0 PAROXYSMAL ATRIAL FIBRILLATION (H): ICD-10-CM

## 2020-07-13 DIAGNOSIS — I48.19 PERSISTENT ATRIAL FIBRILLATION (H): ICD-10-CM

## 2020-07-13 DIAGNOSIS — Z79.01 LONG TERM CURRENT USE OF ANTICOAGULANT THERAPY: ICD-10-CM

## 2020-07-13 DIAGNOSIS — I48.91 AF (ATRIAL FIBRILLATION) (H): ICD-10-CM

## 2020-07-13 DIAGNOSIS — Z95.2 S/P AORTIC VALVE REPLACEMENT: ICD-10-CM

## 2020-07-13 LAB — INR PPP: 6.1 (ref 0.86–1.14)

## 2020-07-13 PROCEDURE — 36416 COLLJ CAPILLARY BLOOD SPEC: CPT | Performed by: INTERNAL MEDICINE

## 2020-07-13 PROCEDURE — 85610 PROTHROMBIN TIME: CPT | Performed by: INTERNAL MEDICINE

## 2020-07-13 PROCEDURE — 99207 ZZC NO CHARGE NURSE ONLY: CPT

## 2020-07-13 NOTE — PROGRESS NOTES
Discussed case with ACC RN, Rifampin stopped a little over 1 month ago, still seeing after effects as  levels return to baseline.  Suggest 1 day hold only since tends to drop well after 1 day hold, and last time help 2 days, over corrected.      Suggest ~30% dose decrease to get closer to previous maintenance dose, keeping in mind warfarin needs may be slightly higher if chronic infection is resolved now, and was previously influencing warfarin needs.    Flaquita Rios, PharmD BCACP  Anticoagulation Clinical Pharmacist

## 2020-07-13 NOTE — PROGRESS NOTES
ANTICOAGULATION FOLLOW-UP     Patient Name:  Fausto Farr  Date:  2020  Contact Type:  Telephone    SUBJECTIVE:  Patient Findings     Positives:   Change in alcohol use (Had 2 beers on Saturday which he usually doesn't do.), Missed doses (Missed 10 mg dose on Thursday, so he took it with his 12.5 mg dose on Friday for a total of 22.5 mg.)    Comments:   Reviewed previous warfarin dosing with patient.  Patient denies: illness, inflammation,   bleeding/bruises, medication changes, diet changes    Discussed dosing with Kristina Issa, Anticoagulation Clinical Pharmacist.    INR is supratherapeutic today.   Patient will hold dose today and then decrease weekly maintenance dose to 7.5 mg daily.  Will follow up in 4 days.            Clinical Outcomes     Comments:   Reviewed previous warfarin dosing with patient.  Patient denies: illness, inflammation,   bleeding/bruises, medication changes, diet changes    Discussed dosing with Kristina Issa, Anticoagulation Clinical Pharmacist.    INR is supratherapeutic today.   Patient will hold dose today and then decrease weekly maintenance dose to 7.5 mg daily.  Will follow up in 4 days.               OBJECTIVE    Recent labs: (last 7 days)     20  0901   INR 6.10*       ASSESSMENT / PLAN  INR assessment SUPRA    Recheck INR In: 4 DAYS    INR Location Clinic lab     Anticoagulation Summary  As of 2020    INR goal:   2.5-3.5   TTR:   59.2 % (10.9 mo)   INR used for dosin.10! (2020)   Warfarin maintenance plan:   7.5 mg (5 mg x 1.5) every day   Full warfarin instructions:   : Hold; Otherwise 7.5 mg every day   Weekly warfarin total:   52.5 mg   Plan last modified:   Caryn Campos, RN (2020)   Next INR check:   2020   Priority:   High   Target end date:   Indefinite    Indications    AF (atrial fibrillation) (H) [I48.91]  S/P mitral valve replacement [Z95.2]  Long term current use of anticoagulant  therapy [Z79.01]  Persistent atrial fibrillation (H) [I48.19]  S/P aortic valve replacement [Z95.2]             Anticoagulation Episode Summary     INR check location:       Preferred lab:   EXTERNAL LAB    Send INR reminders to:   SHANNA DOWELL    Comments:   3/16 Started rifampin x 3 months (requires significantly higher dose of warfarin d/t interaction)       Anticoagulation Care Providers     Provider Role Specialty Phone number    Jodi Flower Mai, MD Referring Internal Medicine [phone]            See the Encounter Report to view Anticoagulation Flowsheet and Dosing Calendar (Go to Encounters tab in chart review, and find the Anticoagulation Therapy Visit)    INR is supratherapeutic today.   Patient will hold dose today and then decrease weekly maintenance dose to 7.5 mg daily.  Will follow up in 4 days.    Dosage adjustment made based on physician directed care plan.        Caryn Campos RN

## 2020-07-17 ENCOUNTER — ANTICOAGULATION THERAPY VISIT (OUTPATIENT)
Dept: NURSING | Facility: CLINIC | Age: 80
End: 2020-07-17

## 2020-07-17 DIAGNOSIS — I48.0 PAROXYSMAL ATRIAL FIBRILLATION (H): ICD-10-CM

## 2020-07-17 DIAGNOSIS — I48.91 AF (ATRIAL FIBRILLATION) (H): ICD-10-CM

## 2020-07-17 DIAGNOSIS — Z95.2 S/P MITRAL VALVE REPLACEMENT: ICD-10-CM

## 2020-07-17 DIAGNOSIS — Z95.2 S/P AORTIC VALVE REPLACEMENT: ICD-10-CM

## 2020-07-17 DIAGNOSIS — I48.19 PERSISTENT ATRIAL FIBRILLATION (H): ICD-10-CM

## 2020-07-17 DIAGNOSIS — Z79.01 LONG TERM CURRENT USE OF ANTICOAGULANT THERAPY: ICD-10-CM

## 2020-07-17 DIAGNOSIS — I38 VALVULAR HEART DISEASE: ICD-10-CM

## 2020-07-17 LAB
CAPILLARY BLOOD COLLECTION: NORMAL
INR PPP: 3.3 (ref 0.86–1.14)

## 2020-07-17 PROCEDURE — 36416 COLLJ CAPILLARY BLOOD SPEC: CPT | Performed by: INTERNAL MEDICINE

## 2020-07-17 PROCEDURE — 85610 PROTHROMBIN TIME: CPT | Performed by: INTERNAL MEDICINE

## 2020-07-17 NOTE — PROGRESS NOTES
Anticoagulation Management    Unable to reach Ralph today.    Today's INR result of 3.3 is therapeutic (goal INR of 2.5-3.5).  Result received from: Clinic Lab    Follow up required to confirm warfarin dose taken and assess for changes. Ralph stopped rifampin early June. Maintenance dose reduced at last INR. Looks good for now if nothing else has changed. Next INR already scheduled for 1 week out before his wellness visit with PCP - patient needs to be notified.    Pt to call  INR at [phone]; if no answer then call [facility] at [phone].    Anticoagulation clinic to follow up    Cecy Woodard RN

## 2020-07-17 NOTE — PROGRESS NOTES
ANTICOAGULATION MANAGEMENT     Patient Name:  Fausto Farr  Date:  7/17/2020    ASSESSMENT /SUBJECTIVE:    Today's INR result of 3.30 is therapeutic. Goal INR of 2.5-3.5      Warfarin dose taken: Warfarin taken as previously instructed    Diet: No new diet changes affecting INR    Medication changes/ interactions: No new medications/supplements affecting INR    Previous INR: Supratherapeutic     S/S of bleeding or thromboembolism: No    New injury or illness: No    Upcoming surgery, procedure or cardioversion: No    Additional findings: None      PLAN:    Spoke with Fausto regarding INR result and instructed:     Warfarin Dosing Instructions: Continue your current warfarin dose 7.5 mg daily    Instructed patient to follow up no later than: 1 week  Lab visit scheduled    Education provided: None required      Ralph verbalizes understanding and agrees to warfarin dosing plan.    Instructed to call the Anticoagulation Clinic for any changes, questions or concerns. (#[phone])        Nemo Dobson RN      OBJECTIVE:  Recent labs: (last 7 days)     07/13/20  0901 07/17/20  0910   INR 6.10* 3.30*         No question data found.  Anticoagulation Summary  As of 7/17/2020    INR goal:   2.5-3.5   TTR:   59.3 % (10.9 mo)   INR used for dosing:   3.30 (7/17/2020)   Warfarin maintenance plan:   7.5 mg (5 mg x 1.5) every day   Full warfarin instructions:   7.5 mg every day   Weekly warfarin total:   52.5 mg   Plan last modified:   Caryn Campos RN (7/13/2020)   Next INR check:      Priority:   High   Target end date:   Indefinite    Indications    AF (atrial fibrillation) (H) [I48.91]  S/P mitral valve replacement [Z95.2]  Long term current use of anticoagulant therapy [Z79.01]  Persistent atrial fibrillation (H) [I48.19]  S/P aortic valve replacement [Z95.2]             Anticoagulation Episode Summary     INR check location:       Preferred lab:   [facility]TER[facility]    Send INR reminders to:   SHANNA DOWELL     Comments:   Rifampin stopped early June 2020 - will need less warfarin with time.          Anticoagulation Care Providers     Provider Role Specialty Phone number    Jodi Flower Mai, MD Referring Internal Medicine [phone]

## 2020-07-21 ENCOUNTER — TELEPHONE (OUTPATIENT)
Dept: PEDIATRICS | Facility: CLINIC | Age: 80
End: 2020-07-21

## 2020-07-21 NOTE — TELEPHONE ENCOUNTER
Panel Management Review        Summary:    Patient is due/failing the following:   AWV    Action needed:   Needs above.    Type of outreach:    None, already scheduled.     Questions for provider review:    None                                                                                                                                    ORA WATSON MA on 7/21/2020 at 6:10 PM

## 2020-07-24 ENCOUNTER — TELEPHONE (OUTPATIENT)
Dept: PEDIATRICS | Facility: CLINIC | Age: 80
End: 2020-07-24

## 2020-07-24 ENCOUNTER — OFFICE VISIT (OUTPATIENT)
Dept: PEDIATRICS | Facility: CLINIC | Age: 80
End: 2020-07-24
Payer: MEDICARE

## 2020-07-24 ENCOUNTER — ANTICOAGULATION THERAPY VISIT (OUTPATIENT)
Dept: NURSING | Facility: CLINIC | Age: 80
End: 2020-07-24
Payer: MEDICARE

## 2020-07-24 VITALS
TEMPERATURE: 98.2 F | SYSTOLIC BLOOD PRESSURE: 124 MMHG | DIASTOLIC BLOOD PRESSURE: 66 MMHG | WEIGHT: 211.4 LBS | OXYGEN SATURATION: 98 % | HEART RATE: 68 BPM | RESPIRATION RATE: 12 BRPM | BODY MASS INDEX: 34.64 KG/M2

## 2020-07-24 DIAGNOSIS — Z00.00 ENCOUNTER FOR MEDICARE ANNUAL WELLNESS EXAM: Primary | ICD-10-CM

## 2020-07-24 DIAGNOSIS — Z95.2 S/P AORTIC VALVE REPLACEMENT: ICD-10-CM

## 2020-07-24 DIAGNOSIS — Z95.2 S/P MITRAL VALVE REPLACEMENT: ICD-10-CM

## 2020-07-24 DIAGNOSIS — I48.0 PAROXYSMAL ATRIAL FIBRILLATION (H): ICD-10-CM

## 2020-07-24 DIAGNOSIS — E66.01 MORBID OBESITY (H): ICD-10-CM

## 2020-07-24 DIAGNOSIS — Z79.01 LONG TERM CURRENT USE OF ANTICOAGULANT THERAPY: ICD-10-CM

## 2020-07-24 DIAGNOSIS — Z95.5 STENTED CORONARY ARTERY: ICD-10-CM

## 2020-07-24 DIAGNOSIS — K51.20 ULCERATIVE PROCTITIS WITHOUT COMPLICATION (H): ICD-10-CM

## 2020-07-24 DIAGNOSIS — I48.91 AF (ATRIAL FIBRILLATION) (H): ICD-10-CM

## 2020-07-24 DIAGNOSIS — E78.2 MIXED HYPERLIPIDEMIA: ICD-10-CM

## 2020-07-24 DIAGNOSIS — I48.19 PERSISTENT ATRIAL FIBRILLATION (H): ICD-10-CM

## 2020-07-24 DIAGNOSIS — I71.40 ABDOMINAL AORTIC ANEURYSM (AAA) WITHOUT RUPTURE (H): ICD-10-CM

## 2020-07-24 DIAGNOSIS — M62.89 MUSCLE STIFFNESS: ICD-10-CM

## 2020-07-24 DIAGNOSIS — Z96.651 STATUS POST TOTAL RIGHT KNEE REPLACEMENT: ICD-10-CM

## 2020-07-24 LAB
CAPILLARY BLOOD COLLECTION: NORMAL
INR PPP: 3.5 (ref 0.86–1.14)

## 2020-07-24 PROCEDURE — 99207 ZZC NO CHARGE NURSE ONLY: CPT

## 2020-07-24 PROCEDURE — 36416 COLLJ CAPILLARY BLOOD SPEC: CPT | Performed by: INTERNAL MEDICINE

## 2020-07-24 PROCEDURE — 85610 PROTHROMBIN TIME: CPT | Performed by: INTERNAL MEDICINE

## 2020-07-24 PROCEDURE — G0439 PPPS, SUBSEQ VISIT: HCPCS | Performed by: INTERNAL MEDICINE

## 2020-07-24 PROCEDURE — 99214 OFFICE O/P EST MOD 30 MIN: CPT | Mod: 25 | Performed by: INTERNAL MEDICINE

## 2020-07-24 RX ORDER — MESALAMINE 800 MG/1
1 TABLET, DELAYED RELEASE ORAL 2 TIMES DAILY
Qty: 180 TABLET | Refills: 3 | Status: SHIPPED | OUTPATIENT
Start: 2020-07-24 | End: 2021-07-26

## 2020-07-24 RX ORDER — METHOCARBAMOL 750 MG/1
750 TABLET, FILM COATED ORAL 4 TIMES DAILY PRN
Qty: 30 TABLET | Refills: 3 | Status: SHIPPED | OUTPATIENT
Start: 2020-07-24 | End: 2020-11-02

## 2020-07-24 RX ORDER — ROSUVASTATIN CALCIUM 40 MG/1
40 TABLET, COATED ORAL DAILY
Qty: 90 TABLET | Refills: 3 | Status: SHIPPED | OUTPATIENT
Start: 2020-07-24 | End: 2021-07-26

## 2020-07-24 RX ORDER — EZETIMIBE 10 MG/1
10 TABLET ORAL DAILY
Qty: 90 TABLET | Refills: 3 | Status: SHIPPED | OUTPATIENT
Start: 2020-07-24 | End: 2021-07-26

## 2020-07-24 ASSESSMENT — ACTIVITIES OF DAILY LIVING (ADL): CURRENT_FUNCTION: NO ASSISTANCE NEEDED

## 2020-07-24 NOTE — PATIENT INSTRUCTIONS
Patient Education   Personalized Prevention Plan  You are due for the preventive services outlined below.  Your care team is available to assist you in scheduling these services.  If you have already completed any of these items, please share that information with your care team to update in your medical record.  Health Maintenance Due   Topic Date Due     URINE DRUG SCREEN  1940     Zoster (Shingles) Vaccine (2 of 3) 02/17/2009     Heart Failure Action Plan  07/18/2019     Annual Wellness Visit  05/30/2020     FALL RISK ASSESSMENT  05/30/2020     Preventive Health Recommendations  See your health care provider every year to    Review health changes.     Discuss preventive care.      Review your medicines if your doctor has prescribed any.    Talk with your health care provider about whether you should have a test to screen for prostate cancer (PSA).    Every 3 years, have a diabetes test (fasting glucose). If you are at risk for diabetes, you should have this test more often.    Every 5 years, have a cholesterol test. Have this test more often if you are at risk for high cholesterol or heart disease.     Every 10 years, have a colonoscopy. Or, have a yearly FIT test (stool test). These exams will check for colon cancer.    Talk to with your health care provider about screening for Abdominal Aortic Aneurysm if you have a family history of AAA or have a history of smoking.    Shots:     Get a flu shot each year.     Get a tetanus shot every 10 years.     Talk to your doctor about your pneumonia vaccines. There are now two you should receive - Pneumovax (PPSV 23) and Prevnar (PCV 13).    Talk to your pharmacist about a shingles vaccine.     Talk to your doctor about the hepatitis B vaccine.    Nutrition:     Eat at least 5 servings of fruits and vegetables each day.     Eat whole-grain bread, whole-wheat pasta and brown rice instead of white grains and rice.     Get adequate Calcium and Vitamin D.      Lifestyle    Exercise for at least 150 minutes a week (30 minutes a day, 5 days a week). This will help you control your weight and prevent disease.     Limit alcohol to one drink per day.     No smoking.     Wear sunscreen to prevent skin cancer.     See your dentist every six months for an exam and cleaning.     See your eye doctor every 1 to 2 years to screen for conditions such as glaucoma, macular degeneration and cataracts.    Personalized Prevention Plan  You are due for the preventive services outlined below.  Your care team is available to assist you in scheduling these services.  If you have already completed any of these items, please share that information with your care team to update in your medical record.  Health Maintenance Due   Topic Date Due     URINE DRUG SCREEN  1940     Zoster (Shingles) Vaccine (2 of 3) 02/17/2009     Heart Failure Action Plan  07/18/2019     Annual Wellness Visit  05/30/2020     FALL RISK ASSESSMENT  05/30/2020           Shingles  If you are over 50, I recommend the new Shingrix vaccine. Two doses of Shingrix provides more than 90% protection against shingles and postherpetic neuralgia (PHN), a type of chronic pain that is the most common complication of shingles.   - even if you've had the older shingles vaccine (Zostavax) it's okay to get the new vaccine.   - even if you've had singles before the vaccine can be helpful.     You may get your shingles vaccine at the pharmacy downstairs. You do not need an appointment and can walk in any time they are open. The vaccine is a two shot series - you will need a booster shot 2-6 months after the first dose.      For hand arthritis:  voltaren gel - available over the counter

## 2020-07-24 NOTE — TELEPHONE ENCOUNTER
We received a fax from patient's [facility], [facility] [address] [place] regarding methocarbamol (ROBAXIN) 750 MG tablet prescription.     Fax states that prescription is not covered by patient's insurance plan. The preferred alternative is Tizanidine or Cyclobenzaprine.      Routing to provider if either of the preferred alternatives are acceptable options or if we should initiate a PA.    ORA WATSON MA on 7/24/2020 at 4:24 PM

## 2020-07-24 NOTE — PROGRESS NOTES
"  SUBJECTIVE:   Fausto Farr is a 80 year old male who presents for Preventive Visit.    Are you in the first 12 months of your Medicare coverage?  No    Healthy Habits:    In general, how would you rate your overall health?  Good    Frequency of exercise:  6-7 days/week    Duration of exercise:  15-30 minutes    Do you usually eat at least 4 servings of fruit and vegetables a day, include whole grains    & fiber and avoid regularly eating high fat or \"junk\" foods?  Yes    Taking medications regularly:  Yes    Barriers to taking medications:  None    Medication side effects:  Other    Ability to successfully perform activities of daily living:  No assistance needed    Home Safety:  No safety concerns identified    Hearing Impairment:  No hearing concerns    In the past 6 months, have you been bothered by leaking of urine?  No    In general, how would you rate your overall mental or emotional health?  Very good      PHQ-2 Total Score:    Additional concerns today:  Yes    Do you feel safe in your environment? Yes    Have you ever done Advance Care Planning? (For example, a Health Directive, POLST, or a discussion with a medical provider or your loved ones about your wishes): Yes, advance care planning is on file.      Fall risk  Fallen 2 or more times in the past year?: No  Any fall with injury in the past year?: No    Cognitive Screening   1) Repeat 3 items (Leader, Season, Table)    2) Clock draw: NORMAL  3) 3 item recall: Recalls 1 object   Results: NORMAL clock, 1-2 items recalled: COGNITIVE IMPAIRMENT LESS LIKELY    Mini-CogTM Copyright CARMELO Cedillo. Licensed by the author for use in [facility]; reprinted with permission (sandi@.[facility]). All rights reserved.          Reviewed and updated as needed this visit by clinical staff  Tobacco  Allergies  Med Hx  Surg Hx  Fam Hx  Soc Hx        Reviewed and updated as needed this visit by Provider        Social History     Tobacco Use     Smoking status: " Former Smoker     Packs/day: 1.00     Years: 40.00     Pack years: 40.00     Start date: 4/15/1962     Last attempt to quit: 10/23/2002     Years since quittin.7     Smokeless tobacco: Never Used   Substance Use Topics     Alcohol use: Yes     Frequency: 2-4 times a month     Drinks per session: 1 or 2     Comment: a couple beers per week (socially)     If you drink alcohol do you typically have >3 drinks per day or >7 drinks per week? No    Alcohol Use 2018   Prescreen: >3 drinks/day or >7 drinks/week? No   Prescreen: >3 drinks/day or >7 drinks/week? -       Current providers sharing in care for this patient include:   Patient Care Team:  Jodi Flower Mai, MD as PCP - General (Internal Medicine)  Calderon Santo MD as MD (Cardiology)  Elder Dao APRN CNP as Nurse Practitioner (Nurse Practitioner)  Walt Garcia MD as MD (Orthopedics)  Jodi Flower Mai, MD as Assigned PCP    The following health maintenance items are reviewed in Epic and correct as of today:  Health Maintenance   Topic Date Due     URINE DRUG SCREEN  1940     ZOSTER IMMUNIZATION (2 of 3) 2009     HF ACTION PLAN  2019     MEDICARE ANNUAL WELLNESS VISIT  2020     FALL RISK ASSESSMENT  2020     INFLUENZA VACCINE (1) 2020     ADVANCE CARE PLANNING  10/09/2020     BMP  2020     ANNUAL REVIEW OF HM ORDERS  2021     ALT  2021     LIPID  2021     CBC  06/15/2021     DTAP/TDAP/TD IMMUNIZATION (4 - Td) 2027     TSH W/FREE T4 REFLEX  Completed     PHQ-2  Completed     PNEUMOCOCCAL IMMUNIZATION 65+ LOW/MEDIUM RISK  Completed     IPV IMMUNIZATION  Aged Out     MENINGITIS IMMUNIZATION  Aged Out     HEPATITIS B IMMUNIZATION  Aged Out     PMH:    CAD s/p CABG   ? On ASA, beta blocker, crestor, ezetemibe (stopped furosemide on own)    S/p aortic and mitral valve replacement (mechanical)  ? On warfarin    New heart block, monitored by cardiology, delayed pacemaker, now  "reconsidering insertion - plan is to repeat holter monitor 3 months from last check    Atrial fibrillation and flutter    HTN    Systolic congestive heart failure (ICM)    Triple A - followed by vascular surgery - yearly surveillance    S/p total knee replacement June 2019 with delayed wound healing and long term antibiotics    S/p lumbar fusion surgery    Ulcerative colitis on mesalamine (prescribed by PCP)    Prostate Cancer    Labs reviewed in EPIC    Review of Systems  All other systems on a 10-point review are negative, unless otherwise noted in HPI      OBJECTIVE:   /66 (BP Location: Right arm, Patient Position: Sitting, Cuff Size: Adult Large)   Pulse 68   Temp 98.2  F (36.8  C)   Resp 12   Wt 95.9 kg (211 lb 6.4 oz)   SpO2 98%   BMI 34.64 kg/m   Estimated body mass index is 34.64 kg/m  as calculated from the following:    Height as of 2/10/20: 1.664 m (5' 5.5\").    Weight as of this encounter: 95.9 kg (211 lb 6.4 oz).  Physical Exam  GENERAL: healthy, alert and no distress  EYES: Eyes grossly normal to inspection, PERRL and conjunctivae and sclerae normal  HENT: ear canals and TM's normal, nose and mouth without ulcers or lesions  NECK: no adenopathy, no asymmetry, masses, or scars and thyroid normal to palpation  RESP: lungs clear to auscultation - no rales, rhonchi or wheezes  CV: regular rate and rhythm, normal S1 S2, no S3 or S4, no murmur, click or rub, no peripheral edema and peripheral pulses strong  ABDOMEN: soft, nontender, no hepatosplenomegaly, no masses and bowel sounds normal  MS: no gross musculoskeletal defects noted, healing scar over right knee and right calf s/p graft  SKIN: no suspicious lesions or rashes  NEURO: Normal strength and tone, mentation intact and speech normal  PSYCH: mentation appears normal, affect normal/bright    Diagnostic Test Results:  Labs reviewed in Epic    ASSESSMENT / PLAN:   1. Encounter for Medicare annual wellness exam  80 here for annual preventive " health physical.  Reviewed healthy BP and BMI ranges.  Counseled on lifestyle modifications for optimal mental and physical health.  Discussed age-appropriate health maintanence.  Additional concerns addressed.    2. Mixed hyperlipidemia  Refilled medications, labs stable.  - ezetimibe (ZETIA) 10 MG tablet; Take 1 tablet (10 mg) by mouth daily  Dispense: 90 tablet; Refill: 3  - rosuvastatin (CRESTOR) 40 MG tablet; Take 1 tablet (40 mg) by mouth daily  Dispense: 90 tablet; Refill: 3    3. 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  Stable and doing well.  - mesalamine (ASACOL HD) 800 MG EC tablet; Take 1 tablet (800 mg) by mouth 2 times daily  Dispense: 180 tablet; Refill: 3    4. Abdominal aortic aneurysm (AAA) without rupture (H)  S/p repair  Due for annual CT without contrast in September - ordered by vascular surgeon.    5. Muscle stiffness  Takes robaxin prn to help with decreased ROM of knee - finds this helpful.  Will continue.  - methocarbamol (ROBAXIN) 750 MG tablet; Take 1 tablet (750 mg) by mouth 4 times daily as needed for muscle spasms  Dispense: 30 tablet; Refill: 3    6. Persistent atrial fibrillation (H)  On anticoagulation, followed by INR clinic.    7. Stented coronary artery  On beta blocker and statin, is not on ACE/ARB.  Is followed by cardiology for ongoing conditions.    8. S/P aortic valve replacement  9. S/P mitral valve replacement  See #6, on anticoagulation followed in INR clinic.    10. Status post total right knee replacement  With multiple complications and non-healing wound.  S/p long term antibiotic therapy, now on oral augmentin for prevention indefinitely.  Is followed by ID.  Was pending pacemaker insertion by cardiology for new heart block, but delayed due to infectious complications of prosthetic knee - is now being monitored by holter monitors and cards reassessing need for pacemaker.    11. Morbid obesity (H)  Has had some weight gain, in part due to  "immobility of right knee.  Is exercising as tolerated now and monitoring diet.      COUNSELING:  Reviewed preventive health counseling, as reflected in patient instructions    Estimated body mass index is 34.64 kg/m  as calculated from the following:    Height as of 2/10/20: 1.664 m (5' 5.5\").    Weight as of this encounter: 95.9 kg (211 lb 6.4 oz).    Weight management plan: Discussed healthy diet and exercise guidelines     reports that he quit smoking about 17 years ago. He started smoking about 58 years ago. He has a 40.00 pack-year smoking history. He has never used smokeless tobacco.      Appropriate preventive services were discussed with this patient, including applicable screening as appropriate for cardiovascular disease, diabetes, osteopenia/osteoporosis, and glaucoma.  As appropriate for age/gender, discussed screening for colorectal cancer, prostate cancer, breast cancer, and cervical cancer. Checklist reviewing preventive services available has been given to the patient.    Reviewed patients plan of care and provided an AVS. The Intermediate Care Plan ( asthma action plan, low back pain action plan, and migraine action plan) for Fausto meets the Care Plan requirement. This Care Plan has been established and reviewed with the Patient.    Counseling Resources:  ATP IV Guidelines  Pooled Cohorts Equation Calculator  Breast Cancer Risk Calculator  FRAX Risk Assessment  ICSI Preventive Guidelines  Dietary Guidelines for Americans, 2010  USDA's MyPlate  ASA Prophylaxis  Lung CA Screening    Chris Flower MD  [facility]    Identified Health Risks:  "

## 2020-07-24 NOTE — PROGRESS NOTES
Anticoagulation Management    Unable to reach Ralph today.    Today's INR result of 3.5 is therapeutic (goal INR of 2.5-3.5).  Result received from: Clinic Lab    Follow up required to confirm warfarin dose taken and assess for changes    Patient not home, patient's wife prefers I call him back prior to my departure at 5:00 today.    Anticoagulation clinic to follow up    Aure Sampson RN    ANTICOAGULATION FOLLOW-UP CLINIC VISIT    Patient Name:  Fausto Farr  Date:  7/24/2020  Contact Type:  Telephone- I spoke to pt  SUBJECTIVE:  Patient Findings     Positives:   Change in health (Had a loose stool today after getting back home from clinic visit--pt saw PCP today for annual wellness visit--no changes or concerns noted.), Missed doses (Intentional hold on 07/13 due to supra INR), Change in medications (Rifampin discontinued on 06/09/2020), Change in diet/appetite (Pt eating a small amount of greens everyday from his garden, I emphasized importance of consistency)        Clinical Outcomes     Negatives:   Major bleeding event, Thromboembolic event, Anticoagulation-related hospital admission, Anticoagulation-related ED visit, Anticoagulation-related fatality           OBJECTIVE    Recent labs: (last 7 days)     07/24/20  1419   INR 3.50*       ASSESSMENT / PLAN  INR assessment THER    Recheck INR In: 1 WEEK    INR Location Clinic      Anticoagulation Summary  As of 7/24/2020    INR goal:   2.5-3.5   TTR:   60.5 % (11.1 mo)   INR used for dosing:   3.50 (7/24/2020)   Warfarin maintenance plan:   7.5 mg (5 mg x 1.5) every day   Full warfarin instructions:   7.5 mg every day   Weekly warfarin total:   52.5 mg   Plan last modified:   Aure Sampson RN (7/24/2020)   Next INR check:   7/31/2020   Priority:   High   Target end date:   Indefinite    Indications    AF (atrial fibrillation) (H) [I48.91]  S/P mitral valve replacement [Z95.2]  Long term current use of anticoagulant therapy [Z79.01]  Persistent atrial  fibrillation (H) [I48.19]  S/P aortic valve replacement [Z95.2]             Anticoagulation Episode Summary     INR check location:       Preferred lab:   EXTERNAL LAB    Send INR reminders to:   SHANNA DOWELL    Comments:   Rifampin stopped early June 2020 - will need less warfarin with time.          Anticoagulation Care Providers     Provider Role Specialty Phone number    Jodi Flower Mai, MD Referring Internal Medicine [phone]            See the Encounter Report to view Anticoagulation Flowsheet and Dosing Calendar (Go to Encounters tab in chart review, and find the Anticoagulation Therapy Visit)        Aure Sampson RN

## 2020-07-24 NOTE — TELEPHONE ENCOUNTER
Please call pt and ask if he has tried alternative agents listed.  If not, is he willing to switch?    Chris Flower MD

## 2020-07-27 NOTE — TELEPHONE ENCOUNTER
Called pt. Pt's wife states that she is not sure if he even needs it, because he hasnt been taking it. Pt is wondering if he can have a prescription for Lasix. In the past he has had it, but has some edema going on.

## 2020-07-27 NOTE — TELEPHONE ENCOUNTER
Telephone call placed to patient, left a voice message with request for return call.    When patient calls back:  Relay note from Dr. Flower to the patient.     See note from Cardiology on 7/7/20.  Plan is for patient to have holter monitor and follow up with Cardiology.     Juan Carlos Parmar RN on 7/27/2020 at 3:27 PM

## 2020-07-27 NOTE — TELEPHONE ENCOUNTER
We just discussed this at his visit last week - he has been off of his lasix and his lungs were clear and his LE swelling was stable (he has chronic swelling in the RLE due to ongoing complications with prosthetic knee and recent graft of his calf muscle).  We decided to hold off on the lasix since he has been stable off of it for so many months (and to minimize his medications).      That being said, I did encourage him to follow-up with cardiology after his next holter monitor - for some reason he is off of his ace/arb and has a hx of heart failure that is managed by cards.  Would like their input on this.    Please also advise pt that his yearly abdominal scan was ordered by his vascular surgeon and he will be called about it in the fall probably (he was asking me about this).    Chris Flower MD

## 2020-07-28 NOTE — TELEPHONE ENCOUNTER
Patient left message on Pal direct line giving consent to leave detailed voicemail.    Called patient, spoke with patient.     Relayed note from Dr. Flower to the patient.     Not using robaxin.   Reports he does not need it anymore at this time.   Last used it 3 weeks ago.     Right leg has ongoing swelling.   Tenderness on the front of the leg.  Ongoing for several months.   Pain and mobility are improving. Patient is continuing to do physical therapy.     Left leg does not have swelling.     Plans on having holter monitor at the end of September.     Patient verbalized understanding of plan of care and will call back with questions or concerns.     Juan Carlos Parmar RN on 7/28/2020 at 10:21 AM

## 2020-07-31 ENCOUNTER — ANTICOAGULATION THERAPY VISIT (OUTPATIENT)
Dept: NURSING | Facility: CLINIC | Age: 80
End: 2020-07-31

## 2020-07-31 DIAGNOSIS — I48.0 PAROXYSMAL ATRIAL FIBRILLATION (H): ICD-10-CM

## 2020-07-31 DIAGNOSIS — Z95.2 S/P MITRAL VALVE REPLACEMENT: ICD-10-CM

## 2020-07-31 DIAGNOSIS — Z95.2 S/P AORTIC VALVE REPLACEMENT: ICD-10-CM

## 2020-07-31 DIAGNOSIS — Z79.01 LONG TERM CURRENT USE OF ANTICOAGULANT THERAPY: ICD-10-CM

## 2020-07-31 DIAGNOSIS — I48.91 AF (ATRIAL FIBRILLATION) (H): ICD-10-CM

## 2020-07-31 DIAGNOSIS — Z79.01 LONG TERM CURRENT USE OF ANTICOAGULANT THERAPY: Primary | ICD-10-CM

## 2020-07-31 DIAGNOSIS — I48.19 PERSISTENT ATRIAL FIBRILLATION (H): ICD-10-CM

## 2020-07-31 LAB — INR PPP: 2.8 (ref 0.86–1.14)

## 2020-07-31 PROCEDURE — 99207 ZZC NO CHARGE NURSE ONLY: CPT

## 2020-07-31 PROCEDURE — 85610 PROTHROMBIN TIME: CPT | Performed by: INTERNAL MEDICINE

## 2020-07-31 PROCEDURE — 36416 COLLJ CAPILLARY BLOOD SPEC: CPT | Performed by: INTERNAL MEDICINE

## 2020-07-31 NOTE — PROGRESS NOTES
ANTICOAGULATION FOLLOW-UP     Patient Name:  Fausto Farr  Date:  2020  Contact Type:  Telephone    SUBJECTIVE:  Patient Findings     Comments:   The patient was assessed for   diet, medication,   missed or extra doses,   bruising or bleeding,   with no problem findings.  No questions or concerns.  Reviewed previous warfarin dosing with patient.    He is still eating vegetables out of his garden. Discussed staying consistent with the amount he eats.    INR is therapeutic today.   Patient will continue same maintenance dose.   Follow up in 2 weeks.          Clinical Outcomes     Comments:   The patient was assessed for   diet, medication,   missed or extra doses,   bruising or bleeding,   with no problem findings.  No questions or concerns.  Reviewed previous warfarin dosing with patient.    He is still eating vegetables out of his garden. Discussed staying consistent with the amount he eats.    INR is therapeutic today.   Patient will continue same maintenance dose.   Follow up in 2 weeks.             OBJECTIVE    Recent labs: (last 7 days)     20  0856   INR 2.80*       ASSESSMENT / PLAN  INR assessment THER    Recheck INR In: 2 WEEKS    INR Location Clinic lab     Anticoagulation Summary  As of 2020    INR goal:   2.5-3.5   TTR:   61.7 % (11.2 mo)   INR used for dosin.80 (2020)   Warfarin maintenance plan:   7.5 mg (5 mg x 1.5) every day   Full warfarin instructions:   7.5 mg every day   Weekly warfarin total:   52.5 mg   No change documented:   Caryn Campos, RN   Plan last modified:   Aure Sampson RN (2020)   Next INR check:   2020   Priority:   Maintenance   Target end date:   Indefinite    Indications    AF (atrial fibrillation) (H) [I48.91]  S/P mitral valve replacement [Z95.2]  Long term current use of anticoagulant therapy [Z79.01]  Persistent atrial fibrillation (H) [I48.19]  S/P aortic valve replacement [Z95.2]             Anticoagulation Episode Summary      INR check location:       Preferred lab:   EXTERNAL LAB    Send INR reminders to:   SHANNA DOWELL    Comments:   Rifampin stopped early June 2020 - will need less warfarin with time.          Anticoagulation Care Providers     Provider Role Specialty Phone number    Jodi Flower Mai, MD Referring Internal Medicine [phone]            See the Encounter Report to view Anticoagulation Flowsheet and Dosing Calendar (Go to Encounters tab in chart review, and find the Anticoagulation Therapy Visit)    INR is therapeutic today.   Patient will continue same maintenance dose.   Follow up in 2 weeks.        Caryn Campos RN

## 2020-08-14 ENCOUNTER — ANTICOAGULATION THERAPY VISIT (OUTPATIENT)
Dept: NURSING | Facility: CLINIC | Age: 80
End: 2020-08-14

## 2020-08-14 DIAGNOSIS — Z95.2 S/P MITRAL VALVE REPLACEMENT: ICD-10-CM

## 2020-08-14 DIAGNOSIS — I48.19 PERSISTENT ATRIAL FIBRILLATION (H): ICD-10-CM

## 2020-08-14 DIAGNOSIS — I48.0 PAROXYSMAL ATRIAL FIBRILLATION (H): ICD-10-CM

## 2020-08-14 DIAGNOSIS — I48.91 AF (ATRIAL FIBRILLATION) (H): ICD-10-CM

## 2020-08-14 DIAGNOSIS — Z79.01 LONG TERM CURRENT USE OF ANTICOAGULANT THERAPY: Primary | ICD-10-CM

## 2020-08-14 DIAGNOSIS — Z95.2 S/P AORTIC VALVE REPLACEMENT: ICD-10-CM

## 2020-08-14 DIAGNOSIS — Z79.01 LONG TERM CURRENT USE OF ANTICOAGULANT THERAPY: ICD-10-CM

## 2020-08-14 LAB
CAPILLARY BLOOD COLLECTION: NORMAL
INR PPP: 3.3 (ref 0.86–1.14)

## 2020-08-14 PROCEDURE — 85610 PROTHROMBIN TIME: CPT | Performed by: INTERNAL MEDICINE

## 2020-08-14 PROCEDURE — 36416 COLLJ CAPILLARY BLOOD SPEC: CPT | Performed by: INTERNAL MEDICINE

## 2020-08-14 PROCEDURE — 99207 ZZC NO CHARGE NURSE ONLY: CPT

## 2020-08-14 NOTE — PROGRESS NOTES
ANTICOAGULATION FOLLOW-UP     Patient Name:  Fausto Farr  Date:  8/14/2020  Contact Type:  Telephone    SUBJECTIVE:  Patient Findings     Comments:   The patient was assessed for   diet, medication,   missed or extra doses,   bruising or bleeding,   with no problem findings.  Reviewed previous warfarin dosing with patient.    INR is therapeutic today at 3.3.   Results have been on the higher half of goal on 52.5 mg/wk.  Dose before starting on rifampin was 47.5 mg/wk. (was discontinued on 6/9/20)  Patient will decrease weekly maintenance dose total by 4.8% to 50 mg/wk.  Will follow up in 2 weeks.        Clinical Outcomes     Comments:   The patient was assessed for   diet, medication,   missed or extra doses,   bruising or bleeding,   with no problem findings.  Reviewed previous warfarin dosing with patient.    INR is therapeutic today at 3.3.   Results have been on the higher half of goal on 52.5 mg/wk.  Dose before starting on rifampin was 47.5 mg/wk. (was discontinued on 6/9/20)  Patient will decrease weekly maintenance dose total by 4.8% to 50 mg/wk.  Will follow up in 2 weeks.           OBJECTIVE    Recent labs: (last 7 days)     08/14/20  1041   INR 3.30*       ASSESSMENT / PLAN  INR assessment SUPRA    Recheck INR In: 2 WEEKS    INR Location Clinic lab     Anticoagulation Summary  As of 8/14/2020    INR goal:   2.5-3.5   TTR:   62.9 % (11.2 mo)   INR used for dosing:   3.30 (8/14/2020)   Warfarin maintenance plan:   5 mg (5 mg x 1) every Fri; 7.5 mg (5 mg x 1.5) all other days   Full warfarin instructions:   5 mg every Fri; 7.5 mg all other days   Weekly warfarin total:   50 mg   Plan last modified:   Caryn Campos RN (8/14/2020)   Next INR check:   8/31/2020   Priority:   Maintenance   Target end date:   Indefinite    Indications    AF (atrial fibrillation) (H) [I48.91]  S/P mitral valve replacement [Z95.2]  Long term current use of anticoagulant therapy [Z79.01]  Persistent atrial fibrillation  (H) [I48.19]  S/P aortic valve replacement [Z95.2]             Anticoagulation Episode Summary     INR check location:       Preferred lab:   [facility]    Send INR reminders to:   SHANNA DOWELL    Comments:   Rifampin stopped early June 2020 - will need less warfarin with time.          Anticoagulation Care Providers     Provider Role Specialty Phone number    Jodi Flower Mai, MD Referring Internal Medicine [phone]            See the Encounter Report to view Anticoagulation Flowsheet and Dosing Calendar (Go to Encounters tab in chart review, and find the Anticoagulation Therapy Visit)    INR is therapeutic today at 3.3.   Results have been on the higher half of goal on 52.5 mg/wk.  Dose before starting on rifampin was 47.5 mg/wk. (was discontinued on 6/9/20)  Patient will decrease weekly maintenance dose total by 4.8% to 50 mg/wk.  Will follow up in 2 weeks.    Dosage adjustment made based on physician directed care plan.        Caryn Campos RN

## 2020-08-31 ENCOUNTER — ANTICOAGULATION THERAPY VISIT (OUTPATIENT)
Dept: NURSING | Facility: CLINIC | Age: 80
End: 2020-08-31

## 2020-08-31 DIAGNOSIS — Z95.2 S/P MITRAL VALVE REPLACEMENT: ICD-10-CM

## 2020-08-31 DIAGNOSIS — I48.91 AF (ATRIAL FIBRILLATION) (H): ICD-10-CM

## 2020-08-31 DIAGNOSIS — I48.0 PAROXYSMAL ATRIAL FIBRILLATION (H): ICD-10-CM

## 2020-08-31 DIAGNOSIS — Z95.2 S/P AORTIC VALVE REPLACEMENT: ICD-10-CM

## 2020-08-31 DIAGNOSIS — Z79.01 LONG TERM CURRENT USE OF ANTICOAGULANT THERAPY: ICD-10-CM

## 2020-08-31 DIAGNOSIS — Z79.01 LONG TERM CURRENT USE OF ANTICOAGULANT THERAPY: Primary | ICD-10-CM

## 2020-08-31 DIAGNOSIS — I48.19 PERSISTENT ATRIAL FIBRILLATION (H): ICD-10-CM

## 2020-08-31 LAB
CAPILLARY BLOOD COLLECTION: NORMAL
INR PPP: 5 (ref 0.86–1.14)

## 2020-08-31 PROCEDURE — 99207 ZZC NO CHARGE NURSE ONLY: CPT

## 2020-08-31 PROCEDURE — 85610 PROTHROMBIN TIME: CPT | Performed by: INTERNAL MEDICINE

## 2020-08-31 PROCEDURE — 36416 COLLJ CAPILLARY BLOOD SPEC: CPT | Performed by: INTERNAL MEDICINE

## 2020-08-31 NOTE — PROGRESS NOTES
ANTICOAGULATION FOLLOW-UP     Patient Name:  Fausto Farr  Date:  2020  Contact Type:  Telephone    SUBJECTIVE:  Patient Findings     Positives:   Change in diet/appetite (Eating less green vegetables. His garden is almost done for the season.)    Comments:   Reviewed previous warfarin dosing with patient.  He took warfarin as instructed.  Patient denies: extra doses, illness, inflammation,   bleeding/bruises, medication changes.    INR is supratherapeutic today.   Patient will hold dose today and decrease weekly maintenance dose total by 10%.  Will follow up in 11 days. Protocol is 3 days, but patient declined to come in until 2020.        Clinical Outcomes     Comments:   Reviewed previous warfarin dosing with patient.  He took warfarin as instructed.  Patient denies: extra doses, illness, inflammation,   bleeding/bruises, medication changes.    INR is supratherapeutic today.   Patient will hold dose today and decrease weekly maintenance dose total by 10%.  Will follow up in 11 days. Protocol is 3 days, but patient declined to come in until 2020.           OBJECTIVE    Recent labs: (last 7 days)     20  0804   INR 5.00*       ASSESSMENT / PLAN  INR assessment SUPRA    Recheck INR In: 3 DAYS    INR Location Clinic lab     Anticoagulation Summary  As of 2020    INR goal:   2.5-3.5   TTR:   59.6 % (11.2 mo)   INR used for dosin.00! (2020)   Warfarin maintenance plan:   5 mg (10 mg x 0.5) every Mon, Wed, Fri; 7.5 mg (5 mg x 1.5) all other days   Full warfarin instructions:   : Hold; Otherwise 5 mg every Mon, Wed, Fri; 7.5 mg all other days   Weekly warfarin total:   45 mg   Plan last modified:   Caryn Campos RN (2020)   Next INR check:   2020   Priority:   High   Target end date:   Indefinite    Indications    AF (atrial fibrillation) (H) [I48.91]  S/P mitral valve replacement [Z95.2]  Long term current use of anticoagulant therapy [Z79.01]  Persistent  atrial fibrillation (H) [I48.19]  S/P aortic valve replacement [Z95.2]             Anticoagulation Episode Summary     INR check location:       Preferred lab:   [facility]    Send INR reminders to:   SHANNA DOWELL    Comments:   Rifampin stopped early June 2020 - will need less warfarin with time.          Anticoagulation Care Providers     Provider Role Specialty Phone number    Jodi Flower Mai, MD Referring Internal Medicine [phone]            See the Encounter Report to view Anticoagulation Flowsheet and Dosing Calendar (Go to Encounters tab in chart review, and find the Anticoagulation Therapy Visit)    INR is supratherapeutic today.   Patient will hold dose today and decrease weekly maintenance dose total by 10%.  Will follow up in 11 days. Protocol is 3 days, but patient declined to come in until 9/11/2020.    Dosage adjustment made based on physician directed care plan.        Caryn Campos RN

## 2020-09-11 ENCOUNTER — ANTICOAGULATION THERAPY VISIT (OUTPATIENT)
Dept: ANTICOAGULATION | Facility: CLINIC | Age: 80
End: 2020-09-11

## 2020-09-11 ENCOUNTER — HOSPITAL ENCOUNTER (OUTPATIENT)
Dept: CT IMAGING | Facility: CLINIC | Age: 80
Discharge: HOME | End: 2020-09-11
Attending: RADIOLOGY | Admitting: RADIOLOGY
Payer: MEDICARE

## 2020-09-11 DIAGNOSIS — I71.40 ABDOMINAL AORTIC ANEURYSM (AAA) WITHOUT RUPTURE (H): ICD-10-CM

## 2020-09-11 DIAGNOSIS — I71.40 ABDOMINAL AORTIC ANEURYSM (H): Primary | ICD-10-CM

## 2020-09-11 DIAGNOSIS — I48.91 AF (ATRIAL FIBRILLATION) (H): ICD-10-CM

## 2020-09-11 DIAGNOSIS — Z79.01 LONG TERM CURRENT USE OF ANTICOAGULANT THERAPY: ICD-10-CM

## 2020-09-11 DIAGNOSIS — Z95.2 S/P MITRAL VALVE REPLACEMENT: ICD-10-CM

## 2020-09-11 DIAGNOSIS — I48.0 PAROXYSMAL ATRIAL FIBRILLATION (H): ICD-10-CM

## 2020-09-11 DIAGNOSIS — I48.19 PERSISTENT ATRIAL FIBRILLATION (H): ICD-10-CM

## 2020-09-11 DIAGNOSIS — Z95.2 S/P AORTIC VALVE REPLACEMENT: ICD-10-CM

## 2020-09-11 LAB
CAPILLARY BLOOD COLLECTION: NORMAL
INR PPP: 2.9 (ref 0.86–1.14)

## 2020-09-11 PROCEDURE — 99207 ZZC NO CHARGE NURSE ONLY: CPT | Performed by: INTERNAL MEDICINE

## 2020-09-11 PROCEDURE — 74150 CT ABDOMEN W/O CONTRAST: CPT

## 2020-09-11 PROCEDURE — 85610 PROTHROMBIN TIME: CPT | Performed by: INTERNAL MEDICINE

## 2020-09-11 PROCEDURE — 36416 COLLJ CAPILLARY BLOOD SPEC: CPT | Performed by: INTERNAL MEDICINE

## 2020-09-11 NOTE — PROGRESS NOTES
ANTICOAGULATION FOLLOW-UP CLINIC VISIT    Patient Name:  Fausto Farr  Date:  2020  Contact Type:  Telephone/ discussed with patient    SUBJECTIVE:  Patient Findings     Comments:   The patient was assessed for diet, medication, and activity level changes, missed or extra doses, bruising or bleeding, with no problem findings.  Patient has had about the same amount of green veggies since last INR check (no longer has fresh veggies from his garden).  Will continue maintenance dose set at last check.        Clinical Outcomes     Negatives:   Major bleeding event, Thromboembolic event, Anticoagulation-related hospital admission, Anticoagulation-related ED visit, Anticoagulation-related fatality    Comments:   The patient was assessed for diet, medication, and activity level changes, missed or extra doses, bruising or bleeding, with no problem findings.  Patient has had about the same amount of green veggies since last INR check (no longer has fresh veggies from his garden).  Will continue maintenance dose set at last check.           OBJECTIVE    Recent labs: (last 7 days)     20  0930   INR 2.90*       ASSESSMENT / PLAN  INR assessment THER    Recheck INR In: 2 WEEKS    INR Location Clinic      Anticoagulation Summary  As of 2020    INR goal:   2.5-3.5   TTR:   57.3 % (11.2 mo)   INR used for dosin.90 (2020)   Warfarin maintenance plan:   5 mg (10 mg x 0.5) every Mon, Wed, Fri; 7.5 mg (5 mg x 1.5) all other days   Full warfarin instructions:   5 mg every Mon, Wed, Fri; 7.5 mg all other days   Weekly warfarin total:   45 mg   No change documented:   Ruthann Sanders RN   Plan last modified:   Caryn Campos RN (2020)   Next INR check:   2020   Priority:   High   Target end date:   Indefinite    Indications    AF (atrial fibrillation) (H) [I48.91]  S/P mitral valve replacement [Z95.2]  Long term current use of anticoagulant therapy [Z79.01]  Persistent atrial fibrillation (H)  [I48.19]  S/P aortic valve replacement [Z95.2]             Anticoagulation Episode Summary     INR check location:       Preferred lab:   [facility]    Send INR reminders to:   SHANNA DOWELL    Comments:   Rifampin stopped early June 2020 - will need less warfarin with time.          Anticoagulation Care Providers     Provider Role Specialty Phone number    Jodi Flower Mai, MD Referring Internal Medicine [phone]            See the Encounter Report to view Anticoagulation Flowsheet and Dosing Calendar (Go to Encounters tab in chart review, and find the Anticoagulation Therapy Visit)    Dosage adjustment made based on physician directed care plan.    Ruthann Sanders RN

## 2020-09-25 ENCOUNTER — ANTICOAGULATION THERAPY VISIT (OUTPATIENT)
Dept: NURSING | Facility: CLINIC | Age: 80
End: 2020-09-25

## 2020-09-25 DIAGNOSIS — Z79.01 LONG TERM CURRENT USE OF ANTICOAGULANT THERAPY: ICD-10-CM

## 2020-09-25 DIAGNOSIS — I48.19 PERSISTENT ATRIAL FIBRILLATION (H): ICD-10-CM

## 2020-09-25 DIAGNOSIS — I48.91 AF (ATRIAL FIBRILLATION) (H): ICD-10-CM

## 2020-09-25 DIAGNOSIS — Z95.2 S/P AORTIC VALVE REPLACEMENT: ICD-10-CM

## 2020-09-25 DIAGNOSIS — Z95.2 S/P MITRAL VALVE REPLACEMENT: ICD-10-CM

## 2020-09-25 DIAGNOSIS — I48.0 PAROXYSMAL ATRIAL FIBRILLATION (H): ICD-10-CM

## 2020-09-25 LAB
CAPILLARY BLOOD COLLECTION: NORMAL
INR PPP: 4.6 (ref 0.86–1.14)

## 2020-09-25 PROCEDURE — 36416 COLLJ CAPILLARY BLOOD SPEC: CPT | Performed by: INTERNAL MEDICINE

## 2020-09-25 PROCEDURE — 99207 ZZC NO CHARGE NURSE ONLY: CPT

## 2020-09-25 PROCEDURE — 85610 PROTHROMBIN TIME: CPT | Performed by: INTERNAL MEDICINE

## 2020-09-25 NOTE — PROGRESS NOTES
ANTICOAGULATION FOLLOW-UP     Patient Name:  Fausto Farr  Date:  2020  Contact Type:  Telephone    SUBJECTIVE:  Patient Findings     Positives:   Change in health, Change in medications    Comments:   Patient reports that he pulled a muscle on the left side of his neck and shoulder.  He was trying to pull start a piece of machinery.    Has been taking 3 ibuprofen in the morning and 2 tylenol in the evening.    Reviewed previous warfarin dosing with patient.  He took warfarin as instructed.  Patient denies: extra doses, illness,    bleeding/bruises, diet changes.    INR is supratherapeutic today.   Patient will hold dose today, take 5 mg tomorrow, then resume maintenance dose.   Will follow up in 2 weeks.        Clinical Outcomes     Comments:   Patient reports that he pulled a muscle on the left side of his neck and shoulder.  He was trying to pull start a piece of machinery.    Has been taking 3 ibuprofen in the morning and 2 tylenol in the evening.    Reviewed previous warfarin dosing with patient.  He took warfarin as instructed.  Patient denies: extra doses, illness,    bleeding/bruises, diet changes.    INR is supratherapeutic today.   Patient will hold dose today, take 5 mg tomorrow, then resume maintenance dose.   Will follow up in 2 weeks.           OBJECTIVE    Recent labs: (last 7 days)     20  0859   INR 4.60*       ASSESSMENT / PLAN  INR assessment SUPRA    Recheck INR In: 2 WEEKS    INR Location Clinic lab     Anticoagulation Summary  As of 2020    INR goal:   2.5-3.5   TTR:   54.6 % (11.2 mo)   INR used for dosin.60! (2020)   Warfarin maintenance plan:   5 mg (10 mg x 0.5) every Mon, Wed, Fri; 7.5 mg (5 mg x 1.5) all other days   Full warfarin instructions:   : Hold; : 5 mg; Otherwise 5 mg every Mon, Wed, Fri; 7.5 mg all other days   Weekly warfarin total:   45 mg   Plan last modified:   Caryn Campos RN (2020)   Next INR check:   10/9/2020   Priority:    High   Target end date:   Indefinite    Indications    AF (atrial fibrillation) (H) [I48.91]  S/P mitral valve replacement [Z95.2]  Long term current use of anticoagulant therapy [Z79.01]  Persistent atrial fibrillation (H) [I48.19]  S/P aortic valve replacement [Z95.2]             Anticoagulation Episode Summary     INR check location:       Preferred lab:   [facility]    Send INR reminders to:   SHANNA DOWELL    Comments:   Rifampin stopped early June 2020 - will need less warfarin with time.          Anticoagulation Care Providers     Provider Role Specialty Phone number    Jodi Flower Mai, MD Referring Internal Medicine [phone]            See the Encounter Report to view Anticoagulation Flowsheet and Dosing Calendar (Go to Encounters tab in chart review, and find the Anticoagulation Therapy Visit)    INR is supratherapeutic today.   Patient will hold dose today, take 5 mg tomorrow, then resume maintenance dose.   Will follow up in 2 weeks.    Dosage adjustment made based on physician directed care plan.      Caryn Campos RN

## 2020-10-06 ENCOUNTER — HOSPITAL ENCOUNTER (OUTPATIENT)
Dept: CT IMAGING | Facility: CLINIC | Age: 80
End: 2020-10-06
Attending: RADIOLOGY
Payer: MEDICARE

## 2020-10-06 DIAGNOSIS — I71.40 ABDOMINAL AORTIC ANEURYSM (AAA) WITHOUT RUPTURE (H): ICD-10-CM

## 2020-10-06 LAB
CREAT BLD-MCNC: 1 MG/DL (ref 0.66–1.25)
GFR SERPL CREATININE-BSD FRML MDRD: 72 ML/MIN/{1.73_M2}

## 2020-10-06 PROCEDURE — 250N000011 HC RX IP 250 OP 636: Performed by: RADIOLOGY

## 2020-10-06 PROCEDURE — 74174 CTA ABD&PLVS W/CONTRAST: CPT

## 2020-10-06 PROCEDURE — 250N000009 HC RX 250: Performed by: RADIOLOGY

## 2020-10-06 PROCEDURE — 82565 ASSAY OF CREATININE: CPT

## 2020-10-06 RX ORDER — IOPAMIDOL 755 MG/ML
500 INJECTION, SOLUTION INTRAVASCULAR ONCE
Status: COMPLETED | OUTPATIENT
Start: 2020-10-06 | End: 2020-10-06

## 2020-10-06 RX ADMIN — IOPAMIDOL 80 ML: 755 INJECTION, SOLUTION INTRAVENOUS at 10:16

## 2020-10-06 RX ADMIN — SODIUM CHLORIDE 60 ML: 9 INJECTION, SOLUTION INTRAVENOUS at 10:16

## 2020-10-09 ENCOUNTER — ANTICOAGULATION THERAPY VISIT (OUTPATIENT)
Dept: NURSING | Facility: CLINIC | Age: 80
End: 2020-10-09

## 2020-10-09 DIAGNOSIS — I48.19 PERSISTENT ATRIAL FIBRILLATION (H): ICD-10-CM

## 2020-10-09 DIAGNOSIS — Z95.2 S/P MITRAL VALVE REPLACEMENT: ICD-10-CM

## 2020-10-09 DIAGNOSIS — Z79.01 LONG TERM CURRENT USE OF ANTICOAGULANT THERAPY: ICD-10-CM

## 2020-10-09 DIAGNOSIS — Z95.2 S/P AORTIC VALVE REPLACEMENT: ICD-10-CM

## 2020-10-09 DIAGNOSIS — I48.0 PAROXYSMAL ATRIAL FIBRILLATION (H): ICD-10-CM

## 2020-10-09 DIAGNOSIS — I48.91 AF (ATRIAL FIBRILLATION) (H): ICD-10-CM

## 2020-10-09 LAB
CAPILLARY BLOOD COLLECTION: NORMAL
INR PPP: 3.1 (ref 0.86–1.14)

## 2020-10-09 PROCEDURE — 36416 COLLJ CAPILLARY BLOOD SPEC: CPT | Performed by: INTERNAL MEDICINE

## 2020-10-09 PROCEDURE — 99207 PR NO CHARGE NURSE ONLY: CPT

## 2020-10-09 PROCEDURE — 85610 PROTHROMBIN TIME: CPT | Performed by: INTERNAL MEDICINE

## 2020-10-09 NOTE — PROGRESS NOTES
ANTICOAGULATION FOLLOW-UP     Patient Name:  Fausto Farr  Date:  10/9/2020  Contact Type:  Telephone    SUBJECTIVE:  Patient Findings     Positives:  Change in health, Missed doses    Comments:  Neck feels better. He went to a chiropractor and it helped with the pain.    Reviewed previous warfarin dosing with patient.  He took 5 mg on Tuesday instead of 7.5 mg.    The patient was assessed for   diet, medication,   bruising or bleeding,   with no problem findings.    He is leaving tomorrow to go to [place], [place] for a week.    INR is therapeutic today.   Patient will continue same maintenance dose.   Follow up in 2 weeks.        Clinical Outcomes     Comments:  Neck feels better. He went to a chiropractor and it helped with the pain.    Reviewed previous warfarin dosing with patient.  He took 5 mg on Tuesday instead of 7.5 mg.    The patient was assessed for   diet, medication,   bruising or bleeding,   with no problem findings.    He is leaving tomorrow to go to [place], [place] for a week.    INR is therapeutic today.   Patient will continue same maintenance dose.   Follow up in 2 weeks.           OBJECTIVE    Recent labs: (last 7 days)     10/09/20  1009   INR 3.10*       ASSESSMENT / PLAN  INR assessment THER    Recheck INR In: 2 WEEKS    INR Location Clinic lab     Anticoagulation Summary  As of 10/9/2020    INR goal:  2.5-3.5   TTR:  51.5 % (11.2 mo)   INR used for dosing:  3.10 (10/9/2020)   Warfarin maintenance plan:  5 mg (10 mg x 0.5) every Mon, Wed, Fri; 7.5 mg (5 mg x 1.5) all other days   Full warfarin instructions:  5 mg every Mon, Wed, Fri; 7.5 mg all other days   Weekly warfarin total:  45 mg   No change documented:  Caryn Campos, RN   Plan last modified:  Caryn Camops RN (8/31/2020)   Next INR check:  10/27/2020   Priority:  Maintenance   Target end date:  Indefinite    Indications    AF (atrial fibrillation) (H) [I48.91]  S/P mitral valve replacement [Z95.2]  Long term current  use of anticoagulant therapy [Z79.01]  Persistent atrial fibrillation (H) [I48.19]  S/P aortic valve replacement [Z95.2]             Anticoagulation Episode Summary     INR check location:      Preferred lab:  [facility]    Send INR reminders to:  SHANNA DOWELL    Comments:  Rifampin stopped early June 2020 - will need less warfarin with time.          Anticoagulation Care Providers     Provider Role Specialty Phone number    Jodi Flower Mai, MD Referring Internal Medicine [phone]            See the Encounter Report to view Anticoagulation Flowsheet and Dosing Calendar (Go to Encounters tab in chart review, and find the Anticoagulation Therapy Visit)    INR is therapeutic today.   Patient will continue same maintenance dose.   Follow up in 2 weeks.        Caryn Campos RN

## 2020-10-15 ENCOUNTER — TELEPHONE (OUTPATIENT)
Dept: OTHER | Facility: CLINIC | Age: 80
End: 2020-10-15

## 2020-10-15 NOTE — TELEPHONE ENCOUNTER
Called and left message.  Need appointment with Dr. Osborne regarding CTA.  SMA stenosis.  Would like to schedule patient for clinic appt with Dr. Osborne on Monday 10/19    Yesy Rao RN  IR nurse clinician  [phone]

## 2020-10-19 ENCOUNTER — VIRTUAL VISIT (OUTPATIENT)
Dept: OTHER | Facility: CLINIC | Age: 80
End: 2020-10-19
Attending: RADIOLOGY
Payer: MEDICARE

## 2020-10-19 VITALS — BODY MASS INDEX: 35.73 KG/M2 | WEIGHT: 218 LBS

## 2020-10-19 DIAGNOSIS — I71.40 ABDOMINAL AORTIC ANEURYSM (AAA) WITHOUT RUPTURE (H): Primary | ICD-10-CM

## 2020-10-19 NOTE — PROGRESS NOTES
"Fausto Farr is a 80 year old male who is being evaluated via a billable telephone visit.      The patient has been notified of following:     \"This telephone visit will be conducted via a call between you and your physician/provider. We have found that certain health care needs can be provided without the need for a physical exam.  This service lets us provide the care you need with a short phone conversation.  If a prescription is necessary we can send it directly to your pharmacy.  If lab work is needed we can place an order for that and you can then stop by our lab to have the test done at a later time.    Telephone visits are billed at different rates depending on your insurance coverage. During this emergency period, for some insurers they may be billed the same as an in-person visit.  Please reach out to your insurance provider with any questions.    If during the course of the call the physician/provider feels a telephone visit is not appropriate, you will not be charged for this service.\"    Patient has given verbal consent for Telephone visit?  Yes    What phone number would you like to be contacted at? [phone]    How would you like to obtain your AVS? Mail a copy    Phone call duration: 15 minutes    Lavonne Barth MA      "

## 2020-10-20 NOTE — PROGRESS NOTES
"Fausto Farr. \"Ralph\"  Male, 80 year old, 1940  MRN:   [id number]      Fausto Farr is a 80 year old male who is being evaluated via a billable telephone visit.       This is a pleasant 80 year old male who is being seen in virtual clinic today for follow up evaluation of abdominal aortic aneurysm.  The patient underwent endovascular repair in 4/2008.  We have been doing yearly surveillance with CT imaging.  On 9/11/2020 CT imaging, there was a slight interval increase in size of the aneurysm sac measuring 7.0 cm in diameter previous 6.6 cm.  Repeat CT with contrast was recommended.  On 10/6/2020, CTA abdomen and pelvis with contrast was performed.   This shows a mild interval increase in size of the infrarenal abdominal aortic aneurysm sac.  There is evidence for Type II endoleaks in  The excluded aneurysm sac.  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      Assessment/Plan:    I discussed the imaging with the patient. We will continue to follow the his aneurysm with a CTA in 1 year.  We discussed that we may need to treat the endoleaks if there continues to be sac expansion.    The patients states he currently is not having any abdominal or post prandial pain.  He denies weight loss.   I recommended that he continue to monitor his symptoms.  If he would develop abdominal pain or weight loss we would need to proceed with further evaluation with an angiogram and most likely stenting of the superior mesenteric artery.   The patient agreed to the plan.  Will obtain CTA abdomen/pelvis in one year.    I met with the patient for 20 minutes of which >50% of the time was used to discuss treatment options and/or coordination of care.      Dr. Johnnie Osborne   Interventional Radiology  Pager# [phone]  [facility]      CTA ABDOMEN AND PELVIS WI  TH CONTRAST  10/6/2020 10:40 AM      HISTORY: Patient is status post endovascular repair of an " infrarenal  abdominal aortic aneurysm.     COMPARISON: CT angiogram dated 6/22/2017     TECHNIQUE: CT angiogram of the abdomen and pelvis was performed  following the administration of 80 mL intravenous contrast during an  early arterial enhanced phase of imaging and during a delayed venous  enhanced phase of imaging. Images are reviewed in multiple planes and  3-D reconstructions were also performed. Radiation dose for this scan  was reduced using automated exposure control, adjustment of the mA  and/or kV according to patient size, or iterative reconstruction  technique.     FINDINGS: Again identified is an infrarenal abdominal aortic  endograft. The main body and limbs of the endograft are patent without  significant stenoses. The iliac arteries and proximal femoral arteries  distal to the limbs are patent without significant stenoses.     On the delayed images, there appears to be an endoleak primarily  located along the right posterior and anterolateral aspects of the  lower aneurysm sac. Maximum anterior-posterior and transverse  dimensions of the aneurysm sac are approximately 6.9 x 6.6 cm versus  (as measured on the sagittal and coronal planes) 6.3 x 6.3 cm on the  previous exam in 2017.     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 The renal arteries appear to  be patent without significant stenoses.     Soft tissues: Small enhancing focus in the medial superior spleen is  unchanged. Remaining solid organs appear grossly unremarkable. There  are scattered colonic diverticuli. Bowel otherwise appears grossly  unremarkable. There are COPD changes at the lung bases. Postsurgical  changes consistent with posterior fusion at L4-L5. Moderate  compression deformity of the L4 vertebral body.                                                                      IMPRESSION:  1. Mild interval increase in size of an infrarenal abdominal aortic  aneurysm  sac post endovascular repair. There is evidence for type II  endoleaks in the excluded aneurysm sac as described above.  2. 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 Correlate clinically for chronic mesenteric  ischemia. If indicated, these vessels would be amenable to stenting.     ANDERSON WOODS MD         I

## 2020-10-23 ENCOUNTER — TELEPHONE (OUTPATIENT)
Dept: CARDIOLOGY | Facility: CLINIC | Age: 80
End: 2020-10-23

## 2020-10-23 DIAGNOSIS — I48.0 PAROXYSMAL ATRIAL FIBRILLATION (H): Primary | ICD-10-CM

## 2020-10-23 RX ORDER — METOPROLOL TARTRATE 25 MG/1
TABLET, FILM COATED ORAL
Qty: 90 TABLET | Refills: 3 | Status: SHIPPED | OUTPATIENT
Start: 2020-10-23 | End: 2021-04-19

## 2020-10-23 NOTE — TELEPHONE ENCOUNTER
10/23/20 Pt calling for refill request for Metoprolol 12.5 mg BID. Per last OV note on 6/26/20  1. Restart metoprolol tartrate but at a low dose of 12.5 mg twice daily  Also, per Epic pt is supposed to follow up in 10/20 w 24 hour Holter and appt w Anabell Xiao AURELIA to go over results. Pt transferred to scheduling to facilitate. Pt voiced understanding and agreement w laci Espinal 1210 pm

## 2020-10-27 ENCOUNTER — HOSPITAL ENCOUNTER (OUTPATIENT)
Dept: CARDIOLOGY | Facility: CLINIC | Age: 80
Discharge: HOME | End: 2020-10-27
Attending: INTERNAL MEDICINE | Admitting: INTERNAL MEDICINE
Payer: MEDICARE

## 2020-10-27 ENCOUNTER — ANTICOAGULATION THERAPY VISIT (OUTPATIENT)
Dept: NURSING | Facility: CLINIC | Age: 80
End: 2020-10-27

## 2020-10-27 DIAGNOSIS — Z95.2 S/P MITRAL VALVE REPLACEMENT: ICD-10-CM

## 2020-10-27 DIAGNOSIS — Z95.2 S/P AORTIC VALVE REPLACEMENT: ICD-10-CM

## 2020-10-27 DIAGNOSIS — Z79.01 LONG TERM CURRENT USE OF ANTICOAGULANT THERAPY: ICD-10-CM

## 2020-10-27 DIAGNOSIS — I48.91 AF (ATRIAL FIBRILLATION) (H): ICD-10-CM

## 2020-10-27 DIAGNOSIS — I38 VALVULAR HEART DISEASE: ICD-10-CM

## 2020-10-27 DIAGNOSIS — I48.0 PAROXYSMAL ATRIAL FIBRILLATION (H): ICD-10-CM

## 2020-10-27 DIAGNOSIS — I48.19 PERSISTENT ATRIAL FIBRILLATION (H): ICD-10-CM

## 2020-10-27 LAB
CAPILLARY BLOOD COLLECTION: NORMAL
INR PPP: 3.9 (ref 0.86–1.14)

## 2020-10-27 PROCEDURE — 93225 XTRNL ECG REC<48 HRS REC: CPT

## 2020-10-27 PROCEDURE — 85610 PROTHROMBIN TIME: CPT | Performed by: INTERNAL MEDICINE

## 2020-10-27 PROCEDURE — 99207 PR NO CHARGE NURSE ONLY: CPT

## 2020-10-27 PROCEDURE — 36416 COLLJ CAPILLARY BLOOD SPEC: CPT | Performed by: INTERNAL MEDICINE

## 2020-10-27 PROCEDURE — 93227 XTRNL ECG REC<48 HR R&I: CPT | Performed by: INTERNAL MEDICINE

## 2020-10-27 NOTE — PROGRESS NOTES
ANTICOAGULATION FOLLOW-UP     Patient Name:  Fausto Farr  Date:  10/27/2020  Contact Type:  Telephone    SUBJECTIVE:  Patient Findings     Comments:  Reviewed previous warfarin dosing with patient.  He took warfarin as instructed.  Patient denies: extra doses, illness, inflammation,   bleeding/bruises, medication changes, diet changes,  or increased alcohol intake.    INR is supratherapeutic today.   Patient will take 5 mg today, then decrease weekly maintenance dose total by 5.6%.  Will follow up in 3 weeks. Protocol is 2 weeks, but appointment made for 3 weeks due to patient availability.    AVS calendar mailed to home address.          Clinical Outcomes     Comments:  Reviewed previous warfarin dosing with patient.  He took warfarin as instructed.  Patient denies: extra doses, illness, inflammation,   bleeding/bruises, medication changes, diet changes,  or increased alcohol intake.    INR is supratherapeutic today.   Patient will take 5 mg today, then decrease weekly maintenance dose total by 5.6%.  Will follow up in 3 weeks. Protocol is 2 weeks, but appointment made for 3 weeks due to patient availability.    AVS calendar mailed to home address.             OBJECTIVE    Recent labs: (last 7 days)     10/27/20  0803   INR 3.90*       ASSESSMENT / PLAN  INR assessment SUPRA    Recheck INR In: 2 WEEKS    INR Location Clinic lab     Anticoagulation Summary  As of 10/27/2020    INR goal:  2.5-3.5   TTR:  50.5 % (11.2 mo)   INR used for dosing:  3.90 (10/27/2020)   Warfarin maintenance plan:  7.5 mg (5 mg x 1.5) every Tue, Thu, Sat; 5 mg (10 mg x 0.5) all other days   Full warfarin instructions:  10/27: 5 mg; Otherwise 7.5 mg every Tue, Thu, Sat; 5 mg all other days   Weekly warfarin total:  42.5 mg   Plan last modified:  Caryn Campos RN (10/27/2020)   Next INR check:  11/17/2020   Priority:  Maintenance   Target end date:  Indefinite    Indications    AF (atrial fibrillation) (H) [I48.91]  S/P mitral  valve replacement [Z95.2]  Long term current use of anticoagulant therapy [Z79.01]  Persistent atrial fibrillation (H) [I48.19]  S/P aortic valve replacement [Z95.2]             Anticoagulation Episode Summary     INR check location:      Preferred lab:  [facility]    Send INR reminders to:  SHANNA DOWELL    Comments:  Rifampin stopped early June 2020 - will need less warfarin with time.          Anticoagulation Care Providers     Provider Role Specialty Phone number    Jodi Flower Mai, MD Referring Internal Medicine [phone]            See the Encounter Report to view Anticoagulation Flowsheet and Dosing Calendar (Go to Encounters tab in chart review, and find the Anticoagulation Therapy Visit)    INR is supratherapeutic today.   Patient will take 5 mg today, then decrease weekly maintenance dose total by 5.6%.  Will follow up in 3 weeks. Protocol is 2 weeks, but appointment made for 3 weeks due to patient availability.    Dosage adjustment made based on physician directed care plan.        Caryn Campos RN

## 2020-11-01 NOTE — PROGRESS NOTES
HPI:  Fausto Farr is a 80 year old male who presents for 6 month cardiology follow up.  He is a patient of  Dr. Santo.  His medical history includes     1. Valvular heart disease.   AVR in 2006 with subsequent perivalvular leak and redo mechanical AVR and concomitant mechanical MVR in 2013  2. Coronary artery disease.  CABG (LIMA to LAD and radial graft to RCA) in 2006.  3. Chronic diastolic heart failure.  LVEF is 55%-60%.   4.  Previous endovascular AAA repair.   5.  AV conduction disease with bifascicular block and prolonged GA.    6.  Obstructive sleep apnea with use of CPAP.   7.  Hypertension.    9.  Chronic back pain.   10.  internal carotid artery disease.  Severe stenosis of left external carotid artery.   11.   History of typical atrial flutter, successful catheter ablation in 04/2019.   12.  Varicose veins with venous insufficiency.  Treatment with VenaSeal closure, sclerotherapy and phlebectomy by Dr. Whitman in 01/2019.  13.  Asymptomatic NSVT.      In November 2019, patient was hospitalized for nonhealing left lower leg wound for debridement and skin grafting.  Postoperatively he was found to be bradycardic with second-degree AV block and often 2-1 AV conduction.  At that time patient reported intermittent episodes of lightheadedness lasting no longer than 10 seconds.  Pedro Jeffries did not recommend proceeding with a pacemaker until his leg is fully healed     On 12/11, he was re-admitted to the hospital for dehiscence of his right posterior calf wound and he wore a 30 day monitor which revealed episodes of atrial fibrillation with RVR and episodes of and NSVT, no bradycardia, no pauses.         In 6/2020, pt met with Dr. Vasquez regarding arrhythmias as he had underlying conduction disease of which his metoprolol tartrate 25 mg twice daily had been stopped.  A leadless cardiac monitor which revealed frequent PVCs, burden 11%, with over 500 runs of nonsustained VT.  He also had frequent PACs (5-6%) with  3,000 runs of SVT.  No periods of AV block.  Ralph was asymptomtatic but due to the high burden an ECHO was ordered, metoprolol tartrate 12.5 mg twice daily and repeat monitor.      The ECHO (7/2020) revealed mechanical mitral valve and mechanical aortic valve with normal gradients, EF 50-55% but no changes compared to previous ECHO    24 Hour Holter monitor revealed sinus rhythm with average HR 73 bpm, 8% PVC burden, 1-2% PAC burden which improved with metoprolol and per Dr. Vasquez no need for antiarrhythmic medications or ppm.        On 10/19/2020, he met with vascular medicine to discuss CT findings of mild interval increase in size of the infrarenal abdominal aortic aneurysm sac.  There is evidence for Type II endoleaks in  The excluded aneurysm sac.  There is also severe stenoses at the origins of the celiac trunk and superior mesenteric arteries and discussed he has abdominal discomfort to proceed to angiogram with possible stenting.     Today he reports dizziness when he lifts his head fast and shortness of breath with exertion such as carrying items but denies associated symptoms such as chest pain or pressure. In the past year pt had lost 30# with his infection but has regained 20#.   Due to multiple antibiotics patient's INR levels were labile but did not have bleeding issues.   Overall he denies syncope, dyspnea at rest, palpitations, orthopnea, or PND.        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     This occurred at night while on metoprolol tartrate 25 mg twice daily     30 day monitor (11/2019) revealed episodes of atrial fibrillation with rates between  bpm, intermittent NSVT, there were no episodes of bradycardia or pauses.  Repeat monitor revealed increased PVC burden and pt was restarted on metoprolol 12.5 mg twice daily    Monitor (9/2020) while on metoprolol tartrate 12.5 mg twice daily revealed no pauses, 8% PVC burden.  No pacemaker needed at this  time.        Paroxsymal Atrial fibrillation    On 11/2019, pt's metoprolol tartrate was discontinued due to second degree AV and Dr. Vasquez restarted metoprolol tartrate 12.5 mg twice daily due to high PAC/PVC burden per zio patch results (6/2020).   Recent Holter did not reveal atrial fibrillation, bradycardia or pauses.      For anticoagulation for CHADS VASC 4 (CAD, Age++, HTN) he is taking warfarin with goal INR 2.5-3.5 due mechanical MVR.    At times his INR is high and his antibiotics had been interfering.      Coronary artery disease     CABG (LIMA to LAD and radial graft to RCA) in 2006.      Asymptomatic     Currently taking metoprolol tartrate 12.5 mg twice daily    No aspirin due to warfarin use    Currently taking Crestor 40 mg daily and Zetia 10 mg daily      Valvular heart disease.       AVR in 2006 with subsequent perivalvular leak and redo mechanical AVR and concomitant mechanical MVR in 2013    ECHO 6/2020 showed normal gradients    History of typical atrial flutter    successful catheter ablation in 04/2019.    carotid artery disease.    Noted to have less than 50% stenosis on right and left ICA and severe stenosis on left external carotid artery (Per ultrasound 2018)    Currently taking warfarin and Crestor.     Having intermittent dizziness with turning head.     Hypertension    Controlled    Plan:  US of carotid arteries  Follow up with Dr. Santo in early 2021      Patient expresses understanding and agreement with the plan.       I appreciate the chance to help with Fausto Farr Please let me know if you have any questions or concerns.    Anabell Xiao, APRN, CNP    This note was completed in part using Dragon voice recognition software. Although reviewed after completion, some word and grammatical errors may occur.    Orders Placed This Encounter   Procedures     Follow-Up with Cardiologist     No orders of the defined types were placed in this encounter.    Medications Discontinued  During This Encounter   Medication Reason     methocarbamol (ROBAXIN) 750 MG tablet Stopped by Patient         Encounter Diagnosis   Name Primary?     Valvular heart disease Yes       CURRENT MEDICATIONS:  Current Outpatient Medications   Medication Sig Dispense Refill     acetaminophen (TYLENOL) 500 MG tablet Take 500-1,000 mg by mouth every 6 hours as needed for pain       amoxicillin-clavulanate (AUGMENTIN) 875-125 MG tablet Take 1 tablet by mouth daily       betamethasone valerate (VALISONE) 0.1 % external lotion Apply topically 2 times daily Apply topically to scalp twice daily PRN 60 mL 3     cholecalciferol 25 MCG (1000 UT) TABS Take 1,000 Units by mouth daily       ezetimibe (ZETIA) 10 MG tablet Take 1 tablet (10 mg) by mouth daily 90 tablet 3     ferrous sulfate (FEROSUL) 325 (65 Fe) MG tablet Take 325 mg by mouth daily (with breakfast)       fluocinonide (LIDEX) 0.05 % external ointment Apply topically 2 times daily as needed       furosemide (LASIX) 40 MG tablet Take 0.5 tablets (20 mg) by mouth daily 45 tablet 3     glucosamine-chondroitin 500-400 MG CAPS per capsule Take 1 capsule by mouth 2 times daily       mesalamine (ASACOL HD) 800 MG EC tablet Take 1 tablet (800 mg) by mouth 2 times daily 180 tablet 3     metoprolol tartrate (LOPRESSOR) 25 MG tablet Take 12.5 mg ( 1/2 tablet) twice daily 90 tablet 3     rosuvastatin (CRESTOR) 40 MG tablet Take 1 tablet (40 mg) by mouth daily 90 tablet 3     tamsulosin (FLOMAX) 0.4 MG capsule TAKE 1 CAPSULE BY MOUTH EVERY DAY. 90 capsule 3     warfarin ANTICOAGULANT (COUMADIN) 10 MG tablet Take 20 mg every Wednesday and 25 mg all other days or as directed by the INR clinic 220 tablet 0     warfarin ANTICOAGULANT (COUMADIN) 5 MG tablet Take 12.5mg every Mon,Wed & Fri / Take 10mg all other days OR as instructed by INR clinic 186 tablet 0       ALLERGIES     Allergies   Allergen Reactions     Bees Anaphylaxis       PAST MEDICAL HISTORY:  Past Medical History:    Diagnosis Date     Abdominal pain      Abnormal ECG     RBBB, 1st degree AVB, Left axis deviation     Anemia     currently taking iron     Arrhythmia     pac, pvc     Back pain     since 1980     BPH (benign prostatic hyperplasia)      Bruit      CAD (coronary artery disease)      Cellulitis 10/18/12     Cellulitis 05/2018    GrpB strep LLE cellulitis  negative RACHAEL for veg     Chronic venous insufficiency     bilat lower extremities     Contact dermatitis and other eczema, due to unspecified cause      Diaphragmatic hernia without mention of obstruction or gangrene      Diastolic HF (heart failure) (H)      Gastric ulcer      Glucose intolerance (impaired glucose tolerance)      Heart murmur 9/16/13    valvular heart disease     Hyperlipidemia LDL goal <100 8/6/2013     Hypertension 8/6/13     Lumbago      Malaise and fatigue      Metabolic syndrome      Mobitz (type) I (Wenckebach's) atrioventricular block     and RBBB     Nocturia 10/18/12     Nonallopathic lesion of cervical region      Nonallopathic lesion of lumbar region      Nonallopathic lesion of pelvic region, not elsewhere classified      Nonallopathic lesion of rib cage      Nonallopathic lesion of sacral region      GAGE (obstructive sleep apnea)     CPAP     Paroxysmal atrial fibrillation (H) 10/18/12     Prostate cancer (H) 2008    radiation seed, XRT      PVD (peripheral vascular disease) (H)      RBBB     1st degree AVB, RBBB, LAHB     Rotator cuff strain     and sprain     S/P AAA repair      S/P aortic valve replacement 2006    developed perivalve leak and MS, therefore redo surg 2013     S/P CABG (coronary artery bypass graft) 2006    Lima-Lad, RA-Rca     Sciatica of left side     since 2000     Sepsis due to group B Streptococcus (H) 5/19/2018     Ulcerative colitis (H)      Varicose veins of bilateral lower extremities with other complications     s/p RLE vein stripping     Vitamin D deficiency        PAST SURGICAL HISTORY:  Past Surgical  History:   Procedure Laterality Date     AORTIC VALVE REPLACEMENT  1/3/06    redo AVR SJM 21mm and SJM MVR 27mm in 2013SJM 21([id number]):AVR, SJM 27 :MVR-     APPLY WOUND VAC N/A 11/12/2019    Procedure: APPLICATION, WOUND VAC;  Surgeon: Sara Martinez MD;  Location:  OR     ARTHROPLASTY KNEE      right knee     ARTHROPLASTY KNEE Right 7/22/2019    Procedure: Right total knee arthroplasty;  Surgeon: Prasanth Jensen MD;  Location: [facility] OR     BACK SURGERY  Oct 2015    Fusion L4-5, laminectomy L2, L3     BYPASS GRAFT ARTERY CORONARY  10/2013    reimplantation of radial artery graft to RCA     C CABG, VEIN, TWO  1/3/06    Left radial to RCA, LIMA to LAD (RA to RCA reimplanted at time of 2013 surg)     CARDIAC CATHERIZATION  11/2005    Stent placed to RCA     CARDIAC CATHERIZATION  04/2013    Occluded RCA, patent LIMA to LAD and radial graft to PDA     CARPAL TUNNEL RELEASE RT/LT  1994     COLONOSCOPY  8-22-11     CYSTOSCOPY FLEXIBLE  10/16/2013    Procedure: CYSTOSCOPY FLEXIBLE;  FLEXIBLE CYSTOSCOPY / DILATION OF URETHRA / INSERTION OF LESLIE;  Surgeon: Cooper Wallace MD;  Location:  OR     ENDOVASCULAR REPAIR, INFRARENAL ABDOMINAL AORTIC ANEURYSM/DISSECTION; MODULAR BIFURCATED PROSTHESIS  2006    AAA repair endovascular     ENT SURGERY       EP ABLATION ATRIAL FLUTTER N/A 4/22/2019    Procedure: EP Ablation Atrial Flutter;  Surgeon: Jessy Vasquez MD;  Location:  [facility]     GENITOURINARY SURGERY  6/16/08    radioactive seeding     GRAFT FLAP PEDICLE EXTREMITY (LOCATION) Right 11/12/2019    Procedure: SURGICAL PROCUREMENT, FLAP, PEDICLE, EXTREMITY;  Surgeon: Sara Martinez MD;  Location:  OR     GRAFT SKIN SPLIT THICKNESS FROM EXTREMITY Right 11/12/2019    Procedure: RIGHT GASTRONEMIUS FLAP TO RIGHT KNEE, SPLIT THICKNESS SKIN GRAFT FROM RIGHT THIGH TO RIGHT KNEE, SURGICAL PROCUREMENT, FLAP, PEDICLE, EXTREMITY, WOUND VAC PLACEMENT;  Surgeon: Juan  Sara Majano MD;  Location:  OR     HEAD & NECK SURGERY  1997    vocal cord polypectomy     INCISION AND DRAINAGE KNEE, COMBINED Right 8/29/2019    Procedure: INCISION AND DRAINAGE, KNEE W/ Secondary Wound Closure;  Surgeon: Prasanth Jensen MD;  Location:  OR     IRRIGATION AND DEBRIDEMENT KNEE, PLACE ANTIBIOTIC CEMENT BEADS / SPACE Right 2/12/2020    Procedure: 1. Irrigation and debridement of wound breakdown, right total knee arthroplasty.  2. Irrigation and debridement of right total knee with placement of antibiotic-impregnated (vancomycin) absorbable antibiotic beads.;  Surgeon: Prasanth Jensen MD;  Location: [facility] OR     IRRIGATION AND DEBRIDEMENT LOWER EXTREMITY, COMBINED Right 12/8/2019    Procedure: DEBRIDEMENT OF RIGHT CALF AND WOUND CLOSURE.;  Surgeon: Sara Martinez MD;  Location:  OR     KNEE SURGERY  2001 Right knee arthroscopy     OPTICAL TRACKING SYSTEM FUSION SPINE POSTERIOR LUMBAR THREE+ LEVELS N/A 10/29/2015    Procedure: OPTICAL TRACKING SYSTEM FUSION SPINE POSTERIOR LUMBAR THREE+ LEVELS;  Surgeon: Walt Garcia MD;  Location:  OR     PROSTATE SURGERY      radioactive seeding 6/16/08     REPAIR ANEURYSM ABDOMINAL AORTA  06/08     REPAIR VALVE MITRAL  10/16/2013    SJM 21([id number] [id number]):AVR, SJM 27 :MVRProcedure: REPAIR VALVE MITRAL;  REDO STERNOTOMY/REDO AORTIC VALVE REPLACEMENT/ MITRAL VALVE REPLACEMENT/REIMPLANTATION OF RIGHT CORONARY ARTERY BYPASS WITH RACHAEL ( ON PUMP);  Surgeon: Viet Singh MD;  Location:  OR     REPLACE VALVE AORTIC  10/16/2013    Procedure: REPLACE VALVE AORTIC;;  Surgeon: Viet Singh MD;  Location:  OR     SURGERY GENERAL IP CONSULT  05/2008 Excision aneurysm abdominal aorta     SURGERY GENERAL IP CONSULT  1997 Vocal cord polypectomy     VASCULAR SURGERY  1968, 1993     varicose vein stripping       FAMILY HISTORY:  Family History   Problem Relation Age of Onset     No Known Problems Mother      Coronary Artery Disease  Father         CABG     Heart Disease Father         Pacemaker     No Known Problems Brother      No Known Problems Sister      No Known Problems Son      Other Cancer Daughter      No Known Problems Daughter      Heart Disease Brother      Other - See Comments Grandchild        SOCIAL HISTORY:  Social History     Socioeconomic History     Marital status:      Spouse name: None     Number of children: None     Years of education: None     Highest education level: None   Occupational History     None   Social Needs     Financial resource strain: None     Food insecurity     Worry: None     Inability: None     Transportation needs     Medical: None     Non-medical: None   Tobacco Use     Smoking status: Former Smoker     Packs/day: 1.00     Years: 40.00     Pack years: 40.00     Start date: 4/15/1962     Quit date: 10/23/2002     Years since quittin.0     Smokeless tobacco: Never Used   Substance and Sexual Activity     Alcohol use: Yes     Frequency: 2-4 times a month     Drinks per session: 1 or 2     Comment: a couple beers per week (socially)     Drug use: No     Sexual activity: Not Currently     Partners: Female   Lifestyle     Physical activity     Days per week: None     Minutes per session: None     Stress: None   Relationships     Social connections     Talks on phone: None     Gets together: None     Attends Restorationist service: None     Active member of club or organization: None     Attends meetings of clubs or organizations: None     Relationship status: None     Intimate partner violence     Fear of current or ex partner: None     Emotionally abused: None     Physically abused: None     Forced sexual activity: None   Other Topics Concern     Parent/sibling w/ CABG, MI or angioplasty before 65F 55M? Yes     Comment: Brother had bypass at 55      Service Not Asked     Blood Transfusions Not Asked     Caffeine Concern No     Comment: 6-8 cups of half and half per day     Occupational  "Exposure Not Asked     Hobby Hazards Not Asked     Sleep Concern Not Asked     Stress Concern Not Asked     Weight Concern Not Asked     Special Diet No     Back Care Not Asked     Exercise No     Bike Helmet Not Asked     Seat Belt Not Asked     Self-Exams Not Asked   Social History Narrative     None       Review of Systems:  Skin:  Negative     Eyes:  Positive for glasses;cataracts  ENT:  Positive for hearing loss  Respiratory:    shortness of breath;sleep apnea;CPAP  Cardiovascular:  Negative    Gastroenterology: Negative    Genitourinary:  Negative    Musculoskeletal:  Positive for back pain  Neurologic:  Negative    Psychiatric:  Negative    Heme/Lymph/Imm:  Negative    Endocrine:  Negative      Physical Exam:  Vitals: /59 (BP Location: Right arm, Patient Position: Sitting, Cuff Size: Adult Large)   Pulse 52   Ht 1.664 m (5' 5.5\")   Wt 96.6 kg (213 lb)   SpO2 100%   BMI 34.91 kg/m      Constitutional:  cooperative, alert and oriented, well developed, well nourished, in no acute distress obese      Skin:  warm and dry to the touch, no apparent skin lesions or masses noted        Head:  normocephalic;normocephalic, no masses or lesions        Eyes:  pupils equal and round        ENT:  no pallor or cyanosis        Neck:  carotid pulses are full and equal bilaterally, JVP normal, no carotid bruit        Chest:  clear to auscultation        Cardiac: regular rhythm, normal S1/S2, no S3 or S4, apical impulse not displaced, no murmurs, gallops or rubs                  Abdomen:  abdomen soft obese      Vascular:       right radial artery;1+             left radial artery;1+                  Extremities and Back:  no deformities, clubbing, cyanosis, erythema observed        Neurological:  no gross motor deficits          Recent Lab Results:  LIPID RESULTS:  Lab Results   Component Value Date    CHOL 152 06/01/2020    HDL 34 (L) 06/01/2020    LDL 90 06/01/2020    TRIG 138 06/01/2020    CHOLHDLRATIO 3.6 " 06/03/2015       LIVER ENZYME RESULTS:  Lab Results   Component Value Date    AST 19 06/01/2020    ALT 16 06/01/2020       CBC RESULTS:  Lab Results   Component Value Date    WBC 7.6 06/15/2020    RBC 4.44 06/15/2020    HGB 13.0 (L) 06/15/2020    HCT 40.0 06/15/2020    MCV 90 06/15/2020    MCH 29.3 06/15/2020    MCHC 32.5 06/15/2020    RDW 13.3 06/15/2020     06/15/2020       BMP RESULTS:  Lab Results   Component Value Date     06/01/2020    POTASSIUM 4.0 06/01/2020    CHLORIDE 106 06/01/2020    CO2 26 06/01/2020    ANIONGAP 4 06/01/2020     (H) 06/01/2020    BUN 13 06/01/2020    CR 0.73 06/01/2020    GFRESTIMATED 72 10/06/2020    GFRESTBLACK 87 10/06/2020    AWILDA 8.5 06/01/2020        INR RESULTS:  Lab Results   Component Value Date    INR 3.90 (H) 10/27/2020    INR 3.10 (H) 10/09/2020           CC  Jodi Flower MD  [address] [place] [address]

## 2020-11-02 ENCOUNTER — OFFICE VISIT (OUTPATIENT)
Dept: CARDIOLOGY | Facility: CLINIC | Age: 80
End: 2020-11-02
Attending: INTERNAL MEDICINE
Payer: MEDICARE

## 2020-11-02 VITALS
HEART RATE: 52 BPM | BODY MASS INDEX: 34.23 KG/M2 | DIASTOLIC BLOOD PRESSURE: 59 MMHG | SYSTOLIC BLOOD PRESSURE: 120 MMHG | OXYGEN SATURATION: 100 % | HEIGHT: 66 IN | WEIGHT: 213 LBS

## 2020-11-02 DIAGNOSIS — I73.9 PVD (PERIPHERAL VASCULAR DISEASE) (H): ICD-10-CM

## 2020-11-02 DIAGNOSIS — R42 DIZZINESS: ICD-10-CM

## 2020-11-02 DIAGNOSIS — I38 VALVULAR HEART DISEASE: Primary | ICD-10-CM

## 2020-11-02 PROCEDURE — 99214 OFFICE O/P EST MOD 30 MIN: CPT | Performed by: NURSE PRACTITIONER

## 2020-11-02 ASSESSMENT — MIFFLIN-ST. JEOR: SCORE: 1615.97

## 2020-11-02 NOTE — LETTER
11/2/2020    Chris Flower MD  [address] [place] [address]    RE: Fausto Farr       Dear Colleague,    I had the pleasure of seeing Fausto Farr in the [facility].    HPI:  Fausto Farr is a 80 year old male who presents for 6 month cardiology follow up.  He is a patient of  Dr. Santo.  His medical history includes     1. Valvular heart disease.   AVR in 2006 with subsequent perivalvular leak and redo mechanical AVR and concomitant mechanical MVR in 2013  2. Coronary artery disease.  CABG (LIMA to LAD and radial graft to RCA) in 2006.  3. Chronic diastolic heart failure.  LVEF is 55%-60%.   4.  Previous endovascular AAA repair.   5.  AV conduction disease with bifascicular block and prolonged CA.    6.  Obstructive sleep apnea with use of CPAP.   7.  Hypertension.    9.  Chronic back pain.   10.  internal carotid artery disease.  Severe stenosis of left external carotid artery.   11.   History of typical atrial flutter, successful catheter ablation in 04/2019.   12.  Varicose veins with venous insufficiency.  Treatment with VenaSeal closure, sclerotherapy and phlebectomy by Dr. Whitman in 01/2019.  13.  Asymptomatic NSVT.      In November 2019, patient was hospitalized for nonhealing left lower leg wound for debridement and skin grafting.  Postoperatively he was found to be bradycardic with second-degree AV block and often 2-1 AV conduction.  At that time patient reported intermittent episodes of lightheadedness lasting no longer than 10 seconds.  Pedro Jeffries did not recommend proceeding with a pacemaker until his leg is fully healed     On 12/11, he was re-admitted to the hospital for dehiscence of his right posterior calf wound and he wore a 30 day monitor which revealed episodes of atrial fibrillation with RVR and episodes of and NSVT, no bradycardia, no pauses.         In 6/2020, pt met with Dr. Vasquez regarding arrhythmias as he had underlying  conduction disease of which his metoprolol tartrate 25 mg twice daily had been stopped.  A leadless cardiac monitor which revealed frequent PVCs, burden 11%, with over 500 runs of nonsustained VT.  He also had frequent PACs (5-6%) with 3,000 runs of SVT.  No periods of AV block.  Ralph was asymptomtatic but due to the high burden an ECHO was ordered, metoprolol tartrate 12.5 mg twice daily and repeat monitor.      The ECHO (7/2020) revealed mechanical mitral valve and mechanical aortic valve with normal gradients, EF 50-55% but no changes compared to previous ECHO    24 Hour Holter monitor revealed sinus rhythm with average HR 73 bpm, 8% PVC burden, 1-2% PAC burden which improved with metoprolol and per Dr. Vasquez no need for antiarrhythmic medications or ppm.        On 10/19/2020, he met with vascular medicine to discuss CT findings of mild interval increase in size of the infrarenal abdominal aortic aneurysm sac.  There is evidence for Type II endoleaks in  The excluded aneurysm sac.  There is also severe stenoses at the origins of the celiac trunk and superior mesenteric arteries and discussed he has abdominal discomfort to proceed to angiogram with possible stenting.     Today he reports dizziness when he lifts his head fast and shortness of breath with exertion such as carrying items but denies associated symptoms such as chest pain or pressure. In the past year pt had lost 30# with his infection but has regained 20#.   Due to multiple antibiotics patient's INR levels were labile but did not have bleeding issues.   Overall he denies syncope, dyspnea at rest, palpitations, orthopnea, or PND.        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     This occurred at night while on metoprolol tartrate 25 mg twice daily     30 day monitor (11/2019) revealed episodes of atrial fibrillation with rates between  bpm, intermittent NSVT, there were no episodes of bradycardia or  pauses.  Repeat monitor revealed increased PVC burden and pt was restarted on metoprolol 12.5 mg twice daily    Monitor (9/2020) while on metoprolol tartrate 12.5 mg twice daily revealed no pauses, 8% PVC burden.  No pacemaker needed at this time.        Paroxsymal Atrial fibrillation    On 11/2019, pt's metoprolol tartrate was discontinued due to second degree AV and Dr. Vasquez restarted metoprolol tartrate 12.5 mg twice daily due to high PAC/PVC burden per zio patch results (6/2020).   Recent Holter did not reveal atrial fibrillation, bradycardia or pauses.      For anticoagulation for CHADS VASC 4 (CAD, Age++, HTN) he is taking warfarin with goal INR 2.5-3.5 due mechanical MVR.    At times his INR is high and his antibiotics had been interfering.      Coronary artery disease     CABG (LIMA to LAD and radial graft to RCA) in 2006.      Asymptomatic     Currently taking metoprolol tartrate 12.5 mg twice daily    No aspirin due to warfarin use    Currently taking Crestor 40 mg daily and Zetia 10 mg daily      Valvular heart disease.       AVR in 2006 with subsequent perivalvular leak and redo mechanical AVR and concomitant mechanical MVR in 2013    ECHO 6/2020 showed normal gradients    History of typical atrial flutter    successful catheter ablation in 04/2019.    carotid artery disease.    Noted to have less than 50% stenosis on right and left ICA and severe stenosis on left external carotid artery (Per ultrasound 2018)    Currently taking warfarin and Crestor.     Having intermittent dizziness with turning head.     Hypertension    Controlled    Plan:  US of carotid arteries  Follow up with Dr. Santo in early 2021      Patient expresses understanding and agreement with the plan.       I appreciate the chance to help with Fausto Farr Please let me know if you have any questions or concerns.    Anabell Xiao, APRN, CNP    This note was completed in part using Dragon voice recognition software. Although  reviewed after completion, some word and grammatical errors may occur.    Orders Placed This Encounter   Procedures     Follow-Up with Cardiologist     No orders of the defined types were placed in this encounter.    Medications Discontinued During This Encounter   Medication Reason     methocarbamol (ROBAXIN) 750 MG tablet Stopped by Patient         Encounter Diagnosis   Name Primary?     Valvular heart disease Yes       CURRENT MEDICATIONS:  Current Outpatient Medications   Medication Sig Dispense Refill     acetaminophen (TYLENOL) 500 MG tablet Take 500-1,000 mg by mouth every 6 hours as needed for pain       amoxicillin-clavulanate (AUGMENTIN) 875-125 MG tablet Take 1 tablet by mouth daily       betamethasone valerate (VALISONE) 0.1 % external lotion Apply topically 2 times daily Apply topically to scalp twice daily PRN 60 mL 3     cholecalciferol 25 MCG (1000 UT) TABS Take 1,000 Units by mouth daily       ezetimibe (ZETIA) 10 MG tablet Take 1 tablet (10 mg) by mouth daily 90 tablet 3     ferrous sulfate (FEROSUL) 325 (65 Fe) MG tablet Take 325 mg by mouth daily (with breakfast)       fluocinonide (LIDEX) 0.05 % external ointment Apply topically 2 times daily as needed       furosemide (LASIX) 40 MG tablet Take 0.5 tablets (20 mg) by mouth daily 45 tablet 3     glucosamine-chondroitin 500-400 MG CAPS per capsule Take 1 capsule by mouth 2 times daily       mesalamine (ASACOL HD) 800 MG EC tablet Take 1 tablet (800 mg) by mouth 2 times daily 180 tablet 3     metoprolol tartrate (LOPRESSOR) 25 MG tablet Take 12.5 mg ( 1/2 tablet) twice daily 90 tablet 3     rosuvastatin (CRESTOR) 40 MG tablet Take 1 tablet (40 mg) by mouth daily 90 tablet 3     tamsulosin (FLOMAX) 0.4 MG capsule TAKE 1 CAPSULE BY MOUTH EVERY DAY. 90 capsule 3     warfarin ANTICOAGULANT (COUMADIN) 10 MG tablet Take 20 mg every Wednesday and 25 mg all other days or as directed by the INR clinic 220 tablet 0     warfarin ANTICOAGULANT (COUMADIN) 5 MG  tablet Take 12.5mg every Mon,Wed & Fri / Take 10mg all other days OR as instructed by INR clinic 186 tablet 0       ALLERGIES     Allergies   Allergen Reactions     Bees Anaphylaxis       PAST MEDICAL HISTORY:  Past Medical History:   Diagnosis Date     Abdominal pain      Abnormal ECG     RBBB, 1st degree AVB, Left axis deviation     Anemia     currently taking iron     Arrhythmia     pac, pvc     Back pain     since 1980     BPH (benign prostatic hyperplasia)      Bruit      CAD (coronary artery disease)      Cellulitis 10/18/12     Cellulitis 05/2018    GrpB strep LLE cellulitis  negative RACHAEL for veg     Chronic venous insufficiency     bilat lower extremities     Contact dermatitis and other eczema, due to unspecified cause      Diaphragmatic hernia without mention of obstruction or gangrene      Diastolic HF (heart failure) (H)      Gastric ulcer      Glucose intolerance (impaired glucose tolerance)      Heart murmur 9/16/13    valvular heart disease     Hyperlipidemia LDL goal <100 8/6/2013     Hypertension 8/6/13     Lumbago      Malaise and fatigue      Metabolic syndrome      Mobitz (type) I (Wenckebach's) atrioventricular block     and RBBB     Nocturia 10/18/12     Nonallopathic lesion of cervical region      Nonallopathic lesion of lumbar region      Nonallopathic lesion of pelvic region, not elsewhere classified      Nonallopathic lesion of rib cage      Nonallopathic lesion of sacral region      GAGE (obstructive sleep apnea)     CPAP     Paroxysmal atrial fibrillation (H) 10/18/12     Prostate cancer (H) 2008    radiation seed, XRT      PVD (peripheral vascular disease) (H)      RBBB     1st degree AVB, RBBB, LAHB     Rotator cuff strain     and sprain     S/P AAA repair      S/P aortic valve replacement 2006    developed perivalve leak and MS, therefore redo surg 2013     S/P CABG (coronary artery bypass graft) 2006    Lima-Lad, RA-Rca     Sciatica of left side     since 2000     Sepsis due to group B  Streptococcus (H) 5/19/2018     Ulcerative colitis (H)      Varicose veins of bilateral lower extremities with other complications     s/p RLE vein stripping     Vitamin D deficiency        PAST SURGICAL HISTORY:  Past Surgical History:   Procedure Laterality Date     AORTIC VALVE REPLACEMENT  1/3/06    redo AVR SJM 21mm and SJM MVR 27mm in 2013SJM 21([id number]):AVR, SJM 27 :MVR-     APPLY WOUND VAC N/A 11/12/2019    Procedure: APPLICATION, WOUND VAC;  Surgeon: Sara Martinez MD;  Location:  OR     ARTHROPLASTY KNEE      right knee     ARTHROPLASTY KNEE Right 7/22/2019    Procedure: Right total knee arthroplasty;  Surgeon: Prasanth Jensen MD;  Location: [facility] OR     BACK SURGERY  Oct 2015    Fusion L4-5, laminectomy L2, L3     BYPASS GRAFT ARTERY CORONARY  10/2013    reimplantation of radial artery graft to RCA     C CABG, VEIN, TWO  1/3/06    Left radial to RCA, LIMA to LAD (RA to RCA reimplanted at time of 2013 surg)     CARDIAC CATHERIZATION  11/2005    Stent placed to RCA     CARDIAC CATHERIZATION  04/2013    Occluded RCA, patent LIMA to LAD and radial graft to PDA     CARPAL TUNNEL RELEASE RT/LT  1994     COLONOSCOPY  8-22-11     CYSTOSCOPY FLEXIBLE  10/16/2013    Procedure: CYSTOSCOPY FLEXIBLE;  FLEXIBLE CYSTOSCOPY / DILATION OF URETHRA / INSERTION OF LESLIE;  Surgeon: Cooper Wallace MD;  Location:  OR     ENDOVASCULAR REPAIR, INFRARENAL ABDOMINAL AORTIC ANEURYSM/DISSECTION; MODULAR BIFURCATED PROSTHESIS  2006    AAA repair endovascular     ENT SURGERY       EP ABLATION ATRIAL FLUTTER N/A 4/22/2019    Procedure: EP Ablation Atrial Flutter;  Surgeon: Jessy Vasquez MD;  Location:  [facility]     GENITOURINARY SURGERY  6/16/08    radioactive seeding     GRAFT FLAP PEDICLE EXTREMITY (LOCATION) Right 11/12/2019    Procedure: SURGICAL PROCUREMENT, FLAP, PEDICLE, EXTREMITY;  Surgeon: Sara Martinez MD;  Location:  OR     GRAFT SKIN SPLIT THICKNESS FROM  EXTREMITY Right 11/12/2019    Procedure: RIGHT GASTRONEMIUS FLAP TO RIGHT KNEE, SPLIT THICKNESS SKIN GRAFT FROM RIGHT THIGH TO RIGHT KNEE, SURGICAL PROCUREMENT, FLAP, PEDICLE, EXTREMITY, WOUND VAC PLACEMENT;  Surgeon: Sara Martinez MD;  Location:  OR     HEAD & NECK SURGERY  1997    vocal cord polypectomy     INCISION AND DRAINAGE KNEE, COMBINED Right 8/29/2019    Procedure: INCISION AND DRAINAGE, KNEE W/ Secondary Wound Closure;  Surgeon: Prasanth Jensen MD;  Location:  OR     IRRIGATION AND DEBRIDEMENT KNEE, PLACE ANTIBIOTIC CEMENT BEADS / SPACE Right 2/12/2020    Procedure: 1. Irrigation and debridement of wound breakdown, right total knee arthroplasty.  2. Irrigation and debridement of right total knee with placement of antibiotic-impregnated (vancomycin) absorbable antibiotic beads.;  Surgeon: Prasanth Jensen MD;  Location:  OR     IRRIGATION AND DEBRIDEMENT LOWER EXTREMITY, COMBINED Right 12/8/2019    Procedure: DEBRIDEMENT OF RIGHT CALF AND WOUND CLOSURE.;  Surgeon: Sara Martinez MD;  Location:  OR     KNEE SURGERY  2001 Right knee arthroscopy     OPTICAL TRACKING SYSTEM FUSION SPINE POSTERIOR LUMBAR THREE+ LEVELS N/A 10/29/2015    Procedure: OPTICAL TRACKING SYSTEM FUSION SPINE POSTERIOR LUMBAR THREE+ LEVELS;  Surgeon: Walt Garcia MD;  Location:  OR     PROSTATE SURGERY      radioactive seeding 6/16/08     REPAIR ANEURYSM ABDOMINAL AORTA  06/08     REPAIR VALVE MITRAL  10/16/2013    SJM 21(AGFN 756):AVR, SJM 27  501:MVRProcedure: REPAIR VALVE MITRAL;  REDO STERNOTOMY/REDO AORTIC VALVE REPLACEMENT/ MITRAL VALVE REPLACEMENT/REIMPLANTATION OF RIGHT CORONARY ARTERY BYPASS WITH RACHAEL ( ON PUMP);  Surgeon: Viet Singh MD;  Location:  OR     REPLACE VALVE AORTIC  10/16/2013    Procedure: REPLACE VALVE AORTIC;;  Surgeon: Viet Singh MD;  Location:  OR     SURGERY GENERAL IP CONSULT  05/2008 Excision aneurysm abdominal aorta     SURGERY GENERAL IP  CONSULT   Vocal cord polypectomy     VASCULAR SURGERY  1993     varicose vein stripping       FAMILY HISTORY:  Family History   Problem Relation Age of Onset     No Known Problems Mother      Coronary Artery Disease Father         CABG     Heart Disease Father         Pacemaker     No Known Problems Brother      No Known Problems Sister      No Known Problems Son      Other Cancer Daughter      No Known Problems Daughter      Heart Disease Brother      Other - See Comments Grandchild        SOCIAL HISTORY:  Social History     Socioeconomic History     Marital status:      Spouse name: None     Number of children: None     Years of education: None     Highest education level: None   Occupational History     None   Social Needs     Financial resource strain: None     Food insecurity     Worry: None     Inability: None     Transportation needs     Medical: None     Non-medical: None   Tobacco Use     Smoking status: Former Smoker     Packs/day: 1.00     Years: 40.00     Pack years: 40.00     Start date: 4/15/1962     Quit date: 10/23/2002     Years since quittin.0     Smokeless tobacco: Never Used   Substance and Sexual Activity     Alcohol use: Yes     Frequency: 2-4 times a month     Drinks per session: 1 or 2     Comment: a couple beers per week (socially)     Drug use: No     Sexual activity: Not Currently     Partners: Female   Lifestyle     Physical activity     Days per week: None     Minutes per session: None     Stress: None   Relationships     Social connections     Talks on phone: None     Gets together: None     Attends Oriental orthodox service: None     Active member of club or organization: None     Attends meetings of clubs or organizations: None     Relationship status: None     Intimate partner violence     Fear of current or ex partner: None     Emotionally abused: None     Physically abused: None     Forced sexual activity: None   Other Topics Concern     Parent/sibling w/ CABG, MI or  "angioplasty before 65F 55M? Yes     Comment: Brother had bypass at 55      Service Not Asked     Blood Transfusions Not Asked     Caffeine Concern No     Comment: 6-8 cups of half and half per day     Occupational Exposure Not Asked     Hobby Hazards Not Asked     Sleep Concern Not Asked     Stress Concern Not Asked     Weight Concern Not Asked     Special Diet No     Back Care Not Asked     Exercise No     Bike Helmet Not Asked     Seat Belt Not Asked     Self-Exams Not Asked   Social History Narrative     None       Review of Systems:  Skin:  Negative     Eyes:  Positive for glasses;cataracts  ENT:  Positive for hearing loss  Respiratory:    shortness of breath;sleep apnea;CPAP  Cardiovascular:  Negative    Gastroenterology: Negative    Genitourinary:  Negative    Musculoskeletal:  Positive for back pain  Neurologic:  Negative    Psychiatric:  Negative    Heme/Lymph/Imm:  Negative    Endocrine:  Negative      Physical Exam:  Vitals: /59 (BP Location: Right arm, Patient Position: Sitting, Cuff Size: Adult Large)   Pulse 52   Ht 1.664 m (5' 5.5\")   Wt 96.6 kg (213 lb)   SpO2 100%   BMI 34.91 kg/m      Constitutional:  cooperative, alert and oriented, well developed, well nourished, in no acute distress obese      Skin:  warm and dry to the touch, no apparent skin lesions or masses noted        Head:  normocephalic;normocephalic, no masses or lesions        Eyes:  pupils equal and round        ENT:  no pallor or cyanosis        Neck:  carotid pulses are full and equal bilaterally, JVP normal, no carotid bruit        Chest:  clear to auscultation        Cardiac: regular rhythm, normal S1/S2, no S3 or S4, apical impulse not displaced, no murmurs, gallops or rubs                  Abdomen:  abdomen soft obese      Vascular:       right radial artery;1+             left radial artery;1+                  Extremities and Back:  no deformities, clubbing, cyanosis, erythema observed        Neurological:  no " gross motor deficits          Recent Lab Results:  LIPID RESULTS:  Lab Results   Component Value Date    CHOL 152 06/01/2020    HDL 34 (L) 06/01/2020    LDL 90 06/01/2020    TRIG 138 06/01/2020    CHOLHDLRATIO 3.6 06/03/2015       LIVER ENZYME RESULTS:  Lab Results   Component Value Date    AST 19 06/01/2020    ALT 16 06/01/2020       CBC RESULTS:  Lab Results   Component Value Date    WBC 7.6 06/15/2020    RBC 4.44 06/15/2020    HGB 13.0 (L) 06/15/2020    HCT 40.0 06/15/2020    MCV 90 06/15/2020    MCH 29.3 06/15/2020    MCHC 32.5 06/15/2020    RDW 13.3 06/15/2020     06/15/2020       BMP RESULTS:  Lab Results   Component Value Date     06/01/2020    POTASSIUM 4.0 06/01/2020    CHLORIDE 106 06/01/2020    CO2 26 06/01/2020    ANIONGAP 4 06/01/2020     (H) 06/01/2020    BUN 13 06/01/2020    CR 0.73 06/01/2020    GFRESTIMATED 72 10/06/2020    GFRESTBLACK 87 10/06/2020    AWILDA 8.5 06/01/2020        INR RESULTS:  Lab Results   Component Value Date    INR 3.90 (H) 10/27/2020    INR 3.10 (H) 10/09/2020           CC  Jodi Flower MD  [address] [place] [address] [place] [address]                    Thank you for allowing me to participate in the care of your patient.      Sincerely,     ADELE Salomon [facility]    cc:   Jodi Flower MD  [address] [place] [address] [place] [address]

## 2020-11-02 NOTE — LETTER
11/2/2020    Chris Flower MD  [address] [place] [address]    RE: Fausto Farr       Dear Colleague,    I had the pleasure of seeing Fausto Farr in the [facility].    HPI:  Fausto Farr is a 80 year old male who presents for 6 month cardiology follow up.  He is a patient of  Dr. Santo.  His medical history includes     1. Valvular heart disease.   AVR in 2006 with subsequent perivalvular leak and redo mechanical AVR and concomitant mechanical MVR in 2013  2. Coronary artery disease.  CABG (LIMA to LAD and radial graft to RCA) in 2006.  3. Chronic diastolic heart failure.  LVEF is 55%-60%.   4.  Previous endovascular AAA repair.   5.  AV conduction disease with bifascicular block and prolonged FL.    6.  Obstructive sleep apnea with use of CPAP.   7.  Hypertension.    9.  Chronic back pain.   10.  internal carotid artery disease.  Severe stenosis of left external carotid artery.   11.   History of typical atrial flutter, successful catheter ablation in 04/2019.   12.  Varicose veins with venous insufficiency.  Treatment with VenaSeal closure, sclerotherapy and phlebectomy by Dr. Whitman in 01/2019.  13.  Asymptomatic NSVT.      In November 2019, patient was hospitalized for nonhealing left lower leg wound for debridement and skin grafting.  Postoperatively he was found to be bradycardic with second-degree AV block and often 2-1 AV conduction.  At that time patient reported intermittent episodes of lightheadedness lasting no longer than 10 seconds.  Pedro Jeffries did not recommend proceeding with a pacemaker until his leg is fully healed     On 12/11, he was re-admitted to the hospital for dehiscence of his right posterior calf wound and he wore a 30 day monitor which revealed episodes of atrial fibrillation with RVR and episodes of and NSVT, no bradycardia, no pauses.         In 6/2020, pt met with Dr. Vasquez regarding arrhythmias as he had underlying  conduction disease of which his metoprolol tartrate 25 mg twice daily had been stopped.  A leadless cardiac monitor which revealed frequent PVCs, burden 11%, with over 500 runs of nonsustained VT.  He also had frequent PACs (5-6%) with 3,000 runs of SVT.  No periods of AV block.  Ralph was asymptomtatic but due to the high burden an ECHO was ordered, metoprolol tartrate 12.5 mg twice daily and repeat monitor.      The ECHO (7/2020) revealed mechanical mitral valve and mechanical aortic valve with normal gradients, EF 50-55% but no changes compared to previous ECHO    24 Hour Holter monitor revealed sinus rhythm with average HR 73 bpm, 8% PVC burden, 1-2% PAC burden which improved with metoprolol and per Dr. Vasquez no need for antiarrhythmic medications or ppm.        On 10/19/2020, he met with vascular medicine to discuss CT findings of mild interval increase in size of the infrarenal abdominal aortic aneurysm sac.  There is evidence for Type II endoleaks in  The excluded aneurysm sac.  There is also severe stenoses at the origins of the celiac trunk and superior mesenteric arteries and discussed he has abdominal discomfort to proceed to angiogram with possible stenting.     Today he reports dizziness when he lifts his head fast and shortness of breath with exertion such as carrying items but denies associated symptoms such as chest pain or pressure. In the past year pt had lost 30# with his infection but has regained 20#.   Due to multiple antibiotics patient's INR levels were labile but did not have bleeding issues.   Overall he denies syncope, dyspnea at rest, palpitations, orthopnea, or PND.        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     This occurred at night while on metoprolol tartrate 25 mg twice daily     30 day monitor (11/2019) revealed episodes of atrial fibrillation with rates between  bpm, intermittent NSVT, there were no episodes of bradycardia or  pauses.  Repeat monitor revealed increased PVC burden and pt was restarted on metoprolol 12.5 mg twice daily    Monitor (9/2020) while on metoprolol tartrate 12.5 mg twice daily revealed no pauses, 8% PVC burden.  No pacemaker needed at this time.        Paroxsymal Atrial fibrillation    On 11/2019, pt's metoprolol tartrate was discontinued due to second degree AV and Dr. Vasquez restarted metoprolol tartrate 12.5 mg twice daily due to high PAC/PVC burden per zio patch results (6/2020).   Recent Holter did not reveal atrial fibrillation, bradycardia or pauses.      For anticoagulation for CHADS VASC 4 (CAD, Age++, HTN) he is taking warfarin with goal INR 2.5-3.5 due mechanical MVR.    At times his INR is high and his antibiotics had been interfering.      Coronary artery disease     CABG (LIMA to LAD and radial graft to RCA) in 2006.      Asymptomatic     Currently taking metoprolol tartrate 12.5 mg twice daily    No aspirin due to warfarin use    Currently taking Crestor 40 mg daily and Zetia 10 mg daily      Valvular heart disease.       AVR in 2006 with subsequent perivalvular leak and redo mechanical AVR and concomitant mechanical MVR in 2013    ECHO 6/2020 showed normal gradients    History of typical atrial flutter    successful catheter ablation in 04/2019.    carotid artery disease.    Noted to have less than 50% stenosis on right and left ICA and severe stenosis on left external carotid artery (Per ultrasound 2018)    Currently taking warfarin and Crestor.     Having intermittent dizziness with turning head.     Hypertension    Controlled    Plan:  US of carotid arteries  Follow up with Dr. Santo in early 2021      Patient expresses understanding and agreement with the plan.       I appreciate the chance to help with Fausto Farr Please let me know if you have any questions or concerns.    Anabell Xiao, APRN, CNP    This note was completed in part using Dragon voice recognition software. Although  reviewed after completion, some word and grammatical errors may occur.    Orders Placed This Encounter   Procedures     Follow-Up with Cardiologist     No orders of the defined types were placed in this encounter.    Medications Discontinued During This Encounter   Medication Reason     methocarbamol (ROBAXIN) 750 MG tablet Stopped by Patient         Encounter Diagnosis   Name Primary?     Valvular heart disease Yes       CURRENT MEDICATIONS:  Current Outpatient Medications   Medication Sig Dispense Refill     acetaminophen (TYLENOL) 500 MG tablet Take 500-1,000 mg by mouth every 6 hours as needed for pain       amoxicillin-clavulanate (AUGMENTIN) 875-125 MG tablet Take 1 tablet by mouth daily       betamethasone valerate (VALISONE) 0.1 % external lotion Apply topically 2 times daily Apply topically to scalp twice daily PRN 60 mL 3     cholecalciferol 25 MCG (1000 UT) TABS Take 1,000 Units by mouth daily       ezetimibe (ZETIA) 10 MG tablet Take 1 tablet (10 mg) by mouth daily 90 tablet 3     ferrous sulfate (FEROSUL) 325 (65 Fe) MG tablet Take 325 mg by mouth daily (with breakfast)       fluocinonide (LIDEX) 0.05 % external ointment Apply topically 2 times daily as needed       furosemide (LASIX) 40 MG tablet Take 0.5 tablets (20 mg) by mouth daily 45 tablet 3     glucosamine-chondroitin 500-400 MG CAPS per capsule Take 1 capsule by mouth 2 times daily       mesalamine (ASACOL HD) 800 MG EC tablet Take 1 tablet (800 mg) by mouth 2 times daily 180 tablet 3     metoprolol tartrate (LOPRESSOR) 25 MG tablet Take 12.5 mg ( 1/2 tablet) twice daily 90 tablet 3     rosuvastatin (CRESTOR) 40 MG tablet Take 1 tablet (40 mg) by mouth daily 90 tablet 3     tamsulosin (FLOMAX) 0.4 MG capsule TAKE 1 CAPSULE BY MOUTH EVERY DAY. 90 capsule 3     warfarin ANTICOAGULANT (COUMADIN) 10 MG tablet Take 20 mg every Wednesday and 25 mg all other days or as directed by the INR clinic 220 tablet 0     warfarin ANTICOAGULANT (COUMADIN) 5 MG  tablet Take 12.5mg every Mon,Wed & Fri / Take 10mg all other days OR as instructed by INR clinic 186 tablet 0       ALLERGIES     Allergies   Allergen Reactions     Bees Anaphylaxis       PAST MEDICAL HISTORY:  Past Medical History:   Diagnosis Date     Abdominal pain      Abnormal ECG     RBBB, 1st degree AVB, Left axis deviation     Anemia     currently taking iron     Arrhythmia     pac, pvc     Back pain     since 1980     BPH (benign prostatic hyperplasia)      Bruit      CAD (coronary artery disease)      Cellulitis 10/18/12     Cellulitis 05/2018    GrpB strep LLE cellulitis  negative RACHAEL for veg     Chronic venous insufficiency     bilat lower extremities     Contact dermatitis and other eczema, due to unspecified cause      Diaphragmatic hernia without mention of obstruction or gangrene      Diastolic HF (heart failure) (H)      Gastric ulcer      Glucose intolerance (impaired glucose tolerance)      Heart murmur 9/16/13    valvular heart disease     Hyperlipidemia LDL goal <100 8/6/2013     Hypertension 8/6/13     Lumbago      Malaise and fatigue      Metabolic syndrome      Mobitz (type) I (Wenckebach's) atrioventricular block     and RBBB     Nocturia 10/18/12     Nonallopathic lesion of cervical region      Nonallopathic lesion of lumbar region      Nonallopathic lesion of pelvic region, not elsewhere classified      Nonallopathic lesion of rib cage      Nonallopathic lesion of sacral region      GAGE (obstructive sleep apnea)     CPAP     Paroxysmal atrial fibrillation (H) 10/18/12     Prostate cancer (H) 2008    radiation seed, XRT      PVD (peripheral vascular disease) (H)      RBBB     1st degree AVB, RBBB, LAHB     Rotator cuff strain     and sprain     S/P AAA repair      S/P aortic valve replacement 2006    developed perivalve leak and MS, therefore redo surg 2013     S/P CABG (coronary artery bypass graft) 2006    Lima-Lad, RA-Rca     Sciatica of left side     since 2000     Sepsis due to group B  Streptococcus (H) 5/19/2018     Ulcerative colitis (H)      Varicose veins of bilateral lower extremities with other complications     s/p RLE vein stripping     Vitamin D deficiency        PAST SURGICAL HISTORY:  Past Surgical History:   Procedure Laterality Date     AORTIC VALVE REPLACEMENT  1/3/06    redo AVR SJM 21mm and SJM MVR 27mm in 2013SJM 21([id number]):AVR, SJM 27 :MVR-     APPLY WOUND VAC N/A 11/12/2019    Procedure: APPLICATION, WOUND VAC;  Surgeon: Sara Martinez MD;  Location:  OR     ARTHROPLASTY KNEE      right knee     ARTHROPLASTY KNEE Right 7/22/2019    Procedure: Right total knee arthroplasty;  Surgeon: Prasanth Jensen MD;  Location: [facility] OR     BACK SURGERY  Oct 2015    Fusion L4-5, laminectomy L2, L3     BYPASS GRAFT ARTERY CORONARY  10/2013    reimplantation of radial artery graft to RCA     C CABG, VEIN, TWO  1/3/06    Left radial to RCA, LIMA to LAD (RA to RCA reimplanted at time of 2013 surg)     CARDIAC CATHERIZATION  11/2005    Stent placed to RCA     CARDIAC CATHERIZATION  04/2013    Occluded RCA, patent LIMA to LAD and radial graft to PDA     CARPAL TUNNEL RELEASE RT/LT  1994     COLONOSCOPY  8-22-11     CYSTOSCOPY FLEXIBLE  10/16/2013    Procedure: CYSTOSCOPY FLEXIBLE;  FLEXIBLE CYSTOSCOPY / DILATION OF URETHRA / INSERTION OF LESLIE;  Surgeon: Cooper Wallace MD;  Location:  OR     ENDOVASCULAR REPAIR, INFRARENAL ABDOMINAL AORTIC ANEURYSM/DISSECTION; MODULAR BIFURCATED PROSTHESIS  2006    AAA repair endovascular     ENT SURGERY       EP ABLATION ATRIAL FLUTTER N/A 4/22/2019    Procedure: EP Ablation Atrial Flutter;  Surgeon: Jessy Vasquez MD;  Location:  [facility] LAB     GENITOURINARY SURGERY  6/16/08    radioactive seeding     GRAFT FLAP PEDICLE EXTREMITY (LOCATION) Right 11/12/2019    Procedure: SURGICAL PROCUREMENT, FLAP, PEDICLE, EXTREMITY;  Surgeon: Sara Martinez MD;  Location:  OR     GRAFT SKIN SPLIT THICKNESS FROM  EXTREMITY Right 11/12/2019    Procedure: RIGHT GASTRONEMIUS FLAP TO RIGHT KNEE, SPLIT THICKNESS SKIN GRAFT FROM RIGHT THIGH TO RIGHT KNEE, SURGICAL PROCUREMENT, FLAP, PEDICLE, EXTREMITY, WOUND VAC PLACEMENT;  Surgeon: Sara Martinez MD;  Location:  OR     HEAD & NECK SURGERY  1997    vocal cord polypectomy     INCISION AND DRAINAGE KNEE, COMBINED Right 8/29/2019    Procedure: INCISION AND DRAINAGE, KNEE W/ Secondary Wound Closure;  Surgeon: Prasanth Jensen MD;  Location:  OR     IRRIGATION AND DEBRIDEMENT KNEE, PLACE ANTIBIOTIC CEMENT BEADS / SPACE Right 2/12/2020    Procedure: 1. Irrigation and debridement of wound breakdown, right total knee arthroplasty.  2. Irrigation and debridement of right total knee with placement of antibiotic-impregnated (vancomycin) absorbable antibiotic beads.;  Surgeon: Prasanth Jensen MD;  Location:  OR     IRRIGATION AND DEBRIDEMENT LOWER EXTREMITY, COMBINED Right 12/8/2019    Procedure: DEBRIDEMENT OF RIGHT CALF AND WOUND CLOSURE.;  Surgeon: Sara Martinez MD;  Location:  OR     KNEE SURGERY  2001 Right knee arthroscopy     OPTICAL TRACKING SYSTEM FUSION SPINE POSTERIOR LUMBAR THREE+ LEVELS N/A 10/29/2015    Procedure: OPTICAL TRACKING SYSTEM FUSION SPINE POSTERIOR LUMBAR THREE+ LEVELS;  Surgeon: Walt Garcia MD;  Location:  OR     PROSTATE SURGERY      radioactive seeding 6/16/08     REPAIR ANEURYSM ABDOMINAL AORTA  06/08     REPAIR VALVE MITRAL  10/16/2013    SJM 21(AGFN 756):AVR, SJM 27  501:MVRProcedure: REPAIR VALVE MITRAL;  REDO STERNOTOMY/REDO AORTIC VALVE REPLACEMENT/ MITRAL VALVE REPLACEMENT/REIMPLANTATION OF RIGHT CORONARY ARTERY BYPASS WITH RACHAEL ( ON PUMP);  Surgeon: Viet Singh MD;  Location:  OR     REPLACE VALVE AORTIC  10/16/2013    Procedure: REPLACE VALVE AORTIC;;  Surgeon: Viet Singh MD;  Location:  OR     SURGERY GENERAL IP CONSULT  05/2008 Excision aneurysm abdominal aorta     SURGERY GENERAL IP  CONSULT   Vocal cord polypectomy     VASCULAR SURGERY  1993     varicose vein stripping       FAMILY HISTORY:  Family History   Problem Relation Age of Onset     No Known Problems Mother      Coronary Artery Disease Father         CABG     Heart Disease Father         Pacemaker     No Known Problems Brother      No Known Problems Sister      No Known Problems Son      Other Cancer Daughter      No Known Problems Daughter      Heart Disease Brother      Other - See Comments Grandchild        SOCIAL HISTORY:  Social History     Socioeconomic History     Marital status:      Spouse name: None     Number of children: None     Years of education: None     Highest education level: None   Occupational History     None   Social Needs     Financial resource strain: None     Food insecurity     Worry: None     Inability: None     Transportation needs     Medical: None     Non-medical: None   Tobacco Use     Smoking status: Former Smoker     Packs/day: 1.00     Years: 40.00     Pack years: 40.00     Start date: 4/15/1962     Quit date: 10/23/2002     Years since quittin.0     Smokeless tobacco: Never Used   Substance and Sexual Activity     Alcohol use: Yes     Frequency: 2-4 times a month     Drinks per session: 1 or 2     Comment: a couple beers per week (socially)     Drug use: No     Sexual activity: Not Currently     Partners: Female   Lifestyle     Physical activity     Days per week: None     Minutes per session: None     Stress: None   Relationships     Social connections     Talks on phone: None     Gets together: None     Attends Restorationist service: None     Active member of club or organization: None     Attends meetings of clubs or organizations: None     Relationship status: None     Intimate partner violence     Fear of current or ex partner: None     Emotionally abused: None     Physically abused: None     Forced sexual activity: None   Other Topics Concern     Parent/sibling w/ CABG, MI or  "angioplasty before 65F 55M? Yes     Comment: Brother had bypass at 55      Service Not Asked     Blood Transfusions Not Asked     Caffeine Concern No     Comment: 6-8 cups of half and half per day     Occupational Exposure Not Asked     Hobby Hazards Not Asked     Sleep Concern Not Asked     Stress Concern Not Asked     Weight Concern Not Asked     Special Diet No     Back Care Not Asked     Exercise No     Bike Helmet Not Asked     Seat Belt Not Asked     Self-Exams Not Asked   Social History Narrative     None       Review of Systems:  Skin:  Negative     Eyes:  Positive for glasses;cataracts  ENT:  Positive for hearing loss  Respiratory:    shortness of breath;sleep apnea;CPAP  Cardiovascular:  Negative    Gastroenterology: Negative    Genitourinary:  Negative    Musculoskeletal:  Positive for back pain  Neurologic:  Negative    Psychiatric:  Negative    Heme/Lymph/Imm:  Negative    Endocrine:  Negative      Physical Exam:  Vitals: /59 (BP Location: Right arm, Patient Position: Sitting, Cuff Size: Adult Large)   Pulse 52   Ht 1.664 m (5' 5.5\")   Wt 96.6 kg (213 lb)   SpO2 100%   BMI 34.91 kg/m      Constitutional:  cooperative, alert and oriented, well developed, well nourished, in no acute distress obese      Skin:  warm and dry to the touch, no apparent skin lesions or masses noted        Head:  normocephalic;normocephalic, no masses or lesions        Eyes:  pupils equal and round        ENT:  no pallor or cyanosis        Neck:  carotid pulses are full and equal bilaterally, JVP normal, no carotid bruit        Chest:  clear to auscultation        Cardiac: regular rhythm, normal S1/S2, no S3 or S4, apical impulse not displaced, no murmurs, gallops or rubs                  Abdomen:  abdomen soft obese      Vascular:       right radial artery;1+             left radial artery;1+                  Extremities and Back:  no deformities, clubbing, cyanosis, erythema observed        Neurological:  no " gross motor deficits          Recent Lab Results:  LIPID RESULTS:  Lab Results   Component Value Date    CHOL 152 06/01/2020    HDL 34 (L) 06/01/2020    LDL 90 06/01/2020    TRIG 138 06/01/2020    CHOLHDLRATIO 3.6 06/03/2015       LIVER ENZYME RESULTS:  Lab Results   Component Value Date    AST 19 06/01/2020    ALT 16 06/01/2020       CBC RESULTS:  Lab Results   Component Value Date    WBC 7.6 06/15/2020    RBC 4.44 06/15/2020    HGB 13.0 (L) 06/15/2020    HCT 40.0 06/15/2020    MCV 90 06/15/2020    MCH 29.3 06/15/2020    MCHC 32.5 06/15/2020    RDW 13.3 06/15/2020     06/15/2020       BMP RESULTS:  Lab Results   Component Value Date     06/01/2020    POTASSIUM 4.0 06/01/2020    CHLORIDE 106 06/01/2020    CO2 26 06/01/2020    ANIONGAP 4 06/01/2020     (H) 06/01/2020    BUN 13 06/01/2020    CR 0.73 06/01/2020    GFRESTIMATED 72 10/06/2020    GFRESTBLACK 87 10/06/2020    AWILDA 8.5 06/01/2020        INR RESULTS:  Lab Results   Component Value Date    INR 3.90 (H) 10/27/2020    INR 3.10 (H) 10/09/2020         Thank you for allowing me to participate in the care of your patient.    Sincerely,     ADELE [facility]

## 2020-11-02 NOTE — PATIENT INSTRUCTIONS
If you have problems or questions please call the RNs (Bethanie Robledo, & Gema) at [phone]      I will look into if we should do an ultrasound or CT of your neck arteries.   If you don't hear from me in the next week call me.    Try to see Dr. Santo in early 2021 as you haven't seen him since 7/2019      No pacemaker is needed at this time.

## 2020-11-06 ENCOUNTER — HOSPITAL ENCOUNTER (OUTPATIENT)
Dept: CARDIOLOGY | Facility: CLINIC | Age: 80
Discharge: HOME | End: 2020-11-06
Attending: NURSE PRACTITIONER | Admitting: NURSE PRACTITIONER
Payer: MEDICARE

## 2020-11-06 DIAGNOSIS — R42 DIZZINESS: ICD-10-CM

## 2020-11-06 DIAGNOSIS — I73.9 PVD (PERIPHERAL VASCULAR DISEASE) (H): ICD-10-CM

## 2020-11-06 PROCEDURE — 93880 EXTRACRANIAL BILAT STUDY: CPT

## 2020-11-06 PROCEDURE — 93880 EXTRACRANIAL BILAT STUDY: CPT | Mod: 26 | Performed by: INTERNAL MEDICINE

## 2020-11-10 ENCOUNTER — TELEPHONE (OUTPATIENT)
Dept: CARDIOLOGY | Facility: CLINIC | Age: 80
End: 2020-11-10

## 2020-11-10 ENCOUNTER — TRANSFERRED RECORDS (OUTPATIENT)
Dept: HEALTH INFORMATION MANAGEMENT | Facility: CLINIC | Age: 80
End: 2020-11-10

## 2020-11-10 NOTE — TELEPHONE ENCOUNTER
Patient called requesting results of US of carotid arteries completed on 11/6.   This has not been released.  Will have Anabell Xiao, AURELIA review.  JUSTIN Vargas

## 2020-11-13 NOTE — TELEPHONE ENCOUNTER
11/13/20 Spoke with patient and explained results . Pt voiced understanding and agreement w plan.  Kylie 437 pm

## 2020-11-13 NOTE — TELEPHONE ENCOUNTER
Can you call Fausto Farr and let him know the results of his US - No significant change from prior study dated 7/10/2018.  Follow up with Dr. Santo in early 2021    Impression:     1. Right side: by velocity criteria there is <50% diameter stenosis of  the internal carotid artery.      2. Left side: by velocity criteria there is <50% diameter stenosis of  the internal carotid artery.      3. Normal bilateral vertebral artery flow.     4. Elevated velocities in both external carotid arteries consistent  with >50% diameter stenosis.      No significant change from prior study dated 7/10/2018.

## 2020-11-13 NOTE — TELEPHONE ENCOUNTER
11/13/20 Pt called inquiring again about results od Carotid US. Informed him results have to be reviewed and needs to be released by Anabell . Will call pt as soon as they are available. He voiced understanding and agreement w laci Espinal 3 pm

## 2020-11-17 ENCOUNTER — ANTICOAGULATION THERAPY VISIT (OUTPATIENT)
Dept: ANTICOAGULATION | Facility: CLINIC | Age: 80
End: 2020-11-17
Payer: MEDICARE

## 2020-11-17 DIAGNOSIS — Z95.2 S/P AORTIC VALVE REPLACEMENT: ICD-10-CM

## 2020-11-17 DIAGNOSIS — Z79.01 LONG TERM CURRENT USE OF ANTICOAGULANT THERAPY: ICD-10-CM

## 2020-11-17 DIAGNOSIS — I48.19 PERSISTENT ATRIAL FIBRILLATION (H): ICD-10-CM

## 2020-11-17 DIAGNOSIS — Z95.2 S/P MITRAL VALVE REPLACEMENT: ICD-10-CM

## 2020-11-17 DIAGNOSIS — I48.0 PAROXYSMAL ATRIAL FIBRILLATION (H): ICD-10-CM

## 2020-11-17 DIAGNOSIS — I48.91 AF (ATRIAL FIBRILLATION) (H): ICD-10-CM

## 2020-11-17 LAB
CAPILLARY BLOOD COLLECTION: NORMAL
INR PPP: 2.6 (ref 0.86–1.14)

## 2020-11-17 PROCEDURE — 99207 PR NO CHARGE NURSE ONLY: CPT

## 2020-11-17 PROCEDURE — 36416 COLLJ CAPILLARY BLOOD SPEC: CPT | Performed by: INTERNAL MEDICINE

## 2020-11-17 PROCEDURE — 85610 PROTHROMBIN TIME: CPT | Performed by: INTERNAL MEDICINE

## 2020-11-17 NOTE — PROGRESS NOTES
ANTICOAGULATION FOLLOW-UP CLINIC VISIT    Patient Name:  Fausto Farr  Date:  2020  Contact Type:  Telephone/ discussed with patient    SUBJECTIVE:  Patient Findings     Positives:  Upcoming invasive procedure (cataract surgery scheduled on 20)    Comments:  The patient was assessed for diet, medication, and activity level changes, missed or extra doses, bruising or bleeding, with no problem findings.  Maintenance dose was adjusted with last INR check.  Will consider increasing maintenance dose if INR becomes sub therapeutic with next change since INR dropped significantly since last INR check.          Clinical Outcomes     Negatives:  Major bleeding event, Thromboembolic event, Anticoagulation-related hospital admission, Anticoagulation-related ED visit, Anticoagulation-related fatality    Comments:  The patient was assessed for diet, medication, and activity level changes, missed or extra doses, bruising or bleeding, with no problem findings.  Maintenance dose was adjusted with last INR check.  Will consider increasing maintenance dose if INR becomes sub therapeutic with next change since INR dropped significantly since last INR check.             OBJECTIVE    Recent labs: (last 7 days)     20  0801   INR 2.60*       ASSESSMENT / PLAN  INR assessment THER    Recheck INR In: 2 WEEKS    INR Location Clinic      Anticoagulation Summary  As of 2020    INR goal:  2.5-3.5   TTR:  52.8 % (11.5 mo)   INR used for dosin.60 (2020)   Warfarin maintenance plan:  7.5 mg (5 mg x 1.5) every Tue, Thu, Sat; 5 mg (10 mg x 0.5) all other days   Full warfarin instructions:  7.5 mg every Tue, Thu, Sat; 5 mg all other days   Weekly warfarin total:  42.5 mg   No change documented:  Ruthann Sanders RN   Plan last modified:  Caryn Campos RN (10/27/2020)   Next INR check:  2020   Priority:  Maintenance   Target end date:  Indefinite    Indications    AF (atrial fibrillation) (H)  [I48.91]  S/P mitral valve replacement [Z95.2]  Long term current use of anticoagulant therapy [Z79.01]  Persistent atrial fibrillation (H) [I48.19]  S/P aortic valve replacement [Z95.2]             Anticoagulation Episode Summary     INR check location:      Preferred lab:  EXTERNAL LAB    Send INR reminders to:  SHANNA DOWELL    Comments:  Rifampin stopped early June 2020 - will need less warfarin with time.          Anticoagulation Care Providers     Provider Role Specialty Phone number    Jodi Flower Mai, MD Referring Internal Medicine - Pediatrics [phone]            See the Encounter Report to view Anticoagulation Flowsheet and Dosing Calendar (Go to Encounters tab in chart review, and find the Anticoagulation Therapy Visit)    Dosage adjustment made based on physician directed care plan.    Ruthann Sanders RN

## 2020-12-01 ENCOUNTER — ANTICOAGULATION THERAPY VISIT (OUTPATIENT)
Dept: PEDIATRICS | Facility: CLINIC | Age: 80
End: 2020-12-01

## 2020-12-01 ENCOUNTER — OFFICE VISIT (OUTPATIENT)
Dept: FAMILY MEDICINE | Facility: CLINIC | Age: 80
End: 2020-12-01
Payer: MEDICARE

## 2020-12-01 VITALS
WEIGHT: 215.2 LBS | HEART RATE: 83 BPM | DIASTOLIC BLOOD PRESSURE: 64 MMHG | TEMPERATURE: 97.5 F | SYSTOLIC BLOOD PRESSURE: 138 MMHG | BODY MASS INDEX: 35.27 KG/M2

## 2020-12-01 DIAGNOSIS — H26.8 OTHER CATARACT OF BOTH EYES: ICD-10-CM

## 2020-12-01 DIAGNOSIS — Z01.818 PREOP GENERAL PHYSICAL EXAM: Primary | ICD-10-CM

## 2020-12-01 DIAGNOSIS — I48.91 AF (ATRIAL FIBRILLATION) (H): ICD-10-CM

## 2020-12-01 DIAGNOSIS — Z95.2 S/P AORTIC VALVE REPLACEMENT: ICD-10-CM

## 2020-12-01 DIAGNOSIS — Z79.01 LONG TERM CURRENT USE OF ANTICOAGULANT THERAPY: ICD-10-CM

## 2020-12-01 DIAGNOSIS — I48.19 PERSISTENT ATRIAL FIBRILLATION (H): ICD-10-CM

## 2020-12-01 DIAGNOSIS — Z95.2 S/P MITRAL VALVE REPLACEMENT: ICD-10-CM

## 2020-12-01 LAB
CAPILLARY BLOOD COLLECTION: NORMAL
INR PPP: 3.8 (ref 0.86–1.14)

## 2020-12-01 PROCEDURE — 36416 COLLJ CAPILLARY BLOOD SPEC: CPT | Performed by: FAMILY MEDICINE

## 2020-12-01 PROCEDURE — 85610 PROTHROMBIN TIME: CPT | Performed by: FAMILY MEDICINE

## 2020-12-01 PROCEDURE — 99215 OFFICE O/P EST HI 40 MIN: CPT | Performed by: FAMILY MEDICINE

## 2020-12-01 RX ORDER — ATROPINE SULFATE 10 MG/ML
SOLUTION/ DROPS OPHTHALMIC
COMMUNITY
Start: 2020-11-13 | End: 2022-08-01

## 2020-12-01 RX ORDER — PREDNISOLONE ACETATE 10 MG/ML
SUSPENSION/ DROPS OPHTHALMIC
COMMUNITY
Start: 2020-11-13 | End: 2022-08-01

## 2020-12-01 NOTE — PATIENT INSTRUCTIONS
"Take your normal prescribed morning medications on day of surgery with a small amount of water. Do not take ibuprofen, naproxen, or aspirin before surgery. The INR nurse should work with you on your warfarin management. You should continue this therapy.     Patient Education   Preparing for Your Surgery  Getting started  A surgery nurse will call you to review your health history and instructions. They will give you an arrival time based on your scheduled surgery time.  Please be ready to share the following:    Your doctor's clinic name and phone number    Your medical, surgical and anesthesia history    A list of allergies and sensitivities    A list of medicines, including herbal treatments and over-the-counter drugs    Whether the patient has a legal guardian (ask how to send us the papers in advance)  If your child is having surgery, please ask for a copy of Preparing for Your Child's Surgery.    Preparing for surgery    Within 30 days of surgery: Have an exam at your family clinic (primary care clinic), or go to a pre-operative clinic. This exam is called a \"History and Physical,\" or H&P.    At your H&P exam, talk to your care team about all medicines you take. If you need to stop any medicines before surgery, ask when to start taking them again.  ? We do this for your safety. Many medicines can make you bleed too much during surgery. Some change how well surgery (anesthesia) drugs work.    Call your insurance company to see what it will and won't pay for. Ask if they need to pre-approve the surgery. (If no insurance, call [phone].)    Call your surgeon's clinic if there's any change in your health. This includes signs of a cold or flu (sore throat, runny nose, cough, rash, fever). It also includes a scrape or scratch near the surgery site.    If you have questions on the day of surgery, call your surgery center.  Eating and drinking guidelines  For your safety: Unless your surgeon tells you otherwise, " follow the guidelines below.    Eat and drink as usual until 8 hours before surgery. After that, no food or milk.    Drink clear liquids until 2 hours before surgery. These are liquids you can see through, like water, Gatorade and Propel Water. You may also have black coffee and tea (no cream or milk).    Nothing by mouth within 2 hours of surgery. This includes gum, candy and breath mints.    Stop alcohol the midnight before surgery.    If your family clinic tells you to take medicine on the morning of surgery, it's okay to take it with a sip of water.  Preventing infection    Shower or bathe the night before and morning of your surgery. Follow the instructions your clinic gave you. (If no instructions, use regular soap.)    Don't shave or clip hair near your surgery site. This can lead to skin infection.    Don't smoke the morning of surgery. Smoking increases the risk of infection. You may chew nicotine gum up to 2 hours before surgery. A nicotine patch is okay.  ? Note: Some surgeries require you to completely quit smoking and nicotine. Check with your surgeon.    Your care team will make every effort to keep you safe from infection. We will:  ? Clean our hands often with soap and water (or an alcohol-based hand rub).  ? Clean the skin at your surgery site with a special soap that kills germs. We'll also remove hair from the site as needed.  ? Wear special hair covers, masks, gowns and gloves during surgery.  ? Give antibiotic medicine, if prescribed. Not all surgeries need antibiotics.  What to bring on the day of surgery    Photo ID and insurance card    Copy of your health care directive, if you have one    Glasses and hearing aides (bring cases)  ? You can't wear contacts during surgery    Inhaler and eye drops, if you use them (tell us about these when you arrive)    CPAP machine or breathing device, if you use them    A few personal items, if spending the night    If you have . . .  ? A pacemaker or ICD  (cardiac defibrillator): Bring the ID card.  ? An implanted stimulator: Bring the remote control.  ? A legal guardian: Bring a copy of the certified (court-stamped) guardianship papers.  Please remove any jewelry, including body piercings. Leave jewelry and other valuables at home.  If you're going home the day of surgery  Important: If you don't follow the rules below, we must cancel your surgery.     Arrange for someone to drive you home after surgery. You may not drive, take a taxi or take public transportation by yourself (unless you'll have local anesthesia only).    Arrange for a responsible adult to stay with you overnight. If you don't, we may keep you in the hospital overnight, and you may need to pay the costs yourself.  Questions?   If you have any questions for your care team, list them here: _________________________________________________________________________________________________________________________________________________________________________________________________________________________________________________________________________________________________________________________  For informational purposes only. Not to replace the advice of your health care provider. Copyright   0322-5861 [facility]. All rights reserved. Clinically reviewed by Gabby Mcmahon MD. [facility] [id number] - REV 07/19.

## 2020-12-01 NOTE — PROGRESS NOTES
[facility]  [id number] [place] [id number]  Phone: [phone]  Primary Provider: Jodi Flower Mai  Pre-op Performing Provider: MARILUZ VICTORIA    PREOPERATIVE EVALUATION:  Today's date: 12/1/2020    Fausto Farr is a 80 year old male who presents for a preoperative evaluation.    Surgical Information:  Surgery/Procedure: Cataract surgery on left eye  Surgery Location: [facility]  Surgeon: Dr. Olivares  Surgery Date: 12/9/2020 left eye, 12/16/2020 right eye  Time of Surgery: tbd  Where patient plans to recover: At home with family  Fax number for surgical facility: [phone]    Type of Anesthesia Anticipated: topical/IV sedation    Subjective     HPI related to upcoming procedure: Patient has had worsening vision in both eyes due to cataracts.  He will be getting this treated through cataract removal and lens implant surgery during 2 separate surgical procedures.    Preop Questions 12/1/2020   1. Have you ever had a heart attack or stroke? No   2. Have you ever had surgery on your heart or blood vessels, such as a stent placement, a coronary artery bypass, or surgery on an artery in your head, neck, heart, or legs? YES - CABG 2006, Aortic and Mitral valve replacement 2013.   3. Do you have chest pain with activity? No   4. Do you have a history of  heart failure? YES - Better after heart valve replacement.   5. Do you currently have a cold, bronchitis or symptoms of other infection? No   6. Do you have a cough, shortness of breath, or wheezing? No   7. Do you or anyone in your family have previous history of blood clots? No   8. Do you or does anyone in your family have a serious bleeding problem such as prolonged bleeding following surgeries or cuts? No   9. Have you ever had problems with anemia or been told to take iron pills? YES - On iron supplement.   10. Have you had any abnormal blood loss such as black, tarry or bloody stools? No   11.  Have you ever had a blood transfusion? No   12. Are you willing to have a blood transfusion if it is medically needed before, during, or after your surgery? Yes   13. Have you or any of your relatives ever had problems with anesthesia? No   14. Do you have sleep apnea, excessive snoring or daytime drowsiness? YES    14a. Do you have a CPAP machine? Yes   15. Do you have any artifical heart valves or other implanted medical devices like a pacemaker, defibrillator, or continuous glucose monitor? YES    15a. What type of device do you have? mechanical heart valves   15b. Name of the clinic that manages your device:  [facility]   16. Do you have artificial joints? YES - right knee   17. Are you allergic to latex? No     Health Care Directive:  Patient has a Health Care Directive on file    Preoperative Review of :   reviewed - No recent controlled substance prescriptions. Patient not currently taking these.       Status of Chronic Conditions:  See problem list for active medical problems.  Problems all longstanding and stable, except as noted/documented.  See ROS for pertinent symptoms related to these conditions.    Review of Systems  Constitutional, neuro, ENT, endocrine, pulmonary, cardiac, gastrointestinal, genitourinary, musculoskeletal, integument and psychiatric systems are negative, except as otherwise noted.    The only concern mentioned by patient at exam is his chronic, and worsening vision in both eyes due to cataracts.    Patient states his symptoms related to his heart problems like his congestive heart failure symptoms are much improved with treatment, such as his heart valve replacement surgeries.  He has not had any recent trouble with breathing, and has not had any recent chest discomfort.  His echocardiogram from this past July is reassuring, with no problems noted with the mechanical heart valves per review of provider result note.  His left ventricular function was noted to be good,  "see following from report:    \"The visual ejection fraction is estimated at 50-55%. 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, septic arthritis of unspecified location (H) 02/14/2020     Priority: Medium     Wound dehiscence 02/12/2020     Priority: Medium     Septic joint (H) 02/10/2020     Priority: Medium     Open wound of right knee 11/10/2019     Priority: Medium     Total knee replacement status 07/22/2019     Priority: Medium     Knee pain, chronic 07/19/2019     Priority: Medium     Obesity (BMI 35.0-39.9) with comorbidity (H) 05/30/2019     Priority: Medium     Typical atrial flutter (H) 04/15/2019     Priority: Medium     Added automatically from request for surgery [id number]       GAGE (obstructive sleep apnea) 10/02/2017     Priority: Medium     Long term current use of anticoagulant therapy 03/21/2016     Priority: Medium     Chronic systolic congestive heart failure (H) 03/21/2016     Priority: Medium     S/P lumbar spinal fusion 10/29/2015     Priority: Medium     Personal history of tobacco use, presenting hazards to health 10/28/2015     Priority: Medium     Quit 2002       Spinal stenosis of lumbar region without neurogenic claudication 10/28/2015     Priority: Medium     S/P mitral valve replacement 10/28/2015     Priority: Medium     RBBB with left anterior fascicular block 10/28/2015     Priority: Medium     S/P aortic valve replacement      Priority: Medium     developed perivalve leak and MS, therefore redo surg 2013       S/P CABG (coronary artery bypass graft)      Priority: Medium     with AVR Aguirre-Lad, RA-Rca       Stented coronary artery      Priority: Medium     Mixed hyperlipidemia      Priority: Medium     Diagnosis updated by automated process. " Provider to review and confirm.       PVD (peripheral vascular disease) (H)      Priority: Medium     Abnormal ECG      Priority: Medium     RBBB, 1st degree AVB, Left axis deviation       ACP (advance care planning) 10/09/2015     Priority: Medium     Advance Care Planning 10/9/2015: Receipt of ACP document:  Received: Health Care Directive which was witnessed or notarized on 9-18-15.  Document not previously scanned.  Validation form completed and sent with document to be scanned.  Code Status reflects choices in most recent ACP document.  Confirmed/documented designated decision maker(s).  Added by Aster Rios RN Advance Care Planning Liaison with Willa Khan  Advance Care Planning 10/9/2015: ACP Review and Resources Provided:  Reviewed chart for advance care plan.  Fausto Farr has no plan or code status on file however states presence of ACP document. Copy requested. Confirmed code status reflects current choices pending receipt of document/advance care plan review. Confirmed/documented legally designated decision maker(s). Added by Aster Rios RN Advance Care Planning Liaison with Willa Khan           Urinary retention 12/04/2013     Priority: Medium     Health Care Home 10/24/2013     Priority: Medium     Status:  Closed  Care Coordinator:  Susan Carrillo RN [facility]    See Letters for [facility] Care Plan         MRSA (methicillin resistant Staphylococcus aureus) 10/17/2013     Priority: Medium     Nares 10-16-13       Heart murmur      Priority: Medium     Valvular heart disease 09/16/2013     Priority: Medium     Mitral and Aortic Valve       Vitamin D deficiency disease 08/06/2013     Priority: Medium     Anemia relative at 12.5 in 8-13  08/06/2013     Priority: Medium     Essential hypertension, benign 08/06/2013     Priority: Medium     Hyperlipidemia LDL goal <100 08/06/2013     Priority: Medium     Prostate cancer (H) 08/06/2013     Priority: Medium     Glucose intolerance (impaired glucose  tolerance) 08/06/2013     Priority: Medium     Sciatica of left side since 2000 08/06/2013     Priority: Medium     Somatic dysfunction of pelvic region 10/18/2012     Priority: Medium     Problem list name updated by automated process. Provider to review       Contact dermatitis and other eczema, due to unspecified cause 10/18/2012     Priority: Medium     Ulcerative colitis (H) 10/18/2012     Priority: Medium     Problem list name updated by automated process. Provider to review       Atrial fibrillation (H) 10/18/2012     Priority: Medium     Other specified anemias 10/18/2012     Priority: Medium     Gastric ulcer 10/18/2012     Priority: Medium     Problem list name updated by automated process. Provider to review       Bilateral low back pain with left-sided sciatica 10/18/2012     Priority: Medium     Nonallopathic lesion of lumbar region 10/18/2012     Priority: Medium     Problem list name updated by automated process. Provider to review       Nonallopathic lesion of sacral region 10/18/2012     Priority: Medium     Problem list name updated by automated process. Provider to review       Diaphragmatic hernia 10/18/2012     Priority: Medium     Problem list name updated by automated process. Provider to review       Abdominal pain, epigastric 10/18/2012     Priority: Medium     Malaise and fatigue 10/18/2012     Priority: Medium     Problem list name updated by automated process. Provider to review       Nocturia 10/18/2012     Priority: Medium     Nonallopathic lesion of cervical region 10/18/2012     Priority: Medium     Problem list name updated by automated process. Provider to review       Nonallopathic lesion of thoracic region 10/18/2012     Priority: Medium     Problem list name updated by automated process. Provider to review       Malignant neoplasm of prostate (H) 10/18/2012     Priority: Medium     Abdominal aortic aneurysm (H) 10/18/2012     Priority: Medium     6.5 cm 4/2015 u/s          Nonallopathic lesion of rib cage 10/18/2012     Priority: Medium     Problem list name updated by automated process. Provider to review       Coronary artery disease 10/18/2012     Priority: Medium     AF (atrial fibrillation) (H) 10/18/2012     Priority: Medium     Rotator cuff (capsule) sprain 05/10/2010     Priority: Medium      Past Medical History:   Diagnosis Date     Abdominal pain      Abnormal ECG     RBBB, 1st degree AVB, Left axis deviation     Anemia     currently taking iron     Arrhythmia     pac, pvc     Back pain     since 1980     BPH (benign prostatic hyperplasia)      Bruit      CAD (coronary artery disease)      Cellulitis 10/18/12     Cellulitis 05/2018    GrpB strep LLE cellulitis  negative RACHAEL for veg     Chronic venous insufficiency     bilat lower extremities     Contact dermatitis and other eczema, due to unspecified cause      Diaphragmatic hernia without mention of obstruction or gangrene      Diastolic HF (heart failure) (H)      Gastric ulcer      Glucose intolerance (impaired glucose tolerance)      Heart murmur 9/16/13    valvular heart disease     Hyperlipidemia LDL goal <100 8/6/2013     Hypertension 8/6/13     Lumbago      Malaise and fatigue      Metabolic syndrome      Mobitz (type) I (Wenckebach's) atrioventricular block     and RBBB     Nocturia 10/18/12     Nonallopathic lesion of cervical region      Nonallopathic lesion of lumbar region      Nonallopathic lesion of pelvic region, not elsewhere classified      Nonallopathic lesion of rib cage      Nonallopathic lesion of sacral region      GAGE (obstructive sleep apnea)     CPAP     Paroxysmal atrial fibrillation (H) 10/18/12     Prostate cancer (H) 2008    radiation seed, XRT      PVD (peripheral vascular disease) (H)      RBBB     1st degree AVB, RBBB, LAHB     Rotator cuff strain     and sprain     S/P AAA repair      S/P aortic valve replacement 2006    developed perivalve leak and MS, therefore redo surg 2013     S/P CABG  (coronary artery bypass graft) 2006    Lima-Lad, RA-Rca     Sciatica of left side     since 2000     Sepsis due to group B Streptococcus (H) 5/19/2018     Ulcerative colitis (H)      Varicose veins of bilateral lower extremities with other complications     s/p RLE vein stripping     Vitamin D deficiency      Past Surgical History:   Procedure Laterality Date     AORTIC VALVE REPLACEMENT  1/3/06    redo AVR SJM 21mm and SJM MVR 27mm in 2013SJM 21([id number]):AVR, SJM 27 :MVR-     APPLY WOUND VAC N/A 11/12/2019    Procedure: APPLICATION, WOUND VAC;  Surgeon: Sara Martinez MD;  Location:  OR     ARTHROPLASTY KNEE      right knee     ARTHROPLASTY KNEE Right 7/22/2019    Procedure: Right total knee arthroplasty;  Surgeon: Prasanth Jensen MD;  Location:  OR     BACK SURGERY  Oct 2015    Fusion L4-5, laminectomy L2, L3     BYPASS GRAFT ARTERY CORONARY  10/2013    reimplantation of radial artery graft to RCA     C CABG, VEIN, TWO  1/3/06    Left radial to RCA, LIMA to LAD (RA to RCA reimplanted at time of 2013 surg)     CARDIAC CATHERIZATION  11/2005    Stent placed to RCA     CARDIAC CATHERIZATION  04/2013    Occluded RCA, patent LIMA to LAD and radial graft to PDA     CARPAL TUNNEL RELEASE RT/LT  1994     COLONOSCOPY  8-22-11     CYSTOSCOPY FLEXIBLE  10/16/2013    Procedure: CYSTOSCOPY FLEXIBLE;  FLEXIBLE CYSTOSCOPY / DILATION OF URETHRA / INSERTION OF LESLIE;  Surgeon: Cooper Wallace MD;  Location:  OR     ENDOVASCULAR REPAIR, INFRARENAL ABDOMINAL AORTIC ANEURYSM/DISSECTION; MODULAR BIFURCATED PROSTHESIS  2006    AAA repair endovascular     ENT SURGERY       EP ABLATION ATRIAL FLUTTER N/A 4/22/2019    Procedure: EP Ablation Atrial Flutter;  Surgeon: Jessy Vasquez MD;  Location:  HE[facility]ARD[facility] CATH LAB     GENITOURINARY SURGERY  6/16/08    radioactive seeding     GRAFT FLAP PEDICLE EXTREMITY (LOCATION) Right 11/12/2019    Procedure: SURGICAL PROCUREMENT, FLAP, PEDICLE,  EXTREMITY;  Surgeon: Sara Martinez MD;  Location:  OR     GRAFT SKIN SPLIT THICKNESS FROM EXTREMITY Right 11/12/2019    Procedure: RIGHT GASTRONEMIUS FLAP TO RIGHT KNEE, SPLIT THICKNESS SKIN GRAFT FROM RIGHT THIGH TO RIGHT KNEE, SURGICAL PROCUREMENT, FLAP, PEDICLE, EXTREMITY, WOUND VAC PLACEMENT;  Surgeon: Sara Martinez MD;  Location:  OR     HEAD & NECK SURGERY  1997    vocal cord polypectomy     INCISION AND DRAINAGE KNEE, COMBINED Right 8/29/2019    Procedure: INCISION AND DRAINAGE, KNEE W/ Secondary Wound Closure;  Surgeon: Prasanth Jensen MD;  Location:  OR     IRRIGATION AND DEBRIDEMENT KNEE, PLACE ANTIBIOTIC CEMENT BEADS / SPACE Right 2/12/2020    Procedure: 1. Irrigation and debridement of wound breakdown, right total knee arthroplasty.  2. Irrigation and debridement of right total knee with placement of antibiotic-impregnated (vancomycin) absorbable antibiotic beads.;  Surgeon: Prasanth Jensen MD;  Location:  OR     IRRIGATION AND DEBRIDEMENT LOWER EXTREMITY, COMBINED Right 12/8/2019    Procedure: DEBRIDEMENT OF RIGHT CALF AND WOUND CLOSURE.;  Surgeon: Sara Martinez MD;  Location:  OR     KNEE SURGERY  2001 Right knee arthroscopy     OPTICAL TRACKING SYSTEM FUSION SPINE POSTERIOR LUMBAR THREE+ LEVELS N/A 10/29/2015    Procedure: OPTICAL TRACKING SYSTEM FUSION SPINE POSTERIOR LUMBAR THREE+ LEVELS;  Surgeon: Walt Garcia MD;  Location:  OR     PROSTATE SURGERY      radioactive seeding 6/16/08     REPAIR ANEURYSM ABDOMINAL AORTA  06/08     REPAIR VALVE MITRAL  10/16/2013    SJM 21([id number]):AVR, SJM 27 :MVRProcedure: REPAIR VALVE MITRAL;  REDO STERNOTOMY/REDO AORTIC VALVE REPLACEMENT/ MITRAL VALVE REPLACEMENT/REIMPLANTATION OF RIGHT CORONARY ARTERY BYPASS WITH RACHAEL ( ON PUMP);  Surgeon: Viet Singh MD;  Location:  OR     REPLACE VALVE AORTIC  10/16/2013    Procedure: REPLACE VALVE AORTIC;;  Surgeon: Viet Singh MD;  Location:  [facility] OR     SURGERY GENERAL IP CONSULT  05/2008 Excision aneurysm abdominal aorta     SURGERY GENERAL IP CONSULT  1997 Vocal cord polypectomy     VASCULAR SURGERY  1968, 1993     varicose vein stripping     Current Outpatient Medications   Medication Sig Dispense Refill     acetaminophen (TYLENOL) 500 MG tablet Take 500-1,000 mg by mouth every 6 hours as needed for pain       amoxicillin-clavulanate (AUGMENTIN) 875-125 MG tablet Take 1 tablet by mouth daily       atropine 1 % ophthalmic solution START 3 DAYS BEFORE SURGERY. INSTILL 1 DROP IN OPERATED EYE BID       betamethasone valerate (VALISONE) 0.1 % external lotion Apply topically 2 times daily Apply topically to scalp twice daily PRN 60 mL 3     cholecalciferol 25 MCG (1000 UT) TABS Take 1,000 Units by mouth daily       ezetimibe (ZETIA) 10 MG tablet Take 1 tablet (10 mg) by mouth daily 90 tablet 3     ferrous sulfate (FEROSUL) 325 (65 Fe) MG tablet Take 325 mg by mouth daily (with breakfast)       fluocinonide (LIDEX) 0.05 % external ointment Apply topically 2 times daily as needed       furosemide (LASIX) 40 MG tablet Take 0.5 tablets (20 mg) by mouth daily 45 tablet 3     glucosamine-chondroitin 500-400 MG CAPS per capsule Take 1 capsule by mouth 2 times daily       mesalamine (ASACOL HD) 800 MG EC tablet Take 1 tablet (800 mg) by mouth 2 times daily 180 tablet 3     metoprolol tartrate (LOPRESSOR) 25 MG tablet Take 12.5 mg ( 1/2 tablet) twice daily 90 tablet 3     prednisoLONE acetate (PRED FORTE) 1 % ophthalmic suspension        rosuvastatin (CRESTOR) 40 MG tablet Take 1 tablet (40 mg) by mouth daily 90 tablet 3     tamsulosin (FLOMAX) 0.4 MG capsule TAKE 1 CAPSULE BY MOUTH EVERY DAY. 90 capsule 3     warfarin ANTICOAGULANT (COUMADIN) 10 MG tablet Take 20 mg every Wednesday and 25 mg all other days or as directed by the INR clinic 220 tablet 0     warfarin ANTICOAGULANT (COUMADIN) 5 MG tablet Take 12.5mg every Mon,Wed & Fri / Take 10mg all other days OR as  instructed by INR clinic 186 tablet 0       Allergies   Allergen Reactions     Bees Anaphylaxis        Social History     Tobacco Use     Smoking status: Former Smoker     Packs/day: 1.00     Years: 40.00     Pack years: 40.00     Start date: 4/15/1962     Quit date: 10/23/2002     Years since quittin.1     Smokeless tobacco: Never Used   Substance Use Topics     Alcohol use: Yes     Frequency: 2-4 times a month     Drinks per session: 1 or 2     Comment: a couple beers per week (socially)     Family History   Problem Relation Age of Onset     No Known Problems Mother      Coronary Artery Disease Father         CABG     Heart Disease Father         Pacemaker     No Known Problems Brother      No Known Problems Sister      No Known Problems Son      Other Cancer Daughter      No Known Problems Daughter      Heart Disease Brother      Other - See Comments Grandchild      History   Drug Use No         Objective     /64 (BP Location: Right arm, Patient Position: Chair, Cuff Size: Adult Large)   Pulse 83   Temp 97.5  F (36.4  C) (Oral)   Wt 97.6 kg (215 lb 3.2 oz)   BMI 35.27 kg/m      Physical Exam  General: Vital signs reviewed.  Patient is in no acute appearing distress.  Breathing appears nonlabored.  Patient is alert and oriented ×3.  He can get up and down from exam room chair and table without assistance.    ENT: Ear exam shows bilateral tympanic membranes to be clear without injection, nasal turbinates show no injection or edema, no pharyngeal injection or exudate.    Neck: supple with no adenoapthy, palpable abnormal masses, or thyroid abnormality.    Eyes: No scleral, lid, or periorbital injection or edema noted.  No eye mattering noted.  Corneas are cloudy bilaterally due to cataracts. Pupils are equal round and reactive to light with normal consensual eye movement.    Heart: Heart rate is regular.  He does have a history of atrial fibrillation though this was not noted on physical exam.  He has  distinct, typical closure sounds of his artificial heart valves with no murmurs noted.    Lungs: Lungs are clear to auscultation with good airflow bilaterally.    Abdomen:  Abdomen is soft, nontender.  No palpable abnormal masses or organomegaly.  Bowel sounds are normal.    Back: No areas of tenderness.    Skin: Warm and dry, with no rash or abnormal lesions noted.    Extremities: Patient has slight ankle edema noted bilaterally.  No joint edema or restricted range of motion noted.    Neuro: No acute focal deficits  Or other abnormalities noted.    Psych: Patient is very pleasant, making good eye contact, with clear and fluent speech.  Answers questions appropriately.    Recent Labs   Lab Test 11/17/20  0801 10/27/20  0803 06/15/20  1020 06/15/20  1020 06/01/20  0941 06/01/20  0941 03/23/20  1230 03/23/20  1230 02/14/20  0530 02/14/20  0530   HGB  --   --   --  13.0*  --   --   --  12.2*   < > 10.2*   PLT  --   --   --  316  --   --   --  349   < >  --    INR 2.60* 3.90*   < > 2.30*   < > 4.40*   < >  --    < > 1.46*   NA  --   --   --   --   --  136  --   --   --  140   POTASSIUM  --   --   --   --   --  4.0  --   --   --  3.9   CR  --   --   --   --   --  0.73  --  0.83   < > 0.84    < > = values in this interval not displayed.        Diagnostics:  Recent Results (from the past 168 hour(s))   INR    Collection Time: 12/01/20  4:40 PM   Result Value Ref Range    INR 3.80 (H) 0.86 - 1.14   Capillary Blood Collection    Collection Time: 12/01/20  4:40 PM   Result Value Ref Range    Capillary Blood Collection Capillary collection performed       No EKG required for low risk surgery (cataract, skin procedure, breast biopsy, etc).    Revised Cardiac Risk Index (RCRI):  The patient has the following serious cardiovascular risks for perioperative complications:   - No serious cardiac risks = 0 points--no general anesthesia will be used.    RCRI Interpretation: 0 points: Class I (very low risk - 0.4% complication  rate)       Assessment & Plan   The proposed surgical procedure is considered LOW risk.    Preop general physical exam  Patient will not need to stop his warfarin therapy for cataract surgery.  - INR    Other cataract of both eyes      S/P mitral valve replacement    - INR    S/P aortic valve replacement    - INR    Risks and Recommendations:  The patient has the following additional risks and recommendations for perioperative complications:   - No identified additional risk factors other than previously addressed    Medication Instructions:  Take your normal prescribed morning medications on day of surgery with a small amount of water. Do not take ibuprofen, naproxen, or aspirin before surgery. The INR nurse should work with you on your warfarin management. You should continue this therapy.     RECOMMENDATION:  APPROVAL GIVEN to proceed with proposed procedure, without further diagnostic evaluation.    Patient Instructions     Take your normal prescribed morning medications on day of surgery with a small amount of water. Do not take ibuprofen, naproxen, or aspirin before surgery. The INR nurse should work with you on your warfarin management. You should continue this therapy.     Patient Education   Preparing for Your Surgery  Getting started  A surgery nurse will call you to review your health history and instructions. They will give you an arrival time based on your scheduled surgery time.  Please be ready to share the following:    Your doctor's clinic name and phone number    Your medical, surgical and anesthesia history    A list of allergies and sensitivities    A list of medicines, including herbal treatments and over-the-counter drugs    Whether the patient has a legal guardian (ask how to send us the papers in advance)  If your child is having surgery, please ask for a copy of Preparing for Your Child's Surgery.    Preparing for surgery    Within 30 days of surgery: Have an exam at your family clinic  "(primary care clinic), or go to a pre-operative clinic. This exam is called a \"History and Physical,\" or H&P.    At your H&P exam, talk to your care team about all medicines you take. If you need to stop any medicines before surgery, ask when to start taking them again.  ? We do this for your safety. Many medicines can make you bleed too much during surgery. Some change how well surgery (anesthesia) drugs work.    Call your insurance company to see what it will and won't pay for. Ask if they need to pre-approve the surgery. (If no insurance, call [phone].)    Call your surgeon's clinic if there's any change in your health. This includes signs of a cold or flu (sore throat, runny nose, cough, rash, fever). It also includes a scrape or scratch near the surgery site.    If you have questions on the day of surgery, call your surgery center.  Eating and drinking guidelines  For your safety: Unless your surgeon tells you otherwise, follow the guidelines below.    Eat and drink as usual until 8 hours before surgery. After that, no food or milk.    Drink clear liquids until 2 hours before surgery. These are liquids you can see through, like water, Gatorade and Propel Water. You may also have black coffee and tea (no cream or milk).    Nothing by mouth within 2 hours of surgery. This includes gum, candy and breath mints.    Stop alcohol the midnight before surgery.    If your family clinic tells you to take medicine on the morning of surgery, it's okay to take it with a sip of water.  Preventing infection    Shower or bathe the night before and morning of your surgery. Follow the instructions your clinic gave you. (If no instructions, use regular soap.)    Don't shave or clip hair near your surgery site. This can lead to skin infection.    Don't smoke the morning of surgery. Smoking increases the risk of infection. You may chew nicotine gum up to 2 hours before surgery. A nicotine patch is okay.  ? Note: Some surgeries " require you to completely quit smoking and nicotine. Check with your surgeon.    Your care team will make every effort to keep you safe from infection. We will:  ? Clean our hands often with soap and water (or an alcohol-based hand rub).  ? Clean the skin at your surgery site with a special soap that kills germs. We'll also remove hair from the site as needed.  ? Wear special hair covers, masks, gowns and gloves during surgery.  ? Give antibiotic medicine, if prescribed. Not all surgeries need antibiotics.  What to bring on the day of surgery    Photo ID and insurance card    Copy of your health care directive, if you have one    Glasses and hearing aides (bring cases)  ? You can't wear contacts during surgery    Inhaler and eye drops, if you use them (tell us about these when you arrive)    CPAP machine or breathing device, if you use them    A few personal items, if spending the night    If you have . . .  ? A pacemaker or ICD (cardiac defibrillator): Bring the ID card.  ? An implanted stimulator: Bring the remote control.  ? A legal guardian: Bring a copy of the certified (court-stamped) guardianship papers.  Please remove any jewelry, including body piercings. Leave jewelry and other valuables at home.  If you're going home the day of surgery  Important: If you don't follow the rules below, we must cancel your surgery.     Arrange for someone to drive you home after surgery. You may not drive, take a taxi or take public transportation by yourself (unless you'll have local anesthesia only).    Arrange for a responsible adult to stay with you overnight. If you don't, we may keep you in the hospital overnight, and you may need to pay the costs yourself.  Questions?   If you have any questions for your care team, list them here:  _________________________________________________________________________________________________________________________________________________________________________________________________________________________________________________________________________________________________________________________  For informational purposes only. Not to replace the advice of your health care provider. Copyright   7479-4305 [facility]. All rights reserved. Clinically reviewed by Gabby Mcmahon MD. [employer] [id number] - REV 07/19.           Signed Electronically by: Best Fountain DO    Copy of this evaluation report is provided to requesting physician.    [facility] Guidelines    Revised Cardiac Risk Index

## 2020-12-02 NOTE — PROGRESS NOTES
Anticoagulation Management    Unable to reach Ralph today.    Today's INR result of 3.8 is supratherapeutic (goal INR of 2.5-3.5).  Result received from: Clinic Lab    Follow up required to confirm warfarin dose taken and assess for changes    Left message to take reduced dose of warfarin, 5  mg tonight.      Anticoagulation clinic to follow up    Meryl Aparicio RN

## 2020-12-02 NOTE — PROGRESS NOTES
ANTICOAGULATION MANAGEMENT     Patient Name:  Fausto Farr  Date:  12/2/2020    ASSESSMENT /SUBJECTIVE:    Today's INR result of 3.8 is supratherapeutic. Goal INR of 2.5-3.5      Warfarin dose taken: Warfarin taken as instructed    Diet: No new diet changes affecting INR    Medication changes/ interactions: No new medications/supplements affecting INR    Previous INR: Therapeutic     S/S of bleeding or thromboembolism: No    New injury or illness: No    Upcoming surgery, procedure or cardioversion: Yes: cataract surgery on 12/6 & 12/9l per patient MD does not need to have inr lower for procedure      Additional findings: None      PLAN:    Telephone call with Fausto regarding INR result and instructed:     Warfarin Dosing Instructions: patient took reduced dose of 5mg yesterday and will return to regular dosing     Instructed patient to follow up no later than: 2 weeks  Lab visit scheduled    Education provided: Contact the anticoagulation clinic with any changes, questions or concerns at #[phone]       Ralph  verbalizes understanding and agrees to warfarin dosing plan.    Instructed to call the Anticoagulation Clinic for any changes, questions or concerns. (#[phone])        Farheen Mcdonald RN      OBJECTIVE:  Recent labs: (last 7 days)     12/01/20  1640   INR 3.80*         No question data found.  Anticoagulation Summary  As of 12/1/2020    INR goal:  2.5-3.5   TTR:  54.0 % (11.6 mo)   INR used for dosing:  3.80 (12/1/2020)   Warfarin maintenance plan:  7.5 mg (5 mg x 1.5) every Tue, Thu, Sat; 5 mg (10 mg x 0.5) all other days   Full warfarin instructions:  12/1: 5 mg; Otherwise 7.5 mg every Tue, Thu, Sat; 5 mg all other days   Weekly warfarin total:  42.5 mg   Plan last modified:  Caryn Campos RN (10/27/2020)   Next INR check:  12/14/2020   Priority:  Maintenance   Target end date:  Indefinite    Indications    AF (atrial fibrillation) (H) [I48.91]  S/P mitral valve replacement [Z95.2]  Long  term current use of anticoagulant therapy [Z79.01]  Persistent atrial fibrillation (H) [I48.19]  S/P aortic valve replacement [Z95.2]             Anticoagulation Episode Summary     INR check location:      Preferred lab:  [facility]    Send INR reminders to:  SHANNA DOWELL    Comments:  Rifampin stopped early June 2020 - will need less warfarin with time.          Anticoagulation Care Providers     Provider Role Specialty Phone number    Jodi Flower Mai, MD Referring Internal Medicine - Pediatrics [phone]         Farheen Lopez RN, BSN, PHN

## 2020-12-14 ENCOUNTER — ANTICOAGULATION THERAPY VISIT (OUTPATIENT)
Dept: NURSING | Facility: CLINIC | Age: 80
End: 2020-12-14

## 2020-12-14 ENCOUNTER — TELEPHONE (OUTPATIENT)
Dept: PEDIATRICS | Facility: CLINIC | Age: 80
End: 2020-12-14

## 2020-12-14 DIAGNOSIS — Z95.2 S/P AORTIC VALVE REPLACEMENT: ICD-10-CM

## 2020-12-14 DIAGNOSIS — Z79.01 LONG TERM CURRENT USE OF ANTICOAGULANT THERAPY: ICD-10-CM

## 2020-12-14 DIAGNOSIS — I48.91 AF (ATRIAL FIBRILLATION) (H): ICD-10-CM

## 2020-12-14 DIAGNOSIS — Z95.2 S/P MITRAL VALVE REPLACEMENT: ICD-10-CM

## 2020-12-14 DIAGNOSIS — I48.0 PAROXYSMAL ATRIAL FIBRILLATION (H): ICD-10-CM

## 2020-12-14 DIAGNOSIS — I48.19 PERSISTENT ATRIAL FIBRILLATION (H): ICD-10-CM

## 2020-12-14 LAB
CAPILLARY BLOOD COLLECTION: NORMAL
INR PPP: 4.2 (ref 0.86–1.14)

## 2020-12-14 PROCEDURE — 85610 PROTHROMBIN TIME: CPT | Performed by: INTERNAL MEDICINE

## 2020-12-14 PROCEDURE — 36416 COLLJ CAPILLARY BLOOD SPEC: CPT | Performed by: INTERNAL MEDICINE

## 2020-12-14 RX ORDER — ACETAMINOPHEN 500 MG
500-1000 TABLET ORAL EVERY 6 HOURS PRN
Status: CANCELLED | OUTPATIENT
Start: 2020-12-14

## 2020-12-14 NOTE — TELEPHONE ENCOUNTER
Patient called to change preferred pharmacy to [facility] [employer] on [address] in [place].     Preferred pharmacy changed in [facility] as requested.     Juan Carlos Parmar RN on 12/14/2020 at 3:21 PM

## 2020-12-14 NOTE — PROGRESS NOTES
ANTICOAGULATION MANAGEMENT     Patient Name:  Fausto Farr  Date:  12/14/2020    ASSESSMENT /SUBJECTIVE:    Today's INR result of 4.2 is supratherapeutic. Goal INR of 2.5-3.5      Warfarin dose taken: Warfarin taken as instructed    Diet: No new diet changes affecting INR    Medication changes/ interactions: No interaction expected between eye gtts for cataract surgery and warfarin    Previous INR: Supratherapeutic     S/S of bleeding or thromboembolism: No    New injury or illness: No    Upcoming surgery, procedure or cardioversion: Yes: having cataract surgery on opposite eye next week     Additional findings: None      PLAN:    Telephone call with Fausto regarding INR result and instructed:     Warfarin Dosing Instructions: 2.5mg then change your warfarin dose to 7.5mg Tues/Sat and 5mg AOD    Instructed patient to follow up no later than: 2 weeks  Lab visit scheduled    Education provided: Target INR goal and significance of current INR result, Monitoring for bleeding signs and symptoms and Monitoring for clotting signs and symptoms      Ralph verbalizes understanding and agrees to warfarin dosing plan.    Instructed to call the Anticoagulation Clinic for any changes, questions or concerns. (#[phone])        Krysta Arce RN      OBJECTIVE:  Recent labs: (last 7 days)     12/14/20  1026   INR 4.20*         INR assessment SUPRA    Recheck INR In: 2 WEEKS    INR Location Clinic      Anticoagulation Summary  As of 12/14/2020    INR goal:  2.5-3.5   TTR:  52.2 % (11.8 mo)   INR used for dosing:  No new INR was available at the time of this encounter.   Warfarin maintenance plan:  7.5 mg (5 mg x 1.5) every Tue, Sat; 5 mg (10 mg x 0.5) all other days   Full warfarin instructions:  12/14: 2.5 mg; Otherwise 7.5 mg every Tue, Sat; 5 mg all other days   Weekly warfarin total:  40 mg   Plan last modified:  Krysta Arce RN (12/14/2020)   Next INR check:  12/28/2020   Priority:  Maintenance   Target end  date:  Indefinite    Indications    AF (atrial fibrillation) (H) [I48.91]  S/P mitral valve replacement [Z95.2]  Long term current use of anticoagulant therapy [Z79.01]  Persistent atrial fibrillation (H) [I48.19]  S/P aortic valve replacement [Z95.2]             Anticoagulation Episode Summary     INR check location:      Preferred lab:  [facility]    Send INR reminders to:  SHANNA DOWELL    Comments:  Rifampin stopped early June 2020 - will need less warfarin with time.          Anticoagulation Care Providers     Provider Role Specialty Phone number    Jodi Flower Mai, MD Referring Internal Medicine - Pediatrics [phone]

## 2020-12-28 ENCOUNTER — ANTICOAGULATION THERAPY VISIT (OUTPATIENT)
Dept: NURSING | Facility: CLINIC | Age: 80
End: 2020-12-28

## 2020-12-28 DIAGNOSIS — Z95.2 S/P MITRAL VALVE REPLACEMENT: ICD-10-CM

## 2020-12-28 DIAGNOSIS — Z79.01 LONG TERM CURRENT USE OF ANTICOAGULANT THERAPY: ICD-10-CM

## 2020-12-28 DIAGNOSIS — Z95.2 S/P AORTIC VALVE REPLACEMENT: ICD-10-CM

## 2020-12-28 DIAGNOSIS — I48.91 AF (ATRIAL FIBRILLATION) (H): ICD-10-CM

## 2020-12-28 DIAGNOSIS — I48.0 PAROXYSMAL ATRIAL FIBRILLATION (H): ICD-10-CM

## 2020-12-28 DIAGNOSIS — I48.19 PERSISTENT ATRIAL FIBRILLATION (H): ICD-10-CM

## 2020-12-28 LAB
CAPILLARY BLOOD COLLECTION: NORMAL
INR PPP: 3.6 (ref 0.86–1.14)

## 2020-12-28 PROCEDURE — 85610 PROTHROMBIN TIME: CPT | Performed by: INTERNAL MEDICINE

## 2020-12-28 PROCEDURE — 99207 PR NO CHARGE NURSE ONLY: CPT

## 2020-12-28 PROCEDURE — 36416 COLLJ CAPILLARY BLOOD SPEC: CPT | Performed by: INTERNAL MEDICINE

## 2020-12-28 NOTE — PROGRESS NOTES
ANTICOAGULATION FOLLOW-UP     Patient Name:  Fausto Farr  Date:  12/28/2020  Contact Type:  Telephone    SUBJECTIVE:  Patient Findings     Positives:  Change in health (Cataract surgery 12/9/2020 and 12/16/2020.), Change in diet/appetite (Ate less green vegetables over the holiday.)    Comments:  The patient was assessed for   medication,   missed or extra doses,   bruising or bleeding,   with no problem findings.  No questions or concerns.  Reviewed previous warfarin dosing with patient.  He took warfarin as instructed.    INR is supratherapeutic today at 3.6.   Patient will take 1/2 dose today which will decrease weekly total   by 6.3%, then resume maintenance dose.   Will follow up in 2 weeks.            Clinical Outcomes     Comments:  The patient was assessed for   medication,   missed or extra doses,   bruising or bleeding,   with no problem findings.  No questions or concerns.  Reviewed previous warfarin dosing with patient.  He took warfarin as instructed.    INR is supratherapeutic today at 3.6.   Patient will take 1/2 dose today which will decrease weekly total   by 6.3%, then resume maintenance dose.   Will follow up in 2 weeks.               OBJECTIVE    Recent labs: (last 7 days)     12/28/20  0953   INR 3.60*       ASSESSMENT / PLAN  INR assessment SUPRA    Recheck INR In: 2 WEEKS    INR Location Clinic lab     Anticoagulation Summary  As of 12/28/2020    INR goal:  2.5-3.5   TTR:  50.6 % (11.8 mo)   INR used for dosing:  3.60 (12/28/2020)   Warfarin maintenance plan:  7.5 mg (5 mg x 1.5) every Tue, Sat; 5 mg (10 mg x 0.5) all other days   Full warfarin instructions:  12/28: 2.5 mg; Otherwise 7.5 mg every Tue, Sat; 5 mg all other days   Weekly warfarin total:  40 mg   Plan last modified:  Krysta Arce RN (12/14/2020)   Next INR check:  1/11/2021   Priority:  Maintenance   Target end date:  Indefinite    Indications    AF (atrial fibrillation) (H) [I48.91]  S/P mitral valve replacement  [Z95.2]  Long term current use of anticoagulant therapy [Z79.01]  Persistent atrial fibrillation (H) [I48.19]  S/P aortic valve replacement [Z95.2]             Anticoagulation Episode Summary     INR check location:      Preferred lab:  [facility]    Send INR reminders to:  SHANNA DOWELL    Comments:  Rifampin stopped early June 2020 - will need less warfarin with time.          Anticoagulation Care Providers     Provider Role Specialty Phone number    Jodi Flower Mai, MD Referring Internal Medicine - Pediatrics [phone]            See the Encounter Report to view Anticoagulation Flowsheet and Dosing Calendar (Go to Encounters tab in chart review, and find the Anticoagulation Therapy Visit)    Dosage adjustment made based on physician directed care plan.      Caryn Campos RN

## 2021-01-12 ENCOUNTER — ANTICOAGULATION THERAPY VISIT (OUTPATIENT)
Dept: ANTICOAGULATION | Facility: CLINIC | Age: 81
End: 2021-01-12

## 2021-01-12 DIAGNOSIS — I48.0 PAROXYSMAL ATRIAL FIBRILLATION (H): ICD-10-CM

## 2021-01-12 DIAGNOSIS — Z79.01 LONG TERM CURRENT USE OF ANTICOAGULANT THERAPY: ICD-10-CM

## 2021-01-12 DIAGNOSIS — Z95.2 S/P MITRAL VALVE REPLACEMENT: ICD-10-CM

## 2021-01-12 DIAGNOSIS — I48.91 AF (ATRIAL FIBRILLATION) (H): ICD-10-CM

## 2021-01-12 DIAGNOSIS — I48.19 PERSISTENT ATRIAL FIBRILLATION (H): ICD-10-CM

## 2021-01-12 DIAGNOSIS — Z95.2 S/P AORTIC VALVE REPLACEMENT: ICD-10-CM

## 2021-01-12 LAB
CAPILLARY BLOOD COLLECTION: NORMAL
INR PPP: 2.9 (ref 0.86–1.14)

## 2021-01-12 PROCEDURE — 99207 PR NO CHARGE NURSE ONLY: CPT

## 2021-01-12 PROCEDURE — 85610 PROTHROMBIN TIME: CPT | Performed by: INTERNAL MEDICINE

## 2021-01-12 PROCEDURE — 36416 COLLJ CAPILLARY BLOOD SPEC: CPT | Performed by: INTERNAL MEDICINE

## 2021-01-12 NOTE — PROGRESS NOTES
ANTICOAGULATION FOLLOW-UP CLINIC VISIT    Patient Name:  Fausto Farr  Date:  2021  Contact Type:  Telephone/ discussed with patient    SUBJECTIVE:  Patient Findings     Comments:  Patient states that he has been taking 7.5 mg on Sun, , Th, SA and 5 mg on Mon, Wed, Fri at least since the last INR check.  Adjusted maintenance dose to reflect this as INR is in range and I do not want to change what patient has been taking.  The patient was assessed for diet, medication, and activity level changes, missed or extra doses, bruising or bleeding, with no problem findings.          Clinical Outcomes     Negatives:  Major bleeding event, Thromboembolic event, Anticoagulation-related hospital admission, Anticoagulation-related ED visit, Anticoagulation-related fatality    Comments:  Patient states that he has been taking 7.5 mg on Sun, , Th, SA and 5 mg on Mon, Wed, Fri at least since the last INR check.  Adjusted maintenance dose to reflect this as INR is in range and I do not want to change what patient has been taking.  The patient was assessed for diet, medication, and activity level changes, missed or extra doses, bruising or bleeding, with no problem findings.             OBJECTIVE    Recent labs: (last 7 days)     21  0941   INR 2.90*       ASSESSMENT / PLAN  INR assessment THER    Recheck INR In: 3 WEEKS    INR Location Clinic      Anticoagulation Summary  As of 2021    INR goal:  2.5-3.5   TTR:  50.6 % (11.8 mo)   INR used for dosin.90 (2021)   Warfarin maintenance plan:  5 mg (10 mg x 0.5) every Mon, Wed, Fri; 7.5 mg (5 mg x 1.5) all other days   Full warfarin instructions:  5 mg every Mon, Wed, Fri; 7.5 mg all other days   Weekly warfarin total:  45 mg   Plan last modified:  Ruthann Sanders RN (2021)   Next INR check:  2021   Priority:  Maintenance   Target end date:  Indefinite    Indications    AF (atrial fibrillation) (H) [I48.91]  S/P mitral valve replacement  [Z95.2]  Long term current use of anticoagulant therapy [Z79.01]  Persistent atrial fibrillation (H) [I48.19]  S/P aortic valve replacement [Z95.2]             Anticoagulation Episode Summary     INR check location:      Preferred lab:  [facility]    Send INR reminders to:  SHANNA DOWELL    Comments:  Rifampin stopped early June 2020 - will need less warfarin with time.          Anticoagulation Care Providers     Provider Role Specialty Phone number    Jodi Flower Mai, MD Referring Internal Medicine - Pediatrics [phone]            See the Encounter Report to view Anticoagulation Flowsheet and Dosing Calendar (Go to Encounters tab in chart review, and find the Anticoagulation Therapy Visit)    Dosage adjustment made based on physician directed care plan.    Ruthann Sanders RN

## 2021-01-14 NOTE — PROGRESS NOTES
Ralph is a 80 year old who is being evaluated via a billable telephone visit.      What phone number would you like to be contacted at? [phone]  How would you like to obtain your AVS? Mail a copy   [facility]   Outpatient Sleep Medicine Follow-up Visit  January 14, 2021    Name: Fausto Farr MRN# [id number]   Age: 80 year old YOB: 1940     Date of Consultation: January 14, 2021  Consultation is requested by: No referring provider defined for this encounter.  Primary care provider: Jodi Flower Mai           Assessment and Plan:     Sleep Diagnoses:      Moderate obstructive sleep apnea with hypoxemia effectively treated with CPAP with excellent adherence    Interruption in CPAP adherence due to surgical procedures and care    Status post coronary artery bypass graft, mitral valve replacement; atrial fibrillation     Summary Recommendations:       Continue CPAP    RTC 1 year or earlier if you have difficulties initiating or maintaining sleep or recurrence of snoring     Summary Counseling: Counseled in rationale for treating moderate sleep apnea with hypoxemia in the setting of cardiovascular disease, use of CPAP care of equipment and replacement of masks.             History of Present Illness:      Fausto Farr is a 80 year old male with a history of moderate obstructive sleep apnea with hypoxemia consistently effectively treated with CPAP without resurgence of symptoms of sleep disruption and daytime fatigue.  His condition occurs in the setting of coronary artery disease, atrial fibrillation and aortic and mitral valve replacement.  His previous pain syndrome is substantially resolved and he is not having difficulty sleeping.     PREVIOUS SLEEP STUDIES:  Date: August 9, 2016  AHI: 18.8 was 11.3 minutes of hypoxemia      SCALES:        CURRENT THERAPY  Subjective:  No difficulty initiating or maintaining sleep; no observed snoring; no  difficulty with interface    Objective:  CPAP Compliance Targets:   >70% days > 4 hours AHI < 5   30 days ending January 14, 2021   ResMed    Medium nasal pillow mask  Auto-PAP 6.0 - 16.0 cmH2O 30 day usage data:    93% of days with > 4 hours of use. 1/30 days with no use.   Average use 406 minutes per day.   95%ile Leak 33.84 L/min.   CPAP 95% pressure 10.3 cm.   AHI 1.09 events per hour.                   Problem list:     Patient Active Problem List   Diagnosis     Rotator cuff (capsule) sprain     Somatic dysfunction of pelvic region     Contact dermatitis and other eczema, due to unspecified cause     Ulcerative colitis (H)     Atrial fibrillation (H)     Other specified anemias     Gastric ulcer     Bilateral low back pain with left-sided sciatica     Nonallopathic lesion of lumbar region     Nonallopathic lesion of sacral region     Diaphragmatic hernia     Abdominal pain, epigastric     Malaise and fatigue     Nocturia     Nonallopathic lesion of cervical region     Nonallopathic lesion of thoracic region     Malignant neoplasm of prostate (H)     Abdominal aortic aneurysm (H)     Nonallopathic lesion of rib cage     Vitamin D deficiency disease     Anemia relative at 12.5 in 8-13      Essential hypertension, benign     Hyperlipidemia LDL goal <100     Prostate cancer (H)     Glucose intolerance (impaired glucose tolerance)     Sciatica of left side since 2000     Valvular heart disease     Heart murmur     MRSA (methicillin resistant Staphylococcus aureus)     Health Care Home     Urinary retention     Coronary artery disease     ACP (advance care planning)     AF (atrial fibrillation) (H)     S/P aortic valve replacement     S/P CABG (coronary artery bypass graft)     Stented coronary artery     Mixed hyperlipidemia     PVD (peripheral vascular disease) (H)     Abnormal ECG     Personal history of tobacco use, presenting hazards to health     Spinal stenosis of lumbar region without neurogenic claudication      S/P mitral valve replacement     RBBB with left anterior fascicular block     S/P lumbar spinal fusion     Long term current use of anticoagulant therapy     Chronic systolic congestive heart failure (H)     GAGE (obstructive sleep apnea)     Sepsis due to group B Streptococcus (H)     Typical atrial flutter (H)     Obesity (BMI 35.0-39.9) with comorbidity (H)     Knee pain, chronic     Total knee replacement status     Open wound of right knee     Septic joint (H)     Wound dehiscence     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, septic arthritis of unspecified location (H)     Persistent atrial fibrillation (H)               Medications:     Current Outpatient Medications   Medication Sig     acetaminophen (TYLENOL) 500 MG tablet Take 500-1,000 mg by mouth every 6 hours as needed for pain     amoxicillin-clavulanate (AUGMENTIN) 875-125 MG tablet Take 1 tablet by mouth daily     atropine 1 % ophthalmic solution START 3 DAYS BEFORE SURGERY. INSTILL 1 DROP IN OPERATED EYE BID     betamethasone valerate (VALISONE) 0.1 % external lotion Apply topically 2 times daily Apply topically to scalp twice daily PRN     cholecalciferol 25 MCG (1000 UT) TABS Take 1,000 Units by mouth daily     ezetimibe (ZETIA) 10 MG tablet Take 1 tablet (10 mg) by mouth daily     ferrous sulfate (FEROSUL) 325 (65 Fe) MG tablet Take 325 mg by mouth daily (with breakfast)     fluocinonide (LIDEX) 0.05 % external ointment Apply topically 2 times daily as needed     furosemide (LASIX) 40 MG tablet Take 0.5 tablets (20 mg) by mouth daily     glucosamine-chondroitin 500-400 MG CAPS per capsule Take 1 capsule by mouth 2 times daily     mesalamine (ASACOL HD) 800 MG EC tablet Take 1 tablet (800 mg) by mouth 2 times daily     metoprolol tartrate (LOPRESSOR) 25 MG tablet Take 12.5 mg ( 1/2 tablet) twice daily     prednisoLONE acetate (PRED FORTE) 1 % ophthalmic suspension      rosuvastatin (CRESTOR) 40 MG tablet Take 1 tablet (40 mg) by mouth daily      tamsulosin (FLOMAX) 0.4 MG capsule TAKE 1 CAPSULE BY MOUTH EVERY DAY.     warfarin ANTICOAGULANT (COUMADIN) 10 MG tablet Take 20 mg every Wednesday and 25 mg all other days or as directed by the INR clinic     warfarin ANTICOAGULANT (COUMADIN) 5 MG tablet Take 12.5mg every Mon,Wed & Fri / Take 10mg all other days OR as instructed by INR clinic     No current facility-administered medications for this visit.         Allergies   Allergen Reactions     Bees Anaphylaxis            Problem List:     Patient Active Problem List   Diagnosis     Rotator cuff (capsule) sprain     Somatic dysfunction of pelvic region     Contact dermatitis and other eczema, due to unspecified cause     Ulcerative colitis (H)     Atrial fibrillation (H)     Other specified anemias     Gastric ulcer     Bilateral low back pain with left-sided sciatica     Nonallopathic lesion of lumbar region     Nonallopathic lesion of sacral region     Diaphragmatic hernia     Abdominal pain, epigastric     Malaise and fatigue     Nocturia     Nonallopathic lesion of cervical region     Nonallopathic lesion of thoracic region     Malignant neoplasm of prostate (H)     Abdominal aortic aneurysm (H)     Nonallopathic lesion of rib cage     Vitamin D deficiency disease     Anemia relative at 12.5 in 8-13      Essential hypertension, benign     Hyperlipidemia LDL goal <100     Prostate cancer (H)     Glucose intolerance (impaired glucose tolerance)     Sciatica of left side since 2000     Valvular heart disease     Heart murmur     MRSA (methicillin resistant Staphylococcus aureus)     Health Care Home     Urinary retention     Coronary artery disease     ACP (advance care planning)     AF (atrial fibrillation) (H)     S/P aortic valve replacement     S/P CABG (coronary artery bypass graft)     Stented coronary artery     Mixed hyperlipidemia     PVD (peripheral vascular disease) (H)     Abnormal ECG     Personal history of tobacco use, presenting hazards to  health     Spinal stenosis of lumbar region without neurogenic claudication     S/P mitral valve replacement     RBBB with left anterior fascicular block     S/P lumbar spinal fusion     Long term current use of anticoagulant therapy     Chronic systolic congestive heart failure (H)     GAGE (obstructive sleep apnea)     Sepsis due to group B Streptococcus (H)     Typical atrial flutter (H)     Obesity (BMI 35.0-39.9) with comorbidity (H)     Knee pain, chronic     Total knee replacement status     Open wound of right knee     Septic joint (H)     Wound dehiscence     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, septic arthritis of unspecified location (H)     Persistent atrial fibrillation (H)            Past Medical History:     Does not need 02 supplement at night   Past Medical History:   Diagnosis Date     Abdominal pain      Abnormal ECG     RBBB, 1st degree AVB, Left axis deviation     Anemia     currently taking iron     Arrhythmia     pac, pvc     Back pain     since 1980     BPH (benign prostatic hyperplasia)      Bruit      CAD (coronary artery disease)      Cellulitis 10/18/12     Cellulitis 05/2018    GrpB strep LLE cellulitis  negative RACHAEL for veg     Chronic venous insufficiency     bilat lower extremities     Contact dermatitis and other eczema, due to unspecified cause      Diaphragmatic hernia without mention of obstruction or gangrene      Diastolic HF (heart failure) (H)      Gastric ulcer      Glucose intolerance (impaired glucose tolerance)      Heart murmur 9/16/13    valvular heart disease     Hyperlipidemia LDL goal <100 8/6/2013     Hypertension 8/6/13     Lumbago      Malaise and fatigue      Metabolic syndrome      Mobitz (type) I (Wenckebach's) atrioventricular block     and RBBB     Nocturia 10/18/12     Nonallopathic lesion of cervical region      Nonallopathic lesion of lumbar region      Nonallopathic lesion of pelvic region, not elsewhere classified      Nonallopathic lesion of rib cage       Nonallopathic lesion of sacral region      GAGE (obstructive sleep apnea)     CPAP     Paroxysmal atrial fibrillation (H) 10/18/12     Prostate cancer (H) 2008    radiation seed, XRT      PVD (peripheral vascular disease) (H)      RBBB     1st degree AVB, RBBB, LAHB     Rotator cuff strain     and sprain     S/P AAA repair      S/P aortic valve replacement 2006    developed perivalve leak and MS, therefore redo surg 2013     S/P CABG (coronary artery bypass graft) 2006    Lima-Lad, RA-Rca     Sciatica of left side     since 2000     Sepsis due to group B Streptococcus (H) 5/19/2018     Ulcerative colitis (H)      Varicose veins of bilateral lower extremities with other complications     s/p RLE vein stripping     Vitamin D deficiency              Past Surgical History:    No h/o  upper airway surgery  Past Surgical History:   Procedure Laterality Date     AORTIC VALVE REPLACEMENT  1/3/06    redo AVR SJM 21mm and SJM MVR 27mm in 2013SJM 21([id number]):AVR, SJM 27 :MVR-     APPLY WOUND VAC N/A 11/12/2019    Procedure: APPLICATION, WOUND VAC;  Surgeon: Sara Martinez MD;  Location:  OR     ARTHROPLASTY KNEE      right knee     ARTHROPLASTY KNEE Right 7/22/2019    Procedure: Right total knee arthroplasty;  Surgeon: Prasanth Jensen MD;  Location:  OR     BACK SURGERY  Oct 2015    Fusion L4-5, laminectomy L2, L3     BYPASS GRAFT ARTERY CORONARY  10/2013    reimplantation of radial artery graft to RCA     C CABG, VEIN, TWO  1/3/06    Left radial to RCA, LIMA to LAD (RA to RCA reimplanted at time of 2013 surg)     CARDIAC CATHERIZATION  11/2005    Stent placed to RCA     CARDIAC CATHERIZATION  04/2013    Occluded RCA, patent LIMA to LAD and radial graft to PDA     CARPAL TUNNEL RELEASE RT/LT  1994     COLONOSCOPY  8-22-11     CYSTOSCOPY FLEXIBLE  10/16/2013    Procedure: CYSTOSCOPY FLEXIBLE;  FLEXIBLE CYSTOSCOPY / DILATION OF URETHRA / INSERTION OF LESLIE;  Surgeon: Cooper Wallace MD;  Location:   OR     ENDOVASCULAR REPAIR, INFRARENAL ABDOMINAL AORTIC ANEURYSM/DISSECTION; MODULAR BIFURCATED PROSTHESIS  2006    AAA repair endovascular     ENT SURGERY       EP ABLATION ATRIAL FLUTTER N/A 4/22/2019    Procedure: EP Ablation Atrial Flutter;  Surgeon: Jessy Vasquez MD;  Location:  [facility]     GENITOURINARY SURGERY  6/16/08    radioactive seeding     GRAFT FLAP PEDICLE EXTREMITY (LOCATION) Right 11/12/2019    Procedure: SURGICAL PROCUREMENT, FLAP, PEDICLE, EXTREMITY;  Surgeon: Sara Martinez MD;  Location:  OR     GRAFT SKIN SPLIT THICKNESS FROM EXTREMITY Right 11/12/2019    Procedure: RIGHT GASTRONEMIUS FLAP TO RIGHT KNEE, SPLIT THICKNESS SKIN GRAFT FROM RIGHT THIGH TO RIGHT KNEE, SURGICAL PROCUREMENT, FLAP, PEDICLE, EXTREMITY, WOUND VAC PLACEMENT;  Surgeon: Sara Martinez MD;  Location:  OR     HEAD & NECK SURGERY  1997    vocal cord polypectomy     INCISION AND DRAINAGE KNEE, COMBINED Right 8/29/2019    Procedure: INCISION AND DRAINAGE, KNEE W/ Secondary Wound Closure;  Surgeon: Prasanth Jensen MD;  Location:  OR     IRRIGATION AND DEBRIDEMENT KNEE, PLACE ANTIBIOTIC CEMENT BEADS / SPACE Right 2/12/2020    Procedure: 1. Irrigation and debridement of wound breakdown, right total knee arthroplasty.  2. Irrigation and debridement of right total knee with placement of antibiotic-impregnated (vancomycin) absorbable antibiotic beads.;  Surgeon: Prasanth Jensen MD;  Location:  OR     IRRIGATION AND DEBRIDEMENT LOWER EXTREMITY, COMBINED Right 12/8/2019    Procedure: DEBRIDEMENT OF RIGHT CALF AND WOUND CLOSURE.;  Surgeon: Sara Martinez MD;  Location:  OR     KNEE SURGERY  2001 Right knee arthroscopy     OPTICAL TRACKING SYSTEM FUSION SPINE POSTERIOR LUMBAR THREE+ LEVELS N/A 10/29/2015    Procedure: OPTICAL TRACKING SYSTEM FUSION SPINE POSTERIOR LUMBAR THREE+ LEVELS;  Surgeon: Walt Garcia MD;  Location:  OR     PROSTATE SURGERY       radioactive seeding 6/16/08     REPAIR ANEURYSM ABDOMINAL AORTA  06/08     REPAIR VALVE MITRAL  10/16/2013    SJM 21([id number] [id number]):AVR, SJM 27 :MVRProcedure: REPAIR VALVE MITRAL;  REDO STERNOTOMY/REDO AORTIC VALVE REPLACEMENT/ MITRAL VALVE REPLACEMENT/REIMPLANTATION OF RIGHT CORONARY ARTERY BYPASS WITH RACHAEL ( ON PUMP);  Surgeon: Viet Singh MD;  Location:  OR     REPLACE VALVE AORTIC  10/16/2013    Procedure: REPLACE VALVE AORTIC;;  Surgeon: Viet Singh MD;  Location:  OR     SURGERY GENERAL IP CONSULT  05/2008 Excision aneurysm abdominal aorta     SURGERY GENERAL IP CONSULT  1997 Vocal cord polypectomy     VASCULAR SURGERY  1968, 1993     varicose vein stripping              Physical Examination:     Reported vitals:  There were no vitals taken for this visit.   GENERAL: Healthy, alert and no distress  PSYCH: Mentation appears normal, affect normal/bright, judgement and insight intact, normal speech and appearance well-groomed.        Copy to: Jodi Flower Mai, MD 1/14/2021       Total time spent with patient: 15 min >50% counseling and 10 minutes documenting and reviewing records  .

## 2021-01-15 ENCOUNTER — VIRTUAL VISIT (OUTPATIENT)
Dept: SLEEP MEDICINE | Facility: CLINIC | Age: 81
End: 2021-01-15
Payer: MEDICARE

## 2021-01-15 ENCOUNTER — HEALTH MAINTENANCE LETTER (OUTPATIENT)
Age: 81
End: 2021-01-15

## 2021-01-15 DIAGNOSIS — G47.33 OSA (OBSTRUCTIVE SLEEP APNEA): Primary | ICD-10-CM

## 2021-01-15 PROCEDURE — 99442 PR PHYSICIAN TELEPHONE EVALUATION 11-20 MIN: CPT | Performed by: INTERNAL MEDICINE

## 2021-02-02 ENCOUNTER — ANTICOAGULATION THERAPY VISIT (OUTPATIENT)
Dept: NURSING | Facility: CLINIC | Age: 81
End: 2021-02-02

## 2021-02-02 DIAGNOSIS — Z79.01 LONG TERM CURRENT USE OF ANTICOAGULANT THERAPY: ICD-10-CM

## 2021-02-02 DIAGNOSIS — Z95.2 S/P MITRAL VALVE REPLACEMENT: ICD-10-CM

## 2021-02-02 DIAGNOSIS — Z95.2 S/P AORTIC VALVE REPLACEMENT: ICD-10-CM

## 2021-02-02 DIAGNOSIS — I48.19 PERSISTENT ATRIAL FIBRILLATION (H): ICD-10-CM

## 2021-02-02 DIAGNOSIS — I48.91 AF (ATRIAL FIBRILLATION) (H): ICD-10-CM

## 2021-02-02 DIAGNOSIS — I48.0 PAROXYSMAL ATRIAL FIBRILLATION (H): ICD-10-CM

## 2021-02-02 LAB
CAPILLARY BLOOD COLLECTION: NORMAL
INR PPP: 2.8 (ref 0.86–1.14)

## 2021-02-02 PROCEDURE — 36416 COLLJ CAPILLARY BLOOD SPEC: CPT | Performed by: INTERNAL MEDICINE

## 2021-02-02 PROCEDURE — 99207 PR NO CHARGE NURSE ONLY: CPT

## 2021-02-02 PROCEDURE — 85610 PROTHROMBIN TIME: CPT | Performed by: INTERNAL MEDICINE

## 2021-02-02 NOTE — PROGRESS NOTES
ANTICOAGULATION FOLLOW-UP     Patient Name:  Fausto Farr  Date:  2021  Contact Type:  Telephone    SUBJECTIVE:  Patient Findings     Comments:  The patient was assessed for   diet, medication,   missed or extra doses,   bruising or bleeding,   with no problem findings.  No questions or concerns.  Reviewed previous warfarin dosing with patient.  He took warfarin as instructed.    INR is therapeutic today.   Patient will continue same maintenance dose.   Follow up in 4 weeks.              Clinical Outcomes     Comments:  The patient was assessed for   diet, medication,   missed or extra doses,   bruising or bleeding,   with no problem findings.  No questions or concerns.  Reviewed previous warfarin dosing with patient.  He took warfarin as instructed.    INR is therapeutic today.   Patient will continue same maintenance dose.   Follow up in 4 weeks.                 OBJECTIVE    Recent labs: (last 7 days)     21  0909   INR 2.80*       ASSESSMENT / PLAN  INR assessment THER    Recheck INR In: 4 WEEKS    INR Location Clinic lab     Anticoagulation Summary  As of 2021    INR goal:  2.5-3.5   TTR:  50.6 % (11.8 mo)   INR used for dosin.80 (2021)   Warfarin maintenance plan:  5 mg (10 mg x 0.5) every Mon, Wed, Fri; 7.5 mg (5 mg x 1.5) all other days   Full warfarin instructions:  5 mg every Mon, Wed, Fri; 7.5 mg all other days   Weekly warfarin total:  45 mg   No change documented:  Caryn Campos RN   Plan last modified:  Ruthann Sanders RN (2021)   Next INR check:  3/2/2021   Priority:  Maintenance   Target end date:  Indefinite    Indications    AF (atrial fibrillation) (H) [I48.91]  S/P mitral valve replacement [Z95.2]  Long term current use of anticoagulant therapy [Z79.01]  Persistent atrial fibrillation (H) [I48.19]  S/P aortic valve replacement [Z95.2]             Anticoagulation Episode Summary     INR check location:      Preferred lab:  EXTERNAL LAB    Send INR reminders to:   SHANNA DOWELL    Comments:  Rifampin stopped early June 2020 - will need less warfarin with time.          Anticoagulation Care Providers     Provider Role Specialty Phone number    Jodi Flower Mai, MD Referring Internal Medicine - Pediatrics [phone]            See the Encounter Report to view Anticoagulation Flowsheet and Dosing Calendar (Go to Encounters tab in chart review, and find the Anticoagulation Therapy Visit)        Caryn Campos RN

## 2021-03-02 ENCOUNTER — ANTICOAGULATION THERAPY VISIT (OUTPATIENT)
Dept: NURSING | Facility: CLINIC | Age: 81
End: 2021-03-02

## 2021-03-02 DIAGNOSIS — Z79.01 LONG TERM CURRENT USE OF ANTICOAGULANT THERAPY: ICD-10-CM

## 2021-03-02 DIAGNOSIS — Z95.2 S/P MITRAL VALVE REPLACEMENT: ICD-10-CM

## 2021-03-02 DIAGNOSIS — I48.0 PAROXYSMAL ATRIAL FIBRILLATION (H): ICD-10-CM

## 2021-03-02 DIAGNOSIS — I48.91 AF (ATRIAL FIBRILLATION) (H): ICD-10-CM

## 2021-03-02 DIAGNOSIS — Z95.2 S/P AORTIC VALVE REPLACEMENT: ICD-10-CM

## 2021-03-02 DIAGNOSIS — I48.19 PERSISTENT ATRIAL FIBRILLATION (H): ICD-10-CM

## 2021-03-02 LAB
CAPILLARY BLOOD COLLECTION: NORMAL
INR PPP: 2.6 (ref 0.86–1.14)

## 2021-03-02 PROCEDURE — 85610 PROTHROMBIN TIME: CPT | Performed by: INTERNAL MEDICINE

## 2021-03-02 PROCEDURE — 36416 COLLJ CAPILLARY BLOOD SPEC: CPT | Performed by: INTERNAL MEDICINE

## 2021-03-02 PROCEDURE — 99207 PR NO CHARGE NURSE ONLY: CPT

## 2021-03-02 NOTE — PROGRESS NOTES
ANTICOAGULATION FOLLOW-UP     Patient Name:  Fausto Farr  Date:  3/2/2021  Contact Type:  Telephone    SUBJECTIVE:  Patient Findings     Positives:  Missed doses (Missed on Wed. Took extra on Friday and Monday to make up for it.)    Comments:  The patient was assessed for   diet, medication,   bruising or bleeding,   with no problem findings.  No questions or concerns.  Reviewed previous warfarin dosing with patient.  He took warfarin as instructed.    INR is therapeutic today.   Patient will continue same maintenance dose.   Follow up in 4 weeks.          Clinical Outcomes     Comments:  The patient was assessed for   diet, medication,   bruising or bleeding,   with no problem findings.  No questions or concerns.  Reviewed previous warfarin dosing with patient.  He took warfarin as instructed.    INR is therapeutic today.   Patient will continue same maintenance dose.   Follow up in 4 weeks.             OBJECTIVE    Recent labs: (last 7 days)     21  0912   INR 2.60*       ASSESSMENT / PLAN  INR assessment THER    Recheck INR In: 4 WEEKS    INR Location Clinic Lab     Anticoagulation Summary  As of 3/2/2021    INR goal:  2.5-3.5   TTR:  54.3 % (1 y)   INR used for dosin.60 (3/2/2021)   Warfarin maintenance plan:  5 mg (10 mg x 0.5) every Mon, Wed, Fri; 7.5 mg (5 mg x 1.5) all other days   Full warfarin instructions:  5 mg every Mon, Wed, Fri; 7.5 mg all other days   Weekly warfarin total:  45 mg   No change documented:  Caryn Campos RN   Plan last modified:  Ruthann Sanders RN (2021)   Next INR check:  3/30/2021   Priority:  Maintenance   Target end date:  Indefinite    Indications    AF (atrial fibrillation) (H) [I48.91]  S/P mitral valve replacement [Z95.2]  Long term current use of anticoagulant therapy [Z79.01]  Persistent atrial fibrillation (H) [I48.19]  S/P aortic valve replacement [Z95.2]             Anticoagulation Episode Summary     INR check location:      Preferred lab:   EXTERNAL LAB    Send INR reminders to:  SHANNA DOWELL    Comments:  Rifampin stopped early June 2020 - will need less warfarin with time.          Anticoagulation Care Providers     Provider Role Specialty Phone number    Jodi Flower Mai, MD Referring Internal Medicine - Pediatrics [phone]            See the Encounter Report to view Anticoagulation Flowsheet and Dosing Calendar (Go to Encounters tab in chart review, and find the Anticoagulation Therapy Visit)        Caryn Campos RN

## 2021-03-15 ENCOUNTER — TELEPHONE (OUTPATIENT)
Dept: CARDIOLOGY | Facility: CLINIC | Age: 81
End: 2021-03-15

## 2021-03-15 DIAGNOSIS — I48.11 LONGSTANDING PERSISTENT ATRIAL FIBRILLATION (H): Primary | ICD-10-CM

## 2021-03-15 NOTE — TELEPHONE ENCOUNTER
Have him hold his metoprolol for 1 week.     Continue to take his his BP and HR.   Call or send a my chart message in 1 week with symptoms, BP and HR.      We will consider adding a monitor prior to seeing Dr. Santo.     Thank you,    ADELE Salomon CNP on 3/15/2021 at 3:07 PM

## 2021-03-15 NOTE — TELEPHONE ENCOUNTER
"3/15/21 Pt called stating for he past 2 weeks he has noted an increase in episodes of feeling dizzy. He has really noticed it in the mornings when he wakes up. He needs to sit at the side of the bed for a few minutes . After the episode he feels \"foggy\" in the head and develops a headache where he needs to take Tylenol. The only time vitals were taken at home was this morning. /54 p 50.   He has episodes throughout the day as well which last a few minutes but feels most concerned with the early morning episodes.  Pt is to see Dr Santo on 4/19 but he called wanting to send Anabell an update. Informed pt I will update Eduin dn call pt back with recommendations  Pt voiced understanding and agreement with plan.   Kylie  "

## 2021-03-15 NOTE — TELEPHONE ENCOUNTER
3/15/21 Msg recd from Anabell Xiao AURELIA  Have him hold his metoprolol for 1 week.     Continue to take his his BP and HR.   Call or send a my chart message in 1 week with symptoms, BP and HR.       We will consider adding a monitor prior to seeing Dr. Santo.     Spoke with pt and explained recommendations. Pt voiced understanding and agreement . He will be calling on 3/22 with update.  Kylie 330 pm

## 2021-03-22 NOTE — TELEPHONE ENCOUNTER
Spoke to patient to let him now that Anabell wants him to wear a 7 day monitor prior to seeing Dr Santo in April.  Orders are in epic.  Patient provided verbal understanding regarding above.  Patient sent scheduling to set up monitor.  JUSTIN Vargas

## 2021-03-22 NOTE — TELEPHONE ENCOUNTER
Will have him wear a 7 day monitor which would then be ready for Dr. Santo at follow up in April 2021.   Orders are in for application in [place]    Thank you,    Anabell Xiao, ADELE CNP on 3/22/2021 at 3:35 PM

## 2021-03-22 NOTE — TELEPHONE ENCOUNTER
Patient called with home BP and HR readings since being off of metoprolol.  Reports he has been feeling much better and less dizzy since being off of medications.  Below you will find reading from the last 8 days.  3/15  Morning  /54  HR 50  Evening /59  HR 58  3/16  Morning  /77 HR 58  Evening /53  HR 71  3/17  Morning  /62  HR 66  Evening /76  HR 76  3/18  Morning  /71  HR 93  Evening /61  HR 74  3/19  Morning  /67  HR 79  Evening /72  HR 82  3/20  Morning  /80  HR 86  Evening /67 HR 66  3/21  Morning  /68  HR 73  Evening /54  HR 88   3/22  Morning  /74   Recheck  /72    Denies feeling unwell with elevated HR    Will have Anabell Xiao, AURELIA review.  JUSTIN Vargas

## 2021-03-25 ENCOUNTER — HOSPITAL ENCOUNTER (OUTPATIENT)
Dept: CARDIOLOGY | Facility: CLINIC | Age: 81
Discharge: HOME | End: 2021-03-25
Attending: NURSE PRACTITIONER | Admitting: NURSE PRACTITIONER
Payer: MEDICARE

## 2021-03-25 DIAGNOSIS — I48.11 LONGSTANDING PERSISTENT ATRIAL FIBRILLATION (H): ICD-10-CM

## 2021-03-25 PROCEDURE — 93244 EXT ECG>48HR<7D REV&INTERPJ: CPT | Performed by: INTERNAL MEDICINE

## 2021-03-25 PROCEDURE — 93242 EXT ECG>48HR<7D RECORDING: CPT

## 2021-03-30 ENCOUNTER — ANTICOAGULATION THERAPY VISIT (OUTPATIENT)
Dept: NURSING | Facility: CLINIC | Age: 81
End: 2021-03-30

## 2021-03-30 DIAGNOSIS — Z95.2 S/P AORTIC VALVE REPLACEMENT: ICD-10-CM

## 2021-03-30 DIAGNOSIS — Z95.2 S/P MITRAL VALVE REPLACEMENT: ICD-10-CM

## 2021-03-30 DIAGNOSIS — I48.91 AF (ATRIAL FIBRILLATION) (H): ICD-10-CM

## 2021-03-30 DIAGNOSIS — Z79.01 LONG TERM CURRENT USE OF ANTICOAGULANT THERAPY: ICD-10-CM

## 2021-03-30 DIAGNOSIS — I48.19 PERSISTENT ATRIAL FIBRILLATION (H): ICD-10-CM

## 2021-03-30 DIAGNOSIS — I48.0 PAROXYSMAL ATRIAL FIBRILLATION (H): ICD-10-CM

## 2021-03-30 LAB
CAPILLARY BLOOD COLLECTION: NORMAL
INR PPP: 2.2 (ref 0.86–1.14)

## 2021-03-30 PROCEDURE — 85610 PROTHROMBIN TIME: CPT | Performed by: INTERNAL MEDICINE

## 2021-03-30 PROCEDURE — 99207 PR NO CHARGE NURSE ONLY: CPT

## 2021-03-30 PROCEDURE — 36416 COLLJ CAPILLARY BLOOD SPEC: CPT | Performed by: INTERNAL MEDICINE

## 2021-03-30 NOTE — PROGRESS NOTES
ANTICOAGULATION FOLLOW-UP     Patient Name:  Fausto Farr  Date:  3/30/2021  Contact Type:  Telephone    SUBJECTIVE:  Patient Findings     Positives:  Missed doses (Reviewed previous warfarin dosing with patient. He missed a dose last week and the week before. He couldn't remember the specific day.)    Comments:  No increased intake of green vegetables,   No medication changes,   No bleeding/bruising,   No signs/symptoms of a clot.    INR is subtherapeutic today.   Patient will take 7.5 mg today and tomorrow which will increase weekly total by 5.6%, then resume maintenance dose.   Follow up in 2 weeks per protocol.   He wanted to wait 4 weeks.  Compromised on 3 weeks.                 Clinical Outcomes     Comments:  No increased intake of green vegetables,   No medication changes,   No bleeding/bruising,   No signs/symptoms of a clot.    INR is subtherapeutic today.   Patient will take 7.5 mg today and tomorrow which will increase weekly total by 5.6%, then resume maintenance dose.   Follow up in 2 weeks per protocol.   He wanted to wait 4 weeks.  Compromised on 3 weeks.                    OBJECTIVE    Recent labs: (last 7 days)     21  0900   INR 2.20*       ASSESSMENT / PLAN  INR assessment SUB    Recheck INR In: 3 WEEKS    INR Location Clinic lab     Anticoagulation Summary  As of 3/30/2021    INR goal:  2.5-3.5   TTR:  52.9 % (1 y)   INR used for dosin.20 (3/30/2021)   Warfarin maintenance plan:  5 mg (10 mg x 0.5) every Mon, Wed, Fri; 7.5 mg (5 mg x 1.5) all other days   Full warfarin instructions:  3/31: 7.5 mg; Otherwise 5 mg every Mon, Wed, Fri; 7.5 mg all other days   Weekly warfarin total:  45 mg   Plan last modified:  Ruthann Sanders RN (2021)   Next INR check:  2021   Priority:  Maintenance   Target end date:  Indefinite    Indications    AF (atrial fibrillation) (H) [I48.91]  S/P mitral valve replacement [Z95.2]  Long term current use of anticoagulant therapy  [Z79.01]  Persistent atrial fibrillation (H) [I48.19]  S/P aortic valve replacement [Z95.2]             Anticoagulation Episode Summary     INR check location:      Preferred lab:  EXTERNAL LAB    Send INR reminders to:  SHANNA DOWELL    Comments:  Rifampin stopped early June 2020 - will need less warfarin with time.          Anticoagulation Care Providers     Provider Role Specialty Phone number    Jodi Flower Mai, MD Referring Internal Medicine - Pediatrics [phone]            See the Encounter Report to view Anticoagulation Flowsheet and Dosing Calendar (Go to Encounters tab in chart review, and find the Anticoagulation Therapy Visit)        Caryn Campos RN

## 2021-04-02 DIAGNOSIS — L21.9 SEBORRHEIC DERMATITIS: ICD-10-CM

## 2021-04-02 NOTE — TELEPHONE ENCOUNTER
Reason for Call:  Other prescription    Detailed comments: Pt is requesting medication refill of betamethasone valerate (VALISONE) 0.1 % external lotion and would like two more refills after the first so he doesn't have to keep calling back this year to get it refilled.    Phone Number Patient can be reached at: Home number on file [phone] (home)    Best Time: asap    Can we leave a detailed message on this number? YES    Call taken on 4/2/2021 at 3:13 PM by Hallie Meyer

## 2021-04-09 RX ORDER — BETAMETHASONE VALERATE 0.1 %
LOTION (ML) TOPICAL 2 TIMES DAILY
Qty: 60 ML | Refills: 3 | Status: SHIPPED | OUTPATIENT
Start: 2021-04-09

## 2021-04-13 ENCOUNTER — TRANSFERRED RECORDS (OUTPATIENT)
Dept: HEALTH INFORMATION MANAGEMENT | Facility: CLINIC | Age: 81
End: 2021-04-13

## 2021-04-14 ENCOUNTER — TELEPHONE (OUTPATIENT)
Dept: CARDIOLOGY | Facility: CLINIC | Age: 81
End: 2021-04-14

## 2021-04-14 NOTE — TELEPHONE ENCOUNTER
If he felt terrible on metoprolol and better off per noted dated 3/15/2021.  Then he can discuss with Dr. Santo on 4/19/2021     Thank you, Anabell Xiao, ADELE CNP on 4/14/2021 at 4:42 PM

## 2021-04-14 NOTE — TELEPHONE ENCOUNTER
Can you call pt and have him restart his metoprolol tartrate 12.5 mg twice daily.   His monitor showed sinus rhythm with average HR of 78 bpm range  bpm.  He had lots of NSVT longest 20 beats with a 11% PVC burden - this is 3% higher burden than previous monitor which was done when he was on metoprolol tartrate 12.5 mg twice daily.  He follows up with Dr. Santo on 4/19/2021    Thank you,    Anabell Xiao, ADELE CNP on 4/14/2021 at 3:13 PM

## 2021-04-15 ENCOUNTER — TELEPHONE (OUTPATIENT)
Dept: PEDIATRICS | Facility: CLINIC | Age: 81
End: 2021-04-15

## 2021-04-15 NOTE — TELEPHONE ENCOUNTER
Patient called in to [facility] direct line.     Patient is requesting a refill of Betamethasone.    A new prescription for Betamethasone was signed on 4/9/21 and sent to [facility] pharmacy in [place].   The patient prefers to have the [facility] pharmacy in [place] on [address] fill his medications.     Preferred pharmacy changed in [facility].     Advised patient to contact the pharmacy and request a prescription transfer to the preferred pharmacy for this medication.     Patient verbalized understanding.     Juan Carlos Parmar RN on 4/15/2021 at 9:22 AM

## 2021-04-19 ENCOUNTER — OFFICE VISIT (OUTPATIENT)
Dept: CARDIOLOGY | Facility: CLINIC | Age: 81
End: 2021-04-19
Attending: NURSE PRACTITIONER
Payer: MEDICARE

## 2021-04-19 VITALS
WEIGHT: 224 LBS | OXYGEN SATURATION: 95 % | HEIGHT: 66 IN | BODY MASS INDEX: 36 KG/M2 | SYSTOLIC BLOOD PRESSURE: 128 MMHG | HEART RATE: 96 BPM | DIASTOLIC BLOOD PRESSURE: 68 MMHG

## 2021-04-19 DIAGNOSIS — I48.0 PAROXYSMAL ATRIAL FIBRILLATION (H): ICD-10-CM

## 2021-04-19 DIAGNOSIS — I44.30 HEART BLOCK ATRIOVENTRICULAR: Primary | ICD-10-CM

## 2021-04-19 DIAGNOSIS — I38 VALVULAR HEART DISEASE: ICD-10-CM

## 2021-04-19 DIAGNOSIS — I25.810 CORONARY ARTERY DISEASE INVOLVING CORONARY BYPASS GRAFT OF NATIVE HEART WITHOUT ANGINA PECTORIS: ICD-10-CM

## 2021-04-19 DIAGNOSIS — L21.9 SEBORRHEIC DERMATITIS: ICD-10-CM

## 2021-04-19 PROCEDURE — 99214 OFFICE O/P EST MOD 30 MIN: CPT | Performed by: INTERNAL MEDICINE

## 2021-04-19 RX ORDER — BETAMETHASONE VALERATE 0.1 %
LOTION (ML) TOPICAL 2 TIMES DAILY
Qty: 60 ML | Refills: 3 | Status: CANCELLED | OUTPATIENT
Start: 2021-04-19

## 2021-04-19 ASSESSMENT — MIFFLIN-ST. JEOR: SCORE: 1665.87

## 2021-04-19 NOTE — LETTER
4/19/2021    Chris Flower MD  [address] [place] [address]    RE: Fausto Farr       Dear Colleague,    I had the pleasure of seeing Fausto Farr in the [facility].    HPI and Plan:   See dictation    Orders Placed This Encounter   Procedures     Follow-Up with Cardiologist     EKG 12-lead complete w/read - Clinics (to be scheduled)     Leadless EKG Monitor 3 to 7 Days     Echocardiogram Complete     No orders of the defined types were placed in this encounter.    Medications Discontinued During This Encounter   Medication Reason     metoprolol tartrate (LOPRESSOR) 25 MG tablet          Encounter Diagnoses   Name Primary?     Valvular heart disease      Heart block atrioventricular Yes     Coronary artery disease involving coronary bypass graft 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      Paroxysmal atrial fibrillation (H)        CURRENT MEDICATIONS:  Current Outpatient Medications   Medication Sig Dispense Refill     acetaminophen (TYLENOL) 500 MG tablet Take 500-1,000 mg by mouth every 6 hours as needed for pain       amoxicillin-clavulanate (AUGMENTIN) 875-125 MG tablet Take 1 tablet by mouth daily       atropine 1 % ophthalmic solution START 3 DAYS BEFORE SURGERY. INSTILL 1 DROP IN OPERATED EYE BID       betamethasone valerate (VALISONE) 0.1 % external lotion Apply topically 2 times daily Apply topically to scalp twice daily PRN 60 mL 3     cholecalciferol 25 MCG (1000 UT) TABS Take 1,000 Units by mouth daily       ezetimibe (ZETIA) 10 MG tablet Take 1 tablet (10 mg) by mouth daily 90 tablet 3     ferrous sulfate (FEROSUL) 325 (65 Fe) MG tablet Take 325 mg by mouth daily (with breakfast)       fluocinonide (LIDEX) 0.05 % external ointment Apply topically 2 times daily as needed       furosemide (LASIX) 40 MG tablet Take 0.5 tablets (20 mg) by mouth daily 45 tablet 3     glucosamine-chondroitin 500-400 MG CAPS per capsule Take  1 capsule by mouth 2 times daily       mesalamine (ASACOL HD) 800 MG EC tablet Take 1 tablet (800 mg) by mouth 2 times daily 180 tablet 3     prednisoLONE acetate (PRED FORTE) 1 % ophthalmic suspension        rosuvastatin (CRESTOR) 40 MG tablet Take 1 tablet (40 mg) by mouth daily 90 tablet 3     tamsulosin (FLOMAX) 0.4 MG capsule TAKE 1 CAPSULE BY MOUTH EVERY DAY. 90 capsule 3     warfarin ANTICOAGULANT (COUMADIN) 10 MG tablet Take 20 mg every Wednesday and 25 mg all other days or as directed by the INR clinic 220 tablet 0     warfarin ANTICOAGULANT (COUMADIN) 5 MG tablet Take 12.5mg every Mon,Wed & Fri / Take 10mg all other days OR as instructed by INR clinic 186 tablet 0       ALLERGIES     Allergies   Allergen Reactions     Bees Anaphylaxis       PAST MEDICAL HISTORY:  Past Medical History:   Diagnosis Date     Abdominal pain      Abnormal ECG     RBBB, 1st degree AVB, Left axis deviation     Anemia     currently taking iron     Arrhythmia     pac, pvc, NSVT, Afib off BB, prior AFlablation,  and RBBB and LAHB and 1st degree AVB and mobitz 2 on BB     Back pain     since 1980     BPH (benign prostatic hyperplasia)      Bruit      CAD (coronary artery disease)      Cellulitis 10/18/2012     Cellulitis 05/2018    GrpB strep LLE cellulitis  negative RACHAEL for veg     Chronic venous insufficiency     bilat lower extremities     Contact dermatitis and other eczema, due to unspecified cause      Diaphragmatic hernia without mention of obstruction or gangrene      Diastolic HF (heart failure) (H)      Gastric ulcer      Glucose intolerance (impaired glucose tolerance)      Heart murmur 09/16/2013    valvular heart disease     Hyperlipidemia LDL goal <100 08/06/2013     Hypertension 08/06/2013     Lumbago      Malaise and fatigue      Mesenteric artery insufficiency (H)     known AAA and narrowing of intestinal artery's  followed by dr raymond     Metabolic syndrome      Mitral valve disease     s/p mechanical MVR, prior  [facility] AVR     Nocturia 10/18/2012     Nonallopathic lesion of cervical region      Nonallopathic lesion of lumbar region      Nonallopathic lesion of pelvic region, not elsewhere classified      Nonallopathic lesion of rib cage      Nonallopathic lesion of sacral region      GAGE (obstructive sleep apnea)     CPAP     Paroxysmal atrial fibrillation (H) 10/18/2012    occured after stopping BB  (prior  aflutter ablation)     Prostate cancer (H) 2008    radiation seed, XRT      PVD (peripheral vascular disease) (H)      Rotator cuff strain     and sprain     S/P AAA repair      S/P aortic valve replacement 2006    developed perivalve leak and MS, therefore redo surg 2013     S/P CABG (coronary artery bypass graft) 2006    Lima-Lad, RA-Rca     Sciatica of left side     since 2000     Sepsis due to group B Streptococcus (H) 05/19/2018     Ulcerative colitis (H)      Varicose veins of bilateral lower extremities with other complications     s/p RLE vein stripping     Vitamin D deficiency        PAST SURGICAL HISTORY:  Past Surgical History:   Procedure Laterality Date     AORTIC VALVE REPLACEMENT  1/3/06    redo AVR SJM 21mm and SJM MVR 27mm in 2013SJ 21([id number]):AVR, SJM 27 :MVR-     APPLY WOUND VAC N/A 11/12/2019    Procedure: APPLICATION, WOUND VAC;  Surgeon: Sara Martinez MD;  Location: [facility] OR     ARTHROPLASTY KNEE      right knee     ARTHROPLASTY KNEE Right 7/22/2019    Procedure: Right total knee arthroplasty;  Surgeon: Prasanth Jensen MD;  Location: [facility] OR     BACK SURGERY  Oct 2015    Fusion L4-5, laminectomy L2, L3     BYPASS GRAFT ARTERY CORONARY  10/2013    reimplantation of radial artery graft to RCA     C CABG, VEIN, TWO  1/3/06    Left radial to RCA, LIMA to LAD (RA to RCA reimplanted at time of 2013 surg)     CARDIAC CATHERIZATION  11/2005    Stent placed to RCA     CARDIAC CATHERIZATION  04/2013    Occluded RCA, patent LIMA to LAD and radial graft to PDA     CARPAL TUNNEL RELEASE RT/LT   1994     COLONOSCOPY  8-22-11     CYSTOSCOPY FLEXIBLE  10/16/2013    Procedure: CYSTOSCOPY FLEXIBLE;  FLEXIBLE CYSTOSCOPY / DILATION OF URETHRA / INSERTION OF LESLIE;  Surgeon: Cooper Wallace MD;  Location:  OR     ENDOVASCULAR REPAIR, INFRARENAL ABDOMINAL AORTIC ANEURYSM/DISSECTION; MODULAR BIFURCATED PROSTHESIS  2006    AAA repair endovascular     ENT SURGERY       EP ABLATION ATRIAL FLUTTER N/A 4/22/2019    Procedure: EP Ablation Atrial Flutter;  Surgeon: Jessy Vasquez MD;  Location:  HEART CARDIAC CATH LAB     GENITOURINARY SURGERY  6/16/08    radioactive seeding     GRAFT FLAP PEDICLE EXTREMITY (LOCATION) Right 11/12/2019    Procedure: SURGICAL PROCUREMENT, FLAP, PEDICLE, EXTREMITY;  Surgeon: Sara Martinez MD;  Location:  OR     GRAFT SKIN SPLIT THICKNESS FROM EXTREMITY Right 11/12/2019    Procedure: RIGHT GASTRONEMIUS FLAP TO RIGHT KNEE, SPLIT THICKNESS SKIN GRAFT FROM RIGHT THIGH TO RIGHT KNEE, SURGICAL PROCUREMENT, FLAP, PEDICLE, EXTREMITY, WOUND VAC PLACEMENT;  Surgeon: Sara Martinez MD;  Location:  OR     HEAD & NECK SURGERY  1997    vocal cord polypectomy     INCISION AND DRAINAGE KNEE, COMBINED Right 8/29/2019    Procedure: INCISION AND DRAINAGE, KNEE W/ Secondary Wound Closure;  Surgeon: Prasanth Jensen MD;  Location:  OR     IRRIGATION AND DEBRIDEMENT KNEE, PLACE ANTIBIOTIC CEMENT BEADS / SPACE Right 2/12/2020    Procedure: 1. Irrigation and debridement of wound breakdown, right total knee arthroplasty.  2. Irrigation and debridement of right total knee with placement of antibiotic-impregnated (vancomycin) absorbable antibiotic beads.;  Surgeon: Prasanth Jensen MD;  Location: [facility] OR     IRRIGATION AND DEBRIDEMENT LOWER EXTREMITY, COMBINED Right 12/8/2019    Procedure: DEBRIDEMENT OF RIGHT CALF AND WOUND CLOSURE.;  Surgeon: Sara Martinez MD;  Location:  OR     KNEE SURGERY  2001 Right knee arthroscopy     OPTICAL TRACKING SYSTEM FUSION  SPINE POSTERIOR LUMBAR THREE+ LEVELS N/A 10/29/2015    Procedure: OPTICAL TRACKING SYSTEM FUSION SPINE POSTERIOR LUMBAR THREE+ LEVELS;  Surgeon: Walt Garcia MD;  Location:  OR     PROSTATE SURGERY      radioactive seeding 08     REPAIR ANEURYSM ABDOMINAL AORTA       REPAIR VALVE MITRAL  10/16/2013    SJM 21([id number] [id number]):AVR, SJM 27 :MVRProcedure: REPAIR VALVE MITRAL;  REDO STERNOTOMY/REDO AORTIC VALVE REPLACEMENT/ MITRAL VALVE REPLACEMENT/REIMPLANTATION OF RIGHT CORONARY ARTERY BYPASS WITH RACHAEL ( ON PUMP);  Surgeon: Viet Singh MD;  Location:  OR     REPLACE VALVE AORTIC  10/16/2013    Procedure: REPLACE VALVE AORTIC;;  Surgeon: Viet Singh MD;  Location: [facility] OR     SURGERY GENERAL IP CONSULT  2008 Excision aneurysm abdominal aorta     SURGERY GENERAL IP CONSULT   Vocal cord polypectomy     VASCULAR SURGERY  1993     varicose vein stripping       FAMILY HISTORY:  Family History   Problem Relation Age of Onset     No Known Problems Mother      Coronary Artery Disease Father         CABG     Heart Disease Father         Pacemaker     No Known Problems Brother      No Known Problems Sister      No Known Problems Son      Other Cancer Daughter      No Known Problems Daughter      Heart Disease Brother      Other - See Comments Grandchild        SOCIAL HISTORY:  Social History     Socioeconomic History     Marital status:      Spouse name: None     Number of children: None     Years of education: None     Highest education level: None   Occupational History     None   Social Needs     Financial resource strain: None     Food insecurity     Worry: None     Inability: None     Transportation needs     Medical: None     Non-medical: None   Tobacco Use     Smoking status: Former Smoker     Packs/day: 1.00     Years: 40.00     Pack years: 40.00     Start date: 4/15/1962     Quit date: 10/23/2002     Years since quittin.5     Smokeless tobacco: Never Used  "  Substance and Sexual Activity     Alcohol use: Yes     Frequency: 2-4 times a month     Drinks per session: 1 or 2     Comment: a couple beers per week (socially)     Drug use: No     Sexual activity: Not Currently     Partners: Female   Lifestyle     Physical activity     Days per week: None     Minutes per session: None     Stress: None   Relationships     Social connections     Talks on phone: None     Gets together: None     Attends Restoration service: None     Active member of club or organization: None     Attends meetings of clubs or organizations: None     Relationship status: None     Intimate partner violence     Fear of current or ex partner: None     Emotionally abused: None     Physically abused: None     Forced sexual activity: None   Other Topics Concern     Parent/sibling w/ CABG, MI or angioplasty before 65F 55M? Yes     Comment: Brother had bypass at 55      Service Not Asked     Blood Transfusions Not Asked     Caffeine Concern No     Comment: 6-8 cups of half and half per day     Occupational Exposure Not Asked     Hobby Hazards Not Asked     Sleep Concern Not Asked     Stress Concern Not Asked     Weight Concern Not Asked     Special Diet No     Back Care Not Asked     Exercise No     Bike Helmet Not Asked     Seat Belt Not Asked     Self-Exams Not Asked   Social History Narrative     None       Review of Systems:  Skin:  Negative     Eyes:  Positive for glasses;cataracts  ENT:  Positive for hearing loss  Respiratory:  Positive for sleep apnea;CPAP;dyspnea on exertion  Cardiovascular:    dizziness;Positive for  Gastroenterology: Negative    Genitourinary:  Negative    Musculoskeletal:  Negative    Neurologic:  Negative    Psychiatric:  Negative    Heme/Lymph/Imm:  Negative    Endocrine:  Negative      Physical Exam:  Vitals: /68 (BP Location: Right arm, Patient Position: Sitting, Cuff Size: Adult Large)   Pulse 96   Ht 1.664 m (5' 5.5\")   Wt 101.6 kg (224 lb)   SpO2 95%   BMI " 36.71 kg/m      Constitutional:  cooperative, alert and oriented, well developed, well nourished, in no acute distress        Skin:  warm and dry to the touch, no apparent skin lesions or masses noted          Head:  normocephalic, no masses or lesions        Eyes:  pupils equal and round, conjunctivae and lids unremarkable, sclera white, no xanthalasma, EOMS intact, no nystagmus        Lymph:No Cervical lymphadenopathy present;No thyromegaly     ENT:           Neck:  carotid pulses are full and equal bilaterally, JVP normal, no carotid bruit   negative carotid sinus massage    Respiratory:  normal breath sounds, clear to auscultation, normal A-P diameter, normal symmetry, normal respiratory excursion, no use of accessory muscles         Cardiac:   frequent premature beats   crisp prosthetic valve sounds systolic murmur                                            prominant varicose veins jesus alberto LLE    GI:  abdomen soft obese      Extremities and Muscular Skeletal:      bilateral LE edema;trace          Neurological:  no gross motor deficits        Psych:         Recent Lab Results:  LIPID RESULTS:  Lab Results   Component Value Date    CHOL 152 06/01/2020    HDL 34 (L) 06/01/2020    LDL 90 06/01/2020    TRIG 138 06/01/2020    CHOLHDLRATIO 3.6 06/03/2015       LIVER ENZYME RESULTS:  Lab Results   Component Value Date    AST 19 06/01/2020    ALT 16 06/01/2020       CBC RESULTS:  Lab Results   Component Value Date    WBC 7.6 06/15/2020    RBC 4.44 06/15/2020    HGB 13.0 (L) 06/15/2020    HCT 40.0 06/15/2020    MCV 90 06/15/2020    MCH 29.3 06/15/2020    MCHC 32.5 06/15/2020    RDW 13.3 06/15/2020     06/15/2020       BMP RESULTS:  Lab Results   Component Value Date     06/01/2020    POTASSIUM 4.0 06/01/2020    CHLORIDE 106 06/01/2020    CO2 26 06/01/2020    ANIONGAP 4 06/01/2020     (H) 06/01/2020    BUN 13 06/01/2020    CR 0.73 06/01/2020    GFRESTIMATED 72 10/06/2020    GFRESTBLACK 87 10/06/2020     AWILDA 8.5 06/01/2020        A1C RESULTS:  Lab Results   Component Value Date    A1C 5.9 04/25/2017       INR RESULTS:  Lab Results   Component Value Date    INR 2.20 (H) 03/30/2021    INR 2.60 (H) 03/02/2021     Thank you for allowing me to participate in the care of your patient.      Sincerely,     Calderon Santo MD     [facility]  cc:   Anabell Xiao, APRN CNP  [address]  [place] [id number]

## 2021-04-19 NOTE — PROGRESS NOTES
HPI and Plan:   See dictation    Orders Placed This Encounter   Procedures     Follow-Up with Cardiologist     EKG 12-lead complete w/read - Clinics (to be scheduled)     Leadless EKG Monitor 3 to 7 Days     Echocardiogram Complete     No orders of the defined types were placed in this encounter.    Medications Discontinued During This Encounter   Medication Reason     metoprolol tartrate (LOPRESSOR) 25 MG tablet          Encounter Diagnoses   Name Primary?     Valvular heart disease      Heart block atrioventricular Yes     Coronary artery disease involving coronary bypass graft 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      Paroxysmal atrial fibrillation (H)        CURRENT MEDICATIONS:  Current Outpatient Medications   Medication Sig Dispense Refill     acetaminophen (TYLENOL) 500 MG tablet Take 500-1,000 mg by mouth every 6 hours as needed for pain       amoxicillin-clavulanate (AUGMENTIN) 875-125 MG tablet Take 1 tablet by mouth daily       atropine 1 % ophthalmic solution START 3 DAYS BEFORE SURGERY. INSTILL 1 DROP IN OPERATED EYE BID       betamethasone valerate (VALISONE) 0.1 % external lotion Apply topically 2 times daily Apply topically to scalp twice daily PRN 60 mL 3     cholecalciferol 25 MCG (1000 UT) TABS Take 1,000 Units by mouth daily       ezetimibe (ZETIA) 10 MG tablet Take 1 tablet (10 mg) by mouth daily 90 tablet 3     ferrous sulfate (FEROSUL) 325 (65 Fe) MG tablet Take 325 mg by mouth daily (with breakfast)       fluocinonide (LIDEX) 0.05 % external ointment Apply topically 2 times daily as needed       furosemide (LASIX) 40 MG tablet Take 0.5 tablets (20 mg) by mouth daily 45 tablet 3     glucosamine-chondroitin 500-400 MG CAPS per capsule Take 1 capsule by mouth 2 times daily       mesalamine (ASACOL HD) 800 MG EC tablet Take 1 tablet (800 mg) by mouth 2 times daily 180 tablet 3     prednisoLONE acetate (PRED FORTE) 1 % ophthalmic suspension        rosuvastatin (CRESTOR) 40 MG tablet Take 1  tablet (40 mg) by mouth daily 90 tablet 3     tamsulosin (FLOMAX) 0.4 MG capsule TAKE 1 CAPSULE BY MOUTH EVERY DAY. 90 capsule 3     warfarin ANTICOAGULANT (COUMADIN) 10 MG tablet Take 20 mg every Wednesday and 25 mg all other days or as directed by the INR clinic 220 tablet 0     warfarin ANTICOAGULANT (COUMADIN) 5 MG tablet Take 12.5mg every Mon,Wed & Fri / Take 10mg all other days OR as instructed by INR clinic 186 tablet 0       ALLERGIES     Allergies   Allergen Reactions     Bees Anaphylaxis       PAST MEDICAL HISTORY:  Past Medical History:   Diagnosis Date     Abdominal pain      Abnormal ECG     RBBB, 1st degree AVB, Left axis deviation     Anemia     currently taking iron     Arrhythmia     pac, pvc, NSVT, Afib off BB, prior AFlablation,  and RBBB and LAHB and 1st degree AVB and mobitz 2 on BB     Back pain     since 1980     BPH (benign prostatic hyperplasia)      Bruit      CAD (coronary artery disease)      Cellulitis 10/18/2012     Cellulitis 05/2018    GrpB strep LLE cellulitis  negative RACHAEL for veg     Chronic venous insufficiency     bilat lower extremities     Contact dermatitis and other eczema, due to unspecified cause      Diaphragmatic hernia without mention of obstruction or gangrene      Diastolic HF (heart failure) (H)      Gastric ulcer      Glucose intolerance (impaired glucose tolerance)      Heart murmur 09/16/2013    valvular heart disease     Hyperlipidemia LDL goal <100 08/06/2013     Hypertension 08/06/2013     Lumbago      Malaise and fatigue      Mesenteric artery insufficiency (H)     known AAA and narrowing of intestinal artery's  followed by dr raymond     Metabolic syndrome      Mitral valve disease     s/p mechanical MVR, prior mech AVR     Nocturia 10/18/2012     Nonallopathic lesion of cervical region      Nonallopathic lesion of lumbar region      Nonallopathic lesion of pelvic region, not elsewhere classified      Nonallopathic lesion of rib cage      Nonallopathic lesion  of sacral region      GAGE (obstructive sleep apnea)     CPAP     Paroxysmal atrial fibrillation (H) 10/18/2012    occured after stopping BB  (prior  aflutter ablation)     Prostate cancer (H) 2008    radiation seed, XRT      PVD (peripheral vascular disease) (H)      Rotator cuff strain     and sprain     S/P AAA repair      S/P aortic valve replacement 2006    developed perivalve leak and MS, therefore redo surg 2013     S/P CABG (coronary artery bypass graft) 2006    Lima-Lad, RA-Rca     Sciatica of left side     since 2000     Sepsis due to group B Streptococcus (H) 05/19/2018     Ulcerative colitis (H)      Varicose veins of bilateral lower extremities with other complications     s/p RLE vein stripping     Vitamin D deficiency        PAST SURGICAL HISTORY:  Past Surgical History:   Procedure Laterality Date     AORTIC VALVE REPLACEMENT  1/3/06    redo AVR SJM 21mm and SJM MVR 27mm in 2013SJM 21([id number]):AVR, SJM 27 :MVR-     APPLY WOUND VAC N/A 11/12/2019    Procedure: APPLICATION, WOUND VAC;  Surgeon: Sara Martinez MD;  Location:  OR     ARTHROPLASTY KNEE      right knee     ARTHROPLASTY KNEE Right 7/22/2019    Procedure: Right total knee arthroplasty;  Surgeon: Prasanth Jensen MD;  Location: [facility] OR     BACK SURGERY  Oct 2015    Fusion L4-5, laminectomy L2, L3     BYPASS GRAFT ARTERY CORONARY  10/2013    reimplantation of radial artery graft to RCA     C CABG, VEIN, TWO  1/3/06    Left radial to RCA, LIMA to LAD (RA to RCA reimplanted at time of 2013 surg)     CARDIAC CATHERIZATION  11/2005    Stent placed to RCA     CARDIAC CATHERIZATION  04/2013    Occluded RCA, patent LIMA to LAD and radial graft to PDA     CARPAL TUNNEL RELEASE RT/LT  1994     COLONOSCOPY  8-22-11     CYSTOSCOPY FLEXIBLE  10/16/2013    Procedure: CYSTOSCOPY FLEXIBLE;  FLEXIBLE CYSTOSCOPY / DILATION OF URETHRA / INSERTION OF LESLIE;  Surgeon: Cooper Wallace MD;  Location:  OR     ENDOVASCULAR REPAIR, INFRARENAL  ABDOMINAL AORTIC ANEURYSM/DISSECTION; MODULAR BIFURCATED PROSTHESIS  2006    AAA repair endovascular     ENT SURGERY       EP ABLATION ATRIAL FLUTTER N/A 4/22/2019    Procedure: EP Ablation Atrial Flutter;  Surgeon: Jessy Vasquez MD;  Location:  HE[facility]ATH LAB     GENITOURINARY SURGERY  6/16/08    radioactive seeding     GRAFT FLAP PEDICLE EXTREMITY (LOCATION) Right 11/12/2019    Procedure: SURGICAL PROCUREMENT, FLAP, PEDICLE, EXTREMITY;  Surgeon: Sara Martinez MD;  Location:  OR     GRAFT SKIN SPLIT THICKNESS FROM EXTREMITY Right 11/12/2019    Procedure: RIGHT GASTRONEMIUS FLAP TO RIGHT KNEE, SPLIT THICKNESS SKIN GRAFT FROM RIGHT THIGH TO RIGHT KNEE, SURGICAL PROCUREMENT, FLAP, PEDICLE, EXTREMITY, WOUND VAC PLACEMENT;  Surgeon: Sara Martinez MD;  Location:  OR     HEAD & NECK SURGERY  1997    vocal cord polypectomy     INCISION AND DRAINAGE KNEE, COMBINED Right 8/29/2019    Procedure: INCISION AND DRAINAGE, KNEE W/ Secondary Wound Closure;  Surgeon: Prasanth Jensen MD;  Location:  OR     IRRIGATION AND DEBRIDEMENT KNEE, PLACE ANTIBIOTIC CEMENT BEADS / SPACE Right 2/12/2020    Procedure: 1. Irrigation and debridement of wound breakdown, right total knee arthroplasty.  2. Irrigation and debridement of right total knee with placement of antibiotic-impregnated (vancomycin) absorbable antibiotic beads.;  Surgeon: Prasanth Jensen MD;  Location: [facility] OR     IRRIGATION AND DEBRIDEMENT LOWER EXTREMITY, COMBINED Right 12/8/2019    Procedure: DEBRIDEMENT OF RIGHT CALF AND WOUND CLOSURE.;  Surgeon: Sara Martinez MD;  Location:  OR     KNEE SURGERY  2001 Right knee arthroscopy     OPTICAL TRACKING SYSTEM FUSION SPINE POSTERIOR LUMBAR THREE+ LEVELS N/A 10/29/2015    Procedure: OPTICAL TRACKING SYSTEM FUSION SPINE POSTERIOR LUMBAR THREE+ LEVELS;  Surgeon: Walt Garcia MD;  Location:  OR     PROSTATE SURGERY      radioactive seeding 6/16/08     REPAIR ANEURYSM  ABDOMINAL AORTA       REPAIR VALVE MITRAL  10/16/2013    SJM 21([id number]):AVR, SJM 27 :MVRProcedure: REPAIR VALVE MITRAL;  REDO STERNOTOMY/REDO AORTIC VALVE REPLACEMENT/ MITRAL VALVE REPLACEMENT/REIMPLANTATION OF RIGHT CORONARY ARTERY BYPASS WITH RACHAEL ( ON PUMP);  Surgeon: Viet Singh MD;  Location:  OR     REPLACE VALVE AORTIC  10/16/2013    Procedure: REPLACE VALVE AORTIC;;  Surgeon: Viet Singh MD;  Location:  OR     SURGERY GENERAL IP CONSULT  2008 Excision aneurysm abdominal aorta     SURGERY GENERAL IP CONSULT   Vocal cord polypectomy     VASCULAR SURGERY  1993     varicose vein stripping       FAMILY HISTORY:  Family History   Problem Relation Age of Onset     No Known Problems Mother      Coronary Artery Disease Father         CABG     Heart Disease Father         Pacemaker     No Known Problems Brother      No Known Problems Sister      No Known Problems Son      Other Cancer Daughter      No Known Problems Daughter      Heart Disease Brother      Other - See Comments Grandchild        SOCIAL HISTORY:  Social History     Socioeconomic History     Marital status:      Spouse name: None     Number of children: None     Years of education: None     Highest education level: None   Occupational History     None   Social Needs     Financial resource strain: None     Food insecurity     Worry: None     Inability: None     Transportation needs     Medical: None     Non-medical: None   Tobacco Use     Smoking status: Former Smoker     Packs/day: 1.00     Years: 40.00     Pack years: 40.00     Start date: 4/15/1962     Quit date: 10/23/2002     Years since quittin.5     Smokeless tobacco: Never Used   Substance and Sexual Activity     Alcohol use: Yes     Frequency: 2-4 times a month     Drinks per session: 1 or 2     Comment: a couple beers per week (socially)     Drug use: No     Sexual activity: Not Currently     Partners: Female   Lifestyle      "Physical activity     Days per week: None     Minutes per session: None     Stress: None   Relationships     Social connections     Talks on phone: None     Gets together: None     Attends Zoroastrian service: None     Active member of club or organization: None     Attends meetings of clubs or organizations: None     Relationship status: None     Intimate partner violence     Fear of current or ex partner: None     Emotionally abused: None     Physically abused: None     Forced sexual activity: None   Other Topics Concern     Parent/sibling w/ CABG, MI or angioplasty before 65F 55M? Yes     Comment: Brother had bypass at 55      Service Not Asked     Blood Transfusions Not Asked     Caffeine Concern No     Comment: 6-8 cups of half and half per day     Occupational Exposure Not Asked     Hobby Hazards Not Asked     Sleep Concern Not Asked     Stress Concern Not Asked     Weight Concern Not Asked     Special Diet No     Back Care Not Asked     Exercise No     Bike Helmet Not Asked     Seat Belt Not Asked     Self-Exams Not Asked   Social History Narrative     None       Review of Systems:  Skin:  Negative     Eyes:  Positive for glasses;cataracts  ENT:  Positive for hearing loss  Respiratory:  Positive for sleep apnea;CPAP;dyspnea on exertion  Cardiovascular:    dizziness;Positive for  Gastroenterology: Negative    Genitourinary:  Negative    Musculoskeletal:  Negative    Neurologic:  Negative    Psychiatric:  Negative    Heme/Lymph/Imm:  Negative    Endocrine:  Negative      Physical Exam:  Vitals: /68 (BP Location: Right arm, Patient Position: Sitting, Cuff Size: Adult Large)   Pulse 96   Ht 1.664 m (5' 5.5\")   Wt 101.6 kg (224 lb)   SpO2 95%   BMI 36.71 kg/m      Constitutional:  cooperative, alert and oriented, well developed, well nourished, in no acute distress        Skin:  warm and dry to the touch, no apparent skin lesions or masses noted          Head:  normocephalic, no masses or lesions  "       Eyes:  pupils equal and round, conjunctivae and lids unremarkable, sclera white, no xanthalasma, EOMS intact, no nystagmus        Lymph:No Cervical lymphadenopathy present;No thyromegaly     ENT:           Neck:  carotid pulses are full and equal bilaterally, JVP normal, no carotid bruit   negative carotid sinus massage    Respiratory:  normal breath sounds, clear to auscultation, normal A-P diameter, normal symmetry, normal respiratory excursion, no use of accessory muscles         Cardiac:   frequent premature beats   crisp prosthetic valve sounds systolic murmur                                            prominant varicose veins jesus alberto LLE    GI:  abdomen soft obese      Extremities and Muscular Skeletal:      bilateral LE edema;trace          Neurological:  no gross motor deficits        Psych:         Recent Lab Results:  LIPID RESULTS:  Lab Results   Component Value Date    CHOL 152 06/01/2020    HDL 34 (L) 06/01/2020    LDL 90 06/01/2020    TRIG 138 06/01/2020    CHOLHDLRATIO 3.6 06/03/2015       LIVER ENZYME RESULTS:  Lab Results   Component Value Date    AST 19 06/01/2020    ALT 16 06/01/2020       CBC RESULTS:  Lab Results   Component Value Date    WBC 7.6 06/15/2020    RBC 4.44 06/15/2020    HGB 13.0 (L) 06/15/2020    HCT 40.0 06/15/2020    MCV 90 06/15/2020    MCH 29.3 06/15/2020    MCHC 32.5 06/15/2020    RDW 13.3 06/15/2020     06/15/2020       BMP RESULTS:  Lab Results   Component Value Date     06/01/2020    POTASSIUM 4.0 06/01/2020    CHLORIDE 106 06/01/2020    CO2 26 06/01/2020    ANIONGAP 4 06/01/2020     (H) 06/01/2020    BUN 13 06/01/2020    CR 0.73 06/01/2020    GFRESTIMATED 72 10/06/2020    GFRESTBLACK 87 10/06/2020    AWILDA 8.5 06/01/2020        A1C RESULTS:  Lab Results   Component Value Date    A1C 5.9 04/25/2017       INR RESULTS:  Lab Results   Component Value Date    INR 2.20 (H) 03/30/2021    INR 2.60 (H) 03/02/2021           CC  ADELE Stephens  CNP  [address]  [place]  [place] [address]

## 2021-04-19 NOTE — PROGRESS NOTES
Service Date: 04/19/2021      PRIMARY CARE PHYSICIAN:  Jodi Flower MD      HISTORY OF PRESENT ILLNESS:  Fausto Farr is a very pleasant 80-year-old gentleman.  He is a patient of mine and he has seen Dr. Vasquez and Dr. Whitman - Dr. Vasquez for EP procedures, Dr. Whitman for venous problems.  I have not seen the patient for 2 years.  He had a remote history of atrial flutter with successful ablation.  He has a very complex cardiac history.  Electrically-speaking, he has, as I mentioned, an atrial flutter ablation.  He has a right bundle branch block, left anterior hemiblock, first-degree AV block and at one point when he was on beta blocker, he had intermittent second-degree Mobitz II.  He saw Dr. Vasquez.  His beta blocker was discontinued at one point because of the AV block and he developed paroxysmal atrial fib.  He was put back on a beta blocker, but then it was recently decreased and then stopped.  Again, the details are unclear.  He just wore a heart monitor for 7 days.  Frequent PVCs and nonsustained V-tach were seen, which is not new, but no heart block was seen, and he is currently off his beta blocker.  His heart rate was adequate.      He does have sleep apnea and he wears a CPAP machine, and he tells me he thinks since the CPAP machine, his irregular heartbeats are better.  He describes dizziness, but it sounds much more like benign positional vertigo.  He states it only occurs when he rolls his head in a certain direction, if he rolls over in bed.  He does not get lightheaded if he stands up, and he is not having paroxysmal episodes of lightheadedness that might suggest AV block or Kaba-Rodríguez attacks, although he is certainly at risk for that.  He has also had stenting and coronary bypass surgery.  His last evaluation of his coronaries was more than 2 years ago and there was no ischemia.  He reports no chest pain problems.  He does not report significant shortness of breath.  He does see Dr. Dobbins from  Pulmonary Medicine and he does use his sleep apnea machine.      He had significant vascular problems.  He sees Dr. Osborne for that.  He had an endovascular AAA repair, but there is a small leak.  He has a known mesenteric artery narrowing, but he does not describe intestinal angina.  He has carotid disease, much of which, however, is left external carotid severe narrowing.  He had VenaSeal closure, sclerotherapy of his left leg, if I understand correctly, but he states it made no difference.  He does not want any further treatment for that.  He still has rather significant varicose veins, but he only has mild bilateral ankle edema.      Today, we reviewed his echocardiogram from last year.  His left ventricular function was low-normal at 50%-55%.  The right ventricle had mildly decreased function, although it was not well seen.  His mitral valve was mechanical, working well, and his aortic valve was mechanical, working well.  The tricuspid valve had mild TR, but there was evidence of mild-plus pulmonary hypertension with a dilated IVC.  The ascending aortic root size was normal.  He was noted to be in sinus rhythm.  His Zio Patch again, as I mentioned, showed no AFib or flutter - no heart block, but nonsustained V-tach and some PVCs; 11% of his beats were PVCs.  Lungs were clear.  Cardiac exam revealed frequent irregular heartbeats.  Good mechanical closure sounds, a soft systolic murmur.  Varicose veins, particularly of the left leg.  Mild bilateral ankle edema.      ASSESSMENT/PLAN:  At this point, with no dizziness suggestive of heart block, no chest pain, no undue shortness of breath, no significant heart failure symptoms, I am not going to make any changes.  I will have him come back next year and we will do a nuclear stress test to follow his coronary arteries, since that will be about 3 years from the last check.  We will repeat the echocardiogram to monitor his pulmonary hypertension and LV function.  I  am going to have him wear a Zio Patch again.  He still is at high risk for AFib recurrence or AV block, and we want to see how many beats are PVC versus V-tach.  He does not feel most of the PVCs.  We will get a 12-lead EKG.  I also reviewed with him the lipid numbers from Dr. Flower.  His LDL cholesterols were quite good 2 years ago when I saw him, but they have gone up.  It turns out because of his extensive recurrent orthopedic problem, xxxxxxxxxxxxx he has gained between 10 and 15 pounds.  He is already on maximum dose Crestor and Zetia.  His LDLs are in the 90s.  I do not think that is high enough to recommend PCSK9 drugs, but I did talk to him about those in detail.  Blood pressure numbers were reviewed today and they were normal.  He does follow up with Dr. Osborne for his endovascular graft and mesenteric narrowings.      Today's visit was 30 minutes, greater than 50% counseling to review all this.      Calderon Schaeffer MD      cc:   Jodi Flower MD   [id number] [place] [address]      Johnnie Osborne MD   [facility]   [address]   [place] [address]      Isrrael Dobbins MD   [facility]   [address]   [place] [address]         CALDERON SCHAEFFER MD             D: 2021   T: 2021   MT: al      Name:     LIANG VAUGHN   MRN:      [id number]        Account:      [id number]   :      1940           Service Date: 2021      Document: [id number]

## 2021-04-20 RX ORDER — TRIAMCINOLONE ACETONIDE 1 MG/G
CREAM TOPICAL 2 TIMES DAILY
Qty: 85.2 G | Refills: 1 | Status: SHIPPED | OUTPATIENT
Start: 2021-04-20 | End: 2021-04-29

## 2021-04-22 ENCOUNTER — ANTICOAGULATION THERAPY VISIT (OUTPATIENT)
Dept: NURSING | Facility: CLINIC | Age: 81
End: 2021-04-22

## 2021-04-22 DIAGNOSIS — Z95.2 S/P AORTIC VALVE REPLACEMENT: ICD-10-CM

## 2021-04-22 DIAGNOSIS — Z95.2 S/P MITRAL VALVE REPLACEMENT: ICD-10-CM

## 2021-04-22 DIAGNOSIS — I48.0 PAROXYSMAL ATRIAL FIBRILLATION (H): ICD-10-CM

## 2021-04-22 DIAGNOSIS — Z79.01 LONG TERM CURRENT USE OF ANTICOAGULANT THERAPY: ICD-10-CM

## 2021-04-22 DIAGNOSIS — I48.91 AF (ATRIAL FIBRILLATION) (H): ICD-10-CM

## 2021-04-22 DIAGNOSIS — I48.19 PERSISTENT ATRIAL FIBRILLATION (H): ICD-10-CM

## 2021-04-22 LAB
CAPILLARY BLOOD COLLECTION: NORMAL
INR PPP: 2.8 (ref 0.86–1.14)

## 2021-04-22 PROCEDURE — 36416 COLLJ CAPILLARY BLOOD SPEC: CPT | Performed by: INTERNAL MEDICINE

## 2021-04-22 PROCEDURE — 85610 PROTHROMBIN TIME: CPT | Performed by: INTERNAL MEDICINE

## 2021-04-22 PROCEDURE — 99207 PR NO CHARGE NURSE ONLY: CPT

## 2021-04-22 NOTE — PROGRESS NOTES
ANTICOAGULATION FOLLOW-UP     Patient Name:  Fausto Farr  Date:  2021  Contact Type:  Telephone    SUBJECTIVE:  Patient Findings     Comments:  The patient was assessed for   diet, medication,   missed or extra doses,   bruising or bleeding,   with no problem findings.  No questions or concerns.  Reviewed previous warfarin dosing with patient.  He took warfarin as instructed.    INR is therapeutic today.   Patient will continue same maintenance dose.   Follow up in 4 weeks.    Going to [facility] on 5/10/21.        Clinical Outcomes     Comments:  The patient was assessed for   diet, medication,   missed or extra doses,   bruising or bleeding,   with no problem findings.  No questions or concerns.  Reviewed previous warfarin dosing with patient.  He took warfarin as instructed.    INR is therapeutic today.   Patient will continue same maintenance dose.   Follow up in 4 weeks.    Going to [facility] on 5/10/21.           OBJECTIVE    Recent labs: (last 7 days)     21  0905   INR 2.80*       ASSESSMENT / PLAN  INR assessment THER    Recheck INR In: 4 WEEKS    INR Location Clinic lab     Anticoagulation Summary  As of 2021    INR goal:  2.5-3.5   TTR:  52.4 % (1 y)   INR used for dosin.80 (2021)   Warfarin maintenance plan:  5 mg (10 mg x 0.5) every Mon, Wed, Fri; 7.5 mg (5 mg x 1.5) all other days   Full warfarin instructions:  5 mg every Mon, Wed, Fri; 7.5 mg all other days   Weekly warfarin total:  45 mg   No change documented:  Caryn Campos RN   Plan last modified:  Ruthann Sanders RN (2021)   Next INR check:  2021   Priority:  Maintenance   Target end date:  Indefinite    Indications    AF (atrial fibrillation) (H) [I48.91]  S/P mitral valve replacement [Z95.2]  Long term current use of anticoagulant therapy [Z79.01]  Persistent atrial fibrillation (H) [I48.19]  S/P aortic valve replacement [Z95.2]              Anticoagulation Episode Summary     INR check location:      Preferred lab:  EXTERNAL LAB    Send INR reminders to:  SHANNA DOWELL    Comments:  Rifampin stopped early June 2020 - will need less warfarin with time.          Anticoagulation Care Providers     Provider Role Specialty Phone number    Jodi Flower Mai, MD Referring Internal Medicine - Pediatrics [phone]            See the Encounter Report to view Anticoagulation Flowsheet and Dosing Calendar (Go to Encounters tab in chart review, and find the Anticoagulation Therapy Visit)        Caryn Campos RN

## 2021-04-23 ENCOUNTER — TELEPHONE (OUTPATIENT)
Dept: PEDIATRICS | Facility: CLINIC | Age: 81
End: 2021-04-23

## 2021-04-23 DIAGNOSIS — L21.9 SEBORRHEIC DERMATITIS: ICD-10-CM

## 2021-04-23 RX ORDER — TRIAMCINOLONE ACETONIDE 1 MG/G
CREAM TOPICAL 2 TIMES DAILY
Qty: 85.2 G | Refills: 1 | Status: CANCELLED | OUTPATIENT
Start: 2021-04-23

## 2021-04-23 NOTE — TELEPHONE ENCOUNTER
Pharmacy Comments    triamcinolone (KENALOG) 0.1 % external cream      Per patient, he needs lotion or solution instead of the cream to be easier for him to apply to his scalp. Please advise.

## 2021-04-28 ENCOUNTER — TELEPHONE (OUTPATIENT)
Dept: PEDIATRICS | Facility: CLINIC | Age: 81
End: 2021-04-28

## 2021-04-28 DIAGNOSIS — L21.9 SEBORRHEIC DERMATITIS: ICD-10-CM

## 2021-04-28 NOTE — TELEPHONE ENCOUNTER
General Call:     Who is calling:  Patient    Reason for Call:  Patient said he talked to [facility] in [place] on [address] and they said betamethasone lotion is not available anymore. He asked if Dr. Flower could send a replacement. It has to be a lotion for his scalp. (he does not use [facility] anymore - please change the pharmacy to [facility])    What are your questions or concerns:  Replace betamethasone with another medication for his scalp.    Date of last appointment with provider: 12/14/20    Okay to leave a detailed message:Yes at Home number on file [phone] (home)

## 2021-04-29 RX ORDER — TRIAMCINOLONE ACETONIDE 1 MG/G
CREAM TOPICAL 2 TIMES DAILY
Qty: 85.2 G | Refills: 1 | Status: SHIPPED | OUTPATIENT
Start: 2021-04-29

## 2021-04-30 DIAGNOSIS — L21.9 SEBORRHEIC DERMATITIS: Primary | ICD-10-CM

## 2021-04-30 NOTE — TELEPHONE ENCOUNTER
Received call from [facility] from [facility] in [place].    Patient requesting a switch from triamcinolone cream to a lotion form.    Routing to Dr. Flower to advise:    Ok to change Triamcinolone from a cream to lotion?  Order Pended.     Route back to [facility] to inform [facility] pharmacy in [place] [phone].    Juan Carlos Parmar RN on 4/30/2021 at 3:39 PM

## 2021-05-03 RX ORDER — TRIAMCINOLONE ACETONIDE 1 MG/ML
LOTION TOPICAL 2 TIMES DAILY
Qty: 60 ML | Refills: 1 | Status: ON HOLD | OUTPATIENT
Start: 2021-05-03 | End: 2023-01-10

## 2021-05-03 NOTE — TELEPHONE ENCOUNTER
New medication has been sent in per medication list.     Staci Davis, RN   [facility] -- Triage Nurse

## 2021-05-20 ENCOUNTER — ANTICOAGULATION THERAPY VISIT (OUTPATIENT)
Dept: NURSING | Facility: CLINIC | Age: 81
End: 2021-05-20

## 2021-05-20 ENCOUNTER — DOCUMENTATION ONLY (OUTPATIENT)
Dept: PEDIATRICS | Facility: CLINIC | Age: 81
End: 2021-05-20

## 2021-05-20 DIAGNOSIS — Z95.2 S/P MITRAL VALVE REPLACEMENT: ICD-10-CM

## 2021-05-20 DIAGNOSIS — I48.91 AF (ATRIAL FIBRILLATION) (H): ICD-10-CM

## 2021-05-20 DIAGNOSIS — Z79.01 LONG TERM CURRENT USE OF ANTICOAGULANT THERAPY: ICD-10-CM

## 2021-05-20 DIAGNOSIS — Z95.2 S/P MITRAL VALVE REPLACEMENT: Primary | ICD-10-CM

## 2021-05-20 DIAGNOSIS — I48.0 PAROXYSMAL ATRIAL FIBRILLATION (H): ICD-10-CM

## 2021-05-20 DIAGNOSIS — I48.91 ATRIAL FIBRILLATION (H): ICD-10-CM

## 2021-05-20 DIAGNOSIS — Z95.2 S/P AORTIC VALVE REPLACEMENT: ICD-10-CM

## 2021-05-20 DIAGNOSIS — I48.19 PERSISTENT ATRIAL FIBRILLATION (H): ICD-10-CM

## 2021-05-20 LAB
CAPILLARY BLOOD COLLECTION: NORMAL
INR PPP: 2.6 (ref 0.86–1.14)

## 2021-05-20 PROCEDURE — 36416 COLLJ CAPILLARY BLOOD SPEC: CPT | Performed by: INTERNAL MEDICINE

## 2021-05-20 PROCEDURE — 99207 PR NO CHARGE NURSE ONLY: CPT

## 2021-05-20 PROCEDURE — 85610 PROTHROMBIN TIME: CPT | Performed by: INTERNAL MEDICINE

## 2021-05-20 NOTE — PROGRESS NOTES
ANTICOAGULATION MANAGEMENT      Fausto Farr due for annual renewal of referral to anticoagulation monitoring. Order pended for your review and signature.      ANTICOAGULATION SUMMARY      Warfarin indication(s)     Atrial fibrillation  Heart Valve Replacement    Heart valve present?  Mechanical  AVR-Bileaflet and Mechanical MVR       Current goal range   INR: 2.5-3.5     Goal appropriate for indication? Yes, INR 2.5-3.5 appropriate for hx  Mechanical MVR, Mechanical AVR with risk factors or older generation (Kevin-Shiley (Tilting disc), Parham-Edmonds (Caged Ball) or Monoleaflet valve)     Current duration of therapy Indefinite/long term therapy   Time in Therapeutic Range (TTR)  (Goal > 60%) 54.7 %       Office visit with referring provider's group within last year yes on 7/24/2020       Caryn Campos RN

## 2021-05-20 NOTE — PROGRESS NOTES
ANTICOAGULATION FOLLOW-UP     Patient Name:  Fausto Farr  Date:  2021  Contact Type:  Telephone    SUBJECTIVE:  Patient Findings     Comments:  The patient was assessed for   diet, medication,   missed or extra doses,   bruising or bleeding,   with no problem findings.  No questions or concerns.  Reviewed previous warfarin dosing with patient.  He took warfarin as instructed.    INR is therapeutic today.   Patient will continue same maintenance dose.   Follow up in 4 weeks.          Clinical Outcomes     Comments:  The patient was assessed for   diet, medication,   missed or extra doses,   bruising or bleeding,   with no problem findings.  No questions or concerns.  Reviewed previous warfarin dosing with patient.  He took warfarin as instructed.    INR is therapeutic today.   Patient will continue same maintenance dose.   Follow up in 4 weeks.             OBJECTIVE    Recent labs: (last 7 days)     21  0851   INR 2.60*       ASSESSMENT / PLAN  INR assessment THER    Recheck INR In: 4 WEEKS    INR Location Clinic lab     Anticoagulation Summary  As of 2021    INR goal:  2.5-3.5   TTR:  54.7 % (1 y)   INR used for dosin.60 (2021)   Warfarin maintenance plan:  5 mg (10 mg x 0.5) every Mon, Wed, Fri; 7.5 mg (5 mg x 1.5) all other days   Full warfarin instructions:  5 mg every Mon, Wed, Fri; 7.5 mg all other days   Weekly warfarin total:  45 mg   No change documented:  Caryn Campos RN   Plan last modified:  Ruthann Sanders RN (2021)   Next INR check:  2021   Priority:  Maintenance   Target end date:  Indefinite    Indications    AF (atrial fibrillation) (H) [I48.91]  S/P mitral valve replacement [Z95.2]  Long term current use of anticoagulant therapy [Z79.01]  Persistent atrial fibrillation (H) [I48.19]  S/P aortic valve replacement [Z95.2]             Anticoagulation Episode Summary     INR check location:      Preferred lab:  [facility]    Send INR reminders to:   SHANNA DOWELL    Comments:  Rifampin stopped early June 2020 - will need less warfarin with time.          Anticoagulation Care Providers     Provider Role Specialty Phone number    Jodi Flower Mai, MD Referring Internal Medicine - Pediatrics [phone]            See the Encounter Report to view Anticoagulation Flowsheet and Dosing Calendar (Go to Encounters tab in chart review, and find the Anticoagulation Therapy Visit)        Caryn Campos RN

## 2021-05-21 DIAGNOSIS — Z95.2 S/P MITRAL VALVE REPLACEMENT: ICD-10-CM

## 2021-05-21 DIAGNOSIS — I48.19 PERSISTENT ATRIAL FIBRILLATION (H): ICD-10-CM

## 2021-05-21 DIAGNOSIS — Z79.01 LONG TERM CURRENT USE OF ANTICOAGULANTS WITH INR GOAL OF 2.5-3.5: ICD-10-CM

## 2021-05-21 DIAGNOSIS — I48.91 AF (ATRIAL FIBRILLATION) (H): ICD-10-CM

## 2021-05-21 DIAGNOSIS — Z95.2 S/P AORTIC VALVE REPLACEMENT: ICD-10-CM

## 2021-05-24 RX ORDER — WARFARIN SODIUM 5 MG/1
TABLET ORAL
Qty: 120 TABLET | Refills: 1 | Status: SHIPPED | OUTPATIENT
Start: 2021-05-24 | End: 2021-10-27

## 2021-05-24 NOTE — TELEPHONE ENCOUNTER
Medication requested:   warfarin 5 MG tablet    Last Written Prescription Date: 7/3/2020  Last Fill Qty: 186, # refills: 0  Last Office Visit with [facility] or [facility] prescribing provider: 7/24/2020     Lab Results   Component Value Date    INR 2.60 05/20/2021    INR 2.80 04/22/2021     Refilled per nurse protocol standing orders.  Caryn Campos RN

## 2021-06-10 ENCOUNTER — TRANSFERRED RECORDS (OUTPATIENT)
Dept: HEALTH INFORMATION MANAGEMENT | Facility: CLINIC | Age: 81
End: 2021-06-10

## 2021-06-17 ENCOUNTER — ANTICOAGULATION THERAPY VISIT (OUTPATIENT)
Dept: NURSING | Facility: CLINIC | Age: 81
End: 2021-06-17

## 2021-06-17 DIAGNOSIS — Z95.2 S/P AORTIC VALVE REPLACEMENT: ICD-10-CM

## 2021-06-17 DIAGNOSIS — I48.19 PERSISTENT ATRIAL FIBRILLATION (H): ICD-10-CM

## 2021-06-17 DIAGNOSIS — Z79.01 LONG TERM CURRENT USE OF ANTICOAGULANT THERAPY: ICD-10-CM

## 2021-06-17 DIAGNOSIS — Z95.2 S/P MITRAL VALVE REPLACEMENT: ICD-10-CM

## 2021-06-17 DIAGNOSIS — I48.91 AF (ATRIAL FIBRILLATION) (H): ICD-10-CM

## 2021-06-17 LAB
CAPILLARY BLOOD COLLECTION: NORMAL
INR PPP: 1.8 (ref 0.86–1.14)

## 2021-06-17 PROCEDURE — 36416 COLLJ CAPILLARY BLOOD SPEC: CPT | Performed by: INTERNAL MEDICINE

## 2021-06-17 PROCEDURE — 99207 PR NO CHARGE NURSE ONLY: CPT

## 2021-06-17 PROCEDURE — 85610 PROTHROMBIN TIME: CPT | Performed by: INTERNAL MEDICINE

## 2021-06-17 NOTE — PROGRESS NOTES
ANTICOAGULATION FOLLOW-UP     Patient Name:  Fausto Farr  Date:  2021  Contact Type:  Telephone    SUBJECTIVE:  Patient Findings     Positives:  Missed doses (Missed doses on  and . Was on vacation.)    Comments:  Per patient:  No increased intake of green vegetables,   No medication changes,   No bleeding/bruising,   No signs/symptoms of a clot.    INR is subtherapeutic today.   Patient will take 12.5 mg today, then resume maintenance dose.   Follow up in 3 weeks. Protocol is 5-7 days, but the earliest he would come back is 3 weeks.                Clinical Outcomes     Comments:  Per patient:  No increased intake of green vegetables,   No medication changes,   No bleeding/bruising,   No signs/symptoms of a clot.    INR is subtherapeutic today.   Patient will take 12.5 mg today, then resume maintenance dose.   Follow up in 3 weeks. Protocol is 5-7 days, but the earliest he would come back is 3 weeks.                   OBJECTIVE    Recent labs: (last 7 days)     21  0905   INR 1.80*       ASSESSMENT / PLAN  INR assessment SUB    Recheck INR In: 3 WEEKS    INR Location Clinic lab     Anticoagulation Summary  As of 2021    INR goal:  2.5-3.5   TTR:  52.4 % (1 y)   INR used for dosin.80 (2021)   Warfarin maintenance plan:  5 mg (10 mg x 0.5) every Mon, Wed, Fri; 7.5 mg (5 mg x 1.5) all other days   Full warfarin instructions:  : 12.5 mg; Otherwise 5 mg every Mon, Wed, Fri; 7.5 mg all other days   Weekly warfarin total:  45 mg   Plan last modified:  Ruthann Sanders RN (2021)   Next INR check:  2021   Priority:  Maintenance   Target end date:  Indefinite    Indications    AF (atrial fibrillation) (H) [I48.91]  S/P mitral valve replacement [Z95.2]  Long term current use of anticoagulant therapy [Z79.01]  Persistent atrial fibrillation (H) [I48.19]  S/P aortic valve replacement [Z95.2]             Anticoagulation Episode Summary     INR check location:      Preferred lab:   [facility]TER[facility]    Send INR reminders to:  SHANNA DOWELL    Comments:  Rifampin stopped early June 2020 - will need less warfarin with time.          Anticoagulation Care Providers     Provider Role Specialty Phone number    Jodi Flower Mai, MD Referring Internal Medicine - Pediatrics [phone]            See the Encounter Report to view Anticoagulation Flowsheet and Dosing Calendar (Go to Encounters tab in chart review, and find the Anticoagulation Therapy Visit)        Caryn Campos RN

## 2021-06-28 ENCOUNTER — HOSPITAL ENCOUNTER (OUTPATIENT)
Facility: CLINIC | Age: 81
End: 2021-06-28
Attending: INTERNAL MEDICINE | Admitting: INTERNAL MEDICINE
Payer: MEDICARE

## 2021-06-28 NOTE — PLAN OF CARE
A/Ox4. Vital signs stable on RA. Has been bradycardic (HR 40s-50s) all day. Mds aware, held AM metoprolol per parameters. Tele showed sinus bradycardia w/ 1 AVB and BBB. Asymptomatic. R thigh small drainage, reinforced w/ tegaderm. Knee immobilizer in place, wvac -75 suction, small output. RON w/ small bloody output. LS clear. BS active. passing gas, no bm. regular diet, denies n/v. Lord with good output. PRN norco for pain. Bedrest until Friday. Okay to turn/repo as tolerated w/ leg straight per Dr Martinez.     Heparin gtt increased to 1350 units/hr this AM. Hep 10a check for 1530. Orders to resume coumadin this evening.          missing some/normal

## 2021-06-29 DIAGNOSIS — Z11.59 ENCOUNTER FOR SCREENING FOR OTHER VIRAL DISEASES: ICD-10-CM

## 2021-07-07 DIAGNOSIS — R33.9 URINARY RETENTION: ICD-10-CM

## 2021-07-08 ENCOUNTER — ANTICOAGULATION THERAPY VISIT (OUTPATIENT)
Dept: NURSING | Facility: CLINIC | Age: 81
End: 2021-07-08

## 2021-07-08 DIAGNOSIS — Z79.01 LONG TERM CURRENT USE OF ANTICOAGULANT THERAPY: ICD-10-CM

## 2021-07-08 DIAGNOSIS — Z95.2 S/P MITRAL VALVE REPLACEMENT: ICD-10-CM

## 2021-07-08 DIAGNOSIS — Z95.2 S/P MITRAL VALVE REPLACEMENT: Primary | ICD-10-CM

## 2021-07-08 DIAGNOSIS — I48.19 PERSISTENT ATRIAL FIBRILLATION (H): ICD-10-CM

## 2021-07-08 DIAGNOSIS — Z95.2 S/P AORTIC VALVE REPLACEMENT: ICD-10-CM

## 2021-07-08 DIAGNOSIS — I48.91 AF (ATRIAL FIBRILLATION) (H): ICD-10-CM

## 2021-07-08 LAB
CAPILLARY BLOOD COLLECTION: NORMAL
INR PPP: 2 (ref 0.86–1.14)

## 2021-07-08 PROCEDURE — 85610 PROTHROMBIN TIME: CPT | Performed by: INTERNAL MEDICINE

## 2021-07-08 PROCEDURE — 36416 COLLJ CAPILLARY BLOOD SPEC: CPT | Performed by: INTERNAL MEDICINE

## 2021-07-08 RX ORDER — TAMSULOSIN HYDROCHLORIDE 0.4 MG/1
CAPSULE ORAL
Qty: 90 CAPSULE | Refills: 0 | Status: SHIPPED | OUTPATIENT
Start: 2021-07-08 | End: 2021-07-26

## 2021-07-08 NOTE — TELEPHONE ENCOUNTER
Pt has appointment scheduled, refill extended  Prescription approved per [facility] Refill Protocol.  Zonia Petty RN, BSN  Message handled by CLINIC NURSE.

## 2021-07-08 NOTE — PROGRESS NOTES
ANTICOAGULATION MANAGEMENT     Fausto Farr 81 year old male is on warfarin with subtherapeutic INR result. (Goal INR 2.5-3.5)    Recent labs: (last 7 days)     07/08/21  1003   INR 2.00*       ASSESSMENT     Source(s): Patient/Caregiver Call       Warfarin doses taken: Pt did miss dose on 7/1; however, he took it the next day plus his regularly scheduled dose    Diet: Increased greens/vitamin K in diet; ongoing change--eating more cucumbers and zucchini, also had broccoli x 1 last week.    New illness, injury, or hospitalization: No    Medication/supplement changes: None noted    Signs or symptoms of bleeding or clotting: No    Previous INR: Subtherapeutic    Additional findings: None     PLAN     Recommended plan for ongoing change(s) affecting INR     Dosing Instructions:  Increase your warfarin dose (11% change) with next INR in 2 weeks       Summary  As of 7/8/2021    Full warfarin instructions:  7/8: 10 mg; Otherwise 5 mg every Wed; 7.5 mg all other days   Next INR check:  7/22/2021             Telephone call with Fausto who verbalizes understanding and agrees to plan    Lab visit scheduled    Education provided: Importance of consistent vitamin K intake    Plan made per [facility] anticoagulation protocol    Aure Samposn, RN  Anticoagulation Clinic  7/8/2021    _______________________________________________________________________     Anticoagulation Episode Summary     Current INR goal:  2.5-3.5   TTR:  51.5 % (1 y)   Target end date:  Indefinite   Send INR reminders to:  SAMAG DELMER    Indications    AF (atrial fibrillation) (H) [I48.91]  S/P mitral valve replacement [Z95.2]  Long term current use of anticoagulant therapy [Z79.01]  Persistent atrial fibrillation (H) [I48.19]  S/P aortic valve replacement [Z95.2]           Comments:  Rifampin stopped early June 2020 - will need less warfarin with time.             Anticoagulation Care Providers     Provider Role Specialty Phone number    Jodi Flower  MD Lucie Referring Internal Medicine - Pediatrics [phone]

## 2021-07-22 ENCOUNTER — LAB (OUTPATIENT)
Dept: LAB | Facility: CLINIC | Age: 81
End: 2021-07-22
Payer: MEDICARE

## 2021-07-22 ENCOUNTER — ANTICOAGULATION THERAPY VISIT (OUTPATIENT)
Dept: ANTICOAGULATION | Facility: CLINIC | Age: 81
End: 2021-07-22

## 2021-07-22 DIAGNOSIS — I48.19 PERSISTENT ATRIAL FIBRILLATION (H): ICD-10-CM

## 2021-07-22 DIAGNOSIS — Z95.2 S/P MITRAL VALVE REPLACEMENT: ICD-10-CM

## 2021-07-22 DIAGNOSIS — I48.91 AF (ATRIAL FIBRILLATION) (H): Primary | ICD-10-CM

## 2021-07-22 DIAGNOSIS — Z79.01 LONG TERM CURRENT USE OF ANTICOAGULANT THERAPY: ICD-10-CM

## 2021-07-22 DIAGNOSIS — Z95.2 S/P AORTIC VALVE REPLACEMENT: ICD-10-CM

## 2021-07-22 LAB — INR BLD: 5.4 (ref 0.9–1.1)

## 2021-07-22 PROCEDURE — 85610 PROTHROMBIN TIME: CPT

## 2021-07-22 PROCEDURE — 36416 COLLJ CAPILLARY BLOOD SPEC: CPT

## 2021-07-22 NOTE — PROGRESS NOTES
Attempted to reach patient by phone.  Left message to call INR Clinic. Caryn [phone]    Caryn Campos RN

## 2021-07-22 NOTE — PROGRESS NOTES
ANTICOAGULATION MANAGEMENT     Fausto Farr 81 year old male is on warfarin with supratherapeutic INR result. (Goal INR 2.5-3.5)    Recent labs: (last 7 days)     07/22/21  0851   INR 5.4*       ASSESSMENT     Source(s): Chart Review and Patient/Caregiver Call       Warfarin doses taken: More warfarin taken than planned which may be affecting INR    Diet: Increased greens/vitamin K in diet; ongoing change. He is eating zucchini and cucumbers from his garden    New illness, injury, or hospitalization: No    Medication/supplement changes: None noted    Signs or symptoms of bleeding or clotting: No    Previous INR: Subtherapeutic    Additional findings: None     PLAN     Recommended plan for temporary change(s) affecting INR     Dosing Instructions: Hold dose then continue your current warfarin dose with next INR in 4 days       Summary  As of 7/22/2021    Full warfarin instructions:  7/22: Hold; Otherwise 5 mg every Wed; 7.5 mg all other days   Next INR check:  7/26/2021             Telephone call with Ralph who verbalizes understanding and agrees to plan    Check at provider office visit on 7/26/21    Education provided: Impact of vitamin K foods on INR, Target INR goal and significance of current INR result, Importance of taking warfarin as instructed and Monitoring for bleeding signs and symptoms    Plan made per [facility] anticoagulation protocol    Caryn Campos RN  Anticoagulation Clinic  7/22/2021    _______________________________________________________________________     Anticoagulation Episode Summary     Current INR goal:  2.5-3.5   TTR:  51.3 % (1 y)   Target end date:  Indefinite   Send INR reminders to:  SHANNA DOWELL    Indications    AF (atrial fibrillation) (H) [I48.91]  S/P mitral valve replacement [Z95.2]  Long term current use of anticoagulant therapy [Z79.01]  Persistent atrial fibrillation (H) [I48.19]  S/P aortic valve replacement [Z95.2]           Comments:  Rifampin stopped early June  2020 - will need less warfarin with time.             Anticoagulation Care Providers     Provider Role Specialty Phone number    Jodi Flower Mai, MD Referring Internal Medicine - Pediatrics [phone]

## 2021-07-26 ENCOUNTER — ANTICOAGULATION THERAPY VISIT (OUTPATIENT)
Dept: ANTICOAGULATION | Facility: CLINIC | Age: 81
End: 2021-07-26

## 2021-07-26 ENCOUNTER — OFFICE VISIT (OUTPATIENT)
Dept: PEDIATRICS | Facility: CLINIC | Age: 81
End: 2021-07-26
Payer: MEDICARE

## 2021-07-26 ENCOUNTER — LAB (OUTPATIENT)
Dept: LAB | Facility: CLINIC | Age: 81
End: 2021-07-26
Payer: MEDICARE

## 2021-07-26 VITALS
HEIGHT: 66 IN | DIASTOLIC BLOOD PRESSURE: 72 MMHG | BODY MASS INDEX: 34.99 KG/M2 | SYSTOLIC BLOOD PRESSURE: 124 MMHG | RESPIRATION RATE: 14 BRPM | WEIGHT: 217.7 LBS | HEART RATE: 91 BPM | OXYGEN SATURATION: 96 % | TEMPERATURE: 97.8 F

## 2021-07-26 DIAGNOSIS — K51.20 ULCERATIVE PROCTITIS WITHOUT COMPLICATION (H): ICD-10-CM

## 2021-07-26 DIAGNOSIS — Z79.01 LONG TERM CURRENT USE OF ANTICOAGULANT THERAPY: ICD-10-CM

## 2021-07-26 DIAGNOSIS — Z95.2 S/P AORTIC VALVE REPLACEMENT: ICD-10-CM

## 2021-07-26 DIAGNOSIS — I50.32 CHRONIC DIASTOLIC CONGESTIVE HEART FAILURE (H): ICD-10-CM

## 2021-07-26 DIAGNOSIS — Z95.2 S/P MITRAL VALVE REPLACEMENT: ICD-10-CM

## 2021-07-26 DIAGNOSIS — E78.2 MIXED HYPERLIPIDEMIA: ICD-10-CM

## 2021-07-26 DIAGNOSIS — I48.19 PERSISTENT ATRIAL FIBRILLATION (H): ICD-10-CM

## 2021-07-26 DIAGNOSIS — Z12.5 ENCOUNTER FOR SCREENING FOR MALIGNANT NEOPLASM OF PROSTATE: ICD-10-CM

## 2021-07-26 DIAGNOSIS — Z00.00 ENCOUNTER FOR MEDICARE ANNUAL WELLNESS EXAM: Primary | ICD-10-CM

## 2021-07-26 DIAGNOSIS — R33.9 URINARY RETENTION: ICD-10-CM

## 2021-07-26 DIAGNOSIS — I48.91 AF (ATRIAL FIBRILLATION) (H): Primary | ICD-10-CM

## 2021-07-26 LAB
ALBUMIN SERPL-MCNC: 4 G/DL (ref 3.4–5)
ALP SERPL-CCNC: 61 U/L (ref 40–150)
ALT SERPL W P-5'-P-CCNC: 30 U/L (ref 0–70)
ANION GAP SERPL CALCULATED.3IONS-SCNC: 4 MMOL/L (ref 3–14)
AST SERPL W P-5'-P-CCNC: 27 U/L (ref 0–45)
BILIRUB SERPL-MCNC: 0.4 MG/DL (ref 0.2–1.3)
BUN SERPL-MCNC: 15 MG/DL (ref 7–30)
CALCIUM SERPL-MCNC: 9.4 MG/DL (ref 8.5–10.1)
CHLORIDE BLD-SCNC: 107 MMOL/L (ref 94–109)
CHOLEST SERPL-MCNC: 137 MG/DL
CO2 SERPL-SCNC: 28 MMOL/L (ref 20–32)
CREAT SERPL-MCNC: 1.1 MG/DL (ref 0.66–1.25)
ERYTHROCYTE [DISTWIDTH] IN BLOOD BY AUTOMATED COUNT: 13.1 % (ref 10–15)
FASTING STATUS PATIENT QL REPORTED: YES
GFR SERPL CREATININE-BSD FRML MDRD: 63 ML/MIN/1.73M2
GLUCOSE BLD-MCNC: 110 MG/DL (ref 70–99)
HCT VFR BLD AUTO: 42.9 % (ref 40–53)
HDLC SERPL-MCNC: 38 MG/DL
HGB BLD-MCNC: 13.7 G/DL (ref 13.3–17.7)
INR BLD: 2.5 (ref 0.9–1.1)
LDLC SERPL CALC-MCNC: 73 MG/DL
MCH RBC QN AUTO: 29.4 PG (ref 26.5–33)
MCHC RBC AUTO-ENTMCNC: 31.9 G/DL (ref 31.5–36.5)
MCV RBC AUTO: 92 FL (ref 78–100)
NONHDLC SERPL-MCNC: 99 MG/DL
PLATELET # BLD AUTO: 285 10E3/UL (ref 150–450)
POTASSIUM BLD-SCNC: 4.8 MMOL/L (ref 3.4–5.3)
PROT SERPL-MCNC: 7.8 G/DL (ref 6.8–8.8)
PSA SERPL-MCNC: <0.01 UG/L (ref 0–4)
RBC # BLD AUTO: 4.66 10E6/UL (ref 4.4–5.9)
SODIUM SERPL-SCNC: 139 MMOL/L (ref 133–144)
TRIGL SERPL-MCNC: 131 MG/DL
WBC # BLD AUTO: 9.5 10E3/UL (ref 4–11)

## 2021-07-26 PROCEDURE — 36415 COLL VENOUS BLD VENIPUNCTURE: CPT | Performed by: INTERNAL MEDICINE

## 2021-07-26 PROCEDURE — 85027 COMPLETE CBC AUTOMATED: CPT | Performed by: INTERNAL MEDICINE

## 2021-07-26 PROCEDURE — 85610 PROTHROMBIN TIME: CPT

## 2021-07-26 PROCEDURE — 80053 COMPREHEN METABOLIC PANEL: CPT | Performed by: INTERNAL MEDICINE

## 2021-07-26 PROCEDURE — 80061 LIPID PANEL: CPT | Performed by: INTERNAL MEDICINE

## 2021-07-26 PROCEDURE — G0103 PSA SCREENING: HCPCS | Performed by: INTERNAL MEDICINE

## 2021-07-26 PROCEDURE — 99214 OFFICE O/P EST MOD 30 MIN: CPT | Mod: 25 | Performed by: INTERNAL MEDICINE

## 2021-07-26 PROCEDURE — G0439 PPPS, SUBSEQ VISIT: HCPCS | Performed by: INTERNAL MEDICINE

## 2021-07-26 RX ORDER — MESALAMINE 800 MG/1
1 TABLET, DELAYED RELEASE ORAL 2 TIMES DAILY
Qty: 180 TABLET | Refills: 3 | Status: SHIPPED | OUTPATIENT
Start: 2021-07-26 | End: 2022-06-23

## 2021-07-26 RX ORDER — FUROSEMIDE 40 MG
20 TABLET ORAL DAILY
Qty: 45 TABLET | Refills: 3 | Status: SHIPPED | OUTPATIENT
Start: 2021-07-26 | End: 2022-08-01

## 2021-07-26 RX ORDER — TAMSULOSIN HYDROCHLORIDE 0.4 MG/1
0.4 CAPSULE ORAL DAILY
Qty: 90 CAPSULE | Refills: 3 | Status: SHIPPED | OUTPATIENT
Start: 2021-07-26 | End: 2022-08-01

## 2021-07-26 RX ORDER — ROSUVASTATIN CALCIUM 40 MG/1
40 TABLET, COATED ORAL DAILY
Qty: 90 TABLET | Refills: 3 | Status: SHIPPED | OUTPATIENT
Start: 2021-07-26 | End: 2022-08-01

## 2021-07-26 RX ORDER — EZETIMIBE 10 MG/1
10 TABLET ORAL DAILY
Qty: 90 TABLET | Refills: 3 | Status: SHIPPED | OUTPATIENT
Start: 2021-07-26 | End: 2022-08-01

## 2021-07-26 ASSESSMENT — MIFFLIN-ST. JEOR: SCORE: 1632.29

## 2021-07-26 NOTE — PROGRESS NOTES
ANTICOAGULATION MANAGEMENT     Fausto Farr 81 year old male is on warfarin with therapeutic INR result. (Goal INR 2.5-3.5)    Recent labs: (last 7 days)     07/26/21  1025   INR 2.5*       ASSESSMENT     Source(s): Chart Review and Patient/Caregiver Call       Warfarin doses taken: Warfarin taken as instructed    Diet: No new diet changes identified    New illness, injury, or hospitalization: No    Medication/supplement changes: None noted    Signs or symptoms of bleeding or clotting: No    Previous INR: Supratherapeutic    Additional findings: None     PLAN     Recommended plan for no diet, medication or health factor changes affecting INR     Dosing Instructions: Continue your current warfarin dose with next INR in 3 weeks       Summary  As of 7/26/2021    Full warfarin instructions:  5 mg every Wed; 7.5 mg all other days   Next INR check:  8/19/2021             Telephone call with  Ralph who verbalizes understanding and agrees to plan    Lab visit scheduled    Education provided: Importance of consistent vitamin K intake and Importance of taking warfarin as instructed    Plan made per [facility] anticoagulation protocol    Caryn Campos RN  Anticoagulation Clinic  7/26/2021    _______________________________________________________________________     Anticoagulation Episode Summary     Current INR goal:  2.5-3.5   TTR:  50.6 % (1 y)   Target end date:  Indefinite   Send INR reminders to:  [facility]    Indications    AF (atrial fibrillation) (H) [I48.91]  S/P mitral valve replacement [Z95.2]  Long term current use of anticoagulant therapy [Z79.01]  Persistent atrial fibrillation (H) [I48.19]  S/P aortic valve replacement [Z95.2]           Comments:  Rifampin stopped early June 2020 - will need less warfarin with time.             Anticoagulation Care Providers     Provider Role Specialty Phone number    Jodi Flower Mai, MD Referring Internal Medicine - Pediatrics [phone]

## 2021-07-26 NOTE — PROGRESS NOTES
Attempted to reach patient by phone.  Left message to call INR Clinic. Caryn [phone]    Caryn Campos RN

## 2021-07-26 NOTE — PROGRESS NOTES
"  SUBJECTIVE:   Fausto Farr is a 81 year old male who presents for Preventive Visit.  Patient has been advised of split billing requirements and indicates understanding: Yes  Are you in the first 12 months of your Medicare Part B coverage?  No    Physical Health:    In general, how would you rate your overall physical health? good    Outside of work, how many days during the week do you exercise? 2-3 days/week    Outside of work, approximately how many minutes a day do you exercise?45-60 minutes    If you drink alcohol do you typically have >3 drinks per day or >7 drinks per week? No    Do you usually eat at least 4 servings of fruit and vegetables a day, include whole grains & fiber and avoid regularly eating high fat or \"junk\" foods? Yes    Do you have any problems taking medications regularly?  No    Do you have any side effects from medications? none    Needs assistance for the following daily activities: no assistance needed    Which of the following safety concerns are present in your home?  throw rugs in the hallway     Hearing impairment: Yes, Difficulty following a conversation in a noisy restaurant or crowded room.    Feel that people are mumbling or not speaking clearly.    Difficulty following dialogue in the theater.    Difficult to understand a speaker at a public meeting or [employer] service.    Difficulty understanding soft or whispered speech.    Difficulty understanding speech on the telephone.   Wears hearing aids when needed.     In the past 6 months, have you been bothered by leaking of urine? no    Mental Health:    In general, how would you rate your overall mental or emotional health? good  PHQ-2 Score:      Do you feel safe in your environment? Yes    Have you ever done Advance Care Planning? (For example, a Health Directive, POLST, or a discussion with a medical provider or your loved ones about your wishes): Yes, advance care planning is on file.    Fall risk:  Fallen 2 or more times " in the past year?: No  Any fall with injury in the past year?: No  Cognitive Screenin) Repeat 3 items (Leader, Season, Table)    2) Clock draw: NORMAL  3) 3 item recall: Recalls 3 objects  Results: 3 items recalled: COGNITIVE IMPAIRMENT LESS LIKELY    Mini-CogTM Copyright S Mamadou. Licensed by the author for use in [facility]; reprinted with permission (sandi@[facility]). All rights reserved.          Reviewed and updated as needed this visit by clinical staff  Tobacco  Allergies  Meds  Problems  Med Hx  Surg Hx  Fam Hx  Soc Hx        Reviewed and updated as needed this visit by Provider   Allergies  Meds  Problems            Social History     Tobacco Use     Smoking status: Former Smoker     Packs/day: 1.00     Years: 40.00     Pack years: 40.00     Start date: 4/15/1962     Quit date: 10/23/2002     Years since quittin.7     Smokeless tobacco: Never Used   Substance Use Topics     Alcohol use: Yes     Comment: a couple beers per week (socially)                        Current providers sharing in care for this patient include:   Patient Care Team:  Jodi Flower Mai, MD as PCP - General (Internal Medicine)  Calderon Santo MD as MD (Cardiology)  Carla Dao, APRN CNP as Nurse Practitioner (Nurse Practitioner)  Walt Garcia MD as MD (Orthopedics)  Isrrael Dobbins MD as Assigned Sleep Provider  Calderon Santo MD as Assigned Heart and Vascular Provider  Best Fountain DO as Assigned PCP    The following health maintenance items are reviewed in Epic and correct as of today:  Health Maintenance   Topic Date Due     URINE DRUG SCREEN  Never done     ZOSTER IMMUNIZATION (2 of 3) 2009     HF ACTION PLAN  2019     PHQ-2  2021     ALT  2021     LIPID  2021     PSA  2021     FALL RISK ASSESSMENT  2021     INFLUENZA VACCINE (1) 2021     BMP  2022     MEDICARE ANNUAL WELLNESS VISIT  2022     ANNUAL REVIEW OF HM ORDERS   "07/26/2022     CBC  07/26/2022     ADVANCE CARE PLANNING  07/26/2026     DTAP/TDAP/TD IMMUNIZATION (4 - Td or Tdap) 09/07/2027     TSH W/FREE T4 REFLEX  Completed     Pneumococcal Vaccine: 65+ Years  Completed     COVID-19 Vaccine  Completed     IPV IMMUNIZATION  Aged Out     MENINGITIS IMMUNIZATION  Aged Out     HEPATITIS B IMMUNIZATION  Aged Out     Lab work is in process  Pneumonia Vaccine:    Adults age 65+ who received Pneumovax (PPSV23) at 65 years or older: Should be given PCV13 > 1 year after their most recent PPSV23   Adults age 65+ who received their first dose of Pneumovax (PPSV23) prior to age 65 years: Should be given PCV 13 > 1 year after their most recent PPSV23 AND should be given a another dose of PPSV23 > 5 years after their most recent dose of PPSV23   Adults age 65+ who have not received previous Pneumovax (PPSV23) or PCV13 as an adult: Should first be given PCV13 AND then should be given PPSV23 6-12 months after PCV13    ROS:  All other systems on a 10-point review are negative, unless otherwise noted in HPI      OBJECTIVE:   /72 (BP Location: Right arm, Patient Position: Chair, Cuff Size: Adult Large)   Pulse 91   Temp 97.8  F (36.6  C) (Tympanic)   Resp 14   Ht 1.664 m (5' 5.5\")   Wt 98.7 kg (217 lb 11.2 oz)   SpO2 96%   BMI 35.68 kg/m   Estimated body mass index is 35.68 kg/m  as calculated from the following:    Height as of this encounter: 1.664 m (5' 5.5\").    Weight as of this encounter: 98.7 kg (217 lb 11.2 oz).  EXAM:   GENERAL: healthy, alert and no distress  EYES: Eyes grossly normal to inspection, PERRL and conjunctivae and sclerae normal  HENT: ear canals and TM's normal, nose and mouth without ulcers or lesions  NECK: no adenopathy, no asymmetry, masses, or scars and thyroid normal to palpation  RESP: lungs clear to auscultation - no rales, rhonchi or wheezes  CV: regular rate and rhythm, normal S1 S2, no S3 or S4, no murmur, click or rub, no peripheral edema and " peripheral pulses strong  ABDOMEN: soft, nontender, no hepatosplenomegaly, no masses and bowel sounds normal  MS: no gross musculoskeletal defects noted, no edema  SKIN: no suspicious lesions or rashes  NEURO: Normal strength and tone, mentation intact and speech normal  PSYCH: mentation appears normal, affect normal/bright    Diagnostic Test Results:  Labs reviewed in Epic    ASSESSMENT / PLAN:   1. Encounter for Medicare annual wellness exam    2. Chronic diastolic congestive heart failure (H)  - Stable, no side effects with medications, refilled meds today  - furosemide (LASIX) 40 MG tablet; Take 0.5 tablets (20 mg) by mouth daily  Dispense: 45 tablet; Refill: 3    3. Mixed hyperlipidemia  LDL has not been at goal.  Recheck today.  Refilled meds.  - Lipid panel reflex to direct LDL Fasting; Future  - ezetimibe (ZETIA) 10 MG tablet; Take 1 tablet (10 mg) by mouth daily  Dispense: 90 tablet; Refill: 3  - rosuvastatin (CRESTOR) 40 MG tablet; Take 1 tablet (40 mg) by mouth daily  Dispense: 90 tablet; Refill: 3  - Comprehensive metabolic panel (BMP + Alb, Alk Phos, ALT, AST, Total. Bili, TP); Future  - Comprehensive metabolic panel (BMP + Alb, Alk Phos, ALT, AST, Total. Bili, TP)  - Lipid panel reflex to direct LDL Fasting    4. 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 BPH  - Stable, no side effects with medications, refilled meds today, recheck PSA  - tamsulosin (FLOMAX) 0.4 MG capsule; Take 1 capsule (0.4 mg) by mouth daily  Dispense: 90 capsule; Refill: 3  - PSA, screen; Future  - PSA, screen    6. Encounter for screening for malignant neoplasm of prostate   - PSA, screen; Future  - PSA, screen    7. Persistent atrial fibrillation (H)  Stable    8. Long term  "current use of anticoagulant therapy  F/up CBC today    9. S/P mitral valve replacement  On warfarin    10. S/P aortic valve replacement  On warfarin      Patient has been advised of split billing requirements and indicates understanding: No    COUNSELING:  Reviewed preventive health counseling, as reflected in patient instructions    Estimated body mass index is 35.68 kg/m  as calculated from the following:    Height as of this encounter: 1.664 m (5' 5.5\").    Weight as of this encounter: 98.7 kg (217 lb 11.2 oz).    Weight management plan: Discussed healthy diet and exercise guidelines    He reports that he quit smoking about 18 years ago. He started smoking about 59 years ago. He has a 40.00 pack-year smoking history. He has never used smokeless tobacco.    Appropriate preventive services were discussed with this patient, including applicable screening as appropriate for cardiovascular disease, diabetes, osteopenia/osteoporosis, and glaucoma.  As appropriate for age/gender, discussed screening for colorectal cancer, prostate cancer, breast cancer, and cervical cancer. Checklist reviewing preventive services available has been given to the patient.    Reviewed patients plan of care and provided an AVS. The Intermediate Care Plan ( asthma action plan, low back pain action plan, and migraine action plan) for Fausto meets the Care Plan requirement. This Care Plan has been established and reviewed with the Patient.    Counseling Resources:  ATP IV Guidelines  Pooled Cohorts Equation Calculator  Breast Cancer Risk Calculator  BRCA-Related Cancer Risk Assessment: FHS-7 Tool  FRAX Risk Assessment  ICSI Preventive Guidelines  Dietary Guidelines for Americans, 2010  [employer]'s MyPlate  ASA Prophylaxis  Lung CA Screening    Chris Flower MD  [facility]  "

## 2021-08-19 ENCOUNTER — LAB (OUTPATIENT)
Dept: LAB | Facility: CLINIC | Age: 81
End: 2021-08-19
Payer: MEDICARE

## 2021-08-19 ENCOUNTER — ANTICOAGULATION THERAPY VISIT (OUTPATIENT)
Dept: ANTICOAGULATION | Facility: CLINIC | Age: 81
End: 2021-08-19

## 2021-08-19 DIAGNOSIS — Z95.2 S/P AORTIC VALVE REPLACEMENT: ICD-10-CM

## 2021-08-19 DIAGNOSIS — Z11.59 ENCOUNTER FOR SCREENING FOR OTHER VIRAL DISEASES: ICD-10-CM

## 2021-08-19 DIAGNOSIS — I48.91 AF (ATRIAL FIBRILLATION) (H): Primary | ICD-10-CM

## 2021-08-19 DIAGNOSIS — Z95.2 S/P MITRAL VALVE REPLACEMENT: ICD-10-CM

## 2021-08-19 DIAGNOSIS — I48.19 PERSISTENT ATRIAL FIBRILLATION (H): ICD-10-CM

## 2021-08-19 DIAGNOSIS — I50.32 CHRONIC DIASTOLIC CONGESTIVE HEART FAILURE (H): ICD-10-CM

## 2021-08-19 DIAGNOSIS — Z79.01 LONG TERM CURRENT USE OF ANTICOAGULANT THERAPY: ICD-10-CM

## 2021-08-19 LAB — INR BLD: 4 (ref 0.9–1.1)

## 2021-08-19 PROCEDURE — 85610 PROTHROMBIN TIME: CPT

## 2021-08-19 PROCEDURE — 36416 COLLJ CAPILLARY BLOOD SPEC: CPT

## 2021-08-19 NOTE — PROGRESS NOTES
ANTICOAGULATION MANAGEMENT     Fausto Farr 81 year old male is on warfarin with supratherapeutic INR result. (Goal INR 2.5-3.5)    Recent labs: (last 7 days)     08/19/21  0850   INR 4.0*       ASSESSMENT     Source(s): Chart Review and Patient/Caregiver Call       Warfarin doses taken: Warfarin taken as instructed    Diet: No new diet changes identified. Continuing to eat vegetables out of his garden.    New illness, injury, or hospitalization: No    Medication/supplement changes: None noted    Signs or symptoms of bleeding or clotting: No    Previous INR: Therapeutic last visit; previously outside of goal range    Additional findings: None     PLAN     Recommended plan for ongoing change(s) affecting INR     Dosing Instructions: Booster dose then Decrease your warfarin dose (5% change) with next INR in 2 weeks       Summary  As of 8/19/2021    Full warfarin instructions:  8/19: 5 mg; Otherwise 5 mg every Sun, Wed; 7.5 mg all other days   Next INR check:  9/2/2021             Telephone call with  Ralph who agrees to plan and repeated back plan correctly    Lab visit scheduled    Education provided: Vitamin K content of foods and Monitoring for bleeding signs and symptoms    Plan made per [facility] anticoagulation protocol    Caryn Campos RN  Anticoagulation Clinic  8/19/2021    _______________________________________________________________________     Anticoagulation Episode Summary     Current INR goal:  2.5-3.5   TTR:  49.1 % (1 y)   Target end date:  Indefinite   Send INR reminders to:  SAMAG DELMER    Indications    AF (atrial fibrillation) (H) [I48.91]  S/P mitral valve replacement [Z95.2]  Long term current use of anticoagulant therapy [Z79.01]  Persistent atrial fibrillation (H) [I48.19]  S/P aortic valve replacement [Z95.2]           Comments:  Rifampin stopped early June 2020 - will need less warfarin with time.             Anticoagulation Care Providers     Provider Role Specialty Phone number     Jodi Flower Mai, MD Referring Internal Medicine - Pediatrics [phone]

## 2021-09-02 ENCOUNTER — ANTICOAGULATION THERAPY VISIT (OUTPATIENT)
Dept: ANTICOAGULATION | Facility: CLINIC | Age: 81
End: 2021-09-02

## 2021-09-02 ENCOUNTER — LAB (OUTPATIENT)
Dept: LAB | Facility: CLINIC | Age: 81
End: 2021-09-02
Payer: MEDICARE

## 2021-09-02 DIAGNOSIS — I48.91 AF (ATRIAL FIBRILLATION) (H): Primary | ICD-10-CM

## 2021-09-02 DIAGNOSIS — Z95.2 S/P AORTIC VALVE REPLACEMENT: ICD-10-CM

## 2021-09-02 DIAGNOSIS — I48.19 PERSISTENT ATRIAL FIBRILLATION (H): ICD-10-CM

## 2021-09-02 DIAGNOSIS — Z79.01 LONG TERM CURRENT USE OF ANTICOAGULANT THERAPY: ICD-10-CM

## 2021-09-02 DIAGNOSIS — Z95.2 S/P MITRAL VALVE REPLACEMENT: ICD-10-CM

## 2021-09-02 LAB — INR BLD: 3 (ref 0.9–1.1)

## 2021-09-02 PROCEDURE — 85610 PROTHROMBIN TIME: CPT

## 2021-09-02 PROCEDURE — 36416 COLLJ CAPILLARY BLOOD SPEC: CPT

## 2021-09-02 NOTE — PROGRESS NOTES
ANTICOAGULATION MANAGEMENT     Fausto Farr 81 year old male is on warfarin with therapeutic INR result. (Goal INR 2.5-3.5)    Recent labs: (last 7 days)     09/02/21  0924   INR 3.0*       ASSESSMENT     Source(s): Chart Review and Patient/Caregiver Call       Warfarin doses taken: Warfarin taken as instructed    Diet: No new diet changes identified    New illness, injury, or hospitalization: No    Medication/supplement changes: None noted    Signs or symptoms of bleeding or clotting: No    Previous INR: Supratherapeutic    Additional findings: He is going to [place] on 10/1/21     PLAN     Recommended plan for no diet, medication or health factor changes affecting INR     Dosing Instructions: Continue your current warfarin dose with next INR in 4 weeks       Summary  As of 9/2/2021    Full warfarin instructions:  5 mg every Sun, Wed; 7.5 mg all other days   Next INR check:  9/30/2021             Telephone call with  Ralph who agrees to plan and repeated back plan correctly    Lab visit scheduled    Education provided: None required    Plan made per [facility] anticoagulation protocol    Caryn Campos RN  Anticoagulation Clinic  9/2/2021    _______________________________________________________________________     Anticoagulation Episode Summary     Current INR goal:  2.5-3.5   TTR:  51.0 % (1 y)   Target end date:  Indefinite   Send INR reminders to:  SHANNA DOWELL    Indications    S/P aortic valve replacement [Z95.2]  S/P mitral valve replacement [Z95.2]  Long term current use of anticoagulant therapy [Z79.01]  Persistent atrial fibrillation (H) [I48.19]           Comments:  Rifampin stopped early June 2020 - will need less warfarin with time.             Anticoagulation Care Providers     Provider Role Specialty Phone number    Jodi Flower Mai, MD Referring Internal Medicine - Pediatrics [phone]

## 2021-09-04 ENCOUNTER — HEALTH MAINTENANCE LETTER (OUTPATIENT)
Age: 81
End: 2021-09-04

## 2021-09-30 ENCOUNTER — LAB (OUTPATIENT)
Dept: LAB | Facility: CLINIC | Age: 81
End: 2021-09-30
Payer: MEDICARE

## 2021-09-30 ENCOUNTER — ANTICOAGULATION THERAPY VISIT (OUTPATIENT)
Dept: ANTICOAGULATION | Facility: CLINIC | Age: 81
End: 2021-09-30

## 2021-09-30 DIAGNOSIS — Z95.2 S/P AORTIC VALVE REPLACEMENT: Primary | ICD-10-CM

## 2021-09-30 DIAGNOSIS — Z95.2 S/P MITRAL VALVE REPLACEMENT: ICD-10-CM

## 2021-09-30 DIAGNOSIS — Z95.2 S/P AORTIC VALVE REPLACEMENT: ICD-10-CM

## 2021-09-30 DIAGNOSIS — I48.19 PERSISTENT ATRIAL FIBRILLATION (H): ICD-10-CM

## 2021-09-30 DIAGNOSIS — Z79.01 LONG TERM CURRENT USE OF ANTICOAGULANT THERAPY: ICD-10-CM

## 2021-09-30 LAB — INR BLD: 2.8 (ref 0.9–1.1)

## 2021-09-30 PROCEDURE — 36415 COLL VENOUS BLD VENIPUNCTURE: CPT

## 2021-09-30 PROCEDURE — 85610 PROTHROMBIN TIME: CPT

## 2021-09-30 NOTE — PROGRESS NOTES
ANTICOAGULATION MANAGEMENT     Fausto Farr 81 year old male is on warfarin with therapeutic INR result. (Goal INR 2.5-3.5)    Recent labs: (last 7 days)     09/30/21  0922   INR 2.8*       ASSESSMENT     Source(s): Chart Review and Patient/Caregiver Call       Warfarin doses taken: Warfarin taken as instructed    Diet: No new diet changes identified    New illness, injury, or hospitalization: No    Medication/supplement changes: None noted    Signs or symptoms of bleeding or clotting: No    Previous INR: Therapeutic last visit; previously outside of goal range    Additional findings: None     PLAN     Recommended plan for no diet, medication or health factor changes affecting INR     Dosing Instructions: Continue your current warfarin dose with next INR in 4 weeks       Summary  As of 9/30/2021    Full warfarin instructions:  5 mg every Sun, Wed; 7.5 mg all other days   Next INR check:  10/28/2021             Telephone call with  Ralph who verbalizes understanding and agrees to plan    Lab visit scheduled    Education provided: Please call back if any changes to your diet, medications or how you've been taking warfarin    Plan made per [facility] anticoagulation protocol    Caryn Campos RN  Anticoagulation Clinic  9/30/2021    _______________________________________________________________________     Anticoagulation Episode Summary     Current INR goal:  2.5-3.5   TTR:  56.5 % (1 y)   Target end date:  Indefinite   Send INR reminders to:  SHANNA DOWELL    Indications    S/P aortic valve replacement [Z95.2]  S/P mitral valve replacement [Z95.2]  Long term current use of anticoagulant therapy [Z79.01]  Persistent atrial fibrillation (H) [I48.19]           Comments:  Rifampin stopped early June 2020 - will need less warfarin with time.             Anticoagulation Care Providers     Provider Role Specialty Phone number    Jodi Flower Mai, MD Referring Internal Medicine - Pediatrics [phone]

## 2021-10-11 ENCOUNTER — HOSPITAL ENCOUNTER (OUTPATIENT)
Dept: CT IMAGING | Facility: CLINIC | Age: 81
Discharge: HOME | End: 2021-10-11
Attending: RADIOLOGY | Admitting: RADIOLOGY
Payer: MEDICARE

## 2021-10-11 DIAGNOSIS — I71.40 ABDOMINAL AORTIC ANEURYSM (AAA) WITHOUT RUPTURE (H): ICD-10-CM

## 2021-10-11 DIAGNOSIS — I71.40 ABDOMINAL AORTIC ANEURYSM (AAA) WITHOUT RUPTURE (H): Primary | ICD-10-CM

## 2021-10-11 LAB
CREAT BLD-MCNC: 0.9 MG/DL (ref 0.7–1.3)
GFR SERPL CREATININE-BSD FRML MDRD: >60 ML/MIN/1.73M2

## 2021-10-11 PROCEDURE — 82565 ASSAY OF CREATININE: CPT

## 2021-10-11 PROCEDURE — G1004 CDSM NDSC: HCPCS

## 2021-10-11 PROCEDURE — 250N000011 HC RX IP 250 OP 636: Performed by: RADIOLOGY

## 2021-10-11 PROCEDURE — 250N000009 HC RX 250: Performed by: RADIOLOGY

## 2021-10-11 RX ORDER — IOPAMIDOL 755 MG/ML
500 INJECTION, SOLUTION INTRAVASCULAR ONCE
Status: COMPLETED | OUTPATIENT
Start: 2021-10-11 | End: 2021-10-11

## 2021-10-11 RX ADMIN — SODIUM CHLORIDE 80 ML: 9 INJECTION, SOLUTION INTRAVENOUS at 08:44

## 2021-10-11 RX ADMIN — IOPAMIDOL 80 ML: 755 INJECTION, SOLUTION INTRAVENOUS at 08:44

## 2021-10-11 NOTE — RESULT ENCOUNTER NOTE
Called patient with results.  Stable, recommend annual follow up.  Patient denies abdominal pain or post prandial pain at this time.  Yesy Rao RN  IR nurse clinician  [phone]

## 2021-10-26 DIAGNOSIS — Z79.01 LONG TERM CURRENT USE OF ANTICOAGULANTS WITH INR GOAL OF 2.5-3.5: ICD-10-CM

## 2021-10-26 DIAGNOSIS — Z95.2 S/P AORTIC VALVE REPLACEMENT: ICD-10-CM

## 2021-10-26 DIAGNOSIS — Z95.2 S/P MITRAL VALVE REPLACEMENT: ICD-10-CM

## 2021-10-26 DIAGNOSIS — I48.19 PERSISTENT ATRIAL FIBRILLATION (H): ICD-10-CM

## 2021-10-27 RX ORDER — WARFARIN SODIUM 5 MG/1
TABLET ORAL
Qty: 120 TABLET | Refills: 1 | Status: SHIPPED | OUTPATIENT
Start: 2021-10-27 | End: 2022-06-14

## 2021-10-27 NOTE — TELEPHONE ENCOUNTER
Component      Latest Ref Rng & Units 8/19/2021 9/2/2021 9/30/2021   INR Point of Care      0.9 - 1.1 4.0 (H) 3.0 (H) 2.8 (H)         Current warfarin dose per last [facility] note: 5 mg every Sun, Wed; 7.5 mg all other days    Last OV with responsible provider's group: 7/26/21    Patient is up to date.   Refilled per protocol.

## 2021-10-28 ENCOUNTER — LAB (OUTPATIENT)
Dept: LAB | Facility: CLINIC | Age: 81
End: 2021-10-28
Payer: MEDICARE

## 2021-10-28 ENCOUNTER — ANTICOAGULATION THERAPY VISIT (OUTPATIENT)
Dept: ANTICOAGULATION | Facility: CLINIC | Age: 81
End: 2021-10-28

## 2021-10-28 DIAGNOSIS — Z79.01 LONG TERM CURRENT USE OF ANTICOAGULANT THERAPY: ICD-10-CM

## 2021-10-28 DIAGNOSIS — Z95.2 S/P MITRAL VALVE REPLACEMENT: ICD-10-CM

## 2021-10-28 DIAGNOSIS — I48.19 PERSISTENT ATRIAL FIBRILLATION (H): ICD-10-CM

## 2021-10-28 DIAGNOSIS — Z95.2 S/P AORTIC VALVE REPLACEMENT: Primary | ICD-10-CM

## 2021-10-28 DIAGNOSIS — Z95.2 S/P AORTIC VALVE REPLACEMENT: ICD-10-CM

## 2021-10-28 LAB — INR BLD: 3 (ref 0.9–1.1)

## 2021-10-28 PROCEDURE — 36416 COLLJ CAPILLARY BLOOD SPEC: CPT

## 2021-10-28 PROCEDURE — 85610 PROTHROMBIN TIME: CPT

## 2021-10-28 NOTE — PROGRESS NOTES
ANTICOAGULATION MANAGEMENT     Fausto Farr 81 year old male is on warfarin with therapeutic INR result. (Goal INR 2.5-3.5)    Recent labs: (last 7 days)     10/28/21  0920   INR 3.0*       ASSESSMENT     Source(s): Chart Review and Patient/Caregiver Call       Warfarin doses taken: Warfarin taken as instructed    Diet: No new diet changes identified    New illness, injury, or hospitalization: No    Medication/supplement changes: None noted    Signs or symptoms of bleeding or clotting: No    Previous INR: Therapeutic last 2(+) visits    Additional findings: None     PLAN     Recommended plan for no diet, medication or health factor changes affecting INR     Dosing Instructions: Continue your current warfarin dose with next INR in 5 weeks       Summary  As of 10/28/2021    Full warfarin instructions:  5 mg every Sun, Wed; 7.5 mg all other days   Next INR check:  12/2/2021             Telephone call with  Ralph who agrees to plan and repeated back plan correctly    Lab visit scheduled    Education provided: Contact [phone]  with any changes, questions or concerns.     Plan made per [facility] anticoagulation protocol    Caryn Campos RN  Anticoagulation Clinic  10/28/2021    _______________________________________________________________________     Anticoagulation Episode Summary     Current INR goal:  2.5-3.5   TTR:  60.6 % (1 y)   Target end date:  Indefinite   Send INR reminders to:  SHANNA DOWELL    Indications    S/P aortic valve replacement [Z95.2]  S/P mitral valve replacement [Z95.2]  Long term current use of anticoagulant therapy [Z79.01]  Persistent atrial fibrillation (H) [I48.19]           Comments:  Rifampin stopped early June 2020 - will need less warfarin with time.             Anticoagulation Care Providers     Provider Role Specialty Phone number    Jodi Flower Mai, MD Referring Internal Medicine - Pediatrics [phone]

## 2021-11-11 ENCOUNTER — TELEPHONE (OUTPATIENT)
Dept: PEDIATRICS | Facility: CLINIC | Age: 81
End: 2021-11-11
Payer: MEDICARE

## 2021-11-11 DIAGNOSIS — R21 RASH: Primary | ICD-10-CM

## 2021-11-11 RX ORDER — FLUOCINONIDE 0.5 MG/G
OINTMENT TOPICAL 2 TIMES DAILY
Qty: 60 G | Refills: 11 | Status: SHIPPED | OUTPATIENT
Start: 2021-11-11 | End: 2023-10-24

## 2021-11-11 NOTE — TELEPHONE ENCOUNTER
Patient called in to [facility] direct line.     Requesting a refill of Fluocinonide Ointment, 60 g.  This med is not on active med list, but is in historic meds.     Gets a rash every once in a while.  Had a rash on the chest last week, and this ointment helped.   Has been using it for years. Uses this med very sparingly.     Denies having any symptoms currently.   Wants to have this medication on hand to treat occasional rashes.     Preferred pharmacy:  [facility] in [facility].  Fax: [phone].  Phone: [phone].    Routing to Dr. Flower:  -ok for a script of Fluocinonide ointment?  Pended.  Will need to be faxed to [facility] pharmacy in [facility].     Juan Carlos Parmar RN on 11/11/2021 at 9:48 AM

## 2021-12-02 ENCOUNTER — LAB (OUTPATIENT)
Dept: LAB | Facility: CLINIC | Age: 81
End: 2021-12-02
Payer: MEDICARE

## 2021-12-02 ENCOUNTER — ANTICOAGULATION THERAPY VISIT (OUTPATIENT)
Dept: ANTICOAGULATION | Facility: CLINIC | Age: 81
End: 2021-12-02

## 2021-12-02 DIAGNOSIS — Z95.2 S/P AORTIC VALVE REPLACEMENT: Primary | ICD-10-CM

## 2021-12-02 DIAGNOSIS — Z95.2 S/P MITRAL VALVE REPLACEMENT: ICD-10-CM

## 2021-12-02 DIAGNOSIS — Z79.01 LONG TERM CURRENT USE OF ANTICOAGULANT THERAPY: ICD-10-CM

## 2021-12-02 DIAGNOSIS — I48.19 PERSISTENT ATRIAL FIBRILLATION (H): ICD-10-CM

## 2021-12-02 DIAGNOSIS — Z95.2 S/P AORTIC VALVE REPLACEMENT: ICD-10-CM

## 2021-12-02 LAB — INR BLD: 5.3 (ref 0.9–1.1)

## 2021-12-02 PROCEDURE — 36416 COLLJ CAPILLARY BLOOD SPEC: CPT

## 2021-12-02 PROCEDURE — 85610 PROTHROMBIN TIME: CPT

## 2021-12-02 NOTE — PROGRESS NOTES
ANTICOAGULATION MANAGEMENT     Fausto Farr 81 year old male is on warfarin with supratherapeutic INR result. (Goal INR 2.5-3.5)    Recent labs: (last 7 days)     12/02/21  0916   INR 5.3*       ASSESSMENT     Source(s): Chart Review and Patient/Caregiver Call       Warfarin doses taken: Missed dose(s) may be affecting INR, but he made it up the next morning.    Diet: No new diet changes identified    New illness, injury, or hospitalization: Yes: he pulled a muscle in his neck. Pain for the past week. Is better now.    Medication/supplement changes: Took tylenol 2 tabs in am and 2 tabs at bedtime for the past week. Is not taking any now.    Signs or symptoms of bleeding or clotting: No    Previous INR: Therapeutic last 2(+) visits    Additional findings: None     PLAN     Recommended plan for temporary change(s) affecting INR     Dosing Instructions: Hold dose then continue your current warfarin dose with next INR in 4 days.  He had a one-time INR of 5.4 in July. Held one dose. Recheck 4 days later, INR was 2.5      Summary  As of 12/2/2021    Full warfarin instructions:  12/2: Hold; Otherwise 5 mg every Sun, Wed; 7.5 mg all other days   Next INR check:  12/6/2021             Telephone call with  Ralph who agrees to plan and repeated back plan correctly    Lab visit scheduled    Education provided: Monitoring for bleeding signs and symptoms and Contact [phone]  with any changes, questions or concerns.     Plan made per [facility] anticoagulation protocol   Encounter routed to PCP for notification of high INR.    Caryn Campos RN  Anticoagulation Clinic  12/2/2021    _______________________________________________________________________     Anticoagulation Episode Summary     Current INR goal:  2.5-3.5   TTR:  55.8 % (1 y)   Target end date:  Indefinite   Send INR reminders to:  SHANNA DOWELL    Indications    S/P aortic valve replacement [Z95.2]  S/P mitral valve replacement [Z95.2]  Long term current use  of anticoagulant therapy [Z79.01]  Persistent atrial fibrillation (H) [I48.19]           Comments:  Rifampin stopped early June 2020 - will need less warfarin with time.             Anticoagulation Care Providers     Provider Role Specialty Phone number    Jodi Flower Mai, MD Referring Internal Medicine - Pediatrics [phone]

## 2021-12-06 ENCOUNTER — LAB (OUTPATIENT)
Dept: LAB | Facility: CLINIC | Age: 81
End: 2021-12-06
Payer: MEDICARE

## 2021-12-06 ENCOUNTER — ANTICOAGULATION THERAPY VISIT (OUTPATIENT)
Dept: ANTICOAGULATION | Facility: CLINIC | Age: 81
End: 2021-12-06

## 2021-12-06 DIAGNOSIS — I48.19 PERSISTENT ATRIAL FIBRILLATION (H): ICD-10-CM

## 2021-12-06 DIAGNOSIS — Z95.2 S/P MITRAL VALVE REPLACEMENT: ICD-10-CM

## 2021-12-06 DIAGNOSIS — Z95.2 S/P AORTIC VALVE REPLACEMENT: Primary | ICD-10-CM

## 2021-12-06 DIAGNOSIS — Z79.01 LONG TERM CURRENT USE OF ANTICOAGULANT THERAPY: ICD-10-CM

## 2021-12-06 DIAGNOSIS — Z95.2 S/P AORTIC VALVE REPLACEMENT: ICD-10-CM

## 2021-12-06 LAB — INR BLD: 2.9 (ref 0.9–1.1)

## 2021-12-06 PROCEDURE — 85610 PROTHROMBIN TIME: CPT

## 2021-12-06 PROCEDURE — 36416 COLLJ CAPILLARY BLOOD SPEC: CPT

## 2021-12-06 NOTE — PROGRESS NOTES
ANTICOAGULATION MANAGEMENT     Fausto Farr 81 year old male is on warfarin with therapeutic INR result. (Goal INR 2.5-3.5)    Recent labs: (last 7 days)     12/06/21  0920   INR 2.9*       ASSESSMENT     Source(s): Chart Review and Patient/Caregiver Call       Warfarin doses taken: Warfarin taken as instructed    Diet: No new diet changes identified    New illness, injury, or hospitalization: No    Medication/supplement changes: None noted    Signs or symptoms of bleeding or clotting: No    Previous INR: Supratherapeutic    Additional findings: None     PLAN     Recommended plan for no diet, medication or health factor changes affecting INR     Dosing Instructions: Continue your current warfarin dose with next INR in 4 weeks       Summary  As of 12/6/2021    Full warfarin instructions:  5 mg every Sun, Wed; 7.5 mg all other days   Next INR check:  1/3/2022             Telephone call with  Ralph who agrees to plan and repeated back plan correctly    Lab visit scheduled    Education provided: Contact [phone]  with any changes, questions or concerns.     Plan made per [facility] anticoagulation protocol    Caryn Campos RN  Anticoagulation Clinic  12/6/2021    _______________________________________________________________________     Anticoagulation Episode Summary     Current INR goal:  2.5-3.5   TTR:  56.1 % (1 y)   Target end date:  Indefinite   Send INR reminders to:  SHANNA DOWELL    Indications    S/P aortic valve replacement [Z95.2]  S/P mitral valve replacement [Z95.2]  Long term current use of anticoagulant therapy [Z79.01]  Persistent atrial fibrillation (H) [I48.19]           Comments:  Rifampin stopped early June 2020 - will need less warfarin with time.             Anticoagulation Care Providers     Provider Role Specialty Phone number    Jodi Flower Mai, MD Referring Internal Medicine - Pediatrics [phone]

## 2021-12-25 ENCOUNTER — HEALTH MAINTENANCE LETTER (OUTPATIENT)
Age: 81
End: 2021-12-25

## 2022-01-06 ENCOUNTER — ANTICOAGULATION THERAPY VISIT (OUTPATIENT)
Dept: ANTICOAGULATION | Facility: CLINIC | Age: 82
End: 2022-01-06

## 2022-01-06 ENCOUNTER — LAB (OUTPATIENT)
Dept: LAB | Facility: CLINIC | Age: 82
End: 2022-01-06
Payer: MEDICARE

## 2022-01-06 DIAGNOSIS — Z95.2 S/P MITRAL VALVE REPLACEMENT: ICD-10-CM

## 2022-01-06 DIAGNOSIS — R73.02 GLUCOSE INTOLERANCE (IMPAIRED GLUCOSE TOLERANCE): ICD-10-CM

## 2022-01-06 DIAGNOSIS — I48.19 PERSISTENT ATRIAL FIBRILLATION (H): ICD-10-CM

## 2022-01-06 DIAGNOSIS — Z79.01 LONG TERM CURRENT USE OF ANTICOAGULANT THERAPY: ICD-10-CM

## 2022-01-06 DIAGNOSIS — Z95.2 S/P AORTIC VALVE REPLACEMENT: Primary | ICD-10-CM

## 2022-01-06 DIAGNOSIS — Z95.2 S/P AORTIC VALVE REPLACEMENT: ICD-10-CM

## 2022-01-06 DIAGNOSIS — I10 ESSENTIAL HYPERTENSION, BENIGN: ICD-10-CM

## 2022-01-06 LAB
ANION GAP SERPL CALCULATED.3IONS-SCNC: 7 MMOL/L (ref 3–14)
BUN SERPL-MCNC: 17 MG/DL (ref 7–30)
CALCIUM SERPL-MCNC: 8.9 MG/DL (ref 8.5–10.1)
CHLORIDE BLD-SCNC: 106 MMOL/L (ref 94–109)
CO2 SERPL-SCNC: 26 MMOL/L (ref 20–32)
CREAT SERPL-MCNC: 1.12 MG/DL (ref 0.66–1.25)
GFR SERPL CREATININE-BSD FRML MDRD: 66 ML/MIN/1.73M2
GLUCOSE BLD-MCNC: 163 MG/DL (ref 70–99)
HBA1C MFR BLD: 6.5 % (ref 0–5.6)
INR BLD: 4 (ref 0.9–1.1)
POTASSIUM BLD-SCNC: 3.8 MMOL/L (ref 3.4–5.3)
SODIUM SERPL-SCNC: 139 MMOL/L (ref 133–144)

## 2022-01-06 PROCEDURE — 80048 BASIC METABOLIC PNL TOTAL CA: CPT

## 2022-01-06 PROCEDURE — 85610 PROTHROMBIN TIME: CPT

## 2022-01-06 PROCEDURE — 36415 COLL VENOUS BLD VENIPUNCTURE: CPT

## 2022-01-06 PROCEDURE — 83036 HEMOGLOBIN GLYCOSYLATED A1C: CPT

## 2022-01-06 NOTE — PROGRESS NOTES
ANTICOAGULATION MANAGEMENT     Fausto Farr 81 year old male is on warfarin with supratherapeutic INR result. (Goal INR 2.5-3.5)    Recent labs: (last 7 days)     01/06/22  0916   INR 4.0*       ASSESSMENT     Source(s): Chart Review and Patient/Caregiver Call       Warfarin doses taken: Warfarin taken as instructed    Diet: Decreased greens/vitamin K in diet; plans to resume previous intake    New illness, injury, or hospitalization: No    Medication/supplement changes: None noted    Signs or symptoms of bleeding or clotting: No    Previous INR: Therapeutic last visit; previously outside of goal range    Additional findings: Patient reports he will be going to [place] and [place] for 3 weeks, leaving 2/20/22     PLAN     Recommended plan for temporary change(s) affecting INR. If patient's INR continues to be supratherapeutic at next INR check, consider decreasing warfarin maintenance dose.     Dosing Instructions: Partial hold then continue your current warfarin dose with next INR in 2 weeks       Summary  As of 1/6/2022    Full warfarin instructions:  1/6: 5 mg; Otherwise 5 mg every Sun, Wed; 7.5 mg all other days   Next INR check:  1/19/2022             Telephone call with Fausto who verbalizes understanding and agrees to plan    Lab visit scheduled    Education provided: Please call back if any changes to your diet, medications or how you've been taking warfarin and Contact [phone]  with any changes, questions or concerns.     Plan made per [facility] anticoagulation protocol    Cece Rios RN  Anticoagulation Clinic  1/6/2022    _______________________________________________________________________     Anticoagulation Episode Summary     Current INR goal:  2.5-3.5   TTR:  58.9 % (1 y)   Target end date:  Indefinite   Send INR reminders to:  SHANNA DOWELL    Indications    S/P aortic valve replacement [Z95.2]  S/P mitral valve replacement [Z95.2]  Long term current use of anticoagulant therapy  [Z79.01]  Persistent atrial fibrillation (H) [I48.19]           Comments:  Rifampin stopped early June 2020 - will need less warfarin with time.             Anticoagulation Care Providers     Provider Role Specialty Phone number    Jodi Flower Mai, MD Referring Internal Medicine - Pediatrics [phone]

## 2022-01-07 ENCOUNTER — TELEPHONE (OUTPATIENT)
Dept: PEDIATRICS | Facility: CLINIC | Age: 82
End: 2022-01-07
Payer: MEDICARE

## 2022-01-07 NOTE — RESULT ENCOUNTER NOTE
"Please call - A1C is technically in the \"diabetes\" range (which is 6.5% or greater).  I recommend he schedule a follow-up to discuss this diagnosis, treatment options, and monitoring parameters/goals.  I believe we can try to manage his diabetes with dietary changes alone.    Chris Flower MD    "

## 2022-01-07 NOTE — TELEPHONE ENCOUNTER
Called patient.   Relayed below note from Dr. Flower to the patient.     Patient verbalized understanding.     Visit with Dr. Flower scheduled for 1/13/22.    Juan Carlos Parmar RN on 1/7/2022 at 12:04 PM

## 2022-01-07 NOTE — TELEPHONE ENCOUNTER
"----- Message from Chris Flower MD sent at 1/7/2022 11:37 AM CST -----  Please call - A1C is technically in the \"diabetes\" range (which is 6.5% or greater).  I recommend he schedule a follow-up to discuss this diagnosis, treatment options, and monitoring parameters/goals.  I believe we can try to manage his diabetes with dietary changes alone.    Chris Flower MD      "

## 2022-01-13 ENCOUNTER — OFFICE VISIT (OUTPATIENT)
Dept: PEDIATRICS | Facility: CLINIC | Age: 82
End: 2022-01-13
Payer: MEDICARE

## 2022-01-13 VITALS
WEIGHT: 223 LBS | HEART RATE: 55 BPM | SYSTOLIC BLOOD PRESSURE: 128 MMHG | OXYGEN SATURATION: 96 % | TEMPERATURE: 97.5 F | BODY MASS INDEX: 36.54 KG/M2 | DIASTOLIC BLOOD PRESSURE: 64 MMHG

## 2022-01-13 DIAGNOSIS — C61 MALIGNANT NEOPLASM OF PROSTATE (H): ICD-10-CM

## 2022-01-13 DIAGNOSIS — E11.9 TYPE 2 DIABETES MELLITUS WITHOUT COMPLICATION, WITHOUT LONG-TERM CURRENT USE OF INSULIN (H): Primary | ICD-10-CM

## 2022-01-13 DIAGNOSIS — I50.32 CHRONIC DIASTOLIC CONGESTIVE HEART FAILURE (H): ICD-10-CM

## 2022-01-13 DIAGNOSIS — K51.20 ULCERATIVE PROCTITIS WITHOUT COMPLICATION (H): ICD-10-CM

## 2022-01-13 DIAGNOSIS — I48.19 PERSISTENT ATRIAL FIBRILLATION (H): ICD-10-CM

## 2022-01-13 DIAGNOSIS — E66.01 MORBID OBESITY (H): ICD-10-CM

## 2022-01-13 DIAGNOSIS — I73.9 PVD (PERIPHERAL VASCULAR DISEASE) (H): ICD-10-CM

## 2022-01-13 PROBLEM — M00.9 SEPTIC JOINT (H): Status: RESOLVED | Noted: 2020-02-10 | Resolved: 2022-01-13

## 2022-01-13 PROBLEM — M00.80: Status: RESOLVED | Noted: 2020-02-14 | Resolved: 2022-01-13

## 2022-01-13 PROCEDURE — 99214 OFFICE O/P EST MOD 30 MIN: CPT | Performed by: INTERNAL MEDICINE

## 2022-01-13 NOTE — PATIENT INSTRUCTIONS
DIABETES MANAGEMENT: INSTRUCTIONS    Diabetes management GOALS:  HbA1c less than 8%, measured at least every 3 months (6 once you are at goal)  BP less than 130/80  Annual microalbumin (small protein) level in urine  Annual lipids with goal of LDL less than 70  Annual eye exam  Annual foot exam     RETURN to clinic:  In 6 months for ANNUAL CHECK-UP AND DIABETES FOLLOW-UP    Medication changes:  None    Referrals:  Diabetes Educations

## 2022-01-13 NOTE — PROGRESS NOTES
"  Assessment & Plan     Type 2 diabetes mellitus without complication, without long-term current use of insulin (H)  New diagnosis - see PI - we spent some time discussing this and surveillance  Will diet control for now  - [facility] Adult Diabetes Educator Referral; Future    Chronic diastolic congestive heart failure (H)  Sees cardiology once a year  Stopped lisinopril for dizziness  - ACE/ARB/ARNI NOT PRESCRIBED (INTENTIONAL); Please choose reason not prescribed from choices below.    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    Persistent atrial fibrillation (H)  Continues anticoagulation    Morbid obesity (H)  Discussed lifestyle modifications including diet.    Malignant neoplasm of prostate (H)  Sees urology        I spent a total of 30 minutes on the day of the visit.   Time spent doing chart review, history and exam, documentation and further activities per the note       BMI:   Estimated body mass index is 36.54 kg/m  as calculated from the following:    Height as of 7/26/21: 1.664 m (5' 5.5\").    Weight as of this encounter: 101.2 kg (223 lb).   Weight management plan: Discussed healthy diet and exercise guidelines    Patient Instructions     DIABETES MANAGEMENT: INSTRUCTIONS    Diabetes management GOALS:  HbA1c less than 8%, measured at least every 3 months (6 once you are at goal)  BP less than 130/80  Annual microalbumin (small protein) level in urine  Annual lipids with goal of LDL less than 70  Annual eye exam  Annual foot exam     RETURN to clinic:  In 6 months for ANNUAL CHECK-UP AND DIABETES FOLLOW-UP    Medication changes:  None    Referrals:  Diabetes Educations      Return in about 6 months (around 7/13/2022) for Preventive " Visit plus diabetes.    Chris Flower MD  [facility] DELMER Rosas is a 81 year old who presents for the following health issues     HPI     Diabetes Follow-up (does not currently have diabetes, just concerned about getting there)      How often are you checking your blood sugar? Not at all    What concerns do you have today about your diabetes? None     Do you have any of these symptoms? (Select all that apply)  No numbness or tingling in feet.  No redness, sores or blisters on feet.  No complaints of excessive thirst.  No reports of blurry vision.  No significant changes to weight.        Hyperlipidemia Follow-Up      Are you regularly taking any medication or supplement to lower your cholesterol?   Yes- ezetimibe and rosuvastatin    Are you having muscle aches or other side effects that you think could be caused by your cholesterol lowering medication?  No    Hypertension Follow-up      Do you check your blood pressure regularly outside of the clinic? No     Are you following a low salt diet? No    Are your blood pressures ever more than 140 on the top number (systolic) OR more   than 90 on the bottom number (diastolic), for example 140/90? No    BP Readings from Last 2 Encounters:   01/13/22 128/64   07/26/21 124/72     Hemoglobin A1C POCT (%)   Date Value   04/25/2017 5.9   10/19/2013 6.8 (H)     Hemoglobin A1C (%)   Date Value   01/06/2022 6.5 (H)     LDL Cholesterol Calculated (mg/dL)   Date Value   07/26/2021 73   06/01/2020 90   05/30/2019 92           Review of Systems   All other systems on a 10-point review are negative, unless otherwise noted in HPI        Objective    /64 (BP Location: Right arm, Patient Position: Chair, Cuff Size: Adult Regular)   Pulse 55   Temp 97.5  F (36.4  C) (Tympanic)   Wt 101.2 kg (223 lb)   SpO2 96%   BMI 36.54 kg/m    Body mass index is 36.54 kg/m .  Physical Exam   GENERAL: healthy, alert and no distress  EYES: Eyes grossly normal  to inspection  NECK: no asymmetry, masses, or scars  MS: no gross musculoskeletal defects noted, no edema  SKIN: no suspicious lesions or rashes  NEURO: mentation intact and speech normal  PSYCH: mentation appears normal and affect normal/bright

## 2022-01-19 ENCOUNTER — TELEPHONE (OUTPATIENT)
Dept: PEDIATRICS | Facility: CLINIC | Age: 82
End: 2022-01-19
Payer: MEDICARE

## 2022-01-19 NOTE — TELEPHONE ENCOUNTER
Diabetes Education Scheduling Outreach #1:    Call to patient to schedule. Left message with phone number to call to schedule.    Plan for 2nd outreach attempt within 1 week.    Jesi Soni  [facility]  Diabetes and Nutrition Scheduling

## 2022-01-20 ENCOUNTER — ANTICOAGULATION THERAPY VISIT (OUTPATIENT)
Dept: ANTICOAGULATION | Facility: CLINIC | Age: 82
End: 2022-01-20

## 2022-01-20 ENCOUNTER — LAB (OUTPATIENT)
Dept: LAB | Facility: CLINIC | Age: 82
End: 2022-01-20
Payer: MEDICARE

## 2022-01-20 DIAGNOSIS — I48.19 PERSISTENT ATRIAL FIBRILLATION (H): ICD-10-CM

## 2022-01-20 DIAGNOSIS — Z95.2 S/P MITRAL VALVE REPLACEMENT: ICD-10-CM

## 2022-01-20 DIAGNOSIS — Z79.01 LONG TERM CURRENT USE OF ANTICOAGULANT THERAPY: ICD-10-CM

## 2022-01-20 DIAGNOSIS — Z95.2 S/P AORTIC VALVE REPLACEMENT: Primary | ICD-10-CM

## 2022-01-20 DIAGNOSIS — Z95.2 S/P AORTIC VALVE REPLACEMENT: ICD-10-CM

## 2022-01-20 LAB — INR BLD: 2.4 (ref 0.9–1.1)

## 2022-01-20 PROCEDURE — 85610 PROTHROMBIN TIME: CPT

## 2022-01-20 PROCEDURE — 36416 COLLJ CAPILLARY BLOOD SPEC: CPT

## 2022-01-21 NOTE — TELEPHONE ENCOUNTER
Diabetes Education Scheduling Outreach #2:    Call to patient to schedule. Patient declined to schedule.    Jesi Soni  [facility]  Diabetes and Nutrition Scheduling

## 2022-01-24 DIAGNOSIS — G47.33 OBSTRUCTIVE SLEEP APNEA (ADULT) (PEDIATRIC): Primary | ICD-10-CM

## 2022-02-03 ENCOUNTER — ANTICOAGULATION THERAPY VISIT (OUTPATIENT)
Dept: ANTICOAGULATION | Facility: CLINIC | Age: 82
End: 2022-02-03

## 2022-02-03 ENCOUNTER — LAB (OUTPATIENT)
Dept: LAB | Facility: CLINIC | Age: 82
End: 2022-02-03
Payer: MEDICARE

## 2022-02-03 DIAGNOSIS — Z95.2 S/P MITRAL VALVE REPLACEMENT: ICD-10-CM

## 2022-02-03 DIAGNOSIS — I48.19 PERSISTENT ATRIAL FIBRILLATION (H): ICD-10-CM

## 2022-02-03 DIAGNOSIS — Z95.2 S/P AORTIC VALVE REPLACEMENT: ICD-10-CM

## 2022-02-03 DIAGNOSIS — Z95.2 S/P AORTIC VALVE REPLACEMENT: Primary | ICD-10-CM

## 2022-02-03 DIAGNOSIS — Z79.01 LONG TERM CURRENT USE OF ANTICOAGULANT THERAPY: ICD-10-CM

## 2022-02-03 LAB — INR BLD: 3.1 (ref 0.9–1.1)

## 2022-02-03 PROCEDURE — 36416 COLLJ CAPILLARY BLOOD SPEC: CPT

## 2022-02-03 PROCEDURE — 85610 PROTHROMBIN TIME: CPT

## 2022-02-03 NOTE — PROGRESS NOTES
ANTICOAGULATION MANAGEMENT     Fausto Farr 81 year old male is on warfarin with therapeutic INR result. (Goal INR 2.5-3.5)    Recent labs: (last 7 days)     02/03/22  1119   INR 3.1*       ASSESSMENT     Source(s): Chart Review and Patient/Caregiver Call       Warfarin doses taken: Warfarin taken as instructed    Diet: No new diet changes identified    New illness, injury, or hospitalization: No    Medication/supplement changes: None noted    Signs or symptoms of bleeding or clotting: No    Previous INR: Subtherapeutic    Additional findings: patient will be leaving on 2/20 for 3 weeks.  Will have next INR done prior to his trip.     PLAN     Recommended plan for no diet, medication or health factor changes affecting INR     Dosing Instructions: Continue your current warfarin dose with next INR in 2 weeks       Summary  As of 2/3/2022    Full warfarin instructions:  5 mg every Sun, Wed; 7.5 mg all other days   Next INR check:  2/17/2022             Telephone call with Fausto who agrees to plan and repeated back plan correctly    Lab visit scheduled    Education provided: Please call back if any changes to your diet, medications or how you've been taking warfarin    Plan made per [facility] anticoagulation protocol    Ruthann Sanders RN  Anticoagulation Clinic  2/3/2022    _______________________________________________________________________     Anticoagulation Episode Summary     Current INR goal:  2.5-3.5   TTR:  57.0 % (1 y)   Target end date:  Indefinite   Send INR reminders to:  SHANNA DOWELL    Indications    S/P aortic valve replacement [Z95.2]  S/P mitral valve replacement [Z95.2]  Long term current use of anticoagulant therapy [Z79.01]  Persistent atrial fibrillation (H) [I48.19]           Comments:           Anticoagulation Care Providers     Provider Role Specialty Phone number    Jodi Flower Mai, MD Referring Internal Medicine - Pediatrics [phone]

## 2022-02-03 NOTE — PROGRESS NOTES
Left message to patient to call INR Clinic to discuss results.  No changes in dominick or medications seen since last INR check.    Patient will be leaving for 3 weeks on 2/20/22.

## 2022-02-17 ENCOUNTER — ANTICOAGULATION THERAPY VISIT (OUTPATIENT)
Dept: ANTICOAGULATION | Facility: CLINIC | Age: 82
End: 2022-02-17

## 2022-02-17 ENCOUNTER — LAB (OUTPATIENT)
Dept: LAB | Facility: CLINIC | Age: 82
End: 2022-02-17
Payer: MEDICARE

## 2022-02-17 DIAGNOSIS — I48.19 PERSISTENT ATRIAL FIBRILLATION (H): ICD-10-CM

## 2022-02-17 DIAGNOSIS — Z95.2 S/P AORTIC VALVE REPLACEMENT: ICD-10-CM

## 2022-02-17 DIAGNOSIS — Z95.2 S/P MITRAL VALVE REPLACEMENT: ICD-10-CM

## 2022-02-17 DIAGNOSIS — Z95.2 S/P AORTIC VALVE REPLACEMENT: Primary | ICD-10-CM

## 2022-02-17 DIAGNOSIS — Z79.01 LONG TERM CURRENT USE OF ANTICOAGULANT THERAPY: ICD-10-CM

## 2022-02-17 LAB — INR BLD: 3.2 (ref 0.9–1.1)

## 2022-02-17 PROCEDURE — 36416 COLLJ CAPILLARY BLOOD SPEC: CPT

## 2022-02-17 PROCEDURE — 85610 PROTHROMBIN TIME: CPT

## 2022-02-17 NOTE — PROGRESS NOTES
ANTICOAGULATION MANAGEMENT     Fausto Farr 81 year old male is on warfarin with therapeutic INR result. (Goal INR 2.5-3.5)    Recent labs: (last 7 days)     02/17/22  0910   INR 3.2*       ASSESSMENT     Source(s): Chart Review and Patient/Caregiver Call       Warfarin doses taken: Warfarin taken as instructed    Diet: No new diet changes identified    New illness, injury, or hospitalization: No    Medication/supplement changes: None noted    Signs or symptoms of bleeding or clotting: No    Previous INR: Therapeutic last visit; previously outside of goal range    Additional findings: Leaving on 2/20/22 to travel for 3 weeks.  Will have next INR done upon his return.     PLAN     Recommended plan for no diet, medication or health factor changes affecting INR     Dosing Instructions: Continue your current warfarin dose with next INR in 4 weeks       Summary  As of 2/17/2022    Full warfarin instructions:  5 mg every Sun, Wed; 7.5 mg all other days   Next INR check:  3/17/2022             Telephone call with Fausto who agrees to plan and repeated back plan correctly    Lab visit scheduled    Education provided: Please call back if any changes to your diet, medications or how you've been taking warfarin    Plan made per [facility] anticoagulation protocol    Ruthann Sanders RN  Anticoagulation Clinic  2/17/2022    _______________________________________________________________________     Anticoagulation Episode Summary     Current INR goal:  2.5-3.5   TTR:  57.0 % (1 y)   Target end date:  Indefinite   Send INR reminders to:  SHANNA DOWELL    Indications    S/P aortic valve replacement [Z95.2]  S/P mitral valve replacement [Z95.2]  Long term current use of anticoagulant therapy [Z79.01]  Persistent atrial fibrillation (H) [I48.19]           Comments:           Anticoagulation Care Providers     Provider Role Specialty Phone number    Jodi Flower Mai, MD Referring Internal Medicine - Pediatrics [phone]

## 2022-03-17 ENCOUNTER — ANTICOAGULATION THERAPY VISIT (OUTPATIENT)
Dept: ANTICOAGULATION | Facility: CLINIC | Age: 82
End: 2022-03-17

## 2022-03-17 ENCOUNTER — LAB (OUTPATIENT)
Dept: LAB | Facility: CLINIC | Age: 82
End: 2022-03-17
Payer: MEDICARE

## 2022-03-17 DIAGNOSIS — I48.19 PERSISTENT ATRIAL FIBRILLATION (H): ICD-10-CM

## 2022-03-17 DIAGNOSIS — Z95.2 S/P MITRAL VALVE REPLACEMENT: ICD-10-CM

## 2022-03-17 DIAGNOSIS — Z95.2 S/P AORTIC VALVE REPLACEMENT: ICD-10-CM

## 2022-03-17 DIAGNOSIS — Z95.2 S/P AORTIC VALVE REPLACEMENT: Primary | ICD-10-CM

## 2022-03-17 DIAGNOSIS — Z79.01 LONG TERM CURRENT USE OF ANTICOAGULANT THERAPY: ICD-10-CM

## 2022-03-17 LAB — INR BLD: 3.2 (ref 0.9–1.1)

## 2022-03-17 PROCEDURE — 36416 COLLJ CAPILLARY BLOOD SPEC: CPT

## 2022-03-17 PROCEDURE — 85610 PROTHROMBIN TIME: CPT

## 2022-03-17 NOTE — PROGRESS NOTES
ANTICOAGULATION MANAGEMENT     Fausto Farr 81 year old male is on warfarin with therapeutic INR result. (Goal INR 2.5-3.5)    Recent labs: (last 7 days)     03/17/22  0847   INR 3.2*       ASSESSMENT       Source(s): Chart Review and Patient/Caregiver Call       Warfarin doses taken: Warfarin taken as instructed    Diet: No new diet changes identified    New illness, injury, or hospitalization: No    Medication/supplement changes: None noted    Signs or symptoms of bleeding or clotting: No    Previous INR: Therapeutic last 2(+) visits    Additional findings: None       PLAN     Recommended plan for no diet, medication or health factor changes affecting INR     Dosing Instructions: Continue your current warfarin dose with next INR in 5 weeks       Summary  As of 3/17/2022    Full warfarin instructions:  5 mg every Sun, Wed; 7.5 mg all other days   Next INR check:  4/21/2022             Telephone call with Fausto who verbalizes understanding and agrees to plan    Lab visit scheduled    Education provided: Goal range and significance of current result and Importance of therapeutic range    Plan made per [facility] anticoagulation protocol    Jung Louise RN  Anticoagulation Clinic  3/17/2022    _______________________________________________________________________     Anticoagulation Episode Summary     Current INR goal:  2.5-3.5   TTR:  59.0 % (1 y)   Target end date:  Indefinite   Send INR reminders to:  SHANNA DOWELL    Indications    S/P aortic valve replacement [Z95.2]  S/P mitral valve replacement [Z95.2]  Long term current use of anticoagulant therapy [Z79.01]  Persistent atrial fibrillation (H) [I48.19]           Comments:           Anticoagulation Care Providers     Provider Role Specialty Phone number    Jodi Flower Mai, MD Referring Internal Medicine - Pediatrics [phone]

## 2022-03-17 NOTE — PROGRESS NOTES
ANTICOAGULATION MANAGEMENT     Fausto Farr 81 year old male is on warfarin with therapeutic INR result. (Goal INR 2.5-3.5)    Recent labs: (last 7 days)     03/17/22  0847   INR 3.2*       ASSESSMENT       Source(s): Chart Review    Previous INR was Therapeutic last 2(+) visits    Medication, diet, health changes since last INR chart reviewed; none identified           PLAN     Recommended plan for no diet, medication or health factor changes affecting INR     Dosing Instructions: Continue your current warfarin dose with next INR in 5 weeks       Summary  As of 3/17/2022    Full warfarin instructions:  5 mg every Sun, Wed; 7.5 mg all other days   Next INR check:  4/21/2022             Detailed voice message left for Fausto with dosing instructions and follow up date.     Contact [phone]  to schedule and with any changes, questions or concerns.     Education provided: Please call back if any changes to your diet, medications or how you've been taking warfarin    Plan made per [facility] anticoagulation protocol    Jung Louise RN  Anticoagulation Clinic  3/17/2022    _______________________________________________________________________     Anticoagulation Episode Summary     Current INR goal:  2.5-3.5   TTR:  59.0 % (1 y)   Target end date:  Indefinite   Send INR reminders to:  SHANNA DOWELL    Indications    S/P aortic valve replacement [Z95.2]  S/P mitral valve replacement [Z95.2]  Long term current use of anticoagulant therapy [Z79.01]  Persistent atrial fibrillation (H) [I48.19]           Comments:           Anticoagulation Care Providers     Provider Role Specialty Phone number    Jodi Flower Mai, MD Referring Internal Medicine - Pediatrics [phone]

## 2022-04-05 ENCOUNTER — TRANSFERRED RECORDS (OUTPATIENT)
Dept: HEALTH INFORMATION MANAGEMENT | Facility: CLINIC | Age: 82
End: 2022-04-05
Payer: MEDICARE

## 2022-04-16 ENCOUNTER — HEALTH MAINTENANCE LETTER (OUTPATIENT)
Age: 82
End: 2022-04-16

## 2022-04-21 ENCOUNTER — ANTICOAGULATION THERAPY VISIT (OUTPATIENT)
Dept: ANTICOAGULATION | Facility: CLINIC | Age: 82
End: 2022-04-21

## 2022-04-21 ENCOUNTER — DOCUMENTATION ONLY (OUTPATIENT)
Dept: ANTICOAGULATION | Facility: CLINIC | Age: 82
End: 2022-04-21

## 2022-04-21 ENCOUNTER — LAB (OUTPATIENT)
Dept: LAB | Facility: CLINIC | Age: 82
End: 2022-04-21
Payer: MEDICARE

## 2022-04-21 DIAGNOSIS — Z95.2 S/P MITRAL VALVE REPLACEMENT: ICD-10-CM

## 2022-04-21 DIAGNOSIS — Z79.01 LONG TERM CURRENT USE OF ANTICOAGULANT THERAPY: ICD-10-CM

## 2022-04-21 DIAGNOSIS — I48.91 ATRIAL FIBRILLATION (H): ICD-10-CM

## 2022-04-21 DIAGNOSIS — I48.19 PERSISTENT ATRIAL FIBRILLATION (H): ICD-10-CM

## 2022-04-21 DIAGNOSIS — Z95.1 S/P CABG (CORONARY ARTERY BYPASS GRAFT): ICD-10-CM

## 2022-04-21 DIAGNOSIS — Z79.01 LONG TERM CURRENT USE OF ANTICOAGULANT THERAPY: Primary | ICD-10-CM

## 2022-04-21 DIAGNOSIS — Z95.2 S/P AORTIC VALVE REPLACEMENT: ICD-10-CM

## 2022-04-21 DIAGNOSIS — Z95.2 S/P AORTIC VALVE REPLACEMENT: Primary | ICD-10-CM

## 2022-04-21 LAB — INR BLD: 3.5 (ref 0.9–1.1)

## 2022-04-21 PROCEDURE — 36416 COLLJ CAPILLARY BLOOD SPEC: CPT

## 2022-04-21 PROCEDURE — 85610 PROTHROMBIN TIME: CPT

## 2022-04-21 NOTE — PROGRESS NOTES
ANTICOAGULATION MANAGEMENT      Fausto Alli [facility] due for annual renewal of referral to anticoagulation monitoring. Order pended for your review and signature.      ANTICOAGULATION SUMMARY      Warfarin indication(s)     Heart Valve Replacement    Heart valve present?  Bioprosthetic AVR and Mechanical MVR       Current goal range   INR: 2.5-3.5     Goal appropriate for indication? Yes, INR 2.5-3.5 appropriate for hx  Mechanical MVR, Mechanical AVR with risk factors or older generation (Kevin-Shiley (Tilting disc), Parham-Edmonds (Caged Ball) or Monoleaflet valve)     Current duration of therapy Indefinite/long term therapy   Time in Therapeutic Range (TTR)  (Goal > 60%) 65.8%       Office visit with referring provider's group within last year yes on 1/13/2022       Cece Rios RN

## 2022-04-21 NOTE — PROGRESS NOTES
ANTICOAGULATION MANAGEMENT     Fausto Farr 81 year old male is on warfarin with therapeutic INR result. (Goal INR 2.5-3.5)    Recent labs: (last 7 days)     04/21/22  0926   INR 3.5*       ASSESSMENT       Source(s): Chart Review and Patient/Caregiver Call       Warfarin doses taken: Warfarin taken as instructed    Diet: Increased greens/vitamin K in diet; plans to resume previous intake, patient and spouse reports he has had more broccoli than usual this past week    New illness, injury, or hospitalization: No    Medication/supplement changes: None noted    Signs or symptoms of bleeding or clotting: No    Previous INR: Therapeutic last 2(+) visits    Additional findings: None       PLAN     Recommended plan for no diet, medication or health factor changes affecting INR. Patient is requesting to decrease weekly warfarin maintenance dose by 2.5 mg since INR seems to be increasing     Dosing Instructions: decrease your warfarin dose (5.3% change) with next INR in 3 weeks. Suggested to recheck INR in 2 weeks, patient wanted 3 weeks       Summary  As of 4/21/2022    Full warfarin instructions:  5 mg every Sun, Wed, Fri; 7.5 mg all other days   Next INR check:  5/12/2022             Telephone call with Ralph who verbalizes understanding and agrees to plan    Lab visit scheduled    Education provided: Please call back if any changes to your diet, medications or how you've been taking warfarin and Contact [phone]  with any changes, questions or concerns.     Plan made per [facility] anticoagulation protocol    Cece Rios RN  Anticoagulation Clinic  4/21/2022    _______________________________________________________________________     Anticoagulation Episode Summary     Current INR goal:  2.5-3.5   TTR:  65.8 % (1 y)   Target end date:  Indefinite   Send INR reminders to:  SHANNA DOWELL    Indications    S/P aortic valve replacement [Z95.2]  S/P mitral valve replacement [Z95.2]  Long term current use of  anticoagulant therapy [Z79.01]  Persistent atrial fibrillation (H) [I48.19]           Comments:           Anticoagulation Care Providers     Provider Role Specialty Phone number    Jodi Flower Mai, MD Referring Internal Medicine - Pediatrics [phone]

## 2022-04-21 NOTE — PROGRESS NOTES
ANTICOAGULATION MANAGEMENT     Fausto Farr 81 year old male is on warfarin with therapeutic INR result. (Goal INR 2.5-3.5)    Recent labs: (last 7 days)     04/21/22  0926   INR 3.5*       ASSESSMENT       Source(s): Chart Review    Previous INR was Therapeutic last 2(+) visits    Medication, diet, health changes since last INR chart reviewed; none identified           PLAN     Unable to reach Ralph today.    Left message for patient to return call to [phone]    Follow up required to confirm warfarin dose taken and assess for changes. INR is at the top of INR goal range, higher than usual for the patient.       Cece Rios RN  Anticoagulation Clinic  4/21/2022     no weight-bearing restrictions

## 2022-05-12 ENCOUNTER — ANTICOAGULATION THERAPY VISIT (OUTPATIENT)
Dept: ANTICOAGULATION | Facility: CLINIC | Age: 82
End: 2022-05-12

## 2022-05-12 ENCOUNTER — LAB (OUTPATIENT)
Dept: LAB | Facility: CLINIC | Age: 82
End: 2022-05-12
Payer: MEDICARE

## 2022-05-12 DIAGNOSIS — I48.19 PERSISTENT ATRIAL FIBRILLATION (H): ICD-10-CM

## 2022-05-12 DIAGNOSIS — Z79.01 LONG TERM CURRENT USE OF ANTICOAGULANT THERAPY: ICD-10-CM

## 2022-05-12 DIAGNOSIS — Z95.2 S/P MITRAL VALVE REPLACEMENT: ICD-10-CM

## 2022-05-12 DIAGNOSIS — Z95.2 S/P AORTIC VALVE REPLACEMENT: Primary | ICD-10-CM

## 2022-05-12 DIAGNOSIS — Z95.2 S/P AORTIC VALVE REPLACEMENT: ICD-10-CM

## 2022-05-12 LAB — INR BLD: 3.4 (ref 0.9–1.1)

## 2022-05-12 PROCEDURE — 85610 PROTHROMBIN TIME: CPT

## 2022-05-12 PROCEDURE — 36415 COLL VENOUS BLD VENIPUNCTURE: CPT

## 2022-05-12 NOTE — PROGRESS NOTES
ANTICOAGULATION MANAGEMENT     Fausto Farr 82 year old male is on warfarin with therapeutic INR result. (Goal INR 2.5-3.5)    Recent labs: (last 7 days)     05/12/22  0900   INR 3.4*       ASSESSMENT       Source(s): Chart Review and Patient/Caregiver Call       Warfarin doses taken: More warfarin taken than planned which may be affecting INR--patient only followed dose decrease x 1 week then went back to taking just 2 days of 5mg.    Diet: Inconsistent greens, pt agrees to try to eat at least 2 servings per week    New illness, injury, or hospitalization: No    Medication/supplement changes: None noted    Signs or symptoms of bleeding or clotting: No    Previous INR: Therapeutic last 2(+) visits    Additional findings: I increased warfarin MD back up to reflect what pt has taken over the past 2 weeks. INR is in range, has a mechanical mitral valve, no issues with bleeding or bruising and agrees to try to eat greens.       PLAN     Recommended plan for temporary change(s) affecting INR     Dosing Instructions: Increase your warfarin dose (5% change) with next INR in 2 weeks but patient declined, agreed to come back in 4 weeks.       Summary  As of 5/12/2022    Full warfarin instructions:  5 mg every Sun, Wed; 7.5 mg all other days   Next INR check:  6/9/2022             Telephone call with Ralph who agrees to plan and repeated back plan correctly    Lab visit scheduled    Education provided: Importance of consistent vitamin K intake, Impact of vitamin K foods on INR, Vitamin K content of foods and Contact [phone]  with any changes, questions or concerns.     Plan made per [facility] anticoagulation protocol    Aure Sampson, RN  Anticoagulation Clinic  5/12/2022    _______________________________________________________________________     Anticoagulation Episode Summary     Current INR goal:  2.5-3.5   TTR:  65.8 % (1 y)   Target end date:  Indefinite   Send INR reminders to:  SHANNA DOWELL    Indications     S/P aortic valve replacement [Z95.2]  S/P mitral valve replacement [Z95.2]  Long term current use of anticoagulant therapy [Z79.01]  Persistent atrial fibrillation (H) [I48.19]           Comments:           Anticoagulation Care Providers     Provider Role Specialty Phone number    Jodi Flower Mai, MD Referring Internal Medicine - Pediatrics [phone]

## 2022-05-12 NOTE — PROGRESS NOTES
ANTICOAGULATION MANAGEMENT     Fausto Farr 82 year old male is on warfarin with therapeutic INR result. (Goal INR 2.5-3.5)    Recent labs: (last 7 days)     05/12/22  0900   INR 3.4*       ASSESSMENT       Source(s): Chart Review    Previous INR was Therapeutic last 2(+) visits    Medication, diet, health changes since last INR--warfarin MD was reduced 5% on 4/21/22.           PLAN     Unable to reach Ralph today.    Left  to continue SAME DOSE and to call [phone] with transfer to Aure Looney:[phone] OR coni syd      Follow up required to confirm warfarin dose taken and assess for changes    Aure Sampson, JUSTIN  Anticoagulation Clinic  5/12/2022

## 2022-06-09 ENCOUNTER — ANTICOAGULATION THERAPY VISIT (OUTPATIENT)
Dept: ANTICOAGULATION | Facility: CLINIC | Age: 82
End: 2022-06-09

## 2022-06-09 ENCOUNTER — LAB (OUTPATIENT)
Dept: LAB | Facility: CLINIC | Age: 82
End: 2022-06-09
Payer: MEDICARE

## 2022-06-09 DIAGNOSIS — Z79.01 LONG TERM CURRENT USE OF ANTICOAGULANT THERAPY: ICD-10-CM

## 2022-06-09 DIAGNOSIS — Z95.2 S/P MITRAL VALVE REPLACEMENT: ICD-10-CM

## 2022-06-09 DIAGNOSIS — Z95.2 S/P AORTIC VALVE REPLACEMENT: Primary | ICD-10-CM

## 2022-06-09 DIAGNOSIS — I48.19 PERSISTENT ATRIAL FIBRILLATION (H): ICD-10-CM

## 2022-06-09 DIAGNOSIS — Z95.2 S/P AORTIC VALVE REPLACEMENT: ICD-10-CM

## 2022-06-09 LAB — INR BLD: 2.3 (ref 0.9–1.1)

## 2022-06-09 PROCEDURE — 36416 COLLJ CAPILLARY BLOOD SPEC: CPT

## 2022-06-09 PROCEDURE — 85610 PROTHROMBIN TIME: CPT

## 2022-06-09 NOTE — PROGRESS NOTES
ANTICOAGULATION MANAGEMENT     Fausto Farr 82 year old male is on warfarin with subtherapeutic INR result. (Goal INR 2.5-3.5)    Recent labs: (last 7 days)     06/09/22  0824   INR 2.3*       ASSESSMENT       Source(s): Chart Review and Patient/Caregiver Call       Warfarin doses taken: Missed dose(s) may be affecting INR    Diet: No new diet changes identified    New illness, injury, or hospitalization: No    Medication/supplement changes: None noted    Signs or symptoms of bleeding or clotting: No    Previous INR: Therapeutic last 2(+) visits    Additional findings: None       PLAN     Recommended plan for temporary change(s) affecting INR     Dosing Instructions: booster dose then continue your current warfarin dose with next INR in 2 weeks; however, patient declined and requested 3 weeks.       Summary  As of 6/9/2022    Full warfarin instructions:  6/9: 10 mg; Otherwise 5 mg every Sun, Wed; 7.5 mg all other days   Next INR check:  6/30/2022             Telephone call with Ralph who verbalizes understanding and agrees to plan    Lab visit scheduled    Education provided: Contact [phone]  with any changes, questions or concerns.     Plan made per [facility] anticoagulation protocol    Aure Sampson, RN  Anticoagulation Clinic  6/9/2022    _______________________________________________________________________     Anticoagulation Episode Summary     Current INR goal:  2.5-3.5   TTR:  68.9 % (1 y)   Target end date:  Indefinite   Send INR reminders to:  SHANNA DOWELL    Indications    S/P aortic valve replacement [Z95.2]  S/P mitral valve replacement [Z95.2]  Long term current use of anticoagulant therapy [Z79.01]  Persistent atrial fibrillation (H) [I48.19]           Comments:           Anticoagulation Care Providers     Provider Role Specialty Phone number    Jodi Flower Mai, MD Referring Internal Medicine - Pediatrics [phone]

## 2022-06-13 DIAGNOSIS — Z95.2 S/P AORTIC VALVE REPLACEMENT: ICD-10-CM

## 2022-06-13 DIAGNOSIS — Z95.2 S/P MITRAL VALVE REPLACEMENT: ICD-10-CM

## 2022-06-13 DIAGNOSIS — Z79.01 LONG TERM CURRENT USE OF ANTICOAGULANTS WITH INR GOAL OF 2.5-3.5: ICD-10-CM

## 2022-06-13 DIAGNOSIS — I48.19 PERSISTENT ATRIAL FIBRILLATION (H): ICD-10-CM

## 2022-06-14 RX ORDER — WARFARIN SODIUM 5 MG/1
TABLET ORAL
Qty: 120 TABLET | Refills: 1 | Status: SHIPPED | OUTPATIENT
Start: 2022-06-14 | End: 2022-12-15

## 2022-06-14 NOTE — TELEPHONE ENCOUNTER
ANTICOAGULATION MANAGEMENT:  Medication Refill    Anticoagulation Summary  As of 6/9/2022    Warfarin maintenance plan:  5 mg (5 mg x 1) every Sun, Wed; 7.5 mg (5 mg x 1.5) all other days   Next INR check:  6/30/2022   Target end date:  Indefinite    Indications    S/P aortic valve replacement [Z95.2]  S/P mitral valve replacement [Z95.2]  Long term current use of anticoagulant therapy [Z79.01]  Persistent atrial fibrillation (H) [I48.19]             Anticoagulation Care Providers     Provider Role Specialty Phone number    Jodi Flower Mai, MD Referring Internal Medicine - Pediatrics [phone]          Visit with referring provider/group within last year: Yes    [facility] referral signed within last year: Yes    Fausto meets all criteria for refill (current [facility] referral, office visit with referring provider/group in last year, lab monitoring up to date or not exceeding 2 weeks overdue). Rx instructions and quantity supplied updated to match patient's current dosing plan. Warfarin 90 day supply with 1 refill granted per [facility] protocol     Aure Sampson RN  Anticoagulation Clinic

## 2022-06-23 DIAGNOSIS — K51.20 ULCERATIVE PROCTITIS WITHOUT COMPLICATION (H): ICD-10-CM

## 2022-06-23 RX ORDER — MESALAMINE 800 MG/1
1 TABLET, DELAYED RELEASE ORAL 2 TIMES DAILY
Qty: 180 TABLET | Refills: 0 | Status: SHIPPED | OUTPATIENT
Start: 2022-06-23 | End: 2022-08-01

## 2022-06-23 NOTE — TELEPHONE ENCOUNTER
Reason for Call:  Medication or medication refill:mesalamine (ASACOL HD) 800 MG EC tablet    Do you use a [facility]?  Name of the pharmacy and phone number for the current request:  needs hard copy faxed to:  [facility]   [phone]    Name of the medication requested: mesalamine (ASACOL HD) 800 MG EC tablet    Other request: patient is almost out , please call patient back to confirm    Can we leave a detailed message on this number? YES    Phone number patient can be reached at: Home number on file [phone] (home)    Best Time: anytime    Call taken on 6/23/2022 at 10:37 AM by Gema Grullon

## 2022-06-23 NOTE — TELEPHONE ENCOUNTER
MA/TC: Please let pt know that we faxed the rx today. Thanks.     LOV was on 1/13/22 for DM f/u. Last px was on 7/26/21. Pt has upcoming appt with  on 8/1/22 for routine px.     Refilled 3 months supply with no refills to last till his upcoming visit. Hand faxed rx to [phone] as per pt's request.     mesalamine (ASACOL HD) 800 MG EC tablet 180 tablet 3 7/26/2021  No   Sig - Route: Take 1 tablet (800 mg) by mouth 2 times daily - Oral     Nikolay RN  Patient Advocate [facility]ar[facility] (PAL)  [facility]

## 2022-06-30 ENCOUNTER — LAB (OUTPATIENT)
Dept: LAB | Facility: CLINIC | Age: 82
End: 2022-06-30
Payer: MEDICARE

## 2022-06-30 ENCOUNTER — ANTICOAGULATION THERAPY VISIT (OUTPATIENT)
Dept: ANTICOAGULATION | Facility: CLINIC | Age: 82
End: 2022-06-30

## 2022-06-30 DIAGNOSIS — Z95.2 S/P AORTIC VALVE REPLACEMENT: Primary | ICD-10-CM

## 2022-06-30 DIAGNOSIS — I48.19 PERSISTENT ATRIAL FIBRILLATION (H): ICD-10-CM

## 2022-06-30 DIAGNOSIS — Z79.01 LONG TERM CURRENT USE OF ANTICOAGULANT THERAPY: ICD-10-CM

## 2022-06-30 DIAGNOSIS — Z95.2 S/P MITRAL VALVE REPLACEMENT: ICD-10-CM

## 2022-06-30 DIAGNOSIS — Z95.2 S/P AORTIC VALVE REPLACEMENT: ICD-10-CM

## 2022-06-30 DIAGNOSIS — C61 PROSTATE CANCER (H): ICD-10-CM

## 2022-06-30 DIAGNOSIS — I50.22 CHRONIC SYSTOLIC CONGESTIVE HEART FAILURE (H): ICD-10-CM

## 2022-06-30 DIAGNOSIS — Z12.5 SCREENING FOR PROSTATE CANCER: ICD-10-CM

## 2022-06-30 LAB — INR BLD: 3.7 (ref 0.9–1.1)

## 2022-06-30 PROCEDURE — 36416 COLLJ CAPILLARY BLOOD SPEC: CPT

## 2022-06-30 PROCEDURE — 85610 PROTHROMBIN TIME: CPT

## 2022-06-30 NOTE — PROGRESS NOTES
ANTICOAGULATION MANAGEMENT     Fausto Farr 82 year old male is on warfarin with supratherapeutic INR result. (Goal INR 2.5-3.5)    Recent labs: (last 7 days)     06/30/22  0816   INR 3.7*       ASSESSMENT       Source(s): Chart Review    Previous INR was Therapeutic last 2(+) visits    Medication, diet, health changes since last INR chart reviewed; none identified     PLAN     Recommended plan for no diet, medication or health factor changes affecting INR     Dosing Instructions: continue your current warfarin dose with next INR in 2 weeks       Summary  As of 6/30/2022    Full warfarin instructions:  5 mg every Sun, Wed; 7.5 mg all other days   Next INR check:  7/14/2022             Detailed voice message left for Ralph with dosing instructions and follow up date.     Contact [phone]  to schedule and with any changes, questions or concerns.     Education provided: Please call back if any changes to your diet, medications or how you've been taking warfarin and Goal range and significance of current result    Plan made per [facility] anticoagulation protocol    Yesenia Gonzalez RN  Anticoagulation Clinic  6/30/2022    _______________________________________________________________________     Anticoagulation Episode Summary     Current INR goal:  2.5-3.5   TTR:  73.0 % (1 y)   Target end date:  Indefinite   Send INR reminders to:  SHANNA DOWELL    Indications    S/P aortic valve replacement [Z95.2]  S/P mitral valve replacement [Z95.2]  Long term current use of anticoagulant therapy [Z79.01]  Persistent atrial fibrillation (H) [I48.19]           Comments:           Anticoagulation Care Providers     Provider Role Specialty Phone number    Jodi Flower Mai, MD Referring Internal Medicine - Pediatrics [phone]

## 2022-07-25 NOTE — DISCHARGE INSTRUCTIONS
Endocrinology consulted for BG management.   BG goal 140-180    - D/C Novolog (aspart) insulin 4 Units SQ TIDWM  - Continue Novolog LDC SSI (150/50) prn for BG excursions.   - Start Januvia 100 mg QD to prevent prandial excursions. (No history of pancreatitis or medullary thyroid CA).    - BG checks AC/HS  - Hypoglycemia protocol in place    ** Please notify Endocrine for any change and/or advance in diet**  ** Please call Endocrine for any BG related issues **    Discharge Planning:   TBD. Please notify endocrinology prior to discharge.       Your weekend infusions are at [facility]. This is located behind the hospital by the Emergency room. Follow the signs to the ER and you should see the [facility] ([address], [address]). Park in the [facility] (behind the hospital). Their phone number is [phone].     Please contact [facility] ([phone]) to schedule a follow up appointment with Dr. Leija in 4 weeks.

## 2022-07-27 ENCOUNTER — LAB (OUTPATIENT)
Dept: LAB | Facility: CLINIC | Age: 82
End: 2022-07-27
Payer: MEDICARE

## 2022-07-27 ENCOUNTER — ANTICOAGULATION THERAPY VISIT (OUTPATIENT)
Dept: ANTICOAGULATION | Facility: CLINIC | Age: 82
End: 2022-07-27

## 2022-07-27 DIAGNOSIS — Z12.5 SCREENING FOR PROSTATE CANCER: ICD-10-CM

## 2022-07-27 DIAGNOSIS — Z79.01 LONG TERM CURRENT USE OF ANTICOAGULANT THERAPY: ICD-10-CM

## 2022-07-27 DIAGNOSIS — Z95.2 S/P MITRAL VALVE REPLACEMENT: ICD-10-CM

## 2022-07-27 DIAGNOSIS — I48.19 PERSISTENT ATRIAL FIBRILLATION (H): ICD-10-CM

## 2022-07-27 DIAGNOSIS — I73.9 PVD (PERIPHERAL VASCULAR DISEASE) (H): ICD-10-CM

## 2022-07-27 DIAGNOSIS — Z95.2 S/P AORTIC VALVE REPLACEMENT: ICD-10-CM

## 2022-07-27 DIAGNOSIS — I50.22 CHRONIC SYSTOLIC CONGESTIVE HEART FAILURE (H): ICD-10-CM

## 2022-07-27 DIAGNOSIS — Z95.2 S/P AORTIC VALVE REPLACEMENT: Primary | ICD-10-CM

## 2022-07-27 DIAGNOSIS — E11.9 TYPE 2 DIABETES MELLITUS WITHOUT COMPLICATION, WITHOUT LONG-TERM CURRENT USE OF INSULIN (H): ICD-10-CM

## 2022-07-27 LAB
ERYTHROCYTE [DISTWIDTH] IN BLOOD BY AUTOMATED COUNT: 12.5 % (ref 10–15)
HBA1C MFR BLD: 6.5 % (ref 0–5.6)
HCT VFR BLD AUTO: 41.8 % (ref 40–53)
HGB BLD-MCNC: 13.9 G/DL (ref 13.3–17.7)
INR BLD: 2.5 (ref 0.9–1.1)
MCH RBC QN AUTO: 29.9 PG (ref 26.5–33)
MCHC RBC AUTO-ENTMCNC: 33.3 G/DL (ref 31.5–36.5)
MCV RBC AUTO: 90 FL (ref 78–100)
PLATELET # BLD AUTO: 290 10E3/UL (ref 150–450)
RBC # BLD AUTO: 4.65 10E6/UL (ref 4.4–5.9)
WBC # BLD AUTO: 8.4 10E3/UL (ref 4–11)

## 2022-07-27 PROCEDURE — 85027 COMPLETE CBC AUTOMATED: CPT

## 2022-07-27 PROCEDURE — 85610 PROTHROMBIN TIME: CPT

## 2022-07-27 PROCEDURE — 83036 HEMOGLOBIN GLYCOSYLATED A1C: CPT

## 2022-07-27 PROCEDURE — 36416 COLLJ CAPILLARY BLOOD SPEC: CPT

## 2022-07-27 PROCEDURE — G0103 PSA SCREENING: HCPCS

## 2022-07-27 PROCEDURE — 80061 LIPID PANEL: CPT

## 2022-07-27 PROCEDURE — 36415 COLL VENOUS BLD VENIPUNCTURE: CPT

## 2022-07-27 PROCEDURE — 80053 COMPREHEN METABOLIC PANEL: CPT

## 2022-07-27 NOTE — PROGRESS NOTES
ANTICOAGULATION MANAGEMENT     Fausto Farr 82 year old male is on warfarin with therapeutic INR result. (Goal INR 2.5-3.5)    Recent labs: (last 7 days)     07/27/22  0822   INR 2.5*       ASSESSMENT       Source(s): Chart Review    Previous INR was Supratherapeutic    Medication, diet, health changes since last INR chart reviewed; none identified           PLAN     Unable to reach Ralph today.    Left message to call [phone].     Follow up required to confirm warfarin dose taken and assess for changes    Cece Rios RN  Anticoagulation Clinic  7/27/2022

## 2022-07-28 LAB
ALBUMIN SERPL-MCNC: 4 G/DL (ref 3.4–5)
ALP SERPL-CCNC: 56 U/L (ref 40–150)
ALT SERPL W P-5'-P-CCNC: 28 U/L (ref 0–70)
ANION GAP SERPL CALCULATED.3IONS-SCNC: 7 MMOL/L (ref 3–14)
AST SERPL W P-5'-P-CCNC: 27 U/L (ref 0–45)
BILIRUB SERPL-MCNC: 0.4 MG/DL (ref 0.2–1.3)
BUN SERPL-MCNC: 21 MG/DL (ref 7–30)
CALCIUM SERPL-MCNC: 9.6 MG/DL (ref 8.5–10.1)
CHLORIDE BLD-SCNC: 106 MMOL/L (ref 94–109)
CHOLEST SERPL-MCNC: 123 MG/DL
CO2 SERPL-SCNC: 24 MMOL/L (ref 20–32)
CREAT SERPL-MCNC: 1.02 MG/DL (ref 0.66–1.25)
FASTING STATUS PATIENT QL REPORTED: ABNORMAL
GFR SERPL CREATININE-BSD FRML MDRD: 74 ML/MIN/1.73M2
GLUCOSE BLD-MCNC: 122 MG/DL (ref 70–99)
HDLC SERPL-MCNC: 34 MG/DL
LDLC SERPL CALC-MCNC: 64 MG/DL
NONHDLC SERPL-MCNC: 89 MG/DL
POTASSIUM BLD-SCNC: 4.6 MMOL/L (ref 3.4–5.3)
PROT SERPL-MCNC: 7.5 G/DL (ref 6.8–8.8)
PSA SERPL-MCNC: <0.01 UG/L (ref 0–4)
SODIUM SERPL-SCNC: 137 MMOL/L (ref 133–144)
TRIGL SERPL-MCNC: 124 MG/DL

## 2022-07-28 NOTE — RESULT ENCOUNTER NOTE
Dear Ralph,    Your lab results are stable.  At this time, I do not recommend any changes to your current plan of care.    Please feel free to call with any questions.  Otherwise, we can discuss further at your next appointment.    Sincerely,    Chris Flower MD

## 2022-08-01 ENCOUNTER — OFFICE VISIT (OUTPATIENT)
Dept: PEDIATRICS | Facility: CLINIC | Age: 82
End: 2022-08-01
Payer: MEDICARE

## 2022-08-01 ENCOUNTER — APPOINTMENT (OUTPATIENT)
Dept: CT IMAGING | Facility: CLINIC | Age: 82
DRG: 242 | End: 2022-08-01
Attending: EMERGENCY MEDICINE
Payer: MEDICARE

## 2022-08-01 ENCOUNTER — HOSPITAL ENCOUNTER (INPATIENT)
Facility: CLINIC | Age: 82
LOS: 2 days | Discharge: HOME | DRG: 242 | End: 2022-08-03
Attending: EMERGENCY MEDICINE | Admitting: STUDENT IN AN ORGANIZED HEALTH CARE EDUCATION/TRAINING PROGRAM
Payer: MEDICARE

## 2022-08-01 VITALS
HEART RATE: 46 BPM | SYSTOLIC BLOOD PRESSURE: 160 MMHG | DIASTOLIC BLOOD PRESSURE: 60 MMHG | TEMPERATURE: 97.4 F | WEIGHT: 217 LBS | OXYGEN SATURATION: 96 % | BODY MASS INDEX: 35.56 KG/M2

## 2022-08-01 DIAGNOSIS — R00.1 BRADYCARDIA: ICD-10-CM

## 2022-08-01 DIAGNOSIS — H53.9 VISION CHANGES: ICD-10-CM

## 2022-08-01 DIAGNOSIS — R33.9 URINARY RETENTION: ICD-10-CM

## 2022-08-01 DIAGNOSIS — G45.9 TIA (TRANSIENT ISCHEMIC ATTACK): ICD-10-CM

## 2022-08-01 DIAGNOSIS — Z95.2 HISTORY OF HEART VALVE REPLACEMENT WITH MECHANICAL VALVE: ICD-10-CM

## 2022-08-01 DIAGNOSIS — K51.20 ULCERATIVE PROCTITIS WITHOUT COMPLICATION (H): ICD-10-CM

## 2022-08-01 DIAGNOSIS — Z23 HIGH PRIORITY FOR 2019-NCOV VACCINE: ICD-10-CM

## 2022-08-01 DIAGNOSIS — E78.2 MIXED HYPERLIPIDEMIA: ICD-10-CM

## 2022-08-01 DIAGNOSIS — I48.19 PERSISTENT ATRIAL FIBRILLATION (H): ICD-10-CM

## 2022-08-01 DIAGNOSIS — I73.9 PVD (PERIPHERAL VASCULAR DISEASE) (H): ICD-10-CM

## 2022-08-01 DIAGNOSIS — Z95.2 S/P AORTIC VALVE REPLACEMENT: ICD-10-CM

## 2022-08-01 DIAGNOSIS — I63.9 CEREBROVASCULAR ACCIDENT (CVA), UNSPECIFIED MECHANISM (H): ICD-10-CM

## 2022-08-01 DIAGNOSIS — I44.2 COMPLETE ATRIOVENTRICULAR BLOCK (H): Primary | ICD-10-CM

## 2022-08-01 DIAGNOSIS — I50.32 CHRONIC DIASTOLIC CONGESTIVE HEART FAILURE (H): ICD-10-CM

## 2022-08-01 DIAGNOSIS — E11.9 TYPE 2 DIABETES MELLITUS WITHOUT COMPLICATION, WITHOUT LONG-TERM CURRENT USE OF INSULIN (H): ICD-10-CM

## 2022-08-01 DIAGNOSIS — I44.2 THIRD DEGREE HEART BLOCK BY ELECTROCARDIOGRAM (H): Primary | ICD-10-CM

## 2022-08-01 DIAGNOSIS — Z95.2 S/P MITRAL VALVE REPLACEMENT: ICD-10-CM

## 2022-08-01 DIAGNOSIS — I48.91 ATRIAL FIBRILLATION, UNSPECIFIED TYPE (H): ICD-10-CM

## 2022-08-01 DIAGNOSIS — Z79.01 LONG TERM CURRENT USE OF ANTICOAGULANTS WITH INR GOAL OF 2.5-3.5: ICD-10-CM

## 2022-08-01 LAB
ANION GAP SERPL CALCULATED.3IONS-SCNC: 2 MMOL/L (ref 3–14)
ATRIAL RATE - MUSE: 39 BPM
ATRIAL RATE - MUSE: 62 BPM
BASOPHILS # BLD AUTO: 0 10E3/UL (ref 0–0.2)
BASOPHILS NFR BLD AUTO: 0 %
BUN SERPL-MCNC: 17 MG/DL (ref 7–30)
CALCIUM SERPL-MCNC: 9 MG/DL (ref 8.5–10.1)
CHLORIDE BLD-SCNC: 107 MMOL/L (ref 94–109)
CO2 SERPL-SCNC: 28 MMOL/L (ref 20–32)
CREAT SERPL-MCNC: 0.93 MG/DL (ref 0.66–1.25)
DIASTOLIC BLOOD PRESSURE - MUSE: NORMAL MMHG
DIASTOLIC BLOOD PRESSURE - MUSE: NORMAL MMHG
EOSINOPHIL # BLD AUTO: 0.2 10E3/UL (ref 0–0.7)
EOSINOPHIL NFR BLD AUTO: 3 %
ERYTHROCYTE [DISTWIDTH] IN BLOOD BY AUTOMATED COUNT: 12.3 % (ref 10–15)
GFR SERPL CREATININE-BSD FRML MDRD: 82 ML/MIN/1.73M2
GLUCOSE BLD-MCNC: 101 MG/DL (ref 70–99)
HCT VFR BLD AUTO: 41 % (ref 40–53)
HGB BLD-MCNC: 13.4 G/DL (ref 13.3–17.7)
HOLD SPECIMEN: NORMAL
IMM GRANULOCYTES # BLD: 0 10E3/UL
IMM GRANULOCYTES NFR BLD: 0 %
INR PPP: 2.34 (ref 0.85–1.15)
INTERPRETATION ECG - MUSE: NORMAL
INTERPRETATION ECG - MUSE: NORMAL
LYMPHOCYTES # BLD AUTO: 1.3 10E3/UL (ref 0.8–5.3)
LYMPHOCYTES NFR BLD AUTO: 17 %
MCH RBC QN AUTO: 29.2 PG (ref 26.5–33)
MCHC RBC AUTO-ENTMCNC: 32.7 G/DL (ref 31.5–36.5)
MCV RBC AUTO: 89 FL (ref 78–100)
MONOCYTES # BLD AUTO: 0.7 10E3/UL (ref 0–1.3)
MONOCYTES NFR BLD AUTO: 10 %
NEUTROPHILS # BLD AUTO: 5 10E3/UL (ref 1.6–8.3)
NEUTROPHILS NFR BLD AUTO: 70 %
NRBC # BLD AUTO: 0 10E3/UL
NRBC BLD AUTO-RTO: 0 /100
P AXIS - MUSE: 59 DEGREES
P AXIS - MUSE: NORMAL DEGREES
PLATELET # BLD AUTO: 255 10E3/UL (ref 150–450)
POTASSIUM BLD-SCNC: 4.3 MMOL/L (ref 3.4–5.3)
PR INTERVAL - MUSE: NORMAL MS
PR INTERVAL - MUSE: NORMAL MS
QRS DURATION - MUSE: 134 MS
QRS DURATION - MUSE: 142 MS
QT - MUSE: 512 MS
QT - MUSE: 538 MS
QTC - MUSE: 433 MS
QTC - MUSE: 448 MS
R AXIS - MUSE: -64 DEGREES
R AXIS - MUSE: -74 DEGREES
RBC # BLD AUTO: 4.59 10E6/UL (ref 4.4–5.9)
SARS-COV-2 RNA RESP QL NAA+PROBE: NEGATIVE
SODIUM SERPL-SCNC: 137 MMOL/L (ref 133–144)
SYSTOLIC BLOOD PRESSURE - MUSE: NORMAL MMHG
SYSTOLIC BLOOD PRESSURE - MUSE: NORMAL MMHG
T AXIS - MUSE: 71 DEGREES
T AXIS - MUSE: 80 DEGREES
VENTRICULAR RATE- MUSE: 39 BPM
VENTRICULAR RATE- MUSE: 46 BPM
WBC # BLD AUTO: 7.2 10E3/UL (ref 4–11)

## 2022-08-01 PROCEDURE — 36415 COLL VENOUS BLD VENIPUNCTURE: CPT | Performed by: STUDENT IN AN ORGANIZED HEALTH CARE EDUCATION/TRAINING PROGRAM

## 2022-08-01 PROCEDURE — 85610 PROTHROMBIN TIME: CPT | Performed by: EMERGENCY MEDICINE

## 2022-08-01 PROCEDURE — 96365 THER/PROPH/DIAG IV INF INIT: CPT | Mod: 59

## 2022-08-01 PROCEDURE — 93005 ELECTROCARDIOGRAM TRACING: CPT

## 2022-08-01 PROCEDURE — 85025 COMPLETE CBC W/AUTO DIFF WBC: CPT | Performed by: EMERGENCY MEDICINE

## 2022-08-01 PROCEDURE — 93000 ELECTROCARDIOGRAM COMPLETE: CPT | Performed by: INTERNAL MEDICINE

## 2022-08-01 PROCEDURE — 70496 CT ANGIOGRAPHY HEAD: CPT | Mod: ME

## 2022-08-01 PROCEDURE — 96366 THER/PROPH/DIAG IV INF ADDON: CPT

## 2022-08-01 PROCEDURE — 70498 CT ANGIOGRAPHY NECK: CPT | Mod: ME

## 2022-08-01 PROCEDURE — 250N000009 HC RX 250: Performed by: EMERGENCY MEDICINE

## 2022-08-01 PROCEDURE — 99223 1ST HOSP IP/OBS HIGH 75: CPT | Mod: AI | Performed by: STUDENT IN AN ORGANIZED HEALTH CARE EDUCATION/TRAINING PROGRAM

## 2022-08-01 PROCEDURE — 36415 COLL VENOUS BLD VENIPUNCTURE: CPT | Performed by: EMERGENCY MEDICINE

## 2022-08-01 PROCEDURE — 80048 BASIC METABOLIC PNL TOTAL CA: CPT | Performed by: EMERGENCY MEDICINE

## 2022-08-01 PROCEDURE — 120N000001 HC R&B MED SURG/OB

## 2022-08-01 PROCEDURE — C9803 HOPD COVID-19 SPEC COLLECT: HCPCS

## 2022-08-01 PROCEDURE — 96376 TX/PRO/DX INJ SAME DRUG ADON: CPT

## 2022-08-01 PROCEDURE — 99207 PR INPT ADMISSION FROM CLINIC: CPT | Performed by: INTERNAL MEDICINE

## 2022-08-01 PROCEDURE — 250N000011 HC RX IP 250 OP 636: Performed by: STUDENT IN AN ORGANIZED HEALTH CARE EDUCATION/TRAINING PROGRAM

## 2022-08-01 PROCEDURE — 99285 EMERGENCY DEPT VISIT HI MDM: CPT | Mod: 25

## 2022-08-01 PROCEDURE — U0003 INFECTIOUS AGENT DETECTION BY NUCLEIC ACID (DNA OR RNA); SEVERE ACUTE RESPIRATORY SYNDROME CORONAVIRUS 2 (SARS-COV-2) (CORONAVIRUS DISEASE [COVID-19]), AMPLIFIED PROBE TECHNIQUE, MAKING USE OF HIGH THROUGHPUT TECHNOLOGIES AS DESCRIBED BY CMS-2020-01-R: HCPCS | Performed by: EMERGENCY MEDICINE

## 2022-08-01 PROCEDURE — G1010 CDSM STANSON: HCPCS

## 2022-08-01 PROCEDURE — 250N000011 HC RX IP 250 OP 636: Performed by: EMERGENCY MEDICINE

## 2022-08-01 RX ORDER — IOPAMIDOL 755 MG/ML
75 INJECTION, SOLUTION INTRAVASCULAR ONCE
Status: COMPLETED | OUTPATIENT
Start: 2022-08-01 | End: 2022-08-01

## 2022-08-01 RX ORDER — HEPARIN SODIUM 10000 [USP'U]/100ML
0-5000 INJECTION, SOLUTION INTRAVENOUS CONTINUOUS
Status: DISCONTINUED | OUTPATIENT
Start: 2022-08-01 | End: 2022-08-02

## 2022-08-01 RX ORDER — TAMSULOSIN HYDROCHLORIDE 0.4 MG/1
0.4 CAPSULE ORAL DAILY
Qty: 90 CAPSULE | Refills: 3 | Status: SHIPPED | OUTPATIENT
Start: 2022-08-01 | End: 2023-10-23

## 2022-08-01 RX ORDER — ROSUVASTATIN CALCIUM 40 MG/1
40 TABLET, COATED ORAL DAILY
Qty: 90 TABLET | Refills: 3 | Status: SHIPPED | OUTPATIENT
Start: 2022-08-01 | End: 2022-08-08

## 2022-08-01 RX ORDER — MESALAMINE 800 MG/1
1 TABLET, DELAYED RELEASE ORAL 2 TIMES DAILY
Qty: 180 TABLET | Refills: 3 | Status: SHIPPED | OUTPATIENT
Start: 2022-08-01 | End: 2022-08-08

## 2022-08-01 RX ORDER — FUROSEMIDE 40 MG
20 TABLET ORAL DAILY
Qty: 45 TABLET | Refills: 3 | Status: SHIPPED | OUTPATIENT
Start: 2022-08-01 | End: 2022-10-18

## 2022-08-01 RX ORDER — EZETIMIBE 10 MG/1
10 TABLET ORAL DAILY
Qty: 90 TABLET | Refills: 3 | Status: SHIPPED | OUTPATIENT
Start: 2022-08-01 | End: 2022-08-08

## 2022-08-01 RX ADMIN — IOPAMIDOL 75 ML: 755 INJECTION, SOLUTION INTRAVENOUS at 15:03

## 2022-08-01 RX ADMIN — SODIUM CHLORIDE 90 ML: 900 INJECTION INTRAVENOUS at 15:03

## 2022-08-01 RX ADMIN — HEPARIN SODIUM 1200 UNITS/HR: 10000 INJECTION, SOLUTION INTRAVENOUS at 19:45

## 2022-08-01 ASSESSMENT — ENCOUNTER SYMPTOMS
JOINT SWELLING: 0
HEARTBURN: 0
DIARRHEA: 0
WEAKNESS: 0
NUMBNESS: 0
SPEECH DIFFICULTY: 0
DIZZINESS: 1
PARESTHESIAS: 0
HEADACHES: 1
HEADACHES: 1
LIGHT-HEADEDNESS: 1
PALPITATIONS: 0
HEMATURIA: 0
CONSTIPATION: 0
HEMATOCHEZIA: 0
DYSURIA: 0
ARTHRALGIAS: 1
SHORTNESS OF BREATH: 1
NAUSEA: 1
COUGH: 0
FREQUENCY: 0
SORE THROAT: 0
WEAKNESS: 0
CHILLS: 0
ABDOMINAL PAIN: 0
NERVOUS/ANXIOUS: 0
MYALGIAS: 0
EYE PAIN: 0
FEVER: 0

## 2022-08-01 ASSESSMENT — ACTIVITIES OF DAILY LIVING (ADL)
ADLS_ACUITY_SCORE: 35
CURRENT_FUNCTION: NO ASSISTANCE NEEDED
ADLS_ACUITY_SCORE: 35

## 2022-08-01 NOTE — PATIENT INSTRUCTIONS
Patient Education   Preventive Health Recommendations  See your health care provider every year to    Review health changes.     Discuss preventive care.      Review your medicines if your doctor has prescribed any.    Talk with your health care provider about whether you should have a test to screen for prostate cancer (PSA).    Every 3 years, have a diabetes test (fasting glucose). If you are at risk for diabetes, you should have this test more often.    Every 5 years, have a cholesterol test. Have this test more often if you are at risk for high cholesterol or heart disease.     Every 10 years, have a colonoscopy. Or, have a yearly FIT test (stool test). These exams will check for colon cancer.    Talk to with your health care provider about screening for Abdominal Aortic Aneurysm if you have a family history of AAA or have a history of smoking.    Shots:     Get a flu shot each year.     Get a tetanus shot every 10 years.     Talk to your doctor about your pneumonia vaccines. There are now two you should receive - Pneumovax (PPSV 23) and Prevnar (PCV 13).    Talk to your pharmacist about a shingles vaccine.     Talk to your doctor about the hepatitis B vaccine.    Nutrition:     Eat at least 5 servings of fruits and vegetables each day.     Eat whole-grain bread, whole-wheat pasta and brown rice instead of white grains and rice.     Get adequate Calcium and Vitamin D.     Lifestyle    Exercise for at least 150 minutes a week (30 minutes a day, 5 days a week). This will help you control your weight and prevent disease.     Limit alcohol to one drink per day.     No smoking.     Wear sunscreen to prevent skin cancer.     See your dentist every six months for an exam and cleaning.     See your eye doctor every 1 to 2 years to screen for conditions such as glaucoma, macular degeneration and cataracts.    Personalized Prevention Plan  You are due for the preventive services outlined below.  Your care team is  available to assist you in scheduling these services.  If you have already completed any of these items, please share that information with your care team to update in your medical record.  Health Maintenance   Topic Date Due     MICROALBUMIN  Never done     DIABETIC FOOT EXAM  Never done     URINE DRUG SCREEN  Never done     EYE EXAM  Never done     HF ACTION PLAN  07/18/2019     COVID-19 Vaccine (4 - Booster for Moderna series) 02/05/2022     ANNUAL REVIEW OF HM ORDERS  07/26/2022     INFLUENZA VACCINE (1) 09/01/2022     A1C  10/27/2022     BMP  01/27/2023     ALT  07/27/2023     LIPID  07/27/2023     PSA  07/27/2023     CBC  07/27/2023     MEDICARE ANNUAL WELLNESS VISIT  08/01/2023     FALL RISK ASSESSMENT  08/01/2023     ADVANCE CARE PLANNING  08/01/2027     DTAP/TDAP/TD IMMUNIZATION (4 - Td or Tdap) 09/07/2027     TSH W/FREE T4 REFLEX  Completed     PHQ-2 (once per calendar year)  Completed     Pneumococcal Vaccine: 65+ Years  Completed     ZOSTER IMMUNIZATION  Completed     IPV IMMUNIZATION  Aged Out     MENINGITIS IMMUNIZATION  Aged Out

## 2022-08-01 NOTE — H&P
[facility]    History and Physical - Hospitalist Service       Date of Admission:  8/1/2022    Assessment & Plan      Fausto Farr is a 82 year old male with past medical history significant for CAD s/p CABG, AAA s/p repair, mechanical AVR and MVR, paroxysmal atrial fibrillation/flutter, GAGE, and many other issues admitted on 8/1/2022 with intermittent light-headedness and visual changes.     Complete heart block  Underlying AV conduction disease  Hx of paroxysmal atrial fibrillation  History of typical atrial flutter, successful catheter ablation in 04/2019  The patient presented initially to clinic with complaints of intermittent light-headedness and blurry vision. In Clinic he was found to be bradycardic with 3rd degree heart block on EKG.   He did end up having a stroke work-up in the ED due to complaints of visual changes. He does have extensive atherosclerotic plaquing but no acute stroke was identified. MRI is still pending. EKG in the ED showed sinus rhythm with AV dissociation and idioventricular rhythm. Suspect his symptoms are primarily related to his cardiac dysrhythmia.   - Cardiac monitoring   - Follow-up MRI   - Cardiology consult   - NPO at midnight      Valvular heart disease - AVR in 2006 with subsequent perivalvular leak and redo mechanical AVR and concomitant mechanical MVR in 2013  Coronary artery disease s/p CABG (LIMA to LAD and radial graft to RCA) in 2006.  Chronic diastolic heart failure.  LVEF is 55%-60%.   Previous endovascular AAA repair  Peripheral vascular disease, carotid artery atherosclerosis   Hypertension  - Hold PTA Warfarin - INR on admission is 2.34  - No need for INR reversal per cardiology   - Start heparin bridge in anticipation of pacemaker placement   - Continue PTA Lasix 20mg daily    - Continue PTA Ezetimibe, rosuvastatin    - Not prescribed BB or ACEi PTA     Obstructive sleep apnea  - CPAP at night     Ulcerative Colitis   - Continue PTA  mesalamine     Type II DM   Hemoglobin A1C was 6.5% recently. Not on any medications currently.   - Monitor blood sugars    Status post total right knee replacement  With multiple complications and non-healing wounds.    - Now on oral augmentin for prevention indefinitely - continue     Diet: Regular diet   DVT Prophylaxis: Heparin  Lord Catheter: Not present  Central Lines: None  Cardiac Monitoring: None  Code Status: Full Code       Disposition Plan      The patient's care was discussed with the Patient.    Caroline Roberts  [place] Hospitalist Service  [facility]  Securely message with the Vocera Web Console (learn more here)  Text page via [facility] Paging/Directory         ______________________________________________________________________    Chief Complaint   Light-headedness   History is obtained from the patient    History of Present Illness   Fausto Farr is a 82 year old male who presents to the ED from clinic for evaluation of symptomatic bradycardia.     He reports that on Saturday he was working out in his garden and started feeling quite light-headed. He also noted some visual changes. He initially described some blurry vision, but then went on to say that he couldn't see anything to the right side of his visual field. These symptoms eventually resolved when after he went in a rested. He notes intermittent episodes of light-headedness previously, but nothing as severe. He was seen in clinic today and his HRs were found to be in the 30s with EKG showing complete heart block. He was sent to the ED for further evaluation.      The patient while lying in bed is asymptomatic aside from a mild frontal headache. He denies any visual changes currently, but while getting up to go to the bathroom earlier he did have recurrence of light-headedness and visual deficits.     He denies chest pain, palpitations, or shortness of breath.     Review of Systems    The 10 point Review of Systems is  negative other than noted in the HPI or here.     Past Medical History    I have reviewed this patient's medical history and updated it with pertinent information if needed.   Past Medical History:   Diagnosis Date     Abdominal pain      Abnormal ECG     RBBB, 1st degree AVB, Left axis deviation     Anemia     currently taking iron     Arrhythmia     pac, pvc, NSVT, Afib off BB, prior AFlablation,  and RBBB and LAHB and 1st degree AVB and mobitz 2 on BB     Back pain     since 1980     BPH (benign prostatic hyperplasia)      Bruit      CAD (coronary artery disease)      Cellulitis 10/18/2012     Cellulitis 05/2018    GrpB strep LLE cellulitis  negative RACHAEL for veg     Chronic venous insufficiency     bilat lower extremities     Contact dermatitis and other eczema, due to unspecified cause      Diaphragmatic hernia without mention of obstruction or gangrene      Diastolic HF (heart failure) (H)      Gastric ulcer      Glucose intolerance (impaired glucose tolerance)      Heart murmur 09/16/2013    valvular heart disease     Hyperlipidemia LDL goal <100 08/06/2013     Hypertension 08/06/2013     Lumbago      Malaise and fatigue      Mesenteric artery insufficiency (H)     known AAA and narrowing of intestinal artery's  followed by dr raymond     Metabolic syndrome      Mitral valve disease     s/p mechanical MVR, prior mech AVR     Nocturia 10/18/2012     Nonallopathic lesion of cervical region      Nonallopathic lesion of lumbar region      Nonallopathic lesion of pelvic region, not elsewhere classified      Nonallopathic lesion of rib cage      Nonallopathic lesion of sacral region      GAGE (obstructive sleep apnea)     CPAP     GAGE (obstructive sleep apnea)      Paroxysmal atrial fibrillation (H) 10/18/2012    occured after stopping BB  (prior  aflutter ablation)     Prostate cancer (H) 2008    radiation seed, XRT      PVD (peripheral vascular disease) (H)      Rotator cuff strain     and sprain     S/P AAA  repair      S/P aortic valve replacement 2006    developed perivalve leak and MS, therefore redo surg 2013     S/P CABG (coronary artery bypass graft) 2006    Lima-Lad, RA-Rca     Sciatica of left side     since 2000     Sepsis due to group B Streptococcus (H) 05/19/2018     Ulcerative colitis (H)      Varicose veins of bilateral lower extremities with other complications     s/p RLE vein stripping     Vitamin D deficiency        Past Surgical History   I have reviewed this patient's surgical history and updated it with pertinent information if needed.  Past Surgical History:   Procedure Laterality Date     AORTIC VALVE REPLACEMENT  1/3/06    redo AVR SJM 21mm and SJM MVR 27mm in 2013SJM 21([id number]):AVR, SJM 27 :MVR-     APPLY WOUND VAC N/A 11/12/2019    Procedure: APPLICATION, WOUND VAC;  Surgeon: Sara Martinez MD;  Location:  OR     ARTHROPLASTY KNEE      right knee     ARTHROPLASTY KNEE Right 7/22/2019    Procedure: Right total knee arthroplasty;  Surgeon: Prasanth Jensen MD;  Location:  OR     BACK SURGERY  Oct 2015    Fusion L4-5, laminectomy L2, L3     BYPASS GRAFT ARTERY CORONARY  10/2013    reimplantation of radial artery graft to RCA     CARDIAC CATHERIZATION  11/2005    Stent placed to RCA     CARDIAC CATHERIZATION  04/2013    Occluded RCA, patent LIMA to LAD and radial graft to PDA     CARPAL TUNNEL RELEASE RT/LT  1994     COLONOSCOPY  8-22-11     CYSTOSCOPY FLEXIBLE  10/16/2013    Procedure: CYSTOSCOPY FLEXIBLE;  FLEXIBLE CYSTOSCOPY / DILATION OF URETHRA / INSERTION OF LESLIE;  Surgeon: Cooper Wallace MD;  Location:  OR     ENDOVASCULAR REPAIR, INFRARENAL ABDOMINAL AORTIC ANEURYSM/DISSECTION; MODULAR BIFURCATED PROSTHESIS  2006    AAA repair endovascular     ENT SURGERY       EP ABLATION ATRIAL FLUTTER N/A 4/22/2019    Procedure: EP Ablation Atrial Flutter;  Surgeon: Jessy Vasquez MD;  Location:  HEART CARDIAC CATH LAB     GENITOURINARY SURGERY  6/16/08     radioactive seeding     GRAFT FLAP PEDICLE EXTREMITY (LOCATION) Right 11/12/2019    Procedure: SURGICAL PROCUREMENT, FLAP, PEDICLE, EXTREMITY;  Surgeon: Sara Martinez MD;  Location: [facility] OR     GRAFT SKIN SPLIT THICKNESS FROM EXTREMITY Right 11/12/2019    Procedure: RIGHT GASTRONEMIUS FLAP TO RIGHT KNEE, SPLIT THICKNESS SKIN GRAFT FROM RIGHT THIGH TO RIGHT KNEE, SURGICAL PROCUREMENT, FLAP, PEDICLE, EXTREMITY, WOUND VAC PLACEMENT;  Surgeon: Sara Martinez MD;  Location:  OR     HEAD & NECK SURGERY  1997    vocal cord polypectomy     INCISION AND DRAINAGE KNEE, COMBINED Right 8/29/2019    Procedure: INCISION AND DRAINAGE, KNEE W/ Secondary Wound Closure;  Surgeon: Prasanth Jensen MD;  Location:  OR     IRRIGATION AND DEBRIDEMENT KNEE, PLACE ANTIBIOTIC CEMENT BEADS / SPACE Right 2/12/2020    Procedure: 1. Irrigation and debridement of wound breakdown, right total knee arthroplasty.  2. Irrigation and debridement of right total knee with placement of antibiotic-impregnated (vancomycin) absorbable antibiotic beads.;  Surgeon: Prasanth Jensen MD;  Location: [facility] OR     IRRIGATION AND DEBRIDEMENT LOWER EXTREMITY, COMBINED Right 12/8/2019    Procedure: DEBRIDEMENT OF RIGHT CALF AND WOUND CLOSURE.;  Surgeon: Sara Martinez MD;  Location:  OR     KNEE SURGERY  2001 Right knee arthroscopy     OPTICAL TRACKING SYSTEM FUSION SPINE POSTERIOR LUMBAR THREE+ LEVELS N/A 10/29/2015    Procedure: OPTICAL TRACKING SYSTEM FUSION SPINE POSTERIOR LUMBAR THREE+ LEVELS;  Surgeon: Walt Garcia MD;  Location:  OR     PROSTATE SURGERY      radioactive seeding 6/16/08     REPAIR ANEURYSM ABDOMINAL AORTA  06/08     REPAIR VALVE MITRAL  10/16/2013    SJM 21(AGFN 756):AVR, SJM 27  501:MVRProcedure: REPAIR VALVE MITRAL;  REDO STERNOTOMY/REDO AORTIC VALVE REPLACEMENT/ MITRAL VALVE REPLACEMENT/REIMPLANTATION OF RIGHT CORONARY ARTERY BYPASS WITH RACHAEL ( ON PUMP);  Surgeon: Viet Singh MD;  Location:  [facility] OR     REPLACE VALVE AORTIC  10/16/2013    Procedure: REPLACE VALVE AORTIC;;  Surgeon: Viet Singh MD;  Location: [facility] OR     SURGERY GENERAL IP CONSULT  2008 Excision aneurysm abdominal aorta     SURGERY GENERAL IP CONSULT   Vocal cord polypectomy     VASCULAR SURGERY  1993     varicose vein stripping     [facility] CABG, VEIN, TWO  1/3/06    Left radial to RCA, LIMA to LAD (RA to RCA reimplanted at time of 2013 surg)       Social History   I have reviewed this patient's social history and updated it with pertinent information if needed.  Social History     Tobacco Use     Smoking status: Former Smoker     Packs/day: 1.00     Years: 40.00     Pack years: 40.00     Start date: 4/15/1962     Quit date: 10/23/2002     Years since quittin.7     Smokeless tobacco: Never Used   Substance Use Topics     Alcohol use: Yes     Comment: a couple beers per week (socially)     Drug use: No       Family History   I have reviewed this patient's family history and updated it with pertinent information if needed.  Family History   Problem Relation Age of Onset     No Known Problems Mother      Coronary Artery Disease Father         CABG     Heart Disease Father         Pacemaker     No Known Problems Brother      No Known Problems Sister      No Known Problems Son      Other Cancer Daughter      No Known Problems Daughter      Heart Disease Brother      Other - See Comments Grandchild        Prior to Admission Medications   Prior to Admission Medications   Prescriptions Last Dose Informant Patient Reported? Taking?   ACE/ARB/ARNI NOT PRESCRIBED (INTENTIONAL)   No No   Sig: Please choose reason not prescribed from choices below.   BETA BLOCKER NOT PRESCRIBED (INTENTIONAL)   No No   Sig: Please choose reason not prescribed from choices below.   acetaminophen (TYLENOL) 500 MG tablet prn  Yes Yes   Sig: Take 500-1,000 mg by mouth every 6 hours as needed for pain   amoxicillin-clavulanate (AUGMENTIN) 875-125 MG  tablet 8/1/2022 at am  Yes Yes   Sig: Take 1 tablet by mouth every morning   betamethasone valerate (VALISONE) 0.1 % external lotion prn  No Yes   Sig: Apply topically 2 times daily Apply topically to scalp twice daily PRN   cholecalciferol 25 MCG (1000 UT) TABS 8/1/2022 at am Self Yes Yes   Sig: Take 1,000 Units by mouth daily   ezetimibe (ZETIA) 10 MG tablet 7/31/2022 at pm  No Yes   Sig: Take 1 tablet (10 mg) by mouth daily   ferrous sulfate (FEROSUL) 325 (65 Fe) MG tablet 8/1/2022 at am Self Yes Yes   Sig: Take 325 mg by mouth daily (with breakfast)   fluocinonide (LIDEX) 0.05 % external ointment prn  No Yes   Sig: Apply topically 2 times daily   Patient taking differently: Apply topically 2 times daily as needed   furosemide (LASIX) 40 MG tablet 7/31/2022 at pm  No Yes   Sig: Take 0.5 tablets (20 mg) by mouth daily   glucosamine-chondroitin 500-400 MG CAPS per capsule 7/31/2022 at pm Self Yes Yes   Sig: Take 1 capsule by mouth every evening   mesalamine (ASACOL HD) 800 MG EC tablet 8/1/2022 at am  No Yes   Sig: Take 1 tablet (800 mg) by mouth 2 times daily   rosuvastatin (CRESTOR) 40 MG tablet 8/1/2022 at am  No Yes   Sig: Take 1 tablet (40 mg) by mouth daily   tamsulosin (FLOMAX) 0.4 MG capsule 7/31/2022 at pm  No Yes   Sig: Take 1 capsule (0.4 mg) by mouth daily   triamcinolone (KENALOG) 0.1 % external cream prn  No Yes   Sig: Apply topically 2 times daily   Patient taking differently: Apply topically 2 times daily as needed   triamcinolone (KENALOG) 0.1 % external lotion prn  No Yes   Sig: Apply topically 2 times daily   Patient taking differently: Apply topically 2 times daily as needed   warfarin ANTICOAGULANT (COUMADIN) 5 MG tablet 7/31/2022 at pm  No Yes   Sig: Take 5mg every Sun & Wed / Take 7.5mg all other days OR per INR clinic      Facility-Administered Medications: None     Allergies   Allergies   Allergen Reactions     Bees Anaphylaxis       Physical Exam   Vital Signs: Temp: 98  F (36.7  C) Temp  src: Temporal BP: (!) 158/70 Pulse: (!) 37   Resp: 11 SpO2: 98 % O2 Device: None (Room air)    Weight: 0 lbs 0 oz    Constitutional: Awake, alert, cooperative, no apparent distress.  Eyes: Conjunctiva and pupils examined and normal.  HEENT: Moist mucous membranes, normal dentition.  Respiratory: Clear to auscultation bilaterally, no crackles or wheezing.  Cardiovascular:  Bradycardic, regular, mechanical S1 and S2, and no murmur noted.  GI: Soft, non-distended, non-tender, normal bowel sounds.  Skin: No rashes, no cyanosis, no edema.  Musculoskeletal: No joint swelling, erythema or tenderness.  Neurologic: Cranial nerves 2-12 intact, normal strength and sensation.  Psychiatric: Alert, oriented to person, place and time, no obvious anxiety or depression.      Data   Data reviewed today: I reviewed all medications, new labs and imaging results over the last 24 hours. I personally reviewed the EKG tracing showing sinus bradycardica with AV dissociation and idioventricular rhythm.    Recent Labs   Lab 08/01/22  1521 08/01/22  1415 07/27/22  0822   WBC  --  7.2 8.4   HGB  --  13.4 13.9   MCV  --  89 90   PLT  --  255 290   INR  --  2.34* 2.5*     --  137   POTASSIUM 4.3  --  4.6   CHLORIDE 107  --  106   CO2 28  --  24   BUN 17  --  21   CR 0.93  --  1.02   ANIONGAP 2*  --  7   AWILDA 9.0  --  9.6   *  --  122*   ALBUMIN  --   --  4.0   PROTTOTAL  --   --  7.5   BILITOTAL  --   --  0.4   ALKPHOS  --   --  56   ALT  --   --  28   AST  --   --  27     Recent Results (from the past 24 hour(s))   CT Head w/o Contrast    Narrative    CT SCAN OF THE HEAD WITHOUT CONTRAST   8/1/2022 3:40 PM     HISTORY: Headache, loss of vision which is on/off.    TECHNIQUE:  Axial images of the head and coronal reformations without  IV contrast material. Radiation dose for this scan was reduced using  automated exposure control, adjustment of the mA and/or kV according  to patient size, or iterative reconstruction  technique.    COMPARISON: None.    FINDINGS:  Mild volume loss is present. White matter hypoattenuation likely  represents mild chronic small vessel ischemic change. The cerebral  hemispheres, brainstem, and cerebellum otherwise demonstrate normal  morphology and attenuation. No evidence of acute ischemia, hemorrhage,  mass, mass effect or hydrocephalus. The visualized calvarium, tympanic  cavities, mastoid cavities, and paranasal sinuses are unremarkable.      Impression    IMPRESSION:   No CT evidence of acute intracranial abnormality.    KAREN MENDENHALL MD         SYSTEM ID:  [id number]   CTA Head Neck with Contrast    Narrative    CT ANGIOGRAM OF THE HEAD AND NECK WITH CONTRAST  8/1/2022 3:44 PM     HISTORY: Headache, transient loss of vision.    TECHNIQUE:  CT angiography with an injection of 75mL [id number]-[id number] IV  with scans through the head and neck. Images were transferred to a  separate 3-D workstation where multiplanar reformations and 3-D images  were created. Estimates of carotid stenoses are made relative to the  distal internal carotid artery diameters except as noted. Radiation  dose for this scan was reduced using automated exposure control,  adjustment of the mA and/or kV according to patient size, or iterative  reconstruction technique.    COMPARISON: None.     CT ANGIOGRAM HEAD FINDINGS:    The vertebral arteries, basilar artery, and posterior cerebral  arteries are patent. The internal carotid arteries, anterior cerebral  arteries, and middle cerebral arteries are patent. Extensive  atherosclerotic plaquing involving the bilateral carotid siphons with  an area of relative hypoattenuation within the right internal carotid  artery at the cavernous segment (series 6 image 414). No aneurysm or  vascular malformation is identified.    CT ANGIOGRAM NECK FINDINGS:   The bilateral common carotid, internal carotid, external carotid, and  vertebral arteries are patent. Scattered atherosclerotic  plaquing.  Moderate stenosis at the left vertebral artery origin. No evidence of  flow-limiting stenosis at the carotid bifurcations. No evidence of  dissection.     INCIDENTAL FINDINGS: Severe cervical spine degenerative change with  multiple severe stenoses. Cardiac surgery change. Scattered  nonspecific pulmonary opacities. Asymmetric hyperattenuation and  multiple calcifications involving the right parotid gland.      Impression    IMPRESSION:   CTA Head:   1. Extensive atherosclerotic plaquing involving the carotid siphons.  2. Filling defect within the right internal carotid artery at the  cavernous segment which could represent exophytic plaque or  nonocclusive thrombus.   CTA Neck:   1. Moderate stenosis at the left vertebral artery origin.  2. Otherwise, no evidence of large vessel occlusion or high-grade  stenosis.   3. Incidental findings as detailed.    KAREN MENDENHALL MD         SYSTEM ID:  [id number]

## 2022-08-01 NOTE — CONSULTS
"      [facility]    Stroke Telephone Note    I was called by Malik Rodríguez on 08/01/22 regarding patient Fausto Farr. The patient is a 82 year old male w/ PMHX of CABG, HTN ,HLD, Afib on Coumadin who presents with lightheadedness and dizziness since Saturday. He was outside when he had an episode of lightheadedness, nausea and diaphoresis. He has developed a right orbital headache with intermittent vision changes in just the right eye along the temporal visual field. He had an episode of the transient vision change. INR 2.34    Imaging Findings   Head CT                                                                   IMPRESSION:   No CT evidence of acute intracranial abnormality.    IMPRESSION:   CTA Head:   1. Extensive atherosclerotic plaquing involving the carotid siphons.  2. Filling defect within the right internal carotid artery at the  cavernous segment which could represent exophytic plaque or  nonocclusive thrombus.   CTA Neck:   1. Moderate stenosis at the left vertebral artery origin.  2. Otherwise, no evidence of large vessel occlusion or high-grade  stenosis.   3. Incidental findings as detailed.      Impression  Transient vision changes-could be a stuttering branch retinal artery occlusion vs retinal detachement vs other ocular pathology vs less likely complex migraine. Currently anticoagulated on Warfarin with an INR of 2.34    Recommendations     MRI brain WWO  If stroke then will need further stroke work-up   Optho consult    My recommendations are based on the information provided over the phone by Fausto Farr's in-person providers. They are not intended to replace the clinical judgment of his in-person providers. I was not requested to personally see or examine the patient at this time.    The Stroke Staff is Dr. Huerta.    Imelda Beltrán MD  Vascular Neurology Fellow  To page me or covering stroke neurology team member, click here: AMCOM   Choose \"On Call\" " "tab at top, then search dropdown box for \"Neurology Adult\", select location, press Enter, then look for stroke/neuro ICU/telestroke.        "

## 2022-08-01 NOTE — PHARMACY-ADMISSION MEDICATION HISTORY
Pharmacy Medication History  Admission medication history interview status for the 8/1/2022  admission is complete. See EPIC admission navigator for prior to admission medications     Location of Interview: Patient room  Medication history sources: Patient and Surescripts    Significant changes made to the medication list:  Added augmentin  Modified topicals to prn  Removed atropine and pred forte eye drops    In the past week, patient estimated taking medication this percent of the time: greater than 90%    Additional medication history information:   none    Medication reconciliation completed by provider prior to medication history? No    Time spent in this activity: 15 minutes    Prior to Admission medications    Medication Sig Last Dose Taking? Auth Provider Long Term End Date   acetaminophen (TYLENOL) 500 MG tablet Take 500-1,000 mg by mouth every 6 hours as needed for pain prn Yes Unknown, Entered By History     amoxicillin-clavulanate (AUGMENTIN) 875-125 MG tablet Take 1 tablet by mouth every morning 8/1/2022 at am Yes Unknown, Entered By History     betamethasone valerate (VALISONE) 0.1 % external lotion Apply topically 2 times daily Apply topically to scalp twice daily PRN prn Yes Jodi Flower Mai, MD     cholecalciferol 25 MCG (1000 UT) TABS Take 1,000 Units by mouth daily 8/1/2022 at am Yes Unknown, Entered By History     ezetimibe (ZETIA) 10 MG tablet Take 1 tablet (10 mg) by mouth daily 7/31/2022 at pm Yes Jodi Flower Mai, MD Yes    ferrous sulfate (FEROSUL) 325 (65 Fe) MG tablet Take 325 mg by mouth daily (with breakfast) 8/1/2022 at am Yes Unknown, Entered By History     fluocinonide (LIDEX) 0.05 % external ointment Apply topically 2 times daily  Patient taking differently: Apply topically 2 times daily as needed prn Yes Jodi Flower Mai, MD     furosemide (LASIX) 40 MG tablet Take 0.5 tablets (20 mg) by mouth daily 7/31/2022 at pm Yes Jodi Flower Mai, MD Yes    glucosamine-chondroitin  500-400 MG CAPS per capsule Take 1 capsule by mouth every evening 7/31/2022 at pm Yes Unknown, Entered By History     mesalamine (ASACOL HD) 800 MG EC tablet Take 1 tablet (800 mg) by mouth 2 times daily 8/1/2022 at am Yes Jodi Flower Mai, MD     rosuvastatin (CRESTOR) 40 MG tablet Take 1 tablet (40 mg) by mouth daily 8/1/2022 at am Yes Jodi Flower Mai, MD Yes    tamsulosin (FLOMAX) 0.4 MG capsule Take 1 capsule (0.4 mg) by mouth daily 7/31/2022 at pm Yes Jodi Flower Mai, MD     triamcinolone (KENALOG) 0.1 % external cream Apply topically 2 times daily  Patient taking differently: Apply topically 2 times daily as needed prn Yes Jodi Flower Mai, MD     triamcinolone (KENALOG) 0.1 % external lotion Apply topically 2 times daily  Patient taking differently: Apply topically 2 times daily as needed prn Yes Jodi Flower Mai, MD     warfarin ANTICOAGULANT (COUMADIN) 5 MG tablet Take 5mg every Sun & Wed / Take 7.5mg all other days OR per INR clinic 7/31/2022 at pm Yes Jodi Flower Mai, MD     ACE/ARB/ARNI NOT PRESCRIBED (INTENTIONAL) Please choose reason not prescribed from choices below.   Jodi Flower Mai, MD Yes    BETA BLOCKER NOT PRESCRIBED (INTENTIONAL) Please choose reason not prescribed from choices below.   Jodi Flower Mai, MD Yes        The information provided in this note is only as accurate as the sources available at the time of update(s)

## 2022-08-01 NOTE — ED PROVIDER NOTES
History   Chief Complaint:  Dizziness and Loss of Vision       HPI   Fausto Farr is a 82 year old male anticoagulated on Coumadin with status post CABG history of hyperlipidemia, hypertension, Atrial fibrillation, and coronary artery disease who presents with light-headedness and visual disturbance. The patient states he was working out in the yard two days ago when he suddenly developed light-headedness, nausea and felt diaphoretic from the neck up. He then went inside and developed a headache described as behind the right eye. He took Aspirin at this time. He has had intermittent headaches since then and states the headache moved to the left eye today. He is also experiencing intermittent loss of vision in the right eye described as about half of the visual field. He is currently slightly light-headed and has a slight headache. He denies weakness, numbness, gait difficulty or speech difficulty. No recent change in medication. Of note, he has also been in conversation about possible pacemaker placement with his cardiologist due to persistent bradycardia.     Review of Systems   Eyes: Positive for visual disturbance.   Musculoskeletal: Negative for gait problem.   Neurological: Positive for light-headedness and headaches. Negative for speech difficulty, weakness and numbness.   All other systems reviewed and are negative.        Allergies:  No Known Drug Allergies    Medications:  Coumadin  Augmentin  Zetia  Lasix  Crestor  Flomax  Mesalamine     Past Medical History:     Ulcerative colitis  Atrial fibrillation   Hernia  Abdominal aortic aneurysm   Hypertension   Hyperlipidemia  Prostate cancer  Anemia  Coronary artery disease   Sciatica left side  Valvular heart disease  MRSA infection  Spinal stenosis  Lumbar spinal fusion  Obstructive sleep apnea   Congestive heart failure  Systolic  Diabetes mellitus, type II  Lumbago    Past Surgical History:    Aortic valve replacement  Apply wound vac  Arthroplasty  knee x2  Fusion L4-5, laminectomy  Bypass graft artery coronary  Cardiac catheterization  Carpal tunnel release\  Abdominal aortic aneurysm repair  ENT surgery  EP ablation atrial flutter  Varicose vein stripping  Graft flap pedicle extremity  Radioactive seeding  Graft skin split thickness from extremity  Vocal cord polypectomy  I&D knee   Spinal fusion lumbar 3+ levels  Repair mitral valve  CABG    Family History:    Father- coronary artery disease, pacemaker  Brother- heart disease    Social History:  The patient presents to the ED alone  PCP: Jodi Flower MD    Physical Exam     Patient Vitals for the past 24 hrs:   BP Temp Temp src Pulse Resp SpO2   08/01/22 1524 (!) 158/70 -- -- (!) 36 11 94 %   08/01/22 1430 (!) 148/66 -- -- (!) 39 11 99 %   08/01/22 1359 (!) 153/69 -- -- (!) 38 18 96 %   08/01/22 1108 (!) 144/58 98  F (36.7  C) Temporal (!) 47 18 97 %       Physical Exam  GENERAL: well developed, pleasant  HEAD: atraumatic  EYES: pupils reactive, extraocular muscles intact, conjunctivae normal  ENT:  mucus membranes moist  NECK:  trachea midline, normal range of motion  RESPIRATORY: no tachypnea, breath sounds clear to auscultation   CVS: bradycardic, no murmurs, intact distal pulses  ABDOMEN: soft, nontender, nondistention  MUSCULOSKELETAL: no deformities  SKIN: warm and dry, no acute rashes or ulceration  NEURO: GCS 15, cranial nerves intact, alert and oriented x3  PSYCH:  Mood/affect normal    Emergency Department Course   ECG  ECG results from 08/01/22   EKG 12 lead     Value    Systolic Blood Pressure     Diastolic Blood Pressure     Ventricular Rate 46    Atrial Rate 62    WA Interval     QRS Duration 142        QTc 448    P Axis 59    R AXIS -64    T Axis 71    Interpretation ECG      Sinus rhythm with A-V dissociation and Wide QRS rhythm with Premature ventricular complexes or Fusion complexes  Left axis deviation  Right bundle branch block  Inferior infarct , age undetermined  Abnormal  ECG  When compared with ECG of 10-FEB-2020 16:16,  Wide QRS rhythm has replaced Sinus rhythm  Vent. rate has decreased BY  35 BPM  Confirmed by GENERATED REPORT, COMPUTER ([id number]),  Cece Haro ([id number]) on 8/1/2022 12:16:53 PM       *Note: Due to a large number of results and/or encounters for the requested time period, some results have not been displayed. A complete set of results can be found in Results Review.       Imaging:  CTA Head Neck with Contrast   Preliminary Result   IMPRESSION:    CTA Head:    1. Extensive atherosclerotic plaquing involving the carotid siphons.   2. Filling defect within the right internal carotid artery at the   cavernous segment which could represent exophytic plaque or   nonocclusive thrombus.    CTA Neck:    1. Moderate stenosis at the left vertebral artery origin.   2. Otherwise, no evidence of large vessel occlusion or high-grade   stenosis.    3. Incidental findings as detailed.      CT Head w/o Contrast   Final Result   IMPRESSION:    No CT evidence of acute intracranial abnormality.      KAREN MENDENHALL MD            SYSTEM ID:  [id number]        Report per radiology    Laboratory:  Labs Ordered and Resulted from Time of ED Arrival to Time of ED Departure   BASIC METABOLIC PANEL - Abnormal       Result Value    Sodium 137      Potassium 4.3      Chloride 107      Carbon Dioxide (CO2) 28      Anion Gap 2 (*)     Urea Nitrogen 17      Creatinine 0.93      Calcium 9.0      Glucose 101 (*)     GFR Estimate 82     INR - Abnormal    INR 2.34 (*)    COVID-19 VIRUS (CORONAVIRUS) BY PCR - Normal    SARS CoV2 PCR Negative     CBC WITH PLATELETS AND DIFFERENTIAL    WBC Count 7.2      RBC Count 4.59      Hemoglobin 13.4      Hematocrit 41.0      MCV 89      MCH 29.2      MCHC 32.7      RDW 12.3      Platelet Count 255      % Neutrophils 70      % Lymphocytes 17      % Monocytes 10      % Eosinophils 3      % Basophils 0      % Immature Granulocytes 0      NRBCs per 100 WBC 0       Absolute Neutrophils 5.0      Absolute Lymphocytes 1.3      Absolute Monocytes 0.7      Absolute Eosinophils 0.2      Absolute Basophils 0.0      Absolute Immature Granulocytes 0.0      Absolute NRBCs 0.0     COVID-19 VIRUS (CORONAVIRUS) BY PCR     Emergency Department Course:       Reviewed:  I reviewed nursing notes, vitals, past medical history and Care Everywhere    Assessments:  1430 I obtained history and examined the patient as noted above.   1616 I rechecked the patient and explained findings.     Consults:  1432 I spoke with stroke neurology.   1628 I spoke with Dr. Roberts of the hospitalist service.     Interventions:  Medications   iopamidol (ISOVUE-370) solution 75 mL (75 mLs Intravenous Given 8/1/22 1503)   SalineFlush (90 mLs Intravenous Given 8/1/22 1503)      Disposition:  The patient was admitted to the hospital under the care of Dr. Roberts.     Impression & Plan     CMS Diagnoses: None    Medical Decision Making:  Patient presents with episode of lightheadedness in setting of gardening on Saturday.  He is also had transient vision loss in the right temporal region of his right eye.  Patient had a routine physical today and was noted to be in complete heart block and directed here for further work-up.  He notes he had some visual loss when he went to the toilet today here in the ER but now vision is returned to normal.  Patient has extensive cardiac history.  CT CTA is negative for stroke but does show significant atherosclerosis.  INR is therapeutic.  He has no visual cuts at this time.  Patient is asymptomatic although his heart rate is in the upper 30s and did not see any beta-blockers or calcium channel blockers to stop.  He notes its been suggested by the cardiology team that he may need a pacemaker and they have been talking about it for the last 3 years.  Initial EKG was fairly regular without any discernible P waves and he does have a history of atrial fibrillation.  As he has been monitored  here he did have more irregularity with an occasional P wave being noted concerning for complete heart block.  He is still asymptomatic other than the vision changes that occurs when he is up and walking but not while sitting in bed.  Patient be admitted with cardiology consult and neurology consult as well.  Spoke with the hospitalist regarding admission.  Patient should not be up and walking at this point.  Concern is that he is having TIA symptoms when he is up and ambulating with his degree of atherosclerosis and bradycaridia and causing some focal ischemia and many TIAs.  He will likely need pacemaker.    Diagnosis:    ICD-10-CM    1. TIA (transient ischemic attack)  G45.9    2. Bradycardia  R00.1      Scribe Disclosure:  I, Magi Blood, am serving as a scribe at 1:17 PM on 8/1/2022 to document services personally performed by Malik Rodríguez MD based on my observations and the provider's statements to me.              Malik Rodríguez MD  08/01/22 [id number]

## 2022-08-01 NOTE — ED NOTES
[facility]  ED Nurse Handoff Report    ED Chief complaint: Dizziness and Loss of Vision      ED Diagnosis:   Final diagnoses:   TIA (transient ischemic attack)   Bradycardia       Code Status: Prior Full Code status    Allergies:   Allergies   Allergen Reactions     Bees Anaphylaxis       Patient Story: patient was working outdoors in his garden on saturday and started feeling dizzy and had blurry vision. Had to sit down for 10 minutes before he felt good enough to go inside where he stayed for the rest of the day. Patient still having vision issues.   Focused Assessment:  Patient having AV block and BBB with an idioventricular lashae rhythm. All other VS are stable on room air.     Treatments and/or interventions provided: CT taken of head in order to make sure there was no intracraial issues.   Patient's response to treatments and/or interventions: tolerated    To be done/followed up on inpatient unit:  cardiology consult    Does this patient have any cognitive concerns?: none    Activity level - Baseline/Home:  Independent  Activity Level - Current:   Independent    Patient's Preferred language: English   Needed?: No    Isolation: None  Infection: Not Applicable  Patient tested for COVID 19 prior to admission: YES  Bariatric?: No    Vital Signs:   Vitals:    08/01/22 1500 08/01/22 1524 08/01/22 1600 08/01/22 1700   BP:  (!) 158/70     BP Location:  Right arm     Pulse: (!) 36 (!) 36 (!) 37 (!) 37   Resp: 25 11 25 11   Temp:       TempSrc:       SpO2: 99% 94% 97% 98%       Cardiac Rhythm: Bradycardic w/AVB and BBB    Was the PSS-3 completed:   Yes  What interventions are required if any?               Family Comments: Wife at home      For the majority of the shift this patient's behavior was Green.       ED NURSE PHONE NUMBER: *[phone]

## 2022-08-01 NOTE — PROGRESS NOTES
"SUBJECTIVE:   Fausto Farr is a 82 year old male who presents for Preventive Visit.      Patient has been advised of split billing requirements and indicates understanding: Yes  Are you in the first 12 months of your Medicare coverage?  No    Healthy Habits:     In general, how would you rate your overall health?  Good    Frequency of exercise:  4-5 days/week    Duration of exercise:  Greater than 60 minutes    Do you usually eat at least 4 servings of fruit and vegetables a day, include whole grains    & fiber and avoid regularly eating high fat or \"junk\" foods?  Yes    Taking medications regularly:  Yes    Medication side effects:  Not applicable    Ability to successfully perform activities of daily living:  No assistance needed    Home Safety:  No safety concerns identified    Hearing Impairment:  No hearing concerns    In the past 6 months, have you been bothered by leaking of urine?  No    In general, how would you rate your overall mental or emotional health?  Good      PHQ-2 Total Score: 0    Additional concerns today:  Yes      Had incident on Sat when in garden, felt light headed and tired.  Had headache and blurry vision in right eye.  Confused - asking questions over and over.  Is a little more confused still.    No chest pain, SOB.  Overall feels weak.  Blurry vision in right eye.  No LOC.  Still a little confused.    Do you feel safe in your environment? Yes    Have you ever done Advance Care Planning? (For example, a Health Directive, POLST, or a discussion with a medical provider or your loved ones about your wishes): Yes, advance care planning is on file.       Fall risk  Fallen 2 or more times in the past year?: No  Any fall with injury in the past year?: No  click delete button to remove this line now  Cognitive Screening   1) Repeat 3 items (Leader, Season, Table)    2) Clock draw: NORMAL  3) 3 item recall: Recalls 2 objects   Results: NORMAL clock, 1-2 items recalled: COGNITIVE IMPAIRMENT " LESS LIKELY    Mini-CogTM Copyright CARMELO Cedillo. Licensed by the author for use in [facility]; reprinted with permission (sandi@.[facility]). All rights reserved.      Do you have sleep apnea, excessive snoring or daytime drowsiness?: no    Reviewed and updated as needed this visit by clinical staff   Tobacco  Allergies    Med Hx              Reviewed and updated as needed this visit by Provider                   Social History     Tobacco Use     Smoking status: Former Smoker     Packs/day: 1.00     Years: 40.00     Pack years: 40.00     Start date: 4/15/1962     Quit date: 10/23/2002     Years since quittin.7     Smokeless tobacco: Never Used   Substance Use Topics     Alcohol use: Yes     Comment: a couple beers per week (socially)         Alcohol Use 2022   Prescreen: >3 drinks/day or >7 drinks/week? No   Prescreen: >3 drinks/day or >7 drinks/week? -               Current providers sharing in care for this patient include:   Patient Care Team:  Jodi Flower Mai, MD as PCP - General (Internal Medicine)  Calderon Santo MD as MD (Cardiology)  Carla Dao APRN CNP as Nurse Practitioner (Nurse Practitioner)  Walt Garcia MD as MD (Orthopedics)  Caldeorn Santo MD as Assigned Heart and Vascular Provider  Juan Carlos Parmar, RN as Personal Advocate & Liaison (PAL)  Jodi Flower Mai, MD as Assigned PCP    The following health maintenance items are reviewed in Epic and correct as of today:  Health Maintenance Due   Topic Date Due     MICROALBUMIN  Never done     DIABETIC FOOT EXAM  Never done     URINE DRUG SCREEN  Never done     EYE EXAM  Never done     HF ACTION PLAN  2019     COVID-19 Vaccine (4 - Booster for Moderna series) 2022     ANNUAL REVIEW OF HM ORDERS  2022               Review of Systems   Constitutional: Negative for chills and fever.   HENT: Positive for hearing loss. Negative for congestion, ear pain and sore throat.    Eyes: Positive for visual  "disturbance. Negative for pain.   Respiratory: Positive for shortness of breath. Negative for cough.    Cardiovascular: Negative for chest pain, palpitations and peripheral edema.   Gastrointestinal: Positive for nausea. Negative for abdominal pain, constipation, diarrhea, heartburn and hematochezia.   Genitourinary: Negative for dysuria, frequency, genital sores, hematuria, impotence, penile discharge and urgency.   Musculoskeletal: Positive for arthralgias. Negative for joint swelling and myalgias.   Skin: Negative for rash.   Neurological: Positive for dizziness and headaches. Negative for weakness and paresthesias.   Psychiatric/Behavioral: Negative for mood changes. The patient is not nervous/anxious.          OBJECTIVE:   BP (!) 164/64   Pulse (!) 46   Temp 97.4  F (36.3  C) (Tympanic)   Wt 98.4 kg (217 lb)   SpO2 96%   BMI 35.56 kg/m   Estimated body mass index is 35.56 kg/m  as calculated from the following:    Height as of 7/26/21: 1.664 m (5' 5.5\").    Weight as of this encounter: 98.4 kg (217 lb).  Physical Exam  GENERAL: healthy, alert and no distress  NECK: no adenopathy, no asymmetry, masses, or scars and thyroid normal to palpation  RESP: lungs clear to auscultation - no rales, rhonchi or wheezes  CV: bradycardia, normal S1 S2, no S3 or S4, no murmur, click or rub, peripheral pulses strong and no peripheral edema  MS: no gross musculoskeletal defects noted, no edema    Diagnostic Test Results:  Labs reviewed in Epic  EKG: 3rd degree block    ASSESSMENT / PLAN:     DEFERRED WELLNESS VISIT DUE TO ACUTE ISSUES BELOW    Pt with extensive cardiac history including MVR and AVR on anticoagulation, CAD s/p cabg, afib, heart failure, PVD s/p AAA repair with new-onset bradycardia due to complete heart block per my EKG read - I sent pt to ED for management and evaluation of right visual blurriness, as I am concerned about perfusion.      ICD-10-CM    1. Dizziness with Third degree heart block by " "electrocardiogram (H)     - Pt reports dizziness, confusion, generalized weakness and visual changes since Sat - appears to have 3rd degree AV block with vent rate ~ 46.  - Send to ED - pt likely needs pacemaker- pt will have neighbor drive him.  I emphasized importance of going now.  - called over to ED for verbal hand off.   I44.2 EKG 12-lead complete w/read - Clinics   2. Unilateral Vision changes  H53.9     - Right sided blurriness  - perfusion concern - recommend this be evaluated in ER too due to hx of significant PVD.   3. Persistent atrial fibrillation (H)  I48.19    4. Chronic diastolic congestive heart failure (H)  I50.32 ACE/ARB/ARNI NOT PRESCRIBED (INTENTIONAL)     furosemide (LASIX) 40 MG tablet   5. PVD (peripheral vascular disease) (H)  I73.9 BETA BLOCKER NOT PRESCRIBED (INTENTIONAL)   6. S/P mitral valve replacement  Z95.2    7. S/P aortic valve replacement  Z95.2    8. Long term current use of anticoagulants with INR goal of 2.5-3.5  Z79.01    9. 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 Urinary retention  R33.9 tamsulosin (FLOMAX) 0.4 MG capsule   11. High priority for 2019-nCoV vaccine  Z23    12. Type 2 diabetes mellitus without complication, without long-term current use of insulin (H)  E11.9 OPTOMETRY REFERRAL   13. Mixed hyperlipidemia  E78.2 ezetimibe (ZETIA) 10 MG tablet     rosuvastatin (CRESTOR) 40 MG tablet     Pt is due for refills of his chronic meds, which I have provided today, however we will need to reschedule his physical at a later date.      COUNSELING:  All other systems on a 10-point review are negative, unless otherwise noted in HPI      Estimated body mass index is 35.56 kg/m  as calculated from the following:    Height as of 7/26/21: 1.664 m (5' 5.5\").    Weight as of this encounter: 98.4 kg (217 lb).        He reports that he quit smoking about 19 years ago. He started smoking about 60 years ago. He has a 40.00 " pack-year smoking history. He has never used smokeless tobacco.      Appropriate preventive services were discussed with this patient, including applicable screening as appropriate for cardiovascular disease, diabetes, osteopenia/osteoporosis, and glaucoma.  As appropriate for age/gender, discussed screening for colorectal cancer, prostate cancer, breast cancer, and cervical cancer. Checklist reviewing preventive services available has been given to the patient.    Reviewed patients plan of care and provided an AVS. The Complex Care Plan (for patients with higher acuity and needing more deliberate coordination of services) for Fausto meets the Care Plan requirement. This Care Plan has been established and reviewed with the Patient.    Counseling Resources:  ATP IV Guidelines  Pooled Cohorts Equation Calculator  Breast Cancer Risk Calculator  Breast Cancer: Medication to Reduce Risk  FRAX Risk Assessment  ICSI Preventive Guidelines  Dietary Guidelines for Americans, 2010  USDA's MyPlate  ASA Prophylaxis  Lung CA Screening    Chris Flower MD  [facility]    Identified Health Risks:

## 2022-08-01 NOTE — ED TRIAGE NOTES
Pt complains of dizziness, weakness, a HA, and blurred vision in his rigtht eye that started last Saturday when he was working in his garden.  Pt seen by PCP this am.     Triage Assessment     Row Name 08/01/22 1106       Triage Assessment (Adult)    Airway WDL WDL       Respiratory WDL    Respiratory WDL WDL       Skin Circulation/Temperature WDL    Skin Circulation/Temperature WDL WDL       Cardiac WDL    Cardiac WDL WDL       Peripheral/Neurovascular WDL    Peripheral Neurovascular WDL WDL       Cognitive/Neuro/Behavioral WDL    Cognitive/Neuro/Behavioral WDL WDL

## 2022-08-02 ENCOUNTER — APPOINTMENT (OUTPATIENT)
Dept: MRI IMAGING | Facility: CLINIC | Age: 82
DRG: 242 | End: 2022-08-02
Attending: STUDENT IN AN ORGANIZED HEALTH CARE EDUCATION/TRAINING PROGRAM
Payer: MEDICARE

## 2022-08-02 LAB
ANION GAP SERPL CALCULATED.3IONS-SCNC: 7 MMOL/L (ref 3–14)
BUN SERPL-MCNC: 14 MG/DL (ref 7–30)
CALCIUM SERPL-MCNC: 8.8 MG/DL (ref 8.5–10.1)
CHLORIDE BLD-SCNC: 108 MMOL/L (ref 94–109)
CO2 SERPL-SCNC: 25 MMOL/L (ref 20–32)
CREAT SERPL-MCNC: 0.88 MG/DL (ref 0.66–1.25)
ERYTHROCYTE [DISTWIDTH] IN BLOOD BY AUTOMATED COUNT: 12.5 % (ref 10–15)
ERYTHROCYTE [DISTWIDTH] IN BLOOD BY AUTOMATED COUNT: 12.5 % (ref 10–15)
GFR SERPL CREATININE-BSD FRML MDRD: 86 ML/MIN/1.73M2
GLUCOSE BLD-MCNC: 96 MG/DL (ref 70–99)
GLUCOSE BLDC GLUCOMTR-MCNC: 131 MG/DL (ref 70–99)
GLUCOSE BLDC GLUCOMTR-MCNC: 86 MG/DL (ref 70–99)
GLUCOSE BLDC GLUCOMTR-MCNC: 89 MG/DL (ref 70–99)
HCT VFR BLD AUTO: 37.5 % (ref 40–53)
HCT VFR BLD AUTO: 40.3 % (ref 40–53)
HGB BLD-MCNC: 12.3 G/DL (ref 13.3–17.7)
HGB BLD-MCNC: 13 G/DL (ref 13.3–17.7)
INR PPP: 1.78 (ref 0.85–1.15)
INR PPP: 1.94 (ref 0.85–1.15)
MCH RBC QN AUTO: 29 PG (ref 26.5–33)
MCH RBC QN AUTO: 29.1 PG (ref 26.5–33)
MCHC RBC AUTO-ENTMCNC: 32.3 G/DL (ref 31.5–36.5)
MCHC RBC AUTO-ENTMCNC: 32.8 G/DL (ref 31.5–36.5)
MCV RBC AUTO: 89 FL (ref 78–100)
MCV RBC AUTO: 90 FL (ref 78–100)
PLATELET # BLD AUTO: 251 10E3/UL (ref 150–450)
PLATELET # BLD AUTO: 260 10E3/UL (ref 150–450)
POTASSIUM BLD-SCNC: 3.9 MMOL/L (ref 3.4–5.3)
RBC # BLD AUTO: 4.22 10E6/UL (ref 4.4–5.9)
RBC # BLD AUTO: 4.49 10E6/UL (ref 4.4–5.9)
SODIUM SERPL-SCNC: 140 MMOL/L (ref 133–144)
UFH PPP CHRO-ACNC: 0.34 IU/ML
UFH PPP CHRO-ACNC: 0.39 IU/ML
UFH PPP CHRO-ACNC: 0.42 IU/ML
WBC # BLD AUTO: 6.3 10E3/UL (ref 4–11)
WBC # BLD AUTO: 7.6 10E3/UL (ref 4–11)

## 2022-08-02 PROCEDURE — 36415 COLL VENOUS BLD VENIPUNCTURE: CPT | Performed by: STUDENT IN AN ORGANIZED HEALTH CARE EDUCATION/TRAINING PROGRAM

## 2022-08-02 PROCEDURE — 36415 COLL VENOUS BLD VENIPUNCTURE: CPT | Performed by: INTERNAL MEDICINE

## 2022-08-02 PROCEDURE — 93010 ELECTROCARDIOGRAM REPORT: CPT | Mod: 59 | Performed by: INTERNAL MEDICINE

## 2022-08-02 PROCEDURE — 99153 MOD SED SAME PHYS/QHP EA: CPT | Performed by: INTERNAL MEDICINE

## 2022-08-02 PROCEDURE — 255N000002 HC RX 255 OP 636: Performed by: STUDENT IN AN ORGANIZED HEALTH CARE EDUCATION/TRAINING PROGRAM

## 2022-08-02 PROCEDURE — 02H63JZ INSERTION OF PACEMAKER LEAD INTO RIGHT ATRIUM, PERCUTANEOUS APPROACH: ICD-10-PCS | Performed by: INTERNAL MEDICINE

## 2022-08-02 PROCEDURE — 250N000011 HC RX IP 250 OP 636: Performed by: STUDENT IN AN ORGANIZED HEALTH CARE EDUCATION/TRAINING PROGRAM

## 2022-08-02 PROCEDURE — 80048 BASIC METABOLIC PNL TOTAL CA: CPT | Performed by: STUDENT IN AN ORGANIZED HEALTH CARE EDUCATION/TRAINING PROGRAM

## 2022-08-02 PROCEDURE — 85610 PROTHROMBIN TIME: CPT | Performed by: STUDENT IN AN ORGANIZED HEALTH CARE EDUCATION/TRAINING PROGRAM

## 2022-08-02 PROCEDURE — 250N000013 HC RX MED GY IP 250 OP 250 PS 637: Performed by: HOSPITALIST

## 2022-08-02 PROCEDURE — C1894 INTRO/SHEATH, NON-LASER: HCPCS | Performed by: INTERNAL MEDICINE

## 2022-08-02 PROCEDURE — 99152 MOD SED SAME PHYS/QHP 5/>YRS: CPT | Performed by: INTERNAL MEDICINE

## 2022-08-02 PROCEDURE — 250N000011 HC RX IP 250 OP 636: Performed by: INTERNAL MEDICINE

## 2022-08-02 PROCEDURE — C1898 LEAD, PMKR, OTHER THAN TRANS: HCPCS | Performed by: INTERNAL MEDICINE

## 2022-08-02 PROCEDURE — G1010 CDSM STANSON: HCPCS

## 2022-08-02 PROCEDURE — 85027 COMPLETE CBC AUTOMATED: CPT | Performed by: STUDENT IN AN ORGANIZED HEALTH CARE EDUCATION/TRAINING PROGRAM

## 2022-08-02 PROCEDURE — C1785 PMKR, DUAL, RATE-RESP: HCPCS | Performed by: INTERNAL MEDICINE

## 2022-08-02 PROCEDURE — 99223 1ST HOSP IP/OBS HIGH 75: CPT | Mod: FS | Performed by: STUDENT IN AN ORGANIZED HEALTH CARE EDUCATION/TRAINING PROGRAM

## 2022-08-02 PROCEDURE — 99223 1ST HOSP IP/OBS HIGH 75: CPT | Mod: 25 | Performed by: INTERNAL MEDICINE

## 2022-08-02 PROCEDURE — 210N000002 HC R&B HEART CARE

## 2022-08-02 PROCEDURE — 33208 INSRT HEART PM ATRIAL & VENT: CPT | Performed by: INTERNAL MEDICINE

## 2022-08-02 PROCEDURE — 02HK3JZ INSERTION OF PACEMAKER LEAD INTO RIGHT VENTRICLE, PERCUTANEOUS APPROACH: ICD-10-PCS | Performed by: INTERNAL MEDICINE

## 2022-08-02 PROCEDURE — A9585 GADOBUTROL INJECTION: HCPCS | Performed by: STUDENT IN AN ORGANIZED HEALTH CARE EDUCATION/TRAINING PROGRAM

## 2022-08-02 PROCEDURE — 85610 PROTHROMBIN TIME: CPT | Performed by: INTERNAL MEDICINE

## 2022-08-02 PROCEDURE — 250N000009 HC RX 250: Performed by: INTERNAL MEDICINE

## 2022-08-02 PROCEDURE — 99356 PR PROLONGED SERV,INPATIENT,1ST HR: CPT | Mod: FS | Performed by: STUDENT IN AN ORGANIZED HEALTH CARE EDUCATION/TRAINING PROGRAM

## 2022-08-02 PROCEDURE — 250N000013 HC RX MED GY IP 250 OP 250 PS 637: Performed by: STUDENT IN AN ORGANIZED HEALTH CARE EDUCATION/TRAINING PROGRAM

## 2022-08-02 PROCEDURE — 272N000001 HC OR GENERAL SUPPLY STERILE: Performed by: INTERNAL MEDICINE

## 2022-08-02 PROCEDURE — 93005 ELECTROCARDIOGRAM TRACING: CPT

## 2022-08-02 PROCEDURE — 85520 HEPARIN ASSAY: CPT | Performed by: STUDENT IN AN ORGANIZED HEALTH CARE EDUCATION/TRAINING PROGRAM

## 2022-08-02 PROCEDURE — 250N000013 HC RX MED GY IP 250 OP 250 PS 637: Performed by: INTERNAL MEDICINE

## 2022-08-02 PROCEDURE — 85027 COMPLETE CBC AUTOMATED: CPT | Performed by: INTERNAL MEDICINE

## 2022-08-02 PROCEDURE — 99233 SBSQ HOSP IP/OBS HIGH 50: CPT | Performed by: HOSPITALIST

## 2022-08-02 PROCEDURE — 0JH606Z INSERTION OF PACEMAKER, DUAL CHAMBER INTO CHEST SUBCUTANEOUS TISSUE AND FASCIA, OPEN APPROACH: ICD-10-PCS | Performed by: INTERNAL MEDICINE

## 2022-08-02 DEVICE — LEAD INGEVITY+ AF IS1 7840 4CM: Type: IMPLANTABLE DEVICE | Status: FUNCTIONAL

## 2022-08-02 DEVICE — LEAD INGEVITY+ AF IS1 7841 52CM: Type: IMPLANTABLE DEVICE | Status: FUNCTIONAL

## 2022-08-02 DEVICE — PCMKR CARD ACCOLADE MRI EL DR: Type: IMPLANTABLE DEVICE | Status: FUNCTIONAL

## 2022-08-02 RX ORDER — CEFAZOLIN SODIUM 1 G/3ML
1 INJECTION, POWDER, FOR SOLUTION INTRAMUSCULAR; INTRAVENOUS
Status: DISCONTINUED | OUTPATIENT
Start: 2022-08-02 | End: 2022-08-02 | Stop reason: HOSPADM

## 2022-08-02 RX ORDER — MIDAZOLAM HCL IN 0.9 % NACL/PF 1 MG/ML
.5-6 PLASTIC BAG, INJECTION (ML) INTRAVENOUS CONTINUOUS PRN
Status: DISCONTINUED | OUTPATIENT
Start: 2022-08-02 | End: 2022-08-02 | Stop reason: HOSPADM

## 2022-08-02 RX ORDER — SODIUM CHLORIDE 450 MG/100ML
INJECTION, SOLUTION INTRAVENOUS CONTINUOUS
Status: DISCONTINUED | OUTPATIENT
Start: 2022-08-02 | End: 2022-08-02 | Stop reason: HOSPADM

## 2022-08-02 RX ORDER — ONDANSETRON 2 MG/ML
4 INJECTION INTRAMUSCULAR; INTRAVENOUS EVERY 6 HOURS PRN
Status: DISCONTINUED | OUTPATIENT
Start: 2022-08-02 | End: 2022-08-03 | Stop reason: HOSPADM

## 2022-08-02 RX ORDER — EZETIMIBE 10 MG/1
10 TABLET ORAL DAILY
Status: DISCONTINUED | OUTPATIENT
Start: 2022-08-02 | End: 2022-08-03 | Stop reason: HOSPADM

## 2022-08-02 RX ORDER — ACETAMINOPHEN 325 MG/1
650 TABLET ORAL EVERY 4 HOURS PRN
Status: DISCONTINUED | OUTPATIENT
Start: 2022-08-02 | End: 2022-08-03 | Stop reason: HOSPADM

## 2022-08-02 RX ORDER — TAMSULOSIN HYDROCHLORIDE 0.4 MG/1
0.4 CAPSULE ORAL DAILY
Status: DISCONTINUED | OUTPATIENT
Start: 2022-08-02 | End: 2022-08-03 | Stop reason: HOSPADM

## 2022-08-02 RX ORDER — WARFARIN SODIUM 7.5 MG/1
7.5 TABLET ORAL ONCE
Status: COMPLETED | OUTPATIENT
Start: 2022-08-02 | End: 2022-08-02

## 2022-08-02 RX ORDER — FENTANYL CITRATE 50 UG/ML
INJECTION, SOLUTION INTRAMUSCULAR; INTRAVENOUS
Status: DISCONTINUED | OUTPATIENT
Start: 2022-08-02 | End: 2022-08-02 | Stop reason: HOSPADM

## 2022-08-02 RX ORDER — FUROSEMIDE 20 MG
20 TABLET ORAL DAILY
Status: DISCONTINUED | OUTPATIENT
Start: 2022-08-02 | End: 2022-08-03 | Stop reason: HOSPADM

## 2022-08-02 RX ORDER — ONDANSETRON 4 MG/1
4 TABLET, ORALLY DISINTEGRATING ORAL EVERY 6 HOURS PRN
Status: DISCONTINUED | OUTPATIENT
Start: 2022-08-02 | End: 2022-08-03 | Stop reason: HOSPADM

## 2022-08-02 RX ORDER — GADOBUTROL 604.72 MG/ML
10 INJECTION INTRAVENOUS ONCE
Status: COMPLETED | OUTPATIENT
Start: 2022-08-02 | End: 2022-08-02

## 2022-08-02 RX ORDER — MESALAMINE 800 MG/1
800 TABLET, DELAYED RELEASE ORAL 2 TIMES DAILY
Status: DISCONTINUED | OUTPATIENT
Start: 2022-08-02 | End: 2022-08-03 | Stop reason: HOSPADM

## 2022-08-02 RX ORDER — LIDOCAINE 40 MG/G
CREAM TOPICAL
Status: DISCONTINUED | OUTPATIENT
Start: 2022-08-02 | End: 2022-08-03 | Stop reason: HOSPADM

## 2022-08-02 RX ORDER — BUPIVACAINE HYDROCHLORIDE 2.5 MG/ML
INJECTION, SOLUTION EPIDURAL; INFILTRATION; INTRACAUDAL
Status: DISCONTINUED | OUTPATIENT
Start: 2022-08-02 | End: 2022-08-02 | Stop reason: HOSPADM

## 2022-08-02 RX ORDER — ROSUVASTATIN CALCIUM 20 MG/1
40 TABLET, COATED ORAL DAILY
Status: DISCONTINUED | OUTPATIENT
Start: 2022-08-02 | End: 2022-08-03 | Stop reason: HOSPADM

## 2022-08-02 RX ORDER — LIDOCAINE 40 MG/G
CREAM TOPICAL
Status: DISCONTINUED | OUTPATIENT
Start: 2022-08-02 | End: 2022-08-02 | Stop reason: HOSPADM

## 2022-08-02 RX ORDER — CEFAZOLIN SODIUM 2 G/100ML
2 INJECTION, SOLUTION INTRAVENOUS
Status: COMPLETED | OUTPATIENT
Start: 2022-08-02 | End: 2022-08-02

## 2022-08-02 RX ORDER — ACETAMINOPHEN 500 MG
500-1000 TABLET ORAL EVERY 6 HOURS PRN
Status: DISCONTINUED | OUTPATIENT
Start: 2022-08-02 | End: 2022-08-03

## 2022-08-02 RX ADMIN — FUROSEMIDE 20 MG: 20 TABLET ORAL at 08:41

## 2022-08-02 RX ADMIN — GADOBUTROL 10 ML: 604.72 INJECTION INTRAVENOUS at 09:36

## 2022-08-02 RX ADMIN — HEPARIN SODIUM 1200 UNITS/HR: 10000 INJECTION, SOLUTION INTRAVENOUS at 08:40

## 2022-08-02 RX ADMIN — ACETAMINOPHEN 1000 MG: 500 TABLET, FILM COATED ORAL at 15:03

## 2022-08-02 RX ADMIN — TAMSULOSIN HYDROCHLORIDE 0.4 MG: 0.4 CAPSULE ORAL at 08:41

## 2022-08-02 RX ADMIN — ACETAMINOPHEN 500 MG: 500 TABLET, FILM COATED ORAL at 01:05

## 2022-08-02 RX ADMIN — EZETIMIBE 10 MG: 10 TABLET ORAL at 08:40

## 2022-08-02 RX ADMIN — MESALAMINE 800 MG: 800 TABLET, DELAYED RELEASE ORAL at 20:01

## 2022-08-02 RX ADMIN — AMOXICILLIN AND CLAVULANATE POTASSIUM 1 TABLET: 875; 125 TABLET, FILM COATED ORAL at 08:41

## 2022-08-02 RX ADMIN — ROSUVASTATIN CALCIUM 40 MG: 20 TABLET, FILM COATED ORAL at 08:41

## 2022-08-02 RX ADMIN — OXYCODONE HYDROCHLORIDE 2.5 MG: 5 TABLET ORAL at 20:31

## 2022-08-02 RX ADMIN — ACETAMINOPHEN 1000 MG: 500 TABLET, FILM COATED ORAL at 08:57

## 2022-08-02 RX ADMIN — CEFAZOLIN SODIUM 2 G: 2 INJECTION, SOLUTION INTRAVENOUS at 11:00

## 2022-08-02 RX ADMIN — MESALAMINE 800 MG: 800 TABLET, DELAYED RELEASE ORAL at 08:41

## 2022-08-02 RX ADMIN — WARFARIN SODIUM 7.5 MG: 7.5 TABLET ORAL at 13:16

## 2022-08-02 ASSESSMENT — ACTIVITIES OF DAILY LIVING (ADL)
ADLS_ACUITY_SCORE: 37
ADLS_ACUITY_SCORE: 37
ADLS_ACUITY_SCORE: 35
ADLS_ACUITY_SCORE: 37
ADLS_ACUITY_SCORE: 35
ADLS_ACUITY_SCORE: 37

## 2022-08-02 NOTE — PLAN OF CARE
"Goal Outcome Evaluation:    Neuro- x4, intermittent blurriness to R eye   Most Recent Vitals- /51   Pulse (!) 38   Temp 98  F (36.7  C) (Oral)   Resp 16   Ht 1.664 m (5' 5.5\")   SpO2 98%   BMI 35.56 kg/m    Tele/Cardiac- CHB   Resp- RA  Activity- sba, activity minimized  Pain- denies  Drips- Heparin infusing at 1200 units/hr, recheck at 0800  Skin- WDL  GI/- WDL  Diet NPO  Plan- MRI, cardiology consult, possible PPM in AM      "

## 2022-08-02 NOTE — PROGRESS NOTES
82 year-old white male, presented to ER for visual disturbance, found to have complete AV block.  Not on bradycardia medications.  History of CABG, normal EF.  Previous AFL ablation. Subsequent paroxysmal AF. On warfarin.  SP mechanical mitral and aortic valve replacement.  Hypertension, hiatal hernia, gastric ulcer, ulcerative colitis, GAGE, prostate cancer, PVD, AAA repair.    Recommend pacemaker implantation today.  May need further evaluation and management for intracranial, vertebral and carotid artery disease.

## 2022-08-02 NOTE — PROGRESS NOTES
A&Ox4. VS stable on RA. Tele complete heart block in the 40s. Denies chest pain/SOB. SBA in room. C/o generalized headache, received tylenol. MRI completed. Plan for pacemaker today 8/2. Handoff to [facility].

## 2022-08-02 NOTE — CONSULTS
[facility]    Stroke Consult Note    Reason for Consult:  Stroke     Chief Complaint: Dizziness and Loss of Vision       HPI  Fausto Farr is a 82 year old male with PMH of CAD s/p CABG, HTN, HLD and atrial fibrillation on coumadin.  He presented to the ED 8/1 for evaluation of lightheadedness, dizziness and right visual field deficit since 7/30. INR recently subtherapeutic (goal 2.5-3.5). He was found to have persistent AV block with plans for pacemaker implantation. MRI also with small occipital infarct.    Ralph had pacemaker placed earlier today. He denies residual visual symptoms, no visual field deficits seen on exam. Limited exam on left side due to pacemaker placement, but no other focal findings identified. He notes his INR goal is 2.5-3.5 due to a two mechanical heart valves; he was subtherapeutic (2.34) at time of presentation.     Stroke Evaluation Summarized    MRI/Head CT MRI: small acute infarct of left occipital lobe; multiple chronic micro hemorrhages in cortical/subcortical distribution     Intracranial Vasculature CTA: extensive plaque of bilateral carotid siphons, filling defect within cavernous right ICA   Cervical Vasculature CTA: moderate left vert artery stenosis      Echocardiogram pending   EKG/Telemetry Sinus rhythm, RBBB   Other Testing Not Applicable     LDL  7/27/2022: 64 mg/dL   A1C  7/27/2022: 6.5 %   Troponin No lab value available in past 48 hrs       Impression  1. Subacute left occipital infarct. Suspect secondary to cardioembolism (atrial fibrillation) in the setting of subtherapeutic INR vs less likely intracranial atherosclerosis     Recommendations  - Neurochecks and Vital Signs every 4 hours  -continue coumadin for anticoagulation, INR goal 2.5-3.5 given mechanical valves   - Goal BP is <130/80 with tighter control associated with improved vascular outcomes  - LDL 64 (goal 40-70); continue PTA Crestor and zetia  -Blood glucose monitoring,  "Hgb A1c 6.5%, (goal <7% for secondary stroke prevention), follow-up with PCP  - TTE, pending   - Bedside Glucose Monitoring  - PT/OT/SLP  - Stroke Education  - Euthermia, Euglycemia    Patient Follow-up    - in the next 1-2 week(s) with PCP  - in 6-8 weeks with [facility] [phone] (neurology referral order placed)    Thank you for this consult.Thank you for this consult. We will follow peripherally for results of the echocardiogram and if no concerns then will sign off. Please contact us with any additional questions.    Maura ANGULO, CNP  Vascular Neurology  To page me or covering stroke neurology team member, click here: [facility]COM   Choose \"On Call\" tab at top, then search dropdown box for \"Neurology Adult\", select location, press Enter, then look for stroke/neuro ICU/telestroke.  _____________________________________________________    Clinically Significant Risk Factors Present on Admission              # Coagulation Defect: home medication list includes an anticoagulant medication      Past Medical History   Past Medical History:   Diagnosis Date     Abdominal pain      Anemia     currently taking iron     Back pain     since 1980     BPH (benign prostatic hyperplasia)      Bruit      CAD (coronary artery disease)      Cellulitis 10/18/2012     Cellulitis 05/2018    GrpB strep LLE cellulitis  negative RACHAEL for veg     Chronic venous insufficiency     bilat lower extremities     Contact dermatitis and other eczema, due to unspecified cause      Diaphragmatic hernia without mention of obstruction or gangrene      Diastolic HF (heart failure) (H)      Gastric ulcer      Hypertension 08/06/2013     Lumbago      Mesenteric artery insufficiency (H)     known AAA and narrowing of intestinal artery's  followed by dr raymond     Metabolic syndrome      Mitral valve disease     s/p mechanical MVR, prior mech AVR     Nonallopathic lesion of lumbar region      Nonallopathic lesion of rib cage      " Nonallopathic lesion of sacral region      GAGE (obstructive sleep apnea)     CPAP     Paroxysmal atrial fibrillation (H) 10/18/2012    occured after stopping BB  (prior  aflutter ablation)     Prostate cancer (H) 2008    radiation seed, XRT      PVD (peripheral vascular disease) (H)      Rotator cuff strain     and sprain     S/P AAA repair      S/P aortic valve replacement 2006    developed perivalve leak and MS, therefore redo surg 2013     S/P CABG (coronary artery bypass graft) 2006    Lima-Lad, RA-Rca     Sciatica of left side     since 2000     Sepsis due to group B Streptococcus (H) 05/19/2018     Ulcerative colitis (H)      Varicose veins of bilateral lower extremities with other complications     s/p RLE vein stripping     Vitamin D deficiency      Past Surgical History   Past Surgical History:   Procedure Laterality Date     AORTIC VALVE REPLACEMENT  1/3/06    redo AVR SJM 21mm and SJM MVR 27mm in 2013SJM 21([id number]):AVR, SJM 27 :MVR-     APPLY WOUND VAC N/A 11/12/2019    Procedure: APPLICATION, WOUND VAC;  Surgeon: Sara Martinez MD;  Location:  OR     ARTHROPLASTY KNEE      right knee     ARTHROPLASTY KNEE Right 7/22/2019    Procedure: Right total knee arthroplasty;  Surgeon: Parsanth Jensen MD;  Location:  OR     BACK SURGERY  Oct 2015    Fusion L4-5, laminectomy L2, L3     BYPASS GRAFT ARTERY CORONARY  10/2013    reimplantation of radial artery graft to RCA     CARDIAC CATHERIZATION  11/2005    Stent placed to RCA     CARDIAC CATHERIZATION  04/2013    Occluded RCA, patent LIMA to LAD and radial graft to PDA     CARPAL TUNNEL RELEASE RT/LT  1994     COLONOSCOPY  8-22-11     CYSTOSCOPY FLEXIBLE  10/16/2013    Procedure: CYSTOSCOPY FLEXIBLE;  FLEXIBLE CYSTOSCOPY / DILATION OF URETHRA / INSERTION OF LESLIE;  Surgeon: Cooper Wallace MD;  Location:  OR     ENDOVASCULAR REPAIR, INFRARENAL ABDOMINAL AORTIC ANEURYSM/DISSECTION; MODULAR BIFURCATED PROSTHESIS  2006    AAA repair  endovascular     ENT SURGERY       EP ABLATION ATRIAL FLUTTER N/A 4/22/2019    Procedure: EP Ablation Atrial Flutter;  Surgeon: Jessy Vasquez MD;  Location:  HEART CARDIAC CATH LAB     GENITOURINARY SURGERY  6/16/08    radioactive seeding     GRAFT FLAP PEDICLE EXTREMITY (LOCATION) Right 11/12/2019    Procedure: SURGICAL PROCUREMENT, FLAP, PEDICLE, EXTREMITY;  Surgeon: Sara Martinez MD;  Location:  OR     GRAFT SKIN SPLIT THICKNESS FROM EXTREMITY Right 11/12/2019    Procedure: RIGHT GASTRONEMIUS FLAP TO RIGHT KNEE, SPLIT THICKNESS SKIN GRAFT FROM RIGHT THIGH TO RIGHT KNEE, SURGICAL PROCUREMENT, FLAP, PEDICLE, EXTREMITY, WOUND VAC PLACEMENT;  Surgeon: Sara Martinez MD;  Location:  OR     HEAD & NECK SURGERY  1997    vocal cord polypectomy     INCISION AND DRAINAGE KNEE, COMBINED Right 8/29/2019    Procedure: INCISION AND DRAINAGE, KNEE W/ Secondary Wound Closure;  Surgeon: Prasanth Jensen MD;  Location:  OR     IRRIGATION AND DEBRIDEMENT KNEE, PLACE ANTIBIOTIC CEMENT BEADS / SPACE Right 2/12/2020    Procedure: 1. Irrigation and debridement of wound breakdown, right total knee arthroplasty.  2. Irrigation and debridement of right total knee with placement of antibiotic-impregnated (vancomycin) absorbable antibiotic beads.;  Surgeon: Prasanth Jensen MD;  Location: [facility] OR     IRRIGATION AND DEBRIDEMENT LOWER EXTREMITY, COMBINED Right 12/8/2019    Procedure: DEBRIDEMENT OF RIGHT CALF AND WOUND CLOSURE.;  Surgeon: Sara Mratinez MD;  Location:  OR     KNEE SURGERY  2001 Right knee arthroscopy     OPTICAL TRACKING SYSTEM FUSION SPINE POSTERIOR LUMBAR THREE+ LEVELS N/A 10/29/2015    Procedure: OPTICAL TRACKING SYSTEM FUSION SPINE POSTERIOR LUMBAR THREE+ LEVELS;  Surgeon: Walt Garcia MD;  Location:  OR     PROSTATE SURGERY      radioactive seeding 6/16/08     REPAIR ANEURYSM ABDOMINAL AORTA  06/08     REPAIR VALVE MITRAL  10/16/2013    SJM 21(AGFN [id number]):TRISH, OG  27  501:MVRProcedure: REPAIR VALVE MITRAL;  REDO STERNOTOMY/REDO AORTIC VALVE REPLACEMENT/ MITRAL VALVE REPLACEMENT/REIMPLANTATION OF RIGHT CORONARY ARTERY BYPASS WITH RACHAEL ( ON PUMP);  Surgeon: Viet Singh MD;  Location: [facility] OR     REPLACE VALVE AORTIC  10/16/2013    Procedure: REPLACE VALVE AORTIC;;  Surgeon: Viet Singh MD;  Location: [facility] OR     SURGERY GENERAL IP CONSULT  05/2008 Excision aneurysm abdominal aorta     SURGERY GENERAL IP CONSULT  1997 Vocal cord polypectomy     VASCULAR SURGERY  1968, 1993     varicose vein stripping     [facility] CABG, VEIN, TWO  1/3/06    Left radial to RCA, LIMA to LAD (RA to RCA reimplanted at time of 2013 surg)     Medications   Home Meds  Prior to Admission medications    Medication Sig Start Date End Date Taking? Authorizing Provider   acetaminophen (TYLENOL) 500 MG tablet Take 500-1,000 mg by mouth every 6 hours as needed for pain   Yes Unknown, Entered By History   amoxicillin-clavulanate (AUGMENTIN) 875-125 MG tablet Take 1 tablet by mouth every morning 7/6/22  Yes Unknown, Entered By History   betamethasone valerate (VALISONE) 0.1 % external lotion Apply topically 2 times daily Apply topically to scalp twice daily PRN 4/9/21  Yes Jodi Flower Mai, MD   cholecalciferol 25 MCG (1000 UT) TABS Take 1,000 Units by mouth daily   Yes Unknown, Entered By History   ezetimibe (ZETIA) 10 MG tablet Take 1 tablet (10 mg) by mouth daily 8/1/22  Yes Jodi Flower Mai, MD   ferrous sulfate (FEROSUL) 325 (65 Fe) MG tablet Take 325 mg by mouth daily (with breakfast)   Yes Unknown, Entered By History   fluocinonide (LIDEX) 0.05 % external ointment Apply topically 2 times daily  Patient taking differently: Apply topically 2 times daily as needed 11/11/21  Yes Jodi Flower Mai, MD   furosemide (LASIX) 40 MG tablet Take 0.5 tablets (20 mg) by mouth daily 8/1/22  Yes Jodi Flower Mai, MD   glucosamine-chondroitin 500-400 MG CAPS per capsule Take 1 capsule by mouth  every evening   Yes Unknown, Entered By History   mesalamine (ASACOL HD) 800 MG EC tablet Take 1 tablet (800 mg) by mouth 2 times daily 8/1/22  Yes Jodi Flower Mai, MD   rosuvastatin (CRESTOR) 40 MG tablet Take 1 tablet (40 mg) by mouth daily 8/1/22  Yes Jodi Flower Mai, MD   tamsulosin (FLOMAX) 0.4 MG capsule Take 1 capsule (0.4 mg) by mouth daily 8/1/22  Yes Jodi Flower Mai, MD   triamcinolone (KENALOG) 0.1 % external cream Apply topically 2 times daily  Patient taking differently: Apply topically 2 times daily as needed 4/29/21  Yes Jodi Flower Mai, MD   triamcinolone (KENALOG) 0.1 % external lotion Apply topically 2 times daily  Patient taking differently: Apply topically 2 times daily as needed 5/3/21  Yes Jodi Flower Mai, MD   warfarin ANTICOAGULANT (COUMADIN) 5 MG tablet Take 5mg every Sun & Wed / Take 7.5mg all other days OR per INR clinic 6/14/22  Yes Jodi Flower Mai, MD   ACE/ARB/ARNI NOT PRESCRIBED (INTENTIONAL) Please choose reason not prescribed from choices below. 8/1/22   Jodi Flower Mai, MD   BETA BLOCKER NOT PRESCRIBED (INTENTIONAL) Please choose reason not prescribed from choices below. 8/1/22   Jodi Flower Mai, MD       Scheduled Meds    amoxicillin-clavulanate  1 tablet Oral QAM     ezetimibe  10 mg Oral Daily     furosemide  20 mg Oral Daily     mesalamine  800 mg Oral BID     rosuvastatin  40 mg Oral Daily     sodium chloride (PF)  3 mL Intracatheter Q8H     sodium chloride (PF)  3 mL Intracatheter Q8H     tamsulosin  0.4 mg Oral Daily     Warfarin Therapy Reminder  1 each Oral See Admin Instructions       Infusion Meds    NaCl       fentanyl drip       midazolam       - MEDICATION INSTRUCTIONS -         PRN Meds  acetaminophen, bupivacaine HCl (PF), ceFAZolin, fentanyl drip, fentaNYL (PF), lidocaine 4%, lidocaine 4%, lidocaine (buffered or not buffered), lidocaine (buffered or not buffered), lidocaine, melatonin, midazolam, midazolam, ondansetron **OR** ondansetron, -  MEDICATION INSTRUCTIONS -, sodium chloride (PF), sodium chloride (PF), sodium chloride (PF)    Allergies   Allergies   Allergen Reactions     Bees Anaphylaxis     Family History   Family History   Problem Relation Age of Onset     No Known Problems Mother      Coronary Artery Disease Father         CABG     Heart Disease Father         Pacemaker     No Known Problems Brother      No Known Problems Sister      No Known Problems Son      Other Cancer Daughter      No Known Problems Daughter      Heart Disease Brother      Other - See Comments Grandchild      Social History   Social History     Tobacco Use     Smoking status: Former Smoker     Packs/day: 1.00     Years: 40.00     Pack years: 40.00     Start date: 4/15/1962     Quit date: 10/23/2002     Years since quittin.7     Smokeless tobacco: Never Used   Substance Use Topics     Alcohol use: Yes     Comment: a couple beers per week (socially)     Drug use: No       Review of Systems   The 10 point Review of Systems is negative other than noted in the HPI or here.        PHYSICAL EXAMINATION   Temp:  [97.7  F (36.5  C)-98  F (36.7  C)] 97.7  F (36.5  C)  Pulse:  [36-41] 36  Resp:  [11-25] 13  BP: (111-158)/(49-70) 138/49  SpO2:  [94 %-99 %] 94 %    General Exam  General:  patient lying in bed without any acute distress    HEENT:  normocephalic/atraumatic  Pulmonary:  no respiratory distress    Neuro Exam  Mental Status:  alert, oriented x 3, follows commands, speech clear and fluent, naming and repetition normal  Cranial Nerves:  visual fields intact, PERRL, EOMI with normal smooth pursuit, facial sensation intact and symmetric, facial movements symmetric, hearing not formally tested but intact to conversation, palate elevation symmetric and uvula midline, no dysarthria, tongue protrusion midline  Motor:  normal muscle tone and bulk, no abnormal movements, able to move all limbs spontaneously, normal strength in RUE and BLE; limited LUE testing d/to pacemaker  placement but no abelino weakness   Sensory:  light touch sensation intact and symmetric throughout upper and lower extremities, no extinction on double simultaneous stimulation   Coordination: normal FNF on right, left deferred d/to recent pacemaker placement   Station/Gait:  deferred    Dysphagia Screen  Per Nursing    Stroke Scales    NIHSS  1a. Level of Consciousness 0-->Alert, keenly responsive   1b. LOC Questions 0-->Answers both questions correctly   1c. LOC Commands 0-->Performs both tasks correctly   2.   Best Gaze 0-->Normal   3.   Visual 0-->No visual loss   4.   Facial Palsy 0-->Normal symmetrical movements   5a. Motor Arm, Left (UN) Amputation or joint fusion   5b. Motor Arm, Right 0-->No drift, limb holds 90 (or 45) degrees for full 10 secs   6a. Motor Leg, Left 0-->No drift, leg holds 30 degree position for full 5 secs   6b. Motor Leg, right 0-->No drift, leg holds 30 degree position for full 5 secs   7.   Limb Ataxia 0-->Absent   8.   Sensory 0-->Normal, no sensory loss   9.   Best Language 0-->No aphasia, normal   10. Dysarthria 0-->Normal   11. Extinction and Inattention  0-->No abnormality   Total 0 (08/02/22 1340)       Imaging  I personally reviewed all imaging; relevant findings per HPI.    Labs Data   CBC  Recent Labs   Lab 08/02/22  1043 08/02/22  0551 08/01/22  1415   WBC 6.3 7.6 7.2   RBC 4.49 4.22* 4.59   HGB 13.0* 12.3* 13.4   HCT 40.3 37.5* 41.0    260 255     Basic Metabolic Panel   Recent Labs   Lab 08/02/22  0551 08/02/22  0130 08/01/22  1521 07/27/22  0822     --  137 137   POTASSIUM 3.9  --  4.3 4.6   CHLORIDE 108  --  107 106   CO2 25  --  28 24   BUN 14  --  17 21   CR 0.88  --  0.93 1.02   GLC 96 89 101* 122*   AWILDA 8.8  --  9.0 9.6     Liver Panel  Recent Labs   Lab 07/27/22  0822   PROTTOTAL 7.5   ALBUMIN 4.0   BILITOTAL 0.4   ALKPHOS 56   AST 27   ALT 28     INR    Recent Labs   Lab Test 08/02/22  1043 08/02/22  0551 08/01/22  1415   INR 1.78* 1.94* 2.34*      Lipid  Profile    Recent Labs   Lab Test 07/27/22  0822 07/26/21  1025 06/01/20  0941 03/21/16  1425 06/03/15  0933 07/16/14  0838   CHOL 123 137 152   < > 162 155   HDL 34* 38* 34*   < > 45 37*   LDL 64 73 90   < > 100 77   TRIG 124 131 138   < > 87 206*   CHOLHDLRATIO  --   --   --   --  3.6 4.2    < > = values in this interval not displayed.     A1C    Recent Labs   Lab Test 07/27/22  0822 01/06/22  0916 04/25/17  1244   A1C 6.5* 6.5* 5.9     Troponin I  No results for input(s): TROPONINIS, TROPONINI, GHTROP in the last 168 hours.       Stroke Consult Data Data   This was a non-emergent, non-telestroke consult.  Billing: I have personally spent a total of 75 minutes providing care today, time spent in reviewing medical records and reviewing tests, examining the patient and obtaining history, coordination of care, and discussion with the patient and/or family regarding diagnostic results, prognosis, symptom management, risks and benefits of management options, and development of plan of care. Greater than 50% was spent in counseling and coordination of care.

## 2022-08-02 NOTE — PROVIDER NOTIFICATION
MD Notification    Notified Person: MD    Notified Person Name: Julianna     Notification Date/Time: 8/2/2022 0056    Notification Interaction: paged    Purpose of Notification:    723 G.[address].    Pt.s HR 37-40. Can we get an EKG?    Thanks,  Kathy ANDERSON    Orders Received:    EKG ordered.    Comments:    Repaged 0200:     723 G.G.    Pt. EKG: SR with AV dissociation and wide QRS rhythm, L axis deviation, R BBB, inferior infarct, age undetermined. Rate: 43. Should pt. be on the cardiac unit?    See MD noted, pt. Moved to cardiac unit

## 2022-08-02 NOTE — PROVIDER NOTIFICATION
"Hospitalist Cross Cover  8/2/2022    Notified of bradycardia. Chart reviewed. Patient admitted for bradycardia and heart block. Repeat EKG here with sinus rhythm, RBBB (not new). PMH of multiple cardiac problems including atrial fibrillation, CAD s/p CABG, stenting, hypertension, RBBB, AV conduction disease, h/o ablation in 2019 for atrial flutter/fibrillation, mechanical AVR and MVR.     /59 (BP Location: Right arm)   Pulse (!) 37   Temp 97.7  F (36.5  C) (Oral)   Resp 16   Ht 1.664 m (5' 5.5\")   SpO2 97%   BMI 35.56 kg/m      Plan: Awaiting cardiology consultation later today. Recommend moving from neurology unit to cardiology unit.     Nalini Levine MD        "

## 2022-08-02 NOTE — PLAN OF CARE
A&O x4. Pt denied pain. VSS, on RA. Up w/ SBA. Tele: AV Paced with occasional PVCs. Alarms on. Neuro checks q4. Plan for echocardiogram this afternoon and chest x-ray and interrogation tomorrow.

## 2022-08-02 NOTE — PRE-PROCEDURE
GENERAL PRE-PROCEDURE:   Procedure:  PM  Date/Time:  8/2/2022 11:29 AM    Written consent obtained?: Yes    Risks and benefits: Risks, benefits and alternatives were discussed    Consent given by:  Patient  Patient states understanding of procedure being performed: Yes    Patient's understanding of procedure matches consent: Yes    Procedure consent matches procedure scheduled: Yes    Expected level of sedation:  Moderate  Appropriately NPO:  Yes  ASA Class:  4  Mallampati  :  Grade 2- soft palate, base of uvula, tonsillar pillars, and portion of posterior pharyngeal wall visible  Lungs:  Lungs clear with good breath sounds bilaterally  Heart:  Normal heart sounds and rate  History & Physical reviewed:  History and physical reviewed and no updates needed  Statement of review:  I have reviewed the lab findings, diagnostic data, medications, and the plan for sedation

## 2022-08-02 NOTE — PROGRESS NOTES
Dictated.    The patient was on IV heparin which was stopped just before the procedure.    Successful dual-chamber pacemaker implantation ([place] Mastodon C DDD 60/130 ppm).    EBL 15 mL.  No apparent complication.      Plan:  -Chest x-ray and device interrogation in the a.m.   -Please avoid IV heparin for 48 hours of the procedure (this will significantly increase the risk of pocket hematoma).  -Warfarin ASAP after he returns to CICU.  Do not wait to give it in the evening, as the patient already missed one dose yesterday.  -Therapeutic INR is generally safe for pacemaker implantation, but IV heparin post implant should be avoided.

## 2022-08-02 NOTE — PLAN OF CARE
"Goal Outcome Evaluation:  Denies CP, SOB. Pressure dressing CDI. Pt reported some \"slight blurriness\" to author that he \"doesn't have his glasses here\", and that the vision \"has improved since admission\". Author contacted MD, see note, continue to monitor per provider. All pt needs met  at this time. Pt refused chair/bed alarms and staff assistance when ambulating. Author gave verbal ed about safety post procedure and post pacemaker about preventing falls. Pt verbalized understanding and still refused. Pt declined CPAP.    "

## 2022-08-02 NOTE — PROGRESS NOTES
[facility]    Medicine Progress Note - Hospitalist Service    Date of Admission:  8/1/2022    Assessment & Plan        Fausto Farr is a 82 year old male with past medical history significant for CAD s/p CABG, AAA s/p repair, mechanical AVR and MVR, paroxysmal atrial fibrillation/flutter, GAGE, and many other issues admitted on 8/1/2022 with intermittent light-headedness and visual changes.     Complete heart block  Underlying AV conduction disease  Hx of paroxysmal atrial fibrillation  History of typical atrial flutter, successful catheter ablation in 04/2019  The patient presented initially to clinic with complaints of intermittent light-headedness and blurry vision. In clinic he was found to be bradycardic with 3rd degree heart block on EKG.   He did end up having a stroke work-up in the ED due to complaints of visual changes. He does have extensive atherosclerotic plaquing but no acute stroke was identified. MRI is still pending. EKG in the ED showed sinus rhythm with AV dissociation and idioventricular rhythm. Suspect his symptoms are primarily related to his cardiac dysrhythmia.   - Admitted to inpatient.  - Cardiology consulted, planning permanent pacemaker placement later today.  - Cardiac monitoring.  - NPO for now until after PPM placement.     Valvular heart disease - AVR in 2006 with subsequent perivalvular leak and redo mechanical AVR and concomitant mechanical MVR in 2013  Coronary artery disease s/p CABG (LIMA to LAD and radial graft to RCA) in 2006.  Chronic diastolic heart failure.  LVEF is 55%-60%.   Previous endovascular AAA repair  Peripheral vascular disease, carotid artery atherosclerosis   Hypertension  - Cardiology consulted as noted above.  - Hold PTA Warfarin. INR on admission was 2.34, down to 1.94 this morning. Goal INR when warfarin is restarted is 2.5-3.5. No need for INR reversal per cardiology.  - Continue heparin bridge in anticipation of pacemaker  placement.  - Continue PTA Lasix 20mg daily.  - Continue PTA Ezetimibe, rosuvastatin.  - Not prescribed BB or ACEi PTA.    Right eye vision changes  Symptoms come and go, currently resolved. Neurology was contacted in the ER. CT head showed no acute changes. CTA head/neck showed stenosis of left vertebral artery, right ICA filling defect, extensive atherosclerotic plaquing involving the carotid siphons.  - MRI brain w/wo ordered for further work-up, he just went down for this, follow-up on results.  - Stroke team following.  - May need vascular surgery involvement.    Obstructive sleep apnea  - Continue CPAP at night per home settings.     Ulcerative Colitis   - Continue PTA mesalamine.     Type II DM   Hemoglobin A1C was 6.5% recently. Not on any medications currently.   - Monitor blood sugars, can add sliding scale insulin if needed.    Status post total right knee replacement  With multiple complications and non-healing wounds.    - Now on oral augmentin for prevention indefinitely - continue.       Diet: NPO per Anesthesia Guidelines for Procedure/Surgery Except for: Meds  NPO for Medical/Clinical Reasons Except for: Other; Specify: May take medications with a drink of water prior to Electrophysiology study    DVT Prophylaxis: Warfarin  Lord Catheter: Not present  Central Lines: None  Cardiac Monitoring: ACTIVE order. Indication: Bradycardias (48 hours)  Code Status: Full Code      Disposition Plan      Expected Discharge Date: 08/03/2022                The patient's care was discussed with the Bedside Nurse and Patient.    Misael Gutierrez MD  Hospitalist Service  [facility]  Securely message with the Vocera Web Console (learn more here)  Text page via [employer] Paging/Directory         Clinically Significant Risk Factors Present on Admission               # Coagulation Defect: home medication list includes an anticoagulant medication   # Hypertension: home medication list includes  "antihypertensive(s)    # Obesity: Estimated body mass index is 35.56 kg/m  as calculated from the following:    Height as of this encounter: 1.664 m (5' 5.5\").    Weight as of an earlier encounter on 8/1/22: 98.4 kg (217 lb).        ______________________________________________________________________    Interval History   Fausto Farr was seen this morning. He feels OK. Mild headache, mainly located behind his left eye, states he had some discomfort behind his right eye a few days ago but now it has moved over to the other side. Denies numbness, tingling, focal weakness. No fevers, lightheadedness, chest pain, shortness of breath, nausea, abdominal pain.    Data reviewed today: I reviewed all medications, new labs and imaging results over the last 24 hours. I personally reviewed no images or EKG's today.    Physical Exam   Vital Signs: Temp: 97.7  F (36.5  C) Temp src: Oral BP: 120/51 Pulse: (!) 38   Resp: 18 SpO2: 97 % O2 Device: None (Room air)    Weight: 0 lbs 0 oz  Constitutional: awake, alert, cooperative, no apparent distress, laying in the hospital bed  Respiratory: clear to auscultation bilaterally, no crackles or wheezing  Cardiovascular: regular rhythm, bradycardic, clicks from mechanical valves, no murmur noted  GI: normal bowel sounds, soft, non-distended, non-tender  Skin: warm, dry  Musculoskeletal: no lower extremity pitting edema present  Neurologic: awake, alert, answers questions appropriately, moves all extremities    Data   Recent Labs   Lab 08/02/22  0551 08/02/22  0130 08/01/22  1521 08/01/22  1415 07/27/22  0822   WBC 7.6  --   --  7.2 8.4   HGB 12.3*  --   --  13.4 13.9   MCV 89  --   --  89 90     --   --  255 290   INR 1.94*  --   --  2.34* 2.5*     --  137  --  137   POTASSIUM 3.9  --  4.3  --  4.6   CHLORIDE 108  --  107  --  106   CO2 25  --  28  --  24   BUN 14  --  17  --  21   CR 0.88  --  0.93  --  1.02   ANIONGAP 7  --  2*  --  7   AWILDA 8.8  --  9.0  --  9.6   GLC " 96 89 101*  --  122*   ALBUMIN  --   --   --   --  4.0   PROTTOTAL  --   --   --   --  7.5   BILITOTAL  --   --   --   --  0.4   ALKPHOS  --   --   --   --  56   ALT  --   --   --   --  28   AST  --   --   --   --  27     Medications     NaCl       heparin 1,200 Units/hr (08/02/22 4282)     - MEDICATION INSTRUCTIONS -         amoxicillin-clavulanate  1 tablet Oral QAM     ceFAZolin  2 g Intravenous Pre-Op/Pre-procedure x 1 dose     ezetimibe  10 mg Oral Daily     furosemide  20 mg Oral Daily     mesalamine  800 mg Oral BID     rosuvastatin  40 mg Oral Daily     sodium chloride (PF)  3 mL Intracatheter Q8H     sodium chloride (PF)  3 mL Intracatheter Q8H     tamsulosin  0.4 mg Oral Daily

## 2022-08-02 NOTE — PHARMACY-ANTICOAGULATION SERVICE
Clinical Pharmacy - Warfarin Dosing Consult     Pharmacy has been consulted to manage this patient s warfarin therapy.  Indication: Mechanical Aortic Valve Replacement;Mechanical Mitral Valve Replacement  Therapy Goal: INR 2.5-3.5  Warfarin Prior to Admission: Yes  Warfarin PTA Regimen: 5 mg Roberson, We and 7.5 mg AOD  Significant drug interactions: PTA Augmentin  Recent documented change in oral intake/nutrition: No  Dose Comments: First dose to be given immediately post-op on 8/2 per Dr. Vasquez request.    INR   Date Value Ref Range Status   08/02/2022 1.78 (H) 0.85 - 1.15 Final   08/02/2022 1.94 (H) 0.85 - 1.15 Final       Recommend warfarin 7.5 mg today.  Pharmacy will monitor Fausto Farr daily and order warfarin doses to achieve specified goal.      Please contact pharmacy as soon as possible if the warfarin needs to be held for a procedure or if the warfarin goals change.

## 2022-08-02 NOTE — CONSULTS
Patient has Medicare D through [employer].    Xarelto/Eliquis  --Upon receipt of RX, Discharge Pharmacy can provide 1 mo free.  --Subsequent fills will be $42/mo.  --When total drug costs exceed $4,430, price will increase to a 25% coinsurance, equivalent to $145/mo.    Flytoven (warfarin): $8/mo ($0/mo at [facility])    -NAZARIO Samayoa, Pharmacy Technician/Liaison, Discharge Pharmacy [phone]

## 2022-08-02 NOTE — CONSULTS
Consult Date: 08/02/2022    REFERRING PHYSICIAN:  Caroline Roberts M.D.    REASON FOR CONSULTATION:  Complete AV block.    HISTORY OF PRESENT ILLNESS:  Mr. Farr is an 82-year-old white male with multiple medical problems.  He presented to the ER with visual disturbance and was found to have complete AV block.  He stated that the visual disturbance has basically resolved after the admission.  Head CT scan did not show evidence of stroke.  He did not have syncope or near syncope.  There has been some recent dizziness, but no chest pain or shortness of breath.    The patient is not on any bradycardia medications and a complete AV block has been persistent since the admission.    PAST MEDICAL HISTORY:  Coronary artery disease with previous CABG.  Current ejection fraction is normal.  Previous atrial flutter ablation with subsequent paroxysmal atrial fibrillation.  He is mostly in sinus rhythm and he has not had any other admission for atrial fibrillation.    Status post mechanical mitral and aortic valve replacement.    Other problems include hypertension, hiatal hernia, gastric ulcerative colitis, obstructive sleep apnea, prostate cancer, peripheral vascular disease, and abdominal aortic aneurysm repair.    FAMILY HISTORY:  Noncontributory to AV block.    SOCIAL HISTORY:  He is full code.  Does not smoke or abuse alcohol.    REVIEW OF SYSTEMS:  Comprehensive review of the systems showed no fever, cough or diarrhea.  He had previous MRSA infection.    MEDICATIONS:  Prior to this admission include:  1.  Tylenol.  2.  Zetia.  3.  Ferrous sulfate.  4.  Lasix.  5.  Glucosamine.  6.  Crestor.  7.  Flomax.  8.  Warfarin.    ALLERGIES:  BEE STINGS.    PHYSICAL EXAMINATION:    GENERAL:  Alert and oriented with no acute distress.  VITAL SIGNS:  Blood pressure 120/51, heart rate 38 beats per minute, temperature 97.7 degrees, oxygen saturation 97% on room air.  HEENT:  The eyes and ENT were unremarkable.  NECK:  The jugular vein  was not elevated.  LUNGS:  Clear.  HEART:  Rhythm was regular.  Heart sounds were normal without murmur.  ABDOMEN:  No hepatomegaly.  EXTREMITIES:  There is no pedal edema.  NEUROLOGIC:  Showed no focal deficit.    ASSESSMENT AND RECOMMENDATIONS:  Mr. Vaughn is an 82-year-old white male who was admitted for visual disturbance with dizziness.  He has complete AV block with no reversible etiology.  I have recommended pacemaker implantation.  The risks and benefits of pacemaker implantation were explained to the patient who expressed understanding and consented for the procedure.    The patient may need further evaluation and management for extensive peripheral vascular disease.    Prema Ontiveros MD        D: 2022   T: 2022   MT: ESTRADA    Name:     LIANG VAUGHN  MRN:      [id number]        Account:      [id number]   :      1940           Consult Date: 2022     Document: [id number]

## 2022-08-02 NOTE — PLAN OF CARE
Pt. Arrived from ED around 0045. A&Ox4, VSS ex. Bradycardia, EKG completed, MD notified. Neuro intact ex generalized weakness. Denies feeling lightheaded. Denies any blurry vision. Hep gtt infusing at 1,200 units/hr. Pt. Transferred to cardiac unit with belongings at 0310

## 2022-08-03 ENCOUNTER — APPOINTMENT (OUTPATIENT)
Dept: CARDIOLOGY | Facility: CLINIC | Age: 82
DRG: 242 | End: 2022-08-03
Attending: NURSE PRACTITIONER
Payer: MEDICARE

## 2022-08-03 ENCOUNTER — APPOINTMENT (OUTPATIENT)
Dept: GENERAL RADIOLOGY | Facility: CLINIC | Age: 82
DRG: 242 | End: 2022-08-03
Attending: INTERNAL MEDICINE
Payer: MEDICARE

## 2022-08-03 VITALS
OXYGEN SATURATION: 98 % | DIASTOLIC BLOOD PRESSURE: 86 MMHG | TEMPERATURE: 97.6 F | BODY MASS INDEX: 34.78 KG/M2 | SYSTOLIC BLOOD PRESSURE: 141 MMHG | WEIGHT: 216.4 LBS | HEIGHT: 66 IN | RESPIRATION RATE: 16 BRPM | HEART RATE: 75 BPM

## 2022-08-03 DIAGNOSIS — I44.2 COMPLETE ATRIOVENTRICULAR BLOCK (H): Primary | ICD-10-CM

## 2022-08-03 DIAGNOSIS — Z95.0 CARDIAC PACEMAKER IN SITU: ICD-10-CM

## 2022-08-03 LAB
ANION GAP SERPL CALCULATED.3IONS-SCNC: 6 MMOL/L (ref 3–14)
BUN SERPL-MCNC: 15 MG/DL (ref 7–30)
CALCIUM SERPL-MCNC: 8.8 MG/DL (ref 8.5–10.1)
CHLORIDE BLD-SCNC: 107 MMOL/L (ref 94–109)
CO2 SERPL-SCNC: 27 MMOL/L (ref 20–32)
CREAT SERPL-MCNC: 0.91 MG/DL (ref 0.66–1.25)
ERYTHROCYTE [DISTWIDTH] IN BLOOD BY AUTOMATED COUNT: 12.4 % (ref 10–15)
GFR SERPL CREATININE-BSD FRML MDRD: 85 ML/MIN/1.73M2
GLUCOSE BLD-MCNC: 108 MG/DL (ref 70–99)
GLUCOSE BLDC GLUCOMTR-MCNC: 105 MG/DL (ref 70–99)
HCT VFR BLD AUTO: 40.3 % (ref 40–53)
HGB BLD-MCNC: 13 G/DL (ref 13.3–17.7)
INR PPP: 1.74 (ref 0.85–1.15)
LVEF ECHO: NORMAL
MAGNESIUM SERPL-MCNC: 2.1 MG/DL (ref 1.6–2.3)
MCH RBC QN AUTO: 29.1 PG (ref 26.5–33)
MCHC RBC AUTO-ENTMCNC: 32.3 G/DL (ref 31.5–36.5)
MCV RBC AUTO: 90 FL (ref 78–100)
PLATELET # BLD AUTO: 256 10E3/UL (ref 150–450)
POTASSIUM BLD-SCNC: 4 MMOL/L (ref 3.4–5.3)
RBC # BLD AUTO: 4.46 10E6/UL (ref 4.4–5.9)
SODIUM SERPL-SCNC: 140 MMOL/L (ref 133–144)
WBC # BLD AUTO: 7.2 10E3/UL (ref 4–11)

## 2022-08-03 PROCEDURE — 93306 TTE W/DOPPLER COMPLETE: CPT | Mod: 26 | Performed by: INTERNAL MEDICINE

## 2022-08-03 PROCEDURE — 250N000013 HC RX MED GY IP 250 OP 250 PS 637: Performed by: STUDENT IN AN ORGANIZED HEALTH CARE EDUCATION/TRAINING PROGRAM

## 2022-08-03 PROCEDURE — 255N000002 HC RX 255 OP 636: Performed by: STUDENT IN AN ORGANIZED HEALTH CARE EDUCATION/TRAINING PROGRAM

## 2022-08-03 PROCEDURE — 36415 COLL VENOUS BLD VENIPUNCTURE: CPT | Performed by: HOSPITALIST

## 2022-08-03 PROCEDURE — 93010 ELECTROCARDIOGRAM REPORT: CPT | Performed by: INTERNAL MEDICINE

## 2022-08-03 PROCEDURE — 99232 SBSQ HOSP IP/OBS MODERATE 35: CPT | Mod: 25 | Performed by: INTERNAL MEDICINE

## 2022-08-03 PROCEDURE — 999N000065 XR CHEST 2 VIEWS

## 2022-08-03 PROCEDURE — 83735 ASSAY OF MAGNESIUM: CPT | Performed by: HOSPITALIST

## 2022-08-03 PROCEDURE — 99239 HOSP IP/OBS DSCHRG MGMT >30: CPT | Performed by: HOSPITALIST

## 2022-08-03 PROCEDURE — 93005 ELECTROCARDIOGRAM TRACING: CPT

## 2022-08-03 PROCEDURE — 250N000013 HC RX MED GY IP 250 OP 250 PS 637: Performed by: INTERNAL MEDICINE

## 2022-08-03 PROCEDURE — 999N000208 ECHOCARDIOGRAM COMPLETE

## 2022-08-03 PROCEDURE — 85610 PROTHROMBIN TIME: CPT | Performed by: INTERNAL MEDICINE

## 2022-08-03 PROCEDURE — 85027 COMPLETE CBC AUTOMATED: CPT | Performed by: HOSPITALIST

## 2022-08-03 PROCEDURE — 80048 BASIC METABOLIC PNL TOTAL CA: CPT | Performed by: HOSPITALIST

## 2022-08-03 RX ORDER — WARFARIN SODIUM 5 MG/1
10 TABLET ORAL
Status: DISCONTINUED | OUTPATIENT
Start: 2022-08-03 | End: 2022-08-03 | Stop reason: HOSPADM

## 2022-08-03 RX ADMIN — HUMAN ALBUMIN MICROSPHERES AND PERFLUTREN 9 ML: 10; .22 INJECTION, SOLUTION INTRAVENOUS at 09:19

## 2022-08-03 RX ADMIN — ACETAMINOPHEN 650 MG: 325 TABLET ORAL at 13:11

## 2022-08-03 RX ADMIN — MESALAMINE 800 MG: 800 TABLET, DELAYED RELEASE ORAL at 08:54

## 2022-08-03 RX ADMIN — EZETIMIBE 10 MG: 10 TABLET ORAL at 08:54

## 2022-08-03 RX ADMIN — AMOXICILLIN AND CLAVULANATE POTASSIUM 1 TABLET: 875; 125 TABLET, FILM COATED ORAL at 08:54

## 2022-08-03 RX ADMIN — ROSUVASTATIN CALCIUM 40 MG: 20 TABLET, FILM COATED ORAL at 08:54

## 2022-08-03 RX ADMIN — FUROSEMIDE 20 MG: 20 TABLET ORAL at 08:54

## 2022-08-03 RX ADMIN — TAMSULOSIN HYDROCHLORIDE 0.4 MG: 0.4 CAPSULE ORAL at 08:55

## 2022-08-03 RX ADMIN — ACETAMINOPHEN 1000 MG: 500 TABLET, FILM COATED ORAL at 01:02

## 2022-08-03 ASSESSMENT — ACTIVITIES OF DAILY LIVING (ADL)
WALKING_OR_CLIMBING_STAIRS_DIFFICULTY: NO
ADLS_ACUITY_SCORE: 22
DIFFICULTY_EATING/SWALLOWING: NO
ADLS_ACUITY_SCORE: 22
DOING_ERRANDS_INDEPENDENTLY_DIFFICULTY: NO
ADLS_ACUITY_SCORE: 22
CONCENTRATING,_REMEMBERING_OR_MAKING_DECISIONS_DIFFICULTY: NO
FALL_HISTORY_WITHIN_LAST_SIX_MONTHS: NO
WEAR_GLASSES_OR_BLIND: YES
ADLS_ACUITY_SCORE: 22
TOILETING_ISSUES: NO
DRESSING/BATHING_DIFFICULTY: NO
ADLS_ACUITY_SCORE: 22
CHANGE_IN_FUNCTIONAL_STATUS_SINCE_ONSET_OF_CURRENT_ILLNESS/INJURY: YES
ADLS_ACUITY_SCORE: 22
EQUIPMENT_CURRENTLY_USED_AT_HOME: CANE, STRAIGHT
VISION_MANAGEMENT: GLASSES
ADLS_ACUITY_SCORE: 22

## 2022-08-03 NOTE — PLAN OF CARE
VSS, not in pain at time of discharge. Pt states all belongings and paperwork are accounted for. No new medications. Discharge education complete including meds, follow up and all instructions. Post PPM instructions gone over. Stroke symptoms gone over. No further questions on discharge information. Family here to drive pt home.

## 2022-08-03 NOTE — PLAN OF CARE
Care plan note:      Recent Vitals:  Temp: 97.6  F (36.4  C) Temp src: Oral BP: 123/78 Pulse: 61   Resp: 16 SpO2: 96 % O2 Device: None (Room air)      Orientation/Neuro: Alert and Oriented x 4  Pain: Pain is controlled with current analgesics.  Medication(s) being used: acetaminophen   Tele: Paced   IV medications: None   Mobility: up Independently and patient is refusing staff assistance and bed alarm- education provided   Skin: Incision: PPM site L chest- WDL, dressing intact   GI: WDL and no complaints  : WDL     Diet: Tolerating diet:   Well  Orders Placed This Encounter      Regular Diet Adult      Safety/Concerns:  Fall Risk  Aggression Color: Green    Plan: Patient has been resting well tonight. VSS on RA. Patient denies chest pain and SOB. Patient has had some incisional pain at PPM site- tylenol was given and helped patient with the pain. Q4 neuros intact, BG stable. Patient has refused to have staff help them around the room and is refusing their bed alarm; patient is stable on feet- education was provided, the patient still refused. Plan to possibly discharge home today.    Continue to monitor.      Hollie Lam RN

## 2022-08-03 NOTE — PROGRESS NOTES
Reviewed with marily Mills seen and examined.  Post pacemaker implantation. No complications.    82 year-old white male, presented to ER for visual disturbance, found to have complete AV block.  Not on bradycardia medications.  History of CABG, normal EF.  Previous AFL ablation. Subsequent paroxysmal AF. On warfarin.  SP mechanical mitral and aortic valve replacement.  Hypertension, hiatal hernia, gastric ulcer, ulcerative colitis, GAGE, prostate cancer, PVD, AAA repair.    Agree for discharge today.  Sign off

## 2022-08-03 NOTE — PROVIDER NOTIFICATION
0246   2038  Pt reports some blurriness in R eye, improved since admission. Neuro provider note says no visual symptoms. Other neuros intact. VSS. Do you want to continue Q4 neuros? Thanks.   Amarilis [phone]    MD called back 2041, continue Q4 hour neuros.

## 2022-08-03 NOTE — DISCHARGE SUMMARY
[facility]  Hospitalist Discharge Summary      Date of Admission:  8/1/2022  Date of Discharge:  8/3/2022  Discharging Provider: Misael Gutierrez MD  Discharge Service: Hospitalist Service    Discharge Diagnoses   Complete heart block  Underlying AV conduction disease  Paroxysmal atrial fibrillation  History of typical atrial flutter, successful catheter ablation in 04/2019  Valvular heart disease - AVR in 2006 with subsequent perivalvular leak and redo mechanical AVR and concomitant mechanical MVR in 2013  Coronary artery disease s/p CABG (LIMA to LAD and radial graft to RCA) in 2006.  Chronic diastolic heart failure.  LVEF is 55%-60%.   Previous endovascular AAA repair  Peripheral vascular disease, carotid artery atherosclerosis   Hypertension  Subacute left occipital infarct  Obstructive sleep apnea  Ulcerative Colitis   Type II DM   Status post total right knee replacement    Follow-ups Needed After Discharge   Follow-up Appointments     Follow-up and recommended labs and tests       Follow up with primary care provider, Chris Flower, within 7 days for   hospital follow- up.  INR on 8/4/22 with results to anticoagulation clinic.             Discharge Disposition   Discharged to home  Condition at discharge: Stable    Hospital Course   Fausto Farr is a 82 year old male with past medical history significant for CAD s/p CABG, AAA s/p repair, mechanical AVR and MVR, paroxysmal atrial fibrillation/flutter, GAGE, and many other issues admitted on 8/1/2022 with intermittent light-headedness and visual changes.     Complete heart block  Underlying AV conduction disease  Paroxysmal atrial fibrillation  History of typical atrial flutter, successful catheter ablation in 04/2019  The patient presented initially to clinic with complaints of intermittent light-headedness and blurry vision. In clinic he was found to be bradycardic with 3rd degree heart block on EKG.   He did end up having a  stroke work-up in the ED due to complaints of visual changes. He does have extensive atherosclerotic plaquing but no acute stroke was identified. MRI is still pending. EKG in the ED showed sinus rhythm with AV dissociation and idioventricular rhythm. Suspect his symptoms are primarily related to his cardiac dysrhythmia.   - Admitted to inpatient.  - Cardiology consulted.  - S/p MRI compatible permanent dual chamber pacemaker placement on 8/2/22.  - Tolerated procedure well.  - Has been cleared by cardiology for discharge. He feels better and wants to go home.  - Discharge home today.  - Follow-up with cardiology as directed.  - Discussed reasons to seek medical attention prior to follow-up.     Valvular heart disease - AVR in 2006 with subsequent perivalvular leak and redo mechanical AVR and concomitant mechanical MVR in 2013  Coronary artery disease s/p CABG (LIMA to LAD and radial graft to RCA) in 2006.  Chronic diastolic heart failure.  LVEF is 55%-60%.   Previous endovascular AAA repair  Peripheral vascular disease, carotid artery atherosclerosis   Hypertension  - Cardiology consulted as noted above.  - Held PTA Warfarin initially. INR on admission was 2.34, drifted down (was not reversed) to 1.74 on the day of discharge. Warfarin restarted on 8/2/22.  - Bridged with heparin prior to pacemaker placement. Cardiology recommending against bridging for at least 48 hours after pacemaker placement.  - Resume warfarin upon discharge. Will take a 10 mg dose this evening and then get INR checked. Further warfarin dosing on 8/4/22 and beyond per anticoagulation clinic.  - Continue PTA Lasix 20mg daily.  - Continue PTA Ezetimibe, rosuvastatin.  - Not prescribed BB or ACEi PTA.    Subacute left occipital infarct  Symptoms came and went, seemed to be resolved on the day of discharge. Neurology was contacted in the ER. CT head showed no acute changes. CTA head/neck showed stenosis of left vertebral artery, right ICA filling  defect, extensive atherosclerotic plaquing involving the carotid siphons. MRI brain showed subacute left occipital infarct.  - Neurology consulted, appreciate their assistance.  - Suspect stroke was cardioembolic related to atrial fibrillation with subtherapeutic INR.  - Recommended continued medical management with warfarin, crestor, and zetia. Warfarin management as noted above.  - Follow-up with [facility] in 6-8 weeks.    Obstructive sleep apnea  - Continue CPAP at night per home settings.     Ulcerative Colitis   - Continue PTA mesalamine.     Type II DM   Hemoglobin A1C was 6.5% recently. Not on any medications currently.   - Monitored blood sugars, did not require intervention.    Status post total right knee replacement  With multiple complications and non-healing wounds.    - Now on oral augmentin for prevention indefinitely - continue the same.    Consultations This Hospital Stay   PHARMACY IP CONSULT  PHARMACY IP CONSULT  CARDIOLOGY IP CONSULT  PHARMACY TO DOSE WARFARIN  PHARMACY LIAISON FOR MEDICATION COVERAGE CONSULT  NEUROLOGY IP STROKE CONSULT  CARE MANAGEMENT / SOCIAL WORK IP CONSULT    Code Status   Full Code    Time Spent on this Encounter   I, Misael Gutierrez MD, personally saw the patient today and spent greater than 30 minutes discharging this patient.       Misael Gutierrez MD  [facility]  [address] [place] [address], [address]  [facility] [address]  Phone: [phone]  ______________________________________________________________________    Physical Exam   Vital Signs: Temp: 97.6  F (36.4  C) Temp src: Oral BP: (!) 141/86 Pulse: 75   Resp: 16 SpO2: 98 % O2 Device: None (Room air) Oxygen Delivery: 2 LPM  Weight: 216 lbs 6.4 oz  Constitutional: awake, alert, cooperative, no apparent distress, laying in the hospital bed  Respiratory: clear to auscultation bilaterally, no crackles or wheezing  Cardiovascular: regular rhythm, bradycardic, clicks  from mechanical valves, no murmur noted  GI: normal bowel sounds, soft, non-distended, non-tender  Skin: warm, dry  Musculoskeletal: no lower extremity pitting edema present  Neurologic: awake, alert, answers questions appropriately, moves all extremities       Primary Care Physician   Chris Flower    Discharge Orders      INR     Reason for your hospital stay    Lightheadedness, vision changes, complete heart block, stroke     Activity    Your activity upon discharge: activity as tolerated with any post pacemaker placement restrictions as noted by cardiology.     Discharge Instructions    Take an extra 5 mg of warfarin on 8/3/22 for a total of 10 mg of warfarin on the evening of 8/3/22. Get your INR checked on 8/4/22. The anticoagulation clinic will guide further warfarin dosing based on your INR result on 8/4/22.     Follow-up and recommended labs and tests     Follow up with primary care provider, Chris Flower, within 7 days for hospital follow- up.  INR on 8/4/22 with results to anticoagulation clinic.     Diet    Follow this diet upon discharge: Regular Diet Adult     Stroke Hospital Follow Up    [facility] as prescribed by your provider. If you don t hear from a representative within 2 business days, please call [phone].       Significant Results and Procedures   Most Recent 3 CBC's:Recent Labs   Lab Test 08/03/22  0609 08/02/22  1043 08/02/22  0551   WBC 7.2 6.3 7.6   HGB 13.0* 13.0* 12.3*   MCV 90 90 89    251 260     Most Recent 3 BMP's:Recent Labs   Lab Test 08/03/22  0609 08/03/22  0214 08/02/22  2128 08/02/22  1614 08/02/22  0551 08/02/22  0130 08/01/22  1521     --   --   --  140  --  137   POTASSIUM 4.0  --   --   --  3.9  --  4.3   CHLORIDE 107  --   --   --  108  --  107   CO2 27  --   --   --  25  --  28   BUN 15  --   --   --  14  --  17   CR 0.91  --   --   --  0.88  --  0.93   ANIONGAP 6  --   --   --  7  --  2*   AWILDA 8.8  --   --    --  8.8  --  9.0   * 105* 131*   < > 96   < > 101*    < > = values in this interval not displayed.     Most Recent INR's and Anticoagulation Dosing History:  Anticoagulation Dose History     Recent Dosing and Labs Latest Ref Rng & Units 6/30/2022 7/27/2022 8/1/2022 8/2/2022 8/2/2022 8/2/2022 8/3/2022    Warfarin 7.5 mg - - - - - - 7.5 mg -    INR 0.85 - 1.15 3.7(H) 2.5(H) 2.34(H) 1.94(H) 1.78(H) - 1.74(H)        Results for orders placed or performed during the hospital encounter of 08/01/22   CT Head w/o Contrast    Narrative    CT SCAN OF THE HEAD WITHOUT CONTRAST   8/1/2022 3:40 PM     HISTORY: Headache, loss of vision which is on/off.    TECHNIQUE:  Axial images of the head and coronal reformations without  IV contrast material. Radiation dose for this scan was reduced using  automated exposure control, adjustment of the mA and/or kV according  to patient size, or iterative reconstruction technique.    COMPARISON: None.    FINDINGS:  Mild volume loss is present. White matter hypoattenuation likely  represents mild chronic small vessel ischemic change. The cerebral  hemispheres, brainstem, and cerebellum otherwise demonstrate normal  morphology and attenuation. No evidence of acute ischemia, hemorrhage,  mass, mass effect or hydrocephalus. The visualized calvarium, tympanic  cavities, mastoid cavities, and paranasal sinuses are unremarkable.      Impression    IMPRESSION:   No CT evidence of acute intracranial abnormality.    KAREN MENDENHALL MD         SYSTEM ID:  [id number]   CTA Head Neck with Contrast    Narrative    CT ANGIOGRAM OF THE HEAD AND NECK WITH CONTRAST  8/1/2022 3:44 PM     HISTORY: Headache, transient loss of vision.    TECHNIQUE:  CT angiography with an injection of 75mL [id number]-370 IV  with scans through the head and neck. Images were transferred to a  separate 3-D workstation where multiplanar reformations and 3-D images  were created. Estimates of carotid stenoses are made relative to  the  distal internal carotid artery diameters except as noted. Radiation  dose for this scan was reduced using automated exposure control,  adjustment of the mA and/or kV according to patient size, or iterative  reconstruction technique.    COMPARISON: None.     CT ANGIOGRAM HEAD FINDINGS:    The vertebral arteries, basilar artery, and posterior cerebral  arteries are patent. The internal carotid arteries, anterior cerebral  arteries, and middle cerebral arteries are patent. Extensive  atherosclerotic plaquing involving the bilateral carotid siphons with  an area of relative hypoattenuation within the right internal carotid  artery at the cavernous segment (series 6 image 414). No aneurysm or  vascular malformation is identified.    CT ANGIOGRAM NECK FINDINGS:   The bilateral common carotid, internal carotid, external carotid, and  vertebral arteries are patent. Scattered atherosclerotic plaquing.  Moderate stenosis at the left vertebral artery origin. No evidence of  flow-limiting stenosis at the carotid bifurcations. No evidence of  dissection.     INCIDENTAL FINDINGS: Severe cervical spine degenerative change with  multiple severe stenoses. Cardiac surgery change. Scattered  nonspecific pulmonary opacities. Asymmetric hyperattenuation and  multiple calcifications involving the right parotid gland.      Impression    IMPRESSION:   CTA Head:   1. Extensive atherosclerotic plaquing involving the carotid siphons.  2. Filling defect within the right internal carotid artery at the  cavernous segment which could represent exophytic plaque or  nonocclusive thrombus.   CTA Neck:   1. Moderate stenosis at the left vertebral artery origin.  2. Otherwise, no evidence of large vessel occlusion or high-grade  stenosis.   3. Incidental findings as detailed.    KAREN MENDENHALL MD         SYSTEM ID:  [id number]   MR Brain w/o & w Contrast    Narrative    MRI BRAIN WITHOUT AND WITH CONTRAST  8/2/2022 10:45 AM     HISTORY: Dizziness,  visual disturbance. Evaluate for acute infarct.  History of cardiac arrhythmia.    TECHNIQUE:  Multiplanar, multisequence MRI of the brain without and  with 10 mL Gadavist.     COMPARISON: CT head dated 8/1/2022.     FINDINGS: Punctate focus of restricted diffusion with corresponding T2  FLAIR hyperintense signal involving the paramedian left occipital lobe  (series 602 image 16) concerning for an acute/subacute ischemic  infarct. No associated acute hemorrhage. No significant mass effect.    The ventricles are normal in size and configuration. Mild to moderate  generalized brain parenchymal volume loss. Mild scattered patchy  nonspecific T2 FLAIR hyperintense signal abnormality in the cerebral  white matter, likely sequela of chronic small vessel ischemic disease.  A few scattered punctate foci of parenchymal susceptibility-related  signal loss noted at the cortical gray-white interfaces of the  bilateral cerebral hemispheres (for example, in the bilateral lateral  occipital regions, series 901 image 12; also in the right lateral  temporal and lateral frontal regions, series 901 images 10-12). These  are concerning for chronic microhemorrhages. There are nonspecific as  to etiology, but this pattern could potentially be seen in early  cerebral amyloid angiopathy. No intracranial mass lesion or mass  effect/herniation. No abnormal intracranial postcontrast enhancement  is seen. The major arterial flow voids of the skull base are grossly  maintained.    Bilateral lens implants. Trace mucosal thickening in the ethmoid  sinuses. Minimal fluid/membrane thickening in the right mastoid tip.  The calvarium, skull base and mid face otherwise appear grossly  unremarkable.      Impression    IMPRESSION:  1. Punctate acute/subacute infarct in the paramedian left occipital  lobe. No definite findings of acute intracranial hemorrhage or  significant mass effect.  2. Several probable chronic parenchymal microhemorrhages primarily  in  a superficial cortical/subcortical distribution.  3. Mild generalized brain parenchymal volume loss and presumed chronic  small vessel ischemic changes.    EVA NETTLES MD         SYSTEM ID:  [id number]   X-ray Chest 2 vws*    Narrative    XR CHEST 2 VIEWS 8/3/2022 7:48 AM    HISTORY: Status post pacer/ICD    COMPARISON: 5/17/2018 and images from pacemaker placement yesterday.      Impression    IMPRESSION: Left subclavian transvenous pacemaker has been placed with  right ventricular and right atrial leads. No pneumothorax. No  infiltrate or pleural effusion. Normal heart size. Aortic and mitral  valvular prosthesis and CABG. Tortuous aorta with atherosclerotic  calcifications. Partly visualized abdominal aortic endograft.    RAMIN DE LEON MD         SYSTEM ID:  [id number]   EP Device    Narrative    PROCEDURES PERFORMED:  1. Implantation of dual-chamber permanent pacemaker.  2. Cardiac fluoroscopy (1.6 minutes).  3. Conscious sedation, mild-moderate level.     CLINICAL HISTORY:  82-year-old male who presents with symptomatic complete AV block.  History   of CAD, paroxysmal atrial fibrillation, mitral and aortic mechanical valve   prostheses.  No reversible cause of AV block.  Permanent pacemaker   implantation was recommended.  The risks and benefits of the procedure   were discussed in detail; the patient expressed understanding and provided   consent.       PROCEDURE:  The patient was brought to the cardiac electrophysiology laboratory at   [facility] on 8/2/2022. I determined this patient to be   an appropriate candidate for the planned sedation and procedure and have   reassessed the patient immediately prior to sedation and procedure.The   patient was in the fasting nonsedated state. Informed consent had been   obtained.  He was in complete AV block with escape rate in the low 30s.      The patient was placed on the procedure table and the anterior chest was   prepped and draped in the usual  sterile fashion.  We continuously   monitored vital signs, oxygenation and level of sedation.  Antibiotic   prophylaxis was administered.  Intravenous sedation was given to achieve   patient comfort.      Using a fluoroscopic guided technique the left subclavian vein was   cannulated without difficulty.  Two separate guidewires were introduced   and retained.  Under local anesthesia an incision was created in the left   pectoral region. The incision was taken down to the pectoralis muscle and   fascia and a pocket was created for the pacemaker generator.    Using a peel-away introducer sheath the right ventricular lead was   initially introduced.  The tip of the lead was brought at the mid   anteroseptal RV. Good sensing and pacing parameters were confirmed. 10 V   pacing did not stimulate the diaphragm. The lead was secured using   interrupted Ethibond sutures.    Using another peel-away introducer sheath the right atrial lead was   introduced. The tip of the lead was brought at the superior anterior RA.   Good sensing and pacing parameters were confirmed. 10 V pacing did not   stimulate the diaphragm. The lead was secured using interrupted Ethibond   sutures.    The device pocket was then irrigated with antibiotic solution. The leads   were then connected to the pacemaker generator which was placed in the   pocket.  The wound was closed in 2 layers using 2.0 and 4.0 Vicryl.    Steri-Strips, sterile gauze, and a pressure dressing were placed over the   wound.     There were no apparent complications. The patient was brought back to the   hospital room in stable condition.    Estimated blood loss was 15 ml.        RESULTS:  A.  Implanted leads:  (i) RV lead: [employer] lead model 7841, serial [id number].  R-wave   sensing 4-5 mV.  Pacing threshold 0.7 V at 0.4 msec.  Pacing impedance 747   ohms.    (ii) RA lead: [employer] lead model 7840, serial [id number].  P-wave   sensing 3.9 mV.  Pacing threshold 0.3 V  at 0.4 msec.  Pacing impedance 734   ohms.    B.  Pulse generator: [employer] MRI DR, model L331, serial   [id number].  C.  Programming: DDD 60/130 ppm.      CONCLUSIONS:  1.  Successful implantation of MRI-compatible dual-chamber permanent   pacemaker.  2.  No apparent in-lab complication.   Echocardiogram Complete     Value    LVEF  55-60%    [facility]    [id number]  [id number]  [id number]^PEDRO^SUNITA^IRA     [facility]  Echocardiography Laboratory  [address] [place] [address]     Name: LIANG VAUGHN  MRN: [id number]  : 1940  Study Date: 2022 08:59 AM  Age: 82 yrs  Gender: Male  Patient Location: [facility]  Reason For Study: Stroke, S/P pacemaker  Ordering Physician: SUNITA VASQUEZ  Referring Physician: Jodi Flower  Performed By: Zion Moody RDCS     BSA: 2.1 m2  Height: 66 in  Weight: 216 lb  HR: 71  BP: 143/81 mmHg  ______________________________________________________________________________  Procedure  Complete Portable Echo Adult. Optison (NDC #0375-8568) given intravenously.  ______________________________________________________________________________  Interpretation Summary     There is moderate concentric left ventricular hypertrophy.  Left ventricular systolic function is normal.  The visual ejection fraction is 55-60%.  The right ventricle is borderline dilated.  The right ventricular systolic function is normal.  Mild (35-45mmHg) pulmonary hypertension is present.  Mechanical steven valve prosthesis (21 mm St Solo). Well seated. Mean gradient  6 mmHg at HR 71 bpm. MR not appreciated due to acoustic shadowing.  Mechanical aortic valve prosthesis (27 mm St. Solo). Well seated with no  valvular or perivalvular AI. Mean gradient of 11 mmHg. Vmax 2.5 m/sec. AT <  100 msec. Normal Doppler interrogation for this valve.  The inferior vena cava was normal in size with preserved respiratory  variability.  There is no pericardial effusion.      Findings similar to study dated 7/2/2020. Gradients across both mechanical  prostheses are slightly increased compared to prior however still within  normal limits for these valve types.  ______________________________________________________________________________  Left Ventricle  The left ventricle is normal in size. There is moderate concentric left  ventricular hypertrophy. The visual ejection fraction is 55-60%. Left  ventricular systolic function is normal. Diastolic function not assessed due  to presence of prosthetic mitral valve. Septal motion is consistent with  conduction abnormality.     Right Ventricle  The right ventricle is borderline dilated. The right ventricular systolic  function is normal.     Atria  The left atrium is mildly dilated. Right atrial size is normal.     Mitral Valve  The prosthetic mitral valve is well-seated. Mechanical steven valve prosthesis  (21 mm St Solo). Well seated. Mean gradient 6 mmHg at HR 71 bpm. MR not  appreciated due to acoustic shadowing.     Tricuspid Valve  Mild (35-45mmHg) pulmonary hypertension is present. The right ventricular  systolic pressure is approximated at 34mmHg plus the right atrial pressure.     Aortic Valve  The prosthetic aortic valve is well-seated. Mechanical aortic valve prosthesis  (27 mm St. Solo). Well seated with no valvular or perivalvular AI. Mean  gradient of 11 mmHg. Vmax 2.5 m/sec. AT < 100 msec. Normal Doppler  interrogation for this valve.     Pulmonic Valve  The pulmonic valve is not well seen, but is grossly normal.     Vessels  The aortic root is normal size. Normal size ascending aorta. The inferior vena  cava was normal in size with preserved respiratory variability.     Pericardium  There is no pericardial effusion.     ______________________________________________________________________________  MMode/2D Measurements & Calculations  IVSd: 1.5 cm  LVIDd: 4.6 cm  LVIDs: 3.3 cm  LVPWd: 1.3 cm  FS: 27.8 %  LV mass(C)d: 256.8  grams  LV mass(C)dI: 124.3 grams/m2     Ao root diam: 3.2 cm  LA dimension: 5.3 cm  LA/Ao: 1.6  LVOT diam: 2.0 cm  LVOT area: 3.0 cm2  LA Volume (BP): 70.4 ml  LA Volume Index (BP): 34.0 ml/m2  RWT: 0.57     Doppler Measurements & Calculations  MV E max lev: 140.0 cm/sec  MV A max lev: 151.2 cm/sec  MV E/A: 0.93  MV max PG: 10.3 mmHg  MV mean P.9 mmHg  MV V2 VTI: 52.0 cm  MVA(VTI): 1.8 cm2  MV dec slope: 558.2 cm/sec2     Ao V2 max: 252.9 cm/sec  Ao max P.6 mmHg  Ao V2 mean: 147.6 cm/sec  Ao mean PG: 10.6 mmHg  Ao V2 VTI: 43.1 cm  CHARLEE(I,D): 2.2 cm2  CHARLEE(V,D): 1.9 cm2  Ao acc time: 0.07 sec  LV V1 max PG: 10.6 mmHg  LV V1 max: 162.8 cm/sec  LV V1 VTI: 31.6 cm  SV(LVOT): 95.6 ml  SI(LVOT): 46.3 ml/m2  PA acc time: 0.10 sec  TR max lev: 289.7 cm/sec  TR max P.6 mmHg  AV Lev Ratio (DI): 0.64  CHARLEE Index (cm2/m2): 1.1  E/E' av.3  Lateral E/e': 21.4  Medial E/e': 29.2     ______________________________________________________________________________  Report approved by: Olivia Hurley 2022 11:39 AM           *Note: Due to a large number of results and/or encounters for the requested time period, some results have not been displayed. A complete set of results can be found in Results Review.     Discharge Medications   Current Discharge Medication List      CONTINUE these medications which have NOT CHANGED    Details   acetaminophen (TYLENOL) 500 MG tablet Take 500-1,000 mg by mouth every 6 hours as needed for pain      amoxicillin-clavulanate (AUGMENTIN) 875-125 MG tablet Take 1 tablet by mouth every morning      betamethasone valerate (VALISONE) 0.1 % external lotion Apply topically 2 times daily Apply topically to scalp twice daily PRN  Qty: 60 mL, Refills: 3    Associated Diagnoses: Seborrheic dermatitis      cholecalciferol 25 MCG (1000 UT) TABS Take 1,000 Units by mouth daily      ezetimibe (ZETIA) 10 MG tablet Take 1 tablet (10 mg) by mouth daily  Qty: 90 tablet, Refills: 3     Associated Diagnoses: Mixed hyperlipidemia      ferrous sulfate (FEROSUL) 325 (65 Fe) MG tablet Take 325 mg by mouth daily (with breakfast)      fluocinonide (LIDEX) 0.05 % external ointment Apply topically 2 times daily  Qty: 60 g, Refills: 11    Associated Diagnoses: Rash      furosemide (LASIX) 40 MG tablet Take 0.5 tablets (20 mg) by mouth daily  Qty: 45 tablet, Refills: 3    Associated Diagnoses: Chronic diastolic congestive heart failure (H)      glucosamine-chondroitin 500-400 MG CAPS per capsule Take 1 capsule by mouth every evening      mesalamine (ASACOL HD) 800 MG EC tablet Take 1 tablet (800 mg) by mouth 2 times daily  Qty: 180 tablet, Refills: 3    Associated Diagnoses: Ulcerative proctitis without complication (H)      rosuvastatin (CRESTOR) 40 MG tablet Take 1 tablet (40 mg) by mouth daily  Qty: 90 tablet, Refills: 3    Associated Diagnoses: Mixed hyperlipidemia      tamsulosin (FLOMAX) 0.4 MG capsule Take 1 capsule (0.4 mg) by mouth daily  Qty: 90 capsule, Refills: 3    Associated Diagnoses: Urinary retention      triamcinolone (KENALOG) 0.1 % external cream Apply topically 2 times daily  Qty: 85.2 g, Refills: 1    Associated Diagnoses: Seborrheic dermatitis      triamcinolone (KENALOG) 0.1 % external lotion Apply topically 2 times daily  Qty: 60 mL, Refills: 1    Associated Diagnoses: Seborrheic dermatitis      warfarin ANTICOAGULANT (COUMADIN) 5 MG tablet Take 5mg every Sun & Wed / Take 7.5mg all other days OR per INR clinic  Qty: 120 tablet, Refills: 1    Comments: MOST CURRENT DOSE  Associated Diagnoses: Persistent atrial fibrillation (H); S/P mitral valve replacement; S/P aortic valve replacement; Long term current use of anticoagulants with INR goal of 2.5-3.5      !! ACE/ARB/ARNI NOT PRESCRIBED (INTENTIONAL) Please choose reason not prescribed from choices below.    Associated Diagnoses: Chronic diastolic congestive heart failure (H)      !! BETA BLOCKER NOT PRESCRIBED (INTENTIONAL) Please  choose reason not prescribed from choices below.    Associated Diagnoses: PVD (peripheral vascular disease) (H)       !! - Potential duplicate medications found. Please discuss with provider.        Allergies   Allergies   Allergen Reactions     Bees Anaphylaxis

## 2022-08-03 NOTE — PROGRESS NOTES
"EP Progress Note          Assessment and Plan:   Fausto Farr is an 82-year-old white male with a PMH of CAD (s/p CABG), AAA with repair, HTN, mechanical AVR and MVR, GAGE, and PAF. He presented to the ER with visual disturbance and was found to have complete AV block.  He stated that the visual disturbance has basically resolved after the admission.  Head CT scan did not show evidence of stroke. EP was consulted to assist with his care.    1. 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 Mechanical MVR/AVR  On warfarin- INR 1.74, off IV heparin.   Avoid heparin to minimize hematoma risk.   7.5 mg given yesterday. Pharmacy dosing     Pt prefers [facility] for follow up. Will let the Device RN know.  EP signing off        Lina Dick CNP        Interval History:   Pt feeling well and offers no concerns this morning. VSSA. Tele 100%A/V paced       Medications:       amoxicillin-clavulanate  1 tablet Oral QAM     ezetimibe  10 mg Oral Daily     furosemide  20 mg Oral Daily     mesalamine  800 mg Oral BID     rosuvastatin  40 mg Oral Daily     sodium chloride (PF)  3 mL Intracatheter Q8H     tamsulosin  0.4 mg Oral Daily     Warfarin Therapy Reminder  1 each Oral See Admin Instructions             Review of Systems: If done, described below  The Review of Systems is negative other than noted in the HPI             Physical Exam:   Blood pressure (!) 143/81, pulse 81, temperature 97.7  F (36.5  C), temperature source Oral, resp. rate 16, height 1.664 m (5' 5.5\"), weight 98.2 kg (216 lb 6.4 oz), SpO2 98 %.        Vital Sign Ranges  Temperature Temp  Av.5  F (36.4  C)  Min: 97.5  F (36.4  C)  Max: 97.6  F (36.4  C)   Blood pressure Systolic (24hrs), Av , Min:117 , Max:152        Diastolic (24hrs), Av, " Min:49, Max:78      Pulse Pulse  Av.6  Min: 36  Max: 67   Respirations Resp  Avg: 15.7  Min: 13  Max: 18   Pulse oximetry SpO2  Av.6 %  Min: 89 %  Max: 97 %       No intake or output data in the 24 hours ending 22    Constitutional:   in no apparent distress     Chest:   Pressure dressing on. No hematoma or erythema noted     Lungs:   symmetric, clear to auscultation     Cardiovascular:   regular rate and rhythm, normal S1 and S2 and no murmur noted. Strong valvular click     Extremities and Back:   Edema 1+ (R>L)              Data:     Lab Results   Component Value Date    WBC 7.2 2022    HGB 13.0 (L) 2022    HCT 40.3 2022     2022     2022    POTASSIUM 4.0 2022    CHLORIDE 107 2022    CO2 27 2022    BUN 15 2022    CR 0.91 2022     (H) 2022    SED 29 (H) 06/15/2020    TROPONIN 2.73 (HH) 2005    TROPI 0.029 2018    AST 27 2022    ALT 28 2022    ALKPHOS 56 2022    BILITOTAL 0.4 2022    INR 1.74 (H) 2022

## 2022-08-03 NOTE — PROGRESS NOTES
"Brief stroke note:  Echocardiogram reviewed: LVEF 55-60%, moderate concentric left ventricular hypertrophy, gradients across both mechanical prostheses normal, no thrombus reported    Impression:   1. Subacute left occipital infarct. Suspect secondary to cardioembolism (atrial fibrillation) in the setting of subtherapeutic INR     Recommendations:  -no updates, please see full recommendations in consult note dated 8/2    No further stroke workup is recommended, so we will sign off. Please contact us with any questions or concerns.    Maura ANGULO, CNP  Vascular Neurology  To page me or covering stroke neurology team member, click here: AMCOM   Choose \"On Call\" tab at top, then search dropdown box for \"Neurology Adult\", select location, press Enter, then look for stroke/neuro ICU/telestroke.    "

## 2022-08-03 NOTE — PROGRESS NOTES
Cross cover note.  Pain at the pacemaker insertion site.  Little relief of pain with Tylenol.    Oxycodone 2.5 mg x 1 ordered.  Further care per rounding hospitalist, discussed with bedside RN.

## 2022-08-03 NOTE — DISCHARGE INSTRUCTIONS
Discharge Instructions for Pacemaker Implantation  You have had a procedure to insert a pacemaker. Once inside your body, this small electronic device helps keep your heart from beating too slowly. A pacemaker can t fix existing heart problems. But it can help you feel better and have more energy. As you recover, follow all of the instructions you are given, including those below.  Activity  Follow the instructions you are given about limiting your activity.  Do not raise your arm on the incision side above shoulder level for 3 weeks. This gives the device lead wires time to attach securely inside your heart.  Ask your doctor when you can expect to return to work.  You can still exercise. It s good for your body and your heart. Talk with your doctor about an exercise plan.  Other Precautions  Follow your doctor's directions carefully for wound care. You may remove the outer dressing in 3 - 4 days (Friday 8/5- Saturday 8/6). Leave the steri-strips in place; these will be removed at your 1 week follow-up. Never put any creams, lotions, or products like peroxide on an incision unless your doctor tells you to.   You may shower once the outer dressing has been removed.   Before you receive any treatment, tell all health care providers (including your dentist) that you have a pacemaker.  You will be given an ID card that contains information about your pacemaker. Always carry this card with you. You can show this card if your pacemaker sets off a metal detector. You should also show it to avoid screening with a hand-held security wand.  Keep your cell phone away from your pacemaker. Don t carry the phone in your shirt pocket, even when it s turned off.  Avoid strong magnets. Examples are those used in MRIs or in hand-held security wands.  Avoid strong electrical fields. Examples are those made by radio transmitting towers,  ham  radios, and heavy-duty electrical equipment.  Avoid leaning over the open hu of a running  car. A running engine creates an electrical field. Most household and yard appliances will not cause any problems. If you use any large power tools, such as an industrial , talk with your doctor.   Follow-Up  Follow up in the device clinic as scheduled.   Friday 8/12/2022, 10:00am  [facility] [place]  [address], [address].   Park in the Skyway Ramp, walk across the skyway, stay on the 2nd floor, heart clinic is to the left of the large staircase just past the elevators.   Make regular follow-up appointments with your device clinic. They will check the pacemaker to make sure it s working properly.  When to Call Your Device Clinic [phone]  Call your Device Clinic if you have any of the following:  Dizziness  Chest pain  Lack of energy  Fainting spells  Rapid pulse or pounding heartbeat  Shortness of breath  Increased pain around your pacemaker  Fever above 100.4 F (38 C) or other signs of infection (redness, swelling, drainage, or warmth at the incision site)     0776-1796 [employer]. [address] [place] [address]. All rights reserved. This information is not intended as a substitute for professional medical care. Always follow your healthcare professional's instructions.    [facility] in [place]: [phone]  Device Clinic (Monday to Friday, 8am-4pm): [phone]  *The device clinic is closed on weekends and holidays.  Any calls received during this time will be answered on the next business  day. For any urgent questions after hours, please call the main clinic number and you will be put in contact with the cardiologist on call.

## 2022-08-03 NOTE — PROVIDER NOTIFICATION
"MD Notification    Notified Person: MD Dr Gutierrez    Notified Person Name:    Notification Date/Time: 1627    Notification Interaction: [employer] messaging    Purpose of Notification:Pt reported \"some blurriness\"    Orders Received:     Comments:    1627- Pt reporting \"slight blurriness\" in both eyes, says it has gotten better since admission. Pt also reports not having glasses at hospital. Neuro provider has in notes that pt denied visual residuals today. CMS intact and all other neuros intact. VSS. Please advise, thanks.    4515- Just confirming that you wanted continue to monitor for now. Thanks.      2105 MD-Yes would continue current stroke work-up/treatment.          "

## 2022-08-04 ENCOUNTER — PATIENT OUTREACH (OUTPATIENT)
Dept: CARE COORDINATION | Facility: CLINIC | Age: 82
End: 2022-08-04

## 2022-08-04 ENCOUNTER — ANTICOAGULATION THERAPY VISIT (OUTPATIENT)
Dept: ANTICOAGULATION | Facility: CLINIC | Age: 82
End: 2022-08-04

## 2022-08-04 ENCOUNTER — LAB (OUTPATIENT)
Dept: LAB | Facility: CLINIC | Age: 82
End: 2022-08-04
Payer: MEDICARE

## 2022-08-04 DIAGNOSIS — I48.19 PERSISTENT ATRIAL FIBRILLATION (H): ICD-10-CM

## 2022-08-04 DIAGNOSIS — Z79.01 LONG TERM CURRENT USE OF ANTICOAGULANT THERAPY: ICD-10-CM

## 2022-08-04 DIAGNOSIS — Z95.2 S/P AORTIC VALVE REPLACEMENT: Primary | ICD-10-CM

## 2022-08-04 DIAGNOSIS — Z71.89 OTHER SPECIFIED COUNSELING: ICD-10-CM

## 2022-08-04 DIAGNOSIS — Z95.2 S/P MITRAL VALVE REPLACEMENT: ICD-10-CM

## 2022-08-04 DIAGNOSIS — Z95.2 S/P AORTIC VALVE REPLACEMENT: ICD-10-CM

## 2022-08-04 LAB
ATRIAL RATE - MUSE: 61 BPM
DIASTOLIC BLOOD PRESSURE - MUSE: NORMAL MMHG
INR BLD: 2.3 (ref 0.9–1.1)
INTERPRETATION ECG - MUSE: NORMAL
P AXIS - MUSE: 68 DEGREES
PR INTERVAL - MUSE: NORMAL MS
QRS DURATION - MUSE: 146 MS
QT - MUSE: 540 MS
QTC - MUSE: 456 MS
R AXIS - MUSE: -69 DEGREES
SYSTOLIC BLOOD PRESSURE - MUSE: NORMAL MMHG
T AXIS - MUSE: 96 DEGREES
VENTRICULAR RATE- MUSE: 43 BPM

## 2022-08-04 PROCEDURE — 85610 PROTHROMBIN TIME: CPT

## 2022-08-04 PROCEDURE — 36416 COLLJ CAPILLARY BLOOD SPEC: CPT

## 2022-08-04 NOTE — PROGRESS NOTES
Clinic Care Coordination Contact  [facility]/Voicemail       Clinical Data: Care Coordinator Outreach  Outreach attempted x 1.  Left message on patient's voicemail with call back information and requested return call.  Plan: Care Coordinator will try to reach patient again in 1-2 business days.        GILLIAN Hay  [phone]  [facility]

## 2022-08-05 LAB
ATRIAL RATE - MUSE: 63 BPM
DIASTOLIC BLOOD PRESSURE - MUSE: NORMAL MMHG
INTERPRETATION ECG - MUSE: NORMAL
P AXIS - MUSE: NORMAL DEGREES
PR INTERVAL - MUSE: 254 MS
QRS DURATION - MUSE: 168 MS
QT - MUSE: 494 MS
QTC - MUSE: 505 MS
R AXIS - MUSE: -72 DEGREES
SYSTOLIC BLOOD PRESSURE - MUSE: NORMAL MMHG
T AXIS - MUSE: 8 DEGREES
VENTRICULAR RATE- MUSE: 63 BPM

## 2022-08-05 NOTE — PROGRESS NOTES
ANTICOAGULATION MANAGEMENT     Fausto Farr 82 year old male is on warfarin with therapeutic INR result. (Goal INR 2.5-3.5)    Recent labs: (last 7 days)     08/04/22  1241   INR 2.3*       ASSESSMENT       Source(s): Chart Review and Patient/Caregiver Call       Warfarin doses taken: Warfarin taken as instructed.  Warfarin was intentionally held during hospitalization resulting in low INR.  Patient took boost dose on 8/3 as recommended.    Diet: No new diet changes identified    New illness, injury, or hospitalization: Yes: hospitalized 8/1-8/3 for pacemaker placement     Medication/supplement changes: None noted    Signs or symptoms of bleeding or clotting: No.     Previous INR: Subtherapeutic    Additional findings: Patient's home number was not working properly so he was not able to get messages. Call patient's wife on cell number if not able to reach patient on the land line.       PLAN     Recommended plan for temporary change(s) affecting INR     Dosing Instructions: Continue your current warfarin dose with next INR in 4 days       Summary  As of 8/4/2022    Full warfarin instructions:  5 mg every Sun, Wed; 7.5 mg all other days   Next INR check:  8/8/2022             Telephone call with Ralph who verbalizes understanding and agrees to plan    Check at provider office visit    Education provided: Please call back if any changes to your diet, medications or how you've been taking warfarin    Plan made per [facility] anticoagulation protocol    Ruthann Sanders RN  Anticoagulation Clinic  8/5/2022    _______________________________________________________________________     Anticoagulation Episode Summary     Current INR goal:  2.5-3.5   TTR:  75.8 % (1 y)   Target end date:  Indefinite   Send INR reminders to:  SHANNA DOWELL    Indications    S/P aortic valve replacement [Z95.2]  S/P mitral valve replacement [Z95.2]  Long term current use of anticoagulant therapy [Z79.01]  Persistent atrial fibrillation (H)  [I48.19]           Comments:           Anticoagulation Care Providers     Provider Role Specialty Phone number    Jodi Flower Mai, MD Referring Internal Medicine - Pediatrics [phone]

## 2022-08-05 NOTE — PROGRESS NOTES
Clinic Care Coordination Contact  [facility]/Voicemail       Clinical Data: Care Coordinator Outreach  Outreach attempted x 2.  Left message on patient's voicemail with call back information and requested return call.  Plan: Care Coordinator will do no further outreaches at this time.        GILLIAN Hay  [phone]  [facility]

## 2022-08-08 ENCOUNTER — TELEPHONE (OUTPATIENT)
Dept: PEDIATRICS | Facility: CLINIC | Age: 82
End: 2022-08-08

## 2022-08-08 ENCOUNTER — LAB (OUTPATIENT)
Dept: LAB | Facility: CLINIC | Age: 82
End: 2022-08-08

## 2022-08-08 ENCOUNTER — OFFICE VISIT (OUTPATIENT)
Dept: PEDIATRICS | Facility: CLINIC | Age: 82
End: 2022-08-08
Payer: MEDICARE

## 2022-08-08 VITALS
DIASTOLIC BLOOD PRESSURE: 64 MMHG | HEART RATE: 77 BPM | HEIGHT: 66 IN | BODY MASS INDEX: 35 KG/M2 | RESPIRATION RATE: 16 BRPM | SYSTOLIC BLOOD PRESSURE: 140 MMHG | OXYGEN SATURATION: 97 % | TEMPERATURE: 97.3 F | WEIGHT: 217.8 LBS

## 2022-08-08 DIAGNOSIS — Z23 HIGH PRIORITY FOR 2019-NCOV VACCINE: ICD-10-CM

## 2022-08-08 DIAGNOSIS — K51.20 ULCERATIVE PROCTITIS WITHOUT COMPLICATION (H): ICD-10-CM

## 2022-08-08 DIAGNOSIS — Z09 HOSPITAL DISCHARGE FOLLOW-UP: Primary | ICD-10-CM

## 2022-08-08 DIAGNOSIS — I44.2 THIRD DEGREE HEART BLOCK BY ELECTROCARDIOGRAM (H): ICD-10-CM

## 2022-08-08 DIAGNOSIS — E78.2 MIXED HYPERLIPIDEMIA: ICD-10-CM

## 2022-08-08 DIAGNOSIS — H53.9 VISION CHANGES: ICD-10-CM

## 2022-08-08 DIAGNOSIS — I48.19 PERSISTENT ATRIAL FIBRILLATION (H): ICD-10-CM

## 2022-08-08 LAB — INR PPP: 3.01 (ref 0.85–1.15)

## 2022-08-08 PROCEDURE — 99495 TRANSJ CARE MGMT MOD F2F 14D: CPT | Mod: 25 | Performed by: INTERNAL MEDICINE

## 2022-08-08 PROCEDURE — 85610 PROTHROMBIN TIME: CPT

## 2022-08-08 PROCEDURE — 0064A COVID-19,PF,MODERNA (18+ YRS BOOSTER .25ML): CPT | Performed by: INTERNAL MEDICINE

## 2022-08-08 PROCEDURE — 36415 COLL VENOUS BLD VENIPUNCTURE: CPT

## 2022-08-08 PROCEDURE — 91306 COVID-19,PF,MODERNA (18+ YRS BOOSTER .25ML): CPT | Performed by: INTERNAL MEDICINE

## 2022-08-08 RX ORDER — ROSUVASTATIN CALCIUM 40 MG/1
40 TABLET, COATED ORAL DAILY
Qty: 90 TABLET | Refills: 3 | Status: SHIPPED | OUTPATIENT
Start: 2022-08-08 | End: 2023-07-11

## 2022-08-08 RX ORDER — MESALAMINE 800 MG/1
1 TABLET, DELAYED RELEASE ORAL 2 TIMES DAILY
Qty: 180 TABLET | Refills: 3 | Status: SHIPPED | OUTPATIENT
Start: 2022-08-08 | End: 2023-07-11

## 2022-08-08 RX ORDER — EZETIMIBE 10 MG/1
10 TABLET ORAL DAILY
Qty: 90 TABLET | Refills: 3 | Status: SHIPPED | OUTPATIENT
Start: 2022-08-08 | End: 2023-07-11

## 2022-08-08 ASSESSMENT — PAIN SCALES - GENERAL: PAINLEVEL: NO PAIN (0)

## 2022-08-08 NOTE — TELEPHONE ENCOUNTER
Reason for Call:  Other call back    Detailed comments: patient called and requesting a call back from INR     Phone Number Patient can be reached at: Other phone number: [phone]    Best Time: any     Can we leave a detailed message on this number? YES    Call taken on 8/8/2022 at 1:52 PM by Lucy Juarez

## 2022-08-08 NOTE — TELEPHONE ENCOUNTER
Returned call to patient.  Venous INR was drawn today, but results are still pending.  Patient was calling to find out results of INR.    FYI, call number given in this encounter once results are in since land line is not working at this time.

## 2022-08-08 NOTE — PATIENT INSTRUCTIONS
It was good to see you today.  I recommend you follow-up with me in 6 months for your regular diabetes check.  Let me know if you have any other questions or concerns.

## 2022-08-08 NOTE — PROGRESS NOTES
Assessment & Plan     Hospital discharge follow-up  Pt presented last week for regular physical with complaints of dizziness and fatigue and unilateral visual changes.  Sent to ER due to 3rd degree heart block.    Third degree heart block s/p pacemaker  S/p pacemaker - is doing well.    Unilateral Vision changes  Had transient ischemic event, this was presumed to be due to embolic event 2/2 afib and subtherapeutic INR.    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  - mesalamine (ASACOL HD) 800 MG EC tablet; Take 1 tablet (800 mg) by mouth 2 times daily    Mixed hyperlipidemia  - Stable, no side effects with medications, refilled meds today  - rosuvastatin (CRESTOR) 40 MG tablet; Take 1 tablet (40 mg) by mouth daily  - ezetimibe (ZETIA) 10 MG tablet; Take 1 tablet (10 mg) by mouth daily    High priority for 2019-nCoV vaccine  - COVID-19,PF,MODERNA (18+ Yrs BOOSTER .25mL)       Patient Instructions   It was good to see you today.  I recommend you follow-up with me in 6 months for your regular diabetes check.  Let me know if you have any other questions or concerns.      Return in about 6 months (around 2/8/2023) for In-Clinic Visit diabetes f/up.    Chris Flower MD  [facility] DELMER Rosas is a 82 year old, presenting for the following health issues:  Hospital F/U and Imm/Inj (COVID-19 VACCINE)      \A Chronology of [place] Hospitals\""       Hospital Follow-up Visit:    Hospital/Nursing Home/IP Rehab Facility: [facility]  Date of Admission: 8/1/2022  Date of Discharge: 8/3/2022  Reason(s) for Admission: bradycardia     Was your hospitalization related to COVID-19? No   Problems taking medications regularly:  None  Medication changes since discharge: None  Problems adhering to non-medication therapy:  None    Summary of hospitalization:  [facility] discharge summary reviewed  Diagnostic Tests/Treatments reviewed.  Follow up needed: none  Other  "Healthcare Providers Involved in Patient s Care:         None  Update since discharge: improved. Post Medication Reconciliation Status:        Plan of care communicated with patient         Review of Systems   All other systems on a 10-point review are negative, unless otherwise noted in HPI        Objective    BP (!) 140/64   Pulse 77   Temp 97.3  F (36.3  C) (Tympanic)   Resp 16   Ht 1.664 m (5' 5.5\")   Wt 98.8 kg (217 lb 12.8 oz)   SpO2 97%   BMI 35.69 kg/m     Body mass index is 35.69 kg/m .  Physical Exam   GENERAL: healthy, alert and no distress  NECK: no adenopathy, no asymmetry, masses, or scars and thyroid normal to palpation  RESP: lungs clear to auscultation - no rales, rhonchi or wheezes  CV: regular rate and rhythm, normal S1 S2, no S3 or S4, no murmur, click or rub, no peripheral edema and peripheral pulses strong  MS: no gross musculoskeletal defects noted, no edema                    .  ..  "

## 2022-08-09 ENCOUNTER — ANTICOAGULATION THERAPY VISIT (OUTPATIENT)
Dept: ANTICOAGULATION | Facility: CLINIC | Age: 82
End: 2022-08-09

## 2022-08-09 DIAGNOSIS — Z79.01 LONG TERM CURRENT USE OF ANTICOAGULANT THERAPY: ICD-10-CM

## 2022-08-09 DIAGNOSIS — Z95.2 S/P MITRAL VALVE REPLACEMENT: ICD-10-CM

## 2022-08-09 DIAGNOSIS — Z95.2 S/P AORTIC VALVE REPLACEMENT: Primary | ICD-10-CM

## 2022-08-09 DIAGNOSIS — I48.19 PERSISTENT ATRIAL FIBRILLATION (H): ICD-10-CM

## 2022-08-09 NOTE — PROGRESS NOTES
ANTICOAGULATION MANAGEMENT     Fausto Farr 82 year old male is on warfarin with therapeutic INR result. (Goal INR 2.5-3.5)    Recent labs: (last 7 days)     08/08/22  1132   INR 3.01*       ASSESSMENT       Source(s): Chart Review    Previous INR was Subtherapeutic    Medication, diet, health changes since last INR chart reviewed; none identified           PLAN     Unable to reach Ralph today.    Unable to leave voicemail.  Will try patient again later today.    Follow up required to confirm warfarin dose taken and assess for changes    Ruthann Sanders RN  Anticoagulation Clinic  8/9/2022

## 2022-08-09 NOTE — PROGRESS NOTES
ANTICOAGULATION MANAGEMENT     Fausto Farr 82 year old male is on warfarin with therapeutic INR result. (Goal INR 2.5-3.5)    Recent labs: (last 7 days)     08/08/22  1132   INR 3.01*       ASSESSMENT       Source(s): Patient/Caregiver Call       Warfarin doses taken: Warfarin taken as instructed    Diet: No new diet changes identified    New illness, injury, or hospitalization: Yes: pacemaker placed on 8/2.  Patient reports that he feels well today, but continues to heal.    Medication/supplement changes: None noted    Signs or symptoms of bleeding or clotting: No    Previous INR: Subtherapeutic    Additional findings: None       PLAN     Recommended plan for no diet, medication or health factor changes affecting INR     Dosing Instructions: Continue your current warfarin dose with next INR in 2 weeks       Summary  As of 8/9/2022    Full warfarin instructions:  5 mg every Sun, Wed; 7.5 mg all other days   Next INR check:  8/22/2022             Telephone call with Ralph who agrees to plan and repeated back plan correctly    Lab visit scheduled    Education provided: Please call back if any changes to your diet, medications or how you've been taking warfarin    Plan made per [facility] anticoagulation protocol    Ruthann Sanders RN  Anticoagulation Clinic  8/9/2022    _______________________________________________________________________     Anticoagulation Episode Summary     Current INR goal:  2.5-3.5   TTR:  75.5 % (1 y)   Target end date:  Indefinite   Send INR reminders to:  SHANNA DOWELL    Indications    S/P aortic valve replacement [Z95.2]  S/P mitral valve replacement [Z95.2]  Long term current use of anticoagulant therapy [Z79.01]  Persistent atrial fibrillation (H) [I48.19]           Comments:           Anticoagulation Care Providers     Provider Role Specialty Phone number    Jodi Flower Mai, MD Referring Internal Medicine - Pediatrics [phone]

## 2022-08-12 ENCOUNTER — ANCILLARY PROCEDURE (OUTPATIENT)
Dept: CARDIOLOGY | Facility: CLINIC | Age: 82
End: 2022-08-12
Attending: INTERNAL MEDICINE
Payer: MEDICARE

## 2022-08-12 ENCOUNTER — TELEPHONE (OUTPATIENT)
Dept: CARDIOLOGY | Facility: CLINIC | Age: 82
End: 2022-08-12

## 2022-08-12 DIAGNOSIS — I47.29 NSVT (NONSUSTAINED VENTRICULAR TACHYCARDIA) (H): Primary | ICD-10-CM

## 2022-08-12 DIAGNOSIS — I44.2 COMPLETE ATRIOVENTRICULAR BLOCK (H): ICD-10-CM

## 2022-08-12 DIAGNOSIS — Z95.0 CARDIAC PACEMAKER IN SITU: ICD-10-CM

## 2022-08-12 PROCEDURE — 93280 PM DEVICE PROGR EVAL DUAL: CPT | Performed by: INTERNAL MEDICINE

## 2022-08-12 NOTE — TELEPHONE ENCOUNTER
Wagner Noriega  This is a shared pt of ours, had atrial arrhythmia and ablate  Pvc and nsvt  New echo nl ef but valve surgery and cabg  Currently off bb due to ???  Do you want me to do anything (?re try bb except we keep stop for ?low hr? But has pacer  ) or gxt or nothing or do you want to see him again  Thanks  gianna

## 2022-08-12 NOTE — TELEPHONE ENCOUNTER
Pt in clinic for 1 week PPM check. He had an episode of NSVT.   Ventricular Arrhythmia: One NSVT episode logged. EMG shows 14 bts NSVT rate 185.  Pt asymptomatic. EF: 55-60%    This is pts first episode of NSVT will send update to Dr Santo per protocol.

## 2022-08-13 NOTE — TELEPHONE ENCOUNTER
Wagner Small,  He had not been on a BB (for years) because of lashae/intermittent 2nd degree AVB.  Now that he has a PM he should of course be started on a BB given NSVT and structural heart dz.  EF is normal.    That is all I would do for now for his asx NSVT.    Device RN, can you please call Ralph and start Toprol XL 50 mg daily?    Thx, D

## 2022-08-15 DIAGNOSIS — I47.29 NSVT (NONSUSTAINED VENTRICULAR TACHYCARDIA) (H): ICD-10-CM

## 2022-08-15 RX ORDER — METOPROLOL SUCCINATE 50 MG/1
50 TABLET, EXTENDED RELEASE ORAL DAILY
Qty: 90 TABLET | Refills: 3 | Status: SHIPPED | OUTPATIENT
Start: 2022-08-15 | End: 2022-08-15

## 2022-08-15 RX ORDER — METOPROLOL SUCCINATE 50 MG/1
50 TABLET, EXTENDED RELEASE ORAL DAILY
Qty: 90 TABLET | Refills: 3 | Status: SHIPPED | OUTPATIENT
Start: 2022-08-15 | End: 2022-09-29

## 2022-08-15 NOTE — TELEPHONE ENCOUNTER
Spoke with Pt discussed pacemaker findings NSVT. Went over needing BB for slowing down HR. Reviewed side effects of BB and dosing Toprol/metoprolol ER 50 mg once a day he will take in evening. RX sent to pharmacy, and scheduling previously messaged to set up appt with EP/NP/CArdiology. MAHSA Rich Rn

## 2022-08-15 NOTE — TELEPHONE ENCOUNTER
Called spoke with Wife will she will have Pt call to discuss Beta blocker. Did message scheduling to have Pt seen has been over 17 months. MAHSA Rich RN

## 2022-08-15 NOTE — TELEPHONE ENCOUNTER
Wagner escobar  This pt was off bb due to bradycardia but they now have a pacer and I dont know why the bb was never restarted  I wonder if patient was reluctant due to past bradycardia but you can assure him ok to start toprol xl 50mg/day and then have him follow-up in pacer clinic in 1-2 months for recheck on amount of vtach then I or my np can see him  (Story makes little sense why never back on bb after pacer)  Thanks to dr darius Small

## 2022-08-23 ENCOUNTER — ANTICOAGULATION THERAPY VISIT (OUTPATIENT)
Dept: ANTICOAGULATION | Facility: CLINIC | Age: 82
End: 2022-08-23

## 2022-08-23 ENCOUNTER — LAB (OUTPATIENT)
Dept: LAB | Facility: CLINIC | Age: 82
End: 2022-08-23
Payer: MEDICARE

## 2022-08-23 DIAGNOSIS — Z79.01 LONG TERM CURRENT USE OF ANTICOAGULANT THERAPY: ICD-10-CM

## 2022-08-23 DIAGNOSIS — Z95.2 S/P AORTIC VALVE REPLACEMENT: ICD-10-CM

## 2022-08-23 DIAGNOSIS — I48.19 PERSISTENT ATRIAL FIBRILLATION (H): ICD-10-CM

## 2022-08-23 DIAGNOSIS — Z95.2 S/P MITRAL VALVE REPLACEMENT: ICD-10-CM

## 2022-08-23 DIAGNOSIS — Z95.2 S/P AORTIC VALVE REPLACEMENT: Primary | ICD-10-CM

## 2022-08-23 LAB
INR BLD: 4.6 (ref 0.9–1.1)
MDC_IDC_LEAD_IMPLANT_DT: NORMAL
MDC_IDC_LEAD_IMPLANT_DT: NORMAL
MDC_IDC_LEAD_LOCATION: NORMAL
MDC_IDC_LEAD_LOCATION: NORMAL
MDC_IDC_LEAD_LOCATION_DETAIL_1: NORMAL
MDC_IDC_LEAD_LOCATION_DETAIL_1: NORMAL
MDC_IDC_LEAD_MFG: NORMAL
MDC_IDC_LEAD_MFG: NORMAL
MDC_IDC_LEAD_MODEL: NORMAL
MDC_IDC_LEAD_MODEL: NORMAL
MDC_IDC_LEAD_POLARITY_TYPE: NORMAL
MDC_IDC_LEAD_POLARITY_TYPE: NORMAL
MDC_IDC_LEAD_SERIAL: NORMAL
MDC_IDC_LEAD_SERIAL: NORMAL
MDC_IDC_MSMT_BATTERY_DTM: NORMAL
MDC_IDC_MSMT_BATTERY_REMAINING_LONGEVITY: 168 MO
MDC_IDC_MSMT_BATTERY_REMAINING_PERCENTAGE: 100 %
MDC_IDC_MSMT_BATTERY_STATUS: NORMAL
MDC_IDC_MSMT_LEADCHNL_RA_IMPEDANCE_VALUE: 633 OHM
MDC_IDC_MSMT_LEADCHNL_RA_PACING_THRESHOLD_AMPLITUDE: 0.7 V
MDC_IDC_MSMT_LEADCHNL_RA_PACING_THRESHOLD_PULSEWIDTH: 0.4 MS
MDC_IDC_MSMT_LEADCHNL_RV_IMPEDANCE_VALUE: 653 OHM
MDC_IDC_MSMT_LEADCHNL_RV_PACING_THRESHOLD_AMPLITUDE: 0.7 V
MDC_IDC_MSMT_LEADCHNL_RV_PACING_THRESHOLD_PULSEWIDTH: 0.4 MS
MDC_IDC_PG_IMPLANT_DTM: NORMAL
MDC_IDC_PG_MFG: NORMAL
MDC_IDC_PG_MODEL: NORMAL
MDC_IDC_PG_SERIAL: NORMAL
MDC_IDC_PG_TYPE: NORMAL
MDC_IDC_SESS_CLINIC_NAME: NORMAL
MDC_IDC_SESS_DTM: NORMAL
MDC_IDC_SESS_TYPE: NORMAL
MDC_IDC_SET_BRADY_AT_MODE_SWITCH_MODE: NORMAL
MDC_IDC_SET_BRADY_AT_MODE_SWITCH_RATE: 170 {BEATS}/MIN
MDC_IDC_SET_BRADY_LOWRATE: 60 {BEATS}/MIN
MDC_IDC_SET_BRADY_MAX_SENSOR_RATE: 130 {BEATS}/MIN
MDC_IDC_SET_BRADY_MAX_TRACKING_RATE: 130 {BEATS}/MIN
MDC_IDC_SET_BRADY_MODE: NORMAL
MDC_IDC_SET_BRADY_PAV_DELAY_HIGH: 200 MS
MDC_IDC_SET_BRADY_PAV_DELAY_LOW: 250 MS
MDC_IDC_SET_BRADY_SAV_DELAY_HIGH: 200 MS
MDC_IDC_SET_BRADY_SAV_DELAY_LOW: 250 MS
MDC_IDC_SET_LEADCHNL_RA_PACING_AMPLITUDE: 2 V
MDC_IDC_SET_LEADCHNL_RA_PACING_CAPTURE_MODE: NORMAL
MDC_IDC_SET_LEADCHNL_RA_PACING_POLARITY: NORMAL
MDC_IDC_SET_LEADCHNL_RA_PACING_PULSEWIDTH: 0.4 MS
MDC_IDC_SET_LEADCHNL_RA_SENSING_ADAPTATION_MODE: NORMAL
MDC_IDC_SET_LEADCHNL_RA_SENSING_POLARITY: NORMAL
MDC_IDC_SET_LEADCHNL_RA_SENSING_SENSITIVITY: 0.25 MV
MDC_IDC_SET_LEADCHNL_RV_PACING_AMPLITUDE: 1.2 V
MDC_IDC_SET_LEADCHNL_RV_PACING_CAPTURE_MODE: NORMAL
MDC_IDC_SET_LEADCHNL_RV_PACING_POLARITY: NORMAL
MDC_IDC_SET_LEADCHNL_RV_PACING_PULSEWIDTH: 0.4 MS
MDC_IDC_SET_LEADCHNL_RV_SENSING_ADAPTATION_MODE: NORMAL
MDC_IDC_SET_LEADCHNL_RV_SENSING_POLARITY: NORMAL
MDC_IDC_SET_LEADCHNL_RV_SENSING_SENSITIVITY: 0.6 MV
MDC_IDC_SET_ZONE_DETECTION_INTERVAL: 375 MS
MDC_IDC_SET_ZONE_TYPE: NORMAL
MDC_IDC_SET_ZONE_VENDOR_TYPE: NORMAL
MDC_IDC_STAT_AT_BURDEN_PERCENT: 0 %
MDC_IDC_STAT_AT_DTM_END: NORMAL
MDC_IDC_STAT_AT_DTM_START: NORMAL
MDC_IDC_STAT_BRADY_DTM_END: NORMAL
MDC_IDC_STAT_BRADY_DTM_START: NORMAL
MDC_IDC_STAT_BRADY_RA_PERCENT_PACED: 7 %
MDC_IDC_STAT_BRADY_RV_PERCENT_PACED: 88 %
MDC_IDC_STAT_EPISODE_RECENT_COUNT: 0
MDC_IDC_STAT_EPISODE_RECENT_COUNT: 1
MDC_IDC_STAT_EPISODE_RECENT_COUNT_DTM_END: NORMAL
MDC_IDC_STAT_EPISODE_RECENT_COUNT_DTM_START: NORMAL
MDC_IDC_STAT_EPISODE_TYPE: NORMAL
MDC_IDC_STAT_EPISODE_VENDOR_TYPE: NORMAL

## 2022-08-23 PROCEDURE — 36416 COLLJ CAPILLARY BLOOD SPEC: CPT

## 2022-08-23 PROCEDURE — 85610 PROTHROMBIN TIME: CPT

## 2022-08-23 NOTE — PROGRESS NOTES
ANTICOAGULATION MANAGEMENT     Fausto Farr 82 year old male is on warfarin with supratherapeutic INR result. (Goal INR 2.5-3.5)    Recent labs: (last 7 days)     08/23/22  0822   INR 4.6*       ASSESSMENT       Source(s):       Warfarin doses taken: Warfarin taken as instructed    Diet: No new diet changes identified    New illness, injury, or hospitalization: Yes: neck pain x 3-4 days but patient denies injury, he is considering seeing a chiropractor. Patient states the pain feels better during the day, gets worse at night.    Medication/supplement changes: patient reports that he took 3600mg of Tylenol x 3 days, took 2400mg on 8/22/22.    Signs or symptoms of bleeding or clotting: No    Previous INR: Therapeutic last visit; previously outside of goal range    Additional findings: None       PLAN     Recommended plan for temporary change(s) affecting INR     Dosing Instructions: partial hold then continue your current warfarin dose with next INR in 9 days       Summary  As of 8/23/2022    Full warfarin instructions:  8/23: 2.5 mg; Otherwise 5 mg every Sun, Wed; 7.5 mg all other days   Next INR check:  9/1/2022             Telephone call with Ralph who agrees to plan and repeated back plan correctly    Lab visit scheduled    Education provided: Importance of consistent vitamin K intake, Vitamin K content of foods, Monitoring for bleeding signs and symptoms and Contact [phone]  with any changes, questions or concerns.     Plan made per [facility] anticoagulation protocol    Aure Sampson RN  Anticoagulation Clinic  8/23/2022    _______________________________________________________________________     Anticoagulation Episode Summary     Current INR goal:  2.5-3.5   TTR:  75.9 % (1 y)   Target end date:  Indefinite   Send INR reminders to:  SHANNA DOWELL    Indications    S/P aortic valve replacement [Z95.2]  S/P mitral valve replacement [Z95.2]  Long term current use of anticoagulant therapy  [Z79.01]  Persistent atrial fibrillation (H) [I48.19]           Comments:           Anticoagulation Care Providers     Provider Role Specialty Phone number    Jodi Flower Mai, MD Referring Internal Medicine - Pediatrics [phone]

## 2022-09-01 ENCOUNTER — ANTICOAGULATION THERAPY VISIT (OUTPATIENT)
Dept: ANTICOAGULATION | Facility: CLINIC | Age: 82
End: 2022-09-01

## 2022-09-01 ENCOUNTER — LAB (OUTPATIENT)
Dept: LAB | Facility: CLINIC | Age: 82
End: 2022-09-01
Payer: MEDICARE

## 2022-09-01 DIAGNOSIS — Z95.2 S/P MITRAL VALVE REPLACEMENT: ICD-10-CM

## 2022-09-01 DIAGNOSIS — Z79.01 LONG TERM CURRENT USE OF ANTICOAGULANT THERAPY: ICD-10-CM

## 2022-09-01 DIAGNOSIS — Z95.2 S/P AORTIC VALVE REPLACEMENT: ICD-10-CM

## 2022-09-01 DIAGNOSIS — I48.19 PERSISTENT ATRIAL FIBRILLATION (H): ICD-10-CM

## 2022-09-01 DIAGNOSIS — Z95.2 S/P AORTIC VALVE REPLACEMENT: Primary | ICD-10-CM

## 2022-09-01 LAB — INR BLD: 3.5 (ref 0.9–1.1)

## 2022-09-01 PROCEDURE — 85610 PROTHROMBIN TIME: CPT

## 2022-09-01 PROCEDURE — 36416 COLLJ CAPILLARY BLOOD SPEC: CPT

## 2022-09-01 NOTE — PROGRESS NOTES
ANTICOAGULATION MANAGEMENT     Fausto Farr 82 year old male is on warfarin with therapeutic INR result. (Goal INR 2.5-3.5)    Recent labs: (last 7 days)     09/01/22  0928   INR 3.5*       ASSESSMENT       Source(s): Chart Review and Patient/Caregiver Call       Warfarin doses taken: Warfarin taken as instructed    Diet: No new diet changes identified    New illness, injury, or hospitalization: No - continues with neck pain, saw chiropractor yesterday, will probably call to schedule another appointment in the next few days    Medication/supplement changes: patient reports he has decreased Tylenol usage    Signs or symptoms of bleeding or clotting: No    Previous INR: Supratherapeutic    Additional findings: None       PLAN     Recommended plan for no diet, medication or health factor changes affecting INR     Dosing Instructions: Continue your current warfarin dose with next INR in 2 weeks       Summary  As of 9/1/2022    Full warfarin instructions:  5 mg every Sun, Wed; 7.5 mg all other days   Next INR check:  9/14/2022             Telephone call with Ralph who verbalizes understanding and agrees to plan    Lab visit scheduled    Education provided: Please call back if any changes to your diet, medications or how you've been taking warfarin and Contact [phone]  with any changes, questions or concerns.     Plan made per [facility] anticoagulation protocol    Cece Rios RN  Anticoagulation Clinic  9/1/2022    _______________________________________________________________________     Anticoagulation Episode Summary     Current INR goal:  2.5-3.5   TTR:  74.4 % (1 y)   Target end date:  Indefinite   Send INR reminders to:  SHANNA DOWELL    Indications    S/P aortic valve replacement [Z95.2]  S/P mitral valve replacement [Z95.2]  Long term current use of anticoagulant therapy [Z79.01]  Persistent atrial fibrillation (H) [I48.19]           Comments:           Anticoagulation Care Providers     Provider Role  Specialty Phone number    Jodi Flower Mai, MD Referring Internal Medicine - Pediatrics [phone]

## 2022-09-06 ENCOUNTER — TELEPHONE (OUTPATIENT)
Dept: CARDIOLOGY | Facility: CLINIC | Age: 82
End: 2022-09-06

## 2022-09-06 NOTE — TELEPHONE ENCOUNTER
Called Pt informed him scheduling would call to change his Office visit from 9/16/22 to seeing and NP for f/U of medication change. MAHSA Rich RN

## 2022-09-13 ENCOUNTER — TELEPHONE (OUTPATIENT)
Dept: CARDIOLOGY | Facility: CLINIC | Age: 82
End: 2022-09-13

## 2022-09-13 DIAGNOSIS — Z95.0 CARDIAC PACEMAKER IN SITU: Primary | ICD-10-CM

## 2022-09-13 DIAGNOSIS — I44.2 COMPLETE ATRIOVENTRICULAR BLOCK (H): ICD-10-CM

## 2022-09-13 NOTE — TELEPHONE ENCOUNTER
----- Message from Nalini Romero sent at 9/9/2022  4:42 PM CDT -----  Regarding: RE: Pleased reschedule Device check if possible to same day as OV.  Thanks  This is bc [facility] is booked with device checks until nov and they only do them once a week!   So he needs his device done in [place]  And [facility] for [facility] is booked out until Nov- so we wont r/s the bethany visit     So I had sent a message to his care teams about our challenges; they are aware     Thanks!   Nalini   ----- Message -----  From: Merlyn Isidro RN  Sent: 9/9/2022   4:38 PM CDT  To: [facility] Heart Team 2, #  Subject: Pleased reschedule Device check if possible #    chart prep for this patient's visit on 9/20/22 with Paige. There was a note in there regarding being seen for device check then gen cards by KWESI RN. I see she messaged Thompson and Odalys but am not aware what has happened with that. According to the schedule it appears the patient is seeing Paige on 9/20/22 and has a device check on 9/23/22. Please send to scheduling to change if it should be different.

## 2022-09-13 NOTE — TELEPHONE ENCOUNTER
Spoke with Device RN. They will review the device checks note . May be possible to do a remote check if needed .

## 2022-09-14 ENCOUNTER — TELEPHONE (OUTPATIENT)
Dept: PEDIATRICS | Facility: CLINIC | Age: 82
End: 2022-09-14

## 2022-09-14 ENCOUNTER — ANTICOAGULATION THERAPY VISIT (OUTPATIENT)
Dept: ANTICOAGULATION | Facility: CLINIC | Age: 82
End: 2022-09-14

## 2022-09-14 ENCOUNTER — TELEPHONE (OUTPATIENT)
Dept: OTHER | Facility: CLINIC | Age: 82
End: 2022-09-14

## 2022-09-14 ENCOUNTER — LAB (OUTPATIENT)
Dept: LAB | Facility: CLINIC | Age: 82
End: 2022-09-14
Payer: MEDICARE

## 2022-09-14 DIAGNOSIS — Z79.01 LONG TERM CURRENT USE OF ANTICOAGULANT THERAPY: ICD-10-CM

## 2022-09-14 DIAGNOSIS — Z95.2 S/P MITRAL VALVE REPLACEMENT: ICD-10-CM

## 2022-09-14 DIAGNOSIS — I48.19 PERSISTENT ATRIAL FIBRILLATION (H): ICD-10-CM

## 2022-09-14 DIAGNOSIS — Z95.2 S/P AORTIC VALVE REPLACEMENT: Primary | ICD-10-CM

## 2022-09-14 DIAGNOSIS — Z95.2 S/P AORTIC VALVE REPLACEMENT: ICD-10-CM

## 2022-09-14 LAB — INR BLD: 2.8 (ref 0.9–1.1)

## 2022-09-14 PROCEDURE — 36416 COLLJ CAPILLARY BLOOD SPEC: CPT

## 2022-09-14 PROCEDURE — 85610 PROTHROMBIN TIME: CPT

## 2022-09-14 NOTE — TELEPHONE ENCOUNTER
[facility]    Who is the name of the provider?:  Prem/[place]      What is the location you see this provider at?: [place]    Reason for call:  Spoke with patient and scheduled him for the CTA on 10/12/22 at [facility].      Patient has two questions:    1.  He got a pacemaker about 6 weeks ago.  Does this have any bearing on the having the CTA.    2.  He remembers Dr. Osborne saying something about possible future issues with an artery and he wants to know if this will be looked at with the CT.        Can we leave a detailed message on this number?  YES

## 2022-09-14 NOTE — PROGRESS NOTES
ANTICOAGULATION MANAGEMENT     Fausto Farr 82 year old male is on warfarin with therapeutic INR result. (Goal INR 2.5-3.5)    Recent labs: (last 7 days)     09/14/22  0855   INR 2.8*       ASSESSMENT       Source(s): Chart Review and Patient/Caregiver Call       Warfarin doses taken: Warfarin taken as instructed    Diet: No new diet changes identified    New illness, injury, or hospitalization: No    Medication/supplement changes: None noted    Signs or symptoms of bleeding or clotting: No    Previous INR: Therapeutic last visit; previously outside of goal range    Additional findings: None, Leaving for [place] 9/24/22, will return 10/1/22       PLAN     Recommended plan for no diet, medication or health factor changes affecting INR     Dosing Instructions: Continue your current warfarin dose with next INR in 3 weeks       Summary  As of 9/14/2022    Full warfarin instructions:  5 mg every Sun, Wed; 7.5 mg all other days   Next INR check:  10/5/2022             Telephone call with Ralph who verbalizes understanding and agrees to plan    Lab visit scheduled    Education provided: Please call back if any changes to your diet, medications or how you've been taking warfarin and Contact [phone]  with any changes, questions or concerns.     Plan made per [facility] anticoagulation protocol    Cece Rios RN  Anticoagulation Clinic  9/14/2022    _______________________________________________________________________     Anticoagulation Episode Summary     Current INR goal:  2.5-3.5   TTR:  74.3 % (1 y)   Target end date:  Indefinite   Send INR reminders to:  SHANNA DOWELL    Indications    S/P aortic valve replacement [Z95.2]  S/P mitral valve replacement [Z95.2]  Long term current use of anticoagulant therapy [Z79.01]  Persistent atrial fibrillation (H) [I48.19]           Comments:           Anticoagulation Care Providers     Provider Role Specialty Phone number    Jodi Flower Mai, MD Referring Internal  Medicine - Pediatrics [phone]

## 2022-09-14 NOTE — TELEPHONE ENCOUNTER
Received call from pt he said he missed a call from [facility] and thought it was about his INR    Writer is unable to find documentation about a RN calling about his INR    Routing to INR pool    Thank you  Julio Naranjo RN on 9/14/2022 at 10:54 AM

## 2022-09-14 NOTE — TELEPHONE ENCOUNTER
Call returned to patient. See Anticoagulation encounter dated 9/14/22 for warfarin dosing details.  Cece Rios RN, BSN  Anticoagulation Clinic

## 2022-09-15 NOTE — TELEPHONE ENCOUNTER
Returned patient phone call.  Discussed patient can have a CTA even though he has a pacemaker.  Patient is concerned about urinary frequency.  Wondering if the radiologist can look at the CT and see if there is a obvious reason.  PSA is normal, patient has history of prostate seeds.    Yesy Rao RN  IR nurse clinician  [phone]

## 2022-09-16 ENCOUNTER — TELEPHONE (OUTPATIENT)
Dept: CARDIOLOGY | Facility: CLINIC | Age: 82
End: 2022-09-16

## 2022-09-16 NOTE — TELEPHONE ENCOUNTER
----- Message from Paige Busch PA-C sent at 9/16/2022  1:31 PM CDT -----  Regarding: RE: Discharge follow up & device check  Looks like Dr. Santo wanted pacemaker eval prior to [facility] visit. Thx  ----- Message -----  From: Ena Hurley RN  Sent: 9/14/2022   9:06 AM CDT  To: Paige Busch PA-C, [facility] Heart Team 2, #  Subject: Discharge follow up & device check               See message below- no openings for in-clinic device check before you see him on 9/20, device check currently scheduled for 9/23.   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   Your appointment with him is for discharge follow up  Please let us know if it's OK to do the device check after the visit or if you prefer they do a remote transmission prior.   Thanks!   Ena   ----- Message -----  From: Marsha Skinner, JUSTIN  Sent: 9/13/2022   5:09 PM CDT  To: [facility] Heart Team 2    So I tried to see if there was an appointment available and there was not.  We currently only see patients in [place] on Wednesdays and this is his 6 week follow-up.  If Hallie wants us to send a remote transmission to see how things are, we can do that, let us know.  Thanks.

## 2022-09-16 NOTE — TELEPHONE ENCOUNTER
Device RN reply - I'm very sorry, device clinic has no openings on Sept 19th or 20th, we are very short staffed lately and already overbooked on those 2 days, we just cannot fit him in. Normally we'd do a remote check instead, but the [employer] remote monitors are on back order (chip shortage, covid related) and pt does not have his monitor yet, so we can't do that either    Message sent to Paige BELL AURELIA

## 2022-09-20 ENCOUNTER — OFFICE VISIT (OUTPATIENT)
Dept: CARDIOLOGY | Facility: CLINIC | Age: 82
End: 2022-09-20
Payer: MEDICARE

## 2022-09-20 VITALS
DIASTOLIC BLOOD PRESSURE: 52 MMHG | SYSTOLIC BLOOD PRESSURE: 126 MMHG | WEIGHT: 215.5 LBS | OXYGEN SATURATION: 98 % | HEIGHT: 66 IN | HEART RATE: 59 BPM | BODY MASS INDEX: 34.63 KG/M2

## 2022-09-20 DIAGNOSIS — I44.2 THIRD DEGREE HEART BLOCK BY ELECTROCARDIOGRAM (H): Primary | ICD-10-CM

## 2022-09-20 PROCEDURE — 99215 OFFICE O/P EST HI 40 MIN: CPT | Performed by: PHYSICIAN ASSISTANT

## 2022-09-20 NOTE — LETTER
9/20/2022    Chris Flower MD  [address] [place] [address]    RE: Fausto Farr       Dear Colleague,     I had the pleasure of seeing Fasuto Farr in the [facility].    Cardiology Clinic Progress Note    Fausto Farr MRN# [id number]   YOB: 1940 Age: 82 year old   Primary cardiologist: Dr. Santo / Dr. Vasquez ()         Assessment and Plan:     In summary, Fausto Farr presents today for a hospital follow up visit. He resently presented to the ER with complaint of a visual disturbance, and was found to have complete AV block, as well as a subacute left occipital infarct,  neuro felt in setting of subtherapeutic INR (INR 2.3 at the time).  He underwent [employer] MRI compatible dual-chamber pacemaker placement on 8/2/2022.  Device check on 8/12 showed a short run of NSVT (not new for him).  He was placed back on beta-blocker therapy after that.     Today, he appears to be doing very well, with a normally-healed pacemaker site and tolerating beta blocker therapy without issue.     Plan:  - Continue current medications.  - Reinforced importance of strict INR goal of 2.5-3.5. If INR falls below 2.5 at any time, needs to be bridged with Lovenox.   - Device check scheduled for 9/23.   - RTC in 3 months.         History of Presenting Illness:      Fausto Farr is a pleasant 82 year old patient who presents today for a hospital follow up visit.     The patient has a history of the following -   1.  Complex history of arrhythmias.  He has a remote history of atrial flutter with successful ablation, as well as PAF, frequent PVCs and NSVT.  He also has a history of a right bundle branch block/left anterior hemiblock/first-degree AV block, with progression to intermittent second-degree Mobitz 2 while on beta-blocker therapy, therefore previously off.  Despite this, he presented to the ER with complaint of a visual disturbance in August of  "this year, and was found to have complete AV block.  He underwent [employer] MRI compatible dual-chamber pacemaker placement on 8/2/2022.  Device check on 8/12 showed 7% a paced, 88% V paced, 1 run of NSVT (14 beats, rate 185).  He was placed back on beta-blocker therapy after that.  2.  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  3. CAD, status CABG and PCI.  4.  Valvular heart disease, status post mechanical MVR/AVR. On warfarin with goal INR 2.5-3.5.  5.  AAA, status post endovascular repair with a small residual leak, as well as mesenteric artery narrowing, and carotid artery disease.  Followed by Dr. Christensen.  6.  Chronic venous insufficiency, status post VenaSeal closure in the past with Dr. Whitman.  7. GAGE, on CPAP.    In brief, I am meeting this patient for the first time in clinic, for evaluation after beta-blocker was added for nonsustained VT (known problem for him) on device check, as noted above.  He is scheduled for a follow-up device check in 3 days.    Today, Ralph presents to clinic with his wife. He tells me he feels great - much better energy. In hindsight was feeling low energy and his wife also noted he was very forgetful beginning this year. Both of those things have improved. Additionally, he's had no further visual abnormalities. Patient denies chest pain, shortness of breath, PND, orthopnea, claudication, palpitations, near syncope or syncope. Has varicose veins with mild chronic venous stasis changes but it doesn't bother him. On warfarin with therapeutic INR. Lipid panel under good control. Renal fn normal.          Review of Systems:     12-pt ROS is negative except for as noted in the HPI.          Physical Exam:     Vitals: /52   Pulse 59   Ht 1.664 m (5' 5.5\")   Wt 97.8 kg (215 lb 8 oz)   SpO2 98%   BMI 35.32 kg/m    Wt Readings from Last 10 Encounters:   09/20/22 97.8 kg (215 lb 8 oz)   08/08/22 98.8 kg " (217 lb 12.8 oz)   08/03/22 98.2 kg (216 lb 6.4 oz)   08/01/22 98.4 kg (217 lb)   01/13/22 101.2 kg (223 lb)   07/26/21 98.7 kg (217 lb 11.2 oz)   04/19/21 101.6 kg (224 lb)   12/01/20 97.6 kg (215 lb 3.2 oz)   11/02/20 96.6 kg (213 lb)   10/19/20 98.9 kg (218 lb)       Constitutional:  Patient is pleasant, alert, cooperative, and in NAD.  HEENT:  NCAT. PERRLA. EOM's intact.   Neck:  CVP appears normal. No carotid bruits.   Pulmonary: Normal respiratory effort. CTAB.   Cardiac: RRR, normal S1/S2, no S3/S4, no murmur or rub. L chest pacemaker site healed, closed, without edema, erythema, tenderness.  Abdomen:  Non-tender abdomen, no hepatosplenomegaly appreciated.   Vascular: Pulses in the upper and lower extremities are 2+ and equal bilaterally.  Extremities: No edema, erythema, cyanosis or tenderness appreciated.  Skin:  No rashes or lesions appreciated.   Neurological:  No gross motor or sensory deficits.   Psych: Appropriate affect.        Data:     Labs reviewed:  Recent Labs   Lab Test 07/27/22  0822 07/26/21  1025 06/01/20  0941 11/13/19  0804 07/12/18  0747 05/08/18  1043 03/21/16  1425 10/28/15  1428   LDL 64 73 90  --    < > 52   < >  --    HDL 34* 38* 34*  --    < > 32*   < >  --    NHDL 89 99 118  --    < > 87   < >  --    CHOL 123 137 152  --    < > 119   < >  --    TRIG 124 131 138  --    < > 176*   < >  --    TSH  --   --   --  0.54  --  0.96  --   --    ANDREW  --   --   --   --   --   --   --  183    < > = values in this interval not displayed.       Lab Results   Component Value Date    WBC 7.2 08/03/2022    WBC 7.6 06/15/2020    RBC 4.46 08/03/2022    RBC 4.44 06/15/2020    HGB 13.0 (L) 08/03/2022    HGB 13.0 (L) 06/15/2020    HCT 40.3 08/03/2022    HCT 40.0 06/15/2020    MCV 90 08/03/2022    MCV 90 06/15/2020    MCH 29.1 08/03/2022    MCH 29.3 06/15/2020    MCHC 32.3 08/03/2022    MCHC 32.5 06/15/2020    RDW 12.4 08/03/2022    RDW 13.3 06/15/2020     08/03/2022     06/15/2020       Lab  Results   Component Value Date     08/03/2022     06/01/2020    POTASSIUM 4.0 08/03/2022    POTASSIUM 4.0 06/01/2020    CHLORIDE 107 08/03/2022    CHLORIDE 106 06/01/2020    CO2 27 08/03/2022    CO2 26 06/01/2020    ANIONGAP 6 08/03/2022    ANIONGAP 4 06/01/2020     (H) 08/03/2022     (H) 08/03/2022     (H) 06/01/2020    BUN 15 08/03/2022    BUN 13 06/01/2020    CR 0.91 08/03/2022    CR 0.73 06/01/2020    GFRESTIMATED 85 08/03/2022    GFRESTIMATED >60 10/11/2021    GFRESTIMATED 72 10/06/2020    GFRESTBLACK 87 10/06/2020    AWILDA 8.8 08/03/2022    AWILDA 8.5 06/01/2020      Lab Results   Component Value Date    AST 27 07/27/2022    AST 19 06/01/2020    ALT 28 07/27/2022    ALT 16 06/01/2020       Lab Results   Component Value Date    A1C 6.5 (H) 07/27/2022    A1C 5.9 04/25/2017       Lab Results   Component Value Date    INR 2.8 (H) 09/14/2022    INR 3.5 (H) 09/01/2022    INR 3.01 (H) 08/08/2022    INR 1.74 (H) 08/03/2022    INR 2.00 (H) 07/08/2021    INR 1.80 (H) 06/17/2021           Problem List:     Patient Active Problem List   Diagnosis     Rotator cuff (capsule) sprain     Somatic dysfunction of pelvic region     Contact dermatitis and other eczema, due to unspecified cause     Ulcerative colitis (H)     Atrial fibrillation (H)     Other specified anemias     Gastric ulcer     Bilateral low back pain with left-sided sciatica     Nonallopathic lesion of lumbar region     Nonallopathic lesion of sacral region     Diaphragmatic hernia     Abdominal pain, epigastric     Malaise and fatigue     Nocturia     Nonallopathic lesion of cervical region     Nonallopathic lesion of thoracic region     Malignant neoplasm of prostate (H)     Abdominal aortic aneurysm (H)     Nonallopathic lesion of rib cage     Vitamin D deficiency disease     Anemia relative at 12.5 in 8-13      Essential hypertension, benign     Hyperlipidemia LDL goal <100     Prostate cancer (H)     Glucose intolerance  (impaired glucose tolerance)     Sciatica of left side since 2000     Valvular heart disease     Heart murmur     MRSA (methicillin resistant Staphylococcus aureus)     Health Care Home     Urinary retention     Coronary artery disease     ACP (advance care planning)     AF (atrial fibrillation) (H)     S/P aortic valve replacement     S/P CABG (coronary artery bypass graft)     Stented coronary artery     Mixed hyperlipidemia     PVD (peripheral vascular disease) (H)     Abnormal ECG     Personal history of tobacco use, presenting hazards to health     Spinal stenosis of lumbar region without neurogenic claudication     S/P mitral valve replacement     RBBB with left anterior fascicular block     S/P lumbar spinal fusion     Long term current use of anticoagulant therapy     Chronic systolic congestive heart failure (H)     GAGE (obstructive sleep apnea)     Sepsis due to group B Streptococcus (H)     Typical atrial flutter (H)     Obesity (BMI 35.0-39.9) with comorbidity (H)     Knee pain, chronic     Total knee replacement status     Open wound of right knee     Wound dehiscence     Persistent atrial fibrillation (H)     Diabetes mellitus, type 2 (H)     TIA (transient ischemic attack)     Bradycardia     Third degree heart block s/p pacemaker           Medications:     Current Outpatient Medications   Medication Sig Dispense Refill     acetaminophen (TYLENOL) 500 MG tablet Take 500-1,000 mg by mouth every 6 hours as needed for pain       betamethasone valerate (VALISONE) 0.1 % external lotion Apply topically 2 times daily Apply topically to scalp twice daily PRN 60 mL 3     cholecalciferol 25 MCG (1000 UT) TABS Take 1,000 Units by mouth daily       ezetimibe (ZETIA) 10 MG tablet Take 1 tablet (10 mg) by mouth daily 90 tablet 3     ferrous sulfate (FEROSUL) 325 (65 Fe) MG tablet Take 325 mg by mouth daily (with breakfast)       fluocinonide (LIDEX) 0.05 % external ointment Apply topically 2 times daily (Patient  taking differently: Apply topically 2 times daily as needed) 60 g 11     furosemide (LASIX) 40 MG tablet Take 0.5 tablets (20 mg) by mouth daily 45 tablet 3     glucosamine-chondroitin 500-400 MG CAPS per capsule Take 1 capsule by mouth every evening       mesalamine (ASACOL HD) 800 MG EC tablet Take 1 tablet (800 mg) by mouth 2 times daily 180 tablet 3     metoprolol succinate ER (TOPROL XL) 50 MG 24 hr tablet Take 1 tablet (50 mg) by mouth daily 90 tablet 3     rosuvastatin (CRESTOR) 40 MG tablet Take 1 tablet (40 mg) by mouth daily 90 tablet 3     tamsulosin (FLOMAX) 0.4 MG capsule Take 1 capsule (0.4 mg) by mouth daily 90 capsule 3     triamcinolone (KENALOG) 0.1 % external cream Apply topically 2 times daily (Patient taking differently: Apply topically 2 times daily as needed) 85.2 g 1     triamcinolone (KENALOG) 0.1 % external lotion Apply topically 2 times daily (Patient taking differently: Apply topically 2 times daily as needed) 60 mL 1     warfarin ANTICOAGULANT (COUMADIN) 5 MG tablet Take 5mg every Sun & Wed / Take 7.5mg all other days OR per INR clinic 120 tablet 1           Past Medical History:     Past Medical History:   Diagnosis Date     Abdominal pain      Anemia     currently taking iron     Back pain     since 1980     BPH (benign prostatic hyperplasia)      Bruit      CAD (coronary artery disease)      Cellulitis 10/18/2012     Cellulitis 05/2018    GrpB strep LLE cellulitis  negative RACHAEL for veg     Chronic venous insufficiency     bilat lower extremities     Contact dermatitis and other eczema, due to unspecified cause      Diaphragmatic hernia without mention of obstruction or gangrene      Diastolic HF (heart failure) (H)      Gastric ulcer      Hypertension 08/06/2013     Lumbago      Mesenteric artery insufficiency (H)     known AAA and narrowing of intestinal artery's  followed by dr raymond     Metabolic syndrome      Mitral valve disease     s/p mechanical MVR, prior [facility] AVR      Nonallopathic lesion of lumbar region      Nonallopathic lesion of rib cage      Nonallopathic lesion of sacral region      GAGE (obstructive sleep apnea)     CPAP     Paroxysmal atrial fibrillation (H) 10/18/2012    occured after stopping BB  (prior  aflutter ablation)     Prostate cancer (H) 2008    radiation seed, XRT      PVD (peripheral vascular disease) (H)      Rotator cuff strain     and sprain     S/P AAA repair      S/P aortic valve replacement 2006    developed perivalve leak and MS, therefore redo surg 2013     S/P CABG (coronary artery bypass graft) 2006    Lima-Lad, RA-Rca     Sciatica of left side     since 2000     Sepsis due to group B Streptococcus (H) 05/19/2018     Ulcerative colitis (H)      Varicose veins of bilateral lower extremities with other complications     s/p RLE vein stripping     Vitamin D deficiency      Past Surgical History:   Procedure Laterality Date     AORTIC VALVE REPLACEMENT  1/3/06    redo AVR SJM 21mm and SJM MVR 27mm in 2013SJM 21([id number]):AVR, SJM 27 :MVR-     APPLY WOUND VAC N/A 11/12/2019    Procedure: APPLICATION, WOUND VAC;  Surgeon: Sara Martinez MD;  Location:  OR     ARTHROPLASTY KNEE      right knee     ARTHROPLASTY KNEE Right 7/22/2019    Procedure: Right total knee arthroplasty;  Surgeon: Prasanth Jensen MD;  Location:  OR     BACK SURGERY  Oct 2015    Fusion L4-5, laminectomy L2, L3     BYPASS GRAFT ARTERY CORONARY  10/2013    reimplantation of radial artery graft to RCA     CARDIAC CATHERIZATION  11/2005    Stent placed to RCA     CARDIAC CATHERIZATION  04/2013    Occluded RCA, patent LIMA to LAD and radial graft to PDA     CARPAL TUNNEL RELEASE RT/LT  1994     COLONOSCOPY  8-22-11     CYSTOSCOPY FLEXIBLE  10/16/2013    Procedure: CYSTOSCOPY FLEXIBLE;  FLEXIBLE CYSTOSCOPY / DILATION OF URETHRA / INSERTION OF LESLIE;  Surgeon: Cooper Wallace MD;  Location:  OR     ENDOVASCULAR REPAIR, INFRARENAL ABDOMINAL AORTIC  ANEURYSM/DISSECTION; MODULAR BIFURCATED PROSTHESIS  2006    AAA repair endovascular     ENT SURGERY       EP ABLATION ATRIAL FLUTTER N/A 4/22/2019    Procedure: EP Ablation Atrial Flutter;  Surgeon: Jessy Vasquez MD;  Location:  HEART CARDIAC CATH LAB     EP PACEMAKER DEVICE & LEAD IMPLANT- RIGHT ATRIAL & RIGHT VENTRICULAR N/A 8/2/2022    Procedure: Pacemaker Device & Lead Implant - Right Atrial & Right Ventricular;  Surgeon: Jessy Vasquez MD;  Location:  HEART CARDIAC CATH LAB     GENITOURINARY SURGERY  6/16/08    radioactive seeding     GRAFT FLAP PEDICLE EXTREMITY (LOCATION) Right 11/12/2019    Procedure: SURGICAL PROCUREMENT, FLAP, PEDICLE, EXTREMITY;  Surgeon: Sara Martinez MD;  Location:  OR     GRAFT SKIN SPLIT THICKNESS FROM EXTREMITY Right 11/12/2019    Procedure: RIGHT GASTRONEMIUS FLAP TO RIGHT KNEE, SPLIT THICKNESS SKIN GRAFT FROM RIGHT THIGH TO RIGHT KNEE, SURGICAL PROCUREMENT, FLAP, PEDICLE, EXTREMITY, WOUND VAC PLACEMENT;  Surgeon: Sara Martinez MD;  Location:  OR     HEAD & NECK SURGERY  1997    vocal cord polypectomy     INCISION AND DRAINAGE KNEE, COMBINED Right 8/29/2019    Procedure: INCISION AND DRAINAGE, KNEE W/ Secondary Wound Closure;  Surgeon: Prasanth Jensen MD;  Location:  OR     IRRIGATION AND DEBRIDEMENT KNEE, PLACE ANTIBIOTIC CEMENT BEADS / SPACE Right 2/12/2020    Procedure: 1. Irrigation and debridement of wound breakdown, right total knee arthroplasty.  2. Irrigation and debridement of right total knee with placement of antibiotic-impregnated (vancomycin) absorbable antibiotic beads.;  Surgeon: Prasanth Jensen MD;  Location: [facility] OR     IRRIGATION AND DEBRIDEMENT LOWER EXTREMITY, COMBINED Right 12/8/2019    Procedure: DEBRIDEMENT OF RIGHT CALF AND WOUND CLOSURE.;  Surgeon: Sara Martinez MD;  Location:  OR     KNEE SURGERY  2001 Right knee arthroscopy     OPTICAL TRACKING SYSTEM FUSION SPINE POSTERIOR LUMBAR  THREE+ LEVELS N/A 10/29/2015    Procedure: OPTICAL TRACKING SYSTEM FUSION SPINE POSTERIOR LUMBAR THREE+ LEVELS;  Surgeon: Walt Garcia MD;  Location: [facility] OR     PROSTATE SURGERY      radioactive seeding 08     REPAIR ANEURYSM ABDOMINAL AORTA       REPAIR VALVE MITRAL  10/16/2013    SJM 21([id number] [id number]):AVR, SJM 27 :MVRProcedure: REPAIR VALVE MITRAL;  REDO STERNOTOMY/REDO AORTIC VALVE REPLACEMENT/ MITRAL VALVE REPLACEMENT/REIMPLANTATION OF RIGHT CORONARY ARTERY BYPASS WITH RACHAEL ( ON PUMP);  Surgeon: Viet Sinhg MD;  Location:  OR     REPLACE VALVE AORTIC  10/16/2013    Procedure: REPLACE VALVE AORTIC;;  Surgeon: Viet Singh MD;  Location: [facility] OR     SURGERY GENERAL IP CONSULT  2008 Excision aneurysm abdominal aorta     SURGERY GENERAL IP CONSULT   Vocal cord polypectomy     VASCULAR SURGERY  1993     varicose vein stripping     ZZC CABG, VEIN, TWO  1/3/06    Left radial to RCA, LIMA to LAD (RA to RCA reimplanted at time of 2013 surg)     Family History   Problem Relation Age of Onset     No Known Problems Mother      Coronary Artery Disease Father         CABG     Heart Disease Father         Pacemaker     No Known Problems Brother      No Known Problems Sister      No Known Problems Son      Other Cancer Daughter      No Known Problems Daughter      Heart Disease Brother      Other - See Comments Grandchild      Social History     Socioeconomic History     Marital status:      Spouse name: Not on file     Number of children: Not on file     Years of education: Not on file     Highest education level: Not on file   Occupational History     Not on file   Tobacco Use     Smoking status: Former Smoker     Packs/day: 1.00     Years: 40.00     Pack years: 40.00     Start date: 4/15/1962     Quit date: 10/23/2002     Years since quittin.9     Smokeless tobacco: Never Used   Vaping Use     Vaping Use: Never used   Substance and Sexual Activity     Alcohol use:  Yes     Comment: a couple beers per week (socially)     Drug use: No     Sexual activity: Not Currently     Partners: Female   Other Topics Concern     Parent/sibling w/ CABG, MI or angioplasty before 65F 55M? Yes     Comment: Brother had bypass at 55      Service Not Asked     Blood Transfusions Not Asked     Caffeine Concern No     Comment: 6-8 cups of half and half per day     Occupational Exposure Not Asked     Hobby Hazards Not Asked     Sleep Concern Not Asked     Stress Concern Not Asked     Weight Concern Not Asked     Special Diet No     Back Care Not Asked     Exercise No     Bike Helmet Not Asked     Seat Belt Not Asked     Self-Exams Not Asked   Social History Narrative     Not on file     Social Determinants of Health     Financial Resource Strain: Not on file   Food Insecurity: Not on file   Transportation Needs: Not on file   Physical Activity: Not on file   Stress: Not on file   Social Connections: Not on file   Intimate Partner Violence: Not on file   Housing Stability: Not on file           Allergies:   MILAGROS Aguilera [facility]  Pager: [phone]    Today's clinic visit entailed:  Review of the result(s) of each unique test - pacemaker check, MRI brain  I spent a total of 60 minutes on the day of the visit.   Time spent doing chart review, history and exam, documentation and further activities per the note  Provider  Link to [facility] Help Grid     The level of medical decision making during this visit was of high complexity.    Thank you for allowing me to participate in the care of your patient.      Sincerely,     MILAGROS [facility]  cc:   No referring provider defined for this encounter.

## 2022-09-20 NOTE — PATIENT INSTRUCTIONS
Today's Plan:   - Please return to see Dr. Santo in 3 months. Call sooner with any concerns.     If you have questions or concerns please call my nurse team at [phone].   Scheduling phone number: [phone]  For after hours urgent concerns call [phone] option 2.   Reminder: Please bring in all current medications, over the counter supplements and vitamin bottles to your next appointment.    It was a pleasure seeing you today!     Paige Busch PA-C

## 2022-09-20 NOTE — PROGRESS NOTES
Cardiology Clinic Progress Note    Fausto Farr MRN# [id number]   YOB: 1940 Age: 82 year old   Primary cardiologist: Dr. Santo / Dr. Vasquez ()         Assessment and Plan:     In summary, Fausto Farr presents today for a hospital follow up visit. He resently presented to the ER with complaint of a visual disturbance, and was found to have complete AV block, as well as a subacute left occipital infarct,  neuro felt in setting of subtherapeutic INR (INR 2.3 at the time).  He underwent [employer] MRI compatible dual-chamber pacemaker placement on 8/2/2022.  Device check on 8/12 showed a short run of NSVT (not new for him).  He was placed back on beta-blocker therapy after that.     Today, he appears to be doing very well, with a normally-healed pacemaker site and tolerating beta blocker therapy without issue.     Plan:  - Continue current medications.  - Reinforced importance of strict INR goal of 2.5-3.5. If INR falls below 2.5 at any time, needs to be bridged with Lovenox. I communicated this to the anticoagulation clinic.  - Device check scheduled for 9/23.   - RTC in 3 months.         History of Presenting Illness:      Fausto Farr is a pleasant 82 year old patient who presents today for a hospital follow up visit.     The patient has a history of the following -   1.  Complex history of arrhythmias.  He has a remote history of atrial flutter with successful ablation, as well as PAF, frequent PVCs and NSVT.  He also has a history of a right bundle branch block/left anterior hemiblock/first-degree AV block, with progression to intermittent second-degree Mobitz 2 while on beta-blocker therapy, therefore previously off.  Despite this, he presented to the ER with complaint of a visual disturbance in August of this year, and was found to have complete AV block.  He underwent [employer] MRI compatible dual-chamber pacemaker placement on 8/2/2022.  Device check on 8/12  "showed 7% a paced, 88% V paced, 1 run of NSVT (14 beats, rate 185).  He was placed back on beta-blocker therapy after that.  2.  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  3. CAD, status CABG and PCI.  4.  Valvular heart disease, status post mechanical MVR/AVR. On warfarin with goal INR 2.5-3.5.  5.  AAA, status post endovascular repair with a small residual leak, as well as mesenteric artery narrowing, and carotid artery disease.  Followed by Dr. Christensen.  6.  Chronic venous insufficiency, status post VenaSeal closure in the past with Dr. Whitman.  7. GAGE, on CPAP.    In brief, I am meeting this patient for the first time in clinic, for evaluation after beta-blocker was added for nonsustained VT (known problem for him) on device check, as noted above.  He is scheduled for a follow-up device check in 3 days.    Today, Ralph presents to clinic with his wife. He tells me he feels great - much better energy. In hindsight was feeling low energy and his wife also noted he was very forgetful beginning this year. Both of those things have improved. Additionally, he's had no further visual abnormalities. Patient denies chest pain, shortness of breath, PND, orthopnea, claudication, palpitations, near syncope or syncope. Has varicose veins with mild chronic venous stasis changes but it doesn't bother him. On warfarin with therapeutic INR. Lipid panel under good control. Renal fn normal.          Review of Systems:     12-pt ROS is negative except for as noted in the HPI.          Physical Exam:     Vitals: /52   Pulse 59   Ht 1.664 m (5' 5.5\")   Wt 97.8 kg (215 lb 8 oz)   SpO2 98%   BMI 35.32 kg/m    Wt Readings from Last 10 Encounters:   09/20/22 97.8 kg (215 lb 8 oz)   08/08/22 98.8 kg (217 lb 12.8 oz)   08/03/22 98.2 kg (216 lb 6.4 oz)   08/01/22 98.4 kg (217 lb)   01/13/22 101.2 kg (223 lb)   07/26/21 98.7 kg (217 lb 11.2 oz)   04/19/21 101.6 kg (224 " lb)   12/01/20 97.6 kg (215 lb 3.2 oz)   11/02/20 96.6 kg (213 lb)   10/19/20 98.9 kg (218 lb)       Constitutional:  Patient is pleasant, alert, cooperative, and in NAD.  HEENT:  NCAT. PERRLA. EOM's intact.   Neck:  CVP appears normal. No carotid bruits.   Pulmonary: Normal respiratory effort. CTAB.   Cardiac: RRR, normal S1/S2, no S3/S4, no murmur or rub. L chest pacemaker site healed, closed, without edema, erythema, tenderness.  Abdomen:  Non-tender abdomen, no hepatosplenomegaly appreciated.   Vascular: Pulses in the upper and lower extremities are 2+ and equal bilaterally.  Extremities: No edema, erythema, cyanosis or tenderness appreciated.  Skin:  No rashes or lesions appreciated.   Neurological:  No gross motor or sensory deficits.   Psych: Appropriate affect.        Data:     Labs reviewed:  Recent Labs   Lab Test 07/27/22  0822 07/26/21  1025 06/01/20  0941 11/13/19  0804 07/12/18  0747 05/08/18  1043 03/21/16  1425 10/28/15  1428   LDL 64 73 90  --    < > 52   < >  --    HDL 34* 38* 34*  --    < > 32*   < >  --    NHDL 89 99 118  --    < > 87   < >  --    CHOL 123 137 152  --    < > 119   < >  --    TRIG 124 131 138  --    < > 176*   < >  --    TSH  --   --   --  0.54  --  0.96  --   --    ANDREW  --   --   --   --   --   --   --  183    < > = values in this interval not displayed.       Lab Results   Component Value Date    WBC 7.2 08/03/2022    WBC 7.6 06/15/2020    RBC 4.46 08/03/2022    RBC 4.44 06/15/2020    HGB 13.0 (L) 08/03/2022    HGB 13.0 (L) 06/15/2020    HCT 40.3 08/03/2022    HCT 40.0 06/15/2020    MCV 90 08/03/2022    MCV 90 06/15/2020    MCH 29.1 08/03/2022    MCH 29.3 06/15/2020    MCHC 32.3 08/03/2022    MCHC 32.5 06/15/2020    RDW 12.4 08/03/2022    RDW 13.3 06/15/2020     08/03/2022     06/15/2020       Lab Results   Component Value Date     08/03/2022     06/01/2020    POTASSIUM 4.0 08/03/2022    POTASSIUM 4.0 06/01/2020    CHLORIDE 107 08/03/2022    CHLORIDE  106 06/01/2020    CO2 27 08/03/2022    CO2 26 06/01/2020    ANIONGAP 6 08/03/2022    ANIONGAP 4 06/01/2020     (H) 08/03/2022     (H) 08/03/2022     (H) 06/01/2020    BUN 15 08/03/2022    BUN 13 06/01/2020    CR 0.91 08/03/2022    CR 0.73 06/01/2020    GFRESTIMATED 85 08/03/2022    GFRESTIMATED >60 10/11/2021    GFRESTIMATED 72 10/06/2020    GFRESTBLACK 87 10/06/2020    AWILDA 8.8 08/03/2022    AWILDA 8.5 06/01/2020      Lab Results   Component Value Date    AST 27 07/27/2022    AST 19 06/01/2020    ALT 28 07/27/2022    ALT 16 06/01/2020       Lab Results   Component Value Date    A1C 6.5 (H) 07/27/2022    A1C 5.9 04/25/2017       Lab Results   Component Value Date    INR 2.8 (H) 09/14/2022    INR 3.5 (H) 09/01/2022    INR 3.01 (H) 08/08/2022    INR 1.74 (H) 08/03/2022    INR 2.00 (H) 07/08/2021    INR 1.80 (H) 06/17/2021           Problem List:     Patient Active Problem List   Diagnosis     Rotator cuff (capsule) sprain     Somatic dysfunction of pelvic region     Contact dermatitis and other eczema, due to unspecified cause     Ulcerative colitis (H)     Atrial fibrillation (H)     Other specified anemias     Gastric ulcer     Bilateral low back pain with left-sided sciatica     Nonallopathic lesion of lumbar region     Nonallopathic lesion of sacral region     Diaphragmatic hernia     Abdominal pain, epigastric     Malaise and fatigue     Nocturia     Nonallopathic lesion of cervical region     Nonallopathic lesion of thoracic region     Malignant neoplasm of prostate (H)     Abdominal aortic aneurysm (H)     Nonallopathic lesion of rib cage     Vitamin D deficiency disease     Anemia relative at 12.5 in 8-13      Essential hypertension, benign     Hyperlipidemia LDL goal <100     Prostate cancer (H)     Glucose intolerance (impaired glucose tolerance)     Sciatica of left side since 2000     Valvular heart disease     Heart murmur     MRSA (methicillin resistant Staphylococcus aureus)     Health  Care Home     Urinary retention     Coronary artery disease     ACP (advance care planning)     AF (atrial fibrillation) (H)     S/P aortic valve replacement     S/P CABG (coronary artery bypass graft)     Stented coronary artery     Mixed hyperlipidemia     PVD (peripheral vascular disease) (H)     Abnormal ECG     Personal history of tobacco use, presenting hazards to health     Spinal stenosis of lumbar region without neurogenic claudication     S/P mitral valve replacement     RBBB with left anterior fascicular block     S/P lumbar spinal fusion     Long term current use of anticoagulant therapy     Chronic systolic congestive heart failure (H)     GAGE (obstructive sleep apnea)     Sepsis due to group B Streptococcus (H)     Typical atrial flutter (H)     Obesity (BMI 35.0-39.9) with comorbidity (H)     Knee pain, chronic     Total knee replacement status     Open wound of right knee     Wound dehiscence     Persistent atrial fibrillation (H)     Diabetes mellitus, type 2 (H)     TIA (transient ischemic attack)     Bradycardia     Third degree heart block s/p pacemaker           Medications:     Current Outpatient Medications   Medication Sig Dispense Refill     acetaminophen (TYLENOL) 500 MG tablet Take 500-1,000 mg by mouth every 6 hours as needed for pain       betamethasone valerate (VALISONE) 0.1 % external lotion Apply topically 2 times daily Apply topically to scalp twice daily PRN 60 mL 3     cholecalciferol 25 MCG (1000 UT) TABS Take 1,000 Units by mouth daily       ezetimibe (ZETIA) 10 MG tablet Take 1 tablet (10 mg) by mouth daily 90 tablet 3     ferrous sulfate (FEROSUL) 325 (65 Fe) MG tablet Take 325 mg by mouth daily (with breakfast)       fluocinonide (LIDEX) 0.05 % external ointment Apply topically 2 times daily (Patient taking differently: Apply topically 2 times daily as needed) 60 g 11     furosemide (LASIX) 40 MG tablet Take 0.5 tablets (20 mg) by mouth daily 45 tablet 3      glucosamine-chondroitin 500-400 MG CAPS per capsule Take 1 capsule by mouth every evening       mesalamine (ASACOL HD) 800 MG EC tablet Take 1 tablet (800 mg) by mouth 2 times daily 180 tablet 3     metoprolol succinate ER (TOPROL XL) 50 MG 24 hr tablet Take 1 tablet (50 mg) by mouth daily 90 tablet 3     rosuvastatin (CRESTOR) 40 MG tablet Take 1 tablet (40 mg) by mouth daily 90 tablet 3     tamsulosin (FLOMAX) 0.4 MG capsule Take 1 capsule (0.4 mg) by mouth daily 90 capsule 3     triamcinolone (KENALOG) 0.1 % external cream Apply topically 2 times daily (Patient taking differently: Apply topically 2 times daily as needed) 85.2 g 1     triamcinolone (KENALOG) 0.1 % external lotion Apply topically 2 times daily (Patient taking differently: Apply topically 2 times daily as needed) 60 mL 1     warfarin ANTICOAGULANT (COUMADIN) 5 MG tablet Take 5mg every Sun & Wed / Take 7.5mg all other days OR per INR clinic 120 tablet 1           Past Medical History:     Past Medical History:   Diagnosis Date     Abdominal pain      Anemia     currently taking iron     Back pain     since 1980     BPH (benign prostatic hyperplasia)      Bruit      CAD (coronary artery disease)      Cellulitis 10/18/2012     Cellulitis 05/2018    GrpB strep LLE cellulitis  negative RACHAEL for veg     Chronic venous insufficiency     bilat lower extremities     Contact dermatitis and other eczema, due to unspecified cause      Diaphragmatic hernia without mention of obstruction or gangrene      Diastolic HF (heart failure) (H)      Gastric ulcer      Hypertension 08/06/2013     Lumbago      Mesenteric artery insufficiency (H)     known AAA and narrowing of intestinal artery's  followed by dr raymond     Metabolic syndrome      Mitral valve disease     s/p mechanical MVR, prior mech AVR     Nonallopathic lesion of lumbar region      Nonallopathic lesion of rib cage      Nonallopathic lesion of sacral region      GAGE (obstructive sleep apnea)     CPAP      Paroxysmal atrial fibrillation (H) 10/18/2012    occured after stopping BB  (prior  aflutter ablation)     Prostate cancer (H) 2008    radiation seed, XRT      PVD (peripheral vascular disease) (H)      Rotator cuff strain     and sprain     S/P AAA repair      S/P aortic valve replacement 2006    developed perivalve leak and MS, therefore redo surg 2013     S/P CABG (coronary artery bypass graft) 2006    Lima-Lad, RA-Rca     Sciatica of left side     since 2000     Sepsis due to group B Streptococcus (H) 05/19/2018     Ulcerative colitis (H)      Varicose veins of bilateral lower extremities with other complications     s/p RLE vein stripping     Vitamin D deficiency      Past Surgical History:   Procedure Laterality Date     AORTIC VALVE REPLACEMENT  1/3/06    redo AVR SJM 21mm and SJM MVR 27mm in 2013SJM 21([id number]):AVR, SJM 27 :MVR-     APPLY WOUND VAC N/A 11/12/2019    Procedure: APPLICATION, WOUND VAC;  Surgeon: Sara Martinez MD;  Location:  OR     ARTHROPLASTY KNEE      right knee     ARTHROPLASTY KNEE Right 7/22/2019    Procedure: Right total knee arthroplasty;  Surgeon: Prasanth Jensen MD;  Location:  OR     BACK SURGERY  Oct 2015    Fusion L4-5, laminectomy L2, L3     BYPASS GRAFT ARTERY CORONARY  10/2013    reimplantation of radial artery graft to RCA     CARDIAC CATHERIZATION  11/2005    Stent placed to RCA     CARDIAC CATHERIZATION  04/2013    Occluded RCA, patent LIMA to LAD and radial graft to PDA     CARPAL TUNNEL RELEASE RT/LT  1994     COLONOSCOPY  8-22-11     CYSTOSCOPY FLEXIBLE  10/16/2013    Procedure: CYSTOSCOPY FLEXIBLE;  FLEXIBLE CYSTOSCOPY / DILATION OF URETHRA / INSERTION OF LESLIE;  Surgeon: Cooper Wallace MD;  Location:  OR     ENDOVASCULAR REPAIR, INFRARENAL ABDOMINAL AORTIC ANEURYSM/DISSECTION; MODULAR BIFURCATED PROSTHESIS  2006    AAA repair endovascular     ENT SURGERY       EP ABLATION ATRIAL FLUTTER N/A 4/22/2019    Procedure: EP Ablation Atrial  Flutter;  Surgeon: Jessy Vasquez MD;  Location:  HEART CARDIAC CATH LAB     EP PACEMAKER DEVICE & LEAD IMPLANT- RIGHT ATRIAL & RIGHT VENTRICULAR N/A 8/2/2022    Procedure: Pacemaker Device & Lead Implant - Right Atrial & Right Ventricular;  Surgeon: Jessy Vasquez MD;  Location:  HEART CARDIAC CATH LAB     GENITOURINARY SURGERY  6/16/08    radioactive seeding     GRAFT FLAP PEDICLE EXTREMITY (LOCATION) Right 11/12/2019    Procedure: SURGICAL PROCUREMENT, FLAP, PEDICLE, EXTREMITY;  Surgeon: Sara Martinez MD;  Location:  OR     GRAFT SKIN SPLIT THICKNESS FROM EXTREMITY Right 11/12/2019    Procedure: RIGHT GASTRONEMIUS FLAP TO RIGHT KNEE, SPLIT THICKNESS SKIN GRAFT FROM RIGHT THIGH TO RIGHT KNEE, SURGICAL PROCUREMENT, FLAP, PEDICLE, EXTREMITY, WOUND VAC PLACEMENT;  Surgeon: Sara Martinez MD;  Location:  OR     HEAD & NECK SURGERY  1997    vocal cord polypectomy     INCISION AND DRAINAGE KNEE, COMBINED Right 8/29/2019    Procedure: INCISION AND DRAINAGE, KNEE W/ Secondary Wound Closure;  Surgeon: Prasanth Jensen MD;  Location:  OR     IRRIGATION AND DEBRIDEMENT KNEE, PLACE ANTIBIOTIC CEMENT BEADS / SPACE Right 2/12/2020    Procedure: 1. Irrigation and debridement of wound breakdown, right total knee arthroplasty.  2. Irrigation and debridement of right total knee with placement of antibiotic-impregnated (vancomycin) absorbable antibiotic beads.;  Surgeon: Prasanth Jensen MD;  Location: [facility] OR     IRRIGATION AND DEBRIDEMENT LOWER EXTREMITY, COMBINED Right 12/8/2019    Procedure: DEBRIDEMENT OF RIGHT CALF AND WOUND CLOSURE.;  Surgeon: Sara Martinez MD;  Location:  OR     KNEE SURGERY  2001 Right knee arthroscopy     OPTICAL TRACKING SYSTEM FUSION SPINE POSTERIOR LUMBAR THREE+ LEVELS N/A 10/29/2015    Procedure: OPTICAL TRACKING SYSTEM FUSION SPINE POSTERIOR LUMBAR THREE+ LEVELS;  Surgeon: Walt Garcia MD;  Location:  OR     PROSTATE SURGERY       radioactive seeding 08     REPAIR ANEURYSM ABDOMINAL AORTA       REPAIR VALVE MITRAL  10/16/2013    SJM 21([id number] [id number]):AVR, SJM 27 :MVRProcedure: REPAIR VALVE MITRAL;  REDO STERNOTOMY/REDO AORTIC VALVE REPLACEMENT/ MITRAL VALVE REPLACEMENT/REIMPLANTATION OF RIGHT CORONARY ARTERY BYPASS WITH RACHAEL ( ON PUMP);  Surgeon: Viet Singh MD;  Location:  OR     REPLACE VALVE AORTIC  10/16/2013    Procedure: REPLACE VALVE AORTIC;;  Surgeon: Viet Singh MD;  Location: [facility] OR     SURGERY GENERAL IP CONSULT  2008 Excision aneurysm abdominal aorta     SURGERY GENERAL IP CONSULT   Vocal cord polypectomy     VASCULAR SURGERY  ,      varicose vein stripping     ZZC CABG, VEIN, TWO  1/3/06    Left radial to RCA, LIMA to LAD (RA to RCA reimplanted at time of 2013 surg)     Family History   Problem Relation Age of Onset     No Known Problems Mother      Coronary Artery Disease Father         CABG     Heart Disease Father         Pacemaker     No Known Problems Brother      No Known Problems Sister      No Known Problems Son      Other Cancer Daughter      No Known Problems Daughter      Heart Disease Brother      Other - See Comments Grandchild      Social History     Socioeconomic History     Marital status:      Spouse name: Not on file     Number of children: Not on file     Years of education: Not on file     Highest education level: Not on file   Occupational History     Not on file   Tobacco Use     Smoking status: Former Smoker     Packs/day: 1.00     Years: 40.00     Pack years: 40.00     Start date: 4/15/1962     Quit date: 10/23/2002     Years since quittin.9     Smokeless tobacco: Never Used   Vaping Use     Vaping Use: Never used   Substance and Sexual Activity     Alcohol use: Yes     Comment: a couple beers per week (socially)     Drug use: No     Sexual activity: Not Currently     Partners: Female   Other Topics Concern     Parent/sibling w/ CABG, MI or  angioplasty before 65F 55M? Yes     Comment: Brother had bypass at 55      Service Not Asked     Blood Transfusions Not Asked     Caffeine Concern No     Comment: 6-8 cups of half and half per day     Occupational Exposure Not Asked     Hobby Hazards Not Asked     Sleep Concern Not Asked     Stress Concern Not Asked     Weight Concern Not Asked     Special Diet No     Back Care Not Asked     Exercise No     Bike Helmet Not Asked     Seat Belt Not Asked     Self-Exams Not Asked   Social History Narrative     Not on file     Social Determinants of Health     Financial Resource Strain: Not on file   Food Insecurity: Not on file   Transportation Needs: Not on file   Physical Activity: Not on file   Stress: Not on file   Social Connections: Not on file   Intimate Partner Violence: Not on file   Housing Stability: Not on file           Allergies:   Juany Busch PA-C  [facility] - Heart Clinic  Pager: [phone]    Today's clinic visit entailed:  Review of the result(s) of each unique test - pacemaker check, MRI brain  I spent a total of 60 minutes on the day of the visit.   Time spent doing chart review, history and exam, documentation and further activities per the note  Provider  Link to MDM Help Grid     The level of medical decision making during this visit was of high complexity.

## 2022-09-23 ENCOUNTER — TELEPHONE (OUTPATIENT)
Dept: CARDIOLOGY | Facility: CLINIC | Age: 82
End: 2022-09-23

## 2022-09-23 ENCOUNTER — ANCILLARY PROCEDURE (OUTPATIENT)
Dept: CARDIOLOGY | Facility: CLINIC | Age: 82
End: 2022-09-23
Attending: INTERNAL MEDICINE
Payer: MEDICARE

## 2022-09-23 DIAGNOSIS — I44.2 COMPLETE ATRIOVENTRICULAR BLOCK (H): ICD-10-CM

## 2022-09-23 DIAGNOSIS — Z95.0 CARDIAC PACEMAKER IN SITU: ICD-10-CM

## 2022-09-23 DIAGNOSIS — I47.29 NSVT (NONSUSTAINED VENTRICULAR TACHYCARDIA) (H): ICD-10-CM

## 2022-09-23 LAB
MDC_IDC_LEAD_IMPLANT_DT: NORMAL
MDC_IDC_LEAD_IMPLANT_DT: NORMAL
MDC_IDC_LEAD_LOCATION: NORMAL
MDC_IDC_LEAD_LOCATION: NORMAL
MDC_IDC_LEAD_LOCATION_DETAIL_1: NORMAL
MDC_IDC_LEAD_LOCATION_DETAIL_1: NORMAL
MDC_IDC_LEAD_MFG: NORMAL
MDC_IDC_LEAD_MFG: NORMAL
MDC_IDC_LEAD_MODEL: NORMAL
MDC_IDC_LEAD_MODEL: NORMAL
MDC_IDC_LEAD_POLARITY_TYPE: NORMAL
MDC_IDC_LEAD_POLARITY_TYPE: NORMAL
MDC_IDC_LEAD_SERIAL: NORMAL
MDC_IDC_LEAD_SERIAL: NORMAL
MDC_IDC_MSMT_BATTERY_STATUS: NORMAL
MDC_IDC_MSMT_LEADCHNL_RA_IMPEDANCE_VALUE: 711 OHM
MDC_IDC_MSMT_LEADCHNL_RA_PACING_THRESHOLD_AMPLITUDE: 0.8 V
MDC_IDC_MSMT_LEADCHNL_RA_PACING_THRESHOLD_PULSEWIDTH: 0.4 MS
MDC_IDC_MSMT_LEADCHNL_RA_SENSING_INTR_AMPL: 4 MV
MDC_IDC_MSMT_LEADCHNL_RV_IMPEDANCE_VALUE: 765 OHM
MDC_IDC_MSMT_LEADCHNL_RV_PACING_THRESHOLD_AMPLITUDE: 0.8 V
MDC_IDC_MSMT_LEADCHNL_RV_PACING_THRESHOLD_PULSEWIDTH: 0.4 MS
MDC_IDC_MSMT_LEADCHNL_RV_SENSING_INTR_AMPL: 25 MV
MDC_IDC_PG_IMPLANT_DTM: NORMAL
MDC_IDC_PG_MFG: NORMAL
MDC_IDC_PG_MODEL: NORMAL
MDC_IDC_PG_SERIAL: NORMAL
MDC_IDC_PG_TYPE: NORMAL
MDC_IDC_SESS_CLINIC_NAME: NORMAL
MDC_IDC_SESS_DTM: NORMAL
MDC_IDC_SESS_TYPE: NORMAL
MDC_IDC_SET_BRADY_AT_MODE_SWITCH_MODE: NORMAL
MDC_IDC_SET_BRADY_AT_MODE_SWITCH_RATE: 170 {BEATS}/MIN
MDC_IDC_SET_BRADY_HYSTRATE: NORMAL
MDC_IDC_SET_BRADY_LOWRATE: 60 {BEATS}/MIN
MDC_IDC_SET_BRADY_MAX_SENSOR_RATE: 130 {BEATS}/MIN
MDC_IDC_SET_BRADY_MAX_TRACKING_RATE: 130 {BEATS}/MIN
MDC_IDC_SET_BRADY_MODE: NORMAL
MDC_IDC_SET_BRADY_PAV_DELAY_HIGH: 200 MS
MDC_IDC_SET_BRADY_PAV_DELAY_LOW: 250 MS
MDC_IDC_SET_BRADY_SAV_DELAY_HIGH: 200 MS
MDC_IDC_SET_BRADY_SAV_DELAY_LOW: 250 MS
MDC_IDC_SET_LEADCHNL_RA_PACING_AMPLITUDE: 2 V
MDC_IDC_SET_LEADCHNL_RA_PACING_CAPTURE_MODE: NORMAL
MDC_IDC_SET_LEADCHNL_RA_PACING_POLARITY: NORMAL
MDC_IDC_SET_LEADCHNL_RA_PACING_PULSEWIDTH: 0.4 MS
MDC_IDC_SET_LEADCHNL_RA_SENSING_ADAPTATION_MODE: NORMAL
MDC_IDC_SET_LEADCHNL_RA_SENSING_POLARITY: NORMAL
MDC_IDC_SET_LEADCHNL_RA_SENSING_SENSITIVITY: 0.25 MV
MDC_IDC_SET_LEADCHNL_RV_PACING_AMPLITUDE: 1.4 V
MDC_IDC_SET_LEADCHNL_RV_PACING_CAPTURE_MODE: NORMAL
MDC_IDC_SET_LEADCHNL_RV_PACING_POLARITY: NORMAL
MDC_IDC_SET_LEADCHNL_RV_PACING_PULSEWIDTH: 0.4 MS
MDC_IDC_SET_LEADCHNL_RV_SENSING_ADAPTATION_MODE: NORMAL
MDC_IDC_SET_LEADCHNL_RV_SENSING_POLARITY: NORMAL
MDC_IDC_SET_LEADCHNL_RV_SENSING_SENSITIVITY: 0.6 MV
MDC_IDC_SET_ZONE_DETECTION_INTERVAL: 375 MS
MDC_IDC_SET_ZONE_TYPE: NORMAL
MDC_IDC_SET_ZONE_VENDOR_TYPE: NORMAL
MDC_IDC_STAT_EPISODE_RECENT_COUNT: 3
MDC_IDC_STAT_EPISODE_RECENT_COUNT_DTM_END: NORMAL
MDC_IDC_STAT_EPISODE_RECENT_COUNT_DTM_START: NORMAL
MDC_IDC_STAT_EPISODE_TOTAL_COUNT: 4
MDC_IDC_STAT_EPISODE_TOTAL_COUNT_DTM_END: NORMAL
MDC_IDC_STAT_EPISODE_TYPE: NORMAL
MDC_IDC_STAT_EPISODE_TYPE: NORMAL
MDC_IDC_STAT_EPISODE_VENDOR_TYPE: NORMAL
MDC_IDC_STAT_EPISODE_VENDOR_TYPE: NORMAL

## 2022-09-23 PROCEDURE — 93280 PM DEVICE PROGR EVAL DUAL: CPT | Performed by: INTERNAL MEDICINE

## 2022-09-23 NOTE — LETTER
September 29, 2022       TO: Fausto Farr  [address]  [place] [address]       Dear Fausto Farr,    We are trying to reach you to adjust your medication. The device team has alerted us that your heart rate still has some short runs of NSVT (non-sustained ventricular tachycardia). AURELIA Paige Narayan would like to increase your metoprolol dose slightly to 75mg daily. Please contact the Team 2 RNs to discuss this change plan at [phone].      Thank you,  Team 2 RNs  [phone]  [facility]

## 2022-09-23 NOTE — TELEPHONE ENCOUNTER
Paige Busch requesting update following pacemaker interrogation. Will route information below.  ______________________________________________    [employer]ade (D) 6-8 week Post Pacemaker Device Check  Patient seen in clinic for device evaluation and iterative programming.   AP: 16%  : 90%  Mode: DDD . (Explained to patient what rate response is and to keep an eye on how he feels with activity. Will call the clinic if he ever feels he wants to turn this feature on).  Underlying Rhythm: SR 60-70s with CHB and JE in the 30-40s with PVCs  Heart Rate: stable with adequate variability; HR mainly 60-100s per histogram  Sensing: WNL  Pacing Threshold: WNL  Impedance: WNL  Battery Status: estimated 14 years remaining  Incision: well healed  Atrial Arrhythmia: none  Ventricular Arrhythmia: 4 NSVT episodes logged. 2 EGMs available show V>A for 12-26 beats with V rates in the 170-200s for NSVT. Not new for patient. Recently started on toprol XL 50mg daily. EF 55-60% 8/2022.  Setting Change: none    Care Plan: remote device check scheduled for 12/23/22. Will update Paige Busch with results per her request. Due to follow up with Dr. Santo on 12/21/22. Patient has not yet received his remote monitor (confirmed one was ordered). Pt will call clinic in November if still not received and we will set him up for an in clinic check. Educated on incision monitoring, activity resumption, remote monitor function and use, and when to call the clinic.    JUSTIN Soni    I have reviewed and interpreted the device interrogation, settings, programming and nurse's summary. The device is functioning within normal device parameters. I agree with the current findings, assessment and plan.  ______________________________________________    NSVT Example:

## 2022-09-26 ENCOUNTER — TELEPHONE (OUTPATIENT)
Dept: CARDIOLOGY | Facility: CLINIC | Age: 82
End: 2022-09-26

## 2022-09-26 NOTE — TELEPHONE ENCOUNTER
Attempted to call Patient. Message left to return call.  Mobile # does not accept messages.     Device check order placed. Currently scheduled for device check 12-23-22.  Scheduling messaged to please move up.    Dr. Santo OV 12-21-22.    Checked with Eliza ANDERSON. Few  appointments available  through the end of the year.    Also Insurances may not cover device checks scheduled sooner than every 91 days.

## 2022-09-26 NOTE — TELEPHONE ENCOUNTER
Per device RN, runs occurred both before and after beta blocker initiation. Let's go ahead and increase his dose of Toprol from 50 to 75 mg daily. Please schedule his next device check to be prior to visit with Dr. Santo (currently scheduled after). Thanks!

## 2022-09-29 RX ORDER — METOPROLOL SUCCINATE 50 MG/1
TABLET, EXTENDED RELEASE ORAL
Qty: 135 TABLET | Refills: 1 | Status: SHIPPED | OUTPATIENT
Start: 2022-09-29 | End: 2023-05-10

## 2022-09-29 NOTE — TELEPHONE ENCOUNTER
Reviewed message from patient. Rx escripted for toprol XL 75mg (1 1/2 tab daily) to [facility] [employer] [place]. Letter not sent as patient was able to call back today.

## 2022-09-29 NOTE — TELEPHONE ENCOUNTER
[facility] Call Center    Phone Message    May a detailed message be left on voicemail: yes     Reason for Call: Other: Ralph eliza Lillian's call.  He was informed of the medication increase from 50 mg to 75 mg as recommended.  He is currently out of town until [place] 10/2 and will start the increase then.  He is requesting that a Rx be sent to his pharmacy with the updated directions increasing the medication so he does not run out.  Please call him back to discuss other questions he has.  He is fine waiting until next week.  Thank you!     Action Taken: Message routed to:  Other: Cardiology    Travel Screening: Not Applicable

## 2022-09-29 NOTE — TELEPHONE ENCOUNTER
Attempted to contact patient on home phone, left a voice mail. Cell phone does not have active VM set up. patient does not use My chart.    Mailed patient a letter requesting that he call us to discuss the medication change as we cannot reach him by phone.

## 2022-10-03 ENCOUNTER — TELEPHONE (OUTPATIENT)
Dept: CARDIOLOGY | Facility: CLINIC | Age: 82
End: 2022-10-03

## 2022-10-03 NOTE — TELEPHONE ENCOUNTER
Message from patient asking about the messages on his answering machine. He just returned from vacation.  Attempted to contact patient, left a message reviewing last week's conversation that his metoprolol should be increased to 75mg (1 1/2 tab) daily and that a new script was sent to his pharmacy.  Left the Team 2 RN phone # for further questions or concerns.    1015 spoke with patient, he wanted to confirm the reason for the medication increase. Reviewed the device check finding of NSVT. Patient verbalized understanding and agreed with plan.

## 2022-10-05 ENCOUNTER — LAB (OUTPATIENT)
Dept: LAB | Facility: CLINIC | Age: 82
End: 2022-10-05
Payer: MEDICARE

## 2022-10-05 ENCOUNTER — ANTICOAGULATION THERAPY VISIT (OUTPATIENT)
Dept: ANTICOAGULATION | Facility: CLINIC | Age: 82
End: 2022-10-05

## 2022-10-05 DIAGNOSIS — Z79.01 LONG TERM CURRENT USE OF ANTICOAGULANT THERAPY: ICD-10-CM

## 2022-10-05 DIAGNOSIS — I48.19 PERSISTENT ATRIAL FIBRILLATION (H): ICD-10-CM

## 2022-10-05 DIAGNOSIS — Z95.2 S/P MITRAL VALVE REPLACEMENT: ICD-10-CM

## 2022-10-05 DIAGNOSIS — Z95.2 S/P AORTIC VALVE REPLACEMENT: Primary | ICD-10-CM

## 2022-10-05 DIAGNOSIS — Z95.2 S/P AORTIC VALVE REPLACEMENT: ICD-10-CM

## 2022-10-05 LAB — INR BLD: 3.4 (ref 0.9–1.1)

## 2022-10-05 PROCEDURE — 85610 PROTHROMBIN TIME: CPT

## 2022-10-05 PROCEDURE — 36416 COLLJ CAPILLARY BLOOD SPEC: CPT

## 2022-10-05 NOTE — PROGRESS NOTES
ANTICOAGULATION MANAGEMENT     Fausto Farr 82 year old male is on warfarin with therapeutic INR result. (Goal INR 2.5-3.5)    Recent labs: (last 7 days)     10/05/22  0841   INR 3.4*       ASSESSMENT       Source(s): Chart Review and Patient/Caregiver Call       Warfarin doses taken: Warfarin taken as instructed    Diet: No new diet changes identified    New illness, injury, or hospitalization: No    Medication/supplement changes: Metoprolol dose increased on 9/23/22 No interaction anticipated    Signs or symptoms of bleeding or clotting: No    Previous INR: Therapeutic last 2(+) visits    Additional findings: None       PLAN     Recommended plan for no diet, medication or health factor changes affecting INR     Dosing Instructions: Continue your current warfarin dose with next INR in 4 weeks       Summary  As of 10/5/2022    Full warfarin instructions:  5 mg every Sun, Wed; 7.5 mg all other days   Next INR check:  11/2/2022             Telephone call with Ralph who verbalizes understanding and agrees to plan    Lab visit scheduled    Education provided: Please call back if any changes to your diet, medications or how you've been taking warfarin and Contact [phone]  with any changes, questions or concerns.     Plan made per [facility] anticoagulation protocol    Cece Rios RN  Anticoagulation Clinic  10/5/2022    _______________________________________________________________________     Anticoagulation Episode Summary     Current INR goal:  2.5-3.5   TTR:  74.3 % (1 y)   Target end date:  Indefinite   Send INR reminders to:  SHANNA DOWELL    Indications    S/P aortic valve replacement [Z95.2]  S/P mitral valve replacement [Z95.2]  Long term current use of anticoagulant therapy [Z79.01]  Persistent atrial fibrillation (H) [I48.19]           Comments:  Per 9/20/22 Cardio Note strict INR goal of 2.5-3.5. If INR falls below 2.5 at any time, needs to be bridged with Lovenox.         Anticoagulation Care  Providers     Provider Role Specialty Phone number    Jodi Flower Mai, MD Referring Internal Medicine - Pediatrics [phone]

## 2022-10-12 LAB
CREAT BLD-MCNC: 1 MG/DL (ref 0.7–1.3)
GFR SERPL CREATININE-BSD FRML MDRD: >60 ML/MIN/1.73M2

## 2022-10-12 PROCEDURE — 250N000011 HC RX IP 250 OP 636: Performed by: RADIOLOGY

## 2022-10-12 PROCEDURE — G1004 CDSM NDSC: HCPCS

## 2022-10-12 PROCEDURE — 250N000009 HC RX 250: Performed by: RADIOLOGY

## 2022-10-12 PROCEDURE — 82565 ASSAY OF CREATININE: CPT

## 2022-10-12 RX ORDER — IOPAMIDOL 755 MG/ML
500 INJECTION, SOLUTION INTRAVASCULAR ONCE
Status: COMPLETED | OUTPATIENT
Start: 2022-10-12 | End: 2022-10-12

## 2022-10-12 RX ADMIN — IOPAMIDOL 80 ML: 755 INJECTION, SOLUTION INTRAVENOUS at 08:40

## 2022-10-12 RX ADMIN — SODIUM CHLORIDE 80 ML: 9 INJECTION, SOLUTION INTRAVENOUS at 08:40

## 2022-10-13 ENCOUNTER — TELEPHONE (OUTPATIENT)
Dept: OTHER | Facility: CLINIC | Age: 82
End: 2022-10-13

## 2022-10-13 ENCOUNTER — HOSPITAL ENCOUNTER (OUTPATIENT)
Dept: CT IMAGING | Facility: CLINIC | Age: 82
Discharge: HOME | End: 2022-10-12
Attending: RADIOLOGY | Admitting: RADIOLOGY
Payer: MEDICARE

## 2022-10-13 DIAGNOSIS — I71.40 ABDOMINAL AORTIC ANEURYSM (AAA) WITHOUT RUPTURE (H): ICD-10-CM

## 2022-10-13 DIAGNOSIS — I71.40 ABDOMINAL AORTIC ANEURYSM (AAA) WITHOUT RUPTURE (H): Primary | ICD-10-CM

## 2022-10-13 PROCEDURE — G1004 CDSM NDSC: HCPCS

## 2022-10-13 RX ORDER — IOPAMIDOL 755 MG/ML
500 INJECTION, SOLUTION INTRAVASCULAR ONCE
Status: COMPLETED | OUTPATIENT
Start: 2022-10-13 | End: 2022-10-13

## 2022-10-13 RX ADMIN — IOPAMIDOL 80 ML: 755 INJECTION, SOLUTION INTRAVENOUS at 10:06

## 2022-10-13 RX ADMIN — SODIUM CHLORIDE 80 ML: 9 INJECTION, SOLUTION INTRAVENOUS at 10:04

## 2022-10-13 NOTE — RESULT ENCOUNTER NOTE
Contacted patient with results of CTA abdomen/pelvis.  Stable aneurysm sac size.  Yesy Rao RN  IR nurse clinician  [phone]

## 2022-10-16 ENCOUNTER — HEALTH MAINTENANCE LETTER (OUTPATIENT)
Age: 82
End: 2022-10-16

## 2022-10-17 PROBLEM — S81.001A OPEN WOUND OF RIGHT KNEE: Status: RESOLVED | Noted: 2019-11-10 | Resolved: 2022-10-17

## 2022-10-17 PROBLEM — Z86.73 HISTORY OF STROKE: Status: ACTIVE | Noted: 2022-10-17

## 2022-10-17 PROBLEM — Z96.659 TOTAL KNEE REPLACEMENT STATUS: Status: RESOLVED | Noted: 2019-07-22 | Resolved: 2022-10-17

## 2022-10-17 PROBLEM — T81.30XA WOUND DEHISCENCE: Status: RESOLVED | Noted: 2020-02-12 | Resolved: 2022-10-17

## 2022-10-17 PROBLEM — G45.9 TIA (TRANSIENT ISCHEMIC ATTACK): Status: RESOLVED | Noted: 2022-08-01 | Resolved: 2022-10-17

## 2022-10-17 PROBLEM — A40.1 SEPSIS DUE TO GROUP B STREPTOCOCCUS (H): Status: RESOLVED | Noted: 2018-05-19 | Resolved: 2022-10-17

## 2022-10-18 ENCOUNTER — OFFICE VISIT (OUTPATIENT)
Dept: NEUROLOGY | Facility: CLINIC | Age: 82
End: 2022-10-18
Attending: NURSE PRACTITIONER
Payer: MEDICARE

## 2022-10-18 VITALS
WEIGHT: 218.2 LBS | SYSTOLIC BLOOD PRESSURE: 133 MMHG | HEART RATE: 66 BPM | BODY MASS INDEX: 36.35 KG/M2 | HEIGHT: 65 IN | DIASTOLIC BLOOD PRESSURE: 58 MMHG

## 2022-10-18 DIAGNOSIS — Z86.73 HISTORY OF STROKE: ICD-10-CM

## 2022-10-18 PROCEDURE — 99204 OFFICE O/P NEW MOD 45 MIN: CPT | Performed by: PSYCHIATRY & NEUROLOGY

## 2022-10-18 NOTE — LETTER
10/18/2022         RE: Fausto Farr  [address] [place] [address]        Dear Colleague,    Thank you for referring your patient, Fausto Farr, to the [facility] [employer]. Please see a copy of my visit note below.    NEUROLOGY CONSULTATION NOTE       [facility] [employer]  [address] [place] [address]  Tel: [phone]  Fax: [phone]  [email or web address]     Fausto Farr,  1940, MRN [id number]  PCP: Jodi Flower Mai  Date: 10/18/2022     ASSESSMENT & PLAN     Visit Diagnosis  1. History of stroke     History of left occipital infarction  82-year-old male with history of CAD s/p CABG, AAA s/p repair, mechanical AVR and MVR, A. fib, DM, HTN, HLD who was admitted to the hospital in 2022 for intermittent dizziness and visual symptoms.  MRI scan showed left occipital infarct.  CTA showed stenosis of the left vertebral artery, right ICA filling defect and extensive atherosclerotic plaque involving the carotid siphon.  He has made complete recovery and at present I have recommended:    1.  Continue Coumadin with goal of INR between 2.5-3.5.  2.  Continue Crestor with goal of LDL less than 70  3.  Continue physical therapy and home exercises  4.  Vascular risk factors modification: Healthy diet (fruits, vegetables, low fat dairy & reduced saturated fat), weight loss, exercise at least 30 minutes 5 days/week, BMI goal <25.  Keep systolic blood pressure goal <130.  LDL goal <70.  Hemoglobin A1c goal <7. If applicable, STOP smoking  5.  Follow-up will be in as needed basis    Thank you again for this referral, please feel free to contact me if you have any questions.    Isma English MD  [facility]  (Formerly, [facility]     REASON FOR CONSULTATION Stroke        HISTORY OF PRESENT ILLNESS     We have been requested by Dr. Flower to evaluate Fausto Farr who is a  82 year old  male for CVA    Patient is a 82-year-old male with history of CAD s/p CABG, AAA s/p repair, mechanical AVR and MVR, paroxysmal A. fib, GAGE, DM, HTN, HLD who was admitted to the hospital in August 2022 for intermittent lightheadedness and visual changes.  His symptoms were transient and resolved by the time he was discharged.  He had CT of the head that did not show acute changes.  CTA head and neck showed stenosis of the left vertebral artery, right ICA filling defect and extensive atherosclerotic plaque involving the carotid siphons.  MRI brain confirmed left occipital infarct.  Cardiology was consulted and they felt his ischemia was related to atrial fibrillation and a subtherapeutic INR.  He was told to continue on Coumadin, Crestor and Zetia.  He had echocardiogram during that hospitalization that showed an ejection fraction of 50 to 60% with mechanical mitral valve prosthesis and mechanical aortic valve prosthesis.  There was no myocardial effusion.  Since his discharge from the hospital he reports no further episodes of diplopia or any focal weakness.  He had lipid profile checked and his LDL was 69     PROBLEM LIST   Patient Active Problem List   Diagnosis Code     Ulcerative colitis (H) K51.90     Atrial fibrillation (H) I48.91     Gastric ulcer K25.9     Bilateral low back pain with left-sided sciatica M54.42     Diaphragmatic hernia K44.9     Nocturia R35.1     Malignant neoplasm of prostate (H) C61     Abdominal aortic aneurysm I71.40     Vitamin D deficiency disease E55.9     Anemia relative at 12.5 in 8-13  D64.9     Essential hypertension, benign I10     Hyperlipidemia LDL goal <100 E78.5     Prostate cancer (H) C61     Glucose intolerance (impaired glucose tolerance) R73.02     Sciatica of left side since 2000 M54.32     Valvular heart disease I38     Heart murmur R01.1     Health Care Home Z76.89     Coronary artery disease I25.10     ACP (advance care planning) Z71.89     S/P aortic  valve replacement Z95.2     S/P CABG (coronary artery bypass graft) Z95.1     Stented coronary artery Z95.5     Mixed hyperlipidemia E78.2     PVD (peripheral vascular disease) (H) I73.9     Personal history of tobacco use, presenting hazards to health Z87.891     Spinal stenosis of lumbar region without neurogenic claudication M48.061     S/P mitral valve replacement Z95.2     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     S/P lumbar spinal fusion Z98.1     Long term current use of anticoagulant therapy Z79.01     Chronic systolic congestive heart failure (H) I50.22     GAGE (obstructive sleep apnea) G47.33     Obesity (BMI 35.0-39.9) with comorbidity (H) E66.01     Knee pain, chronic M25.569, G89.29     Diabetes mellitus, type 2 (H) E11.9     History of left occipital stroke Z86.73         PAST MEDICAL & SURGICAL HISTORY     Past Medical History:   Patient  has a past medical history of Abdominal pain, Anemia, Back pain, BPH (benign prostatic hyperplasia), Bruit, CAD (coronary artery disease), Cellulitis (10/18/2012), Cellulitis (05/2018), Chronic venous insufficiency, Contact dermatitis and other eczema, due to unspecified cause, Diaphragmatic hernia without mention of obstruction or gangrene, Diastolic HF (heart failure) (H), Gastric ulcer, Hypertension (08/06/2013), Lumbago, Mesenteric artery insufficiency (H), Metabolic syndrome, Mitral valve disease, Nonallopathic lesion of lumbar region, Nonallopathic lesion of rib cage, Nonallopathic lesion of sacral region, GAGE (obstructive sleep apnea), Paroxysmal atrial fibrillation (H) (10/18/2012), Prostate cancer (H) (2008), PVD (peripheral vascular disease) (H), Rotator cuff strain, S/P AAA repair, S/P aortic valve replacement (2006), S/P CABG (coronary artery bypass graft) (2006), Sciatica of left side, Sepsis due to group B Streptococcus (H) (05/19/2018), TIA (transient ischemic attack) (8/1/2022), Total knee replacement status (7/22/2019), Ulcerative colitis  (H), Varicose veins of bilateral lower extremities with other complications, and Vitamin D deficiency.    Surgical History:  He  has a past surgical history that includes knee surgery (2001 Right knee arthroscopy); Surgery General IP Consult (05/2008 Excision aneurysm abdominal aorta); Surgery General IP Consult (1997 Vocal cord polypectomy); endovascular repair, infrarenal abdominal aortic aneurysm/dissection; modular bifurcated prosthesis (2006); vascular surgery (1968, 1993 ); colonoscopy (8-22-11); ENT surgery; Head and neck surgery (1997); Abdomen surgery (6/16/08); Prostate surgery; Repair aneurysm abdominal aorta (06/08); Cystoscopy flexible (10/16/2013); cardiac catherization (11/2005); cardiac catherization (04/2013); Optical tracking system fusion spine posterior lumbar three+ levels (N/A, 10/29/2015); carpal tunnel release rt/lt (1994); Arthroplasty knee; back surgery (Oct 2015); Ablation Atrial Flutter (N/A, 4/22/2019); Arthroplasty knee (Right, 7/22/2019); CABG, VEIN, TWO (1/3/06); aortic valve replacement (1/3/06); Repair valve mitral (10/16/2013); Replace valve aortic (10/16/2013); Bypass graft artery coronary (10/2013); Incision and drainage knee, combined (Right, 8/29/2019); Graft skin split thickness from extremity (Right, 11/12/2019); Apply Wound Vac (N/A, 11/12/2019); Graft flap pedicle extremity (location) (Right, 11/12/2019); Irrigation and debridement lower extremity, combined (Right, 12/8/2019); Irrigation And Debridement Knee, Place A (Right, 2/12/2020); and Pacemaker Device & Lead Implant - Right Atrial & Right Ventricular (N/A, 8/2/2022).     SOCIAL HISTORY     Reviewed, and he  reports that he quit smoking about 20 years ago. His smoking use included cigarettes. He started smoking about 60 years ago. He has a 40.00 pack-year smoking history. He has never used smokeless tobacco. He reports current alcohol use. He reports that he does not use drugs.     FAMILY HISTORY     Reviewed, and  family history includes Coronary Artery Disease in his father; Heart Disease in his brother and father; No Known Problems in his brother, daughter, mother, sister, and son; Other - See Comments in his grandchild; Other Cancer in his daughter.     ALLERGIES     Allergies   Allergen Reactions     Bees Anaphylaxis         REVIEW OF SYSTEMS     A 12 point review of system was performed and was negative except as outlined in the history of present illness.     HOME MEDICATIONS     Current Outpatient Rx   Medication Sig Dispense Refill     acetaminophen (TYLENOL) 500 MG tablet Take 500-1,000 mg by mouth every 6 hours as needed for pain       amoxicillin-clavulanate (AUGMENTIN) 875-125 MG tablet Take 1 tablet by mouth daily       betamethasone valerate (VALISONE) 0.1 % external lotion Apply topically 2 times daily Apply topically to scalp twice daily PRN 60 mL 3     cholecalciferol 25 MCG (1000 UT) TABS Take 1,000 Units by mouth daily       ezetimibe (ZETIA) 10 MG tablet Take 1 tablet (10 mg) by mouth daily 90 tablet 3     ferrous sulfate (FEROSUL) 325 (65 Fe) MG tablet Take 325 mg by mouth daily (with breakfast)       fluocinonide (LIDEX) 0.05 % external ointment Apply topically 2 times daily (Patient taking differently: Apply topically 2 times daily as needed) 60 g 11     glucosamine-chondroitin 500-400 MG CAPS per capsule Take 1 capsule by mouth every evening       mesalamine (ASACOL HD) 800 MG EC tablet Take 1 tablet (800 mg) by mouth 2 times daily 180 tablet 3     metoprolol succinate ER (TOPROL XL) 50 MG 24 hr tablet Take 1 1/2 tab (75mg) daily 135 tablet 1     rosuvastatin (CRESTOR) 40 MG tablet Take 1 tablet (40 mg) by mouth daily 90 tablet 3     tamsulosin (FLOMAX) 0.4 MG capsule Take 1 capsule (0.4 mg) by mouth daily 90 capsule 3     triamcinolone (KENALOG) 0.1 % external lotion Apply topically 2 times daily (Patient taking differently: Apply topically 2 times daily as needed) 60 mL 1     warfarin ANTICOAGULANT  "(COUMADIN) 5 MG tablet Take 5mg every Sun & Wed / Take 7.5mg all other days OR per INR clinic 120 tablet 1     triamcinolone (KENALOG) 0.1 % external cream Apply topically 2 times daily (Patient taking differently: Apply topically 2 times daily as needed) 85.2 g 1         PHYSICAL EXAM     Vital signs  /58 (BP Location: Left arm)   Pulse 66   Ht 1.651 m (5' 5\")   Wt 99 kg (218 lb 3.2 oz)   BMI 36.31 kg/m      Weight:   218 lbs 3.2 oz    Middle-age gentleman who is alert and oriented vital signs are reviewed and are documented in electronic medical record.  Neck supple.  Neurologically speech was normal.  Cranial nerves II through XII are intact except he is hard of hearing.  Strength is 5/5 reflexes 2+ except absent in the right knee toes downgoing.  Sensation intact.  No dysmetria noted on finger-nose testing gait normal.     PERTINENT DIAGNOSTIC STUDIES     Following studies were reviewed:     CTA HEAD & NECK 8/1/2022  CTA Head:   1. Extensive atherosclerotic plaquing involving the carotid siphons.  2. Filling defect within the right internal carotid artery at the  cavernous segment which could represent exophytic plaque or  nonocclusive thrombus.   CTA Neck:   1. Moderate stenosis at the left vertebral artery origin.  2. Otherwise, no evidence of large vessel occlusion or high-grade  stenosis.   3. Incidental findings as detailed.    MRI BRAIN 8/2/2022  1. Punctate acute/subacute infarct in the paramedian left occipital  lobe. No definite findings of acute intracranial hemorrhage or  significant mass effect.  2. Several probable chronic parenchymal microhemorrhages primarily in  a superficial cortical/subcortical distribution.  3. Mild generalized brain parenchymal volume loss and presumed chronic  small vessel ischemic changes.    ECHOCARDIOGRAM 8/2/2022  There is moderate concentric left ventricular hypertrophy.  Left ventricular systolic function is normal.  The visual ejection fraction is " 55-60%.  The right ventricle is borderline dilated.  The right ventricular systolic function is normal.  Mild (35-45mmHg) pulmonary hypertension is present.  Mechanical steven valve prosthesis (21 mm St Solo). Well seated. Mean gradient  6 mmHg at HR 71 bpm. MR not appreciated due to acoustic shadowing.  Mechanical aortic valve prosthesis (27 mm St. Solo). Well seated with no  valvular or perivalvular AI. Mean gradient of 11 mmHg. Vmax 2.5 m/sec. AT <  100 msec. Normal Doppler interrogation for this valve.  The inferior vena cava was normal in size with preserved respiratory  variability.  There is no pericardial effusion.     Findings similar to study dated 7/2/2020. Gradients across both mechanical  prostheses are slightly increased compared to prior however still within  normal limits for these valve types.     PERTINENT LABS  Following labs were reviewed:  Hospital Outpatient Visit on 10/12/2022   Component Date Value     Creatinine POCT 10/12/2022 1.0      GFR, ESTIMATED POCT 10/12/2022 >60    Lab on 10/05/2022   Component Date Value     INR 10/05/2022 3.4 (H)    Ancillary Procedure on 09/23/2022   Component Date Value     Date Time Interrogation * 09/23/2022 20220923000000      Implantable Pulse Genera* 09/23/2022 [employer]      Implantable Pulse Genera* 09/23/2022 L331 ACCOLADE MRI EL      Implantable Pulse Genera* 09/23/2022 [id number]      Type Interrogation Sessi* 09/23/2022 In Clinic      Clinic Name 09/23/2022 [facility]      Implantable Pulse Genera* 09/23/2022 Pacemaker      Implantable Pulse Genera* 09/23/2022 [id number]      Implantable Lead Manufac* 09/23/2022 [employer]      Implantable Lead Model 09/23/2022 7840 Ingevity + MRI      Implantable Lead Serial * 09/23/2022 [id number]      Implantable Lead Implant* 09/23/2022 [id number]      Implantable Lead Polarit* 09/23/2022 Bipolar Lead      Implantable Lead Locatio* 09/23/2022 UNKNOWN      Implantable Lead Location 09/23/2022 Right Atrium       Implantable Lead Manufac* 09/23/2022 [employer]      Implantable Lead Model 09/23/2022 7841 Ingevity + MRI      Implantable Lead Serial * 09/23/2022 [id number]      Implantable Lead Implant* 09/23/2022 [id number]      Implantable Lead Polarit* 09/23/2022 Bipolar Lead      Implantable Lead Locatio* 09/23/2022 UNKNOWN      Implantable Lead Location 09/23/2022 Right Ventricle      Jalil Setting Mode (NBG * 09/23/2022 DDD      Jalil Setting Lower Rate* 09/23/2022 60      Jalil Setting Maximum Tr* 09/23/2022 130      Jalil Setting Maximum Se* 09/23/2022 130      Jalil Setting Hysterisis* 09/23/2022 Off      Jalil Setting RAGHAV Delay * 09/23/2022 250.0      Jalil Setting PAV Delay * 09/23/2022 250.0      Jalil Setting PAV Delay * 09/23/2022 200.0      Jalil Setting RAGHAV Delay * 09/23/2022 200.0      Jalil Setting AT Mode Sw* 09/23/2022 170      Jalil Setting AT Mode Sw* 09/23/2022 DDIR      Lead Channel Setting Sen* 09/23/2022 Bipolar      Lead Channel Setting Sen* 09/23/2022 0.25      Lead Channel Setting Sen* 09/23/2022 Adaptive      Lead Channel Setting Sen* 09/23/2022 Bipolar      Lead Channel Setting Sen* 09/23/2022 0.6      Lead Channel Setting Sen* 09/23/2022 Adaptive      Lead Channel Setting Pac* 09/23/2022 Bipolar      Lead Channel Setting Pac* 09/23/2022 0.4      Lead Channel Setting Pac* 09/23/2022 2.0      Lead Channel Setting Pac* 09/23/2022 Adaptive      Lead Channel Setting Pac* 09/23/2022 Bipolar      Lead Channel Setting Pac* 09/23/2022 0.4      Lead Channel Setting Pac* 09/23/2022 1.4      Lead Channel Setting Pac* 09/23/2022 Adaptive      Zone Setting Type Catego* 09/23/2022 VT      Zone Setting Vendor Type* 09/23/2022 VT      Zone Setting Detection I* 09/23/2022 375.0      Lead Channel Impedance V* 09/23/2022 711.0      Lead Channel Sensing Int* 09/23/2022 4.0      Lead Channel Pacing Thre* 09/23/2022 0.8000      Lead Channel Pacing Thre* 09/23/2022 0.4      Lead Channel Impedance V* 09/23/2022 765.0       Lead Channel Sensing Int* 09/23/2022 25      Lead Channel Pacing Thre* 09/23/2022 0.8000      Lead Channel Pacing Thre* 09/23/2022 0.4      Battery Status 09/23/2022 Beginning of Service      Episode Statistic Total * 09/23/2022 4      Episode Statistic Type C* 09/23/2022 VT      Episode Statistic Vendor* 09/23/2022 NSVT      Episode Statistic Total * 09/23/2022 20220923000000      Episode Statistic Recent* 09/23/2022 3      Episode Statistic Type C* 09/23/2022 VT      Episode Statistic Vendor* 09/23/2022 NSVT      Episode Statistic Recent* 09/23/2022 Fri Aug 12 05:01:04 CDT 2022      Episode Statistic Recent* 09/23/2022 [id number]    Lab on 09/14/2022   Component Date Value     INR 09/14/2022 2.8 (H)    Lab on 09/01/2022   Component Date Value     INR 09/01/2022 3.5 (H)    Lab on 08/23/2022   Component Date Value     INR 08/23/2022 4.6 (H)    Ancillary Procedure on 08/12/2022   Component Date Value     Date Time Interrogation * 08/12/2022 [id number]      Implantable Pulse Genera* 08/12/2022 [employer]      Implantable Pulse Genera* 08/12/2022 L331 ACCOLADE MRI EL      Implantable Pulse Genera* 08/12/2022 [id number]      Type Interrogation Sessi* 08/12/2022 In Clinic      Clinic Name 08/12/2022 [facility]      Implantable Pulse Genera* 08/12/2022 Pacemaker      Implantable Pulse Genera* 08/12/2022 [id number]      Implantable Lead Manufac* 08/12/2022 [employer]      Implantable Lead Model 08/12/2022 7840 Ingevity + MRI      Implantable Lead Serial * 08/12/2022 [id number]      Implantable Lead Implant* 08/12/2022 [id number]      Implantable Lead Polarit* 08/12/2022 Bipolar Lead      Implantable Lead Locatio* 08/12/2022 UNKNOWN      Implantable Lead Location 08/12/2022 Right Atrium      Implantable Lead Manufac* 08/12/2022 [employer]      Implantable Lead Model 08/12/2022 7841 Ingevity + MRI      Implantable Lead Serial * 08/12/2022 [id number]      Implantable Lead Implant* 08/12/2022 [id number]       Implantable Lead Polarit* 08/12/2022 Bipolar Lead      Implantable Lead Locatio* 08/12/2022 UNKNOWN      Implantable Lead Location 08/12/2022 Right Ventricle      Jalil Setting Mode (NBG * 08/12/2022 DDD      Jalil Setting Lower Rate* 08/12/2022 60      Jalil Setting Maximum Tr* 08/12/2022 130      Jalil Setting Maximum Se* 08/12/2022 130      Jalil Setting RAGHAV Delay * 08/12/2022 250      Jalil Setting PAV Delay * 08/12/2022 250      Jalil Setting PAV Delay * 08/12/2022 200      Jalil Setting RAGHAV Delay * 08/12/2022 200      Jalil Setting AT Mode Sw* 08/12/2022 170      Jalil Setting AT Mode Sw* 08/12/2022 DDIR      Lead Channel Setting Sen* 08/12/2022 Bipolar      Lead Channel Setting Sen* 08/12/2022 0.25      Lead Channel Setting Sen* 08/12/2022 Adaptive      Lead Channel Setting Sen* 08/12/2022 Bipolar      Lead Channel Setting Sen* 08/12/2022 0.6      Lead Channel Setting Sen* 08/12/2022 Adaptive      Lead Channel Setting Pac* 08/12/2022 Bipolar      Lead Channel Setting Pac* 08/12/2022 0.4      Lead Channel Setting Pac* 08/12/2022 2.0      Lead Channel Setting Pac* 08/12/2022 Adaptive      Lead Channel Setting Pac* 08/12/2022 Bipolar      Lead Channel Setting Pac* 08/12/2022 0.4      Lead Channel Setting Pac* 08/12/2022 1.2      Lead Channel Setting Pac* 08/12/2022 Adaptive      Zone Setting Type Catego* 08/12/2022 VT      Zone Setting Vendor Type* 08/12/2022 VT      Zone Setting Detection I* 08/12/2022 375      Lead Channel Impedance V* 08/12/2022 633      Lead Channel Pacing Thre* 08/12/2022 0.7      Lead Channel Pacing Thre* 08/12/2022 0.4      Lead Channel Impedance V* 08/12/2022 653      Lead Channel Pacing Thre* 08/12/2022 0.7      Lead Channel Pacing Thre* 08/12/2022 0.4      Battery Date Time of Dori* 08/12/2022 [id number]      Battery Status 08/12/2022 Beginning of Service      Battery Remaining Longev* 08/12/2022 168      Battery Remaining Percen* 08/12/2022 100      Jalil Statistic Date Tyron*  08/12/2022 [id number]      Jalil Statistic Date Tyron* 08/12/2022 48681657790740      Jalil Statistic RA Perce* 08/12/2022 7      Jalil Statistic RV Perce* 08/12/2022 88      Atrial Tachy Statistic D* 08/12/2022 20220804000000      Atrial Tachy Statistic D* 08/12/2022 20220812000000      Atrial Tachy Statistic A* 08/12/2022 0      Episode Statistic Recent* 08/12/2022 0      Episode Statistic Type C* 08/12/2022 AT/AF      Episode Statistic Vendor* 08/12/2022 AF      Episode Statistic Recent* 08/12/2022 0      Episode Statistic Type C* 08/12/2022 Other      Episode Statistic Recent* 08/12/2022 0      Episode Statistic Type C* 08/12/2022 SVT      Episode Statistic Vendor* 08/12/2022 SVT      Episode Statistic Recent* 08/12/2022 1      Episode Statistic Type C* 08/12/2022 VT      Episode Statistic Vendor* 08/12/2022 NSVT      Episode Statistic Recent* 08/12/2022 0      Episode Statistic Type C* 08/12/2022 VT      Episode Statistic Vendor* 08/12/2022 VT      Episode Statistic Recent* 08/12/2022 [id number]      Episode Statistic Recent* 08/12/2022 [id number]      Episode Statistic Recent* 08/12/2022 36603817581192      Episode Statistic Recent* 08/12/2022 73112548649505      Episode Statistic Recent* 08/12/2022 75582746334535      Episode Statistic Recent* 08/12/2022 54422491984480      Episode Statistic Recent* 08/12/2022 34828672449962      Episode Statistic Recent* 08/12/2022 50150811839435      Episode Statistic Recent* 08/12/2022 24451199175745      Episode Statistic Recent* 08/12/2022 [id number]    Lab on 08/08/2022   Component Date Value     INR 08/08/2022 3.01 (H)    Lab on 08/04/2022   Component Date Value     INR 08/04/2022 2.3 (H)    Admission on 08/01/2022, Discharged on 08/03/2022   Component Date Value     Ventricular Rate 08/01/2022 46      Atrial Rate 08/01/2022 62      QRS Duration 08/01/2022 142      QT 08/01/2022 512      QTc 08/01/2022 448      P [place] 08/01/2022 59      R AXIS 08/01/2022  -64      T [place] 08/01/2022 71      Interpretation ECG 08/01/2022                      Value:Sinus rhythm with A-V dissociation and Wide QRS rhythm with Premature ventricular complexes or Fusion complexes  Left axis deviation  Right bundle branch block  Inferior infarct , age undetermined  Abnormal ECG  When compared with ECG of 10-FEB-2020 16:16,  Wide QRS rhythm has replaced Sinus rhythm  Vent. rate has decreased BY  35 BPM  Confirmed by GENERATED REPORT, COMPUTER ([id number]),  Cece Haro ([id number]) on 8/1/2022 12:16:53 PM       Hold Specimen 08/01/2022 JIC      Hold Specimen 08/01/2022 JIC      Sodium 08/01/2022 137      Potassium 08/01/2022 4.3      Chloride 08/01/2022 107      Carbon Dioxide (CO2) 08/01/2022 28      Anion Gap 08/01/2022 2 (L)      Urea Nitrogen 08/01/2022 17      Creatinine 08/01/2022 0.93      Calcium 08/01/2022 9.0      Glucose 08/01/2022 101 (H)      GFR Estimate 08/01/2022 82      INR 08/01/2022 2.34 (H)      SARS CoV2 PCR 08/01/2022 Negative      WBC Count 08/01/2022 7.2      RBC Count 08/01/2022 4.59      Hemoglobin 08/01/2022 13.4      Hematocrit 08/01/2022 41.0      MCV 08/01/2022 89      MCH 08/01/2022 29.2      MCHC 08/01/2022 32.7      RDW 08/01/2022 12.3      Platelet Count 08/01/2022 255      % Neutrophils 08/01/2022 70      % Lymphocytes 08/01/2022 17      % Monocytes 08/01/2022 10      % Eosinophils 08/01/2022 3      % Basophils 08/01/2022 0      % Immature Granulocytes 08/01/2022 0      NRBCs per 100 WBC 08/01/2022 0      Absolute Neutrophils 08/01/2022 5.0      Absolute Lymphocytes 08/01/2022 1.3      Absolute Monocytes 08/01/2022 0.7      Absolute Eosinophils 08/01/2022 0.2      Absolute Basophils 08/01/2022 0.0      Absolute Immature Granul* 08/01/2022 0.0      Absolute NRBCs 08/01/2022 0.0      Ventricular Rate 08/01/2022 39      Atrial Rate 08/01/2022 39      QRS Duration 08/01/2022 134      QT 08/01/2022 538      QTc 08/01/2022 433      R AXIS 08/01/2022 -74      T Axis  08/01/2022 80      Interpretation ECG 08/01/2022                      Value:Sinus bradycardia with A-V dissociation and Idioventricular rhythm  Left axis deviation  Right bundle branch block  Inferior infarct , age undetermined  Abnormal ECG  When compared with ECG of 01-AUG-2022 14:14,  Sinus rhythm is now with A-V dissociation  Confirmed by GENERATED REPORT, COMPUTER ([id number]),  Lorne Colvin ([id number]) on 8/1/2022 5:40:00 PM       Sodium 08/02/2022 140      Potassium 08/02/2022 3.9      Chloride 08/02/2022 108      Carbon Dioxide (CO2) 08/02/2022 25      Anion Gap 08/02/2022 7      Urea Nitrogen 08/02/2022 14      Creatinine 08/02/2022 0.88      Calcium 08/02/2022 8.8      Glucose 08/02/2022 96      GFR Estimate 08/02/2022 86      WBC Count 08/02/2022 7.6      RBC Count 08/02/2022 4.22 (L)      Hemoglobin 08/02/2022 12.3 (L)      Hematocrit 08/02/2022 37.5 (L)      MCV 08/02/2022 89      MCH 08/02/2022 29.1      MCHC 08/02/2022 32.8      RDW 08/02/2022 12.5      Platelet Count 08/02/2022 260      INR 08/02/2022 1.94 (H)      Ventricular Rate 08/02/2022 43      Atrial Rate 08/02/2022 61      QRS Duration 08/02/2022 146      QT 08/02/2022 540      QTc 08/02/2022 456      P Axis 08/02/2022 68      R AXIS 08/02/2022 -69      T Axis 08/02/2022 96      Interpretation ECG 08/02/2022                      Value:Sinus rhythm with 2nd degree A-V block  Left axis deviation  Right bundle branch block  Inferior infarct , age undetermined  Abnormal ECG  When compared with ECG of 01-AUG-2022 17:34,  There have been no significant changes  Confirmed by MD GABRIEL, ENMA ([id number]),  Uriel Crouch ([id number]) on 8/4/2022 11:34:45 AM       Anti Xa Unfractionated H* 08/02/2022 0.42      GLUCOSE BY METER POCT 08/02/2022 89      Anti Xa Unfractionated H* 08/02/2022 0.39      Anti Xa Unfractionated H* 08/02/2022 0.34      WBC Count 08/02/2022 6.3      RBC Count 08/02/2022 4.49      Hemoglobin 08/02/2022 13.0 (L)      Hematocrit  08/02/2022 40.3      MCV 08/02/2022 90      MCH 08/02/2022 29.0      MCHC 08/02/2022 32.3      RDW 08/02/2022 12.5      Platelet Count 08/02/2022 251      INR 08/02/2022 1.78 (H)      LVEF  08/03/2022 55-60%      GLUCOSE BY METER POCT 08/02/2022 86      GLUCOSE BY METER POCT 08/02/2022 131 (H)      Ventricular Rate 08/03/2022 63      Atrial Rate 08/03/2022 63      [place] Interval 08/03/2022 254      QRS Duration 08/03/2022 168      QT 08/03/2022 494      QTc 08/03/2022 505      R AXIS 08/03/2022 -72      T [place] 08/03/2022 8      Interpretation ECG 08/03/2022                      Value:AV dual-paced rhythm with prolonged AV conduction with frequent atrial-paced complexes and with occasional Premature ventricular complexes  Abnormal ECG  When compared with ECG of 02-AUG-2022 01:17, (unconfirmed)  Electronic ventricular pacemaker has replaced Wide QRS rhythm  Confirmed by MD GABRIEL, DEMOS ([id number]),  Mathieu Regalado ([id number]) on 8/5/2022 10:21:05 AM       WBC Count 08/03/2022 7.2      RBC Count 08/03/2022 4.46      Hemoglobin 08/03/2022 13.0 (L)      Hematocrit 08/03/2022 40.3      MCV 08/03/2022 90      MCH 08/03/2022 29.1      MCHC 08/03/2022 32.3      RDW 08/03/2022 12.4      Platelet Count 08/03/2022 256      Sodium 08/03/2022 140      Potassium 08/03/2022 4.0      Chloride 08/03/2022 107      Carbon Dioxide (CO2) 08/03/2022 27      Anion Gap 08/03/2022 6      Urea Nitrogen 08/03/2022 15      Creatinine 08/03/2022 0.91      Calcium 08/03/2022 8.8      Glucose 08/03/2022 108 (H)      GFR Estimate 08/03/2022 85      Magnesium 08/03/2022 2.1      INR 08/03/2022 1.74 (H)      GLUCOSE BY METER POCT 08/03/2022 105 (H)    There may be more visits with results that are not included.        Total time spent for face to face visit, reviewing labs/imaging studies, counseling and coordination of care was: 1 Hour spent on the date of the encounter doing chart review, review of outside records, review of test results,  interpretation of tests, patient visit and documentation       This note was dictated using voice recognition software.  Any grammatical or context distortions are unintentional and inherent to the software.    No orders of the defined types were placed in this encounter.     New Prescriptions    No medications on file      Modified Medications    No medications on file              Again, thank you for allowing me to participate in the care of your patient.        Sincerely,        Isma English MD

## 2022-10-18 NOTE — NURSING NOTE
Chief Complaint   Patient presents with     Stroke     Memory loss issues    Dariana Paz LPN on 10/18/2022 at 10:58 AM

## 2022-10-18 NOTE — PROGRESS NOTES
NEUROLOGY CONSULTATION NOTE       [facility]UROLOG[facility] [place]  [address] [place] [address]  Tel: [phone]  Fax: [phone]  [email or web address]     Fausto Farr,  1940, MRN [id number]  PCP: Jodi Flower Mai  Date: 10/18/2022     ASSESSMENT & PLAN     Visit Diagnosis  1. History of stroke     History of left occipital infarction  82-year-old male with history of CAD s/p CABG, AAA s/p repair, mechanical AVR and MVR, A. fib, DM, HTN, HLD who was admitted to the hospital in 2022 for intermittent dizziness and visual symptoms.  MRI scan showed left occipital infarct.  CTA showed stenosis of the left vertebral artery, right ICA filling defect and extensive atherosclerotic plaque involving the carotid siphon.  He has made complete recovery and at present I have recommended:    1.  Continue Coumadin with goal of INR between 2.5-3.5.  2.  Continue Crestor with goal of LDL less than 70  3.  Continue physical therapy and home exercises  4.  Vascular risk factors modification: Healthy diet (fruits, vegetables, low fat dairy & reduced saturated fat), weight loss, exercise at least 30 minutes 5 days/week, BMI goal <25.  Keep systolic blood pressure goal <130.  LDL goal <70.  Hemoglobin A1c goal <7. If applicable, STOP smoking  5.  Follow-up will be in as needed basis    Thank you again for this referral, please feel free to contact me if you have any questions.    Isma English MD  [facility]  (Formerly, [facility]     REASON FOR CONSULTATION Stroke        HISTORY OF PRESENT ILLNESS     We have been requested by Dr. Flower to evaluate Fausto Farr who is a 82 year old  male for CVA    Patient is a 82-year-old male with history of CAD s/p CABG, AAA s/p repair, mechanical AVR and MVR, paroxysmal A. fib, GAGE, DM, HTN, HLD who was admitted to the hospital in 2022 for intermittent lightheadedness and  visual changes.  His symptoms were transient and resolved by the time he was discharged.  He had CT of the head that did not show acute changes.  CTA head and neck showed stenosis of the left vertebral artery, right ICA filling defect and extensive atherosclerotic plaque involving the carotid siphons.  MRI brain confirmed left occipital infarct.  Cardiology was consulted and they felt his ischemia was related to atrial fibrillation and a subtherapeutic INR.  He was told to continue on Coumadin, Crestor and Zetia.  He had echocardiogram during that hospitalization that showed an ejection fraction of 50 to 60% with mechanical mitral valve prosthesis and mechanical aortic valve prosthesis.  There was no myocardial effusion.  Since his discharge from the hospital he reports no further episodes of diplopia or any focal weakness.  He had lipid profile checked and his LDL was 69     PROBLEM LIST   Patient Active Problem List   Diagnosis Code     Ulcerative colitis (H) K51.90     Atrial fibrillation (H) I48.91     Gastric ulcer K25.9     Bilateral low back pain with left-sided sciatica M54.42     Diaphragmatic hernia K44.9     Nocturia R35.1     Malignant neoplasm of prostate (H) C61     Abdominal aortic aneurysm I71.40     Vitamin D deficiency disease E55.9     Anemia relative at 12.5 in 8-13  D64.9     Essential hypertension, benign I10     Hyperlipidemia LDL goal <100 E78.5     Prostate cancer (H) C61     Glucose intolerance (impaired glucose tolerance) R73.02     Sciatica of left side since 2000 M54.32     Valvular heart disease I38     Heart murmur R01.1     Health Care Home Z76.89     Coronary artery disease I25.10     ACP (advance care planning) Z71.89     S/P aortic valve replacement Z95.2     S/P CABG (coronary artery bypass graft) Z95.1     Stented coronary artery Z95.5     Mixed hyperlipidemia E78.2     PVD (peripheral vascular disease) (H) I73.9     Personal history of tobacco use, presenting hazards to health  Z87.891     Spinal stenosis of lumbar region without neurogenic claudication M48.061     S/P mitral valve replacement Z95.2     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     S/P lumbar spinal fusion Z98.1     Long term current use of anticoagulant therapy Z79.01     Chronic systolic congestive heart failure (H) I50.22     GAGE (obstructive sleep apnea) G47.33     Obesity (BMI 35.0-39.9) with comorbidity (H) E66.01     Knee pain, chronic M25.569, G89.29     Diabetes mellitus, type 2 (H) E11.9     History of left occipital stroke Z86.73         PAST MEDICAL & SURGICAL HISTORY     Past Medical History:   Patient  has a past medical history of Abdominal pain, Anemia, Back pain, BPH (benign prostatic hyperplasia), Bruit, CAD (coronary artery disease), Cellulitis (10/18/2012), Cellulitis (05/2018), Chronic venous insufficiency, Contact dermatitis and other eczema, due to unspecified cause, Diaphragmatic hernia without mention of obstruction or gangrene, Diastolic HF (heart failure) (H), Gastric ulcer, Hypertension (08/06/2013), Lumbago, Mesenteric artery insufficiency (H), Metabolic syndrome, Mitral valve disease, Nonallopathic lesion of lumbar region, Nonallopathic lesion of rib cage, Nonallopathic lesion of sacral region, GAGE (obstructive sleep apnea), Paroxysmal atrial fibrillation (H) (10/18/2012), Prostate cancer (H) (2008), PVD (peripheral vascular disease) (H), Rotator cuff strain, S/P AAA repair, S/P aortic valve replacement (2006), S/P CABG (coronary artery bypass graft) (2006), Sciatica of left side, Sepsis due to group B Streptococcus (H) (05/19/2018), TIA (transient ischemic attack) (8/1/2022), Total knee replacement status (7/22/2019), Ulcerative colitis (H), Varicose veins of bilateral lower extremities with other complications, and Vitamin D deficiency.    Surgical History:  He  has a past surgical history that includes knee surgery (2001 Right knee arthroscopy); Surgery General IP Consult (05/2008  Excision aneurysm abdominal aorta); Surgery General IP Consult (1997 Vocal cord polypectomy); endovascular repair, infrarenal abdominal aortic aneurysm/dissection; modular bifurcated prosthesis (2006); vascular surgery (1968, 1993 ); colonoscopy (8-22-11); ENT surgery; Head and neck surgery (1997); Abdomen surgery (6/16/08); Prostate surgery; Repair aneurysm abdominal aorta (06/08); Cystoscopy flexible (10/16/2013); cardiac catherization (11/2005); cardiac catherization (04/2013); Optical tracking system fusion spine posterior lumbar three+ levels (N/A, 10/29/2015); carpal tunnel release rt/lt (1994); Arthroplasty knee; back surgery (Oct 2015); Ablation Atrial Flutter (N/A, 4/22/2019); Arthroplasty knee (Right, 7/22/2019); CABG, VEIN, TWO (1/3/06); aortic valve replacement (1/3/06); Repair valve mitral (10/16/2013); Replace valve aortic (10/16/2013); Bypass graft artery coronary (10/2013); Incision and drainage knee, combined (Right, 8/29/2019); Graft skin split thickness from extremity (Right, 11/12/2019); Apply Wound Vac (N/A, 11/12/2019); Graft flap pedicle extremity (location) (Right, 11/12/2019); Irrigation and debridement lower extremity, combined (Right, 12/8/2019); Irrigation And Debridement Knee, Place A (Right, 2/12/2020); and Pacemaker Device & Lead Implant - Right Atrial & Right Ventricular (N/A, 8/2/2022).     SOCIAL HISTORY     Reviewed, and he  reports that he quit smoking about 20 years ago. His smoking use included cigarettes. He started smoking about 60 years ago. He has a 40.00 pack-year smoking history. He has never used smokeless tobacco. He reports current alcohol use. He reports that he does not use drugs.     FAMILY HISTORY     Reviewed, and family history includes Coronary Artery Disease in his father; Heart Disease in his brother and father; No Known Problems in his brother, daughter, mother, sister, and son; Other - See Comments in his grandchild; Other Cancer in his daughter.      ALLERGIES     Allergies   Allergen Reactions     Bees Anaphylaxis         REVIEW OF SYSTEMS     A 12 point review of system was performed and was negative except as outlined in the history of present illness.     HOME MEDICATIONS     Current Outpatient Rx   Medication Sig Dispense Refill     acetaminophen (TYLENOL) 500 MG tablet Take 500-1,000 mg by mouth every 6 hours as needed for pain       amoxicillin-clavulanate (AUGMENTIN) 875-125 MG tablet Take 1 tablet by mouth daily       betamethasone valerate (VALISONE) 0.1 % external lotion Apply topically 2 times daily Apply topically to scalp twice daily PRN 60 mL 3     cholecalciferol 25 MCG (1000 UT) TABS Take 1,000 Units by mouth daily       ezetimibe (ZETIA) 10 MG tablet Take 1 tablet (10 mg) by mouth daily 90 tablet 3     ferrous sulfate (FEROSUL) 325 (65 Fe) MG tablet Take 325 mg by mouth daily (with breakfast)       fluocinonide (LIDEX) 0.05 % external ointment Apply topically 2 times daily (Patient taking differently: Apply topically 2 times daily as needed) 60 g 11     glucosamine-chondroitin 500-400 MG CAPS per capsule Take 1 capsule by mouth every evening       mesalamine (ASACOL HD) 800 MG EC tablet Take 1 tablet (800 mg) by mouth 2 times daily 180 tablet 3     metoprolol succinate ER (TOPROL XL) 50 MG 24 hr tablet Take 1 1/2 tab (75mg) daily 135 tablet 1     rosuvastatin (CRESTOR) 40 MG tablet Take 1 tablet (40 mg) by mouth daily 90 tablet 3     tamsulosin (FLOMAX) 0.4 MG capsule Take 1 capsule (0.4 mg) by mouth daily 90 capsule 3     triamcinolone (KENALOG) 0.1 % external lotion Apply topically 2 times daily (Patient taking differently: Apply topically 2 times daily as needed) 60 mL 1     warfarin ANTICOAGULANT (COUMADIN) 5 MG tablet Take 5mg every Sun & Wed / Take 7.5mg all other days OR per INR clinic 120 tablet 1     triamcinolone (KENALOG) 0.1 % external cream Apply topically 2 times daily (Patient taking differently: Apply topically 2 times daily  "as needed) 85.2 g 1         PHYSICAL EXAM     Vital signs  /58 (BP Location: Left arm)   Pulse 66   Ht 1.651 m (5' 5\")   Wt 99 kg (218 lb 3.2 oz)   BMI 36.31 kg/m      Weight:   218 lbs 3.2 oz    Middle-age gentleman who is alert and oriented vital signs are reviewed and are documented in electronic medical record.  Neck supple.  Neurologically speech was normal.  Cranial nerves II through XII are intact except he is hard of hearing.  Strength is 5/5 reflexes 2+ except absent in the right knee toes downgoing.  Sensation intact.  No dysmetria noted on finger-nose testing gait normal.     PERTINENT DIAGNOSTIC STUDIES     Following studies were reviewed:     CTA HEAD & NECK 8/1/2022  CTA Head:   1. Extensive atherosclerotic plaquing involving the carotid siphons.  2. Filling defect within the right internal carotid artery at the  cavernous segment which could represent exophytic plaque or  nonocclusive thrombus.   CTA Neck:   1. Moderate stenosis at the left vertebral artery origin.  2. Otherwise, no evidence of large vessel occlusion or high-grade  stenosis.   3. Incidental findings as detailed.    MRI BRAIN 8/2/2022  1. Punctate acute/subacute infarct in the paramedian left occipital  lobe. No definite findings of acute intracranial hemorrhage or  significant mass effect.  2. Several probable chronic parenchymal microhemorrhages primarily in  a superficial cortical/subcortical distribution.  3. Mild generalized brain parenchymal volume loss and presumed chronic  small vessel ischemic changes.    ECHOCARDIOGRAM 8/2/2022  There is moderate concentric left ventricular hypertrophy.  Left ventricular systolic function is normal.  The visual ejection fraction is 55-60%.  The right ventricle is borderline dilated.  The right ventricular systolic function is normal.  Mild (35-45mmHg) pulmonary hypertension is present.  Mechanical steven valve prosthesis (21 mm St Solo). Well seated. Mean gradient  6 mmHg at HR 71 " bpm. MR not appreciated due to acoustic shadowing.  Mechanical aortic valve prosthesis (27 mm St. Solo). Well seated with no  valvular or perivalvular AI. Mean gradient of 11 mmHg. Vmax 2.5 m/sec. AT <  100 msec. Normal Doppler interrogation for this valve.  The inferior vena cava was normal in size with preserved respiratory  variability.  There is no pericardial effusion.     Findings similar to study dated 7/2/2020. Gradients across both mechanical  prostheses are slightly increased compared to prior however still within  normal limits for these valve types.     PERTINENT LABS  Following labs were reviewed:  Hospital Outpatient Visit on 10/12/2022   Component Date Value     Creatinine POCT 10/12/2022 1.0      GFR, ESTIMATED POCT 10/12/2022 >60    Lab on 10/05/2022   Component Date Value     INR 10/05/2022 3.4 (H)    Ancillary Procedure on 09/23/2022   Component Date Value     Date Time Interrogation * 09/23/2022 20220923000000      Implantable Pulse Genera* 09/23/2022 [employer]      Implantable Pulse Genera* 09/23/2022 L331 ACCOLADE MRI EL      Implantable Pulse Genera* 09/23/2022 [id number]      Type Interrogation Sessi* 09/23/2022 In Clinic      Clinic Name 09/23/2022 [facility]      Implantable Pulse Genera* 09/23/2022 Pacemaker      Implantable Pulse Genera* 09/23/2022 [id number]      Implantable Lead Manufac* 09/23/2022 [employer]      Implantable Lead Model 09/23/2022 7840 Ingevity + MRI      Implantable Lead Serial * 09/23/2022 [id number]      Implantable Lead Implant* 09/23/2022 [id number]      Implantable Lead Polarit* 09/23/2022 Bipolar Lead      Implantable Lead Locatio* 09/23/2022 UNKNOWN      Implantable Lead Location 09/23/2022 Right Atrium      Implantable Lead Manufac* 09/23/2022 [employer]      Implantable Lead Model 09/23/2022 7841 Ingevity + MRI      Implantable Lead Serial * 09/23/2022 [id number]      Implantable Lead Implant* 09/23/2022 [id number]      Implantable Lead Polarit*  09/23/2022 Bipolar Lead      Implantable Lead Locatio* 09/23/2022 UNKNOWN      Implantable Lead Location 09/23/2022 Right Ventricle      Jalil Setting Mode (NBG * 09/23/2022 DDD      Jalil Setting Lower Rate* 09/23/2022 60      Jalil Setting Maximum Tr* 09/23/2022 130      Jalil Setting Maximum Se* 09/23/2022 130      Jalil Setting Hysterisis* 09/23/2022 Off      Jalil Setting RAGHAV Delay * 09/23/2022 250.0      Jalil Setting PAV Delay * 09/23/2022 250.0      Jalil Setting PAV Delay * 09/23/2022 200.0      Jalil Setting RAGHAV Delay * 09/23/2022 200.0      Jalil Setting AT Mode Sw* 09/23/2022 170      Jalil Setting AT Mode Sw* 09/23/2022 DDIR      Lead Channel Setting Sen* 09/23/2022 Bipolar      Lead Channel Setting Sen* 09/23/2022 0.25      Lead Channel Setting Sen* 09/23/2022 Adaptive      Lead Channel Setting Sen* 09/23/2022 Bipolar      Lead Channel Setting Sen* 09/23/2022 0.6      Lead Channel Setting Sen* 09/23/2022 Adaptive      Lead Channel Setting Pac* 09/23/2022 Bipolar      Lead Channel Setting Pac* 09/23/2022 0.4      Lead Channel Setting Pac* 09/23/2022 2.0      Lead Channel Setting Pac* 09/23/2022 Adaptive      Lead Channel Setting Pac* 09/23/2022 Bipolar      Lead Channel Setting Pac* 09/23/2022 0.4      Lead Channel Setting Pac* 09/23/2022 1.4      Lead Channel Setting Pac* 09/23/2022 Adaptive      Zone Setting Type Catego* 09/23/2022 VT      Zone Setting Vendor Type* 09/23/2022 VT      Zone Setting Detection I* 09/23/2022 375.0      Lead Channel Impedance V* 09/23/2022 711.0      Lead Channel Sensing Int* 09/23/2022 4.0      Lead Channel Pacing Thre* 09/23/2022 0.8000      Lead Channel Pacing Thre* 09/23/2022 0.4      Lead Channel Impedance V* 09/23/2022 765.0      Lead Channel Sensing Int* 09/23/2022 25      Lead Channel Pacing Thre* 09/23/2022 0.8000      Lead Channel Pacing Thre* 09/23/2022 0.4      Battery Status 09/23/2022 Beginning of Service      Episode Statistic Total * 09/23/2022 4       Episode Statistic Type C* 09/23/2022 VT      Episode Statistic Vendor* 09/23/2022 NSVT      Episode Statistic Total * 09/23/2022 20220923000000      Episode Statistic Recent* 09/23/2022 3      Episode Statistic Type C* 09/23/2022 VT      Episode Statistic Vendor* 09/23/2022 NSVT      Episode Statistic Recent* 09/23/2022 Fri Aug 12 05:01:04 CDT 2022      Episode Statistic Recent* 09/23/2022 [id number]    Lab on 09/14/2022   Component Date Value     INR 09/14/2022 2.8 (H)    Lab on 09/01/2022   Component Date Value     INR 09/01/2022 3.5 (H)    Lab on 08/23/2022   Component Date Value     INR 08/23/2022 4.6 (H)    Ancillary Procedure on 08/12/2022   Component Date Value     Date Time Interrogation * 08/12/2022 [id number]      Implantable Pulse Genera* 08/12/2022 [employer]      Implantable Pulse Genera* 08/12/2022 L331 ACCOLADE MRI EL      Implantable Pulse Genera* 08/12/2022 [id number]      Type Interrogation Sessi* 08/12/2022 In Clinic      Clinic Name 08/12/2022 [facility]      Implantable Pulse Genera* 08/12/2022 Pacemaker      Implantable Pulse Genera* 08/12/2022 [id number]      Implantable Lead Manufac* 08/12/2022 [employer]      Implantable Lead Model 08/12/2022 7840 Ingevity + MRI      Implantable Lead Serial * 08/12/2022 [id number]      Implantable Lead Implant* 08/12/2022 [id number]      Implantable Lead Polarit* 08/12/2022 Bipolar Lead      Implantable Lead Locatio* 08/12/2022 UNKNOWN      Implantable Lead Location 08/12/2022 Right Atrium      Implantable Lead Manufac* 08/12/2022 [employer]      Implantable Lead Model 08/12/2022 7841 Ingevity + MRI      Implantable Lead Serial * 08/12/2022 [id number]      Implantable Lead Implant* 08/12/2022 20220802      Implantable Lead Polarit* 08/12/2022 Bipolar Lead      Implantable Lead Locatio* 08/12/2022 UNKNOWN      Implantable Lead Location 08/12/2022 Right Ventricle      Jalil Setting Mode (NBG * 08/12/2022 DDD      Jalil Setting Lower Rate*  08/12/2022 60      Jalil Setting Maximum Tr* 08/12/2022 130      Jalil Setting Maximum Se* 08/12/2022 130      Jalil Setting RAGHAV Delay * 08/12/2022 250      Jalil Setting PAV Delay * 08/12/2022 250      Jalil Setting PAV Delay * 08/12/2022 200      Jalil Setting RAGHAV Delay * 08/12/2022 200      Jalil Setting AT Mode Sw* 08/12/2022 170      Jalil Setting AT Mode Sw* 08/12/2022 DDIR      Lead Channel Setting Sen* 08/12/2022 Bipolar      Lead Channel Setting Sen* 08/12/2022 0.25      Lead Channel Setting Sen* 08/12/2022 Adaptive      Lead Channel Setting Sen* 08/12/2022 Bipolar      Lead Channel Setting Sen* 08/12/2022 0.6      Lead Channel Setting Sen* 08/12/2022 Adaptive      Lead Channel Setting Pac* 08/12/2022 Bipolar      Lead Channel Setting Pac* 08/12/2022 0.4      Lead Channel Setting Pac* 08/12/2022 2.0      Lead Channel Setting Pac* 08/12/2022 Adaptive      Lead Channel Setting Pac* 08/12/2022 Bipolar      Lead Channel Setting Pac* 08/12/2022 0.4      Lead Channel Setting Pac* 08/12/2022 1.2      Lead Channel Setting Pac* 08/12/2022 Adaptive      Zone Setting Type Catego* 08/12/2022 VT      Zone Setting Vendor Type* 08/12/2022 VT      Zone Setting Detection I* 08/12/2022 375      Lead Channel Impedance V* 08/12/2022 633      Lead Channel Pacing Thre* 08/12/2022 0.7      Lead Channel Pacing Thre* 08/12/2022 0.4      Lead Channel Impedance V* 08/12/2022 653      Lead Channel Pacing Thre* 08/12/2022 0.7      Lead Channel Pacing Thre* 08/12/2022 0.4      Battery Date Time of Dori* 08/12/2022 20220812095000      Battery Status 08/12/2022 Beginning of Service      Battery Remaining Longev* 08/12/2022 168      Battery Remaining Percen* 08/12/2022 100      Jalil Statistic Date Tyron* 08/12/2022 20220802000000      Jalil Statistic Date Tyron* 08/12/2022 20220812000000      Jalil Statistic RA Perce* 08/12/2022 7      Jalil Statistic RV Perce* 08/12/2022 88      Atrial Tachy Statistic D* 08/12/2022 [id number]      Atrial  Tachy Statistic D* 08/12/2022 32581490796039      Atrial Tachy Statistic A* 08/12/2022 0      Episode Statistic Recent* 08/12/2022 0      Episode Statistic Type C* 08/12/2022 AT/AF      Episode Statistic Vendor* 08/12/2022 AF      Episode Statistic Recent* 08/12/2022 0      Episode Statistic Type C* 08/12/2022 Other      Episode Statistic Recent* 08/12/2022 0      Episode Statistic Type C* 08/12/2022 SVT      Episode Statistic Vendor* 08/12/2022 SVT      Episode Statistic Recent* 08/12/2022 1      Episode Statistic Type C* 08/12/2022 VT      Episode Statistic Vendor* 08/12/2022 NSVT      Episode Statistic Recent* 08/12/2022 0      Episode Statistic Type C* 08/12/2022 VT      Episode Statistic Vendor* 08/12/2022 VT      Episode Statistic Recent* 08/12/2022 [id number]      Episode Statistic Recent* 08/12/2022 [id number]      Episode Statistic Recent* 08/12/2022 91275924075592      Episode Statistic Recent* 08/12/2022 98052655957217      Episode Statistic Recent* 08/12/2022 10721623384579      Episode Statistic Recent* 08/12/2022 16037036589632      Episode Statistic Recent* 08/12/2022 42342476194892      Episode Statistic Recent* 08/12/2022 22890334649813      Episode Statistic Recent* 08/12/2022 [id number]      Episode Statistic Recent* 08/12/2022 [id number]    Lab on 08/08/2022   Component Date Value     INR 08/08/2022 3.01 (H)    Lab on 08/04/2022   Component Date Value     INR 08/04/2022 2.3 (H)    Admission on 08/01/2022, Discharged on 08/03/2022   Component Date Value     Ventricular Rate 08/01/2022 46      Atrial Rate 08/01/2022 62      QRS Duration 08/01/2022 142      QT 08/01/2022 512      QTc 08/01/2022 448      P Garrison 08/01/2022 59      R AXIS 08/01/2022 -64      T Axis 08/01/2022 71      Interpretation ECG 08/01/2022                      Value:Sinus rhythm with A-V dissociation and Wide QRS rhythm with Premature ventricular complexes or Fusion complexes  Left axis deviation  Right bundle  branch block  Inferior infarct , age undetermined  Abnormal ECG  When compared with ECG of 10-FEB-2020 16:16,  Wide QRS rhythm has replaced Sinus rhythm  Vent. rate has decreased BY  35 BPM  Confirmed by GENERATED REPORT, COMPUTER ([id number]),  Cece Haro ([id number]) on 8/1/2022 12:16:53 PM       Hold Specimen 08/01/2022 JIC      Hold Specimen 08/01/2022 JIC      Sodium 08/01/2022 137      Potassium 08/01/2022 4.3      Chloride 08/01/2022 107      Carbon Dioxide (CO2) 08/01/2022 28      Anion Gap 08/01/2022 2 (L)      Urea Nitrogen 08/01/2022 17      Creatinine 08/01/2022 0.93      Calcium 08/01/2022 9.0      Glucose 08/01/2022 101 (H)      GFR Estimate 08/01/2022 82      INR 08/01/2022 2.34 (H)      SARS CoV2 PCR 08/01/2022 Negative      WBC Count 08/01/2022 7.2      RBC Count 08/01/2022 4.59      Hemoglobin 08/01/2022 13.4      Hematocrit 08/01/2022 41.0      MCV 08/01/2022 89      MCH 08/01/2022 29.2      MCHC 08/01/2022 32.7      RDW 08/01/2022 12.3      Platelet Count 08/01/2022 255      % Neutrophils 08/01/2022 70      % Lymphocytes 08/01/2022 17      % Monocytes 08/01/2022 10      % Eosinophils 08/01/2022 3      % Basophils 08/01/2022 0      % Immature Granulocytes 08/01/2022 0      NRBCs per 100 WBC 08/01/2022 0      Absolute Neutrophils 08/01/2022 5.0      Absolute Lymphocytes 08/01/2022 1.3      Absolute Monocytes 08/01/2022 0.7      Absolute Eosinophils 08/01/2022 0.2      Absolute Basophils 08/01/2022 0.0      Absolute Immature Granul* 08/01/2022 0.0      Absolute NRBCs 08/01/2022 0.0      Ventricular Rate 08/01/2022 39      Atrial Rate 08/01/2022 39      QRS Duration 08/01/2022 134      QT 08/01/2022 538      QTc 08/01/2022 433      R AXIS 08/01/2022 -74      T Millry 08/01/2022 80      Interpretation ECG 08/01/2022                      Value:Sinus bradycardia with A-V dissociation and Idioventricular rhythm  Left axis deviation  Right bundle branch block  Inferior infarct , age undetermined  Abnormal  ECG  When compared with ECG of 01-AUG-2022 14:14,  Sinus rhythm is now with A-V dissociation  Confirmed by GENERATED REPORT, COMPUTER ([id number]),  Lorne Colvin ([id number]) on 8/1/2022 5:40:00 PM       Sodium 08/02/2022 140      Potassium 08/02/2022 3.9      Chloride 08/02/2022 108      Carbon Dioxide (CO2) 08/02/2022 25      Anion Gap 08/02/2022 7      Urea Nitrogen 08/02/2022 14      Creatinine 08/02/2022 0.88      Calcium 08/02/2022 8.8      Glucose 08/02/2022 96      GFR Estimate 08/02/2022 86      WBC Count 08/02/2022 7.6      RBC Count 08/02/2022 4.22 (L)      Hemoglobin 08/02/2022 12.3 (L)      Hematocrit 08/02/2022 37.5 (L)      MCV 08/02/2022 89      MCH 08/02/2022 29.1      MCHC 08/02/2022 32.8      RDW 08/02/2022 12.5      Platelet Count 08/02/2022 260      INR 08/02/2022 1.94 (H)      Ventricular Rate 08/02/2022 43      Atrial Rate 08/02/2022 61      QRS Duration 08/02/2022 146      QT 08/02/2022 540      QTc 08/02/2022 456      P Axis 08/02/2022 68      R AXIS 08/02/2022 -69      T Axis 08/02/2022 96      Interpretation ECG 08/02/2022                      Value:Sinus rhythm with 2nd degree A-V block  Left axis deviation  Right bundle branch block  Inferior infarct , age undetermined  Abnormal ECG  When compared with ECG of 01-AUG-2022 17:34,  There have been no significant changes  Confirmed by MD GABRIEL, ENMA ([id number]),  Uriel Crouch ([id number]) on 8/4/2022 11:34:45 AM       Anti Xa Unfractionated H* 08/02/2022 0.42      GLUCOSE BY METER POCT 08/02/2022 89      Anti Xa Unfractionated H* 08/02/2022 0.39      Anti Xa Unfractionated H* 08/02/2022 0.34      WBC Count 08/02/2022 6.3      RBC Count 08/02/2022 4.49      Hemoglobin 08/02/2022 13.0 (L)      Hematocrit 08/02/2022 40.3      MCV 08/02/2022 90      MCH 08/02/2022 29.0      MCHC 08/02/2022 32.3      RDW 08/02/2022 12.5      Platelet Count 08/02/2022 251      INR 08/02/2022 1.78 (H)      LVEF  08/03/2022 55-60%      GLUCOSE BY METER POCT  08/02/2022 86      GLUCOSE BY METER POCT 08/02/2022 131 (H)      Ventricular Rate 08/03/2022 63      Atrial Rate 08/03/2022 63      OR Interval 08/03/2022 254      QRS Duration 08/03/2022 168      QT 08/03/2022 494      QTc 08/03/2022 505      R AXIS 08/03/2022 -72      T Boissevain 08/03/2022 8      Interpretation ECG 08/03/2022                      Value:AV dual-paced rhythm with prolonged AV conduction with frequent atrial-paced complexes and with occasional Premature ventricular complexes  Abnormal ECG  When compared with ECG of 02-AUG-2022 01:17, (unconfirmed)  Electronic ventricular pacemaker has replaced Wide QRS rhythm  Confirmed by MD GABRIEL, DEMOS ([id number]),  Mathieu Regalado ([id number]) on 8/5/2022 10:21:05 AM       WBC Count 08/03/2022 7.2      RBC Count 08/03/2022 4.46      Hemoglobin 08/03/2022 13.0 (L)      Hematocrit 08/03/2022 40.3      MCV 08/03/2022 90      MCH 08/03/2022 29.1      MCHC 08/03/2022 32.3      RDW 08/03/2022 12.4      Platelet Count 08/03/2022 256      Sodium 08/03/2022 140      Potassium 08/03/2022 4.0      Chloride 08/03/2022 107      Carbon Dioxide (CO2) 08/03/2022 27      Anion Gap 08/03/2022 6      Urea Nitrogen 08/03/2022 15      Creatinine 08/03/2022 0.91      Calcium 08/03/2022 8.8      Glucose 08/03/2022 108 (H)      GFR Estimate 08/03/2022 85      Magnesium 08/03/2022 2.1      INR 08/03/2022 1.74 (H)      GLUCOSE BY METER POCT 08/03/2022 105 (H)    There may be more visits with results that are not included.        Total time spent for face to face visit, reviewing labs/imaging studies, counseling and coordination of care was: 1 Hour spent on the date of the encounter doing chart review, review of outside records, review of test results, interpretation of tests, patient visit and documentation       This note was dictated using voice recognition software.  Any grammatical or context distortions are unintentional and inherent to the software.    No orders of the defined types were  placed in this encounter.     New Prescriptions    No medications on file      Modified Medications    No medications on file

## 2022-11-02 ENCOUNTER — LAB (OUTPATIENT)
Dept: LAB | Facility: CLINIC | Age: 82
End: 2022-11-02
Payer: MEDICARE

## 2022-11-02 ENCOUNTER — ANTICOAGULATION THERAPY VISIT (OUTPATIENT)
Dept: ANTICOAGULATION | Facility: CLINIC | Age: 82
End: 2022-11-02

## 2022-11-02 DIAGNOSIS — Z95.2 S/P MITRAL VALVE REPLACEMENT: ICD-10-CM

## 2022-11-02 DIAGNOSIS — Z95.2 S/P AORTIC VALVE REPLACEMENT: Primary | ICD-10-CM

## 2022-11-02 DIAGNOSIS — Z79.01 LONG TERM CURRENT USE OF ANTICOAGULANT THERAPY: ICD-10-CM

## 2022-11-02 DIAGNOSIS — I48.19 PERSISTENT ATRIAL FIBRILLATION (H): ICD-10-CM

## 2022-11-02 DIAGNOSIS — Z95.2 S/P AORTIC VALVE REPLACEMENT: ICD-10-CM

## 2022-11-02 LAB — INR BLD: 2.8 (ref 0.9–1.1)

## 2022-11-02 PROCEDURE — 85610 PROTHROMBIN TIME: CPT

## 2022-11-02 PROCEDURE — 36416 COLLJ CAPILLARY BLOOD SPEC: CPT

## 2022-11-02 NOTE — PROGRESS NOTES
ANTICOAGULATION MANAGEMENT     Fausto Farr 82 year old male is on warfarin with therapeutic INR result. (Goal INR 2.5-3.5)    Recent labs: (last 7 days)     11/02/22  0855   INR 2.8*       ASSESSMENT       Source(s): Chart Review    Previous INR was Therapeutic last 2(+) visits    Medication, diet, health changes since last INR chart reviewed; none identified     Neurology appointment on 10/18/22. No changes made.           PLAN     Unable to reach Ralph today since he was not available at the time of the call.  Left message with patient's wife to have patient call INR clinic to discuss results.      Follow up required to confirm warfarin dose taken and assess for changes    Ruthann Sanders RN  Anticoagulation Clinic  11/2/2022

## 2022-11-02 NOTE — PROGRESS NOTES
ANTICOAGULATION MANAGEMENT     Fausto Farr 82 year old male is on warfarin with therapeutic INR result. (Goal INR 2.5-3.5)    Recent labs: (last 7 days)     11/02/22  0855   INR 2.8*       ASSESSMENT       Source(s): Chart Review and Patient/Caregiver Call       Warfarin doses taken: Warfarin taken as instructed    Diet: No new diet changes identified    New illness, injury, or hospitalization: No    Medication/supplement changes: None noted    Signs or symptoms of bleeding or clotting: No    Previous INR: Therapeutic last 2(+) visits    Additional findings: None       PLAN     Recommended plan for no diet, medication or health factor changes affecting INR     Dosing Instructions: Continue your current warfarin dose with next INR in 4 weeks       Summary  As of 11/2/2022    Full warfarin instructions:  5 mg every Sun, Wed; 7.5 mg all other days; Starting 11/2/2022   Next INR check:  11/30/2022             Telephone call with Ralph who agrees to plan and repeated back plan correctly    Lab visit scheduled    Education provided:     Please call back if any changes to your diet, medications or how you've been taking warfarin    Plan made per [facility] anticoagulation protocol    Ruthann Sanders RN  Anticoagulation Clinic  11/2/2022    _______________________________________________________________________     Anticoagulation Episode Summary     Current INR goal:  2.5-3.5   TTR:  74.3 % (1 y)   Target end date:  Indefinite   Send INR reminders to:  SHANNA DOWELL    Indications    S/P aortic valve replacement [Z95.2]  S/P mitral valve replacement [Z95.2]  Long term current use of anticoagulant therapy [Z79.01]           Comments:  Per 9/20/22 Cardio Note strict INR goal of 2.5-3.5. If INR falls below 2.5 at any time, needs to be bridged with Lovenox.         Anticoagulation Care Providers     Provider Role Specialty Phone number    Jodi Flower Mai, MD Referring Internal Medicine - Pediatrics [phone]

## 2022-11-15 ENCOUNTER — TELEPHONE (OUTPATIENT)
Dept: CARDIOLOGY | Facility: CLINIC | Age: 82
End: 2022-11-15

## 2022-11-15 NOTE — TELEPHONE ENCOUNTER
Pt called requesting an in clinic device check. Pt has not yet received his monitor. Returned call to pt. No answer. Unable to leave . [phone]. JUSTIN Carrero

## 2022-11-16 NOTE — TELEPHONE ENCOUNTER
Pt called back again and left VM. I called pt and spoke with him. We scheduled pt for first available device check in [place] for 1/18/2023. Told pt we will keep his remote check for December on the schedule for now in case his monitor does come before then, and if that is the case we can cancel the in-clinic check. Pt agrees with this plan.

## 2022-11-30 ENCOUNTER — ANTICOAGULATION THERAPY VISIT (OUTPATIENT)
Dept: ANTICOAGULATION | Facility: CLINIC | Age: 82
End: 2022-11-30

## 2022-11-30 ENCOUNTER — LAB (OUTPATIENT)
Dept: LAB | Facility: CLINIC | Age: 82
End: 2022-11-30
Payer: MEDICARE

## 2022-11-30 DIAGNOSIS — Z95.2 S/P MITRAL VALVE REPLACEMENT: ICD-10-CM

## 2022-11-30 DIAGNOSIS — Z79.01 LONG TERM CURRENT USE OF ANTICOAGULANT THERAPY: ICD-10-CM

## 2022-11-30 DIAGNOSIS — I48.19 PERSISTENT ATRIAL FIBRILLATION (H): ICD-10-CM

## 2022-11-30 DIAGNOSIS — Z95.2 S/P AORTIC VALVE REPLACEMENT: ICD-10-CM

## 2022-11-30 DIAGNOSIS — Z95.2 S/P AORTIC VALVE REPLACEMENT: Primary | ICD-10-CM

## 2022-11-30 LAB — INR BLD: 3 (ref 0.9–1.1)

## 2022-11-30 PROCEDURE — 36415 COLL VENOUS BLD VENIPUNCTURE: CPT

## 2022-11-30 PROCEDURE — 85610 PROTHROMBIN TIME: CPT

## 2022-11-30 NOTE — PROGRESS NOTES
ANTICOAGULATION MANAGEMENT     Fausto Farr 82 year old male is on warfarin with therapeutic INR result. (Goal INR 2.5-3.5)    Recent labs: (last 7 days)     11/30/22  0906   INR 3.0*       ASSESSMENT       Source(s): Chart Review    Previous INR was Therapeutic last 2(+) visits    Medication, diet, health changes since last INR chart reviewed; none identified           PLAN     Recommended plan for no diet, medication or health factor changes affecting INR     Dosing Instructions: Continue your current warfarin dose with next INR in 5 weeks       Summary  As of 11/30/2022    Full warfarin instructions:  5 mg every Sun, Wed; 7.5 mg all other days; Starting 11/30/2022   Next INR check:  1/4/2023             Detailed voice message left for Ralph with dosing instructions and follow up date.     Contact [phone]  to schedule and with any changes, questions or concerns.     Education provided:     Please call back if any changes to your diet, medications or how you've been taking warfarin    Contact [phone]  with any changes, questions or concerns.     Plan made per [facility] anticoagulation protocol    Nalini Guillen RN  Anticoagulation Clinic  11/30/2022    _______________________________________________________________________     Anticoagulation Episode Summary     Current INR goal:  2.5-3.5   TTR:  81.2 % (1 y)   Target end date:  Indefinite   Send INR reminders to:  SAMAG DELMER    Indications    S/P aortic valve replacement [Z95.2]  S/P mitral valve replacement [Z95.2]  Long term current use of anticoagulant therapy [Z79.01]           Comments:  Per 9/20/22 Cardio Note strict INR goal of 2.5-3.5. If INR falls below 2.5 at any time, needs to be bridged with Lovenox. consent to leave DVM for medical information and scheduling         Anticoagulation Care Providers     Provider Role Specialty Phone number    Jodi Flower Mai, MD Referring Internal Medicine - Pediatrics [phone]

## 2022-12-03 ENCOUNTER — HEALTH MAINTENANCE LETTER (OUTPATIENT)
Age: 82
End: 2022-12-03

## 2022-12-08 ENCOUNTER — TELEPHONE (OUTPATIENT)
Dept: PEDIATRICS | Facility: CLINIC | Age: 82
End: 2022-12-08

## 2022-12-08 NOTE — TELEPHONE ENCOUNTER
Oh his last wellness visit was changed to an acute visit because he had a complete heart block and was sent directly to the hospital.  He has a DM f/up visit with me in Feb - we can do his annual then if he is agreeable.    Chris Flower MD

## 2022-12-08 NOTE — TELEPHONE ENCOUNTER
Per , Medical annual wellness visit was due on 7/22/22, A1C diabetic foot exam, eye exam and HF AP due.    Per chart review, 8/8/22 visit as for a hospital follow up, 8/1/22 was a preventive visit, which was deferred due to acute issues and patient was sent to the ER.    Patient is due for a Wellness Visit but the instructions to patient state he will be due for an annual wellness visit around 8/7/2023-ok to wait to be seen then and do labs at that time, or office visit for diabetes follow up with labs soon?    Hemoglobin A1C   Date Value Ref Range Status   07/27/2022 6.5 (H) 0.0 - 5.6 % Final     Comment:     Normal <5.7%   Prediabetes 5.7-6.4%    Diabetes 6.5% or higher     Note: Adopted from ADA consensus guidelines.   04/25/2017 5.9 4.3 - 6.0 % Final     Per problem list, patient is diabetic and his A1C is due, due for foot and eye exam. He is not on medication for diabetes-routing to provider.    Claudia Day RN

## 2022-12-08 NOTE — TELEPHONE ENCOUNTER
Patient calling stating he received a [facility] message that he needs a physical and an A1c. Patient believed he had a physical 08/01/2022 when he saw Dr. Flower. Per OV notes, patient was sent to ER from this visit. Patient was seen for hospital follow up 08/08/22. Last fasting labs were from 07/27/2022. Patient is wanting to know why it states he is needing a physical and when he should be seen next. Patient also wants to know when he is due for labs.     Jhon GUTIERREZ RN 12/8/2022 at 1:13 PM

## 2022-12-09 NOTE — TELEPHONE ENCOUNTER
Will leave this for Madelyn on Monday, looks like Dr. Flower is off 2/6. Needs to reschedule.    Thank you  Johnson CAROLINA

## 2022-12-13 DIAGNOSIS — I48.19 PERSISTENT ATRIAL FIBRILLATION (H): ICD-10-CM

## 2022-12-13 DIAGNOSIS — Z79.01 LONG TERM CURRENT USE OF ANTICOAGULANTS WITH INR GOAL OF 2.5-3.5: ICD-10-CM

## 2022-12-13 DIAGNOSIS — Z95.2 S/P MITRAL VALVE REPLACEMENT: ICD-10-CM

## 2022-12-13 DIAGNOSIS — Z95.2 S/P AORTIC VALVE REPLACEMENT: ICD-10-CM

## 2022-12-14 NOTE — TELEPHONE ENCOUNTER
Routing refill request to anticoagulation team     INR is within goal in the past 6 weeks    Bárbara Sargent, Registered Nurse  [facility]

## 2022-12-15 RX ORDER — WARFARIN SODIUM 5 MG/1
TABLET ORAL
Qty: 120 TABLET | Refills: 1 | Status: SHIPPED | OUTPATIENT
Start: 2022-12-15 | End: 2023-05-10

## 2022-12-15 NOTE — TELEPHONE ENCOUNTER
ANTICOAGULATION MANAGEMENT:  Medication Refill    Anticoagulation Summary  As of 11/30/2022    Warfarin maintenance plan:  5 mg (5 mg x 1) every Sun, Wed; 7.5 mg (5 mg x 1.5) all other days; Starting 11/30/2022   Next INR check:  1/4/2023   Target end date:  Indefinite    Indications    S/P aortic valve replacement [Z95.2]  S/P mitral valve replacement [Z95.2]  Long term current use of anticoagulant therapy [Z79.01]             Anticoagulation Care Providers     Provider Role Specialty Phone number    Jodi Flower Mai, MD Referring Internal Medicine - Pediatrics [phone]          Visit with referring provider/group within last year: Yes    [facility] referral signed within last year: Yes    Fausto meets all criteria for refill (current [facility] referral, office visit with referring provider/group in last year, lab monitoring up to date or not exceeding 2 weeks overdue). Rx instructions and quantity match patient's current dosing plan. Warfarin 90 day supply with 1 refill granted per [facility] protocol     Ruthann Sanders RN  Anticoagulation Clinic

## 2022-12-21 ENCOUNTER — VIRTUAL VISIT (OUTPATIENT)
Dept: CARDIOLOGY | Facility: CLINIC | Age: 82
End: 2022-12-21
Attending: PHYSICIAN ASSISTANT
Payer: MEDICARE

## 2022-12-21 DIAGNOSIS — I44.2 THIRD DEGREE HEART BLOCK BY ELECTROCARDIOGRAM (H): ICD-10-CM

## 2022-12-21 PROCEDURE — 99442 PR PHYSICIAN TELEPHONE EVALUATION 11-20 MIN: CPT | Mod: 95 | Performed by: INTERNAL MEDICINE

## 2022-12-21 NOTE — LETTER
12/21/2022    Chris Flower MD  [address] [place] [address]    RE: Fausto Farr       Dear Colleague,     I had the pleasure of seeing Fausto Farr in the [facility].  Ralph is a 82 year old who is being evaluated via a billable telephone visit.      What phone number would you like to be contacted at? [phone]  How would you like to obtain your AVS? Mail a copy    Distant Location (provider location):  On-site  Phone call duration: 17 minutes      Service Date: 12/21/2022    CARDIOLOGY TELEPHONE VISIT     OFFICE NOTE:  This is a telephone visit on Fausto Farr.  He is an 82-year-old gentleman.  His wife was in the background. This is a telephone visit only, so obviously hampered by that. The patient was seen by my nurse practitioner on 09/20/2022. We talked to the patient. He was sent to the emergency room.  He told me because he went to the doctor's office, his pulse was noted to be slow, but she noted it was really a visual disturbance.  He was noted to be in complete AV block and had evidence of a left occipital infarct.  He was seen by Neurology.  Although his INR was slightly low at 2.3, he does have 2 mechanical valves.  He had an MRI-safe dual-chamber pacemaker placed on 08/02.  Today I reviewed the last note I have available. There has been no atrial arrhythmias.  He does have a previous history of atrial fib and atrial flutter.  He has had some ablation.  He has nonsustained V tach.  They did see nonsustained V tach on the pacer.  The pacer is now working well.  He is pacing the ventricle about 90% of the time.  He is due for another pacer check in about 2 days.  He reports his vision has returned to normal.  His home blood pressure today is 112/58 with a heart rate of 66.  We want to make sure the blood pressure is not too good.  That is actually low for him.  He did have blood work before this occurred with a lipid panel.  He has a low HDL, but good  triglycerides and good LDL.  He states he is doing well, and his wife who was on the background agreed.  I always feel a little hampered on a telephone visit.  He has a lot of heart issues, and so I am going to have him come back in 6 months.  I did review with him his last echocardiogram, which was done on 2022.  He reports normal LV function.  LVH is noted.  He has mild pulmonary hypertension.  His mechanical mitral valve and mechanical aortic valve are all working well.  Left atrium was mildly dilated, which may be important in a patient with atrial arrhythmias.  Right ventricular systolic function was preserved, even though he had some mild pulmonary hypertension.  At this point, I made no changes.  He will have his Pacer Clinic in 2 days.  I am going to see him in 6 months.  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 Osborne for that.  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  He overall thinks he is doing well.    Today's visit was a telephone visit starting at 9:28 a.m., and I completed this note at 9:45 a.m. for a 17 minute phone visit.    cc:  Jodi Flower MD   [facility]  [address] [place] [address]    Calderon Santo MD    D: 2022   T: 2022   MT: rashida    Name:     LIANG VAUGHN  MRN:      [id number]        Account:      [id number]   :      1940           Service Date: 2022       Document: [id number]      Thank you for allowing me to participate in the care of your patient.      Sincerely,     Calderon Santo MD     [facility]  cc:   Paige Busch PA-C  [address] [place]  [place] [address]

## 2022-12-21 NOTE — PROGRESS NOTES
Ralph is a 82 year old who is being evaluated via a billable telephone visit.      What phone number would you like to be contacted at? [phone]  How would you like to obtain your AVS? Mail a copy    Distant Location (provider location):  On-site  Phone call duration: 17 minutes

## 2022-12-21 NOTE — PROGRESS NOTES
Service Date: 12/21/2022    CARDIOLOGY TELEPHONE VISIT     OFFICE NOTE:  This is a telephone visit on Fausto Farr.  He is an 82-year-old gentleman.  His wife was in the background. This is a telephone visit only, so obviously hampered by that. The patient was seen by my nurse practitioner on 09/20/2022. We talked to the patient. He was sent to the emergency room.  He told me because he went to the doctor's office, his pulse was noted to be slow, but she noted it was really a visual disturbance.  He was noted to be in complete AV block and had evidence of a left occipital infarct.  He was seen by Neurology.  Although his INR was slightly low at 2.3, he does have 2 mechanical valves.  He had an MRI-safe dual-chamber pacemaker placed on 08/02.  Today I reviewed the last note I have available. There has been no atrial arrhythmias.  He does have a previous history of atrial fib and atrial flutter.  He has had some ablation.  He has nonsustained V tach.  They did see nonsustained V tach on the pacer.  The pacer is now working well.  He is pacing the ventricle about 90% of the time.  He is due for another pacer check in about 2 days.  He reports his vision has returned to normal.  His home blood pressure today is 112/58 with a heart rate of 66.  We want to make sure the blood pressure is not too good.  That is actually low for him.  He did have blood work before this occurred with a lipid panel.  He has a low HDL, but good triglycerides and good LDL.  He states he is doing well, and his wife who was on the background agreed.  I always feel a little hampered on a telephone visit.  He has a lot of heart issues, and so I am going to have him come back in 6 months.  I did review with him his last echocardiogram, which was done on 08/03/2022.  He reports normal LV function.  LVH is noted.  He has mild pulmonary hypertension.  His mechanical mitral valve and mechanical aortic valve are all working well.  Left atrium was mildly  dilated, which may be important in a patient with atrial arrhythmias.  Right ventricular systolic function was preserved, even though he had some mild pulmonary hypertension.  At this point, I made no changes.  He will have his Pacer Clinic in 2 days.  I am going to see him in 6 months.  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 Osborne for that.  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  He overall thinks he is doing well.    Today's visit was a telephone visit starting at 9:28 a.m., and I completed this note at 9:45 a.m. for a 17 minute phone visit.    cc:  [place] [address]    Calderon Santo MD        D: 2022   T: 2022   MT: rashida    Name:     LIANG VAUGHN  MRN:      [id number]        Account:      [id number]   :      1940           Service Date: 2022       Document: [id number]

## 2022-12-23 ENCOUNTER — ANCILLARY PROCEDURE (OUTPATIENT)
Dept: CARDIOLOGY | Facility: CLINIC | Age: 82
End: 2022-12-23
Attending: INTERNAL MEDICINE
Payer: MEDICARE

## 2022-12-23 DIAGNOSIS — I44.2 COMPLETE ATRIOVENTRICULAR BLOCK (H): ICD-10-CM

## 2022-12-23 DIAGNOSIS — Z95.0 CARDIAC PACEMAKER IN SITU: ICD-10-CM

## 2022-12-23 PROCEDURE — 93294 REM INTERROG EVL PM/LDLS PM: CPT | Performed by: INTERNAL MEDICINE

## 2022-12-23 PROCEDURE — 93296 REM INTERROG EVL PM/IDS: CPT | Performed by: INTERNAL MEDICINE

## 2022-12-24 LAB
MDC_IDC_EPISODE_DTM: NORMAL
MDC_IDC_EPISODE_ID: NORMAL
MDC_IDC_EPISODE_TYPE: NORMAL
MDC_IDC_LEAD_IMPLANT_DT: NORMAL
MDC_IDC_LEAD_IMPLANT_DT: NORMAL
MDC_IDC_LEAD_LOCATION: NORMAL
MDC_IDC_LEAD_LOCATION: NORMAL
MDC_IDC_LEAD_LOCATION_DETAIL_1: NORMAL
MDC_IDC_LEAD_LOCATION_DETAIL_1: NORMAL
MDC_IDC_LEAD_MFG: NORMAL
MDC_IDC_LEAD_MFG: NORMAL
MDC_IDC_LEAD_MODEL: NORMAL
MDC_IDC_LEAD_MODEL: NORMAL
MDC_IDC_LEAD_POLARITY_TYPE: NORMAL
MDC_IDC_LEAD_POLARITY_TYPE: NORMAL
MDC_IDC_LEAD_SERIAL: NORMAL
MDC_IDC_LEAD_SERIAL: NORMAL
MDC_IDC_MSMT_BATTERY_DTM: NORMAL
MDC_IDC_MSMT_BATTERY_REMAINING_LONGEVITY: 162 MO
MDC_IDC_MSMT_BATTERY_REMAINING_PERCENTAGE: 100 %
MDC_IDC_MSMT_BATTERY_STATUS: NORMAL
MDC_IDC_MSMT_LEADCHNL_RA_IMPEDANCE_VALUE: 681 OHM
MDC_IDC_MSMT_LEADCHNL_RA_PACING_THRESHOLD_AMPLITUDE: 0.5 V
MDC_IDC_MSMT_LEADCHNL_RA_PACING_THRESHOLD_PULSEWIDTH: 0.4 MS
MDC_IDC_MSMT_LEADCHNL_RV_IMPEDANCE_VALUE: 589 OHM
MDC_IDC_MSMT_LEADCHNL_RV_PACING_THRESHOLD_AMPLITUDE: 0.9 V
MDC_IDC_MSMT_LEADCHNL_RV_PACING_THRESHOLD_PULSEWIDTH: 0.4 MS
MDC_IDC_PG_IMPLANT_DTM: NORMAL
MDC_IDC_PG_MFG: NORMAL
MDC_IDC_PG_MODEL: NORMAL
MDC_IDC_PG_SERIAL: NORMAL
MDC_IDC_PG_TYPE: NORMAL
MDC_IDC_SESS_CLINIC_NAME: NORMAL
MDC_IDC_SESS_DTM: NORMAL
MDC_IDC_SESS_TYPE: NORMAL
MDC_IDC_SET_BRADY_AT_MODE_SWITCH_MODE: NORMAL
MDC_IDC_SET_BRADY_AT_MODE_SWITCH_RATE: 170 {BEATS}/MIN
MDC_IDC_SET_BRADY_LOWRATE: 60 {BEATS}/MIN
MDC_IDC_SET_BRADY_MAX_SENSOR_RATE: 130 {BEATS}/MIN
MDC_IDC_SET_BRADY_MAX_TRACKING_RATE: 130 {BEATS}/MIN
MDC_IDC_SET_BRADY_MODE: NORMAL
MDC_IDC_SET_BRADY_PAV_DELAY_HIGH: 200 MS
MDC_IDC_SET_BRADY_PAV_DELAY_LOW: 250 MS
MDC_IDC_SET_BRADY_SAV_DELAY_HIGH: 200 MS
MDC_IDC_SET_BRADY_SAV_DELAY_LOW: 250 MS
MDC_IDC_SET_LEADCHNL_RA_PACING_AMPLITUDE: 2 V
MDC_IDC_SET_LEADCHNL_RA_PACING_CAPTURE_MODE: NORMAL
MDC_IDC_SET_LEADCHNL_RA_PACING_POLARITY: NORMAL
MDC_IDC_SET_LEADCHNL_RA_PACING_PULSEWIDTH: 0.4 MS
MDC_IDC_SET_LEADCHNL_RA_SENSING_ADAPTATION_MODE: NORMAL
MDC_IDC_SET_LEADCHNL_RA_SENSING_POLARITY: NORMAL
MDC_IDC_SET_LEADCHNL_RA_SENSING_SENSITIVITY: 0.25 MV
MDC_IDC_SET_LEADCHNL_RV_PACING_AMPLITUDE: 1.3 V
MDC_IDC_SET_LEADCHNL_RV_PACING_CAPTURE_MODE: NORMAL
MDC_IDC_SET_LEADCHNL_RV_PACING_POLARITY: NORMAL
MDC_IDC_SET_LEADCHNL_RV_PACING_PULSEWIDTH: 0.4 MS
MDC_IDC_SET_LEADCHNL_RV_SENSING_ADAPTATION_MODE: NORMAL
MDC_IDC_SET_LEADCHNL_RV_SENSING_POLARITY: NORMAL
MDC_IDC_SET_LEADCHNL_RV_SENSING_SENSITIVITY: 0.6 MV
MDC_IDC_SET_ZONE_DETECTION_INTERVAL: 375 MS
MDC_IDC_SET_ZONE_TYPE: NORMAL
MDC_IDC_SET_ZONE_VENDOR_TYPE: NORMAL
MDC_IDC_STAT_AT_BURDEN_PERCENT: 1 %
MDC_IDC_STAT_AT_DTM_END: NORMAL
MDC_IDC_STAT_AT_DTM_START: NORMAL
MDC_IDC_STAT_BRADY_DTM_END: NORMAL
MDC_IDC_STAT_BRADY_DTM_START: NORMAL
MDC_IDC_STAT_BRADY_RA_PERCENT_PACED: 16 %
MDC_IDC_STAT_BRADY_RV_PERCENT_PACED: 86 %
MDC_IDC_STAT_EPISODE_RECENT_COUNT: 0
MDC_IDC_STAT_EPISODE_RECENT_COUNT: 1
MDC_IDC_STAT_EPISODE_RECENT_COUNT: 4
MDC_IDC_STAT_EPISODE_RECENT_COUNT_DTM_END: NORMAL
MDC_IDC_STAT_EPISODE_RECENT_COUNT_DTM_START: NORMAL
MDC_IDC_STAT_EPISODE_TYPE: NORMAL
MDC_IDC_STAT_EPISODE_VENDOR_TYPE: NORMAL

## 2022-12-27 ENCOUNTER — PATIENT OUTREACH (OUTPATIENT)
Dept: PEDIATRICS | Facility: CLINIC | Age: 82
End: 2022-12-27

## 2022-12-27 NOTE — LETTER
[facility]  [address]  [place] [address]    Amaricatherine Rosas,     Thank you for choosing [facility] today for your health care needs.     [facility] is transforming primary care  At [facility], we re dedicated to constantly improve how we serve the health care needs of our patients and communities. We re currently making changes to the way we deliver care.     Changes you ll notice include:    An emphasis on building a relationship with a primary care provider    Access to a PAL (patient advocate and liaison) to help guide you with your care needs    Appointment lengths tailored to your specific needs and greater access to a care team to help you and your provider improve and maintain your health and well-being    Improved online access to your care team    Benefits of a primary care provider  If you don t have a designated primary care provider, we encourage you to get to know our care team online and find a provider you d like to see. Most of our providers have a short video on their online provider page. Visit [email or web address] to explore our providers and locations.    Benefits of having a primary care provider include:      They get to know you - your health history, family history and goals, making it easier to make a health plan together.     You get to know them - making health-related conversations and decisions easier      Primary care doctors help you when you re sick or hurt - but also focus on keeping you healthy with preventive care and screenings.      A doctor who sees you regularly is more likely to notice changes in your health.     You ll be connected to a broad care team who partners with your provider to support you.    Patient Advocate Liaison (PAL)   To help make sure you get the right care, at the right time, we include PALs, or Patient Advocate Liaisons, as part of your care team. Your PAL will be your first line of contact. They ll advocate for your needs and help you  navigate our services, connecting you with care team members and community resources to ensure your care is well coordinated. You ll be introduced to a PAL in an upcoming visit.     Expanded care team access with tailored appointment lengths  Depending on your health care needs, you may have longer or shorter appointments and see additional care team providers - including Medication Therapy Management (MTM) pharmacists, diabetes educators, behavioral health clinicians, or social workers. At times, they may be included in your visit with your provider, or you may see them individually.     Online access to your health care records and care team  [employer] is our online tool that makes it easy to see your health care information and communicate with your care team.     [employer] allows you to:     View your health maintenance plan so you know when you re due for a preventive screening    Send secure messages to your care team    View your health history and visit summaries     Schedule appointments     Complete questionnaires and eCheck-in before appointments      Get care from your provider with an e-visit      View and pay your bill     Sign up at [employer]. Once you have an account, you also can download the mobile gretta.     Connecting to fast and convenient care  When you need fast, convenient care - consider one of the following options:       Video Visit: A convenient care option for visiting with your provider out of the comfort of your own home. Most of the things you come to the clinic to address with your provider can now be done virtually through a video. This includes your chronic medication follow up, questions or concerns you may have, and even your annual Medicare Wellness Visit.       Phone Visit: Another convenient option for follow up of common problems that may require a more in-depth discussion with your provider.       E-visit: When you need acute care quickly, or have a quick question about  your medication, an E-visit is completed through [employer] and your provider will respond within one business day.      Lluvia TOLEDO RN   Patient Advocate Liaison (PAL)  [facility]   [phone]

## 2022-12-27 NOTE — TELEPHONE ENCOUNTER
SB3 Welcome Letter sent.     Lluvia TOLEDO RN   Patient Advocate Liaison (PAL)  [facility]  [phone]

## 2022-12-28 ENCOUNTER — TELEPHONE (OUTPATIENT)
Dept: CARDIOLOGY | Facility: CLINIC | Age: 82
End: 2022-12-28

## 2022-12-28 NOTE — TELEPHONE ENCOUNTER
Per wife Pt received a letter from [employer] informing them Pt is to have med increase of Metoprolol to 75 mg a day. Per wife and Pt he is already on this. Informed wife to then disregard letter. MAHSA Rich RN

## 2022-12-28 NOTE — TELEPHONE ENCOUNTER
Patient called left message requesting call back regarding medication question.   Last seen by Dr. Santo 12-21-22.     Team messaged.

## 2023-01-03 ENCOUNTER — NURSE TRIAGE (OUTPATIENT)
Dept: PEDIATRICS | Facility: CLINIC | Age: 83
End: 2023-01-03

## 2023-01-03 NOTE — TELEPHONE ENCOUNTER
Situation:   - Received a transferred call from Triage RN   - Patient calling to report a fall from yesterday (1/2/2023)     Background:   - Patient states that he slipped on the ice outside of his house yesterday (1/2/2023) and fell on his right side (right arm, elbow, right hip, and lower back in pain and sore)   - Patient takes warfarin     warfarin ANTICOAGULANT (COUMADIN) 5 MG tablet 120 tablet 1 12/15/2022  No   Sig: Take 5mg every Sun & Wed / Take 7.5mg all other days OR per INR clinic     - Patient has an upcoming INR appointment tomorrow (1/4/2023)     Assessment:   - Patient reports that he has been taking Tylenol every 5-6 hours following the fall, reports that it has been somewhat effective in providing pain relief   - Patient reports lower back pain when moving around   - Patient denies a head strike from the fall   - Patient denies dizziness, shortness of breath, headaches   - Patient denies attempting the use of ice or heat to the areas of pain/soreness   - Patient denies bruising     Recommendation:   - Assisted the patient in scheduling an appointment with Jessica Mary PA-C tomorrow (1/4/2023) at 10 AM   - Informed the patient to call back with an update if he experiences new symptoms, such as headaches, dizziness, shortness of breath   - Patient verbalized understanding and agrees with the plan     Routing to Jessica Mary PA-C as an FYI.     Lluvia TOLEDO RN   Patient Advocate Liaison (PAL)  [facility]   [phone]

## 2023-01-03 NOTE — TELEPHONE ENCOUNTER
"S-(situation): Patient calling regarding fall yesterday.     B-(background): Patient slipped on ice yesterday, fell on right side.     A-(assessment): Patient fell on right elbow, right shoulder. Patient also experiencing lower right back pain. Denies bruising. Pain level if sitting in comfortable chair, 0/10. If up and moving around, rated as 5/10. If turning body or making coffee, (twisting motion). Has been taking tylenol and \"taking it easy\".     R-(recommendations): Transferred to [facility] for further discussion.     Jhon GUTIERREZ RN 1/3/2023 at 11:51 AM    "

## 2023-01-04 ENCOUNTER — ANTICOAGULATION THERAPY VISIT (OUTPATIENT)
Dept: ANTICOAGULATION | Facility: CLINIC | Age: 83
End: 2023-01-04

## 2023-01-04 ENCOUNTER — ANCILLARY PROCEDURE (OUTPATIENT)
Dept: GENERAL RADIOLOGY | Facility: CLINIC | Age: 83
End: 2023-01-04
Attending: PHYSICIAN ASSISTANT
Payer: MEDICARE

## 2023-01-04 ENCOUNTER — OFFICE VISIT (OUTPATIENT)
Dept: URGENT CARE | Facility: URGENT CARE | Age: 83
End: 2023-01-04
Payer: MEDICARE

## 2023-01-04 ENCOUNTER — LAB (OUTPATIENT)
Dept: LAB | Facility: CLINIC | Age: 83
End: 2023-01-04
Payer: MEDICARE

## 2023-01-04 VITALS
DIASTOLIC BLOOD PRESSURE: 77 MMHG | HEART RATE: 83 BPM | SYSTOLIC BLOOD PRESSURE: 145 MMHG | OXYGEN SATURATION: 97 % | TEMPERATURE: 98.1 F

## 2023-01-04 DIAGNOSIS — Z79.01 LONG TERM CURRENT USE OF ANTICOAGULANT THERAPY: ICD-10-CM

## 2023-01-04 DIAGNOSIS — M54.50 ACUTE RIGHT-SIDED LOW BACK PAIN WITHOUT SCIATICA: ICD-10-CM

## 2023-01-04 DIAGNOSIS — Z95.2 S/P AORTIC VALVE REPLACEMENT: Primary | ICD-10-CM

## 2023-01-04 DIAGNOSIS — Z95.2 S/P MITRAL VALVE REPLACEMENT: ICD-10-CM

## 2023-01-04 DIAGNOSIS — M25.511 ACUTE PAIN OF RIGHT SHOULDER: Primary | ICD-10-CM

## 2023-01-04 DIAGNOSIS — I48.19 PERSISTENT ATRIAL FIBRILLATION (H): ICD-10-CM

## 2023-01-04 DIAGNOSIS — M25.511 ACUTE PAIN OF RIGHT SHOULDER: ICD-10-CM

## 2023-01-04 DIAGNOSIS — Z95.2 S/P AORTIC VALVE REPLACEMENT: ICD-10-CM

## 2023-01-04 LAB — INR BLD: 4 (ref 0.9–1.1)

## 2023-01-04 PROCEDURE — 73030 X-RAY EXAM OF SHOULDER: CPT | Mod: TC | Performed by: RADIOLOGY

## 2023-01-04 PROCEDURE — 99214 OFFICE O/P EST MOD 30 MIN: CPT | Performed by: PHYSICIAN ASSISTANT

## 2023-01-04 PROCEDURE — 72100 X-RAY EXAM L-S SPINE 2/3 VWS: CPT | Mod: TC | Performed by: RADIOLOGY

## 2023-01-04 PROCEDURE — 85610 PROTHROMBIN TIME: CPT

## 2023-01-04 PROCEDURE — 36415 COLL VENOUS BLD VENIPUNCTURE: CPT

## 2023-01-04 RX ORDER — HYDROCODONE BITARTRATE AND ACETAMINOPHEN 5; 325 MG/1; MG/1
1 TABLET ORAL EVERY 6 HOURS PRN
Qty: 6 TABLET | Refills: 0 | Status: ON HOLD | OUTPATIENT
Start: 2023-01-04 | End: 2023-01-10

## 2023-01-04 NOTE — PROGRESS NOTES
Assessment & Plan     1. Acute pain of right shoulder  I suspect rotator cuff full or partial tear.  He is neurovascularly intact. No acute abnormality per my read. He has painful arc test, positive drop arm test and weakness with rotation, flexion extension, abduction etc.  He was given a sling to wear.  Encouraged to take off the sling 3 times a day, do arm circles.  Encouraged him to use ice on the area.  He was given a short supply of Norco for severe pain.  Discussed black box warning with this medication, that it may cause drowsiness, not to drink or do drive while taking the medication.  Referral to orthopedics to follow-up within the next 1 to 3 days for recheck.  HE will continue to be taking Tylenol, INR will adjust warfarin accordingly.   - XR Shoulder Right G/E 3 Views; Future    2. Acute right-sided low back pain without sciatica  - XR Lumbar Spine 2/3 Views; Future        Return in about 1 day (around 1/5/2023) for Orthopedics.    Diagnosis and treatment plan was reviewed with patient and/or family.   We went over any labs or imaging. Discussed worsening symptoms or little to no relief despite treatment plan to follow-up with PCP or return to clinic.  Patient verbalizes understanding. All questions were addressed and answered.     Rochelle Trejo PA-C  [facility]    CHIEF COMPLAINT:   Chief Complaint   Patient presents with     Fall     Fell and hurt right shoulder and hit, has a sore lower back on the right side. Took the fall on Monday afternoon on ice, Has been taking tylenol but not really helping.      Eric Rosas is a 82 year old left handed male who presents to clinic today for evaluation of right shoulder pain and right sided low back pain.  He had a fall on January 2, fell landing on his right side.  He did not hit his head, have loss of consciousness or any episodes of vomiting.  Does not have a headache.  Feeling pain at the anterior aspect of his right  shoulder, and has had pain with movement.  Endorses decreased ROM. He denies having fever, chills, numbness, tingling, pale or cold extremity.  He has been taking Tylenol for his symptoms without relief.      Additionally complains of right-sided lower back pain. Pain is worse with movement. Patient denies fever, chills, fatigue, weight loss, fecal or urinary incontinence, lower extremity numbness or tingling, or lower extremity weakness.    Takes Warfarin for Aortic valve replacement, INR today at 4.0.       Past Medical History:   Diagnosis Date     Abdominal pain      Anemia     currently taking iron     Back pain     since 1980     BPH (benign prostatic hyperplasia)      Bruit      CAD (coronary artery disease)      Cellulitis 10/18/2012     Cellulitis 05/2018    GrpB strep LLE cellulitis  negative RACHAEL for veg     Chronic venous insufficiency     bilat lower extremities     Contact dermatitis and other eczema, due to unspecified cause      Diaphragmatic hernia without mention of obstruction or gangrene      Diastolic HF (heart failure) (H)      Gastric ulcer      Hypertension 08/06/2013     Lumbago      Mesenteric artery insufficiency (H)     known AAA and narrowing of intestinal artery's  followed by dr raymond     Metabolic syndrome      Mitral valve disease     s/p mechanical MVR, prior mech AVR     Nonallopathic lesion of lumbar region      Nonallopathic lesion of rib cage      Nonallopathic lesion of sacral region      GAGE (obstructive sleep apnea)     CPAP     Paroxysmal atrial fibrillation (H) 10/18/2012    occured after stopping BB  (prior  aflutter ablation)     Prostate cancer (H) 2008    radiation seed, XRT      PVD (peripheral vascular disease) (H)      Rotator cuff strain     and sprain     S/P AAA repair      S/P aortic valve replacement 2006    developed perivalve leak and MS, therefore redo surg 2013     S/P CABG (coronary artery bypass graft) 2006    Lima-Lad, RA-Rca     Sciatica of left side      since 2000     Sepsis due to group B Streptococcus (H) 05/19/2018     TIA (transient ischemic attack) 8/1/2022     Total knee replacement status 7/22/2019     Ulcerative colitis (H)      Varicose veins of bilateral lower extremities with other complications     s/p RLE vein stripping     Vitamin D deficiency      Past Surgical History:   Procedure Laterality Date     AORTIC VALVE REPLACEMENT  1/3/06    redo AVR SJM 21mm and SJM MVR 27mm in 2013SJM 21([id number]):AVR, SJM 27 :MVR-     APPLY WOUND VAC N/A 11/12/2019    Procedure: APPLICATION, WOUND VAC;  Surgeon: Sara Martinez MD;  Location:  OR     ARTHROPLASTY KNEE      right knee     ARTHROPLASTY KNEE Right 7/22/2019    Procedure: Right total knee arthroplasty;  Surgeon: Prasanth Jensen MD;  Location:  OR     BACK SURGERY  Oct 2015    Fusion L4-5, laminectomy L2, L3     BYPASS GRAFT ARTERY CORONARY  10/2013    reimplantation of radial artery graft to RCA     CARDIAC CATHERIZATION  11/2005    Stent placed to RCA     CARDIAC CATHERIZATION  04/2013    Occluded RCA, patent LIMA to LAD and radial graft to PDA     CARPAL TUNNEL RELEASE RT/LT  1994     COLONOSCOPY  8-22-11     CYSTOSCOPY FLEXIBLE  10/16/2013    Procedure: CYSTOSCOPY FLEXIBLE;  FLEXIBLE CYSTOSCOPY / DILATION OF URETHRA / INSERTION OF LESLIE;  Surgeon: Cooper Wallace MD;  Location:  OR     ENDOVASCULAR REPAIR, INFRARENAL ABDOMINAL AORTIC ANEURYSM/DISSECTION; MODULAR BIFURCATED PROSTHESIS  2006    AAA repair endovascular     ENT SURGERY       EP ABLATION ATRIAL FLUTTER N/A 4/22/2019    Procedure: EP Ablation Atrial Flutter;  Surgeon: Jessy Vasquez MD;  Location:  HEART CARDI[facility] CATH LAB     EP PACEMAKER DEVICE & LEAD IMPLANT- RIGHT ATRIAL & RIGHT VENTRICULAR N/A 8/2/2022    Procedure: Pacemaker Device & Lead Implant - Right Atrial & Right Ventricular;  Surgeon: Jessy Vasquez MD;  Location:  HEART CARDIAC CATH LAB     GENITOURINARY SURGERY   6/16/08    radioactive seeding     GRAFT FLAP PEDICLE EXTREMITY (LOCATION) Right 11/12/2019    Procedure: SURGICAL PROCUREMENT, FLAP, PEDICLE, EXTREMITY;  Surgeon: Sara Martinez MD;  Location: [facility] OR     GRAFT SKIN SPLIT THICKNESS FROM EXTREMITY Right 11/12/2019    Procedure: RIGHT GASTRONEMIUS FLAP TO RIGHT KNEE, SPLIT THICKNESS SKIN GRAFT FROM RIGHT THIGH TO RIGHT KNEE, SURGICAL PROCUREMENT, FLAP, PEDICLE, EXTREMITY, WOUND VAC PLACEMENT;  Surgeon: Sara Martinez MD;  Location:  OR     HEAD & NECK SURGERY  1997    vocal cord polypectomy     INCISION AND DRAINAGE KNEE, COMBINED Right 8/29/2019    Procedure: INCISION AND DRAINAGE, KNEE W/ Secondary Wound Closure;  Surgeon: Prasanth Jensen MD;  Location: [facility] OR     IRRIGATION AND DEBRIDEMENT KNEE, PLACE ANTIBIOTIC CEMENT BEADS / SPACE Right 2/12/2020    Procedure: 1. Irrigation and debridement of wound breakdown, right total knee arthroplasty.  2. Irrigation and debridement of right total knee with placement of antibiotic-impregnated (vancomycin) absorbable antibiotic beads.;  Surgeon: Prasanth Jensen MD;  Location: [facility] OR     IRRIGATION AND DEBRIDEMENT LOWER EXTREMITY, COMBINED Right 12/8/2019    Procedure: DEBRIDEMENT OF RIGHT CALF AND WOUND CLOSURE.;  Surgeon: Sara Martinez MD;  Location:  OR     KNEE SURGERY  2001 Right knee arthroscopy     OPTICAL TRACKING SYSTEM FUSION SPINE POSTERIOR LUMBAR THREE+ LEVELS N/A 10/29/2015    Procedure: OPTICAL TRACKING SYSTEM FUSION SPINE POSTERIOR LUMBAR THREE+ LEVELS;  Surgeon: Walt Garcia MD;  Location:  OR     PROSTATE SURGERY      radioactive seeding 6/16/08     REPAIR ANEURYSM ABDOMINAL AORTA  06/08     REPAIR VALVE MITRAL  10/16/2013    SJM 21([id number]):AVR, SJM 27 :MVRProcedure: REPAIR VALVE MITRAL;  REDO STERNOTOMY/REDO AORTIC VALVE REPLACEMENT/ MITRAL VALVE REPLACEMENT/REIMPLANTATION OF RIGHT CORONARY ARTERY BYPASS WITH RACHAEL ( ON PUMP);  Surgeon: Viet Singh MD;   Location: [facility] OR     REPLACE VALVE AORTIC  10/16/2013    Procedure: REPLACE VALVE AORTIC;;  Surgeon: Viet Singh MD;  Location: [facility] OR     SURGERY GENERAL IP CONSULT  2008 Excision aneurysm abdominal aorta     SURGERY GENERAL IP CONSULT   Vocal cord polypectomy     VASCULAR SURGERY  1993     varicose vein stripping     ZZC CABG, VEIN, TWO  1/3/06    Left radial to RCA, LIMA to LAD (RA to RCA reimplanted at time of 2013 surg)     Social History     Tobacco Use     Smoking status: Former     Packs/day: 1.00     Years: 40.00     Pack years: 40.00     Types: Cigarettes     Start date: 4/15/1962     Quit date: 10/23/2002     Years since quittin.2     Smokeless tobacco: Never   Substance Use Topics     Alcohol use: Yes     Comment: a couple beers per week (socially)     Current Outpatient Medications   Medication     HYDROcodone-acetaminophen (NORCO) 5-325 MG tablet     acetaminophen (TYLENOL) 500 MG tablet     amoxicillin-clavulanate (AUGMENTIN) 875-125 MG tablet     betamethasone valerate (VALISONE) 0.1 % external lotion     cholecalciferol 25 MCG (1000 UT) TABS     ezetimibe (ZETIA) 10 MG tablet     ferrous sulfate (FEROSUL) 325 (65 Fe) MG tablet     fluocinonide (LIDEX) 0.05 % external ointment     glucosamine-chondroitin 500-400 MG CAPS per capsule     mesalamine (ASACOL HD) 800 MG EC tablet     metoprolol succinate ER (TOPROL XL) 50 MG 24 hr tablet     rosuvastatin (CRESTOR) 40 MG tablet     tamsulosin (FLOMAX) 0.4 MG capsule     triamcinolone (KENALOG) 0.1 % external cream     triamcinolone (KENALOG) 0.1 % external lotion     warfarin ANTICOAGULANT (COUMADIN) 5 MG tablet     No current facility-administered medications for this visit.     Allergies   Allergen Reactions     Bees Anaphylaxis       10 point ROS of systems were all negative except for pertinent positives noted in my HPI.      Exam:     BP (!) 145/77   Pulse 83   Temp 98.1  F (36.7  C)   SpO2 97%   Gen: healthy,alert,no  distress  Extremity: Right shoulder has TTP at the anterior aspect. NO bruising, swelling or erythema noted. He has decreased ROM in all directions.   FROM in shoulder and wrist. + painful arc test, + drop arm test, + weakness.  There is not compromise to the distal circulation.  Pulses are +2 and CRT is brisk  GENERAL APPEARANCE: healthy, alert and no distress  EXTREMITIES: peripheral pulses normal  BACK : Right sided tenderness over SI joint. No midline tenderness. No tenderness over right hip. FROM in hip and LE on R side.   SKIN: no suspicious lesions or rashes  NEURO: Normal strength and tone, sensory exam grossly normal, mentation intact and speech normal    Results for orders placed or performed in visit on 01/04/23   XR Shoulder Right G/E 3 Views     Status: None    Narrative    Examination:  XR SHOULDER RIGHT G/E 3 VIEWS    Date:  1/4/2023 10:28 AM     Clinical Information: Fell two days ago, anterior shoulder pain; Acute  pain of right shoulder     Additional Information: none    Comparison: none        Impression    Impression:    1. Degenerative change at the right glenohumeral joint without  evidence for fracture or dislocation. Mild hypertrophic change at the  AC joint. Cardiac pacer and sternotomy wires.    HANNY PROCTOR MD         SYSTEM ID:  [id number]   Results for orders placed or performed in visit on 01/04/23   INR point of care     Status: Abnormal   Result Value Ref Range    INR 4.0 (H) 0.9 - 1.1    Narrative    This test is intended for monitoring Coumadin therapy. Results are not accurate in patients with prolonged INR due to factor deficiency.       XR R Shoulder -- no acute abnormality per my read, pending radiology report  XR Lumbar -- NO acute abnormality per my read, pending radiology report

## 2023-01-04 NOTE — PROGRESS NOTES
Called patient, he has not arrived home yet, spouse will have patient call INR nurse.  Cece Rios RN, BSN  Anticoagulation Clinic

## 2023-01-04 NOTE — PATIENT INSTRUCTIONS
I suspect you have a problem with your rotator cuff  Use the sling for comfort  Do not sleep in the sling    Use ice on the area  Please call the referral today for a follow-up appointment    I have prescribed you a pain medication. This is a narcotic and it can be addictive. Do not drive or drink alcohol while taking this medication. Also know that there is TYLENOL in the medication, the same as one pill of Tylenol (325) so do not take too much tylenol.

## 2023-01-04 NOTE — PROGRESS NOTES
ANTICOAGULATION MANAGEMENT     Fausto Farr 82 year old male is on warfarin with supratherapeutic INR result. (Goal INR 2.5-3.5)    Recent labs: (last 7 days)     01/04/23  0929   INR 4.0*       ASSESSMENT       Source(s): Chart Review and Patient/Caregiver Call       Warfarin doses taken: Warfarin taken as instructed    Diet: No new diet changes identified    New illness, injury, or hospitalization: Yes: Patient fell 1/2/23, sore right shoulder and right lower back, was seen in UC today, referral to Ortho for shoulder     Medication/supplement changes: Norco as needed use not expected to affect INR, but may increase risk of bleeding    Tylenol 650 mg (Arthritis strength) Patient reports taking 4 tablets daily since fall not expected to affect INR, but may increase risk of bleeding    Signs or symptoms of bleeding or clotting: Yes: bruising from fall on back    Previous INR: Therapeutic last 2(+) visits    Additional findings: Pain in lower right back and shoulder due to fall on 1/2/23       PLAN     Recommended plan for temporary change(s) affecting INR     Dosing Instructions: partial hold then continue your current warfarin dose with next INR in 2 weeks       Summary  As of 1/4/2023    Full warfarin instructions:  1/4: 2.5 mg; Otherwise 5 mg every Sun, Wed; 7.5 mg all other days   Next INR check:  1/18/2023             Telephone call with Ralph who verbalizes understanding and agrees to plan    Lab visit scheduled    Education provided:     Please call back if any changes to your diet, medications or how you've been taking warfarin    Contact [phone]  with any changes, questions or concerns.     Discussed daily amount for Tylenol and the Norco has to be counted into his daily use of Tylenol.    Plan made per [facility] anticoagulation protocol    Cece Rios, RN  Anticoagulation Clinic  1/4/2023    _______________________________________________________________________     Anticoagulation Episode Summary      Current INR goal:  2.5-3.5   TTR:  81.1 % (1 y)   Target end date:  Indefinite   Send INR reminders to:  SHANNA DOWELL    Indications    S/P aortic valve replacement [Z95.2]  S/P mitral valve replacement [Z95.2]  Long term current use of anticoagulant therapy [Z79.01]           Comments:  Per 9/20/22 Cardio Note strict INR goal of 2.5-3.5. If INR falls below 2.5 at any time, needs to be bridged with Lovenox. consent to leave DVM for medical information and scheduling         Anticoagulation Care Providers     Provider Role Specialty Phone number    Jodi Flower Mai, MD Referring Internal Medicine - Pediatrics [phone]

## 2023-01-06 ENCOUNTER — ANCILLARY PROCEDURE (OUTPATIENT)
Dept: GENERAL RADIOLOGY | Facility: CLINIC | Age: 83
End: 2023-01-06
Attending: PHYSICIAN ASSISTANT
Payer: MEDICARE

## 2023-01-06 ENCOUNTER — HOSPITAL ENCOUNTER (INPATIENT)
Facility: CLINIC | Age: 83
LOS: 4 days | Discharge: HOME | DRG: 511 | End: 2023-01-10
Attending: EMERGENCY MEDICINE | Admitting: INTERNAL MEDICINE
Payer: MEDICARE

## 2023-01-06 ENCOUNTER — OFFICE VISIT (OUTPATIENT)
Dept: URGENT CARE | Facility: URGENT CARE | Age: 83
End: 2023-01-06
Payer: MEDICARE

## 2023-01-06 VITALS
HEART RATE: 75 BPM | SYSTOLIC BLOOD PRESSURE: 118 MMHG | DIASTOLIC BLOOD PRESSURE: 71 MMHG | TEMPERATURE: 98.4 F | OXYGEN SATURATION: 94 %

## 2023-01-06 DIAGNOSIS — M25.511 ACUTE PAIN OF RIGHT SHOULDER: ICD-10-CM

## 2023-01-06 DIAGNOSIS — Z95.2 S/P MITRAL VALVE REPLACEMENT: ICD-10-CM

## 2023-01-06 DIAGNOSIS — Z79.01 ANTICOAGULATED ON COUMADIN: ICD-10-CM

## 2023-01-06 DIAGNOSIS — M00.9 PYOGENIC ARTHRITIS OF RIGHT ELBOW, DUE TO UNSPECIFIED ORGANISM (H): ICD-10-CM

## 2023-01-06 DIAGNOSIS — M00.9: Primary | ICD-10-CM

## 2023-01-06 DIAGNOSIS — M25.521 RIGHT ELBOW PAIN: ICD-10-CM

## 2023-01-06 DIAGNOSIS — M25.521 RIGHT ELBOW PAIN: Primary | ICD-10-CM

## 2023-01-06 LAB
ANION GAP SERPL CALCULATED.3IONS-SCNC: 14 MMOL/L (ref 7–15)
APPEARANCE FLD: ABNORMAL
BASOPHILS # BLD AUTO: 0 10E3/UL (ref 0–0.2)
BASOPHILS NFR BLD AUTO: 0 %
BUN SERPL-MCNC: 20.2 MG/DL (ref 8–23)
CALCIUM SERPL-MCNC: 9.2 MG/DL (ref 8.8–10.2)
CELL COUNT BODY FLUID SOURCE: ABNORMAL
CHLORIDE SERPL-SCNC: 99 MMOL/L (ref 98–107)
COLOR FLD: YELLOW
CREAT SERPL-MCNC: 0.95 MG/DL (ref 0.67–1.17)
CRP SERPL-MCNC: 178.71 MG/L
DEPRECATED HCO3 PLAS-SCNC: 24 MMOL/L (ref 22–29)
EOSINOPHIL # BLD AUTO: 0 10E3/UL (ref 0–0.7)
EOSINOPHIL NFR BLD AUTO: 0 %
ERYTHROCYTE [DISTWIDTH] IN BLOOD BY AUTOMATED COUNT: 12.9 % (ref 10–15)
ERYTHROCYTE [DISTWIDTH] IN BLOOD BY AUTOMATED COUNT: 13 % (ref 10–15)
ERYTHROCYTE [SEDIMENTATION RATE] IN BLOOD BY WESTERGREN METHOD: 30 MM/HR (ref 0–20)
GFR SERPL CREATININE-BSD FRML MDRD: 80 ML/MIN/1.73M2
GLUCOSE BODY FLUID SOURCE: NORMAL
GLUCOSE FLD-MCNC: 29 MG/DL
GLUCOSE SERPL-MCNC: 115 MG/DL (ref 70–99)
GRAM STAIN RESULT: NORMAL
GRAM STAIN RESULT: NORMAL
HCT VFR BLD AUTO: 35.4 % (ref 40–53)
HCT VFR BLD AUTO: 39.4 % (ref 40–53)
HGB BLD-MCNC: 11.4 G/DL (ref 13.3–17.7)
HGB BLD-MCNC: 12.6 G/DL (ref 13.3–17.7)
IMM GRANULOCYTES # BLD: 0.1 10E3/UL
IMM GRANULOCYTES NFR BLD: 0 %
INR PPP: 1.5 (ref 0.85–1.15)
LYMPHOCYTES # BLD AUTO: 1.2 10E3/UL (ref 0.8–5.3)
LYMPHOCYTES NFR BLD AUTO: 9 %
LYMPHOCYTES NFR FLD MANUAL: 2 %
MCH RBC QN AUTO: 29.8 PG (ref 26.5–33)
MCH RBC QN AUTO: 29.9 PG (ref 26.5–33)
MCHC RBC AUTO-ENTMCNC: 32 G/DL (ref 31.5–36.5)
MCHC RBC AUTO-ENTMCNC: 32.2 G/DL (ref 31.5–36.5)
MCV RBC AUTO: 93 FL (ref 78–100)
MCV RBC AUTO: 93 FL (ref 78–100)
MONOCYTES # BLD AUTO: 1.8 10E3/UL (ref 0–1.3)
MONOCYTES NFR BLD AUTO: 13 %
MONOS+MACROS NFR FLD MANUAL: 7 %
NEUTROPHILS # BLD AUTO: 10.7 10E3/UL (ref 1.6–8.3)
NEUTROPHILS NFR BLD AUTO: 78 %
NEUTS BAND NFR FLD MANUAL: 91 %
NRBC # BLD AUTO: 0 10E3/UL
NRBC BLD AUTO-RTO: 0 /100
PLATELET # BLD AUTO: 250 10E3/UL (ref 150–450)
PLATELET # BLD AUTO: 259 10E3/UL (ref 150–450)
POTASSIUM SERPL-SCNC: 4 MMOL/L (ref 3.4–5.3)
PROT FLD-MCNC: 5.2 G/DL
PROTEIN BODY FLUID SOURCE: NORMAL
RBC # BLD AUTO: 3.82 10E6/UL (ref 4.4–5.9)
RBC # BLD AUTO: 4.22 10E6/UL (ref 4.4–5.9)
SARS-COV-2 RNA RESP QL NAA+PROBE: NEGATIVE
SODIUM SERPL-SCNC: 137 MMOL/L (ref 136–145)
WBC # BLD AUTO: 13 10E3/UL (ref 4–11)
WBC # BLD AUTO: 13.8 10E3/UL (ref 4–11)
WBC # FLD AUTO: ABNORMAL /UL

## 2023-01-06 PROCEDURE — 99222 1ST HOSP IP/OBS MODERATE 55: CPT | Mod: AI | Performed by: INTERNAL MEDICINE

## 2023-01-06 PROCEDURE — 250N000011 HC RX IP 250 OP 636: Performed by: INTERNAL MEDICINE

## 2023-01-06 PROCEDURE — 120N000001 HC R&B MED SURG/OB

## 2023-01-06 PROCEDURE — 87040 BLOOD CULTURE FOR BACTERIA: CPT | Performed by: EMERGENCY MEDICINE

## 2023-01-06 PROCEDURE — 250N000009 HC RX 250: Performed by: EMERGENCY MEDICINE

## 2023-01-06 PROCEDURE — U0003 INFECTIOUS AGENT DETECTION BY NUCLEIC ACID (DNA OR RNA); SEVERE ACUTE RESPIRATORY SYNDROME CORONAVIRUS 2 (SARS-COV-2) (CORONAVIRUS DISEASE [COVID-19]), AMPLIFIED PROBE TECHNIQUE, MAKING USE OF HIGH THROUGHPUT TECHNOLOGIES AS DESCRIBED BY CMS-2020-01-R: HCPCS | Performed by: INTERNAL MEDICINE

## 2023-01-06 PROCEDURE — 258N000003 HC RX IP 258 OP 636: Performed by: EMERGENCY MEDICINE

## 2023-01-06 PROCEDURE — 36415 COLL VENOUS BLD VENIPUNCTURE: CPT | Performed by: EMERGENCY MEDICINE

## 2023-01-06 PROCEDURE — 85025 COMPLETE CBC W/AUTO DIFF WBC: CPT | Performed by: EMERGENCY MEDICINE

## 2023-01-06 PROCEDURE — 85027 COMPLETE CBC AUTOMATED: CPT | Performed by: INTERNAL MEDICINE

## 2023-01-06 PROCEDURE — 87205 SMEAR GRAM STAIN: CPT | Performed by: EMERGENCY MEDICINE

## 2023-01-06 PROCEDURE — 89050 BODY FLUID CELL COUNT: CPT | Performed by: EMERGENCY MEDICINE

## 2023-01-06 PROCEDURE — 86140 C-REACTIVE PROTEIN: CPT | Performed by: EMERGENCY MEDICINE

## 2023-01-06 PROCEDURE — 0R9L3ZX DRAINAGE OF RIGHT ELBOW JOINT, PERCUTANEOUS APPROACH, DIAGNOSTIC: ICD-10-PCS | Performed by: EMERGENCY MEDICINE

## 2023-01-06 PROCEDURE — 85610 PROTHROMBIN TIME: CPT | Performed by: EMERGENCY MEDICINE

## 2023-01-06 PROCEDURE — 250N000011 HC RX IP 250 OP 636: Performed by: EMERGENCY MEDICINE

## 2023-01-06 PROCEDURE — C9803 HOPD COVID-19 SPEC COLLECT: HCPCS

## 2023-01-06 PROCEDURE — 73080 X-RAY EXAM OF ELBOW: CPT | Mod: TC | Performed by: RADIOLOGY

## 2023-01-06 PROCEDURE — 99285 EMERGENCY DEPT VISIT HI MDM: CPT

## 2023-01-06 PROCEDURE — 84157 ASSAY OF PROTEIN OTHER: CPT | Performed by: EMERGENCY MEDICINE

## 2023-01-06 PROCEDURE — 80048 BASIC METABOLIC PNL TOTAL CA: CPT | Performed by: EMERGENCY MEDICINE

## 2023-01-06 PROCEDURE — 87070 CULTURE OTHR SPECIMN AEROBIC: CPT | Performed by: EMERGENCY MEDICINE

## 2023-01-06 PROCEDURE — 36415 COLL VENOUS BLD VENIPUNCTURE: CPT | Performed by: INTERNAL MEDICINE

## 2023-01-06 PROCEDURE — 82945 GLUCOSE OTHER FLUID: CPT | Performed by: EMERGENCY MEDICINE

## 2023-01-06 PROCEDURE — 99214 OFFICE O/P EST MOD 30 MIN: CPT | Performed by: PHYSICIAN ASSISTANT

## 2023-01-06 PROCEDURE — 96365 THER/PROPH/DIAG IV INF INIT: CPT

## 2023-01-06 PROCEDURE — 89060 EXAM SYNOVIAL FLUID CRYSTALS: CPT | Performed by: EMERGENCY MEDICINE

## 2023-01-06 PROCEDURE — 250N000013 HC RX MED GY IP 250 OP 250 PS 637: Performed by: EMERGENCY MEDICINE

## 2023-01-06 PROCEDURE — 85652 RBC SED RATE AUTOMATED: CPT | Performed by: EMERGENCY MEDICINE

## 2023-01-06 PROCEDURE — 20605 DRAIN/INJ JOINT/BURSA W/O US: CPT

## 2023-01-06 RX ORDER — LIDOCAINE HYDROCHLORIDE 10 MG/ML
INJECTION, SOLUTION EPIDURAL; INFILTRATION; INTRACAUDAL; PERINEURAL
Status: DISCONTINUED
Start: 2023-01-06 | End: 2023-01-06 | Stop reason: HOSPADM

## 2023-01-06 RX ORDER — ACETAMINOPHEN 650 MG/1
650 SUPPOSITORY RECTAL EVERY 6 HOURS PRN
Status: DISCONTINUED | OUTPATIENT
Start: 2023-01-06 | End: 2023-01-10 | Stop reason: HOSPADM

## 2023-01-06 RX ORDER — HYDROMORPHONE HCL IN WATER/PF 6 MG/30 ML
0.2 PATIENT CONTROLLED ANALGESIA SYRINGE INTRAVENOUS
Status: DISCONTINUED | OUTPATIENT
Start: 2023-01-06 | End: 2023-01-08

## 2023-01-06 RX ORDER — PROCHLORPERAZINE 25 MG
12.5 SUPPOSITORY, RECTAL RECTAL EVERY 12 HOURS PRN
Status: DISCONTINUED | OUTPATIENT
Start: 2023-01-06 | End: 2023-01-10 | Stop reason: HOSPADM

## 2023-01-06 RX ORDER — ACETAMINOPHEN 325 MG/1
650 TABLET ORAL EVERY 6 HOURS PRN
Status: DISCONTINUED | OUTPATIENT
Start: 2023-01-06 | End: 2023-01-10 | Stop reason: HOSPADM

## 2023-01-06 RX ORDER — HEPARIN SODIUM 10000 [USP'U]/100ML
0-5000 INJECTION, SOLUTION INTRAVENOUS CONTINUOUS
Status: DISPENSED | OUTPATIENT
Start: 2023-01-06 | End: 2023-01-07

## 2023-01-06 RX ORDER — HYDROMORPHONE HYDROCHLORIDE 1 MG/ML
0.3 INJECTION, SOLUTION INTRAMUSCULAR; INTRAVENOUS; SUBCUTANEOUS
Status: DISCONTINUED | OUTPATIENT
Start: 2023-01-06 | End: 2023-01-08

## 2023-01-06 RX ORDER — MESALAMINE 800 MG/1
800 TABLET, DELAYED RELEASE ORAL 2 TIMES DAILY
Status: DISCONTINUED | OUTPATIENT
Start: 2023-01-06 | End: 2023-01-10 | Stop reason: HOSPADM

## 2023-01-06 RX ORDER — OXYCODONE HYDROCHLORIDE 5 MG/1
5 TABLET ORAL EVERY 4 HOURS PRN
Status: DISCONTINUED | OUTPATIENT
Start: 2023-01-06 | End: 2023-01-10 | Stop reason: HOSPADM

## 2023-01-06 RX ORDER — CEFAZOLIN SODIUM 1 G/3ML
1 INJECTION, POWDER, FOR SOLUTION INTRAMUSCULAR; INTRAVENOUS EVERY 8 HOURS
Status: DISCONTINUED | OUTPATIENT
Start: 2023-01-07 | End: 2023-01-07

## 2023-01-06 RX ORDER — ROSUVASTATIN CALCIUM 20 MG/1
40 TABLET, COATED ORAL DAILY
Status: DISCONTINUED | OUTPATIENT
Start: 2023-01-07 | End: 2023-01-10 | Stop reason: HOSPADM

## 2023-01-06 RX ORDER — SODIUM CHLORIDE, SODIUM LACTATE, POTASSIUM CHLORIDE, CALCIUM CHLORIDE 600; 310; 30; 20 MG/100ML; MG/100ML; MG/100ML; MG/100ML
INJECTION, SOLUTION INTRAVENOUS CONTINUOUS
Status: DISCONTINUED | OUTPATIENT
Start: 2023-01-07 | End: 2023-01-07 | Stop reason: ALTCHOICE

## 2023-01-06 RX ORDER — PROCHLORPERAZINE MALEATE 5 MG
5 TABLET ORAL EVERY 6 HOURS PRN
Status: DISCONTINUED | OUTPATIENT
Start: 2023-01-06 | End: 2023-01-10 | Stop reason: HOSPADM

## 2023-01-06 RX ORDER — PHYTONADIONE 5 MG/1
5 TABLET ORAL ONCE
Status: DISCONTINUED | OUTPATIENT
Start: 2023-01-06 | End: 2023-01-06

## 2023-01-06 RX ORDER — CEFAZOLIN SODIUM 1 G/50ML
2500 SOLUTION INTRAVENOUS ONCE
Status: COMPLETED | OUTPATIENT
Start: 2023-01-06 | End: 2023-01-07

## 2023-01-06 RX ORDER — CALCIUM CARBONATE 500 MG/1
1000 TABLET, CHEWABLE ORAL 4 TIMES DAILY PRN
Status: DISCONTINUED | OUTPATIENT
Start: 2023-01-06 | End: 2023-01-10 | Stop reason: HOSPADM

## 2023-01-06 RX ORDER — ONDANSETRON 2 MG/ML
4 INJECTION INTRAMUSCULAR; INTRAVENOUS EVERY 6 HOURS PRN
Status: DISCONTINUED | OUTPATIENT
Start: 2023-01-06 | End: 2023-01-10 | Stop reason: HOSPADM

## 2023-01-06 RX ORDER — ONDANSETRON 4 MG/1
4 TABLET, ORALLY DISINTEGRATING ORAL EVERY 6 HOURS PRN
Status: DISCONTINUED | OUTPATIENT
Start: 2023-01-06 | End: 2023-01-10 | Stop reason: HOSPADM

## 2023-01-06 RX ORDER — LIDOCAINE 40 MG/G
CREAM TOPICAL
Status: DISCONTINUED | OUTPATIENT
Start: 2023-01-06 | End: 2023-01-08

## 2023-01-06 RX ORDER — OXYCODONE HYDROCHLORIDE 5 MG/1
5 TABLET ORAL ONCE
Status: COMPLETED | OUTPATIENT
Start: 2023-01-06 | End: 2023-01-06

## 2023-01-06 RX ORDER — AMOXICILLIN 250 MG
1 CAPSULE ORAL 2 TIMES DAILY PRN
Status: DISCONTINUED | OUTPATIENT
Start: 2023-01-06 | End: 2023-01-10 | Stop reason: HOSPADM

## 2023-01-06 RX ORDER — TAMSULOSIN HYDROCHLORIDE 0.4 MG/1
0.4 CAPSULE ORAL DAILY
Status: DISCONTINUED | OUTPATIENT
Start: 2023-01-07 | End: 2023-01-10 | Stop reason: HOSPADM

## 2023-01-06 RX ORDER — POLYETHYLENE GLYCOL 3350 17 G/17G
17 POWDER, FOR SOLUTION ORAL DAILY PRN
Status: DISCONTINUED | OUTPATIENT
Start: 2023-01-06 | End: 2023-01-10 | Stop reason: HOSPADM

## 2023-01-06 RX ORDER — AMOXICILLIN 250 MG
2 CAPSULE ORAL 2 TIMES DAILY PRN
Status: DISCONTINUED | OUTPATIENT
Start: 2023-01-06 | End: 2023-01-10 | Stop reason: HOSPADM

## 2023-01-06 RX ADMIN — CEFEPIME HYDROCHLORIDE 2 G: 2 INJECTION, POWDER, FOR SOLUTION INTRAVENOUS at 21:19

## 2023-01-06 RX ADMIN — OXYCODONE HYDROCHLORIDE 5 MG: 5 TABLET ORAL at 22:27

## 2023-01-06 RX ADMIN — HEPARIN SODIUM 1200 UNITS/HR: 10000 INJECTION, SOLUTION INTRAVENOUS at 23:49

## 2023-01-06 RX ADMIN — VANCOMYCIN HYDROCHLORIDE 2500 MG: 10 INJECTION, POWDER, LYOPHILIZED, FOR SOLUTION INTRAVENOUS at 21:56

## 2023-01-06 RX ADMIN — PHYTONADIONE 5 MG: 10 INJECTION, EMULSION INTRAMUSCULAR; INTRAVENOUS; SUBCUTANEOUS at 21:57

## 2023-01-06 ASSESSMENT — ENCOUNTER SYMPTOMS
FEVER: 0
ARTHRALGIAS: 1
BACK PAIN: 1
CHILLS: 0

## 2023-01-06 ASSESSMENT — ACTIVITIES OF DAILY LIVING (ADL)
ADLS_ACUITY_SCORE: 35

## 2023-01-06 NOTE — PROGRESS NOTES
Assessment & Plan     1. Right elbow pain  Concerns for septic joint as he has redness, swelling, warmth. Decreased ROM with flexion / extension, along with swelling into his forearm.   He does have an abrasion at the site, but does not have surrounding erythema or swelling.   HX of MRSA, infected joint and sepsis  Attempted to have him scheduled today with Sports medicine / Ortho - earliest appt on Monday  Advised ER for further evaluation  - XR Elbow Right G/E 3 Views; Future      [facility] [place]    CHIEF COMPLAINT:   Chief Complaint   Patient presents with     Fall     Pt was seen on jan 4 for a fall, and now he has a swollen elbow also hurts     Subjective     Ralph is a 82 year old male who presents to clinic today for evaluation of right elbow pain. Patient sustained a fall on Jan 2. Injured his shoulder and back. Now, complaining of pain in his right elbow since yesterday. Wife noticed it was swollen and warm. Has had severe pain in his elbow. He has been taking Tramadol for pain, which helps his shoulder pain, but it does not help his elbow pain.   Patient has been fatigued, slept most of the day yesterday. denies having fever, chills, numbness, tingling, pale or cold extremity.   HX of sepsis and infected joint. History of MRSA. Takes one dose of augmentin daily to prophylax infection.       Past Medical History:   Diagnosis Date     Abdominal pain      Anemia     currently taking iron     Back pain     since 1980     BPH (benign prostatic hyperplasia)      Bruit      CAD (coronary artery disease)      Cellulitis 10/18/2012     Cellulitis 05/2018    GrpB strep LLE cellulitis  negative RACHAEL for veg     Chronic venous insufficiency     bilat lower extremities     Contact dermatitis and other eczema, due to unspecified cause      Diaphragmatic hernia without mention of obstruction or gangrene      Diastolic HF (heart failure) (H)      Gastric ulcer      Hypertension  08/06/2013     Lumbago      Mesenteric artery insufficiency (H)     known AAA and narrowing of intestinal artery's  followed by dr raymond     Metabolic syndrome      Mitral valve disease     s/p mechanical MVR, prior [facility] AVR     Nonallopathic lesion of lumbar region      Nonallopathic lesion of rib cage      Nonallopathic lesion of sacral region      GAGE (obstructive sleep apnea)     CPAP     Paroxysmal atrial fibrillation (H) 10/18/2012    occured after stopping BB  (prior  aflutter ablation)     Prostate cancer (H) 2008    radiation seed, XRT      PVD (peripheral vascular disease) (H)      Rotator cuff strain     and sprain     S/P AAA repair      S/P aortic valve replacement 2006    developed perivalve leak and MS, therefore redo surg 2013     S/P CABG (coronary artery bypass graft) 2006    Lima-Lad, RA-Rca     Sciatica of left side     since 2000     Sepsis due to group B Streptococcus (H) 05/19/2018     TIA (transient ischemic attack) 8/1/2022     Total knee replacement status 7/22/2019     Ulcerative colitis (H)      Varicose veins of bilateral lower extremities with other complications     s/p RLE vein stripping     Vitamin D deficiency      Past Surgical History:   Procedure Laterality Date     AORTIC VALVE REPLACEMENT  1/3/06    redo AVR SJM 21mm and SJM MVR 27mm in 2013SJM 21([id number]):AVR, SJM 27 :MVR-     APPLY WOUND VAC N/A 11/12/2019    Procedure: APPLICATION, WOUND VAC;  Surgeon: Sara Martinez MD;  Location: [facility] OR     ARTHROPLASTY KNEE      right knee     ARTHROPLASTY KNEE Right 7/22/2019    Procedure: Right total knee arthroplasty;  Surgeon: Prasanth Jensen MD;  Location: [facility] OR     BACK SURGERY  Oct 2015    Fusion L4-5, laminectomy L2, L3     BYPASS GRAFT ARTERY CORONARY  10/2013    reimplantation of radial artery graft to RCA     CARDIAC CATHERIZATION  11/2005    Stent placed to RCA     CARDIAC CATHERIZATION  04/2013    Occluded RCA, patent LIMA to LAD and radial graft to PDA      CARPAL TUNNEL RELEASE RT/LT  1994     COLONOSCOPY  8-22-11     CYSTOSCOPY FLEXIBLE  10/16/2013    Procedure: CYSTOSCOPY FLEXIBLE;  FLEXIBLE CYSTOSCOPY / DILATION OF URETHRA / INSERTION OF LESLIE;  Surgeon: Cooper Wallace MD;  Location:  OR     ENDOVASCULAR REPAIR, INFRARENAL ABDOMINAL AORTIC ANEURYSM/DISSECTION; MODULAR BIFURCATED PROSTHESIS  2006    AAA repair endovascular     ENT SURGERY       EP ABLATION ATRIAL FLUTTER N/A 4/22/2019    Procedure: EP Ablation Atrial Flutter;  Surgeon: Jessy Vasquez MD;  Location:  HE[facility] CARD[facility] C[facility] LAB     EP PACEMAKER DEVICE & LEAD IMPLANT- RIGHT ATRIAL & RIGHT VENTRICULAR N/A 8/2/2022    Procedure: Pacemaker Device & Lead Implant - Right Atrial & Right Ventricular;  Surgeon: Jessy Vasquez MD;  Location:  HEART CARDI[facility] CATH LAB     GENITOURINARY SURGERY  6/16/08    radioactive seeding     GRAFT FLAP PEDICLE EXTREMITY (LOCATION) Right 11/12/2019    Procedure: SURGICAL PROCUREMENT, FLAP, PEDICLE, EXTREMITY;  Surgeon: Sara Martinez MD;  Location:  OR     GRAFT SKIN SPLIT THICKNESS FROM EXTREMITY Right 11/12/2019    Procedure: RIGHT GASTRONEMIUS FLAP TO RIGHT KNEE, SPLIT THICKNESS SKIN GRAFT FROM RIGHT THIGH TO RIGHT KNEE, SURGICAL PROCUREMENT, FLAP, PEDICLE, EXTREMITY, WOUND VAC PLACEMENT;  Surgeon: Sara Martinez MD;  Location:  OR     HEAD & NECK SURGERY  1997    vocal cord polypectomy     INCISION AND DRAINAGE KNEE, COMBINED Right 8/29/2019    Procedure: INCISION AND DRAINAGE, KNEE W/ Secondary Wound Closure;  Surgeon: Prasanth Jensen MD;  Location:  OR     IRRIGATION AND DEBRIDEMENT KNEE, PLACE ANTIBIOTIC CEMENT BEADS / SPACE Right 2/12/2020    Procedure: 1. Irrigation and debridement of wound breakdown, right total knee arthroplasty.  2. Irrigation and debridement of right total knee with placement of antibiotic-impregnated (vancomycin) absorbable antibiotic beads.;  Surgeon: Prasanth Jensen MD;   Location: [facility] OR     IRRIGATION AND DEBRIDEMENT LOWER EXTREMITY, COMBINED Right 2019    Procedure: DEBRIDEMENT OF RIGHT CALF AND WOUND CLOSURE.;  Surgeon: Sara Martinez MD;  Location: [facility] OR     KNEE SURGERY   Right knee arthroscopy     OPTICAL TRACKING SYSTEM FUSION SPINE POSTERIOR LUMBAR THREE+ LEVELS N/A 10/29/2015    Procedure: OPTICAL TRACKING SYSTEM FUSION SPINE POSTERIOR LUMBAR THREE+ LEVELS;  Surgeon: Walt Garcia MD;  Location:  OR     PROSTATE SURGERY      radioactive seeding 08     REPAIR ANEURYSM ABDOMINAL AORTA       REPAIR VALVE MITRAL  10/16/2013    SJM 21([id number]):AVR, SJM 27 :MVRProcedure: REPAIR VALVE MITRAL;  REDO STERNOTOMY/REDO AORTIC VALVE REPLACEMENT/ MITRAL VALVE REPLACEMENT/REIMPLANTATION OF RIGHT CORONARY ARTERY BYPASS WITH RACHAEL ( ON PUMP);  Surgeon: Viet Singh MD;  Location:  OR     REPLACE VALVE AORTIC  10/16/2013    Procedure: REPLACE VALVE AORTIC;;  Surgeon: Viet Singh MD;  Location:  OR     SURGERY GENERAL IP CONSULT  2008 Excision aneurysm abdominal aorta     SURGERY GENERAL IP CONSULT   Vocal cord polypectomy     VASCULAR SURGERY  ,      varicose vein stripping     ZZC CABG, VEIN, TWO  1/3/06    Left radial to RCA, LIMA to LAD (RA to RCA reimplanted at time of 2013 surg)     Social History     Tobacco Use     Smoking status: Former     Packs/day: 1.00     Years: 40.00     Pack years: 40.00     Types: Cigarettes     Start date: 4/15/1962     Quit date: 10/23/2002     Years since quittin.2     Smokeless tobacco: Never   Substance Use Topics     Alcohol use: Yes     Comment: a couple beers per week (socially)     Current Outpatient Medications   Medication     acetaminophen (TYLENOL) 500 MG tablet     amoxicillin-clavulanate (AUGMENTIN) 875-125 MG tablet     betamethasone valerate (VALISONE) 0.1 % external lotion     cholecalciferol 25 MCG (1000 UT) TABS     ezetimibe (ZETIA) 10 MG tablet     ferrous  sulfate (FEROSUL) 325 (65 Fe) MG tablet     fluocinonide (LIDEX) 0.05 % external ointment     glucosamine-chondroitin 500-400 MG CAPS per capsule     HYDROcodone-acetaminophen (NORCO) 5-325 MG tablet     mesalamine (ASACOL HD) 800 MG EC tablet     metoprolol succinate ER (TOPROL XL) 50 MG 24 hr tablet     rosuvastatin (CRESTOR) 40 MG tablet     tamsulosin (FLOMAX) 0.4 MG capsule     triamcinolone (KENALOG) 0.1 % external cream     triamcinolone (KENALOG) 0.1 % external lotion     warfarin ANTICOAGULANT (COUMADIN) 5 MG tablet     No current facility-administered medications for this visit.     Allergies   Allergen Reactions     Bees Anaphylaxis       10 point ROS of systems were all negative except for pertinent positives noted in my HPI.      Exam:   /71   Pulse 75   Temp 98.4  F (36.9  C)   SpO2 94%   Gen: Alert, patient appears to be in pain.   Extremity: Right elbow has erythema, warmth and swelling. He has reduced ROM in his elbow with both flexion and extension.   There is not compromise to the distal circulation.  Pulses are +2 and CRT is brisk  EXTREMITIES: peripheral pulses normal  SKIN: no suspicious lesions or rashes  NEURO: Normal strength and tone, sensory exam grossly normal, mentation intact and speech normal    No results found for any visits on 01/06/23.    Jose J Consult

## 2023-01-06 NOTE — ED TRIAGE NOTES
Pt had a fall 5 days ago landing on his right elbow and dac.  Was seen by PCP and given a sling.  Went to pcp today again with increased pain, redness, and swelling of right elbow.     Triage Assessment     Row Name 01/06/23 1237       Triage Assessment (Adult)    Airway WDL WDL       Respiratory WDL    Respiratory WDL WDL

## 2023-01-07 ENCOUNTER — ANESTHESIA (OUTPATIENT)
Dept: SURGERY | Facility: CLINIC | Age: 83
DRG: 511 | End: 2023-01-07
Payer: MEDICARE

## 2023-01-07 ENCOUNTER — ANESTHESIA EVENT (OUTPATIENT)
Dept: SURGERY | Facility: CLINIC | Age: 83
DRG: 511 | End: 2023-01-07
Payer: MEDICARE

## 2023-01-07 LAB
ANION GAP SERPL CALCULATED.3IONS-SCNC: 12 MMOL/L (ref 7–15)
BUN SERPL-MCNC: 14.7 MG/DL (ref 8–23)
CALCIUM SERPL-MCNC: 8.7 MG/DL (ref 8.8–10.2)
CHLORIDE SERPL-SCNC: 101 MMOL/L (ref 98–107)
CREAT SERPL-MCNC: 0.74 MG/DL (ref 0.67–1.17)
CRYSTALS SNV MICRO: ABNORMAL
DEPRECATED HCO3 PLAS-SCNC: 25 MMOL/L (ref 22–29)
ERYTHROCYTE [DISTWIDTH] IN BLOOD BY AUTOMATED COUNT: 13 % (ref 10–15)
GFR SERPL CREATININE-BSD FRML MDRD: >90 ML/MIN/1.73M2
GLUCOSE BLDC GLUCOMTR-MCNC: 109 MG/DL (ref 70–99)
GLUCOSE SERPL-MCNC: 110 MG/DL (ref 70–99)
HCT VFR BLD AUTO: 36.1 % (ref 40–53)
HGB BLD-MCNC: 11.1 G/DL (ref 13.3–17.7)
LACTATE SERPL-SCNC: 1 MMOL/L (ref 0.7–2)
MCH RBC QN AUTO: 29.1 PG (ref 26.5–33)
MCHC RBC AUTO-ENTMCNC: 30.7 G/DL (ref 31.5–36.5)
MCV RBC AUTO: 95 FL (ref 78–100)
PLATELET # BLD AUTO: 238 10E3/UL (ref 150–450)
POTASSIUM SERPL-SCNC: 3.8 MMOL/L (ref 3.4–5.3)
RBC # BLD AUTO: 3.81 10E6/UL (ref 4.4–5.9)
SODIUM SERPL-SCNC: 138 MMOL/L (ref 136–145)
UFH PPP CHRO-ACNC: <0.1 IU/ML
WBC # BLD AUTO: 11.2 10E3/UL (ref 4–11)

## 2023-01-07 PROCEDURE — 87077 CULTURE AEROBIC IDENTIFY: CPT | Performed by: ORTHOPAEDIC SURGERY

## 2023-01-07 PROCEDURE — 85520 HEPARIN ASSAY: CPT | Performed by: INTERNAL MEDICINE

## 2023-01-07 PROCEDURE — 0RBL0ZZ EXCISION OF RIGHT ELBOW JOINT, OPEN APPROACH: ICD-10-PCS | Performed by: ORTHOPAEDIC SURGERY

## 2023-01-07 PROCEDURE — 120N000001 HC R&B MED SURG/OB

## 2023-01-07 PROCEDURE — 99222 1ST HOSP IP/OBS MODERATE 55: CPT | Performed by: SPECIALIST

## 2023-01-07 PROCEDURE — 250N000013 HC RX MED GY IP 250 OP 250 PS 637: Performed by: PHYSICIAN ASSISTANT

## 2023-01-07 PROCEDURE — 258N000003 HC RX IP 258 OP 636: Performed by: PHYSICIAN ASSISTANT

## 2023-01-07 PROCEDURE — 258N000003 HC RX IP 258 OP 636: Performed by: NURSE ANESTHETIST, CERTIFIED REGISTERED

## 2023-01-07 PROCEDURE — 250N000013 HC RX MED GY IP 250 OP 250 PS 637: Performed by: INTERNAL MEDICINE

## 2023-01-07 PROCEDURE — 258N000001 HC RX 258: Performed by: ORTHOPAEDIC SURGERY

## 2023-01-07 PROCEDURE — 83605 ASSAY OF LACTIC ACID: CPT | Performed by: HOSPITALIST

## 2023-01-07 PROCEDURE — 272N000001 HC OR GENERAL SUPPLY STERILE: Performed by: ORTHOPAEDIC SURGERY

## 2023-01-07 PROCEDURE — 250N000011 HC RX IP 250 OP 636: Performed by: NURSE ANESTHETIST, CERTIFIED REGISTERED

## 2023-01-07 PROCEDURE — 250N000011 HC RX IP 250 OP 636: Performed by: INTERNAL MEDICINE

## 2023-01-07 PROCEDURE — 360N000075 HC SURGERY LEVEL 2, PER MIN: Performed by: ORTHOPAEDIC SURGERY

## 2023-01-07 PROCEDURE — 36415 COLL VENOUS BLD VENIPUNCTURE: CPT | Performed by: INTERNAL MEDICINE

## 2023-01-07 PROCEDURE — 250N000011 HC RX IP 250 OP 636: Performed by: PHYSICIAN ASSISTANT

## 2023-01-07 PROCEDURE — 370N000017 HC ANESTHESIA TECHNICAL FEE, PER MIN: Performed by: ORTHOPAEDIC SURGERY

## 2023-01-07 PROCEDURE — 710N000010 HC RECOVERY PHASE 1, LEVEL 2, PER MIN: Performed by: ORTHOPAEDIC SURGERY

## 2023-01-07 PROCEDURE — 87075 CULTR BACTERIA EXCEPT BLOOD: CPT | Performed by: ORTHOPAEDIC SURGERY

## 2023-01-07 PROCEDURE — 80048 BASIC METABOLIC PNL TOTAL CA: CPT | Performed by: INTERNAL MEDICINE

## 2023-01-07 PROCEDURE — 87205 SMEAR GRAM STAIN: CPT | Performed by: ORTHOPAEDIC SURGERY

## 2023-01-07 PROCEDURE — 999N000141 HC STATISTIC PRE-PROCEDURE NURSING ASSESSMENT: Performed by: ORTHOPAEDIC SURGERY

## 2023-01-07 PROCEDURE — 36415 COLL VENOUS BLD VENIPUNCTURE: CPT | Performed by: HOSPITALIST

## 2023-01-07 PROCEDURE — 250N000009 HC RX 250: Performed by: NURSE ANESTHETIST, CERTIFIED REGISTERED

## 2023-01-07 PROCEDURE — 99233 SBSQ HOSP IP/OBS HIGH 50: CPT | Performed by: HOSPITALIST

## 2023-01-07 PROCEDURE — 258N000003 HC RX IP 258 OP 636: Performed by: ANESTHESIOLOGY

## 2023-01-07 PROCEDURE — 258N000003 HC RX IP 258 OP 636: Performed by: INTERNAL MEDICINE

## 2023-01-07 PROCEDURE — 85027 COMPLETE CBC AUTOMATED: CPT | Performed by: INTERNAL MEDICINE

## 2023-01-07 RX ORDER — NALOXONE HYDROCHLORIDE 0.4 MG/ML
0.4 INJECTION, SOLUTION INTRAMUSCULAR; INTRAVENOUS; SUBCUTANEOUS
Status: DISCONTINUED | OUTPATIENT
Start: 2023-01-07 | End: 2023-01-10 | Stop reason: HOSPADM

## 2023-01-07 RX ORDER — FENTANYL CITRATE 50 UG/ML
50 INJECTION, SOLUTION INTRAMUSCULAR; INTRAVENOUS EVERY 5 MIN PRN
Status: DISCONTINUED | OUTPATIENT
Start: 2023-01-07 | End: 2023-01-07 | Stop reason: HOSPADM

## 2023-01-07 RX ORDER — CEFAZOLIN SODIUM 2 G/100ML
2 INJECTION, SOLUTION INTRAVENOUS EVERY 8 HOURS
Status: DISCONTINUED | OUTPATIENT
Start: 2023-01-07 | End: 2023-01-07

## 2023-01-07 RX ORDER — FENTANYL CITRATE 50 UG/ML
INJECTION, SOLUTION INTRAMUSCULAR; INTRAVENOUS PRN
Status: DISCONTINUED | OUTPATIENT
Start: 2023-01-07 | End: 2023-01-07

## 2023-01-07 RX ORDER — LABETALOL HYDROCHLORIDE 5 MG/ML
10 INJECTION, SOLUTION INTRAVENOUS
Status: DISCONTINUED | OUTPATIENT
Start: 2023-01-07 | End: 2023-01-07 | Stop reason: HOSPADM

## 2023-01-07 RX ORDER — HYDRALAZINE HYDROCHLORIDE 20 MG/ML
2.5-5 INJECTION INTRAMUSCULAR; INTRAVENOUS EVERY 10 MIN PRN
Status: DISCONTINUED | OUTPATIENT
Start: 2023-01-07 | End: 2023-01-07 | Stop reason: HOSPADM

## 2023-01-07 RX ORDER — VANCOMYCIN HYDROCHLORIDE 1 G/200ML
1000 INJECTION, SOLUTION INTRAVENOUS EVERY 12 HOURS
Status: DISCONTINUED | OUTPATIENT
Start: 2023-01-07 | End: 2023-01-08

## 2023-01-07 RX ORDER — ONDANSETRON 4 MG/1
4 TABLET, ORALLY DISINTEGRATING ORAL EVERY 30 MIN PRN
Status: DISCONTINUED | OUTPATIENT
Start: 2023-01-07 | End: 2023-01-07 | Stop reason: HOSPADM

## 2023-01-07 RX ORDER — CEFAZOLIN SODIUM 2 G/100ML
2 INJECTION, SOLUTION INTRAVENOUS EVERY 8 HOURS
Status: COMPLETED | OUTPATIENT
Start: 2023-01-07 | End: 2023-01-08

## 2023-01-07 RX ORDER — WARFARIN SODIUM 7.5 MG/1
7.5 TABLET ORAL ONCE
Status: COMPLETED | OUTPATIENT
Start: 2023-01-07 | End: 2023-01-07

## 2023-01-07 RX ORDER — LIDOCAINE 40 MG/G
CREAM TOPICAL
Status: DISCONTINUED | OUTPATIENT
Start: 2023-01-07 | End: 2023-01-10 | Stop reason: HOSPADM

## 2023-01-07 RX ORDER — PROPOFOL 10 MG/ML
INJECTION, EMULSION INTRAVENOUS PRN
Status: DISCONTINUED | OUTPATIENT
Start: 2023-01-07 | End: 2023-01-07

## 2023-01-07 RX ORDER — LIDOCAINE HYDROCHLORIDE 10 MG/ML
INJECTION, SOLUTION INFILTRATION; PERINEURAL PRN
Status: DISCONTINUED | OUTPATIENT
Start: 2023-01-07 | End: 2023-01-07

## 2023-01-07 RX ORDER — ALBUTEROL SULFATE 0.83 MG/ML
2.5 SOLUTION RESPIRATORY (INHALATION) EVERY 4 HOURS PRN
Status: DISCONTINUED | OUTPATIENT
Start: 2023-01-07 | End: 2023-01-07 | Stop reason: HOSPADM

## 2023-01-07 RX ORDER — HYDROMORPHONE HCL IN WATER/PF 6 MG/30 ML
0.2 PATIENT CONTROLLED ANALGESIA SYRINGE INTRAVENOUS EVERY 5 MIN PRN
Status: DISCONTINUED | OUTPATIENT
Start: 2023-01-07 | End: 2023-01-07 | Stop reason: HOSPADM

## 2023-01-07 RX ORDER — CEFAZOLIN SODIUM 1 G/3ML
INJECTION, POWDER, FOR SOLUTION INTRAMUSCULAR; INTRAVENOUS PRN
Status: DISCONTINUED | OUTPATIENT
Start: 2023-01-07 | End: 2023-01-07

## 2023-01-07 RX ORDER — ONDANSETRON 2 MG/ML
4 INJECTION INTRAMUSCULAR; INTRAVENOUS EVERY 30 MIN PRN
Status: DISCONTINUED | OUTPATIENT
Start: 2023-01-07 | End: 2023-01-07 | Stop reason: HOSPADM

## 2023-01-07 RX ORDER — SODIUM CHLORIDE, SODIUM LACTATE, POTASSIUM CHLORIDE, CALCIUM CHLORIDE 600; 310; 30; 20 MG/100ML; MG/100ML; MG/100ML; MG/100ML
INJECTION, SOLUTION INTRAVENOUS CONTINUOUS
Status: DISCONTINUED | OUTPATIENT
Start: 2023-01-07 | End: 2023-01-07 | Stop reason: HOSPADM

## 2023-01-07 RX ORDER — HYDROMORPHONE HCL IN WATER/PF 6 MG/30 ML
0.4 PATIENT CONTROLLED ANALGESIA SYRINGE INTRAVENOUS EVERY 5 MIN PRN
Status: DISCONTINUED | OUTPATIENT
Start: 2023-01-07 | End: 2023-01-07 | Stop reason: HOSPADM

## 2023-01-07 RX ORDER — NALOXONE HYDROCHLORIDE 0.4 MG/ML
0.2 INJECTION, SOLUTION INTRAMUSCULAR; INTRAVENOUS; SUBCUTANEOUS
Status: DISCONTINUED | OUTPATIENT
Start: 2023-01-07 | End: 2023-01-10 | Stop reason: HOSPADM

## 2023-01-07 RX ORDER — DEXAMETHASONE SODIUM PHOSPHATE 4 MG/ML
INJECTION, SOLUTION INTRA-ARTICULAR; INTRALESIONAL; INTRAMUSCULAR; INTRAVENOUS; SOFT TISSUE PRN
Status: DISCONTINUED | OUTPATIENT
Start: 2023-01-07 | End: 2023-01-07

## 2023-01-07 RX ORDER — SODIUM CHLORIDE 9 MG/ML
INJECTION, SOLUTION INTRAVENOUS CONTINUOUS
Status: DISCONTINUED | OUTPATIENT
Start: 2023-01-07 | End: 2023-01-08

## 2023-01-07 RX ORDER — FENTANYL CITRATE 50 UG/ML
25 INJECTION, SOLUTION INTRAMUSCULAR; INTRAVENOUS EVERY 5 MIN PRN
Status: DISCONTINUED | OUTPATIENT
Start: 2023-01-07 | End: 2023-01-07 | Stop reason: HOSPADM

## 2023-01-07 RX ORDER — ONDANSETRON 2 MG/ML
INJECTION INTRAMUSCULAR; INTRAVENOUS PRN
Status: DISCONTINUED | OUTPATIENT
Start: 2023-01-07 | End: 2023-01-07

## 2023-01-07 RX ADMIN — PROPOFOL 100 MG: 10 INJECTION, EMULSION INTRAVENOUS at 17:51

## 2023-01-07 RX ADMIN — CEFAZOLIN SODIUM 2 G: 2 INJECTION, SOLUTION INTRAVENOUS at 11:27

## 2023-01-07 RX ADMIN — WARFARIN SODIUM 7.5 MG: 7.5 TABLET ORAL at 22:45

## 2023-01-07 RX ADMIN — ROSUVASTATIN CALCIUM 40 MG: 20 TABLET, FILM COATED ORAL at 09:02

## 2023-01-07 RX ADMIN — CEFAZOLIN SODIUM 2 G: 2 INJECTION, SOLUTION INTRAVENOUS at 20:54

## 2023-01-07 RX ADMIN — MESALAMINE 800 MG: 800 TABLET, DELAYED RELEASE ORAL at 09:49

## 2023-01-07 RX ADMIN — FENTANYL CITRATE 100 MCG: 50 INJECTION, SOLUTION INTRAMUSCULAR; INTRAVENOUS at 17:51

## 2023-01-07 RX ADMIN — OXYCODONE HYDROCHLORIDE 5 MG: 5 TABLET ORAL at 00:02

## 2023-01-07 RX ADMIN — OXYCODONE HYDROCHLORIDE 5 MG: 5 TABLET ORAL at 05:34

## 2023-01-07 RX ADMIN — SODIUM CHLORIDE: 9 INJECTION, SOLUTION INTRAVENOUS at 22:43

## 2023-01-07 RX ADMIN — FENTANYL CITRATE 50 MCG: 50 INJECTION, SOLUTION INTRAMUSCULAR; INTRAVENOUS at 18:16

## 2023-01-07 RX ADMIN — DEXAMETHASONE SODIUM PHOSPHATE 4 MG: 4 INJECTION, SOLUTION INTRA-ARTICULAR; INTRALESIONAL; INTRAMUSCULAR; INTRAVENOUS; SOFT TISSUE at 17:51

## 2023-01-07 RX ADMIN — METOPROLOL SUCCINATE 75 MG: 50 TABLET, EXTENDED RELEASE ORAL at 09:02

## 2023-01-07 RX ADMIN — SODIUM CHLORIDE, POTASSIUM CHLORIDE, SODIUM LACTATE AND CALCIUM CHLORIDE: 600; 310; 30; 20 INJECTION, SOLUTION INTRAVENOUS at 00:34

## 2023-01-07 RX ADMIN — VANCOMYCIN HYDROCHLORIDE 1000 MG: 1 INJECTION, SOLUTION INTRAVENOUS at 09:50

## 2023-01-07 RX ADMIN — VANCOMYCIN HYDROCHLORIDE 1000 MG: 1 INJECTION, SOLUTION INTRAVENOUS at 22:44

## 2023-01-07 RX ADMIN — TAMSULOSIN HYDROCHLORIDE 0.4 MG: 0.4 CAPSULE ORAL at 09:02

## 2023-01-07 RX ADMIN — CEFAZOLIN 2 G: 1 INJECTION, POWDER, FOR SOLUTION INTRAMUSCULAR; INTRAVENOUS at 18:14

## 2023-01-07 RX ADMIN — CEFAZOLIN 1 G: 1 INJECTION, POWDER, FOR SOLUTION INTRAMUSCULAR; INTRAVENOUS at 05:35

## 2023-01-07 RX ADMIN — MESALAMINE 800 MG: 800 TABLET, DELAYED RELEASE ORAL at 21:03

## 2023-01-07 RX ADMIN — SODIUM CHLORIDE, POTASSIUM CHLORIDE, SODIUM LACTATE AND CALCIUM CHLORIDE: 600; 310; 30; 20 INJECTION, SOLUTION INTRAVENOUS at 17:32

## 2023-01-07 RX ADMIN — OXYCODONE HYDROCHLORIDE 5 MG: 5 TABLET ORAL at 15:36

## 2023-01-07 RX ADMIN — LIDOCAINE HYDROCHLORIDE 50 MG: 10 INJECTION, SOLUTION INFILTRATION; PERINEURAL at 17:51

## 2023-01-07 RX ADMIN — ONDANSETRON 4 MG: 2 INJECTION INTRAMUSCULAR; INTRAVENOUS at 18:20

## 2023-01-07 RX ADMIN — PHENYLEPHRINE HYDROCHLORIDE 100 MCG: 10 INJECTION INTRAVENOUS at 17:51

## 2023-01-07 ASSESSMENT — ACTIVITIES OF DAILY LIVING (ADL)
ADLS_ACUITY_SCORE: 26
ADLS_ACUITY_SCORE: 22
ADLS_ACUITY_SCORE: 22
CHANGE_IN_FUNCTIONAL_STATUS_SINCE_ONSET_OF_CURRENT_ILLNESS/INJURY: YES
ADLS_ACUITY_SCORE: 30
ADLS_ACUITY_SCORE: 35
CONCENTRATING,_REMEMBERING_OR_MAKING_DECISIONS_DIFFICULTY: NO
ADLS_ACUITY_SCORE: 35
WEAR_GLASSES_OR_BLIND: YES
TOILETING_ISSUES: NO
VISION_MANAGEMENT: GLASSES
ADLS_ACUITY_SCORE: 28
FALL_HISTORY_WITHIN_LAST_SIX_MONTHS: YES
DIFFICULTY_COMMUNICATING: NO
DOING_ERRANDS_INDEPENDENTLY_DIFFICULTY: NO
ADLS_ACUITY_SCORE: 22
WALKING_OR_CLIMBING_STAIRS_DIFFICULTY: NO
ADLS_ACUITY_SCORE: 22
ADLS_ACUITY_SCORE: 22
NUMBER_OF_TIMES_PATIENT_HAS_FALLEN_WITHIN_LAST_SIX_MONTHS: 1
DIFFICULTY_EATING/SWALLOWING: NO
ADLS_ACUITY_SCORE: 35
DRESSING/BATHING_DIFFICULTY: NO
ADLS_ACUITY_SCORE: 28

## 2023-01-07 ASSESSMENT — ENCOUNTER SYMPTOMS: DYSRHYTHMIAS: 1

## 2023-01-07 NOTE — ED PROVIDER NOTES
History     Chief Complaint:  Fall, elbow pain     HPI   Fausto Farr is a 82 year old left handed male on Coumadin who presents with an elbow injury after a fall. Patient states that he fell on his driveway and hit his right elbow, shoulder, and hip on 01/02/23. Patient took Hydrocodone and Acetaminophen for pain per his wife. He notes that he went to Urgent Care on 01/04, and had an X-ray on his back and shoulder (See results below). The patient states during that time, he did not have pain in his elbow, only in his shoulder and back. Yesterday night, he noticed his right elbow to become more swollen and stiff prompting him to come to the [facility]. He states the limited mobility of the elbow is due to pain. The patient does state that his arm pain overall feels better today, but that the swelling has worsened this morning. Family member notes that he took 1 painkiller tablet around 7:30AM this morning for the pain which provided minimal improvement. Patient reportedly went to [facility] today and had an X-ray of the elbow and was told by provider to come to get medical treatment for elbow before weekend and prompted him to come to ED due to trouble scheduling a [facility] appointment. He does state that since the fall, he has had less movement in the elbow, but denies having any fevers and chills. The patient also reports ongoing back pain off to the right in the low back without associated leg pain, numbness/tingling, weakness or bowel/bladder changes. The patient does not have other medical concerns at the moment.     X-ray Right Shoulder (01/04/23)  Impression:     1. Degenerative change at the right glenohumeral joint without  evidence for fracture or dislocation. Mild hypertrophic change at the  AC joint. Cardiac pacer and sternotomy wires.    X-ray Lumbar Spine (01/04/23)  Impression:     Posterior carter and screw fixation hardware at L4-L5.  Hardware appears intact. Lumbar spine alignment is within  normal  limits. Chronic superior endplate deformity of L4, similar to prior  x-ray 10/29/2015. No definite new loss of vertebral body height.  Moderate degenerative endplate changes and loss of disc height in the  lower thoracic spine. Moderate facet hypertrophy in the lower lumbar  spine. Aortobiiliac stent.    XR elbow right 1/6/23    IMPRESSION: Mild osteoarthrosis of the elbow. There is no evidence of  fracture. There is soft tissue calcification about the medial and  lateral aspects of the elbow which may be from long-standing  tendon/ligament/capsule degeneration. A component of chondrocalcinosis  cannot be ruled out. Extensive arterial calcifications.    Review of External Notes: Reviewed from outpatient visit earlier today    ROS:  Review of Systems   Constitutional: Negative for chills and fever.   Musculoskeletal: Positive for arthralgias (R elbow, shoulder, hip) and back pain (R lower).   All other systems reviewed and are negative.    Allergies:  Vitamin B6     Medications:    Augmentin  Valisone  1000 UT  Zetia  Ferosul  Glucosamine-chondroitin 500-400  Norco  Asacol HD  Toprol XL  Crestor  Flomax  Kenalog  Coumadin    Past Medical History:    Urinary retention  Mitral regurgitation  Hyperlipidemia  Rotator cuff sprain and strain  Ulcerative colitis  Atrial fibrillation  Gastric ulcer  Lumbago  Diaphragmatic hernia  Nocturia  Coronary artery disease  Hypertension  Heart murmur  Methicillin resistant staphylococcus aureus  Prostate cancer  Glaucoma suspect  Abdominal aortic aneurysm  Chronic back pain  Benign prostatic hyperplasia  Anemia  Dermatitis  Coronary arteriosclerosis  Ulcerative colitis   Anemia  Cellulitis  Diastolic heart failure  Nonallopathic lesion of lumbar region  Nonallopathic lesion of sacral region  Peripheral vascular disease    Past Surgical History:   Abdominal aortic aneurysm repair  Aortic valve replacement  Carpal tunnel release  Mitral valve replacement  Coronary artery bypass  graft    Past Surgical History:   Procedure Laterality Date     AORTIC VALVE REPLACEMENT  1/3/06    redo AVR SJM 21mm and SJM MVR 27mm in 2013SJM 21([id number]):AVR, SJM 27 :MVR-     APPLY WOUND VAC N/A 11/12/2019    Procedure: APPLICATION, WOUND VAC;  Surgeon: Sara Martinez MD;  Location:  OR     ARTHROPLASTY KNEE      right knee     ARTHROPLASTY KNEE Right 7/22/2019    Procedure: Right total knee arthroplasty;  Surgeon: Prasanth Jensen MD;  Location:  OR     BACK SURGERY  Oct 2015    Fusion L4-5, laminectomy L2, L3     BYPASS GRAFT ARTERY CORONARY  10/2013    reimplantation of radial artery graft to RCA     CARDIAC CATHERIZATION  11/2005    Stent placed to RCA     CARDIAC CATHERIZATION  04/2013    Occluded RCA, patent LIMA to LAD and radial graft to PDA     CARPAL TUNNEL RELEASE RT/LT  1994     COLONOSCOPY  8-22-11     CYSTOSCOPY FLEXIBLE  10/16/2013    Procedure: CYSTOSCOPY FLEXIBLE;  FLEXIBLE CYSTOSCOPY / DILATION OF URETHRA / INSERTION OF LESLIE;  Surgeon: Cooper Wallace MD;  Location:  OR     ENDOVASCULAR REPAIR, INFRARENAL ABDOMINAL AORTIC ANEURYSM/DISSECTION; MODULAR BIFURCATED PROSTHESIS  2006    AAA repair endovascular     ENT SURGERY       EP ABLATION ATRIAL FLUTTER N/A 4/22/2019    Procedure: EP Ablation Atrial Flutter;  Surgeon: Jessy Vasquez MD;  Location:  HEART CARDIAC CATH LAB     EP PACEMAKER DEVICE & LEAD IMPLANT- RIGHT ATRIAL & RIGHT VENTRICULAR N/A 8/2/2022    Procedure: Pacemaker Device & Lead Implant - Right Atrial & Right Ventricular;  Surgeon: Jessy Vasquez MD;  Location:  HEART CARDIAC CATH LAB     GENITOURINARY SURGERY  6/16/08    radioactive seeding     GRAFT FLAP PEDICLE EXTREMITY (LOCATION) Right 11/12/2019    Procedure: SURGICAL PROCUREMENT, FLAP, PEDICLE, EXTREMITY;  Surgeon: Sara Martinez MD;  Location:  OR     GRAFT SKIN SPLIT THICKNESS FROM EXTREMITY Right 11/12/2019    Procedure: RIGHT GASTRONEMIUS FLAP TO  RIGHT KNEE, SPLIT THICKNESS SKIN GRAFT FROM RIGHT THIGH TO RIGHT KNEE, SURGICAL PROCUREMENT, FLAP, PEDICLE, EXTREMITY, WOUND VAC PLACEMENT;  Surgeon: Sara Martinez MD;  Location:  OR     HEAD & NECK SURGERY  1997    vocal cord polypectomy     INCISION AND DRAINAGE KNEE, COMBINED Right 8/29/2019    Procedure: INCISION AND DRAINAGE, KNEE W/ Secondary Wound Closure;  Surgeon: Prasanth Jensen MD;  Location:  OR     IRRIGATION AND DEBRIDEMENT KNEE, PLACE ANTIBIOTIC CEMENT BEADS / SPACE Right 2/12/2020    Procedure: 1. Irrigation and debridement of wound breakdown, right total knee arthroplasty.  2. Irrigation and debridement of right total knee with placement of antibiotic-impregnated (vancomycin) absorbable antibiotic beads.;  Surgeon: Prasanth Jensen MD;  Location: [facility] OR     IRRIGATION AND DEBRIDEMENT LOWER EXTREMITY, COMBINED Right 12/8/2019    Procedure: DEBRIDEMENT OF RIGHT CALF AND WOUND CLOSURE.;  Surgeon: Sara Martinez MD;  Location:  OR     KNEE SURGERY  2001 Right knee arthroscopy     OPTICAL TRACKING SYSTEM FUSION SPINE POSTERIOR LUMBAR THREE+ LEVELS N/A 10/29/2015    Procedure: OPTICAL TRACKING SYSTEM FUSION SPINE POSTERIOR LUMBAR THREE+ LEVELS;  Surgeon: Walt Garcia MD;  Location:  OR     PROSTATE SURGERY      radioactive seeding 6/16/08     REPAIR ANEURYSM ABDOMINAL AORTA  06/08     REPAIR VALVE MITRAL  10/16/2013    SJM 21(AGFN [id number]):AVR, [facility] 27  501:MVRProcedure: REPAIR VALVE MITRAL;  REDO STERNOTOMY/REDO AORTIC VALVE REPLACEMENT/ MITRAL VALVE REPLACEMENT/REIMPLANTATION OF RIGHT CORONARY ARTERY BYPASS WITH RACHAEL ( ON PUMP);  Surgeon: Viet Singh MD;  Location:  OR     REPLACE VALVE AORTIC  10/16/2013    Procedure: REPLACE VALVE AORTIC;;  Surgeon: Viet Singh MD;  Location:  OR     SURGERY GENERAL IP CONSULT  05/2008 Excision aneurysm abdominal aorta     SURGERY GENERAL IP CONSULT  1997 Vocal cord polypectomy     VASCULAR SURGERY  1968,  1993     varicose vein stripping     [facility] CABG, VEIN, TWO  1/3/06    Left radial to RCA, LIMA to LAD (RA to RCA reimplanted at time of 2013 surg)        Family History:    Father: Asthma, Coronary artery disease  Mother: Asthma, Heart disease  Brother: Heart disease    Social History:  Patient arrived via private car with wife to ED.   PCP: Jodi Flower Mai     Physical Exam     Patient Vitals for the past 24 hrs:   BP Temp Pulse Resp SpO2   01/06/23 1523 (!) 143/79 -- 59 18 95 %   01/06/23 1236 138/77 98.2  F (36.8  C) 83 16 98 %        Physical Exam  General: Elderly male sitting upright  Eyes: PERRL, Conjunctive within normal limits  ENT: Moist mucous membranes, oropharynx clear.   CV: Normal S1S2. Systolic murmur. Regular rate and rhythm. Radial pulse palpable RUE.  Resp: Clear to auscultation bilaterally.Normal respiratory effort.  MSK: Palpable effusion right elbow with associated tenderness laterally. Cyst like soft tissue swelling, nontender over the posterior right elbow. Nontender. Limited movement at the right elbow due to pain elicited with attempt at full extension  Skin: Warm and dry.  Patchy erythema proximally right elbow into right lateral bicep region. Superficial abrasions distal elbow region without surrounding erythema.   Neuro: Alert and oriented. Responds appropriately to all questions and commands. No focal findings appreciated. Normal muscle tone. Sensation intact to light touch over distal RUE dermatomes.  Psych: Normal mood and affect. Pleasant.    Emergency Department Course   ECG:  ECG results from 08/01/22   EKG 12 lead     Value    Systolic Blood Pressure     Diastolic Blood Pressure     Ventricular Rate 46    Atrial Rate 62    SC Interval     QRS Duration 142        QTc 448    P Axis 59    R AXIS -64    T Axis 71    Interpretation ECG      Sinus rhythm with A-V dissociation and Wide QRS rhythm with Premature ventricular complexes or Fusion complexes  Left axis deviation  Right  bundle branch block  Inferior infarct , age undetermined  Abnormal ECG  When compared with ECG of 10-FEB-2020 16:16,  Wide QRS rhythm has replaced Sinus rhythm  Vent. rate has decreased BY  35 BPM  Confirmed by GENERATED REPORT, COMPUTER ([id number]),  Cece Haro ([id number]) on 8/1/2022 12:16:53 PM     EKG 12 lead     Value    Systolic Blood Pressure     Diastolic Blood Pressure     Ventricular Rate 39    Atrial Rate 39    DC Interval     QRS Duration 134        QTc 433    P Axis     R AXIS -74    T Axis 80    Interpretation ECG      Sinus bradycardia with A-V dissociation and Idioventricular rhythm  Left axis deviation  Right bundle branch block  Inferior infarct , age undetermined  Abnormal ECG  When compared with ECG of 01-AUG-2022 14:14,  Sinus rhythm is now with A-V dissociation  Confirmed by GENERATED REPORT, COMPUTER ([id number]),  Lorne Colvin ([id number]) on 8/1/2022 5:40:00 PM     EKG 12-lead, tracing only     Value    Systolic Blood Pressure     Diastolic Blood Pressure     Ventricular Rate 43    Atrial Rate 61    DC Interval     QRS Duration 146        QTc 456    P Axis 68    R AXIS -69    T Axis 96    Interpretation ECG      Sinus rhythm with 2nd degree A-V block  Left axis deviation  Right bundle branch block  Inferior infarct , age undetermined  Abnormal ECG  When compared with ECG of 01-AUG-2022 17:34,  There have been no significant changes  Confirmed by MD GABRIEL, ENMA ([id number]),  Uriel Crouch ([id number]) on 8/4/2022 11:34:45 AM     EKG 12-lead, tracing only     Value    Systolic Blood Pressure     Diastolic Blood Pressure     Ventricular Rate 63    Atrial Rate 63    DC Interval 254    QRS Duration 168        QTc 505    P Axis     R AXIS -72    T Axis 8    Interpretation ECG      AV dual-paced rhythm with prolonged AV conduction with frequent atrial-paced complexes and with occasional Premature ventricular complexes  Abnormal ECG  When compared with ECG of 02-AUG-2022 01:17,  (unconfirmed)  Electronic ventricular pacemaker has replaced Wide QRS rhythm  Confirmed by MD GABRIEL, ENMA ([id number]),  Mathieu Regalado ([id number]) on 8/5/2022 10:21:05 AM       *Note: Due to a large number of results and/or encounters for the requested time period, some results have not been displayed. A complete set of results can be found in Results Review.       Laboratory:  Labs Ordered and Resulted from Time of ED Arrival to Time of ED Departure   BASIC METABOLIC PANEL - Abnormal       Result Value    Sodium 137      Potassium 4.0      Chloride 99      Carbon Dioxide (CO2) 24      Anion Gap 14      Urea Nitrogen 20.2      Creatinine 0.95      Calcium 9.2      Glucose 115 (*)     GFR Estimate 80     CBC WITH PLATELETS AND DIFFERENTIAL - Abnormal    WBC Count 13.8 (*)     RBC Count 4.22 (*)     Hemoglobin 12.6 (*)     Hematocrit 39.4 (*)     MCV 93      MCH 29.9      MCHC 32.0      RDW 13.0      Platelet Count 259      % Neutrophils 78      % Lymphocytes 9      % Monocytes 13      % Eosinophils 0      % Basophils 0      % Immature Granulocytes 0      NRBCs per 100 WBC 0      Absolute Neutrophils 10.7 (*)     Absolute Lymphocytes 1.2      Absolute Monocytes 1.8 (*)     Absolute Eosinophils 0.0      Absolute Basophils 0.0      Absolute Immature Granulocytes 0.1      Absolute NRBCs 0.0     ERYTHROCYTE SEDIMENTATION RATE AUTO - Abnormal    Erythrocyte Sedimentation Rate 30 (*)    CRP INFLAMMATION - Abnormal    CRP Inflammation 178.71 (*)    CELL COUNT BODY FLUID - Abnormal    Color Yellow      Clarity Cloudy (*)     Total Nucleated Cells 87,610      Cell Count Fluid Source Elbow, Right     INR - Abnormal    INR 1.50 (*)    GLUCOSE FLUID    Glucose Fluid Source Elbow, Right      Glucose fluid 29     PROTEIN FLUID    Protein Fluid Source Elbow, Right      Protein Total Fluid 5.2     DIFERENTIAL BODY FLUID    % Neutrophils 91      % Lymphocytes 2      % Monocyte/Macrophages 7     CRYSTAL ID SYNOVIAL FLUID   COVID-19  VIRUS (CORONAVIRUS) BY PCR   CBC WITH PLATELETS   GRAM STAIN   AEROBIC BACTERIAL CULTURE ROUTINE   BLOOD CULTURE   BLOOD CULTURE   CELL COUNT WITH DIFFERENTIAL FLUID        Procedures      Arthrocentesis     Procedure: Arthrocentesis    Indication: Joint Pain, Erythema and Joint Swelling    Consent: Written from Patient    Universal Protocol: Universal protocol was followed and time out conducted just prior to starting procedure, confirming patient identity, site/side, procedure, patient position, and availability of correct equipment and implants.     Location: Right Elbow    Preparation: Povidone-iodine    Anesthesia/Sedation: Lidocaine - 1%    Procedure Detail: The site was prepped and draped in the usual fashion.  A 19 gauge needle was used via the lateral approach.  5 mL appearing joint fluid was withdrawn. The fluid was cloudy.    Patient Status: The patient tolerated the procedure well: Yes. There were no complications.      Emergency Department Course & Assessments:    Interventions:   Vancomycin 2500mg IV  Cefepime 2gm IV  Oxycodone 5mg po  Vitamin K 5mg po      Consultations/Discussion of Management or Tests:    ED Course as of 01/06/23 2130 Fri Jan 06, 2023 1838 I rechecked the patient.   2059 I spoke with Dr. Christine, orthopedics, regarding the patient.       Disposition:  The patient was admitted to the hospital under the care of Dr. Contreras.     Impression & Plan      Medical Decision Making:  Fausto Farr is a 82 year old male anticoagulated with h/o past MRSA infections and septic arthritis of the left knee who presents for evaluation of right elbow joint swelling.  There is a history of trauma preceding the pain.  A broad differential for the swelling was of course considered.   Xrays were reviewed from outpatient facility without worrisome acute findings.    Given his history, delayed swelling, severity of pain, leukocytosis, and erythema, an arthrocentesis was performed given  risks/benefits.  The results indicate septic arthritis of the right elbow.  Orthopedics consulted while in the ED.  Agreed with plan of broad-spectrum antibiotic and admission.  Will go to the OR tomorrow morning.  Anticoagulation held and vitamin K administered at the request of orthopedic physician.  Will likely cover with heparin and discontinue just prior to surgery given history of aortic valve replacement.  Patient was comfortable with this plan after discussion.  All questions were answered.    Diagnosis:    ICD-10-CM    1. Pyogenic arthritis of right elbow, due to unspecified organism (H)  M00.9       2. Anticoagulated on Coumadin  Z79.01               Scribe Disclosure:  DIPAK REECE, am serving as a scribe at 6:29 PM on 1/6/2023 to document services personally performed by Kristina Chávez MD based on my observations and the provider's statements to me.            Kristina Cáhvez MD  01/06/23 [id number]

## 2023-01-07 NOTE — H&P
[facility]    History and Physical - Hospitalist Service       Date of Admission:  1/6/2023    Assessment & Plan      Fausto Farr is a 82 year old male admitted on 1/6/2023 with septic right elbow. 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  He fell in his driveway on 1/2/2023.  He had right shoulder pain as well as some back pain.  He went to the urgency room for evaluation and had unremarkable x-rays.  Yesterday he developed right elbow pain.  He went to [place] urgent care for evaluation and was referred to the emergency department.    Emergency department evaluation showed stable vital signs.  Laboratory evaluation showed white blood cells 13.8, C-reactive protein 178.71, and INR 1.5.  Right elbow is erythematous and warm.  X-ray of elbow was unremarkable. Arthrocentesis was performed.  The fluid showed 87,610 nucleated cells, 91% of which were neutrophils.  X-ray of elbow was unremarkable.  He was given cefepime and vancomycin.  Orthopedic surgery was consulted by phone and recommended vitamin K and n.p.o. after midnight in preparation for surgery tomorrow morning.  I was asked to admit Ralph for further cares.    Problem list:    Septic right elbow  Chronic anticoagulation with warfarin for mechanical aortic and mitral valve replacements  -Admit as inpatient  -Start heparin drip, low intensity without bolus, and run until 3:00 this morning  been stopped in anticipation of early surgery; that should give 4 or 5 hours of heparin before surgery  -Resume heparin drip after surgery once okay with orthopedics  -Cefepime and vancomycin was given in the emergency department.  I have ordered Ancef and vancomycin  -Orthopedic surgery consult  -Infectious disease consult  -Analgesics and antiemetics as needed    Coronary artery disease  Previous bypass surgery  Diastolic congestive heart failure without acute exacerbation  Hypertension  TIA  -Resume prior to admission metoprolol and Crestor    Mechanical aortic valve replacement  Mechanical mitral valve replacement  Paroxysmal atrial fibrillation  Chronic anticoagulation with warfarin  Subtherapeutic INR 1.5  -Was given vitamin K in the emergency department  -Heparin drip until 3 in the morning in anticipation of early surgery  -Resume heparin drip and warfarin once okay with orthopedics; will need these orders    Previous abdominal aortic aneurysm repair  Peripheral vascular disease  Mesenteric artery insufficiency  -No acute concerns    History of infected total knee arthroplasty  On chronic antibiotics depression with Augmentin  -Hold Augmentin with current antibiotics    Ulcerative colitis  -Resume prior to admission mesalamine    Benign prostatic hypertrophy  -Resume prior to admission Flomax     Diet: NPO per Anesthesia Guidelines for Procedure/Surgery Except for: Meds    DVT Prophylaxis: heparin drip for now and after surgery; warfarin after surgery  Lord Catheter: Not present  Lines: None     Cardiac Monitoring: None  Code Status: Full Code      Clinically Significant Risk Factors Present on Admission               # Drug Induced Coagulation Defect: home medication list includes an anticoagulant medication                 Disposition Plan      Expected Discharge Date: 01/08/2023                  Eddi Contreras MD  Hospitalist Service  [facility]  Securely message with [employer]  (more info)  Text page via [facility] Paging/Directory     ______________________________________________________________________    Chief Complaint   Right elbow pain with swelling and erythema    History is obtained from the patient, Dr. Chávez, and the medical record    History of Present Illness   Fausto Farr is a 82 year old male admitted on 1/6/2023 with septic right elbow. 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  He fell in his driveway on 1/2/2023.  He had right shoulder pain as well as some back pain.  He went to the urgency room for evaluation and had unremarkable x-rays.  Yesterday he developed right elbow pain.  He went to [place] urgent care for evaluation and was referred to the emergency department.    Emergency department evaluation showed stable vital signs.  Laboratory evaluation showed white blood cells 13.8, C-reactive protein 178.71, and INR 1.5.  Right elbow is erythematous and warm.  X-ray of elbow was unremarkable. Arthrocentesis was performed.  The fluid showed 87,610 nucleated cells, 91% of which were neutrophils.  X-ray of elbow was unremarkable.  He was given cefepime and vancomycin.  Orthopedic surgery was consulted by phone and recommended vitamin K and n.p.o. after midnight in preparation for surgery tomorrow morning.  I was asked to admit Ralph for further cares.      Past Medical History    Past Medical History:   Diagnosis Date      Abdominal pain      Anemia     currently taking iron     Back pain     since 1980     BPH (benign prostatic hyperplasia)      Bruit      CAD (coronary artery disease)      Cellulitis 10/18/2012     Cellulitis 05/2018    GrpB strep LLE cellulitis  negative RACHAEL for veg     Chronic venous insufficiency     bilat lower extremities     Contact dermatitis and other eczema, due to unspecified cause      Diaphragmatic hernia without mention of obstruction or gangrene      Diastolic HF (heart failure) (H)      Gastric ulcer      Hypertension 08/06/2013     Lumbago      Mesenteric artery insufficiency (H)     known AAA and narrowing of intestinal artery's  followed by dr raymond     Metabolic syndrome      Mitral valve disease     s/p mechanical MVR, prior mech AVR     Nonallopathic lesion of lumbar region      Nonallopathic lesion of rib cage      Nonallopathic lesion of sacral region      GAGE (obstructive sleep apnea)     CPAP     Paroxysmal atrial fibrillation (H) 10/18/2012    occured after stopping BB  (prior  aflutter ablation)     Prostate cancer (H) 2008    radiation seed, XRT      PVD (peripheral vascular disease) (H)      Rotator cuff strain     and sprain     S/P AAA repair      S/P aortic valve replacement 2006    developed perivalve leak and MS, therefore redo surg 2013     S/P CABG (coronary artery bypass graft) 2006    Lima-Lad, RA-Rca     Sciatica of left side     since 2000     Sepsis due to group B Streptococcus (H) 05/19/2018     TIA (transient ischemic attack) 8/1/2022     Total knee replacement status 7/22/2019     Ulcerative colitis (H)      Varicose veins of bilateral lower extremities with other complications     s/p RLE vein stripping     Vitamin D deficiency        Past Surgical History   Past Surgical History:   Procedure Laterality Date     AORTIC VALVE REPLACEMENT  1/3/06    redo AVR SJM 21mm and SJM MVR 27mm in 2013SJ 21([id number]):AVR, SJM 27 :MVR-     APPLY WOUND VAC N/A 11/12/2019     Procedure: APPLICATION, WOUND VAC;  Surgeon: Sara Martinez MD;  Location:  OR     ARTHROPLASTY KNEE      right knee     ARTHROPLASTY KNEE Right 7/22/2019    Procedure: Right total knee arthroplasty;  Surgeon: Prasanth Jensen MD;  Location:  OR     BACK SURGERY  Oct 2015    Fusion L4-5, laminectomy L2, L3     BYPASS GRAFT ARTERY CORONARY  10/2013    reimplantation of radial artery graft to RCA     CARDIAC CATHERIZATION  11/2005    Stent placed to RCA     CARDIAC CATHERIZATION  04/2013    Occluded RCA, patent LIMA to LAD and radial graft to PDA     CARPAL TUNNEL RELEASE RT/LT  1994     COLONOSCOPY  8-22-11     CYSTOSCOPY FLEXIBLE  10/16/2013    Procedure: CYSTOSCOPY FLEXIBLE;  FLEXIBLE CYSTOSCOPY / DILATION OF URETHRA / INSERTION OF LESLIE;  Surgeon: Cooper Wallace MD;  Location:  OR     ENDOVASCULAR REPAIR, INFRARENAL ABDOMINAL AORTIC ANEURYSM/DISSECTION; MODULAR BIFURCATED PROSTHESIS  2006    AAA repair endovascular     ENT SURGERY       EP ABLATION ATRIAL FLUTTER N/A 4/22/2019    Procedure: EP Ablation Atrial Flutter;  Surgeon: Jessy Vasquez MD;  Location:  HEART CARDI[facility] CATH LAB     EP PACEMAKER DEVICE & LEAD IMPLANT- RIGHT ATRIAL & RIGHT VENTRICULAR N/A 8/2/2022    Procedure: Pacemaker Device & Lead Implant - Right Atrial & Right Ventricular;  Surgeon: Jessy Vasquez MD;  Location:  HEART CARDIAC CATH LAB     GENITOURINARY SURGERY  6/16/08    radioactive seeding     GRAFT FLAP PEDICLE EXTREMITY (LOCATION) Right 11/12/2019    Procedure: SURGICAL PROCUREMENT, FLAP, PEDICLE, EXTREMITY;  Surgeon: Sara Martinez MD;  Location:  OR     GRAFT SKIN SPLIT THICKNESS FROM EXTREMITY Right 11/12/2019    Procedure: RIGHT GASTRONEMIUS FLAP TO RIGHT KNEE, SPLIT THICKNESS SKIN GRAFT FROM RIGHT THIGH TO RIGHT KNEE, SURGICAL PROCUREMENT, FLAP, PEDICLE, EXTREMITY, WOUND VAC PLACEMENT;  Surgeon: Sara Martinez MD;  Location:  OR     HEAD & NECK SURGERY   1997    vocal cord polypectomy     INCISION AND DRAINAGE KNEE, COMBINED Right 8/29/2019    Procedure: INCISION AND DRAINAGE, KNEE W/ Secondary Wound Closure;  Surgeon: Prasanth Jensen MD;  Location: [facility] OR     IRRIGATION AND DEBRIDEMENT KNEE, PLACE ANTIBIOTIC CEMENT BEADS / SPACE Right 2/12/2020    Procedure: 1. Irrigation and debridement of wound breakdown, right total knee arthroplasty.  2. Irrigation and debridement of right total knee with placement of antibiotic-impregnated (vancomycin) absorbable antibiotic beads.;  Surgeon: Prasanth Jensen MD;  Location: [facility] OR     IRRIGATION AND DEBRIDEMENT LOWER EXTREMITY, COMBINED Right 12/8/2019    Procedure: DEBRIDEMENT OF RIGHT CALF AND WOUND CLOSURE.;  Surgeon: Sara Martinez MD;  Location:  OR     KNEE SURGERY  2001 Right knee arthroscopy     OPTICAL TRACKING SYSTEM FUSION SPINE POSTERIOR LUMBAR THREE+ LEVELS N/A 10/29/2015    Procedure: OPTICAL TRACKING SYSTEM FUSION SPINE POSTERIOR LUMBAR THREE+ LEVELS;  Surgeon: Walt Garcia MD;  Location:  OR     PROSTATE SURGERY      radioactive seeding 6/16/08     REPAIR ANEURYSM ABDOMINAL AORTA  06/08     REPAIR VALVE MITRAL  10/16/2013    SJM 21([id number]):AVR, SJM 27 :MVRProcedure: REPAIR VALVE MITRAL;  REDO STERNOTOMY/REDO AORTIC VALVE REPLACEMENT/ MITRAL VALVE REPLACEMENT/REIMPLANTATION OF RIGHT CORONARY ARTERY BYPASS WITH RACHAEL ( ON PUMP);  Surgeon: Viet Singh MD;  Location:  OR     REPLACE VALVE AORTIC  10/16/2013    Procedure: REPLACE VALVE AORTIC;;  Surgeon: Viet Singh MD;  Location:  OR     SURGERY GENERAL IP CONSULT  05/2008 Excision aneurysm abdominal aorta     SURGERY GENERAL IP CONSULT  1997 Vocal cord polypectomy     VASCULAR SURGERY  1968, 1993     varicose vein stripping     [facility] CABG, VEIN, TWO  1/3/06    Left radial to RCA, LIMA to LAD (RA to RCA reimplanted at time of 2013 surg)       Prior to Admission Medications   Prior to Admission Medications    Prescriptions Last Dose Informant Patient Reported? Taking?   HYDROcodone-acetaminophen (NORCO) 5-325 MG tablet   No No   Sig: Take 1 tablet by mouth every 6 hours as needed for pain   acetaminophen (TYLENOL) 500 MG tablet   Yes No   Sig: Take 500-1,000 mg by mouth every 6 hours as needed for pain   amoxicillin-clavulanate (AUGMENTIN) 875-125 MG tablet   Yes No   Sig: Take 1 tablet by mouth daily   betamethasone valerate (VALISONE) 0.1 % external lotion   No No   Sig: Apply topically 2 times daily Apply topically to scalp twice daily PRN   cholecalciferol 25 MCG (1000 UT) TABS  Self Yes No   Sig: Take 1,000 Units by mouth daily   ezetimibe (ZETIA) 10 MG tablet   No No   Sig: Take 1 tablet (10 mg) by mouth daily   ferrous sulfate (FEROSUL) 325 (65 Fe) MG tablet  Self Yes No   Sig: Take 325 mg by mouth daily (with breakfast)   fluocinonide (LIDEX) 0.05 % external ointment   No No   Sig: Apply topically 2 times daily   Patient taking differently: Apply topically 2 times daily as needed   glucosamine-chondroitin 500-400 MG CAPS per capsule  Self Yes No   Sig: Take 1 capsule by mouth every evening   mesalamine (ASACOL HD) 800 MG EC tablet   No No   Sig: Take 1 tablet (800 mg) by mouth 2 times daily   metoprolol succinate ER (TOPROL XL) 50 MG 24 hr tablet   No No   Sig: Take 1 1/2 tab (75mg) daily   rosuvastatin (CRESTOR) 40 MG tablet   No No   Sig: Take 1 tablet (40 mg) by mouth daily   tamsulosin (FLOMAX) 0.4 MG capsule   No No   Sig: Take 1 capsule (0.4 mg) by mouth daily   triamcinolone (KENALOG) 0.1 % external cream   No No   Sig: Apply topically 2 times daily   Patient taking differently: Apply topically 2 times daily as needed   triamcinolone (KENALOG) 0.1 % external lotion   No No   Sig: Apply topically 2 times daily   Patient taking differently: Apply topically 2 times daily as needed   warfarin ANTICOAGULANT (COUMADIN) 5 MG tablet   No No   Sig: Take 5mg every Sun & Wed / Take 7.5mg all other days OR per INR  clinic      Facility-Administered Medications: None        Review of Systems    The 10 point Review of Systems is negative other than noted in the HPI or here.     Social History   I have reviewed this patient's social history and updated it with pertinent information if needed.  Social History     Tobacco Use     Smoking status: Former     Packs/day: 1.00     Years: 40.00     Pack years: 40.00     Types: Cigarettes     Start date: 4/15/1962     Quit date: 10/23/2002     Years since quittin.2     Smokeless tobacco: Never   Vaping Use     Vaping Use: Never used   Substance Use Topics     Alcohol use: Yes     Comment: a couple beers per week (socially)     Drug use: No       Family History   I have reviewed this patient's family history and updated it with pertinent information if needed.  Family History   Problem Relation Age of Onset     No Known Problems Mother      Coronary Artery Disease Father         CABG     Heart Disease Father         Pacemaker     No Known Problems Brother      No Known Problems Sister      No Known Problems Son      Other Cancer Daughter      No Known Problems Daughter      Heart Disease Brother      Other - See Comments Grandchild        Allergies   Allergies   Allergen Reactions     Bees Anaphylaxis        Physical Exam   Vital Signs: Temp: 98.2  F (36.8  C)   BP: (!) 143/79 Pulse: 59   Resp: 18 SpO2: 95 % O2 Device: None (Room air)    Weight: 0 lbs 0 oz    GENERAL: Pleasant and cooperative. No acute distress.  EYES: Pupils equal and round. No scleral erythema or icterus.  ENT: External ears are normal without deformity. Posterior oropharynx is without erythem, swelling, or exudate.  NECK: Supple. No masses or swelling. No tenderness. Thyroid is normal without mass or tenderness.  CHEST: Clear to auscultation. Normal breath sounds. No retractions.   CV: Regular rate and rhythm. No JVD. Pulses normal.  ABDOMEN: Bowel sounds present. No tenderness. No masses or hernia.  EXTREMETIES:  No clubbing, cyanosis, or ischemia. Right elbow with pain and erythema  SKIN: Warm and dry to touch. No wounds or rashes.  NEUROLOGIC: Strength and sensation are normal. Deep tendon reflexes are normal. Cranial nerves are normal.        Medical Decision Making       60 MINUTES SPENT BY ME on the date of service doing chart review, history, exam, documentation & further activities per the note.      Data     I have personally reviewed the following data over the past 24 hrs:    13.0 (H)  \   11.4 (L)   / 250     137 99 20.2 /  115 (H)   4.0 24 0.95 \       Procal: N/A CRP: 178.71 (H) Lactic Acid: N/A       INR:  1.50 (H) PTT:  N/A   D-dimer:  N/A Fibrinogen:  N/A       Imaging results reviewed over the past 24 hrs:   Recent Results (from the past 24 hour(s))   XR Elbow Right G/E 3 Views    Narrative    ELBOW RIGHT THREE OR MORE VIEWS    1/6/2023 11:44 AM     HISTORY:  Right elbow pain    COMPARISON: None.      Impression    IMPRESSION: Mild osteoarthrosis of the elbow. There is no evidence of  fracture. There is soft tissue calcification about the medial and  lateral aspects of the elbow which may be from long-standing  tendon/ligament/capsule degeneration. A component of chondrocalcinosis  cannot be ruled out. Extensive arterial calcifications.    JUAN CARLOS MOORE MD         SYSTEM ID:  [id number]     Recent Labs   Lab 01/06/23  2333 01/06/23  2111 01/06/23  1541 01/04/23  0929   WBC 13.0*  --  13.8*  --    HGB 11.4*  --  12.6*  --    MCV 93  --  93  --      --  259  --    INR  --  1.50*  --  4.0*   NA  --   --  137  --    POTASSIUM  --   --  4.0  --    CHLORIDE  --   --  99  --    CO2  --   --  24  --    BUN  --   --  20.2  --    CR  --   --  0.95  --    ANIONGAP  --   --  14  --    AWILDA  --   --  9.2  --    GLC  --   --  115*  --

## 2023-01-07 NOTE — ANESTHESIA PREPROCEDURE EVALUATION
Anesthesia Pre-Procedure Evaluation    Patient: Fausto Farr   MRN: [id number] : 1940        Procedure : Procedure(s):  IRRIGATION AND DEBRIDEMENT, RIGHT ELBOW          Past Medical History:   Diagnosis Date     Abdominal pain      Anemia     currently taking iron     Back pain     since      BPH (benign prostatic hyperplasia)      Bruit      CAD (coronary artery disease)      Cellulitis 10/18/2012     Cellulitis 2018    GrpB strep LLE cellulitis  negative RACHAEL for veg     Chronic venous insufficiency     bilat lower extremities     Contact dermatitis and other eczema, due to unspecified cause      Diaphragmatic hernia without mention of obstruction or gangrene      Diastolic HF (heart failure) (H)      Gastric ulcer      Hypertension 2013     Lumbago      Mesenteric artery insufficiency (H)     known AAA and narrowing of intestinal artery's  followed by dr raymond     Metabolic syndrome      Mitral valve disease     s/p mechanical MVR, prior [facility] AVR     Nonallopathic lesion of lumbar region      Nonallopathic lesion of rib cage      Nonallopathic lesion of sacral region      GAGE (obstructive sleep apnea)     CPAP     Paroxysmal atrial fibrillation (H) 10/18/2012    occured after stopping BB  (prior  aflutter ablation)     Prostate cancer (H)     radiation seed, XRT      PVD (peripheral vascular disease) (H)      Rotator cuff strain     and sprain     S/P AAA repair      S/P aortic valve replacement     developed perivalve leak and MS, therefore redo surg      S/P CABG (coronary artery bypass graft)     Lima-Lad, RA-Rca     Sciatica of left side     since      Sepsis due to group B Streptococcus (H) 2018     TIA (transient ischemic attack) 2022     Total knee replacement status 2019     Ulcerative colitis (H)      Varicose veins of bilateral lower extremities with other complications     s/p RLE vein stripping     Vitamin D deficiency       Past Surgical  History:   Procedure Laterality Date     AORTIC VALVE REPLACEMENT  1/3/06    redo AVR SJM 21mm and SJM MVR 27mm in 2013SJM 21(AGFN [id number]):AVR, SJM 27 :MVR-     APPLY WOUND VAC N/A 11/12/2019    Procedure: APPLICATION, WOUND VAC;  Surgeon: Sara Martinez MD;  Location:  OR     ARTHROPLASTY KNEE      right knee     ARTHROPLASTY KNEE Right 7/22/2019    Procedure: Right total knee arthroplasty;  Surgeon: Prasanth Jensen MD;  Location: [facility] OR     BACK SURGERY  Oct 2015    Fusion L4-5, laminectomy L2, L3     BYPASS GRAFT ARTERY CORONARY  10/2013    reimplantation of radial artery graft to RCA     CARDIAC CATHERIZATION  11/2005    Stent placed to RCA     CARDIAC CATHERIZATION  04/2013    Occluded RCA, patent LIMA to LAD and radial graft to PDA     CARPAL TUNNEL RELEASE RT/LT  1994     COLONOSCOPY  8-22-11     CYSTOSCOPY FLEXIBLE  10/16/2013    Procedure: CYSTOSCOPY FLEXIBLE;  FLEXIBLE CYSTOSCOPY / DILATION OF URETHRA / INSERTION OF LESLIE;  Surgeon: Cooper Wallace MD;  Location:  OR     ENDOVASCULAR REPAIR, INFRARENAL ABDOMINAL AORTIC ANEURYSM/DISSECTION; MODULAR BIFURCATED PROSTHESIS  2006    AAA repair endovascular     ENT SURGERY       EP ABLATION ATRIAL FLUTTER N/A 4/22/2019    Procedure: EP Ablation Atrial Flutter;  Surgeon: Jessy Vasquez MD;  Location:  HEART CARDI[facility] CATH LAB     EP PACEMAKER DEVICE & LEAD IMPLANT- RIGHT ATRIAL & RIGHT VENTRICULAR N/A 8/2/2022    Procedure: Pacemaker Device & Lead Implant - Right Atrial & Right Ventricular;  Surgeon: Jessy Vasquez MD;  Location:  HEART CARDIAC CATH LAB     GENITOURINARY SURGERY  6/16/08    radioactive seeding     GRAFT FLAP PEDICLE EXTREMITY (LOCATION) Right 11/12/2019    Procedure: SURGICAL PROCUREMENT, FLAP, PEDICLE, EXTREMITY;  Surgeon: Sara Martinez MD;  Location:  OR     GRAFT SKIN SPLIT THICKNESS FROM EXTREMITY Right 11/12/2019    Procedure: RIGHT GASTRONEMIUS FLAP TO RIGHT KNEE, SPLIT  THICKNESS SKIN GRAFT FROM RIGHT THIGH TO RIGHT KNEE, SURGICAL PROCUREMENT, FLAP, PEDICLE, EXTREMITY, WOUND VAC PLACEMENT;  Surgeon: Sara Martinez MD;  Location:  OR     HEAD & NECK SURGERY  1997    vocal cord polypectomy     INCISION AND DRAINAGE KNEE, COMBINED Right 8/29/2019    Procedure: INCISION AND DRAINAGE, KNEE W/ Secondary Wound Closure;  Surgeon: Prasanth Jensen MD;  Location: [facility] OR     IRRIGATION AND DEBRIDEMENT KNEE, PLACE ANTIBIOTIC CEMENT BEADS / SPACE Right 2/12/2020    Procedure: 1. Irrigation and debridement of wound breakdown, right total knee arthroplasty.  2. Irrigation and debridement of right total knee with placement of antibiotic-impregnated (vancomycin) absorbable antibiotic beads.;  Surgeon: Prasanth Jensen MD;  Location: [facility] OR     IRRIGATION AND DEBRIDEMENT LOWER EXTREMITY, COMBINED Right 12/8/2019    Procedure: DEBRIDEMENT OF RIGHT CALF AND WOUND CLOSURE.;  Surgeon: Sara Martinez MD;  Location:  OR     KNEE SURGERY  2001 Right knee arthroscopy     OPTICAL TRACKING SYSTEM FUSION SPINE POSTERIOR LUMBAR THREE+ LEVELS N/A 10/29/2015    Procedure: OPTICAL TRACKING SYSTEM FUSION SPINE POSTERIOR LUMBAR THREE+ LEVELS;  Surgeon: Walt Garcia MD;  Location:  OR     PROSTATE SURGERY      radioactive seeding 6/16/08     REPAIR ANEURYSM ABDOMINAL AORTA  06/08     REPAIR VALVE MITRAL  10/16/2013    SJM 21(AGFN 756):AVR, [facility] 27  501:MVRProcedure: REPAIR VALVE MITRAL;  REDO STERNOTOMY/REDO AORTIC VALVE REPLACEMENT/ MITRAL VALVE REPLACEMENT/REIMPLANTATION OF RIGHT CORONARY ARTERY BYPASS WITH RACHAEL ( ON PUMP);  Surgeon: Viet Singh MD;  Location:  OR     REPLACE VALVE AORTIC  10/16/2013    Procedure: REPLACE VALVE AORTIC;;  Surgeon: Viet Singh MD;  Location:  OR     SURGERY GENERAL IP CONSULT  05/2008 Excision aneurysm abdominal aorta     SURGERY GENERAL IP CONSULT  1997 Vocal cord polypectomy     VASCULAR SURGERY  1968, 1993     varicose  vein stripping     [facility] CABG, VEIN, TWO  1/3/06    Left radial to RCA, LIMA to LAD (RA to RCA reimplanted at time of 2013 surg)      Allergies   Allergen Reactions     Bees Anaphylaxis      Social History     Tobacco Use     Smoking status: Former     Packs/day: 1.00     Years: 40.00     Pack years: 40.00     Types: Cigarettes     Start date: 4/15/1962     Quit date: 10/23/2002     Years since quittin.2     Smokeless tobacco: Never   Substance Use Topics     Alcohol use: Yes     Comment: a couple beers per week (socially)      Wt Readings from Last 1 Encounters:   23 97.4 kg (214 lb 12.8 oz)        Anesthesia Evaluation            ROS/MED HX  ENT/Pulmonary:     (+) sleep apnea,     Neurologic:     (+) CVA (occipital stroke detected on MRI 2022),     Cardiovascular: Comment: H/o CABG, mechanical mitral and aortic valves, AAA repair    Device check 22  [employer] (D) Remote PPM Device Check  AP: 16%  : 86%  Mode: DDD   Presenting Rhythm: AS/, AP/ w occ PVC  Heart Rate: adequate rates per histogram   Sensing: stable  Pacing Threshold: stable  Impedance: stable  Battery Status: 13.5 years  Atrial Arrhythmia: 1 mode switch comprising <1% of the time. EGM for review shows As>Vs for an atrial arrhythmia lasting 6 seconds, ventricular rates controlled.   Ventricular Arrhythmia: 4 ventricular high rates logged. EGMs for review show Vs>As for NSVT lasting 10-22 beats, rates 180-190 bpm. Last EF 55-60% on 22.  Not a new finding.  Will continue to monitor     Care Plan: F/U latitude ppm in 3 months. F/U with Dr. Santo 2023. SHA with results.  Violeta. CVT    Echo 2022  Interpretation Summary     There is moderate concentric left ventricular hypertrophy.  Left ventricular systolic function is normal.  The visual ejection fraction is 55-60%.  The right ventricle is borderline dilated.  The right ventricular systolic function is normal.  Mild (35-45mmHg) pulmonary  hypertension is present.  Mechanical steven valve prosthesis (21 mm St Solo). Well seated. Mean gradient  6 mmHg at HR 71 bpm. MR not appreciated due to acoustic shadowing.  Mechanical aortic valve prosthesis (27 mm St. Solo). Well seated with no  valvular or perivalvular AI. Mean gradient of 11 mmHg. Vmax 2.5 m/sec. AT <  100 msec. Normal Doppler interrogation for this valve.  The inferior vena cava was normal in size with preserved respiratory  variability.  There is no pericardial effusion.     Findings similar to study dated 7/2/2020. Gradients across both mechanical  prostheses are slightly increased compared to prior however still within  normal limits for these valve types.    (+) hypertension--CAD ---Taking blood thinners (warfarin s/p vitamin K on admission. On heparin gtt inpatient) CHF pacemaker, - Patient is dependent on pacemaker. dysrhythmias (afib s/p ablation), valvular problems/murmurs     METS/Exercise Tolerance:     Hematologic:       Musculoskeletal: Comment: Septic right elbow        GI/Hepatic: Comment: Gastric ulcer      (+) Inflammatory bowel disease (ulcerative colitis),     Renal/Genitourinary:       Endo:     (+) type II DM, Last HgA1c: 6.5, Obesity (BMI 34),     Psychiatric/Substance Use:       Infectious Disease:       Malignancy:       Other:            Physical Exam    Airway        Mallampati: II   TM distance: > 3 FB   Neck ROM: full     Respiratory Devices and Support         Dental           Cardiovascular   cardiovascular exam normal          Pulmonary   pulmonary exam normal                OUTSIDE LABS:  CBC:   Lab Results   Component Value Date    WBC 11.2 (H) 01/07/2023    WBC 13.0 (H) 01/06/2023    HGB 11.1 (L) 01/07/2023    HGB 11.4 (L) 01/06/2023    HCT 36.1 (L) 01/07/2023    HCT 35.4 (L) 01/06/2023     01/07/2023     01/06/2023     BMP:   Lab Results   Component Value Date     01/07/2023     01/06/2023    POTASSIUM 3.8 01/07/2023    POTASSIUM 4.0  01/06/2023    CHLORIDE 101 01/07/2023    CHLORIDE 99 01/06/2023    CO2 25 01/07/2023    CO2 24 01/06/2023    BUN 14.7 01/07/2023    BUN 20.2 01/06/2023    CR 0.74 01/07/2023    CR 0.95 01/06/2023     (H) 01/07/2023     (H) 01/06/2023     COAGS:   Lab Results   Component Value Date    PTT 37 10/16/2013    INR 1.50 (H) 01/06/2023    FIBR 229 10/16/2013     POC:   Lab Results   Component Value Date    BGM 92 12/10/2019     HEPATIC:   Lab Results   Component Value Date    ALBUMIN 4.0 07/27/2022    PROTTOTAL 7.5 07/27/2022    ALT 28 07/27/2022    AST 27 07/27/2022    ALKPHOS 56 07/27/2022    BILITOTAL 0.4 07/27/2022     OTHER:   Lab Results   Component Value Date    PH 7.34 (L) 10/17/2013    LACT 1.4 05/18/2018    A1C 6.5 (H) 07/27/2022    AWILDA 8.7 (L) 01/07/2023    PHOS 3.9 11/13/2019    MAG 2.1 08/03/2022    TSH 0.54 11/13/2019    T4 0.79 11/21/2005    .71 (H) 01/06/2023    SED 30 (H) 01/06/2023       Anesthesia Plan    ASA Status:  3   NPO Status:  NPO Appropriate    Anesthesia Type: General.     - Airway: LMA   Induction: Intravenous.   Maintenance: Balanced.        Consents    Anesthesia Plan(s) and associated risks, benefits, and realistic alternatives discussed. Questions answered and patient/representative(s) expressed understanding.    - Discussed:     - Discussed with:  Patient         Postoperative Care    Pain management: Oral pain medications, IV analgesics, Multi-modal analgesia.   PONV prophylaxis: Ondansetron (or other 5HT-3), Dexamethasone or Solumedrol     Comments:    Other Comments: Discussed increased risk of stroke in surgical patients for 9 months following stroke    Will place magnet over pacemaker intraoperatively            Angelika Rogel MD

## 2023-01-07 NOTE — ANESTHESIA PROCEDURE NOTES
Airway       Patient location during procedure: OR  Staff -        Anesthesiologist:  Angelika Rogel MD       CRNA: Severson, Dean Dennis, APRN CRNA       Performed By: CRNA  Consent for Airway        Urgency: elective  Indications and Patient Condition       Indications for airway management: navin-procedural and airway protection       Induction type:intravenous       Mask difficulty assessment: 1 - vent by mask    Final Airway Details       Final airway type: supraglottic airway    Supraglottic Airway Details        Type: LMA       Brand: I-Gel       LMA size: 5    Post intubation assessment        Placement verified by: capnometry, equal breath sounds and chest rise        Number of attempts at approach: 1       Number of other approaches attempted: 0       Secured with: commercial tube [email or web address]       Ease of procedure: easy       Dentition: Intact and Unchanged       Since I have so many openings I can see the patient today if possible instead of waiting till 9/24.  Video visit is also ok by me unknown, pending collateral

## 2023-01-07 NOTE — PHARMACY-VANCOMYCIN DOSING SERVICE
Pharmacy Vancomycin Initial Note  Date of Service 2023  Patient's  1940  82 year old, male    Indication: Bone and Joint Infection.  Pt is also on ancef.  Pt is going to OR for I&D today.    Current estimated CrCl = Estimated Creatinine Clearance: 85.5 mL/min (based on SCr of 0.74 mg/dL).    Creatinine for last 3 days  2023:  3:41 PM Creatinine 0.95 mg/dL  2023:  7:07 AM Creatinine 0.74 mg/dL    Recent Vancomycin Level(s) for last 3 days  No results found for requested labs within last 72 hours.      Vancomycin IV Administrations (past 72 hours)                   vancomycin (VANCOCIN) 2,500 mg in sodium chloride 0.9 % 500 mL intermittent infusion (mg) 2,500 mg New Bag 236                Nephrotoxins and other renal medications (From now, onward)    Start     Dose/Rate Route Frequency Ordered Stop    23 1000  vancomycin (VANCOCIN) 1000 mg in dextrose 5% 200 mL PREMIX         1,000 mg  200 mL/hr over 1 Hours Intravenous EVERY 12 HOURS 23 0819            Contrast Orders - past 72 hours (72h ago, onward)    None          [employer] Prediction of Planned Initial Vancomycin Regimen  Regimen: 1000 mg IV every 12 hours.  Start time: 09:16 on 2023  Exposure target: AUC24 (range)400-600 mg/L.hr   AUC24,ss: 486 mg/L.hr  Probability of AUC24 > 400: 70 %  Ctrough,ss: 15.8 mg/L  Probability of Ctrough,ss > 20: 30 %  Probability of nephrotoxicity (Lodise SANTA ): 11 %          Plan:  1. Start vancomycin  1000 mg IV q12h.   2. Vancomycin monitoring method: AUC  3. Vancomycin therapeutic monitoring goal: 400-600 mg*h/L  4. Pharmacy will check vancomycin levels as appropriate in 1-3 Days.    5. Serum creatinine levels will be ordered daily for the first week of therapy and at least twice weekly for subsequent weeks.      Nayeli Hong, [facility]

## 2023-01-07 NOTE — PLAN OF CARE
Pt came up to the floor around 5:10 am. Alert and oriented x4, SBA for transfers. NPO. LR running at 75 ml/hr. Pt voided and had bowel movement around 6 am. Oxycodone given x1 for back, R shoulder, R elbow pain. Plan for surgery to R elbow later on today.

## 2023-01-07 NOTE — CONSULTS
[facility]    Infectious Disease Consultation     Date of Admission:  1/6/2023  Date of Consult : 01/07/23    Assessment:  82 year old male with atrial fibrillation, Coronary artery disease, CABG , mechanical mitral and aortic valves, abdominal AAA repair, pacemaker, ulcerative colitis and prior polymicrobial right TKA infection treated in 2020 who has been hospitalized with right elbow septic arthritis following a fall. Surgical debridement is planned today, and based on culture data antibiotics will be modified. Usual etiologies staph and strep      Recommendations:  1. Surgical debridement is planned today -send operative tissue cxs  2. Follow cx data  3. Continue Vancomycin given prior hx of MRSA infection- discontinue Cefazolin for now, further antibiotic modification based on cx data  4. Will need IV access for long term IV antibiotic therapy for 4 weeks    ID will continue to follow      Mely Rai MD    Reason for Consult    I was asked to evaluate this patient for treatment recommendations for septic arthritis    Primary Care Physician   Chris Flower    Chief Complaint   Right elbow pain    History is obtained from the patient and medical records    History of Present Illness   Fausto Farr is a 82 year old male with history of atrial fibrillation, Coronary artery disease, CABG , mechanical mitral and aortic valves, abdominal AAA repair, pacemaker, ulcerative colitis and prior polymicrobial right TKA infection treated in 2020 who has been hospitalized with right elbow pain and infection following a fall on 1/2.    Patient had leukocytosis of 13.8 K on admission with elevated CRP of 178. His elbow is swollen , red and warm and synovial fluid analysis is suggestive of infection with 87,610 WBC with PMN predominance. He is planned for surgical debridement.    He denies fever or chills, he had some skin breakdwon over his right elbow after he fell with onset of pain and swelling  the following day. Also noted pain in right shoulder and bacow    Past Medical History   I have reviewed this patient's medical history and updated it with pertinent information if needed.   Past Medical History:   Diagnosis Date     Abdominal pain      Anemia     currently taking iron     Back pain     since 1980     BPH (benign prostatic hyperplasia)      Bruit      CAD (coronary artery disease)      Cellulitis 10/18/2012     Cellulitis 05/2018    GrpB strep LLE cellulitis  negative RACHAEL for veg     Chronic venous insufficiency     bilat lower extremities     Contact dermatitis and other eczema, due to unspecified cause      Diaphragmatic hernia without mention of obstruction or gangrene      Diastolic HF (heart failure) (H)      Gastric ulcer      Hypertension 08/06/2013     Lumbago      Mesenteric artery insufficiency (H)     known AAA and narrowing of intestinal artery's  followed by dr raymond     Metabolic syndrome      Mitral valve disease     [facility]     Nonallopathic lesion of lumbar region      Nonallopathic lesion of rib cage      Nonallopathic lesion of sacral region      GAGE (obstructive sleep apnea)     CPAP     Paroxysmal atrial fibrillation (H) 10/18/2012    occured after stopping BB  (prior  aflutter ablation)     Prostate cancer (H) 2008    radiation seed, XRT      PVD (peripheral vascular disease) (H)      Rotator cuff strain     and sprain     S/P AAA repair      S/P aortic valve replacement 2006    developed perivalve leak and MS, therefore redo surg 2013     S/P CABG (coronary artery bypass graft) 2006    Lima-Lad, RA-Rca     Sciatica of left side     since 2000     Sepsis due to group B Streptococcus (H) 05/19/2018     TIA (transient ischemic attack) 8/1/2022     Total knee replacement status 7/22/2019     Ulcerative colitis (H)      Varicose veins of bilateral lower extremities with other complications     s/p RLE vein stripping     Vitamin D deficiency        Past  Surgical History   I have reviewed this patient's surgical history and updated it with pertinent information if needed.  Past Surgical History:   Procedure Laterality Date     AORTIC VALVE REPLACEMENT  1/3/06    redo AVR SJM 21mm and SJM MVR 27mm in 2013SJM 21([id number]):AVR, SJM 27 :MVR-     APPLY WOUND VAC N/A 11/12/2019    Procedure: APPLICATION, WOUND VAC;  Surgeon: Sara Martinez MD;  Location:  OR     ARTHROPLASTY KNEE      right knee     ARTHROPLASTY KNEE Right 7/22/2019    Procedure: Right total knee arthroplasty;  Surgeon: Prasanth Jensen MD;  Location: [facility] OR     BACK SURGERY  Oct 2015    Fusion L4-5, laminectomy L2, L3     BYPASS GRAFT ARTERY CORONARY  10/2013    reimplantation of radial artery graft to RCA     CARDIAC CATHERIZATION  11/2005    Stent placed to RCA     CARDIAC CATHERIZATION  04/2013    Occluded RCA, patent LIMA to LAD and radial graft to PDA     CARPAL TUNNEL RELEASE RT/LT  1994     COLONOSCOPY  8-22-11     CYSTOSCOPY FLEXIBLE  10/16/2013    Procedure: CYSTOSCOPY FLEXIBLE;  FLEXIBLE CYSTOSCOPY / DILATION OF URETHRA / INSERTION OF LESLIE;  Surgeon: Cooper Wallace MD;  Location:  OR     ENDOVASCULAR REPAIR, INFRARENAL ABDOMINAL AORTIC ANEURYSM/DISSECTION; MODULAR BIFURCATED PROSTHESIS  2006    AAA repair endovascular     ENT SURGERY       EP ABLATION ATRIAL FLUTTER N/A 4/22/2019    Procedure: EP Ablation Atrial Flutter;  Surgeon: Jessy Vasquez MD;  Location:  HEART CARDIAC CATH LAB     EP PACEMAKER DEVICE & LEAD IMPLANT- RIGHT ATRIAL & RIGHT VENTRICULAR N/A 8/2/2022    Procedure: Pacemaker Device & Lead Implant - Right Atrial & Right Ventricular;  Surgeon: Jessy Vasquez MD;  Location:  HEART CARDIAC CATH LAB     GENITOURINARY SURGERY  6/16/08    radioactive seeding     GRAFT FLAP PEDICLE EXTREMITY (LOCATION) Right 11/12/2019    Procedure: SURGICAL PROCUREMENT, FLAP, PEDICLE, EXTREMITY;  Surgeon: Sara Martinez MD;  Location:   OR     GRAFT SKIN SPLIT THICKNESS FROM EXTREMITY Right 11/12/2019    Procedure: RIGHT GASTRONEMIUS FLAP TO RIGHT KNEE, SPLIT THICKNESS SKIN GRAFT FROM RIGHT THIGH TO RIGHT KNEE, SURGICAL PROCUREMENT, FLAP, PEDICLE, EXTREMITY, WOUND VAC PLACEMENT;  Surgeon: Sara Martinez MD;  Location:  OR     HEAD & NECK SURGERY  1997    vocal cord polypectomy     INCISION AND DRAINAGE KNEE, COMBINED Right 8/29/2019    Procedure: INCISION AND DRAINAGE, KNEE W/ Secondary Wound Closure;  Surgeon: Prasanth Jensen MD;  Location:  OR     IRRIGATION AND DEBRIDEMENT KNEE, PLACE ANTIBIOTIC CEMENT BEADS / SPACE Right 2/12/2020    Procedure: 1. Irrigation and debridement of wound breakdown, right total knee arthroplasty.  2. Irrigation and debridement of right total knee with placement of antibiotic-impregnated (vancomycin) absorbable antibiotic beads.;  Surgeon: Prasanth Jensen MD;  Location:  OR     IRRIGATION AND DEBRIDEMENT LOWER EXTREMITY, COMBINED Right 12/8/2019    Procedure: DEBRIDEMENT OF RIGHT CALF AND WOUND CLOSURE.;  Surgeon: Sara Martinez MD;  Location:  OR     KNEE SURGERY  2001 Right knee arthroscopy     OPTICAL TRACKING SYSTEM FUSION SPINE POSTERIOR LUMBAR THREE+ LEVELS N/A 10/29/2015    Procedure: OPTICAL TRACKING SYSTEM FUSION SPINE POSTERIOR LUMBAR THREE+ LEVELS;  Surgeon: Walt Garcia MD;  Location:  OR     PROSTATE SURGERY      radioactive seeding 6/16/08     REPAIR ANEURYSM ABDOMINAL AORTA  06/08     REPAIR VALVE MITRAL  10/16/2013    SJ 21(AGFN [id number]):AVR, [facility] 27 :MVRProcedure: REPAIR VALVE MITRAL;  REDO STERNOTOMY/REDO AORTIC VALVE REPLACEMENT/ MITRAL VALVE REPLACEMENT/REIMPLANTATION OF RIGHT CORONARY ARTERY BYPASS WITH RACHAEL ( ON PUMP);  Surgeon: Viet Singh MD;  Location:  OR     REPLACE VALVE AORTIC  10/16/2013    Procedure: REPLACE VALVE AORTIC;;  Surgeon: Viet iSngh MD;  Location:  OR     SURGERY GENERAL IP CONSULT  05/2008 Excision  aneurysm abdominal aorta     SURGERY GENERAL IP CONSULT  1997 Vocal cord polypectomy     VASCULAR SURGERY  1968, 1993     varicose vein stripping     ZZC CABG, VEIN, TWO  1/3/06    Left radial to RCA, LIMA to LAD (RA to RCA reimplanted at time of 2013 surg)       Prior to Admission Medications   Prior to Admission Medications   Prescriptions Last Dose Informant Patient Reported? Taking?   HYDROcodone-acetaminophen (NORCO) 5-325 MG tablet Past Week  No Yes   Sig: Take 1 tablet by mouth every 6 hours as needed for pain   acetaminophen (TYLENOL) 500 MG tablet   Yes Yes   Sig: Take 500-1,000 mg by mouth every 6 hours as needed for pain   amoxicillin-clavulanate (AUGMENTIN) 875-125 MG tablet 1/6/2023  Yes Yes   Sig: Take 1 tablet by mouth daily   betamethasone valerate (VALISONE) 0.1 % external lotion Past Month  No Yes   Sig: Apply topically 2 times daily Apply topically to scalp twice daily PRN   cholecalciferol 25 MCG (1000 UT) TABS 1/6/2023 Self Yes Yes   Sig: Take 1,000 Units by mouth daily   ezetimibe (ZETIA) 10 MG tablet 1/6/2023  No Yes   Sig: Take 1 tablet (10 mg) by mouth daily   ferrous sulfate (FEROSUL) 325 (65 Fe) MG tablet 1/6/2023 Self Yes Yes   Sig: Take 325 mg by mouth daily (with breakfast)   fluocinonide (LIDEX) 0.05 % external ointment More than a month  No Yes   Sig: Apply topically 2 times daily   Patient taking differently: Apply topically 2 times daily as needed   glucosamine-chondroitin 500-400 MG CAPS per capsule 1/6/2023 Self Yes Yes   Sig: Take 1 capsule by mouth every evening   mesalamine (ASACOL HD) 800 MG EC tablet 1/6/2023  No Yes   Sig: Take 1 tablet (800 mg) by mouth 2 times daily   metoprolol succinate ER (TOPROL XL) 50 MG 24 hr tablet 1/6/2023  No Yes   Sig: Take 1 1/2 tab (75mg) daily   rosuvastatin (CRESTOR) 40 MG tablet 1/6/2023  No Yes   Sig: Take 1 tablet (40 mg) by mouth daily   tamsulosin (FLOMAX) 0.4 MG capsule 1/6/2023  No Yes   Sig: Take 1 capsule (0.4 mg) by mouth daily    triamcinolone (KENALOG) 0.1 % external cream More than a month  No Yes   Sig: Apply topically 2 times daily   Patient taking differently: Apply topically 2 times daily as needed   triamcinolone (KENALOG) 0.1 % external lotion More than a month  No Yes   Sig: Apply topically 2 times daily   Patient taking differently: Apply topically 2 times daily as needed   warfarin ANTICOAGULANT (COUMADIN) 5 MG tablet 1/5/2023 at PM  No Yes   Sig: Take 5mg every Sun & Wed / Take 7.5mg all other days OR per INR clinic      Facility-Administered Medications: None     Allergies   Allergies   Allergen Reactions     Bees Anaphylaxis       Immunization History   Immunization History   Administered Date(s) Administered     COVID-19 Vaccine 18+ (Moderna) 01/19/2021, 02/16/2021, 11/05/2021     COVID-19 Vaccine Peds Bivalent Booster 6-11Y (Moderna) 11/03/2022     COVID-19,PF,Moderna Booster 08/08/2022     FLU 6-35 months 11/19/2007, 11/20/2008, 09/25/2009, 09/08/2020     Influenza (H1N1) 12/17/2009     Influenza (High Dose) 3 valent vaccine 09/16/2013, 10/13/2014, 10/05/2015, 09/30/2016, 09/07/2017, 10/15/2018, 10/25/2019     Influenza (IIV3) PF 10/13/2011, 10/23/2012     Influenza Vaccine 65+ (Fluzone HD) 09/19/2021     Pneumo Conj 13-V (2010&after) 10/05/2015     Pneumococcal 23 valent 08/11/2011, 08/08/2013, 08/10/2013     TD (ADULT, 7+) 05/07/2003     TDAP Vaccine (Adacel) 09/07/2017     Tdap (Adacel,Boostrix) 06/09/2013     Zoster vaccine recombinant adjuvanted (SHINGRIX) 07/19/2021, 09/27/2021     Zoster vaccine, live 12/23/2008       Social History   I have reviewed this patient's social history and updated it with pertinent information if needed. Fausto Farr  reports that he quit smoking about 20 years ago. His smoking use included cigarettes. He started smoking about 60 years ago. He has a 40.00 pack-year smoking history. He has never used smokeless tobacco. He reports current alcohol use. He reports that he does not use  drugs.    Family History   I have reviewed this patient's family history and updated it with pertinent information if needed.   Family History   Problem Relation Age of Onset     No Known Problems Mother      Coronary Artery Disease Father         CABG     Heart Disease Father         Pacemaker     No Known Problems Brother      No Known Problems Sister      No Known Problems Son      Other Cancer Daughter      No Known Problems Daughter      Heart Disease Brother      Other - See Comments Grandchild        Review of Systems   The 10 point Review of Systems is  As per HPI    Physical Exam   Temp: 98.3  F (36.8  C) Temp src: Temporal BP: 133/64 Pulse: 86   Resp: 17 SpO2: 94 % O2 Device: None (Room air)    Vital Signs with Ranges  Temp:  [98.3  F (36.8  C)-98.7  F (37.1  C)] 98.3  F (36.8  C)  Pulse:  [59-90] 86  Resp:  [17-18] 17  BP: (133-151)/(64-79) 133/64  SpO2:  [94 %-96 %] 94 %  214 lbs 12.8 oz  Body mass index is 34.67 kg/m .    GENERAL APPEARANCE:  awake  EYES: Eyes grossly normal to inspection  NECK: no adenopathy  RESP: lungs clear   CV: regular rates and rhythm  LYMPHATICS: normal ant/post cervical and supraclavicular nodes  ABDOMEN: soft, nontender  MS: R elbow redness, swelling and limited ROM  SKIN: no suspicious lesions or rashes    Data   All laboratory data reviewed  Component      Latest Ref Rng & Units 1/7/2023   WBC      4.0 - 11.0 10e3/uL 11.2 (H)   RBC Count      4.40 - 5.90 10e6/uL 3.81 (L)   Hemoglobin      13.3 - 17.7 g/dL 11.1 (L)   Hematocrit      40.0 - 53.0 % 36.1 (L)   MCV      78 - 100 fL 95   MCH      26.5 - 33.0 pg 29.1   MCHC      31.5 - 36.5 g/dL 30.7 (L)   RDW      10.0 - 15.0 % 13.0   Platelet Count      150 - 450 10e3/uL 238     Component      Latest Ref Rng & Units 1/7/2023   Sodium      136 - 145 mmol/L 138   Potassium      3.4 - 5.3 mmol/L 3.8   Chloride      98 - 107 mmol/L 101   Carbon Dioxide (CO2)      22 - 29 mmol/L 25   Anion Gap      7 - 15 mmol/L 12   Urea Nitrogen       8.0 - 23.0 mg/dL 14.7   Creatinine      0.67 - 1.17 mg/dL 0.74   Calcium      8.8 - 10.2 mg/dL 8.7 (L)   Glucose      70 - 99 mg/dL 110 (H)     Component      Latest Ref Rng & Units 1/6/2023   Sed Rate      0 - 20 mm/hr 30 (H)   CRP Inflammation      <5.00 mg/L 178.71 (H)     Component      Latest Ref Rng & Units 1/6/2023   Color Fluid      Colorless, Yellow Yellow   Appearance Fluid      Clear Cloudy (A)   Total Nucleated Cells      /uL 87,610   Cell Count Fluid Source       Elbow, Right   % Neutrophils Fluid      % 91   % Lymphocytes Fluid      % 2   % Mono/Macro Fluid      % 7   Glucose Fluid Source       Elbow, Right   Glucose Fluid      mg/dL 29   Protein Fluid Source       Elbow, Right   Protein Total Fluid      g/dL 5.2   Crystal Analysis      No clinically significant crystals seen. Positive for intracellular crystals, consistent with calcium pyrophosphate dihydrate crystals. (A)     Microbiology  1/6 r elbow cx pending  1/6 Blood cx pending

## 2023-01-07 NOTE — PROGRESS NOTES
Ortho Brief -     81 yo M with R septic elbow anticoagulated with coumadin INR pending, was 4 2 days ago.    Plan  Vit K 5 mg PO stat  NPO p MN  I&D R elbow tomorrow if INR allow   Check AM INR    Jose A Christine MD

## 2023-01-07 NOTE — PROGRESS NOTES
[facility]    Hospitalist Progress Note  Name: Fausto Farr    MRN: [id number]  Provider:  Parker Kruse DO MPH  Date of Service: 01/07/2023    Summary of Stay: Fausto Farr is a 82 year old male admitted on 1/6/2023 with septic right elbow. 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  He fell in his driveway on 1/2/2023.  He had right shoulder pain as well as some back pain.  He went to the urgency room for evaluation and had unremarkable x-rays.  Yesterday he developed right elbow pain.  He went to [facility] [place] urgent care for evaluation and was referred to the emergency department.     Emergency department evaluation showed stable vital signs.  Laboratory evaluation showed white blood cells 13.8, C-reactive protein 178.71, and INR 1.5.  Right elbow is erythematous and warm.  X-ray of elbow was unremarkable. Arthrocentesis was performed.  The fluid showed 87,610 nucleated cells, 91% of which were neutrophils.  X-ray of elbow was unremarkable.  He was given cefepime and vancomycin.  Orthopedic surgery was consulted by phone and recommended vitamin K and n.p.o. after midnight in preparation for surgery.     Problem list:  Septic right elbow  Chronic anticoagulation with warfarin for mechanical aortic and mitral valve replacements  -Start heparin drip, low intensity without bolus, and run until noon and then stop in anticipation of surgery today at 1700;  that should give 4 or 5 hours of heparin before surgery.  -Resume heparin drip after surgery once okay with orthopedics.  -Cefepime and vancomycin was given in the emergency department.  Now on Ancef and vancomycin.  -Orthopedic surgery consult.  -Infectious disease consult.  -Analgesics and antiemetics as needed.     Coronary artery disease  Previous bypass surgery  Diastolic congestive heart failure without acute exacerbation  Hypertension  TIA  -Resumed prior to admission metoprolol and Crestor.     Mechanical aortic valve replacement  Mechanical mitral valve replacement  Paroxysmal atrial fibrillation  Chronic anticoagulation with warfarin  Subtherapeutic INR 1.5  -Was given vitamin K in the emergency department.  -Heparin drip until noon in anticipation of surgery.  -Resume heparin drip and warfarin once okay with orthopedics; will need these orders.     Previous abdominal aortic aneurysm repair  Peripheral vascular disease  Mesenteric artery insufficiency  -No acute concerns.     History of infected total knee arthroplasty  On chronic antibiotics depression with Augmentin  -Hold Augmentin with current antibiotics.     Ulcerative colitis  -Resume prior to admission mesalamine.     Benign prostatic hypertrophy  -Resume prior to admission Flomax.    DVT Prophylaxis: Heparin   Code Status: Full Code  Diet: NPO per Anesthesia Guidelines for Procedure/Surgery Except for: Meds    Lord Catheter: Not present  Disposition: Expected discharge in 2-3 days to home. Goals prior to discharge include surgery and anticoagulation.   Incidental Findings: None.  Family updated today: No     Interval History   Assumed care from previous hospitalist. The history was fully reviewed.  The patient reports doing well. No chest pain or shortness of breath. No nausea, vomiting, diarrhea, constipation. No fevers. No other specific complaints identified.     -Data reviewed today: I personally reviewed all new labs and imaging results over  the last 24 hours.     Physical Exam   Temp: 98.3  F (36.8  C) Temp src: Temporal BP: 133/64 Pulse: 86   Resp: 17 SpO2: 94 % O2 Device: None (Room air)    Vitals:    01/07/23 0510   Weight: 97.4 kg (214 lb 12.8 oz)     Vital Signs with Ranges  Temp:  [98.2  F (36.8  C)-98.7  F (37.1  C)] 98.3  F (36.8  C)  Pulse:  [59-90] 86  Resp:  [16-18] 17  BP: (118-151)/(64-79) 133/64  SpO2:  [94 %-98 %] 94 %  I/O last 3 completed shifts:  In: -   Out: 400 [Urine:400]    GENERAL: No apparent distress. Awake, alert, and fully oriented.  HEENT: Normocephalic, atraumatic. Extraocular movements intact.  CARDIOVASCULAR: Regular rate and rhythm without murmurs or rubs. No S3.  PULMONARY: Clear bilaterally.  GASTROINTESTINAL: Soft, non-tender, non-distended. Bowel sounds normoactive.   EXTREMITIES: No cyanosis or clubbing. No edema.  NEUROLOGICAL: CN 2-12 grossly intact, no focal neurological deficits.  DERMATOLOGICAL: No rash, ulcer, bruising, nor jaundice.     Medications     lactated ringers 75 mL/hr at 01/07/23 0858     - MEDICATION INSTRUCTIONS -         ceFAZolin  2 g Intravenous Q8H     mesalamine  800 mg Oral BID     metoprolol succinate ER  75 mg Oral Daily     rosuvastatin  40 mg Oral Daily     sodium chloride (PF)  3 mL Intracatheter Q8H     tamsulosin  0.4 mg Oral Daily     vancomycin  1,000 mg Intravenous Q12H     Data     Laboratory:  Recent Labs   Lab 01/07/23  0707 01/06/23  2333 01/06/23  1541   WBC 11.2* 13.0* 13.8*   HGB 11.1* 11.4* 12.6*   HCT 36.1* 35.4* 39.4*   MCV 95 93 93    250 259     Recent Labs   Lab 01/07/23  0707 01/06/23  1541    137   POTASSIUM 3.8 4.0   CHLORIDE 101 99   CO2 25 24   ANIONGAP 12 14   * 115*   BUN 14.7 20.2   CR 0.74 0.95   GFRESTIMATED >90 80   AWILDA 8.7* 9.2     Recent Labs   Lab 01/06/23  2111 01/04/23  0929   INR 1.50* 4.0*     No results for input(s): CULT in the last 168 hours.    Imaging:  Recent Results (from the past 24 hour(s))   XR Elbow Right G/E 3 Views     Narrative    ELBOW RIGHT THREE OR MORE VIEWS    1/6/2023 11:44 AM     HISTORY:  Right elbow pain    COMPARISON: None.      Impression    IMPRESSION: Mild osteoarthrosis of the elbow. There is no evidence of  fracture. There is soft tissue calcification about the medial and  lateral aspects of the elbow which may be from long-standing  tendon/ligament/capsule degeneration. A component of chondrocalcinosis  cannot be ruled out. Extensive arterial calcifications.    JUAN CARLOS MOORE MD         SYSTEM ID:  [id number]         Parker Kruse DO MPH  [facility]  [address] [place] [address]  01/07/2023

## 2023-01-07 NOTE — PHARMACY-ADMISSION MEDICATION HISTORY
Admission medication history interview status for this patient is complete. See [facility] admission navigator for allergy information, prior to admission medications and immunization status.     Medication history interview done, indicate source(s): Patient  Medication history resources (including written lists, pill bottles, clinic record):SureScripts, Care Everywhere  Pharmacy: [facility] [id number] [place] [address]    Changes made to PTA medication list: none    Actions taken by pharmacist (provider contacted, etc):None     Additional medication history information:None    Medication reconciliation/reorder completed by provider prior to medication history?  Y    Prior to Admission medications    Medication Sig Last Dose Taking? Auth Provider Long Term End Date   acetaminophen (TYLENOL) 500 MG tablet Take 500-1,000 mg by mouth every 6 hours as needed for pain  Yes Unknown, Entered By History     amoxicillin-clavulanate (AUGMENTIN) 875-125 MG tablet Take 1 tablet by mouth daily 1/6/2023 Yes Reported, Patient     betamethasone valerate (VALISONE) 0.1 % external lotion Apply topically 2 times daily Apply topically to scalp twice daily PRN Past Month Yes Jodi Flower Mai, MD     cholecalciferol 25 MCG (1000 UT) TABS Take 1,000 Units by mouth daily 1/6/2023 Yes Unknown, Entered By History     ezetimibe (ZETIA) 10 MG tablet Take 1 tablet (10 mg) by mouth daily 1/6/2023 Yes Jodi Flower Mai, MD Yes    ferrous sulfate (FEROSUL) 325 (65 Fe) MG tablet Take 325 mg by mouth daily (with breakfast) 1/6/2023 Yes Unknown, Entered By History     fluocinonide (LIDEX) 0.05 % external ointment Apply topically 2 times daily  Patient taking differently: Apply topically 2 times daily as needed More than a month Yes Jodi Flower Mai, MD     glucosamine-chondroitin 500-400 MG CAPS per capsule Take 1 capsule by mouth every evening 1/6/2023 Yes Unknown, Entered By History     HYDROcodone-acetaminophen (NORCO)  5-325 MG tablet Take 1 tablet by mouth every 6 hours as needed for pain Past Week Yes Rochelle Trejo PA-C No 1/7/23   mesalamine (ASACOL HD) 800 MG EC tablet Take 1 tablet (800 mg) by mouth 2 times daily 1/6/2023 Yes Jodi Flower Mai, MD     metoprolol succinate ER (TOPROL XL) 50 MG 24 hr tablet Take 1 1/2 tab (75mg) daily 1/6/2023 Yes Paige Busch PA-C Yes    rosuvastatin (CRESTOR) 40 MG tablet Take 1 tablet (40 mg) by mouth daily 1/6/2023 Yes Jodi Flower Mai, MD Yes    tamsulosin (FLOMAX) 0.4 MG capsule Take 1 capsule (0.4 mg) by mouth daily 1/6/2023 Yes Jodi Flower Mai, MD     triamcinolone (KENALOG) 0.1 % external cream Apply topically 2 times daily  Patient taking differently: Apply topically 2 times daily as needed More than a month Yes Jodi Flower Mai, MD     triamcinolone (KENALOG) 0.1 % external lotion Apply topically 2 times daily  Patient taking differently: Apply topically 2 times daily as needed More than a month Yes Jodi Flower Mai, MD     warfarin ANTICOAGULANT (COUMADIN) 5 MG tablet Take 5mg every Sun & Wed / Take 7.5mg all other days OR per INR clinic 1/5/2023 at PM Yes Jodi Flower Mai, MD

## 2023-01-07 NOTE — PLAN OF CARE
Pt A&Ox4. SBA with transfers, voiding adequately. NPO. LR infusing at 75ml/hr. Hep drip restarted around 9:00am running at 1200 units, stopped at noon. Pt on IV ancef and vanco. Pt complains of minimal elbow pain with movement. Surgical shower, scrub, done. Plan for surgery at 5:50pm. Will continue to monitor.

## 2023-01-08 ENCOUNTER — APPOINTMENT (OUTPATIENT)
Dept: OCCUPATIONAL THERAPY | Facility: CLINIC | Age: 83
DRG: 511 | End: 2023-01-08
Attending: PHYSICIAN ASSISTANT
Payer: MEDICARE

## 2023-01-08 LAB
GLUCOSE BLDC GLUCOMTR-MCNC: 137 MG/DL (ref 70–99)
GRAM STAIN RESULT: NORMAL
GRAM STAIN RESULT: NORMAL
HGB BLD-MCNC: 11 G/DL (ref 13.3–17.7)
HOLD SPECIMEN: NORMAL
INR PPP: 1.26 (ref 0.85–1.15)
POTASSIUM SERPL-SCNC: 3.9 MMOL/L (ref 3.4–5.3)
VANCOMYCIN SERPL-MCNC: 12.2 UG/ML

## 2023-01-08 PROCEDURE — 36415 COLL VENOUS BLD VENIPUNCTURE: CPT | Performed by: SPECIALIST

## 2023-01-08 PROCEDURE — 99233 SBSQ HOSP IP/OBS HIGH 50: CPT | Performed by: INTERNAL MEDICINE

## 2023-01-08 PROCEDURE — 999N000147 HC STATISTIC PT IP EVAL DEFER

## 2023-01-08 PROCEDURE — 97535 SELF CARE MNGMENT TRAINING: CPT | Mod: GO

## 2023-01-08 PROCEDURE — 80202 ASSAY OF VANCOMYCIN: CPT | Performed by: SPECIALIST

## 2023-01-08 PROCEDURE — 85610 PROTHROMBIN TIME: CPT | Performed by: PHYSICIAN ASSISTANT

## 2023-01-08 PROCEDURE — 250N000013 HC RX MED GY IP 250 OP 250 PS 637: Performed by: PHYSICIAN ASSISTANT

## 2023-01-08 PROCEDURE — 250N000011 HC RX IP 250 OP 636: Performed by: INTERNAL MEDICINE

## 2023-01-08 PROCEDURE — 97110 THERAPEUTIC EXERCISES: CPT | Mod: GO

## 2023-01-08 PROCEDURE — 99233 SBSQ HOSP IP/OBS HIGH 50: CPT | Performed by: SPECIALIST

## 2023-01-08 PROCEDURE — 36415 COLL VENOUS BLD VENIPUNCTURE: CPT | Performed by: PHYSICIAN ASSISTANT

## 2023-01-08 PROCEDURE — 85018 HEMOGLOBIN: CPT | Performed by: PHYSICIAN ASSISTANT

## 2023-01-08 PROCEDURE — 84132 ASSAY OF SERUM POTASSIUM: CPT | Performed by: INTERNAL MEDICINE

## 2023-01-08 PROCEDURE — 250N000011 HC RX IP 250 OP 636: Performed by: PHYSICIAN ASSISTANT

## 2023-01-08 PROCEDURE — 120N000001 HC R&B MED SURG/OB

## 2023-01-08 PROCEDURE — 250N000013 HC RX MED GY IP 250 OP 250 PS 637: Performed by: INTERNAL MEDICINE

## 2023-01-08 PROCEDURE — 97165 OT EVAL LOW COMPLEX 30 MIN: CPT | Mod: GO

## 2023-01-08 RX ORDER — WARFARIN SODIUM 5 MG/1
5 TABLET ORAL
Status: COMPLETED | OUTPATIENT
Start: 2023-01-08 | End: 2023-01-08

## 2023-01-08 RX ORDER — WARFARIN SODIUM 7.5 MG/1
7.5 TABLET ORAL
Status: DISCONTINUED | OUTPATIENT
Start: 2023-01-08 | End: 2023-01-08

## 2023-01-08 RX ORDER — ENOXAPARIN SODIUM 100 MG/ML
100 INJECTION SUBCUTANEOUS EVERY 12 HOURS
Status: COMPLETED | OUTPATIENT
Start: 2023-01-08 | End: 2023-01-08

## 2023-01-08 RX ORDER — METOPROLOL SUCCINATE 50 MG/1
50 TABLET, EXTENDED RELEASE ORAL DAILY
Status: DISCONTINUED | OUTPATIENT
Start: 2023-01-09 | End: 2023-01-10 | Stop reason: HOSPADM

## 2023-01-08 RX ORDER — WARFARIN SODIUM 7.5 MG/1
7.5 TABLET ORAL
Status: COMPLETED | OUTPATIENT
Start: 2023-01-08 | End: 2023-01-08

## 2023-01-08 RX ADMIN — VANCOMYCIN HYDROCHLORIDE 1000 MG: 1 INJECTION, SOLUTION INTRAVENOUS at 21:05

## 2023-01-08 RX ADMIN — TAMSULOSIN HYDROCHLORIDE 0.4 MG: 0.4 CAPSULE ORAL at 09:26

## 2023-01-08 RX ADMIN — CEFAZOLIN SODIUM 2 G: 2 INJECTION, SOLUTION INTRAVENOUS at 05:12

## 2023-01-08 RX ADMIN — ENOXAPARIN SODIUM 100 MG: 100 INJECTION SUBCUTANEOUS at 17:19

## 2023-01-08 RX ADMIN — WARFARIN SODIUM 7.5 MG: 7.5 TABLET ORAL at 17:19

## 2023-01-08 RX ADMIN — MESALAMINE 800 MG: 800 TABLET, DELAYED RELEASE ORAL at 21:04

## 2023-01-08 RX ADMIN — WARFARIN SODIUM 5 MG: 5 TABLET ORAL at 17:19

## 2023-01-08 RX ADMIN — VANCOMYCIN HYDROCHLORIDE 1000 MG: 1 INJECTION, SOLUTION INTRAVENOUS at 09:28

## 2023-01-08 RX ADMIN — METOPROLOL SUCCINATE 75 MG: 50 TABLET, EXTENDED RELEASE ORAL at 09:26

## 2023-01-08 RX ADMIN — MESALAMINE 800 MG: 800 TABLET, DELAYED RELEASE ORAL at 09:27

## 2023-01-08 RX ADMIN — OXYCODONE HYDROCHLORIDE 5 MG: 5 TABLET ORAL at 21:04

## 2023-01-08 RX ADMIN — OXYCODONE HYDROCHLORIDE 5 MG: 5 TABLET ORAL at 05:19

## 2023-01-08 RX ADMIN — ROSUVASTATIN CALCIUM 40 MG: 20 TABLET, FILM COATED ORAL at 09:27

## 2023-01-08 RX ADMIN — OXYCODONE HYDROCHLORIDE 2.5 MG: 5 TABLET ORAL at 01:23

## 2023-01-08 RX ADMIN — OXYCODONE HYDROCHLORIDE 2.5 MG: 5 TABLET ORAL at 13:05

## 2023-01-08 ASSESSMENT — ACTIVITIES OF DAILY LIVING (ADL)
ADLS_ACUITY_SCORE: 30

## 2023-01-08 NOTE — PROGRESS NOTES
"   01/08/23 0800   Appointment Info   Signing Clinician's Name / Credentials (OT) Dariana Herndon, OTR/L   Living Environment   People in Home spouse   Current Living Arrangements house   Home Accessibility stairs within home;stairs to enter home   Number of Stairs, Main Entrance other (see comments)  (split level home 1 step in 6 up to main floor 10 to bedroom)   Transportation Anticipated family or friend will provide   Living Environment Comments Bathroom in basement, bedroom on second floor. Has walk in shower with no chair. Standard height toilet   Self-Care   Equipment Currently Used at Home none   Fall history within last six months yes   Number of times patient has fallen within last six months 1   Activity/Exercise/Self-Care Comment reports he was ind with all ADLs prior to fall   Instrumental Activities of Daily Living (IADL)   IADL Comments shares responsibilities with wife. Wife and patient are both retired and home together. Was active prior to fall   General Information   Onset of Illness/Injury or Date of Surgery 01/06/23   Referring Physician Efren Schaefer PA-C   Patient/Family Therapy Goal Statement (OT) \"get home to my wife\"   Additional Occupational Profile Info/Pertinent History of Current Problem Fausto Farr is a 82 year old male admitted on 1/6/2023 with septic right elbow. 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  He fell in his driveway on " 1/2/2023.  He had right shoulder pain as well as some back pain.  He went to the urgency room for evaluation and had unremarkable x-rays.  Yesterday he developed right elbow pain.  He   Existing Precautions/Restrictions other (see comments)  (ok'd for elbow/wrist gentle ROM, splint to be removed 1-2 times a day for exercises. Clarified with ortho PA-C on 1/8. Ortho plans to remove splint on 1/9, following drain removal.)   Right Upper Extremity (Weight-bearing Status) non weight-bearing (NWB)   General Observations and Info Increased swelling in right hand, stiffness noted in fingers   Cognitive Status Examination   Orientation Status orientation to person, place and time   Cognitive Status Comments no cognitive concerns noted in session, A/O   Visual Perception   Visual Impairment/Limitations WNL   Pain Assessment   Patient Currently in Pain Yes, see Vital Sign flowsheet   Range of Motion Comprehensive   General Range of Motion upper extremity range of motion deficits identified   General Upper Extremity Assessment (Range of Motion)   Comment: Upper Extremity ROM right shoulder flexion <90 degrees. Unable to test elbow with splint on, ok to remove on 1/9 following removal of drain per ortho PA-C.   Bed Mobility   Bed Mobility supine-sit   Comment (Bed Mobility) WB status increases challenge   Activities of Daily Living   BADL Assessment/Intervention upper body dressing;toileting   Upper Body Dressing Assessment/Training   Comment, (Upper Body Dressing) sequencing/strategies for inc   [place] Level (Upper Body Dressing) moderate assist (50% patient effort)   Toileting   [place] Level (Toileting) minimum assist (75% patient effort)   Clinical Impression   Criteria for Skilled Therapeutic Interventions Met (OT) Yes, treatment indicated   OT Diagnosis decreased ADLs   Influenced by the following impairments decreased elbow ROM, pain, stiffness, WB restrictions   OT Problem List-Impairments impacting ADL  problems related to;activity tolerance impaired;range of motion (ROM);pain;post-surgical precautions   ADL comments/analysis UB dressing, showering, stairs, ROM exercises   Assessment of Occupational Performance 3-5 Performance Deficits   Planned Therapy Interventions (OT) ADL retraining;home program guidelines;ROM   Clinical Decision Making Complexity (OT) low complexity   Anticipated Equipment Needs Upon Discharge (OT)   (shower chair)   Risk & Benefits of therapy have been explained evaluation/treatment results reviewed;care plan/treatment goals reviewed;patient;participants included   OT Goals   Therapy Frequency (OT) Daily   OT Predicted Duration/Target Date for Goal Attainment 01/11/23   OT Goals Upper Body Dressing;OT Goal 1;OT Goal 2;Toilet Transfer/Toileting   OT: Upper Body Dressing Modified independent   OT: Toilet Transfer/Toileting Modified independent   OT: Goal 1 Pt will demo'd ability to complete HEP for elbow/wrist/hand ROM to prevent stiffness and increase (I) with ADLs   OT: Goal 2 Pt will demo'd safety with stairs in order to discharge home safely   OT Discharge Planning   OT Plan elbow/wrist ROM (we are to remove splint to complete ROM),  stairs, UB dressing   OT Discharge Recommendation (DC Rec) home with assist   OT Rationale for DC Rec Pt is performing slightly below baseline with new WB restrictions and decreased ROM. Anticipate with continued IP OT, patient will demo'd safety to discharge home with assistance from wife. Pending elbow ROM/stiffness may benefit from OP OT/PT to assist with motion/strengthening to prevent stiffness/contractures.   OT Brief overview of current status Pt is complete UB dressing with mod A functional mobility with SBA. Do NOT use walker with patient 2/2 WB restrictions

## 2023-01-08 NOTE — PLAN OF CARE
Goal Outcome Evaluation:         Patient vital signs are at baseline: Yes  Patient able to ambulate as they were prior to admission or with assist devices provided by therapies during their stay:  Yes, pt is A-1 with gate belt and walker.  Patient MUST void prior to discharge:  Yes  Patient able to tolerate oral intake:  Yes  Pain has adequate pain control using Oral analgesics:  Yes, takes Oxycodone and tylenol.  Does patient have an identified :  Yes, wife.  Has goal D/C date and time been discussed with patient:  Yes.pt will discharge home  , expected discharge date 1/9/23.      Pt is A&Ox3, slept all night ambulated to bathroom and voided.Dressing intact. Numbness present RUE. Pt is on CAPNO monitoring, on 2L Oxygen. IV to LUE. Continues IV   Ancef and Vanco. Pain controled with Oxycodone PRN and Tylenol.

## 2023-01-08 NOTE — PROGRESS NOTES
"ORTHOPEDIC UPPER EXTREMITY PROGRESS NOTE    POD# 1  Patient is a 82 year old male who underwent Procedure(s):  IRRIGATION AND DEBRIDEMENT, RIGHT ELBOW on 1/6/2023 - 1/7/2023 on right. Pain is well controlled. Asking about restrictions of motion and plan for next few days discussed.    Vitals:  /59 (BP Location: Left arm)   Pulse 67   Temp 97  F (36.1  C) (Temporal)   Resp 17   Ht 1.676 m (5' 6\")   Wt 100.5 kg (221 lb 9.6 oz)   SpO2 93%   BMI 35.77 kg/m      EXAM   The patient is awake and alert and working with therapy.   Sensation is intact.  Digital Flexion/Extension maintained. Digits with moderate edema  Brisk cap refill.   The incision is covered with soft dressing and resting splint.     Labs: Recent Labs   Lab Test 01/08/23  0726 01/07/23  0707 01/06/23  2333 01/06/23  2111 01/06/23  1541 01/04/23  0929   HGB 11.0* 11.1* 11.4*  --    < >  --    INR 1.26*  --   --  1.50*  --  4.0*    < > = values in this interval not displayed.     INR   Date Value Ref Range Status   01/08/2023 1.26 (H) 0.85 - 1.15 Final   07/08/2021 2.00 (H) 0.86 - 1.14 Final     Comment:     This test is intended for monitoring Coumadin therapy.  Results are not   accurate in patients with prolonged INR due to factor deficiency.          CX: pending  ASSESSMENT  s/p I&D right elbow  PLAN  1.  Infectious disease consult-appreciate assistance.  2.  Wear the splint for approximately 10 days to quiet the soft tissues and elbow.  Okay for this to be removed for showers and gentle range of motion.  3.  RON drain to be dcd tomorrow  4. Coumadin as PTA  5. Follow up in my clinic in approximately 2 full weeks for wound check, sutures out.    Kjerstin Foss PA-C [facility]    "

## 2023-01-08 NOTE — CONSULTS
Care Management Note    Discharge Date: 01/09/2023       Discharge Disposition:  Home with spouse    Discharge Services: home infusion antibiotics     Discharge Transportation: family or friend will provide        Additional Information:  CM following for discharge planning and home IV antibiotic needs. Per chart review, ID is recommending long term IV atnibiotic therapy for 4 weeks. Patient is currently on IV Vanco 1000 mg q 12 hrs. Patient does not yet have long term IV access. [facility] for benefit [facility] or cost estimate. Will await HI response.     Physical Therapy/OT have assessed patient for discharge planning. They are recommending home with assist and no additional home care services. Patient lives at home with his wife. Anticipate discharge home with wife with home infusion once final antibiotic plan is determined.     CM will cont to follow.                 Lina Devi RN BSN CM  Inpatient Care Coordination  [facility]  [phone]

## 2023-01-08 NOTE — PLAN OF CARE
Goal Outcome Evaluation:      Plan of Care Reviewed With: patient    Overall Patient Progress: improvingOverall Patient Progress: improving         Cared for pt. [id number].  VSS. Afebrile. AO x4. Oxygen 2 LPM via NC. IVF NS running at 75. Vanco currently running. Pt. Also on ancef. RON drain emptied for 10 mL. Pacemaker. Pt. Voiding. CMS intact. Pt. Given oxycodone for pain management, but nothing for pain management post op. Pt. On warfarin. Ace wrap in place and CDI. Potassium protocol. Discharge plan is home 1/9.

## 2023-01-08 NOTE — ANESTHESIA POSTPROCEDURE EVALUATION
Patient: Fausto Carson Yordan    Procedure: Procedure(s):  IRRIGATION AND DEBRIDEMENT, RIGHT ELBOW       Anesthesia Type:  General    Note:  Disposition: Inpatient   Postop Pain Control: Uneventful            Sign Out: Well controlled pain   PONV: No   Neuro/Psych: Uneventful            Sign Out: Acceptable/Baseline neuro status   Airway/Respiratory: Uneventful            Sign Out: Acceptable/Baseline resp. status   CV/Hemodynamics: Uneventful            Sign Out: Acceptable CV status; No obvious hypovolemia; No obvious fluid overload   Other NRE: NONE   DID A NON-ROUTINE EVENT OCCUR? No           Last vitals:  Vitals Value Taken Time   /84 01/07/23 1850   Temp 98.3  F (36.8  C) 01/07/23 1850   Pulse 128 01/07/23 1854   Resp 73 01/07/23 1854   SpO2 100 % 01/07/23 1854   Vitals shown include unvalidated device data.    Electronically Signed By: Mina Gamez MD  January 7, 2023  6:55 PM

## 2023-01-08 NOTE — ANESTHESIA CARE TRANSFER NOTE
Patient: Fausto Alli Yordan    Procedure: Procedure(s):  IRRIGATION AND DEBRIDEMENT, RIGHT ELBOW       Diagnosis: Infection [B99.9]  Diagnosis Additional Information: No value filed.    Anesthesia Type:   General     Note:    Oropharynx: oropharynx clear of all foreign objects  Level of Consciousness: awake  Oxygen Supplementation: face mask    Independent Airway: airway patency satisfactory and stable  Dentition: dentition unchanged  Vital Signs Stable: post-procedure vital signs reviewed and stable  Report to RN Given: handoff report given  Patient transferred to: PACU    Handoff Report: Identifed the Patient, Identified the Reponsible Provider, Reviewed the pertinent medical history, Discussed the surgical course, Reviewed Intra-OP anesthesia mangement and issues during anesthesia, Set expectations for post-procedure period and Allowed opportunity for questions and acknowledgement of understanding      Vitals:  Vitals Value Taken Time   /84 01/07/23 1850   Temp     Pulse 92 01/07/23 1852   Resp 28 01/07/23 1852   SpO2 100 % 01/07/23 1852   Vitals shown include unvalidated device data.    Electronically Signed By: ADELE Myers CRNA  January 7, 2023  6:53 PM

## 2023-01-08 NOTE — PROGRESS NOTES
[facility]    Medicine Progress Note - Hospitalist Service    Date of Admission:  1/6/2023    Assessment & Plan   Summary of Stay: Fausto Farr is a 82 year old male admitted on 1/6/2023 with septic right elbow. 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  He fell in his driveway on 1/2/2023.  He had right shoulder pain as well as some back pain.  He went to the urgency room for evaluation and had unremarkable x-rays.  Yesterday he developed right elbow pain.  He went to [facility] urgent [facility] for evaluation and was referred to the emergency department.     Emergency department evaluation showed stable vital signs.  Laboratory evaluation showed white blood cells 13.8, C-reactive protein 178.71, and INR 1.5.  Right elbow is erythematous and warm.  X-ray of elbow was unremarkable. Arthrocentesis was performed.  The fluid showed 87,610 nucleated cells, 91% of which were neutrophils.  X-ray of elbow was unremarkable.  He was given cefepime and vancomycin.  Orthopedic surgery was consulted by phone and recommended vitamin K and n.p.o. after midnight in preparation for surgery.     Problem list:  Septic right elbow  Culture negative   MRSA nare postivie in the past   Knees treated for non MRSA prophy   Now on vanc only   Will order picc line for 4 weeks of IV abx,, presumable vanco   Pseudo gout noted   Appreciate ortho and ID.   Stopped iv fluids, and iv  narc.     Chronic anticoagulation with warfarin for mechanical aortic and mitral valve replacements  -Start heparin drip, low intensity without bolus, and run until noon and then stop in anticipation of surgery today at 1700; that should give 4 or 5 hours of heparin before surgery.  -Resume heparin drip after surgery once okay with orthopedics.  -Cefepime and vancomycin was given in the emergency department.  Now on Ancef and vancomycin.  -Orthopedic surgery consult.  -Infectious disease consult.  -Analgesics and antiemetics as needed.     Coronary artery disease  Previous bypass surgery  Diastolic congestive heart failure without acute exacerbation  Hypertension  TIA  -Resumed prior to admission metoprolol and Crestor.    Bradycardia  -- reduce metoprolol      Mechanical aortic valve replacement  Mechanical mitral valve replacement  Paroxysmal atrial fibrillation  Chronic anticoagulation with warfarin  Subtherapeutic INR 1.5  -Was given vitamin K in the emergency department.  -Heparin drip until noon in anticipation of surgery.   lovenox 100mg subcutaneous x1  12.5mg warfarin tonight      Previous abdominal aortic aneurysm repair  Peripheral vascular disease  Mesenteric artery insufficiency  -No acute concerns.     History of infected total knee arthroplasty  On chronic antibiotics depression with Augmentin  -Hold Augmentin with current antibiotics.     Ulcerative colitis  -Resume prior to admission mesalamine.     Benign prostatic hypertrophy  -Resume prior to admission Flomax.     DVT Prophylaxis: Heparin   Code Status: Full Code  Diet: NPO per Anesthesia Guidelines for Procedure/Surgery Except for: Meds    Lord Catheter: Not present  Disposition: Expected discharge in 2-3 days to home. Goals prior to discharge include surgery and anticoagulation.   Incidental Findings: None.  Family updated today: No             Diet: Advance Diet as Tolerated: Regular Diet Adult    DVT Prophylaxis:   Lord Catheter: Not  "present  Lines: None     Cardiac Monitoring: None  Code Status: Full Code      Clinically Significant Risk Factors                        # Obesity: Estimated body mass index is 35.77 kg/m  as calculated from the following:    Height as of this encounter: 1.676 m (5' 6\").    Weight as of this encounter: 100.5 kg (221 lb 9.6 oz)., PRESENT ON ADMISSION         Disposition Plan      Expected Discharge Date: 01/09/2023        Discharge Comments: Home Monday.  Possible Home Abx.          Oscar Haq MD  Hospitalist Service  [facility]  Securely message with [employer] (more info)  Text page via [employer] Paging/Directory   ______________________________________________________________________    Interval History     Has had cleanout   ID and ortho has seen   cx form wound negative   Large effusion drained       Physical Exam   Vital Signs: Temp: 97.2  F (36.2  C) Temp src: Temporal BP: (!) 142/58 Pulse: (!) 48   Resp: 20 SpO2: 98 % O2 Device: None (Room air) Oxygen Delivery: 2 LPM  Weight: 221 lbs 9.6 oz    General Appearance: *well developed  Respiratory: cta,slightly reduced   Cardiovascular:RRR,murmus present   GI: unremarkable   Skin: right arm wrapped to elbo   Other: aaox3     Medical Decision Making       **CLEAR ALL SELECTIONS**      Data   IN 48 HOURS  "

## 2023-01-08 NOTE — PROGRESS NOTES
[facility]    Infectious Disease Progress Note    Date of Service : 01/08/2023     Assessment:  82 year old male with atrial fibrillation, Coronary artery disease, CABG , mechanical mitral and aortic valves, abdominal AAA repair, pacemaker, ulcerative colitis and prior polymicrobial right TKA infection treated in 2020 (has been on Augmentin for oral suppression for TKA infection) who has been hospitalized with right elbow septic arthritis following a fall. He is s/p surgical debridement on 1/7, gross purulence was noted in the elbow joint and cxs are pending. Usual etiologies staph and strep. Cxs may remain suppressed due to use of oral suppressive antibiotics.        Recommendations:  1. Maintain on Vancomycin until cx data are available (cxs may remain suppressed since he was on oral suppressive antibiotics)   2. Will need long term IV access once antibiotic plan is finalized.  3. Follow renal functions closely, and vancomycin level. Pharmacy to help with vancomycin dosing.    Will need 4 week treatment course.       Mely Rai MD    Interval History   Underwent surgical debridement yesterday, gross purulence was noted in the elbow joint. Cxs are pending. Feels better    Physical Exam   Temp: 97.4  F (36.3  C) Temp src: Temporal BP: (!) 148/62 Pulse: 70   Resp: 17 SpO2: 92 % O2 Device: Nasal cannula Oxygen Delivery: 2 LPM  Vitals:    01/07/23 0510   Weight: 97.4 kg (214 lb 12.8 oz)     Vital Signs with Ranges  Temp:  [97.4  F (36.3  C)-98.9  F (37.2  C)] 97.4  F (36.3  C)  Pulse:  [] 70  Resp:  [11-73] 17  BP: (133-157)/(57-84) 148/62  SpO2:  [92 %-100 %] 92 %    Constitutional: Awake, alert, cooperative, no apparent distress  Lungs: Clear to auscultation bilaterally, no crackles or wheezing  Cardiovascular: Regular rate and rhythm, normal S1 and S2, and no murmur noted  Abdomen: Normal bowel sounds, soft, non-distended, non-tender  Skin: No rash  MS :  R elbow in dressing, drain in  place    Other:    Medications     - MEDICATION INSTRUCTIONS -       sodium chloride 75 mL/hr at 01/07/23 2243       mesalamine  800 mg Oral BID     metoprolol succinate ER  75 mg Oral Daily     rosuvastatin  40 mg Oral Daily     sodium chloride (PF)  3 mL Intracatheter Q8H     sodium chloride (PF)  3 mL Intracatheter Q8H     tamsulosin  0.4 mg Oral Daily     vancomycin  1,000 mg Intravenous Q12H     Warfarin Therapy Reminder  1 each Oral See Admin Instructions       Data   All microbiology laboratory data reviewed.  Recent Labs   Lab Test 01/08/23  0726 01/07/23  0707 01/06/23  2333 01/06/23  1541   WBC  --  11.2* 13.0* 13.8*   HGB 11.0* 11.1* 11.4* 12.6*   HCT  --  36.1* 35.4* 39.4*   MCV  --  95 93 93   PLT  --  238 250 259     Recent Labs   Lab Test 01/07/23  0707 01/06/23  1541 10/12/22  0836   CR 0.74 0.95 1.0     Recent Labs   Lab Test 01/06/23  1541   SED 30*      Microbiology  1/7 operative cxs pending , stain negative  1/6 Blood cx negative

## 2023-01-08 NOTE — OP NOTE
Procedure Date: 01/07/2023    PREOPERATIVE DIAGNOSIS:  Right septic elbow.    POSTOPERATIVE DIAGNOSES:  Right septic elbow.    PROCEDURE PERFORMED:  Right elbow arthrotomy, irrigation and debridement.    SURGEON:  Jose A Christine MD    ASSISTANTS:  Efren Schaefer PA-C, whose assistance was critical for positioning the patient, retraction during exposure, debridement, closure and splint application.    ANESTHESIA:  General.    ESTIMATED BLOOD LOSS:  10 mL.    FINDINGS:  Gross purulence within the elbow joint.    INDICATIONS FOR PROCEDURE:  An 82-year-old male who presented with severe right elbow pain, swelling, increased inflammatory markers and a tap consistent with septic elbow.  Risks, benefits, alternatives explained, he elected to proceed with the above procedure.    DESCRIPTION OF PROCEDURE:  The patient was identified in preoperative holding area per hospital policy, correct operative site marked, to the OR and to the OR table.  Anesthesia induced.  Chlorhexidine prescrub, ChloraPrep, draped.  A timeout performed, all in the room agreed.    With the arm pronated, I performed a Kocher approach to the lateral aspect of the elbow, dissected through skin and subcutaneous tissue, identifying the fascia, palpated the radial head, articulation with the capitellum by supinating and pronating the forearm.  Incised capsule , taking care to avoid the PIN nerve. Encountered purulence.  Culture x2.  Visualized the elbow joint.  It was copiously irrigated with 3 liters normal saline.  I placed a 10-Lao drain deep  through a percutaneous stab incision.  Closed in layers with PDS and nylon for skin.  Sterile dressings applied. Orthoglass splint fashioned and applied.  Awakened and transferred stable to PACU.    POSTOPERATIVE PLAN:    1.  Infectious disease consult.  2.  PICC line.  3.  Likely will require a course of IV antibiotics followed by oral antibiotics.  4.  Wear the splint for approximately 10 days to quiet  the soft tissues and elbow.  Okay for this to be removed for showers.  5.  Follow up in my clinic in approximately 2 full weeks for wound check, sutures out.    Jose A Christine MD        D: 2023   T: 2023   MT: LINA    Name:     LIANG VAUGHN  MRN:      [id number]        Account:        [id number]   :      1940           Procedure Date: 2023     Document: [id number]

## 2023-01-08 NOTE — PLAN OF CARE
PT: Received orders for physical therapy evaluation and treatment.  Patient s/p I&D of right elbow following mechanical fall in driveway.  Patient was seen by occupational therapy for evaluation.  Occupational therapy reported no concerns with ambulation, plans to trial stair negotiation with patient tomorrow.  Patient does not use an assistive device at baseline and does not have a history of falls beyond mechanical fall on icy driveway that caused current injury.  Patient with no inpatient physical therapy needs at this time, will complete orders.  If mobility needs change, please re-consult.

## 2023-01-08 NOTE — PROGRESS NOTES
HR 48 this afternoon, provider decreased metoprolol. Pain managed with oxycodone. Dressing CDI, CMS intact. On IV vanco. Starting on lovenox in addition to coumadin. Tolerating regular diet. Voiding. +BM. Will need plan for long term antibiotics.

## 2023-01-09 ENCOUNTER — HOME INFUSION (PRE-WILLOW HOME INFUSION) (OUTPATIENT)
Dept: PHARMACY | Facility: CLINIC | Age: 83
End: 2023-01-09

## 2023-01-09 ENCOUNTER — APPOINTMENT (OUTPATIENT)
Dept: OCCUPATIONAL THERAPY | Facility: CLINIC | Age: 83
DRG: 511 | End: 2023-01-09
Payer: MEDICARE

## 2023-01-09 LAB
ANION GAP SERPL CALCULATED.3IONS-SCNC: 9 MMOL/L (ref 7–15)
BUN SERPL-MCNC: 16 MG/DL (ref 8–23)
CALCIUM SERPL-MCNC: 8.5 MG/DL (ref 8.8–10.2)
CHLORIDE SERPL-SCNC: 102 MMOL/L (ref 98–107)
CREAT SERPL-MCNC: 0.69 MG/DL (ref 0.67–1.17)
CRP SERPL-MCNC: 70.84 MG/L
DEPRECATED HCO3 PLAS-SCNC: 27 MMOL/L (ref 22–29)
ERYTHROCYTE [DISTWIDTH] IN BLOOD BY AUTOMATED COUNT: 12.4 % (ref 10–15)
GFR SERPL CREATININE-BSD FRML MDRD: >90 ML/MIN/1.73M2
GLUCOSE SERPL-MCNC: 121 MG/DL (ref 70–99)
HCT VFR BLD AUTO: 36.8 % (ref 40–53)
HGB BLD-MCNC: 11.5 G/DL (ref 13.3–17.7)
INR PPP: 1.37 (ref 0.85–1.15)
MCH RBC QN AUTO: 29 PG (ref 26.5–33)
MCHC RBC AUTO-ENTMCNC: 31.3 G/DL (ref 31.5–36.5)
MCV RBC AUTO: 93 FL (ref 78–100)
PLATELET # BLD AUTO: 311 10E3/UL (ref 150–450)
POTASSIUM SERPL-SCNC: 4 MMOL/L (ref 3.4–5.3)
RBC # BLD AUTO: 3.96 10E6/UL (ref 4.4–5.9)
SODIUM SERPL-SCNC: 138 MMOL/L (ref 136–145)
WBC # BLD AUTO: 8.6 10E3/UL (ref 4–11)

## 2023-01-09 PROCEDURE — 86140 C-REACTIVE PROTEIN: CPT | Performed by: INTERNAL MEDICINE

## 2023-01-09 PROCEDURE — 36415 COLL VENOUS BLD VENIPUNCTURE: CPT | Performed by: PHYSICIAN ASSISTANT

## 2023-01-09 PROCEDURE — 250N000013 HC RX MED GY IP 250 OP 250 PS 637: Performed by: PHYSICIAN ASSISTANT

## 2023-01-09 PROCEDURE — 250N000013 HC RX MED GY IP 250 OP 250 PS 637: Performed by: INTERNAL MEDICINE

## 2023-01-09 PROCEDURE — 85610 PROTHROMBIN TIME: CPT | Performed by: PHYSICIAN ASSISTANT

## 2023-01-09 PROCEDURE — 80048 BASIC METABOLIC PNL TOTAL CA: CPT | Performed by: INTERNAL MEDICINE

## 2023-01-09 PROCEDURE — 250N000011 HC RX IP 250 OP 636: Performed by: INTERNAL MEDICINE

## 2023-01-09 PROCEDURE — 99233 SBSQ HOSP IP/OBS HIGH 50: CPT | Performed by: INTERNAL MEDICINE

## 2023-01-09 PROCEDURE — 97535 SELF CARE MNGMENT TRAINING: CPT | Mod: GO

## 2023-01-09 PROCEDURE — 120N000001 HC R&B MED SURG/OB

## 2023-01-09 PROCEDURE — 258N000003 HC RX IP 258 OP 636: Performed by: PHYSICIAN ASSISTANT

## 2023-01-09 PROCEDURE — 250N000011 HC RX IP 250 OP 636: Performed by: PHYSICIAN ASSISTANT

## 2023-01-09 PROCEDURE — 85027 COMPLETE CBC AUTOMATED: CPT | Performed by: PHYSICIAN ASSISTANT

## 2023-01-09 RX ORDER — WARFARIN SODIUM 5 MG/1
10 TABLET ORAL
Status: DISCONTINUED | OUTPATIENT
Start: 2023-01-09 | End: 2023-01-09

## 2023-01-09 RX ORDER — OXYCODONE HYDROCHLORIDE 5 MG/1
5 TABLET ORAL EVERY 4 HOURS PRN
Qty: 15 TABLET | Refills: 0 | Status: SHIPPED | OUTPATIENT
Start: 2023-01-09 | End: 2023-10-24

## 2023-01-09 RX ORDER — AMOXICILLIN 250 MG
1 CAPSULE ORAL 2 TIMES DAILY PRN
Qty: 14 TABLET | Refills: 0 | Status: SHIPPED | OUTPATIENT
Start: 2023-01-09 | End: 2023-06-07

## 2023-01-09 RX ORDER — POLYETHYLENE GLYCOL 3350 17 G/17G
17 POWDER, FOR SOLUTION ORAL DAILY
Qty: 510 G | Refills: 0 | Status: SHIPPED | OUTPATIENT
Start: 2023-01-09 | End: 2023-06-07

## 2023-01-09 RX ORDER — ENOXAPARIN SODIUM 100 MG/ML
100 INJECTION SUBCUTANEOUS EVERY 12 HOURS
Status: DISCONTINUED | OUTPATIENT
Start: 2023-01-09 | End: 2023-01-10 | Stop reason: HOSPADM

## 2023-01-09 RX ORDER — HYDRALAZINE HYDROCHLORIDE 20 MG/ML
10 INJECTION INTRAMUSCULAR; INTRAVENOUS EVERY 6 HOURS PRN
Status: DISCONTINUED | OUTPATIENT
Start: 2023-01-09 | End: 2023-01-10 | Stop reason: HOSPADM

## 2023-01-09 RX ORDER — ONDANSETRON 4 MG/1
4 TABLET, ORALLY DISINTEGRATING ORAL EVERY 6 HOURS PRN
Qty: 5 TABLET | Refills: 0 | Status: SHIPPED | OUTPATIENT
Start: 2023-01-09 | End: 2023-06-07

## 2023-01-09 RX ORDER — LIDOCAINE 40 MG/G
CREAM TOPICAL
Status: DISCONTINUED | OUTPATIENT
Start: 2023-01-09 | End: 2023-01-09

## 2023-01-09 RX ORDER — ACETAMINOPHEN 325 MG/1
650 TABLET ORAL EVERY 6 HOURS PRN
Qty: 100 TABLET | Refills: 0 | Status: SHIPPED | OUTPATIENT
Start: 2023-01-09

## 2023-01-09 RX ADMIN — OXYCODONE HYDROCHLORIDE 5 MG: 5 TABLET ORAL at 20:27

## 2023-01-09 RX ADMIN — ROSUVASTATIN CALCIUM 40 MG: 20 TABLET, FILM COATED ORAL at 08:19

## 2023-01-09 RX ADMIN — OXYCODONE HYDROCHLORIDE 5 MG: 5 TABLET ORAL at 12:47

## 2023-01-09 RX ADMIN — WARFARIN SODIUM 12.5 MG: 10 TABLET ORAL at 18:10

## 2023-01-09 RX ADMIN — AMOXICILLIN AND CLAVULANATE POTASSIUM 1 TABLET: 875; 125 TABLET, FILM COATED ORAL at 20:27

## 2023-01-09 RX ADMIN — MESALAMINE 800 MG: 800 TABLET, DELAYED RELEASE ORAL at 08:19

## 2023-01-09 RX ADMIN — MESALAMINE 800 MG: 800 TABLET, DELAYED RELEASE ORAL at 20:27

## 2023-01-09 RX ADMIN — AMOXICILLIN AND CLAVULANATE POTASSIUM 1 TABLET: 875; 125 TABLET, FILM COATED ORAL at 12:14

## 2023-01-09 RX ADMIN — ENOXAPARIN SODIUM 100 MG: 100 INJECTION SUBCUTANEOUS at 16:32

## 2023-01-09 RX ADMIN — TAMSULOSIN HYDROCHLORIDE 0.4 MG: 0.4 CAPSULE ORAL at 08:19

## 2023-01-09 RX ADMIN — METOPROLOL SUCCINATE 50 MG: 50 TABLET, EXTENDED RELEASE ORAL at 08:19

## 2023-01-09 RX ADMIN — VANCOMYCIN HYDROCHLORIDE 1250 MG: 5 INJECTION, POWDER, LYOPHILIZED, FOR SOLUTION INTRAVENOUS at 08:34

## 2023-01-09 RX ADMIN — OXYCODONE HYDROCHLORIDE 2.5 MG: 5 TABLET ORAL at 08:34

## 2023-01-09 ASSESSMENT — ACTIVITIES OF DAILY LIVING (ADL)
ADLS_ACUITY_SCORE: 30
ADLS_ACUITY_SCORE: 31
ADLS_ACUITY_SCORE: 30
ADLS_ACUITY_SCORE: 31
ADLS_ACUITY_SCORE: 30
ADLS_ACUITY_SCORE: 30
ADLS_ACUITY_SCORE: 31
ADLS_ACUITY_SCORE: 30
ADLS_ACUITY_SCORE: 31

## 2023-01-09 NOTE — PLAN OF CARE
"Care from [id number]    Inpatient Progress Note:  For complete assessment see flow sheet documentation.    BP (!) 142/58 (BP Location: Left arm)   Pulse (!) 48   Temp 97.2  F (36.2  C) (Temporal)   Resp 20   Ht 1.676 m (5' 6\")   Wt 100.5 kg (221 lb 9.6 oz)   SpO2 98%   BMI 35.77 kg/m         Orientation: A&Ox4  Neuro: WNL  Pain status: Moderate pain on RUE  Activity: SBA/Independent  Resp: WNL, RA  Cardiac: WNL  GI: WNL  : WNL  Skin: Acewrap and soft splint on RUE  LDA: PIV  Infusions: Intermittent IV abx, will need long term IV abx treatment  Diet: Regular  Discharge Plan: Possible discharge to home with home infusion. Date TBD    VSS on RA. Acewrap and soft splint on R arm. CMS intact. Ambulate independently in room. PRN 5mg oxycodone given.    Will continue to monitor and provide cares.     Lluvia Griffiths RN    "

## 2023-01-09 NOTE — PROGRESS NOTES
[facility]    Infectious Disease Progress Note    Date of Service : 01/09/2023     Assessment:  82 year old male with atrial fibrillation, Coronary artery disease, CABG , mechanical mitral and aortic valves, abdominal AAA repair, pacemaker, ulcerative colitis and prior polymicrobial right TKA infection treated in 2020 (has been on Augmentin for oral suppression for TKA infection) who has been hospitalized with right elbow septic arthritis following a fall. He is s/p surgical debridement on 1/7, gross purulence was noted in the elbow joint and cxs are pending. Usual etiologies staph and strep. Cxs may remain suppressed due to use of oral suppressive antibiotics.        Recommendations:  1. Very odd story for infected knee, now + crystals ? Gout/pseudogout, also cxs all neg(on chronic augmentin but still for spontaneous infection it should grow thru this or be R  I favor bump augmentin to bid for 4 weeks stop IV no long line needed  Disposition when knee/other issues allow  Obviously if knee worsens despite this reconsider options  Follow inflamm markers as outpt  Discussed with ortho      Calderon Leija MD    Interval History   Underwent surgical debridement yesterday, gross purulence was noted in the elbow joint. Cxs are neg. Feels better crystals + and this fits better  Pain is better    Physical Exam   Temp: 97  F (36.1  C) Temp src: Temporal BP: (!) 152/73 Pulse: 56   Resp: 20 SpO2: 98 % O2 Device: None (Room air)    Vitals:    01/07/23 0510 01/08/23 0811   Weight: 97.4 kg (214 lb 12.8 oz) 100.5 kg (221 lb 9.6 oz)     Vital Signs with Ranges  Temp:  [97  F (36.1  C)-98.1  F (36.7  C)] 97  F (36.1  C)  Pulse:  [48-63] 56  Resp:  [16-20] 20  BP: (113-152)/(55-73) 152/73  SpO2:  [92 %-98 %] 98 %    Constitutional: Awake, alert, cooperative, no apparent distress  Lungs: Clear to auscultation bilaterally, no crackles or wheezing  Cardiovascular: Regular rate and rhythm, normal S1 and S2, and no  murmur noted  Abdomen: Normal bowel sounds, soft, non-distended, non-tender  Skin: No rash  MS :  R elbow in dressing, drain in place    Other:    Medications     - MEDICATION INSTRUCTIONS -         amoxicillin-clavulanate  1 tablet Oral Q12H [facility] (08/20)     mesalamine  800 mg Oral BID     metoprolol succinate ER  50 mg Oral Daily     rosuvastatin  40 mg Oral Daily     sodium chloride (PF)  10 mL Intracatheter Q8H     sodium chloride (PF)  3 mL Intracatheter Q8H     sodium chloride (PF)  3 mL Intracatheter Q8H     tamsulosin  0.4 mg Oral Daily     Warfarin Therapy Reminder  1 each Oral See Admin Instructions       Data   All microbiology laboratory data reviewed.  Recent Labs   Lab Test 01/09/23  0635 01/08/23  0726 01/07/23  0707 01/06/23  2333   WBC 8.6  --  11.2* 13.0*   HGB 11.5* 11.0* 11.1* 11.4*   HCT 36.8*  --  36.1* 35.4*   MCV 93  --  95 93     --  238 250     Recent Labs   Lab Test 01/09/23  0635 01/07/23  0707 01/06/23  1541   CR 0.69 0.74 0.95     Recent Labs   Lab Test 01/06/23  1541   SED 30*      Microbiology  1/7 operative cxs pending , stain negative  1/6 Blood cx negative

## 2023-01-09 NOTE — PROGRESS NOTES
Orthopedic Surgery  [facility]  01/09/2023     Admit Date:  1/6/2023    POD: 2 Days Post-Op   Procedure(s):  Right elbow arthrotomy, irrigation and debridement    Patient resting comfortably in bed.    Awaiting his breakfast.   Pain is well-controlled.  Denies numbness and tingling.  Tolerating oral intake.    Denies nausea or vomiting.  Denies chest pain or shortness of breath.    Temp:  [97  F (36.1  C)-98.1  F (36.7  C)] 97  F (36.1  C)  Pulse:  [48-63] 56  Resp:  [16-20] 20  BP: (113-152)/(55-73) 152/73  SpO2:  [92 %-98 %] 98 %    Alert and oriented.   Right upper extremity dressing is clean, dry, and intact.   Dressing and splint removed to pull bulb suction drain.  Drain with minimal serosanguineous output.  Surgical site over the lateral elbow is clean, dry, and intact with four sutures. No drainage.  Mild swelling about the elbow.  Trace erythema of the surrounding skin.   Right upper extremity is NVI.  Able to flex, extend, abduct, and adduct digits.  Radial pulse palpable.  Digits are warm and well-perfused.  Sensation intact to light touch.    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 S/p Right elbow arthrotomy, irrigation and debridement    Continue PTA Warfarin for DVT prophylaxis.     Currently on vancomycin. ID previously consulted. Patient reportedly on long-term Augmentin for prior TKA. Question if this is sufficient coverage for right elbow as NGTD and crystal analysis is positive for CPPD. Will await their recommendations.    Appreciate hospitalist recommendations for  CPPD treatment given that the patient is not an ideal candidate for NSAIDs due to chronic Warfarin use. Could consider steroid taper if not contraindicated and warranted.   Mobilize with PT/OT.    No lifting >5 pounds with the right upper extremity.    Continue current pain regiment.   Drain output reviewed in chart. Bulb suction drain removed today. Arm redressed/splint replaced.   Dressings:  Wear long arm splint for 10 days to support the elbow/quiet the soft tissues. Okay to remove splint and dressings for hygiene after 3 days postop. Then pat dry and reapply Adaptic, gauze/ABD pads, and splint padding before placing splint piece and Ace overwrap.   Follow-up: 2 weeks post-op with Dr. Christine/Gema Schaefer PA-C    2. Disposition   Anticipate d/c to home vs TCU when medically cleared and progressing in PT.    Nikki Whitaker PA-C  [facility]

## 2023-01-09 NOTE — PROGRESS NOTES
Patient  does not have iv abx coverage in the home with their Medicare and BCBS Supplement plan. Drug would be billed to the part D and supplies will be self-pay. Based on Vanco 1250mg q12h, total cost is $54.61/day for drug and supplies.    For nursing, patient should have coverage if homebound, however [facility] is not contracted with Medicare and an outside nursing agency would be utilized instead. If patient is not homebound, there is no coverage and I can see patient if patient agrees to self-pay for $90 per visit.    Patient should have coverage in a TCU or infusion center. Let us know how patient would like to proceed.      ([facility] Ackerman) In reference to admission date from 01/06 to check for iv abx coverage.    Please contact Intake with any questions, [phone] or In Basket pool, [facility] ([phone]).

## 2023-01-09 NOTE — PROGRESS NOTES
Patient vital signs are at baseline: Yes  Patient able to ambulate as they were prior to admission or with assist devices provided by therapies during their stay:  Yes  Patient MUST void prior to discharge:  Yes, voiding   Patient able to tolerate oral intake:  Yes  Pain has adequate pain control using Oral analgesics:  Yes, PRN oxycodone for pain control.   Does patient have an identified :  Yes  Has goal D/C date and time been discussed with patient:  Yes    A&Ox4, SBA. peripheral IV saline locked. Tolerating diet. Discharge home tomorrow.

## 2023-01-09 NOTE — PHARMACY-VANCOMYCIN DOSING SERVICE
Pharmacy Vancomycin Note  Date of Service 2023  Patient's  1940   82 year old, male    Indication: Bone and Joint Infection  Day of Therapy: 3  Current vancomycin regimen:  1000 mg IV q12h  Current vancomycin monitoring method: AUC  Current vancomycin therapeutic monitoring goal: 400-600 mg*h/L    InsightRX Prediction of Current Vancomycin Regimen  Regimen: 1000 mg IV every 12 hours.  Start time: 22:09 on 2023  Exposure target: AUC24 (range)400-600 mg/L.hr   AUC24,ss: 402 mg/L.hr  Probability of AUC24 > 400: 51 %  Ctrough,ss: 12.2 mg/L  Probability of Ctrough,ss > 20: 4 %  Probability of nephrotoxicity (Lodise SANTA ): 7 %      Current estimated CrCl = Estimated Creatinine Clearance: 86.9 mL/min (based on SCr of 0.74 mg/dL).    Creatinine for last 3 days  2023:  3:41 PM Creatinine 0.95 mg/dL  2023:  7:07 AM Creatinine 0.74 mg/dL    Recent Vancomycin Levels (past 3 days)  2023:  7:45 PM Vancomycin 12.2 ug/mL    Vancomycin IV Administrations (past 72 hours)                   vancomycin (VANCOCIN) 1000 mg in dextrose 5% 200 mL PREMIX (mg) 1,000 mg New Bag 23 2105     1,000 mg New Bag  0928     1,000 mg New Bag 23 2244     1,000 mg New Bag  0950    vancomycin (VANCOCIN) 2,500 mg in sodium chloride 0.9 % 500 mL intermittent infusion (mg) 2,500 mg New Bag 23 2156                Nephrotoxins and other renal medications (From now, onward)    Start     Dose/Rate Route Frequency Ordered Stop    23 0800  vancomycin (VANCOCIN) 1,250 mg in 0.9% NaCl 250 mL intermittent infusion         1,250 mg  over 90 Minutes Intravenous EVERY 12 HOURS 23 211               Contrast Orders - past 72 hours (72h ago, onward)    None          Interpretation of levels and current regimen:  Vancomycin level is reflective of -600, in low range (AUC24,ss: 402 mg/L.hr)    Has serum creatinine changed greater than 50% in last 72 hours: No    Renal Function: Stable    InsightRX  Prediction of Planned New Vancomycin Regimen  Loading dose: none  Regimen: 1250 mg IV every 12 hours.  Start time: 08:00 on 01/09/2023  Exposure target: AUC24 (range)400-600 mg/L.hr   AUC24,ss: 494 mg/L.hr  Probability of AUC24 > 400: 88 %  Ctrough,ss: 15 mg/L  Probability of Ctrough,ss > 20: 14 %  Probability of nephrotoxicity (Lodise SANTA 2009): 10 %    Plan:  1. Increase Dose to 1250mg iv q12h (to get higher in range)  2. Vancomycin monitoring method: AUC  3. Vancomycin therapeutic monitoring goal: 400-600 mg*h/L  4. Pharmacy will check vancomycin levels as appropriate in 1-3 Days.  5. Serum creatinine levels will be checked as ordered.    Lc Salmon RP

## 2023-01-09 NOTE — PROGRESS NOTES
[facility]    Medicine Progress Note - Hospitalist Service    Date of Admission:  1/6/2023    Assessment & Plan   Summary of Stay: Fausto Farr is a 82 year old male admitted on 1/6/2023 with septic right elbow. 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  He fell in his driveway on 1/2/2023.  He had right shoulder pain as well as some back pain.  He went to the urgency room for evaluation and had unremarkable x-rays.  Yesterday he developed right elbow pain.  He went to [place] urgent care for evaluation and was referred to the emergency department.     Emergency department evaluation showed stable vital signs.  Laboratory evaluation showed white blood cells 13.8, C-reactive protein 178.71, and INR 1.5.  Right elbow is erythematous and warm.  X-ray of elbow was unremarkable. Arthrocentesis was performed.  The fluid showed 87,610 nucleated cells, 91% of which were neutrophils.  X-ray of elbow was unremarkable.  He was given cefepime and vancomycin.  Orthopedic surgery was consulted by phone and recommended vitamin K and n.p.o. after midnight in preparation for surgery.     Problem list:  Septic right elbow treated    on augmentin per ID   will follow with ortho  them in two weeks      Chronic anticoagulation with warfarin for mechanical aortic and mitral valve replacements  Not at goal   contnue warfarin at 12.5  Bridging lovenox    -Start heparin  drip, low intensity without bolus, and run until noon and then stop in anticipation of surgery today at 1700; that should give 4 or 5 hours of heparin before surgery.  -Resume heparin drip after surgery once okay with orthopedics.  -Cefepime and vancomycin was given in the emergency department.  Now on Ancef and vancomycin.  -Orthopedic surgery consult.  -Infectious disease consult.  -Analgesics and antiemetics as needed.    Pseudogout  Follow      Stable Coronary artery disease  Previous bypass surgery  Diastolic congestive heart failure without acute exacerbation  Hypertension  TIA  -Resumed prior to admission metoprolol and Crestor.     Bradycardia  -- reduce metoprolol      Mechanical aortic valve replacement  Mechanical mitral valve replacement  Paroxysmal atrial fibrillation  Chronic anticoagulation with warfarin  Subtherapeutic INR 1.5  -Was given vitamin K in the emergency department.  -Heparin drip until noon in anticipation of surgery.   lovenox 100mg subcutaneous x1  12.5mg warfarin tonight      Previous abdominal aortic aneurysm repair  Peripheral vascular disease  Mesenteric artery insufficiency  -No acute concerns.     History of infected total knee arthroplasty  On chronic antibiotics depression with Augmentin  -Hold Augmentin with current antibiotics.     Ulcerative colitis  -Resume prior to admission mesalamine.     Benign prostatic hypertrophy  -Resume prior to admission Flomax.     DVT Prophylaxis: Heparin   Code Status: Full Code  Diet: NPO per Anesthesia Guidelines for Procedure/Surgery Except for: Meds    Lord Catheter: Not present  Disposition: Expected discharge in 2-3 days to home. Goals prior to discharge include surgery and anticoagulation.   Incidental Findings: None.  Family updated today: No                 Diet: Advance Diet as Tolerated: Regular Diet Adult    DVT Prophylaxis:   Lord Catheter: Not present  Lines: None     Cardiac Monitoring: None  Code Status: Full Code              "  Diet: Advance Diet as Tolerated: Regular Diet Adult    DVT Prophylaxis: Enoxaparin (Lovenox) SQ  Lord Catheter: Not present  Lines: None     Cardiac Monitoring: None  Code Status: Full Code      Clinically Significant Risk Factors                        # Obesity: Estimated body mass index is 35.77 kg/m  as calculated from the following:    Height as of this encounter: 1.676 m (5' 6\").    Weight as of this encounter: 100.5 kg (221 lb 9.6 oz)., PRESENT ON ADMISSION         Disposition Plan  home      Expected Discharge Date: 01/10/2023        Discharge Comments: Home Monday.  Possible Home Abx.          Oscar Haq MD  Hospitalist Service  [facility]  Securely message with [employer] (more info)  Text page via [email or web address] Paging/Directory   ______________________________________________________________________    Interval History   No events, pain controlled   Appreciated ID and ORTH     Physical Exam   Vital Signs: Temp: 97  F (36.1  C) Temp src: Temporal BP: (!) 152/73 Pulse: 56   Resp: 20 SpO2: 98 % O2 Device: None (Room air)    Weight: 221 lbs 9.6 oz    General Appearance:  *well developed  Respiratory: cta,slightly reduced   Cardiovascular:RRR,murmus present   GI: unremarkable   Skin: right arm wrapped to elbo   Other:  aaox3     I have personally reviewed the following data over the past 24 hrs:    8.6  \   11.5 (L)   / 311     138 102 16.0 /  121 (H)   4.0 27 0.69 \       Procal: N/A CRP: 70.84 (H) Lactic Acid: N/A       INR:  1.37 (H) PTT:  N/A   D-dimer:  N/A Fibrinogen:  N/A       Imaging results reviewed over the past 24 hrs:   No results found for this or any previous visit (from the past 24 hour(s)).  "

## 2023-01-09 NOTE — PROGRESS NOTES
[facility]    Received request for benefit check for home IV abx. Patient does not have IV abx coverage in the home with their Medicare and BCBS Supplement plan. Drug would be billed to the part D and supplies will be self-pay. Based on Vanco 1250mg q12h, total cost is $54.61/day for drug and supplies. For nursing, patient should have coverage if homebound, however \[facility]\"" is not contracted with Medicare and an outside nursing agency would be utilized instead. If patient is not homebound, there is no coverage and \[facility]\"" can see patient if patient agrees to self-pay for $90 per visit.    Thank you    Diane Strange RN  [facility] Liaison  [phone] (Mon thru Fri 8am - 5pm)  [phone] Office

## 2023-01-09 NOTE — PROGRESS NOTES
Occupational Therapy Discharge Summary    Reason for therapy discharge:    All goals and outcomes met, no further needs identified.    Progress towards therapy goal(s). See goals on Care Plan in [facility] electronic health record for goal details.  Goals met    Therapy recommendation(s):    No further therapy is recommended. defer therapy rec to ortho at follow up apt. May benefit from OP PT/OT UE speciality. indp mobility, Able to demo safe ADL within NWB and movement restrictions given splint. Pt and family verbalized HEP and splint wearing schedule that was provided by ortho team.

## 2023-01-10 ENCOUNTER — TELEPHONE (OUTPATIENT)
Dept: PEDIATRICS | Facility: CLINIC | Age: 83
End: 2023-01-10

## 2023-01-10 VITALS
BODY MASS INDEX: 34.44 KG/M2 | RESPIRATION RATE: 18 BRPM | HEIGHT: 66 IN | SYSTOLIC BLOOD PRESSURE: 127 MMHG | WEIGHT: 214.3 LBS | HEART RATE: 60 BPM | TEMPERATURE: 96.9 F | OXYGEN SATURATION: 95 % | DIASTOLIC BLOOD PRESSURE: 55 MMHG

## 2023-01-10 DIAGNOSIS — Z79.01 LONG TERM CURRENT USE OF ANTICOAGULANT THERAPY: ICD-10-CM

## 2023-01-10 DIAGNOSIS — Z95.2 S/P AORTIC VALVE REPLACEMENT: Primary | ICD-10-CM

## 2023-01-10 DIAGNOSIS — Z95.2 S/P MITRAL VALVE REPLACEMENT: ICD-10-CM

## 2023-01-10 LAB
ANION GAP SERPL CALCULATED.3IONS-SCNC: 9 MMOL/L (ref 7–15)
BUN SERPL-MCNC: 14 MG/DL (ref 8–23)
CALCIUM SERPL-MCNC: 8.8 MG/DL (ref 8.8–10.2)
CHLORIDE SERPL-SCNC: 101 MMOL/L (ref 98–107)
CREAT SERPL-MCNC: 0.72 MG/DL (ref 0.67–1.17)
DEPRECATED HCO3 PLAS-SCNC: 29 MMOL/L (ref 22–29)
ERYTHROCYTE [DISTWIDTH] IN BLOOD BY AUTOMATED COUNT: 12.5 % (ref 10–15)
GFR SERPL CREATININE-BSD FRML MDRD: >90 ML/MIN/1.73M2
GLUCOSE SERPL-MCNC: 111 MG/DL (ref 70–99)
HCT VFR BLD AUTO: 38.7 % (ref 40–53)
HGB BLD-MCNC: 12.2 G/DL (ref 13.3–17.7)
INR PPP: 1.7 (ref 0.85–1.15)
MCH RBC QN AUTO: 29.4 PG (ref 26.5–33)
MCHC RBC AUTO-ENTMCNC: 31.5 G/DL (ref 31.5–36.5)
MCV RBC AUTO: 93 FL (ref 78–100)
PLATELET # BLD AUTO: 319 10E3/UL (ref 150–450)
POTASSIUM SERPL-SCNC: 4 MMOL/L (ref 3.4–5.3)
RBC # BLD AUTO: 4.15 10E6/UL (ref 4.4–5.9)
SODIUM SERPL-SCNC: 139 MMOL/L (ref 136–145)
WBC # BLD AUTO: 6.9 10E3/UL (ref 4–11)

## 2023-01-10 PROCEDURE — 250N000013 HC RX MED GY IP 250 OP 250 PS 637: Performed by: INTERNAL MEDICINE

## 2023-01-10 PROCEDURE — 85610 PROTHROMBIN TIME: CPT | Performed by: PHYSICIAN ASSISTANT

## 2023-01-10 PROCEDURE — 250N000013 HC RX MED GY IP 250 OP 250 PS 637: Performed by: PHYSICIAN ASSISTANT

## 2023-01-10 PROCEDURE — 250N000011 HC RX IP 250 OP 636: Performed by: INTERNAL MEDICINE

## 2023-01-10 PROCEDURE — 99239 HOSP IP/OBS DSCHRG MGMT >30: CPT | Performed by: INTERNAL MEDICINE

## 2023-01-10 PROCEDURE — 36415 COLL VENOUS BLD VENIPUNCTURE: CPT | Performed by: INTERNAL MEDICINE

## 2023-01-10 PROCEDURE — 85027 COMPLETE CBC AUTOMATED: CPT | Performed by: INTERNAL MEDICINE

## 2023-01-10 PROCEDURE — 82310 ASSAY OF CALCIUM: CPT | Performed by: INTERNAL MEDICINE

## 2023-01-10 RX ORDER — WARFARIN SODIUM 7.5 MG/1
7.5 TABLET ORAL ONCE
Status: COMPLETED | OUTPATIENT
Start: 2023-01-10 | End: 2023-01-10

## 2023-01-10 RX ADMIN — OXYCODONE HYDROCHLORIDE 5 MG: 5 TABLET ORAL at 04:25

## 2023-01-10 RX ADMIN — ENOXAPARIN SODIUM 100 MG: 100 INJECTION SUBCUTANEOUS at 04:26

## 2023-01-10 RX ADMIN — METOPROLOL SUCCINATE 50 MG: 50 TABLET, EXTENDED RELEASE ORAL at 08:46

## 2023-01-10 RX ADMIN — ROSUVASTATIN CALCIUM 40 MG: 20 TABLET, FILM COATED ORAL at 08:46

## 2023-01-10 RX ADMIN — AMOXICILLIN AND CLAVULANATE POTASSIUM 1 TABLET: 875; 125 TABLET, FILM COATED ORAL at 08:46

## 2023-01-10 RX ADMIN — WARFARIN SODIUM 7.5 MG: 7.5 TABLET ORAL at 11:34

## 2023-01-10 RX ADMIN — TAMSULOSIN HYDROCHLORIDE 0.4 MG: 0.4 CAPSULE ORAL at 08:46

## 2023-01-10 RX ADMIN — MESALAMINE 800 MG: 800 TABLET, DELAYED RELEASE ORAL at 08:46

## 2023-01-10 RX ADMIN — OXYCODONE HYDROCHLORIDE 2.5 MG: 5 TABLET ORAL at 09:14

## 2023-01-10 ASSESSMENT — ACTIVITIES OF DAILY LIVING (ADL)
ADLS_ACUITY_SCORE: 27
ADLS_ACUITY_SCORE: 31
ADLS_ACUITY_SCORE: 27

## 2023-01-10 NOTE — PLAN OF CARE
Goal Outcome Evaluation:      Plan of Care Reviewed With: patient    Overall Patient Progress: improvingOverall Patient Progress: improving     Patient vital signs are at baseline: Yes  Patient able to ambulate as they were prior to admission or with assist devices provided by therapies during their stay:  Yes; SBA  Patient MUST void prior to discharge:  Yes  Patient able to tolerate oral intake:  Yes  Pain has adequate pain control using Oral analgesics:  Yes; PRN oxycodone  Does patient have an identified :  Yes  Has goal D/C date and time been discussed with patient:  Yes     Pt A/O x4. VVS. PIV SL. Pain managed with PRN oxycodone. SBA with gait belt. Voiding good amounts. Tolerating a regular diet well. Plan is discharge home today.

## 2023-01-10 NOTE — PHARMACY-ANTICOAGULATION SERVICE
Clinical Pharmacy- Warfarin Discharge Note    Patient is discharging on prior to admission warfarin dose.    Anticoagulation Dose History     Recent Dosing and Labs Latest Ref Rng & Units 11/30/2022 1/4/2023 1/6/2023 1/7/2023 1/8/2023 1/9/2023 1/10/2023    MAYLIN IMS TEMPLATE - - - - - -  -    Warfarin 5 mg - - - - - 5 mg - -    Warfarin 7.5 mg - - - - 7.5 mg 7.5 mg - -    INR 0.85 - 1.15 3.0(H) 4.0(H) 1.50(H) - 1.26(H) 1.37(H) 1.70(H)

## 2023-01-10 NOTE — TELEPHONE ENCOUNTER
ANTICOAGULATION  MANAGEMENT: Discharge Review    Fausto Farr chart reviewed for anticoagulation continuity of care    Hospital Admission on 1/7-1/10 for right elbow pain/arthrotomy, gout.    Discharge disposition: Home    Results:    Recent labs: (last 7 days)     01/04/23  0929 01/06/23  2111 01/07/23  0707 01/08/23  0726 01/09/23  0635 01/10/23  0737   INR 4.0* 1.50*  --  1.26* 1.37* 1.70*   AAUFH  --   --  <0.10  --   --   --      Anticoagulation inpatient management:     reversed with vitamin K on1/6/23 and more warfarin administered than maintenance regimen    Also lovenox administered 1/8-1/10, heparin gtt 1/6-1/7    Anticoagulation discharge instructions:     Warfarin dosing: increase dose to 12.5 mg daily - discharge notes are not thorough but seem to indicate a dosing change of 12.5 mg daily. Will update calendar accordingly.   Bridging: No   INR goal change: No - goal still at 2.5-3.5     Medication changes affecting anticoagulation: No, unless taking more tylenol to manage pain    Additional factors affecting anticoagulation: Yes: gout     PLAN     No adjustment to anticoagulation plan needed  Agree with dosing adjustment on discharge    Left a detailed message for Ralph- please call to set up INR check on Friday, 1/13 and let [facility] know if taking warfarin differently than 12.5 mg daily    Anticoagulation Calendar updated    Maria Luz Rios RN

## 2023-01-10 NOTE — DISCHARGE SUMMARY
[facility]  Hospitalist Discharge Summary      Date of Admission:  1/6/2023  Date of Discharge:  1/10/2023 11:49 AM  Discharging Provider: Oscar Haq MD  Discharge Service: Hospitalist Service    Discharge Diagnoses   R elbow effusion (pseudo gout)   R elbow cellulitis sp ORIF orthopedics   Anticoagulation management, home warfarin inr 2.5-3.5 (MVR and AVR)   Fall  Multiple comorbidies       Consultants  Dr. Calderon Leija ID   Dr. Jose A Christine Ortho    Follow-ups Needed After Discharge   Follow-up Appointments     Follow-up and recommended labs and tests       Follow up with Dr. Jose A Christine/Gema Schaefer PA-C at [facility]   Orthopedics ([place] or [place]) within 14 days postoperatively. No   follow up labs or test are needed prior to visit. At this visit x-rays   will be taken, sutures/staples removed, and questions to be answered.   Bring any needed forms with to appointment.    Call for appointment: [phone]  After hours: [phone]  Fax: [phone] or [phone]         Follow-up and recommended labs and tests       Cbc and crp in about week time primary physician can order         {Additional follow-up instructions/to-do's for PCP   None     Unresulted Labs Ordered in the Past 30 Days of this Admission     Date and Time Order Name Status Description    1/7/2023  6:19 PM Synovial fluid Aerobic Bacterial Culture Routine Preliminary     1/7/2023  6:19 PM Anaerobic Bacterial Culture Routine Preliminary     1/6/2023  9:05 PM Blood Culture Arm, Right Preliminary     1/6/2023  8:57 PM Blood Culture Peripheral Blood Preliminary     1/6/2023  6:37 PM Synovial fluid Aerobic Bacterial Culture Routine Preliminary       These results will be followed up by ID, Primary Care     Discharge Disposition   Discharged to home  Condition at discharge: Stable      Hospital Course   Summary of Stay: Fausto Farr is a 82 year old male admitted on 1/6/2023 with septic right elbow.  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  He fell in his driveway on 1/2/2023.  He had right shoulder pain as well as some back pain.  He went to the urgency room for evaluation and had unremarkable x-rays.  Yesterday he developed right elbow pain.  He went to [place] urgent care for evaluation and was referred to the emergency department.     Emergency department evaluation showed stable vital signs.  Laboratory evaluation showed white blood cells 13.8, C-reactive protein 178.71, and INR 1.5.  Right elbow is erythematous and warm.  X-ray of elbow was unremarkable. Arthrocentesis was performed.  The fluid showed 87,610 nucleated cells, 91% of which were neutrophils.  X-ray of elbow was unremarkable.  He was given cefepime and vancomycin.  Orthopedic surgery was consulted by phone and recommended vitamin K and n.p.o. after midnight in preparation for surgery.     Problem list:  Septic right elbow treated    on augmentin per ID   will follow with ortho  them in two weeks   Debridement and drainage by ortho   Cefepime and vanco, polymicrobial culture, plus Calcium oxylate crystal    focused to augmentin by ID for discharge        Mechanical aortic valve replacement  Mechanical mitral valve replacement  Paroxysmal atrial fibrillation  Chronic anticoagulation with warfarin  Chronic anticoagulation with warfarin for mechanical aortic and mitral valve replacements  Not at goal    contnue warfarin at 12.5  Last night, and today (7.5mg in AM here and 5mg in PM) resume outpatient   -Start heparin drip,  Lovenox around surgery     -Was given vitamin K in the emergency department.      Pseudogout  Follow      Stable Coronary artery disease  Previous bypass surgery  Diastolic congestive heart failure without acute exacerbation  Hypertension  TIA  -Resumed prior to admission metoprolol and Crestor.     Bradycardia  -- reduce metoprolol for admission resume regular dose        Previous abdominal aortic aneurysm repair  Peripheral vascular disease  Mesenteric artery insufficiency  -No acute concerns.     History of infected total knee arthroplasty  On chronic antibiotics depression with Augmentin  -Hold Augmentin with current antibiotics.     Ulcerative colitis  -Resume prior to admission mesalamine.     Benign prostatic hypertrophy  -Resume prior to admission Flomax.     DVT Prophylaxis: Heparin   Code Status: Full Code  Diet: NPO per Anesthesia Guidelines for Procedure/Surgery Except for: Meds    Lord Catheter: Not present  Disposition: Expected discharge in 2-3 days to home. Goals prior to discharge include surgery and anticoagulation.   Incidental Findings: None.  Family updated today: No                 Diet: Advance Diet as Tolerated: Regular Diet Adult    DVT Prophylaxis:   Lord Catheter: Not present  Lines: None     Cardiac Monitoring: None  Code Status: Full Code              Consultations This Hospital Stay   PHARMACY TO DOSE VANCO  ORTHOPEDIC SURGERY IP CONSULT  INFECTIOUS DISEASES IP CONSULT  PHARMACY IP CONSULT  PHARMACY IP CONSULT  PHARMACY TO DOSE VANCO  OCCUPATIONAL THERAPY ADULT IP CONSULT  PHARMACY TO DOSE WARFARIN  INFECTIOUS DISEASES IP CONSULT  PHYSICAL THERAPY ADULT IP CONSULT  CARE MANAGEMENT / SOCIAL WORK IP CONSULT  VASCULAR ACCESS ADULT IP CONSULT    Code Status   Full Code    Time Spent on this Encounter   Oscar REECE MD, personally saw the patient today and spent  "greater than 30 minutes discharging this patient.       Oscar Haq MD  [facility]  [address]  [facility] [address]  Phone: [phone]  Fax: [phone]  ______________________________________________________________________    Physical Exam   Vital Signs: Temp: 96.9  F (36.1  C) Temp src: Temporal BP: 127/55 Pulse: 60   Resp: 18 SpO2: 95 % O2 Device: None (Room air)    Weight: 214 lbs 4.8 oz        Constitutional: Awake, alert, cooperative, no apparent distress  Lungs: Clear to auscultation bilaterally, no crackles or wheezing  Cardiovascular: Regular rate and rhythm, normal S1 and S2, and no murmur noted  Abdomen: Normal bowel sounds, soft, non-distended, non-tender  Skin: No rash  MS :  R elbow in dressing, drain in place    Primary Care Physician   Chris Flower    Discharge Orders      Primary Care - Care Coordination Referral      Medication Therapy Management Referral      Reason for your hospital stay    Right elbow pain, concern for septic joint but found to be negative for infection and positive for pseudogout     Follow-up and recommended labs and tests     Follow up with Dr. Jose A Christine/Gema Schaefer PA-C at [facility] ([place] or [place]) within 14 days postoperatively. No follow up labs or test are needed prior to visit. At this visit x-rays will be taken, sutures/staples removed, and questions to be answered. Bring any needed forms with to appointment.    Call for appointment: [phone]  After hours: [phone]  Fax: [phone] or [phone]     Activity    Your activity upon discharge: No lifting more than 5 pounds with the right upper extremity. Remain in splint at all times aside from hygiene for 10 days postoperatively to allow soft tissues to \"calm down.\" Okay to remove when showering and remove dressings and then reapply after as discussed while in the hospital. No soaking or submerging surgical site until cleared. "     Reason for your hospital stay    Infected elbow     Follow-up and recommended labs and tests     Cbc and crp in about week time primary physician can order     Activity    Your activity upon discharge: not strenuous, help with dressing, rising from chair etc     Diet    Prior cardiac diet       Significant Results and Procedures        Most Recent 3 CBC's:Recent Labs   Lab Test 01/10/23  0737 01/09/23  0635 01/08/23  0726 01/07/23  0707   WBC 6.9 8.6  --  11.2*   HGB 12.2* 11.5* 11.0* 11.1*   MCV 93 93  --  95    311  --  238     Most Recent 3 BMP's:Recent Labs   Lab Test 01/10/23  0737 01/09/23  0635 01/08/23  0804 01/08/23  0726 01/07/23  1859 01/07/23  0707    138  --   --   --  138   POTASSIUM 4.0 4.0  --  3.9  --  3.8   CHLORIDE 101 102  --   --   --  101   CO2 29 27  --   --   --  25   BUN 14.0 16.0  --   --   --  14.7   CR 0.72 0.69  --   --   --  0.74   ANIONGAP 9 9  --   --   --  12   AWILDA 8.8 8.5*  --   --   --  8.7*   * 121* 137*  --    < > 110*    < > = values in this interval not displayed.     Most Recent 2 LFT's:Recent Labs   Lab Test 07/27/22  0822 07/26/21  1025   AST 27 27   ALT 28 30   ALKPHOS 56 61   BILITOTAL 0.4 0.4     Most Recent 3 INR's:Recent Labs   Lab Test 01/10/23  0737 01/09/23  0635 01/08/23  0726   INR 1.70* 1.37* 1.26*     Most Recent INR's and Anticoagulation Dosing History:  Anticoagulation Dose History     Recent Dosing and Labs Latest Ref Rng & Units 11/30/2022 1/4/2023 1/6/2023 1/7/2023 1/8/2023 1/9/2023 1/10/2023    ZZ IMS TEMPLATE - - - - - -  -    Warfarin 5 mg - - - - - 5 mg - -    Warfarin 7.5 mg - - - - 7.5 mg 7.5 mg - 7.5 mg    INR 0.85 - 1.15 3.0(H) 4.0(H) 1.50(H) - 1.26(H) 1.37(H) 1.70(H)        Most Recent 3 Creatinines:Recent Labs   Lab Test 01/10/23  0737 01/09/23  0635 01/07/23  0707   CR 0.72 0.69 0.74     Most Recent 3 Hemoglobins:Recent Labs   Lab Test 01/10/23  0737 01/09/23  0635 01/08/23  0726   HGB 12.2* 11.5* 11.0*     Most Recent 3  Troponin's:Recent Labs   Lab Test 05/18/18  0550 05/18/18  0200   TROPI 0.029 0.042     Most Recent 3 BNP's:No lab results found.  Most Recent D-dimer:No lab results found.  Most Recent Cholesterol Panel:Recent Labs   Lab Test 07/27/22  0822   CHOL 123   LDL 64   HDL 34*   TRIG 124     7-Day Micro Results     Collected Updated Procedure Result Status      01/07/2023 1817 01/09/2023 2347 Anaerobic Bacterial Culture Routine [[id number]]   Synovial fluid from Elbow, Right    Preliminary result Component Value   Culture No anaerobic organisms isolated after 2 days  [P]                01/07/2023 1817 01/08/2023 0034 Gram Stain [[id number]]   Synovial fluid from Elbow, Right    Final result Component Value   Gram Stain Result No organisms seen   Gram Stain Result 3+ WBC seen   Predominantly PMNs            01/07/2023 1817 01/10/2023 0938 Synovial fluid Aerobic Bacterial Culture Routine [[id number]]   (Abnormal)   Synovial fluid from Elbow, Right    Preliminary result Component Value   Culture Culture in progress  [P]     Isolated in broth only Gram positive cocci in clusters  [P]     On day 3 of incubation               01/06/2023 2314 01/06/2023 2350 Asymptomatic COVID-19 Virus (Coronavirus) by PCR Nasopharyngeal [[id number]]    Swab from Nasopharyngeal    Final result Component Value   SARS CoV2 PCR Negative   NEGATIVE: SARS-CoV-2 (COVID-19) RNA not detected, presumed negative.            01/06/2023 2117 01/09/2023 2347 Blood Culture Arm, Right [[id number]]   Blood from Arm, Right    Preliminary result Component Value   Culture No growth after 3 days  [P]                01/06/2023 2111 01/09/2023 2347 Blood Culture Peripheral Blood [[id number]]   Peripheral Blood    Preliminary result Component Value   Culture No growth after 3 days  [P]                01/06/2023 1917 01/07/2023 0254 Crystal ID Synovial Fluid [[id number]]   (Abnormal)   Synovial fluid from Elbow, Right    Final result Component Value    Crystals Analysis Positive for intracellular crystals, consistent with calcium pyrophosphate dihydrate crystals.            01/06/2023 1917 01/06/2023 2053 Cell count with differential fluid [[id number]]    (Abnormal)   Synovial fluid from Elbow, Right    Final result Component Value   No component results            01/06/2023 1917 01/06/2023 2003 Glucose fluid [[id number]]    Synovial fluid from Elbow, Right    Final result Component Value Units   Glucose Fluid Source Elbow, Right    Glucose fluid 29 mg/dL            01/06/2023 1917 01/06/2023 1948 Protein fluid [[id number]]    Synovial fluid from Elbow, Right    Final result Component Value Units   Protein Fluid Source Elbow, Right    Protein Total Fluid 5.2 g/dL            01/06/2023 1917 01/06/2023 2306 Gram stain [[id number]]   Synovial fluid from Elbow, Right    Final result Component Value   Gram Stain Result No organisms seen   Gram Stain Result 4+ WBC seen   Predominantly PMNs            01/06/2023 1917 01/10/2023 0819 Synovial fluid Aerobic Bacterial Culture Routine [[id number]]   Synovial fluid from Elbow, Right    Preliminary result Component Value   Culture No growth after 3 days  [P]                01/06/2023 1917 01/06/2023 2037 Cell Count Body Fluid [[id number]]    (Abnormal)   Synovial fluid from Elbow, Right    Final result Component Value Units   Color Yellow    Clarity Cloudy    Total Nucleated Cells 87,610 /uL   Cell Count Fluid Source Elbow, Right             01/06/2023 1917 01/06/2023 2053 Differential Body Fluid [[id number]]    Synovial fluid from Elbow, Right    Final result Component Value Units   % Neutrophils 91 %   % Lymphocytes 2 %   % Monocyte/Macrophages 7 %                Most Recent TSH and T4:Recent Labs   Lab Test 11/13/19  0804   TSH 0.54     Most Recent Hemoglobin A1c:Recent Labs   Lab Test 07/27/22  0822   A1C 6.5*     Most Recent 6 glucoses:Recent Labs   Lab Test 01/10/23  0737 01/09/23  0635 01/08/23  0804  01/07/23  1859 01/07/23  0707 01/06/23  1541   * 121* 137* 109* 110* 115*     Most Recent Urinalysis:Recent Labs   Lab Test 05/17/18  2311 06/20/16  0946   COLOR Yellow Yellow   APPEARANCE Clear Clear   URINEGLC Negative Negative   URINEBILI Negative Negative   URINEKETONE Negative Negative   SG 1.019 1.010   UBLD Negative Negative   URINEPH 5.5 5.5   PROTEIN 10* Negative   UROBILINOGEN  --  0.2   NITRITE Negative Negative   LEUKEST Negative Negative   RBCU 0  --    WBCU <1  --      Most Recent ABG:No lab results found.  Most Recent ESR & CRP:Recent Labs   Lab Test 01/09/23  0635 01/06/23  1541   SED  --  30*   CRP 70.84* 178.71*     Most Recent Anemia Panel:Recent Labs   Lab Test 01/10/23  0737 10/29/15  2025 10/28/15  1428   WBC 6.9   < > 8.3   HGB 12.2*   < > 13.2*   HCT 38.7*   < > 40.0   MCV 93   < > 89      < > 290   ANDREW  --   --  183    < > = values in this interval not displayed.   ,   Results for orders placed or performed in visit on 01/06/23   XR Elbow Right G/E 3 Views    Narrative    ELBOW RIGHT THREE OR MORE VIEWS    1/6/2023 11:44 AM     HISTORY:  Right elbow pain    COMPARISON: None.      Impression    IMPRESSION: Mild osteoarthrosis of the elbow. There is no evidence of  fracture. There is soft tissue calcification about the medial and  lateral aspects of the elbow which may be from long-standing  tendon/ligament/capsule degeneration. A component of chondrocalcinosis  cannot be ruled out. Extensive arterial calcifications.    JUAN CARLOS MOORE MD         SYSTEM ID:  [id number]     *Note: Due to a large number of results and/or encounters for the requested time period, some results have not been displayed. A complete set of results can be found in Results Review.       Discharge Medications   Discharge Medication List as of 1/10/2023 11:21 AM      START taking these medications    Details   ondansetron (ZOFRAN ODT) 4 MG ODT tab Take 1 tablet (4 mg) by mouth every 6 hours as needed for  nausea or vomiting, Disp-5 tablet, R-0, E-Prescribe      oxyCODONE (ROXICODONE) 5 MG tablet Take 1 tablet (5 mg) by mouth every 4 hours as needed for severe pain (7-10) (IF pain not managed with non-pharmacological and non-opioid interventions), Disp-15 tablet, R-0, Local Print      polyethylene glycol (MIRALAX) 17 GM/Dose powder Take 17 g by mouth daily, Disp-510 g, R-0, E-Prescribe      senna-docusate (SENOKOT-S/PERICOLACE) 8.6-50 MG tablet Take 1 tablet by mouth 2 times daily as needed for constipation, Disp-14 tablet, R-0, E-Prescribe         CONTINUE these medications which have CHANGED    Details   acetaminophen (TYLENOL) 325 MG tablet Take 2 tablets (650 mg) by mouth every 6 hours as needed for mild pain or other (and adjunct with moderate or severe pain or per patient request), Disp-100 tablet, R-0, E-Prescribe      amoxicillin-clavulanate (AUGMENTIN) 875-125 MG tablet Take 1 tablet by mouth 2 times daily, Disp-30 tablet, R-0, E-Prescribe         CONTINUE these medications which have NOT CHANGED    Details   betamethasone valerate (VALISONE) 0.1 % external lotion Apply topically 2 times daily Apply topically to scalp twice daily PRNDisp-60 mL, [id number]      cholecalciferol 25 MCG (1000 UT) TABS Take 1,000 Units by mouth daily, Historical      ezetimibe (ZETIA) 10 MG tablet Take 1 tablet (10 mg) by mouth daily, Disp-90 tablet, R-3, Local Print      ferrous sulfate (FEROSUL) 325 (65 Fe) MG tablet Take 325 mg by mouth daily (with breakfast), Historical      fluocinonide (LIDEX) 0.05 % external ointment Apply topically 2 times dailyDisp-60 g, R-11Local Print      glucosamine-chondroitin 500-400 MG CAPS per capsule Take 1 capsule by mouth every evening, Historical      mesalamine (ASACOL HD) 800 MG EC tablet Take 1 tablet (800 mg) by mouth 2 times daily, Disp-180 tablet, R-3, Local Print      metoprolol succinate ER (TOPROL XL) 50 MG 24 hr tablet Take 1 1/2 tab (75mg) daily, Disp-135 tablet, R-1,  E-Prescribe      rosuvastatin (CRESTOR) 40 MG tablet Take 1 tablet (40 mg) by mouth daily, Disp-90 tablet, R-3, Local Print      tamsulosin (FLOMAX) 0.4 MG capsule Take 1 capsule (0.4 mg) by mouth daily, Disp-90 capsule, R-3, E-Prescribe      triamcinolone (KENALOG) 0.1 % external cream Apply topically 2 times dailyDisp-85.2 g, [id number]      warfarin ANTICOAGULANT (COUMADIN) 5 MG tablet Take 5mg every Sun & Wed / Take 7.5mg all other days OR per INR clinic, Disp-120 tablet, R-1, E-Prescribe         STOP taking these medications       HYDROcodone-acetaminophen (NORCO) 5-325 MG tablet Comments:   Reason for Stopping:         triamcinolone (KENALOG) 0.1 % external lotion Comments:   Reason for Stopping:             Allergies   Allergies   Allergen Reactions     Bees Anaphylaxis

## 2023-01-10 NOTE — PLAN OF CARE
Goal Outcome Evaluation:      Plan of Care Reviewed With: patient           Patient vital signs are at baseline: Yes  Patient able to ambulate as they were prior to admission or with assist devices provided by therapies during their stay:  Yes  Patient MUST void prior to discharge:  Yes, voiding   Patient able to tolerate oral intake:  Yes  Pain has adequate pain control using Oral analgesics:  Yes, PRN oxycodone for pain control.   Does patient have an identified :  Yes  Has goal D/C date and time been discussed with patient:  Yes     A&Ox4, Tolerating diet. Ace wrap/splint to right arm CDI. Discharge home tomorrow.

## 2023-01-11 ENCOUNTER — TELEPHONE (OUTPATIENT)
Dept: PEDIATRICS | Facility: CLINIC | Age: 83
End: 2023-01-11

## 2023-01-11 LAB
BACTERIA BLD CULT: NO GROWTH
BACTERIA BLD CULT: NO GROWTH
BACTERIA SNV CULT: NO GROWTH

## 2023-01-11 RX ORDER — ENOXAPARIN SODIUM 100 MG/ML
100 INJECTION SUBCUTANEOUS EVERY 12 HOURS
Qty: 6 ML | Refills: 0 | Status: CANCELLED | OUTPATIENT
Start: 2023-01-11

## 2023-01-11 NOTE — TELEPHONE ENCOUNTER
Consulted Irma Salas [facility] to determine if lovenox should have been prescribed at discharge.  Per cardiology note from 9/20/22, patient should be bridged with lovenox if INR drops below 2.5.  Irma recommended that patient restart lovenox bridging at this time.    Called and spoke with patient.  Advised him that his INR yesterday morning was 1.7 which puts him at a high risk for blood clots or stroke without lovenox bridging and lovenox bridging is recommended by his cardiologist anytime his INR drops below 2.5 for a significant amount of time.  Due to weather, patient (and his wife) prefer not to travel today to get lovenox as they feel that it would not be safe for them to do so.  Patient would prefer to take a higher dose of warfarin today instead to try to bring INR up quickly to help minimize clots.  He plans to come in tomorrow for an INR check as scheduled.  Patient will take warfarin 5 mg now and 7.5 mg again this evening (as he did so yesterday when discharged).  Advised patient that if he is having symptoms of a blood clot or stroke he needs to seek care immediately.  Patient and wife verbalized understanding of plan and risks.  Will monitor for clots.

## 2023-01-11 NOTE — TELEPHONE ENCOUNTER
Patient is not taking 12.5 mg daily, he has been taking his regular dosing.  He is calling back in to speak with nurse. Please use [phone] as their main phone number is not working currently.

## 2023-01-11 NOTE — TELEPHONE ENCOUNTER
Reason for Call:  Other returning call    Detailed comments: Pt calling back for INR, no # left in chart to connect to    Phone Number Patient can be reached at: Cell number on file:    Telephone Information:   Mobile [phone]       Best Time: any    Can we leave a detailed message on this number? NO VM    Call taken on 1/11/2023 at 9:37 AM by Maranda Blankenship

## 2023-01-12 ENCOUNTER — ANTICOAGULATION THERAPY VISIT (OUTPATIENT)
Dept: ANTICOAGULATION | Facility: CLINIC | Age: 83
End: 2023-01-12

## 2023-01-12 ENCOUNTER — LAB (OUTPATIENT)
Dept: LAB | Facility: CLINIC | Age: 83
End: 2023-01-12
Payer: MEDICARE

## 2023-01-12 ENCOUNTER — PATIENT OUTREACH (OUTPATIENT)
Dept: CARE COORDINATION | Facility: CLINIC | Age: 83
End: 2023-01-12

## 2023-01-12 ENCOUNTER — TELEPHONE (OUTPATIENT)
Dept: PEDIATRICS | Facility: CLINIC | Age: 83
End: 2023-01-12

## 2023-01-12 DIAGNOSIS — Z95.2 S/P MITRAL VALVE REPLACEMENT: ICD-10-CM

## 2023-01-12 DIAGNOSIS — Z95.2 S/P AORTIC VALVE REPLACEMENT: ICD-10-CM

## 2023-01-12 DIAGNOSIS — E11.9 DIABETES MELLITUS, TYPE 2 (H): Primary | ICD-10-CM

## 2023-01-12 DIAGNOSIS — Z79.01 LONG TERM CURRENT USE OF ANTICOAGULANT THERAPY: ICD-10-CM

## 2023-01-12 DIAGNOSIS — I48.19 PERSISTENT ATRIAL FIBRILLATION (H): ICD-10-CM

## 2023-01-12 DIAGNOSIS — Z95.2 S/P AORTIC VALVE REPLACEMENT: Primary | ICD-10-CM

## 2023-01-12 LAB — INR BLD: 3.9 (ref 0.9–1.1)

## 2023-01-12 PROCEDURE — 85610 PROTHROMBIN TIME: CPT

## 2023-01-12 PROCEDURE — 36416 COLLJ CAPILLARY BLOOD SPEC: CPT

## 2023-01-12 NOTE — PROGRESS NOTES
Connected Care Resource Center Contact  [facility]/Voicemail     Clinical Data: Transitional Care Management Outreach     Outreach attempted x 2.  Left message on patient's voicemail, providing [facility]'s 24/7 scheduling and nurse triage phone number [phone] ([phone]) for questions/concerns and/or to schedule an appt with an [facility] provider, if they do not have a PCP.      Plan:  [facility] will do no further outreaches at this time.       GILLIAN Issa  Connected Care Resource [place], [facility]    *Connected Care Resource Team does NOT follow patient ongoing. Referrals are identified based on internal discharge reports and the outreach is to ensure patient has an understanding of their discharge instructions.

## 2023-01-12 NOTE — TELEPHONE ENCOUNTER
Reason for Call:  Other returning call    Detailed comments: Pt called back regarding INR, no # left in chart by nurse.     Phone Number Patient can be reached at: Cell number on file:    Telephone Information:   Mobile [phone]       Best Time: any    Can we leave a detailed message on this number? NO    Call taken on 1/12/2023 at 1:28 PM by Maranda Blankenship

## 2023-01-12 NOTE — PROGRESS NOTES
ANTICOAGULATION MANAGEMENT     Fausto Farr 82 year old male is on warfarin with supratherapeutic INR result. (Goal INR 2.5-3.5)    Recent labs: (last 7 days)     01/12/23  1027   INR 3.9*       ASSESSMENT       Source(s): Chart Review and Patient/Caregiver Call       Warfarin doses taken: Warfarin taken as instructed.  Increased doses of warfarin given the last 4 days.    Diet: No new diet changes identified    New illness, injury, or hospitalization: Septic arthritis of his elbow possible gout.  Hospitalized 1/6-1/10/23.     Medication/supplement changes: Augmentin twice daily for 15 days (1/10-1/24/23).  Vitamin K 5 mg on 1/6.    Signs or symptoms of bleeding or clotting: No    Previous INR: Subtherapeutic    Additional findings: None       PLAN     Recommended plan for temporary change(s) affecting INR     Dosing Instructions: partial hold for 2 days then continue your current warfarin dose with next INR in 4 days       Summary  As of 1/12/2023    Full warfarin instructions:  1/12: 2.5 mg; 1/13: 5 mg; Otherwise 5 mg every Sun, Wed; 7.5 mg all other days   Next INR check:  1/16/2023             Telephone call with Ralph who agrees to plan and repeated back plan correctly    Lab visit scheduled    Education provided:     Please call back if any changes to your diet, medications or how you've been taking warfarin    Goal range and lab monitoring: goal range and significance of current result    Symptom monitoring: monitoring for bleeding signs and symptoms     Warfarin has potential of interaction with Augmentin which can increase INR.    Plan made with [facility] Pharmacist Hollie Sanders RN  Anticoagulation Clinic  1/12/2023    _______________________________________________________________________     Anticoagulation Episode Summary     Current INR goal:  2.5-3.5   TTR:  82.3 % (11.9 mo)   Target end date:  Indefinite   Send INR reminders to:  SHANNA DOWELL    Indications    S/P aortic valve  replacement [Z95.2]  S/P mitral valve replacement [Z95.2]  Long term current use of anticoagulant therapy [Z79.01]           Comments:  Per 9/20/22 Cardio Note strict INR goal of 2.5-3.5. If INR falls below 2.5 at any time, needs to be bridged with Lovenox. consent to leave DVM for medical information and scheduling         Anticoagulation Care Providers     Provider Role Specialty Phone number    Jodi Flower Mai, MD Referring Internal Medicine - Pediatrics [phone]

## 2023-01-12 NOTE — TELEPHONE ENCOUNTER
MTM referral from: Transitions of Care (recent hospital discharge or ED visit)    MTM referral outreach attempt #2 on January 12, 2023 at 8:22 AM      Outcome: Patient not reachable after several attempts, will route to [facility] Pharmacist/Provider as an FYI.  [facility] scheduling number is [phone].  Thank you for the referral.    Nani Tapia [facility]

## 2023-01-13 LAB
BACTERIA SNV CULT: ABNORMAL
BACTERIA SNV CULT: ABNORMAL

## 2023-01-14 NOTE — CONSULTS
[facility]    Orthopedics Consultation    Date of Admission:  1/6/2023    Assessment & Plan   Fausto Farr is a 82 year old male who was admitted on 1/6/2023 with painful, swollen, erythematous elbow with aspiration showing 87K nucleated cells, concern for right septic elbow.    Discussed both operative and nonoperative management.  Risks of surgery discussed included but not limited to bleeding, infection, damage to surrounding neurovascular structures, stiffness, need for revision surgery, blood clots, pulmonary embolus, stroke, and anesthetic complications.  No guarantees given or implied. Patient thoughtfully acknowledges risks and wishes to proceed I&D of elbow.        Jose A Christine MD, MD    Code Status    Prior    Reason for Consult   Reason for consult: I was asked by Dr. Contreras to evaluate this patient for septic elbow.    Primary Care Physician   Chris Flower    History of Present Illness   Fausto Farr is a 82 year old RHD male admitted on 1/6/2023 with septic right elbow. He fell in his driveway on 1/2/2023 and developed right shoulder and elbow pain.  X-rays negative.  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  Labs: white blood cells 13.8, C-reactive protein 178.71, and INR 1.5.  Arthrocentesis was performed and the fluid showed 87,610 nucleated cells, 91% of which were neutrophils.  Pt given cefepime and vancomycin.       MEDS:   Current Outpatient Medications   Medication Sig Dispense Refill     acetaminophen (TYLENOL) 325 MG tablet Take 2 tablets (650 mg) by mouth every 6 hours as needed for mild pain or other (and adjunct with moderate or severe pain or per patient request) 100 tablet 0     amoxicillin-clavulanate (AUGMENTIN) 875-125 MG tablet Take 1 tablet by mouth 2 times daily 30 tablet 0     betamethasone valerate (VALISONE) 0.1 % external lotion Apply topically 2 times daily Apply topically to scalp twice daily PRN 60 mL 3     cholecalciferol 25 MCG (1000 UT) TABS Take 1,000 Units by mouth daily       ezetimibe (ZETIA) 10 MG tablet Take 1 tablet (10 mg) by mouth daily 90 tablet 3     ferrous sulfate (FEROSUL) 325 (65 Fe) MG tablet Take 325 mg by mouth daily (with breakfast)       fluocinonide (LIDEX) 0.05 % external ointment Apply topically 2 times daily 60 g 11     glucosamine-chondroitin 500-400 MG CAPS per capsule Take 1 capsule by mouth every evening       mesalamine (ASACOL HD) 800 MG EC tablet Take 1 tablet (800 mg) by mouth 2 times daily 180 tablet 3     metoprolol succinate ER (TOPROL XL) 50 MG 24 hr tablet Take 1 1/2 tab (75mg) daily 135 tablet 1     ondansetron (ZOFRAN ODT) 4 MG ODT tab Take 1 tablet (4 mg) by mouth every 6 hours as needed for nausea or vomiting 5 tablet 0     oxyCODONE (ROXICODONE) 5 MG tablet Take 1 tablet (5 mg) by mouth every 4 hours as needed for severe pain (7-10) (IF pain not managed with non-pharmacological and non-opioid interventions) 15 tablet 0     polyethylene glycol (MIRALAX) 17 GM/Dose powder Take 17 g by mouth daily 510 g 0     rosuvastatin (CRESTOR) 40 MG tablet Take 1 tablet (40 mg) by mouth daily 90 tablet 3     senna-docusate (SENOKOT-S/PERICOLACE) 8.6-50 MG tablet Take 1 tablet by mouth 2 times daily as needed for constipation 14 tablet 0     tamsulosin (FLOMAX) 0.4 MG capsule Take 1 capsule (0.4 mg) by mouth daily 90 capsule 3     triamcinolone (KENALOG) 0.1 % external cream  Apply topically 2 times daily 85.2 g 1     warfarin ANTICOAGULANT (COUMADIN) 5 MG tablet Take 5mg every Sun & Wed / Take 7.5mg all other days OR per INR clinic 120 tablet 1       PAST MEDICAL HISTORY:   Past Medical History:   Diagnosis Date     Abdominal pain      Anemia     currently taking iron     Back pain     since 1980     BPH (benign prostatic hyperplasia)      Bruit      CAD (coronary artery disease)      Cellulitis 10/18/2012     Cellulitis 05/2018    GrpB strep LLE cellulitis  negative RACHAEL for veg     Chronic venous insufficiency     bilat lower extremities     Contact dermatitis and other eczema, due to unspecified cause      Diaphragmatic hernia without mention of obstruction or gangrene      Diastolic HF (heart failure) (H)      Gastric ulcer      Hypertension 08/06/2013     Lumbago      Mesenteric artery insufficiency (H)     known AAA and narrowing of intestinal artery's  followed by dr raymond     Metabolic syndrome      Mitral valve disease     s/p mechanical MVR, prior mech AVR     Nonallopathic lesion of lumbar region      Nonallopathic lesion of rib cage      Nonallopathic lesion of sacral region      GAGE (obstructive sleep apnea)     CPAP     Paroxysmal atrial fibrillation (H) 10/18/2012    occured after stopping BB  (prior  aflutter ablation)     Prostate cancer (H) 2008    radiation seed, XRT      PVD (peripheral vascular disease) (H)      Rotator cuff strain     and sprain     S/P AAA repair      S/P aortic valve replacement 2006    developed perivalve leak and MS, therefore redo surg 2013     S/P CABG (coronary artery bypass graft) 2006    Lima-Lad, RA-Rca     Sciatica of left side     since 2000     Sepsis due to group B Streptococcus (H) 05/19/2018     TIA (transient ischemic attack) 8/1/2022     Total knee replacement status 7/22/2019     Ulcerative colitis (H)      Varicose veins of bilateral lower extremities with other complications     s/p RLE vein stripping     Vitamin D deficiency         PAST SURGICAL HISTORY:   Past Surgical History:   Procedure Laterality Date     AORTIC VALVE REPLACEMENT  1/3/06    redo AVR SJM 21mm and SJM MVR 27mm in 2013SJM 21([id number]):AVR, SJM 27 :MVR-     APPLY WOUND VAC N/A 11/12/2019    Procedure: APPLICATION, WOUND VAC;  Surgeon: Sara Martinez MD;  Location:  OR     ARTHROPLASTY KNEE      right knee     ARTHROPLASTY KNEE Right 7/22/2019    Procedure: Right total knee arthroplasty;  Surgeon: Prasanth Jensen MD;  Location: [facility] OR     BACK SURGERY  Oct 2015    Fusion L4-5, laminectomy L2, L3     BYPASS GRAFT ARTERY CORONARY  10/2013    reimplantation of radial artery graft to RCA     CARDIAC CATHERIZATION  11/2005    Stent placed to RCA     CARDIAC CATHERIZATION  04/2013    Occluded RCA, patent LIMA to LAD and radial graft to PDA     CARPAL TUNNEL RELEASE RT/LT  1994     COLONOSCOPY  8-22-11     CYSTOSCOPY FLEXIBLE  10/16/2013    Procedure: CYSTOSCOPY FLEXIBLE;  FLEXIBLE CYSTOSCOPY / DILATION OF URETHRA / INSERTION OF LESLIE;  Surgeon: Cooper Wallace MD;  Location:  OR     ENDOVASCULAR REPAIR, INFRARENAL ABDOMINAL AORTIC ANEURYSM/DISSECTION; MODULAR BIFURCATED PROSTHESIS  2006    AAA repair endovascular     ENT SURGERY       EP ABLATION ATRIAL FLUTTER N/A 4/22/2019    Procedure: EP Ablation Atrial Flutter;  Surgeon: Jessy Vasquez MD;  Location:  HEART CARDIAC CATH LAB     EP PACEMAKER DEVICE & LEAD IMPLANT- RIGHT ATRIAL & RIGHT VENTRICULAR N/A 8/2/2022    Procedure: Pacemaker Device & Lead Implant - Right Atrial & Right Ventricular;  Surgeon: Jessy Vasquez MD;  Location:  HEART CARDIAC CATH LAB     GENITOURINARY SURGERY  6/16/08    radioactive seeding     GRAFT FLAP PEDICLE EXTREMITY (LOCATION) Right 11/12/2019    Procedure: SURGICAL PROCUREMENT, FLAP, PEDICLE, EXTREMITY;  Surgeon: Sara Martinez MD;  Location:  OR     GRAFT SKIN SPLIT THICKNESS FROM EXTREMITY Right 11/12/2019    Procedure: RIGHT  GASTRONEMIUS FLAP TO RIGHT KNEE, SPLIT THICKNESS SKIN GRAFT FROM RIGHT THIGH TO RIGHT KNEE, SURGICAL PROCUREMENT, FLAP, PEDICLE, EXTREMITY, WOUND VAC PLACEMENT;  Surgeon: Sara Martinez MD;  Location:  OR     HEAD & NECK SURGERY  1997    vocal cord polypectomy     INCISION AND DRAINAGE KNEE, COMBINED Right 8/29/2019    Procedure: INCISION AND DRAINAGE, KNEE W/ Secondary Wound Closure;  Surgeon: Prasanth Jensen MD;  Location:  OR     IRRIGATION AND DEBRIDEMENT KNEE, PLACE ANTIBIOTIC CEMENT BEADS / SPACE Right 2/12/2020    Procedure: 1. Irrigation and debridement of wound breakdown, right total knee arthroplasty.  2. Irrigation and debridement of right total knee with placement of antibiotic-impregnated (vancomycin) absorbable antibiotic beads.;  Surgeon: Prasanth Jensen MD;  Location: [facility] OR     IRRIGATION AND DEBRIDEMENT LOWER EXTREMITY, COMBINED Right 12/8/2019    Procedure: DEBRIDEMENT OF RIGHT CALF AND WOUND CLOSURE.;  Surgeon: Sara Martinez MD;  Location:  OR     IRRIGATION AND DEBRIDEMENT UPPER EXTREMITY, COMBINED Right 1/7/2023    Procedure: Right elbow arthrotomy, irrigation and debridement;  Surgeon: Jose A Christine MD;  Location:  OR     KNEE SURGERY  2001 Right knee arthroscopy     OPTICAL TRACKING SYSTEM FUSION SPINE POSTERIOR LUMBAR THREE+ LEVELS N/A 10/29/2015    Procedure: OPTICAL TRACKING SYSTEM FUSION SPINE POSTERIOR LUMBAR THREE+ LEVELS;  Surgeon: Walt Garcia MD;  Location:  OR     PROSTATE SURGERY      radioactive seeding 6/16/08     REPAIR ANEURYSM ABDOMINAL AORTA  06/08     REPAIR VALVE MITRAL  10/16/2013    SJM 21([id number]):AVR, SJM 27  501:MVRProcedure: REPAIR VALVE MITRAL;  REDO STERNOTOMY/REDO AORTIC VALVE REPLACEMENT/ MITRAL VALVE REPLACEMENT/REIMPLANTATION OF RIGHT CORONARY ARTERY BYPASS WITH RACHAEL ( ON PUMP);  Surgeon: Viet Singh MD;  Location:  OR     REPLACE VALVE AORTIC  10/16/2013    Procedure: REPLACE VALVE AORTIC;;  Surgeon:  Viet Singh MD;  Location:  OR     SURGERY GENERAL IP CONSULT  2008 Excision aneurysm abdominal aorta     SURGERY GENERAL IP CONSULT   Vocal cord polypectomy     VASCULAR SURGERY  ,      varicose vein stripping     ZZC CABG, VEIN, TWO  1/3/06    Left radial to RCA, LIMA to LAD (RA to RCA reimplanted at time of 2013 surg)       FAMILY HISTORY:   Family History   Problem Relation Age of Onset     No Known Problems Mother      Coronary Artery Disease Father         CABG     Heart Disease Father         Pacemaker     No Known Problems Brother      No Known Problems Sister      No Known Problems Son      Other Cancer Daughter      No Known Problems Daughter      Heart Disease Brother      Other - See Comments Grandchild        SOCIAL HISTORY:   Social History     Tobacco Use     Smoking status: Former     Packs/day: 1.00     Years: 40.00     Pack years: 40.00     Types: Cigarettes     Start date: 4/15/1962     Quit date: 10/23/2002     Years since quittin.2     Smokeless tobacco: Never   Substance Use Topics     Alcohol use: Yes     Comment: a couple beers per week (socially)       ALLERGIES:    Allergies   Allergen Reactions     Bees Anaphylaxis       ROS:  10 point ROS neg other than the symptoms noted above in the HPI.      Physical Exam                      Vital Signs with Ranges     214 lbs 4.8 oz    Constitutional: Pleasant, alert, appropriate, following commands.  HEENT: Head atraumatic normocephalic. Pupils equal round and reactive to light.  Respiratory: Unlabored breathing no audible wheeze  Cardiovascular: Regular rate and rhythm  GI: Abdomen soft nontender nondistended.  Lymph/Hematologic: No lymphadenopathy in areas examined  Genitourinary:  No small  Skin: No rashes, no cyanosis, no edema.  Musculoskeletal: Right elbow swollen, erythematous, severe pain with rom, NVI  Neurologic: normal without focal findings, mental status, speech normal, alert and oriented x iii, DAVIS,  reflexes normal and symmetric        Data   Most Recent 3 CBC's:Recent Labs   Lab Test 01/10/23  0737 01/09/23  0635 01/08/23  0726 01/07/23  0707   WBC 6.9 8.6  --  11.2*   HGB 12.2* 11.5* 11.0* 11.1*   MCV 93 93  --  95    311  --  238     Most Recent 3 BMP's:Recent Labs   Lab Test 01/10/23  0737 01/09/23  0635 01/08/23  0804 01/08/23  0726 01/07/23  1859 01/07/23  0707    138  --   --   --  138   POTASSIUM 4.0 4.0  --  3.9  --  3.8   CHLORIDE 101 102  --   --   --  101   CO2 29 27  --   --   --  25   BUN 14.0 16.0  --   --   --  14.7   CR 0.72 0.69  --   --   --  0.74   ANIONGAP 9 9  --   --   --  12   AWILDA 8.8 8.5*  --   --   --  8.7*   * 121* 137*  --    < > 110*    < > = values in this interval not displayed.     Most Recent 6 Bacteria Isolates From Any Culture (See EPIC Reports for Culture Details):Recent Labs   Lab Test 02/12/20  1033 02/12/20  1032 02/12/20  1029 02/10/20  1516 02/10/20  1510 02/03/20  1250   CULT Light growth  Bacteroides vulgatus  Susceptibility testing done on previous specimen  *  Light growth  Cutibacterium (Propionibacterium) acnes  Susceptibility testing not routinely done  *  Light growth  Finegoldia magna (Peptostreptococcus de)  Susceptibility testing not routinely done  *  Light growth  Haemophilus parainfluenzae  Susceptibility testing done on previous specimen  *  On day 2, isolated in broth only:  Staphylococcus aureus  Susceptibility testing done on previous specimen  * Moderate growth  Bacteroides vulgatus  *  Light growth  Finegoldia magna (Peptostreptococcus de)  *  Light growth  Haemophilus parainfluenzae  Susceptibility testing done on previous specimen  *  Critical Value/Significant Value, preliminary result only, called to and read back by  Liana Wolf RN from [facility] MS[facility]. 2/13/20 at 0958.[id number].   Light growth  Bacteroides vulgatus  Susceptibility testing done on previous specimen  *  Moderate growth  Finegoldia magna  (Peptostreptococcus de)  Susceptibility testing not routinely done  *  Light growth  Staphylococcus aureus  *  Light growth  Haemophilus parainfluenzae  *  Light growth  Staphylococcus lugdunensis  * No growth No growth Moderate growth  Peptoniphilus asaccharolyticus  Susceptibility testing not routinely done  *  Heavy growth  Staphylococcus aureus  *  Heavy growth  Streptococcus agalactiae sero group B  *  Heavy growth  Corynebacterium striatum  Identification obtained by MALDI-TOF mass spectrometry research use only database. Test   characteristics determined and verified by the [facility]   ([facility]) [place], [place].  Susceptibility testing not routinely done  *     Most Recent ESR & CRP:Recent Labs   Lab Test 01/09/23  0635 01/06/23  1541   SED  --  30*   CRP 70.84* 178.71*

## 2023-01-16 ENCOUNTER — ANTICOAGULATION THERAPY VISIT (OUTPATIENT)
Dept: ANTICOAGULATION | Facility: CLINIC | Age: 83
End: 2023-01-16

## 2023-01-16 ENCOUNTER — LAB (OUTPATIENT)
Dept: LAB | Facility: CLINIC | Age: 83
End: 2023-01-16
Payer: MEDICARE

## 2023-01-16 DIAGNOSIS — Z79.01 LONG TERM CURRENT USE OF ANTICOAGULANT THERAPY: ICD-10-CM

## 2023-01-16 DIAGNOSIS — Z95.2 S/P MITRAL VALVE REPLACEMENT: ICD-10-CM

## 2023-01-16 DIAGNOSIS — Z95.2 S/P AORTIC VALVE REPLACEMENT: ICD-10-CM

## 2023-01-16 DIAGNOSIS — Z95.2 S/P AORTIC VALVE REPLACEMENT: Primary | ICD-10-CM

## 2023-01-16 DIAGNOSIS — I48.19 PERSISTENT ATRIAL FIBRILLATION (H): ICD-10-CM

## 2023-01-16 LAB — INR BLD: 2.4 (ref 0.9–1.1)

## 2023-01-16 PROCEDURE — 36416 COLLJ CAPILLARY BLOOD SPEC: CPT

## 2023-01-16 PROCEDURE — 85610 PROTHROMBIN TIME: CPT

## 2023-01-16 NOTE — PROGRESS NOTES
ANTICOAGULATION MANAGEMENT     Fausto Farr 82 year old male is on warfarin with subtherapeutic INR result. (Goal INR 2.5-3.5)    Recent labs: (last 7 days)     01/16/23  1146   INR 2.4*       ASSESSMENT       Source(s): Chart Review and Patient/Caregiver Call       Warfarin doses taken: Warfarin taken as instructed    Diet: No new diet changes identified    New illness, injury, or hospitalization: No    Medication/supplement changes: None noted    Signs or symptoms of bleeding or clotting: No    Previous INR: Supratherapeutic    Additional findings: per chart, pt's cardiology wants him to bridge with Lovenox <2.5- but pt refuses this. I did inform him that its reccomended by his cardiologsist, but pt still declines and states he would rather take more Coumadin then do Lovenox. He is aware of the risk regarding this.        PLAN     Recommended plan for no diet, medication or health factor changes affecting INR     Dosing Instructions: 10 mg tonight  with next INR in 1 day       Summary  As of 1/16/2023    Full warfarin instructions:  1/16: 10 mg; Otherwise 5 mg every Sun, Wed; 7.5 mg all other days   Next INR check:  1/17/2023             Telephone call with Ralph who verbalizes understanding and agrees to plan    Lab visit scheduled    Education provided:     Please call back if any changes to your diet, medications or how you've been taking warfarin    Goal range and lab monitoring: goal range and significance of current result, Importance of therapeutic range and Importance of following up at instructed interval    Symptom monitoring: monitoring for clotting signs and symptoms, monitoring for stroke signs and symptoms and when to seek medical attention/emergency care    Plan made with [facility] Pharmacist Flaquita Contreras, RN  Anticoagulation Clinic  1/16/2023    _______________________________________________________________________     Anticoagulation Episode Summary     Current INR goal:   2.5-3.5   TTR:  81.9 % (11.9 mo)   Target end date:  Indefinite   Send INR reminders to:  SHANNA DELMER    Indications    S/P aortic valve replacement [Z95.2]  S/P mitral valve replacement [Z95.2]  Long term current use of anticoagulant therapy [Z79.01]           Comments:  Per 9/20/22 Cardio Note strict INR goal of 2.5-3.5. If INR falls below 2.5 at any time, needs to be bridged with Lovenox. consent to leave DVM for medical information and scheduling         Anticoagulation Care Providers     Provider Role Specialty Phone number    Jodi Flower Mai, MD Referring Internal Medicine - Pediatrics [phone]

## 2023-01-17 ENCOUNTER — LAB (OUTPATIENT)
Dept: LAB | Facility: CLINIC | Age: 83
End: 2023-01-17
Payer: MEDICARE

## 2023-01-17 ENCOUNTER — OFFICE VISIT (OUTPATIENT)
Dept: PEDIATRICS | Facility: CLINIC | Age: 83
End: 2023-01-17
Payer: MEDICARE

## 2023-01-17 ENCOUNTER — ANTICOAGULATION THERAPY VISIT (OUTPATIENT)
Dept: ANTICOAGULATION | Facility: CLINIC | Age: 83
End: 2023-01-17

## 2023-01-17 VITALS
TEMPERATURE: 98.1 F | HEART RATE: 60 BPM | OXYGEN SATURATION: 100 % | DIASTOLIC BLOOD PRESSURE: 70 MMHG | BODY MASS INDEX: 34.17 KG/M2 | RESPIRATION RATE: 16 BRPM | SYSTOLIC BLOOD PRESSURE: 120 MMHG | WEIGHT: 211.7 LBS

## 2023-01-17 DIAGNOSIS — Z95.2 S/P AORTIC VALVE REPLACEMENT: Primary | ICD-10-CM

## 2023-01-17 DIAGNOSIS — I10 ESSENTIAL HYPERTENSION, BENIGN: ICD-10-CM

## 2023-01-17 DIAGNOSIS — E11.9 DIABETES MELLITUS, TYPE 2 (H): Primary | ICD-10-CM

## 2023-01-17 DIAGNOSIS — Z95.2 S/P AORTIC VALVE REPLACEMENT: ICD-10-CM

## 2023-01-17 DIAGNOSIS — I48.19 PERSISTENT ATRIAL FIBRILLATION (H): ICD-10-CM

## 2023-01-17 DIAGNOSIS — Z95.2 S/P MITRAL VALVE REPLACEMENT: ICD-10-CM

## 2023-01-17 DIAGNOSIS — M00.9 PYOGENIC ARTHRITIS OF RIGHT ELBOW, DUE TO UNSPECIFIED ORGANISM (H): Primary | ICD-10-CM

## 2023-01-17 DIAGNOSIS — E11.9 TYPE 2 DIABETES MELLITUS WITHOUT COMPLICATION, WITHOUT LONG-TERM CURRENT USE OF INSULIN (H): ICD-10-CM

## 2023-01-17 DIAGNOSIS — Z79.01 LONG TERM CURRENT USE OF ANTICOAGULANT THERAPY: ICD-10-CM

## 2023-01-17 LAB
CREAT UR-MCNC: 163 MG/DL
CRP SERPL-MCNC: 7.11 MG/L
ERYTHROCYTE [DISTWIDTH] IN BLOOD BY AUTOMATED COUNT: 12.9 % (ref 10–15)
HBA1C MFR BLD: 6.8 % (ref 0–5.6)
HCT VFR BLD AUTO: 41.6 % (ref 40–53)
HGB BLD-MCNC: 13.2 G/DL (ref 13.3–17.7)
INR BLD: 3.5 (ref 0.9–1.1)
MCH RBC QN AUTO: 29 PG (ref 26.5–33)
MCHC RBC AUTO-ENTMCNC: 31.7 G/DL (ref 31.5–36.5)
MCV RBC AUTO: 91 FL (ref 78–100)
MICROALBUMIN UR-MCNC: 34.3 MG/L
MICROALBUMIN/CREAT UR: 21.04 MG/G CR (ref 0–17)
PLATELET # BLD AUTO: 376 10E3/UL (ref 150–450)
RBC # BLD AUTO: 4.55 10E6/UL (ref 4.4–5.9)
WBC # BLD AUTO: 8.8 10E3/UL (ref 4–11)

## 2023-01-17 PROCEDURE — 85027 COMPLETE CBC AUTOMATED: CPT | Performed by: PEDIATRICS

## 2023-01-17 PROCEDURE — 99496 TRANSJ CARE MGMT HIGH F2F 7D: CPT | Performed by: PEDIATRICS

## 2023-01-17 PROCEDURE — 82043 UR ALBUMIN QUANTITATIVE: CPT | Performed by: PEDIATRICS

## 2023-01-17 PROCEDURE — 36416 COLLJ CAPILLARY BLOOD SPEC: CPT

## 2023-01-17 PROCEDURE — 86140 C-REACTIVE PROTEIN: CPT | Performed by: PEDIATRICS

## 2023-01-17 PROCEDURE — 85610 PROTHROMBIN TIME: CPT

## 2023-01-17 PROCEDURE — 83036 HEMOGLOBIN GLYCOSYLATED A1C: CPT | Performed by: PEDIATRICS

## 2023-01-17 PROCEDURE — 36415 COLL VENOUS BLD VENIPUNCTURE: CPT | Performed by: PEDIATRICS

## 2023-01-17 PROCEDURE — 82570 ASSAY OF URINE CREATININE: CPT | Performed by: PEDIATRICS

## 2023-01-17 NOTE — PROGRESS NOTES
ANTICOAGULATION MANAGEMENT     Fausto Farr 82 year old male is on warfarin with therapeutic INR result. (Goal INR 2.5-3.5)    Recent labs: (last 7 days)     01/17/23  0849   INR 3.5*       ASSESSMENT       Source(s): Chart Review and Patient/Caregiver Call       Warfarin doses taken: Warfarin taken as instructed - confirmed booster dose as advised yesterday    Diet: No new diet changes identified    New illness, injury, or hospitalization: No - had office visit today. Notes are incomplete. Per Ralph, no updates or changes to report.    Medication/supplement changes: Still continues on augmentin course until 1/24. Per Uptodate, may enhance anticoagulant effect    Signs or symptoms of bleeding or clotting: No    Previous INR: Subtherapeutic    Additional findings: None       PLAN     Recommended plan for temporary change(s) affecting INR     Dosing Instructions: Continue your current warfarin dose with next INR in 3 days       Summary  As of 1/17/2023    Full warfarin instructions:  5 mg every Sun, Wed; 7.5 mg all other days   Next INR check:  1/20/2023             Telephone call with Ralph who verbalizes understanding and agrees to plan    Lab visit scheduled    Education provided:     Please call back if any changes to your diet, medications or how you've been taking warfarin    Plan made per [facility] anticoagulation protocol    Maria Luz Rios RN  Anticoagulation Clinic  1/17/2023    _______________________________________________________________________     Anticoagulation Episode Summary     Current INR goal:  2.5-3.5   TTR:  81.9 % (11.9 mo)   Target end date:  Indefinite   Send INR reminders to:  SHANNA DOWELL    Indications    S/P aortic valve replacement [Z95.2]  S/P mitral valve replacement [Z95.2]  Long term current use of anticoagulant therapy [Z79.01]           Comments:  Per 9/20/22 Cardio Note strict INR goal of 2.5-3.5. If INR falls below 2.5 at any time, needs to be bridged with Lovenox.  consent to leave DVM for medical information and scheduling         Anticoagulation Care Providers     Provider Role Specialty Phone number    Jodi Flower Mai, MD Referring Internal Medicine - Pediatrics [phone]

## 2023-01-17 NOTE — PROGRESS NOTES
Assessment & Plan       ICD-10-CM    1. Pyogenic arthritis of right elbow, due to unspecified organism (H)  M00.9 CBC with platelets     CRP, inflammation    Doing well, continue augmentin course as planned, follow up with orthopedics, repeat labs today.    Return to clinic with any concerns      2. Essential hypertension, benign  I10 Well controlled, continue current medications        3. Type 2 diabetes mellitus without complication, without long-term current use of insulin (H)  E11.9 Hemoglobin A1c     Albumin Random Urine Quantitative with Creat Ratio    Repeat A1C today, well controlled historically           4. S/P aortic valve replacement  Z95.2 On warfarin - working diligently with the anticoagulation team - goal INR 2.5-3.5               MED REC REQUIRED  Post Medication Reconciliation Status:       See Patient Instructions    No follow-ups on file.    Yolis Michel MD  [facility] DELMER Rosas is a 82 year old accompanied by his spouse, presenting for the following health issues:  [facility] F/U      \[facility]\""   Ralph is an 82 year old left-handed male who presents to clinic 1 week after discharge from the hospital where he was treated for R elbow effusion (psuedogout) and R elbow cellulitis s/p [facility] orthopedics after a fall on his driveway. He has been feeling well since discharge, with no fever or R elbow swelling. The R arm cast was removed yesterday, and the right arm feels stiff, but he notes he is able to touch his mouth. Ralph has been following instructions to not lift anything over 5 pounds, but he does feel that the strength in the arm is okay; he did shovel (pushed only) yesterday and his arm felt sore afterwards. He is only taking one pain pill per day, which he takes in the evening. Ralph's wife helped with re-bandaging his incision after the cast came off. Ralph has not had any gout-like flares before.     Ralph is working with the INR team to adjust his warfarin  to get his INR back in the optimal 2.5-3.5 range; today his INR was 3.5. PCD's were used in the hospital, and he hasn't noticed any pain in his legs, or shortness of breath.      Ralph takes prophylactic Augmentin following a R knee infection s/p total knee arthroplasty in 2019. He is following instructions to take a double dose daily for the next month (1 pill BID) to treat the R elbow infection. He says that the doctors at the hospital thought that his prophylactic Augmentin interfered with culture growth.     Ralph is following up with orthopedics on Thursday to get stitches out, and plans to discuss the ongoing shoulder and back pain from the fall.    Hospital Follow-up Visit:    Hospital/Nursing Home/IP Rehab Facility: [facility]  Date of Admission: 01/06/2023  Date of Discharge: 01/10/2023  Reason(s) for Admission: Gout flair up accompanied by a fall.     Was your hospitalization related to COVID-19? No   Problems taking medications regularly:  None  Medication changes since discharge: Zofran for nausea, oxycodone and tylenol as needed for pain, senna-docusate for constipation (as needed) and daily miralax.   Problems adhering to non-medication therapy:  None    Summary of hospitalization:  [facility] discharge summary reviewed  Diagnostic Tests/Treatments reviewed.  Follow up needed: CBC, CRP today  Other Healthcare Providers Involved in Patient s Care:         Specialist appointment - orthopedics later this week  Update since discharge: improved.   Plan of care communicated with patient and family member: CBC, CRP today, as well as A1C and urine protein to prepare for next PCP visit. Patient was counseled on symptoms of gout/psuedogout flares and instructed to let PCP know if any flares occur.              Objective    /70 (BP Location: Right arm, Patient Position: Sitting, Cuff Size: Adult Regular)   Pulse 60   Temp 98.1  F (36.7  C) (Tympanic)   Resp 16   Wt  96 kg (211 lb 11.2 oz)   SpO2 100%   BMI 34.17 kg/m    Body mass index is 34.17 kg/m .  Physical Exam   GENERAL: healthy, alert and no distress  RESP: lungs clear to auscultation - no rales, rhonchi or wheezes  CV: regular rate and rhythm, mechanical heart sounds, S1 S2, no mrumur  SKIN: R elbow incision is bandaged. Incision- clean incision closed with sutures, no drainage, blood, swelling, or erythema.    Results for orders placed or performed in visit on 01/17/23 (from the past 24 hour(s))   CBC with platelets   Result Value Ref Range    WBC Count 8.8 4.0 - 11.0 10e3/uL    RBC Count 4.55 4.40 - 5.90 10e6/uL    Hemoglobin 13.2 (L) 13.3 - 17.7 g/dL    Hematocrit 41.6 40.0 - 53.0 %    MCV 91 78 - 100 fL    MCH 29.0 26.5 - 33.0 pg    MCHC 31.7 31.5 - 36.5 g/dL    RDW 12.9 10.0 - 15.0 %    Platelet Count 376 150 - 450 10e3/uL   Hemoglobin A1c   Result Value Ref Range    Hemoglobin A1C 6.8 (H) 0.0 - 5.6 %     *Note: Due to a large number of results and/or encounters for the requested time period, some results have not been displayed. A complete set of results can be found in Results Review.     CRP pending    I have seen and examined the patient, discussed with the resident and agree with the history, physical and plan as documented above.    Yolis Michel MD  Internal Medicine - Pediatrics

## 2023-01-19 ENCOUNTER — TRANSFERRED RECORDS (OUTPATIENT)
Dept: PEDIATRICS | Facility: CLINIC | Age: 83
End: 2023-01-19

## 2023-01-20 ENCOUNTER — ANTICOAGULATION THERAPY VISIT (OUTPATIENT)
Dept: ANTICOAGULATION | Facility: CLINIC | Age: 83
End: 2023-01-20

## 2023-01-20 ENCOUNTER — LAB (OUTPATIENT)
Dept: LAB | Facility: CLINIC | Age: 83
End: 2023-01-20
Payer: MEDICARE

## 2023-01-20 DIAGNOSIS — Z95.2 S/P AORTIC VALVE REPLACEMENT: Primary | ICD-10-CM

## 2023-01-20 DIAGNOSIS — Z95.2 S/P AORTIC VALVE REPLACEMENT: ICD-10-CM

## 2023-01-20 DIAGNOSIS — Z79.01 LONG TERM CURRENT USE OF ANTICOAGULANT THERAPY: ICD-10-CM

## 2023-01-20 DIAGNOSIS — I48.19 PERSISTENT ATRIAL FIBRILLATION (H): ICD-10-CM

## 2023-01-20 DIAGNOSIS — Z95.2 S/P MITRAL VALVE REPLACEMENT: ICD-10-CM

## 2023-01-20 LAB — INR BLD: 3.8 (ref 0.9–1.1)

## 2023-01-20 PROCEDURE — 85610 PROTHROMBIN TIME: CPT

## 2023-01-20 PROCEDURE — 36416 COLLJ CAPILLARY BLOOD SPEC: CPT

## 2023-01-20 NOTE — PROGRESS NOTES
ANTICOAGULATION MANAGEMENT     Fausto Farr 82 year old male is on warfarin with supratherapeutic INR result. (Goal INR 2.5-3.5)    Recent labs: (last 7 days)     01/20/23  0928   INR 3.8*       ASSESSMENT       Source(s): Chart Review and Patient/Caregiver Call       Warfarin doses taken: Booster dose(s) recently taken which may be affecting INR (1/16 booster dose)    Diet: No new diet changes identified - hasn't had much of an appetite lately. Doesn't believe his Vitamin K intake is impacted by this though    New illness, injury, or hospitalization: No    Medication/supplement changes: Stopped taking oxycodone. No interaction per Uptodate. Now reports he will be taking more tylenol to manage his pain. Starting yesterday, taking 4 tylenol daily (2 am, 2pm). Unsure if these are extra strength or not. Will inform [facility] at next visit. Ralph would like to eventually get this down to 2 tylenol daily (ideally at night so he can rest more comfortably)    Signs or symptoms of bleeding or clotting: No    Previous INR: Therapeutic last visit; previously outside of goal range    Additional findings: None       PLAN     Recommended plan for temporary change(s) and ongoing change(s) affecting INR     Dosing Instructions: decrease your warfarin dose (5.3% change) with next INR in 2 weeks       Summary  As of 1/20/2023    Full warfarin instructions:  5 mg every Mon, Wed, Fri; 7.5 mg all other days   Next INR check:  2/1/2023             Telephone call with Ralph who verbalizes understanding and agrees to plan    Lab visit scheduled    Education provided:     Please call back if any changes to your diet, medications or how you've been taking warfarin    Impact of increased tylenol usage on INR    Plan made per [facility] anticoagulation protocol    Maria Luz Rios RN  Anticoagulation Clinic  1/20/2023    _______________________________________________________________________     Anticoagulation Episode Summary     Current  INR goal:  2.5-3.5   TTR:  81.6 % (11.9 mo)   Target end date:  Indefinite   Send INR reminders to:  SHANNA DOWELL    Indications    S/P aortic valve replacement [Z95.2]  S/P mitral valve replacement [Z95.2]  Long term current use of anticoagulant therapy [Z79.01]           Comments:  Per 9/20/22 Cardio Note strict INR goal of 2.5-3.5. If INR falls below 2.5 at any time, needs to be bridged with Lovenox. consent to leave DVM for medical information and scheduling         Anticoagulation Care Providers     Provider Role Specialty Phone number    Jodi Flower Mai, MD Referring Internal Medicine - Pediatrics [phone]

## 2023-01-21 LAB — BACTERIA SNV CULT: NORMAL

## 2023-01-31 ENCOUNTER — TELEPHONE (OUTPATIENT)
Dept: SLEEP MEDICINE | Facility: CLINIC | Age: 83
End: 2023-01-31

## 2023-01-31 DIAGNOSIS — G47.33 OBSTRUCTIVE SLEEP APNEA (ADULT) (PEDIATRIC): Primary | ICD-10-CM

## 2023-01-31 NOTE — TELEPHONE ENCOUNTER
General Call    Contacts       Type Contact Phone/Fax    01/31/2023 02:53 PM CST Phone (Incoming) Ralph Farr (Self) [phone] (H)        Reason for Call:  Patient is requested an updated CPAP prescription. Patient states that Home [facility] advised patient to request an update CPAP prescription.  Patient states he has had CPAP machine for five years now.         Date of last appointment with provider: 01/15/2021    Could we send this information to you in [place] or would you prefer to receive a phone call?:   No preference   Okay to leave a detailed message?: Yes at Home number on file [phone] (home)      Thanks!     Beulah Vega  Central Scheduler

## 2023-02-01 ENCOUNTER — LAB (OUTPATIENT)
Dept: LAB | Facility: CLINIC | Age: 83
End: 2023-02-01
Payer: MEDICARE

## 2023-02-01 ENCOUNTER — ANTICOAGULATION THERAPY VISIT (OUTPATIENT)
Dept: ANTICOAGULATION | Facility: CLINIC | Age: 83
End: 2023-02-01

## 2023-02-01 DIAGNOSIS — Z95.2 S/P MITRAL VALVE REPLACEMENT: ICD-10-CM

## 2023-02-01 DIAGNOSIS — I48.19 PERSISTENT ATRIAL FIBRILLATION (H): ICD-10-CM

## 2023-02-01 DIAGNOSIS — Z95.2 S/P AORTIC VALVE REPLACEMENT: Primary | ICD-10-CM

## 2023-02-01 DIAGNOSIS — Z79.01 LONG TERM CURRENT USE OF ANTICOAGULANT THERAPY: ICD-10-CM

## 2023-02-01 DIAGNOSIS — Z95.2 S/P AORTIC VALVE REPLACEMENT: ICD-10-CM

## 2023-02-01 DIAGNOSIS — E11.9 DIABETES MELLITUS, TYPE 2 (H): ICD-10-CM

## 2023-02-01 LAB
CREAT UR-MCNC: 132 MG/DL
HBA1C MFR BLD: 7.1 % (ref 0–5.6)
INR BLD: 3.7 (ref 0.9–1.1)
MICROALBUMIN UR-MCNC: 30.9 MG/L
MICROALBUMIN/CREAT UR: 23.41 MG/G CR (ref 0–17)

## 2023-02-01 PROCEDURE — 82570 ASSAY OF URINE CREATININE: CPT

## 2023-02-01 PROCEDURE — 83036 HEMOGLOBIN GLYCOSYLATED A1C: CPT

## 2023-02-01 PROCEDURE — 36415 COLL VENOUS BLD VENIPUNCTURE: CPT

## 2023-02-01 PROCEDURE — 85610 PROTHROMBIN TIME: CPT

## 2023-02-01 PROCEDURE — 82043 UR ALBUMIN QUANTITATIVE: CPT

## 2023-02-01 NOTE — PROGRESS NOTES
ANTICOAGULATION MANAGEMENT     Fausto Farr 82 year old male is on warfarin with supratherapeutic INR result. (Goal INR 2.5-3.5)    Recent labs: (last 7 days)     02/01/23  1013   INR 3.7*       ASSESSMENT       Source(s): Chart Review and Patient/Caregiver Call       Warfarin doses taken: Warfarin taken as instructed    Diet: No new diet changes identified    New illness, injury, or hospitalization: Yes: slipped and fell 1/5, had elbow drained due to infection    Medication/supplement changes: still taking augmentin     Signs or symptoms of bleeding or clotting: No    Previous INR: Supratherapeutic    Additional findings: None       PLAN     Recommended plan for no diet, medication or health factor changes affecting INR     Dosing Instructions: decrease your warfarin dose (5.6% change) with next INR in 2 weeks       Summary  As of 2/1/2023    Full warfarin instructions:  7.5 mg every Sun, Tue, Thu; 5 mg all other days   Next INR check:  2/15/2023             Telephone call with Ralph who verbalizes understanding and agrees to plan    Lab visit scheduled    Education provided:     Goal range and lab monitoring: goal range and significance of current result    Plan made per [facility] anticoagulation protocol    Kathy Gaffney RN  Anticoagulation Clinic  2/1/2023    _______________________________________________________________________     Anticoagulation Episode Summary     Current INR goal:  2.5-3.5   TTR:  78.6 % (11.9 mo)   Target end date:  Indefinite   Send INR reminders to:  SHANNA DOWELL    Indications    S/P aortic valve replacement [Z95.2]  S/P mitral valve replacement [Z95.2]  Long term current use of anticoagulant therapy [Z79.01]           Comments:  Per 9/20/22 Cardio Note strict INR goal of 2.5-3.5. If INR falls below 2.5 at any time, needs to be bridged with Lovenox. consent to leave DVM for medical information and scheduling         Anticoagulation Care Providers     Provider Role Specialty Phone  number    Jodi Flower Mai, MD Referring Internal Medicine - Pediatrics [phone]

## 2023-02-01 NOTE — PROGRESS NOTES
ANTICOAGULATION MANAGEMENT     Fausto Farr 82 year old male is on warfarin with supratherapeutic INR result. (Goal INR 2.5-3.5)    Recent labs: (last 7 days)     02/01/23  1013   INR 3.7*       ASSESSMENT       Source(s): Chart Review    Previous INR was Supratherapeutic    Medication, diet, health changes since last INR chart reviewed; none identified           PLAN     Unable to reach Ralph today.    LMTCB    Follow up required to confirm warfarin dose taken and assess for changes    Kathy Gaffney RN  Anticoagulation Clinic  2/1/2023

## 2023-02-01 NOTE — CONFIDENTIAL NOTE
82-year-old male with moderate obstructive sleep apnea with hypoxemia in the setting of diabetes, chronic systolic heart failure and atrial fibrillation.  Patient has been compliant with therapy with AHI of less than 5 but needs follow-up clinic visit this year.

## 2023-02-03 ENCOUNTER — MYC MEDICAL ADVICE (OUTPATIENT)
Dept: SLEEP MEDICINE | Facility: CLINIC | Age: 83
End: 2023-02-03
Payer: MEDICARE

## 2023-02-03 NOTE — RESULT ENCOUNTER NOTE
Dear Ralph,    Your lab results are stable (your A1C is slightly higher, but not too bad).  We can discuss these further at your upcoming appointment.    Please feel free to call with any questions.  Otherwise, we can discuss further at your next appointment.    Sincerely,    Chris Flower MD

## 2023-02-03 NOTE — PROGRESS NOTES
Dr Dobbins placed order for cpap supplies,  Ralph LOV 1/2021.  Dr. Dobbins would like Ralph scheduled for in person return visit this year 2023.    LVM with Ralph asking him to call direct number or central scheduling to schedule an in person visit for cpap review.

## 2023-02-15 ENCOUNTER — ANTICOAGULATION THERAPY VISIT (OUTPATIENT)
Dept: ANTICOAGULATION | Facility: CLINIC | Age: 83
End: 2023-02-15

## 2023-02-15 ENCOUNTER — LAB (OUTPATIENT)
Dept: LAB | Facility: CLINIC | Age: 83
End: 2023-02-15
Payer: MEDICARE

## 2023-02-15 DIAGNOSIS — Z79.01 LONG TERM CURRENT USE OF ANTICOAGULANT THERAPY: ICD-10-CM

## 2023-02-15 DIAGNOSIS — E11.9 DIABETES MELLITUS, TYPE 2 (H): Primary | ICD-10-CM

## 2023-02-15 DIAGNOSIS — Z95.2 S/P MITRAL VALVE REPLACEMENT: ICD-10-CM

## 2023-02-15 DIAGNOSIS — Z95.2 S/P AORTIC VALVE REPLACEMENT: ICD-10-CM

## 2023-02-15 DIAGNOSIS — Z95.2 S/P AORTIC VALVE REPLACEMENT: Primary | ICD-10-CM

## 2023-02-15 DIAGNOSIS — I48.19 PERSISTENT ATRIAL FIBRILLATION (H): ICD-10-CM

## 2023-02-15 LAB — INR BLD: 3.3 (ref 0.9–1.1)

## 2023-02-15 PROCEDURE — 36416 COLLJ CAPILLARY BLOOD SPEC: CPT

## 2023-02-15 PROCEDURE — 85610 PROTHROMBIN TIME: CPT

## 2023-02-15 NOTE — PROGRESS NOTES
ANTICOAGULATION MANAGEMENT     Fausto Farr 82 year old male is on warfarin with therapeutic INR result. (Goal INR 2.5-3.5)    Recent labs: (last 7 days)     02/15/23  0940   INR 3.3*       ASSESSMENT       Source(s): Chart Review and Patient/Caregiver Call       Warfarin doses taken: More warfarin taken than planned which may be affecting INR    Diet: No new diet changes identified    New illness, injury, or hospitalization: No    Medication/supplement changes: patient takes Augmentin chronically; however, he took twice daily recently due to elbow infection, he states he is back down to 1 tablet daily.    Signs or symptoms of bleeding or clotting: No    Previous INR: Supratherapeutic    Additional findings: warfarin maintenance dose was reduced at last 2 INR visits; however, patient has been taking more warfarin and INR is in range so I changed dosing to reflect how patient has been taking it.       PLAN     Recommended plan for temporary change(s) affecting INR     Dosing Instructions: Increase your warfarin dose (6% change) with next INR in 2 weeks; however patient elected for 3 weeks.       Summary  As of 2/15/2023    Full warfarin instructions:  5 mg every Mon, Wed, Fri; 7.5 mg all other days   Next INR check:  3/8/2023             Telephone call with Ralph who agrees to plan and repeated back plan correctly    Lab visit scheduled    Education provided:     Taking warfarin: importance of following [facility] instructions vs instructions on the prescription bottle and Importance of taking warfarin as instructed    Plan made per [facility] anticoagulation protocol    Aure Sampson, RN  Anticoagulation Clinic  2/15/2023    _______________________________________________________________________     Anticoagulation Episode Summary     Current INR goal:  2.5-3.5   TTR:  76.5 % (11.9 mo)   Target end date:  Indefinite   Send INR reminders to:  SHANNA DOWELL    Indications    S/P aortic valve replacement [Z95.2]  S/P mitral  valve replacement [Z95.2]  Long term current use of anticoagulant therapy [Z79.01]           Comments:  Per 9/20/22 Cardio Note strict INR goal of 2.5-3.5. If INR falls below 2.5 at any time, needs to be bridged with Lovenox. consent to leave DVM for medical information and scheduling         Anticoagulation Care Providers     Provider Role Specialty Phone number    Jodi Flower Mai, MD Referring Internal Medicine - Pediatrics [phone]

## 2023-03-08 ENCOUNTER — LAB (OUTPATIENT)
Dept: LAB | Facility: CLINIC | Age: 83
End: 2023-03-08
Payer: MEDICARE

## 2023-03-08 ENCOUNTER — ANTICOAGULATION THERAPY VISIT (OUTPATIENT)
Dept: ANTICOAGULATION | Facility: CLINIC | Age: 83
End: 2023-03-08

## 2023-03-08 ENCOUNTER — DOCUMENTATION ONLY (OUTPATIENT)
Dept: ANTICOAGULATION | Facility: CLINIC | Age: 83
End: 2023-03-08

## 2023-03-08 DIAGNOSIS — Z79.01 LONG TERM CURRENT USE OF ANTICOAGULANT THERAPY: ICD-10-CM

## 2023-03-08 DIAGNOSIS — Z95.2 S/P MITRAL VALVE REPLACEMENT: ICD-10-CM

## 2023-03-08 DIAGNOSIS — Z95.2 S/P AORTIC VALVE REPLACEMENT: ICD-10-CM

## 2023-03-08 DIAGNOSIS — Z95.2 S/P AORTIC VALVE REPLACEMENT: Primary | ICD-10-CM

## 2023-03-08 DIAGNOSIS — I48.11 LONGSTANDING PERSISTENT ATRIAL FIBRILLATION (H): Primary | ICD-10-CM

## 2023-03-08 DIAGNOSIS — I48.19 PERSISTENT ATRIAL FIBRILLATION (H): ICD-10-CM

## 2023-03-08 LAB — INR BLD: 2.8 (ref 0.9–1.1)

## 2023-03-08 PROCEDURE — 85610 PROTHROMBIN TIME: CPT

## 2023-03-08 PROCEDURE — 36416 COLLJ CAPILLARY BLOOD SPEC: CPT

## 2023-03-08 NOTE — PROGRESS NOTES
ANTICOAGULATION MANAGEMENT     Fausto Farr 82 year old male is on warfarin with therapeutic INR result. (Goal INR 2.5-3.5)    Recent labs: (last 7 days)     03/08/23  0829   INR 2.8*       ASSESSMENT       Source(s): Chart Review    Previous INR was Therapeutic last visit; previously outside of goal range    Medication, diet, health changes since last INR chart reviewed; none identified     Patient's weekly routine was updated at last INR check.               PLAN     Unable to reach Ralph today.      Follow up required to confirm warfarin dose taken and assess for changes.  Would like to confirm with patient that he continues to take warfarin as discussed at last INR check.    Ruthann Sanders RN  Anticoagulation Clinic  3/8/2023

## 2023-03-08 NOTE — PROGRESS NOTES
ANTICOAGULATION CLINIC REFERRAL RENEWAL REQUEST       An annual renewal order is required for all patients referred to [facility].?  Please review and sign the pended referral order for Fausto Alli Farr.       ANTICOAGULATION SUMMARY      Warfarin indication(s)   Mechanical AVR and Mechanical MVR    Mechanical heart valve present?  Mechanical  AVR-Bileaflet and Mechanical MVR       Current goal range   INR: 2.5-3.5     Goal appropriate for indication? Goal INR 2.5-3.5 standard for indication(s) above     Time in Therapeutic Range (TTR)  (Goal > 60%) 76.5%       Office visit with referring provider's group within last year yes on 1/17/23       Ruthann Sanders RN  [facility]

## 2023-03-08 NOTE — PROGRESS NOTES
ANTICOAGULATION MANAGEMENT     Fausto Farr 82 year old male is on warfarin with therapeutic INR result. (Goal INR 2.5-3.5)    Recent labs: (last 7 days)     03/08/23  0829   INR 2.8*       ASSESSMENT       Source(s): Chart Review and Patient/Caregiver Call       Warfarin doses taken: Warfarin taken as instructed    Diet: No new diet changes identified    New illness, injury, or hospitalization: No    Medication/supplement changes: None noted    Signs or symptoms of bleeding or clotting: No    Previous INR: Therapeutic last visit; previously outside of goal range    Additional findings: None         PLAN     Recommended plan for no diet, medication or health factor changes affecting INR     Dosing Instructions: Continue your current warfarin dose with next INR in 4 weeks       Summary  As of 3/8/2023    Full warfarin instructions:  5 mg every Mon, Wed, Fri; 7.5 mg all other days   Next INR check:  4/5/2023             Telephone call with Ralph who agrees to plan and repeated back plan correctly    Lab visit scheduled    Education provided:     Please call back if any changes to your diet, medications or how you've been taking warfarin    Plan made per [facility] anticoagulation protocol    Ruthann Sanders RN  Anticoagulation Clinic  3/8/2023    _______________________________________________________________________     Anticoagulation Episode Summary     Current INR goal:  2.5-3.5   TTR:  76.5 % (11.9 mo)   Target end date:  Indefinite   Send INR reminders to:  SHANNA DOWELL    Indications    S/P aortic valve replacement [Z95.2]  S/P mitral valve replacement [Z95.2]  Long term current use of anticoagulant therapy [Z79.01]           Comments:  Per 9/20/22 Cardio Note strict INR goal of 2.5-3.5. If INR falls below 2.5 at any time, needs to be bridged with Lovenox. consent to leave DVM for medical information and scheduling         Anticoagulation Care Providers     Provider Role Specialty Phone number    Jodi Flower  MD Lucie Referring Internal Medicine - Pediatrics [phone]

## 2023-03-15 PROBLEM — I48.11 LONGSTANDING PERSISTENT ATRIAL FIBRILLATION (H): Status: ACTIVE | Noted: 2023-03-15

## 2023-03-31 ENCOUNTER — ANCILLARY PROCEDURE (OUTPATIENT)
Dept: CARDIOLOGY | Facility: CLINIC | Age: 83
End: 2023-03-31
Attending: INTERNAL MEDICINE
Payer: MEDICARE

## 2023-03-31 DIAGNOSIS — I44.2 COMPLETE ATRIOVENTRICULAR BLOCK (H): ICD-10-CM

## 2023-03-31 DIAGNOSIS — Z95.0 CARDIAC PACEMAKER IN SITU: ICD-10-CM

## 2023-03-31 PROCEDURE — 93296 REM INTERROG EVL PM/IDS: CPT | Performed by: INTERNAL MEDICINE

## 2023-03-31 PROCEDURE — 93294 REM INTERROG EVL PM/LDLS PM: CPT | Performed by: INTERNAL MEDICINE

## 2023-04-05 ENCOUNTER — LAB (OUTPATIENT)
Dept: LAB | Facility: CLINIC | Age: 83
End: 2023-04-05
Payer: MEDICARE

## 2023-04-05 ENCOUNTER — ANTICOAGULATION THERAPY VISIT (OUTPATIENT)
Dept: ANTICOAGULATION | Facility: CLINIC | Age: 83
End: 2023-04-05

## 2023-04-05 DIAGNOSIS — Z95.2 S/P AORTIC VALVE REPLACEMENT: Primary | ICD-10-CM

## 2023-04-05 DIAGNOSIS — Z95.2 S/P MITRAL VALVE REPLACEMENT: ICD-10-CM

## 2023-04-05 DIAGNOSIS — I48.11 LONGSTANDING PERSISTENT ATRIAL FIBRILLATION (H): ICD-10-CM

## 2023-04-05 DIAGNOSIS — Z79.01 LONG TERM CURRENT USE OF ANTICOAGULANT THERAPY: ICD-10-CM

## 2023-04-05 DIAGNOSIS — Z95.2 S/P AORTIC VALVE REPLACEMENT: ICD-10-CM

## 2023-04-05 LAB — INR BLD: 2.9 (ref 0.9–1.1)

## 2023-04-05 PROCEDURE — 36416 COLLJ CAPILLARY BLOOD SPEC: CPT

## 2023-04-05 PROCEDURE — 85610 PROTHROMBIN TIME: CPT

## 2023-04-05 NOTE — PROGRESS NOTES
ANTICOAGULATION MANAGEMENT     Fausto Farr 82 year old male is on warfarin with therapeutic INR result. (Goal INR 2.5-3.5)    Recent labs: (last 7 days)     04/05/23  0759   INR 2.9*       ASSESSMENT       Source(s): Chart Review and Patient/Caregiver Call       Warfarin doses taken: Warfarin taken as instructed    Diet: No new diet changes identified    New illness, injury, or hospitalization: No    Medication/supplement changes: None noted    Signs or symptoms of bleeding or clotting: No    Previous INR: Therapeutic last 2(+) visits    Additional findings: None         PLAN     Recommended plan for no diet, medication or health factor changes affecting INR     Dosing Instructions: Continue your current warfarin dose with next INR in 5 weeks       Summary  As of 4/5/2023    Full warfarin instructions:  5 mg every Mon, Wed, Fri; 7.5 mg all other days   Next INR check:  5/10/2023             Telephone call with Ralph who agrees to plan and repeated back plan correctly    Lab visit scheduled    Education provided:     Please call back if any changes to your diet, medications or how you've been taking warfarin    Plan made per [facility] anticoagulation protocol    Ruthann Sanders RN  Anticoagulation Clinic  4/5/2023    _______________________________________________________________________     Anticoagulation Episode Summary     Current INR goal:  2.5-3.5   TTR:  76.5 % (11.9 mo)   Target end date:  Indefinite   Send INR reminders to:  [facility]    Indications    S/P aortic valve replacement [Z95.2]  S/P mitral valve replacement [Z95.2]  Long term current use of anticoagulant therapy [Z79.01]  Longstanding persistent atrial fibrillation (H) [I48.11]           Comments:  Per 9/20/22 Cardio Note strict INR goal of 2.5-3.5. If INR falls below 2.5 at any time, needs to be bridged with Lovenox. consent to leave DVM for medical information and scheduling         Anticoagulation Care Providers     Provider Role Specialty  Phone number    Jodi Flower Mai, MD Referring Internal Medicine - Pediatrics [phone]

## 2023-04-07 LAB
MDC_IDC_EPISODE_DTM: NORMAL
MDC_IDC_EPISODE_DURATION: 8 S
MDC_IDC_EPISODE_ID: NORMAL
MDC_IDC_EPISODE_TYPE: NORMAL
MDC_IDC_LEAD_IMPLANT_DT: NORMAL
MDC_IDC_LEAD_IMPLANT_DT: NORMAL
MDC_IDC_LEAD_LOCATION: NORMAL
MDC_IDC_LEAD_LOCATION: NORMAL
MDC_IDC_LEAD_LOCATION_DETAIL_1: NORMAL
MDC_IDC_LEAD_LOCATION_DETAIL_1: NORMAL
MDC_IDC_LEAD_MFG: NORMAL
MDC_IDC_LEAD_MFG: NORMAL
MDC_IDC_LEAD_MODEL: NORMAL
MDC_IDC_LEAD_MODEL: NORMAL
MDC_IDC_LEAD_POLARITY_TYPE: NORMAL
MDC_IDC_LEAD_POLARITY_TYPE: NORMAL
MDC_IDC_LEAD_SERIAL: NORMAL
MDC_IDC_LEAD_SERIAL: NORMAL
MDC_IDC_MSMT_BATTERY_DTM: NORMAL
MDC_IDC_MSMT_BATTERY_REMAINING_LONGEVITY: 156 MO
MDC_IDC_MSMT_BATTERY_REMAINING_PERCENTAGE: 100 %
MDC_IDC_MSMT_BATTERY_STATUS: NORMAL
MDC_IDC_MSMT_LEADCHNL_RA_IMPEDANCE_VALUE: 667 OHM
MDC_IDC_MSMT_LEADCHNL_RA_PACING_THRESHOLD_AMPLITUDE: 0.5 V
MDC_IDC_MSMT_LEADCHNL_RA_PACING_THRESHOLD_PULSEWIDTH: 0.4 MS
MDC_IDC_MSMT_LEADCHNL_RV_IMPEDANCE_VALUE: 587 OHM
MDC_IDC_MSMT_LEADCHNL_RV_PACING_THRESHOLD_AMPLITUDE: 0.9 V
MDC_IDC_MSMT_LEADCHNL_RV_PACING_THRESHOLD_PULSEWIDTH: 0.4 MS
MDC_IDC_PG_IMPLANT_DTM: NORMAL
MDC_IDC_PG_MFG: NORMAL
MDC_IDC_PG_MODEL: NORMAL
MDC_IDC_PG_SERIAL: NORMAL
MDC_IDC_PG_TYPE: NORMAL
MDC_IDC_SESS_CLINIC_NAME: NORMAL
MDC_IDC_SESS_DTM: NORMAL
MDC_IDC_SESS_TYPE: NORMAL
MDC_IDC_SET_BRADY_AT_MODE_SWITCH_MODE: NORMAL
MDC_IDC_SET_BRADY_AT_MODE_SWITCH_RATE: 170 {BEATS}/MIN
MDC_IDC_SET_BRADY_LOWRATE: 60 {BEATS}/MIN
MDC_IDC_SET_BRADY_MAX_SENSOR_RATE: 130 {BEATS}/MIN
MDC_IDC_SET_BRADY_MAX_TRACKING_RATE: 130 {BEATS}/MIN
MDC_IDC_SET_BRADY_MODE: NORMAL
MDC_IDC_SET_BRADY_PAV_DELAY_HIGH: 200 MS
MDC_IDC_SET_BRADY_PAV_DELAY_LOW: 250 MS
MDC_IDC_SET_BRADY_SAV_DELAY_HIGH: 200 MS
MDC_IDC_SET_BRADY_SAV_DELAY_LOW: 250 MS
MDC_IDC_SET_LEADCHNL_RA_PACING_AMPLITUDE: 2 V
MDC_IDC_SET_LEADCHNL_RA_PACING_CAPTURE_MODE: NORMAL
MDC_IDC_SET_LEADCHNL_RA_PACING_POLARITY: NORMAL
MDC_IDC_SET_LEADCHNL_RA_PACING_PULSEWIDTH: 0.4 MS
MDC_IDC_SET_LEADCHNL_RA_SENSING_ADAPTATION_MODE: NORMAL
MDC_IDC_SET_LEADCHNL_RA_SENSING_POLARITY: NORMAL
MDC_IDC_SET_LEADCHNL_RA_SENSING_SENSITIVITY: 0.25 MV
MDC_IDC_SET_LEADCHNL_RV_PACING_AMPLITUDE: 1.3 V
MDC_IDC_SET_LEADCHNL_RV_PACING_CAPTURE_MODE: NORMAL
MDC_IDC_SET_LEADCHNL_RV_PACING_POLARITY: NORMAL
MDC_IDC_SET_LEADCHNL_RV_PACING_PULSEWIDTH: 0.4 MS
MDC_IDC_SET_LEADCHNL_RV_SENSING_ADAPTATION_MODE: NORMAL
MDC_IDC_SET_LEADCHNL_RV_SENSING_POLARITY: NORMAL
MDC_IDC_SET_LEADCHNL_RV_SENSING_SENSITIVITY: 0.6 MV
MDC_IDC_SET_ZONE_DETECTION_INTERVAL: 375 MS
MDC_IDC_SET_ZONE_TYPE: NORMAL
MDC_IDC_SET_ZONE_VENDOR_TYPE: NORMAL
MDC_IDC_STAT_AT_BURDEN_PERCENT: 1 %
MDC_IDC_STAT_AT_DTM_END: NORMAL
MDC_IDC_STAT_AT_DTM_START: NORMAL
MDC_IDC_STAT_BRADY_DTM_END: NORMAL
MDC_IDC_STAT_BRADY_DTM_START: NORMAL
MDC_IDC_STAT_BRADY_RA_PERCENT_PACED: 13 %
MDC_IDC_STAT_BRADY_RV_PERCENT_PACED: 83 %
MDC_IDC_STAT_EPISODE_RECENT_COUNT: 0
MDC_IDC_STAT_EPISODE_RECENT_COUNT: 10
MDC_IDC_STAT_EPISODE_RECENT_COUNT: 5
MDC_IDC_STAT_EPISODE_RECENT_COUNT_DTM_END: NORMAL
MDC_IDC_STAT_EPISODE_RECENT_COUNT_DTM_START: NORMAL
MDC_IDC_STAT_EPISODE_TYPE: NORMAL
MDC_IDC_STAT_EPISODE_VENDOR_TYPE: NORMAL

## 2023-05-08 DIAGNOSIS — Z79.01 LONG TERM CURRENT USE OF ANTICOAGULANTS WITH INR GOAL OF 2.5-3.5: ICD-10-CM

## 2023-05-08 DIAGNOSIS — Z95.2 S/P AORTIC VALVE REPLACEMENT: ICD-10-CM

## 2023-05-08 DIAGNOSIS — I48.19 PERSISTENT ATRIAL FIBRILLATION (H): ICD-10-CM

## 2023-05-08 DIAGNOSIS — Z95.2 S/P MITRAL VALVE REPLACEMENT: ICD-10-CM

## 2023-05-10 ENCOUNTER — ANTICOAGULATION THERAPY VISIT (OUTPATIENT)
Dept: ANTICOAGULATION | Facility: CLINIC | Age: 83
End: 2023-05-10

## 2023-05-10 ENCOUNTER — LAB (OUTPATIENT)
Dept: LAB | Facility: CLINIC | Age: 83
End: 2023-05-10
Payer: MEDICARE

## 2023-05-10 DIAGNOSIS — I48.11 LONGSTANDING PERSISTENT ATRIAL FIBRILLATION (H): ICD-10-CM

## 2023-05-10 DIAGNOSIS — Z95.2 S/P MITRAL VALVE REPLACEMENT: ICD-10-CM

## 2023-05-10 DIAGNOSIS — I47.29 NSVT (NONSUSTAINED VENTRICULAR TACHYCARDIA) (H): ICD-10-CM

## 2023-05-10 DIAGNOSIS — Z95.2 S/P AORTIC VALVE REPLACEMENT: Primary | ICD-10-CM

## 2023-05-10 DIAGNOSIS — Z95.2 S/P AORTIC VALVE REPLACEMENT: ICD-10-CM

## 2023-05-10 DIAGNOSIS — Z79.01 LONG TERM CURRENT USE OF ANTICOAGULANT THERAPY: ICD-10-CM

## 2023-05-10 DIAGNOSIS — E11.9 DIABETES MELLITUS, TYPE 2 (H): Primary | ICD-10-CM

## 2023-05-10 LAB — INR BLD: 5.3 (ref 0.9–1.1)

## 2023-05-10 PROCEDURE — 85610 PROTHROMBIN TIME: CPT

## 2023-05-10 PROCEDURE — 36416 COLLJ CAPILLARY BLOOD SPEC: CPT

## 2023-05-10 RX ORDER — WARFARIN SODIUM 5 MG/1
TABLET ORAL
Qty: 120 TABLET | Refills: 1 | Status: SHIPPED | OUTPATIENT
Start: 2023-05-10 | End: 2023-12-15

## 2023-05-10 RX ORDER — METOPROLOL SUCCINATE 50 MG/1
TABLET, EXTENDED RELEASE ORAL
Qty: 135 TABLET | Refills: 1 | Status: SHIPPED | OUTPATIENT
Start: 2023-05-10 | End: 2023-10-24

## 2023-05-10 NOTE — PROGRESS NOTES
"ANTICOAGULATION MANAGEMENT     Fausto Farr 83 year old male is on warfarin with supratherapeutic INR result. (Goal INR 2.5-3.5)    Recent labs: (last 7 days)     05/10/23  0900   INR 5.3*       ASSESSMENT       Source(s): Chart Review and Patient/Caregiver Call       Warfarin doses taken: Warfarin taken differently, but did not change total weekly dose.  Missed dose on Sunday this week.  Took missed dose on Monday morning, then took his usual dose on Monday evening and resumed dosing as usual.    Diet: Patient has changed his diet since the last INR check.  He eats tuna for 3 days in a row with a variety of veggies and fruit, then eats his usual diet \"in moderation\" for the other 4 days.  He feels that he may be eating more green veggies than usual with this routine.    Medication/supplement changes: Taking 2 Tylenol as needed daily for pain since he has been doing more yard work recently.    New illness, injury, or hospitalization: No    Signs or symptoms of bleeding or clotting: No    Previous result: Therapeutic last 2(+) visits    Additional findings: None         PLAN     Recommended plan for temporary change(s) affecting INR     Dosing Instructions: hold 2 doses then continue your current warfarin dose with next INR in 2 days       Summary  As of 5/10/2023    Full warfarin instructions:  5/10: Hold; 5/11: Hold; Otherwise 5 mg every Mon, Wed, Fri; 7.5 mg all other days   Next INR check:  5/12/2023             Telephone call with Ralph who agrees to plan and repeated back plan correctly    Lab visit scheduled    Education provided:     Please call back if any changes to your diet, medications or how you've been taking warfarin    Goal range and lab monitoring: goal range and significance of current result    Symptom monitoring: monitoring for bleeding signs and symptoms and when to seek medical attention/emergency care    Plan made per [facility] anticoagulation protocol    Ruthann Sanders RN  Anticoagulation " Clinic  5/10/2023    _______________________________________________________________________     Anticoagulation Episode Summary     Current INR goal:  2.5-3.5   TTR:  69.2 % (11.9 mo)   Target end date:  Indefinite   Send INR reminders to:  [facility]    Indications    S/P aortic valve replacement [Z95.2]  S/P mitral valve replacement [Z95.2]  Long term current use of anticoagulant therapy [Z79.01]  Longstanding persistent atrial fibrillation (H) [I48.11]           Comments:  Per 9/20/22 Cardio Note strict INR goal of 2.5-3.5. If INR falls below 2.5 at any time, needs to be bridged with Lovenox. consent to leave DVM for medical information and scheduling         Anticoagulation Care Providers     Provider Role Specialty Phone number    Jodi Flower Mai, MD Referring Internal Medicine - Pediatrics [phone]

## 2023-05-10 NOTE — TELEPHONE ENCOUNTER
ANTICOAGULATION MANAGEMENT:  Medication Refill    Anticoagulation Summary  As of 5/10/2023    Warfarin maintenance plan:  5 mg (5 mg x 1) every Mon, Wed, Fri; 7.5 mg (5 mg x 1.5) all other days   Next INR check:  5/12/2023   Target end date:  Indefinite    Indications    S/P aortic valve replacement [Z95.2]  S/P mitral valve replacement [Z95.2]  Long term current use of anticoagulant therapy [Z79.01]  Longstanding persistent atrial fibrillation (H) [I48.11]             Anticoagulation Care Providers     Provider Role Specialty Phone number    Jodi Flower Mai, MD Referring Internal Medicine - Pediatrics [phone]          Visit with referring provider/group within last year: Yes    [facility] referral signed within last year: Yes    Fausto meets all criteria for refill (current [facility] referral, office visit with referring provider/group in last year, lab monitoring up to date or not exceeding 2 weeks overdue). Rx instructions and quantity supplied updated to match patient's current dosing plan. Warfarin 90 day supply with 1 refill granted per [facility] protocol     Ruthann Sanders RN  Anticoagulation Clinic

## 2023-05-10 NOTE — PROGRESS NOTES
ANTICOAGULATION MANAGEMENT     Fausto Farr 83 year old male is on warfarin with supratherapeutic INR result. (Goal INR 2.5-3.5)    Recent labs: (last 7 days)     05/10/23  0900   INR 5.3*       ASSESSMENT       Source(s): Chart Review    Previous INR was Therapeutic last 2(+) visits    Medication, diet, health changes since last INR chart reviewed; none identified             PLAN     Unable to reach Ralph today.    Left message to hold warfarin tonight. Request call back for assessment.     Follow up required to confirm warfarin dose taken and assess for changes    Ruthann Sanders RN  Anticoagulation Clinic  5/10/2023

## 2023-05-10 NOTE — PROGRESS NOTES
Message routed to PCP as an FYI due to critical INR result today.  Result was managed per anticoagulation protocol.

## 2023-05-15 ENCOUNTER — LAB (OUTPATIENT)
Dept: LAB | Facility: CLINIC | Age: 83
End: 2023-05-15
Payer: MEDICARE

## 2023-05-15 ENCOUNTER — ANTICOAGULATION THERAPY VISIT (OUTPATIENT)
Dept: ANTICOAGULATION | Facility: CLINIC | Age: 83
End: 2023-05-15

## 2023-05-15 DIAGNOSIS — Z95.2 S/P AORTIC VALVE REPLACEMENT: Primary | ICD-10-CM

## 2023-05-15 DIAGNOSIS — I48.11 LONGSTANDING PERSISTENT ATRIAL FIBRILLATION (H): ICD-10-CM

## 2023-05-15 DIAGNOSIS — Z95.2 S/P AORTIC VALVE REPLACEMENT: ICD-10-CM

## 2023-05-15 DIAGNOSIS — E11.9 DIABETES MELLITUS, TYPE 2 (H): ICD-10-CM

## 2023-05-15 DIAGNOSIS — Z79.01 LONG TERM CURRENT USE OF ANTICOAGULANT THERAPY: ICD-10-CM

## 2023-05-15 DIAGNOSIS — Z95.2 S/P MITRAL VALVE REPLACEMENT: ICD-10-CM

## 2023-05-15 LAB
HBA1C MFR BLD: 7.1 % (ref 0–5.6)
INR BLD: 2.4 (ref 0.9–1.1)

## 2023-05-15 PROCEDURE — 85610 PROTHROMBIN TIME: CPT

## 2023-05-15 PROCEDURE — 83036 HEMOGLOBIN GLYCOSYLATED A1C: CPT

## 2023-05-15 PROCEDURE — 36415 COLL VENOUS BLD VENIPUNCTURE: CPT

## 2023-05-15 NOTE — PROGRESS NOTES
ANTICOAGULATION MANAGEMENT     Fausto Farr 83 year old male is on warfarin with subtherapeutic INR result. (Goal INR 2.5-3.5)    Recent labs: (last 7 days)     05/15/23  0841   INR 2.4*       ASSESSMENT       Source(s): Chart Review and Patient/Caregiver Call       Warfarin doses taken: Warfarin taken as instructed - confirmed holds for 5/10 and 5/11. He got busy on 5/12 and forgot his appointment so rescheduled for today.    Diet: No new diet changes identified    Medication/supplement changes: None noted    New illness, injury, or hospitalization: No    Signs or symptoms of bleeding or clotting: No    Previous result: Supratherapeutic    Additional findings: No other changes to report since updates last visit when INR was critical.         PLAN     Recommended plan for temporary change(s) affecting INR     Dosing Instructions: Continue your current warfarin dose with next INR in 1 week, patient elected for 10 days    Summary  As of 5/15/2023    Full warfarin instructions:  5 mg every Mon, Wed, Fri; 7.5 mg all other days   Next INR check:  5/25/2023             Telephone call with Ralph who verbalizes understanding and agrees to plan    Lab visit scheduled    Education provided:     Please call back if any changes to your diet, medications or how you've been taking warfarin    Plan made per [facility] anticoagulation protocol    Maria Luz Rios RN  Anticoagulation Clinic  5/15/2023    _______________________________________________________________________     Anticoagulation Episode Summary     Current INR goal:  2.5-3.5   TTR:  68.3 % (11.9 mo)   Target end date:  Indefinite   Send INR reminders to:  SHANNA DOWELL    Indications    S/P aortic valve replacement [Z95.2]  S/P mitral valve replacement [Z95.2]  Long term current use of anticoagulant therapy [Z79.01]  Longstanding persistent atrial fibrillation (H) [I48.11]           Comments:  Per 9/20/22 Cardio Note strict INR goal of 2.5-3.5. If INR falls  below 2.5 at any time, needs to be bridged with Lovenox. consent to leave DVM for medical information and scheduling         Anticoagulation Care Providers     Provider Role Specialty Phone number    Jodi Flower Mai, MD Referring Internal Medicine - Pediatrics [phone]

## 2023-05-25 ENCOUNTER — LAB (OUTPATIENT)
Dept: LAB | Facility: CLINIC | Age: 83
End: 2023-05-25
Payer: MEDICARE

## 2023-05-25 ENCOUNTER — ANTICOAGULATION THERAPY VISIT (OUTPATIENT)
Dept: ANTICOAGULATION | Facility: CLINIC | Age: 83
End: 2023-05-25

## 2023-05-25 DIAGNOSIS — I48.11 LONGSTANDING PERSISTENT ATRIAL FIBRILLATION (H): ICD-10-CM

## 2023-05-25 DIAGNOSIS — Z95.2 S/P AORTIC VALVE REPLACEMENT: ICD-10-CM

## 2023-05-25 DIAGNOSIS — Z95.2 S/P MITRAL VALVE REPLACEMENT: ICD-10-CM

## 2023-05-25 DIAGNOSIS — Z95.2 S/P AORTIC VALVE REPLACEMENT: Primary | ICD-10-CM

## 2023-05-25 DIAGNOSIS — Z79.01 LONG TERM CURRENT USE OF ANTICOAGULANT THERAPY: ICD-10-CM

## 2023-05-25 LAB — INR BLD: 2.6 (ref 0.9–1.1)

## 2023-05-25 PROCEDURE — 36416 COLLJ CAPILLARY BLOOD SPEC: CPT

## 2023-05-25 PROCEDURE — 85610 PROTHROMBIN TIME: CPT

## 2023-05-25 NOTE — PROGRESS NOTES
ANTICOAGULATION MANAGEMENT     Fausto Farr 83 year old male is on warfarin with therapeutic INR result. (Goal INR 2.5-3.5)    Recent labs: (last 7 days)     05/25/23  0848   INR 2.6*       ASSESSMENT       Source(s): Chart Review    Previous INR was Therapeutic last visit; previously outside of goal range    Medication, diet, health changes since last INR chart reviewed; none identified             PLAN     Recommended plan for no diet, medication or health factor changes affecting INR     Dosing Instructions: Continue your current warfarin dose with next INR in 2 weeks       Summary  As of 5/25/2023    Full warfarin instructions:  5 mg every Mon, Wed, Fri; 7.5 mg all other days   Next INR check:  6/8/2023             Detailed voice message left for Ralph with dosing instructions and follow up date.     Contact [phone]  to schedule and with any changes, questions or concerns.     Education provided:     Please call back if any changes to your diet, medications or how you've been taking warfarin    Plan made per [facility] anticoagulation protocol    Jung Louise RN  Anticoagulation Clinic  5/25/2023    _______________________________________________________________________     Anticoagulation Episode Summary     Current INR goal:  2.5-3.5   TTR:  66.9 % (11.9 mo)   Target end date:  Indefinite   Send INR reminders to:  SHANNA DOWELL    Indications    S/P aortic valve replacement [Z95.2]  S/P mitral valve replacement [Z95.2]  Long term current use of anticoagulant therapy [Z79.01]  Longstanding persistent atrial fibrillation (H) [I48.11]           Comments:  Per 9/20/22 Cardio Note strict INR goal of 2.5-3.5. If INR falls below 2.5 at any time, needs to be bridged with Lovenox. consent to leave DVM for medical information and scheduling         Anticoagulation Care Providers     Provider Role Specialty Phone number    Jodi Flower Mai, MD Referring Internal Medicine - Pediatrics [phone]

## 2023-06-07 ENCOUNTER — OFFICE VISIT (OUTPATIENT)
Dept: SLEEP MEDICINE | Facility: CLINIC | Age: 83
End: 2023-06-07
Payer: MEDICARE

## 2023-06-07 VITALS
SYSTOLIC BLOOD PRESSURE: 135 MMHG | HEART RATE: 71 BPM | DIASTOLIC BLOOD PRESSURE: 74 MMHG | OXYGEN SATURATION: 95 % | HEIGHT: 65 IN | BODY MASS INDEX: 35.72 KG/M2 | WEIGHT: 214.4 LBS

## 2023-06-07 DIAGNOSIS — G47.33 OBSTRUCTIVE SLEEP APNEA (ADULT) (PEDIATRIC): Primary | ICD-10-CM

## 2023-06-07 PROCEDURE — 99214 OFFICE O/P EST MOD 30 MIN: CPT | Performed by: PHYSICIAN ASSISTANT

## 2023-06-07 NOTE — NURSING NOTE
"Chief Complaint   Patient presents with     Sleep Problem     CPAP follow up. Discuss new device.       Initial /74   Pulse 71   Ht 1.651 m (5' 5\")   Wt 97.3 kg (214 lb 6.4 oz)   SpO2 95%   BMI 35.68 kg/m   Estimated body mass index is 35.68 kg/m  as calculated from the following:    Height as of this encounter: 1.651 m (5' 5\").    Weight as of this encounter: 97.3 kg (214 lb 6.4 oz).    Medication Reconciliation: complete      DME: [facility]    ESS 9    KUN 1    Sascha Clay CMA (AAMA)  "

## 2023-06-08 ENCOUNTER — LAB (OUTPATIENT)
Dept: LAB | Facility: CLINIC | Age: 83
End: 2023-06-08
Payer: MEDICARE

## 2023-06-08 ENCOUNTER — ANTICOAGULATION THERAPY VISIT (OUTPATIENT)
Dept: ANTICOAGULATION | Facility: CLINIC | Age: 83
End: 2023-06-08

## 2023-06-08 DIAGNOSIS — Z95.2 S/P MITRAL VALVE REPLACEMENT: ICD-10-CM

## 2023-06-08 DIAGNOSIS — I48.11 LONGSTANDING PERSISTENT ATRIAL FIBRILLATION (H): ICD-10-CM

## 2023-06-08 DIAGNOSIS — Z79.01 LONG TERM CURRENT USE OF ANTICOAGULANT THERAPY: ICD-10-CM

## 2023-06-08 DIAGNOSIS — Z95.2 S/P AORTIC VALVE REPLACEMENT: ICD-10-CM

## 2023-06-08 DIAGNOSIS — Z95.2 S/P AORTIC VALVE REPLACEMENT: Primary | ICD-10-CM

## 2023-06-08 LAB — INR BLD: 2.7 (ref 0.9–1.1)

## 2023-06-08 PROCEDURE — 36416 COLLJ CAPILLARY BLOOD SPEC: CPT

## 2023-06-08 PROCEDURE — 85610 PROTHROMBIN TIME: CPT

## 2023-06-08 NOTE — PROGRESS NOTES
ANTICOAGULATION MANAGEMENT     Fausto Farr 83 year old male is on warfarin with therapeutic INR result. (Goal INR 2.5-3.5)    Recent labs: (last 7 days)     06/08/23  0859   INR 2.7*       ASSESSMENT       Source(s): Chart Review    Previous INR was Therapeutic last 2(+) visits    Medication, diet, health changes since last INR chart reviewed; none identified  I left a detailed voicemail with the orders reflected in flowsheet. I have also requested a call back if there have been any missed doses, concerns, illness, fever, or if there have been any changes in medications, activity level, or diet             PLAN     Recommended plan for no diet, medication or health factor changes affecting INR     Dosing Instructions: Continue your current warfarin dose with next INR in 3 weeks       Summary  As of 6/8/2023    Full warfarin instructions:  5 mg every Mon, Wed, Fri; 7.5 mg all other days   Next INR check:  6/29/2023             Detailed voice message left for Ralph with dosing instructions and follow up date.     Contact [phone]  to schedule and with any changes, questions or concerns.     Education provided:     Please call back if any changes to your diet, medications or how you've been taking warfarin    Plan made per [facility] anticoagulation protocol    Jesi Contreras, RN  Anticoagulation Clinic  6/8/2023    _______________________________________________________________________     Anticoagulation Episode Summary     Current INR goal:  2.5-3.5   TTR:  67.9 % (11.9 mo)   Target end date:  Indefinite   Send INR reminders to:  [facility]    Indications    S/P aortic valve replacement [Z95.2]  S/P mitral valve replacement [Z95.2]  Long term current use of anticoagulant therapy [Z79.01]  Longstanding persistent atrial fibrillation (H) [I48.11]           Comments:  Per 9/20/22 Cardio Note strict INR goal of 2.5-3.5. If INR falls below 2.5 at any time, needs to be bridged with Lovenox. consent to leave DVM  for medical information and scheduling         Anticoagulation Care Providers     Provider Role Specialty Phone number    Miguel AingJodi Mai, MD Referring Internal Medicine - Pediatrics [phone]

## 2023-06-29 ENCOUNTER — LAB (OUTPATIENT)
Dept: LAB | Facility: CLINIC | Age: 83
End: 2023-06-29
Payer: MEDICARE

## 2023-06-29 ENCOUNTER — ANTICOAGULATION THERAPY VISIT (OUTPATIENT)
Dept: ANTICOAGULATION | Facility: CLINIC | Age: 83
End: 2023-06-29

## 2023-06-29 DIAGNOSIS — I48.11 LONGSTANDING PERSISTENT ATRIAL FIBRILLATION (H): ICD-10-CM

## 2023-06-29 DIAGNOSIS — Z95.2 S/P MITRAL VALVE REPLACEMENT: ICD-10-CM

## 2023-06-29 DIAGNOSIS — Z79.01 LONG TERM CURRENT USE OF ANTICOAGULANT THERAPY: ICD-10-CM

## 2023-06-29 DIAGNOSIS — Z95.2 S/P AORTIC VALVE REPLACEMENT: Primary | ICD-10-CM

## 2023-06-29 DIAGNOSIS — Z95.2 S/P AORTIC VALVE REPLACEMENT: ICD-10-CM

## 2023-06-29 LAB — INR BLD: 2.5 (ref 0.9–1.1)

## 2023-06-29 PROCEDURE — 36416 COLLJ CAPILLARY BLOOD SPEC: CPT

## 2023-06-29 PROCEDURE — 85610 PROTHROMBIN TIME: CPT

## 2023-06-29 NOTE — PROGRESS NOTES
ANTICOAGULATION MANAGEMENT     Fausto Farr 83 year old male is on warfarin with therapeutic INR result. (Goal INR 2.5-3.5)    Recent labs: (last 7 days)     06/29/23  0853   INR 2.5*       ASSESSMENT       Source(s): Chart Review    Previous INR was Therapeutic last 2(+) visits    Medication, diet, health changes since last INR chart reviewed; none identified             PLAN     Recommended plan for no diet, medication or health factor changes affecting INR     Dosing Instructions: Continue your current warfarin dose with next INR in 4 weeks       Summary  As of 6/29/2023    Full warfarin instructions:  5 mg every Mon, Wed, Fri; 7.5 mg all other days   Next INR check:  7/27/2023             Detailed voice message left for Ralph with dosing instructions and follow up date.     Contact [phone]  to schedule and with any changes, questions or concerns.     Education provided:     None required    Plan made per [facility] anticoagulation protocol    Ruthann Sanders RN  Anticoagulation Clinic  6/29/2023    _______________________________________________________________________     Anticoagulation Episode Summary     Current INR goal:  2.5-3.5   TTR:  69.6 % (11.9 mo)   Target end date:  Indefinite   Send INR reminders to:  SHANNA DOWELL    Indications    S/P aortic valve replacement [Z95.2]  S/P mitral valve replacement [Z95.2]  Long term current use of anticoagulant therapy [Z79.01]  Longstanding persistent atrial fibrillation (H) [I48.11]           Comments:  Per 9/20/22 Cardio Note strict INR goal of 2.5-3.5. If INR falls below 2.5 at any time, needs to be bridged with Lovenox. consent to leave DVM for medical information and scheduling         Anticoagulation Care Providers     Provider Role Specialty Phone number    Jodi Flower Mai, MD Referring Internal Medicine - Pediatrics [phone]

## 2023-07-11 DIAGNOSIS — E78.2 MIXED HYPERLIPIDEMIA: ICD-10-CM

## 2023-07-11 DIAGNOSIS — K51.20 ULCERATIVE PROCTITIS WITHOUT COMPLICATION (H): ICD-10-CM

## 2023-07-11 NOTE — TELEPHONE ENCOUNTER
Received a call from the patient   - Patient states that he needs refills/renewals of:     mesalamine (ASACOL HD) 800 MG EC tablet    rosuvastatin (CRESTOR) 40 MG tablet    ezetimibe (ZETIA) 10 MG tablet    - Patient states that he needs this renewals faxed to [facility] at [phone]   [facility] Mail order in [facility]:   - Fax: [phone]   - Phone: [phone]     - Pended prescription renewals for the patient's mesalamine (ASACOL HD) 800 MG EC tablet, rosuvastatin (CRESTOR) 40 MG tablet, and ezetimibe (ZETIA) 10 MG tablet (local print)     mesalamine (ASACOL HD) 800 MG EC tablet 180 tablet 3 8/8/2022  No   Sig - Route: Take 1 tablet (800 mg) by mouth 2 times daily - Oral     rosuvastatin (CRESTOR) 40 MG tablet 90 tablet 3 8/8/2022  No   Sig - Route: Take 1 tablet (40 mg) by mouth daily - Oral     ezetimibe (ZETIA) 10 MG tablet 90 tablet 3 8/8/2022  No   Sig - Route: Take 1 tablet (10 mg) by mouth daily - Oral     Routing to the covering provider to review and order as appropriate.   - Once ordered, please route back so that the prescriptions can be faxed to [facility] at [phone].     Lluvia TOELDO RN   Patient Advocate Liaison (PAL)  [facility]   [phone]

## 2023-07-12 RX ORDER — EZETIMIBE 10 MG/1
10 TABLET ORAL DAILY
Qty: 90 TABLET | Refills: 0 | Status: SHIPPED | OUTPATIENT
Start: 2023-07-12 | End: 2023-08-18

## 2023-07-12 RX ORDER — ROSUVASTATIN CALCIUM 40 MG/1
40 TABLET, COATED ORAL DAILY
Qty: 90 TABLET | Refills: 0 | Status: SHIPPED | OUTPATIENT
Start: 2023-07-12 | End: 2023-08-18

## 2023-07-12 RX ORDER — MESALAMINE 800 MG/1
1 TABLET, DELAYED RELEASE ORAL 2 TIMES DAILY
Qty: 180 TABLET | Refills: 0 | Status: SHIPPED | OUTPATIENT
Start: 2023-07-12 | End: 2023-08-18

## 2023-07-12 NOTE — TELEPHONE ENCOUNTER
Refills approved and signed.  Please fax where needed.  Please also notify patient that he appears to be due for a medication followup.

## 2023-07-13 NOTE — TELEPHONE ENCOUNTER
Faxed the patient's mesalamine (ASACOL HD) 800 MG EC tablet, rosuvastatin (CRESTOR) 40 MG tablet, and ezetimibe (ZETIA) 10 MG tablet (local print) prescriptions to [facility] at [phone]     mesalamine (ASACOL HD) 800 MG EC tablet 180 tablet 0 7/12/2023  No   Sig - Route: Take 1 tablet (800 mg) by mouth 2 times daily - Oral   Class: Local Print     rosuvastatin (CRESTOR) 40 MG tablet 90 tablet 0 7/12/2023  No   Sig - Route: Take 1 tablet (40 mg) by mouth daily - Oral   Class: Local Print     ezetimibe (ZETIA) 10 MG tablet 90 tablet 0 7/12/2023  No   Sig - Route: Take 1 tablet (10 mg) by mouth daily - Oral   Class: Local Print     MA-TC pool, please assist the patient in scheduling a medication follow-up appointment.     Lluvia TOLEDO RN   Patient Advocate Liaison (PAL)  [facility]   [phone]

## 2023-07-17 ENCOUNTER — ANCILLARY PROCEDURE (OUTPATIENT)
Dept: CARDIOLOGY | Facility: CLINIC | Age: 83
End: 2023-07-17
Attending: INTERNAL MEDICINE
Payer: MEDICARE

## 2023-07-17 DIAGNOSIS — I44.2 COMPLETE ATRIOVENTRICULAR BLOCK (H): ICD-10-CM

## 2023-07-17 DIAGNOSIS — Z95.0 CARDIAC PACEMAKER IN SITU: ICD-10-CM

## 2023-07-17 LAB
MDC_IDC_EPISODE_DTM: NORMAL
MDC_IDC_EPISODE_DURATION: 2 S
MDC_IDC_EPISODE_ID: NORMAL
MDC_IDC_EPISODE_TYPE: NORMAL
MDC_IDC_LEAD_IMPLANT_DT: NORMAL
MDC_IDC_LEAD_IMPLANT_DT: NORMAL
MDC_IDC_LEAD_LOCATION: NORMAL
MDC_IDC_LEAD_LOCATION: NORMAL
MDC_IDC_LEAD_LOCATION_DETAIL_1: NORMAL
MDC_IDC_LEAD_LOCATION_DETAIL_1: NORMAL
MDC_IDC_LEAD_MFG: NORMAL
MDC_IDC_LEAD_MFG: NORMAL
MDC_IDC_LEAD_MODEL: NORMAL
MDC_IDC_LEAD_MODEL: NORMAL
MDC_IDC_LEAD_POLARITY_TYPE: NORMAL
MDC_IDC_LEAD_POLARITY_TYPE: NORMAL
MDC_IDC_LEAD_SERIAL: NORMAL
MDC_IDC_LEAD_SERIAL: NORMAL
MDC_IDC_MSMT_BATTERY_DTM: NORMAL
MDC_IDC_MSMT_BATTERY_REMAINING_LONGEVITY: 150 MO
MDC_IDC_MSMT_BATTERY_REMAINING_PERCENTAGE: 100 %
MDC_IDC_MSMT_BATTERY_STATUS: NORMAL
MDC_IDC_MSMT_LEADCHNL_RA_IMPEDANCE_VALUE: 716 OHM
MDC_IDC_MSMT_LEADCHNL_RA_PACING_THRESHOLD_AMPLITUDE: 0.5 V
MDC_IDC_MSMT_LEADCHNL_RA_PACING_THRESHOLD_PULSEWIDTH: 0.4 MS
MDC_IDC_MSMT_LEADCHNL_RV_IMPEDANCE_VALUE: 631 OHM
MDC_IDC_MSMT_LEADCHNL_RV_PACING_THRESHOLD_AMPLITUDE: 0.9 V
MDC_IDC_MSMT_LEADCHNL_RV_PACING_THRESHOLD_PULSEWIDTH: 0.4 MS
MDC_IDC_PG_IMPLANT_DTM: NORMAL
MDC_IDC_PG_MFG: NORMAL
MDC_IDC_PG_MODEL: NORMAL
MDC_IDC_PG_SERIAL: NORMAL
MDC_IDC_PG_TYPE: NORMAL
MDC_IDC_SESS_CLINIC_NAME: NORMAL
MDC_IDC_SESS_DTM: NORMAL
MDC_IDC_SESS_TYPE: NORMAL
MDC_IDC_SET_BRADY_AT_MODE_SWITCH_MODE: NORMAL
MDC_IDC_SET_BRADY_AT_MODE_SWITCH_RATE: 170 {BEATS}/MIN
MDC_IDC_SET_BRADY_LOWRATE: 60 {BEATS}/MIN
MDC_IDC_SET_BRADY_MAX_SENSOR_RATE: 130 {BEATS}/MIN
MDC_IDC_SET_BRADY_MAX_TRACKING_RATE: 130 {BEATS}/MIN
MDC_IDC_SET_BRADY_MODE: NORMAL
MDC_IDC_SET_BRADY_PAV_DELAY_HIGH: 200 MS
MDC_IDC_SET_BRADY_PAV_DELAY_LOW: 250 MS
MDC_IDC_SET_BRADY_SAV_DELAY_HIGH: 200 MS
MDC_IDC_SET_BRADY_SAV_DELAY_LOW: 250 MS
MDC_IDC_SET_LEADCHNL_RA_PACING_AMPLITUDE: 2 V
MDC_IDC_SET_LEADCHNL_RA_PACING_CAPTURE_MODE: NORMAL
MDC_IDC_SET_LEADCHNL_RA_PACING_POLARITY: NORMAL
MDC_IDC_SET_LEADCHNL_RA_PACING_PULSEWIDTH: 0.4 MS
MDC_IDC_SET_LEADCHNL_RA_SENSING_ADAPTATION_MODE: NORMAL
MDC_IDC_SET_LEADCHNL_RA_SENSING_POLARITY: NORMAL
MDC_IDC_SET_LEADCHNL_RA_SENSING_SENSITIVITY: 0.25 MV
MDC_IDC_SET_LEADCHNL_RV_PACING_AMPLITUDE: 1.4 V
MDC_IDC_SET_LEADCHNL_RV_PACING_CAPTURE_MODE: NORMAL
MDC_IDC_SET_LEADCHNL_RV_PACING_POLARITY: NORMAL
MDC_IDC_SET_LEADCHNL_RV_PACING_PULSEWIDTH: 0.4 MS
MDC_IDC_SET_LEADCHNL_RV_SENSING_ADAPTATION_MODE: NORMAL
MDC_IDC_SET_LEADCHNL_RV_SENSING_POLARITY: NORMAL
MDC_IDC_SET_LEADCHNL_RV_SENSING_SENSITIVITY: 0.6 MV
MDC_IDC_SET_ZONE_DETECTION_INTERVAL: 375 MS
MDC_IDC_SET_ZONE_TYPE: NORMAL
MDC_IDC_SET_ZONE_VENDOR_TYPE: NORMAL
MDC_IDC_STAT_AT_BURDEN_PERCENT: 1 %
MDC_IDC_STAT_AT_DTM_END: NORMAL
MDC_IDC_STAT_AT_DTM_START: NORMAL
MDC_IDC_STAT_BRADY_DTM_END: NORMAL
MDC_IDC_STAT_BRADY_DTM_START: NORMAL
MDC_IDC_STAT_BRADY_RA_PERCENT_PACED: 15 %
MDC_IDC_STAT_BRADY_RV_PERCENT_PACED: 86 %
MDC_IDC_STAT_EPISODE_RECENT_COUNT: 0
MDC_IDC_STAT_EPISODE_RECENT_COUNT: 14
MDC_IDC_STAT_EPISODE_RECENT_COUNT: 7
MDC_IDC_STAT_EPISODE_RECENT_COUNT_DTM_END: NORMAL
MDC_IDC_STAT_EPISODE_RECENT_COUNT_DTM_START: NORMAL
MDC_IDC_STAT_EPISODE_TYPE: NORMAL
MDC_IDC_STAT_EPISODE_VENDOR_TYPE: NORMAL

## 2023-07-17 PROCEDURE — 93296 REM INTERROG EVL PM/IDS: CPT | Performed by: INTERNAL MEDICINE

## 2023-07-17 PROCEDURE — 93294 REM INTERROG EVL PM/LDLS PM: CPT | Performed by: INTERNAL MEDICINE

## 2023-07-20 ENCOUNTER — TELEPHONE (OUTPATIENT)
Dept: PEDIATRICS | Facility: CLINIC | Age: 83
End: 2023-07-20
Payer: MEDICARE

## 2023-07-26 ENCOUNTER — LAB (OUTPATIENT)
Dept: LAB | Facility: CLINIC | Age: 83
End: 2023-07-26
Payer: MEDICARE

## 2023-07-26 ENCOUNTER — ANTICOAGULATION THERAPY VISIT (OUTPATIENT)
Dept: ANTICOAGULATION | Facility: CLINIC | Age: 83
End: 2023-07-26

## 2023-07-26 ENCOUNTER — TELEPHONE (OUTPATIENT)
Dept: PEDIATRICS | Facility: CLINIC | Age: 83
End: 2023-07-26

## 2023-07-26 DIAGNOSIS — I48.11 LONGSTANDING PERSISTENT ATRIAL FIBRILLATION (H): ICD-10-CM

## 2023-07-26 DIAGNOSIS — Z95.2 S/P AORTIC VALVE REPLACEMENT: Primary | ICD-10-CM

## 2023-07-26 DIAGNOSIS — Z95.2 S/P MITRAL VALVE REPLACEMENT: ICD-10-CM

## 2023-07-26 DIAGNOSIS — Z79.01 LONG TERM CURRENT USE OF ANTICOAGULANT THERAPY: ICD-10-CM

## 2023-07-26 DIAGNOSIS — Z95.2 S/P AORTIC VALVE REPLACEMENT: ICD-10-CM

## 2023-07-26 LAB — INR BLD: 2.3 (ref 0.9–1.1)

## 2023-07-26 PROCEDURE — 36416 COLLJ CAPILLARY BLOOD SPEC: CPT

## 2023-07-26 PROCEDURE — 85610 PROTHROMBIN TIME: CPT

## 2023-07-26 NOTE — TELEPHONE ENCOUNTER
Routing to [facility] pool.    JUSTIN Stratton  Patient Advocate Liason (PAL)  [facility]  Ph. [phone] / Fax. [phone]

## 2023-07-26 NOTE — TELEPHONE ENCOUNTER
General Call    Contacts         Type Contact Phone/Fax    07/26/2023 04:17 PM CDT Phone (Incoming) Ralph Farr (Self) [phone] (H)          Reason for Call:  anticoagulation    What are your questions or concerns:  Patient is calling to find out dosing instructions.  informed patient that the INR nurse has not reviewed his results yet, but they will call him as soon as they are available.     Date of last appointment with provider: 7/26/23    Could we send this information to you in [employer] or would you prefer to receive a phone call?:   Patient would prefer a phone call   Okay to leave a detailed message?: Yes at Home number on file [phone] (home)

## 2023-07-26 NOTE — PROGRESS NOTES
ANTICOAGULATION MANAGEMENT     Fausto Farr 83 year old male is on warfarin with subtherapeutic INR result. (Goal INR 2.5-3.5)    Recent labs: (last 7 days)     07/26/23  1600   INR 2.3*       ASSESSMENT     Source(s): Chart Review and Patient/Caregiver Call     Warfarin doses taken: Warfarin taken as instructed  Diet: Increased greens/vitamin K in diet; ongoing change - states he wants to continuing with this amount of greens throughout the summer because of his garden.   Medication/supplement changes: None noted  New illness, injury, or hospitalization: No  Signs or symptoms of bleeding or clotting: No  Previous result: Therapeutic last 2(+) visits  Additional findings: None       PLAN     Recommended plan for ongoing change(s) affecting INR     Dosing Instructions: booster dose then Increase your warfarin dose (5.6% change) with next INR in 1 week       Summary  As of 7/26/2023      Full warfarin instructions:  7/26: 10 mg; Otherwise 5 mg every Mon, Fri; 7.5 mg all other days   Next INR check:  8/1/2023               Telephone call with Ralph who verbalizes understanding and agrees to plan    Check at provider office visit    Education provided:   Goal range and lab monitoring: goal range and significance of current result and Importance of therapeutic range  Dietary considerations: importance of consistent vitamin K intake and impact of vitamin K foods on INR    Plan made per [facility] anticoagulation protocol    Jung Louise RN  Anticoagulation Clinic  7/26/2023    _______________________________________________________________________     Anticoagulation Episode Summary       Current INR goal:  2.5-3.5   TTR:  63.6 % (11.9 mo)   Target end date:  Indefinite   Send INR reminders to:  SHANNA DOWELL    Indications    S/P aortic valve replacement [Z95.2]  S/P mitral valve replacement [Z95.2]  Long term current use of anticoagulant therapy [Z79.01]  Longstanding persistent atrial fibrillation (H) [I48.11]              Comments:  Per 9/20/22 Cardio Note strict INR goal of 2.5-3.5. If INR falls below 2.5 at any time, needs to be bridged with Lovenox. consent to leave DVM for medical information and scheduling             Anticoagulation Care Providers       Provider Role Specialty Phone number    Jodi Flower Mai, MD Referring Internal Medicine - Pediatrics [phone]

## 2023-07-26 NOTE — TELEPHONE ENCOUNTER
I spoke to patient's wife, this message was from earlier, she did confirm that Ralph spoke to [facility] RN and has his dosing and next INR scheduled.    Aure Sampson RN  Anticoagulation Clinic

## 2023-08-01 ENCOUNTER — OFFICE VISIT (OUTPATIENT)
Dept: PEDIATRICS | Facility: CLINIC | Age: 83
End: 2023-08-01
Payer: MEDICARE

## 2023-08-01 ENCOUNTER — ANTICOAGULATION THERAPY VISIT (OUTPATIENT)
Dept: ANTICOAGULATION | Facility: CLINIC | Age: 83
End: 2023-08-01

## 2023-08-01 VITALS
SYSTOLIC BLOOD PRESSURE: 116 MMHG | RESPIRATION RATE: 16 BRPM | HEART RATE: 60 BPM | BODY MASS INDEX: 35.28 KG/M2 | TEMPERATURE: 97.9 F | WEIGHT: 212 LBS | DIASTOLIC BLOOD PRESSURE: 63 MMHG | OXYGEN SATURATION: 98 %

## 2023-08-01 DIAGNOSIS — E11.9 TYPE 2 DIABETES MELLITUS WITHOUT COMPLICATION, WITHOUT LONG-TERM CURRENT USE OF INSULIN (H): Primary | ICD-10-CM

## 2023-08-01 DIAGNOSIS — I48.11 LONGSTANDING PERSISTENT ATRIAL FIBRILLATION (H): ICD-10-CM

## 2023-08-01 DIAGNOSIS — I10 ESSENTIAL HYPERTENSION, BENIGN: ICD-10-CM

## 2023-08-01 DIAGNOSIS — Z95.2 S/P AORTIC VALVE REPLACEMENT: ICD-10-CM

## 2023-08-01 DIAGNOSIS — Z12.5 SCREENING FOR PROSTATE CANCER: ICD-10-CM

## 2023-08-01 DIAGNOSIS — I25.10 CORONARY ARTERY DISEASE WITHOUT ANGINA PECTORIS, UNSPECIFIED VESSEL OR LESION TYPE, UNSPECIFIED WHETHER NATIVE OR TRANSPLANTED HEART: ICD-10-CM

## 2023-08-01 DIAGNOSIS — E78.2 MIXED HYPERLIPIDEMIA: ICD-10-CM

## 2023-08-01 DIAGNOSIS — Z79.01 LONG TERM CURRENT USE OF ANTICOAGULANT THERAPY: ICD-10-CM

## 2023-08-01 DIAGNOSIS — Z95.2 S/P MITRAL VALVE REPLACEMENT: ICD-10-CM

## 2023-08-01 DIAGNOSIS — Z95.2 S/P AORTIC VALVE REPLACEMENT: Primary | ICD-10-CM

## 2023-08-01 LAB
ALBUMIN SERPL BCG-MCNC: 4.4 G/DL (ref 3.5–5.2)
ALP SERPL-CCNC: 57 U/L (ref 40–129)
ALT SERPL W P-5'-P-CCNC: 28 U/L (ref 0–70)
ANION GAP SERPL CALCULATED.3IONS-SCNC: 10 MMOL/L (ref 7–15)
AST SERPL W P-5'-P-CCNC: 33 U/L (ref 0–45)
BILIRUB SERPL-MCNC: 0.2 MG/DL
BUN SERPL-MCNC: 17 MG/DL (ref 8–23)
CALCIUM SERPL-MCNC: 9.5 MG/DL (ref 8.8–10.2)
CHLORIDE SERPL-SCNC: 104 MMOL/L (ref 98–107)
CHOLEST SERPL-MCNC: 122 MG/DL
CREAT SERPL-MCNC: 0.94 MG/DL (ref 0.67–1.17)
DEPRECATED HCO3 PLAS-SCNC: 26 MMOL/L (ref 22–29)
GFR SERPL CREATININE-BSD FRML MDRD: 81 ML/MIN/1.73M2
GLUCOSE SERPL-MCNC: 137 MG/DL (ref 70–99)
HBA1C MFR BLD: 6.7 % (ref 0–5.6)
HDLC SERPL-MCNC: 31 MG/DL
INR BLD: 3.2 (ref 0.9–1.1)
LDLC SERPL CALC-MCNC: 57 MG/DL
NONHDLC SERPL-MCNC: 91 MG/DL
POTASSIUM SERPL-SCNC: 4.3 MMOL/L (ref 3.4–5.3)
PROT SERPL-MCNC: 7.3 G/DL (ref 6.4–8.3)
PSA SERPL DL<=0.01 NG/ML-MCNC: <0.01 NG/ML
SODIUM SERPL-SCNC: 140 MMOL/L (ref 136–145)
TRIGL SERPL-MCNC: 172 MG/DL

## 2023-08-01 PROCEDURE — G0103 PSA SCREENING: HCPCS | Performed by: PHYSICIAN ASSISTANT

## 2023-08-01 PROCEDURE — 80053 COMPREHEN METABOLIC PANEL: CPT | Performed by: PHYSICIAN ASSISTANT

## 2023-08-01 PROCEDURE — 36415 COLL VENOUS BLD VENIPUNCTURE: CPT | Performed by: PHYSICIAN ASSISTANT

## 2023-08-01 PROCEDURE — 80061 LIPID PANEL: CPT | Performed by: PHYSICIAN ASSISTANT

## 2023-08-01 PROCEDURE — 99214 OFFICE O/P EST MOD 30 MIN: CPT | Performed by: PHYSICIAN ASSISTANT

## 2023-08-01 PROCEDURE — 85610 PROTHROMBIN TIME: CPT | Performed by: PHYSICIAN ASSISTANT

## 2023-08-01 PROCEDURE — 83036 HEMOGLOBIN GLYCOSYLATED A1C: CPT | Performed by: PHYSICIAN ASSISTANT

## 2023-08-01 ASSESSMENT — PAIN SCALES - GENERAL: PAINLEVEL: NO PAIN (0)

## 2023-08-01 NOTE — PROGRESS NOTES
ANTICOAGULATION MANAGEMENT     Fausto Farr 83 year old male is on warfarin with therapeutic INR result. (Goal INR 2.5-3.5)    Recent labs: (last 7 days)     08/01/23  0915   INR 3.2*       ASSESSMENT     Source(s): Chart Review and Patient/Caregiver Call     Warfarin doses taken: Booster dose(s) recently taken which may be affecting INR  Diet: Increased greens/vitamin K in diet; ongoing change--will continue to eat more greens from his garden (salad, green beans, zucchini).  Medication/supplement changes: None noted  New illness, injury, or hospitalization: No  Signs or symptoms of bleeding or clotting: No  Previous result: Subtherapeutic  Additional findings: Office visit today for diabetes follow up, notes are not complete  Warfarin maintenance dose was increased 5% at last visit       PLAN     Recommended plan for ongoing change(s) affecting INR     Dosing Instructions: Continue your current warfarin dose with next INR in 2 weeks       Summary  As of 8/1/2023      Full warfarin instructions:  5 mg every Mon, Fri; 7.5 mg all other days   Next INR check:  8/15/2023               Telephone call with Ralph who verbalizes understanding and agrees to plan    Lab visit scheduled    Education provided:   Dietary considerations: importance of consistent vitamin K intake and vitamin K content of foods    Plan made per [facility] anticoagulation protocol    Aure Sampson, RN  Anticoagulation Clinic  8/1/2023    _______________________________________________________________________     Anticoagulation Episode Summary       Current INR goal:  2.5-3.5   TTR:  64.6 % (11.9 mo)   Target end date:  Indefinite   Send INR reminders to:  SHANNA DOWELL    Indications    S/P aortic valve replacement [Z95.2]  S/P mitral valve replacement [Z95.2]  Long term current use of anticoagulant therapy [Z79.01]  Longstanding persistent atrial fibrillation (H) [I48.11]             Comments:  Per 9/20/22 Cardio Note strict INR goal of 2.5-3.5. If  INR falls below 2.5 at any time, needs to be bridged with Lovenox. consent to leave DVM for medical information and scheduling             Anticoagulation Care Providers       Provider Role Specialty Phone number    Jodi Flower Mai, MD Referring Internal Medicine - Pediatrics [phone]

## 2023-08-01 NOTE — PROGRESS NOTES
"  Assessment & Plan     Type 2 diabetes mellitus without complication, without long-term current use of insulin (H)    - Adult Eye  Referral; Future  - HEMOGLOBIN A1C; Future  - HEMOGLOBIN A1C    Essential hypertension, benign    - Comprehensive metabolic panel (BMP + Alb, Alk Phos, ALT, AST, Total. Bili, TP); Future  - Comprehensive metabolic panel (BMP + Alb, Alk Phos, ALT, AST, Total. Bili, TP)    Mixed hyperlipidemia      Coronary artery disease without angina pectoris, unspecified vessel or lesion type, unspecified whether native or transplanted heart    - Lipid panel reflex to direct LDL Non-fasting; Future  - Lipid panel reflex to direct LDL Non-fasting    Screening for prostate cancer    - PROSTATE SPEC ANTIGEN SCREEN; Future  - PROSTATE SPEC ANTIGEN SCREEN    Longstanding persistent atrial fibrillation (H)    - INR point of care    S/P aortic valve replacement    - INR point of care      Patient is well appearing without concerns today.  He does not need medication refills, but labs are needed.  They have been ordered and drawn today.           BMI:   Estimated body mass index is 35.28 kg/m  as calculated from the following:    Height as of 6/7/23: 1.651 m (5' 5\").    Weight as of this encounter: 96.2 kg (212 lb).     Any Alfred PA-C  [facility] DELMER Rosas is a 83 year old, presenting for the following health issues:  RECHECK      8/1/2023     8:25 AM   Additional Questions   Roomed by Yesenia Felipe CMA   Accompanied by N/A         8/1/2023     8:25 AM   Patient Reported Additional Medications   Patient reports taking the following new medications N/A       HPI     Patient is here for a medication recheck, though he says that he does not need any refills.  He states he is doing well without concerns today.        Review of Systems   Constitutional, HEENT, cardiovascular, pulmonary, gi and gu systems are negative, except as otherwise noted.      Objective  "   /63 (BP Location: Right arm, Patient Position: Sitting, Cuff Size: Adult Large)   Pulse 60   Temp 97.9  F (36.6  C) (Oral)   Resp 16   Wt 96.2 kg (212 lb)   SpO2 98%   BMI 35.28 kg/m    Body mass index is 35.28 kg/m .  Physical Exam   GENERAL: healthy, alert and no distress  EYES: Eyes grossly normal to inspection, PERRL and conjunctivae and sclerae normal  RESP: lungs clear to auscultation - no rales, rhonchi or wheezes  CV: regular rate and rhythm, normal S1 S2, no S3 or S4, no murmur, click or rub, no peripheral edema and peripheral pulses strong  MS: no gross musculoskeletal defects noted, no edema  NEURO: Normal strength and tone, mentation intact and speech normal    Labs - Pending    Any Alfred PA-C

## 2023-08-06 NOTE — RESULT ENCOUNTER NOTE
Sarah Rosas ,    The results from your recent lab work appear within goal, or normal limits.        Thank you for choosing [facility] for your health care needs,      Any Alfred PA-C

## 2023-08-18 ENCOUNTER — TELEPHONE (OUTPATIENT)
Dept: PEDIATRICS | Facility: CLINIC | Age: 83
End: 2023-08-18
Payer: MEDICARE

## 2023-08-18 DIAGNOSIS — K51.20 ULCERATIVE PROCTITIS WITHOUT COMPLICATION (H): ICD-10-CM

## 2023-08-18 DIAGNOSIS — E78.2 MIXED HYPERLIPIDEMIA: ICD-10-CM

## 2023-08-18 RX ORDER — MESALAMINE 800 MG/1
1 TABLET, DELAYED RELEASE ORAL 2 TIMES DAILY
Qty: 180 TABLET | Refills: 0 | Status: SHIPPED | OUTPATIENT
Start: 2023-08-18 | End: 2023-10-24

## 2023-08-18 RX ORDER — EZETIMIBE 10 MG/1
10 TABLET ORAL DAILY
Qty: 90 TABLET | Refills: 0 | Status: SHIPPED | OUTPATIENT
Start: 2023-08-18 | End: 2023-10-24

## 2023-08-18 RX ORDER — ROSUVASTATIN CALCIUM 40 MG/1
40 TABLET, COATED ORAL DAILY
Qty: 90 TABLET | Refills: 0 | Status: SHIPPED | OUTPATIENT
Start: 2023-08-18 | End: 2023-10-24

## 2023-08-18 NOTE — TELEPHONE ENCOUNTER
Medication Question or Refill        What medication are you calling about (include dose and sig)?: pt is wanting a 3 month supply of all medications pt only got 30 day refill  mesalamine (ASACOL HD) 800 MG EC tablet   rosuvastatin (CRESTOR) 40 MG tablet   ezetimibe (ZETIA) 10 MG tablet     Preferred Pharmacy:       [facility]  [address]  [place]  Phone: [phone] Fax: [phone]      Controlled Substance Agreement on file:   CSA -- Patient Level:    CSA: None found at the patient level.       Who prescribed the medication?: PCP    Do you need a refill? No pt got a 30 day supply recently but is wanting a 3 month supply, please call pt back to discuss.    When did you use the medication last? NA    Patient offered an appointment? No    Do you have any questions or concerns?  No      Could we send this information to you in [facility] or would you prefer to receive a phone call?:   Patient would prefer a phone call   Okay to leave a detailed message?: Yes at Home number on file [phone] (home)

## 2023-08-18 NOTE — TELEPHONE ENCOUNTER
Printed the patient's mesalamine (ASACOL HD) 800 MG EC tablet, rosuvastatin (CRESTOR) 40 MG tablet, and ezetimibe (ZETIA) 10 MG tablet prescriptions   - Faxed the patient's mesalamine (ASACOL HD) 800 MG EC tablet, rosuvastatin (CRESTOR) 40 MG tablet, and ezetimibe (ZETIA) 10 MG tablet medication prescriptions to the [facility] at [phone]     Called the patient at [phone] and left a message requesting a call back   - Provided the PAL RN direct line   - Awaiting a call back at this time     When the patient calls back:   - Inform the patient that his mesalamine (ASACOL HD) 800 MG EC tablet, rosuvastatin (CRESTOR) 40 MG tablet, and ezetimibe (ZETIA) 10 MG tablet medication prescriptions have been faxed to the [facility] at [phone]     Lluvia TOLEDO RN   Patient Advocate Liaison (PAL)  [facility]   [phone]

## 2023-08-18 NOTE — TELEPHONE ENCOUNTER
Upon chart review, the patient's mesalamine (ASACOL HD) 800 MG EC tablet, rosuvastatin (CRESTOR) 40 MG tablet, and ezetimibe (ZETIA) 10 MG tablet medications were faxed to the  [facility] at [phone] on 7/11/2023   - Any Alfred PA-C ordered the patient's mesalamine (ASACOL HD) 800 MG EC tablet, rosuvastatin (CRESTOR) 40 MG tablet, and ezetimibe (ZETIA) 10 MG tablet medications on 7/11/2023 for a 90 day supply and reported that the patient was due for a medication check appointment   - Patient is scheduled for an upcoming Annual Wellness Visit on 10/24/2023 with Dr. Flower     Appointments in Next Year      Oct 24, 2023  3:30 PM  (Arrive by 3:10 PM)  Annual Wellness Visit with Jodi Flower MD  [facility] ([facility] ) [phone]     Called the patient at [phone]   - Informed the patient that a 90 day supply of his mesalamine (ASACOL HD) 800 MG EC tablet, rosuvastatin (CRESTOR) 40 MG tablet, and ezetimibe (ZETIA) 10 MG tablet medications   - Patient states that he is receiving the last 30 day supply of these 3 medications, but states that it can take them up to 6 weeks to have the next supply ready, so patient is requesting his mesalamine (ASACOL HD) 800 MG EC tablet, rosuvastatin (CRESTOR) 40 MG tablet, and ezetimibe (ZETIA) 10 MG tablet medication prescriptions be faxed to the [facility] at [phone]   - Pended the patient's mesalamine (ASACOL HD) 800 MG EC tablet, rosuvastatin (CRESTOR) 40 MG tablet, and ezetimibe (ZETIA) 10 MG tablet medications with local print     Dr. Flower, please review and order as appropriate.   - Once ordered, please route back so that the orders can be faxed to the [facility] at [phone]    Lluvia TOLEDO RN   Patient Advocate Liaison (PAL)  [facility]   [phone]

## 2023-08-21 NOTE — TELEPHONE ENCOUNTER
Called the patient at [phone]   - Informed the patient that his mesalamine (ASACOL HD) 800 MG EC tablet, rosuvastatin (CRESTOR) 40 MG tablet, and ezetimibe (ZETIA) 10 MG tablet medication prescriptions have been faxed to the [facility] at [phone]   - Patient verbalized understanding and agrees with the plan     Lluvia TOLEDO RN   Patient Advocate Liaison (PAL)  [facility]   [phone]

## 2023-08-24 ENCOUNTER — ANTICOAGULATION THERAPY VISIT (OUTPATIENT)
Dept: ANTICOAGULATION | Facility: CLINIC | Age: 83
End: 2023-08-24

## 2023-08-24 ENCOUNTER — LAB (OUTPATIENT)
Dept: LAB | Facility: CLINIC | Age: 83
End: 2023-08-24
Payer: MEDICARE

## 2023-08-24 DIAGNOSIS — Z95.2 S/P MITRAL VALVE REPLACEMENT: ICD-10-CM

## 2023-08-24 DIAGNOSIS — Z95.2 S/P AORTIC VALVE REPLACEMENT: Primary | ICD-10-CM

## 2023-08-24 DIAGNOSIS — Z95.2 S/P AORTIC VALVE REPLACEMENT: ICD-10-CM

## 2023-08-24 DIAGNOSIS — I48.11 LONGSTANDING PERSISTENT ATRIAL FIBRILLATION (H): ICD-10-CM

## 2023-08-24 DIAGNOSIS — Z79.01 LONG TERM CURRENT USE OF ANTICOAGULANT THERAPY: ICD-10-CM

## 2023-08-24 LAB — INR BLD: 3.5 (ref 0.9–1.1)

## 2023-08-24 PROCEDURE — 85610 PROTHROMBIN TIME: CPT

## 2023-08-24 PROCEDURE — 36416 COLLJ CAPILLARY BLOOD SPEC: CPT

## 2023-08-24 NOTE — PROGRESS NOTES
"ANTICOAGULATION MANAGEMENT     Fausto Farr 83 year old male is on warfarin with therapeutic INR result. (Goal INR 2.5-3.5)    Recent labs: (last 7 days)     08/24/23  0904   INR 3.5*       ASSESSMENT     Source(s): Chart Review and Patient/Caregiver Call     Warfarin doses taken: Warfarin taken as instructed  Diet: No new diet changes identified-patient states he is still eating greens from his garden and that he is \"eating plenty of greens\".  Medication/supplement changes: Has been taking 2 tylenol per day for rib pain, but patient reports he did \"not take any last night\".  New illness, injury, or hospitalization: Yes: patient was seen in the urgency room on 8/11/23 for right sided rib pain after reaching into deep freezer. X-rays revealed 1 fractured rib on right side, patient states the pain has improved and mostly subsided.  Signs or symptoms of bleeding or clotting: No  Previous result: Therapeutic last visit; previously outside of goal range  Additional findings: 7/26/23--Warfarin maintenance dose was increased 5% at last visit  8/1/23 office visit for diabetes follow up, no change in care plan.       PLAN     Recommended plan for temporary change(s) affecting INR     Dosing Instructions: Continue your current warfarin dose with next INR in 3 weeks       Summary  As of 8/24/2023      Full warfarin instructions:  5 mg every Mon, Fri; 7.5 mg all other days   Next INR check:  9/14/2023               Telephone call with Ralph who verbalizes understanding and agrees to plan    Lab visit scheduled    Education provided:   Dietary considerations: importance of consistent vitamin K intake    Plan made per [facility] anticoagulation protocol    Aure Sampson, RN  Anticoagulation Clinic  8/24/2023    _______________________________________________________________________     Anticoagulation Episode Summary       Current INR goal:  2.5-3.5   TTR:  68.3 % (12 mo)   Target end date:  Indefinite   Send INR reminders to:  " ANTICOAG [facility]    Indications    S/P aortic valve replacement [Z95.2]  S/P mitral valve replacement [Z95.2]  Long term current use of anticoagulant therapy [Z79.01]  Longstanding persistent atrial fibrillation (H) [I48.11]             Comments:  Per 9/20/22 Cardio Note strict INR goal of 2.5-3.5. If INR falls below 2.5 at any time, needs to be bridged with Lovenox. consent to leave DVM for medical information and scheduling             Anticoagulation Care Providers       Provider Role Specialty Phone number    Jodi Flower Mai, MD Referring Internal Medicine - Pediatrics [phone]

## 2023-09-05 DIAGNOSIS — Z95.0 CARDIAC PACEMAKER IN SITU: Primary | ICD-10-CM

## 2023-09-06 ENCOUNTER — OFFICE VISIT (OUTPATIENT)
Dept: CARDIOLOGY | Facility: CLINIC | Age: 83
End: 2023-09-06
Attending: INTERNAL MEDICINE
Payer: MEDICARE

## 2023-09-06 VITALS
WEIGHT: 214.4 LBS | HEIGHT: 65 IN | SYSTOLIC BLOOD PRESSURE: 110 MMHG | OXYGEN SATURATION: 96 % | BODY MASS INDEX: 35.72 KG/M2 | HEART RATE: 60 BPM | DIASTOLIC BLOOD PRESSURE: 60 MMHG

## 2023-09-06 DIAGNOSIS — I44.2 COMPLETE ATRIOVENTRICULAR BLOCK (H): ICD-10-CM

## 2023-09-06 DIAGNOSIS — Z95.0 CARDIAC PACEMAKER IN SITU: ICD-10-CM

## 2023-09-06 DIAGNOSIS — Z95.2 HISTORY OF HEART VALVE REPLACEMENT WITH MECHANICAL VALVE: Primary | ICD-10-CM

## 2023-09-06 DIAGNOSIS — I25.10 CORONARY ARTERY DISEASE INVOLVING NATIVE CORONARY ARTERY OF NATIVE HEART WITHOUT ANGINA PECTORIS: ICD-10-CM

## 2023-09-06 DIAGNOSIS — I44.2 THIRD DEGREE HEART BLOCK BY ELECTROCARDIOGRAM (H): ICD-10-CM

## 2023-09-06 PROCEDURE — 99214 OFFICE O/P EST MOD 30 MIN: CPT | Performed by: INTERNAL MEDICINE

## 2023-09-06 PROCEDURE — 93280 PM DEVICE PROGR EVAL DUAL: CPT | Performed by: INTERNAL MEDICINE

## 2023-09-06 NOTE — LETTER
9/6/2023    Chris Flower MD  [address] [place] [address]    RE: Fausto Farr       Dear Colleague,     I had the pleasure of seeing Fausto Farr in the [facility].  HPI and Plan:     Fausto was seen today in the office accompanied by his wife  She was able to show him several of the details he could not remember.  He has mechanical valves in the aortic and mitral position the last echo was reviewed it was from more than a year ago it is working fine left ventricle and right ventricle were normal.  He has a previous history of atrial flutter flutter status post ablation we reviewed his pacemaker chart today the pacer is working well he is using the ventricular channel about 87% the time less on the atrial channel and there was no arrhythmias detected this time.  We reviewed his old heart catheterization his bypass graft to the LAD and bypass graft in the right coronary patent and the left circumflex had mild disease however that angiogram was several years ago.  Patient reports no anginal symptoms I do not think we need to do another stress test at this point.  Patient had remote vision loss presumably from a clot from one of his valves his INR is running between 2.5 and 3.5 has had no recurrence of that.    He has not seen Dr. Christensen but I do see a CAT scan of his abdomen less than a year ago that showed small stable endovascular leak and celiac artery ostium narrowed.  The patient reports no intestinal angina.  His exam is overweight his abdominal exam is a little difficult but no obvious abnormalities.  He has significant varicose veins in both legs.  His dorsal pedal pulses are reduced but he has no clinical leg claudication and his femoral pulses are 1-2+ without bruit.    At this point I have made no changes today's visit was a 40-minute visit to review previous echo review his heart catheterization reviewed his blood work his INR he has metabolic syndrome and  as such she has low HDL and high triglyceride we again talked about diet I will leave it to his family doctor if he should go on an SGLT2 medication or similar because of his heart disease and it may help correct his lipids understanding his hemoglobin A1c is still below 7%.  We will see him back next year for an echocardiogram and an office visit we think his primary care doctor is doing excellent job following him.  Office visit 45 minutes today  Orders Placed This Encounter   Procedures    Follow-Up with Cardiology    Echocardiogram Complete     No orders of the defined types were placed in this encounter.    There are no discontinued medications.      Encounter Diagnoses   Name Primary?    Third degree heart block s/p pacemaker     History of heart valve replacement with mechanical valve Yes    Coronary artery disease involving native coronary artery 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        CURRENT MEDICATIONS:  Current Outpatient Medications   Medication Sig Dispense Refill    acetaminophen (TYLENOL) 325 MG tablet Take 2 tablets (650 mg) by mouth every 6 hours as needed for mild pain or other (and adjunct with moderate or severe pain or per patient request) 100 tablet 0    amoxicillin-clavulanate (AUGMENTIN) 875-125 MG tablet Take 1 tablet by mouth 2 times daily 30 tablet 0    betamethasone valerate (VALISONE) 0.1 % external lotion Apply topically 2 times daily Apply topically to scalp twice daily PRN 60 mL 3    cholecalciferol 25 MCG (1000 UT) TABS Take 1,000 Units by mouth daily      ezetimibe (ZETIA) 10 MG tablet Take 1 tablet (10 mg) by mouth daily 90 tablet 0    ferrous sulfate (FEROSUL) 325 (65 Fe) MG tablet Take 325 mg by mouth daily (with breakfast)      fluocinonide (LIDEX) 0.05 % external ointment Apply topically 2 times daily 60 g 11    glucosamine-chondroitin 500-400 MG CAPS per capsule Take 1 capsule by mouth every evening      mesalamine (ASACOL HD) 800 MG EC tablet Take 1 tablet (800 mg) by  mouth 2 times daily 180 tablet 0    metoprolol succinate ER (TOPROL XL) 50 MG 24 hr tablet Take 1 1/2 tab (75mg) daily 135 tablet 1    rosuvastatin (CRESTOR) 40 MG tablet Take 1 tablet (40 mg) by mouth daily 90 tablet 0    tamsulosin (FLOMAX) 0.4 MG capsule Take 1 capsule (0.4 mg) by mouth daily 90 capsule 3    warfarin ANTICOAGULANT (COUMADIN) 5 MG tablet Take 1 tablet (5 mg) by mouth Monday, Wednesday, Friday and take 1.5 tablets (7.5 mg) all other days or as directed by INR clinic. 120 tablet 1    oxyCODONE (ROXICODONE) 5 MG tablet Take 1 tablet (5 mg) by mouth every 4 hours as needed for severe pain (7-10) (IF pain not managed with non-pharmacological and non-opioid interventions) (Patient not taking: Reported on 9/6/2023) 15 tablet 0    triamcinolone (KENALOG) 0.1 % external cream Apply topically 2 times daily (Patient not taking: Reported on 9/6/2023) 85.2 g 1       ALLERGIES     Allergies   Allergen Reactions    Bees Anaphylaxis       PAST MEDICAL HISTORY:  Past Medical History:   Diagnosis Date    Abdominal pain     Anemia     currently taking iron    Back pain     since 1980    BPH (benign prostatic hyperplasia)     Bruit     CAD (coronary artery disease)     Cellulitis 10/18/2012    Cellulitis 05/2018    GrpB strep LLE cellulitis  negative RACHAEL for veg    Chronic venous insufficiency     bilat lower extremities    Contact dermatitis and other eczema, due to unspecified cause     Diaphragmatic hernia without mention of obstruction or gangrene     Diastolic HF (heart failure) (H)     Gastric ulcer     Hypertension 08/06/2013    Lumbago     Mesenteric artery insufficiency (H)     known AAA and narrowing of intestinal artery's  followed by dr raymond    Metabolic syndrome     Mitral valve disease     s/p mechanical MVR, prior mech AVR    Nonallopathic lesion of lumbar region     Nonallopathic lesion of rib cage     Nonallopathic lesion of sacral region     GAGE (obstructive sleep apnea)     CPAP    Paroxysmal  atrial fibrillation (H) 10/18/2012    occured after stopping BB  (prior  aflutter ablation)    Prostate cancer (H) 2008    radiation seed, XRT     PVD (peripheral vascular disease) (H)     Rotator cuff strain     and sprain    S/P AAA repair     S/P aortic valve replacement 2006    developed perivalve leak and MS, therefore redo surg 2013    S/P CABG (coronary artery bypass graft) 2006    Lima-Lad, RA-Rca    Sciatica of left side     since 2000    Sepsis due to group B Streptococcus (H) 05/19/2018    TIA (transient ischemic attack) 8/1/2022    Total knee replacement status 7/22/2019    Ulcerative colitis (H)     Varicose veins of bilateral lower extremities with other complications     s/p RLE vein stripping    Vitamin D deficiency        PAST SURGICAL HISTORY:  Past Surgical History:   Procedure Laterality Date    AORTIC VALVE REPLACEMENT  1/3/06    redo AVR SJM 21mm and SJM MVR 27mm in 2013SJM 21([id number]):AVR, SJM 27 :MVR-    APPLY WOUND VAC N/A 11/12/2019    Procedure: APPLICATION, WOUND VAC;  Surgeon: Sara Martinez MD;  Location:  OR    ARTHROPLASTY KNEE      right knee    ARTHROPLASTY KNEE Right 7/22/2019    Procedure: Right total knee arthroplasty;  Surgeon: Prasanth Jensen MD;  Location:  OR    BACK SURGERY  Oct 2015    Fusion L4-5, laminectomy L2, L3    BYPASS GRAFT ARTERY CORONARY  10/2013    reimplantation of radial artery graft to RCA    CARDIAC CATHERIZATION  11/2005    Stent placed to RCA    CARDIAC CATHERIZATION  04/2013    Occluded RCA, patent LIMA to LAD and radial graft to PDA    CARPAL TUNNEL RELEASE RT/LT  1994    COLONOSCOPY  8-22-11    CYSTOSCOPY FLEXIBLE  10/16/2013    Procedure: CYSTOSCOPY FLEXIBLE;  FLEXIBLE CYSTOSCOPY / DILATION OF URETHRA / INSERTION OF LESLIE;  Surgeon: Cooper Wallace MD;  Location:  OR    ENDOVASCULAR REPAIR, INFRARENAL ABDOMINAL AORTIC ANEURYSM/DISSECTION; MODULAR BIFURCATED PROSTHESIS  2006    AAA repair endovascular    ENT SURGERY      EP  ABLATION ATRIAL FLUTTER N/A 4/22/2019    Procedure: EP Ablation Atrial Flutter;  Surgeon: Jessy Vasquez MD;  Location:  HEART CARDIAC CATH LAB    EP PACEMAKER DEVICE & LEAD IMPLANT- RIGHT ATRIAL & RIGHT VENTRICULAR N/A 8/2/2022    Procedure: Pacemaker Device & Lead Implant - Right Atrial & Right Ventricular;  Surgeon: Jessy Vasquez MD;  Location:  HEART CARDIAC CATH LAB    GENITOURINARY SURGERY  6/16/08    radioactive seeding    GRAFT FLAP PEDICLE EXTREMITY (LOCATION) Right 11/12/2019    Procedure: SURGICAL PROCUREMENT, FLAP, PEDICLE, EXTREMITY;  Surgeon: Sara Martinez MD;  Location:  OR    GRAFT SKIN SPLIT THICKNESS FROM EXTREMITY Right 11/12/2019    Procedure: RIGHT GASTRONEMIUS FLAP TO RIGHT KNEE, SPLIT THICKNESS SKIN GRAFT FROM RIGHT THIGH TO RIGHT KNEE, SURGICAL PROCUREMENT, FLAP, PEDICLE, EXTREMITY, WOUND VAC PLACEMENT;  Surgeon: Sara Martinez MD;  Location:  OR    HEAD & NECK SURGERY  1997    vocal cord polypectomy    INCISION AND DRAINAGE KNEE, COMBINED Right 8/29/2019    Procedure: INCISION AND DRAINAGE, KNEE W/ Secondary Wound Closure;  Surgeon: Prasanth Jensen MD;  Location:  OR    IRRIGATION AND DEBRIDEMENT KNEE, PLACE ANTIBIOTIC CEMENT BEADS / SPACE Right 2/12/2020    Procedure: 1. Irrigation and debridement of wound breakdown, right total knee arthroplasty.  2. Irrigation and debridement of right total knee with placement of antibiotic-impregnated (vancomycin) absorbable antibiotic beads.;  Surgeon: Prasanth Jensen MD;  Location:  OR    IRRIGATION AND DEBRIDEMENT LOWER EXTREMITY, COMBINED Right 12/8/2019    Procedure: DEBRIDEMENT OF RIGHT CALF AND WOUND CLOSURE.;  Surgeon: Sara Martinez MD;  Location:  OR    IRRIGATION AND DEBRIDEMENT UPPER EXTREMITY, COMBINED Right 1/7/2023    Procedure: Right elbow arthrotomy, irrigation and debridement;  Surgeon: Jose A Christine MD;  Location:  OR    KNEE SURGERY  2001 Right  knee arthroscopy    OPTICAL TRACKING SYSTEM FUSION SPINE POSTERIOR LUMBAR THREE+ LEVELS N/A 10/29/2015    Procedure: OPTICAL TRACKING SYSTEM FUSION SPINE POSTERIOR LUMBAR THREE+ LEVELS;  Surgeon: Walt Garcia MD;  Location:  OR    PROSTATE SURGERY      radioactive seeding 08    REPAIR ANEURYSM ABDOMINAL AORTA      REPAIR VALVE MITRAL  10/16/2013    SJM 21([id number]):AVR, SJM 27 :MVRProcedure: REPAIR VALVE MITRAL;  REDO STERNOTOMY/REDO AORTIC VALVE REPLACEMENT/ MITRAL VALVE REPLACEMENT/REIMPLANTATION OF RIGHT CORONARY ARTERY BYPASS WITH RACHAEL ( ON PUMP);  Surgeon: Viet Singh MD;  Location:  OR    REPLACE VALVE AORTIC  10/16/2013    Procedure: REPLACE VALVE AORTIC;;  Surgeon: Viet Singh MD;  Location:  OR    SURGERY GENERAL IP CONSULT  2008 Excision aneurysm abdominal aorta    SURGERY GENERAL IP CONSULT   Vocal cord polypectomy    VASCULAR SURGERY  1993     varicose vein stripping    ZZC CABG, VEIN, TWO  1/3/06    Left radial to RCA, LIMA to LAD (RA to RCA reimplanted at time of 2013 surg)       FAMILY HISTORY:  Family History   Problem Relation Age of Onset    No Known Problems Mother     Coronary Artery Disease Father         CABG    Heart Disease Father         Pacemaker    No Known Problems Brother     No Known Problems Sister     No Known Problems Son     Other Cancer Daughter     No Known Problems Daughter     Heart Disease Brother     Other - See Comments Grandchild        SOCIAL HISTORY:  Social History     Socioeconomic History    Marital status:      Spouse name: None    Number of children: None    Years of education: None    Highest education level: None   Tobacco Use    Smoking status: Former     Packs/day: 1.00     Years: 40.00     Pack years: 40.00     Types: Cigarettes     Start date: 4/15/1962     Quit date: 10/23/2002     Years since quittin.8    Smokeless tobacco: Never   Vaping Use    Vaping Use: Never used   Substance and  "Sexual Activity    Alcohol use: Yes     Comment: a couple beers per week (socially)    Drug use: No    Sexual activity: Not Currently     Partners: Female   Other Topics Concern    Parent/sibling w/ CABG, MI or angioplasty before 65F 55M? Yes     Comment: Brother had bypass at 55    Caffeine Concern No     Comment: 6-8 cups of half and half per day    Special Diet No    Exercise No       Review of Systems:  Skin:  not assessed     Eyes:  not assessed    ENT:  not assessed    Respiratory:  Positive for dyspnea on exertion;sleep apnea;CPAP  Cardiovascular:    Positive for;lightheadedness  Gastroenterology: not assessed    Genitourinary:  not assessed    Musculoskeletal:  not assessed    Neurologic:  not assessed    Psychiatric:  not assessed    Heme/Lymph/Imm:  not assessed    Endocrine:  not assessed      Physical Exam:  Vitals: /60 (BP Location: Right arm, Patient Position: Sitting, Cuff Size: Adult Regular)   Pulse 60   Ht 1.638 m (5' 4.5\")   Wt 97.3 kg (214 lb 6.4 oz)   SpO2 96%   BMI 36.23 kg/m      Constitutional:  cooperative, alert and oriented, well developed, well nourished, in no acute distress        Skin:  warm and dry to the touch, no apparent skin lesions or masses noted          Head:  normocephalic, no masses or lesions        Eyes:  pupils equal and round, conjunctivae and lids unremarkable, sclera white, no xanthalasma, EOMS intact, no nystagmus        Lymph:No Cervical lymphadenopathy present;No thyromegaly     ENT:           Neck:  carotid pulses are full and equal bilaterally, JVP normal, no carotid bruit        Respiratory:       rare crackles  at bases    Cardiac: regular rhythm occasional premature beats   crisp prosthetic valve sounds                                              no bruits, dec pulses to both feet but no claudication    GI:  abdomen soft, non-tender, BS normoactive, no mass, no HSM, no bruits obese      Extremities and Muscular Skeletal:  no edema   varicose vein    "       Neurological:  no gross motor deficits        Psych:  Alert and Oriented x 3      Recent Lab Results:  LIPID RESULTS:  Lab Results   Component Value Date    CHOL 122 08/01/2023    CHOL 152 06/01/2020    HDL 31 (L) 08/01/2023    HDL 34 (L) 06/01/2020    LDL 57 08/01/2023    LDL 90 06/01/2020    TRIG 172 (H) 08/01/2023    TRIG 138 06/01/2020    CHOLHDLRATIO 3.6 06/03/2015       LIVER ENZYME RESULTS:  Lab Results   Component Value Date    AST 33 08/01/2023    AST 19 06/01/2020    ALT 28 08/01/2023    ALT 16 06/01/2020       CBC RESULTS:  Lab Results   Component Value Date    WBC 8.8 01/17/2023    WBC 7.6 06/15/2020    RBC 4.55 01/17/2023    RBC 4.44 06/15/2020    HGB 13.2 (L) 01/17/2023    HGB 13.0 (L) 06/15/2020    HCT 41.6 01/17/2023    HCT 40.0 06/15/2020    MCV 91 01/17/2023    MCV 90 06/15/2020    MCH 29.0 01/17/2023    MCH 29.3 06/15/2020    MCHC 31.7 01/17/2023    MCHC 32.5 06/15/2020    RDW 12.9 01/17/2023    RDW 13.3 06/15/2020     01/17/2023     06/15/2020       BMP RESULTS:  Lab Results   Component Value Date     08/01/2023     06/01/2020    POTASSIUM 4.3 08/01/2023    POTASSIUM 4.0 08/03/2022    POTASSIUM 4.0 06/01/2020    CHLORIDE 104 08/01/2023    CHLORIDE 107 08/03/2022    CHLORIDE 106 06/01/2020    CO2 26 08/01/2023    CO2 27 08/03/2022    CO2 26 06/01/2020    ANIONGAP 10 08/01/2023    ANIONGAP 6 08/03/2022    ANIONGAP 4 06/01/2020     (H) 08/01/2023     (H) 01/08/2023     (H) 08/03/2022     (H) 06/01/2020    BUN 17.0 08/01/2023    BUN 15 08/03/2022    BUN 13 06/01/2020    CR 0.94 08/01/2023    CR 0.73 06/01/2020    GFRESTIMATED 81 08/01/2023    GFRESTIMATED >60 10/12/2022    GFRESTIMATED 72 10/06/2020    GFRESTBLACK 87 10/06/2020    AWILDA 9.5 08/01/2023    AWILDA 8.5 06/01/2020        A1C RESULTS:  Lab Results   Component Value Date    A1C 6.7 (H) 08/01/2023    A1C 5.9 04/25/2017       INR RESULTS:  Lab Results   Component Value Date    INR 3.5 (H)  08/24/2023    INR 3.2 (H) 08/01/2023    INR 1.70 (H) 01/10/2023    INR 1.37 (H) 01/09/2023    INR 2.00 (H) 07/08/2021    INR 1.80 (H) 06/17/2021           CC  Calderon Santo MD  [address]  [place] [address]      Thank you for allowing me to participate in the care of your patient.      Sincerely,     Calderon Santo MD     [facility]

## 2023-09-06 NOTE — PROGRESS NOTES
HPI and Plan:     Fausto was seen today in the office accompanied by his wife  She was able to show him several of the details he could not remember.  He has mechanical valves in the aortic and mitral position the last echo was reviewed it was from more than a year ago it is working fine left ventricle and right ventricle were normal.  He has a previous history of atrial flutter flutter status post ablation we reviewed his pacemaker chart today the pacer is working well he is using the ventricular channel about 87% the time less on the atrial channel and there was no arrhythmias detected this time.  We reviewed his old heart catheterization his bypass graft to the LAD and bypass graft in the right coronary patent and the left circumflex had mild disease however that angiogram was several years ago.  Patient reports no anginal symptoms I do not think we need to do another stress test at this point.  Patient had remote vision loss presumably from a clot from one of his valves his INR is running between 2.5 and 3.5 has had no recurrence of that.    He has not seen Dr. Christensen but I do see a CAT scan of his abdomen less than a year ago that showed small stable endovascular leak and celiac artery ostium narrowed.  The patient reports no intestinal angina.  His exam is overweight his abdominal exam is a little difficult but no obvious abnormalities.  He has significant varicose veins in both legs.  His dorsal pedal pulses are reduced but he has no clinical leg claudication and his femoral pulses are 1-2+ without bruit.    At this point I have made no changes today's visit was a 40-minute visit to review previous echo review his heart catheterization reviewed his blood work his INR he has metabolic syndrome and as such she has low HDL and high triglyceride we again talked about diet I will leave it to his family doctor if he should go on an SGLT2 medication or similar because of his heart disease and it may help correct  his lipids understanding his hemoglobin A1c is still below 7%.  We will see him back next year for an echocardiogram and an office visit we think his primary care doctor is doing excellent job following him.  Office visit 45 minutes today  Orders Placed This Encounter   Procedures    Follow-Up with Cardiology    Echocardiogram Complete     No orders of the defined types were placed in this encounter.    There are no discontinued medications.      Encounter Diagnoses   Name Primary?    Third degree heart block s/p pacemaker     History of heart valve replacement with mechanical valve Yes    Coronary artery disease involving native coronary artery 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        CURRENT MEDICATIONS:  Current Outpatient Medications   Medication Sig Dispense Refill    acetaminophen (TYLENOL) 325 MG tablet Take 2 tablets (650 mg) by mouth every 6 hours as needed for mild pain or other (and adjunct with moderate or severe pain or per patient request) 100 tablet 0    amoxicillin-clavulanate (AUGMENTIN) 875-125 MG tablet Take 1 tablet by mouth 2 times daily 30 tablet 0    betamethasone valerate (VALISONE) 0.1 % external lotion Apply topically 2 times daily Apply topically to scalp twice daily PRN 60 mL 3    cholecalciferol 25 MCG (1000 UT) TABS Take 1,000 Units by mouth daily      ezetimibe (ZETIA) 10 MG tablet Take 1 tablet (10 mg) by mouth daily 90 tablet 0    ferrous sulfate (FEROSUL) 325 (65 Fe) MG tablet Take 325 mg by mouth daily (with breakfast)      fluocinonide (LIDEX) 0.05 % external ointment Apply topically 2 times daily 60 g 11    glucosamine-chondroitin 500-400 MG CAPS per capsule Take 1 capsule by mouth every evening      mesalamine (ASACOL HD) 800 MG EC tablet Take 1 tablet (800 mg) by mouth 2 times daily 180 tablet 0    metoprolol succinate ER (TOPROL XL) 50 MG 24 hr tablet Take 1 1/2 tab (75mg) daily 135 tablet 1    rosuvastatin (CRESTOR) 40 MG tablet Take 1 tablet (40 mg) by mouth daily 90  tablet 0    tamsulosin (FLOMAX) 0.4 MG capsule Take 1 capsule (0.4 mg) by mouth daily 90 capsule 3    warfarin ANTICOAGULANT (COUMADIN) 5 MG tablet Take 1 tablet (5 mg) by mouth Monday, Wednesday, Friday and take 1.5 tablets (7.5 mg) all other days or as directed by INR clinic. 120 tablet 1    oxyCODONE (ROXICODONE) 5 MG tablet Take 1 tablet (5 mg) by mouth every 4 hours as needed for severe pain (7-10) (IF pain not managed with non-pharmacological and non-opioid interventions) (Patient not taking: Reported on 9/6/2023) 15 tablet 0    triamcinolone (KENALOG) 0.1 % external cream Apply topically 2 times daily (Patient not taking: Reported on 9/6/2023) 85.2 g 1       ALLERGIES     Allergies   Allergen Reactions    Bees Anaphylaxis       PAST MEDICAL HISTORY:  Past Medical History:   Diagnosis Date    Abdominal pain     Anemia     currently taking iron    Back pain     since 1980    BPH (benign prostatic hyperplasia)     Bruit     CAD (coronary artery disease)     Cellulitis 10/18/2012    Cellulitis 05/2018    GrpB strep LLE cellulitis  negative RACHAEL for veg    Chronic venous insufficiency     bilat lower extremities    Contact dermatitis and other eczema, due to unspecified cause     Diaphragmatic hernia without mention of obstruction or gangrene     Diastolic HF (heart failure) (H)     Gastric ulcer     Hypertension 08/06/2013    Lumbago     Mesenteric artery insufficiency (H)     known AAA and narrowing of intestinal artery's  followed by dr raymond    Metabolic syndrome     Mitral valve disease     s/p mechanical MVR, prior mech AVR    Nonallopathic lesion of lumbar region     Nonallopathic lesion of rib cage     Nonallopathic lesion of sacral region     GAGE (obstructive sleep apnea)     CPAP    Paroxysmal atrial fibrillation (H) 10/18/2012    occured after stopping BB  (prior  aflutter ablation)    Prostate cancer (H) 2008    radiation seed, XRT     PVD (peripheral vascular disease) (H)     Rotator cuff strain      and sprain    S/P AAA repair     S/P aortic valve replacement 2006    developed perivalve leak and MS, therefore redo surg 2013    S/P CABG (coronary artery bypass graft) 2006    Lima-Lad, RA-Rca    Sciatica of left side     since 2000    Sepsis due to group B Streptococcus (H) 05/19/2018    TIA (transient ischemic attack) 8/1/2022    Total knee replacement status 7/22/2019    Ulcerative colitis (H)     Varicose veins of bilateral lower extremities with other complications     s/p RLE vein stripping    Vitamin D deficiency        PAST SURGICAL HISTORY:  Past Surgical History:   Procedure Laterality Date    AORTIC VALVE REPLACEMENT  1/3/06    redo AVR SJM 21mm and SJM MVR 27mm in 2013SJM 21([id number]):AVR, SJM 27 :MVR-    APPLY WOUND VAC N/A 11/12/2019    Procedure: APPLICATION, WOUND VAC;  Surgeon: Sara Martinez MD;  Location:  OR    ARTHROPLASTY KNEE      right knee    ARTHROPLASTY KNEE Right 7/22/2019    Procedure: Right total knee arthroplasty;  Surgeon: Prasanth Jensen MD;  Location:  OR    BACK SURGERY  Oct 2015    Fusion L4-5, laminectomy L2, L3    BYPASS GRAFT ARTERY CORONARY  10/2013    reimplantation of radial artery graft to RCA    CARDIAC CATHERIZATION  11/2005    Stent placed to RCA    CARDIAC CATHERIZATION  04/2013    Occluded RCA, patent LIMA to LAD and radial graft to PDA    CARPAL TUNNEL RELEASE RT/LT  1994    COLONOSCOPY  8-22-11    CYSTOSCOPY FLEXIBLE  10/16/2013    Procedure: CYSTOSCOPY FLEXIBLE;  FLEXIBLE CYSTOSCOPY / DILATION OF URETHRA / INSERTION OF LESLIE;  Surgeon: Cooper Wallace MD;  Location:  OR    ENDOVASCULAR REPAIR, INFRARENAL ABDOMINAL AORTIC ANEURYSM/DISSECTION; MODULAR BIFURCATED PROSTHESIS  2006    AAA repair endovascular    ENT SURGERY      EP ABLATION ATRIAL FLUTTER N/A 4/22/2019    Procedure: EP Ablation Atrial Flutter;  Surgeon: Jessy Vasquez MD;  Location:  [facility]    EP PACEMAKER DEVICE & LEAD IMPLANT- RIGHT ATRIAL  & RIGHT VENTRICULAR N/A 8/2/2022    Procedure: Pacemaker Device & Lead Implant - Right Atrial & Right Ventricular;  Surgeon: Jessy Vasquez MD;  Location:  [facility]    GENITOURINARY SURGERY  6/16/08    radioactive seeding    GRAFT FLAP PEDICLE EXTREMITY (LOCATION) Right 11/12/2019    Procedure: SURGICAL PROCUREMENT, FLAP, PEDICLE, EXTREMITY;  Surgeon: Sara Martinez MD;  Location:  OR    GRAFT SKIN SPLIT THICKNESS FROM EXTREMITY Right 11/12/2019    Procedure: RIGHT GASTRONEMIUS FLAP TO RIGHT KNEE, SPLIT THICKNESS SKIN GRAFT FROM RIGHT THIGH TO RIGHT KNEE, SURGICAL PROCUREMENT, FLAP, PEDICLE, EXTREMITY, WOUND VAC PLACEMENT;  Surgeon: Sara Martinez MD;  Location:  OR    HEAD & NECK SURGERY  1997    vocal cord polypectomy    INCISION AND DRAINAGE KNEE, COMBINED Right 8/29/2019    Procedure: INCISION AND DRAINAGE, KNEE W/ Secondary Wound Closure;  Surgeon: Prasanth Jensen MD;  Location:  OR    IRRIGATION AND DEBRIDEMENT KNEE, PLACE ANTIBIOTIC CEMENT BEADS / SPACE Right 2/12/2020    Procedure: 1. Irrigation and debridement of wound breakdown, right total knee arthroplasty.  2. Irrigation and debridement of right total knee with placement of antibiotic-impregnated (vancomycin) absorbable antibiotic beads.;  Surgeon: Prasanth Jensen MD;  Location:  OR    IRRIGATION AND DEBRIDEMENT LOWER EXTREMITY, COMBINED Right 12/8/2019    Procedure: DEBRIDEMENT OF RIGHT CALF AND WOUND CLOSURE.;  Surgeon: Sara Martinez MD;  Location:  OR    IRRIGATION AND DEBRIDEMENT UPPER EXTREMITY, COMBINED Right 1/7/2023    Procedure: Right elbow arthrotomy, irrigation and debridement;  Surgeon: Jose A Christine MD;  Location:  OR    KNEE SURGERY  2001 Right knee arthroscopy    OPTICAL TRACKING SYSTEM FUSION SPINE POSTERIOR LUMBAR THREE+ LEVELS N/A 10/29/2015    Procedure: OPTICAL TRACKING SYSTEM FUSION SPINE POSTERIOR LUMBAR THREE+ LEVELS;  Surgeon: Walt Garcia MD;   Location: [facility]    PROSTATE SURGERY      radioactive seeding 08    REPAIR ANEURYSM ABDOMINAL AORTA      REPAIR VALVE MITRAL  10/16/2013    SJM 21(AGFN [id number]):AVR, SJM 27  501:MVRProcedure: REPAIR VALVE MITRAL;  REDO STERNOTOMY/REDO AORTIC VALVE REPLACEMENT/ MITRAL VALVE REPLACEMENT/REIMPLANTATION OF RIGHT CORONARY ARTERY BYPASS WITH RACHAEL ( ON PUMP);  Surgeon: Viet Singh MD;  Location: [facility]    REPLACE VALVE AORTIC  10/16/2013    Procedure: REPLACE VALVE AORTIC;;  Surgeon: Viet Singh MD;  Location: [facility]    SURGERY GENERAL IP CONSULT  2008 Excision aneurysm abdominal aorta    SURGERY GENERAL IP CONSULT   Vocal cord polypectomy    VASCULAR SURGERY  1993     varicose vein stripping    ZZC CABG, VEIN, TWO  1/3/06    Left radial to RCA, LIMA to LAD (RA to RCA reimplanted at time of 2013 surg)       FAMILY HISTORY:  Family History   Problem Relation Age of Onset    No Known Problems Mother     Coronary Artery Disease Father         CABG    Heart Disease Father         Pacemaker    No Known Problems Brother     No Known Problems Sister     No Known Problems Son     Other Cancer Daughter     No Known Problems Daughter     Heart Disease Brother     Other - See Comments Grandchild        SOCIAL HISTORY:  Social History     Socioeconomic History    Marital status:      Spouse name: None    Number of children: None    Years of education: None    Highest education level: None   Tobacco Use    Smoking status: Former     Packs/day: 1.00     Years: 40.00     Pack years: 40.00     Types: Cigarettes     Start date: 4/15/1962     Quit date: 10/23/2002     Years since quittin.8    Smokeless tobacco: Never   Vaping Use    Vaping Use: Never used   Substance and Sexual Activity    Alcohol use: Yes     Comment: a couple beers per week (socially)    Drug use: No    Sexual activity: Not Currently     Partners: Female   Other Topics Concern    Parent/sibling w/ CABG, MI or  "angioplasty before 65F 55M? Yes     Comment: Brother had bypass at 55    Caffeine Concern No     Comment: 6-8 cups of half and half per day    Special Diet No    Exercise No       Review of Systems:  Skin:  not assessed     Eyes:  not assessed    ENT:  not assessed    Respiratory:  Positive for dyspnea on exertion;sleep apnea;CPAP  Cardiovascular:    Positive for;lightheadedness  Gastroenterology: not assessed    Genitourinary:  not assessed    Musculoskeletal:  not assessed    Neurologic:  not assessed    Psychiatric:  not assessed    Heme/Lymph/Imm:  not assessed    Endocrine:  not assessed      Physical Exam:  Vitals: /60 (BP Location: Right arm, Patient Position: Sitting, Cuff Size: Adult Regular)   Pulse 60   Ht 1.638 m (5' 4.5\")   Wt 97.3 kg (214 lb 6.4 oz)   SpO2 96%   BMI 36.23 kg/m      Constitutional:  cooperative, alert and oriented, well developed, well nourished, in no acute distress        Skin:  warm and dry to the touch, no apparent skin lesions or masses noted          Head:  normocephalic, no masses or lesions        Eyes:  pupils equal and round, conjunctivae and lids unremarkable, sclera white, no xanthalasma, EOMS intact, no nystagmus        Lymph:No Cervical lymphadenopathy present;No thyromegaly     ENT:           Neck:  carotid pulses are full and equal bilaterally, JVP normal, no carotid bruit        Respiratory:       rare crackles  at bases    Cardiac: regular rhythm occasional premature beats   crisp prosthetic valve sounds                                              no bruits, dec pulses to both feet but no claudication    GI:  abdomen soft, non-tender, BS normoactive, no mass, no HSM, no bruits obese      Extremities and Muscular Skeletal:  no edema   varicose vein          Neurological:  no gross motor deficits        Psych:  Alert and Oriented x 3      Recent Lab Results:  LIPID RESULTS:  Lab Results   Component Value Date    CHOL 122 08/01/2023    CHOL 152 06/01/2020    " HDL 31 (L) 08/01/2023    HDL 34 (L) 06/01/2020    LDL 57 08/01/2023    LDL 90 06/01/2020    TRIG 172 (H) 08/01/2023    TRIG 138 06/01/2020    CHOLHDLRATIO 3.6 06/03/2015       LIVER ENZYME RESULTS:  Lab Results   Component Value Date    AST 33 08/01/2023    AST 19 06/01/2020    ALT 28 08/01/2023    ALT 16 06/01/2020       CBC RESULTS:  Lab Results   Component Value Date    WBC 8.8 01/17/2023    WBC 7.6 06/15/2020    RBC 4.55 01/17/2023    RBC 4.44 06/15/2020    HGB 13.2 (L) 01/17/2023    HGB 13.0 (L) 06/15/2020    HCT 41.6 01/17/2023    HCT 40.0 06/15/2020    MCV 91 01/17/2023    MCV 90 06/15/2020    MCH 29.0 01/17/2023    MCH 29.3 06/15/2020    MCHC 31.7 01/17/2023    MCHC 32.5 06/15/2020    RDW 12.9 01/17/2023    RDW 13.3 06/15/2020     01/17/2023     06/15/2020       BMP RESULTS:  Lab Results   Component Value Date     08/01/2023     06/01/2020    POTASSIUM 4.3 08/01/2023    POTASSIUM 4.0 08/03/2022    POTASSIUM 4.0 06/01/2020    CHLORIDE 104 08/01/2023    CHLORIDE 107 08/03/2022    CHLORIDE 106 06/01/2020    CO2 26 08/01/2023    CO2 27 08/03/2022    CO2 26 06/01/2020    ANIONGAP 10 08/01/2023    ANIONGAP 6 08/03/2022    ANIONGAP 4 06/01/2020     (H) 08/01/2023     (H) 01/08/2023     (H) 08/03/2022     (H) 06/01/2020    BUN 17.0 08/01/2023    BUN 15 08/03/2022    BUN 13 06/01/2020    CR 0.94 08/01/2023    CR 0.73 06/01/2020    GFRESTIMATED 81 08/01/2023    GFRESTIMATED >60 10/12/2022    GFRESTIMATED 72 10/06/2020    GFRESTBLACK 87 10/06/2020    AWILDA 9.5 08/01/2023    AWILDA 8.5 06/01/2020        A1C RESULTS:  Lab Results   Component Value Date    A1C 6.7 (H) 08/01/2023    A1C 5.9 04/25/2017       INR RESULTS:  Lab Results   Component Value Date    INR 3.5 (H) 08/24/2023    INR 3.2 (H) 08/01/2023    INR 1.70 (H) 01/10/2023    INR 1.37 (H) 01/09/2023    INR 2.00 (H) 07/08/2021    INR 1.80 (H) 06/17/2021           CC  Calderon Santo MD  [id number]  [id number]  [place] [id number]

## 2023-09-08 LAB
MDC_IDC_LEAD_IMPLANT_DT: NORMAL
MDC_IDC_LEAD_IMPLANT_DT: NORMAL
MDC_IDC_LEAD_LOCATION: NORMAL
MDC_IDC_LEAD_LOCATION: NORMAL
MDC_IDC_LEAD_LOCATION_DETAIL_1: NORMAL
MDC_IDC_LEAD_LOCATION_DETAIL_1: NORMAL
MDC_IDC_LEAD_MFG: NORMAL
MDC_IDC_LEAD_MFG: NORMAL
MDC_IDC_LEAD_MODEL: NORMAL
MDC_IDC_LEAD_MODEL: NORMAL
MDC_IDC_LEAD_POLARITY_TYPE: NORMAL
MDC_IDC_LEAD_POLARITY_TYPE: NORMAL
MDC_IDC_LEAD_SERIAL: NORMAL
MDC_IDC_LEAD_SERIAL: NORMAL
MDC_IDC_MSMT_BATTERY_DTM: NORMAL
MDC_IDC_MSMT_BATTERY_REMAINING_LONGEVITY: 150 MO
MDC_IDC_MSMT_BATTERY_REMAINING_PERCENTAGE: 100 %
MDC_IDC_MSMT_BATTERY_STATUS: NORMAL
MDC_IDC_MSMT_LEADCHNL_RA_IMPEDANCE_VALUE: 706 OHM
MDC_IDC_MSMT_LEADCHNL_RA_PACING_THRESHOLD_AMPLITUDE: 0.7 V
MDC_IDC_MSMT_LEADCHNL_RA_PACING_THRESHOLD_PULSEWIDTH: 0.4 MS
MDC_IDC_MSMT_LEADCHNL_RV_IMPEDANCE_VALUE: 586 OHM
MDC_IDC_MSMT_LEADCHNL_RV_PACING_THRESHOLD_AMPLITUDE: 0.8 V
MDC_IDC_MSMT_LEADCHNL_RV_PACING_THRESHOLD_PULSEWIDTH: 0.4 MS
MDC_IDC_PG_IMPLANT_DTM: NORMAL
MDC_IDC_PG_MFG: NORMAL
MDC_IDC_PG_MODEL: NORMAL
MDC_IDC_PG_SERIAL: NORMAL
MDC_IDC_PG_TYPE: NORMAL
MDC_IDC_SESS_CLINIC_NAME: NORMAL
MDC_IDC_SESS_DTM: NORMAL
MDC_IDC_SESS_TYPE: NORMAL
MDC_IDC_SET_BRADY_AT_MODE_SWITCH_MODE: NORMAL
MDC_IDC_SET_BRADY_AT_MODE_SWITCH_RATE: 170 {BEATS}/MIN
MDC_IDC_SET_BRADY_LOWRATE: 60 {BEATS}/MIN
MDC_IDC_SET_BRADY_MAX_SENSOR_RATE: 130 {BEATS}/MIN
MDC_IDC_SET_BRADY_MAX_TRACKING_RATE: 130 {BEATS}/MIN
MDC_IDC_SET_BRADY_MODE: NORMAL
MDC_IDC_SET_BRADY_PAV_DELAY_HIGH: 200 MS
MDC_IDC_SET_BRADY_PAV_DELAY_LOW: 250 MS
MDC_IDC_SET_BRADY_SAV_DELAY_HIGH: 200 MS
MDC_IDC_SET_BRADY_SAV_DELAY_LOW: 250 MS
MDC_IDC_SET_LEADCHNL_RA_PACING_AMPLITUDE: 2 V
MDC_IDC_SET_LEADCHNL_RA_PACING_CAPTURE_MODE: NORMAL
MDC_IDC_SET_LEADCHNL_RA_PACING_POLARITY: NORMAL
MDC_IDC_SET_LEADCHNL_RA_PACING_PULSEWIDTH: 0.4 MS
MDC_IDC_SET_LEADCHNL_RA_SENSING_ADAPTATION_MODE: NORMAL
MDC_IDC_SET_LEADCHNL_RA_SENSING_POLARITY: NORMAL
MDC_IDC_SET_LEADCHNL_RA_SENSING_SENSITIVITY: 0.25 MV
MDC_IDC_SET_LEADCHNL_RV_PACING_AMPLITUDE: 1.5 V
MDC_IDC_SET_LEADCHNL_RV_PACING_CAPTURE_MODE: NORMAL
MDC_IDC_SET_LEADCHNL_RV_PACING_POLARITY: NORMAL
MDC_IDC_SET_LEADCHNL_RV_PACING_PULSEWIDTH: 0.4 MS
MDC_IDC_SET_LEADCHNL_RV_SENSING_ADAPTATION_MODE: NORMAL
MDC_IDC_SET_LEADCHNL_RV_SENSING_POLARITY: NORMAL
MDC_IDC_SET_LEADCHNL_RV_SENSING_SENSITIVITY: 0.6 MV
MDC_IDC_SET_ZONE_DETECTION_INTERVAL: 375 MS
MDC_IDC_SET_ZONE_TYPE: NORMAL
MDC_IDC_SET_ZONE_VENDOR_TYPE: NORMAL
MDC_IDC_STAT_AT_BURDEN_PERCENT: 1 %
MDC_IDC_STAT_AT_DTM_END: NORMAL
MDC_IDC_STAT_AT_DTM_START: NORMAL
MDC_IDC_STAT_BRADY_DTM_END: NORMAL
MDC_IDC_STAT_BRADY_DTM_START: NORMAL
MDC_IDC_STAT_BRADY_RA_PERCENT_PACED: 16 %
MDC_IDC_STAT_BRADY_RV_PERCENT_PACED: 87 %
MDC_IDC_STAT_EPISODE_RECENT_COUNT: 0
MDC_IDC_STAT_EPISODE_RECENT_COUNT: 15
MDC_IDC_STAT_EPISODE_RECENT_COUNT: 7
MDC_IDC_STAT_EPISODE_RECENT_COUNT_DTM_END: NORMAL
MDC_IDC_STAT_EPISODE_RECENT_COUNT_DTM_START: NORMAL
MDC_IDC_STAT_EPISODE_TYPE: NORMAL
MDC_IDC_STAT_EPISODE_VENDOR_TYPE: NORMAL

## 2023-09-14 ENCOUNTER — LAB (OUTPATIENT)
Dept: LAB | Facility: CLINIC | Age: 83
End: 2023-09-14
Payer: MEDICARE

## 2023-09-14 ENCOUNTER — ANTICOAGULATION THERAPY VISIT (OUTPATIENT)
Dept: ANTICOAGULATION | Facility: CLINIC | Age: 83
End: 2023-09-14

## 2023-09-14 DIAGNOSIS — Z95.2 S/P AORTIC VALVE REPLACEMENT: ICD-10-CM

## 2023-09-14 DIAGNOSIS — I48.11 LONGSTANDING PERSISTENT ATRIAL FIBRILLATION (H): ICD-10-CM

## 2023-09-14 DIAGNOSIS — Z95.2 S/P AORTIC VALVE REPLACEMENT: Primary | ICD-10-CM

## 2023-09-14 DIAGNOSIS — Z79.01 LONG TERM CURRENT USE OF ANTICOAGULANT THERAPY: ICD-10-CM

## 2023-09-14 DIAGNOSIS — Z95.2 S/P MITRAL VALVE REPLACEMENT: ICD-10-CM

## 2023-09-14 LAB — INR BLD: 2.7 (ref 0.9–1.1)

## 2023-09-14 PROCEDURE — 85610 PROTHROMBIN TIME: CPT

## 2023-09-14 PROCEDURE — 36416 COLLJ CAPILLARY BLOOD SPEC: CPT

## 2023-09-14 NOTE — PROGRESS NOTES
ANTICOAGULATION MANAGEMENT     Fausto Farr 83 year old male is on warfarin with therapeutic INR result. (Goal INR 2.5-3.5)    Recent labs: (last 7 days)     09/14/23  0917   INR 2.7*       ASSESSMENT     Source(s): Chart Review  Previous INR was Therapeutic last 2(+) visits  Medication, diet, health changes since last INR chart reviewed; none identified  Cardiology appointment 9/6/23.  No changes noted to care plan.         PLAN     Recommended plan for no diet, medication or health factor changes affecting INR     Dosing Instructions: Continue your current warfarin dose with next INR in 4 weeks       Summary  As of 9/14/2023      Full warfarin instructions:  5 mg every Mon, Fri; 7.5 mg all other days   Next INR check:  10/12/2023               Detailed voice message left for Ralph with dosing instructions and follow up date.   Sent Shout message with dosing and follow up instructions    Contact [phone]  to schedule and with any changes, questions or concerns.     Education provided:   Please call back if any changes to your diet, medications or how you've been taking warfarin    Plan made per [facility] anticoagulation protocol    Ruthann Sanders RN  Anticoagulation Clinic  9/14/2023    _______________________________________________________________________     Anticoagulation Episode Summary       Current INR goal:  2.5-3.5   TTR:  70.7 % (12 mo)   Target end date:  Indefinite   Send INR reminders to:  [facility]    Indications    S/P aortic valve replacement [Z95.2]  S/P mitral valve replacement [Z95.2]  Long term current use of anticoagulant therapy [Z79.01]  Longstanding persistent atrial fibrillation (H) [I48.11]             Comments:  Per 9/20/22 Cardio Note strict INR goal of 2.5-3.5. If INR falls below 2.5 at any time, needs to be bridged with Lovenox. consent to leave DVM for medical information and scheduling             Anticoagulation Care Providers       Provider Role Specialty Phone number     Jodi Flower Mai, MD Referring Internal Medicine - Pediatrics [phone]

## 2023-10-12 ENCOUNTER — LAB (OUTPATIENT)
Dept: LAB | Facility: CLINIC | Age: 83
End: 2023-10-12
Payer: MEDICARE

## 2023-10-12 ENCOUNTER — ANTICOAGULATION THERAPY VISIT (OUTPATIENT)
Dept: ANTICOAGULATION | Facility: CLINIC | Age: 83
End: 2023-10-12

## 2023-10-12 DIAGNOSIS — I48.11 LONGSTANDING PERSISTENT ATRIAL FIBRILLATION (H): ICD-10-CM

## 2023-10-12 DIAGNOSIS — Z95.2 S/P MITRAL VALVE REPLACEMENT: ICD-10-CM

## 2023-10-12 DIAGNOSIS — Z95.2 S/P AORTIC VALVE REPLACEMENT: ICD-10-CM

## 2023-10-12 DIAGNOSIS — Z95.2 S/P AORTIC VALVE REPLACEMENT: Primary | ICD-10-CM

## 2023-10-12 DIAGNOSIS — Z79.01 LONG TERM CURRENT USE OF ANTICOAGULANT THERAPY: ICD-10-CM

## 2023-10-12 DIAGNOSIS — E11.9 DIABETES MELLITUS, TYPE 2 (H): ICD-10-CM

## 2023-10-12 LAB
INR BLD: 4.3 (ref 0.9–1.1)
TSH SERPL DL<=0.005 MIU/L-ACNC: 1.94 UIU/ML (ref 0.3–4.2)

## 2023-10-12 PROCEDURE — 84443 ASSAY THYROID STIM HORMONE: CPT

## 2023-10-12 PROCEDURE — 36415 COLL VENOUS BLD VENIPUNCTURE: CPT

## 2023-10-12 PROCEDURE — 85610 PROTHROMBIN TIME: CPT

## 2023-10-12 NOTE — PROGRESS NOTES
ANTICOAGULATION MANAGEMENT     Fausto Farr 83 year old male is on warfarin with supratherapeutic INR result. (Goal INR 2.5-3.5)    Recent labs: (last 7 days)     10/12/23  0929   INR 4.3*       ASSESSMENT     Source(s): Chart Review  Previous INR was Therapeutic last 2(+) visits  Medication, diet, health changes since last INR chart reviewed; none identified         PLAN     Unable to reach Ralph today.    No instructions provided. Unable to leave voicemail. Patient's wife answered the phone.  Patient was outside at the time of the call.  Patient will call back today when he is able.    Follow up required to confirm warfarin dose taken and assess for changes    Ruthann Sanders RN  Anticoagulation Clinic  10/12/2023

## 2023-10-12 NOTE — PROGRESS NOTES
ANTICOAGULATION MANAGEMENT     Fausto Farr 83 year old male is on warfarin with supratherapeutic INR result. (Goal INR 2.5-3.5)    Recent labs: (last 7 days)     10/12/23  0929   INR 4.3*       ASSESSMENT     Source(s): Chart Review and Patient/Caregiver Call     Warfarin doses taken: Patient has been taking less warfairn than prescribed.  He has been taking 5 mg of warfarin on Wednesdays instead of 7.5 mg.  Diet: Decreased greens/vitamin K in diet; ongoing change.  Was eating more veggies during the summer since he has a garden.  Medication/supplement changes: None noted  New illness, injury, or hospitalization: No  Signs or symptoms of bleeding or clotting: No  Previous result: Therapeutic last 2(+) visits  Additional findings: None       PLAN     Recommended plan for ongoing change(s) affecting INR     Dosing Instructions: partial hold then decrease your warfarin dose (10% change) with next INR in 10 days       Summary  As of 10/12/2023      Full warfarin instructions:  10/12: 2.5 mg; Otherwise 7.5 mg every Sun, Thu; 5 mg all other days   Next INR check:  10/24/2023               Telephone call with Ralph who agrees to plan and repeated back plan correctly    Check at provider office visit    Education provided:   Please call back if any changes to your diet, medications or how you've been taking warfarin    Plan made per [facility] anticoagulation protocol    Ruthann Sanders RN  Anticoagulation Clinic  10/12/2023    _______________________________________________________________________     Anticoagulation Episode Summary       Current INR goal:  2.5-3.5   TTR:  66.8% (12 mo)   Target end date:  Indefinite   Send INR reminders to:  [facility]    Indications    S/P aortic valve replacement [Z95.2]  S/P mitral valve replacement [Z95.2]  Long term current use of anticoagulant therapy [Z79.01]  Longstanding persistent atrial fibrillation (H) [I48.11]             Comments:  Per 9/20/22 Cardio Note strict INR goal  of 2.5-3.5. If INR falls below 2.5 at any time, needs to be bridged with Lovenox. consent to leave DVM for medical information and scheduling             Anticoagulation Care Providers       Provider Role Specialty Phone number    Jodi Flower Mai, MD Referring Internal Medicine - Pediatrics [phone]

## 2023-10-12 NOTE — RESULT ENCOUNTER NOTE
Dear Ralph,    Your lab results (thyroid) are normal.  At this time, I do not recommend any changes to your current plan of care.    Please feel free to call with any questions.  Otherwise, we can discuss further at your next appointment.    Sincerely,    Chris Flower MD

## 2023-10-19 ENCOUNTER — TELEPHONE (OUTPATIENT)
Dept: OTHER | Facility: CLINIC | Age: 83
End: 2023-10-19
Payer: MEDICARE

## 2023-10-19 NOTE — TELEPHONE ENCOUNTER
[facility]/ Dr. Amaro reached out to patient to schedule CTA abdomen/pelvis    Patient declined follow up.  Will route to PCP    Yesy Rao RN  IR nurse clinician  [phone]

## 2023-10-23 DIAGNOSIS — R33.9 URINARY RETENTION: ICD-10-CM

## 2023-10-24 ENCOUNTER — OFFICE VISIT (OUTPATIENT)
Dept: PEDIATRICS | Facility: CLINIC | Age: 83
End: 2023-10-24
Payer: MEDICARE

## 2023-10-24 ENCOUNTER — ANTICOAGULATION THERAPY VISIT (OUTPATIENT)
Dept: ANTICOAGULATION | Facility: CLINIC | Age: 83
End: 2023-10-24

## 2023-10-24 ENCOUNTER — LAB (OUTPATIENT)
Dept: LAB | Facility: CLINIC | Age: 83
End: 2023-10-24
Payer: MEDICARE

## 2023-10-24 VITALS
WEIGHT: 217.4 LBS | SYSTOLIC BLOOD PRESSURE: 133 MMHG | OXYGEN SATURATION: 98 % | DIASTOLIC BLOOD PRESSURE: 69 MMHG | TEMPERATURE: 97.8 F | HEART RATE: 65 BPM | BODY MASS INDEX: 34.94 KG/M2 | RESPIRATION RATE: 24 BRPM | HEIGHT: 66 IN

## 2023-10-24 DIAGNOSIS — K51.20 ULCERATIVE PROCTITIS WITHOUT COMPLICATION (H): ICD-10-CM

## 2023-10-24 DIAGNOSIS — Z95.2 S/P AORTIC VALVE REPLACEMENT: Primary | ICD-10-CM

## 2023-10-24 DIAGNOSIS — I47.29 NSVT (NONSUSTAINED VENTRICULAR TACHYCARDIA) (H): ICD-10-CM

## 2023-10-24 DIAGNOSIS — I50.32 CHRONIC DIASTOLIC CONGESTIVE HEART FAILURE (H): ICD-10-CM

## 2023-10-24 DIAGNOSIS — Z79.01 LONG TERM CURRENT USE OF ANTICOAGULANT THERAPY: ICD-10-CM

## 2023-10-24 DIAGNOSIS — E66.01 MORBID OBESITY (H): ICD-10-CM

## 2023-10-24 DIAGNOSIS — I48.11 LONGSTANDING PERSISTENT ATRIAL FIBRILLATION (H): ICD-10-CM

## 2023-10-24 DIAGNOSIS — Z00.00 ENCOUNTER FOR MEDICARE ANNUAL WELLNESS EXAM: Primary | ICD-10-CM

## 2023-10-24 DIAGNOSIS — I73.9 PVD (PERIPHERAL VASCULAR DISEASE) (H): ICD-10-CM

## 2023-10-24 DIAGNOSIS — E78.2 MIXED HYPERLIPIDEMIA: ICD-10-CM

## 2023-10-24 DIAGNOSIS — Z95.2 S/P MITRAL VALVE REPLACEMENT: ICD-10-CM

## 2023-10-24 DIAGNOSIS — Z95.2 S/P AORTIC VALVE REPLACEMENT: ICD-10-CM

## 2023-10-24 DIAGNOSIS — C61 MALIGNANT NEOPLASM OF PROSTATE (H): ICD-10-CM

## 2023-10-24 DIAGNOSIS — E11.9 TYPE 2 DIABETES MELLITUS WITHOUT COMPLICATION, WITHOUT LONG-TERM CURRENT USE OF INSULIN (H): ICD-10-CM

## 2023-10-24 DIAGNOSIS — I25.10 CORONARY ARTERY DISEASE WITHOUT ANGINA PECTORIS, UNSPECIFIED VESSEL OR LESION TYPE, UNSPECIFIED WHETHER NATIVE OR TRANSPLANTED HEART: ICD-10-CM

## 2023-10-24 DIAGNOSIS — R33.9 URINARY RETENTION: ICD-10-CM

## 2023-10-24 DIAGNOSIS — I83.90 VARICOSE VEINS OF CALF: ICD-10-CM

## 2023-10-24 DIAGNOSIS — R21 RASH: ICD-10-CM

## 2023-10-24 LAB — INR BLD: 2.4 (ref 0.9–1.1)

## 2023-10-24 PROCEDURE — 90662 IIV NO PRSV INCREASED AG IM: CPT | Performed by: INTERNAL MEDICINE

## 2023-10-24 PROCEDURE — G0439 PPPS, SUBSEQ VISIT: HCPCS | Performed by: INTERNAL MEDICINE

## 2023-10-24 PROCEDURE — 99207 PR FOOT EXAM NO CHARGE: CPT | Mod: 25 | Performed by: INTERNAL MEDICINE

## 2023-10-24 PROCEDURE — 85610 PROTHROMBIN TIME: CPT

## 2023-10-24 PROCEDURE — 91320 SARSCV2 VAC 30MCG TRS-SUC IM: CPT | Performed by: INTERNAL MEDICINE

## 2023-10-24 PROCEDURE — G0008 ADMIN INFLUENZA VIRUS VAC: HCPCS | Mod: 59 | Performed by: INTERNAL MEDICINE

## 2023-10-24 PROCEDURE — 36416 COLLJ CAPILLARY BLOOD SPEC: CPT

## 2023-10-24 PROCEDURE — 90480 ADMN SARSCOV2 VAC 1/ONLY CMP: CPT | Performed by: INTERNAL MEDICINE

## 2023-10-24 PROCEDURE — 99214 OFFICE O/P EST MOD 30 MIN: CPT | Mod: 25 | Performed by: INTERNAL MEDICINE

## 2023-10-24 RX ORDER — HYDROCODONE BITARTRATE AND ACETAMINOPHEN 5; 325 MG/1; MG/1
1 TABLET ORAL
COMMUNITY
Start: 2023-08-11 | End: 2023-10-24

## 2023-10-24 RX ORDER — METOPROLOL SUCCINATE 50 MG/1
TABLET, EXTENDED RELEASE ORAL
Qty: 135 TABLET | Refills: 3 | Status: SHIPPED | OUTPATIENT
Start: 2023-10-24

## 2023-10-24 RX ORDER — TAMSULOSIN HYDROCHLORIDE 0.4 MG/1
0.4 CAPSULE ORAL DAILY
Qty: 90 CAPSULE | Refills: 3 | Status: SHIPPED | OUTPATIENT
Start: 2023-10-24 | End: 2023-10-24

## 2023-10-24 RX ORDER — FLUOCINONIDE 0.5 MG/G
OINTMENT TOPICAL 2 TIMES DAILY
Qty: 60 G | Refills: 11 | Status: SHIPPED | OUTPATIENT
Start: 2023-10-24

## 2023-10-24 RX ORDER — RESPIRATORY SYNCYTIAL VIRUS VACCINE 120MCG/0.5
0.5 KIT INTRAMUSCULAR ONCE
Qty: 1 EACH | Refills: 0 | Status: CANCELLED | OUTPATIENT
Start: 2023-10-24 | End: 2023-10-24

## 2023-10-24 RX ORDER — EZETIMIBE 10 MG/1
10 TABLET ORAL DAILY
Qty: 100 TABLET | Refills: 3 | Status: SHIPPED | OUTPATIENT
Start: 2023-10-24

## 2023-10-24 RX ORDER — TAMSULOSIN HYDROCHLORIDE 0.4 MG/1
0.4 CAPSULE ORAL DAILY
Qty: 90 CAPSULE | Refills: 3 | Status: SHIPPED | OUTPATIENT
Start: 2023-10-24

## 2023-10-24 RX ORDER — ROSUVASTATIN CALCIUM 40 MG/1
40 TABLET, COATED ORAL DAILY
Qty: 100 TABLET | Refills: 3 | Status: SHIPPED | OUTPATIENT
Start: 2023-10-24

## 2023-10-24 RX ORDER — MESALAMINE 800 MG/1
1 TABLET, DELAYED RELEASE ORAL 2 TIMES DAILY
Qty: 200 TABLET | Refills: 3 | Status: SHIPPED | OUTPATIENT
Start: 2023-10-24

## 2023-10-24 ASSESSMENT — ENCOUNTER SYMPTOMS
DIZZINESS: 0
DIARRHEA: 0
JOINT SWELLING: 0
ARTHRALGIAS: 0
CHILLS: 0
FEVER: 0
HEADACHES: 0
PALPITATIONS: 0
PARESTHESIAS: 0
COUGH: 1
HEMATOCHEZIA: 0
FREQUENCY: 1
MYALGIAS: 0
DYSURIA: 0
WEAKNESS: 1
ABDOMINAL PAIN: 0
NERVOUS/ANXIOUS: 0
NAUSEA: 0
HEMATURIA: 0
SORE THROAT: 0
CONSTIPATION: 0
HEARTBURN: 0
EYE PAIN: 0
SHORTNESS OF BREATH: 1

## 2023-10-24 ASSESSMENT — PAIN SCALES - GENERAL: PAINLEVEL: MILD PAIN (2)

## 2023-10-24 ASSESSMENT — ACTIVITIES OF DAILY LIVING (ADL): CURRENT_FUNCTION: NO ASSISTANCE NEEDED

## 2023-10-24 NOTE — PATIENT INSTRUCTIONS
Refilled medications  No labs today  Check A1C around Feb/March 2024  Referral provided to vein doctor  Use a non-scented moisturizer on your feet to prevent rash (I like Aveeno)  Flu and COVID today  You can get the RSV vaccine at a pharmacy    Patient Education   Personalized Prevention Plan  You are due for the preventive services outlined below.  Your care team is available to assist you in scheduling these services.  If you have already completed any of these items, please share that information with your care team to update in your medical record.  Health Maintenance Due   Topic Date Due    Diabetic Foot Exam  Never done    Hepatitis B Vaccine (1 of 3 - Risk 3-dose series) Never done    RSV VACCINE 60+ (1 - 1-dose 60+ series) Never done    Heart Failure Action Plan  07/18/2019    Annual Wellness Visit  07/26/2022    Eye Exam  04/05/2023    ANNUAL REVIEW OF HM ORDERS  08/01/2023    Flu Vaccine (1) 09/01/2023    COVID-19 Vaccine (5 - 2023-24 season) 09/01/2023    A1C Lab  11/01/2023

## 2023-10-24 NOTE — PROGRESS NOTES
"SUBJECTIVE:   Ralph is a 83 year old who presents for Preventive Visit.      10/24/2023     3:13 PM   Additional Questions   Roomed by EUGENIO Soliz   Accompanied by n/a         10/24/2023     3:13 PM   Patient Reported Additional Medications   Patient reports taking the following new medications n/a       Are you in the first 12 months of your Medicare coverage?  No    Healthy Habits:     In general, how would you rate your overall health?  Good    Frequency of exercise:  None    Do you usually eat at least 4 servings of fruit and vegetables a day, include whole grains    & fiber and avoid regularly eating high fat or \"junk\" foods?  Yes    Taking medications regularly:  Yes    Medication side effects:  No muscle aches, No significant flushing and Lightheadedness    Ability to successfully perform activities of daily living:  No assistance needed    Home Safety:  No safety concerns identified    Hearing Impairment:  No hearing concerns    In the past 6 months, have you been bothered by leaking of urine?  No    In general, how would you rate your overall mental or emotional health?  Good    Additional concerns today:  No          Have you ever done Advance Care Planning? (For example, a Health Directive, POLST, or a discussion with a medical provider or your loved ones about your wishes): No, advance care planning information given to patient to review.  Patient declined advance care planning discussion at this time.       Fall risk  Fallen 2 or more times in the past year?: No (Fallen once in the past year and injured)  Any fall with injury in the past year?: No    Cognitive Screening   1) Repeat 3 items (Leader, Season, Table)    2) Clock draw: NORMAL  3) 3 item recall: Recalls 3 objects  Results: 3 items recalled: COGNITIVE IMPAIRMENT LESS LIKELY    Mini-CogTM Copyright CARMELO Cedillo. Licensed by the author for use in [facility]; reprinted with permission (sandi@.[facility]). All rights reserved.      Do you " have sleep apnea, excessive snoring or daytime drowsiness? : yes    Reviewed and updated as needed this visit by clinical staff   Tobacco  Allergies  Meds              Reviewed and updated as needed this visit by Provider                 Social History     Tobacco Use    Smoking status: Former     Packs/day: 1.00     Years: 40.00     Additional pack years: 0.00     Total pack years: 40.00     Types: Cigarettes     Start date: 4/15/1962     Quit date: 10/23/2002     Years since quittin.0    Smokeless tobacco: Never   Substance Use Topics    Alcohol use: Yes     Comment: a couple beers per week (socially)             2022     9:07 AM   Alcohol Use   Prescreen: >3 drinks/day or >7 drinks/week? No     Do you have a current opioid prescription? No  Do you use any other controlled substances or medications that are not prescribed by a provider? None              Current providers sharing in care for this patient include:   Patient Care Team:  Jodi Flower Mai, MD as PCP - General (Internal Medicine)  Calderon Santo MD as MD (Cardiology)  Carla Dao APRN CNP as Nurse Practitioner (Nurse Practitioner)  Walt Garcia MD as MD (Orthopedics)  Jodi Flower Mai, MD as Assigned PCP  Isma English MD as MD (Neurology)  Isma English MD as Assigned Neuroscience Provider  Lluvia Rich RN as Personal Advocate & Liaison (PAL)  Calderon Santo MD as Assigned Heart and Vascular Provider  Ruthann Crowell [facility] as Pharmacist (Pharmacist)  Agata Santos PA-C as Assigned Sleep Provider    The following health maintenance items are reviewed in Epic and correct as of today:  Health Maintenance   Topic Date Due    DIABETIC FOOT EXAM  Never done    HEPATITIS B IMMUNIZATION (1 of 3 - Risk 3-dose series) Never done    RSV VACCINE 60+ (1 - 1-dose 60+ series) Never done    HF ACTION PLAN  2019    MEDICARE ANNUAL WELLNESS VISIT  2022    EYE EXAM  2023    ANNUAL  "REVIEW OF HM ORDERS  08/01/2023    INFLUENZA VACCINE (1) 09/01/2023    COVID-19 Vaccine (5 - 2023-24 season) 09/01/2023    A1C  11/01/2023    CBC  01/17/2024    BMP  02/01/2024    MICROALBUMIN  02/01/2024    ALT  08/01/2024    LIPID  08/01/2024    PSA  08/01/2024    FALL RISK ASSESSMENT  10/24/2024    ADVANCE CARE PLANNING  08/01/2027    DTAP/TDAP/TD IMMUNIZATION (3 - Td or Tdap) 09/07/2027    TSH W/FREE T4 REFLEX  Completed    PHQ-2 (once per calendar year)  Completed    Pneumococcal Vaccine: 65+ Years  Completed    ZOSTER IMMUNIZATION  Completed    IPV IMMUNIZATION  Aged Out    HPV IMMUNIZATION  Aged Out    MENINGITIS IMMUNIZATION  Aged Out    URINE DRUG SCREEN  Discontinued     Labs reviewed in EPIC          Review of Systems   Constitutional:  Negative for chills and fever.   HENT:  Positive for hearing loss. Negative for congestion, ear pain and sore throat.    Eyes:  Negative for pain and visual disturbance.   Respiratory:  Positive for cough and shortness of breath.    Cardiovascular:  Positive for peripheral edema. Negative for chest pain and palpitations.   Gastrointestinal:  Negative for abdominal pain, constipation, diarrhea, heartburn, hematochezia and nausea.   Genitourinary:  Positive for frequency and impotence. Negative for dysuria, genital sores, hematuria, penile discharge and urgency.   Musculoskeletal:  Negative for arthralgias, joint swelling and myalgias.   Skin:  Positive for rash.   Neurological:  Positive for weakness. Negative for dizziness, headaches and paresthesias.   Psychiatric/Behavioral:  The patient is not nervous/anxious.      All other systems on a 10-point review are negative, unless otherwise noted in HPI      OBJECTIVE:   BP (!) 152/72 (BP Location: Right arm, Patient Position: Sitting, Cuff Size: Adult Large)   Pulse 65   Temp 97.8  F (36.6  C) (Oral)   Resp 24   Ht 1.669 m (5' 5.71\")   Wt 98.6 kg (217 lb 6.4 oz)   SpO2 98%   BMI 35.40 kg/m   Estimated body mass index " "is 35.4 kg/m  as calculated from the following:    Height as of this encounter: 1.669 m (5' 5.71\").    Weight as of this encounter: 98.6 kg (217 lb 6.4 oz).  Physical Exam  GENERAL: healthy, alert and no distress  EYES: Eyes grossly normal to inspection, PERRL and conjunctivae and sclerae normal  HENT: ear canals and TM's normal, nose and mouth without ulcers or lesions  NECK: no adenopathy, no asymmetry, masses, or scars and thyroid normal to palpation  RESP: lungs clear to auscultation - no rales, rhonchi or wheezes  CV: regular rate and rhythm, normal S1 S2, no S3 or S4, no murmur, click or rub, no peripheral edema and peripheral pulses strong  ABDOMEN: soft, nontender, no hepatosplenomegaly, no masses and bowel sounds normal  MS: no gross musculoskeletal defects noted, no edema  SKIN: no suspicious lesions or rashes  NEURO: Normal strength and tone, mentation intact and speech normal  PSYCH: mentation appears normal, affect normal/bright    Diagnostic Test Results:  Labs reviewed in Epic    ASSESSMENT / PLAN:       ICD-10-CM    1. Encounter for Medicare annual wellness exam  Z00.00       2. Type 2 diabetes mellitus without complication, without long-term current use of insulin (H)  E11.9 Adult Eye  Referral     HEMOGLOBIN A1C     FOOT EXAM    Stable, recheck labs in Feb 2024  Cardiologist recommended considering SGLT2 inhibitor for cardiac and kidney protection.  A1C is at goal and cost is a significant factor for pt.  Will defer for now.      3. Mixed hyperlipidemia   - Stable, no side effects with medications, refilled meds today  - lipid panel reviewed E78.2 rosuvastatin (CRESTOR) 40 MG tablet     ezetimibe (ZETIA) 10 MG tablet      4. 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 Coronary artery disease without angina pectoris, unspecified vessel or lesion type, unspecified " "whether native or transplanted heart   - Stable, no side effects with medications, refilled meds today  - followed by cards - note appreciated I25.10 metoprolol succinate ER (TOPROL XL) 50 MG 24 hr tablet      6. Chronic diastolic congestive heart failure (H)   - Stable, no side effects with medications, refilled meds today   I50.32 metoprolol succinate ER (TOPROL XL) 50 MG 24 hr tablet      7. NSVT (nonsustained ventricular tachycardia)   - Stable, no side effects with medications, refilled meds today   I47.29 metoprolol succinate ER (TOPROL XL) 50 MG 24 hr tablet      8. Urinary retention   - Stable, no side effects with medications, refilled meds today   R33.9 tamsulosin (FLOMAX) 0.4 MG capsule      9. Rash (atopic)  - Stable, no side effects with medications, refilled meds today  - recommended emollient moisturizer too R21 fluocinonide (LIDEX) 0.05 % external ointment      10. Varicose veins of calf  I83.90 Vascular Surgery Referral    painful when he is standing or working in his yard all day.  Would like a consultation to treat these.  Worse on left leg.      11. PVD (peripheral vascular disease) (H24)  I73.9 Vascular Surgery Referral      12. BMI 35  E66.01     Pt stays physically active and trying to eat healthy.      13. Malignant neoplasm of prostate (H)  C61     sees urology                COUNSELING:  Reviewed preventive health counseling, as reflected in patient instructions      BMI:   Estimated body mass index is 35.4 kg/m  as calculated from the following:    Height as of this encounter: 1.669 m (5' 5.71\").    Weight as of this encounter: 98.6 kg (217 lb 6.4 oz).   Weight management plan: Discussed healthy diet and exercise guidelines      He reports that he quit smoking about 21 years ago. His smoking use included cigarettes. He started smoking about 61 years ago. He has a 40.00 pack-year smoking history. He has never used smokeless tobacco.      Appropriate preventive services were discussed with " this patient, including applicable screening as appropriate for fall prevention, nutrition, physical activity, Tobacco-use cessation, weight loss and cognition.  Checklist reviewing preventive services available has been given to the patient.    Reviewed patients plan of care and provided an AVS. The Intermediate Care Plan ( asthma action plan, low back pain action plan, and migraine action plan) for Fausto meets the Care Plan requirement. This Care Plan has been established and reviewed with the Patient.          Chris Flower MD  [facility]    Identified Health Risks:  I have reviewed Opioid Use Disorder and Substance Use Disorder risk factors and made any needed referrals.

## 2023-10-24 NOTE — PROGRESS NOTES
ANTICOAGULATION MANAGEMENT     Fausto Farr 83 year old male is on warfarin with therapeutic INR result. (Goal INR 2.5-3.5)    Recent labs: (last 7 days)     10/24/23  1458   INR 2.4*       ASSESSMENT     Source(s): Chart Review  Previous INR was Supratherapeutic.  Warfarin maintenance dose was lowered by 10% at last INR check.  Medication, diet, health changes since last INR chart reviewed; none identified         PLAN     Unable to reach Ralph today.    Left message with his wife requesting that he call INR clinic.  He was not home from his PCP appointment.    Follow up required to confirm warfarin dose taken and assess for changes    Ruthann Sanders RN  Anticoagulation Clinic  10/24/2023

## 2023-10-24 NOTE — PROGRESS NOTES
ANTICOAGULATION MANAGEMENT     Fausto Farr 83 year old male is on warfarin with subtherapeutic INR result. (Goal INR 2.5-3.5)    Recent labs: (last 7 days)     10/24/23  1458   INR 2.4*       ASSESSMENT     Source(s): Chart Review and Patient/Caregiver Call     Warfarin doses taken: Warfarin taken as instructed  Diet: No new diet changes identified  Medication/supplement changes: None noted  New illness, injury, or hospitalization: No  Signs or symptoms of bleeding or clotting: No  Previous result: Supratherapeutic, warfarin dose was decreased 10/12/23 by 10%  Additional findings: None       PLAN     Recommended plan for no diet, medication or health factor changes affecting INR. Patient requested Mon, Wed and Fri to be the 7.5 mg days     Dosing Instructions: Increase your warfarin dose (6.2% change) with next INR in 2 weeks       Summary  As of 10/24/2023      Full warfarin instructions:  7.5 mg every Mon, Wed, Fri; 5 mg all other days   Next INR check:  11/7/2023               Telephone call with Ralph who agrees to plan and repeated back plan correctly    Lab visit scheduled    Education provided:   Please call back if any changes to your diet, medications or how you've been taking warfarin  Contact [phone]  with any changes, questions or concerns.     Plan made per [facility] anticoagulation protocol    Cece Rios RN  Anticoagulation Clinic  10/24/2023    _______________________________________________________________________     Anticoagulation Episode Summary       Current INR goal:  2.5-3.5   TTR:  65.2% (1 y)   Target end date:  Indefinite   Send INR reminders to:  SAMAG DELMER    Indications    S/P aortic valve replacement [Z95.2]  S/P mitral valve replacement [Z95.2]  Long term current use of anticoagulant therapy [Z79.01]  Longstanding persistent atrial fibrillation (H) [I48.11]             Comments:  Per 9/20/22 Cardio Note strict INR goal of 2.5-3.5. If INR falls below 2.5 at any time, needs  to be bridged with Lovenox. consent to leave DVM for medical information and scheduling             Anticoagulation Care Providers       Provider Role Specialty Phone number    Jodi Flower Mai, MD Referring Internal Medicine - Pediatrics [phone]

## 2023-10-26 NOTE — TELEPHONE ENCOUNTER
Patient walked in asking to fax the 3 scripts that he has just received orders for to the [facility], [place].  Fax; [phone]    Printed the scripts for mesalamine, ezetimibe, and fluocinonide and they were faxed to this pharmacy.  We have previously, on 10/24/23, printed these for the patient and they just have to be faxed. ( We a refill request not able to e-prescribe them to this pharmacy)    Claudia Day RN

## 2023-11-04 ENCOUNTER — HEALTH MAINTENANCE LETTER (OUTPATIENT)
Age: 83
End: 2023-11-04

## 2023-11-07 ENCOUNTER — LAB (OUTPATIENT)
Dept: LAB | Facility: CLINIC | Age: 83
End: 2023-11-07
Payer: MEDICARE

## 2023-11-07 ENCOUNTER — ANTICOAGULATION THERAPY VISIT (OUTPATIENT)
Dept: ANTICOAGULATION | Facility: CLINIC | Age: 83
End: 2023-11-07

## 2023-11-07 DIAGNOSIS — Z95.2 S/P MITRAL VALVE REPLACEMENT: ICD-10-CM

## 2023-11-07 DIAGNOSIS — I48.11 LONGSTANDING PERSISTENT ATRIAL FIBRILLATION (H): ICD-10-CM

## 2023-11-07 DIAGNOSIS — Z95.2 S/P AORTIC VALVE REPLACEMENT: Primary | ICD-10-CM

## 2023-11-07 DIAGNOSIS — Z95.2 S/P AORTIC VALVE REPLACEMENT: ICD-10-CM

## 2023-11-07 DIAGNOSIS — Z79.01 LONG TERM CURRENT USE OF ANTICOAGULANT THERAPY: ICD-10-CM

## 2023-11-07 LAB — INR BLD: 2.4 (ref 0.9–1.1)

## 2023-11-07 PROCEDURE — 85610 PROTHROMBIN TIME: CPT

## 2023-11-07 PROCEDURE — 36416 COLLJ CAPILLARY BLOOD SPEC: CPT

## 2023-11-07 NOTE — PROGRESS NOTES
ANTICOAGULATION MANAGEMENT     Fausto Farr 83 year old male is on warfarin with subtherapeutic INR result. (Goal INR 2.5-3.5)    Recent labs: (last 7 days)     11/07/23  0906   INR 2.4*             ASSESSMENT     Source(s): Chart Review and Patient/Caregiver Call     Warfarin doses taken: More warfarin taken than planned which may be affecting INR  Diet: No new diet changes identified-Patient reports that he eats greens every other day, this is not a new regimen. Ralph states he eats cabbage, green beans, peas and broccoli and iceberg lettuce. Patient denies drinking boost, ensure or V8.  Medication/supplement changes: taking 2-4 tablets of tylenol per day as needed for leg pain.  Patient also reports that he has taken prophylactic augmentin once daily since 2019--added to his medication list.  New illness, injury, or hospitalization: No; however, patient reports intermittent right leg pain, same leg as TKA years ago. Patient denies red, warm skin or swelling.  Signs or symptoms of bleeding or clotting: No  Previous result: Subtherapeutic  Additional findings: Warfarin maintenance dose was increased 6% at last visit; however, patient taking warfarin differently. Since INR is still subtherapeutic even though he took more warfarin than planned, I increased maintenance dose again.       PLAN     Recommended plan for no diet, medication or health factor changes affecting INR     Dosing Instructions: Increase your warfarin dose (11% change)--(patient going from 45mg/week to 47.5/week is only a 5% increase) with next INR in 10-14 days; patient elected for 14 days.     Summary  As of 11/7/2023      Full warfarin instructions:  5 mg every Mon, Fri; 7.5 mg all other days   Next INR check:  11/21/2023               Telephone call with Ralph who verbalizes understanding and agrees to plan and who agrees to plan and repeated back plan correctly    Lab visit scheduled    Education provided:   Dietary considerations:  importance of consistent vitamin K intake, impact of vitamin K foods on INR, and vitamin K content of foods    Plan made per [facility] anticoagulation protocol    Aure Sampson RN  Anticoagulation Clinic  11/7/2023    _______________________________________________________________________     Anticoagulation Episode Summary       Current INR goal:  2.5-3.5   TTR:  61.3% (12 mo)   Target end date:  Indefinite   Send INR reminders to:  SAMAG DELMER    Indications    S/P aortic valve replacement [Z95.2]  S/P mitral valve replacement [Z95.2]  Long term current use of anticoagulant therapy [Z79.01]  Longstanding persistent atrial fibrillation (H) [I48.11]             Comments:  Per 9/20/22 Cardio Note strict INR goal of 2.5-3.5. If INR falls below 2.5 at any time, needs to be bridged with Lovenox. consent to leave DVM for medical information and scheduling             Anticoagulation Care Providers       Provider Role Specialty Phone number    Jodi Flower Mai, MD Referring Internal Medicine - Pediatrics [phone]

## 2023-11-20 ENCOUNTER — NURSE TRIAGE (OUTPATIENT)
Dept: PEDIATRICS | Facility: CLINIC | Age: 83
End: 2023-11-20

## 2023-11-20 NOTE — TELEPHONE ENCOUNTER
Nurse Triage SBAR    Is this a 2nd Level Triage? NO    Situation:   - Called the patient at [phone]   - Patient complaining of right leg pain (from his right knee down, shin pain, denies calf pain)     Background:   - Patient had right knee surgery in 2019   - Patient reports that he had back surgery in the past   - Denies recent surgery (within the last 30 days)     Assessment:   - Patient complaining of right leg pain (from his right knee down, shin pain, denies calf pain), patient states that when he sits down, the pain goes up into his right thigh and right buttock   - Patient rates the pain ranging from 5-9/10   - Patient denies calf pain, redness, warmth, swelling, and/or a lump/bump to his right calf   - Patient denies fever, chest pain, shortness of breath, difficulty breathing, rash, and weakness   - Patient able to ambulate   - Denies recent injury to the area     Protocol Recommended Disposition:   See in Office Within 3 Days    Recommendation:   - Assisted in scheduling the patient for an appointment with Any Alfred PA-C tomorrow (11/21/2023) at 10:30 AM (arrival time of 10:10 AM)   - Patient verbalized understanding and agrees with the plan      Routed to provider    Does the patient meet one of the following criteria for ADS visit consideration? 16+ years old, with an [facility] PCP     TIP  Providers, please consider if this condition is appropriate for management at one of our Acute and Diagnostic Services sites.     If patient is a good candidate, please use dotphrase <dot>triageresponse and select Refer to ADS to document.    Reason for Disposition   MODERATE pain (e.g., interferes with normal activities, limping) and present > 3 days    Additional Information   Negative: Looks like a broken bone or dislocated joint (e.g., crooked or deformed)   Negative: Sounds like a life-threatening emergency to the triager   Negative: Followed a hip injury   Negative: Followed a knee injury   Negative:  Followed an ankle or foot injury   Negative: Back pain radiating (shooting) into leg(s)   Negative: Foot pain is main symptom   Negative: Ankle pain is main symptom   Negative: Knee pain is main symptom   Negative: Leg swelling is main symptom   Negative: Chest pain   Negative: Difficulty breathing   Negative: Entire foot is cool or blue in comparison to other side   Negative: Unable to walk   Negative: Fever and red area (or area very tender to touch)   Negative: Swollen joint and fever   Negative: Thigh or calf pain in only one leg and present > 1 hour   Negative: Thigh, calf, or ankle swelling in only one leg   Negative: Thigh, calf, or ankle swelling in both legs, but one side is definitely more swollen   Negative: History of prior 'blood clot' in leg or lungs (i.e., deep vein thrombosis, pulmonary embolism)   Negative: History of inherited increased risk of blood clots (e.g., factor 5 Leiden, antithrombin 3, protein C or protein S deficiency, prothrombin mutation)   Negative: Major surgery in past month   Negative: Hip or leg fracture (broken bone) in past month (or had cast on leg or ankle in past month)   Negative: Illness requiring prolonged bedrest in past month (e.g., immobilization, long hospital stay)   Negative: Long-distance travel in past month (e.g., car, bus, train, plane; with trip lasting 6 or more hours)   Negative: Cancer treatment in past six months (or has cancer now)   Negative: Patient sounds very sick or weak to the triager   Negative: SEVERE pain (e.g., excruciating, unable to do any normal activities)   Negative: Cast on leg or ankle and now has increasing pain   Negative: Red area or streak > 2 inches (or 5 cm)   Negative: Painful rash with multiple small blisters grouped together (i.e., dermatomal distribution or 'band' or 'stripe')   Negative: Looks like a boil, infected sore, deep ulcer, or other infected rash (spreading redness, pus)   Negative: Localized rash is very painful (no  "fever)   Negative: Numbness in a leg or foot (i.e., loss of sensation)   Negative: Localized pain, redness or hard lump along vein   Negative: Patient wants to be seen    Answer Assessment - Initial Assessment Questions  1. ONSET: \"When did the pain start?\"       Right leg pain, had knee surgery about 5 years ago on right knee.   2. LOCATION: \"Where is the pain located?\"       States that the pain is mainly from the knee down. When he sits down, the pain also goes up to his thigh and right buttock.   3. PAIN: \"How bad is the pain?\"    (Scale 1-10; or mild, moderate, severe)    -  MILD (1-3): doesn't interfere with normal activities     -  MODERATE (4-7): interferes with normal activities (e.g., work or school) or awakens from sleep, limping     -  SEVERE (8-10): excruciating pain, unable to do any normal activities, unable to walk      Rates the pain ranging from 5-9/10. Wakes him up at night and the pain is the worst in the morning.   4. WORK OR EXERCISE: \"Has there been any recent work or exercise that involved this part of the body?\"       Denies other than yard work.   5. CAUSE: \"What do you think is causing the leg pain?\"      Possibly past right knee or back surgery.   6. OTHER SYMPTOMS: \"Do you have any other symptoms?\" (e.g., chest pain, back pain, breathing difficulty, swelling, rash, fever, numbness, weakness)      Denies.   7. PREGNANCY: \"Is there any chance you are pregnant?\" \"When was your last menstrual period?\"      N/A.    Protocols used: Leg Pain-A-OH    Routing to Dr. Dumas (AM POD) as the patient's PCP is out of the clinic today. Do you agree with the plan for the patient to be seen by Any Alfred PA-C tomorrow (11/21/2023)?      Lluvia TOLEDO RN   Patient Advocate Liaison (PAL)  [facility]   [phone]   "

## 2023-11-20 NOTE — TELEPHONE ENCOUNTER
PAL please call pt regarding leg pain, see below    Pt called c/o right leg pain from the knee down and the shin not the calf      Thank you  Julio Naranjo RN on 11/20/2023 at 8:28 AM

## 2023-11-21 ENCOUNTER — ANCILLARY PROCEDURE (OUTPATIENT)
Dept: GENERAL RADIOLOGY | Facility: CLINIC | Age: 83
End: 2023-11-21
Attending: PHYSICIAN ASSISTANT
Payer: MEDICARE

## 2023-11-21 ENCOUNTER — ANTICOAGULATION THERAPY VISIT (OUTPATIENT)
Dept: ANTICOAGULATION | Facility: CLINIC | Age: 83
End: 2023-11-21

## 2023-11-21 ENCOUNTER — LAB (OUTPATIENT)
Dept: LAB | Facility: CLINIC | Age: 83
End: 2023-11-21
Payer: MEDICARE

## 2023-11-21 ENCOUNTER — OFFICE VISIT (OUTPATIENT)
Dept: PEDIATRICS | Facility: CLINIC | Age: 83
End: 2023-11-21
Payer: MEDICARE

## 2023-11-21 VITALS
TEMPERATURE: 97.7 F | DIASTOLIC BLOOD PRESSURE: 60 MMHG | HEART RATE: 61 BPM | RESPIRATION RATE: 14 BRPM | WEIGHT: 217.3 LBS | BODY MASS INDEX: 35.38 KG/M2 | OXYGEN SATURATION: 97 % | SYSTOLIC BLOOD PRESSURE: 126 MMHG

## 2023-11-21 DIAGNOSIS — Z95.2 S/P AORTIC VALVE REPLACEMENT: Primary | ICD-10-CM

## 2023-11-21 DIAGNOSIS — M25.551 HIP PAIN, RIGHT: Primary | ICD-10-CM

## 2023-11-21 DIAGNOSIS — L98.9 SKIN LESION: ICD-10-CM

## 2023-11-21 DIAGNOSIS — M25.551 HIP PAIN, RIGHT: ICD-10-CM

## 2023-11-21 DIAGNOSIS — Z95.2 S/P AORTIC VALVE REPLACEMENT: ICD-10-CM

## 2023-11-21 DIAGNOSIS — M79.651 PAIN OF RIGHT THIGH: ICD-10-CM

## 2023-11-21 DIAGNOSIS — I48.11 LONGSTANDING PERSISTENT ATRIAL FIBRILLATION (H): ICD-10-CM

## 2023-11-21 DIAGNOSIS — Z95.2 S/P MITRAL VALVE REPLACEMENT: ICD-10-CM

## 2023-11-21 DIAGNOSIS — Z79.01 LONG TERM CURRENT USE OF ANTICOAGULANT THERAPY: ICD-10-CM

## 2023-11-21 LAB — INR BLD: 4.3 (ref 0.9–1.1)

## 2023-11-21 PROCEDURE — 36416 COLLJ CAPILLARY BLOOD SPEC: CPT

## 2023-11-21 PROCEDURE — 85610 PROTHROMBIN TIME: CPT

## 2023-11-21 PROCEDURE — 73502 X-RAY EXAM HIP UNI 2-3 VIEWS: CPT | Mod: TC | Performed by: STUDENT IN AN ORGANIZED HEALTH CARE EDUCATION/TRAINING PROGRAM

## 2023-11-21 PROCEDURE — 99214 OFFICE O/P EST MOD 30 MIN: CPT | Performed by: PHYSICIAN ASSISTANT

## 2023-11-21 RX ORDER — RESPIRATORY SYNCYTIAL VIRUS VACCINE 120MCG/0.5
0.5 KIT INTRAMUSCULAR ONCE
Qty: 1 EACH | Refills: 0 | Status: CANCELLED | OUTPATIENT
Start: 2023-11-21 | End: 2023-11-21

## 2023-11-21 NOTE — PROGRESS NOTES
Assessment & Plan     Hip pain, right    - XR Hip Right 2-3 Views; Future  - Adult Dermatology  Referral; Future  - Physical Therapy Referral; Future    Pain of right thigh    - Physical Therapy Referral; Future    Skin lesion    - Adult Dermatology  Referral; Future      Patient was seen today for right leg pain.  On inspection there is no edema, erythema or warmth noted on the skin.  Pain seems to come from hip area.  Normal ROM of the hip and legs bilaterally.  No spinal tenderness to palpation on exam today.  Patient was advised to see physical therapy and of tylenol regimen.  See patient plan.  Lesion on ear needs to be seen and biopsied by dermatology.  Referral placed.  Patient understands with and agrees with the plan today.             MILAGROS Bui [facility] DELMER Rosas is a 83 year old, presenting for the following health issues:  Musculoskeletal Problem      11/21/2023    10:21 AM   Additional Questions   Roomed by Estefania Farias CMA       Musculoskeletal Problem    History of Present Illness       Reason for visit:  Leg pain, Also has a skin issue on left ear  Symptom onset:  More than a month  Symptoms include:  Knee and leg pain, dry spot on ear  Symptom intensity:  Moderate  Symptom progression:  Worsening  What makes it worse:  Sitting on a stool  What makes it better:  Posture change        Patient states that he has had leg pain for over the last year at least.  He says that he has had several knee surgeries on the same leg, but the pain is in his hip area and goes into his leg.  He does not have any numbness or tingling and he denies any loss of bladder or bowel control.  He states that he does usually take tylenol, 2 tabs in the am and once before bed and that helps his symptoms.  He also wants to have a lesion checked on his ear.  The lesion has been there for several years, but is more constant now and bothersome if he lays on that  side.        Review of Systems   Constitutional, HEENT, cardiovascular, pulmonary, gi and gu systems are negative, except as otherwise noted.      Objective    /60 (BP Location: Right arm, Patient Position: Sitting, Cuff Size: Adult Large)   Pulse 61   Temp 97.7  F (36.5  C) (Tympanic)   Resp 14   Wt 98.6 kg (217 lb 4.8 oz)   SpO2 97%   BMI 35.38 kg/m    Body mass index is 35.38 kg/m .  Physical Exam   GENERAL: healthy, alert and no distress  EYES: Eyes grossly normal to inspection, PERRL and conjunctivae and sclerae normal  RESP: lungs clear to auscultation - no rales, rhonchi or wheezes  CV: regular rate and rhythm, normal S1 S2, no S3 or S4, no murmur, click or rub, no peripheral edema and peripheral pulses strong  MS: no gross musculoskeletal defects noted, no edema  SKIN: Left ear with red, crusted lesion on the pina.  Looks to have some telangiectasia as well.  No drainage noted.    BACK: no CVA tenderness, no paralumbar tenderness    Xray - Reviewed and interpreted by me.  Unremarkable for acute process.  Some degeneration noted.      Any Alfred PA-C

## 2023-11-21 NOTE — PROGRESS NOTES
ANTICOAGULATION MANAGEMENT     Fausto Farr 83 year old male is on warfarin with supratherapeutic INR result. (Goal INR 2.5-3.5)    Recent labs: (last 7 days)     11/21/23  1124   INR 4.3*       ASSESSMENT     Source(s): Chart Review and Patient/Caregiver Call     Warfarin doses taken: Patient has taken less warfarin than planned; missed dose on 11/15 but made up for it on 11/16/23.  Diet: No new diet changes identified  Medication/supplement changes: still taking the same amount of tylenol.  New illness, injury, or hospitalization: No; however, right hip and right thigh pain is on-going, office visit today-no change in care plan.  Signs or symptoms of bleeding or clotting: No  Previous result: Subtherapeutic  Additional findings: Warfarin maintenance dose was increased at last visit, decreasing today due to supratherapeutic INR and on -going pain.         PLAN     Recommended plan for temporary change(s) and ongoing change(s) affecting INR     Dosing Instructions: partial hold then decrease your warfarin dose (5% change) with next INR in 10 days       Summary  As of 11/21/2023      Full warfarin instructions:  11/21: 5 mg; Otherwise 5 mg every Mon, Wed, Fri; 7.5 mg all other days   Next INR check:  12/1/2023               Telephone call with Ralph who verbalizes understanding and agrees to plan    Lab visit scheduled    Education provided:   Contact [phone]  with any changes, questions or concerns.    And how pain can affect INR    Plan made with [facility] Pharmacist Flaquita Sampson, RN  Anticoagulation Clinic  11/21/2023    _______________________________________________________________________     Anticoagulation Episode Summary       Current INR goal:  2.5-3.5   TTR:  59.5% (12 mo)   Target end date:  Indefinite   Send INR reminders to:  SHANNA DOWELL    Indications    S/P aortic valve replacement [Z95.2]  S/P mitral valve replacement [Z95.2]  Long term current use of anticoagulant therapy  [Z79.01]  Longstanding persistent atrial fibrillation (H) [I48.11]             Comments:  Per 9/20/22 Cardio Note strict INR goal of 2.5-3.5. If INR falls below 2.5 at any time, needs to be bridged with Lovenox. consent to leave DVM for medical information and scheduling             Anticoagulation Care Providers       Provider Role Specialty Phone number    Jodi Flower Mai, MD Referring Internal Medicine - Pediatrics [phone]

## 2023-11-27 ENCOUNTER — PATIENT OUTREACH (OUTPATIENT)
Dept: PEDIATRICS | Facility: CLINIC | Age: 83
End: 2023-11-27
Payer: MEDICARE

## 2023-11-27 NOTE — TELEPHONE ENCOUNTER
Patient Quality Outreach Health Maintenance - PAL RN    Summary:    PAL RN contacted pt regarding overdue health maintenance    Patient is due/failing the following:   Diabetes -  A1C and Eye Exam  There are no preventive care reminders to display for this patient.    Health Maintenance Due   Topic Date Due    RSV VACCINE (Pregnancy & 60+) (1 - 1-dose 60+ series) Never done    HF ACTION PLAN  07/18/2019    EYE EXAM  04/05/2023    A1C  11/01/2023       Type of outreach:    Chart review performed, no outreach needed. Updated the patient's immunization record to reflect [facility].     Per [facility]:   RSV 10/31/2023 1 of 1 Arexvy Full   No     - Advised patient if any questions or concerns comes up to call the PAL RN.   - Patient given opportunity to ask questions and at this time there is nothing further needed.     Questions for provider review:    None    Lluvia TOLEDO RN   Patient Advocate Liaison (PAL)  [facility]   [phone]

## 2023-11-29 ENCOUNTER — OFFICE VISIT (OUTPATIENT)
Dept: DERMATOLOGY | Facility: CLINIC | Age: 83
End: 2023-11-29
Attending: PHYSICIAN ASSISTANT
Payer: MEDICARE

## 2023-11-29 DIAGNOSIS — L57.8 ACTINIC SKIN DAMAGE: ICD-10-CM

## 2023-11-29 DIAGNOSIS — L98.9 SKIN LESION: ICD-10-CM

## 2023-11-29 DIAGNOSIS — L81.4 LENTIGINES: ICD-10-CM

## 2023-11-29 DIAGNOSIS — M25.551 HIP PAIN, RIGHT: ICD-10-CM

## 2023-11-29 DIAGNOSIS — D48.5 NEOPLASM OF UNCERTAIN BEHAVIOR OF SKIN: Primary | ICD-10-CM

## 2023-11-29 PROCEDURE — 69100 BIOPSY OF EXTERNAL EAR: CPT | Performed by: STUDENT IN AN ORGANIZED HEALTH CARE EDUCATION/TRAINING PROGRAM

## 2023-11-29 PROCEDURE — 88305 TISSUE EXAM BY PATHOLOGIST: CPT | Performed by: DERMATOLOGY

## 2023-11-29 PROCEDURE — 99203 OFFICE O/P NEW LOW 30 MIN: CPT | Mod: 25 | Performed by: STUDENT IN AN ORGANIZED HEALTH CARE EDUCATION/TRAINING PROGRAM

## 2023-11-29 NOTE — PATIENT INSTRUCTIONS
Wound Care After a Biopsy    What is a skin biopsy?  A skin biopsy allows the doctor to examine a very small piece of tissue under the microscope to determine the diagnosis and the best treatment for the skin condition. A local anesthetic (numbing medicine) is injected with a very small needle into the skin area to be tested. A small piece of skin is taken from the area. Sometimes a suture (stitch) is used.     What are the risks of a skin biopsy?  I will experience scar, bleeding, swelling, pain, crusting and redness. I may experience incomplete removal or recurrence. Risks of this procedure are excessive bleeding, bruising, infection, nerve damage, numbness, thick (hypertrophic or keloidal) scar and non-diagnostic biopsy.    How should I care for my wound for the first 24 hours?  Keep the wound dry and covered for 24 hours  If it bleeds, hold direct pressure on the area for 15 minutes. If bleeding does not stop, call us or go to the emergency room  Avoid strenuous exercise the first 1-2 days or as your doctor instructs you    How should I care for the wound after 24 hours?  After 24 hours, remove the bandage  You may bathe or shower as normal  If you had a scalp biopsy, you can shampoo as usual and can use shower water to clean the biopsy site daily  Clean the wound once a day with gentle soap and water  Do not scrub, be gentle  Apply white petroleum/Vaseline after cleaning the wound with a cotton swab or a clean finger, and keep the site covered with a Bandaid /bandage. Bandages are not necessary with a scalp biopsy  If you are unable to cover the site with a Bandaid /bandage, re-apply ointment 2-3 times a day to keep the site moist. Moisture will help with healing  Avoid strenuous activity for first 1-2 days  Avoid lakes, rivers, pools, and oceans until the stitches are removed or the site is healed    How do I clean my wound?  Wash hands thoroughly with soap or use hand  before all wound care  Clean  the wound with gentle soap and water  Apply white petroleum/Vaseline  to wound after it is clean  Replace the Bandaid /bandage to keep the wound covered for the first few days or as instructed by your doctor  If you had a scalp biopsy, warm shower water to the area on a daily basis should suffice    What should I use to clean my wound?   Cotton-tipped applicators (Qtips )  White petroleum jelly (Vaseline ). Use a clean new container and use Q-tips to apply.  Bandaids  as needed  Gentle soap     How should I care for my wound long term?  Do not get your wound dirty  Keep up with wound care for one week or until the area is healed.  A small scab will form and fall off by itself when the area is completely healed. The area will be red and will become pink in color as it heals. Sun protection is very important for how your scar will turn out. Sunscreen with an SPF 30 or greater is recommended once the area is healed.  You should have some soreness but it should be mild and slowly go away over several days. Talk to your doctor about using tylenol for pain,    When should I call my doctor?  If you have increased:   Pain or swelling  Pus or drainage (clear or slightly yellow drainage is ok)  Temperature over 100F  Spreading redness or warmth around wound    When will I hear about my results?  The biopsy results can take 2 weeks to come back.  Your results will automatically release to [facility] before your provider has even reviewed them.  The clinic will call you with the results, send you a [facility] message, or have you schedule a follow-up clinic or phone time to discuss the results.  Contact our clinics if you do not hear from us in 2 weeks.    Who should I call with questions?  [facility]: [phone]  [facility]: [phone]  For urgent needs outside of business hours call the [facility] at [phone] and ask for the dermatology resident on call

## 2023-11-29 NOTE — PROGRESS NOTES
[facility] Dermatology Note    Encounter Date: Nov 29, 2023    Dermatology Problem List:    ______________________________________    Impression/Plan:  Ralph was seen today for derm problem.    Diagnoses and all orders for this visit:    Neoplasm of uncertain behavior of skin  -     BIOPSY OF EXTERNAL EAR  -     Dermatological Path Order and Indications; Standing  -     Dermatological Path Order and Indications  - shave bx ddx CNH vs sccis, no induration       Actinic skin damage  Lentigines  - Reviewed the compounding benefits of incremental changes to sun protective clothing behaviors including increased frequency of sunscreen and sun protective clothing like broad brimmed hats and longsleeved UPF containing clothing        - SHAVE BIOPSY PROCEDURE NOTE: After written informed consent was obtained, a time out was taken to identify the patient and the correct site for biopsy. The lesion on the L helix  was cleansed with a 70% isopropyl alcohol wipe, and then injected with 2 cc of lidocaine 1% with epinephrine 1:100,000. Once anesthesia was ensured, the visible surface of the lesion was biopsied using a [place] blade in standard technique. Hemostasis was obtained with pressure and aluminum chloride 20% solution. The specimen was placed in a labeled formalin container and sent to pathology for sectioning and analysis. The wound was dressed with white petrolatum and an adhesive bandage. The patient tolerated the procedure well. Post-procedure instructions and recommendations were provided both verbally and in writing.    Follow-up after results .       Staff Involved:  Staff Only    Arjun Malik MD   of Dermatology  Department of Dermatology  [facility]      CC:   Chief Complaint   Patient presents with    Derm Problem     Lesion on the L ear- happened last summer, winter healed up, and now it is back and almost healed up, a week ago it was very  sore, per pt        History of Present Illness:  Mr. Fausto Farr is a 83 year old male who presents as a new patient.    Presents for a tender spot on his left helix that has recently become more sore and crusted.  Something like this happened in the past but it heal up on its own.  He prefers to sleep on his left side.  He is concerned that it could be skin cancer and like to have it evaluated    Labs:      Physical exam:  Vitals: There were no vitals taken for this visit.  GEN: well developed, well-nourished, in no acute distress, in a pleasant mood.     SKIN: Moser phototype 1  - Sun-exposed skin, which includes the head/face, neck, both arms, digits, and/or nails was examined.   - Flat brown macules and patches in a sun exposed areas on face and extremities  - crusted papule L helix  - No other lesions of concern on areas examined.     Past Medical History:   Past Medical History:   Diagnosis Date    Abdominal pain     Anemia     currently taking iron    Back pain     since 1980    BPH (benign prostatic hyperplasia)     Bruit     CAD (coronary artery disease)     Cellulitis 10/18/2012    Cellulitis 05/2018    GrpB strep LLE cellulitis  negative RACHAEL for veg    Chronic venous insufficiency     bilat lower extremities    Contact dermatitis and other eczema, due to unspecified cause     Diaphragmatic hernia without mention of obstruction or gangrene     Diastolic HF (heart failure) (H)     Gastric ulcer     Hypertension 08/06/2013    Lumbago     Mesenteric artery insufficiency (H24)     known AAA and narrowing of intestinal artery's  followed by dr raymond    Metabolic syndrome     Mitral valve disease     s/p mechanical MVR, prior mech AVR    Nonallopathic lesion of lumbar region     Nonallopathic lesion of rib cage     Nonallopathic lesion of sacral region     GAGE (obstructive sleep apnea)     CPAP    Paroxysmal atrial fibrillation (H) 10/18/2012    occured after stopping BB  (prior  aflutter  ablation)    Prostate cancer (H) 2008    radiation seed, XRT     PVD (peripheral vascular disease) (H24)     Rotator cuff strain     and sprain    S/P AAA repair     S/P aortic valve replacement 2006    developed perivalve leak and MS, therefore redo surg 2013    S/P CABG (coronary artery bypass graft) 2006    Lima-Lad, RA-Rca    Sciatica of left side     since 2000    Sepsis due to group B Streptococcus (H) 05/19/2018    TIA (transient ischemic attack) 08/01/2022    Total knee replacement status 07/22/2019    Ulcerative colitis (H)     Varicose veins of bilateral lower extremities with other complications     s/p RLE vein stripping    Vitamin D deficiency      Past Surgical History:   Procedure Laterality Date    AORTIC VALVE REPLACEMENT  1/3/06    redo AVR SJM 21mm and SJM MVR 27mm in 2013SJM 21([id number]):AVR, SJM 27 :MVR-    APPLY WOUND VAC N/A 11/12/2019    Procedure: APPLICATION, WOUND VAC;  Surgeon: Sara Martinez MD;  Location:  OR    ARTHROPLASTY KNEE      right knee    ARTHROPLASTY KNEE Right 7/22/2019    Procedure: Right total knee arthroplasty;  Surgeon: Prasanth Jensen MD;  Location:  OR    BACK SURGERY  Oct 2015    Fusion L4-5, laminectomy L2, L3    BYPASS GRAFT ARTERY CORONARY  10/2013    reimplantation of radial artery graft to RCA    CARDIAC CATHERIZATION  11/2005    Stent placed to RCA    CARDIAC CATHERIZATION  04/2013    Occluded RCA, patent LIMA to LAD and radial graft to PDA    CARPAL TUNNEL RELEASE RT/LT  1994    COLONOSCOPY  8-22-11    CYSTOSCOPY FLEXIBLE  10/16/2013    Procedure: CYSTOSCOPY FLEXIBLE;  FLEXIBLE CYSTOSCOPY / DILATION OF URETHRA / INSERTION OF LESLIE;  Surgeon: Cooper Wallace MD;  Location:  OR    ENDOVASCULAR REPAIR, INFRARENAL ABDOMINAL AORTIC ANEURYSM/DISSECTION; MODULAR BIFURCATED PROSTHESIS  2006    AAA repair endovascular    ENT SURGERY      EP ABLATION ATRIAL FLUTTER N/A 4/22/2019    Procedure: EP Ablation Atrial Flutter;  Surgeon: Pedro  Jessy Maza MD;  Location:  HEART CARDIAC CATH LAB    EP PACEMAKER DEVICE & LEAD IMPLANT- RIGHT ATRIAL & RIGHT VENTRICULAR N/A 8/2/2022    Procedure: Pacemaker Device & Lead Implant - Right Atrial & Right Ventricular;  Surgeon: Jessy Vasquez MD;  Location:  HEART CARDIAC CATH LAB    GENITOURINARY SURGERY  6/16/08    radioactive seeding    GRAFT FLAP PEDICLE EXTREMITY (LOCATION) Right 11/12/2019    Procedure: SURGICAL PROCUREMENT, FLAP, PEDICLE, EXTREMITY;  Surgeon: Sara Martinez MD;  Location:  OR    GRAFT SKIN SPLIT THICKNESS FROM EXTREMITY Right 11/12/2019    Procedure: RIGHT GASTRONEMIUS FLAP TO RIGHT KNEE, SPLIT THICKNESS SKIN GRAFT FROM RIGHT THIGH TO RIGHT KNEE, SURGICAL PROCUREMENT, FLAP, PEDICLE, EXTREMITY, WOUND VAC PLACEMENT;  Surgeon: Sara Martinez MD;  Location:  OR    HEAD & NECK SURGERY  1997    vocal cord polypectomy    INCISION AND DRAINAGE KNEE, COMBINED Right 8/29/2019    Procedure: INCISION AND DRAINAGE, KNEE W/ Secondary Wound Closure;  Surgeon: Prasanth Jensen MD;  Location:  OR    IRRIGATION AND DEBRIDEMENT KNEE, PLACE ANTIBIOTIC CEMENT BEADS / SPACE Right 2/12/2020    Procedure: 1. Irrigation and debridement of wound breakdown, right total knee arthroplasty.  2. Irrigation and debridement of right total knee with placement of antibiotic-impregnated (vancomycin) absorbable antibiotic beads.;  Surgeon: Prasanth Jensen MD;  Location:  OR    IRRIGATION AND DEBRIDEMENT LOWER EXTREMITY, COMBINED Right 12/8/2019    Procedure: DEBRIDEMENT OF RIGHT CALF AND WOUND CLOSURE.;  Surgeon: Sara Martinez MD;  Location:  OR    IRRIGATION AND DEBRIDEMENT UPPER EXTREMITY, COMBINED Right 1/7/2023    Procedure: Right elbow arthrotomy, irrigation and debridement;  Surgeon: Jose A Christine MD;  Location:  OR    KNEE SURGERY  2001 Right knee arthroscopy    OPTICAL TRACKING SYSTEM FUSION SPINE POSTERIOR LUMBAR THREE+ LEVELS N/A  10/29/2015    Procedure: OPTICAL TRACKING SYSTEM FUSION SPINE POSTERIOR LUMBAR THREE+ LEVELS;  Surgeon: Walt Garcia MD;  Location: [facility] OR    PROSTATE SURGERY      radioactive seeding 6/16/08    REPAIR ANEURYSM ABDOMINAL AORTA  06/08    REPAIR VALVE MITRAL  10/16/2013    SJM 21([id number]):AVR, SJM 27 :MVRProcedure: REPAIR VALVE MITRAL;  REDO STERNOTOMY/REDO AORTIC VALVE REPLACEMENT/ MITRAL VALVE REPLACEMENT/REIMPLANTATION OF RIGHT CORONARY ARTERY BYPASS WITH RACHAEL ( ON PUMP);  Surgeon: Viet Singh MD;  Location: [facility] OR    REPLACE VALVE AORTIC  10/16/2013    Procedure: REPLACE VALVE AORTIC;;  Surgeon: Viet Singh MD;  Location: [facility] OR    SURGERY GENERAL IP CONSULT  05/2008 Excision aneurysm abdominal aorta    SURGERY GENERAL IP CONSULT  1997 Vocal cord polypectomy    VASCULAR SURGERY  1968, 1993     varicose vein stripping    ZZC CABG, VEIN, TWO  1/3/06    Left radial to RCA, LIMA to LAD (RA to RCA reimplanted at time of 2013 surg)       Social History:   reports that he quit smoking about 21 years ago. His smoking use included cigarettes. He started smoking about 61 years ago. He has a 40.00 pack-year smoking history. He has never used smokeless tobacco. He reports current alcohol use. He reports that he does not use drugs.    Family History:  Family History   Problem Relation Age of Onset    No Known Problems Mother     Coronary Artery Disease Father         CABG    Heart Disease Father         Pacemaker    No Known Problems Brother     No Known Problems Sister     No Known Problems Son     Other Cancer Daughter     No Known Problems Daughter     Heart Disease Brother     Other - See Comments Grandchild        Medications:  Current Outpatient Medications   Medication Sig Dispense Refill    acetaminophen (TYLENOL) 325 MG tablet Take 2 tablets (650 mg) by mouth every 6 hours as needed for mild pain or other (and adjunct with moderate or severe pain or per patient request) 100 tablet 0     amoxicillin-clavulanate (AUGMENTIN) 875-125 MG tablet Take 1 tablet by mouth daily      betamethasone valerate (VALISONE) 0.1 % external lotion Apply topically 2 times daily Apply topically to scalp twice daily PRN 60 mL 3    cholecalciferol 25 MCG (1000 UT) TABS Take 1,000 Units by mouth daily      ezetimibe (ZETIA) 10 MG tablet Take 1 tablet (10 mg) by mouth daily 100 tablet 3    ferrous sulfate (FEROSUL) 325 (65 Fe) MG tablet Take 325 mg by mouth daily (with breakfast)      fluocinonide (LIDEX) 0.05 % external ointment Apply topically 2 times daily 60 g 11    glucosamine-chondroitin 500-400 MG CAPS per capsule Take 1 capsule by mouth every evening      mesalamine (ASACOL HD) 800 MG EC tablet Take 1 tablet (800 mg) by mouth 2 times daily 200 tablet 3    metoprolol succinate ER (TOPROL XL) 50 MG 24 hr tablet Take 1 1/2 tab (75mg) daily 135 tablet 3    rosuvastatin (CRESTOR) 40 MG tablet Take 1 tablet (40 mg) by mouth daily 100 tablet 3    tamsulosin (FLOMAX) 0.4 MG capsule Take 1 capsule (0.4 mg) by mouth daily 90 capsule 3    triamcinolone (KENALOG) 0.1 % external cream Apply topically 2 times daily 85.2 g 1    warfarin ANTICOAGULANT (COUMADIN) 5 MG tablet Take 1 tablet (5 mg) by mouth Monday, Wednesday, Friday and take 1.5 tablets (7.5 mg) all other days or as directed by INR clinic. 120 tablet 1     Allergies   Allergen Reactions    Bees Anaphylaxis

## 2023-12-01 ENCOUNTER — ANTICOAGULATION THERAPY VISIT (OUTPATIENT)
Dept: ANTICOAGULATION | Facility: CLINIC | Age: 83
End: 2023-12-01

## 2023-12-01 ENCOUNTER — LAB (OUTPATIENT)
Dept: LAB | Facility: CLINIC | Age: 83
End: 2023-12-01
Payer: MEDICARE

## 2023-12-01 DIAGNOSIS — Z79.01 LONG TERM CURRENT USE OF ANTICOAGULANT THERAPY: ICD-10-CM

## 2023-12-01 DIAGNOSIS — Z95.2 S/P AORTIC VALVE REPLACEMENT: ICD-10-CM

## 2023-12-01 DIAGNOSIS — Z95.2 S/P MITRAL VALVE REPLACEMENT: ICD-10-CM

## 2023-12-01 DIAGNOSIS — I48.11 LONGSTANDING PERSISTENT ATRIAL FIBRILLATION (H): ICD-10-CM

## 2023-12-01 DIAGNOSIS — Z95.2 S/P AORTIC VALVE REPLACEMENT: Primary | ICD-10-CM

## 2023-12-01 LAB — INR BLD: 5.5 (ref 0.9–1.1)

## 2023-12-01 PROCEDURE — 36415 COLL VENOUS BLD VENIPUNCTURE: CPT

## 2023-12-01 PROCEDURE — 85610 PROTHROMBIN TIME: CPT

## 2023-12-01 NOTE — PROGRESS NOTES
Per protocol, reporting to PCP a supratherapeutic INR of 5.5 on this patient. INR was managed by [facility] RN    Aure Sampson RN  Anticoagulation Clinic

## 2023-12-01 NOTE — PROGRESS NOTES
ANTICOAGULATION MANAGEMENT     Fausto Farr 83 year old male is on warfarin with supratherapeutic INR result. (Goal INR 2.5-3.5)    Recent labs: (last 7 days)     12/01/23  0912   INR 5.5*           ASSESSMENT     Source(s): Chart Review and Patient/Caregiver Call     Warfarin doses taken: Less warfarin taken than planned which may be affecting INR  Diet: No new diet changes identified  Medication/supplement changes: None noted  New illness, injury, or hospitalization: No; however, patient reports increased back and leg pain recently, this is an on-going issue.  Signs or symptoms of bleeding or clotting: No  Previous result: Supratherapeutic  Additional findings: Warfarin maintenance dose was decreased 5% at last visit; however, patient has been taking 42.5mg warfarin per week, I decreased him to 2 days of 7.5mg which is a 5% decrease, Ok'd by SILVINO Solis.       PLAN     Recommended plan for ongoing change(s) affecting INR     Dosing Instructions: hold dose then decrease your warfarin dose (5% change) with next INR in 3 days       Summary  As of 12/1/2023      Full warfarin instructions:  12/1: Hold; Otherwise 7.5 mg every Mon, Fri; 5 mg all other days   Next INR check:  12/4/2023               Telephone call with Ralph who verbalizes understanding and agrees to plan and who agrees to plan and repeated back plan correctly    Lab visit scheduled    Education provided:   Taking warfarin: importance of following [facility] instructions vs instructions on the prescription bottle and Importance of taking warfarin as instructed    Plan made with [facility] Pharmacist Irma Sampson, RN  Anticoagulation Clinic  12/1/2023    _______________________________________________________________________     Anticoagulation Episode Summary       Current INR goal:  2.5-3.5   TTR:  56.7% (12 mo)   Target end date:  Indefinite   Send INR reminders to:  SHANNA DOWELL    Indications    S/P aortic valve replacement [Z95.2]  S/P mitral  valve replacement [Z95.2]  Long term current use of anticoagulant therapy [Z79.01]  Longstanding persistent atrial fibrillation (H) [I48.11]             Comments:  Per 9/20/22 Cardio Note strict INR goal of 2.5-3.5. If INR falls below 2.5 at any time, needs to be bridged with Lovenox. consent to leave DVM for medical information and scheduling             Anticoagulation Care Providers       Provider Role Specialty Phone number    Jodi Flower Mai, MD Referring Internal Medicine - Pediatrics [phone]

## 2023-12-03 LAB
PATH REPORT.COMMENTS IMP SPEC: NORMAL
PATH REPORT.COMMENTS IMP SPEC: NORMAL
PATH REPORT.FINAL DX SPEC: NORMAL
PATH REPORT.GROSS SPEC: NORMAL
PATH REPORT.MICROSCOPIC SPEC OTHER STN: NORMAL
PATH REPORT.RELEVANT HX SPEC: NORMAL

## 2023-12-04 ENCOUNTER — LAB (OUTPATIENT)
Dept: LAB | Facility: CLINIC | Age: 83
End: 2023-12-04
Payer: MEDICARE

## 2023-12-04 ENCOUNTER — THERAPY VISIT (OUTPATIENT)
Dept: PHYSICAL THERAPY | Facility: CLINIC | Age: 83
End: 2023-12-04
Attending: PHYSICIAN ASSISTANT
Payer: MEDICARE

## 2023-12-04 ENCOUNTER — ANTICOAGULATION THERAPY VISIT (OUTPATIENT)
Dept: ANTICOAGULATION | Facility: CLINIC | Age: 83
End: 2023-12-04

## 2023-12-04 DIAGNOSIS — M25.551 HIP PAIN, RIGHT: ICD-10-CM

## 2023-12-04 DIAGNOSIS — I48.11 LONGSTANDING PERSISTENT ATRIAL FIBRILLATION (H): ICD-10-CM

## 2023-12-04 DIAGNOSIS — Z95.2 S/P AORTIC VALVE REPLACEMENT: Primary | ICD-10-CM

## 2023-12-04 DIAGNOSIS — Z95.2 S/P MITRAL VALVE REPLACEMENT: ICD-10-CM

## 2023-12-04 DIAGNOSIS — G89.29 CHRONIC RIGHT-SIDED LOW BACK PAIN WITH RIGHT-SIDED SCIATICA: Primary | ICD-10-CM

## 2023-12-04 DIAGNOSIS — Z79.01 LONG TERM CURRENT USE OF ANTICOAGULANT THERAPY: ICD-10-CM

## 2023-12-04 DIAGNOSIS — M79.651 PAIN OF RIGHT THIGH: ICD-10-CM

## 2023-12-04 DIAGNOSIS — Z95.2 S/P AORTIC VALVE REPLACEMENT: ICD-10-CM

## 2023-12-04 DIAGNOSIS — M54.41 CHRONIC RIGHT-SIDED LOW BACK PAIN WITH RIGHT-SIDED SCIATICA: Primary | ICD-10-CM

## 2023-12-04 LAB — INR BLD: 2.1 (ref 0.9–1.1)

## 2023-12-04 PROCEDURE — 97110 THERAPEUTIC EXERCISES: CPT | Mod: GP | Performed by: PHYSICAL THERAPIST

## 2023-12-04 PROCEDURE — 36416 COLLJ CAPILLARY BLOOD SPEC: CPT

## 2023-12-04 PROCEDURE — 97162 PT EVAL MOD COMPLEX 30 MIN: CPT | Mod: GP | Performed by: PHYSICAL THERAPIST

## 2023-12-04 PROCEDURE — 85610 PROTHROMBIN TIME: CPT

## 2023-12-04 ASSESSMENT — ACTIVITIES OF DAILY LIVING (ADL)
GOING_UP_1_FLIGHT_OF_STAIRS: SLIGHT DIFFICULTY
WALKING_UP_STEEP_HILLS: MODERATE DIFFICULTY
HOS_ADL_SCORE(%): 63.24
WALKING_DOWN_STEEP_HILLS: MODERATE DIFFICULTY
RECREATIONAL_ACTIVITIES: MODERATE DIFFICULTY
PUTTING_ON_SOCKS_AND_SHOES: SLIGHT DIFFICULTY
WALKING_INITIALLY: SLIGHT DIFFICULTY
HOS_ADL_HIGHEST_POTENTIAL_SCORE: 68
STEPPING_UP_AND_DOWN_CURBS: NO DIFFICULTY AT ALL
STANDING_FOR_15_MINUTES: MODERATE DIFFICULTY
HOS_ADL_COUNT: 17
SITTING_FOR_15_MINUTES: NO DIFFICULTY AT ALL
DEEP_SQUATTING: MODERATE DIFFICULTY
GETTING_INTO_AND_OUT_OF_A_BATHTUB: NO DIFFICULTY AT ALL
GETTING_INTO_AND_OUT_OF_AN_AVERAGE_CAR: NO DIFFICULTY AT ALL
HOS_ADL_ITEM_SCORE_TOTAL: 43
GOING_DOWN_1_FLIGHT_OF_STAIRS: NO DIFFICULTY AT ALL
WALKING_APPROXIMATELY_10_MINUTES: EXTREME DIFFICULTY
HEAVY_WORK: EXTREME DIFFICULTY
WALKING_15_MINUTES_OR_GREATER: UNABLE TO DO
ROLLING_OVER_IN_BED: SLIGHT DIFFICULTY
TWISTING/PIVOTING_ON_INVOLVED_LEG: SLIGHT DIFFICULTY
HOW_WOULD_YOU_RATE_YOUR_CURRENT_LEVEL_OF_FUNCTION_DURING_YOUR_USUAL_ACTIVITIES_OF_DAILY_LIVING_FROM_0_TO_100_WITH_100_BEING_YOUR_LEVEL_OF_FUNCTION_PRIOR_TO_YOUR_HIP_PROBLEM_AND_0_BEING_THE_INABILITY_TO_PERFORM_ANY_OF_YOUR_USUAL_DAILY_ACTIVITIES?: 50
LIGHT_TO_MODERATE_WORK: SLIGHT DIFFICULTY

## 2023-12-04 NOTE — PROGRESS NOTES
ANTICOAGULATION MANAGEMENT     Fausto Farr 83 year old male is on warfarin with subtherapeutic INR result. (Goal INR 2.5-3.5)    Recent labs: (last 7 days)     12/04/23  0932   INR 2.1*       ASSESSMENT     Source(s): Chart Review and Patient/Caregiver Call     Warfarin doses taken: Held for supratherapeutic INR  recently which may be affecting INR, patient has not had full maintenance dose  Diet: No new diet changes identified  Medication/supplement changes: None noted  New illness, injury, or hospitalization: No  Signs or symptoms of bleeding or clotting: No  Previous result: Supratherapeutic  Additional findings: None       PLAN     Recommended plan for temporary change(s) affecting INR     Dosing Instructions: Continue your current warfarin dose with next INR in 3 days       Summary  As of 12/4/2023      Full warfarin instructions:  7.5 mg every Mon, Fri; 5 mg all other days   Next INR check:  12/7/2023               Telephone call with Ralph who verbalizes understanding and agrees to plan    Lab visit scheduled    Education provided:   Please call back if any changes to your diet, medications or how you've been taking warfarin  Contact [phone]  with any changes, questions or concerns.     Plan made per [facility] anticoagulation protocol    Cece Rios RN  Anticoagulation Clinic  12/4/2023    _______________________________________________________________________     Anticoagulation Episode Summary       Current INR goal:  2.5-3.5   TTR:  56.1% (12 mo)   Target end date:  Indefinite   Send INR reminders to:  SAMAG DELMER    Indications    S/P aortic valve replacement [Z95.2]  S/P mitral valve replacement [Z95.2]  Long term current use of anticoagulant therapy [Z79.01]  Longstanding persistent atrial fibrillation (H) [I48.11]             Comments:  Per 9/20/22 Cardio Note strict INR goal of 2.5-3.5. If INR falls below 2.5 at any time, needs to be bridged with Lovenox. consent to leave DVM for medical  information and scheduling             Anticoagulation Care Providers       Provider Role Specialty Phone number    Jodi Flower Mai, MD Referring Internal Medicine - Pediatrics [phone]

## 2023-12-04 NOTE — PROGRESS NOTES
PHYSICAL THERAPY EVALUATION  Type of Visit: Evaluation    See electronic medical record for Abuse and Falls Screening details.    Subjective       Presenting condition or subjective complaint: back and R leg    Patient complains of chronic R sided low back pain that radiates into R thigh and R anterior lateral shin. With numbness present into the shin. This pain increases when lifting/carrying heavier objects, when doing yard work and when walking. States sitting in a partial chair on his tractor, leaning back decreases the pain.     Date of onset: 11/21/23    Relevant medical history: Cancer; Hearing problems; High blood pressure; Implanted device; Pain at night or rest; Radiation treatment; Significant weakness   Dates & types of surgery: 2 open heart surgeries, AAA repair, R TKA x several wound debridements w/ distal limb muscle flap procedure    Prior diagnostic imaging/testing results: X-ray     Narrative & Impression   XR HIP RIGHT 2-3 VIEWS 11/21/2023 11:02 AM      HISTORY: Hip pain, right     COMPARISON: None.                                                                       IMPRESSION:     No acute fracture or dislocation. Mild degenerative changes in the  right hip. Brachytherapy seeds in the prostate. Vascular stents.  Lumbar spinal fusion hardware. Vascular calcifications.        Prior therapy history for the same diagnosis, illness or injury: No      Prior Level of Function  Transfers: Independent  Ambulation: Independent  ADL: Independent  IADL:     Living Environment  Social support: With a significant other or spouse   Type of home: House; 2-story   Stairs to enter the home: Yes 6 Is there a railing: Yes   Ramp: No   Stairs inside the home: Yes 8 Is there a railing: Yes   Help at home: None  Equipment owned: Walker; Walker with wheels; Crutches     Employment: No retired  Hobbies/Interests: cards, garden, yardwork    Patient goals for therapy: walk for longer distances    Pain assessment:  Location: R low back/Ratin/10     Objective   LUMBAR SPINE EVALUATION  PAIN: Pain Level with Use: 8/10  Pain Quality: Aching and stinging  INTEGUMENTARY (edema, incisions):   POSTURE: Standing Posture: Rounded shoulders, Forward head, Lordosis decreased, Lateral shift, forward trunk lean  GAIT:   Weightbearing Status:   Assistive Device(s): None  Gait Deviations:  Trendelenburg  BALANCE/PROPRIOCEPTION:   WEIGHTBEARING ALIGNMENT:   NON-WEIGHTBEARING ALIGNMENT:    ROM:   (Degrees) Left AROM Left PROM  Right AROM Right PROM   Hip Flexion  100  95   Hip Extension       Hip Abduction       Hip Adduction       Hip Internal Rotation  20  23   Hip External Rotation  30  21   Knee Flexion       Knee Extension       Lumbar Side glide     Lumbar Flexion Proximal shins   Lumbar Extension 15% R low back pain   Pain:   End feel:   PELVIC/SI SCREEN:   STRENGTH: Hip/Knee Strength   Right Left   Flexion 4 5   Extension     Abduction 3 -4   Adduction     External Rotation     Internal Rotation     Knee Flexion 5 5   Knee Extension +4 5   Quad Set     SLR           MYOTOMES:    Left Right   T12-L3 (Hip Flexion) 5 4   L2-4 (Quads)  5 4+   L4 (Ankle DF) 5 4+   L5 (Great Toe Ext) 5 5-   S1 (Toe Raise) 5 5     DTR S:   CORD SIGNS:   DERMATOMES:   NEURAL TENSION:   FLEXIBILITY: Decreased piriformis L, Decreased hamstrings L, Decreased piriformis R, Decreased hamstrings R  LUMBAR/HIP Special Tests:    Left Right   KELY Negative  Negative    FADIR/Labrum/LEWIS Negative  Negative    Femoral Nerve     Rubi's     Piriformis Negative  Negative    Quadrant Testing Negative  Negative    SLR Negative  Negative    Slump     Stork with Extension     He             PELVIS/SI SPECIAL TESTS:   FUNCTIONAL TESTS:   PALPATION:   SPINAL SEGMENTAL CONCLUSIONS:       Assessment & Plan   CLINICAL IMPRESSIONS  Medical Diagnosis: Hip pain, right  Pain of right thigh    Treatment Diagnosis: Hip pain, right  Pain of right thigh    chronic R low back pain    Impression/Assessment: Patient is a 83 year old male with R sided low back and r LE complaints.  The following significant findings have been identified: Pain, Decreased ROM/flexibility, Decreased joint mobility, Decreased strength, Impaired balance, Decreased proprioception, Impaired sensation, Impaired gait, Impaired muscle performance, Decreased activity tolerance, and Impaired posture. These impairments interfere with their ability to perform self care tasks, recreational activities, household chores, household mobility, and community mobility as compared to previous level of function.     Clinical Decision Making (Complexity):  Clinical Presentation: Evolving/Changing  Clinical Presentation Rationale: based on medical and personal factors listed in PT evaluation  Clinical Decision Making (Complexity): Moderate complexity    PLAN OF CARE  Treatment Interventions:  Modalities: Ultrasound  Interventions: Gait Training, Manual Therapy, Neuromuscular Re-education, Therapeutic Activity, Therapeutic Exercise, Self-Care/Home Management    Long Term Goals     PT Goal 1  Goal Identifier: walking  Goal Description: Patient will be able to walk 15 min in order for household and community mobiilty.  Goal Progress: 100-200 yards  Target Date: 01/29/24      Frequency of Treatment: 1x/wk  Duration of Treatment: 8 weeks    Recommended Referrals to Other Professionals:   Education Assessment:   Learner/Method: Patient;Listening;Demonstration;Pictures/Video  Education Comments: Educated on findings of exam, theory of centralization, importance of frequency of repeated motion exercises, expected frequency and duration of PT.    Risks and benefits of evaluation/treatment have been explained.   Patient/Family/caregiver agrees with Plan of Care.     Evaluation Time:     PT Eval, Moderate Complexity Minutes (59922): 28       Signing Clinician: Lianna Lucero PT      [facility]                                                                                    OUTPATIENT PHYSICAL THERAPY      PLAN OF TREATMENT FOR OUTPATIENT REHABILITATION   Patient's Last Name, First Name, Fausto Arreaga YOB: 1940   Provider's Name   [facility]   Medical Record No.  [id number]     Onset Date: 11/21/23  Start of Care Date: 12/04/23     Medical Diagnosis:  Hip pain, right  Pain of right thigh      PT Treatment Diagnosis:  Hip pain, right  Pain of right thigh    chronic R low back pain Plan of Treatment  Frequency/Duration: 1x/wk/ 8 weeks    Certification date from 12/04/23 to 01/29/24         See note for plan of treatment details and functional goals     Lianna Lucero, PT                         I CERTIFY THE NEED FOR THESE SERVICES FURNISHED UNDER        THIS PLAN OF TREATMENT AND WHILE UNDER MY CARE     (Physician attestation of this document indicates review and certification of the therapy plan).              Referring Provider:  Any Alfred    Initial Assessment  See Epic Evaluation- Start of Care Date: 12/04/23

## 2023-12-07 ENCOUNTER — OFFICE VISIT (OUTPATIENT)
Dept: VASCULAR SURGERY | Facility: CLINIC | Age: 83
End: 2023-12-07
Payer: MEDICARE

## 2023-12-07 DIAGNOSIS — I73.9 PVD (PERIPHERAL VASCULAR DISEASE) (H): ICD-10-CM

## 2023-12-07 DIAGNOSIS — I83.899 BLEEDING FROM VARICOSE VEIN: ICD-10-CM

## 2023-12-07 DIAGNOSIS — I83.90 VARICOSE VEINS OF CALF: ICD-10-CM

## 2023-12-07 DIAGNOSIS — I83.812 VARICOSE VEINS OF LEFT LOWER EXTREMITY WITH PAIN: Primary | ICD-10-CM

## 2023-12-07 PROCEDURE — 99203 OFFICE O/P NEW LOW 30 MIN: CPT | Performed by: SURGERY

## 2023-12-07 NOTE — LETTER
"    12/7/2023         RE: Fausto Farr  [address]  [place] [address]        Dear Colleague,    Thank you for referring your patient, Fausto Farr, to the [facility] [place]. Please see a copy of my visit note below.    I had the pleasure of seeing Mr. Pepe Farr in the vein clinic today.  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  He tells me that over the past year also the cluster of varicose vein has been getting increasingly painful.  He experiences throbbing as the day progresses.  He has tried elevation which has minimal mitigation of his symptoms.  He also experiences burning and pruritus.  Few days ago he was having significant pruritus and a cluster of varicose veins in his inner left thigh when he scratched on a superficial vein and that started bleeding significantly.  He had to apply pressure for a long duration for the bleeding to stop.    Intermittent leg elevation is helpful.    He tells me that he had some vein procedure done a few years ago but is not sure exactly what was done.  He reported that it \"had something to do with gluing the vein\"    On my examination he has varicosities in the left calf going on the inner side of the thigh and up to his upper thigh.  There are telltale signs of a spot which had bled and has a scab over it.    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    Plan: Explained the pathophysiology of disease to him in detail.  My recommendation is to get blood sonography of his left lower extremity venous system and plan on treating the varicose veins as necessary to prevent recurrent episodes of bleeding.    Again, thank you for allowing me to participate in the care of your patient.        Sincerely,        Vini Wu MD  "

## 2023-12-07 NOTE — PROGRESS NOTES
"I had the pleasure of seeing Mr. Pepe Farr in the vein clinic today.  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  He tells me that over the past year also the cluster of varicose vein has been getting increasingly painful.  He experiences throbbing as the day progresses.  He has tried elevation which has minimal mitigation of his symptoms.  He also experiences burning and pruritus.  Few days ago he was having significant pruritus and a cluster of varicose veins in his inner left thigh when he scratched on a superficial vein and that started bleeding significantly.  He had to apply pressure for a long duration for the bleeding to stop.    Intermittent leg elevation is helpful.    He tells me that he had some vein procedure done a few years ago but is not sure exactly what was done.  He reported that it \"had something to do with gluing the vein\"    On my examination he has varicosities in the left calf going on the inner side of the thigh and up to his upper thigh.  There are telltale signs of a spot which had bled and has a scab over it.    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    Plan: Explained the pathophysiology of disease to him in detail.  My recommendation is to get blood sonography of his left lower extremity venous system and plan on treating the varicose veins as necessary to prevent recurrent episodes of bleeding.  "

## 2023-12-07 NOTE — NURSING NOTE
Patient Reported symptoms:    Left Leg   Heaviness Some of the time   Achiness A good bit of the time   Swelling A little of the time   Throbbing A little of the time   Itching A good bit of the time   Appearance Very noticeable   Impact on work/activities Mildly reduced

## 2023-12-08 ENCOUNTER — LAB (OUTPATIENT)
Dept: LAB | Facility: CLINIC | Age: 83
End: 2023-12-08
Payer: MEDICARE

## 2023-12-08 ENCOUNTER — ANTICOAGULATION THERAPY VISIT (OUTPATIENT)
Dept: ANTICOAGULATION | Facility: CLINIC | Age: 83
End: 2023-12-08

## 2023-12-08 DIAGNOSIS — Z95.2 S/P MITRAL VALVE REPLACEMENT: ICD-10-CM

## 2023-12-08 DIAGNOSIS — Z95.2 S/P AORTIC VALVE REPLACEMENT: Primary | ICD-10-CM

## 2023-12-08 DIAGNOSIS — Z79.01 LONG TERM CURRENT USE OF ANTICOAGULANT THERAPY: ICD-10-CM

## 2023-12-08 DIAGNOSIS — I48.11 LONGSTANDING PERSISTENT ATRIAL FIBRILLATION (H): ICD-10-CM

## 2023-12-08 DIAGNOSIS — Z95.2 S/P AORTIC VALVE REPLACEMENT: ICD-10-CM

## 2023-12-08 LAB — INR BLD: 3.5 (ref 0.9–1.1)

## 2023-12-08 PROCEDURE — 85610 PROTHROMBIN TIME: CPT

## 2023-12-08 PROCEDURE — 36416 COLLJ CAPILLARY BLOOD SPEC: CPT

## 2023-12-08 NOTE — PROGRESS NOTES
ANTICOAGULATION MANAGEMENT     Fausto Farr 83 year old male is on warfarin with supratherapeutic INR result. (Goal INR 2.5-3.5)    Recent labs: (last 7 days)     12/08/23  0844   INR 3.5*       ASSESSMENT     Source(s): Chart Review and Patient/Caregiver Call     Warfarin doses taken: More warfarin taken than planned which may be affecting INR.  Patient took 7.5 mg last evening instead of 5 mg.  Warfarin was intentionally held on 12/1 when INR was critically elevated.  Diet: No new diet changes identified  Medication/supplement changes: None noted  New illness, injury, or hospitalization: No  Signs or symptoms of bleeding or clotting: No  Previous result: Subtherapeutic. INR increased significantly over the last 4 days.  Additional findings: None       PLAN     Recommended plan for no diet, medication or health factor changes affecting INR.  Warfarin dose was adjusted today to more closely match what patient has taken within the last week since INR is borderline therapeutic today and increased significantly since last check.     Dosing Instructions: decrease your warfarin dose (12.5% change) with next INR in 1 week       Summary  As of 12/8/2023      Full warfarin instructions:  5 mg every day   Next INR check:  12/15/2023               Telephone call with Ralph who agrees to plan and repeated back plan correctly    Lab visit scheduled    Education provided:   Please call back if any changes to your diet, medications or how you've been taking warfarin    Plan made with [facility] Pharmacist Irma Sanders RN  Anticoagulation Clinic  12/8/2023    _______________________________________________________________________     Anticoagulation Episode Summary       Current INR goal:  2.5-3.5   TTR:  55.8% (12 mo)   Target end date:  Indefinite   Send INR reminders to:  SHANNA DOWELL    Indications    S/P aortic valve replacement [Z95.2]  S/P mitral valve replacement [Z95.2]  Long term current use of  anticoagulant therapy [Z79.01]  Longstanding persistent atrial fibrillation (H) [I48.11]             Comments:  Per 9/20/22 Cardio Note strict INR goal of 2.5-3.5. If INR falls below 2.5 at any time, needs to be bridged with Lovenox. consent to leave DVM for medical information and scheduling             Anticoagulation Care Providers       Provider Role Specialty Phone number    Jodi Flower Mai, MD Referring Internal Medicine - Pediatrics [phone]

## 2023-12-11 ENCOUNTER — THERAPY VISIT (OUTPATIENT)
Dept: PHYSICAL THERAPY | Facility: CLINIC | Age: 83
End: 2023-12-11
Attending: PHYSICIAN ASSISTANT
Payer: MEDICARE

## 2023-12-11 DIAGNOSIS — M25.551 HIP PAIN, RIGHT: ICD-10-CM

## 2023-12-11 DIAGNOSIS — M79.651 PAIN OF RIGHT THIGH: Primary | ICD-10-CM

## 2023-12-11 DIAGNOSIS — M54.41 CHRONIC RIGHT-SIDED LOW BACK PAIN WITH RIGHT-SIDED SCIATICA: ICD-10-CM

## 2023-12-11 DIAGNOSIS — G89.29 CHRONIC RIGHT-SIDED LOW BACK PAIN WITH RIGHT-SIDED SCIATICA: ICD-10-CM

## 2023-12-11 PROCEDURE — 97110 THERAPEUTIC EXERCISES: CPT | Mod: GP | Performed by: PHYSICAL THERAPIST

## 2023-12-15 ENCOUNTER — ANTICOAGULATION THERAPY VISIT (OUTPATIENT)
Dept: ANTICOAGULATION | Facility: CLINIC | Age: 83
End: 2023-12-15

## 2023-12-15 ENCOUNTER — TELEPHONE (OUTPATIENT)
Dept: PEDIATRICS | Facility: CLINIC | Age: 83
End: 2023-12-15

## 2023-12-15 ENCOUNTER — LAB (OUTPATIENT)
Dept: LAB | Facility: CLINIC | Age: 83
End: 2023-12-15
Payer: MEDICARE

## 2023-12-15 DIAGNOSIS — I48.11 LONGSTANDING PERSISTENT ATRIAL FIBRILLATION (H): ICD-10-CM

## 2023-12-15 DIAGNOSIS — Z79.01 LONG TERM CURRENT USE OF ANTICOAGULANT THERAPY: ICD-10-CM

## 2023-12-15 DIAGNOSIS — Z95.2 S/P AORTIC VALVE REPLACEMENT: ICD-10-CM

## 2023-12-15 DIAGNOSIS — Z95.2 S/P MITRAL VALVE REPLACEMENT: ICD-10-CM

## 2023-12-15 DIAGNOSIS — Z95.2 S/P AORTIC VALVE REPLACEMENT: Primary | ICD-10-CM

## 2023-12-15 DIAGNOSIS — Z79.01 LONG TERM CURRENT USE OF ANTICOAGULANTS WITH INR GOAL OF 2.5-3.5: ICD-10-CM

## 2023-12-15 DIAGNOSIS — I48.19 PERSISTENT ATRIAL FIBRILLATION (H): ICD-10-CM

## 2023-12-15 LAB — INR BLD: 2.2 (ref 0.9–1.1)

## 2023-12-15 PROCEDURE — 36416 COLLJ CAPILLARY BLOOD SPEC: CPT

## 2023-12-15 PROCEDURE — 85610 PROTHROMBIN TIME: CPT

## 2023-12-15 RX ORDER — WARFARIN SODIUM 5 MG/1
TABLET ORAL
Qty: 95 TABLET | Refills: 1 | Status: SHIPPED | OUTPATIENT
Start: 2023-12-15 | End: 2024-06-14

## 2023-12-15 NOTE — TELEPHONE ENCOUNTER
Patient Returning Call    Reason for call:  INR     Information relayed to patient:  N/A    Patient has additional questions:  No      Could we send this information to you in [employer] or would you prefer to receive a phone call?:   Patient would prefer a phone call   Okay to leave a detailed message?: Yes at Home number on file [phone] (home)

## 2023-12-15 NOTE — PROGRESS NOTES
ANTICOAGULATION MANAGEMENT     Fausto Farr 83 year old male is on warfarin with subtherapeutic INR result. (Goal INR 2.5-3.5)    Recent labs: (last 7 days)     12/15/23  1002   INR 2.2*         ASSESSMENT     Source(s): Chart Review and Patient/Caregiver Call     Warfarin doses taken: Warfarin taken as instructed  Diet: No new diet changes identified  Medication/supplement changes: None noted  New illness, injury, or hospitalization: No  Signs or symptoms of bleeding or clotting: No  Previous result: Therapeutic last visit; previously outside of goal range  Additional findings: Refill needed today. Fausto meets all criteria for refill (current [facility] referral, office visit with referring provider/group in last 1 year unless directed to return in 2 years in last referring provider visit note, lab monitoring up to date or not exceeding 2 weeks overdue). Rx instructions and quantity supplied updated to match patient's current dosing plan. Warfarin 90 day supply with 1 refill granted per [facility] protocol  and Warfarin maintenance dose was decreased 12.5% at last visit       PLAN     Recommended plan for no diet, medication or health factor changes affecting INR     Dosing Instructions: Increase your warfarin dose (7% change) with next INR in 2 weeks       Summary  As of 12/15/2023      Full warfarin instructions:  7.5 mg every Fri; 5 mg all other days   Next INR check:  12/29/2023               Telephone call with Ralph who verbalizes understanding and agrees to plan and who agrees to plan and repeated back plan correctly    Lab visit scheduled    Education provided:   Taking warfarin: importance of following [facility] instructions vs instructions on the prescription bottle and Importance of taking warfarin as instructed    Plan made with [facility] Pharmacist Irma Sampson RN  Anticoagulation Clinic  12/15/2023    _______________________________________________________________________     Anticoagulation Episode  Summary       Current INR goal:  2.5-3.5   TTR:  55.4% (12 mo)   Target end date:  Indefinite   Send INR reminders to:  SHANNA DOWELL    Indications    S/P aortic valve replacement [Z95.2]  S/P mitral valve replacement [Z95.2]  Long term current use of anticoagulant therapy [Z79.01]  Longstanding persistent atrial fibrillation (H) [I48.11]             Comments:  Per 9/20/22 Cardio Note strict INR goal of 2.5-3.5. If INR falls below 2.5 at any time, needs to be bridged with Lovenox. consent to leave DVM for medical information and scheduling             Anticoagulation Care Providers       Provider Role Specialty Phone number    Jodi Flower Mai, MD Referring Internal Medicine - Pediatrics [phone]

## 2023-12-18 ENCOUNTER — THERAPY VISIT (OUTPATIENT)
Dept: PHYSICAL THERAPY | Facility: CLINIC | Age: 83
End: 2023-12-18
Attending: PHYSICIAN ASSISTANT
Payer: MEDICARE

## 2023-12-18 DIAGNOSIS — M79.651 PAIN OF RIGHT THIGH: Primary | ICD-10-CM

## 2023-12-18 DIAGNOSIS — M54.41 CHRONIC RIGHT-SIDED LOW BACK PAIN WITH RIGHT-SIDED SCIATICA: ICD-10-CM

## 2023-12-18 DIAGNOSIS — G89.29 CHRONIC RIGHT-SIDED LOW BACK PAIN WITH RIGHT-SIDED SCIATICA: ICD-10-CM

## 2023-12-18 DIAGNOSIS — M25.551 HIP PAIN, RIGHT: ICD-10-CM

## 2023-12-18 PROCEDURE — 97110 THERAPEUTIC EXERCISES: CPT | Mod: GP | Performed by: PHYSICAL THERAPIST

## 2023-12-18 PROCEDURE — 97112 NEUROMUSCULAR REEDUCATION: CPT | Mod: GP | Performed by: PHYSICAL THERAPIST

## 2023-12-27 ENCOUNTER — ANCILLARY PROCEDURE (OUTPATIENT)
Dept: CARDIOLOGY | Facility: CLINIC | Age: 83
End: 2023-12-27
Attending: INTERNAL MEDICINE
Payer: MEDICARE

## 2023-12-27 DIAGNOSIS — Z95.0 CARDIAC PACEMAKER IN SITU: ICD-10-CM

## 2023-12-27 PROCEDURE — 93296 REM INTERROG EVL PM/IDS: CPT | Performed by: INTERNAL MEDICINE

## 2023-12-27 PROCEDURE — 93294 REM INTERROG EVL PM/LDLS PM: CPT | Performed by: INTERNAL MEDICINE

## 2023-12-28 ENCOUNTER — ANCILLARY PROCEDURE (OUTPATIENT)
Dept: ULTRASOUND IMAGING | Facility: CLINIC | Age: 83
End: 2023-12-28
Attending: SURGERY
Payer: MEDICARE

## 2023-12-28 ENCOUNTER — OFFICE VISIT (OUTPATIENT)
Dept: VASCULAR SURGERY | Facility: CLINIC | Age: 83
End: 2023-12-28
Attending: SURGERY
Payer: MEDICARE

## 2023-12-28 DIAGNOSIS — I83.899 BLEEDING FROM VARICOSE VEIN: ICD-10-CM

## 2023-12-28 DIAGNOSIS — I83.812 VARICOSE VEINS OF LEFT LOWER EXTREMITY WITH PAIN: ICD-10-CM

## 2023-12-28 DIAGNOSIS — I83.892 BLEEDING FROM VARICOSE VEINS OF LEFT LOWER EXTREMITY: Primary | ICD-10-CM

## 2023-12-28 PROCEDURE — 93971 EXTREMITY STUDY: CPT | Mod: LT | Performed by: SURGERY

## 2023-12-28 PROCEDURE — 99213 OFFICE O/P EST LOW 20 MIN: CPT | Performed by: SURGERY

## 2023-12-28 NOTE — PROGRESS NOTES
December 28, 2023    Vein Procedure Recommendation    Spoke with patient and patient's wife in clinic.    Dr. Wu has recommended patient to have the following vein procedure(s):     1. Left leg VNUS closure GSV and 1 unit Phlebectomies (medically necessary)    Patient Pre-op Questions:  Preferred Pharmacy: [facility] in [place]  Anticoagulant/ASA: Yes, coumadin  Artificial Joint or Heart Valve:  Yes, mitral and aortic valve and hx of right TKA  Open ulcer:  No  Sedation nausea: No    Patient is recommended to wear Thigh High compression hose following his procedure. Discussed compression hose. Explained to patient if insurance doesn't cover the compression hose there are a couple different options to getting them and to call the clinic to let us know if they need help. Pt thinks he has some thigh high hose at home.    Handed patient written procedure instructions to review on his own (see After Visit Summary).    Next steps:    Insurance Submission  Informed patient this process could take up to 14 business days, but once approved, the patient will be contacted by our surgery scheduler to schedule the above procedure. Gave patient our surgery scheduler's information.    Patient and patient's wife are in agreement with all of the above and have no further questions at this time.    Jesi Sweet RN  [facility]  Vein Clinic

## 2023-12-28 NOTE — PATIENT INSTRUCTIONS
Pre-Procedure Instructions:                           VNUS Closure and Phlebectomies   You are having a Closure(s) - where one or more veins are closed and Phlebectomies - where one or more veins are removed.   Insurance  Precertification and/or referral authorization may be required by your insurance company.  We will call your insurance company to verify benefits for the medically necessary part of your procedure.    Your Current Medications and Allergies  To reduce bruising, please do not take aspirin-type medications (Motrin, Aleve, Ibuprofen, Advil, etc.) for three days before your procedure. You may take Tylenol if you need a pain reliever.  Are you on blood thinner medications? (Plavix, Coumadin, Eliquis, Xarelto) Please discuss this with your surgeon. You may resume taking your blood thinner medication after your procedure.  Are you sensitive to latex or adhesives used for fake fingernails? Please let us know!    Driving Escort and   Please arrange to have a trusted adult (18 years old or older) drive you to and from the clinic.  For your safety, we recommend you have a trusted adult to stay with you until the next morning.    Your Health  If you have a change in your health before the procedure, contact our office immediately. (For example: cold symptoms, cough, urinary tract infection, fever, flu symptoms.)  A pre-procedure physical is not required.    Note  It is sometimes necessary to adjust the procedure schedule due to emergencies. We greatly appreciate your flexibility and understanding in this matter.                  Check List: The Morning of Your Procedure  ___1. Please do not put anything on your leg(s) or shave the day of your procedure.  ___2. You may take your normal medications the day of your procedure.  ___3. It is recommended you eat a light breakfast or lunch the day of your procedure.  ___4. Wear comfortable loose-fitting clothing and wide-fitting shoes (i.e. tennis shoes,  slip-ons).  ___5. Please arrive at our clinic at the specified time given by the nurse.  ___6. You will sign an affirmation of informed consent.  ___7. Bring your pre-procedure sedation medication (lorazepam and clonidine) with you to the clinic.              One hour before your procedure, you will be instructed to take these medications. The lorazepam              (Ativan) lowers anxiety and sedates you; the clonidine makes the lorazepam more effective. Everyone's              body processes these medications differently. Therefore, reactions to these medications vary. Some              people stay awake and some people sleep through the whole procedure. You may not remember              everything about the procedure or the day. You do not want to make any big decisions for the rest of the              day.    The Day of Your Procedure:       VNUS Closure and Phlebectomies  In the Exam Room  A nurse will bring you back to an exam room with your family member or friend. This is when your informed consent will be signed, and you will take your pre-procedure medications.  You will be asked to remove everything from the waist down, including undergarments. You will then put on a hospital gown or shorts and blue booties.  Your surgeon will come in to answer any questions and zaira any bulging varicose veins to be removed.  You will be taken to the restroom to empty your bladder before going into the procedure room.    In the Procedure Room  You will be escorted to the procedure room. You will lie on a procedure table covered with a sheet or blanket.  A nurse will put a blood pressure cuff on your arm and a pulse/oxygen monitor on a finger. Your vital signs will be monitored every 15 minutes.  Your gown will be pulled up slightly and the groin exposed for a short period of time. The surgeon's assistant will clean your foot, leg, and groin with an antibacterial solution. We will get you covered up as quickly as  possible!  Sterile towels and blue drapes will be used to cover you and the table. You will be asked to keep your hands under the blue drapes during the procedure.  The lights will be turned down. The table will be tipped so your head is higher than your feet. You may feel like you're going to slide off, but you won't.    The Procedure  The surgeon will visualize your veins with an ultrasound machine. He or she will then numb your skin and access the vein. A catheter is passed up the vein and positioned with ultrasound guidance. The table will then be tipped head down.  Once the catheter is in the correct position, medication will be injected to numb your leg. You will feel some needle sticks and may feel discomfort as the medication goes in. Once this is done, you should not experience significant discomfort. But if you do, please let us know and more numbing medication can be injected. As the catheter sends out heat, the vein closes off and the catheter is withdrawn.  For the phlebectomy part of the procedure, small incisions are made where the bulging varicose veins have been marked on your leg(s) and these veins will be removed using a small mehran hook instrument.    Post-Procedure  Once the procedure is done, your leg(s) will be washed with warm water and dried. Your leg(s) will be bandaged with large soft dressings and a large ACE bandage wrapped from toes to groin.   You will be offered something to drink and a light snack.  You will rest with your leg(s) elevated for approximately 30 minutes. Your friend or family member may join you.  For your safety, you will be taken to your car in a wheelchair. If you are able to, it is good to keep your leg(s) elevated on the car ride home.    Post-Procedure Instructions:             VNUS Closure and Phlebectomies      Post-Op Day Zero - The Day of Your Procedure:0  1. Medication for Pain Control and Inflammation Control   - The numbing medication injected during your  procedure will last for several hours. The pre-procedure                 tablets may make you very sleepy and you might not remember everything from the procedure or from                 the day. This will usually wear off by the next day.   - Ibuprofen:  If tolerated, take ibuprofen (e.g., Advil) to reduce inflammation whether or not you have                 pain. For three days, take two tablets (200 mg each) with every meal and at bedtime with a snack. If                 your pain is not controlled with ibuprofen, you may take prescription pain medication (such as Norco),                 if prescribed.   - You may resume taking any medications you were taking before your procedure.  2. Activity   - Rest with your leg(s) elevated above your heart. This will prevent from a lot of swelling and                 bleeding. You do not need to elevate your leg(s) while sleeping at night. You may go upstairs, sit up to                 eat, use the bathroom, and take several five-minute walks. Otherwise, keep your leg(s) elevated.                 Minimize the amount of time you are up on your feet to about 30 minutes at a time.  3. Bandages   - The incision sites will be covered with soft bandages and an ACE wrap. Keep your bandages on                 and dry for 48 hours. The ACE should provide  snug  compression but should not cause pain or                 numbness in the toes. If you have significant discomfort or your toes become cold or numb, unwrap                 your ACE and rewrap with less tension starting at the toes wrapping upward.  4. Incisions   - Bleeding: You may see some incision sites that are oozing through the bandages. This is not unusual      and can be managed with Rest, Ice, Compression and Elevation (RICE). Apply ice and firm pressure      directly to the site that is bleeding and rest with your leg(s) elevated above your heart for 20-30                 minutes.    Post-Op Day One:  1.  Medication   - Ibuprofen: Continue the same as the Day of Your Procedure. If your pain is not controlled with                 ibuprofen, you may take prescription pain medication (such as Norco), if prescribed.   2. Activity   - We would like you to get up at least six times and walk around for short periods of time, unless it is      causing you pain. You should not be on your feet more than 90 minutes at a time. Elevate your leg      above your heart when you are not walking.  3. Bandages   - Your bandages must be kept on and dry for 48 hours.  4. Driving   - You may resume driving when you can do so safely. Do not drive if you are taking narcotic pain      medication.  Post-Op Day Two:   1. Medication  - Ibuprofen: Continue the same as the Day of Your Procedure.  2.  Activity  - Walk as tolerated. Elevate as much as possible when not walking.  3. Bandages and Compression  - Remove ACE wrap and padding. Shower and put on your compression hose during waking hours only       for at least 5 days. (Your doctor may instruct you to keep your bandages on until your return     appointment; please follow your doctor's instructions.)  4. Incisions  - Your leg(s) will be bruised; there may be swelling, hard knots under the skin and possibly some     numbness. These will likely resolve over time. If you see  hair-like  strings coming out of your     incisions, do not pull them (this will only cause pain/discomfort). We will trim them when you come     back for your follow-up appointment.  5. Call Us If:   - You see any areas on your leg that are red and angry in appearance.   - You notice any drainage that is milky or cloudy in appearance or that has a foul odor.   - You run a temperature of 100.5 or greater.    Post-Op Day Three:  You will have a follow up appointment 2-4 days post-procedure. At this appointment, you will have an ultrasound and we will check your incisions.     ________________________________________________________________________________________    The Two Weeks Following Your Procedure  1.  Skin Care   - Do not use any lotions, creams or powders on your incisions for 14 days or until the incisions have                 healed.   - Do not soak in a bathtub, hot tub or go swimming for 14 days or until your incisions have healed.  2.  Medications   - You may use ibuprofen or acetaminophen (e.g., Tylenol) as needed for pain or discomfort.  3.  Activity   - Do not lift over 25 pounds. After about two weeks you may resume exercise such as aerobics,                 running, tennis or weightlifting. Use your common sense and ease back into your exercise routine                 slowly.   - You may feel a cord-like tightness along the inside of your leg. Gentle stretching can be helpful.  4. Compression Hose   - Your doctor may instruct you to wear compression for longer than seven days; please follow your                 doctor's instructions. As a comfort measure, you may choose to wear compression for longer than                 required.  5.  Travel   - Do not fly in an airplane for 14 days after your procedure. If you have a long car trip planned within                 two to three weeks following your procedure, stop and walk for a few minutes every two hours.      Periodic ankle pumps during the ride may be helpful.    Six Week Appointment  - At your six-week appointment, you will see your surgeon for an exam and evaluation. This office visit     will be scheduled when you return for post-op day three return appointment.       Return to Work  1.  If you work outside the home, you may return to work in a few days depending on the extent of your        procedure, how you tolerate it, and the type of work you perform.  2.  Paperwork: If your employer requires paperwork or you would like a letter written to your employer, please        let us know. We will complete  disability type forms at no charge. Please allow five business days for forms        to be completed.

## 2023-12-28 NOTE — PROGRESS NOTES
"Mr. Enriquez Yordan clinic today.    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  He tells me that over the past year also the cluster of varicose vein has been getting increasingly painful.  He experiences throbbing as the day progresses.  He has tried elevation which has minimal mitigation of his symptoms.  He also experiences burning and pruritus.  Few days ago he was having significant pruritus and a cluster of varicose veins in his inner left thigh when he scratched on a superficial vein and that started bleeding significantly.  He had to apply pressure for a long duration for the bleeding to stop.     Intermittent leg elevation is helpful.     He tells me that he had some vein procedure done a few years ago but is not sure exactly what was done.  He reported that it \"had something to do with gluing the vein\"     On my examination he has varicosities in the left calf going on the inner side of the thigh and up to his upper thigh.  There are telltale signs of a spot which had bled and has a scab over it.    He underwent sonography today.    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 The great saphenous vein measures 10.2 mm at the saphenofemoral junction, 9.7 mm at the proximal thigh, and is closed at the knee. The GSV is incompetent from SFJ to distal thigh , with the greatest reflux time of 5592 milliseconds.  The GSV gives rise to a varicose branch measuring 8.6 mm off the Mid Thigh that courses Toward the ankle with a reflux time of 4353 milliseconds.       Diagnosis: Bleeding from left lower extremity varicose vein.    Plan: Explained the pathophysiology of disease to him in detail.  His great saphenous vein which was previously treated with VenaSeal has recanalized.  My proposal would be to do a radiofrequency " ablation of the left great saphenous vein and stab phlebectomies of the large ropey varicosities in the thigh and calf to minimize the venous hypertension and chances of bleeding.  All of this was explained to him in detail.  We will submit this to his insurance company.

## 2023-12-28 NOTE — LETTER
"    12/28/2023         RE: Fausto Farr  [address]  [place] [address]        Dear Colleague,    Thank you for referring your patient, Fausto Farr, to the [facility]. Please see a copy of my visit note below.    Mr. Zoe Farr clinic today.    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  He tells me that over the past year also the cluster of varicose vein has been getting increasingly painful.  He experiences throbbing as the day progresses.  He has tried elevation which has minimal mitigation of his symptoms.  He also experiences burning and pruritus.  Few days ago he was having significant pruritus and a cluster of varicose veins in his inner left thigh when he scratched on a superficial vein and that started bleeding significantly.  He had to apply pressure for a long duration for the bleeding to stop.     Intermittent leg elevation is helpful.     He tells me that he had some vein procedure done a few years ago but is not sure exactly what was done.  He reported that it \"had something to do with gluing the vein\"     On my examination he has varicosities in the left calf going on the inner side of the thigh and up to his upper thigh.  There are telltale signs of a spot which had bled and has a scab over it.    He underwent sonography today.    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 The great saphenous vein measures 10.2 mm at the saphenofemoral junction, 9.7 mm at the proximal thigh, and is closed at the knee. The GSV is incompetent from SFJ to distal thigh , with the greatest reflux time of 5592 milliseconds.  The GSV gives rise to a varicose branch measuring 8.6 mm off the Mid Thigh that courses Toward the ankle with a reflux time of 4353 milliseconds.       Diagnosis: Bleeding " from left lower extremity varicose vein.    Plan: Explained the pathophysiology of disease to him in detail.  His great saphenous vein which was previously treated with VenaSeal has recanalized.  My proposal would be to do a radiofrequency ablation of the left great saphenous vein and stab phlebectomies of the large ropey varicosities in the thigh and calf to minimize the venous hypertension and chances of bleeding.  All of this was explained to him in detail.  We will submit this to his insurance company.    Again, thank you for allowing me to participate in the care of your patient.        Sincerely,        Vini Wu MD

## 2023-12-29 ENCOUNTER — TELEPHONE (OUTPATIENT)
Dept: PEDIATRICS | Facility: CLINIC | Age: 83
End: 2023-12-29

## 2023-12-29 ENCOUNTER — LAB (OUTPATIENT)
Dept: LAB | Facility: CLINIC | Age: 83
End: 2023-12-29
Payer: MEDICARE

## 2023-12-29 ENCOUNTER — ANTICOAGULATION THERAPY VISIT (OUTPATIENT)
Dept: ANTICOAGULATION | Facility: CLINIC | Age: 83
End: 2023-12-29

## 2023-12-29 DIAGNOSIS — Z95.2 S/P AORTIC VALVE REPLACEMENT: Primary | ICD-10-CM

## 2023-12-29 DIAGNOSIS — Z95.2 S/P MITRAL VALVE REPLACEMENT: ICD-10-CM

## 2023-12-29 DIAGNOSIS — I50.22 CHRONIC SYSTOLIC CONGESTIVE HEART FAILURE (H): Primary | ICD-10-CM

## 2023-12-29 DIAGNOSIS — Z95.2 S/P AORTIC VALVE REPLACEMENT: ICD-10-CM

## 2023-12-29 DIAGNOSIS — Z79.01 LONG TERM CURRENT USE OF ANTICOAGULANT THERAPY: ICD-10-CM

## 2023-12-29 DIAGNOSIS — I48.11 LONGSTANDING PERSISTENT ATRIAL FIBRILLATION (H): ICD-10-CM

## 2023-12-29 LAB
INR BLD: 1.9 (ref 0.9–1.1)
MDC_IDC_EPISODE_DTM: NORMAL
MDC_IDC_EPISODE_DURATION: 1 S
MDC_IDC_EPISODE_DURATION: 15 S
MDC_IDC_EPISODE_DURATION: 15 S
MDC_IDC_EPISODE_DURATION: 16 S
MDC_IDC_EPISODE_DURATION: 17 S
MDC_IDC_EPISODE_DURATION: 2 S
MDC_IDC_EPISODE_DURATION: 6 S
MDC_IDC_EPISODE_ID: NORMAL
MDC_IDC_EPISODE_TYPE: NORMAL
MDC_IDC_EPISODE_TYPE_INDUCED: NO
MDC_IDC_LEAD_CONNECTION_STATUS: NORMAL
MDC_IDC_LEAD_CONNECTION_STATUS: NORMAL
MDC_IDC_LEAD_IMPLANT_DT: NORMAL
MDC_IDC_LEAD_IMPLANT_DT: NORMAL
MDC_IDC_LEAD_LOCATION: NORMAL
MDC_IDC_LEAD_LOCATION: NORMAL
MDC_IDC_LEAD_LOCATION_DETAIL_1: NORMAL
MDC_IDC_LEAD_LOCATION_DETAIL_1: NORMAL
MDC_IDC_LEAD_MFG: NORMAL
MDC_IDC_LEAD_MFG: NORMAL
MDC_IDC_LEAD_MODEL: NORMAL
MDC_IDC_LEAD_MODEL: NORMAL
MDC_IDC_LEAD_POLARITY_TYPE: NORMAL
MDC_IDC_LEAD_POLARITY_TYPE: NORMAL
MDC_IDC_LEAD_SERIAL: NORMAL
MDC_IDC_LEAD_SERIAL: NORMAL
MDC_IDC_MSMT_BATTERY_DTM: NORMAL
MDC_IDC_MSMT_BATTERY_REMAINING_LONGEVITY: 144 MO
MDC_IDC_MSMT_BATTERY_REMAINING_PERCENTAGE: 100 %
MDC_IDC_MSMT_BATTERY_STATUS: NORMAL
MDC_IDC_MSMT_LEADCHNL_RA_IMPEDANCE_VALUE: 699 OHM
MDC_IDC_MSMT_LEADCHNL_RA_PACING_THRESHOLD_AMPLITUDE: 0.5 V
MDC_IDC_MSMT_LEADCHNL_RA_PACING_THRESHOLD_PULSEWIDTH: 0.4 MS
MDC_IDC_MSMT_LEADCHNL_RV_IMPEDANCE_VALUE: 579 OHM
MDC_IDC_MSMT_LEADCHNL_RV_PACING_THRESHOLD_AMPLITUDE: 1 V
MDC_IDC_MSMT_LEADCHNL_RV_PACING_THRESHOLD_PULSEWIDTH: 0.4 MS
MDC_IDC_PG_IMPLANT_DTM: NORMAL
MDC_IDC_PG_MFG: NORMAL
MDC_IDC_PG_MODEL: NORMAL
MDC_IDC_PG_SERIAL: NORMAL
MDC_IDC_PG_TYPE: NORMAL
MDC_IDC_SESS_CLINIC_NAME: NORMAL
MDC_IDC_SESS_DTM: NORMAL
MDC_IDC_SESS_TYPE: NORMAL
MDC_IDC_SET_BRADY_AT_MODE_SWITCH_MODE: NORMAL
MDC_IDC_SET_BRADY_AT_MODE_SWITCH_RATE: 170 {BEATS}/MIN
MDC_IDC_SET_BRADY_LOWRATE: 60 {BEATS}/MIN
MDC_IDC_SET_BRADY_MAX_SENSOR_RATE: 130 {BEATS}/MIN
MDC_IDC_SET_BRADY_MAX_TRACKING_RATE: 130 {BEATS}/MIN
MDC_IDC_SET_BRADY_MODE: NORMAL
MDC_IDC_SET_BRADY_PAV_DELAY_HIGH: 200 MS
MDC_IDC_SET_BRADY_PAV_DELAY_LOW: 250 MS
MDC_IDC_SET_BRADY_SAV_DELAY_HIGH: 200 MS
MDC_IDC_SET_BRADY_SAV_DELAY_LOW: 250 MS
MDC_IDC_SET_LEADCHNL_RA_PACING_AMPLITUDE: 2 V
MDC_IDC_SET_LEADCHNL_RA_PACING_CAPTURE_MODE: NORMAL
MDC_IDC_SET_LEADCHNL_RA_PACING_POLARITY: NORMAL
MDC_IDC_SET_LEADCHNL_RA_PACING_PULSEWIDTH: 0.4 MS
MDC_IDC_SET_LEADCHNL_RA_SENSING_ADAPTATION_MODE: NORMAL
MDC_IDC_SET_LEADCHNL_RA_SENSING_POLARITY: NORMAL
MDC_IDC_SET_LEADCHNL_RA_SENSING_SENSITIVITY: 0.25 MV
MDC_IDC_SET_LEADCHNL_RV_PACING_AMPLITUDE: 1.5 V
MDC_IDC_SET_LEADCHNL_RV_PACING_CAPTURE_MODE: NORMAL
MDC_IDC_SET_LEADCHNL_RV_PACING_POLARITY: NORMAL
MDC_IDC_SET_LEADCHNL_RV_PACING_PULSEWIDTH: 0.4 MS
MDC_IDC_SET_LEADCHNL_RV_SENSING_ADAPTATION_MODE: NORMAL
MDC_IDC_SET_LEADCHNL_RV_SENSING_POLARITY: NORMAL
MDC_IDC_SET_LEADCHNL_RV_SENSING_SENSITIVITY: 0.6 MV
MDC_IDC_SET_ZONE_DETECTION_INTERVAL: 375 MS
MDC_IDC_SET_ZONE_STATUS: NORMAL
MDC_IDC_SET_ZONE_TYPE: NORMAL
MDC_IDC_SET_ZONE_VENDOR_TYPE: NORMAL
MDC_IDC_STAT_AT_BURDEN_PERCENT: 1 %
MDC_IDC_STAT_AT_DTM_END: NORMAL
MDC_IDC_STAT_AT_DTM_START: NORMAL
MDC_IDC_STAT_BRADY_DTM_END: NORMAL
MDC_IDC_STAT_BRADY_DTM_START: NORMAL
MDC_IDC_STAT_BRADY_RA_PERCENT_PACED: 20 %
MDC_IDC_STAT_BRADY_RV_PERCENT_PACED: 93 %
MDC_IDC_STAT_EPISODE_RECENT_COUNT: 0
MDC_IDC_STAT_EPISODE_RECENT_COUNT: 0
MDC_IDC_STAT_EPISODE_RECENT_COUNT: 4
MDC_IDC_STAT_EPISODE_RECENT_COUNT_DTM_END: NORMAL
MDC_IDC_STAT_EPISODE_RECENT_COUNT_DTM_START: NORMAL
MDC_IDC_STAT_EPISODE_TYPE: NORMAL
MDC_IDC_STAT_EPISODE_VENDOR_TYPE: NORMAL
MDC_IDC_STAT_EPISODE_VENDOR_TYPE: NORMAL

## 2023-12-29 PROCEDURE — 36416 COLLJ CAPILLARY BLOOD SPEC: CPT

## 2023-12-29 PROCEDURE — 85610 PROTHROMBIN TIME: CPT

## 2023-12-29 RX ORDER — ENOXAPARIN SODIUM 100 MG/ML
100 INJECTION SUBCUTANEOUS EVERY 12 HOURS
Qty: 28 ML | Refills: 1 | Status: CANCELLED | OUTPATIENT
Start: 2023-12-29

## 2023-12-29 NOTE — PROGRESS NOTES
ANTICOAGULATION MANAGEMENT     Fausto Farr 83 year old male is on warfarin with subtherapeutic INR result. (Goal INR 2.5-3.5)    Recent labs: (last 7 days)     12/29/23  0920   INR 1.9*       ASSESSMENT     Source(s): Chart Review and Patient/Caregiver Call     Warfarin doses taken: Warfarin taken as instructed  Diet: Increased greens/vitamin K in diet; plans to resume previous intake--however, patient reports he ate more iceberg lettuce than usual, this should have little impact on INR due to low vitamin K content.  Medication/supplement changes: None noted  New illness, injury, or hospitalization: No  Signs or symptoms of bleeding or clotting: No  Previous result: Subtherapeutic  Additional findings: Warfarin maintenance dose was 7% at last visit  Vascular office visit on 12/28/23-needs another vein procedure, waiting for insurance authorization.  ZACHARY Solis suggested patient bridge with lovenox starting today, after discussing reasons why with patient, he refused. Patient aware cardiologist will be informed.    ASSESSMENT        PLAN     Recommended plan for no diet, medication or health factor changes affecting INR     Dosing Instructions: booster dose then Increase your warfarin dose (13% change) with next INR in 5 days       Summary  As of 12/29/2023      Full warfarin instructions:  12/29: 10 mg; Otherwise 7.5 mg every Mon, Wed, Fri; 5 mg all other days   Next INR check:  1/3/2024               Telephone call with Ralph who verbalizes understanding and agrees to plan and who agrees to plan and repeated back plan correctly    Lab visit scheduled    Education provided:   Lovenox/Heparin education provided: role of enoxaparin/heparin in bridge therapy     Plan made with [facility] Pharmacist Irma Sampson, RN  Anticoagulation Clinic  12/29/2023    _______________________________________________________________________     Anticoagulation Episode Summary       Current INR goal:  2.5-3.5   TTR:  54.6%  (12 mo)   Target end date:  Indefinite   Send INR reminders to:  SHANNA DOWELL    Indications    S/P aortic valve replacement [Z95.2]  S/P mitral valve replacement [Z95.2]  Long term current use of anticoagulant therapy [Z79.01]  Longstanding persistent atrial fibrillation (H) [I48.11]             Comments:  Per 9/20/22 Cardio Note strict INR goal of 2.5-3.5. If INR falls below 2.5 at any time, needs to be bridged with Lovenox. consent to leave DVM for medical information and scheduling             Anticoagulation Care Providers       Provider Role Specialty Phone number    Jodi Flower Mai, MD Referring Internal Medicine - Pediatrics [phone]

## 2023-12-29 NOTE — TELEPHONE ENCOUNTER
Patient Returning Call    Reason for call:  INR     Information relayed to patient:  N/A    Patient has additional questions:  No      Could we send this information to you in [email or web address] or would you prefer to receive a phone call?:   Patient would prefer a phone call   Okay to leave a detailed message?: Yes at Home number on file [phone] (home)

## 2023-12-29 NOTE — PROGRESS NOTES
"Estimated Creatinine Clearance: 66.2 mL/min (based on SCr of 0.94 mg/dL).  Estimated body mass index is 35.38 kg/m  as calculated from the following:    Height as of 10/24/23: 1.669 m (5' 5.71\").    Weight as of 11/21/23: 98.6 kg (217 lb 4.8 oz).    Advise bridge with enoxaparin 100 mg subcutaneous Q12h for subtherapeutic INR in the presence of dual mechanical valves.     Irma Salas, PharmD, BCPS    "

## 2023-12-29 NOTE — TELEPHONE ENCOUNTER
Per [facility] protocol and [facility] Irma's recommendation, patient should bridge with lovenox due to subtherapeutic INR of 1.9 today; however, patient refused.  Warfarin maintenance dose was increased with booster dose today, next INR scheduled for 1/3/24.    Routing to PCP and cardiologist for FYI.    Thank you,  Aure Sampson RN  Anticoagulation Clinic

## 2024-01-03 ENCOUNTER — ANTICOAGULATION THERAPY VISIT (OUTPATIENT)
Dept: ANTICOAGULATION | Facility: CLINIC | Age: 84
End: 2024-01-03

## 2024-01-03 ENCOUNTER — LAB (OUTPATIENT)
Dept: LAB | Facility: CLINIC | Age: 84
End: 2024-01-03
Payer: MEDICARE

## 2024-01-03 DIAGNOSIS — I48.11 LONGSTANDING PERSISTENT ATRIAL FIBRILLATION (H): ICD-10-CM

## 2024-01-03 DIAGNOSIS — Z79.01 LONG TERM CURRENT USE OF ANTICOAGULANT THERAPY: ICD-10-CM

## 2024-01-03 DIAGNOSIS — Z95.2 S/P AORTIC VALVE REPLACEMENT: Primary | ICD-10-CM

## 2024-01-03 DIAGNOSIS — Z95.2 S/P AORTIC VALVE REPLACEMENT: ICD-10-CM

## 2024-01-03 DIAGNOSIS — Z95.2 S/P MITRAL VALVE REPLACEMENT: ICD-10-CM

## 2024-01-03 LAB — INR BLD: 3.1 (ref 0.9–1.1)

## 2024-01-03 PROCEDURE — 85610 PROTHROMBIN TIME: CPT

## 2024-01-03 PROCEDURE — 36416 COLLJ CAPILLARY BLOOD SPEC: CPT

## 2024-01-03 NOTE — PROGRESS NOTES
ANTICOAGULATION MANAGEMENT     Fausto Farr 83 year old male is on warfarin with therapeutic INR result. (Goal INR 2.5-3.5)    Recent labs: (last 7 days)     01/03/24  0918   INR 3.1*       ASSESSMENT     Source(s): Chart Review and Patient/Caregiver Call     Warfarin doses taken: Warfarin taken as instructed  Diet: No new diet changes identified  Medication/supplement changes: None noted  New illness, injury, or hospitalization: No  Signs or symptoms of bleeding or clotting: No  Previous result: Subtherapeutic  Additional findings: still waiting to hear from vascular to see if he will be having a vein procedure.         PLAN     Recommended plan for no diet, medication or health factor changes affecting INR     Dosing Instructions: Continue your current warfarin dose with next INR in 2 weeks       Summary  As of 1/3/2024      Full warfarin instructions:  7.5 mg every Mon, Wed, Fri; 5 mg all other days   Next INR check:  1/17/2024               Telephone call with Ralph who agrees to plan and repeated back plan correctly    Lab visit scheduled    Education provided:   Please call back if any changes to your diet, medications or how you've been taking warfarin  Goal range and lab monitoring: goal range and significance of current result  Importance of notifying anticoagulation clinic for: upcoming surgeries and procedures 2 weeks in advance    Plan made per [facility] anticoagulation protocol    Ruthann Sanders RN  Anticoagulation Clinic  1/3/2024    _______________________________________________________________________     Anticoagulation Episode Summary       Current INR goal:  2.5-3.5   TTR:  55.3% (12 mo)   Target end date:  Indefinite   Send INR reminders to:  SHANNA DOWELL    Indications    S/P aortic valve replacement [Z95.2]  S/P mitral valve replacement [Z95.2]  Long term current use of anticoagulant therapy [Z79.01]  Longstanding persistent atrial fibrillation (H) [I48.11]             Comments:  Per 9/20/22  Cardio Note strict INR goal of 2.5-3.5. If INR falls below 2.5 at any time, needs to be bridged with Lovenox. consent to leave DVM for medical information and scheduling             Anticoagulation Care Providers       Provider Role Specialty Phone number    Jodi Flower Mai, MD Referring Internal Medicine - Pediatrics [phone]

## 2024-01-08 ENCOUNTER — THERAPY VISIT (OUTPATIENT)
Dept: PHYSICAL THERAPY | Facility: CLINIC | Age: 84
End: 2024-01-08
Payer: MEDICARE

## 2024-01-08 DIAGNOSIS — G89.29 CHRONIC RIGHT-SIDED LOW BACK PAIN WITH RIGHT-SIDED SCIATICA: ICD-10-CM

## 2024-01-08 DIAGNOSIS — M54.41 CHRONIC RIGHT-SIDED LOW BACK PAIN WITH RIGHT-SIDED SCIATICA: ICD-10-CM

## 2024-01-08 DIAGNOSIS — M79.651 PAIN OF RIGHT THIGH: Primary | ICD-10-CM

## 2024-01-08 DIAGNOSIS — M25.551 HIP PAIN, RIGHT: ICD-10-CM

## 2024-01-08 PROCEDURE — 97112 NEUROMUSCULAR REEDUCATION: CPT | Mod: GP | Performed by: PHYSICAL THERAPIST

## 2024-01-08 PROCEDURE — 97110 THERAPEUTIC EXERCISES: CPT | Mod: GP | Performed by: PHYSICAL THERAPIST

## 2024-01-09 ENCOUNTER — TRANSFERRED RECORDS (OUTPATIENT)
Dept: HEALTH INFORMATION MANAGEMENT | Facility: CLINIC | Age: 84
End: 2024-01-09
Payer: MEDICARE

## 2024-01-15 ENCOUNTER — THERAPY VISIT (OUTPATIENT)
Dept: PHYSICAL THERAPY | Facility: CLINIC | Age: 84
End: 2024-01-15
Payer: MEDICARE

## 2024-01-15 DIAGNOSIS — M25.551 HIP PAIN, RIGHT: ICD-10-CM

## 2024-01-15 DIAGNOSIS — M54.41 CHRONIC RIGHT-SIDED LOW BACK PAIN WITH RIGHT-SIDED SCIATICA: ICD-10-CM

## 2024-01-15 DIAGNOSIS — M79.651 PAIN OF RIGHT THIGH: Primary | ICD-10-CM

## 2024-01-15 DIAGNOSIS — G89.29 CHRONIC RIGHT-SIDED LOW BACK PAIN WITH RIGHT-SIDED SCIATICA: ICD-10-CM

## 2024-01-15 PROCEDURE — 97112 NEUROMUSCULAR REEDUCATION: CPT | Mod: GP | Performed by: PHYSICAL THERAPIST

## 2024-01-15 PROCEDURE — 97110 THERAPEUTIC EXERCISES: CPT | Mod: GP | Performed by: PHYSICAL THERAPIST

## 2024-01-17 ENCOUNTER — LAB (OUTPATIENT)
Dept: LAB | Facility: CLINIC | Age: 84
End: 2024-01-17
Payer: MEDICARE

## 2024-01-17 ENCOUNTER — ANTICOAGULATION THERAPY VISIT (OUTPATIENT)
Dept: ANTICOAGULATION | Facility: CLINIC | Age: 84
End: 2024-01-17

## 2024-01-17 DIAGNOSIS — Z95.2 S/P AORTIC VALVE REPLACEMENT: Primary | ICD-10-CM

## 2024-01-17 DIAGNOSIS — Z95.2 S/P AORTIC VALVE REPLACEMENT: ICD-10-CM

## 2024-01-17 DIAGNOSIS — Z95.2 S/P MITRAL VALVE REPLACEMENT: ICD-10-CM

## 2024-01-17 DIAGNOSIS — I48.11 LONGSTANDING PERSISTENT ATRIAL FIBRILLATION (H): ICD-10-CM

## 2024-01-17 DIAGNOSIS — Z79.01 LONG TERM CURRENT USE OF ANTICOAGULANT THERAPY: ICD-10-CM

## 2024-01-17 DIAGNOSIS — E11.9 DIABETES MELLITUS, TYPE 2 (H): ICD-10-CM

## 2024-01-17 DIAGNOSIS — I10 ESSENTIAL HYPERTENSION, BENIGN: Primary | ICD-10-CM

## 2024-01-17 LAB — INR BLD: 2 (ref 0.9–1.1)

## 2024-01-17 PROCEDURE — 85610 PROTHROMBIN TIME: CPT

## 2024-01-17 PROCEDURE — 36416 COLLJ CAPILLARY BLOOD SPEC: CPT

## 2024-01-17 NOTE — PROGRESS NOTES
ANTICOAGULATION MANAGEMENT     Fausto Farr 83 year old male is on warfarin with subtherapeutic INR result. (Goal INR 2.5-3.5)    Recent labs: (last 7 days)     01/17/24  0906   INR 2.0*       ASSESSMENT     Source(s): Chart Review and Patient/Caregiver Call     Warfarin doses taken: Missed dose(s) may be affecting INR, patient reports he missed his warfarin dose last night but took it this morning  Diet: No new diet changes identified  Medication/supplement changes: None noted  New illness, injury, or hospitalization: No  Signs or symptoms of bleeding or clotting: No  Previous result: Therapeutic last visit; previously outside of goal range  Additional findings: Upcoming surgery/procedure will have cataract surgery redone, has not heard back on vascular surgery yet       PLAN     Recommended plan for temporary change(s) affecting INR     Dosing Instructions: Increase your warfarin dose (11.8% change) with next INR in 1-2 weeks       Summary  As of 1/17/2024      Full warfarin instructions:  1/17: 12.5 mg; Otherwise 5 mg every Mon, Fri; 7.5 mg all other days   Next INR check:  1/29/2024               Telephone call with Ralph who verbalizes understanding and agrees to plan    Lab visit scheduled    Education provided:   Please call back if any changes to your diet, medications or how you've been taking warfarin  Contact [phone]  with any changes, questions or concerns.     Plan made per [facility] anticoagulation protocol    Cece Rios RN  Anticoagulation Clinic  1/17/2024    _______________________________________________________________________     Anticoagulation Episode Summary       Current INR goal:  2.5-3.5   TTR:  55.8% (1 y)   Target end date:  Indefinite   Send INR reminders to:  SAMAG DELMER    Indications    S/P aortic valve replacement [Z95.2]  S/P mitral valve replacement [Z95.2]  Long term current use of anticoagulant therapy [Z79.01]  Longstanding persistent atrial fibrillation (H)  [I48.11]             Comments:  Per 9/20/22 Cardio Note strict INR goal of 2.5-3.5. If INR falls below 2.5 at any time, needs to be bridged with Lovenox. consent to leave DVM for medical information and scheduling             Anticoagulation Care Providers       Provider Role Specialty Phone number    Jodi Flower Mai, MD Referring Internal Medicine - Pediatrics [phone]

## 2024-01-18 DIAGNOSIS — I83.892 BLEEDING FROM VARICOSE VEINS OF LEFT LOWER EXTREMITY: Primary | ICD-10-CM

## 2024-01-29 ENCOUNTER — LAB (OUTPATIENT)
Dept: LAB | Facility: CLINIC | Age: 84
End: 2024-01-29
Payer: MEDICARE

## 2024-01-29 ENCOUNTER — TELEPHONE (OUTPATIENT)
Dept: PEDIATRICS | Facility: CLINIC | Age: 84
End: 2024-01-29

## 2024-01-29 ENCOUNTER — ANTICOAGULATION THERAPY VISIT (OUTPATIENT)
Dept: ANTICOAGULATION | Facility: CLINIC | Age: 84
End: 2024-01-29

## 2024-01-29 DIAGNOSIS — I48.11 LONGSTANDING PERSISTENT ATRIAL FIBRILLATION (H): ICD-10-CM

## 2024-01-29 DIAGNOSIS — Z79.01 LONG TERM CURRENT USE OF ANTICOAGULANT THERAPY: ICD-10-CM

## 2024-01-29 DIAGNOSIS — Z95.2 S/P AORTIC VALVE REPLACEMENT: ICD-10-CM

## 2024-01-29 DIAGNOSIS — Z95.2 S/P MITRAL VALVE REPLACEMENT: Primary | ICD-10-CM

## 2024-01-29 DIAGNOSIS — Z95.2 S/P AORTIC VALVE REPLACEMENT: Primary | ICD-10-CM

## 2024-01-29 DIAGNOSIS — Z95.2 S/P MITRAL VALVE REPLACEMENT: ICD-10-CM

## 2024-01-29 LAB — INR BLD: 3.1 (ref 0.9–1.1)

## 2024-01-29 PROCEDURE — 85610 PROTHROMBIN TIME: CPT

## 2024-01-29 PROCEDURE — 36416 COLLJ CAPILLARY BLOOD SPEC: CPT

## 2024-01-29 NOTE — PROGRESS NOTES
ANTICOAGULATION MANAGEMENT     Fausto Farr 83 year old male is on warfarin with therapeutic INR result. (Goal INR 2.5-3.5)    Recent labs: (last 7 days)     01/29/24  1007   INR 3.1*         ASSESSMENT     Source(s): Chart Review and Patient/Caregiver Call     Warfarin doses taken: Warfarin taken as instructed and booster dose 12 days ago  Diet: No new diet changes identified  Medication/supplement changes: None noted  New illness, injury, or hospitalization: No  Signs or symptoms of bleeding or clotting: No  Previous result: Subtherapeutic  Additional findings: Upcoming surgery/procedure vein ablation and Warfarin maintenance dose was increased 11% at last visit  Vein procedure scheduled for 2/13/24  Patient states he is not certain if he has to hold warfarin, I informed him [facility].       PLAN     Recommended plan for no diet, medication or health factor changes affecting INR     Dosing Instructions: Continue your current warfarin dose with next INR in 3 weeks       Summary  As of 1/29/2024      Full warfarin instructions:  5 mg every Mon, Fri; 7.5 mg all other days   Next INR check:  2/20/2024               Telephone call with Ralph who verbalizes understanding and agrees to plan and who agrees to plan and repeated back plan correctly    Lab visit not yet scheduled     Education provided:   Taking warfarin: importance of following [facility] instructions vs instructions on the prescription bottle and Importance of taking warfarin as instructed    Plan made per [facility] anticoagulation protocol    Aure Sampson RN  Anticoagulation Clinic  1/29/2024    _______________________________________________________________________     Anticoagulation Episode Summary       Current INR goal:  2.5-3.5   TTR:  57.1% (1 y)   Target end date:  Indefinite   Send INR reminders to:  SHANNA DOWELL    Indications    S/P aortic valve replacement [Z95.2]  S/P mitral valve replacement [Z95.2]  Long term current use of  anticoagulant therapy [Z79.01]  Longstanding persistent atrial fibrillation (H) [I48.11]             Comments:  Per 9/20/22 Cardio Note strict INR goal of 2.5-3.5. If INR falls below 2.5 at any time, needs to be bridged with Lovenox. consent to leave DVM for medical information and scheduling             Anticoagulation Care Providers       Provider Role Specialty Phone number    Jodi Flower Mai, MD Referring Internal Medicine - Pediatrics [phone]

## 2024-01-29 NOTE — TELEPHONE ENCOUNTER
Ralph is scheduled for vein ablation-left leg on 2/13/24     Patient is currently on Warfarin for AVR and MVR     Procedure is scheduled with Dr. Wu's group at unknown    Thank you,  Aure Sampson RN

## 2024-01-30 ENCOUNTER — DOCUMENTATION ONLY (OUTPATIENT)
Dept: ANTICOAGULATION | Facility: CLINIC | Age: 84
End: 2024-01-30
Payer: MEDICARE

## 2024-01-30 DIAGNOSIS — Z95.2 S/P AORTIC VALVE REPLACEMENT: ICD-10-CM

## 2024-01-30 DIAGNOSIS — Z95.2 S/P MITRAL VALVE REPLACEMENT: Primary | ICD-10-CM

## 2024-01-30 NOTE — PROGRESS NOTES
ANTICOAGULATION CLINIC REFERRAL RENEWAL REQUEST       An annual renewal order is required for all patients referred to [facility].?  Please review and sign the pended referral order for Fausto Alli Farr.       ANTICOAGULATION SUMMARY      Warfarin indication(s)   Mechanical AVR and Mechanical MVR    Mechanical heart valve present?  Mechanical  AVR-Bileaflet and Mechanical MVR       Current goal range   INR: 2.5-3.5     Goal appropriate for indication? Goal INR 2.5-3.5 standard for indication(s) above     Time in Therapeutic Range (TTR)  (Goal > 60%) 57%       Office visit with referring provider's group within last year yes on 11/21/2023       Aure Sampson RN  [facility]

## 2024-01-30 NOTE — PROGRESS NOTES
Patient's chart was reviewed in preparation of ablation of the left great saphenous vein and stab phlebectomies of the large ropey varicosities in the thigh on day of surgery. Patient has history of both AVR and MVR. Will reach out to clarify INR goal.    Tu Prescott, [facility]

## 2024-02-05 NOTE — TELEPHONE ENCOUNTER
"SYLVIA-PROCEDURAL ANTICOAGULATION  MANAGEMENT    ASSESSMENT     Warfarin interruption plan for ablation of the left great saphenous vein and stab phlebectomies on 24.    Indication for Anticoagulation: Atrial Fibrillation, Mechanical AVR, and Mechanical MVR    Diabetes (Type 2)  Hypertension    Sylvia-Procedure Risk stratification for thromboembolism: high ( Chest guidelines)    HIGH RISK:  CHEST Perioperative Management guidelines suggests bridging patients at high risk for thromboembolism when interrupting warfarin for an elective surgery/procedure    RECOMMENDATION     Pre-Procedure:  Hold warfarin for 5 days, until after procedure startin2024   Enoxaparin (Lovenox) 100 mg subq Q 12 hrs (1 mg/kg Q 12 hrs for CrCl >= 60 ml/min and BMI <= 40 kg/m2)   Start enoxaparin: 2/10/2024 AM  Last dose of enoxaparin prior to procedure: 24 AM (~24 hours prior to procedure)    Post-Procedure:  Resume warfarin dose if okay with provider doing procedure on night of procedure, 24 PM: Take 15mg on evening 24 and 24; take regular maintenance dose on 2/15/24 (5 mg every Mon, Fri; 7.5 mg all other days)  Resume enoxaparin (Lovenox) ~ 24 hrs post procedure when okay with provider doing procedure. Continue until INR >= 2.5  Recheck INR 5-7 days after resuming warfarin   ?     Plan routed to referring provider for approval  ?   Tu Prescott, [facility]    SUBJECTIVE/OBJECTIVE     Fausto Farr, a 83 year old male    Goal INR Range: 2.5-3.5     Patient bridged in past: Yes: 22      Wt Readings from Last 3 Encounters:   23 98.6 kg (217 lb 4.8 oz)   10/24/23 98.6 kg (217 lb 6.4 oz)   23 97.3 kg (214 lb 6.4 oz)      Ideal body weight: 63.1 kg (139 lb 2.8 oz)  Adjusted ideal body weight: 77.3 kg (170 lb 6.8 oz)     Estimated body mass index is 35.38 kg/m  as calculated from the following:    Height as of 10/24/23: 1.669 m (5' 5.71\").    Weight as of 23: 98.6 kg (217 lb 4.8 oz).    Lab " Results   Component Value Date    INR 3.1 (H) 01/29/2024    INR 2.0 (H) 01/17/2024    INR 3.1 (H) 01/03/2024     Lab Results   Component Value Date    HGB 13.2 (L) 01/17/2023    HCT 41.6 01/17/2023     01/17/2023     Lab Results   Component Value Date    CR 0.94 08/01/2023    CR 0.72 01/10/2023    CR 0.69 01/09/2023     Estimated Creatinine Clearance: 66.2 mL/min (based on SCr of 0.94 mg/dL).

## 2024-02-06 ENCOUNTER — TELEPHONE (OUTPATIENT)
Dept: VASCULAR SURGERY | Facility: CLINIC | Age: 84
End: 2024-02-06
Payer: MEDICARE

## 2024-02-06 DIAGNOSIS — I83.812 VARICOSE VEINS OF LEFT LOWER EXTREMITY WITH PAIN: Primary | ICD-10-CM

## 2024-02-06 DIAGNOSIS — I83.892 BLEEDING FROM VARICOSE VEINS OF LEFT LOWER EXTREMITY: ICD-10-CM

## 2024-02-06 RX ORDER — ENOXAPARIN SODIUM 100 MG/ML
100 INJECTION SUBCUTANEOUS EVERY 12 HOURS
Qty: 28 ML | Refills: 1 | Status: SHIPPED | OUTPATIENT
Start: 2024-02-06 | End: 2024-04-17

## 2024-02-06 NOTE — TELEPHONE ENCOUNTER
Called patient and spouse, discussed the below warfarin hold plan with Lovenox bridging. Spouse read back the plan correctly. Patient agrees with the plan. Rx for Lovenox sent to patient's preferred pharmacy. Patient will call INR Clinic with any changes, questions or concerns.  Cece Rios RN, BSN  Anticoagulation Clinic

## 2024-02-06 NOTE — TELEPHONE ENCOUNTER
2/6/2024    Vein Clinic Preoperative Nurse Call    Procedure: Leg leg VNUS Closure GSV (med nec) 1 unit phleb (med nec)  Date: Tuesday 2/13  Surgeon: Dr. Wu  Time: 1100  Check in time: 1000    Called and spoke to patient and patient's wife Ritu. Informed patient: when to check in (1000) to sign consent, to bring their preop medications in their original bottle with them (1mg ativan, 0.1mg clonidine, 2g amoxicillin). Patient will take the medications after signing the consent to the procedure. Instructed patient to wear loose-fitting comfortable clothing, and bring their compression hose. Ensured patient has a /someone that will be responsible for them the rest of the day. Once procedure is completed, we will keep patient in recovery for 30-45 mins, and call  with aftercare instructions. Informed patient, that if possible, they should sit in the backseat to elevate their leg on the ride home.    Pt needs Thigh High compression hose for procedure. Status of the hose: patient has thigh high hose.    Special instructions: Pt will be instructed by his INR clinic on holding his warfarin and bridging with Lovenox (pt has hx of MVR, AVR) prior to his procedure. Informed pt and his wife someone will most likely be reaching out to them soon regarding the Lovenox prescription and instructions.    Patient understands if they have any of the following symptoms (fever, cough, shortness of breath, rash), they need to notify us immediately as they may need to cancel their procedure and reschedule for a later date.    Patient and patient's wife are in agreement with all of the above and has no further questions at this time.    Jesi Sweet RN  [facility]

## 2024-02-06 NOTE — TELEPHONE ENCOUNTER
Calderon Santo MD  You; Tu Prescott, RPH16 hours ago (5:17 PM)     SH  Sounds good,  with 2 mech valves we dont want to be subRx for too long

## 2024-02-07 RX ORDER — CLONIDINE HYDROCHLORIDE 0.1 MG/1
TABLET ORAL
Qty: 1 TABLET | Refills: 0 | Status: SHIPPED | OUTPATIENT
Start: 2024-02-07 | End: 2024-02-15

## 2024-02-07 RX ORDER — AMOXICILLIN 500 MG/1
CAPSULE ORAL
Qty: 4 CAPSULE | Refills: 0 | Status: SHIPPED | OUTPATIENT
Start: 2024-02-07 | End: 2024-02-15

## 2024-02-07 RX ORDER — LORAZEPAM 1 MG/1
TABLET ORAL
Qty: 1 TABLET | Refills: 0 | Status: SHIPPED | OUTPATIENT
Start: 2024-02-07 | End: 2024-02-15

## 2024-02-13 ENCOUNTER — OFFICE VISIT (OUTPATIENT)
Dept: VASCULAR SURGERY | Facility: CLINIC | Age: 84
End: 2024-02-13
Payer: MEDICARE

## 2024-02-13 VITALS — SYSTOLIC BLOOD PRESSURE: 103 MMHG | HEART RATE: 63 BPM | DIASTOLIC BLOOD PRESSURE: 67 MMHG | OXYGEN SATURATION: 92 %

## 2024-02-13 DIAGNOSIS — I83.812 VARICOSE VEINS OF LEFT LOWER EXTREMITY WITH PAIN: Primary | ICD-10-CM

## 2024-02-13 PROCEDURE — 37766 PHLEB VEINS - EXTREM 20+: CPT | Mod: LT | Performed by: SURGERY

## 2024-02-13 PROCEDURE — 36475 ENDOVENOUS RF 1ST VEIN: CPT | Mod: LT | Performed by: SURGERY

## 2024-02-13 RX ORDER — HYDROCODONE BITARTRATE AND ACETAMINOPHEN 5; 325 MG/1; MG/1
1 TABLET ORAL EVERY 6 HOURS PRN
Qty: 15 TABLET | Refills: 0 | Status: SHIPPED | OUTPATIENT
Start: 2024-02-13 | End: 2024-05-20

## 2024-02-13 NOTE — PROGRESS NOTES
Pre-procedure Nursing Note    Fausto Farr presents to clinic for Vein Procedure  .   /Person Responsible for Patient: Ralph (Spouse)  Phone Number: [phone]    Prophylactic Medication:Antibiotics, Amoxicillin 2g,   Time Taken: 1002   Sedation Medication: Ativan, 1mg ,   Time Taken: 1002 and Clonidine, 0.1mg,   Time Taken: 1002  Compression Stockings: Patient brought with today.  The procedure is being performed on LLE.  Patient understanding of procedure matches consent? YES    Patient's pre-procedure medications verified by JUSTIN Dennison.    Jesi Sweet RN on 2/13/2024 at 10:07 AM

## 2024-02-13 NOTE — LETTER
2/13/2024         RE: Fausto Farr  [address]  [place] [address]        Dear Colleague,    Thank you for referring your patient, Fausto Farr, to the [facility] [place]. Please see a copy of my visit note below.    Pre-procedure Nursing Note    Fausto Farr presents to clinic for Vein Procedure  .   /Person Responsible for Patient: Ralph (Spouse)  Phone Number: [phone]    Prophylactic Medication:Antibiotics, Amoxicillin 2g,   Time Taken: 1002   Sedation Medication: Ativan, 1mg ,   Time Taken: 1002 and Clonidine, 0.1mg,   Time Taken: 1002  Compression Stockings: Patient brought with today.  The procedure is being performed on E.  Patient understanding of procedure matches consent? YES    Patient's pre-procedure medications verified by JUSTIN Dennison.    Jesi Sweet RN on 2/13/2024 at 10:07 AM        Vein Clinic Procedure Note    Preoperative diagnosis:    1.  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  2.  Left great saphenous vein incompetence.    Post operative diagnosis:  Same    Procedure:  1.  Left great saphenous vein radiofrequency ablation.  2.  Left lower extremity medically necessary stab phlebectomies, 37 in number.    Preoperative medications: 3 mg ativan, 0.1 mg clonidine      Procedures    Operative description  Indications: This is an 83-year-old gentleman with symptomatic left lower extremity varicose veins.  He has had a previous closure procedure at an outside facility with what it sounds like was cyanoacrylate glue.  He has a recanalized left great saphenous vein giving rise to multiple varicosities in the left lower extremity.  He has failed conservative management.  We plan to ablate the left great saphenous vein and stab phlebectomies.    Procedure: Patient was identified and then taken to the procedure room.  He was placed in supine position.  Left lower extremity was prepped.  Sterile surgical  field was created.  Preprocedure timeout was conducted.  I mapped out the left great saphenous vein from the mid thigh to the groin.  Below that there was appearance of partial occlusion and chronic thrombus.  Local anesthetic was administered in the distal thigh and the vein was accessed with a micropuncture needle followed by placement of a microwire.  Then we placed a 7 German sheath.  Radiofrequency ablation catheter was advanced to the saphenofemoral junction and pulled back 2.3 cm.  Generous tumescent anesthetic was administered into the saphenous compartment.  Standard radiofrequency ablation was performed.  Previously marked varicose veins were infiltrated with generous tumescent anesthetic solution.  They were treated with standard phlebectomies.    Adequate hemostasis was noted.    VNUS: Left great saphenous vein.    Stab phlebectomy: 37, medically necessary.    EBL: 20 mL        2024    10:52 AM   Flowsheet Data   Procedure Start Time: 10:52   Prep: Chloraprep   Side: Left   Tx Length (cm): LEFT GSV: 24   Junction (cm): LEFT GSV: 2.31   RF Cycles: LEFT GSV: 24   RF TX Time (Minutes): LEFT GSV: 2:31   # PHLEB Sites: LEFT LE   Sedation taken: Yes   Pre Pt. Physical / Cognitive Limitations: WNL   TOTAL Local anesthesia Injected (ml): 3.5   Max Volume Local Anesthesia (ml): 11   TOTAL Tumescent Injected volume (ml): 500   Max Volume Tumescent (ml): 572   Post Pt. Physical / Cognitive Limitations: WNL   Procedure End Time: 11:45   D/C Instructions given, states readiness to leave and escorted to car: Yes       Patient's blood pressure, pulse and pulse oximetry were continuously monitored throughout the procedure under my direct supervision and was stable during the procedure.    Patient recovered in our suites and discharged home with their family with postoperative instructions and follow up.     Vini Wu MD      Again, thank you for allowing me to participate in the care of your patient.         Sincerely,        Vini Wu MD

## 2024-02-13 NOTE — PROGRESS NOTES
Vein Clinic Procedure Note    Preoperative diagnosis:    1.  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  2.  Left great saphenous vein incompetence.    Post operative diagnosis:  Same    Procedure:  1.  Left great saphenous vein radiofrequency ablation.  2.  Left lower extremity medically necessary stab phlebectomies, 37 in number.    Preoperative medications: 3 mg ativan, 0.1 mg clonidine      Procedures    Operative description  Indications: This is an 83-year-old gentleman with symptomatic left lower extremity varicose veins.  He has had a previous closure procedure at an outside facility with what it sounds like was cyanoacrylate glue.  He has a recanalized left great saphenous vein giving rise to multiple varicosities in the left lower extremity.  He has failed conservative management.  We plan to ablate the left great saphenous vein and stab phlebectomies.    Procedure: Patient was identified and then taken to the procedure room.  He was placed in supine position.  Left lower extremity was prepped.  Sterile surgical field was created.  Preprocedure timeout was conducted.  I mapped out the left great saphenous vein from the mid thigh to the groin.  Below that there was appearance of partial occlusion and chronic thrombus.  Local anesthetic was administered in the distal thigh and the vein was accessed with a micropuncture needle followed by placement of a microwire.  Then we placed a 7 Lithuanian sheath.  Radiofrequency ablation catheter was advanced to the saphenofemoral junction and pulled back 2.3 cm.  Generous tumescent anesthetic was administered into the saphenous compartment.  Standard radiofrequency ablation was performed.  Previously marked varicose veins were infiltrated with generous tumescent anesthetic solution.  They were treated with standard phlebectomies.    Adequate hemostasis was noted.    VNUS: Left great saphenous vein.    Stab  phlebectomy: 37, medically necessary.    EBL: 20 mL        2024    10:52 AM   Flowsheet Data   Procedure Start Time: 10:52   Prep: Chloraprep   Side: Left   Tx Length (cm): LEFT GSV: 24   Junction (cm): LEFT GSV: 2.31   RF Cycles: LEFT GSV: 24   RF TX Time (Minutes): LEFT GSV: 2:31   # PHLEB Sites: LEFT LE   Sedation taken: Yes   Pre Pt. Physical / Cognitive Limitations: WNL   TOTAL Local anesthesia Injected (ml): 3.5   Max Volume Local Anesthesia (ml): 11   TOTAL Tumescent Injected volume (ml): 500   Max Volume Tumescent (ml): 572   Post Pt. Physical / Cognitive Limitations: WNL   Procedure End Time: 11:45   D/C Instructions given, states readiness to leave and escorted to car: Yes       Patient's blood pressure, pulse and pulse oximetry were continuously monitored throughout the procedure under my direct supervision and was stable during the procedure.    Patient recovered in our suites and discharged home with their family with postoperative instructions and follow up.     Vini Wu MD

## 2024-02-15 ENCOUNTER — ANCILLARY PROCEDURE (OUTPATIENT)
Dept: ULTRASOUND IMAGING | Facility: CLINIC | Age: 84
End: 2024-02-15
Attending: SURGERY
Payer: MEDICARE

## 2024-02-15 ENCOUNTER — ALLIED HEALTH/NURSE VISIT (OUTPATIENT)
Dept: VASCULAR SURGERY | Facility: CLINIC | Age: 84
End: 2024-02-15
Attending: SURGERY
Payer: MEDICARE

## 2024-02-15 DIAGNOSIS — I83.892 BLEEDING FROM VARICOSE VEINS OF LEFT LOWER EXTREMITY: ICD-10-CM

## 2024-02-15 DIAGNOSIS — Z09 POSTOP CHECK: Primary | ICD-10-CM

## 2024-02-15 PROCEDURE — 93971 EXTREMITY STUDY: CPT | Mod: LT | Performed by: SURGERY

## 2024-02-15 PROCEDURE — 99207 PR NO CHARGE NURSE ONLY: CPT

## 2024-02-15 NOTE — PROGRESS NOTES
February 15, 2024    Vein Clinic Postoperative Nurse Note    Patient is here for his 48 hour postoperative visit.    Procedure: Left leg VNUS Closure GSV (med nec)1 unit phleb (med nec)  Procedure Date: 2/13/24  Surgeon: Dr. Wu    Ultrasound Result: The GSV is closed 26.3mm from SFJ to mid calf. No evidence of LLE DVT.    Physical Exam: Incisions are approximated without signs of infection.  Ecchymosis: moderate (37 phlebs)  Swelling: minimal  Paresthesia: pt denies numbness    Patient Questions or Concerns: Overall, pt is doing well.    Assisted pt with donning his compression hose, pt had the white Harvey hose.    Reviewed postoperative instructions with patient and provided him with written material of common things to expect from his procedure.    Patient's Next Vein Clinic Appointment: 6 week post op with Dr. Wu (3/28/24).    Jesi Sweet RN  [facility]  
Yes

## 2024-02-16 ENCOUNTER — TELEPHONE (OUTPATIENT)
Dept: PEDIATRICS | Facility: CLINIC | Age: 84
End: 2024-02-16

## 2024-02-16 ENCOUNTER — LAB (OUTPATIENT)
Dept: LAB | Facility: CLINIC | Age: 84
End: 2024-02-16
Payer: MEDICARE

## 2024-02-16 ENCOUNTER — ANTICOAGULATION THERAPY VISIT (OUTPATIENT)
Dept: ANTICOAGULATION | Facility: CLINIC | Age: 84
End: 2024-02-16

## 2024-02-16 DIAGNOSIS — Z95.2 S/P MITRAL VALVE REPLACEMENT: ICD-10-CM

## 2024-02-16 DIAGNOSIS — Z79.01 LONG TERM CURRENT USE OF ANTICOAGULANT THERAPY: ICD-10-CM

## 2024-02-16 DIAGNOSIS — E11.9 TYPE 2 DIABETES MELLITUS WITHOUT COMPLICATION, WITHOUT LONG-TERM CURRENT USE OF INSULIN (H): ICD-10-CM

## 2024-02-16 DIAGNOSIS — I48.11 LONGSTANDING PERSISTENT ATRIAL FIBRILLATION (H): ICD-10-CM

## 2024-02-16 DIAGNOSIS — Z95.2 S/P AORTIC VALVE REPLACEMENT: ICD-10-CM

## 2024-02-16 DIAGNOSIS — Z95.2 S/P AORTIC VALVE REPLACEMENT: Primary | ICD-10-CM

## 2024-02-16 DIAGNOSIS — I50.22 CHRONIC SYSTOLIC CONGESTIVE HEART FAILURE (H): ICD-10-CM

## 2024-02-16 DIAGNOSIS — E11.9 DIABETES MELLITUS, TYPE 2 (H): ICD-10-CM

## 2024-02-16 DIAGNOSIS — I10 ESSENTIAL HYPERTENSION, BENIGN: ICD-10-CM

## 2024-02-16 LAB
ANION GAP SERPL CALCULATED.3IONS-SCNC: 9 MMOL/L (ref 7–15)
BUN SERPL-MCNC: 19.6 MG/DL (ref 8–23)
CALCIUM SERPL-MCNC: 9.3 MG/DL (ref 8.8–10.2)
CHLORIDE SERPL-SCNC: 103 MMOL/L (ref 98–107)
CREAT SERPL-MCNC: 1.01 MG/DL (ref 0.67–1.17)
CREAT UR-MCNC: 158 MG/DL
DEPRECATED HCO3 PLAS-SCNC: 26 MMOL/L (ref 22–29)
EGFRCR SERPLBLD CKD-EPI 2021: 74 ML/MIN/1.73M2
ERYTHROCYTE [DISTWIDTH] IN BLOOD BY AUTOMATED COUNT: 12.5 % (ref 10–15)
GLUCOSE SERPL-MCNC: 177 MG/DL (ref 70–99)
HBA1C MFR BLD: 6.9 % (ref 0–5.6)
HCT VFR BLD AUTO: 41 % (ref 40–53)
HGB BLD-MCNC: 12.8 G/DL (ref 13.3–17.7)
INR BLD: 2.4 (ref 0.9–1.1)
MCH RBC QN AUTO: 28.6 PG (ref 26.5–33)
MCHC RBC AUTO-ENTMCNC: 31.2 G/DL (ref 31.5–36.5)
MCV RBC AUTO: 92 FL (ref 78–100)
MICROALBUMIN UR-MCNC: 36.5 MG/L
MICROALBUMIN/CREAT UR: 23.1 MG/G CR (ref 0–17)
PLATELET # BLD AUTO: 277 10E3/UL (ref 150–450)
POTASSIUM SERPL-SCNC: 4.2 MMOL/L (ref 3.4–5.3)
RBC # BLD AUTO: 4.48 10E6/UL (ref 4.4–5.9)
SODIUM SERPL-SCNC: 138 MMOL/L (ref 135–145)
WBC # BLD AUTO: 7.2 10E3/UL (ref 4–11)

## 2024-02-16 PROCEDURE — 80048 BASIC METABOLIC PNL TOTAL CA: CPT

## 2024-02-16 PROCEDURE — 85027 COMPLETE CBC AUTOMATED: CPT

## 2024-02-16 PROCEDURE — 36415 COLL VENOUS BLD VENIPUNCTURE: CPT

## 2024-02-16 PROCEDURE — 83036 HEMOGLOBIN GLYCOSYLATED A1C: CPT

## 2024-02-16 PROCEDURE — 82043 UR ALBUMIN QUANTITATIVE: CPT

## 2024-02-16 PROCEDURE — 85610 PROTHROMBIN TIME: CPT

## 2024-02-16 PROCEDURE — 82570 ASSAY OF URINE CREATININE: CPT

## 2024-02-16 NOTE — TELEPHONE ENCOUNTER
General Call      Reason for Call:  INR    What are your questions or concerns:  follow up - pt concerned about reading    Date of last appointment with provider: 02-16-24    Could we send this information to you in [employer] or would you prefer to receive a phone call?:   Patient would prefer a phone call   Okay to leave a detailed message?: Yes at Cell number on file:    Telephone Information:   Mobile [phone]

## 2024-02-16 NOTE — TELEPHONE ENCOUNTER
Call returned to patient, see 2/16/2024 Anticoagulation encounter for warfarin dosing instructions.  eCce Rios RN, BSN  Anticoagulation Clinic

## 2024-02-16 NOTE — PROGRESS NOTES
ANTICOAGULATION MANAGEMENT     Fausto Farr 83 year old male is on warfarin with subtherapeutic INR result. (Goal INR 2.5-3.5)    Recent labs: (last 7 days)     02/16/24  0907   INR 2.4*       ASSESSMENT     Source(s): Chart Review and Patient/Caregiver Call     Warfarin doses taken: Booster dose(s) recently taken on 2/13/24 and 2/14/24 which may be affecting INR and Held for ablation of the left great saphenous vein and stab phlebectomies on 2/13/24.  recently which may be affecting INR. Warfarin resumed 2/13/24, warfarin was held 2/8/24 - 2/12/24 for procedure  Diet: No new diet changes identified  Medication/supplement changes: None noted  New illness, injury, or hospitalization: Yes: ablation of the left great saphenous vein and stab phlebectomies on 2/13/24.   Signs or symptoms of bleeding or clotting: Yes: bruising from procedure  Previous result: Therapeutic last visit; previously outside of goal range  Additional findings: Patient reports he did not  all the Lovenox syringes, he states he has 2 doses left, and thinks that is enough with his INR is at 2.4       PLAN     Recommended plan for temporary change(s) affecting INR     Dosing Instructions: Continue your current warfarin dose Continue bridging with Enoxaparin with next INR in 4 days       Summary  As of 2/16/2024      Full warfarin instructions:  5 mg every Mon, Fri; 7.5 mg all other days   Next INR check:  2/20/2024               Telephone call with Ralph who verbalizes understanding and agrees to plan    Lab visit scheduled    Education provided:   Please call back if any changes to your diet, medications or how you've been taking warfarin  Contact [phone]  with any changes, questions or concerns.     Plan made per [facility] anticoagulation protocol    Cece Rios RN  Anticoagulation Clinic  2/16/2024    _______________________________________________________________________     Anticoagulation Episode Summary       Current INR goal:   2.5-3.5   TTR:  59.2% (1 y)   Target end date:  Indefinite   Send INR reminders to:  SHANNA DELMER    Indications    S/P aortic valve replacement [Z95.2]  S/P mitral valve replacement [Z95.2]  Long term current use of anticoagulant therapy [Z79.01]  Longstanding persistent atrial fibrillation (H) [I48.11]             Comments:  Per 9/20/22 Cardio Note strict INR goal of 2.5-3.5. If INR falls below 2.5 at any time, needs to be bridged with Lovenox. consent to leave DVM for medical information and scheduling             Anticoagulation Care Providers       Provider Role Specialty Phone number    Jodi Flower Mai, MD Referring Internal Medicine - Pediatrics [phone]

## 2024-02-20 ENCOUNTER — LAB (OUTPATIENT)
Dept: LAB | Facility: CLINIC | Age: 84
End: 2024-02-20
Payer: MEDICARE

## 2024-02-20 ENCOUNTER — ANTICOAGULATION THERAPY VISIT (OUTPATIENT)
Dept: ANTICOAGULATION | Facility: CLINIC | Age: 84
End: 2024-02-20

## 2024-02-20 DIAGNOSIS — Z95.2 S/P MITRAL VALVE REPLACEMENT: ICD-10-CM

## 2024-02-20 DIAGNOSIS — I48.11 LONGSTANDING PERSISTENT ATRIAL FIBRILLATION (H): ICD-10-CM

## 2024-02-20 DIAGNOSIS — Z95.2 S/P AORTIC VALVE REPLACEMENT: Primary | ICD-10-CM

## 2024-02-20 DIAGNOSIS — Z95.2 S/P AORTIC VALVE REPLACEMENT: ICD-10-CM

## 2024-02-20 DIAGNOSIS — Z79.01 LONG TERM CURRENT USE OF ANTICOAGULANT THERAPY: ICD-10-CM

## 2024-02-20 LAB — INR BLD: 2.8 (ref 0.9–1.1)

## 2024-02-20 PROCEDURE — 36416 COLLJ CAPILLARY BLOOD SPEC: CPT

## 2024-02-20 PROCEDURE — 85610 PROTHROMBIN TIME: CPT

## 2024-02-20 NOTE — TELEPHONE ENCOUNTER
Problem: Toileting  Goal: STG-Within one week, patient will complete toileting tasks at SBA level.  Outcome: Not Met     Problem: Bathing  Goal: STG-Within one week, patient will bathe at SPV level.  Outcome: Met     Problem: Dressing  Goal: STG-Within one week, patient will dress LB at SBA level using DME PRN.  Outcome: Met     Problem: Functional Transfers  Goal: STG-Within one week, patient will transfer to toilet at SBA level.  Outcome: Met  Goal: STG-Within one week, patient will transfer to step in shower at SBA level.  Outcome: Met      Called patient: gave him the message below.  He would like a physical that day and will also come in fasting for labs.    I attempted to change the type of appointment to a Wellness Visit, but got an error stating that provider is on vacation-routing to TC to assist and change.  Thanks!  Claudia Day RN

## 2024-02-20 NOTE — PROGRESS NOTES
ANTICOAGULATION MANAGEMENT     Fausto Farr 83 year old male is on warfarin with therapeutic INR result. (Goal INR 2.5-3.5)    Recent labs: (last 7 days)     02/20/24  0856   INR 2.8*       ASSESSMENT     Source(s): Chart Review and Patient/Caregiver Call     Warfarin doses taken: Warfarin taken as instructed  Diet: No new diet changes identified  Medication/supplement changes: None noted  New illness, injury, or hospitalization: No  Signs or symptoms of bleeding or clotting: No  Previous result: Subtherapeutic  Additional findings:  Stopped enoxaparin after last INR check on how own. Had 2 doses left and completed those and is no longer bridging.        PLAN     Recommended plan for no diet, medication or health factor changes affecting INR     Dosing Instructions: Continue your current warfarin dose with next INR in 1 week       Summary  As of 2/20/2024      Full warfarin instructions:  5 mg every Mon, Fri; 7.5 mg all other days   Next INR check:  2/27/2024               Telephone call with Ralph who verbalizes understanding and agrees to plan    Lab visit scheduled    Education provided:   Please call back if any changes to your diet, medications or how you've been taking warfarin    Plan made per [facility] anticoagulation protocol    Jame Chadwick RN  Anticoagulation Clinic  2/20/2024    _______________________________________________________________________     Anticoagulation Episode Summary       Current INR goal:  2.5-3.5   TTR:  58.9% (1 y)   Target end date:  Indefinite   Send INR reminders to:  SHANNA DOWELL    Indications    S/P aortic valve replacement [Z95.2]  S/P mitral valve replacement [Z95.2]  Long term current use of anticoagulant therapy [Z79.01]  Longstanding persistent atrial fibrillation (H) [I48.11]             Comments:  Per 9/20/22 Cardio Note strict INR goal of 2.5-3.5. If INR falls below 2.5 at any time, needs to be bridged with Lovenox. consent to leave DVM for medical  information and scheduling             Anticoagulation Care Providers       Provider Role Specialty Phone number    Jodi Flower Mai, MD Referring Internal Medicine - Pediatrics [phone]

## 2024-02-27 ENCOUNTER — LAB (OUTPATIENT)
Dept: LAB | Facility: CLINIC | Age: 84
End: 2024-02-27
Payer: MEDICARE

## 2024-02-27 ENCOUNTER — TELEPHONE (OUTPATIENT)
Dept: PEDIATRICS | Facility: CLINIC | Age: 84
End: 2024-02-27

## 2024-02-27 ENCOUNTER — ANTICOAGULATION THERAPY VISIT (OUTPATIENT)
Dept: ANTICOAGULATION | Facility: CLINIC | Age: 84
End: 2024-02-27

## 2024-02-27 DIAGNOSIS — Z95.2 S/P AORTIC VALVE REPLACEMENT: ICD-10-CM

## 2024-02-27 DIAGNOSIS — Z79.01 LONG TERM CURRENT USE OF ANTICOAGULANT THERAPY: ICD-10-CM

## 2024-02-27 DIAGNOSIS — I48.11 LONGSTANDING PERSISTENT ATRIAL FIBRILLATION (H): ICD-10-CM

## 2024-02-27 DIAGNOSIS — Z95.2 S/P MITRAL VALVE REPLACEMENT: ICD-10-CM

## 2024-02-27 DIAGNOSIS — Z95.2 S/P AORTIC VALVE REPLACEMENT: Primary | ICD-10-CM

## 2024-02-27 LAB — INR BLD: 3.5 (ref 0.9–1.1)

## 2024-02-27 PROCEDURE — 36416 COLLJ CAPILLARY BLOOD SPEC: CPT

## 2024-02-27 PROCEDURE — 85610 PROTHROMBIN TIME: CPT

## 2024-02-27 NOTE — PROGRESS NOTES
ANTICOAGULATION MANAGEMENT     Fausto Farr 83 year old male is on warfarin with therapeutic INR result. (Goal INR 2.5-3.5)    Recent labs: (last 7 days)     02/27/24  0951   INR 3.5*       ASSESSMENT     Source(s): Chart Review and Patient/Caregiver Call     Warfarin doses taken: Warfarin taken as instructed  Diet: No new diet changes identified  Medication/supplement changes: patient reports taking oxycodone occasionally and 2-4 tablets of tylenol as needed.  New illness, injury, or hospitalization: No; however, patient reports on-going leg pain, has been going to physical therapy for it but unresolved thus far.  Signs or symptoms of bleeding or clotting: No  Previous result: Therapeutic last 2(+) visits  Additional findings: INR trending up, patient requested a maintenance dose reduction, [facility] Irma agreed.       PLAN     Recommended plan for ongoing change(s) affecting INR     Dosing Instructions: decrease your warfarin dose (5% change) with next INR in 8 days       Summary  As of 2/27/2024      Full warfarin instructions:  5 mg every Mon, Wed, Fri; 7.5 mg all other days   Next INR check:  3/6/2024               Telephone call with Ralph who verbalizes understanding and agrees to plan and who agrees to plan and repeated back plan correctly    Lab visit scheduled    Education provided:   How pain and inflammation can affect INR    Plan made with [facility] Pharmacist Irma Sampson, RN  Anticoagulation Clinic  2/27/2024    _______________________________________________________________________     Anticoagulation Episode Summary       Current INR goal:  2.5-3.5   TTR:  58.9% (1 y)   Target end date:  Indefinite   Send INR reminders to:  SHANNA DOWELL    Indications    S/P aortic valve replacement [Z95.2]  S/P mitral valve replacement [Z95.2]  Long term current use of anticoagulant therapy [Z79.01]  Longstanding persistent atrial fibrillation (H) [I48.11]             Comments:  Per 9/20/22 Cardio Note  strict INR goal of 2.5-3.5. If INR falls below 2.5 at any time, needs to be bridged with Lovenox. consent to leave DVM for medical information and scheduling             Anticoagulation Care Providers       Provider Role Specialty Phone number    Jodi Flower Mai, MD Referring Internal Medicine - Pediatrics [phone]

## 2024-02-27 NOTE — TELEPHONE ENCOUNTER
Patient Returning Call    Reason for call:  anticoagulation     Information relayed to patient:  Message sent to return call to INR nurse     Patient has additional questions:  No      Could we [facility] this information to you [facility] or would you prefer to receive a phone call?:   Patient would prefer a phone call   Okay to leave a detailed message?: Yes at Home number on file [phone] (home)

## 2024-03-06 ENCOUNTER — LAB (OUTPATIENT)
Dept: LAB | Facility: CLINIC | Age: 84
End: 2024-03-06
Payer: MEDICARE

## 2024-03-06 ENCOUNTER — ANTICOAGULATION THERAPY VISIT (OUTPATIENT)
Dept: ANTICOAGULATION | Facility: CLINIC | Age: 84
End: 2024-03-06

## 2024-03-06 ENCOUNTER — TELEPHONE (OUTPATIENT)
Dept: PEDIATRICS | Facility: CLINIC | Age: 84
End: 2024-03-06

## 2024-03-06 ENCOUNTER — THERAPY VISIT (OUTPATIENT)
Dept: PHYSICAL THERAPY | Facility: CLINIC | Age: 84
End: 2024-03-06
Payer: MEDICARE

## 2024-03-06 DIAGNOSIS — M25.551 HIP PAIN, RIGHT: ICD-10-CM

## 2024-03-06 DIAGNOSIS — Z95.2 S/P AORTIC VALVE REPLACEMENT: ICD-10-CM

## 2024-03-06 DIAGNOSIS — I48.11 LONGSTANDING PERSISTENT ATRIAL FIBRILLATION (H): ICD-10-CM

## 2024-03-06 DIAGNOSIS — M79.651 PAIN OF RIGHT THIGH: Primary | ICD-10-CM

## 2024-03-06 DIAGNOSIS — Z95.2 S/P MITRAL VALVE REPLACEMENT: ICD-10-CM

## 2024-03-06 DIAGNOSIS — G89.29 CHRONIC RIGHT-SIDED LOW BACK PAIN WITH RIGHT-SIDED SCIATICA: ICD-10-CM

## 2024-03-06 DIAGNOSIS — Z79.01 LONG TERM CURRENT USE OF ANTICOAGULANT THERAPY: ICD-10-CM

## 2024-03-06 DIAGNOSIS — M54.41 CHRONIC RIGHT-SIDED LOW BACK PAIN WITH RIGHT-SIDED SCIATICA: ICD-10-CM

## 2024-03-06 DIAGNOSIS — Z95.2 S/P AORTIC VALVE REPLACEMENT: Primary | ICD-10-CM

## 2024-03-06 LAB — INR BLD: 4.3 (ref 0.9–1.1)

## 2024-03-06 PROCEDURE — 97110 THERAPEUTIC EXERCISES: CPT | Mod: GP | Performed by: PHYSICAL THERAPIST

## 2024-03-06 PROCEDURE — 85610 PROTHROMBIN TIME: CPT

## 2024-03-06 PROCEDURE — 97112 NEUROMUSCULAR REEDUCATION: CPT | Mod: GP | Performed by: PHYSICAL THERAPIST

## 2024-03-06 PROCEDURE — 36416 COLLJ CAPILLARY BLOOD SPEC: CPT

## 2024-03-06 NOTE — PROGRESS NOTES
"ANTICOAGULATION MANAGEMENT     Fausto Farr 83 year old male is on warfarin with supratherapeutic INR result. (Goal INR 2.5-3.5)    Recent labs: (last 7 days)     03/06/24  1216   INR 4.3*       ASSESSMENT     Source(s): Chart Review and Patient/Caregiver Call     Warfarin doses taken: Warfarin taken as instructed  Diet: No new diet changes identified--patient reports he primarily eats iceberg lettuce and \"once in a while\", eats broccoli and/or cabbage.  Medication/supplement changes: None noted  New illness, injury, or hospitalization: No  Signs or symptoms of bleeding or clotting: No  Previous result: Therapeutic last 2(+) visits  Additional findings: Warfarin maintenance dose was 7% at last visit, reduced more generously today.         PLAN     Recommended plan for no diet, medication or health factor changes affecting INR     Dosing Instructions: decrease your warfarin dose (11% change) with next INR in 10-14 days, patient elected for 14 days.       Summary  As of 3/6/2024      Full warfarin instructions:  7.5 mg every Tue, Sat; 5 mg all other days   Next INR check:  3/20/2024               Telephone call with Ralph who verbalizes understanding and agrees to plan and who agrees to plan and repeated back plan correctly    Lab visit scheduled    Education provided:   Dietary considerations: importance of consistent vitamin K intake, impact of vitamin K foods on INR, and vitamin K content of foods    Plan made per [facility] anticoagulation protocol    Aure Sampson RN  Anticoagulation Clinic  3/6/2024    _______________________________________________________________________     Anticoagulation Episode Summary       Current INR goal:  2.5-3.5   TTR:  56.7% (1 y)   Target end date:  Indefinite   Send INR reminders to:  SHANNA DOWELL    Indications    S/P aortic valve replacement [Z95.2]  S/P mitral valve replacement [Z95.2]  Long term current use of anticoagulant therapy [Z79.01]  Longstanding persistent atrial " fibrillation (H) [I48.11]             Comments:  Per 9/20/22 Cardio Note strict INR goal of 2.5-3.5. If INR falls below 2.5 at any time, needs to be bridged with Lovenox. consent to leave DVM for medical information and scheduling             Anticoagulation Care Providers       Provider Role Specialty Phone number    Jodi Flower Mai, MD Referring Internal Medicine - Pediatrics [phone]

## 2024-03-06 NOTE — TELEPHONE ENCOUNTER
General Call      Reason for Call:  CALL BACK FOR INR NURSE DANIA    What are your questions or concerns:  SEE ABOVE    Date of last appointment with provider: 3/6/24    Could we send this information to you in [facility] or would you prefer to receive a phone call?:   Patient would prefer a phone call   Okay to leave a detailed message?: Yes at Home number on file [phone] (home)

## 2024-03-06 NOTE — PROGRESS NOTES
ANTICOAGULATION MANAGEMENT     Fausto Farr 83 year old male is on warfarin with supratherapeutic INR result. (Goal INR 2.5-3.5)    Recent labs: (last 7 days)     03/06/24  1216   INR 4.3*       ASSESSMENT     Source(s): Chart Review  Previous INR was Therapeutic last 2(+) visits  Medication, diet, health changes since last INR chart reviewed; none identified  Warfarin maintenance dose was decreased 5% at last visit, plan is to decrease 11% today.         PLAN     Unable to reach Ralph today.    Left VM to call [phone] with transfer to [facility]      Follow up required to confirm warfarin dose taken and assess for changes and discuss out of range result     [facility] Sampson RN  Anticoagulation Clinic  3/6/2024

## 2024-03-06 NOTE — PROGRESS NOTES
03/06/24 0500   Appointment Info   Signing clinician's name / credentials Marcus Trujillo, DPT   Total/Authorized Visits 8 (E&T)   Visits Used 6   Medical Diagnosis Hip pain, right  Pain of right thigh   PT Tx Diagnosis Hip pain, right  Pain of right thigh    chronic R low back pain   Quick Adds Certification   Progress Note/Certification   Start of Care Date 12/04/23   Onset of illness/injury or Date of Surgery 11/21/23   Therapy Frequency 1x/wk   Predicted Duration 10 wks   Certification date from 01/30/24   Certification date to 04/09/24   Progress Note Due Date 01/29/24   Progress Note Completed Date 12/04/23   PT Goal 1   Goal Identifier walking   Goal Description Patient will be able to walk 15 min in order for household and community mobiilty.   Goal Progress Pt can walk 10 minutes   Target Date 04/09/24   Subjective Report   Subjective Report Pt indicates that he had some other medical issues that required his attention and needed to takea break from PT, pt is eager to get back to work on his low back and leg symptoms.  Pt feels that he has made some progress, but very slow.   Objective Measures   Objective Measures Objective Measure 1;Objective Measure 2;Objective Measure 3   Objective Measure 1   Objective Measure AROM Lumbar   Details Flx: toes, Ext: 75% low back pain   Objective Measure 2   Objective Measure strength   Details core strength: poor   Objective Measure 3   Objective Measure flexibility   Details very tight hip flexors R>L, hamstrings R>L   Treatment Interventions (PT)   Interventions Therapeutic Procedure/Exercise;Neuromuscular Re-education   Therapeutic Procedure/Exercise   Therapeutic Procedures: strength, endurance, ROM, flexibillity minutes (25472) 30   Therapeutic Procedures Ther Proc 2   Ther Proc 1 Nustep, level 5 (SH9)   Ther Proc 1 - Details 5 min   Ther Proc 2 Treadmill   Ther Proc 2 - Details 1.5 mph, 5 min - L hip fatigues   PTRx Ther Proc 1 Standing Sideglide   PTRx Ther Proc 1 -  Details 10x L shoulder against wall   PTRx Ther Proc 2 Standing Hip Flexor Stretch   PTRx Ther Proc 2 - Details 2x 30 sec, cues for glutes   PTRx Ther Proc 3 Standing Hamstring Stretch   PTRx Ther Proc 3 - Details 2x 30 sec   PTRx Ther Proc 7 Clamshell Feet together   PTRx Ther Proc 7 - Details RTB x 15 reps B vc/mc for glute med - also to avoid rolling backwards and start in fully sidelying has a tendency to recline backwards   Patient Response/Progress tolerates well   PTRx Ther Proc 8 Bridging #1   PTRx Ther Proc 8 - Details x 20 reps vc for GS   PTRx Ther Proc 9 Sit to Stand   PTRx Ther Proc 9 - Details x 10 reps vc/vsc for proper hip strategy cues to slow down   Neuromuscular Re-education   Neuromuscular re-ed of mvmt, balance, coord, kinesthetic sense, posture, proprioception minutes (36116) 10   PTRx Neuro Re-ed 1 Supine Abdominal Exercise #4   PTRx Neuro Re-ed 1 - Details x 10 reps vc/mc for TrA improving continues to require reminds to squeeze gently has a tendency to valsalva - extra time required for cues   Patient Response/Progress continues to require max cues and extra time to avoid bracing and valsalva   Education   Learner/Method Patient;Listening;Demonstration;Pictures/Video   Plan   Home program See PTrx   Plan for next session progress core strength, add balance work w/ functional LE strengthening   Total Session Time   Timed Code Treatment Minutes 40   Total Treatment Time (sum of timed and untimed services) 40         [facility]                                                                                   OUTPATIENT PHYSICAL THERAPY    PLAN OF TREATMENT FOR OUTPATIENT REHABILITATION   Patient's Last Name, First Name, POP FarrFausto Carson YOB: 1940   Provider's Name   [facility]   Medical Record No.  [id number]     Onset Date: 11/21/23  Start of Care Date: 12/04/23     Medical Diagnosis:  Hip pain, right  Pain of  right thigh      PT Treatment Diagnosis:  Hip pain, right  Pain of right thigh    chronic R low back pain Plan of Treatment  Frequency/Duration: 1x/wk/ 10 wks    Certification date from 01/30/24 to 04/09/24         See note for plan of treatment details and functional goals     Roge Trujillo, PT                         I CERTIFY THE NEED FOR THESE SERVICES FURNISHED UNDER        THIS PLAN OF TREATMENT AND WHILE UNDER MY CARE     (Physician attestation of this document indicates review and certification of the therapy plan).              Referring Provider:  Any Alfred    Initial Assessment  See Epic Evaluation- Start of Care Date: 12/04/23            PLAN  Continue therapy per current plan of care.    Beginning/End Dates of Progress Note Reporting Period:  12/04/23 to 03/06/2024    Referring Provider:  Any Alfred

## 2024-03-13 ENCOUNTER — THERAPY VISIT (OUTPATIENT)
Dept: PHYSICAL THERAPY | Facility: CLINIC | Age: 84
End: 2024-03-13
Payer: MEDICARE

## 2024-03-13 DIAGNOSIS — M79.651 PAIN OF RIGHT THIGH: Primary | ICD-10-CM

## 2024-03-13 DIAGNOSIS — M25.551 HIP PAIN, RIGHT: ICD-10-CM

## 2024-03-13 DIAGNOSIS — G89.29 CHRONIC RIGHT-SIDED LOW BACK PAIN WITH RIGHT-SIDED SCIATICA: ICD-10-CM

## 2024-03-13 DIAGNOSIS — M54.41 CHRONIC RIGHT-SIDED LOW BACK PAIN WITH RIGHT-SIDED SCIATICA: ICD-10-CM

## 2024-03-13 PROCEDURE — 97110 THERAPEUTIC EXERCISES: CPT | Mod: GP | Performed by: PHYSICAL THERAPIST

## 2024-03-20 ENCOUNTER — ANTICOAGULATION THERAPY VISIT (OUTPATIENT)
Dept: ANTICOAGULATION | Facility: CLINIC | Age: 84
End: 2024-03-20

## 2024-03-20 ENCOUNTER — THERAPY VISIT (OUTPATIENT)
Dept: PHYSICAL THERAPY | Facility: CLINIC | Age: 84
End: 2024-03-20
Payer: MEDICARE

## 2024-03-20 ENCOUNTER — LAB (OUTPATIENT)
Dept: LAB | Facility: CLINIC | Age: 84
End: 2024-03-20
Payer: MEDICARE

## 2024-03-20 DIAGNOSIS — Z95.2 S/P AORTIC VALVE REPLACEMENT: Primary | ICD-10-CM

## 2024-03-20 DIAGNOSIS — Z95.2 S/P MITRAL VALVE REPLACEMENT: ICD-10-CM

## 2024-03-20 DIAGNOSIS — Z95.2 S/P AORTIC VALVE REPLACEMENT: ICD-10-CM

## 2024-03-20 DIAGNOSIS — Z79.01 LONG TERM CURRENT USE OF ANTICOAGULANT THERAPY: ICD-10-CM

## 2024-03-20 DIAGNOSIS — M25.551 HIP PAIN, RIGHT: ICD-10-CM

## 2024-03-20 DIAGNOSIS — M79.651 PAIN OF RIGHT THIGH: Primary | ICD-10-CM

## 2024-03-20 DIAGNOSIS — M54.41 CHRONIC RIGHT-SIDED LOW BACK PAIN WITH RIGHT-SIDED SCIATICA: ICD-10-CM

## 2024-03-20 DIAGNOSIS — G89.29 CHRONIC RIGHT-SIDED LOW BACK PAIN WITH RIGHT-SIDED SCIATICA: ICD-10-CM

## 2024-03-20 DIAGNOSIS — I48.11 LONGSTANDING PERSISTENT ATRIAL FIBRILLATION (H): ICD-10-CM

## 2024-03-20 LAB — INR BLD: 3.3 (ref 0.9–1.1)

## 2024-03-20 PROCEDURE — 36416 COLLJ CAPILLARY BLOOD SPEC: CPT

## 2024-03-20 PROCEDURE — 85610 PROTHROMBIN TIME: CPT

## 2024-03-20 PROCEDURE — 97110 THERAPEUTIC EXERCISES: CPT | Mod: GP | Performed by: PHYSICAL THERAPIST

## 2024-03-20 NOTE — PROGRESS NOTES
ANTICOAGULATION MANAGEMENT     Fausto Farr 83 year old male is on warfarin with therapeutic INR result. (Goal INR 2.5-3.5)    Recent labs: (last 7 days)     03/20/24  1034   INR 3.3*     ASSESSMENT     Source(s): Chart Review and Patient/Caregiver Call     Warfarin doses taken: Warfarin taken as instructed  Diet: No new diet changes identified  Medication/supplement changes: None noted  New illness, injury, or hospitalization: No  Signs or symptoms of bleeding or clotting: No  Previous result: Supratherapeutic  Additional findings: Warfarin maintenance dose was decreased at last 2 visits.  Patient traveling to [place] so next INR will be in 4 weeks versus the suggested 3 weeks.       PLAN     Recommended plan for no diet, medication or health factor changes affecting INR     Dosing Instructions: Continue your current warfarin dose with next INR in 4 weeks       Summary  As of 3/20/2024      Full warfarin instructions:  7.5 mg every Tue, Sat; 5 mg all other days   Next INR check:  4/17/2024               Telephone call with Ralph who verbalizes understanding and agrees to plan and who agrees to plan and repeated back plan correctly    Lab visit scheduled    Education provided:   Taking warfarin: Importance of taking warfarin as instructed    Plan made per [facility] anticoagulation protocol    [facility] Sampson, RN  Anticoagulation Clinic  3/20/2024    _______________________________________________________________________     Anticoagulation Episode Summary       Current INR goal:  2.5-3.5   TTR:  53.7% (1 y)   Target end date:  Indefinite   Send INR reminders to:  SHANNA DOWELL    Indications    S/P aortic valve replacement [Z95.2]  S/P mitral valve replacement [Z95.2]  Long term current use of anticoagulant therapy [Z79.01]  Longstanding persistent atrial fibrillation (H) [I48.11]             Comments:  Per 9/20/22 Cardio Note strict INR goal of 2.5-3.5. If INR falls below 2.5 at any time, needs to be bridged with  Lovenox. consent to leave DVM for medical information and scheduling             Anticoagulation Care Providers       Provider Role Specialty Phone number    Jodi Flower Mai, MD Referring Internal Medicine - Pediatrics [phone]

## 2024-03-25 ENCOUNTER — TRANSFERRED RECORDS (OUTPATIENT)
Dept: HEALTH INFORMATION MANAGEMENT | Facility: CLINIC | Age: 84
End: 2024-03-25
Payer: MEDICARE

## 2024-03-25 ENCOUNTER — TELEPHONE (OUTPATIENT)
Dept: CARDIOLOGY | Facility: CLINIC | Age: 84
End: 2024-03-25
Payer: MEDICARE

## 2024-03-25 NOTE — TELEPHONE ENCOUNTER
Patient left a voicemail wondering if anything has showed up on his heart monitor (has a [employer] PPM). States that he has been getting nauseous recently and noticed that his monitor has been lighting up in the middle of the night and gets so bright it wakes him up.    Called Ralph back. No answer. LVM letting him know that monitors light up frequently when they run different updates in the background... this does not have anything to do with his actual PPM and is not an indication that there have been any abnormal rhythms or that anything is wrong with his PPM. Let him know that he can put a dark towel over his monitor or place the monitor below his bed to block out the light so it does not continue to wake him. As for his symptoms of nausea... this could be due to any number of different reasons and is likely not due to his PPM or heart rhythms. That being said, he can send a manual transmission so we can be sure that he has not had any abnormal rhythms. Instructions provided on how to send a manual transmission if he chooses to do so (PII allowed on latitude). Number for [employer] provided in case he has any questions.  GABE RN

## 2024-03-27 ENCOUNTER — THERAPY VISIT (OUTPATIENT)
Dept: PHYSICAL THERAPY | Facility: CLINIC | Age: 84
End: 2024-03-27
Payer: MEDICARE

## 2024-03-27 ENCOUNTER — TRANSCRIBE ORDERS (OUTPATIENT)
Dept: OTHER | Age: 84
End: 2024-03-27

## 2024-03-27 DIAGNOSIS — M54.41 CHRONIC RIGHT-SIDED LOW BACK PAIN WITH RIGHT-SIDED SCIATICA: ICD-10-CM

## 2024-03-27 DIAGNOSIS — M79.651 PAIN OF RIGHT THIGH: Primary | ICD-10-CM

## 2024-03-27 DIAGNOSIS — M25.551 HIP PAIN, RIGHT: ICD-10-CM

## 2024-03-27 DIAGNOSIS — G89.29 CHRONIC RIGHT-SIDED LOW BACK PAIN WITH RIGHT-SIDED SCIATICA: ICD-10-CM

## 2024-03-27 PROCEDURE — 97110 THERAPEUTIC EXERCISES: CPT | Mod: GP | Performed by: PHYSICAL THERAPIST

## 2024-03-28 ENCOUNTER — OFFICE VISIT (OUTPATIENT)
Dept: VASCULAR SURGERY | Facility: CLINIC | Age: 84
End: 2024-03-28
Payer: MEDICARE

## 2024-03-28 DIAGNOSIS — I83.812 VARICOSE VEINS OF LEFT LOWER EXTREMITY WITH PAIN: Primary | ICD-10-CM

## 2024-03-28 PROCEDURE — 99207 PR VEINSOLUTIONS POST OPERATIVE VISIT: CPT | Performed by: SURGERY

## 2024-03-28 NOTE — PROGRESS NOTES
Mr. Farr returns for his 6-week follow-up.  He is a very pleasant and active 83-year-old gentleman who we treated with left great saphenous vein radiofrequency ablation and medically necessary stab phlebectomies on 13 February 2024.  He has no complaints.    Surgical sites are healing nicely.    He is going on a trip to [place] to spend time at her daily and [employer].    I look forward to our 6-month revisit.

## 2024-03-29 ENCOUNTER — TRANSFERRED RECORDS (OUTPATIENT)
Dept: HEALTH INFORMATION MANAGEMENT | Facility: CLINIC | Age: 84
End: 2024-03-29
Payer: MEDICARE

## 2024-04-03 ENCOUNTER — THERAPY VISIT (OUTPATIENT)
Dept: PHYSICAL THERAPY | Facility: CLINIC | Age: 84
End: 2024-04-03
Payer: MEDICARE

## 2024-04-03 DIAGNOSIS — M25.551 HIP PAIN, RIGHT: ICD-10-CM

## 2024-04-03 DIAGNOSIS — M79.651 PAIN OF RIGHT THIGH: Primary | ICD-10-CM

## 2024-04-03 DIAGNOSIS — M54.41 CHRONIC RIGHT-SIDED LOW BACK PAIN WITH RIGHT-SIDED SCIATICA: ICD-10-CM

## 2024-04-03 DIAGNOSIS — G89.29 CHRONIC RIGHT-SIDED LOW BACK PAIN WITH RIGHT-SIDED SCIATICA: ICD-10-CM

## 2024-04-03 PROCEDURE — 97110 THERAPEUTIC EXERCISES: CPT | Mod: GP | Performed by: PHYSICAL THERAPIST

## 2024-04-04 ENCOUNTER — DOCUMENTATION ONLY (OUTPATIENT)
Dept: CARDIOLOGY | Facility: CLINIC | Age: 84
End: 2024-04-04
Payer: MEDICARE

## 2024-04-04 NOTE — PROGRESS NOTES
This Device meets the FDA criteria for MRI approval.Signed by Dr Vasquez.   Faxed to [facility] radiology  Joy ANDERSON

## 2024-04-17 ENCOUNTER — THERAPY VISIT (OUTPATIENT)
Dept: PHYSICAL THERAPY | Facility: CLINIC | Age: 84
End: 2024-04-17
Payer: MEDICARE

## 2024-04-17 ENCOUNTER — ANTICOAGULATION THERAPY VISIT (OUTPATIENT)
Dept: ANTICOAGULATION | Facility: CLINIC | Age: 84
End: 2024-04-17

## 2024-04-17 ENCOUNTER — LAB (OUTPATIENT)
Dept: LAB | Facility: CLINIC | Age: 84
End: 2024-04-17
Payer: MEDICARE

## 2024-04-17 DIAGNOSIS — G89.29 CHRONIC RIGHT-SIDED LOW BACK PAIN WITH RIGHT-SIDED SCIATICA: ICD-10-CM

## 2024-04-17 DIAGNOSIS — M54.41 CHRONIC RIGHT-SIDED LOW BACK PAIN WITH RIGHT-SIDED SCIATICA: ICD-10-CM

## 2024-04-17 DIAGNOSIS — Z95.2 S/P AORTIC VALVE REPLACEMENT: ICD-10-CM

## 2024-04-17 DIAGNOSIS — Z95.2 S/P AORTIC VALVE REPLACEMENT: Primary | ICD-10-CM

## 2024-04-17 DIAGNOSIS — Z95.2 S/P MITRAL VALVE REPLACEMENT: ICD-10-CM

## 2024-04-17 DIAGNOSIS — M79.651 PAIN OF RIGHT THIGH: Primary | ICD-10-CM

## 2024-04-17 DIAGNOSIS — I48.11 LONGSTANDING PERSISTENT ATRIAL FIBRILLATION (H): ICD-10-CM

## 2024-04-17 DIAGNOSIS — M25.551 HIP PAIN, RIGHT: ICD-10-CM

## 2024-04-17 DIAGNOSIS — Z79.01 LONG TERM CURRENT USE OF ANTICOAGULANT THERAPY: ICD-10-CM

## 2024-04-17 LAB — INR BLD: 4.9 (ref 0.9–1.1)

## 2024-04-17 PROCEDURE — 36415 COLL VENOUS BLD VENIPUNCTURE: CPT

## 2024-04-17 PROCEDURE — 97110 THERAPEUTIC EXERCISES: CPT | Mod: GP | Performed by: PHYSICAL THERAPIST

## 2024-04-17 PROCEDURE — 85610 PROTHROMBIN TIME: CPT

## 2024-04-17 NOTE — PROGRESS NOTES
04/17/24 0500   Appointment Info   Signing clinician's name / credentials Marcus Trujillo, DPT   Total/Authorized Visits 8 (E&T)   Visits Used 11   Medical Diagnosis Hip pain, right  Pain of right thigh   PT Tx Diagnosis Hip pain, right  Pain of right thigh    chronic R low back pain   Other pertinent information rec return to MD if no progress   Quick Adds Certification   Progress Note/Certification   Start of Care Date 12/04/23   Onset of illness/injury or Date of Surgery 11/21/23   Therapy Frequency 1x/wk   Predicted Duration 4 weeks   Certification date from 04/10/24   Certification date to 05/08/24   Progress Note Due Date 01/29/24   Progress Note Completed Date 12/04/23   PT Goal 1   Goal Identifier walking   Goal Description Patient will be able to walk 15 min in order for household and community mobiilty.   Goal Progress Pt can walk 10-15 minutes   Target Date 05/08/24   Subjective Report   Subjective Report Pt is feeling good, making improvement, just got back from a road trip and tolerated lots of driving and walking.  Pt feels that he fatigues easily.  Pt is scheduled for a lumbar MRI next week   Objective Measures   Objective Measures Objective Measure 1;Objective Measure 2;Objective Measure 3   Objective Measure 1   Objective Measure AROM Lumbar   Details Flx: toes, Ext: 75%   Objective Measure 2   Objective Measure strength   Details core strength: fair/good   Objective Measure 3   Objective Measure flexibility   Details very tight hip flexors R>L, hamstrings R>L   Treatment Interventions (PT)   Interventions Therapeutic Procedure/Exercise;Neuromuscular Re-education   Therapeutic Procedure/Exercise   Therapeutic Procedures: strength, endurance, ROM, flexibility minutes (50891) 40   Therapeutic Procedures Ther Proc 2;Ther Proc 3;Ther Proc 4   Ther Proc 1 Nustep, level 5 (SH9)   Ther Proc 1 - Details 5 min   Ther Proc 2 assisted stretch   Ther Proc 2 - Details hamstrings, hip flexors, 2x 60 sec each with  C/R   Ther Proc 3 leg press   Ther Proc 3 - Details sled 4, 4 plates, 2x 10   PTRx Ther Proc 1 Sitting Flexion   PTRx Ther Proc 1 - Details 2x 10 - testing for HEP   PTRx Ther Proc 2 Standing Hip Flexor Stretch   PTRx Ther Proc 2 - Details 2x 30 sec   PTRx Ther Proc 3 Standing Hamstring Stretch   PTRx Ther Proc 3 - Details 2x 30 sec   PTRx Ther Proc 4 Clamshell with Theraband   PTRx Ther Proc 4 - Details 10x green   PTRx Ther Proc 5 Prone Plank Modified Knees   PTRx Ther Proc 5 - Details 2x 30 sec hold   PTRx Ther Proc 6 Supine Abdominal Exercise #4 (Leg Extension)   PTRx Ther Proc 6 - Details 15x each side   PTRx Ther Proc 7 Bridging #1   PTRx Ther Proc 7 - Details x 20 reps vc for GS   PTRx Ther Proc 8 Sit to Stand   PTRx Ther Proc 8 - Details 15x 8#   PTRx Ther Proc 9 Hip AROM Standing Extension   PTRx Ther Proc 9 - Details 15x each side   Patient Response/Progress tolerates well   Ther Proc 4 sidestep   Ther Proc 4 - Details 4x 10 blue   Neuromuscular Re-education   Patient Response/Progress continues to require max cues and extra time to avoid bracing and valsalva   Education   Learner/Method Patient;Listening;Demonstration;Pictures/Video   Plan   Home program See PTrx   Plan for next session progress core strength, add balance work w/ functional LE strengthening   Total Session Time   Timed Code Treatment Minutes 40   Total Treatment Time (sum of timed and untimed services) 40       [facility]                                                                                   OUTPATIENT PHYSICAL THERAPY    PLAN OF TREATMENT FOR OUTPATIENT REHABILITATION   Patient's Last Name, First Name, LISAFLAKO  Fausto Farr YOB: 1940   Provider's Name   [facility]   Medical Record No.  [id number]     Onset Date: 11/21/23  Start of Care Date: 12/04/23     Medical Diagnosis:  Hip pain, right  Pain of right thigh      PT Treatment Diagnosis:  Hip  pain, right  Pain of right thigh    chronic R low back pain Plan of Treatment  Frequency/Duration: 1x/wk/ 4 weeks    Certification date from 04/10/24 to 05/08/24         See note for plan of treatment details and functional goals     Roge Trujillo, PT                         I CERTIFY THE NEED FOR THESE SERVICES FURNISHED UNDER        THIS PLAN OF TREATMENT AND WHILE UNDER MY CARE     (Physician attestation of this document indicates review and certification of the therapy plan).              Referring Provider:  Any Alfred    Initial Assessment  See Epic Evaluation- Start of Care Date: 12/04/23            PLAN  Pt is going in for an MRI, will re-evaluate need for PT after results are discussed with MD.    Beginning/End Dates of Progress Note Reporting Period:  12/04/23 to 04/17/2024    Referring Provider:  Any Alfred

## 2024-04-17 NOTE — PROGRESS NOTES
"ANTICOAGULATION MANAGEMENT     Fausto Farr 83 year old male is on warfarin with supratherapeutic INR result. (Goal INR 2.5-3.5)    Recent labs: (last 7 days)     04/17/24  0924   INR 4.9*       ASSESSMENT     Source(s): Chart Review and Patient/Caregiver Call     Warfarin doses taken: Warfarin taken as instructed  Diet: No new diet changes identified  Medication/supplement changes:  Patient reports taking 2 tablets of tylenol in the morning and 2 tablets in the evening x 2 days. Patient also reports taking flexeril as needed.   New illness, injury, or hospitalization: No; however, patient had increased back pain over the past 2 days, otherwise, he reports his back pain has improved since starting physical therapy.  Signs or symptoms of bleeding or clotting: No  Previous result: Therapeutic last visit; previously outside of goal range  Additional findings: 03/28/24 vascular office visit for 6 week follow up-no concerns or change in care plan noted.  Patient reports his wife will be having knee surgery on 4/30/24 so his diet may be different, he reports he has \"lots of soup in the freezer\".       PLAN     Recommended plan for temporary change(s) affecting INR     Dosing Instructions: partial hold then continue your current warfarin dose with next INR in 9 days       Summary  As of 4/17/2024      Full warfarin instructions:  4/17: 2.5 mg; Otherwise 7.5 mg every Tue, Sat; 5 mg all other days   Next INR check:  4/26/2024               Telephone call with Ralph who verbalizes understanding and agrees to plan and who agrees to plan and repeated back plan correctly    Lab visit scheduled    Education provided:   Dietary considerations: importance of consistent vitamin K intake  How pain, inflammation and tylenol can affect INR    Plan made per [facility] anticoagulation protocol    Aure Sampson, RN  Anticoagulation Clinic  4/17/2024    _______________________________________________________________________ "     Anticoagulation Episode Summary       Current INR goal:  2.5-3.5   TTR:  48.1% (1 y)   Target end date:  Indefinite   Send INR reminders to:  SHANNA DOWELL    Indications    S/P aortic valve replacement [Z95.2]  S/P mitral valve replacement [Z95.2]  Long term current use of anticoagulant therapy [Z79.01]  Longstanding persistent atrial fibrillation (H) [I48.11]             Comments:  Per 9/20/22 Cardio Note strict INR goal of 2.5-3.5. If INR falls below 2.5 at any time, needs to be bridged with Lovenox. consent to leave DVM for medical information and scheduling             Anticoagulation Care Providers       Provider Role Specialty Phone number    Jodi Flower Mai, MD Referring Internal Medicine - Pediatrics [phone]

## 2024-04-18 ENCOUNTER — ANCILLARY PROCEDURE (OUTPATIENT)
Dept: CARDIOLOGY | Facility: CLINIC | Age: 84
End: 2024-04-18
Attending: INTERNAL MEDICINE
Payer: MEDICARE

## 2024-04-18 DIAGNOSIS — Z95.0 CARDIAC PACEMAKER IN SITU: ICD-10-CM

## 2024-04-18 PROCEDURE — 93294 REM INTERROG EVL PM/LDLS PM: CPT | Performed by: INTERNAL MEDICINE

## 2024-04-18 PROCEDURE — 93296 REM INTERROG EVL PM/IDS: CPT | Performed by: INTERNAL MEDICINE

## 2024-04-19 LAB
MDC_IDC_EPISODE_DTM: NORMAL
MDC_IDC_EPISODE_DURATION: 1 S
MDC_IDC_EPISODE_DURATION: 14 S
MDC_IDC_EPISODE_DURATION: 14 S
MDC_IDC_EPISODE_DURATION: 15 S
MDC_IDC_EPISODE_DURATION: 15 S
MDC_IDC_EPISODE_DURATION: 16 S
MDC_IDC_EPISODE_DURATION: 2 S
MDC_IDC_EPISODE_DURATION: 279 S
MDC_IDC_EPISODE_ID: NORMAL
MDC_IDC_EPISODE_TYPE: NORMAL
MDC_IDC_EPISODE_TYPE_INDUCED: NO
MDC_IDC_LEAD_CONNECTION_STATUS: NORMAL
MDC_IDC_LEAD_CONNECTION_STATUS: NORMAL
MDC_IDC_LEAD_IMPLANT_DT: NORMAL
MDC_IDC_LEAD_IMPLANT_DT: NORMAL
MDC_IDC_LEAD_LOCATION: NORMAL
MDC_IDC_LEAD_LOCATION: NORMAL
MDC_IDC_LEAD_LOCATION_DETAIL_1: NORMAL
MDC_IDC_LEAD_LOCATION_DETAIL_1: NORMAL
MDC_IDC_LEAD_MFG: NORMAL
MDC_IDC_LEAD_MFG: NORMAL
MDC_IDC_LEAD_MODEL: NORMAL
MDC_IDC_LEAD_MODEL: NORMAL
MDC_IDC_LEAD_POLARITY_TYPE: NORMAL
MDC_IDC_LEAD_POLARITY_TYPE: NORMAL
MDC_IDC_LEAD_SERIAL: NORMAL
MDC_IDC_LEAD_SERIAL: NORMAL
MDC_IDC_MSMT_BATTERY_DTM: NORMAL
MDC_IDC_MSMT_BATTERY_REMAINING_LONGEVITY: 114 MO
MDC_IDC_MSMT_BATTERY_REMAINING_PERCENTAGE: 100 %
MDC_IDC_MSMT_BATTERY_STATUS: NORMAL
MDC_IDC_MSMT_LEADCHNL_RA_IMPEDANCE_VALUE: 679 OHM
MDC_IDC_MSMT_LEADCHNL_RV_IMPEDANCE_VALUE: 588 OHM
MDC_IDC_MSMT_LEADCHNL_RV_PACING_THRESHOLD_AMPLITUDE: 1 V
MDC_IDC_MSMT_LEADCHNL_RV_PACING_THRESHOLD_PULSEWIDTH: 0.4 MS
MDC_IDC_PG_IMPLANT_DTM: NORMAL
MDC_IDC_PG_MFG: NORMAL
MDC_IDC_PG_MODEL: NORMAL
MDC_IDC_PG_SERIAL: NORMAL
MDC_IDC_PG_TYPE: NORMAL
MDC_IDC_SESS_CLINIC_NAME: NORMAL
MDC_IDC_SESS_DTM: NORMAL
MDC_IDC_SESS_TYPE: NORMAL
MDC_IDC_SET_BRADY_AT_MODE_SWITCH_MODE: NORMAL
MDC_IDC_SET_BRADY_AT_MODE_SWITCH_RATE: 170 {BEATS}/MIN
MDC_IDC_SET_BRADY_LOWRATE: 60 {BEATS}/MIN
MDC_IDC_SET_BRADY_MAX_SENSOR_RATE: 130 {BEATS}/MIN
MDC_IDC_SET_BRADY_MAX_TRACKING_RATE: 130 {BEATS}/MIN
MDC_IDC_SET_BRADY_MODE: NORMAL
MDC_IDC_SET_BRADY_PAV_DELAY_HIGH: 200 MS
MDC_IDC_SET_BRADY_PAV_DELAY_LOW: 250 MS
MDC_IDC_SET_BRADY_SAV_DELAY_HIGH: 200 MS
MDC_IDC_SET_BRADY_SAV_DELAY_LOW: 250 MS
MDC_IDC_SET_LEADCHNL_RA_PACING_AMPLITUDE: 5 V
MDC_IDC_SET_LEADCHNL_RA_PACING_CAPTURE_MODE: NORMAL
MDC_IDC_SET_LEADCHNL_RA_PACING_POLARITY: NORMAL
MDC_IDC_SET_LEADCHNL_RA_PACING_PULSEWIDTH: 0.4 MS
MDC_IDC_SET_LEADCHNL_RA_SENSING_ADAPTATION_MODE: NORMAL
MDC_IDC_SET_LEADCHNL_RA_SENSING_POLARITY: NORMAL
MDC_IDC_SET_LEADCHNL_RA_SENSING_SENSITIVITY: 0.25 MV
MDC_IDC_SET_LEADCHNL_RV_PACING_AMPLITUDE: 1.4 V
MDC_IDC_SET_LEADCHNL_RV_PACING_CAPTURE_MODE: NORMAL
MDC_IDC_SET_LEADCHNL_RV_PACING_POLARITY: NORMAL
MDC_IDC_SET_LEADCHNL_RV_PACING_PULSEWIDTH: 0.4 MS
MDC_IDC_SET_LEADCHNL_RV_SENSING_ADAPTATION_MODE: NORMAL
MDC_IDC_SET_LEADCHNL_RV_SENSING_POLARITY: NORMAL
MDC_IDC_SET_LEADCHNL_RV_SENSING_SENSITIVITY: 0.6 MV
MDC_IDC_SET_ZONE_DETECTION_INTERVAL: 375 MS
MDC_IDC_SET_ZONE_STATUS: NORMAL
MDC_IDC_SET_ZONE_TYPE: NORMAL
MDC_IDC_SET_ZONE_VENDOR_TYPE: NORMAL
MDC_IDC_STAT_AT_BURDEN_PERCENT: 11 %
MDC_IDC_STAT_AT_DTM_END: NORMAL
MDC_IDC_STAT_AT_DTM_START: NORMAL
MDC_IDC_STAT_BRADY_DTM_END: NORMAL
MDC_IDC_STAT_BRADY_DTM_START: NORMAL
MDC_IDC_STAT_BRADY_RA_PERCENT_PACED: 12 %
MDC_IDC_STAT_BRADY_RV_PERCENT_PACED: 93 %
MDC_IDC_STAT_EPISODE_RECENT_COUNT: 0
MDC_IDC_STAT_EPISODE_RECENT_COUNT: 0
MDC_IDC_STAT_EPISODE_RECENT_COUNT: 9
MDC_IDC_STAT_EPISODE_RECENT_COUNT_DTM_END: NORMAL
MDC_IDC_STAT_EPISODE_RECENT_COUNT_DTM_START: NORMAL
MDC_IDC_STAT_EPISODE_TYPE: NORMAL
MDC_IDC_STAT_EPISODE_VENDOR_TYPE: NORMAL
MDC_IDC_STAT_EPISODE_VENDOR_TYPE: NORMAL

## 2024-04-26 ENCOUNTER — LAB (OUTPATIENT)
Dept: LAB | Facility: CLINIC | Age: 84
End: 2024-04-26
Payer: MEDICARE

## 2024-04-26 ENCOUNTER — ANTICOAGULATION THERAPY VISIT (OUTPATIENT)
Dept: ANTICOAGULATION | Facility: CLINIC | Age: 84
End: 2024-04-26

## 2024-04-26 DIAGNOSIS — Z79.01 LONG TERM CURRENT USE OF ANTICOAGULANT THERAPY: ICD-10-CM

## 2024-04-26 DIAGNOSIS — Z95.2 S/P MITRAL VALVE REPLACEMENT: ICD-10-CM

## 2024-04-26 DIAGNOSIS — I48.11 LONGSTANDING PERSISTENT ATRIAL FIBRILLATION (H): ICD-10-CM

## 2024-04-26 DIAGNOSIS — Z95.2 S/P AORTIC VALVE REPLACEMENT: ICD-10-CM

## 2024-04-26 DIAGNOSIS — Z95.2 S/P AORTIC VALVE REPLACEMENT: Primary | ICD-10-CM

## 2024-04-26 LAB — INR BLD: 3.8 (ref 0.9–1.1)

## 2024-04-26 PROCEDURE — 85610 PROTHROMBIN TIME: CPT

## 2024-04-26 PROCEDURE — 36416 COLLJ CAPILLARY BLOOD SPEC: CPT

## 2024-04-26 NOTE — PROGRESS NOTES
ANTICOAGULATION MANAGEMENT     Fausto Farr 83 year old male is on warfarin with supratherapeutic INR result. (Goal INR 2.5-3.5)    Recent labs: (last 7 days)     04/26/24  1046   INR 3.8*       ASSESSMENT     Source(s): Chart Review and Patient/Caregiver Call     Warfarin doses taken: Warfarin taken as instructed  Diet: No new diet changes identified  Medication/supplement changes: Continues to take about 2000 mg of Tylenol daily.  New illness, injury, or hospitalization: re injured his back on Wednesday this week.  Continues to have pain.  Had MRI yesterday.  Signs or symptoms of bleeding or clotting: No  Previous result: Supratherapeutic  Additional findings: None       PLAN     Recommended plan for ongoing change(s) affecting INR     Dosing Instructions: decrease your warfarin dose (6% change) with next INR in 10 days       Summary  As of 4/26/2024      Full warfarin instructions:  7.5 mg every Tue; 5 mg all other days   Next INR check:  5/8/2024               Telephone call with Ralph who agrees to plan and repeated back plan correctly    Lab visit scheduled    Education provided:   Please call back if any changes to your diet, medications or how you've been taking warfarin  Interaction IS anticipated between warfarin and Tylenol if >2000 mg are taken daily.  Symptom monitoring: monitoring for bleeding signs and symptoms    Plan made per [facility] anticoagulation protocol    Ruthann Sanders RN  Anticoagulation Clinic  4/26/2024    _______________________________________________________________________     Anticoagulation Episode Summary       Current INR goal:  2.5-3.5   TTR:  48.0% (1 y)   Target end date:  Indefinite   Send INR reminders to:  SHANNA DOWELL    Indications    S/P aortic valve replacement [Z95.2]  S/P mitral valve replacement [Z95.2]  Long term current use of anticoagulant therapy [Z79.01]  Longstanding persistent atrial fibrillation (H) [I48.11]             Comments:  Per 9/20/22 Cardio Note  strict INR goal of 2.5-3.5. If INR falls below 2.5 at any time, needs to be bridged with Lovenox. consent to leave DVM for medical information and scheduling             Anticoagulation Care Providers       Provider Role Specialty Phone number    Jodi Flower Mai, MD Referring Internal Medicine - Pediatrics [phone]

## 2024-04-26 NOTE — PROGRESS NOTES
ANTICOAGULATION MANAGEMENT     Fausto Farr 83 year old male is on warfarin with supratherapeutic INR result. (Goal INR 2.5-3.5)    Recent labs: (last 7 days)     04/26/24  1046   INR 3.8*       ASSESSMENT     Source(s): Chart Review  Previous INR was Supratherapeutic  Medication, diet, health changes since last INR chart reviewed; none identified         PLAN     Unable to reach Ralph today.    Left message for patient to call INR clinic to discuss result.    Follow up required to confirm warfarin dose taken and assess for changes    Ruthann Sanders RN  Anticoagulation Clinic  4/26/2024

## 2024-04-29 ENCOUNTER — TELEPHONE (OUTPATIENT)
Dept: CARDIOLOGY | Facility: CLINIC | Age: 84
End: 2024-04-29
Payer: MEDICARE

## 2024-04-29 NOTE — TELEPHONE ENCOUNTER
Remote device check received.    [facility] [employer] (D) Remote PPM Device Check - Courtesy check for SOB  AP: 11 %    : 92 %    Mode: DDD 60/130  (Fallback DDIR 70) Mode switched appropriately  Presenting Rhythm: AF with V rates 70's bpm  (event in progress 4/25/24 @ 1125)  Historical Underlying Rhythm: SR 60-70s with CHB and JE in the 30-40s bpm with PVCs per clinic check 9/6/2023  Sensing: Stable    Pacing Threshold: Stable    Impedance: RA CARSON, RV WNL    Battery Status: estimated 8.5 years until RRT/SHANA    Atrial Arrhythmia: AF burden 15%      Device appropriate post MRI.  Pt was at an appointment at time of callback, info discussed with pt katarzyna Chaney.  Confirmed that pt has been taking his warfarin as scheduled.  Reassured spouse that device is working appropriately and suspect that the SOB is due to rhythm.  Discussed options for AF if pt decides he would like to pursue due to symptoms.    Encouraged spouse to have pt call us if he has more questions or concerns after speaking with her.    JUSTIN Courtney

## 2024-04-29 NOTE — TELEPHONE ENCOUNTER
"Pt reports that he recently had an MRI and has noted that he is:winded\" very easily after MRI occurred.  Requested pt to send remote transmission to assess.    JUSTIN Courtney  "

## 2024-05-01 ENCOUNTER — TELEPHONE (OUTPATIENT)
Dept: CARDIOLOGY | Facility: CLINIC | Age: 84
End: 2024-05-01
Payer: MEDICARE

## 2024-05-01 ENCOUNTER — NURSE TRIAGE (OUTPATIENT)
Dept: CARDIOLOGY | Facility: CLINIC | Age: 84
End: 2024-05-01
Payer: MEDICARE

## 2024-05-01 DIAGNOSIS — I49.5 SICK SINUS SYNDROME (H): Primary | ICD-10-CM

## 2024-05-01 DIAGNOSIS — Z95.0 CARDIAC PACEMAKER IN SITU: ICD-10-CM

## 2024-05-01 NOTE — TELEPHONE ENCOUNTER
"Patient was transferred from [facility] regarding shortness of breath.  Patient has a PPM. He states the SOB mainly occurs with activity. Minor SOB with rest. Patient was told he has had A-Fib since 4/25. Patient was advised if he were to continue with symptoms to call. Concurrently he feels tired, and tommie weak. Patient does not sound very short of breath over the phone. He has no vitals to report for today but says his blood pressure has been good. No reports of having any chest discomfort.   Advised this will be routed to the PPM team to further review with patient. He verbalized understanding.     1. RESPIRATORY STATUS: \"Describe your breathing?\" (e.g., wheezing, shortness of breath, unable to speak, severe coughing) SOB.  2. ONSET: \"When did this breathing problem begin?\"  3. PATTERN \"Does the difficult breathing come and go, or has it been constant since it started?\" With activity. A little with rest.  4. SEVERITY: \"How bad is your breathing?\" (e.g., mild, moderate, severe) Mild. Moderate with activity.   - MILD: No SOB at rest, mild SOB with walking, speaks normally in sentences, can lie down, no retractions, pulse < 100.  - MODERATE: SOB at rest, SOB with minimal exertion and prefers to sit, cannot lie down flat, speaks in phrases, mild retractions, audible wheezing, pulse 100-120.  - SEVERE: Very SOB at rest, speaks in single words, struggling to breathe, sitting hunched forward, retractions, pulse > 120  5. RECURRENT SYMPTOM: \"Have you had difficulty breathing before?\" If Yes, ask: \"When was the last time?\" and \"What happened that time?\"  6. CARDIAC HISTORY: \"Do you have any history of heart disease?\" (e.g., heart attack, angina, bypass surgery, angioplasty) MVR, A-Fib, Heart Failure, CABG  7. LUNG HISTORY: \"Do you have any history of lung disease?\" (e.g., pulmonary embolus, asthma, emphysema) Former smoker.   8. CAUSE: \"What do you think is causing the breathing problem?\" Cardiac.   9. OTHER SYMPTOMS: \"Do you " "have any other symptoms? (e.g., dizziness, runny nose, cough, chest pain, fever) Weak.  10. O2 SATURATION MONITOR: \"Do you use an oxygen saturation monitor (pulse oximeter) at home?\" If Yes, ask: \"What is your reading (oxygen level) today?\" \"What is your usual oxygen saturation reading?\" (e.g., 95%) Not reported.     Additional Information   Negative: SEVERE difficulty breathing (e.g., struggling for each breath, speaks in single words, pulse > 120)   Negative: Bluish (or gray) lips or face   Negative: Passed out (i.e., fainted, collapsed and was not responding)   Negative: Stridor (harsh sound while breathing in)   Negative: Slow, shallow and weak breathing   Negative: Chest pain    Protocols used: Breathing Difficulty-A-OH    "

## 2024-05-01 NOTE — TELEPHONE ENCOUNTER
Called Pt he states he called device clinic after MRI was done, and was told he is in afib, he was checking to make sure device still working properly. Pt states with activity he becomes SON, and fatigued, at rest he is fine with no issues. Advised him if symptoms progress and get worse he needs to go to ER, otherwise  will message scheduling for urgent Office visit preferred in [place] with anybody , but will come to [facility]. MAHSA Rich RN

## 2024-05-01 NOTE — TELEPHONE ENCOUNTER
[facility] Call Center    Phone Message    May a detailed message be left on voicemail: yes     Reason for Call: Other: Pt would like a call back asap as he is in afib and shortness of breath, transferred to triage     Action Taken: Other: Cardio    Travel Screening: Not Applicable

## 2024-05-01 NOTE — TELEPHONE ENCOUNTER
Received below message from triage.  Per review on [facility] site, patient has been in persistent AF since 3/25/24.  Rates have been controlled.  Patient is on Warfarin and Toprol XL.      Patient's device is programmed DDD  (mode switch of VDIR 70).  Underlying rhythm SR in the 60's with CHB and JE at VVI 30 per 9/6/23.      Called and left message on both patient home line and cell phone requesting a call back to review symptoms.  Device clinic phone number provided for call back.     JUSTIN Yates     [id number] Addendum: Received call back from patient.  He stated that for the last 1-2 months he has noted increasing shortness of breath whenever he goes to do something - walking outside and back in, going up the 5 steps to the kitchen, walking to the basement and back.  He stated he does not have any swelling and has not gained any weight (2-3 pounds in a day or 5 pounds in a week).  He has been in AF since 3/25/24 and his device has been in mode switch, VDIR, using the rate response.  Rates in AF per histogram are much lower than when he is not in AF.  Explained that we may need to make the  rate response a little more aggressive to give him more heart rate support while he is in AF. Patient verbalized understanding and agreement with the plan.  Courtesy check to adjust rate response scheduled for 5/2/24.  JUSTIN Yates          Ventricular Histogram during SR (mode DDD  with AP 16%):       Ventricular Histograms during AF (mode VDIR 70, hx CHB with  87-93%)          Graphic trends showing AF since 3/25/24:         Mode Switch/rate response settings:

## 2024-05-02 ENCOUNTER — ANCILLARY PROCEDURE (OUTPATIENT)
Dept: CARDIOLOGY | Facility: CLINIC | Age: 84
End: 2024-05-02
Attending: INTERNAL MEDICINE
Payer: MEDICARE

## 2024-05-02 DIAGNOSIS — I49.5 SICK SINUS SYNDROME (H): ICD-10-CM

## 2024-05-02 DIAGNOSIS — Z95.0 CARDIAC PACEMAKER IN SITU: ICD-10-CM

## 2024-05-02 NOTE — TELEPHONE ENCOUNTER
Patient presented to the clinic today for a courtesy check of his PPM. He has been in persistent AFL since 3/25/24. (He has a history of AFlutter, s/p ablation 4/22/19. This is he first persistent recurrence since then. He remains on warfarin - history of mechanical aortic and mitral valves).    Underlying Rhythm: AFl w/ CHB, JE at 43-44bpm    Heart Rate: excellent variability when in SR, minimal variability when in AF. Spoke with patient in depth about AFL and atrial kick as well as the purpose and function of rate response. Changes made today with slight improvement in symptoms.   Patient's symptoms of extreme SOB/weakness/dizziness started over the last 1.5 months. Prior to that, he had not been very active over the winter months. His activity is limited by chronic back pain. He would note some SOB after about 5 minutes of activity. There is some deconditioning but his acute symptoms line up with when he went into AFL.    Aggressive changes made to rate response today with slight improvement in his symptoms. Patient is looking forward to his appt with Dr. Amaro to discuss options in regards to his AFL.    GABE ANDERSON

## 2024-05-04 LAB
MDC_IDC_LEAD_CONNECTION_STATUS: NORMAL
MDC_IDC_LEAD_CONNECTION_STATUS: NORMAL
MDC_IDC_LEAD_IMPLANT_DT: NORMAL
MDC_IDC_LEAD_IMPLANT_DT: NORMAL
MDC_IDC_LEAD_LOCATION: NORMAL
MDC_IDC_LEAD_LOCATION: NORMAL
MDC_IDC_LEAD_LOCATION_DETAIL_1: NORMAL
MDC_IDC_LEAD_LOCATION_DETAIL_1: NORMAL
MDC_IDC_LEAD_MFG: NORMAL
MDC_IDC_LEAD_MFG: NORMAL
MDC_IDC_LEAD_MODEL: NORMAL
MDC_IDC_LEAD_MODEL: NORMAL
MDC_IDC_LEAD_POLARITY_TYPE: NORMAL
MDC_IDC_LEAD_POLARITY_TYPE: NORMAL
MDC_IDC_LEAD_SERIAL: NORMAL
MDC_IDC_LEAD_SERIAL: NORMAL
MDC_IDC_MSMT_BATTERY_DTM: NORMAL
MDC_IDC_MSMT_BATTERY_REMAINING_LONGEVITY: 108 MO
MDC_IDC_MSMT_BATTERY_REMAINING_PERCENTAGE: 100 %
MDC_IDC_MSMT_BATTERY_STATUS: NORMAL
MDC_IDC_MSMT_LEADCHNL_RA_IMPEDANCE_VALUE: 693 OHM
MDC_IDC_MSMT_LEADCHNL_RV_IMPEDANCE_VALUE: 580 OHM
MDC_IDC_MSMT_LEADCHNL_RV_PACING_THRESHOLD_AMPLITUDE: 0.9 V
MDC_IDC_MSMT_LEADCHNL_RV_PACING_THRESHOLD_PULSEWIDTH: 0.4 MS
MDC_IDC_PG_IMPLANT_DTM: NORMAL
MDC_IDC_PG_MFG: NORMAL
MDC_IDC_PG_MODEL: NORMAL
MDC_IDC_PG_SERIAL: NORMAL
MDC_IDC_PG_TYPE: NORMAL
MDC_IDC_SESS_CLINIC_NAME: NORMAL
MDC_IDC_SESS_DTM: NORMAL
MDC_IDC_SESS_TYPE: NORMAL
MDC_IDC_SET_BRADY_AT_MODE_SWITCH_MODE: NORMAL
MDC_IDC_SET_BRADY_AT_MODE_SWITCH_RATE: 170 {BEATS}/MIN
MDC_IDC_SET_BRADY_LOWRATE: 60 {BEATS}/MIN
MDC_IDC_SET_BRADY_MAX_SENSOR_RATE: 130 {BEATS}/MIN
MDC_IDC_SET_BRADY_MAX_TRACKING_RATE: 130 {BEATS}/MIN
MDC_IDC_SET_BRADY_MODE: NORMAL
MDC_IDC_SET_BRADY_PAV_DELAY_HIGH: 200 MS
MDC_IDC_SET_BRADY_PAV_DELAY_LOW: 250 MS
MDC_IDC_SET_BRADY_SAV_DELAY_HIGH: 200 MS
MDC_IDC_SET_BRADY_SAV_DELAY_LOW: 250 MS
MDC_IDC_SET_LEADCHNL_RA_PACING_AMPLITUDE: 5 V
MDC_IDC_SET_LEADCHNL_RA_PACING_CAPTURE_MODE: NORMAL
MDC_IDC_SET_LEADCHNL_RA_PACING_POLARITY: NORMAL
MDC_IDC_SET_LEADCHNL_RA_PACING_PULSEWIDTH: 0.4 MS
MDC_IDC_SET_LEADCHNL_RA_SENSING_ADAPTATION_MODE: NORMAL
MDC_IDC_SET_LEADCHNL_RA_SENSING_POLARITY: NORMAL
MDC_IDC_SET_LEADCHNL_RA_SENSING_SENSITIVITY: 0.25 MV
MDC_IDC_SET_LEADCHNL_RV_PACING_AMPLITUDE: 1.4 V
MDC_IDC_SET_LEADCHNL_RV_PACING_CAPTURE_MODE: NORMAL
MDC_IDC_SET_LEADCHNL_RV_PACING_POLARITY: NORMAL
MDC_IDC_SET_LEADCHNL_RV_PACING_PULSEWIDTH: 0.4 MS
MDC_IDC_SET_LEADCHNL_RV_SENSING_ADAPTATION_MODE: NORMAL
MDC_IDC_SET_LEADCHNL_RV_SENSING_POLARITY: NORMAL
MDC_IDC_SET_LEADCHNL_RV_SENSING_SENSITIVITY: 0.6 MV
MDC_IDC_SET_ZONE_DETECTION_INTERVAL: 375 MS
MDC_IDC_SET_ZONE_STATUS: NORMAL
MDC_IDC_SET_ZONE_TYPE: NORMAL
MDC_IDC_SET_ZONE_VENDOR_TYPE: NORMAL
MDC_IDC_STAT_AT_BURDEN_PERCENT: 16 %
MDC_IDC_STAT_AT_DTM_END: NORMAL
MDC_IDC_STAT_AT_DTM_START: NORMAL
MDC_IDC_STAT_BRADY_DTM_END: NORMAL
MDC_IDC_STAT_BRADY_DTM_START: NORMAL
MDC_IDC_STAT_BRADY_RA_PERCENT_PACED: 10 %
MDC_IDC_STAT_BRADY_RV_PERCENT_PACED: 92 %
MDC_IDC_STAT_EPISODE_RECENT_COUNT: 0
MDC_IDC_STAT_EPISODE_RECENT_COUNT: 1
MDC_IDC_STAT_EPISODE_RECENT_COUNT: 9
MDC_IDC_STAT_EPISODE_RECENT_COUNT_DTM_END: NORMAL
MDC_IDC_STAT_EPISODE_RECENT_COUNT_DTM_START: NORMAL
MDC_IDC_STAT_EPISODE_TYPE: NORMAL
MDC_IDC_STAT_EPISODE_VENDOR_TYPE: NORMAL
MDC_IDC_STAT_EPISODE_VENDOR_TYPE: NORMAL

## 2024-05-06 NOTE — PROGRESS NOTES
CARDIOLOGY VISIT    REASON FOR VISIT: CAD, valve disease, arrhythmia    SUBJECTIVE:  84-year-old male seen for CAD, valve disease, and arrhythmia.  He has GAGE, venous insufficiency, AAA status post EVAR, carotid artery disease, subacute left occipital infarct.    He has had a flutter ablation, he has paroxysmal A-fib with frequent PVCs and NSVT.  2022 he underwent dual-chamber pacemaker ([employer] MRI compatible) for complete AV block.    He also has previous CABG with mechanical MVR and AVR.    Echo 2022 showed EF 60%, normal RV, MVR with mean 6 mmHg, AVR with mean 11 mmHg.    Pacemaker check May 2024 showed 10% a paced, 92% V paced, battery 9 years, underlying rhythm atrial flutter with complete heart block, atrial flutter started March 25.    He has been more fatigued since he went into flutter in March.  However he is mostly limited by low back pain.  He can only walk a few minutes with a walker before his back hurts.  He denies any palpitations, chest pain, or edema.  He is compliant with his warfarin, INR levels have been over 2 for the past 2 months.    MEDICATIONS:  Current Outpatient Medications   Medication Sig Dispense Refill    acetaminophen (TYLENOL) 325 MG tablet Take 2 tablets (650 mg) by mouth every 6 hours as needed for mild pain or other (and adjunct with moderate or severe pain or per patient request) 100 tablet 0    amoxicillin-clavulanate (AUGMENTIN) 875-125 MG tablet Take 1 tablet by mouth daily      betamethasone valerate (VALISONE) 0.1 % external lotion Apply topically 2 times daily Apply topically to scalp twice daily PRN 60 mL 3    cholecalciferol 25 MCG (1000 UT) TABS Take 1,000 Units by mouth daily      ezetimibe (ZETIA) 10 MG tablet Take 1 tablet (10 mg) by mouth daily 100 tablet 3    ferrous sulfate (FEROSUL) 325 (65 Fe) MG tablet Take 325 mg by mouth daily (with breakfast)      fluocinonide (LIDEX) 0.05 % external ointment Apply topically 2 times daily 60 g 11     "glucosamine-chondroitin 500-400 MG CAPS per capsule Take 1 capsule by mouth every evening      HYDROcodone-acetaminophen (NORCO) 5-325 MG tablet Take 1 tablet by mouth every 6 hours as needed for severe pain 15 tablet 0    mesalamine (ASACOL HD) 800 MG EC tablet Take 1 tablet (800 mg) by mouth 2 times daily 200 tablet 3    metoprolol succinate ER (TOPROL XL) 50 MG 24 hr tablet Take 1 1/2 tab (75mg) daily 135 tablet 3    rosuvastatin (CRESTOR) 40 MG tablet Take 1 tablet (40 mg) by mouth daily 100 tablet 3    tamsulosin (FLOMAX) 0.4 MG capsule Take 1 capsule (0.4 mg) by mouth daily 90 capsule 3    triamcinolone (KENALOG) 0.1 % external cream Apply topically 2 times daily 85.2 g 1    warfarin ANTICOAGULANT (COUMADIN) 5 MG tablet Take 1 1/2 tablets (7.5mg) every Friday / Take 1 tablet (5mg) all other days OR per INR clinic 95 tablet 1     No current facility-administered medications for this visit.       ALLERGIES:  Allergies   Allergen Reactions    Bees Anaphylaxis       REVIEW OF SYSTEMS:  Constitutional:  No weight loss, fever, chills  HEENT:  Eyes:  No visual loss, blurred vision, double vision or yellow sclerae. No hearing loss, sneezing, congestion, runny nose or sore throat.  Skin:  No rash or itching.  Cardiovascular: per HPI  Respiratory: per HPI  GI:  No anorexia, nausea, vomiting or diarrhea. No abdominal pain or blood.  :  No dysurea, hematuria  Neurologic:  No headache, paralysis, ataxia, numbness or tingling in the extremities. No change in bowel or bladder control.  Musculoskeletal:  No muscle pain  Hematologic:  No bleeding or bruising.  Lymphatics:  No enlarged nodes. No history of splenectomy.  Endocrine:  No reports of sweating, cold or heat intolerance. No polyuria or polydipsia.  Allergies:  No history of asthma, hives, eczema or rhinitis.    PHYSICAL EXAM:  /66 (BP Location: Right arm, Patient Position: Sitting, Cuff Size: Adult Regular)   Pulse 70   Ht 1.651 m (5' 5\")   Wt 96.2 kg (212 " lb)   SpO2 96%   BMI 35.28 kg/m    Constitutional: awake, alert, no distress  Eyes: PERRL, sclera nonicteric  ENT: trachea midline  Respiratory: Lungs clear  Cardiovascular: Regular rate and rhythm, crisp mechanical valve sounds, no edema  GI: nondistended, nontender, bowel sounds present  Lymph/Hematologic: no lymphadenopathy  Skin: dry, no rash  Musculoskeletal: good muscle tone, strength 5/5 in upper and lower extremities  Neurologic: no focal deficits  Neuropsychiatric: appropriate affact    DATA:  Lab: February 2024: Potassium 4.2, creatinine 1.0  Recent Labs   Lab Test 08/01/23  0917 07/27/22  0822   CHOL 122 123   HDL 31* 34*   LDL 57 64   TRIG 172* 124     ASSESSMENT:  84-year-old male seen for CAD, valve disease, and atrial flutter.  He has been in atrial flutter since March.  He has some dyspnea, but fortunately no heart failure symptoms.  We talked about the treatment options and he is agreeable to cardioversion.  Cannot tell from device interrogation if this would be typical atypical flutter.  If he had recurrence, could consider ablation if amenable to it.  He is already on warfarin, his INR has been therapeutic over the past 2 months.    RECOMMENDATIONS:  1.  Atrial flutter  -Cardioversion in the next 1 to 2 weeks, to be done at [facility], pacemaker rep probably should be present in case device changes need to be made  -Continue warfarin    2.  CAD  -Stable, continue current medications    3.  Status post aortic and mitral valve replacements  -Continue warfarin    Follow-up in about 1 month in [place] after cardioversion.    Humphrey Amaro MD  Cardiology - [facility]  Pager:  [phone]  Text Page  May 8, 2024

## 2024-05-07 ENCOUNTER — TRANSFERRED RECORDS (OUTPATIENT)
Dept: HEALTH INFORMATION MANAGEMENT | Facility: CLINIC | Age: 84
End: 2024-05-07
Payer: MEDICARE

## 2024-05-08 ENCOUNTER — OFFICE VISIT (OUTPATIENT)
Dept: CARDIOLOGY | Facility: CLINIC | Age: 84
End: 2024-05-08
Payer: MEDICARE

## 2024-05-08 ENCOUNTER — LAB (OUTPATIENT)
Dept: LAB | Facility: CLINIC | Age: 84
End: 2024-05-08
Payer: MEDICARE

## 2024-05-08 ENCOUNTER — ANTICOAGULATION THERAPY VISIT (OUTPATIENT)
Dept: ANTICOAGULATION | Facility: CLINIC | Age: 84
End: 2024-05-08

## 2024-05-08 VITALS
DIASTOLIC BLOOD PRESSURE: 66 MMHG | BODY MASS INDEX: 35.32 KG/M2 | HEIGHT: 65 IN | HEART RATE: 70 BPM | WEIGHT: 212 LBS | SYSTOLIC BLOOD PRESSURE: 130 MMHG | OXYGEN SATURATION: 96 %

## 2024-05-08 DIAGNOSIS — I48.11 LONGSTANDING PERSISTENT ATRIAL FIBRILLATION (H): ICD-10-CM

## 2024-05-08 DIAGNOSIS — Z95.2 S/P AORTIC VALVE REPLACEMENT: Primary | ICD-10-CM

## 2024-05-08 DIAGNOSIS — Z95.2 S/P MITRAL VALVE REPLACEMENT: ICD-10-CM

## 2024-05-08 DIAGNOSIS — Z79.01 LONG TERM CURRENT USE OF ANTICOAGULANT THERAPY: ICD-10-CM

## 2024-05-08 DIAGNOSIS — I48.4 ATYPICAL ATRIAL FLUTTER (H): Primary | ICD-10-CM

## 2024-05-08 DIAGNOSIS — Z95.2 S/P AORTIC VALVE REPLACEMENT: ICD-10-CM

## 2024-05-08 LAB — INR BLD: 4.7 (ref 0.9–1.1)

## 2024-05-08 PROCEDURE — 85610 PROTHROMBIN TIME: CPT

## 2024-05-08 PROCEDURE — 99214 OFFICE O/P EST MOD 30 MIN: CPT | Performed by: INTERNAL MEDICINE

## 2024-05-08 PROCEDURE — 36415 COLL VENOUS BLD VENIPUNCTURE: CPT

## 2024-05-08 NOTE — LETTER
5/8/2024    Chris Flower MD  [address] [place] [address]    RE: Fausto Farr       Dear Colleague,     I had the pleasure of seeing Fausto Farr in the [facility].  CARDIOLOGY VISIT    REASON FOR VISIT: CAD, valve disease, arrhythmia    SUBJECTIVE:  84-year-old male seen for CAD, valve disease, and arrhythmia.  He has GAGE, venous insufficiency, AAA status post EVAR, carotid artery disease, subacute left occipital infarct.    He has had a flutter ablation, he has paroxysmal A-fib with frequent PVCs and NSVT.  2022 he underwent dual-chamber pacemaker (Candid io MRI compatible) for complete AV block.    He also has previous CABG with mechanical MVR and AVR.    Echo 2022 showed EF 60%, normal RV, MVR with mean 6 mmHg, AVR with mean 11 mmHg.    Pacemaker check May 2024 showed 10% a paced, 92% V paced, battery 9 years, underlying rhythm atrial flutter with complete heart block, atrial flutter started March 25.    He has been more fatigued since he went into flutter in March.  However he is mostly limited by low back pain.  He can only walk a few minutes with a walker before his back hurts.  He denies any palpitations, chest pain, or edema.  He is compliant with his warfarin, INR levels have been over 2 for the past 2 months.    MEDICATIONS:  Current Outpatient Medications   Medication Sig Dispense Refill    acetaminophen (TYLENOL) 325 MG tablet Take 2 tablets (650 mg) by mouth every 6 hours as needed for mild pain or other (and adjunct with moderate or severe pain or per patient request) 100 tablet 0    amoxicillin-clavulanate (AUGMENTIN) 875-125 MG tablet Take 1 tablet by mouth daily      betamethasone valerate (VALISONE) 0.1 % external lotion Apply topically 2 times daily Apply topically to scalp twice daily PRN 60 mL 3    cholecalciferol 25 MCG (1000 UT) TABS Take 1,000 Units by mouth daily      ezetimibe (ZETIA) 10 MG tablet Take 1 tablet (10 mg) by mouth  daily 100 tablet 3    ferrous sulfate (FEROSUL) 325 (65 Fe) MG tablet Take 325 mg by mouth daily (with breakfast)      fluocinonide (LIDEX) 0.05 % external ointment Apply topically 2 times daily 60 g 11    glucosamine-chondroitin 500-400 MG CAPS per capsule Take 1 capsule by mouth every evening      HYDROcodone-acetaminophen (NORCO) 5-325 MG tablet Take 1 tablet by mouth every 6 hours as needed for severe pain 15 tablet 0    mesalamine (ASACOL HD) 800 MG EC tablet Take 1 tablet (800 mg) by mouth 2 times daily 200 tablet 3    metoprolol succinate ER (TOPROL XL) 50 MG 24 hr tablet Take 1 1/2 tab (75mg) daily 135 tablet 3    rosuvastatin (CRESTOR) 40 MG tablet Take 1 tablet (40 mg) by mouth daily 100 tablet 3    tamsulosin (FLOMAX) 0.4 MG capsule Take 1 capsule (0.4 mg) by mouth daily 90 capsule 3    triamcinolone (KENALOG) 0.1 % external cream Apply topically 2 times daily 85.2 g 1    warfarin ANTICOAGULANT (COUMADIN) 5 MG tablet Take 1 1/2 tablets (7.5mg) every Friday / Take 1 tablet (5mg) all other days OR per INR clinic 95 tablet 1     No current facility-administered medications for this visit.       ALLERGIES:  Allergies   Allergen Reactions    Bees Anaphylaxis       REVIEW OF SYSTEMS:  Constitutional:  No weight loss, fever, chills  HEENT:  Eyes:  No visual loss, blurred vision, double vision or yellow sclerae. No hearing loss, sneezing, congestion, runny nose or sore throat.  Skin:  No rash or itching.  Cardiovascular: per HPI  Respiratory: per HPI  GI:  No anorexia, nausea, vomiting or diarrhea. No abdominal pain or blood.  :  No dysurea, hematuria  Neurologic:  No headache, paralysis, ataxia, numbness or tingling in the extremities. No change in bowel or bladder control.  Musculoskeletal:  No muscle pain  Hematologic:  No bleeding or bruising.  Lymphatics:  No enlarged nodes. No history of splenectomy.  Endocrine:  No reports of sweating, cold or heat intolerance. No polyuria or polydipsia.  Allergies:  No  "history of asthma, hives, eczema or rhinitis.    PHYSICAL EXAM:  /66 (BP Location: Right arm, Patient Position: Sitting, Cuff Size: Adult Regular)   Pulse 70   Ht 1.651 m (5' 5\")   Wt 96.2 kg (212 lb)   SpO2 96%   BMI 35.28 kg/m    Constitutional: awake, alert, no distress  Eyes: PERRL, sclera nonicteric  ENT: trachea midline  Respiratory: Lungs clear  Cardiovascular: Regular rate and rhythm, crisp mechanical valve sounds, no edema  GI: nondistended, nontender, bowel sounds present  Lymph/Hematologic: no lymphadenopathy  Skin: dry, no rash  Musculoskeletal: good muscle tone, strength 5/5 in upper and lower extremities  Neurologic: no focal deficits  Neuropsychiatric: appropriate affact    DATA:  Lab: February 2024: Potassium 4.2, creatinine 1.0  Recent Labs   Lab Test 08/01/23  0917 07/27/22  0822   CHOL 122 123   HDL 31* 34*   LDL 57 64   TRIG 172* 124     ASSESSMENT:  84-year-old male seen for CAD, valve disease, and atrial flutter.  He has been in atrial flutter since March.  He has some dyspnea, but fortunately no heart failure symptoms.  We talked about the treatment options and he is agreeable to cardioversion.  Cannot tell from device interrogation if this would be typical atypical flutter.  If he had recurrence, could consider ablation if amenable to it.  He is already on warfarin, his INR has been therapeutic over the past 2 months.    RECOMMENDATIONS:  1.  Atrial flutter  -Cardioversion in the next 1 to 2 weeks, to be done at [facility], pacemaker rep probably should be present in case device changes need to be made  -Continue warfarin    2.  CAD  -Stable, continue current medications    3.  Status post aortic and mitral valve replacements  -Continue warfarin    Follow-up in about 1 month in [place] after cardioversion.    Humphrey Amaro MD  Cardiology - [facility]  Pager:  [phone]  Text Page  May 8, 2024        Thank you for allowing me to participate in the care of your " patient.      Sincerely,     Humphrey Amaro MD     [facility]  cc:   No referring provider defined for this encounter.

## 2024-05-08 NOTE — PATIENT INSTRUCTIONS
Will schedule a cardioversion at [facility].  This is a shock to the heart done under short acting anesthesia that usually will get the heart back in rhythm.

## 2024-05-16 ENCOUNTER — LAB (OUTPATIENT)
Dept: LAB | Facility: CLINIC | Age: 84
End: 2024-05-16
Payer: MEDICARE

## 2024-05-16 ENCOUNTER — ANTICOAGULATION THERAPY VISIT (OUTPATIENT)
Dept: ANTICOAGULATION | Facility: CLINIC | Age: 84
End: 2024-05-16

## 2024-05-16 DIAGNOSIS — Z95.2 S/P AORTIC VALVE REPLACEMENT: Primary | ICD-10-CM

## 2024-05-16 DIAGNOSIS — Z79.01 LONG TERM CURRENT USE OF ANTICOAGULANT THERAPY: ICD-10-CM

## 2024-05-16 DIAGNOSIS — I48.11 LONGSTANDING PERSISTENT ATRIAL FIBRILLATION (H): ICD-10-CM

## 2024-05-16 DIAGNOSIS — Z95.2 S/P MITRAL VALVE REPLACEMENT: ICD-10-CM

## 2024-05-16 DIAGNOSIS — E11.9 DIABETES MELLITUS, TYPE 2 (H): Primary | ICD-10-CM

## 2024-05-16 DIAGNOSIS — Z95.2 S/P AORTIC VALVE REPLACEMENT: ICD-10-CM

## 2024-05-16 LAB
HBA1C MFR BLD: 6.8 % (ref 0–5.6)
INR BLD: 4.6 (ref 0.9–1.1)

## 2024-05-16 PROCEDURE — 85610 PROTHROMBIN TIME: CPT

## 2024-05-16 PROCEDURE — 83036 HEMOGLOBIN GLYCOSYLATED A1C: CPT

## 2024-05-16 PROCEDURE — 36415 COLL VENOUS BLD VENIPUNCTURE: CPT

## 2024-05-16 NOTE — PROGRESS NOTES
ANTICOAGULATION MANAGEMENT     Fausto Farr 84 year old male is on warfarin with supratherapeutic INR result. (Goal INR 2.5-3.5)    Recent labs: (last 7 days)     05/16/24  0948   INR 4.6*       ASSESSMENT     Source(s): Chart Review and Patient/Caregiver Call     Warfarin doses taken: Warfarin taken as instructed  Diet: No new diet changes identified  Medication/supplement changes: None noted  New illness, injury, or hospitalization: No.  Continues to have back pain which has not improved.  This has decreased patient's mobility.  Signs or symptoms of bleeding or clotting: No  Previous result: Supratherapeutic.  Maintenance dose was lowered 6% on 4/26/24.  Dose was not taken correctly so INR was still elevated on 4/8/24.  Confirmed that patient did take dosing correctly today.    Additional findings: Upcoming surgery/procedure cardioversion scheduled on  5/24/24.        PLAN     Recommended plan for ongoing change(s) affecting INR     Dosing Instructions: decrease your warfarin dose (13% change) with next INR in 1 week       Summary  As of 5/16/2024      Full warfarin instructions:  2.5 mg every Thu; 5 mg all other days   Next INR check:  5/23/2024               Telephone call with Ralph who agrees to plan and repeated back plan correctly    Lab visit scheduled    Education provided:   Please call back if any changes to your diet, medications or how you've been taking warfarin    Plan made per [facility] anticoagulation protocol    Ruthann Sanders RN  Anticoagulation Clinic  5/16/2024    _______________________________________________________________________     Anticoagulation Episode Summary       Current INR goal:  2.5-3.5   TTR:  47.6% (1 y)   Target end date:  Indefinite   Send INR reminders to:  [facility]    Indications    S/P aortic valve replacement [Z95.2]  S/P mitral valve replacement [Z95.2]  Long term current use of anticoagulant therapy [Z79.01]  Longstanding persistent atrial fibrillation (H)  [I48.11]             Comments:  Per 9/20/22 Cardio Note strict INR goal of 2.5-3.5. If INR falls below 2.5 at any time, needs to be bridged with Lovenox. consent to leave DVM for medical information and scheduling             Anticoagulation Care Providers       Provider Role Specialty Phone number    Jodi Flower Mai, MD Referring Internal Medicine - Pediatrics [phone]

## 2024-05-16 NOTE — RESULT ENCOUNTER NOTE
Dear Ralph,    Your lab results are normal.  At this time, I do not recommend any changes to your current plan of care.    Please feel free to call with any questions.  Otherwise, we can discuss further at your next appointment.    Sincerely,    Chris Flower MD

## 2024-05-20 ENCOUNTER — TELEPHONE (OUTPATIENT)
Dept: CARDIOLOGY | Facility: CLINIC | Age: 84
End: 2024-05-20
Payer: MEDICARE

## 2024-05-20 NOTE — TELEPHONE ENCOUNTER
Call out to Pt to discuss needs weekly INR's, messaged scheduling to reschedule cardioversion. MAHSA Rich RN

## 2024-05-20 NOTE — TELEPHONE ENCOUNTER
Reviewed INR's with Pt and is having done this week on 23 rd. Pt INR's good for cardioversion. MAHSA Rich RN

## 2024-05-20 NOTE — TELEPHONE ENCOUNTER
DCCV/RACHAEL prep instructions    Patient is scheduled for a Cardioversion at [facility] - [address], [place] [address] - Main Entrance of the Hospital, on 5/24.  Check in time is at 0630 and procedure to follow.    Patient instructed to remain NPO for solid foods 8 hours prior to arrival and may have clear liquids up to 2 hours prior to arrival for their DCCV.    Patient is taking warfarin/coumadin and should continue. INR's have been greater than 2.0 for the last 3 weeks.    Patient is not diabetic.     Patient is not taking Digoxin.    Patient is taking not on diuretics. and has been advised to hold this the morning of the procedure.    Pt is not on a SGLT2 inhibitor.    Pt is not on a GLP-1 Agonist    Patient advised to take their other daily medications the morning of the procedure with small sips of water.     Verified patient has someone available to drive them home from the hospital and can stay with them for 24 hours after the procedure.     Patient advised to notify care team with any new COVID like symptoms prior to procedure.    Patient advised to notify care team with any new COVID like symptoms prior to procedure. Day of procedure phone number: Brit at [phone]    Patient is aware of visitor policy.    Patient expresses understanding of above instructions and denies further questions at this time.      Odalys Rich RN  [facility]

## 2024-05-21 NOTE — TELEPHONE ENCOUNTER
[facility] Refill Guideline reviewed.  Medication meets criteria for refill.      Body Location Override (Optional - Billing Will Still Be Based On Selected Body Map Location If Applicable): left central frontal scalp Detail Level: Detailed Add 1585x Cpt? (Do Not Bill If You Billed For The Procedure Placing The Sutures. This Is An Add-On Code That Must Be Billed With An E/M Visit Code): No Suture Removal Completed By (Optional): benson

## 2024-05-23 ENCOUNTER — TELEPHONE (OUTPATIENT)
Dept: PEDIATRICS | Facility: CLINIC | Age: 84
End: 2024-05-23

## 2024-05-23 ENCOUNTER — ANTICOAGULATION THERAPY VISIT (OUTPATIENT)
Dept: ANTICOAGULATION | Facility: CLINIC | Age: 84
End: 2024-05-23

## 2024-05-23 ENCOUNTER — LAB (OUTPATIENT)
Dept: LAB | Facility: CLINIC | Age: 84
End: 2024-05-23
Payer: MEDICARE

## 2024-05-23 DIAGNOSIS — Z95.2 S/P AORTIC VALVE REPLACEMENT: Primary | ICD-10-CM

## 2024-05-23 DIAGNOSIS — Z95.2 S/P MITRAL VALVE REPLACEMENT: ICD-10-CM

## 2024-05-23 DIAGNOSIS — Z79.01 LONG TERM CURRENT USE OF ANTICOAGULANT THERAPY: ICD-10-CM

## 2024-05-23 DIAGNOSIS — Z95.2 S/P AORTIC VALVE REPLACEMENT: ICD-10-CM

## 2024-05-23 DIAGNOSIS — I48.11 LONGSTANDING PERSISTENT ATRIAL FIBRILLATION (H): ICD-10-CM

## 2024-05-23 LAB — INR BLD: 2.5 (ref 0.9–1.1)

## 2024-05-23 PROCEDURE — 85610 PROTHROMBIN TIME: CPT

## 2024-05-23 PROCEDURE — 36416 COLLJ CAPILLARY BLOOD SPEC: CPT

## 2024-05-23 NOTE — PROGRESS NOTES
ANTICOAGULATION MANAGEMENT     Fausto Farr 84 year old male is on warfarin with therapeutic INR result. (Goal INR 2.5-3.5)    Recent labs: (last 7 days)     05/23/24  0950   INR 2.5*       ASSESSMENT     Source(s): Chart Review and Patient/Caregiver Call     Warfarin doses taken: Warfarin taken as instructed  Diet: No new diet changes identified  Medication/supplement changes: None noted  New illness, injury, or hospitalization: No  Signs or symptoms of bleeding or clotting: No  Previous result: Supratherapeutic  Additional findings: Upcoming Cardioversion; weekly INR monitoring. To notify EP pool and cardiologist if INR < 2 if scheduled, INR > 3.3 within a week of ablation/PVI, or non-compliance with monitoring > 1 week. Confirmed that he IS getting cardioversion tomorrow  Does not like the new dosing schedule. Feels like he is going to keep dropping. I explained that his INR looks really good on this current 7 day total. He advises me that he is going to take 5 mg tonight because he is on the low end of his goal range despite [facility] recommendation.        PLAN     Recommended plan for no diet, medication or health factor changes affecting INR     Dosing Instructions: Continue your current warfarin dose with next INR in 1 week       Summary  As of 5/23/2024      Full warfarin instructions:  5/23: 5 mg; Otherwise 2.5 mg every Thu; 5 mg all other days   Next INR check:  5/31/2024               Telephone call with Ralph who verbalizes understanding and agrees to plan    Lab visit scheduled    Education provided:   Please call back if any changes to your diet, medications or how you've been taking warfarin    Plan made per [facility] anticoagulation protocol    Jame Chadwick RN  Anticoagulation Clinic  5/23/2024    _______________________________________________________________________     Anticoagulation Episode Summary       Current INR goal:  2.5-3.5   TTR:  47.5% (1 y)   Target end date:  Indefinite   Send INR  reminders to:  [facility]    Indications    S/P aortic valve replacement [Z95.2]  S/P mitral valve replacement [Z95.2]  Long term current use of anticoagulant therapy [Z79.01]  Longstanding persistent atrial fibrillation (H) [I48.11]             Comments:  Per 9/20/22 Cardio Note strict INR goal of 2.5-3.5. If INR falls below 2.5 at any time, needs to be bridged with Lovenox. consent to leave DVM              Anticoagulation Care Providers       Provider Role Specialty Phone number    Jodi Flower Mai, MD Referring Internal Medicine - Pediatrics [phone]

## 2024-05-23 NOTE — TELEPHONE ENCOUNTER
Patient Returning Call    Reason for call:  Ralph is returning a call from [facility] from [facility] and would like to get a call back    Information relayed to patient:  the INR team will give you a call back when they receive this message    Patient has additional questions:  No      Could we send this information to you in [facility] or would you prefer to receive a phone call?:   Patient would prefer a phone call   Okay to leave a detailed message?: Yes at Home number on file [phone] (home)

## 2024-05-23 NOTE — PROGRESS NOTES
ANTICOAGULATION MANAGEMENT     Fausto Farr 84 year old male is on warfarin with therapeutic INR result. (Goal INR 2.5-3.5)    Recent labs: (last 7 days)     05/23/24  0950   INR 2.5*       ASSESSMENT     Source(s): Chart Review  Previous INR was Supratherapeutic  Supposed to have cardioversion tomorrow, though in Epic states it was canceled. Not clear per chart review if they rescheduled or if in fact canceled and need to clarify         PLAN     Unable to reach Ralph today.    Left message to continue current dose of warfarin 2.5 mg tonight. Request call back for assessment.    Follow up required to assess for changes     Jame Chadwick RN  Anticoagulation Clinic  5/23/2024

## 2024-05-24 ENCOUNTER — DOCUMENTATION ONLY (OUTPATIENT)
Dept: CARDIOLOGY | Facility: CLINIC | Age: 84
End: 2024-05-24

## 2024-05-24 ENCOUNTER — DOCUMENTATION ONLY (OUTPATIENT)
Dept: ANTICOAGULATION | Facility: CLINIC | Age: 84
End: 2024-05-24

## 2024-05-24 ENCOUNTER — HOSPITAL ENCOUNTER (OUTPATIENT)
Facility: CLINIC | Age: 84
Discharge: HOME | End: 2024-05-24
Admitting: INTERNAL MEDICINE
Payer: MEDICARE

## 2024-05-24 ENCOUNTER — ANESTHESIA EVENT (OUTPATIENT)
Dept: MEDSURG UNIT | Facility: CLINIC | Age: 84
End: 2024-05-24
Payer: MEDICARE

## 2024-05-24 ENCOUNTER — ANESTHESIA (OUTPATIENT)
Dept: MEDSURG UNIT | Facility: CLINIC | Age: 84
End: 2024-05-24
Payer: MEDICARE

## 2024-05-24 ENCOUNTER — HOSPITAL ENCOUNTER (OUTPATIENT)
Dept: MEDSURG UNIT | Facility: CLINIC | Age: 84
Discharge: HOME | End: 2024-05-24
Attending: INTERNAL MEDICINE | Admitting: INTERNAL MEDICINE
Payer: MEDICARE

## 2024-05-24 VITALS
OXYGEN SATURATION: 96 % | HEIGHT: 65 IN | WEIGHT: 207.45 LBS | BODY MASS INDEX: 34.56 KG/M2 | DIASTOLIC BLOOD PRESSURE: 88 MMHG | TEMPERATURE: 97.6 F | RESPIRATION RATE: 20 BRPM | SYSTOLIC BLOOD PRESSURE: 151 MMHG | HEART RATE: 83 BPM

## 2024-05-24 DIAGNOSIS — Z95.2 S/P AORTIC VALVE REPLACEMENT: Primary | ICD-10-CM

## 2024-05-24 DIAGNOSIS — I48.11 LONGSTANDING PERSISTENT ATRIAL FIBRILLATION (H): ICD-10-CM

## 2024-05-24 DIAGNOSIS — Z95.2 S/P MITRAL VALVE REPLACEMENT: ICD-10-CM

## 2024-05-24 DIAGNOSIS — I48.4 ATYPICAL ATRIAL FLUTTER (H): ICD-10-CM

## 2024-05-24 DIAGNOSIS — Z79.01 LONG TERM CURRENT USE OF ANTICOAGULANT THERAPY: ICD-10-CM

## 2024-05-24 LAB
HOLD SPECIMEN: NORMAL
HOLD SPECIMEN: NORMAL
INR BLD: 2.7 (ref 0.9–1.1)
MAGNESIUM SERPL-MCNC: 1.7 MG/DL (ref 1.7–2.3)
POTASSIUM SERPL-SCNC: 3.9 MMOL/L (ref 3.4–5.3)

## 2024-05-24 PROCEDURE — 99100 ANES PT EXTEME AGE<1 YR&>70: CPT

## 2024-05-24 PROCEDURE — 84132 ASSAY OF SERUM POTASSIUM: CPT

## 2024-05-24 PROCEDURE — 92960 CARDIOVERSION ELECTRIC EXT: CPT | Performed by: STUDENT IN AN ORGANIZED HEALTH CARE EDUCATION/TRAINING PROGRAM

## 2024-05-24 PROCEDURE — 36415 COLL VENOUS BLD VENIPUNCTURE: CPT

## 2024-05-24 PROCEDURE — 999N000054 HC STATISTIC EKG NON-CHARGEABLE

## 2024-05-24 PROCEDURE — 85610 PROTHROMBIN TIME: CPT

## 2024-05-24 PROCEDURE — 999N000011 HC STATISTIC ANESTHESIA CASE

## 2024-05-24 PROCEDURE — 83735 ASSAY OF MAGNESIUM: CPT

## 2024-05-24 PROCEDURE — 258N000003 HC RX IP 258 OP 636: Mod: JZ

## 2024-05-24 PROCEDURE — 250N000013 HC RX MED GY IP 250 OP 250 PS 637

## 2024-05-24 PROCEDURE — 250N000011 HC RX IP 250 OP 636: Mod: JZ

## 2024-05-24 PROCEDURE — 92960 CARDIOVERSION ELECTRIC EXT: CPT

## 2024-05-24 PROCEDURE — 93010 ELECTROCARDIOGRAM REPORT: CPT | Performed by: INTERNAL MEDICINE

## 2024-05-24 PROCEDURE — 250N000011 HC RX IP 250 OP 636: Performed by: STUDENT IN AN ORGANIZED HEALTH CARE EDUCATION/TRAINING PROGRAM

## 2024-05-24 PROCEDURE — 93005 ELECTROCARDIOGRAM TRACING: CPT

## 2024-05-24 RX ORDER — POTASSIUM CHLORIDE 1500 MG/1
20 TABLET, EXTENDED RELEASE ORAL
Status: DISCONTINUED | OUTPATIENT
Start: 2024-05-24 | End: 2024-05-24 | Stop reason: HOSPADM

## 2024-05-24 RX ORDER — ATROPINE SULFATE 0.1 MG/ML
.5-1 INJECTION INTRAVENOUS
Status: DISCONTINUED | OUTPATIENT
Start: 2024-05-24 | End: 2024-05-24 | Stop reason: HOSPADM

## 2024-05-24 RX ORDER — NALOXONE HYDROCHLORIDE 0.4 MG/ML
0.2 INJECTION, SOLUTION INTRAMUSCULAR; INTRAVENOUS; SUBCUTANEOUS
Status: DISCONTINUED | OUTPATIENT
Start: 2024-05-24 | End: 2024-05-24 | Stop reason: HOSPADM

## 2024-05-24 RX ORDER — NALOXONE HYDROCHLORIDE 0.4 MG/ML
0.4 INJECTION, SOLUTION INTRAMUSCULAR; INTRAVENOUS; SUBCUTANEOUS
Status: DISCONTINUED | OUTPATIENT
Start: 2024-05-24 | End: 2024-05-24 | Stop reason: HOSPADM

## 2024-05-24 RX ORDER — MAGNESIUM SULFATE HEPTAHYDRATE 40 MG/ML
2 INJECTION, SOLUTION INTRAVENOUS
Status: DISCONTINUED | OUTPATIENT
Start: 2024-05-24 | End: 2024-05-24 | Stop reason: HOSPADM

## 2024-05-24 RX ORDER — SODIUM CHLORIDE 9 MG/ML
INJECTION, SOLUTION INTRAVENOUS CONTINUOUS
Status: DISCONTINUED | OUTPATIENT
Start: 2024-05-24 | End: 2024-05-24 | Stop reason: HOSPADM

## 2024-05-24 RX ORDER — FLUMAZENIL 0.1 MG/ML
0.2 INJECTION, SOLUTION INTRAVENOUS
Status: DISCONTINUED | OUTPATIENT
Start: 2024-05-24 | End: 2024-05-24 | Stop reason: HOSPADM

## 2024-05-24 RX ORDER — POTASSIUM CHLORIDE 1500 MG/1
40 TABLET, EXTENDED RELEASE ORAL
Status: DISCONTINUED | OUTPATIENT
Start: 2024-05-24 | End: 2024-05-24 | Stop reason: HOSPADM

## 2024-05-24 RX ORDER — PROPOFOL 10 MG/ML
INJECTION, EMULSION INTRAVENOUS PRN
Status: DISCONTINUED | OUTPATIENT
Start: 2024-05-24 | End: 2024-05-24

## 2024-05-24 RX ADMIN — MAGNESIUM SULFATE HEPTAHYDRATE 2 G: 40 INJECTION, SOLUTION INTRAVENOUS at 08:18

## 2024-05-24 RX ADMIN — POTASSIUM CHLORIDE 20 MEQ: 1500 TABLET, EXTENDED RELEASE ORAL at 08:19

## 2024-05-24 RX ADMIN — SODIUM CHLORIDE: 9 INJECTION, SOLUTION INTRAVENOUS at 08:37

## 2024-05-24 RX ADMIN — PROPOFOL 50 MG: 10 INJECTION, EMULSION INTRAVENOUS at 08:50

## 2024-05-24 ASSESSMENT — ACTIVITIES OF DAILY LIVING (ADL)
ADLS_ACUITY_SCORE: 38

## 2024-05-24 ASSESSMENT — ENCOUNTER SYMPTOMS: DYSRHYTHMIAS: 1

## 2024-05-24 NOTE — ANESTHESIA PREPROCEDURE EVALUATION
Anesthesia Pre-Procedure Evaluation    Patient: Fausto Farr   MRN: [id number] : 1940        Procedure : * No procedures listed *  Cardioversion External       Past Medical History:   Diagnosis Date    Abdominal pain     Anemia     currently taking iron    Back pain     since     BPH (benign prostatic hyperplasia)     Bruit     CAD (coronary artery disease)     Cellulitis 10/18/2012    Cellulitis 2018    GrpB strep LLE cellulitis  negative RACHAEL for veg    Chronic venous insufficiency     bilat lower extremities    Contact dermatitis and other eczema, due to unspecified cause     Diaphragmatic hernia without mention of obstruction or gangrene     Diastolic HF (heart failure) (H)     Gastric ulcer     Hypertension 2013    Lumbago     Mesenteric artery insufficiency (H24)     known AAA and narrowing of intestinal artery's  followed by dr raymond    Metabolic syndrome     Mitral valve disease     s/p mechanical MVR, [facility]    Nonallopathic lesion of lumbar region     Nonallopathic lesion of rib cage     Nonallopathic lesion of sacral region     GAGE (obstructive sleep apnea)     CPAP    Paroxysmal atrial fibrillation (H) 10/18/2012    occured after stopping BB  (prior  aflutter ablation)    Prostate cancer (H)     radiation seed, XRT     PVD (peripheral vascular disease) (H24)     Rotator cuff strain     and sprain    S/P AAA repair     S/P aortic valve replacement     developed perivalve leak and MS, therefore redo surg     S/P CABG (coronary artery bypass graft)     Lima-Lad, RA-Rca    Sciatica of left side     since     Sepsis due to group B Streptococcus (H) 2018    TIA (transient ischemic attack) 2022    Total knee replacement status 2019    Ulcerative colitis (H)     Varicose veins of bilateral lower extremities with other complications     s/p RLE vein stripping    Vitamin D deficiency       Past Surgical History:   Procedure Laterality Date     AORTIC VALVE REPLACEMENT  1/3/06    redo AVR SJM 21mm and SJM MVR 27mm in 2013SJM 21([id number]):AVR, SJM 27 :MVR-    APPLY WOUND VAC N/A 11/12/2019    Procedure: APPLICATION, WOUND VAC;  Surgeon: Sara Martinez MD;  Location:  OR    ARTHROPLASTY KNEE      right knee    ARTHROPLASTY KNEE Right 7/22/2019    Procedure: Right total knee arthroplasty;  Surgeon: Prasanth Jensen MD;  Location: [facility] OR    BACK SURGERY  Oct 2015    Fusion L4-5, laminectomy L2, L3    BYPASS GRAFT ARTERY CORONARY  10/2013    reimplantation of radial artery graft to RCA    CARDIAC CATHERIZATION  11/2005    Stent placed to RCA    CARDIAC CATHERIZATION  04/2013    Occluded RCA, patent LIMA to LAD and radial graft to PDA    CARPAL TUNNEL RELEASE RT/LT  1994    COLONOSCOPY  8-22-11    CYSTOSCOPY FLEXIBLE  10/16/2013    Procedure: CYSTOSCOPY FLEXIBLE;  FLEXIBLE CYSTOSCOPY / DILATION OF URETHRA / INSERTION OF LESLIE;  Surgeon: Cooper Wallace MD;  Location:  OR    ENDOVASCULAR REPAIR, INFRARENAL ABDOMINAL AORTIC ANEURYSM/DISSECTION; MODULAR BIFURCATED PROSTHESIS  2006    AAA repair endovascular    ENT SURGERY      EP ABLATION ATRIAL FLUTTER N/A 4/22/2019    Procedure: EP Ablation Atrial Flutter;  Surgeon: Jessy Vasquez MD;  Location:  HEART CARDIAC CATH LAB    EP PACEMAKER DEVICE & LEAD IMPLANT- RIGHT ATRIAL & RIGHT VENTRICULAR N/A 8/2/2022    Procedure: Pacemaker Device & Lead Implant - Right Atrial & Right Ventricular;  Surgeon: Jessy Vasquez MD;  Location:  HEART CARDIAC CATH LAB    GENITOURINARY SURGERY  6/16/08    radioactive seeding    GRAFT FLAP PEDICLE EXTREMITY (LOCATION) Right 11/12/2019    Procedure: SURGICAL PROCUREMENT, FLAP, PEDICLE, EXTREMITY;  Surgeon: Sara Martinez MD;  Location: [facility] OR    GRAFT SKIN SPLIT THICKNESS FROM EXTREMITY Right 11/12/2019    Procedure: RIGHT GASTRONEMIUS FLAP TO RIGHT KNEE, SPLIT THICKNESS SKIN GRAFT FROM RIGHT THIGH TO RIGHT KNEE, SURGICAL  PROCUREMENT, FLAP, PEDICLE, EXTREMITY, WOUND VAC PLACEMENT;  Surgeon: Sara Martinez MD;  Location:  OR    HEAD & NECK SURGERY  1997    vocal cord polypectomy    INCISION AND DRAINAGE KNEE, COMBINED Right 8/29/2019    Procedure: INCISION AND DRAINAGE, KNEE W/ Secondary Wound Closure;  Surgeon: Prasanth Jensen MD;  Location:  OR    IRRIGATION AND DEBRIDEMENT KNEE, PLACE ANTIBIOTIC CEMENT BEADS / SPACE Right 2/12/2020    Procedure: 1. Irrigation and debridement of wound breakdown, right total knee arthroplasty.  2. Irrigation and debridement of right total knee with placement of antibiotic-impregnated (vancomycin) absorbable antibiotic beads.;  Surgeon: Prasanth Jensen MD;  Location: [facility] OR    IRRIGATION AND DEBRIDEMENT LOWER EXTREMITY, COMBINED Right 12/8/2019    Procedure: DEBRIDEMENT OF RIGHT CALF AND WOUND CLOSURE.;  Surgeon: Sara Martinez MD;  Location:  OR    IRRIGATION AND DEBRIDEMENT UPPER EXTREMITY, COMBINED Right 1/7/2023    Procedure: Right elbow arthrotomy, irrigation and debridement;  Surgeon: Jose A Christine MD;  Location:  OR    KNEE SURGERY  2001 Right knee arthroscopy    OPTICAL TRACKING SYSTEM FUSION SPINE POSTERIOR LUMBAR THREE+ LEVELS N/A 10/29/2015    Procedure: OPTICAL TRACKING SYSTEM FUSION SPINE POSTERIOR LUMBAR THREE+ LEVELS;  Surgeon: Walt Garcia MD;  Location:  OR    PROSTATE SURGERY      radioactive seeding 6/16/08    REPAIR ANEURYSM ABDOMINAL AORTA  06/08    REPAIR VALVE MITRAL  10/16/2013    SJ 21([id number] [id number]):AVR, [facility] 27 :MVRProcedure: REPAIR VALVE MITRAL;  REDO STERNOTOMY/REDO AORTIC VALVE REPLACEMENT/ MITRAL VALVE REPLACEMENT/REIMPLANTATION OF RIGHT CORONARY ARTERY BYPASS WITH RACHAEL ( ON PUMP);  Surgeon: Viet Singh MD;  Location:  OR    REPLACE VALVE AORTIC  10/16/2013    Procedure: REPLACE VALVE AORTIC;;  Surgeon: Viet Singh MD;  Location:  OR    SURGERY GENERAL IP CONSULT  05/2008 Excision aneurysm abdominal  aorta    SURGERY GENERAL IP CONSULT   Vocal cord polypectomy    VASCULAR SURGERY  ,      varicose vein stripping    ZZC CABG, VEIN, TWO  1/3/06    Left radial to RCA, LIMA to LAD (RA to RCA reimplanted at time of 2013 surg)      Allergies   Allergen Reactions    Bees Anaphylaxis      Social History     Tobacco Use    Smoking status: Former     Current packs/day: 0.00     Average packs/day: 1 pack/day for 40.5 years (40.5 ttl pk-yrs)     Types: Cigarettes     Start date: 4/15/1962     Quit date: 10/23/2002     Years since quittin.6    Smokeless tobacco: Never   Substance Use Topics    Alcohol use: Yes     Comment: a couple beers per week (socially)      Wt Readings from Last 1 Encounters:   24 94.1 kg (207 lb 7.3 oz)        Anesthesia Evaluation   Pt has had prior anesthetic.     No history of anesthetic complications       ROS/MED HX  ENT/Pulmonary:     (+) sleep apnea,                                       Neurologic:     (+)          CVA,    TIA,                  Cardiovascular:     (+) Dyslipidemia hypertension- -  CAD -  CABG- -      CHF        pacemaker,          dysrhythmias, a-fib,  valvular problems/murmurs  s/p AVR MVR.    Previous cardiac testing (2022)   Echo: Date: Results:  There is moderate concentric left ventricular hypertrophy.  Left ventricular systolic function is normal.  The visual ejection fraction is 55-60%.  The right ventricle is borderline dilated.  The right ventricular systolic function is normal.  Mild (35-45mmHg) pulmonary hypertension is present.  Mechanical steven valve prosthesis (21 mm St Solo). Well seated. Mean gradient  6 mmHg at HR 71 bpm. MR not appreciated due to acoustic shadowing.  Mechanical aortic valve prosthesis (27 mm St. Solo). Well seated with no  valvular or perivalvular AI. Mean gradient of 11 mmHg. Vmax 2.5 m/sec. AT <  100 msec. Normal Doppler interrogation for this valve.  The inferior vena cava was normal in size with preserved  respiratory  variability.  There is no pericardial effusion.     Findings similar to study dated 7/2/2020. Gradients across both mechanical  prostheses are slightly increased compared to prior however still within  normal limits for these valve types.    Stress Test:  Date: Results:    ECG Reviewed:  Date: Results:    Cath:  Date: Results:      METS/Exercise Tolerance: 3 - Able to walk 1-2 blocks without stopping    Hematologic:       Musculoskeletal:       GI/Hepatic: Comment: Ulcerative colitis   (-) GERD   Renal/Genitourinary:       Endo:     (+)  type II DM,             Obesity,       Psychiatric/Substance Use:       Infectious Disease:       Malignancy:       Other:            Physical Exam    Airway        Mallampati: II   TM distance: > 3 FB   Neck ROM: full   Mouth opening: > 3 cm    Respiratory Devices and Support         Dental       (+) Modest Abnormalities - crowns, retainers, 1 or 2 missing teeth      Cardiovascular          Rhythm and rate: irregular and normal     Pulmonary   pulmonary exam normal                OUTSIDE LABS:  CBC:   Lab Results   Component Value Date    WBC 7.2 02/16/2024    WBC 8.8 01/17/2023    HGB 12.8 (L) 02/16/2024    HGB 13.2 (L) 01/17/2023    HCT 41.0 02/16/2024    HCT 41.6 01/17/2023     02/16/2024     01/17/2023     BMP:   Lab Results   Component Value Date     02/16/2024     08/01/2023    POTASSIUM 3.9 05/24/2024    POTASSIUM 4.2 02/16/2024    CHLORIDE 103 02/16/2024    CHLORIDE 104 08/01/2023    CO2 26 02/16/2024    CO2 26 08/01/2023    BUN 19.6 02/16/2024    BUN 17.0 08/01/2023    CR 1.01 02/16/2024    CR 0.94 08/01/2023     (H) 02/16/2024     (H) 08/01/2023     COAGS:   Lab Results   Component Value Date    PTT 37 10/16/2013    INR 2.7 (H) 05/24/2024    FIBR 229 10/16/2013     POC:   Lab Results   Component Value Date    BGM 92 12/10/2019     HEPATIC:   Lab Results   Component Value Date    ALBUMIN 4.4 08/01/2023    PROTTOTAL 7.3  "08/01/2023    ALT 28 08/01/2023    AST 33 08/01/2023    ALKPHOS 57 08/01/2023    BILITOTAL 0.2 08/01/2023     OTHER:   Lab Results   Component Value Date    PH 7.34 (L) 10/17/2013    LACT 1.0 01/07/2023    A1C 6.8 (H) 05/16/2024    AWILDA 9.3 02/16/2024    PHOS 3.9 11/13/2019    MAG 1.7 05/24/2024    TSH 1.94 10/12/2023    T4 0.79 11/21/2005    CRP 5.5 06/15/2020    SED 30 (H) 01/06/2023       Anesthesia Plan    ASA Status:  3    NPO Status:  NPO Appropriate    Anesthesia Type: MAC.     - Reason for MAC: chronic cardiopulmonary disease              Consents    Anesthesia Plan(s) and associated risks, benefits, and realistic alternatives discussed. Questions answered and patient/representative(s) expressed understanding.     - Discussed:     - Discussed with:  Patient            Postoperative Care    Pain management: IV analgesics.   PONV prophylaxis: Ondansetron (or other 5HT-3)     Comments:               Efren Pedroza MD    I have reviewed the pertinent notes and labs in the chart from the past 30 days and (re)examined the patient.  Any updates or changes from those notes are reflected in this note.            # Drug Induced Coagulation Defect: home medication list includes an anticoagulant medication   # DMII: A1C = 6.8 % (Ref range: 0.0 - 5.6 %) within past 6 months  # Obesity: Estimated body mass index is 34.52 kg/m  as calculated from the following:    Height as of an earlier encounter on 5/24/24: 1.651 m (5' 5\").    Weight as of an earlier encounter on 5/24/24: 94.1 kg (207 lb 7.3 oz).      "

## 2024-05-24 NOTE — PROGRESS NOTES
Care Suites Admission Nursing Note    Patient Information  Name: Fausto Farr  Age: 84 year old  Reason for admission: [facility]  Care Suites arrival time: 0630    Visitor Information  Name: family     Patient Admission/Assessment   Pre-procedure assessment complete: Yes  If abnormal assessment/labs, provider notified: N/A  NPO: Yes  Medications held per instructions/orders: N/A  Consent: deferred  If applicable, pregnancy test status: deferred  Patient oriented to room: Yes  Education/questions answered: Yes, discharge instructions reviewed with pt and family, questions answered and all state understanding.  Plan/other:     Discharge Planning  Discharge name/phone number: beata Olivia [phone]  Overnight post sedation caregiver: family  Discharge location: home    Shahana Riggs RN

## 2024-05-24 NOTE — ANESTHESIA CARE TRANSFER NOTE
Patient: Fausto Carson Yordan    Procedure: * No procedures listed *  Cardioversion External    Diagnosis: * No pre-op diagnosis entered *  Diagnosis Additional Information: No value filed.    Anesthesia Type:   No value filed.     Note:    Oropharynx: oropharynx clear of all foreign objects  Level of Consciousness: awake  Oxygen Supplementation: nasal cannula    Independent Airway: airway patency satisfactory and stable  Dentition: dentition unchanged  Vital Signs Stable: post-procedure vital signs reviewed and stable  Report to RN Given: handoff report given  Patient transferred to: Cardiac Special Care          Vitals:  Vitals Value Taken Time   BP     Temp     Pulse     Resp     SpO2         Electronically Signed By: Efren Pedroza MD  May 24, 2024  9:15 AM

## 2024-05-24 NOTE — ANESTHESIA POSTPROCEDURE EVALUATION
Patient: Fausto Carson [facility]    Procedure: * No procedures listed *  Cardioversion External    Anesthesia Type:  No value filed.    Note:  Disposition: Outpatient   Postop Pain Control: Uneventful            Sign Out: Well controlled pain   PONV: No   Neuro/Psych: Uneventful            Sign Out: Acceptable/Baseline neuro status   Airway/Respiratory: Uneventful            Sign Out: Acceptable/Baseline resp. status   CV/Hemodynamics: Uneventful            Sign Out: Acceptable CV status   Other NRE: NONE   DID A NON-ROUTINE EVENT OCCUR?            Last vitals:  There were no vitals filed for this visit.    Electronically Signed By: Efren Pedroza MD  May 24, 2024  9:14 AM

## 2024-05-24 NOTE — DISCHARGE INSTRUCTIONS
Cardioversion Discharge Instructions    After you go home:       For 24 hours - due to the sedation you received:    Have an adult stay with you for 24 hours.   Relax and take it easy.  Do NOT make any important or legal decisions.  Do NOT drive or operate machines at home or at work.  Do NOT drink alcohol.    Diet:    Start with clear liquids and progress to your normal diet as you feel able.    Medicines:    Take your medications, including blood thinners, unless your provider tells you not to.  If you have stopped any medications, check with your provider about when to restart them.    Follow Up Appointments:    Follow up with your cardiologist at [facility] of patient preference as instructed.  Follow up with your primary care provider as needed.    Post cardioversion:    The skin on your chest or back may feel tender for 48 hours.  If your skin is tender, you may:    Use a cold pack on the site. Never use ice directly on your skin. Use the cold pack for 20 minutes. Remove it for at least 30 minutes before re-using.  Apply 1% hydrocortisone cream to the skin (sold at drug stores)  Take Advil (Ibuprofen) or Tylenol (Acetaminophen) per your provider's recommendations.      Call your provider if you have:    Weakness, dizziness, lightheadedness, or fainting.  Shortness of breath.  Irregular heartbeat, feelings of your heart fluttering or beating fast, hard or palpitations.   More than minor skin discomfort or redness where the cardioversion pads were placed.  Questions or concerns.      Call 911 if you have:    Pain in your chest, arm, shoulder, neck, or upper back.  You have problems speaking or seeing.  Weakness in your arm or leg.  You are unable to move your arm or leg.  You have uncontrolled bleeding.         [facility] [place]:    [phone] [facility] (7 days a week)

## 2024-05-24 NOTE — PROGRESS NOTES
ANTICOAGULATION  MANAGEMENT: Discharge Review    Fausto Carson [facility] chart reviewed for anticoagulation continuity of care    Outpatient surgery/procedure on 5/24/24 for cardioversion.    Discharge disposition: Home    Results:    Recent labs: (last 7 days)     05/23/24  0950 05/24/24  0721   INR 2.5* 2.7*     Anticoagulation inpatient management:     not applicable     Anticoagulation discharge instructions:     Warfarin dosing: home regimen continued   Bridging: No   INR goal change: No      Medication changes affecting anticoagulation: As stated in yesterday's [facility] visit, Ralph took booster dose of warfarin yesterday despite therapeutic INR    Additional factors affecting anticoagulation: Yes: cardioversion     PLAN     No adjustment to anticoagulation plan needed    Recommended follow up is scheduled  Patient not contacted - cardioversion was planned and addressed at 5/23 [facility] visit    No adjustment to Anticoagulation Calendar was required    Maria Luz Rios RN

## 2024-05-24 NOTE — PRE-PROCEDURE
GENERAL PRE-PROCEDURE:   Procedure:  Cardioversion  Date/Time:  5/24/2024 8:36 AM    Verbal consent obtained?: Yes    Written consent obtained?: Yes    Consent given by:  Patient  Patient states understanding of procedure being performed: Yes    Patient's understanding of procedure matches consent: Yes    Procedure consent matches procedure scheduled: Yes    Appropriately NPO:  Yes  ASA Class:  2  Mallampati  :  Grade 2- soft palate, base of uvula, tonsillar pillars, and portion of posterior pharyngeal wall visible  Lungs:  Lungs clear with good breath sounds bilaterally  Heart:  A-flutter  History & Physical reviewed:  History and physical reviewed and no updates needed

## 2024-05-24 NOTE — ANESTHESIA CARE TRANSFER NOTE
Patient: Fausto Alli Yordan    Procedure: * No procedures listed *  Cardioversion External    Diagnosis: * No pre-op diagnosis entered *  Diagnosis Additional Information: No value filed.    Anesthesia Type:   MAC     Note:    Oropharynx: oropharynx clear of all foreign objects and spontaneously breathing  Level of Consciousness: awake  Oxygen Supplementation: face mask  Level of Supplemental Oxygen (L/min / FiO2): 6  Independent Airway: airway patency satisfactory and stable  Dentition: dentition unchanged  Vital Signs Stable: post-procedure vital signs reviewed and stable  Report to RN Given: handoff report given  Destination: care suites.          Vitals:  Vitals Value Taken Time   /79    Temp 37    Pulse 80    Resp 16    SpO2 100 %        Electronically Signed By: ADELE Hall CRNA  May 24, 2024  9:22 AM

## 2024-05-24 NOTE — PROGRESS NOTES
Care Suites Procedure Nursing Note    Patient Information  Name: Fausto Farr  Age: 84 year old    Procedure  Procedure: DCCV  Procedure start time: 0845  Procedure complete time: 0900  Concerns/abnormal assessment: no  If abnormal assessment, provider notified: N/A  Plan/Other: After consent obtained and time out done a DCCV performed at 150J x1. Pt converted to SR with paced rhythmn. Sedation per anesthesia, VSS, pt tolerated well.    Shahana Riggs RN

## 2024-05-24 NOTE — PROCEDURES
CARDIOVERSION PROCEDURE NOTE    INDICATION: persistent atrial flutter    REFERRING PROVIDER: Dr. Amaro    PROCEDURE: The risks and benefits of the cardioversion procedure were explained to patient (and/or family) in detail including but not limited to minor or serious life threatening arrhythmias, heart pauses/ block, death, burns, respiratory failure, sedation related or anesthetic complications, need for CPR, temporary or permanent pacemaker implantation etc. Patient and/family understand it and wish to proceed. Patients INR has been at goal weekly over the past four weeks.      After confirming NPO status, time out and patient verification was performed. DC cardioversion pads were applied to the patient's chest in usual fashion after as necessary chest preparation. Deep sedation was performed by the anesthesia team. See their procedural note for details.     A single synchronized shock of 150 joules was delivered. Spontaneous recovery to normal sinus rhythm was noted. Patient tolerated the procedure well without any immediate complications.     IMPRESSION: Successful cardioversion to normal sinus rhythm.Confirmed by Pacemaker interrogation.  Recovery under the care of anesthesia team. Follow up with referring MD.      Xuan Gusman MD [facility]

## 2024-05-24 NOTE — PROGRESS NOTES
Care Suites Discharge Nursing Note    Patient Information  Name: Fausto Farr  Age: 84 year old    Discharge Education:  Discharge instructions reviewed: Yes  Additional education/resources provided: no  Patient/patient representative verbalizes understanding: Yes  Patient discharging on new medications: No  Medication education completed: N/A    Discharge Plans:   Discharge location: home  Discharge ride contacted: Yes  Approximate discharge time: 1000    Discharge Criteria:  Discharge criteria met and vital signs stable: Yes    Patient Belongs:  Patient belongings returned to patient: Yes    Shahana Riggs RN

## 2024-05-26 LAB
ATRIAL RATE - MUSE: 312 BPM
DIASTOLIC BLOOD PRESSURE - MUSE: NORMAL MMHG
INTERPRETATION ECG - MUSE: NORMAL
P AXIS - MUSE: NORMAL DEGREES
PR INTERVAL - MUSE: NORMAL MS
QRS DURATION - MUSE: 168 MS
QT - MUSE: 490 MS
QTC - MUSE: 529 MS
R AXIS - MUSE: -56 DEGREES
SYSTOLIC BLOOD PRESSURE - MUSE: NORMAL MMHG
T AXIS - MUSE: 53 DEGREES
VENTRICULAR RATE- MUSE: 70 BPM

## 2024-05-29 ENCOUNTER — TRANSFERRED RECORDS (OUTPATIENT)
Dept: HEALTH INFORMATION MANAGEMENT | Facility: CLINIC | Age: 84
End: 2024-05-29
Payer: MEDICARE

## 2024-05-31 ENCOUNTER — LAB (OUTPATIENT)
Dept: LAB | Facility: CLINIC | Age: 84
End: 2024-05-31
Payer: MEDICARE

## 2024-05-31 ENCOUNTER — ANTICOAGULATION THERAPY VISIT (OUTPATIENT)
Dept: ANTICOAGULATION | Facility: CLINIC | Age: 84
End: 2024-05-31

## 2024-05-31 DIAGNOSIS — Z95.2 S/P AORTIC VALVE REPLACEMENT: Primary | ICD-10-CM

## 2024-05-31 DIAGNOSIS — Z95.2 S/P MITRAL VALVE REPLACEMENT: ICD-10-CM

## 2024-05-31 DIAGNOSIS — Z79.01 LONG TERM CURRENT USE OF ANTICOAGULANT THERAPY: ICD-10-CM

## 2024-05-31 DIAGNOSIS — Z95.2 S/P AORTIC VALVE REPLACEMENT: ICD-10-CM

## 2024-05-31 DIAGNOSIS — I48.11 LONGSTANDING PERSISTENT ATRIAL FIBRILLATION (H): ICD-10-CM

## 2024-05-31 LAB — INR BLD: 2.9 (ref 0.9–1.1)

## 2024-05-31 PROCEDURE — 85610 PROTHROMBIN TIME: CPT

## 2024-05-31 PROCEDURE — 36416 COLLJ CAPILLARY BLOOD SPEC: CPT

## 2024-05-31 NOTE — PROGRESS NOTES
ANTICOAGULATION MANAGEMENT     Fausto Farr 84 year old male is on warfarin with therapeutic INR result. (Goal INR 2.5-3.5)    Recent labs: (last 7 days)     05/31/24  0952   INR 2.9*       ASSESSMENT     Source(s): Chart Review and Patient/Caregiver Call     Warfarin doses taken: More warfarin taken than planned which may be affecting INR. Ralph self-dosed 5 mg daily after 2.5 INR last week on 5/23. Acceptable to continue this dose (shows as 7.7% increase) after production of therapeutic INR today and recent cardioversion.  Diet: No new diet changes identified  Medication/supplement changes: None noted  New illness, injury, or hospitalization: Cardioversion 5/24. Reports he has been feeling great since.  Signs or symptoms of bleeding or clotting: No  Previous result: Therapeutic last 2(+) visits  Additional findings: Recent Cardioversion within 4 weeks; Hx of Recent warfarin dose changes: at least weekly INR x 4 advised. Notify cardiologist and EP pool if INR <= 1.7 within 4 weeks, procedure/surgery hold requested, or non-compliance with monitoring > 1 week.       PLAN     Recommended plan for ongoing change(s) affecting INR     Dosing Instructions: Continue your current warfarin dose (as you've been taking) with next INR in 1 week       Summary  As of 5/31/2024      Full warfarin instructions:  5 mg every day   Next INR check:  6/7/2024               Telephone call with Ralph who verbalizes understanding and agrees to plan    Lab visit scheduled    Education provided:   Please call back if any changes to your diet, medications or how you've been taking warfarin  Taking warfarin: Importance of taking warfarin as instructed  Goal range and lab monitoring: Importance of following up at instructed interval    Plan made per [facility] anticoagulation protocol    Maria Luz Rios, RN  Anticoagulation Clinic  5/31/2024    _______________________________________________________________________     Anticoagulation  Episode Summary       Current INR goal:  2.5-3.5   TTR:  47.5% (1 y)   Target end date:  Indefinite   Send INR reminders to:  SHANNA DOWELL    Indications    S/P aortic valve replacement [Z95.2]  S/P mitral valve replacement [Z95.2]  Long term current use of anticoagulant therapy [Z79.01]  Longstanding persistent atrial fibrillation (H) [I48.11]             Comments:  Per 9/20/22 Cardio Note strict INR goal of 2.5-3.5. If INR falls below 2.5 at any time, needs to be bridged with Lovenox. consent to leave DVM              Anticoagulation Care Providers       Provider Role Specialty Phone number    Jodi Flower Mai, MD Referring Internal Medicine - Pediatrics [phone]

## 2024-05-31 NOTE — CONFIDENTIAL NOTE
General Call    Contacts         Type Contact Phone/Fax    05/31/2024 11:11 AM CDT Phone (Outgoing) Ralph Farr (Self) [phone] (H)    Left Message -  zoe [phone]    05/31/2024 12:32 PM CDT Phone (Outgoing) Ralph Farr (Self) [phone] (H)    05/31/2024 01:52 PM CDT Phone (Incoming) Ralph Farr (Self) [phone] (H)     Anticoag          Reason for Call:  Anticoag    What are your questions or concerns:  Patient is returning call to [facility] regarding today's INR.    Date of last appointment with provider: n/a    [facility] or would you prefer to receive a phone call?:   Patient would prefer a phone call   Okay to leave a detailed message?: Yes at Home number on file [phone] (home)

## 2024-06-02 LAB
ATRIAL RATE - MUSE: 73 BPM
DIASTOLIC BLOOD PRESSURE - MUSE: NORMAL MMHG
INTERPRETATION ECG - MUSE: NORMAL
P AXIS - MUSE: 98 DEGREES
PR INTERVAL - MUSE: 290 MS
QRS DURATION - MUSE: 156 MS
QT - MUSE: 476 MS
QTC - MUSE: 524 MS
R AXIS - MUSE: 233 DEGREES
SYSTOLIC BLOOD PRESSURE - MUSE: NORMAL MMHG
T AXIS - MUSE: 67 DEGREES
VENTRICULAR RATE- MUSE: 73 BPM

## 2024-06-06 ENCOUNTER — LAB (OUTPATIENT)
Dept: LAB | Facility: CLINIC | Age: 84
End: 2024-06-06
Payer: MEDICARE

## 2024-06-06 ENCOUNTER — ANTICOAGULATION THERAPY VISIT (OUTPATIENT)
Dept: ANTICOAGULATION | Facility: CLINIC | Age: 84
End: 2024-06-06

## 2024-06-06 DIAGNOSIS — Z95.2 S/P MITRAL VALVE REPLACEMENT: ICD-10-CM

## 2024-06-06 DIAGNOSIS — Z95.2 S/P AORTIC VALVE REPLACEMENT: ICD-10-CM

## 2024-06-06 DIAGNOSIS — I48.11 LONGSTANDING PERSISTENT ATRIAL FIBRILLATION (H): ICD-10-CM

## 2024-06-06 DIAGNOSIS — Z79.01 LONG TERM CURRENT USE OF ANTICOAGULANT THERAPY: ICD-10-CM

## 2024-06-06 DIAGNOSIS — Z95.2 S/P AORTIC VALVE REPLACEMENT: Primary | ICD-10-CM

## 2024-06-06 LAB — INR BLD: 3.3 (ref 0.9–1.1)

## 2024-06-06 PROCEDURE — 85610 PROTHROMBIN TIME: CPT

## 2024-06-06 PROCEDURE — 36416 COLLJ CAPILLARY BLOOD SPEC: CPT

## 2024-06-06 NOTE — PROGRESS NOTES
Left message to call [phone]. Cardioversion 5/24/24, need to schedule a couple weekly INR appointments.  Cece Rios RN, BSN  Anticoagulation Clinic       Double O-Z Plasty Text: The defect edges were debeveled with a #15 scalpel blade.  Given the location of the defect, shape of the defect and the proximity to free margins a Double O-Z plasty (double transposition flap) was deemed most appropriate.  Using a sterile surgical marker, the appropriate transposition flaps were drawn incorporating the defect and placing the expected incisions within the relaxed skin tension lines where possible. The area thus outlined was incised deep to adipose tissue with a #15 scalpel blade.  The skin margins were undermined to an appropriate distance in all directions utilizing iris scissors.  Hemostasis was achieved with electrocautery.  The flaps were then transposed into place, one clockwise and the other counterclockwise, and anchored with interrupted buried subcutaneous sutures.

## 2024-06-06 NOTE — PROGRESS NOTES
ANTICOAGULATION MANAGEMENT     Fausto Farr 84 year old male is on warfarin with therapeutic INR result. (Goal INR 2.5-3.5)    Recent labs: (last 7 days)     06/06/24  1003   INR 3.3*       ASSESSMENT     Source(s): Chart Review and Patient/Caregiver Call     Warfarin doses taken: Warfarin taken as instructed  Diet: Increased greens/vitamin K in diet; ongoing change for now per patient. Patient reports he has increased broccoli to a few times a week and also has a cabbage salad  Medication/supplement changes: None noted  New illness, injury, or hospitalization: No, continues with back pain, will have PT today, patient reports he did have a back injection about 1 week ago  Signs or symptoms of bleeding or clotting: No  Previous result: Therapeutic last 2(+) visits  Additional findings: Recent Cardioversion within 4 weeks; Hx of Labile INR and Recent warfarin dose changes: at least weekly INR x 4 advised. Notify cardiologist and EP pool if INR <= 1.7 within 4 weeks, procedure/surgery hold requested, or non-compliance with monitoring > 1 week.       PLAN     Recommended plan for temporary change(s) and possibly ongoing change(s) with green veggies affecting INR     Dosing Instructions: Continue your current warfarin dose with next INR in 1 week       Summary  As of 6/6/2024      Full warfarin instructions:  5 mg every day   Next INR check:  6/13/2024               Telephone call with Ralph who verbalizes understanding and agrees to plan    Lab visit scheduled    Education provided:   Please call back if any changes to your diet, medications or how you've been taking warfarin  Contact [phone]  with any changes, questions or concerns.     Plan made per [facility] anticoagulation protocol    Cece Rios RN  Anticoagulation Clinic  6/6/2024    _______________________________________________________________________     Anticoagulation Episode Summary       Current INR goal:  2.5-3.5   TTR:  47.5% (1 y)   Target end  date:  Indefinite   Send INR reminders to:  SHANNA DOWELL    Indications    S/P aortic valve replacement [Z95.2]  S/P mitral valve replacement [Z95.2]  Long term current use of anticoagulant therapy [Z79.01]  Longstanding persistent atrial fibrillation (H) [I48.11]             Comments:  Per 9/20/22 Cardio Note strict INR goal of 2.5-3.5. If INR falls below 2.5 at any time, needs to be bridged with Lovenox. consent to leave DVM              Anticoagulation Care Providers       Provider Role Specialty Phone number    Jodi Flower Mai, MD Referring Internal Medicine - Pediatrics [phone]

## 2024-06-13 ENCOUNTER — LAB (OUTPATIENT)
Dept: LAB | Facility: CLINIC | Age: 84
End: 2024-06-13
Payer: MEDICARE

## 2024-06-13 ENCOUNTER — ANTICOAGULATION THERAPY VISIT (OUTPATIENT)
Dept: ANTICOAGULATION | Facility: CLINIC | Age: 84
End: 2024-06-13

## 2024-06-13 DIAGNOSIS — Z95.2 S/P MITRAL VALVE REPLACEMENT: ICD-10-CM

## 2024-06-13 DIAGNOSIS — Z95.2 S/P AORTIC VALVE REPLACEMENT: ICD-10-CM

## 2024-06-13 DIAGNOSIS — I48.11 LONGSTANDING PERSISTENT ATRIAL FIBRILLATION (H): ICD-10-CM

## 2024-06-13 DIAGNOSIS — Z95.2 S/P AORTIC VALVE REPLACEMENT: Primary | ICD-10-CM

## 2024-06-13 DIAGNOSIS — Z79.01 LONG TERM CURRENT USE OF ANTICOAGULANT THERAPY: ICD-10-CM

## 2024-06-13 LAB — INR BLD: 2.5 (ref 0.9–1.1)

## 2024-06-13 PROCEDURE — 85610 PROTHROMBIN TIME: CPT

## 2024-06-13 PROCEDURE — 36415 COLL VENOUS BLD VENIPUNCTURE: CPT

## 2024-06-13 NOTE — PROGRESS NOTES
ANTICOAGULATION MANAGEMENT     Fausto Farr 84 year old male is on warfarin with therapeutic INR result. (Goal INR 2.5-3.5)    Recent labs: (last 7 days)     06/13/24  0951   INR 2.5*       ASSESSMENT     Source(s): Chart Review and Patient/Caregiver Call     Warfarin doses taken: Warfarin taken as instructed  Diet: No new diet changes identified  Medication/supplement changes: None noted  New illness, injury, or hospitalization: No  Signs or symptoms of bleeding or clotting: No  Previous result: Therapeutic last 2(+) visits  Additional findings: Recent Cardioversion within 4 weeks; Hx of Labile INR: at least weekly INR x 4 advised. Notify cardiologist and EP pool if INR <= 1.7 within 4 weeks, procedure/surgery hold requested, or non-compliance with monitoring > 1 week.       PLAN     Recommended plan for no diet, medication or health factor changes affecting INR     Dosing Instructions: Continue your current warfarin dose with next INR in 1 week       Summary  As of 6/13/2024      Full warfarin instructions:  5 mg every day   Next INR check:  6/19/2024               Telephone call with Ralph who verbalizes understanding and agrees to plan    Lab visit scheduled    Education provided:   Please call back if any changes to your diet, medications or how you've been taking warfarin    Plan made per [facility] anticoagulation protocol    Jame Chadwick RN  Anticoagulation Clinic  6/13/2024    _______________________________________________________________________     Anticoagulation Episode Summary       Current INR goal:  2.5-3.5   TTR:  47.5% (1 y)   Target end date:  Indefinite   Send INR reminders to:  SHANNA DOWELL    Indications    S/P mitral valve replacement [Z95.2]  Long term current use of anticoagulant therapy [Z79.01]  Longstanding persistent atrial fibrillation (H) [I48.11]             Comments:  Per 9/20/22 Cardio Note strict INR goal of 2.5-3.5. If INR falls below 2.5 at any time, needs to be  bridged with Lovenox. consent to leave DVM              Anticoagulation Care Providers       Provider Role Specialty Phone number    Jodi Flower Mai, MD Referring Internal Medicine - Pediatrics [phone]

## 2024-06-14 ENCOUNTER — OFFICE VISIT (OUTPATIENT)
Dept: CARDIOLOGY | Facility: CLINIC | Age: 84
End: 2024-06-14
Payer: MEDICARE

## 2024-06-14 ENCOUNTER — TELEPHONE (OUTPATIENT)
Dept: CARDIOLOGY | Facility: CLINIC | Age: 84
End: 2024-06-14

## 2024-06-14 VITALS
WEIGHT: 200.9 LBS | SYSTOLIC BLOOD PRESSURE: 116 MMHG | DIASTOLIC BLOOD PRESSURE: 68 MMHG | HEIGHT: 65 IN | BODY MASS INDEX: 33.47 KG/M2 | OXYGEN SATURATION: 98 % | HEART RATE: 71 BPM

## 2024-06-14 DIAGNOSIS — I48.19 PERSISTENT ATRIAL FIBRILLATION (H): ICD-10-CM

## 2024-06-14 DIAGNOSIS — I35.9 AORTIC VALVE DISORDER: ICD-10-CM

## 2024-06-14 DIAGNOSIS — I48.91 ATRIAL FIBRILLATION STATUS POST CARDIOVERSION (H): Primary | ICD-10-CM

## 2024-06-14 DIAGNOSIS — I48.4 ATYPICAL ATRIAL FLUTTER (H): ICD-10-CM

## 2024-06-14 DIAGNOSIS — Z95.2 S/P AORTIC VALVE REPLACEMENT: ICD-10-CM

## 2024-06-14 DIAGNOSIS — Z95.2 S/P MITRAL VALVE REPLACEMENT: ICD-10-CM

## 2024-06-14 PROCEDURE — 99213 OFFICE O/P EST LOW 20 MIN: CPT | Performed by: INTERNAL MEDICINE

## 2024-06-14 PROCEDURE — 93000 ELECTROCARDIOGRAM COMPLETE: CPT | Performed by: INTERNAL MEDICINE

## 2024-06-14 RX ORDER — WARFARIN SODIUM 5 MG/1
TABLET ORAL
Qty: 90 TABLET | Refills: 1 | Status: SHIPPED | OUTPATIENT
Start: 2024-06-14 | End: 2024-09-30

## 2024-06-14 NOTE — TELEPHONE ENCOUNTER
ANTICOAGULATION MANAGEMENT:  Medication Refill    Anticoagulation Summary  As of 6/13/2024      Warfarin maintenance plan:  5 mg (5 mg x 1) every day   Next INR check:  6/19/2024   Target end date:  Indefinite    Indications    S/P mitral valve replacement [Z95.2]  Long term current use of anticoagulant therapy [Z79.01]  Longstanding persistent atrial fibrillation (H) [I48.11]                 Anticoagulation Care Providers       Provider Role Specialty Phone number    Jodi Flower Mai, MD Referring Internal Medicine - Pediatrics [phone]            Refill Criteria    Visit with referring provider/group: Meets criteria: office visit within referring provider group in the last 1 year on 11/21/2023    [facility] referral last signed: 01/30/2024; within last year: Yes    Lab monitoring not exceeding 2 weeks overdue: Yes    Fausto meets all criteria for refill. Rx instructions and quantity supplied updated to match patient's current dosing plan. Warfarin 90 day supply with 1 refill granted per [facility] protocol     Aure Sampson RN  Anticoagulation Clinic

## 2024-06-14 NOTE — PROGRESS NOTES
HPI and Plan:   Today's visit was a little unusual I saw Mr. and . Rudy    I am little confused about the pacer clinic and that is what most of today's visit was about .  This patient had a previous history of atrial flutter ablation I believe back in 2019 the pacer did send and alert that he was in atrial flutter on March 25 on his routine May 2 visit pacer clinic talked to the patient where he states he was extremely short of breath for the past month and a half.  The pacer clinic said that they did try to call him on the phone but there was no answer.  As best I can tell none of the physicians were notified about this.  He had successful electrical cardioversion in late May he now states he feels back to being.    I cannot exactly explain to him what happened or if it were to happen again where he went out of rhythm a provider would not be notified about it  We will certainly want to watch out for recurrent afib/flutter.   I will  have the patient see electrophysiologist because if this is atrial flutter typical --perhaps an ablation is indicated will like to get our EP consultants opinion    Separately I will see him back in 6 months we will repeat an echocardiogram since he has mechanical mitral and aortic valves.  Today's visit was 30 minutes    Orders Placed This Encounter   Procedures    Follow-Up with Cardiology    Follow-Up with Cardiology    EKG 12-lead complete w/read - Clinics (performed today)    Echocardiogram Complete     No orders of the defined types were placed in this encounter.    There are no discontinued medications.      Encounter Diagnoses   Name Primary?    Atrial fibrillation status post cardioversion (H) Yes    Aortic valve disorder        CURRENT MEDICATIONS:  Current Outpatient Medications   Medication Sig Dispense Refill    acetaminophen (TYLENOL) 325 MG tablet Take 2 tablets (650 mg) by mouth every 6 hours as needed for mild pain or other (and adjunct with moderate or severe pain or  per patient request) 100 tablet 0    betamethasone valerate (VALISONE) 0.1 % external lotion Apply topically 2 times daily Apply topically to scalp twice daily PRN 60 mL 3    cholecalciferol 25 MCG (1000 UT) TABS Take 1,000 Units by mouth daily      ezetimibe (ZETIA) 10 MG tablet Take 1 tablet (10 mg) by mouth daily 100 tablet 3    ferrous sulfate (FEROSUL) 325 (65 Fe) MG tablet Take 325 mg by mouth daily (with breakfast)      fluocinonide (LIDEX) 0.05 % external ointment Apply topically 2 times daily 60 g 11    glucosamine-chondroitin 500-400 MG CAPS per capsule Take 1 capsule by mouth every evening      mesalamine (ASACOL HD) 800 MG EC tablet Take 1 tablet (800 mg) by mouth 2 times daily 200 tablet 3    metoprolol succinate ER (TOPROL XL) 50 MG 24 hr tablet Take 1 1/2 tab (75mg) daily 135 tablet 3    rosuvastatin (CRESTOR) 40 MG tablet Take 1 tablet (40 mg) by mouth daily 100 tablet 3    tamsulosin (FLOMAX) 0.4 MG capsule Take 1 capsule (0.4 mg) by mouth daily 90 capsule 3    triamcinolone (KENALOG) 0.1 % external cream Apply topically 2 times daily 85.2 g 1    amoxicillin-clavulanate (AUGMENTIN) 875-125 MG tablet Take 1 tablet by mouth daily (Patient not taking: Reported on 6/14/2024)      warfarin ANTICOAGULANT (COUMADIN) 5 MG tablet Take 1 tablet (5mg) everyday OR per INR clinic 90 tablet 1       ALLERGIES     Allergies   Allergen Reactions    Bees Anaphylaxis       PAST MEDICAL HISTORY:  Past Medical History:   Diagnosis Date    Abdominal pain     Anemia     currently taking iron    Back pain     since 1980    BPH (benign prostatic hyperplasia)     Bruit     CAD (coronary artery disease)     Cellulitis 10/18/2012    Cellulitis 05/2018    GrpB strep LLE cellulitis  negative RACHAEL for veg    Chronic venous insufficiency     bilat lower extremities    Contact dermatitis and other eczema, due to unspecified cause     Diaphragmatic hernia without mention of obstruction or gangrene     Diastolic HF (heart failure) (H)      Gastric ulcer     Hypertension 08/06/2013    Lumbago     Mesenteric artery insufficiency (H24)     known AAA and narrowing of intestinal artery's  followed by dr raymond    Metabolic syndrome     Mitral valve disease     s/p mechanical MVR[facility]    Nonallopathic lesion of lumbar region     Nonallopathic lesion of rib cage     Nonallopathic lesion of sacral region     GAGE (obstructive sleep apnea)     CPAP    Paroxysmal atrial fibrillation (H) 10/18/2012    occured after stopping BB  (prior  aflutter ablation)    Prostate cancer (H) 2008    radiation seed, XRT     PVD (peripheral vascular disease) (H24)     Rotator cuff strain     and sprain    S/P AAA repair     S/P aortic valve replacement 2006    developed perivalve leak and MS, therefore redo surg 2013    S/P CABG (coronary artery bypass graft) 2006    Lima-Lad, RA-Rca    Sciatica of left side     since 2000    Sepsis due to group B Streptococcus (H) 05/19/2018    TIA (transient ischemic attack) 08/01/2022    Total knee replacement status 07/22/2019    Ulcerative colitis (H)     Varicose veins of bilateral lower extremities with other complications     s/p RLE vein stripping    Vitamin D deficiency        PAST SURGICAL HISTORY:  Past Surgical History:   Procedure Laterality Date    AORTIC VALVE REPLACEMENT  1/3/06    redo AVR SJM 21mm and SJM MVR 27mm in 2013SJM 21([id number]):AVR, SJM 27 :MVR-    APPLY WOUND VAC N/A 11/12/2019    Procedure: APPLICATION, WOUND VAC;  Surgeon: Sara Martinez MD;  Location: [facility] OR    ARTHROPLASTY KNEE      right knee    ARTHROPLASTY KNEE Right 7/22/2019    Procedure: Right total knee arthroplasty;  Surgeon: Prasanth Jensen MD;  Location: [facility] OR    BACK SURGERY  Oct 2015    Fusion L4-5, laminectomy L2, L3    BYPASS GRAFT ARTERY CORONARY  10/2013    reimplantation of radial artery graft to RCA    CARDIAC CATHERIZATION  11/2005    Stent placed to RCA    CARDIAC CATHERIZATION  04/2013    Occluded RCA,  patent LIMA to LAD and radial graft to PDA    CARPAL TUNNEL RELEASE RT/LT  1994    COLONOSCOPY  8-22-11    CYSTOSCOPY FLEXIBLE  10/16/2013    Procedure: CYSTOSCOPY FLEXIBLE;  FLEXIBLE CYSTOSCOPY / DILATION OF URETHRA / INSERTION OF LESLIE;  Surgeon: Cooper Wallace MD;  Location:  OR    ENDOVASCULAR REPAIR, INFRARENAL ABDOMINAL AORTIC ANEURYSM/DISSECTION; MODULAR BIFURCATED PROSTHESIS  2006    AAA repair endovascular    ENT SURGERY      EP ABLATION ATRIAL FLUTTER N/A 4/22/2019    Procedure: EP Ablation Atrial Flutter;  Surgeon: Jessy Vasquez MD;  Location:  [facility]    EP PACEMAKER DEVICE & LEAD IMPLANT- RIGHT ATRIAL & RIGHT VENTRICULAR N/A 8/2/2022    Procedure: Pacemaker Device & Lead Implant - Right Atrial & Right Ventricular;  Surgeon: Jessy Vasquez MD;  Location:  [facility]ARD[facility] CATH LAB    GENITOURINARY SURGERY  6/16/08    radioactive seeding    GRAFT FLAP PEDICLE EXTREMITY (LOCATION) Right 11/12/2019    Procedure: SURGICAL PROCUREMENT, FLAP, PEDICLE, EXTREMITY;  Surgeon: Sara Martinez MD;  Location:  OR    GRAFT SKIN SPLIT THICKNESS FROM EXTREMITY Right 11/12/2019    Procedure: RIGHT GASTRONEMIUS FLAP TO RIGHT KNEE, SPLIT THICKNESS SKIN GRAFT FROM RIGHT THIGH TO RIGHT KNEE, SURGICAL PROCUREMENT, FLAP, PEDICLE, EXTREMITY, WOUND VAC PLACEMENT;  Surgeon: Sara Martinez MD;  Location:  OR    HEAD & NECK SURGERY  1997    vocal cord polypectomy    INCISION AND DRAINAGE KNEE, COMBINED Right 8/29/2019    Procedure: INCISION AND DRAINAGE, KNEE W/ Secondary Wound Closure;  Surgeon: Prasanth Jensen MD;  Location:  OR    IRRIGATION AND DEBRIDEMENT KNEE, PLACE ANTIBIOTIC CEMENT BEADS / SPACE Right 2/12/2020    Procedure: 1. Irrigation and debridement of wound breakdown, right total knee arthroplasty.  2. Irrigation and debridement of right total knee with placement of antibiotic-impregnated (vancomycin) absorbable antibiotic beads.;  Surgeon:  Prasanth Jensen MD;  Location: [facility] OR    IRRIGATION AND DEBRIDEMENT LOWER EXTREMITY, COMBINED Right 12/8/2019    Procedure: DEBRIDEMENT OF RIGHT CALF AND WOUND CLOSURE.;  Surgeon: Sara Martinez MD;  Location:  OR    IRRIGATION AND DEBRIDEMENT UPPER EXTREMITY, COMBINED Right 1/7/2023    Procedure: Right elbow arthrotomy, irrigation and debridement;  Surgeon: Jose A Christine MD;  Location: [facility] OR    KNEE SURGERY  2001 Right knee arthroscopy    OPTICAL TRACKING SYSTEM FUSION SPINE POSTERIOR LUMBAR THREE+ LEVELS N/A 10/29/2015    Procedure: OPTICAL TRACKING SYSTEM FUSION SPINE POSTERIOR LUMBAR THREE+ LEVELS;  Surgeon: Walt Garcia MD;  Location:  OR    PROSTATE SURGERY      radioactive seeding 6/16/08    REPAIR ANEURYSM ABDOMINAL AORTA  06/08    REPAIR VALVE MITRAL  10/16/2013    SJM 21([id number]):AVR, SJM 27 :MVRProcedure: REPAIR VALVE MITRAL;  REDO STERNOTOMY/REDO AORTIC VALVE REPLACEMENT/ MITRAL VALVE REPLACEMENT/REIMPLANTATION OF RIGHT CORONARY ARTERY BYPASS WITH RACHAEL ( ON PUMP);  Surgeon: Viet Singh MD;  Location:  OR    REPLACE VALVE AORTIC  10/16/2013    Procedure: REPLACE VALVE AORTIC;;  Surgeon: Viet Singh MD;  Location:  OR    SURGERY GENERAL IP CONSULT  05/2008 Excision aneurysm abdominal aorta    SURGERY GENERAL IP CONSULT  1997 Vocal cord polypectomy    VASCULAR SURGERY  1968, 1993     varicose vein stripping    ZZC CABG, VEIN, TWO  1/3/06    Left radial to RCA, LIMA to LAD (RA to RCA reimplanted at time of 2013 surg)       FAMILY HISTORY:  Family History   Problem Relation Age of Onset    No Known Problems Mother     Coronary Artery Disease Father         CABG    Heart Disease Father         Pacemaker    No Known Problems Brother     No Known Problems Sister     No Known Problems Son     Other Cancer Daughter     No Known Problems Daughter     Heart Disease Brother     Other - See Comments Grandchild        SOCIAL HISTORY:  Social History  "    Socioeconomic History    Marital status:      Spouse name: None    Number of children: None    Years of education: None    Highest education level: None   Tobacco Use    Smoking status: Former     Current packs/day: 0.00     Average packs/day: 1 pack/day for 40.5 years (40.5 ttl pk-yrs)     Types: Cigarettes     Start date: 4/15/1962     Quit date: 10/23/2002     Years since quittin.6    Smokeless tobacco: Never   Vaping Use    Vaping status: Never Used   Substance and Sexual Activity    Alcohol use: Yes     Comment: a couple beers per week (socially)    Drug use: No    Sexual activity: Not Currently     Partners: Female   Other Topics Concern    Parent/sibling w/ CABG, MI or angioplasty before 65F 55M? Yes     Comment: Brother had bypass at 55    Caffeine Concern No     Comment: 6-8 cups of half and half per day    Special Diet No    Exercise No     Social Determinants of Health     Interpersonal Safety: Low Risk  (10/24/2023)    Interpersonal Safety     Do you feel physically and emotionally safe where you currently live?: Yes     Within the past 12 months, have you been hit, slapped, kicked or otherwise physically hurt by someone?: No     Within the past 12 months, have you been humiliated or emotionally abused in other ways by your partner or ex-partner?: No       Review of Systems:  Skin:        Eyes:       ENT:       Respiratory:  Positive for dyspnea on exertion;sleep apnea;CPAP  Cardiovascular:  Negative    Gastroenterology:      Genitourinary:       Musculoskeletal:       Neurologic:       Psychiatric:       Heme/Lymph/Imm:       Endocrine:         Physical Exam:  Vitals: /68 (BP Location: Right arm, Patient Position: Sitting, Cuff Size: Adult Regular)   Pulse 71   Ht 1.651 m (5' 5\")   Wt 91.1 kg (200 lb 14.4 oz)   SpO2 98%   BMI 33.43 kg/m   Orthostatic Vitals from 24 1029 to 24 1029    Date and Time Orthostatic BP Orthostatic Pulse Patient Position BP   Location Cuff " Size   06/14/24 0935 -- -- Sitting Right arm Adult Regular         Constitutional:           Skin:             Head:           Eyes:           Lymph:      ENT:           Neck:           Respiratory:            Cardiac:                                                           GI:           Extremities and Muscular Skeletal:                 Neurological:           Psych:         Recent Lab Results:  LIPID RESULTS:  Lab Results   Component Value Date    CHOL 122 08/01/2023    CHOL 152 06/01/2020    HDL 31 (L) 08/01/2023    HDL 34 (L) 06/01/2020    LDL 57 08/01/2023    LDL 90 06/01/2020    TRIG 172 (H) 08/01/2023    TRIG 138 06/01/2020    CHOLHDLRATIO 3.6 06/03/2015       LIVER ENZYME RESULTS:  Lab Results   Component Value Date    AST 33 08/01/2023    AST 19 06/01/2020    ALT 28 08/01/2023    ALT 16 06/01/2020       CBC RESULTS:  Lab Results   Component Value Date    WBC 7.2 02/16/2024    WBC 7.6 06/15/2020    RBC 4.48 02/16/2024    RBC 4.44 06/15/2020    HGB 12.8 (L) 02/16/2024    HGB 13.0 (L) 06/15/2020    HCT 41.0 02/16/2024    HCT 40.0 06/15/2020    MCV 92 02/16/2024    MCV 90 06/15/2020    MCH 28.6 02/16/2024    MCH 29.3 06/15/2020    MCHC 31.2 (L) 02/16/2024    MCHC 32.5 06/15/2020    RDW 12.5 02/16/2024    RDW 13.3 06/15/2020     02/16/2024     06/15/2020       BMP RESULTS:  Lab Results   Component Value Date     02/16/2024     06/01/2020    POTASSIUM 3.9 05/24/2024    POTASSIUM 4.0 08/03/2022    POTASSIUM 4.0 06/01/2020    CHLORIDE 103 02/16/2024    CHLORIDE 107 08/03/2022    CHLORIDE 106 06/01/2020    CO2 26 02/16/2024    CO2 27 08/03/2022    CO2 26 06/01/2020    ANIONGAP 9 02/16/2024    ANIONGAP 6 08/03/2022    ANIONGAP 4 06/01/2020     (H) 02/16/2024     (H) 01/08/2023     (H) 08/03/2022     (H) 06/01/2020    BUN 19.6 02/16/2024    BUN 15 08/03/2022    BUN 13 06/01/2020    CR 1.01 02/16/2024    CR 0.73 06/01/2020    GFRESTIMATED 74 02/16/2024     GFRESTIMATED >60 10/12/2022    GFRESTIMATED 72 10/06/2020    GFRESTBLACK 87 10/06/2020    AWILDA 9.3 02/16/2024    AWILDA 8.5 06/01/2020        A1C RESULTS:  Lab Results   Component Value Date    A1C 6.8 (H) 05/16/2024    A1C 5.9 04/25/2017       INR RESULTS:  Lab Results   Component Value Date    INR 2.5 (H) 06/13/2024    INR 3.3 (H) 06/06/2024    INR 1.70 (H) 01/10/2023    INR 1.37 (H) 01/09/2023    INR 2.00 (H) 07/08/2021    INR 1.80 (H) 06/17/2021           CC  No referring provider defined for this encounter.

## 2024-06-14 NOTE — LETTER
6/14/2024    Chris Flower MD  [address] [place] [address]    RE: Fausto Farr       Dear Colleague,     I had the pleasure of seeing Fausto Farr in the [facility].  HPI and Plan:   Today's visit was a little unusual I saw  and Mrs. Grant    I am little confused about the pacer clinic and that is what most of today's visit was about .  This patient had a previous history of atrial flutter ablation I believe back in 2019 the pacer did send and alert that he was in atrial flutter on March 25 on his routine May 2 visit pacer clinic talked to the patient where he states he was extremely short of breath for the past month and a half.  The pacer clinic said that they did try to call him on the phone but there was no answer.  As best I can tell none of the physicians were notified about this.  He had successful electrical cardioversion in late May he now states he feels back to being.    I cannot exactly explain to him what happened or if it were to happen again where he went out of rhythm a provider would not be notified about it  We will certainly want to watch out for recurrent afib/flutter.   I will  have the patient see electrophysiologist because if this is atrial flutter typical --perhaps an ablation is indicated will like to get our EP consultants opinion    Separately I will see him back in 6 months we will repeat an echocardiogram since he has mechanical mitral and aortic valves.  Today's visit was 30 minutes    Orders Placed This Encounter   Procedures    Follow-Up with Cardiology    Follow-Up with Cardiology    EKG 12-lead complete w/read - Clinics (performed today)    Echocardiogram Complete     No orders of the defined types were placed in this encounter.    There are no discontinued medications.      Encounter Diagnoses   Name Primary?    Atrial fibrillation status post cardioversion (H) Yes    Aortic valve disorder        CURRENT MEDICATIONS:  Current  Outpatient Medications   Medication Sig Dispense Refill    acetaminophen (TYLENOL) 325 MG tablet Take 2 tablets (650 mg) by mouth every 6 hours as needed for mild pain or other (and adjunct with moderate or severe pain or per patient request) 100 tablet 0    betamethasone valerate (VALISONE) 0.1 % external lotion Apply topically 2 times daily Apply topically to scalp twice daily PRN 60 mL 3    cholecalciferol 25 MCG (1000 UT) TABS Take 1,000 Units by mouth daily      ezetimibe (ZETIA) 10 MG tablet Take 1 tablet (10 mg) by mouth daily 100 tablet 3    ferrous sulfate (FEROSUL) 325 (65 Fe) MG tablet Take 325 mg by mouth daily (with breakfast)      fluocinonide (LIDEX) 0.05 % external ointment Apply topically 2 times daily 60 g 11    glucosamine-chondroitin 500-400 MG CAPS per capsule Take 1 capsule by mouth every evening      mesalamine (ASACOL HD) 800 MG EC tablet Take 1 tablet (800 mg) by mouth 2 times daily 200 tablet 3    metoprolol succinate ER (TOPROL XL) 50 MG 24 hr tablet Take 1 1/2 tab (75mg) daily 135 tablet 3    rosuvastatin (CRESTOR) 40 MG tablet Take 1 tablet (40 mg) by mouth daily 100 tablet 3    tamsulosin (FLOMAX) 0.4 MG capsule Take 1 capsule (0.4 mg) by mouth daily 90 capsule 3    triamcinolone (KENALOG) 0.1 % external cream Apply topically 2 times daily 85.2 g 1    amoxicillin-clavulanate (AUGMENTIN) 875-125 MG tablet Take 1 tablet by mouth daily (Patient not taking: Reported on 6/14/2024)      warfarin ANTICOAGULANT (COUMADIN) 5 MG tablet Take 1 tablet (5mg) everyday OR per INR clinic 90 tablet 1       ALLERGIES     Allergies   Allergen Reactions    Bees Anaphylaxis       PAST MEDICAL HISTORY:  Past Medical History:   Diagnosis Date    Abdominal pain     Anemia     currently taking iron    Back pain     since 1980    BPH (benign prostatic hyperplasia)     Bruit     CAD (coronary artery disease)     Cellulitis 10/18/2012    Cellulitis 05/2018    GrpB strep LLE cellulitis  negative RACHAEL for veg     Chronic venous insufficiency     bilat lower extremities    Contact dermatitis and other eczema, due to unspecified cause     Diaphragmatic hernia without mention of obstruction or gangrene     Diastolic HF (heart failure) (H)     Gastric ulcer     Hypertension 08/06/2013    Lumbago     Mesenteric artery insufficiency (H24)     known AAA and narrowing of intestinal artery's  followed by dr raymond    Metabolic syndrome     Mitral valve disease     s/p mechanical MVR, prior mech AVR    Nonallopathic lesion of lumbar region     Nonallopathic lesion of rib cage     Nonallopathic lesion of sacral region     GAGE (obstructive sleep apnea)     CPAP    Paroxysmal atrial fibrillation (H) 10/18/2012    occured after stopping BB  (prior  aflutter ablation)    Prostate cancer (H) 2008    radiation seed, XRT     PVD (peripheral vascular disease) (H24)     Rotator cuff strain     and sprain    S/P AAA repair     S/P aortic valve replacement 2006    developed perivalve leak and MS, therefore redo surg 2013    S/P CABG (coronary artery bypass graft) 2006    Lima-Lad, RA-Rca    Sciatica of left side     since 2000    Sepsis due to group B Streptococcus (H) 05/19/2018    TIA (transient ischemic attack) 08/01/2022    Total knee replacement status 07/22/2019    Ulcerative colitis (H)     Varicose veins of bilateral lower extremities with other complications     s/p RLE vein stripping    Vitamin D deficiency        PAST SURGICAL HISTORY:  Past Surgical History:   Procedure Laterality Date    AORTIC VALVE REPLACEMENT  1/3/06    redo AVR SJM 21mm and SJM MVR 27mm in 2013SJM 21([id number]):AVR, SJM 27 :MVR-    APPLY WOUND VAC N/A 11/12/2019    Procedure: APPLICATION, WOUND VAC;  Surgeon: Sara Martinez MD;  Location: [facility] OR    ARTHROPLASTY KNEE      right knee    ARTHROPLASTY KNEE Right 7/22/2019    Procedure: Right total knee arthroplasty;  Surgeon: Prasanth Jensen MD;  Location: [facility] OR    BACK SURGERY  Oct 2015    Fusion  L4-5, laminectomy L2, L3    BYPASS GRAFT ARTERY CORONARY  10/2013    reimplantation of radial artery graft to RCA    CARDIAC CATHERIZATION  11/2005    Stent placed to RCA    CARDIAC CATHERIZATION  04/2013    Occluded RCA, patent LIMA to LAD and radial graft to PDA    CARPAL TUNNEL RELEASE RT/LT  1994    COLONOSCOPY  8-22-11    CYSTOSCOPY FLEXIBLE  10/16/2013    Procedure: CYSTOSCOPY FLEXIBLE;  FLEXIBLE CYSTOSCOPY / DILATION OF URETHRA / INSERTION OF LESLIE;  Surgeon: Cooper Wallace MD;  Location:  OR    ENDOVASCULAR REPAIR, INFRARENAL ABDOMINAL AORTIC ANEURYSM/DISSECTION; MODULAR BIFURCATED PROSTHESIS  2006    AAA repair endovascular    ENT SURGERY      EP ABLATION ATRIAL FLUTTER N/A 4/22/2019    Procedure: EP Ablation Atrial Flutter;  Surgeon: Jessy Vasquez MD;  Location:  HEART CARDIAC CATH LAB    EP PACEMAKER DEVICE & LEAD IMPLANT- RIGHT ATRIAL & RIGHT VENTRICULAR N/A 8/2/2022    Procedure: Pacemaker Device & Lead Implant - Right Atrial & Right Ventricular;  Surgeon: Jessy Vasquez MD;  Location:  HEART CARDIAC CATH LAB    GENITOURINARY SURGERY  6/16/08    radioactive seeding    GRAFT FLAP PEDICLE EXTREMITY (LOCATION) Right 11/12/2019    Procedure: SURGICAL PROCUREMENT, FLAP, PEDICLE, EXTREMITY;  Surgeon: Sara Martinez MD;  Location:  OR    GRAFT SKIN SPLIT THICKNESS FROM EXTREMITY Right 11/12/2019    Procedure: RIGHT GASTRONEMIUS FLAP TO RIGHT KNEE, SPLIT THICKNESS SKIN GRAFT FROM RIGHT THIGH TO RIGHT KNEE, SURGICAL PROCUREMENT, FLAP, PEDICLE, EXTREMITY, WOUND VAC PLACEMENT;  Surgeon: Sara Martinez MD;  Location:  OR    HEAD & NECK SURGERY  1997    vocal cord polypectomy    INCISION AND DRAINAGE KNEE, COMBINED Right 8/29/2019    Procedure: INCISION AND DRAINAGE, KNEE W/ Secondary Wound Closure;  Surgeon: Prasanth Jensen MD;  Location:  OR    IRRIGATION AND DEBRIDEMENT KNEE, PLACE ANTIBIOTIC CEMENT BEADS / SPACE Right 2/12/2020    Procedure: 1.  Irrigation and debridement of wound breakdown, right total knee arthroplasty.  2. Irrigation and debridement of right total knee with placement of antibiotic-impregnated (vancomycin) absorbable antibiotic beads.;  Surgeon: Prasanth Jensen MD;  Location:  OR    IRRIGATION AND DEBRIDEMENT LOWER EXTREMITY, COMBINED Right 12/8/2019    Procedure: DEBRIDEMENT OF RIGHT CALF AND WOUND CLOSURE.;  Surgeon: Sara Martinez MD;  Location:  OR    IRRIGATION AND DEBRIDEMENT UPPER EXTREMITY, COMBINED Right 1/7/2023    Procedure: Right elbow arthrotomy, irrigation and debridement;  Surgeon: Jose A Christine MD;  Location:  OR    KNEE SURGERY  2001 Right knee arthroscopy    OPTICAL TRACKING SYSTEM FUSION SPINE POSTERIOR LUMBAR THREE+ LEVELS N/A 10/29/2015    Procedure: OPTICAL TRACKING SYSTEM FUSION SPINE POSTERIOR LUMBAR THREE+ LEVELS;  Surgeon: Walt Garcia MD;  Location:  OR    PROSTATE SURGERY      radioactive seeding 6/16/08    REPAIR ANEURYSM ABDOMINAL AORTA  06/08    REPAIR VALVE MITRAL  10/16/2013    SJM 21([id number]):AVR, SJM 27 :MVRProcedure: REPAIR VALVE MITRAL;  REDO STERNOTOMY/REDO AORTIC VALVE REPLACEMENT/ MITRAL VALVE REPLACEMENT/REIMPLANTATION OF RIGHT CORONARY ARTERY BYPASS WITH RACHAEL ( ON PUMP);  Surgeon: Viet Singh MD;  Location:  OR    REPLACE VALVE AORTIC  10/16/2013    Procedure: REPLACE VALVE AORTIC;;  Surgeon: Viet Singh MD;  Location:  OR    SURGERY GENERAL IP CONSULT  05/2008 Excision aneurysm abdominal aorta    SURGERY GENERAL IP CONSULT  1997 Vocal cord polypectomy    VASCULAR SURGERY  1968, 1993     varicose vein stripping    ZZC CABG, VEIN, TWO  1/3/06    Left radial to RCA, LIMA to LAD (RA to RCA reimplanted at time of 2013 surg)       FAMILY HISTORY:  Family History   Problem Relation Age of Onset    No Known Problems Mother     Coronary Artery Disease Father         CABG    Heart Disease Father         Pacemaker    No Known Problems Brother      No Known Problems Sister     No Known Problems Son     Other Cancer Daughter     No Known Problems Daughter     Heart Disease Brother     Other - See Comments Grandchild        SOCIAL HISTORY:  Social History     Socioeconomic History    Marital status:      Spouse name: None    Number of children: None    Years of education: None    Highest education level: None   Tobacco Use    Smoking status: Former     Current packs/day: 0.00     Average packs/day: 1 pack/day for 40.5 years (40.5 ttl pk-yrs)     Types: Cigarettes     Start date: 4/15/1962     Quit date: 10/23/2002     Years since quittin.6    Smokeless tobacco: Never   Vaping Use    Vaping status: Never Used   Substance and Sexual Activity    Alcohol use: Yes     Comment: a couple beers per week (socially)    Drug use: No    Sexual activity: Not Currently     Partners: Female   Other Topics Concern    Parent/sibling w/ CABG, MI or angioplasty before 65F 55M? Yes     Comment: Brother had bypass at 55    Caffeine Concern No     Comment: 6-8 cups of half and half per day    Special Diet No    Exercise No     Social Determinants of Health     Interpersonal Safety: Low Risk  (10/24/2023)    Interpersonal Safety     Do you feel physically and emotionally safe where you currently live?: Yes     Within the past 12 months, have you been hit, slapped, kicked or otherwise physically hurt by someone?: No     Within the past 12 months, have you been humiliated or emotionally abused in other ways by your partner or ex-partner?: No       Review of Systems:  Skin:        Eyes:       ENT:       Respiratory:  Positive for dyspnea on exertion;sleep apnea;CPAP  Cardiovascular:  Negative    Gastroenterology:      Genitourinary:       Musculoskeletal:       Neurologic:       Psychiatric:       Heme/Lymph/Imm:       Endocrine:         Physical Exam:  Vitals: /68 (BP Location: Right arm, Patient Position: Sitting, Cuff Size: Adult Regular)   Pulse 71   Ht 1.651 m  "(5' 5\")   Wt 91.1 kg (200 lb 14.4 oz)   SpO2 98%   BMI 33.43 kg/m   Orthostatic Vitals from 06/12/24 1029 to 06/14/24 1029    Date and Time Orthostatic BP Orthostatic Pulse Patient Position BP   Location Cuff Size   06/14/24 0935 -- -- Sitting Right arm Adult Regular         Constitutional:           Skin:             Head:           Eyes:           Lymph:      ENT:           Neck:           Respiratory:            Cardiac:                                                           GI:           Extremities and Muscular Skeletal:                 Neurological:           Psych:         Recent Lab Results:  LIPID RESULTS:  Lab Results   Component Value Date    CHOL 122 08/01/2023    CHOL 152 06/01/2020    HDL 31 (L) 08/01/2023    HDL 34 (L) 06/01/2020    LDL 57 08/01/2023    LDL 90 06/01/2020    TRIG 172 (H) 08/01/2023    TRIG 138 06/01/2020    CHOLHDLRATIO 3.6 06/03/2015       LIVER ENZYME RESULTS:  Lab Results   Component Value Date    AST 33 08/01/2023    AST 19 06/01/2020    ALT 28 08/01/2023    ALT 16 06/01/2020       CBC RESULTS:  Lab Results   Component Value Date    WBC 7.2 02/16/2024    WBC 7.6 06/15/2020    RBC 4.48 02/16/2024    RBC 4.44 06/15/2020    HGB 12.8 (L) 02/16/2024    HGB 13.0 (L) 06/15/2020    HCT 41.0 02/16/2024    HCT 40.0 06/15/2020    MCV 92 02/16/2024    MCV 90 06/15/2020    MCH 28.6 02/16/2024    MCH 29.3 06/15/2020    MCHC 31.2 (L) 02/16/2024    MCHC 32.5 06/15/2020    RDW 12.5 02/16/2024    RDW 13.3 06/15/2020     02/16/2024     06/15/2020       BMP RESULTS:  Lab Results   Component Value Date     02/16/2024     06/01/2020    POTASSIUM 3.9 05/24/2024    POTASSIUM 4.0 08/03/2022    POTASSIUM 4.0 06/01/2020    CHLORIDE 103 02/16/2024    CHLORIDE 107 08/03/2022    CHLORIDE 106 06/01/2020    CO2 26 02/16/2024    CO2 27 08/03/2022    CO2 26 06/01/2020    ANIONGAP 9 02/16/2024    ANIONGAP 6 08/03/2022    ANIONGAP 4 06/01/2020     (H) 02/16/2024     (H) " 01/08/2023     (H) 08/03/2022     (H) 06/01/2020    BUN 19.6 02/16/2024    BUN 15 08/03/2022    BUN 13 06/01/2020    CR 1.01 02/16/2024    CR 0.73 06/01/2020    GFRESTIMATED 74 02/16/2024    GFRESTIMATED >60 10/12/2022    GFRESTIMATED 72 10/06/2020    GFRESTBLACK 87 10/06/2020    AWILDA 9.3 02/16/2024    AWILDA 8.5 06/01/2020        A1C RESULTS:  Lab Results   Component Value Date    A1C 6.8 (H) 05/16/2024    A1C 5.9 04/25/2017       INR RESULTS:  Lab Results   Component Value Date    INR 2.5 (H) 06/13/2024    INR 3.3 (H) 06/06/2024    INR 1.70 (H) 01/10/2023    INR 1.37 (H) 01/09/2023    INR 2.00 (H) 07/08/2021    INR 1.80 (H) 06/17/2021           CC  No referring provider defined for this encounter.    Thank you for allowing me to participate in the care of your patient.      Sincerely,     Calderon Santo MD     [facility]

## 2024-06-19 ENCOUNTER — ANTICOAGULATION THERAPY VISIT (OUTPATIENT)
Dept: ANTICOAGULATION | Facility: CLINIC | Age: 84
End: 2024-06-19

## 2024-06-19 ENCOUNTER — LAB (OUTPATIENT)
Dept: LAB | Facility: CLINIC | Age: 84
End: 2024-06-19
Payer: MEDICARE

## 2024-06-19 DIAGNOSIS — Z95.2 S/P AORTIC VALVE REPLACEMENT: ICD-10-CM

## 2024-06-19 DIAGNOSIS — I48.11 LONGSTANDING PERSISTENT ATRIAL FIBRILLATION (H): ICD-10-CM

## 2024-06-19 DIAGNOSIS — Z95.2 S/P MITRAL VALVE REPLACEMENT: Primary | ICD-10-CM

## 2024-06-19 DIAGNOSIS — Z95.2 S/P MITRAL VALVE REPLACEMENT: ICD-10-CM

## 2024-06-19 DIAGNOSIS — Z79.01 LONG TERM CURRENT USE OF ANTICOAGULANT THERAPY: ICD-10-CM

## 2024-06-19 LAB — INR BLD: 2.8 (ref 0.9–1.1)

## 2024-06-19 PROCEDURE — 36415 COLL VENOUS BLD VENIPUNCTURE: CPT

## 2024-06-19 PROCEDURE — 85610 PROTHROMBIN TIME: CPT

## 2024-06-19 NOTE — PROGRESS NOTES
ANTICOAGULATION MANAGEMENT     Fausto Farr 84 year old male is on warfarin with therapeutic INR result. (Goal INR 2.5-3.5)    Recent labs: (last 7 days)     06/19/24  0948   INR 2.8*       ASSESSMENT     Source(s): Chart Review and Patient/Caregiver Call     Warfarin doses taken: Warfarin taken as instructed  Diet: No new diet changes identified  Medication/supplement changes: None noted  New illness, injury, or hospitalization: No  Signs or symptoms of bleeding or clotting: No  Previous result: Therapeutic last 2(+) visits  Additional findings: None.  Has had 4 weeks post cardioversion checks within range.  Will resume usual testing schedule.  Lost about 10 lbs in the last 2 months since he wasn't eating as many desserts.       PLAN     Recommended plan for no diet, medication or health factor changes affecting INR     Dosing Instructions: Continue your current warfarin dose with next INR in 2 weeks       Summary  As of 6/19/2024      Full warfarin instructions:  5 mg every day   Next INR check:  7/3/2024               Telephone call with Ralph who agrees to plan and repeated back plan correctly    Lab visit scheduled    Education provided:   Please call back if any changes to your diet, medications or how you've been taking warfarin    Plan made per [facility] anticoagulation protocol    Ruthann Sanders RN  Anticoagulation Clinic  6/19/2024    _______________________________________________________________________     Anticoagulation Episode Summary       Current INR goal:  2.5-3.5   TTR:  47.5% (1 y)   Target end date:  Indefinite   Send INR reminders to:  SHANNA DOWELL    Indications    S/P mitral valve replacement [Z95.2]  Long term current use of anticoagulant therapy [Z79.01]  Longstanding persistent atrial fibrillation (H) [I48.11]             Comments:  Per 9/20/22 Cardio Note strict INR goal of 2.5-3.5. If INR falls below 2.5 at any time, needs to be bridged with Lovenox. consent to leave DVM               Anticoagulation Care Providers       Provider Role Specialty Phone number    Jodi Flower Mai, MD Referring Internal Medicine - Pediatrics [phone]

## 2024-06-20 NOTE — PROGRESS NOTES
[employer] from 5/24/2024. Pt was in the hospital for aflutter and had a DCCV.    [employer] Accolade (D) PPM  Presenting rhythm: AS/  Battery 9 yrs estimated longevity   Available lead measurements WNL  Mode: DDD   AP 12%   100%  Pt was in AF/flutter since late March until DCCV. No other arrhythmias recorded.   Pt on warfarin  Next remote check on 8/9/2024.   EC RN

## 2024-06-24 ENCOUNTER — TELEPHONE (OUTPATIENT)
Dept: CARDIOLOGY | Facility: CLINIC | Age: 84
End: 2024-06-24
Payer: MEDICARE

## 2024-06-24 NOTE — TELEPHONE ENCOUNTER
Called pt and LMOM- per Odalys ANDERSON- please get pt in much sooner to see Dr Stauffer (Currently scheduled mid- NOV) Some appt holds were found in Sept on the [place] template. Asked pt to call us back- MK

## 2024-06-26 ENCOUNTER — TELEPHONE (OUTPATIENT)
Dept: CARDIOLOGY | Facility: CLINIC | Age: 84
End: 2024-06-26
Payer: MEDICARE

## 2024-06-26 ENCOUNTER — TRANSFERRED RECORDS (OUTPATIENT)
Dept: HEALTH INFORMATION MANAGEMENT | Facility: CLINIC | Age: 84
End: 2024-06-26
Payer: MEDICARE

## 2024-06-26 PROBLEM — G89.29 CHRONIC RIGHT-SIDED LOW BACK PAIN WITH RIGHT-SIDED SCIATICA: Status: RESOLVED | Noted: 2023-12-04 | Resolved: 2024-06-26

## 2024-06-26 PROBLEM — M79.651 PAIN OF RIGHT THIGH: Status: RESOLVED | Noted: 2023-12-04 | Resolved: 2024-06-26

## 2024-06-26 PROBLEM — M25.551 HIP PAIN, RIGHT: Status: RESOLVED | Noted: 2023-12-04 | Resolved: 2024-06-26

## 2024-06-26 PROBLEM — M54.41 CHRONIC RIGHT-SIDED LOW BACK PAIN WITH RIGHT-SIDED SCIATICA: Status: RESOLVED | Noted: 2023-12-04 | Resolved: 2024-06-26

## 2024-06-26 NOTE — TELEPHONE ENCOUNTER
Received message from patient stating he was calling to get the latest information on his pacemaker interrogation.     Called and spoke with patient and his wife.  Patient is going to be having an ablation done on his back at [facility] and they would like the most recent device check faxed to Dr. Schmidt at [facility].  Fax: [phone], Phone: [phone].     In-clinic device check from 5/2/24 and [employer] from 5/24/24 faxed as requested.     JUSTIN Yates

## 2024-07-03 ENCOUNTER — TELEPHONE (OUTPATIENT)
Dept: PEDIATRICS | Facility: CLINIC | Age: 84
End: 2024-07-03

## 2024-07-03 ENCOUNTER — ANTICOAGULATION THERAPY VISIT (OUTPATIENT)
Dept: ANTICOAGULATION | Facility: CLINIC | Age: 84
End: 2024-07-03

## 2024-07-03 ENCOUNTER — LAB (OUTPATIENT)
Dept: LAB | Facility: CLINIC | Age: 84
End: 2024-07-03
Payer: MEDICARE

## 2024-07-03 DIAGNOSIS — Z95.2 S/P AORTIC VALVE REPLACEMENT: ICD-10-CM

## 2024-07-03 DIAGNOSIS — Z95.2 S/P MITRAL VALVE REPLACEMENT: Primary | ICD-10-CM

## 2024-07-03 DIAGNOSIS — Z95.2 S/P MITRAL VALVE REPLACEMENT: ICD-10-CM

## 2024-07-03 DIAGNOSIS — I48.11 LONGSTANDING PERSISTENT ATRIAL FIBRILLATION (H): ICD-10-CM

## 2024-07-03 DIAGNOSIS — Z79.01 LONG TERM CURRENT USE OF ANTICOAGULANT THERAPY: ICD-10-CM

## 2024-07-03 LAB — INR BLD: 4 (ref 0.9–1.1)

## 2024-07-03 PROCEDURE — 85610 PROTHROMBIN TIME: CPT

## 2024-07-03 PROCEDURE — 36416 COLLJ CAPILLARY BLOOD SPEC: CPT

## 2024-07-03 NOTE — TELEPHONE ENCOUNTER
Attempted to return call to patient, no answer, eft message to call [phone].   Cece Rios RN, BSN  Anticoagulation Clinic

## 2024-07-03 NOTE — TELEPHONE ENCOUNTER
Left messages on both home and cell phone to return call.  Cece Rios RN, BSN  Anticoagulation Clinic

## 2024-07-03 NOTE — TELEPHONE ENCOUNTER
Reason for Call:  INR follow up    Detailed comments: Patient is returning call to Cece, Please call again    Phone Number Patient can be reached at: Home number on file [phone] (home)    Best Time: any    Can we leave a detailed message on this number? YES    Call taken on 7/3/2024 at 2:33 PM by Meryl Hernandez

## 2024-07-03 NOTE — PROGRESS NOTES
ANTICOAGULATION MANAGEMENT     Fausto Farr 84 year old male is on warfarin with supratherapeutic INR result. (Goal INR 2.5-3.5)    Recent labs: (last 7 days)     07/03/24  0937   INR 4.0*       ASSESSMENT     Source(s): Chart Review  Previous INR was Therapeutic last 2(+) visits  Medication, diet, health changes since last INR   6/26/24 telephone note has that patient will have an ablation on his back, no date, will he need to hold his warfarin?         PLAN     Unable to reach Ralph today.    Left messages on home and cell phone to take reduced dose of warfarin, 2.5 mg tonight, then back to 5 mg warfarin 7/4/24. Request call back for assessment. Left message to call [phone].      Follow up required to confirm warfarin dose taken and assess for changes and discuss out of range result     Cece Rios RN  Anticoagulation Clinic  7/3/2024

## 2024-07-05 NOTE — TELEPHONE ENCOUNTER
Called returned to patient, see 7/3/24 Anticoagulation encounter for warfarin dosing instruction.  Cece Rios RN, BSN  Anticoagulation Clinic

## 2024-07-05 NOTE — PROGRESS NOTES
ANTICOAGULATION MANAGEMENT     Fausto Farr 84 year old male is on warfarin with supratherapeutic INR result. (Goal INR 2.5-3.5)    Recent labs: (last 7 days)     07/03/24  0937   INR 4.0*       ASSESSMENT     Source(s): Chart Review and Patient/Caregiver Call     Warfarin doses taken: Warfarin taken as instructed  Diet: No new diet changes identified  Medication/supplement changes: None noted  New illness, injury, or hospitalization: No  Signs or symptoms of bleeding or clotting: No  Previous result: Therapeutic last 2(+) visits  Additional findings: Upcoming surgery/procedure Patient reports upcoming back ablation, no date scheduled yet, will have an injection 7/16/24 to see if patient will actually have an ablation  Patient will call to see if he will need to hold his warfarin for 7/16/24       PLAN     Recommended plan for no diet, medication or health factor changes affecting INR     Dosing Instructions: partial hold then continue your current warfarin dose with next INR in 1-2 weeks. Patient reports he did receive message to take partial warfarin dose 7/3/24.       Summary  As of 7/3/2024      Full warfarin instructions:  7/3: 2.5 mg; Otherwise 5 mg every day   Next INR check:  7/11/2024               Telephone call with Ralph who verbalizes understanding and agrees to plan    Lab visit scheduled    Education provided: Please call back if any changes to your diet, medications or how you've been taking warfarin  Importance of notifying anticoagulation clinic for: upcoming surgeries and procedures 2 weeks in advance  Contact [phone] with any changes, questions or concerns.     Plan made per [facility] anticoagulation protocol    Cece Rios RN  Anticoagulation Clinic  7/5/2024    _______________________________________________________________________     Anticoagulation Episode Summary       Current INR goal:  2.5-3.5   TTR:  47.4% (1 y)   Target end date:  Indefinite   Send INR reminders to:  SHANNA  DELMER    Indications    S/P mitral valve replacement [Z95.2]  Long term current use of anticoagulant therapy [Z79.01]  Longstanding persistent atrial fibrillation (H) [I48.11]             Comments:  Per 9/20/22 Cardio Note strict INR goal of 2.5-3.5. If INR falls below 2.5 at any time, needs to be bridged with Lovenox. consent to leave DVM              Anticoagulation Care Providers       Provider Role Specialty Phone number    Jodi Flower Mai, MD Referring Internal Medicine - Pediatrics [phone]

## 2024-07-11 ENCOUNTER — ANTICOAGULATION THERAPY VISIT (OUTPATIENT)
Dept: ANTICOAGULATION | Facility: CLINIC | Age: 84
End: 2024-07-11

## 2024-07-11 ENCOUNTER — LAB (OUTPATIENT)
Dept: LAB | Facility: CLINIC | Age: 84
End: 2024-07-11
Payer: MEDICARE

## 2024-07-11 DIAGNOSIS — Z95.2 S/P AORTIC VALVE REPLACEMENT: ICD-10-CM

## 2024-07-11 DIAGNOSIS — I48.11 LONGSTANDING PERSISTENT ATRIAL FIBRILLATION (H): ICD-10-CM

## 2024-07-11 DIAGNOSIS — Z12.5 SCREENING FOR PROSTATE CANCER: ICD-10-CM

## 2024-07-11 DIAGNOSIS — Z95.2 S/P MITRAL VALVE REPLACEMENT: Primary | ICD-10-CM

## 2024-07-11 DIAGNOSIS — Z79.01 LONG TERM CURRENT USE OF ANTICOAGULANT THERAPY: ICD-10-CM

## 2024-07-11 DIAGNOSIS — E78.2 MIXED HYPERLIPIDEMIA: Primary | ICD-10-CM

## 2024-07-11 DIAGNOSIS — Z95.2 S/P MITRAL VALVE REPLACEMENT: ICD-10-CM

## 2024-07-11 DIAGNOSIS — I25.10 CORONARY ARTERY DISEASE: ICD-10-CM

## 2024-07-11 LAB — INR BLD: 1.6 (ref 0.9–1.1)

## 2024-07-11 PROCEDURE — 85610 PROTHROMBIN TIME: CPT

## 2024-07-11 PROCEDURE — 36416 COLLJ CAPILLARY BLOOD SPEC: CPT

## 2024-07-11 NOTE — PROGRESS NOTES
ANTICOAGULATION MANAGEMENT          PLAN     Recommended plan for no diet, medication or health factor changes affecting INR.       Dosing Instructions: booster dose then continue your current warfarin dose with next INR in 1 week       Summary  As of 7/11/2024      Full warfarin instructions:  7/11: 10 mg; Otherwise 5 mg every day   Next INR check:  7/18/2024               Telephone call with Ralph who agrees to plan and repeated back plan correctly    Lab visit scheduled    Education provided: Please call back if any changes to your diet, medications or how you've been taking warfarin  Goal range and lab monitoring: goal range and significance of current result  Lovenox/Heparin education provided: role of enoxaparin/heparin in bridge therapy.  Patient prefers not to use Lovenox.     Plan made with [facility] Pharmacist Flaquita Sanders RN  Anticoagulation Clinic  7/11/2024    _______________________________________________________________________     Anticoagulation Episode Summary       Current INR goal:  2.5-3.5   TTR:  48.1% (1 y)   Target end date:  Indefinite   Send INR reminders to:  SHANNA DOWELL    Indications    S/P mitral valve replacement [Z95.2]  Long term current use of anticoagulant therapy [Z79.01]  Longstanding persistent atrial fibrillation (H) [I48.11]             Comments:  Per 9/20/22 Cardio Note strict INR goal of 2.5-3.5. If INR falls below 2.5 at any time, needs to be bridged with Lovenox. consent to leave DVM              Anticoagulation Care Providers       Provider Role Specialty Phone number    Jodi Flower Mai, MD Referring Internal Medicine - Pediatrics [phone]

## 2024-07-11 NOTE — PROGRESS NOTES
ANTICOAGULATION MANAGEMENT     Fausto Farr 84 year old male is on warfarin with subtherapeutic INR result. (Goal INR 2.5-3.5)    Recent labs: (last 7 days)     07/11/24  1008   INR 1.6*       ASSESSMENT     Source(s): Chart Review and Patient/Caregiver Call     Warfarin doses taken: Warfarin taken as instructed  Diet: No new diet changes identified  Medication/supplement changes: stopped taking Tylenol for back pain.  Previously taking 2000 mg daily.  New illness, injury, or hospitalization: No  Signs or symptoms of bleeding or clotting: No  Previous result: Supratherapeutic  Additional findings: injection scheduled for his back on 7/16 with [facility].  Per patient, his instructions for the injection state that he does not need to stop his anticoagulants, but INR would need to be in range (not supra therapeutic).       PLAN     Briefly spoke with Ralph for assessment.  Consulted with Flaquita Rios [facility] since patient has upcoming injection, and INR has been variable.  Lovenox has also been recommended for him in the past when INR is <2.5.  She recommends a boost dose today of 10 mg.    Mentioned Lovenox injections to patient while doing assessment.  He was not interested in doing injections at this time.  Reviewed with him that he is at a higher risk for blood clots around his heart valve without the Lovenox.    Attempted to call patient to discuss warfarin dosing and next INR check, but he was not available at the time of the call.    Follow up required to discuss dosing instructions and confirm understanding of instructions    Ruthann Sanders RN  Anticoagulation Clinic  7/11/2024

## 2024-07-11 NOTE — PROGRESS NOTES
ANTICOAGULATION MANAGEMENT        PLAN     Unable to reach Ralph x2 today.    Left message to take a booster dose of warfarin,  10 mg tonight. Request call back for assessment.    Follow up required to discuss dosing instructions and confirm understanding of instructions    Ruthann Sanders RN  Anticoagulation Clinic  7/11/2024

## 2024-07-17 ENCOUNTER — ANTICOAGULATION THERAPY VISIT (OUTPATIENT)
Dept: ANTICOAGULATION | Facility: CLINIC | Age: 84
End: 2024-07-17

## 2024-07-17 ENCOUNTER — LAB (OUTPATIENT)
Dept: LAB | Facility: CLINIC | Age: 84
End: 2024-07-17
Payer: MEDICARE

## 2024-07-17 DIAGNOSIS — Z95.2 S/P AORTIC VALVE REPLACEMENT: ICD-10-CM

## 2024-07-17 DIAGNOSIS — I10 ESSENTIAL HYPERTENSION, BENIGN: Primary | ICD-10-CM

## 2024-07-17 DIAGNOSIS — Z79.01 LONG TERM CURRENT USE OF ANTICOAGULANT THERAPY: ICD-10-CM

## 2024-07-17 DIAGNOSIS — Z95.2 S/P MITRAL VALVE REPLACEMENT: Primary | ICD-10-CM

## 2024-07-17 DIAGNOSIS — Z95.2 S/P MITRAL VALVE REPLACEMENT: ICD-10-CM

## 2024-07-17 DIAGNOSIS — I48.11 LONGSTANDING PERSISTENT ATRIAL FIBRILLATION (H): ICD-10-CM

## 2024-07-17 LAB — INR BLD: 2.4 (ref 0.9–1.1)

## 2024-07-17 PROCEDURE — 36416 COLLJ CAPILLARY BLOOD SPEC: CPT

## 2024-07-17 PROCEDURE — 85610 PROTHROMBIN TIME: CPT

## 2024-07-17 NOTE — PROGRESS NOTES
ANTICOAGULATION MANAGEMENT     Fausto Farr 84 year old male is on warfarin with subtherapeutic INR result. (Goal INR 2.5-3.5)    Recent labs: (last 7 days)     07/17/24  1320   INR 2.4*       ASSESSMENT     Source(s): Chart Review and Patient/Caregiver Call     Warfarin doses taken: Warfarin taken as instructed  Diet: No new diet changes identified  Medication/supplement changes: None noted  New illness, injury, or hospitalization: No  Signs or symptoms of bleeding or clotting: No  Previous result: Subtherapeutic  Additional findings: had an injection in his back yesterday.  Will have 1 more on 7/23 then will determine if an ablation will need to be done.       PLAN     Recommended plan for no diet, medication or health factor changes affecting INR     Dosing Instructions: Increase your warfarin dose (7% change) with next INR in 1 week       Summary  As of 7/17/2024      Full warfarin instructions:  7.5 mg every Wed; 5 mg all other days   Next INR check:  7/25/2024               Telephone call with Ralph who agrees to plan and repeated back plan correctly    Lab visit scheduled    Education provided: Please call back if any changes to your diet, medications or how you've been taking warfarin    Plan made per [facility] anticoagulation protocol    Ruthann Sanders RN  Anticoagulation Clinic  7/17/2024    _______________________________________________________________________     Anticoagulation Episode Summary       Current INR goal:  2.5-3.5   TTR:  48.1% (1 y)   Target end date:  Indefinite   Send INR reminders to:  SHANNA DOWELL    Indications    S/P mitral valve replacement [Z95.2]  Long term current use of anticoagulant therapy [Z79.01]  Longstanding persistent atrial fibrillation (H) [I48.11]             Comments:  Per 9/20/22 Cardio Note strict INR goal of 2.5-3.5. If INR falls below 2.5 at any time, needs to be bridged with Lovenox. consent to leave [facility]              Anticoagulation Care Providers       Provider  Role Specialty Phone number    Jodi Flower Mai, MD Referring Internal Medicine - Pediatrics [phone]

## 2024-07-24 ENCOUNTER — TRANSFERRED RECORDS (OUTPATIENT)
Dept: HEALTH INFORMATION MANAGEMENT | Facility: CLINIC | Age: 84
End: 2024-07-24
Payer: MEDICARE

## 2024-07-25 ENCOUNTER — ANTICOAGULATION THERAPY VISIT (OUTPATIENT)
Dept: ANTICOAGULATION | Facility: CLINIC | Age: 84
End: 2024-07-25

## 2024-07-25 ENCOUNTER — LAB (OUTPATIENT)
Dept: LAB | Facility: CLINIC | Age: 84
End: 2024-07-25
Payer: MEDICARE

## 2024-07-25 DIAGNOSIS — Z12.5 SCREENING FOR PROSTATE CANCER: ICD-10-CM

## 2024-07-25 DIAGNOSIS — E78.2 MIXED HYPERLIPIDEMIA: ICD-10-CM

## 2024-07-25 DIAGNOSIS — Z95.2 S/P MITRAL VALVE REPLACEMENT: ICD-10-CM

## 2024-07-25 DIAGNOSIS — Z95.2 S/P AORTIC VALVE REPLACEMENT: ICD-10-CM

## 2024-07-25 DIAGNOSIS — I25.10 CORONARY ARTERY DISEASE: ICD-10-CM

## 2024-07-25 DIAGNOSIS — I10 ESSENTIAL HYPERTENSION, BENIGN: ICD-10-CM

## 2024-07-25 LAB
ALT SERPL W P-5'-P-CCNC: 28 U/L (ref 0–70)
ANION GAP SERPL CALCULATED.3IONS-SCNC: 9 MMOL/L (ref 7–15)
BUN SERPL-MCNC: 14 MG/DL (ref 8–23)
CALCIUM SERPL-MCNC: 9.4 MG/DL (ref 8.8–10.4)
CHLORIDE SERPL-SCNC: 104 MMOL/L (ref 98–107)
CHOLEST SERPL-MCNC: 126 MG/DL
CREAT SERPL-MCNC: 0.85 MG/DL (ref 0.67–1.17)
EGFRCR SERPLBLD CKD-EPI 2021: 86 ML/MIN/1.73M2
FASTING STATUS PATIENT QL REPORTED: NO
FASTING STATUS PATIENT QL REPORTED: NO
GLUCOSE SERPL-MCNC: 102 MG/DL (ref 70–99)
HCO3 SERPL-SCNC: 24 MMOL/L (ref 22–29)
HDLC SERPL-MCNC: 28 MG/DL
INR BLD: 2.4 (ref 0.9–1.1)
LDLC SERPL CALC-MCNC: 66 MG/DL
NONHDLC SERPL-MCNC: 98 MG/DL
POTASSIUM SERPL-SCNC: 4.4 MMOL/L (ref 3.4–5.3)
SODIUM SERPL-SCNC: 137 MMOL/L (ref 135–145)
TRIGL SERPL-MCNC: 162 MG/DL

## 2024-07-25 PROCEDURE — 84460 ALANINE AMINO (ALT) (SGPT): CPT

## 2024-07-25 PROCEDURE — 80048 BASIC METABOLIC PNL TOTAL CA: CPT

## 2024-07-25 PROCEDURE — 36415 COLL VENOUS BLD VENIPUNCTURE: CPT

## 2024-07-25 PROCEDURE — G0103 PSA SCREENING: HCPCS

## 2024-07-25 PROCEDURE — 80061 LIPID PANEL: CPT

## 2024-07-25 PROCEDURE — 85610 PROTHROMBIN TIME: CPT

## 2024-07-25 PROCEDURE — 36416 COLLJ CAPILLARY BLOOD SPEC: CPT

## 2024-07-25 NOTE — PROGRESS NOTES
ANTICOAGULATION MANAGEMENT     Fausto Farr 84 year old male is on warfarin with subtherapeutic INR result. (Goal INR 2.5-3.5)    Recent labs: (last 7 days)     07/25/24  1047   INR 2.4*       ASSESSMENT     Source(s): Chart Review and Patient/Caregiver Call     Warfarin doses taken: Warfarin taken as instructed  Diet: No new diet changes identified  Medication/supplement changes: None noted  New illness, injury, or hospitalization: No  Signs or symptoms of bleeding or clotting: No  Previous result: Subtherapeutic  Additional findings: Pt took 7.5 mg on Wednesdays and 5 mg all other days as directed but his INR is still 2.4 today like it was last week. Will try 7.5 mg twice a week and 5 mg all other days.       PLAN     Recommended plan for ongoing change(s) affecting INR     Dosing Instructions: Increase your warfarin dose (6.7% change) with next INR in 10 days       Summary  As of 7/25/2024      Full warfarin instructions:  7.5 mg every Sun, Thu; 5 mg all other days   Next INR check:  8/5/2024               Telephone call with Ralph who agrees to plan and repeated back plan correctly    Lab visit scheduled    Education provided: Contact [phone] with any changes, questions or concerns.     Plan made per [facility] anticoagulation protocol    Ashley Luu RN  Anticoagulation Clinic  7/25/2024    _______________________________________________________________________     Anticoagulation Episode Summary       Current INR goal:  2.5-3.5   TTR:  48.2% (1 y)   Target end date:  Indefinite   Send INR reminders to:  SHANNA DOWELL    Indications    S/P mitral valve replacement [Z95.2]  Long term current use of anticoagulant therapy [Z79.01]  Longstanding persistent atrial fibrillation (H) [I48.11]             Comments:  Per 9/20/22 Cardio Note strict INR goal of 2.5-3.5. If INR falls below 2.5 at any time, needs to be bridged with Lovenox. consent to leave [facility]              Anticoagulation Care Providers        Provider Role Specialty Phone number    Jodi Flower Mai, MD Referring Internal Medicine - Pediatrics [phone]

## 2024-07-26 LAB — PSA SERPL DL<=0.01 NG/ML-MCNC: <0.01 NG/ML

## 2024-08-05 ENCOUNTER — ANTICOAGULATION THERAPY VISIT (OUTPATIENT)
Dept: ANTICOAGULATION | Facility: CLINIC | Age: 84
End: 2024-08-05

## 2024-08-05 ENCOUNTER — LAB (OUTPATIENT)
Dept: LAB | Facility: CLINIC | Age: 84
End: 2024-08-05
Payer: MEDICARE

## 2024-08-05 DIAGNOSIS — Z95.2 S/P MITRAL VALVE REPLACEMENT: Primary | ICD-10-CM

## 2024-08-05 DIAGNOSIS — Z79.01 LONG TERM CURRENT USE OF ANTICOAGULANT THERAPY: ICD-10-CM

## 2024-08-05 DIAGNOSIS — Z95.2 S/P AORTIC VALVE REPLACEMENT: ICD-10-CM

## 2024-08-05 DIAGNOSIS — I48.11 LONGSTANDING PERSISTENT ATRIAL FIBRILLATION (H): ICD-10-CM

## 2024-08-05 DIAGNOSIS — Z95.2 S/P MITRAL VALVE REPLACEMENT: ICD-10-CM

## 2024-08-05 LAB — INR BLD: 2 (ref 0.9–1.1)

## 2024-08-05 PROCEDURE — 85610 PROTHROMBIN TIME: CPT

## 2024-08-05 PROCEDURE — 36415 COLL VENOUS BLD VENIPUNCTURE: CPT

## 2024-08-05 RX ORDER — ENOXAPARIN SODIUM 100 MG/ML
1 INJECTION SUBCUTANEOUS EVERY 12 HOURS
Qty: 10 ML | Refills: 1 | Status: SHIPPED | OUTPATIENT
Start: 2024-08-05 | End: 2024-09-07

## 2024-08-05 NOTE — PROGRESS NOTES
"ANTICOAGULATION MANAGEMENT     Fausto Farr 84 year old male is on warfarin with subtherapeutic INR result. (Goal INR 2.5-3.5)    Recent labs: (last 7 days)     08/05/24  0837   INR 2.0*       ASSESSMENT     Source(s): Chart Review and Patient/Caregiver Call     Warfarin doses taken: Warfarin taken as instructed  Diet: Ate a large serving of broccoli and had his usual bertha/alcohol intake the past week. Discussion with [facility], these factors   Medication/supplement changes: None noted - denies any changes to medications, vitamin, supplements  New illness, injury, or hospitalization: Ralph is having a medial branch block tomorrow. Said INR needed to be less than 3 but he did not skip any doses to achieve this. Per Ralph's request, today's INR result faxed to [facility] ([phone])  Signs or symptoms of bleeding or clotting: No  Previous result: Subtherapeutic  Additional findings: Per chart review, Ralph needs to bridge when INR < 2.5. He was only willing to do \"a few\" injections so advised return on Thursday rather than Friday as originally indicated. He will have 5 injections over this time (8/5 am, 8/6 pm, 8/7 am/pm, 8/8 am)  New maintenance dose was not reviewed with Ralph today to prevent confusion. He is aware of dosing the next 3 days and lovenox instructions.        PLAN     Recommended plan for ongoing change(s) affecting INR     Dosing Instructions: Increase your warfarin dose (12.5% change) Start bridging with Enoxaparin with next INR in 3 days       Summary  As of 8/5/2024      Full warfarin instructions:  5 mg every Mon, Wed, Fri; 7.5 mg all other days   Next INR check:  8/8/2024               Telephone call with Ralph who verbalizes understanding and agrees to plan    Lab visit scheduled    Education provided: Please call back if any changes to your diet, medications or how you've been taking warfarin  Lovenox/Heparin education provided: role of enoxaparin/heparin in bridge therapy, prescribed dose and " frequency, and recommended injection site and site rotation     Plan made with [facility] Pharmacist Flaquita Rios, RN  Anticoagulation Clinic  8/5/2024    _______________________________________________________________________     Anticoagulation Episode Summary       Current INR goal:  2.5-3.5   TTR:  45.6% (1 y)   Target end date:  Indefinite   Send INR reminders to:  SHANNA DOWELL    Indications    S/P mitral valve replacement [Z95.2]  Long term current use of anticoagulant therapy [Z79.01]  Longstanding persistent atrial fibrillation (H) [I48.11]             Comments:  Per 9/20/22 Cardio Note strict INR goal of 2.5-3.5. If INR falls below 2.5 at any time, needs to be bridged with Lovenox. consent to leave [facility]              Anticoagulation Care Providers       Provider Role Specialty Phone number    Jodi Flower Mai, MD Referring Internal Medicine - Pediatrics [phone]

## 2024-08-05 NOTE — PROGRESS NOTES
Estimated Creatinine Clearance: 68.3 mL/min (based on SCr of 0.85 mg/dL).    BMI 33kg/m2, Weight 91kg as of 06/2024    Since INR result 1.6 NOT bridged 07/11, boost only, but INR has remained now consistently slightly subtherapeutic, advise start Lovenox    Lovenox 90mg Q12H until INR > 2.5, 1 dose Lovenox this AM, none tonight due to injection planned tomorrow, then resume tomorrow evening    Flaquita Rios, PharmD BCACP  Anticoagulation Clinical Pharmacist

## 2024-08-08 ENCOUNTER — TELEPHONE (OUTPATIENT)
Dept: OTHER | Facility: CLINIC | Age: 84
End: 2024-08-08

## 2024-08-08 ENCOUNTER — LAB (OUTPATIENT)
Dept: LAB | Facility: CLINIC | Age: 84
End: 2024-08-08
Payer: MEDICARE

## 2024-08-08 ENCOUNTER — ANTICOAGULATION THERAPY VISIT (OUTPATIENT)
Dept: ANTICOAGULATION | Facility: CLINIC | Age: 84
End: 2024-08-08

## 2024-08-08 DIAGNOSIS — I71.43 INFRARENAL ABDOMINAL AORTIC ANEURYSM (AAA) WITHOUT RUPTURE (H): Primary | ICD-10-CM

## 2024-08-08 DIAGNOSIS — I48.11 LONGSTANDING PERSISTENT ATRIAL FIBRILLATION (H): ICD-10-CM

## 2024-08-08 DIAGNOSIS — Z79.01 LONG TERM CURRENT USE OF ANTICOAGULANT THERAPY: ICD-10-CM

## 2024-08-08 DIAGNOSIS — Z95.2 S/P MITRAL VALVE REPLACEMENT: Primary | ICD-10-CM

## 2024-08-08 DIAGNOSIS — Z95.2 S/P AORTIC VALVE REPLACEMENT: ICD-10-CM

## 2024-08-08 DIAGNOSIS — Z95.2 S/P MITRAL VALVE REPLACEMENT: ICD-10-CM

## 2024-08-08 LAB — INR BLD: 3.1 (ref 0.9–1.1)

## 2024-08-08 PROCEDURE — 36416 COLLJ CAPILLARY BLOOD SPEC: CPT

## 2024-08-08 PROCEDURE — 85610 PROTHROMBIN TIME: CPT

## 2024-08-08 NOTE — PROGRESS NOTES
ANTICOAGULATION MANAGEMENT     Fausto Farr 84 year old male is on warfarin with therapeutic INR result. (Goal INR 2.5-3.5)    Recent labs: (last 7 days)     24  0746   INR 3.1*       ASSESSMENT     Source(s): Chart Review and Patient/Caregiver Call     Warfarin doses taken: Warfarin taken as instructed - confirmed proper dosing the last 3 days  Diet: No new diet changes identified  Medication/supplement changes: None noted  New illness, injury, or hospitalization: No  Signs or symptoms of bleeding or clotting: No  Previous result: Subtherapeutic  Additional findings: Ralph reports his medial branch block went well and [facility] received the fax with INR results. He was also able to  lovenox syringes. Did not take AM dose today with therapeutic INR. He will hold onto this for future usage if not .   Will keep maint dose as is (45 mg/week) though slightly over last 7 day total (42.5 mg). Gave dosing only for the next week and Boris wrote instructions down.        PLAN     Recommended plan for no diet, medication or health factor changes affecting INR     Dosing Instructions: Continue your current warfarin dose Stop bridging with Enoxaparin with next INR in 6 days       Summary  As of 2024      Full warfarin instructions:  5 mg every Mon, Wed, Fri; 7.5 mg all other days   Next INR check:  2024               Telephone call with Ralph who verbalizes understanding and agrees to plan    Lab visit scheduled    Education provided: Please call back if any changes to your diet, medications or how you've been taking warfarin  Lovenox/Heparin education provided: role of enoxaparin/heparin in bridge therapy and disposal of syringes     Plan made per [facility] anticoagulation protocol    Maria Luz Rios RN  Anticoagulation Clinic  2024    _______________________________________________________________________     Anticoagulation Episode Summary       Current INR goal:  2.5-3.5   TTR:  45.2% (1  y)   Target end date:  Indefinite   Send INR reminders to:  SHANNA DOWELL    Indications    S/P mitral valve replacement [Z95.2]  Long term current use of anticoagulant therapy [Z79.01]  Longstanding persistent atrial fibrillation (H) [I48.11]             Comments:  Per 9/20/22 Cardio Note strict INR goal of 2.5-3.5. If INR falls below 2.5 at any time, needs to be bridged with Lovenox. consent to leave [facility]              Anticoagulation Care Providers       Provider Role Specialty Phone number    Jodi Flower Mai, MD Referring Internal Medicine - Pediatrics [phone]

## 2024-08-08 NOTE — TELEPHONE ENCOUNTER
[facility]    Who is the name of the provider?:  ANDERSON OSBORNE   What is the location you see this provider at/preferred location?: [place]  Person calling / Facility: Fausto Farr  Phone number:  [phone] (home)   Nurse call back needed:  NA     Reason for call:  Patient called to ask if he needs an annual follow up with Dr. Osborne. No orders or active requests for follow up appointments at this time. Please advise and route back to scheduling pool        8/8/2024, 8:27 AM

## 2024-08-09 ENCOUNTER — ANCILLARY PROCEDURE (OUTPATIENT)
Dept: CARDIOLOGY | Facility: CLINIC | Age: 84
End: 2024-08-09
Attending: INTERNAL MEDICINE
Payer: MEDICARE

## 2024-08-09 DIAGNOSIS — Z95.0 CARDIAC PACEMAKER IN SITU: ICD-10-CM

## 2024-08-09 PROCEDURE — 93294 REM INTERROG EVL PM/LDLS PM: CPT | Performed by: INTERNAL MEDICINE

## 2024-08-09 PROCEDURE — 93296 REM INTERROG EVL PM/IDS: CPT | Performed by: INTERNAL MEDICINE

## 2024-08-09 NOTE — TELEPHONE ENCOUNTER
Contacted patient, discussed that in Oct 2023, he had declined follow up.  Discussed we can certainly obtain CTA for EVAR surveillance.  Patient agreed to plan.  Will schedule at [facility] per patient request.  Gave patient number to schedule.  Yesy Rao RN  Interventional Radiology  [phone]  Pager: [phone]

## 2024-08-10 LAB
MDC_IDC_EPISODE_DTM: NORMAL
MDC_IDC_EPISODE_DURATION: 14 S
MDC_IDC_EPISODE_DURATION: 14 S
MDC_IDC_EPISODE_DURATION: 16 S
MDC_IDC_EPISODE_DURATION: 3 S
MDC_IDC_EPISODE_DURATION: 4 S
MDC_IDC_EPISODE_DURATION: 610 S
MDC_IDC_EPISODE_DURATION: 804 S
MDC_IDC_EPISODE_DURATION: NORMAL S
MDC_IDC_EPISODE_ID: NORMAL
MDC_IDC_EPISODE_TYPE: NORMAL
MDC_IDC_EPISODE_TYPE_INDUCED: NO
MDC_IDC_LEAD_CONNECTION_STATUS: NORMAL
MDC_IDC_LEAD_CONNECTION_STATUS: NORMAL
MDC_IDC_LEAD_IMPLANT_DT: NORMAL
MDC_IDC_LEAD_IMPLANT_DT: NORMAL
MDC_IDC_LEAD_LOCATION: NORMAL
MDC_IDC_LEAD_LOCATION: NORMAL
MDC_IDC_LEAD_LOCATION_DETAIL_1: NORMAL
MDC_IDC_LEAD_LOCATION_DETAIL_1: NORMAL
MDC_IDC_LEAD_MFG: NORMAL
MDC_IDC_LEAD_MFG: NORMAL
MDC_IDC_LEAD_MODEL: NORMAL
MDC_IDC_LEAD_MODEL: NORMAL
MDC_IDC_LEAD_POLARITY_TYPE: NORMAL
MDC_IDC_LEAD_POLARITY_TYPE: NORMAL
MDC_IDC_LEAD_SERIAL: NORMAL
MDC_IDC_LEAD_SERIAL: NORMAL
MDC_IDC_MSMT_BATTERY_DTM: NORMAL
MDC_IDC_MSMT_BATTERY_REMAINING_LONGEVITY: 114 MO
MDC_IDC_MSMT_BATTERY_REMAINING_PERCENTAGE: 100 %
MDC_IDC_MSMT_BATTERY_STATUS: NORMAL
MDC_IDC_MSMT_LEADCHNL_RA_IMPEDANCE_VALUE: 684 OHM
MDC_IDC_MSMT_LEADCHNL_RV_IMPEDANCE_VALUE: 583 OHM
MDC_IDC_MSMT_LEADCHNL_RV_PACING_THRESHOLD_AMPLITUDE: 0.8 V
MDC_IDC_MSMT_LEADCHNL_RV_PACING_THRESHOLD_PULSEWIDTH: 0.4 MS
MDC_IDC_PG_IMPLANT_DTM: NORMAL
MDC_IDC_PG_MFG: NORMAL
MDC_IDC_PG_MODEL: NORMAL
MDC_IDC_PG_SERIAL: NORMAL
MDC_IDC_PG_TYPE: NORMAL
MDC_IDC_SESS_CLINIC_NAME: NORMAL
MDC_IDC_SESS_DTM: NORMAL
MDC_IDC_SESS_TYPE: NORMAL
MDC_IDC_SET_BRADY_AT_MODE_SWITCH_MODE: NORMAL
MDC_IDC_SET_BRADY_AT_MODE_SWITCH_RATE: 170 {BEATS}/MIN
MDC_IDC_SET_BRADY_LOWRATE: 60 {BEATS}/MIN
MDC_IDC_SET_BRADY_MAX_SENSOR_RATE: 130 {BEATS}/MIN
MDC_IDC_SET_BRADY_MAX_TRACKING_RATE: 130 {BEATS}/MIN
MDC_IDC_SET_BRADY_MODE: NORMAL
MDC_IDC_SET_BRADY_PAV_DELAY_HIGH: 200 MS
MDC_IDC_SET_BRADY_PAV_DELAY_LOW: 250 MS
MDC_IDC_SET_BRADY_SAV_DELAY_HIGH: 200 MS
MDC_IDC_SET_BRADY_SAV_DELAY_LOW: 250 MS
MDC_IDC_SET_LEADCHNL_RA_PACING_AMPLITUDE: 5 V
MDC_IDC_SET_LEADCHNL_RA_PACING_CAPTURE_MODE: NORMAL
MDC_IDC_SET_LEADCHNL_RA_PACING_POLARITY: NORMAL
MDC_IDC_SET_LEADCHNL_RA_PACING_PULSEWIDTH: 0.4 MS
MDC_IDC_SET_LEADCHNL_RA_SENSING_ADAPTATION_MODE: NORMAL
MDC_IDC_SET_LEADCHNL_RA_SENSING_POLARITY: NORMAL
MDC_IDC_SET_LEADCHNL_RA_SENSING_SENSITIVITY: 0.25 MV
MDC_IDC_SET_LEADCHNL_RV_PACING_AMPLITUDE: 1.3 V
MDC_IDC_SET_LEADCHNL_RV_PACING_CAPTURE_MODE: NORMAL
MDC_IDC_SET_LEADCHNL_RV_PACING_POLARITY: NORMAL
MDC_IDC_SET_LEADCHNL_RV_PACING_PULSEWIDTH: 0.4 MS
MDC_IDC_SET_LEADCHNL_RV_SENSING_ADAPTATION_MODE: NORMAL
MDC_IDC_SET_LEADCHNL_RV_SENSING_POLARITY: NORMAL
MDC_IDC_SET_LEADCHNL_RV_SENSING_SENSITIVITY: 0.6 MV
MDC_IDC_SET_ZONE_DETECTION_INTERVAL: 375 MS
MDC_IDC_SET_ZONE_STATUS: NORMAL
MDC_IDC_SET_ZONE_TYPE: NORMAL
MDC_IDC_SET_ZONE_VENDOR_TYPE: NORMAL
MDC_IDC_STAT_AT_BURDEN_PERCENT: 74 %
MDC_IDC_STAT_AT_DTM_END: NORMAL
MDC_IDC_STAT_AT_DTM_START: NORMAL
MDC_IDC_STAT_BRADY_DTM_END: NORMAL
MDC_IDC_STAT_BRADY_DTM_START: NORMAL
MDC_IDC_STAT_BRADY_RA_PERCENT_PACED: 0 %
MDC_IDC_STAT_BRADY_RV_PERCENT_PACED: 87 %
MDC_IDC_STAT_EPISODE_RECENT_COUNT: 0
MDC_IDC_STAT_EPISODE_RECENT_COUNT: 0
MDC_IDC_STAT_EPISODE_RECENT_COUNT: 3
MDC_IDC_STAT_EPISODE_RECENT_COUNT_DTM_END: NORMAL
MDC_IDC_STAT_EPISODE_RECENT_COUNT_DTM_START: NORMAL
MDC_IDC_STAT_EPISODE_TYPE: NORMAL
MDC_IDC_STAT_EPISODE_VENDOR_TYPE: NORMAL
MDC_IDC_STAT_EPISODE_VENDOR_TYPE: NORMAL

## 2024-08-14 ENCOUNTER — ANTICOAGULATION THERAPY VISIT (OUTPATIENT)
Dept: ANTICOAGULATION | Facility: CLINIC | Age: 84
End: 2024-08-14

## 2024-08-14 ENCOUNTER — LAB (OUTPATIENT)
Dept: LAB | Facility: CLINIC | Age: 84
End: 2024-08-14
Payer: MEDICARE

## 2024-08-14 DIAGNOSIS — Z95.2 S/P MITRAL VALVE REPLACEMENT: ICD-10-CM

## 2024-08-14 DIAGNOSIS — I48.11 LONGSTANDING PERSISTENT ATRIAL FIBRILLATION (H): ICD-10-CM

## 2024-08-14 DIAGNOSIS — Z79.01 LONG TERM CURRENT USE OF ANTICOAGULANT THERAPY: ICD-10-CM

## 2024-08-14 DIAGNOSIS — Z95.2 S/P AORTIC VALVE REPLACEMENT: ICD-10-CM

## 2024-08-14 DIAGNOSIS — Z95.2 S/P MITRAL VALVE REPLACEMENT: Primary | ICD-10-CM

## 2024-08-14 LAB — INR BLD: 2.7 (ref 0.9–1.1)

## 2024-08-14 PROCEDURE — 36416 COLLJ CAPILLARY BLOOD SPEC: CPT

## 2024-08-14 PROCEDURE — 85610 PROTHROMBIN TIME: CPT

## 2024-08-14 NOTE — PROGRESS NOTES
ANTICOAGULATION MANAGEMENT     Fausto Farr 84 year old male is on warfarin with therapeutic INR result. (Goal INR 2.5-3.5)    Recent labs: (last 7 days)     08/14/24  0735   INR 2.7*       ASSESSMENT     Source(s): Chart Review and Patient/Caregiver Call     Warfarin doses taken: Warfarin taken as instructed  Diet: No new diet changes identified  Medication/supplement changes: None noted  New illness, injury, or hospitalization: No  Signs or symptoms of bleeding or clotting: No  Previous result: Therapeutic last visit; previously outside of goal range  Additional findings: None       PLAN     Recommended plan for no diet, medication or health factor changes affecting INR     Dosing Instructions: Continue your current warfarin dose with next INR in 1 week       Summary  As of 8/14/2024      Full warfarin instructions:  5 mg every Mon, Wed, Fri; 7.5 mg all other days   Next INR check:  8/22/2024               Telephone call with Ralph who verbalizes understanding and agrees to plan    Lab visit scheduled    Education provided: Please call back if any changes to your diet, medications or how you've been taking warfarin    Plan made per [facility] anticoagulation protocol    Jame Chadwick RN  Anticoagulation Clinic  8/14/2024    _______________________________________________________________________     Anticoagulation Episode Summary       Current INR goal:  2.5-3.5   TTR:  45.2% (1 y)   Target end date:  Indefinite   Send INR reminders to:  SHANNA DOWELL    Indications    S/P mitral valve replacement [Z95.2]  Long term current use of anticoagulant therapy [Z79.01]  Longstanding persistent atrial fibrillation (H) [I48.11]             Comments:  Per 9/20/22 Cardio Note strict INR goal of 2.5-3.5. If INR falls below 2.5 at any time, needs to be bridged with Lovenox. consent to leave [facility]              Anticoagulation Care Providers       Provider Role Specialty Phone number    Jodi Flower Mai, MD Referring  Internal Medicine - Pediatrics [phone]

## 2024-08-14 NOTE — PROGRESS NOTES
PLAN     Spouse answered, Ralph is outside and will call back when able.       Follow up required to discuss dosing instructions and confirm understanding of instructions    Jame Chadwick RN  Anticoagulation Clinic  8/14/2024

## 2024-08-17 ENCOUNTER — HOSPITAL ENCOUNTER (OUTPATIENT)
Dept: CT IMAGING | Facility: CLINIC | Age: 84
Discharge: HOME | End: 2024-08-17
Attending: RADIOLOGY | Admitting: RADIOLOGY
Payer: MEDICARE

## 2024-08-17 DIAGNOSIS — I71.43 INFRARENAL ABDOMINAL AORTIC ANEURYSM (AAA) WITHOUT RUPTURE (H): ICD-10-CM

## 2024-08-17 PROCEDURE — 74174 CTA ABD&PLVS W/CONTRAST: CPT | Mod: MG

## 2024-08-17 PROCEDURE — 250N000011 HC RX IP 250 OP 636: Performed by: RADIOLOGY

## 2024-08-17 PROCEDURE — 250N000009 HC RX 250: Performed by: RADIOLOGY

## 2024-08-17 RX ORDER — IOPAMIDOL 755 MG/ML
500 INJECTION, SOLUTION INTRAVASCULAR ONCE
Status: COMPLETED | OUTPATIENT
Start: 2024-08-17 | End: 2024-08-17

## 2024-08-17 RX ADMIN — IOPAMIDOL 72 ML: 755 INJECTION, SOLUTION INTRAVENOUS at 12:44

## 2024-08-17 RX ADMIN — SODIUM CHLORIDE 80 ML: 9 INJECTION, SOLUTION INTRAVENOUS at 12:44

## 2024-08-19 ENCOUNTER — TELEPHONE (OUTPATIENT)
Dept: OTHER | Facility: CLINIC | Age: 84
End: 2024-08-19
Payer: MEDICARE

## 2024-08-19 NOTE — TELEPHONE ENCOUNTER
Contacted patient.  Recommended a follow up consult with Dr. Osborne to review CTA findings and recommendations.  Patient agreed to plan.  Yesy Rao RN  IR nurse clinician  [phone]

## 2024-08-22 ENCOUNTER — ANTICOAGULATION THERAPY VISIT (OUTPATIENT)
Dept: ANTICOAGULATION | Facility: CLINIC | Age: 84
End: 2024-08-22

## 2024-08-22 ENCOUNTER — LAB (OUTPATIENT)
Dept: LAB | Facility: CLINIC | Age: 84
End: 2024-08-22
Payer: MEDICARE

## 2024-08-22 DIAGNOSIS — Z79.01 LONG TERM CURRENT USE OF ANTICOAGULANT THERAPY: ICD-10-CM

## 2024-08-22 DIAGNOSIS — E11.9 DIABETES MELLITUS, TYPE 2 (H): Primary | ICD-10-CM

## 2024-08-22 DIAGNOSIS — Z95.2 S/P AORTIC VALVE REPLACEMENT: ICD-10-CM

## 2024-08-22 DIAGNOSIS — I48.11 LONGSTANDING PERSISTENT ATRIAL FIBRILLATION (H): ICD-10-CM

## 2024-08-22 DIAGNOSIS — Z95.2 S/P MITRAL VALVE REPLACEMENT: Primary | ICD-10-CM

## 2024-08-22 DIAGNOSIS — Z95.2 S/P MITRAL VALVE REPLACEMENT: ICD-10-CM

## 2024-08-22 LAB — INR BLD: 2.5 (ref 0.9–1.1)

## 2024-08-22 PROCEDURE — 85610 PROTHROMBIN TIME: CPT

## 2024-08-22 PROCEDURE — 36416 COLLJ CAPILLARY BLOOD SPEC: CPT

## 2024-08-22 NOTE — PROGRESS NOTES
ANTICOAGULATION MANAGEMENT     Fausto Farr 84 year old male is on warfarin with therapeutic INR result. (Goal INR 2.5-3.5)    Recent labs: (last 7 days)     08/22/24  0945   INR 2.5*       ASSESSMENT     Source(s): Chart Review  Previous INR was Therapeutic last 2(+) visits  Medication, diet, health changes since last INR chart reviewed; none identified         PLAN     Recommended plan for no diet, medication or health factor changes affecting INR     Dosing Instructions: Continue your current warfarin dose with next INR in 2 weeks       Summary  As of 8/22/2024      Full warfarin instructions:  5 mg every Mon, Wed, Fri; 7.5 mg all other days   Next INR check:  9/5/2024               Detailed voice message left for Ralph with dosing instructions and follow up date.   Sent Dragon Ports message with dosing and follow up instructions    Contact [phone] to schedule and with any changes, questions or concerns.     Education provided: Please call back if any changes to your diet, medications or how you've been taking warfarin    Plan made per [facility] anticoagulation protocol    Ruthann Sanders RN  Anticoagulation Clinic  8/22/2024    _______________________________________________________________________     Anticoagulation Episode Summary       Current INR goal:  2.5-3.5   TTR:  45.2% (1 y)   Target end date:  Indefinite   Send INR reminders to:  SHANNA DOWELL    Indications    S/P mitral valve replacement [Z95.2]  Long term current use of anticoagulant therapy [Z79.01]  Longstanding persistent atrial fibrillation (H) [I48.11]             Comments:  Per 9/20/22 Cardio Note strict INR goal of 2.5-3.5. If INR falls below 2.5 at any time, needs to be bridged with Lovenox. consent to leave [facility]              Anticoagulation Care Providers       Provider Role Specialty Phone number    Jodi Flower Mai, MD Referring Internal Medicine - Pediatrics [phone]

## 2024-08-26 ENCOUNTER — VIRTUAL VISIT (OUTPATIENT)
Dept: OTHER | Facility: CLINIC | Age: 84
End: 2024-08-26
Attending: RADIOLOGY
Payer: MEDICARE

## 2024-08-26 DIAGNOSIS — I71.43 INFRARENAL ABDOMINAL AORTIC ANEURYSM (AAA) WITHOUT RUPTURE (H): Primary | ICD-10-CM

## 2024-08-26 NOTE — PROGRESS NOTES
Ralph is a 84 year old who is being evaluated via a billable telephone visit.    What phone number would you like to be contacted at?   [phone]          How would you like to obtain your AVS? Nohemi Barth MA

## 2024-08-27 NOTE — PROGRESS NOTES
VASCULAR OUTPATIENT CONSULT OR VISIT  PHYSICIAN:  Dr. Johnnie Osborne      LOCATION: [facility]  This visit is being conducted as a virtual visit due to the emphasis on mitigation of the COVID 19 virus pandemic.  The clinician has decided that the risk of an in office visit outweighs the benefit for this patient.       Fausto Farr   Medical Record #:  [id number]  YOB: 1940  Age:  84 year old     Date of Service: 8/26/2024    PRIMARY CARE PROVIDER: Jodi Flower Mai      HPI:  Fausto Farr is a 84 year old male who is being seen today to discuss results of the recent CTA abdomen pelvis with contrast that was performed on 8/17/2024.  The patient has a history of EVAR, known Type II endoleak.   Comparison study was done 10/13/2022.  The patient's wife is present via phone for the visit as well.   He states, he is feeling well.  He did have a period of weight loss that was due to recovering from back surgery and he wasn't feeling well enough to eat.  Currently, he is gaining weight.  He denies post prandial pain.  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     PHH:    Past Medical History:   Diagnosis Date    Abdominal pain     Anemia     currently taking iron    Back pain     since 1980    BPH (benign prostatic hyperplasia)     Bruit     CAD (coronary artery disease)     Cellulitis 10/18/2012    Cellulitis 05/2018    GrpB strep LLE cellulitis  negative RACHAEL for veg    Chronic venous insufficiency     bilat lower extremities    Contact dermatitis and other eczema, due to unspecified cause     Diaphragmatic hernia without mention of obstruction or gangrene     Diastolic HF (heart failure) (H)     Gastric ulcer     Hypertension 08/06/2013    Lumbago     Mesenteric artery insufficiency (H24)     known AAA and narrowing of intestinal artery's  followed by dr raymond    Metabolic syndrome     Mitral valve disease      [facility]    Nonallopathic lesion of lumbar region     Nonallopathic lesion of rib cage     Nonallopathic lesion of sacral region     GAGE (obstructive sleep apnea)     CPAP    Paroxysmal atrial fibrillation (H) 10/18/2012    occured after stopping BB  (prior  aflutter ablation)    Prostate cancer (H) 2008    radiation seed, XRT     PVD (peripheral vascular disease) (H24)     Rotator cuff strain     and sprain    S/P AAA repair     S/P aortic valve replacement 2006    developed perivalve leak and MS, therefore redo surg 2013    S/P CABG (coronary artery bypass graft) 2006    Lima-Lad, RA-Rca    Sciatica of left side     since 2000    Sepsis due to group B Streptococcus (H) 05/19/2018    TIA (transient ischemic attack) 08/01/2022    Total knee replacement status 07/22/2019    Ulcerative colitis (H)     Varicose veins of bilateral lower extremities with other complications     s/p RLE vein stripping    Vitamin D deficiency         Past Surgical History:   Procedure Laterality Date    AORTIC VALVE REPLACEMENT  1/3/06    redo AVR SJM 21mm and SJM MVR 27mm in 2013SJ 21([id number]):AVR, SJM 27 :MVR-    APPLY WOUND VAC N/A 11/12/2019    Procedure: APPLICATION, WOUND VAC;  Surgeon: Sara Martinez MD;  Location: [facility] OR    ARTHROPLASTY KNEE      right knee    ARTHROPLASTY KNEE Right 7/22/2019    Procedure: Right total knee arthroplasty;  Surgeon: Prasanth Jensen MD;  Location: [facility] OR    BACK SURGERY  Oct 2015    Fusion L4-5, laminectomy L2, L3    BYPASS GRAFT ARTERY CORONARY  10/2013    reimplantation of radial artery graft to RCA    CARDIAC CATHERIZATION  11/2005    Stent placed to RCA    CARDIAC CATHERIZATION  04/2013    Occluded RCA, patent LIMA to LAD and radial graft to PDA    CARPAL TUNNEL RELEASE RT/LT  1994    COLONOSCOPY  8-22-11    CYSTOSCOPY FLEXIBLE  10/16/2013    Procedure: CYSTOSCOPY FLEXIBLE;  FLEXIBLE CYSTOSCOPY / DILATION OF URETHRA / INSERTION OF LESLIE;  Surgeon:  Cooper Wallace MD;  Location:  OR    ENDOVASCULAR REPAIR, INFRARENAL ABDOMINAL AORTIC ANEURYSM/DISSECTION; MODULAR BIFURCATED PROSTHESIS  2006    AAA repair endovascular    ENT SURGERY      EP ABLATION ATRIAL FLUTTER N/A 4/22/2019    Procedure: EP Ablation Atrial Flutter;  Surgeon: Jessy Vasquez MD;  Location:  HEART CARDIAC CATH LAB    EP PACEMAKER DEVICE & LEAD IMPLANT- RIGHT ATRIAL & RIGHT VENTRICULAR N/A 8/2/2022    Procedure: Pacemaker Device & Lead Implant - Right Atrial & Right Ventricular;  Surgeon: Jessy Vasquez MD;  Location:  HEART CARDIAC CATH LAB    GENITOURINARY SURGERY  6/16/08    radioactive seeding    GRAFT FLAP PEDICLE EXTREMITY (LOCATION) Right 11/12/2019    Procedure: SURGICAL PROCUREMENT, FLAP, PEDICLE, EXTREMITY;  Surgeon: Sara Martinez MD;  Location:  OR    GRAFT SKIN SPLIT THICKNESS FROM EXTREMITY Right 11/12/2019    Procedure: RIGHT GASTRONEMIUS FLAP TO RIGHT KNEE, SPLIT THICKNESS SKIN GRAFT FROM RIGHT THIGH TO RIGHT KNEE, SURGICAL PROCUREMENT, FLAP, PEDICLE, EXTREMITY, WOUND VAC PLACEMENT;  Surgeon: Sara Martinez MD;  Location:  OR    HEAD & NECK SURGERY  1997    vocal cord polypectomy    INCISION AND DRAINAGE KNEE, COMBINED Right 8/29/2019    Procedure: INCISION AND DRAINAGE, KNEE W/ Secondary Wound Closure;  Surgeon: Prasanth Jensen MD;  Location:  OR    IRRIGATION AND DEBRIDEMENT KNEE, PLACE ANTIBIOTIC CEMENT BEADS / SPACE Right 2/12/2020    Procedure: 1. Irrigation and debridement of wound breakdown, right total knee arthroplasty.  2. Irrigation and debridement of right total knee with placement of antibiotic-impregnated (vancomycin) absorbable antibiotic beads.;  Surgeon: Prasanth Jensen MD;  Location:  OR    IRRIGATION AND DEBRIDEMENT LOWER EXTREMITY, COMBINED Right 12/8/2019    Procedure: DEBRIDEMENT OF RIGHT CALF AND WOUND CLOSURE.;  Surgeon: Sara Martinez MD;  Location:  OR    IRRIGATION AND  DEBRIDEMENT UPPER EXTREMITY, COMBINED Right 1/7/2023    Procedure: Right elbow arthrotomy, irrigation and debridement;  Surgeon: Jose A Christine MD;  Location: [facility] OR    KNEE SURGERY  2001 Right knee arthroscopy    OPTICAL TRACKING SYSTEM FUSION SPINE POSTERIOR LUMBAR THREE+ LEVELS N/A 10/29/2015    Procedure: OPTICAL TRACKING SYSTEM FUSION SPINE POSTERIOR LUMBAR THREE+ LEVELS;  Surgeon: Walt Garcia MD;  Location: [facility] OR    PROSTATE SURGERY      radioactive seeding 6/16/08    REPAIR ANEURYSM ABDOMINAL AORTA  06/08    REPAIR VALVE MITRAL  10/16/2013    SJM 21([id number]):AVR, SJM 27 :MVRProcedure: REPAIR VALVE MITRAL;  REDO STERNOTOMY/REDO AORTIC VALVE REPLACEMENT/ MITRAL VALVE REPLACEMENT/REIMPLANTATION OF RIGHT CORONARY ARTERY BYPASS WITH RACHAEL ( ON PUMP);  Surgeon: Viet Singh MD;  Location:  OR    REPLACE VALVE AORTIC  10/16/2013    Procedure: REPLACE VALVE AORTIC;;  Surgeon: Viet Singh MD;  Location: [facility] OR    SURGERY GENERAL IP CONSULT  05/2008 Excision aneurysm abdominal aorta    SURGERY GENERAL IP CONSULT  1997 Vocal cord polypectomy    VASCULAR SURGERY  1968, 1993     varicose vein stripping    ZZC CABG, VEIN, TWO  1/3/06    Left radial to RCA, LIMA to LAD (RA to RCA reimplanted at time of 2013 surg)       ALLERGIES:  Bees    MEDS:    Current Outpatient Medications:     acetaminophen (TYLENOL) 325 MG tablet, Take 2 tablets (650 mg) by mouth every 6 hours as needed for mild pain or other (and adjunct with moderate or severe pain or per patient request), Disp: 100 tablet, Rfl: 0    amoxicillin-clavulanate (AUGMENTIN) 875-125 MG tablet, Take 1 tablet by mouth daily (Patient not taking: Reported on 6/14/2024), Disp: , Rfl:     betamethasone valerate (VALISONE) 0.1 % external lotion, Apply topically 2 times daily Apply topically to scalp twice daily PRN, Disp: 60 mL, Rfl: 3    cholecalciferol 25 MCG (1000 UT) TABS, Take 1,000 Units by mouth daily, Disp: , Rfl:     enoxaparin  ANTICOAGULANT (LOVENOX) 100 MG/ML syringe, Inject 0.9 mLs (90 mg) subcutaneously every 12 hours, Disp: 10 mL, Rfl: 1    ezetimibe (ZETIA) 10 MG tablet, Take 1 tablet (10 mg) by mouth daily, Disp: 100 tablet, Rfl: 3    ferrous sulfate (FEROSUL) 325 (65 Fe) MG tablet, Take 325 mg by mouth daily (with breakfast), Disp: , Rfl:     fluocinonide (LIDEX) 0.05 % external ointment, Apply topically 2 times daily, Disp: 60 g, Rfl: 11    glucosamine-chondroitin 500-400 MG CAPS per capsule, Take 1 capsule by mouth every evening, Disp: , Rfl:     mesalamine (ASACOL HD) 800 MG EC tablet, Take 1 tablet (800 mg) by mouth 2 times daily, Disp: 200 tablet, Rfl: 3    metoprolol succinate ER (TOPROL XL) 50 MG 24 hr tablet, Take 1 1/2 tab (75mg) daily, Disp: 135 tablet, Rfl: 3    rosuvastatin (CRESTOR) 40 MG tablet, Take 1 tablet (40 mg) by mouth daily, Disp: 100 tablet, Rfl: 3    tamsulosin (FLOMAX) 0.4 MG capsule, Take 1 capsule (0.4 mg) by mouth daily, Disp: 90 capsule, Rfl: 3    triamcinolone (KENALOG) 0.1 % external cream, Apply topically 2 times daily, Disp: 85.2 g, Rfl: 1    warfarin ANTICOAGULANT (COUMADIN) 5 MG tablet, Take 1 tablet (5mg) everyday OR per INR clinic, Disp: 90 tablet, Rfl: 1    SOCIAL HABITS:    History   Smoking Status    Former    Types: Cigarettes   Smokeless Tobacco    Never     Social History    Substance and Sexual Activity      Alcohol use: Yes        Comment: a couple beers per week (socially)      History   Drug Use No       FAMILY HISTORY:    Family History   Problem Relation Age of Onset    No Known Problems Mother     Coronary Artery Disease Father         CABG    Heart Disease Father         Pacemaker    No Known Problems Brother     No Known Problems Sister     No Known Problems Son     Other Cancer Daughter     No Known Problems Daughter     Heart Disease Brother     Other - See Comments Grandchild        PE:  There were no vitals taken for this visit.  Wt Readings from Last 1 Encounters:   06/14/24  200 lb 14.4 oz (91.1 kg)     There is no height or weight on file to calculate BMI.          DIAGNOSTIC STUDIES:     Images:  CTA Abdomen Pelvis with Contrast    Result Date: 8/18/2024  EXAM: CTA ABDOMEN PELVIS WITH CONTRAST LOCATION: [facility] DATE: 8/17/2024 INDICATION: h o EVAR, Type II endoleak, comparison study done 10 13 2022.  Patient had declined previous follow up COMPARISON: 10/13/2022 TECHNIQUE: CT angiogram abdomen pelvis during arterial phase of injection of IV contrast. 2D and 3D MIP reconstructions were performed by the CT technologist. Dose reduction techniques were used. CONTRAST: 72mL Isovue 370 FINDINGS: ANGIOGRAM ABDOMEN/PELVIS: High-grade stenosis celiac and superior mesenteric arteries. Proximal occlusion of the [place] with distal perfusion. Arteries with no evidence of renal artery stenosis. Endovascular stent graft repair of infrarenal abdominal aortic  aneurysm. Iliac limbs extend into the common iliac arteries bilaterally. Aneurysm sac measures 7.3 x 7.3 cm which is unchanged in comparison to previous study. Late type II endoleak identified on delayed imaging along the right posterior lateral aspect of the distal aneurysm sac. The endoleak appears decrease in size in comparison to previous study. Right external iliac and right internal iliac arteries are patent. 1.7 cm aneurysm left internal iliac artery. No significant change in comparison to previous study. Common femoral, proximal profunda femoral and proximal superficial femoral arteries are patent bilaterally. Left external iliac artery patent. LOWER CHEST: Aortic and mitral valve replacement. Pacemaker leads. Sternotomy. Bibasilar emphysematous changes HEPATOBILIARY: Gallbladder sludge/calculi. No hepatic masses PANCREAS: Pancreatic atrophy. Coarse pancreatic calcifications suggesting chronic pancreatitis SPLEEN: Normal. ADRENAL GLANDS: Normal. KIDNEYS/BLADDER: Normal. BOWEL: Normal. LYMPH NODES: Normal. PELVIC  ORGANS: Prostatic seeds MUSCULOSKELETAL: Severe degenerative changes of the lower thoracic spine. Posterior fixation L4-5     IMPRESSION: 1.  Endovascular stent graft repair of infrarenal abdominal aortic aneurysm. Stable aneurysm sac size with delayed type II endoleak identified. The endoleak appears decrease in comparison to previous study. 2.  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 3.  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    Cardiac Device Check - Remote    Result Date: 8/10/2024  [employer] Accolade (D) Remote PPM Device Check AP: 0% : 87% Mode: DDD  Presenting Rhythm: AFib/AFlutter w  Historical underlying rhythm: AFl w/ CHB, JE at 43-44bpm  Heart Rate: adequate rates per histogram, mostly in the 70s Sensing: stable Pacing Threshold: stable Impedance: stable Battery Status: 9.5 years remaining Atrial Arrhythmia: 6 mode switch comprising 74% of the time since 5/24/24. 1 EGM shows As>Vs for an atrial arrhythmia lasting 6 seconds, ventricular rates controlled and 4 EGMs for review show As>Vs for AFL, longest episode lasted 1288 hours 21 minutes, ventricular rates mostly in the 70s during AFL. Ventricular Arrhythmia: 3 ventricular high rates logged. 2 EGMs show AFL with RVR, lasting 14-16 seconds, rates in the 160s. EF 55-60% on 8/1/22. Will continue to monitor. Care Plan: Pt Had DCCV 5/24/24. F/U annual threshold in 3 months. OV with Dr Stauffer scheduled 9/3/24. Results sent via [employer]. MADY Alva I have reviewed and interpreted the device interrogation, settings, programming and nurse's summary. The device is functioning within normal device parameters. I agree with the current findings, assessment and plan.        LABS:      Sodium   Date Value Ref Range Status   07/25/2024 137 135 - 145 mmol/L Final   02/16/2024 138 135 - 145 mmol/L Final     Comment:     Reference intervals for this test were updated on 09/26/2023 to more accurately reflect our  healthy population. There may be differences in the flagging of prior results with similar values performed with this method. Interpretation of those prior results can be made in the context of the updated reference intervals.    08/01/2023 140 136 - 145 mmol/L Final   06/01/2020 136 133 - 144 mmol/L Final   02/14/2020 140 133 - 144 mmol/L Final   02/13/2020 137 133 - 144 mmol/L Final     Urea Nitrogen   Date Value Ref Range Status   07/25/2024 14.0 8.0 - 23.0 mg/dL Final   02/16/2024 19.6 8.0 - 23.0 mg/dL Final   08/01/2023 17.0 8.0 - 23.0 mg/dL Final   08/03/2022 15 7 - 30 mg/dL Final   08/02/2022 14 7 - 30 mg/dL Final   08/01/2022 17 7 - 30 mg/dL Final   06/01/2020 13 7 - 30 mg/dL Final   02/14/2020 10 7 - 30 mg/dL Final   02/13/2020 15 7 - 30 mg/dL Final     Hemoglobin   Date Value Ref Range Status   02/16/2024 12.8 (L) 13.3 - 17.7 g/dL Final   01/17/2023 13.2 (L) 13.3 - 17.7 g/dL Final   01/10/2023 12.2 (L) 13.3 - 17.7 g/dL Final   06/15/2020 13.0 (L) 13.3 - 17.7 g/dL Final   03/23/2020 12.2 (L) 13.3 - 17.7 g/dL Final   03/16/2020 11.9 (L) 13.3 - 17.7 g/dL Final     Platelet Count   Date Value Ref Range Status   02/16/2024 277 150 - 450 10e3/uL Final   01/17/2023 376 150 - 450 10e3/uL Final   01/10/2023 319 150 - 450 10e3/uL Final   06/15/2020 316 150 - 450 10e9/L Final   03/23/2020 349 150 - 450 10e9/L Final   03/16/2020 331 150 - 450 10e9/L Final     INR   Date Value Ref Range Status   08/22/2024 2.5 (H) 0.9 - 1.1 Final   08/14/2024 2.7 (H) 0.9 - 1.1 Final   08/08/2024 3.1 (H) 0.9 - 1.1 Final   01/10/2023 1.70 (H) 0.85 - 1.15 Final   01/09/2023 1.37 (H) 0.85 - 1.15 Final   01/08/2023 1.26 (H) 0.85 - 1.15 Final   07/08/2021 2.00 (H) 0.86 - 1.14 Final     Comment:     This test is intended for monitoring Coumadin therapy.  Results are not   accurate in patients with prolonged INR due to factor deficiency.     06/17/2021 1.80 (H) 0.86 - 1.14 Final     Comment:     This test is intended for monitoring Coumadin  therapy.  Results are not   accurate in patients with prolonged INR due to factor deficiency.     05/20/2021 2.60 (H) 0.86 - 1.14 Final     Comment:     This test is intended for monitoring Coumadin therapy.  Results are not   accurate in patients with prolonged INR due to factor deficiency.       Assessment/Plan:  This is pleasant 84 year old male who is being seen in virtual clinic today to discuss the results of the CTA abdomen/pelvis that ws performed on 8/17/2024.  Endovascular stent graft repair of infrarenal abdominal aortic aneurysm aortic aneurysm.  Stable aneurysm sac size with delayed type II endoleak identified.  The endoleak appears decreased in comparison sto previous study.  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      This was a in person visit in which 20 minutes of  total time was spent (either in face-to-face or non-face-to-face time).  Dr. Johnnie Osborne   Interventional Radiology  Pager# [phone]  [facility]

## 2024-08-28 DIAGNOSIS — I71.43 INFRARENAL ABDOMINAL AORTIC ANEURYSM (AAA) WITHOUT RUPTURE (H): Primary | ICD-10-CM

## 2024-09-03 ENCOUNTER — OFFICE VISIT (OUTPATIENT)
Dept: CARDIOLOGY | Facility: CLINIC | Age: 84
End: 2024-09-03
Payer: MEDICARE

## 2024-09-03 VITALS
DIASTOLIC BLOOD PRESSURE: 68 MMHG | BODY MASS INDEX: 33.82 KG/M2 | HEIGHT: 65 IN | OXYGEN SATURATION: 98 % | WEIGHT: 203 LBS | HEART RATE: 67 BPM | SYSTOLIC BLOOD PRESSURE: 138 MMHG

## 2024-09-03 DIAGNOSIS — I48.91 ATRIAL FIBRILLATION STATUS POST CARDIOVERSION (H): ICD-10-CM

## 2024-09-03 PROCEDURE — 99214 OFFICE O/P EST MOD 30 MIN: CPT | Performed by: INTERNAL MEDICINE

## 2024-09-03 PROCEDURE — 93000 ELECTROCARDIOGRAM COMPLETE: CPT | Performed by: INTERNAL MEDICINE

## 2024-09-03 NOTE — LETTER
9/3/2024    Chris Flower MD  [address] [place] [address]    RE: Fausto Farr       Dear Colleague,     I had the pleasure of seeing Fausto Farr in the [facility].    Electrophysiology Clinic Progress Note    Fausto Farr MRN# [id number]   YOB: 1940 Age: 84 year old     Primary cardiologist: Dr. Santo         Assessment and Plan   Delightful pt with the following medical issues as outlined by Dr. Vasquez:   1. Valvular heart disease.  AVR in 2006 with subsequent perivalvular leak and redo mechanical AVR and concomitant mechanical MVR in 2013.   2.  Coronary artery disease with CABG (LIMA to LAD and radial graft to RCA) in 2006.   3.  Chronic diastolic heart failure.  LVEF is 55%-60%.   4.  Previous endovascular AAA repair.   5.  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    6.  Obstructive sleep apnea with use of CPAP.   7.  Hypertension.   8.  Dyslipidemia.   9.  Chronic back pain.   10.  Mild internal carotid artery disease.  Severe stenosis of left external carotid artery.   11.   History of typical atrial flutter, successful catheter ablation in 04/2019.   12.  Varicose veins with venous insufficiency.  Treatment with VenaSeal closure, sclerotherapy and phlebectomy by Dr. Whitman in 01/2019.  13.  Asymptomatic NSVT.    Since last visit with Dr. Vasquez in 2020 pt has developed CHB requiring a ppm. This past May noted to be in persistent AF/AFL and at the same time noted lack of energy. Previous device check in Dec shows sinus rhythm. Pt underwent cardioversion and felt much better right away and continues to feel the same way despite being back in AF/AFL in June. Today's ecg shows atypical AFlutter. Recent device check shows 100% AF/AFL. Normal LVEF with another echo in nov.     Recurrent persistent AF/atypical AFL. Pt essentially has no symptoms, denies sob or lack of energy. He and his wife is looking forward to spend a week  "in [place] next month then drive to [place]. As pt has no sxs no indication for rhythm control. Continue with current meds. Should LVEF is lower due to RV pacing induced then consider upgrading device to CRT. See EP prn.             Review of Systems     12-pt ROS is negative except for as noted in the HPI.          Physical Exam     Vitals: /68   Pulse 67   Ht 1.651 m (5' 5\")   Wt 92.1 kg (203 lb)   SpO2 98%   BMI 33.78 kg/m    Wt Readings from Last 10 Encounters:   09/03/24 92.1 kg (203 lb)   06/14/24 91.1 kg (200 lb 14.4 oz)   05/24/24 94.1 kg (207 lb 7.3 oz)   05/08/24 96.2 kg (212 lb)   11/21/23 98.6 kg (217 lb 4.8 oz)   10/24/23 98.6 kg (217 lb 6.4 oz)   09/06/23 97.3 kg (214 lb 6.4 oz)   08/01/23 96.2 kg (212 lb)   06/07/23 97.3 kg (214 lb 6.4 oz)   01/17/23 96 kg (211 lb 11.2 oz)       Constitutional:  Patient is pleasant, alert, cooperative, and in NAD.  HEENT:  NCAT. PERRLA. EOM's intact.   Neck:  CVP appears normal. No carotid bruits.   Pulmonary: Normal respiratory effort. CTAB.   Cardiac: RRR, normal S1/S2, no S3/S4, no murmur or rub.   Abdomen:  Non-tender abdomen, no hepatosplenomegaly appreciated.   Vascular: Pulses in the upper and lower extremities are 2+ and equal bilaterally.  Extremities: No edema, erythema, cyanosis or tenderness appreciated.  Skin:  No rashes or lesions appreciated.   Neurological:  No gross motor or sensory deficits.   Psych: Appropriate affect.          Data   Labs reviewed:  Recent Labs   Lab Test 07/25/24  1047 10/12/23  0929 08/01/23  0917 07/27/22  0822 06/01/20  0941 11/13/19  0804 07/12/18  0747 05/08/18  1043   LDL 66  --  57 64   < >  --    < > 52   HDL 28*  --  31* 34*   < >  --    < > 32*   NHDL 98  --  91 89   < >  --    < > 87   CHOL 126  --  122 123   < >  --    < > 119   TRIG 162*  --  172* 124   < >  --    < > 176*   TSH  --  1.94  --   --   --  0.54  --  0.96    < > = values in this interval not displayed.       Lab Results   Component Value Date    WBC " 7.2 02/16/2024    WBC 7.6 06/15/2020    RBC 4.48 02/16/2024    RBC 4.44 06/15/2020    HGB 12.8 (L) 02/16/2024    HGB 13.0 (L) 06/15/2020    HCT 41.0 02/16/2024    HCT 40.0 06/15/2020    MCV 92 02/16/2024    MCV 90 06/15/2020    MCH 28.6 02/16/2024    MCH 29.3 06/15/2020    MCHC 31.2 (L) 02/16/2024    MCHC 32.5 06/15/2020    RDW 12.5 02/16/2024    RDW 13.3 06/15/2020     02/16/2024     06/15/2020       Lab Results   Component Value Date     07/25/2024     06/01/2020    POTASSIUM 4.4 07/25/2024    POTASSIUM 4.0 08/03/2022    POTASSIUM 4.0 06/01/2020    CHLORIDE 104 07/25/2024    CHLORIDE 107 08/03/2022    CHLORIDE 106 06/01/2020    CO2 24 07/25/2024    CO2 27 08/03/2022    CO2 26 06/01/2020    ANIONGAP 9 07/25/2024    ANIONGAP 6 08/03/2022    ANIONGAP 4 06/01/2020     (H) 07/25/2024     (H) 01/08/2023     (H) 08/03/2022     (H) 06/01/2020    BUN 14.0 07/25/2024    BUN 15 08/03/2022    BUN 13 06/01/2020    CR 0.85 07/25/2024    CR 0.73 06/01/2020    GFRESTIMATED 86 07/25/2024    GFRESTIMATED >60 10/12/2022    GFRESTIMATED 72 10/06/2020    GFRESTBLACK 87 10/06/2020    AWILDA 9.4 07/25/2024    AWILDA 8.5 06/01/2020      Lab Results   Component Value Date    AST 33 08/01/2023    AST 19 06/01/2020    ALT 28 07/25/2024    ALT 16 06/01/2020       Lab Results   Component Value Date    A1C 6.8 (H) 05/16/2024    A1C 5.9 04/25/2017       Lab Results   Component Value Date    INR 2.5 (H) 08/22/2024    INR 2.7 (H) 08/14/2024    INR 1.70 (H) 01/10/2023    INR 1.37 (H) 01/09/2023    INR 2.00 (H) 07/08/2021    INR 1.80 (H) 06/17/2021            Problem List     Patient Active Problem List   Diagnosis     Ulcerative colitis (H)     Atrial fibrillation (H)     Gastric ulcer     Bilateral low back pain with left-sided sciatica     Diaphragmatic hernia     Nocturia     Malignant neoplasm of prostate (H)     Abdominal aortic aneurysm (H24)     Vitamin D deficiency disease     Anemia relative  at 12.5 in 8-13      Essential hypertension, benign     Hyperlipidemia LDL goal <100     Prostate cancer (H)     Glucose intolerance (impaired glucose tolerance)     Sciatica of left side since 2000     Valvular heart disease     Heart murmur     Coronary artery disease     ACP (advance care planning)     S/P aortic valve replacement     S/P CABG (coronary artery bypass graft)     Stented coronary artery     Mixed hyperlipidemia     PVD (peripheral vascular disease) (H24)     Personal history of tobacco use, presenting hazards to health     Spinal stenosis of lumbar region without neurogenic claudication     S/P mitral valve replacement     RBBB with left anterior fascicular block     S/P lumbar spinal fusion     Long term current use of anticoagulant therapy     Chronic systolic congestive heart failure (H)     GAGE (obstructive sleep apnea)     Obesity (BMI 35.0-39.9) with comorbidity (H)     Knee pain, chronic     Diabetes mellitus, type 2 (H)     History of left occipital stroke     Septic arthritis of elbow, right (H)     Longstanding persistent atrial fibrillation (H)            Medications     Current Outpatient Medications   Medication Sig Dispense Refill     acetaminophen (TYLENOL) 325 MG tablet Take 2 tablets (650 mg) by mouth every 6 hours as needed for mild pain or other (and adjunct with moderate or severe pain or per patient request) 100 tablet 0     amoxicillin-clavulanate (AUGMENTIN) 875-125 MG tablet Take 1 tablet by mouth daily.       betamethasone valerate (VALISONE) 0.1 % external lotion Apply topically 2 times daily Apply topically to scalp twice daily PRN 60 mL 3     cholecalciferol 25 MCG (1000 UT) TABS Take 1,000 Units by mouth daily       ezetimibe (ZETIA) 10 MG tablet Take 1 tablet (10 mg) by mouth daily 100 tablet 3     ferrous sulfate (FEROSUL) 325 (65 Fe) MG tablet Take 325 mg by mouth daily (with breakfast)       fluocinonide (LIDEX) 0.05 % external ointment Apply topically 2 times daily  60 g 11     glucosamine-chondroitin 500-400 MG CAPS per capsule Take 1 capsule by mouth every evening       mesalamine (ASACOL HD) 800 MG EC tablet Take 1 tablet (800 mg) by mouth 2 times daily 200 tablet 3     metoprolol succinate ER (TOPROL XL) 50 MG 24 hr tablet Take 1 1/2 tab (75mg) daily 135 tablet 3     rosuvastatin (CRESTOR) 40 MG tablet Take 1 tablet (40 mg) by mouth daily 100 tablet 3     tamsulosin (FLOMAX) 0.4 MG capsule Take 1 capsule (0.4 mg) by mouth daily 90 capsule 3     triamcinolone (KENALOG) 0.1 % external cream Apply topically 2 times daily 85.2 g 1     warfarin ANTICOAGULANT (COUMADIN) 5 MG tablet Take 1 tablet (5mg) everyday OR per INR clinic 90 tablet 1     enoxaparin ANTICOAGULANT (LOVENOX) 100 MG/ML syringe Inject 0.9 mLs (90 mg) subcutaneously every 12 hours (Patient not taking: Reported on 9/3/2024) 10 mL 1            Past Medical History     Past Medical History:   Diagnosis Date     Abdominal pain      Anemia     currently taking iron     Back pain     since 1980     BPH (benign prostatic hyperplasia)      Bruit      CAD (coronary artery disease)      Cellulitis 10/18/2012     Cellulitis 05/2018    GrpB strep LLE cellulitis  negative RACHAEL for veg     Chronic venous insufficiency     bilat lower extremities     Contact dermatitis and other eczema, due to unspecified cause      Diaphragmatic hernia without mention of obstruction or gangrene      Diastolic HF (heart failure) (H)      Gastric ulcer      Hypertension 08/06/2013     Lumbago      Mesenteric artery insufficiency (H24)     known AAA and narrowing of intestinal artery's  followed by dr raymond     Metabolic syndrome      Mitral valve disease     s/p mechanical MVR, prior [facility] AVR     Nonallopathic lesion of lumbar region      Nonallopathic lesion of rib cage      Nonallopathic lesion of sacral region      GAGE (obstructive sleep apnea)     CPAP     Paroxysmal atrial fibrillation (H) 10/18/2012    occured after stopping BB  (prior   aflutter ablation)     Prostate cancer (H) 2008    radiation seed, XRT      PVD (peripheral vascular disease) (H24)      Rotator cuff strain     and sprain     S/P AAA repair      S/P aortic valve replacement 2006    developed perivalve leak and MS, therefore redo surg 2013     S/P CABG (coronary artery bypass graft) 2006    Lima-Lad, RA-Rca     Sciatica of left side     since 2000     Sepsis due to group B Streptococcus (H) 05/19/2018     TIA (transient ischemic attack) 08/01/2022     Total knee replacement status 07/22/2019     Ulcerative colitis (H)      Varicose veins of bilateral lower extremities with other complications     s/p RLE vein stripping     Vitamin D deficiency      Past Surgical History:   Procedure Laterality Date     AORTIC VALVE REPLACEMENT  1/3/06    redo AVR SJM 21mm and SJM MVR 27mm in 2013SJM 21([id number]):AVR, SJM 27 :MVR-     APPLY WOUND VAC N/A 11/12/2019    Procedure: APPLICATION, WOUND VAC;  Surgeon: Sara Martinez MD;  Location:  OR     ARTHROPLASTY KNEE      right knee     ARTHROPLASTY KNEE Right 7/22/2019    Procedure: Right total knee arthroplasty;  Surgeon: Prasanth Jensen MD;  Location:  OR     BACK SURGERY  Oct 2015    Fusion L4-5, laminectomy L2, L3     BYPASS GRAFT ARTERY CORONARY  10/2013    reimplantation of radial artery graft to RCA     CARDIAC CATHERIZATION  11/2005    Stent placed to RCA     CARDIAC CATHERIZATION  04/2013    Occluded RCA, patent LIMA to LAD and radial graft to PDA     CARPAL TUNNEL RELEASE RT/LT  1994     COLONOSCOPY  8-22-11     CYSTOSCOPY FLEXIBLE  10/16/2013    Procedure: CYSTOSCOPY FLEXIBLE;  FLEXIBLE CYSTOSCOPY / DILATION OF URETHRA / INSERTION OF LESLIE;  Surgeon: Cooper Wallace MD;  Location:  OR     ENDOVASCULAR REPAIR, INFRARENAL ABDOMINAL AORTIC ANEURYSM/DISSECTION; MODULAR BIFURCATED PROSTHESIS  2006    AAA repair endovascular     ENT SURGERY       EP ABLATION ATRIAL FLUTTER N/A 4/22/2019    Procedure: EP Ablation  Atrial Flutter;  Surgeon: Jessy Vasquez MD;  Location:  HEART CARDIAC CATH LAB     EP PACEMAKER DEVICE & LEAD IMPLANT- RIGHT ATRIAL & RIGHT VENTRICULAR N/A 8/2/2022    Procedure: Pacemaker Device & Lead Implant - Right Atrial & Right Ventricular;  Surgeon: Jessy Vasquez MD;  Location:  HEART CARDIAC CATH LAB     GENITOURINARY SURGERY  6/16/08    radioactive seeding     GRAFT FLAP PEDICLE EXTREMITY (LOCATION) Right 11/12/2019    Procedure: SURGICAL PROCUREMENT, FLAP, PEDICLE, EXTREMITY;  Surgeon: Sara Martinez MD;  Location:  OR     GRAFT SKIN SPLIT THICKNESS FROM EXTREMITY Right 11/12/2019    Procedure: RIGHT GASTRONEMIUS FLAP TO RIGHT KNEE, SPLIT THICKNESS SKIN GRAFT FROM RIGHT THIGH TO RIGHT KNEE, SURGICAL PROCUREMENT, FLAP, PEDICLE, EXTREMITY, WOUND VAC PLACEMENT;  Surgeon: Sara Martinez MD;  Location:  OR     HEAD & NECK SURGERY  1997    vocal cord polypectomy     INCISION AND DRAINAGE KNEE, COMBINED Right 8/29/2019    Procedure: INCISION AND DRAINAGE, KNEE W/ Secondary Wound Closure;  Surgeon: Prasanth Jensen MD;  Location:  OR     IRRIGATION AND DEBRIDEMENT KNEE, PLACE ANTIBIOTIC CEMENT BEADS / SPACE Right 2/12/2020    Procedure: 1. Irrigation and debridement of wound breakdown, right total knee arthroplasty.  2. Irrigation and debridement of right total knee with placement of antibiotic-impregnated (vancomycin) absorbable antibiotic beads.;  Surgeon: Prasanth Jensen MD;  Location:  OR     IRRIGATION AND DEBRIDEMENT LOWER EXTREMITY, COMBINED Right 12/8/2019    Procedure: DEBRIDEMENT OF RIGHT CALF AND WOUND CLOSURE.;  Surgeon: Sara Martinez MD;  Location:  OR     IRRIGATION AND DEBRIDEMENT UPPER EXTREMITY, COMBINED Right 1/7/2023    Procedure: Right elbow arthrotomy, irrigation and debridement;  Surgeon: Jose A Christine MD;  Location:  OR     KNEE SURGERY  2001 Right knee arthroscopy     OPTICAL TRACKING SYSTEM FUSION SPINE  POSTERIOR LUMBAR THREE+ LEVELS N/A 10/29/2015    Procedure: OPTICAL TRACKING SYSTEM FUSION SPINE POSTERIOR LUMBAR THREE+ LEVELS;  Surgeon: Walt Garcia MD;  Location: [facility] OR     PROSTATE SURGERY      radioactive seeding 08     REPAIR ANEURYSM ABDOMINAL AORTA       REPAIR VALVE MITRAL  10/16/2013    SJM 21([id number]):AVR, SJM 27 :MVRProcedure: REPAIR VALVE MITRAL;  REDO STERNOTOMY/REDO AORTIC VALVE REPLACEMENT/ MITRAL VALVE REPLACEMENT/REIMPLANTATION OF RIGHT CORONARY ARTERY BYPASS WITH RACHAEL ( ON PUMP);  Surgeon: Viet Singh MD;  Location:  OR     REPLACE VALVE AORTIC  10/16/2013    Procedure: REPLACE VALVE AORTIC;;  Surgeon: Viet Singh MD;  Location: [facility] OR     SURGERY GENERAL IP CONSULT  2008 Excision aneurysm abdominal aorta     SURGERY GENERAL IP CONSULT   Vocal cord polypectomy     VASCULAR SURGERY  1993     varicose vein stripping     ZZC CABG, VEIN, TWO  1/3/06    Left radial to RCA, LIMA to LAD (RA to RCA reimplanted at time of 2013 surg)     Family History   Problem Relation Age of Onset     No Known Problems Mother      Coronary Artery Disease Father         CABG     Heart Disease Father         Pacemaker     No Known Problems Brother      No Known Problems Sister      No Known Problems Son      Other Cancer Daughter      No Known Problems Daughter      Heart Disease Brother      Other - See Comments Grandchild      Social History     Socioeconomic History     Marital status:      Spouse name: Not on file     Number of children: Not on file     Years of education: Not on file     Highest education level: Not on file   Occupational History     Not on file   Tobacco Use     Smoking status: Former     Current packs/day: 0.00     Average packs/day: 1 pack/day for 40.5 years (40.5 ttl pk-yrs)     Types: Cigarettes     Start date: 4/15/1962     Quit date: 10/23/2002     Years since quittin.8     Smokeless tobacco: Never   Vaping Use     Vaping  status: Never Used   Substance and Sexual Activity     Alcohol use: Yes     Comment: a couple beers per week (socially)     Drug use: No     Sexual activity: Not Currently     Partners: Female   Other Topics Concern     Parent/sibling w/ CABG, MI or angioplasty before 65F 55M? Yes     Comment: Brother had bypass at 55      Service Not Asked     Blood Transfusions Not Asked     Caffeine Concern No     Comment: 6-8 cups of half and half per day     Occupational Exposure Not Asked     Hobby Hazards Not Asked     Sleep Concern Not Asked     Stress Concern Not Asked     Weight Concern Not Asked     Special Diet No     Back Care Not Asked     Exercise No     Bike Helmet Not Asked     Seat Belt Not Asked     Self-Exams Not Asked   Social History Narrative     Not on file     Social Determinants of Health     Financial Resource Strain: Not on file   Food Insecurity: Not on file   Transportation Needs: Not on file   Physical Activity: Not on file   Stress: Not on file   Social Connections: Not on file   Interpersonal Safety: Low Risk  (10/24/2023)    Interpersonal Safety      Do you feel physically and emotionally safe where you currently live?: Yes      Within the past 12 months, have you been hit, slapped, kicked or otherwise physically hurt by someone?: No      Within the past 12 months, have you been humiliated or emotionally abused in other ways by your partner or ex-partner?: No   Housing Stability: Not on file            Allergies   Bees    Today's clinic visit entailed:  The following tests were independently interpreted by me as noted in my documentation: device check, ecg, echo  30 minutes spent by me on the date of the encounter doing chart review, history and exam, documentation and further activities per the note  Provider  Link to [facility] Help Grid     The level of medical decision making during this visit was of moderate complexity.     Thank you for allowing me to participate in the care of your  patient.      Sincerely,     Bryant Ceja MD     [facility]  cc:   Calderon Santo MD  [address]  [place] [address]

## 2024-09-03 NOTE — PROGRESS NOTES
Electrophysiology Clinic Progress Note    Fausto Farr MRN# [id number]   YOB: 1940 Age: 84 year old     Primary cardiologist: Dr. Santo         Assessment and Plan   Delightful pt with the following medical issues as outlined by Dr. Vasquez:   1. Valvular heart disease.  AVR in 2006 with subsequent perivalvular leak and redo mechanical AVR and concomitant mechanical MVR in 2013.   2.  Coronary artery disease with CABG (LIMA to LAD and radial graft to RCA) in 2006.   3.  Chronic diastolic heart failure.  LVEF is 55%-60%.   4.  Previous endovascular AAA repair.   5.  AV conduction disease with bifascicular block and prolonged AZ.    6.  Obstructive sleep apnea with use of CPAP.   7.  Hypertension.   8.  Dyslipidemia.   9.  Chronic back pain.   10.  Mild internal carotid artery disease.  Severe stenosis of left external carotid artery.   11.   History of typical atrial flutter, successful catheter ablation in 04/2019.   12.  Varicose veins with venous insufficiency.  Treatment with VenaSeal closure, sclerotherapy and phlebectomy by Dr. Whitman in 01/2019.  13.  Asymptomatic NSVT.    Since last visit with Dr. Vasquez in 2020 pt has developed CHB requiring a ppm. This past May noted to be in persistent AF/AFL and at the same time noted lack of energy. Previous device check in Dec shows sinus rhythm. Pt underwent cardioversion and felt much better right away and continues to feel the same way despite being back in AF/AFL in June. Today's ecg shows atypical AFlutter. Recent device check shows 100% AF/AFL. Normal LVEF with another echo in nov.     Recurrent persistent AF/atypical AFL. Pt essentially has no symptoms, denies sob or lack of energy. He and his wife is looking forward to spend a week in [place] next month then drive to [place]. As pt has no sxs no indication for rhythm control. Continue with current meds. Should LVEF is lower due to RV pacing induced then consider upgrading device to CRT. See EP  "prn.             Review of Systems     12-pt ROS is negative except for as noted in the HPI.          Physical Exam     Vitals: /68   Pulse 67   Ht 1.651 m (5' 5\")   Wt 92.1 kg (203 lb)   SpO2 98%   BMI 33.78 kg/m    Wt Readings from Last 10 Encounters:   09/03/24 92.1 kg (203 lb)   06/14/24 91.1 kg (200 lb 14.4 oz)   05/24/24 94.1 kg (207 lb 7.3 oz)   05/08/24 96.2 kg (212 lb)   11/21/23 98.6 kg (217 lb 4.8 oz)   10/24/23 98.6 kg (217 lb 6.4 oz)   09/06/23 97.3 kg (214 lb 6.4 oz)   08/01/23 96.2 kg (212 lb)   06/07/23 97.3 kg (214 lb 6.4 oz)   01/17/23 96 kg (211 lb 11.2 oz)       Constitutional:  Patient is pleasant, alert, cooperative, and in NAD.  HEENT:  NCAT. PERRLA. EOM's intact.   Neck:  CVP appears normal. No carotid bruits.   Pulmonary: Normal respiratory effort. CTAB.   Cardiac: RRR, normal S1/S2, no S3/S4, no murmur or rub.   Abdomen:  Non-tender abdomen, no hepatosplenomegaly appreciated.   Vascular: Pulses in the upper and lower extremities are 2+ and equal bilaterally.  Extremities: No edema, erythema, cyanosis or tenderness appreciated.  Skin:  No rashes or lesions appreciated.   Neurological:  No gross motor or sensory deficits.   Psych: Appropriate affect.          Data   Labs reviewed:  Recent Labs   Lab Test 07/25/24  1047 10/12/23  0929 08/01/23  0917 07/27/22  0822 06/01/20  0941 11/13/19  0804 07/12/18  0747 05/08/18  1043   LDL 66  --  57 64   < >  --    < > 52   HDL 28*  --  31* 34*   < >  --    < > 32*   NHDL 98  --  91 89   < >  --    < > 87   CHOL 126  --  122 123   < >  --    < > 119   TRIG 162*  --  172* 124   < >  --    < > 176*   TSH  --  1.94  --   --   --  0.54  --  0.96    < > = values in this interval not displayed.       Lab Results   Component Value Date    WBC 7.2 02/16/2024    WBC 7.6 06/15/2020    RBC 4.48 02/16/2024    RBC 4.44 06/15/2020    HGB 12.8 (L) 02/16/2024    HGB 13.0 (L) 06/15/2020    HCT 41.0 02/16/2024    HCT 40.0 06/15/2020    MCV 92 02/16/2024    MCV " 90 06/15/2020    MCH 28.6 02/16/2024    MCH 29.3 06/15/2020    MCHC 31.2 (L) 02/16/2024    MCHC 32.5 06/15/2020    RDW 12.5 02/16/2024    RDW 13.3 06/15/2020     02/16/2024     06/15/2020       Lab Results   Component Value Date     07/25/2024     06/01/2020    POTASSIUM 4.4 07/25/2024    POTASSIUM 4.0 08/03/2022    POTASSIUM 4.0 06/01/2020    CHLORIDE 104 07/25/2024    CHLORIDE 107 08/03/2022    CHLORIDE 106 06/01/2020    CO2 24 07/25/2024    CO2 27 08/03/2022    CO2 26 06/01/2020    ANIONGAP 9 07/25/2024    ANIONGAP 6 08/03/2022    ANIONGAP 4 06/01/2020     (H) 07/25/2024     (H) 01/08/2023     (H) 08/03/2022     (H) 06/01/2020    BUN 14.0 07/25/2024    BUN 15 08/03/2022    BUN 13 06/01/2020    CR 0.85 07/25/2024    CR 0.73 06/01/2020    GFRESTIMATED 86 07/25/2024    GFRESTIMATED >60 10/12/2022    GFRESTIMATED 72 10/06/2020    GFRESTBLACK 87 10/06/2020    AWILDA 9.4 07/25/2024    AWILDA 8.5 06/01/2020      Lab Results   Component Value Date    AST 33 08/01/2023    AST 19 06/01/2020    ALT 28 07/25/2024    ALT 16 06/01/2020       Lab Results   Component Value Date    A1C 6.8 (H) 05/16/2024    A1C 5.9 04/25/2017       Lab Results   Component Value Date    INR 2.5 (H) 08/22/2024    INR 2.7 (H) 08/14/2024    INR 1.70 (H) 01/10/2023    INR 1.37 (H) 01/09/2023    INR 2.00 (H) 07/08/2021    INR 1.80 (H) 06/17/2021            Problem List     Patient Active Problem List   Diagnosis    Ulcerative colitis (H)    Atrial fibrillation (H)    Gastric ulcer    Bilateral low back pain with left-sided sciatica    Diaphragmatic hernia    Nocturia    Malignant neoplasm of prostate (H)    Abdominal aortic aneurysm (H24)    Vitamin D deficiency disease    Anemia relative at 12.5 in 8-13     Essential hypertension, benign    Hyperlipidemia LDL goal <100    Prostate cancer (H)    Glucose intolerance (impaired glucose tolerance)    Sciatica of left side since 2000    Valvular heart disease     Heart murmur    Coronary artery disease    ACP (advance care planning)    S/P aortic valve replacement    S/P CABG (coronary artery bypass graft)    Stented coronary artery    Mixed hyperlipidemia    PVD (peripheral vascular disease) (H24)    Personal history of tobacco use, presenting hazards to health    Spinal stenosis of lumbar region without neurogenic claudication    S/P mitral valve replacement    RBBB with left anterior fascicular block    S/P lumbar spinal fusion    Long term current use of anticoagulant therapy    Chronic systolic congestive heart failure (H)    GAGE (obstructive sleep apnea)    Obesity (BMI 35.0-39.9) with comorbidity (H)    Knee pain, chronic    Diabetes mellitus, type 2 (H)    History of left occipital stroke    Septic arthritis of elbow, right (H)    Longstanding persistent atrial fibrillation (H)            Medications     Current Outpatient Medications   Medication Sig Dispense Refill    acetaminophen (TYLENOL) 325 MG tablet Take 2 tablets (650 mg) by mouth every 6 hours as needed for mild pain or other (and adjunct with moderate or severe pain or per patient request) 100 tablet 0    amoxicillin-clavulanate (AUGMENTIN) 875-125 MG tablet Take 1 tablet by mouth daily.      betamethasone valerate (VALISONE) 0.1 % external lotion Apply topically 2 times daily Apply topically to scalp twice daily PRN 60 mL 3    cholecalciferol 25 MCG (1000 UT) TABS Take 1,000 Units by mouth daily      ezetimibe (ZETIA) 10 MG tablet Take 1 tablet (10 mg) by mouth daily 100 tablet 3    ferrous sulfate (FEROSUL) 325 (65 Fe) MG tablet Take 325 mg by mouth daily (with breakfast)      fluocinonide (LIDEX) 0.05 % external ointment Apply topically 2 times daily 60 g 11    glucosamine-chondroitin 500-400 MG CAPS per capsule Take 1 capsule by mouth every evening      mesalamine (ASACOL HD) 800 MG EC tablet Take 1 tablet (800 mg) by mouth 2 times daily 200 tablet 3    metoprolol succinate ER (TOPROL XL) 50 MG 24  hr tablet Take 1 1/2 tab (75mg) daily 135 tablet 3    rosuvastatin (CRESTOR) 40 MG tablet Take 1 tablet (40 mg) by mouth daily 100 tablet 3    tamsulosin (FLOMAX) 0.4 MG capsule Take 1 capsule (0.4 mg) by mouth daily 90 capsule 3    triamcinolone (KENALOG) 0.1 % external cream Apply topically 2 times daily 85.2 g 1    warfarin ANTICOAGULANT (COUMADIN) 5 MG tablet Take 1 tablet (5mg) everyday OR per INR clinic 90 tablet 1    enoxaparin ANTICOAGULANT (LOVENOX) 100 MG/ML syringe Inject 0.9 mLs (90 mg) subcutaneously every 12 hours (Patient not taking: Reported on 9/3/2024) 10 mL 1            Past Medical History     Past Medical History:   Diagnosis Date    Abdominal pain     Anemia     currently taking iron    Back pain     since 1980    BPH (benign prostatic hyperplasia)     Bruit     CAD (coronary artery disease)     Cellulitis 10/18/2012    Cellulitis 05/2018    GrpB strep LLE cellulitis  negative RACHAEL for veg    Chronic venous insufficiency     bilat lower extremities    Contact dermatitis and other eczema, due to unspecified cause     Diaphragmatic hernia without mention of obstruction or gangrene     Diastolic HF (heart failure) (H)     Gastric ulcer     Hypertension 08/06/2013    Lumbago     Mesenteric artery insufficiency (H24)     known AAA and narrowing of intestinal artery's  followed by dr raymond    Metabolic syndrome     Mitral valve disease     s/p mechanical MVR, prior [facility] AVR    Nonallopathic lesion of lumbar region     Nonallopathic lesion of rib cage     Nonallopathic lesion of sacral region     GAGE (obstructive sleep apnea)     CPAP    Paroxysmal atrial fibrillation (H) 10/18/2012    occured after stopping BB  (prior  aflutter ablation)    Prostate cancer (H) 2008    radiation seed, XRT     PVD (peripheral vascular disease) (H24)     Rotator cuff strain     and sprain    S/P AAA repair     S/P aortic valve replacement 2006    developed perivalve leak and MS, therefore redo surg 2013    S/P CABG  (coronary artery bypass graft) 2006    Lima-Lad, RA-Rca    Sciatica of left side     since 2000    Sepsis due to group B Streptococcus (H) 05/19/2018    TIA (transient ischemic attack) 08/01/2022    Total knee replacement status 07/22/2019    Ulcerative colitis (H)     Varicose veins of bilateral lower extremities with other complications     s/p RLE vein stripping    Vitamin D deficiency      Past Surgical History:   Procedure Laterality Date    AORTIC VALVE REPLACEMENT  1/3/06    redo AVR SJM 21mm and SJM MVR 27mm in 2013SJM 21(AGFN [id number]):AVR, SJM 27 :MVR-    APPLY WOUND VAC N/A 11/12/2019    Procedure: APPLICATION, WOUND VAC;  Surgeon: Sara Martinez MD;  Location:  OR    ARTHROPLASTY KNEE      right knee    ARTHROPLASTY KNEE Right 7/22/2019    Procedure: Right total knee arthroplasty;  Surgeon: Prasanth Jensen MD;  Location:  OR    BACK SURGERY  Oct 2015    Fusion L4-5, laminectomy L2, L3    BYPASS GRAFT ARTERY CORONARY  10/2013    reimplantation of radial artery graft to RCA    CARDIAC CATHERIZATION  11/2005    Stent placed to RCA    CARDIAC CATHERIZATION  04/2013    Occluded RCA, patent LIMA to LAD and radial graft to PDA    CARPAL TUNNEL RELEASE RT/LT  1994    COLONOSCOPY  8-22-11    CYSTOSCOPY FLEXIBLE  10/16/2013    Procedure: CYSTOSCOPY FLEXIBLE;  FLEXIBLE CYSTOSCOPY / DILATION OF URETHRA / INSERTION OF LESLIE;  Surgeon: Cooper Wallace MD;  Location:  OR    ENDOVASCULAR REPAIR, INFRARENAL ABDOMINAL AORTIC ANEURYSM/DISSECTION; MODULAR BIFURCATED PROSTHESIS  2006    AAA repair endovascular    ENT SURGERY      EP ABLATION ATRIAL FLUTTER N/A 4/22/2019    Procedure: EP Ablation Atrial Flutter;  Surgeon: Jessy Vasquez MD;  Location:  HEART CARDIAC CATH LAB    EP PACEMAKER DEVICE & LEAD IMPLANT- RIGHT ATRIAL & RIGHT VENTRICULAR N/A 8/2/2022    Procedure: Pacemaker Device & Lead Implant - Right Atrial & Right Ventricular;  Surgeon: Jessy Vasquez MD;   Location:  HEART CARDIAC CATH LAB    GENITOURINARY SURGERY  6/16/08    radioactive seeding    GRAFT FLAP PEDICLE EXTREMITY (LOCATION) Right 11/12/2019    Procedure: SURGICAL PROCUREMENT, FLAP, PEDICLE, EXTREMITY;  Surgeon: Sara Martinez MD;  Location:  OR    GRAFT SKIN SPLIT THICKNESS FROM EXTREMITY Right 11/12/2019    Procedure: RIGHT GASTRONEMIUS FLAP TO RIGHT KNEE, SPLIT THICKNESS SKIN GRAFT FROM RIGHT THIGH TO RIGHT KNEE, SURGICAL PROCUREMENT, FLAP, PEDICLE, EXTREMITY, WOUND VAC PLACEMENT;  Surgeon: Sara Martinez MD;  Location:  OR    HEAD & NECK SURGERY  1997    vocal cord polypectomy    INCISION AND DRAINAGE KNEE, COMBINED Right 8/29/2019    Procedure: INCISION AND DRAINAGE, KNEE W/ Secondary Wound Closure;  Surgeon: Prasanth Jensen MD;  Location:  OR    IRRIGATION AND DEBRIDEMENT KNEE, PLACE ANTIBIOTIC CEMENT BEADS / SPACE Right 2/12/2020    Procedure: 1. Irrigation and debridement of wound breakdown, right total knee arthroplasty.  2. Irrigation and debridement of right total knee with placement of antibiotic-impregnated (vancomycin) absorbable antibiotic beads.;  Surgeon: Prasanth Jensen MD;  Location:  OR    IRRIGATION AND DEBRIDEMENT LOWER EXTREMITY, COMBINED Right 12/8/2019    Procedure: DEBRIDEMENT OF RIGHT CALF AND WOUND CLOSURE.;  Surgeon: Sara Martinez MD;  Location:  OR    IRRIGATION AND DEBRIDEMENT UPPER EXTREMITY, COMBINED Right 1/7/2023    Procedure: Right elbow arthrotomy, irrigation and debridement;  Surgeon: Jose A Christine MD;  Location:  OR    KNEE SURGERY  2001 Right knee arthroscopy    OPTICAL TRACKING SYSTEM FUSION SPINE POSTERIOR LUMBAR THREE+ LEVELS N/A 10/29/2015    Procedure: OPTICAL TRACKING SYSTEM FUSION SPINE POSTERIOR LUMBAR THREE+ LEVELS;  Surgeon: Walt Garcia MD;  Location:  OR    PROSTATE SURGERY      radioactive seeding 6/16/08    REPAIR ANEURYSM ABDOMINAL AORTA  06/08    REPAIR VALVE MITRAL  10/16/2013    [facility]  21():AVR, SJM 27 :MVRProcedure: REPAIR VALVE MITRAL;  REDO STERNOTOMY/REDO AORTIC VALVE REPLACEMENT/ MITRAL VALVE REPLACEMENT/REIMPLANTATION OF RIGHT CORONARY ARTERY BYPASS WITH RACHAEL ( ON PUMP);  Surgeon: Viet Singh MD;  Location:  OR    REPLACE VALVE AORTIC  10/16/2013    Procedure: REPLACE VALVE AORTIC;;  Surgeon: Viet Singh MD;  Location:  OR    SURGERY GENERAL IP CONSULT  2008 Excision aneurysm abdominal aorta    SURGERY GENERAL IP CONSULT   Vocal cord polypectomy    VASCULAR SURGERY  ,      varicose vein stripping    ZZC CABG, VEIN, TWO  1/3/06    Left radial to RCA, LIMA to LAD (RA to RCA reimplanted at time of 2013 surg)     Family History   Problem Relation Age of Onset    No Known Problems Mother     Coronary Artery Disease Father         CABG    Heart Disease Father         Pacemaker    No Known Problems Brother     No Known Problems Sister     No Known Problems Son     Other Cancer Daughter     No Known Problems Daughter     Heart Disease Brother     Other - See Comments Grandchild      Social History     Socioeconomic History    Marital status:      Spouse name: Not on file    Number of children: Not on file    Years of education: Not on file    Highest education level: Not on file   Occupational History    Not on file   Tobacco Use    Smoking status: Former     Current packs/day: 0.00     Average packs/day: 1 pack/day for 40.5 years (40.5 ttl pk-yrs)     Types: Cigarettes     Start date: 4/15/1962     Quit date: 10/23/2002     Years since quittin.8    Smokeless tobacco: Never   Vaping Use    Vaping status: Never Used   Substance and Sexual Activity    Alcohol use: Yes     Comment: a couple beers per week (socially)    Drug use: No    Sexual activity: Not Currently     Partners: Female   Other Topics Concern    Parent/sibling w/ CABG, MI or angioplasty before 65F 55M? Yes     Comment: Brother had bypass at 55     Service Not  Asked    Blood Transfusions Not Asked    Caffeine Concern No     Comment: 6-8 cups of half and half per day    Occupational Exposure Not Asked    Hobby Hazards Not Asked    Sleep Concern Not Asked    Stress Concern Not Asked    Weight Concern Not Asked    Special Diet No    Back Care Not Asked    Exercise No    Bike Helmet Not Asked    Seat Belt Not Asked    Self-Exams Not Asked   Social History Narrative    Not on file     Social Determinants of Health     Financial Resource Strain: Not on file   Food Insecurity: Not on file   Transportation Needs: Not on file   Physical Activity: Not on file   Stress: Not on file   Social Connections: Not on file   Interpersonal Safety: Low Risk  (10/24/2023)    Interpersonal Safety     Do you feel physically and emotionally safe where you currently live?: Yes     Within the past 12 months, have you been hit, slapped, kicked or otherwise physically hurt by someone?: No     Within the past 12 months, have you been humiliated or emotionally abused in other ways by your partner or ex-partner?: No   Housing Stability: Not on file            Allergies   Bees    Today's clinic visit entailed:  The following tests were independently interpreted by me as noted in my documentation: device check, ecg, echo  30 minutes spent by me on the date of the encounter doing chart review, history and exam, documentation and further activities per the note  Provider  Link to [facility] Help Grid     The level of medical decision making during this visit was of moderate complexity.

## 2024-09-06 ENCOUNTER — NURSE TRIAGE (OUTPATIENT)
Dept: PEDIATRICS | Facility: CLINIC | Age: 84
End: 2024-09-06

## 2024-09-06 ENCOUNTER — ANTICOAGULATION THERAPY VISIT (OUTPATIENT)
Dept: ANTICOAGULATION | Facility: CLINIC | Age: 84
End: 2024-09-06

## 2024-09-06 ENCOUNTER — LAB (OUTPATIENT)
Dept: LAB | Facility: CLINIC | Age: 84
End: 2024-09-06
Payer: MEDICARE

## 2024-09-06 DIAGNOSIS — Z79.01 LONG TERM CURRENT USE OF ANTICOAGULANT THERAPY: ICD-10-CM

## 2024-09-06 DIAGNOSIS — Z95.2 S/P MITRAL VALVE REPLACEMENT: ICD-10-CM

## 2024-09-06 DIAGNOSIS — Z95.2 S/P MITRAL VALVE REPLACEMENT: Primary | ICD-10-CM

## 2024-09-06 DIAGNOSIS — Z95.2 S/P AORTIC VALVE REPLACEMENT: ICD-10-CM

## 2024-09-06 DIAGNOSIS — I48.11 LONGSTANDING PERSISTENT ATRIAL FIBRILLATION (H): ICD-10-CM

## 2024-09-06 LAB — INR BLD: 3.1 (ref 0.9–1.1)

## 2024-09-06 PROCEDURE — 85610 PROTHROMBIN TIME: CPT

## 2024-09-06 PROCEDURE — 36416 COLLJ CAPILLARY BLOOD SPEC: CPT

## 2024-09-06 NOTE — PROGRESS NOTES
ANTICOAGULATION MANAGEMENT     Fausto Farr 84 year old male is on warfarin with therapeutic INR result. (Goal INR 2.5-3.5)    Recent labs: (last 7 days)     09/06/24  0905   INR 3.1*       ASSESSMENT     Source(s): Chart Review and Patient/Caregiver Call     Warfarin doses taken: More warfarin taken than planned which may be affecting INR. Self-increased his dosing the past 2 weeks saying he was low last visit (was on the lower end of therapeutic). Will allow continuation of this dosing structure.  Diet: No new diet changes identified  Medication/supplement changes: None noted  New illness, injury, or hospitalization: Yes: feeling under the weather lately. He was sweating during today's call and thought he might have a fever.   Signs or symptoms of bleeding or clotting: No  Previous result: Therapeutic last 2(+) visits  Additional findings: None       PLAN     Recommended plan for no diet, medication or health factor changes affecting INR     Dosing Instructions: Continue your current warfarin dose (as you've been taking - shown as a 5.6% increase) with next INR in 3 weeks       Summary  As of 9/6/2024      Full warfarin instructions:  5 mg every Mon, Fri; 7.5 mg all other days   Next INR check:  9/27/2024               Telephone call with Ralph who verbalizes understanding and agrees to plan    Lab visit scheduled    Education provided: Please call back if any changes to your diet, medications or how you've been taking warfarin  Taking warfarin: importance of following [facility] instructions vs instructions on the prescription bottle and Importance of taking warfarin as instructed  Importance of notifying anticoagulation clinic for: changes in medications; a sooner lab recheck maybe needed and diarrhea, nausea/vomiting, reduced intake, cold/flu, and/or infections; a sooner lab recheck maybe needed    Plan made per [facility] anticoagulation protocol    Maria Luz Rios RN  Anticoagulation  Clinic  9/6/2024    _______________________________________________________________________     Anticoagulation Episode Summary       Current INR goal:  2.5-3.5   TTR:  45.2% (1 y)   Target end date:  Indefinite   Send INR reminders to:  SHANNA DOWELL    Indications    S/P mitral valve replacement [Z95.2]  Long term current use of anticoagulant therapy [Z79.01]  Longstanding persistent atrial fibrillation (H) [I48.11]             Comments:  Per 9/20/22 Cardio Note strict INR goal of 2.5-3.5. If INR falls below 2.5 at any time, needs to be bridged with Lovenox. consent to leave DVM              Anticoagulation Care Providers       Provider Role Specialty Phone number    Jodi Flower Mai, MD Referring Internal Medicine - Pediatrics [phone]

## 2024-09-06 NOTE — TELEPHONE ENCOUNTER
Provider Response to 2nd Level Triage Request    I have reviewed the RN documentation. My recommendation is:  I approve of plan of care as stated by RN

## 2024-09-06 NOTE — TELEPHONE ENCOUNTER
RN COVID TREATMENT VISIT  09/06/24      The patient has been triaged and does not require a higher level of care.    Fausto Farr  84 year old  Current weight? 33.78kg    Has the patient been seen by a primary care provider at an [facility] or [facility] within the past two years? Yes.   Have you been in close proximity to/do you have a known exposure to a person with a confirmed case of influenza? No.     General treatment eligibility:  Date of positive COVID test (PCR or at home)?  9/6/2024 home    Are you or have you been hospitalized for this COVID-19 infection? No.   Have you received monoclonal antibodies or antiviral treatment for COVID-19 since this positive test? No.   Do you have any of the following conditions that place you at risk of being very sick from COVID-19?   - Age 50 years or older  - Diabetes mellitus, type 1 and type 2  - Heart conditions such as cardiomyopathies, congenital heart defects, coronary artery disease, heart arrhythmias, heart failure, hypertension, valve disorders   - Overweight or Obesity (BMI >85th percentile or BMI 25 or higher)  Yes, patient has at least one high risk condition as noted above.     Current COVID symptoms:   - fever or chills  - cough  - fatigue  - headache  Yes. Patient has at least one symptom as selected.     How many days since symptoms started? 5 days or less. Established patient, 12 years or older weighing at least 88.2 lbs, who has symptoms that started in the past 5 days, has not been hospitalized nor received treatment already, and is at risk for being very sick from COVID-19.     Treatment eligibility by RN:  Are you currently pregnant or nursing? No  Do you have a clinically significant hypersensitivity to nirmatrelvir or ritonavir, or toxic epidermal necrolysis (TEN) or Caballero-Gabriel Syndrome? No  Do you have a history of hepatitis, any hepatic impairment on the Problem List (such as Child-Ware Class C, cirrhosis,  fatty liver disease, alcoholic liver disease), or was the last liver lab (hepatic panel, ALT, AST, ALK Phos, bilirubin) elevated in the past 6 months? No  Do you have any history of severe renal impairment (eGFR < 30mL/min)? No    Is patient eligible to continue? Yes, patient meets all eligibility requirements for the RN COVID treatment (as denoted by all no responses above).     Current Outpatient Medications   Medication Sig Dispense Refill    acetaminophen (TYLENOL) 325 MG tablet Take 2 tablets (650 mg) by mouth every 6 hours as needed for mild pain or other (and adjunct with moderate or severe pain or per patient request) 100 tablet 0    amoxicillin-clavulanate (AUGMENTIN) 875-125 MG tablet Take 1 tablet by mouth daily.      betamethasone valerate (VALISONE) 0.1 % external lotion Apply topically 2 times daily Apply topically to scalp twice daily PRN 60 mL 3    cholecalciferol 25 MCG (1000 UT) TABS Take 1,000 Units by mouth daily      enoxaparin ANTICOAGULANT (LOVENOX) 100 MG/ML syringe Inject 0.9 mLs (90 mg) subcutaneously every 12 hours (Patient not taking: Reported on 9/3/2024) 10 mL 1    ezetimibe (ZETIA) 10 MG tablet Take 1 tablet (10 mg) by mouth daily 100 tablet 3    ferrous sulfate (FEROSUL) 325 (65 Fe) MG tablet Take 325 mg by mouth daily (with breakfast)      fluocinonide (LIDEX) 0.05 % external ointment Apply topically 2 times daily 60 g 11    glucosamine-chondroitin 500-400 MG CAPS per capsule Take 1 capsule by mouth every evening      mesalamine (ASACOL HD) 800 MG EC tablet Take 1 tablet (800 mg) by mouth 2 times daily 200 tablet 3    metoprolol succinate ER (TOPROL XL) 50 MG 24 hr tablet Take 1 1/2 tab (75mg) daily 135 tablet 3    rosuvastatin (CRESTOR) 40 MG tablet Take 1 tablet (40 mg) by mouth daily 100 tablet 3    tamsulosin (FLOMAX) 0.4 MG capsule Take 1 capsule (0.4 mg) by mouth daily 90 capsule 3    triamcinolone (KENALOG) 0.1 % external cream Apply topically 2 times daily 85.2 g 1     warfarin ANTICOAGULANT (COUMADIN) 5 MG tablet Take 1 tablet (5mg) everyday OR per INR clinic 90 tablet 1       Medications from List 1 of the standing order (on medications that exclude the use of Paxlovid) that patient is taking: NONE. Is patient taking John's Wort? No  Is patient taking John's Wort or any meds from List 1? No.   Medications from List 2 of the standing order (on meds that provider needs to adjust) that patient is taking: warfarin (Coumadin, Jantoven), explained a provider visit is necessary to discuss medication adjustments while taking Paxlovid.   Is patient on any of the meds from List 2? Yes. Patient will be scheduled or transferred to a  at the end of this call.   Lluvia Hill RN

## 2024-09-06 NOTE — TELEPHONE ENCOUNTER
Nurse Triage SBAR    Is this a 2nd Level Triage? YES    Situation: Patient calling with wife regarding positive Covid test and symptoms    Background: Patient's wife states patient started having symptoms of chills, weakness, coughing, fatigue, and chest pain while coughing on 9/5/2024. Denies known exposure to COVID. Patient tested positive for COVID on two at home tests today 9/6/2024. Patient has never been diagnosed with COVID before. Patient has received COVID vaccines in 2021, 2022, and 2023. 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 Patient is on warfarin. Reports only taking tylenol and cough drops to treat symptoms. Does not have pulse oximeter at home.    Assessment: Patient states his worst symptoms is the cough. States he coughs about every 30 minutes while awake with phelgm in back of throat that he is unable to clear. States when he coughs he has 7/10 pain in the middle of his chest that goes away when he finishes coughing and is not present at any other time. He states last night he has chills but they are not present at this time. Also states last night he was weak and found it hard to get from bed to the bathroom. States today he is fatigued and has been resting, has not eaten, but has peed and it is yellow color. Denies signs of dehydration. Patient's wife states he complained of neck pain a few days ago. At the time of the call the patient had no neck pain and was able to touch his chin to his chest. Patient is afebrile. Denies shortness of breath or difficulty breathing and can speak in full sentences. Reports headache 3-4/10 that comes and goes. Denies loss of smell and is unsure about loss of taste as he  hasn't eaten anything today. Denies bluish/grey color to face or lips and cool/clammy skin.    Protocol Recommended Disposition:   Discuss With PCP And Callback By Nurse Within 1 Hour, Home Care    Recommendation: per protocol, recommend home care and pushing fluids. Patient's wife requested paxlovid. Writer started protocol and scheduled Urgent Care phone Virtual Visit for 9/7/2024 at 8:00 per RN paxlovid protocol as patient is currently on warfarin. Instructed patient to seek immediate care for new or worsening symptoms such as difficulty breathing, chest pain when not coughing or that does not go away after coughing, dehydration, inability to stand. Patient verbalized understanding and agrees with the plan.    Will route to PCP for input as patient may have high risk factors.  Dr. Flower please review and advise. Do you approve of patient being treated in [facility] tomorrow for paxlovid?    Routed to provider    Does the patient meet one of the following criteria for ADS visit consideration? 16+ years old, with an [facility] PCP     TIP  Providers, please consider if this condition is appropriate for management at one of our Acute and Diagnostic Services sites.     If patient is a good candidate, please use dotphrase <dot>triageresponse and select Refer to ADS to document.    Reason for Disposition   COVID-19 diagnosed by positive lab test (e.g., PCR, rapid self-test kit) and mild symptoms (e.g., cough, fever, others) and no complications or SOB   HIGH RISK patient (e.g., weak immune system, age > 64 years, obesity with BMI of 30 or higher, pregnant, chronic lung disease or other chronic medical condition) and COVID symptoms (e.g., cough, fever)  (Exceptions: Already seen by doctor or NP/PA and no new or worsening symptoms.)    Additional Information   Negative: SEVERE difficulty breathing (e.g., struggling for each breath, speaks in single words)   Negative: Difficult to awaken or acting confused (e.g., disoriented,  slurred speech)   Negative: Bluish (or gray) lips or face now   Negative: Shock suspected (e.g., cold/pale/clammy skin, too weak to stand, low BP, rapid pulse)   Negative: Sounds like a life-threatening emergency to the triager   Negative: Diagnosed or suspected COVID-19 and symptoms lasting 3 or more weeks   Negative: COVID-19 exposure and no symptoms   Negative: COVID-19 vaccine reaction suspected (e.g., fever, headache, muscle aches) occurring 1 to 3 days after getting vaccine   Negative: COVID-19 vaccine, questions about   Negative: Lives with someone known to have influenza (flu test positive) and flu-like symptoms (e.g., cough, runny nose, sore throat, SOB; with or without fever)   Negative: Possible COVID-19 symptoms and triager concerned about severity of symptoms or other causes   Negative: COVID-19 and breastfeeding, questions about   Negative: SEVERE or constant chest pain or pressure  (Exception: Mild central chest pain, present only when coughing.)   Negative: MODERATE difficulty breathing (e.g., speaks in phrases, SOB even at rest, pulse 100-120)   Negative: Oxygen level (e.g., pulse oximetry) 90% or lower   Negative: Chest pain or pressure  (Exception: MILD central chest pain, present only when coughing.)   Negative: Headache and stiff neck (can't touch chin to chest)   Negative: Drinking very little and dehydration suspected (e.g., no urine > 12 hours, very dry mouth, very lightheaded)   Negative: Patient sounds very sick or weak to the triager   Negative: MILD difficulty breathing (e.g., minimal/no SOB at rest, SOB with walking, pulse <100)   Negative: Fever > 103 F (39.4 C)   Negative: Fever > 101 F (38.3 C) and over 60 years of age   Negative: Fever > 100.0 F (37.8 C) and bedridden (e.g., CVA, chronic illness, recovering from surgery)   Negative: HIGH RISK patient and influenza is widespread in the community and ONE OR MORE respiratory symptoms: cough, sore throat, runny or stuffy nose   Negative:  "HIGH RISK patient and influenza exposure within the last 7 days and ONE OR MORE respiratory symptoms: cough, sore throat, runny or stuffy nose   Negative: Oxygen level (e.g., pulse oximetry) 91 to 94%   Negative: COVID-19 infection suspected by caller or triager and mild symptoms (cough, fever, or others) and negative COVID-19 rapid test   Negative: Fever present > 3 days (72 hours)   Negative: Fever returns after gone for over 24 hours and symptoms worse or not improved   Negative: Continuous (nonstop) coughing interferes with work or school and no improvement using cough treatment per Care Advice   Negative: Cough present > 3 weeks   Negative: COVID-19 diagnosed by positive lab test (e.g., PCR, rapid self-test kit) and NO symptoms (e.g., cough, fever, others)    Answer Assessment - Initial Assessment Questions  1. COVID-19 DIAGNOSIS: \"How do you know that you have COVID?\" (e.g., positive lab test or self-test, diagnosed by doctor or NP/PA, symptoms after exposure).      Self test was positive; 2 test   2. COVID-19 EXPOSURE: \"Was there any known exposure to COVID before the symptoms began?\" CDC Definition of close contact: within 6 feet (2 meters) for a total of 15 minutes or more over a 24-hour period.       Not that is aware of  3. ONSET: \"When did the COVID-19 symptoms start?\"       Afternoon 9/5  4. WORST SYMPTOM: \"What is your worst symptom?\" (e.g., cough, fever, shortness of breath, muscle aches)      Chills, cough, phelgm in throat is receding, feels weak, middle of chest pain only present when coughing, states is 7/10 when coughing and it is present  5. COUGH: \"Do you have a cough?\" If Yes, ask: \"How bad is the cough?\"        Coughing with phelgm about every 30 minutes when awake; cannot cough up phlegm  6. FEVER: \"Do you have a fever?\" If Yes, ask: \"What is your temperature, how was it measured, and when did it start?\"      afebrile  7. RESPIRATORY STATUS: \"Describe your breathing?\" (e.g., normal; " "shortness of breath, wheezing, unable to speak)       Able to speak in full sentences; no difficulty breathing; no wheezing  8. BETTER-SAME-WORSE: \"Are you getting better, staying the same or getting worse compared to yesterday?\"  If getting worse, ask, \"In what way?\"      Worse to same  9. OTHER SYMPTOMS: \"Do you have any other symptoms?\"  (e.g., chills, fatigue, headache, loss of smell or taste, muscle pain, sore throat)      Chills, fatigue, headache 3-4/10 that comes and goes; can smell; no sore throat or muscle pain  10. HIGH RISK DISEASE: \"Do you have any chronic medical problems?\" (e.g., asthma, heart or lung disease, weak immune system, obesity, etc.)        CAD, pacemaker, atrial fibrillation; chronic systoloic CHF, 2 mechanical heart valves  11. VACCINE: \"Have you had the COVID-19 vaccine?\" If Yes, ask: \"Which one, how many shots, when did you get it?\"        Three vaccines, 202, 2022, 2023  12. PREGNANCY: \"Is there any chance you are pregnant?\" \"When was your last menstrual period?\"        NA  13. O2 SATURATION MONITOR:  \"Do you use an oxygen saturation monitor (pulse oximeter) at home?\" If Yes, ask \"What is your reading (oxygen level) today?\" \"What is your usual oxygen saturation reading?\" (e.g., 95%)        Do not have    Protocols used: Coronavirus (COVID-19) Diagnosed or [id number]    "

## 2024-09-06 NOTE — TELEPHONE ENCOUNTER
Provider Recommendation Follow Up:   Per provider recommendation, will continue with plan of care to have patient treated by home care and by phone urgent care virtual visit 9/7/2024 at 8:00 to discuss paxlovid treatment.   Lluvia Hill RN

## 2024-09-07 ENCOUNTER — VIRTUAL VISIT (OUTPATIENT)
Dept: URGENT CARE | Facility: CLINIC | Age: 84
End: 2024-09-07
Payer: MEDICARE

## 2024-09-07 DIAGNOSIS — E78.5 HYPERLIPIDEMIA LDL GOAL <100: ICD-10-CM

## 2024-09-07 DIAGNOSIS — I48.91 ATRIAL FIBRILLATION, UNSPECIFIED TYPE (H): ICD-10-CM

## 2024-09-07 DIAGNOSIS — E11.9 TYPE 2 DIABETES MELLITUS WITHOUT COMPLICATION, WITHOUT LONG-TERM CURRENT USE OF INSULIN (H): ICD-10-CM

## 2024-09-07 DIAGNOSIS — U07.1 INFECTION DUE TO 2019 NOVEL CORONAVIRUS: Primary | ICD-10-CM

## 2024-09-07 DIAGNOSIS — C61 PROSTATE CANCER (H): ICD-10-CM

## 2024-09-07 PROCEDURE — 99443 PR PHYSICIAN TELEPHONE EVALUATION 21-30 MIN: CPT | Mod: 93

## 2024-09-07 NOTE — PROGRESS NOTES
"  Fausto Farr is a 84 year old male who is being evaluated via a billable telephone visit.      The patient has been notified of following at the time patient scheduled visit:     \"This telephone visit will be conducted via a phone call between you and your physician/provider. We have found that certain health care needs can be provided without the need for a physical exam.  This service lets us provide the care you need with a phone conversation.  If a prescription is necessary we can send it directly to your pharmacy.  If lab work is needed we can place an order for that and you can then stop by our lab to have the test done at a later time.\"   Patient has given consent for telephone visit?  Yes    SUBJECTIVE:  Fausto Farr is an 84 year old male who presents for positive test for covid.  Has a cough, some aching from cough.  Feels tired.  No fevers.  Sxs started three days ago and positive home test yesterday.  Has some nasal congestion.  No n/v/d.  No skin rashes.  Took some tylenol and cepacol which help a little.  No headache or body aches.  Has been vaccinated for covid.    PMH:   has a past medical history of Abdominal pain, Anemia, Back pain, BPH (benign prostatic hyperplasia), Bruit, CAD (coronary artery disease), Cellulitis (10/18/2012), Cellulitis (05/2018), Chronic venous insufficiency, Contact dermatitis and other eczema, due to unspecified cause, Diaphragmatic hernia without mention of obstruction or gangrene, Diastolic HF (heart failure) (H), Gastric ulcer, Hypertension (08/06/2013), Lumbago, Mesenteric artery insufficiency (H24), Metabolic syndrome, Mitral valve disease, Nonallopathic lesion of lumbar region, Nonallopathic lesion of rib cage, Nonallopathic lesion of sacral region, GAGE (obstructive sleep apnea), Paroxysmal atrial fibrillation (H) (10/18/2012), Prostate cancer (H) (2008), PVD (peripheral vascular disease) (H24), Rotator cuff strain, S/P AAA repair, S/P aortic valve " replacement (2006), S/P CABG (coronary artery bypass graft) (2006), Sciatica of left side, Sepsis due to group B Streptococcus (H) (05/19/2018), TIA (transient ischemic attack) (08/01/2022), Total knee replacement status (07/22/2019), Ulcerative colitis (H), Varicose veins of bilateral lower extremities with other complications, and Vitamin D deficiency.  Patient Active Problem List   Diagnosis    Ulcerative colitis (H)    Atrial fibrillation (H)    Gastric ulcer    Bilateral low back pain with left-sided sciatica    Diaphragmatic hernia    Nocturia    Malignant neoplasm of prostate (H)    Abdominal aortic aneurysm (H24)    Vitamin D deficiency disease    Anemia relative at 12.5 in 8-13     Essential hypertension, benign    Hyperlipidemia LDL goal <100    Prostate cancer (H)    Glucose intolerance (impaired glucose tolerance)    Sciatica of left side since 2000    Valvular heart disease    Heart murmur    Coronary artery disease    ACP (advance care planning)    S/P aortic valve replacement    S/P CABG (coronary artery bypass graft)    Stented coronary artery    Mixed hyperlipidemia    PVD (peripheral vascular disease) (H24)    Personal history of tobacco use, presenting hazards to health    Spinal stenosis of lumbar region without neurogenic claudication    S/P mitral valve replacement    RBBB with left anterior fascicular block    S/P lumbar spinal fusion    Long term current use of anticoagulant therapy    Chronic systolic congestive heart failure (H)    GAGE (obstructive sleep apnea)    Obesity (BMI 35.0-39.9) with comorbidity (H)    Knee pain, chronic    Diabetes mellitus, type 2 (H)    History of left occipital stroke    Septic arthritis of elbow, right (H)    Longstanding persistent atrial fibrillation (H)     Social History     Socioeconomic History    Marital status:    Tobacco Use    Smoking status: Former     Current packs/day: 0.00     Average packs/day: 1 pack/day for 40.5 years (40.5 ttl pk-yrs)      Types: Cigarettes     Start date: 4/15/1962     Quit date: 10/23/2002     Years since quittin.8    Smokeless tobacco: Never   Vaping Use    Vaping status: Never Used   Substance and Sexual Activity    Alcohol use: Yes     Comment: a couple beers per week (socially)    Drug use: No    Sexual activity: Not Currently     Partners: Female   Other Topics Concern    Parent/sibling w/ CABG, MI or angioplasty before 65F 55M? Yes     Comment: Brother had bypass at 55    Caffeine Concern No     Comment: 6-8 cups of half and half per day    Special Diet No    Exercise No     Social Determinants of Health     Interpersonal Safety: Low Risk  (10/24/2023)    Interpersonal Safety     Do you feel physically and emotionally safe where you currently live?: Yes     Within the past 12 months, have you been hit, slapped, kicked or otherwise physically hurt by someone?: No     Within the past 12 months, have you been humiliated or emotionally abused in other ways by your partner or ex-partner?: No     Family History   Problem Relation Age of Onset    No Known Problems Mother     Coronary Artery Disease Father         CABG    Heart Disease Father         Pacemaker    No Known Problems Brother     No Known Problems Sister     No Known Problems Son     Other Cancer Daughter     No Known Problems Daughter     Heart Disease Brother     Other - See Comments Grandchild        ALLERGIES:  Bees    Current Outpatient Medications   Medication Sig Dispense Refill    acetaminophen (TYLENOL) 325 MG tablet Take 2 tablets (650 mg) by mouth every 6 hours as needed for mild pain or other (and adjunct with moderate or severe pain or per patient request) 100 tablet 0    amoxicillin-clavulanate (AUGMENTIN) 875-125 MG tablet Take 1 tablet by mouth daily.      betamethasone valerate (VALISONE) 0.1 % external lotion Apply topically 2 times daily Apply topically to scalp twice daily PRN 60 mL 3    cholecalciferol 25 MCG (1000 UT) TABS Take 1,000 Units  by mouth daily      ezetimibe (ZETIA) 10 MG tablet Take 1 tablet (10 mg) by mouth daily 100 tablet 3    ferrous sulfate (FEROSUL) 325 (65 Fe) MG tablet Take 325 mg by mouth daily (with breakfast)      fluocinonide (LIDEX) 0.05 % external ointment Apply topically 2 times daily 60 g 11    glucosamine-chondroitin 500-400 MG CAPS per capsule Take 1 capsule by mouth every evening      mesalamine (ASACOL HD) 800 MG EC tablet Take 1 tablet (800 mg) by mouth 2 times daily 200 tablet 3    metoprolol succinate ER (TOPROL XL) 50 MG 24 hr tablet Take 1 1/2 tab (75mg) daily 135 tablet 3    rosuvastatin (CRESTOR) 40 MG tablet Take 1 tablet (40 mg) by mouth daily 100 tablet 3    tamsulosin (FLOMAX) 0.4 MG capsule Take 1 capsule (0.4 mg) by mouth daily 90 capsule 3    triamcinolone (KENALOG) 0.1 % external cream Apply topically 2 times daily 85.2 g 1    warfarin ANTICOAGULANT (COUMADIN) 5 MG tablet Take 1 tablet (5mg) everyday OR per INR clinic 90 tablet 1     No current facility-administered medications for this visit.         ROS:  ROS is done and is negative for general/constitutional, eye, ENT, Respiratory, cardiovascular, GI, , Skin, musculoskeletal except as noted elsewhere.  All other review of systems negative except as noted elsewhere.      OBJECTIVE:    No vital signs taken as is virtual visit.  GENERAL : Alert and oriented.  No distress detected.    NOSE: sounds congested.  RESP: No respiratory distress detected during phone conversation         ASSESSMENT/PLAN:    ASSESSMENT / PLAN:  (U07.1) Infection due to 2019 novel coronavirus  (primary encounter diagnosis)  Comment: has risk factors.  Gfr okay.   Plan: nirmatrelvir and ritonavir (PAXLOVID) 300         mg/100 mg therapy pack        Stop rosuvastatin and tamslosin while on Paxlovid.  Can restart in 3-4 days after completing.  Reviewed medication instructions and side effects. Follow up if experiences side effects.. I reviewed supportive care, otc meds to use if  needed, expected course, and signs of concern.  Follow up as needed or if does not improve within 5 day(s) or if worsens in any way.  Reviewed red flag symptoms and is to go to the ER if experiences any of these.    (I48.91) Atrial fibrillation, unspecified type (H)  Comment: hx of  Plan: advised to monitor for increased bruising or bleeding while on Paxlovid.    (E78.5) Hyperlipidemia LDL goal <100  Comment: hx of  Plan: hold rosuvastatin while on paxlovid    (C61) Prostate cancer (H)  Comment: hx of  Plan: hold tamsulosin while on paxlovid.    (E11.9) Type 2 diabetes mellitus without complication, without long-term current use of insulin (H)  Comment: hx of  Plan: pt to monitor sugars while ill and notify pcp if running too high or too low.        See [facility] for orders, medications, letters, patient instructions    Lina Dumont MD  9/7/2024, 7:55 AM      Phone call duration:  21 minutes    Originating Location (pt. Location): Home    Distant Location (provider location):  [facility] URGENT [facility]

## 2024-09-07 NOTE — PATIENT INSTRUCTIONS
Do NOT take your tamsulosin (Flomax) or your rosuvastatin (Crestor) while you are on Paxlovid.  You can restart these medicines 3 days after you finish the Paxlovid.

## 2024-09-13 DIAGNOSIS — G47.33 OBSTRUCTIVE SLEEP APNEA (ADULT) (PEDIATRIC): Primary | ICD-10-CM

## 2024-09-18 ENCOUNTER — TELEPHONE (OUTPATIENT)
Dept: PEDIATRICS | Facility: CLINIC | Age: 84
End: 2024-09-18
Payer: MEDICARE

## 2024-09-18 NOTE — TELEPHONE ENCOUNTER
It is not recommended to continue testing if pt is clinically improving and technically if it has been > 10 days and pt is feeling better, he does not need to isolate.  Those are the guidelines.  My personal recommendation is that he continue to mask in public until his test is negative or until he is completely asymptomatic, out of an abundance of caution.    I recommend he schedule an appt if symptoms are persisting.    Chris Flower MD

## 2024-09-18 NOTE — TELEPHONE ENCOUNTER
Patient called and stated he continues to test positive for covid at home.     He tested positive for covid initially on 9/6/24.     He had a virtual urgent care visit on 6/7/24 and was prescribed paxlovid. He states he completed the course of paxlovid.    He states he is feeling better, he still has a mild cough, feels tired, and a little weaker than before having covid. This is the first time he has had covid.    He denies chest pain, shortness of breath, confusion, worsening symptoms.    He asked if he is still contagious since he is testing positive. RN advised that he is contagious if testing positive for covid and that he could test positive for up to 90 days on home tests.    He asked what paxlovid does. RN provided medication education for patient. He verbalized understanding.     He states he is going on vacation mid-October and wants to feel better and be covid free by then.     He asked if he should be prescribed anything to make it go away quicker like another round of paxlovid?     Routing to PCP to please review and advise if patient should be seen again or if any additional medication should be prescribed. Thank you.    Johanny Simons, RN, BSN, PHN  [facility] [place] [facility]

## 2024-09-19 NOTE — TELEPHONE ENCOUNTER
Called the patient at [phone]   - Informed the patient of Dr. Flower's comments/recommendations   - Informed the patient that it is not recommended to continue testing if he is clinically improving and technically if it has been > 10 days and he is feeling better, he does not need to isolate   - Informed the patient that Dr. Flower recommends that he continue to mask in public until his test is negative or until he is completely asymptomatic, out of an abundance of caution   - Informed the patient to schedule an appointment if symptoms are persisting   - Patient verbalized understanding and agrees with the plan     Lluvia TOLEDO RN   [facility]

## 2024-09-26 ENCOUNTER — ANCILLARY PROCEDURE (OUTPATIENT)
Dept: ULTRASOUND IMAGING | Facility: CLINIC | Age: 84
End: 2024-09-26
Attending: SURGERY
Payer: MEDICARE

## 2024-09-26 ENCOUNTER — OFFICE VISIT (OUTPATIENT)
Dept: VASCULAR SURGERY | Facility: CLINIC | Age: 84
End: 2024-09-26
Attending: SURGERY
Payer: MEDICARE

## 2024-09-26 DIAGNOSIS — I83.812 VARICOSE VEINS OF LEFT LOWER EXTREMITY WITH PAIN: ICD-10-CM

## 2024-09-26 DIAGNOSIS — I83.812 VARICOSE VEINS OF LEFT LOWER EXTREMITY WITH PAIN: Primary | ICD-10-CM

## 2024-09-26 PROCEDURE — 93971 EXTREMITY STUDY: CPT | Mod: LT | Performed by: SURGERY

## 2024-09-26 PROCEDURE — 99212 OFFICE O/P EST SF 10 MIN: CPT | Performed by: SURGERY

## 2024-09-26 NOTE — LETTER
9/26/2024      Fausto Farr  [address]  [place] [address]      Dear Colleague,    Thank you for referring your patient, Fausto Farr, to the [facility] [place]. Please see a copy of my visit note below.    Mr. Fausto Farr returns to clinic today.  He is a very pleasant and active 84-year-old gentleman who we did left great saphenous vein ablation in February of this year.  We also did phlebectomies.    He tells me that the symptoms in his left lower extremity have improved significantly.  He continues to stay active.    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    I discussed the results with him and reassured him.    He can follow-up as needed.      Again, thank you for allowing me to participate in the care of your patient.        Sincerely,        Vini Wu MD

## 2024-09-26 NOTE — PROGRESS NOTES
Mr. Fausto Farr returns to clinic today.  He is a very pleasant and active 84-year-old gentleman who we did left great saphenous vein ablation in February of this year.  We also did phlebectomies.    He tells me that the symptoms in his left lower extremity have improved significantly.  He continues to stay active.    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    I discussed the results with him and reassured him.    He can follow-up as needed.

## 2024-09-27 ENCOUNTER — LAB (OUTPATIENT)
Dept: LAB | Facility: CLINIC | Age: 84
End: 2024-09-27
Payer: MEDICARE

## 2024-09-27 ENCOUNTER — ANTICOAGULATION THERAPY VISIT (OUTPATIENT)
Dept: ANTICOAGULATION | Facility: CLINIC | Age: 84
End: 2024-09-27

## 2024-09-27 DIAGNOSIS — Z95.2 S/P MITRAL VALVE REPLACEMENT: Primary | ICD-10-CM

## 2024-09-27 DIAGNOSIS — I48.11 LONGSTANDING PERSISTENT ATRIAL FIBRILLATION (H): ICD-10-CM

## 2024-09-27 DIAGNOSIS — Z95.2 S/P AORTIC VALVE REPLACEMENT: ICD-10-CM

## 2024-09-27 DIAGNOSIS — Z79.01 LONG TERM CURRENT USE OF ANTICOAGULANT THERAPY: ICD-10-CM

## 2024-09-27 DIAGNOSIS — Z95.2 S/P MITRAL VALVE REPLACEMENT: ICD-10-CM

## 2024-09-27 LAB — INR BLD: 5.9 (ref 0.9–1.1)

## 2024-09-27 PROCEDURE — 36416 COLLJ CAPILLARY BLOOD SPEC: CPT

## 2024-09-27 PROCEDURE — 85610 PROTHROMBIN TIME: CPT

## 2024-09-27 NOTE — PROGRESS NOTES
ANTICOAGULATION MANAGEMENT     Fausto Farr 84 year old male is on warfarin with supratherapeutic INR result. (Goal INR 2.5-3.5)    Recent labs: (last 7 days)     09/27/24  0903   INR 5.9*       ASSESSMENT     Source(s): Chart Review and Patient/Caregiver Call     Warfarin doses taken: Warfarin taken as instructed  Diet: Decreased greens/vitamin K in diet; plans to resume previous intake  Medication/supplement changes:  Paxlovid 5 day course (dates: 9/7/24 - 9/12/24) not a factor for today's INR result  New illness, injury, or hospitalization: Yes: Pt had COVID, tested positive 9/6/24, was positive until 9/26/24  Signs or symptoms of bleeding or clotting: No  Previous result: Therapeutic last 2(+) visits  Additional findings:  Patient will leave on vacation 10/11/24 for about 2 1/2 weeks  Patient had self increased his warfarin dose by 5.6% prior to his last INR check on 9/6/24       PLAN     Recommended plan for temporary change(s) affecting INR     Dosing Instructions: hold dose then decrease your warfarin dose (5.3% change) with next INR in 3 days       Summary  As of 9/27/2024      Full warfarin instructions:  9/27: Hold; Otherwise 5 mg every Mon, Wed, Fri; 7.5 mg all other days   Next INR check:  9/30/2024               Telephone call with Rlaph who verbalizes understanding and agrees to plan    Lab visit scheduled    Education provided: Please call back if any changes to your diet, medications or how you've been taking warfarin  Taking warfarin: Importance of taking warfarin as instructed  Symptom monitoring: monitoring for bleeding signs and symptoms and if you hit your head or have a bad fall seek emergency care  Contact [phone] with any changes, questions or concerns.     Plan made per [facility] anticoagulation protocol    Cece Rios RN  9/27/2024  Anticoagulation Clinic  [employer] for routing messages: joel DOWELL  [facility] patient phone line:  [phone]        _______________________________________________________________________     Anticoagulation Episode Summary       Current INR goal:  2.5-3.5   TTR:  40.3% (1 y)   Target end date:  Indefinite   Send INR reminders to:  SHANNA DOWELL    Indications    S/P mitral valve replacement [Z95.2]  Long term current use of anticoagulant therapy [Z79.01]  Longstanding persistent atrial fibrillation (H) [I48.11]             Comments:  Per 9/20/22 Cardio Note strict INR goal of 2.5-3.5. If INR falls below 2.5 at any time, needs to be bridged with Lovenox. consent to leave DVM              Anticoagulation Care Providers       Provider Role Specialty Phone number    Jodi Flower Mai, MD Referring Internal Medicine - Pediatrics [phone]

## 2024-09-30 ENCOUNTER — ANTICOAGULATION THERAPY VISIT (OUTPATIENT)
Dept: ANTICOAGULATION | Facility: CLINIC | Age: 84
End: 2024-09-30

## 2024-09-30 ENCOUNTER — LAB (OUTPATIENT)
Dept: LAB | Facility: CLINIC | Age: 84
End: 2024-09-30
Payer: MEDICARE

## 2024-09-30 DIAGNOSIS — Z95.2 S/P MITRAL VALVE REPLACEMENT: ICD-10-CM

## 2024-09-30 DIAGNOSIS — Z79.01 LONG TERM CURRENT USE OF ANTICOAGULANT THERAPY: ICD-10-CM

## 2024-09-30 DIAGNOSIS — I48.11 LONGSTANDING PERSISTENT ATRIAL FIBRILLATION (H): ICD-10-CM

## 2024-09-30 DIAGNOSIS — E11.9 DIABETES MELLITUS, TYPE 2 (H): ICD-10-CM

## 2024-09-30 DIAGNOSIS — Z95.2 S/P MITRAL VALVE REPLACEMENT: Primary | ICD-10-CM

## 2024-09-30 DIAGNOSIS — Z95.2 S/P AORTIC VALVE REPLACEMENT: ICD-10-CM

## 2024-09-30 DIAGNOSIS — I48.19 PERSISTENT ATRIAL FIBRILLATION (H): ICD-10-CM

## 2024-09-30 LAB
EST. AVERAGE GLUCOSE BLD GHB EST-MCNC: 151 MG/DL
HBA1C MFR BLD: 6.9 % (ref 0–5.6)
INR BLD: 4.2 (ref 0.9–1.1)

## 2024-09-30 PROCEDURE — 85610 PROTHROMBIN TIME: CPT

## 2024-09-30 PROCEDURE — 36415 COLL VENOUS BLD VENIPUNCTURE: CPT

## 2024-09-30 PROCEDURE — 83036 HEMOGLOBIN GLYCOSYLATED A1C: CPT

## 2024-09-30 RX ORDER — WARFARIN SODIUM 5 MG/1
TABLET ORAL
Qty: 120 TABLET | Refills: 0 | Status: SHIPPED | OUTPATIENT
Start: 2024-09-30

## 2024-09-30 NOTE — PROGRESS NOTES
"ANTICOAGULATION MANAGEMENT     Fausto Farr 84 year old male is on warfarin with supratherapeutic INR result. (Goal INR 2.5-3.5)    Recent labs: (last 7 days)     09/30/24  1041   INR 4.2*           ASSESSMENT     Source(s): Chart Review and Patient/Caregiver Call     Warfarin doses taken: Held for 1 day  recently which may be affecting INR  Diet: No new diet changes identified  Medication/supplement changes: None noted  New illness, injury, or hospitalization: Yes: recovering from Covid, patient reports he is feeling well.  Signs or symptoms of bleeding or clotting: Yes: patient reports \"spots on his arms\" when his INR gets high.  Previous result: Supratherapeutic  Additional findings: Refill needed today. Fausto meets all criteria for refill (current [facility] referral, visit with referring provider/group in last 15 months unless directed to return in 2 years in last referring provider visit note, lab monitoring up to date or not exceeding 2 weeks overdue). Rx instructions and quantity supplied updated to match patient's current dosing plan.  90 day supply with 0 refills granted per [facility] protocol  and warfarin maintenance dose was increased 5% at last visit.  Last office visit was 11/21/2023, patient has wellness exam scheduled on 11/4/24.    Patient reports he is leaving on 10/11/24, going to [place] and [place] for about 2 1/2 weeks.   PLAN     Recommended plan for temporary change(s) affecting INR     Dosing Instructions: partial hold then continue your current warfarin dose with next INR in 4 days       Summary  As of 9/30/2024      Full warfarin instructions:  9/30: 2.5 mg; Otherwise 5 mg every Mon, Wed, Fri; 7.5 mg all other days   Next INR check:  10/4/2024               Telephone call with Ralph who verbalizes understanding and agrees to plan and who agrees to plan and repeated back plan correctly    Lab visit scheduled    Education provided: Taking warfarin: Importance of taking warfarin as instructed  Dietary " considerations: importance of consistent vitamin K intake    Plan made per [facility] anticoagulation protocol    Aure Sampson RN  9/30/2024  Anticoagulation Clinic  [facility] for routing messages: joel DOWELL  [facility] patient phone line: [phone]        _______________________________________________________________________     Anticoagulation Episode Summary       Current INR goal:  2.5-3.5   TTR:  40.2% (1 y)   Target end date:  Indefinite   Send INR reminders to:  SHANNA DOWELL    Indications    S/P mitral valve replacement [Z95.2]  Long term current use of anticoagulant therapy [Z79.01]  Longstanding persistent atrial fibrillation (H) [I48.11]             Comments:  Per 9/20/22 Cardio Note strict INR goal of 2.5-3.5. If INR falls below 2.5 at any time, needs to be bridged with Lovenox. consent to leave DVM              Anticoagulation Care Providers       Provider Role Specialty Phone number    Jodi Flower Mai, MD Referring Internal Medicine - Pediatrics [phone]

## 2024-10-01 NOTE — RESULT ENCOUNTER NOTE
Dear Ralph,    Your A1C is at still at goal at < 7.  At this time, I do not recommend any changes to your current plan of care.    Please feel free to call with any questions.  Otherwise, we can discuss further at your upcoming appointment.    Sincerely,    Chris Flower MD

## 2024-10-04 ENCOUNTER — ANTICOAGULATION THERAPY VISIT (OUTPATIENT)
Dept: ANTICOAGULATION | Facility: CLINIC | Age: 84
End: 2024-10-04

## 2024-10-04 ENCOUNTER — LAB (OUTPATIENT)
Dept: LAB | Facility: CLINIC | Age: 84
End: 2024-10-04
Payer: MEDICARE

## 2024-10-04 DIAGNOSIS — Z95.2 S/P MITRAL VALVE REPLACEMENT: Primary | ICD-10-CM

## 2024-10-04 DIAGNOSIS — Z95.2 S/P AORTIC VALVE REPLACEMENT: ICD-10-CM

## 2024-10-04 DIAGNOSIS — Z79.01 LONG TERM CURRENT USE OF ANTICOAGULANT THERAPY: ICD-10-CM

## 2024-10-04 DIAGNOSIS — I48.11 LONGSTANDING PERSISTENT ATRIAL FIBRILLATION (H): ICD-10-CM

## 2024-10-04 DIAGNOSIS — Z95.2 S/P MITRAL VALVE REPLACEMENT: ICD-10-CM

## 2024-10-04 LAB — INR BLD: 6.4 (ref 0.9–1.1)

## 2024-10-04 PROCEDURE — 36416 COLLJ CAPILLARY BLOOD SPEC: CPT

## 2024-10-04 PROCEDURE — 85610 PROTHROMBIN TIME: CPT

## 2024-10-04 NOTE — PROGRESS NOTES
ANTICOAGULATION MANAGEMENT     Fausto Farr 84 year old male is on warfarin with supratherapeutic INR result. (Goal INR 2.5-3.5)    Recent labs: (last 7 days)     10/04/24  1228   INR 6.4*       ASSESSMENT     Source(s): Chart Review and Patient/Caregiver Call     Warfarin doses taken: Warfarin taken as instructed  Diet: No new diet changes identified. Has been eating a lot of fresh veggies recently.  Planning on having stirfry this evening which should help to bring INR down.  Medication/supplement changes: None noted  New illness, injury, or hospitalization: No  Signs or symptoms of bleeding or clotting: No  Previous result: Supratherapeutic  Additional findings: Will be leaving for 2.5 weeks to travel to [place] and [place].  Leaving 10/11 or 10/12.       PLAN     Recommended plan for no diet, medication or health factor changes affecting INR     Dosing Instructions: hold dose then decrease your warfarin dose (11% change) with next INR in 3 days       Summary  As of 10/4/2024      Full warfarin instructions:  10/4: Hold; Otherwise 7.5 mg every Sun, Thu; 5 mg all other days   Next INR check:  10/7/2024               Telephone call with Ralph who agrees to plan and repeated back plan correctly    Lab visit scheduled    Education provided: Please call back if any changes to your diet, medications or how you've been taking warfarin  Goal range and lab monitoring: goal range and significance of current result  Symptom monitoring: monitoring for bleeding signs and symptoms and when to seek medical attention/emergency care    Plan made per [facility] anticoagulation protocol    Ruthann Sanders RN  10/4/2024  Anticoagulation Clinic  [employer] for routing messages: joel DOWELL  [facility] patient phone line: [phone]        _______________________________________________________________________     Anticoagulation Episode Summary       Current INR goal:  2.5-3.5   TTR:  40.2% (1 y)   Target end date:  Indefinite   Send INR reminders  to:  SHANNA DOWELL    Indications    S/P mitral valve replacement [Z95.2]  Long term current use of anticoagulant therapy [Z79.01]  Longstanding persistent atrial fibrillation (H) [I48.11]             Comments:  Per 9/20/22 Cardio Note strict INR goal of 2.5-3.5. If INR falls below 2.5 at any time, needs to be bridged with Lovenox. consent to leave DVM              Anticoagulation Care Providers       Provider Role Specialty Phone number    Jodi Flower Mai, MD Referring Internal Medicine - Pediatrics [phone]

## 2024-10-07 ENCOUNTER — LAB (OUTPATIENT)
Dept: LAB | Facility: CLINIC | Age: 84
End: 2024-10-07
Payer: MEDICARE

## 2024-10-07 ENCOUNTER — ANTICOAGULATION THERAPY VISIT (OUTPATIENT)
Dept: ANTICOAGULATION | Facility: CLINIC | Age: 84
End: 2024-10-07

## 2024-10-07 DIAGNOSIS — Z79.01 LONG TERM CURRENT USE OF ANTICOAGULANT THERAPY: ICD-10-CM

## 2024-10-07 DIAGNOSIS — I48.11 LONGSTANDING PERSISTENT ATRIAL FIBRILLATION (H): ICD-10-CM

## 2024-10-07 DIAGNOSIS — Z95.2 S/P MITRAL VALVE REPLACEMENT: ICD-10-CM

## 2024-10-07 DIAGNOSIS — Z95.2 S/P MITRAL VALVE REPLACEMENT: Primary | ICD-10-CM

## 2024-10-07 DIAGNOSIS — Z95.2 S/P AORTIC VALVE REPLACEMENT: ICD-10-CM

## 2024-10-07 LAB — INR BLD: 2 (ref 0.9–1.1)

## 2024-10-07 PROCEDURE — 85610 PROTHROMBIN TIME: CPT

## 2024-10-07 PROCEDURE — 36416 COLLJ CAPILLARY BLOOD SPEC: CPT

## 2024-10-07 NOTE — PROGRESS NOTES
ANTICOAGULATION MANAGEMENT     Fausto Farr 84 year old male is on warfarin with subtherapeutic INR result. (Goal INR 2.5-3.5)    Recent labs: (last 7 days)     10/07/24  1503   INR 2.0*       ASSESSMENT     Source(s): Chart Review and Patient/Caregiver Call     Warfarin doses taken: Held on Friday as advised, but then Less warfarin taken than planned on Sunday, which may be affecting INR  Diet: Increased greens/vitamin K in diet; plans to resume previous intake, had two large servings of broccoli over the weekend  Medication/supplement changes: None noted  New illness, injury, or hospitalization: No  Signs or symptoms of bleeding or clotting: No  Previous result: Supratherapeutic  Additional findings:  Will be leaving for [place] and [place] on 10/11/24 and be gone for 2.5 weeks.    Consult with Irma Salas [facility]: He was recently supratherapeutic for 10 days. No bridge today, but will boost, despite just being supratherapeutic on Friday, due to no bridge. Okay for 7.5 mg x 1 today. Move his maintenance dose up 6.2% to help sustain today's boost. Recheck Friday.    PLAN     Recommended plan for temporary change(s) affecting INR     Dosing Instructions: booster dose then Increase your warfarin dose (6.2% change) with next INR in 4 days       Summary  As of 10/7/2024      Full warfarin instructions:  10/7: 7.5 mg; Otherwise 7.5 mg every Sun, Tue, Thu; 5 mg all other days   Next INR check:  10/11/2024               Telephone call with Ralph who agrees to plan and repeated back plan correctly    Lab visit already scheduled for Fri, 10/11/24    Education provided: Please call back if any changes to your diet, medications or how you've been taking warfarin  Taking warfarin: Importance of taking warfarin as instructed  Dietary considerations: importance of consistent vitamin K intake  Contact [phone] with any changes, questions or concerns.     Plan made with [facility] Pharmacist Irma Woodard,  RN  10/7/2024  Anticoagulation Clinic  [facility] for routing messages: joel DOWELL  [facility] patient phone line: [phone]        _______________________________________________________________________     Anticoagulation Episode Summary       Current INR goal:  2.5-3.5   TTR:  40.4% (1 y)   Target end date:  Indefinite   Send INR reminders to:  SHANNA DOWELL    Indications    S/P mitral valve replacement [Z95.2]  Long term current use of anticoagulant therapy [Z79.01]  Longstanding persistent atrial fibrillation (H) [I48.11]             Comments:  Per 9/20/22 Cardio Note strict INR goal of 2.5-3.5. If INR falls below 2.5 at any time, needs to be bridged with Lovenox. consent to leave DVM              Anticoagulation Care Providers       Provider Role Specialty Phone number    Jodi Flower Mai, MD Referring Internal Medicine - Pediatrics [phone]

## 2024-10-11 ENCOUNTER — ANTICOAGULATION THERAPY VISIT (OUTPATIENT)
Dept: ANTICOAGULATION | Facility: CLINIC | Age: 84
End: 2024-10-11

## 2024-10-11 ENCOUNTER — TELEPHONE (OUTPATIENT)
Dept: PEDIATRICS | Facility: CLINIC | Age: 84
End: 2024-10-11

## 2024-10-11 ENCOUNTER — LAB (OUTPATIENT)
Dept: LAB | Facility: CLINIC | Age: 84
End: 2024-10-11
Payer: MEDICARE

## 2024-10-11 DIAGNOSIS — Z95.2 S/P MITRAL VALVE REPLACEMENT: ICD-10-CM

## 2024-10-11 DIAGNOSIS — Z95.2 S/P MITRAL VALVE REPLACEMENT: Primary | ICD-10-CM

## 2024-10-11 DIAGNOSIS — Z79.01 LONG TERM CURRENT USE OF ANTICOAGULANT THERAPY: ICD-10-CM

## 2024-10-11 DIAGNOSIS — Z95.2 S/P AORTIC VALVE REPLACEMENT: ICD-10-CM

## 2024-10-11 DIAGNOSIS — I48.11 LONGSTANDING PERSISTENT ATRIAL FIBRILLATION (H): ICD-10-CM

## 2024-10-11 LAB — INR BLD: 1.5 (ref 0.9–1.1)

## 2024-10-11 PROCEDURE — 85610 PROTHROMBIN TIME: CPT

## 2024-10-11 PROCEDURE — 36416 COLLJ CAPILLARY BLOOD SPEC: CPT

## 2024-10-11 RX ORDER — ENOXAPARIN SODIUM 100 MG/ML
90 INJECTION SUBCUTANEOUS EVERY 12 HOURS
Qty: 28 ML | Refills: 1 | Status: ON HOLD | OUTPATIENT
Start: 2024-10-11

## 2024-10-11 NOTE — CONFIDENTIAL NOTE
"Estimated Creatinine Clearance: 68.6 mL/min (based on SCr of 0.85 mg/dL).  Estimated body mass index is 33.78 kg/m  as calculated from the following:    Height as of 9/3/24: 1.651 m (5' 5\").    Weight as of 9/3/24: 92.1 kg (203 lb).    Enoxaparin 90 mg subcutaneous Q12h until INR > /= 2.5 for MVR.    Irma Slaas, FannyD, BCPS      "

## 2024-10-11 NOTE — TELEPHONE ENCOUNTER
Call was returned to the patient, see 10/11/2024 Anticoagulation encounter for warfarin dosing instruction.  Cece Rios RN, BSN  Anticoagulation Clinic

## 2024-10-11 NOTE — PROGRESS NOTES
ANTICOAGULATION MANAGEMENT     Fausto Farr 84 year old male is on warfarin with subtherapeutic INR result. (Goal INR 2.5-3.5)    Recent labs: (last 7 days)     10/11/24  1113   INR 1.5*       ASSESSMENT     Source(s): Chart Review and Patient/Caregiver Call     Warfarin doses taken: Missed dose(s) may be affecting INR, Patient reports missing his warfarin dose 10/10/24 and confirms taking the warfarin boost dose 10/7/24  Diet: No new diet changes identified  Medication/supplement changes:  doxycycline 5 day course (dates: 10/10/24 - 10/14/24) Tetracyclines may enhance the anticoagulant effect of Vitamin K Antagonists  New illness, injury, or hospitalization: Yes: Patient seen in Urgent Care 10/10/24, has pneumonia  Signs or symptoms of bleeding or clotting: No  Previous result: Subtherapeutic, patient has not yet utilized current warfarin maintenance dose  Additional findings: Bridging with Enoxaparin until INR >= 2.5, patient reports he had 1 Lovenox injection at home, he did take the injection when he arrived home after his INR lab appointment today. Rx for Lovenox sent to pharmacy for patient to continue Lovenox  Patient reports he canceled his vacation since he has pneumonia       PLAN     Recommended plan for temporary change(s) affecting INR     Dosing Instructions: booster dose then continue your current warfarin dose Start bridging with Enoxaparin with next INR in 4 days       Summary  As of 10/11/2024      Full warfarin instructions:  10/11: 10 mg; Otherwise 7.5 mg every Sun, Tue, Thu; 5 mg all other days   Next INR check:  10/15/2024               Telephone call with Ralph who agrees to plan and repeated back plan correctly    Lab visit scheduled    Education provided: Please call back if any changes to your diet, medications or how you've been taking warfarin  Contact [phone] with any changes, questions or concerns.     Plan made with [facility] Pharmacist Irma Rios,  RN  10/11/2024  Anticoagulation Clinic  [facility] for routing messages: joel DOWELL  [facility] patient phone line: [phone]        _______________________________________________________________________     Anticoagulation Episode Summary       Current INR goal:  2.5-3.5   TTR:  40.5% (1 y)   Target end date:  Indefinite   Send INR reminders to:  SHANNA DOWELL    Indications    S/P mitral valve replacement [Z95.2]  Long term current use of anticoagulant therapy [Z79.01]  Longstanding persistent atrial fibrillation (H) [I48.11]             Comments:  Per 9/20/22 Cardio Note strict INR goal of 2.5-3.5. If INR falls below 2.5 at any time, needs to be bridged with Lovenox. consent to leave DVM              Anticoagulation Care Providers       Provider Role Specialty Phone number    Jodi Flower Mai, MD Referring Internal Medicine - Pediatrics [phone]

## 2024-10-11 NOTE — TELEPHONE ENCOUNTER
Patient Returning Call    Reason for call:  Pt would like a call back asap to discuss INR result of 1.5    Information relayed to patient:  Will leave TE for call back    Patient has additional questions:  No      Could we send this information to you in [employer] [place] or would you prefer to receive a phone call?:   Patient would prefer a phone call   Okay to leave a detailed message?: Yes at Home number on file [phone] (home)

## 2024-10-15 ENCOUNTER — ANTICOAGULATION THERAPY VISIT (OUTPATIENT)
Dept: ANTICOAGULATION | Facility: CLINIC | Age: 84
End: 2024-10-15

## 2024-10-15 ENCOUNTER — LAB (OUTPATIENT)
Dept: LAB | Facility: CLINIC | Age: 84
End: 2024-10-15
Payer: MEDICARE

## 2024-10-15 DIAGNOSIS — Z95.2 S/P MITRAL VALVE REPLACEMENT: ICD-10-CM

## 2024-10-15 DIAGNOSIS — Z95.2 S/P AORTIC VALVE REPLACEMENT: ICD-10-CM

## 2024-10-15 DIAGNOSIS — Z79.01 LONG TERM CURRENT USE OF ANTICOAGULANT THERAPY: ICD-10-CM

## 2024-10-15 DIAGNOSIS — I48.11 LONGSTANDING PERSISTENT ATRIAL FIBRILLATION (H): ICD-10-CM

## 2024-10-15 DIAGNOSIS — Z95.2 S/P MITRAL VALVE REPLACEMENT: Primary | ICD-10-CM

## 2024-10-15 LAB — INR BLD: 2.8 (ref 0.9–1.1)

## 2024-10-15 PROCEDURE — 85610 PROTHROMBIN TIME: CPT

## 2024-10-15 PROCEDURE — 36416 COLLJ CAPILLARY BLOOD SPEC: CPT

## 2024-10-15 NOTE — PROGRESS NOTES
ANTICOAGULATION MANAGEMENT     Fausto Farr 84 year old male is on warfarin with therapeutic INR result. (Goal INR 2.5-3.5)    Recent labs: (last 7 days)     10/15/24  1446   INR 2.8*       ASSESSMENT     Source(s): Chart Review and Patient/Caregiver Call     Warfarin doses taken: Warfarin taken as instructed  Diet: No new diet changes identified  Medication/supplement changes: None noted  New illness, injury, or hospitalization: No  Signs or symptoms of bleeding or clotting: No  Previous result: Subtherapeutic  Additional findings:  Was supposed to be bridging  but pharmacy only dispensed 5 syringes (and he had 1 at home) so finished enoxaparin on sunday       PLAN     Recommended plan for no diet, medication or health factor changes affecting INR     Dosing Instructions: Continue your current warfarin dose with next INR in 1 week       Summary  As of 10/15/2024      Full warfarin instructions:  7.5 mg every Sun, Tue, Thu; 5 mg all other days   Next INR check:  10/22/2024               Telephone call with Ralph who verbalizes understanding and agrees to plan    Lab visit scheduled    Education provided: Please call back if any changes to your diet, medications or how you've been taking warfarin    Plan made per [facility] anticoagulation protocol    Jame Chadwick RN  10/15/2024  Anticoagulation Clinic  [employer] for routing messages: joel DOWELL  [facility] patient phone line: [phone]        _______________________________________________________________________     Anticoagulation Episode Summary       Current INR goal:  2.5-3.5   TTR:  40.6% (1 y)   Target end date:  Indefinite   Send INR reminders to:  SHANNA DOWELL    Indications    S/P mitral valve replacement [Z95.2]  Long term current use of anticoagulant therapy [Z79.01]  Longstanding persistent atrial fibrillation (H) [I48.11]             Comments:  Per 9/20/22 Cardio Note strict INR goal of 2.5-3.5. If INR falls below 2.5 at any time, needs to  be bridged with Lovenox. consent to leave DVM              Anticoagulation Care Providers       Provider Role Specialty Phone number    Jodi Flower Mai, MD Referring Internal Medicine - Pediatrics [phone]

## 2024-10-16 ENCOUNTER — PATIENT OUTREACH (OUTPATIENT)
Dept: CARE COORDINATION | Facility: CLINIC | Age: 84
End: 2024-10-16
Payer: MEDICARE

## 2024-10-16 NOTE — PROGRESS NOTES
[facility] Medicare ACO Reach Population - Proactive Outreach    Background: Patient outreach conducted proactively to support health maintenance initiatives and identify any opportunities to integrated Care Coordination assistance in patient-centered goals.      Patient agreeable to scheduling Hospital/ED follow up? No     If No, CC team member encouraged patient to contact [facility] at [phone] to schedule an appointment in the future, or schedule through [facility]    Patient accepts CC: No, patient is not interested at this time.   Karyna Hickman RN  [facility]

## 2024-10-22 ENCOUNTER — LAB (OUTPATIENT)
Dept: LAB | Facility: CLINIC | Age: 84
End: 2024-10-22
Payer: MEDICARE

## 2024-10-22 ENCOUNTER — ANTICOAGULATION THERAPY VISIT (OUTPATIENT)
Dept: ANTICOAGULATION | Facility: CLINIC | Age: 84
End: 2024-10-22

## 2024-10-22 DIAGNOSIS — Z95.2 S/P MITRAL VALVE REPLACEMENT: Primary | ICD-10-CM

## 2024-10-22 DIAGNOSIS — Z95.2 S/P MITRAL VALVE REPLACEMENT: ICD-10-CM

## 2024-10-22 DIAGNOSIS — I48.11 LONGSTANDING PERSISTENT ATRIAL FIBRILLATION (H): ICD-10-CM

## 2024-10-22 DIAGNOSIS — Z95.2 S/P AORTIC VALVE REPLACEMENT: ICD-10-CM

## 2024-10-22 DIAGNOSIS — Z79.01 LONG TERM CURRENT USE OF ANTICOAGULANT THERAPY: ICD-10-CM

## 2024-10-22 LAB — INR BLD: 3.5 (ref 0.9–1.1)

## 2024-10-22 PROCEDURE — 85610 PROTHROMBIN TIME: CPT

## 2024-10-22 PROCEDURE — 36415 COLL VENOUS BLD VENIPUNCTURE: CPT

## 2024-10-22 NOTE — PROGRESS NOTES
ANTICOAGULATION MANAGEMENT     Fausto Farr 84 year old male is on warfarin with therapeutic INR result. (Goal INR 2.5-3.5)    Recent labs: (last 7 days)     10/22/24  1054   INR 3.5*       ASSESSMENT     Source(s): Chart Review and Patient/Caregiver Call     Warfarin doses taken: Warfarin taken as instructed - verbalized correct dosing the last week  Diet: No new diet changes identified  Medication/supplement changes: None noted  New illness, injury, or hospitalization: No  Signs or symptoms of bleeding or clotting: No  Previous result: Therapeutic last visit; previously outside of goal range  Additional findings: None       PLAN     Recommended plan for no diet, medication or health factor changes affecting INR     Dosing Instructions: Continue your current warfarin dose with next INR in 2 weeks, Ralph prefers 1 week follow up since today's result is on the upper end of goal range.     Summary  As of 10/22/2024      Full warfarin instructions:  7.5 mg every Sun, Tue, Thu; 5 mg all other days   Next INR check:  10/29/2024               Telephone call with Ralph who verbalizes understanding and agrees to plan    Lab visit scheduled    Education provided: Please call back if any changes to your diet, medications or how you've been taking warfarin    Plan made per [facility] anticoagulation protocol    Maria Luz Rios RN  10/22/2024  Anticoagulation Clinic  [employer] for routing messages: joel DOWELL  [facility] patient phone line: [phone]        _______________________________________________________________________     Anticoagulation Episode Summary       Current INR goal:  2.5-3.5   TTR:  41.0% (1 y)   Target end date:  Indefinite   Send INR reminders to:  SHANNA DOWELL    Indications    S/P mitral valve replacement [Z95.2]  Long term current use of anticoagulant therapy [Z79.01]  Longstanding persistent atrial fibrillation (H) [I48.11]             Comments:  Per 9/20/22 Cardio Note strict INR goal of  2.5-3.5. If INR falls below 2.5 at any time, needs to be bridged with Lovenox. consent to leave DVM              Anticoagulation Care Providers       Provider Role Specialty Phone number    Jodi Flower Mai, MD Referring Internal Medicine - Pediatrics [phone]

## 2024-10-27 ENCOUNTER — TRANSFERRED RECORDS (OUTPATIENT)
Dept: HEALTH INFORMATION MANAGEMENT | Facility: CLINIC | Age: 84
End: 2024-10-27
Payer: MEDICARE

## 2024-10-29 ENCOUNTER — ANTICOAGULATION THERAPY VISIT (OUTPATIENT)
Dept: ANTICOAGULATION | Facility: CLINIC | Age: 84
End: 2024-10-29

## 2024-10-29 ENCOUNTER — LAB (OUTPATIENT)
Dept: LAB | Facility: CLINIC | Age: 84
End: 2024-10-29
Payer: MEDICARE

## 2024-10-29 DIAGNOSIS — Z95.2 S/P MITRAL VALVE REPLACEMENT: ICD-10-CM

## 2024-10-29 DIAGNOSIS — Z79.01 LONG TERM CURRENT USE OF ANTICOAGULANT THERAPY: ICD-10-CM

## 2024-10-29 DIAGNOSIS — I47.29 NSVT (NONSUSTAINED VENTRICULAR TACHYCARDIA) (H): ICD-10-CM

## 2024-10-29 DIAGNOSIS — I50.32 CHRONIC DIASTOLIC CONGESTIVE HEART FAILURE (H): ICD-10-CM

## 2024-10-29 DIAGNOSIS — Z95.2 S/P MITRAL VALVE REPLACEMENT: Primary | ICD-10-CM

## 2024-10-29 DIAGNOSIS — Z95.2 S/P AORTIC VALVE REPLACEMENT: ICD-10-CM

## 2024-10-29 DIAGNOSIS — I25.10 CORONARY ARTERY DISEASE WITHOUT ANGINA PECTORIS, UNSPECIFIED VESSEL OR LESION TYPE, UNSPECIFIED WHETHER NATIVE OR TRANSPLANTED HEART: ICD-10-CM

## 2024-10-29 DIAGNOSIS — I48.11 LONGSTANDING PERSISTENT ATRIAL FIBRILLATION (H): ICD-10-CM

## 2024-10-29 LAB — INR BLD: 3.9 (ref 0.9–1.1)

## 2024-10-29 PROCEDURE — 36416 COLLJ CAPILLARY BLOOD SPEC: CPT

## 2024-10-29 PROCEDURE — 85610 PROTHROMBIN TIME: CPT

## 2024-10-29 NOTE — PROGRESS NOTES
"ANTICOAGULATION MANAGEMENT     Fausto Farr 84 year old male is on warfarin with supratherapeutic INR result. (Goal INR 2.5-3.5)    Recent labs: (last 7 days)     10/29/24  1016   INR 3.9*       ASSESSMENT     Source(s): Chart Review and Patient/Caregiver Call     Warfarin doses taken: Warfarin taken as instructed  Diet:  patient reports overall decreased appetite; however, no change in vitamin K intake. Patient also reports he \"forces\" himself to eat.  Medication/supplement changes: patient reports he is currently taking 2 tablets of tylenol in a.m. and 2 tablets in p.m. for hamstring pain.  New illness, injury, or hospitalization: Yes: patient reports he \"pulled\" his hamstring doing yard work. Patient reports he saw orthopedic provider, physical therapy has been ordered.  Signs or symptoms of bleeding or clotting: No  Previous result: Therapeutic last 2(+) visits  Additional findings:  patient prefers warfarin maintenance dose reduction versus increasing greens, this is reasonable given hamstring injury and pain are likely to heal slowly.  Patient also reports he has lost 20 pounds over the course of the year.     PLAN     Recommended plan for temporary change(s) and ongoing change(s) affecting INR     Dosing Instructions: decrease your warfarin dose (5.9% change) with next INR in 10 days ; however, patient has wellness exam scheduled on 11/4/24, would like INR checked then.      Summary  As of 10/29/2024      Full warfarin instructions:  7.5 mg every Sun, Thu; 5 mg all other days   Next INR check:  11/4/2024               Telephone call with Ralph who verbalizes understanding and agrees to plan and who agrees to plan and repeated back plan correctly    Check at provider office visit    Education provided: Taking warfarin: Importance of taking warfarin as instructed  Dietary considerations: importance of consistent vitamin K intake  Also, how pain/inflammation/injury can affect INR    Plan made per [facility] " anticoagulation protocol    Aure Sampson RN  10/29/2024  Anticoagulation Clinic  [facility] for routing messages: joel DOWELL  [facility] patient phone line: [phone]        _______________________________________________________________________     Anticoagulation Episode Summary       Current INR goal:  2.5-3.5   TTR:  40.8% (1 y)   Target end date:  Indefinite   Send INR reminders to:  SHANNA DOWELL    Indications    S/P mitral valve replacement [Z95.2]  Long term current use of anticoagulant therapy [Z79.01]  Longstanding persistent atrial fibrillation (H) [I48.11]             Comments:  Per 9/20/22 Cardio Note strict INR goal of 2.5-3.5. If INR falls below 2.5 at any time, needs to be bridged with Lovenox. consent to leave DVM              Anticoagulation Care Providers       Provider Role Specialty Phone number    Jodi Flower Mai, MD Referring Internal Medicine - Pediatrics [phone]

## 2024-10-30 DIAGNOSIS — R33.9 URINARY RETENTION: ICD-10-CM

## 2024-10-30 DIAGNOSIS — E78.2 MIXED HYPERLIPIDEMIA: ICD-10-CM

## 2024-10-30 RX ORDER — ROSUVASTATIN CALCIUM 40 MG/1
40 TABLET, COATED ORAL DAILY
Qty: 100 TABLET | Refills: 0 | Status: SHIPPED | OUTPATIENT
Start: 2024-10-30 | End: 2024-11-04

## 2024-10-30 RX ORDER — METOPROLOL SUCCINATE 50 MG/1
TABLET, EXTENDED RELEASE ORAL
Qty: 135 TABLET | Refills: 0 | Status: SHIPPED | OUTPATIENT
Start: 2024-10-30 | End: 2024-11-04

## 2024-10-31 RX ORDER — TAMSULOSIN HYDROCHLORIDE 0.4 MG/1
0.4 CAPSULE ORAL DAILY
Qty: 90 CAPSULE | Refills: 0 | Status: SHIPPED | OUTPATIENT
Start: 2024-10-31 | End: 2024-11-04

## 2024-11-04 ENCOUNTER — OFFICE VISIT (OUTPATIENT)
Dept: PEDIATRICS | Facility: CLINIC | Age: 84
End: 2024-11-04
Payer: MEDICARE

## 2024-11-04 ENCOUNTER — ANTICOAGULATION THERAPY VISIT (OUTPATIENT)
Dept: ANTICOAGULATION | Facility: CLINIC | Age: 84
End: 2024-11-04

## 2024-11-04 ENCOUNTER — LAB (OUTPATIENT)
Dept: LAB | Facility: CLINIC | Age: 84
End: 2024-11-04
Payer: MEDICARE

## 2024-11-04 VITALS
OXYGEN SATURATION: 97 % | HEART RATE: 67 BPM | DIASTOLIC BLOOD PRESSURE: 70 MMHG | BODY MASS INDEX: 33.89 KG/M2 | RESPIRATION RATE: 14 BRPM | SYSTOLIC BLOOD PRESSURE: 114 MMHG | HEIGHT: 65 IN | TEMPERATURE: 96.6 F | WEIGHT: 203.4 LBS

## 2024-11-04 DIAGNOSIS — J32.9 SINUSITIS, UNSPECIFIED CHRONICITY, UNSPECIFIED LOCATION: ICD-10-CM

## 2024-11-04 DIAGNOSIS — Z95.2 S/P MITRAL VALVE REPLACEMENT: ICD-10-CM

## 2024-11-04 DIAGNOSIS — I25.10 CORONARY ARTERY DISEASE WITHOUT ANGINA PECTORIS, UNSPECIFIED VESSEL OR LESION TYPE, UNSPECIFIED WHETHER NATIVE OR TRANSPLANTED HEART: ICD-10-CM

## 2024-11-04 DIAGNOSIS — I71.40 ABDOMINAL AORTIC ANEURYSM (AAA) WITHOUT RUPTURE, UNSPECIFIED PART (H): ICD-10-CM

## 2024-11-04 DIAGNOSIS — I50.32 CHRONIC DIASTOLIC CONGESTIVE HEART FAILURE (H): ICD-10-CM

## 2024-11-04 DIAGNOSIS — Z87.39 HISTORY OF SEPTIC ARTHRITIS: ICD-10-CM

## 2024-11-04 DIAGNOSIS — R33.9 URINARY RETENTION: ICD-10-CM

## 2024-11-04 DIAGNOSIS — Z95.2 S/P AORTIC VALVE REPLACEMENT: ICD-10-CM

## 2024-11-04 DIAGNOSIS — I48.11 LONGSTANDING PERSISTENT ATRIAL FIBRILLATION (H): ICD-10-CM

## 2024-11-04 DIAGNOSIS — Z95.2 S/P MITRAL VALVE REPLACEMENT: Primary | ICD-10-CM

## 2024-11-04 DIAGNOSIS — I47.29 NSVT (NONSUSTAINED VENTRICULAR TACHYCARDIA) (H): ICD-10-CM

## 2024-11-04 DIAGNOSIS — K51.20 ULCERATIVE PROCTITIS WITHOUT COMPLICATION (H): ICD-10-CM

## 2024-11-04 DIAGNOSIS — I48.19 PERSISTENT ATRIAL FIBRILLATION (H): ICD-10-CM

## 2024-11-04 DIAGNOSIS — E66.01 MORBID OBESITY (H): ICD-10-CM

## 2024-11-04 DIAGNOSIS — Z00.00 ENCOUNTER FOR MEDICARE ANNUAL WELLNESS EXAM: Primary | ICD-10-CM

## 2024-11-04 DIAGNOSIS — E11.9 TYPE 2 DIABETES MELLITUS WITHOUT COMPLICATION, WITHOUT LONG-TERM CURRENT USE OF INSULIN (H): ICD-10-CM

## 2024-11-04 DIAGNOSIS — C61 MALIGNANT NEOPLASM OF PROSTATE (H): ICD-10-CM

## 2024-11-04 DIAGNOSIS — E78.2 MIXED HYPERLIPIDEMIA: ICD-10-CM

## 2024-11-04 DIAGNOSIS — Z79.01 LONG TERM CURRENT USE OF ANTICOAGULANT THERAPY: ICD-10-CM

## 2024-11-04 PROBLEM — M00.9: Status: RESOLVED | Noted: 2023-01-06 | Resolved: 2024-11-04

## 2024-11-04 LAB — INR BLD: 2.3 (ref 0.9–1.1)

## 2024-11-04 PROCEDURE — 99214 OFFICE O/P EST MOD 30 MIN: CPT | Mod: 25 | Performed by: INTERNAL MEDICINE

## 2024-11-04 PROCEDURE — 36416 COLLJ CAPILLARY BLOOD SPEC: CPT

## 2024-11-04 PROCEDURE — G0439 PPPS, SUBSEQ VISIT: HCPCS | Performed by: INTERNAL MEDICINE

## 2024-11-04 PROCEDURE — 85610 PROTHROMBIN TIME: CPT

## 2024-11-04 PROCEDURE — G0008 ADMIN INFLUENZA VIRUS VAC: HCPCS | Performed by: INTERNAL MEDICINE

## 2024-11-04 PROCEDURE — 90662 IIV NO PRSV INCREASED AG IM: CPT | Performed by: INTERNAL MEDICINE

## 2024-11-04 PROCEDURE — 99207 PR FOOT EXAM NO CHARGE: CPT | Performed by: INTERNAL MEDICINE

## 2024-11-04 RX ORDER — EZETIMIBE 10 MG/1
10 TABLET ORAL DAILY
Qty: 100 TABLET | Refills: 3 | Status: SHIPPED | OUTPATIENT
Start: 2024-11-04

## 2024-11-04 RX ORDER — METOPROLOL SUCCINATE 50 MG/1
TABLET, EXTENDED RELEASE ORAL
Qty: 135 TABLET | Refills: 3 | Status: SHIPPED | OUTPATIENT
Start: 2024-11-04

## 2024-11-04 RX ORDER — FLUTICASONE PROPIONATE 50 MCG
1 SPRAY, SUSPENSION (ML) NASAL DAILY
Qty: 16 G | Refills: 0 | Status: SHIPPED | OUTPATIENT
Start: 2024-11-04

## 2024-11-04 RX ORDER — ROSUVASTATIN CALCIUM 40 MG/1
40 TABLET, COATED ORAL DAILY
Qty: 100 TABLET | Refills: 3 | Status: SHIPPED | OUTPATIENT
Start: 2024-11-04

## 2024-11-04 RX ORDER — MESALAMINE 800 MG/1
1 TABLET, DELAYED RELEASE ORAL 2 TIMES DAILY
Qty: 200 TABLET | Refills: 3 | Status: SHIPPED | OUTPATIENT
Start: 2024-11-04 | End: 2024-11-04

## 2024-11-04 RX ORDER — TAMSULOSIN HYDROCHLORIDE 0.4 MG/1
0.4 CAPSULE ORAL DAILY
Qty: 90 CAPSULE | Refills: 3 | Status: SHIPPED | OUTPATIENT
Start: 2024-11-04

## 2024-11-04 RX ORDER — WARFARIN SODIUM 5 MG/1
TABLET ORAL
Qty: 120 TABLET | Refills: 3 | Status: ON HOLD | OUTPATIENT
Start: 2024-11-04 | End: 2024-11-16

## 2024-11-04 SDOH — HEALTH STABILITY: PHYSICAL HEALTH
ON AVERAGE, HOW MANY DAYS PER WEEK DO YOU ENGAGE IN MODERATE TO STRENUOUS EXERCISE (LIKE A BRISK WALK)?: PATIENT DECLINED

## 2024-11-04 SDOH — HEALTH STABILITY: PHYSICAL HEALTH: ON AVERAGE, HOW MANY MINUTES DO YOU ENGAGE IN EXERCISE AT THIS LEVEL?: 20 MIN

## 2024-11-04 ASSESSMENT — SOCIAL DETERMINANTS OF HEALTH (SDOH): HOW OFTEN DO YOU GET TOGETHER WITH FRIENDS OR RELATIVES?: TWICE A WEEK

## 2024-11-04 NOTE — NURSING NOTE
Declined A1C and CMP today - informed that they were ordered for today, but he had an A1C 2 months ago. States that he will be going downstairs for INR however.    Estefania Farias MA on 11/4/2024 at 9:05 AM

## 2024-11-04 NOTE — PROGRESS NOTES
ANTICOAGULATION MANAGEMENT     Fausto Farr 84 year old male is on warfarin with subtherapeutic INR result. (Goal INR 2.5-3.5)    Recent labs: (last 7 days)     11/04/24  0911   INR 2.3*       ASSESSMENT     Source(s): Chart Review and Patient/Caregiver Call     Warfarin doses taken: Warfarin taken as instructed  Diet: No new diet changes identified  Medication/supplement changes: None noted  New illness, injury, or hospitalization: Yes: reports ongoing nasal congestion/occasional cough for the past 5 days.  Pulled hamstring he reported last week mostly resolved until he did a lot of yard work yesterday and now has some pain again. Has PT today.   Signs or symptoms of bleeding or clotting: No  Previous result: Supratherapeutic (dosing decreased 5.9% this visit)  Additional findings: Ralph has strict cardio note advising bridging if INR falls below 2.5. When discussing this today, stated he strongly does not want to do lovenox injections and would rather take a booster dose of warfarin this evening.   [facility] approval to retrial 42.5 mg. Had to move days of the week in order to ensure 7.5 mg given tonight. Went through daily dosing with Ralph and had him write down. Also discussed that he will likely need to change tablet strengths if unable to achieve therapeutic INR next check.        PLAN     Recommended plan for no diet, medication or health factor changes affecting INR     Dosing Instructions: Increase your warfarin dose (6.2% change) with next INR in 2 weeks       Summary  As of 11/4/2024      Full warfarin instructions:  7.5 mg every Mon, Wed, Fri; 5 mg all other days   Next INR check:  11/18/2024               Telephone call with Ralph who verbalizes understanding and agrees to plan    Lab visit scheduled    Education provided: Please call back if any changes to your diet, medications or how you've been taking warfarin    Plan made with [facility] Pharmacist Irma Rios,  RN  11/4/2024  Anticoagulation Clinic  [facility] for routing messages: joel DOWELL  [facility] patient phone line: [phone]        _______________________________________________________________________     Anticoagulation Episode Summary       Current INR goal:  2.5-3.5   TTR:  41.8% (1 y)   Target end date:  Indefinite   Send INR reminders to:  SHANNA DOWELL    Indications    S/P mitral valve replacement [Z95.2]  Long term current use of anticoagulant therapy [Z79.01]  Longstanding persistent atrial fibrillation (H) [I48.11]             Comments:  Per 9/20/22 Cardio Note strict INR goal of 2.5-3.5. If INR falls below 2.5 at any time, needs to be bridged with Lovenox. consent to leave DVM              Anticoagulation Care Providers       Provider Role Specialty Phone number    Jodi Flower Mai, MD Referring Internal Medicine - Pediatrics [phone]

## 2024-11-04 NOTE — PROGRESS NOTES
Preventive Care Visit  [facility] DELMER Flower MD, Internal Medicine - Pediatrics  Nov 4, 2024      Assessment & Plan     Encounter for Medicare annual wellness exam  Getting PT for pulled hamstring.    Type 2 diabetes mellitus without complication, without long-term current use of insulin (H)  Would benefit from jardiance - cost has been an issue in the past.  He will check with his insurance about this again.  - FOOT EXAM  - Hemoglobin A1c; Future    NSVT (nonsustained ventricular tachycardia)  - Stable, no side effects with medications, refilled meds today  - metoprolol succinate ER (TOPROL XL) 50 MG 24 hr tablet; Take 1 1/2 tab (75mg) daily    Coronary artery disease without angina pectoris, unspecified vessel or lesion type, unspecified whether native or transplanted heart  - Stable, no side effects with medications, refilled meds today  - metoprolol succinate ER (TOPROL XL) 50 MG 24 hr tablet; Take 1 1/2 tab (75mg) daily  - Comprehensive metabolic panel (BMP + Alb, Alk Phos, ALT, AST, Total. Bili, TP); Future    Chronic diastolic congestive heart failure (H)  - Stable, no side effects with medications, refilled meds today  - metoprolol succinate ER (TOPROL XL) 50 MG 24 hr tablet; Take 1 1/2 tab (75mg) daily    S/P mitral valve replacement  - Stable, no side effects with medications, refilled meds today  - warfarin ANTICOAGULANT (COUMADIN) 5 MG tablet; Take 1 tablet (5mg) every Mon,Wed & Fri / Take 1 1/2 tablets (7.5mg) all other days OR per INR clinic    Longstanding persistent atrial fibrillation (H)      Mixed hyperlipidemia  - Stable, no side effects with medications, refilled meds today  - ezetimibe (ZETIA) 10 MG tablet; Take 1 tablet (10 mg) by mouth daily.  - rosuvastatin (CRESTOR) 40 MG tablet; Take 1 tablet (40 mg) by mouth daily.    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  - mesalamine (ASACOL HD) 800 MG EC tablet; Take 1 tablet (800 mg) by mouth 2 times daily.    Urinary retention  - Stable, no side effects with medications, refilled meds today  - tamsulosin (FLOMAX) 0.4 MG capsule; Take 1 capsule (0.4 mg) by mouth daily.    Malignant neoplasm of prostate (H)  Followed by urology    Abdominal aortic aneurysm (AAA) without rupture, unspecified part (H)  Followed by vascular surgery.    Morbid obesity (H)  Weight has been stable.  Still physically active and diabetes remains diet controlled.  Monitor.    History of septic arthritis  Per ID - on long term suppressive abx with no end date.  - amoxicillin-clavulanate (AUGMENTIN) 875-125 MG tablet; Take 1 tablet by mouth daily.  Dispense: 90 tablet; Refill: 3              BMI  Estimated body mass index is 33.85 kg/m  as calculated from the following:    Height as of this encounter: 1.651 m (5' 5\").    Weight as of this encounter: 92.3 kg (203 lb 6.4 oz).   Weight management plan: Discussed healthy diet and exercise guidelines    Counseling  Appropriate preventive services were addressed with this patient via screening, questionnaire, or discussion as appropriate for fall prevention, nutrition, physical activity, Tobacco-use cessation, social engagement, weight loss and cognition.  Checklist reviewing preventive services available has been given to the patient.  Reviewed patient's diet, addressing concerns and/or questions.       See Patient Instructions    Eric Rosas is a 84 year old, presenting for " the following:  Physical        11/4/2024     7:50 AM   Additional Questions   Roomed by Estefania Farias CMA           HPI          Health Care Directive  Patient has a Health Care Directive on file  Advance care planning document is on file and is current.      11/4/2024   General Health   How would you rate your overall physical health? (!) FAIR   Feel stress (tense, anxious, or unable to sleep) Not at all            11/4/2024   Nutrition   Diet: Regular (no restrictions)            11/4/2024   Exercise   Days per week of moderate/strenous exercise Patient declined   Average minutes spent exercising at this level 20 min            11/4/2024   Social Factors   Frequency of gathering with friends or relatives Twice a week   Worry food won't last until get money to buy more No   Food not last or not have enough money for food? No   Do you have housing? (Housing is defined as stable permanent housing and does not include staying ouside in a car, in a tent, in an abandoned building, in an overnight shelter, or couch-surfing.) Yes   Are you worried about losing your housing? No   Lack of transportation? No   Unable to get utilities (heat,electricity)? No            11/4/2024   Fall Risk   Fallen 2 or more times in the past year? No    Trouble with walking or balance? No        Patient-reported          11/4/2024   Activities of Daily Living- Home Safety   Needs help with the following daily activites None of the above   Safety concerns in the home None of the above            11/4/2024   Dental   Dentist two times every year? Yes            11/4/2024   Hearing Screening   Hearing concerns? None of the above            11/4/2024   Driving Risk Screening   Patient/family members have concerns about driving No            11/4/2024   General Alertness/Fatigue Screening   Have you been more tired than usual lately? No            11/4/2024   Urinary Incontinence Screening   Bothered by leaking urine in past 6 months No             2024   TB Screening   Were you born outside of the [place]? No            Today's PHQ-2 Score:       2024     7:52 AM   PHQ-2 (  [employer])   Q1: Little interest or pleasure in doing things 0    Q2: Feeling down, depressed or hopeless 0    PHQ-2 Score 0    Q1: Little interest or pleasure in doing things Not at all   Q2: Feeling down, depressed or hopeless Not at all   PHQ-2 Score 0       Patient-reported           2024   Substance Use   Alcohol more than 3/day or more than 7/wk No   Do you have a current opioid prescription? No   How severe/bad is pain from 1 to 10? 4/10   Do you use any other substances recreationally? No        Social History     Tobacco Use    Smoking status: Former     Current packs/day: 0.00     Average packs/day: 1 pack/day for 40.5 years (40.5 ttl pk-yrs)     Types: Cigarettes     Start date: 4/15/1962     Quit date: 10/23/2002     Years since quittin.0    Smokeless tobacco: Never   Vaping Use    Vaping status: Never Used   Substance Use Topics    Alcohol use: Yes     Comment: a couple beers per week (socially)    Drug use: No                 Reviewed and updated as needed this visit by Provider                      Current providers sharing in care for this patient include:  Patient Care Team:  Jodi Flower Mai, MD as PCP - General (Internal Medicine)  Calderon Santo MD as MD (Cardiology)  Walt Garcia MD as MD (Orthopedics)  Jodi Flower Mai, MD as Assigned PCP  Isma English MD as MD (Neurology)  Ruthann Crowell [facility] as Pharmacist (Pharmacist)  Agata Santos PA-C as Assigned Sleep Provider  Arjun Malik MD as Assigned Surgical Provider  Vini Wu MD as Assigned Heart and Vascular Provider  Bryant Stauffer MD as MD (Cardiovascular Disease)    The following health maintenance items are reviewed in Epic and correct as of today:  Health Maintenance   Topic Date Due    HF ACTION PLAN  2019    INFLUENZA VACCINE (1) 2024     "COVID-19 Vaccine (6 - 2024-25 season) 09/01/2024    DIABETIC FOOT EXAM  10/24/2024    ANNUAL REVIEW OF HM ORDERS  10/24/2024    MEDICARE ANNUAL WELLNESS VISIT  10/24/2024    A1C  12/30/2024    EYE EXAM  01/09/2025    BMP  01/25/2025    MICROALBUMIN  02/16/2025    CBC  02/16/2025    ALT  07/25/2025    LIPID  07/25/2025    PSA  07/25/2025    FALL RISK ASSESSMENT  11/04/2025    DTAP/TDAP/TD IMMUNIZATION (3 - Td or Tdap) 09/07/2027    ADVANCE CARE PLANNING  10/24/2028    TSH W/FREE T4 REFLEX  Completed    PHQ-2 (once per calendar year)  Completed    Pneumococcal Vaccine: 65+ Years  Completed    ZOSTER IMMUNIZATION  Completed    RSV VACCINE  Completed    HPV IMMUNIZATION  Aged Out    MENINGITIS IMMUNIZATION  Aged Out    RSV MONOCLONAL ANTIBODY  Aged Out    URINE DRUG SCREEN  Discontinued       All other systems on a 10-point review are negative, unless otherwise noted in HPI       Objective    Exam  /70 (BP Location: Right arm, Patient Position: Sitting, Cuff Size: Adult Large)   Pulse 67   Temp (!) 96.6  F (35.9  C) (Temporal)   Resp 14   Ht 1.651 m (5' 5\")   Wt 92.3 kg (203 lb 6.4 oz)   SpO2 97%   BMI 33.85 kg/m     Estimated body mass index is 33.85 kg/m  as calculated from the following:    Height as of this encounter: 1.651 m (5' 5\").    Weight as of this encounter: 92.3 kg (203 lb 6.4 oz).    Physical Exam  GENERAL: alert and no distress  EYES: Eyes grossly normal to inspection, PERRL and conjunctivae and sclerae normal  HENT: ear canals and TM's normal, nose and mouth without ulcers or lesions  NECK: no adenopathy, no asymmetry, masses, or scars  RESP: lungs clear to auscultation - no rales, rhonchi or wheezes  CV: regular rate and rhythm, normal S1 S2, no S3 or S4, no murmur, click or rub, no peripheral edema  ABDOMEN: soft, nontender, no hepatosplenomegaly, no masses and bowel sounds normal  MS: no gross musculoskeletal defects noted, no edema  SKIN: no suspicious lesions or rashes  NEURO: Normal " strength and tone, mentation intact and speech normal  PSYCH: mentation appears normal, affect normal/bright  DIABETIC FOOT EXAM: normal DP and PT pulses, no trophic changes or ulcerative lesions, normal sensory exam and normal monofilament exam, except for findings noted on diabetic foot graphical image.                     No data to display                       Signed Electronically by: Chris Flower MD

## 2024-11-04 NOTE — PATIENT INSTRUCTIONS
Jardiance    Patient Education   Preventive Care Advice   This is general advice given by our system to help you stay healthy. However, your care team may have specific advice just for you. Please talk to your care team about your preventive care needs.  Nutrition  Eat 5 or more servings of fruits and vegetables each day.  Try wheat bread, brown rice and whole grain pasta (instead of white bread, rice, and pasta).  Get enough calcium and vitamin D. Check the label on foods and aim for 100% of the RDA (recommended daily allowance).  Lifestyle  Exercise at least 150 minutes each week  (30 minutes a day, 5 days a week).  Do muscle strengthening activities 2 days a week. These help control your weight and prevent disease.  No smoking.  Wear sunscreen to prevent skin cancer.  Have a dental exam and cleaning every 6 months.  Yearly exams  See your health care team every year to talk about:  Any changes in your health.  Any medicines your care team has prescribed.  Preventive care, family planning, and ways to prevent chronic diseases.  Shots (vaccines)   HPV shots (up to age 26), if you've never had them before.  Hepatitis B shots (up to age 59), if you've never had them before.  COVID-19 shot: Get this shot when it's due.  Flu shot: Get a flu shot every year.  Tetanus shot: Get a tetanus shot every 10 years.  Pneumococcal, hepatitis A, and RSV shots: Ask your care team if you need these based on your risk.  Shingles shot (for age 50 and up)  General health tests  Diabetes screening:  Starting at age 35, Get screened for diabetes at least every 3 years.  If you are younger than age 35, ask your care team if you should be screened for diabetes.  Cholesterol test: At age 39, start having a cholesterol test every 5 years, or more often if advised.  Bone density scan (DEXA): At age 50, ask your care team if you should have this scan for osteoporosis (brittle bones).  Hepatitis C: Get tested at least once in your life.  STIs  (sexually transmitted infections)  Before age 24: Ask your care team if you should be screened for STIs.  After age 24: Get screened for STIs if you're at risk. You are at risk for STIs (including HIV) if:  You are sexually active with more than one person.  You don't use condoms every time.  You or a partner was diagnosed with a sexually transmitted infection.  If you are at risk for HIV, ask about PrEP medicine to prevent HIV.  Get tested for HIV at least once in your life, whether you are at risk for HIV or not.  Cancer screening tests  Cervical cancer screening: If you have a cervix, begin getting regular cervical cancer screening tests starting at age 21.  Breast cancer scan (mammogram): If you've ever had breasts, begin having regular mammograms starting at age 40. This is a scan to check for breast cancer.  Colon cancer screening: It is important to start screening for colon cancer at age 45.  Have a colonoscopy test every 10 years (or more often if you're at risk) Or, ask your provider about stool tests like a FIT test every year or Cologuard test every 3 years.  To learn more about your testing options, visit:   .  For help making a decision, visit:   https://bit.ly/cq64907.  Prostate cancer screening test: If you have a prostate, ask your care team if a prostate cancer screening test (PSA) at age 55 is right for you.  Lung cancer screening: If you are a current or former smoker ages 50 to 80, ask your care team if ongoing lung cancer screenings are right for you.  For informational purposes only. Not to replace the advice of your health care provider. Copyright   2023 [employer]. All rights reserved. Clinically reviewed by the [facility] Transitions Program. [employer] [id number] - REV 01/24.

## 2024-11-06 ENCOUNTER — TELEPHONE (OUTPATIENT)
Dept: CARDIOLOGY | Facility: CLINIC | Age: 84
End: 2024-11-06

## 2024-11-06 ENCOUNTER — ANCILLARY PROCEDURE (OUTPATIENT)
Dept: CARDIOLOGY | Facility: CLINIC | Age: 84
End: 2024-11-06
Attending: INTERNAL MEDICINE
Payer: MEDICARE

## 2024-11-06 ENCOUNTER — HOSPITAL ENCOUNTER (OUTPATIENT)
Dept: CARDIOLOGY | Facility: CLINIC | Age: 84
Discharge: HOME | End: 2024-11-06
Attending: INTERNAL MEDICINE | Admitting: INTERNAL MEDICINE
Payer: MEDICARE

## 2024-11-06 DIAGNOSIS — Z95.0 CARDIAC PACEMAKER IN SITU: ICD-10-CM

## 2024-11-06 DIAGNOSIS — I48.91 ATRIAL FIBRILLATION STATUS POST CARDIOVERSION (H): ICD-10-CM

## 2024-11-06 DIAGNOSIS — I44.2 COMPLETE ATRIOVENTRICULAR BLOCK (H): Primary | ICD-10-CM

## 2024-11-06 DIAGNOSIS — I35.9 AORTIC VALVE DISORDER: ICD-10-CM

## 2024-11-06 LAB — LVEF ECHO: NORMAL

## 2024-11-06 PROCEDURE — 93280 PM DEVICE PROGR EVAL DUAL: CPT | Performed by: INTERNAL MEDICINE

## 2024-11-06 PROCEDURE — 255N000002 HC RX 255 OP 636: Performed by: INTERNAL MEDICINE

## 2024-11-06 PROCEDURE — 999N000208 ECHOCARDIOGRAM COMPLETE

## 2024-11-06 PROCEDURE — 93306 TTE W/DOPPLER COMPLETE: CPT | Mod: 26 | Performed by: INTERNAL MEDICINE

## 2024-11-06 RX ADMIN — HUMAN ALBUMIN MICROSPHERES AND PERFLUTREN 6 ML: 10; .22 INJECTION, SOLUTION INTRAVENOUS at 11:08

## 2024-11-07 NOTE — TELEPHONE ENCOUNTER
Caledron Santo MD Christianson, Eva Bromen, RN  Cc: [facility] Heart Team 6  Caller: Unspecified (Yesterday,  9:53 AM)  Recommend office visit in next 1-2 months  I assume he is on blood thinner    Patient has upcoming OV 12/19/24 with Dr. Santo.  Denice Duff, RN on 11/7/2024 at 11:11 AM

## 2024-11-12 ENCOUNTER — TRANSFERRED RECORDS (OUTPATIENT)
Dept: HEALTH INFORMATION MANAGEMENT | Facility: CLINIC | Age: 84
End: 2024-11-12

## 2024-11-12 ENCOUNTER — HOSPITAL ENCOUNTER (INPATIENT)
Facility: CLINIC | Age: 84
LOS: 2 days | Discharge: HOME | DRG: 322 | End: 2024-11-16
Attending: EMERGENCY MEDICINE | Admitting: INTERNAL MEDICINE
Payer: MEDICARE

## 2024-11-12 DIAGNOSIS — I48.11 LONGSTANDING PERSISTENT ATRIAL FIBRILLATION (H): ICD-10-CM

## 2024-11-12 DIAGNOSIS — I21.4 NSTEMI (NON-ST ELEVATED MYOCARDIAL INFARCTION) (H): ICD-10-CM

## 2024-11-12 DIAGNOSIS — Z95.2 S/P AORTIC VALVE REPLACEMENT: ICD-10-CM

## 2024-11-12 DIAGNOSIS — R07.9 CHEST PAIN, UNSPECIFIED TYPE: ICD-10-CM

## 2024-11-12 DIAGNOSIS — Z95.2 S/P MITRAL VALVE REPLACEMENT: ICD-10-CM

## 2024-11-12 DIAGNOSIS — J32.9 SINUSITIS, UNSPECIFIED CHRONICITY, UNSPECIFIED LOCATION: ICD-10-CM

## 2024-11-12 DIAGNOSIS — I50.22 CHRONIC SYSTOLIC CONGESTIVE HEART FAILURE (H): ICD-10-CM

## 2024-11-12 DIAGNOSIS — I48.19 PERSISTENT ATRIAL FIBRILLATION (H): ICD-10-CM

## 2024-11-12 DIAGNOSIS — R94.39 ABNORMAL STRESS TEST: Primary | ICD-10-CM

## 2024-11-12 LAB
ALBUMIN SERPL BCG-MCNC: 4 G/DL (ref 3.5–5.2)
ALP SERPL-CCNC: 60 U/L (ref 40–150)
ALT SERPL W P-5'-P-CCNC: 30 U/L (ref 0–70)
ANION GAP SERPL CALCULATED.3IONS-SCNC: 11 MMOL/L (ref 7–15)
AST SERPL W P-5'-P-CCNC: 36 U/L (ref 0–45)
BASOPHILS # BLD AUTO: 0 10E3/UL (ref 0–0.2)
BASOPHILS NFR BLD AUTO: 0 %
BILIRUB DIRECT SERPL-MCNC: <0.2 MG/DL (ref 0–0.3)
BILIRUB SERPL-MCNC: 0.3 MG/DL
BUN SERPL-MCNC: 16.3 MG/DL (ref 8–23)
CALCIUM SERPL-MCNC: 9.1 MG/DL (ref 8.8–10.4)
CHLORIDE SERPL-SCNC: 102 MMOL/L (ref 98–107)
CREAT SERPL-MCNC: 0.9 MG/DL (ref 0.67–1.17)
EGFRCR SERPLBLD CKD-EPI 2021: 85 ML/MIN/1.73M2
EOSINOPHIL # BLD AUTO: 0.1 10E3/UL (ref 0–0.7)
EOSINOPHIL NFR BLD AUTO: 1 %
ERYTHROCYTE [DISTWIDTH] IN BLOOD BY AUTOMATED COUNT: 13.1 % (ref 10–15)
GLUCOSE SERPL-MCNC: 135 MG/DL (ref 70–99)
HCO3 SERPL-SCNC: 23 MMOL/L (ref 22–29)
HCT VFR BLD AUTO: 39.7 % (ref 40–53)
HGB BLD-MCNC: 13.3 G/DL (ref 13.3–17.7)
IMM GRANULOCYTES # BLD: 0 10E3/UL
IMM GRANULOCYTES NFR BLD: 0 %
LIPASE SERPL-CCNC: 31 U/L (ref 13–60)
LYMPHOCYTES # BLD AUTO: 0.6 10E3/UL (ref 0.8–5.3)
LYMPHOCYTES NFR BLD AUTO: 7 %
MCH RBC QN AUTO: 29.4 PG (ref 26.5–33)
MCHC RBC AUTO-ENTMCNC: 33.5 G/DL (ref 31.5–36.5)
MCV RBC AUTO: 88 FL (ref 78–100)
MONOCYTES # BLD AUTO: 0.6 10E3/UL (ref 0–1.3)
MONOCYTES NFR BLD AUTO: 6 %
NEUTROPHILS # BLD AUTO: 8.3 10E3/UL (ref 1.6–8.3)
NEUTROPHILS NFR BLD AUTO: 86 %
NRBC # BLD AUTO: 0 10E3/UL
NRBC BLD AUTO-RTO: 0 /100
PLATELET # BLD AUTO: 251 10E3/UL (ref 150–450)
POTASSIUM SERPL-SCNC: 4.2 MMOL/L (ref 3.4–5.3)
PROT SERPL-MCNC: 7.3 G/DL (ref 6.4–8.3)
RBC # BLD AUTO: 4.52 10E6/UL (ref 4.4–5.9)
SODIUM SERPL-SCNC: 136 MMOL/L (ref 135–145)
TROPONIN T SERPL HS-MCNC: 35 NG/L
TROPONIN T SERPL HS-MCNC: 38 NG/L
WBC # BLD AUTO: 9.6 10E3/UL (ref 4–11)

## 2024-11-12 PROCEDURE — 82310 ASSAY OF CALCIUM: CPT | Performed by: EMERGENCY MEDICINE

## 2024-11-12 PROCEDURE — 83690 ASSAY OF LIPASE: CPT | Performed by: EMERGENCY MEDICINE

## 2024-11-12 PROCEDURE — 85018 HEMOGLOBIN: CPT | Performed by: EMERGENCY MEDICINE

## 2024-11-12 PROCEDURE — 36415 COLL VENOUS BLD VENIPUNCTURE: CPT | Performed by: EMERGENCY MEDICINE

## 2024-11-12 PROCEDURE — 99223 1ST HOSP IP/OBS HIGH 75: CPT | Mod: AI | Performed by: INTERNAL MEDICINE

## 2024-11-12 PROCEDURE — G0378 HOSPITAL OBSERVATION PER HR: HCPCS

## 2024-11-12 PROCEDURE — 250N000013 HC RX MED GY IP 250 OP 250 PS 637: Performed by: EMERGENCY MEDICINE

## 2024-11-12 PROCEDURE — 80048 BASIC METABOLIC PNL TOTAL CA: CPT | Performed by: EMERGENCY MEDICINE

## 2024-11-12 PROCEDURE — 82248 BILIRUBIN DIRECT: CPT | Performed by: EMERGENCY MEDICINE

## 2024-11-12 PROCEDURE — 85025 COMPLETE CBC W/AUTO DIFF WBC: CPT | Performed by: EMERGENCY MEDICINE

## 2024-11-12 PROCEDURE — 99285 EMERGENCY DEPT VISIT HI MDM: CPT | Mod: 25

## 2024-11-12 PROCEDURE — 84484 ASSAY OF TROPONIN QUANT: CPT | Performed by: EMERGENCY MEDICINE

## 2024-11-12 RX ORDER — MESALAMINE 800 MG/1
800 TABLET, DELAYED RELEASE ORAL 2 TIMES DAILY
COMMUNITY

## 2024-11-12 RX ORDER — ACETAMINOPHEN 325 MG/1
650 TABLET ORAL 2 TIMES DAILY
COMMUNITY

## 2024-11-12 RX ORDER — ASPIRIN 81 MG/1
324 TABLET, CHEWABLE ORAL ONCE
Status: COMPLETED | OUTPATIENT
Start: 2024-11-12 | End: 2024-11-12

## 2024-11-12 RX ADMIN — ASPIRIN 81 MG CHEWABLE TABLET 324 MG: 81 TABLET CHEWABLE at 21:47

## 2024-11-12 ASSESSMENT — ACTIVITIES OF DAILY LIVING (ADL)
ADLS_ACUITY_SCORE: 0

## 2024-11-12 NOTE — Clinical Note
The first balloon was inserted into the left anterior descending and middle left anterior descending.Max pressure = 12 dilaln. Total duration = 26 seconds.

## 2024-11-12 NOTE — Clinical Note
The first balloon was inserted into the left anterior descending and middle left anterior descending.Max pressure = 12 dillan. Total duration = 37 seconds.     Max pressure = 12 dillan. Total duration = 19 seconds.    Balloon reinflated a second time: Max pressure = 12 dillan. Total duration = 19 seconds.

## 2024-11-12 NOTE — Clinical Note
The first balloon was inserted into the left anterior descending and middle left anterior descending.Max pressure = 20 dillan. Total duration = 15 seconds.     Max pressure = 20 dillan. Total duration = 20 seconds.    Balloon reinflated a second time: Max pressure = 20 dillan. Total duration = 20 seconds.  Balloon reinflated a third time: Max pressure = 20 dillan. Total duration = 24 seconds.

## 2024-11-12 NOTE — Clinical Note
The first balloon was inserted into the left anterior descending and middle left anterior descending.Max pressure = 12 dillan. Total duration = 16 seconds.     Max pressure = 8 dillan. Total duration = 22 seconds.    Balloon reinflated a second time: Max pressure = 8 dillan. Total duration = 22 seconds.  Balloon reinflated a third time: Max pressure = 8 dillan. Total duration = 12 seconds.  Balloon reinflated a fourth time: Max pressure = 16 dillan. T otal duration = 24 seconds.

## 2024-11-12 NOTE — Clinical Note
Stent deployed in the middle left anterior descending. Max pressure = 12 dillan. Total duration = 21 seconds. Balloon reinflated a second time: Max pressure = 15 dillan. Total duration = 10 seconds.

## 2024-11-12 NOTE — Clinical Note
Stent deployed in the middle left anterior descending. Max pressure = 8 dillan. Total duration = 25 seconds. Balloon reinflated a second time: Max pressure = 10 dillan. Total duration = 18 seconds.

## 2024-11-13 ENCOUNTER — APPOINTMENT (OUTPATIENT)
Dept: NUCLEAR MEDICINE | Facility: CLINIC | Age: 84
DRG: 322 | End: 2024-11-13
Attending: INTERNAL MEDICINE
Payer: MEDICARE

## 2024-11-13 LAB
ADV 40+41 DNA STL QL NAA+NON-PROBE: NEGATIVE
ASTRO TYP 1-8 RNA STL QL NAA+NON-PROBE: NEGATIVE
ATRIAL RATE - MUSE: 92 BPM
C CAYETANENSIS DNA STL QL NAA+NON-PROBE: NEGATIVE
CAMPYLOBACTER DNA SPEC NAA+PROBE: NEGATIVE
CRYPTOSP DNA STL QL NAA+NON-PROBE: NEGATIVE
CV BLOOD PRESSURE: 43 MMHG
CV STRESS MAX HR HE: 71
DIASTOLIC BLOOD PRESSURE - MUSE: NORMAL MMHG
E COLI O157 DNA STL QL NAA+NON-PROBE: ABNORMAL
E HISTOLYT DNA STL QL NAA+NON-PROBE: NEGATIVE
EAEC ASTA GENE ISLT QL NAA+PROBE: NEGATIVE
EC STX1+STX2 GENES STL QL NAA+NON-PROBE: NEGATIVE
EPEC EAE GENE STL QL NAA+NON-PROBE: NEGATIVE
ERYTHROCYTE [DISTWIDTH] IN BLOOD BY AUTOMATED COUNT: 13.2 % (ref 10–15)
ETEC LTA+ST1A+ST1B TOX ST NAA+NON-PROBE: NEGATIVE
G LAMBLIA DNA STL QL NAA+NON-PROBE: NEGATIVE
HCT VFR BLD AUTO: 38.9 % (ref 40–53)
HGB BLD-MCNC: 12.9 G/DL (ref 13.3–17.7)
INR PPP: 2.33 (ref 0.85–1.15)
INTERPRETATION ECG - MUSE: NORMAL
MCH RBC QN AUTO: 29.3 PG (ref 26.5–33)
MCHC RBC AUTO-ENTMCNC: 33.2 G/DL (ref 31.5–36.5)
MCV RBC AUTO: 88 FL (ref 78–100)
NOROVIRUS GI+II RNA STL QL NAA+NON-PROBE: POSITIVE
NUC STRESS EJECTION FRACTION: 44 %
P AXIS - MUSE: NORMAL DEGREES
P SHIGELLOIDES DNA STL QL NAA+NON-PROBE: NEGATIVE
PLATELET # BLD AUTO: 233 10E3/UL (ref 150–450)
PR INTERVAL - MUSE: NORMAL MS
QRS DURATION - MUSE: 158 MS
QT - MUSE: 428 MS
QTC - MUSE: 475 MS
R AXIS - MUSE: -56 DEGREES
RATE PRESSURE PRODUCT: 8023
RBC # BLD AUTO: 4.4 10E6/UL (ref 4.4–5.9)
RVA RNA STL QL NAA+NON-PROBE: NEGATIVE
SALMONELLA SP RPOD STL QL NAA+PROBE: NEGATIVE
SAPO I+II+IV+V RNA STL QL NAA+NON-PROBE: NEGATIVE
SHIGELLA SP+EIEC IPAH ST NAA+NON-PROBE: NEGATIVE
STRESS ECHO BASELINE DIASTOLIC HE: 68
STRESS ECHO BASELINE HR: 72 BPM
STRESS ECHO BASELINE SYSTOLIC BP: 109
STRESS ECHO CALCULATED PERCENT HR: 52 %
STRESS ECHO LAST STRESS DIASTOLIC BP: 59
STRESS ECHO LAST STRESS SYSTOLIC BP: 113
STRESS ECHO TARGET HR: 137
SYSTOLIC BLOOD PRESSURE - MUSE: NORMAL MMHG
T AXIS - MUSE: 28 DEGREES
UFH PPP CHRO-ACNC: 0.23 IU/ML
V CHOLERAE DNA SPEC QL NAA+PROBE: NEGATIVE
VENTRICULAR RATE- MUSE: 74 BPM
VIBRIO DNA SPEC NAA+PROBE: NEGATIVE
WBC # BLD AUTO: 5.8 10E3/UL (ref 4–11)
Y ENTEROCOL DNA STL QL NAA+PROBE: NEGATIVE

## 2024-11-13 PROCEDURE — A9500 TC99M SESTAMIBI: HCPCS | Performed by: INTERNAL MEDICINE

## 2024-11-13 PROCEDURE — G1010 CDSM STANSON: HCPCS

## 2024-11-13 PROCEDURE — 999N000154 HC STATISTIC RADIOLOGY XRAY, US, CT, MAR, NM

## 2024-11-13 PROCEDURE — G0378 HOSPITAL OBSERVATION PER HR: HCPCS

## 2024-11-13 PROCEDURE — 78452 HT MUSCLE IMAGE SPECT MULT: CPT | Mod: 26 | Performed by: INTERNAL MEDICINE

## 2024-11-13 PROCEDURE — 85520 HEPARIN ASSAY: CPT

## 2024-11-13 PROCEDURE — 93017 CV STRESS TEST TRACING ONLY: CPT

## 2024-11-13 PROCEDURE — 99232 SBSQ HOSP IP/OBS MODERATE 35: CPT | Performed by: PHYSICIAN ASSISTANT

## 2024-11-13 PROCEDURE — 78452 HT MUSCLE IMAGE SPECT MULT: CPT | Mod: MG

## 2024-11-13 PROCEDURE — 93016 CV STRESS TEST SUPVJ ONLY: CPT | Performed by: INTERNAL MEDICINE

## 2024-11-13 PROCEDURE — 36415 COLL VENOUS BLD VENIPUNCTURE: CPT | Performed by: PHYSICIAN ASSISTANT

## 2024-11-13 PROCEDURE — 93018 CV STRESS TEST I&R ONLY: CPT | Performed by: INTERNAL MEDICINE

## 2024-11-13 PROCEDURE — 343N000001 HC RX 343 MED OP 636: Performed by: INTERNAL MEDICINE

## 2024-11-13 PROCEDURE — 85014 HEMATOCRIT: CPT

## 2024-11-13 PROCEDURE — G1010 CDSM STANSON: HCPCS | Performed by: INTERNAL MEDICINE

## 2024-11-13 PROCEDURE — 250N000011 HC RX IP 250 OP 636: Performed by: INTERNAL MEDICINE

## 2024-11-13 PROCEDURE — 999N000049 HC STATISTIC ECHO STRESS OR NM NPI

## 2024-11-13 PROCEDURE — 87081 CULTURE SCREEN ONLY: CPT | Performed by: INTERNAL MEDICINE

## 2024-11-13 PROCEDURE — 250N000011 HC RX IP 250 OP 636

## 2024-11-13 PROCEDURE — 99223 1ST HOSP IP/OBS HIGH 75: CPT | Mod: 25

## 2024-11-13 PROCEDURE — 87507 IADNA-DNA/RNA PROBE TQ 12-25: CPT | Performed by: PHYSICIAN ASSISTANT

## 2024-11-13 PROCEDURE — 250N000013 HC RX MED GY IP 250 OP 250 PS 637: Performed by: INTERNAL MEDICINE

## 2024-11-13 PROCEDURE — 36415 COLL VENOUS BLD VENIPUNCTURE: CPT

## 2024-11-13 PROCEDURE — 85610 PROTHROMBIN TIME: CPT | Performed by: PHYSICIAN ASSISTANT

## 2024-11-13 RX ORDER — POTASSIUM CHLORIDE 1500 MG/1
20 TABLET, EXTENDED RELEASE ORAL
Status: CANCELLED | OUTPATIENT
Start: 2024-11-13

## 2024-11-13 RX ORDER — NITROGLYCERIN 0.4 MG/1
0.4 TABLET SUBLINGUAL EVERY 5 MIN PRN
Status: DISCONTINUED | OUTPATIENT
Start: 2024-11-13 | End: 2024-11-13

## 2024-11-13 RX ORDER — CAFFEINE 200 MG
200 TABLET ORAL
Status: DISCONTINUED | OUTPATIENT
Start: 2024-11-13 | End: 2024-11-13

## 2024-11-13 RX ORDER — HEPARIN SODIUM 10000 [USP'U]/100ML
0-5000 INJECTION, SOLUTION INTRAVENOUS CONTINUOUS
Status: DISCONTINUED | OUTPATIENT
Start: 2024-11-13 | End: 2024-11-16

## 2024-11-13 RX ORDER — LIDOCAINE 40 MG/G
CREAM TOPICAL
Status: CANCELLED | OUTPATIENT
Start: 2024-11-13

## 2024-11-13 RX ORDER — ACETAMINOPHEN 325 MG/1
650 TABLET ORAL EVERY 4 HOURS PRN
Status: DISCONTINUED | OUTPATIENT
Start: 2024-11-13 | End: 2024-11-16 | Stop reason: HOSPADM

## 2024-11-13 RX ORDER — AMINOPHYLLINE 25 MG/ML
50-100 INJECTION, SOLUTION INTRAVENOUS
Status: DISCONTINUED | OUTPATIENT
Start: 2024-11-13 | End: 2024-11-13

## 2024-11-13 RX ORDER — DIAZEPAM 5 MG/1
5 TABLET ORAL EVERY 30 MIN PRN
Status: DISCONTINUED | OUTPATIENT
Start: 2024-11-13 | End: 2024-11-13

## 2024-11-13 RX ORDER — TAMSULOSIN HYDROCHLORIDE 0.4 MG/1
0.4 CAPSULE ORAL EVERY EVENING
Status: DISCONTINUED | OUTPATIENT
Start: 2024-11-13 | End: 2024-11-16 | Stop reason: HOSPADM

## 2024-11-13 RX ORDER — REGADENOSON 0.08 MG/ML
0.4 INJECTION, SOLUTION INTRAVENOUS ONCE
Status: COMPLETED | OUTPATIENT
Start: 2024-11-13 | End: 2024-11-13

## 2024-11-13 RX ORDER — ACETAMINOPHEN 650 MG/1
650 SUPPOSITORY RECTAL EVERY 4 HOURS PRN
Status: DISCONTINUED | OUTPATIENT
Start: 2024-11-13 | End: 2024-11-16 | Stop reason: HOSPADM

## 2024-11-13 RX ORDER — LORAZEPAM 0.5 MG/1
0.5 TABLET ORAL
Status: CANCELLED | OUTPATIENT
Start: 2024-11-13

## 2024-11-13 RX ORDER — MESALAMINE 800 MG/1
800 TABLET, DELAYED RELEASE ORAL 2 TIMES DAILY
Status: DISCONTINUED | OUTPATIENT
Start: 2024-11-13 | End: 2024-11-16 | Stop reason: HOSPADM

## 2024-11-13 RX ORDER — SODIUM CHLORIDE 9 MG/ML
INJECTION, SOLUTION INTRAVENOUS CONTINUOUS
Status: CANCELLED | OUTPATIENT
Start: 2024-11-13

## 2024-11-13 RX ORDER — ASPIRIN 81 MG/1
81 TABLET ORAL DAILY
Status: DISCONTINUED | OUTPATIENT
Start: 2024-11-13 | End: 2024-11-14

## 2024-11-13 RX ORDER — CAFFEINE CITRATE 20 MG/ML
60 SOLUTION INTRAVENOUS
Status: DISCONTINUED | OUTPATIENT
Start: 2024-11-13 | End: 2024-11-13

## 2024-11-13 RX ORDER — MAGNESIUM HYDROXIDE/ALUMINUM HYDROXICE/SIMETHICONE 120; 1200; 1200 MG/30ML; MG/30ML; MG/30ML
30 SUSPENSION ORAL EVERY 4 HOURS PRN
Status: DISCONTINUED | OUTPATIENT
Start: 2024-11-13 | End: 2024-11-16 | Stop reason: HOSPADM

## 2024-11-13 RX ORDER — ALBUTEROL SULFATE 90 UG/1
2 INHALANT RESPIRATORY (INHALATION) EVERY 5 MIN PRN
Status: DISCONTINUED | OUTPATIENT
Start: 2024-11-13 | End: 2024-11-13

## 2024-11-13 RX ORDER — NITROGLYCERIN 0.4 MG/1
0.4 TABLET SUBLINGUAL EVERY 5 MIN PRN
Status: DISCONTINUED | OUTPATIENT
Start: 2024-11-13 | End: 2024-11-16 | Stop reason: HOSPADM

## 2024-11-13 RX ORDER — SODIUM CHLORIDE 9 MG/ML
INJECTION, SOLUTION INTRAVENOUS CONTINUOUS
Status: ACTIVE | OUTPATIENT
Start: 2024-11-13 | End: 2024-11-13

## 2024-11-13 RX ORDER — LORAZEPAM 2 MG/ML
0.5 INJECTION INTRAMUSCULAR
Status: CANCELLED | OUTPATIENT
Start: 2024-11-13

## 2024-11-13 RX ADMIN — REGADENOSON 0.4 MG: 0.08 INJECTION, SOLUTION INTRAVENOUS at 10:04

## 2024-11-13 RX ADMIN — TAMSULOSIN HYDROCHLORIDE 0.4 MG: 0.4 CAPSULE ORAL at 20:36

## 2024-11-13 RX ADMIN — METOPROLOL SUCCINATE 75 MG: 50 TABLET, EXTENDED RELEASE ORAL at 11:53

## 2024-11-13 RX ADMIN — TAMSULOSIN HYDROCHLORIDE 0.4 MG: 0.4 CAPSULE ORAL at 00:51

## 2024-11-13 RX ADMIN — ACETAMINOPHEN 650 MG: 325 TABLET, FILM COATED ORAL at 10:52

## 2024-11-13 RX ADMIN — Medication 30.5 MILLICURIE: at 10:05

## 2024-11-13 RX ADMIN — MESALAMINE 800 MG: 800 TABLET, DELAYED RELEASE ORAL at 00:51

## 2024-11-13 RX ADMIN — MESALAMINE 800 MG: 800 TABLET, DELAYED RELEASE ORAL at 11:53

## 2024-11-13 RX ADMIN — MESALAMINE 800 MG: 800 TABLET, DELAYED RELEASE ORAL at 20:36

## 2024-11-13 RX ADMIN — HEPARIN SODIUM 1100 UNITS/HR: 10000 INJECTION, SOLUTION INTRAVENOUS at 17:20

## 2024-11-13 RX ADMIN — Medication 11.6 MILLICURIE: at 08:05

## 2024-11-13 RX ADMIN — AMOXICILLIN AND CLAVULANATE POTASSIUM 1 TABLET: 875; 125 TABLET, FILM COATED ORAL at 11:52

## 2024-11-13 RX ADMIN — ASPIRIN 81 MG: 81 TABLET, COATED ORAL at 11:53

## 2024-11-13 NOTE — H&P
[facility]    History and Physical - Hospitalist Service       Date of Admission:  11/12/2024    Assessment & Plan   Fausto Farr is a 84 year-old male with past medical history significant for coronary artery disease with prior CABG who presents with chest pain. Admitted on 11/12/2024.     Chest pain   Coronary artery disease with prior 2v CABG  *Presents with central chest ache, non-exertional, followed by n/v/d (see below).   *EKG V-paced rhythm with frequent PVCs. Troponin 38->35.  *Last stress test (NM Lexiscan) 4/2019 negative for ischemia.   *Given associated n/v/d, suspicion for GI etiology of symptoms.    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   - Hold warfarin pending stress testing in event it is positive and will need angiogram    S/p endovascular AAA repair with persistent endoleak  Slightly increased on CTA  "compared to 8/2024. Follows with interventional radiology with serial monitoring. Unlikely contributing to acute symptoms.   - Continue     Obstructive sleep apnea  - Continue CPAP per home settings    Hyperlipidemia  - Hold prior to admission ezetimibe, rosuvastatin while observation status     Ulcerative colitis  Reports symptoms recently stable prior to diarrhea above.   - Continue prior to admission mesalamine     BPH  - Continue prior to admission tamsulosin    Hx of septic arthritis  - Continue prior to admission amoxicillin-clavulanate PO       Diet: Regular diet   DVT Prophylaxis: Warfarin  Lord Catheter: Not present  Code Status: Full code     Disposition Plan   Addison to observation status. Anticipate discharge within 24 hours if clinically improved.     Entered: Tevin Burns MD 11/12/24     The patient's care was discussed with the ED provider, patient     Medically Ready for Discharge: Anticipated Tomorrow      Clinically Significant Risk Factors Present on Admission                # Drug Induced Coagulation Defect: home medication list includes an anticoagulant medication    # Hypertension: Noted on problem list  # Heart failure, NOS: heart failure noted on the problem list and last echo with EF 40-50%         # DMII: A1C = 6.9 % (Ref range: 0.0 - 5.6 %) within past 6 months    # Obesity: Estimated body mass index is 33.85 kg/m  as calculated from the following:    Height as of 11/4/24: 1.651 m (5' 5\").    Weight as of 11/4/24: 92.3 kg (203 lb 6.4 oz).        # Pacemaker present  # History of CABG: noted on surgical history            Tevin Burns MD  [facility]    ______________________________________________________________________    Chief Complaint   Chest pain with nausea, vomiting diarrhea    History of Present Illness   Fausto Farr is a 84 year old male who presents with the above chief complaint.    History is obtained from the patient, discussion with " ED provider and review of medical record. The patient reports he awoke at around 5 AM with lower abdominal discomfort. He attempted to sit on the toilet to have a bowel movement, but was unsuccessful. He went back to bed and when he awoke a few hours later, he noted a central ache in his chest.  He walked downstairs for breakfast, denies any increase in his chest pain with activity.  Before he was able to eat anything, he became nauseated and went to the bathroom and had a small amount of formed stool followed by 4 episodes of diarrhea and 3-4 episodes of vomiting.  Due to this he presented to the urgency center, where he was noted to have a mildly elevated troponin.  CTA chest/abdomen/pelvis was without acute findings.  He was transferred to [facility] emergency room for further evaluation.  He reports for months he has had fatigue and mild shortness of breath with walking upstairs.  Denies any similar chest pain to what he experienced today with activity recently.  He has a history of ulcerative colitis, reports prior to his episodes of diarrhea today his stools have been formed and occurring generally once a day and overall his UC is well controlled. He reports the day prior to presentation, he ate at [facility] [employer], ate a steak and a salad.  He reports his brother-in-law and wife ate similar meals, and have not been ill with similar symptoms. He denies any further episodes of nausea, vomiting or diarrhea since being at home.     In the Emergency Department, the patient was seen by Dr. Lopez, with whom I discussed the patient's presenting symptoms and emergency department course.  Initial vital signs were a temperature of 98.1F, HR 72, /60, RR 18, SpO2 96% on room air. Pertinent work-up as noted in A&P. The patient received aspirin and Hospitalists were contacted for admission to the hospital.     Past Medical History    I have reviewed this patient's medical history and updated it with  pertinent information if needed.   Past Medical History:   Diagnosis Date    Abdominal pain     Anemia     currently taking iron    Back pain     since 1980    BPH (benign prostatic hyperplasia)     Bruit     CAD (coronary artery disease)     Cellulitis 10/18/2012    Cellulitis 05/2018    GrpB strep LLE cellulitis  negative RACHAEL for veg    Chronic venous insufficiency     bilat lower extremities    Contact dermatitis and other eczema, due to unspecified cause     Diaphragmatic hernia without mention of obstruction or gangrene     Diastolic HF (heart failure) (H)     Gastric ulcer     Hypertension 08/06/2013    Lumbago     Mesenteric artery insufficiency (H)     known AAA and narrowing of intestinal artery's  followed by dr raymond    Metabolic syndrome     Mitral valve disease     s/p mechanical MVR, prior mech AVR    Nonallopathic lesion of lumbar region     Nonallopathic lesion of rib cage     Nonallopathic lesion of sacral region     GAGE (obstructive sleep apnea)     CPAP    Paroxysmal atrial fibrillation (H) 10/18/2012    occured after stopping BB  (prior  aflutter ablation)    Prostate cancer (H) 2008    radiation seed, XRT     PVD (peripheral vascular disease) (H)     Rotator cuff strain     and sprain    S/P AAA repair     S/P aortic valve replacement 2006    developed perivalve leak and MS, therefore redo surg 2013    S/P CABG (coronary artery bypass graft) 2006    Lima-Lad, RA-Rca    Sciatica of left side     since 2000    Sepsis due to group B Streptococcus (H) 05/19/2018    TIA (transient ischemic attack) 08/01/2022    Total knee replacement status 07/22/2019    Ulcerative colitis (H)     Varicose veins of bilateral lower extremities with other complications     s/p RLE vein stripping    Vitamin D deficiency        Past Surgical History   I have reviewed this patient's surgical history and updated it with pertinent information if needed.  Past Surgical History:   Procedure Laterality Date    AORTIC VALVE  REPLACEMENT  1/3/06    redo AVR SJM 21mm and SJM MVR 27mm in 2013SJM 21([id number]):AVR, SJM 27 :MVR-    APPLY WOUND VAC N/A 11/12/2019    Procedure: APPLICATION, WOUND VAC;  Surgeon: Sara Martinez MD;  Location:  OR    ARTHROPLASTY KNEE      right knee    ARTHROPLASTY KNEE Right 7/22/2019    Procedure: Right total knee arthroplasty;  Surgeon: Prasanth Jensen MD;  Location: [facility] OR    BACK SURGERY  Oct 2015    Fusion L4-5, laminectomy L2, L3    BYPASS GRAFT ARTERY CORONARY  10/2013    reimplantation of radial artery graft to RCA    CARDIAC CATHERIZATION  11/2005    Stent placed to RCA    CARDIAC CATHERIZATION  04/2013    Occluded RCA, patent LIMA to LAD and radial graft to PDA    CARPAL TUNNEL RELEASE RT/LT  1994    COLONOSCOPY  8-22-11    CYSTOSCOPY FLEXIBLE  10/16/2013    Procedure: CYSTOSCOPY FLEXIBLE;  FLEXIBLE CYSTOSCOPY / DILATION OF URETHRA / INSERTION OF LESLIE;  Surgeon: Cooper Wallace MD;  Location:  OR    ENDOVASCULAR REPAIR, INFRARENAL ABDOMINAL AORTIC ANEURYSM/DISSECTION; MODULAR BIFURCATED PROSTHESIS  2006    AAA repair endovascular    ENT SURGERY      EP ABLATION ATRIAL FLUTTER N/A 4/22/2019    Procedure: EP Ablation Atrial Flutter;  Surgeon: Jessy Vasquez MD;  Location:  HEART CARDIAC CATH LAB    EP PACEMAKER DEVICE & LEAD IMPLANT- RIGHT ATRIAL & RIGHT VENTRICULAR N/A 8/2/2022    Procedure: Pacemaker Device & Lead Implant - Right Atrial & Right Ventricular;  Surgeon: Jessy Vasquez MD;  Location:  HEART CARDI[facility] CATH LAB    GENITOURINARY SURGERY  6/16/08    radioactive seeding    GRAFT FLAP PEDICLE EXTREMITY (LOCATION) Right 11/12/2019    Procedure: SURGICAL PROCUREMENT, FLAP, PEDICLE, EXTREMITY;  Surgeon: Sara Martinez MD;  Location: [facility] OR    GRAFT SKIN SPLIT THICKNESS FROM EXTREMITY Right 11/12/2019    Procedure: RIGHT GASTRONEMIUS FLAP TO RIGHT KNEE, SPLIT THICKNESS SKIN GRAFT FROM RIGHT THIGH TO RIGHT KNEE, SURGICAL PROCUREMENT,  FLAP, PEDICLE, EXTREMITY, WOUND VAC PLACEMENT;  Surgeon: Sara Martinez MD;  Location:  OR    HEAD & NECK SURGERY  1997    vocal cord polypectomy    INCISION AND DRAINAGE KNEE, COMBINED Right 8/29/2019    Procedure: INCISION AND DRAINAGE, KNEE W/ Secondary Wound Closure;  Surgeon: Prasanth Jensen MD;  Location:  OR    IRRIGATION AND DEBRIDEMENT KNEE, PLACE ANTIBIOTIC CEMENT BEADS / SPACE Right 2/12/2020    Procedure: 1. Irrigation and debridement of wound breakdown, right total knee arthroplasty.  2. Irrigation and debridement of right total knee with placement of antibiotic-impregnated (vancomycin) absorbable antibiotic beads.;  Surgeon: Prasanth Jensen MD;  Location: [facility] OR    IRRIGATION AND DEBRIDEMENT LOWER EXTREMITY, COMBINED Right 12/8/2019    Procedure: DEBRIDEMENT OF RIGHT CALF AND WOUND CLOSURE.;  Surgeon: Sara Martinez MD;  Location:  OR    IRRIGATION AND DEBRIDEMENT UPPER EXTREMITY, COMBINED Right 1/7/2023    Procedure: Right elbow arthrotomy, irrigation and debridement;  Surgeon: Jose A Christine MD;  Location:  OR    KNEE SURGERY  2001 Right knee arthroscopy    OPTICAL TRACKING SYSTEM FUSION SPINE POSTERIOR LUMBAR THREE+ LEVELS N/A 10/29/2015    Procedure: OPTICAL TRACKING SYSTEM FUSION SPINE POSTERIOR LUMBAR THREE+ LEVELS;  Surgeon: Walt Garcia MD;  Location:  OR    PROSTATE SURGERY      radioactive seeding 6/16/08    REPAIR ANEURYSM ABDOMINAL AORTA  06/08    REPAIR VALVE MITRAL  10/16/2013    SJM 21([id number]):AVR, SJM 27 :MVRProcedure: REPAIR VALVE MITRAL;  REDO STERNOTOMY/REDO AORTIC VALVE REPLACEMENT/ MITRAL VALVE REPLACEMENT/REIMPLANTATION OF RIGHT CORONARY ARTERY BYPASS WITH RACHAEL ( ON PUMP);  Surgeon: Viet Singh MD;  Location:  OR    REPLACE VALVE AORTIC  10/16/2013    Procedure: REPLACE VALVE AORTIC;;  Surgeon: Viet Singh MD;  Location:  OR    SURGERY GENERAL IP CONSULT  05/2008 Excision aneurysm abdominal aorta     SURGERY GENERAL IP CONSULT   Vocal cord polypectomy    VASCULAR SURGERY  1993     varicose vein stripping    ZZC CABG, VEIN, TWO  1/3/06    Left radial to RCA, LIMA to LAD (RA to RCA reimplanted at time of 2013 surg)       Social History   I have reviewed this patient's social history and updated it with pertinent information if needed.  Social History     Tobacco Use    Smoking status: Former     Current packs/day: 0.00     Average packs/day: 1 pack/day for 40.5 years (40.5 ttl pk-yrs)     Types: Cigarettes     Start date: 4/15/1962     Quit date: 10/23/2002     Years since quittin.0    Smokeless tobacco: Never   Vaping Use    Vaping status: Never Used   Substance Use Topics    Alcohol use: Yes     Comment: a couple beers per week (socially)    Drug use: No       Family History   I have reviewed this patient's family history and updated it with pertinent information if needed.   Family History   Problem Relation Age of Onset    No Known Problems Mother     Coronary Artery Disease Father         CABG    Heart Disease Father         Pacemaker    No Known Problems Brother     No Known Problems Sister     No Known Problems Son     Other Cancer Daughter     No Known Problems Daughter     Heart Disease Brother     Other - See Comments Grandchild         Prior to Admission Medications    Prior to Admission Medications   Prescriptions Last Dose Informant Patient Reported? Taking?   acetaminophen (TYLENOL) 325 MG tablet   Yes Yes   Sig: Take 650 mg by mouth 2 times daily.   amoxicillin-clavulanate (AUGMENTIN) 875-125 MG tablet 2024 Morning  No Yes   Sig: Take 1 tablet by mouth daily.   cholecalciferol 25 MCG (1000 UT) TABS 2024 Morning Self Yes Yes   Sig: Take 1,000 Units by mouth daily   enoxaparin ANTICOAGULANT (LOVENOX) 100 MG/ML syringe Not Taking  No No   Sig: Inject 0.9 mLs (90 mg) subcutaneously every 12 hours.   Patient not taking: Reported on 2024   ezetimibe (ZETIA) 10 MG tablet  11/12/2024 Morning  No Yes   Sig: Take 1 tablet (10 mg) by mouth daily.   ferrous sulfate (FEROSUL) 325 (65 Fe) MG tablet 11/12/2024 Morning Self Yes Yes   Sig: Take 325 mg by mouth daily (with breakfast)   fluticasone (FLONASE) 50 MCG/ACT nasal spray   No Yes   Sig: Spray 1 spray into both nostrils daily.   Patient taking differently: Spray 1 spray into both nostrils daily as needed.   glucosamine-chondroitin 500-400 MG CAPS per capsule 11/12/2024 Morning Self Yes Yes   Sig: Take 1 capsule by mouth 2 times daily.   mesalamine (ASACOL HD) 800 MG EC tablet 11/12/2024 Morning  Yes Yes   Sig: Take 800 mg by mouth 2 times daily.   metoprolol succinate ER (TOPROL XL) 50 MG 24 hr tablet 11/12/2024 Morning  No Yes   Sig: Take 1 1/2 tab (75mg) daily   rosuvastatin (CRESTOR) 40 MG tablet 11/11/2024 Evening  No Yes   Sig: Take 1 tablet (40 mg) by mouth daily.   tamsulosin (FLOMAX) 0.4 MG capsule 11/11/2024 Evening  No Yes   Sig: Take 1 capsule (0.4 mg) by mouth daily.   warfarin ANTICOAGULANT (COUMADIN) 5 MG tablet 11/11/2024 Evening  No Yes   Sig: Take 1 tablet (5mg) every Mon,Wed & Fri / Take 1 1/2 tablets (7.5mg) all other days OR per INR clinic   Patient taking differently: 7.5 mg MWF, 5 mg ROW      Facility-Administered Medications: None     Allergies   Allergies   Allergen Reactions    Bees Anaphylaxis       Physical Exam   Vital Signs: Temp: 98.1  F (36.7  C) Temp src: Oral BP: 121/69 Pulse: 70   Resp: 22 SpO2: 94 % O2 Device: None (Room air)    Weight: 0 lbs 0 oz    Constitutional: Well-appearing, NAD  Respiratory: Clear to auscultation bilaterally, good air movement, normal effort   Cardiovascular: RRR, + valve click. No peripheral edema.    GI: Soft, non-tender, non-distended. No rebound tenderness or guarding.   Skin: Warm, dry   Neurologic: Alert. Responding to questions appropriately. Following commands.    Psychiatric: Normal affect, appropriate      Data   Data reviewed today: I reviewed all medications, new  labs and imaging results over the last 24 hours. I personally reviewed the EKG tracing showing V paced rhythm with frequent PVCs .    Recent Labs   Lab 11/12/24  2106   WBC 9.6   HGB 13.3   MCV 88         POTASSIUM 4.2   CHLORIDE 102   CO2 23   BUN 16.3   CR 0.90   ANIONGAP 11   AWILDA 9.1   *   ALBUMIN 4.0   PROTTOTAL 7.3   BILITOTAL 0.3   ALKPHOS 60   ALT 30   AST 36   LIPASE 31       No results found for this or any previous visit (from the past 24 hours).

## 2024-11-13 NOTE — ED NOTES
Bed: ED28  Expected date: 11/12/24  Expected time: 8:15 PM  Means of arrival: Ambulance  Comments:  [facility] 84M chest pain

## 2024-11-13 NOTE — ED NOTES
Called [facility] med about awaiting results: *[phone]. Discharge pending results.

## 2024-11-13 NOTE — PROGRESS NOTES
[facility]    Medicine Progress Note - Hospitalist Service    Date of Admission:  11/12/2024    Assessment & Plan   Fausto Farr is a 84 year-old male with past medical history significant for coronary artery disease with prior CABG, mechanical aortic and mitral valve and afib/flutter, CHB s/p PPM, HLD, GAGE on CPAP who was registered to observation on 11/12/2024 with chest pain.     Chest pain   Coronary artery disease with prior 2v CABG  *Presented with central chest ache, non-exertional, followed by n/v/d (see below). EKG V-paced rhythm with frequent PVCs. Troponin 38->35. Last stress test (NM Lexiscan) 4/2019 negative for ischemia. Recent ECHO noted as below, EF has decreased from 55-60%  in 2022 to 45-40%, found to be in Afib for past 5-6 months on recent device check, since cardioversion 5/2024.   *Given associated n/v/d, suspicion for GI etiology of symptoms.    - Observation status  - Aspirin 81mg/d  - Cardiac monitoring  - NM Lexiscan for risk stratification is abnormal, given extensive cardiology history and abnormal stress test, will ask cardiology to weigh in  - NTG SL PRN   - Disposition pending cardiology recommendations, will plan to discharge to home when cleared by cardiology, 11/13/24 or 11/14/24    Nausea, vomiting, diarrhea, improving  Presented with initial symptoms of lower abdominal comfort and later 3-4 episodes of nausea and vomiting, 4 episodes of watery diarrhea. Ate at [employer] day prior to symptom onset, family ate similar meals without symptoms. 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 Symptoms improving on 11/13/24   - Check  enteric stool studies given sick contact  - Ondansetron IV/PO, prochlorperazine IV/PO PRN  - Maalox PRN  - Supportive cares     Atypical atrial flutter and atrial fibrillation s/p DCCV 5/2024  Complete heart block s/p PPM  V paced. Recent device check 11/6 shows patient has been back in Afib since mid June. Reported dyspnea with climbing stairs but denied with other activities. COVID in Sept and pneumonia in October. Recent ECHO 11/6/24 showed decline in EF since 2022 study from 55-60% to 45-50%. Afib and paced. Mild global hypokinesia of LV. Moderate cLVH. Moderate biatrial enlargement, mechanical mitral valve and AVR. No thrombus in LV.   - Continue prior to admission metoprolol XL with hold parameters  - Anticoagulation managed as below    S/p mechanical AVR 2006 with redo 2013 for perivalvular leak  S/p mechanical MVR 2013  Coagulopathy secondary to warfarin   INR 3.1-2.3, held dose evening 11/12 for stress test. Goal is 2.5-3.5 in setting of MVR.   - Hold warfarin pending cardiology consultation, if no angiogram is recommended then will post pharmacy to dose warfarin   - Daily INR    S/p endovascular AAA repair with persistent endoleak  Slightly increased on CTA compared to 8/2024. Follows with interventional radiology with serial monitoring. Unlikely contributing to acute symptoms.     Obstructive sleep apnea: Continue CPAP per home settings    Hyperlipidemia: Hold prior to admission ezetimibe, rosuvastatin while observation status     Ulcerative colitis  Reports symptoms recently stable prior to diarrhea above.   - Continue prior to admission mesalamine     BPH: Continue prior to admission tamsulosin    Hx of septic arthritis: Continue prior to admission amoxicillin-clavulanate PO       Observation Goals: List all goals to be met before discharge home: , - Serial troponins and stress test complete., - Seen and cleared by consultant if applicable, - Adequate pain control on oral analgesia, - Vital signs normal  "or at patient baseline, - Safe disposition plan has been identified, - Nurse to notify provider when observation goals have been met and patient is ready for discharge.  Diet: NPO for Medical/Clinical Reasons Except for: Meds    DVT Prophylaxis: Warfarin  Lord Catheter: Not present  Lines: None     Cardiac Monitoring: ACTIVE order. Indication: Chest pain/ ACS rule out (24 hours)  Code Status: Full Code      Clinically Significant Risk Factors Present on Admission                # Drug Induced Coagulation Defect: home medication list includes an anticoagulant medication    # Hypertension: Noted on problem list  # Heart failure, NOS: heart failure noted on the problem list and last echo with EF 40-50%         # DMII: A1C = 6.9 % (Ref range: 0.0 - 5.6 %) within past 6 months    # Obesity: Estimated body mass index is 33.85 kg/m  as calculated from the following:    Height as of 11/4/24: 1.651 m (5' 5\").    Weight as of 11/4/24: 92.3 kg (203 lb 6.4 oz).        # Pacemaker present  # History of CABG: noted on surgical history       Disposition Plan     Medically Ready for Discharge: Anticipated Tomorrow           The patient's care was discussed with the Attending Physician, Dr. Ramos, Bedside Nurse, Patient, and Patient's Family.    Diane Ignacio PA-C  Hospitalist Service  [facility]  Securely message with [employer] (more info)  Text page via [facility] Paging/Directory   ______________________________________________________________________    Interval History   Seen and examined. No recurrence of CP. BRISENO with stairs but not other scenarios. Some nausea, no emesis. NPO today for stress, tolerated sandwich last night. No wife with similar symptoms feeling nauseated. Denies palpitations or abd pain. 1 bout loose stool this morning. Didn't sleep well last night, feels foggy but able to follow conversation appropriately. Questions welcomed and answered to the best of my knowledge from patient " and wife.    Physical Exam   Vital Signs: Temp: 98.1  F (36.7  C) Temp src: Oral BP: 108/63 Pulse: 70   Resp: 19 SpO2: 94 % O2 Device: None (Room air)    Weight: 0 lbs 0 oz    Physical Exam    General: Awake, alert, very pleasant man who appears stated age. Looks comfortable sitting up in bed. No acute distress.  HEENT: Normocephalic, atraumatic. Extraocular movements intact. Pupils equal round and reactive to light bilaterally.   Respiratory: Clear to auscultation bilaterally, no rales, wheezing, or rhonchi.  Cardiovascular: Regular rate and rhythm, +S1 and S2, mechanical sounding valves. No peripheral edema.   Gastrointestinal: Soft, non-tender, non-distended. Bowel sounds present.  Skin: Warm, dry. No obvious rashes or lesions on exposed skin. Dorsalis pedis pulses palpable bilaterally.  Musculoskeletal: No joint swelling, erythema or tenderness. Moves all extremities equally.  Neurologic: AAO x3.   Psychiatric: Appropriate mood and affect. No obvious anxiety or depression.      Medical Decision Making       >45 MINUTES SPENT BY ME on the date of service doing chart review, history, exam, documentation & further activities per the note.      Data     I have personally reviewed the following data over the past 24 hrs:    9.6  \   13.3   / 251     136 102 16.3 /  135 (H)   4.2 23 0.90 \     ALT: 30 AST: 36 AP: 60 TBILI: 0.3   ALB: 4.0 TOT PROTEIN: 7.3 LIPASE: 31     Trop: 35 (H) BNP: N/A     INR:  2.33 (H) PTT:  N/A   D-dimer:  N/A Fibrinogen:  N/A       Imaging results reviewed over the past 24 hrs:   Recent Results (from the past 24 hours)   NM Lexiscan stress test (nuc card)   Result Value    Target     Baseline Systolic     Baseline Diastolic BP 68    Last Stress Systolic     Last Stress Diastolic BP 59    Baseline HR 72    Max HR  71    Max Predicted HR  52    Rate Pressure Product 8,023.0    BP 43    Left Ventricular EF 44    Narrative      The nuclear stress test is abnormal.    TID is  absent.    There is a medium sized area of a moderate degree of nontransmural   infarction in the apical and inferior segment(s) of the left ventricle   associated with a small area of a mild degree of navin-infarct ischemia.    Left ventricular function is mildly reduced.    The left ventricular ejection fraction at rest is 43%.  The left   ventricular ejection fraction at stress is 44%.    A prior study was conducted on 4/18/2019.  This study has no change   when compared with the prior study.

## 2024-11-13 NOTE — PHARMACY-ADMISSION MEDICATION HISTORY
Pharmacist Admission Medication History    Admission medication history is complete. The information provided in this note is only as accurate as the sources available at the time of the update.    Information Source(s): Patient, Clinic records, and CareEverywhere/SureScripts via in-person    Changes made to PTA medication list:  Added: mesalamine   Deleted: None  Changed: warfarin dosing     Allergies reviewed with patient and updates made in EHR: no    Medication History Completed By: Meli Ortiz RPH 11/12/2024 9:32 PM    PTA Med List   Medication Sig Last Dose/Taking    acetaminophen (TYLENOL) 325 MG tablet Take 650 mg by mouth 2 times daily. Taking    amoxicillin-clavulanate (AUGMENTIN) 875-125 MG tablet Take 1 tablet by mouth daily. 11/12/2024 Morning    cholecalciferol 25 MCG (1000 UT) TABS Take 1,000 Units by mouth daily 11/12/2024 Morning    ezetimibe (ZETIA) 10 MG tablet Take 1 tablet (10 mg) by mouth daily. 11/12/2024 Morning    ferrous sulfate (FEROSUL) 325 (65 Fe) MG tablet Take 325 mg by mouth daily (with breakfast) 11/12/2024 Morning    fluticasone (FLONASE) 50 MCG/ACT nasal spray Spray 1 spray into both nostrils daily. (Patient taking differently: Spray 1 spray into both nostrils daily as needed.) Taking Differently    glucosamine-chondroitin 500-400 MG CAPS per capsule Take 1 capsule by mouth 2 times daily. 11/12/2024 Morning    mesalamine (ASACOL HD) 800 MG EC tablet Take 800 mg by mouth 2 times daily. 11/12/2024 Morning    metoprolol succinate ER (TOPROL XL) 50 MG 24 hr tablet Take 1 1/2 tab (75mg) daily 11/12/2024 Morning    rosuvastatin (CRESTOR) 40 MG tablet Take 1 tablet (40 mg) by mouth daily. 11/11/2024 Evening    tamsulosin (FLOMAX) 0.4 MG capsule Take 1 capsule (0.4 mg) by mouth daily. 11/11/2024 Evening    warfarin ANTICOAGULANT (COUMADIN) 5 MG tablet Take 1 tablet (5mg) every Mon,Wed & Fri / Take 1 1/2 tablets (7.5mg) all other days OR per INR clinic (Patient taking differently: 7.5  mg MWF, 5 mg ROW) 11/11/2024 Evening

## 2024-11-13 NOTE — ED TRIAGE NOTES
[facility] from [facility] where he presented with Chest Pressure for the past 2 days, accompanied by wife. Labs had elevated Trops. VSS. Patient has a PPM.     Triage Assessment (Adult)       Row Name 11/12/24 2039          Triage Assessment    Airway WDL WDL        Respiratory WDL    Respiratory WDL WDL        Skin Circulation/Temperature WDL    Skin Circulation/Temperature WDL WDL        Cardiac WDL    Cardiac WDL X  Chest Pressure     Cardiac Rhythm --  Paced        Peripheral/Neurovascular WDL    Peripheral Neurovascular WDL WDL        Cognitive/Neuro/Behavioral WDL    Cognitive/Neuro/Behavioral WDL WDL

## 2024-11-13 NOTE — PROGRESS NOTES
[facility] Procedure Nursing Note    Patient Information  Name: Fausto Farr  Age: 84 year old    Procedure  Procedure: lexiscan  Procedure start time: 0940  Procedure complete time:   Concerns/abnormal assessment: no  If abnormal assessment, provider notified: N/A  Plan/Other: Lexiscan performed in EKG, VSS, pt states 1/10 chest pain prior to start and this was unchanged during scan, denies any other symptoms. Pt returned to room in ER via cart by KIMBERLY Riggs RN

## 2024-11-13 NOTE — PROGRESS NOTES
RECEIVING UNIT ED HANDOFF REVIEW    ED Nurse Handoff Report was reviewed by: Paige Jeter, RN on November 13, 2024 at 2:39 PM

## 2024-11-13 NOTE — CONSULTS
[facility]    ~Cardiology Consultation~  Primary Cardiologist: Dr. Santo/Dr. Vasquez    Date of Admission: 11/12/2024  Service Date: 11/13/24    Summary  Mr. Fausto Farr is a very pleasant 84 year old male with a past medical history of valvular heart disease (AVR in 2006 with subsequent perivalvular leak and redo mechanical AVR and mechanical MVR in 2013; on warfarin), coronary artery disease (CABG x2 LIMA-LAD and radial graft to RCA in 2006), chronic diastolic heart failure, previous endovascular AAA repair, complete AV block (status post PPM in 2022), obstructive sleep apnea (CPAP compliant), hypertension, dyslipidemia, history of typical atrial flutter (successful ablation in 2019) varicose veins with venous insufficiency (treatment with VenaSeal closure, sclerotherapy and phlebectomy in 2019), and asymptomatic NSVT who was evaluated in urgent care for nausea, diarrhea and chest pain.  Troponin was elevated and recommendation was to seek evaluation in the emergency department. He was admitted on 11/12/2014 .  Cardiology was asked to consult on this patient for chest pain and an abnormal stress test I think so yeah kind of a panel.    Patient underwent Lexiscan stress test today which was noted to be abnormal with a small area of navin-infarct ischemia.     Most recent echocardiogram from 11/6/2024 noted a decrease in LV systolic function from 55-60% in 2022 to 45-50%.    Denies chest pain, palpitations, lightheadedness, or dizziness. Has had dyspnea on exertion for the past two years, this has remained stable.          Assessment:     NSTEMI  Elevated troponin with flat trajectory, peaked at 38  Echo 11/6/2024- LVEF 45-50% with WMA  Currently chest pain free     New cardiomyopathy   LVEF in 2022 was 55-60% with new decline in LVEF to 45-50%   Appearing euvolemic    Coronary artery disease   S/p 2v CABG in 2006 (LIMA to LAD and radial graft to RCA)    Nausea/vomitting   Diarrhea      Valvular heart disease   S/p AVR in 2006 with subsequent perivalvular leak and redo mechanical AVR and mechanical MVR in 2013  Anticoagulated with warfarin PTA  INR 3.1 on admission, warfarin held pending stress test results with the anticipation of possible angiogram     5.   Complete AV block    S/p PPM in 2022    6.   HTN   Controlled     7.   Dyslipidemia   LDL 7/2024- 66  Managed with rosuvastatin 40 mg daily            Plan:     Reviewed lexiscan stress results with the patient. Recommend proceeding with a coronary angiogram tomorrow   Okay to eat today from cardiology standpoint   Start heparin gtt   NPO at midnight   Cardiology to follow     Plan of care was formulated under direction and guidance of Dr. Rai.    Keyanna Lee PAJEOVANY   Physician Assistant   [facility]      Code Status    Full Code    Reason for Consult   Reason for consult: I was asked by Diane Ignacio CNP to evaluate this patient for chest pain and an abnormal stress test.    Primary Care Physician   Chris Flower    History of Present Illness   Mr. Fausto Farr is a very pleasant 84 year old male with a past medical history of valvular heart disease (AVR in 2006 with subsequent perivalvular leak and redo mechanical AVR and mechanical MVR in 2013), coronary artery disease (CABG x2 LIMA-LAD and radial graft to RCA in 2006), chronic diastolic heart failure, previous endovascular AAA repair, complete AV block (status post PPM in 2022), obstructive sleep apnea (CPAP compliant), hypertension, dyslipidemia, history of typical atrial flutter (successful ablation in 2019) varicose veins with venous insufficiency (treatment with VenaSeal closure, sclerotherapy and phlebectomy in 2019), and asymptomatic NSVT who presented to the emergency department from urgent care with an elevated troponin and chest pain on 11/12/2024.     Patient went out to dinner 2 nights ago to celebrate [employer] and had episodes of vomiting  and diarrhea the following morning.  This was also accompanied with localized midsternal chest pain.  He did not take anything to help relieve the chest discomfort.  He presented to urgent care yesterday for further evaluation.  His troponin was mildly elevated and he was recommended to seek further evaluation in the emergency room.    Upon admission, troponin was 38.  INR 3.1.  EKG with no acute ST changes.    Past Medical History   Past Medical History:   Diagnosis Date    Abdominal pain     Anemia     currently taking iron    Back pain     since 1980    BPH (benign prostatic hyperplasia)     Bruit     CAD (coronary artery disease)     Cellulitis 10/18/2012    Cellulitis 05/2018    GrpB strep LLE cellulitis  negative RACHAEL for veg    Chronic venous insufficiency     bilat lower extremities    Contact dermatitis and other eczema, due to unspecified cause     Diaphragmatic hernia without mention of obstruction or gangrene     Diastolic HF (heart failure) (H)     Gastric ulcer     Hypertension 08/06/2013    Lumbago     Mesenteric artery insufficiency (H)     known AAA and narrowing of intestinal artery's  followed by dr raymond    Metabolic syndrome     Mitral valve disease     [facility]    Nonallopathic lesion of lumbar region     Nonallopathic lesion of rib cage     Nonallopathic lesion of sacral region     GAGE (obstructive sleep apnea)     CPAP    Paroxysmal atrial fibrillation (H) 10/18/2012    occured after stopping BB  (prior  aflutter ablation)    Prostate cancer (H) 2008    radiation seed, XRT     PVD (peripheral vascular disease) (H)     Rotator cuff strain     and sprain    S/P AAA repair     S/P aortic valve replacement 2006    developed perivalve leak and MS, therefore redo surg 2013    S/P CABG (coronary artery bypass graft) 2006    Lima-Lad, RA-Rca    Sciatica of left side     since 2000    Sepsis due to group B Streptococcus (H) 05/19/2018    TIA (transient ischemic attack)  08/01/2022    Total knee replacement status 07/22/2019    Ulcerative colitis (H)     Varicose veins of bilateral lower extremities with other complications     s/p RLE vein stripping    Vitamin D deficiency        Past Surgical History   I have reviewed this patient's surgical history and updated it with pertinent information if needed.  Past Surgical History:   Procedure Laterality Date    AORTIC VALVE REPLACEMENT  1/3/06    redo AVR SJM 21mm and SJM MVR 27mm in 2013SJM 21([id number]):AVR, SJM 27 :MVR-    APPLY WOUND VAC N/A 11/12/2019    Procedure: APPLICATION, WOUND VAC;  Surgeon: Sara Martinez MD;  Location:  OR    ARTHROPLASTY KNEE      right knee    ARTHROPLASTY KNEE Right 7/22/2019    Procedure: Right total knee arthroplasty;  Surgeon: Prasanth Jensen MD;  Location:  OR    BACK SURGERY  Oct 2015    Fusion L4-5, laminectomy L2, L3    BYPASS GRAFT ARTERY CORONARY  10/2013    reimplantation of radial artery graft to RCA    CARDIAC CATHERIZATION  11/2005    Stent placed to RCA    CARDIAC CATHERIZATION  04/2013    Occluded RCA, patent LIMA to LAD and radial graft to PDA    CARPAL TUNNEL RELEASE RT/LT  1994    COLONOSCOPY  8-22-11    CYSTOSCOPY FLEXIBLE  10/16/2013    Procedure: CYSTOSCOPY FLEXIBLE;  FLEXIBLE CYSTOSCOPY / DILATION OF URETHRA / INSERTION OF LESLIE;  Surgeon: Cooper Wallace MD;  Location:  OR    ENDOVASCULAR REPAIR, INFRARENAL ABDOMINAL AORTIC ANEURYSM/DISSECTION; MODULAR BIFURCATED PROSTHESIS  2006    AAA repair endovascular    ENT SURGERY      EP ABLATION ATRIAL FLUTTER N/A 4/22/2019    Procedure: EP Ablation Atrial Flutter;  Surgeon: Jessy Vasquez MD;  Location:  HEART CARDIAC CATH LAB    EP PACEMAKER DEVICE & LEAD IMPLANT- RIGHT ATRIAL & RIGHT VENTRICULAR N/A 8/2/2022    Procedure: Pacemaker Device & Lead Implant - Right Atrial & Right Ventricular;  Surgeon: Jessy Vasquez MD;  Location:  HEART CARDIAC CATH LAB    GENITOURINARY SURGERY   6/16/08    radioactive seeding    GRAFT FLAP PEDICLE EXTREMITY (LOCATION) Right 11/12/2019    Procedure: SURGICAL PROCUREMENT, FLAP, PEDICLE, EXTREMITY;  Surgeon: Sara Martinez MD;  Location: [facility] OR    GRAFT SKIN SPLIT THICKNESS FROM EXTREMITY Right 11/12/2019    Procedure: RIGHT GASTRONEMIUS FLAP TO RIGHT KNEE, SPLIT THICKNESS SKIN GRAFT FROM RIGHT THIGH TO RIGHT KNEE, SURGICAL PROCUREMENT, FLAP, PEDICLE, EXTREMITY, WOUND VAC PLACEMENT;  Surgeon: Sara Martinez MD;  Location:  OR    HEAD & NECK SURGERY  1997    vocal cord polypectomy    INCISION AND DRAINAGE KNEE, COMBINED Right 8/29/2019    Procedure: INCISION AND DRAINAGE, KNEE W/ Secondary Wound Closure;  Surgeon: Prasanth Jensen MD;  Location:  OR    IRRIGATION AND DEBRIDEMENT KNEE, PLACE ANTIBIOTIC CEMENT BEADS / SPACE Right 2/12/2020    Procedure: 1. Irrigation and debridement of wound breakdown, right total knee arthroplasty.  2. Irrigation and debridement of right total knee with placement of antibiotic-impregnated (vancomycin) absorbable antibiotic beads.;  Surgeon: Prasanth Jensen MD;  Location: [facility] OR    IRRIGATION AND DEBRIDEMENT LOWER EXTREMITY, COMBINED Right 12/8/2019    Procedure: DEBRIDEMENT OF RIGHT CALF AND WOUND CLOSURE.;  Surgeon: Sara Martinez MD;  Location:  OR    IRRIGATION AND DEBRIDEMENT UPPER EXTREMITY, COMBINED Right 1/7/2023    Procedure: Right elbow arthrotomy, irrigation and debridement;  Surgeon: Jose A Christine MD;  Location:  OR    KNEE SURGERY  2001 Right knee arthroscopy    OPTICAL TRACKING SYSTEM FUSION SPINE POSTERIOR LUMBAR THREE+ LEVELS N/A 10/29/2015    Procedure: OPTICAL TRACKING SYSTEM FUSION SPINE POSTERIOR LUMBAR THREE+ LEVELS;  Surgeon: Walt Garcia MD;  Location:  OR    PROSTATE SURGERY      radioactive seeding 6/16/08    REPAIR ANEURYSM ABDOMINAL AORTA  06/08    REPAIR VALVE MITRAL  10/16/2013    SJM 21([id number]):AVR, SJM 27 :MVRProcedure: REPAIR VALVE MITRAL;   REDO STERNOTOMY/REDO AORTIC VALVE REPLACEMENT/ MITRAL VALVE REPLACEMENT/REIMPLANTATION OF RIGHT CORONARY ARTERY BYPASS WITH RACHAEL ( ON PUMP);  Surgeon: Viet Singh MD;  Location:  OR    REPLACE VALVE AORTIC  10/16/2013    Procedure: REPLACE VALVE AORTIC;;  Surgeon: Viet Singh MD;  Location:  OR    SURGERY GENERAL IP CONSULT  05/2008 Excision aneurysm abdominal aorta    SURGERY GENERAL IP CONSULT  1997 Vocal cord polypectomy    VASCULAR SURGERY  1968, 1993     varicose vein stripping    ZZC CABG, VEIN, TWO  1/3/06    Left radial to RCA, LIMA to LAD (RA to RCA reimplanted at time of 2013 surg)       Prior to Admission Medications   Prior to Admission Medications   Prescriptions Last Dose Informant Patient Reported? Taking?   acetaminophen (TYLENOL) 325 MG tablet   Yes Yes   Sig: Take 650 mg by mouth 2 times daily.   amoxicillin-clavulanate (AUGMENTIN) 875-125 MG tablet 11/12/2024 Morning  No Yes   Sig: Take 1 tablet by mouth daily.   cholecalciferol 25 MCG (1000 UT) TABS 11/12/2024 Morning Self Yes Yes   Sig: Take 1,000 Units by mouth daily   enoxaparin ANTICOAGULANT (LOVENOX) 100 MG/ML syringe Not Taking  No No   Sig: Inject 0.9 mLs (90 mg) subcutaneously every 12 hours.   Patient not taking: Reported on 11/12/2024   ezetimibe (ZETIA) 10 MG tablet 11/12/2024 Morning  No Yes   Sig: Take 1 tablet (10 mg) by mouth daily.   ferrous sulfate (FEROSUL) 325 (65 Fe) MG tablet 11/12/2024 Morning Self Yes Yes   Sig: Take 325 mg by mouth daily (with breakfast)   fluticasone (FLONASE) 50 MCG/ACT nasal spray   No Yes   Sig: Spray 1 spray into both nostrils daily.   Patient taking differently: Spray 1 spray into both nostrils daily as needed.   glucosamine-chondroitin 500-400 MG CAPS per capsule 11/12/2024 Morning Self Yes Yes   Sig: Take 1 capsule by mouth 2 times daily.   mesalamine (ASACOL HD) 800 MG EC tablet 11/12/2024 Morning  Yes Yes   Sig: Take 800 mg by mouth 2 times daily.   metoprolol  succinate ER (TOPROL XL) 50 MG 24 hr tablet 11/12/2024 Morning  No Yes   Sig: Take 1 1/2 tab (75mg) daily   rosuvastatin (CRESTOR) 40 MG tablet 11/11/2024 Evening  No Yes   Sig: Take 1 tablet (40 mg) by mouth daily.   tamsulosin (FLOMAX) 0.4 MG capsule 11/11/2024 Evening  No Yes   Sig: Take 1 capsule (0.4 mg) by mouth daily.   warfarin ANTICOAGULANT (COUMADIN) 5 MG tablet 11/11/2024 Evening  No Yes   Sig: Take 1 tablet (5mg) every Mon,Wed & Fri / Take 1 1/2 tablets (7.5mg) all other days OR per INR clinic   Patient taking differently: 7.5 mg MWF, 5 mg ROW      Facility-Administered Medications: None     Current Facility-Administered Medications   Medication Dose Route Frequency Provider Last Rate Last Admin    acetaminophen (TYLENOL) tablet 650 mg  650 mg Oral Q4H PRN Tevin Burns MD   650 mg at 11/13/24 1052    Or    acetaminophen (TYLENOL) Suppository 650 mg  650 mg Rectal Q4H PRN Tevin Burns MD        albuterol (PROVENTIL HFA/VENTOLIN HFA) inhaler  2 puff Inhalation Q5 Min PRN Dennis Huerta MD        alum & mag hydroxide-simethicone (MAALOX) suspension 30 mL  30 mL Oral Q4H PRN Tevin Burns MD        aminophylline  mg   mg Intravenous Once PRN Dennis Huerta MD        amoxicillin-clavulanate (AUGMENTIN) 875-125 MG per tablet 1 tablet  1 tablet Oral Daily Tevin Burns MD   1 tablet at 11/13/24 1152    aspirin EC tablet 81 mg  81 mg Oral Daily Tevin Burns MD   81 mg at 11/13/24 1153    caffeine (NO-DOZE) tablet 200 mg  200 mg Oral Once PRN Dennis Huerta MD        caffeine citrate (CAFCIT) injection 60 mg  60 mg Intravenous Once PRN Dennis Huerta MD        diazepam (VALIUM) tablet 5 mg  5 mg Oral Q30 Min PRN Dennis Huerta MD        HOLD: Caffeine containing medications 12 hours prior to the procedure   Does not apply HOLD Tevin Burns MD        HOLD: dipyridamole (PERSANTINE) or aspirin/dipyridamole (AGGRENOX) 48 hours prior to the procedure   Does  not apply HOLD Tevin Burns MD        HOLD: theophylline or aminophylline 12 hours prior to the procedure   Does not apply HOLD Tevin Burns MD        IF patient diabetic - HOLD: ALL ORAL HYPOGLYCEMICS and include: glipizide, glyburide, glimepiride, gliclazide, metformin, any metformin containing medication, on day of the procedure   Does not apply HOLD Tevin Burns MD        mesalamine (ASACOL HD) EC tablet 800 mg  800 mg Oral BID Tevin Burns MD   800 mg at 11/13/24 1153    metoprolol succinate ER (TOPROL XL) 24 hr tablet 75 mg  75 mg Oral Daily Tevin Burns MD   75 mg at 11/13/24 1153    nitroGLYcerin (NITROSTAT) sublingual tablet 0.4 mg  0.4 mg Sublingual Q5 Min PRN Tevin Burns MD        nitroGLYcerin (NITROSTAT) sublingual tablet 0.4 mg  0.4 mg Sublingual Q5 Min PRN Dennis Huerta MD        sodium chloride (PF) 0.9% PF flush 1-10 mL  1-10 mL Intravenous Q10 Min PRN Tevin Burns MD        sodium chloride 0.9% BOLUS 250 mL  250 mL Intravenous Once PRN Dennis Huerta MD        tamsulosin (FLOMAX) capsule 0.4 mg  0.4 mg Oral QPM Tevin Burns MD   0.4 mg at 11/13/24 0051     Current Facility-Administered Medications   Medication Dose Route Frequency Provider Last Rate Last Admin    acetaminophen (TYLENOL) tablet 650 mg  650 mg Oral Q4H PRN Tevin Burns MD   650 mg at 11/13/24 1052    Or    acetaminophen (TYLENOL) Suppository 650 mg  650 mg Rectal Q4H PRN Tevin Burns MD        albuterol (PROVENTIL HFA/VENTOLIN HFA) inhaler  2 puff Inhalation Q5 Min PRN Dennis Huerta MD        alum & mag hydroxide-simethicone (MAALOX) suspension 30 mL  30 mL Oral Q4H PRN Tevin Burns MD        aminophylline  mg   mg Intravenous Once PRN Dennis Huerta MD        amoxicillin-clavulanate (AUGMENTIN) 875-125 MG per tablet 1 tablet  1 tablet Oral Daily Tevin Burns MD   1 tablet at 11/13/24 1152    aspirin EC  tablet 81 mg  81 mg Oral Daily Tevin Burns MD   81 mg at 11/13/24 1153    caffeine (NO-DOZE) tablet 200 mg  200 mg Oral Once PRN Dennis Huerta MD        caffeine citrate (CAFCIT) injection 60 mg  60 mg Intravenous Once PRN Dennis Huerta MD        diazepam (VALIUM) tablet 5 mg  5 mg Oral Q30 Min PRN Dennis Huerta MD        HOLD: Caffeine containing medications 12 hours prior to the procedure   Does not apply HOLD Tevin Burns MD        HOLD: dipyridamole (PERSANTINE) or aspirin/dipyridamole (AGGRENOX) 48 hours prior to the procedure   Does not apply HOLD Tevin Burns MD        HOLD: theophylline or aminophylline 12 hours prior to the procedure   Does not apply HOLD Tevin Burns MD        IF patient diabetic - HOLD: ALL ORAL HYPOGLYCEMICS and include: glipizide, glyburide, glimepiride, gliclazide, metformin, any metformin containing medication, on day of the procedure   Does not apply HOLD Tevin Burns MD        mesalamine (ASACOL HD) EC tablet 800 mg  800 mg Oral BID Tevin Burns MD   800 mg at 11/13/24 1153    metoprolol succinate ER (TOPROL XL) 24 hr tablet 75 mg  75 mg Oral Daily Tevin Burns MD   75 mg at 11/13/24 1153    nitroGLYcerin (NITROSTAT) sublingual tablet 0.4 mg  0.4 mg Sublingual Q5 Min PRN Tevin Burns MD        nitroGLYcerin (NITROSTAT) sublingual tablet 0.4 mg  0.4 mg Sublingual Q5 Min PRN Dennis Huerta MD        sodium chloride (PF) 0.9% PF flush 1-10 mL  1-10 mL Intravenous Q10 Min PRN Tevin Burns MD        sodium chloride 0.9% BOLUS 250 mL  250 mL Intravenous Once PRN Dennis Huerta MD        tamsulosin (FLOMAX) capsule 0.4 mg  0.4 mg Oral QPM Tevin Burns MD   0.4 mg at 11/13/24 0051     Allergies   Allergies   Allergen Reactions    Bees Anaphylaxis       Social History    reports that he quit smoking about 22 years ago. His smoking use included cigarettes. He started smoking about 62 years ago. He  "has a 40.5 pack-year smoking history. He has never used smokeless tobacco. He reports current alcohol use. He reports that he does not use drugs.    Family History   I have reviewed this patient's family history and updated it with pertinent information if needed.  Family History   Problem Relation Age of Onset    No Known Problems Mother     Coronary Artery Disease Father         CABG    Heart Disease Father         Pacemaker    No Known Problems Brother     No Known Problems Sister     No Known Problems Son     Other Cancer Daughter     No Known Problems Daughter     Heart Disease Brother     Other - See Comments Grandchild          Review of Systems   A comprehensive review of system was performed and is negative other than that noted in the HPI or here.     Physical Exam   Vital Signs with Ranges  Temp:  [98.1  F (36.7  C)] 98.1  F (36.7  C)  Pulse:  [60-78] 70  Resp:  [10-28] 19  BP: ()/(55-88) 108/63  SpO2:  [87 %-96 %] 94 %  0 lbs 0 oz  No intake/output data recorded.    Constitutional: Appears stated age, well nourished, NAD.  Neck: Supple. JVD not appreciated.  Respiratory: Non-labored. Lungs CTAB.  Cardiovascular: IRR. Bilateral lower extremities with no edema.   GI: Soft, non-distended, non-tender.  Skin: Warm and dry.   Musculoskeletal/Extremities: Symmetrical movement.  Neurologic: No gross focal deficits. Alert, awake.  Psychiatric: Affect appropriate. Mentation normal.    Data   No lab results found in last 7 days.    Invalid input(s): \"TROPONINIES\"  Recent Labs   Lab 11/13/24  0315 11/12/24  2106   WBC  --  9.6   HGB  --  13.3   MCV  --  88   PLT  --  251   INR 2.33*  --    NA  --  136   POTASSIUM  --  4.2   CHLORIDE  --  102   CO2  --  23   BUN  --  16.3   CR  --  0.90   GFRESTIMATED  --  85   ANIONGAP  --  11   AWILDA  --  9.1   GLC  --  135*   ALBUMIN  --  4.0   PROTTOTAL  --  7.3   BILITOTAL  --  0.3   ALKPHOS  --  60   ALT  --  30   AST  --  36   LIPASE  --  31     Recent Labs   Lab Test " "07/25/24  1047 08/01/23  0917   CHOL 126 122   HDL 28* 31*   LDL 66 57   TRIG 162* 172*     Recent Labs   Lab 11/12/24  2106   WBC 9.6   HGB 13.3   HCT 39.7*   MCV 88        No results for input(s): \"PH\", \"PHV\", \"PO2\", \"PO2V\", \"SAT\", \"PCO2\", \"PCO2V\", \"HCO3\", \"HCO3V\" in the last 168 hours.  No results for input(s): \"NTBNPI\", \"NTBNP\" in the last 168 hours.  No results for input(s): \"DD\" in the last 168 hours.  No results for input(s): \"SED\", \"CRP\" in the last 168 hours.  Recent Labs   Lab 11/12/24  2106        No results for input(s): \"TSH\" in the last 168 hours.  No results for input(s): \"COLOR\", \"APPEARANCE\", \"URINEGLC\", \"URINEBILI\", \"URINEKETONE\", \"SG\", \"UBLD\", \"URINEPH\", \"PROTEIN\", \"UROBILINOGEN\", \"NITRITE\", \"LEUKEST\", \"RBCU\", \"WBCU\" in the last 168 hours.    Imaging:  Recent Results (from the past 48 hours)   NM Lexiscan stress test (nuc card)   Result Value    Target     Baseline Systolic     Baseline Diastolic BP 68    Last Stress Systolic     Last Stress Diastolic BP 59    Baseline HR 72    Max HR  71    Max Predicted HR  52    Rate Pressure Product 8,023.0    BP 43    Left Ventricular EF 44    Narrative      The nuclear stress test is abnormal.    TID is absent.    There is a medium sized area of a moderate degree of nontransmural   infarction in the apical and inferior segment(s) of the left ventricle   associated with a small area of a mild degree of navin-infarct ischemia.    Left ventricular function is mildly reduced.    The left ventricular ejection fraction at rest is 43%.  The left   ventricular ejection fraction at stress is 44%.    A prior study was conducted on 4/18/2019.  This study has no change   when compared with the prior study.         Echo:  No results found for this or any previous visit (from the past 4320 hours).    Clinically Significant Risk Factors Present on Admission                # Drug Induced Coagulation Defect: home medication list includes an " "anticoagulant medication    # Hypertension: Noted on problem list  # Heart failure, NOS: heart failure noted on the problem list and last echo with EF 40-50%         # DMII: A1C = 6.9 % (Ref range: 0.0 - 5.6 %) within past 6 months    # Obesity: Estimated body mass index is 33.85 kg/m  as calculated from the following:    Height as of 11/4/24: 1.651 m (5' 5\").    Weight as of 11/4/24: 92.3 kg (203 lb 6.4 oz).        # Pacemaker present  # History of CABG: noted on surgical history      Cardiomyopathy  Heart Valve Replacement    This note was completed in part using Dragon voice recognition software. Although reviewed after completion, some word and grammatical errors may occur.    "

## 2024-11-13 NOTE — ED PROVIDER NOTES
Emergency Department Note      History of Present Illness     Chief Complaint   Chest Pressure and Abnormal Labs    HPI   Fausto Farr is a 84 year old male, on Warfarin, with a history of paroxysmal atrial fibrillation, hypertension, CAD, diastolic HF, sepsis, and ulcerative colitis status post aortic and mitral valve replacement as well as pacemaker placement who presents from the [facility] for evaluation of chest pain and abnormal labs. The patient reports that he woke up around 0500 today with abdominal cramping and vomited. States that he had four episodes of non bloody emesis and also had five episodes of non bloody diarrhea. Notes that later in the morning, at 0900, he began to have chest tightness that lingered throughout the day but resolved just prior to arrival in the emergency department. He has intermittent shortness of breath. He has also been fatigued for one month. He did not have any chest pain with previous myocardial infarctions. He denies dizziness, fever, lower extremity edema, and palpitations. No known sick exposures.      Independent Historian   None    Review of External Notes   I reviewed the [facility] note from today.   I reviewed the cardiology note from 9/3/24. Notes history of AVR in '06, with repeat mechanical AVR in '13. CABG in '06.   I reviewed the echocardiogram from 11/6/24. EF is 45-50%.    CTA Chest Abdomen Pelvis Aorta w/Contrast Impression Today  1. Postop changes aortic valve repair and repair of the aortic root.  2. Normal caliber thoracic aorta without evidence of dissection or other acute aortic pathology.  3. Postop changes endovascular abdominal aortic aneurysm repair. The aortobiiliac endograft is patent. There is a persistent type II endoleak and slow interval increase in size of the aneurysm which currently measures 7 x 7.6 cm and previously in August 2024 measured 7 x 7.4 cm and 6.8 x 7.2 cm in 2022.  4. 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  5. Cholelithiasis.  6. Findings suggestive of chronic pancreatitis.  7. Radiation seeds in the prostate gland.  8. Mild subpleural fibrosis and mild diffuse emphysematous disease.     Past Medical History     Medical History and Problem List   Anemia  BPH  CAD  Cellulitis  Chronic venous insufficiency   Diastolic HF  Gastric ulcer  Hypertension  Mesenteric artery insufficiency   Mitral valve disease  GAGE  Paroxysmal atrial fibrillation  Prostate cancer  AAA  Sepsis  TIA  Ulcerative colitis  Vitamin D deficiency   Right bundle brach block     Medications   Ezetimibe  Metoprolol  Rosuvastatin  Tamsulosin  Warfarin     Surgical History   AVR  Knee arthroplasty, right  CABG  Cardiac catheterization  AAA repair   Atrial flutter ablation  Pacemaker placement  Skin graft  I&D, right knee   Mitral valve repair    Physical Exam     Patient Vitals for the past 24 hrs:   BP Temp Temp src Pulse Resp SpO2   11/12/24 2303 110/67 -- -- 69 12 --   11/12/24 2243 -- -- -- 68 11 --   11/12/24 2233 115/66 -- -- 69 -- --   11/12/24 2203 125/71 -- -- 68 13 --   11/12/24 2135 112/71 -- -- 69 -- 95 %   11/12/24 2125 -- -- -- 71 10 95 %   11/12/24 2105 108/88 -- -- 72 -- 95 %   11/12/24 2047 134/60 98.1  F (36.7  C) Oral 72 18 96 %     Physical Exam  GENERAL: Awake, alert  CARDIOVASCULAR: Regular rate and rhythm  LUNGS: Clear bilaterally, no wheezes rales or rhonchi  ABDOMEN: Soft, nontender, no rebound or guarding  EXTREMITIES: No peripheral edema  NEURO: Awake and alert, moves all extremities    Diagnostics     Lab Results   Labs Ordered and Resulted from Time of ED Arrival to Time of ED Departure   BASIC METABOLIC PANEL - Abnormal       Result Value    Sodium 136      Potassium 4.2      Chloride 102      Carbon Dioxide (CO2) 23      Anion Gap 11      Urea Nitrogen 16.3      Creatinine 0.90      GFR Estimate 85      Calcium 9.1      Glucose 135 (*)    CBC WITH PLATELETS AND  DIFFERENTIAL - Abnormal    WBC Count 9.6      RBC Count 4.52      Hemoglobin 13.3      Hematocrit 39.7 (*)     MCV 88      MCH 29.4      MCHC 33.5      RDW 13.1      Platelet Count 251      % Neutrophils 86      % Lymphocytes 7      % Monocytes 6      % Eosinophils 1      % Basophils 0      % Immature Granulocytes 0      NRBCs per 100 WBC 0      Absolute Neutrophils 8.3      Absolute Lymphocytes 0.6 (*)     Absolute Monocytes 0.6      Absolute Eosinophils 0.1      Absolute Basophils 0.0      Absolute Immature Granulocytes 0.0      Absolute NRBCs 0.0     TROPONIN T, HIGH SENSITIVITY - Abnormal    Troponin T, High Sensitivity 38 (*)    TROPONIN T, HIGH SENSITIVITY - Abnormal    Troponin T, High Sensitivity 35 (*)    HEPATIC FUNCTION PANEL - Normal    Protein Total 7.3      Albumin 4.0      Bilirubin Total 0.3      Alkaline Phosphatase 60      AST 36      ALT 30      Bilirubin Direct <0.20     LIPASE - Normal    Lipase 31         Imaging   No orders to display       EKG   ECG taken at 2033, ECG read at 2034  Ventricular-paced rhythm with frequent premature ventricular complexes    No changes as compared to prior, dated 5/24/24.  Rate 74 bpm. WY interval * ms. QRS duration 158 ms. QT/QTc 428/475 ms. P-R-T axes * -56 28.    Independent Interpretation   None    ED Course      Medications Administered   Medications   tamsulosin (FLOMAX) capsule 0.4 mg (0.4 mg Oral $Given 11/13/24 0051)   mesalamine (ASACOL HD) EC tablet 800 mg (800 mg Oral $Given 11/13/24 0051)   aspirin (ASA) chewable tablet 324 mg (324 mg Oral $Given 11/12/24 2147)       Procedures   Procedures     Discussion of Management   Admitting Hospitalist, Grant    ED Course        Additional Documentation  None    Medical Decision Making / Diagnosis     CMS Diagnoses: None    MIPS       None    [facility]   Fausto Farr is a 84 year old male with history of CABG who presents emergency department for evaluation of chest pain that is since resolved.  Also had  some vomiting diarrhea.  Abdomen is benign here.  EKG showed a paced rhythm.  Troponin was elevated at 38, delta Trop was 35, his chest pain has resolved, no indication for emergent cardiac catheterization at this time.  Symptoms are somewhat atypical with GI symptoms, however patient does have significant cardiac history, and given elevated troponins, we will admit the patient to observation for further monitoring and possible stress test.  CTA of the chest abdomen pelvis was also reviewed from the emergency room which showed some chronic changes but no acute findings    Disposition   The patient was admitted to the hospital.     Diagnosis     ICD-10-CM    1. Chest pain, unspecified type  R07.9            Scribe Disclosure:  I, Christina Campbell, am serving as a scribe at 9:36 PM on 11/12/2024 to document services personally performed by Park Lopez MD based on my observations and the provider's statements to me.        Park Lopez MD  11/13/24 0057

## 2024-11-13 NOTE — ED NOTES
[facility]  ED Nurse Handoff Report    ED Chief complaint: Chest Pressure and Abnormal Labs      ED Diagnosis:   Final diagnoses:   None       Code Status: Provider to discuss with patient    Allergies:   Allergies   Allergen Reactions    Bees Anaphylaxis       Patient Story: ALY from [facility] where he presented with Chest Pressure for the past 2 days, accompanied by wife. Labs had elevated Trops. VSS. Patient has a PPM   Focused Assessment:  Alert and oriented x4, vitals stable on room air. He denies chest pain     Treatments and/or interventions provided:   Results for orders placed or performed during the hospital encounter of 11/12/24   Basic metabolic panel     Status: Abnormal   Result Value Ref Range    Sodium 136 135 - 145 mmol/L    Potassium 4.2 3.4 - 5.3 mmol/L    Chloride 102 98 - 107 mmol/L    Carbon Dioxide (CO2) 23 22 - 29 mmol/L    Anion Gap 11 7 - 15 mmol/L    Urea Nitrogen 16.3 8.0 - 23.0 mg/dL    Creatinine 0.90 0.67 - 1.17 mg/dL    GFR Estimate 85 >60 mL/min/1.73m2    Calcium 9.1 8.8 - 10.4 mg/dL    Glucose 135 (H) 70 - 99 mg/dL   Hepatic panel     Status: Normal   Result Value Ref Range    Protein Total 7.3 6.4 - 8.3 g/dL    Albumin 4.0 3.5 - 5.2 g/dL    Bilirubin Total 0.3 <=1.2 mg/dL    Alkaline Phosphatase 60 40 - 150 U/L    AST 36 0 - 45 U/L    ALT 30 0 - 70 U/L    Bilirubin Direct <0.20 0.00 - 0.30 mg/dL   Lipase     Status: Normal   Result Value Ref Range    Lipase 31 13 - 60 U/L   CBC with platelets and differential     Status: Abnormal   Result Value Ref Range    WBC Count 9.6 4.0 - 11.0 10e3/uL    RBC Count 4.52 4.40 - 5.90 10e6/uL    Hemoglobin 13.3 13.3 - 17.7 g/dL    Hematocrit 39.7 (L) 40.0 - 53.0 %    MCV 88 78 - 100 fL    MCH 29.4 26.5 - 33.0 pg    MCHC 33.5 31.5 - 36.5 g/dL    RDW 13.1 10.0 - 15.0 %    Platelet Count 251 150 - 450 10e3/uL    % Neutrophils 86 %    % Lymphocytes 7 %    % Monocytes 6 %    % Eosinophils 1 %    % Basophils 0 %    % Immature  Granulocytes 0 %    NRBCs per 100 WBC 0 <1 /100    Absolute Neutrophils 8.3 1.6 - 8.3 10e3/uL    Absolute Lymphocytes 0.6 (L) 0.8 - 5.3 10e3/uL    Absolute Monocytes 0.6 0.0 - 1.3 10e3/uL    Absolute Eosinophils 0.1 0.0 - 0.7 10e3/uL    Absolute Basophils 0.0 0.0 - 0.2 10e3/uL    Absolute Immature Granulocytes 0.0 <=0.4 10e3/uL    Absolute NRBCs 0.0 10e3/uL   Troponin T, High Sensitivity     Status: Abnormal   Result Value Ref Range    Troponin T, High Sensitivity 38 (H) <=22 ng/L   CBC with platelets + differential     Status: Abnormal    Narrative    The following orders were created for panel order CBC with platelets + differential.  Procedure                               Abnormality         Status                     ---------                               -----------         ------                     CBC with platelets and d...[[id number]]  Abnormal            Final result                 Please view results for these tests on the individual orders.     Medications   aspirin (ASA) chewable tablet 324 mg (324 mg Oral $Given 11/12/24 2147)       Patient's response to treatments and/or interventions: Tolerated    To be done/followed up on inpatient unit: Per inpatient provider orders    Does this patient have any cognitive concerns?:  None    Activity level - Baseline/Home:  Independent  Activity Level - Current:   Independent    Patient's Preferred language: English   Needed?: No    Isolation: Contact   Infection: MRSA  Patient tested for COVID 19 prior to admission: NO  Bariatric?: No    Vital Signs:   Vitals:    11/12/24 2047 11/12/24 2105 11/12/24 2125 11/12/24 2135   BP: 134/60 108/88  112/71   Pulse: 72 72 71 69   Resp: 18  10    Temp: 98.1  F (36.7  C)      TempSrc: Oral      SpO2: 96% 95% 95% 95%       Cardiac Rhythm:Cardiac Rhythm: Ventricular paced    Was the PSS-3 completed:   Yes  What interventions are required if any?               Family Comments: None.   OBS brochure/video  discussed/provided to patient/family: No              Name of person given brochure if not patient:               Relationship to patient:     For the majority of the shift this patient's behavior was Green.   Behavioral interventions performed were None.    ED NURSE PHONE NUMBER: [phone]

## 2024-11-14 ENCOUNTER — RESULTS ONLY (OUTPATIENT)
Dept: ADMINISTRATIVE | Facility: CLINIC | Age: 84
End: 2024-11-14

## 2024-11-14 PROBLEM — R94.39 ABNORMAL STRESS TEST: Status: ACTIVE | Noted: 2024-11-14

## 2024-11-14 PROBLEM — J32.9 SINUSITIS, UNSPECIFIED CHRONICITY, UNSPECIFIED LOCATION: Status: ACTIVE | Noted: 2024-11-14

## 2024-11-14 PROBLEM — R07.9 CHEST PAIN, UNSPECIFIED TYPE: Status: ACTIVE | Noted: 2024-11-14

## 2024-11-14 LAB
ACT BLD: 282 SECONDS (ref 74–150)
ANION GAP SERPL CALCULATED.3IONS-SCNC: 8 MMOL/L (ref 7–15)
ATRIAL RATE - MUSE: 0 BPM
BUN SERPL-MCNC: 15.6 MG/DL (ref 8–23)
CALCIUM SERPL-MCNC: 8.8 MG/DL (ref 8.8–10.4)
CHLORIDE SERPL-SCNC: 103 MMOL/L (ref 98–107)
CREAT SERPL-MCNC: 0.96 MG/DL (ref 0.67–1.17)
DIASTOLIC BLOOD PRESSURE - MUSE: NORMAL MMHG
EGFRCR SERPLBLD CKD-EPI 2021: 78 ML/MIN/1.73M2
ERYTHROCYTE [DISTWIDTH] IN BLOOD BY AUTOMATED COUNT: 13.2 % (ref 10–15)
GLUCOSE SERPL-MCNC: 108 MG/DL (ref 70–99)
HCO3 SERPL-SCNC: 27 MMOL/L (ref 22–29)
HCT VFR BLD AUTO: 38.4 % (ref 40–53)
HGB BLD-MCNC: 12.7 G/DL (ref 13.3–17.7)
INR PPP: 1.82 (ref 0.85–1.15)
INTERPRETATION ECG - MUSE: NORMAL
MCH RBC QN AUTO: 29.3 PG (ref 26.5–33)
MCHC RBC AUTO-ENTMCNC: 33.1 G/DL (ref 31.5–36.5)
MCV RBC AUTO: 89 FL (ref 78–100)
MDC_IDC_LEAD_CONNECTION_STATUS: NORMAL
MDC_IDC_LEAD_CONNECTION_STATUS: NORMAL
MDC_IDC_LEAD_IMPLANT_DT: NORMAL
MDC_IDC_LEAD_IMPLANT_DT: NORMAL
MDC_IDC_LEAD_LOCATION: NORMAL
MDC_IDC_LEAD_LOCATION: NORMAL
MDC_IDC_LEAD_LOCATION_DETAIL_1: NORMAL
MDC_IDC_LEAD_LOCATION_DETAIL_1: NORMAL
MDC_IDC_LEAD_MFG: NORMAL
MDC_IDC_LEAD_MFG: NORMAL
MDC_IDC_LEAD_MODEL: NORMAL
MDC_IDC_LEAD_MODEL: NORMAL
MDC_IDC_LEAD_POLARITY_TYPE: NORMAL
MDC_IDC_LEAD_POLARITY_TYPE: NORMAL
MDC_IDC_LEAD_SERIAL: NORMAL
MDC_IDC_LEAD_SERIAL: NORMAL
MDC_IDC_MSMT_BATTERY_REMAINING_LONGEVITY: 113 MO
MDC_IDC_MSMT_BATTERY_REMAINING_PERCENTAGE: 100 %
MDC_IDC_MSMT_BATTERY_STATUS: NORMAL
MDC_IDC_MSMT_LEADCHNL_RA_IMPEDANCE_VALUE: 708 OHM
MDC_IDC_MSMT_LEADCHNL_RA_PACING_THRESHOLD_AMPLITUDE: 0.7 V
MDC_IDC_MSMT_LEADCHNL_RA_PACING_THRESHOLD_PULSEWIDTH: 0.4 MS
MDC_IDC_MSMT_LEADCHNL_RA_SENSING_INTR_AMPL: 7.7 MV
MDC_IDC_MSMT_LEADCHNL_RV_IMPEDANCE_VALUE: 582 OHM
MDC_IDC_MSMT_LEADCHNL_RV_PACING_THRESHOLD_AMPLITUDE: 0.8 V
MDC_IDC_MSMT_LEADCHNL_RV_PACING_THRESHOLD_PULSEWIDTH: 0.4 MS
MDC_IDC_MSMT_LEADCHNL_RV_SENSING_INTR_AMPL: 25 MV
MDC_IDC_PG_IMPLANT_DTM: NORMAL
MDC_IDC_PG_MFG: NORMAL
MDC_IDC_PG_MODEL: NORMAL
MDC_IDC_PG_SERIAL: NORMAL
MDC_IDC_PG_TYPE: NORMAL
MDC_IDC_SESS_CLINIC_NAME: NORMAL
MDC_IDC_SESS_DTM: NORMAL
MDC_IDC_SESS_TYPE: NORMAL
MDC_IDC_SET_BRADY_AT_MODE_SWITCH_MODE: NORMAL
MDC_IDC_SET_BRADY_AT_MODE_SWITCH_RATE: 170 {BEATS}/MIN
MDC_IDC_SET_BRADY_HYSTRATE: NORMAL
MDC_IDC_SET_BRADY_LOWRATE: 60 {BEATS}/MIN
MDC_IDC_SET_BRADY_MAX_SENSOR_RATE: 130 {BEATS}/MIN
MDC_IDC_SET_BRADY_MAX_TRACKING_RATE: 130 {BEATS}/MIN
MDC_IDC_SET_BRADY_MODE: NORMAL
MDC_IDC_SET_BRADY_PAV_DELAY_HIGH: 200 MS
MDC_IDC_SET_BRADY_PAV_DELAY_LOW: 250 MS
MDC_IDC_SET_BRADY_SAV_DELAY_HIGH: 200 MS
MDC_IDC_SET_BRADY_SAV_DELAY_LOW: 250 MS
MDC_IDC_SET_LEADCHNL_RA_PACING_AMPLITUDE: 5 V
MDC_IDC_SET_LEADCHNL_RA_PACING_CAPTURE_MODE: NORMAL
MDC_IDC_SET_LEADCHNL_RA_PACING_POLARITY: NORMAL
MDC_IDC_SET_LEADCHNL_RA_PACING_PULSEWIDTH: 0.4 MS
MDC_IDC_SET_LEADCHNL_RA_SENSING_ADAPTATION_MODE: NORMAL
MDC_IDC_SET_LEADCHNL_RA_SENSING_POLARITY: NORMAL
MDC_IDC_SET_LEADCHNL_RA_SENSING_SENSITIVITY: 0.25 MV
MDC_IDC_SET_LEADCHNL_RV_PACING_AMPLITUDE: 1.4 V
MDC_IDC_SET_LEADCHNL_RV_PACING_CAPTURE_MODE: NORMAL
MDC_IDC_SET_LEADCHNL_RV_PACING_POLARITY: NORMAL
MDC_IDC_SET_LEADCHNL_RV_PACING_PULSEWIDTH: 0.4 MS
MDC_IDC_SET_LEADCHNL_RV_SENSING_ADAPTATION_MODE: NORMAL
MDC_IDC_SET_LEADCHNL_RV_SENSING_POLARITY: NORMAL
MDC_IDC_SET_LEADCHNL_RV_SENSING_SENSITIVITY: 0.6 MV
MDC_IDC_SET_ZONE_DETECTION_INTERVAL: 375 MS
MDC_IDC_SET_ZONE_STATUS: NORMAL
MDC_IDC_SET_ZONE_TYPE: NORMAL
MDC_IDC_SET_ZONE_VENDOR_TYPE: NORMAL
MDC_IDC_STAT_BRADY_DTM_END: NORMAL
MDC_IDC_STAT_BRADY_DTM_START: NORMAL
MDC_IDC_STAT_BRADY_RA_PERCENT_PACED: 1 %
MDC_IDC_STAT_BRADY_RV_PERCENT_PACED: 90 %
MDC_IDC_STAT_EPISODE_RECENT_COUNT: 10
MDC_IDC_STAT_EPISODE_RECENT_COUNT_DTM_END: NORMAL
MDC_IDC_STAT_EPISODE_RECENT_COUNT_DTM_START: NORMAL
MDC_IDC_STAT_EPISODE_TOTAL_COUNT: 40
MDC_IDC_STAT_EPISODE_TOTAL_COUNT_DTM_END: NORMAL
MDC_IDC_STAT_EPISODE_TYPE: NORMAL
MDC_IDC_STAT_EPISODE_TYPE: NORMAL
MDC_IDC_STAT_EPISODE_VENDOR_TYPE: NORMAL
MDC_IDC_STAT_EPISODE_VENDOR_TYPE: NORMAL
P AXIS - MUSE: NORMAL DEGREES
PLATELET # BLD AUTO: 248 10E3/UL (ref 150–450)
POTASSIUM SERPL-SCNC: 4.5 MMOL/L (ref 3.4–5.3)
PR INTERVAL - MUSE: NORMAL MS
QRS DURATION - MUSE: 0 MS
QT - MUSE: 0 MS
QTC - MUSE: 0 MS
R AXIS - MUSE: 0 DEGREES
RBC # BLD AUTO: 4.33 10E6/UL (ref 4.4–5.9)
SODIUM SERPL-SCNC: 138 MMOL/L (ref 135–145)
SYSTOLIC BLOOD PRESSURE - MUSE: NORMAL MMHG
T AXIS - MUSE: 0 DEGREES
UFH PPP CHRO-ACNC: 0.46 IU/ML
UFH PPP CHRO-ACNC: 0.5 IU/ML
VENTRICULAR RATE- MUSE: 0 BPM
WBC # BLD AUTO: 6.6 10E3/UL (ref 4–11)

## 2024-11-14 PROCEDURE — 120N000001 HC R&B MED SURG/OB

## 2024-11-14 PROCEDURE — 36415 COLL VENOUS BLD VENIPUNCTURE: CPT | Performed by: STUDENT IN AN ORGANIZED HEALTH CARE EDUCATION/TRAINING PROGRAM

## 2024-11-14 PROCEDURE — B240ZZ3 ULTRASONOGRAPHY OF SINGLE CORONARY ARTERY, INTRAVASCULAR: ICD-10-PCS | Performed by: INTERNAL MEDICINE

## 2024-11-14 PROCEDURE — 80061 LIPID PANEL: CPT | Performed by: STUDENT IN AN ORGANIZED HEALTH CARE EDUCATION/TRAINING PROGRAM

## 2024-11-14 PROCEDURE — 272N000001 HC OR GENERAL SUPPLY STERILE: Performed by: INTERNAL MEDICINE

## 2024-11-14 PROCEDURE — 93571 IV DOP VEL&/PRESS C FLO 1ST: CPT | Mod: 26 | Performed by: INTERNAL MEDICINE

## 2024-11-14 PROCEDURE — 92928 PRQ TCAT PLMT NTRAC ST 1 LES: CPT | Mod: LD | Performed by: INTERNAL MEDICINE

## 2024-11-14 PROCEDURE — B2111ZZ FLUOROSCOPY OF MULTIPLE CORONARY ARTERIES USING LOW OSMOLAR CONTRAST: ICD-10-PCS | Performed by: INTERNAL MEDICINE

## 2024-11-14 PROCEDURE — 93455 CORONARY ART/GRFT ANGIO S&I: CPT | Mod: 26 | Performed by: INTERNAL MEDICINE

## 2024-11-14 PROCEDURE — 36415 COLL VENOUS BLD VENIPUNCTURE: CPT | Performed by: INTERNAL MEDICINE

## 2024-11-14 PROCEDURE — 99152 MOD SED SAME PHYS/QHP 5/>YRS: CPT | Performed by: INTERNAL MEDICINE

## 2024-11-14 PROCEDURE — 85520 HEPARIN ASSAY: CPT | Performed by: INTERNAL MEDICINE

## 2024-11-14 PROCEDURE — 82465 ASSAY BLD/SERUM CHOLESTEROL: CPT | Performed by: STUDENT IN AN ORGANIZED HEALTH CARE EDUCATION/TRAINING PROGRAM

## 2024-11-14 PROCEDURE — 99233 SBSQ HOSP IP/OBS HIGH 50: CPT | Mod: 25

## 2024-11-14 PROCEDURE — C1874 STENT, COATED/COV W/DEL SYS: HCPCS | Performed by: INTERNAL MEDICINE

## 2024-11-14 PROCEDURE — 92978 ENDOLUMINL IVUS OCT C 1ST: CPT | Mod: 26 | Performed by: INTERNAL MEDICINE

## 2024-11-14 PROCEDURE — 250N000011 HC RX IP 250 OP 636: Performed by: INTERNAL MEDICINE

## 2024-11-14 PROCEDURE — 250N000011 HC RX IP 250 OP 636

## 2024-11-14 PROCEDURE — 250N000013 HC RX MED GY IP 250 OP 250 PS 637

## 2024-11-14 PROCEDURE — C1725 CATH, TRANSLUMIN NON-LASER: HCPCS | Performed by: INTERNAL MEDICINE

## 2024-11-14 PROCEDURE — 027035Z DILATION OF CORONARY ARTERY, ONE ARTERY WITH TWO DRUG-ELUTING INTRALUMINAL DEVICES, PERCUTANEOUS APPROACH: ICD-10-PCS | Performed by: INTERNAL MEDICINE

## 2024-11-14 PROCEDURE — 92978 ENDOLUMINL IVUS OCT C 1ST: CPT | Mod: LD

## 2024-11-14 PROCEDURE — 36415 COLL VENOUS BLD VENIPUNCTURE: CPT | Performed by: PHYSICIAN ASSISTANT

## 2024-11-14 PROCEDURE — C1769 GUIDE WIRE: HCPCS | Performed by: INTERNAL MEDICINE

## 2024-11-14 PROCEDURE — G0378 HOSPITAL OBSERVATION PER HR: HCPCS

## 2024-11-14 PROCEDURE — 250N000013 HC RX MED GY IP 250 OP 250 PS 637: Performed by: INTERNAL MEDICINE

## 2024-11-14 PROCEDURE — 93799 UNLISTED CV SVC/PROCEDURE: CPT | Performed by: INTERNAL MEDICINE

## 2024-11-14 PROCEDURE — 85610 PROTHROMBIN TIME: CPT | Performed by: PHYSICIAN ASSISTANT

## 2024-11-14 PROCEDURE — C1753 CATH, INTRAVAS ULTRASOUND: HCPCS | Performed by: INTERNAL MEDICINE

## 2024-11-14 PROCEDURE — 80048 BASIC METABOLIC PNL TOTAL CA: CPT | Performed by: PHYSICIAN ASSISTANT

## 2024-11-14 PROCEDURE — 4A0335C MEASUREMENT OF ARTERIAL FLOW, CORONARY, PERCUTANEOUS APPROACH: ICD-10-PCS | Performed by: INTERNAL MEDICINE

## 2024-11-14 PROCEDURE — 85347 COAGULATION TIME ACTIVATED: CPT

## 2024-11-14 PROCEDURE — 250N000009 HC RX 250: Performed by: INTERNAL MEDICINE

## 2024-11-14 PROCEDURE — 99232 SBSQ HOSP IP/OBS MODERATE 35: CPT | Performed by: PHYSICIAN ASSISTANT

## 2024-11-14 PROCEDURE — 258N000003 HC RX IP 258 OP 636: Performed by: STUDENT IN AN ORGANIZED HEALTH CARE EDUCATION/TRAINING PROGRAM

## 2024-11-14 PROCEDURE — 93455 CORONARY ART/GRFT ANGIO S&I: CPT | Performed by: INTERNAL MEDICINE

## 2024-11-14 PROCEDURE — B2121ZZ FLUOROSCOPY OF SINGLE CORONARY ARTERY BYPASS GRAFT USING LOW OSMOLAR CONTRAST: ICD-10-PCS | Performed by: INTERNAL MEDICINE

## 2024-11-14 PROCEDURE — C1894 INTRO/SHEATH, NON-LASER: HCPCS | Performed by: INTERNAL MEDICINE

## 2024-11-14 PROCEDURE — C1760 CLOSURE DEV, VASC: HCPCS | Performed by: INTERNAL MEDICINE

## 2024-11-14 PROCEDURE — C1887 CATHETER, GUIDING: HCPCS | Performed by: INTERNAL MEDICINE

## 2024-11-14 PROCEDURE — C9600 PERC DRUG-EL COR STENT SING: HCPCS | Performed by: INTERNAL MEDICINE

## 2024-11-14 PROCEDURE — 99153 MOD SED SAME PHYS/QHP EA: CPT | Performed by: INTERNAL MEDICINE

## 2024-11-14 PROCEDURE — 93010 ELECTROCARDIOGRAM REPORT: CPT | Performed by: INTERNAL MEDICINE

## 2024-11-14 PROCEDURE — 93005 ELECTROCARDIOGRAM TRACING: CPT

## 2024-11-14 PROCEDURE — 82310 ASSAY OF CALCIUM: CPT | Performed by: PHYSICIAN ASSISTANT

## 2024-11-14 PROCEDURE — 85027 COMPLETE CBC AUTOMATED: CPT | Performed by: PHYSICIAN ASSISTANT

## 2024-11-14 DEVICE — STENT CORONARY DES SYNERGY XD MR US 3.00X38MM H7493941838300: Type: IMPLANTABLE DEVICE | Status: FUNCTIONAL

## 2024-11-14 DEVICE — STENT CORONARY DES SYNERGY XD MR US 4.00X28MM H7493941828400: Type: IMPLANTABLE DEVICE | Status: FUNCTIONAL

## 2024-11-14 DEVICE — CLOSURE ANGIOSEAL 6FR 610130: Type: IMPLANTABLE DEVICE | Status: FUNCTIONAL

## 2024-11-14 RX ORDER — ASPIRIN 81 MG/1
243 TABLET, CHEWABLE ORAL ONCE
Status: COMPLETED | OUTPATIENT
Start: 2024-11-14 | End: 2024-11-14

## 2024-11-14 RX ORDER — NALOXONE HYDROCHLORIDE 0.4 MG/ML
0.4 INJECTION, SOLUTION INTRAMUSCULAR; INTRAVENOUS; SUBCUTANEOUS
Status: ACTIVE | OUTPATIENT
Start: 2024-11-14 | End: 2024-11-15

## 2024-11-14 RX ORDER — ASPIRIN 325 MG
325 TABLET ORAL ONCE
Status: COMPLETED | OUTPATIENT
Start: 2024-11-14 | End: 2024-11-14

## 2024-11-14 RX ORDER — OXYCODONE HYDROCHLORIDE 5 MG/1
5 TABLET ORAL EVERY 4 HOURS PRN
Status: DISCONTINUED | OUTPATIENT
Start: 2024-11-14 | End: 2024-11-16 | Stop reason: HOSPADM

## 2024-11-14 RX ORDER — NALOXONE HYDROCHLORIDE 0.4 MG/ML
0.2 INJECTION, SOLUTION INTRAMUSCULAR; INTRAVENOUS; SUBCUTANEOUS
Status: ACTIVE | OUTPATIENT
Start: 2024-11-14 | End: 2024-11-15

## 2024-11-14 RX ORDER — CLOPIDOGREL BISULFATE 75 MG/1
75 TABLET ORAL DAILY
Qty: 90 TABLET | Refills: 3 | Status: SHIPPED | OUTPATIENT
Start: 2024-11-15 | End: 2024-11-16

## 2024-11-14 RX ORDER — SODIUM CHLORIDE 9 MG/ML
INJECTION, SOLUTION INTRAVENOUS CONTINUOUS
Status: ACTIVE | OUTPATIENT
Start: 2024-11-14 | End: 2024-11-14

## 2024-11-14 RX ORDER — IOPAMIDOL 755 MG/ML
INJECTION, SOLUTION INTRAVASCULAR
Status: DISCONTINUED | OUTPATIENT
Start: 2024-11-14 | End: 2024-11-14 | Stop reason: HOSPADM

## 2024-11-14 RX ORDER — METOPROLOL TARTRATE 1 MG/ML
5 INJECTION, SOLUTION INTRAVENOUS
Status: DISCONTINUED | OUTPATIENT
Start: 2024-11-14 | End: 2024-11-16 | Stop reason: HOSPADM

## 2024-11-14 RX ORDER — NITROGLYCERIN 5 MG/ML
VIAL (ML) INTRAVENOUS
Status: DISCONTINUED | OUTPATIENT
Start: 2024-11-14 | End: 2024-11-14 | Stop reason: HOSPADM

## 2024-11-14 RX ORDER — CLOPIDOGREL BISULFATE 75 MG/1
75 TABLET ORAL DAILY
Status: DISCONTINUED | OUTPATIENT
Start: 2024-11-15 | End: 2024-11-16 | Stop reason: HOSPADM

## 2024-11-14 RX ORDER — ONDANSETRON 4 MG/1
4 TABLET, ORALLY DISINTEGRATING ORAL EVERY 6 HOURS PRN
Status: DISCONTINUED | OUTPATIENT
Start: 2024-11-14 | End: 2024-11-16 | Stop reason: HOSPADM

## 2024-11-14 RX ORDER — CLOPIDOGREL BISULFATE 75 MG/1
TABLET ORAL
Status: DISCONTINUED | OUTPATIENT
Start: 2024-11-14 | End: 2024-11-14 | Stop reason: HOSPADM

## 2024-11-14 RX ORDER — FENTANYL CITRATE 50 UG/ML
INJECTION, SOLUTION INTRAMUSCULAR; INTRAVENOUS
Status: DISCONTINUED | OUTPATIENT
Start: 2024-11-14 | End: 2024-11-14 | Stop reason: HOSPADM

## 2024-11-14 RX ORDER — HYDRALAZINE HYDROCHLORIDE 20 MG/ML
10 INJECTION INTRAMUSCULAR; INTRAVENOUS EVERY 4 HOURS PRN
Status: DISCONTINUED | OUTPATIENT
Start: 2024-11-14 | End: 2024-11-16 | Stop reason: HOSPADM

## 2024-11-14 RX ORDER — FLUMAZENIL 0.1 MG/ML
0.2 INJECTION, SOLUTION INTRAVENOUS
Status: ACTIVE | OUTPATIENT
Start: 2024-11-14 | End: 2024-11-15

## 2024-11-14 RX ORDER — ASPIRIN 81 MG/1
81 TABLET, CHEWABLE ORAL ONCE
Status: DISCONTINUED | OUTPATIENT
Start: 2024-11-14 | End: 2024-11-14

## 2024-11-14 RX ORDER — HEPARIN SODIUM 1000 [USP'U]/ML
INJECTION, SOLUTION INTRAVENOUS; SUBCUTANEOUS
Status: DISCONTINUED | OUTPATIENT
Start: 2024-11-14 | End: 2024-11-14 | Stop reason: HOSPADM

## 2024-11-14 RX ORDER — OXYCODONE HYDROCHLORIDE 5 MG/1
10 TABLET ORAL EVERY 4 HOURS PRN
Status: DISCONTINUED | OUTPATIENT
Start: 2024-11-14 | End: 2024-11-16 | Stop reason: HOSPADM

## 2024-11-14 RX ORDER — ASPIRIN 81 MG/1
81 TABLET ORAL DAILY
Status: DISCONTINUED | OUTPATIENT
Start: 2024-11-15 | End: 2024-11-16 | Stop reason: HOSPADM

## 2024-11-14 RX ORDER — NITROGLYCERIN 0.4 MG/1
0.4 TABLET SUBLINGUAL EVERY 5 MIN PRN
Status: DISCONTINUED | OUTPATIENT
Start: 2024-11-14 | End: 2024-11-16 | Stop reason: HOSPADM

## 2024-11-14 RX ORDER — ACETAMINOPHEN 325 MG/1
650 TABLET ORAL EVERY 4 HOURS PRN
Status: DISCONTINUED | OUTPATIENT
Start: 2024-11-14 | End: 2024-11-14

## 2024-11-14 RX ORDER — ATROPINE SULFATE 0.1 MG/ML
0.5 INJECTION INTRAVENOUS
Status: ACTIVE | OUTPATIENT
Start: 2024-11-14 | End: 2024-11-15

## 2024-11-14 RX ORDER — ONDANSETRON 2 MG/ML
4 INJECTION INTRAMUSCULAR; INTRAVENOUS EVERY 6 HOURS PRN
Status: DISCONTINUED | OUTPATIENT
Start: 2024-11-14 | End: 2024-11-16 | Stop reason: HOSPADM

## 2024-11-14 RX ORDER — FENTANYL CITRATE 50 UG/ML
25 INJECTION, SOLUTION INTRAMUSCULAR; INTRAVENOUS
Status: DISCONTINUED | OUTPATIENT
Start: 2024-11-14 | End: 2024-11-16 | Stop reason: HOSPADM

## 2024-11-14 RX ADMIN — SODIUM CHLORIDE: 900 INJECTION, SOLUTION INTRAVENOUS at 18:30

## 2024-11-14 RX ADMIN — HEPARIN SODIUM 1250 UNITS/HR: 10000 INJECTION, SOLUTION INTRAVENOUS at 18:29

## 2024-11-14 RX ADMIN — MESALAMINE 800 MG: 800 TABLET, DELAYED RELEASE ORAL at 09:11

## 2024-11-14 RX ADMIN — TAMSULOSIN HYDROCHLORIDE 0.4 MG: 0.4 CAPSULE ORAL at 20:29

## 2024-11-14 RX ADMIN — HEPARIN SODIUM 1250 UNITS/HR: 10000 INJECTION, SOLUTION INTRAVENOUS at 13:16

## 2024-11-14 RX ADMIN — ASPIRIN 81 MG: 81 TABLET, COATED ORAL at 07:54

## 2024-11-14 RX ADMIN — METOPROLOL SUCCINATE 75 MG: 50 TABLET, EXTENDED RELEASE ORAL at 07:54

## 2024-11-14 RX ADMIN — AMOXICILLIN AND CLAVULANATE POTASSIUM 1 TABLET: 875; 125 TABLET, FILM COATED ORAL at 07:55

## 2024-11-14 RX ADMIN — ASPIRIN 81 MG CHEWABLE TABLET 243 MG: 81 TABLET CHEWABLE at 07:46

## 2024-11-14 RX ADMIN — MESALAMINE 800 MG: 800 TABLET, DELAYED RELEASE ORAL at 20:28

## 2024-11-14 NOTE — PROGRESS NOTES
[facility]    ~Cardiology Progress Note~    Primary Cardiologist: Dr. Santo/Dr. Vasquez    Date of Admission: 11/12/2024  Service Date: 11/14/2024    Summary:  Mr. Fausto Farr is a very pleasant 84 year old male with a past medical history of valvular heart disease (AVR in 2006 with subsequent perivalvular leak and redo mechanical AVR and mechanical MVR in 2013; on warfarin), coronary artery disease (CABG x2 LIMA-LAD and radial graft to RCA in 2006), chronic diastolic heart failure, previous endovascular AAA repair, complete AV block (status post PPM in 2022), obstructive sleep apnea (CPAP compliant), hypertension, dyslipidemia, history of typical atrial flutter (successful ablation in 2019) varicose veins with venous insufficiency (treatment with VenaSeal closure, sclerotherapy and phlebectomy in 2019), and asymptomatic NSVT who was evaluated in urgent care for nausea, diarrhea and chest pain.  Troponin was elevated and recommendation was to seek evaluation in the emergency department. He was admitted on 11/12/2014 .  Cardiology was asked to consult on this patient for chest pain and an abnormal stress test.     Interval November 14, 2024:  Denies chest pain and dyspnea.  INR 1.82. Cr 0.96. Hgb 12.7. On heparin gtt.          Assessment:     NSTEMI  Abnormal stress test   Elevated troponin with flat trajectory, peaked at 38  Lexiscan stress test 11/13- medium sized area of a moderate degree of nontransmural infarction in the apical and inferior segment of the left ventricle associated with a small area of a mild degree of navin-infarct ischemia.   Echo 11/6/2024- LVEF 45-50% with WMA  Currently chest pain free      New cardiomyopathy   LVEF in 2022 was 55-60% with new decline in LVEF to 45-50%   Appearing euvolemic     Coronary artery disease   S/p 2v CABG in 2006 (LIMA to LAD and radial graft to RCA)     Norovirus      Valvular heart disease   S/p AVR in 2006 with subsequent  perivalvular leak and redo mechanical AVR and mechanical MVR in 2013  Anticoagulated with warfarin PTA  INR 3.1 on admission     6.   Complete AV block    S/p PPM in 2022     7.   HTN   Controlled      8.   Dyslipidemia   LDL 7/2024- 66  Managed with rosuvastatin 40 mg daily            Plan:     Coronary angiogram today   Risks and benefits of coronary angiogram discussed today including, bleeding, bruising, infection, allergic reaction, kidney damage (including need for dialysis), stroke, heart attack, vascular damage, emergency open heart surgery, up to and including death.  Patient indicates understanding and is agreeable to proceed.   Remain NPO   Continue heparin gtt   Further recommendations pending the above test results   Follow up with Dr. Santo on 12/19  Cardiology to follow       Plan of care was formulated under the direction and guidance of Dr. Rai.         Keyanna PAGEC  Physician Assistant   [facility]      Patient Active Problem List   Diagnosis    Ulcerative colitis (H)    Atrial fibrillation (H)    Gastric ulcer    Bilateral low back pain with left-sided sciatica    Diaphragmatic hernia    Nocturia    Malignant neoplasm of prostate (H)    Abdominal aortic aneurysm (H)    Vitamin D deficiency disease    Anemia relative at 12.5 in 8-13     Essential hypertension, benign    Hyperlipidemia LDL goal <100    Prostate cancer (H)    Glucose intolerance (impaired glucose tolerance)    Sciatica of left side since 2000    Valvular heart disease    Heart murmur    Coronary artery disease    ACP (advance care planning)    S/P aortic valve replacement    S/P CABG (coronary artery bypass graft)    Stented coronary artery    Mixed hyperlipidemia    PVD (peripheral vascular disease) (H)    Personal history of tobacco use, presenting hazards to health    Spinal stenosis of lumbar region without neurogenic claudication    S/P mitral valve replacement    RBBB with left anterior fascicular  block    S/P lumbar spinal fusion    Long term current use of anticoagulant therapy    Chronic systolic congestive heart failure (H)    GAGE (obstructive sleep apnea)    Obesity (BMI 35.0-39.9) with comorbidity (H)    Knee pain, chronic    Diabetes mellitus, type 2 (H)    History of left occipital stroke    Longstanding persistent atrial fibrillation (H)    Chest pain       Physical Exam   Temp: 98  F (36.7  C) Temp src: Oral BP: 111/66 Pulse: 72   Resp: 18 SpO2: 94 % O2 Device: None (Room air)    There were no vitals filed for this visit.  I/O last 3 completed shifts:  In: 240 [P.O.:240]  Out: -     Constitutional: Appears stated age, well nourished, NAD.  Neck: Supple. JVD not visualized.   Respiratory: Non-labored. Lungs CTAB.  Cardiovascular: RRR. Bilateral lower extremities with no edema.   GI: Soft, non-distended, non-tender.  Skin: Warm and dry.   Musculoskeletal/Extremities: Symmetrical movement.  Neurologic: No gross focal deficits. Alert, awake.  Psychiatric: Affect appropriate. Mentation normal.    Medications   Current Facility-Administered Medications   Medication Dose Route Frequency Provider Last Rate Last Admin    heparin 25,000 units in 0.45% NaCl 250 mL ANTICOAGULANT infusion  0-5,000 Units/hr Intravenous Continuous Keyanna Lee PA-C 12.5 mL/hr at 11/13/24 2347 1,250 Units/hr at 11/13/24 2347     Current Facility-Administered Medications   Medication Dose Route Frequency Provider Last Rate Last Admin    amoxicillin-clavulanate (AUGMENTIN) 875-125 MG per tablet 1 tablet  1 tablet Oral Daily Tevin Burns MD   1 tablet at 11/13/24 1152    aspirin EC tablet 81 mg  81 mg Oral Daily Tevin Burns MD   81 mg at 11/13/24 1153    mesalamine (ASACOL HD) EC tablet 800 mg  800 mg Oral BID Tevin Bunrs MD   800 mg at 11/13/24 2036    metoprolol succinate ER (TOPROL XL) 24 hr tablet 75 mg  75 mg Oral Daily Tevin Burns MD   75 mg at 11/13/24 1153    tamsulosin (FLOMAX)  capsule 0.4 mg  0.4 mg Oral QPM Tevin Burns MD   0.4 mg at 24       Data   Last 24 hours labs personally reviewed.  Echo:   Recent Results (from the past 4320 hours)   Echocardiogram Complete   Result Value    LVEF  45-50%    [facility]    [id number]  [id number]  [id number]  [id number]^LAURY^DIPAK^LISA     [facility]  Echocardiography Laboratory  [address] [place] [address]     Name: LIANG VAUGHN  MRN: [id number]  : 1940  Study Date: 2024 10:20 AM  Age: 84 yrs  Gender: Male  Patient Location: [facility]  Reason For Study: Atrial fibrillation status post cardioversion (H), Aortic  valve  Ordering Physician: DIPAK SCHAEFFER  Referring Physician: DIPAK SCHAEFFER  Performed By: VALENTINA Walsh     BSA: 2.0 m2  Height: 65 in  Weight: 203 lb  HR: 65  BP: 121/73 mmHg  ______________________________________________________________________________  Procedure  Complete Echo Adult. Optison (NDC #6047-5022) given intravenously.  ______________________________________________________________________________  Interpretation Summary     The rhythm was atrial fibrillation with paced rhythm.  There is mild global hypokinesia of the left ventricle.  Left ventricular systolic function is mildly reduced.  The visual ejection fraction is 45-50%.  The left ventricle is normal in size.  There is moderate concentric left ventricular hypertrophy.  There is a pacemaker lead in the right ventricle.  The right ventricle is normal in structure, function and size.  There is moderate biatrial enlargement.  There is a mechanical mitral valve.(21mm St.Solo)  Normal prosthetic mitral valve gradients.( MDG 4 mmHg@ HR 65)  There is a bi-leaflet (St. Solo) aortic mechanical prosthesis. ( 27 mm) St.  Solo CHARLEE 2.45 cm2/MSG 10 mmHg/DI 0.79'/AT 50msec)  The gradient is normal for this prosthetic aortic valve.     Since the last study 8/3/2022 there has been interim development of  atrial  fibrillation and a mild decline in estimated global LV systolic performance.  ______________________________________________________________________________  Left Ventricle  The left ventricle is normal in size. There is moderate concentric left  ventricular hypertrophy. Left ventricular systolic function is mildly reduced.  The visual ejection fraction is 45-50%. Left ventricular diastolic function is  indeterminate. There is mild global hypokinesia of the left ventricle. Septal  wall motion abnormality may reflect pacemaker activation. Septal motion is  consistent with post-operative state. There is no thrombus seen in the left  ventricle.     Right Ventricle  The right ventricle is normal in structure, function and size. There is a  pacemaker lead in the right ventricle.     Atria  There is moderate biatrial enlargement. Pacer wire in right atrium. There is  no atrial shunt seen. The left atrial appendage is not well visualized.     Mitral Valve  There is no mitral regurgitation noted. There is a mechanical mitral valve.  Normal prosthetic mitral valve gradients.     Tricuspid Valve  Normal tricuspid valve. No tricuspid regurgitation. Right ventricular systolic  pressure could not be approximated due to inadequate tricuspid regurgitation.  There is no tricuspid stenosis.     Aortic Valve  The mean AoV pressure gradient is 5.5 mmHg. There is a bi-leaflet (St. Solo)  aortic mechanical prosthesis. The gradient is normal for this prosthetic  aortic valve.     Pulmonic Valve  Normal pulmonic valve. There is no pulmonic valvular regurgitation. There is  no pulmonic valvular stenosis.     Vessels  Normal size aorta. Normal size ascending aorta. The inferior vena cava is  normal. The pulmonary artery is normal size.     Pericardium  The pericardium appears normal. There is no pleural effusion.     Rhythm  The rhythm was atrial fibrillation with paced  rhythm.  ______________________________________________________________________________  MMode/2D Measurements & Calculations  IVSd: 1.4 cm     LVIDd: 4.7 cm  LVIDs: 3.4 cm  LVPWd: 1.3 cm  FS: 27.6 %  LV mass(C)d: 248.4 grams  LV mass(C)dI: 124.8 grams/m2  Ao root diam: 2.9 cm  LVOT diam: 2.0 cm  LVOT area: 3.1 cm2  Ao root diam index Ht(cm/m): 1.7  Ao root diam index BSA (cm/m2): 1.4  EF Biplane: 41.5 %  LA Volume (BP): 73.1 ml  LA Volume Index (BP): 36.7 ml/m2     RV Base: 3.9 cm  RWT: 0.54  TAPSE: 1.1 cm     Doppler Measurements & Calculations  MV max P.4 mmHg  MV mean PG: 3.7 mmHg  MV V2 VTI: 33.4 cm  MVA(VTI): 2.1 cm2  MV P1/2t max lev: 152.1 cm/sec  MV P1/2t: 86.7 msec  MVA(P1/2t): 2.5 cm2  MV dec slope: 513.6 cm/sec2  Ao V2 max: 163.8 cm/sec  Ao max PG: 10.9 mmHg  Ao V2 mean: 109.3 cm/sec  Ao mean P.5 mmHg  Ao V2 VTI: 24.3 cm  CHARLEE(I,D): 2.9 cm2  CHARLEE(V,D): 2.5 cm2  Ao acc time: 0.05 sec  LV V1 max P.8 mmHg  LV V1 max: 130.0 cm/sec  LV V1 VTI: 22.3 cm  SV(LVOT): 69.5 ml  SI(LVOT): 34.9 ml/m2  PA V2 max: 73.9 cm/sec  PA max P.2 mmHg  PA acc time: 0.10 sec  AV Lev Ratio (DI): 0.79  CHARLEE Index (cm2/m2): 1.4  RV S Lev: 8.3 cm/sec     ______________________________________________________________________________  Report approved by: Dr. Phu Lindquist 2024 12:33 PM

## 2024-11-14 NOTE — PLAN OF CARE
PIMARY Concern: chest pain; diarrhea/ N&V - norovirus +    SAFETY RISK Concerns (fall risk, behaviors, etc.): none      Aggression Tool Color: green  Isolation/Type: enteric, contact   Tests/Procedures for NEXT shift: na  Consults? (Pending/following, signed-off?) Cardiology following  Where is patient from? (Home, TCU, etc.): Home with wife  Other Important info for NEXT shift:  positive for Norovirus. heparin infusing at 1250 unit(s)/hr level to be checked at 1227 AM - continue infusion until bridged back to warfarin per provider note.   Anticipated DC date & active delays: 11/15, Pending clinical improvement     SUMMARY NOTE:  Orientation/Cognitive: A/O x4, forgetful   Observation Goals (Met/ Not Met): na inpt  Mobility Level/Assist Equipment: bedrest until 1930  Antibiotics & Plan (IV/po, length of tx left): none  Pain Management: denies pain  Tele/VS/O2: vitally stable on RA; tele v paced  ABNL Lab/BG:  hgb 12.7, INR 1.82  Diet: cardiac   Bowel/Bladder: cont. No BM  Skin Concerns: rt groin site  Drains/Devices: PIV infusing heparin and NS at 75 ml/hr  Patient Stated Goal for Today: ambulate post procedure.

## 2024-11-14 NOTE — PROGRESS NOTES
List all goals to be met before discharge home:   - Serial troponins and stress test complete: partially met   - Seen and cleared by consultant if applicable not met, angiogram planned am   - Adequate pain control on oral analgesia: met, denies pain  - Vital signs normal or at patient baseline: met  - Safe disposition plan has been identified : not met  - Nurse to notify provider when observation goals have been met and patient is ready for discharge.

## 2024-11-14 NOTE — CARE PLAN
PRIMARY Concern: chest pain; diarrhea/ N&V    SAFETY RISK Concerns (fall risk, behaviors, etc.): none      Aggression Tool Color: green  Isolation/Type: enteric R/O, contact   Tests/Procedures for NEXT shift: Angio planned for 11/14  Consults? (Pending/following, signed-off?) Cardiology following  Where is patient from? (Home, TCU, etc.): Home with wife  Other Important info for NEXT shift: enteric panel pending; heparin infusing at 1100 unit(s)/hr level to be checked at 1038pm  Anticipated DC date & active delays: TBD  _____________________________________________________________________________  SUMMARY NOTE:  Orientation/Cognitive: A/O x4, forgetful   Observation Goals (Met/ Not Met): not met  Mobility Level/Assist Equipment: IND/ SBA with IV pole  Antibiotics & Plan (IV/po, length of tx left): none  Pain Management: denies pain  Tele/VS/O2: vitally stable on RA  ABNL Lab/BG: trop 38>35, hgb 12.9, INR 2.33  Diet: reg, NPO at midnight   Bowel/Bladder: cont. Loose stools   Skin Concerns: none  Drains/Devices: PIV infusing heparin   Patient Stated Goal for Today: done.

## 2024-11-14 NOTE — PROGRESS NOTES
MD Notification    Notified Person: MD    Notified Person Name: Selina    Notification Date/Time:004    Notification Interaction: Vocera    Purpose of Notification: FYI patient positive for Norovirus     Orders Received:    Comments:

## 2024-11-14 NOTE — PLAN OF CARE
Goal Outcome Evaluation:  PRIMARY Concern: chest pain; diarrhea/ N&V    SAFETY RISK Concerns (fall risk, behaviors, etc.): none      Aggression Tool Color: green  Isolation/Type: enteric R/O, contact   Tests/Procedures for NEXT shift: Angio planned for 11/14  Consults? (Pending/following, signed-off?) Cardiology following  Where is patient from? (Home, TCU, etc.): Home with wife  Other Important info for NEXT shift: enteric panel pending; heparin infusing at 1100 unit(s)/hr, lab collected and awaiting for results. Need to collect MRSA   Anticipated DC date & active delays: TBD  _____________________________________________________________________________  11/13/24 [id number]  SUMMARY NOTE:  Orientation/Cognitive: A/O x4, forgetful   Observation Goals (Met/ Not Met): not met  Mobility Level/Assist Equipment: IND/ SBA with IV pole  Antibiotics & Plan (IV/po, length of tx left): none  Pain Management: denies pain  Tele/VS/O2: vitally stable on RA  ABNL Lab/BG: trop 38>35, hgb 12.9, INR 2.33  Diet: reg, NPO at midnight   Bowel/Bladder: cont. Loose stools   Skin Concerns: none  Drains/Devices: PIV infusing heparin   Patient Stated Goal for Today: sleep

## 2024-11-14 NOTE — PROGRESS NOTES
[facility]    Medicine Progress Note - Hospitalist Service    Date of Admission:  11/12/2024    Assessment & Plan   Fausto Farr is a 84 year-old male with past medical history significant for coronary artery disease with prior CABG, mechanical aortic and mitral valve and afib/flutter, CHB s/p PPM, HLD, GAGE on CPAP who was registered to observation on 11/12/2024 with chest pain. Lexiscan resulted abnormal and cardiology consulted, he was flipped to inpatient status and cardiology recommended coronary angiogram.     NSTEMI, Chest pain   New cardiomyopathy EF decreased from 55-60 -->45-50%  Coronary artery disease with prior 2v CABG (LIMA to LAD, radial grafting to RCA) 1990s  *Presented with central chest ache, non-exertional, followed by n/v/d (see below). EKG V-paced rhythm with frequent PVCs. Troponin 38->35. Last stress test (NM Lexiscan) 4/2019 negative for ischemia. Recent ECHO noted as below, EF has decreased from 55-60%  in 2022 to 45-40%, found to be in Afib for past 5-6 months on recent device check, since cardioversion 5/2024.   *Lexiscan abnormal, TID is absent,  medium sized area of a moderate degree of nontransmural infarction in the apical and inferior segment(s) of the LV associated with a small area of a mild degree of navin-infarct ischemia. LV function is mildly reduced. LVEF at rest is 43% and 44% with stress. Prior study was conducted on 4/18/2019. No change when compared with the prior study.  *Given associated n/v/d, suspicion for GI etiology of symptoms.    - Observation status  - Aspirin 81mg/d  - Cardiac monitoring  - NTG SL PRN   - Cardiology following, appreciate assistance   -Heparin gtt   -Coronary angiogram 11/14/24   - NPO for cor angio    Norovirus  Nausea, vomiting, diarrhea, improving  Presented with initial symptoms of lower abdominal comfort and later 3-4 episodes of nausea and vomiting, 4 episodes of watery diarrhea. Ate at [employer] day prior to  symptom onset, family ate similar meals without symptoms. 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 Symptoms improving on 11/13/24. +Norovirus on enteric panel.   - Ondansetron IV/PO, prochlorperazine IV/PO PRN  - Maalox PRN  - Supportive cares     Atypical atrial flutter and atrial fibrillation s/p DCCV 5/2024  Complete heart block s/p PPM  V paced. Recent device check 11/6 shows patient has been back in Afib since mid June. Reported dyspnea with climbing stairs but denied with other activities. COVID in Sept and pneumonia in October. Recent ECHO 11/6/24 showed decline in EF since 2022 study from 55-60% to 45-50%. Afib and paced. Mild global hypokinesia of LV. Moderate cLVH. Moderate biatrial enlargement, mechanical mitral valve and AVR. No thrombus in LV.   - Continue prior to admission metoprolol XL with hold parameters  - Anticoagulation managed as below    S/p mechanical AVR 2006 with redo 2013 for perivalvular leak  S/p mechanical MVR 2013  Coagulopathy secondary to warfarin   INR 3.1-2.3, held dose evening 11/12 for stress test. Goal is 2.5-3.5 in setting of MVR.   - Hold warfarin pending angiogram  - Heparin gtt while subtherapeutic, will need bridging on discharge until INR is back to goal given mechanical mitral/aortic valves  - Daily INR    S/p endovascular AAA repair with persistent endoleak  Slightly increased on CTA compared to 8/2024. Follows with interventional radiology with serial monitoring. Unlikely contributing to acute symptoms.     Obstructive sleep apnea: Continue CPAP per home settings    Hyperlipidemia: Hold prior to admission ezetimibe, rosuvastatin while  "observation status     Ulcerative colitis  Reports symptoms recently stable prior to diarrhea above.   - Continue prior to admission mesalamine     BPH: Continue prior to admission tamsulosin    Hx of septic arthritis: Continue prior to admission amoxicillin-clavulanate PO          Diet: NPO for Medical/Clinical Reasons Except for: Meds    DVT Prophylaxis: Heparin gtt  Lord Catheter: Not present  Lines: None     Cardiac Monitoring: ACTIVE order. Indication: Chest pain/ ACS rule out (24 hours)  Code Status: Full Code      Clinically Significant Risk Factors Present on Admission                # Drug Induced Coagulation Defect: home medication list includes an anticoagulant medication    # Hypertension: Noted on problem list  # Heart failure, NOS: heart failure noted on the problem list and last echo with EF 40-50%         # DMII: A1C = 6.9 % (Ref range: 0.0 - 5.6 %) within past 6 months    # Obesity: Estimated body mass index is 33.85 kg/m  as calculated from the following:    Height as of 11/4/24: 1.651 m (5' 5\").    Weight as of 11/4/24: 92.3 kg (203 lb 6.4 oz).        # Pacemaker present  # History of CABG: noted on surgical history       Disposition Plan     Medically Ready for Discharge: Anticipated in 2-4 Days           The patient's care was discussed with the Attending Physician, Dr. Ramos, Bedside Nurse, and Patient.    Diane Ignacio PA-C  Hospitalist Service  [facility]  Securely message with [employer] (more info)  Text page via [facility] Paging/Directory   ______________________________________________________________________    Interval History   Seen and examined. Denies CP. NPO for coronary angiogram. GI symptoms improving, discussed Norovirus. Questions welcomed and answered to the best of my knowledge.    Physical Exam   Vital Signs: Temp: 97.6  F (36.4  C) Temp src: Oral BP: 118/72 Pulse: 70   Resp: 18 SpO2: 91 % O2 Device: None (Room air)    Weight: 0 lbs 0 " oz    General: Awake, alert, very pleasant man who appears stated age. Looks comfortable sitting up in bed. No acute distress.  HEENT: Normocephalic, atraumatic. Extraocular movements intact.   Respiratory: Clear to auscultation bilaterally, no rales, wheezing, or rhonchi.  Cardiovascular: Regular rate and rhythm, +S1 and S2, mechanical sounding valves. No peripheral edema.   Gastrointestinal: Soft, non-tender, non-distended. Bowel sounds present.  Skin: Warm, dry. No obvious rashes or lesions on exposed skin. Dorsalis pedis pulses palpable bilaterally.  Musculoskeletal: No joint swelling, erythema or tenderness. Moves all extremities equally.  Neurologic: AAO x3.   Psychiatric: Appropriate mood and affect. No obvious anxiety or depression.    Medical Decision Making       >35 MINUTES SPENT BY ME on the date of service doing chart review, history, exam, documentation & further activities per the note.      Data     I have personally reviewed the following data over the past 24 hrs:    6.6  \   12.7 (L)   / 248     138 103 15.6 /  108 (H)   4.5 27 0.96 \     INR:  1.82 (H) PTT:  N/A   D-dimer:  N/A Fibrinogen:  N/A       Imaging results reviewed over the past 24 hrs:   No results found for this or any previous visit (from the past 24 hours).

## 2024-11-14 NOTE — PLAN OF CARE
IMARY Concern: chest pain; diarrhea/ N&V    SAFETY RISK Concerns (fall risk, behaviors, etc.): none      Aggression Tool Color: green  Isolation/Type: enteric R/O, contact   Tests/Procedures for NEXT shift: Angio planned for today   Consults? (Pending/following, signed-off?) Cardiology following  Where is patient from? (Home, TCU, etc.): Home with wife  Other Important info for NEXT shift:  positive for Norovirus. heparin infusing at 1200 unit(s)/hr level to be checked at 12:50pm  Anticipated DC date & active delays: TBD. Pending clinical improvement     SUMMARY NOTE:  Orientation/Cognitive: A/O x4, forgetful   Observation Goals (Met/ Not Met): not met  Mobility Level/Assist Equipment: IND/ SBA with IV pole  Antibiotics & Plan (IV/po, length of tx left): none  Pain Management: denies pain  Tele/VS/O2: vitally stable on RA  ABNL Lab/BG: trop 38>35, hgb 12.9, INR 2.33  Diet: reg, NPO at midnight   Bowel/Bladder: cont. No BM  Skin Concerns: none  Drains/Devices: PIV infusing heparin   Patient Stated Goal for Today: Slee

## 2024-11-15 ENCOUNTER — RESULTS ONLY (OUTPATIENT)
Dept: ADMINISTRATIVE | Facility: CLINIC | Age: 84
End: 2024-11-15
Payer: MEDICARE

## 2024-11-15 ENCOUNTER — APPOINTMENT (OUTPATIENT)
Dept: PHYSICAL THERAPY | Facility: CLINIC | Age: 84
DRG: 322 | End: 2024-11-15
Attending: STUDENT IN AN ORGANIZED HEALTH CARE EDUCATION/TRAINING PROGRAM
Payer: MEDICARE

## 2024-11-15 LAB
ANION GAP SERPL CALCULATED.3IONS-SCNC: 11 MMOL/L (ref 7–15)
ATRIAL RATE - MUSE: 288 BPM
BACTERIA SPEC CULT: NORMAL
BUN SERPL-MCNC: 13 MG/DL (ref 8–23)
CALCIUM SERPL-MCNC: 8.6 MG/DL (ref 8.8–10.4)
CHLORIDE SERPL-SCNC: 101 MMOL/L (ref 98–107)
CHOLEST SERPL-MCNC: 124 MG/DL
CREAT SERPL-MCNC: 0.79 MG/DL (ref 0.67–1.17)
DIASTOLIC BLOOD PRESSURE - MUSE: NORMAL MMHG
EGFRCR SERPLBLD CKD-EPI 2021: 88 ML/MIN/1.73M2
GLUCOSE SERPL-MCNC: 106 MG/DL (ref 70–99)
HCO3 SERPL-SCNC: 22 MMOL/L (ref 22–29)
HDLC SERPL-MCNC: 30 MG/DL
INR PPP: 1.35 (ref 0.85–1.15)
INR PPP: 1.39 (ref 0.85–1.15)
INTERPRETATION ECG - MUSE: NORMAL
LDLC SERPL CALC-MCNC: 76 MG/DL
NONHDLC SERPL-MCNC: 94 MG/DL
P AXIS - MUSE: NORMAL DEGREES
POTASSIUM SERPL-SCNC: 3.8 MMOL/L (ref 3.4–5.3)
PR INTERVAL - MUSE: NORMAL MS
QRS DURATION - MUSE: 174 MS
QT - MUSE: 482 MS
QTC - MUSE: 535 MS
R AXIS - MUSE: 92 DEGREES
SODIUM SERPL-SCNC: 134 MMOL/L (ref 135–145)
SYSTOLIC BLOOD PRESSURE - MUSE: NORMAL MMHG
T AXIS - MUSE: -3 DEGREES
TRIGL SERPL-MCNC: 92 MG/DL
UFH PPP CHRO-ACNC: 0.49 IU/ML
UFH PPP CHRO-ACNC: <0.1 IU/ML
VENTRICULAR RATE- MUSE: 74 BPM

## 2024-11-15 PROCEDURE — 82565 ASSAY OF CREATININE: CPT | Performed by: PHYSICIAN ASSISTANT

## 2024-11-15 PROCEDURE — 97110 THERAPEUTIC EXERCISES: CPT | Mod: GP

## 2024-11-15 PROCEDURE — 97530 THERAPEUTIC ACTIVITIES: CPT | Mod: GP

## 2024-11-15 PROCEDURE — 85520 HEPARIN ASSAY: CPT | Performed by: PHYSICIAN ASSISTANT

## 2024-11-15 PROCEDURE — 250N000013 HC RX MED GY IP 250 OP 250 PS 637: Performed by: INTERNAL MEDICINE

## 2024-11-15 PROCEDURE — 84520 ASSAY OF UREA NITROGEN: CPT | Performed by: PHYSICIAN ASSISTANT

## 2024-11-15 PROCEDURE — 97161 PT EVAL LOW COMPLEX 20 MIN: CPT | Mod: GP

## 2024-11-15 PROCEDURE — 82435 ASSAY OF BLOOD CHLORIDE: CPT | Performed by: PHYSICIAN ASSISTANT

## 2024-11-15 PROCEDURE — 250N000011 HC RX IP 250 OP 636

## 2024-11-15 PROCEDURE — 36415 COLL VENOUS BLD VENIPUNCTURE: CPT | Performed by: PHYSICIAN ASSISTANT

## 2024-11-15 PROCEDURE — 210N000002 HC R&B HEART CARE

## 2024-11-15 PROCEDURE — 93005 ELECTROCARDIOGRAM TRACING: CPT

## 2024-11-15 PROCEDURE — 99232 SBSQ HOSP IP/OBS MODERATE 35: CPT | Performed by: PHYSICIAN ASSISTANT

## 2024-11-15 PROCEDURE — 99233 SBSQ HOSP IP/OBS HIGH 50: CPT | Performed by: INTERNAL MEDICINE

## 2024-11-15 PROCEDURE — 85610 PROTHROMBIN TIME: CPT | Performed by: PHYSICIAN ASSISTANT

## 2024-11-15 PROCEDURE — 250N000013 HC RX MED GY IP 250 OP 250 PS 637: Performed by: STUDENT IN AN ORGANIZED HEALTH CARE EDUCATION/TRAINING PROGRAM

## 2024-11-15 RX ORDER — NALOXONE HYDROCHLORIDE 0.4 MG/ML
0.2 INJECTION, SOLUTION INTRAMUSCULAR; INTRAVENOUS; SUBCUTANEOUS
Status: DISCONTINUED | OUTPATIENT
Start: 2024-11-15 | End: 2024-11-16 | Stop reason: HOSPADM

## 2024-11-15 RX ORDER — NALOXONE HYDROCHLORIDE 0.4 MG/ML
0.4 INJECTION, SOLUTION INTRAMUSCULAR; INTRAVENOUS; SUBCUTANEOUS
Status: DISCONTINUED | OUTPATIENT
Start: 2024-11-15 | End: 2024-11-16 | Stop reason: HOSPADM

## 2024-11-15 RX ORDER — WARFARIN SODIUM 10 MG/1
10 TABLET ORAL
Status: COMPLETED | OUTPATIENT
Start: 2024-11-15 | End: 2024-11-15

## 2024-11-15 RX ADMIN — METOPROLOL SUCCINATE 75 MG: 50 TABLET, EXTENDED RELEASE ORAL at 08:45

## 2024-11-15 RX ADMIN — TAMSULOSIN HYDROCHLORIDE 0.4 MG: 0.4 CAPSULE ORAL at 21:22

## 2024-11-15 RX ADMIN — ASPIRIN 81 MG: 81 TABLET, COATED ORAL at 08:45

## 2024-11-15 RX ADMIN — AMOXICILLIN AND CLAVULANATE POTASSIUM 1 TABLET: 875; 125 TABLET, FILM COATED ORAL at 08:45

## 2024-11-15 RX ADMIN — MESALAMINE 800 MG: 800 TABLET, DELAYED RELEASE ORAL at 21:38

## 2024-11-15 RX ADMIN — CLOPIDOGREL BISULFATE 75 MG: 75 TABLET ORAL at 08:45

## 2024-11-15 RX ADMIN — MESALAMINE 800 MG: 800 TABLET, DELAYED RELEASE ORAL at 08:45

## 2024-11-15 RX ADMIN — WARFARIN SODIUM 10 MG: 10 TABLET ORAL at 17:28

## 2024-11-15 RX ADMIN — HEPARIN SODIUM 1550 UNITS/HR: 10000 INJECTION, SOLUTION INTRAVENOUS at 23:35

## 2024-11-15 NOTE — PHARMACY-ANTICOAGULATION SERVICE
Clinical Pharmacy - Warfarin Dosing Consult     Pharmacy has been consulted to manage this patient s warfarin therapy.  Indication: Atrial Fibrillation;Mechanical Aortic Valve Replacement;Mechanical Mitral Valve Replacement  Therapy Goal: INR 2.5-3.5  Warfarin Prior to Admission: Yes  Warfarin PTA Regimen: 7.5mg MWF, 5mg row    INR   Date Value Ref Range Status   11/15/2024 1.35 (H) 0.85 - 1.15 Final   11/15/2024 1.39 (H) 0.85 - 1.15 Final       Recommend warfarin 10 mg today.  Pharmacy will monitor Fausto Farr daily and order warfarin doses to achieve specified goal.      Please contact pharmacy as soon as possible if the warfarin needs to be held for a procedure or if the warfarin goals change.

## 2024-11-15 NOTE — PROGRESS NOTES
Cardiology Progress Note          Assessment and Plan:         84-year-old gentleman status post AVR in 2006 with subsequent MVR/AVR, both mechanical in 2013.  He is also status post coronary artery bypass grafting x 2 with a LIMA to the LAD and radial graft to the RCA.  Other comorbidities include complete AV block status post pacemaker implantation and history of atrial flutter status post ablation.     He was admitted to [facility] on 11/12/2024 with nausea, diarrhea associated with chest discomfort.  Cardiac troponins were mildly elevated and a subsequent stress perfusion study demonstrated an inferior/inferolateral infarct.  Mild navin-infarct ischemia was noted.  The also appears to be apical anterior ischemia.    He is status post coronary angiography and PCI to the proximal/mid LAD with 2 drug-eluting stents on 11/14/2024.  His LIMA graft is atretic whereas the radial graft is patent.  Overnight heparin drip was placed on hold due to right groin bleeding.    IMPRESSION:    Non-ST elevation myocardial infarction status post drug-eluting stent placement x 2 to the LAD on 11/14/2024.  History of mechanical AVR/MVR in October 2013.  Heparin placed on hold overnight due to right groin bleeding.  Coronary artery disease status post coronary bypass grafting in the past with a LIMA to the LAD and radial graft into the right coronary artery.  AV block status post pacemaker plantation.  Atrial flutter post ablation.    PLAN    -Agree with resumption of heparin infusion without a bolus this morning.  -We will continue triple therapy for 1 week and discontinue aspirin at that point.  -Continue to monitor on IV heparin, will consider discharge tomorrow if no further bleeding issues with a Lovenox bridge.        Interval History:     Bleeding noted overnight from right groin site.  Heparin drip was temporarily held.  It was restarted this morning.             Review of Systems:   As per subjective, otherwise 5  systems reviewed and negative.           Physical Exam:   Blood pressure 127/68, pulse 67, temperature 97.4  F (36.3  C), temperature source Oral, resp. rate 17, SpO2 94%.      Vital Sign Ranges  Temperature Temp  Av.4  F (36.3  C)  Min: 97.1  F (36.2  C)  Max: 97.6  F (36.4  C)   Blood pressure Systolic (24hrs), Av , Min:109 , Max:135        Diastolic (24hrs), Av, Min:63, Max:72      Pulse Pulse  Av.3  Min: 67  Max: 73   Respirations Resp  Av  Min: 16  Max: 19   Pulse oximetry SpO2  Av.6 %  Min: 91 %  Max: 98 %       No intake or output data in the 24 hours ending 11/15/24 1003    Constitutional: NAD  Skin: Warm and dry  Neck: No JVD  Lungs: CTA  Cardiovascular: RRR, no m/r/g  Abdomen: Soft, non tender.  Extremities and Back: No LE edema.  Small palpable hematoma at right groin access site.  No bruit.  Neurological: Nonfocal           Medications:     I have reviewed this patient's current medications.              Data:     Labs reviewed.             Sara Rai MD, M.D.

## 2024-11-15 NOTE — PROVIDER NOTIFICATION
MD Notification    Notified Person: MD    Notified Person Name: Dr. Wyatt    Notification Date/Time: 11/14 @ 2040    Notification Interaction: Vocmike     Purpose of Notification: Hi pt was off bedrest at 1930 w/ no complications. Rechecked around 2020 and R groin site was bleeding. New pressure dressing applied and pt put back on bedrest until 2230. Pt still has heparin gtt running at 1250 units/hr. Please advise, thank you.     Orders Received: Hold heparin drip overnight, Team will reassess tomorrow about restarting.    Comments:

## 2024-11-15 NOTE — PROGRESS NOTES
11/15/24 1330   Appointment Info   Signing Clinician's Name / Credentials (PT) Karyna Abreu, PT, DPT   Rehab Comments (PT) Cardiac Rehab   Living Environment   People in Home spouse   Current Living Arrangements house   Home Accessibility stairs to enter home;stairs within home   Number of Stairs, Main Entrance 6   Stair Railings, Main Entrance railings safe and in good condition;railings on both sides of stairs   Number of Stairs, Within Home, Primary eight   Stair Railings, Within Home, Primary railings safe and in good condition;railings on both sides of stairs   Transportation Anticipated family or friend will provide   Living Environment Comments Patient lives in a split level house with his wife.   Self-Care   Usual Activity Tolerance good   Current Activity Tolerance good   Regular Exercise Yes   Activity/Exercise Type other (see comments)  (PT exercises for back)   Exercise Amount/Frequency daily   Equipment Currently Used at Home none   Fall history within last six months no   Activity/Exercise/Self-Care Comment Patient is independent with mobility and ADLs at baseline. He drives. He has been going to OP PT for his back.   General Information   Onset of Illness/Injury or Date of Surgery 11/12/24   Referring Physician Santos Nolasco MD   Patient/Family Therapy Goals Statement (PT) to go home   Pertinent History of Current Problem (include personal factors and/or comorbidities that impact the POC) Patient is 83 YO M who presented to hospital on 11/12/24 for chest pain and abnormal stress test, NSTEMI, new cardiomyopathy and Norovirus. Patient is s/p coronary angiogram with PCI to proximal-mid LAD. Additional PMH includes CABG, mechanical aortic and mitral valve and afib/flutter, CHB s/p PPM, HLD, GAGE on CPAP.   Existing Precautions/Restrictions cardiac;other (see comments)  (Enteric)   General Observations Patient in supine with daughter present.   Cognition   Affect/Mental Status (Cognition) WFL    Orientation Status (Cognition) oriented x 4   Follows Commands (Cognition) [facility]   Pain Assessment   Patient Currently in Pain No   Integumentary/Edema   Integumentary/Edema no deficits were identifed   Posture    Posture Forward head position;Protracted shoulders   Range of Motion (ROM)   Range of Motion ROM is WFL   Strength (Manual Muscle Testing)   Strength (Manual Muscle Testing) strength is [facility]   Bed Mobility   Comment, (Bed Mobility) Supine to sit independently   Transfers   Comment, (Transfers) Independent sit <> stand with no assistive device   Gait/Stairs (Locomotion)   [place] Level (Gait) supervision   Distance in Feet (Gait) 10'   Comment, (Gait/Stairs) Patient ambulated in room, widened base of support and forward flexed posture   Balance   Balance Comments No losses of balance with ambulation   Sensory Examination   Sensory Perception patient reports no sensory changes   Coordination   Coordination no deficits were identified   Clinical Impression   Criteria for Skilled Therapeutic Intervention Yes, treatment indicated   PT Diagnosis (PT) Impaired mobility   Influenced by the following impairments Chronic LBP, Decreased activity tolerance   Functional limitations due to impairments Increased difficulty with ambulation   Clinical Presentation (PT Evaluation Complexity) stable   Clinical Presentation Rationale Medical status, clinical judgement, stable vital signs   Clinical Decision Making (Complexity) low complexity   Planned Therapy Interventions (PT) gait training;home exercise program;patient/family education;stair training;progressive activity/exercise;home program guidelines   Risk & Benefits of therapy have been explained evaluation/treatment results reviewed;care plan/treatment goals reviewed;risks/benefits reviewed;current/potential barriers reviewed;participants voiced agreement with care plan;participants included;patient   PT Total Evaluation Time   PT Eval, Low Complexity Minutes  (84786) 10   Physical Therapy Goals   PT Frequency Daily   PT Predicted Duration/Target Date for Goal Attainment 11/18/24   PT Goals Cardiac Phase 1   PT: Understanding of cardiac education to maximize quality of life, condition management, and health outcomes Patient;Verbalize   PT: Perform aerobic activity with stable cardiovascular response continuous;5 minutes;NuStep   PT: Functional/aerobic ambulation tolerance with stable cardiovascular response in order to return to home and community environment Independent;200 feet   PT: Navigation of stairs simulating home set up with stable cardiovascular response in order to return to home and community environment Modified independent;6 stairs;Rail on both sides;Goal Met   Interventions   Interventions Quick Adds Cardiac Rehab;Therapeutic Procedure;Therapeutic Activity   Therapeutic Procedure/Exercise   Ther. Procedure: strength, endurance, ROM, flexibillity Minutes (21775) 9   Symptoms Noted During/After Treatment shortness of breath   Treatment Detail/Skilled Intervention See Ambulation and stairs sections for details.   Treatment Time Includes (CR Only) See specific exercise details intervention group(s);Monitoring of vital signs (see vital signs flowsheet for details)   Therapeutic Activity   Therapeutic Activities: dynamic activities to improve functional performance Minutes (35440) 10   Symptoms Noted During/After Treatment None   Treatment Detail/Skilled Intervention Patient sitting up in bed, agreeable to CR. PCI packet provided and reviewed with patient. Education on Cardiac Rehab, OMNI Effort Scale, Signs and Symptoms of intolerance of exercise and home walking program. Patient verbalizing understanding. Sit <> stand from EOB x 2 reps and chair x2 reps independently. Following exercise, patient transferred from EOB to chair independently.   Ambulation   Workload Ambulation in hallway with no AD   Duration (minutes) 4 minutes   Effort Scale 3   Symptoms Dyspnea    Cardiovascular Response Normal   Exercise Details Patient ambulated in hallway with no AD and slow terry, multiple brief standing rest breaks due to back pain.   Stairs   Workload Stairs with B railings   Duration (minutes) 2 minutes   Effort Scale 4   Symptoms Dyspnea   Cardiovascular Response Normal   Exercise Details 8 stairs with B railings   Vital Signs Details See VS flowsheet   Cardiac Education   Education Provided Home exercise program;Energy conservation;OMNI Scale;Outpatient Cardiac Rehab;Precautions;Signs and symptoms;Stop light tool;Risk factors   Education Packet Given to Patient Yes   All Patient Education Handouts Reviewed with Patient and/or Family Yes   Cardiac Rehab Phase II Plan   Phase II Order Received Yes   Phase II Appointment Status Scheduled   Date/Time 11/22/24, 8 am   Location [facility]   PT Discharge Planning   PT Plan Bring Stoplight Tool and Return to Activity After Heart Attack handouts; Try Nu-step due to chronic LBP   PT Discharge Recommendation (DC Rec) home with outpatient physical therapy   PT Rationale for DC Rec Patient is ambulating independently, mobility limited by chronic low back pain. He has supportive wife at home and local daughters to assist as needed. Recommend outpatient CR for continued recovery s/p NSTEMI and PCI.   PT Brief overview of current status Goals of therapy will be to address safe mobility and make recs for d/c to next level of care. Pt and RN will continue to follow all falls risk precautions as documented by RN staff while hospitalized.   Physical Therapy Time and Intention   Timed Code Treatment Minutes 19   Total Session Time (sum of timed and untimed services) 29

## 2024-11-15 NOTE — PLAN OF CARE
Here for CP, s/p PCI to the LAD yesterday. Norovirus positive on Enteric precautions. A&O x4.  Neuros intact. VSS on RA. R groin site C/D/I. NO bleeding or hematoma present. Heparin gtt running. Up independently. Tele: Vpaced. Denied pain. Voiding in BR. Tolerating cardiac diet without complaints of nausea/vomiting. Discharge likely tomorrow.

## 2024-11-15 NOTE — PLAN OF CARE
PRIMARY Concern: Chest pain, norovirus, post angio    SAFETY RISK Concerns (fall risk, behaviors, etc.): Falls  Aggression Tool Color: green  Isolation/Type: enteric/contact  Tests/Procedures for NEXT shift: None  Consults? (Pending/following, signed-off?) Cardiology following  Where is patient from? (Home, TCU, etc.): Home with wife  Other Important info for NEXT shift:  Hep held overnight (see provider notif note). R groin site bled after initial bedrest. Dressings changed 2x, pt off bedrest at 0100 w/ no complications   Anticipated DC date & active delays: TBD. Pending clinical improvement     SUMMARY NOTE:  Orientation/Cognitive: A/O x4, forgetful   Observation Goals (Met/ Not Met): Inpatient  Mobility Level/Assist Equipment: SBA  Antibiotics & Plan (IV/po, length of tx left): none  Pain Management: denies pain  Tele/VS/O2: VSS on RA, tele: V paced w/ occ PVCs  ABNL Lab/BG: INR 1.82, Xa 0.5  Diet: Cardiac  Bowel/Bladder: cont. No BM  Skin Concerns: none  Drains/Devices: PIV SL  Patient Stated Goal for Today: Sleep

## 2024-11-15 NOTE — PROGRESS NOTES
[facility]    Medicine Progress Note - Hospitalist Service    Date of Admission:  11/12/2024    Assessment & Plan   Fausto Farr is a 84 year-old male with past medical history significant for coronary artery disease with prior CABG, mechanical aortic and mitral valve and afib/flutter, CHB s/p PPM, HLD, GAGE on CPAP who was registered to observation on 11/12/2024 with chest pain. Lexiscan resulted abnormal and cardiology consulted, he was flipped to inpatient status and cardiology recommended coronary angiogram.     NSTEMI, Chest pain s/p PCI x2 REYNA to LAD 11/14/24  New cardiomyopathy EF decreased from 55-60 -->45-50%  Coronary artery disease with prior 2v CABG (LIMA to LAD, radial grafting to RCA) 1990s  *Presented with central chest ache, non-exertional, followed by n/v/d (see below). EKG V-paced rhythm with frequent PVCs. Troponin 38->35. Last stress test (NM Lexiscan) 4/2019 negative for ischemia. Recent ECHO noted as below, EF has decreased from 55-60%  in 2022 to 45-40%, found to be in Afib for past 5-6 months on recent device check, since cardioversion 5/2024.   *Lexiscan abnormal, TID is absent,  medium sized area of a moderate degree of nontransmural infarction in the apical and inferior segment(s) of the LV associated with a small area of a mild degree of navin-infarct ischemia. LV function is mildly reduced. LVEF at rest is 43% and 44% with stress. Prior study was conducted on 4/18/2019. No change when compared with the prior study.  *Given associated n/v/d, suspicion for GI etiology of symptoms.    *status post coronary angiography and PCI to the proximal/mid LAD with 2 drug-eluting stents on 11/14/2024. LIMA graft is atretic whereas the radial graft is patent. Overnight heparin drip was placed on hold due to right groin bleeding and resumed the following morning.   - Inpatient status  - Aspirin 81mg/d  - Cardiac monitoring  - NTG SL PRN   - Cardiology following, appreciate  assistance   -Heparin gtt resumed, held night of cor angio due to some concern of bleeding   -Continue triple therapy for 1 week and discontinue aspirin at that point - warfarin, clopidogrel, aspirin 81mg    -Coronary angiogram 11/14/24 with x2 overlapping stents placed   -consider discharge on 11/16/24 if no further bleeding issues with lovenox bridge  - Resume warfarin, pharmacy to dose  - Needs bridging AC with heparin/lovenox subcutaneous on discharge given mechanical MV  - Cardiac rehab - home with outpatient PT  - RD consulted for diet education, appreciate assistance    Norovirus  Nausea, vomiting, diarrhea, improved  Presented with initial symptoms of lower abdominal comfort and later 3-4 episodes of nausea and vomiting, 4 episodes of watery diarrhea. Ate at [employer] day prior to symptom onset, family ate similar meals without symptoms. 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 +Norovirus on enteric panel.   - Symptoms now resolved  - Ondansetron IV/PO, prochlorperazine IV/PO PRN  - Maalox PRN  - Supportive cares     Atypical atrial flutter and atrial fibrillation s/p DCCV 5/2024  Complete heart block s/p PPM  V paced. Recent device check 11/6 shows patient has been back in Afib since mid June. Reported dyspnea with climbing stairs but denied with other activities. COVID in Sept and pneumonia in October. Recent ECHO 11/6/24 showed decline in EF since 2022 study from 55-60% to 45-50%. Afib and paced. Mild global hypokinesia of LV. Moderate cLVH. Moderate biatrial enlargement, mechanical mitral valve and AVR. No thrombus in LV.   - Continue prior to admission metoprolol XL with hold parameters  - Anticoagulation  "managed as below    S/p mechanical AVR 2006 with redo 2013 for perivalvular leak  S/p mechanical MVR 2013  Coagulopathy secondary to warfarin   INR 3.1-2.3, held dose evening 11/12 for stress test. Goal is 2.5-3.5 in setting of MVR.   - Resume PTA warfarin, pharmacy to dose  - Heparin gtt while subtherapeutic, will need bridging on discharge until INR is back to goal given mechanical mitral/aortic valves  - Daily INR    S/p endovascular AAA repair with persistent endoleak  Slightly increased on CTA compared to 8/2024. Follows with interventional radiology with serial monitoring. Unlikely contributing to acute symptoms.     Obstructive sleep apnea: Continue CPAP per home settings    Hyperlipidemia: Hold prior to admission ezetimibe, rosuvastatin while observation status     Ulcerative colitis  Reports symptoms recently stable prior to diarrhea above.   - Continue prior to admission mesalamine     BPH: Continue prior to admission tamsulosin    Hx of septic arthritis: Continue prior to admission amoxicillin-clavulanate PO          Diet: Low Saturated Fat Na <2400 mg    DVT Prophylaxis: Warfarin and heparin gtt  Lord Catheter: Not present  Lines: None     Cardiac Monitoring: ACTIVE order. Indication: Post- PCI/Angiogram (24 hours)  Code Status: Full Code      Clinically Significant Risk Factors         # Hyponatremia: Lowest Na = 134 mmol/L in last 2 days, will monitor as appropriate          # Coagulation Defect: INR = 1.35 (Ref range: 0.85 - 1.15) and/or PTT = N/A, will monitor for bleeding    # Hypertension: Noted on problem list  # Heart failure, NOS: heart failure noted on the problem list and last echo with EF 40-50%          # DMII: A1C = 6.9 % (Ref range: 0.0 - 5.6 %) within past 6 months, PRESENT ON ADMISSION  # Obesity: Estimated body mass index is 33.85 kg/m  as calculated from the following:    Height as of 11/4/24: 1.651 m (5' 5\").    Weight as of 11/4/24: 92.3 kg (203 lb 6.4 oz)., PRESENT ON ADMISSION    "   # Pacemaker present  # History of CABG: noted on surgical history       Social Drivers of Health    Tobacco Use: Medium Risk (11/4/2024)    Patient History     Smoking Tobacco Use: Former     Smokeless Tobacco Use: Never   Alcohol Use: Unknown (11/26/2019)    AUDIT-C     Frequency of Alcohol Consumption: 2-4 times a month     Average Number of Drinks: 1 or 2   Physical Activity: Unknown (11/4/2024)    Exercise Vital Sign     Days of Exercise per Week: Patient declined     Minutes of Exercise per Session: 20 min   Social Connections: Unknown (11/4/2024)    Social Connection and Isolation Panel [NHANES]     Frequency of Social Gatherings with Friends and Family: Twice a week          Disposition Plan     Medically Ready for Discharge: Anticipated Tomorrow           The patient's care was discussed with the Attending Physician, Dr. Ramos, Bedside Nurse, Care Coordinator/, Patient, and Cardiology Consultant(s).    Diane Ignacio PA-C  Hospitalist Service  [facility]  Securely message with [employer] (more info)  Text page via [employer] Paging/Directory   ______________________________________________________________________    Interval History   Seen and examined.  No new complaints.  Discussed coronary angiogram, plan for anticoagulation.  Patient with questions about subtherapeutic INR, restarting heparin drip.  Noted he had some bleeding to his post angio site last night and heparin was held overnight.  Discussed case with Dr. Rai of cardiology regarding anticoagulation. Questions welcomed and answered to the best of my knowledge.    Physical Exam   Vital Signs: Temp: 97.7  F (36.5  C) Temp src: Oral BP: 106/63 Pulse: 70   Resp: 18 SpO2: 96 % O2 Device: None (Room air) Oxygen Delivery: 2 LPM  Weight: 0 lbs 0 oz    General: Awake, alert, very pleasant man who appears stated age. Looks comfortable sitting up in bed. No acute distress.  HEENT: Normocephalic, atraumatic.  Extraocular movements intact.   Respiratory: Clear to auscultation bilaterally, no rales, wheezing, or rhonchi.  Cardiovascular: Regular rate and rhythm, +S1 and S2, mechanical sounding valves. No peripheral edema.   Gastrointestinal: Soft, non-tender, non-distended. Bowel sounds present.  Skin: Warm, dry. No obvious rashes or lesions on exposed skin. Dorsalis pedis pulses palpable bilaterally.  Groin site dressing clean dry and intact.  No hematoma appreciated.  Musculoskeletal: No joint swelling, erythema or tenderness. Moves all extremities equally.  Neurologic: AAO x3.   Psychiatric: Appropriate mood and affect. No obvious anxiety or depression.    Medical Decision Making       45 MINUTES SPENT BY ME on the date of service doing chart review, history, exam, documentation & further activities per the note.      Data     I have personally reviewed the following data over the past 24 hrs:    N/A  \   N/A   / N/A     134 (L) 101 13.0 /  106 (H)   3.8 22 0.79 \     INR:  1.35 (H) PTT:  N/A   D-dimer:  N/A Fibrinogen:  N/A       Imaging results reviewed over the past 24 hrs:   Recent Results (from the past 24 hours)   Cardiac Catheterization    Narrative    Severe multivessel obstructive coronary artery disease status post   coronary artery bypass grafting  The LIMA-LAD is now atretic/closed  Patent left radial arterial graft to distal RPDA  Severe, obstructive hemodynamically significant coronary artery disease of   the proximal-mid LAD  Successful, IVUS guided PCI of the proximal-mid LAD with deployment of a 4   x 28 mm overlapped by a 3 x 38 mm Gabriele frontier drug-eluting stents  Moderate nonobstructive coronary artery disease of the left circumflex  Successful, uncomplicated right femoral arterial access with hemostasis   achieved at the end of the case with 6 Romansh Angio-Seal deployment

## 2024-11-15 NOTE — CONSULTS
NUTRITION EDUCATION    REASON FOR ASSESSMENT:  Nutrition education on American Heart Association (AHA) Heart Healthy Diet.    NUTRITION HISTORY:  Information obtained from patient at bedside today.     Patient grows a lot of his own food in his garden at home. His wife does all of the cooking, he reports they don't eat a lot of prepared or packaged food items. He admits that his wife may go heavy on the salt during meal prep.     CURRENT DIET ORDER:  Low saturated fat, Na <2400 mg     NUTRITION DIAGNOSIS:  Food- and nutrition-related knowledge deficit R/t HH diet ed as evidenced by need for HH diet education.      INTERVENTIONS:  Nutrition Prescription:  Recommended AHA Heart Healthy Diet    Implementation:   Nutrition Education (Content):  reviewed Heart Healthy Diet guidelines  provided heart healthy diet handout, low sodium diet tips and low sodium food list  Nutrition Education (Application):  Discussed current eating habits and recommended alternative food choices  Anticipate good compliance  Diet Education - refer to Education flowsheet    Goals:  Patient verbalizes understanding of diet   All of the above goals met during education session    Follow Up/Monitoring:  Provided RD contact information for future questions     Liza Boyle, MS, RD, LD  Clinical Dietitian II  Pager: [phone]

## 2024-11-15 NOTE — PLAN OF CARE
Neuro: A&O x4  Tele/cardiac: vpaced  Respiration: RA  Activity:Ind  Pain:denies  Drips: heparin @1550, Xa recheck at 2330 tonight  Drains/tubes:  none  Skin: R groin site CDI CMS intact  GI/: Continent  Aggression color: green  Isolation: enteric precautions  Plan: possible discharge tomorrow

## 2024-11-15 NOTE — PROGRESS NOTES
Infection Prevention Progress Note  11/15/2024      Patient Name: Fausto Farr [id number]  Admit Date: 11/12/2024    Infection Status as of 11/15/2024 3:27 PM: Norovirus  Isolation Status as of 11/15/2024 3:27 PM: Enteric     MDRO Discontinuation  Infection Prevention has reviewed this patient's chart per the MDRO D/C Policy and have taken the following action:    Patient meets all the criteria for discontinuation and Infection Prevention will resolve the MRSA infection status.    Contact Precautions discontinued for the following MDRO(s): MRSA    If you have any questions, please contact Infection Prevention.    Cristo Jacques, Infection Prevention

## 2024-11-16 ENCOUNTER — APPOINTMENT (OUTPATIENT)
Dept: PHYSICAL THERAPY | Facility: CLINIC | Age: 84
DRG: 322 | End: 2024-11-16
Payer: MEDICARE

## 2024-11-16 VITALS
HEART RATE: 67 BPM | TEMPERATURE: 98.3 F | OXYGEN SATURATION: 97 % | DIASTOLIC BLOOD PRESSURE: 78 MMHG | BODY MASS INDEX: 32.08 KG/M2 | SYSTOLIC BLOOD PRESSURE: 110 MMHG | RESPIRATION RATE: 18 BRPM | WEIGHT: 192.8 LBS

## 2024-11-16 LAB
ANION GAP SERPL CALCULATED.3IONS-SCNC: 10 MMOL/L (ref 7–15)
ATRIAL RATE - MUSE: 416 BPM
ATRIAL RATE - MUSE: 56 BPM
ATRIAL RATE - MUSE: 70 BPM
BUN SERPL-MCNC: 11.3 MG/DL (ref 8–23)
CALCIUM SERPL-MCNC: 8.9 MG/DL (ref 8.8–10.4)
CHLORIDE SERPL-SCNC: 101 MMOL/L (ref 98–107)
CREAT SERPL-MCNC: 0.74 MG/DL (ref 0.67–1.17)
DIASTOLIC BLOOD PRESSURE - MUSE: NORMAL MMHG
EGFRCR SERPLBLD CKD-EPI 2021: 90 ML/MIN/1.73M2
ERYTHROCYTE [DISTWIDTH] IN BLOOD BY AUTOMATED COUNT: 13.2 % (ref 10–15)
GLUCOSE SERPL-MCNC: 117 MG/DL (ref 70–99)
HCO3 SERPL-SCNC: 24 MMOL/L (ref 22–29)
HCT VFR BLD AUTO: 37.4 % (ref 40–53)
HGB BLD-MCNC: 12.7 G/DL (ref 13.3–17.7)
INR PPP: 1.37 (ref 0.85–1.15)
INTERPRETATION ECG - MUSE: NORMAL
MCH RBC QN AUTO: 29.5 PG (ref 26.5–33)
MCHC RBC AUTO-ENTMCNC: 34 G/DL (ref 31.5–36.5)
MCV RBC AUTO: 87 FL (ref 78–100)
P AXIS - MUSE: 76 DEGREES
P AXIS - MUSE: NORMAL DEGREES
P AXIS - MUSE: NORMAL DEGREES
PLATELET # BLD AUTO: 255 10E3/UL (ref 150–450)
POTASSIUM SERPL-SCNC: 3.9 MMOL/L (ref 3.4–5.3)
PR INTERVAL - MUSE: NORMAL MS
QRS DURATION - MUSE: 166 MS
QRS DURATION - MUSE: 168 MS
QRS DURATION - MUSE: 170 MS
QT - MUSE: 462 MS
QT - MUSE: 472 MS
QT - MUSE: 490 MS
QTC - MUSE: 498 MS
QTC - MUSE: 509 MS
QTC - MUSE: 543 MS
R AXIS - MUSE: -81 DEGREES
R AXIS - MUSE: 212 DEGREES
R AXIS - MUSE: 85 DEGREES
RBC # BLD AUTO: 4.3 10E6/UL (ref 4.4–5.9)
SODIUM SERPL-SCNC: 135 MMOL/L (ref 135–145)
SYSTOLIC BLOOD PRESSURE - MUSE: NORMAL MMHG
T AXIS - MUSE: -9 DEGREES
T AXIS - MUSE: 53 DEGREES
T AXIS - MUSE: 55 DEGREES
UFH PPP CHRO-ACNC: 0.58 IU/ML
UFH PPP CHRO-ACNC: 0.65 IU/ML
VENTRICULAR RATE- MUSE: 70 BPM
VENTRICULAR RATE- MUSE: 70 BPM
VENTRICULAR RATE- MUSE: 74 BPM
WBC # BLD AUTO: 8.9 10E3/UL (ref 4–11)

## 2024-11-16 PROCEDURE — 97530 THERAPEUTIC ACTIVITIES: CPT | Mod: GP

## 2024-11-16 PROCEDURE — 85041 AUTOMATED RBC COUNT: CPT | Performed by: PHYSICIAN ASSISTANT

## 2024-11-16 PROCEDURE — 80048 BASIC METABOLIC PNL TOTAL CA: CPT | Performed by: PHYSICIAN ASSISTANT

## 2024-11-16 PROCEDURE — 99239 HOSP IP/OBS DSCHRG MGMT >30: CPT | Performed by: INTERNAL MEDICINE

## 2024-11-16 PROCEDURE — 36415 COLL VENOUS BLD VENIPUNCTURE: CPT | Performed by: INTERNAL MEDICINE

## 2024-11-16 PROCEDURE — 85610 PROTHROMBIN TIME: CPT | Performed by: PHYSICIAN ASSISTANT

## 2024-11-16 PROCEDURE — 250N000013 HC RX MED GY IP 250 OP 250 PS 637: Performed by: INTERNAL MEDICINE

## 2024-11-16 PROCEDURE — 85014 HEMATOCRIT: CPT | Performed by: PHYSICIAN ASSISTANT

## 2024-11-16 PROCEDURE — 250N000011 HC RX IP 250 OP 636: Performed by: INTERNAL MEDICINE

## 2024-11-16 PROCEDURE — 250N000013 HC RX MED GY IP 250 OP 250 PS 637: Performed by: STUDENT IN AN ORGANIZED HEALTH CARE EDUCATION/TRAINING PROGRAM

## 2024-11-16 PROCEDURE — 36415 COLL VENOUS BLD VENIPUNCTURE: CPT | Performed by: PHYSICIAN ASSISTANT

## 2024-11-16 PROCEDURE — 85520 HEPARIN ASSAY: CPT | Performed by: PHYSICIAN ASSISTANT

## 2024-11-16 PROCEDURE — 97110 THERAPEUTIC EXERCISES: CPT | Mod: GP

## 2024-11-16 PROCEDURE — 99233 SBSQ HOSP IP/OBS HIGH 50: CPT | Performed by: INTERNAL MEDICINE

## 2024-11-16 PROCEDURE — 85520 HEPARIN ASSAY: CPT | Performed by: INTERNAL MEDICINE

## 2024-11-16 RX ORDER — NITROGLYCERIN 0.4 MG/1
TABLET SUBLINGUAL
Qty: 30 TABLET | Refills: 0 | Status: SHIPPED | OUTPATIENT
Start: 2024-11-16

## 2024-11-16 RX ORDER — ENOXAPARIN SODIUM 100 MG/ML
90 INJECTION SUBCUTANEOUS EVERY 12 HOURS
Qty: 12.6 ML | Refills: 0 | Status: SHIPPED | OUTPATIENT
Start: 2024-11-16 | End: 2024-11-16

## 2024-11-16 RX ORDER — CLOPIDOGREL BISULFATE 75 MG/1
75 TABLET ORAL DAILY
Qty: 90 TABLET | Refills: 3 | Status: SHIPPED | OUTPATIENT
Start: 2024-11-16

## 2024-11-16 RX ORDER — WARFARIN SODIUM 5 MG/1
7.5 TABLET ORAL DAILY
Qty: 2 TABLET | Refills: 0 | Status: SHIPPED | OUTPATIENT
Start: 2024-11-17 | End: 2024-11-16

## 2024-11-16 RX ORDER — WARFARIN SODIUM 10 MG/1
10 TABLET ORAL DAILY
Qty: 1 TABLET | Refills: 0 | Status: SHIPPED | OUTPATIENT
Start: 2024-11-16 | End: 2024-11-17

## 2024-11-16 RX ORDER — WARFARIN SODIUM 10 MG/1
10 TABLET ORAL
Status: DISCONTINUED | OUTPATIENT
Start: 2024-11-16 | End: 2024-11-16 | Stop reason: HOSPADM

## 2024-11-16 RX ORDER — WARFARIN SODIUM 5 MG/1
7.5 TABLET ORAL DAILY
Status: SHIPPED
Start: 2024-11-17

## 2024-11-16 RX ORDER — WARFARIN SODIUM 5 MG/1
TABLET ORAL
Qty: 120 TABLET | Refills: 3 | Status: SHIPPED | OUTPATIENT
Start: 2024-11-18

## 2024-11-16 RX ORDER — ENOXAPARIN SODIUM 100 MG/ML
90 INJECTION SUBCUTANEOUS EVERY 12 HOURS
Qty: 12.6 ML | Refills: 0 | Status: SHIPPED | OUTPATIENT
Start: 2024-11-16

## 2024-11-16 RX ORDER — ENOXAPARIN SODIUM 100 MG/ML
1 INJECTION SUBCUTANEOUS EVERY 24 HOURS
Status: DISCONTINUED | OUTPATIENT
Start: 2024-11-16 | End: 2024-11-16 | Stop reason: HOSPADM

## 2024-11-16 RX ORDER — ASPIRIN 81 MG/1
81 TABLET ORAL DAILY
Qty: 5 TABLET | Refills: 0 | Status: SHIPPED | OUTPATIENT
Start: 2024-11-18 | End: 2024-11-23

## 2024-11-16 RX ADMIN — ENOXAPARIN SODIUM 90 MG: 100 INJECTION SUBCUTANEOUS at 11:02

## 2024-11-16 RX ADMIN — CLOPIDOGREL BISULFATE 75 MG: 75 TABLET ORAL at 09:05

## 2024-11-16 RX ADMIN — MESALAMINE 800 MG: 800 TABLET, DELAYED RELEASE ORAL at 09:10

## 2024-11-16 RX ADMIN — ASPIRIN 81 MG: 81 TABLET, COATED ORAL at 09:05

## 2024-11-16 RX ADMIN — AMOXICILLIN AND CLAVULANATE POTASSIUM 1 TABLET: 875; 125 TABLET, FILM COATED ORAL at 09:04

## 2024-11-16 RX ADMIN — METOPROLOL SUCCINATE 75 MG: 50 TABLET, EXTENDED RELEASE ORAL at 09:05

## 2024-11-16 ASSESSMENT — ACTIVITIES OF DAILY LIVING (ADL)
ADLS_ACUITY_SCORE: 0

## 2024-11-16 NOTE — PROGRESS NOTES
[facility]    Cardiology Progress Note    Date of Admission:  11/12/2024  Reason for Consult: NSTEMI  Subjective   Mr. Farr was seen and examined on morning rounds.     Overnight: DAMARIS    Home cardiac meds include       I have reviewed the current medication list.    Objective   TELEMETRY  AsVp  INTAKE / OUTPUT   No intake or output data in the 24 hours ending 11/15/24 2118    VITAL SIGNS  Temp:  [97.4  F (36.3  C)-97.8  F (36.6  C)] 97.7  F (36.5  C)  Pulse:  [67-73] 70  Resp:  [17-18] 18  BP: (106-127)/(61-74) 106/63  SpO2:  [93 %-96 %] 96 %  0 lbs 0 oz    PHYSICAL EXAM   Constitutional: alert, cooperative, comfortable and in no acute distress  Cardiovascular: RRR, mechanical S1 and S2, MARI at RUSB, no peripheral edema, no JVD, R-CFA site with small, tender hematoma- no bleeding  Respiratory: chest symmetric, lungs clear   Neurological: AAOX4, MAEE    Data  Most Recent 3 CBC's:  Recent Labs   Lab Test 11/14/24  0555 11/13/24  1706 11/12/24  2106   WBC 6.6 5.8 9.6   HGB 12.7* 12.9* 13.3   MCV 89 88 88    233 251     Most Recent 3  BMP's:  Recent Labs   Lab Test 11/15/24  0634 11/14/24  0555 11/12/24  2106   * 138 136   POTASSIUM 3.8 4.5 4.2   CHLORIDE 101 103 102   CO2 22 27 23   BUN 13.0 15.6 16.3   CR 0.79 0.96 0.90   ANIONGAP 11 8 11   AWILDA 8.6* 8.8 9.1   * 108* 135*     Most Recent 3 BNP's:No lab results found.   Most Recent Cholesterol Panel:  Recent Labs   Lab Test 11/14/24  1739   CHOL 124   LDL 76   HDL 30*   TRIG 92       Most Recent Transthoracic Echocardiogram:  Echo result w/o MOPS: Interpretation Summary The rhythm was atrial fibrillation with paced rhythm.There is mild global hypokinesia of the left ventricle.Left ventricular systolic function is mildly reduced.The visual ejection fraction is 45-50%.The left ventricle is normal in size.There is moderate concentric left ventricular hypertrophy.There is a pacemaker lead in the right ventricle.The right  ventricle is normal in structure, function and size.There is moderate biatrial enlargement.There is a mechanical mitral valve.(21mm St.Solo)Normal prosthetic mitral valve gradients.( MDG 4 mmHg@ HR 65)There is a bi-leaflet (St. Solo) aortic mechanical prosthesis. ( 27 mm) St.Solo CHARLEE 2.45 cm2/MSG 10 mmHg/DI 0.79'/AT 50msec)The gradient is normal for this prosthetic aortic valve. Since the last study 8/3/2022 there has been interim development of atrialfibrillation and a mild decline in estimated global LV systolic performance.        Most Recent Cardiac Catheterization:  Cardiac Catheterization    Result Date: 11/15/2024  Severe multivessel obstructive coronary artery disease status post   coronary artery bypass grafting  The LIMA-LAD is now atretic/closed  Patent left radial arterial graft to distal RPDA  Severe, obstructive hemodynamically significant coronary artery disease of   the proximal-mid LAD  Successful, IVUS guided PCI of the proximal-mid LAD with deployment of a 4   x 28 mm overlapped by a 3 x 38 mm [place] frontier drug-eluting stents  Moderate nonobstructive coronary artery disease of the left circumflex  Successful, uncomplicated right femoral arterial access with hemostasis   achieved at the end of the case with 6 Khmer Angio-Seal deployment    Left Anterior Descending   Prox LAD to Dist LAD lesion is 75% stenosed. Pressure wire/iFR used. Pre adenosine administration IFR: 0.84.      First Diagonal Branch   The vessel is small.      Second Diagonal Branch   The vessel is small.      Third Diagonal Branch   The vessel is small.      Left Circumflex   Ost Cx to Mid Cx lesion is 40% stenosed.      First Obtuse Marginal Branch   1st Mrg lesion is 40% stenosed.      Right Coronary Artery   Ost RCA to Prox RCA lesion is 100% stenosed.      Graft To Dist RCA      LIMA Graft To Mid LAD   Origin to Dist Graft lesion is 100% stenosed.         Intervention     Prox LAD to Dist LAD lesion   Stent   A CATH GUIDING  BLUE YELLOW PTFE XB3.5 [id number] guide catheter was successfully placed. The GUIDEWIRE VASC 0.909VDA840QI RUNTHROUGH  crossed the lesion. The pre-interventional distal flow is decreased (NEHA 2). A STENT CORONARY REYNA SYNERGY XD MR US 3.29C85EU [id number] drug eluting stent was successfully placed. Pre-stent angioplasty was performed using a CATH BALLOON NC EMERGE 3.97F54JJ [id number] supply. An additional CATH BALLOON NC EMERGE 3.33L34SZ [id number] supply was used. . A STENT CORONARY REYNA SYNERGY XD MR US 4.78I63QK [id number] drug-eluting stent was successfully placed. Stent 2 overlaps stent 1 proximally. Post-stent angioplasty was performedusing a CATH BALLOON NC EMERGE 3.99M55GQ [id number] supply. . An additional CATH BALLOON NC EMERGE 4.59C36WB [id number] supply was used. The post-interventional distal flow is normal (ENHA 3). The intervention was successful. No complications occurred at this lesion. Pressure wire/FFR was performed on the lesion. Pressure/FFR wire supply: GW VASC OMNIWIRE J [id number] PRESSURE [id number]. FFR post intervention: 0.84.   There is a 0% residual stenosis post intervention.            Assessment & Plan   Mr. Fausto Farr is a 84 year old male with medical comorbidities including moderate-severe 3v-CAD s/p 2v-CABG (1/3/2006) w/ LIMA-LAD (atretic; 11/2024) and L radial-RCA (patent; 11/2024) w/ residual moderate oLCx/OM1 disease, critical AS s/p mechanical SAVR (22 mm Medtronic-Bobo; 1/3/2006) c/b PVL with redo mechanical SAVR (21 mm St. Solo Reagent; 10/16/2013), severe MS (dMG 25 mmHg) s/p mechanical MVR (27 mm St. Solo Master; 10/16/2013) on warfarin, CHB s/p PPM (2022), aflutter s/p CTI ablation (2019), T2DM, HTN, obesity (BMI 34), GAGE on CPAP, AAA s/p TEVAR who was admitted 11/12/2024 for nausea, diarrhea, chest discomfort. Cardiology is consulted for NSTEMI s/p REYNA x2 to p/mLAD 4 x 28 mm overlapped by a 3 x 38 mm Gabriele frontier drug-eluting  stents).     # NSTEMI s/p REYNA to p/mLAD (4 x 28 mm and 3 x 38 mm Gabriele [place] REYNA; 11/14/24)  # Moderate-severe 3v-CAD s/p 2v-CABG (1/3/2006) w/ LIMA-LAD (atretic; 11/2024) and L radial-RCA (patent; 11/2024) w/ residual moderate oLCx/OM1 disease  # Critical AS s/p mechanical SAVR (22 mm Medtronic-Bobo; 1/3/2006) c/b PVL with redo mechanical SAVR (21 mm St. Solo Reagent; 10/16/2013)  # Severe MS (dMG 25 mmHg) s/p mechanical MVR (27 mm St. Solo Master; 10/16/2013) on warfarin  # CHB s/p PPM (2022)  # Aflutter s/p CTI ablation (2019)  # T2DM  # HTN  # Obesity (BMI 34)  # GAGE on CPAP  # AAA s/p TEVAR    Mr. Farr is doing well after his REYNA to p/mLAD on 11/14. INR is 1.4 and he will need lovenox bridge to goal INR 3 (for mechanical AVR/MVR). I carefully went over the plan for quadruple therapy (aspirin, plavix, lovenox, and warfarin) with the patient and he was able to teach back.     R-CFA access site looks stable. Patient reportedly discharging today, which is reasonable.     RECOMMENDATIONS:  Continue aspirin 81 mg qday, stop date 11/21/24  Continue plavix 75 for one year, stop date 11/2025  Continue subcutaneous lovenox 95 mg BID until told by local anticoagulation clinic to stop it  Continue warfarin to goal INR 3, monitor with home anticoag clinic  Continue home zetia, metoprolol, and rosuva on d/c    Thank you for involving us in the care of this patient.  We will sign off.       Winston Smith MD  Cardiology  [facility]      ADMINISTRATIVE BILLING  I personally spent over half of a total 55 minutes face to face (F2F) with the patient in counseling and discussion and/or coordination of care as well as non-face-to-face (NF2F) time documenting in the Epic Chart, medical record review, ordering/reviewing tests, or communicating with other health care professionals as described above.

## 2024-11-16 NOTE — PLAN OF CARE
Cardiac Rehab Discharge Summary    Reason for therapy discharge:    Discharged to home with outpatient therapy.    Progress towards therapy goal(s). See goals on Care Plan in [facility] electronic health record for goal details.  Goals partially met.  Barriers to achieving goals:   discharge from facility.    Therapy recommendation(s):    Continued therapy is recommended.  Rationale/Recommendations:  cont outpatient PT for back problems and outpatient cardiac rehab for monitored progression of activity and education to promote overall heart health.

## 2024-11-16 NOTE — CONSULTS
Care Management Initial Consult    General Information  Assessment completed with: Patient, Ralph  Type of CM/SW Visit: Initial Assessment  Primary Care Provider verified and updated as needed: Yes (Jodi Flower Mai [phone])   Readmission within the last 30 days: no previous admission in last 30 days      Reason for Consult: discharge planning  Advance Care Planning:    ACP documents on file in EPIC - dated 10/9/15    Communication Assessment  Patient's communication style: spoken language (English or Bilingual)    Hearing Difficulty or Deaf: no     Cognitive  Cognitive/Neuro/Behavioral: WDL                      Living Environment:   People in home: spouse     Current living Arrangements: house ([address]   [place] [address])      Able to return to prior arrangements: yes  Living Arrangement Comments: split level home 1 step in 6 up to main floor 10 to bedroom    Family/Social Support:  Care provided by: self  Provides care for: no one  Marital Status:   Support system: Wife  Ritu  [phone]       Description of Support System: Supportive, Involved    Support Assessment: Adequate family and caregiver support    Current Resources:   Patient receiving home care services: No  Community Resources: None  Equipment currently used at home: none  Supplies currently used at home: Other (CPAP via [facility])    Employment/Financial:  Employment Status: retired     Employment/ Comments: retiredin 2007, was a   Financial Concerns:     Referral to Financial Worker: No  Finance Comments: active insurance  Does the patient's insurance plan have a 3 day qualifying hospital stay waiver?  No    Lifestyle & Psychosocial Needs:  Social Drivers of Health     Food Insecurity: Low Risk  (11/13/2024)    Food Insecurity     Within the past 12 months, did you worry that your food would run out before you got money to buy more?: No     Within the past 12 months, did the food you bought just not last and you  didn t have money to get more?: No   Depression: Not at risk (11/4/2024)    PHQ-2     PHQ-2 Score: 0   Housing Stability: Low Risk  (11/13/2024)    Housing Stability     Do you have housing? : Yes     Are you worried about losing your housing?: No   Tobacco Use: Medium Risk (11/4/2024)    Patient History     Smoking Tobacco Use: Former     Smokeless Tobacco Use: Never     Passive Exposure: Not on file   Financial Resource Strain: Low Risk  (11/13/2024)    Financial Resource Strain     Within the past 12 months, have you or your family members you live with been unable to get utilities (heat, electricity) when it was really needed?: No   Alcohol Use: Unknown (11/26/2019)    AUDIT-C     Frequency of Alcohol Consumption: 2-4 times a month     Average Number of Drinks: 1 or 2     Frequency of Binge Drinking: Not on file   Transportation Needs: Low Risk  (11/13/2024)    Transportation Needs     Within the past 12 months, has lack of transportation kept you from medical appointments, getting your medicines, non-medical meetings or appointments, work, or from getting things that you need?: No   Physical Activity: Unknown (11/4/2024)    Exercise Vital Sign     Days of Exercise per Week: Patient declined     Minutes of Exercise per Session: 20 min   Interpersonal Safety: Low Risk  (11/4/2024)    Interpersonal Safety     Do you feel physically and emotionally safe where you currently live?: Yes     Within the past 12 months, have you been hit, slapped, kicked or otherwise physically hurt by someone?: No     Within the past 12 months, have you been humiliated or emotionally abused in other ways by your partner or ex-partner?: No   Stress: No Stress Concern Present (11/4/2024)    [place] [employer] - Occupational Stress Questionnaire     Feeling of Stress : Not at all   Social Connections: Unknown (11/4/2024)    Social Connection and Isolation Panel [NHANES]     Frequency of Communication with Friends and  Family: Not on file     Frequency of Social Gatherings with Friends and Family: Twice a week     Attends Sikhism Services: Not on file     Active Member of Clubs or Organizations: Not on file     Attends Club or Organization Meetings: Not on file     Marital Status: Not on file   Health Literacy: Not on file     Functional Status:  Per PT assessment 11/15/24,   PT Discharge Planning   PT Plan Bring Stoplight Tool and Return to Activity After Heart Attack handouts; Try Nu-step due to chronic LBP   PT Discharge Recommendation (DC Rec) home with outpatient physical therapy   PT Rationale for DC Rec Patient is ambulating independently, mobility limited by chronic low back pain. He has supportive wife at home and local daughters to assist as needed. Recommend outpatient CR for continued recovery s/p NSTEMI and PCI.   PT Brief overview of current status Goals of therapy will be to address safe mobility and make recs for d/c to next level of care. Pt and RN will continue to follow all falls risk precautions as documented by RN staff while hospitalized.        Mental Health Status:  Mental Health Status: No Current Concerns       Chemical Dependency Status:  Chemical Dependency Status: No Current Concerns        Values/Beliefs:  Spiritual, Cultural Beliefs, Sikhism Practices, Values that affect care: no          Discussed  Partnership in Safe Discharge Planning  document with patient/family: No    Additional Information:  Pt lives independently with spouse in house. Has been evaluated by therapies who recommend home with OP CR--this order is in place.  Pt has [facility] PCP, [facility] task request sent for PCP followup.  Clinic care coordination referral also sent per protocol.  MD specifically requests Mon INR appointment scheduling.  KARLEY-RN scheduled Monday apt  and noted pt already has Tues apt scheduled--MD advises to keep both (done).      Appointments scheduled already in EPIC include  Nov 18, 2024 10:30 AM  INR LAB with CATHERINE  LAB  [facility] ([facility] ) [address]  [place] [address]  [phone]      Nov 19, 2024 9:15 AM  INR LAB with EA LAB  [facility] [place] [facility] ([facility] ) [address]  [address]  [place] [address]  [phone]      Nov 22, 2024 8:00 AM (Arrive by 7:45 AM)  Cardiac Evaluation with  CARDIAC REHAB 4  [facility] [place] ([facility])   [address]  [facility] [place] [facility] [address]  [phone]   Dec 19, 2024 8:00 AM (Arrive by 7:55 AM)  Return Cardiology with Calderon Santo MD  [facility] ([facility] ) [address]  [place] [facility] [place] [address] [phone]      Feb 11, 2025 12:00 AM  CARDIAC DEVICE CHECK - REMOTE with JARAMILLO TECH1  [facility] ([facility] ) [address]  [place] [address] [phone]       Next Steps:    No further care management intervention anticipated at this time.  Care management signing off.   Please re-consult if further needs arise.        Jesi Crawley RN, BSN, PHN  [facility]  Inpatient Care Management - FLOAT

## 2024-11-16 NOTE — PLAN OF CARE
[phone]     Orientation: A&O x 4  Vitals: VSS on RA, denied pain  Tele: Vpaced  Lines/Drains: IV hep gtt per protocol. Xa recheck at 0820.  Skin/Wounds: R groin site CDI  GI/: cardiac diet. BRV  Labs: Abnormal/Trends - INR 1.37. Electrolyte replacement - n/a.  Ambulation/Assist: Ind in room.  Plan: bridging to coumadin. Possible discharge.

## 2024-11-16 NOTE — PLAN OF CARE
Ralph is alert, oriented, pleasant, cooperative. VSS on room air. Denies chest pain, nausea, dyspnea. No abdominal discomfort. Right groin site is bruised, soft. Distal CMS intact. Up ad claudette independently. He is discharging to home with Lovenox bridge. Ralph was able to safely self-administer his Lovenox dose before discharge. He will have his INR checked Monday and Tuesday next week. Discharge instructions reviewed with patient and daughter.

## 2024-11-16 NOTE — DISCHARGE SUMMARY
[facility]    Discharge Summary  Hospitalist    Date of Admission:  11/12/2024  Date of Discharge:  11/16/2024  Discharging Provider: Kerwin Ramos MD    Discharge Diagnoses        NSTEMI, s/p PCI x2 REYNA to LAD 11/14/24  New cardiomyopathy EF decreased from 55-60 -->45-50%  Coronary artery disease with CABG (LIMA to LAD, radial grafting to RCA) 1990s  Atypical atrial flutter and atrial fibrillation s/p DCCV 5/2024  Complete heart block s/p PPM  S/p mechanical AVR 2006 with redo 2013 for perivalvular leak  S/p mechanical MVR 2013  Coagulopathy secondary to warfarin   Norovirus gastroenteritis-resolved   S/p endovascular AAA repair with persistent endoleak  Obstructive sleep apnea   Hyperlipidemia   Ulcerative colitis  BPH  Hx of septic arthritis       Hospital Course:    Fausto Farr is a 84 year-old male with past medical history significant for coronary artery disease with prior CABG, mechanical aortic and mitral valve and afib/flutter, CHB s/p PPM, HLD, GAGE on CPAP who was registered to observation on 11/12/2024 with chest pain. Lexiscan resulted abnormal and cardiology consulted, he was flipped to inpatient status and cardiology recommended coronary angiogram.      NSTEMI, s/p PCI x2 REYNA to LAD 11/14/24  New cardiomyopathy EF decreased from 55-60 -->45-50%  Coronary artery disease with prior 2v CABG (LIMA to LAD, radial grafting to RCA) 1990s  Atypical atrial flutter and atrial fibrillation s/p DCCV 5/2024  Complete heart block s/p PPM  S/p mechanical AVR 2006 with redo 2013 for perivalvular leak  S/p mechanical MVR 2013  Coagulopathy secondary to warfarin   *Presented with central chest ache, non-exertional, followed by n/v/d (see below). EKG V-paced rhythm with frequent PVCs. Troponin 38->35. Last stress test (NM Lexiscan) 4/2019 negative for ischemia. Recent ECHO noted as below, EF has decreased from 55-60%  in 2022 to 45-40%, found to be in Afib for past 5-6 months on recent  device check, since cardioversion 5/2024.   *Lexiscan abnormal, TID is absent,  medium sized area of a moderate degree of nontransmural infarction in the apical and inferior segment(s) of the LV associated with a small area of a mild degree of navin-infarct ischemia. LV function is mildly reduced. LVEF at rest is 43% and 44% with stress. Prior study was conducted on 4/18/2019. No change when compared with the prior study.  *Cardiology consulted.  Patient underwent coronary angiogram on 11/14/2024  And is s/p PCI to the proximal/mid LAD with 2 drug-eluting stents on 11/14/2024. LIMA graft is atretic whereas the radial graft is patent. Overnight heparin drip was placed on hold due to right groin bleeding and resumed the following morning.  No further issues.  -Discussed with cardiology, plan is to discharge him on 1 week of aspirin, at least a year of Plavix.  He will also continue Coumadin.  Recommend Coumadin 10 mg tonight and 7.5 mg on 11/17/2024.  Subsequent dosing starting 11/18/2024 to be decided based on INR and directed by his Coumadin clinic.  Next INR check on 11/18/2024.  Lovenox will be added as a bridge as his INR is still sub-therapeutic.  Lovenox should be discontinued once INR is greater than 2.5.  -Continue Crestor and metoprolol  -Cardiac rehab - home with outpatient PT     Norovirus gastroenteritis-resolved  Nausea, vomiting, diarrhea, improved  -Presented with initial symptoms of lower abdominal comfort and later 3-4 episodes of nausea and vomiting, 4 episodes of watery diarrhea. Ate at [employer] day prior to symptom onset, family ate similar meals without symptoms. 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 +Norovirus on enteric panel.   -Symptoms improving.  Stool starting to form better.    S/p endovascular AAA repair with persistent endoleak  Slightly increased on CTA compared to 8/2024. Follows with interventional radiology with serial monitoring. Unlikely contributing to acute symptoms.      Obstructive sleep apnea: Continue CPAP per home settings     Hyperlipidemia: Continue ezetimibe, rosuvastatin       Ulcerative colitis  Reports symptoms recently stable prior to diarrhea above.   -Continue prior to admission mesalamine      BPH: Continue prior to admission tamsulosin     Hx of septic arthritis: Continue prior to admission amoxicillin-clavulanate PO       Kerwin Ramos MD    Significant Results and Procedures   See below    Pending Results     Unresulted Labs Ordered in the Past 30 Days of this Admission       No orders found from 10/13/2024 to 11/13/2024.            Code Status   Full Code       Primary Care Physician   Chris Flower    Physical Exam   Temp: 98.3  F (36.8  C) Temp src: Oral BP: 110/78 Pulse: 67   Resp: 18 SpO2: 97 % O2 Device: None (Room air)      Constitutional: AAOX3, NAD  Respiratory: CTA B/L, Normal WOB  Cardiovascular: RRR, No murmur  GI: Soft, Non- tender, BS- normoactive  MSK: no edema  Neuro: CN- grossly intact, moving all 4 extremities.     Discharge Disposition   Discharged to home  Condition at discharge: Stable    Consultations This Hospital Stay   CARDIOLOGY IP CONSULT  PHARMACY IP CONSULT  HOSPITALIST IP CONSULT  NUTRITION SERVICES ADULT IP CONSULT  CARDIAC REHAB IP CONSULT  PHARMACY TO DOSE WARFARIN  CARE MANAGEMENT / SOCIAL WORK IP CONSULT  SMOKING CESSATION PROGRAM IP CONSULT    Time Spent on this Encounter   IKerwin MD, personally saw the patient today and spent greater than 30 minutes discharging this patient.    Discharge Orders      Primary Care - Care Coordination Referral      Cardiac Rehab  Referral      ANTICOAGULATION CLINIC  REFERRAL      Physical Therapy  Referral      Reason aspirin not prescribed from this order set     Reason Lipid Lowering Medications not prescribed from this order set     Reason for your hospital stay    Non-ST elevation MI     Follow-up and recommended labs and tests     Follow up with primary care provider, Chris Flower, within 7 days for hospital follow- up.    Cardiology in 1 week     Activity    Your activity upon discharge: activity as tolerated     Diet    Follow this diet upon discharge: Current Diet:Orders Placed This Encounter      Low Saturated Fat Na <2400 mg     Discharge Medications   Current Discharge Medication List        START taking these medications    Details   aspirin 81 MG EC tablet Take 1 tablet (81 mg) by mouth daily for 5 days.  Qty: 5 tablet, Refills: 0    Associated Diagnoses: NSTEMI (non-ST elevated myocardial infarction) (H)      clopidogrel (PLAVIX) 75 MG tablet Take 1 tablet (75 mg) by mouth daily. Dose to start tomorrow.  Qty: 90 tablet, Refills: 3    Associated Diagnoses: Chronic systolic congestive heart failure (H)      nitroGLYcerin (NITROSTAT) 0.4 MG sublingual tablet For chest pain place 1 tablet under the tongue every 5 minutes for 3 doses. If symptoms persist 5 minutes after 1st dose call 911.  Qty: 30 tablet, Refills: 0    Associated Diagnoses: NSTEMI (non-ST elevated myocardial infarction) (H)           CONTINUE these medications which have CHANGED    Details   enoxaparin ANTICOAGULANT (LOVENOX) 100 MG/ML syringe Inject 0.9 mLs (90 mg) subcutaneously every 12 hours.  Qty: 12.6 mL, Refills: 0    Comments: Discontinue once INR greater than 2.5 or per advice from anticoagulation clinic  Associated Diagnoses: S/P mitral valve replacement; Longstanding persistent atrial fibrillation (H)      !! warfarin ANTICOAGULANT (COUMADIN) 10 MG tablet Take 1 tablet (10 mg) by mouth daily for 1 day.  Qty: 1 tablet, Refills: 0    Associated Diagnoses: S/P mitral valve  replacement      !! warfarin ANTICOAGULANT (COUMADIN) 5 MG tablet Take 1 tablet (5mg) every Mon,Wed & Fri / Take 1 1/2 tablets (7.5mg) all other days OR per INR clinic  Qty: 120 tablet, Refills: 3    Associated Diagnoses: S/P mitral valve replacement; S/P aortic valve replacement; Persistent atrial fibrillation (H)      !! warfarin ANTICOAGULANT (COUMADIN) 5 MG tablet Take 1.5 tablets (7.5 mg) by mouth daily.    Associated Diagnoses: S/P mitral valve replacement       !! - Potential duplicate medications found. Please discuss with provider.        CONTINUE these medications which have NOT CHANGED    Details   acetaminophen (TYLENOL) 325 MG tablet Take 650 mg by mouth 2 times daily.      amoxicillin-clavulanate (AUGMENTIN) 875-125 MG tablet Take 1 tablet by mouth daily.  Qty: 90 tablet, Refills: 3    Associated Diagnoses: History of septic arthritis      cholecalciferol 25 MCG (1000 UT) TABS Take 1,000 Units by mouth daily      ezetimibe (ZETIA) 10 MG tablet Take 1 tablet (10 mg) by mouth daily.  Qty: 100 tablet, Refills: 3    Associated Diagnoses: Mixed hyperlipidemia      ferrous sulfate (FEROSUL) 325 (65 Fe) MG tablet Take 325 mg by mouth daily (with breakfast)      fluticasone (FLONASE) 50 MCG/ACT nasal spray Spray 1 spray into both nostrils daily.  Qty: 16 g, Refills: 0    Associated Diagnoses: Sinusitis, unspecified chronicity, unspecified location      glucosamine-chondroitin 500-400 MG CAPS per capsule Take 1 capsule by mouth 2 times daily.      mesalamine (ASACOL HD) 800 MG EC tablet Take 800 mg by mouth 2 times daily.      metoprolol succinate ER (TOPROL XL) 50 MG 24 hr tablet Take 1 1/2 tab (75mg) daily  Qty: 135 tablet, Refills: 3    Associated Diagnoses: NSVT (nonsustained ventricular tachycardia) (H); Coronary artery disease without angina pectoris, unspecified vessel or lesion type, unspecified whether native or transplanted heart; Chronic diastolic congestive heart failure (H)      rosuvastatin  (CRESTOR) 40 MG tablet Take 1 tablet (40 mg) by mouth daily.  Qty: 100 tablet, Refills: 3    Associated Diagnoses: Mixed hyperlipidemia      tamsulosin (FLOMAX) 0.4 MG capsule Take 1 capsule (0.4 mg) by mouth daily.  Qty: 90 capsule, Refills: 3    Associated Diagnoses: Urinary retention           Allergies   Allergies   Allergen Reactions    Bees Anaphylaxis     Data   Most Recent 3 CBC's:  Recent Labs   Lab Test 11/16/24  0543 11/14/24  0555 11/13/24  1706   WBC 8.9 6.6 5.8   HGB 12.7* 12.7* 12.9*   MCV 87 89 88    248 233      Most Recent 3 BMP's:  Recent Labs   Lab Test 11/16/24  0543 11/15/24  0634 11/14/24  0555    134* 138   POTASSIUM 3.9 3.8 4.5   CHLORIDE 101 101 103   CO2 24 22 27   BUN 11.3 13.0 15.6   CR 0.74 0.79 0.96   ANIONGAP 10 11 8   AWILDA 8.9 8.6* 8.8   * 106* 108*     Most Recent 2 LFT's:  Recent Labs   Lab Test 11/12/24  2106 07/25/24  1047 08/01/23  0917   AST 36  --  33   ALT 30 28 28   ALKPHOS 60  --  57   BILITOTAL 0.3  --  0.2     Most Recent INR's and Anticoagulation Dosing History:  Anticoagulation Dose History  More data exists         Latest Ref Rng & Units 10/22/2024 10/29/2024 11/4/2024 11/13/2024 11/14/2024 11/15/2024 11/16/2024   Recent Dosing and Labs   warfarin ANTICOAGULANT (COUMADIN) 10 MG tablet - - - - - - 10 mg, $Given -   INR 0.85 - 1.15 3.5  3.9  2.3  2.33  1.82  1.35  1.39  1.37       Details          Multiple values from one day are sorted in reverse-chronological order             Most Recent 3 Troponin's:  Recent Labs   Lab Test 05/18/18  0550 05/18/18  0200   TROPI 0.029 0.042     Most Recent Cholesterol Panel:  Recent Labs   Lab Test 11/14/24  1739   CHOL 124   LDL 76   HDL 30*   TRIG 92     Most Recent 6 Bacteria Isolates From Any Culture (See EPIC Reports for Culture Details):  Recent Labs   Lab Test 02/12/20  1033 02/12/20  1032 02/12/20  1029 02/10/20  1516 02/10/20  1510 02/03/20  1250   CULT Light growth  Bacteroides vulgatus  Susceptibility  testing done on previous specimen  *  Light growth  Cutibacterium (Propionibacterium) acnes  Susceptibility testing not routinely done  *  Light growth  Finegoldia magna (Peptostreptococcus de)  Susceptibility testing not routinely done  *  Light growth  Haemophilus parainfluenzae  Susceptibility testing done on previous specimen  *  On day 2, isolated in broth only:  Staphylococcus aureus  Susceptibility testing done on previous specimen  * Moderate growth  Bacteroides vulgatus  *  Light growth  Finegoldia magna (Peptostreptococcus de)  *  Light growth  Haemophilus parainfluenzae  Susceptibility testing done on previous specimen  *  Critical Value/Significant Value, preliminary result only, called to and read back by  Liana Wolf RN from [facility]. 2/13/20 at 0958.[id number].   Light growth  Bacteroides vulgatus  Susceptibility testing done on previous specimen  *  Moderate growth  Finegoldia magna (Peptostreptococcus de)  Susceptibility testing not routinely done  *  Light growth  Staphylococcus aureus  *  Light growth  Haemophilus parainfluenzae  *  Light growth  Staphylococcus lugdunensis  * No growth No growth Moderate growth  Peptoniphilus asaccharolyticus  Susceptibility testing not routinely done  *  Heavy growth  Staphylococcus aureus  *  Heavy growth  Streptococcus agalactiae sero group B  *  Heavy growth  Corynebacterium striatum  Identification obtained by MALDI-TOF mass spectrometry research use only database. Test   characteristics determined and verified by the [facility]   ([facility]) [place], [place].  Susceptibility testing not routinely done  *     Most Recent TSH, T4 and A1c Labs:  Recent Labs   Lab Test 09/30/24  1041 02/16/24  0907 10/12/23  0929   TSH  --   --  1.94   A1C 6.9*   < >  --     < > = values in this interval not displayed.       Results for orders placed or performed during the hospital encounter of 11/12/24   Cardiac  Catheterization    Narrative    Severe multivessel obstructive coronary artery disease status post   coronary artery bypass grafting  The LIMA-LAD is now atretic/closed  Patent left radial arterial graft to distal RPDA  Severe, obstructive hemodynamically significant coronary artery disease of   the proximal-mid LAD  Successful, IVUS guided PCI of the proximal-mid LAD with deployment of a 4   x 28 mm overlapped by a 3 x 38 mm [place] frontier drug-eluting stents  Moderate nonobstructive coronary artery disease of the left circumflex  Successful, uncomplicated right femoral arterial access with hemostasis   achieved at the end of the case with 6 Kinyarwanda Angio-Seal deployment     NM Lexiscan stress test (nuc card)     Value    Target     Baseline Systolic     Baseline Diastolic BP 68    Last Stress Systolic     Last Stress Diastolic BP 59    Baseline HR 72    Max HR  71    Max Predicted HR  52    Rate Pressure Product 8,023.0    BP 43    Left Ventricular EF 44    Narrative      The nuclear stress test is abnormal.    TID is absent.    There is a medium sized area of a moderate degree of nontransmural   infarction in the apical and inferior segment(s) of the left ventricle   associated with a small area of a mild degree of navin-infarct ischemia.    Left ventricular function is mildly reduced.    The left ventricular ejection fraction at rest is 43%.  The left   ventricular ejection fraction at stress is 44%.    A prior study was conducted on 4/18/2019.  This study has no change   when compared with the prior study.       *Note: Due to a large number of results and/or encounters for the requested time period, some results have not been displayed. A complete set of results can be found in Results Review.

## 2024-11-18 ENCOUNTER — DOCUMENTATION ONLY (OUTPATIENT)
Dept: ANTICOAGULATION | Facility: CLINIC | Age: 84
End: 2024-11-18

## 2024-11-18 ENCOUNTER — TELEPHONE (OUTPATIENT)
Dept: CARDIOLOGY | Facility: CLINIC | Age: 84
End: 2024-11-18

## 2024-11-18 ENCOUNTER — LAB (OUTPATIENT)
Dept: LAB | Facility: CLINIC | Age: 84
End: 2024-11-18
Payer: MEDICARE

## 2024-11-18 ENCOUNTER — ANTICOAGULATION THERAPY VISIT (OUTPATIENT)
Dept: ANTICOAGULATION | Facility: CLINIC | Age: 84
End: 2024-11-18

## 2024-11-18 ENCOUNTER — PATIENT OUTREACH (OUTPATIENT)
Dept: CARE COORDINATION | Facility: CLINIC | Age: 84
End: 2024-11-18

## 2024-11-18 DIAGNOSIS — Z79.01 LONG TERM CURRENT USE OF ANTICOAGULANT THERAPY: ICD-10-CM

## 2024-11-18 DIAGNOSIS — I48.11 LONGSTANDING PERSISTENT ATRIAL FIBRILLATION (H): ICD-10-CM

## 2024-11-18 DIAGNOSIS — Z95.2 S/P MITRAL VALVE REPLACEMENT: Primary | ICD-10-CM

## 2024-11-18 DIAGNOSIS — Z95.2 S/P AORTIC VALVE REPLACEMENT: ICD-10-CM

## 2024-11-18 DIAGNOSIS — Z95.2 S/P MITRAL VALVE REPLACEMENT: ICD-10-CM

## 2024-11-18 LAB — INR BLD: 3.4 (ref 0.9–1.1)

## 2024-11-18 PROCEDURE — 85610 PROTHROMBIN TIME: CPT

## 2024-11-18 PROCEDURE — 36416 COLLJ CAPILLARY BLOOD SPEC: CPT

## 2024-11-18 NOTE — PROGRESS NOTES
Clinic Care Coordination Contact  [facility]/Voicemail    Clinical Data: Care Coordinator Outreach    Outreach Documentation Number of Outreach Attempt   11/18/2024   9:30 AM 1     Left message on patient's voicemail with call back information and requested return call.    Plan: Care Coordinator will try to reach patient again in 1-2 business days.    Rose Ling, RN Care Coordinator  [facility] [place]  Email: [email or web address]  Phone: [phone]

## 2024-11-18 NOTE — TELEPHONE ENCOUNTER
Patient was admitted to [facility] on 11/12/24 who was evaluated in urgent care for nausea, diarrhea and chest pain. Troponin was elevated and recommendation was to seek evaluation in the emergency department.     PMH: coronary artery disease with prior CABG, mechanical aortic and mitral valve and afib/flutter, CHB s/p PPM, HLD, GAGE on CPAP.    Most recent echocardiogram from 11/6/2024 noted a decrease in LV systolic function from 55-60% in 2022 to 45-50%.     11/13/24: NM Lexiscan was noted to be abnormal with a small area of navin-infarct ischemia.     11/14: Coronary angiogram via RFA resulted in:    Severe multivessel obstructive coronary artery disease status post coronary artery bypass grafting:  The LIMA-LAD is now atretic/closed.  Patent left radial arterial graft to distal RPDA.  Severe, obstructive hemodynamically significant coronary artery disease of the proximal-mid LAD:  Successful, IVUS guided PCI of the proximal-mid LAD with deployment of a 4 x 28 mm overlapped by a 3 x 38 mm [place] frontier drug-eluting stents.  Moderate nonobstructive coronary artery disease of the left circumflex.  Successful, uncomplicated right femoral arterial access with hemostasis achieved at the end of the case with 6 Nepali Angio-Seal deployment.    Plan for triple therapy (Warfarin/Lovenox bridge, ASA and Plavix) for 1 week and discontinue aspirin at that point.     Pt was started on NTG and TAPT as above.    Pt is scheduled for an OV on 12/19/24 at 0755 with Dr. Santo at our [place] Office.    Cardiac rehab is scheduled on 11/22/24 at 0745 in [place].    Writer attempted to call pt for a cardiology post discharge phone call, but no answer. VM left to return my call. ARABELLA Gibbs RN.

## 2024-11-18 NOTE — PROGRESS NOTES
ANTICOAGULATION  MANAGEMENT: Discharge Review    Fausto [facility] chart reviewed for anticoagulation continuity of care    Hospital Admission on 11/12-11/16/24 for NSTEMI; new cardiomyopathy    Discharge disposition: Home    Results:    Recent labs: (last 7 days)     11/13/24  0315 11/13/24  2258 11/14/24  0555 11/14/24  1455 11/15/24  0634 11/15/24  0830 11/15/24  1652 11/16/24  0020 11/16/24  0543 11/16/24  0834   INR 2.33*  --  1.82*  --  1.39* 1.35*  --   --  1.37*  --    AAUFH  --  0.23 0.46 0.50  --  <0.10 0.49 0.65  --  0.58     Anticoagulation inpatient management:     held warfarin due to angiogram and 2 stents  and anticoagulation calendar updated with inpatient dosing    Anticoagulation discharge instructions:     Warfarin dosing: increase dose to see calendar   Bridging: bridging with enoxaparin (Lovenox)--per discharge summary, discontinue lovenox when INR is > than 2.5.   INR goal change: No      Medication changes affecting anticoagulation: Yes: aspirin x 1 week; plavix x 1 year.  I believe the augmentin is chronic medication for septic arthritis.    Additional factors affecting anticoagulation: Yes: 3 day warfarin hold; potential pain/inflammation post procedures noted above; reduced ejection fraction     PLAN     No adjustment to anticoagulation plan needed    Recommended follow up is scheduled  Patient not contacted    Anticoagulation Calendar updated    Aure Sampson RN  11/18/2024  Anticoagulation Clinic  [employer] for routing messages: joel DOWELL  [facility] patient phone line: [phone]

## 2024-11-18 NOTE — PROGRESS NOTES
"ANTICOAGULATION MANAGEMENT     Fausto Farr 84 year old male is on warfarin with therapeutic INR result. (Goal INR 2.5-3.5)    Recent labs: (last 7 days)     11/18/24  1025   INR 3.4*       ASSESSMENT     Source(s): Chart Review and Patient/Caregiver Call     Warfarin doses taken:  See calendar for dosing specifics. No warfarin 1/12-11/14, then 10 mg x2 11/15 and 11/16. Consulted [facility] for rapid rise from 11/16 to 11/18 with 2 small boosters.   Diet: No new diet changes identified - had N/V/D reported in the ED but Ralph stated they thought he had norovirus which is partly why he went into the ED originally.   Medication/supplement changes:  Yes, ASA x1 week and plavix x1 year. Has been bridging with lovenox as well. Nitro PRN.    New illness, injury, or hospitalization: Yes: Hospitalized 11/12-11/16 for NSTEMI, 2 stents placed. Had significant LAD blockage. Ralph says he learned all about the \" maker\" and is thankful he went in for emergent care.   Signs or symptoms of bleeding or clotting: No, nothing noted since discharge  Previous result: Subtherapeutic  Additional findings: Advising 5 mg dose tonight (effectively switching Sun/Mon doses this week)       PLAN     Recommended plan for temporary change(s) affecting INR     Dosing Instructions: take 5 mg tonight then Continue your current warfarin dose Stop bridging with Enoxaparin with next INR in 3 days       Summary  As of 11/18/2024      Full warfarin instructions:  11/18: 5 mg; Otherwise 7.5 mg every Mon, Wed, Fri; 5 mg all other days   Next INR check:  11/21/2024               Telephone call with Ralph and wife, Ritu, who verbalizes understanding and agrees to plan    Lab visit scheduled    Education provided: Please call back if any changes to your diet, medications or how you've been taking warfarin  Symptom monitoring: monitoring for clotting signs and symptoms and monitoring for stroke signs and symptoms  Importance of notifying anticoagulation " clinic for: changes in medications; a sooner lab recheck maybe needed and diarrhea, nausea/vomiting, reduced intake, cold/flu, and/or infections; a sooner lab recheck maybe needed    Plan made with [facility] Pharmacist Irma Rios RN  11/18/2024  Anticoagulation Clinic  [employer] for routing messages: joel DOWELL  [facility] patient phone line: [phone]        _______________________________________________________________________     Anticoagulation Episode Summary       Current INR goal:  2.5-3.5   TTR:  40.2% (1 y)   Target end date:  Indefinite   Send INR reminders to:  SHANNA DOWELL    Indications    S/P mitral valve replacement [Z95.2]  Long term current use of anticoagulant therapy [Z79.01]  Longstanding persistent atrial fibrillation (H) [I48.11]             Comments:  Per 9/20/22 Cardio Note strict INR goal of 2.5-3.5. If INR falls below 2.5 at any time, needs to be bridged with Lovenox. consent to leave DVM              Anticoagulation Care Providers       Provider Role Specialty Phone number    Jodi Flower Mai, MD Referring Internal Medicine - Pediatrics [phone]    Kerwin Ramos MD Referring Internal Medicine [phone]

## 2024-11-19 ENCOUNTER — TELEPHONE (OUTPATIENT)
Dept: CARE COORDINATION | Facility: CLINIC | Age: 84
End: 2024-11-19

## 2024-11-19 DIAGNOSIS — J32.9 SINUSITIS, UNSPECIFIED CHRONICITY, UNSPECIFIED LOCATION: ICD-10-CM

## 2024-11-19 NOTE — TELEPHONE ENCOUNTER
Please call patient to assist with scheduling provider hospital discharge follow up appointment. Note: appointment is currently scheduled with Primary Care Provider for 12/12/24, however After Visit Summary discharge recommends patient follow up with provider within 1 week. Patient okay to see another provider if Primary Care Provider does not have any sooner openings.     Thank you,     Rose Ling RN Care Coordinator  [facility] [place]  Email: [email or web address]  Phone: [phone]

## 2024-11-19 NOTE — TELEPHONE ENCOUNTER
Writer returned pt's  phone message.    Called patient to discuss any post hospital d/c questions, review medication changes, and confirm f/u appts. Patient denied any questions regarding new medications or changes to PTA medications.     RN confirmed with patient that he was d/c with an adequate supply of the antiplatelet Plavix, and reminded of importance of taking without interruption. Pt understands TAPT plan of care as below, and will stop ASA after 7 days.    Pt has an Rx for PRN SL Nitroglycerin.     Patient denied any SOB, chest pain or light headedness.    RFA cardiac cath site is without bleeding, swelling, redness or signs of infection.     RN confirmed with patient that he is scheduled for an OV on 12/19/24 at 0755 with Dr. Santo at our [place] Office. Dr. Santo' Team RN phone number provided.     Cardiac rehab is scheduled on 11/22/24 at 0745 in [place].    Patient advised to call clinic with any cardiac related questions or concerns prior to this gretta't. Patient verbalized understanding and agreed with plan. ARABELLA Gibbs RN.

## 2024-11-19 NOTE — PROGRESS NOTES
"Clinic Care Coordination Contact  Transitions of Care Outreach  Chief Complaint   Patient presents with    Clinic Care Coordination - Post Hospital    Clinic Care Coordination - Initial     Most Recent Admission Date: 11/12/2024   Most Recent Admission Diagnosis: Chest pain, unspecified type - R07.9   Most Recent Discharge Date: 11/16/2024   Most Recent Discharge Diagnosis: Chest pain, unspecified type - R07.9  Sinusitis, unspecified chronicity, unspecified location - J32.9  Abnormal stress test - R94.39  Chronic systolic congestive heart failure (H) - I50.22  S/P mitral valve replacement - Z95.2  S/P aortic valve replacement - Z95.2  Persistent atrial fibrillation (H) - I48.19  Longstanding persistent atrial fibrillation (H) - I48.11  NSTEMI (non-ST elevated myocardial infarction) (H) - I21.4     Transitions of Care Assessment  Discharge Assessment  How are you doing now that you are home?: \"feeling pretty good, still a little tired & weak\"  How are your symptoms? (Red Flag symptoms escalate to triage hotline per guidelines): Improved  Do you know how to contact your clinic care team if you have future questions or changes to your health status? : Yes  Does the patient have their discharge instructions? : Yes  Does the patient have questions regarding their discharge instructions? : No  Were you started on any new medications or were there changes to any of your previous medications? : Yes  Does the patient have all of their medications?: Yes  Do you have questions regarding any of your medications? : No  Do you have all of your needed medical supplies or equipment (DME)?  (i.e. oxygen tank, CPAP, cane, etc.): Yes         Post-op (Clinicians Only)  Did the patient have surgery or a procedure: Yes (angiogram)  Incision: closed  Drainage: No  Bleeding: none  Fever: No  Chills: No  Redness: No  Warmth: No  Swelling: No  Incision site pain: No  Closure: none  Eating & Drinking: eating and drinking without " complaints/concerns  Bowel Function: normal  Urinary Status: voiding without complaint/concerns    Follow up Plan   Discharge Follow-Up  Discharge follow up appointment scheduled in alignment with recommended follow up timeframe or Transitions of Risk Category? (Low = within 30 days; Moderate= within 14 days; High= within 7 days): No  Patient's follow up appointment not scheduled: Patient accepted scheduling support. Appt scheduled/requested per protocol.    Future Appointments   Date Time Provider Department Center   11/21/2024 10:15 AM EA LAB [facility]    11/22/2024  8:00 AM  CARDIAC REHAB 4 CR [facility]   12/12/2024  2:30 PM Jodi Flower Mai, MD EAFP    12/19/2024  8:00 AM Calderon Santo MD [facility]   2/11/2025 12:00 [facility]     Outpatient Plan as outlined on AVS reviewed with patient.    For any urgent concerns, please contact our 24 hour nurse triage line: [phone] ([phone])       Rose Ling RN

## 2024-11-20 NOTE — TELEPHONE ENCOUNTER
Called and spoke to patient.  - scheduled him with Imelda Eugene for Thursday, 11/21 @ 9:10 am.    Kept December for now, will cancel if not needed after seeing Imelda.    Rochelle PIERSON, - [place] [id number]  Primary Care- [facility] [place] [facility]

## 2024-11-21 ENCOUNTER — ANTICOAGULATION THERAPY VISIT (OUTPATIENT)
Dept: ANTICOAGULATION | Facility: CLINIC | Age: 84
End: 2024-11-21

## 2024-11-21 ENCOUNTER — OFFICE VISIT (OUTPATIENT)
Dept: PEDIATRICS | Facility: CLINIC | Age: 84
End: 2024-11-21
Payer: MEDICARE

## 2024-11-21 ENCOUNTER — LAB (OUTPATIENT)
Dept: LAB | Facility: CLINIC | Age: 84
End: 2024-11-21
Payer: MEDICARE

## 2024-11-21 VITALS
WEIGHT: 198.3 LBS | RESPIRATION RATE: 16 BRPM | DIASTOLIC BLOOD PRESSURE: 68 MMHG | HEIGHT: 65 IN | SYSTOLIC BLOOD PRESSURE: 112 MMHG | BODY MASS INDEX: 33.04 KG/M2 | TEMPERATURE: 97.1 F | HEART RATE: 70 BPM | OXYGEN SATURATION: 98 %

## 2024-11-21 DIAGNOSIS — A08.11 NOROVIRUS: ICD-10-CM

## 2024-11-21 DIAGNOSIS — Z95.5 STATUS POST INSERTION OF DRUG-ELUTING STENT INTO LEFT ANTERIOR DESCENDING (LAD) ARTERY: ICD-10-CM

## 2024-11-21 DIAGNOSIS — I48.91 ATRIAL FIBRILLATION, UNSPECIFIED TYPE (H): ICD-10-CM

## 2024-11-21 DIAGNOSIS — Z95.2 S/P AORTIC VALVE REPLACEMENT: ICD-10-CM

## 2024-11-21 DIAGNOSIS — Z79.01 LONG TERM CURRENT USE OF ANTICOAGULANT THERAPY: ICD-10-CM

## 2024-11-21 DIAGNOSIS — Z95.2 S/P MITRAL VALVE REPLACEMENT: ICD-10-CM

## 2024-11-21 DIAGNOSIS — I25.10 CORONARY ARTERY DISEASE WITHOUT ANGINA PECTORIS, UNSPECIFIED VESSEL OR LESION TYPE, UNSPECIFIED WHETHER NATIVE OR TRANSPLANTED HEART: ICD-10-CM

## 2024-11-21 DIAGNOSIS — Z09 HOSPITAL DISCHARGE FOLLOW-UP: Primary | ICD-10-CM

## 2024-11-21 DIAGNOSIS — I48.11 LONGSTANDING PERSISTENT ATRIAL FIBRILLATION (H): ICD-10-CM

## 2024-11-21 DIAGNOSIS — I21.4 NSTEMI (NON-ST ELEVATED MYOCARDIAL INFARCTION) (H): ICD-10-CM

## 2024-11-21 DIAGNOSIS — Z95.2 S/P MITRAL VALVE REPLACEMENT: Primary | ICD-10-CM

## 2024-11-21 LAB — INR BLD: 3.3 (ref 0.9–1.1)

## 2024-11-21 PROCEDURE — 99495 TRANSJ CARE MGMT MOD F2F 14D: CPT | Performed by: PHYSICIAN ASSISTANT

## 2024-11-21 RX ORDER — FLUTICASONE PROPIONATE 50 MCG
1 SPRAY, SUSPENSION (ML) NASAL DAILY
Qty: 16 G | Refills: 0 | Status: SHIPPED | OUTPATIENT
Start: 2024-11-21

## 2024-11-21 ASSESSMENT — PAIN SCALES - GENERAL: PAINLEVEL_OUTOF10: NO PAIN (0)

## 2024-11-21 NOTE — PROGRESS NOTES
{PROVIDER CHARTING PREFERENCE:353080}    Subjective   Ralph is a 84 year old, presenting for the following health issues:  Hospital F/U        11/21/2024     9:15 AM   Additional Questions   Roomed by Dianelys   Accompanied by self         11/21/2024     9:15 AM   Patient Reported Additional Medications   Patient reports taking the following new medications no     HPI      Hospital Follow-up Visit:    Hospital/Nursing Home/IP Rehab Facility: [facility]  Date of Admission: 11/12/24  Date of Discharge: 11/16/24  Reason(s) for Admission: chest pain, abnormal stress test, sinusitis, chest pain  Was the patient in the ICU or did the patient experience delirium during hospitalization?  No  Do you have any other stressors you would like to discuss with your provider? No    Problems taking medications regularly:  None  Medication changes since discharge: None  Problems adhering to non-medication therapy:  None    Summary of hospitalization:  [facility] discharge summary reviewed  Diagnostic Tests/Treatments reviewed.  Follow up needed: INR clinic  Other Healthcare Providers Involved in Patient s Care:         Specialist appointment - Cardiology Rehab  tomorrow; Follow-up with cardiology in 1 month Dr. Santo  Update since discharge: improved. {TIP  Include information from family/caregivers, SNF, Care Coordination :314248}    Plan of care communicated with patient     Less fatigue, mild occasional chest pain. No chest pain     {Reference  Coding guidelines- Moderate Complexity F2F/Video within 7 - 14 days of discharge 7349919, High Complexity F2F/Video within 7 days 3320384 or shajuo94 days 1154529 :444967}    {additonal problems for provider to add (Optional):164437}  Review of Systems  Constitutional, HEENT, cardiovascular, pulmonary, gi and gu systems are negative, except as otherwise noted.      Objective    /68 (BP Location: Right arm, Patient Position: Sitting, Cuff  "Size: Adult Regular)   Pulse 70   Temp 97.1  F (36.2  C) (Temporal)   Resp 16   Ht 1.651 m (5' 5\")   Wt 89.9 kg (198 lb 4.8 oz)   SpO2 98%   BMI 33.00 kg/m    Body mass index is 33 kg/m .    Physical Exam   {Brief/partially selected :897980}    {Diagnostic Test Results (Optional):378063}        Signed Electronically by: Imelda Eugene PA-C  {Email feedback regarding this note to [email or web address]   :297534}  " MILAGROS

## 2024-11-21 NOTE — PROGRESS NOTES
ANTICOAGULATION MANAGEMENT     Fausto Farr 84 year old male is on warfarin with therapeutic INR result. (Goal INR 2.5-3.5)    Recent labs: (last 7 days)     11/21/24  1004   INR 3.3*         ASSESSMENT     Source(s): Chart Review and Patient/Caregiver Call     Warfarin doses taken: Booster dose(s) recently taken which may be affecting INR  Diet: No new diet changes identified  Medication/supplement changes: Aspirin 11/18-11/23/24; plavix started 11/17/24, will take x 1 year  New illness, injury, or hospitalization: Yes: recovering from hospitalization 11/12-11/16/24 for NSTEMI; cardiomyopathy  Signs or symptoms of bleeding or clotting: Yes: several bruises from hospital stay and angiogram   Previous result: Therapeutic last visit; previously outside of goal range  Additional findings:  office visit today--notes are not complete       PLAN     Recommended plan for temporary change(s) and ongoing change(s) affecting INR     Dosing Instructions: Continue your current warfarin dose with next INR in 8 days       Summary  As of 11/21/2024      Full warfarin instructions:  7.5 mg every Mon, Wed, Fri; 5 mg all other days   Next INR check:  11/29/2024               Telephone call with Ralph who verbalizes understanding and agrees to plan and who agrees to plan and repeated back plan correctly    Lab visit scheduled    Education provided: Taking warfarin: Importance of taking warfarin as instructed    Plan made per [facility] anticoagulation protocol    Aure Sampson, RN  11/21/2024  Anticoagulation Clinic  [employer] for routing messages: joel DOWELL  [facility] patient phone line: [phone]        _______________________________________________________________________     Anticoagulation Episode Summary       Current INR goal:  2.5-3.5   TTR:  41.0% (1 y)   Target end date:  Indefinite   Send INR reminders to:  SHANNA DOWELL    Indications    S/P mitral valve replacement [Z95.2]  Long term current use of anticoagulant  therapy [Z79.01]  Longstanding persistent atrial fibrillation (H) [I48.11]             Comments:  Per 9/20/22 Cardio Note strict INR goal of 2.5-3.5. If INR falls below 2.5 at any time, needs to be bridged with Lovenox. consent to leave DVM              Anticoagulation Care Providers       Provider Role Specialty Phone number    Jodi Flower Mai, MD Referring Internal Medicine - Pediatrics [phone]    Kerwin Ramos MD Referring Internal Medicine [phone]

## 2024-11-22 ENCOUNTER — HOSPITAL ENCOUNTER (OUTPATIENT)
Dept: CARDIAC REHAB | Facility: CLINIC | Age: 84
Discharge: HOME | End: 2024-11-22
Attending: STUDENT IN AN ORGANIZED HEALTH CARE EDUCATION/TRAINING PROGRAM
Payer: MEDICARE

## 2024-11-22 DIAGNOSIS — I50.22 CHRONIC SYSTOLIC CONGESTIVE HEART FAILURE (H): ICD-10-CM

## 2024-11-22 PROCEDURE — 93798 PHYS/QHP OP CAR RHAB W/ECG: CPT | Mod: KX

## 2024-11-22 PROCEDURE — 93798 PHYS/QHP OP CAR RHAB W/ECG: CPT

## 2024-11-22 PROCEDURE — 93797 PHYS/QHP OP CAR RHAB WO ECG: CPT

## 2024-11-25 ENCOUNTER — HOSPITAL ENCOUNTER (OUTPATIENT)
Dept: CARDIAC REHAB | Facility: CLINIC | Age: 84
Discharge: HOME | End: 2024-11-25
Attending: STUDENT IN AN ORGANIZED HEALTH CARE EDUCATION/TRAINING PROGRAM
Payer: MEDICARE

## 2024-11-25 DIAGNOSIS — J32.9 SINUSITIS, UNSPECIFIED CHRONICITY, UNSPECIFIED LOCATION: ICD-10-CM

## 2024-11-25 PROCEDURE — 93798 PHYS/QHP OP CAR RHAB W/ECG: CPT | Mod: KX

## 2024-11-26 RX ORDER — FLUTICASONE PROPIONATE 50 MCG
1 SPRAY, SUSPENSION (ML) NASAL DAILY
Qty: 16 G | Refills: 0 | OUTPATIENT
Start: 2024-11-26

## 2024-12-03 ENCOUNTER — HOSPITAL ENCOUNTER (OUTPATIENT)
Dept: CARDIAC REHAB | Facility: CLINIC | Age: 84
Discharge: HOME | End: 2024-12-03
Attending: STUDENT IN AN ORGANIZED HEALTH CARE EDUCATION/TRAINING PROGRAM
Payer: MEDICARE

## 2024-12-03 PROCEDURE — 93798 PHYS/QHP OP CAR RHAB W/ECG: CPT | Mod: KX

## 2024-12-06 ENCOUNTER — HOSPITAL ENCOUNTER (OUTPATIENT)
Dept: CARDIAC REHAB | Facility: CLINIC | Age: 84
Discharge: HOME | End: 2024-12-06
Attending: STUDENT IN AN ORGANIZED HEALTH CARE EDUCATION/TRAINING PROGRAM
Payer: MEDICARE

## 2024-12-06 PROCEDURE — 93798 PHYS/QHP OP CAR RHAB W/ECG: CPT

## 2024-12-09 ENCOUNTER — HOSPITAL ENCOUNTER (OUTPATIENT)
Dept: CARDIAC REHAB | Facility: CLINIC | Age: 84
Discharge: HOME | End: 2024-12-09
Attending: STUDENT IN AN ORGANIZED HEALTH CARE EDUCATION/TRAINING PROGRAM
Payer: MEDICARE

## 2024-12-09 PROCEDURE — 93798 PHYS/QHP OP CAR RHAB W/ECG: CPT | Mod: KX

## 2024-12-10 ENCOUNTER — LAB (OUTPATIENT)
Dept: LAB | Facility: CLINIC | Age: 84
End: 2024-12-10
Payer: MEDICARE

## 2024-12-10 ENCOUNTER — ANTICOAGULATION THERAPY VISIT (OUTPATIENT)
Dept: ANTICOAGULATION | Facility: CLINIC | Age: 84
End: 2024-12-10

## 2024-12-10 DIAGNOSIS — I25.10 CORONARY ARTERY DISEASE WITHOUT ANGINA PECTORIS, UNSPECIFIED VESSEL OR LESION TYPE, UNSPECIFIED WHETHER NATIVE OR TRANSPLANTED HEART: ICD-10-CM

## 2024-12-10 DIAGNOSIS — R21 RASH: ICD-10-CM

## 2024-12-10 DIAGNOSIS — Z95.2 S/P MITRAL VALVE REPLACEMENT: Primary | ICD-10-CM

## 2024-12-10 DIAGNOSIS — E11.9 TYPE 2 DIABETES MELLITUS WITHOUT COMPLICATION, WITHOUT LONG-TERM CURRENT USE OF INSULIN (H): ICD-10-CM

## 2024-12-10 DIAGNOSIS — Z95.2 S/P AORTIC VALVE REPLACEMENT: ICD-10-CM

## 2024-12-10 DIAGNOSIS — Z79.01 LONG TERM CURRENT USE OF ANTICOAGULANT THERAPY: ICD-10-CM

## 2024-12-10 DIAGNOSIS — I48.11 LONGSTANDING PERSISTENT ATRIAL FIBRILLATION (H): ICD-10-CM

## 2024-12-10 DIAGNOSIS — Z95.2 S/P MITRAL VALVE REPLACEMENT: ICD-10-CM

## 2024-12-10 LAB
ALBUMIN SERPL BCG-MCNC: 4.4 G/DL (ref 3.5–5.2)
ALP SERPL-CCNC: 59 U/L (ref 40–150)
ALT SERPL W P-5'-P-CCNC: 26 U/L (ref 0–70)
ANION GAP SERPL CALCULATED.3IONS-SCNC: 11 MMOL/L (ref 7–15)
AST SERPL W P-5'-P-CCNC: 36 U/L (ref 0–45)
BILIRUB SERPL-MCNC: 0.3 MG/DL
BUN SERPL-MCNC: 16.3 MG/DL (ref 8–23)
CALCIUM SERPL-MCNC: 10 MG/DL (ref 8.8–10.4)
CHLORIDE SERPL-SCNC: 102 MMOL/L (ref 98–107)
CREAT SERPL-MCNC: 0.96 MG/DL (ref 0.67–1.17)
EGFRCR SERPLBLD CKD-EPI 2021: 78 ML/MIN/1.73M2
EST. AVERAGE GLUCOSE BLD GHB EST-MCNC: 148 MG/DL
GLUCOSE SERPL-MCNC: 114 MG/DL (ref 70–99)
HBA1C MFR BLD: 6.8 % (ref 0–5.6)
HCO3 SERPL-SCNC: 25 MMOL/L (ref 22–29)
INR BLD: 3.3 (ref 0.9–1.1)
POTASSIUM SERPL-SCNC: 4.5 MMOL/L (ref 3.4–5.3)
PROT SERPL-MCNC: 7.7 G/DL (ref 6.4–8.3)
SODIUM SERPL-SCNC: 138 MMOL/L (ref 135–145)

## 2024-12-10 PROCEDURE — 83036 HEMOGLOBIN GLYCOSYLATED A1C: CPT

## 2024-12-10 PROCEDURE — 36415 COLL VENOUS BLD VENIPUNCTURE: CPT

## 2024-12-10 PROCEDURE — 80053 COMPREHEN METABOLIC PANEL: CPT

## 2024-12-10 PROCEDURE — 85610 PROTHROMBIN TIME: CPT

## 2024-12-10 RX ORDER — FLUOCINONIDE 0.5 MG/G
OINTMENT TOPICAL 2 TIMES DAILY
Qty: 60 G | Refills: 11 | Status: SHIPPED | OUTPATIENT
Start: 2024-12-10

## 2024-12-10 RX ORDER — MESALAMINE 800 MG/1
800 TABLET, DELAYED RELEASE ORAL 2 TIMES DAILY
OUTPATIENT
Start: 2024-12-10

## 2024-12-10 NOTE — RESULT ENCOUNTER NOTE
Dear Ralph,    Your A1C and metabolic panel results are stable/at goal.  At this time, I do not recommend any changes to your current plan of care.    Please feel free to call with any questions.  Otherwise, we can discuss further at your next appointment.    Hope you are well.    Sincerely,    Chris Flower MD

## 2024-12-10 NOTE — PROGRESS NOTES
ANTICOAGULATION MANAGEMENT     Fausto Farr 84 year old male is on warfarin with therapeutic INR result. (Goal INR 2.5-3.5)    Recent labs: (last 7 days)     12/10/24  1007   INR 3.3*       ASSESSMENT     Source(s): Chart Review and Patient/Caregiver Call     Warfarin doses taken: Warfarin taken as instructed  Diet: No new diet changes identified  Medication/supplement changes: None noted  New illness, injury, or hospitalization: No.  Continues with cardiac rehab and has been helping him to feel better.  Plans to restart physical therapy for his back soon  Signs or symptoms of bleeding or clotting: No  Previous result: Supratherapeutic.  Warfarin dose was decreased 5.9% at last INR check.  Additional findings: None       PLAN     Recommended plan for no diet, medication or health factor changes affecting INR     Dosing Instructions: Continue your current warfarin dose with next INR in 2 weeks       Summary  As of 12/10/2024      Full warfarin instructions:  7.5 mg every Sun, Wed; 5 mg all other days   Next INR check:  12/26/2024               Telephone call with Ralph who agrees to plan and repeated back plan correctly    Lab visit scheduled    Education provided: Please call back if any changes to your diet, medications or how you've been taking warfarin    Plan made per [facility] anticoagulation protocol    Ruthann Sanders RN  12/10/2024  Anticoagulation Clinic  [employer] for routing messages: joel DOWELL  [facility] patient phone line: [phone]        _______________________________________________________________________     Anticoagulation Episode Summary       Current INR goal:  2.5-3.5   TTR:  40.6% (1 y)   Target end date:  Indefinite   Send INR reminders to:  SHANNA DOWELL    Indications    S/P mitral valve replacement [Z95.2]  Long term current use of anticoagulant therapy [Z79.01]  Longstanding persistent atrial fibrillation (H) [I48.11]             Comments:  Per 9/20/22 Cardio Note strict INR goal of  2.5-3.5. If INR falls below 2.5 at any time, needs to be bridged with Lovenox. consent to leave DVM              Anticoagulation Care Providers       Provider Role Specialty Phone number    Jodi Flower Mai, MD Referring Internal Medicine - Pediatrics [phone]    Kerwin Ramos MD Referring Internal Medicine [phone]

## 2024-12-10 NOTE — TELEPHONE ENCOUNTER
Please inform pt that mesalamine should come from GI provider - please assist in having him request from them.    [facility] printed [facility]box - please fax

## 2024-12-10 NOTE — TELEPHONE ENCOUNTER
Patient returned call to clinic.     Patient stated he takes mesalamine for ulcerative colitis.     He uses the fluocinonide (LIDEX) 0.05 % external ointment every time he takes a shower. He said Dr. Flower has prescribed it in the past. It was discontinued on 11/12/24.    Johanny Simons RN, BSN, PHN  [facility]

## 2024-12-10 NOTE — TELEPHONE ENCOUNTER
"\"Antoinette Fulton, RN to [facility]       12/10/24 12:50 PM  Clinic RN: Please investigate patient's chart or contact patient if the information cannot be found because the medication is listed as historical or discontinued. Confirm patient is taking this medication. Document findings and route refill encounter to provider for approval or denial.\"    Attempted to reach patient via phone to inquire about medications as requested above. Left voicemail to call back and speak to a triage nurse.     Chris Simms RN on 12/10/2024 at 1:38 PM    "

## 2024-12-10 NOTE — TELEPHONE ENCOUNTER
Medication Question or Refill    Contacts       Contact Date/Time Type Contact Phone/Fax    12/10/2024 10:04 AM CST Phone (Incoming) Ralph Farr (Self) [phone] (H)            What medication are you calling about (include dose and sig)?: mesalamine (ASACOL HD) 800 MG EC tablet and Flucinonide Ointment 0.05% 60 grams    Preferred Pharmacy:     PATIENT NEEDS THESE TO BE SENT TO;  [facility]  FAX [phone]    Controlled Substance Agreement on file:   CSA -- Patient Level:    CSA: None found at the patient level.       Who prescribed the medication?:     Do you need a refill? Yes    When did you use the medication last? yesterday    Patient offered an appointment? No    Do you have any questions or concerns?  Yes: DO NOT SEND TO [facility].  PLEASE SEND TO [employer]    Could we send this information to you in [facility] or would you prefer to receive a phone call?:   Patient would prefer a phone call   Okay to leave a detailed message?: Yes at Home number on file [phone] (home)     JOE CUEVAS on 12/10/2024 at 10:09 AM

## 2024-12-13 ENCOUNTER — HOSPITAL ENCOUNTER (OUTPATIENT)
Dept: CARDIAC REHAB | Facility: CLINIC | Age: 84
Discharge: HOME | End: 2024-12-13
Attending: STUDENT IN AN ORGANIZED HEALTH CARE EDUCATION/TRAINING PROGRAM
Payer: MEDICARE

## 2024-12-13 PROCEDURE — 93798 PHYS/QHP OP CAR RHAB W/ECG: CPT | Mod: KX

## 2024-12-16 ENCOUNTER — HOSPITAL ENCOUNTER (OUTPATIENT)
Dept: CARDIAC REHAB | Facility: CLINIC | Age: 84
Discharge: HOME | End: 2024-12-16
Attending: STUDENT IN AN ORGANIZED HEALTH CARE EDUCATION/TRAINING PROGRAM
Payer: MEDICARE

## 2024-12-16 PROCEDURE — 93798 PHYS/QHP OP CAR RHAB W/ECG: CPT | Mod: KX

## 2024-12-19 ENCOUNTER — OFFICE VISIT (OUTPATIENT)
Dept: CARDIOLOGY | Facility: CLINIC | Age: 84
End: 2024-12-19
Payer: MEDICARE

## 2024-12-19 VITALS
DIASTOLIC BLOOD PRESSURE: 62 MMHG | WEIGHT: 199.9 LBS | OXYGEN SATURATION: 98 % | HEIGHT: 65 IN | SYSTOLIC BLOOD PRESSURE: 100 MMHG | BODY MASS INDEX: 33.3 KG/M2 | HEART RATE: 70 BPM

## 2024-12-19 DIAGNOSIS — I50.22 CHRONIC SYSTOLIC CONGESTIVE HEART FAILURE (H): ICD-10-CM

## 2024-12-19 DIAGNOSIS — I48.91 ATRIAL FIBRILLATION STATUS POST CARDIOVERSION (H): ICD-10-CM

## 2024-12-19 DIAGNOSIS — I50.32 CHRONIC DIASTOLIC CONGESTIVE HEART FAILURE (H): ICD-10-CM

## 2024-12-19 DIAGNOSIS — I47.29 NSVT (NONSUSTAINED VENTRICULAR TACHYCARDIA) (H): ICD-10-CM

## 2024-12-19 DIAGNOSIS — I25.10 CORONARY ARTERY DISEASE WITHOUT ANGINA PECTORIS, UNSPECIFIED VESSEL OR LESION TYPE, UNSPECIFIED WHETHER NATIVE OR TRANSPLANTED HEART: ICD-10-CM

## 2024-12-19 RX ORDER — METOPROLOL SUCCINATE 50 MG/1
TABLET, EXTENDED RELEASE ORAL
COMMUNITY
Start: 2024-12-19

## 2024-12-19 RX ORDER — CLOPIDOGREL BISULFATE 75 MG/1
75 TABLET ORAL DAILY
Qty: 90 TABLET | Refills: 3 | Status: SHIPPED | OUTPATIENT
Start: 2024-12-19

## 2024-12-19 NOTE — LETTER
12/19/2024    Chris Flower MD  [address] [place] [address]    RE: Fausto Farr       Dear Colleague,     I had the pleasure of seeing Fausto Farr in the [facility].  HPI and Plan:    1. Valvular heart disease.  AVR in 2006 with subsequent perivalvular leak and redo mechanical AVR and concomitant mechanical MVR in 2013.   2.  Coronary artery disease with CABG (LIMA to LAD and radial graft to RCA) in 2006.   3.  Chronic diastolic heart failure.  LVEF is 55%-60%.   4.  Previous endovascular AAA repair.   5.  AV conduction disease with bifascicular block and prolonged NY.    6.  Obstructive sleep apnea with use of CPAP.   7.  Hypertension.   8.  Dyslipidemia.   9.  Chronic back pain.   10.  Mild internal carotid artery disease.  Severe stenosis of left external carotid artery.   11.   History of typical atrial flutter, successful catheter ablation in 04/2019.   12.  Varicose veins with venous insufficiency.  Treatment with VenaSeal closure, sclerotherapy and phlebectomy by Dr. Whitman in 01/2019.  13.  Asymptomatic NSVT.    Since I last saw the patient he developed some chest discomfort we reviewed his heart catheterization from November 2024.  The the native left circumflex has no more than 30 to 40% narrowing the right coronary artery is obstructive but the radial artery graft to the distal RCA PDA is widely patent the LIMA graft to the LAD is atretic the narrowing in the LAD only appear to be perhaps 50 to 70% but Dr. Ching felt it was 70 to 75% or more and he elected the stenting with 2 stents of 4.0 interlocked with a 3.0 stent with good result.  Of note the troponin prompted it was mildly elevated and the stress test that showed apical inferior so that could have been the tail end of the LAD.    Patient reports doing well now    Please see previous notes this patient had a remote history of atrial flutter ablation many years ago through our EP service.  He then had  atypical atrial flutter picked up on the pacemaker but because of some clinical issue he and I were never notified of it from our pacer clinic they could not get a hold the patient he then saw Dr. jeffrey noted the patient had gone back into atrial flutter even though we got him out of it but again it was atypical flutter the patient was no longer symptomatic and Dr. Jeffrey said he did not recommend another attempt at restoring sinus rhythm.  The patient apparently is feeling well now.  He has a pacemaker in place he has double mechanical valves he is already on Coumadin his INR most recently was 3.3    On exam today the patient is rhythm is regular but we know is because the pacer is taken over he has underlying complete heart block and that is why despite the atrial flutter typical or atypical he is now 100% VVI paced.  There was some concern about why his ejection fraction dropped and they thought it could have been from the LAD again I do not think the LAD was particularly severely flow-limiting I wonder if it was simply that he is been pacing now 100% before he was not pacemaker 100% that could explain the drop in EF last EF was about 40 to 45% he probably should get another echo in the next 9 to 12 months    The family notes that his blood pressure in the rehab is running between 90 and 100 he is not particularly lightheaded with any of this the only 2 medicines he is on that affect blood pressure is the metoprolol he was taking 75 mg a day but now he is 100% VVI paced so we do not have to try to keep the rate control because of the complete heart block he is also on Flomax I am to decrease the metoprolol from 75 down to 50    I did review his lipid numbers his LDL is slightly above 70 but the 3 previous ones were all below 70 he runs a chronically low HDL I am going to have him come back in about 4 to 5 months for an office visit and we will check the lipids at that time depending on how he is doing we could  probably stop the Plavix since that will be about 6 months with a new stent and again this was not really an acute coronary syndrome even though he had a positive troponin  do not think this 70% LAD lesion was causing an elevated troponin and that much of a problem the next cardiologist who sees perhaps Dr. Huerta or Dr. Amaro can decide about if the cholesterol needs to be treated more aggressively the plan with a number show and also if they should continue Plavix long-term with the Coumadin versus de-escalating Plavix down to a baby aspirin    We also reviewed hemoglobin A1c which was 6.8% which is reasonable to continue with diet control today's visit was 45 minutes    Orders Placed This Encounter   Procedures     Lipid Profile     Follow-Up with Cardiology     Orders Placed This Encounter   Medications     clopidogrel (PLAVIX) 75 MG tablet     Sig: Take 1 tablet (75 mg) by mouth daily. Dose to start tomorrow.     Dispense:  90 tablet     Refill:  3     metoprolol succinate ER (TOPROL XL) 50 MG 24 hr tablet     Sig: One tablet (50mg) daily     Medications Discontinued During This Encounter   Medication Reason     warfarin ANTICOAGULANT (COUMADIN) 5 MG tablet      metoprolol succinate ER (TOPROL XL) 50 MG 24 hr tablet Reorder (No AVS)     clopidogrel (PLAVIX) 75 MG tablet Reorder (No AVS)         Encounter Diagnoses   Name Primary?     Atrial fibrillation status post cardioversion (H)      Chronic systolic congestive heart failure (H)      NSVT (nonsustained ventricular tachycardia)      Coronary artery disease without angina pectoris, unspecified vessel or lesion type, unspecified whether native or transplanted heart      Chronic diastolic congestive heart failure (H)        CURRENT MEDICATIONS:  Current Outpatient Medications   Medication Sig Dispense Refill     acetaminophen (TYLENOL) 325 MG tablet Take 650 mg by mouth 2 times daily.       amoxicillin-clavulanate (AUGMENTIN) 875-125 MG tablet Take 1 tablet by  mouth daily. 90 tablet 3     cholecalciferol 25 MCG (1000 UT) TABS Take 1,000 Units by mouth daily       clopidogrel (PLAVIX) 75 MG tablet Take 1 tablet (75 mg) by mouth daily. Dose to start tomorrow. 90 tablet 3     ezetimibe (ZETIA) 10 MG tablet Take 1 tablet (10 mg) by mouth daily. 100 tablet 3     ferrous sulfate (FEROSUL) 325 (65 Fe) MG tablet Take 325 mg by mouth daily (with breakfast)       fluocinonide (LIDEX) 0.05 % external ointment Apply topically 2 times daily. 60 g 11     fluticasone (FLONASE) 50 MCG/ACT nasal spray Spray 1 spray into both nostrils daily. 16 g 0     glucosamine-chondroitin 500-400 MG CAPS per capsule Take 1 capsule by mouth 2 times daily.       mesalamine (ASACOL HD) 800 MG EC tablet Take 800 mg by mouth 2 times daily.       metoprolol succinate ER (TOPROL XL) 50 MG 24 hr tablet One tablet (50mg) daily       nitroGLYcerin (NITROSTAT) 0.4 MG sublingual tablet For chest pain place 1 tablet under the tongue every 5 minutes for 3 doses. If symptoms persist 5 minutes after 1st dose call 911. 30 tablet 0     rosuvastatin (CRESTOR) 40 MG tablet Take 1 tablet (40 mg) by mouth daily. 100 tablet 3     tamsulosin (FLOMAX) 0.4 MG capsule Take 1 capsule (0.4 mg) by mouth daily. 90 capsule 3     warfarin ANTICOAGULANT (COUMADIN) 5 MG tablet Take 1 tablet (5mg) every Mon,Wed & Fri / Take 1 1/2 tablets (7.5mg) all other days OR per INR clinic 120 tablet 3     enoxaparin ANTICOAGULANT (LOVENOX) 100 MG/ML syringe Inject 0.9 mLs (90 mg) subcutaneously every 12 hours. (Patient not taking: Reported on 12/19/2024) 12.6 mL 0       ALLERGIES     Allergies   Allergen Reactions     Bees Anaphylaxis       PAST MEDICAL HISTORY:  Past Medical History:   Diagnosis Date     A-fib and flutter permanent per dr rachele JENKINS  pacer in place      Abdominal pain      Anemia     currently taking iron     Back pain     since 1980     BPH (benign prostatic hyperplasia)      Bruit      CAD (coronary artery disease)      Cellulitis  10/18/2012     Cellulitis 05/2018    GrpB strep LLE cellulitis  negative RACHAEL for veg     Chronic venous insufficiency     bilat lower extremities     Contact dermatitis and other eczema, due to unspecified cause      Diaphragmatic hernia without mention of obstruction or gangrene      Diastolic HF (heart failure) (H)      Gastric ulcer      Hypertension 08/06/2013     Lumbago      Mesenteric artery insufficiency (H)     known AAA and narrowing of intestinal artery's  followed by dr raymond     Metabolic syndrome      Mitral valve disease     s/p mechanical MVR, prior mech AVR     Nonallopathic lesion of lumbar region      Nonallopathic lesion of rib cage      Nonallopathic lesion of sacral region      GAGE (obstructive sleep apnea)     CPAP     Paroxysmal atrial fibrillation (H) 10/18/2012    occured after stopping BB  (prior  aflutter ablation)     Prostate cancer (H) 2008    radiation seed, XRT      PVD (peripheral vascular disease) (H)      Rotator cuff strain     and sprain     S/P AAA repair      S/P aortic valve replacement 2006    developed perivalve leak and MS, therefore redo surg 2013     S/P CABG (coronary artery bypass graft) 2006    Lima-Lad, RA-Rca     Sciatica of left side     since 2000     Sepsis due to group B Streptococcus (H) 05/19/2018     TIA (transient ischemic attack) 08/01/2022     Total knee replacement status 07/22/2019     Ulcerative colitis (H)      Varicose veins of bilateral lower extremities with other complications     s/p RLE vein stripping     Vitamin D deficiency        PAST SURGICAL HISTORY:  Past Surgical History:   Procedure Laterality Date     AORTIC VALVE REPLACEMENT  1/3/06    redo AVR SJM 21mm and SJM MVR 27mm in 2013SJM 21([id number]):AVR, SJM 27 :MVR-     APPLY WOUND VAC N/A 11/12/2019    Procedure: APPLICATION, WOUND VAC;  Surgeon: Sara Martinez MD;  Location: [facility] OR     ARTHROPLASTY KNEE      right knee     ARTHROPLASTY KNEE Right 7/22/2019    Procedure:  Right total knee arthroplasty;  Surgeon: Prasanth Jensen MD;  Location:  OR     BACK SURGERY  Oct 2015    Fusion L4-5, laminectomy L2, L3     BYPASS GRAFT ARTERY CORONARY  10/2013    reimplantation of radial artery graft to RCA     CARDIAC CATHERIZATION  11/2005    Stent placed to RCA     CARDIAC CATHERIZATION  04/2013    Occluded RCA, patent LIMA to LAD and radial graft to PDA     CARPAL TUNNEL RELEASE RT/LT  1994     COLONOSCOPY  8-22-11     CV CORONARY ANGIOGRAM N/A 11/14/2024    Procedure: Coronary Angiogram;  Surgeon: Ebenezer Wyatt MD;  Location:  HEART CARDIAC CATH LAB     CV PCI N/A 11/14/2024    Procedure: Percutaneous Coronary Intervention;  Surgeon: Ebenezer Wyatt MD;  Location: [facility] CARDIAC CATH LAB     CYSTOSCOPY FLEXIBLE  10/16/2013    Procedure: CYSTOSCOPY FLEXIBLE;  FLEXIBLE CYSTOSCOPY / DILATION OF URETHRA / INSERTION OF LESLIE;  Surgeon: Cooper Wallace MD;  Location: [facility]     ENDOVASCULAR REPAIR, INFRARENAL ABDOMINAL AORTIC ANEURYSM/DISSECTION; MODULAR BIFURCATED PROSTHESIS  2006    AAA repair endovascular     ENT SURGERY       EP ABLATION ATRIAL FLUTTER N/A 4/22/2019    Procedure: EP Ablation Atrial Flutter;  Surgeon: Jessy Vasquez MD;  Location:  HEART CARDIAC CATH LAB     EP PACEMAKER DEVICE & LEAD IMPLANT- RIGHT ATRIAL & RIGHT VENTRICULAR N/A 8/2/2022    Procedure: Pacemaker Device & Lead Implant - Right Atrial & Right Ventricular;  Surgeon: Jessy Vasquez MD;  Location:  HEART CARDIAC CATH LAB     GENITOURINARY SURGERY  6/16/08    radioactive seeding     GRAFT FLAP PEDICLE EXTREMITY (LOCATION) Right 11/12/2019    Procedure: SURGICAL PROCUREMENT, FLAP, PEDICLE, EXTREMITY;  Surgeon: Sara Martinez MD;  Location:  OR     GRAFT SKIN SPLIT THICKNESS FROM EXTREMITY Right 11/12/2019    Procedure: RIGHT GASTRONEMIUS FLAP TO RIGHT KNEE, SPLIT THICKNESS SKIN GRAFT FROM RIGHT THIGH TO RIGHT KNEE, SURGICAL PROCUREMENT, FLAP, PEDICLE,  EXTREMITY, WOUND VAC PLACEMENT;  Surgeon: Sara Martinez MD;  Location:  OR     HEAD & NECK SURGERY  1997    vocal cord polypectomy     INCISION AND DRAINAGE KNEE, COMBINED Right 8/29/2019    Procedure: INCISION AND DRAINAGE, KNEE W/ Secondary Wound Closure;  Surgeon: Prasanth Jensen MD;  Location:  OR     IRRIGATION AND DEBRIDEMENT KNEE, PLACE ANTIBIOTIC CEMENT BEADS / SPACE Right 2/12/2020    Procedure: 1. Irrigation and debridement of wound breakdown, right total knee arthroplasty.  2. Irrigation and debridement of right total knee with placement of antibiotic-impregnated (vancomycin) absorbable antibiotic beads.;  Surgeon: Prasanth Jensen MD;  Location: [facility] OR     IRRIGATION AND DEBRIDEMENT LOWER EXTREMITY, COMBINED Right 12/8/2019    Procedure: DEBRIDEMENT OF RIGHT CALF AND WOUND CLOSURE.;  Surgeon: Sara Martinez MD;  Location:  OR     IRRIGATION AND DEBRIDEMENT UPPER EXTREMITY, COMBINED Right 1/7/2023    Procedure: Right elbow arthrotomy, irrigation and debridement;  Surgeon: Jose A Christine MD;  Location:  OR     KNEE SURGERY  2001 Right knee arthroscopy     OPTICAL TRACKING SYSTEM FUSION SPINE POSTERIOR LUMBAR THREE+ LEVELS N/A 10/29/2015    Procedure: OPTICAL TRACKING SYSTEM FUSION SPINE POSTERIOR LUMBAR THREE+ LEVELS;  Surgeon: Walt Garcia MD;  Location:  OR     PROSTATE SURGERY      radioactive seeding 6/16/08     REPAIR ANEURYSM ABDOMINAL AORTA  06/08     REPAIR VALVE MITRAL  10/16/2013    SJM 21([id number]):AVR, M 27 :MVRProcedure: REPAIR VALVE MITRAL;  REDO STERNOTOMY/REDO AORTIC VALVE REPLACEMENT/ MITRAL VALVE REPLACEMENT/REIMPLANTATION OF RIGHT CORONARY ARTERY BYPASS WITH RACHAEL ( ON PUMP);  Surgeon: Viet Singh MD;  Location:  OR     REPLACE VALVE AORTIC  10/16/2013    Procedure: REPLACE VALVE AORTIC;;  Surgeon: Viet Singh MD;  Location:  OR     SURGERY GENERAL IP CONSULT  05/2008 Excision aneurysm abdominal aorta      SURGERY GENERAL IP CONSULT   Vocal cord polypectomy     VASCULAR SURGERY  1993     varicose vein stripping     ZZC CABG, VEIN, TWO  1/3/06    Left radial to RCA, LIMA to LAD (RA to RCA reimplanted at time of 2013 surg)       FAMILY HISTORY:  Family History   Problem Relation Age of Onset     No Known Problems Mother      Coronary Artery Disease Father         CABG     Heart Disease Father         Pacemaker     No Known Problems Brother      No Known Problems Sister      No Known Problems Son      Other Cancer Daughter      No Known Problems Daughter      Heart Disease Brother      Other - See Comments Grandchild        SOCIAL HISTORY:  Social History     Socioeconomic History     Marital status:      Spouse name: None     Number of children: None     Years of education: None     Highest education level: None   Tobacco Use     Smoking status: Former     Current packs/day: 0.00     Average packs/day: 1 pack/day for 40.5 years (40.5 ttl pk-yrs)     Types: Cigarettes     Start date: 4/15/1962     Quit date: 10/23/2002     Years since quittin.1     Smokeless tobacco: Never   Vaping Use     Vaping status: Never Used   Substance and Sexual Activity     Alcohol use: Yes     Comment: a couple beers per week (socially)     Drug use: No     Sexual activity: Not Currently     Partners: Female   Other Topics Concern     Parent/sibling w/ CABG, MI or angioplasty before 65F 55M? Yes     Comment: Brother had bypass at 55     Caffeine Concern No     Comment: 6-8 cups of half and half per day     Special Diet No     Exercise No     Social Drivers of Health     Financial Resource Strain: Low Risk  (2024)    Financial Resource Strain      Within the past 12 months, have you or your family members you live with been unable to get utilities (heat, electricity) when it was really needed?: No   Food Insecurity: Low Risk  (2024)    Food Insecurity      Within the past 12 months, did you worry that your food  "would run out before you got money to buy more?: No      Within the past 12 months, did the food you bought just not last and you didn t have money to get more?: No   Transportation Needs: Low Risk  (11/13/2024)    Transportation Needs      Within the past 12 months, has lack of transportation kept you from medical appointments, getting your medicines, non-medical meetings or appointments, work, or from getting things that you need?: No   Physical Activity: Unknown (11/4/2024)    Exercise Vital Sign      Days of Exercise per Week: Patient declined      Minutes of Exercise per Session: 20 min   Stress: No Stress Concern Present (11/4/2024)    [employer] - Occupational Stress Questionnaire      Feeling of Stress : Not at all   Social Connections: Unknown (11/4/2024)    Social Connection and Isolation Panel [NHANES]      Frequency of Social Gatherings with Friends and Family: Twice a week   Interpersonal Safety: Low Risk  (11/4/2024)    Interpersonal Safety      Do you feel physically and emotionally safe where you currently live?: Yes      Within the past 12 months, have you been hit, slapped, kicked or otherwise physically hurt by someone?: No      Within the past 12 months, have you been humiliated or emotionally abused in other ways by your partner or ex-partner?: No   Housing Stability: Low Risk  (11/13/2024)    Housing Stability      Do you have housing? : Yes      Are you worried about losing your housing?: No       Review of Systems:  Skin:        Eyes:       ENT:       Respiratory:  Positive for sleep apnea, CPAP  Cardiovascular:  Negative    Gastroenterology:      Genitourinary:       Musculoskeletal:       Neurologic:       Psychiatric:       Heme/Lymph/Imm:       Endocrine:         Physical Exam:  Vitals: /62 (BP Location: Right arm, Patient Position: Sitting, Cuff Size: Adult Regular)   Pulse 70   Ht 1.651 m (5' 5\")   Wt 90.7 kg (199 lb 14.4 oz)   SpO2 98%   BMI " 33.27 kg/m   Orthostatic Vitals from 12/17/24 0853 to 12/19/24 0853    Date and Time Orthostatic BP Orthostatic Pulse Patient Position BP   Location Cuff Size   12/19/24 0758 -- -- Sitting Right arm Adult Regular         Constitutional:           Skin:  no rash    Head: non tramatic    Eyes: non icteric    Lymph:no cervi monica lymph nodes    ENT:           Neck: normal carotid upstroke    Respiratory:        clear lungs  Cardiac:RRR (paced) good mech click, min syst murmur    GI:  +BS    Extremities and Muscular Skeletal: trace bilat ankle edema    Neurological:  non focal     Psych: A and Ox3    Recent Lab Results:  LIPID RESULTS:  Lab Results   Component Value Date    CHOL 124 11/14/2024    CHOL 152 06/01/2020    HDL 30 (L) 11/14/2024    HDL 34 (L) 06/01/2020    LDL 76 11/14/2024    LDL 90 06/01/2020    TRIG 92 11/14/2024    TRIG 138 06/01/2020    CHOLHDLRATIO 3.6 06/03/2015       LIVER ENZYME RESULTS:  Lab Results   Component Value Date    AST 36 12/10/2024    AST 19 06/01/2020    ALT 26 12/10/2024    ALT 16 06/01/2020       CBC RESULTS:  Lab Results   Component Value Date    WBC 8.9 11/16/2024    WBC 7.6 06/15/2020    RBC 4.30 (L) 11/16/2024    RBC 4.44 06/15/2020    HGB 12.7 (L) 11/16/2024    HGB 13.0 (L) 06/15/2020    HCT 37.4 (L) 11/16/2024    HCT 40.0 06/15/2020    MCV 87 11/16/2024    MCV 90 06/15/2020    MCH 29.5 11/16/2024    MCH 29.3 06/15/2020    MCHC 34.0 11/16/2024    MCHC 32.5 06/15/2020    RDW 13.2 11/16/2024    RDW 13.3 06/15/2020     11/16/2024     06/15/2020       BMP RESULTS:  Lab Results   Component Value Date     12/10/2024     06/01/2020    POTASSIUM 4.5 12/10/2024    POTASSIUM 4.0 08/03/2022    POTASSIUM 4.0 06/01/2020    CHLORIDE 102 12/10/2024    CHLORIDE 107 08/03/2022    CHLORIDE 106 06/01/2020    CO2 25 12/10/2024    CO2 27 08/03/2022    CO2 26 06/01/2020    ANIONGAP 11 12/10/2024    ANIONGAP 6 08/03/2022    ANIONGAP 4 06/01/2020     (H) 12/10/2024    GLC  137 (H) 01/08/2023     (H) 08/03/2022     (H) 06/01/2020    BUN 16.3 12/10/2024    BUN 15 08/03/2022    BUN 13 06/01/2020    CR 0.96 12/10/2024    CR 0.73 06/01/2020    GFRESTIMATED 78 12/10/2024    GFRESTIMATED >60 10/12/2022    GFRESTIMATED 72 10/06/2020    GFRESTBLACK 87 10/06/2020    AWILDA 10.0 12/10/2024    AWILDA 8.5 06/01/2020        A1C RESULTS:  Lab Results   Component Value Date    A1C 6.8 (H) 12/10/2024    A1C 5.9 04/25/2017       INR RESULTS:  Lab Results   Component Value Date    INR 3.3 (H) 12/10/2024    INR 4.1 (H) 11/29/2024    INR 1.37 (H) 11/16/2024    INR 1.35 (H) 11/15/2024    INR 2.00 (H) 07/08/2021    INR 1.80 (H) 06/17/2021           CC  Calderon Santo MD  [address]  [place]  [place] [id number]      Thank you for allowing me to participate in the care of your patient.      Sincerely,     Calderon Santo MD     [facility]  cc:   Calderon Santo MD  [address]  [place] [id number]

## 2024-12-19 NOTE — PROGRESS NOTES
HPI and Plan:    1. Valvular heart disease.  AVR in 2006 with subsequent perivalvular leak and redo mechanical AVR and concomitant mechanical MVR in 2013.   2.  Coronary artery disease with CABG (LIMA to LAD and radial graft to RCA) in 2006.   3.  Chronic diastolic heart failure.  LVEF is 55%-60%.   4.  Previous endovascular AAA repair.   5.  AV conduction disease with bifascicular block and prolonged FL.    6.  Obstructive sleep apnea with use of CPAP.   7.  Hypertension.   8.  Dyslipidemia.   9.  Chronic back pain.   10.  Mild internal carotid artery disease.  Severe stenosis of left external carotid artery.   11.   History of typical atrial flutter, successful catheter ablation in 04/2019.   12.  Varicose veins with venous insufficiency.  Treatment with VenaSeal closure, sclerotherapy and phlebectomy by Dr. Whitman in 01/2019.  13.  Asymptomatic NSVT.    Since I last saw the patient he developed some chest discomfort we reviewed his heart catheterization from November 2024.  The the native left circumflex has no more than 30 to 40% narrowing the right coronary artery is obstructive but the radial artery graft to the distal RCA PDA is widely patent the LIMA graft to the LAD is atretic the narrowing in the LAD only appear to be perhaps 50 to 70% but Dr. Ching felt it was 70 to 75% or more and he elected the stenting with 2 stents of 4.0 interlocked with a 3.0 stent with good result.  Of note the troponin prompted it was mildly elevated and the stress test that showed apical inferior so that could have been the tail end of the LAD.    Patient reports doing well now    Please see previous notes this patient had a remote history of atrial flutter ablation many years ago through our EP service.  He then had atypical atrial flutter picked up on the pacemaker but because of some clinical issue he and I were never notified of it from our pacer clinic they could not get a hold the patient he then saw Dr. jeffrey noted the patient  had gone back into atrial flutter even though we got him out of it but again it was atypical flutter the patient was no longer symptomatic and Dr. Stauffer said he did not recommend another attempt at restoring sinus rhythm.  The patient apparently is feeling well now.  He has a pacemaker in place he has double mechanical valves he is already on Coumadin his INR most recently was 3.3    On exam today the patient is rhythm is regular but we know is because the pacer is taken over he has underlying complete heart block and that is why despite the atrial flutter typical or atypical he is now 100% VVI paced.  There was some concern about why his ejection fraction dropped and they thought it could have been from the LAD again I do not think the LAD was particularly severely flow-limiting I wonder if it was simply that he is been pacing now 100% before he was not pacemaker 100% that could explain the drop in EF last EF was about 40 to 45% he probably should get another echo in the next 9 to 12 months    The family notes that his blood pressure in the rehab is running between 90 and 100 he is not particularly lightheaded with any of this the only 2 medicines he is on that affect blood pressure is the metoprolol he was taking 75 mg a day but now he is 100% VVI paced so we do not have to try to keep the rate control because of the complete heart block he is also on Flomax I am to decrease the metoprolol from 75 down to 50    I did review his lipid numbers his LDL is slightly above 70 but the 3 previous ones were all below 70 he runs a chronically low HDL I am going to have him come back in about 4 to 5 months for an office visit and we will check the lipids at that time depending on how he is doing we could probably stop the Plavix since that will be about 6 months with a new stent and again this was not really an acute coronary syndrome even though he had a positive troponin  do not think this 70% LAD lesion was causing an  elevated troponin and that much of a problem the next cardiologist who sees perhaps Dr. Huerta or Dr. Amaro can decide about if the cholesterol needs to be treated more aggressively the plan with a number show and also if they should continue Plavix long-term with the Coumadin versus de-escalating Plavix down to a baby aspirin    We also reviewed hemoglobin A1c which was 6.8% which is reasonable to continue with diet control today's visit was 45 minutes    Orders Placed This Encounter   Procedures    Lipid Profile    Follow-Up with Cardiology     Orders Placed This Encounter   Medications    clopidogrel (PLAVIX) 75 MG tablet     Sig: Take 1 tablet (75 mg) by mouth daily. Dose to start tomorrow.     Dispense:  90 tablet     Refill:  3    metoprolol succinate ER (TOPROL XL) 50 MG 24 hr tablet     Sig: One tablet (50mg) daily     Medications Discontinued During This Encounter   Medication Reason    warfarin ANTICOAGULANT (COUMADIN) 5 MG tablet     metoprolol succinate ER (TOPROL XL) 50 MG 24 hr tablet Reorder (No AVS)    clopidogrel (PLAVIX) 75 MG tablet Reorder (No AVS)         Encounter Diagnoses   Name Primary?    Atrial fibrillation status post cardioversion (H)     Chronic systolic congestive heart failure (H)     NSVT (nonsustained ventricular tachycardia)     Coronary artery disease without angina pectoris, unspecified vessel or lesion type, unspecified whether native or transplanted heart     Chronic diastolic congestive heart failure (H)        CURRENT MEDICATIONS:  Current Outpatient Medications   Medication Sig Dispense Refill    acetaminophen (TYLENOL) 325 MG tablet Take 650 mg by mouth 2 times daily.      amoxicillin-clavulanate (AUGMENTIN) 875-125 MG tablet Take 1 tablet by mouth daily. 90 tablet 3    cholecalciferol 25 MCG (1000 UT) TABS Take 1,000 Units by mouth daily      clopidogrel (PLAVIX) 75 MG tablet Take 1 tablet (75 mg) by mouth daily. Dose to start tomorrow. 90 tablet 3    ezetimibe (ZETIA) 10  MG tablet Take 1 tablet (10 mg) by mouth daily. 100 tablet 3    ferrous sulfate (FEROSUL) 325 (65 Fe) MG tablet Take 325 mg by mouth daily (with breakfast)      fluocinonide (LIDEX) 0.05 % external ointment Apply topically 2 times daily. 60 g 11    fluticasone (FLONASE) 50 MCG/ACT nasal spray Spray 1 spray into both nostrils daily. 16 g 0    glucosamine-chondroitin 500-400 MG CAPS per capsule Take 1 capsule by mouth 2 times daily.      mesalamine (ASACOL HD) 800 MG EC tablet Take 800 mg by mouth 2 times daily.      metoprolol succinate ER (TOPROL XL) 50 MG 24 hr tablet One tablet (50mg) daily      nitroGLYcerin (NITROSTAT) 0.4 MG sublingual tablet For chest pain place 1 tablet under the tongue every 5 minutes for 3 doses. If symptoms persist 5 minutes after 1st dose call 911. 30 tablet 0    rosuvastatin (CRESTOR) 40 MG tablet Take 1 tablet (40 mg) by mouth daily. 100 tablet 3    tamsulosin (FLOMAX) 0.4 MG capsule Take 1 capsule (0.4 mg) by mouth daily. 90 capsule 3    warfarin ANTICOAGULANT (COUMADIN) 5 MG tablet Take 1 tablet (5mg) every Mon,Wed & Fri / Take 1 1/2 tablets (7.5mg) all other days OR per INR clinic 120 tablet 3    enoxaparin ANTICOAGULANT (LOVENOX) 100 MG/ML syringe Inject 0.9 mLs (90 mg) subcutaneously every 12 hours. (Patient not taking: Reported on 12/19/2024) 12.6 mL 0       ALLERGIES     Allergies   Allergen Reactions    Bees Anaphylaxis       PAST MEDICAL HISTORY:  Past Medical History:   Diagnosis Date    A-fib and flutter permanent per dr jeffrey EP  pacer in place     Abdominal pain     Anemia     currently taking iron    Back pain     since 1980    BPH (benign prostatic hyperplasia)     Bruit     CAD (coronary artery disease)     Cellulitis 10/18/2012    Cellulitis 05/2018    GrpB strep LLE cellulitis  negative RACHAEL for veg    Chronic venous insufficiency     bilat lower extremities    Contact dermatitis and other eczema, due to unspecified cause     Diaphragmatic hernia without mention of  obstruction or gangrene     Diastolic HF (heart failure) (H)     Gastric ulcer     Hypertension 08/06/2013    Lumbago     Mesenteric artery insufficiency (H)     known AAA and narrowing of intestinal artery's  followed by dr raymond    Metabolic syndrome     Mitral valve disease     [facility]    Nonallopathic lesion of lumbar region     Nonallopathic lesion of rib cage     Nonallopathic lesion of sacral region     GAGE (obstructive sleep apnea)     CPAP    Paroxysmal atrial fibrillation (H) 10/18/2012    occured after stopping BB  (prior  aflutter ablation)    Prostate cancer (H) 2008    radiation seed, XRT     PVD (peripheral vascular disease) (H)     Rotator cuff strain     and sprain    S/P AAA repair     S/P aortic valve replacement 2006    developed perivalve leak and MS, therefore redo surg 2013    S/P CABG (coronary artery bypass graft) 2006    Lima-Lad, RA-Rca    Sciatica of left side     since 2000    Sepsis due to group B Streptococcus (H) 05/19/2018    TIA (transient ischemic attack) 08/01/2022    Total knee replacement status 07/22/2019    Ulcerative colitis (H)     Varicose veins of bilateral lower extremities with other complications     s/p RLE vein stripping    Vitamin D deficiency        PAST SURGICAL HISTORY:  Past Surgical History:   Procedure Laterality Date    AORTIC VALVE REPLACEMENT  1/3/06    redo AVR SJM 21mm and SJM MVR 27mm in 2013SJM 21([id number]):AVR, SJM 27 :MVR-    APPLY WOUND VAC N/A 11/12/2019    Procedure: APPLICATION, WOUND VAC;  Surgeon: Sara Martinez MD;  Location: [facility] OR    ARTHROPLASTY KNEE      right knee    ARTHROPLASTY KNEE Right 7/22/2019    Procedure: Right total knee arthroplasty;  Surgeon: Prasanth Jensen MD;  Location: [facility] OR    BACK SURGERY  Oct 2015    Fusion L4-5, laminectomy L2, L3    BYPASS GRAFT ARTERY CORONARY  10/2013    reimplantation of radial artery graft to RCA    CARDIAC CATHERIZATION  11/2005    Stent placed to RCA     CARDIAC CATHERIZATION  04/2013    Occluded RCA, patent LIMA to LAD and radial graft to PDA    CARPAL TUNNEL RELEASE RT/LT  1994    COLONOSCOPY  8-22-11    CV CORONARY ANGIOGRAM N/A 11/14/2024    Procedure: Coronary Angiogram;  Surgeon: Ebenezer Wyatt MD;  Location:  [facility]    CV PCI N/A 11/14/2024    Procedure: Percutaneous Coronary Intervention;  Surgeon: Ebenezer Wyatt MD;  Location:  [facility]    CYSTOSCOPY FLEXIBLE  10/16/2013    Procedure: CYSTOSCOPY FLEXIBLE;  FLEXIBLE CYSTOSCOPY / DILATION OF URETHRA / INSERTION OF LESLIE;  Surgeon: Cooper Wallace MD;  Location: [facility]    ENDOVASCULAR REPAIR, INFRARENAL ABDOMINAL AORTIC ANEURYSM/DISSECTION; MODULAR BIFURCATED PROSTHESIS  2006    AAA repair endovascular    ENT SURGERY      EP ABLATION ATRIAL FLUTTER N/A 4/22/2019    Procedure: EP Ablation Atrial Flutter;  Surgeon: Jessy Vasquez MD;  Location:  [facility]    EP PACEMAKER DEVICE & LEAD IMPLANT- RIGHT ATRIAL & RIGHT VENTRICULAR N/A 8/2/2022    Procedure: Pacemaker Device & Lead Implant - Right Atrial & Right Ventricular;  Surgeon: Jessy Vasquez MD;  Location:  [facility]    GENITOURINARY SURGERY  6/16/08    radioactive seeding    GRAFT FLAP PEDICLE EXTREMITY (LOCATION) Right 11/12/2019    Procedure: SURGICAL PROCUREMENT, FLAP, PEDICLE, EXTREMITY;  Surgeon: Sara Martinez MD;  Location:  OR    GRAFT SKIN SPLIT THICKNESS FROM EXTREMITY Right 11/12/2019    Procedure: RIGHT GASTRONEMIUS FLAP TO RIGHT KNEE, SPLIT THICKNESS SKIN GRAFT FROM RIGHT THIGH TO RIGHT KNEE, SURGICAL PROCUREMENT, FLAP, PEDICLE, EXTREMITY, WOUND VAC PLACEMENT;  Surgeon: Sara Martinez MD;  Location:  OR    HEAD & NECK SURGERY  1997    vocal cord polypectomy    INCISION AND DRAINAGE KNEE, COMBINED Right 8/29/2019    Procedure: INCISION AND DRAINAGE, KNEE W/ Secondary Wound Closure;  Surgeon: Prasanth Jensen MD;   Location: [facility]    IRRIGATION AND DEBRIDEMENT KNEE, PLACE ANTIBIOTIC CEMENT BEADS / SPACE Right 2/12/2020    Procedure: 1. Irrigation and debridement of wound breakdown, right total knee arthroplasty.  2. Irrigation and debridement of right total knee with placement of antibiotic-impregnated (vancomycin) absorbable antibiotic beads.;  Surgeon: Prasanth Jensen MD;  Location: [facility]    IRRIGATION AND DEBRIDEMENT LOWER EXTREMITY, COMBINED Right 12/8/2019    Procedure: DEBRIDEMENT OF RIGHT CALF AND WOUND CLOSURE.;  Surgeon: Sara Martinez MD;  Location: [facility] OR    IRRIGATION AND DEBRIDEMENT UPPER EXTREMITY, COMBINED Right 1/7/2023    Procedure: Right elbow arthrotomy, irrigation and debridement;  Surgeon: Jose A Christine MD;  Location:  OR    KNEE SURGERY  2001 Right knee arthroscopy    OPTICAL TRACKING SYSTEM FUSION SPINE POSTERIOR LUMBAR THREE+ LEVELS N/A 10/29/2015    Procedure: OPTICAL TRACKING SYSTEM FUSION SPINE POSTERIOR LUMBAR THREE+ LEVELS;  Surgeon: Walt Garcia MD;  Location:  OR    PROSTATE SURGERY      radioactive seeding 6/16/08    REPAIR ANEURYSM ABDOMINAL AORTA  06/08    REPAIR VALVE MITRAL  10/16/2013    SJM 21([id number]):AVR, SJM 27 :MVRProcedure: REPAIR VALVE MITRAL;  REDO STERNOTOMY/REDO AORTIC VALVE REPLACEMENT/ MITRAL VALVE REPLACEMENT/REIMPLANTATION OF RIGHT CORONARY ARTERY BYPASS WITH RACHAEL ( ON PUMP);  Surgeon: Viet Singh MD;  Location:  OR    REPLACE VALVE AORTIC  10/16/2013    Procedure: REPLACE VALVE AORTIC;;  Surgeon: Viet Singh MD;  Location:  OR    SURGERY GENERAL IP CONSULT  05/2008 Excision aneurysm abdominal aorta    SURGERY GENERAL IP CONSULT  1997 Vocal cord polypectomy    VASCULAR SURGERY  1968, 1993     varicose vein stripping    [facility] CABG, VEIN, TWO  1/3/06    Left radial to RCA, LIMA to LAD (RA to RCA reimplanted at time of 2013 surg)       FAMILY HISTORY:  Family History   Problem Relation Age of Onset    No Known Problems  Mother     Coronary Artery Disease Father         CABG    Heart Disease Father         Pacemaker    No Known Problems Brother     No Known Problems Sister     No Known Problems Son     Other Cancer Daughter     No Known Problems Daughter     Heart Disease Brother     Other - See Comments Grandchild        SOCIAL HISTORY:  Social History     Socioeconomic History    Marital status:      Spouse name: None    Number of children: None    Years of education: None    Highest education level: None   Tobacco Use    Smoking status: Former     Current packs/day: 0.00     Average packs/day: 1 pack/day for 40.5 years (40.5 ttl pk-yrs)     Types: Cigarettes     Start date: 4/15/1962     Quit date: 10/23/2002     Years since quittin.1    Smokeless tobacco: Never   Vaping Use    Vaping status: Never Used   Substance and Sexual Activity    Alcohol use: Yes     Comment: a couple beers per week (socially)    Drug use: No    Sexual activity: Not Currently     Partners: Female   Other Topics Concern    Parent/sibling w/ CABG, MI or angioplasty before 65F 55M? Yes     Comment: Brother had bypass at 55    Caffeine Concern No     Comment: 6-8 cups of half and half per day    Special Diet No    Exercise No     Social Drivers of Health     Financial Resource Strain: Low Risk  (2024)    Financial Resource Strain     Within the past 12 months, have you or your family members you live with been unable to get utilities (heat, electricity) when it was really needed?: No   Food Insecurity: Low Risk  (2024)    Food Insecurity     Within the past 12 months, did you worry that your food would run out before you got money to buy more?: No     Within the past 12 months, did the food you bought just not last and you didn t have money to get more?: No   Transportation Needs: Low Risk  (2024)    Transportation Needs     Within the past 12 months, has lack of transportation kept you from medical appointments, getting your  "medicines, non-medical meetings or appointments, work, or from getting things that you need?: No   Physical Activity: Unknown (11/4/2024)    Exercise Vital Sign     Days of Exercise per Week: Patient declined     Minutes of Exercise per Session: 20 min   Stress: No Stress Concern Present (11/4/2024)    [employer] - Occupational Stress Questionnaire     Feeling of Stress : Not at all   Social Connections: Unknown (11/4/2024)    Social Connection and Isolation Panel [NHANES]     Frequency of Social Gatherings with Friends and Family: Twice a week   Interpersonal Safety: Low Risk  (11/4/2024)    Interpersonal Safety     Do you feel physically and emotionally safe where you currently live?: Yes     Within the past 12 months, have you been hit, slapped, kicked or otherwise physically hurt by someone?: No     Within the past 12 months, have you been humiliated or emotionally abused in other ways by your partner or ex-partner?: No   Housing Stability: Low Risk  (11/13/2024)    Housing Stability     Do you have housing? : Yes     Are you worried about losing your housing?: No       Review of Systems:  Skin:        Eyes:       ENT:       Respiratory:  Positive for sleep apnea, CPAP  Cardiovascular:  Negative    Gastroenterology:      Genitourinary:       Musculoskeletal:       Neurologic:       Psychiatric:       Heme/Lymph/Imm:       Endocrine:         Physical Exam:  Vitals: /62 (BP Location: Right arm, Patient Position: Sitting, Cuff Size: Adult Regular)   Pulse 70   Ht 1.651 m (5' 5\")   Wt 90.7 kg (199 lb 14.4 oz)   SpO2 98%   BMI 33.27 kg/m   Orthostatic Vitals from 12/17/24 0853 to 12/19/24 0853    Date and Time Orthostatic BP Orthostatic Pulse Patient Position BP   Location Cuff Size   12/19/24 0758 -- -- Sitting Right arm Adult Regular         Constitutional:           Skin:  no rash    Head: non tramatic    Eyes: non icteric    Lymph:no cervi monica lymph nodes    ENT:       "     Neck: normal carotid upstroke    Respiratory:        clear lungs  Cardiac:RRR (paced) good mech click, min syst murmur    GI:  +BS    Extremities and Muscular Skeletal: trace bilat ankle edema    Neurological:  non focal     Psych: A and Ox3    Recent Lab Results:  LIPID RESULTS:  Lab Results   Component Value Date    CHOL 124 11/14/2024    CHOL 152 06/01/2020    HDL 30 (L) 11/14/2024    HDL 34 (L) 06/01/2020    LDL 76 11/14/2024    LDL 90 06/01/2020    TRIG 92 11/14/2024    TRIG 138 06/01/2020    CHOLHDLRATIO 3.6 06/03/2015       LIVER ENZYME RESULTS:  Lab Results   Component Value Date    AST 36 12/10/2024    AST 19 06/01/2020    ALT 26 12/10/2024    ALT 16 06/01/2020       CBC RESULTS:  Lab Results   Component Value Date    WBC 8.9 11/16/2024    WBC 7.6 06/15/2020    RBC 4.30 (L) 11/16/2024    RBC 4.44 06/15/2020    HGB 12.7 (L) 11/16/2024    HGB 13.0 (L) 06/15/2020    HCT 37.4 (L) 11/16/2024    HCT 40.0 06/15/2020    MCV 87 11/16/2024    MCV 90 06/15/2020    MCH 29.5 11/16/2024    MCH 29.3 06/15/2020    MCHC 34.0 11/16/2024    MCHC 32.5 06/15/2020    RDW 13.2 11/16/2024    RDW 13.3 06/15/2020     11/16/2024     06/15/2020       BMP RESULTS:  Lab Results   Component Value Date     12/10/2024     06/01/2020    POTASSIUM 4.5 12/10/2024    POTASSIUM 4.0 08/03/2022    POTASSIUM 4.0 06/01/2020    CHLORIDE 102 12/10/2024    CHLORIDE 107 08/03/2022    CHLORIDE 106 06/01/2020    CO2 25 12/10/2024    CO2 27 08/03/2022    CO2 26 06/01/2020    ANIONGAP 11 12/10/2024    ANIONGAP 6 08/03/2022    ANIONGAP 4 06/01/2020     (H) 12/10/2024     (H) 01/08/2023     (H) 08/03/2022     (H) 06/01/2020    BUN 16.3 12/10/2024    BUN 15 08/03/2022    BUN 13 06/01/2020    CR 0.96 12/10/2024    CR 0.73 06/01/2020    GFRESTIMATED 78 12/10/2024    GFRESTIMATED >60 10/12/2022    GFRESTIMATED 72 10/06/2020    GFRESTBLACK 87 10/06/2020    AWILDA 10.0 12/10/2024    AWILDA 8.5 06/01/2020         A1C RESULTS:  Lab Results   Component Value Date    A1C 6.8 (H) 12/10/2024    A1C 5.9 04/25/2017       INR RESULTS:  Lab Results   Component Value Date    INR 3.3 (H) 12/10/2024    INR 4.1 (H) 11/29/2024    INR 1.37 (H) 11/16/2024    INR 1.35 (H) 11/15/2024    INR 2.00 (H) 07/08/2021    INR 1.80 (H) 06/17/2021           CC  Calderon Santo MD  [id number]  [place] [id number]

## 2024-12-23 ENCOUNTER — HOSPITAL ENCOUNTER (OUTPATIENT)
Dept: CARDIAC REHAB | Facility: CLINIC | Age: 84
Discharge: HOME | End: 2024-12-23
Attending: STUDENT IN AN ORGANIZED HEALTH CARE EDUCATION/TRAINING PROGRAM
Payer: MEDICARE

## 2024-12-23 PROCEDURE — 93798 PHYS/QHP OP CAR RHAB W/ECG: CPT

## 2024-12-26 ENCOUNTER — ANTICOAGULATION THERAPY VISIT (OUTPATIENT)
Dept: ANTICOAGULATION | Facility: CLINIC | Age: 84
End: 2024-12-26

## 2024-12-26 ENCOUNTER — LAB (OUTPATIENT)
Dept: LAB | Facility: CLINIC | Age: 84
End: 2024-12-26
Payer: MEDICARE

## 2024-12-26 DIAGNOSIS — Z79.01 LONG TERM CURRENT USE OF ANTICOAGULANT THERAPY: ICD-10-CM

## 2024-12-26 DIAGNOSIS — Z95.2 S/P MITRAL VALVE REPLACEMENT: ICD-10-CM

## 2024-12-26 DIAGNOSIS — I48.11 LONGSTANDING PERSISTENT ATRIAL FIBRILLATION (H): ICD-10-CM

## 2024-12-26 DIAGNOSIS — Z95.2 S/P AORTIC VALVE REPLACEMENT: ICD-10-CM

## 2024-12-26 DIAGNOSIS — Z95.2 S/P MITRAL VALVE REPLACEMENT: Primary | ICD-10-CM

## 2024-12-26 LAB — INR BLD: 1.1 (ref 0.9–1.1)

## 2024-12-26 RX ORDER — ENOXAPARIN SODIUM 100 MG/ML
90 INJECTION SUBCUTANEOUS EVERY 12 HOURS
Qty: 28 ML | Refills: 1 | Status: CANCELLED
Start: 2024-12-26

## 2024-12-26 NOTE — PROGRESS NOTES
"Estimated Creatinine Clearance: 60.4 mL/min (based on SCr of 0.96 mg/dL).  Estimated body mass index is 33.27 kg/m  as calculated from the following:    Height as of 12/19/24: 1.651 m (5' 5\").    Weight as of 12/19/24: 90.7 kg (199 lb 14.4 oz).    Advised bridging with enoxaparin 90 mg subcutaneous Q12h until INR >/= 2.5 due to dual valves.     Irma Salas, FannyD, BCPS    "

## 2024-12-26 NOTE — PROGRESS NOTES
ANTICOAGULATION MANAGEMENT     Fausto Farr 84 year old male is on warfarin with subtherapeutic INR result. (Goal INR 2.5-3.5)    Recent labs: (last 7 days)     12/26/24  0905   INR 1.1     ASSESSMENT     Source(s): Chart Review and Patient/Caregiver Call     Warfarin doses taken: Warfarin taken as instructed  Diet: No new diet changes identified  Medication/supplement changes:  Cardiologist office visit on 12/19/24-metoprolol dose decreased from 75 mg to 50 mg daily  New illness, injury, or hospitalization: No  Signs or symptoms of bleeding or clotting: No  Previous result: Supratherapeutic  Additional findings:  patient denies any missed warfarin doses or any other changes. He already took a lovenox injection this morning once he learned his INR was grossly subtherapeutic. Patient advised to continue 90 mg lovenox every 12 hours until INR is therapeutic, patient verbalized understanding.  Patient reports he has 9 syringes left, does not need lovenox refilled at this time.       PLAN     Recommended plan for no diet, medication or health factor changes affecting INR     Dosing Instructions:  booster dose of 10 mg x 2 days then continue your current warfarin dose with next INR in 4 days       Summary  As of 12/26/2024      Full warfarin instructions:  12/26: 10 mg; 12/27: 10 mg; Otherwise 7.5 mg every Sun, Wed; 5 mg all other days   Next INR check:  12/30/2024               Telephone call with Ralph who verbalizes understanding and agrees to plan and who agrees to plan and repeated back plan correctly    Lab visit scheduled    Education provided: Taking warfarin: Importance of taking warfarin as instructed  Lovenox/Heparin education provided: prescribed dose and frequency     Plan made with [facility] Pharmacist Irma Sampson, RN  12/26/2024  Anticoagulation Clinic  [employer] for routing messages: joel DOEWLL  [facility] patient phone line:  [phone]        _______________________________________________________________________     Anticoagulation Episode Summary       Current INR goal:  2.5-3.5   TTR:  41.4% (1 y)   Target end date:  Indefinite   Send INR reminders to:  SHANNA DOWELL    Indications    S/P mitral valve replacement [Z95.2]  Long term current use of anticoagulant therapy [Z79.01]  Longstanding persistent atrial fibrillation (H) [I48.11]             Comments:  Per 9/20/22 Cardio Note strict INR goal of 2.5-3.5. If INR falls below 2.5 at any time, needs to be bridged with Lovenox. consent to leave DVM              Anticoagulation Care Providers       Provider Role Specialty Phone number    Jodi Flower Mai, MD Referring Internal Medicine - Pediatrics [phone]    Kerwin Ramos MD Referring Internal Medicine [phone]

## 2024-12-27 ENCOUNTER — HOSPITAL ENCOUNTER (OUTPATIENT)
Dept: CARDIAC REHAB | Facility: CLINIC | Age: 84
Discharge: HOME | End: 2024-12-27
Attending: STUDENT IN AN ORGANIZED HEALTH CARE EDUCATION/TRAINING PROGRAM
Payer: MEDICARE

## 2024-12-27 PROCEDURE — 93798 PHYS/QHP OP CAR RHAB W/ECG: CPT

## 2024-12-30 ENCOUNTER — ANTICOAGULATION THERAPY VISIT (OUTPATIENT)
Dept: ANTICOAGULATION | Facility: CLINIC | Age: 84
End: 2024-12-30

## 2024-12-30 ENCOUNTER — LAB (OUTPATIENT)
Dept: LAB | Facility: CLINIC | Age: 84
End: 2024-12-30
Payer: MEDICARE

## 2024-12-30 ENCOUNTER — HOSPITAL ENCOUNTER (OUTPATIENT)
Dept: CARDIAC REHAB | Facility: CLINIC | Age: 84
Discharge: HOME | End: 2024-12-30
Attending: STUDENT IN AN ORGANIZED HEALTH CARE EDUCATION/TRAINING PROGRAM
Payer: MEDICARE

## 2024-12-30 DIAGNOSIS — Z79.01 LONG TERM CURRENT USE OF ANTICOAGULANT THERAPY: ICD-10-CM

## 2024-12-30 DIAGNOSIS — Z95.2 S/P MITRAL VALVE REPLACEMENT: Primary | ICD-10-CM

## 2024-12-30 DIAGNOSIS — I48.11 LONGSTANDING PERSISTENT ATRIAL FIBRILLATION (H): ICD-10-CM

## 2024-12-30 DIAGNOSIS — Z95.2 S/P AORTIC VALVE REPLACEMENT: ICD-10-CM

## 2024-12-30 DIAGNOSIS — Z95.2 S/P MITRAL VALVE REPLACEMENT: ICD-10-CM

## 2024-12-30 LAB — INR BLD: 2.2 (ref 0.9–1.1)

## 2024-12-30 PROCEDURE — 36416 COLLJ CAPILLARY BLOOD SPEC: CPT

## 2024-12-30 PROCEDURE — 93798 PHYS/QHP OP CAR RHAB W/ECG: CPT | Mod: KX

## 2024-12-30 PROCEDURE — 85610 PROTHROMBIN TIME: CPT

## 2024-12-30 NOTE — PROGRESS NOTES
ANTICOAGULATION MANAGEMENT     Fausto Farr 84 year old male is on warfarin with subtherapeutic INR result. (Goal INR 2.5-3.5)    Recent labs: (last 7 days)     12/30/24  1049   INR 2.2*       ASSESSMENT     Source(s): Chart Review and Patient/Caregiver Call     Warfarin doses taken: Warfarin taken as instructed - confirmed double booster doses on 12/26 and 12/27  Diet: No new diet changes identified  Medication/supplement changes: None noted  New illness, injury, or hospitalization: No  Signs or symptoms of bleeding or clotting: Yes: nosebleed on 12/28. States he stopped lovenox after this happened. He is not interested in resuming bridge as advised.  Previous result: Subtherapeutic  Additional findings: Scheduled in [place] for 1/3 but at cardiac rehab this day, Ralph will check location of [place] lab. He may elect to use for subsequent visits.        PLAN     Recommended plan for temporary change(s) affecting INR     Dosing Instructions: Continue your current warfarin dose with next INR in 4 days       Summary  As of 12/30/2024      Full warfarin instructions:  7.5 mg every Sun, Wed; 5 mg all other days   Next INR check:  1/3/2025               Telephone call with Ralph who verbalizes understanding and agrees to plan    Lab visit scheduled    Education provided: Please call back if any changes to your diet, medications or how you've been taking warfarin  Lovenox/Heparin education provided: role of enoxaparin/heparin in bridge therapy     Plan made with [facility] Pharmacist Irma Rios RN  12/30/2024  Anticoagulation Clinic  [facility] for routing messages: joel DOWELL  [facility] patient phone line: [phone]        _______________________________________________________________________     Anticoagulation Episode Summary       Current INR goal:  2.5-3.5   TTR:  41.4% (1 y)   Target end date:  Indefinite   Send INR reminders to:  SHANNA DOWELL    Indications    S/P mitral valve  replacement [Z95.2]  Long term current use of anticoagulant therapy [Z79.01]  Longstanding persistent atrial fibrillation (H) [I48.11]             Comments:  Per 9/20/22 Cardio Note strict INR goal of 2.5-3.5. If INR falls below 2.5 at any time, needs to be bridged with Lovenox. consent to leave DVM              Anticoagulation Care Providers       Provider Role Specialty Phone number    Jodi Flower Mai, MD Referring Internal Medicine - Pediatrics [phone]    Kerwin Ramos MD Referring Internal Medicine [phone]

## 2025-01-03 ENCOUNTER — HOSPITAL ENCOUNTER (OUTPATIENT)
Dept: CARDIAC REHAB | Facility: CLINIC | Age: 85
Discharge: HOME | End: 2025-01-03
Attending: STUDENT IN AN ORGANIZED HEALTH CARE EDUCATION/TRAINING PROGRAM
Payer: MEDICARE

## 2025-01-03 PROCEDURE — 93798 PHYS/QHP OP CAR RHAB W/ECG: CPT | Mod: KX

## 2025-01-06 ENCOUNTER — HOSPITAL ENCOUNTER (OUTPATIENT)
Dept: CARDIAC REHAB | Facility: CLINIC | Age: 85
Discharge: HOME | End: 2025-01-06
Attending: STUDENT IN AN ORGANIZED HEALTH CARE EDUCATION/TRAINING PROGRAM
Payer: MEDICARE

## 2025-01-06 PROCEDURE — 93798 PHYS/QHP OP CAR RHAB W/ECG: CPT | Mod: KX | Performed by: REHABILITATION PRACTITIONER

## 2025-01-09 ENCOUNTER — ANTICOAGULATION THERAPY VISIT (OUTPATIENT)
Dept: ANTICOAGULATION | Facility: CLINIC | Age: 85
End: 2025-01-09

## 2025-01-09 ENCOUNTER — LAB (OUTPATIENT)
Dept: LAB | Facility: CLINIC | Age: 85
End: 2025-01-09
Payer: MEDICARE

## 2025-01-09 ENCOUNTER — HOSPITAL ENCOUNTER (OUTPATIENT)
Dept: CARDIAC REHAB | Facility: CLINIC | Age: 85
Discharge: HOME | End: 2025-01-09
Attending: STUDENT IN AN ORGANIZED HEALTH CARE EDUCATION/TRAINING PROGRAM
Payer: MEDICARE

## 2025-01-09 DIAGNOSIS — Z95.2 S/P MITRAL VALVE REPLACEMENT: Primary | ICD-10-CM

## 2025-01-09 DIAGNOSIS — I48.11 LONGSTANDING PERSISTENT ATRIAL FIBRILLATION (H): ICD-10-CM

## 2025-01-09 DIAGNOSIS — Z79.01 LONG TERM CURRENT USE OF ANTICOAGULANT THERAPY: ICD-10-CM

## 2025-01-09 DIAGNOSIS — Z95.2 S/P MITRAL VALVE REPLACEMENT: ICD-10-CM

## 2025-01-09 DIAGNOSIS — Z95.2 S/P AORTIC VALVE REPLACEMENT: ICD-10-CM

## 2025-01-09 LAB — INR BLD: 2.6 (ref 0.9–1.1)

## 2025-01-09 PROCEDURE — 93798 PHYS/QHP OP CAR RHAB W/ECG: CPT

## 2025-01-09 NOTE — PROGRESS NOTES
Left messages on home and cell phone to call [phone].   Cece Rios RN, BSN  Anticoagulation Clinic

## 2025-01-09 NOTE — PROGRESS NOTES
ANTICOAGULATION MANAGEMENT     Fausto Farr 84 year old male is on warfarin with therapeutic INR result. (Goal INR 2.5-3.5)    Recent labs: (last 7 days)     01/09/25  1105   INR 2.6*       ASSESSMENT     Source(s): Chart Review and Patient/Caregiver Call     Warfarin doses taken: Warfarin taken as instructed, patient concerned with resent warfarin dose increase, he thinks it will be too much, will check INR sooner  Diet: No new diet changes identified  Medication/supplement changes: None noted  New illness, injury, or hospitalization: No  Signs or symptoms of bleeding or clotting: No  Previous result: Subtherapeutic  Additional findings: None       PLAN     Recommended plan for no diet, medication or health factor changes affecting INR     Dosing Instructions: Continue your current warfarin dose with next INR in 1 week       Summary  As of 1/9/2025      Full warfarin instructions:  5 mg every Tue, Thu, Sat; 7.5 mg all other days   Next INR check:  1/15/2025               Telephone call with Ralph who verbalizes understanding and agrees to plan    Lab visit scheduled    Education provided: Please call back if any changes to your diet, medications or how you've been taking warfarin  Contact [phone] with any changes, questions or concerns.     Plan made per [facility] anticoagulation protocol    Cece Rios RN  1/9/2025  Anticoagulation Clinic  [facility] for routing messages: joel DOWELL  [facility] patient phone line: [phone]        _______________________________________________________________________     Anticoagulation Episode Summary       Current INR goal:  2.5-3.5   TTR:  39.5% (1 y)   Target end date:  Indefinite   Send INR reminders to:  SHANNA DOWELL    Indications    S/P mitral valve replacement [Z95.2]  Long term current use of anticoagulant therapy [Z79.01]  Longstanding persistent atrial fibrillation (H) [I48.11]             Comments:  Per 9/20/22 Cardio Note strict INR goal of 2.5-3.5. If  INR falls below 2.5 at any time, needs to be bridged with Lovenox. consent to leave DVM              Anticoagulation Care Providers       Provider Role Specialty Phone number    Jodi Flower Mai, MD Referring Internal Medicine - Pediatrics [phone]    Kerwin Ramos MD Referring Internal Medicine [phone]

## 2025-01-13 ENCOUNTER — HOSPITAL ENCOUNTER (OUTPATIENT)
Dept: CARDIAC REHAB | Facility: CLINIC | Age: 85
Discharge: HOME | End: 2025-01-13
Attending: STUDENT IN AN ORGANIZED HEALTH CARE EDUCATION/TRAINING PROGRAM
Payer: MEDICARE

## 2025-01-13 PROCEDURE — 93798 PHYS/QHP OP CAR RHAB W/ECG: CPT | Mod: KX

## 2025-01-15 ENCOUNTER — HOSPITAL ENCOUNTER (OUTPATIENT)
Dept: CARDIAC REHAB | Facility: CLINIC | Age: 85
Discharge: HOME | End: 2025-01-15
Attending: STUDENT IN AN ORGANIZED HEALTH CARE EDUCATION/TRAINING PROGRAM
Payer: MEDICARE

## 2025-01-15 ENCOUNTER — LAB (OUTPATIENT)
Dept: LAB | Facility: CLINIC | Age: 85
End: 2025-01-15
Payer: MEDICARE

## 2025-01-15 ENCOUNTER — ANTICOAGULATION THERAPY VISIT (OUTPATIENT)
Dept: ANTICOAGULATION | Facility: CLINIC | Age: 85
End: 2025-01-15

## 2025-01-15 DIAGNOSIS — Z95.2 S/P MITRAL VALVE REPLACEMENT: ICD-10-CM

## 2025-01-15 DIAGNOSIS — Z95.2 S/P MITRAL VALVE REPLACEMENT: Primary | ICD-10-CM

## 2025-01-15 DIAGNOSIS — Z95.2 S/P AORTIC VALVE REPLACEMENT: ICD-10-CM

## 2025-01-15 DIAGNOSIS — Z79.01 LONG TERM CURRENT USE OF ANTICOAGULANT THERAPY: ICD-10-CM

## 2025-01-15 DIAGNOSIS — I48.11 LONGSTANDING PERSISTENT ATRIAL FIBRILLATION (H): ICD-10-CM

## 2025-01-15 LAB — INR BLD: 2.5 (ref 0.9–1.1)

## 2025-01-15 PROCEDURE — 93798 PHYS/QHP OP CAR RHAB W/ECG: CPT | Mod: KX

## 2025-01-15 PROCEDURE — 85610 PROTHROMBIN TIME: CPT

## 2025-01-15 PROCEDURE — 36416 COLLJ CAPILLARY BLOOD SPEC: CPT

## 2025-01-15 NOTE — PROGRESS NOTES
ANTICOAGULATION MANAGEMENT     Fausto Farr 84 year old male is on warfarin with therapeutic INR result. (Goal INR 2.5-3.5)    Recent labs: (last 7 days)     01/15/25  1045   INR 2.5*       ASSESSMENT     Source(s): Chart Review and Patient/Caregiver Call     Warfarin doses taken: Warfarin taken as instructed  Diet: No new diet changes identified  Medication/supplement changes: None noted  New illness, injury, or hospitalization: No  Signs or symptoms of bleeding or clotting: No  Previous result: Therapeutic last visit; previously outside of goal range  Additional findings: None       PLAN     Recommended plan for no diet, medication or health factor changes affecting INR     Dosing Instructions: Continue your current warfarin dose with next INR in 9 days       Summary  As of 1/15/2025      Full warfarin instructions:  5 mg every Tue, Thu, Sat; 7.5 mg all other days   Next INR check:  1/24/2025               Telephone call with Ralph who agrees to plan and repeated back plan correctly    Lab visit scheduled    Education provided: Please call back if any changes to your diet, medications or how you've been taking warfarin  Contact [phone] with any changes, questions or concerns.     Plan made per [facility] anticoagulation protocol    Cece Rios RN  1/15/2025  Anticoagulation Clinic  [employer] for routing messages: joel DOWELL  [facility] patient phone line: [phone]        _______________________________________________________________________     Anticoagulation Episode Summary       Current INR goal:  2.5-3.5   TTR:  40.8% (1 y)   Target end date:  Indefinite   Send INR reminders to:  SHANNA DOWELL    Indications    S/P mitral valve replacement [Z95.2]  Long term current use of anticoagulant therapy [Z79.01]  Longstanding persistent atrial fibrillation (H) [I48.11]             Comments:  Per 9/20/22 Cardio Note strict INR goal of 2.5-3.5. If INR falls below 2.5 at any time, needs to be bridged with  Lovenox. consent to leave DVM              Anticoagulation Care Providers       Provider Role Specialty Phone number    Jodi Flower Mai, MD Referring Internal Medicine - Pediatrics [phone]    Kerwin Ramos MD Referring Internal Medicine [phone]

## 2025-01-22 ENCOUNTER — HOSPITAL ENCOUNTER (OUTPATIENT)
Dept: CARDIAC REHAB | Facility: CLINIC | Age: 85
Discharge: HOME | End: 2025-01-22
Attending: STUDENT IN AN ORGANIZED HEALTH CARE EDUCATION/TRAINING PROGRAM
Payer: MEDICARE

## 2025-01-22 PROCEDURE — 93798 PHYS/QHP OP CAR RHAB W/ECG: CPT | Mod: KX

## 2025-01-24 ENCOUNTER — HOSPITAL ENCOUNTER (OUTPATIENT)
Dept: CARDIAC REHAB | Facility: CLINIC | Age: 85
Discharge: HOME | End: 2025-01-24
Attending: STUDENT IN AN ORGANIZED HEALTH CARE EDUCATION/TRAINING PROGRAM
Payer: MEDICARE

## 2025-01-24 PROCEDURE — 93798 PHYS/QHP OP CAR RHAB W/ECG: CPT | Mod: KX

## 2025-01-27 ENCOUNTER — HOSPITAL ENCOUNTER (OUTPATIENT)
Dept: CARDIAC REHAB | Facility: CLINIC | Age: 85
Discharge: HOME | End: 2025-01-27
Attending: STUDENT IN AN ORGANIZED HEALTH CARE EDUCATION/TRAINING PROGRAM
Payer: MEDICARE

## 2025-01-27 PROCEDURE — 93798 PHYS/QHP OP CAR RHAB W/ECG: CPT | Mod: KX | Performed by: REHABILITATION PRACTITIONER

## 2025-01-29 ENCOUNTER — TRANSFERRED RECORDS (OUTPATIENT)
Dept: HEALTH INFORMATION MANAGEMENT | Facility: CLINIC | Age: 85
End: 2025-01-29
Payer: MEDICARE

## 2025-01-29 ENCOUNTER — TELEPHONE (OUTPATIENT)
Dept: CARDIOLOGY | Facility: CLINIC | Age: 85
End: 2025-01-29
Payer: MEDICARE

## 2025-01-29 DIAGNOSIS — I50.22 CHRONIC SYSTOLIC CONGESTIVE HEART FAILURE (H): ICD-10-CM

## 2025-01-29 RX ORDER — CLOPIDOGREL BISULFATE 75 MG/1
75 TABLET ORAL DAILY
Qty: 90 TABLET | Refills: 3 | Status: SHIPPED | OUTPATIENT
Start: 2025-01-29

## 2025-01-29 NOTE — TELEPHONE ENCOUNTER
[facility] Call Center    Phone Message    May a detailed message be left on voicemail: yes    Reason for Call: Medication Refill Request    Has the patient contacted the pharmacy for the refill? Yes  Name of medication being requested: clopidogrel (PLAVIX) 75 MG tablet  Provider who prescribed the medication: Hansa  Pharmacy:   [facility] [id number] [place] [address]     Date medication is needed: 1/29/25    Thank you!  [facility]

## 2025-02-04 ENCOUNTER — HOSPITAL ENCOUNTER (OUTPATIENT)
Dept: CARDIAC REHAB | Facility: CLINIC | Age: 85
Discharge: HOME | End: 2025-02-04
Attending: STUDENT IN AN ORGANIZED HEALTH CARE EDUCATION/TRAINING PROGRAM
Payer: MEDICARE

## 2025-02-04 PROCEDURE — 93798 PHYS/QHP OP CAR RHAB W/ECG: CPT

## 2025-02-06 ENCOUNTER — LAB (OUTPATIENT)
Dept: LAB | Facility: CLINIC | Age: 85
End: 2025-02-06
Payer: MEDICARE

## 2025-02-06 ENCOUNTER — ANTICOAGULATION THERAPY VISIT (OUTPATIENT)
Dept: ANTICOAGULATION | Facility: CLINIC | Age: 85
End: 2025-02-06

## 2025-02-06 ENCOUNTER — HOSPITAL ENCOUNTER (OUTPATIENT)
Dept: CARDIAC REHAB | Facility: CLINIC | Age: 85
Discharge: HOME | End: 2025-02-06
Attending: STUDENT IN AN ORGANIZED HEALTH CARE EDUCATION/TRAINING PROGRAM
Payer: MEDICARE

## 2025-02-06 DIAGNOSIS — E11.9 DIABETES MELLITUS, TYPE 2 (H): ICD-10-CM

## 2025-02-06 DIAGNOSIS — Z95.2 S/P MITRAL VALVE REPLACEMENT: ICD-10-CM

## 2025-02-06 DIAGNOSIS — Z95.2 S/P MITRAL VALVE REPLACEMENT: Primary | ICD-10-CM

## 2025-02-06 DIAGNOSIS — Z95.2 S/P AORTIC VALVE REPLACEMENT: ICD-10-CM

## 2025-02-06 DIAGNOSIS — Z79.01 LONG TERM CURRENT USE OF ANTICOAGULANT THERAPY: ICD-10-CM

## 2025-02-06 DIAGNOSIS — I48.11 LONGSTANDING PERSISTENT ATRIAL FIBRILLATION (H): ICD-10-CM

## 2025-02-06 LAB
CREAT UR-MCNC: 104 MG/DL
INR BLD: 2.9 (ref 0.9–1.1)
MICROALBUMIN UR-MCNC: 85.5 MG/L
MICROALBUMIN/CREAT UR: 82.21 MG/G CR (ref 0–17)

## 2025-02-06 PROCEDURE — 93798 PHYS/QHP OP CAR RHAB W/ECG: CPT | Mod: KX

## 2025-02-06 NOTE — PROGRESS NOTES
ANTICOAGULATION MANAGEMENT     Fausto Farr 84 year old male is on warfarin with therapeutic INR result. (Goal INR 2.5-3.5)    Recent labs: (last 7 days)     25  1054   INR 2.9*       ASSESSMENT     Source(s): Chart Review and Patient/Caregiver Call     Warfarin doses taken: More warfarin taken than planned which may be affecting INR and Booster dose(s) recently taken which may be affecting INR  Diet: No new diet changes identified  Medication/supplement changes: None noted  New illness, injury, or hospitalization: No  Signs or symptoms of bleeding or clotting: No  Previous result: Subtherapeutic  Additional findings:  patient reports lab staff informed him his standing INR lab order has , new order placed. [facility] Hollie agreed with warfarin maintenance dose increase.       PLAN     Recommended plan for temporary change(s) affecting INR     Dosing Instructions: Increase your warfarin dose (5.6% change) with next INR in 2 weeks       Summary  As of 2025      Full warfarin instructions:  5 mg every Tue, Sat; 7.5 mg all other days   Next INR check:  2025               Telephone call with Ralph who verbalizes understanding and agrees to plan    Lab visit scheduled    Education provided: Taking warfarin: Importance of taking warfarin as instructed    Plan made with [facility] Pharmacist Hollie Sampson, RN  2025  Anticoagulation Clinic  [facility] for routing messages: joel DOWELL  [facility] patient phone line: [phone]        _______________________________________________________________________     Anticoagulation Episode Summary       Current INR goal:  2.5-3.5   TTR:  38.2% (1 y)   Target end date:  Indefinite   Send INR reminders to:  SHANNA DOWELL    Indications    S/P mitral valve replacement [Z95.2]  Long term current use of anticoagulant therapy [Z79.01]  Longstanding persistent atrial fibrillation (H) [I48.11]             Comments:  Per 22 Cardio Note strict INR goal  of 2.5-3.5. If INR falls below 2.5 at any time, needs to be bridged with Lovenox. consent to leave DVM              Anticoagulation Care Providers       Provider Role Specialty Phone number    Jodi Flower Mai, MD Referring Internal Medicine - Pediatrics [phone]    Kerwin Ramos MD Referring Internal Medicine [phone]

## 2025-02-13 ENCOUNTER — HOSPITAL ENCOUNTER (OUTPATIENT)
Dept: CARDIAC REHAB | Facility: CLINIC | Age: 85
Discharge: HOME | End: 2025-02-13
Attending: STUDENT IN AN ORGANIZED HEALTH CARE EDUCATION/TRAINING PROGRAM
Payer: MEDICARE

## 2025-02-13 PROCEDURE — 93798 PHYS/QHP OP CAR RHAB W/ECG: CPT

## 2025-02-18 ENCOUNTER — HOSPITAL ENCOUNTER (OUTPATIENT)
Dept: CARDIAC REHAB | Facility: CLINIC | Age: 85
Discharge: HOME | End: 2025-02-18
Attending: STUDENT IN AN ORGANIZED HEALTH CARE EDUCATION/TRAINING PROGRAM
Payer: MEDICARE

## 2025-02-18 PROCEDURE — 93798 PHYS/QHP OP CAR RHAB W/ECG: CPT | Mod: KX

## 2025-02-20 ENCOUNTER — LAB (OUTPATIENT)
Dept: LAB | Facility: CLINIC | Age: 85
End: 2025-02-20
Payer: MEDICARE

## 2025-02-20 ENCOUNTER — ANTICOAGULATION THERAPY VISIT (OUTPATIENT)
Dept: ANTICOAGULATION | Facility: CLINIC | Age: 85
End: 2025-02-20

## 2025-02-20 DIAGNOSIS — Z95.2 S/P MITRAL VALVE REPLACEMENT: Primary | ICD-10-CM

## 2025-02-20 DIAGNOSIS — I48.11 LONGSTANDING PERSISTENT ATRIAL FIBRILLATION (H): ICD-10-CM

## 2025-02-20 DIAGNOSIS — Z95.2 S/P MITRAL VALVE REPLACEMENT: ICD-10-CM

## 2025-02-20 DIAGNOSIS — Z79.01 LONG TERM CURRENT USE OF ANTICOAGULANT THERAPY: ICD-10-CM

## 2025-02-20 LAB — INR BLD: 3.1 (ref 0.9–1.1)

## 2025-02-20 NOTE — PROGRESS NOTES
ANTICOAGULATION MANAGEMENT     Fausto Farr 84 year old male is on warfarin with therapeutic INR result. (Goal INR 2.5-3.5)    Recent labs: (last 7 days)     02/20/25  1045   INR 3.1*       ASSESSMENT     Source(s): Chart Review and Patient/Caregiver Call     Warfarin doses taken: Warfarin taken differently, but did not change total weekly dose.  Adjusted weekly warfarin dose to reflect what patient has been taking.  Diet: No new diet changes identified  Medication/supplement changes: None noted  New illness, injury, or hospitalization: No  Signs or symptoms of bleeding or clotting: No  Previous result: Therapeutic last visit; previously outside of goal range  Additional findings: None       PLAN     Recommended plan for no diet, medication or health factor changes affecting INR     Dosing Instructions: Continue your current warfarin dose with next INR in 3 weeks       Summary  As of 2/20/2025      Full warfarin instructions:  5 mg every Tue, Thu; 7.5 mg all other days   Next INR check:  3/13/2025               Telephone call with Ralph who verbalizes understanding and agrees to plan    Lab visit scheduled    Education provided: Please call back if any changes to your diet, medications or how you've been taking warfarin    Plan made per [facility] anticoagulation protocol    Ruthann Sanders RN  2/20/2025  Anticoagulation Clinic  [employer] for routing messages: joel DOWELL  [facility] patient phone line: [phone]        _______________________________________________________________________     Anticoagulation Episode Summary       Current INR goal:  2.5-3.5   TTR:  39.2% (1 y)   Target end date:  Indefinite   Send INR reminders to:  SHANNA DOWELL    Indications    S/P mitral valve replacement [Z95.2]  Long term current use of anticoagulant therapy [Z79.01]  Longstanding persistent atrial fibrillation (H) [I48.11]             Comments:  Per 9/20/22 Cardio Note strict INR goal of 2.5-3.5. If INR falls below 2.5 at any  time, needs to be bridged with Lovenox. consent to leave DVM              Anticoagulation Care Providers       Provider Role Specialty Phone number    Jodi Flower Mai, MD Referring Internal Medicine - Pediatrics [phone]    Kerwin Ramos MD Referring Internal Medicine [phone]

## 2025-02-25 ENCOUNTER — HOSPITAL ENCOUNTER (OUTPATIENT)
Dept: CARDIAC REHAB | Facility: CLINIC | Age: 85
Discharge: HOME | End: 2025-02-25
Attending: STUDENT IN AN ORGANIZED HEALTH CARE EDUCATION/TRAINING PROGRAM
Payer: MEDICARE

## 2025-02-25 PROCEDURE — 93798 PHYS/QHP OP CAR RHAB W/ECG: CPT | Mod: KX

## 2025-03-03 DIAGNOSIS — Z95.2 S/P MITRAL VALVE REPLACEMENT: ICD-10-CM

## 2025-03-03 DIAGNOSIS — Z95.2 S/P AORTIC VALVE REPLACEMENT: ICD-10-CM

## 2025-03-03 DIAGNOSIS — I48.19 PERSISTENT ATRIAL FIBRILLATION (H): ICD-10-CM

## 2025-03-03 RX ORDER — WARFARIN SODIUM 5 MG/1
TABLET ORAL
Qty: 120 TABLET | Refills: 1 | Status: SHIPPED | OUTPATIENT
Start: 2025-03-03

## 2025-03-03 NOTE — TELEPHONE ENCOUNTER
ANTICOAGULATION MANAGEMENT:  Medication Refill    Anticoagulation Summary  As of 2/20/2025      Warfarin maintenance plan:  5 mg (5 mg x 1) every Tue, Thu; 7.5 mg (5 mg x 1.5) all other days   Next INR check:  3/13/2025   Target end date:  Indefinite    Indications    S/P mitral valve replacement [Z95.2]  Long term current use of anticoagulant therapy [Z79.01]  Longstanding persistent atrial fibrillation (H) [I48.11]                 Anticoagulation Care Providers       Provider Role Specialty Phone number    Jodi Flower Mai, MD Referring Internal Medicine - Pediatrics [phone]    Kerwin Ramos MD Referring Internal Medicine [phone]            Refill Criteria    Visit with referring provider/group: Meets criteria: visit within referring provider group in the last 15 months on 11/21/2024    [facility] referral last signed: 11/16/2024; within last year:  Yes    Lab monitoring is up to date (not exceeding 2 weeks overdue): Yes    Fausto meets all criteria for refill. Rx instructions and quantity supplied updated to match patient's current dosing plan. Warfarin 90 day supply with 1 refill granted per [facility] protocol     Aure Sampson RN  Anticoagulation Clinic

## 2025-03-10 DIAGNOSIS — K51.20 ULCERATIVE PROCTITIS WITHOUT COMPLICATION (H): ICD-10-CM

## 2025-03-10 NOTE — TELEPHONE ENCOUNTER
Needs refill.     Has about 1 month left.     He will be sending the prescription to [facility], much cheaper.    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  - mesalamine (ASACOL HD) 800 MG EC tablet; Take 1 tablet (800 mg) by mouth 2 times daily.      Patient needs a paper script signed, once signed, he would like a call to let him know its ready for .      Thank you.     JUSTIN Ramos on 3/10/2025 at 3:37 PM

## 2025-03-11 RX ORDER — MESALAMINE 800 MG/1
1 TABLET, DELAYED RELEASE ORAL 2 TIMES DAILY
Qty: 200 TABLET | Refills: 3 | Status: SHIPPED | OUTPATIENT
Start: 2025-03-11

## 2025-03-12 NOTE — TELEPHONE ENCOUNTER
Patient calling back wanting update on mesalamine (ASACOL HD) rx-    RN informed patient a printed and signed copy of rx is currently at [facility].     Patient would like rx faxed to [facility] in [place] at [phone]. Can pick-up and fax himself at home if needed. He is hoping to have done ASAP as it usually takes up to 4 weeks for medication to be delivered.     Linda SHARMA RN  [facility]

## 2025-03-12 NOTE — TELEPHONE ENCOUNTER
Faxed to [facility] at [phone].    Thank you kindly,  Johnson CAROLINA

## 2025-03-22 ENCOUNTER — HEALTH MAINTENANCE LETTER (OUTPATIENT)
Age: 85
End: 2025-03-22

## 2025-04-03 ENCOUNTER — ANTICOAGULATION THERAPY VISIT (OUTPATIENT)
Dept: ANTICOAGULATION | Facility: CLINIC | Age: 85
End: 2025-04-03

## 2025-04-03 ENCOUNTER — LAB (OUTPATIENT)
Dept: LAB | Facility: CLINIC | Age: 85
End: 2025-04-03
Payer: MEDICARE

## 2025-04-03 DIAGNOSIS — Z95.2 S/P MITRAL VALVE REPLACEMENT: Primary | ICD-10-CM

## 2025-04-03 DIAGNOSIS — I48.11 LONGSTANDING PERSISTENT ATRIAL FIBRILLATION (H): ICD-10-CM

## 2025-04-03 DIAGNOSIS — Z79.01 LONG TERM CURRENT USE OF ANTICOAGULANT THERAPY: ICD-10-CM

## 2025-04-03 DIAGNOSIS — Z95.2 S/P MITRAL VALVE REPLACEMENT: ICD-10-CM

## 2025-04-03 LAB — INR BLD: 3.7 (ref 0.9–1.1)

## 2025-04-03 NOTE — PROGRESS NOTES
ANTICOAGULATION MANAGEMENT     Fausto Farr 84 year old male is on warfarin with supratherapeutic INR result. (Goal INR 2.5-3.5)    Recent labs: (last 7 days)     04/03/25  0838   INR 3.7*       ASSESSMENT     Source(s): Chart Review and Patient/Caregiver Call     Warfarin doses taken: Booster dose(s) recently taken which may be affecting INR, patient confirms warfarin boost dose taken 3/28/25  Diet: No new diet changes identified  Medication/supplement changes: None noted  New illness, injury, or hospitalization: Yes - Patient reports shoulders are sore today after washing walls, continues with wrist pain  Signs or symptoms of bleeding or clotting: No  Previous result: Subtherapeutic  Additional findings: None       PLAN     Recommended plan for temporary change(s) affecting INR     Dosing Instructions: Continue your current warfarin dose with next INR in 2 weeks       Summary  As of 4/3/2025      Full warfarin instructions:  5 mg every Tue, Thu, Sat; 7.5 mg all other days   Next INR check:  4/17/2025               Telephone call with Ralph who agrees to plan and repeated back plan correctly    Lab visit scheduled    Education provided: Please call back if any changes to your diet, medications or how you've been taking warfarin  Contact [phone] with any changes, questions or concerns.     Plan made per [facility] anticoagulation protocol    Cece Rios RN  4/3/2025  Anticoagulation Clinic  [facility] for routing messages: joel DOWELL  [facility] patient phone line: [phone]        _______________________________________________________________________     Anticoagulation Episode Summary       Current INR goal:  2.5-3.5   TTR:  45.0% (1 y)   Target end date:  Indefinite   Send INR reminders to:  SHANNA DOWELL    Indications    S/P mitral valve replacement [Z95.2]  Long term current use of anticoagulant therapy [Z79.01]  Longstanding persistent atrial fibrillation (H) [I48.11]             Comments:  Per  9/20/22 Cardio Note strict INR goal of 2.5-3.5. If INR falls below 2.5 at any time, needs to be bridged with Lovenox. consent to leave DVM              Anticoagulation Care Providers       Provider Role Specialty Phone number    Jodi Flower Mai, MD Referring Internal Medicine - Pediatrics [phone]    Kerwin Ramos MD Referring Internal Medicine [phone]

## 2025-04-08 ENCOUNTER — TRANSFERRED RECORDS (OUTPATIENT)
Dept: HEALTH INFORMATION MANAGEMENT | Facility: CLINIC | Age: 85
End: 2025-04-08
Payer: MEDICARE

## 2025-04-15 ENCOUNTER — TRANSFERRED RECORDS (OUTPATIENT)
Dept: HEALTH INFORMATION MANAGEMENT | Facility: CLINIC | Age: 85
End: 2025-04-15
Payer: MEDICARE

## 2025-04-17 ENCOUNTER — ANTICOAGULATION THERAPY VISIT (OUTPATIENT)
Dept: ANTICOAGULATION | Facility: CLINIC | Age: 85
End: 2025-04-17

## 2025-04-17 ENCOUNTER — LAB (OUTPATIENT)
Dept: LAB | Facility: CLINIC | Age: 85
End: 2025-04-17
Payer: MEDICARE

## 2025-04-17 DIAGNOSIS — Z79.01 LONG TERM CURRENT USE OF ANTICOAGULANT THERAPY: ICD-10-CM

## 2025-04-17 DIAGNOSIS — I48.11 LONGSTANDING PERSISTENT ATRIAL FIBRILLATION (H): ICD-10-CM

## 2025-04-17 DIAGNOSIS — Z95.2 S/P MITRAL VALVE REPLACEMENT: ICD-10-CM

## 2025-04-17 DIAGNOSIS — Z95.2 S/P MITRAL VALVE REPLACEMENT: Primary | ICD-10-CM

## 2025-04-17 LAB — INR BLD: 4.1 (ref 0.9–1.1)

## 2025-04-17 NOTE — PROGRESS NOTES
ANTICOAGULATION MANAGEMENT     Fausto Farr 84 year old male is on warfarin with supratherapeutic INR result. (Goal INR 2.5-3.5)    Recent labs: (last 7 days)     04/17/25  0911   INR 4.1*       ASSESSMENT     Source(s): Chart Review and Patient/Caregiver Call     Warfarin doses taken: Warfarin taken as instructed  Diet: No new diet changes identified  Medication/supplement changes:  Patient reports he had cortisone injection in his left wrist 4/14 or 4/15/25  New illness, injury, or hospitalization: No, patient reports shoulder is feeling better after seeing the chiropractor   Signs or symptoms of bleeding or clotting: No  Previous result: Supratherapeutic  Additional findings: Patient reports he will be leaving for [place] 5/10/25       PLAN     Recommended plan for no diet, medication or health factor changes affecting INR     Dosing Instructions: partial hold then decrease your warfarin dose (5.6% change) with next INR in 1-2 weeks       Summary  As of 4/17/2025      Full warfarin instructions:  4/17: 2.5 mg; Otherwise 7.5 mg every Mon, Wed, Fri; 5 mg all other days   Next INR check:  4/29/2025               Telephone call with Ralph who agrees to plan and repeated back plan correctly    Lab visit scheduled    Education provided: Please call back if any changes to your diet, medications or how you've been taking warfarin  Contact [phone] with any changes, questions or concerns.     Plan made per [facility] anticoagulation protocol    Cece Rios RN  4/17/2025  Anticoagulation Clinic  [facility] for routing messages: joel DOWELL  [facility] patient phone line: [phone]        _______________________________________________________________________     Anticoagulation Episode Summary       Current INR goal:  2.5-3.5   TTR:  45.0% (1 y)   Target end date:  Indefinite   Send INR reminders to:  SHANNA DOWELL    Indications    S/P mitral valve replacement [Z95.2]  Long term current use of anticoagulant therapy  [Z79.01]  Longstanding persistent atrial fibrillation (H) [I48.11]             Comments:  Per 9/20/22 Cardio Note strict INR goal of 2.5-3.5. If INR falls below 2.5 at any time, needs to be bridged with Lovenox. consent to leave DVM              Anticoagulation Care Providers       Provider Role Specialty Phone number    Jodi Flower Mai, MD Referring Internal Medicine - Pediatrics [phone]    Kerwin Ramos MD Referring Internal Medicine [phone]

## 2025-04-29 ENCOUNTER — ANTICOAGULATION THERAPY VISIT (OUTPATIENT)
Dept: ANTICOAGULATION | Facility: CLINIC | Age: 85
End: 2025-04-29

## 2025-04-29 ENCOUNTER — LAB (OUTPATIENT)
Dept: LAB | Facility: CLINIC | Age: 85
End: 2025-04-29
Payer: MEDICARE

## 2025-04-29 DIAGNOSIS — I48.11 LONGSTANDING PERSISTENT ATRIAL FIBRILLATION (H): ICD-10-CM

## 2025-04-29 DIAGNOSIS — Z95.2 S/P MITRAL VALVE REPLACEMENT: Primary | ICD-10-CM

## 2025-04-29 DIAGNOSIS — Z79.01 LONG TERM CURRENT USE OF ANTICOAGULANT THERAPY: ICD-10-CM

## 2025-04-29 DIAGNOSIS — Z95.2 S/P MITRAL VALVE REPLACEMENT: ICD-10-CM

## 2025-04-29 LAB — INR BLD: 3.6 (ref 0.9–1.1)

## 2025-04-29 PROCEDURE — 36416 COLLJ CAPILLARY BLOOD SPEC: CPT

## 2025-04-29 PROCEDURE — 85610 PROTHROMBIN TIME: CPT

## 2025-04-29 NOTE — PROGRESS NOTES
ANTICOAGULATION MANAGEMENT     Fausto Farr 84 year old male is on warfarin with supratherapeutic INR result. (Goal INR 2.5-3.5)    Recent labs: (last 7 days)     04/29/25  0848   INR 3.6*           ASSESSMENT     Source(s): Chart Review and Patient/Caregiver Call     Warfarin doses taken: Held for partial dose  recently which may be affecting INR  Diet: No new diet changes identified  Medication/supplement changes: None noted  New illness, injury, or hospitalization: No  Signs or symptoms of bleeding or clotting: No  Previous result: Supratherapeutic  Additional findings: Warfarin maintenance dose was decreased 5.6% at last visit. Patient prefers another maintenance dose decrease rather than increasing vitamin K intake.  Patient traveling by car to [place] 5/10-5/17/25.       PLAN     Recommended plan for no diet, medication or health factor changes affecting INR     Dosing Instructions: decrease your warfarin dose (5.9% change) with next INR in 3 weeks due to travel       Summary  As of 4/29/2025      Full warfarin instructions:  7.5 mg every Mon, Fri; 5 mg all other days   Next INR check:  5/19/2025               Telephone call with Ralph who verbalizes understanding and agrees to plan and who agrees to plan and repeated back plan correctly    Lab visit scheduled    Education provided: Taking warfarin: Importance of taking warfarin as instructed  Dietary considerations: importance of consistent vitamin K intake    Plan made per [facility] anticoagulation protocol    Aure Sampson RN  4/29/2025  Anticoagulation Clinic  [facility] for routing messages: joel DOWELL  [facility] patient phone line: [phone]        _______________________________________________________________________     Anticoagulation Episode Summary       Current INR goal:  2.5-3.5   TTR:  45.0% (1 y)   Target end date:  Indefinite   Send INR reminders to:  SHANNA DOWELL    Indications    S/P mitral valve replacement [Z95.2]  Long term current use  of anticoagulant therapy [Z79.01]  Longstanding persistent atrial fibrillation (H) [I48.11]             Comments:  Per 9/20/22 Cardio Note strict INR goal of 2.5-3.5. If INR falls below 2.5 at any time, needs to be bridged with Lovenox. consent to leave DVM              Anticoagulation Care Providers       Provider Role Specialty Phone number    Jodi Flower Mai, MD Referring Internal Medicine - Pediatrics [phone]    Kerwin Ramos MD Referring Internal Medicine [phone]

## 2025-05-19 ENCOUNTER — RESULTS FOLLOW-UP (OUTPATIENT)
Dept: ANTICOAGULATION | Facility: CLINIC | Age: 85
End: 2025-05-19

## 2025-05-19 ENCOUNTER — ANTICOAGULATION THERAPY VISIT (OUTPATIENT)
Dept: ANTICOAGULATION | Facility: CLINIC | Age: 85
End: 2025-05-19

## 2025-05-19 ENCOUNTER — LAB (OUTPATIENT)
Dept: LAB | Facility: CLINIC | Age: 85
End: 2025-05-19
Payer: MEDICARE

## 2025-05-19 DIAGNOSIS — Z79.01 LONG TERM CURRENT USE OF ANTICOAGULANT THERAPY: ICD-10-CM

## 2025-05-19 DIAGNOSIS — I48.11 LONGSTANDING PERSISTENT ATRIAL FIBRILLATION (H): ICD-10-CM

## 2025-05-19 DIAGNOSIS — Z95.2 S/P MITRAL VALVE REPLACEMENT: Primary | ICD-10-CM

## 2025-05-19 DIAGNOSIS — Z95.2 S/P MITRAL VALVE REPLACEMENT: ICD-10-CM

## 2025-05-19 DIAGNOSIS — I10 ESSENTIAL HYPERTENSION, BENIGN: Primary | ICD-10-CM

## 2025-05-19 LAB — INR BLD: 3.7 (ref 0.9–1.1)

## 2025-05-19 PROCEDURE — 85610 PROTHROMBIN TIME: CPT

## 2025-05-19 PROCEDURE — 36416 COLLJ CAPILLARY BLOOD SPEC: CPT

## 2025-05-19 NOTE — PROGRESS NOTES
ANTICOAGULATION MANAGEMENT     Fausto Farr 85 year old male is on warfarin with supratherapeutic INR result. (Goal INR 2.5-3.5)    Recent labs: (last 7 days)     05/19/25  0916   INR 3.7*       ASSESSMENT     Source(s): Chart Review and Patient/Caregiver Call     Warfarin doses taken: Warfarin taken as instructed - confirmed dosing decrease advised last visit  Diet: Has been on vacation the last week in [place], [place]. States he had less greens and more alcohol than usual.    Medication/supplement changes: None noted  New illness, injury, or hospitalization: No  Signs or symptoms of bleeding or clotting: No  Previous result: Supratherapeutic  Additional findings: None       PLAN     Recommended plan for temporary change(s) affecting INR     Dosing Instructions: extra serving of greens today + Continue your current warfarin dose with next INR in 2 weeks       Summary  As of 5/19/2025      Full warfarin instructions:  7.5 mg every Mon, Fri; 5 mg all other days   Next INR check:  6/2/2025               Telephone call with Ralph who verbalizes understanding and agrees to plan    Lab visit scheduled    Education provided: Please call back if any changes to your diet, medications or how you've been taking warfarin  Dietary considerations: impact of vitamin K foods on INR  Healthy lifestyle considerations: potential interaction between warfarin and alcohol    Plan made per [facility] anticoagulation protocol    Maria Luz Rios RN  5/19/2025  Anticoagulation Clinic  [employer] for routing messages: joel DOWELL  [facility] patient phone line: [phone]        _______________________________________________________________________     Anticoagulation Episode Summary       Current INR goal:  2.5-3.5   TTR:  45.0% (1 y)   Target end date:  Indefinite   Send INR reminders to:  SHANNA DOWELL    Indications    S/P mitral valve replacement [Z95.2]  Long term current use of anticoagulant therapy [Z79.01]  Longstanding  persistent atrial fibrillation (H) [I48.11]             Comments:  Per 9/20/22 Cardio Note strict INR goal of 2.5-3.5. If INR falls below 2.5 at any time, needs to be bridged with Lovenox. consent to leave DVM              Anticoagulation Care Providers       Provider Role Specialty Phone number    Jodi Flower Mai, MD Referring Internal Medicine - Pediatrics [phone]    Kerwin Ramos MD Referring Internal Medicine [phone]

## 2025-05-20 ENCOUNTER — ANCILLARY PROCEDURE (OUTPATIENT)
Dept: CARDIOLOGY | Facility: CLINIC | Age: 85
End: 2025-05-20
Attending: INTERNAL MEDICINE
Payer: MEDICARE

## 2025-05-20 DIAGNOSIS — Z95.0 CARDIAC PACEMAKER IN SITU: ICD-10-CM

## 2025-05-20 DIAGNOSIS — I44.2 COMPLETE ATRIOVENTRICULAR BLOCK (H): ICD-10-CM

## 2025-05-20 LAB
MDC_IDC_EPISODE_DTM: NORMAL
MDC_IDC_EPISODE_DURATION: 13 S
MDC_IDC_EPISODE_DURATION: 13 S
MDC_IDC_EPISODE_DURATION: 14 S
MDC_IDC_EPISODE_DURATION: 15 S
MDC_IDC_EPISODE_DURATION: 16 S
MDC_IDC_EPISODE_DURATION: 17 S
MDC_IDC_EPISODE_DURATION: 21 S
MDC_IDC_EPISODE_ID: NORMAL
MDC_IDC_EPISODE_TYPE: NORMAL
MDC_IDC_EPISODE_TYPE_INDUCED: NO
MDC_IDC_LEAD_CONNECTION_STATUS: NORMAL
MDC_IDC_LEAD_CONNECTION_STATUS: NORMAL
MDC_IDC_LEAD_IMPLANT_DT: NORMAL
MDC_IDC_LEAD_IMPLANT_DT: NORMAL
MDC_IDC_LEAD_LOCATION: NORMAL
MDC_IDC_LEAD_LOCATION: NORMAL
MDC_IDC_LEAD_LOCATION_DETAIL_1: NORMAL
MDC_IDC_LEAD_LOCATION_DETAIL_1: NORMAL
MDC_IDC_LEAD_MFG: NORMAL
MDC_IDC_LEAD_MFG: NORMAL
MDC_IDC_LEAD_MODEL: NORMAL
MDC_IDC_LEAD_MODEL: NORMAL
MDC_IDC_LEAD_POLARITY_TYPE: NORMAL
MDC_IDC_LEAD_POLARITY_TYPE: NORMAL
MDC_IDC_LEAD_SERIAL: NORMAL
MDC_IDC_LEAD_SERIAL: NORMAL
MDC_IDC_MSMT_BATTERY_DTM: NORMAL
MDC_IDC_MSMT_BATTERY_REMAINING_LONGEVITY: 102 MO
MDC_IDC_MSMT_BATTERY_REMAINING_PERCENTAGE: 100 %
MDC_IDC_MSMT_BATTERY_STATUS: NORMAL
MDC_IDC_MSMT_LEADCHNL_RA_IMPEDANCE_VALUE: 609 OHM
MDC_IDC_MSMT_LEADCHNL_RV_IMPEDANCE_VALUE: 556 OHM
MDC_IDC_MSMT_LEADCHNL_RV_PACING_THRESHOLD_AMPLITUDE: 0.9 V
MDC_IDC_MSMT_LEADCHNL_RV_PACING_THRESHOLD_PULSEWIDTH: 0.4 MS
MDC_IDC_PG_IMPLANT_DTM: NORMAL
MDC_IDC_PG_MFG: NORMAL
MDC_IDC_PG_MODEL: NORMAL
MDC_IDC_PG_SERIAL: NORMAL
MDC_IDC_PG_TYPE: NORMAL
MDC_IDC_SESS_CLINIC_NAME: NORMAL
MDC_IDC_SESS_DTM: NORMAL
MDC_IDC_SESS_TYPE: NORMAL
MDC_IDC_SET_BRADY_AT_MODE_SWITCH_MODE: NORMAL
MDC_IDC_SET_BRADY_AT_MODE_SWITCH_RATE: 170 {BEATS}/MIN
MDC_IDC_SET_BRADY_LOWRATE: 60 {BEATS}/MIN
MDC_IDC_SET_BRADY_MAX_SENSOR_RATE: 130 {BEATS}/MIN
MDC_IDC_SET_BRADY_MAX_TRACKING_RATE: 130 {BEATS}/MIN
MDC_IDC_SET_BRADY_MODE: NORMAL
MDC_IDC_SET_BRADY_PAV_DELAY_HIGH: 200 MS
MDC_IDC_SET_BRADY_PAV_DELAY_LOW: 250 MS
MDC_IDC_SET_BRADY_SAV_DELAY_HIGH: 200 MS
MDC_IDC_SET_BRADY_SAV_DELAY_LOW: 250 MS
MDC_IDC_SET_LEADCHNL_RA_PACING_AMPLITUDE: 5 V
MDC_IDC_SET_LEADCHNL_RA_PACING_CAPTURE_MODE: NORMAL
MDC_IDC_SET_LEADCHNL_RA_PACING_POLARITY: NORMAL
MDC_IDC_SET_LEADCHNL_RA_PACING_PULSEWIDTH: 0.4 MS
MDC_IDC_SET_LEADCHNL_RA_SENSING_ADAPTATION_MODE: NORMAL
MDC_IDC_SET_LEADCHNL_RA_SENSING_POLARITY: NORMAL
MDC_IDC_SET_LEADCHNL_RA_SENSING_SENSITIVITY: 0.25 MV
MDC_IDC_SET_LEADCHNL_RV_PACING_AMPLITUDE: 1.4 V
MDC_IDC_SET_LEADCHNL_RV_PACING_CAPTURE_MODE: NORMAL
MDC_IDC_SET_LEADCHNL_RV_PACING_POLARITY: NORMAL
MDC_IDC_SET_LEADCHNL_RV_PACING_PULSEWIDTH: 0.4 MS
MDC_IDC_SET_LEADCHNL_RV_SENSING_ADAPTATION_MODE: NORMAL
MDC_IDC_SET_LEADCHNL_RV_SENSING_POLARITY: NORMAL
MDC_IDC_SET_LEADCHNL_RV_SENSING_SENSITIVITY: 0.6 MV
MDC_IDC_SET_ZONE_DETECTION_INTERVAL: 375 MS
MDC_IDC_SET_ZONE_STATUS: NORMAL
MDC_IDC_SET_ZONE_TYPE: NORMAL
MDC_IDC_SET_ZONE_VENDOR_TYPE: NORMAL
MDC_IDC_STAT_AT_BURDEN_PERCENT: 100 %
MDC_IDC_STAT_AT_DTM_END: NORMAL
MDC_IDC_STAT_AT_DTM_START: NORMAL
MDC_IDC_STAT_BRADY_DTM_END: NORMAL
MDC_IDC_STAT_BRADY_DTM_START: NORMAL
MDC_IDC_STAT_BRADY_RA_PERCENT_PACED: 0 %
MDC_IDC_STAT_BRADY_RV_PERCENT_PACED: 91 %
MDC_IDC_STAT_EPISODE_RECENT_COUNT: 0
MDC_IDC_STAT_EPISODE_RECENT_COUNT: 0
MDC_IDC_STAT_EPISODE_RECENT_COUNT: 13
MDC_IDC_STAT_EPISODE_RECENT_COUNT_DTM_END: NORMAL
MDC_IDC_STAT_EPISODE_RECENT_COUNT_DTM_START: NORMAL
MDC_IDC_STAT_EPISODE_TYPE: NORMAL
MDC_IDC_STAT_EPISODE_VENDOR_TYPE: NORMAL
MDC_IDC_STAT_EPISODE_VENDOR_TYPE: NORMAL

## 2025-05-20 PROCEDURE — 93294 REM INTERROG EVL PM/LDLS PM: CPT | Performed by: INTERNAL MEDICINE

## 2025-05-20 PROCEDURE — 93296 REM INTERROG EVL PM/IDS: CPT | Performed by: INTERNAL MEDICINE

## 2025-06-02 ENCOUNTER — ANTICOAGULATION THERAPY VISIT (OUTPATIENT)
Dept: ANTICOAGULATION | Facility: CLINIC | Age: 85
End: 2025-06-02

## 2025-06-02 ENCOUNTER — RESULTS FOLLOW-UP (OUTPATIENT)
Dept: ANTICOAGULATION | Facility: CLINIC | Age: 85
End: 2025-06-02

## 2025-06-02 ENCOUNTER — LAB (OUTPATIENT)
Dept: LAB | Facility: CLINIC | Age: 85
End: 2025-06-02
Payer: MEDICARE

## 2025-06-02 DIAGNOSIS — I48.11 LONGSTANDING PERSISTENT ATRIAL FIBRILLATION (H): ICD-10-CM

## 2025-06-02 DIAGNOSIS — Z79.01 LONG TERM CURRENT USE OF ANTICOAGULANT THERAPY: ICD-10-CM

## 2025-06-02 DIAGNOSIS — Z95.2 S/P MITRAL VALVE REPLACEMENT: ICD-10-CM

## 2025-06-02 DIAGNOSIS — Z95.2 S/P MITRAL VALVE REPLACEMENT: Primary | ICD-10-CM

## 2025-06-02 LAB — INR BLD: 2.8 (ref 0.9–1.1)

## 2025-06-02 PROCEDURE — 36416 COLLJ CAPILLARY BLOOD SPEC: CPT

## 2025-06-02 PROCEDURE — 85610 PROTHROMBIN TIME: CPT

## 2025-06-02 NOTE — PROGRESS NOTES
ANTICOAGULATION MANAGEMENT     Fausto Farr 85 year old male is on warfarin with therapeutic INR result. (Goal INR 2.5-3.5)    Recent labs: (last 7 days)     06/02/25  0855   INR 2.8*       ASSESSMENT     Source(s): Chart Review and Patient/Caregiver Call     Warfarin doses taken: Warfarin taken as instructed  Diet: No new diet changes identified  Medication/supplement changes: None noted  New illness, injury, or hospitalization: No  Signs or symptoms of bleeding or clotting: Yes, patient reports he gets small, red spots on his arms (smaller than a dime), this is not new and not concerning  Previous result: Supratherapeutic  Additional findings: None       PLAN     Recommended plan for no diet, medication or health factor changes affecting INR     Dosing Instructions: Continue your current warfarin dose with next INR in 3 weeks       Summary  As of 6/2/2025      Full warfarin instructions:  7.5 mg every Mon, Fri; 5 mg all other days   Next INR check:  6/24/2025               Telephone call with Ralph who verbalizes understanding and agrees to plan    Lab visit scheduled    Education provided: Taking warfarin: Importance of taking warfarin as instructed    Plan made per [facility] anticoagulation protocol    Aure Sampson, RN  6/2/2025  Anticoagulation Clinic  [employer] for routing messages: joel DOWELL  [facility] patient phone line: [phone]        _______________________________________________________________________     Anticoagulation Episode Summary       Current INR goal:  2.5-3.5   TTR:  44.4% (1 y)   Target end date:  Indefinite   Send INR reminders to:  SHANNA DOWELL    Indications    S/P mitral valve replacement [Z95.2]  Long term current use of anticoagulant therapy [Z79.01]  Longstanding persistent atrial fibrillation (H) [I48.11]             Comments:  Per 9/20/22 Cardio Note strict INR goal of 2.5-3.5. If INR falls below 2.5 at any time, needs to be bridged with Lovenox. consent to leave DVM               Anticoagulation Care Providers       Provider Role Specialty Phone number    Jodi Flower Mai, MD Referring Internal Medicine - Pediatrics [phone]    Kerwin Ramos MD Referring Internal Medicine [phone]

## 2025-06-24 ENCOUNTER — RESULTS FOLLOW-UP (OUTPATIENT)
Dept: ANTICOAGULATION | Facility: CLINIC | Age: 85
End: 2025-06-24

## 2025-06-24 ENCOUNTER — LAB (OUTPATIENT)
Dept: LAB | Facility: CLINIC | Age: 85
End: 2025-06-24
Payer: MEDICARE

## 2025-06-24 ENCOUNTER — ANTICOAGULATION THERAPY VISIT (OUTPATIENT)
Dept: ANTICOAGULATION | Facility: CLINIC | Age: 85
End: 2025-06-24

## 2025-06-24 DIAGNOSIS — I48.11 LONGSTANDING PERSISTENT ATRIAL FIBRILLATION (H): ICD-10-CM

## 2025-06-24 DIAGNOSIS — Z95.2 S/P MITRAL VALVE REPLACEMENT: Primary | ICD-10-CM

## 2025-06-24 DIAGNOSIS — Z95.2 S/P MITRAL VALVE REPLACEMENT: ICD-10-CM

## 2025-06-24 DIAGNOSIS — Z79.01 LONG TERM CURRENT USE OF ANTICOAGULANT THERAPY: ICD-10-CM

## 2025-06-24 LAB — INR BLD: 3.3 (ref 0.9–1.1)

## 2025-06-24 PROCEDURE — 85610 PROTHROMBIN TIME: CPT

## 2025-06-24 PROCEDURE — 36416 COLLJ CAPILLARY BLOOD SPEC: CPT

## 2025-06-24 NOTE — PROGRESS NOTES
ANTICOAGULATION MANAGEMENT     Fausto Farr 85 year old male is on warfarin with therapeutic INR result. (Goal INR 2.5-3.5)    Recent labs: (last 7 days)     06/24/25  0858   INR 3.3*       ASSESSMENT     Source(s): Chart Review and Patient/Caregiver Call     Warfarin doses taken: Warfarin taken as instructed  Diet: No new diet changes identified  Medication/supplement changes: None noted  New illness, injury, or hospitalization: No  Signs or symptoms of bleeding or clotting: No  Previous result: Therapeutic last visit; previously outside of goal range  Additional findings: None       PLAN     Recommended plan for no diet, medication or health factor changes affecting INR     Dosing Instructions: Continue your current warfarin dose with next INR in 4 weeks       Summary  As of 6/24/2025      Full warfarin instructions:  7.5 mg every Mon, Fri; 5 mg all other days   Next INR check:  7/22/2025               Telephone call with Ralph who agrees to plan and repeated back plan correctly    Lab visit scheduled    Education provided: Please call back if any changes to your diet, medications or how you've been taking warfarin    Plan made per [facility] anticoagulation protocol    Ruthann Sanders RN  6/24/2025  Anticoagulation Clinic  [employer] for routing messages: joel DOWELL  [facility] patient phone line: [phone]        _______________________________________________________________________     Anticoagulation Episode Summary       Current INR goal:  2.5-3.5   TTR:  44.4% (1 y)   Target end date:  Indefinite   Send INR reminders to:  SHANNA DOWELL    Indications    S/P mitral valve replacement [Z95.2]  Long term current use of anticoagulant therapy [Z79.01]  Longstanding persistent atrial fibrillation (H) [I48.11]             Comments:  Per 9/20/22 Cardio Note strict INR goal of 2.5-3.5. If INR falls below 2.5 at any time, needs to be bridged with Lovenox. consent to leave DVM              Anticoagulation Care Providers        Provider Role Specialty Phone number    Jodi Flower Mai, MD Referring Internal Medicine - Pediatrics [phone]    Kerwin Ramos MD Referring Internal Medicine [phone]

## 2025-07-02 ENCOUNTER — LAB (OUTPATIENT)
Dept: LAB | Facility: CLINIC | Age: 85
End: 2025-07-02
Payer: MEDICARE

## 2025-07-02 DIAGNOSIS — I50.20 SYSTOLIC CONGESTIVE HEART FAILURE, UNSPECIFIED HF CHRONICITY (H): ICD-10-CM

## 2025-07-02 DIAGNOSIS — I25.10 CORONARY ARTERY DISEASE WITHOUT ANGINA PECTORIS, UNSPECIFIED VESSEL OR LESION TYPE, UNSPECIFIED WHETHER NATIVE OR TRANSPLANTED HEART: ICD-10-CM

## 2025-07-02 DIAGNOSIS — Z12.5 SCREENING FOR PROSTATE CANCER: Primary | ICD-10-CM

## 2025-07-02 DIAGNOSIS — E11.9 DIABETES MELLITUS, TYPE 2 (H): ICD-10-CM

## 2025-07-02 DIAGNOSIS — I10 ESSENTIAL HYPERTENSION, BENIGN: ICD-10-CM

## 2025-07-02 DIAGNOSIS — E78.2 MIXED HYPERLIPIDEMIA: ICD-10-CM

## 2025-07-02 LAB
ALT SERPL W P-5'-P-CCNC: 27 U/L (ref 0–70)
ANION GAP SERPL CALCULATED.3IONS-SCNC: 11 MMOL/L (ref 7–15)
BUN SERPL-MCNC: 14 MG/DL (ref 8–23)
CALCIUM SERPL-MCNC: 9.6 MG/DL (ref 8.8–10.4)
CHLORIDE SERPL-SCNC: 103 MMOL/L (ref 98–107)
CHOLEST SERPL-MCNC: 133 MG/DL
CREAT SERPL-MCNC: 0.89 MG/DL (ref 0.67–1.17)
EGFRCR SERPLBLD CKD-EPI 2021: 85 ML/MIN/1.73M2
EST. AVERAGE GLUCOSE BLD GHB EST-MCNC: 137 MG/DL
FASTING STATUS PATIENT QL REPORTED: YES
FASTING STATUS PATIENT QL REPORTED: YES
GLUCOSE SERPL-MCNC: 147 MG/DL (ref 70–99)
HBA1C MFR BLD: 6.4 % (ref 0–5.6)
HCO3 SERPL-SCNC: 26 MMOL/L (ref 22–29)
HDLC SERPL-MCNC: 37 MG/DL
LDLC SERPL CALC-MCNC: 73 MG/DL
NONHDLC SERPL-MCNC: 96 MG/DL
POTASSIUM SERPL-SCNC: 4.6 MMOL/L (ref 3.4–5.3)
PSA SERPL DL<=0.01 NG/ML-MCNC: <0.01 NG/ML
SODIUM SERPL-SCNC: 140 MMOL/L (ref 135–145)
TRIGL SERPL-MCNC: 114 MG/DL

## 2025-07-02 PROCEDURE — G0103 PSA SCREENING: HCPCS

## 2025-07-02 PROCEDURE — 83036 HEMOGLOBIN GLYCOSYLATED A1C: CPT

## 2025-07-02 PROCEDURE — 80048 BASIC METABOLIC PNL TOTAL CA: CPT

## 2025-07-02 PROCEDURE — 85027 COMPLETE CBC AUTOMATED: CPT

## 2025-07-02 PROCEDURE — 84460 ALANINE AMINO (ALT) (SGPT): CPT

## 2025-07-02 PROCEDURE — 80061 LIPID PANEL: CPT

## 2025-07-02 PROCEDURE — 36415 COLL VENOUS BLD VENIPUNCTURE: CPT

## 2025-07-03 ENCOUNTER — RESULTS FOLLOW-UP (OUTPATIENT)
Dept: CARDIOLOGY | Facility: CLINIC | Age: 85
End: 2025-07-03

## 2025-07-03 ENCOUNTER — RESULTS FOLLOW-UP (OUTPATIENT)
Dept: PEDIATRICS | Facility: CLINIC | Age: 85
End: 2025-07-03

## 2025-07-03 ENCOUNTER — OFFICE VISIT (OUTPATIENT)
Dept: CARDIOLOGY | Facility: CLINIC | Age: 85
End: 2025-07-03
Payer: MEDICARE

## 2025-07-03 VITALS
DIASTOLIC BLOOD PRESSURE: 72 MMHG | SYSTOLIC BLOOD PRESSURE: 134 MMHG | WEIGHT: 200 LBS | HEART RATE: 70 BPM | HEIGHT: 65 IN | BODY MASS INDEX: 33.32 KG/M2

## 2025-07-03 DIAGNOSIS — I50.22 CHRONIC SYSTOLIC CONGESTIVE HEART FAILURE (H): ICD-10-CM

## 2025-07-03 DIAGNOSIS — Z95.2 S/P AORTIC VALVE REPLACEMENT: ICD-10-CM

## 2025-07-03 DIAGNOSIS — I48.91 ATRIAL FIBRILLATION STATUS POST CARDIOVERSION (H): Primary | ICD-10-CM

## 2025-07-03 DIAGNOSIS — Z95.2 S/P MITRAL VALVE REPLACEMENT: ICD-10-CM

## 2025-07-03 DIAGNOSIS — I50.20 SYSTOLIC CONGESTIVE HEART FAILURE, UNSPECIFIED HF CHRONICITY (H): ICD-10-CM

## 2025-07-03 LAB
ERYTHROCYTE [DISTWIDTH] IN BLOOD BY AUTOMATED COUNT: 12.6 % (ref 10–15)
HCT VFR BLD AUTO: 42.2 % (ref 40–53)
HGB BLD-MCNC: 13.5 G/DL (ref 13.3–17.7)
MCH RBC QN AUTO: 30.5 PG (ref 26.5–33)
MCHC RBC AUTO-ENTMCNC: 32 G/DL (ref 31.5–36.5)
MCV RBC AUTO: 95 FL (ref 78–100)
PLATELET # BLD AUTO: 302 10E3/UL (ref 150–450)
RBC # BLD AUTO: 4.43 10E6/UL (ref 4.4–5.9)
WBC # BLD AUTO: 8.2 10E3/UL (ref 4–11)

## 2025-07-03 RX ORDER — ASPIRIN 81 MG/1
81 TABLET ORAL DAILY
COMMUNITY
Start: 2025-11-01

## 2025-07-03 RX ORDER — CLOPIDOGREL BISULFATE 75 MG/1
75 TABLET ORAL DAILY
COMMUNITY
Start: 2025-07-03 | End: 2025-10-31

## 2025-07-03 NOTE — PATIENT INSTRUCTIONS
July 3, 2025    Thank you for allowing our Cardiology team to participate in your care.     Please note the following changes to your heart treatment plan:     Medication changes:   - stop clopidogrel 75 mg daily 10/31/2025  - start aspirin 81 mg daily 11/1/2025    Tests to be done:  - labs in 1 year  - TTE (heart ultrasound) in 1 year    Follow up:  - Follow up in 1 year with cardiology [facility], or sooner as needed      For scheduling, please call [phone]      Please contact our team through [employer] or our Nurse Team Voicemail service [phone] for questions or concerns.     General Clinic [phone]     If you are having a medical emergency, please call 911.     Sincerely,    Dennis Huerta MD, [facility]  Cardiology    [facility] - [facility] and [facility] - [facility] - [place]

## 2025-07-03 NOTE — PROGRESS NOTES
Cardiology Clinic Progress Note:    July 3, 2025   Patient Name: Fausto Farr  Patient MRN: [id number]     Consult indication: CAD, CHF    HPI:    I had the opportunity to see patient Fausto Farr in cardiology clinic for a follow up visit. Patient is followed by our colleague Chris Flower MD with Primary Care.     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    Patient previously followed by my partner Dr. Santo who has since retired.  I am meeting him and his wife for the first time today.  Reviewed prior cardiac history.  Most recently patient was hospitalized for NSTEMI 11/2024, underwent coronary angiography and PCI to the proximal to mid LAD with 2 drug-eluting stents, LIMA graft was atretic whereas the radial graft was patent.    Since then patient reports that overall he feels well.  Denies any chest pain, chest pressure, abnormal shortness of breath.  Blood pressure 134/72 mmHg in clinic today.  Personally reviewed labs from 7/2/2025, normal electrolytes and renal function, LDL 73.      [employer] L331 Accolade (D) Remote PPM Device Check     Mode: DDD 60/130  Presenting Rhythm: AFib with   Last In-Clinic Underlying Rhythm/Date: AF, V rates 40's-70's as of 11/6/24     Pacing/Histogram Data since: 11/6/24  AP: 0%  : 91%  Heart Rate: adequate rates per histograms     Sensing: stable  Pacing Threshold: stable  Impedance: stable  Battery Status: 8.5 years     Arrhythmia Data Since: 2/11/25  Atrial Arrhythmia: Pt in  mode switch 100% of the time since 6/2024. Ventricular rates controlled. Taking warfarin and metoprolol. Dr Santo aware.  Ventricular Arrhythmia: 7 ventricular high rates. 2 EGMs for review show AFib with VS events suggestive of NSVT lasting 16-20 beats, rates 135-270bpm. EF 45-50% (11/2024).      Care Plan: F/u remote ppm check in 3 months. Pt scheduled 7/3 with Dr Huerta. Results sent via [employer]. Nida, Device Specialist     I have reviewed and interpreted the device interrogation, settings, programming and nurse's summary. The device is functioning within normal device parameters, unless otherwise noted above. I agree with the current findings, assessment, and plan.    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, euvolemic on exam, weight 200 lb  # History of redo AVR with 27 mm St Solo mechanical AV 2011, history of MVR with 21 mm St Solo mechanical MV 2011  # CAD s/p CABG x 2 LIMA to LAD and radial graft RCA in 2006, NSTEMI 11/2024 status post REYNA to proximal to mid LAD 11/2024  # previous endovascular AAA repair  # complete AV block status post PPM 2022, short run of NSVT noted on last device interrogation, asymptomatic and already on metoprolol  # GAGE on CPAP,   # atrial flutter status post ablation 2019, atrial fibrillation, rates controlled on device interrogation  # varicose veins and venous insufficiency status post closure 2019    - Overall patient is stable from a cardiac perspective without symptoms concerning for angina or decompensated heart failure  - Reviewed prior cardiac history in detail, given advanced age and significant comorbidities, it seems patient and wife would favor a more conservative management approach with minimal medication adjustments unless absolutely necessary which I think is appropriate   - Recommend finishing 12-month course of clopidogrel post PCI, then transitioning to aspirin 81 mg daily (11/2025)  - Continue warfarin, discussed risks/benefits of  aspirin and warfarin versus warfarin alone, patient denies issues with abnormal bleeding/bruising, given significant history of CAD, would favor combination therapy, patient was in agreement, understands importance of monitoring for signs/symptoms of abnormal bleeding  - Endocarditis prophylaxis  - Continue metoprolol succinate, rosuvastatin, ezetimibe  - Follow-up with cardiology [facility] in 1 year with labs and TTE, or sooner as needed    Thank you for allowing our team to participate in the care of Fausto Farr.  Please do not hesitate to call or page me with any questions or concerns.    Sincerely,     Dennis Huerta MD, [facility]  Cardiology  July 3, 2025      Voice recognition software utilized.     Total time spent on this encounter today: Greater than 40 minutes, providing care in this encounter including, but not limited to, reviewing prior medical records, laboratory data, imaging studies, diagnostic studies, procedure notes, formulating an assessment and plan, recommendations, discussion and counseling with patient face to face, dictation.    The longitudinal plan of care for the diagnosis(es)/condition(s) as documented were addressed during this visit. Due to the added complexity in care, I will continue to support Ralph in the subsequent management and with ongoing continuity of care.     Past Medical History:     Past Medical History:   Diagnosis Date    A-fib and flutter permanent per dr jeffrey EP  pacer in place     Abdominal pain     Anemia     currently taking iron    Back pain     since 1980    BPH (benign prostatic hyperplasia)     Bruit     CAD (coronary artery disease)     Cellulitis 10/18/2012    Cellulitis 05/2018    GrpB strep LLE cellulitis  negative RACHAEL for veg    Chronic venous insufficiency     bilat lower extremities    Contact dermatitis and other eczema, due to unspecified cause     Diaphragmatic hernia without mention of obstruction or gangrene     Diastolic HF (heart  failure) (H)     Gastric ulcer     Hypertension 08/06/2013    Lumbago     Mesenteric artery insufficiency     known AAA and narrowing of intestinal artery's  followed by dr raymond    Metabolic syndrome     Mitral valve disease     s/p mechanical MVR, prior [facility] AVR    Nonallopathic lesion of lumbar region     Nonallopathic lesion of rib cage     Nonallopathic lesion of sacral region     GAGE (obstructive sleep apnea)     CPAP    Paroxysmal atrial fibrillation (H) 10/18/2012    occured after stopping BB  (prior  aflutter ablation)    Prostate cancer (H) 2008    radiation seed, XRT     PVD (peripheral vascular disease)     Rotator cuff strain     and sprain    S/P AAA repair     S/P aortic valve replacement 2006    developed perivalve leak and MS, therefore redo surg 2013    S/P CABG (coronary artery bypass graft) 2006    Lima-Lad, RA-Rca    Sciatica of left side     since 2000    Sepsis due to group B Streptococcus (H) 05/19/2018    TIA (transient ischemic attack) 08/01/2022    Total knee replacement status 07/22/2019    Ulcerative colitis (H)     Varicose veins of bilateral lower extremities with other complications     s/p RLE vein stripping    Vitamin D deficiency         Past Surgical History:   Past Surgical History:   Procedure Laterality Date    AORTIC VALVE REPLACEMENT  1/3/06    redo AVR SJM 21mm and SJM MVR 27mm in 2013SJM 21([id number]):AVR, SJM 27 :MVR-    APPLY WOUND VAC N/A 11/12/2019    Procedure: APPLICATION, WOUND VAC;  Surgeon: Sara Martinez MD;  Location: [facility] OR    ARTHROPLASTY KNEE      right knee    ARTHROPLASTY KNEE Right 7/22/2019    Procedure: Right total knee arthroplasty;  Surgeon: Prasanth Jensen MD;  Location: [facility] OR    BACK SURGERY  Oct 2015    Fusion L4-5, laminectomy L2, L3    BYPASS GRAFT ARTERY CORONARY  10/2013    reimplantation of radial artery graft to RCA    CARDIAC CATHERIZATION  11/2005    Stent placed to RCA    CARDIAC CATHERIZATION  04/2013    Occluded RCA,  patent LIMA to LAD and radial graft to PDA    CARPAL TUNNEL RELEASE RT/LT  1994    COLONOSCOPY  8-22-11    CV CORONARY ANGIOGRAM N/A 11/14/2024    Procedure: Coronary Angiogram;  Surgeon: Ebenezer Wyatt MD;  Location:  HEART CARDIAC CATH LAB    CV PCI N/A 11/14/2024    Procedure: Percutaneous Coronary Intervention;  Surgeon: Ebenezer Wyatt MD;  Location:  HEART CARDIAC CATH LAB    CYSTOSCOPY FLEXIBLE  10/16/2013    Procedure: CYSTOSCOPY FLEXIBLE;  FLEXIBLE CYSTOSCOPY / DILATION OF URETHRA / INSERTION OF LESLIE;  Surgeon: Cooper Wallace MD;  Location:  OR    ENDOVASCULAR REPAIR, INFRARENAL ABDOMINAL AORTIC ANEURYSM/DISSECTION; MODULAR BIFURCATED PROSTHESIS  2006    AAA repair endovascular    ENT SURGERY      EP ABLATION ATRIAL FLUTTER N/A 4/22/2019    Procedure: EP Ablation Atrial Flutter;  Surgeon: Jessy Vasquez MD;  Location:  HEART CARDIAC CATH LAB    EP PACEMAKER DEVICE & LEAD IMPLANT- RIGHT ATRIAL & RIGHT VENTRICULAR N/A 8/2/2022    Procedure: Pacemaker Device & Lead Implant - Right Atrial & Right Ventricular;  Surgeon: Jessy Vasquez MD;  Location:  HEART CARDIAC CATH LAB    GENITOURINARY SURGERY  6/16/08    radioactive seeding    GRAFT FLAP PEDICLE EXTREMITY (LOCATION) Right 11/12/2019    Procedure: SURGICAL PROCUREMENT, FLAP, PEDICLE, EXTREMITY;  Surgeon: Sara Martinez MD;  Location:  OR    GRAFT SKIN SPLIT THICKNESS FROM EXTREMITY Right 11/12/2019    Procedure: RIGHT GASTRONEMIUS FLAP TO RIGHT KNEE, SPLIT THICKNESS SKIN GRAFT FROM RIGHT THIGH TO RIGHT KNEE, SURGICAL PROCUREMENT, FLAP, PEDICLE, EXTREMITY, WOUND VAC PLACEMENT;  Surgeon: Sara Martinez MD;  Location:  OR    HEAD & NECK SURGERY  1997    vocal cord polypectomy    INCISION AND DRAINAGE KNEE, COMBINED Right 8/29/2019    Procedure: INCISION AND DRAINAGE, KNEE W/ Secondary Wound Closure;  Surgeon: Prasanth Jensen MD;  Location:  OR    IRRIGATION AND DEBRIDEMENT KNEE, PLACE  ANTIBIOTIC CEMENT BEADS / SPACE Right 2/12/2020    Procedure: 1. Irrigation and debridement of wound breakdown, right total knee arthroplasty.  2. Irrigation and debridement of right total knee with placement of antibiotic-impregnated (vancomycin) absorbable antibiotic beads.;  Surgeon: Prasanth Jensen MD;  Location: [facility] OR    IRRIGATION AND DEBRIDEMENT LOWER EXTREMITY, COMBINED Right 12/8/2019    Procedure: DEBRIDEMENT OF RIGHT CALF AND WOUND CLOSURE.;  Surgeon: Sara Martinez MD;  Location:  OR    IRRIGATION AND DEBRIDEMENT UPPER EXTREMITY, COMBINED Right 1/7/2023    Procedure: Right elbow arthrotomy, irrigation and debridement;  Surgeon: Jose A Christine MD;  Location:  OR    KNEE SURGERY  2001 Right knee arthroscopy    OPTICAL TRACKING SYSTEM FUSION SPINE POSTERIOR LUMBAR THREE+ LEVELS N/A 10/29/2015    Procedure: OPTICAL TRACKING SYSTEM FUSION SPINE POSTERIOR LUMBAR THREE+ LEVELS;  Surgeon: Walt Garcia MD;  Location:  OR    PROSTATE SURGERY      radioactive seeding 6/16/08    REPAIR ANEURYSM ABDOMINAL AORTA  06/08    REPAIR VALVE MITRAL  10/16/2013    SJM 21([id number]):AVR, SJM 27 :MVRProcedure: REPAIR VALVE MITRAL;  REDO STERNOTOMY/REDO AORTIC VALVE REPLACEMENT/ MITRAL VALVE REPLACEMENT/REIMPLANTATION OF RIGHT CORONARY ARTERY BYPASS WITH RACHAEL ( ON PUMP);  Surgeon: Viet Singh MD;  Location:  OR    REPLACE VALVE AORTIC  10/16/2013    Procedure: REPLACE VALVE AORTIC;;  Surgeon: Viet Singh MD;  Location:  OR    SURGERY GENERAL IP CONSULT  05/2008 Excision aneurysm abdominal aorta    SURGERY GENERAL IP CONSULT  1997 Vocal cord polypectomy    VASCULAR SURGERY  1968, 1993     varicose vein stripping    [facility] CABG, VEIN, TWO  1/3/06    Left radial to RCA, LIMA to LAD (RA to RCA reimplanted at time of 2013 surg)       Medications (outpatient):  Current Outpatient Medications   Medication Sig Dispense Refill    acetaminophen (TYLENOL) 325 MG tablet Take 650 mg by  mouth 2 times daily.      amoxicillin-clavulanate (AUGMENTIN) 875-125 MG tablet Take 1 tablet by mouth daily. 90 tablet 3    [START ON 11/1/2025] aspirin 81 MG EC tablet Take 1 tablet (81 mg) by mouth daily.      clopidogrel (PLAVIX) 75 MG tablet Take 1 tablet (75 mg) by mouth daily.      enoxaparin ANTICOAGULANT (LOVENOX) 100 MG/ML syringe Inject 0.9 mLs (90 mg) subcutaneously every 12 hours. (Patient taking differently: Inject 90 mg subcutaneously as needed. Takes as needed) 12.6 mL 0    ezetimibe (ZETIA) 10 MG tablet Take 1 tablet (10 mg) by mouth daily. 100 tablet 3    ferrous sulfate (FEROSUL) 325 (65 Fe) MG tablet Take 325 mg by mouth daily (with breakfast)      fluocinonide (LIDEX) 0.05 % external ointment Apply topically 2 times daily. 60 g 11    glucosamine-chondroitin 500-400 MG CAPS per capsule Take 1 capsule by mouth 2 times daily.      mesalamine (ASACOL HD) 800 MG EC tablet Take 1 tablet (800 mg) by mouth 2 times daily. 200 tablet 3    metoprolol succinate ER (TOPROL XL) 50 MG 24 hr tablet One tablet (50mg) daily      nitroGLYcerin (NITROSTAT) 0.4 MG sublingual tablet For chest pain place 1 tablet under the tongue every 5 minutes for 3 doses. If symptoms persist 5 minutes after 1st dose call 911. 30 tablet 0    rosuvastatin (CRESTOR) 40 MG tablet Take 1 tablet (40 mg) by mouth daily. 100 tablet 3    tamsulosin (FLOMAX) 0.4 MG capsule Take 1 capsule (0.4 mg) by mouth daily. 90 capsule 3    warfarin ANTICOAGULANT (COUMADIN) 5 MG tablet Take 1 tablet (5 mg) every Tu & Thur / Take 1 1/2 tablets (7.5mg) all other days OR per INR clinic 120 tablet 1    cholecalciferol 25 MCG (1000 UT) TABS Take 1,000 Units by mouth daily (Patient not taking: Reported on 7/3/2025)      fluticasone (FLONASE) 50 MCG/ACT nasal spray Spray 1 spray into both nostrils daily. (Patient not taking: Reported on 7/3/2025) 16 g 0       Allergies:  Allergies   Allergen Reactions    Bees Anaphylaxis       Social History:   History   Drug  "Use No      History   Smoking Status    Former    Types: Cigarettes   Smokeless Tobacco    Never     Social History    Substance and Sexual Activity      Alcohol use: Yes        Comment: a couple beers per week (socially)       Family History:  Family History   Problem Relation Age of Onset    No Known Problems Mother     Coronary Artery Disease Father         CABG    Heart Disease Father         Pacemaker    No Known Problems Brother     No Known Problems Sister     No Known Problems Son     Other Cancer Daughter     No Known Problems Daughter     Heart Disease Brother     Other - See Comments Grandchild          Objective & Physical Exam:  /72   Pulse 70   Ht 1.651 m (5' 5\")   Wt 90.7 kg (200 lb)   BMI 33.28 kg/m    Wt Readings from Last 2 Encounters:   07/03/25 90.7 kg (200 lb)   12/19/24 90.7 kg (199 lb 14.4 oz)     Body mass index is 33.28 kg/m .   Body surface area is 2.04 meters squared.    Constitutional: appears stated age, in no apparent distress, appears to be well nourished  Pulmonary: clear to auscultation bilaterally, no wheezes  Cardiovascular: JVP normal, regular rate, regular rhythm, normal mechanical valve sounds, no murmurs, no lower extremity edema  Gastrointestinal: no guarding, non-rigid   Neurologic: awake, alert, moves all extremities  Skin: no jaundice, warm on limited exam    Data reviewed:  Lab Results   Component Value Date    WBC 8.9 11/16/2024    WBC 7.6 06/15/2020    RBC 4.30 (L) 11/16/2024    RBC 4.44 06/15/2020    HGB 12.7 (L) 11/16/2024    HGB 13.0 (L) 06/15/2020    HCT 37.4 (L) 11/16/2024    HCT 40.0 06/15/2020    MCV 87 11/16/2024    MCV 90 06/15/2020    MCH 29.5 11/16/2024    MCH 29.3 06/15/2020    MCHC 34.0 11/16/2024    MCHC 32.5 06/15/2020    RDW 13.2 11/16/2024    RDW 13.3 06/15/2020     11/16/2024     06/15/2020     Sodium   Date Value Ref Range Status   07/02/2025 140 135 - 145 mmol/L Final   06/01/2020 136 133 - 144 mmol/L Final     Potassium   Date " Value Ref Range Status   07/02/2025 4.6 3.4 - 5.3 mmol/L Final   08/03/2022 4.0 3.4 - 5.3 mmol/L Final   06/01/2020 4.0 3.4 - 5.3 mmol/L Final     Chloride   Date Value Ref Range Status   07/02/2025 103 98 - 107 mmol/L Final   08/03/2022 107 94 - 109 mmol/L Final   06/01/2020 106 94 - 109 mmol/L Final     Carbon Dioxide   Date Value Ref Range Status   06/01/2020 26 20 - 32 mmol/L Final     Carbon Dioxide (CO2)   Date Value Ref Range Status   07/02/2025 26 22 - 29 mmol/L Final   08/03/2022 27 20 - 32 mmol/L Final     Anion Gap   Date Value Ref Range Status   07/02/2025 11 7 - 15 mmol/L Final   08/03/2022 6 3 - 14 mmol/L Final   06/01/2020 4 3 - 14 mmol/L Final     Glucose   Date Value Ref Range Status   07/02/2025 147 (H) 70 - 99 mg/dL Final   08/03/2022 108 (H) 70 - 99 mg/dL Final   06/01/2020 102 (H) 70 - 99 mg/dL Final     GLUCOSE BY METER POCT   Date Value Ref Range Status   01/08/2023 137 (H) 70 - 99 mg/dL Final     Urea Nitrogen   Date Value Ref Range Status   07/02/2025 14.0 8.0 - 23.0 mg/dL Final   08/03/2022 15 7 - 30 mg/dL Final   06/01/2020 13 7 - 30 mg/dL Final     Creatinine   Date Value Ref Range Status   07/02/2025 0.89 0.67 - 1.17 mg/dL Final   06/01/2020 0.73 0.66 - 1.25 mg/dL Final     GFR Estimate   Date Value Ref Range Status   07/02/2025 85 >60 mL/min/1.73m2 Final     Comment:     eGFR calculated using 2021 CKD-EPI equation.   10/06/2020 72 >60 mL/min/[1.73_m2] Final     GFR, ESTIMATED POCT   Date Value Ref Range Status   10/12/2022 >60 >60 mL/min/1.73m2 Final     Calcium   Date Value Ref Range Status   07/02/2025 9.6 8.8 - 10.4 mg/dL Final   06/01/2020 8.5 8.5 - 10.1 mg/dL Final     Bilirubin Total   Date Value Ref Range Status   12/10/2024 0.3 <=1.2 mg/dL Final   06/01/2020 0.3 0.2 - 1.3 mg/dL Final     Alkaline Phosphatase   Date Value Ref Range Status   12/10/2024 59 40 - 150 U/L Final   06/01/2020 75 40 - 150 U/L Final     ALT   Date Value Ref Range Status   07/02/2025 27 0 - 70 U/L Final    06/01/2020 16 0 - 70 U/L Final     AST   Date Value Ref Range Status   12/10/2024 36 0 - 45 U/L Final   06/01/2020 19 0 - 45 U/L Final     Recent Labs   Lab Test 07/02/25  0840 11/14/24  1739   CHOL 133 124   HDL 37* 30*   LDL 73 76   TRIG 114 92

## 2025-07-03 NOTE — LETTER
7/3/2025    Chris Flower MD  [address] [place] [address]    RE: Fausto Danielsdashawn Farr       Dear Colleague,     I had the pleasure of seeing Fausto Farr in the [facility].      Cardiology Clinic Progress Note:    July 3, 2025   Patient Name: Fausto Farr  Patient MRN: [id number]     Consult indication: CAD, CHF    HPI:    I had the opportunity to see patient Fausto Farr in cardiology clinic for a follow up visit. Patient is followed by our colleague Chris Flower MD with Primary Care.     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    Patient previously followed by my partner Dr. Santo who has since retired.  I am meeting him and his wife for the first time today.  Reviewed prior cardiac history.  Most recently patient was hospitalized for NSTEMI 11/2024, underwent coronary angiography and PCI to the proximal to mid LAD with 2 drug-eluting stents, LIMA graft was atretic whereas the radial graft was patent.    Since then patient reports that overall he feels well.  Denies any chest pain, chest pressure, abnormal shortness of breath.  Blood pressure 134/72 mmHg in clinic today.  Personally reviewed labs from 7/2/2025, normal electrolytes and renal function, LDL 73.      [employer] L331 Accolade (D) Remote PPM Device Check     Mode: DDD 60/130  Presenting Rhythm: AFib with   Last In-Clinic Underlying Rhythm/Date: AF, V rates 40's-70's as of 11/6/24     Pacing/Histogram Data since:  11/6/24  AP: 0%  : 91%  Heart Rate: adequate rates per histograms     Sensing: stable  Pacing Threshold: stable  Impedance: stable  Battery Status: 8.5 years     Arrhythmia Data Since: 2/11/25  Atrial Arrhythmia: Pt in mode switch 100% of the time since 6/2024. Ventricular rates controlled. Taking warfarin and metoprolol. Dr Santo aware.  Ventricular Arrhythmia: 7 ventricular high rates. 2 EGMs for review show AFib with VS events suggestive of NSVT lasting 16-20 beats, rates 135-270bpm. EF 45-50% (11/2024).      Care Plan: F/u remote ppm check in 3 months. Pt scheduled 7/3 with Dr Huerta. Results sent via [employer]. Nida, Device Specialist     I have reviewed and interpreted the device interrogation, settings, programming and nurse's summary. The device is functioning within normal device parameters, unless otherwise noted above. I agree with the current findings, assessment, and plan.    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, euvolemic on exam, weight 200 lb  # History of redo AVR with 27 mm St Solo mechanical AV 2011, history of MVR with 21 mm St Solo mechanical MV 2011  # CAD s/p CABG x 2 LIMA to LAD and radial graft RCA in 2006, NSTEMI 11/2024 status post REYNA to proximal to mid LAD 11/2024  # previous endovascular AAA repair  # complete AV block status post PPM 2022  # GAGE on CPAP,   # atrial flutter status post ablation 2019, atrial fibrillation  # varicose veins and venous insufficiency status post closure 2019    - Overall patient is stable from a cardiac perspective without symptoms concerning for angina or decompensated heart failure  - Reviewed prior cardiac history in detail, given advanced age and significant comorbidities, it seems patient and wife would favor a more conservative management approach with minimal medication adjustments unless absolutely necessary which I think is appropriate   - Recommend finishing 12-month course of clopidogrel post PCI, then  transitioning to aspirin 81 mg daily (11/2025)  - Continue warfarin, discussed risks/benefits of aspirin and warfarin versus warfarin alone, patient denies issues with abnormal bleeding/bruising, given significant history of CAD, would favor combination therapy, patient was in agreement, understands importance of monitoring for signs/symptoms of abnormal bleeding  - Endocarditis prophylaxis  - Continue metoprolol succinate, rosuvastatin, ezetimibe  - Follow-up with cardiology AURELIA in 1 year with labs and TTE, or sooner as needed    Thank you for allowing our team to participate in the care of Fausto Farr.  Please do not hesitate to call or page me with any questions or concerns.    Sincerely,     Dennis Huerta MD, [facility]  Cardiology  July 3, 2025      Voice recognition software utilized.     Total time spent on this encounter today: Greater than 40 minutes, providing care in this encounter including, but not limited to, reviewing prior medical records, laboratory data, imaging studies, diagnostic studies, procedure notes, formulating an assessment and plan, recommendations, discussion and counseling with patient face to face, dictation.    The longitudinal plan of care for the diagnosis(es)/condition(s) as documented were addressed during this visit. Due to the added complexity in care, I will continue to support Ralph in the subsequent management and with ongoing continuity of care.     Past Medical History:     Past Medical History:   Diagnosis Date     A-fib and flutter permanent per dr jeffrey EP  pacer in place      Abdominal pain      Anemia     currently taking iron     Back pain     since 1980     BPH (benign prostatic hyperplasia)      Bruit      CAD (coronary artery disease)      Cellulitis 10/18/2012     Cellulitis 05/2018    GrpB strep LLE cellulitis  negative RACHAEL for veg     Chronic venous insufficiency     bilat lower extremities     Contact dermatitis and other eczema, due to  unspecified cause      Diaphragmatic hernia without mention of obstruction or gangrene      Diastolic HF (heart failure) (H)      Gastric ulcer      Hypertension 08/06/2013     Lumbago      Mesenteric artery insufficiency     known AAA and narrowing of intestinal artery's  followed by dr raymond     Metabolic syndrome      Mitral valve disease     s/p mechanical MVR, prior mech AVR     Nonallopathic lesion of lumbar region      Nonallopathic lesion of rib cage      Nonallopathic lesion of sacral region      GAGE (obstructive sleep apnea)     CPAP     Paroxysmal atrial fibrillation (H) 10/18/2012    occured after stopping BB  (prior  aflutter ablation)     Prostate cancer (H) 2008    radiation seed, XRT      PVD (peripheral vascular disease)      Rotator cuff strain     and sprain     S/P AAA repair      S/P aortic valve replacement 2006    developed perivalve leak and MS, therefore redo surg 2013     S/P CABG (coronary artery bypass graft) 2006    Lima-Lad, RA-Rca     Sciatica of left side     since 2000     Sepsis due to group B Streptococcus (H) 05/19/2018     TIA (transient ischemic attack) 08/01/2022     Total knee replacement status 07/22/2019     Ulcerative colitis (H)      Varicose veins of bilateral lower extremities with other complications     s/p RLE vein stripping     Vitamin D deficiency         Past Surgical History:   Past Surgical History:   Procedure Laterality Date     AORTIC VALVE REPLACEMENT  1/3/06    redo AVR SJM 21mm and SJM MVR 27mm in 2013SJM 21([id number]):AVR, SJM 27 :MVR-     APPLY WOUND VAC N/A 11/12/2019    Procedure: APPLICATION, WOUND VAC;  Surgeon: Sara Martinez MD;  Location: [facility] OR     ARTHROPLASTY KNEE      right knee     ARTHROPLASTY KNEE Right 7/22/2019    Procedure: Right total knee arthroplasty;  Surgeon: Prasanth Jensen MD;  Location: [facility] OR     BACK SURGERY  Oct 2015    Fusion L4-5, laminectomy L2, L3     BYPASS GRAFT ARTERY CORONARY  10/2013     reimplantation of radial artery graft to RCA     CARDIAC CATHERIZATION  11/2005    Stent placed to RCA     CARDIAC CATHERIZATION  04/2013    Occluded RCA, patent LIMA to LAD and radial graft to PDA     CARPAL TUNNEL RELEASE RT/LT  1994     COLONOSCOPY  8-22-11     CV CORONARY ANGIOGRAM N/A 11/14/2024    Procedure: Coronary Angiogram;  Surgeon: Ebenezer Wyatt MD;  Location:  HEART CARDIAC CATH LAB     CV PCI N/A 11/14/2024    Procedure: Percutaneous Coronary Intervention;  Surgeon: Ebenezer Wyatt MD;  Location:  HEART CARDIAC CATH LAB     CYSTOSCOPY FLEXIBLE  10/16/2013    Procedure: CYSTOSCOPY FLEXIBLE;  FLEXIBLE CYSTOSCOPY / DILATION OF URETHRA / INSERTION OF LESLIE;  Surgeon: Cooper Wallace MD;  Location: [facility]     ENDOVASCULAR REPAIR, INFRARENAL ABDOMINAL AORTIC ANEURYSM/DISSECTION; MODULAR BIFURCATED PROSTHESIS  2006    AAA repair endovascular     ENT SURGERY       EP ABLATION ATRIAL FLUTTER N/A 4/22/2019    Procedure: EP Ablation Atrial Flutter;  Surgeon: Jessy Vasquez MD;  Location:  HEART CARDIAC CATH LAB     EP PACEMAKER DEVICE & LEAD IMPLANT- RIGHT ATRIAL & RIGHT VENTRICULAR N/A 8/2/2022    Procedure: Pacemaker Device & Lead Implant - Right Atrial & Right Ventricular;  Surgeon: Jessy Vasquez MD;  Location:  HEART CARDIAC CATH LAB     GENITOURINARY SURGERY  6/16/08    radioactive seeding     GRAFT FLAP PEDICLE EXTREMITY (LOCATION) Right 11/12/2019    Procedure: SURGICAL PROCUREMENT, FLAP, PEDICLE, EXTREMITY;  Surgeon: Sara Martinez MD;  Location:  OR     GRAFT SKIN SPLIT THICKNESS FROM EXTREMITY Right 11/12/2019    Procedure: RIGHT GASTRONEMIUS FLAP TO RIGHT KNEE, SPLIT THICKNESS SKIN GRAFT FROM RIGHT THIGH TO RIGHT KNEE, SURGICAL PROCUREMENT, FLAP, PEDICLE, EXTREMITY, WOUND VAC PLACEMENT;  Surgeon: Sara Martinez MD;  Location:  OR     HEAD & NECK SURGERY  1997    vocal cord polypectomy     INCISION AND DRAINAGE KNEE, COMBINED Right  8/29/2019    Procedure: INCISION AND DRAINAGE, KNEE W/ Secondary Wound Closure;  Surgeon: Prasanth Jensen MD;  Location: [facility] OR     IRRIGATION AND DEBRIDEMENT KNEE, PLACE ANTIBIOTIC CEMENT BEADS / SPACE Right 2/12/2020    Procedure: 1. Irrigation and debridement of wound breakdown, right total knee arthroplasty.  2. Irrigation and debridement of right total knee with placement of antibiotic-impregnated (vancomycin) absorbable antibiotic beads.;  Surgeon: Prasanth Jensen MD;  Location: [facility] OR     IRRIGATION AND DEBRIDEMENT LOWER EXTREMITY, COMBINED Right 12/8/2019    Procedure: DEBRIDEMENT OF RIGHT CALF AND WOUND CLOSURE.;  Surgeon: Sara Martinez MD;  Location: [facility] OR     IRRIGATION AND DEBRIDEMENT UPPER EXTREMITY, COMBINED Right 1/7/2023    Procedure: Right elbow arthrotomy, irrigation and debridement;  Surgeon: Jose A Christine MD;  Location:  OR     KNEE SURGERY  2001 Right knee arthroscopy     OPTICAL TRACKING SYSTEM FUSION SPINE POSTERIOR LUMBAR THREE+ LEVELS N/A 10/29/2015    Procedure: OPTICAL TRACKING SYSTEM FUSION SPINE POSTERIOR LUMBAR THREE+ LEVELS;  Surgeon: Walt Garcia MD;  Location:  OR     PROSTATE SURGERY      radioactive seeding 6/16/08     REPAIR ANEURYSM ABDOMINAL AORTA  06/08     REPAIR VALVE MITRAL  10/16/2013    SJM 21([id number] [id number]):AVR, SJM 27 :MVRProcedure: REPAIR VALVE MITRAL;  REDO STERNOTOMY/REDO AORTIC VALVE REPLACEMENT/ MITRAL VALVE REPLACEMENT/REIMPLANTATION OF RIGHT CORONARY ARTERY BYPASS WITH RACHAEL ( ON PUMP);  Surgeon: Viet Singh MD;  Location:  OR     REPLACE VALVE AORTIC  10/16/2013    Procedure: REPLACE VALVE AORTIC;;  Surgeon: Viet Singh MD;  Location:  OR     SURGERY GENERAL IP CONSULT  05/2008 Excision aneurysm abdominal aorta     SURGERY GENERAL IP CONSULT  1997 Vocal cord polypectomy     VASCULAR SURGERY  1968, 1993     varicose vein stripping     ZZC CABG, VEIN, TWO  1/3/06    Left radial to RCA, LIMA to LAD (RA to RCA  reimplanted at time of 2013 surg)       Medications (outpatient):  Current Outpatient Medications   Medication Sig Dispense Refill     acetaminophen (TYLENOL) 325 MG tablet Take 650 mg by mouth 2 times daily.       amoxicillin-clavulanate (AUGMENTIN) 875-125 MG tablet Take 1 tablet by mouth daily. 90 tablet 3     [START ON 11/1/2025] aspirin 81 MG EC tablet Take 1 tablet (81 mg) by mouth daily.       clopidogrel (PLAVIX) 75 MG tablet Take 1 tablet (75 mg) by mouth daily.       enoxaparin ANTICOAGULANT (LOVENOX) 100 MG/ML syringe Inject 0.9 mLs (90 mg) subcutaneously every 12 hours. (Patient taking differently: Inject 90 mg subcutaneously as needed. Takes as needed) 12.6 mL 0     ezetimibe (ZETIA) 10 MG tablet Take 1 tablet (10 mg) by mouth daily. 100 tablet 3     ferrous sulfate (FEROSUL) 325 (65 Fe) MG tablet Take 325 mg by mouth daily (with breakfast)       fluocinonide (LIDEX) 0.05 % external ointment Apply topically 2 times daily. 60 g 11     glucosamine-chondroitin 500-400 MG CAPS per capsule Take 1 capsule by mouth 2 times daily.       mesalamine (ASACOL HD) 800 MG EC tablet Take 1 tablet (800 mg) by mouth 2 times daily. 200 tablet 3     metoprolol succinate ER (TOPROL XL) 50 MG 24 hr tablet One tablet (50mg) daily       nitroGLYcerin (NITROSTAT) 0.4 MG sublingual tablet For chest pain place 1 tablet under the tongue every 5 minutes for 3 doses. If symptoms persist 5 minutes after 1st dose call 911. 30 tablet 0     rosuvastatin (CRESTOR) 40 MG tablet Take 1 tablet (40 mg) by mouth daily. 100 tablet 3     tamsulosin (FLOMAX) 0.4 MG capsule Take 1 capsule (0.4 mg) by mouth daily. 90 capsule 3     warfarin ANTICOAGULANT (COUMADIN) 5 MG tablet Take 1 tablet (5 mg) every Tu & Thur / Take 1 1/2 tablets (7.5mg) all other days OR per INR clinic 120 tablet 1     cholecalciferol 25 MCG (1000 UT) TABS Take 1,000 Units by mouth daily (Patient not taking: Reported on 7/3/2025)       fluticasone (FLONASE) 50 MCG/ACT nasal  "spray Spray 1 spray into both nostrils daily. (Patient not taking: Reported on 7/3/2025) 16 g 0       Allergies:  Allergies   Allergen Reactions     Bees Anaphylaxis       Social History:   History   Drug Use No      History   Smoking Status     Former     Types: Cigarettes   Smokeless Tobacco     Never     Social History    Substance and Sexual Activity      Alcohol use: Yes        Comment: a couple beers per week (socially)       Family History:  Family History   Problem Relation Age of Onset     No Known Problems Mother      Coronary Artery Disease Father         CABG     Heart Disease Father         Pacemaker     No Known Problems Brother      No Known Problems Sister      No Known Problems Son      Other Cancer Daughter      No Known Problems Daughter      Heart Disease Brother      Other - See Comments Grandchild          Objective & Physical Exam:  /72   Pulse 70   Ht 1.651 m (5' 5\")   Wt 90.7 kg (200 lb)   BMI 33.28 kg/m    Wt Readings from Last 2 Encounters:   07/03/25 90.7 kg (200 lb)   12/19/24 90.7 kg (199 lb 14.4 oz)     Body mass index is 33.28 kg/m .   Body surface area is 2.04 meters squared.    Constitutional: appears stated age, in no apparent distress, appears to be well nourished  Pulmonary: clear to auscultation bilaterally, no wheezes  Cardiovascular: JVP normal, regular rate, regular rhythm, normal mechanical valve sounds, no murmurs, no lower extremity edema  Gastrointestinal: no guarding, non-rigid   Neurologic: awake, alert, moves all extremities  Skin: no jaundice, warm on limited exam    Data reviewed:  Lab Results   Component Value Date    WBC 8.9 11/16/2024    WBC 7.6 06/15/2020    RBC 4.30 (L) 11/16/2024    RBC 4.44 06/15/2020    HGB 12.7 (L) 11/16/2024    HGB 13.0 (L) 06/15/2020    HCT 37.4 (L) 11/16/2024    HCT 40.0 06/15/2020    MCV 87 11/16/2024    MCV 90 06/15/2020    MCH 29.5 11/16/2024    MCH 29.3 06/15/2020    MCHC 34.0 11/16/2024    MCHC 32.5 06/15/2020    RDW 13.2 " 11/16/2024    RDW 13.3 06/15/2020     11/16/2024     06/15/2020     Sodium   Date Value Ref Range Status   07/02/2025 140 135 - 145 mmol/L Final   06/01/2020 136 133 - 144 mmol/L Final     Potassium   Date Value Ref Range Status   07/02/2025 4.6 3.4 - 5.3 mmol/L Final   08/03/2022 4.0 3.4 - 5.3 mmol/L Final   06/01/2020 4.0 3.4 - 5.3 mmol/L Final     Chloride   Date Value Ref Range Status   07/02/2025 103 98 - 107 mmol/L Final   08/03/2022 107 94 - 109 mmol/L Final   06/01/2020 106 94 - 109 mmol/L Final     Carbon Dioxide   Date Value Ref Range Status   06/01/2020 26 20 - 32 mmol/L Final     Carbon Dioxide (CO2)   Date Value Ref Range Status   07/02/2025 26 22 - 29 mmol/L Final   08/03/2022 27 20 - 32 mmol/L Final     Anion Gap   Date Value Ref Range Status   07/02/2025 11 7 - 15 mmol/L Final   08/03/2022 6 3 - 14 mmol/L Final   06/01/2020 4 3 - 14 mmol/L Final     Glucose   Date Value Ref Range Status   07/02/2025 147 (H) 70 - 99 mg/dL Final   08/03/2022 108 (H) 70 - 99 mg/dL Final   06/01/2020 102 (H) 70 - 99 mg/dL Final     GLUCOSE BY METER POCT   Date Value Ref Range Status   01/08/2023 137 (H) 70 - 99 mg/dL Final     Urea Nitrogen   Date Value Ref Range Status   07/02/2025 14.0 8.0 - 23.0 mg/dL Final   08/03/2022 15 7 - 30 mg/dL Final   06/01/2020 13 7 - 30 mg/dL Final     Creatinine   Date Value Ref Range Status   07/02/2025 0.89 0.67 - 1.17 mg/dL Final   06/01/2020 0.73 0.66 - 1.25 mg/dL Final     GFR Estimate   Date Value Ref Range Status   07/02/2025 85 >60 mL/min/1.73m2 Final     Comment:     eGFR calculated using 2021 CKD-EPI equation.   10/06/2020 72 >60 mL/min/[1.73_m2] Final     GFR, ESTIMATED POCT   Date Value Ref Range Status   10/12/2022 >60 >60 mL/min/1.73m2 Final     Calcium   Date Value Ref Range Status   07/02/2025 9.6 8.8 - 10.4 mg/dL Final   06/01/2020 8.5 8.5 - 10.1 mg/dL Final     Bilirubin Total   Date Value Ref Range Status   12/10/2024 0.3 <=1.2 mg/dL Final   06/01/2020 0.3  0.2 - 1.3 mg/dL Final     Alkaline Phosphatase   Date Value Ref Range Status   12/10/2024 59 40 - 150 U/L Final   06/01/2020 75 40 - 150 U/L Final     ALT   Date Value Ref Range Status   07/02/2025 27 0 - 70 U/L Final   06/01/2020 16 0 - 70 U/L Final     AST   Date Value Ref Range Status   12/10/2024 36 0 - 45 U/L Final   06/01/2020 19 0 - 45 U/L Final     Recent Labs   Lab Test 07/02/25  0840 11/14/24  1739   CHOL 133 124   HDL 37* 30*   LDL 73 76   TRIG 114 92        Thank you for allowing me to participate in the care of your patient.      Sincerely,     Dennis Huerta MD     [facility]  cc:   Calderon Santo MD  No address on file

## 2025-07-14 ENCOUNTER — TELEPHONE (OUTPATIENT)
Dept: OTHER | Facility: CLINIC | Age: 85
End: 2025-07-14
Payer: MEDICARE

## 2025-07-14 NOTE — TELEPHONE ENCOUNTER
LM for patient to call and schedule imaging CTA Abdomen Pelvis with Contrast in August, 2025.    Patient will be called with the results.    Appt Note:  h/o EVAR Type II endoleak, SMA and celiac stenosis comparison study done 8/17/2024. 1 year follow up RN Call with results

## 2025-07-22 ENCOUNTER — LAB (OUTPATIENT)
Dept: LAB | Facility: CLINIC | Age: 85
End: 2025-07-22
Payer: MEDICARE

## 2025-07-22 ENCOUNTER — ANTICOAGULATION THERAPY VISIT (OUTPATIENT)
Dept: ANTICOAGULATION | Facility: CLINIC | Age: 85
End: 2025-07-22

## 2025-07-22 DIAGNOSIS — Z95.2 S/P MITRAL VALVE REPLACEMENT: ICD-10-CM

## 2025-07-22 DIAGNOSIS — I48.11 LONGSTANDING PERSISTENT ATRIAL FIBRILLATION (H): ICD-10-CM

## 2025-07-22 DIAGNOSIS — Z95.2 S/P MITRAL VALVE REPLACEMENT: Primary | ICD-10-CM

## 2025-07-22 DIAGNOSIS — Z79.01 LONG TERM CURRENT USE OF ANTICOAGULANT THERAPY: ICD-10-CM

## 2025-07-22 LAB — INR BLD: 3.1 (ref 0.9–1.1)

## 2025-07-22 PROCEDURE — 36416 COLLJ CAPILLARY BLOOD SPEC: CPT

## 2025-07-22 PROCEDURE — 85610 PROTHROMBIN TIME: CPT

## 2025-07-22 NOTE — PROGRESS NOTES
ANTICOAGULATION MANAGEMENT     Fausto Farr 85 year old male is on warfarin with therapeutic INR result. (Goal INR 2.5-3.5)    Recent labs: (last 7 days)     07/22/25  0906   INR 3.1*       ASSESSMENT     Source(s): Chart Review  Previous INR was Therapeutic last 2(+) visits  Medication, diet, health changes since last INR: chart reviewed; none identified         PLAN     Recommended plan for no diet, medication or health factor changes affecting INR     Dosing Instructions: Continue your current warfarin dose with next INR in 5 weeks       Summary  As of 7/22/2025      Full warfarin instructions:  7.5 mg every Mon, Fri; 5 mg all other days   Next INR check:  8/26/2025               Detailed voice message left for Ralph with dosing instructions and follow up date.   Sent [employer] message with dosing and follow up instructions    Contact [phone] to schedule and with any changes, questions or concerns.     Education provided: Please call back if any changes to your diet, medications or how you've been taking warfarin    Plan made per [facility] anticoagulation protocol    Maria Luz Rios RN  7/22/2025  Anticoagulation Clinic  [email or web address] [place] for routing messages: joel DOWELL  [facility] patient phone line: [phone]        _______________________________________________________________________     Anticoagulation Episode Summary       Current INR goal:  2.5-3.5   TTR:  50.2% (1 y)   Target end date:  Indefinite   Send INR reminders to:  SHANNA DOWELL    Indications    S/P mitral valve replacement [Z95.2]  Long term current use of anticoagulant therapy [Z79.01]  Longstanding persistent atrial fibrillation (H) [I48.11]             Comments:  Per 9/20/22 Cardio Note strict INR goal of 2.5-3.5. If INR falls below 2.5 at any time, needs to be bridged with Lovenox. consent to leave DVM              Anticoagulation Care Providers       Provider Role Specialty Phone number    Jodi Flower Mai, MD Referring Internal  Medicine - Pediatrics [phone]    Kerwin Ramos MD Referring Internal Medicine [phone]

## 2025-08-26 ENCOUNTER — LAB (OUTPATIENT)
Dept: LAB | Facility: CLINIC | Age: 85
End: 2025-08-26
Payer: MEDICARE

## 2025-08-26 ENCOUNTER — ANTICOAGULATION THERAPY VISIT (OUTPATIENT)
Dept: ANTICOAGULATION | Facility: CLINIC | Age: 85
End: 2025-08-26

## 2025-08-26 DIAGNOSIS — Z95.2 S/P MITRAL VALVE REPLACEMENT: ICD-10-CM

## 2025-08-26 DIAGNOSIS — I48.11 LONGSTANDING PERSISTENT ATRIAL FIBRILLATION (H): ICD-10-CM

## 2025-08-26 DIAGNOSIS — Z95.2 S/P MITRAL VALVE REPLACEMENT: Primary | ICD-10-CM

## 2025-08-26 DIAGNOSIS — Z79.01 LONG TERM CURRENT USE OF ANTICOAGULANT THERAPY: ICD-10-CM

## 2025-08-26 LAB — INR BLD: 3.3 (ref 0.9–1.1)

## 2025-08-26 PROCEDURE — 36416 COLLJ CAPILLARY BLOOD SPEC: CPT

## 2025-08-26 PROCEDURE — 85610 PROTHROMBIN TIME: CPT

## 2025-08-28 ENCOUNTER — HOSPITAL ENCOUNTER (OUTPATIENT)
Dept: CT IMAGING | Facility: CLINIC | Age: 85
End: 2025-08-28
Attending: RADIOLOGY
Payer: MEDICARE

## 2025-08-28 DIAGNOSIS — I71.43 INFRARENAL ABDOMINAL AORTIC ANEURYSM (AAA) WITHOUT RUPTURE: ICD-10-CM

## 2025-08-28 LAB
CREAT BLD-MCNC: 1.1 MG/DL (ref 0.7–1.2)
EGFRCR SERPLBLD CKD-EPI 2021: >60 ML/MIN/1.73M2

## 2025-08-28 PROCEDURE — 82565 ASSAY OF CREATININE: CPT

## 2025-08-28 PROCEDURE — 255N000002 HC RX 255 OP 636: Performed by: RADIOLOGY

## 2025-08-28 PROCEDURE — 74174 CTA ABD&PLVS W/CONTRAST: CPT

## 2025-08-28 PROCEDURE — 250N000009 HC RX 250: Performed by: RADIOLOGY

## 2025-08-28 RX ADMIN — SODIUM CHLORIDE 80 ML: 9 INJECTION, SOLUTION INTRAVENOUS at 09:04

## 2025-08-28 RX ADMIN — IOHEXOL 76 ML: 350 INJECTION, SOLUTION INTRAVENOUS at 09:04

## 2025-09-02 DIAGNOSIS — Z95.2 S/P AORTIC VALVE REPLACEMENT: ICD-10-CM

## 2025-09-02 DIAGNOSIS — I48.19 PERSISTENT ATRIAL FIBRILLATION (H): ICD-10-CM

## 2025-09-02 DIAGNOSIS — Z95.2 S/P MITRAL VALVE REPLACEMENT: ICD-10-CM

## 2025-09-02 RX ORDER — WARFARIN SODIUM 5 MG/1
5-7.5 TABLET ORAL EVERY EVENING
Qty: 115 TABLET | Refills: 1 | Status: SHIPPED | OUTPATIENT
Start: 2025-09-02

## 2025-09-03 ENCOUNTER — TELEPHONE (OUTPATIENT)
Dept: OTHER | Facility: CLINIC | Age: 85
End: 2025-09-03
Payer: MEDICARE

## 2025-09-03 DIAGNOSIS — I71.43 INFRARENAL ABDOMINAL AORTIC ANEURYSM (AAA) WITHOUT RUPTURE: Primary | ICD-10-CM

## (undated) DEVICE — SU ETHILON 4-0 PS-2 18" 1667G

## (undated) DEVICE — GLOVE PROTEXIS BLUE W/NEU-THERA 6.5  2D73EB65

## (undated) DEVICE — SOL ADH LIQUID BENZOIN SWAB 0.6ML C1544

## (undated) DEVICE — ESU PENCIL W/SMOKE EVAC CVPLP2000

## (undated) DEVICE — GLOVE PROTEXIS W/NEU-THERA 7.0  2D73TE70

## (undated) DEVICE — APPLICATOR COTTON TIP 6"X2 STERILE LF 6012

## (undated) DEVICE — PACK MINOR SBA15MIFSE

## (undated) DEVICE — DECANTER BAG 2002S

## (undated) DEVICE — SHEATH PRELUDE SNAP 13CM 6FR

## (undated) DEVICE — BAG CLEAR TRASH 1.3M 39X33" P4040C

## (undated) DEVICE — INTRO TERUMO 6FRX25CM W/MARKER RSB603

## (undated) DEVICE — CATH US OD 5FR OD SEC 2.9FR EAGLE EYE PLATINUM 0.014 85900P

## (undated) DEVICE — SUCTION MANIFOLD NEPTUNE 2 SYS 4 PORT 0702-020-000

## (undated) DEVICE — DRAPE SHEET REV FOLD 3/4 9349

## (undated) DEVICE — SU ETHILON 3-0 PS-2 18" 1669H

## (undated) DEVICE — PACK TOTAL KNEE BOXED LATEX FREE PO15TKFCT

## (undated) DEVICE — DRAPE STOCKINETTE 6" 8564

## (undated) DEVICE — CATH GUIDING BLUE YELLOW PTFE XB3.5 6FRX100CM 67005400

## (undated) DEVICE — SU VICRYL 3-0 SH 27" J316H

## (undated) DEVICE — SU CHROMIC 4-0 RB-1 27" U203H

## (undated) DEVICE — ESU GROUND PAD ADULT W/CORD E7507

## (undated) DEVICE — INTRODUCER SHEATH GREEN 6.5FRX11CM .038IN PSI-6F-11-038ACT

## (undated) DEVICE — BLADE DERMATOME ZIMMER  00-8800-000-10

## (undated) DEVICE — DRSG ABDOMINAL 07 1/2X8" 7197D

## (undated) DEVICE — SYR RED NITRO PRNT 10CC

## (undated) DEVICE — LINEN ORTHO ACL PACK 5447

## (undated) DEVICE — LINEN FULL SHEET 5511

## (undated) DEVICE — GLOVE PROTEXIS POWDER FREE 7.0 ORTHOPEDIC 2D73ET70

## (undated) DEVICE — DRSG GAUZE 4X4" 8044

## (undated) DEVICE — CATH ANGIO JUDKINS R4 6FRX100CM INFINITI 534621T

## (undated) DEVICE — TOURNIQUET CUFF 30" REPRO BLUE 60-7070-105

## (undated) DEVICE — CATH BLAZER DX-20 DUO-DECAPOLA

## (undated) DEVICE — SU VICRYL 2-0 CT-1 27" UND J259H

## (undated) DEVICE — IMM KNEE 20"

## (undated) DEVICE — CATH ANGIO INFINITI MPA2 4FRX100CM 2 SH 538442

## (undated) DEVICE — DRAIN JACKSON PRATT 10FR ROUND SU130-1321

## (undated) DEVICE — PEN MARKING SKIN W/LABELS 31145884

## (undated) DEVICE — CAST PADDING 6" STERILE 9046S

## (undated) DEVICE — SHEATH PINNACLE DESTINATION 6FRX45CM ST

## (undated) DEVICE — BNDG ELASTIC 6"X5YDS DOUBLE STERILE

## (undated) DEVICE — SUCTION CANISTER MEDIVAC LINER 3000ML W/LID 65651-530

## (undated) DEVICE — KIT LG BORE TOUHY ACCESS PLUS MAP152

## (undated) DEVICE — CLIP APPLIER 09 3/8" SM LIGACLIP MCS20

## (undated) DEVICE — GLOVE PROTEXIS POWDER FREE 7.5 ORTHOPEDIC 2D73ET75

## (undated) DEVICE — DRAPE LAP W/ARMBOARD 29410

## (undated) DEVICE — APPLICATORS COTTON TIP 6"X2 STERILE LF C15053-006

## (undated) DEVICE — CAST PADDING 4" STERILE 9044S

## (undated) DEVICE — DRSG XEROFORM 5X9" 8884431605

## (undated) DEVICE — TUBING SUCTION 12"X1/4" N612

## (undated) DEVICE — GLOVE PROTEXIS BLUE W/NEU-THERA 7.5  2D73EB75

## (undated) DEVICE — BNDG ELASTIC 4" DBL LENGTH UNSTERILE 6611-14

## (undated) DEVICE — SPONGE LAP 18X18" X8435

## (undated) DEVICE — PACK HAND SOP32HARMO

## (undated) DEVICE — DRAPE STOCKINETTE IMPERVIOUS 12" 1587

## (undated) DEVICE — PACK PCMKR PERM SRG PROC LF SAN32PC573

## (undated) DEVICE — TOURNIQUET SGL BLADDER 18"X4" RED 5921-218-135

## (undated) DEVICE — SU PDS II 2-0 CT-2 27"  Z333H

## (undated) DEVICE — PACK EXTREMITY SOP15EXFSD

## (undated) DEVICE — ESU GROUND PAD UNIVERSAL W/O CORD

## (undated) DEVICE — BLADE KNIFE SURG 10 371110

## (undated) DEVICE — LINEN HALF SHEET 5512

## (undated) DEVICE — SU ETHILON 4-0 PS-2 18" BLACK 1667H

## (undated) DEVICE — CAST PADDING 4" UNSTERILE 9044

## (undated) DEVICE — DRSG TEGADERM 2 3/8X2 3/4" 1624W

## (undated) DEVICE — SET HANDPIECE INTERPULSE W/COAXIAL FAN SPRAY TIP 0210118000

## (undated) DEVICE — SOL NACL 0.9% IRRIG 1000ML BOTTLE 2F7124

## (undated) DEVICE — PREP POVIDONE IODINE SCRUB 7.5% 4OZ APL82212

## (undated) DEVICE — DEFIB PRO-PADZ LVP LQD GEL ADULT 8900-2105-01

## (undated) DEVICE — TUBE CULTURE ANAEROBIC PORT-A-CUL 11ML

## (undated) DEVICE — DRAPE EXTREMITY W/ARMBOARD 29405

## (undated) DEVICE — RAD G/W INQWIRE .035X260CM J-TIP EXCHANGE IQ35F260J1O5RS

## (undated) DEVICE — CATH BALLOON NC EMERGE 3.50X20MM H7493926720350

## (undated) DEVICE — RAD INFLATOR BASIC COMPAK  IN4130

## (undated) DEVICE — Device

## (undated) DEVICE — ESU HOLDER LAP INST DISP PURPLE LONG 330MM H-PRO-330

## (undated) DEVICE — PREP CHLORAPREP 26ML TINTED ORANGE  260815

## (undated) DEVICE — CATH ANGIO INFINITI IM 4FRX100CM 538460

## (undated) DEVICE — SU MONOCRYL 3-0 PS-2 27" Y427H

## (undated) DEVICE — GLOVE PROTEXIS W/NEU-THERA 7.5  2D73TE75

## (undated) DEVICE — DRAPE CONVERTORS U-DRAPE 60X72" 8476

## (undated) DEVICE — SU ETHILON 3-0 PS-1 18" 1663G

## (undated) DEVICE — MANIFOLD KIT ANGIO AUTOMATED 014613

## (undated) DEVICE — KIT HAND CONTROL ANGIOTOUCH ACIST 65CM AT-P65

## (undated) DEVICE — DRAPE MAYO STAND 23X54 8337

## (undated) DEVICE — PREP SKIN SCRUB TRAY 4461A

## (undated) DEVICE — DRSG AQUACEL AG 3.5X9.75" HYDROFIBER 412011

## (undated) DEVICE — SUCTION TIP YANKAUER W/O VENT K86

## (undated) DEVICE — PACK EP SRG PROC LF DISP SAN32EPFSR

## (undated) DEVICE — CATH GUIDELINER 6FR 5571

## (undated) DEVICE — CATH BALLOON NC EMERGE 4.00X12MM H7493926712400

## (undated) DEVICE — CATH ANGIO INFINITI 3DRC 4FRX100CM 538476

## (undated) DEVICE — DRAPE STERI U 1015

## (undated) DEVICE — TUBING IRRIG CYSTO/BLADDER SET 81" LF 2C4040

## (undated) DEVICE — GUIDEWIRE 180CM .035IN VASC STR FLXB

## (undated) DEVICE — TUBE CULTURE AEROBIC/ANAEROBIC W/O SWABS A.C.T.I.1. 12401

## (undated) DEVICE — CATH 8MM NAVISTAR F-J CURVE BN7TCFJ8L

## (undated) DEVICE — CATH ANGIO JUDKINS JL4 6FRX100CM INFINITI 534620T

## (undated) DEVICE — SU MONOCRYL 4-0 PS-2 18" UND Y496G

## (undated) DEVICE — GLOVE BIOGEL PI SZ 7.5 40875

## (undated) DEVICE — DERMACARRIER 1.5:1 ZIMMER 00-2195-012-00

## (undated) DEVICE — PATCH CARTO 3 EXTERNAL REFERENCE 3D MAPPING CREFP6

## (undated) DEVICE — BLADE KNIFE SURG 15 371115

## (undated) DEVICE — INTRO CATH 12CM 7FR MAX ACT

## (undated) DEVICE — INTRO GLIDESHEATH SLENDER 6FR 10X45CM 60-1060

## (undated) DEVICE — SU ETHILON 2-0 FS 18" 664H

## (undated) DEVICE — CATH BALLOON NC EMERGE 3.00X20MM H7493926720300

## (undated) DEVICE — DRAIN JACKSON PRATT RESERVOIR 100ML SU130-1305

## (undated) DEVICE — SU PDS II 3-0 SH 27" Z316H

## (undated) DEVICE — INTRO SHEATH 4FRX25CM PINNACLE MARKER RSB403

## (undated) DEVICE — BASIN SET MAJOR

## (undated) DEVICE — SYR 03ML LL W/O NDL 309657

## (undated) DEVICE — PREP POVIDONE IODINE SOLUTION 10% 4OZ

## (undated) DEVICE — GLOVE BIOGEL PI MICRO INDICATOR UNDERGLOVE SZ 8.0 48980

## (undated) DEVICE — CLIP APPLIER 11" MED LIGACLIP MCM20

## (undated) DEVICE — DRSG XEROFORM 1X8"

## (undated) DEVICE — BONE CEMENT MIXEVAC III HI VAC KIT  0206-015-000

## (undated) DEVICE — CATH EP 6FR 2MM TIP 2-8-2 115C

## (undated) DEVICE — BLADE CLIPPER SGL USE 9680

## (undated) DEVICE — SU ETHILON 3-0 FS-1 18" 663G

## (undated) DEVICE — CATH ANGIO INFINITI JL4 4FRX100CM 538420

## (undated) DEVICE — SYR 10ML LL W/O NDL 302995

## (undated) DEVICE — SOL WATER IRRIG 1000ML BOTTLE 2F7114

## (undated) DEVICE — GW VASC OMNIWIRE J L185CM PRESSURE 89185J

## (undated) DEVICE — LINEN TOWEL PACK X5 5464

## (undated) DEVICE — PAD CHUX UNDERPAD 23X24" 7136

## (undated) DEVICE — ESU PENCIL W/HOLSTER E2350H

## (undated) DEVICE — CATH DIAG 4FR JL 4.5 538417

## (undated) DEVICE — RAD INTRODUCER KIT MICRO 5FRX10CM .018 NITINOL G/W

## (undated) DEVICE — SU ETHILON 3-0 FS-1 18" 669H

## (undated) DEVICE — SU VICRYL 1 CP-1 36" J474H

## (undated) DEVICE — DRAIN JACKSON PRATT 07FR ROUND SU130-1320

## (undated) DEVICE — INTRODUCER CATH VASC 5FRX10CM  MPIS-501-NT-U-SST

## (undated) DEVICE — BLADE SAW SAGITTAL STRK 25X90X1.27MM HD SYS 6 6125-127-090

## (undated) DEVICE — GLOVE BIOGEL PI MICRO INDICATOR UNDERGLOVE SZ 7.5 48975

## (undated) DEVICE — GLOVE BIOGEL PI SZ 8.0 40880

## (undated) DEVICE — CATH BALLOON NC EMERGE 3.00X12MM H7493926712300

## (undated) DEVICE — INTRO CATH 12CM 8.5FR FST-CATH

## (undated) DEVICE — GUIDEWIRE VASC 0.014INX180CM RUNTHROUGH 25-1011

## (undated) DEVICE — PREP CHLORAPREP 26ML TINTED HI-LITE ORANGE 930815

## (undated) DEVICE — NDL 19GA 1.5"

## (undated) DEVICE — VALVE HEMOSTASIS .096" COPILOT MECH 1003331

## (undated) DEVICE — DRSG KERLIX 4 1/2"X4YDS ROLL 6715

## (undated) DEVICE — PACK LOWER EXTREMITY RIDGES

## (undated) DEVICE — KIT CULTURE ESWAB AEROBE/ANAEROBE WHITE TOP R723480

## (undated) RX ORDER — FENTANYL CITRATE 50 UG/ML
INJECTION, SOLUTION INTRAMUSCULAR; INTRAVENOUS
Status: DISPENSED
Start: 2023-01-07

## (undated) RX ORDER — MAGNESIUM SULFATE HEPTAHYDRATE 40 MG/ML
INJECTION, SOLUTION INTRAVENOUS
Status: DISPENSED
Start: 2024-05-24

## (undated) RX ORDER — GLYCOPYRROLATE 0.2 MG/ML
INJECTION, SOLUTION INTRAMUSCULAR; INTRAVENOUS
Status: DISPENSED
Start: 2019-11-12

## (undated) RX ORDER — LIDOCAINE HYDROCHLORIDE 10 MG/ML
INJECTION, SOLUTION EPIDURAL; INFILTRATION; INTRACAUDAL; PERINEURAL
Status: DISPENSED
Start: 2022-08-02

## (undated) RX ORDER — FENTANYL CITRATE 50 UG/ML
INJECTION, SOLUTION INTRAMUSCULAR; INTRAVENOUS
Status: DISPENSED
Start: 2019-11-12

## (undated) RX ORDER — DEXAMETHASONE SODIUM PHOSPHATE 4 MG/ML
INJECTION, SOLUTION INTRA-ARTICULAR; INTRALESIONAL; INTRAMUSCULAR; INTRAVENOUS; SOFT TISSUE
Status: DISPENSED
Start: 2019-07-22

## (undated) RX ORDER — PROPOFOL 10 MG/ML
INJECTION, EMULSION INTRAVENOUS
Status: DISPENSED
Start: 2019-07-22

## (undated) RX ORDER — BUPIVACAINE HYDROCHLORIDE 5 MG/ML
INJECTION, SOLUTION EPIDURAL; INTRACAUDAL
Status: DISPENSED
Start: 2022-08-02

## (undated) RX ORDER — PROPOFOL 10 MG/ML
INJECTION, EMULSION INTRAVENOUS
Status: DISPENSED
Start: 2019-08-29

## (undated) RX ORDER — VANCOMYCIN HYDROCHLORIDE 1 G/20ML
INJECTION, POWDER, LYOPHILIZED, FOR SOLUTION INTRAVENOUS
Status: DISPENSED
Start: 2020-02-12

## (undated) RX ORDER — HEPARIN SODIUM 1000 [USP'U]/ML
INJECTION, SOLUTION INTRAVENOUS; SUBCUTANEOUS
Status: DISPENSED
Start: 2019-04-22

## (undated) RX ORDER — LIDOCAINE HYDROCHLORIDE 10 MG/ML
INJECTION, SOLUTION EPIDURAL; INFILTRATION; INTRACAUDAL; PERINEURAL
Status: DISPENSED
Start: 2023-01-07

## (undated) RX ORDER — FENTANYL CITRATE 50 UG/ML
INJECTION, SOLUTION INTRAMUSCULAR; INTRAVENOUS
Status: DISPENSED
Start: 2019-07-22

## (undated) RX ORDER — LIDOCAINE HYDROCHLORIDE 10 MG/ML
INJECTION, SOLUTION EPIDURAL; INFILTRATION; INTRACAUDAL; PERINEURAL
Status: DISPENSED
Start: 2019-07-22

## (undated) RX ORDER — FENTANYL CITRATE 50 UG/ML
INJECTION, SOLUTION INTRAMUSCULAR; INTRAVENOUS
Status: DISPENSED
Start: 2019-08-29

## (undated) RX ORDER — KETOROLAC TROMETHAMINE 30 MG/ML
INJECTION, SOLUTION INTRAMUSCULAR; INTRAVENOUS
Status: DISPENSED
Start: 2019-08-29

## (undated) RX ORDER — HEPARIN SODIUM 200 [USP'U]/100ML
INJECTION, SOLUTION INTRAVENOUS
Status: DISPENSED
Start: 2024-11-14

## (undated) RX ORDER — OXYCODONE HYDROCHLORIDE 5 MG/1
TABLET ORAL
Status: DISPENSED
Start: 2023-01-06

## (undated) RX ORDER — LIDOCAINE HYDROCHLORIDE 10 MG/ML
INJECTION, SOLUTION EPIDURAL; INFILTRATION; INTRACAUDAL; PERINEURAL
Status: DISPENSED
Start: 2024-11-14

## (undated) RX ORDER — GLYCOPYRROLATE 0.2 MG/ML
INJECTION, SOLUTION INTRAMUSCULAR; INTRAVENOUS
Status: DISPENSED
Start: 2018-05-21

## (undated) RX ORDER — EPHEDRINE SULFATE 50 MG/ML
INJECTION, SOLUTION INTRAMUSCULAR; INTRAVENOUS; SUBCUTANEOUS
Status: DISPENSED
Start: 2020-02-12

## (undated) RX ORDER — DEXAMETHASONE SODIUM PHOSPHATE 4 MG/ML
INJECTION, SOLUTION INTRA-ARTICULAR; INTRALESIONAL; INTRAMUSCULAR; INTRAVENOUS; SOFT TISSUE
Status: DISPENSED
Start: 2019-08-29

## (undated) RX ORDER — ACETAMINOPHEN 325 MG/1
TABLET ORAL
Status: DISPENSED
Start: 2019-07-22

## (undated) RX ORDER — PROPOFOL 10 MG/ML
INJECTION, EMULSION INTRAVENOUS
Status: DISPENSED
Start: 2020-02-12

## (undated) RX ORDER — HYDROMORPHONE HYDROCHLORIDE 1 MG/ML
INJECTION, SOLUTION INTRAMUSCULAR; INTRAVENOUS; SUBCUTANEOUS
Status: DISPENSED
Start: 2019-11-12

## (undated) RX ORDER — CEFAZOLIN SODIUM 1 G/3ML
INJECTION, POWDER, FOR SOLUTION INTRAMUSCULAR; INTRAVENOUS
Status: DISPENSED
Start: 2019-11-12

## (undated) RX ORDER — PROPOFOL 10 MG/ML
INJECTION, EMULSION INTRAVENOUS
Status: DISPENSED
Start: 2019-11-12

## (undated) RX ORDER — CEFAZOLIN SODIUM 2 G/100ML
INJECTION, SOLUTION INTRAVENOUS
Status: DISPENSED
Start: 2019-08-29

## (undated) RX ORDER — MINERAL OIL
OIL (ML) MISCELLANEOUS
Status: DISPENSED
Start: 2019-12-08

## (undated) RX ORDER — VERAPAMIL HYDROCHLORIDE 2.5 MG/ML
INJECTION, SOLUTION INTRAVENOUS
Status: DISPENSED
Start: 2024-11-14

## (undated) RX ORDER — FENTANYL CITRATE 50 UG/ML
INJECTION, SOLUTION INTRAMUSCULAR; INTRAVENOUS
Status: DISPENSED
Start: 2024-11-14

## (undated) RX ORDER — FENTANYL CITRATE 50 UG/ML
INJECTION, SOLUTION INTRAMUSCULAR; INTRAVENOUS
Status: DISPENSED
Start: 2020-02-12

## (undated) RX ORDER — DEXAMETHASONE SODIUM PHOSPHATE 4 MG/ML
INJECTION, SOLUTION INTRA-ARTICULAR; INTRALESIONAL; INTRAMUSCULAR; INTRAVENOUS; SOFT TISSUE
Status: DISPENSED
Start: 2020-02-12

## (undated) RX ORDER — DEXAMETHASONE SODIUM PHOSPHATE 4 MG/ML
INJECTION, SOLUTION INTRA-ARTICULAR; INTRALESIONAL; INTRAMUSCULAR; INTRAVENOUS; SOFT TISSUE
Status: DISPENSED
Start: 2023-01-07

## (undated) RX ORDER — FENTANYL CITRATE 50 UG/ML
INJECTION, SOLUTION INTRAMUSCULAR; INTRAVENOUS
Status: DISPENSED
Start: 2022-08-02

## (undated) RX ORDER — ADENOSINE 3 MG/ML
INJECTION, SOLUTION INTRAVENOUS
Status: DISPENSED
Start: 2024-11-14

## (undated) RX ORDER — HYDROMORPHONE HYDROCHLORIDE 1 MG/ML
INJECTION, SOLUTION INTRAMUSCULAR; INTRAVENOUS; SUBCUTANEOUS
Status: DISPENSED
Start: 2020-02-12

## (undated) RX ORDER — REGADENOSON 0.08 MG/ML
INJECTION, SOLUTION INTRAVENOUS
Status: DISPENSED
Start: 2024-11-13

## (undated) RX ORDER — NITROGLYCERIN 5 MG/ML
VIAL (ML) INTRAVENOUS
Status: DISPENSED
Start: 2024-11-14

## (undated) RX ORDER — ONDANSETRON 2 MG/ML
INJECTION INTRAMUSCULAR; INTRAVENOUS
Status: DISPENSED
Start: 2023-01-07

## (undated) RX ORDER — LIDOCAINE HYDROCHLORIDE 10 MG/ML
INJECTION, SOLUTION EPIDURAL; INFILTRATION; INTRACAUDAL; PERINEURAL
Status: DISPENSED
Start: 2019-08-29

## (undated) RX ORDER — FENTANYL CITRATE 50 UG/ML
INJECTION, SOLUTION INTRAMUSCULAR; INTRAVENOUS
Status: DISPENSED
Start: 2019-12-08

## (undated) RX ORDER — CLOPIDOGREL 300 MG/1
TABLET, FILM COATED ORAL
Status: DISPENSED
Start: 2024-11-14

## (undated) RX ORDER — ONDANSETRON 2 MG/ML
INJECTION INTRAMUSCULAR; INTRAVENOUS
Status: DISPENSED
Start: 2020-02-12

## (undated) RX ORDER — HEPARIN SODIUM 1000 [USP'U]/ML
INJECTION, SOLUTION INTRAVENOUS; SUBCUTANEOUS
Status: DISPENSED
Start: 2024-11-14

## (undated) RX ORDER — GLYCOPYRROLATE 0.2 MG/ML
INJECTION INTRAMUSCULAR; INTRAVENOUS
Status: DISPENSED
Start: 2019-07-22

## (undated) RX ORDER — POTASSIUM CHLORIDE 1500 MG/1
TABLET, EXTENDED RELEASE ORAL
Status: DISPENSED
Start: 2024-05-24

## (undated) RX ORDER — AMINOPHYLLINE 25 MG/ML
INJECTION, SOLUTION INTRAVENOUS
Status: DISPENSED
Start: 2024-11-13

## (undated) RX ORDER — LIDOCAINE HYDROCHLORIDE 10 MG/ML
INJECTION, SOLUTION EPIDURAL; INFILTRATION; INTRACAUDAL; PERINEURAL
Status: DISPENSED
Start: 2020-02-12

## (undated) RX ORDER — HYDROMORPHONE HYDROCHLORIDE 1 MG/ML
INJECTION, SOLUTION INTRAMUSCULAR; INTRAVENOUS; SUBCUTANEOUS
Status: DISPENSED
Start: 2019-07-22

## (undated) RX ORDER — BUPIVACAINE HYDROCHLORIDE 5 MG/ML
INJECTION, SOLUTION EPIDURAL; INTRACAUDAL
Status: DISPENSED
Start: 2019-08-29

## (undated) RX ORDER — ALBUMIN, HUMAN INJ 5% 5 %
SOLUTION INTRAVENOUS
Status: DISPENSED
Start: 2019-11-12

## (undated) RX ORDER — FENTANYL CITRATE 50 UG/ML
INJECTION, SOLUTION INTRAMUSCULAR; INTRAVENOUS
Status: DISPENSED
Start: 2019-04-22

## (undated) RX ORDER — PROPOFOL 10 MG/ML
INJECTION, EMULSION INTRAVENOUS
Status: DISPENSED
Start: 2019-12-08

## (undated) RX ORDER — ONDANSETRON 2 MG/ML
INJECTION INTRAMUSCULAR; INTRAVENOUS
Status: DISPENSED
Start: 2019-07-22

## (undated) RX ORDER — GABAPENTIN 300 MG/1
CAPSULE ORAL
Status: DISPENSED
Start: 2019-08-29

## (undated) RX ORDER — ACETAMINOPHEN 325 MG/1
TABLET ORAL
Status: DISPENSED
Start: 2019-08-29

## (undated) RX ORDER — CEFAZOLIN SODIUM 1 G/3ML
INJECTION, POWDER, FOR SOLUTION INTRAMUSCULAR; INTRAVENOUS
Status: DISPENSED
Start: 2023-01-07

## (undated) RX ORDER — ONDANSETRON 2 MG/ML
INJECTION INTRAMUSCULAR; INTRAVENOUS
Status: DISPENSED
Start: 2019-08-29

## (undated) RX ORDER — NEOSTIGMINE METHYLSULFATE 1 MG/ML
VIAL (ML) INJECTION
Status: DISPENSED
Start: 2019-11-12

## (undated) RX ORDER — PROPOFOL 10 MG/ML
INJECTION, EMULSION INTRAVENOUS
Status: DISPENSED
Start: 2023-01-07

## (undated) RX ORDER — HYDROMORPHONE HYDROCHLORIDE 1 MG/ML
INJECTION, SOLUTION INTRAMUSCULAR; INTRAVENOUS; SUBCUTANEOUS
Status: DISPENSED
Start: 2019-12-08

## (undated) RX ORDER — NEOSTIGMINE METHYLSULFATE 1 MG/ML
VIAL (ML) INJECTION
Status: DISPENSED
Start: 2019-07-22

## (undated) RX ORDER — GLYCOPYRROLATE 0.2 MG/ML
INJECTION INTRAMUSCULAR; INTRAVENOUS
Status: DISPENSED
Start: 2019-08-29

## (undated) RX ORDER — CEFAZOLIN SODIUM 2 G/100ML
INJECTION, SOLUTION INTRAVENOUS
Status: DISPENSED
Start: 2019-11-12

## (undated) RX ORDER — LIDOCAINE HYDROCHLORIDE 10 MG/ML
INJECTION, SOLUTION EPIDURAL; INFILTRATION; INTRACAUDAL; PERINEURAL
Status: DISPENSED
Start: 2019-04-22